# Patient Record
Sex: MALE | Race: WHITE | NOT HISPANIC OR LATINO | Employment: OTHER | URBAN - METROPOLITAN AREA
[De-identification: names, ages, dates, MRNs, and addresses within clinical notes are randomized per-mention and may not be internally consistent; named-entity substitution may affect disease eponyms.]

---

## 2017-07-10 ENCOUNTER — TRANSCRIBE ORDERS (OUTPATIENT)
Dept: URGENT CARE | Facility: CLINIC | Age: 72
End: 2017-07-10

## 2017-07-10 ENCOUNTER — APPOINTMENT (OUTPATIENT)
Dept: LAB | Facility: CLINIC | Age: 72
End: 2017-07-10
Payer: COMMERCIAL

## 2017-07-10 DIAGNOSIS — N42.9 UNSPECIFIED DISORDER OF PROSTATE: ICD-10-CM

## 2017-07-10 DIAGNOSIS — E11.8 TYPE 2 DIABETES MELLITUS WITH COMPLICATION, WITHOUT LONG-TERM CURRENT USE OF INSULIN (HCC): ICD-10-CM

## 2017-07-10 DIAGNOSIS — E78.5 OTHER AND UNSPECIFIED HYPERLIPIDEMIA: ICD-10-CM

## 2017-07-10 DIAGNOSIS — E11.8 TYPE 2 DIABETES MELLITUS WITH COMPLICATION, WITHOUT LONG-TERM CURRENT USE OF INSULIN (HCC): Primary | ICD-10-CM

## 2017-07-10 DIAGNOSIS — R53.83 FATIGUE, UNSPECIFIED TYPE: ICD-10-CM

## 2017-07-10 LAB
ALBUMIN SERPL BCP-MCNC: 3.7 G/DL (ref 3.5–5)
ALP SERPL-CCNC: 77 U/L (ref 46–116)
ALT SERPL W P-5'-P-CCNC: 24 U/L (ref 12–78)
ANION GAP SERPL CALCULATED.3IONS-SCNC: 8 MMOL/L (ref 4–13)
AST SERPL W P-5'-P-CCNC: 16 U/L (ref 5–45)
BILIRUB SERPL-MCNC: 0.65 MG/DL (ref 0.2–1)
BUN SERPL-MCNC: 11 MG/DL (ref 5–25)
CALCIUM SERPL-MCNC: 9 MG/DL (ref 8.3–10.1)
CHLORIDE SERPL-SCNC: 103 MMOL/L (ref 100–108)
CHOLEST SERPL-MCNC: 200 MG/DL (ref 50–200)
CO2 SERPL-SCNC: 26 MMOL/L (ref 21–32)
CREAT SERPL-MCNC: 0.95 MG/DL (ref 0.6–1.3)
ERYTHROCYTE [DISTWIDTH] IN BLOOD BY AUTOMATED COUNT: 14 % (ref 11.6–15.1)
GFR SERPL CREATININE-BSD FRML MDRD: >60 ML/MIN/1.73SQ M
GLUCOSE P FAST SERPL-MCNC: 185 MG/DL (ref 65–99)
HCT VFR BLD AUTO: 41.3 % (ref 36.5–49.3)
HDLC SERPL-MCNC: 41 MG/DL (ref 40–60)
HGB BLD-MCNC: 14.5 G/DL (ref 12–17)
LDLC SERPL CALC-MCNC: 131 MG/DL (ref 0–100)
MCH RBC QN AUTO: 31 PG (ref 26.8–34.3)
MCHC RBC AUTO-ENTMCNC: 35.1 G/DL (ref 31.4–37.4)
MCV RBC AUTO: 88 FL (ref 82–98)
PLATELET # BLD AUTO: 211 THOUSANDS/UL (ref 149–390)
PMV BLD AUTO: 9.9 FL (ref 8.9–12.7)
POTASSIUM SERPL-SCNC: 4.1 MMOL/L (ref 3.5–5.3)
PROT SERPL-MCNC: 7.3 G/DL (ref 6.4–8.2)
PSA SERPL-MCNC: 2.2 NG/ML (ref 0–4)
RBC # BLD AUTO: 4.68 MILLION/UL (ref 3.88–5.62)
SODIUM SERPL-SCNC: 137 MMOL/L (ref 136–145)
T4 FREE SERPL-MCNC: 0.91 NG/DL (ref 0.76–1.46)
TRIGL SERPL-MCNC: 141 MG/DL
TSH SERPL DL<=0.05 MIU/L-ACNC: 1.83 UIU/ML (ref 0.36–3.74)
WBC # BLD AUTO: 6.21 THOUSAND/UL (ref 4.31–10.16)

## 2017-07-10 PROCEDURE — 85027 COMPLETE CBC AUTOMATED: CPT

## 2017-07-10 PROCEDURE — 84443 ASSAY THYROID STIM HORMONE: CPT

## 2017-07-10 PROCEDURE — G0103 PSA SCREENING: HCPCS

## 2017-07-10 PROCEDURE — 80061 LIPID PANEL: CPT

## 2017-07-10 PROCEDURE — 80053 COMPREHEN METABOLIC PANEL: CPT

## 2017-07-10 PROCEDURE — 36415 COLL VENOUS BLD VENIPUNCTURE: CPT

## 2017-07-10 PROCEDURE — 84439 ASSAY OF FREE THYROXINE: CPT

## 2017-12-12 ENCOUNTER — APPOINTMENT (OUTPATIENT)
Dept: LAB | Facility: CLINIC | Age: 72
End: 2017-12-12
Payer: COMMERCIAL

## 2017-12-12 ENCOUNTER — TRANSCRIBE ORDERS (OUTPATIENT)
Dept: URGENT CARE | Facility: CLINIC | Age: 72
End: 2017-12-12

## 2017-12-12 DIAGNOSIS — E11.8 TYPE 2 DIABETES MELLITUS WITH COMPLICATION, UNSPECIFIED LONG TERM INSULIN USE STATUS: ICD-10-CM

## 2017-12-12 DIAGNOSIS — E11.8 TYPE 2 DIABETES MELLITUS WITH COMPLICATION, UNSPECIFIED LONG TERM INSULIN USE STATUS: Primary | ICD-10-CM

## 2017-12-12 DIAGNOSIS — B19.20 HEPATITIS C VIRUS INFECTION WITHOUT HEPATIC COMA, UNSPECIFIED CHRONICITY: ICD-10-CM

## 2017-12-12 DIAGNOSIS — I10 BENIGN HYPERTENSION: ICD-10-CM

## 2017-12-12 DIAGNOSIS — E55.9 VITAMIN D DEFICIENCY DISEASE: ICD-10-CM

## 2017-12-12 DIAGNOSIS — E78.5 HYPERLIPIDEMIA, UNSPECIFIED HYPERLIPIDEMIA TYPE: ICD-10-CM

## 2017-12-12 LAB
25(OH)D3 SERPL-MCNC: 14.2 NG/ML (ref 30–100)
ALBUMIN SERPL BCP-MCNC: 3.4 G/DL (ref 3.5–5)
ALP SERPL-CCNC: 84 U/L (ref 46–116)
ALT SERPL W P-5'-P-CCNC: 20 U/L (ref 12–78)
ANION GAP SERPL CALCULATED.3IONS-SCNC: 5 MMOL/L (ref 4–13)
AST SERPL W P-5'-P-CCNC: 8 U/L (ref 5–45)
BASOPHILS # BLD AUTO: 0.03 THOUSANDS/ΜL (ref 0–0.1)
BASOPHILS NFR BLD AUTO: 0 % (ref 0–1)
BILIRUB SERPL-MCNC: 0.39 MG/DL (ref 0.2–1)
BUN SERPL-MCNC: 15 MG/DL (ref 5–25)
CALCIUM SERPL-MCNC: 8.8 MG/DL (ref 8.3–10.1)
CHLORIDE SERPL-SCNC: 104 MMOL/L (ref 100–108)
CHOLEST SERPL-MCNC: 201 MG/DL (ref 50–200)
CO2 SERPL-SCNC: 28 MMOL/L (ref 21–32)
CREAT SERPL-MCNC: 0.98 MG/DL (ref 0.6–1.3)
CREAT UR-MCNC: 189 MG/DL
EOSINOPHIL # BLD AUTO: 0.31 THOUSAND/ΜL (ref 0–0.61)
EOSINOPHIL NFR BLD AUTO: 4 % (ref 0–6)
ERYTHROCYTE [DISTWIDTH] IN BLOOD BY AUTOMATED COUNT: 13.3 % (ref 11.6–15.1)
EST. AVERAGE GLUCOSE BLD GHB EST-MCNC: 177 MG/DL
GFR SERPL CREATININE-BSD FRML MDRD: 77 ML/MIN/1.73SQ M
GLUCOSE P FAST SERPL-MCNC: 181 MG/DL (ref 65–99)
HBA1C MFR BLD: 7.8 % (ref 4.2–6.3)
HCT VFR BLD AUTO: 42.4 % (ref 36.5–49.3)
HDLC SERPL-MCNC: 43 MG/DL (ref 40–60)
HGB BLD-MCNC: 14.8 G/DL (ref 12–17)
LDLC SERPL CALC-MCNC: 135 MG/DL (ref 0–100)
LYMPHOCYTES # BLD AUTO: 2.12 THOUSANDS/ΜL (ref 0.6–4.47)
LYMPHOCYTES NFR BLD AUTO: 29 % (ref 14–44)
MCH RBC QN AUTO: 31 PG (ref 26.8–34.3)
MCHC RBC AUTO-ENTMCNC: 34.9 G/DL (ref 31.4–37.4)
MCV RBC AUTO: 89 FL (ref 82–98)
MICROALBUMIN UR-MCNC: 50.4 MG/L (ref 0–20)
MICROALBUMIN/CREAT 24H UR: 27 MG/G CREATININE (ref 0–30)
MONOCYTES # BLD AUTO: 0.72 THOUSAND/ΜL (ref 0.17–1.22)
MONOCYTES NFR BLD AUTO: 10 % (ref 4–12)
NEUTROPHILS # BLD AUTO: 4.09 THOUSANDS/ΜL (ref 1.85–7.62)
NEUTS SEG NFR BLD AUTO: 57 % (ref 43–75)
NRBC BLD AUTO-RTO: 0 /100 WBCS
PLATELET # BLD AUTO: 216 THOUSANDS/UL (ref 149–390)
PMV BLD AUTO: 9.7 FL (ref 8.9–12.7)
POTASSIUM SERPL-SCNC: 4.2 MMOL/L (ref 3.5–5.3)
PROT SERPL-MCNC: 7.1 G/DL (ref 6.4–8.2)
RBC # BLD AUTO: 4.77 MILLION/UL (ref 3.88–5.62)
SODIUM SERPL-SCNC: 137 MMOL/L (ref 136–145)
TRIGL SERPL-MCNC: 115 MG/DL
WBC # BLD AUTO: 7.3 THOUSAND/UL (ref 4.31–10.16)

## 2017-12-12 PROCEDURE — 80053 COMPREHEN METABOLIC PANEL: CPT

## 2017-12-12 PROCEDURE — 36415 COLL VENOUS BLD VENIPUNCTURE: CPT

## 2017-12-12 PROCEDURE — 82570 ASSAY OF URINE CREATININE: CPT

## 2017-12-12 PROCEDURE — 82043 UR ALBUMIN QUANTITATIVE: CPT

## 2017-12-12 PROCEDURE — 80061 LIPID PANEL: CPT

## 2017-12-12 PROCEDURE — 83036 HEMOGLOBIN GLYCOSYLATED A1C: CPT

## 2017-12-12 PROCEDURE — 82306 VITAMIN D 25 HYDROXY: CPT

## 2017-12-12 PROCEDURE — 86803 HEPATITIS C AB TEST: CPT

## 2017-12-12 PROCEDURE — 85025 COMPLETE CBC W/AUTO DIFF WBC: CPT

## 2017-12-13 LAB — HCV AB SER QL: NORMAL

## 2018-08-08 ENCOUNTER — TRANSCRIBE ORDERS (OUTPATIENT)
Dept: URGENT CARE | Facility: CLINIC | Age: 73
End: 2018-08-08

## 2018-08-08 ENCOUNTER — APPOINTMENT (OUTPATIENT)
Dept: LAB | Facility: CLINIC | Age: 73
End: 2018-08-08
Payer: COMMERCIAL

## 2018-08-08 DIAGNOSIS — Z79.899 ENCOUNTER FOR LONG-TERM (CURRENT) USE OF MEDICATIONS: ICD-10-CM

## 2018-08-08 DIAGNOSIS — E11.8 TYPE 2 DIABETES MELLITUS WITH COMPLICATION, UNSPECIFIED WHETHER LONG TERM INSULIN USE: ICD-10-CM

## 2018-08-08 DIAGNOSIS — I10 ESSENTIAL HYPERTENSION, MALIGNANT: ICD-10-CM

## 2018-08-08 DIAGNOSIS — E78.5 HYPERLIPIDEMIA, UNSPECIFIED HYPERLIPIDEMIA TYPE: ICD-10-CM

## 2018-08-08 DIAGNOSIS — E11.8 TYPE 2 DIABETES MELLITUS WITH COMPLICATION, UNSPECIFIED WHETHER LONG TERM INSULIN USE: Primary | ICD-10-CM

## 2018-08-08 LAB
ALBUMIN SERPL BCP-MCNC: 3.8 G/DL (ref 3.5–5)
ALP SERPL-CCNC: 68 U/L (ref 46–116)
ALT SERPL W P-5'-P-CCNC: 24 U/L (ref 12–78)
ANION GAP SERPL CALCULATED.3IONS-SCNC: 5 MMOL/L (ref 4–13)
AST SERPL W P-5'-P-CCNC: 9 U/L (ref 5–45)
BILIRUB SERPL-MCNC: 0.59 MG/DL (ref 0.2–1)
BUN SERPL-MCNC: 12 MG/DL (ref 5–25)
CALCIUM SERPL-MCNC: 9 MG/DL (ref 8.3–10.1)
CHLORIDE SERPL-SCNC: 101 MMOL/L (ref 100–108)
CHOLEST SERPL-MCNC: 211 MG/DL (ref 50–200)
CO2 SERPL-SCNC: 29 MMOL/L (ref 21–32)
CREAT SERPL-MCNC: 0.94 MG/DL (ref 0.6–1.3)
CREAT UR-MCNC: 75.5 MG/DL
ERYTHROCYTE [DISTWIDTH] IN BLOOD BY AUTOMATED COUNT: 12.8 % (ref 11.6–15.1)
EST. AVERAGE GLUCOSE BLD GHB EST-MCNC: 194 MG/DL
GFR SERPL CREATININE-BSD FRML MDRD: 81 ML/MIN/1.73SQ M
GLUCOSE P FAST SERPL-MCNC: 209 MG/DL (ref 65–99)
HBA1C MFR BLD: 8.4 % (ref 4.2–6.3)
HCT VFR BLD AUTO: 43.8 % (ref 36.5–49.3)
HDLC SERPL-MCNC: 43 MG/DL (ref 40–60)
HGB BLD-MCNC: 14.5 G/DL (ref 12–17)
LDLC SERPL CALC-MCNC: 134 MG/DL (ref 0–100)
MCH RBC QN AUTO: 30.7 PG (ref 26.8–34.3)
MCHC RBC AUTO-ENTMCNC: 33.1 G/DL (ref 31.4–37.4)
MCV RBC AUTO: 93 FL (ref 82–98)
MICROALBUMIN UR-MCNC: 29.6 MG/L (ref 0–20)
MICROALBUMIN/CREAT 24H UR: 39 MG/G CREATININE (ref 0–30)
NONHDLC SERPL-MCNC: 168 MG/DL
PLATELET # BLD AUTO: 232 THOUSANDS/UL (ref 149–390)
PMV BLD AUTO: 10 FL (ref 8.9–12.7)
POTASSIUM SERPL-SCNC: 4.1 MMOL/L (ref 3.5–5.3)
PROT SERPL-MCNC: 7.1 G/DL (ref 6.4–8.2)
RBC # BLD AUTO: 4.72 MILLION/UL (ref 3.88–5.62)
SODIUM SERPL-SCNC: 135 MMOL/L (ref 136–145)
TRIGL SERPL-MCNC: 169 MG/DL
WBC # BLD AUTO: 6.52 THOUSAND/UL (ref 4.31–10.16)

## 2018-08-08 PROCEDURE — 82570 ASSAY OF URINE CREATININE: CPT

## 2018-08-08 PROCEDURE — 82043 UR ALBUMIN QUANTITATIVE: CPT

## 2018-08-08 PROCEDURE — 80053 COMPREHEN METABOLIC PANEL: CPT

## 2018-08-08 PROCEDURE — 83036 HEMOGLOBIN GLYCOSYLATED A1C: CPT

## 2018-08-08 PROCEDURE — 80061 LIPID PANEL: CPT

## 2018-08-08 PROCEDURE — 36415 COLL VENOUS BLD VENIPUNCTURE: CPT

## 2018-08-08 PROCEDURE — 85027 COMPLETE CBC AUTOMATED: CPT

## 2018-12-28 ENCOUNTER — APPOINTMENT (OUTPATIENT)
Dept: RADIOLOGY | Facility: CLINIC | Age: 73
End: 2018-12-28
Payer: COMMERCIAL

## 2018-12-28 ENCOUNTER — TRANSCRIBE ORDERS (OUTPATIENT)
Dept: URGENT CARE | Facility: CLINIC | Age: 73
End: 2018-12-28

## 2018-12-28 DIAGNOSIS — R05.9 COUGH: ICD-10-CM

## 2018-12-28 DIAGNOSIS — R05.9 COUGH: Primary | ICD-10-CM

## 2018-12-28 PROCEDURE — 71046 X-RAY EXAM CHEST 2 VIEWS: CPT

## 2019-03-22 ENCOUNTER — APPOINTMENT (EMERGENCY)
Dept: RADIOLOGY | Facility: HOSPITAL | Age: 74
End: 2019-03-22
Payer: COMMERCIAL

## 2019-03-22 ENCOUNTER — HOSPITAL ENCOUNTER (OUTPATIENT)
Facility: HOSPITAL | Age: 74
Setting detail: OBSERVATION
Discharge: HOME | End: 2019-03-23
Attending: EMERGENCY MEDICINE | Admitting: INTERNAL MEDICINE
Payer: COMMERCIAL

## 2019-03-22 DIAGNOSIS — R07.9 CHEST PAIN: Primary | ICD-10-CM

## 2019-03-22 DIAGNOSIS — I10 HYPERTENSION: ICD-10-CM

## 2019-03-22 PROBLEM — E11.8 TYPE 2 DIABETES MELLITUS WITH COMPLICATION, WITHOUT LONG-TERM CURRENT USE OF INSULIN (HCC): Chronic | Status: ACTIVE | Noted: 2017-10-23

## 2019-03-22 PROBLEM — E11.9 DIABETES MELLITUS (HCC): Status: ACTIVE | Noted: 2017-10-23

## 2019-03-22 PROBLEM — E11.8 TYPE 2 DIABETES MELLITUS WITH COMPLICATION, WITHOUT LONG-TERM CURRENT USE OF INSULIN (HCC): Status: ACTIVE | Noted: 2017-10-23

## 2019-03-22 PROBLEM — E78.5 HYPERLIPIDEMIA: Chronic | Status: ACTIVE | Noted: 2017-10-23

## 2019-03-22 PROBLEM — J45.909 ASTHMA: Chronic | Status: ACTIVE | Noted: 2017-10-23

## 2019-03-22 PROBLEM — J45.909 ASTHMA: Status: ACTIVE | Noted: 2017-10-23

## 2019-03-22 PROBLEM — E78.5 HYPERLIPIDEMIA: Status: ACTIVE | Noted: 2017-10-23

## 2019-03-22 PROBLEM — E11.9 DIABETES MELLITUS (HCC): Chronic | Status: ACTIVE | Noted: 2017-10-23

## 2019-03-22 LAB
ALBUMIN SERPL BCP-MCNC: 4.2 G/DL (ref 3.5–5.7)
ALP SERPL-CCNC: 60 U/L (ref 55–165)
ALT SERPL W P-5'-P-CCNC: 15 U/L (ref 7–52)
ANION GAP SERPL CALCULATED.3IONS-SCNC: 6 MMOL/L (ref 4–13)
APTT PPP: 34 SECONDS (ref 26–38)
AST SERPL W P-5'-P-CCNC: 15 U/L (ref 13–39)
BASOPHILS # BLD AUTO: 0 THOUSANDS/ΜL (ref 0–0.1)
BASOPHILS NFR BLD AUTO: 1 % (ref 0–2)
BILIRUB SERPL-MCNC: 0.3 MG/DL (ref 0.2–1)
BNP SERPL-MCNC: 42 PG/ML (ref 1–100)
BUN SERPL-MCNC: 15 MG/DL (ref 7–25)
CALCIUM SERPL-MCNC: 9.8 MG/DL (ref 8.6–10.5)
CHLORIDE SERPL-SCNC: 102 MMOL/L (ref 98–107)
CO2 SERPL-SCNC: 27 MMOL/L (ref 21–31)
CREAT SERPL-MCNC: 0.94 MG/DL (ref 0.7–1.3)
EOSINOPHIL # BLD AUTO: 0.2 THOUSAND/ΜL (ref 0–0.61)
EOSINOPHIL NFR BLD AUTO: 2 % (ref 0–5)
ERYTHROCYTE [DISTWIDTH] IN BLOOD BY AUTOMATED COUNT: 14.2 % (ref 11.5–14.5)
GFR SERPL CREATININE-BSD FRML MDRD: 80 ML/MIN/1.73SQ M
GLUCOSE SERPL-MCNC: 131 MG/DL (ref 65–140)
GLUCOSE SERPL-MCNC: 136 MG/DL (ref 65–99)
GLUCOSE SERPL-MCNC: 192 MG/DL (ref 65–140)
HCT VFR BLD AUTO: 42.2 % (ref 42–47)
HGB BLD-MCNC: 14.6 G/DL (ref 14–18)
INR PPP: 0.97 (ref 0.9–1.5)
LYMPHOCYTES # BLD AUTO: 1.5 THOUSANDS/ΜL (ref 0.6–4.47)
LYMPHOCYTES NFR BLD AUTO: 20 % (ref 21–51)
MCH RBC QN AUTO: 31.6 PG (ref 26–34)
MCHC RBC AUTO-ENTMCNC: 34.5 G/DL (ref 31–37)
MCV RBC AUTO: 92 FL (ref 81–99)
MONOCYTES # BLD AUTO: 0.6 THOUSAND/ΜL (ref 0.17–1.22)
MONOCYTES NFR BLD AUTO: 8 % (ref 2–12)
NEUTROPHILS # BLD AUTO: 5.3 THOUSANDS/ΜL (ref 1.4–6.5)
NEUTS SEG NFR BLD AUTO: 70 % (ref 42–75)
PLATELET # BLD AUTO: 240 THOUSANDS/UL (ref 149–390)
PMV BLD AUTO: 6.9 FL (ref 8.6–11.7)
POTASSIUM SERPL-SCNC: 4.2 MMOL/L (ref 3.5–5.5)
PROT SERPL-MCNC: 6.9 G/DL (ref 6.4–8.9)
PROTHROMBIN TIME: 11.3 SECONDS (ref 10.2–13)
RBC # BLD AUTO: 4.6 MILLION/UL (ref 4.3–5.9)
SODIUM SERPL-SCNC: 135 MMOL/L (ref 134–143)
TROPONIN I SERPL-MCNC: <0.03 NG/ML
TROPONIN I SERPL-MCNC: <0.03 NG/ML
WBC # BLD AUTO: 7.7 THOUSAND/UL (ref 4.8–10.8)

## 2019-03-22 PROCEDURE — 71045 X-RAY EXAM CHEST 1 VIEW: CPT

## 2019-03-22 PROCEDURE — 82948 REAGENT STRIP/BLOOD GLUCOSE: CPT

## 2019-03-22 PROCEDURE — 85025 COMPLETE CBC W/AUTO DIFF WBC: CPT | Performed by: EMERGENCY MEDICINE

## 2019-03-22 PROCEDURE — 84484 ASSAY OF TROPONIN QUANT: CPT | Performed by: EMERGENCY MEDICINE

## 2019-03-22 PROCEDURE — 36415 COLL VENOUS BLD VENIPUNCTURE: CPT | Performed by: EMERGENCY MEDICINE

## 2019-03-22 PROCEDURE — 84484 ASSAY OF TROPONIN QUANT: CPT | Performed by: PHYSICIAN ASSISTANT

## 2019-03-22 PROCEDURE — 93005 ELECTROCARDIOGRAM TRACING: CPT

## 2019-03-22 PROCEDURE — 99285 EMERGENCY DEPT VISIT HI MDM: CPT

## 2019-03-22 PROCEDURE — 85610 PROTHROMBIN TIME: CPT | Performed by: EMERGENCY MEDICINE

## 2019-03-22 PROCEDURE — 94760 N-INVAS EAR/PLS OXIMETRY 1: CPT

## 2019-03-22 PROCEDURE — 83880 ASSAY OF NATRIURETIC PEPTIDE: CPT | Performed by: EMERGENCY MEDICINE

## 2019-03-22 PROCEDURE — 83036 HEMOGLOBIN GLYCOSYLATED A1C: CPT | Performed by: PHYSICIAN ASSISTANT

## 2019-03-22 PROCEDURE — 99220 PR INITIAL OBSERVATION CARE/DAY 70 MINUTES: CPT | Performed by: PHYSICIAN ASSISTANT

## 2019-03-22 PROCEDURE — 80053 COMPREHEN METABOLIC PANEL: CPT | Performed by: EMERGENCY MEDICINE

## 2019-03-22 PROCEDURE — 85730 THROMBOPLASTIN TIME PARTIAL: CPT | Performed by: EMERGENCY MEDICINE

## 2019-03-22 RX ORDER — ACETAMINOPHEN 325 MG/1
650 TABLET ORAL EVERY 4 HOURS PRN
Status: DISCONTINUED | OUTPATIENT
Start: 2019-03-22 | End: 2019-03-23 | Stop reason: HOSPADM

## 2019-03-22 RX ORDER — ATENOLOL 25 MG/1
25 TABLET ORAL DAILY
Status: DISCONTINUED | OUTPATIENT
Start: 2019-03-23 | End: 2019-03-23 | Stop reason: HOSPADM

## 2019-03-22 RX ORDER — LORATADINE 10 MG/1
10 TABLET ORAL DAILY
Status: DISCONTINUED | OUTPATIENT
Start: 2019-03-23 | End: 2019-03-23 | Stop reason: HOSPADM

## 2019-03-22 RX ORDER — LISINOPRIL 20 MG/1
20 TABLET ORAL 2 TIMES DAILY
Status: DISCONTINUED | OUTPATIENT
Start: 2019-03-22 | End: 2019-03-23 | Stop reason: HOSPADM

## 2019-03-22 RX ORDER — ALBUTEROL SULFATE 90 UG/1
AEROSOL, METERED RESPIRATORY (INHALATION)
COMMUNITY

## 2019-03-22 RX ORDER — GLIPIZIDE 2.5 MG/1
2.5 TABLET, EXTENDED RELEASE ORAL 2 TIMES DAILY
COMMUNITY

## 2019-03-22 RX ORDER — AMLODIPINE BESYLATE 5 MG/1
5 TABLET ORAL
Status: DISCONTINUED | OUTPATIENT
Start: 2019-03-22 | End: 2019-03-23 | Stop reason: HOSPADM

## 2019-03-22 RX ORDER — LISINOPRIL 20 MG/1
20 TABLET ORAL 2 TIMES DAILY
COMMUNITY

## 2019-03-22 RX ORDER — ALBUTEROL SULFATE 90 UG/1
2 AEROSOL, METERED RESPIRATORY (INHALATION) EVERY 4 HOURS PRN
Status: DISCONTINUED | OUTPATIENT
Start: 2019-03-22 | End: 2019-03-23 | Stop reason: HOSPADM

## 2019-03-22 RX ORDER — FAMOTIDINE 20 MG/1
20 TABLET, FILM COATED ORAL 2 TIMES DAILY
Status: DISCONTINUED | OUTPATIENT
Start: 2019-03-22 | End: 2019-03-23 | Stop reason: HOSPADM

## 2019-03-22 RX ORDER — RANITIDINE 300 MG/1
TABLET ORAL
COMMUNITY
End: 2021-03-26 | Stop reason: HOSPADM

## 2019-03-22 RX ORDER — ASPIRIN 81 MG/1
81 TABLET, CHEWABLE ORAL DAILY
Status: DISCONTINUED | OUTPATIENT
Start: 2019-03-23 | End: 2019-03-23 | Stop reason: HOSPADM

## 2019-03-22 RX ORDER — ASPIRIN 325 MG
325 TABLET ORAL ONCE
Status: COMPLETED | OUTPATIENT
Start: 2019-03-22 | End: 2019-03-22

## 2019-03-22 RX ORDER — ATENOLOL 25 MG/1
25 TABLET ORAL DAILY
COMMUNITY

## 2019-03-22 RX ORDER — AMLODIPINE BESYLATE 5 MG/1
5 TABLET ORAL 2 TIMES DAILY
COMMUNITY

## 2019-03-22 RX ORDER — MONTELUKAST SODIUM 10 MG/1
10 TABLET ORAL
Status: DISCONTINUED | OUTPATIENT
Start: 2019-03-22 | End: 2019-03-23 | Stop reason: HOSPADM

## 2019-03-22 RX ORDER — NITROGLYCERIN 0.4 MG/1
0.4 TABLET SUBLINGUAL ONCE
Status: COMPLETED | OUTPATIENT
Start: 2019-03-22 | End: 2019-03-22

## 2019-03-22 RX ORDER — HYDROXYZINE HYDROCHLORIDE 25 MG/1
25 TABLET, FILM COATED ORAL EVERY 12 HOURS
Status: DISCONTINUED | OUTPATIENT
Start: 2019-03-22 | End: 2019-03-23 | Stop reason: HOSPADM

## 2019-03-22 RX ORDER — PREDNISONE 10 MG/1
5 TABLET ORAL DAILY
COMMUNITY
End: 2019-03-23 | Stop reason: HOSPADM

## 2019-03-22 RX ORDER — ONDANSETRON 2 MG/ML
4 INJECTION INTRAMUSCULAR; INTRAVENOUS EVERY 6 HOURS PRN
Status: DISCONTINUED | OUTPATIENT
Start: 2019-03-22 | End: 2019-03-23 | Stop reason: HOSPADM

## 2019-03-22 RX ORDER — NITROGLYCERIN 0.4 MG/1
0.4 TABLET SUBLINGUAL
Status: DISCONTINUED | OUTPATIENT
Start: 2019-03-22 | End: 2019-03-23 | Stop reason: SDUPTHER

## 2019-03-22 RX ORDER — MONTELUKAST SODIUM 10 MG/1
10 TABLET ORAL DAILY
COMMUNITY

## 2019-03-22 RX ORDER — HYDROXYZINE HYDROCHLORIDE 25 MG/1
TABLET, FILM COATED ORAL
COMMUNITY
End: 2022-06-22

## 2019-03-22 RX ADMIN — ASPIRIN 325 MG: 325 TABLET, FILM COATED ORAL at 17:41

## 2019-03-22 RX ADMIN — HYDROXYZINE HYDROCHLORIDE 25 MG: 25 TABLET ORAL at 20:35

## 2019-03-22 RX ADMIN — NITROGLYCERIN 1 INCH: 20 OINTMENT TOPICAL at 17:43

## 2019-03-22 RX ADMIN — FAMOTIDINE 20 MG: 20 TABLET ORAL at 22:06

## 2019-03-22 RX ADMIN — NITROGLYCERIN 0.4 MG: 0.4 TABLET SUBLINGUAL at 17:42

## 2019-03-22 RX ADMIN — LISINOPRIL 20 MG: 20 TABLET ORAL at 20:35

## 2019-03-22 RX ADMIN — INSULIN LISPRO 1 UNITS: 100 INJECTION, SOLUTION INTRAVENOUS; SUBCUTANEOUS at 22:06

## 2019-03-22 RX ADMIN — MONTELUKAST SODIUM 10 MG: 10 TABLET, FILM COATED ORAL at 22:05

## 2019-03-22 RX ADMIN — AMLODIPINE BESYLATE 5 MG: 5 TABLET ORAL at 20:35

## 2019-03-22 RX ADMIN — INSULIN DETEMIR 10 UNITS: 100 INJECTION, SOLUTION SUBCUTANEOUS at 22:07

## 2019-03-23 VITALS
WEIGHT: 190 LBS | HEIGHT: 72 IN | RESPIRATION RATE: 18 BRPM | OXYGEN SATURATION: 97 % | HEART RATE: 68 BPM | TEMPERATURE: 98.5 F | SYSTOLIC BLOOD PRESSURE: 111 MMHG | DIASTOLIC BLOOD PRESSURE: 75 MMHG | BODY MASS INDEX: 25.73 KG/M2

## 2019-03-23 DIAGNOSIS — R07.9 CHEST PAIN: Primary | ICD-10-CM

## 2019-03-23 LAB
ANION GAP SERPL CALCULATED.3IONS-SCNC: 7 MMOL/L (ref 4–13)
ATRIAL RATE: 92 BPM
BUN SERPL-MCNC: 14 MG/DL (ref 7–25)
CALCIUM SERPL-MCNC: 9.3 MG/DL (ref 8.6–10.5)
CHLORIDE SERPL-SCNC: 104 MMOL/L (ref 98–107)
CHOLEST SERPL-MCNC: 165 MG/DL (ref 0–200)
CO2 SERPL-SCNC: 26 MMOL/L (ref 21–31)
CREAT SERPL-MCNC: 0.89 MG/DL (ref 0.7–1.3)
EST. AVERAGE GLUCOSE BLD GHB EST-MCNC: 148 MG/DL
GFR SERPL CREATININE-BSD FRML MDRD: 85 ML/MIN/1.73SQ M
GLUCOSE SERPL-MCNC: 150 MG/DL (ref 65–140)
GLUCOSE SERPL-MCNC: 83 MG/DL (ref 65–140)
GLUCOSE SERPL-MCNC: 93 MG/DL (ref 65–99)
HBA1C MFR BLD: 6.8 % (ref 4.2–6.3)
HDLC SERPL-MCNC: 41 MG/DL (ref 40–60)
LDLC SERPL CALC-MCNC: 107 MG/DL (ref 75–193)
MAGNESIUM SERPL-MCNC: 2.2 MG/DL (ref 1.9–2.7)
NONHDLC SERPL-MCNC: 124 MG/DL
P AXIS: 65 DEGREES
POTASSIUM SERPL-SCNC: 3.7 MMOL/L (ref 3.5–5.5)
PR INTERVAL: 144 MS
QRS AXIS: 69 DEGREES
QRSD INTERVAL: 94 MS
QT INTERVAL: 378 MS
QTC INTERVAL: 467 MS
SODIUM SERPL-SCNC: 137 MMOL/L (ref 134–143)
T WAVE AXIS: 57 DEGREES
TRIGL SERPL-MCNC: 87 MG/DL (ref 44–166)
TROPONIN I SERPL-MCNC: <0.03 NG/ML
VENTRICULAR RATE: 92 BPM

## 2019-03-23 PROCEDURE — 83735 ASSAY OF MAGNESIUM: CPT | Performed by: PHYSICIAN ASSISTANT

## 2019-03-23 PROCEDURE — 80061 LIPID PANEL: CPT | Performed by: PHYSICIAN ASSISTANT

## 2019-03-23 PROCEDURE — 99217 PR OBSERVATION CARE DISCHARGE MANAGEMENT: CPT | Performed by: NURSE PRACTITIONER

## 2019-03-23 PROCEDURE — 93010 ELECTROCARDIOGRAM REPORT: CPT | Performed by: INTERNAL MEDICINE

## 2019-03-23 PROCEDURE — 99204 OFFICE O/P NEW MOD 45 MIN: CPT | Performed by: INTERNAL MEDICINE

## 2019-03-23 PROCEDURE — 80048 BASIC METABOLIC PNL TOTAL CA: CPT | Performed by: PHYSICIAN ASSISTANT

## 2019-03-23 PROCEDURE — 82948 REAGENT STRIP/BLOOD GLUCOSE: CPT

## 2019-03-23 RX ORDER — ASPIRIN 81 MG/1
81 TABLET, CHEWABLE ORAL DAILY
Qty: 30 TABLET | Refills: 0 | Status: SHIPPED | OUTPATIENT
Start: 2019-03-24 | End: 2022-06-22

## 2019-03-23 RX ORDER — NITROGLYCERIN 0.4 MG/1
0.4 TABLET SUBLINGUAL
Status: DISCONTINUED | OUTPATIENT
Start: 2019-03-23 | End: 2019-03-23 | Stop reason: HOSPADM

## 2019-03-23 RX ORDER — NITROGLYCERIN 0.4 MG/1
0.4 TABLET SUBLINGUAL
Qty: 30 TABLET | Refills: 0 | Status: SHIPPED | OUTPATIENT
Start: 2019-03-23 | End: 2022-06-22 | Stop reason: ALTCHOICE

## 2019-03-23 RX ADMIN — ASPIRIN 81 MG 81 MG: 81 TABLET ORAL at 09:57

## 2019-03-23 RX ADMIN — LISINOPRIL 20 MG: 20 TABLET ORAL at 09:56

## 2019-03-23 RX ADMIN — HYDROXYZINE HYDROCHLORIDE 25 MG: 25 TABLET ORAL at 09:57

## 2019-03-23 RX ADMIN — ENOXAPARIN SODIUM 40 MG: 40 INJECTION SUBCUTANEOUS at 09:56

## 2019-03-23 RX ADMIN — ATENOLOL 25 MG: 25 TABLET ORAL at 09:56

## 2019-03-23 RX ADMIN — LORATADINE 10 MG: 10 TABLET ORAL at 09:57

## 2019-03-26 ENCOUNTER — HOSPITAL ENCOUNTER (OUTPATIENT)
Dept: NON INVASIVE DIAGNOSTICS | Facility: CLINIC | Age: 74
Discharge: HOME | End: 2019-03-26
Payer: COMMERCIAL

## 2019-03-26 DIAGNOSIS — R07.9 CHEST PAIN: ICD-10-CM

## 2019-03-26 PROCEDURE — 93016 CV STRESS TEST SUPVJ ONLY: CPT | Performed by: INTERNAL MEDICINE

## 2019-03-26 PROCEDURE — A9502 TC99M TETROFOSMIN: HCPCS

## 2019-03-26 PROCEDURE — 78452 HT MUSCLE IMAGE SPECT MULT: CPT | Performed by: INTERNAL MEDICINE

## 2019-03-26 PROCEDURE — 93017 CV STRESS TEST TRACING ONLY: CPT

## 2019-03-26 PROCEDURE — 93018 CV STRESS TEST I&R ONLY: CPT | Performed by: INTERNAL MEDICINE

## 2019-03-26 PROCEDURE — 78452 HT MUSCLE IMAGE SPECT MULT: CPT

## 2019-03-27 ENCOUNTER — TELEPHONE (OUTPATIENT)
Dept: CARDIOLOGY CLINIC | Facility: CLINIC | Age: 74
End: 2019-03-27

## 2019-03-27 NOTE — TELEPHONE ENCOUNTER
----- Message from Jody Moya MD sent at 3/27/2019 12:09 PM EDT -----  Please let him know that there is no evidence of a new blockage on his recent stress test  Will discuss further at his next appt       Thanks

## 2019-03-29 LAB
ARRHY DURING EX: NORMAL
CHEST PAIN STATEMENT: NORMAL
MAX DIASTOLIC BP: 88 MMHG
MAX HEART RATE: 141 BPM
MAX PREDICTED HEART RATE: 147 BPM
MAX. SYSTOLIC BP: 182 MMHG
PROTOCOL NAME: NORMAL
REASON FOR TERMINATION: NORMAL
TARGET HR FORMULA: NORMAL
TEST INDICATION: NORMAL
TIME IN EXERCISE PHASE: NORMAL

## 2019-07-29 ENCOUNTER — APPOINTMENT (OUTPATIENT)
Dept: LAB | Facility: CLINIC | Age: 74
End: 2019-07-29
Payer: COMMERCIAL

## 2019-07-29 ENCOUNTER — TRANSCRIBE ORDERS (OUTPATIENT)
Dept: URGENT CARE | Facility: CLINIC | Age: 74
End: 2019-07-29

## 2019-07-29 DIAGNOSIS — E78.5 HYPERLIPIDEMIA, UNSPECIFIED HYPERLIPIDEMIA TYPE: ICD-10-CM

## 2019-07-29 DIAGNOSIS — I10 ESSENTIAL HYPERTENSION: ICD-10-CM

## 2019-07-29 DIAGNOSIS — R63.5 WEIGHT GAIN: ICD-10-CM

## 2019-07-29 DIAGNOSIS — Z79.4 TYPE 2 DIABETES MELLITUS WITH COMPLICATION, WITH LONG-TERM CURRENT USE OF INSULIN (HCC): ICD-10-CM

## 2019-07-29 DIAGNOSIS — Z79.899 ENCOUNTER FOR LONG-TERM (CURRENT) USE OF OTHER MEDICATIONS: Primary | ICD-10-CM

## 2019-07-29 DIAGNOSIS — E11.8 TYPE 2 DIABETES MELLITUS WITH COMPLICATION, WITH LONG-TERM CURRENT USE OF INSULIN (HCC): ICD-10-CM

## 2019-07-29 LAB
ALBUMIN SERPL BCP-MCNC: 3.6 G/DL (ref 3.5–5)
ALP SERPL-CCNC: 78 U/L (ref 46–116)
ALT SERPL W P-5'-P-CCNC: 24 U/L (ref 12–78)
ANION GAP SERPL CALCULATED.3IONS-SCNC: 6 MMOL/L (ref 4–13)
AST SERPL W P-5'-P-CCNC: 15 U/L (ref 5–45)
BILIRUB SERPL-MCNC: 0.32 MG/DL (ref 0.2–1)
BUN SERPL-MCNC: 15 MG/DL (ref 5–25)
CALCIUM SERPL-MCNC: 8.8 MG/DL (ref 8.3–10.1)
CHLORIDE SERPL-SCNC: 106 MMOL/L (ref 100–108)
CHOLEST SERPL-MCNC: 186 MG/DL (ref 50–200)
CO2 SERPL-SCNC: 28 MMOL/L (ref 21–32)
CREAT SERPL-MCNC: 0.97 MG/DL (ref 0.6–1.3)
CREAT UR-MCNC: 96.5 MG/DL
ERYTHROCYTE [DISTWIDTH] IN BLOOD BY AUTOMATED COUNT: 12.9 % (ref 11.6–15.1)
EST. AVERAGE GLUCOSE BLD GHB EST-MCNC: 140 MG/DL
GFR SERPL CREATININE-BSD FRML MDRD: 77 ML/MIN/1.73SQ M
GLUCOSE P FAST SERPL-MCNC: 107 MG/DL (ref 65–99)
HBA1C MFR BLD: 6.5 % (ref 4.2–6.3)
HCT VFR BLD AUTO: 43.6 % (ref 36.5–49.3)
HDLC SERPL-MCNC: 44 MG/DL (ref 40–60)
HGB BLD-MCNC: 14.5 G/DL (ref 12–17)
LDLC SERPL CALC-MCNC: 125 MG/DL (ref 0–100)
MCH RBC QN AUTO: 31.1 PG (ref 26.8–34.3)
MCHC RBC AUTO-ENTMCNC: 33.3 G/DL (ref 31.4–37.4)
MCV RBC AUTO: 94 FL (ref 82–98)
MICROALBUMIN UR-MCNC: 38.3 MG/L (ref 0–20)
MICROALBUMIN/CREAT 24H UR: 40 MG/G CREATININE (ref 0–30)
NONHDLC SERPL-MCNC: 142 MG/DL
PLATELET # BLD AUTO: 239 THOUSANDS/UL (ref 149–390)
PMV BLD AUTO: 10.1 FL (ref 8.9–12.7)
POTASSIUM SERPL-SCNC: 4.1 MMOL/L (ref 3.5–5.3)
PROT SERPL-MCNC: 7 G/DL (ref 6.4–8.2)
RBC # BLD AUTO: 4.66 MILLION/UL (ref 3.88–5.62)
SODIUM SERPL-SCNC: 140 MMOL/L (ref 136–145)
T4 FREE SERPL-MCNC: 0.81 NG/DL (ref 0.76–1.46)
TRIGL SERPL-MCNC: 85 MG/DL
TSH SERPL DL<=0.05 MIU/L-ACNC: 2.61 UIU/ML (ref 0.36–3.74)
WBC # BLD AUTO: 7.37 THOUSAND/UL (ref 4.31–10.16)

## 2019-07-29 PROCEDURE — 80053 COMPREHEN METABOLIC PANEL: CPT

## 2019-07-29 PROCEDURE — 85027 COMPLETE CBC AUTOMATED: CPT

## 2019-07-29 PROCEDURE — 84443 ASSAY THYROID STIM HORMONE: CPT

## 2019-07-29 PROCEDURE — 82570 ASSAY OF URINE CREATININE: CPT

## 2019-07-29 PROCEDURE — 84439 ASSAY OF FREE THYROXINE: CPT

## 2019-07-29 PROCEDURE — 83036 HEMOGLOBIN GLYCOSYLATED A1C: CPT

## 2019-07-29 PROCEDURE — 36415 COLL VENOUS BLD VENIPUNCTURE: CPT

## 2019-07-29 PROCEDURE — 80061 LIPID PANEL: CPT

## 2019-07-29 PROCEDURE — 82043 UR ALBUMIN QUANTITATIVE: CPT

## 2020-01-06 ENCOUNTER — APPOINTMENT (OUTPATIENT)
Dept: RADIOLOGY | Facility: CLINIC | Age: 75
End: 2020-01-06
Payer: COMMERCIAL

## 2020-01-06 ENCOUNTER — OFFICE VISIT (OUTPATIENT)
Dept: URGENT CARE | Facility: CLINIC | Age: 75
End: 2020-01-06
Payer: COMMERCIAL

## 2020-01-06 VITALS
OXYGEN SATURATION: 94 % | WEIGHT: 194.4 LBS | HEART RATE: 92 BPM | BODY MASS INDEX: 26.33 KG/M2 | DIASTOLIC BLOOD PRESSURE: 79 MMHG | HEIGHT: 72 IN | TEMPERATURE: 99.5 F | SYSTOLIC BLOOD PRESSURE: 171 MMHG | RESPIRATION RATE: 22 BRPM

## 2020-01-06 DIAGNOSIS — B96.89 ACUTE BACTERIAL BRONCHITIS: Primary | ICD-10-CM

## 2020-01-06 DIAGNOSIS — R06.02 SHORTNESS OF BREATH: ICD-10-CM

## 2020-01-06 DIAGNOSIS — J20.8 ACUTE BACTERIAL BRONCHITIS: Primary | ICD-10-CM

## 2020-01-06 PROCEDURE — 99213 OFFICE O/P EST LOW 20 MIN: CPT | Performed by: PHYSICIAN ASSISTANT

## 2020-01-06 PROCEDURE — 71046 X-RAY EXAM CHEST 2 VIEWS: CPT

## 2020-01-06 PROCEDURE — S9088 SERVICES PROVIDED IN URGENT: HCPCS | Performed by: PHYSICIAN ASSISTANT

## 2020-01-06 RX ORDER — ALBUTEROL SULFATE 2.5 MG/3ML
2.5 SOLUTION RESPIRATORY (INHALATION) ONCE
Status: COMPLETED | OUTPATIENT
Start: 2020-01-06 | End: 2020-01-06

## 2020-01-06 RX ORDER — AZITHROMYCIN 250 MG/1
250 TABLET, FILM COATED ORAL DAILY
Qty: 6 TABLET | Refills: 0 | Status: SHIPPED | OUTPATIENT
Start: 2020-01-06 | End: 2020-01-11

## 2020-01-06 RX ORDER — IPRATROPIUM BROMIDE AND ALBUTEROL SULFATE 2.5; .5 MG/3ML; MG/3ML
3 SOLUTION RESPIRATORY (INHALATION) ONCE
Status: COMPLETED | OUTPATIENT
Start: 2020-01-06 | End: 2020-01-06

## 2020-01-06 RX ORDER — CEFTRIAXONE 1 G/1
1000 INJECTION, POWDER, FOR SOLUTION INTRAMUSCULAR; INTRAVENOUS ONCE
Status: COMPLETED | OUTPATIENT
Start: 2020-01-06 | End: 2020-01-06

## 2020-01-06 RX ADMIN — CEFTRIAXONE 1000 MG: 1 INJECTION, POWDER, FOR SOLUTION INTRAMUSCULAR; INTRAVENOUS at 15:15

## 2020-01-06 RX ADMIN — ALBUTEROL SULFATE 2.5 MG: 2.5 SOLUTION RESPIRATORY (INHALATION) at 15:38

## 2020-01-06 RX ADMIN — IPRATROPIUM BROMIDE AND ALBUTEROL SULFATE 3 ML: 2.5; .5 SOLUTION RESPIRATORY (INHALATION) at 14:47

## 2020-01-06 NOTE — PATIENT INSTRUCTIONS

## 2020-01-06 NOTE — PROGRESS NOTES
[facility]        NAME: Kristen Ragsdale is a 76 y o  male  : 1945    MRN: [id number]  DATE: 2020  TIME: 4:03 PM    Assessment and Plan   Acute bacterial bronchitis [J20 8, B96 89]  1  Acute bacterial bronchitis  cefTRIAXone (ROCEPHIN) injection 1,000 mg    azithromycin (ZITHROMAX) 250 mg tablet   2  Shortness of breath  ipratropium-albuterol (DUO-NEB) 0 5-2 5 mg/3 mL inhalation solution 3 mL    XR chest pa & lateral    albuterol inhalation solution 2 5 mg         Patient Instructions       Follow up with PCP in 3-5 days  Proceed to  ER if symptoms worsen  Chief Complaint     Chief Complaint   Patient presents with    Cold Like Symptoms     c/o cough, low grade fever, chills, SOB since last Wednesday  History of Present Illness       Patient presents with a 5 day history of cough congestion low-grade fevers and shortness of breath  Patient has a history of asthma is having minimal relief with the rescue inhaler  Patient was treated by his PCP with an IM injection of antibiotic and Zithromax which improved his symptoms now have returned  Patient does not try to do a steroid secondary to elevated blood sugars  Review of Systems   Review of Systems   Constitutional: Positive for activity change, chills and fever  HENT: Positive for congestion  Negative for ear pain, postnasal drip, rhinorrhea and sore throat  Respiratory: Positive for cough and shortness of breath  Negative for chest tightness and wheezing  Cardiovascular: Negative for chest pain  Gastrointestinal: Negative for nausea and vomiting  Musculoskeletal: Negative for myalgias  Skin: Negative for rash  Neurological: Negative for headaches  Hematological: Negative for adenopathy           Current Medications       Current Outpatient Medications:     albuterol (VENTOLIN HFA) 90 mcg/act inhaler, Ventolin HFA 90 mcg/actuation aerosol inhaler, Disp: , Rfl:     amLODIPine (NORVASC) 5 mg tablet, amlodipine 5 mg tablet, Disp: , Rfl:     atenolol (TENORMIN) 25 mg tablet, atenolol 25 mg tablet, Disp: , Rfl:     glipiZIDE (GLUCOTROL XL) 2 5 mg 24 hr tablet, glipizide ER 2 5 mg tablet, extended release 24 hr, Disp: , Rfl:     hydrOXYzine HCL (ATARAX) 25 mg tablet, hydroxyzine HCl 25 mg tablet, Disp: , Rfl:     lisinopril (ZESTRIL) 20 mg tablet, lisinopril 20 mg tablet, Disp: , Rfl:     metFORMIN (GLUCOPHAGE) 500 mg tablet, metformin 500 mg tablet, Disp: , Rfl:     montelukast (SINGULAIR) 10 mg tablet, montelukast 10 mg tablet, Disp: , Rfl:     ranitidine (ZANTAC) 300 MG tablet, ranitidine 300 mg tablet, Disp: , Rfl:     aspirin 81 mg chewable tablet, Chew 1 tablet (81 mg total) daily for 30 days, Disp: 30 tablet, Rfl: 0    azithromycin (ZITHROMAX) 250 mg tablet, Take 1 tablet (250 mg total) by mouth daily for 5 days 2 pills today, then one daily for 4 more days, Disp: 6 tablet, Rfl: 0    nitroglycerin (NITROSTAT) 0 4 mg SL tablet, Place 1 tablet (0 4 mg total) under the tongue every 5 (five) minutes as needed for chest pain for up to 30 days, Disp: 30 tablet, Rfl: 0  No current facility-administered medications for this visit       Current Allergies     Allergies as of 01/06/2020    (No Known Allergies)            The following portions of the patient's history were reviewed and updated as appropriate: allergies, current medications, past family history, past medical history, past social history, past surgical history and problem list      Past Medical History:   Diagnosis Date    Asthma     Diabetes mellitus ([address] )     Environmental allergies     Hyperlipidemia     Hypertension     Orthostatic hypotension     Osteopenia     Sinusitis        Past Surgical History:   Procedure Laterality Date    COLONOSCOPY      KNEE ARTHROPLASTY      VASECTOMY         Family History   Problem Relation Age of Onset    Asthma Mother     Cancer Father     Asthma Brother     Cancer Brother Medications have been verified  Objective   BP (!) 171/79 (BP Location: Right arm, Patient Position: Sitting, Cuff Size: Extra-Large)   Pulse 92   Temp 99 5 °F (37 5 °C) (Tympanic)   Resp 22   Ht 6' (1 829 m)   Wt 88 2 kg (194 lb 6 4 oz)   SpO2 94%   BMI 26 37 kg/m²          Physical Exam     Physical Exam   Constitutional: He is oriented to person, place, and time  He appears well-developed and well-nourished  HENT:   Head: Normocephalic and atraumatic  Right Ear: External ear normal    Left Ear: External ear normal    Nose: Nose normal    Mouth/Throat: Oropharynx is clear and moist    Neck: Neck supple  Cardiovascular: Normal rate, regular rhythm and normal heart sounds  Pulmonary/Chest: Effort normal    Diminished breath sounds in all lung fields  Lymphadenopathy:     He has no cervical adenopathy  Neurological: He is alert and oriented to person, place, and time  Skin: Skin is warm and dry  Psychiatric: He has a normal mood and affect  His behavior is normal    Nursing note and vitals reviewed  Mini neb  Performed by: Eveline Castleman, PA-C  Authorized by: Eveline Castleman, PA-C     Number of treatments:  2  Treatment 1:   Pre-Procedure     Symptoms:  Shortness of breath    Lung Sounds:  Diminished breath sounds in all fields    HR:  92    RR:  22    SP02:  94    Medication Administered:  Duoneb - Albuterol 2 5 mg/Atrovent 0 5 mg  Post-Procedure     Symptoms:  Shortness of breath    Lung sounds:  Improved breath sounds in all lung fields    HR:  99    RR:  20    SP02:  93  Treatment 2:   Pre-Procedure     Symptoms:  Shortness of breath    Lung sounds:  Clear    HR:  98    RR:  20    SP02:  93    Medication Administered:  Albuterol 2 5 mg  Post-procedure     Symptoms:  Shortness of breath    Lung sounds:  Clear    HR:  101    RR:  20    SP02:  97    Chest x-ray viewed by me and independently reviewed by me contemporaneously as no acute process

## 2020-06-16 ENCOUNTER — APPOINTMENT (OUTPATIENT)
Dept: LAB | Facility: CLINIC | Age: 75
End: 2020-06-16
Payer: COMMERCIAL

## 2020-06-16 ENCOUNTER — TRANSCRIBE ORDERS (OUTPATIENT)
Dept: URGENT CARE | Facility: CLINIC | Age: 75
End: 2020-06-16

## 2020-06-16 DIAGNOSIS — J06.9 UPPER RESPIRATORY TRACT INFECTION, UNSPECIFIED TYPE: ICD-10-CM

## 2020-06-16 DIAGNOSIS — J06.9 UPPER RESPIRATORY TRACT INFECTION, UNSPECIFIED TYPE: Primary | ICD-10-CM

## 2020-06-16 PROCEDURE — 36415 COLL VENOUS BLD VENIPUNCTURE: CPT

## 2020-06-16 PROCEDURE — 86769 SARS-COV-2 COVID-19 ANTIBODY: CPT

## 2020-06-17 LAB
SARS-COV-2 IGG SERPL QL IA: NEGATIVE
SARS-COV-2 IGM SERPL QL IA: NEGATIVE

## 2020-07-07 ENCOUNTER — TRANSCRIBE ORDERS (OUTPATIENT)
Dept: LAB | Facility: CLINIC | Age: 75
End: 2020-07-07

## 2020-07-07 ENCOUNTER — APPOINTMENT (OUTPATIENT)
Dept: LAB | Facility: CLINIC | Age: 75
End: 2020-07-07
Payer: COMMERCIAL

## 2020-07-07 DIAGNOSIS — E55.9 AVITAMINOSIS D: ICD-10-CM

## 2020-07-07 DIAGNOSIS — I51.9 MYXEDEMA HEART DISEASE: ICD-10-CM

## 2020-07-07 DIAGNOSIS — Z79.899 ENCOUNTER FOR LONG-TERM (CURRENT) USE OF OTHER MEDICATIONS: ICD-10-CM

## 2020-07-07 DIAGNOSIS — I10 ESSENTIAL HYPERTENSION, MALIGNANT: ICD-10-CM

## 2020-07-07 DIAGNOSIS — E11.9 TYPE 2 DIABETES MELLITUS WITHOUT COMPLICATION, UNSPECIFIED WHETHER LONG TERM INSULIN USE (HCC): ICD-10-CM

## 2020-07-07 DIAGNOSIS — I10 ESSENTIAL HYPERTENSION, MALIGNANT: Primary | ICD-10-CM

## 2020-07-07 DIAGNOSIS — E03.9 MYXEDEMA HEART DISEASE: ICD-10-CM

## 2020-07-07 LAB
25(OH)D3 SERPL-MCNC: 43.9 NG/ML (ref 30–100)
ALBUMIN SERPL BCP-MCNC: 3.9 G/DL (ref 3.5–5)
ALP SERPL-CCNC: 69 U/L (ref 46–116)
ALT SERPL W P-5'-P-CCNC: 23 U/L (ref 12–78)
ANION GAP SERPL CALCULATED.3IONS-SCNC: 5 MMOL/L (ref 4–13)
AST SERPL W P-5'-P-CCNC: 14 U/L (ref 5–45)
BILIRUB SERPL-MCNC: 0.46 MG/DL (ref 0.2–1)
BUN SERPL-MCNC: 13 MG/DL (ref 5–25)
CALCIUM SERPL-MCNC: 8.9 MG/DL (ref 8.3–10.1)
CHLORIDE SERPL-SCNC: 105 MMOL/L (ref 100–108)
CHOLEST SERPL-MCNC: 198 MG/DL (ref 50–200)
CO2 SERPL-SCNC: 28 MMOL/L (ref 21–32)
CREAT SERPL-MCNC: 0.93 MG/DL (ref 0.6–1.3)
CREAT UR-MCNC: 129 MG/DL
ERYTHROCYTE [DISTWIDTH] IN BLOOD BY AUTOMATED COUNT: 12.7 % (ref 11.6–15.1)
EST. AVERAGE GLUCOSE BLD GHB EST-MCNC: 131 MG/DL
GFR SERPL CREATININE-BSD FRML MDRD: 81 ML/MIN/1.73SQ M
GLUCOSE P FAST SERPL-MCNC: 112 MG/DL (ref 65–99)
HBA1C MFR BLD: 6.2 %
HCT VFR BLD AUTO: 45 % (ref 36.5–49.3)
HDLC SERPL-MCNC: 45 MG/DL
HGB BLD-MCNC: 15.1 G/DL (ref 12–17)
LDLC SERPL CALC-MCNC: 135 MG/DL (ref 0–100)
MCH RBC QN AUTO: 32.3 PG (ref 26.8–34.3)
MCHC RBC AUTO-ENTMCNC: 33.6 G/DL (ref 31.4–37.4)
MCV RBC AUTO: 96 FL (ref 82–98)
MICROALBUMIN UR-MCNC: 38.8 MG/L (ref 0–20)
MICROALBUMIN/CREAT 24H UR: 30 MG/G CREATININE (ref 0–30)
NONHDLC SERPL-MCNC: 153 MG/DL
PLATELET # BLD AUTO: 234 THOUSANDS/UL (ref 149–390)
PMV BLD AUTO: 9.9 FL (ref 8.9–12.7)
POTASSIUM SERPL-SCNC: 4 MMOL/L (ref 3.5–5.3)
PROT SERPL-MCNC: 7.2 G/DL (ref 6.4–8.2)
RBC # BLD AUTO: 4.67 MILLION/UL (ref 3.88–5.62)
SODIUM SERPL-SCNC: 138 MMOL/L (ref 136–145)
T4 FREE SERPL-MCNC: 0.92 NG/DL (ref 0.76–1.46)
TRIGL SERPL-MCNC: 88 MG/DL
TSH SERPL DL<=0.05 MIU/L-ACNC: 2.36 UIU/ML (ref 0.36–3.74)
WBC # BLD AUTO: 7.23 THOUSAND/UL (ref 4.31–10.16)

## 2020-07-07 PROCEDURE — 85027 COMPLETE CBC AUTOMATED: CPT

## 2020-07-07 PROCEDURE — 80061 LIPID PANEL: CPT

## 2020-07-07 PROCEDURE — 80053 COMPREHEN METABOLIC PANEL: CPT

## 2020-07-07 PROCEDURE — 83036 HEMOGLOBIN GLYCOSYLATED A1C: CPT

## 2020-07-07 PROCEDURE — 82306 VITAMIN D 25 HYDROXY: CPT

## 2020-07-07 PROCEDURE — 84443 ASSAY THYROID STIM HORMONE: CPT

## 2020-07-07 PROCEDURE — 84439 ASSAY OF FREE THYROXINE: CPT

## 2020-07-07 PROCEDURE — 36415 COLL VENOUS BLD VENIPUNCTURE: CPT

## 2020-07-07 PROCEDURE — 82570 ASSAY OF URINE CREATININE: CPT

## 2020-07-07 PROCEDURE — 82043 UR ALBUMIN QUANTITATIVE: CPT

## 2020-10-02 ENCOUNTER — OFFICE VISIT (OUTPATIENT)
Dept: URGENT CARE | Facility: CLINIC | Age: 75
End: 2020-10-02
Payer: COMMERCIAL

## 2020-10-02 ENCOUNTER — NURSE TRIAGE (OUTPATIENT)
Dept: OTHER | Facility: OTHER | Age: 75
End: 2020-10-02

## 2020-10-02 VITALS — RESPIRATION RATE: 18 BRPM | HEART RATE: 102 BPM | TEMPERATURE: 97.8 F | OXYGEN SATURATION: 95 %

## 2020-10-02 DIAGNOSIS — Z11.59 SCREENING FOR VIRAL DISEASE: ICD-10-CM

## 2020-10-02 DIAGNOSIS — B34.9 VIRAL SYNDROME: Primary | ICD-10-CM

## 2020-10-02 PROCEDURE — S9088 SERVICES PROVIDED IN URGENT: HCPCS | Performed by: PHYSICIAN ASSISTANT

## 2020-10-02 PROCEDURE — U0003 INFECTIOUS AGENT DETECTION BY NUCLEIC ACID (DNA OR RNA); SEVERE ACUTE RESPIRATORY SYNDROME CORONAVIRUS 2 (SARS-COV-2) (CORONAVIRUS DISEASE [COVID-19]), AMPLIFIED PROBE TECHNIQUE, MAKING USE OF HIGH THROUGHPUT TECHNOLOGIES AS DESCRIBED BY CMS-2020-01-R: HCPCS | Performed by: PHYSICIAN ASSISTANT

## 2020-10-02 PROCEDURE — 99203 OFFICE O/P NEW LOW 30 MIN: CPT | Performed by: PHYSICIAN ASSISTANT

## 2020-10-04 LAB — SARS-COV-2 RNA SPEC QL NAA+PROBE: NOT DETECTED

## 2020-11-04 ENCOUNTER — TRANSCRIBE ORDERS (OUTPATIENT)
Dept: LAB | Facility: CLINIC | Age: 75
End: 2020-11-04

## 2020-11-04 ENCOUNTER — LAB (OUTPATIENT)
Dept: LAB | Facility: CLINIC | Age: 75
End: 2020-11-04
Payer: COMMERCIAL

## 2020-11-04 DIAGNOSIS — R52 BODY ACHES: ICD-10-CM

## 2020-11-04 DIAGNOSIS — I10 HYPERTENSION, UNSPECIFIED TYPE: Primary | ICD-10-CM

## 2020-11-04 DIAGNOSIS — R53.83 FATIGUE, UNSPECIFIED TYPE: ICD-10-CM

## 2020-11-04 DIAGNOSIS — E78.5 HYPERLIPIDEMIA, UNSPECIFIED HYPERLIPIDEMIA TYPE: ICD-10-CM

## 2020-11-04 DIAGNOSIS — E11.9 TYPE 2 DIABETES MELLITUS WITHOUT COMPLICATION, WITHOUT LONG-TERM CURRENT USE OF INSULIN (HCC): ICD-10-CM

## 2020-11-04 DIAGNOSIS — Z79.899 ENCOUNTER FOR LONG-TERM (CURRENT) USE OF OTHER MEDICATIONS: ICD-10-CM

## 2020-11-04 LAB
ALBUMIN SERPL BCP-MCNC: 3.6 G/DL (ref 3.5–5)
ALP SERPL-CCNC: 79 U/L (ref 46–116)
ALT SERPL W P-5'-P-CCNC: 23 U/L (ref 12–78)
ANION GAP SERPL CALCULATED.3IONS-SCNC: 6 MMOL/L (ref 4–13)
AST SERPL W P-5'-P-CCNC: 12 U/L (ref 5–45)
BILIRUB SERPL-MCNC: 0.32 MG/DL (ref 0.2–1)
BUN SERPL-MCNC: 18 MG/DL (ref 5–25)
CALCIUM SERPL-MCNC: 8.9 MG/DL (ref 8.3–10.1)
CHLORIDE SERPL-SCNC: 102 MMOL/L (ref 100–108)
CHOLEST SERPL-MCNC: 171 MG/DL (ref 50–200)
CO2 SERPL-SCNC: 28 MMOL/L (ref 21–32)
CREAT SERPL-MCNC: 1.02 MG/DL (ref 0.6–1.3)
EST. AVERAGE GLUCOSE BLD GHB EST-MCNC: 151 MG/DL
GFR SERPL CREATININE-BSD FRML MDRD: 72 ML/MIN/1.73SQ M
GLUCOSE SERPL-MCNC: 181 MG/DL (ref 65–140)
HBA1C MFR BLD: 6.9 %
HDLC SERPL-MCNC: 42 MG/DL
LDLC SERPL CALC-MCNC: 97 MG/DL (ref 0–100)
NONHDLC SERPL-MCNC: 129 MG/DL
POTASSIUM SERPL-SCNC: 4 MMOL/L (ref 3.5–5.3)
PROT SERPL-MCNC: 7 G/DL (ref 6.4–8.2)
SODIUM SERPL-SCNC: 136 MMOL/L (ref 136–145)
TESTOST SERPL-MCNC: 463 NG/DL (ref 95–948)
TRIGL SERPL-MCNC: 159 MG/DL

## 2020-11-04 PROCEDURE — 80061 LIPID PANEL: CPT

## 2020-11-04 PROCEDURE — 36415 COLL VENOUS BLD VENIPUNCTURE: CPT

## 2020-11-04 PROCEDURE — 84403 ASSAY OF TOTAL TESTOSTERONE: CPT

## 2020-11-04 PROCEDURE — 83036 HEMOGLOBIN GLYCOSYLATED A1C: CPT

## 2020-11-04 PROCEDURE — 86618 LYME DISEASE ANTIBODY: CPT

## 2020-11-04 PROCEDURE — 80053 COMPREHEN METABOLIC PANEL: CPT

## 2020-11-05 LAB — B BURGDOR IGG+IGM SER-ACNC: 4

## 2021-01-20 DIAGNOSIS — Z23 ENCOUNTER FOR IMMUNIZATION: ICD-10-CM

## 2021-01-25 ENCOUNTER — IMMUNIZATIONS (OUTPATIENT)
Dept: FAMILY MEDICINE CLINIC | Facility: HOSPITAL | Age: 76
End: 2021-01-25

## 2021-01-25 DIAGNOSIS — Z23 ENCOUNTER FOR IMMUNIZATION: Primary | ICD-10-CM

## 2021-01-25 PROCEDURE — 0011A SARS-COV-2 / COVID-19 MRNA VACCINE (MODERNA) 100 MCG: CPT

## 2021-01-25 PROCEDURE — 91301 SARS-COV-2 / COVID-19 MRNA VACCINE (MODERNA) 100 MCG: CPT

## 2021-02-09 ENCOUNTER — TRANSCRIBE ORDERS (OUTPATIENT)
Dept: ADMINISTRATIVE | Facility: HOSPITAL | Age: 76
End: 2021-02-09

## 2021-02-09 DIAGNOSIS — E73.9 LACTOSE INTOLERANCE, UNSPECIFIED: Primary | ICD-10-CM

## 2021-02-22 ENCOUNTER — IMMUNIZATIONS (OUTPATIENT)
Dept: FAMILY MEDICINE CLINIC | Facility: HOSPITAL | Age: 76
End: 2021-02-22

## 2021-02-22 DIAGNOSIS — Z23 ENCOUNTER FOR IMMUNIZATION: Primary | ICD-10-CM

## 2021-02-22 PROCEDURE — 0012A SARS-COV-2 / COVID-19 MRNA VACCINE (MODERNA) 100 MCG: CPT

## 2021-02-22 PROCEDURE — 91301 SARS-COV-2 / COVID-19 MRNA VACCINE (MODERNA) 100 MCG: CPT

## 2021-03-24 ENCOUNTER — APPOINTMENT (EMERGENCY)
Dept: CT IMAGING | Facility: HOSPITAL | Age: 76
End: 2021-03-24
Payer: COMMERCIAL

## 2021-03-24 ENCOUNTER — HOSPITAL ENCOUNTER (EMERGENCY)
Facility: HOSPITAL | Age: 76
Discharge: OTHER INSTITUTION | End: 2021-03-24
Attending: EMERGENCY MEDICINE | Admitting: EMERGENCY MEDICINE
Payer: COMMERCIAL

## 2021-03-24 ENCOUNTER — APPOINTMENT (EMERGENCY)
Dept: RADIOLOGY | Facility: HOSPITAL | Age: 76
End: 2021-03-24
Payer: COMMERCIAL

## 2021-03-24 ENCOUNTER — APPOINTMENT (INPATIENT)
Dept: CT IMAGING | Facility: HOSPITAL | Age: 76
DRG: 872 | End: 2021-03-24
Payer: COMMERCIAL

## 2021-03-24 ENCOUNTER — HOSPITAL ENCOUNTER (INPATIENT)
Facility: HOSPITAL | Age: 76
LOS: 2 days | Discharge: HOME | DRG: 872 | End: 2021-03-26
Attending: INTERNAL MEDICINE | Admitting: INTERNAL MEDICINE
Payer: COMMERCIAL

## 2021-03-24 VITALS
TEMPERATURE: 103.3 F | BODY MASS INDEX: 26.14 KG/M2 | SYSTOLIC BLOOD PRESSURE: 160 MMHG | RESPIRATION RATE: 54 BRPM | DIASTOLIC BLOOD PRESSURE: 72 MMHG | HEART RATE: 130 BPM | OXYGEN SATURATION: 100 % | HEIGHT: 72 IN | WEIGHT: 193 LBS

## 2021-03-24 DIAGNOSIS — R50.9 HYPERTHERMIA: ICD-10-CM

## 2021-03-24 DIAGNOSIS — J96.90 RESPIRATORY FAILURE (HCC): ICD-10-CM

## 2021-03-24 DIAGNOSIS — K21.9 GERD (GASTROESOPHAGEAL REFLUX DISEASE): Primary | ICD-10-CM

## 2021-03-24 DIAGNOSIS — N39.0 UTI (URINARY TRACT INFECTION): ICD-10-CM

## 2021-03-24 DIAGNOSIS — A41.9 SEPSIS (HCC): Primary | ICD-10-CM

## 2021-03-24 PROBLEM — T50.905A DRUG REACTION: Status: ACTIVE | Noted: 2021-03-24

## 2021-03-24 PROBLEM — N17.9 AKI (ACUTE KIDNEY INJURY) (HCC): Status: ACTIVE | Noted: 2021-03-24

## 2021-03-24 PROBLEM — E87.1 HYPONATREMIA: Status: ACTIVE | Noted: 2021-03-24

## 2021-03-24 PROBLEM — R91.1 PULMONARY NODULE: Status: ACTIVE | Noted: 2021-03-24

## 2021-03-24 LAB
ALBUMIN SERPL BCP-MCNC: 3 G/DL (ref 3.5–5)
ALBUMIN SERPL BCP-MCNC: 4 G/DL (ref 3.5–5.7)
ALP SERPL-CCNC: 47 U/L (ref 55–165)
ALP SERPL-CCNC: 56 U/L (ref 46–116)
ALT SERPL W P-5'-P-CCNC: 19 U/L (ref 7–52)
ALT SERPL W P-5'-P-CCNC: 24 U/L (ref 12–78)
ANION GAP SERPL CALCULATED.3IONS-SCNC: 11 MMOL/L (ref 4–13)
ANION GAP SERPL CALCULATED.3IONS-SCNC: 9 MMOL/L (ref 4–13)
APTT PPP: 28 SECONDS (ref 23–37)
ARTERIAL PATENCY WRIST A: YES
AST SERPL W P-5'-P-CCNC: 15 U/L (ref 13–39)
AST SERPL W P-5'-P-CCNC: 19 U/L (ref 5–45)
ATRIAL RATE: 114 BPM
ATRIAL RATE: 133 BPM
ATRIAL RATE: 148 BPM
BACTERIA UR QL AUTO: ABNORMAL /HPF
BASE EX.OXY STD BLDV CALC-SCNC: 72 %
BASE EXCESS BLDA CALC-SCNC: -6.8 MMOL/L (ref -2–3)
BASE EXCESS BLDV CALC-SCNC: -2.6 MMOL/L
BASOPHILS # BLD AUTO: 0.02 THOUSANDS/ΜL (ref 0–0.1)
BASOPHILS NFR BLD AUTO: 0 % (ref 0–1)
BILIRUB SERPL-MCNC: 0.52 MG/DL (ref 0.2–1)
BILIRUB SERPL-MCNC: 0.6 MG/DL (ref 0.2–1)
BILIRUB UR QL STRIP: NEGATIVE
BNP SERPL-MCNC: 165 PG/ML (ref 1–100)
BUN SERPL-MCNC: 13 MG/DL (ref 5–25)
BUN SERPL-MCNC: 17 MG/DL (ref 7–25)
CA-I BLD-SCNC: 1.03 MMOL/L (ref 1.12–1.32)
CALCIUM ALBUM COR SERPL-MCNC: 9.1 MG/DL (ref 8.3–10.1)
CALCIUM SERPL-MCNC: 8.3 MG/DL (ref 8.3–10.1)
CALCIUM SERPL-MCNC: 9.3 MG/DL (ref 8.6–10.5)
CHLORIDE SERPL-SCNC: 103 MMOL/L (ref 100–108)
CHLORIDE SERPL-SCNC: 95 MMOL/L (ref 98–107)
CK SERPL-CCNC: 71 U/L (ref 30–308)
CLARITY UR: CLEAR
CO2 SERPL-SCNC: 20 MMOL/L (ref 21–32)
CO2 SERPL-SCNC: 23 MMOL/L (ref 21–31)
COLOR UR: YELLOW
CREAT SERPL-MCNC: 1.17 MG/DL (ref 0.6–1.3)
CREAT SERPL-MCNC: 1.24 MG/DL (ref 0.7–1.3)
CRP SERPL QL: 197.6 MG/L
EOSINOPHIL # BLD AUTO: 0 THOUSAND/ΜL (ref 0–0.61)
EOSINOPHIL NFR BLD AUTO: 0 % (ref 0–6)
ERYTHROCYTE [DISTWIDTH] IN BLOOD BY AUTOMATED COUNT: 13 % (ref 11.6–15.1)
ERYTHROCYTE [DISTWIDTH] IN BLOOD BY AUTOMATED COUNT: 13.5 % (ref 11.5–14.5)
FLUAV RNA RESP QL NAA+PROBE: NEGATIVE
FLUBV RNA RESP QL NAA+PROBE: NEGATIVE
GFR SERPL CREATININE-BSD FRML MDRD: 57 ML/MIN/1.73SQ M
GFR SERPL CREATININE-BSD FRML MDRD: 61 ML/MIN/1.73SQ M
GLUCOSE SERPL-MCNC: 131 MG/DL (ref 65–140)
GLUCOSE SERPL-MCNC: 132 MG/DL (ref 65–140)
GLUCOSE SERPL-MCNC: 146 MG/DL (ref 65–140)
GLUCOSE SERPL-MCNC: 148 MG/DL (ref 65–140)
GLUCOSE SERPL-MCNC: 186 MG/DL (ref 65–140)
GLUCOSE SERPL-MCNC: 188 MG/DL (ref 65–140)
GLUCOSE SERPL-MCNC: 259 MG/DL (ref 65–99)
GLUCOSE UR STRIP-MCNC: ABNORMAL MG/DL
HCO3 BLDA-SCNC: 17.8 MMOL/L (ref 22–28)
HCO3 BLDV-SCNC: 22.1 MMOL/L (ref 24–30)
HCT VFR BLD AUTO: 39.1 % (ref 36.5–49.3)
HCT VFR BLD AUTO: 40.7 % (ref 42–47)
HGB BLD-MCNC: 13.2 G/DL (ref 12–17)
HGB BLD-MCNC: 14.3 G/DL (ref 14–18)
HGB UR QL STRIP.AUTO: ABNORMAL
IMM GRANULOCYTES # BLD AUTO: 0.05 THOUSAND/UL (ref 0–0.2)
IMM GRANULOCYTES NFR BLD AUTO: 1 % (ref 0–2)
INR PPP: 1.16 (ref 0.84–1.19)
KETONES UR STRIP-MCNC: ABNORMAL MG/DL
LACTATE SERPL-SCNC: 2 MMOL/L (ref 0.5–2)
LACTATE SERPL-SCNC: 2.5 MMOL/L (ref 0.5–2)
LACTATE SERPL-SCNC: 2.5 MMOL/L (ref 0.5–2)
LEUKOCYTE ESTERASE UR QL STRIP: NEGATIVE
LIPASE SERPL-CCNC: <10 U/L (ref 11–82)
LYMPHOCYTES # BLD AUTO: 0.14 THOUSAND/UL (ref 0.6–4.47)
LYMPHOCYTES # BLD AUTO: 0.18 THOUSANDS/ΜL (ref 0.6–4.47)
LYMPHOCYTES # BLD AUTO: 2 % (ref 20–51)
LYMPHOCYTES NFR BLD AUTO: 3 % (ref 14–44)
MAGNESIUM SERPL-MCNC: 1.6 MG/DL (ref 1.9–2.7)
MAGNESIUM SERPL-MCNC: 2.1 MG/DL (ref 1.6–2.6)
MCH RBC QN AUTO: 32.4 PG (ref 26.8–34.3)
MCH RBC QN AUTO: 32.4 PG (ref 26–34)
MCHC RBC AUTO-ENTMCNC: 33.8 G/DL (ref 31.4–37.4)
MCHC RBC AUTO-ENTMCNC: 35.2 G/DL (ref 31–37)
MCV RBC AUTO: 92 FL (ref 81–99)
MCV RBC AUTO: 96 FL (ref 82–98)
MONOCYTES # BLD AUTO: 0.25 THOUSAND/ΜL (ref 0.17–1.22)
MONOCYTES # BLD AUTO: 0.34 THOUSAND/UL (ref 0–1.22)
MONOCYTES NFR BLD AUTO: 4 % (ref 4–12)
MONOCYTES NFR BLD AUTO: 5 % (ref 4–12)
MUCOUS THREADS UR QL AUTO: ABNORMAL
NEUTROPHILS # BLD AUTO: 5.95 THOUSANDS/ΜL (ref 1.85–7.62)
NEUTS SEG # BLD: 6.32 THOUSAND/UL (ref 1.81–6.82)
NEUTS SEG NFR BLD AUTO: 92 % (ref 43–75)
NEUTS SEG NFR BLD AUTO: 93 % (ref 42–75)
NITRITE UR QL STRIP: NEGATIVE
NON VENT TYPE- NRB: 100
NON-SQ EPI CELLS URNS QL MICRO: ABNORMAL /HPF
NRBC BLD AUTO-RTO: 0 /100 WBCS
O2 CT BLDA-SCNC: 19.4 ML/DL
O2 CT BLDV-SCNC: 14.7 ML/DL
OXYHGB MFR BLDA: 95.5 % (ref 94–100)
P AXIS: 70 DEGREES
P AXIS: 74 DEGREES
P AXIS: 79 DEGREES
PCO2 BLDA: 34.3 MM HG (ref 35–45)
PCO2 BLDV: 37.2 MM HG
PH BLDA: 7.33 [PH] (ref 7.35–7.45)
PH BLDV: 7.39 [PH] (ref 7.3–7.4)
PH UR STRIP.AUTO: 7 [PH]
PLATELET # BLD AUTO: 150 THOUSANDS/UL (ref 149–390)
PLATELET # BLD AUTO: 169 THOUSANDS/UL (ref 149–390)
PLATELET BLD QL SMEAR: ADEQUATE
PMV BLD AUTO: 6.8 FL (ref 8.6–11.7)
PMV BLD AUTO: 9 FL (ref 8.9–12.7)
PO2 BLDA: 367 MM HG (ref 80–100)
PO2 BLDV: 44 MM HG (ref 35–45)
POTASSIUM SERPL-SCNC: 4.3 MMOL/L (ref 3.5–5.5)
POTASSIUM SERPL-SCNC: 4.4 MMOL/L (ref 3.5–5.3)
PR INTERVAL: 134 MS
PR INTERVAL: 140 MS
PR INTERVAL: 150 MS
PROCALCITONIN SERPL-MCNC: 1.33 NG/ML
PROCALCITONIN SERPL-MCNC: 2.32 NG/ML
PROT SERPL-MCNC: 6.3 G/DL (ref 6.4–8.2)
PROT SERPL-MCNC: 6.7 G/DL (ref 6.4–8.9)
PROT UR STRIP-MCNC: ABNORMAL MG/DL
PROTHROMBIN TIME: 14.7 SECONDS (ref 11.6–14.5)
QRS AXIS: 81 DEGREES
QRS AXIS: 88 DEGREES
QRS AXIS: 90 DEGREES
QRSD INTERVAL: 70 MS
QRSD INTERVAL: 72 MS
QRSD INTERVAL: 76 MS
QT INTERVAL: 260 MS
QT INTERVAL: 294 MS
QT INTERVAL: 300 MS
QTC INTERVAL: 408 MS
QTC INTERVAL: 413 MS
QTC INTERVAL: 437 MS
RBC # BLD AUTO: 4.08 MILLION/UL (ref 3.88–5.62)
RBC # BLD AUTO: 4.43 MILLION/UL (ref 4.3–5.9)
RBC #/AREA URNS AUTO: ABNORMAL /HPF
RBC MORPH BLD: NORMAL
RSV RNA RESP QL NAA+PROBE: NEGATIVE
SARS-COV-2 RNA RESP QL NAA+PROBE: NEGATIVE
SODIUM SERPL-SCNC: 127 MMOL/L (ref 134–143)
SODIUM SERPL-SCNC: 134 MMOL/L (ref 136–145)
SP GR UR STRIP.AUTO: 1.02 (ref 1–1.03)
SPECIMEN SOURCE: ABNORMAL
T WAVE AXIS: -66 DEGREES
T WAVE AXIS: 49 DEGREES
T WAVE AXIS: 62 DEGREES
TOTAL CELLS COUNTED SPEC: 100
TROPONIN I SERPL-MCNC: <0.03 NG/ML
TSH SERPL DL<=0.05 MIU/L-ACNC: 0.64 UIU/ML (ref 0.36–3.74)
UROBILINOGEN UR QL STRIP.AUTO: 0.2 E.U./DL
VENTRICULAR RATE: 114 BPM
VENTRICULAR RATE: 133 BPM
VENTRICULAR RATE: 148 BPM
WBC # BLD AUTO: 6.45 THOUSAND/UL (ref 4.31–10.16)
WBC # BLD AUTO: 6.8 THOUSAND/UL (ref 4.8–10.8)
WBC #/AREA URNS AUTO: ABNORMAL /HPF

## 2021-03-24 PROCEDURE — 71250 CT THORAX DX C-: CPT

## 2021-03-24 PROCEDURE — 83690 ASSAY OF LIPASE: CPT | Performed by: EMERGENCY MEDICINE

## 2021-03-24 PROCEDURE — 84145 PROCALCITONIN (PCT): CPT | Performed by: NURSE PRACTITIONER

## 2021-03-24 PROCEDURE — 86140 C-REACTIVE PROTEIN: CPT | Performed by: NURSE PRACTITIONER

## 2021-03-24 PROCEDURE — 85025 COMPLETE CBC W/AUTO DIFF WBC: CPT | Performed by: NURSE PRACTITIONER

## 2021-03-24 PROCEDURE — G1004 CDSM NDSC: HCPCS

## 2021-03-24 PROCEDURE — 82948 REAGENT STRIP/BLOOD GLUCOSE: CPT

## 2021-03-24 PROCEDURE — 81003 URINALYSIS AUTO W/O SCOPE: CPT | Performed by: EMERGENCY MEDICINE

## 2021-03-24 PROCEDURE — 36415 COLL VENOUS BLD VENIPUNCTURE: CPT | Performed by: EMERGENCY MEDICINE

## 2021-03-24 PROCEDURE — 84443 ASSAY THYROID STIM HORMONE: CPT | Performed by: INTERNAL MEDICINE

## 2021-03-24 PROCEDURE — 82550 ASSAY OF CK (CPK): CPT | Performed by: EMERGENCY MEDICINE

## 2021-03-24 PROCEDURE — 84484 ASSAY OF TROPONIN QUANT: CPT | Performed by: EMERGENCY MEDICINE

## 2021-03-24 PROCEDURE — 99292 CRITICAL CARE ADDL 30 MIN: CPT | Performed by: EMERGENCY MEDICINE

## 2021-03-24 PROCEDURE — 99291 CRITICAL CARE FIRST HOUR: CPT

## 2021-03-24 PROCEDURE — 83605 ASSAY OF LACTIC ACID: CPT | Performed by: NURSE PRACTITIONER

## 2021-03-24 PROCEDURE — 96361 HYDRATE IV INFUSION ADD-ON: CPT

## 2021-03-24 PROCEDURE — 70450 CT HEAD/BRAIN W/O DYE: CPT

## 2021-03-24 PROCEDURE — 93005 ELECTROCARDIOGRAM TRACING: CPT

## 2021-03-24 PROCEDURE — 0241U HB NFCT DS VIR RESP RNA 4 TRGT: CPT | Performed by: EMERGENCY MEDICINE

## 2021-03-24 PROCEDURE — 83880 ASSAY OF NATRIURETIC PEPTIDE: CPT | Performed by: EMERGENCY MEDICINE

## 2021-03-24 PROCEDURE — 87040 BLOOD CULTURE FOR BACTERIA: CPT | Performed by: EMERGENCY MEDICINE

## 2021-03-24 PROCEDURE — 82805 BLOOD GASES W/O2 SATURATION: CPT | Performed by: EMERGENCY MEDICINE

## 2021-03-24 PROCEDURE — 83735 ASSAY OF MAGNESIUM: CPT | Performed by: NURSE PRACTITIONER

## 2021-03-24 PROCEDURE — 94002 VENT MGMT INPAT INIT DAY: CPT

## 2021-03-24 PROCEDURE — 96368 THER/DIAG CONCURRENT INF: CPT

## 2021-03-24 PROCEDURE — 85027 COMPLETE CBC AUTOMATED: CPT | Performed by: EMERGENCY MEDICINE

## 2021-03-24 PROCEDURE — 87086 URINE CULTURE/COLONY COUNT: CPT | Performed by: NURSE PRACTITIONER

## 2021-03-24 PROCEDURE — 80053 COMPREHEN METABOLIC PANEL: CPT | Performed by: NURSE PRACTITIONER

## 2021-03-24 PROCEDURE — 80053 COMPREHEN METABOLIC PANEL: CPT | Performed by: EMERGENCY MEDICINE

## 2021-03-24 PROCEDURE — 85730 THROMBOPLASTIN TIME PARTIAL: CPT | Performed by: EMERGENCY MEDICINE

## 2021-03-24 PROCEDURE — 71045 X-RAY EXAM CHEST 1 VIEW: CPT

## 2021-03-24 PROCEDURE — 94760 N-INVAS EAR/PLS OXIMETRY 1: CPT

## 2021-03-24 PROCEDURE — 74176 CT ABD & PELVIS W/O CONTRAST: CPT

## 2021-03-24 PROCEDURE — 83735 ASSAY OF MAGNESIUM: CPT | Performed by: EMERGENCY MEDICINE

## 2021-03-24 PROCEDURE — 96367 TX/PROPH/DG ADDL SEQ IV INF: CPT

## 2021-03-24 PROCEDURE — 96375 TX/PRO/DX INJ NEW DRUG ADDON: CPT

## 2021-03-24 PROCEDURE — 81001 URINALYSIS AUTO W/SCOPE: CPT | Performed by: EMERGENCY MEDICINE

## 2021-03-24 PROCEDURE — 84145 PROCALCITONIN (PCT): CPT | Performed by: EMERGENCY MEDICINE

## 2021-03-24 PROCEDURE — 93010 ELECTROCARDIOGRAM REPORT: CPT | Performed by: INTERNAL MEDICINE

## 2021-03-24 PROCEDURE — 99291 CRITICAL CARE FIRST HOUR: CPT | Performed by: EMERGENCY MEDICINE

## 2021-03-24 PROCEDURE — 85610 PROTHROMBIN TIME: CPT | Performed by: EMERGENCY MEDICINE

## 2021-03-24 PROCEDURE — 82330 ASSAY OF CALCIUM: CPT | Performed by: NURSE PRACTITIONER

## 2021-03-24 PROCEDURE — 83605 ASSAY OF LACTIC ACID: CPT | Performed by: EMERGENCY MEDICINE

## 2021-03-24 PROCEDURE — 85007 BL SMEAR W/DIFF WBC COUNT: CPT | Performed by: EMERGENCY MEDICINE

## 2021-03-24 PROCEDURE — 94640 AIRWAY INHALATION TREATMENT: CPT

## 2021-03-24 PROCEDURE — 99291 CRITICAL CARE FIRST HOUR: CPT | Performed by: NURSE PRACTITIONER

## 2021-03-24 PROCEDURE — 96365 THER/PROPH/DIAG IV INF INIT: CPT

## 2021-03-24 RX ORDER — SODIUM CHLORIDE 9 MG/ML
200 INJECTION, SOLUTION INTRAVENOUS CONTINUOUS
Status: DISCONTINUED | OUTPATIENT
Start: 2021-03-24 | End: 2021-03-24 | Stop reason: HOSPADM

## 2021-03-24 RX ORDER — LEVALBUTEROL 1.25 MG/.5ML
1.25 SOLUTION, CONCENTRATE RESPIRATORY (INHALATION) EVERY 8 HOURS PRN
Status: DISCONTINUED | OUTPATIENT
Start: 2021-03-24 | End: 2021-03-26 | Stop reason: HOSPADM

## 2021-03-24 RX ORDER — ONDANSETRON 2 MG/ML
1 INJECTION INTRAMUSCULAR; INTRAVENOUS ONCE
Status: COMPLETED | OUTPATIENT
Start: 2021-03-24 | End: 2021-03-24

## 2021-03-24 RX ORDER — SODIUM CHLORIDE, SODIUM LACTATE, POTASSIUM CHLORIDE, CALCIUM CHLORIDE 600; 310; 30; 20 MG/100ML; MG/100ML; MG/100ML; MG/100ML
100 INJECTION, SOLUTION INTRAVENOUS CONTINUOUS
Status: DISCONTINUED | OUTPATIENT
Start: 2021-03-24 | End: 2021-03-24

## 2021-03-24 RX ORDER — ALBUTEROL SULFATE 90 UG/1
2 AEROSOL, METERED RESPIRATORY (INHALATION) EVERY 4 HOURS PRN
Status: DISCONTINUED | OUTPATIENT
Start: 2021-03-24 | End: 2021-03-26 | Stop reason: HOSPADM

## 2021-03-24 RX ORDER — MONTELUKAST SODIUM 10 MG/1
10 TABLET ORAL DAILY
Status: DISCONTINUED | OUTPATIENT
Start: 2021-03-24 | End: 2021-03-26 | Stop reason: HOSPADM

## 2021-03-24 RX ORDER — MAGNESIUM SULFATE 1 G/100ML
1 INJECTION INTRAVENOUS ONCE
Status: COMPLETED | OUTPATIENT
Start: 2021-03-24 | End: 2021-03-24

## 2021-03-24 RX ORDER — LEVALBUTEROL 1.25 MG/.5ML
1.25 SOLUTION, CONCENTRATE RESPIRATORY (INHALATION) EVERY 4 HOURS PRN
Status: DISCONTINUED | OUTPATIENT
Start: 2021-03-24 | End: 2021-03-24

## 2021-03-24 RX ORDER — ASPIRIN 81 MG/1
81 TABLET, CHEWABLE ORAL DAILY
Status: DISCONTINUED | OUTPATIENT
Start: 2021-03-24 | End: 2021-03-26 | Stop reason: HOSPADM

## 2021-03-24 RX ORDER — SODIUM CHLORIDE FOR INHALATION 0.9 %
3 VIAL, NEBULIZER (ML) INHALATION EVERY 8 HOURS PRN
Status: DISCONTINUED | OUTPATIENT
Start: 2021-03-24 | End: 2021-03-26 | Stop reason: HOSPADM

## 2021-03-24 RX ORDER — DIPHENHYDRAMINE HYDROCHLORIDE 50 MG/ML
50 INJECTION INTRAMUSCULAR; INTRAVENOUS ONCE
Status: COMPLETED | OUTPATIENT
Start: 2021-03-24 | End: 2021-03-24

## 2021-03-24 RX ORDER — ACETAMINOPHEN 325 MG/1
975 TABLET ORAL EVERY 6 HOURS PRN
Status: DISCONTINUED | OUTPATIENT
Start: 2021-03-24 | End: 2021-03-26 | Stop reason: HOSPADM

## 2021-03-24 RX ORDER — ATENOLOL 25 MG/1
25 TABLET ORAL DAILY
Status: DISCONTINUED | OUTPATIENT
Start: 2021-03-24 | End: 2021-03-26 | Stop reason: HOSPADM

## 2021-03-24 RX ORDER — CHLORHEXIDINE GLUCONATE 0.12 MG/ML
15 RINSE ORAL EVERY 12 HOURS SCHEDULED
Status: DISCONTINUED | OUTPATIENT
Start: 2021-03-24 | End: 2021-03-24

## 2021-03-24 RX ORDER — IPRATROPIUM BROMIDE AND ALBUTEROL SULFATE 2.5; .5 MG/3ML; MG/3ML
3 SOLUTION RESPIRATORY (INHALATION) ONCE
Status: COMPLETED | OUTPATIENT
Start: 2021-03-24 | End: 2021-03-24

## 2021-03-24 RX ORDER — ACETAMINOPHEN 650 MG/1
650 SUPPOSITORY RECTAL ONCE
Status: COMPLETED | OUTPATIENT
Start: 2021-03-24 | End: 2021-03-24

## 2021-03-24 RX ORDER — SODIUM CHLORIDE 9 MG/ML
100 INJECTION, SOLUTION INTRAVENOUS CONTINUOUS
Status: DISCONTINUED | OUTPATIENT
Start: 2021-03-24 | End: 2021-03-24

## 2021-03-24 RX ORDER — CEFEPIME HYDROCHLORIDE 2 G/50ML
2000 INJECTION, SOLUTION INTRAVENOUS ONCE
Status: COMPLETED | OUTPATIENT
Start: 2021-03-24 | End: 2021-03-24

## 2021-03-24 RX ORDER — AMLODIPINE BESYLATE 5 MG/1
5 TABLET ORAL DAILY
Status: DISCONTINUED | OUTPATIENT
Start: 2021-03-24 | End: 2021-03-26 | Stop reason: HOSPADM

## 2021-03-24 RX ORDER — HEPARIN SODIUM 5000 [USP'U]/ML
5000 INJECTION, SOLUTION INTRAVENOUS; SUBCUTANEOUS EVERY 8 HOURS SCHEDULED
Status: DISCONTINUED | OUTPATIENT
Start: 2021-03-24 | End: 2021-03-26 | Stop reason: HOSPADM

## 2021-03-24 RX ADMIN — INSULIN LISPRO 1 UNITS: 100 INJECTION, SOLUTION INTRAVENOUS; SUBCUTANEOUS at 17:01

## 2021-03-24 RX ADMIN — SODIUM CHLORIDE 1000 ML: 0.9 INJECTION, SOLUTION INTRAVENOUS at 01:15

## 2021-03-24 RX ADMIN — DIPHENHYDRAMINE HYDROCHLORIDE 50 MG: 50 INJECTION, SOLUTION INTRAMUSCULAR; INTRAVENOUS at 02:29

## 2021-03-24 RX ADMIN — IPRATROPIUM BROMIDE AND ALBUTEROL SULFATE 3 ML: .5; 3 SOLUTION RESPIRATORY (INHALATION) at 01:47

## 2021-03-24 RX ADMIN — INSULIN LISPRO 1 UNITS: 100 INJECTION, SOLUTION INTRAVENOUS; SUBCUTANEOUS at 12:51

## 2021-03-24 RX ADMIN — ASPIRIN 81 MG: 81 TABLET, CHEWABLE ORAL at 08:46

## 2021-03-24 RX ADMIN — ISODIUM CHLORIDE 3 ML: 0.03 SOLUTION RESPIRATORY (INHALATION) at 05:42

## 2021-03-24 RX ADMIN — ACETAMINOPHEN 975 MG: 325 TABLET, FILM COATED ORAL at 07:18

## 2021-03-24 RX ADMIN — LEVALBUTEROL HYDROCHLORIDE 1.25 MG: 1.25 SOLUTION, CONCENTRATE RESPIRATORY (INHALATION) at 05:42

## 2021-03-24 RX ADMIN — FAMOTIDINE 20 MG: 10 INJECTION INTRAVENOUS at 02:43

## 2021-03-24 RX ADMIN — VANCOMYCIN HYDROCHLORIDE 1750 MG: 1 INJECTION, POWDER, LYOPHILIZED, FOR SOLUTION INTRAVENOUS at 02:48

## 2021-03-24 RX ADMIN — SODIUM CHLORIDE, SODIUM LACTATE, POTASSIUM CHLORIDE, AND CALCIUM CHLORIDE 500 ML: .6; .31; .03; .02 INJECTION, SOLUTION INTRAVENOUS at 10:37

## 2021-03-24 RX ADMIN — HEPARIN SODIUM 5000 UNITS: 5000 INJECTION INTRAVENOUS; SUBCUTANEOUS at 13:04

## 2021-03-24 RX ADMIN — MAGNESIUM SULFATE IN DEXTROSE 1 G: 10 INJECTION, SOLUTION INTRAVENOUS at 01:40

## 2021-03-24 RX ADMIN — SODIUM CHLORIDE, SODIUM LACTATE, POTASSIUM CHLORIDE, AND CALCIUM CHLORIDE 100 ML/HR: .6; .31; .03; .02 INJECTION, SOLUTION INTRAVENOUS at 05:46

## 2021-03-24 RX ADMIN — MONTELUKAST SODIUM 10 MG: 10 TABLET, COATED ORAL at 08:46

## 2021-03-24 RX ADMIN — ALBUTEROL SULFATE 2 PUFF: 90 AEROSOL, METERED RESPIRATORY (INHALATION) at 07:18

## 2021-03-24 RX ADMIN — ATENOLOL 25 MG: 25 TABLET ORAL at 08:46

## 2021-03-24 RX ADMIN — HEPARIN SODIUM 5000 UNITS: 5000 INJECTION INTRAVENOUS; SUBCUTANEOUS at 21:26

## 2021-03-24 RX ADMIN — ACETAMINOPHEN 650 MG: 325 TABLET, FILM COATED ORAL at 18:59

## 2021-03-24 RX ADMIN — CEFTRIAXONE SODIUM 1000 MG: 10 INJECTION, POWDER, FOR SOLUTION INTRAVENOUS at 17:08

## 2021-03-24 RX ADMIN — HEPARIN SODIUM 5000 UNITS: 5000 INJECTION INTRAVENOUS; SUBCUTANEOUS at 05:46

## 2021-03-24 RX ADMIN — CEFEPIME HYDROCHLORIDE 2000 MG: 2 INJECTION, SOLUTION INTRAVENOUS at 01:53

## 2021-03-24 RX ADMIN — MAGNESIUM SULFATE HEPTAHYDRATE 1 G: 1 INJECTION, SOLUTION INTRAVENOUS at 02:48

## 2021-03-24 RX ADMIN — SODIUM CHLORIDE 1000 ML: 0.9 INJECTION, SOLUTION INTRAVENOUS at 01:45

## 2021-03-24 RX ADMIN — SODIUM CHLORIDE 200 ML/HR: 0.9 INJECTION, SOLUTION INTRAVENOUS at 02:43

## 2021-03-24 RX ADMIN — CHLORHEXIDINE GLUCONATE 0.12% ORAL RINSE 15 ML: 1.2 LIQUID ORAL at 08:48

## 2021-03-24 RX ADMIN — AMLODIPINE BESYLATE 5 MG: 5 TABLET ORAL at 08:46

## 2021-03-24 RX ADMIN — ACETAMINOPHEN 650 MG: 650 SUPPOSITORY RECTAL at 02:12

## 2021-03-24 NOTE — ASSESSMENT & PLAN NOTE
· The patient arrived as a transfer from [facility] after he presented there with a c/o generalized weakness and fever to 107  He will be admitted tot he stepdown unit for evaluation and monitoring   This will likely require greater than a 2 midnight stay therefore he will be placed in inpatient status  · The etiology of his fever may be related to the UTI he was diagnosed with on 3/22  · We will place him on cefepime for now pending his culture results  · Blood cultures are pending  · Will repeat his lactic acid level now and trend  · We will continue with hydration  · We will treat the fever with tylenol and cooling measures

## 2021-03-24 NOTE — EMTALA/ACUTE CARE TRANSFER
[facility]  [address] [address] [phone]  Dept: [phone]      [address] TRANSFER CONSENT    NAME Batsheva RODRIGES 1945                              MRN [id number]    I have been informed of my rights regarding examination, treatment, and transfer   by Dr Miller Doan MD    Benefits: Specialized equipment and/or services available at the receiving facility (Include comment)________________________    Risks: Potential for delay in receiving treatment, Potential deterioration of medical condition, Loss of IV, Increased discomfort during transfer, Possible worsening of condition or death during transfer      Consent for Transfer:  I acknowledge that my medical condition has been evaluated and explained to me by the emergency department physician or other qualified medical person and/or my attending physician, who has recommended that I be transferred to the service of  Accepting Physician: Dr Eda Giron at [address] [place] [address] Name, [address] : [place]  The above potential benefits of such transfer, the potential risks associated with such transfer, and the probable risks of not being transferred have been explained to me, and I fully understand them  The doctor has explained that, in my case, the benefits of transfer outweigh the risks  I agree to be transferred  I authorize the performance of emergency medical procedures and treatments upon me in both transit and upon arrival at the receiving facility  Additionally, I authorize the release of any and all medical records to the receiving facility and request they be transported with me, if possible  I understand that the safest mode of transportation during a medical emergency is an ambulance and that the Hospital advocates the use of this mode of transport   Risks of traveling to the receiving facility by car, including absence of medical control, life sustaining equipment, such as oxygen, and medical personnel has been explained to me and I fully understand them  (FILOMENA CORRECT BOX BELOW)  [  ]  I consent to the stated transfer and to be transported by ambulance/helicopter  [  ]  I consent to the stated transfer, but refuse transportation by ambulance and accept full responsibility for my transportation by car  I understand the risks of non-ambulance transfers and I exonerate the Hospital and its staff from any deterioration in my condition that results from this refusal     X___________________________________________    DATE  21  TIME________  Signature of patient or legally responsible individual signing on patient behalf           RELATIONSHIP TO PATIENT_________________________          Provider Certification    NAME Yoni Millan                                         1945                              MRN [id number]    A medical screening exam was performed on the above named patient  Based on the examination:    Condition Necessitating Transfer The primary encounter diagnosis was Sepsis ([address] )  Diagnoses of Hyperthermia and Respiratory failure ([address] ) were also pertinent to this visit      Patient Condition: The patient has been stabilized such that within reasonable medical probability, no material deterioration of the patient condition or the condition of the unborn child(avis) is likely to result from the transfer    Reason for Transfer: Level of Care needed not available at this facility    Transfer Requirements: [address]   · Space available and qualified personnel available for treatment as acknowledged by    · Agreed to accept transfer and to provide appropriate medical treatment as acknowledged by       Dr Josué Zavaleta  · Appropriate medical records of the examination and treatment of the patient are provided at the time of transfer   [address] _______  · Transfer will be performed by qualified personnel from    and appropriate transfer equipment as required, including the use of necessary and appropriate life support measures  Provider Certification: I have examined the patient and explained the following risks and benefits of being transferred/refusing transfer to the patient/family:  General risk, such as traffic hazards, adverse weather conditions, rough terrain or turbulence, possible failure of equipment (including vehicle or aircraft), or consequences of actions of persons outside the control of the transport personnel, Unanticipated needs of medical equipment and personnel during transport, Risk of worsening condition, The possibility of a transport vehicle being unavailable      Based on these reasonable risks and benefits to the patient and/or the unborn child(avis), and based upon the information available at the time of the patients examination, I certify that the medical benefits reasonably to be expected from the provision of appropriate medical treatments at another medical facility outweigh the increasing risks, if any, to the individuals medical condition, and in the case of labor to the unborn child, from effecting the transfer      X____________________________________________ DATE 03/24/21        TIME_______      ORIGINAL - SEND TO MEDICAL RECORDS   COPY - SEND WITH PATIENT DURING TRANSFER

## 2021-03-24 NOTE — ASSESSMENT & PLAN NOTE
· The patient arrived as a transfer from [facility] after he presented there with a c/o generalized weakness and fever to 107  He will be admitted tot he stepdown unit for evaluation and monitoring   This will likely require greater than a 2 midnight stay therefore he will be placed in inpatient status  · The etiology of his fever may be related to the Bactrim that he was placed on as an outpatient for UTI on 3/22, per patients wife he had previously taken Bactrim and had a reaction with increasing lethargy and weakness  · Fevers now resolved   · Blood cultures are pending  · Continue to monitor WBC and fever curve

## 2021-03-24 NOTE — ASSESSMENT & PLAN NOTE
· The patients creatine was mildly elevated at 1 24 on admission, baseline appears to be 0 9-1   · We will monitor his urine output and renal indices closely  · Discontinue schmitt catheter

## 2021-03-24 NOTE — ASSESSMENT & PLAN NOTE
· Incidental finding on CT chest  · 3mm right upper lobe nodule  · Will need follow up outpatient for repeat CT in 12 months

## 2021-03-24 NOTE — ASSESSMENT & PLAN NOTE
· We will hold the patients lisinopril for now given his rising creatinine  · Will continue his outpatient amlodipine and tenorman

## 2021-03-24 NOTE — ASSESSMENT & PLAN NOTE
· The patients symptoms were preceded by bactrim administration which has been associated with fever    · In addition, the patient was also on zantac which can predispose to a drug fever  · We will hold these agents for now  · There is currently no sign of a rash to indicate acute febrile neutrophilic dermatosis which can be seen in bactrim administration  · The patient received benadryl, pepcid and steroids at [facility]  · We will continue prn benadryl for now  · We will hold on additional steroids

## 2021-03-24 NOTE — ED NOTES
Pt's spouse to nurses desk stating pt has increased work of breathing and "it's tight"  ED provider and this RN bedside  Pt placed on 2L nasal cannula  Repeat EKG performed        Steven Grey RN  03/24/21 1991

## 2021-03-24 NOTE — ED PROVIDER NOTES
History  Chief Complaint   Patient presents with    Weakness - Generalized       79-YEAR-OLD MALE    Past medical history  Hypertension  Non-insulin-dependent diabetes on metformin      CCl: HERE FOR GENERALIZED WEAKNESS and flu-like symptoms        MODE OF TRANSPORT  EMS    HPI  Patient started feeling sick yesterday  Weak, flu-like      PATIENT HAS HAD NO COMPLAINTS OF CHEST PAIN OR SHORTNESS OF BREATH  NO  COMPLAINTS OF DIAPHORESIS      HE DENIES ANY PAIN TO THE JAW NECK OR THE BACK  HE DENIES PAIN WITH DEEP BREATH      NO SYNCOPE OR NEAR SYNCOPE   NO PALPITATIONS      INTERVENTIONS: NONE    TRAUMA:  NONE  PATIENT DID NOT FALL OR HIT HER HEAD  NO SINUS SYMPTOMS  Mild HEADACHE  Denies NECK PAIN or stiffness    HE DENIES SORE THROAT, DENIES SINUS CONGESTION       VTE  RISK FACTORS:  NONE  NO HISTORY OF PE OR DVT  NO LONG CAR RIDES OR PLANE RIDES  NO IMMOBILIZATION  NO RECENT SURGERY OR TRAUMA  DENIES HEMOPTYSIS  OTHER ASSOCIATED SYMPTOMS:  NO ABDOMINAL PAIN  NO ACUTE BACK PAIN  NO NAUSEA OR VOMITING  NO SOURCES OF BLEEDING  NO BLACK OR BLOODY STOOLS  NO STOOL CHANGES  NO URINARY COMPLAINTS: NO DYSURIA, NO HEMATURIA, NO FREQUENCY      Patient has no rashes or wounds      Patient has no other complaints      Fatigue  Severity:  Moderate  Onset quality:  Gradual  Progression:  Worsening  Relieved by:  Nothing  Worsened by:  Nothing  Ineffective treatments:  None tried  Associated symptoms: urgency    Associated symptoms: no abdominal pain, no arthralgias, no chest pain, no cough, no diarrhea, no difficulty walking, no dizziness, no dysuria, no numbness in extremities, no fever, no frequency, no headaches, no hematochezia, no loss of consciousness, no melena, no myalgias, no nausea, no near-syncope, no sensory-motor deficit, no shortness of breath, no stroke symptoms, no syncope and no vomiting        Prior to Admission Medications   Prescriptions Last Dose Informant Patient Reported? Taking? albuterol (VENTOLIN HFA) 90 mcg/act inhaler   Yes No   Sig: Ventolin HFA 90 mcg/actuation aerosol inhaler   amLODIPine (NORVASC) 5 mg tablet   Yes No   Sig: amlodipine 5 mg tablet   aspirin 81 mg chewable tablet   No No   Sig: Chew 1 tablet (81 mg total) daily for 30 days   atenolol (TENORMIN) 25 mg tablet   Yes No   Sig: atenolol 25 mg tablet   glipiZIDE (GLUCOTROL XL) 2 5 mg 24 hr tablet   Yes No   Sig: glipizide ER 2 5 mg tablet, extended release 24 hr   hydrOXYzine HCL (ATARAX) 25 mg tablet   Yes No   Sig: hydroxyzine HCl 25 mg tablet   lisinopril (ZESTRIL) 20 mg tablet   Yes No   Sig: lisinopril 20 mg tablet   metFORMIN (GLUCOPHAGE) 500 mg tablet   Yes No   Sig: metformin 500 mg tablet   montelukast (SINGULAIR) 10 mg tablet   Yes No   Sig: montelukast 10 mg tablet   nitroglycerin (NITROSTAT) 0 4 mg SL tablet   No No   Sig: Place 1 tablet (0 4 mg total) under the tongue every 5 (five) minutes as needed for chest pain for up to 30 days   ranitidine (ZANTAC) 300 MG tablet   Yes No   Sig: ranitidine 300 mg tablet      Facility-Administered Medications: None       Past Medical History:   Diagnosis Date    Asthma     Diabetes mellitus ([address] )     Environmental allergies     Hyperlipidemia     Hypertension     Orthostatic hypotension     Osteopenia     Sinusitis        Past Surgical History:   Procedure Laterality Date    COLONOSCOPY      KNEE ARTHROPLASTY      VASECTOMY         Family History   Problem Relation Age of Onset    Asthma Mother     Cancer Father     Asthma Brother     Cancer Brother      I have reviewed and agree with the history as documented  E-Cigarette/Vaping     E-Cigarette/Vaping Substances     Social History     Tobacco Use    Smoking status: Never Smoker    Smokeless tobacco: Never Used   Substance Use Topics    Alcohol use: Never     Frequency: Never    Drug use: Never       Review of Systems   Constitutional: Negative for chills, diaphoresis, fatigue and fever  Respiratory: Negative for cough, shortness of breath, wheezing and stridor  Cardiovascular: Negative for chest pain, palpitations, leg swelling, syncope and near-syncope  Gastrointestinal: Negative for abdominal pain, blood in stool, diarrhea, hematochezia, melena, nausea and vomiting  Genitourinary: Positive for urgency  Negative for difficulty urinating, dysuria, flank pain and frequency  Musculoskeletal: Negative for arthralgias, back pain, gait problem, joint swelling, myalgias, neck pain and neck stiffness  Skin: Negative for rash and wound  Neurological: Positive for weakness  Negative for dizziness, loss of consciousness, light-headedness and headaches  All other systems reviewed and are negative  Physical Exam  Physical Exam  Constitutional:       General: He is not in acute distress  Appearance: He is well-developed  He is ill-appearing  He is not toxic-appearing or diaphoretic  HENT:      Head: Normocephalic and atraumatic  Nose: Nose normal       Mouth/Throat:      Pharynx: No oropharyngeal exudate  Eyes:      General: No scleral icterus  Right eye: No discharge  Left eye: No discharge  Conjunctiva/sclera: Conjunctivae normal       Pupils: Pupils are equal, round, and reactive to light  Neck:      Musculoskeletal: Normal range of motion and neck supple  No neck rigidity or muscular tenderness  Vascular: No JVD  Trachea: No tracheal deviation  Cardiovascular:      Rate and Rhythm: Regular rhythm  Tachycardia present  Comments: Full Exam deferred on initial exam due to use of PAPR  Pulmonary:      Effort: Pulmonary effort is normal  No respiratory distress  Breath sounds: No stridor  No rales  Comments: Full Exam deferred on initial exam due to use of PAPR  Abdominal:      General: There is no distension  Palpations: Abdomen is soft  There is no mass  Tenderness: There is no abdominal tenderness   There is no right CVA tenderness, left CVA tenderness, guarding or rebound  Hernia: No hernia is present  Comments: Full Exam deferred on initial exam due to use of PAPR   Musculoskeletal: Normal range of motion  General: No swelling, tenderness, deformity or signs of injury  Right lower leg: No edema  Left lower leg: No edema  Lymphadenopathy:      Cervical: No cervical adenopathy  Skin:     General: Skin is warm  Capillary Refill: Capillary refill takes less than 2 seconds  Coloration: Skin is not pale  Findings: No erythema or rash  Neurological:      General: No focal deficit present  Mental Status: He is alert and oriented to person, place, and time  Cranial Nerves: No cranial nerve deficit  Sensory: No sensory deficit  Motor: No abnormal muscle tone  Coordination: Coordination normal    Psychiatric:         Behavior: Behavior normal          Thought Content:  Thought content normal          Judgment: Judgment normal          Vital Signs  ED Triage Vitals [03/24/21 0054]   Temperature Pulse Respirations Blood Pressure SpO2   (!) 102 6 °F (39 2 °C) (!) 113 20 148/72 96 %      Temp Source Heart Rate Source Patient Position - Orthostatic VS BP Location FiO2 (%)   Temporal Monitor Sitting Left arm --      Pain Score       --           Vitals:    03/24/21 0230 03/24/21 0245 03/24/21 0300 03/24/21 0315   BP: (!) 202/91 160/95 (!) 183/80 160/72   Pulse: (!) 147 (!) 143 (!) 142 (!) 130   Patient Position - Orthostatic VS:             Visual Acuity      ED Medications  Medications   vancomycin (VANCOCIN) 1,750 mg in sodium chloride 0 9 % 500 mL IVPB (1,750 mg Intravenous New Bag 3/24/21 0248)   sodium chloride 0 9 % infusion (200 mL/hr Intravenous New Bag 3/24/21 0243)   ondansetron (FOR EMS ONLY) (ZOFRAN) 4 mg/2 mL injection 4 mg (0 mg Does not apply Given to EMS 3/24/21 0114)   sodium chloride 0 9 % bolus 1,000 mL (0 mL Intravenous Stopped 3/24/21 0206) Followed by   sodium chloride 0 9 % bolus 1,000 mL (0 mL Intravenous Stopped 3/24/21 0218)     Followed by   sodium chloride 0 9 % bolus 1,000 mL (0 mL Intravenous Stopped 3/24/21 0145)   cefepime (MAXIPIME) IVPB (premix in dextrose) 2,000 mg 50 mL (0 mg Intravenous Stopped 3/24/21 0223)   ipratropium-albuterol (DUO-NEB) 0 5-2 5 mg/3 mL inhalation solution 3 mL (3 mL Nebulization Given 3/24/21 0147)   magnesium sulfate IVPB (premix) SOLN 1 g (0 g Intravenous Stopped 3/24/21 0248)   magnesium sulfate IVPB (premix) SOLN 1 g (1 g Intravenous New Bag 3/24/21 0248)   acetaminophen (TYLENOL) rectal suppository 650 mg (650 mg Rectal Given 3/24/21 0212)   diphenhydrAMINE (BENADRYL) injection 50 mg (50 mg Intravenous Given 3/24/21 0229)   famotidine (PEPCID) injection 20 mg (20 mg Intravenous Given 3/24/21 0243)       Diagnostic Studies  Results Reviewed     Procedure Component Value Units Date/Time    Blood gas, arterial [[id number]]  (Abnormal) Collected: 03/24/21 0315    Lab Status: Final result Specimen: Blood, Arterial from Radial, Right Updated: 03/24/21 0327     pH, Arterial 7 330     pCO2, Arterial 34 3 mm Hg      pO2, Arterial 367 0 mm Hg      HCO3, Arterial 17 8 mmol/L      Base Excess, Arterial -6 8 mmol/L      O2 Content, Arterial 19 4 mL/dL      O2 HGB,Arterial  95 5 %      SOURCE Radial, Right     ESTRELLA TEST Yes     Non Vent type-     CK Total with Reflex CKMB [[id number]]  (Normal) Collected: 03/24/21 0107    Lab Status: Final result Specimen: Blood from Arm, Right Updated: 03/24/21 0233     Total CK 71 U/L     Manual Differential (Non Wam) [[id number]]  (Abnormal) Collected: 03/24/21 0107    Lab Status: Final result Specimen: Blood from Arm, Right Updated: 03/24/21 0206     Segmented % 93 %      Lymphocytes % 2 %      Monocytes % 5 %      Neutrophils Absolute 6 32 Thousand/uL      Lymphocytes Absolute 0 14 Thousand/uL      Monocytes Absolute 0 34 Thousand/uL      Total Counted 100     RBC Morphology Normal     Platelet Estimate Adequate    COVID19, Influenza A/B, RSV PCR, [facility] [[id number]]  (Normal) Collected: 03/24/21 0107    Lab Status: Final result Specimen: Nares from Nose Updated: 03/24/21 0200     SARS-CoV-2 Negative     INFLUENZA A PCR Negative     INFLUENZA B PCR Negative     RSV PCR Negative    Narrative: This test has been authorized by FDA under an EUA (Emergency Use Assay) for use by authorized laboratories  Clinical caution and judgement should be used with the interpretation of these results with consideration of the clinical impression and other laboratory testing  Testing reported as "Positive" or "Negative" has been proven to be accurate according to standard laboratory validation requirements  All testing is performed with control materials showing appropriate reactivity at standard intervals      Urine Microscopic [[id number]]  (Abnormal) Collected: 03/24/21 0109    Lab Status: Final result Specimen: Urine, Clean Catch Updated: 03/24/21 0151     RBC, UA 10-20 /hpf      WBC, UA None Seen /hpf      Epithelial Cells Occasional /hpf      Bacteria, UA None Seen /hpf      MUCUS THREADS Occasional    Magnesium [[id number]]  (Abnormal) Collected: 03/24/21 0107    Lab Status: Final result Specimen: Blood from Arm, Right Updated: 03/24/21 0148     Magnesium 1 6 mg/dL     Lipase [[id number]]  (Abnormal) Collected: 03/24/21 0107    Lab Status: Final result Specimen: Blood from Arm, Right Updated: 03/24/21 0148     Lipase <10 u/L     B-Type Natriuretic Peptide [facility] ONLY) [[id number]]  (Abnormal) Collected: 03/24/21 0107    Lab Status: Final result Specimen: Blood from Arm, Right Updated: 03/24/21 0148      pg/mL     Protime-INR [[id number]]  (Abnormal) Collected: 03/24/21 0107    Lab Status: Final result Specimen: Blood from Arm, Right Updated: 03/24/21 0147     Protime 14 7 seconds      INR 1 16    APTT [[id number]]  (Normal) Collected: 03/24/21 0107    Lab Status: Final result Specimen: Blood from Arm, Right Updated: 03/24/21 0147     PTT 28 seconds     Comprehensive metabolic panel [[id number]]  (Abnormal) Collected: 03/24/21 0107    Lab Status: Final result Specimen: Blood from Arm, Right Updated: 03/24/21 0147     Sodium 127 mmol/L      Potassium 4 3 mmol/L      Chloride 95 mmol/L      CO2 23 mmol/L      ANION GAP 9 mmol/L      BUN 17 mg/dL      Creatinine 1 24 mg/dL      Glucose 259 mg/dL      Calcium 9 3 mg/dL      AST 15 U/L      ALT 19 U/L      Alkaline Phosphatase 47 U/L      Total Protein 6 7 g/dL      Albumin 4 0 g/dL      Total Bilirubin 0 60 mg/dL      eGFR 57 ml/min/1 73sq m     Narrative:      [place] guidelines for Chronic Kidney Disease (CKD):     Stage 1 with normal or high GFR (GFR > 90 mL/min/1 73 square meters)    Stage 2 Mild CKD (GFR = 60-89 mL/min/1 73 square meters)    Stage 3A Moderate CKD (GFR = 45-59 mL/min/1 73 square meters)    Stage 3B Moderate CKD (GFR = 30-44 mL/min/1 73 square meters)    Stage 4 Severe CKD (GFR = 15-29 mL/min/1 73 square meters)    Stage 5 End Stage CKD (GFR <15 mL/min/1 73 square meters)  Note: GFR calculation is accurate only with a steady state creatinine    Troponin I [[id number]]  (Normal) Collected: 03/24/21 0107    Lab Status: Final result Specimen: Blood from Arm, Right Updated: 03/24/21 0135     Troponin I <0 03 ng/mL     Lactic acid [[id number]]  (Normal) Collected: 03/24/21 0107    Lab Status: Final result Specimen: Blood from Arm, Right Updated: 03/24/21 0135     LACTIC ACID 2 0 mmol/L     Narrative:      Result may be elevated if tourniquet was used during collection      UA w Reflex to Microscopic w Reflex to Culture [[id number]]  (Abnormal) Collected: 03/24/21 0109    Lab Status: Final result Specimen: Urine, Clean Catch Updated: 03/24/21 0128     Color, UA Yellow     Clarity, UA Clear     Specific [place], UA 1 020     pH, UA 7 0     Leukocytes, UA Negative     Nitrite, UA Negative     Protein, UA 1+ mg/dl      Glucose, UA 3+ mg/dl      Ketones, UA 15 (1+) mg/dl      Urobilinogen, UA 0 2 E U /dl      Bilirubin, UA Negative     Blood, UA 2+    Blood gas, Venous [[id number]]  (Abnormal) Collected: 03/24/21 0107    Lab Status: Final result Specimen: Blood from Arm, Right Updated: 03/24/21 0128     pH, Yann 7 390     pCO2, Yann 37 2 mm Hg      pO2, Yann 44 0 mm Hg      HCO3, Yann 22 1 mmol/L      Base Excess, Yann -2 6 mmol/L      O2 Content, Yann 14 7 ml/dL      O2 HGB, VENOUS 72 0 %     CBC and differential [[id number]]  (Abnormal) Collected: 03/24/21 0107    Lab Status: Final result Specimen: Blood from Arm, Right Updated: 03/24/21 0120     WBC 6 80 Thousand/uL      RBC 4 43 Million/uL      Hemoglobin 14 3 g/dL      Hematocrit 40 7 %      MCV 92 fL      MCH 32 4 pg      MCHC 35 2 g/dL      RDW 13 5 %      MPV 6 8 fL      Platelets 102 Thousands/uL     Blood culture #2 [[id number]] Collected: 03/24/21 0107    Lab Status: In process Specimen: Blood from Arm, Right Updated: 03/24/21 0114    Blood culture #1 [[id number]] Collected: 03/24/21 0109    Lab Status: In process Specimen: Blood from Arm, Right Updated: 03/24/21 0114    Procalcitonin with AM Reflex [[id number]] Collected: 03/24/21 0107    Lab Status: In process Specimen: Blood from Arm, Right Updated: 03/24/21 0114                 XR chest portable - 1 view   ED Interpretation by Jenni Avila MD (03/24 0127)     EXAM PERFORMED/VIEWS:  XR CHEST Portable         FINDINGS:     Cardiomediastinal silhouette appears unremarkable      The lungs are clear  No pneumothorax or pleural effusion      Visualized osseous structures appear unremarkable for the patient's age      IMPRESSION:     No focal consolidation, pleural effusion, or pneumothorax                  CTA chest ct abdomen pelvis w contrast    (Results Pending)              Procedures  Procedures         ED Course  ED Course as of Mar 24 0351   Wed Mar 24, 2021   0105 Exam completed    Patient flags for sepsis:  Fever 102, tachycardia  Patient states he had recent diagnosis of UTI -  I suspect urosepsis    Patient understands that he will likely need admission  He is agreeable      0118 Wife at bedside      0123 CBC and differential(!)   0136 Pt describing more SOB, states he feels more tight, like his asthma  Repeat EKG ordered  As well as IV Mg and Duoneb      0139 UA w Reflex to Microscopic w Reflex to Culture(!)   0140 Blood gas, Venous(!)   0140 Troponin I: <0 03   0140 LACTIC ACID: 2 0   0140 Repeat EKG nonspecific      0153 Pt not improved despite duoneb  Bipap ordered     I d/w Pt and wife that if he worsens or does not improve w/ bipap he will need intubation      0158 Magnesium(!): 1 6   0159 o   BNP(!): 165   0159 Comprehensive metabolic panel(!)   6628 Patient is improving on BiPAP    Wife provides more information  Patient has had 2 doses of Bactrim  She states now that she recalls that several years ago when he was on Bactrim and Cipro he had a similar flu-like illness, nothing this bad      0212 Patient feels urgency like he needs to go   3 L of IV fluids are in and only 300 cc of dark urine are produced  Will place Gaspar for monitoring purposes      0218 Patient is optimized and 12/5 BiPAP  Oxygen sats 100%      0218 COVID19, Influenza A/B, RSV PCR, [facility]   9028 Patient is becoming more agitated with BiPAP mask    Being that there was a concern for drug reaction with Bactrim I will give the patient a dose of Benadryl normally treat drug reaction but also to offer some anxiolysis      0223 Temp 106 now  Call to ICU     Ice pack ordered to bilateral axilla, groin, neck      0225 D/w DR Kamran Bush with ICU   Reviewed case  He does not feel that he has any criteria for NMS nor SS or MH  He feels that this could be from aseptic meningitis       0233 Pt does not have any clonis  No nuchal rigidity     Reviewed this w/ Dr Kamran Bush       4488 I d/w Dr Kamran Bush about urgent transfer for ICU level care, as there is no Intensivist to see the pt here  He agrees      56 D/w Bubba Dinh in [address] for priority 1  Pedro is at Capacity  We will try [facility]  ICU at [facility], Dr Girard Brittle the entirety of the case  Accepts the case   Recommends IVF and supportive care at this time      46 I discussed with the wife on multiple occasions how sick her  is  We talked about indications for intubation  There are no definite indications at this time  I do not anticipate patient will  need intubation, though if he worsens he will  Wife understands and consents, should he need airway protection  Pt agrees          0302 Patient insisted on having BiPAP removed  I removed at his request  He is slightly improved from an anxiety standpoint   His respiratory rate is unchanged        0325 [facility] is here for the patient  Patient remains clinically stable  He is protecting his airway        7966 Blood gas, arterial(!)             HEART Risk Score      Most Recent Value   Heart Score Risk Calculator   History  1 Filed at: 03/24/2021 0139   ECG  1 Filed at: 03/24/2021 0139   Age  2 Filed at: 03/24/2021 0139   Risk Factors  2 Filed at: 03/24/2021 0139   Troponin  0 Filed at: 03/24/2021 0139   HEART Score  6 Filed at: 03/24/2021 0139                          Wells' Criteria for PE      Most Recent Value   Wells' Criteria for PE   Clinical signs and symptoms of DVT  3 Filed at: 03/24/2021 0139   PE is primary diagnosis or equally likely  3 Filed at: 03/24/2021 0139   HR >100  1 5 Filed at: 03/24/2021 0139   Immobilization at least 3 days or Surgery in the previous 4 weeks  1 5 Filed at: 03/24/2021 0139   Previous, objectively diagnosed PE or DVT  0 Filed at: 03/24/2021 0139   Hemoptysis  0 Filed at: 03/24/2021 0139   Malignancy with treatment within 6 months or palliative  0 Filed at: 03/24/2021 0139   Wells' Criteria Total  9 Filed at: 03/24/2021 0139 MDM    Disposition  Final diagnoses:   Sepsis ([facility] )   Hyperthermia   Respiratory failure ([facility] [address] )     Time reflects when diagnosis was documented in both MDM as applicable and the Disposition within this note     Time User Action Codes Description Comment    3/24/2021  2:45 AM Heydi Ramirez Add [A41 9] Sepsis ([facility] [address] )     3/24/2021  2:45 AM Heydi Ramirez Add [R50 9] Hyperthermia     3/24/2021  2:45 AM Heydi Ramirez Add [J96 90] Respiratory failure [facility])       ED Disposition     ED Disposition Condition Date/Time Comment    Transfer to Another Facility-In Network  Wed Mar 24, 2021  2:45 AM Adria Vergara should be transferred out to [facility]      MD Documentation      Most Recent Value   Patient Condition  The patient has been stabilized such that within reasonable medical probability, no material deterioration of the patient condition or the condition of the unborn child(avis) is likely to result from the transfer   Reason for Transfer  Level of Care needed not available at this facility   Benefits of Transfer  Specialized equipment and/or services available at the receiving facility (Include comment)________________________   Risks of Transfer  Potential for delay in receiving treatment, Potential deterioration of medical condition, Loss of IV, Increased discomfort during transfer, Possible worsening of condition or death during transfer   Accepting Physician  Dr Devi Montgomery Name, Ricarda Hinds   Provider Certification  General risk, such as traffic hazards, adverse weather conditions, rough terrain or turbulence, possible failure of equipment (including vehicle or aircraft), or consequences of actions of persons outside the control of the transport personnel, Unanticipated needs of medical equipment and personnel during transport, Risk of worsening condition, The possibility of a transport vehicle being unavailable      RN Documentation Most Recent Value   Accepting Facility Name, [facility]   Bed Assignment  ICU 8   Report Given to  LAURO Melendez       Follow-up Information    None         Discharge Medication List as of 3/24/2021  3:39 AM      CONTINUE these medications which have NOT CHANGED    Details   albuterol (VENTOLIN HFA) 90 mcg/act inhaler Ventolin HFA 90 mcg/actuation aerosol inhaler, Historical Med      amLODIPine (NORVASC) 5 mg tablet amlodipine 5 mg tablet, Historical Med      aspirin 81 mg chewable tablet Chew 1 tablet (81 mg total) daily for 30 days, Starting Sun 3/24/2019, Until Tue 4/23/2019, Normal      atenolol (TENORMIN) 25 mg tablet atenolol 25 mg tablet, Historical Med      glipiZIDE (GLUCOTROL XL) 2 5 mg 24 hr tablet glipizide ER 2 5 mg tablet, extended release 24 hr, Historical Med      hydrOXYzine HCL (ATARAX) 25 mg tablet hydroxyzine HCl 25 mg tablet, Historical Med      lisinopril (ZESTRIL) 20 mg tablet lisinopril 20 mg tablet, Historical Med      metFORMIN (GLUCOPHAGE) 500 mg tablet metformin 500 mg tablet, Historical Med      montelukast (SINGULAIR) 10 mg tablet montelukast 10 mg tablet, Historical Med      nitroglycerin (NITROSTAT) 0 4 mg SL tablet Place 1 tablet (0 4 mg total) under the tongue every 5 (five) minutes as needed for chest pain for up to 30 days, Starting Sat 3/23/2019, Until Mon 4/22/2019, Normal      ranitidine (ZANTAC) 300 MG tablet ranitidine 300 mg tablet, Historical Med           No discharge procedures on file      PDMP Review     None          ED Provider  Electronically Signed by           Jenni Avila MD  03/24/21 100 [facility] Gillian Simmonds, MD  03/24/21 0861

## 2021-03-24 NOTE — ED PROCEDURE NOTE
PROCEDURE  ECG 12 Lead Documentation Only    Date/Time: 3/24/2021 12:55 AM  Performed by: Anthony Rehman MD  Authorized by: Anthony Rehman MD     Indications / Diagnosis:  Sepsis   ECG reviewed by me, the ED Provider: yes    Patient location:  ED  Interpretation:     Interpretation: non-specific    Rate:     ECG rate:  114    ECG rate assessment: tachycardic    Rhythm:     Rhythm: sinus tachycardia    QRS:     QRS axis:  Normal    QRS intervals:  Normal  Conduction:     Conduction: normal    ST segments:     ST segments:  Non-specific  T waves:     T waves: non-specific           Anthony Rehman MD  03/24/21 8650

## 2021-03-24 NOTE — ASSESSMENT & PLAN NOTE
· We will hold the patients lisinopril for now given his rising creatinine  · Will continue his outpatient amlodipine and atenolol

## 2021-03-24 NOTE — ED PROCEDURE NOTE
PROCEDURE  CriticalCare Time  Performed by: Tatyana Sosa MD  Authorized by: Tatyana Sosa MD     Critical care provider statement:     Critical care time (minutes):  95    Critical care start time:  3/24/2021 12:34 AM    Critical care end time:  3/24/2021 3:34 AM    Critical care time was exclusive of:  Separately billable procedures and treating other patients    Critical care was necessary to treat or prevent imminent or life-threatening deterioration of the following conditions:  CNS failure or compromise, dehydration, endocrine crisis, metabolic crisis, toxidrome, sepsis and respiratory failure    Critical care was time spent personally by me on the following activities:  Obtaining history from patient or surrogate, development of treatment plan with patient or surrogate, discussions with consultants, discussions with primary provider, evaluation of patient's response to treatment, examination of patient, ventilator management, review of old charts, re-evaluation of patient's condition, ordering and review of radiographic studies, ordering and review of laboratory studies and ordering and performing treatments and interventions         Tatyana Sosa MD  03/24/21 9761

## 2021-03-24 NOTE — ED PROCEDURE NOTE
PROCEDURE  ECG 12 Lead Documentation Only    Date/Time: 3/24/2021 1:43 AM  Performed by: Awilda Huffman MD  Authorized by: Awilda Huffman MD     Indications / Diagnosis:  Shortness of breath, sepsis  ECG reviewed by me, the ED Provider: yes    Patient location:  ED  Previous ECG:     Comparison to cardiac monitor: Yes    Interpretation:     Interpretation: abnormal    Rate:     ECG rate:  133    ECG rate assessment: tachycardic    Rhythm:     Rhythm: sinus tachycardia    Ectopy:     Ectopy: none    QRS:     QRS axis:  Normal    QRS intervals:  Normal  Conduction:     Conduction: normal    ST segments:     ST segments:  Non-specific  T waves:     T waves: non-specific           Awilda Huffman MD  03/24/21 0144

## 2021-03-24 NOTE — ASSESSMENT & PLAN NOTE
· May be related to volume depletion in the setting of his acute illness  · Possible SIADH int he setting of lisinopril use  · Will hydrate gently with normal saline and monitor his sodium level closely

## 2021-03-24 NOTE — ASSESSMENT & PLAN NOTE
· The patients creatine is mildly elevated at 1 24- possibly related to an GERDA in the setting of his acute febrile illness  · We will monitor his urine output and renal indices closely

## 2021-03-24 NOTE — ED PROCEDURE NOTE
PROCEDURE  ECG 12 Lead Documentation Only    Date/Time: 3/24/2021 2:34 AM  Performed by: Jenni Avila MD  Authorized by: Jenni Avila MD     Indications / Diagnosis:  Sepsis, shortness breath, tachycardia  Patient location:  ED  Previous ECG:     Comparison to cardiac monitor: Yes    Interpretation:     Interpretation: abnormal    Rate:     ECG rate:  148    ECG rate assessment: tachycardic    Rhythm:     Rhythm: sinus tachycardia    Ectopy:     Ectopy: none    QRS:     QRS axis:  Normal    QRS intervals:  Normal  Conduction:     Conduction: normal    ST segments:     ST segments:  Non-specific  T waves:     T waves: non-specific           Jenni Avila MD  03/24/21 0669

## 2021-03-24 NOTE — ASSESSMENT & PLAN NOTE
· The patients symptoms were preceded by bactrim administration which has been associated with fever    · In addition, the patient was also on zantac which can predispose to a drug fever  · We will hold these agents for now  · There is currently no sign of a rash to indicate acute febrile neutrophilic dermatosis which can be seen in bactrim administration  · The patient received benadryl, pepcid and steroids at [facility]  · We will continue prn benadryl for now  · We will hold on additional steroids

## 2021-03-24 NOTE — ASSESSMENT & PLAN NOTE
· He was diagnosed with a UTI at his PMD on 3/22 (culture results are not available) and was placed on bactrim   Since then he has developed increased fever, generalized weakness and not feeling well  · His current UA reveals blood, ketones but no bacteria or leukocytes  · It is possible the UTI was partially treated with bactrim  · We will monitor his procalcitonin level

## 2021-03-24 NOTE — ASSESSMENT & PLAN NOTE
Lab Results   Component Value Date    HGBA1C 6 9 (H) 11/04/2020       Recent Labs     03/24/21  0601 03/24/21  0743 03/24/21  1106   POCGLU 132 131 188*       Blood Sugar Average: Last 72 hrs:  · (P) 283 [id number]   · Will place the patient on SSI for now  · Our goal will be to maintain the patients blood glucose between 140 and 180

## 2021-03-24 NOTE — PLAN OF CARE
Problem: Prexisting or High Potential for Compromised Skin Integrity  Goal: Skin integrity is maintained or improved  Description: INTERVENTIONS:  - Identify patients at risk for skin breakdown  - Assess and monitor skin integrity  - Assess and monitor nutrition and hydration status  - Monitor labs   - Assess for incontinence   - Turn and reposition patient  - Assist with mobility/ambulation  - Relieve pressure over bony prominences  - Avoid friction and shearing  - Provide appropriate hygiene as needed including keeping skin clean and dry  - Evaluate need for skin moisturizer/barrier cream  - Collaborate with interdisciplinary team   - Patient/family teaching  - Consider wound care consult   Outcome: Progressing     Problem: Potential for Falls  Goal: Patient will remain free of falls  Description: INTERVENTIONS:  - Assess patient frequently for physical needs  -  Identify cognitive and physical deficits and behaviors that affect risk of falls    -  Isabella fall precautions as indicated by assessment   - Educate patient/family on patient safety including physical limitations  - Instruct patient to call for assistance with activity based on assessment  - Modify environment to reduce risk of injury  - Consider OT/PT consult to assist with strengthening/mobility  Outcome: Progressing     Problem: NEUROSENSORY - ADULT  Goal: Achieves stable or improved neurological status  Description: INTERVENTIONS  - Monitor and report changes in neurological status  - Monitor vital signs such as temperature, blood pressure, glucose, and any other labs ordered   - Initiate measures to prevent increased intracranial pressure  - Monitor for seizure activity and implement precautions if appropriate      Outcome: Progressing     Problem: RESPIRATORY - ADULT  Goal: Achieves optimal ventilation and oxygenation  Description: INTERVENTIONS:  - Assess for changes in respiratory status  - Assess for changes in mentation and behavior  - Position to facilitate oxygenation and minimize respiratory effort  - Oxygen administered by appropriate delivery if ordered  - Initiate smoking cessation education as indicated  - Encourage broncho-pulmonary hygiene including cough, deep breathe, Incentive Spirometry  - Assess the need for suctioning and aspirate as needed  - Assess and instruct to report SOB or any respiratory difficulty  - Respiratory Therapy support as indicated  Outcome: Progressing

## 2021-03-24 NOTE — ASSESSMENT & PLAN NOTE
· He was diagnosed with a UTI at his PMD on 3/22 (culture results are not available) and was placed on bactrim   Since then he has developed increased fever, generalized weakness and not feeling well  · His current UA reveals blood, ketones but no bacteria or leukocytes  · It is possible the UTI was partially treated with bactrim  · We will place him on cefepime for now  · We will monitor his procalcitonin level

## 2021-03-24 NOTE — H&P
[address] [place] [address]  H&P- Gaylord Dandy 1945, 76 y o  male MRN: [id number]  Unit/Bed#: ICU 08 Encounter: [id number]  Primary Care Provider: Alanna Weinstein MD   Date and time admitted to hospital: 3/24/2021  4:14 AM    * Sepsis [facility])  Assessment & Plan  · The patient arrived as a transfer from [facility] after he presented there with a c/o generalized weakness and fever to 107  He will be admitted tot he stepdown unit for evaluation and monitoring  This will likely require greater than a 2 midnight stay therefore he will be placed in inpatient status  · The etiology of his fever may be related to the UTI he was diagnosed with on 3/22  · We will place him on cefepime for now pending his culture results  · Blood cultures are pending  · Will repeat his lactic acid level now and trend  · We will continue with hydration  · We will treat the fever with tylenol and cooling measures    UTI (urinary tract infection)  Assessment & Plan  · He was diagnosed with a UTI at his PMD on 3/22 (culture results are not available) and was placed on bactrim  Since then he has developed increased fever, generalized weakness and not feeling well  · His current UA reveals blood, ketones but no bacteria or leukocytes  · It is possible the UTI was partially treated with bactrim  · We will place him on cefepime for now  · We will monitor his procalcitonin level    Type 2 diabetes mellitus with complication, without long-term current use of insulin (HCC)  Assessment & Plan  Lab Results   Component Value Date    HGBA1C 6 9 (H) 11/04/2020       No results for input(s): POCGLU in the last 72 hours      Blood Sugar Average: Last 72 hrs:  · Will place the patient on SSI for now  · Our goal will be to maintain the patients blood glucose between 140 and 180    Hyponatremia  Assessment & Plan  · May be related to volume depletion in the setting of his acute illness  · Possible SIADH int he setting of lisinopril use  · Will hydrate gently with normal saline and monitor his sodium level closely    GERDA (acute kidney injury) ([facility] [address] )  Assessment & Plan  · The patients creatine is mildly elevated at 1 24- possibly related to an GERDA in the setting of his acute febrile illness  · We will monitor his urine output and renal indices closely    Asthma  Assessment & Plan  · We will continue the patients advair  · Will continue prn neb treatments    Essential hypertension  Assessment & Plan  · We will hold the patients lisinopril for now given his rising creatinine  · Will continue his outpatient amlodipine and tenorman    Drug reaction  Assessment & Plan  · The patients symptoms were preceded by bactrim administration which has been associated with fever  · In addition, the patient was also on zantac which can predispose to a drug fever  · We will hold these agents for now  · There is currently no sign of a rash to indicate acute febrile neutrophilic dermatosis which can be seen in bactrim administration  · The patient received benadryl, pepcid and steroids at [facility]  · We will continue prn benadryl for now  · We will hold on additional steroids    -------------------------------------------------------------------------------------------------------------  Chief Complaint: fever    History of Present Illness   HX and PE limited by: patient is a poor historian  Omkar Harrell is a 76 y o  male who presents as a transfer from [facility] after he presented there with a complaint of weakness and fever  The patient was seen at his PMD on 3/22 for a UTI  At that time he was placed on bactrim  Since taking the bactrim, the patient reports weakness and fever  He denies joint pain, abdominal pain, chest pain or cough  He reports a rash in his groin and back but states this is intermittently there and has been present prior to the bactrim administration   He denies back pain    History obtained from chart review and the patient   -------------------------------------------------------------------------------------------------------------  Dispo: Admit to Stepdown Level 1    Code Status: Level 1 - Full Code  --------------------------------------------------------------------------------------------------------------  Review of Systems    A 12-point, complete review of systems was reviewed and negative except as stated above     Physical Exam  Constitutional:       Appearance: He is ill-appearing  HENT:      Head: Normocephalic  Nose: Nose normal       Mouth/Throat:      Mouth: Mucous membranes are moist    Eyes:      Pupils: Pupils are equal, round, and reactive to light  Comments: Conjunctiva are injected   Neck:      Musculoskeletal: Neck supple  Cardiovascular:      Rate and Rhythm: Tachycardia present  Pulmonary:      Breath sounds: Wheezing present  Abdominal:      General: There is distension  Tenderness: There is no abdominal tenderness  Musculoskeletal:         General: No swelling  Lymphadenopathy:      Cervical: No cervical adenopathy  Skin:     General: Skin is warm and dry  Comments: His face is flushed but there are no other signs of rash on the back, trunk, abdomen, or extremities   Neurological:      General: No focal deficit present  Comments: Slow to respond but follows commands       --------------------------------------------------------------------------------------------------------------  Vitals:   Vitals:    03/24/21 0426 03/24/21 0542   BP: 163/80    Pulse: (!) 132    Resp: (!) 55    Temp: (!) 103 6 °F (39 8 °C)    SpO2: 92% 93%   Weight: 80 2 kg (176 lb 12 9 oz)    Height: 6' (1 829 m)      Temp  Min: 102 6 °F (39 2 °C)  Max: 107 6 °F (42 °C)  IBW: 77 6 kg  Height: 6' (182 9 cm)  Body mass index is 23 98 kg/m²      Laboratory and Diagnostics:  Results from last 7 days   Lab Units 03/24/21  0537 03/24/21  0107   WBC Thousand/uL 6 45 6 80   HEMOGLOBIN g/dL 13 2 14 3 HEMATOCRIT % 39 1 40 7*   PLATELETS Thousands/uL 150 169   MONO PCT %  --  5     Results from last 7 days   Lab Units 03/24/21  0107   SODIUM mmol/L 127*   POTASSIUM mmol/L 4 3   CHLORIDE mmol/L 95*   CO2 mmol/L 23   ANION GAP mmol/L 9   BUN mg/dL 17   CREATININE mg/dL 1 24   CALCIUM mg/dL 9 3   GLUCOSE RANDOM mg/dL 259*   ALT U/L 19   AST U/L 15   ALK PHOS U/L 47*   ALBUMIN g/dL 4 0   TOTAL BILIRUBIN mg/dL 0 60     Results from last 7 days   Lab Units 03/24/21  0107   MAGNESIUM mg/dL 1 6*      Results from last 7 days   Lab Units 03/24/21  0107   INR  1 16   PTT seconds 28      Results from last 7 days   Lab Units 03/24/21  0107   TROPONIN I ng/mL <0 03     Results from last 7 days   Lab Units 03/24/21  0107   LACTIC ACID mmol/L 2 0     ABG:  Results from last 7 days   Lab Units 03/24/21  0315   PH ART  7 330*   PCO2 ART mm Hg 34 3*   PO2 ART mm Hg 367 0*   HCO3 ART mmol/L 17 8*   BASE EXC ART mmol/L -6 8*   ABG SOURCE  Radial, Right     VBG:  Results from last 7 days   Lab Units 03/24/21  0315 03/24/21 0107   PH ERIN   --  7 390   PCO2 ERIN mm Hg  --  37 2   PO2 ERIN mm Hg  --  44 0   HCO3 ERIN mmol/L  --  22 1*   BASE EXC ERIN mmol/L  --  -2 6   ABG SOURCE  Radial, Right  --            Micro:        EKG: ST  Imaging: I have personally reviewed pertinent reports     and I have personally reviewed pertinent films in PACS      Historical Information   Past Medical History:   Diagnosis Date    Asthma     Diabetes mellitus ([facility] [address] )     Environmental allergies     Hyperlipidemia     Hypertension     Orthostatic hypotension     Osteopenia     Sinusitis      Past Surgical History:   Procedure Laterality Date    COLONOSCOPY      KNEE ARTHROPLASTY      VASECTOMY       Social History   Social History     Substance and Sexual Activity   Alcohol Use Never    Frequency: Never     Social History     Substance and Sexual Activity   Drug Use Never     Social History     Tobacco Use   Smoking Status Never Smoker   Smokeless Tobacco Never Used     Exercise History:   Family History:   Family History   Problem Relation Age of Onset    Asthma Mother     Cancer Father     Asthma Brother     Cancer Brother      Family history unknown and I have reviewed this patient's family history and commented on sigificant items within the HPI      Medications:  Current Facility-Administered Medications   Medication Dose Route Frequency    albuterol (PROVENTIL HFA,VENTOLIN HFA) inhaler 2 puff  2 puff Inhalation Q4H PRN    amLODIPine (NORVASC) tablet 5 mg  5 mg Oral Daily    aspirin chewable tablet 81 mg  81 mg Oral Daily    atenolol (TENORMIN) tablet 25 mg  25 mg Oral Daily    cefepime (MAXIPIME) 1,000 mg in dextrose 5 % 50 mL IVPB  1,000 mg Intravenous Q12H    chlorhexidine (PERIDEX) 0 12 % oral rinse 15 mL  15 mL Swish & Spit Q12H [facility]    heparin (porcine) subcutaneous injection 5,000 Units  5,000 Units Subcutaneous Q8H [facility]    lactated ringers infusion  100 mL/hr Intravenous Continuous    levalbuterol (XOPENEX) inhalation solution 1 25 mg  1 25 mg Nebulization Q8H PRN    levalbuterol (XOPENEX) inhalation solution 1 25 mg  1 25 mg Nebulization Q4H PRN    montelukast (SINGULAIR) tablet 10 mg  10 mg Oral Daily    sodium chloride 0 9 % inhalation solution 3 mL  3 mL Nebulization Q8H PRN     Home medications:  Prior to Admission Medications   Prescriptions Last Dose Informant Patient Reported? Taking?    albuterol (VENTOLIN HFA) 90 mcg/act inhaler   Yes No   Sig: Ventolin HFA 90 mcg/actuation aerosol inhaler   amLODIPine (NORVASC) 5 mg tablet   Yes No   Sig: amlodipine 5 mg tablet   aspirin 81 mg chewable tablet   No No   Sig: Chew 1 tablet (81 mg total) daily for 30 days   atenolol (TENORMIN) 25 mg tablet   Yes No   Sig: atenolol 25 mg tablet   glipiZIDE (GLUCOTROL XL) 2 5 mg 24 hr tablet   Yes No   Sig: glipizide ER 2 5 mg tablet, extended release 24 hr   hydrOXYzine HCL (ATARAX) 25 mg tablet   Yes No   Sig: hydroxyzine HCl 25 mg tablet lisinopril (ZESTRIL) 20 mg tablet   Yes No   Sig: lisinopril 20 mg tablet   metFORMIN (GLUCOPHAGE) 500 mg tablet   Yes No   Sig: metformin 500 mg tablet   montelukast (SINGULAIR) 10 mg tablet   Yes No   Sig: montelukast 10 mg tablet   nitroglycerin (NITROSTAT) 0 4 mg SL tablet   No No   Sig: Place 1 tablet (0 4 mg total) under the tongue every 5 (five) minutes as needed for chest pain for up to 30 days   ranitidine (ZANTAC) 300 MG tablet   Yes No   Sig: ranitidine 300 mg tablet      Facility-Administered Medications: None     Allergies:  No Known Allergies    ------------------------------------------------------------------------------------------------------------  Advance Directive and Living Will:      Power of :    POLST:    ------------------------------------------------------------------------------------------------------------  Anticipated Length of Stay is > 2 midnights    Care Time Delivered:   Upon my evaluation, this patient had a high probability of imminent or life-threatening deterioration due to Sepsis, which required my direct attention, intervention, and personal management  I have personally provided 40 minutes (0500 to [phone]) of critical care time, exclusive of procedures, teaching, family meetings, and any prior time recorded by providers other than myself  BELGICA Jordan        Portions of the record may have been created with voice recognition software  Occasional wrong word or "sound a like" substitutions may have occurred due to the inherent limitations of voice recognition software    Read the chart carefully and recognize, using context, where substitutions have occurred

## 2021-03-24 NOTE — ASSESSMENT & PLAN NOTE
Lab Results   Component Value Date    HGBA1C 6 9 (H) 11/04/2020       No results for input(s): POCGLU in the last 72 hours      Blood Sugar Average: Last 72 hrs:  · Will place the patient on SSI for now  · Our goal will be to maintain the patients blood glucose between 140 and 180

## 2021-03-25 LAB
ALBUMIN SERPL BCP-MCNC: 2.6 G/DL (ref 3.5–5)
ALP SERPL-CCNC: 50 U/L (ref 46–116)
ALT SERPL W P-5'-P-CCNC: 34 U/L (ref 12–78)
ANION GAP SERPL CALCULATED.3IONS-SCNC: 7 MMOL/L (ref 4–13)
AST SERPL W P-5'-P-CCNC: 35 U/L (ref 5–45)
BACTERIA UR CULT: NORMAL
BILIRUB SERPL-MCNC: 0.42 MG/DL (ref 0.2–1)
BUN SERPL-MCNC: 16 MG/DL (ref 5–25)
CALCIUM ALBUM COR SERPL-MCNC: 9.7 MG/DL (ref 8.3–10.1)
CALCIUM SERPL-MCNC: 8.6 MG/DL (ref 8.3–10.1)
CHLORIDE SERPL-SCNC: 104 MMOL/L (ref 100–108)
CO2 SERPL-SCNC: 24 MMOL/L (ref 21–32)
CREAT SERPL-MCNC: 1.17 MG/DL (ref 0.6–1.3)
ERYTHROCYTE [DISTWIDTH] IN BLOOD BY AUTOMATED COUNT: 13 % (ref 11.6–15.1)
GFR SERPL CREATININE-BSD FRML MDRD: 61 ML/MIN/1.73SQ M
GLUCOSE SERPL-MCNC: 118 MG/DL (ref 65–140)
GLUCOSE SERPL-MCNC: 142 MG/DL (ref 65–140)
GLUCOSE SERPL-MCNC: 157 MG/DL (ref 65–140)
GLUCOSE SERPL-MCNC: 161 MG/DL (ref 65–140)
GLUCOSE SERPL-MCNC: 211 MG/DL (ref 65–140)
HCT VFR BLD AUTO: 35.8 % (ref 36.5–49.3)
HGB BLD-MCNC: 12.2 G/DL (ref 12–17)
MCH RBC QN AUTO: 32.2 PG (ref 26.8–34.3)
MCHC RBC AUTO-ENTMCNC: 34.1 G/DL (ref 31.4–37.4)
MCV RBC AUTO: 95 FL (ref 82–98)
PLATELET # BLD AUTO: 132 THOUSANDS/UL (ref 149–390)
PMV BLD AUTO: 9.3 FL (ref 8.9–12.7)
POTASSIUM SERPL-SCNC: 4.4 MMOL/L (ref 3.5–5.3)
PROCALCITONIN SERPL-MCNC: 3.58 NG/ML
PROT SERPL-MCNC: 5.9 G/DL (ref 6.4–8.2)
RBC # BLD AUTO: 3.79 MILLION/UL (ref 3.88–5.62)
SODIUM SERPL-SCNC: 135 MMOL/L (ref 136–145)
WBC # BLD AUTO: 4.53 THOUSAND/UL (ref 4.31–10.16)

## 2021-03-25 PROCEDURE — 85027 COMPLETE CBC AUTOMATED: CPT | Performed by: NURSE PRACTITIONER

## 2021-03-25 PROCEDURE — 84145 PROCALCITONIN (PCT): CPT | Performed by: NURSE PRACTITIONER

## 2021-03-25 PROCEDURE — 99232 SBSQ HOSP IP/OBS MODERATE 35: CPT | Performed by: INTERNAL MEDICINE

## 2021-03-25 PROCEDURE — 82948 REAGENT STRIP/BLOOD GLUCOSE: CPT

## 2021-03-25 PROCEDURE — 80053 COMPREHEN METABOLIC PANEL: CPT | Performed by: NURSE PRACTITIONER

## 2021-03-25 RX ORDER — FLUTICASONE FUROATE AND VILANTEROL 100; 25 UG/1; UG/1
1 POWDER RESPIRATORY (INHALATION) DAILY
Status: DISCONTINUED | OUTPATIENT
Start: 2021-03-25 | End: 2021-03-26 | Stop reason: HOSPADM

## 2021-03-25 RX ORDER — MAGNESIUM HYDROXIDE/ALUMINUM HYDROXICE/SIMETHICONE 120; 1200; 1200 MG/30ML; MG/30ML; MG/30ML
30 SUSPENSION ORAL EVERY 4 HOURS PRN
Status: DISCONTINUED | OUTPATIENT
Start: 2021-03-25 | End: 2021-03-26 | Stop reason: HOSPADM

## 2021-03-25 RX ORDER — PANTOPRAZOLE SODIUM 40 MG/1
40 TABLET, DELAYED RELEASE ORAL
Status: DISCONTINUED | OUTPATIENT
Start: 2021-03-25 | End: 2021-03-26 | Stop reason: HOSPADM

## 2021-03-25 RX ADMIN — ACETAMINOPHEN 975 MG: 325 TABLET, FILM COATED ORAL at 16:41

## 2021-03-25 RX ADMIN — INSULIN LISPRO 1 UNITS: 100 INJECTION, SOLUTION INTRAVENOUS; SUBCUTANEOUS at 08:23

## 2021-03-25 RX ADMIN — ASPIRIN 81 MG: 81 TABLET, CHEWABLE ORAL at 08:24

## 2021-03-25 RX ADMIN — AMLODIPINE BESYLATE 5 MG: 5 TABLET ORAL at 08:24

## 2021-03-25 RX ADMIN — MONTELUKAST SODIUM 10 MG: 10 TABLET, COATED ORAL at 08:24

## 2021-03-25 RX ADMIN — ALBUTEROL SULFATE 2 PUFF: 90 AEROSOL, METERED RESPIRATORY (INHALATION) at 01:21

## 2021-03-25 RX ADMIN — INSULIN LISPRO 1 UNITS: 100 INJECTION, SOLUTION INTRAVENOUS; SUBCUTANEOUS at 16:43

## 2021-03-25 RX ADMIN — HEPARIN SODIUM 5000 UNITS: 5000 INJECTION INTRAVENOUS; SUBCUTANEOUS at 21:20

## 2021-03-25 RX ADMIN — CEFTRIAXONE SODIUM 1000 MG: 10 INJECTION, POWDER, FOR SOLUTION INTRAVENOUS at 16:43

## 2021-03-25 RX ADMIN — ALUMINUM HYDROXIDE, MAGNESIUM HYDROXIDE, AND SIMETHICONE 30 ML: 200; 200; 20 SUSPENSION ORAL at 14:57

## 2021-03-25 RX ADMIN — ATENOLOL 25 MG: 25 TABLET ORAL at 08:24

## 2021-03-25 RX ADMIN — HEPARIN SODIUM 5000 UNITS: 5000 INJECTION INTRAVENOUS; SUBCUTANEOUS at 14:35

## 2021-03-25 RX ADMIN — ACETAMINOPHEN 975 MG: 325 TABLET, FILM COATED ORAL at 01:24

## 2021-03-25 RX ADMIN — PANTOPRAZOLE SODIUM 40 MG: 40 TABLET, DELAYED RELEASE ORAL at 12:16

## 2021-03-25 RX ADMIN — HEPARIN SODIUM 5000 UNITS: 5000 INJECTION INTRAVENOUS; SUBCUTANEOUS at 06:01

## 2021-03-25 RX ADMIN — FLUTICASONE FUROATE AND VILANTEROL TRIFENATATE 1 PUFF: 100; 25 POWDER RESPIRATORY (INHALATION) at 08:25

## 2021-03-25 NOTE — PROGRESS NOTES
Pastoral Care Progress Note    3/25/2021  Patient: Adria Vergara : 1945  Admission Date & Time: 3/24/2021 0414  MRN: [id number] [facility]: [id number]                     Chaplaincy Interventions Utilized:   Empowerment: Encouraged self-care    Exploration: Explored emotional needs & resources    Collaboration: Encouraged adherence to treatment plan    Relationship Building: Cultivated a relationship of care and support and Listened empathically    Chaplaincy Outcomes Achieved:  Expressed gratitude      Spiritual Coping Strategies Utilized:   Spiritual gratitude

## 2021-03-25 NOTE — SOCIAL WORK
CM met with the patient at bedside to do a general SW assessment  The patient is LOS day 1  He is not a bundle  He is not a re-admission  His HRR scor is 11 in the GREEN zone  The patient [place], full flight of steps to enter  He lives with his wife  He is independent with adls and ambulation  He drives  No hx of VNA  No hx of STR  He uses [facility] for rx needs  His PCP is Dr Mary Maradiaga  He has no formal POA, if competency were lost, his healthcare representative would be his wife, Honey Todd  No D&A use  No MH history  No needs identified

## 2021-03-25 NOTE — PLAN OF CARE

## 2021-03-25 NOTE — PROGRESS NOTES
03/25/21 1100   Clinical Encounter Type   Visited With Patient   Routine Visit Introduction   Continue Visiting Yes

## 2021-03-25 NOTE — PLAN OF CARE

## 2021-03-25 NOTE — UTILIZATION REVIEW
Initial Clinical Review    Transfer from   [facility]    Admission: Date/Time/Statement:   Admission Orders (From admission, onward)     Ordered        03/24/21 0444  Inpatient Admission  Once                   Orders Placed This Encounter   Procedures    Inpatient Admission     Standing Status:   Standing     Number of Occurrences:   1     Order Specific Question:   Level of Care     Answer:   Level 1 Stepdown [13]     Order Specific Question:   Estimated length of stay     Answer:   More than 2 Midnights     Order Specific Question:   Certification     Answer:   I certify that inpatient services are medically necessary for this patient for a duration of greater than two midnights  See H&P and MD Progress Notes for additional information about the patient's course of treatment  Assessment/Plan:    76  Y O  Male initially presented  To ED at  [facility] with weakness and fever  Saw PCP  On  3/22 for a   UTI and started om po antibiotics  Since taking bactrim, noted fevers and weakness  Does note a rash on back and groin, however, has  Been there intermittently and prior to bactrim  Has also been on  Zantac which can predisposed  To a  Drug fever  PMH  Is essential hypertension,  DM2, asthma  Labs  Reveal  Elevated creatinine, U/a with blood and ketones, no bacteria or leukocytes, and low sodium  Meets sepsis criteria with temp, tachycardia, elevated lactic acid  Etiology of  Temps may be  UTI  Transferred to [facility] for further care  Admit  Ip Stepdown LOC  With  Sepsis,  No obvious  Source,    UTI,  GERDA and hyponatremia and plan is  Monitor labs, blood cultures,   TRISTAN, IVF, monitor urine output, hold  Lisinopril for now, hold steroids for now and PRN benadryl          Additional Vital Signs:   03/24/21 2131  99 5 °F (37 5 °C)  82  23Abnormal   107/54  72  99 %  --  --  None (Room air)  --   03/24/21 1900  102 8 °F (39 3 °C)Abnormal   --  --  --  --  --  --  --  --  -- 03/24/21 1800  --  88  22  127/69  87  95 %  --  --  --  --   03/24/21 1700  --  88  23Abnormal   129/73  88  95 %  --  --  --  --   03/24/21 1600  --  88  24Abnormal   107/60  76  95 %  --  --  --  --   03/24/21 1500  97 °F (36 1 °C)Abnormal   86  22  115/60  84  97 %  --  --  --  --   03/24/21 1400  --  84  25Abnormal   109/63  80  98 %  --  --  --  --   03/24/21 1255  --  78  20  98/54  76  95 %  --  --  None (Room air)  --   03/24/21 1230  97 6 °F (36 4 °C)  --  --  --  --  --  --  --  --  --   03/24/21 1155  --  80  21  102/67  93  95 %  --  --  --  --   03/24/21 1120  99 3 °F (37 4 °C)  78  18  90/56  67  95 %  --  --  --  --   03/24/21 0855  99 9 °F (37 7 °C)  102  21  113/74  86  95 %  --  --  --  --   03/24/21 0755  103 2 °F (39 6 °C)Abnormal   106Abnormal   23Abnormal   115/67  79  96 %  --  --  --  --   03/24/21 0718  100 °F (37 8 °C)  --  --  --  --  --  --  --  --  --   03/24/21 0700  103 7 °F (39 8 °C)Abnormal   132Abnormal   35Abnormal   182/89Abnormal   124  92 %  --  --  --  --   03/24/21 0542  --  --  --  --  --  93 %  32  3 L/min  Nasal cannula  --   03/24/21 0521  103 6 °F (39 8 °C)Abnormal   110Abnormal   28Abnormal   129/55  70  94 %  --  --  --  --   03/24/21 0426  103 6 °F (39 8 °C)Abnormal   132Abnormal   55Abnormal   163/80  115  92 %  --  --  Nasal cannula         Pertinent Labs/Diagnostic Test Results:   Ct  Chest/abd/pelvis  ( 3/24)    Diffusely thickened urinary bladder wall containing Gaspar catheter in keeping with provided history of UTI  2  There is mild fullness of the left kidney collecting system, without evidence to suggest obstructive uropathy   This is a nonspecific finding but could be related to left-sided pyelonephritis  [place] is no calculus or perinephric collection   3  Prostatomegaly   4  Pulmonary emphysema   5  3 mm right upper lobe pulmonary nodule   Based on current Fleischner Society 2017 Guidelines on incidental pulmonary nodule, no routine follow-up is needed if the patient is considered low risk for lung cancer   If the patient is considered high risk   for lung cancer, 12 month follow-up non-contrast chest CT is recommended    Ct  Head  ( 3/24)    No acute intracranial abnormality        Mild cerebral chronic microangiopathic disease  Intracranial carotid and vertebral artery atherosclerotic calcifications        CXR ( 3/24)   NAD  EKG    ( 3 24)   Sinus tachycardia      HR  133      Normal QRS      Non specific T waves  Results from last 7 days   Lab Units 03/24/21  0107   SARS-COV-2  Negative     Results from last 7 days   Lab Units 03/25/21  0608 03/24/21  0537 03/24/21  0107   WBC Thousand/uL 4 53 6 45 6 80   HEMOGLOBIN g/dL 12 2 13 2 14 3   HEMATOCRIT % 35 8* 39 1 40 7*   PLATELETS Thousands/uL 132* 150 169   NEUTROS ABS Thousands/µL  --  5 95  --    TOTAL NEUT ABS Thousand/uL  --   --  6 32         Results from last 7 days   Lab Units 03/25/21  0608 03/24/21  0537 03/24/21  0107   SODIUM mmol/L 135* 134* 127*   POTASSIUM mmol/L 4 4 4 4 4 3   CHLORIDE mmol/L 104 103 95*   CO2 mmol/L 24 20* 23   ANION GAP mmol/L 7 11 9   BUN mg/dL 16 13 17   CREATININE mg/dL 1 17 1 17 1 24   EGFR ml/min/1 73sq m 61 61 57   CALCIUM mg/dL 8 6 8 3 9 3   CALCIUM, IONIZED mmol/L  --  1 03*  --    MAGNESIUM mg/dL  --  2 1 1 6*     Results from last 7 days   Lab Units 03/25/21  0608 03/24/21  0537 03/24/21  0107   AST U/L 35 19 15   ALT U/L 34 24 19   ALK PHOS U/L 50 56 47*   TOTAL PROTEIN g/dL 5 9* 6 3* 6 7   ALBUMIN g/dL 2 6* 3 0* 4 0   TOTAL BILIRUBIN mg/dL 0 42 0 52 0 60     Results from last 7 days   Lab Units 03/25/21  0742 03/24/21  2204 03/24/21  1607 03/24/21  1106 03/24/21  0743 03/24/21  0601   POC GLUCOSE mg/dl 161* 146* 186* 188* 131 132     Results from last 7 days   Lab Units 03/25/21  0608 03/24/21  0537 03/24/21  0107   GLUCOSE RANDOM mg/dL 118 148* 259*      Results from last 7 days   Lab Units 03/24/21  0315   PH ART  7 330*   PCO2 ART mm Hg 34 3*   PO2 ART mm Hg 367 0*   HCO3 ART mmol/L 17 8*   BASE EXC ART mmol/L -6 8*   O2 CONTENT ART mL/dL 19 4   O2 HGB, ARTERIAL % 95 5   ABG SOURCE  Radial, Right     Results from last 7 days   Lab Units 03/24/21  0107   PH ERIN  7 390   PCO2 ERIN mm Hg 37 2   PO2 ERIN mm Hg 44 0   HCO3 ERIN mmol/L 22 1*   BASE EXC ERIN mmol/L -2 6   O2 CONTENT ERIN ml/dL 14 7   O2 HGB, VENOUS % 72 0         Results from last 7 days   Lab Units 03/24/21  0107   CK TOTAL U/L 71     Results from last 7 days   Lab Units 03/24/21  0107   TROPONIN I ng/mL <0 03         Results from last 7 days   Lab Units 03/24/21  0107   PROTIME seconds 14 7*   INR  1 16   PTT seconds 28     Results from last 7 days   Lab Units 03/24/21  0537   TSH 3RD GENERATON uIU/mL 0 637     Results from last 7 days   Lab Units 03/24/21  0537 03/24/21  0107   PROCALCITONIN ng/ml 2 32* 1 33*     Results from last 7 days   Lab Units 03/24/21  0900 03/24/21  0537 03/24/21  0107   LACTIC ACID mmol/L 2 5* 2 5* 2 0             Results from last 7 days   Lab Units 03/24/21  0107   BNP pg/mL 165*             Results from last 7 days   Lab Units 03/24/21  0107   LIPASE u/L <10*     Results from last 7 days   Lab Units 03/24/21  0537   CRP mg/L 197 6*         Results from last 7 days   Lab Units 03/24/21  0109   CLARITY UA  Clear   COLOR UA  Yellow   SPEC GRAV UA  1 020   PH UA  7 0   GLUCOSE UA mg/dl 3+*   KETONES UA mg/dl 15 (1+)*   BLOOD UA  2+*   PROTEIN UA mg/dl 1+*   NITRITE UA  Negative   BILIRUBIN UA  Negative   UROBILINOGEN UA E U /dl 0 2   LEUKOCYTES UA  Negative   WBC UA /hpf None Seen   RBC UA /hpf 10-20*   BACTERIA UA /hpf None Seen   EPITHELIAL CELLS WET PREP /hpf Occasional   MUCUS THREADS  Occasional*     Results from last 7 days   Lab Units 03/24/21  0107   INFLUENZA A PCR  Negative   INFLUENZA B PCR  Negative   RSV PCR  Negative           Results from last 7 days   Lab Units 03/24/21  0109 03/24/21  0107   BLOOD CULTURE  No Growth at 24 hrs  No Growth at 24 hrs       Results from last 7 days   Lab Units 03/24/21  0107   TOTAL COUNTED  100         Present on Admission:   Type 2 diabetes mellitus with complication, without long-term current use of insulin (HCC)   Essential hypertension   Asthma      Admitting Diagnosis: Sepsis ([facility] ) [A41 9]  Age/Sex: 76 y o  male  Admission Orders:  Scheduled Medications:  amLODIPine, 5 mg, Oral, Daily  aspirin, 81 mg, Oral, Daily  atenolol, 25 mg, Oral, Daily  cefTRIAXone, 1,000 mg, Intravenous, Q24H  fluticasone-vilanterol, 1 puff, Inhalation, Daily  heparin (porcine), 5,000 Units, Subcutaneous, Q8H SANDRA  insulin lispro, 1-5 Units, Subcutaneous, TID AC  montelukast, 10 mg, Oral, Daily      Continuous IV Infusions:     PRN Meds:  acetaminophen, 975 mg, Oral, Q6H PRN  albuterol, 2 puff, Inhalation, Q4H PRN  levalbuterol, 1 25 mg, Nebulization, Q8H PRN  sodium chloride, 3 mL, Nebulization, Q8H PRN        IP CONSULT TO CASE MANAGEMENT     Fall precautions  Neuro checks  Q 2 hrs  Dysphagia  eval    Network Utilization Review Department  ATTENTION: Please call with any questions or concerns to [phone] and carefully listen to the prompts so that you are directed to the right person  All voicemails are confidential   Emilie Cohen all requests for admission clinical reviews, approved or denied determinations and any other requests to dedicated fax number below belonging to the campus where the patient is receiving treatment   List of dedicated fax numbers for the Facilities:  [address] DENIALS (Administrative/Medical Necessity) [phone]   [address] (Maternity/NICU/Pediatrics) 261 [facility] [facility] [address] [place] [address] (Page Lawman) [facility] [address] [address] Paola Gonzlaes 1481 [phone]   [address] 951 [phone]

## 2021-03-25 NOTE — UTILIZATION REVIEW
Notification of Inpatient Admission/Inpatient Authorization Request   This is a Notification of Inpatient Admission for Dwain Pope  Be advised that this patient was admitted to our facility under Inpatient Status  Contact Macie Zimmerman at [phone] for additional admission information  [facility] DEPT  DEDICATED [phone]  Patient Name:   Lawence Fuelling   YOB: 1945       [address]   [address]:   [address]   Tax ID: [id number]  NPI: [id number] Attending Provider/NPI:  Phone:  Address: Linda Ratliff [[id number]]  [phone]  Same as the facility   Place of Service Code: 24 Place of Service Name:  [address] [place] [address]   Start Date: 3/24/21 0414 Discharge Date & Time: No discharge date for patient encounter  Type of Admission: Inpatient Status Discharge Disposition   (if discharged): Discharge/Transfer to not defined Healthcare Facility   Patient Diagnoses: Sepsis [facility]) [A41 9]     Orders: Admission Orders (From admission, onward)     Ordered        03/24/21 0444  Inpatient Admission  Once                    Assigned Utilization Review Contact: Dahlia Rivera  Utilization   Network Utilization Review Department  Phone: [phone]; Fax [phone]  Email: [email or web address]   ATTENTION PAYERS: Please call the assigned Utilization  directly with any questions or concerns ALL voicemails in the department are confidential  Send all requests for admission clinical reviews, approved or denied determinations and any other requests to dedicated fax number belonging to the campus where the patient is receiving treatment

## 2021-03-25 NOTE — INCIDENTAL FINDINGS
The following findings require follow up:  Radiographic finding   Finding: 3mm rul pulmonary nodule   Follow up required: ct chest   Follow up should be done within  12months    Please notify the following clinician to assist with the follow up:   Dr Steven De León

## 2021-03-25 NOTE — PROGRESS NOTES
[facility] Progress Note  Patient: Adria Vergara 76 y o  male   MRN: [id number]  PCP: Nathalie Howard MD  Unit/Bed#: ICU 08 Encounter: [id number]  Date Of Visit: 03/25/21      Assessment/plan  1  Sepsis poa- likely due to uti  Pt is on ceftriaxone  Will change to cefepime if he continues to spike fevers  Urine culture is pending  Blood cultures are negative x24 hours    2  Drug reaction- likely related to bactrim  This has since been d/mik  3  Hyponatremia- due to hypovolemic hyponatremia  Sodium has improved  Will check bmp in am    4  ckd3- baseline creatinine is 0 9-1 0  creatinine was 1 2 on admit  It has improved with fluids to 1 17  will check bmp in am    5  Asthma- no exacerbation  Continue current treatment    6  Htn- sbp stable  Continue current treatment  aceI on hold  dispo- spoke with his wife and updated her  Possibly d/c in 24 to 48 hours depending on temperature curve  Subjective:   Pt seen and examined  Pt states his last fever was this am  He feels that his stomach is a little unsettled  He does take pepcid at home and has not received this while in the hospital  No rashes  No cp no sob no v/d no abd pain  Objective:     Vitals: Blood pressure 124/70, pulse 84, temperature (!) 97 1 °F (36 2 °C), temperature source Temporal, resp  rate 17, height 6' (1 829 m), weight 73 9 kg (162 lb 14 7 oz), SpO2 97 %  ,Body mass index is 22 1 kg/m²  Lab, Imaging and other studies:  Results from last 7 days   Lab Units 03/25/21  0608  03/24/21  0107   WBC Thousand/uL 4 53   < > 6 80   HEMOGLOBIN g/dL 12 2   < > 14 3   HEMATOCRIT % 35 8*   < > 40 7*   PLATELETS Thousands/uL 132*   < > 169   INR   --   --  1 16    < > = values in this interval not displayed       Results from last 7 days   Lab Units 03/25/21  0608   POTASSIUM mmol/L 4 4   CHLORIDE mmol/L 104   CO2 mmol/L 24   BUN mg/dL 16   CREATININE mg/dL 1 17   CALCIUM mg/dL 8 6   ALK PHOS U/L 50   ALT U/L 34   AST U/L 35 Results from last 7 days   Lab Units 03/24/21  0107   CK TOTAL U/L 71   TROPONIN I ng/mL <0 03     Lab Results   Component Value Date    BLOODCX No Growth at 24 hrs  03/24/2021    BLOODCX No Growth at 24 hrs  03/24/2021    URINECX No Growth <1000 cfu/mL 06/14/2016         Ct Chest Abdomen Pelvis Wo Contrast    Result Date: 3/24/2021  Narrative: CT CHEST, ABDOMEN AND PELVIS WITHOUT IV CONTRAST INDICATION:   Pneumonia hyperpyrexia  Sepsis, history of UTI COMPARISON:  No prior CT studies of the chest abdomen or pelvis TECHNIQUE: CT examination of the chest, abdomen and pelvis was performed without intravenous contrast   Axial, sagittal, and coronal 2D reformatted images were created from the source data and submitted for interpretation  Radiation dose length product (DLP) for this visit:  926 mGy-cm   This examination, like all CT scans performed in the [facility], was performed utilizing techniques to minimize radiation dose exposure, including the use of iterative reconstruction and automated exposure control  Enteric contrast was not administered  FINDINGS: CHEST LUNGS:  Pulmonary emphysematous changes are noted  Posterior bibasilar atelectasis present  Posterior right upper lobe pulmonary nodule image 3/42 measures 3 mm  PLEURA:  Trace bilateral effusions  HEART/GREAT VESSELS:  No pericardial effusion  Ectasia of ascending thoracic aorta measuring 4 1 cm  MEDIASTINUM AND ARIANNA:  Shotty mediastinal lymph nodes but no lymph nodes with short axis diameter greater than 1 5 cm CHEST WALL AND LOWER NECK:   Unremarkable  ABDOMEN LIVER/BILIARY TREE:  No specific abnormality seen on this unenhanced study  GALLBLADDER:  Mildly distended but without calcified gallstone, pericholecystic inflammation or biliary ductal dilatation  SPLEEN:  Normal size PANCREAS:  Unremarkable  ADRENAL GLANDS:  Unremarkable  KIDNEYS/URETERS:  No urinary tract calculus or perinephric organized collection    Mild fullness of the left renal pelvis and left ureter  STOMACH AND BOWEL:  Unremarkable  APPENDIX:  No findings to suggest appendicitis  ABDOMINOPELVIC CAVITY:  No ascites  No pneumoperitoneum  No lymphadenopathy  VESSELS:  Diffuse atherosclerotic changes  No AAA PELVIS REPRODUCTIVE ORGANS:  Prostate gland is enlarged, measuring 6 1 x 5 1 cm  URINARY BLADDER:  Urinary bladder contains a Gaspar catheter and demonstrates circumferential wall thickening  ABDOMINAL WALL/INGUINAL REGIONS:  Small bilateral inguinal adipose-containing hernias  OSSEOUS STRUCTURES:  No acute fracture or destructive osseous lesion  Impression: 1  Diffusely thickened urinary bladder wall containing Gaspar catheter in keeping with provided history of UTI  2  There is mild fullness of the left kidney collecting system, without evidence to suggest obstructive uropathy  This is a nonspecific finding but could be related to left-sided pyelonephritis  There is no calculus or perinephric collection 3  Prostatomegaly 4  Pulmonary emphysema 5  3 mm right upper lobe pulmonary nodule  Based on current Fleischner Society 2017 Guidelines on incidental pulmonary nodule, no routine follow-up is needed if the patient is considered low risk for lung cancer  If the patient is considered high risk for lung cancer, 12 month follow-up non-contrast chest CT is recommended   Workstation performed: [id number]     Scheduled Meds:   Current Facility-Administered Medications   Medication Dose Route Frequency Provider Last Rate    acetaminophen  975 mg Oral Q6H PRN [place], CRNP      albuterol  2 puff Inhalation Q4H PRN [place], CRNP      amLODIPine  5 mg Oral Daily [place], CRNP      aspirin  81 mg Oral Daily [place], CRNP      atenolol  25 mg Oral Daily [place], CRNP      cefTRIAXone  1,000 mg Intravenous Q24H [place], CRNP Stopped (03/24/21 3126)   [facility] fluticasone-vilanterol  1 puff Inhalation Daily BELGICA Rao      heparin (porcine) 5,000 Units Subcutaneous [facility] BELGICA Hernandez      insulin lispro  1-5 Units Subcutaneous TID AC BELGICA Hernandez      levalbuterol  1 25 mg Nebulization Q8H PRN BELGICA Hernandez      montelukast  10 mg Oral Daily BELGICA Hernandez      pantoprazole  40 mg Oral Early Morning Mendy Kaurron, DO      sodium chloride  3 mL Nebulization Q8H PRN BELGICA Hernandez       Continuous Infusions:    PRN Meds:   acetaminophen    albuterol    levalbuterol    sodium chloride      Physical exam:  Physical Exam  Constitutional:       Appearance: Normal appearance  HENT:      Head: Normocephalic and atraumatic  Eyes:      Extraocular Movements: Extraocular movements intact  Pupils: Pupils are equal, round, and reactive to light  Cardiovascular:      Rate and Rhythm: Normal rate and regular rhythm  Heart sounds: No murmur  No friction rub  No gallop  Pulmonary:      Effort: Pulmonary effort is normal  No respiratory distress  Breath sounds: Normal breath sounds  No wheezing or rales  Abdominal:      General: Bowel sounds are normal  There is no distension  Palpations: Abdomen is soft  Tenderness: There is no abdominal tenderness  There is no guarding  Musculoskeletal:      Right lower leg: No edema  Left lower leg: No edema  Skin:     General: Skin is warm and dry  Neurological:      Mental Status: He is alert and oriented to person, place, and time         VTE Pharmacologic Prophylaxis: Heparin  VTE Mechanical Prophylaxis: sequential compression device    Counseling / Coordination of Care  Total floor / unit time spent today 20 minutes      Current Length of Stay: 1 day(s)    Current Patient Status: Inpatient     Code Status: Level 1 - Full Code

## 2021-03-26 VITALS
HEART RATE: 80 BPM | WEIGHT: 162.7 LBS | RESPIRATION RATE: 16 BRPM | HEIGHT: 72 IN | DIASTOLIC BLOOD PRESSURE: 77 MMHG | TEMPERATURE: 98.6 F | OXYGEN SATURATION: 95 % | SYSTOLIC BLOOD PRESSURE: 119 MMHG | BODY MASS INDEX: 22.04 KG/M2

## 2021-03-26 PROBLEM — A41.9 SEPSIS (HCC): Status: RESOLVED | Noted: 2021-03-24 | Resolved: 2021-03-26

## 2021-03-26 PROBLEM — N17.9 AKI (ACUTE KIDNEY INJURY) (HCC): Status: RESOLVED | Noted: 2021-03-24 | Resolved: 2021-03-26

## 2021-03-26 PROBLEM — T50.905A DRUG REACTION: Status: RESOLVED | Noted: 2021-03-24 | Resolved: 2021-03-26

## 2021-03-26 LAB
ANION GAP SERPL CALCULATED.3IONS-SCNC: 8 MMOL/L (ref 4–13)
BUN SERPL-MCNC: 11 MG/DL (ref 5–25)
CALCIUM SERPL-MCNC: 8.7 MG/DL (ref 8.3–10.1)
CHLORIDE SERPL-SCNC: 99 MMOL/L (ref 100–108)
CO2 SERPL-SCNC: 26 MMOL/L (ref 21–32)
CREAT SERPL-MCNC: 0.94 MG/DL (ref 0.6–1.3)
ERYTHROCYTE [DISTWIDTH] IN BLOOD BY AUTOMATED COUNT: 12.7 % (ref 11.6–15.1)
GFR SERPL CREATININE-BSD FRML MDRD: 79 ML/MIN/1.73SQ M
GLUCOSE SERPL-MCNC: 147 MG/DL (ref 65–140)
GLUCOSE SERPL-MCNC: 159 MG/DL (ref 65–140)
GLUCOSE SERPL-MCNC: 205 MG/DL (ref 65–140)
HCT VFR BLD AUTO: 35.8 % (ref 36.5–49.3)
HGB BLD-MCNC: 12.4 G/DL (ref 12–17)
MCH RBC QN AUTO: 32.2 PG (ref 26.8–34.3)
MCHC RBC AUTO-ENTMCNC: 34.6 G/DL (ref 31.4–37.4)
MCV RBC AUTO: 93 FL (ref 82–98)
PLATELET # BLD AUTO: 113 THOUSANDS/UL (ref 149–390)
PMV BLD AUTO: 9.5 FL (ref 8.9–12.7)
POTASSIUM SERPL-SCNC: 4.5 MMOL/L (ref 3.5–5.3)
RBC # BLD AUTO: 3.85 MILLION/UL (ref 3.88–5.62)
SODIUM SERPL-SCNC: 133 MMOL/L (ref 136–145)
WBC # BLD AUTO: 4.24 THOUSAND/UL (ref 4.31–10.16)

## 2021-03-26 PROCEDURE — 82948 REAGENT STRIP/BLOOD GLUCOSE: CPT

## 2021-03-26 PROCEDURE — 99239 HOSP IP/OBS DSCHRG MGMT >30: CPT | Performed by: INTERNAL MEDICINE

## 2021-03-26 PROCEDURE — 80048 BASIC METABOLIC PNL TOTAL CA: CPT | Performed by: INTERNAL MEDICINE

## 2021-03-26 PROCEDURE — 85027 COMPLETE CBC AUTOMATED: CPT | Performed by: INTERNAL MEDICINE

## 2021-03-26 RX ORDER — FAMOTIDINE 20 MG/1
20 TABLET, FILM COATED ORAL DAILY
Refills: 0
Start: 2021-03-26

## 2021-03-26 RX ORDER — CEFDINIR 300 MG/1
300 CAPSULE ORAL EVERY 12 HOURS SCHEDULED
Qty: 10 CAPSULE | Refills: 0 | Status: SHIPPED | OUTPATIENT
Start: 2021-03-26 | End: 2021-03-31

## 2021-03-26 RX ADMIN — MONTELUKAST SODIUM 10 MG: 10 TABLET, COATED ORAL at 08:08

## 2021-03-26 RX ADMIN — ATENOLOL 25 MG: 25 TABLET ORAL at 08:08

## 2021-03-26 RX ADMIN — ASPIRIN 81 MG: 81 TABLET, CHEWABLE ORAL at 08:08

## 2021-03-26 RX ADMIN — FLUTICASONE FUROATE AND VILANTEROL TRIFENATATE 1 PUFF: 100; 25 POWDER RESPIRATORY (INHALATION) at 08:07

## 2021-03-26 RX ADMIN — HEPARIN SODIUM 5000 UNITS: 5000 INJECTION INTRAVENOUS; SUBCUTANEOUS at 05:00

## 2021-03-26 RX ADMIN — AMLODIPINE BESYLATE 5 MG: 5 TABLET ORAL at 08:08

## 2021-03-26 RX ADMIN — PANTOPRAZOLE SODIUM 40 MG: 40 TABLET, DELAYED RELEASE ORAL at 05:00

## 2021-03-26 NOTE — DISCHARGE SUMMARY
Discharge Summary - [facility]    Patient Information: Burgess Michael 76 y o  male MRN: [id number]  Unit/Bed#: ICU 08 Encounter: [id number]    Discharging Physician / Practitioner: Mehdi Monet DO  PCP: Ros Mendoza MD  Admission Date: 3/24/2021  Discharge Date: 03/26/21    Disposition:     Home     Reason for Admission: sepsis    Discharge Diagnoses:     Principal Problem (Resolved):    Sepsis ([facility] )  Active Problems:    Type 2 diabetes mellitus with complication, without long-term current use of insulin ([facility] )    Essential hypertension    Asthma    Hyponatremia    Pulmonary nodule  Resolved Problems:    UTI (urinary tract infection)    GERDA (acute kidney injury) ([facility] )    Drug reaction      Consultations During Hospital Stay:  · none    Procedures Performed:     · none    Significant Findings / Test Results:   Ct Chest Abdomen Pelvis Wo Contrast  Result Date: 3/24/2021  Impression: 1  Diffusely thickened urinary bladder wall containing Gaspar catheter in keeping with provided history of UTI  2  There is mild fullness of the left kidney collecting system, without evidence to suggest obstructive uropathy  This is a nonspecific finding but could be related to left-sided pyelonephritis  There is no calculus or perinephric collection 3  Prostatomegaly 4  Pulmonary emphysema 5  3 mm right upper lobe pulmonary nodule  Based on current Fleischner Society 2017 Guidelines on incidental pulmonary nodule, no routine follow-up is needed if the patient is considered low risk for lung cancer  If the patient is considered high risk for lung cancer, 12 month follow-up non-contrast chest CT is recommended  Xr Chest Portable - 1 View  Result Date: 3/24/2021  Impression: No acute cardiopulmonary disease  Ct Head Wo Contrast  Result Date: 3/24/2021  Impression: No acute intracranial abnormality  Mild cerebral chronic microangiopathic disease   Intracranial carotid and vertebral artery atherosclerotic calcifications  Incidental Findings:   · Pulmonary nodule- repeat ct chest in 12 months    Test Results Pending at Discharge (will require follow up):   none     Outpatient Tests Requested:  none    Hospital Course:     Caitie Juarez is a 76 y o  male patient who originally presented to the hospital on 3/24/2021 due to sepsis poa likely due to uti  Pt is on ceftriaxone day 2  Urine culture negative but pt had been on antibiotic prior  Blood cultures are negative  Pt has been afebrile for 24 hours  He will be changed to omnicef to complete course of antibiotics  He was admitted with a fever of 107 in which he had recently started bactrim  His symptoms were due to a drug reaction related to bactrim  Bactrim was dced and pts symptoms have resolved  Pt has ckd3 in which his baseline creatinine is 0 9-1 0  creatinine was 1 2 on admit in which his creatinine has improved to 0 9 ad discharge      Pt was discharged to home  Discharge Day Visit / Exam:     * Please refer to separate progress note for these details *    Discharge instructions/Information to patient and family:   See after visit summary for information provided to patient and family  Provisions for Follow-Up Care:  See after visit summary for information related to follow-up care and any pertinent home health orders  Discharge Statement:  I spent 31 minutes discharging the patient  This time was spent on the day of discharge  I had direct contact with the patient on the day of discharge  Greater than 50% of the total time was spent examining patient, answering all patient questions, arranging and discussing plan of care with patient as well as directly providing post-discharge instructions  Additional time then spent on discharge activities  Discharge Medications:  See after visit summary for reconciled discharge medications provided to patient and family

## 2021-03-26 NOTE — PLAN OF CARE
Problem: Prexisting or High Potential for Compromised Skin Integrity  Goal: Skin integrity is maintained or improved  Description: INTERVENTIONS:  - Identify patients at risk for skin breakdown  - Assess and monitor skin integrity  - Assess and monitor nutrition and hydration status  - Monitor labs   - Assess for incontinence   - Turn and reposition patient  - Assist with mobility/ambulation  - Relieve pressure over bony prominences  - Avoid friction and shearing  - Provide appropriate hygiene as needed including keeping skin clean and dry  - Evaluate need for skin moisturizer/barrier cream  - Collaborate with interdisciplinary team   - Patient/family teaching  - Consider wound care consult   Outcome: Adequate for Discharge     Problem: Potential for Falls  Goal: Patient will remain free of falls  Description: INTERVENTIONS:  - Assess patient frequently for physical needs  -  Identify cognitive and physical deficits and behaviors that affect risk of falls    -  [place] fall precautions as indicated by assessment   - Educate patient/family on patient safety including physical limitations  - Instruct patient to call for assistance with activity based on assessment  - Modify environment to reduce risk of injury  - Consider OT/PT consult to assist with strengthening/mobility  Outcome: Adequate for Discharge     Problem: NEUROSENSORY - ADULT  Goal: Achieves stable or improved neurological status  Description: INTERVENTIONS  - Monitor and report changes in neurological status  - Monitor vital signs such as temperature, blood pressure, glucose, and any other labs ordered   - Initiate measures to prevent increased intracranial pressure  - Monitor for seizure activity and implement precautions if appropriate      Outcome: Adequate for Discharge     Problem: RESPIRATORY - ADULT  Goal: Achieves optimal ventilation and oxygenation  Description: INTERVENTIONS:  - Assess for changes in respiratory status  - Assess for changes in mentation and behavior  - Position to facilitate oxygenation and minimize respiratory effort  - Oxygen administered by appropriate delivery if ordered  - Initiate smoking cessation education as indicated  - Encourage broncho-pulmonary hygiene including cough, deep breathe, Incentive Spirometry  - Assess the need for suctioning and aspirate as needed  - Assess and instruct to report SOB or any respiratory difficulty  - Respiratory Therapy support as indicated  Outcome: Adequate for Discharge     Problem: METABOLIC, FLUID AND ELECTROLYTES - ADULT  Goal: Electrolytes maintained within normal limits  Description: INTERVENTIONS:  - Monitor labs and assess patient for signs and symptoms of electrolyte imbalances  - Administer electrolyte replacement as ordered  - Monitor response to electrolyte replacements, including repeat lab results as appropriate  - Instruct patient on fluid and nutrition as appropriate  Outcome: Adequate for Discharge  Goal: Fluid balance maintained  Description: INTERVENTIONS:  - Monitor labs   - Monitor I/O and WT  - Instruct patient on fluid and nutrition as appropriate  - Assess for signs & symptoms of volume excess or deficit  Outcome: Adequate for Discharge  Goal: Glucose maintained within target range  Description: INTERVENTIONS:  - Monitor Blood Glucose as ordered  - Assess for signs and symptoms of hyperglycemia and hypoglycemia  - Administer ordered medications to maintain glucose within target range  - Assess nutritional intake and initiate nutrition service referral as needed  Outcome: Adequate for Discharge     Problem: CARDIOVASCULAR - ADULT  Goal: Maintains optimal cardiac output and hemodynamic stability  Description: INTERVENTIONS:  - Monitor I/O, vital signs and rhythm  - Monitor for S/S and trends of decreased cardiac output  - Administer and titrate ordered vasoactive medications to optimize hemodynamic stability  - Assess quality of pulses, skin color and temperature  - Assess for signs of decreased coronary artery perfusion  - Instruct patient to report change in severity of symptoms  Outcome: Adequate for Discharge  Goal: Absence of cardiac dysrhythmias or at baseline rhythm  Description: INTERVENTIONS:  - Continuous cardiac monitoring, vital signs, obtain 12 lead EKG if ordered  - Administer antiarrhythmic and heart rate control medications as ordered  - Monitor electrolytes and administer replacement therapy as ordered  Outcome: Adequate for Discharge     Problem: HEMATOLOGIC - ADULT  Goal: Maintains hematologic stability  Description: INTERVENTIONS  - Assess for signs and symptoms of bleeding or hemorrhage  - Monitor labs  - Administer supportive blood products/factors as ordered and appropriate  Outcome: Adequate for Discharge     Problem: MUSCULOSKELETAL - ADULT  Goal: Maintain or return mobility to safest level of function  Description: INTERVENTIONS:  - Assess patient's ability to carry out ADLs; assess patient's baseline for ADL function and identify physical deficits which impact ability to perform ADLs (bathing, care of mouth/teeth, toileting, grooming, dressing, etc )  - Assess/evaluate cause of self-care deficits   - Assess range of motion  - Assess patient's mobility  - Assess patient's need for assistive devices and provide as appropriate  - Encourage maximum independence but intervene and supervise when necessary  - Involve family in performance of ADLs  - Assess for home care needs following discharge   - Consider OT consult to assist with ADL evaluation and planning for discharge  - Provide patient education as appropriate  Outcome: Adequate for Discharge     Problem: PAIN - ADULT  Goal: Verbalizes/displays adequate comfort level or baseline comfort level  Description: Interventions:  - Encourage patient to monitor pain and request assistance  - Assess pain using appropriate pain scale  - Administer analgesics based on type and severity of pain and evaluate response  - Implement non-pharmacological measures as appropriate and evaluate response  - Consider cultural and social influences on pain and pain management  - Notify physician/advanced practitioner if interventions unsuccessful or patient reports new pain  Outcome: Adequate for Discharge     Problem: INFECTION - ADULT  Goal: Absence or prevention of progression during hospitalization  Description: INTERVENTIONS:  - Assess and monitor for signs and symptoms of infection  - Monitor lab/diagnostic results  - Monitor all insertion sites, i e  indwelling lines, tubes, and drains  - Monitor endotracheal if appropriate and nasal secretions for changes in amount and color  - [place] appropriate cooling/warming therapies per order  - Administer medications as ordered  - Instruct and encourage patient and family to use good hand hygiene technique  - Identify and instruct in appropriate isolation precautions for identified infection/condition  Outcome: Adequate for Discharge     Problem: SAFETY ADULT  Goal: Patient will remain free of falls  Description: INTERVENTIONS:  - Assess patient frequently for physical needs  -  Identify cognitive and physical deficits and behaviors that affect risk of falls    -  [place] fall precautions as indicated by assessment   - Educate patient/family on patient safety including physical limitations  - Instruct patient to call for assistance with activity based on assessment  - Modify environment to reduce risk of injury  - Consider OT/PT consult to assist with strengthening/mobility  Outcome: Adequate for Discharge  Goal: Maintain or return to baseline ADL function  Description: INTERVENTIONS:  -  Assess patient's ability to carry out ADLs; assess patient's baseline for ADL function and identify physical deficits which impact ability to perform ADLs (bathing, care of mouth/teeth, toileting, grooming, dressing, etc )  - Assess/evaluate cause of self-care deficits   - Assess range of motion  - Assess patient's mobility; develop plan if impaired  - Assess patient's need for assistive devices and provide as appropriate  - Encourage maximum independence but intervene and supervise when necessary  - Involve family in performance of ADLs  - Assess for home care needs following discharge   - Consider OT consult to assist with ADL evaluation and planning for discharge  - Provide patient education as appropriate  Outcome: Adequate for Discharge  Goal: Maintain or return mobility status to optimal level  Description: INTERVENTIONS:  - Assess patient's baseline mobility status (ambulation, transfers, stairs, etc )    - Identify cognitive and physical deficits and behaviors that affect mobility  - Identify mobility aids required to assist with transfers and/or ambulation (gait belt, sit-to-stand, lift, walker, cane, etc )  - [place] fall precautions as indicated by assessment  - Record patient progress and toleration of activity level on Mobility SBAR; progress patient to next Phase/Stage  - Instruct patient to call for assistance with activity based on assessment  - Consider rehabilitation consult to assist with strengthening/weightbearing, etc   Outcome: Adequate for Discharge     Problem: DISCHARGE PLANNING  Goal: Discharge to home or other facility with appropriate resources  Description: INTERVENTIONS:  - Identify barriers to discharge w/patient and caregiver  - Arrange for needed discharge resources and transportation as appropriate  - Identify discharge learning needs (meds, wound care, etc )  - Arrange for interpretive services to assist at discharge as needed  - Refer to Case Management Department for coordinating discharge planning if the patient needs post-hospital services based on physician/advanced practitioner order or complex needs related to functional status, cognitive ability, or social support system  Outcome: Adequate for Discharge     Problem: Knowledge Deficit  Goal: Patient/family/caregiver demonstrates understanding of disease process, treatment plan, medications, and discharge instructions  Description: Complete learning assessment and assess knowledge base  Interventions:  - Provide teaching at level of understanding  - Provide teaching via preferred learning methods  Outcome: Adequate for Discharge     Problem: Knowledge Deficit  Goal: Patient/family/caregiver demonstrates understanding of disease process, treatment plan, medications, and discharge instructions  Description: Complete learning assessment and assess knowledge base    Interventions:  - Provide teaching at level of understanding  - Provide teaching via preferred learning methods  Outcome: Adequate for Discharge

## 2021-03-26 NOTE — PROGRESS NOTES
[facility] Progress Note  Patient: Joe Danielle 76 y o  male   MRN: [id number]  PCP: Destinee Uribe MD  Unit/Bed#: ICU 08 Encounter: [id number]  Date Of Visit: 03/26/21      Assessment/plan  1  Sepsis poa- likely due to uti  Pt is on ceftriaxone day 2  Urine culture negative but pt had been on antibiotic prior  Blood cultures are negative  Pt has been afebrile for 24 hours       2  Drug reaction- likely related to bactrim  This has since been d/mik       3  Hyponatremia- due to hypovolemic hyponatremia  Sodium has improved  4  ckd3- baseline creatinine is 0 9-1 0  creatinine was 1 2 on admit  It has improved with fluids to 0 9  will check bmp in am     5  Asthma- no exacerbation  Continue current treatment     6  Htn- sbp stable  May restart ace inhibitor which was held for elevated creatinine tomorrow      dispo- updated his wife  Pt to be discharged today    Subjective:   Pt seen and examined  Pt doing well  He said he hadn't slept well in the beds last night but other then that he feels fine  He was able to ambulate to the chair from his bed  No f/c no cp no sob no n/v/d no abd pain  Objective:     Vitals: Blood pressure 137/80, pulse 97, temperature 99 1 °F (37 3 °C), temperature source Temporal, resp  rate 16, height 6' (1 829 m), weight 73 8 kg (162 lb 11 2 oz), SpO2 95 %  ,Body mass index is 22 07 kg/m²  Lab, Imaging and other studies:  Results from last 7 days   Lab Units 03/26/21  0446  03/24/21  0107   WBC Thousand/uL 4 24*   < > 6 80   HEMOGLOBIN g/dL 12 4   < > 14 3   HEMATOCRIT % 35 8*   < > 40 7*   PLATELETS Thousands/uL 113*   < > 169   INR   --   --  1 16    < > = values in this interval not displayed       Results from last 7 days   Lab Units 03/26/21  0446 03/25/21  0608   POTASSIUM mmol/L 4 5 4 4   CHLORIDE mmol/L 99* 104   CO2 mmol/L 26 24   BUN mg/dL 11 16   CREATININE mg/dL 0 94 1 17   CALCIUM mg/dL 8 7 8 6   ALK PHOS U/L  --  50   ALT U/L  --  34   AST U/L  -- 35     Results from last 7 days   Lab Units 03/24/21  0107   CK TOTAL U/L 71   TROPONIN I ng/mL <0 03     Lab Results   Component Value Date    BLOODCX No Growth at 24 hrs  03/24/2021    BLOODCX No Growth at 24 hrs  03/24/2021    URINECX No Growth <1000 cfu/mL 03/24/2021    URINECX No Growth <1000 cfu/mL 06/14/2016     Scheduled Meds:   Current Facility-Administered Medications   Medication Dose Route Frequency Provider Last Rate    acetaminophen  975 mg Oral Q6H PRN [facility], CRNP      albuterol  2 puff Inhalation Q4H PRN [facility], CRNP      aluminum-magnesium hydroxide-simethicone  30 mL Oral Q4H PRN Mendy Ambron, DO      amLODIPine  5 mg Oral Daily [facility], CRNP      aspirin  81 mg Oral Daily [facility], CRNP      atenolol  25 mg Oral Daily [facility], CRNP      cefTRIAXone  1,000 mg Intravenous Q24H [facility], CRNP Stopped (03/25/21 1810)    fluticasone-vilanterol  1 puff Inhalation Daily [facility], CRNP      heparin (porcine)  5,000 Units Subcutaneous [facility], CRNP      insulin lispro  1-5 Units Subcutaneous TID AC [facility], CRNP      levalbuterol  1 25 mg Nebulization Q8H PRN [facility], CRNP      montelukast  10 mg Oral Daily [facility], CRNP      pantoprazole  40 mg Oral Early Morning Mendy Ambron, DO      sodium chloride  3 mL Nebulization Q8H PRN [facility], CRNP       Continuous Infusions:    PRN Meds:   acetaminophen    albuterol    aluminum-magnesium hydroxide-simethicone    levalbuterol    sodium chloride      Physical exam:  Physical Exam  General appearance: alert and oriented, in no acute distress  Head: Normocephalic, without obvious abnormality, atraumatic  Eyes: conjunctivae/corneas clear  PERRL, EOM's intact  Fundi benign    Neck: no adenopathy, no carotid bruit, no JVD, supple, symmetrical, trachea midline and thyroid not enlarged, symmetric, no tenderness/mass/nodules  Lungs: clear to auscultation bilaterally  Heart: regular rate and rhythm, S1, S2 normal, no murmur, click, rub or gallop  Abdomen: soft, non-tender; bowel sounds normal; no masses,  no organomegaly  Extremities: extremities normal, warm and well-perfused; no cyanosis, clubbing, or edema  Neurologic: Mental status: Alert, oriented, thought content appropriate

## 2021-03-26 NOTE — NURSING NOTE
Pt walking in hallway prior to discharge, gait steady, discharge instructions given to pt and wife, and they verbalize understanding of same  All belongings taken with pt home   Pt discharged without problem

## 2021-03-29 LAB
BACTERIA BLD CULT: NORMAL
BACTERIA BLD CULT: NORMAL

## 2021-04-12 ENCOUNTER — TRANSCRIBE ORDERS (OUTPATIENT)
Dept: ADMINISTRATIVE | Facility: HOSPITAL | Age: 76
End: 2021-04-12

## 2021-04-12 DIAGNOSIS — H93.A3 PULSATILE TINNITUS, BILATERAL: Primary | ICD-10-CM

## 2021-04-14 ENCOUNTER — HOSPITAL ENCOUNTER (OUTPATIENT)
Dept: NON INVASIVE DIAGNOSTICS | Facility: HOSPITAL | Age: 76
Discharge: HOME | End: 2021-04-14
Payer: COMMERCIAL

## 2021-04-14 DIAGNOSIS — H93.A3 PULSATILE TINNITUS, BILATERAL: ICD-10-CM

## 2021-04-14 PROCEDURE — 93880 EXTRACRANIAL BILAT STUDY: CPT | Performed by: SURGERY

## 2021-04-14 PROCEDURE — 93880 EXTRACRANIAL BILAT STUDY: CPT

## 2021-04-27 ENCOUNTER — APPOINTMENT (OUTPATIENT)
Dept: LAB | Facility: CLINIC | Age: 76
End: 2021-04-27
Payer: COMMERCIAL

## 2021-04-27 ENCOUNTER — TRANSCRIBE ORDERS (OUTPATIENT)
Dept: LAB | Facility: CLINIC | Age: 76
End: 2021-04-27

## 2021-04-27 DIAGNOSIS — R53.83 FATIGUE, UNSPECIFIED TYPE: ICD-10-CM

## 2021-04-27 DIAGNOSIS — R79.89 LOW TESTOSTERONE: ICD-10-CM

## 2021-04-27 DIAGNOSIS — R53.1 WEAKNESS: ICD-10-CM

## 2021-04-27 DIAGNOSIS — R63.4 WEIGHT LOSS: ICD-10-CM

## 2021-04-27 DIAGNOSIS — E55.9 VITAMIN D DEFICIENCY: ICD-10-CM

## 2021-04-27 DIAGNOSIS — E11.9 TYPE 2 DIABETES MELLITUS WITHOUT COMPLICATION, WITHOUT LONG-TERM CURRENT USE OF INSULIN (HCC): ICD-10-CM

## 2021-04-27 DIAGNOSIS — I10 HYPERTENSION, UNSPECIFIED TYPE: Primary | ICD-10-CM

## 2021-04-27 DIAGNOSIS — Z79.899 ENCOUNTER FOR LONG-TERM (CURRENT) USE OF OTHER MEDICATIONS: ICD-10-CM

## 2021-04-27 DIAGNOSIS — E78.5 HYPERLIPIDEMIA, UNSPECIFIED HYPERLIPIDEMIA TYPE: ICD-10-CM

## 2021-04-27 LAB
25(OH)D3 SERPL-MCNC: 36 NG/ML (ref 30–100)
ALBUMIN SERPL BCP-MCNC: 3.4 G/DL (ref 3.5–5)
ALP SERPL-CCNC: 79 U/L (ref 46–116)
ALT SERPL W P-5'-P-CCNC: 18 U/L (ref 12–78)
ANION GAP SERPL CALCULATED.3IONS-SCNC: 6 MMOL/L (ref 4–13)
AST SERPL W P-5'-P-CCNC: 7 U/L (ref 5–45)
BACTERIA UR QL AUTO: ABNORMAL /HPF
BILIRUB SERPL-MCNC: 0.38 MG/DL (ref 0.2–1)
BILIRUB UR QL STRIP: NEGATIVE
BUN SERPL-MCNC: 15 MG/DL (ref 5–25)
CALCIUM ALBUM COR SERPL-MCNC: 10.7 MG/DL (ref 8.3–10.1)
CALCIUM SERPL-MCNC: 10.2 MG/DL (ref 8.3–10.1)
CAOX CRY URNS QL MICRO: ABNORMAL /HPF
CHLORIDE SERPL-SCNC: 102 MMOL/L (ref 100–108)
CHOLEST SERPL-MCNC: 203 MG/DL (ref 50–200)
CLARITY UR: CLEAR
CO2 SERPL-SCNC: 27 MMOL/L (ref 21–32)
COLOR UR: YELLOW
CREAT SERPL-MCNC: 0.92 MG/DL (ref 0.6–1.3)
CREAT UR-MCNC: 117 MG/DL
ERYTHROCYTE [DISTWIDTH] IN BLOOD BY AUTOMATED COUNT: 13.2 % (ref 11.6–15.1)
ERYTHROCYTE [SEDIMENTATION RATE] IN BLOOD: 41 MM/HOUR (ref 0–19)
EST. AVERAGE GLUCOSE BLD GHB EST-MCNC: 160 MG/DL
GFR SERPL CREATININE-BSD FRML MDRD: 81 ML/MIN/1.73SQ M
GLUCOSE P FAST SERPL-MCNC: 163 MG/DL (ref 65–99)
GLUCOSE UR STRIP-MCNC: ABNORMAL MG/DL
HBA1C MFR BLD: 7.2 %
HCT VFR BLD AUTO: 39.2 % (ref 36.5–49.3)
HDLC SERPL-MCNC: 46 MG/DL
HGB BLD-MCNC: 12.9 G/DL (ref 12–17)
HGB UR QL STRIP.AUTO: NEGATIVE
KETONES UR STRIP-MCNC: NEGATIVE MG/DL
LDLC SERPL CALC-MCNC: 141 MG/DL (ref 0–100)
LEUKOCYTE ESTERASE UR QL STRIP: NEGATIVE
MCH RBC QN AUTO: 31.2 PG (ref 26.8–34.3)
MCHC RBC AUTO-ENTMCNC: 32.9 G/DL (ref 31.4–37.4)
MCV RBC AUTO: 95 FL (ref 82–98)
MICROALBUMIN UR-MCNC: 101 MG/L (ref 0–20)
MICROALBUMIN/CREAT 24H UR: 86 MG/G CREATININE (ref 0–30)
NITRITE UR QL STRIP: NEGATIVE
NON-SQ EPI CELLS URNS QL MICRO: ABNORMAL /HPF
NONHDLC SERPL-MCNC: 157 MG/DL
PH UR STRIP.AUTO: 7 [PH]
PLATELET # BLD AUTO: 321 THOUSANDS/UL (ref 149–390)
PMV BLD AUTO: 9.8 FL (ref 8.9–12.7)
POTASSIUM SERPL-SCNC: 4 MMOL/L (ref 3.5–5.3)
PROT SERPL-MCNC: 7.6 G/DL (ref 6.4–8.2)
PROT UR STRIP-MCNC: ABNORMAL MG/DL
RBC # BLD AUTO: 4.14 MILLION/UL (ref 3.88–5.62)
RBC #/AREA URNS AUTO: ABNORMAL /HPF
SODIUM SERPL-SCNC: 135 MMOL/L (ref 136–145)
SP GR UR STRIP.AUTO: 1.02 (ref 1–1.03)
T4 FREE SERPL-MCNC: 1.02 NG/DL (ref 0.76–1.46)
TESTOST SERPL-MCNC: 435 NG/DL (ref 95–948)
TRIGL SERPL-MCNC: 81 MG/DL
TSH SERPL DL<=0.05 MIU/L-ACNC: 1.98 UIU/ML (ref 0.36–3.74)
UROBILINOGEN UR QL STRIP.AUTO: 1 E.U./DL
WBC # BLD AUTO: 8.32 THOUSAND/UL (ref 4.31–10.16)
WBC #/AREA URNS AUTO: ABNORMAL /HPF

## 2021-04-27 PROCEDURE — 80053 COMPREHEN METABOLIC PANEL: CPT

## 2021-04-27 PROCEDURE — 83036 HEMOGLOBIN GLYCOSYLATED A1C: CPT

## 2021-04-27 PROCEDURE — 82306 VITAMIN D 25 HYDROXY: CPT

## 2021-04-27 PROCEDURE — 85027 COMPLETE CBC AUTOMATED: CPT

## 2021-04-27 PROCEDURE — 84443 ASSAY THYROID STIM HORMONE: CPT

## 2021-04-27 PROCEDURE — 36415 COLL VENOUS BLD VENIPUNCTURE: CPT

## 2021-04-27 PROCEDURE — 81001 URINALYSIS AUTO W/SCOPE: CPT

## 2021-04-27 PROCEDURE — 86618 LYME DISEASE ANTIBODY: CPT

## 2021-04-27 PROCEDURE — 80061 LIPID PANEL: CPT

## 2021-04-27 PROCEDURE — 82570 ASSAY OF URINE CREATININE: CPT

## 2021-04-27 PROCEDURE — 85652 RBC SED RATE AUTOMATED: CPT

## 2021-04-27 PROCEDURE — 82043 UR ALBUMIN QUANTITATIVE: CPT

## 2021-04-27 PROCEDURE — 84403 ASSAY OF TOTAL TESTOSTERONE: CPT

## 2021-04-27 PROCEDURE — 84439 ASSAY OF FREE THYROXINE: CPT

## 2021-04-27 PROCEDURE — 87086 URINE CULTURE/COLONY COUNT: CPT

## 2021-04-28 LAB
B BURGDOR IGG+IGM SER-ACNC: 2
BACTERIA UR CULT: NORMAL

## 2021-05-07 ENCOUNTER — HOSPITAL ENCOUNTER (OUTPATIENT)
Dept: MRI IMAGING | Facility: HOSPITAL | Age: 76
Discharge: HOME | End: 2021-05-07
Attending: OTOLARYNGOLOGY
Payer: COMMERCIAL

## 2021-05-07 DIAGNOSIS — H93.A9: ICD-10-CM

## 2021-05-07 DIAGNOSIS — H90.3 ASYMMETRIC SNHL (SENSORINEURAL HEARING LOSS): ICD-10-CM

## 2021-05-07 PROCEDURE — A9585 GADOBUTROL INJECTION: HCPCS | Performed by: OTOLARYNGOLOGY

## 2021-05-07 PROCEDURE — G1004 CDSM NDSC: HCPCS

## 2021-05-07 PROCEDURE — 70549 MR ANGIOGRAPH NECK W/O&W/DYE: CPT

## 2021-05-07 PROCEDURE — 70553 MRI BRAIN STEM W/O & W/DYE: CPT

## 2021-05-07 RX ADMIN — GADOBUTROL 9 ML: 604.72 INJECTION INTRAVENOUS at 16:38

## 2021-06-16 ENCOUNTER — CONSULT (OUTPATIENT)
Dept: NEUROSURGERY | Facility: CLINIC | Age: 76
End: 2021-06-16
Payer: COMMERCIAL

## 2021-06-16 VITALS
HEART RATE: 68 BPM | TEMPERATURE: 98 F | RESPIRATION RATE: 16 BRPM | WEIGHT: 179 LBS | HEIGHT: 72 IN | SYSTOLIC BLOOD PRESSURE: 170 MMHG | DIASTOLIC BLOOD PRESSURE: 80 MMHG | BODY MASS INDEX: 24.24 KG/M2

## 2021-06-16 DIAGNOSIS — I67.1 ANEURYSM OF CAVERNOUS PORTION OF LEFT INTERNAL CAROTID ARTERY: Primary | ICD-10-CM

## 2021-06-16 DIAGNOSIS — H93.A9: ICD-10-CM

## 2021-06-16 PROCEDURE — 1036F TOBACCO NON-USER: CPT | Performed by: NEUROLOGICAL SURGERY

## 2021-06-16 PROCEDURE — 99203 OFFICE O/P NEW LOW 30 MIN: CPT | Performed by: NEUROLOGICAL SURGERY

## 2021-06-16 NOTE — PROGRESS NOTES
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  -     Ambulatory referral to Neurosurgery  -     MRI angiogram head without contrast; Future    Pulsatile tinnitus without middle ear myoclonus or eustachian tube dysfunction  -     Ambulatory referral to Neurosurgery  -     MRI angiogram head without contrast; Future        Discussion Summary:   1  Incidental/unruptured aneurysm 5 x 3 mm of left cavernous carotid artery  We discussed the natural history and diagnosis of intracranial aneurysm specifically that regarding to the cavernous carotid segment  We discussed the risks associated with hemorrhage, cavernous carotid fistula formation, and cranial nerve palsy  Typically I continue observation for these aneurysms unless they become symptomatic  As such I will plan for an MRA in 1 year  2  Pulsatile tinnitus  This is improving  Additionally I do not see any signs of venous ectasia or venous high-flow based on MRA  Should he have recurrent symptoms or worsening symptoms we can discuss formal arteriography for better evaluation of this  We will defer at this time  We briefly discussed the risks associated with a formal arteriogram     3  Bilateral cavernous carotid stenosis, left greater than right  Patient has well managed hypertension with the exception of today which she attributes to anxiety of this visit  However he is not on aspirin or a statin  I recommend that he initiate post these in conjunction with his primary care physician  Chief Complaint: Consult and Aneurysm        HPI:   This is a very pleasant 41-year-old gentleman who was previously admitted to the hospital in March due to urosepsis  Ultimately he recovered from this despite a significant weight loss    During his admission in follow-up afterwards he complains of pulsatile tinnitus and was evaluated by ENT for this  Ultimately it MRA of his neck had an MRI of his brain was performed  This demonstrated a cavernous carotid aneurysm, cavernous carotid stenosis, in no source of his pulsatile tinnitus  He states that his pulsatile tinnitus has somewhat improved  No longer bothersome as much  It began approximately 4-6 months ago  He denies any neurologic deficit  His past medical history significant for hypertension, hyperlipidemia, UTI  He has had right knee surgery in the past     He is   He has 3 children ages 36 to 46s  He is previously a   Denies alcohol tobacco or drug use  Quit smoking approximately 25 years ago    He is allergic to Bactrim  No family history of aneurysm or sudden death  Review of systems obtained by the MA reviewed and updated below  Review of Systems   Constitutional: Negative  HENT: Positive for tinnitus (pulsating ringing in ears not as noticable as it was about 2 months ago)  Eyes: Negative  Respiratory: Negative  Cardiovascular: Negative  Gastrointestinal: Negative  Endocrine: Negative  Genitourinary: Negative  Musculoskeletal: Negative  Skin: Negative  Allergic/Immunologic: Negative  Neurological: Negative  Hematological: Negative  Psychiatric/Behavioral: Negative  Physical Exam  Vitals:    06/16/21 1015   BP: 170/80   Pulse: 68   Resp: 16   Temp: 98 °F (36 7 °C)   He is well appearing  Affect is appropriate  His BMI is Body mass index is 24 28 kg/m²  Kayla Blend He is awake alert and oriented  Hearing and vision are grossly intact  His pupils are equal round reactive to light  His extraocular movements are intact  His face is symmetric  Tongue is midline  Facial sensation is intact and symmetric throughout  Shoulder shrug is 5/5  There is no drift or dysmetria  He has full strength in his bilateral upper and lower extremities  He has normal muscle tone muscle bulk    His biceps reflexes and patellar reflexes are 2+ and symmetric  Flash sign negative bilaterally  Sensation intact to light touch and pinprick throughout  His gait is normal     His heart rate is regular  His breath sounds are clear  2+ radial pulses no carotid bruit       The following portions of the patient's history were reviewed and updated as appropriate: allergies, current medications, past family history, past medical history, past social history, past surgical history and problem list     Active Ambulatory Problems     Diagnosis Date Noted    Type 2 diabetes mellitus with complication, without long-term current use of insulin ([place] [address] ) 10/23/2017    Essential hypertension 10/23/2017    Asthma 10/23/2017    Hyperlipidemia 10/23/2017    Hyponatremia 03/24/2021    Pulmonary nodule 03/24/2021    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 06/16/2021     Resolved Ambulatory Problems     Diagnosis Date Noted    Chest pain 03/22/2019    Sepsis ([place] [address] ) 03/24/2021    UTI (urinary tract infection) 03/24/2021    GERDA (acute kidney injury) ([place] [address] ) 03/24/2021    Drug reaction 03/24/2021     Past Medical History:   Diagnosis Date    Diabetes mellitus ([place] [address] )     Environmental allergies     Hypertension     Macular degeneration 07/13/2020    Orthostatic hypotension     Osteopenia     Sinusitis        Past Surgical History:   Procedure Laterality Date    COLONOSCOPY      KNEE ARTHROPLASTY      VASECTOMY      WISDOM TOOTH EXTRACTION      x4         Current Outpatient Medications:     albuterol (VENTOLIN HFA) 90 mcg/act inhaler, Ventolin HFA 90 mcg/actuation aerosol inhaler, Disp: , Rfl:     amLODIPine (NORVASC) 5 mg tablet, amlodipine 5 mg tablet, Disp: , Rfl:     atenolol (TENORMIN) 25 mg tablet, atenolol 25 mg tablet, Disp: , Rfl:     famotidine (PEPCID) 20 mg tablet, Take 1 tablet (20 mg total) by mouth daily, Disp: , Rfl: 0    glipiZIDE (GLUCOTROL XL) 2 5 mg 24 hr tablet, glipizide ER 2 5 mg tablet, extended release 24 hr, Disp: , Rfl:     lisinopril (ZESTRIL) 20 mg tablet, lisinopril 20 mg tablet, Disp: , Rfl:     LORazepam (ATIVAN) 0 5 mg tablet, lorazepam 0 5 mg tablet  TAKE ONE HALF TO ONE TABLET DAILY AS NEEDED, Disp: , Rfl:     metFORMIN (GLUCOPHAGE) 500 mg tablet, metformin 500 mg tablet, Disp: , Rfl:     montelukast (SINGULAIR) 10 mg tablet, montelukast 10 mg tablet, Disp: , Rfl:     predniSONE 5 mg tablet, prednisone 5 mg tablet  Take 1 tablet every day by oral route as needed for 90 days  , Disp: , Rfl:     aspirin 81 mg chewable tablet, Chew 1 tablet (81 mg total) daily for 30 days, Disp: 30 tablet, Rfl: 0    hydrOXYzine HCL (ATARAX) 25 mg tablet, hydroxyzine HCl 25 mg tablet, Disp: , Rfl:     nitroglycerin (NITROSTAT) 0 4 mg SL tablet, Place 1 tablet (0 4 mg total) under the tongue every 5 (five) minutes as needed for chest pain for up to 30 days, Disp: 30 tablet, Rfl: 0    Results/Data:   We reviewed his MRAs and MRI in detail as well as the reports

## 2021-08-04 ENCOUNTER — APPOINTMENT (OUTPATIENT)
Dept: LAB | Facility: CLINIC | Age: 76
End: 2021-08-04
Payer: COMMERCIAL

## 2021-08-04 PROCEDURE — 3060F POS MICROALBUMINURIA REV: CPT | Performed by: NEUROLOGICAL SURGERY

## 2022-02-28 ENCOUNTER — APPOINTMENT (OUTPATIENT)
Dept: LAB | Facility: CLINIC | Age: 77
End: 2022-02-28
Payer: COMMERCIAL

## 2022-03-29 ENCOUNTER — APPOINTMENT (OUTPATIENT)
Dept: RADIOLOGY | Facility: CLINIC | Age: 77
End: 2022-03-29
Payer: COMMERCIAL

## 2022-03-29 DIAGNOSIS — R06.00 DOE (DYSPNEA ON EXERTION): ICD-10-CM

## 2022-03-29 PROCEDURE — 71046 X-RAY EXAM CHEST 2 VIEWS: CPT

## 2022-04-12 ENCOUNTER — HOSPITAL ENCOUNTER (OUTPATIENT)
Dept: NUCLEAR MEDICINE | Facility: HOSPITAL | Age: 77
Discharge: HOME | End: 2022-04-12
Attending: INTERNAL MEDICINE
Payer: COMMERCIAL

## 2022-04-12 ENCOUNTER — HOSPITAL ENCOUNTER (OUTPATIENT)
Dept: NON INVASIVE DIAGNOSTICS | Facility: HOSPITAL | Age: 77
Discharge: HOME | End: 2022-04-12
Attending: INTERNAL MEDICINE
Payer: COMMERCIAL

## 2022-04-12 VITALS
RESPIRATION RATE: 16 BRPM | OXYGEN SATURATION: 99 % | SYSTOLIC BLOOD PRESSURE: 166 MMHG | HEIGHT: 72 IN | HEART RATE: 76 BPM | WEIGHT: 193 LBS | BODY MASS INDEX: 26.14 KG/M2 | DIASTOLIC BLOOD PRESSURE: 90 MMHG

## 2022-04-12 DIAGNOSIS — R06.02 SOB (SHORTNESS OF BREATH): ICD-10-CM

## 2022-04-12 PROCEDURE — 93017 CV STRESS TEST TRACING ONLY: CPT

## 2022-04-12 PROCEDURE — 78452 HT MUSCLE IMAGE SPECT MULT: CPT

## 2022-04-12 PROCEDURE — A9502 TC99M TETROFOSMIN: HCPCS

## 2022-04-12 RX ADMIN — REGADENOSON 0.4 MG: 0.08 INJECTION, SOLUTION INTRAVENOUS at 10:35

## 2022-04-13 LAB
BASELINE ST DEPRESSION: 0 MM
NUC STRESS EJECTION FRACTION: 59 %
RATE PRESSURE PRODUCT: NORMAL
SL CV REST NUCLEAR ISOTOPE DOSE: 11 MCI
SL CV STRESS NUCLEAR ISOTOPE DOSE: 31.8 MCI
SL CV STRESS RECOVERY BP: NORMAL MMHG
SL CV STRESS RECOVERY HR: 85 BPM
SL CV STRESS RECOVERY O2 SAT: 99 %
STRESS ANGINA INDEX: 0
STRESS BASELINE BP: NORMAL MMHG
STRESS BASELINE HR: 76 BPM
STRESS O2 SAT REST: 99 %
STRESS PEAK HR: 111 BPM
STRESS POST O2 SAT PEAK: 97 %
STRESS POST PEAK BP: 180 MMHG
STRESS ST DEPRESSION: 0.5 MM
STRESS/REST PERFUSION RATIO: 1.07

## 2022-04-13 PROCEDURE — 93016 CV STRESS TEST SUPVJ ONLY: CPT | Performed by: INTERNAL MEDICINE

## 2022-04-13 PROCEDURE — 78452 HT MUSCLE IMAGE SPECT MULT: CPT | Performed by: INTERNAL MEDICINE

## 2022-04-13 PROCEDURE — 93018 CV STRESS TEST I&R ONLY: CPT | Performed by: INTERNAL MEDICINE

## 2022-04-14 LAB
CHEST PAIN STATEMENT: NORMAL
MAX DIASTOLIC BP: 94 MMHG
MAX HEART RATE: 111 BPM
MAX PREDICTED HEART RATE: 144 BPM
MAX. SYSTOLIC BP: 180 MMHG
PROTOCOL NAME: NORMAL
REASON FOR TERMINATION: NORMAL
TARGET HR FORMULA: NORMAL
TEST INDICATION: NORMAL
TIME IN EXERCISE PHASE: NORMAL

## 2022-04-18 ENCOUNTER — HOSPITAL ENCOUNTER (OUTPATIENT)
Dept: NON INVASIVE DIAGNOSTICS | Facility: HOSPITAL | Age: 77
Discharge: HOME | End: 2022-04-18
Attending: INTERNAL MEDICINE
Payer: COMMERCIAL

## 2022-04-18 DIAGNOSIS — I65.23 BILATERAL CAROTID ARTERY STENOSIS: ICD-10-CM

## 2022-04-18 PROCEDURE — 93880 EXTRACRANIAL BILAT STUDY: CPT

## 2022-04-19 PROCEDURE — 93880 EXTRACRANIAL BILAT STUDY: CPT | Performed by: SURGERY

## 2022-06-13 ENCOUNTER — HOSPITAL ENCOUNTER (OUTPATIENT)
Dept: MRI IMAGING | Facility: HOSPITAL | Age: 77
Discharge: HOME | End: 2022-06-13
Payer: COMMERCIAL

## 2022-06-13 DIAGNOSIS — I67.1 ANEURYSM OF CAVERNOUS PORTION OF LEFT INTERNAL CAROTID ARTERY: ICD-10-CM

## 2022-06-13 DIAGNOSIS — H93.A9: ICD-10-CM

## 2022-06-13 PROCEDURE — 70544 MR ANGIOGRAPHY HEAD W/O DYE: CPT

## 2022-06-13 PROCEDURE — G1004 CDSM NDSC: HCPCS

## 2022-06-17 ENCOUNTER — APPOINTMENT (OUTPATIENT)
Dept: RADIOLOGY | Facility: CLINIC | Age: 77
End: 2022-06-17
Payer: COMMERCIAL

## 2022-06-17 ENCOUNTER — APPOINTMENT (OUTPATIENT)
Dept: LAB | Facility: CLINIC | Age: 77
End: 2022-06-17
Payer: COMMERCIAL

## 2022-06-17 DIAGNOSIS — R05.9 COUGH: ICD-10-CM

## 2022-06-17 DIAGNOSIS — R06.02 SOB (SHORTNESS OF BREATH): ICD-10-CM

## 2022-06-17 PROCEDURE — 71046 X-RAY EXAM CHEST 2 VIEWS: CPT

## 2022-06-22 ENCOUNTER — TELEMEDICINE (OUTPATIENT)
Dept: NEUROSURGERY | Facility: CLINIC | Age: 77
End: 2022-06-22
Payer: COMMERCIAL

## 2022-06-22 DIAGNOSIS — I67.1 ANEURYSM OF CAVERNOUS PORTION OF LEFT INTERNAL CAROTID ARTERY: Primary | ICD-10-CM

## 2022-06-22 PROCEDURE — 99213 OFFICE O/P EST LOW 20 MIN: CPT | Performed by: NEUROLOGICAL SURGERY

## 2022-06-22 RX ORDER — BUDESONIDE, GLYCOPYRROLATE, AND FORMOTEROL FUMARATE 160; 9; 4.8 UG/1; UG/1; UG/1
2 AEROSOL, METERED RESPIRATORY (INHALATION) EVERY 12 HOURS
COMMUNITY

## 2022-06-22 NOTE — PROGRESS NOTES
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    Relevant Orders    MRA head wo contrast               Reason for visit is   Chief Complaint   Patient presents with    Virtual Regular Visit        Encounter provider Mann Gordon MD    Provider [facility] at [facility]  [address] [place] [address] [address] [facility] [address] [phone]      Recent Visits  No visits were found meeting these conditions  Showing recent visits within past 7 days and meeting all other requirements  Today's Visits  Date Type Provider Dept   06/22/22 Telemedicine Mann Gordon, [facility] today's visits and meeting all other requirements  Future Appointments  No visits were found meeting these conditions  Showing future appointments within next 150 days and meeting all other requirements       The patient was identified by name and date of birth  Lawence Mari was informed that this is a telemedicine visit and that the visit is being conducted through Telephone  My office door was closed  No one else was in the room  He acknowledged consent and understanding of privacy and security of the video platform  The patient has agreed to participate and understands they can discontinue the visit at any time  Patient is aware this is a billable service  Discussion Summary:   1  Incidental/unruptured aneurysm 5 x 3 mm of left cavernous carotid artery  We once again discussed the natural history and diagnosis of intracranial aneurysms, specifically cavernous carotid aneurysms  We discussed the risks associated with hemorrhage, cavernous carotid fistula formation, and cranial nerve palsy    We discussed the options of continued follow up in 1 year, as needed, or in a delayed fashion  He is not ready to stop following this as such we will plan for at least 1 more MRI in 2 years      2  Pulsatile tinnitus  Remains intermittent, less frequent and bothersome then previously  Often accompanied by his regular tinnitus       3  Bilateral cavernous carotid stenosis, left greater than right  No stroke symptoms  It was my intent to perform this visit via video technology but the patient was not able to do a video connection so the visit was completed via audio telephone only  Chief Complaint: follow-up           HPI:   This is a very pleasant 79-year-old gentleman who was previously admitted to the hospital in March due to urosepsis  Ultimately he recovered from this despite a significant weight loss  During his admission in follow-up afterwards he complains of pulsatile tinnitus and was evaluated by ENT for this  Ultimately it MRA of his neck had an MRI of his brain was performed  This demonstrated a cavernous carotid aneurysm, cavernous carotid stenosis, and no source of his pulsatile tinnitus  He returns now approximately 1 year after his initial evaluation  In regards to his pulsatile tinnitus, this has significantly improved  It is only really occurs when he is doing loud work and accompanied with his regular tinnitus  It does not keep him up at night anymore or affect his activities of daily living  There is no evidence of causative vascular source on MRA  He is not had any neurologic changes over the last year  He continues to receive treatment for his right eye macular degeneration  months ago  He denies any neurologic deficit      His past medical history significant for hypertension, hyperlipidemia, UTI  He has had right knee surgery in the past      He is   He has 3 children ages 36 to 46s  He is previously a   Denies alcohol tobacco or drug use    Quit smoking approximately 25 years ago     He is allergic to Bactrim      No family history of aneurysm or sudden death  Review of systems obtained by the MA reviewed and updated below  Imaging: We reviewed his imaging in detail as well as the report  Past Medical History:   Diagnosis Date    Asthma     Diabetes mellitus ([address] )     Environmental allergies     Hyperlipidemia     Hypertension     Macular degeneration 07/13/2020    right eye    Orthostatic hypotension     Osteopenia     Sinusitis        Past Surgical History:   Procedure Laterality Date    COLONOSCOPY      KNEE ARTHROPLASTY      VASECTOMY      WISDOM TOOTH EXTRACTION      x4       Current Outpatient Medications   Medication Sig Dispense Refill    albuterol (PROVENTIL HFA,VENTOLIN HFA) 90 mcg/act inhaler Ventolin HFA 90 mcg/actuation aerosol inhaler      amLODIPine (NORVASC) 5 mg tablet Take 5 mg by mouth 2 (two) times a day      atenolol (TENORMIN) 25 mg tablet Take 25 mg by mouth daily      Budeson-Glycopyrrol-Formoterol (Breztri Aerosphere) 160-9-4 8 MCG/ACT AERO Take 2 puffs by mouth Every 12 hours      famotidine (PEPCID) 20 mg tablet Take 1 tablet (20 mg total) by mouth daily (Patient taking differently: Take 20 mg by mouth 2 (two) times a day)  0    glipiZIDE (GLUCOTROL XL) 2 5 mg 24 hr tablet Take 2 5 mg by mouth 2 (two) times a day      lisinopril (ZESTRIL) 20 mg tablet Take 20 mg by mouth 2 (two) times a day      LORazepam (ATIVAN) 0 5 mg tablet Take 0 5 mg by mouth daily at bedtime as needed      metFORMIN (GLUCOPHAGE) 500 mg tablet Take 500 mg by mouth 2 (two) times a day with meals      montelukast (SINGULAIR) 10 mg tablet Take 10 mg by mouth in the morning      predniSONE 5 mg tablet prednisone 5 mg tablet   Take 1 tablet every day by oral route as needed for 90 days  No current facility-administered medications for this visit          Allergies   Allergen Reactions    Bactrim [Sulfamethoxazole-Trimethoprim] Fever     Fever of 107       Review of Systems   Constitutional: Negative  HENT: Positive for tinnitus (years and pulsating tinnitus)  Eyes: Positive for visual disturbance (macular degeneration)  Respiratory: Positive for shortness of breath (allergies/asthma)  Cardiovascular: Negative  Gastrointestinal: Negative  Endocrine: Negative  Genitourinary: Negative  Musculoskeletal: Negative  Skin: Negative  Allergic/Immunologic: Negative  Neurological: Negative  Hematological: Negative  Psychiatric/Behavioral: Negative  Video Exam    There were no vitals filed for this visit  Physical Exam   He is awake alert and interactive  Physical exam deferred due to telephone nature of visit  I spent 15 minutes directly with the patient during this visit    VIRTUAL VISIT [address] verbally agrees to participate in [facility]  Pt is aware that [facility] could be limited without vital signs or the ability to perform a full hands-on physical exam  Gold Banuelos understands he or the provider may request at any time to terminate the video visit and request the patient to seek care or treatment in person

## 2022-10-21 ENCOUNTER — APPOINTMENT (OUTPATIENT)
Dept: RADIOLOGY | Facility: CLINIC | Age: 77
End: 2022-10-21
Payer: COMMERCIAL

## 2022-10-21 ENCOUNTER — APPOINTMENT (OUTPATIENT)
Dept: LAB | Facility: CLINIC | Age: 77
End: 2022-10-21
Payer: COMMERCIAL

## 2022-10-21 DIAGNOSIS — R10.9 ABDOMINAL PAIN, UNSPECIFIED ABDOMINAL LOCATION: ICD-10-CM

## 2022-10-21 DIAGNOSIS — R53.83 FATIGUE, UNSPECIFIED TYPE: ICD-10-CM

## 2022-10-21 DIAGNOSIS — E11.9 TYPE 2 DIABETES MELLITUS WITHOUT COMPLICATION, WITHOUT LONG-TERM CURRENT USE OF INSULIN (HCC): ICD-10-CM

## 2022-10-21 DIAGNOSIS — Z79.899 ENCOUNTER FOR LONG-TERM (CURRENT) USE OF OTHER MEDICATIONS: ICD-10-CM

## 2022-10-21 DIAGNOSIS — R63.4 WEIGHT LOSS: ICD-10-CM

## 2022-10-21 LAB
ALBUMIN SERPL BCP-MCNC: 3.4 G/DL (ref 3.5–5)
ALP SERPL-CCNC: 79 U/L (ref 46–116)
ALT SERPL W P-5'-P-CCNC: 21 U/L (ref 12–78)
ANION GAP SERPL CALCULATED.3IONS-SCNC: 8 MMOL/L (ref 4–13)
AST SERPL W P-5'-P-CCNC: 10 U/L (ref 5–45)
BILIRUB SERPL-MCNC: 0.33 MG/DL (ref 0.2–1)
BUN SERPL-MCNC: 18 MG/DL (ref 5–25)
CALCIUM ALBUM COR SERPL-MCNC: 10.3 MG/DL (ref 8.3–10.1)
CALCIUM SERPL-MCNC: 9.8 MG/DL (ref 8.3–10.1)
CHLORIDE SERPL-SCNC: 104 MMOL/L (ref 96–108)
CO2 SERPL-SCNC: 25 MMOL/L (ref 21–32)
CREAT SERPL-MCNC: 1.27 MG/DL (ref 0.6–1.3)
ERYTHROCYTE [DISTWIDTH] IN BLOOD BY AUTOMATED COUNT: 14.1 % (ref 11.6–15.1)
EST. AVERAGE GLUCOSE BLD GHB EST-MCNC: 174 MG/DL
GFR SERPL CREATININE-BSD FRML MDRD: 54 ML/MIN/1.73SQ M
GLUCOSE P FAST SERPL-MCNC: 97 MG/DL (ref 65–99)
HBA1C MFR BLD: 7.7 %
HCT VFR BLD AUTO: 39.9 % (ref 36.5–49.3)
HGB BLD-MCNC: 13 G/DL (ref 12–17)
MCH RBC QN AUTO: 30 PG (ref 26.8–34.3)
MCHC RBC AUTO-ENTMCNC: 32.6 G/DL (ref 31.4–37.4)
MCV RBC AUTO: 92 FL (ref 82–98)
PLATELET # BLD AUTO: 254 THOUSANDS/UL (ref 149–390)
PMV BLD AUTO: 9.7 FL (ref 8.9–12.7)
POTASSIUM SERPL-SCNC: 4.1 MMOL/L (ref 3.5–5.3)
PROT SERPL-MCNC: 7.3 G/DL (ref 6.4–8.4)
RBC # BLD AUTO: 4.33 MILLION/UL (ref 3.88–5.62)
SODIUM SERPL-SCNC: 137 MMOL/L (ref 135–147)
T4 FREE SERPL-MCNC: 1.05 NG/DL (ref 0.76–1.46)
TSH SERPL DL<=0.05 MIU/L-ACNC: 2.26 UIU/ML (ref 0.45–4.5)
WBC # BLD AUTO: 8.66 THOUSAND/UL (ref 4.31–10.16)

## 2022-10-21 PROCEDURE — 36415 COLL VENOUS BLD VENIPUNCTURE: CPT

## 2022-10-21 PROCEDURE — 85027 COMPLETE CBC AUTOMATED: CPT

## 2022-10-21 PROCEDURE — 71046 X-RAY EXAM CHEST 2 VIEWS: CPT

## 2022-10-21 PROCEDURE — 83036 HEMOGLOBIN GLYCOSYLATED A1C: CPT

## 2022-10-21 PROCEDURE — 84439 ASSAY OF FREE THYROXINE: CPT

## 2022-10-21 PROCEDURE — 74018 RADEX ABDOMEN 1 VIEW: CPT

## 2022-10-21 PROCEDURE — 80053 COMPREHEN METABOLIC PANEL: CPT

## 2022-10-21 PROCEDURE — 84443 ASSAY THYROID STIM HORMONE: CPT

## 2023-01-19 ENCOUNTER — APPOINTMENT (OUTPATIENT)
Dept: LAB | Facility: CLINIC | Age: 78
End: 2023-01-19

## 2023-01-19 DIAGNOSIS — E11.9 TYPE 2 DIABETES MELLITUS WITHOUT COMPLICATION, WITHOUT LONG-TERM CURRENT USE OF INSULIN (HCC): ICD-10-CM

## 2023-01-19 DIAGNOSIS — E78.5 HYPERLIPIDEMIA, UNSPECIFIED HYPERLIPIDEMIA TYPE: ICD-10-CM

## 2023-01-19 DIAGNOSIS — E55.9 VITAMIN D DEFICIENCY: ICD-10-CM

## 2023-01-19 LAB
25(OH)D3 SERPL-MCNC: 26.4 NG/ML (ref 30–100)
ALBUMIN SERPL BCP-MCNC: 3.3 G/DL (ref 3.5–5)
ALP SERPL-CCNC: 76 U/L (ref 46–116)
ALT SERPL W P-5'-P-CCNC: 22 U/L (ref 12–78)
ANION GAP SERPL CALCULATED.3IONS-SCNC: 6 MMOL/L (ref 4–13)
AST SERPL W P-5'-P-CCNC: 12 U/L (ref 5–45)
BILIRUB SERPL-MCNC: 0.35 MG/DL (ref 0.2–1)
BUN SERPL-MCNC: 20 MG/DL (ref 5–25)
CALCIUM ALBUM COR SERPL-MCNC: 10 MG/DL (ref 8.3–10.1)
CALCIUM SERPL-MCNC: 9.4 MG/DL (ref 8.3–10.1)
CHLORIDE SERPL-SCNC: 105 MMOL/L (ref 96–108)
CHOLEST SERPL-MCNC: 199 MG/DL
CO2 SERPL-SCNC: 26 MMOL/L (ref 21–32)
CREAT SERPL-MCNC: 1.36 MG/DL (ref 0.6–1.3)
GFR SERPL CREATININE-BSD FRML MDRD: 49 ML/MIN/1.73SQ M
GLUCOSE P FAST SERPL-MCNC: 122 MG/DL (ref 65–99)
HDLC SERPL-MCNC: 51 MG/DL
LDLC SERPL CALC-MCNC: 124 MG/DL (ref 0–100)
NONHDLC SERPL-MCNC: 148 MG/DL
POTASSIUM SERPL-SCNC: 4.2 MMOL/L (ref 3.5–5.3)
PROT SERPL-MCNC: 6.9 G/DL (ref 6.4–8.4)
SODIUM SERPL-SCNC: 137 MMOL/L (ref 135–147)
TRIGL SERPL-MCNC: 120 MG/DL

## 2023-01-20 LAB
EST. AVERAGE GLUCOSE BLD GHB EST-MCNC: 226 MG/DL
HBA1C MFR BLD: 9.5 %

## 2023-05-25 ENCOUNTER — APPOINTMENT (OUTPATIENT)
Age: 78
End: 2023-05-25

## 2023-05-25 DIAGNOSIS — I10 HYPERTENSION, UNSPECIFIED TYPE: ICD-10-CM

## 2023-05-25 DIAGNOSIS — E11.9 TYPE 2 DIABETES MELLITUS WITHOUT COMPLICATION, WITHOUT LONG-TERM CURRENT USE OF INSULIN (HCC): ICD-10-CM

## 2023-05-25 DIAGNOSIS — E55.9 VITAMIN D DEFICIENCY: ICD-10-CM

## 2023-05-25 DIAGNOSIS — E78.5 HYPERLIPIDEMIA, UNSPECIFIED HYPERLIPIDEMIA TYPE: ICD-10-CM

## 2023-05-25 LAB
25(OH)D3 SERPL-MCNC: 35.5 NG/ML (ref 30–100)
ALBUMIN SERPL BCP-MCNC: 3.3 G/DL (ref 3.5–5)
ALP SERPL-CCNC: 76 U/L (ref 46–116)
ALT SERPL W P-5'-P-CCNC: 21 U/L (ref 12–78)
ANION GAP SERPL CALCULATED.3IONS-SCNC: 2 MMOL/L (ref 4–13)
AST SERPL W P-5'-P-CCNC: 15 U/L (ref 5–45)
BILIRUB SERPL-MCNC: 0.47 MG/DL (ref 0.2–1)
BUN SERPL-MCNC: 21 MG/DL (ref 5–25)
CALCIUM ALBUM COR SERPL-MCNC: 10.1 MG/DL (ref 8.3–10.1)
CALCIUM SERPL-MCNC: 9.5 MG/DL (ref 8.3–10.1)
CHLORIDE SERPL-SCNC: 108 MMOL/L (ref 96–108)
CHOLEST SERPL-MCNC: 218 MG/DL
CO2 SERPL-SCNC: 26 MMOL/L (ref 21–32)
CREAT SERPL-MCNC: 1.13 MG/DL (ref 0.6–1.3)
GFR SERPL CREATININE-BSD FRML MDRD: 62 ML/MIN/1.73SQ M
GLUCOSE P FAST SERPL-MCNC: 137 MG/DL (ref 65–99)
HDLC SERPL-MCNC: 50 MG/DL
LDLC SERPL CALC-MCNC: 156 MG/DL (ref 0–100)
NONHDLC SERPL-MCNC: 168 MG/DL
POTASSIUM SERPL-SCNC: 4.3 MMOL/L (ref 3.5–5.3)
PROT SERPL-MCNC: 7.5 G/DL (ref 6.4–8.4)
SODIUM SERPL-SCNC: 136 MMOL/L (ref 135–147)
TRIGL SERPL-MCNC: 60 MG/DL

## 2023-05-26 LAB
EST. AVERAGE GLUCOSE BLD GHB EST-MCNC: 194 MG/DL
HBA1C MFR BLD: 8.4 %

## 2023-07-03 ENCOUNTER — HOSPITAL ENCOUNTER (OUTPATIENT)
Dept: NON INVASIVE DIAGNOSTICS | Facility: HOSPITAL | Age: 78
Discharge: HOME | End: 2023-07-03
Payer: COMMERCIAL

## 2023-07-03 DIAGNOSIS — L97.511 RIGHT FOOT ULCER, LIMITED TO BREAKDOWN OF SKIN (HCC): ICD-10-CM

## 2023-07-03 DIAGNOSIS — E73.9 LACTOSE INTOLERANCE, UNSPECIFIED: ICD-10-CM

## 2023-07-03 PROCEDURE — 93925 LOWER EXTREMITY STUDY: CPT

## 2023-07-03 PROCEDURE — 93923 UPR/LXTR ART STDY 3+ LVLS: CPT

## 2023-07-04 PROCEDURE — 93925 LOWER EXTREMITY STUDY: CPT | Performed by: SURGERY

## 2023-07-04 PROCEDURE — 93922 UPR/L XTREMITY ART 2 LEVELS: CPT | Performed by: SURGERY

## 2023-10-02 ENCOUNTER — APPOINTMENT (OUTPATIENT)
Age: 78
End: 2023-10-02
Payer: COMMERCIAL

## 2023-10-02 DIAGNOSIS — E55.9 VITAMIN D DEFICIENCY: ICD-10-CM

## 2023-10-02 DIAGNOSIS — I10 HYPERTENSION, UNSPECIFIED TYPE: ICD-10-CM

## 2023-10-02 DIAGNOSIS — E11.9 TYPE 2 DIABETES MELLITUS WITHOUT COMPLICATION, WITHOUT LONG-TERM CURRENT USE OF INSULIN (HCC): ICD-10-CM

## 2023-10-02 DIAGNOSIS — E78.5 HYPERLIPIDEMIA, UNSPECIFIED HYPERLIPIDEMIA TYPE: ICD-10-CM

## 2023-10-02 LAB
25(OH)D3 SERPL-MCNC: 26 NG/ML (ref 30–100)
ALBUMIN SERPL BCP-MCNC: 3.7 G/DL (ref 3.5–5)
ALP SERPL-CCNC: 61 U/L (ref 34–104)
ALT SERPL W P-5'-P-CCNC: 11 U/L (ref 7–52)
ANION GAP SERPL CALCULATED.3IONS-SCNC: 6 MMOL/L
AST SERPL W P-5'-P-CCNC: 12 U/L (ref 13–39)
BILIRUB SERPL-MCNC: 0.43 MG/DL (ref 0.2–1)
BUN SERPL-MCNC: 14 MG/DL (ref 5–25)
CALCIUM SERPL-MCNC: 9.4 MG/DL (ref 8.4–10.2)
CHLORIDE SERPL-SCNC: 101 MMOL/L (ref 96–108)
CHOLEST SERPL-MCNC: 195 MG/DL
CO2 SERPL-SCNC: 27 MMOL/L (ref 21–32)
CREAT SERPL-MCNC: 1.06 MG/DL (ref 0.6–1.3)
ERYTHROCYTE [DISTWIDTH] IN BLOOD BY AUTOMATED COUNT: 13.4 % (ref 11.6–15.1)
EST. AVERAGE GLUCOSE BLD GHB EST-MCNC: 166 MG/DL
GFR SERPL CREATININE-BSD FRML MDRD: 67 ML/MIN/1.73SQ M
GLUCOSE P FAST SERPL-MCNC: 136 MG/DL (ref 65–99)
HBA1C MFR BLD: 7.4 %
HCT VFR BLD AUTO: 38.4 % (ref 36.5–49.3)
HDLC SERPL-MCNC: 42 MG/DL
HGB BLD-MCNC: 12.5 G/DL (ref 12–17)
LDLC SERPL CALC-MCNC: 139 MG/DL (ref 0–100)
MCH RBC QN AUTO: 30.2 PG (ref 26.8–34.3)
MCHC RBC AUTO-ENTMCNC: 32.6 G/DL (ref 31.4–37.4)
MCV RBC AUTO: 93 FL (ref 82–98)
NONHDLC SERPL-MCNC: 153 MG/DL
PLATELET # BLD AUTO: 343 THOUSANDS/UL (ref 149–390)
PMV BLD AUTO: 9.7 FL (ref 8.9–12.7)
POTASSIUM SERPL-SCNC: 4.3 MMOL/L (ref 3.5–5.3)
PROT SERPL-MCNC: 6.8 G/DL (ref 6.4–8.4)
RBC # BLD AUTO: 4.14 MILLION/UL (ref 3.88–5.62)
SODIUM SERPL-SCNC: 134 MMOL/L (ref 135–147)
TRIGL SERPL-MCNC: 72 MG/DL
WBC # BLD AUTO: 7.67 THOUSAND/UL (ref 4.31–10.16)

## 2023-10-02 PROCEDURE — 36415 COLL VENOUS BLD VENIPUNCTURE: CPT

## 2023-10-02 PROCEDURE — 80053 COMPREHEN METABOLIC PANEL: CPT

## 2023-10-02 PROCEDURE — 82306 VITAMIN D 25 HYDROXY: CPT

## 2023-10-02 PROCEDURE — 83036 HEMOGLOBIN GLYCOSYLATED A1C: CPT

## 2023-10-02 PROCEDURE — 85027 COMPLETE CBC AUTOMATED: CPT

## 2023-10-02 PROCEDURE — 80061 LIPID PANEL: CPT

## 2024-01-23 ENCOUNTER — APPOINTMENT (OUTPATIENT)
Dept: RADIOLOGY | Facility: CLINIC | Age: 79
End: 2024-01-23
Payer: COMMERCIAL

## 2024-01-23 DIAGNOSIS — R05.1 ACUTE COUGH: ICD-10-CM

## 2024-01-23 PROCEDURE — 71046 X-RAY EXAM CHEST 2 VIEWS: CPT

## 2024-01-25 ENCOUNTER — EVALUATION (OUTPATIENT)
Dept: PHYSICAL THERAPY | Facility: CLINIC | Age: 79
End: 2024-01-25
Payer: COMMERCIAL

## 2024-01-25 DIAGNOSIS — M25.361 RIGHT KNEE BUCKLING: Primary | ICD-10-CM

## 2024-01-25 PROCEDURE — 97162 PT EVAL MOD COMPLEX 30 MIN: CPT | Performed by: PHYSICAL THERAPIST

## 2024-01-25 PROCEDURE — 97110 THERAPEUTIC EXERCISES: CPT | Performed by: PHYSICAL THERAPIST

## 2024-01-25 NOTE — PROGRESS NOTES
"PT Evaluation     Today's date: 2024  Patient name: Gold Van  : 1945  MRN: [id number]  Referring provider: Richard Herron*  Dx:   Encounter Diagnosis     ICD-10-CM    1. Chronic pain of right knee  M25.561     G89.29                      Assessment  Assessment details: Gold \"Scooter\" Carlota is a 78 y.o. male who presents with complaints of right knee buckling. No further referral appears necessary at this time based upon examination results. Contributing factors to knee instability are weakness in glute med and glute max mm b/l. Patient will benefit from skilled PT services to address impairments and maximize function.  Prognosis is good given HEP compliance and PT 2x/wk. Patient goal is to transition to gym/HEP after DC from PT. Please contact me if you have any questions or recommendations.  Thank you for the opportunity to share in Scooter's care.     Impairments: abnormal or restricted ROM, activity intolerance, impaired balance, impaired physical strength, lacks appropriate home exercise program and safety issue  Functional limitations: buckling causing patient to fall or almost fall, unsteady on stairs (uses railing), unsteady with walking (walks with walking stick), difficulty transferring from floor to standUnderstanding of Dx/Px/POC: good   Prognosis: good    Goals  STGs  1) In 4 weeks patient will demonstrate 1/2 grade improvement in hip strength (abduction and extensions)  2) In 4 weeks patient will demonstrate 10 deg improvement in HS flexibility on R  3) In 4 weeks patient will demonstrate improvement in SLS balance, able to maintain SLS for 5 sec or more on each LE    LTGs  1) In 4-8 weeks patient will report no falls or near falls  2) in 4-8 weeks patient will report no difficulty with walking  3) In 4-8 weeks patient will report no difficulty on stairs or curbs  4) In 4-8 weeks patient will demonstrate ability to transfer from kneeling on floor to standing without holding on " "with UEs    Plan  Patient would benefit from: skilled physical therapy  Referral necessary: No  Planned modality interventions: cryotherapy  Planned therapy interventions: manual therapy, massage, Garza taping, kinesiology taping, balance, balance/weight bearing training, neuromuscular re-education, patient education, self care, strengthening, stretching, therapeutic activities, therapeutic exercise, gait training, functional ROM exercises, flexibility and home exercise program  Frequency: 2x week  Duration in weeks: 8  Plan of Care beginning date: 1/25/2024  Plan of Care expiration date: 3/21/2024  Treatment plan discussed with: patient        Subjective Evaluation    History of Present Illness  Mechanism of injury: -patient has c/o R knee buckling on him, about a month ago he fell due to the knee giving out of him  -saw orthopedic (Dr. Herron) and was referred to PT for strengthening and stabilization exercises  -x-ray shows OA, but no discussion was had about a knee replacement  -no injections  -patient does not have complaints of pain, primary concern is instability  -h/o R knee surgery when he was 18 y/o, had cartilage surgery  -his concern is that he likes to be active and go for walks, but he cannot keep up with it anymore  -walks with a walking stick  -at times furniture surfs in his home  Patient Goals  Patient goals for therapy: improved balance, increased strength and return to sport/leisure activities  Patient goal: transition from PT to a HEP/gym routine, gain strength and stability in his leg  Pain  No pain reported    Social Support  Stairs in house: yes (uses railing)   Lives in: multiple-level home  Lives with: spouse    Employment status: not working    Diagnostic Tests  X-ray: normal (\"arthritis\")  Treatments  No previous or current treatments        Objective     Active Range of Motion   Left Knee   Normal active range of motion    Right Knee   Normal active range of motion    Additional " "Active Range of Motion Details  HS 90-90: lacks 35 deg active knee ext on R    Strength/Myotome Testing     Left Hip   Planes of Motion   Flexion: 4+  Extension: 4  Abduction: 3-  External rotation: 4+  Internal rotation: 4+    Isolated Muscles   Gluteus evelia: 4-    Right Hip   Planes of Motion   Flexion: 4+  Extension: 4  Abduction: 3-  External rotation: 4+  Internal rotation: 4+    Isolated Muscles   Gluteus maximums: 3+    Left Knee   Flexion: 5  Extension: 5  Quadriceps contraction: good    Right Knee   Flexion: 5  Extension: 5  Quadriceps contraction: good    Functional Assessment        Single Leg Stance - Eyes Open   Left  Trial 1: 1 seconds  Trial 2: 2 seconds  Trial 3: 3 seconds  Average: 2 seconds     Right  Trial 1: 1 seconds  Trial 2: 2 seconds  Trial 3: 1 seconds  Average: 1.33 seconds              Precautions: diabetic, osteopenia  POC exp 3/21/24  Manuals 1/25                                       Neuro Re-Ed        SLS balance        Dynamic balance: Tandem walking        Dynamic balance: standing hip 3 way        Dynamic balance: standing marches on foam        Dynamic balance: side stepping across foam                        Ther Ex        Nustep for LE strength and endurance L4 x10 mins       Bridges 5\" 2x10       Supine SLR flexion 2x10 ea       Sidelying hip abduction        HS stretch        Lateral step ups        Knee ext machine                Ther Activity        Mini squats with chair behind        Mini lunges        Step ups        Gait Training                        Modalities                             "

## 2024-01-25 NOTE — LETTER
"2024    Richard Herron MD  [address] [place] [id number]    Patient: Gold Van   YOB: 1945   Date of Visit: 2024     Encounter Diagnosis     ICD-10-CM    1. Right knee buckling  M25.361           Dear Dr. Herron:    Thank you for your recent referral of Gold Van. Please review the attached evaluation summary from Gold's recent visit.     Please verify that you agree with the plan of care by signing the attached order.     If you have any questions or concerns, please do not hesitate to call.     I sincerely appreciate the opportunity to share in the care of one of your patients and hope to have another opportunity to work with you in the near future.       Sincerely,    Alice Muniz, PT      Referring Provider:      I certify that I have read the below Plan of Care and certify the need for these services furnished under this plan of treatment while under my care.                    Richard Herron MD  [address] [facility] [address]  Via Fax: [phone]          PT Evaluation     Today's date: 2024  Patient name: Gold Van  : 1945  MRN: [id number]  Referring provider: Richard Herron*  Dx:   Encounter Diagnosis     ICD-10-CM    1. Chronic pain of right knee  M25.561     G89.29                      Assessment  Assessment details: Gold \"Scooter\" Carlota is a 78 y.o. male who presents with complaints of right knee buckling. No further referral appears necessary at this time based upon examination results. Contributing factors to knee instability are weakness in glute med and glute max mm b/l. Patient will benefit from skilled PT services to address impairments and maximize function.  Prognosis is good given HEP compliance and PT 2x/wk. Patient goal is to transition to gym/HEP after DC from PT. Please contact me if you have any questions or recommendations.  Thank you for the opportunity to share in Scooter's care. "     Impairments: abnormal or restricted ROM, activity intolerance, impaired balance, impaired physical strength, lacks appropriate home exercise program and safety issue  Functional limitations: buckling causing patient to fall or almost fall, unsteady on stairs (uses railing), unsteady with walking (walks with walking stick), difficulty transferring from floor to standUnderstanding of Dx/Px/POC: good   Prognosis: good    Goals  STGs  1) In 4 weeks patient will demonstrate 1/2 grade improvement in hip strength (abduction and extensions)  2) In 4 weeks patient will demonstrate 10 deg improvement in HS flexibility on R  3) In 4 weeks patient will demonstrate improvement in SLS balance, able to maintain SLS for 5 sec or more on each LE    LTGs  1) In 4-8 weeks patient will report no falls or near falls  2) in 4-8 weeks patient will report no difficulty with walking  3) In 4-8 weeks patient will report no difficulty on stairs or curbs  4) In 4-8 weeks patient will demonstrate ability to transfer from kneeling on floor to standing without holding on with UEs    Plan  Patient would benefit from: skilled physical therapy  Referral necessary: No  Planned modality interventions: cryotherapy  Planned therapy interventions: manual therapy, massage, Garza taping, kinesiology taping, balance, balance/weight bearing training, neuromuscular re-education, patient education, self care, strengthening, stretching, therapeutic activities, therapeutic exercise, gait training, functional ROM exercises, flexibility and home exercise program  Frequency: 2x week  Duration in weeks: 8  Plan of Care beginning date: 1/25/2024  Plan of Care expiration date: 3/21/2024  Treatment plan discussed with: patient        Subjective Evaluation    History of Present Illness  Mechanism of injury: -patient has c/o R knee buckling on him, about a month ago he fell due to the knee giving out of him  -saw orthopedic (Dr. Herron) and was referred to PT  "for strengthening and stabilization exercises  -x-ray shows OA, but no discussion was had about a knee replacement  -no injections  -patient does not have complaints of pain, primary concern is instability  -h/o R knee surgery when he was 16 y/o, had cartilage surgery  -his concern is that he likes to be active and go for walks, but he cannot keep up with it anymore  -walks with a walking stick  -at times furniture surfs in his home  Patient Goals  Patient goals for therapy: improved balance, increased strength and return to sport/leisure activities  Patient goal: transition from PT to a HEP/gym routine, gain strength and stability in his leg  Pain  No pain reported    Social Support  Stairs in house: yes (uses railing)   Lives in: multiple-level home  Lives with: spouse    Employment status: not working    Diagnostic Tests  X-ray: normal (\"arthritis\")  Treatments  No previous or current treatments        Objective     Active Range of Motion   Left Knee   Normal active range of motion    Right Knee   Normal active range of motion    Additional Active Range of Motion Details  HS 90-90: lacks 35 deg active knee ext on R    Strength/Myotome Testing     Left Hip   Planes of Motion   Flexion: 4+  Extension: 4  Abduction: 3-  External rotation: 4+  Internal rotation: 4+    Isolated Muscles   Gluteus evelia: 4-    Right Hip   Planes of Motion   Flexion: 4+  Extension: 4  Abduction: 3-  External rotation: 4+  Internal rotation: 4+    Isolated Muscles   Gluteus maximums: 3+    Left Knee   Flexion: 5  Extension: 5  Quadriceps contraction: good    Right Knee   Flexion: 5  Extension: 5  Quadriceps contraction: good    Functional Assessment        Single Leg Stance - Eyes Open   Left  Trial 1: 1 seconds  Trial 2: 2 seconds  Trial 3: 3 seconds  Average: 2 seconds     Right  Trial 1: 1 seconds  Trial 2: 2 seconds  Trial 3: 1 seconds  Average: 1.33 seconds              Precautions: diabetic, osteopenia  POC exp 3/21/24  Manuals " "1/25                                       Neuro Re-Ed        SLS balance        Dynamic balance: Tandem walking        Dynamic balance: standing hip 3 way        Dynamic balance: standing marches on foam        Dynamic balance: side stepping across foam                        Ther Ex        Nustep for LE strength and endurance L4 x10 mins       Bridges 5\" 2x10       Supine SLR flexion 2x10 ea       Sidelying hip abduction        HS stretch        Lateral step ups        Knee ext machine                Ther Activity        Mini squats with chair behind        Mini lunges        Step ups        Gait Training                        Modalities                                             "

## 2024-01-29 ENCOUNTER — OFFICE VISIT (OUTPATIENT)
Dept: PHYSICAL THERAPY | Facility: CLINIC | Age: 79
End: 2024-01-29
Payer: COMMERCIAL

## 2024-01-29 DIAGNOSIS — M25.361 RIGHT KNEE BUCKLING: Primary | ICD-10-CM

## 2024-01-29 PROCEDURE — 97110 THERAPEUTIC EXERCISES: CPT

## 2024-01-29 PROCEDURE — 97530 THERAPEUTIC ACTIVITIES: CPT

## 2024-02-01 ENCOUNTER — OFFICE VISIT (OUTPATIENT)
Dept: PHYSICAL THERAPY | Facility: CLINIC | Age: 79
End: 2024-02-01
Payer: COMMERCIAL

## 2024-02-01 DIAGNOSIS — M25.361 RIGHT KNEE BUCKLING: Primary | ICD-10-CM

## 2024-02-01 PROCEDURE — 97110 THERAPEUTIC EXERCISES: CPT | Performed by: PHYSICAL THERAPIST

## 2024-02-01 PROCEDURE — 97530 THERAPEUTIC ACTIVITIES: CPT | Performed by: PHYSICAL THERAPIST

## 2024-02-01 NOTE — PROGRESS NOTES
"Daily Note     Today's date: 2024  Patient name: Gold Van  : 1945  MRN: [id number]  Referring provider: Richard Herron*  Dx:   Encounter Diagnosis     ICD-10-CM    1. Right knee buckling  M25.361                      Subjective: Patient offers no new complaints today. Denies any recent episodes of knee buckling.       Objective: See treatment diary below      Assessment: Tolerated treatment well. Progressed through outlined POC without incident. Patient demonstrated fatigue post treatment, exhibited good technique with therapeutic exercises, and would benefit from continued PT      Plan: Continue per plan of care.  Progress treatment as tolerated.       Precautions: diabetic, osteopenia  POC exp 3/21/24  Manuals                                      Neuro Re-Ed        SLS balance        Dynamic balance: Tandem walking        Dynamic balance: standing hip 3 way        Dynamic balance: standing marches on foam        Dynamic balance: side stepping across foam                        Ther Ex        Nustep for LE strength and endurance L4 x10 mins L4 x10 min L4 x10 mins     Bridges 5\" 2x10 5\" x20 5\" x20     Supine SLR flexion 2x10 ea 2x10 ea 2x10 ea     Sidelying hip abduction  2x10 ea 2x10 ea     Prone hip extension   2x10 ea     HS stretch   Supine w/strap  30\"x3 ea     Lateral step ups  6\" 2x10 ea 6\" 2x10 ea     Knee ext machine                Ther Activity        Mini squats with chair behind  x10 3\" 2x10     Mini lunges        Step ups  6\" 2x10 ea 6\" 2x10 ea     Gait Training                        Modalities                               "

## 2024-02-05 ENCOUNTER — APPOINTMENT (OUTPATIENT)
Dept: PHYSICAL THERAPY | Facility: CLINIC | Age: 79
End: 2024-02-05
Payer: COMMERCIAL

## 2024-02-08 ENCOUNTER — OFFICE VISIT (OUTPATIENT)
Dept: PHYSICAL THERAPY | Facility: CLINIC | Age: 79
End: 2024-02-08
Payer: COMMERCIAL

## 2024-02-08 DIAGNOSIS — M25.361 RIGHT KNEE BUCKLING: Primary | ICD-10-CM

## 2024-02-08 PROCEDURE — 97530 THERAPEUTIC ACTIVITIES: CPT

## 2024-02-08 PROCEDURE — 97110 THERAPEUTIC EXERCISES: CPT

## 2024-02-12 ENCOUNTER — OFFICE VISIT (OUTPATIENT)
Dept: PHYSICAL THERAPY | Facility: CLINIC | Age: 79
End: 2024-02-12
Payer: COMMERCIAL

## 2024-02-12 DIAGNOSIS — M25.361 RIGHT KNEE BUCKLING: Primary | ICD-10-CM

## 2024-02-12 PROCEDURE — 97110 THERAPEUTIC EXERCISES: CPT | Performed by: PHYSICAL THERAPIST

## 2024-02-12 PROCEDURE — 97530 THERAPEUTIC ACTIVITIES: CPT | Performed by: PHYSICAL THERAPIST

## 2024-02-12 NOTE — PROGRESS NOTES
"Daily Note     Today's date: 2024  Patient name: Gold Van  : 1945  MRN: [id number]  Referring provider: Richard Herron*  Dx:   Encounter Diagnosis     ICD-10-CM    1. Right knee buckling  M25.361                      Subjective: Patient reports he is feeling better this week. He has not noted any buckling in the knee lately. He does feel he is getting stronger.       Objective: See treatment diary below      Assessment: Tolerated treatment well. Held from prone hip ext exercise secondary to increased pain in previous sessions. Patient demonstrated fatigue post treatment, exhibited good technique with therapeutic exercises, and would benefit from continued PT      Plan: Continue per plan of care.  Progress treatment as tolerated.   Begin balance training NV     Precautions: diabetic, osteopenia  POC exp 3/21/24  Manuals                                    Neuro Re-Ed        SLS balance     NV   Dynamic balance: Tandem walking     NV   Dynamic balance: standing hip 3 way     NV   Dynamic balance: standing marches on foam        Dynamic balance: side stepping across foam                        Ther Ex        Nustep for LE strength and endurance L4 x10 mins L4 x10 min L4 x10 mins L4 x10 mins L4 x10 mins   Bridges 5\" 2x10 5\" x20 5\" x20 5\" x20 5\" x20   Supine SLR flexion 2x10 ea 2x10 ea 2x10 ea 2x10 ea 2x10 ea   Sidelying hip abduction  2x10 ea 2x10 ea 2x10 ea 2x10 ea   Prone hip extension   2x10 ea X10 on R    Held 2* P! held   HS stretch   Supine w/strap  30\"x3 ea Supine w/strap  30\"x3 ea Supine w/strap  30\"x3 ea   Lateral step ups  6\" 2x10 ea 6\" 2x10 ea 6\" 2x10 ea 6\" 2x10 ea   Knee ext machine                Ther Activity        Mini squats with chair behind  x10 3\" 2x10 3\" 2x10 3\" 2x10   Mini lunges        Step ups  6\" 2x10 ea 6\" 2x10 ea 6\" 2x10 ea 6\" 2x10 ea   Gait Training                        Modalities                                   " rehabilitation facility

## 2024-02-15 ENCOUNTER — OFFICE VISIT (OUTPATIENT)
Dept: PHYSICAL THERAPY | Facility: CLINIC | Age: 79
End: 2024-02-15
Payer: COMMERCIAL

## 2024-02-15 DIAGNOSIS — M25.361 RIGHT KNEE BUCKLING: Primary | ICD-10-CM

## 2024-02-15 PROCEDURE — 97112 NEUROMUSCULAR REEDUCATION: CPT | Performed by: PHYSICAL THERAPIST

## 2024-02-15 PROCEDURE — 97530 THERAPEUTIC ACTIVITIES: CPT | Performed by: PHYSICAL THERAPIST

## 2024-02-15 PROCEDURE — 97110 THERAPEUTIC EXERCISES: CPT | Performed by: PHYSICAL THERAPIST

## 2024-02-15 NOTE — PROGRESS NOTES
"Daily Note  Today's date: 2/15/2024  Patient name: Gold Van  : 1945  MRN: [id number]  Referring provider: Richard Herron*  Dx:   Encounter Diagnosis     ICD-10-CM    1. Right knee buckling  M25.361                      Subjective: Patient offers no new complaints.       Objective: See treatment diary below      Assessment: Tolerated treatment well. Patient was challenged with addition of balance training. Patient demonstrated fatigue post treatment, exhibited good technique with therapeutic exercises, and would benefit from continued PT. Provided patient with written HEP today.       Plan: Continue per plan of care.  Progress treatment as tolerated.       Precautions: diabetic, osteopenia  POC exp 3/21/24  HEP access code [email or web address] - provided 2/15/24  Manuals 2/15 1/29 2/1 2/8 2/12                                   Neuro Re-Ed        SLS balance R  20\" x4  Light HHA  on // bars b/l UEs    NV   Dynamic balance: Tandem walking     NV   Dynamic balance: standing hip 3 way 1x10 ea b/l  Light HHA on // bars b/l UEs    NV   Dynamic balance: standing marches on foam X10 ea  Light HHA on // bars  B/l UEs       Dynamic balance: side stepping across foam                        Ther Ex        Nustep for LE strength and endurance L4 x10 mins L4 x10 min L4 x10 mins L4 x10 mins L4 x10 mins   Bridges 5\" x20 5\" x20 5\" x20 5\" x20 5\" x20   Supine SLR flexion 2x10 ea 2x10 ea 2x10 ea 2x10 ea 2x10 ea   Sidelying hip abduction 2x10 ea 2x10 ea 2x10 ea 2x10 ea 2x10 ea   Prone hip extension held  2x10 ea X10 on R    Held 2* P! held   HS stretch standing  Supine w/strap  30\"x3 ea Supine w/strap  30\"x3 ea Supine w/strap  30\"x3 ea   Lateral step ups 6\" x20 ea 6\" 2x10 ea 6\" 2x10 ea 6\" 2x10 ea 6\" 2x10 ea   Knee ext machine                Ther Activity        Mini squats with chair behind 3\" 2x10  x10 3\" 2x10 3\" 2x10 3\" 2x10   Mini lunges        Step ups 6\" 2x10 ea 6\" 2x10 ea 6\" 2x10 ea 6\" 2x10 ea 6\" 2x10 ea   Gait Training "                        Modalities

## 2024-02-19 ENCOUNTER — OFFICE VISIT (OUTPATIENT)
Dept: PHYSICAL THERAPY | Facility: CLINIC | Age: 79
End: 2024-02-19
Payer: COMMERCIAL

## 2024-02-19 DIAGNOSIS — M25.361 RIGHT KNEE BUCKLING: Primary | ICD-10-CM

## 2024-02-19 PROCEDURE — 97530 THERAPEUTIC ACTIVITIES: CPT

## 2024-02-19 PROCEDURE — 97110 THERAPEUTIC EXERCISES: CPT

## 2024-02-19 PROCEDURE — 97112 NEUROMUSCULAR REEDUCATION: CPT

## 2024-02-19 NOTE — PROGRESS NOTES
"Daily Note     Today's date: 2024  Patient name: Gold Van  : 1945  MRN: [id number]  Referring provider: Richard Herron*  Dx:   Encounter Diagnosis     ICD-10-CM    1. Right knee buckling  M25.361                      Subjective: Pt reported L hip pain prior to PT today. He denied any resent knee buckling and falls.       Objective: See treatment diary below      Assessment: Tolerated treatment well. Patient demonstrated fatigue post treatment, exhibited good technique with therapeutic exercises, and would benefit from continued PT      Plan: Continue per plan of care.  Progress treatment as tolerated.       Precautions: diabetic, osteopenia  POC exp 3/21/24  HEP access code [email or web address] - provided 2/15/24  Manuals 2/15 2/19 2/1 2/8 2/12                                   Neuro Re-Ed        SLS balance R  20\" x4  Light HHA  on // bars b/l UEs R  20\" x4  Light HHA  on // bars b/l UEs   NV   Dynamic balance: Tandem walking     NV   Dynamic balance: standing hip 3 way 1x10 ea b/l  Light HHA on // bars b/l UEs X10 ea  Light HHA on // bars  B/l UEs   NV   Dynamic balance: standing marches on foam X10 ea  Light HHA on // bars  B/l UEs X10 ea  Light HHA on // bars  B/l UEs      Dynamic balance: side stepping across foam                        Ther Ex        Nustep for LE strength and endurance L4 x10 mins L4 x10 min L4 x10 mins L4 x10 mins L4 x10 mins   Bridges 5\" x20 5\" x20 5\" x20 5\" x20 5\" x20   Supine SLR flexion 2x10 ea 2x10 ea 2x10 ea 2x10 ea 2x10 ea   Sidelying hip abduction 2x10 ea 2x10 ea 2x10 ea 2x10 ea 2x10 ea   Prone hip extension held -- 2x10 ea X10 on R    Held 2* P! held   HS stretch standing Supine w/strap  30\"x3 ea Supine w/strap  30\"x3 ea Supine w/strap  30\"x3 ea Supine w/strap  30\"x3 ea   Lateral step ups 6\" x20 ea 6\" 2x10 ea 6\" 2x10 ea 6\" 2x10 ea 6\" 2x10 ea   Knee ext machine                Ther Activity        Mini squats with chair behind 3\" 2x10  3\" 2x10 3\" 2x10 3\" 2x10 3\" 2x10 " "  Mini lunges        Step ups 6\" 2x10 ea 6\" 2x10 ea 6\" 2x10 ea 6\" 2x10 ea 6\" 2x10 ea   Gait Training                        Modalities                                       "

## 2024-02-22 ENCOUNTER — OFFICE VISIT (OUTPATIENT)
Dept: PHYSICAL THERAPY | Facility: CLINIC | Age: 79
End: 2024-02-22
Payer: COMMERCIAL

## 2024-02-22 DIAGNOSIS — M25.361 RIGHT KNEE BUCKLING: Primary | ICD-10-CM

## 2024-02-22 PROCEDURE — 97110 THERAPEUTIC EXERCISES: CPT | Performed by: PHYSICAL THERAPIST

## 2024-02-22 PROCEDURE — 97112 NEUROMUSCULAR REEDUCATION: CPT | Performed by: PHYSICAL THERAPIST

## 2024-02-22 NOTE — PROGRESS NOTES
"Daily Note     Today's date: 2024  Patient name: Gold Van  : 1945  MRN: [id number]  Referring provider: Richard Herron*  Dx:   Encounter Diagnosis     ICD-10-CM    1. Right knee buckling  M25.361                      Subjective: Patient reports he is not feeling great today, thinks he is fighting off sickness. No new complaints regarding his leg.       Objective: See treatment diary below      Assessment: Able to progress with addition of light resistance. Tolerated treatment well. Patient demonstrated fatigue post treatment, exhibited good technique with therapeutic exercises, and would benefit from continued PT      Plan: Continue per plan of care.  Progress treatment as tolerated.       Precautions: diabetic, osteopenia  POC exp 3/21/24  HEP access code [email or web address] - provided 2/15/24  Manuals 2/15 2/19 2/22 2/8 2/12                                   Neuro Re-Ed        SLS balance R  20\" x4  Light HHA  on // bars b/l UEs R  20\" x4  Light HHA  on // bars b/l UEs Resume NV  NV   Dynamic balance: Tandem walking     NV   Dynamic balance: standing hip 3 way 1x10 ea b/l  Light HHA on // bars b/l UEs X10 ea  Light HHA on // bars  B/l UEs X10 ea   Light HHA on // bars  B/l UEs  NV   Dynamic balance: standing marches on foam X10 ea  Light HHA on // bars  B/l UEs X10 ea  Light HHA on // bars  B/l UEs X10 ea   Light HHA  On // bars  B/l UEs     Dynamic balance: side stepping across foam                        Ther Ex        Nustep for LE strength and endurance L4 x10 mins L4 x10 min L5 x10 mins L4 x10 mins L4 x10 mins   Bridges 5\" x20 5\" x20 5\" x20 5\" x20 5\" x20   Supine SLR flexion 2x10 ea 2x10 ea 1# 2x10 ea 2x10 ea 2x10 ea   Sidelying hip abduction 2x10 ea 2x10 ea 1# 2x10 ea 2x10 ea 2x10 ea   Prone hip extension held --  X10 on R    Held 2* P! held   HS stretch standing Supine w/strap  30\"x3 ea Supine w/strap  30\"x3 ea Supine w/strap  30\"x3 ea Supine w/strap  30\"x3 ea   Lateral step ups 6\" x20 ea " "6\" 2x10 ea 6\" 2x10 ea  6\" 2x10 ea 6\" 2x10 ea   Knee ext machine                Ther Activity        Mini squats with chair behind 3\" 2x10  3\" 2x10 3\" 2x10 3\" 2x10 3\" 2x10   Mini lunges        Step ups 6\" 2x10 ea 6\" 2x10 ea 6\" 2x10 ea 6\" 2x10 ea 6\" 2x10 ea   Gait Training                        Modalities                                         "

## 2024-02-23 ENCOUNTER — APPOINTMENT (OUTPATIENT)
Age: 79
End: 2024-02-23
Payer: COMMERCIAL

## 2024-02-23 DIAGNOSIS — I10 HYPERTENSION, UNSPECIFIED TYPE: ICD-10-CM

## 2024-02-23 DIAGNOSIS — M25.361 RIGHT KNEE BUCKLING: ICD-10-CM

## 2024-02-23 DIAGNOSIS — Z79.899 ENCOUNTER FOR LONG-TERM (CURRENT) USE OF OTHER MEDICATIONS: ICD-10-CM

## 2024-02-23 DIAGNOSIS — E78.5 HYPERLIPIDEMIA, UNSPECIFIED HYPERLIPIDEMIA TYPE: ICD-10-CM

## 2024-02-23 DIAGNOSIS — E55.9 VITAMIN D DEFICIENCY: ICD-10-CM

## 2024-02-23 DIAGNOSIS — E11.69 TYPE 2 DIABETES MELLITUS WITH OTHER SPECIFIED COMPLICATION, UNSPECIFIED WHETHER LONG TERM INSULIN USE (HCC): ICD-10-CM

## 2024-02-23 LAB
25(OH)D3 SERPL-MCNC: 31 NG/ML (ref 30–100)
ALBUMIN SERPL BCP-MCNC: 3.7 G/DL (ref 3.5–5)
ALP SERPL-CCNC: 69 U/L (ref 34–104)
ALT SERPL W P-5'-P-CCNC: 12 U/L (ref 7–52)
ANION GAP SERPL CALCULATED.3IONS-SCNC: 11 MMOL/L
AST SERPL W P-5'-P-CCNC: 13 U/L (ref 13–39)
BILIRUB SERPL-MCNC: 0.52 MG/DL (ref 0.2–1)
BUN SERPL-MCNC: 15 MG/DL (ref 5–25)
CALCIUM SERPL-MCNC: 9.4 MG/DL (ref 8.4–10.2)
CHLORIDE SERPL-SCNC: 98 MMOL/L (ref 96–108)
CHOLEST SERPL-MCNC: 196 MG/DL
CO2 SERPL-SCNC: 23 MMOL/L (ref 21–32)
CREAT SERPL-MCNC: 1.17 MG/DL (ref 0.6–1.3)
CREAT UR-MCNC: 98.7 MG/DL
ERYTHROCYTE [DISTWIDTH] IN BLOOD BY AUTOMATED COUNT: 13.4 % (ref 11.6–15.1)
EST. AVERAGE GLUCOSE BLD GHB EST-MCNC: 169 MG/DL
GFR SERPL CREATININE-BSD FRML MDRD: 59 ML/MIN/1.73SQ M
GLUCOSE P FAST SERPL-MCNC: 130 MG/DL (ref 65–99)
HBA1C MFR BLD: 7.5 %
HCT VFR BLD AUTO: 38.4 % (ref 36.5–49.3)
HDLC SERPL-MCNC: 39 MG/DL
HGB BLD-MCNC: 12.4 G/DL (ref 12–17)
LDLC SERPL CALC-MCNC: 141 MG/DL (ref 0–100)
MCH RBC QN AUTO: 29.5 PG (ref 26.8–34.3)
MCHC RBC AUTO-ENTMCNC: 32.3 G/DL (ref 31.4–37.4)
MCV RBC AUTO: 91 FL (ref 82–98)
MICROALBUMIN UR-MCNC: 292.4 MG/L
MICROALBUMIN/CREAT 24H UR: 296 MG/G CREATININE (ref 0–30)
NONHDLC SERPL-MCNC: 157 MG/DL
PLATELET # BLD AUTO: 353 THOUSANDS/UL (ref 149–390)
PMV BLD AUTO: 9.9 FL (ref 8.9–12.7)
POTASSIUM SERPL-SCNC: 4.2 MMOL/L (ref 3.5–5.3)
PROT SERPL-MCNC: 7 G/DL (ref 6.4–8.4)
RBC # BLD AUTO: 4.2 MILLION/UL (ref 3.88–5.62)
SODIUM SERPL-SCNC: 132 MMOL/L (ref 135–147)
T4 FREE SERPL-MCNC: 0.93 NG/DL (ref 0.61–1.12)
TRIGL SERPL-MCNC: 79 MG/DL
TSH SERPL DL<=0.05 MIU/L-ACNC: 1.97 UIU/ML (ref 0.45–4.5)
WBC # BLD AUTO: 10.4 THOUSAND/UL (ref 4.31–10.16)

## 2024-02-23 PROCEDURE — 80053 COMPREHEN METABOLIC PANEL: CPT

## 2024-02-23 PROCEDURE — 84443 ASSAY THYROID STIM HORMONE: CPT

## 2024-02-23 PROCEDURE — 82043 UR ALBUMIN QUANTITATIVE: CPT

## 2024-02-23 PROCEDURE — 36415 COLL VENOUS BLD VENIPUNCTURE: CPT

## 2024-02-23 PROCEDURE — 83036 HEMOGLOBIN GLYCOSYLATED A1C: CPT

## 2024-02-23 PROCEDURE — 85027 COMPLETE CBC AUTOMATED: CPT

## 2024-02-23 PROCEDURE — 82570 ASSAY OF URINE CREATININE: CPT

## 2024-02-23 PROCEDURE — 82306 VITAMIN D 25 HYDROXY: CPT

## 2024-02-23 PROCEDURE — 80061 LIPID PANEL: CPT

## 2024-02-23 PROCEDURE — 84439 ASSAY OF FREE THYROXINE: CPT

## 2024-02-26 ENCOUNTER — OFFICE VISIT (OUTPATIENT)
Dept: PHYSICAL THERAPY | Facility: CLINIC | Age: 79
End: 2024-02-26
Payer: COMMERCIAL

## 2024-02-26 DIAGNOSIS — M25.361 RIGHT KNEE BUCKLING: Primary | ICD-10-CM

## 2024-02-26 PROCEDURE — 97110 THERAPEUTIC EXERCISES: CPT

## 2024-02-26 PROCEDURE — 97112 NEUROMUSCULAR REEDUCATION: CPT

## 2024-02-26 NOTE — PROGRESS NOTES
"Daily Note     Today's date: 2024  Patient name: Gold Van  : 1945  MRN: [id number]  Referring provider: Richard Herron*  Dx:   Encounter Diagnosis     ICD-10-CM    1. Right knee buckling  M25.361                      Subjective: Pt denied any recent R knee buckling. He did attempt his HEP this weekend. He experienced \"R knee clicking\" during his HEP. This clicking is not symptomatic.       Objective: See treatment diary below      Assessment: Tolerated treatment well. Advised pt to continue to perform his HEP, unless the R knee clicking becomes painful. He verbally agrees. Patient demonstrated fatigue post treatment, exhibited good technique with therapeutic exercises, and would benefit from continued PT      Plan: Continue per plan of care.  Progress treatment as tolerated.       Precautions: diabetic, osteopenia  POC exp 3/21/24  HEP access code [email or web address] - provided 2/15/24  Manuals 2/15 2/19 2/22 2/26 2/12                                   Neuro Re-Ed        SLS balance R  20\" x4  Light HHA  on // bars b/l UEs R  20\" x4  Light HHA  on // bars b/l UEs Resume NV R  20\" x4  Light HHA  on // bars b/l UEs NV   Dynamic balance: Tandem walking     NV   Dynamic balance: standing hip 3 way 1x10 ea b/l  Light HHA on // bars b/l UEs X10 ea  Light HHA on // bars  B/l UEs X10 ea   Light HHA on // bars  B/l UEs X10 ea   Light HHA on // bars  B/l UEs NV   Dynamic balance: standing marches on foam X10 ea  Light HHA on // bars  B/l UEs X10 ea  Light HHA on // bars  B/l UEs X10 ea   Light HHA  On // bars  B/l UEs X10 ea   Light HHA  On // bars  B/l UEs    Dynamic balance: side stepping across foam                        Ther Ex        Nustep for LE strength and endurance L4 x10 mins L4 x10 min L5 x10 mins L5 x10 mins L4 x10 mins   Bridges 5\" x20 5\" x20 5\" x20 5\" x20 5\" x20   Supine SLR flexion 2x10 ea 2x10 ea 1# 2x10 ea 1# 2x10 ea 2x10 ea   Sidelying hip abduction 2x10 ea 2x10 ea 1# 2x10 ea 1# 2x10 ea " "2x10 ea   Prone hip extension held --   held   HS stretch standing Supine w/strap  30\"x3 ea Supine w/strap  30\"x3 ea Supine w/strap  30\"x3 ea Supine w/strap  30\"x3 ea   Lateral step ups 6\" x20 ea 6\" 2x10 ea 6\" 2x10 ea  6\" 2x10 ea 6\" 2x10 ea   Knee ext machine                Ther Activity        Mini squats with chair behind 3\" 2x10  3\" 2x10 3\" 2x10 3\" 2x10 3\" 2x10   Mini lunges        Step ups 6\" 2x10 ea 6\" 2x10 ea 6\" 2x10 ea 6\" 2x10 ea 6\" 2x10 ea   Gait Training                        Modalities                                           "

## 2024-02-29 ENCOUNTER — OFFICE VISIT (OUTPATIENT)
Dept: PHYSICAL THERAPY | Facility: CLINIC | Age: 79
End: 2024-02-29
Payer: COMMERCIAL

## 2024-02-29 DIAGNOSIS — M25.361 RIGHT KNEE BUCKLING: Primary | ICD-10-CM

## 2024-02-29 PROCEDURE — 97110 THERAPEUTIC EXERCISES: CPT | Performed by: PHYSICAL THERAPIST

## 2024-02-29 NOTE — PROGRESS NOTES
PT Re-Evaluation  and PT Discharge    Today's date: 2024  Patient name: Gold Van  : 1945  MRN: [id number]  Referring provider: Richard Herron*  Dx:   Encounter Diagnosis     ICD-10-CM    1. Right knee buckling  M25.361                      Assessment  Assessment details: Gold Van has been compliant with PT services. He has attended a total of 10 visits of OPPT. He has demonstrated increased strength, increased stability, increased range of motion, and increased activity tolerance since starting physical therapy services. He reports an overall improvement of 70% thus far.  At this time, patient has achieved their maximum functional benefit from skilled physical therapy services and will be discharged to their HEP.  Patient is in agreement with the plan of care.  As a result, patient is discharged from physical therapy    Understanding of Dx/Px/POC: good   Prognosis: good    Goals  STGs  1) In 4 weeks patient will demonstrate 1/2 grade improvement in hip strength (abduction and extensions) - MET  2) In 4 weeks patient will demonstrate 10 deg improvement in HS flexibility on R - not met  3) In 4 weeks patient will demonstrate improvement in SLS balance, able to maintain SLS for 5 sec or more on each LE - not met    LTGs  1) In 4-8 weeks patient will report no falls or near falls - MET  2) in 4-8 weeks patient will report no difficulty with walking - MET  3) In 4-8 weeks patient will report no difficulty on stairs or curbs - MET  4) In 4-8 weeks patient will demonstrate ability to transfer from kneeling on floor to standing without holding on with Ues - improving    Plan  Plan details: DC to [facility]  Referral necessary: No  Treatment plan discussed with: patient        Subjective Evaluation    History of Present Illness  Mechanism of injury: -patient has c/o R knee buckling on him, about a month ago he fell due to the knee giving out of him  -saw orthopedic (Dr. Herron) and was referred  "to PT for strengthening and stabilization exercises  -x-ray shows OA, but no discussion was had about a knee replacement  -no injections  -patient does not have complaints of pain, primary concern is instability  -h/o R knee surgery when he was 16 y/o, had cartilage surgery  -his concern is that he likes to be active and go for walks, but he cannot keep up with it anymore  -walks with a walking stick  -at times furniture surfs in his home    Re-evaluation 2/29/24:  -still no pain reported  -feels like the PT helped him  -feels 70% improvement  -has not had any episodes of knee buckling since starting PT  -feels stronger and more stable overall   -feels his balance has improved since starting PT  Patient Goals  Patient goals for therapy: improved balance, increased strength and return to sport/leisure activities  Patient goal: transition from PT to a HEP/gym routine, gain strength and stability in his leg  Pain  No pain reported    Social Support  Stairs in house: yes (uses railing)   Lives in: multiple-level home  Lives with: spouse    Employment status: not working    Diagnostic Tests  X-ray: normal (\"arthritis\")  Treatments  No previous or current treatments        Objective     Active Range of Motion   Left Knee   Normal active range of motion    Right Knee   Normal active range of motion    Additional Active Range of Motion Details  HS 90-90: lacks 35 deg active knee ext b/l    Strength/Myotome Testing     Left Hip   Planes of Motion   Flexion: 4+  Extension: 4  Abduction: 4  External rotation: 4+  Internal rotation: 4+    Isolated Muscles   Gluteus evelia: 4+    Right Hip   Planes of Motion   Flexion: 4+  Extension: 4  Abduction: 4  External rotation: 4+  Internal rotation: 4+    Isolated Muscles   Gluteus maximums: 4+    Left Knee   Flexion: 5  Extension: 5  Quadriceps contraction: good    Right Knee   Flexion: 5  Extension: 5  Quadriceps contraction: good    Functional Assessment        Single Leg Stance - " "Eyes Open   Left  Trial 1: 1 seconds  Trial 2: 2 seconds  Trial 3: 1 seconds  Average: 1.33 seconds     Right  Trial 1: 2 seconds  Trial 2: 2 seconds  Trial 3: 1 seconds  Average: 1.67 seconds              Precautions: diabetic, osteopenia  POC exp 3/21/24  Manuals 2/15 2/19 2/22 2/26 2/29                                   Neuro Re-Ed        SLS balance R  20\" x4  Light HHA  on // bars b/l UEs R  20\" x4  Light HHA  on // bars b/l UEs Resume NV R  20\" x4  Light HHA  on // bars b/l UEs    Dynamic balance: Tandem walking        Dynamic balance: standing hip 3 way 1x10 ea b/l  Light HHA on // bars b/l UEs X10 ea  Light HHA on // bars  B/l UEs X10 ea   Light HHA on // bars  B/l UEs X10 ea   Light HHA on // bars  B/l UEs    Dynamic balance: standing marches on foam X10 ea  Light HHA on // bars  B/l UEs X10 ea  Light HHA on // bars  B/l UEs X10 ea   Light HHA  On // bars  B/l UEs X10 ea   Light HHA  On // bars  B/l UEs    Dynamic balance: side stepping across foam                        Ther Ex        Nustep for LE strength and endurance L4 x10 mins L4 x10 min L5 x10 mins L5 x10 mins    Bridges 5\" x20 5\" x20 5\" x20 5\" x20 10\"x20   Supine SLR flexion 2x10 ea 2x10 ea 1# 2x10 ea 1# 2x10 ea 1# 2x10 ea   Sidelying hip abduction 2x10 ea 2x10 ea 1# 2x10 ea 1# 2x10 ea 1# 2x10 ea   Prone hip extension held --      HS stretch standing Supine w/strap  30\"x3 ea Supine w/strap  30\"x3 ea Supine w/strap  30\"x3 ea    Lateral step ups 6\" x20 ea 6\" 2x10 ea 6\" 2x10 ea  6\" 2x10 ea    Knee ext machine                Ther Activity        Mini squats with chair behind 3\" 2x10  3\" 2x10 3\" 2x10 3\" 2x10    Mini lunges     1x10 ea   Step ups 6\" 2x10 ea 6\" 2x10 ea 6\" 2x10 ea 6\" 2x10 ea    Gait Training                        Modalities                           Access Code: [email or web address]  URL: [email or web address]  Date: 02/29/2024  Prepared by: Alice Muniz    Exercises  - Supine Hamstring Stretch with Strap  - 1 x daily - 7 x weekly " - 3 reps - 30 sec hold  - Standing Single Leg Stance with Counter Support  - 1 x daily - 7 x weekly - 4 reps - 20 sec hold  - Mini Squat with Counter Support  - 1 x daily - 3 x weekly - 2 sets - 10 reps - 3 sec hold  - Step Up  - 1 x daily - 3 x weekly - 2 sets - 10 reps  - Lateral Step Up  - 1 x daily - 3 x weekly - 2 sets - 10 reps  - Standing Hip Flexion with Counter Support  - 1 x daily - 3 x weekly - 2 sets - 10 reps  - Standing Hip Abduction with Counter Support  - 1 x daily - 3 x weekly - 2 sets - 10 reps  - Standing Hip Extension with Counter Support  - 1 x daily - 3 x weekly - 2 sets - 10 reps  - Supine Bridge  - 1 x daily - 3 x weekly - 20 reps - 10 sec hold  - Active Straight Leg Raise with Quad Set  - 1 x daily - 3 x weekly - 2 sets - 10 reps  - Sidelying Hip Abduction  - 1 x daily - 3 x weekly - 2 sets - 10 reps  - Standing Partial Lunge  - 1 x daily - 3 x weekly - 2 sets - 10 reps

## 2024-02-29 NOTE — LETTER
2024    Richard Herron MD  [address] [place] [id number]    Patient: Gold Van   YOB: 1945   Date of Visit: 2024     Encounter Diagnosis     ICD-10-CM    1. Right knee buckling  M25.361           Dear Dr. Herron:    Thank you for your recent referral of Gold Van. Please review the attached evaluation summary from Gold's recent visit.     Please verify that you agree with the plan of care by signing the attached order.     If you have any questions or concerns, please do not hesitate to call.     I sincerely appreciate the opportunity to share in the care of one of your patients and hope to have another opportunity to work with you in the near future.       Sincerely,    Alice Muniz, PT      Referring Provider:      I certify that I have read the below Plan of Care and certify the need for these services furnished under this plan of treatment while under my care.                    Richard Herron MD  [address] [place] [phone]  Via Fax: [phone]          PT Re-Evaluation  and PT Discharge    Today's date: 2024  Patient name: Gold Van  : 1945  MRN: [id number]  Referring provider: Richard Herron*  Dx:   Encounter Diagnosis     ICD-10-CM    1. Right knee buckling  M25.361                      Assessment  Assessment details: Gold Van has been compliant with PT services. He has attended a total of 10 visits of OPPT. He has demonstrated increased strength, increased stability, increased range of motion, and increased activity tolerance since starting physical therapy services. He reports an overall improvement of 70% thus far.  At this time, patient has achieved their maximum functional benefit from skilled physical therapy services and will be discharged to their [facility].  Patient is in agreement with the plan of care.  As a result, patient is discharged from physical therapy    Understanding of  Dx/Px/POC: good   Prognosis: good    Goals  STGs  1) In 4 weeks patient will demonstrate 1/2 grade improvement in hip strength (abduction and extensions) - MET  2) In 4 weeks patient will demonstrate 10 deg improvement in HS flexibility on R - not met  3) In 4 weeks patient will demonstrate improvement in SLS balance, able to maintain SLS for 5 sec or more on each LE - not met    LTGs  1) In 4-8 weeks patient will report no falls or near falls - MET  2) in 4-8 weeks patient will report no difficulty with walking - MET  3) In 4-8 weeks patient will report no difficulty on stairs or curbs - MET  4) In 4-8 weeks patient will demonstrate ability to transfer from kneeling on floor to standing without holding on with Ues - improving    Plan  Plan details: DC to HEP  Referral necessary: No  Treatment plan discussed with: patient        Subjective Evaluation    History of Present Illness  Mechanism of injury: -patient has c/o R knee buckling on him, about a month ago he fell due to the knee giving out of him  -saw orthopedic (Dr. Herron) and was referred to PT for strengthening and stabilization exercises  -x-ray shows OA, but no discussion was had about a knee replacement  -no injections  -patient does not have complaints of pain, primary concern is instability  -h/o R knee surgery when he was 16 y/o, had cartilage surgery  -his concern is that he likes to be active and go for walks, but he cannot keep up with it anymore  -walks with a walking stick  -at times furniture surfs in his home    Re-evaluation 2/29/24:  -still no pain reported  -feels like the PT helped him  -feels 70% improvement  -has not had any episodes of knee buckling since starting PT  -feels stronger and more stable overall   -feels his balance has improved since starting PT  Patient Goals  Patient goals for therapy: improved balance, increased strength and return to sport/leisure activities  Patient goal: transition from PT to a HEP/gym routine, gain  "strength and stability in his leg  Pain  No pain reported    Social Support  Stairs in house: yes (uses railing)   Lives in: multiple-level home  Lives with: spouse    Employment status: not working    Diagnostic Tests  X-ray: normal (\"arthritis\")  Treatments  No previous or current treatments        Objective     Active Range of Motion   Left Knee   Normal active range of motion    Right Knee   Normal active range of motion    Additional Active Range of Motion Details  HS 90-90: lacks 35 deg active knee ext b/l    Strength/Myotome Testing     Left Hip   Planes of Motion   Flexion: 4+  Extension: 4  Abduction: 4  External rotation: 4+  Internal rotation: 4+    Isolated Muscles   Gluteus evelia: 4+    Right Hip   Planes of Motion   Flexion: 4+  Extension: 4  Abduction: 4  External rotation: 4+  Internal rotation: 4+    Isolated Muscles   Gluteus maximums: 4+    Left Knee   Flexion: 5  Extension: 5  Quadriceps contraction: good    Right Knee   Flexion: 5  Extension: 5  Quadriceps contraction: good    Functional Assessment        Single Leg Stance - Eyes Open   Left  Trial 1: 1 seconds  Trial 2: 2 seconds  Trial 3: 1 seconds  Average: 1.33 seconds     Right  Trial 1: 2 seconds  Trial 2: 2 seconds  Trial 3: 1 seconds  Average: 1.67 seconds              Precautions: diabetic, osteopenia  POC exp 3/21/24  Manuals 2/15 2/19 2/22 2/26 2/29                                   Neuro Re-Ed        SLS balance R  20\" x4  Light HHA  on // bars b/l UEs R  20\" x4  Light HHA  on // bars b/l UEs Resume NV R  20\" x4  Light HHA  on // bars b/l UEs    Dynamic balance: Tandem walking        Dynamic balance: standing hip 3 way 1x10 ea b/l  Light HHA on // bars b/l UEs X10 ea  Light HHA on // bars  B/l UEs X10 ea   Light HHA on // bars  B/l UEs X10 ea   Light HHA on // bars  B/l UEs    Dynamic balance: standing marches on foam X10 ea  Light HHA on // bars  B/l UEs X10 ea  Light HHA on // bars  B/l UEs X10 ea   Light HHA  On // bars  B/l UEs " "X10 ea   Light HHA  On // bars  B/l UEs    Dynamic balance: side stepping across foam                        Ther Ex        Nustep for LE strength and endurance L4 x10 mins L4 x10 min L5 x10 mins L5 x10 mins    Bridges 5\" x20 5\" x20 5\" x20 5\" x20 10\"x20   Supine SLR flexion 2x10 ea 2x10 ea 1# 2x10 ea 1# 2x10 ea 1# 2x10 ea   Sidelying hip abduction 2x10 ea 2x10 ea 1# 2x10 ea 1# 2x10 ea 1# 2x10 ea   Prone hip extension held --      HS stretch standing Supine w/strap  30\"x3 ea Supine w/strap  30\"x3 ea Supine w/strap  30\"x3 ea    Lateral step ups 6\" x20 ea 6\" 2x10 ea 6\" 2x10 ea  6\" 2x10 ea    Knee ext machine                Ther Activity        Mini squats with chair behind 3\" 2x10  3\" 2x10 3\" 2x10 3\" 2x10    Mini lunges     1x10 ea   Step ups 6\" 2x10 ea 6\" 2x10 ea 6\" 2x10 ea 6\" 2x10 ea    Gait Training                        Modalities                           Access Code: [email or web address]  URL: [email or web address]  Date: 02/29/2024  Prepared by: Alice Muniz    Exercises  - Supine Hamstring Stretch with Strap  - 1 x daily - 7 x weekly - 3 reps - 30 sec hold  - Standing Single Leg Stance with Counter Support  - 1 x daily - 7 x weekly - 4 reps - 20 sec hold  - Mini Squat with Counter Support  - 1 x daily - 3 x weekly - 2 sets - 10 reps - 3 sec hold  - Step Up  - 1 x daily - 3 x weekly - 2 sets - 10 reps  - Lateral Step Up  - 1 x daily - 3 x weekly - 2 sets - 10 reps  - Standing Hip Flexion with Counter Support  - 1 x daily - 3 x weekly - 2 sets - 10 reps  - Standing Hip Abduction with Counter Support  - 1 x daily - 3 x weekly - 2 sets - 10 reps  - Standing Hip Extension with Counter Support  - 1 x daily - 3 x weekly - 2 sets - 10 reps  - Supine Bridge  - 1 x daily - 3 x weekly - 20 reps - 10 sec hold  - Active Straight Leg Raise with Quad Set  - 1 x daily - 3 x weekly - 2 sets - 10 reps  - Sidelying Hip Abduction  - 1 x daily - 3 x weekly - 2 sets - 10 reps  - Standing Partial Lunge  - 1 x daily - 3 x " weekly - 2 sets - 10 reps

## 2024-03-18 ENCOUNTER — CONSULT (OUTPATIENT)
Dept: SURGERY | Facility: CLINIC | Age: 79
End: 2024-03-18
Payer: COMMERCIAL

## 2024-03-18 VITALS
OXYGEN SATURATION: 95 % | HEART RATE: 72 BPM | TEMPERATURE: 98.4 F | BODY MASS INDEX: 23.24 KG/M2 | HEIGHT: 72 IN | SYSTOLIC BLOOD PRESSURE: 138 MMHG | DIASTOLIC BLOOD PRESSURE: 74 MMHG | WEIGHT: 171.6 LBS

## 2024-03-18 DIAGNOSIS — K40.20 NON-RECURRENT BILATERAL INGUINAL HERNIA WITHOUT OBSTRUCTION OR GANGRENE: Primary | ICD-10-CM

## 2024-03-18 DIAGNOSIS — E11.8 TYPE 2 DIABETES MELLITUS WITH COMPLICATION, WITHOUT LONG-TERM CURRENT USE OF INSULIN (HCC): Chronic | ICD-10-CM

## 2024-03-18 DIAGNOSIS — L29.9 PRURITUS: ICD-10-CM

## 2024-03-18 PROCEDURE — 99203 OFFICE O/P NEW LOW 30 MIN: CPT | Performed by: SURGERY

## 2024-03-18 NOTE — ASSESSMENT & PLAN NOTE
- chronic pruritus  - has been receiving IM kenalog every 3 months  - was prescribed about 2 week course of prednisone 20mg  - dermatology appointment scheduled for 3/19    Plan:  - discussed the effect steroids have on wound healing. The patient's major concern is this rash that acutely developed yesterday. He would like to focus on treatment of this rash before considering surgery.   - consider establishing care with rheumatology for further work up

## 2024-03-18 NOTE — ASSESSMENT & PLAN NOTE
Lab Results   Component Value Date    HGBA1C 7.5 (H) 02/23/2024     - managed on glipizide and metformin    Plan:  - cont management per PCP  - discussed importance of good glycemic control in regard to wound healing, increased recurrence rate with poorly controlled diabetes

## 2024-03-18 NOTE — PROGRESS NOTES
Assessment/Plan:    Type 2 diabetes mellitus with complication, without long-term current use of insulin (HCC)    Lab Results   Component Value Date    HGBA1C 7.5 (H) 02/23/2024     - managed on glipizide and metformin    Plan:  - cont management per PCP  - discussed importance of good glycemic control in regard to wound healing, increased recurrence rate with poorly controlled diabetes    Non-recurrent bilateral inguinal hernia without obstruction or gangrene  - noticed for the past 1-2 months  - denies having any obstructive symptoms--denies nausea, vomiting, obstipation  - soft reducible masses palpated in bilateral inguinal canals, right side is larger than left  - asymptomatic--denies pain, pulling sensation, voiding appropriately  - no previous abdominal surgeries  - not a current smoker    Plan:  - discuss watchful waiting vs surgical repair. Discussed the risks of watchful waiting including but not limited to increase in hernia size, bowel incarceration/strangulation, obstruction. The patient would like to focus on the acute skin issues he is experiencing and revisit surgical repair of his hernia at a later time. The patient understands the return precautions that were discussed and will seek medical evaluation in the ED if they occur.    Pruritus  - chronic pruritus  - has been receiving IM kenalog every 3 months  - was prescribed about 2 week course of prednisone 20mg  - dermatology appointment scheduled for 3/19    Plan:  - discussed the effect steroids have on wound healing. The patient's major concern is this rash that acutely developed yesterday. He would like to focus on treatment of this rash before considering surgery.   - consider establishing care with rheumatology for further work up     Diagnoses and all orders for this visit:    Non-recurrent bilateral inguinal hernia without obstruction or gangrene    Type 2 diabetes mellitus with complication, without long-term current use of insulin  (HCC)    Pruritus          Labs reviewed by me:   2/23/24 hgb A1C  2/23/24 CMP  2/23/24 CBC    Images reviewed by me:   3/24/2021 CT CAP    Subjective:      Patient ID: Gold Van is a 78 y.o. male.    77yo M with right inguinal hernia. Noticed the hernia about 1-2 months ago, but may have had it for longer. Does not cause any pain, pulling sensations, denies any obstructive symptoms. Voiding normally. He was at his PCP for a different issue and brought up the lump in his right groin then was referred for evaluation. He did notice a smaller hernia on the left side that is also not causing any issues. PMHx T2DM (last A1c 7.5%), HTN, HLD. No previous abdominal surgeries. He quit smoking several years ago. He has a skin rash that is the most concerning issue to him at this time. It's an acute on chronic presentation and he was prescribed 2 weeks of prednisone for symptomatic relief. He has an appointment set up with dermatology tomorrow.        The following portions of the patient's history were reviewed and updated as appropriate: He  has a past medical history of Asthma, Diabetes mellitus (HCC), Environmental allergies, Hyperlipidemia, Hypertension, Macular degeneration (07/13/2020), Orthostatic hypotension, Osteopenia, and Sinusitis.  He   Patient Active Problem List    Diagnosis Date Noted    Non-recurrent bilateral inguinal hernia without obstruction or gangrene 03/18/2024    Pruritus 03/18/2024    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 06/16/2021    Hyponatremia 03/24/2021    Pulmonary nodule 03/24/2021    Type 2 diabetes mellitus with complication, without long-term current use of insulin (HCC) 10/23/2017    Essential hypertension 10/23/2017    Asthma 10/23/2017    Hyperlipidemia 10/23/2017     He  has a past surgical history that includes Knee cartilage surgery (Right, 1964); Colonoscopy; Vasectomy; and [place] tooth extraction.  His family history includes Asthma in his brother and  mother; Cancer in his brother and father; Emphysema in his mother.  He  reports that he has quit smoking. He has never used smokeless tobacco. He reports that he does not drink alcohol and does not use drugs.  Current Outpatient Medications   Medication Sig Dispense Refill    albuterol (PROVENTIL HFA,VENTOLIN HFA) 90 mcg/act inhaler Ventolin HFA 90 mcg/actuation aerosol inhaler      amLODIPine (NORVASC) 5 mg tablet Take 5 mg by mouth 2 (two) times a day      atenolol (TENORMIN) 25 mg tablet Take 25 mg by mouth daily      Budeson-Glycopyrrol-Formoterol (Breztri Aerosphere) 160-9-4.8 MCG/ACT AERO Take 2 puffs by mouth Every 12 hours      famotidine (PEPCID) 20 mg tablet Take 1 tablet (20 mg total) by mouth daily (Patient taking differently: Take 20 mg by mouth 2 (two) times a day)  0    glipiZIDE (GLUCOTROL XL) 2.5 mg 24 hr tablet Take 2.5 mg by mouth 2 (two) times a day      lisinopril (ZESTRIL) 20 mg tablet Take 20 mg by mouth 2 (two) times a day      LORazepam (ATIVAN) 0.5 mg tablet Take 0.5 mg by mouth daily at bedtime as needed      metFORMIN (GLUCOPHAGE) 500 mg tablet Take 500 mg by mouth 2 (two) times a day with meals      montelukast (SINGULAIR) 10 mg tablet Take 10 mg by mouth in the morning      predniSONE 5 mg tablet prednisone 5 mg tablet   Take 1 tablet every day by oral route as needed for 90 days.       No current facility-administered medications for this visit.     He is allergic to bactrim [sulfamethoxazole-trimethoprim]..    Review of Systems   Constitutional:  Negative for chills and fever.   HENT: Negative.     Respiratory: Negative.     Cardiovascular: Negative.    Gastrointestinal: Negative.    Genitourinary: Negative.    Musculoskeletal: Negative.    Skin:  Positive for rash.   Neurological: Negative.    Hematological: Negative.    Psychiatric/Behavioral: Negative.           Objective:      /74 (BP Location: Left arm, Patient Position: Sitting, Cuff Size: Standard)   Pulse 72   Temp  98.4 °F (36.9 °C) (Temporal)   Ht 6' (1.829 m)   Wt 77.8 kg (171 lb 9.6 oz)   SpO2 95%   BMI 23.27 kg/m²          Physical Exam  Vitals reviewed.   Constitutional:       General: He is not in acute distress.  HENT:      Head: Normocephalic.      Right Ear: External ear normal.      Left Ear: External ear normal.      Nose: Nose normal.      Mouth/Throat:      Mouth: Mucous membranes are moist.   Eyes:      Extraocular Movements: Extraocular movements intact.   Cardiovascular:      Rate and Rhythm: Normal rate.   Pulmonary:      Effort: Pulmonary effort is normal. No respiratory distress.   Abdominal:      General: There is no distension.      Palpations: Abdomen is soft.      Tenderness: There is no abdominal tenderness.      Hernia: A hernia is present.      Comments: Soft easily reducible mass in bilateral inguinal folds, right side is larger than left. Nontender, no overlying skin changes.   Musculoskeletal:         General: Normal range of motion.      Cervical back: Normal range of motion.   Skin:     General: Skin is warm.   Neurological:      General: No focal deficit present.      Mental Status: He is alert and oriented to person, place, and time.

## 2024-03-18 NOTE — ASSESSMENT & PLAN NOTE
- noticed for the past 1-2 months  - denies having any obstructive symptoms--denies nausea, vomiting, obstipation  - soft reducible masses palpated in bilateral inguinal canals, right side is larger than left  - asymptomatic--denies pain, pulling sensation, voiding appropriately  - no previous abdominal surgeries  - not a current smoker    Plan:  - discuss watchful waiting vs surgical repair. Discussed the risks of watchful waiting including but not limited to increase in hernia size, bowel incarceration/strangulation, obstruction. The patient would like to focus on the acute skin issues he is experiencing and revisit surgical repair of his hernia at a later time. The patient understands the return precautions that were discussed and will seek medical evaluation in the ED if they occur.

## 2024-03-25 ENCOUNTER — APPOINTMENT (OUTPATIENT)
Age: 79
End: 2024-03-25
Payer: COMMERCIAL

## 2024-03-25 DIAGNOSIS — L29.8 PRURITUS SENILIS: ICD-10-CM

## 2024-03-25 LAB
ALBUMIN SERPL BCP-MCNC: 4.1 G/DL (ref 3.5–5)
ALP SERPL-CCNC: 66 U/L (ref 34–104)
ALT SERPL W P-5'-P-CCNC: 15 U/L (ref 7–52)
ANION GAP SERPL CALCULATED.3IONS-SCNC: 10 MMOL/L (ref 4–13)
AST SERPL W P-5'-P-CCNC: 14 U/L (ref 13–39)
BASOPHILS # BLD AUTO: 0.05 THOUSANDS/ÂΜL (ref 0–0.1)
BASOPHILS NFR BLD AUTO: 1 % (ref 0–1)
BILIRUB DIRECT SERPL-MCNC: 0.07 MG/DL (ref 0–0.2)
BILIRUB SERPL-MCNC: 0.52 MG/DL (ref 0.2–1)
BUN SERPL-MCNC: 24 MG/DL (ref 5–25)
CALCIUM SERPL-MCNC: 9.2 MG/DL (ref 8.4–10.2)
CHLORIDE SERPL-SCNC: 98 MMOL/L (ref 96–108)
CO2 SERPL-SCNC: 25 MMOL/L (ref 21–32)
CREAT SERPL-MCNC: 1.14 MG/DL (ref 0.6–1.3)
EOSINOPHIL # BLD AUTO: 0.02 THOUSAND/ÂΜL (ref 0–0.61)
EOSINOPHIL NFR BLD AUTO: 0 % (ref 0–6)
ERYTHROCYTE [DISTWIDTH] IN BLOOD BY AUTOMATED COUNT: 15.3 % (ref 11.6–15.1)
GFR SERPL CREATININE-BSD FRML MDRD: 61 ML/MIN/1.73SQ M
GLUCOSE SERPL-MCNC: 353 MG/DL (ref 65–140)
HCT VFR BLD AUTO: 40.1 % (ref 36.5–49.3)
HGB BLD-MCNC: 13.4 G/DL (ref 12–17)
IMM GRANULOCYTES # BLD AUTO: 0.07 THOUSAND/UL (ref 0–0.2)
IMM GRANULOCYTES NFR BLD AUTO: 1 % (ref 0–2)
LYMPHOCYTES # BLD AUTO: 1.09 THOUSANDS/ÂΜL (ref 0.6–4.47)
LYMPHOCYTES NFR BLD AUTO: 12 % (ref 14–44)
MCH RBC QN AUTO: 30.4 PG (ref 26.8–34.3)
MCHC RBC AUTO-ENTMCNC: 33.4 G/DL (ref 31.4–37.4)
MCV RBC AUTO: 91 FL (ref 82–98)
MONOCYTES # BLD AUTO: 0.26 THOUSAND/ÂΜL (ref 0.17–1.22)
MONOCYTES NFR BLD AUTO: 3 % (ref 4–12)
NEUTROPHILS # BLD AUTO: 7.95 THOUSANDS/ÂΜL (ref 1.85–7.62)
NEUTS SEG NFR BLD AUTO: 83 % (ref 43–75)
NRBC BLD AUTO-RTO: 0 /100 WBCS
PLATELET # BLD AUTO: 284 THOUSANDS/UL (ref 149–390)
PMV BLD AUTO: 9.8 FL (ref 8.9–12.7)
POTASSIUM SERPL-SCNC: 4.6 MMOL/L (ref 3.5–5.3)
PROT SERPL-MCNC: 6.7 G/DL (ref 6.4–8.4)
RBC # BLD AUTO: 4.41 MILLION/UL (ref 3.88–5.62)
SODIUM SERPL-SCNC: 133 MMOL/L (ref 135–147)
TSH SERPL DL<=0.05 MIU/L-ACNC: 1.75 UIU/ML (ref 0.45–4.5)
WBC # BLD AUTO: 9.44 THOUSAND/UL (ref 4.31–10.16)

## 2024-03-25 PROCEDURE — 80076 HEPATIC FUNCTION PANEL: CPT

## 2024-03-25 PROCEDURE — 85025 COMPLETE CBC W/AUTO DIFF WBC: CPT

## 2024-03-25 PROCEDURE — 84443 ASSAY THYROID STIM HORMONE: CPT

## 2024-03-25 PROCEDURE — 36415 COLL VENOUS BLD VENIPUNCTURE: CPT

## 2024-03-25 PROCEDURE — 86618 LYME DISEASE ANTIBODY: CPT

## 2024-03-25 PROCEDURE — 80048 BASIC METABOLIC PNL TOTAL CA: CPT

## 2024-03-26 LAB — B BURGDOR IGG+IGM SER QL IA: NEGATIVE

## 2024-04-28 ENCOUNTER — HOSPITAL ENCOUNTER (INPATIENT)
Facility: HOSPITAL | Age: 79
LOS: 2 days | Discharge: HOME | DRG: 199 | End: 2024-04-30
Attending: FAMILY MEDICINE | Admitting: INTERNAL MEDICINE
Payer: COMMERCIAL

## 2024-04-28 ENCOUNTER — APPOINTMENT (EMERGENCY)
Dept: RADIOLOGY | Facility: HOSPITAL | Age: 79
DRG: 199 | End: 2024-04-28
Payer: COMMERCIAL

## 2024-04-28 ENCOUNTER — APPOINTMENT (EMERGENCY)
Dept: CT IMAGING | Facility: HOSPITAL | Age: 79
DRG: 199 | End: 2024-04-28
Payer: COMMERCIAL

## 2024-04-28 ENCOUNTER — APPOINTMENT (INPATIENT)
Dept: RADIOLOGY | Facility: HOSPITAL | Age: 79
DRG: 199 | End: 2024-04-28
Payer: COMMERCIAL

## 2024-04-28 DIAGNOSIS — J40 BRONCHITIS: ICD-10-CM

## 2024-04-28 DIAGNOSIS — J93.12 SECONDARY SPONTANEOUS PNEUMOTHORAX: ICD-10-CM

## 2024-04-28 DIAGNOSIS — J93.9 PNEUMOTHORAX: Primary | ICD-10-CM

## 2024-04-28 PROBLEM — J96.01 ACUTE RESPIRATORY FAILURE WITH HYPOXIA (HCC): Status: ACTIVE | Noted: 2024-04-28

## 2024-04-28 LAB
ANION GAP SERPL CALCULATED.3IONS-SCNC: 10 MMOL/L (ref 4–13)
ATRIAL RATE: 121 BPM
BASOPHILS # BLD AUTO: 0.06 THOUSANDS/ÂΜL (ref 0–0.1)
BASOPHILS NFR BLD AUTO: 1 % (ref 0–1)
BUN SERPL-MCNC: 18 MG/DL (ref 5–25)
CALCIUM SERPL-MCNC: 9.6 MG/DL (ref 8.4–10.2)
CARDIAC TROPONIN I PNL SERPL HS: 7 NG/L
CHLORIDE SERPL-SCNC: 98 MMOL/L (ref 96–108)
CO2 SERPL-SCNC: 26 MMOL/L (ref 21–32)
CREAT SERPL-MCNC: 1.11 MG/DL (ref 0.6–1.3)
EOSINOPHIL # BLD AUTO: 0.16 THOUSAND/ÂΜL (ref 0–0.61)
EOSINOPHIL NFR BLD AUTO: 1 % (ref 0–6)
ERYTHROCYTE [DISTWIDTH] IN BLOOD BY AUTOMATED COUNT: 14.6 % (ref 11.6–15.1)
GFR SERPL CREATININE-BSD FRML MDRD: 63 ML/MIN/1.73SQ M
GLUCOSE SERPL-MCNC: 180 MG/DL (ref 65–140)
GLUCOSE SERPL-MCNC: 249 MG/DL (ref 65–140)
GLUCOSE SERPL-MCNC: 274 MG/DL (ref 65–140)
HCT VFR BLD AUTO: 42.1 % (ref 36.5–49.3)
HGB BLD-MCNC: 14 G/DL (ref 12–17)
IMM GRANULOCYTES # BLD AUTO: 0.09 THOUSAND/UL (ref 0–0.2)
IMM GRANULOCYTES NFR BLD AUTO: 1 % (ref 0–2)
LYMPHOCYTES # BLD AUTO: 2.15 THOUSANDS/ÂΜL (ref 0.6–4.47)
LYMPHOCYTES NFR BLD AUTO: 18 % (ref 14–44)
MCH RBC QN AUTO: 30.6 PG (ref 26.8–34.3)
MCHC RBC AUTO-ENTMCNC: 33.3 G/DL (ref 31.4–37.4)
MCV RBC AUTO: 92 FL (ref 82–98)
MONOCYTES # BLD AUTO: 1.04 THOUSAND/ÂΜL (ref 0.17–1.22)
MONOCYTES NFR BLD AUTO: 9 % (ref 4–12)
NEUTROPHILS # BLD AUTO: 8.67 THOUSANDS/ÂΜL (ref 1.85–7.62)
NEUTS SEG NFR BLD AUTO: 70 % (ref 43–75)
NRBC BLD AUTO-RTO: 0 /100 WBCS
P AXIS: 85 DEGREES
PLATELET # BLD AUTO: 245 THOUSANDS/UL (ref 149–390)
PMV BLD AUTO: 9 FL (ref 8.9–12.7)
POTASSIUM SERPL-SCNC: 3.8 MMOL/L (ref 3.5–5.3)
PR INTERVAL: 142 MS
QRS AXIS: 86 DEGREES
QRSD INTERVAL: 90 MS
QT INTERVAL: 318 MS
QTC INTERVAL: 451 MS
RBC # BLD AUTO: 4.58 MILLION/UL (ref 3.88–5.62)
SODIUM SERPL-SCNC: 134 MMOL/L (ref 135–147)
T WAVE AXIS: 70 DEGREES
VENTRICULAR RATE: 121 BPM
WBC # BLD AUTO: 12.17 THOUSAND/UL (ref 4.31–10.16)

## 2024-04-28 PROCEDURE — 99223 1ST HOSP IP/OBS HIGH 75: CPT | Performed by: INTERNAL MEDICINE

## 2024-04-28 PROCEDURE — 32556 INSERT CATH PLEURA W/O IMAGE: CPT | Performed by: FAMILY MEDICINE

## 2024-04-28 PROCEDURE — 93005 ELECTROCARDIOGRAM TRACING: CPT

## 2024-04-28 PROCEDURE — 71045 X-RAY EXAM CHEST 1 VIEW: CPT

## 2024-04-28 PROCEDURE — 1124F ACP DISCUSS-NO DSCNMKR DOCD: CPT | Performed by: INTERNAL MEDICINE

## 2024-04-28 PROCEDURE — 71260 CT THORAX DX C+: CPT

## 2024-04-28 PROCEDURE — 93010 ELECTROCARDIOGRAM REPORT: CPT | Performed by: INTERNAL MEDICINE

## 2024-04-28 PROCEDURE — 99285 EMERGENCY DEPT VISIT HI MDM: CPT

## 2024-04-28 PROCEDURE — 99285 EMERGENCY DEPT VISIT HI MDM: CPT | Performed by: FAMILY MEDICINE

## 2024-04-28 PROCEDURE — 36415 COLL VENOUS BLD VENIPUNCTURE: CPT | Performed by: PHYSICIAN ASSISTANT

## 2024-04-28 PROCEDURE — 71046 X-RAY EXAM CHEST 2 VIEWS: CPT

## 2024-04-28 PROCEDURE — 80048 BASIC METABOLIC PNL TOTAL CA: CPT | Performed by: PHYSICIAN ASSISTANT

## 2024-04-28 PROCEDURE — 0W9930Z DRAINAGE OF RIGHT PLEURAL CAVITY WITH DRAINAGE DEVICE, PERCUTANEOUS APPROACH: ICD-10-PCS | Performed by: PHYSICIAN ASSISTANT

## 2024-04-28 PROCEDURE — 84484 ASSAY OF TROPONIN QUANT: CPT | Performed by: PHYSICIAN ASSISTANT

## 2024-04-28 PROCEDURE — 96376 TX/PRO/DX INJ SAME DRUG ADON: CPT

## 2024-04-28 PROCEDURE — 96374 THER/PROPH/DIAG INJ IV PUSH: CPT

## 2024-04-28 PROCEDURE — 82948 REAGENT STRIP/BLOOD GLUCOSE: CPT

## 2024-04-28 PROCEDURE — 96361 HYDRATE IV INFUSION ADD-ON: CPT

## 2024-04-28 PROCEDURE — 85025 COMPLETE CBC W/AUTO DIFF WBC: CPT | Performed by: PHYSICIAN ASSISTANT

## 2024-04-28 RX ORDER — ATENOLOL 25 MG/1
25 TABLET ORAL DAILY
Status: DISCONTINUED | OUTPATIENT
Start: 2024-04-28 | End: 2024-04-30 | Stop reason: HOSPADM

## 2024-04-28 RX ORDER — AMLODIPINE BESYLATE 5 MG/1
5 TABLET ORAL 2 TIMES DAILY
Status: DISCONTINUED | OUTPATIENT
Start: 2024-04-28 | End: 2024-04-30 | Stop reason: HOSPADM

## 2024-04-28 RX ORDER — HYDROCODONE BITARTRATE AND ACETAMINOPHEN 5; 325 MG/1; MG/1
1 TABLET ORAL EVERY 6 HOURS PRN
Status: DISCONTINUED | OUTPATIENT
Start: 2024-04-28 | End: 2024-04-30 | Stop reason: HOSPADM

## 2024-04-28 RX ORDER — FAMOTIDINE 20 MG/1
20 TABLET, FILM COATED ORAL DAILY
Status: DISCONTINUED | OUTPATIENT
Start: 2024-04-28 | End: 2024-04-30 | Stop reason: HOSPADM

## 2024-04-28 RX ORDER — ONDANSETRON 2 MG/ML
4 INJECTION INTRAMUSCULAR; INTRAVENOUS EVERY 6 HOURS PRN
Status: DISCONTINUED | OUTPATIENT
Start: 2024-04-28 | End: 2024-04-30 | Stop reason: HOSPADM

## 2024-04-28 RX ORDER — MONTELUKAST SODIUM 10 MG/1
10 TABLET ORAL DAILY
Status: DISCONTINUED | OUTPATIENT
Start: 2024-04-28 | End: 2024-04-30 | Stop reason: HOSPADM

## 2024-04-28 RX ORDER — INSULIN LISPRO 100 [IU]/ML
1-6 INJECTION, SOLUTION INTRAVENOUS; SUBCUTANEOUS
Status: DISCONTINUED | OUTPATIENT
Start: 2024-04-28 | End: 2024-04-29

## 2024-04-28 RX ORDER — HYDROMORPHONE HCL/PF 1 MG/ML
0.5 SYRINGE (ML) INJECTION ONCE
Status: COMPLETED | OUTPATIENT
Start: 2024-04-28 | End: 2024-04-28

## 2024-04-28 RX ORDER — SODIUM CHLORIDE 9 MG/ML
3 INJECTION INTRAVENOUS
Status: DISCONTINUED | OUTPATIENT
Start: 2024-04-28 | End: 2024-04-30 | Stop reason: HOSPADM

## 2024-04-28 RX ORDER — LORAZEPAM 0.5 MG/1
0.5 TABLET ORAL
Status: DISCONTINUED | OUTPATIENT
Start: 2024-04-28 | End: 2024-04-30 | Stop reason: HOSPADM

## 2024-04-28 RX ORDER — LIDOCAINE HYDROCHLORIDE AND EPINEPHRINE 10; 10 MG/ML; UG/ML
10 INJECTION, SOLUTION INFILTRATION; PERINEURAL ONCE
Status: COMPLETED | OUTPATIENT
Start: 2024-04-28 | End: 2024-04-28

## 2024-04-28 RX ORDER — LISINOPRIL 20 MG/1
20 TABLET ORAL 2 TIMES DAILY
Status: DISCONTINUED | OUTPATIENT
Start: 2024-04-28 | End: 2024-04-30 | Stop reason: HOSPADM

## 2024-04-28 RX ORDER — ALBUTEROL SULFATE 90 UG/1
1 AEROSOL, METERED RESPIRATORY (INHALATION) EVERY 6 HOURS PRN
Status: DISCONTINUED | OUTPATIENT
Start: 2024-04-28 | End: 2024-04-30 | Stop reason: HOSPADM

## 2024-04-28 RX ORDER — BUDESONIDE AND FORMOTEROL FUMARATE DIHYDRATE 160; 4.5 UG/1; UG/1
2 AEROSOL RESPIRATORY (INHALATION) 2 TIMES DAILY
Status: DISCONTINUED | OUTPATIENT
Start: 2024-04-28 | End: 2024-04-30 | Stop reason: HOSPADM

## 2024-04-28 RX ORDER — HYDROMORPHONE HCL/PF 1 MG/ML
1 SYRINGE (ML) INJECTION ONCE
Status: COMPLETED | OUTPATIENT
Start: 2024-04-28 | End: 2024-04-28

## 2024-04-28 RX ORDER — ACETAMINOPHEN 325 MG/1
650 TABLET ORAL EVERY 6 HOURS PRN
Status: DISCONTINUED | OUTPATIENT
Start: 2024-04-28 | End: 2024-04-30 | Stop reason: HOSPADM

## 2024-04-28 RX ORDER — PREDNISONE 10 MG/1
10 TABLET ORAL DAILY
Status: DISCONTINUED | OUTPATIENT
Start: 2024-04-29 | End: 2024-04-30 | Stop reason: HOSPADM

## 2024-04-28 RX ORDER — ENOXAPARIN SODIUM 100 MG/ML
40 INJECTION SUBCUTANEOUS DAILY
Status: DISCONTINUED | OUTPATIENT
Start: 2024-04-28 | End: 2024-04-30 | Stop reason: HOSPADM

## 2024-04-28 RX ADMIN — HYDROCODONE BITARTRATE AND ACETAMINOPHEN 1 TABLET: 5; 325 TABLET ORAL at 20:57

## 2024-04-28 RX ADMIN — SODIUM CHLORIDE 500 ML: 0.9 INJECTION, SOLUTION INTRAVENOUS at 11:23

## 2024-04-28 RX ADMIN — ENOXAPARIN SODIUM 40 MG: 40 INJECTION SUBCUTANEOUS at 18:24

## 2024-04-28 RX ADMIN — LIDOCAINE HYDROCHLORIDE,EPINEPHRINE BITARTRATE 10 ML: 10; .01 INJECTION, SOLUTION INFILTRATION; PERINEURAL at 14:34

## 2024-04-28 RX ADMIN — IOHEXOL 85 ML: 350 INJECTION, SOLUTION INTRAVENOUS at 13:06

## 2024-04-28 RX ADMIN — AMLODIPINE BESYLATE 5 MG: 5 TABLET ORAL at 20:57

## 2024-04-28 RX ADMIN — MONTELUKAST 10 MG: 10 TABLET, FILM COATED ORAL at 18:24

## 2024-04-28 RX ADMIN — BUDESONIDE AND FORMOTEROL FUMARATE DIHYDRATE 2 PUFF: 160; 4.5 AEROSOL RESPIRATORY (INHALATION) at 18:24

## 2024-04-28 RX ADMIN — HYDROMORPHONE HYDROCHLORIDE 1 MG: 1 INJECTION, SOLUTION INTRAMUSCULAR; INTRAVENOUS; SUBCUTANEOUS at 15:14

## 2024-04-28 RX ADMIN — INSULIN LISPRO 3 UNITS: 100 INJECTION, SOLUTION INTRAVENOUS; SUBCUTANEOUS at 18:24

## 2024-04-28 RX ADMIN — LISINOPRIL 20 MG: 20 TABLET ORAL at 20:57

## 2024-04-28 RX ADMIN — HYDROMORPHONE HYDROCHLORIDE 0.5 MG: 1 INJECTION, SOLUTION INTRAMUSCULAR; INTRAVENOUS; SUBCUTANEOUS at 14:34

## 2024-04-28 NOTE — ASSESSMENT & PLAN NOTE
Not in exacerbation.  Continue formulary Symbicort while inpatient can continue benztri on discharge.

## 2024-04-28 NOTE — ED NOTES
Right chest tube placed by BONILLA Moreau. Chest tube in place and dressing C/D/I. Chest tube to wall suction. Patient tolerated procedure. Bed in low position, call bell within reach. Wife at bedside.      Karina Garcia RN  04/28/24 [id number]

## 2024-04-28 NOTE — ASSESSMENT & PLAN NOTE
Has improved -secondary to the pneumothorax as outlined above  Highest oxygen requirement on this admission was 5 L of supplemental oxygen via nasal cannula  Oxygen requirement is now down to 2 L of supplemental oxygen  Continue to wean as able

## 2024-04-28 NOTE — PLAN OF CARE
Problem: SKIN/TISSUE INTEGRITY - ADULT  Goal: Incision(s), wounds(s) or drain site(s) healing without S/S of infection  Description: INTERVENTIONS  - Assess and document dressing, incision, wound bed, drain sites and surrounding tissue  - Provide patient and family education  Outcome: Progressing     Problem: RESPIRATORY - ADULT  Goal: Achieves optimal ventilation and oxygenation  Description: INTERVENTIONS:  - Assess for changes in respiratory status  - Assess for changes in mentation and behavior  - Position to facilitate oxygenation and minimize respiratory effort  - Oxygen administered by appropriate delivery if ordered  - Initiate smoking cessation education as indicated  - Encourage broncho-pulmonary hygiene including cough, deep breathe, Incentive Spirometry  - Assess the need for suctioning and aspirate as needed  - Assess and instruct to report SOB or any respiratory difficulty  - Respiratory Therapy support as indicated  Outcome: Progressing     Problem: Knowledge Deficit  Goal: Patient/family/caregiver demonstrates understanding of disease process, treatment plan, medications, and discharge instructions  Description: Complete learning assessment and assess knowledge base.  Interventions:  - Provide teaching at level of understanding  - Provide teaching via preferred learning methods  Outcome: Progressing

## 2024-04-28 NOTE — ED PROVIDER NOTES
History  Chief Complaint   Patient presents with    Chest Pain     Pt has COPD and chest pain when breathing. SOB      78-year-old male presents to the emergency department seen evaluation for chest discomfort and cough and shortness of breath.  He reports having history of COPD for which she takes inhalers as well as asthma.  He states over the last week or so he has had more frequent coughing fits with increased severity.  He has tried different medications with minimal relief.  He did take some nebulizers before coming to the ER likely explaining his mild tachycardia on arrival.  Overall he appears generally well with some mild discomfort.    Allergies reviewed        Prior to Admission Medications   Prescriptions Last Dose Informant Patient Reported? Taking?   Budeson-Glycopyrrol-Formoterol (Breztri Aerosphere) 160-9-4.8 MCG/ACT AERO   Yes No   Sig: Take 2 puffs by mouth Every 12 hours   LORazepam (ATIVAN) 0.5 mg tablet  Self Yes No   Sig: Take 0.5 mg by mouth daily at bedtime as needed   albuterol (PROVENTIL HFA,VENTOLIN HFA) 90 mcg/act inhaler  Self Yes No   Sig: Ventolin HFA 90 mcg/actuation aerosol inhaler   amLODIPine (NORVASC) 5 mg tablet  Self Yes No   Sig: Take 5 mg by mouth 2 (two) times a day   atenolol (TENORMIN) 25 mg tablet  Self Yes No   Sig: Take 25 mg by mouth daily   famotidine (PEPCID) 20 mg tablet  Self No No   Sig: Take 1 tablet (20 mg total) by mouth daily   Patient taking differently: Take 20 mg by mouth 2 (two) times a day   glipiZIDE (GLUCOTROL XL) 2.5 mg 24 hr tablet  Self Yes No   Sig: Take 2.5 mg by mouth 2 (two) times a day   lisinopril (ZESTRIL) 20 mg tablet  Self Yes No   Sig: Take 20 mg by mouth 2 (two) times a day   metFORMIN (GLUCOPHAGE) 500 mg tablet  Self Yes No   Sig: Take 500 mg by mouth 2 (two) times a day with meals   montelukast (SINGULAIR) 10 mg tablet  Self Yes No   Sig: Take 10 mg by mouth in the morning   predniSONE 5 mg tablet  Self Yes No   Sig: prednisone 5 mg  tablet   Take 1 tablet every day by oral route as needed for 90 days.      Facility-Administered Medications: None       Past Medical History:   Diagnosis Date    Asthma     Diabetes mellitus (HCC)     Environmental allergies     Hyperlipidemia     Hypertension     Macular degeneration 07/13/2020    right eye    Orthostatic hypotension     Osteopenia     Sinusitis        Past Surgical History:   Procedure Laterality Date    COLONOSCOPY      KNEE CARTILAGE SURGERY Right 1964    VASECTOMY      WISDOM TOOTH EXTRACTION      x4       Family History   Problem Relation Age of Onset    Asthma Mother     Emphysema Mother     Cancer Father     Asthma Brother     Cancer Brother      I have reviewed and agree with the history as documented.    E-Cigarette/Vaping    E-Cigarette Use Never User      E-Cigarette/Vaping Substances    Nicotine No     THC No     CBD No     Flavoring No     Other No     Unknown No      Social History     Tobacco Use    Smoking status: Former    Smokeless tobacco: Never    Tobacco comments:     quit about 25 years ago   Vaping Use    Vaping status: Never Used   Substance Use Topics    Alcohol use: Never    Drug use: Never       Review of Systems   Constitutional:  Negative for chills and fever.   HENT:  Negative for ear pain and sore throat.    Eyes:  Negative for pain and visual disturbance.   Respiratory:  Positive for cough and shortness of breath.    Cardiovascular:  Negative for chest pain and palpitations.   Gastrointestinal:  Negative for abdominal pain and vomiting.   Genitourinary:  Negative for dysuria and hematuria.   Musculoskeletal:  Negative for arthralgias and back pain.   Skin:  Negative for color change and rash.   Neurological:  Negative for seizures and syncope.   All other systems reviewed and are negative.      Physical Exam  Physical Exam  Vitals and nursing note reviewed.   Constitutional:       General: He is not in acute distress.     Appearance: He is well-developed.   HENT:       Head: Normocephalic and atraumatic.   Eyes:      Conjunctiva/sclera: Conjunctivae normal.   Cardiovascular:      Rate and Rhythm: Normal rate and regular rhythm.      Heart sounds: No murmur heard.  Pulmonary:      Effort: Pulmonary effort is normal. No respiratory distress.      Breath sounds: Decreased breath sounds present.   Abdominal:      Palpations: Abdomen is soft.      Tenderness: There is no abdominal tenderness.   Musculoskeletal:         General: No swelling.      Cervical back: Neck supple.   Skin:     General: Skin is warm and dry.      Capillary Refill: Capillary refill takes less than 2 seconds.   Neurological:      Mental Status: He is alert.   Psychiatric:         Mood and Affect: Mood normal.         Vital Signs  ED Triage Vitals [04/28/24 1013]   Temperature Pulse Respirations Blood Pressure SpO2   (!) 96.7 °F (35.9 °C) (!) 113 20 (!) 163/104 92 %      Temp src Heart Rate Source Patient Position - Orthostatic VS BP Location FiO2 (%)   -- Monitor Sitting Left arm --      Pain Score       4           Vitals:    04/28/24 1030 04/28/24 1045 04/28/24 1118 04/28/24 1438   BP:   (!) 136/103 125/80   Pulse: (!) 109 101 (!) 106 94   Patient Position - Orthostatic VS:             Visual Acuity      ED Medications  Medications   sodium chloride (PF) 0.9 % injection 3 mL (has no administration in time range)   amLODIPine (NORVASC) tablet 5 mg (has no administration in time range)   atenolol (TENORMIN) tablet 25 mg (has no administration in time range)   albuterol (PROVENTIL HFA,VENTOLIN HFA) inhaler 1 puff (has no administration in time range)   budesonide-formoterol (SYMBICORT) 160-4.5 mcg/act inhaler 2 puff (has no administration in time range)   famotidine (PEPCID) tablet 20 mg (has no administration in time range)   lisinopril (ZESTRIL) tablet 20 mg (has no administration in time range)   LORazepam (ATIVAN) tablet 0.5 mg (has no administration in time range)   montelukast (SINGULAIR) tablet 10 mg (has  no administration in time range)   insulin lispro (HumALOG/ADMELOG) 100 units/mL subcutaneous injection 1-6 Units (has no administration in time range)   acetaminophen (TYLENOL) tablet 650 mg (has no administration in time range)   ondansetron (ZOFRAN) injection 4 mg (has no administration in time range)   enoxaparin (LOVENOX) subcutaneous injection 40 mg (has no administration in time range)   sodium chloride 0.9 % bolus 500 mL (500 mL Intravenous New Bag 4/28/24 1123)   iohexol (OMNIPAQUE) 350 MG/ML injection (MULTI-DOSE) 85 mL (85 mL Intravenous Given 4/28/24 1306)   lidocaine-epinephrine (XYLOCAINE/EPINEPHRINE) 1 %-1:100,000 injection 10 mL (10 mL Infiltration Given by Other 4/28/24 1434)   HYDROmorphone (DILAUDID) injection 0.5 mg (0.5 mg Intravenous Given 4/28/24 1434)   HYDROmorphone (DILAUDID) injection 1 mg (1 mg Intravenous Given 4/28/24 1514)       Diagnostic Studies  Results Reviewed       Procedure Component Value Units Date/Time    HS Troponin 0hr (reflex protocol) [[id number]]  (Normal) Collected: 04/28/24 1018    Lab Status: Final result Specimen: Blood from Arm, Right Updated: 04/28/24 1049     hs TnI 0hr 7 ng/L     Basic metabolic panel [[id number]]  (Abnormal) Collected: 04/28/24 1018    Lab Status: Final result Specimen: Blood from Arm, Right Updated: 04/28/24 1042     Sodium 134 mmol/L      Potassium 3.8 mmol/L      Chloride 98 mmol/L      CO2 26 mmol/L      ANION GAP 10 mmol/L      BUN 18 mg/dL      Creatinine 1.11 mg/dL      Glucose 274 mg/dL      Calcium 9.6 mg/dL      eGFR 63 ml/min/1.73sq m     Narrative:      [employer] guidelines for Chronic Kidney Disease (CKD):     Stage 1 with normal or high GFR (GFR > 90 mL/min/1.73 square meters)    Stage 2 Mild CKD (GFR = 60-89 mL/min/1.73 square meters)    Stage 3A Moderate CKD (GFR = 45-59 mL/min/1.73 square meters)    Stage 3B Moderate CKD (GFR = 30-44 mL/min/1.73 square meters)    Stage 4 Severe CKD (GFR = 15-29 mL/min/1.73  square meters)    Stage 5 End Stage CKD (GFR <15 mL/min/1.73 square meters)  Note: GFR calculation is accurate only with a steady state creatinine    CBC and differential [[id number]]  (Abnormal) Collected: 04/28/24 1018    Lab Status: Final result Specimen: Blood from Arm, Right Updated: 04/28/24 1028     WBC 12.17 Thousand/uL      RBC 4.58 Million/uL      Hemoglobin 14.0 g/dL      Hematocrit 42.1 %      MCV 92 fL      MCH 30.6 pg      MCHC 33.3 g/dL      RDW 14.6 %      MPV 9.0 fL      Platelets 245 Thousands/uL      nRBC 0 /100 WBCs      Segmented % 70 %      Immature Grans % 1 %      Lymphocytes % 18 %      Monocytes % 9 %      Eosinophils Relative 1 %      Basophils Relative 1 %      Absolute Neutrophils 8.67 Thousands/µL      Absolute Immature Grans 0.09 Thousand/uL      Absolute Lymphocytes 2.15 Thousands/µL      Absolute Monocytes 1.04 Thousand/µL      Eosinophils Absolute 0.16 Thousand/µL      Basophils Absolute 0.06 Thousands/µL                    CT chest with contrast   Final Result by Staci Shah MD (04/28 1349)      Large right pneumothorax.      Moderate emphysema.         I personally discussed this study with BRYAN VILLAFANA on 4/28/2024 1:49 PM.               Workstation performed: [id number]         XR chest 2 views    (Results Pending)   XR chest 1 view portable    (Results Pending)              Procedures  Chest Tube    Date/Time: 4/28/2024 3:42 PM    Performed by: Bryan Villafana PA-C  Authorized by: Bryan Villafana PA-C    Patient location:  ED  Consent:     Consent obtained:  Verbal and written    Consent given by:  Patient and spouse    Risks discussed:  Bleeding, incomplete drainage, damage to surrounding structures, infection, pain and nerve damage  Universal protocol:     Procedure explained and questions answered to patient or proxy's satisfaction: yes      Relevant documents present and verified: yes      Test results available and properly labeled: yes       Radiology Images displayed and confirmed.  If images not available, report reviewed: yes      Required blood products, implants, devices, and special equipment available: yes      Site/side marked: yes      Patient identity confirmed:  Verbally with patient  Pre-procedure details:     Skin preparation:  ChloraPrep  Indications:     Indications: pneumothroax    Anesthesia (see MAR for exact dosages):     Anesthesia method:  Local infiltration    Local anesthetic:  Lidocaine 1% WITH epi  Procedure details:     Placement location:  Lateral    Laterality:  Right    Approach:  Percutaneous    Scalpel size:  11    Thal-Quick Chest Tube Kit:  16 Fr    Ultrasound guidance: no      Tension pneumothorax: no      Needle Decompression: no      Tube connected to:  Suction and water seal    Drainage characteristics:  Air only    Suture material:  0 silk    Dressing:  4x4 sterile gauze  Post-procedure details:     Post-insertion x-ray findings: tube in good position      Patient tolerance of procedure:  Tolerated well, no immediate complications           ED Course  ED Course as of 04/28/24 1610   Sun Apr 28, 2024   1048 Patient has elevated sepsis score.  I do not believe him to be septic at this moment.  He endorses taking and a nebulizer and inhaler prior to coming which is likely contributing to his tachycardia.  Patient does have a history of COPD.  May possibly have bronchitis.   1348 Pneumothorax identified.  Patient will require chest tube.                               SBIRT 22yo+      Flowsheet Row Most Recent Value   Initial Alcohol Screen: US AUDIT-C     1. How often do you have a drink containing alcohol? 0 Filed at: 04/28/2024 1013   2. How many drinks containing alcohol do you have on a typical day you are drinking?  0 Filed at: 04/28/2024 1013   3a. Male UNDER 65: How often do you have five or more drinks on one occasion? 0 Filed at: 04/28/2024 1013   3b. FEMALE Any Age, or MALE 65+: How often do you have 4 or more  drinks on one occassion? 0 Filed at: 04/28/2024 1013   Audit-C Score 0 Filed at: 04/28/2024 1013   IMTIAZ: How many times in the past year have you...    Used an illegal drug or used a prescription medication for non-medical reasons? Never Filed at: 04/28/2024 1013                      Medical Decision Making  Chest tube placed in the ER for identified pneumothorax.  Patient appears to been describing bronchitis symptoms.  Thorax is atraumatic.  Shortness of breath improved after chest tube placement.  Patient mated for further monitoring and evaluation.  Probably would benefit from pulmonology evaluation.    Amount and/or Complexity of Data Reviewed  Labs: ordered.  Radiology: ordered.    Risk  Prescription drug management.  Decision regarding hospitalization.             Disposition  Final diagnoses:   Pneumothorax   Bronchitis     Time reflects when diagnosis was documented in both MDM as applicable and the Disposition within this note       Time User Action Codes Description Comment    4/28/2024  2:00 PM Bryan Diehl Add [J93.9] Pneumothorax     4/28/2024  3:41 PM Bryan Diehl Add [J40] Bronchitis     4/28/2024  3:46 PM Justine Daniel Add [J93.12] Secondary spontaneous pneumothorax           ED Disposition       ED Disposition   Admit    Condition   Stable    Date/Time   Sun Apr 28, 2024 5220    Comment   Case was discussed with Dr. Daniel and the patient's admission status was agreed to be Admission Status: inpatient status to the service of Dr. Daniel .               Follow-up Information    None         Current Discharge Medication List        CONTINUE these medications which have NOT CHANGED    Details   albuterol (PROVENTIL HFA,VENTOLIN HFA) 90 mcg/act inhaler Ventolin HFA 90 mcg/actuation aerosol inhaler      amLODIPine (NORVASC) 5 mg tablet Take 5 mg by mouth 2 (two) times a day      atenolol (TENORMIN) 25 mg tablet Take 25 mg by mouth daily      Budeson-Glycopyrrol-Formoterol (Breztri Aerosphere)  160-9-4.8 MCG/ACT AERO Take 2 puffs by mouth Every 12 hours      famotidine (PEPCID) 20 mg tablet Take 1 tablet (20 mg total) by mouth daily  Refills: 0    Associated Diagnoses: GERD (gastroesophageal reflux disease)      glipiZIDE (GLUCOTROL XL) 2.5 mg 24 hr tablet Take 2.5 mg by mouth 2 (two) times a day      lisinopril (ZESTRIL) 20 mg tablet Take 20 mg by mouth 2 (two) times a day      LORazepam (ATIVAN) 0.5 mg tablet Take 0.5 mg by mouth daily at bedtime as needed      metFORMIN (GLUCOPHAGE) 500 mg tablet Take 500 mg by mouth 2 (two) times a day with meals      montelukast (SINGULAIR) 10 mg tablet Take 10 mg by mouth in the morning      predniSONE 5 mg tablet prednisone 5 mg tablet   Take 1 tablet every day by oral route as needed for 90 days.             No discharge procedures on file.    PDMP Review       None            ED Provider  Electronically Signed by             Bryan Diehl PA-C  04/28/24 2220

## 2024-04-28 NOTE — ASSESSMENT & PLAN NOTE
Most recent chest x-ray from earlier today reveals pneumothorax resolution  Chest tube remains in place  Formal pulmonary consultation is pending  Defer additional management to pulmonary

## 2024-04-28 NOTE — ASSESSMENT & PLAN NOTE
Lab Results   Component Value Date    HGBA1C 7.5 (H) 02/23/2024   Oral hypoglycemic medications remain on hold  Target blood sugar for the hospital is 140-180  Continue Accu-Cheks before meals and at bedtime with insulin coverage otherwise

## 2024-04-28 NOTE — H&P
ADMISSION NOTE   [facility]       Name: Gold Van   Age & Sex: 78 y.o. male   MRN: [id number]  Unit/Bed#: -01   Encounter: [id number]  Primary Care Provider: Shayne Diaz MD      * Secondary spontaneous pneumothorax  Assessment & Plan  Patient with prior history of COPD and panlobular emphysema who presented with sudden onset right-sided chest tightness started on day of presentation.  Describes having a lot of coughing spells recently which might be the etiology for pneumothorax.  Imaging on presentation showing right sided large pneumothorax s/p chest tube placement.  Also noted hypoxia requiring up to 4 to 5 L oxygen currently saturating well on room air.  Does not seem like an acute exacerbation.    Pulmonary consulted  Chest tube to suction  Repeat chest x-ray in a.m. to monitor resolution      Acute respiratory failure with hypoxia (HCC)  Assessment & Plan  No baseline oxygen requirement.  Required up to 4 to 5 L in ED likely in setting of pneumothorax with resolution currently after chest tube placement.  Shortness of breath is improved significantly but still not back to baseline.  Does not seem like an acute exacerbation.    See plan as above    Pruritus  Assessment & Plan  Currently on chronic prednisone therapy 10 mg alternating with 15 mg every day.     Asthma  Assessment & Plan  Not in exacerbation.  Continue formulary Symbicort while inpatient can continue benztri on discharge.    Essential hypertension  Assessment & Plan  Continue amlodipine, lisinopril, atenolol    Type 2 diabetes mellitus with complication, without long-term current use of insulin (HCC)  Assessment & Plan  Lab Results   Component Value Date    HGBA1C 7.5 (H) 02/23/2024   Hold metformin, glipizide.  Sliding scale coverage.  Hypoglycemic protocol.  Carbohydrate controlled diet.          VTE Prophylaxis: Enoxaparin (Lovenox)  / sequential compression device and foot pump applied   Code  Status: Level 1 full code  POLST: Unknown  Discussion with family: None    Anticipated Length of Stay:  Patient will be admitted on an Inpatient basis with an anticipated length of stay of  > 2 midnights.   Justification for Hospital Stay: Large right pneumothorax    Total Time for Visit, including Counseling / Coordination of Care: 45 minutes.  Greater than 50% of this total time spent on direct patient counseling and coordination of care.    Chief Complaint:   Multiple episodes of cough started to have right-sided chest pain started few hours prior to presentation    History of Present Illness:    Gold Van is a 78 y.o. male with past medical history of COPD emphysematous type, hypertension, hyperlipidemia, diabetes who presents with sudden onset chest tightness located throughout chest started on the morning of presentation.  Has some shortness of breath around 1-2 2 weeks prior to presentation.  Had a lot of coughing spells.  Denies any fever, chills.  No wheezing.  Has been using inhaler regularly.  No nausea vomiting, diarrhea, abdominal pain.    Review of Systems:    Review of Systems   Constitutional:  Positive for activity change. Negative for appetite change, chills, diaphoresis, fatigue, fever and unexpected weight change.   HENT:  Negative for rhinorrhea and sore throat.    Eyes:  Negative for visual disturbance.   Respiratory:  Positive for cough, chest tightness and shortness of breath.    Cardiovascular:  Positive for chest pain. Negative for palpitations and leg swelling.   Gastrointestinal:  Negative for abdominal distention, abdominal pain, blood in stool, constipation, diarrhea, nausea and vomiting.   Genitourinary:  Negative for difficulty urinating.   Musculoskeletal:  Negative for arthralgias.   Neurological:  Negative for headaches.   Psychiatric/Behavioral:  Negative for behavioral problems and sleep disturbance.        Past Medical and Surgical History:     Past Medical History:    Diagnosis Date    Asthma     Diabetes mellitus (HCC)     Environmental allergies     Hyperlipidemia     Hypertension     Macular degeneration 07/13/2020    right eye    Orthostatic hypotension     Osteopenia     Sinusitis        Past Surgical History:   Procedure Laterality Date    COLONOSCOPY      KNEE CARTILAGE SURGERY Right 1964    VASECTOMY      WISDOM TOOTH EXTRACTION      x4       Meds/Allergies:    Prior to Admission medications    Medication Sig Start Date End Date Taking? Authorizing Provider   albuterol (PROVENTIL HFA,VENTOLIN HFA) 90 mcg/act inhaler Ventolin HFA 90 mcg/actuation aerosol inhaler    Historical Provider, MD   amLODIPine (NORVASC) 5 mg tablet Take 5 mg by mouth 2 (two) times a day    Historical Provider, MD   atenolol (TENORMIN) 25 mg tablet Take 25 mg by mouth daily    Historical Provider, MD   Budeson-Glycopyrrol-Formoterol (Breztri Aerosphere) 160-9-4.8 MCG/ACT AERO Take 2 puffs by mouth Every 12 hours    Historical Provider, MD   famotidine (PEPCID) 20 mg tablet Take 1 tablet (20 mg total) by mouth daily  Patient taking differently: Take 20 mg by mouth 2 (two) times a day 3/26/21   Mendy Watson DO   glipiZIDE (GLUCOTROL XL) 2.5 mg 24 hr tablet Take 2.5 mg by mouth 2 (two) times a day    Historical Provider, MD   lisinopril (ZESTRIL) 20 mg tablet Take 20 mg by mouth 2 (two) times a day    Historical Provider, MD   LORazepam (ATIVAN) 0.5 mg tablet Take 0.5 mg by mouth daily at bedtime as needed    Historical Provider, MD   metFORMIN (GLUCOPHAGE) 500 mg tablet Take 500 mg by mouth 2 (two) times a day with meals    Historical Provider, MD   montelukast (SINGULAIR) 10 mg tablet Take 10 mg by mouth in the morning    Historical Provider, MD   predniSONE 5 mg tablet prednisone 5 mg tablet   Take 1 tablet every day by oral route as needed for 90 days.    Historical Provider, MD BYRD have reviewed home medications with patient personally.    Allergies:   Allergies   Allergen Reactions     Bactrim [Sulfamethoxazole-Trimethoprim] Fever     Fever of 107       Social History:     Marital Status: /Civil Union   Substance Use History:   Social History     Substance and Sexual Activity   Alcohol Use Never     Social History     Tobacco Use   Smoking Status Former   Smokeless Tobacco Never   Tobacco Comments    quit about 25 years ago     Social History     Substance and Sexual Activity   Drug Use Never       Family History:    Family History   Problem Relation Age of Onset    Asthma Mother     Emphysema Mother     Cancer Father     Asthma Brother     Cancer Brother         Physical Exam:     Vitals:   Blood Pressure: 132/67 (04/28/24 1612)  Pulse: 80 (04/28/24 1612)  Temperature: 98.9 °F (37.2 °C) (04/28/24 1612)  Respirations: 18 (04/28/24 1612)  Height: 6' (182.9 cm) (04/28/24 1612)  Weight - Scale: 74.4 kg (164 lb 0.4 oz) (04/28/24 1612)  SpO2: 94 % (04/28/24 1612)    Physical Exam  Vitals reviewed.   Constitutional:       General: He is not in acute distress.     Appearance: Normal appearance.   HENT:      Head: Normocephalic and atraumatic.   Eyes:      General: No scleral icterus.        Right eye: No discharge.         Left eye: No discharge.   Cardiovascular:      Rate and Rhythm: Normal rate and regular rhythm.      Pulses: Normal pulses.      Heart sounds: Normal heart sounds.   Pulmonary:      Effort: Pulmonary effort is normal. No respiratory distress.      Breath sounds: No wheezing or rales.      Comments: Decreased breath sounds in right lower lobe with right chest tube in place  Chest:      Chest wall: No tenderness.   Abdominal:      General: Abdomen is flat. Bowel sounds are normal. There is no distension.      Palpations: Abdomen is soft.      Tenderness: There is no abdominal tenderness.   Musculoskeletal:         General: No deformity. Normal range of motion.      Cervical back: Normal range of motion and neck supple.      Right lower leg: No edema.      Left lower leg: No edema.    Skin:     General: Skin is warm.      Coloration: Skin is not jaundiced or pale.      Findings: No rash.   Neurological:      General: No focal deficit present.      Mental Status: He is alert and oriented to person, place, and time. Mental status is at baseline.      Cranial Nerves: No cranial nerve deficit.      Motor: No weakness.   Psychiatric:         Mood and Affect: Mood normal.         Behavior: Behavior normal.         Additional Data:     Lab Results: I have personally reviewed pertinent reports.      Results from last 7 days   Lab Units 04/28/24  1018   WBC Thousand/uL 12.17*   HEMOGLOBIN g/dL 14.0   HEMATOCRIT % 42.1   PLATELETS Thousands/uL 245   SEGS PCT % 70   LYMPHO PCT % 18   MONO PCT % 9   EOS PCT % 1     Results from last 7 days   Lab Units 04/28/24  1018   SODIUM mmol/L 134*   POTASSIUM mmol/L 3.8   CHLORIDE mmol/L 98   CO2 mmol/L 26   BUN mg/dL 18   CREATININE mg/dL 1.11   ANION GAP mmol/L 10   CALCIUM mg/dL 9.6   GLUCOSE RANDOM mg/dL 274*         Results from last 7 days   Lab Units 04/28/24  1616   POC GLUCOSE mg/dl 249*               Imaging: I have personally reviewed pertinent reports.      CT chest with contrast   Final Result by Staci Shah MD (04/28 1349)      Large right pneumothorax.      Moderate emphysema.         I personally discussed this study with BRYAN VILLAFANA on 4/28/2024 1:49 PM.               Workstation performed: [id number]         XR chest 2 views    (Results Pending)   XR chest 1 view portable    (Results Pending)   XR chest pa & lateral    (Results Pending)       EKG, Pathology, and Other Studies Reviewed on Admission:   EKG: Sinus tachy    Allscripts / Epic Records Reviewed: Yes     ** Please Note: This note has been constructed using a voice recognition system. **

## 2024-04-29 ENCOUNTER — APPOINTMENT (INPATIENT)
Dept: RADIOLOGY | Facility: HOSPITAL | Age: 79
DRG: 199 | End: 2024-04-29
Payer: COMMERCIAL

## 2024-04-29 PROBLEM — J44.9 COPD (CHRONIC OBSTRUCTIVE PULMONARY DISEASE) (HCC): Status: ACTIVE | Noted: 2024-04-29

## 2024-04-29 LAB
ANION GAP SERPL CALCULATED.3IONS-SCNC: 8 MMOL/L (ref 4–13)
BUN SERPL-MCNC: 14 MG/DL (ref 5–25)
CALCIUM SERPL-MCNC: 9.2 MG/DL (ref 8.4–10.2)
CHLORIDE SERPL-SCNC: 99 MMOL/L (ref 96–108)
CO2 SERPL-SCNC: 26 MMOL/L (ref 21–32)
CREAT SERPL-MCNC: 1.04 MG/DL (ref 0.6–1.3)
ERYTHROCYTE [DISTWIDTH] IN BLOOD BY AUTOMATED COUNT: 14.8 % (ref 11.6–15.1)
GFR SERPL CREATININE-BSD FRML MDRD: 68 ML/MIN/1.73SQ M
GLUCOSE SERPL-MCNC: 160 MG/DL (ref 65–140)
GLUCOSE SERPL-MCNC: 176 MG/DL (ref 65–140)
GLUCOSE SERPL-MCNC: 209 MG/DL (ref 65–140)
GLUCOSE SERPL-MCNC: 303 MG/DL (ref 65–140)
GLUCOSE SERPL-MCNC: 315 MG/DL (ref 65–140)
HCT VFR BLD AUTO: 39.6 % (ref 36.5–49.3)
HGB BLD-MCNC: 13.2 G/DL (ref 12–17)
MCH RBC QN AUTO: 30.6 PG (ref 26.8–34.3)
MCHC RBC AUTO-ENTMCNC: 33.3 G/DL (ref 31.4–37.4)
MCV RBC AUTO: 92 FL (ref 82–98)
PLATELET # BLD AUTO: 232 THOUSANDS/UL (ref 149–390)
PMV BLD AUTO: 9.1 FL (ref 8.9–12.7)
POTASSIUM SERPL-SCNC: 4 MMOL/L (ref 3.5–5.3)
RBC # BLD AUTO: 4.31 MILLION/UL (ref 3.88–5.62)
SODIUM SERPL-SCNC: 133 MMOL/L (ref 135–147)
WBC # BLD AUTO: 9.39 THOUSAND/UL (ref 4.31–10.16)

## 2024-04-29 PROCEDURE — 71046 X-RAY EXAM CHEST 2 VIEWS: CPT

## 2024-04-29 PROCEDURE — 97167 OT EVAL HIGH COMPLEX 60 MIN: CPT

## 2024-04-29 PROCEDURE — 85027 COMPLETE CBC AUTOMATED: CPT | Performed by: INTERNAL MEDICINE

## 2024-04-29 PROCEDURE — 82948 REAGENT STRIP/BLOOD GLUCOSE: CPT

## 2024-04-29 PROCEDURE — 97163 PT EVAL HIGH COMPLEX 45 MIN: CPT

## 2024-04-29 PROCEDURE — 99223 1ST HOSP IP/OBS HIGH 75: CPT | Performed by: INTERNAL MEDICINE

## 2024-04-29 PROCEDURE — 99232 SBSQ HOSP IP/OBS MODERATE 35: CPT | Performed by: HOSPITALIST

## 2024-04-29 PROCEDURE — 80048 BASIC METABOLIC PNL TOTAL CA: CPT | Performed by: INTERNAL MEDICINE

## 2024-04-29 RX ORDER — INSULIN LISPRO 100 [IU]/ML
1-6 INJECTION, SOLUTION INTRAVENOUS; SUBCUTANEOUS
Status: DISCONTINUED | OUTPATIENT
Start: 2024-04-30 | End: 2024-04-30 | Stop reason: HOSPADM

## 2024-04-29 RX ORDER — INSULIN LISPRO 100 [IU]/ML
1-5 INJECTION, SOLUTION INTRAVENOUS; SUBCUTANEOUS
Status: DISCONTINUED | OUTPATIENT
Start: 2024-04-29 | End: 2024-04-30 | Stop reason: HOSPADM

## 2024-04-29 RX ADMIN — BUDESONIDE AND FORMOTEROL FUMARATE DIHYDRATE 2 PUFF: 160; 4.5 AEROSOL RESPIRATORY (INHALATION) at 17:23

## 2024-04-29 RX ADMIN — INSULIN LISPRO 2 UNITS: 100 INJECTION, SOLUTION INTRAVENOUS; SUBCUTANEOUS at 11:03

## 2024-04-29 RX ADMIN — MORPHINE SULFATE 2 MG: 2 INJECTION, SOLUTION INTRAMUSCULAR; INTRAVENOUS at 03:35

## 2024-04-29 RX ADMIN — MONTELUKAST 10 MG: 10 TABLET, FILM COATED ORAL at 10:19

## 2024-04-29 RX ADMIN — ENOXAPARIN SODIUM 40 MG: 40 INJECTION SUBCUTANEOUS at 10:19

## 2024-04-29 RX ADMIN — ATENOLOL 25 MG: 25 TABLET ORAL at 10:19

## 2024-04-29 RX ADMIN — INSULIN LISPRO 4 UNITS: 100 INJECTION, SOLUTION INTRAVENOUS; SUBCUTANEOUS at 16:10

## 2024-04-29 RX ADMIN — PREDNISONE 10 MG: 10 TABLET ORAL at 10:19

## 2024-04-29 RX ADMIN — AMLODIPINE BESYLATE 5 MG: 5 TABLET ORAL at 10:27

## 2024-04-29 RX ADMIN — LISINOPRIL 20 MG: 20 TABLET ORAL at 10:19

## 2024-04-29 RX ADMIN — BUDESONIDE AND FORMOTEROL FUMARATE DIHYDRATE 2 PUFF: 160; 4.5 AEROSOL RESPIRATORY (INHALATION) at 10:22

## 2024-04-29 RX ADMIN — LISINOPRIL 20 MG: 20 TABLET ORAL at 21:44

## 2024-04-29 RX ADMIN — FAMOTIDINE 20 MG: 20 TABLET, FILM COATED ORAL at 10:19

## 2024-04-29 RX ADMIN — INSULIN LISPRO 1 UNITS: 100 INJECTION, SOLUTION INTRAVENOUS; SUBCUTANEOUS at 07:59

## 2024-04-29 RX ADMIN — AMLODIPINE BESYLATE 5 MG: 5 TABLET ORAL at 21:44

## 2024-04-29 RX ADMIN — INSULIN LISPRO 3 UNITS: 100 INJECTION, SOLUTION INTRAVENOUS; SUBCUTANEOUS at 21:50

## 2024-04-29 NOTE — CASE MANAGEMENT
Case Management Assessment & Discharge Planning Note    Patient name Gold Van  Location /-01 MRN [id number]  : 1945 Date 2024       Current Admission Date: 2024  Current Admission Diagnosis:Secondary spontaneous pneumothorax   Patient Active Problem List    Diagnosis Date Noted    COPD (chronic obstructive pulmonary disease) (HCC) 2024    Secondary spontaneous pneumothorax 2024    Acute respiratory failure with hypoxia (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) 10/23/2017    Essential hypertension 10/23/2017    Asthma 10/23/2017    Hyperlipidemia 10/23/2017      LOS (days): 1  Geometric Mean LOS (GMLOS) (days):   Days to GMLOS:     OBJECTIVE:    Risk of Unplanned Readmission Score: 12.62         Current admission status: Inpatient  Referral Reason: Other (Discharge planning)    Preferred Pharmacy:   [facility] - [facility] [id number] [facility] [address]  Phone: [phone] Fax: [phone]    [facility] - [place], [place] - [address]  [address] [facility] [address]  Phone: [phone] Fax: [phone]    Primary Care Provider: Shayne Diaz MD    Primary Insurance: [employer] [facility]  Secondary Insurance:     ASSESSMENT:  Active Health Care Proxies    There are no active Health Care Proxies on file.       Advance Directives  Does patient have a Health Care POA?: Yes  Does patient have Advance Directives?: Yes  Advance Directives: Living will, Power of  for health care  Primary Contact: Mya Van - Spouse         Readmission Root Cause  30 Day Readmission:  No    Patient Information  Admitted from:: Home  Mental Status: Alert  During Assessment patient was accompanied by: Not accompanied during assessment  Assessment information provided by:: Patient  Primary Caregiver: Self  Support Systems: Spouse/significant other  County of Residence: [place]  What city do you live in?: [place]  Home entry access options. Select all that apply.: Stairs  Number of steps to enter home.:  (13)  Do the steps have railings?: Yes  Type of Current Residence: 2 story home  Upon entering residence, is there a bedroom on the main floor (no further steps)?: No  A bedroom is located on the following floor levels of residence (select all that apply):: 2nd Floor  Upon entering residence, is there a bathroom on the main floor (no further steps)?: No  Indicate which floors of current residence have a bathroom (select all the apply):: 2nd Floor  Number of steps to 2nd floor from main floor: One Flight  Living Arrangements: Lives w/ Spouse/significant other  Is patient a ?: No    Activities of Daily Living Prior to Admission  Functional Status: Independent  Completes ADLs independently?: Yes  Ambulates independently?: No  Level of ambulatory dependence: Assistance  Does patient use assisted devices?: Yes (uses walking stick in the community)  Does patient currently own DME?: Yes  Does patient have a history of Outpatient Therapy (PT/OT)?: Yes  Does the patient have a history of Short-Term Rehab?: No  Does patient have a history of HHC?: Yes  Does patient currently have HHC?: No         Patient Information Continued  Income Source: Pension/FDC  Does patient have prescription coverage?: Yes  Does patient receive dialysis treatments?: No  Does patient have a history of substance abuse?: No  Does patient have a history of Mental Health Diagnosis?: No         Means of Transportation  Means of Transport to Appts:: Drives Self      Social Determinants of Health (SDOH)      Flowsheet Row  Most Recent Value   Housing Stability    In the last 12 months, was there a time when you were not able to pay the mortgage or rent on time? N   In the last 12 months, how many places have you lived? 1   In the last 12 months, was there a time when you did not have a steady place to sleep or slept in a shelter (including now)? N   Transportation Needs    In the past 12 months, has lack of transportation kept you from medical appointments or from getting medications? no   In the past 12 months, has lack of transportation kept you from meetings, work, or from getting things needed for daily living? No   Food Insecurity    Within the past 12 months, you worried that your food would run out before you got the money to buy more. Never true   Within the past 12 months, the food you bought just didn't last and you didn't have money to get more. Never true   Utilities    In the past 12 months has the electric, gas, oil, or water company threatened to shut off services in your home? No            DISCHARGE DETAILS:    Discharge planning discussed with:: Patient  Freedom of Choice: Yes     CM contacted family/caregiver?: Yes  Were Treatment Team discharge recommendations reviewed with patient/caregiver?: Yes  Did patient/caregiver verbalize understanding of patient care needs?: Yes  Were patient/caregiver advised of the risks associated with not following Treatment Team discharge recommendations?: Yes    Contacts  Patient Contacts: Mya - spouse  Relationship to Patient:: Family  Contact Method: Phone  Phone Number: [phone]  Reason/Outcome: Discharge Planning    Requested Home Health Care         Is the patient interested in HHC at discharge?: No    DME Referral Provided  Referral made for DME?: No    Other Referral/Resources/Interventions Provided:  Interventions: Outpatient PT  Referral Comments: Minimum resource intensity recommended by therapy. CM will provide OP therapy resources to pt.         Treatment Team  Recommendation: Home  Discharge Destination Plan:: Home  Transport at Discharge : Family

## 2024-04-29 NOTE — PLAN OF CARE
Problem: PAIN - ADULT  Goal: Verbalizes/displays adequate comfort level or baseline comfort level  Description: Interventions:  - Encourage patient to monitor pain and request assistance  - Assess pain using appropriate pain scale  - Administer analgesics based on type and severity of pain and evaluate response  - Implement non-pharmacological measures as appropriate and evaluate response  - Consider cultural and social influences on pain and pain management  - Notify physician/advanced practitioner if interventions unsuccessful or patient reports new pain  Outcome: Progressing     Problem: INFECTION - ADULT  Goal: Absence or prevention of progression during hospitalization  Description: INTERVENTIONS:  - Assess and monitor for signs and symptoms of infection  - Monitor lab/diagnostic results  - Monitor all insertion sites, i.e. indwelling lines, tubes, and drains  - Monitor endotracheal if appropriate and nasal secretions for changes in amount and color  - [place] appropriate cooling/warming therapies per order  - Administer medications as ordered  - Instruct and encourage patient and family to use good hand hygiene technique  - Identify and instruct in appropriate isolation precautions for identified infection/condition  Outcome: Progressing  Goal: Absence of fever/infection during neutropenic period  Description: INTERVENTIONS:  - Monitor WBC    Outcome: Progressing     Problem: SAFETY ADULT  Goal: Patient will remain free of falls  Description: INTERVENTIONS:  - Educate patient/family on patient safety including physical limitations  - Instruct patient to call for assistance with activity   - Consult OT/PT to assist with strengthening/mobility   - Keep Call bell within reach  - Keep bed low and locked with side rails adjusted as appropriate  - Keep care items and personal belongings within reach  - Initiate and maintain comfort rounds  - Make Fall Risk Sign visible to staff  - Offer Toileting every 2 Hours,  in advance of need  - Initiate/Maintain bed alarm  - Obtain necessary fall risk management equipment: walker   - Apply yellow socks and bracelet for high fall risk patients  - Consider moving patient to room near nurses station  Outcome: Progressing  Goal: Maintain or return to baseline ADL function  Description: INTERVENTIONS:  -  Assess patient's ability to carry out ADLs; assess patient's baseline for ADL function and identify physical deficits which impact ability to perform ADLs (bathing, care of mouth/teeth, toileting, grooming, dressing, etc.)  - Assess/evaluate cause of self-care deficits   - Assess range of motion  - Assess patient's mobility; develop plan if impaired  - Assess patient's need for assistive devices and provide as appropriate  - Encourage maximum independence but intervene and supervise when necessary  - Involve family in performance of ADLs  - Assess for home care needs following discharge   - Consider OT consult to assist with ADL evaluation and planning for discharge  - Provide patient education as appropriate  Outcome: Progressing  Goal: Maintains/Returns to pre admission functional level  Description: INTERVENTIONS:  - Perform AM-PAC 6 Click Basic Mobility/ Daily Activity assessment daily.  - Set and communicate daily mobility goal to care team and patient/family/caregiver.   - Collaborate with rehabilitation services on mobility goals if consulted  - Perform Range of Motion 3 times a day.  - Reposition patient every 2 hours.  - Dangle patient 3 times a day  - Stand patient 3 times a day  - Ambulate patient 3 times a day  - Out of bed to chair 3 times a day   - Out of bed for meals 3 times a day  - Out of bed for toileting  - Record patient progress and toleration of activity level   Outcome: Progressing     Problem: DISCHARGE PLANNING  Goal: Discharge to home or other facility with appropriate resources  Description: INTERVENTIONS:  - Identify barriers to discharge w/patient and  caregiver  - Arrange for needed discharge resources and transportation as appropriate  - Identify discharge learning needs (meds, wound care, etc.)  - Arrange for interpretive services to assist at discharge as needed  - Refer to Case Management Department for coordinating discharge planning if the patient needs post-hospital services based on physician/advanced practitioner order or complex needs related to functional status, cognitive ability, or social support system  Outcome: Progressing     Problem: Knowledge Deficit  Goal: Patient/family/caregiver demonstrates understanding of disease process, treatment plan, medications, and discharge instructions  Description: Complete learning assessment and assess knowledge base.  Interventions:  - Provide teaching at level of understanding  - Provide teaching via preferred learning methods  Outcome: Progressing     Problem: RESPIRATORY - ADULT  Goal: Achieves optimal ventilation and oxygenation  Description: INTERVENTIONS:  - Assess for changes in respiratory status  - Assess for changes in mentation and behavior  - Position to facilitate oxygenation and minimize respiratory effort  - Oxygen administered by appropriate delivery if ordered  - Initiate smoking cessation education as indicated  - Encourage broncho-pulmonary hygiene including cough, deep breathe, Incentive Spirometry  - Assess the need for suctioning and aspirate as needed  - Assess and instruct to report SOB or any respiratory difficulty  - Respiratory Therapy support as indicated  Outcome: Progressing     Problem: SKIN/TISSUE INTEGRITY - ADULT  Goal: Skin Integrity remains intact(Skin Breakdown Prevention)  Description: Assess:  -Perform Sae assessment every shift   -Clean and moisturize skin every shift   -Inspect skin when repositioning, toileting, and assisting with ADLS  -Assess extremities for adequate circulation and sensation     Bed Management:  -Have minimal linens on bed & keep smooth,  unwrinkled  -Change linens as needed when moist or perspiring  -Avoid sitting or lying in one position for more than 2 hours while in bed  -Keep HOB at 30 degrees     Toileting:  -Offer bedside commode  -Assess for incontinence every 2 hours     Activity:  -Mobilize patient 3 times a day  -Encourage activity and walks on unit  -Encourage or provide ROM exercises   -Turn and reposition patient every 2 Hours  -Use appropriate equipment to lift or move patient in bed  -Instruct/ Assist with weight shifting every 30 minutes when out of bed in chair  -Consider limitation of chair time 2 hour intervals    Skin Care:  -Avoid use of baby powder, tape, friction and shearing, hot water or constrictive clothing  -Relieve pressure over bony prominences using cushions  -Do not massage red bony areas    Next Steps:  -Teach patient strategies to minimize risks such as q2 turns    -Consider consults to  interdisciplinary teams such as wound nurse   Outcome: Progressing  Goal: Incision(s), wounds(s) or drain site(s) healing without S/S of infection  Description: INTERVENTIONS  - Assess and document dressing, incision, wound bed, drain sites and surrounding tissue  - Provide patient and family education  - Perform skin care/dressing changes every shift   Outcome: Progressing

## 2024-04-29 NOTE — PLAN OF CARE
Problem: PHYSICAL THERAPY ADULT  Goal: Performs mobility at highest level of function for planned discharge setting.  See evaluation for individualized goals.  Description: Treatment/Interventions: Functional transfer training, Therapeutic exercise, Endurance training, Gait training, Bed mobility, Equipment eval/education, Elevations  Equipment Recommended:  (walking stick)       See flowsheet documentation for full assessment, interventions and recommendations.  Outcome: Progressing  Note: Prognosis: Good     Assessment: Pt is 78 y.o. male seen for PT evaluation s/p admit to [facility] on 4/28/2024 w/ Secondary spontaneous pneumothorax. PT consulted to assess pt's functional mobility and d/c needs. Order placed for PT eval and tx, w/ up and OOB as tolerated order. Pt agreeable to PT  session upon arrival, pt found supine in bed.  PTA, pt was independent w/ all functional mobility w/ walking stick, has 13 NEERU, lives w/ spouse in 2 level home, and retired.  Pt to benefit from continued PT tx to address deficits and maximize level of functional independent mobility and consistency. Upon conclusion pt  seated in recliner. Complexity: Comorbidities affecting pt's physical performance at time of assessment include: COPD, DM, htn, and respiratory failure. Personal factors affecting pt at time of IE include: limited mobility, lives in multistory home, ambulating with assistive device, and steps to enter home. Please find objective findings from PT assessment regarding body systems outlined above with impairments and limitations including impaired balance, decreased endurance, pain, decreased activity tolerance, and decreased functional mobility tolerance.  Pt's clinical presentation is currently unstable/unpredictable seen in pt's presentation of abnormal WBC count, abnormal sodium levels, abnormal blood sugar levels, pain, polypharmacy, and recent  procedure and chest tube insertion . The patient's AM-PAC Basic  Mobility Inpatient Short Form Raw Score is 18.  Based on patient presentations and impairments, pt would most appropriately benefit from Level 3 resource intensity upon discharge. Please also refer to the recommendation of the Physical Therapist for safe discharge planning. RN verbalized pt appropriate for PT session. Pt seen as a co-eval with OT due to the patient's co-morbidities, clinically unstable presentation, and present impairments which are a regression from the patient's baseline.  Barriers to Discharge: Inaccessible home environment     Rehab Resource Intensity Level, PT: III (Minimum Resource Intensity)    See flowsheet documentation for full assessment.

## 2024-04-29 NOTE — ASSESSMENT & PLAN NOTE
Patient has a history of panlobular emphysema  Currently without exacerbation  Continue Symbicort  Continue daily maintenance prednisone  Continue albuterol, and respiratory protocol

## 2024-04-29 NOTE — OCCUPATIONAL THERAPY NOTE
Occupational Therapy Evaluation     Patient Name: Gold Van  Today's Date: 4/29/2024  Problem List  Principal Problem:    Secondary spontaneous pneumothorax  Active Problems:    Type 2 diabetes mellitus with complication, without long-term current use of insulin (HCC)    Essential hypertension    Asthma    Pruritus    Acute respiratory failure with hypoxia (HCC)    Past Medical History  Past Medical History:   Diagnosis Date    Asthma     Diabetes mellitus (HCC)     Environmental allergies     Hyperlipidemia     Hypertension     Macular degeneration 07/13/2020    right eye    Orthostatic hypotension     Osteopenia     Sinusitis      Past Surgical History  Past Surgical History:   Procedure Laterality Date    COLONOSCOPY      KNEE CARTILAGE SURGERY Right 1964    VASECTOMY      WISDOM TOOTH EXTRACTION      x4      04/29/24 1100   OT Last Visit   OT Visit Date 04/29/24   Note Type   Note type Evaluation   Additional Comments Nsg staff verbally cleared pt for OT evaluation.  Pt received  supine in bed c HOB semi-elevated on this date reporting 5/10 pain @ R chest + is agreeable to OT session @ this time. @ exit, pt remains in  seated in bedside recliner c all lines intact, all needs met, call bell in hand + nsg aware of location/disposition.   Pain Assessment   Pain Assessment Tool 0-10   Pain Score 5   Pain Location/Orientation Orientation: Right;Location: Chest   Pain Onset/Description Onset: Ongoing   Patient's Stated Pain Goal No pain   Hospital Pain Intervention(s) Medication (See MAR);Rest   Restrictions/Precautions   Other Precautions Fall Risk;Pain;Multiple lines  (3L02, chest tube)   Home Living   Type of Home House   Home Layout Two level;Bed/bath upstairs;Stairs to enter with rails  (13 NEERU c RHR ascending, no bathroom on 1st floor)   Bathroom Shower/Tub Walk-in shower   Bathroom Toilet Raised   Bathroom Equipment Grab bars in shower   Home Equipment   (Walking stick- in community, no AD in home)  "  Prior Function   Level of [place] Independent with ADLs;Independent with functional mobility  (Wife-laundry, shared grocery shopping/cooking/cleaning)   Lives With Spouse   Receives Help From Family   IADLs Independent with driving;Independent with meal prep;Family/Friend/Other provides medication management   Falls in the last 6 months 0   Vocational Retired  ()   Lifestyle   Autonomy Pt lives in  2 story home c wife + @ baseline, performs ADLs  I'ly, IADLs I'ly + fxl mobility I'ly c walking stick. (+) . Pt is retired.   Reciprocal Relationships Wife   Service to Others Family relations   Subjective   Subjective \"it hurts here because of the chest tube\"   ADL   Eating Assistance 7  Independent   Grooming Assistance 7  Independent   UB Bathing Assistance 5  Supervision/Setup   UB Bathing Deficit   (new chest tube site)   LB Bathing Assistance 3  Moderate Assistance   LB Bathing Deficit   (precaution limiting bend/reach-chest tube)   UB Dressing Assistance 7  Independent   LB Dressing Assistance 3  Moderate Assistance  (precaution limiting bend/reach-chest tube)   Toileting Assistance  4  Minimal Assistance   Additional Comments Given fxl performance skills + medical complexity, therapist suspecting via clinical judgement + skilled analysis; pt currently requires stated assist above to perform each area of ADL d/t limitations including: pain and newly placed chest tube.   Bed Mobility   Supine to Sit 5  Supervision   Additional items Assist x 1;HOB elevated   Additional Comments DNT sit-sup; pt transitioned from EOB-bedside recliner.   Transfers   Sit to Stand   (CGA)   Additional items Verbal cues;Increased time required   Stand to Sit 5  Supervision   Additional items Increased time required;Armrests;Verbal cues  (for UE placement)   Functional Mobility   Additional Comments Pt performed short distance ADL related fxl mobility from bed-bedside recliner c CGA + walking stick simulating " toileting distances.   No overt LOB demonstrated + pt c/o pain /  denies SOB/ denies dizziness during transitional movements. Pt  moderately below baseline c abilities related to ADL-focused fxl mobility. G safety awareness noted while navigating t/o room. Transitions to bedside recliner for remainder of session.  O2 @ 94%, 3LO2 via n/c.   Balance   Static Sitting Good   Dynamic Sitting Fair +   Static Standing Fair   Dynamic Standing Fair -   Ambulatory Fair -   Activity Tolerance   Activity Tolerance Patient limited by pain   Medical Staff Made Aware PT/OT co-eval on this date d/t medical complexity, ambulatory dysfunction c high fall risk, various impeding lines + requirement for skilled interdisciplinary analysis of appropriate d/c recommendations. PT/OT POC/goals I'ly determined per respective discipline. (Leeanna)   Nurse Made Aware Medical staff made aware of current fxn, recommendations for d/c planning, fall risk + pt's location upon exit. (Molly)   RUE Assessment   RUE Assessment   (DNT;)   LUE Assessment   LUE Assessment WFL   Hand Function   Gross Motor Coordination Functional   Fine Motor Coordination Functional   Vision-Basic Assessment   Current Vision Wears glasses all the time   Psychosocial   Psychosocial (WDL) WDL   Cognition   Overall Cognitive Status WFL   Arousal/Participation Alert;Responsive;Cooperative   Attention Within functional limits   Orientation Level Oriented X4   Memory Within functional limits   Following Commands Follows all commands and directions without difficulty   Assessment   Limitation Decreased ADL status;Decreased self-care trans;Decreased high-level ADLs;Decreased endurance   Prognosis Good   Assessment Patient is a 78 y.o. male seen for OT evaluation s/p admit to  [facility] on 4/28/2024 w/Secondary spontaneous pneumothorax + commorbidities/PMHx (as listed in medical record) affecting patient's functional performance c ADL tasks at time of assessment. OT orders  placed for evaluation and treatment to assess pt's ADLs, cognitive status + performance during functional tasks in order to formulate appropriate d/c recommendations.     Therapist performed at least two patient identifiers during session including name and wristband. Personal factors affecting patient at time of initial evaluation include: step(s) to enter environment, inability to perform ADLs, inability to ambulate household distances, and decreased initiation and engagement.   Pt's clinical presentation is currently unstable/unpredictable given new onset deficits that effect pt's occupational performance and ability to safely return to OF including decrease activity tolerance, decrease standing balance, decrease performance during ADL tasks, decrease generalized strength, decrease activity engagement, and decrease performance during functional transfers combined with medical complications of pain impacting overall mobility status, abnormal renal lab values, abnormal blood sugars, abnormal sodium values, low SpO2 values, new onset O2 use, and need for input for mobility technique/safety. This evaluation required an extensive review of medical and/or therapy records and additional review of physical, cognitive and psychosocial history related to functional performance. Based upon functional performance deficits and assessments, this evaluation has been identified as a  high complexity evaluation.      Patient to benefit from continued Occupational Therapy treatment while in the hospital to address aforementioned deficits and maximize level of functional independence with ADLs and functional mobility. Pt currently demonstrates WFL ability to collaboratively engage in d/c planning.   Plan   Treatment Interventions ADL retraining;Functional transfer training;Endurance training;UE strengthening/ROM;Patient/family training;Equipment evaluation/education;Compensatory technique education;Energy  conservation;Activityengagement   Goal Expiration Date 05/09/24   OT Treatment Day 0   OT Frequency 3-5x/wk   Discharge Recommendation   Rehab Resource Intensity Level, OT II (Moderate Resource Intensity)   AM-PAC Daily Activity Inpatient   Lower Body Dressing 2   Bathing 2   Toileting 2   Upper Body Dressing 3   Grooming 4   Eating 4   Daily Activity Raw Score 17   Daily Activity Standardized Score (Calc for Raw Score >=11) 37.26   AM-PAC Applied Cognition Inpatient   Following a Speech/Presentation 4   Understanding Ordinary Conversation 4   Taking Medications 4   Remembering Where Things Are Placed or Put Away 4   Remembering List of 4-5 Errands 4   Taking Care of Complicated Tasks 4   Applied Cognition Raw Score 24   Applied Cognition Standardized Score 62.21   Barthel Index   Feeding 10   Bathing 0   Grooming Score 5   Dressing Score 5   Bladder Score 10   Bowels Score 10   Toilet Use Score 5   Transfers (Bed/Chair) Score 10   Mobility (Level Surface) Score 10   Stairs Score 5   Barthel Index Score 70   End of Consult   Patient Position at End of Consult Bedside chair;All needs within reach   Nurse Communication Nurse aware of consult       The patient's raw score on the AM-PAC Daily Activity inpatient short form is 17, standardized score is 37.26, less than 39.4. Please refer to the recommendation of the Occupational Therapist for safe DC planning.    Resource Intensity Recommendation: Level II (Moderate Resource Intensity)        GOALS:    *ADL transfers with (I) for inc'd independence with ADLs/purposeful tasks    *UB ADL with (I) for inc'd independence with self cares    *LB ADL with (I) using AE prn for inc'd independence with self cares    *Toileting with (I) for clothing management and hygiene for return to PLOF with personal care    *Increase stand tolerance x 5  m for inc'd tolerance with standing purposeful tasks    *Participate in 10m UE therex to increase overall stamina/activity tolerance for  purposeful tasks    *Bed mobility- (I) for inc'd independence to manage own comfort and initiate EOB & OOB purposeful tasks    *Patient will verbalize 3 safety awareness/ principles to prevent falls in the home setting.     *Patient will verbalize and demonstrate use of energy conservation/deep breathing techniques and work simplification skills during functional activities with no verbal cues.     *Patient will increase OOB/sitting tolerance to 2-4 hours per day to increase participation in self-care and leisure tasks with no s/s of exertion.     *Patient will engage in ongoing cognitive assessment to assist with safe discharge planning/recommendations.     *Pt will verbalize and demonstrate understanding of post-op movement precautions 100% (if applicable) in tx sessions for increased safety and functional mobility.      *Pt will demonstrate use of long handled AE (if appropriate) during 100% of tx sessions for increased ADL safety and independence following D/C     Bing Persaud OTR/L

## 2024-04-29 NOTE — PHYSICAL THERAPY NOTE
PHYSICAL THERAPY EVALUATION  NAME:  Gold Van  DATE: 04/29/24    AGE:   78 y.o.  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    Non-recurrent bilateral inguinal hernia without obstruction or gangrene    Pruritus    Secondary spontaneous pneumothorax    Acute respiratory failure with hypoxia (HCC)    COPD (chronic obstructive pulmonary disease) (HCC)       Past Medical History  Past Medical History:   Diagnosis Date    Asthma     Diabetes mellitus (HCC)     Environmental allergies     Hyperlipidemia     Hypertension     Macular degeneration 07/13/2020    right eye    Orthostatic hypotension     Osteopenia     Sinusitis        Past Surgical History  Past Surgical History:   Procedure Laterality Date    COLONOSCOPY      KNEE CARTILAGE SURGERY Right 1964    VASECTOMY      WISDOM TOOTH EXTRACTION      x4       Length Of Stay: 1  Performed at least 2 patient identifiers during session: Name and ID bracelet       04/29/24 1104   PT Last Visit   PT Visit Date 04/29/24   Note Type   Note type Evaluation   Pain Assessment   Pain Assessment Tool 0-10   Pain Score 5   Pain Location/Orientation Orientation: Right;Location: Chest   Pain Onset/Description Onset: Ongoing   Hospital Pain Intervention(s) Medication (See MAR);Rest   Restrictions/Precautions   Weight Bearing Precautions Per Order No   Other Precautions Fall Risk;Pain;Multiple lines  (chest tube)   Home Living   Type of Home House   Home Layout Two level;Bed/bath upstairs;Stairs to enter with rails  (13 NEERU c RHR ascending, no bathroom on 1st floor)   Bathroom Shower/Tub  Walk-in shower   Bathroom Toilet Raised   Bathroom Equipment Grab bars in shower   Home Equipment   (Walking stick- in community, no AD in home)   Prior Function   Level of [place] Independent with ADLs;Independent with functional mobility  (Wife-laundry, shared grocery shopping/cooking/cleaning)   Lives With Spouse   Receives Help From Family   IADLs Independent with driving;Independent with meal prep;Family/Friend/Other provides medication management   Falls in the last 6 months 0   Vocational Retired  ()   Comments no braces or orthos reported   General   Family/Caregiver Present No   Cognition   Overall Cognitive Status WFL   Attention Within functional limits   Orientation Level Oriented X4   Memory Within functional limits   Following Commands Follows all commands and directions without difficulty   RUE Assessment   RUE Assessment   (DNT;)   LUE Assessment   LUE Assessment WFL   RLE Assessment   RLE Assessment WFL   LLE Assessment   LLE Assessment WFL   Vision-Basic Assessment   Current Vision Wears glasses all the time   Coordination   Sensation WFL   Bed Mobility   Supine to Sit 5  Supervision   Additional items Assist x 1;HOB elevated   Additional Comments pt denied dizziness with transitional movement   Transfers   Sit to Stand   (CGA)   Additional items Verbal cues;Increased time required   Stand to Sit 5  Supervision   Additional items Armrests;Increased time required;Verbal cues   Additional Comments pt required cues for proper hand placement  (assistance for chest tube line)   Ambulation/Elevation   Gait pattern Improper Weight shift;Antalgic;Decreased foot clearance   Gait Assistance   (CGA)   Additional items Verbal cues   Assistive Device   (walking stick)   Distance 12 ft   Balance   Static Sitting Good   Dynamic Sitting Fair +   Static Standing Fair   Dynamic Standing Poor +   Ambulatory Poor +   Endurance Deficit   Endurance Deficit Yes   Endurance Deficit Description pt  reported fatigue with activity   Activity Tolerance   Activity Tolerance Patient limited by pain   Assessment   Prognosis Good   Assessment Pt is 78 y.o. male seen for PT evaluation s/p admit to [facility] on 4/28/2024 w/ Secondary spontaneous pneumothorax. PT consulted to assess pt's functional mobility and d/c needs. Order placed for PT eval and tx, w/ up and OOB as tolerated order. Pt agreeable to PT  session upon arrival, pt found supine in bed.  PTA, pt was independent w/ all functional mobility w/ walking stick, has 13 NEERU, lives w/ spouse in 2 level home, and retired.  Pt to benefit from continued PT tx to address deficits and maximize level of functional independent mobility and consistency. Upon conclusion pt  seated in recliner. Complexity: Comorbidities affecting pt's physical performance at time of assessment include: COPD, DM, htn, and respiratory failure. Personal factors affecting pt at time of IE include: limited mobility, lives in multistory home, ambulating with assistive device, and steps to enter home. Please find objective findings from PT assessment regarding body systems outlined above with impairments and limitations including impaired balance, decreased endurance, pain, decreased activity tolerance, and decreased functional mobility tolerance.  Pt's clinical presentation is currently unstable/unpredictable seen in pt's presentation of abnormal WBC count, abnormal sodium levels, abnormal blood sugar levels, pain, polypharmacy, and recent  procedure and chest tube insertion . The patient's AM-PAC Basic Mobility Inpatient Short Form Raw Score is 18.  Based on patient presentations and impairments, pt would most appropriately benefit from Level 3 resource intensity upon discharge. Please also refer to the recommendation of the Physical Therapist for safe discharge planning. RN verbalized pt appropriate for PT session. Pt seen as a co-eval with OT due to the patient's co-morbidities,  clinically unstable presentation, and present impairments which are a regression from the patient's baseline.   Barriers to Discharge Inaccessible home environment   Goals   Patient Goals to get rid of the chest tube   LTG Expiration Date 05/09/24   Long Term Goal #1 Pt will: Perform bed mobility tasks to modified I to improve ease of bed mobility. Perform transfers to modified I to improve ease of transfers. Perform ambulation with MI for 250 feet to increase Indep in home environment. Increase dynamic standing balance to F+ to decrease fall risk. Increase OOB activity tolerance to 10 minutes without s/s of exertion to decrease fall risk. Navigate up and down 13  steps with MI so patient can enter and exit home.   Plan   Treatment/Interventions Functional transfer training;Therapeutic exercise;Endurance training;Gait training;Bed mobility;Equipment eval/education;Elevations   PT Frequency 3-5x/wk   Discharge Recommendation   Rehab Resource Intensity Level, PT III (Minimum Resource Intensity)   Equipment Recommended   (walking stick)   AM-PAC Basic Mobility Inpatient   Turning in Flat Bed Without Bedrails 3   Lying on Back to Sitting on Edge of Flat Bed Without Bedrails 3   Moving Bed to Chair 3   Standing Up From Chair Using Arms 3   Walk in Room 3   Climb 3-5 Stairs With Railing 3   Basic Mobility Inpatient Raw Score 18   Basic Mobility Standardized Score 41.05   [facility] Highest Level Of Mobility   -HLM Goal 6: Walk 10 steps or more   -HLM Achieved 6: Walk 10 steps or more   Barthel Index   Grooming Score 5   Dressing Score 5   Toilet Use Score 5   Transfers (Bed/Chair) Score 10   Mobility (Level Surface) Score 10       Time In: 1104  Time Out: 1118  Total Evaluation Minutes: 14    Leeanna Henao, PT

## 2024-04-29 NOTE — PLAN OF CARE
Problem: PAIN - ADULT  Goal: Verbalizes/displays adequate comfort level or baseline comfort level  Description: Interventions:  - Encourage patient to monitor pain and request assistance  - Assess pain using appropriate pain scale  - Administer analgesics based on type and severity of pain and evaluate response  - Implement non-pharmacological measures as appropriate and evaluate response  - Consider cultural and social influences on pain and pain management  - Notify physician/advanced practitioner if interventions unsuccessful or patient reports new pain  Outcome: Progressing     Problem: INFECTION - ADULT  Goal: Absence or prevention of progression during hospitalization  Description: INTERVENTIONS:  - Assess and monitor for signs and symptoms of infection  - Monitor lab/diagnostic results  - Monitor all insertion sites, i.e. indwelling lines, tubes, and drains  - Monitor endotracheal if appropriate and nasal secretions for changes in amount and color  - [place] appropriate cooling/warming therapies per order  - Administer medications as ordered  - Instruct and encourage patient and family to use good hand hygiene technique  - Identify and instruct in appropriate isolation precautions for identified infection/condition  Outcome: Progressing  Goal: Absence of fever/infection during neutropenic period  Description: INTERVENTIONS:  - Monitor WBC    Outcome: Progressing

## 2024-04-29 NOTE — CONSULTS
Pulmonary Consultation   Gold Van 78 y.o. male MRN: [id number]   -01 Encounter: [id number]      Reason for consultation:   Pneumothorax      Requesting physician:   Ronnell House MD      Impressions:   Right-sided spontaneous secondary pneumothorax.  Status post chest tube placement  Chronic obstructive pulmonary disease.  History of tobacco use.    Recommendations:  Chest tube to waterseal.  Chest x-ray PA and lateral in the morning.  Symbicort 160/4.5, 2 inhalations twice a day.  Albuterol rescue inhaler 2 inhalations 4 times a day as needed.    Discussed with Dr. House.      History of Present Illness   HPI:  Gold Van is a 78 y.o. male who was admitted yesterday because of right-sided pneumothorax.  The patient stated that he has chronic obstructive pulmonary disease diagnosed by his family doctor.  He had a PFT done years ago.  He does not recall where did he have them done at.  Patient stated over the last week or so he was coughing more than his usual.  He thought it is because of the allergies.  Yesterday morning he developed pressure in his chest and became concerned.  He presented to the emergency room.  He was evaluated and was found to have a large right-sided pneumothorax on the CT of the chest.  A chest tube was placed and the pneumothorax improved.  Since then the chest tube was to suction.  Patient feels he is back to his baseline.  Cough has improved.    Review of systems:  12 point review of systems was completed and was otherwise negative except as listed in HPI.      Historical Information   Past Medical History:   Diagnosis Date    Asthma     Diabetes mellitus (HCC)     Environmental allergies     Hyperlipidemia     Hypertension     Macular degeneration 07/13/2020    right eye    Orthostatic hypotension     Osteopenia     Sinusitis      Past Surgical History:   Procedure Laterality Date    COLONOSCOPY      KNEE CARTILAGE SURGERY Right 1964    VASECTOMY      WISDOM TOOTH  EXTRACTION      x4     Family History   Problem Relation Age of Onset    Asthma Mother     Emphysema Mother     Cancer Father     Asthma Brother     Cancer Brother        Family History:  His mother had emphysema.  She was a heavy smoker.    Social History:  The patient is  and lives with his wife.  He has over 30-pack-year smoking history and quit about 26 years ago.  He was a .      Meds/Allergies   Current Facility-Administered Medications   Medication Dose Route Frequency    acetaminophen (TYLENOL) tablet 650 mg  650 mg Oral Q6H PRN    albuterol (PROVENTIL HFA,VENTOLIN HFA) inhaler 1 puff  1 puff Inhalation Q6H PRN    amLODIPine (NORVASC) tablet 5 mg  5 mg Oral BID    atenolol (TENORMIN) tablet 25 mg  25 mg Oral Daily    budesonide-formoterol (SYMBICORT) 160-4.5 mcg/act inhaler 2 puff  2 puff Inhalation BID    enoxaparin (LOVENOX) subcutaneous injection 40 mg  40 mg Subcutaneous Daily    famotidine (PEPCID) tablet 20 mg  20 mg Oral Daily    HYDROcodone-acetaminophen (NORCO) 5-325 mg per tablet 1 tablet  1 tablet Oral Q6H PRN    insulin lispro (HumALOG/ADMELOG) 100 units/mL subcutaneous injection 1-6 Units  1-6 Units Subcutaneous TID AC    lisinopril (ZESTRIL) tablet 20 mg  20 mg Oral BID    LORazepam (ATIVAN) tablet 0.5 mg  0.5 mg Oral HS PRN    montelukast (SINGULAIR) tablet 10 mg  10 mg Oral Daily    morphine injection 2 mg  2 mg Intravenous Q4H PRN    ondansetron (ZOFRAN) injection 4 mg  4 mg Intravenous Q6H PRN    predniSONE tablet 10 mg  10 mg Oral Daily    sodium chloride (PF) 0.9 % injection 3 mL  3 mL Intravenous Q1H PRN     Medications Prior to Admission   Medication    albuterol (PROVENTIL HFA,VENTOLIN HFA) 90 mcg/act inhaler    amLODIPine (NORVASC) 5 mg tablet    atenolol (TENORMIN) 25 mg tablet    Budeson-Glycopyrrol-Formoterol (Breztri Aerosphere) 160-9-4.8 MCG/ACT AERO    famotidine (PEPCID) 20 mg tablet    glipiZIDE (GLUCOTROL XL) 2.5 mg 24 hr tablet    lisinopril (ZESTRIL)  20 mg tablet    LORazepam (ATIVAN) 0.5 mg tablet    metFORMIN (GLUCOPHAGE) 500 mg tablet    montelukast (SINGULAIR) 10 mg tablet    predniSONE 5 mg tablet     Allergies   Allergen Reactions    Bactrim [Sulfamethoxazole-Trimethoprim] Fever     Fever of 107       Vitals: Blood pressure 124/61, pulse 71, temperature 98.9 °F (37.2 °C), temperature source Oral, resp. rate 18, height 6' (1.829 m), weight 74.4 kg (164 lb 0.4 oz), SpO2 94%.,      Intake/Output Summary (Last 24 hours) at 4/29/2024 1551  Last data filed at 4/29/2024 1253  Gross per 24 hour   Intake 980 ml   Output 700 ml   Net 280 ml       Physical exam:        Head/eyes:    Normocephalic, without obvious abnormality, atraumatic,         PERRL, extraocular muscles intact, no scleral icterus    Nose:   Nares normal, septum midline, mucosa normal, no drainage    or sinus tenderness   Throat:   Moist mucous membranes, no thrush   Neck:   Supple, trachea midline, no adenopathy; no carotid    bruit or JVD   Lungs:   Diminished breath sounds.  No wheezing or rhonchi.        Heart:    Regular rate and rhythm, S1 and S2 normal, no murmur, rub   or gallop   Abdomen:     Soft, non-tender, bowel sounds active all four quadrants,     no masses, no organomegaly   Extremities:   Extremities normal, atraumatic, no cyanosis or edema   Skin:   Warm, dry, turgor normal, no rashes or lesions   Neurologic:   CNII-XII intact, normal strength, non-focal             Labs: CBC:   Lab Results   Component Value Date    WBC 9.39 04/29/2024    HGB 13.2 04/29/2024    HCT 39.6 04/29/2024    MCV 92 04/29/2024     04/29/2024    RBC 4.31 04/29/2024    MCH 30.6 04/29/2024    MCHC 33.3 04/29/2024    RDW 14.8 04/29/2024    MPV 9.1 04/29/2024   , CMP:   Lab Results   Component Value Date    SODIUM 133 (L) 04/29/2024    K 4.0 04/29/2024    CL 99 04/29/2024    CO2 26 04/29/2024    BUN 14 04/29/2024    CREATININE 1.04 04/29/2024    CALCIUM 9.2 04/29/2024    EGFR 68 04/29/2024       Imaging  and other studies: I have personally reviewed pertinent films in PACS chest x-rays reviewed on the PACS system.  It showed large right-sided pneumothorax.  Post chest tube placement pneumothorax has resolved.        Tressa Escobar MD

## 2024-04-29 NOTE — PROGRESS NOTES
[facility]  Progress Note  Name: Gold Van I  MRN: [id number]  Unit/Bed#: -01 I Date of Admission: 4/28/2024   Date of Service: 4/29/2024 I Hospital Day: 1    Assessment/Plan   * Secondary spontaneous pneumothorax  Assessment & Plan  Most recent chest x-ray from earlier today reveals pneumothorax resolution  Chest tube remains in place  Formal pulmonary consultation is pending  Defer additional management to pulmonary    Acute respiratory failure with hypoxia (HCC)  Assessment & Plan  Has improved -secondary to the pneumothorax as outlined above  Highest oxygen requirement on this admission was 5 L of supplemental oxygen via nasal cannula  Oxygen requirement is now down to 2 L of supplemental oxygen  Continue to wean as able    COPD (chronic obstructive pulmonary disease) ([facility])  Assessment & Plan  Patient has a history of panlobular emphysema  Currently without exacerbation  Continue Symbicort  Continue daily maintenance prednisone  Continue albuterol, and respiratory protocol    Type 2 diabetes mellitus with complication, without long-term current use of insulin ([facility])  Assessment & Plan  Lab Results   Component Value Date    HGBA1C 7.5 (H) 02/23/2024   Oral hypoglycemic medications remain on hold  Target blood sugar for the hospital is 140-180  Continue Accu-Cheks before meals and at bedtime with insulin coverage otherwise      Essential hypertension  Assessment & Plan  Blood pressure stable   continue amlodipine, lisinopril, atenolol    Pruritus  Assessment & Plan  Currently on chronic prednisone therapy 10 mg alternating with 15 mg every day.     Asthma  Assessment & Plan  Not in exacerbation.  Continue formulary Symbicort while inpatient can continue benztri on discharge.               VTE Pharmacologic Prophylaxis: VTE Score: 5 High Risk (Score >/= 5) - Pharmacological DVT Prophylaxis Ordered: enoxaparin (Lovenox). Sequential Compression Devices Ordered.    Mobility:   Basic  Mobility Inpatient Raw Score: 20  JH-HLM Goal: 6: Walk 10 steps or more  JH-HLM Achieved: 6: Walk 10 steps or more  JH-HLM Goal achieved. Continue to encourage appropriate mobility.    Patient Centered Rounds: I performed bedside rounds with nursing staff today.   Discussions with Specialists or Other Care Team Provider: Pulmonary, interventional radiology    Education and Discussions with Family / Patient: Updated  (wife) at bedside.    Total Time Spent on Date of Encounter in care of patient: 35 mins. This time was spent on one or more of the following: performing physical exam; counseling and coordination of care; obtaining or reviewing history; documenting in the medical record; reviewing/ordering tests, medications or procedures; communicating with other healthcare professionals and discussing with patient's family/caregivers.    Current Length of Stay: 1 day(s)  Current Patient Status: Inpatient   Certification Statement: The patient will continue to require additional inpatient hospital stay due to the need for pulmonary evaluation, and continued chest tube management  Discharge Plan: Anticipate discharge in 24-48 hrs to home.    Code Status: Level 1 - Full Code    Subjective:   Patient seen, sitting up in the chair, reports feeling okay at this time, has no new medical complaints.  He denies any chest pain, and/or shortness of breath    Objective:     Vitals:   Temp (24hrs), Av.3 °F (37.4 °C), Min:98.9 °F (37.2 °C), Max:99.8 °F (37.7 °C)    Temp:  [98.9 °F (37.2 °C)-99.8 °F (37.7 °C)] 99.8 °F (37.7 °C)  HR:  [80-98] 91  Resp:  [18-20] 18  BP: (123-149)/(67-80) 141/72  SpO2:  [91 %-96 %] 96 %  Body mass index is 22.25 kg/m².     Input and Output Summary (last 24 hours):     Intake/Output Summary (Last 24 hours) at 2024 1214  Last data filed at 2024 0900  Gross per 24 hour   Intake 860 ml   Output 700 ml   Net 160 ml       Physical Exam:   Physical Exam  Vitals and nursing note  reviewed.   Constitutional:       General: He is not in acute distress.     Appearance: Normal appearance. He is not ill-appearing.   HENT:      Head: Normocephalic and atraumatic.      Nose: Nose normal.   Eyes:      Extraocular Movements: Extraocular movements intact.      Pupils: Pupils are equal, round, and reactive to light.   Cardiovascular:      Rate and Rhythm: Normal rate and regular rhythm.      Pulses: Normal pulses.      Heart sounds: Normal heart sounds. No murmur heard.     No friction rub. No gallop.   Pulmonary:      Effort: Pulmonary effort is normal.      Breath sounds: Normal breath sounds.      Comments: Right-sided chest tube in place  Abdominal:      General: There is no distension.      Palpations: Abdomen is soft. There is no mass.      Tenderness: There is no abdominal tenderness. There is no guarding or rebound.   Musculoskeletal:         General: No swelling or tenderness. Normal range of motion.      Cervical back: Normal range of motion and neck supple. No rigidity. No muscular tenderness.      Right lower leg: No edema.      Left lower leg: No edema.   Skin:     General: Skin is warm.      Capillary Refill: Capillary refill takes less than 2 seconds.      Findings: No erythema or rash.   Neurological:      General: No focal deficit present.      Mental Status: He is alert and oriented to person, place, and time. Mental status is at baseline.   Psychiatric:         Mood and Affect: Mood normal.         Behavior: Behavior normal.          Additional Data:     Labs:  Results from last 7 days   Lab Units 04/29/24  0501 04/28/24  1018   WBC Thousand/uL 9.39 12.17*   HEMOGLOBIN g/dL 13.2 14.0   HEMATOCRIT % 39.6 42.1   PLATELETS Thousands/uL 232 245   SEGS PCT %  --  70   LYMPHO PCT %  --  18   MONO PCT %  --  9   EOS PCT %  --  1     Results from last 7 days   Lab Units 04/29/24  0501   SODIUM mmol/L 133*   POTASSIUM mmol/L 4.0   CHLORIDE mmol/L 99   CO2 mmol/L 26   BUN mg/dL 14   CREATININE  mg/dL 1.04   ANION GAP mmol/L 8   CALCIUM mg/dL 9.2   GLUCOSE RANDOM mg/dL 160*         Results from last 7 days   Lab Units 04/29/24  1048 04/29/24  0657 04/28/24 2029 04/28/24  1616   POC GLUCOSE mg/dl 209* 176* 180* 249*               Lines/Drains:  Invasive Devices       Peripheral Intravenous Line  Duration             Peripheral IV 04/28/24 Right Antecubital 1 day    Peripheral IV 04/28/24 Left Antecubital <1 day              Drain  Duration             Chest Tube Right <1 day                          Imaging: Reviewed radiology reports from this admission including: chest xray    Recent Cultures (last 7 days):         Last 24 Hours Medication List:   Current Facility-Administered Medications   Medication Dose Route Frequency Provider Last Rate    acetaminophen  650 mg Oral Q6H PRN Justine Daniel MD      albuterol  1 puff Inhalation Q6H PRN Justine Daniel MD      amLODIPine  5 mg Oral BID Justine Daniel MD      atenolol  25 mg Oral Daily Justine Daniel MD      budesonide-formoterol  2 puff Inhalation BID Justine Daniel MD      enoxaparin  40 mg Subcutaneous Daily Justine Daniel MD      famotidine  20 mg Oral Daily Justine Daniel MD      HYDROcodone-acetaminophen  1 tablet Oral Q6H PRN Brandon Sandhu PA-C      insulin lispro  1-6 Units Subcutaneous TID AC Justine Daniel MD      lisinopril  20 mg Oral BID Justine Daniel MD      LORazepam  0.5 mg Oral HS PRN Justine Daniel MD      montelukast  10 mg Oral Daily Justine Daniel MD      morphine injection  2 mg Intravenous Q4H PRN Brandon Sandhu PA-C      ondansetron  4 mg Intravenous Q6H PRN Justine Daniel MD      predniSONE  10 mg Oral Daily Justine Daniel MD      sodium chloride (PF)  3 mL Intravenous Q1H PRN Bryan Diehl PA-C          Today, Patient Was Seen By: Ronnell House MD    **Please Note: This note may have been constructed using a voice recognition  system.**

## 2024-04-29 NOTE — PLAN OF CARE
Problem: OCCUPATIONAL THERAPY ADULT  Goal: Performs self-care activities at highest level of function for planned discharge setting.  See evaluation for individualized goals.  Description: Treatment Interventions: ADL retraining, Functional transfer training, Endurance training, UE strengthening/ROM, Patient/family training, Equipment evaluation/education, Compensatory technique education, Energy conservation, Activityengagement          See flowsheet documentation for full assessment, interventions and recommendations.   Note: Limitation: Decreased ADL status, Decreased self-care trans, Decreased high-level ADLs, Decreased endurance  Prognosis: Good  Assessment: Patient is a 78 y.o. male seen for OT evaluation s/p admit to  [facility]24 w/Secondary spontaneous pneumothorax + commorbidities/PMHx (as listed in medical record) affecting patient's functional performance c ADL tasks at time of assessment. OT orders placed for evaluation and treatment to assess pt's ADLs, cognitive status + performance during functional tasks in order to formulate appropriate d/c recommendations.     Therapist performed at least two patient identifiers during session including name and wristband. Personal factors affecting patient at time of initial evaluation include: step(s) to enter environment, inability to perform ADLs, inability to ambulate household distances, and decreased initiation and engagement.   Pt's clinical presentation is currently unstable/unpredictable given new onset deficits that effect pt's occupational performance and ability to safely return to [facility] including decrease activity tolerance, decrease standing balance, decrease performance during ADL tasks, decrease generalized strength, decrease activity engagement, and decrease performance during functional transfers combined with medical complications of pain impacting overall mobility status, abnormal renal lab values, abnormal blood sugars,  abnormal sodium values, low SpO2 values, new onset O2 use, and need for input for mobility technique/safety. This evaluation required an extensive review of medical and/or therapy records and additional review of physical, cognitive and psychosocial history related to functional performance. Based upon functional performance deficits and assessments, this evaluation has been identified as a  high complexity evaluation.      Patient to benefit from continued Occupational Therapy treatment while in the hospital to address aforementioned deficits and maximize level of functional independence with ADLs and functional mobility. Pt currently demonstrates WFL ability to collaboratively engage in d/c planning.     Rehab Resource Intensity Level, OT: II (Moderate Resource Intensity)

## 2024-04-30 ENCOUNTER — APPOINTMENT (INPATIENT)
Dept: RADIOLOGY | Facility: HOSPITAL | Age: 79
DRG: 199 | End: 2024-04-30
Payer: COMMERCIAL

## 2024-04-30 VITALS
BODY MASS INDEX: 22.22 KG/M2 | OXYGEN SATURATION: 91 % | HEART RATE: 101 BPM | TEMPERATURE: 98.9 F | WEIGHT: 164.02 LBS | SYSTOLIC BLOOD PRESSURE: 116 MMHG | DIASTOLIC BLOOD PRESSURE: 78 MMHG | HEIGHT: 72 IN | RESPIRATION RATE: 16 BRPM

## 2024-04-30 LAB
ANION GAP SERPL CALCULATED.3IONS-SCNC: 9 MMOL/L (ref 4–13)
BASOPHILS # BLD AUTO: 0.03 THOUSANDS/ÂΜL (ref 0–0.1)
BASOPHILS NFR BLD AUTO: 0 % (ref 0–1)
BUN SERPL-MCNC: 17 MG/DL (ref 5–25)
CALCIUM SERPL-MCNC: 8.9 MG/DL (ref 8.4–10.2)
CHLORIDE SERPL-SCNC: 99 MMOL/L (ref 96–108)
CO2 SERPL-SCNC: 26 MMOL/L (ref 21–32)
CREAT SERPL-MCNC: 0.98 MG/DL (ref 0.6–1.3)
EOSINOPHIL # BLD AUTO: 0.15 THOUSAND/ÂΜL (ref 0–0.61)
EOSINOPHIL NFR BLD AUTO: 2 % (ref 0–6)
ERYTHROCYTE [DISTWIDTH] IN BLOOD BY AUTOMATED COUNT: 14.3 % (ref 11.6–15.1)
GFR SERPL CREATININE-BSD FRML MDRD: 73 ML/MIN/1.73SQ M
GLUCOSE SERPL-MCNC: 192 MG/DL (ref 65–140)
GLUCOSE SERPL-MCNC: 263 MG/DL (ref 65–140)
GLUCOSE SERPL-MCNC: 331 MG/DL (ref 65–140)
HCT VFR BLD AUTO: 41.1 % (ref 36.5–49.3)
HGB BLD-MCNC: 13.7 G/DL (ref 12–17)
IMM GRANULOCYTES # BLD AUTO: 0.07 THOUSAND/UL (ref 0–0.2)
IMM GRANULOCYTES NFR BLD AUTO: 1 % (ref 0–2)
LYMPHOCYTES # BLD AUTO: 1.48 THOUSANDS/ÂΜL (ref 0.6–4.47)
LYMPHOCYTES NFR BLD AUTO: 17 % (ref 14–44)
MCH RBC QN AUTO: 30.1 PG (ref 26.8–34.3)
MCHC RBC AUTO-ENTMCNC: 33.3 G/DL (ref 31.4–37.4)
MCV RBC AUTO: 90 FL (ref 82–98)
MONOCYTES # BLD AUTO: 0.64 THOUSAND/ÂΜL (ref 0.17–1.22)
MONOCYTES NFR BLD AUTO: 8 % (ref 4–12)
NEUTROPHILS # BLD AUTO: 6.22 THOUSANDS/ÂΜL (ref 1.85–7.62)
NEUTS SEG NFR BLD AUTO: 72 % (ref 43–75)
NRBC BLD AUTO-RTO: 0 /100 WBCS
PLATELET # BLD AUTO: 235 THOUSANDS/UL (ref 149–390)
PMV BLD AUTO: 9.7 FL (ref 8.9–12.7)
POTASSIUM SERPL-SCNC: 3.7 MMOL/L (ref 3.5–5.3)
RBC # BLD AUTO: 4.55 MILLION/UL (ref 3.88–5.62)
SODIUM SERPL-SCNC: 134 MMOL/L (ref 135–147)
WBC # BLD AUTO: 8.59 THOUSAND/UL (ref 4.31–10.16)

## 2024-04-30 PROCEDURE — 85025 COMPLETE CBC W/AUTO DIFF WBC: CPT | Performed by: HOSPITALIST

## 2024-04-30 PROCEDURE — 99239 HOSP IP/OBS DSCHRG MGMT >30: CPT | Performed by: INTERNAL MEDICINE

## 2024-04-30 PROCEDURE — 71046 X-RAY EXAM CHEST 2 VIEWS: CPT

## 2024-04-30 PROCEDURE — 80048 BASIC METABOLIC PNL TOTAL CA: CPT | Performed by: HOSPITALIST

## 2024-04-30 PROCEDURE — 99232 SBSQ HOSP IP/OBS MODERATE 35: CPT | Performed by: INTERNAL MEDICINE

## 2024-04-30 PROCEDURE — 82948 REAGENT STRIP/BLOOD GLUCOSE: CPT

## 2024-04-30 RX ORDER — BENZONATATE 100 MG/1
100 CAPSULE ORAL 3 TIMES DAILY PRN
Qty: 20 CAPSULE | Refills: 0 | Status: SHIPPED | OUTPATIENT
Start: 2024-04-30

## 2024-04-30 RX ORDER — BENZONATATE 100 MG/1
100 CAPSULE ORAL 3 TIMES DAILY PRN
Status: DISCONTINUED | OUTPATIENT
Start: 2024-04-30 | End: 2024-04-30 | Stop reason: HOSPADM

## 2024-04-30 RX ORDER — HYDROCODONE POLISTIREX AND CHLORPHENIRAMINE POLISTIREX 10; 8 MG/5ML; MG/5ML
5 SUSPENSION, EXTENDED RELEASE ORAL EVERY 12 HOURS PRN
Status: DISCONTINUED | OUTPATIENT
Start: 2024-04-30 | End: 2024-04-30

## 2024-04-30 RX ORDER — HYDROCODONE POLISTIREX AND CHLORPHENIRAMINE POLISTIREX 10; 8 MG/5ML; MG/5ML
5 SUSPENSION, EXTENDED RELEASE ORAL EVERY 12 HOURS SCHEDULED
Status: DISCONTINUED | OUTPATIENT
Start: 2024-04-30 | End: 2024-04-30 | Stop reason: HOSPADM

## 2024-04-30 RX ADMIN — ATENOLOL 25 MG: 25 TABLET ORAL at 08:44

## 2024-04-30 RX ADMIN — INSULIN LISPRO 3 UNITS: 100 INJECTION, SOLUTION INTRAVENOUS; SUBCUTANEOUS at 07:57

## 2024-04-30 RX ADMIN — BUDESONIDE AND FORMOTEROL FUMARATE DIHYDRATE 2 PUFF: 160; 4.5 AEROSOL RESPIRATORY (INHALATION) at 08:44

## 2024-04-30 RX ADMIN — HYDROCODONE POLISTIREX AND CHLORPHENIRAMINE POLISTIREX 5 ML: 10; 8 SUSPENSION, EXTENDED RELEASE ORAL at 10:53

## 2024-04-30 RX ADMIN — LISINOPRIL 20 MG: 20 TABLET ORAL at 08:44

## 2024-04-30 RX ADMIN — HYDROCODONE POLISTIREX AND CHLORPHENIRAMINE POLISTIREX 5 ML: 10; 8 SUSPENSION, EXTENDED RELEASE ORAL at 04:41

## 2024-04-30 RX ADMIN — FAMOTIDINE 20 MG: 20 TABLET, FILM COATED ORAL at 08:44

## 2024-04-30 RX ADMIN — PREDNISONE 10 MG: 10 TABLET ORAL at 08:44

## 2024-04-30 RX ADMIN — AMLODIPINE BESYLATE 5 MG: 5 TABLET ORAL at 08:44

## 2024-04-30 RX ADMIN — MONTELUKAST 10 MG: 10 TABLET, FILM COATED ORAL at 08:44

## 2024-04-30 RX ADMIN — INSULIN LISPRO 4 UNITS: 100 INJECTION, SOLUTION INTRAVENOUS; SUBCUTANEOUS at 11:47

## 2024-04-30 RX ADMIN — ENOXAPARIN SODIUM 40 MG: 40 INJECTION SUBCUTANEOUS at 08:45

## 2024-04-30 NOTE — UTILIZATION REVIEW
NOTIFICATION OF INPATIENT ADMISSION   AUTHORIZATION REQUEST   SERVICING FACILITY:   [place] [address]  Tax ID: [id number]  NPI: [id number]   ATTENDING PROVIDER:  Attending Name and NPI#: Cora Sawant Md [[id number]]  Address: [address] [place] [address]  Phone: [phone]     ADMISSION INFORMATION:  Place of Service: Inpatient [facility]  Place of Service Code: 21  Inpatient Admission Date/Time: 4/28/24  3:42 PM  Discharge Date/Time: No discharge date for patient encounter.  Admitting Diagnosis Code/Description:  Chest pain [R07.9]  Bronchitis [J40]  Secondary spontaneous pneumothorax [J93.12]  Pneumothorax [J93.9]     UTILIZATION REVIEW CONTACT:  Sarabjit Lucia, Utilization   Network Utilization Review Department  Phone: [phone]  Fax [phone]  Email: [email or web address]  Contact for approvals/pending authorizations, clinical reviews, and discharge.     PHYSICIAN ADVISORY SERVICES:  Medical Necessity Denial & Bzct-nk-Wjba Review  Phone: [phone]  Fax: [phone]  Email: [email or web address]     DISCHARGE SUPPORT TEAM:  For Patients Discharge Needs & Updates  Phone: [phone] opt. 2 Fax: [phone]  Email: [email or web address]

## 2024-04-30 NOTE — PROGRESS NOTES
Progress Note - Pulmonary   Gold Van 78 y.o. male MRN: [id number]  Unit/Bed#: -01 Encounter: [id number]      Assessment:  Right-sided pneumothorax.  Status post chest tube placement.  Resolved.  Chronic obstructive pulmonary disease.  History of tobacco use.    Plan:  Discontinue chest tube.  Resume Breztri after discharge.  Albuterol rescue inhaler 2 inhalations 4 times a day as needed.  Follow-up as outpatient.    Discussed with Dr. Sawant.    Subjective:   The patient is feeling better.  Denies shortness of breath.  Denies pain.  Denies cough or sputum production.    Vitals: Blood pressure 116/78, pulse 101, temperature 98.9 °F (37.2 °C), resp. rate 16, height 6' (1.829 m), weight 74.4 kg (164 lb 0.4 oz), SpO2 91%., Body mass index is 22.25 kg/m².      Intake/Output Summary (Last 24 hours) at 4/30/2024 1627  Last data filed at 4/30/2024 0724  Gross per 24 hour   Intake 660 ml   Output 29 ml   Net 631 ml       Physical Exam  Gen: Awake, alert, oriented x 3, no acute distress  HEENT: Mucous membranes moist, no oral lesions, no thrush  NECK: No accessory muscle use, JVP not elevated  Cardiac: Regular, single S1, single S2, no murmurs, no rubs, no gallops  Lungs: Decreased breath sounds.  No wheezing or rhonchi.  Abdomen: normoactive bowel sounds, soft nontender, nondistended, no rebound or rigidity, no guarding  Extremities: no cyanosis, no clubbing, no edema    Labs: CBC:   Lab Results   Component Value Date    WBC 8.59 04/30/2024    HGB 13.7 04/30/2024    HCT 41.1 04/30/2024    MCV 90 04/30/2024     04/30/2024    RBC 4.55 04/30/2024    MCH 30.1 04/30/2024    MCHC 33.3 04/30/2024    RDW 14.3 04/30/2024    MPV 9.7 04/30/2024    NRBC 0 04/30/2024   , CMP:   Lab Results   Component Value Date    SODIUM 134 (L) 04/30/2024    K 3.7 04/30/2024    CL 99 04/30/2024    CO2 26 04/30/2024    BUN 17 04/30/2024    CREATININE 0.98 04/30/2024    CALCIUM 8.9 04/30/2024    EGFR 73 04/30/2024     Chest x-ray which  was done this morning while the chest tube on waterseal is reviewed.  No recurrence of pneumothorax.      Tressa Escobar MD

## 2024-04-30 NOTE — DISCHARGE SUMMARY
[facility]  Discharge- Gold Van 1945, 78 y.o. male MRN: [id number]  Unit/Bed#: -01 Encounter: [id number]  Primary Care Provider: Shayne Diaz MD   Date and time admitted to hospital: 4/28/2024 10:09 AM    * Secondary spontaneous pneumothorax  Assessment & Plan  Patient had chest tube placed  Chest x-ray per pulmonology shows improved pneumothorax  Chest tube removed on 4/30/2024 by pulmonology  Cleared for discharge with outpatient follow-up  Tessalon Perles prescribed for cough  Advised to return with any worsening symptoms    COPD (chronic obstructive pulmonary disease) (HCC)  Assessment & Plan  Patient has a history of panlobular emphysema  Currently without exacerbation  Continue Symbicort  Continue daily maintenance prednisone  Continue albuterol, and respiratory protocol    Acute respiratory failure with hypoxia (HCC)  Assessment & Plan  Has improved -secondary to the pneumothorax as outlined above  Highest oxygen requirement on this admission was 5 L of supplemental oxygen via nasal cannula  Patient now off oxygen  Chest tube has been removed.  Follow-up with pulmonology as outpatient    Pruritus  Assessment & Plan  Currently on chronic prednisone therapy 10 mg alternating with 15 mg every day.     Asthma  Assessment & Plan  Not in exacerbation.  Continue formulary Symbicort while inpatient can continue benztri on discharge.    Essential hypertension  Assessment & Plan  Blood pressure stable   continue amlodipine, lisinopril, atenolol    Type 2 diabetes mellitus with complication, without long-term current use of insulin ([facility])  Assessment & Plan  Lab Results   Component Value Date    HGBA1C 7.5 (H) 02/23/2024   Continue home diabetic regimen      Medical Problems       Resolved Problems  Date Reviewed: 11/2/2021   None       Discharging Physician / Practitioner: Cora Sawant MD  PCP: Shayne Diaz MD  Admission Date:   Admission Orders (From  admission, onward)       Ordered        04/28/24 1542  INPATIENT ADMISSION  Once                          Discharge Date: 04/30/24    Consultations During Hospital Stay:  Pulmonology    Procedures Performed:   Chest tube placed in the ER.  Removed on 4/30/2024 by pulmonology    Significant Findings / Test Results:   Pneumothorax    Incidental Findings:   None    Test Results Pending at Discharge (will require follow up):   None     Outpatient Tests Requested:  Routine labs with PCP as outpatient    Complications: None    Reason for Admission: Pneumothorax    Hospital Course:   Gold Van is a 78 y.o. male patient who originally presented to the hospital on 4/28/2024 due to shortness of breath/cough.  Patient found to have right-sided pneumothorax.  Chest tube was placed by ER provider.  Patient was admitted for further evaluation/treatment.  Pulmonology consulted.  She was initially requiring up to 5 L nasal cannula.  Currently improved as he is now on room air.  Chest x-ray shows improvement of pneumothorax.  Pulmonology removed chest tube today.  Patient stable for discharge home with outpatient follow-up.  Advised to return with any worsening symptoms.  Prescribed Tessalon Perles for cough on discharge.    Please see above list of diagnoses and related plan for additional information.     Condition at Discharge: stable    Discharge Day Visit / Exam:   Subjective: Patient states he is feeling better today.  No fever or chills  Vitals: Blood Pressure: 116/78 (04/30/24 0724)  Pulse: 101 (04/30/24 0724)  Temperature: 98.9 °F (37.2 °C) (04/30/24 0724)  Temp Source: Oral (04/29/24 2250)  Respirations: 16 (04/30/24 0724)  Height: 6' (182.9 cm) (04/28/24 1612)  Weight - Scale: 74.4 kg (164 lb 0.4 oz) (04/28/24 1612)  SpO2: 91 % (04/30/24 0845)  Exam:   Physical Exam  Constitutional:       General: He is not in acute distress.  HENT:      Head: Normocephalic and atraumatic.      Nose: Nose normal.      Mouth/Throat:       Mouth: Mucous membranes are moist.   Eyes:      Extraocular Movements: Extraocular movements intact.      Conjunctiva/sclera: Conjunctivae normal.   Cardiovascular:      Rate and Rhythm: Normal rate and regular rhythm.   Pulmonary:      Effort: Pulmonary effort is normal. No respiratory distress.   Abdominal:      Palpations: Abdomen is soft.      Tenderness: There is no abdominal tenderness.   Musculoskeletal:         General: Normal range of motion.      Cervical back: Normal range of motion and neck supple.   Skin:     General: Skin is warm and dry.   Neurological:      General: No focal deficit present.      Mental Status: He is alert. Mental status is at baseline.      Cranial Nerves: No cranial nerve deficit.   Psychiatric:         Mood and Affect: Mood normal.         Behavior: Behavior normal.          Discussion with Family:  Updated patient regarding discharge plan.     Discharge instructions/Information to patient and family:   See after visit summary for information provided to patient and family.      Provisions for Follow-Up Care:  See after visit summary for information related to follow-up care and any pertinent home health orders.      Mobility at time of Discharge:   Basic Mobility Inpatient Raw Score: 18  JH-HLM Goal: 6: Walk 10 steps or more  JH-HLM Achieved: 6: Walk 10 steps or more  HLM Goal achieved. Continue to encourage appropriate mobility.     Disposition:   Home    Planned Readmission: no     Discharge Statement:  I spent 35 minutes discharging the patient. This time was spent on the day of discharge. I had direct contact with the patient on the day of discharge. Greater than 50% of the total time was spent examining patient, answering all patient questions, arranging and discussing plan of care with patient as well as directly providing post-discharge instructions.  Additional time then spent on discharge activities.    Discharge Medications:  See after visit summary for reconciled  discharge medications provided to patient and/or family.      **Please Note: This note may have been constructed using a voice recognition system**

## 2024-04-30 NOTE — ASSESSMENT & PLAN NOTE
Has improved -secondary to the pneumothorax as outlined above  Highest oxygen requirement on this admission was 5 L of supplemental oxygen via nasal cannula  Patient now off oxygen  Chest tube has been removed.  Follow-up with pulmonology as outpatient

## 2024-04-30 NOTE — ASSESSMENT & PLAN NOTE
Patient had chest tube placed  Chest x-ray per pulmonology shows improved pneumothorax  Chest tube removed on 4/30/2024 by pulmonology  Cleared for discharge with outpatient follow-up  Tessalon Perles prescribed for cough  Advised to return with any worsening symptoms

## 2024-04-30 NOTE — UTILIZATION REVIEW
Initial Clinical Review    Admission: Date/Time/Statement:   Admission Orders (From admission, onward)       Ordered        04/28/24 1542  INPATIENT ADMISSION  Once                          Orders Placed This Encounter   Procedures    INPATIENT ADMISSION     Standing Status:   Standing     Number of Occurrences:   1     Order Specific Question:   Level of Care     Answer:   Med Surg [16]     Order Specific Question:   Estimated length of stay     Answer:   More than 2 Midnights     Order Specific Question:   Certification     Answer:   I certify that inpatient services are medically necessary for this patient for a duration of greater than two midnights. See H&P and MD Progress Notes for additional information about the patient's course of treatment.     ED Arrival Information       Expected   -    Arrival   4/28/2024 10:06    Acuity   Emergent              Means of arrival   Wheelchair    Escorted by   Spouse    Service   Hospitalist    Admission type   Emergency              Arrival complaint   sob chest and back pain             Chief Complaint   Patient presents with    Chest Pain     Pt has COPD and chest pain when breathing. SOB        Initial Presentation: 78 y.o. male to the ED from home with complaints of chest pain. H/O COPD emphysematous type, hypertension, hyperlipidemia, diabetes . Admitted to inpatient for spontaneous pneumothorax, acute respiratory failure with hypoxia. ON arrival, he has chest discomfort, cough, shortness of breath.  Lungs with decreased breath sounds. Tachycardic, hypertensive. IN the ED, started on nebulizers, given IV zofran, iv dilaudid. CXR shows: Large right pneumothorax. Chest tube placed in right side. Placed on 5LNC.      Anticipated Length of Stay/Certification Statement:  Large right pneumothorax     Date: 4/29   Day 2:   Chest tube remains in place. PUlmonary consult. Weaned to 2 LNC.      PUlmonary consult:  Cehst tube to water seal.  Repeat CXR.  LUngs dminished.      Date: 4/30  Day 3: Has surpassed a 2nd midnight with active treatments and services. INitially admitted for spontaneous pneumothorax and had chest tube placed.  Chest tube removed today.  Repeat CXR shows improved pneumothorax.  Required 5 LNC, has been weaned off. LUngs clear.       ED Triage Vitals   Temperature Pulse Respirations Blood Pressure SpO2   04/28/24 1013 04/28/24 1013 04/28/24 1013 04/28/24 1013 04/28/24 1013   (!) 96.7 °F (35.9 °C) (!) 113 20 (!) 163/104 92 %      Temp Source Heart Rate Source Patient Position - Orthostatic VS BP Location FiO2 (%)   04/28/24 2244 04/28/24 1013 04/28/24 1013 04/28/24 1013 --   Oral Monitor Sitting Left arm       Pain Score       04/28/24 1013       4          Wt Readings from Last 1 Encounters:   04/28/24 74.4 kg (164 lb 0.4 oz)     Additional Vital Signs: Vital Signs (last 2 days)    Date/Time Temp Pulse Resp BP MAP (mmHg) SpO2 Calculated FIO2 (%) - Nasal Cannula Nasal Cannula O2 Flow Rate (L/min) O2 Device Patient Position - Orthostatic VS   04/30/24 0845 -- -- -- -- -- 91 % -- -- None (Room air) --   04/30/24 07:24:53 98.9 °F (37.2 °C) 101 16 116/78 91 94 % -- -- -- --   04/29/24 2252 -- -- 16 -- -- -- -- -- -- --   04/29/24 22:50:38 98.8 °F (37.1 °C) 81 16 133/67 89 92 % -- -- -- Lying   04/29/24 21:40:45 -- 83 -- 123/61 82 93 % -- -- -- --   04/29/24 14:48:56 98.9 °F (37.2 °C) 71 18 124/61 82 94 % -- -- None (Room air) Lying   04/29/24 10:21:34 -- 91 -- 141/72 95 96 % -- -- -- --   04/29/24 1019 -- -- -- 141/72 -- -- -- -- -- --   04/29/24 0701 99.8 °F (37.7 °C) 98 18 149/74 99 95 % 28 2 L/min Nasal cannula Lying   04/28/24 22:44:40 99.2 °F (37.3 °C) 83 19 123/70 88 96 % 28 2 L/min Nasal cannula Lying   04/28/24 1722 -- -- -- -- -- 91 % -- -- None (Room air) --   04/28/24 16:12:13 98.9 °F (37.2 °C) 80 18 132/67 89 94 % -- -- -- --   04/28/24 1438 -- 94 20 125/80 99 94 % 40 5 L/min Nasal cannula --   04/28/24 1118 -- 106 Abnormal  20 136/103 Abnormal  -- 95 %  -- -- None (Room air) --   04/28/24 1117 -- -- -- -- -- -- -- -- None (Room air) --   04/28/24 1045 -- 101 16 -- -- 90 % -- -- -- --   04/28/24 1030 -- 109 Abnormal  13 -- -- 92 % -- -- -- --   04/28/24 1015 -- 117 Abnormal  13 163/104 Abnormal  128 92 % -- -- -- --   04/28/24 1013 96.7 °F (35.9 °C) Abnormal  113 Abnormal  20 163/104 Abnormal  128 92 % -- -- None (Room air) Sitting     Pertinent Labs/Diagnostic Test Results:   4/28 EKG:   Narrative & Impression    Sinus tachycardia with Premature atrial complexes  Nonspecific ST and T wave abnormality  Abnormal ECG  When compared with ECG of 24-MAR-2021 02:34,  No significant change was found         Results from last 7 days   Lab Units 04/30/24  0439 04/29/24  0501 04/28/24  1018   WBC Thousand/uL 8.59 9.39 12.17*   HEMOGLOBIN g/dL 13.7 13.2 14.0   HEMATOCRIT % 41.1 39.6 42.1   PLATELETS Thousands/uL 235 232 245   TOTAL NEUT ABS Thousands/µL 6.22  --  8.67*         Results from last 7 days   Lab Units 04/30/24  0439 04/29/24  0501 04/28/24  1018   SODIUM mmol/L 134* 133* 134*   POTASSIUM mmol/L 3.7 4.0 3.8   CHLORIDE mmol/L 99 99 98   CO2 mmol/L 26 26 26   ANION GAP mmol/L 9 8 10   BUN mg/dL 17 14 18   CREATININE mg/dL 0.98 1.04 1.11   EGFR ml/min/1.73sq m 73 68 63   CALCIUM mg/dL 8.9 9.2 9.6         Results from last 7 days   Lab Units 04/30/24  1100 04/30/24  0723 04/29/24  2115 04/29/24  1536 04/29/24  1048 04/29/24  0657 04/28/24 2029 04/28/24  1616   POC GLUCOSE mg/dl 331* 263* 315* 303* 209* 176* 180* 249*     Results from last 7 days   Lab Units 04/30/24  0439 04/29/24  0501 04/28/24  1018   GLUCOSE RANDOM mg/dL 192* 160* 274*         Results from last 7 days   Lab Units 04/28/24  1018   HS TNI 0HR ng/L 7       ED Treatment:   Medication Administration from 04/28/2024 1006 to 04/28/2024 1605         Date/Time Order Dose Route Action Comments     04/28/2024 1123 EDT sodium chloride 0.9 % bolus 500 mL 500 mL Intravenous New Bag --     04/28/2024 1434 EDT  HYDROmorphone (DILAUDID) injection 0.5 mg 0.5 mg Intravenous Given --     04/28/2024 1514 EDT HYDROmorphone (DILAUDID) injection 1 mg 1 mg Intravenous Given --          Past Medical History:   Diagnosis Date    Asthma     Diabetes mellitus (HCC)     Environmental allergies     Hyperlipidemia     Hypertension     Macular degeneration 07/13/2020    right eye    Orthostatic hypotension     Osteopenia     Sinusitis          Admitting Diagnosis: Chest pain [R07.9]  Bronchitis [J40]  Secondary spontaneous pneumothorax [J93.12]  Pneumothorax [J93.9]  Age/Sex: 78 y.o. male  Admission Orders:  Scheduled Medications:  amLODIPine, 5 mg, Oral, BID  atenolol, 25 mg, Oral, Daily  budesonide-formoterol, 2 puff, Inhalation, BID  enoxaparin, 40 mg, Subcutaneous, Daily  famotidine, 20 mg, Oral, Daily  Hydrocod Manuel-Chlorphe Manuel ER, 5 mL, Oral, Q12H SANDRA  insulin lispro, 1-5 Units, Subcutaneous, HS  insulin lispro, 1-6 Units, Subcutaneous, TID AC  lisinopril, 20 mg, Oral, BID  montelukast, 10 mg, Oral, Daily  predniSONE, 10 mg, Oral, Daily      Continuous IV Infusions:     PRN Meds:  acetaminophen, 650 mg, Oral, Q6H PRN  albuterol, 1 puff, Inhalation, Q6H PRN  benzonatate, 100 mg, Oral, TID PRN  HYDROcodone-acetaminophen, 1 tablet, Oral, Q6H PRN  LORazepam, 0.5 mg, Oral, HS PRN  morphine injection, 2 mg, Intravenous, Q4H PRN  ondansetron, 4 mg, Intravenous, Q6H PRN  sodium chloride (PF), 3 mL, Intravenous, Q1H PRN        IP CONSULT TO PULMONOLOGY    Network Utilization Review Department  ATTENTION: Please call with any questions or concerns to [phone] and carefully listen to the prompts so that you are directed to the right person. All voicemails are confidential.   For Discharge needs, contact Care Management DC Support Team at [phone] opt. [phone]  Send all requests for admission clinical reviews, approved or denied determinations and any other requests to dedicated fax number below belonging to the campus where the patient is  receiving treatment. List of dedicated fax numbers for the Facilities:  FACILITY NAME UR FAX NUMBER   ADMISSION DENIALS (Administrative/Medical Necessity) [phone]   DISCHARGE SUPPORT TEAM (NETWORK) [phone]   PARENT CHILD HEALTH (Maternity/NICU/Pediatrics) [phone]   [facility] [phone]   [facility] [phone]   [facility] [phone]   [facility] [phone]   [facility] [phone]   [facility] [phone]   [facility] [phone]   [facility] [phone]   [facility] [phone]   [facility] [phone]   [facility] [phone]   [facility] [phone]

## 2024-04-30 NOTE — NURSING NOTE
Patient discharged to home, no needs. Reviewed discharge instructions with patient and spouse - all questions/concerns addressed prior to d/c.

## 2024-05-06 ENCOUNTER — APPOINTMENT (OUTPATIENT)
Dept: RADIOLOGY | Facility: CLINIC | Age: 79
End: 2024-05-06
Payer: COMMERCIAL

## 2024-05-06 DIAGNOSIS — J93.9 PNEUMOTHORAX ON RIGHT: ICD-10-CM

## 2024-05-06 PROCEDURE — 71046 X-RAY EXAM CHEST 2 VIEWS: CPT

## 2024-05-08 ENCOUNTER — OFFICE VISIT (OUTPATIENT)
Dept: PULMONOLOGY | Facility: CLINIC | Age: 79
End: 2024-05-08
Payer: COMMERCIAL

## 2024-05-08 VITALS
HEIGHT: 72 IN | HEART RATE: 78 BPM | DIASTOLIC BLOOD PRESSURE: 76 MMHG | WEIGHT: 167 LBS | BODY MASS INDEX: 22.62 KG/M2 | TEMPERATURE: 98.2 F | SYSTOLIC BLOOD PRESSURE: 132 MMHG | RESPIRATION RATE: 20 BRPM | OXYGEN SATURATION: 95 %

## 2024-05-08 DIAGNOSIS — R05.9 COUGH, UNSPECIFIED TYPE: ICD-10-CM

## 2024-05-08 DIAGNOSIS — R06.02 SHORTNESS OF BREATH: ICD-10-CM

## 2024-05-08 DIAGNOSIS — J43.1 PANLOBULAR EMPHYSEMA (HCC): ICD-10-CM

## 2024-05-08 DIAGNOSIS — J93.12 SECONDARY SPONTANEOUS PNEUMOTHORAX: Primary | ICD-10-CM

## 2024-05-08 DIAGNOSIS — J45.909 UNCOMPLICATED ASTHMA, UNSPECIFIED ASTHMA SEVERITY, UNSPECIFIED WHETHER PERSISTENT: Chronic | ICD-10-CM

## 2024-05-08 PROBLEM — J96.01 ACUTE RESPIRATORY FAILURE WITH HYPOXIA (HCC): Status: RESOLVED | Noted: 2024-04-28 | Resolved: 2024-05-08

## 2024-05-08 LAB
DME PARACHUTE DELIVERY DATE REQUESTED: NORMAL
DME PARACHUTE ITEM DESCRIPTION: NORMAL
DME PARACHUTE ORDER STATUS: NORMAL
DME PARACHUTE SUPPLIER NAME: NORMAL
DME PARACHUTE SUPPLIER PHONE: NORMAL

## 2024-05-08 PROCEDURE — 99214 OFFICE O/P EST MOD 30 MIN: CPT

## 2024-05-08 NOTE — ASSESSMENT & PLAN NOTE
-At least 30-pack-year smoking history, quit at age 50  -Moderate panlobular emphysema on recent CT imaging  -No prior PFTs on file, however has been treated for years for COPD/asthma  -Patient with persistent cough, mild shortness of breath, occasional wheezing.  Using albuterol 2-3 times per day in addition to Breztri    Plan:  -Complete PFTs with postbronchodilator to evaluate lung function and degree of asthma/COPD  -Trial Trelegy 100 1 puff daily given reported history of asthma.  -Samples of Trelegy provided.  He will let us know if he prefers Trelegy versus Breztri  -Continue albuterol HFA/nebs every 6 hours as needed  Continue montelukast 10 mg nightly  -Follow-up in 3 months or sooner if needed

## 2024-05-08 NOTE — PROGRESS NOTES
Pulmonary Follow Up Note  Gold Van 78 y.o. male MRN: [id number]  5/8/2024    Assessment/Plan:    Secondary spontaneous pneumothorax  -S/p hospitalization for spontaneous right-sided pneumothorax after increased cough x 1 week.  Resolved with chest tube.  Recent chest x-ray 2 days ago showed no recurrence, clear lungs  -Patient still with daily cough, mild shortness of breath, improved from prior to hospitalization, but not back to his baseline  -SpO2 95% on room air today, lungs clear on exam    Plan:  -Continue to monitor.  Patient advised if he develops any new sudden increase shortness of breath or cough to proceed to ED for further evaluation of recurrence of pneumothorax    Cough  -Began prior to his hospitalization, improving, however, persistent  -Likely contributed from his history of asthma, allergies, postnasal drainage, recent pneumothorax with reexpansion, and COPD  -Encouraged patient to continue nasal rinses, add Flonase nasal spray for postnasal drainage.  Continue montelukast 10 mg nightly and OTC antihistamine   -May continue Tessalon Perles 3 times a day as needed    COPD (chronic obstructive pulmonary disease) (HCC)  -At least 30-pack-year smoking history, quit at age 50  -Moderate panlobular emphysema on recent CT imaging  -No prior PFTs on file, however has been treated for years for COPD/asthma  -Patient with persistent cough, mild shortness of breath, occasional wheezing.  Using albuterol 2-3 times per day in addition to Breztri    Plan:  -Complete PFTs with postbronchodilator to evaluate lung function and degree of asthma/COPD  -Trial Trelegy 100 1 puff daily given reported history of asthma.  -Samples of Trelegy provided.  He will let us know if he prefers Trelegy versus Breztri  -Continue albuterol HFA/nebs every 6 hours as needed  Continue montelukast 10 mg nightly  -Follow-up in 3 months or sooner if needed      Diagnoses and all orders for this visit:    Secondary spontaneous  pneumothorax    Panlobular emphysema (HCC)  -     Complete PFT with post Bronchodilator and Six Minute walk; Future    Shortness of breath  -     Complete PFT with post Bronchodilator and Six Minute walk; Future    Uncomplicated asthma, unspecified asthma severity, unspecified whether persistent    Cough, unspecified type    Other orders  -     Overnight Oximetry Test      Return in about 3 months (around 8/8/2024).      History of Present Illness     Chief Complaint:   Chief Complaint   Patient presents with    Follow-up       Patient ID: Gold is a 78 y.o. y.o. male has a past medical history of Asthma, Diabetes mellitus (HCC), Environmental allergies, Hyperlipidemia, Hypertension, Macular degeneration (07/13/2020), Orthostatic hypotension, Osteopenia, and Sinusitis.      5/8/2024  HPI: Gold Van is a 78 y.o. male who presents to the office for a hospital follow-up visit.  He is accompanied by his wife.  He was hospitalized at [facility] 4/28 - 4/30 initially presenting with shortness of breath and cough.  He was found to have a right-sided pneumothorax.  Chest tube was placed in the ED.  He initially required up to 5 L nasal cannula.  Prior to hospitalization, he did not have a pulmonologist.  He reports history of COPD/asthma, treated by his PCP.  He had PFTs done years ago. He was on Advair for years and recently switched to Breztri at least 6 months ago. He had a CXR 2 days ago which was clear with no recurrence of pneumothorax.    Patient states he is feeling better, however still has persistent cough.  Cough is worse in the mornings along with mucus. Cough occurs throughout the day, and if he wakes up at night.  Has mild postnasal drainage.  He is not currently on nasal sprays, but uses nasal rinses.  Denies any increasing GERD symptoms, he is taking Pepcid as prescribed.  He has mild shortness of breath which is mostly back to his baseline prior to hospitalization.  He denies any chest pains or  lower extremity swelling. He is currently using albuterol 1-3 times per day in addition to Breztri.      Review of Systems   Constitutional:  Negative for activity change, chills, fever and unexpected weight change.   HENT:  Positive for postnasal drip. Negative for congestion, rhinorrhea, sore throat and trouble swallowing.    Respiratory:  Positive for cough and shortness of breath. Negative for chest tightness and wheezing.    Cardiovascular:  Negative for chest pain, palpitations and leg swelling.   Allergic/Immunologic: Negative.        Historical Information   Past Medical History:   Diagnosis Date    Asthma     Diabetes mellitus (HCC)     Environmental allergies     Hyperlipidemia     Hypertension     Macular degeneration 07/13/2020    right eye    Orthostatic hypotension     Osteopenia     Sinusitis      Past Surgical History:   Procedure Laterality Date    COLONOSCOPY      KNEE CARTILAGE SURGERY Right 1964    VASECTOMY      WISDOM TOOTH EXTRACTION      x4     Family History   Problem Relation Age of Onset    Asthma Mother     Emphysema Mother     Cancer Father     Asthma Brother     Cancer Brother        Smoking history: He reports that he has quit smoking. He has never used smokeless tobacco.    Occupational History:     Immunization History   Administered Date(s) Administered    COVID-19 MODERNA VACC 0.5 ML IM 01/25/2021, 02/22/2021, 10/24/2021, 05/01/2022    COVID-19 Moderna Vac BIVALENT 12 Yr+ IM 0.5 ML 10/21/2022    COVID-19 Moderna mRNA Vaccine 12 Yr+ 50 mcg/0.5 mL (Spikevax) 10/23/2023       Meds/Allergies     Current Outpatient Medications:     albuterol (PROVENTIL HFA,VENTOLIN HFA) 90 mcg/act inhaler, Ventolin HFA 90 mcg/actuation aerosol inhaler, Disp: , Rfl:     amLODIPine (NORVASC) 5 mg tablet, Take 5 mg by mouth 2 (two) times a day, Disp: , Rfl:     atenolol (TENORMIN) 25 mg tablet, Take 25 mg by mouth daily, Disp: , Rfl:     benzonatate (TESSALON PERLES) 100 mg capsule, Take 1 capsule (100 mg  total) by mouth 3 (three) times a day as needed for cough, Disp: 20 capsule, Rfl: 0    Budeson-Glycopyrrol-Formoterol (Breztri Aerosphere) 160-9-4.8 MCG/ACT AERO, Take 2 puffs by mouth Every 12 hours, Disp: , Rfl:     famotidine (PEPCID) 20 mg tablet, Take 1 tablet (20 mg total) by mouth daily (Patient taking differently: Take 20 mg by mouth 2 (two) times a day), Disp: , Rfl: 0    glipiZIDE (GLUCOTROL XL) 2.5 mg 24 hr tablet, Take 2.5 mg by mouth 2 (two) times a day, Disp: , Rfl:     lisinopril (ZESTRIL) 20 mg tablet, Take 20 mg by mouth 2 (two) times a day, Disp: , Rfl:     LORazepam (ATIVAN) 0.5 mg tablet, Take 0.5 mg by mouth daily at bedtime as needed, Disp: , Rfl:     metFORMIN (GLUCOPHAGE) 500 mg tablet, Take 500 mg by mouth 2 (two) times a day with meals, Disp: , Rfl:     montelukast (SINGULAIR) 10 mg tablet, Take 10 mg by mouth in the morning, Disp: , Rfl:     predniSONE 5 mg tablet, prednisone 5 mg tablet  Take 1 tablet every day by oral route as needed for 90 days., Disp: , Rfl:     Allergies:   Allergies   Allergen Reactions    Bactrim [Sulfamethoxazole-Trimethoprim] Fever     Fever of 107         Vitals:  Vitals:    05/08/24 1318   BP: 132/76   BP Location: Right arm   Patient Position: Sitting   Cuff Size: Standard   Pulse: 78   Resp: 20   Temp: 98.2 °F (36.8 °C)   SpO2: 95%   Weight: 75.8 kg (167 lb)   Height: 6' (1.829 m)   Oxygen Therapy  SpO2: 95 %  .  Wt Readings from Last 3 Encounters:   05/08/24 75.8 kg (167 lb)   04/28/24 74.4 kg (164 lb 0.4 oz)   03/18/24 77.8 kg (171 lb 9.6 oz)     Body mass index is 22.65 kg/m².    Physical Exam  Vitals and nursing note reviewed.   Constitutional:       General: He is not in acute distress.     Appearance: Normal appearance. He is well-developed.   Cardiovascular:      Rate and Rhythm: Normal rate and regular rhythm.      Heart sounds: Normal heart sounds. No murmur heard.  Pulmonary:      Effort: Pulmonary effort is normal. No respiratory distress.       "Breath sounds: Normal breath sounds. No decreased breath sounds, wheezing, rhonchi or rales.   Musculoskeletal:         General: No swelling.      Right lower leg: No edema.      Left lower leg: No edema.   Psychiatric:         Mood and Affect: Mood and affect normal.         Behavior: Behavior normal. Behavior is cooperative.           Labs: I have personally reviewed pertinent lab results.  Lab Results   Component Value Date    WBC 8.59 04/30/2024    HGB 13.7 04/30/2024    HCT 41.1 04/30/2024    MCV 90 04/30/2024     04/30/2024     Lab Results   Component Value Date    GLUCOSE 179 (H) 04/14/2015    CALCIUM 8.9 04/30/2024     04/14/2015    K 3.7 04/30/2024    CO2 26 04/30/2024    CL 99 04/30/2024    BUN 17 04/30/2024    CREATININE 0.98 04/30/2024     No results found for: \"IGE\"  Lab Results   Component Value Date    ALT 15 03/25/2024    AST 14 03/25/2024    ALKPHOS 66 03/25/2024    BILITOT 0.6 08/12/2014       Studies:    Imaging and other studies: I have personally reviewed pertinent reports and I have personally reviewed pertinent films in PACS     CT chest 4/20/2024  Moderate panlobular emphysema.  Mild dependent atelectasis in the right lung.  Large right pneumothorax.    Chest x-ray 5/6/2024  Lungs are clear.  No pneumothorax or pleural effusion.    EKG, Pathology, and Other Studies: I have personally reviewed pertinent reports.        Pulmonary function testing: none prior to review  "

## 2024-05-08 NOTE — ASSESSMENT & PLAN NOTE
-S/p hospitalization for spontaneous right-sided pneumothorax after increased cough x 1 week.  Resolved with chest tube.  Recent chest x-ray 2 days ago showed no recurrence, clear lungs  -Patient still with daily cough, mild shortness of breath, improved from prior to hospitalization, but not back to his baseline  -SpO2 95% on room air today, lungs clear on exam    Plan:  -Continue to monitor.  Patient advised if he develops any new sudden increase shortness of breath or cough to proceed to ED for further evaluation of recurrence of pneumothorax

## 2024-05-08 NOTE — ASSESSMENT & PLAN NOTE
-Began prior to his hospitalization, improving, however, persistent  -Likely contributed from his history of asthma, allergies, postnasal drainage, recent pneumothorax with reexpansion, and COPD  -Encouraged patient to continue nasal rinses, add Flonase nasal spray for postnasal drainage.  Continue montelukast 10 mg nightly and OTC antihistamine   -May continue Tessalon Perles 3 times a day as needed

## 2024-05-10 ENCOUNTER — TELEPHONE (OUTPATIENT)
Age: 79
End: 2024-05-10

## 2024-05-10 NOTE — TELEPHONE ENCOUNTER
Made switch to Trelegy and has been having some difficulties, has been using rescue inhalers and nebulizer. His wife informed that it may take a week to feel better.     He has only tried the Trelegy twice, but still doesn't feel any better. He wants to know how long it will take before he feels better.     He also wants to know if he should hold off on his PFT that is coming up if he isn't able to tolerate the switch of medication.     Informed that it can take about a week or more to accustom to a change in medications, but that it also depends on how he feels. He felt better knowing this and would just like more recommendation from Laurie LINDO.       Will like cb.

## 2024-05-13 NOTE — TELEPHONE ENCOUNTER
Pt calling back in to receive a call back on what he should do with the trelegy, as it still is not working for him. Please advise.

## 2024-05-13 NOTE — TELEPHONE ENCOUNTER
Laurie is out of office today and will be back tomorrow.  I tried to call patient.  Was not able to leave a voicemail.  Tona Alba     Daily or almost daily

## 2024-05-13 NOTE — TELEPHONE ENCOUNTER
Incoming call:    Patient requesting update/ communication by BELGICA Lara regarding prior message.     Also requesting LOUISE to be canceled.

## 2024-05-16 ENCOUNTER — TELEPHONE (OUTPATIENT)
Dept: PULMONOLOGY | Facility: CLINIC | Age: 79
End: 2024-05-16

## 2024-05-16 NOTE — TELEPHONE ENCOUNTER
Patient stopped in the office to get patient assistance form and sample.  Patient was given [employer] form and one Triligy 100 sample. Documented in our sample book.

## 2024-05-20 ENCOUNTER — TELEPHONE (OUTPATIENT)
Age: 79
End: 2024-05-20

## 2024-05-20 NOTE — TELEPHONE ENCOUNTER
Patient looking to schedule MOHS consult. He is being referred from [facility]. Please advise and call pt to schedule.

## 2024-05-21 NOTE — TELEPHONE ENCOUNTER
Spoke to patient, he stated he does not need a consult unless necessary. Did ask patient to have records faxed to our office at [phone]. Understanding verbalized. When records received we can schedule Mohs.

## 2024-06-07 PROBLEM — R05.9 COUGH: Status: RESOLVED | Noted: 2024-05-08 | Resolved: 2024-06-07

## 2024-06-26 ENCOUNTER — APPOINTMENT (OUTPATIENT)
Age: 79
End: 2024-06-26
Payer: COMMERCIAL

## 2024-06-26 DIAGNOSIS — Z79.899 ENCOUNTER FOR LONG-TERM (CURRENT) USE OF OTHER MEDICATIONS: ICD-10-CM

## 2024-06-26 DIAGNOSIS — I10 ESSENTIAL HYPERTENSION, MALIGNANT: ICD-10-CM

## 2024-06-26 DIAGNOSIS — E55.9 VITAMIN D DEFICIENCY DISEASE: ICD-10-CM

## 2024-06-26 DIAGNOSIS — E11.69 TYPE 2 DIABETES MELLITUS WITH OTHER SPECIFIED COMPLICATION, UNSPECIFIED WHETHER LONG TERM INSULIN USE (HCC): ICD-10-CM

## 2024-06-26 DIAGNOSIS — E78.5 HYPERLIPIDEMIA, UNSPECIFIED HYPERLIPIDEMIA TYPE: ICD-10-CM

## 2024-06-26 LAB
25(OH)D3 SERPL-MCNC: 28.4 NG/ML (ref 30–100)
ALBUMIN SERPL BCG-MCNC: 3.9 G/DL (ref 3.5–5)
ALP SERPL-CCNC: 59 U/L (ref 34–104)
ALT SERPL W P-5'-P-CCNC: 17 U/L (ref 7–52)
ANION GAP SERPL CALCULATED.3IONS-SCNC: 11 MMOL/L (ref 4–13)
AST SERPL W P-5'-P-CCNC: 17 U/L (ref 13–39)
BILIRUB SERPL-MCNC: 0.52 MG/DL (ref 0.2–1)
BUN SERPL-MCNC: 23 MG/DL (ref 5–25)
CALCIUM SERPL-MCNC: 9.4 MG/DL (ref 8.4–10.2)
CHLORIDE SERPL-SCNC: 101 MMOL/L (ref 96–108)
CHOLEST SERPL-MCNC: 197 MG/DL
CO2 SERPL-SCNC: 26 MMOL/L (ref 21–32)
CREAT SERPL-MCNC: 0.95 MG/DL (ref 0.6–1.3)
CREAT UR-MCNC: 30.5 MG/DL
ERYTHROCYTE [DISTWIDTH] IN BLOOD BY AUTOMATED COUNT: 14.5 % (ref 11.6–15.1)
EST. AVERAGE GLUCOSE BLD GHB EST-MCNC: 223 MG/DL
GFR SERPL CREATININE-BSD FRML MDRD: 76 ML/MIN/1.73SQ M
GLUCOSE P FAST SERPL-MCNC: 102 MG/DL (ref 65–99)
HBA1C MFR BLD: 9.4 %
HCT VFR BLD AUTO: 41.7 % (ref 36.5–49.3)
HDLC SERPL-MCNC: 65 MG/DL
HGB BLD-MCNC: 13.5 G/DL (ref 12–17)
LDLC SERPL CALC-MCNC: 112 MG/DL (ref 0–100)
MCH RBC QN AUTO: 30.9 PG (ref 26.8–34.3)
MCHC RBC AUTO-ENTMCNC: 32.4 G/DL (ref 31.4–37.4)
MCV RBC AUTO: 95 FL (ref 82–98)
MICROALBUMIN UR-MCNC: 82.5 MG/L
MICROALBUMIN/CREAT 24H UR: 270 MG/G CREATININE (ref 0–30)
NONHDLC SERPL-MCNC: 132 MG/DL
PLATELET # BLD AUTO: 248 THOUSANDS/UL (ref 149–390)
PMV BLD AUTO: 10.1 FL (ref 8.9–12.7)
POTASSIUM SERPL-SCNC: 4.4 MMOL/L (ref 3.5–5.3)
PROT SERPL-MCNC: 6.7 G/DL (ref 6.4–8.4)
RBC # BLD AUTO: 4.37 MILLION/UL (ref 3.88–5.62)
SODIUM SERPL-SCNC: 138 MMOL/L (ref 135–147)
T4 FREE SERPL-MCNC: 0.85 NG/DL (ref 0.61–1.12)
TRIGL SERPL-MCNC: 99 MG/DL
TSH SERPL DL<=0.05 MIU/L-ACNC: 2.87 UIU/ML (ref 0.45–4.5)
WBC # BLD AUTO: 8.56 THOUSAND/UL (ref 4.31–10.16)

## 2024-06-26 PROCEDURE — 82043 UR ALBUMIN QUANTITATIVE: CPT

## 2024-06-26 PROCEDURE — 84443 ASSAY THYROID STIM HORMONE: CPT

## 2024-06-26 PROCEDURE — 80053 COMPREHEN METABOLIC PANEL: CPT

## 2024-06-26 PROCEDURE — 85027 COMPLETE CBC AUTOMATED: CPT

## 2024-06-26 PROCEDURE — 80061 LIPID PANEL: CPT

## 2024-06-26 PROCEDURE — 36415 COLL VENOUS BLD VENIPUNCTURE: CPT

## 2024-06-26 PROCEDURE — 82306 VITAMIN D 25 HYDROXY: CPT

## 2024-06-26 PROCEDURE — 82570 ASSAY OF URINE CREATININE: CPT

## 2024-06-26 PROCEDURE — 83036 HEMOGLOBIN GLYCOSYLATED A1C: CPT

## 2024-06-26 PROCEDURE — 84439 ASSAY OF FREE THYROXINE: CPT

## 2024-07-18 ENCOUNTER — EVALUATION (OUTPATIENT)
Dept: PHYSICAL THERAPY | Facility: CLINIC | Age: 79
End: 2024-07-18
Payer: COMMERCIAL

## 2024-07-18 VITALS — SYSTOLIC BLOOD PRESSURE: 139 MMHG | DIASTOLIC BLOOD PRESSURE: 64 MMHG | HEART RATE: 68 BPM

## 2024-07-18 DIAGNOSIS — R26.89 BALANCE PROBLEM: Primary | ICD-10-CM

## 2024-07-18 DIAGNOSIS — R53.1 GENERALIZED WEAKNESS: ICD-10-CM

## 2024-07-18 PROCEDURE — 97162 PT EVAL MOD COMPLEX 30 MIN: CPT | Performed by: PHYSICAL THERAPIST

## 2024-07-18 PROCEDURE — 97110 THERAPEUTIC EXERCISES: CPT | Performed by: PHYSICAL THERAPIST

## 2024-07-18 NOTE — PROGRESS NOTES
"PT Evaluation     Today's date: 2024  Patient name: Gold Van  : 1945  MRN: [id number]  Referring provider: Timbo Diaz MD  Dx:   Encounter Diagnosis     ICD-10-CM    1. Balance problem  R26.89       2. Generalized weakness  R53.1                      Assessment  Impairments: abnormal gait, abnormal muscle firing, abnormal or restricted ROM, activity intolerance, impaired balance, impaired physical strength and lacks appropriate home exercise program  Functional limitations: limited balance, gait abnormalities    Assessment details: Gold \"Scooter\" Carlota is a 78 y.o. male who presents with complaints of problems with his balance and gait. He denies any injury or pain. He was hospitalized in April with diagnosis of pneumothorax, however he feels he has mostly recovered from that. He works out at the gym 3 days/week but still feels weak in his Les. Upon exam, found patient to have weakness in L ankle DF, great toe extension and PF (L4, L5, S1 myotomes). Patient denies h/o injury, spine surgery or LBP in general. Unable to determine cause for myotomal weakness based on clinical exam. Will recommend referral to neuro if symptoms do not improve with PT. Weakness in L ankle is contributing to patient's balance deficits and gait abnormality. Patient was instructed on extensive HEP as he will not be able to attend PT regularly secondary to financial burden of copays. Will follow up with patient in 2-3 weeks to monitor response to HEP. Patient is to contact PT sooner if problems worsen.  Prognosis is good given HEP compliance.  Please contact me if you have any questions or recommendations.  Thank you for the opportunity to share in  Scooter's care.   Understanding of Dx/Px/POC: good     Prognosis: good    Goals  STGs  1) In 4 weeks patient will demonstrate independence with HEP  2) In 4 weeks patient will demonstrate 1/2 grade improvement in L ankle MMT DF and PF    LTGs  1) In 6-8 weeks patient will " report little to no difficulty with walking or stair climbing    Plan  Patient would benefit from: skilled physical therapy  Referral necessary: No    Planned therapy interventions: balance, balance/weight bearing training, abdominal trunk stabilization, neuromuscular re-education, postural training, strengthening, stretching, therapeutic activities, therapeutic exercise, flexibility, functional ROM exercises, gait training and home exercise program    Frequency: Every other week  Duration in weeks: 8  Plan of Care beginning date: 7/18/2024  Plan of Care expiration date: 9/12/2024  Treatment plan discussed with: patient  Plan details: Will follow up with patient via phone call in 2-3 weeks to monitor response to PT. Will schedule additional visits if needed at that time.        Subjective Evaluation    History of Present Illness  Mechanism of injury: -patient presents with c/o feeling slightly unstable  -he has a recent h/o a collapsed lung in April and feels he has not fully recovered his strength in his legs despite going to the gym approx 3 days/week  Patient Goals  Patient goals for therapy: increased strength and improved balance    Pain  No pain reported    Social Support  Stairs in house: yes   Lives in: multiple-level home  Lives with: spouse    Employment status: not working    Diagnostic Tests  No diagnostic tests performed  Treatments  No previous or current treatments        Objective     Neurological Testing     Sensation     Lumbar   Left   Intact: light touch    Right   Intact: light touch    Reflexes   Left   Patellar (L4): absent (0)  Achilles (S1): absent (0)    Right   Patellar (L4): normal (2+)  Achilles (S1): trace (1+)    Joint Play     Hypomobile: L1, L2, L3, L4, L5 and S1     Strength/Myotome Testing     Left Hip   Planes of Motion   Flexion: 4+  Abduction: 4    Right Hip   Planes of Motion   Flexion: 4+  Abduction: 4    Left Knee   Flexion: 5  Extension: 5    Right Knee   Flexion:  "5  Extension: 5    Left Ankle/Foot   Dorsiflexion: 2+  Plantar flexion: 3+  Great toe extension: 2+    Right Ankle/Foot   Dorsiflexion: 5  Plantar flexion: 5  Great toe extension: 5    Ambulation     Ambulation: Level Surfaces     Additional Level Surfaces Ambulation Details  Ambulates with L LE externally rotated  Lacks heel strike on LLE                Precautions: none  POC exp 9/12/24  HEP Access Code: [email or web address] 7/18/24  Manuals 7/18                                       Neuro Re-Ed                                                                Ther Ex        Upright bike for strength and endurance        Knee ext machine 40# 3x10       HS curls machine 60# 3x10       Step ups 6\" x10 ea       Squats  Chair behind  2x10       Calf stretch 30\"x3 standing       HR/TR Seated x20 ea       Ankle TB DF and PF Instructed for HEP       Prone lay X2 mins       Prone on elbows X2 mins                               Ther Activity                        Gait Training                        Modalities                           Access Code: [email or web address]  URL: [email or web address]  Date: 07/18/2024  Prepared by: Alice Muniz    Exercises  - Knee Extension with Weight Machine  - 1 x daily - 3 x weekly - 3 sets - 10 reps  - Hamstring Curl with Weight Machine  - 1 x daily - 3 x weekly - 3 sets - 10 reps  - Squat with Chair Touch  - 1 x daily - 3 x weekly - 3 sets - 10 reps  - Straight Leg Raise  - 1 x daily - 3 x weekly - 3 sets - 10 reps  - Lying Prone  - 2 x daily - 7 x weekly - 2-3 mins hold  - Static Prone on Elbows  - 2 x daily - 7 x weekly - 2- 3 mins hold  - Gastroc Stretch on Wall  - 2 x daily - 7 x weekly - 3 reps - 30 sec hold  - Long Sitting Ankle Dorsiflexion with Anchored Resistance  - 1 x daily - 7 x weekly - 30 reps  - Long Sitting Ankle Plantar Flexion with Resistance  - 1 x daily - 7 x weekly - 30 reps  - Forward Step Up with Counter Support  - 1 x daily - 7 x weekly - 3 sets - 10 reps  - " Seated Heel Toe Raises  - 1 x daily - 7 x weekly - 30 reps

## 2024-07-18 NOTE — LETTER
"2024    Timbo Diaz MD  [address]  [place] [address]    Patient: Gold Van   YOB: 1945   Date of Visit: 2024     Encounter Diagnosis     ICD-10-CM    1. Balance problem  R26.89       2. Generalized weakness  R53.1           Dear Dr. Diaz:    Thank you for your recent referral of Gold Van. Please review the attached evaluation summary from Gold's recent visit.     Please verify that you agree with the plan of care by signing the attached order.     If you have any questions or concerns, please do not hesitate to call.     I sincerely appreciate the opportunity to share in the care of one of your patients and hope to have another opportunity to work with you in the near future.       Sincerely,    Alice Muniz, PT      Referring Provider:      I certify that I have read the below Plan of Care and certify the need for these services furnished under this plan of treatment while under my care.                    Timbo Diaz MD  [address]  [place] [address]  Via Fax: [phone]          PT Evaluation     Today's date: 2024  Patient name: Gold Van  : 1945  MRN: [id number]  Referring provider: Timbo Diaz MD  Dx:   Encounter Diagnosis     ICD-10-CM    1. Balance problem  R26.89       2. Generalized weakness  R53.1                      Assessment  Impairments: abnormal gait, abnormal muscle firing, abnormal or restricted ROM, activity intolerance, impaired balance, impaired physical strength and lacks appropriate home exercise program  Functional limitations: limited balance, gait abnormalities    Assessment details: Gold \"Scooter\" Carlota is a 78 y.o. male who presents with complaints of problems with his balance and gait. He denies any injury or pain. He was hospitalized in April with diagnosis of pneumothorax, however he feels he has mostly recovered from that. He works out at the gym 3 days/week but still " feels weak in his Les. Upon exam, found patient to have weakness in L ankle DF, great toe extension and PF (L4, L5, S1 myotomes). Patient denies h/o injury, spine surgery or LBP in general. Unable to determine cause for myotomal weakness based on clinical exam. Will recommend referral to neuro if symptoms do not improve with PT. Weakness in L ankle is contributing to patient's balance deficits and gait abnormality. Patient was instructed on extensive HEP as he will not be able to attend PT regularly secondary to financial burden of copays. Will follow up with patient in 2-3 weeks to monitor response to HEP. Patient is to contact PT sooner if problems worsen.  Prognosis is good given HEP compliance.  Please contact me if you have any questions or recommendations.  Thank you for the opportunity to share in  Scootre's care.   Understanding of Dx/Px/POC: good     Prognosis: good    Goals  STGs  1) In 4 weeks patient will demonstrate independence with HEP  2) In 4 weeks patient will demonstrate 1/2 grade improvement in L ankle MMT DF and PF    LTGs  1) In 6-8 weeks patient will report little to no difficulty with walking or stair climbing    Plan  Patient would benefit from: skilled physical therapy  Referral necessary: No    Planned therapy interventions: balance, balance/weight bearing training, abdominal trunk stabilization, neuromuscular re-education, postural training, strengthening, stretching, therapeutic activities, therapeutic exercise, flexibility, functional ROM exercises, gait training and home exercise program    Frequency: Every other week  Duration in weeks: 8  Plan of Care beginning date: 7/18/2024  Plan of Care expiration date: 9/12/2024  Treatment plan discussed with: patient  Plan details: Will follow up with patient via phone call in 2-3 weeks to monitor response to PT. Will schedule additional visits if needed at that time.        Subjective Evaluation    History of Present Illness  Mechanism of injury:  "-patient presents with c/o feeling slightly unstable  -he has a recent h/o a collapsed lung in April and feels he has not fully recovered his strength in his legs despite going to the gym approx 3 days/week  Patient Goals  Patient goals for therapy: increased strength and improved balance    Pain  No pain reported    Social Support  Stairs in house: yes   Lives in: multiple-level home  Lives with: spouse    Employment status: not working    Diagnostic Tests  No diagnostic tests performed  Treatments  No previous or current treatments        Objective     Neurological Testing     Sensation     Lumbar   Left   Intact: light touch    Right   Intact: light touch    Reflexes   Left   Patellar (L4): absent (0)  Achilles (S1): absent (0)    Right   Patellar (L4): normal (2+)  Achilles (S1): trace (1+)    Joint Play     Hypomobile: L1, L2, L3, L4, L5 and S1     Strength/Myotome Testing     Left Hip   Planes of Motion   Flexion: 4+  Abduction: 4    Right Hip   Planes of Motion   Flexion: 4+  Abduction: 4    Left Knee   Flexion: 5  Extension: 5    Right Knee   Flexion: 5  Extension: 5    Left Ankle/Foot   Dorsiflexion: 2+  Plantar flexion: 3+  Great toe extension: 2+    Right Ankle/Foot   Dorsiflexion: 5  Plantar flexion: 5  Great toe extension: 5    Ambulation     Ambulation: Level Surfaces     Additional Level Surfaces Ambulation Details  Ambulates with L LE externally rotated  Lacks heel strike on LLE                Precautions: none  POC exp 9/12/24  HEP Access Code: [email or web address] 7/18/24  Manuals 7/18                                       Neuro Re-Ed                                                                Ther Ex        Upright bike for strength and endurance        Knee ext machine 40# 3x10       HS curls machine 60# 3x10       Step ups 6\" x10 ea       Squats  Chair behind  2x10       Calf stretch 30\"x3 standing       HR/TR Seated x20 ea       Ankle TB DF and PF Instructed for HEP       Prone lay X2 mins     "   Prone on elbows X2 mins                               Ther Activity                        Gait Training                        Modalities                           Access Code: [email or web address]  URL: [email or web address]  Date: 07/18/2024  Prepared by: Alice Muniz    Exercises  - Knee Extension with Weight Machine  - 1 x daily - 3 x weekly - 3 sets - 10 reps  - Hamstring Curl with Weight Machine  - 1 x daily - 3 x weekly - 3 sets - 10 reps  - Squat with Chair Touch  - 1 x daily - 3 x weekly - 3 sets - 10 reps  - Straight Leg Raise  - 1 x daily - 3 x weekly - 3 sets - 10 reps  - Lying Prone  - 2 x daily - 7 x weekly - 2-3 mins hold  - Static Prone on Elbows  - 2 x daily - 7 x weekly - 2- 3 mins hold  - Gastroc Stretch on Wall  - 2 x daily - 7 x weekly - 3 reps - 30 sec hold  - Long Sitting Ankle Dorsiflexion with Anchored Resistance  - 1 x daily - 7 x weekly - 30 reps  - Long Sitting Ankle Plantar Flexion with Resistance  - 1 x daily - 7 x weekly - 30 reps  - Forward Step Up with Counter Support  - 1 x daily - 7 x weekly - 3 sets - 10 reps  - Seated Heel Toe Raises  - 1 x daily - 7 x weekly - 30 reps

## 2024-07-23 ENCOUNTER — HOSPITAL ENCOUNTER (OUTPATIENT)
Dept: PULMONOLOGY | Facility: HOSPITAL | Age: 79
Discharge: HOME | End: 2024-07-23
Payer: COMMERCIAL

## 2024-07-23 DIAGNOSIS — R06.02 SHORTNESS OF BREATH: ICD-10-CM

## 2024-07-23 DIAGNOSIS — J43.1 PANLOBULAR EMPHYSEMA (HCC): ICD-10-CM

## 2024-07-23 PROCEDURE — 94729 DIFFUSING CAPACITY: CPT

## 2024-07-23 PROCEDURE — 94618 PULMONARY STRESS TESTING: CPT

## 2024-07-23 PROCEDURE — 94060 EVALUATION OF WHEEZING: CPT

## 2024-07-23 PROCEDURE — 94726 PLETHYSMOGRAPHY LUNG VOLUMES: CPT

## 2024-07-23 RX ORDER — ALBUTEROL SULFATE 2.5 MG/3ML
2.5 SOLUTION RESPIRATORY (INHALATION) ONCE AS NEEDED
Status: COMPLETED | OUTPATIENT
Start: 2024-07-23 | End: 2024-07-23

## 2024-07-23 RX ADMIN — ALBUTEROL SULFATE 2.5 MG: 2.5 SOLUTION RESPIRATORY (INHALATION) at 16:40

## 2024-08-07 ENCOUNTER — OFFICE VISIT (OUTPATIENT)
Dept: PULMONOLOGY | Facility: CLINIC | Age: 79
End: 2024-08-07
Payer: COMMERCIAL

## 2024-08-07 VITALS
BODY MASS INDEX: 23.03 KG/M2 | HEIGHT: 72 IN | TEMPERATURE: 98.3 F | OXYGEN SATURATION: 98 % | SYSTOLIC BLOOD PRESSURE: 144 MMHG | DIASTOLIC BLOOD PRESSURE: 68 MMHG | WEIGHT: 170 LBS | HEART RATE: 84 BPM

## 2024-08-07 DIAGNOSIS — Z87.09 HISTORY OF PNEUMOTHORAX: ICD-10-CM

## 2024-08-07 DIAGNOSIS — J44.89 COPD WITH ASTHMA: Primary | ICD-10-CM

## 2024-08-07 DIAGNOSIS — J43.1 PANLOBULAR EMPHYSEMA (HCC): ICD-10-CM

## 2024-08-07 PROCEDURE — 99213 OFFICE O/P EST LOW 20 MIN: CPT

## 2024-08-07 NOTE — ASSESSMENT & PLAN NOTE
-Reviewed recent PFT results with the patient.  Showed moderate obstruction with FEV1 50%, a positive bronchodilator response, moderate air trapping and decreased diffusion capacity  -Patient also has moderate panlobular emphysema on CT imaging  -Carries approximately 30-pack-year smoking history, but quit 38 years ago  -Symptoms are currently well-controlled without any recent exacerbations or frequent need for rescue inhaler  -He will continue Breztri 2 puffs twice daily and albuterol every 6 hours as needed  -Will follow-up in 1 year or sooner if needed

## 2024-08-07 NOTE — ASSESSMENT & PLAN NOTE
-History of spontaneous right-sided pneumothorax in April of this year after episode of coughing.  Resolved with chest tube.  No evidence of recurrence.  He is currently asymptomatic  -Continue to monitor.  Patient aware if he develops any new sudden increase shortness of breath or cough to proceed to ED

## 2024-08-07 NOTE — PROGRESS NOTES
Pulmonary Follow Up Note  Gold Van 78 y.o. male MRN: [id number]  8/7/2024    Assessment/Plan:    History of pneumothorax  -History of spontaneous right-sided pneumothorax in April of this year after episode of coughing.  Resolved with chest tube.  No evidence of recurrence.  He is currently asymptomatic  -Continue to monitor.  Patient aware if he develops any new sudden increase shortness of breath or cough to proceed to ED    COPD with asthma  -Reviewed recent PFT results with the patient.  Showed moderate obstruction with FEV1 50%, a positive bronchodilator response, moderate air trapping and decreased diffusion capacity  -Patient also has moderate panlobular emphysema on CT imaging  -Carries approximately 30-pack-year smoking history, but quit 38 years ago  -Symptoms are currently well-controlled without any recent exacerbations or frequent need for rescue inhaler  -He will continue Breztri 2 puffs twice daily and albuterol every 6 hours as needed  -Will follow-up in 1 year or sooner if needed        Diagnoses and all orders for this visit:    COPD with asthma    Panlobular emphysema (HCC)    History of pneumothorax        Return in about 1 year (around 8/7/2025).      History of Present Illness     Chief Complaint:   Chief Complaint   Patient presents with    Follow-up     Patient states his breathing is great       Patient ID: Gold is a 78 y.o. y.o. male has a past medical history of Asthma, Diabetes mellitus (HCC), Environmental allergies, Hyperlipidemia, Hypertension, Macular degeneration (07/13/2020), Orthostatic hypotension, Osteopenia, Pneumothorax, and Sinusitis.      8/7/2024  HPI: Gold Van is a 78 y.o. male who presents to the office for a follow-up visit for his COPD/asthma.  He is accompanied by his wife.  He was hospitalized back in April for spontaneous right-sided pneumothorax requiring chest tube placement.  He has been doing well ever since.  He had recent PFTs to evaluate severity  of his lung disease.  This showed moderate COPD/asthma.  He had moderate air trapping and decreased diffusion capacity.  There was positive postbronchodilator response.  He has been maintained on Breztri. He tried a sample of the Trelegy, but did not like this and returned back to the [facility].  He is doing really well.  He goes to the gym 3 times per week, only needs to use his albuterol inhaler prior to exercising.  No shortness of breath, cough, wheezing, chest pain or chest tightness.  He is feeling well today and has no concerns or complaints.        Review of Systems   Constitutional:  Negative for activity change, appetite change, chills, fever and unexpected weight change.   HENT:  Negative for congestion, ear pain, postnasal drip, rhinorrhea, sneezing, sore throat and trouble swallowing.    Respiratory:  Negative for cough, chest tightness, shortness of breath and wheezing.    Cardiovascular:  Negative for chest pain, palpitations and leg swelling.   Musculoskeletal:  Negative for myalgias.   Allergic/Immunologic: Negative.    Neurological:  Negative for headaches.       Historical Information   Past Medical History:   Diagnosis Date    Asthma     Diabetes mellitus (HCC)     Environmental allergies     Hyperlipidemia     Hypertension     Macular degeneration 07/13/2020    right eye    Orthostatic hypotension     Osteopenia     Pneumothorax     Sinusitis      Past Surgical History:   Procedure Laterality Date    COLONOSCOPY      KNEE CARTILAGE SURGERY Right 1964    VASECTOMY      WISDOM TOOTH EXTRACTION      x4     Family History   Problem Relation Age of Onset    Asthma Mother     Emphysema Mother     Cancer Father     Asthma Brother     Cancer Brother        Smoking history: He reports that he has quit smoking. He has never used smokeless tobacco.    Occupational History:     Immunization History   Administered Date(s) Administered    COVID-19 MODERNA VACC 0.5 ML IM 01/25/2021, 02/22/2021, 10/24/2021,  05/01/2022    COVID-19 Moderna Vac BIVALENT 12 Yr+ IM 0.5 ML 10/21/2022    COVID-19 Moderna mRNA Vaccine 12 Yr+ 50 mcg/0.5 mL (Spikevax) 10/23/2023       Meds/Allergies     Current Outpatient Medications:     albuterol (PROVENTIL HFA,VENTOLIN HFA) 90 mcg/act inhaler, Ventolin HFA 90 mcg/actuation aerosol inhaler, Disp: , Rfl:     amLODIPine (NORVASC) 5 mg tablet, Take 5 mg by mouth 2 (two) times a day, Disp: , Rfl:     atenolol (TENORMIN) 25 mg tablet, Take 25 mg by mouth daily, Disp: , Rfl:     benzonatate (TESSALON PERLES) 100 mg capsule, Take 1 capsule (100 mg total) by mouth 3 (three) times a day as needed for cough, Disp: 20 capsule, Rfl: 0    Budeson-Glycopyrrol-Formoterol (Breztri Aerosphere) 160-9-4.8 MCG/ACT AERO, Take 2 puffs by mouth Every 12 hours, Disp: , Rfl:     famotidine (PEPCID) 20 mg tablet, Take 1 tablet (20 mg total) by mouth daily (Patient taking differently: Take 20 mg by mouth 2 (two) times a day), Disp: , Rfl: 0    glipiZIDE (GLUCOTROL XL) 2.5 mg 24 hr tablet, Take 2.5 mg by mouth 2 (two) times a day, Disp: , Rfl:     lisinopril (ZESTRIL) 20 mg tablet, Take 20 mg by mouth 2 (two) times a day, Disp: , Rfl:     LORazepam (ATIVAN) 0.5 mg tablet, Take 0.5 mg by mouth daily at bedtime as needed, Disp: , Rfl:     metFORMIN (GLUCOPHAGE) 500 mg tablet, Take 500 mg by mouth 2 (two) times a day with meals, Disp: , Rfl:     montelukast (SINGULAIR) 10 mg tablet, Take 10 mg by mouth in the morning, Disp: , Rfl:     predniSONE 5 mg tablet, prednisone 5 mg tablet  Take 1 tablet every day by oral route as needed for 90 days., Disp: , Rfl:     Allergies:   Allergies   Allergen Reactions    Ciprofloxacin Shortness Of Breath    Sulfamethoxazole Shortness Of Breath    Trimethoprim Shortness Of Breath    Dexamethasone Other (See Comments)    Triamcinolone Other (See Comments)    Bactrim [Sulfamethoxazole-Trimethoprim] Fever     Fever of 107         Vitals:  Vitals:    08/07/24 1356   BP: 144/68   BP Location:  "Right arm   Patient Position: Sitting   Cuff Size: Adult   Pulse: 84   Temp: 98.3 °F (36.8 °C)   TempSrc: Temporal   SpO2: 98%   Weight: 77.1 kg (170 lb)   Height: 6' (1.829 m)   Oxygen Therapy  SpO2: 98 %  .  Wt Readings from Last 3 Encounters:   08/07/24 77.1 kg (170 lb)   05/08/24 75.8 kg (167 lb)   04/28/24 74.4 kg (164 lb 0.4 oz)     Body mass index is 23.06 kg/m².    Physical Exam  Vitals and nursing note reviewed.   Constitutional:       General: He is not in acute distress.     Appearance: Normal appearance. He is well-developed.   Cardiovascular:      Rate and Rhythm: Normal rate and regular rhythm.      Heart sounds: Normal heart sounds. No murmur heard.  Pulmonary:      Effort: Pulmonary effort is normal. No respiratory distress.      Breath sounds: Normal breath sounds. No decreased breath sounds, wheezing, rhonchi or rales.   Musculoskeletal:         General: No swelling.      Right lower leg: No edema.      Left lower leg: No edema.   Psychiatric:         Mood and Affect: Mood and affect normal.         Behavior: Behavior normal. Behavior is cooperative.           Labs: I have personally reviewed pertinent lab results.  Lab Results   Component Value Date    WBC 8.56 06/26/2024    HGB 13.5 06/26/2024    HCT 41.7 06/26/2024    MCV 95 06/26/2024     06/26/2024     Lab Results   Component Value Date    GLUCOSE 179 (H) 04/14/2015    CALCIUM 9.4 06/26/2024     04/14/2015    K 4.4 06/26/2024    CO2 26 06/26/2024     06/26/2024    BUN 23 06/26/2024    CREATININE 0.95 06/26/2024     No results found for: \"IGE\"  Lab Results   Component Value Date    ALT 17 06/26/2024    AST 17 06/26/2024    ALKPHOS 59 06/26/2024    BILITOT 0.6 08/12/2014       Studies:    Imaging and other studies: I have personally reviewed pertinent reports and I have personally reviewed pertinent films in PACS     CT chest 4/20/2024  Moderate panlobular emphysema.  Mild dependent atelectasis in the right lung.  Large right " pneumothorax.    Chest x-ray 5/6/2024  Lungs are clear.  No pneumothorax or pleural effusion.    EKG, Pathology, and Other Studies: I have personally reviewed pertinent reports.        Pulmonary function testing: none prior to review    Pulmonary Functions Testing Results: 7/23/2024    FEV1/FVC ratio 50%    FEV1 50% predicted  FVC 75% predicted  (+) response to bronchodilators   % predicted   % predicted  DLCO corrected for hemoglobin 55 % predicted    Impression: Moderate obstructive airflow defect on spirometry.  Positive bronchodilator response.  Air trapping as indicated by the lung volumes.  Moderate decrease in diffusing capacity.  Flow volume loop consistent with obstruction.      A 6-minute walk test was performed on room air, the patient did not have clinical hypoxia.  There was a normal heart rate response to exercise.

## 2024-08-08 ENCOUNTER — TELEPHONE (OUTPATIENT)
Age: 79
End: 2024-08-08

## 2024-08-08 NOTE — TELEPHONE ENCOUNTER
Patient called and asked that his AVS be mailed to him from his last visit 8/7/24 with nicholas dockery

## 2024-08-26 ENCOUNTER — TELEPHONE (OUTPATIENT)
Age: 79
End: 2024-08-26

## 2024-09-24 ENCOUNTER — HOSPITAL ENCOUNTER (INPATIENT)
Facility: HOSPITAL | Age: 79
LOS: 2 days | Discharge: HOME | DRG: 178 | End: 2024-09-26
Attending: FAMILY MEDICINE | Admitting: INTERNAL MEDICINE
Payer: COMMERCIAL

## 2024-09-24 ENCOUNTER — APPOINTMENT (EMERGENCY)
Dept: RADIOLOGY | Facility: HOSPITAL | Age: 79
DRG: 178 | End: 2024-09-24
Payer: COMMERCIAL

## 2024-09-24 DIAGNOSIS — R06.02 SOB (SHORTNESS OF BREATH): ICD-10-CM

## 2024-09-24 DIAGNOSIS — R09.02 HYPOXIA: Primary | ICD-10-CM

## 2024-09-24 DIAGNOSIS — R50.9 FEVER: ICD-10-CM

## 2024-09-24 DIAGNOSIS — N19 RENAL FAILURE: ICD-10-CM

## 2024-09-24 DIAGNOSIS — U07.1 COVID: ICD-10-CM

## 2024-09-24 PROBLEM — R06.89 ACUTE RESPIRATORY INSUFFICIENCY: Status: ACTIVE | Noted: 2024-09-24

## 2024-09-24 LAB
ALBUMIN SERPL BCG-MCNC: 3 G/DL (ref 3.5–5)
ALP SERPL-CCNC: 38 U/L (ref 34–104)
ALT SERPL W P-5'-P-CCNC: 15 U/L (ref 7–52)
ANION GAP SERPL CALCULATED.3IONS-SCNC: 11 MMOL/L (ref 4–13)
ANION GAP SERPL CALCULATED.3IONS-SCNC: 13 MMOL/L (ref 4–13)
AST SERPL W P-5'-P-CCNC: 23 U/L (ref 13–39)
BASOPHILS # BLD AUTO: 0.01 THOUSANDS/ΜL (ref 0–0.1)
BASOPHILS NFR BLD AUTO: 0 % (ref 0–1)
BILIRUB SERPL-MCNC: 0.29 MG/DL (ref 0.2–1)
BNP SERPL-MCNC: 70 PG/ML (ref 0–100)
BUN SERPL-MCNC: 17 MG/DL (ref 5–25)
BUN SERPL-MCNC: 22 MG/DL (ref 5–25)
CALCIUM ALBUM COR SERPL-MCNC: 8.7 MG/DL (ref 8.3–10.1)
CALCIUM SERPL-MCNC: 7.9 MG/DL (ref 8.4–10.2)
CALCIUM SERPL-MCNC: 9.1 MG/DL (ref 8.4–10.2)
CHLORIDE SERPL-SCNC: 103 MMOL/L (ref 96–108)
CHLORIDE SERPL-SCNC: 96 MMOL/L (ref 96–108)
CK SERPL-CCNC: 399 U/L (ref 39–308)
CO2 SERPL-SCNC: 19 MMOL/L (ref 21–32)
CO2 SERPL-SCNC: 23 MMOL/L (ref 21–32)
CREAT SERPL-MCNC: 1.07 MG/DL (ref 0.6–1.3)
CREAT SERPL-MCNC: 1.36 MG/DL (ref 0.6–1.3)
CRP SERPL QL: 189.9 MG/L
D DIMER PPP FEU-MCNC: 1.73 UG/ML FEU
EOSINOPHIL # BLD AUTO: 0 THOUSAND/ΜL (ref 0–0.61)
EOSINOPHIL NFR BLD AUTO: 0 % (ref 0–6)
ERYTHROCYTE [DISTWIDTH] IN BLOOD BY AUTOMATED COUNT: 13.9 % (ref 11.6–15.1)
ERYTHROCYTE [DISTWIDTH] IN BLOOD BY AUTOMATED COUNT: 13.9 % (ref 11.6–15.1)
EST. AVERAGE GLUCOSE BLD GHB EST-MCNC: 232 MG/DL
GFR SERPL CREATININE-BSD FRML MDRD: 49 ML/MIN/1.73SQ M
GFR SERPL CREATININE-BSD FRML MDRD: 66 ML/MIN/1.73SQ M
GLUCOSE SERPL-MCNC: 199 MG/DL (ref 65–140)
GLUCOSE SERPL-MCNC: 205 MG/DL (ref 65–140)
GLUCOSE SERPL-MCNC: 260 MG/DL (ref 65–140)
GLUCOSE SERPL-MCNC: 294 MG/DL (ref 65–140)
GLUCOSE SERPL-MCNC: 296 MG/DL (ref 65–140)
GLUCOSE SERPL-MCNC: 361 MG/DL (ref 65–140)
GLUCOSE SERPL-MCNC: 404 MG/DL (ref 65–140)
HBA1C MFR BLD: 9.7 %
HCT VFR BLD AUTO: 31.7 % (ref 36.5–49.3)
HCT VFR BLD AUTO: 37.8 % (ref 36.5–49.3)
HGB BLD-MCNC: 10.6 G/DL (ref 12–17)
HGB BLD-MCNC: 12.6 G/DL (ref 12–17)
IMM GRANULOCYTES # BLD AUTO: 0.05 THOUSAND/UL (ref 0–0.2)
IMM GRANULOCYTES NFR BLD AUTO: 1 % (ref 0–2)
LYMPHOCYTES # BLD AUTO: 1.31 THOUSANDS/ΜL (ref 0.6–4.47)
LYMPHOCYTES NFR BLD AUTO: 16 % (ref 14–44)
MCH RBC QN AUTO: 30.4 PG (ref 26.8–34.3)
MCH RBC QN AUTO: 30.6 PG (ref 26.8–34.3)
MCHC RBC AUTO-ENTMCNC: 33.3 G/DL (ref 31.4–37.4)
MCHC RBC AUTO-ENTMCNC: 33.4 G/DL (ref 31.4–37.4)
MCV RBC AUTO: 91 FL (ref 82–98)
MCV RBC AUTO: 92 FL (ref 82–98)
MONOCYTES # BLD AUTO: 0.61 THOUSAND/ΜL (ref 0.17–1.22)
MONOCYTES NFR BLD AUTO: 7 % (ref 4–12)
NEUTROPHILS # BLD AUTO: 6.26 THOUSANDS/ΜL (ref 1.85–7.62)
NEUTS SEG NFR BLD AUTO: 76 % (ref 43–75)
NRBC BLD AUTO-RTO: 0 /100 WBCS
PLATELET # BLD AUTO: 141 THOUSANDS/UL (ref 149–390)
PLATELET # BLD AUTO: 165 THOUSANDS/UL (ref 149–390)
PMV BLD AUTO: 9.4 FL (ref 8.9–12.7)
PMV BLD AUTO: 9.6 FL (ref 8.9–12.7)
POTASSIUM SERPL-SCNC: 3.7 MMOL/L (ref 3.5–5.3)
POTASSIUM SERPL-SCNC: 4.3 MMOL/L (ref 3.5–5.3)
PROT SERPL-MCNC: 5.9 G/DL (ref 6.4–8.4)
RBC # BLD AUTO: 3.46 MILLION/UL (ref 3.88–5.62)
RBC # BLD AUTO: 4.14 MILLION/UL (ref 3.88–5.62)
SODIUM SERPL-SCNC: 132 MMOL/L (ref 135–147)
SODIUM SERPL-SCNC: 133 MMOL/L (ref 135–147)
WBC # BLD AUTO: 7.98 THOUSAND/UL (ref 4.31–10.16)
WBC # BLD AUTO: 8.24 THOUSAND/UL (ref 4.31–10.16)

## 2024-09-24 PROCEDURE — 86140 C-REACTIVE PROTEIN: CPT | Performed by: PHYSICIAN ASSISTANT

## 2024-09-24 PROCEDURE — 71045 X-RAY EXAM CHEST 1 VIEW: CPT

## 2024-09-24 PROCEDURE — 82550 ASSAY OF CK (CPK): CPT | Performed by: PHYSICIAN ASSISTANT

## 2024-09-24 PROCEDURE — 82948 REAGENT STRIP/BLOOD GLUCOSE: CPT

## 2024-09-24 PROCEDURE — XW033E5 INTRODUCTION OF REMDESIVIR ANTI-INFECTIVE INTO PERIPHERAL VEIN, PERCUTANEOUS APPROACH, NEW TECHNOLOGY GROUP 5: ICD-10-PCS | Performed by: INTERNAL MEDICINE

## 2024-09-24 PROCEDURE — 80048 BASIC METABOLIC PNL TOTAL CA: CPT | Performed by: FAMILY MEDICINE

## 2024-09-24 PROCEDURE — 8E0ZXY6 ISOLATION: ICD-10-PCS | Performed by: INTERNAL MEDICINE

## 2024-09-24 PROCEDURE — 85025 COMPLETE CBC W/AUTO DIFF WBC: CPT | Performed by: FAMILY MEDICINE

## 2024-09-24 PROCEDURE — 83036 HEMOGLOBIN GLYCOSYLATED A1C: CPT | Performed by: PHYSICIAN ASSISTANT

## 2024-09-24 PROCEDURE — 99285 EMERGENCY DEPT VISIT HI MDM: CPT

## 2024-09-24 PROCEDURE — 94664 DEMO&/EVAL PT USE INHALER: CPT

## 2024-09-24 PROCEDURE — 99285 EMERGENCY DEPT VISIT HI MDM: CPT | Performed by: FAMILY MEDICINE

## 2024-09-24 PROCEDURE — 85027 COMPLETE CBC AUTOMATED: CPT | Performed by: PHYSICIAN ASSISTANT

## 2024-09-24 PROCEDURE — 85379 FIBRIN DEGRADATION QUANT: CPT | Performed by: PHYSICIAN ASSISTANT

## 2024-09-24 PROCEDURE — 36415 COLL VENOUS BLD VENIPUNCTURE: CPT | Performed by: FAMILY MEDICINE

## 2024-09-24 PROCEDURE — 99223 1ST HOSP IP/OBS HIGH 75: CPT | Performed by: INTERNAL MEDICINE

## 2024-09-24 PROCEDURE — 94760 N-INVAS EAR/PLS OXIMETRY 1: CPT

## 2024-09-24 PROCEDURE — 80053 COMPREHEN METABOLIC PANEL: CPT | Performed by: PHYSICIAN ASSISTANT

## 2024-09-24 PROCEDURE — 83880 ASSAY OF NATRIURETIC PEPTIDE: CPT | Performed by: FAMILY MEDICINE

## 2024-09-24 RX ORDER — LISINOPRIL 20 MG/1
20 TABLET ORAL 2 TIMES DAILY
Status: DISCONTINUED | OUTPATIENT
Start: 2024-09-24 | End: 2024-09-26 | Stop reason: HOSPADM

## 2024-09-24 RX ORDER — ALBUTEROL SULFATE 1.25 MG/3ML
1.25 SOLUTION RESPIRATORY (INHALATION) EVERY 6 HOURS PRN
Status: ON HOLD | COMMUNITY

## 2024-09-24 RX ORDER — AMLODIPINE BESYLATE 5 MG/1
5 TABLET ORAL 2 TIMES DAILY
Status: DISCONTINUED | OUTPATIENT
Start: 2024-09-24 | End: 2024-09-26 | Stop reason: HOSPADM

## 2024-09-24 RX ORDER — ACETAMINOPHEN 325 MG/1
650 TABLET ORAL EVERY 6 HOURS PRN
Status: DISCONTINUED | OUTPATIENT
Start: 2024-09-24 | End: 2024-09-26 | Stop reason: HOSPADM

## 2024-09-24 RX ORDER — INSULIN LISPRO 100 [IU]/ML
1-6 INJECTION, SOLUTION INTRAVENOUS; SUBCUTANEOUS
Status: DISCONTINUED | OUTPATIENT
Start: 2024-09-24 | End: 2024-09-26 | Stop reason: HOSPADM

## 2024-09-24 RX ORDER — IPRATROPIUM BROMIDE AND ALBUTEROL SULFATE .5; 3 MG/3ML; MG/3ML
2 SOLUTION RESPIRATORY (INHALATION) ONCE
Status: COMPLETED | OUTPATIENT
Start: 2024-09-24 | End: 2024-09-24

## 2024-09-24 RX ORDER — ONDANSETRON 2 MG/ML
4 INJECTION INTRAMUSCULAR; INTRAVENOUS EVERY 6 HOURS PRN
Status: DISCONTINUED | OUTPATIENT
Start: 2024-09-24 | End: 2024-09-26 | Stop reason: HOSPADM

## 2024-09-24 RX ORDER — ALBUTEROL SULFATE 90 UG/1
2 INHALANT RESPIRATORY (INHALATION) EVERY 4 HOURS PRN
Status: DISCONTINUED | OUTPATIENT
Start: 2024-09-24 | End: 2024-09-25

## 2024-09-24 RX ORDER — HEPARIN SODIUM 5000 [USP'U]/ML
5000 INJECTION, SOLUTION INTRAVENOUS; SUBCUTANEOUS EVERY 8 HOURS SCHEDULED
Status: DISCONTINUED | OUTPATIENT
Start: 2024-09-24 | End: 2024-09-26 | Stop reason: HOSPADM

## 2024-09-24 RX ORDER — BUDESONIDE AND FORMOTEROL FUMARATE DIHYDRATE 160; 4.5 UG/1; UG/1
2 AEROSOL RESPIRATORY (INHALATION) 2 TIMES DAILY
Status: DISCONTINUED | OUTPATIENT
Start: 2024-09-24 | End: 2024-09-26 | Stop reason: HOSPADM

## 2024-09-24 RX ORDER — ATENOLOL 25 MG/1
25 TABLET ORAL DAILY
Status: DISCONTINUED | OUTPATIENT
Start: 2024-09-24 | End: 2024-09-26 | Stop reason: HOSPADM

## 2024-09-24 RX ORDER — FAMOTIDINE 20 MG/1
20 TABLET, FILM COATED ORAL DAILY
Status: DISCONTINUED | OUTPATIENT
Start: 2024-09-24 | End: 2024-09-26 | Stop reason: HOSPADM

## 2024-09-24 RX ORDER — MONTELUKAST SODIUM 10 MG/1
10 TABLET ORAL DAILY
Status: DISCONTINUED | OUTPATIENT
Start: 2024-09-24 | End: 2024-09-26 | Stop reason: HOSPADM

## 2024-09-24 RX ORDER — ACETAMINOPHEN 325 MG/1
975 TABLET ORAL ONCE
Status: COMPLETED | OUTPATIENT
Start: 2024-09-24 | End: 2024-09-24

## 2024-09-24 RX ORDER — METHYLPREDNISOLONE SOD SUCC 125 MG
1 VIAL (EA) INJECTION ONCE
Status: COMPLETED | OUTPATIENT
Start: 2024-09-24 | End: 2024-09-24

## 2024-09-24 RX ORDER — METHYLPREDNISOLONE SODIUM SUCCINATE 40 MG/ML
40 INJECTION, POWDER, LYOPHILIZED, FOR SOLUTION INTRAMUSCULAR; INTRAVENOUS EVERY 24 HOURS
Status: DISCONTINUED | OUTPATIENT
Start: 2024-09-24 | End: 2024-09-25

## 2024-09-24 RX ADMIN — INSULIN LISPRO 4 UNITS: 100 INJECTION, SOLUTION INTRAVENOUS; SUBCUTANEOUS at 21:11

## 2024-09-24 RX ADMIN — LISINOPRIL 20 MG: 20 TABLET ORAL at 21:11

## 2024-09-24 RX ADMIN — ACETAMINOPHEN 975 MG: 325 TABLET ORAL at 01:11

## 2024-09-24 RX ADMIN — HEPARIN SODIUM 5000 UNITS: 5000 INJECTION, SOLUTION INTRAVENOUS; SUBCUTANEOUS at 05:03

## 2024-09-24 RX ADMIN — INSULIN LISPRO 6 UNITS: 100 INJECTION, SOLUTION INTRAVENOUS; SUBCUTANEOUS at 16:37

## 2024-09-24 RX ADMIN — BARICITINIB 2 MG: 2 TABLET, FILM COATED ORAL at 03:02

## 2024-09-24 RX ADMIN — REMDESIVIR 200 MG: 100 INJECTION, POWDER, LYOPHILIZED, FOR SOLUTION INTRAVENOUS at 02:58

## 2024-09-24 RX ADMIN — INSULIN LISPRO 6 UNITS: 100 INJECTION, SOLUTION INTRAVENOUS; SUBCUTANEOUS at 11:44

## 2024-09-24 RX ADMIN — AMLODIPINE BESYLATE 5 MG: 5 TABLET ORAL at 21:11

## 2024-09-24 RX ADMIN — METHYLPREDNISOLONE SODIUM SUCCINATE 40 MG: 40 INJECTION, POWDER, FOR SOLUTION INTRAMUSCULAR; INTRAVENOUS at 03:02

## 2024-09-24 RX ADMIN — SODIUM CHLORIDE 1000 ML: 0.9 INJECTION, SOLUTION INTRAVENOUS at 02:59

## 2024-09-24 RX ADMIN — SODIUM CHLORIDE 1000 ML: 0.9 INJECTION, SOLUTION INTRAVENOUS at 01:26

## 2024-09-24 RX ADMIN — MONTELUKAST 10 MG: 10 TABLET, FILM COATED ORAL at 10:00

## 2024-09-24 RX ADMIN — AMLODIPINE BESYLATE 5 MG: 5 TABLET ORAL at 10:00

## 2024-09-24 RX ADMIN — INSULIN LISPRO 4 UNITS: 100 INJECTION, SOLUTION INTRAVENOUS; SUBCUTANEOUS at 07:30

## 2024-09-24 RX ADMIN — FAMOTIDINE 20 MG: 20 TABLET, FILM COATED ORAL at 10:00

## 2024-09-24 RX ADMIN — ALBUTEROL SULFATE 2 PUFF: 90 AEROSOL, METERED RESPIRATORY (INHALATION) at 10:07

## 2024-09-24 RX ADMIN — HEPARIN SODIUM 5000 UNITS: 5000 INJECTION, SOLUTION INTRAVENOUS; SUBCUTANEOUS at 13:34

## 2024-09-24 RX ADMIN — INSULIN LISPRO 2 UNITS: 100 INJECTION, SOLUTION INTRAVENOUS; SUBCUTANEOUS at 03:05

## 2024-09-24 RX ADMIN — LISINOPRIL 20 MG: 20 TABLET ORAL at 09:59

## 2024-09-24 RX ADMIN — ATENOLOL 25 MG: 25 TABLET ORAL at 09:59

## 2024-09-24 RX ADMIN — HEPARIN SODIUM 5000 UNITS: 5000 INJECTION, SOLUTION INTRAVENOUS; SUBCUTANEOUS at 21:11

## 2024-09-24 NOTE — CASE MANAGEMENT
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 2021     Hyponatremia 2021     Pulmonary nodule 2021     Type 2 diabetes mellitus with complication, without long-term current use of insulin (HCC) 10/23/2017     Essential hypertension 10/23/2017     Asthma 10/23/2017     Hyperlipidemia 10/23/2017       LOS (days): 0  Geometric Mean LOS (GMLOS) (days): 3.3  Days to GMLOS:2.7     OBJECTIVE:    Risk of Unplanned Readmission Score: 20.86         Current admission status: Inpatient  Referral Reason: Other (d/c planning)    Preferred Pharmacy:   [facility] - [place] [id number] [address]  Phone: [phone] Fax: [phone]    [facility] - [place], [place] - [address]  [address] [address]  Phone: [phone] Fax: [phone]    Primary Care Provider: Shayne Diaz MD    Primary Insurance: [facility] RE[facility]  Secondary Insurance:     ASSESSMENT:  Active Health Care Proxies       [facility] Care Representative - Spouse   Primary Phone: [phone] (Mobile)  Home Phone: [phone]                 Advance Directives  Does patient have a Health Care POA?: Yes  Does patient have Advance Directives?: Yes         Readmission Root Cause  30 Day Readmission: No    Patient  Information  Admitted from:: Home  Mental Status: Alert  During Assessment patient was accompanied by: Spouse  Assessment information provided by:: Spouse  Primary Caregiver: Self  Support Systems: Spouse/significant other  County of Residence: [place]  What city do you live in[place]  Home entry access options. Select all that apply.: Stairs  Number of steps to enter home.: One Flight (fron garage 6 steps landing & then 10 steps)  Do the steps have railings?: Yes  Type of Current Residence[place]  Living Arrangements: Lives w/ Spouse/significant other  Is patient a ?: No    Activities of Daily Living Prior to Admission  Functional Status: Independent  Completes ADLs independently?: Yes  Ambulates independently?: Yes  Does patient use assisted devices?: Yes  Assisted Devices (DME) used: Other (Comment), Nebulizer (walking stick)  DME Company Name (respiratory supplies): [employer]  Does patient currently own DME?: Yes  What DME does the patient currently own?: Nebulizer, Other (Comment) (walking stick,pulse ox, glucometer)  Does patient have a history of Outpatient Therapy (PT/OT)?: Yes (pt did go for rehab - pt now goes to the gym)  Does the patient have a history of Short-Term Rehab?: No  Does patient have a history of HHC?: No  Does patient currently have HHC?: No         Patient Information Continued  Income Source: Pension/long term  Does patient have prescription coverage?: Yes (mail order  - [facility] [facility])  Does patient receive dialysis treatments?: No  Does patient have a history of substance abuse?: No  Does patient have a history of Mental Health Diagnosis?: No         Means of Transportation  Means of Transport to Appts:: Drives Self      Social Determinants of Health (SDOH)      Flowsheet Row Most Recent Value   Housing Stability    In the last 12 months, was there a time when you were not able to pay the mortgage or rent on time? N   In the past 12 months, how many times have you  moved where you were living? 0   At any time in the past 12 months, were you homeless or living in a shelter (including now)? N   Transportation Needs    In the past 12 months, has lack of transportation kept you from medical appointments or from getting medications? no   In the past 12 months, has lack of transportation kept you from meetings, work, or from getting things needed for daily living? No   Food Insecurity    Within the past 12 months, you worried that your food would run out before you got the money to buy more. Never true   Within the past 12 months, the food you bought just didn't last and you didn't have money to get more. Never true   Utilities    In the past 12 months has the electric, gas, oil, or water company threatened to shut off services in your home? No            DISCHARGE DETAILS:    Discharge planning discussed with:: wife  Freedom of Choice: Yes  Comments - Freedom of Choice: pt & family deny any d/c needs- cm will continue to follow  CM contacted family/caregiver?: Yes             Contacts  Patient Contacts: Mya - spouse  Relationship to Patient:: Family (wife)  Contact Method: In Person  Reason/Outcome: Discharge Planning    Requested Home Health Care         Is the patient interested in HHC at discharge?: No    DME Referral Provided  Referral made for DME?: No    Other Referral/Resources/Interventions Provided:  Referral Comments: pt not stwboe for d/c - pt does not have home oxygen, IV solumedrol , IV remdesivir    Would you like to participate in our [facility] service program?  : No - Declined    Treatment Team Recommendation: Home (home with wife & outpt follow up- family)                                         Family notified:: wife

## 2024-09-24 NOTE — ASSESSMENT & PLAN NOTE
Admit to medicine  Obtain baseline COVID labs and trend as indicated  Given remdesivir  Patient is requiring 2 L nasal cannula, will give olumiant 2 mg p.o. daily  Will give Solu-Medrol 40 mg IV daily  Continue prehospital respiratory medications  Placed on O2 and respiratory protocol  Begin incentive spirometry  Give antipyretics and antitussives as needed

## 2024-09-24 NOTE — ASSESSMENT & PLAN NOTE
Continue prehospital Norvasc 5 mg p.o. twice daily, atenolol 25 mg p.o. daily and lisinopril 20 mg p.o. twice daily

## 2024-09-24 NOTE — ASSESSMENT & PLAN NOTE
Patient has history of chronic hyponatremia  Mild at 132  We will continue to monitor with repeat labs in a.m.

## 2024-09-24 NOTE — ED PROVIDER NOTES
1. Hypoxia    2. Renal failure    3. SOB (shortness of breath)    4. Fever    5. COVID      ED Disposition       ED Disposition   Admit    Condition   Stable    Date/Time   Tue Sep 24, 2024  1:55 AM    Comment   Case was discussed with Brandon  and the patient's admission status was agreed to be Admission Status: inpatient status to the service of Dr. Sawant .               Assessment & Plan       Medical Decision Making  Amount and/or Complexity of Data Reviewed  Labs: ordered. Decision-making details documented in ED Course.  Radiology: ordered.    Risk  OTC drugs.  Decision regarding hospitalization.    78-year-old male with history of COPD asthma presented with complaint of shortness of breath hypoxia.  Patient states he was diagnosed with COVID on Friday was doing okay until last night when started to feel short of breath as per wife he became slightly altered.  Patient states that he is continue to feel short of breath called EMS patient was noted to be hypoxic with oxygen saturation of 88% on room air placed on 2 L of cannula.  Patient was given Solu-Medrol and DuoNeb prior to arrival.  Temp in the .4  History is taken with patient and EMS and wife was at bedside  Patient diagnoses include COVID-related pneumonia/concerning for infectious causes PE/pericarditis  Plan will obtain labs CBC BMP chest x-ray  Lab reviewed shows a creatinine of 1.37 with tachycardia fever hypoxia concerning for worsening infection will admit  Disposition Case discussed with Brandon CRYSTAL on-call accepted the admission to inpatient.            ED Course as of 09/24/24 0200   Tue Sep 24, 2024   0139 Creatinine(!): 1.36  Renal failure will start IV fluid        Medications   sodium chloride 0.9 % bolus 1,000 mL (1,000 mL Intravenous New Bag 9/24/24 0126)   sodium chloride 0.9 % bolus 1,000 mL (has no administration in time range)   acetaminophen (TYLENOL) tablet 650 mg (has no administration in time range)   ondansetron (ZOFRAN)  injection 4 mg (has no administration in time range)   heparin (porcine) subcutaneous injection 5,000 Units (has no administration in time range)   insulin lispro (HumALOG/ADMELOG) 100 units/mL subcutaneous injection 1-6 Units (has no administration in time range)   insulin lispro (HumALOG/ADMELOG) 100 units/mL subcutaneous injection 1-6 Units (has no administration in time range)   ipratropium-albuterol (FOR EMS ONLY) (DUO-NEB) 0.5-2.5 mg/3 mL inhalation solution 6 mL (0 mL Does not apply Given to EMS 9/24/24 0052)   methylPREDNISolone sodium succinate (FOR EMS ONLY) (Solu-MEDROL) 125 MG injection 125 mg (0 mg Does not apply Given to EMS 9/24/24 0052)   acetaminophen (TYLENOL) tablet 975 mg (975 mg Oral Given 9/24/24 0111)       History of Present Illness         Shortness of Breath  Associated symptoms: cough    Associated symptoms: no abdominal pain, no chest pain, no fever, no headaches, no rash, no sore throat and no vomiting      78-year-old male with history of COPD asthma presented with complaint of shortness of breath hypoxia.  Patient states he was diagnosed with COVID on Friday was doing okay until last night when started to feel short of breath as per wife he became slightly altered.  Patient states that he is continue to feel short of breath called EMS patient was noted to be hypoxic with oxygen saturation of 88% on room air placed on 2 L of cannula.  Patient was given Solu-Medrol and DuoNeb prior to arrival.  Temp in the .4  Review of Systems   Constitutional:  Negative for chills and fever.   HENT:  Negative for rhinorrhea and sore throat.    Eyes:  Negative for visual disturbance.   Respiratory:  Positive for cough and shortness of breath.    Cardiovascular:  Negative for chest pain and leg swelling.   Gastrointestinal:  Negative for abdominal pain, diarrhea, nausea and vomiting.   Genitourinary:  Negative for dysuria.   Musculoskeletal:  Negative for back pain and myalgias.   Skin:  Negative  for rash.   Neurological:  Negative for dizziness and headaches.   Psychiatric/Behavioral:  Negative for confusion.    All other systems reviewed and are negative.          Objective     ED Triage Vitals [09/24/24 0046]   Temperature Pulse Blood Pressure Respirations SpO2 Patient Position - Orthostatic VS   (!) 101.4 °F (38.6 °C) (!) 109 146/69 20 (!) 86 % --      Temp Source Heart Rate Source BP Location FiO2 (%) Pain Score    Oral -- Left arm -- No Pain        Physical Exam  Vitals and nursing note reviewed.   Constitutional:       General: He is not in acute distress.     Appearance: He is well-developed. He is not diaphoretic.   HENT:      Head: Normocephalic and atraumatic.      Right Ear: External ear normal.      Left Ear: External ear normal.      Nose: Nose normal.      Mouth/Throat:      Mouth: Mucous membranes are moist.      Pharynx: Oropharynx is clear. No posterior oropharyngeal erythema.   Eyes:      Conjunctiva/sclera: Conjunctivae normal.      Pupils: Pupils are equal, round, and reactive to light.   Cardiovascular:      Rate and Rhythm: Regular rhythm. Tachycardia present.      Pulses: Normal pulses.      Heart sounds: Normal heart sounds.   Pulmonary:      Effort: Pulmonary effort is normal. No respiratory distress.      Breath sounds: Wheezing present.   Abdominal:      General: Bowel sounds are normal. There is no distension.      Palpations: Abdomen is soft.      Tenderness: There is no abdominal tenderness.   Musculoskeletal:         General: Normal range of motion.      Cervical back: Normal range of motion and neck supple.   Lymphadenopathy:      Cervical: No cervical adenopathy.   Skin:     General: Skin is warm and dry.      Capillary Refill: Capillary refill takes less than 2 seconds.   Neurological:      Mental Status: He is alert and oriented to person, place, and time.   Psychiatric:         Mood and Affect: Mood is anxious.         Behavior: Behavior normal.         Labs Reviewed    CBC AND DIFFERENTIAL - Abnormal       Result Value    WBC 8.24      RBC 4.14      Hemoglobin 12.6      Hematocrit 37.8      MCV 91      MCH 30.4      MCHC 33.3      RDW 13.9      MPV 9.4      Platelets 165      nRBC 0      Segmented % 76 (*)     Immature Grans % 1      Lymphocytes % 16      Monocytes % 7      Eosinophils Relative 0      Basophils Relative 0      Absolute Neutrophils 6.26      Absolute Immature Grans 0.05      Absolute Lymphocytes 1.31      Absolute Monocytes 0.61      Eosinophils Absolute 0.00      Basophils Absolute 0.01     BASIC METABOLIC PANEL - Abnormal    Sodium 132 (*)     Potassium 4.3      Chloride 96      CO2 23      ANION GAP 13      BUN 22      Creatinine 1.36 (*)     Glucose 205 (*)     Calcium 9.1      eGFR 49      Narrative:     [employer] guidelines for Chronic Kidney Disease (CKD):     Stage 1 with normal or high GFR (GFR > 90 mL/min/1.73 square meters)    Stage 2 Mild CKD (GFR = 60-89 mL/min/1.73 square meters)    Stage 3A Moderate CKD (GFR = 45-59 mL/min/1.73 square meters)    Stage 3B Moderate CKD (GFR = 30-44 mL/min/1.73 square meters)    Stage 4 Severe CKD (GFR = 15-29 mL/min/1.73 square meters)    Stage 5 End Stage CKD (GFR <15 mL/min/1.73 square meters)  Note: GFR calculation is accurate only with a steady state creatinine   B-TYPE NATRIURETIC PEPTIDE (BNP) - Normal    BNP 70     HEMOGLOBIN A1C     XR chest 1 view portable    (Results Pending)       Procedures    ED Medication and Procedure Management   Prior to Admission Medications   Prescriptions Last Dose Informant Patient Reported? Taking?   Budeson-Glycopyrrol-Formoterol (Breztri Aerosphere) 160-9-4.8 MCG/ACT AERO   Yes No   Sig: Take 2 puffs by mouth Every 12 hours   LORazepam (ATIVAN) 0.5 mg tablet  Self Yes No   Sig: Take 0.5 mg by mouth daily at bedtime as needed   albuterol (PROVENTIL HFA,VENTOLIN HFA) 90 mcg/act inhaler  Self Yes No   Sig: Ventolin HFA 90 mcg/actuation aerosol inhaler    amLODIPine (NORVASC) 5 mg tablet  Self Yes No   Sig: Take 5 mg by mouth 2 (two) times a day   atenolol (TENORMIN) 25 mg tablet  Self Yes No   Sig: Take 25 mg by mouth daily   benzonatate (TESSALON PERLES) 100 mg capsule   No No   Sig: Take 1 capsule (100 mg total) by mouth 3 (three) times a day as needed for cough   famotidine (PEPCID) 20 mg tablet  Self No No   Sig: Take 1 tablet (20 mg total) by mouth daily   Patient taking differently: Take 20 mg by mouth 2 (two) times a day   glipiZIDE (GLUCOTROL XL) 2.5 mg 24 hr tablet  Self Yes No   Sig: Take 2.5 mg by mouth 2 (two) times a day   lisinopril (ZESTRIL) 20 mg tablet  Self Yes No   Sig: Take 20 mg by mouth 2 (two) times a day   metFORMIN (GLUCOPHAGE) 500 mg tablet  Self Yes No   Sig: Take 500 mg by mouth 2 (two) times a day with meals   montelukast (SINGULAIR) 10 mg tablet  Self Yes No   Sig: Take 10 mg by mouth in the morning   predniSONE 5 mg tablet  Self Yes No   Sig: prednisone 5 mg tablet   Take 1 tablet every day by oral route as needed for 90 days.      Facility-Administered Medications: None     Patient's Medications   Discharge Prescriptions    No medications on file     No discharge procedures on file.     Francois Garzon MD  09/24/24 [id number]

## 2024-09-24 NOTE — Clinical Note
Case was discussed with  and the patient's admission status was agreed to be Admission Status: inpatient status to the service of Dr. Moses

## 2024-09-24 NOTE — ASSESSMENT & PLAN NOTE
Lab Results   Component Value Date    HGBA1C 9.4 (H) 06/26/2024       Recent Labs     09/24/24  0243   POCGLU 199*       Blood Sugar Average: Last 72 hrs:  (P) 199    Placed on [facility] step 2 diet  Hold prehospital oral antihyperglycemic's  10 Accu-Cheks before meals and at bedtime with Humalog correction dose before meals and at bedtime

## 2024-09-24 NOTE — PLAN OF CARE
Problem: Nutrition/Hydration-ADULT  Goal: Nutrient/Hydration intake appropriate for improving, restoring or maintaining nutritional needs  Description: Monitor and assess patient's nutrition/hydration status for malnutrition. Collaborate with interdisciplinary team and initiate plan and interventions as ordered.  Monitor patient's weight and dietary intake as ordered or per policy. Utilize nutrition screening tool and intervene as necessary. Determine patient's food preferences and provide high-protein, high-caloric foods as appropriate.     INTERVENTIONS:  - Monitor oral intake, urinary output, labs, and treatment plans  - Assess nutrition and hydration status and recommend course of action  - Evaluate amount of meals eaten  - Assist patient with eating if necessary   - Allow adequate time for meals  - Recommend/ encourage appropriate diets, oral nutritional supplements, and vitamin/mineral supplements  - Order, calculate, and assess calorie counts as needed  - Assess need for intravenous fluids  - Provide specific nutrition/hydration education as appropriate  - Include patient/family/caregiver in decisions related to nutrition  Outcome: Progressing     Problem: RESPIRATORY - ADULT  Goal: Achieves optimal ventilation and oxygenation  Description: INTERVENTIONS:  - Assess for changes in respiratory status  - Assess for changes in mentation and behavior  - Position to facilitate oxygenation and minimize respiratory effort  - Oxygen administered by appropriate delivery if ordered  - Initiate smoking cessation education as indicated  - Encourage broncho-pulmonary hygiene including cough, deep breathe, Incentive Spirometry  - Assess the need for suctioning and aspirate as needed  - Assess and instruct to report SOB or any respiratory difficulty  - Respiratory Therapy support as indicated  Outcome: Progressing     Problem: INFECTION - ADULT  Goal: Absence or prevention of progression during  hospitalization  Description: INTERVENTIONS:  - Assess and monitor for signs and symptoms of infection  - Monitor lab/diagnostic results  - Monitor all insertion sites, i.e. indwelling lines, tubes, and drains  - Monitor endotracheal if appropriate and nasal secretions for changes in amount and color  - [place] appropriate cooling/warming therapies per order  - Administer medications as ordered  - Instruct and encourage patient and family to use good hand hygiene technique  - Identify and instruct in appropriate isolation precautions for identified infection/condition  Outcome: Progressing     Problem: SAFETY ADULT  Goal: Patient will remain free of falls  Description: INTERVENTIONS:  - Educate patient/family on patient safety including physical limitations  - Instruct patient to call for assistance with activity   - Consult OT/PT to assist with strengthening/mobility   - Keep Call bell within reach  - Keep bed low and locked with side rails adjusted as appropriate  - Keep care items and personal belongings within reach  - Initiate and maintain comfort rounds  - Make Fall Risk Sign visible to staff  - Offer Toileting every 2 Hours, in advance of need  - Initiate/Maintain bed/chair alarm  - Obtain necessary fall risk management equipment: non-slip socks, cane  - Apply yellow socks and bracelet for high fall risk patients  - Consider moving patient to room near nurses station  Outcome: Progressing  Goal: Maintain or return to baseline ADL function  Description: INTERVENTIONS:  -  Assess patient's ability to carry out ADLs; assess patient's baseline for ADL function and identify physical deficits which impact ability to perform ADLs (bathing, care of mouth/teeth, toileting, grooming, dressing, etc.)  - Assess/evaluate cause of self-care deficits   - Assess range of motion  - Assess patient's mobility; develop plan if impaired  - Assess patient's need for assistive devices and provide as appropriate  - Encourage  maximum independence but intervene and supervise when necessary  - Involve family in performance of ADLs  - Assess for home care needs following discharge   - Consider OT consult to assist with ADL evaluation and planning for discharge  - Provide patient education as appropriate  Outcome: Progressing  Goal: Maintains/Returns to pre admission functional level  Description: INTERVENTIONS:  - Perform AM-PAC 6 Click Basic Mobility/ Daily Activity assessment daily.  - Set and communicate daily mobility goal to care team and patient/family/caregiver.   - Collaborate with rehabilitation services on mobility goals if consulted  - Perform Range of Motion 3 times a day.  - Ambulate patient 3 times a day  - Out of bed to chair 3 times a day for meals  - Out of bed for toileting  - Record patient progress and toleration of activity level   Outcome: Progressing     Problem: DISCHARGE PLANNING  Goal: Discharge to home or other facility with appropriate resources  Description: INTERVENTIONS:  - Identify barriers to discharge w/patient and caregiver  - Arrange for needed discharge resources and transportation as appropriate  - Identify discharge learning needs (meds, wound care, etc.)  - Arrange for interpretive services to assist at discharge as needed  - Refer to Case Management Department for coordinating discharge planning if the patient needs post-hospital services based on physician/advanced practitioner order or complex needs related to functional status, cognitive ability, or social support system  Outcome: Progressing     Problem: Knowledge Deficit  Goal: Patient/family/caregiver demonstrates understanding of disease process, treatment plan, medications, and discharge instructions  Description: Complete learning assessment and assess knowledge base.  Interventions:  - Provide teaching at level of understanding  - Provide teaching via preferred learning methods  Outcome: Progressing

## 2024-09-24 NOTE — H&P
H&P - Hospitalist   Name: Gold Van 78 y.o. male I MRN: [id number]  Unit/Bed#: -01 I Date of Admission: 9/24/2024   Date of Service: 9/24/2024 I Hospital Day: 0     Assessment & Plan  COVID-19  Admit to medicine  Obtain baseline COVID labs and trend as indicated  Given remdesivir  Patient is requiring 2 L nasal cannula, will give olumiant 2 mg p.o. daily  Will give Solu-Medrol 40 mg IV daily  Continue prehospital respiratory medications  Placed on O2 and respiratory protocol  Begin incentive spirometry  Give antipyretics and antitussives as needed  Type 2 diabetes mellitus with complication, without long-term current use of insulin ([facility])  Lab Results   Component Value Date    HGBA1C 9.4 (H) 06/26/2024       Recent Labs     09/24/24  0243   POCGLU 199*       Blood Sugar Average: Last 72 hrs:  (P) 199    Placed on [facility] step 2 diet  Hold prehospital oral antihyperglycemic's  10 Accu-Cheks before meals and at bedtime with Humalog correction dose before meals and at bedtime  Essential hypertension  Continue prehospital Norvasc 5 mg p.o. twice daily, atenolol 25 mg p.o. daily and lisinopril 20 mg p.o. twice daily  Hyponatremia  Patient has history of chronic hyponatremia  Mild at 132  We will continue to monitor with repeat labs in a.m.  Acute respiratory insufficiency  Likely multifactorial given his COVID and history of COPD  Patient was hypoxic with O2 sats as low as 86% on room air  Being treated as per above    VTE Prophylaxis: Heparin  Code Status: Level 1  Discussion with family: None present at bedside at time of exam    Anticipated Length of Stay:  Patient will be admitted on an Inpatient basis with an anticipated length of stay of  > 2 midnights.   Justification for Hospital Stay: COVID-19 with acute respiratory insufficiency requiring O2 support, IV steroids and remdesivir    Total Time for Visit, including Counseling / Coordination of Care: 1 hour.  Greater than 50% of this total time spent on direct  patient counseling and coordination of care.    Chief Complaint:   Shortness of breath x 3 days    History of Present Illness:    Gold Van is a 78 y.o. male who presents with shortness of breath x 3 days.  Patient with a history of COPD [facility] for further evaluation treatment of his 3-day history of increasing shortness of breath to the point of dyspnea with minimal activity.  Patient states that he tested positive for COVID at home 4 days ago and since then has had some low-grade fever denies any muscle aches or bodyaches but does complain of a cough which is nonproductive.  Patient states that he is fully vaccinated and was due to receive his booster vaccine next week.  Patient states that he has been dyspneic with minimal activity this has been unresponsive despite his use of his nebulizer as well as MDI multiple times at home.  Upon arrival in ER patient was noted to be hypoxic with an O2 saturation 86% and patient does not wear home O2.  Patient states that his wife is home with similar symptoms and also tested positive for COVID on the same day.    Review of Systems:    Review of Systems   Constitutional:  Negative for chills and fever.   Respiratory:  Positive for cough and shortness of breath. Negative for wheezing.    Cardiovascular:  Negative for chest pain and palpitations.   Gastrointestinal:  Negative for diarrhea, nausea and vomiting.   Genitourinary:  Negative for dysuria, frequency, hematuria and urgency.   Neurological:  Negative for weakness, light-headedness and headaches.   All other systems reviewed and are negative.      Past Medical and Surgical History:     Past Medical History:   Diagnosis Date    Asthma     Diabetes mellitus (HCC)     Environmental allergies     Hyperlipidemia     Hypertension     Macular degeneration 07/13/2020    right eye    Orthostatic hypotension     Osteopenia     Pneumothorax     Sinusitis        Past Surgical History:   Procedure Laterality Date     COLONOSCOPY      KNEE CARTILAGE SURGERY Right 1964    VASECTOMY      WISDOM TOOTH EXTRACTION      x4       Meds/Allergies:    Prior to Admission medications    Medication Sig Start Date End Date Taking? Authorizing Provider   albuterol (ACCUNEB) 1.25 MG/3ML nebulizer solution Take 1.25 mg by nebulization every 6 (six) hours as needed for wheezing   Yes Historical Provider, MD   albuterol (PROVENTIL HFA,VENTOLIN HFA) 90 mcg/act inhaler Ventolin HFA 90 mcg/actuation aerosol inhaler   Yes Historical Provider, MD   amLODIPine (NORVASC) 5 mg tablet Take 5 mg by mouth 2 (two) times a day   Yes Historical Provider, MD   atenolol (TENORMIN) 25 mg tablet Take 25 mg by mouth daily   Yes Historical Provider, MD   Budeson-Glycopyrrol-Formoterol (Breztri Aerosphere) 160-9-4.8 MCG/ACT AERO Take 2 puffs by mouth Every 12 hours   Yes Historical Provider, MD   famotidine (PEPCID) 20 mg tablet Take 1 tablet (20 mg total) by mouth daily  Patient taking differently: Take 20 mg by mouth 2 (two) times a day 3/26/21  Yes Mendy Watson DO   glipiZIDE (GLUCOTROL XL) 2.5 mg 24 hr tablet Take 2.5 mg by mouth 2 (two) times a day   Yes Historical Provider, MD   ipratropium (ATROVENT) 0.02 % nebulizer solution Take 0.5 mg by nebulization 4 (four) times a day   Yes Historical Provider, MD   lisinopril (ZESTRIL) 20 mg tablet Take 20 mg by mouth 2 (two) times a day   Yes Historical Provider, MD   metFORMIN (GLUCOPHAGE) 500 mg tablet Take 500 mg by mouth 2 (two) times a day with meals   Yes Historical Provider, MD   montelukast (SINGULAIR) 10 mg tablet Take 10 mg by mouth in the morning   Yes Historical Provider, MD   predniSONE 5 mg tablet Take 5 mg by mouth daily ALTERNATES 5MG AND 15MG EOD   Yes Historical Provider, MD   benzonatate (TESSALON PERLES) 100 mg capsule Take 1 capsule (100 mg total) by mouth 3 (three) times a day as needed for cough 4/30/24   Cora Sawant MD   LORazepam (ATIVAN) 0.5 mg tablet Take 0.5 mg by mouth daily at  bedtime as needed    Historical Provider, MD     all medications and allergies reviewed    Allergies:   Allergies   Allergen Reactions    Ciprofloxacin Shortness Of Breath    Sulfamethoxazole Shortness Of Breath    Trimethoprim Shortness Of Breath    Dexamethasone Other (See Comments)    Triamcinolone Other (See Comments)    Bactrim [Sulfamethoxazole-Trimethoprim] Fever     Fever of 107       Social History:     Marital Status: /Civil Union   Occupation: Retired  Patient Pre-hospital Living Situation: Resides at home with wife  Patient Pre-hospital Level of Mobility: Full with occasional assist of a cane  Patient Pre-hospital Diet Restrictions: None    Social History     Substance and Sexual Activity   Alcohol Use Never     Social History     Tobacco Use   Smoking Status Former   Smokeless Tobacco Never   Tobacco Comments    quit about 25 years ago     Social History     Substance and Sexual Activity   Drug Use Never       Family History:  I have reviewed the patient's family history    Physical Exam:     Vitals:   Blood Pressure: 141/69 (09/24/24 0230)  Pulse: 103 (09/24/24 0230)  Temperature: 99.1 °F (37.3 °C) (09/24/24 0230)  Temp Source: Oral (09/24/24 0046)  Respirations: 20 (09/24/24 0230)  Height: 6' (182.9 cm) (09/24/24 0225)  Weight - Scale: 76.6 kg (168 lb 14 oz) (09/24/24 0225)  SpO2: (!) 89 % (09/24/24 0230)    Physical Exam  Vitals and nursing note reviewed.   Constitutional:       Appearance: He is well-developed. He is ill-appearing.   HENT:      Head: Normocephalic and atraumatic.      Right Ear: Tympanic membrane normal.      Left Ear: Tympanic membrane normal.      Nose: Nose normal.      Mouth/Throat:      Mouth: Mucous membranes are moist.      Pharynx: No oropharyngeal exudate.   Eyes:      General: No scleral icterus.     Pupils: Pupils are equal, round, and reactive to light.   Neck:      Vascular: No JVD.   Cardiovascular:      Rate and Rhythm: Normal rate and regular rhythm.       Heart sounds: Normal heart sounds. No murmur heard.  Pulmonary:      Effort: Pulmonary effort is normal. No respiratory distress.      Breath sounds: Normal breath sounds. No wheezing or rales.   Abdominal:      General: Bowel sounds are normal.      Palpations: Abdomen is soft.      Tenderness: There is no abdominal tenderness. There is no guarding or rebound.   Musculoskeletal:         General: Normal range of motion.      Cervical back: Normal range of motion and neck supple.   Lymphadenopathy:      Cervical: No cervical adenopathy.   Skin:     General: Skin is warm and dry.      Findings: No erythema or rash.   Neurological:      Mental Status: He is alert and oriented to person, place, and time.   Psychiatric:         Behavior: Behavior normal.         Additional Data:     Lab Results: Reviewed radiology reports from this admission including: chest xray.    Results from last 7 days   Lab Units 09/24/24  0450 09/24/24  0110   WBC Thousand/uL 7.98 8.24   HEMOGLOBIN g/dL 10.6* 12.6   HEMATOCRIT % 31.7* 37.8   PLATELETS Thousands/uL 141* 165   SEGS PCT %  --  76*   LYMPHO PCT %  --  16   MONO PCT %  --  7   EOS PCT %  --  0     Results from last 7 days   Lab Units 09/24/24  0110   SODIUM mmol/L 132*   POTASSIUM mmol/L 4.3   CHLORIDE mmol/L 96   CO2 mmol/L 23   BUN mg/dL 22   CREATININE mg/dL 1.36*   ANION GAP mmol/L 13   CALCIUM mg/dL 9.1   GLUCOSE RANDOM mg/dL 205*         Results from last 7 days   Lab Units 09/24/24  0243   POC GLUCOSE mg/dl 199*               Imaging: Reviewed radiology reports from this admission including: chest xray.  XR chest 1 view portable    (Results Pending)         EKG, Pathology, and Other Studies Reviewed on Admission:   EKG: N/A    [facility] / Care Everywhere Records Reviewed: Yes    ** Please Note: This note has been constructed using a voice recognition system. **   130

## 2024-09-24 NOTE — ASSESSMENT & PLAN NOTE
Likely multifactorial given his COVID and history of COPD  Patient was hypoxic with O2 sats as low as 86% on room air  Being treated as per above

## 2024-09-25 LAB
ANION GAP SERPL CALCULATED.3IONS-SCNC: 11 MMOL/L (ref 4–13)
BUN SERPL-MCNC: 22 MG/DL (ref 5–25)
CALCIUM SERPL-MCNC: 8.4 MG/DL (ref 8.4–10.2)
CHLORIDE SERPL-SCNC: 102 MMOL/L (ref 96–108)
CO2 SERPL-SCNC: 21 MMOL/L (ref 21–32)
CREAT SERPL-MCNC: 0.85 MG/DL (ref 0.6–1.3)
ERYTHROCYTE [DISTWIDTH] IN BLOOD BY AUTOMATED COUNT: 13.6 % (ref 11.6–15.1)
GFR SERPL CREATININE-BSD FRML MDRD: 83 ML/MIN/1.73SQ M
GLUCOSE SERPL-MCNC: 230 MG/DL (ref 65–140)
GLUCOSE SERPL-MCNC: 249 MG/DL (ref 65–140)
GLUCOSE SERPL-MCNC: 300 MG/DL (ref 65–140)
GLUCOSE SERPL-MCNC: 357 MG/DL (ref 65–140)
GLUCOSE SERPL-MCNC: 361 MG/DL (ref 65–140)
HCT VFR BLD AUTO: 33.3 % (ref 36.5–49.3)
HGB BLD-MCNC: 11.2 G/DL (ref 12–17)
MCH RBC QN AUTO: 30.7 PG (ref 26.8–34.3)
MCHC RBC AUTO-ENTMCNC: 33.6 G/DL (ref 31.4–37.4)
MCV RBC AUTO: 91 FL (ref 82–98)
PLATELET # BLD AUTO: 159 THOUSANDS/UL (ref 149–390)
PMV BLD AUTO: 9.4 FL (ref 8.9–12.7)
POTASSIUM SERPL-SCNC: 4 MMOL/L (ref 3.5–5.3)
PROCALCITONIN SERPL-MCNC: 1.84 NG/ML
RBC # BLD AUTO: 3.65 MILLION/UL (ref 3.88–5.62)
SODIUM SERPL-SCNC: 134 MMOL/L (ref 135–147)
WBC # BLD AUTO: 9.02 THOUSAND/UL (ref 4.31–10.16)

## 2024-09-25 PROCEDURE — 99232 SBSQ HOSP IP/OBS MODERATE 35: CPT | Performed by: INTERNAL MEDICINE

## 2024-09-25 PROCEDURE — 82948 REAGENT STRIP/BLOOD GLUCOSE: CPT

## 2024-09-25 PROCEDURE — 84145 PROCALCITONIN (PCT): CPT | Performed by: PHYSICIAN ASSISTANT

## 2024-09-25 PROCEDURE — 85027 COMPLETE CBC AUTOMATED: CPT | Performed by: INTERNAL MEDICINE

## 2024-09-25 PROCEDURE — 80048 BASIC METABOLIC PNL TOTAL CA: CPT | Performed by: INTERNAL MEDICINE

## 2024-09-25 PROCEDURE — 94760 N-INVAS EAR/PLS OXIMETRY 1: CPT

## 2024-09-25 RX ORDER — ALBUTEROL SULFATE 0.83 MG/ML
2.5 SOLUTION RESPIRATORY (INHALATION) ONCE
Status: DISCONTINUED | OUTPATIENT
Start: 2024-09-25 | End: 2024-09-26

## 2024-09-25 RX ORDER — LEVALBUTEROL INHALATION SOLUTION 0.63 MG/3ML
0.63 SOLUTION RESPIRATORY (INHALATION)
Status: DISCONTINUED | OUTPATIENT
Start: 2024-09-25 | End: 2024-09-25

## 2024-09-25 RX ORDER — ALBUTEROL SULFATE 90 UG/1
2 INHALANT RESPIRATORY (INHALATION) 4 TIMES DAILY
Status: DISCONTINUED | OUTPATIENT
Start: 2024-09-25 | End: 2024-09-26 | Stop reason: HOSPADM

## 2024-09-25 RX ORDER — METHYLPREDNISOLONE SODIUM SUCCINATE 40 MG/ML
40 INJECTION, POWDER, LYOPHILIZED, FOR SOLUTION INTRAMUSCULAR; INTRAVENOUS EVERY 12 HOURS SCHEDULED
Status: DISCONTINUED | OUTPATIENT
Start: 2024-09-25 | End: 2024-09-26 | Stop reason: HOSPADM

## 2024-09-25 RX ADMIN — HEPARIN SODIUM 5000 UNITS: 5000 INJECTION, SOLUTION INTRAVENOUS; SUBCUTANEOUS at 14:05

## 2024-09-25 RX ADMIN — ALBUTEROL SULFATE 2 PUFF: 90 AEROSOL, METERED RESPIRATORY (INHALATION) at 17:00

## 2024-09-25 RX ADMIN — INSULIN LISPRO 6 UNITS: 100 INJECTION, SOLUTION INTRAVENOUS; SUBCUTANEOUS at 11:57

## 2024-09-25 RX ADMIN — LISINOPRIL 20 MG: 20 TABLET ORAL at 08:06

## 2024-09-25 RX ADMIN — MONTELUKAST 10 MG: 10 TABLET, FILM COATED ORAL at 08:06

## 2024-09-25 RX ADMIN — METHYLPREDNISOLONE SODIUM SUCCINATE 40 MG: 40 INJECTION, POWDER, FOR SOLUTION INTRAMUSCULAR; INTRAVENOUS at 21:16

## 2024-09-25 RX ADMIN — BUDESONIDE AND FORMOTEROL FUMARATE DIHYDRATE 2 PUFF: 160; 4.5 AEROSOL RESPIRATORY (INHALATION) at 08:19

## 2024-09-25 RX ADMIN — ALBUTEROL SULFATE 2 PUFF: 90 AEROSOL, METERED RESPIRATORY (INHALATION) at 02:26

## 2024-09-25 RX ADMIN — INSULIN LISPRO 6 UNITS: 100 INJECTION, SOLUTION INTRAVENOUS; SUBCUTANEOUS at 16:51

## 2024-09-25 RX ADMIN — REMDESIVIR 100 MG: 100 INJECTION, POWDER, LYOPHILIZED, FOR SOLUTION INTRAVENOUS at 02:26

## 2024-09-25 RX ADMIN — ALBUTEROL SULFATE 2 PUFF: 90 AEROSOL, METERED RESPIRATORY (INHALATION) at 08:08

## 2024-09-25 RX ADMIN — LISINOPRIL 20 MG: 20 TABLET ORAL at 21:17

## 2024-09-25 RX ADMIN — BARICITINIB 2 MG: 2 TABLET, FILM COATED ORAL at 02:26

## 2024-09-25 RX ADMIN — ALBUTEROL SULFATE 2 PUFF: 90 AEROSOL, METERED RESPIRATORY (INHALATION) at 11:57

## 2024-09-25 RX ADMIN — INSULIN LISPRO 3 UNITS: 100 INJECTION, SOLUTION INTRAVENOUS; SUBCUTANEOUS at 08:03

## 2024-09-25 RX ADMIN — ALBUTEROL SULFATE 2 PUFF: 90 AEROSOL, METERED RESPIRATORY (INHALATION) at 21:16

## 2024-09-25 RX ADMIN — HEPARIN SODIUM 5000 UNITS: 5000 INJECTION, SOLUTION INTRAVENOUS; SUBCUTANEOUS at 21:16

## 2024-09-25 RX ADMIN — FAMOTIDINE 20 MG: 20 TABLET, FILM COATED ORAL at 08:06

## 2024-09-25 RX ADMIN — AMLODIPINE BESYLATE 5 MG: 5 TABLET ORAL at 08:06

## 2024-09-25 RX ADMIN — ATENOLOL 25 MG: 25 TABLET ORAL at 08:06

## 2024-09-25 RX ADMIN — INSULIN LISPRO 4 UNITS: 100 INJECTION, SOLUTION INTRAVENOUS; SUBCUTANEOUS at 21:16

## 2024-09-25 RX ADMIN — HEPARIN SODIUM 5000 UNITS: 5000 INJECTION, SOLUTION INTRAVENOUS; SUBCUTANEOUS at 05:01

## 2024-09-25 RX ADMIN — BUDESONIDE AND FORMOTEROL FUMARATE DIHYDRATE 2 PUFF: 160; 4.5 AEROSOL RESPIRATORY (INHALATION) at 17:00

## 2024-09-25 RX ADMIN — METHYLPREDNISOLONE SODIUM SUCCINATE 40 MG: 40 INJECTION, POWDER, FOR SOLUTION INTRAMUSCULAR; INTRAVENOUS at 02:26

## 2024-09-25 RX ADMIN — AMLODIPINE BESYLATE 5 MG: 5 TABLET ORAL at 21:17

## 2024-09-25 NOTE — ASSESSMENT & PLAN NOTE
Lab Results   Component Value Date    HGBA1C 9.7 (H) 09/24/2024       Recent Labs     09/24/24  1122 09/24/24  1628 09/24/24 2058 09/25/24  0739   POCGLU 361* 404* 296* 249*       Blood Sugar Average: Last 72 hrs:  (P) 300.5    Placed on [facility] step 2 diet  Hold prehospital oral antihyperglycemic's  Accu-Cheks before meals and at bedtime with Humalog correction dose before meals and at bedtime

## 2024-09-25 NOTE — CASE MANAGEMENT
Case Management Discharge Planning Note    Patient name Gold Van  Location /-01 MRN [id number]  : 1945 Date 2024       Current Admission Date: 2024  Current Admission Diagnosis:COVID-19   Patient Active Problem List    Diagnosis Date Noted Date Diagnosed    COVID-19 2024     Acute respiratory insufficiency 2024     Shortness of breath 2024     COPD with asthma 2024     History of pneumothorax 2024     Non-recurrent bilateral inguinal hernia without obstruction or gangrene 2024     Pruritus 2024     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 2021     Hyponatremia 2021     Pulmonary nodule 2021     Type 2 diabetes mellitus with complication, without long-term current use of insulin (HCC) 10/23/2017     Essential hypertension 10/23/2017     Asthma 10/23/2017     Hyperlipidemia 10/23/2017       LOS (days): 1  Geometric Mean LOS (GMLOS) (days): 5  Days to GMLOS:3.3     OBJECTIVE:  Risk of Unplanned Readmission Score: 17.4         Current admission status: Inpatient   Preferred Pharmacy:   [facility] - [facility] [address] [address]  Phone: [phone] Fax: [phone]    [facility] - [place], [place] - [address]  [address] [facility] [address]  Phone: [phone] Fax: [phone]    Primary Care Provider: Shayne Diaz MD    Primary Insurance: [employer] [facility]  Secondary Insurance:     DISCHARGE DETAILS:    Discharge planning discussed with:: patient and wife was called at 17:47 pm  Freedom of Choice: Yes  Comments - Freedom of Choice: pt & family deny any d/c needs  CM contacted family/caregiver?: Yes             Contacts  Patient Contacts: Mya - spouse  Relationship to Patient:: Family  Contact Method: Phone  Phone Number: [phone]( home)  Reason/Outcome: Discharge Planning         DME Referral  Provided  Referral made for DME?: No    Other Referral/Resources/Interventions Provided:  Referral Comments: pt'w jose states she has a walker for the pt-  pt not stable for d/c today- pt remains on IV remdesivir & Iv soulmendrol    Would you like to participate in our [facility] service program?  : No - Declined    Treatment Team Recommendation: Home (home with wife & outpt follow up- wife)                                         Family notified:: wife was called

## 2024-09-25 NOTE — UTILIZATION REVIEW
Initial Clinical Review    Admission: Date/Time/Statement:   Admission Orders (From admission, onward)       Ordered        09/24/24 0156  INPATIENT ADMISSION  Once            09/24/24 0157  Inpatient Admission  Once                          Orders Placed This Encounter   Procedures    INPATIENT ADMISSION     Standing Status:   Standing     Number of Occurrences:   1     Order Specific Question:   Level of Care     Answer:   Med Surg [16]     Order Specific Question:   Estimated length of stay     Answer:   More than 2 Midnights     Order Specific Question:   Certification     Answer:   I certify that inpatient services are medically necessary for this patient for a duration of greater than two midnights. See H&P and MD Progress Notes for additional information about the patient's course of treatment.    Inpatient Admission     Standing Status:   Standing     Number of Occurrences:   1     Order Specific Question:   Level of Care     Answer:   Med Surg [16]     Order Specific Question:   Estimated length of stay     Answer:   More than 2 Midnights     Order Specific Question:   Certification     Answer:   I certify that inpatient services are medically necessary for this patient for a duration of greater than two midnights. See H&P and MD Progress Notes for additional information about the patient's course of treatment.     ED Arrival Information       Expected   -    Arrival   9/24/2024 00:41    Acuity   Emergent              Means of arrival   Ambulance    Escorted by   [facility]    Service   Hospitalist    Admission type   Emergency              Arrival complaint   sob             Chief Complaint   Patient presents with    Shortness of Breath     Pt presents with SOB since Friday. Pt is Dx with covid        Initial Presentation: 78 y.o. male who presented by EMS to [facility] ED. Admitted as Inpatient for evaluation and treatment of Covid infection, Acute hypoxic resp failure.      PMHx:  has a  past medical history of Asthma, Diabetes mellitus (HCC), Environmental allergies, Hyperlipidemia, Hypertension, Macular degeneration (07/13/2020), Orthostatic hypotension, Osteopenia, Pneumothorax, and Sinusitis.      Presented w/ 3-day history of increasing shortness of breath to the point of dyspnea with minimal activity. Patient states that he tested positive for COVID at home 4 days ago and since then has had some low-grade fever denies any muscle aches or bodyaches but does complain of a cough which is nonproductive. Pt reports he has been dyspneic w/ minimal activity despite use of his nebulizer as well as MDI multiple times at home. On exam, pt was hypoxic 86% RA, O2 2 L placed and saturating 95%. Does not wear O2 at home. Wheezing present and pt anxious. Abnormal Labs Na 132, Creat 1.36, Glucose 205,  Hgb A1c 9.7, Total , .9, D-dimer 1.73. In the ED, pt received IV solu-medrol x1 and IV NSS 1L bolus.     Plan: Continue Remdesivir and Baricitinib. Solu-Medrol 40 mg IV daily. Wean O2 as able,Continue PTA respiratory meds.  Incentive spirometry. Give antipyretics and antitussives as needed. [facility] step 2 diet. Hold prehospital oral antihyperglycemics. Accu-check before meals and at bedtime w/ Humalog correction dose before meals and at bedtime. BMP and CBC in am.     Anticipated Length of Stay/Certification Statement: Patient will be admitted on an Inpatient basis with an anticipated length of stay of  > 2 midnights.   Justification for Hospital Stay: COVID-19 with acute respiratory insufficiency requiring O2 support, IV steroids and remdesivir     Date: 9/25/24   Day 2: No events or complaints over night. On exam, decreased breath sounds noted. Weaned off O2. Abnormal labs: Na 134, Glucose 230, Procal 1.84 H/H 11.2/33.3. Plan: Increase Solu-medrol to 40 mg BID, Continue Remdesivir and Baricitinib. Continue pre hospital resp meds. Incentive spirometry, Antipyretics and antitussives as needed. [facility] step  2 diet. Hold prehospital oral antihyperglycemics. Accu-check before meals and at bedtime w/ Humalog correction dose before meals and at bedtime.     Certification Statement: The patient will continue to require additional inpatient hospital stay due to COVID infection     Date: 9/26/24  Day 3: Has surpassed a 2nd midnight with active treatments and services.   Pt reports feeling better today. Currently saturating well off oxygen. Pt stable for discharge home today. Pt discharge on prednisone 40 mg daily x 3 days and then advised to resume his home prednisone dosing.         ED Triage Vitals [09/24/24 0046]   Temperature Pulse Respirations Blood Pressure SpO2 Pain Score   (!) 101.4 °F (38.6 °C) (!) 109 20 146/69 (!) 86 % No Pain     Weight (last 2 days)       Date/Time Weight    09/24/24 0225 76.6 (168.87)            Vital Signs (last 3 days)       Date/Time Temp Pulse Resp BP MAP (mmHg) SpO2 Calculated FIO2 (%) - Nasal Cannula Nasal Cannula O2 Flow Rate (L/min) O2 Device Patient Position - Orthostatic VS [place] Coma Scale Score Pain    09/26/24 07:51:54 97.7 °F (36.5 °C) 55 14 140/76 97 92 % -- -- -- -- -- --    09/26/24 0240 -- -- -- -- -- 95 % -- -- None (Room air) -- -- --    09/25/24 22:43:47 97.9 °F (36.6 °C) 82 20 137/77 97 95 % -- -- -- -- -- --    09/25/24 2117 -- -- -- 140/80 -- -- -- -- -- -- -- --    09/25/24 2115 -- -- -- -- -- -- -- -- None (Room air) -- 15 No Pain    09/25/24 1541 -- -- -- -- -- 93 % -- -- -- -- -- --    09/25/24 1540 -- -- -- -- -- 91 % -- -- -- -- -- --    09/25/24 1539 -- -- -- -- -- 93 % -- -- -- -- -- --    09/25/24 1538 -- -- -- -- -- 92 % -- -- -- -- -- --    09/25/24 1537 -- -- -- -- -- 92 % -- -- -- -- -- --    09/25/24 14:31:11 97.5 °F (36.4 °C) 78 -- 124/63 83 91 % -- -- -- -- -- --    09/25/24 14:30:53 97.5 °F (36.4 °C) 77 18 124/63 83 90 % -- -- -- -- -- --    09/25/24 07:40:50 -- 76 -- 134/75 95 90 % -- -- -- -- 15 No Pain    09/25/24 0729 97.6 °F (36.4 °C) -- 18 -- --  90 % -- -- None (Room air) -- -- --    09/24/24 22:41:01 97.6 °F (36.4 °C) 77 18 116/67 83 92 % -- -- None (Room air) -- 15 No Pain    09/24/24 2111 -- -- -- 139/69 -- -- -- -- -- -- -- --    09/24/24 15:26:54 97.7 °F (36.5 °C) -- 16 121/69 86 -- -- -- -- -- -- --    09/24/24 1014 -- -- -- -- -- 93 % -- -- None (Room air) -- -- --    09/24/24 0959 -- 74 -- 113/60 -- -- -- -- -- -- -- --    09/24/24 0733 -- -- -- -- -- 94 % 24 1 L/min Nasal cannula -- 15 No Pain    09/24/24 07:14:33 97.6 °F (36.4 °C) 75 18 111/61 78 95 % 28 2 L/min Nasal cannula Lying -- --    09/24/24 0618 -- -- 18 -- -- -- 28 2 L/min Nasal cannula -- -- --    09/24/24 0308 -- -- -- -- -- -- -- -- -- -- 15 No Pain    09/24/24 0231 -- -- -- -- -- -- -- -- -- -- -- No Pain    09/24/24 02:30:15 99.1 °F (37.3 °C) 103 20 141/69 93 89 % -- -- -- -- -- --    09/24/24 0128 -- -- -- -- -- -- -- -- -- -- 15 --    09/24/24 0112 -- -- -- -- -- -- -- -- None (Room air) -- -- --    09/24/24 0058 -- 112 -- 146/69 99 88 % -- -- -- -- -- --    09/24/24 0046 101.4 °F (38.6 °C) 109 20 146/69 -- 86 % -- -- None (Room air) -- -- No Pain              Pertinent Labs/Diagnostic Test Results:   Radiology:  XR chest 1 view portable   Final Interpretation by Rodolfo Lennon MD (09/24 0741)      No radiographic evidence of acute intrathoracic process.            Workstation performed: [id number]                 Results from last 7 days   Lab Units 09/26/24  0443 09/25/24  0457 09/24/24  0450 09/24/24  0110   WBC Thousand/uL 9.26 9.02 7.98 8.24   HEMOGLOBIN g/dL 11.7* 11.2* 10.6* 12.6   HEMATOCRIT % 35.7* 33.3* 31.7* 37.8   PLATELETS Thousands/uL 189 159 141* 165   TOTAL NEUT ABS Thousands/µL  --   --   --  6.26         Results from last 7 days   Lab Units 09/26/24  0443 09/25/24  0457 09/24/24  0629 09/24/24  0110   SODIUM mmol/L 132* 134* 133* 132*   POTASSIUM mmol/L 4.3 4.0 3.7 4.3   CHLORIDE mmol/L 100 102 103 96   CO2 mmol/L 21 21 19* 23   ANION GAP mmol/L 11  11 11 13   BUN mg/dL 21 22 17 22   CREATININE mg/dL 0.86 0.85 1.07 1.36*   EGFR ml/min/1.73sq m 83 83 66 49   CALCIUM mg/dL 8.2* 8.4 7.9* 9.1     Results from last 7 days   Lab Units 09/24/24  0629   AST U/L 23   ALT U/L 15   ALK PHOS U/L 38   TOTAL PROTEIN g/dL 5.9*   ALBUMIN g/dL 3.0*   TOTAL BILIRUBIN mg/dL 0.29     Results from last 7 days   Lab Units 09/26/24  0749 09/25/24  2113 09/25/24  1640 09/25/24  1050 09/25/24  0739 09/24/24  2058 09/24/24  1628 09/24/24  1122 09/24/24  0721 09/24/24  0243   POC GLUCOSE mg/dl 343* 300* 361* 357* 249* 296* 404* 361* 294* 199*     Results from last 7 days   Lab Units 09/26/24  0443 09/25/24  0457 09/24/24  0629 09/24/24  0110   GLUCOSE RANDOM mg/dL 320* 230* 260* 205*         Results from last 7 days   Lab Units 09/24/24  0450   HEMOGLOBIN A1C % 9.7*   EAG mg/dl 232         Results from last 7 days   Lab Units 09/26/24  0443 09/24/24  0110   CK TOTAL U/L 292 399*         Results from last 7 days   Lab Units 09/24/24  0450   D-DIMER QUANTITATIVE ug/ml FEU 1.73*             Results from last 7 days   Lab Units 09/26/24  0443 09/25/24  0457   PROCALCITONIN ng/ml 1.15* 1.84*     Results from last 7 days   Lab Units 09/24/24  0110   BNP pg/mL 70       Results from last 7 days   Lab Units 09/24/24  0110   CRP mg/L 189.9*       ED Treatment-Medication Administration from 09/24/2024 0041 to 09/24/2024 0224         Date/Time Order Dose Route Action     09/24/2024 0052 ipratropium-albuterol (FOR EMS ONLY) (DUO-NEB) 0.5-2.5 mg/3 mL inhalation solution 6 mL 0 mL Does not apply Given to EMS     09/24/2024 0052 methylPREDNISolone sodium succinate (FOR EMS ONLY) (Solu-MEDROL) 125 MG injection 125 mg 0 mg Does not apply Given to EMS     09/24/2024 0111 acetaminophen (TYLENOL) tablet 975 mg 975 mg Oral Given     09/24/2024 0126 sodium chloride 0.9 % bolus 1,000 mL 1,000 mL Intravenous New Bag            Past Medical History:   Diagnosis Date    Asthma     Diabetes mellitus (HCC)      Environmental allergies     Hyperlipidemia     Hypertension     Macular degeneration 07/13/2020    right eye    Orthostatic hypotension     Osteopenia     Pneumothorax     Sinusitis      Present on Admission:   COVID-19   Type 2 diabetes mellitus with complication, without long-term current use of insulin (HCC)   Essential hypertension   Acute respiratory insufficiency   Hyponatremia      Admitting Diagnosis: Renal failure [N19]  SOB (shortness of breath) [R06.02]  Hypoxia [R09.02]  Fever [R50.9]  Shortness of breath [R06.02]  COVID [U07.1]  Age/Sex: 78 y.o. male  Admission Orders:  Scheduled Medications:  albuterol, 2 puff, Inhalation, 4x Daily  amLODIPine, 5 mg, Oral, BID  atenolol, 25 mg, Oral, Daily  baricitinib, 2 mg, Oral, Q24H  budesonide-formoterol, 2 puff, Inhalation, BID  famotidine, 20 mg, Oral, Daily  heparin (porcine), 5,000 Units, Subcutaneous, Q8H SANDRA  insulin lispro, 1-6 Units, Subcutaneous, TID AC  insulin lispro, 1-6 Units, Subcutaneous, HS  lisinopril, 20 mg, Oral, BID  methylPREDNISolone sodium succinate, 40 mg, Intravenous, Q12H SANDRA  montelukast, 10 mg, Oral, Daily  remdesivir, 100 mg, Intravenous, Q24H      Continuous IV Infusions: NONE      PRN Meds:  acetaminophen, 650 mg, Oral, Q6H PRN  ondansetron, 4 mg, Intravenous, Q6H PRN  albuterol (PROVENTIL HFA,VENTOLIN HFA) inhaler 2 puff  Dose: 2 puff  Freq: Every 4 hours PRN Route: IN  PRN Reasons: wheezing,shortness of breath  Start: 09/24/24 0313 End: 09/25/24 0911 - x1 given 9/24, x2 given 9/25.      None    Network Utilization Review Department  ATTENTION: Please call with any questions or concerns to [phone] and carefully listen to the prompts so that you are directed to the right person. All voicemails are confidential.   For Discharge needs, contact Care Management DC Support Team at [phone] opt. [phone]  Send all requests for admission clinical reviews, approved or denied determinations and any other requests to dedicated fax number  below belonging to the campus where the patient is receiving treatment. List of dedicated fax numbers for the Facilities:  FACILITY NAME UR FAX NUMBER   ADMISSION DENIALS (Administrative/Medical Necessity) [phone]   DISCHARGE SUPPORT TEAM (NETWORK) [phone]   PARENT CHILD HEALTH (Maternity/NICU/Pediatrics) [phone]   [facility] [phone]   [facility] [phone]   [facility] [phone]   [facility] [phone]   [facility] [phone]   [facility] [phone]   [facility] [phone]   [facility] [phone]   [facility] [phone]   [facility] [phone]   [facility] [phone]   [facility] [phone]

## 2024-09-25 NOTE — PROGRESS NOTES
Progress Note - Hospitalist   Name: Gold Van 78 y.o. male I MRN: [id number]  Unit/Bed#: -01 I Date of Admission: 9/24/2024   Date of Service: 9/25/2024 I Hospital Day: 1    Assessment & Plan  COVID-19  Secondary to COVID infection as well as underlying COPD  Continue remdesivir  Patient has been titrated off oxygen  Continue baricitinib  Will increase Solu-Medrol to 40 mg every 12 hours  Continue prehospital respiratory medications  Placed on O2 and respiratory protocol  Incentive spirometry  Give antipyretics and antitussives as needed  Type 2 diabetes mellitus with complication, without long-term current use of insulin ([facility])  Lab Results   Component Value Date    HGBA1C 9.7 (H) 09/24/2024       Recent Labs     09/24/24  1122 09/24/24  1628 09/24/24 2058 09/25/24  0739   POCGLU 361* 404* 296* 249*       Blood Sugar Average: Last 72 hrs:  (P) 300.5    Placed on [facility] step 2 diet  Hold prehospital oral antihyperglycemic's  Accu-Cheks before meals and at bedtime with Humalog correction dose before meals and at bedtime  Essential hypertension  Continue prehospital Norvasc 5 mg p.o. twice daily, atenolol 25 mg p.o. daily and lisinopril 20 mg p.o. twice daily  Hyponatremia  Patient has history of chronic hyponatremia  Will monitor BMP as needed  Acute respiratory insufficiency  Likely multifactorial given his COVID and history of COPD  Patient was hypoxic with O2 sats as low as 86% on room air  Being treated as per above    VTE Pharmacologic Prophylaxis: VTE Score: 6 Moderate Risk (Score 3-4) - Pharmacological DVT Prophylaxis Ordered: heparin.    Mobility:   Basic Mobility Inpatient Raw Score: 21  JH-HLM Goal: 6: Walk 10 steps or more  JH-HLM Achieved: 7: Walk 25 feet or more  JH-HLM Goal achieved. Continue to encourage appropriate mobility.    Patient Centered Rounds: I performed bedside rounds with nursing staff today.   Discussions with Specialists or Other Care Team Provider: yes    Education and  Discussions with Family / Patient:  Updated patient regarding plan of care.     Current Length of Stay: 1 day(s)  Current Patient Status: Inpatient   Certification Statement: The patient will continue to require additional inpatient hospital stay due to COVID infection  Discharge Plan: Anticipate discharge in 24-48 hrs to home.    Code Status: Level 1 - Full Code    Subjective   No overnight events noted    Objective     Vitals:   Temp (24hrs), Av.6 °F (36.4 °C), Min:97.6 °F (36.4 °C), Max:97.7 °F (36.5 °C)    Temp:  [97.6 °F (36.4 °C)-97.7 °F (36.5 °C)] 97.6 °F (36.4 °C)  HR:  [74-77] 76  Resp:  [16-18] 18  BP: (113-139)/(60-75) 134/75  SpO2:  [90 %-93 %] 90 %  Body mass index is 22.9 kg/m².     Input and Output Summary (last 24 hours):     Intake/Output Summary (Last 24 hours) at 2024 0910  Last data filed at 2024 1801  Gross per 24 hour   Intake 240 ml   Output 975 ml   Net -735 ml       Physical Exam  Constitutional:       General: He is not in acute distress.  HENT:      Head: Normocephalic and atraumatic.      Nose: Nose normal.      Mouth/Throat:      Mouth: Mucous membranes are moist.   Eyes:      Extraocular Movements: Extraocular movements intact.      Conjunctiva/sclera: Conjunctivae normal.   Cardiovascular:      Rate and Rhythm: Normal rate and regular rhythm.   Pulmonary:      Effort: Pulmonary effort is normal. No respiratory distress.      Comments: Decreased breath sounds bilaterally  Abdominal:      Palpations: Abdomen is soft.      Tenderness: There is no abdominal tenderness.   Musculoskeletal:         General: Normal range of motion.      Cervical back: Normal range of motion and neck supple.   Skin:     General: Skin is warm and dry.   Neurological:      General: No focal deficit present.      Mental Status: He is alert. Mental status is at baseline.      Cranial Nerves: No cranial nerve deficit.   Psychiatric:         Mood and Affect: Mood normal.         Behavior: Behavior  normal.          Lines/Drains:  Lines/Drains/Airways       Active Status       None                      Lab Results: I have reviewed the following results: CBC/BMP:   .     09/25/24  0457   WBC 9.02   HGB 11.2*   HCT 33.3*      SODIUM 134*   K 4.0      CO2 21   BUN 22   CREATININE 0.85   GLUC 230*       Results from last 7 days   Lab Units 09/25/24  0457 09/24/24  0450 09/24/24  0110   WBC Thousand/uL 9.02   < > 8.24   HEMOGLOBIN g/dL 11.2*   < > 12.6   HEMATOCRIT % 33.3*   < > 37.8   PLATELETS Thousands/uL 159   < > 165   SEGS PCT %  --   --  76*   LYMPHO PCT %  --   --  16   MONO PCT %  --   --  7   EOS PCT %  --   --  0    < > = values in this interval not displayed.     Results from last 7 days   Lab Units 09/25/24  0457 09/24/24  0629   SODIUM mmol/L 134* 133*   POTASSIUM mmol/L 4.0 3.7   CHLORIDE mmol/L 102 103   CO2 mmol/L 21 19*   BUN mg/dL 22 17   CREATININE mg/dL 0.85 1.07   ANION GAP mmol/L 11 11   CALCIUM mg/dL 8.4 7.9*   ALBUMIN g/dL  --  3.0*   TOTAL BILIRUBIN mg/dL  --  0.29   ALK PHOS U/L  --  38   ALT U/L  --  15   AST U/L  --  23   GLUCOSE RANDOM mg/dL 230* 260*         Results from last 7 days   Lab Units 09/25/24  0739 09/24/24  2058 09/24/24  1628 09/24/24  1122 09/24/24  0721 09/24/24  0243   POC GLUCOSE mg/dl 249* 296* 404* 361* 294* 199*     Results from last 7 days   Lab Units 09/24/24  0450   HEMOGLOBIN A1C % 9.7*     Results from last 7 days   Lab Units 09/25/24  0457   PROCALCITONIN ng/ml 1.84*       Recent Cultures (last 7 days):         Imaging Review: No pertinent imaging studies reviewed.  Other Studies: No additional pertinent studies reviewed.    Last 24 Hours Medication List:     Current Facility-Administered Medications:     acetaminophen (TYLENOL) tablet 650 mg, Q6H PRN    albuterol (PROVENTIL HFA,VENTOLIN HFA) inhaler 2 puff, Q4H PRN    amLODIPine (NORVASC) tablet 5 mg, BID    atenolol (TENORMIN) tablet 25 mg, Daily    baricitinib (OLUMIANT) tablet 2 mg, Q24H     budesonide-formoterol (SYMBICORT) 160-4.5 mcg/act inhaler 2 puff, BID    famotidine (PEPCID) tablet 20 mg, Daily    heparin (porcine) subcutaneous injection 5,000 Units, Q8H SANDRA    insulin lispro (HumALOG/ADMELOG) 100 units/mL subcutaneous injection 1-6 Units, TID AC **AND** Fingerstick Glucose (POCT), TID AC    insulin lispro (HumALOG/ADMELOG) 100 units/mL subcutaneous injection 1-6 Units, HS    ipratropium (ATROVENT) 0.02 % inhalation solution 0.5 mg, 4x Daily    levalbuterol (XOPENEX) inhalation solution 0.63 mg, TID    lisinopril (ZESTRIL) tablet 20 mg, BID    methylPREDNISolone sodium succinate (Solu-MEDROL) injection 40 mg, Q12H SANDRA    montelukast (SINGULAIR) tablet 10 mg, Daily    ondansetron (ZOFRAN) injection 4 mg, Q6H PRN    [COMPLETED] remdesivir (Veklury) 200 mg in sodium chloride 0.9 % 290 mL IVPB, Q24H **FOLLOWED BY** remdesivir (Veklury) 100 mg in sodium chloride 0.9 % 270 mL IVPB, Q24H    Administrative Statements   Today, Patient Was Seen By: Cora Sawant MD      **Please Note: This note may have been constructed using a voice recognition system.**

## 2024-09-25 NOTE — ASSESSMENT & PLAN NOTE
Secondary to COVID infection as well as underlying COPD  Continue remdesivir  Patient has been titrated off oxygen  Continue baricitinib  Will increase Solu-Medrol to 40 mg every 12 hours  Continue prehospital respiratory medications  Placed on O2 and respiratory protocol  Incentive spirometry  Give antipyretics and antitussives as needed

## 2024-09-25 NOTE — PLAN OF CARE
Problem: Nutrition/Hydration-ADULT  Goal: Nutrient/Hydration intake appropriate for improving, restoring or maintaining nutritional needs  Description: Monitor and assess patient's nutrition/hydration status for malnutrition. Collaborate with interdisciplinary team and initiate plan and interventions as ordered.  Monitor patient's weight and dietary intake as ordered or per policy. Utilize nutrition screening tool and intervene as necessary. Determine patient's food preferences and provide high-protein, high-caloric foods as appropriate.     INTERVENTIONS:  - Monitor oral intake, urinary output, labs, and treatment plans  - Assess nutrition and hydration status and recommend course of action  - Evaluate amount of meals eaten  - Assist patient with eating if necessary   - Allow adequate time for meals  - Recommend/ encourage appropriate diets, oral nutritional supplements, and vitamin/mineral supplements  - Order, calculate, and assess calorie counts as needed  - Recommend, monitor, and adjust tube feedings and TPN/PPN based on assessed needs  - Assess need for intravenous fluids  - Provide specific nutrition/hydration education as appropriate  - Include patient/family/caregiver in decisions related to nutrition  Outcome: Progressing     Problem: RESPIRATORY - ADULT  Goal: Achieves optimal ventilation and oxygenation  Description: INTERVENTIONS:  - Assess for changes in respiratory status  - Assess for changes in mentation and behavior  - Position to facilitate oxygenation and minimize respiratory effort  - Oxygen administered by appropriate delivery if ordered  - Initiate smoking cessation education as indicated  - Encourage broncho-pulmonary hygiene including cough, deep breathe, Incentive Spirometry  - Assess the need for suctioning and aspirate as needed  - Assess and instruct to report SOB or any respiratory difficulty  - Respiratory Therapy support as indicated  Outcome: Progressing

## 2024-09-26 VITALS
OXYGEN SATURATION: 92 % | RESPIRATION RATE: 14 BRPM | HEART RATE: 55 BPM | TEMPERATURE: 97.7 F | WEIGHT: 168.87 LBS | DIASTOLIC BLOOD PRESSURE: 76 MMHG | HEIGHT: 72 IN | BODY MASS INDEX: 22.87 KG/M2 | SYSTOLIC BLOOD PRESSURE: 140 MMHG

## 2024-09-26 LAB
ANION GAP SERPL CALCULATED.3IONS-SCNC: 11 MMOL/L (ref 4–13)
BUN SERPL-MCNC: 21 MG/DL (ref 5–25)
CALCIUM SERPL-MCNC: 8.2 MG/DL (ref 8.4–10.2)
CHLORIDE SERPL-SCNC: 100 MMOL/L (ref 96–108)
CK SERPL-CCNC: 292 U/L (ref 39–308)
CO2 SERPL-SCNC: 21 MMOL/L (ref 21–32)
CREAT SERPL-MCNC: 0.86 MG/DL (ref 0.6–1.3)
ERYTHROCYTE [DISTWIDTH] IN BLOOD BY AUTOMATED COUNT: 13.6 % (ref 11.6–15.1)
GFR SERPL CREATININE-BSD FRML MDRD: 83 ML/MIN/1.73SQ M
GLUCOSE SERPL-MCNC: 320 MG/DL (ref 65–140)
GLUCOSE SERPL-MCNC: 343 MG/DL (ref 65–140)
GLUCOSE SERPL-MCNC: 367 MG/DL (ref 65–140)
HCT VFR BLD AUTO: 35.7 % (ref 36.5–49.3)
HGB BLD-MCNC: 11.7 G/DL (ref 12–17)
MCH RBC QN AUTO: 30 PG (ref 26.8–34.3)
MCHC RBC AUTO-ENTMCNC: 32.8 G/DL (ref 31.4–37.4)
MCV RBC AUTO: 92 FL (ref 82–98)
PLATELET # BLD AUTO: 189 THOUSANDS/UL (ref 149–390)
PMV BLD AUTO: 10 FL (ref 8.9–12.7)
POTASSIUM SERPL-SCNC: 4.3 MMOL/L (ref 3.5–5.3)
PROCALCITONIN SERPL-MCNC: 1.15 NG/ML
RBC # BLD AUTO: 3.9 MILLION/UL (ref 3.88–5.62)
SODIUM SERPL-SCNC: 132 MMOL/L (ref 135–147)
WBC # BLD AUTO: 9.26 THOUSAND/UL (ref 4.31–10.16)

## 2024-09-26 PROCEDURE — 84145 PROCALCITONIN (PCT): CPT | Performed by: INTERNAL MEDICINE

## 2024-09-26 PROCEDURE — 82550 ASSAY OF CK (CPK): CPT | Performed by: INTERNAL MEDICINE

## 2024-09-26 PROCEDURE — 94760 N-INVAS EAR/PLS OXIMETRY 1: CPT

## 2024-09-26 PROCEDURE — 80048 BASIC METABOLIC PNL TOTAL CA: CPT | Performed by: INTERNAL MEDICINE

## 2024-09-26 PROCEDURE — 82948 REAGENT STRIP/BLOOD GLUCOSE: CPT

## 2024-09-26 PROCEDURE — 85027 COMPLETE CBC AUTOMATED: CPT | Performed by: INTERNAL MEDICINE

## 2024-09-26 PROCEDURE — 94640 AIRWAY INHALATION TREATMENT: CPT

## 2024-09-26 PROCEDURE — 99239 HOSP IP/OBS DSCHRG MGMT >30: CPT | Performed by: INTERNAL MEDICINE

## 2024-09-26 RX ORDER — ALBUTEROL SULFATE 0.83 MG/ML
2.5 SOLUTION RESPIRATORY (INHALATION) ONCE
Status: COMPLETED | OUTPATIENT
Start: 2024-09-26 | End: 2024-09-26

## 2024-09-26 RX ORDER — PREDNISONE 5 MG/1
5 TABLET ORAL DAILY
Status: ON HOLD
Start: 2024-09-30

## 2024-09-26 RX ORDER — PREDNISONE 20 MG/1
40 TABLET ORAL DAILY
Qty: 6 TABLET | Refills: 0 | Status: ON HOLD | OUTPATIENT
Start: 2024-09-26 | End: 2024-09-29

## 2024-09-26 RX ADMIN — METHYLPREDNISOLONE SODIUM SUCCINATE 40 MG: 40 INJECTION, POWDER, FOR SOLUTION INTRAMUSCULAR; INTRAVENOUS at 08:09

## 2024-09-26 RX ADMIN — MONTELUKAST 10 MG: 10 TABLET, FILM COATED ORAL at 08:09

## 2024-09-26 RX ADMIN — INSULIN LISPRO 5 UNITS: 100 INJECTION, SOLUTION INTRAVENOUS; SUBCUTANEOUS at 07:55

## 2024-09-26 RX ADMIN — BUDESONIDE AND FORMOTEROL FUMARATE DIHYDRATE 2 PUFF: 160; 4.5 AEROSOL RESPIRATORY (INHALATION) at 08:10

## 2024-09-26 RX ADMIN — ALBUTEROL SULFATE 2.5 MG: 2.5 SOLUTION RESPIRATORY (INHALATION) at 02:39

## 2024-09-26 RX ADMIN — HEPARIN SODIUM 5000 UNITS: 5000 INJECTION, SOLUTION INTRAVENOUS; SUBCUTANEOUS at 05:00

## 2024-09-26 RX ADMIN — REMDESIVIR 100 MG: 100 INJECTION, POWDER, LYOPHILIZED, FOR SOLUTION INTRAVENOUS at 02:24

## 2024-09-26 RX ADMIN — ALBUTEROL SULFATE 2 PUFF: 90 AEROSOL, METERED RESPIRATORY (INHALATION) at 08:10

## 2024-09-26 RX ADMIN — AMLODIPINE BESYLATE 5 MG: 5 TABLET ORAL at 08:09

## 2024-09-26 RX ADMIN — FAMOTIDINE 20 MG: 20 TABLET, FILM COATED ORAL at 08:09

## 2024-09-26 RX ADMIN — BARICITINIB 2 MG: 2 TABLET, FILM COATED ORAL at 02:24

## 2024-09-26 RX ADMIN — ATENOLOL 25 MG: 25 TABLET ORAL at 08:09

## 2024-09-26 RX ADMIN — LISINOPRIL 20 MG: 20 TABLET ORAL at 08:09

## 2024-09-26 NOTE — NURSING NOTE
Patient's IV removed with catheter tip intact. DSD and pressure applied. AVS reviewed with patient and no questions at this time.

## 2024-09-26 NOTE — ASSESSMENT & PLAN NOTE
Secondary to COVID infection as well as underlying COPD  Patient was on remdesivir and baricitinib while in the hospital  Patient has been titrated off oxygen  Also on Solu-Medrol  Clinically improved today  Will discharge on prednisone  Follow-up with PCP as outpatient

## 2024-09-26 NOTE — PLAN OF CARE
Problem: Nutrition/Hydration-ADULT  Goal: Nutrient/Hydration intake appropriate for improving, restoring or maintaining nutritional needs  Description: Monitor and assess patient's nutrition/hydration status for malnutrition. Collaborate with interdisciplinary team and initiate plan and interventions as ordered.  Monitor patient's weight and dietary intake as ordered or per policy. Utilize nutrition screening tool and intervene as necessary. Determine patient's food preferences and provide high-protein, high-caloric foods as appropriate.     INTERVENTIONS:  - Monitor oral intake, urinary output, labs, and treatment plans  - Assess nutrition and hydration status and recommend course of action  - Evaluate amount of meals eaten  - Assist patient with eating if necessary   - Allow adequate time for meals  - Recommend/ encourage appropriate diets, oral nutritional supplements, and vitamin/mineral supplements  - Order, calculate, and assess calorie counts as needed  - Recommend, monitor, and adjust tube feedings and TPN/PPN based on assessed needs  - Assess need for intravenous fluids  - Provide specific nutrition/hydration education as appropriate  - Include patient/family/caregiver in decisions related to nutrition  Outcome: Adequate for Discharge     Problem: RESPIRATORY - ADULT  Goal: Achieves optimal ventilation and oxygenation  Description: INTERVENTIONS:  - Assess for changes in respiratory status  - Assess for changes in mentation and behavior  - Position to facilitate oxygenation and minimize respiratory effort  - Oxygen administered by appropriate delivery if ordered  - Initiate smoking cessation education as indicated  - Encourage broncho-pulmonary hygiene including cough, deep breathe, Incentive Spirometry  - Assess the need for suctioning and aspirate as needed  - Assess and instruct to report SOB or any respiratory difficulty  - Respiratory Therapy support as indicated  Outcome: Adequate for Discharge      Problem: INFECTION - ADULT  Goal: Absence or prevention of progression during hospitalization  Description: INTERVENTIONS:  - Assess and monitor for signs and symptoms of infection  - Monitor lab/diagnostic results  - Monitor all insertion sites, i.e. indwelling lines, tubes, and drains  - Monitor endotracheal if appropriate and nasal secretions for changes in amount and color  - Nunda appropriate cooling/warming therapies per order  - Administer medications as ordered  - Instruct and encourage patient and family to use good hand hygiene technique  - Identify and instruct in appropriate isolation precautions for identified infection/condition  Outcome: Adequate for Discharge     Problem: SAFETY ADULT  Goal: Patient will remain free of falls  Description: INTERVENTIONS:  - Educate patient/family on patient safety including physical limitations  - Instruct patient to call for assistance with activity   - Consult OT/PT to assist with strengthening/mobility   - Keep Call bell within reach  - Keep bed low and locked with side rails adjusted as appropriate  - Keep care items and personal belongings within reach  - Initiate and maintain comfort rounds  - Make Fall Risk Sign visible to staff  - Offer Toileting every 2 Hours, in advance of need  - Initiate/Maintain bed/chair alarm  - Obtain necessary fall risk management equipment: non-slip socks, cane  - Apply yellow socks and bracelet for high fall risk patients  - Consider moving patient to room near nurses station  Outcome: Adequate for Discharge  Goal: Maintain or return to baseline ADL function  Description: INTERVENTIONS:  -  Assess patient's ability to carry out ADLs; assess patient's baseline for ADL function and identify physical deficits which impact ability to perform ADLs (bathing, care of mouth/teeth, toileting, grooming, dressing, etc.)  - Assess/evaluate cause of self-care deficits   - Assess range of motion  - Assess patient's mobility; develop plan if  impaired  - Assess patient's need for assistive devices and provide as appropriate  - Encourage maximum independence but intervene and supervise when necessary  - Involve family in performance of ADLs  - Assess for home care needs following discharge   - Consider OT consult to assist with ADL evaluation and planning for discharge  - Provide patient education as appropriate  Outcome: Adequate for Discharge  Goal: Maintains/Returns to pre admission functional level  Description: INTERVENTIONS:  - Perform AM-PAC 6 Click Basic Mobility/ Daily Activity assessment daily.  - Set and communicate daily mobility goal to care team and patient/family/caregiver.   - Collaborate with rehabilitation services on mobility goals if consulted  - Perform Range of Motion 3 times a day.  - Ambulate patient 3 times a day  - Out of bed to chair 3 times a day for meals  - Out of bed for toileting  - Record patient progress and toleration of activity level   Outcome: Adequate for Discharge     Problem: DISCHARGE PLANNING  Goal: Discharge to home or other facility with appropriate resources  Description: INTERVENTIONS:  - Identify barriers to discharge w/patient and caregiver  - Arrange for needed discharge resources and transportation as appropriate  - Identify discharge learning needs (meds, wound care, etc.)  - Arrange for interpretive services to assist at discharge as needed  - Refer to Case Management Department for coordinating discharge planning if the patient needs post-hospital services based on physician/advanced practitioner order or complex needs related to functional status, cognitive ability, or social support system  Outcome: Adequate for Discharge     Problem: Knowledge Deficit  Goal: Patient/family/caregiver demonstrates understanding of disease process, treatment plan, medications, and discharge instructions  Description: Complete learning assessment and assess knowledge base.  Interventions:  - Provide teaching at level of  understanding  - Provide teaching via preferred learning methods  Outcome: Adequate for Discharge

## 2024-09-26 NOTE — ASSESSMENT & PLAN NOTE
Patient has history of chronic hyponatremia  Stable.  Continue routine lab monitoring with PCP as outpatient

## 2024-09-26 NOTE — DISCHARGE SUMMARY
Discharge Summary - Hospitalist   Name: Gold Van 78 y.o. male I MRN: [id number]  Unit/Bed#: -01 I Date of Admission: 9/24/2024   Date of Service: 9/26/2024 I Hospital Day: 2     Assessment & Plan  COVID-19  Secondary to COVID infection as well as underlying COPD  Patient was on remdesivir and baricitinib while in the hospital  Patient has been titrated off oxygen  Also on Solu-Medrol  Clinically improved today  Will discharge on prednisone  Follow-up with PCP as outpatient  Type 2 diabetes mellitus with complication, without long-term current use of insulin ([facility])  Lab Results   Component Value Date    HGBA1C 9.7 (H) 09/24/2024       Recent Labs     09/25/24  1050 09/25/24  1640 09/25/24  2113 09/26/24  0749   POCGLU 357* 361* 300* 343*       Blood Sugar Average: Last 72 hrs:  (P) 316.4    Resume home diabetic regimen  Essential hypertension  Continue prehospital Norvasc 5 mg p.o. twice daily, atenolol 25 mg p.o. daily and lisinopril 20 mg p.o. twice daily  Hyponatremia  Patient has history of chronic hyponatremia  Stable.  Continue routine lab monitoring with PCP as outpatient  Acute respiratory insufficiency  Likely multifactorial given his COVID and history of COPD  Patient was hypoxic with O2 sats as low as 86% on room air  Being treated as per above     Medical Problems       Resolved Problems  Date Reviewed: 9/24/2024   None       Discharging Physician / Practitioner: Cora Sawant MD  PCP: Shayne Diaz MD  Admission Date:   Admission Orders (From admission, onward)       Ordered        09/24/24 0156  INPATIENT ADMISSION  Once            09/24/24 0157  Inpatient Admission  Once                          Discharge Date: 09/26/24    Consultations During Hospital Stay:  None    Procedures Performed:   None    Significant Findings / Test Results:   COVID infection    Incidental Findings:   None    Test Results Pending at Discharge (will require follow up):   None     Outpatient Tests  Requested:  Routine labs with PCP as outpatient    Complications: None    Reason for Admission: COVID infection    Hospital Course:   Gold Van is a 78 y.o. male patient who originally presented to the hospital on 9/24/2024 due to shortness of breath.  Patient states that he tested positive for COVID at home prior to admission.  Patient was started on remdesivir and baricitinib on admission as patient was requiring oxygen.  Patient was also started on steroids.  Patient has gradually improved.  Currently saturating well off oxygen.  States he is feeling better today.  Stable for discharge home with outpatient follow-up.   discharge on prednisone 40 mg daily x 3 days and then advised to resume his home prednisone dosing.  Please see above list of diagnoses and related plan for additional information.     Condition at Discharge: stable    Discharge Day Visit / Exam:   Subjective: No complaints at this time  Vitals: Blood Pressure: 140/76 (09/26/24 0751)  Pulse: 55 (09/26/24 0751)  Temperature: 97.7 °F (36.5 °C) (09/26/24 0751)  Temp Source: Oral (09/24/24 0714)  Respirations: 14 (09/26/24 0751)  Height: 6' (182.9 cm) (09/24/24 0225)  Weight - Scale: 76.6 kg (168 lb 14 oz) (09/24/24 0225)  SpO2: 92 % (09/26/24 0751)  Exam:   Physical Exam  Constitutional:       General: He is not in acute distress.  HENT:      Head: Normocephalic and atraumatic.      Nose: Nose normal.      Mouth/Throat:      Mouth: Mucous membranes are moist.   Eyes:      Extraocular Movements: Extraocular movements intact.      Conjunctiva/sclera: Conjunctivae normal.   Cardiovascular:      Rate and Rhythm: Normal rate and regular rhythm.   Pulmonary:      Effort: Pulmonary effort is normal. No respiratory distress.   Abdominal:      Palpations: Abdomen is soft.      Tenderness: There is no abdominal tenderness.   Musculoskeletal:         General: Normal range of motion.      Cervical back: Normal range of motion and neck supple.   Skin:      General: Skin is warm and dry.   Neurological:      General: No focal deficit present.      Mental Status: He is alert. Mental status is at baseline.      Cranial Nerves: No cranial nerve deficit.   Psychiatric:         Mood and Affect: Mood normal.         Behavior: Behavior normal.          Discussion with Family:  Updated patient regarding discharge plan.     Discharge instructions/Information to patient and family:   See after visit summary for information provided to patient and family.      Provisions for Follow-Up Care:  See after visit summary for information related to follow-up care and any pertinent home health orders.      Mobility at time of Discharge:   Basic Mobility Inpatient Raw Score: 21  -HLM Goal: 6: Walk 10 steps or more  JH-HLM Achieved: 4: Move to chair/commode  HLM Goal achieved. Continue to encourage appropriate mobility.     Disposition:   Home    Planned Readmission: no    Discharge Medications:  See after visit summary for reconciled discharge medications provided to patient and/or family.      Administrative Statements   Discharge Statement:  I have spent a total time of 35 minutes in caring for this patient on the day of the visit/encounter. >30 minutes of time was spent on: Diagnostic results, Patient and family education, Counseling / Coordination of care, Documenting in the medical record, Reviewing / ordering tests, medicine, procedures  , and Communicating with other healthcare professionals .    **Please Note: This note may have been constructed using a voice recognition system**

## 2024-09-26 NOTE — DISCHARGE INSTR - AVS FIRST PAGE
Take prednisone 40 mg daily starting on 9/27/2024 for 3 days.  On 9/30/2024 you may resume your home prednisone dosing

## 2024-09-26 NOTE — ASSESSMENT & PLAN NOTE
Lab Results   Component Value Date    HGBA1C 9.7 (H) 09/24/2024       Recent Labs     09/25/24  1050 09/25/24  1640 09/25/24  2113 09/26/24  0749   POCGLU 357* 361* 300* 343*       Blood Sugar Average: Last 72 hrs:  (P) 316.4    Resume home diabetic regimen

## 2024-09-27 NOTE — UTILIZATION REVIEW
NOTIFICATION OF ADMISSION DISCHARGE   This is a Notification of Discharge from [facility]. Please be advised that this patient has been discharge from our facility. Below you will find the admission and discharge date and time including the patient’s disposition.   UTILIZATION REVIEW CONTACT:  Jose E See  Utilization   Network Utilization Review Department  Phone: [phone] x carefully listen to the prompts. All voicemails are confidential.  Email: [email or web address]     ADMISSION INFORMATION  PRESENTATION DATE: 9/24/2024 12:41 AM  OBERVATION ADMISSION DATE: N/A  INPATIENT ADMISSION DATE: 9/24/24  1:56 AM   DISCHARGE DATE: 9/26/2024 11:50 AM   DISPOSITION:Home/Self Care    Network Utilization Review Department  ATTENTION: Please call with any questions or concerns to [phone] and carefully listen to the prompts so that you are directed to the right person. All voicemails are confidential.   For Discharge needs, contact Care Management DC Support Team at [phone] opt. 2  Send all requests for admission clinical reviews, approved or denied determinations and any other requests to dedicated fax number below belonging to the campus where the patient is receiving treatment. List of dedicated fax numbers for the Facilities:  FACILITY NAME UR FAX NUMBER   ADMISSION DENIALS (Administrative/Medical Necessity) [phone]   DISCHARGE SUPPORT TEAM ([facility]) [phone]   PARENT CHILD HEALTH (Maternity/NICU/Pediatrics) [phone]   [facility] [phone]   [facility] [phone]   [facility] [phone]   [facility] [phone]   [facility] [phone]   [facility] [phone]   [facility] [phone]   [facility] [phone]    [facility] [phone]   [facility] [phone]   [facility] [phone]   [facility] [phone]

## 2024-09-29 ENCOUNTER — HOSPITAL ENCOUNTER (INPATIENT)
Facility: HOSPITAL | Age: 79
LOS: 5 days | DRG: 871 | End: 2024-10-04
Attending: EMERGENCY MEDICINE | Admitting: INTERNAL MEDICINE
Payer: COMMERCIAL

## 2024-09-29 ENCOUNTER — APPOINTMENT (EMERGENCY)
Dept: RADIOLOGY | Facility: HOSPITAL | Age: 79
DRG: 871 | End: 2024-09-29
Payer: COMMERCIAL

## 2024-09-29 DIAGNOSIS — R93.1 ABNORMAL ECHOCARDIOGRAM: ICD-10-CM

## 2024-09-29 DIAGNOSIS — J44.89 COPD WITH ASTHMA (HCC): ICD-10-CM

## 2024-09-29 DIAGNOSIS — K21.9 GERD (GASTROESOPHAGEAL REFLUX DISEASE): ICD-10-CM

## 2024-09-29 DIAGNOSIS — U07.1 COVID-19: ICD-10-CM

## 2024-09-29 DIAGNOSIS — R27.9 DISCOORDINATION: ICD-10-CM

## 2024-09-29 DIAGNOSIS — J18.9 PNEUMONIA: ICD-10-CM

## 2024-09-29 DIAGNOSIS — J44.1 COPD EXACERBATION (HCC): Primary | ICD-10-CM

## 2024-09-29 DIAGNOSIS — I63.9 CEREBROVASCULAR ACCIDENT (CVA), UNSPECIFIED MECHANISM (HCC): ICD-10-CM

## 2024-09-29 DIAGNOSIS — A41.9 SEPSIS (HCC): ICD-10-CM

## 2024-09-29 LAB
2HR DELTA HS TROPONIN: -1 NG/L
4HR DELTA HS TROPONIN: -1 NG/L
ALBUMIN SERPL BCG-MCNC: 3.4 G/DL (ref 3.5–5)
ALP SERPL-CCNC: 59 U/L (ref 34–104)
ALT SERPL W P-5'-P-CCNC: 21 U/L (ref 7–52)
ANION GAP SERPL CALCULATED.3IONS-SCNC: 11 MMOL/L (ref 4–13)
APTT PPP: 22 SECONDS (ref 23–34)
AST SERPL W P-5'-P-CCNC: 13 U/L (ref 13–39)
ATRIAL RATE: 123 BPM
ATRIAL RATE: 98 BPM
BASE EX.OXY STD BLDV CALC-SCNC: 82.7 % (ref 60–80)
BASE EXCESS BLDV CALC-SCNC: -1.2 MMOL/L
BASOPHILS # BLD AUTO: 0.02 THOUSANDS/ΜL (ref 0–0.1)
BASOPHILS NFR BLD AUTO: 0 % (ref 0–1)
BILIRUB SERPL-MCNC: 0.87 MG/DL (ref 0.2–1)
BNP SERPL-MCNC: 139 PG/ML (ref 0–100)
BUN SERPL-MCNC: 23 MG/DL (ref 5–25)
CALCIUM ALBUM COR SERPL-MCNC: 9.8 MG/DL (ref 8.3–10.1)
CALCIUM SERPL-MCNC: 9.3 MG/DL (ref 8.4–10.2)
CARDIAC TROPONIN I PNL SERPL HS: 10 NG/L
CARDIAC TROPONIN I PNL SERPL HS: 9 NG/L
CARDIAC TROPONIN I PNL SERPL HS: 9 NG/L
CHLORIDE SERPL-SCNC: 98 MMOL/L (ref 96–108)
CO2 SERPL-SCNC: 25 MMOL/L (ref 21–32)
CREAT SERPL-MCNC: 1.07 MG/DL (ref 0.6–1.3)
EOSINOPHIL # BLD AUTO: 0.01 THOUSAND/ΜL (ref 0–0.61)
EOSINOPHIL NFR BLD AUTO: 0 % (ref 0–6)
ERYTHROCYTE [DISTWIDTH] IN BLOOD BY AUTOMATED COUNT: 13.2 % (ref 11.6–15.1)
GFR SERPL CREATININE-BSD FRML MDRD: 66 ML/MIN/1.73SQ M
GLUCOSE SERPL-MCNC: 279 MG/DL (ref 65–140)
GLUCOSE SERPL-MCNC: 398 MG/DL (ref 65–140)
HCO3 BLDV-SCNC: 23.6 MMOL/L (ref 24–30)
HCT VFR BLD AUTO: 39.8 % (ref 36.5–49.3)
HGB BLD-MCNC: 13.5 G/DL (ref 12–17)
IMM GRANULOCYTES # BLD AUTO: 0.13 THOUSAND/UL (ref 0–0.2)
IMM GRANULOCYTES NFR BLD AUTO: 1 % (ref 0–2)
INR PPP: 1.01 (ref 0.85–1.19)
LACTATE SERPL-SCNC: 1.3 MMOL/L (ref 0.5–2)
LACTATE SERPL-SCNC: 2.6 MMOL/L (ref 0.5–2)
LYMPHOCYTES # BLD AUTO: 1.39 THOUSANDS/ΜL (ref 0.6–4.47)
LYMPHOCYTES NFR BLD AUTO: 9 % (ref 14–44)
MCH RBC QN AUTO: 30.3 PG (ref 26.8–34.3)
MCHC RBC AUTO-ENTMCNC: 33.9 G/DL (ref 31.4–37.4)
MCV RBC AUTO: 89 FL (ref 82–98)
MONOCYTES # BLD AUTO: 0.56 THOUSAND/ΜL (ref 0.17–1.22)
MONOCYTES NFR BLD AUTO: 4 % (ref 4–12)
NEUTROPHILS # BLD AUTO: 13.02 THOUSANDS/ΜL (ref 1.85–7.62)
NEUTS SEG NFR BLD AUTO: 86 % (ref 43–75)
NRBC BLD AUTO-RTO: 0 /100 WBCS
O2 CT BLDV-SCNC: 16.5 ML/DL
P AXIS: 63 DEGREES
P AXIS: 84 DEGREES
PCO2 BLDV: 40.1 MM HG (ref 42–50)
PH BLDV: 7.39 [PH] (ref 7.3–7.4)
PLATELET # BLD AUTO: 275 THOUSANDS/UL (ref 149–390)
PMV BLD AUTO: 9.8 FL (ref 8.9–12.7)
PO2 BLDV: 49.7 MM HG (ref 35–45)
POTASSIUM SERPL-SCNC: 4.3 MMOL/L (ref 3.5–5.3)
PR INTERVAL: 128 MS
PR INTERVAL: 168 MS
PROCALCITONIN SERPL-MCNC: 0.19 NG/ML
PROT SERPL-MCNC: 7 G/DL (ref 6.4–8.4)
PROTHROMBIN TIME: 13.8 SECONDS (ref 12.3–15)
QRS AXIS: 82 DEGREES
QRS AXIS: 88 DEGREES
QRSD INTERVAL: 76 MS
QRSD INTERVAL: 86 MS
QT INTERVAL: 314 MS
QT INTERVAL: 374 MS
QTC INTERVAL: 449 MS
QTC INTERVAL: 477 MS
RBC # BLD AUTO: 4.45 MILLION/UL (ref 3.88–5.62)
SODIUM SERPL-SCNC: 134 MMOL/L (ref 135–147)
T WAVE AXIS: 260 DEGREES
T WAVE AXIS: 70 DEGREES
VENTRICULAR RATE: 123 BPM
VENTRICULAR RATE: 98 BPM
WBC # BLD AUTO: 15.13 THOUSAND/UL (ref 4.31–10.16)

## 2024-09-29 PROCEDURE — 94664 DEMO&/EVAL PT USE INHALER: CPT

## 2024-09-29 PROCEDURE — 71045 X-RAY EXAM CHEST 1 VIEW: CPT

## 2024-09-29 PROCEDURE — 93005 ELECTROCARDIOGRAM TRACING: CPT

## 2024-09-29 PROCEDURE — 94760 N-INVAS EAR/PLS OXIMETRY 1: CPT

## 2024-09-29 PROCEDURE — 85610 PROTHROMBIN TIME: CPT | Performed by: EMERGENCY MEDICINE

## 2024-09-29 PROCEDURE — 96365 THER/PROPH/DIAG IV INF INIT: CPT

## 2024-09-29 PROCEDURE — 96375 TX/PRO/DX INJ NEW DRUG ADDON: CPT

## 2024-09-29 PROCEDURE — 87040 BLOOD CULTURE FOR BACTERIA: CPT | Performed by: EMERGENCY MEDICINE

## 2024-09-29 PROCEDURE — 84145 PROCALCITONIN (PCT): CPT

## 2024-09-29 PROCEDURE — 80053 COMPREHEN METABOLIC PANEL: CPT | Performed by: EMERGENCY MEDICINE

## 2024-09-29 PROCEDURE — 82805 BLOOD GASES W/O2 SATURATION: CPT

## 2024-09-29 PROCEDURE — 99285 EMERGENCY DEPT VISIT HI MDM: CPT

## 2024-09-29 PROCEDURE — 82948 REAGENT STRIP/BLOOD GLUCOSE: CPT

## 2024-09-29 PROCEDURE — 83880 ASSAY OF NATRIURETIC PEPTIDE: CPT | Performed by: EMERGENCY MEDICINE

## 2024-09-29 PROCEDURE — 93010 ELECTROCARDIOGRAM REPORT: CPT | Performed by: INTERNAL MEDICINE

## 2024-09-29 PROCEDURE — 84484 ASSAY OF TROPONIN QUANT: CPT | Performed by: EMERGENCY MEDICINE

## 2024-09-29 PROCEDURE — 99223 1ST HOSP IP/OBS HIGH 75: CPT | Performed by: INTERNAL MEDICINE

## 2024-09-29 PROCEDURE — 85025 COMPLETE CBC W/AUTO DIFF WBC: CPT | Performed by: EMERGENCY MEDICINE

## 2024-09-29 PROCEDURE — 87154 CUL TYP ID BLD PTHGN 6+ TRGT: CPT | Performed by: EMERGENCY MEDICINE

## 2024-09-29 PROCEDURE — 94002 VENT MGMT INPAT INIT DAY: CPT

## 2024-09-29 PROCEDURE — 36415 COLL VENOUS BLD VENIPUNCTURE: CPT | Performed by: EMERGENCY MEDICINE

## 2024-09-29 PROCEDURE — 83605 ASSAY OF LACTIC ACID: CPT | Performed by: EMERGENCY MEDICINE

## 2024-09-29 PROCEDURE — 87147 CULTURE TYPE IMMUNOLOGIC: CPT | Performed by: EMERGENCY MEDICINE

## 2024-09-29 PROCEDURE — 94640 AIRWAY INHALATION TREATMENT: CPT

## 2024-09-29 PROCEDURE — 99285 EMERGENCY DEPT VISIT HI MDM: CPT | Performed by: EMERGENCY MEDICINE

## 2024-09-29 PROCEDURE — 85730 THROMBOPLASTIN TIME PARTIAL: CPT | Performed by: EMERGENCY MEDICINE

## 2024-09-29 RX ORDER — ENOXAPARIN SODIUM 100 MG/ML
40 INJECTION SUBCUTANEOUS DAILY
Status: DISCONTINUED | OUTPATIENT
Start: 2024-09-29 | End: 2024-10-04 | Stop reason: HOSPADM

## 2024-09-29 RX ORDER — AMLODIPINE BESYLATE 5 MG/1
5 TABLET ORAL 2 TIMES DAILY
Status: DISCONTINUED | OUTPATIENT
Start: 2024-09-29 | End: 2024-09-29

## 2024-09-29 RX ORDER — ALBUTEROL SULFATE 90 UG/1
1 INHALANT RESPIRATORY (INHALATION) EVERY 4 HOURS PRN
Status: DISCONTINUED | OUTPATIENT
Start: 2024-09-29 | End: 2024-09-29

## 2024-09-29 RX ORDER — FAMOTIDINE 20 MG/1
20 TABLET, FILM COATED ORAL 2 TIMES DAILY
Status: DISCONTINUED | OUTPATIENT
Start: 2024-09-29 | End: 2024-10-04 | Stop reason: HOSPADM

## 2024-09-29 RX ORDER — CEFTRIAXONE 1 G/50ML
1000 INJECTION, SOLUTION INTRAVENOUS EVERY 24 HOURS
Status: DISCONTINUED | OUTPATIENT
Start: 2024-09-30 | End: 2024-10-02

## 2024-09-29 RX ORDER — BUDESONIDE AND FORMOTEROL FUMARATE DIHYDRATE 160; 4.5 UG/1; UG/1
2 AEROSOL RESPIRATORY (INHALATION) 2 TIMES DAILY
Status: DISCONTINUED | OUTPATIENT
Start: 2024-09-29 | End: 2024-09-30

## 2024-09-29 RX ORDER — BENZONATATE 100 MG/1
100 CAPSULE ORAL 3 TIMES DAILY PRN
Status: DISCONTINUED | OUTPATIENT
Start: 2024-09-29 | End: 2024-10-04 | Stop reason: HOSPADM

## 2024-09-29 RX ORDER — LISINOPRIL 20 MG/1
20 TABLET ORAL 2 TIMES DAILY
Status: DISCONTINUED | OUTPATIENT
Start: 2024-09-29 | End: 2024-10-04 | Stop reason: HOSPADM

## 2024-09-29 RX ORDER — INSULIN LISPRO 100 [IU]/ML
1-5 INJECTION, SOLUTION INTRAVENOUS; SUBCUTANEOUS
Status: DISCONTINUED | OUTPATIENT
Start: 2024-09-29 | End: 2024-09-30

## 2024-09-29 RX ORDER — METHYLPREDNISOLONE SODIUM SUCCINATE 40 MG/ML
40 INJECTION, POWDER, LYOPHILIZED, FOR SOLUTION INTRAMUSCULAR; INTRAVENOUS EVERY 8 HOURS
Status: DISCONTINUED | OUTPATIENT
Start: 2024-09-29 | End: 2024-09-30

## 2024-09-29 RX ORDER — ACETAMINOPHEN 325 MG/1
650 TABLET ORAL EVERY 6 HOURS PRN
Status: DISCONTINUED | OUTPATIENT
Start: 2024-09-29 | End: 2024-10-04 | Stop reason: HOSPADM

## 2024-09-29 RX ORDER — CEFTRIAXONE 1 G/50ML
1000 INJECTION, SOLUTION INTRAVENOUS ONCE
Status: COMPLETED | OUTPATIENT
Start: 2024-09-29 | End: 2024-09-29

## 2024-09-29 RX ORDER — MONTELUKAST SODIUM 10 MG/1
10 TABLET ORAL DAILY
Status: DISCONTINUED | OUTPATIENT
Start: 2024-09-29 | End: 2024-09-29

## 2024-09-29 RX ORDER — MONTELUKAST SODIUM 10 MG/1
10 TABLET ORAL
Status: DISCONTINUED | OUTPATIENT
Start: 2024-09-29 | End: 2024-10-04 | Stop reason: HOSPADM

## 2024-09-29 RX ORDER — IPRATROPIUM BROMIDE AND ALBUTEROL SULFATE .5; 3 MG/3ML; MG/3ML
1 SOLUTION RESPIRATORY (INHALATION) ONCE
Status: COMPLETED | OUTPATIENT
Start: 2024-09-29 | End: 2024-09-29

## 2024-09-29 RX ORDER — AZITHROMYCIN 250 MG/1
500 TABLET, FILM COATED ORAL EVERY 24 HOURS
Status: DISCONTINUED | OUTPATIENT
Start: 2024-09-29 | End: 2024-09-29

## 2024-09-29 RX ORDER — ATENOLOL 25 MG/1
25 TABLET ORAL DAILY
Status: DISCONTINUED | OUTPATIENT
Start: 2024-09-29 | End: 2024-09-29

## 2024-09-29 RX ORDER — AMLODIPINE BESYLATE 5 MG/1
5 TABLET ORAL 2 TIMES DAILY
Status: DISCONTINUED | OUTPATIENT
Start: 2024-09-29 | End: 2024-10-04 | Stop reason: HOSPADM

## 2024-09-29 RX ORDER — LORAZEPAM 0.5 MG/1
0.5 TABLET ORAL
Status: DISCONTINUED | OUTPATIENT
Start: 2024-09-29 | End: 2024-10-04 | Stop reason: HOSPADM

## 2024-09-29 RX ORDER — ATENOLOL 25 MG/1
25 TABLET ORAL DAILY
Status: DISCONTINUED | OUTPATIENT
Start: 2024-09-29 | End: 2024-10-04 | Stop reason: HOSPADM

## 2024-09-29 RX ORDER — INSULIN LISPRO 100 [IU]/ML
10 INJECTION, SOLUTION INTRAVENOUS; SUBCUTANEOUS ONCE
Status: COMPLETED | OUTPATIENT
Start: 2024-09-29 | End: 2024-09-29

## 2024-09-29 RX ORDER — METHYLPREDNISOLONE SODIUM SUCCINATE 125 MG/2ML
80 INJECTION, POWDER, LYOPHILIZED, FOR SOLUTION INTRAMUSCULAR; INTRAVENOUS ONCE
Status: COMPLETED | OUTPATIENT
Start: 2024-09-29 | End: 2024-09-29

## 2024-09-29 RX ORDER — ALBUTEROL SULFATE 0.83 MG/ML
2.5 SOLUTION RESPIRATORY (INHALATION) EVERY 4 HOURS PRN
Status: DISCONTINUED | OUTPATIENT
Start: 2024-09-29 | End: 2024-10-04 | Stop reason: HOSPADM

## 2024-09-29 RX ORDER — ALBUTEROL SULFATE 90 UG/1
2 INHALANT RESPIRATORY (INHALATION) EVERY 4 HOURS PRN
Status: DISCONTINUED | OUTPATIENT
Start: 2024-09-29 | End: 2024-10-04 | Stop reason: HOSPADM

## 2024-09-29 RX ORDER — GUAIFENESIN 600 MG/1
600 TABLET, EXTENDED RELEASE ORAL 2 TIMES DAILY
Status: DISCONTINUED | OUTPATIENT
Start: 2024-09-29 | End: 2024-10-04 | Stop reason: HOSPADM

## 2024-09-29 RX ORDER — INSULIN GLARGINE 100 [IU]/ML
10 INJECTION, SOLUTION SUBCUTANEOUS
Status: DISCONTINUED | OUTPATIENT
Start: 2024-09-29 | End: 2024-10-02

## 2024-09-29 RX ORDER — ALBUTEROL SULFATE 0.83 MG/ML
1.25 SOLUTION RESPIRATORY (INHALATION) EVERY 6 HOURS PRN
Status: DISCONTINUED | OUTPATIENT
Start: 2024-09-29 | End: 2024-09-29

## 2024-09-29 RX ADMIN — AZITHROMYCIN MONOHYDRATE 500 MG: 500 INJECTION, POWDER, LYOPHILIZED, FOR SOLUTION INTRAVENOUS at 11:41

## 2024-09-29 RX ADMIN — METHYLPREDNISOLONE SODIUM SUCCINATE 40 MG: 40 INJECTION, POWDER, FOR SOLUTION INTRAMUSCULAR; INTRAVENOUS at 17:50

## 2024-09-29 RX ADMIN — METHYLPREDNISOLONE SODIUM SUCCINATE 80 MG: 125 INJECTION, POWDER, FOR SOLUTION INTRAMUSCULAR; INTRAVENOUS at 10:23

## 2024-09-29 RX ADMIN — INSULIN LISPRO 5 UNITS: 100 INJECTION, SOLUTION INTRAVENOUS; SUBCUTANEOUS at 22:24

## 2024-09-29 RX ADMIN — CEFTRIAXONE 1000 MG: 1 INJECTION, SOLUTION INTRAVENOUS at 10:55

## 2024-09-29 RX ADMIN — ENOXAPARIN SODIUM 40 MG: 40 INJECTION SUBCUTANEOUS at 15:41

## 2024-09-29 RX ADMIN — ALBUTEROL SULFATE 2.5 MG: 2.5 SOLUTION RESPIRATORY (INHALATION) at 14:46

## 2024-09-29 RX ADMIN — GUAIFENESIN 600 MG: 600 TABLET ORAL at 17:37

## 2024-09-29 RX ADMIN — AMLODIPINE BESYLATE 5 MG: 5 TABLET ORAL at 22:24

## 2024-09-29 RX ADMIN — INSULIN LISPRO 10 UNITS: 100 INJECTION, SOLUTION INTRAVENOUS; SUBCUTANEOUS at 17:48

## 2024-09-29 RX ADMIN — INSULIN LISPRO 5 UNITS: 100 INJECTION, SOLUTION INTRAVENOUS; SUBCUTANEOUS at 17:40

## 2024-09-29 RX ADMIN — FAMOTIDINE 20 MG: 20 TABLET, FILM COATED ORAL at 17:37

## 2024-09-29 RX ADMIN — LISINOPRIL 20 MG: 20 TABLET ORAL at 15:41

## 2024-09-29 RX ADMIN — BUDESONIDE AND FORMOTEROL FUMARATE DIHYDRATE 2 PUFF: 160; 4.5 AEROSOL RESPIRATORY (INHALATION) at 22:28

## 2024-09-29 RX ADMIN — LISINOPRIL 20 MG: 20 TABLET ORAL at 22:24

## 2024-09-29 RX ADMIN — MONTELUKAST 10 MG: 10 TABLET, FILM COATED ORAL at 22:24

## 2024-09-29 RX ADMIN — INSULIN GLARGINE 10 UNITS: 100 INJECTION, SOLUTION SUBCUTANEOUS at 22:24

## 2024-09-29 NOTE — ASSESSMENT & PLAN NOTE
Met sepsis criteria with tachycardia and leukocytosis (noted to be on chronic steroids)  Imaging with new left lingular markings  Lactic acid 2.6-->1.3 without intervention   Started on ceftriaxone and azithromycin in the ER  Blood cultures are pending  Monitor labs and vital signs

## 2024-09-29 NOTE — ASSESSMENT & PLAN NOTE
"Lab Results   Component Value Date    HGBA1C 9.7 (H) 09/24/2024       No results for input(s): \"POCGLU\" in the last 72 hours.    Blood Sugar Average: Last 72 hrs:    Diabetic diet  Fingerstick blood sugar with sliding scale coverage  Hold home glipizide and metformin, resume on discharge  Monitor glucose in setting of steroid administration and adjust medication as indicated  "

## 2024-09-29 NOTE — H&P
"H&P - Hospitalist   Name: Gold Van 78 y.o. male I MRN: [id number]  Unit/Bed#: -01 I Date of Admission: 9/29/2024   Date of Service: 9/29/2024 I Hospital Day: 0     Assessment & Plan  Acute respiratory failure with hypoxia (HCC)  Arrived in suspected COPD exacerbation/pneumonia on CPAP, later required BiPAP, now on nasal cannula oxygen  Treat suspected underlying cause of COPD exacerbation and suspected pneumonia  Monitor oxygen requirements and wean as tolerated  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HCC)  Suspected COPD exacerbation, lung sounds decreased on exam  Arrived to ER on CPAP, transferred over to BiPAP for 2 hours .  Received IV steroids while in the ER   Evaluated by critical care who felt that patient is stable for medical admission  Continue Solu-Medrol 40 mg IV TID for now  Oxygen and respiratory protocol  Continue nebulized medication  Start Mucinex, continue Singulair  Conduct serial physical assessments  Sepsis due to pneumonia (HCC)  Met sepsis criteria with tachycardia and leukocytosis (noted to be on chronic steroids)  Imaging with new left lingular markings  Lactic acid 2.6-->1.3 without intervention   Started on ceftriaxone and azithromycin in the ER  Blood cultures are pending  Monitor labs and vital signs  Type 2 diabetes mellitus with complication, without long-term current use of insulin ([facility])  Lab Results   Component Value Date    HGBA1C 9.7 (H) 09/24/2024       No results for input(s): \"POCGLU\" in the last 72 hours.    Blood Sugar Average: Last 72 hrs:    Diabetic diet  Fingerstick blood sugar with sliding scale coverage  Hold home glipizide and metformin, resume on discharge  Monitor glucose in setting of steroid administration and adjust medication as indicated  Essential hypertension  Continue amlodipine 5 mg p.o. twice daily and atenolol 25 mg p.o. daily  Monitor blood pressure  COVID-19  Day 9 of infection, hospitalized 9/24/2024, received remdesivir and baricitinib  Pruritus  Takes " prednisone chronically 10 mg alternating with 15 mg.  Oral steroids on hold while receiving IV Solu-Medrol    VTE Pharmacologic Prophylaxis: VTE Score: 6 High Risk (Score >/= 5) - Pharmacological DVT Prophylaxis Ordered: enoxaparin (Lovenox). Sequential Compression Devices Ordered.  Code Status: Level 1 - Full Code   Discussion with family: Updated  (wife) at bedside.    Anticipated Length of Stay: Patient will be admitted on an inpatient basis with an anticipated length of stay of greater than 2 midnights secondary to acute respiratory failure, COPD exacerbation, sepsis, pneumonia.    History of Present Illness   Chief Complaint: Shortness of breath    Gold Van is a 78 y.o. male with a PMH of COPD with asthma, COVID infection, essential hypertension, non-insulin-dependent type 2 diabetes, pneumothorax, chronic steroid use, who presents with shortness of breath. He had a recent hospitalization for COVID.  He was brought in by ambulance in respiratory distress on CPAP.  He did not improve on CPAP and eventually required BiPAP.  He was treated for approximately 2 hours and evaluated by critical care.  He was deemed stable for admission to medicine.  He was started on IV antibiotics for suspected pneumonia.  He was started on IV steroids for suspected COPD exacerbation.  He denies chest pain, abdominal pain, dizziness or syncope.     Review of Systems   Constitutional:  Negative for fever.   HENT:  Negative for congestion and sore throat.    Eyes:  Negative for pain and visual disturbance.   Respiratory:  Positive for shortness of breath. Negative for cough.    Cardiovascular:  Negative for chest pain.   Gastrointestinal:  Negative for abdominal pain, nausea and vomiting.   Genitourinary:  Negative for dysuria.   Musculoskeletal:  Negative for arthralgias and back pain.   Skin:  Negative for color change.   Neurological:  Negative for dizziness and syncope.   Psychiatric/Behavioral:  Negative for  confusion.    All other systems reviewed and are negative.      I have reviewed the patient's PMH, PSH, Social History, Family History, Meds, and Allergies    Social History:  Marital Status: /[employer] Union   Occupation: Retired  Patient Pre-hospital Living Situation: Home  Patient Pre-hospital Level of Mobility: walks  Patient Pre-hospital Diet Restrictions: Carb controlled    Objective     Vitals:   Blood Pressure: 149/63 (09/29/24 1200)  Pulse: 82 (09/29/24 1425)  Temperature: 98.7 °F (37.1 °C) (09/29/24 1011)  Temp Source: Temporal (09/29/24 1011)  Respirations: 22 (09/29/24 1200)  Height: 6' (182.9 cm) (09/29/24 1245)  Weight - Scale: 71.7 kg (158 lb 2.9 oz) (09/29/24 1245)  SpO2: 95 % (09/29/24 1425)    Physical Exam  Vitals and nursing note reviewed.   Constitutional:       General: He is not in acute distress.  HENT:      Head: Normocephalic and atraumatic.      Nose: Nose normal.      Mouth/Throat:      Mouth: Mucous membranes are moist.      Pharynx: Oropharynx is clear.   Eyes:      Pupils: Pupils are equal, round, and reactive to light.   Cardiovascular:      Rate and Rhythm: Normal rate and regular rhythm.      Pulses: Normal pulses.      Heart sounds: Normal heart sounds.   Pulmonary:      Effort: Pulmonary effort is normal.      Breath sounds: Decreased air movement present. Decreased breath sounds present.   Abdominal:      General: Abdomen is flat. Bowel sounds are normal.      Palpations: Abdomen is soft.   Musculoskeletal:      Right lower leg: No edema.      Left lower leg: No edema.   Skin:     General: Skin is warm and dry.   Neurological:      General: No focal deficit present.      Mental Status: He is alert and oriented to person, place, and time.   Psychiatric:         Mood and Affect: Mood normal.         Behavior: Behavior normal.         Lines/Drains:  Lines/Drains/Airways       Active Status       None                        Additional Data:   Lab Results: I have reviewed the  following results: CBC/BMP:   .     09/29/24  1022   WBC 15.13*   HGB 13.5   HCT 39.8      SODIUM 134*   K 4.3   CL 98   CO2 25   BUN 23   CREATININE 1.07   GLUC 279*      Results from last 7 days   Lab Units 09/29/24  1022   WBC Thousand/uL 15.13*   HEMOGLOBIN g/dL 13.5   HEMATOCRIT % 39.8   PLATELETS Thousands/uL 275   SEGS PCT % 86*   LYMPHO PCT % 9*   MONO PCT % 4   EOS PCT % 0     Results from last 7 days   Lab Units 09/29/24  1022   SODIUM mmol/L 134*   POTASSIUM mmol/L 4.3   CHLORIDE mmol/L 98   CO2 mmol/L 25   BUN mg/dL 23   CREATININE mg/dL 1.07   ANION GAP mmol/L 11   CALCIUM mg/dL 9.3   ALBUMIN g/dL 3.4*   TOTAL BILIRUBIN mg/dL 0.87   ALK PHOS U/L 59   ALT U/L 21   AST U/L 13   GLUCOSE RANDOM mg/dL 279*     Results from last 7 days   Lab Units 09/29/24  1022   INR  1.01     Results from last 7 days   Lab Units 09/26/24  1110 09/26/24  0749 09/25/24  2113 09/25/24  1640 09/25/24  1050 09/25/24  0739 09/24/24  2058 09/24/24  1628 09/24/24  1122 09/24/24  0721 09/24/24  0243   POC GLUCOSE mg/dl 367* 343* 300* 361* 357* 249* 296* 404* 361* 294* 199*     Lab Results   Component Value Date    HGBA1C 9.7 (H) 09/24/2024    HGBA1C 9.4 (H) 06/26/2024    HGBA1C 7.5 (H) 02/23/2024     Results from last 7 days   Lab Units 09/29/24  1406 09/29/24  1031 09/29/24  1022 09/26/24  0443 09/25/24  0457   LACTIC ACID mmol/L 1.3 2.6*  --   --   --    PROCALCITONIN ng/ml  --   --  0.19 1.15* 1.84*       Imaging Review: Reviewed radiology reports from this admission including: chest xray.  Other Studies: EKG was reviewed.     Administrative Statements   I have spent a total time of 45 minutes in caring for this patient on the day of the visit/encounter including Diagnostic results, Prognosis, Risks and benefits of tx options, Instructions for management, Patient and family education, Importance of tx compliance, Risk factor reductions, Impressions, Counseling / Coordination of care, Documenting in the medical record,  Reviewing / ordering tests, medicine, procedures  , Obtaining or reviewing history  , and Communicating with other healthcare professionals .    ** Please Note: This note has been constructed using a voice recognition system. **

## 2024-09-29 NOTE — RESPIRATORY THERAPY NOTE
RT Protocol Note  Gold Van 78 y.o. male MRN: [id number]  Unit/Bed#: -01 Encounter: [id number]    Assessment    Principal Problem:    Acute respiratory failure with hypoxia (HCC)  Active Problems:    Type 2 diabetes mellitus with complication, without long-term current use of insulin (HCC)    Essential hypertension    Sepsis due to pneumonia (HCC)    COPD with asthma    COVID-19      Home Pulmonary Medications:  Breztri, albuterol mdi prn, neb prn       Past Medical History:   Diagnosis Date    Asthma     Diabetes mellitus (HCC)     Environmental allergies     Hyperlipidemia     Hypertension     Macular degeneration 07/13/2020    right eye    Orthostatic hypotension     Osteopenia     Pneumothorax     Sinusitis      Social History     Socioeconomic History    Marital status: /Civil Union     Spouse name: None    Number of children: None    Years of education: None    Highest education level: None   Occupational History    None   Tobacco Use    Smoking status: Former    Smokeless tobacco: Never    Tobacco comments:     quit about 25 years ago   Vaping Use    Vaping status: Never Used   Substance and Sexual Activity    Alcohol use: Never    Drug use: Never    Sexual activity: Yes     Partners: Female   Other Topics Concern    None   Social History Narrative    Daily caffeine use- 1 cup of tea, 2 cups of coffee     Social Determinants of Health     Financial Resource Strain: Not on file   Food Insecurity: No Food Insecurity (9/29/2024)    Hunger Vital Sign     Worried About Running Out of Food in the Last Year: Never true     Ran Out of Food in the Last Year: Never true   Transportation Needs: No Transportation Needs (9/29/2024)    PRAPARE - Transportation     Lack of Transportation (Medical): No     Lack of Transportation (Non-Medical): No   Physical Activity: Not on file   Stress: Not on file   Social Connections: Unknown (6/18/2024)    Received from [facility]    Social [place]     How often do  you feel lonely or isolated from those around you? (Adult - for ages 18 years and over): Not on file   Intimate Partner Violence: Not on file   Housing Stability: Low Risk  (9/29/2024)    Housing Stability Vital Sign     Unable to Pay for Housing in the Last Year: No     Number of Times Moved in the Last Year: 0     Homeless in the Last Year: No       Subjective         Objective    Physical Exam:   Assessment Type: Pre-treatment  General Appearance: Alert, Awake  Respiratory Pattern: Dyspnea with exertion  Chest Assessment: Chest expansion symmetrical  Bilateral Breath Sounds: Diminished  Cough: Non-productive, Moist, Strong  O2 Device: nc    Vitals:  Blood pressure 149/63, pulse 82, temperature 98.7 °F (37.1 °C), temperature source Temporal, resp. rate 22, height 6' (1.829 m), weight 71.7 kg (158 lb 2.9 oz), SpO2 95%.          Imaging and other studies: Reviewed radiology reports from this admission including: chest xray.    O2 Device: nc     Plan    Respiratory Plan: Home Bronchodilator Patient pathway        Resp Comments: Pt. admitted for pneumonia. +covid. Hx of COPD. Pt states he uses breztri, albuterol mdi and nebulizer prn. Pt currently in no resp. distress. BS are diminished. Strong moist NPC. Will continue prn txs at this time and daily inhalers

## 2024-09-29 NOTE — ED PROVIDER NOTES
Final diagnoses:   COPD exacerbation (HCC)   COVID-19   Pneumonia   Sepsis (HCC)   COPD with asthma     ED Disposition       ED Disposition   Admit    Condition   Stable    Date/Time   Sun Sep 29, 2024 11:53 AM    Comment   Case was discussed with Dr. Hurd and the patient's admission status was agreed to be Admission Status: inpatient status to the service of Dr. Hurd .               Assessment & Plan       Medical Decision Making  78-year-old male presents emergency room status post acute shortness of breath.  The patient has a history of COPD and was recently diagnosed with COVID-19.  The patient notes that he was not able to catch his breath today and he had to call 911.  The patient notes that nebulizers were not seemingly helpful today and he is not on chronic home O2.  The patient was seen by medics and CPAP was applied.  The patient noted that he felt better with this.  Patient denies chest pain fever or chills.  Differential diagnosis based on my evaluation is exacerbation of COPD, pneumonia, worsening COVID-19 infection or congestive heart failure.  Lab studies done show elevated white count as well as a chest x-ray that shows some early changes in the left base/left lingula.  The patient was given IV antibiotics and steroids.  Patient was transferred over to BiPAP and did better.  Troponins are reassuring as well as BNP.  Patient was then seen at the bedside by critical care who noted that the patient go to the floor and remove the patient from BiPAP.  Patient appears comfortable on 4 L of O2 at this time.    Amount and/or Complexity of Data Reviewed  Labs: ordered. Decision-making details documented in ED Course.  Radiology: ordered and independent interpretation performed.    Risk  Prescription drug management.  Decision regarding hospitalization.        ED Course as of 09/29/24 1548   Sun Sep 29, 2024   1045 WBC(!): 15.13   1102 LACTIC ACID(!): 2.6   1202 Patient seen by critical care and removed from  BiPAP by critical care.  Patient placed on 4 L of oxygen by nasal cannula, and was noted to be doing well.  Critical care states that the patient can be admitted to the medicine service.       Medications   ipratropium-albuterol (FOR EMS ONLY) (DUO-NEB) 0.5-2.5 mg/3 mL inhalation solution 3 mL (0 mL Does not apply Given to EMS 9/29/24 1023)   methylPREDNISolone sodium succinate (Solu-MEDROL) injection 80 mg (80 mg Intravenous Given 9/29/24 1023)   cefTRIAXone (ROCEPHIN) IVPB (premix in dextrose) 1,000 mg 50 mL (0 mg Intravenous Stopped 9/29/24 1125)   azithromycin (ZITHROMAX) 500 mg in sodium chloride 0.9 % 250 mL IVPB (500 mg Intravenous New Bag 9/29/24 1141)       ED Risk Strat Scores                           SBIRT 22yo+      Flowsheet Row Most Recent Value   Initial Alcohol Screen: US AUDIT-C     1. How often do you have a drink containing alcohol? 0 Filed at: 09/29/2024 1016   2. How many drinks containing alcohol do you have on a typical day you are drinking?  0 Filed at: 09/29/2024 1016   3a. Male UNDER 65: How often do you have five or more drinks on one occasion? 0 Filed at: 09/29/2024 1016   3b. FEMALE Any Age, or MALE 65+: How often do you have 4 or more drinks on one occassion? 0 Filed at: 09/29/2024 1016   Audit-C Score 0 Filed at: 09/29/2024 1016   IMTIAZ: How many times in the past year have you...    Used an illegal drug or used a prescription medication for non-medical reasons? Never Filed at: 09/29/2024 1016                            History of Present Illness       Chief Complaint   Patient presents with    Shortness of Breath     Acute shortness of breath this  morning after dx with covid around a week ago.        Past Medical History:   Diagnosis Date    Asthma     Diabetes mellitus (HCC)     Environmental allergies     Hyperlipidemia     Hypertension     Macular degeneration 07/13/2020    right eye    Orthostatic hypotension     Osteopenia     Pneumothorax     Sinusitis       Past Surgical  History:   Procedure Laterality Date    COLONOSCOPY      KNEE CARTILAGE SURGERY Right 1964    VASECTOMY      WISDOM TOOTH EXTRACTION      x4      Family History   Problem Relation Age of Onset    Asthma Mother     Emphysema Mother     Cancer Father     Asthma Brother     Cancer Brother       Social History     Tobacco Use    Smoking status: Former    Smokeless tobacco: Never    Tobacco comments:     quit about 25 years ago   Vaping Use    Vaping status: Never Used   Substance Use Topics    Alcohol use: Never    Drug use: Never      E-Cigarette/Vaping    E-Cigarette Use Never User       E-Cigarette/Vaping Substances    Nicotine No     THC No     CBD No     Flavoring No     Other No     Unknown No       I have reviewed and agree with the history as documented.     78-year-old male with history of COPD as well as recent COVID-positive status as of the 20th of this month presents to the emergency department complaining of shortness of breath.  The patient was in respiratory distress when he was initially seen with low O2 sats.  EMS placed the patient on CPAP and brought him to the ER.  The patient notes that that is helping him.  He denies any chest pain trauma or fever.  Patient is not O2 dependent at home.  Patient was discharged a few days ago from the hospital.          Review of Systems   Constitutional:  Positive for activity change. Negative for chills and fever.   HENT:  Negative for ear pain and sore throat.    Eyes:  Negative for pain and visual disturbance.   Respiratory:  Positive for cough, chest tightness and shortness of breath.    Cardiovascular:  Negative for chest pain and palpitations.   Gastrointestinal:  Negative for abdominal pain and vomiting.   Genitourinary:  Negative for dysuria and hematuria.   Musculoskeletal:  Negative for arthralgias and back pain.   Skin:  Negative for color change and rash.   Neurological:  Negative for seizures and syncope.   All other systems reviewed and are  negative.          Objective       ED Triage Vitals [09/29/24 1011]   Temperature Pulse Blood Pressure Respirations SpO2 Patient Position - Orthostatic VS   98.7 °F (37.1 °C) (!) 129 135/83 22 (!) 85 % Lying      Temp Source Heart Rate Source BP Location FiO2 (%) Pain Score    Temporal Monitor Left arm -- No Pain      Vitals      Date and Time Temp Pulse SpO2 Resp BP Pain Score FACES Pain Rating User   09/29/24 1536 98 °F (36.7 °C) 75 94 % 16 138/73 -- -- [facility]   09/29/24 1448 -- 79 95 % -- -- -- -- [facility]   09/29/24 1425 -- 82 95 % -- -- -- --    09/29/24 1300 -- -- -- -- -- No Pain --    09/29/24 1200 -- 96 92 % 22 149/63 -- --    09/29/24 1130 -- 94 97 % 22 136/63 -- --    09/29/24 1045 -- 97 96 % 21 127/66 -- -- [facility]   09/29/24 1027 -- -- -- -- -- No Pain -- [facility]   09/29/24 1018 -- -- 97 % -- -- -- -- [facility]   09/29/24 1011 98.7 °F (37.1 °C) 129 85 % 22 135/83 No Pain -- [facility]            Physical Exam  Constitutional:       General: He is not in acute distress.     Appearance: Normal appearance. He is normal weight. He is ill-appearing.   HENT:      Head: Normocephalic and atraumatic.      Right Ear: External ear normal.      Left Ear: External ear normal.      Nose: Nose normal.      Mouth/Throat:      Mouth: Mucous membranes are moist.   Eyes:      Conjunctiva/sclera: Conjunctivae normal.   Cardiovascular:      Rate and Rhythm: Normal rate and regular rhythm.      Pulses: Normal pulses.      Heart sounds: Normal heart sounds.   Pulmonary:      Effort: Pulmonary effort is normal.      Breath sounds: Examination of the right-upper field reveals decreased breath sounds. Examination of the left-upper field reveals decreased breath sounds. Examination of the right-middle field reveals decreased breath sounds. Examination of the left-middle field reveals decreased breath sounds. Examination of the right-lower field reveals decreased breath sounds and rhonchi. Examination of the left-lower field reveals decreased breath  sounds and rhonchi. Decreased breath sounds and rhonchi present.   Chest:      Chest wall: No mass or deformity.   Abdominal:      General: Abdomen is flat. There is no distension.      Palpations: Abdomen is soft. There is no mass.   Musculoskeletal:         General: No swelling, tenderness or deformity. Normal range of motion.      Cervical back: Normal range of motion.      Right lower leg: No edema.      Left lower leg: No edema.   Skin:     General: Skin is warm and dry.      Capillary Refill: Capillary refill takes 2 to 3 seconds.      Coloration: Skin is not pale.   Neurological:      General: No focal deficit present.      Mental Status: He is alert and oriented to person, place, and time. Mental status is at baseline.   Psychiatric:         Mood and Affect: Mood normal.         Results Reviewed       Procedure Component Value Units Date/Time    HS Troponin I 4hr [[id number]]  (Normal) Collected: 09/29/24 1406    Lab Status: Final result Specimen: Blood from Arm, Left Updated: 09/29/24 1448     hs TnI 4hr 9 ng/L      Delta 4hr hsTnI -1 ng/L     Lactic acid 2 Hours [[id number]]  (Normal) Collected: 09/29/24 1406    Lab Status: Final result Specimen: Blood from Arm, Left Updated: 09/29/24 1441     LACTIC ACID 1.3 mmol/L     Narrative:      Result may be elevated if tourniquet was used during collection.    Blood culture #1 [[id number]] Collected: 09/29/24 1022    Lab Status: Preliminary result Specimen: Blood from Arm, Right Updated: 09/29/24 1401     Blood Culture Received in Microbiology Lab. Culture in Progress.    Blood culture #2 [[id number]] Collected: 09/29/24 1022    Lab Status: Preliminary result Specimen: Blood from Arm, Left Updated: 09/29/24 1401     Blood Culture Received in Microbiology Lab. Culture in Progress.    HS Troponin I 2hr [[id number]]  (Normal) Collected: 09/29/24 1224    Lab Status: Final result Specimen: Blood from Arm, Right Updated: 09/29/24 1256     hs TnI 2hr 9 ng/L      Delta 2hr  hsTnI -1 ng/L     Procalcitonin [[id number]]  (Normal) Collected: 09/29/24 1022    Lab Status: Final result Specimen: Blood from Arm, Right Updated: 09/29/24 1118     Procalcitonin 0.19 ng/ml     Lactic acid, plasma (w/reflex if result > 2.0) [[id number]]  (Abnormal) Collected: 09/29/24 1031    Lab Status: Final result Specimen: Blood from Arm, Right Updated: 09/29/24 1100     LACTIC ACID 2.6 mmol/L     Narrative:      Result may be elevated if tourniquet was used during collection.    HS Troponin 0hr (reflex protocol) [[id number]]  (Normal) Collected: 09/29/24 1022    Lab Status: Final result Specimen: Blood from Arm, Right Updated: 09/29/24 1059     hs TnI 0hr 10 ng/L     B-Type Natriuretic Peptide(BNP) [[id number]]  (Abnormal) Collected: 09/29/24 1022    Lab Status: Final result Specimen: Blood from Arm, Right Updated: 09/29/24 1058      pg/mL     Blood gas, venous [[id number]]  (Abnormal) Collected: 09/29/24 1051    Lab Status: Final result Specimen: Blood Updated: 09/29/24 1054     pH, Yann 7.388     pCO2, Yann 40.1 mm Hg      pO2, Yann 49.7 mm Hg      HCO3, Yann 23.6 mmol/L      Base Excess, Yann -1.2 mmol/L      O2 Content, Yann 16.5 ml/dL      O2 HGB, VENOUS 82.7 %     Comprehensive metabolic panel [[id number]]  (Abnormal) Collected: 09/29/24 1022    Lab Status: Final result Specimen: Blood from Arm, Right Updated: 09/29/24 1054     Sodium 134 mmol/L      Potassium 4.3 mmol/L      Chloride 98 mmol/L      CO2 25 mmol/L      ANION GAP 11 mmol/L      BUN 23 mg/dL      Creatinine 1.07 mg/dL      Glucose 279 mg/dL      Calcium 9.3 mg/dL      Corrected Calcium 9.8 mg/dL      AST 13 U/L      ALT 21 U/L      Alkaline Phosphatase 59 U/L      Total Protein 7.0 g/dL      Albumin 3.4 g/dL      Total Bilirubin 0.87 mg/dL      eGFR 66 ml/min/1.73sq m     Narrative:      National Kid[employer] Disease Foundation guidelines for Chronic Kidney Disease (CKD):     Stage 1 with normal or high GFR (GFR > 90 mL/min/1.73 square meters)     Stage 2 Mild CKD (GFR = 60-89 mL/min/1.73 square meters)    Stage 3A Moderate CKD (GFR = 45-59 mL/min/1.73 square meters)    Stage 3B Moderate CKD (GFR = 30-44 mL/min/1.73 square meters)    Stage 4 Severe CKD (GFR = 15-29 mL/min/1.73 square meters)    Stage 5 End Stage CKD (GFR <15 mL/min/1.73 square meters)  Note: GFR calculation is accurate only with a steady state creatinine    Protime-INR [[id number]]  (Normal) Collected: 09/29/24 1022    Lab Status: Final result Specimen: Blood from Arm, Right Updated: 09/29/24 1049     Protime 13.8 seconds      INR 1.01    Narrative:      INR Therapeutic Range    Indication                                             INR Range      Atrial Fibrillation                                               2.0-3.0  Hypercoagulable State                                    2.0.2.3  Left Ventricular Asist Device                            2.0-3.0  Mechanical Heart Valve                                  -    Aortic(with afib, MI, embolism, HF, LA enlargement,    and/or coagulopathy)                                     2.0-3.0 (2.5-3.5)     Mitral                                                             2.5-3.5  Prosthetic/Bioprosthetic Heart Valve               2.0-3.0  Venous thromboembolism (VTE: VT, PE        2.0-3.0    APTT [[id number]]  (Abnormal) Collected: 09/29/24 1022    Lab Status: Final result Specimen: Blood from Arm, Right Updated: 09/29/24 1049     PTT 22 seconds     CBC and differential [[id number]]  (Abnormal) Collected: 09/29/24 1022    Lab Status: Final result Specimen: Blood from Arm, Right Updated: 09/29/24 1044     WBC 15.13 Thousand/uL      RBC 4.45 Million/uL      Hemoglobin 13.5 g/dL      Hematocrit 39.8 %      MCV 89 fL      MCH 30.3 pg      MCHC 33.9 g/dL      RDW 13.2 %      MPV 9.8 fL      Platelets 275 Thousands/uL      nRBC 0 /100 WBCs      Segmented % 86 %      Immature Grans % 1 %      Lymphocytes % 9 %      Monocytes % 4 %      Eosinophils Relative 0 %       Basophils Relative 0 %      Absolute Neutrophils 13.02 Thousands/µL      Absolute Immature Grans 0.13 Thousand/uL      Absolute Lymphocytes 1.39 Thousands/µL      Absolute Monocytes 0.56 Thousand/µL      Eosinophils Absolute 0.01 Thousand/µL      Basophils Absolute 0.02 Thousands/µL             XR chest 1 view portable   ED Interpretation by Anand Stevens Jr., DO (09/29 1046)   Presumed left lingular infiltrate.          ECG 12 Lead Documentation Only    Date/Time: 9/29/2024 10:16 AM    Performed by: Anand Stevens Jr., DO  Authorized by: Anand Stevens Jr., DO    ECG reviewed by me, the ED Provider: yes    Patient location:  ED  Comments:      EKG shows what appears to be multifocal atrial tachycardia at 123 beats a minute.  There is a normal axis.  There may be some nonspecific ST segment changes in the inferior leads which could be ischemic in nature however there is a lot of baseline artifact.      ED Medication and Procedure Management   Prior to Admission Medications   Prescriptions Last Dose Informant Patient Reported? Taking?   Budeson-Glycopyrrol-Formoterol (Breztri Aerosphere) 160-9-4.8 MCG/ACT AERO 9/29/2024  Yes Yes   Sig: Take 2 puffs by mouth Every 12 hours   LORazepam (ATIVAN) 0.5 mg tablet Past Month Self Yes Yes   Sig: Take 0.5 mg by mouth daily at bedtime as needed   albuterol (ACCUNEB) 1.25 MG/3ML nebulizer solution 9/28/2024  Yes Yes   Sig: Take 1.25 mg by nebulization every 6 (six) hours as needed for wheezing   albuterol (PROVENTIL HFA,VENTOLIN HFA) 90 mcg/act inhaler 9/28/2024 Self Yes Yes   Sig: Ventolin HFA 90 mcg/actuation aerosol inhaler   amLODIPine (NORVASC) 5 mg tablet 9/28/2024 Self Yes Yes   Sig: Take 5 mg by mouth 2 (two) times a day   atenolol (TENORMIN) 25 mg tablet 9/28/2024 Self Yes Yes   Sig: Take 25 mg by mouth daily   benzonatate (TESSALON PERLES) 100 mg capsule 9/28/2024 at 2300  No Yes   Sig: Take 1 capsule (100 mg total) by mouth 3 (three) times a day as needed  for cough   famotidine (PEPCID) 20 mg tablet 9/28/2024 Self No Yes   Sig: Take 1 tablet (20 mg total) by mouth daily   Patient taking differently: Take 20 mg by mouth 2 (two) times a day   glipiZIDE (GLUCOTROL XL) 2.5 mg 24 hr tablet 9/28/2024 Self Yes Yes   Sig: Take 2.5 mg by mouth 2 (two) times a day   ipratropium (ATROVENT) 0.02 % nebulizer solution Unknown  Yes No   Sig: Take 0.5 mg by nebulization 4 (four) times a day   lisinopril (ZESTRIL) 20 mg tablet 9/28/2024 Self Yes Yes   Sig: Take 20 mg by mouth 2 (two) times a day   metFORMIN (GLUCOPHAGE) 500 mg tablet 9/28/2024 Self, Spouse/Significant Other Yes Yes   Sig: Take 500 mg by mouth 2 (two) times a day with meals 750 MG ONCE A DAY AT DINNER   montelukast (SINGULAIR) 10 mg tablet 9/28/2024 Self Yes Yes   Sig: Take 10 mg by mouth in the morning   predniSONE 20 mg tablet 9/28/2024  No Yes   Sig: Take 2 tablets (40 mg total) by mouth daily for 3 days Starting on 9/27/24   predniSONE 5 mg tablet 9/28/2024 Spouse/Significant Other, Self No Yes   Sig: Take 1 tablet (5 mg total) by mouth daily ALTERNATES 5MG AND 15MG EOD Do not start before September 30, 2024.   Patient taking differently: Take 5 mg by mouth daily ALTERNATES 10MG AND 15MG EOD Do not start before September 30, 2024.      Facility-Administered Medications: None     Current Discharge Medication List        CONTINUE these medications which have NOT CHANGED    Details   albuterol (ACCUNEB) 1.25 MG/3ML nebulizer solution Take 1.25 mg by nebulization every 6 (six) hours as needed for wheezing      albuterol (PROVENTIL HFA,VENTOLIN HFA) 90 mcg/act inhaler Ventolin HFA 90 mcg/actuation aerosol inhaler      amLODIPine (NORVASC) 5 mg tablet Take 5 mg by mouth 2 (two) times a day      atenolol (TENORMIN) 25 mg tablet Take 25 mg by mouth daily      benzonatate (TESSALON PERLES) 100 mg capsule Take 1 capsule (100 mg total) by mouth 3 (three) times a day as needed for cough  Qty: 20 capsule, Refills: 0     Associated Diagnoses: Pneumothorax; Bronchitis      Budeson-Glycopyrrol-Formoterol (Breztri Aerosphere) 160-9-4.8 MCG/ACT AERO Take 2 puffs by mouth Every 12 hours      famotidine (PEPCID) 20 mg tablet Take 1 tablet (20 mg total) by mouth daily  Refills: 0    Associated Diagnoses: GERD (gastroesophageal reflux disease)      glipiZIDE (GLUCOTROL XL) 2.5 mg 24 hr tablet Take 2.5 mg by mouth 2 (two) times a day      lisinopril (ZESTRIL) 20 mg tablet Take 20 mg by mouth 2 (two) times a day      LORazepam (ATIVAN) 0.5 mg tablet Take 0.5 mg by mouth daily at bedtime as needed      metFORMIN (GLUCOPHAGE) 500 mg tablet Take 500 mg by mouth 2 (two) times a day with meals 750 MG ONCE A DAY AT DINNER      montelukast (SINGULAIR) 10 mg tablet Take 10 mg by mouth in the morning      !! predniSONE 20 mg tablet Take 2 tablets (40 mg total) by mouth daily for 3 days Starting on 9/27/24  Qty: 6 tablet, Refills: 0    Associated Diagnoses: COVID      !! predniSONE 5 mg tablet Take 1 tablet (5 mg total) by mouth daily ALTERNATES 5MG AND 15MG EOD Do not start before September 30, 2024.    Associated Diagnoses: COVID      ipratropium (ATROVENT) 0.02 % nebulizer solution Take 0.5 mg by nebulization 4 (four) times a day       !! - Potential duplicate medications found. Please discuss with provider.        No discharge procedures on file.  ED SEPSIS DOCUMENTATION   Time reflects when diagnosis was documented in both MDM as applicable and the Disposition within this note       Time User Action Codes Description Comment    9/29/2024 12:01 PM Anand Stevens [J44.1] COPD exacerbation (HCC)     9/29/2024 12:01 PM Anand Stevens [U07.1] COVID-19     9/29/2024 12:01 PM Anand Stevens [J18.9] Pneumonia     9/29/2024 12:01 PM Anand Stevens [A41.9] Sepsis (HCC)     9/29/2024  2:14 PM Molly Anderson [J44.89] COPD with asthma                  Anand Stevens Jr., DO  09/29/24 1545

## 2024-09-29 NOTE — ASSESSMENT & PLAN NOTE
Arrived in suspected COPD exacerbation/pneumonia on CPAP, later required BiPAP, now on nasal cannula oxygen  Treat suspected underlying cause of COPD exacerbation and suspected pneumonia  Monitor oxygen requirements and wean as tolerated

## 2024-09-29 NOTE — RESPIRATORY THERAPY NOTE
Resp care   09/29/24 1018   Respiratory Assessment   Resp Comments Pt arrived via EMS on cpap. Pt placed on bipap at this time.Settings titrated.Pt resting comfortably.   O2 Device bipap   Non-Invasive Information   O2 Interface Device Full face mask   Non-Invasive Ventilation Mode BiPAP   $ Continous NIV Initial   SpO2 97 %   $ Pulse Oximetry Spot Check Charge Completed   Non-Invasive Settings   IPAP (cm) 12 cm   EPAP (cm) 6 cm   Rate (Set) 12   FiO2 (%) 60   Rise Time 3   Inspiratory Time (Set) 1.25   Non-Invasive Readings   Skin Intervention Skin intact;Skin barrier applied   Total Rate 31   MV (Mech) 23.5   Peak Pressure (Obs) 12   Spontaneous Vt (mL) 678   Leak (lpm) 11   Non-Invasive Alarms   Insp Pressure High (cm H20) 40   Insp Pressure Low (cm H20) 5   Low Insp Pressure Time (sec) 20 sec   MV Low (L/min) 3   Vt High (mL) 1500   Vt Low (mL) 200   High Resp Rate (BPM) 40 BPM   Low Resp Rate (BPM) 8 BPM   Apnea Interval (sec) 20

## 2024-09-29 NOTE — PLAN OF CARE
Problem: Potential for Falls  Goal: Patient will remain free of falls  Description: INTERVENTIONS:  - Educate patient/family on patient safety including physical limitations  - Instruct patient to call for assistance with activity   - Consult OT/PT to assist with strengthening/mobility   - Keep Call bell within reach  - Keep bed low and locked with side rails adjusted as appropriate  - Keep care items and personal belongings within reach  - Initiate and maintain comfort rounds  - Make Fall Risk Sign visible to staff  - Offer Toileting every 4 Hours, in advance of need  - Initiate/Maintain bed alarm  - Obtain necessary fall risk management equipment: yellow socks   - Apply yellow socks and bracelet for high fall risk patients  - Consider moving patient to room near nurses station  Outcome: Progressing     Problem: PAIN - ADULT  Goal: Verbalizes/displays adequate comfort level or baseline comfort level  Description: Interventions:  - Encourage patient to monitor pain and request assistance  - Assess pain using appropriate pain scale  - Administer analgesics based on type and severity of pain and evaluate response  - Implement non-pharmacological measures as appropriate and evaluate response  - Consider cultural and social influences on pain and pain management  - Notify physician/advanced practitioner if interventions unsuccessful or patient reports new pain  Outcome: Progressing     Problem: INFECTION - ADULT  Goal: Absence or prevention of progression during hospitalization  Description: INTERVENTIONS:  - Assess and monitor for signs and symptoms of infection  - Monitor lab/diagnostic results  - Monitor all insertion sites, i.e. indwelling lines, tubes, and drains  - Monitor endotracheal if appropriate and nasal secretions for changes in amount and color  - [place] appropriate cooling/warming therapies per order  - Administer medications as ordered  - Instruct and encourage patient and family to use good hand  hygiene technique  - Identify and instruct in appropriate isolation precautions for identified infection/condition  Outcome: Progressing  Goal: Absence of fever/infection during neutropenic period  Description: INTERVENTIONS:  - Monitor WBC    Outcome: Progressing     Problem: SAFETY ADULT  Goal: Patient will remain free of falls  Description: INTERVENTIONS:  - Educate patient/family on patient safety including physical limitations  - Instruct patient to call for assistance with activity   - Consult OT/PT to assist with strengthening/mobility   - Keep Call bell within reach  - Keep bed low and locked with side rails adjusted as appropriate  - Keep care items and personal belongings within reach  - Initiate and maintain comfort rounds  - Make Fall Risk Sign visible to staff  - Offer Toileting every 4 Hours, in advance of need  - Initiate/Maintain bed alarm  - Obtain necessary fall risk management equipment: yellow socks   - Apply yellow socks and bracelet for high fall risk patients  - Consider moving patient to room near nurses station  Outcome: Progressing  Goal: Maintain or return to baseline ADL function  Description: INTERVENTIONS:  -  Assess patient's ability to carry out ADLs; assess patient's baseline for ADL function and identify physical deficits which impact ability to perform ADLs (bathing, care of mouth/teeth, toileting, grooming, dressing, etc.)  - Assess/evaluate cause of self-care deficits   - Assess range of motion  - Assess patient's mobility; develop plan if impaired  - Assess patient's need for assistive devices and provide as appropriate  - Encourage maximum independence but intervene and supervise when necessary  - Involve family in performance of ADLs  - Assess for home care needs following discharge   - Consider OT consult to assist with ADL evaluation and planning for discharge  - Provide patient education as appropriate  Outcome: Progressing  Goal: Maintains/Returns to pre admission  functional level  Description: INTERVENTIONS:  - Perform AM-PAC 6 Click Basic Mobility/ Daily Activity assessment daily.  - Set and communicate daily mobility goal to care team and patient/family/caregiver.   - Collaborate with rehabilitation services on mobility goals if consulted  - Perform Range of Motion 3 times a day.  - Reposition patient every 2 hours.  - Dangle patient 3 times a day  - Stand patient 3 times a day  - Ambulate patient 3 times a day  - Out of bed to chair 3 times a day   - Out of bed for meals 3 times a day  - Out of bed for toileting  - Record patient progress and toleration of activity level   Outcome: Progressing

## 2024-09-29 NOTE — QUICK NOTE
Progress Note - Triage Asssessment   Gold Van 78 y.o. male MRN: [id number]    Time Called ( Time): 1100  Date Called: 09/29/24  Room#: ED 23  Person requesting evaluation: Dr. Rodney Hedrick    Situation:    Pt with AC hypoxic resp failure on BIPAP. Hx COPD and recent COVID. CXR now with Lt sided opacities concerning for PNA    Interventions:   During our exam, pt call and no WOB on bipap, holding a conversation. Placed on 4L NC. Pt remained with no distress, SpO2 97%.          Triage Assessment:     Patient can be admitted to med-surg level of care    Recommendations discussed with

## 2024-09-29 NOTE — PLAN OF CARE

## 2024-09-29 NOTE — ASSESSMENT & PLAN NOTE
Takes prednisone chronically 10 mg alternating with 15 mg.  Oral steroids on hold while receiving IV Solu-Medrol

## 2024-09-29 NOTE — ASSESSMENT & PLAN NOTE
Suspected COPD exacerbation, lung sounds decreased on exam  Arrived to ER on CPAP, transferred over to [facility] for 2 hours .  Received IV steroids while in the ER   Evaluated by critical care who felt that patient is stable for medical admission  Continue Solu-Medrol 40 mg IV TID for now  Oxygen and respiratory protocol  Continue nebulized medication  Start Mucinex, continue Singulair  Conduct serial physical assessments

## 2024-09-30 LAB
ANION GAP SERPL CALCULATED.3IONS-SCNC: 10 MMOL/L (ref 4–13)
BASOPHILS # BLD MANUAL: 0 THOUSAND/UL (ref 0–0.1)
BASOPHILS NFR MAR MANUAL: 0 % (ref 0–1)
BUN SERPL-MCNC: 23 MG/DL (ref 5–25)
CALCIUM SERPL-MCNC: 8.5 MG/DL (ref 8.4–10.2)
CHLORIDE SERPL-SCNC: 102 MMOL/L (ref 96–108)
CO2 SERPL-SCNC: 23 MMOL/L (ref 21–32)
CREAT SERPL-MCNC: 0.85 MG/DL (ref 0.6–1.3)
EOSINOPHIL # BLD MANUAL: 0 THOUSAND/UL (ref 0–0.4)
EOSINOPHIL NFR BLD MANUAL: 0 % (ref 0–6)
ERYTHROCYTE [DISTWIDTH] IN BLOOD BY AUTOMATED COUNT: 13.3 % (ref 11.6–15.1)
GFR SERPL CREATININE-BSD FRML MDRD: 83 ML/MIN/1.73SQ M
GLUCOSE SERPL-MCNC: 217 MG/DL (ref 65–140)
GLUCOSE SERPL-MCNC: 240 MG/DL (ref 65–140)
GLUCOSE SERPL-MCNC: 282 MG/DL (ref 65–140)
GLUCOSE SERPL-MCNC: 387 MG/DL (ref 65–140)
GLUCOSE SERPL-MCNC: 401 MG/DL (ref 65–140)
GLUCOSE SERPL-MCNC: 442 MG/DL (ref 65–140)
GLUCOSE SERPL-MCNC: 446 MG/DL (ref 65–140)
HCT VFR BLD AUTO: 34.5 % (ref 36.5–49.3)
HGB BLD-MCNC: 11.5 G/DL (ref 12–17)
LYMPHOCYTES # BLD AUTO: 0.55 THOUSAND/UL (ref 0.6–4.47)
LYMPHOCYTES # BLD AUTO: 5 % (ref 14–44)
MCH RBC QN AUTO: 30.2 PG (ref 26.8–34.3)
MCHC RBC AUTO-ENTMCNC: 33.3 G/DL (ref 31.4–37.4)
MCV RBC AUTO: 91 FL (ref 82–98)
MONOCYTES # BLD AUTO: 0.22 THOUSAND/UL (ref 0–1.22)
MONOCYTES NFR BLD: 2 % (ref 4–12)
NEUTROPHILS # BLD MANUAL: 10.3 THOUSAND/UL (ref 1.85–7.62)
NEUTS SEG NFR BLD AUTO: 93 % (ref 43–75)
PLATELET # BLD AUTO: 223 THOUSANDS/UL (ref 149–390)
PLATELET BLD QL SMEAR: ADEQUATE
PMV BLD AUTO: 9.8 FL (ref 8.9–12.7)
POTASSIUM SERPL-SCNC: 4.3 MMOL/L (ref 3.5–5.3)
RBC # BLD AUTO: 3.81 MILLION/UL (ref 3.88–5.62)
RBC MORPH BLD: NORMAL
SODIUM SERPL-SCNC: 135 MMOL/L (ref 135–147)
WBC # BLD AUTO: 11.07 THOUSAND/UL (ref 4.31–10.16)

## 2024-09-30 PROCEDURE — 82947 ASSAY GLUCOSE BLOOD QUANT: CPT | Performed by: NURSE PRACTITIONER

## 2024-09-30 PROCEDURE — 85007 BL SMEAR W/DIFF WBC COUNT: CPT | Performed by: NURSE PRACTITIONER

## 2024-09-30 PROCEDURE — 94760 N-INVAS EAR/PLS OXIMETRY 1: CPT

## 2024-09-30 PROCEDURE — 82948 REAGENT STRIP/BLOOD GLUCOSE: CPT

## 2024-09-30 PROCEDURE — 94640 AIRWAY INHALATION TREATMENT: CPT

## 2024-09-30 PROCEDURE — 99233 SBSQ HOSP IP/OBS HIGH 50: CPT | Performed by: INTERNAL MEDICINE

## 2024-09-30 PROCEDURE — 85027 COMPLETE CBC AUTOMATED: CPT | Performed by: NURSE PRACTITIONER

## 2024-09-30 PROCEDURE — 80048 BASIC METABOLIC PNL TOTAL CA: CPT | Performed by: NURSE PRACTITIONER

## 2024-09-30 PROCEDURE — 99223 1ST HOSP IP/OBS HIGH 75: CPT | Performed by: INTERNAL MEDICINE

## 2024-09-30 RX ORDER — INSULIN LISPRO 100 [IU]/ML
2-12 INJECTION, SOLUTION INTRAVENOUS; SUBCUTANEOUS
Status: DISCONTINUED | OUTPATIENT
Start: 2024-09-30 | End: 2024-10-04 | Stop reason: HOSPADM

## 2024-09-30 RX ORDER — LEVALBUTEROL INHALATION SOLUTION 1.25 MG/3ML
1.25 SOLUTION RESPIRATORY (INHALATION)
Status: DISCONTINUED | OUTPATIENT
Start: 2024-09-30 | End: 2024-10-04 | Stop reason: HOSPADM

## 2024-09-30 RX ORDER — INSULIN LISPRO 100 [IU]/ML
6 INJECTION, SOLUTION INTRAVENOUS; SUBCUTANEOUS ONCE
Status: COMPLETED | OUTPATIENT
Start: 2024-09-30 | End: 2024-09-30

## 2024-09-30 RX ORDER — METHYLPREDNISOLONE SODIUM SUCCINATE 40 MG/ML
40 INJECTION, POWDER, LYOPHILIZED, FOR SOLUTION INTRAMUSCULAR; INTRAVENOUS EVERY 12 HOURS SCHEDULED
Status: DISCONTINUED | OUTPATIENT
Start: 2024-10-01 | End: 2024-10-01

## 2024-09-30 RX ORDER — AZITHROMYCIN 250 MG/1
500 TABLET, FILM COATED ORAL EVERY 24 HOURS
Status: DISCONTINUED | OUTPATIENT
Start: 2024-09-30 | End: 2024-10-02

## 2024-09-30 RX ORDER — SODIUM CHLORIDE FOR INHALATION 3 %
4 VIAL, NEBULIZER (ML) INHALATION
Status: DISCONTINUED | OUTPATIENT
Start: 2024-09-30 | End: 2024-10-04 | Stop reason: HOSPADM

## 2024-09-30 RX ADMIN — ENOXAPARIN SODIUM 40 MG: 40 INJECTION SUBCUTANEOUS at 08:05

## 2024-09-30 RX ADMIN — CEFTRIAXONE 1000 MG: 1 INJECTION, SOLUTION INTRAVENOUS at 12:16

## 2024-09-30 RX ADMIN — METHYLPREDNISOLONE SODIUM SUCCINATE 40 MG: 40 INJECTION, POWDER, FOR SOLUTION INTRAMUSCULAR; INTRAVENOUS at 01:54

## 2024-09-30 RX ADMIN — METHYLPREDNISOLONE SODIUM SUCCINATE 40 MG: 40 INJECTION, POWDER, FOR SOLUTION INTRAMUSCULAR; INTRAVENOUS at 12:07

## 2024-09-30 RX ADMIN — FAMOTIDINE 20 MG: 20 TABLET, FILM COATED ORAL at 17:35

## 2024-09-30 RX ADMIN — AZITHROMYCIN DIHYDRATE 500 MG: 250 TABLET ORAL at 12:03

## 2024-09-30 RX ADMIN — SODIUM CHLORIDE SOLN NEBU 3% 4 ML: 3 NEBU SOLN at 19:58

## 2024-09-30 RX ADMIN — INSULIN LISPRO 6 UNITS: 100 INJECTION, SOLUTION INTRAVENOUS; SUBCUTANEOUS at 21:35

## 2024-09-30 RX ADMIN — INSULIN LISPRO 4 UNITS: 100 INJECTION, SOLUTION INTRAVENOUS; SUBCUTANEOUS at 12:04

## 2024-09-30 RX ADMIN — AMLODIPINE BESYLATE 5 MG: 5 TABLET ORAL at 20:25

## 2024-09-30 RX ADMIN — GUAIFENESIN 600 MG: 600 TABLET ORAL at 08:04

## 2024-09-30 RX ADMIN — GUAIFENESIN 600 MG: 600 TABLET ORAL at 17:35

## 2024-09-30 RX ADMIN — LISINOPRIL 20 MG: 20 TABLET ORAL at 08:04

## 2024-09-30 RX ADMIN — ATENOLOL 25 MG: 25 TABLET ORAL at 08:04

## 2024-09-30 RX ADMIN — LISINOPRIL 20 MG: 20 TABLET ORAL at 20:25

## 2024-09-30 RX ADMIN — INSULIN LISPRO 2 UNITS: 100 INJECTION, SOLUTION INTRAVENOUS; SUBCUTANEOUS at 07:57

## 2024-09-30 RX ADMIN — LEVALBUTEROL HYDROCHLORIDE 1.25 MG: 1.25 SOLUTION RESPIRATORY (INHALATION) at 19:58

## 2024-09-30 RX ADMIN — FAMOTIDINE 20 MG: 20 TABLET, FILM COATED ORAL at 08:03

## 2024-09-30 RX ADMIN — INSULIN GLARGINE 10 UNITS: 100 INJECTION, SOLUTION SUBCUTANEOUS at 21:34

## 2024-09-30 RX ADMIN — BUDESONIDE AND FORMOTEROL FUMARATE DIHYDRATE 2 PUFF: 160; 4.5 AEROSOL RESPIRATORY (INHALATION) at 08:06

## 2024-09-30 RX ADMIN — AMLODIPINE BESYLATE 5 MG: 5 TABLET ORAL at 08:04

## 2024-09-30 RX ADMIN — MONTELUKAST 10 MG: 10 TABLET, FILM COATED ORAL at 21:34

## 2024-09-30 RX ADMIN — INSULIN LISPRO 6 UNITS: 100 INJECTION, SOLUTION INTRAVENOUS; SUBCUTANEOUS at 17:42

## 2024-09-30 RX ADMIN — INSULIN LISPRO 12 UNITS: 100 INJECTION, SOLUTION INTRAVENOUS; SUBCUTANEOUS at 17:41

## 2024-09-30 NOTE — UTILIZATION REVIEW
Initial Clinical Review    Admission: Date/Time/Statement:   Admission Orders (From admission, onward)       Ordered        09/29/24 1154  INPATIENT ADMISSION  Once                          Orders Placed This Encounter   Procedures    INPATIENT ADMISSION     Standing Status:   Standing     Number of Occurrences:   1     Order Specific Question:   Level of Care     Answer:   Med Surg [16]     Order Specific Question:   Estimated length of stay     Answer:   More than 2 Midnights     Order Specific Question:   Certification     Answer:   I certify that inpatient services are medically necessary for this patient for a duration of greater than two midnights. See H&P and MD Progress Notes for additional information about the patient's course of treatment.     ED Arrival Information       Expected   -    Arrival   9/29/2024 10:10    Acuity   Emergent              Means of arrival   Ambulance    Escorted by   [facility]    Service   Hospitalist    Admission type   Emergency              Arrival complaint   SOB             Chief Complaint   Patient presents with    Shortness of Breath     Acute shortness of breath this  morning after dx with covid around a week ago.        Initial Presentation: 78 y.o. male to the ED from home via EMS with complaints of shortness of breath.  Admitted to inpatient for acute respiratory failure with hypoxia, COPD with asthma. H/O COPD with asthma, COVID infection, essential hypertension, non-insulin-dependent type 2 diabetes, pneumothorax, chronic steroid use. He is not on home o2.  Arrives with tachycardia, pulse ox 85% on room air. Placed on BIpap. Wbcs 15.13, lactic acid 2.6. in the ED, started on IV steroids, iv abx, nebulizers. Decreased breath sounds and rhonchi heard in lungs.  Cap refill 2-3 seconds. Weaned to 4 LNC.Evaluated by CRITICAL care while in the ED. Okay to go to med/surg.  Blood cultures pending.  Day 9 of COVID infection. REcently hospitalized from 9/24-9/26for  COVID, s/p remdesivir and barictinib, steroids.     Anticipated Length of Stay/Certification Statement: Patient will be admitted on an inpatient basis with an anticipated length of stay of greater than 2 midnights secondary to acute respiratory failure, COPD exacerbation, sepsis, pneumonia.       Date: 9/30   Day 2:Check ECHO, decreased iv steroids.  Continue nebulizers. Lungs with decreased breath sounds.  Right hand with strong grasp equal to left hand. Bilateral lower extremities with strength 5/5. No facial droop, no dysarthria, no paresthesias. Pt/OT eval recommends post SNF.      PUlmonary consult; Acute hypoxic respiratory failure-weaned to room air now, seems pneumonia/postviral vs community-acquired vs new myocardial dysfunction/CHF event uptrending BNP.+ Blood cultures-GPC/ seems contaminant. Continue with IV abx, steroids, check TTE, sputum cultures.      Date: 10/1  Day 3: Has surpassed a 2nd midnight with active treatments and services.  Initially admitted for respiratory failure in patient with COPD, started on iv steroids, nebulizers, concern for pneumoniz with tachycardia, leukocytosis, started on iv abx. Continue with iv abx.  Seen by PT today who noted dysemetria.  Check CTA head/neck, mri. Started asa, plavix, tele. GCs 15.    As per NEurology: CTA head/neck and MRI brain were personally reviewed - significant, diffuse intracranial atherosclerotic disease with severe stenosis in b/l ICAs and b/l verts along with moderate stenosis in basilar; extracranial disease with moderate b/l proximal ICA disease; multifocal bihemispheric (b/l frontoparietal) infarcts noted with at least some appearing watershed. TTE was unremarkable. Continue asa, plavix.   ED Treatment-Medication Administration from 09/29/2024 1010 to 09/29/2024 1242         Date/Time Order Dose Route Action     09/29/2024 1023 ipratropium-albuterol (FOR EMS ONLY) (DUO-NEB) 0.5-2.5 mg/3 mL inhalation solution 3 mL 0 mL Does not apply Given  to EMS     09/29/2024 1023 methylPREDNISolone sodium succinate (Solu-MEDROL) injection 80 mg 80 mg Intravenous Given     09/29/2024 1055 cefTRIAXone (ROCEPHIN) IVPB (premix in dextrose) 1,000 mg 50 mL 1,000 mg Intravenous New Bag     09/29/2024 1141 azithromycin (ZITHROMAX) 500 mg in sodium chloride 0.9 % 250 mL IVPB 500 mg Intravenous New Bag            Scheduled Medications:  amLODIPine, 5 mg, Oral, BID  atenolol, 25 mg, Oral, Daily  azithromycin, 500 mg, Oral, Q24H  budesonide-formoterol, 2 puff, Inhalation, BID  cefTRIAXone, 1,000 mg, Intravenous, Q24H  enoxaparin, 40 mg, Subcutaneous, Daily  famotidine, 20 mg, Oral, BID  guaiFENesin, 600 mg, Oral, BID  insulin glargine, 10 Units, Subcutaneous, HS  insulin lispro, 1-5 Units, Subcutaneous, TID AC  insulin lispro, 1-5 Units, Subcutaneous, HS  lisinopril, 20 mg, Oral, BID  methylPREDNISolone sodium succinate, 40 mg, Intravenous, Q8H  montelukast, 10 mg, Oral, HS      Continuous IV Infusions:  sodium chloride, 100 mL/hr, Intravenous, Continuous      PRN Meds:  acetaminophen, 650 mg, Oral, Q6H PRN  albuterol, 2 puff, Inhalation, Q4H PRN  albuterol, 2.5 mg, Nebulization, Q4H PRN  benzonatate, 100 mg, Oral, TID PRN  LORazepam, 0.5 mg, Oral, HS PRN      ED Triage Vitals [09/29/24 1011]   Temperature Pulse Respirations Blood Pressure SpO2 Pain Score   98.7 °F (37.1 °C) (!) 129 22 135/83 (!) 85 % No Pain     Weight (last 2 days)       Date/Time Weight    10/01/24 1036 71.7 (158)    09/29/24 1245 71.7 (158.18)            Vital Signs (last 3 days)       Date/Time Temp Pulse Resp BP MAP (mmHg) SpO2 Calculated FIO2 (%) - Nasal Cannula Nasal Cannula O2 Flow Rate (L/min) O2 Device O2 Interface Device Patient Position - Orthostatic VS [place] Coma Scale Score Pain    10/01/24 1400 97.5 °F (36.4 °C) 77 18 127/71 90 96 % -- -- -- -- Lying 15 --    10/01/24 1339 -- -- -- -- -- 99 % -- -- -- -- -- -- --    10/01/24 1316 -- -- -- -- -- 97 % 24 1 L/min  Nasal cannula -- -- -- --     10/01/24 1300 97.6 °F (36.4 °C) 79 18 138/74 95 99 % -- -- Nasal cannula -- Lying 15 --    10/01/24 12:08:37 -- 72 16 126/74 91 97 % -- -- -- -- -- -- --    10/01/24 1200 97.6 °F (36.4 °C) 74 19 133/70 91 95 % -- -- -- -- Sitting 15 --    10/01/24 1100 97.5 °F (36.4 °C) 74 18 126/70 -- 94 % 28 2 L/min -- -- -- 15 --    10/01/24 1036 -- 97 -- 150/78 -- -- -- -- -- -- -- -- --    10/01/24 1009 -- -- -- -- -- -- 28 2 L/min Nasal cannula -- -- 15 No Pain    10/01/24 0912 -- -- -- -- -- -- -- -- -- -- -- -- No Pain    10/01/24 0900 -- -- -- -- -- -- -- -- -- -- -- -- No Pain    10/01/24 07:30:53 97.4 °F (36.3 °C) 97 19 150/78 102 89 % -- -- -- -- Lying -- --    10/01/24 0702 -- -- -- -- -- 92 % -- -- None (Room air) -- -- -- --    09/30/24 23:15:40 97.6 °F (36.4 °C) 76 18 128/67 87 93 % -- -- None (Room air) -- Lying -- --    09/30/24 20:20:57 -- 83 19 134/69 91 93 % -- -- None (Room air) -- -- -- --    09/30/24 2020 -- -- -- -- -- -- -- -- -- -- -- 15 No Pain    09/30/24 1958 -- -- -- -- -- -- -- -- None (Room air) -- -- -- --    09/30/24 14:05:22 -- 77 19 124/60 81 89 % -- -- -- -- -- -- --    09/30/24 0928 -- -- -- -- -- -- -- -- -- -- -- 15 --    09/30/24 0900 -- -- -- -- -- -- -- -- -- -- -- -- No Pain    09/30/24 07:48:50 97.7 °F (36.5 °C) 87 20 141/82 102 91 % -- -- -- -- -- -- --    09/30/24 0500 -- 75 -- -- -- 92 % -- -- None (Room air) -- -- -- --    09/29/24 22:17:41 97.7 °F (36.5 °C) 82 18 143/80 101 95 % 28 2 L/min Nasal cannula -- Lying 15 No Pain    09/29/24 15:36:44 98 °F (36.7 °C) 75 16 138/73 95 94 % -- -- -- -- -- -- --    09/29/24 1448 -- 79 -- -- -- 95 % 32 3 L/min  Nasal cannula -- -- -- --    09/29/24 1425 -- 82 -- -- -- 95 % -- -- -- -- -- -- --    09/29/24 1332 -- -- -- -- -- -- -- -- -- -- -- 15 --    09/29/24 1300 -- -- -- -- -- -- -- -- -- -- -- -- No Pain    09/29/24 1250 97.8 °F (36.6 °C) 90 20 136/72 93 90 % 40 5 L/min Nasal cannula -- -- -- --    09/29/24 1200 -- 96 22 149/63 91 92 % -- --  None (Room air) -- -- -- --    09/29/24 1146 -- -- -- -- -- -- 36 4 L/min Nasal cannula -- -- -- --    09/29/24 1130 -- 94 22 136/63 91 97 % -- -- -- -- -- -- --    09/29/24 1045 -- 97 21 127/66 90 96 % -- -- None (Room air) -- Sitting -- --    09/29/24 1027 -- -- -- -- -- -- -- -- -- -- -- 15 No Pain    09/29/24 1018 -- -- -- -- -- 97 % -- -- -- Full face mask -- -- --    09/29/24 1011 98.7 °F (37.1 °C) 129 22 135/83 -- 85 % -- -- None (Room air) -- Lying -- No Pain              Pertinent Labs/Diagnostic Test Results:   Radiology:  MRI brain wo contrast   Final Interpretation by Anca Weber MD (10/01 1311)      1.  Multiple foci of acute infarcts involving bilateral frontoparietal cortices and subcortical white matter (left greater than right). No mass effect or hemorrhagic transformation.   2.  Old right centrum semiovale lacunar infarct. Mild chronic microangiopathic change.   3.  Acute or subacute sinus disease as described.      Workstation performed: [id number]         CTA head and neck w wo contrast   Final Interpretation by Anca Weber MD (10/01 2078)      CT Brain:      1.  Findings this for acute or subacute cortical infarct in the left precentral gyrus. No intracranial hemorrhage or mass effect.   2.  Chronic right centrum semiovale lacunar infarct. Mild microangiopathic change.   3.  Acute or subacute sinusitis as described.      CT Angiography:      4.  Severe multifocal atherosclerotic stenosis in the cavernous and supraclinoid segments of bilateral internal carotid arteries.   5.  Severe atherosclerotic stenosis in pre-PICA segments of bilateral intracranial vertebral arteries.   6.  Focal moderate mid basilar artery stenosis.   7.  Atherosclerotic stenosis of the cervical ICAs, about 55% on the left and 50% on the right. No significant stenosis in the cervical vertebral arteries.   8.  No apparent intracranial aneurysm.                     I personally discussed this study with NIKO CARTER on  10/1/2024 12:35 PM.                        Workstation performed: [id number]         XR chest 1 view portable   ED Interpretation by Anand Stevens Jr.,  (09/29 1046)   Presumed left lingular infiltrate.      Final Interpretation by Staci Shah MD (09/29 1918)      Mild opacity in the left base which could be due to atelectasis or pneumonia.            Workstation performed: [id number]         CTA dissection protocol chest/abdomen/pelvis    (Results Pending)     Cardiology:  Echo complete w/ contrast if indicated   Final Result by Marin John MD (10/01 1108)        Left Ventricle: Left ventricular cavity size is normal. Wall thickness    is normal. The left ventricular ejection fraction is 50%. Systolic    function is low normal. There is mild hypokinesia of the posterior wall.    Diastolic function is mildly abnormal, consistent with grade I (abnormal)    relaxation.     Aortic Valve: There is mild regurgitation.     The right ventricular systolic pressure is normal. The estimated right    ventricular systolic pressure is 37.00 mmHg.         ECG 12 lead   Final Result by Marin John MD (09/29 2149)   Sinus rhythm with frequent Premature ventricular complexes   Nonspecific ST-t wave changes   When compared with ECG of 29-SEP-2024 10:14, (unconfirmed)   Heart rate has slowed    Confirmed by Marin John ([id number]) on 9/29/2024 9:49:31 PM      ECG 12 lead   Final Result by Marin John MD (09/29 2153)   Sinus tachycardia with Premature atrial complexes   Nonspecific ST-t wave changes   When compared with ECG of 28-APR-2024 10:15,   minor changes in ST configuration    Confirmed by Marin John ([id number]) on 9/29/2024 9:53:50 PM                  Results from last 7 days   Lab Units 10/01/24  0421 09/30/24  0448 09/29/24  1022 09/26/24  0443 09/25/24  0457   WBC Thousand/uL 10.90* 11.07* 15.13* 9.26 9.02   HEMOGLOBIN g/dL 11.6* 11.5* 13.5 11.7* 11.2*   HEMATOCRIT % 35.1* 34.5* 39.8 35.7*  33.3*   PLATELETS Thousands/uL 235 223 275 189 159   TOTAL NEUT ABS Thousands/µL  --   --  13.02*  --   --          Results from last 7 days   Lab Units 10/01/24  0421 09/30/24  0448 09/29/24  1022 09/26/24  0443 09/25/24  0457   SODIUM mmol/L 133* 135 134* 132* 134*   POTASSIUM mmol/L 4.7 4.3 4.3 4.3 4.0   CHLORIDE mmol/L 101 102 98 100 102   CO2 mmol/L 23 23 25 21 21   ANION GAP mmol/L 9 10 11 11 11   BUN mg/dL 28* 23 23 21 22   CREATININE mg/dL 0.90 0.85 1.07 0.86 0.85   EGFR ml/min/1.73sq m 81 83 66 83 83   CALCIUM mg/dL 8.7 8.5 9.3 8.2* 8.4     Results from last 7 days   Lab Units 09/29/24  1022   AST U/L 13   ALT U/L 21   ALK PHOS U/L 59   TOTAL PROTEIN g/dL 7.0   ALBUMIN g/dL 3.4*   TOTAL BILIRUBIN mg/dL 0.87     Results from last 7 days   Lab Units 10/01/24  1128 10/01/24  0729 09/30/24  2134 09/30/24  1725 09/30/24  1613 09/30/24  1107 09/30/24  0753 09/29/24  1631 09/26/24  1110 09/26/24  0749 09/25/24  2113 09/25/24  1640   POC GLUCOSE mg/dl 305* 251* 282* 401* 442* 387* 217* 398* 367* 343* 300* 361*     Results from last 7 days   Lab Units 10/01/24  0421 09/30/24  1733 09/30/24  0448 09/29/24  1022 09/26/24  0443 09/25/24  0457   GLUCOSE RANDOM mg/dL 253* 446* 240* 279* 320* 230*                 Results from last 7 days   Lab Units 09/29/24  1051   PH ERIN  7.388   PCO2 ERIN mm Hg 40.1*   PO2 ERIN mm Hg 49.7*   HCO3 ERIN mmol/L 23.6*   BASE EXC ERIN mmol/L -1.2   O2 CONTENT ERIN ml/dL 16.5   O2 HGB, VENOUS % 82.7*         Results from last 7 days   Lab Units 09/26/24  0443   CK TOTAL U/L 292     Results from last 7 days   Lab Units 09/29/24  1406 09/29/24  1224 09/29/24  1022   HS TNI 0HR ng/L  --   --  10   HS TNI 2HR ng/L  --  9  --    HSTNI D2 ng/L  --  -1  --    HS TNI 4HR ng/L 9  --   --    HSTNI D4 ng/L -1  --   --            Results from last 7 days   Lab Units 09/29/24  1022   PROTIME seconds 13.8   INR  1.01   PTT seconds 22*         Results from last 7 days   Lab Units 10/01/24  0421 09/29/24  1022  09/26/24  0443 09/25/24  0457   PROCALCITONIN ng/ml 0.41* 0.19 1.15* 1.84*     Results from last 7 days   Lab Units 09/29/24  1406 09/29/24  1031   LACTIC ACID mmol/L 1.3 2.6*     Results from last 7 days   Lab Units 09/29/24  1022   BNP pg/mL 139*                   Results from last 7 days   Lab Units 10/01/24  0834 09/29/24  1022   BLOOD CULTURE   --  No Growth at 48 hrs.  Staphylococcus coagulase negative*   GRAM STAIN RESULT  1+ Polys*  1+ Gram negative rods*  1+ Gram positive cocci in pairs*  1+ Epithelial Cells* Gram positive cocci in clusters*           Past Medical History:   Diagnosis Date    Asthma     Diabetes mellitus ([facility])     Environmental allergies     Hyperlipidemia     Hypertension     Macular degeneration 07/13/2020    right eye    Orthostatic hypotension     Osteopenia     Pneumothorax     Sinusitis          Admitting Diagnosis: Shortness of breath [R06.02]  Pneumonia [J18.9]  COPD exacerbation ([facility]) [J44.1]  Sepsis ([facility]) [A41.9]  COVID-19 [U07.1]  Age/Sex: 78 y.o. male    Network Utilization Review Department  ATTENTION: Please call with any questions or concerns to [phone] and carefully listen to the prompts so that you are directed to the right person. All voicemails are confidential.   For Discharge needs, contact Care Management DC Support Team at [phone] opt. [phone]  Send all requests for admission clinical reviews, approved or denied determinations and any other requests to dedicated fax number below belonging to the campus where the patient is receiving treatment. List of dedicated fax numbers for the Facilities:  FACILITY NAME UR FAX NUMBER   ADMISSION DENIALS (Administrative/Medical Necessity) [phone]   DISCHARGE SUPPORT TEAM (NETWORK) [phone]   PARENT CHILD HEALTH (Maternity/NICU/Pediatrics) [phone]   [facility] [phone]   [facility] [phone]   [facility] [phone]   [facility]  [facility] [phone]   [facility] [phone]   [facility] [phone]   [facility] [phone]   [facility] [phone]   [facility] [phone]   [facility] [phone]   [facility] [phone]   [facility] [phone]

## 2024-09-30 NOTE — ASSESSMENT & PLAN NOTE
Day 10 of infection, hospitalized 9/24/2024, received remdesivir and baricitinib  Can go off of precautions

## 2024-09-30 NOTE — ASSESSMENT & PLAN NOTE
Arrived in suspected COPD exacerbation/pneumonia on CPAP, later required BiPAP, decreased to nasal cannula now saturating well on room air.  Resolved  Treat suspected underlying cause of COPD exacerbation and suspected pneumonia  Monitor oxygen requirements and wean as tolerated  Pulmonology recommendations appreciated.  Obtain echocardiogram, avoid positive fluid balance

## 2024-09-30 NOTE — ASSESSMENT & PLAN NOTE
Suspected COPD exacerbation, lung sounds decreased on exam  Arrived to ER on CPAP, transferred over to [facility] for 2 hours .  Received IV steroids while in the ER   Evaluated by critical care who felt that patient is stable for medical admission  Decrease Solu-Medrol 40 mg IV to BID   Oxygen and respiratory protocol  Continue nebulized medication  Start Mucinex, continue Singulair  Conduct serial physical assessments

## 2024-09-30 NOTE — PLAN OF CARE
Problem: Potential for Falls  Goal: Patient will remain free of falls  Description: INTERVENTIONS:  - Educate patient/family on patient safety including physical limitations  - Instruct patient to call for assistance with activity   - Consult OT/PT to assist with strengthening/mobility   - Keep Call bell within reach  - Keep bed low and locked with side rails adjusted as appropriate  - Keep care items and personal belongings within reach  - Initiate and maintain comfort rounds  - Make Fall Risk Sign visible to staff  - Offer Toileting every 1 Hours, in advance of need  - Initiate/Maintain bed alarm  - Obtain necessary fall risk management equipment: bed alarm   - Apply yellow socks and bracelet for high fall risk patients  - Consider moving patient to room near nurses station  Outcome: Progressing     Problem: PAIN - ADULT  Goal: Verbalizes/displays adequate comfort level or baseline comfort level  Description: Interventions:  - Encourage patient to monitor pain and request assistance  - Assess pain using appropriate pain scale  - Administer analgesics based on type and severity of pain and evaluate response  - Implement non-pharmacological measures as appropriate and evaluate response  - Consider cultural and social influences on pain and pain management  - Notify physician/advanced practitioner if interventions unsuccessful or patient reports new pain  Outcome: Progressing     Problem: INFECTION - ADULT  Goal: Absence or prevention of progression during hospitalization  Description: INTERVENTIONS:  - Assess and monitor for signs and symptoms of infection  - Monitor lab/diagnostic results  - Monitor all insertion sites, i.e. indwelling lines, tubes, and drains  - Monitor endotracheal if appropriate and nasal secretions for changes in amount and color  - [place] appropriate cooling/warming therapies per order  - Administer medications as ordered  - Instruct and encourage patient and family to use good hand  hygiene technique  - Identify and instruct in appropriate isolation precautions for identified infection/condition  Outcome: Progressing  Goal: Absence of fever/infection during neutropenic period  Description: INTERVENTIONS:  - Monitor WBC    Outcome: Progressing     Problem: SAFETY ADULT  Goal: Patient will remain free of falls  Description: INTERVENTIONS:  - Educate patient/family on patient safety including physical limitations  - Instruct patient to call for assistance with activity   - Consult OT/PT to assist with strengthening/mobility   - Keep Call bell within reach  - Keep bed low and locked with side rails adjusted as appropriate  - Keep care items and personal belongings within reach  - Initiate and maintain comfort rounds  - Make Fall Risk Sign visible to staff  - Offer Toileting every 1 Hours, in advance of need  - Initiate/Maintain bed alarm  - Obtain necessary fall risk management equipment: bed alarm  - Apply yellow socks and bracelet for high fall risk patients  - Consider moving patient to room near nurses station  Outcome: Progressing  Goal: Maintain or return to baseline ADL function  Description: INTERVENTIONS:  -  Assess patient's ability to carry out ADLs; assess patient's baseline for ADL function and identify physical deficits which impact ability to perform ADLs (bathing, care of mouth/teeth, toileting, grooming, dressing, etc.)  - Assess/evaluate cause of self-care deficits   - Assess range of motion  - Assess patient's mobility; develop plan if impaired  - Assess patient's need for assistive devices and provide as appropriate  - Encourage maximum independence but intervene and supervise when necessary  - Involve family in performance of ADLs  - Assess for home care needs following discharge   - Consider OT consult to assist with ADL evaluation and planning for discharge  - Provide patient education as appropriate  Outcome: Progressing  Goal: Maintains/Returns to pre admission functional  level  Description: INTERVENTIONS:  - Perform AM-PAC 6 Click Basic Mobility/ Daily Activity assessment daily.  - Set and communicate daily mobility goal to care team and patient/family/caregiver.   - Collaborate with rehabilitation services on mobility goals if consulted  - Perform Range of Motion 3 times a day.  - Reposition patient every 2 hours.  - Dangle patient 3 times a day  - Stand patient 3 times a day  - Ambulate patient 3 times a day  - Out of bed to chair 3 times a day   - Out of bed for meals 3 times a day  - Out of bed for toileting  - Record patient progress and toleration of activity level   Outcome: Progressing     Problem: DISCHARGE PLANNING  Goal: Discharge to home or other facility with appropriate resources  Description: INTERVENTIONS:  - Identify barriers to discharge w/patient and caregiver  - Arrange for needed discharge resources and transportation as appropriate  - Identify discharge learning needs (meds, wound care, etc.)  - Arrange for interpretive services to assist at discharge as needed  - Refer to Case Management Department for coordinating discharge planning if the patient needs post-hospital services based on physician/advanced practitioner order or complex needs related to functional status, cognitive ability, or social support system  Outcome: Progressing     Problem: Knowledge Deficit  Goal: Patient/family/caregiver demonstrates understanding of disease process, treatment plan, medications, and discharge instructions  Description: Complete learning assessment and assess knowledge base.  Interventions:  - Provide teaching at level of understanding  - Provide teaching via preferred learning methods  Outcome: Progressing     Problem: Nutrition/Hydration-ADULT  Goal: Nutrient/Hydration intake appropriate for improving, restoring or maintaining nutritional needs  Description: Monitor and assess patient's nutrition/hydration status for malnutrition. Collaborate with interdisciplinary  team and initiate plan and interventions as ordered.  Monitor patient's weight and dietary intake as ordered or per policy. Utilize nutrition screening tool and intervene as necessary. Determine patient's food preferences and provide high-protein, high-caloric foods as appropriate.     INTERVENTIONS:  - Monitor oral intake, urinary output, labs, and treatment plans  - Assess nutrition and hydration status and recommend course of action  - Evaluate amount of meals eaten  - Assist patient with eating if necessary   - Allow adequate time for meals  - Recommend/ encourage appropriate diets, oral nutritional supplements, and vitamin/mineral supplements  - Order, calculate, and assess calorie counts as needed  - Recommend, monitor, and adjust tube feedings and TPN/PPN based on assessed needs  - Assess need for intravenous fluids  - Provide specific nutrition/hydration education as appropriate  - Include patient/family/caregiver in decisions related to nutrition  Outcome: Progressing

## 2024-09-30 NOTE — ASSESSMENT & PLAN NOTE
Lab Results   Component Value Date    HGBA1C 9.7 (H) 09/24/2024       Recent Labs     09/30/24  0753 09/30/24  1107 09/30/24  1613 09/30/24  1725   POCGLU 217* 387* 442* 401*       Blood Sugar Average: Last 72 hrs:  (P) 369  Diabetic diet  Fingerstick blood sugar with sliding scale coverage, increased to algorithm 4 in the setting of steroid-induced hyperglycemia  Continue Lantus 10 units at bedtime  Hold home glipizide and metformin, resume on discharge  Monitor glucose and adjust medication as needed  Hypoglycemia protocol

## 2024-09-30 NOTE — PROGRESS NOTES
Progress Note - Hospitalist   Name: Gold Van 78 y.o. male I MRN: [id number]  Unit/Bed#: -01 I Date of Admission: 9/29/2024   Date of Service: 9/30/2024 I Hospital Day: 1    Assessment & Plan  Acute respiratory failure with hypoxia (HCC)  Arrived in suspected COPD exacerbation/pneumonia on CPAP, later required BiPAP, decreased to nasal cannula now saturating well on room air.  Resolved  Treat suspected underlying cause of COPD exacerbation and suspected pneumonia  Monitor oxygen requirements and wean as tolerated  Pulmonology recommendations appreciated.  Obtain echocardiogram, avoid positive fluid balance  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HCC)  Suspected COPD exacerbation, lung sounds decreased on exam  Arrived to ER on CPAP, transferred over to BiPAP for 2 hours .  Received IV steroids while in the ER   Evaluated by critical care who felt that patient is stable for medical admission  Decrease Solu-Medrol 40 mg IV to BID   Oxygen and respiratory protocol  Continue nebulized medication  Start Mucinex, continue Singulair  Conduct serial physical assessments  Sepsis due to pneumonia (HCC)  Met sepsis criteria with tachycardia and leukocytosis (noted to be on chronic steroids)  Imaging with new left lingular markings  Lactic acid 2.6-->1.3 without intervention   Started on ceftriaxone and azithromycin in the ER, continue, D2  Blood cultures in progress  Monitor labs and vital signs  Obtain PT OT consultation  Type 2 diabetes mellitus with complication, without long-term current use of insulin (HCC)  Lab Results   Component Value Date    HGBA1C 9.7 (H) 09/24/2024       Recent Labs     09/30/24  0753 09/30/24  1107 09/30/24  1613 09/30/24  1725   POCGLU 217* 387* 442* 401*       Blood Sugar Average: Last 72 hrs:  (P) 369  Diabetic diet  Fingerstick blood sugar with sliding scale coverage, increased to algorithm 4 in the setting of steroid-induced hyperglycemia  Continue Lantus 10 units at bedtime  Hold home glipizide and  metformin, resume on discharge  Monitor glucose and adjust medication as needed  Hypoglycemia protocol  Essential hypertension  Continue amlodipine 5 mg p.o. twice daily and atenolol 25 mg p.o. daily  Monitor blood pressure  COVID-19  Day 10 of infection, hospitalized 2024, received remdesivir and baricitinib  Can go off of precautions  Pruritus  Takes prednisone chronically 10 mg alternating with 15 mg.  Oral steroids on hold while receiving IV Solu-Medrol    VTE Pharmacologic Prophylaxis: VTE Score: 6 High Risk (Score >/= 5) - Pharmacological DVT Prophylaxis Ordered: enoxaparin (Lovenox). Sequential Compression Devices Ordered.    Mobility:   Basic Mobility Inpatient Raw Score: 18  JH-HLM Goal: 6: Walk 10 steps or more  JH-HLM Achieved: 4: Move to chair/commode  JH-HLM Goal NOT achieved. Continue with multidisciplinary rounding and encourage appropriate mobility to improve upon JH-HLM goals.    Patient Centered Rounds: I performed bedside rounds with nursing staff today.   Discussions with Specialists or Other Care Team Provider: Case management, pulmonology    Education and Discussions with Family / Patient: Updated  (wife) at bedside.    Current Length of Stay: 1 day(s)  Current Patient Status: Inpatient   Certification Statement: The patient will continue to require additional inpatient hospital stay due to pneumonia, COPD exacerbation, IV steroids  Discharge Plan: Anticipate discharge in 24-48 hrs to rehab facility.    Code Status: Level 1 - Full Code    Subjective   Patient seen and examined.  He is off supplemental oxygen and said he is breathing a little better.  He says he is very weak and deconditioned however.  He is also reporting difficulty in grabbing things with his right hand.  He has a history of carpal tunnel repair 20 years ago.  No overnight events, denies fever or myalgia.    Objective     Vitals:   Temp (24hrs), Av.7 °F (36.5 °C), Min:97.7 °F (36.5 °C), Max:97.7 °F (36.5  °C)    Temp:  [97.7 °F (36.5 °C)] 97.7 °F (36.5 °C)  HR:  [75-87] 77  Resp:  [18-20] 19  BP: (124-143)/(60-82) 124/60  SpO2:  [89 %-95 %] 89 %  Body mass index is 21.45 kg/m².     Input and Output Summary (last 24 hours):     Intake/Output Summary (Last 24 hours) at 9/30/2024 1818  Last data filed at 9/30/2024 1405  Gross per 24 hour   Intake 1680 ml   Output 1000 ml   Net 680 ml       Physical Exam  Vitals and nursing note reviewed.   Constitutional:       General: He is not in acute distress.  HENT:      Head: Normocephalic and atraumatic.      Nose: Nose normal.      Mouth/Throat:      Mouth: Mucous membranes are moist.      Pharynx: Oropharynx is clear.   Eyes:      Pupils: Pupils are equal, round, and reactive to light.   Cardiovascular:      Rate and Rhythm: Normal rate and regular rhythm.      Pulses: Normal pulses.      Heart sounds: Normal heart sounds.   Pulmonary:      Effort: Pulmonary effort is normal.      Breath sounds: Decreased breath sounds present.   Abdominal:      General: Abdomen is flat. Bowel sounds are normal.      Palpations: Abdomen is soft.   Musculoskeletal:      Right lower leg: No edema.      Left lower leg: No edema.   Skin:     General: Skin is warm and dry.   Neurological:      General: No focal deficit present.      Mental Status: He is alert and oriented to person, place, and time.      Comments: Right hand with strong grasp equal to left hand.  Bilateral lower extremities with strength 5/5.  No facial droop, no dysarthria, no paresthesias.  There is a ganglion cyst present to the right wrist that patient had some fluid drained from about a month ago that he had squeezed. There is no redness or tenderness.   Psychiatric:         Mood and Affect: Mood normal.         Behavior: Behavior normal.          Lines/Drains:  Lines/Drains/Airways       Active Status       None                            Lab Results: I have reviewed the following results: CBC/BMP:   .     09/30/24  1584  09/30/24  1733   WBC 11.07*  --    HGB 11.5*  --    HCT 34.5*  --      --    SODIUM 135  --    K 4.3  --      --    CO2 23  --    BUN 23  --    CREATININE 0.85  --    GLUC 240* 446*       Results from last 7 days   Lab Units 09/30/24 0448 09/29/24  1022   WBC Thousand/uL 11.07* 15.13*   HEMOGLOBIN g/dL 11.5* 13.5   HEMATOCRIT % 34.5* 39.8   PLATELETS Thousands/uL 223 275   SEGS PCT %  --  86*   LYMPHO PCT % 5* 9*   MONO PCT % 2* 4   EOS PCT % 0 0     Results from last 7 days   Lab Units 09/30/24  1733 09/30/24 0448 09/29/24  1022   SODIUM mmol/L  --  135 134*   POTASSIUM mmol/L  --  4.3 4.3   CHLORIDE mmol/L  --  102 98   CO2 mmol/L  --  23 25   BUN mg/dL  --  23 23   CREATININE mg/dL  --  0.85 1.07   ANION GAP mmol/L  --  10 11   CALCIUM mg/dL  --  8.5 9.3   ALBUMIN g/dL  --   --  3.4*   TOTAL BILIRUBIN mg/dL  --   --  0.87   ALK PHOS U/L  --   --  59   ALT U/L  --   --  21   AST U/L  --   --  13   GLUCOSE RANDOM mg/dL 446* 240* 279*     Results from last 7 days   Lab Units 09/29/24  1022   INR  1.01     Results from last 7 days   Lab Units 09/30/24  1725 09/30/24  1613 09/30/24  1107 09/30/24  0753 09/29/24  1631 09/26/24  1110 09/26/24  0749 09/25/24  2113 09/25/24  1640 09/25/24  1050 09/25/24  0739 09/24/24  2058   POC GLUCOSE mg/dl 401* 442* 387* 217* 398* 367* 343* 300* 361* 357* 249* 296*     Results from last 7 days   Lab Units 09/24/24  0450   HEMOGLOBIN A1C % 9.7*     Results from last 7 days   Lab Units 09/29/24  1406 09/29/24  1031 09/29/24  1022 09/26/24  0443 09/25/24  0457   LACTIC ACID mmol/L 1.3 2.6*  --   --   --    PROCALCITONIN ng/ml  --   --  0.19 1.15* 1.84*       Recent Cultures (last 7 days):   Results from last 7 days   Lab Units 09/29/24  1022   BLOOD CULTURE  No Growth at 24 hrs.   GRAM STAIN RESULT  Gram positive cocci in clusters*       Last 24 Hours Medication List:     Current Facility-Administered Medications:     acetaminophen (TYLENOL) tablet 650 mg, Q6H PRN     albuterol (PROVENTIL HFA,VENTOLIN HFA) inhaler 2 puff, Q4H PRN    albuterol inhalation solution 2.5 mg, Q4H PRN    amLODIPine (NORVASC) tablet 5 mg, BID    atenolol (TENORMIN) tablet 25 mg, Daily    azithromycin (ZITHROMAX) tablet 500 mg, Q24H    benzonatate (TESSALON PERLES) capsule 100 mg, TID PRN    cefTRIAXone (ROCEPHIN) IVPB (premix in dextrose) 1,000 mg 50 mL, Q24H, Last Rate: 1,000 mg (09/30/24 1216)    enoxaparin (LOVENOX) subcutaneous injection 40 mg, Daily    famotidine (PEPCID) tablet 20 mg, BID    guaiFENesin (MUCINEX) 12 hr tablet 600 mg, BID    insulin glargine (LANTUS) subcutaneous injection 10 Units 0.1 mL, HS    insulin lispro (HumALOG/ADMELOG) 100 units/mL subcutaneous injection 2-12 Units, TID AC **AND** [START ON 10/1/2024] Fingerstick Glucose (POCT), TID AC    insulin lispro (HumALOG/ADMELOG) 100 units/mL subcutaneous injection 2-12 Units, HS    levalbuterol (XOPENEX) inhalation solution 1.25 mg, TID    lisinopril (ZESTRIL) tablet 20 mg, BID    LORazepam (ATIVAN) tablet 0.5 mg, HS PRN    [START ON 10/1/2024] methylPREDNISolone sodium succinate (Solu-MEDROL) injection 40 mg, Q12H SANDRA    montelukast (SINGULAIR) tablet 10 mg, HS    sodium chloride 3 % inhalation solution 4 mL, TID    Administrative Statements   Today, Patient Was Seen By: BELGICA Thompson  I have spent a total time of 40 minutes in caring for this patient on the day of the visit/encounter including Diagnostic results, Prognosis, Risks and benefits of tx options, Instructions for management, Patient and family education, Importance of tx compliance, Risk factor reductions, Impressions, Counseling / Coordination of care, Documenting in the medical record, Reviewing / ordering tests, medicine, procedures  , Obtaining or reviewing history  , and Communicating with other healthcare professionals .    **Please Note: This note may have been constructed using a voice recognition system.**

## 2024-09-30 NOTE — CONSULTS
Pulmonary Medicine-Consultation  Gold Van 78 y.o. male MRN: [id number]  Unit/Bed#: -01 Encounter: [id number]      Assessment:  Acute hypoxic respiratory failure-weaned to room air now, seems pneumonia/postviral vs community-acquired vs new myocardial dysfunction/CHF event uptrending BNP  Abnormal chest x-ray-mild opacity at the mid lung field/possible atelectasis vs pneumonia  Recent COVID-19 tracheobronchitis  COPD/asthma-last FEV1 of 50%/significant BD response  Former tobacco abuse-30-pack-year quit in the 1990s  + Blood cultures-GPC/ seems contaminant    Recommendations/discussion:  Continue empiric antibiotic azithromycin/ceftriaxone, will check PCT tomorrow  Switched steroids to Solu-Medrol 40 mg q12, does not seem to be in severe exacerbation  DC inhaler, switch to 3% sodium chloride/Xopenex nebulizer tid  Ordered sputum cultures  Ordered TTE, avoid positive fluid balance    Will continue to follow    Physician Requesting Consult: Binh Hurd,     Reason for Consult / Principal Problem: Hypoxic respiratory failure/pneumonia    HPI:   78 y.o. male with a h/o asthma/COPD, DM2, environmental allergens, HTN, former tobacco abuse    Recently hospitalized from 9/24 until 9/26 for COVID-19 tracheobronchitis, presented with constitutional symptoms including dyspnea/cough, treated with steroids/remdesivir.    After discharge, felt better for 2 to 3 days, 2 days ago noted worse dyspnea/more fatigue and found to be confused by his wife.  Readmitted for pneumonia, found to have mid lung field opacity on the left, leukocytosis with neutrophilia, BNP 70>> 139, normal PCT x 1 checked yesterday.  Admitted to  IM, initially required BiPAP for increased work of breathing, weaned to room air today.    On my assessment, patient sitting in chair wife is at bedside.  Reports some improvement of dyspnea, easier to breathe, started to have a moist cough but not able to expel sputum.  Not confused anymore.  Still  feeling fatigued, not at baseline yet.  Reports no other exposure to sick contact recently.    Inpatient consult to Pulmonology  Consult performed by: Shakir Woods MD  Consult ordered by: BELGICA Thompson          Review of Systems  As per hpi, all other systems reviewed and were negative      Studies:    Imaging Studies: I have personally reviewed pertinent films in PACS    EKG, Pathology, and Other Studies: I have personally reviewed pertinent films in PACS    Pulmonary Results (PFTs, PSG): Reviewed    Historical Information   Past Medical History:   Diagnosis Date    Asthma     Diabetes mellitus (HCC)     Environmental allergies     Hyperlipidemia     Hypertension     Macular degeneration 07/13/2020    right eye    Orthostatic hypotension     Osteopenia     Pneumothorax     Sinusitis      Past Surgical History:   Procedure Laterality Date    COLONOSCOPY      KNEE CARTILAGE SURGERY Right 1964    VASECTOMY      WISDOM TOOTH EXTRACTION      x4     Social History   Social History     Substance and Sexual Activity   Alcohol Use Never     Social History     Substance and Sexual Activity   Drug Use Never     Social History     Tobacco Use   Smoking Status Former   Smokeless Tobacco Never   Tobacco Comments    quit about 25 years ago     E-Cigarette/Vaping    E-Cigarette Use Never User      E-Cigarette/Vaping Substances    Nicotine No     THC No     CBD No     Flavoring No     Other No     Unknown No          Family History:   Family History   Problem Relation Age of Onset    Asthma Mother     Emphysema Mother     Cancer Father     Asthma Brother     Cancer Brother        Meds/Allergies   all current active meds have been reviewed    Allergies   Allergen Reactions    Ciprofloxacin Shortness Of Breath    Sulfamethoxazole Shortness Of Breath    Trimethoprim Shortness Of Breath    Dexamethasone Other (See Comments)    Triamcinolone Other (See Comments)    Bactrim [Sulfamethoxazole-Trimethoprim] Fever  "    Fever of 107       Objective   Vitals: Blood pressure 124/60, pulse 77, temperature 97.7 °F (36.5 °C), resp. rate 19, height 6' (1.829 m), weight 71.7 kg (158 lb 2.9 oz), SpO2 (!) 89%.,Body mass index is 21.45 kg/m².    Intake/Output Summary (Last 24 hours) at 9/30/2024 1527  Last data filed at 9/30/2024 1405  Gross per 24 hour   Intake 1680 ml   Output 1450 ml   Net 230 ml     Invasive Devices       Peripheral Intravenous Line  Duration             Peripheral IV 09/29/24 Dorsal (posterior);Right Forearm 1 day                    Physical Exam  Body mass index is 21.45 kg/m².   Gen: not in acute distress, thin, sitting in chair, nontoxic  Neck/Eyes: supple,  PERRL  Ear: normal appearance, no significant hearing impairment  Nose:  normal nasal mucosa, no drainage  Salivary glands: soft nontender  Chest: normal respiratory efforts, mild/fine crackles at the left base/midlung field, otherwise diminished but clear sounds  CV: RRR, no murmurs appreciated, no peripheral edema  Abdomen: soft, non tender  Neuro: AAO X3, no focal motor deficit       Lab Results: I have personally reviewed pertinent lab results.          None    Portions of the record may have been created with voice recognition software.  Occasional wrong word or \"sound a like\" substitutions may have occurred due to the inherent limitations of voice recognition software.  Read the chart carefully and recognize, using context, where substitutions have occurred.    "

## 2024-09-30 NOTE — ASSESSMENT & PLAN NOTE
Met sepsis criteria with tachycardia and leukocytosis (noted to be on chronic steroids)  Imaging with new left lingular markings  Lactic acid 2.6-->1.3 without intervention   Started on ceftriaxone and azithromycin in the ER, continue, D2  Blood cultures in progress  Monitor labs and vital signs  Obtain PT OT consultation

## 2024-09-30 NOTE — CASE MANAGEMENT
Case Management Discharge Planning Note    Patient name Gold Van  Location /-01 MRN [id number]  : 1945 Date 2024       Current Admission Date: 2024  Current Admission Diagnosis:Acute respiratory failure with hypoxia (HCC)   Patient Active Problem List    Diagnosis Date Noted Date Diagnosed    COVID-19 2024     Acute respiratory insufficiency 2024     Shortness of breath 2024     COPD with asthma 2024     History of pneumothorax 2024     Acute respiratory failure with hypoxia (HCC) 2024     Non-recurrent bilateral inguinal hernia without obstruction or gangrene 2024     Pruritus 2024     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 2021     Sepsis due to pneumonia (HCC) 2021     Hyponatremia 2021     Pulmonary nodule 2021     Type 2 diabetes mellitus with complication, without long-term current use of insulin (HCC) 10/23/2017     Essential hypertension 10/23/2017     Asthma 10/23/2017     Hyperlipidemia 10/23/2017       LOS (days): 1  Geometric Mean LOS (GMLOS) (days): 5.1  Days to GMLOS:3.9     OBJECTIVE:  Risk of Unplanned Readmission Score: 20.46         Current admission status: Inpatient   Preferred Pharmacy:   [facility] - [facility] [place] - [address] [id number] [facility] [address]  Phone: [phone] Fax: [phone]    [facility] - [place] - [address]  [address] [facility] [address]  Phone: [phone] Fax: [phone]    Primary Care Provider: Shayne Diaz MD    Primary Insurance: [facility] REP  Secondary Insurance:     DISCHARGE DETAILS:     CM sent referral to [facility] and [facility] incase patient and family are in agreement. CM will cancel if not in agreement. Assessment is pending.

## 2024-10-01 ENCOUNTER — APPOINTMENT (INPATIENT)
Dept: NON INVASIVE DIAGNOSTICS | Facility: HOSPITAL | Age: 79
DRG: 871 | End: 2024-10-01
Payer: COMMERCIAL

## 2024-10-01 ENCOUNTER — APPOINTMENT (INPATIENT)
Dept: CT IMAGING | Facility: HOSPITAL | Age: 79
DRG: 871 | End: 2024-10-01
Payer: COMMERCIAL

## 2024-10-01 ENCOUNTER — APPOINTMENT (INPATIENT)
Dept: MRI IMAGING | Facility: HOSPITAL | Age: 79
DRG: 871 | End: 2024-10-01
Payer: COMMERCIAL

## 2024-10-01 PROBLEM — R27.8 DYSMETRIA: Status: ACTIVE | Noted: 2024-10-01

## 2024-10-01 PROBLEM — R27.9 DISCOORDINATION: Status: ACTIVE | Noted: 2024-10-01

## 2024-10-01 LAB
ANION GAP SERPL CALCULATED.3IONS-SCNC: 9 MMOL/L (ref 4–13)
BSA FOR ECHO PROCEDURE: 1.93 M2
BUN SERPL-MCNC: 28 MG/DL (ref 5–25)
CALCIUM SERPL-MCNC: 8.7 MG/DL (ref 8.4–10.2)
CHLORIDE SERPL-SCNC: 101 MMOL/L (ref 96–108)
CO2 SERPL-SCNC: 23 MMOL/L (ref 21–32)
CREAT SERPL-MCNC: 0.9 MG/DL (ref 0.6–1.3)
ERYTHROCYTE [DISTWIDTH] IN BLOOD BY AUTOMATED COUNT: 13.4 % (ref 11.6–15.1)
GFR SERPL CREATININE-BSD FRML MDRD: 81 ML/MIN/1.73SQ M
GLUCOSE SERPL-MCNC: 251 MG/DL (ref 65–140)
GLUCOSE SERPL-MCNC: 253 MG/DL (ref 65–140)
GLUCOSE SERPL-MCNC: 280 MG/DL (ref 65–140)
GLUCOSE SERPL-MCNC: 305 MG/DL (ref 65–140)
GLUCOSE SERPL-MCNC: 305 MG/DL (ref 65–140)
HCT VFR BLD AUTO: 35.1 % (ref 36.5–49.3)
HGB BLD-MCNC: 11.6 G/DL (ref 12–17)
MCH RBC QN AUTO: 29.9 PG (ref 26.8–34.3)
MCHC RBC AUTO-ENTMCNC: 33 G/DL (ref 31.4–37.4)
MCV RBC AUTO: 91 FL (ref 82–98)
PLATELET # BLD AUTO: 235 THOUSANDS/UL (ref 149–390)
PMV BLD AUTO: 9.8 FL (ref 8.9–12.7)
POTASSIUM SERPL-SCNC: 4.7 MMOL/L (ref 3.5–5.3)
PROCALCITONIN SERPL-MCNC: 0.41 NG/ML
RA PRESSURE ESTIMATED: 10 MMHG
RBC # BLD AUTO: 3.88 MILLION/UL (ref 3.88–5.62)
RV PSP: 37 MMHG
SL CV LV EF: 50
SODIUM SERPL-SCNC: 133 MMOL/L (ref 135–147)
TR MAX PG: 27 MMHG
TR PEAK VELOCITY: 2.6 M/S
WBC # BLD AUTO: 10.9 THOUSAND/UL (ref 4.31–10.16)

## 2024-10-01 PROCEDURE — 84145 PROCALCITONIN (PCT): CPT | Performed by: INTERNAL MEDICINE

## 2024-10-01 PROCEDURE — 99232 SBSQ HOSP IP/OBS MODERATE 35: CPT | Performed by: INTERNAL MEDICINE

## 2024-10-01 PROCEDURE — 97163 PT EVAL HIGH COMPLEX 45 MIN: CPT

## 2024-10-01 PROCEDURE — 85027 COMPLETE CBC AUTOMATED: CPT | Performed by: NURSE PRACTITIONER

## 2024-10-01 PROCEDURE — 70496 CT ANGIOGRAPHY HEAD: CPT

## 2024-10-01 PROCEDURE — 94760 N-INVAS EAR/PLS OXIMETRY 1: CPT

## 2024-10-01 PROCEDURE — 80048 BASIC METABOLIC PNL TOTAL CA: CPT | Performed by: NURSE PRACTITIONER

## 2024-10-01 PROCEDURE — 94640 AIRWAY INHALATION TREATMENT: CPT

## 2024-10-01 PROCEDURE — 97167 OT EVAL HIGH COMPLEX 60 MIN: CPT

## 2024-10-01 PROCEDURE — 87081 CULTURE SCREEN ONLY: CPT | Performed by: INTERNAL MEDICINE

## 2024-10-01 PROCEDURE — 87205 SMEAR GRAM STAIN: CPT | Performed by: INTERNAL MEDICINE

## 2024-10-01 PROCEDURE — 99222 1ST HOSP IP/OBS MODERATE 55: CPT | Performed by: PSYCHIATRY & NEUROLOGY

## 2024-10-01 PROCEDURE — 93306 TTE W/DOPPLER COMPLETE: CPT

## 2024-10-01 PROCEDURE — 82948 REAGENT STRIP/BLOOD GLUCOSE: CPT

## 2024-10-01 PROCEDURE — 94664 DEMO&/EVAL PT USE INHALER: CPT

## 2024-10-01 PROCEDURE — 87106 FUNGI IDENTIFICATION YEAST: CPT | Performed by: INTERNAL MEDICINE

## 2024-10-01 PROCEDURE — 70551 MRI BRAIN STEM W/O DYE: CPT

## 2024-10-01 PROCEDURE — 70498 CT ANGIOGRAPHY NECK: CPT

## 2024-10-01 PROCEDURE — 93306 TTE W/DOPPLER COMPLETE: CPT | Performed by: INTERNAL MEDICINE

## 2024-10-01 PROCEDURE — 99233 SBSQ HOSP IP/OBS HIGH 50: CPT | Performed by: NURSE PRACTITIONER

## 2024-10-01 PROCEDURE — 87077 CULTURE AEROBIC IDENTIFY: CPT | Performed by: INTERNAL MEDICINE

## 2024-10-01 PROCEDURE — 87186 SC STD MICRODIL/AGAR DIL: CPT | Performed by: INTERNAL MEDICINE

## 2024-10-01 PROCEDURE — 87070 CULTURE OTHR SPECIMN AEROBIC: CPT | Performed by: INTERNAL MEDICINE

## 2024-10-01 RX ORDER — ASPIRIN 81 MG/1
81 TABLET, CHEWABLE ORAL DAILY
Status: DISCONTINUED | OUTPATIENT
Start: 2024-10-02 | End: 2024-10-04 | Stop reason: HOSPADM

## 2024-10-01 RX ORDER — ASPIRIN 81 MG/1
81 TABLET, CHEWABLE ORAL DAILY
Status: DISCONTINUED | OUTPATIENT
Start: 2024-10-01 | End: 2024-10-01

## 2024-10-01 RX ORDER — CLOPIDOGREL BISULFATE 75 MG/1
300 TABLET ORAL ONCE
Status: COMPLETED | OUTPATIENT
Start: 2024-10-01 | End: 2024-10-01

## 2024-10-01 RX ORDER — SODIUM CHLORIDE 9 MG/ML
100 INJECTION, SOLUTION INTRAVENOUS CONTINUOUS
Status: DISCONTINUED | OUTPATIENT
Start: 2024-10-01 | End: 2024-10-01

## 2024-10-01 RX ORDER — METHYLPREDNISOLONE SODIUM SUCCINATE 40 MG/ML
40 INJECTION, POWDER, LYOPHILIZED, FOR SOLUTION INTRAMUSCULAR; INTRAVENOUS EVERY 12 HOURS SCHEDULED
Status: COMPLETED | OUTPATIENT
Start: 2024-10-01 | End: 2024-10-01

## 2024-10-01 RX ORDER — SODIUM CHLORIDE 9 MG/ML
100 INJECTION, SOLUTION INTRAVENOUS CONTINUOUS
Status: DISCONTINUED | OUTPATIENT
Start: 2024-10-01 | End: 2024-10-02

## 2024-10-01 RX ORDER — CLOPIDOGREL BISULFATE 75 MG/1
75 TABLET ORAL DAILY
Status: DISCONTINUED | OUTPATIENT
Start: 2024-10-02 | End: 2024-10-04 | Stop reason: HOSPADM

## 2024-10-01 RX ORDER — PREDNISONE 20 MG/1
40 TABLET ORAL DAILY
Status: COMPLETED | OUTPATIENT
Start: 2024-10-02 | End: 2024-10-04

## 2024-10-01 RX ORDER — ASPIRIN 325 MG
325 TABLET ORAL ONCE
Status: COMPLETED | OUTPATIENT
Start: 2024-10-01 | End: 2024-10-01

## 2024-10-01 RX ORDER — ATORVASTATIN CALCIUM 40 MG/1
40 TABLET, FILM COATED ORAL EVERY EVENING
Status: DISCONTINUED | OUTPATIENT
Start: 2024-10-01 | End: 2024-10-04 | Stop reason: HOSPADM

## 2024-10-01 RX ADMIN — AMLODIPINE BESYLATE 5 MG: 5 TABLET ORAL at 08:58

## 2024-10-01 RX ADMIN — LEVALBUTEROL HYDROCHLORIDE 1.25 MG: 1.25 SOLUTION RESPIRATORY (INHALATION) at 13:16

## 2024-10-01 RX ADMIN — LEVALBUTEROL HYDROCHLORIDE 1.25 MG: 1.25 SOLUTION RESPIRATORY (INHALATION) at 07:02

## 2024-10-01 RX ADMIN — MONTELUKAST 10 MG: 10 TABLET, FILM COATED ORAL at 22:05

## 2024-10-01 RX ADMIN — IOHEXOL 85 ML: 350 INJECTION, SOLUTION INTRAVENOUS at 11:21

## 2024-10-01 RX ADMIN — AZITHROMYCIN DIHYDRATE 500 MG: 250 TABLET ORAL at 11:42

## 2024-10-01 RX ADMIN — INSULIN LISPRO 6 UNITS: 100 INJECTION, SOLUTION INTRAVENOUS; SUBCUTANEOUS at 16:35

## 2024-10-01 RX ADMIN — CEFTRIAXONE 1000 MG: 1 INJECTION, SOLUTION INTRAVENOUS at 11:41

## 2024-10-01 RX ADMIN — CLOPIDOGREL 300 MG: 75 TABLET ORAL at 11:42

## 2024-10-01 RX ADMIN — INSULIN GLARGINE 10 UNITS: 100 INJECTION, SOLUTION SUBCUTANEOUS at 22:06

## 2024-10-01 RX ADMIN — ASPIRIN 325 MG: 325 TABLET ORAL at 11:42

## 2024-10-01 RX ADMIN — LISINOPRIL 20 MG: 20 TABLET ORAL at 08:58

## 2024-10-01 RX ADMIN — SODIUM CHLORIDE SOLN NEBU 3% 4 ML: 3 NEBU SOLN at 19:47

## 2024-10-01 RX ADMIN — FAMOTIDINE 20 MG: 20 TABLET, FILM COATED ORAL at 08:58

## 2024-10-01 RX ADMIN — ATORVASTATIN CALCIUM 40 MG: 40 TABLET, FILM COATED ORAL at 17:46

## 2024-10-01 RX ADMIN — ENOXAPARIN SODIUM 40 MG: 40 INJECTION SUBCUTANEOUS at 08:57

## 2024-10-01 RX ADMIN — METHYLPREDNISOLONE SODIUM SUCCINATE 40 MG: 40 INJECTION, POWDER, FOR SOLUTION INTRAMUSCULAR; INTRAVENOUS at 22:05

## 2024-10-01 RX ADMIN — SODIUM CHLORIDE 100 ML/HR: 0.9 INJECTION, SOLUTION INTRAVENOUS at 16:36

## 2024-10-01 RX ADMIN — LISINOPRIL 20 MG: 20 TABLET ORAL at 22:05

## 2024-10-01 RX ADMIN — GUAIFENESIN 600 MG: 600 TABLET ORAL at 17:46

## 2024-10-01 RX ADMIN — SODIUM CHLORIDE SOLN NEBU 3% 4 ML: 3 NEBU SOLN at 07:02

## 2024-10-01 RX ADMIN — FAMOTIDINE 20 MG: 20 TABLET, FILM COATED ORAL at 17:46

## 2024-10-01 RX ADMIN — INSULIN LISPRO 8 UNITS: 100 INJECTION, SOLUTION INTRAVENOUS; SUBCUTANEOUS at 22:06

## 2024-10-01 RX ADMIN — AMLODIPINE BESYLATE 5 MG: 5 TABLET ORAL at 22:05

## 2024-10-01 RX ADMIN — ATENOLOL 25 MG: 25 TABLET ORAL at 08:57

## 2024-10-01 RX ADMIN — LEVALBUTEROL HYDROCHLORIDE 1.25 MG: 1.25 SOLUTION RESPIRATORY (INHALATION) at 19:47

## 2024-10-01 RX ADMIN — SODIUM CHLORIDE SOLN NEBU 3% 4 ML: 3 NEBU SOLN at 13:16

## 2024-10-01 RX ADMIN — INSULIN LISPRO 6 UNITS: 100 INJECTION, SOLUTION INTRAVENOUS; SUBCUTANEOUS at 08:02

## 2024-10-01 RX ADMIN — INSULIN LISPRO 8 UNITS: 100 INJECTION, SOLUTION INTRAVENOUS; SUBCUTANEOUS at 11:44

## 2024-10-01 RX ADMIN — METHYLPREDNISOLONE SODIUM SUCCINATE 40 MG: 40 INJECTION, POWDER, FOR SOLUTION INTRAMUSCULAR; INTRAVENOUS at 08:56

## 2024-10-01 RX ADMIN — GUAIFENESIN 600 MG: 600 TABLET ORAL at 08:58

## 2024-10-01 NOTE — PLAN OF CARE
Problem: Potential for Falls  Goal: Patient will remain free of falls  Description: INTERVENTIONS:  - Educate patient/family on patient safety including physical limitations  - Instruct patient to call for assistance with activity   - Consult OT/PT to assist with strengthening/mobility   - Keep Call bell within reach  - Keep bed low and locked with side rails adjusted as appropriate  - Keep care items and personal belongings within reach  - Initiate and maintain comfort rounds  - Make Fall Risk Sign visible to staff  - Offer Toileting every 2 Hours, in advance of need  - Initiate/Maintain bed alarm  - Obtain necessary fall risk management equipment: nonslip socks  - Apply yellow socks and bracelet for high fall risk patients  - Consider moving patient to room near nurses station  Outcome: Progressing     Problem: PAIN - ADULT  Goal: Verbalizes/displays adequate comfort level or baseline comfort level  Description: Interventions:  - Encourage patient to monitor pain and request assistance  - Assess pain using appropriate pain scale  - Administer analgesics based on type and severity of pain and evaluate response  - Implement non-pharmacological measures as appropriate and evaluate response  - Consider cultural and social influences on pain and pain management  - Notify physician/advanced practitioner if interventions unsuccessful or patient reports new pain  Outcome: Progressing     Problem: INFECTION - ADULT  Goal: Absence or prevention of progression during hospitalization  Description: INTERVENTIONS:  - Assess and monitor for signs and symptoms of infection  - Monitor lab/diagnostic results  - Monitor all insertion sites, i.e. indwelling lines, tubes, and drains  - Monitor endotracheal if appropriate and nasal secretions for changes in amount and color  - [place] appropriate cooling/warming therapies per order  - Administer medications as ordered  - Instruct and encourage patient and family to use good hand  hygiene technique  - Identify and instruct in appropriate isolation precautions for identified infection/condition  Outcome: Progressing  Goal: Absence of fever/infection during neutropenic period  Description: INTERVENTIONS:  - Monitor WBC    Outcome: Progressing     Problem: SAFETY ADULT  Goal: Patient will remain free of falls  Description: INTERVENTIONS:  - Educate patient/family on patient safety including physical limitations  - Instruct patient to call for assistance with activity   - Consult OT/PT to assist with strengthening/mobility   - Keep Call bell within reach  - Keep bed low and locked with side rails adjusted as appropriate  - Keep care items and personal belongings within reach  - Initiate and maintain comfort rounds  - Make Fall Risk Sign visible to staff  - Offer Toileting every 2 Hours, in advance of need  - Initiate/Maintain bed alarm  - Obtain necessary fall risk management equipment: nonslip socks  - Apply yellow socks and bracelet for high fall risk patients  - Consider moving patient to room near nurses station  Outcome: Progressing  Goal: Maintain or return to baseline ADL function  Description: INTERVENTIONS:  -  Assess patient's ability to carry out ADLs; assess patient's baseline for ADL function and identify physical deficits which impact ability to perform ADLs (bathing, care of mouth/teeth, toileting, grooming, dressing, etc.)  - Assess/evaluate cause of self-care deficits   - Assess range of motion  - Assess patient's mobility; develop plan if impaired  - Assess patient's need for assistive devices and provide as appropriate  - Encourage maximum independence but intervene and supervise when necessary  - Involve family in performance of ADLs  - Assess for home care needs following discharge   - Consider OT consult to assist with ADL evaluation and planning for discharge  - Provide patient education as appropriate  Outcome: Progressing  Goal: Maintains/Returns to pre admission  functional level  Description: INTERVENTIONS:  - Perform AM-PAC 6 Click Basic Mobility/ Daily Activity assessment daily.  - Set and communicate daily mobility goal to care team and patient/family/caregiver.   - Collaborate with rehabilitation services on mobility goals if consulted  - Perform Range of Motion 3 times a day.  - Reposition patient every 2 hours.  - Dangle patient 3 times a day  - Stand patient 3 times a day  - Ambulate patient 3 times a day  - Out of bed to chair 3 times a day   - Out of bed for meals 3 times a day  - Out of bed for toileting  - Record patient progress and toleration of activity level   Outcome: Progressing     Problem: DISCHARGE PLANNING  Goal: Discharge to home or other facility with appropriate resources  Description: INTERVENTIONS:  - Identify barriers to discharge w/patient and caregiver  - Arrange for needed discharge resources and transportation as appropriate  - Identify discharge learning needs (meds, wound care, etc.)  - Arrange for interpretive services to assist at discharge as needed  - Refer to Case Management Department for coordinating discharge planning if the patient needs post-hospital services based on physician/advanced practitioner order or complex needs related to functional status, cognitive ability, or social support system  Outcome: Progressing     Problem: Knowledge Deficit  Goal: Patient/family/caregiver demonstrates understanding of disease process, treatment plan, medications, and discharge instructions  Description: Complete learning assessment and assess knowledge base.  Interventions:  - Provide teaching at level of understanding  - Provide teaching via preferred learning methods  Outcome: Progressing     Problem: Nutrition/Hydration-ADULT  Goal: Nutrient/Hydration intake appropriate for improving, restoring or maintaining nutritional needs  Description: Monitor and assess patient's nutrition/hydration status for malnutrition. Collaborate with  interdisciplinary team and initiate plan and interventions as ordered.  Monitor patient's weight and dietary intake as ordered or per policy. Utilize nutrition screening tool and intervene as necessary. Determine patient's food preferences and provide high-protein, high-caloric foods as appropriate.     INTERVENTIONS:  - Monitor oral intake, urinary output, labs, and treatment plans  - Assess nutrition and hydration status and recommend course of action  - Evaluate amount of meals eaten  - Assist patient with eating if necessary   - Allow adequate time for meals  - Recommend/ encourage appropriate diets, oral nutritional supplements, and vitamin/mineral supplements  - Order, calculate, and assess calorie counts as needed  - Recommend, monitor, and adjust tube feedings and TPN/PPN based on assessed needs  - Assess need for intravenous fluids  - Provide specific nutrition/hydration education as appropriate  - Include patient/family/caregiver in decisions related to nutrition  Outcome: Progressing     Problem: Neurological Deficit  Goal: Neurological status is stable or improving  Description: Interventions:  - Monitor and assess patient's level of consciousness, motor function, sensory function, and level of assistance needed for ADLs.   - Monitor and report changes from baseline. Collaborate with interdisciplinary team to initiate plan and implement interventions as ordered.   - Provide and maintain a safe environment.  - Consider seizure precautions.  - Consider fall precautions.  - Consider aspiration precautions.  - Consider bleeding precautions.  Outcome: Progressing     Problem: Activity Intolerance/Impaired Mobility  Goal: Mobility/activity is maintained at optimum level for patient  Description: Interventions:  - Assess and monitor patient  barriers to mobility and need for assistive/adaptive devices.  - Assess patient's emotional response to limitations.  - Collaborate with interdisciplinary team and initiate  plans and interventions as ordered.  - Encourage independent activity per ability.  - Maintain proper body alignment.  - Perform active/passive rom as tolerated/ordered.  - Plan activities to conserve energy.  - Turn patient as appropriate  Outcome: Progressing     Problem: Communication Impairment  Goal: Ability to express needs and understand communication  Description: Assess patient's communication skills and ability to understand information.  Patient will demonstrate use of effective communication techniques, alternative methods of communication and understanding even if not able to speak.     - Encourage communication and provide alternate methods of communication as needed.  - Collaborate with case management/ for discharge needs.  - Include patient/family/caregiver in decisions related to communication.  Outcome: Progressing     Problem: Potential for Aspiration  Goal: Non-ventilated patient's risk of aspiration is minimized  Description: Assess and monitor vital signs, respiratory status, and labs (WBC).  Monitor for signs of aspiration (tachypnea, cough, rales, wheezing, cyanosis, fever).    - Assess and monitor patient's ability to swallow.  - Place patient up in chair to eat if possible.  - HOB up at 90 degrees to eat if unable to get patient up into chair.  - Supervise patient during oral intake.   - Instruct patient/ family to take small bites.  - Instruct patient/ family to take small single sips when taking liquids.  - Follow patient-specific strategies generated by speech pathologist.  Outcome: Progressing  Goal: Ventilated patient's risk of aspiration is minimized  Description: Assess and monitor vital signs, respiratory status, airway cuff pressure, and labs (WBC).  Monitor for signs of aspiration (tachypnea, cough, rales, wheezing, cyanosis, fever).    - Elevate head of bed 30 degrees if patient has tube feeding.  - Monitor tube feeding.  Outcome: Progressing     Problem:  Nutrition  Goal: Nutrition/Hydration status is improving  Description: Monitor and assess patient's nutrition/hydration status for malnutrition (ex- brittle hair, bruises, dry skin, pale skin and conjunctiva, muscle wasting, smooth red tongue, and disorientation). Collaborate with interdisciplinary team and initiate plan and interventions as ordered.  Monitor patient's weight and dietary intake as ordered or per policy. Utilize nutrition screening tool and intervene per policy. Determine patient's food preferences and provide high-protein, high-caloric foods as appropriate.     - Assist patient with eating.  - Allow adequate time for meals.  - Encourage patient to take dietary supplement as ordered.  - Collaborate with clinical nutritionist.  - Include patient/family/caregiver in decisions related to nutrition.  Outcome: Progressing

## 2024-10-01 NOTE — PROGRESS NOTES
Progress Note - Hospitalist   Name: Gold Van 78 y.o. male I MRN: [id number]  Unit/Bed#: -01 I Date of Admission: 9/29/2024   Date of Service: 10/1/2024 I Hospital Day: 2    Assessment & Plan  Acute respiratory failure with hypoxia (HCC)  Arrived in suspected COPD exacerbation/pneumonia on CPAP, later required BiPAP, decreased to nasal cannula now saturating well on room air.  Resolved  Treat suspected underlying cause of COPD exacerbation and suspected pneumonia  Monitor oxygen requirements and wean as tolerated  Pulmonology recommendations appreciated.  Continue antibiotics, switch to 3% sodium chloride/Xopenex nebulizer tid, decrease steroids to twice daily  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HCC)  Suspected COPD exacerbation, lung sounds decreased on exam. Improved  Arrived to ER on CPAP, transferred over to BiPAP for 2 hours .  Received IV steroids while in the ER   Evaluated by critical care who felt that patient is stable for medical admission  Continue Solu-Medrol 40 mg IV to BID   Oxygen and respiratory protocol  Continue nebulized medication  Continue Mucinex, continue Singulair  Conduct serial physical assessments  Sepsis due to pneumonia (HCC)  Met sepsis criteria with tachycardia and leukocytosis (noted to be on chronic steroids)  Imaging with new left lingular markings  Lactic acid 2.6-->1.3 without intervention   Started on ceftriaxone and azithromycin in the ER, continue, D3  Blood cultures in progress  Monitor labs and vital signs  Obtain PT OT consultation  Type 2 diabetes mellitus with complication, without long-term current use of insulin (HCC)  Lab Results   Component Value Date    HGBA1C 9.7 (H) 09/24/2024       Recent Labs     09/30/24  1725 09/30/24  2134 10/01/24  0729 10/01/24  1128   POCGLU 401* 282* 251* 305*       Blood Sugar Average: Last 72 hrs:  (P) 335.375    Diabetic diet  Fingerstick blood sugar with sliding scale coverage, increased to algorithm 4 in the setting of steroid-induced  hyperglycemia.  With steroids decreased to twice daily anticipate improvement glucose  Continue Lantus 10 units at bedtime  Hold home glipizide and metformin, resume on discharge  Monitor glucose and adjust medication as needed  Hypoglycemia protocol  Essential hypertension  Continue amlodipine 5 mg p.o. twice daily and atenolol 25 mg p.o. daily  Monitor blood pressure  COVID-19  Day 11 post positive test, hospitalized 9/24/2024, received remdesivir and baricitinib  Can go off of precautions  Pruritus  Takes prednisone chronically 10 mg alternating with 15 mg.  Oral steroids on hold while receiving IV Solu-Medrol  Dysmetria  Patient evaluated by therapy today who noted problems with coordination with the right leg.  Unclear onset as patient's wife relayed that patient had deconditioning/altered gait at home where he was leaning on furniture to walk after his hospital discharge  Discussed with neurology, assisted with bedside evaluation.  Recommendations noted.  Will obtain CTA head and neck, MRI brain, start aspirin and clopidogrel  Telemetry monitoring  Neuro checks  Start statin  Check lipid panel and hemoglobin A1c  No need for permissive hypertension      VTE Pharmacologic Prophylaxis: VTE Score: 6 High Risk (Score >/= 5) - Pharmacological DVT Prophylaxis Ordered: enoxaparin (Lovenox). Sequential Compression Devices Ordered.    Mobility:   Basic Mobility Inpatient Raw Score: 18  JH-HLM Goal: 6: Walk 10 steps or more  JH-HLM Achieved: 7: Walk 25 feet or more  JH-HLM Goal achieved. Continue to encourage appropriate mobility.    Patient Centered Rounds: I performed bedside rounds with nursing staff today.   Discussions with Specialists or Other Care Team Provider: Neurology    Education and Discussions with Family / Patient: Updated  (wife) via phone.  Will also meet her at bedside when she arrives.    Current Length of Stay: 2 day(s)  Current Patient Status: Inpatient   Certification Statement: The  patient will continue to require additional inpatient hospital stay due to acute respiratory failure, COPD with exacerbation, dysmetria.  Discharge Plan: Anticipate discharge in 48 hrs to rehab facility.    Code Status: Level 1 - Full Code    Subjective   Patient evaluated.  He feels a little better, his breathing is better, appetite fair. He is reporting that his right arm is bothering him and he has it propped on a pillow.  Denies fever, myalgia, chest pain, abdominal pain, back pain, dizziness, visual changes.      Objective     Vitals:   Temp (24hrs), Av.5 °F (36.4 °C), Min:97.4 °F (36.3 °C), Max:97.6 °F (36.4 °C)    Temp:  [97.4 °F (36.3 °C)-97.6 °F (36.4 °C)] 97.6 °F (36.4 °C)  HR:  [72-97] 72  Resp:  [16-19] 16  BP: (124-150)/(60-78) 126/74  SpO2:  [89 %-97 %] 97 %  Body mass index is 21.43 kg/m².     Input and Output Summary (last 24 hours):     Intake/Output Summary (Last 24 hours) at 10/1/2024 1219  Last data filed at 10/1/2024 0837  Gross per 24 hour   Intake 1560 ml   Output 700 ml   Net 860 ml       Physical Exam  Vitals and nursing note reviewed.   Constitutional:       General: He is not in acute distress.  HENT:      Head: Normocephalic and atraumatic.      Nose: Nose normal.      Mouth/Throat:      Mouth: Mucous membranes are moist.      Pharynx: Oropharynx is clear.   Eyes:      General: No visual field deficit.     Pupils: Pupils are equal, round, and reactive to light.   Cardiovascular:      Rate and Rhythm: Normal rate.      Pulses: Normal pulses.      Heart sounds: Normal heart sounds.   Pulmonary:      Effort: Pulmonary effort is normal.      Breath sounds: Normal breath sounds.   Abdominal:      General: Abdomen is flat. Bowel sounds are normal.      Palpations: Abdomen is soft.   Musculoskeletal:      Right lower leg: No edema.      Left lower leg: No edema.   Skin:     General: Skin is warm and dry.   Neurological:      General: No focal deficit present.      Mental Status: He is alert  and oriented to person, place, and time.      Cranial Nerves: No cranial nerve deficit, dysarthria or facial asymmetry.      Sensory: No sensory deficit.      Motor: No weakness.      Coordination: Coordination abnormal. Finger-Nose-Finger Test abnormal and Heel to Shin Test abnormal.   Psychiatric:         Mood and Affect: Mood normal.         Behavior: Behavior normal.          Lines/Drains:  Lines/Drains/Airways       Active Status       None                      Telemetry:  Telemetry Orders (From admission, onward)               24 Hour Telemetry Monitoring  Continuous x 24 Hours (Telem)        Question:  Reason for 24 Hour Telemetry  Answer:  TIA/Suspected CVA/ Confirmed CVA                                Lab Results: I have reviewed the following results: CBC/BMP:   .     10/01/24  0421   WBC 10.90*   HGB 11.6*   HCT 35.1*      SODIUM 133*   K 4.7      CO2 23   BUN 28*   CREATININE 0.90   GLUC 253*       Results from last 7 days   Lab Units 10/01/24  0421 09/30/24  0448 09/29/24  1022   WBC Thousand/uL 10.90* 11.07* 15.13*   HEMOGLOBIN g/dL 11.6* 11.5* 13.5   HEMATOCRIT % 35.1* 34.5* 39.8   PLATELETS Thousands/uL 235 223 275   SEGS PCT %  --   --  86*   LYMPHO PCT %  --  5* 9*   MONO PCT %  --  2* 4   EOS PCT %  --  0 0     Results from last 7 days   Lab Units 10/01/24  0421 09/30/24  0448 09/29/24  1022   SODIUM mmol/L 133*   < > 134*   POTASSIUM mmol/L 4.7   < > 4.3   CHLORIDE mmol/L 101   < > 98   CO2 mmol/L 23   < > 25   BUN mg/dL 28*   < > 23   CREATININE mg/dL 0.90   < > 1.07   ANION GAP mmol/L 9   < > 11   CALCIUM mg/dL 8.7   < > 9.3   ALBUMIN g/dL  --   --  3.4*   TOTAL BILIRUBIN mg/dL  --   --  0.87   ALK PHOS U/L  --   --  59   ALT U/L  --   --  21   AST U/L  --   --  13   GLUCOSE RANDOM mg/dL 253*   < > 279*    < > = values in this interval not displayed.     Results from last 7 days   Lab Units 09/29/24  1022   INR  1.01     Results from last 7 days   Lab Units 10/01/24  1128  10/01/24  0729 09/30/24  2134 09/30/24  1725 09/30/24  1613 09/30/24  1107 09/30/24  0753 09/29/24  1631 09/26/24  1110 09/26/24  0749 09/25/24  2113 09/25/24  1640   POC GLUCOSE mg/dl 305* 251* 282* 401* 442* 387* 217* 398* 367* 343* 300* 361*         Results from last 7 days   Lab Units 10/01/24  0421 09/29/24  1406 09/29/24  1031 09/29/24  1022 09/26/24  0443 09/25/24  0457   LACTIC ACID mmol/L  --  1.3 2.6*  --   --   --    PROCALCITONIN ng/ml 0.41*  --   --  0.19 1.15* 1.84*       Recent Cultures (last 7 days):   Results from last 7 days   Lab Units 09/29/24  1022   BLOOD CULTURE  Staphylococcus coagulase negative*  No Growth at 24 hrs.   GRAM STAIN RESULT  Gram positive cocci in clusters*       Last 24 Hours Medication List:     Current Facility-Administered Medications:     acetaminophen (TYLENOL) tablet 650 mg, Q6H PRN    albuterol (PROVENTIL HFA,VENTOLIN HFA) inhaler 2 puff, Q4H PRN    albuterol inhalation solution 2.5 mg, Q4H PRN    amLODIPine (NORVASC) tablet 5 mg, BID    [START ON 10/2/2024] aspirin chewable tablet 81 mg, Daily    atenolol (TENORMIN) tablet 25 mg, Daily    atorvastatin (LIPITOR) tablet 40 mg, QPM    azithromycin (ZITHROMAX) tablet 500 mg, Q24H    benzonatate (TESSALON PERLES) capsule 100 mg, TID PRN    cefTRIAXone (ROCEPHIN) IVPB (premix in dextrose) 1,000 mg 50 mL, Q24H, Last Rate: 1,000 mg (10/01/24 1141)    [START ON 10/2/2024] clopidogrel (PLAVIX) tablet 75 mg, Daily    enoxaparin (LOVENOX) subcutaneous injection 40 mg, Daily    famotidine (PEPCID) tablet 20 mg, BID    guaiFENesin (MUCINEX) 12 hr tablet 600 mg, BID    insulin glargine (LANTUS) subcutaneous injection 10 Units 0.1 mL, HS    insulin lispro (HumALOG/ADMELOG) 100 units/mL subcutaneous injection 2-12 Units, TID AC **AND** Fingerstick Glucose (POCT), TID AC    insulin lispro (HumALOG/ADMELOG) 100 units/mL subcutaneous injection 2-12 Units, HS    levalbuterol (XOPENEX) inhalation solution 1.25 mg, TID    lisinopril  (ZESTRIL) tablet 20 mg, BID    LORazepam (ATIVAN) tablet 0.5 mg, HS PRN    methylPREDNISolone sodium succinate (Solu-MEDROL) injection 40 mg, Q12H SANDRA    montelukast (SINGULAIR) tablet 10 mg, HS    sodium chloride 3 % inhalation solution 4 mL, TID    Administrative Statements   Today, Patient Was Seen By: BELGICA Thompson  I have spent a total time of 60 minutes in caring for this patient on the day of the visit/encounter including Prognosis, Risks and benefits of tx options, Patient and family education, Impressions, Counseling / Coordination of care, Documenting in the medical record, Reviewing / ordering tests, medicine, procedures  , Obtaining or reviewing history  , and Communicating with other healthcare professionals .    **Please Note: This note may have been constructed using a voice recognition system.**

## 2024-10-01 NOTE — QUICK NOTE
Contacted neurologist to review findings.  Patient with multifocal infarcts bilaterally and multifocal intracranial atherosclerotic disease. At least some of these infarcts appear watershed appearance and has severe stenosis in both intracranial ICAs.  This is likely due to episodes of hypotension or hypoperfusion he may have had during his illness.  Other infarcts appear embolic although unclear etiology.  Obtain CT chest abdomen pelvis with and without to evaluate for occult malignancy.  Hypercoagulability from COVID could have played a role as well.    Discussed this in depth with patient and his wife at bedside.  Patient's wife appears overwhelmed, she thanked us for the thoroughness, but said it is so much information to process and she did not want any more right now.  Support and reassurance provided to both her and patient.  I let them know that order will be placed for CT chest abdomen pelvis to evaluate for malignancy as above.  I also told him it would likely not be until tomorrow as patient has received dye load once already today.  Will start normal saline at 100 mL an hour for 15 hours.  Echocardiogram results appreciated, cardiology consult placed.    Pulmonology input appreciated: Wean steroids quickly, start prednisone 40 mg p.o. tomorrow for 3 days then stop

## 2024-10-01 NOTE — CASE MANAGEMENT
Case Management Assessment & Discharge Planning Note    Patient name Gold Van  Location /-01 MRN [id number]  : 1945 Date 10/1/2024       Current Admission Date: 2024  Current Admission Diagnosis: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HCC)   Patient Active Problem List    Diagnosis Date Noted Date Diagnosed    Dysmetria 10/01/2024     COVID-19 2024     Acute respiratory insufficiency 2024     Shortness of breath 2024     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HCC) 2024     History of pneumothorax 2024     Acute respiratory failure with hypoxia (HCC) 2024     Non-recurrent bilateral inguinal hernia without obstruction or gangrene 2024     Pruritus 2024     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 2021     Sepsis due to pneumonia ([facility]) 2021     Hyponatremia 2021     Pulmonary nodule 2021     Type 2 diabetes mellitus with complication, without long-term current use of insulin ([facility]) 10/23/2017     Essential hypertension 10/23/2017     Asthma 10/23/2017     Hyperlipidemia 10/23/2017       LOS (days): 2  Geometric Mean LOS (GMLOS) (days): 5.1  Days to GMLOS:3     OBJECTIVE:  PATIENT READMITTED TO HOSPITAL  Risk of Unplanned Readmission Score: 24.52         Current admission status: Inpatient  Referral Reason: Other (D/C planning)    Preferred Pharmacy:   [facility] - [facility] [address] [address] [facility] [address]  Phone: [phone] Fax: [phone]    [facility] - [place], [place] - [address]  [address] [facility] [address]  Phone: [phone] Fax: [phone]    Primary Care Provider: Shayne Diaz MD    Primary Insurance: [employer]  REP  Secondary Insurance:     ASSESSMENT:  Active Health Care Proxies       Mya Van Health Care Representative - Spouse   Primary Phone: [phone] (Mobile)  Home Phone: [phone]                 Advance  Directives  Does patient have a Health Care POA?: Yes  Does patient have Advance Directives?: Yes  Primary Contact: Mya Van         Readmission Root Cause  30 Day Readmission: No    Patient Information  Admitted from:: Home  Mental Status: Alert  During Assessment patient was accompanied by: Not accompanied during assessment  Assessment information provided by:: Spouse  Primary Caregiver: Self  Support Systems: Spouse/significant other  County of Residence: [place]  What city do you live in?: [place]  Home entry access options. Select all that apply.: Stairs  Number of steps to enter home.: One Flight  Do the steps have railings?: Yes  Type of Current Residence: [facility]  Living Arrangements: Lives w/ Spouse/significant other  Is patient a ?: No    Activities of Daily Living Prior to Admission  Functional Status: Independent  Completes ADLs independently?: Yes  Ambulates independently?: No  Level of ambulatory dependence: Assistance  Does patient use assisted devices?: Yes  Assisted Devices (DME) used: Nebulizer, Other (Comment) (Walking stick)  DME Company Name (respiratory supplies): Unknown  Does patient currently own DME?: Yes  What DME does the patient currently own?: Walker  Does patient have a history of Outpatient Therapy (PT/OT)?: No  Does the patient have a history of Short-Term Rehab?: No  Does patient have a history of HHC?: No  Does patient currently have HHC?: No         Patient Information Continued  Income Source: Pension/skilled nursing  Does patient have prescription coverage?: Yes  Does patient receive dialysis treatments?: No  Does patient have a history of substance abuse?: No  Does patient have a history of Mental Health Diagnosis?: No         Means of Transportation  Means of Transport to Appts:: Family transport      Social Determinants of Health (SDOH)      Flowsheet Row Most Recent Value   Housing Stability    In the last 12 months, was there a time when you were not able to pay  the mortgage or rent on time? N   In the past 12 months, how many times have you moved where you were living? 0   At any time in the past 12 months, were you homeless or living in a shelter (including now)? N   Transportation Needs    In the past 12 months, has lack of transportation kept you from medical appointments or from getting medications? no   In the past 12 months, has lack of transportation kept you from meetings, work, or from getting things needed for daily living? No   Food Insecurity    Within the past 12 months, you worried that your food would run out before you got the money to buy more. Never true   Within the past 12 months, the food you bought just didn't last and you didn't have money to get more. Never true   Utilities    In the past 12 months has the electric, gas, oil, or water company threatened to shut off services in your home? No            DISCHARGE DETAILS:    Discharge planning discussed with:: Pt's wife  Freedom of Choice: Yes     CM contacted family/caregiver?: Yes  Were Treatment Team discharge recommendations reviewed with patient/caregiver?: Yes  Did patient/caregiver verbalize understanding of patient care needs?: Yes  Were patient/caregiver advised of the risks associated with not following Treatment Team discharge recommendations?: Yes    Contacts  Patient Contacts: Mya Van  Relationship to Patient:: Family  Contact Method: Phone  Phone Number: [phone]  Reason/Outcome: Continuity of Care, Discharge Planning              Other Referral/Resources/Interventions Provided:  Interventions: SNF, HHC  Referral Comments: CM contacted pt's wife to conduct assessment. CM reviewed HHC and STR choices. Pt's wife requested time to discuss with SLIM & family, and reported that she will touch base in the afternoon. CM attempted to contact pt's wife this afternoon, and left VM. CM will continue to follow.    Would you like to participate in our [facility] service program?   : No - Declined       Discharge Destination Plan::  (HHC vs STR)  Transport at Discharge :  (Wife vs BLS)

## 2024-10-01 NOTE — ASSESSMENT & PLAN NOTE
Patient evaluated by therapy today who noted problems with coordination with the right leg.  Unclear onset as patient's wife relayed that patient had deconditioning/altered gait at home where he was leaning on furniture to walk after his hospital discharge  Discussed with neurology, assisted with bedside evaluation.  Recommendations noted.  Will obtain CTA head and neck, MRI brain, start aspirin and clopidogrel  Telemetry monitoring  Neuro checks  Start statin  Check lipid panel and hemoglobin A1c  No need for permissive hypertension

## 2024-10-01 NOTE — ASSESSMENT & PLAN NOTE
Lab Results   Component Value Date    HGBA1C 9.7 (H) 09/24/2024       Recent Labs     09/30/24  1725 09/30/24  2134 10/01/24  0729 10/01/24  1128   POCGLU 401* 282* 251* 305*       Blood Sugar Average: Last 72 hrs:  (P) 335.375    Diabetic diet  Fingerstick blood sugar with sliding scale coverage, increased to algorithm 4 in the setting of steroid-induced hyperglycemia.  With steroids decreased to twice daily anticipate improvement glucose  Continue Lantus 10 units at bedtime  Hold home glipizide and metformin, resume on discharge  Monitor glucose and adjust medication as needed  Hypoglycemia protocol

## 2024-10-01 NOTE — QUICK NOTE
Neurology Note    CTA head/neck and MRI brain were personally reviewed - significant, diffuse intracranial atherosclerotic disease with severe stenosis in b/l ICAs and b/l verts along with moderate stenosis in basilar; extracranial disease with moderate b/l proximal ICA disease; multifocal bihemispheric (b/l frontoparietal) infarcts noted with at least some appearing watershed. TTE was unremarkable.    Given the watershed appearance of at least some of his infarcts, there is a good chance at least some of this is due to an episode of hypotension or hypoperfusion he may have had during his illness, especially given the presence of significant b/l intracranial stenoses. Other infarcts look more embolic appearing but of unclear source.    Recommend DAPT x 90 days total then ASA monotherapy. Continue the high intensity statin. Avoid hypotension as much as possible and ensure pt remains hydrated. Recommend pan-CT w/wo to evaluate for underlying malignancy - his elevated D-dimer previously is more likely due to the COVID but cannot entirely rule out the possibility. That said, hypercoagulability from COVID could have played a role as well. If his workup above is unremarkable and his telemetry during admission is without arrhythmia, would recommend a Zio patch and/or loop recorder on discharge. He will follow in stroke clinic.    Gold Van will need follow-up in in 6 weeks with neurovascular team for Other in 60 minute appointment. They will not require outpatient neurological testing.

## 2024-10-01 NOTE — ASSESSMENT & PLAN NOTE
79 y/o M with HTN, HLD, DM, and COPD that presents with new onset of R-sided appendicular ataxia and truncal ataxia in the setting of COPD exacerbation and recent COVID infection with concern for acute ischemic stroke.    -continue neurochecks; notify with changes  -obtain CTA head/neck  -obtain MRI brain w/o contrast  -TTE results pending  -telemetry  -load with 325mg ASA and 300mg Plavix; continue DAPT for now  -Atorvastatin 40mg daily  -no need for permissive HTN; deficits likely present for days and pt has been normotensive  -check lipid panel; recent A1c on 9/24 was 9.7  -elevated D-dimer 1.73 on 9/24 in the setting of COVID diagnosis  -management of COVID and COPD exacerbation as per primary team  -PT/OT evals  -will follow results; call with questions

## 2024-10-01 NOTE — ASSESSMENT & PLAN NOTE
Met sepsis criteria with tachycardia and leukocytosis (noted to be on chronic steroids)  Imaging with new left lingular markings  Lactic acid 2.6-->1.3 without intervention   Started on ceftriaxone and azithromycin in the ER, continue, D3  Blood cultures in progress  Monitor labs and vital signs  Obtain PT OT consultation

## 2024-10-01 NOTE — PLAN OF CARE
Problem: PHYSICAL THERAPY ADULT  Goal: Performs mobility at highest level of function for planned discharge setting.  See evaluation for individualized goals.  Description: Treatment/Interventions: Functional transfer training, LE strengthening/ROM, Elevations, Therapeutic exercise, Endurance training, Gait training          See flowsheet documentation for full assessment, interventions and recommendations.  Outcome: Progressing  Note: Prognosis: Good  Problem List: Decreased strength, Decreased endurance, Impaired balance, Decreased mobility  Assessment: Pt is 78 y.o. male seen for PT evaluation s/p admit to [facility] on 9/29/2024 w/ Dysmetria. PT consulted to assess pt's functional mobility and d/c needs. Order placed for PT eval and tx, w/ up and OOB as tolerated order. Pt agreeable to PT  session upon arrival, pt found seated OOB in recliner.  PTA, pt was independent w/ all functional mobility w/ walking stick, lives w/ spouse in 2 level home, and retired.  Pt to benefit from continued PT tx to address deficits and maximize level of functional independent mobility and consistency. Upon conclusion pt  seated in recliner. Complexity: Comorbidities affecting pt's physical performance at time of assessment include: COPD, DM, htn, and respiratory failure. Personal factors affecting pt at time of IE include: advanced age, lives in multistory home, ambulating with assistive device, and steps to enter home. Please find objective findings from PT assessment regarding body systems outlined above with impairments and limitations including weakness, impaired balance, impaired coordination, and fall risk.  Pt's clinical presentation is currently unstable/unpredictable seen in pt's presentation of abnormal renal values, abnormal H&H, abnormal WBC count, abnormal sodium levels, and abnormal blood sugar levels. The patient's AM-PAC Basic Mobility Inpatient Short Form Raw Score is 18 .  Based on patient presentations  and impairments, pt would most appropriately benefit from Level 2 resource intensity upon discharge. Please also refer to the recommendation of the Physical Therapist for safe discharge planning. RN verbalized pt appropriate for PT session.        Rehab Resource Intensity Level, PT: II (Moderate Resource Intensity)    See flowsheet documentation for full assessment.

## 2024-10-01 NOTE — ASSESSMENT & PLAN NOTE
Arrived in suspected COPD exacerbation/pneumonia on CPAP, later required BiPAP, decreased to nasal cannula now saturating well on room air.  Resolved  Treat suspected underlying cause of COPD exacerbation and suspected pneumonia  Monitor oxygen requirements and wean as tolerated  Pulmonology recommendations appreciated.  Continue antibiotics, switch to 3% sodium chloride/Xopenex nebulizer tid, decrease steroids to twice daily

## 2024-10-01 NOTE — TELEMEDICINE
Tele-Consultation - Neurology   Name: Gold Van 78 y.o. male I MRN: [id number]  Unit/Bed#: -01 I Date of Admission: 9/29/2024   Date of Service: 10/1/2024 I Hospital Day: 2   Inpatient consult to Neurology  Consult performed by: Min Neal DO  Consult ordered by: BELGICA Thompson      Inpatient consult to Neurology  Consult performed by: Min Neal DO  Consult ordered by: BELGICA Thompson        Physician Requesting Evaluation: Binh Hurd DO   Reason for Evaluation / Principal Problem: incoordination    Assessment & Plan  Dysmetria  79 y/o M with HTN, HLD, DM, and COPD that presents with new onset of R-sided appendicular ataxia and truncal ataxia in the setting of COPD exacerbation and recent COVID infection with concern for acute ischemic stroke.    -continue neurochecks; notify with changes  -obtain CTA head/neck  -obtain MRI brain w/o contrast  -TTE results pending  -telemetry  -load with 325mg ASA and 300mg Plavix; continue DAPT for now  -Atorvastatin 40mg daily  -no need for permissive HTN; deficits likely present for days and pt has been normotensive  -check lipid panel; recent A1c on 9/24 was 9.7  -elevated D-dimer 1.73 on 9/24 in the setting of COVID diagnosis  -management of COVID and COPD exacerbation as per primary team  -PT/OT evals  -will follow results; call with questions    Recommendations for outpatient neurological follow up have yet to be determined.      HPI: Gold Van is a 78 y.o. male with HTN, HLD, DM, and COPD who initially presented on 9/29 and was admitted for acute respiratory failure with hypoxia due to COPD exacerbation in the setting of recent COVID infection. There was concern for pneumonia exacerbating his respiratory failure and he was placed on CTX and azithromycin as well as steroids for COPD. He initially required CPAP, then BiPAP, and then was able to be weaned to nasal cannula with improved respiratory function as of this morning.    PT  worked with the patient this morning and noted that he seemed less coordinated on the right side compared to his left. Pt himself began to complain that he felt he could not use his right arm as much as his left yesterday but also felt that he may have slept on it. He has been deconditioned from recent admission for COVID and now readmission.    When discussing with the pt, he states he last felt normal on Sunday morning 9/29, when he was able to walk around and use his arm well. He first noticed that he was having the difficulty on his right side yesterday 9/30 but also stated it may have been already abnormal on Sunday though he could not tell because he was feeling so sick. Given that deficits were >24 hrs per discussion with primary team/patient, a stroke alert was not recommended.    Review of Systems   Respiratory:  Positive for cough and shortness of breath.    Musculoskeletal:  Positive for gait problem.   Neurological:  Positive for weakness.   All other systems reviewed and are negative.       Objective      Temp:  [97.4 °F (36.3 °C)-97.6 °F (36.4 °C)] 97.5 °F (36.4 °C)  HR:  [74-97] 74  Resp:  [18-19] 18  BP: (124-150)/(60-78) 126/70  O2 Device: Nasal cannula          I/O last 24 hours:  In: 1560 [P.O.:1560]  Out: 1050 [Urine:1050]  Lines/Drains/Airways       Active Status       None                  Physical Exam  Constitutional:       Appearance: He is well-developed.   HENT:      Head: Normocephalic and atraumatic.   Eyes:      Extraocular Movements: EOM normal.      Conjunctiva/sclera: Conjunctivae normal.   Pulmonary:      Effort: No respiratory distress.   Abdominal:      General: There is no distension.   Musculoskeletal:         General: No swelling.      Cervical back: No rigidity.   Skin:     Coloration: Skin is not jaundiced.   Neurological:      Mental Status: He is alert and oriented to person, place, and time.   Psychiatric:         Speech: Speech normal.     Neurologic Exam     Mental  Status   Oriented to person, place, and time.   Attention: normal. Concentration: normal.   Speech: speech is normal   Level of consciousness: alert  Knowledge: good.   Able to name object. Able to repeat. Normal comprehension.     Cranial Nerves     CN II   Visual fields full to confrontation.     CN III, IV, VI   Extraocular motions are normal.     CN V   Facial sensation intact.     CN VII   Facial expression full, symmetric.     CN XII   Tongue deviation: none    Motor Exam     Strength   Strength 5/5 except as noted. Slight drift in RUE     Sensory Exam   Light touch normal.     Gait, Coordination, and Reflexes Gross dysmetria with FTN and HTS on R; normal on L       Lab Results: I have reviewed the following results: CBC/BMP:   .     10/01/24  0421   WBC 10.90*   HGB 11.6*   HCT 35.1*      SODIUM 133*   K 4.7      CO2 23   BUN 28*   CREATININE 0.90   GLUC 253*    , PTT/INR:No new results in last 24 hours.   Imaging Review: No pertinent imaging studies reviewed.  Other Studies: No additional pertinent studies reviewed.    VTE Prophylaxis: Enoxaparin (Lovenox)    VIRTUAL CARE DOCUMENTATION:     1. This service was provided via Telemedicine using [employer]     2. Parties in the room with patient during teleconsult Other: NP     3. Confidentiality My office door was closed     4. Participants No one else was in the room    5. Patient acknowledged consent and understanding of privacy and security of the  Telemedicine consult. I informed the patient that I have reviewed their record in Epic and presented the opportunity for them to ask any questions regarding the visit today.  The patient agreed to participate.    6. I have spent a total time of 45 minutes in caring for this patient on the day of the visit/encounter including Diagnostic results, Patient and family education, Impressions, Counseling / Coordination of care, Documenting in the medical record, Reviewing / ordering tests, medicine,  procedures  , Obtaining or reviewing history  , and Communicating with other healthcare professionals .

## 2024-10-01 NOTE — QUICK NOTE
To bedside to discuss results with patient and wife. All questions answered. Will reach out to neuro with results.

## 2024-10-01 NOTE — PLAN OF CARE
Problem: OCCUPATIONAL THERAPY ADULT  Goal: Performs self-care activities at highest level of function for planned discharge setting.  See evaluation for individualized goals.  Description: Treatment Interventions: ADL retraining, Functional transfer training, UE strengthening/ROM, Endurance training, Cognitive reorientation, Patient/family training, Equipment evaluation/education, Neuromuscular reeducation, Fine motor coordination activities, Compensatory technique education, Continued evaluation, Energy conservation, Activityengagement          See flowsheet documentation for full assessment, interventions and recommendations.   Note: Limitation: Decreased ADL status, Decreased UE strength, Decreased Safe judgement during ADL, Decreased cognition, Decreased endurance, Decreased sensation, Decreased self-care trans, Decreased high-level ADLs (dec coordination, balance, functional reach)  Prognosis: Good  Assessment: Patient is a 78 y.o. year old male seen for OT eval s/p admit to [facility] on 9/29/2024 with COPD w asthma, acute respiratory failure w hypoxia from recent COVID-19 infection. Currently presenting w neurological symptoms.  OT consulted to assess ADLs/IADLs/functional mobility and assist w/ D/C planning. Patient demonstrates the following deficits impacting occupational performance: decreased strength , decreased balance, decreased activity tolerance, limited functional reach, impaired GMC, impaired FMC, impaired sensation, decreased safety awareness, impaired coordination, decreased cardiovascular endurance, and decreased skin integrity . These impairments, as well at pt’s NEERU home environment, steps within home environment, difficulty performing ADLs, difficulty performing IADLs, difficulty performing transfers/mobility, fall risk , functional decline , new use of AD for functional transfers/mobility, advanced age, and multiple admissions , limit pt’s ability to safely engage in all baseline areas of  occupation. Pt's CLOF as follows: eating/grooming: supervision , UB ADLs: Virgie, LB ADLs: Virgie and modA, toileting: Virgie, bed mobility: DNT, functional transfers: Virgie, functional mobility: Virgie, sitting/standing tolerance: ~5 min. Pt would benefit from continued skilled OT while in acute setting to address deficits as defined above and to maximize (I) w/ ADLs/functional mobility. Occupational performance areas to address include: grooming, bathing/shower, toilet hygiene, dressing, medication management, health maintenance, functional mobility, community mobility, clothing management, cleaning, meal prep, and household maintenance. Based on the aforementioned evaluation, functional performance deficits, and assessments, pt has been identified as a high complexity evaluation. At this time, recommendation for pt to receive post-acute rehabilitation services at a Level II (moderate resource intensity) due to above deficits and CLOF. OT will continue to follow pt 3-5x/wk to address the goals listed below to  w/in 10-14 days.     Rehab Resource Intensity Level, OT: II (Moderate Resource Intensity)

## 2024-10-01 NOTE — ASSESSMENT & PLAN NOTE
Day 11 post positive test, hospitalized 9/24/2024, received remdesivir and baricitinib  Can go off of precautions

## 2024-10-01 NOTE — PROGRESS NOTES
Progress Note - Pulmonary   Gold Van 78 y.o. male MRN: [id number]  Unit/Bed#: -01 Encounter: [id number]      Assessment:  Acute hypoxic respiratory failure-likely from #2, 4, possible myocardial dysfunction/mild CHF given the TTE findings  Pneumonia-superimposed bacterial/postviral COVID-19  Recent COVID-19 tracheobronchitis  Asthma/COPD  last FEV1 of 50%/significant BD response  No signs of severe exacerbation  CHF  EF 50%/mild hypokinesis at the posterior wall  Possibly a new ACS/however troponin was negative x 3 on admission  Former tobacco abuse-30-pack-year quit in the 1990s  Acute CVA    Plan:  Continue azithromycin/ceftriaxone to complete 7 days  Follow sputum cultures results  PCT slightly up today, will repeat tomorrow and if trending up will consider expanding antibiotics  Ordered MRSA swab  Continue 3% sodium chloride/Xopenex via nebulizer tid  Wean steroids quickly/prednisone 40 mg from tomorrow for 3 days then stop, will discontinue Solu-Medrol today  Treatment for acute stroke/low EF as per the primary team/neurology/cardiology    We will continue to follow    Subjective:   Overnight: Noted new onset right-sided weakness//dysmetria, acute stroke noted confirmed on MRI multiple acute infarcts at the left frontal/parietal areas bilaterally.  Still feeling weakness on the right side. Feeling easier/thinner mucus to produce with intermittent cough.  Otherwise no change.    Vitals: Blood pressure 127/71, pulse 77, temperature 97.5 °F (36.4 °C), temperature source Oral, resp. rate 18, height 6' (1.829 m), weight 71.7 kg (158 lb), SpO2 96%., Body mass index is 21.43 kg/m².      Intake/Output Summary (Last 24 hours) at 10/1/2024 1551  Last data filed at 10/1/2024 1316  Gross per 24 hour   Intake 840 ml   Output 700 ml   Net 140 ml       Physical Exam  Gen: not in acute distress, Body mass index is 21.43 kg/m².   HEENT:supple, no accessory muscle use, no JVD appreciated  Cardiac: RRR, no murmurs  appreciated  Lungs: normal respiratory effort, mild/fine crackles at the left base/midlung fields otherwise clear sounds bilaterally  Abdomen: soft, nontender, normoactive bowel sounds  Extremities: No peripheral edema  Neuro: AAO X3, no focal motor deficit    Labs: I have personally reviewed pertinent lab results.        Shakir Woods MD

## 2024-10-01 NOTE — SPEECH THERAPY NOTE
Speech Language/Pathology  Consult received.  Records reviewed.  Pt admitted c symptoms concerning for CVA/TIA.  Pt passed RN Dysphagia Assessment.  Communication deficits denied and difficulties swallowing.   MRI results multiple foci of acute infarcts involving bilateral frontoparietal cortices and subcortical white matter (left greater than right). No mass effect or hemorrhagic transformation. Spoke with nursing reported no difficulties with chewing or swallowing. Pt is oriented x4.  No additional inpatient Speech Pathology evaluation appears indicated at this time.  Please re-consult if additional concerns arise. Thank you.

## 2024-10-01 NOTE — PHYSICAL THERAPY NOTE
PHYSICAL THERAPY EVALUATION  NAME:  Gold Van  DATE: 10/01/24    AGE:   78 y.o.  Mrn:   [id number]  ADMIT DX:  Shortness of breath [R06.02]  Pneumonia [J18.9]  COPD exacerbation (HCC) [J44.1]  Sepsis (HCC) [A41.9]  COVID-19 [U07.1]  Problem List:   Patient Active Problem List   Diagnosis    Type 2 diabetes mellitus with complication, without long-term current use of insulin (HCC)    Essential hypertension    Asthma    Hyperlipidemia    Sepsis due to pneumonia (HCC)    Hyponatremia    Pulmonary nodule    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    Non-recurrent bilateral inguinal hernia without obstruction or gangrene    Pruritus    History of pneumothorax    Acute respiratory failure with hypoxia (HCC)    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HCC)    Shortness of breath    COVID-19    Acute respiratory insufficiency    Dysmetria       Past Medical History  Past Medical History:   Diagnosis Date    Asthma     Diabetes mellitus (HCC)     Environmental allergies     Hyperlipidemia     Hypertension     Macular degeneration 07/13/2020    right eye    Orthostatic hypotension     Osteopenia     Pneumothorax     Sinusitis        Past Surgical History  Past Surgical History:   Procedure Laterality Date    COLONOSCOPY      KNEE CARTILAGE SURGERY Right 1964    VASECTOMY      WISDOM TOOTH EXTRACTION      x4       Length Of Stay: 2  Performed at least 2 patient identifiers during session: Name and Epic photo       10/01/24 0900   PT Last Visit   PT Visit Date 10/01/24   Note Type   Note type Evaluation   Pain Assessment   Pain Assessment Tool 0-10   Pain Score No Pain   Restrictions/Precautions   Weight Bearing Precautions Per Order No   Other Precautions Fall Risk;O2   Home Living   Type of Home House   Home Layout Two level;Bed/bath upstairs;Access;Stairs to enter with rails   Bathroom Shower/Tub Walk-in shower   Bathroom Toilet Raised   Bathroom Equipment Grab bars in shower   Bathroom Accessibility Accessible   Home  Equipment Walker;Cane  (walking stick)   Prior Function   Level of [place] Independent with ADLs;Independent with functional mobility;Independent with IADLS   Lives With Spouse   Receives Help From Family   IADLs Independent with driving;Independent with meal prep;Independent with medication management   Falls in the last 6 months 0   Vocational Retired  ()   Cognition   Overall Cognitive Status WFL   Arousal/Participation Alert   Orientation Level Oriented X4   Memory Within functional limits   Following Commands Follows all commands and directions without difficulty   Subjective   Subjective Pt agreeable to PT session   RLE Assessment   RLE Assessment   (generalized weakness)   LLE Assessment   LLE Assessment   (Generalized weakness)   Vision-Basic Assessment   Current Vision Wears glasses all the time   Coordination   Sensation   (decreased sensation in the right hand and fingers)   Finger to Nose & Finger to Finger  Impaired  (right hand impaired)   Heel to Shin Intact   Transfers   Sit to Stand 4  Minimal assistance   Additional items Assist x 1;Armrests;Increased time required   Stand to Sit 4  Minimal assistance   Additional items Assist x 1;Increased time required;Armrests   Ambulation/Elevation   Gait pattern R Foot drag;Improper Weight shift;Narrow KADIE;Forward Flexion;Decreased foot clearance   Gait Assistance 4  Minimal assist   Additional items Assist x 1   Assistive Device Rolling walker   Distance 25 FT X 2   Ambulation/Elevation Additional Comments Pt LOB with backwards ambualtion towards chair with mod a to regain balance, pt also continuosly needed cuing for walker management   Balance   Static Sitting Good   Dynamic Sitting Fair +   Static Standing Fair -   Dynamic Standing Fair -   Ambulatory Fair -   Activity Tolerance   Activity Tolerance Patient tolerated treatment well   Medical Staff Made Aware Doctor made aware of outcomes and findings regarding right sided weakness and  numbness   Nurse Made Aware Nurse made aware of outcome   Assessment   Prognosis Good   Problem List Decreased strength;Decreased endurance;Impaired balance;Decreased mobility   Assessment Pt is 78 y.o. male seen for PT evaluation s/p admit to [facility]/29/[facility]24 w/ Dysmetria. PT consulted to assess pt's functional mobility and d/c needs. Order placed for PT eval and tx, w/ up and OOB as tolerated order. Pt agreeable to PT  session upon arrival, pt found seated OOB in recliner.  PTA, pt was independent w/ all functional mobility w/ walking stick, lives w/ spouse in 2 level home, and retired.  Pt to benefit from continued PT tx to address deficits and maximize level of functional independent mobility and consistency. Upon conclusion pt  seated in recliner. Complexity: Comorbidities affecting pt's physical performance at time of assessment include: COPD, DM, htn, and respiratory failure. Personal factors affecting pt at time of IE include: advanced age, lives in multistory home, ambulating with assistive device, and steps to enter home. Please find objective findings from PT assessment regarding body systems outlined above with impairments and limitations including weakness, impaired balance, impaired coordination, and fall risk.  Pt's clinical presentation is currently unstable/unpredictable seen in pt's presentation of abnormal renal values, abnormal H&H, abnormal WBC count, abnormal sodium levels, and abnormal blood sugar levels. The patient's AM-PAC Basic Mobility Inpatient Short Form Raw Score is 18 .  Based on patient presentations and impairments, pt would most appropriately benefit from Level 2 resource intensity upon discharge. Please also refer to the recommendation of the Physical Therapist for safe discharge planning. RN verbalized pt appropriate for PT session.   Goals   Patient Goals To regain his ability to walk   LTG Expiration Date 10/11/24   Long Term Goal #1 Pt will: Perform bed mobility  tasks to modified I to return to multilevel home and improve ease of bed mobility. Perform transfers to modified I to decrease risk for falls and improve ease of transfers. Perform ambulation with walking stick for community distances to  decrease risk for falls and return to previous level of activity .   Plan   Treatment/Interventions Functional transfer training;LE strengthening/ROM;Elevations;Therapeutic exercise;Endurance training;Gait training   PT Frequency 3-5x/wk   Discharge Recommendation   Rehab Resource Intensity Level, PT II (Moderate Resource Intensity)   AM-PAC Basic Mobility Inpatient   Turning in Flat Bed Without Bedrails 3   Lying on Back to Sitting on Edge of Flat Bed Without Bedrails 3   Moving Bed to Chair 3   Standing Up From Chair Using Arms 3   Walk in Room 3   Climb 3-5 Stairs With Railing 3   Basic Mobility Inpatient Raw Score 18   Basic Mobility Standardized Score 41.05   [facility] Highest Level Of Mobility   -HLM Goal 6: Walk 10 steps or more   JH-HLM Achieved 7: Walk 25 feet or more     Pt seen as a co-eval with OT due to the patient's co-morbidities, clinically unstable presentation, and present impairments which are a regression from the patient's baseline.       Time In: 0855  Time Out: 0915  Total Evaluation Minutes: 20    Domenico Robb, PT

## 2024-10-01 NOTE — ASSESSMENT & PLAN NOTE
Suspected COPD exacerbation, lung sounds decreased on exam. Improved  Arrived to ER on CPAP, transferred over to [facility] for 2 hours .  Received IV steroids while in the ER   Evaluated by critical care who felt that patient is stable for medical admission  Continue Solu-Medrol 40 mg IV to BID   Oxygen and respiratory protocol  Continue nebulized medication  Continue Mucinex, continue Singulair  Conduct serial physical assessments   no

## 2024-10-02 ENCOUNTER — APPOINTMENT (INPATIENT)
Dept: CT IMAGING | Facility: HOSPITAL | Age: 79
DRG: 871 | End: 2024-10-02
Payer: COMMERCIAL

## 2024-10-02 PROBLEM — I63.9 STROKE (CEREBRUM) (HCC): Status: ACTIVE | Noted: 2024-10-02

## 2024-10-02 LAB
ANION GAP SERPL CALCULATED.3IONS-SCNC: 7 MMOL/L (ref 4–13)
BACTERIA BLD CULT: ABNORMAL
BUN SERPL-MCNC: 25 MG/DL (ref 5–25)
CALCIUM SERPL-MCNC: 8 MG/DL (ref 8.4–10.2)
CHLORIDE SERPL-SCNC: 102 MMOL/L (ref 96–108)
CHOLEST SERPL-MCNC: 138 MG/DL
CO2 SERPL-SCNC: 23 MMOL/L (ref 21–32)
CREAT SERPL-MCNC: 0.84 MG/DL (ref 0.6–1.3)
ERYTHROCYTE [DISTWIDTH] IN BLOOD BY AUTOMATED COUNT: 13.4 % (ref 11.6–15.1)
EST. AVERAGE GLUCOSE BLD GHB EST-MCNC: 258 MG/DL
GFR SERPL CREATININE-BSD FRML MDRD: 83 ML/MIN/1.73SQ M
GLUCOSE SERPL-MCNC: 188 MG/DL (ref 65–140)
GLUCOSE SERPL-MCNC: 244 MG/DL (ref 65–140)
GLUCOSE SERPL-MCNC: 259 MG/DL (ref 65–140)
GLUCOSE SERPL-MCNC: 296 MG/DL (ref 65–140)
GLUCOSE SERPL-MCNC: 370 MG/DL (ref 65–140)
GRAM STN SPEC: ABNORMAL
HBA1C MFR BLD: 10.6 %
HCT VFR BLD AUTO: 33.9 % (ref 36.5–49.3)
HDLC SERPL-MCNC: 37 MG/DL
HGB BLD-MCNC: 11.1 G/DL (ref 12–17)
LDLC SERPL CALC-MCNC: 73 MG/DL (ref 0–100)
MCH RBC QN AUTO: 29.8 PG (ref 26.8–34.3)
MCHC RBC AUTO-ENTMCNC: 32.7 G/DL (ref 31.4–37.4)
MCV RBC AUTO: 91 FL (ref 82–98)
PLATELET # BLD AUTO: 222 THOUSANDS/UL (ref 149–390)
PMV BLD AUTO: 9.9 FL (ref 8.9–12.7)
POTASSIUM SERPL-SCNC: 4.3 MMOL/L (ref 3.5–5.3)
PROCALCITONIN SERPL-MCNC: 0.21 NG/ML
RBC # BLD AUTO: 3.73 MILLION/UL (ref 3.88–5.62)
S AUREUS+CONS DNA BLD POS NAA+NON-PROBE: DETECTED
SODIUM SERPL-SCNC: 132 MMOL/L (ref 135–147)
TRIGL SERPL-MCNC: 138 MG/DL
WBC # BLD AUTO: 8.14 THOUSAND/UL (ref 4.31–10.16)

## 2024-10-02 PROCEDURE — 99232 SBSQ HOSP IP/OBS MODERATE 35: CPT | Performed by: INTERNAL MEDICINE

## 2024-10-02 PROCEDURE — 84145 PROCALCITONIN (PCT): CPT | Performed by: INTERNAL MEDICINE

## 2024-10-02 PROCEDURE — 97116 GAIT TRAINING THERAPY: CPT

## 2024-10-02 PROCEDURE — 99233 SBSQ HOSP IP/OBS HIGH 50: CPT

## 2024-10-02 PROCEDURE — 80048 BASIC METABOLIC PNL TOTAL CA: CPT | Performed by: NURSE PRACTITIONER

## 2024-10-02 PROCEDURE — 71260 CT THORAX DX C+: CPT

## 2024-10-02 PROCEDURE — 94760 N-INVAS EAR/PLS OXIMETRY 1: CPT

## 2024-10-02 PROCEDURE — 94664 DEMO&/EVAL PT USE INHALER: CPT

## 2024-10-02 PROCEDURE — 97110 THERAPEUTIC EXERCISES: CPT

## 2024-10-02 PROCEDURE — 83036 HEMOGLOBIN GLYCOSYLATED A1C: CPT | Performed by: NURSE PRACTITIONER

## 2024-10-02 PROCEDURE — 74177 CT ABD & PELVIS W/CONTRAST: CPT

## 2024-10-02 PROCEDURE — 85027 COMPLETE CBC AUTOMATED: CPT | Performed by: NURSE PRACTITIONER

## 2024-10-02 PROCEDURE — 94640 AIRWAY INHALATION TREATMENT: CPT

## 2024-10-02 PROCEDURE — 80061 LIPID PANEL: CPT | Performed by: NURSE PRACTITIONER

## 2024-10-02 PROCEDURE — 82948 REAGENT STRIP/BLOOD GLUCOSE: CPT

## 2024-10-02 RX ORDER — CEFEPIME HYDROCHLORIDE 2 G/50ML
2000 INJECTION, SOLUTION INTRAVENOUS EVERY 12 HOURS
Status: DISCONTINUED | OUTPATIENT
Start: 2024-10-02 | End: 2024-10-04

## 2024-10-02 RX ORDER — INSULIN LISPRO 100 [IU]/ML
2 INJECTION, SOLUTION INTRAVENOUS; SUBCUTANEOUS
Status: DISCONTINUED | OUTPATIENT
Start: 2024-10-02 | End: 2024-10-03

## 2024-10-02 RX ORDER — INSULIN GLARGINE 100 [IU]/ML
15 INJECTION, SOLUTION SUBCUTANEOUS
Status: DISCONTINUED | OUTPATIENT
Start: 2024-10-02 | End: 2024-10-04 | Stop reason: HOSPADM

## 2024-10-02 RX ADMIN — ASPIRIN 81 MG 81 MG: 81 TABLET ORAL at 09:17

## 2024-10-02 RX ADMIN — ENOXAPARIN SODIUM 40 MG: 40 INJECTION SUBCUTANEOUS at 09:15

## 2024-10-02 RX ADMIN — LEVALBUTEROL HYDROCHLORIDE 1.25 MG: 1.25 SOLUTION RESPIRATORY (INHALATION) at 13:07

## 2024-10-02 RX ADMIN — INSULIN LISPRO 2 UNITS: 100 INJECTION, SOLUTION INTRAVENOUS; SUBCUTANEOUS at 17:25

## 2024-10-02 RX ADMIN — INSULIN GLARGINE 15 UNITS: 100 INJECTION, SOLUTION SUBCUTANEOUS at 21:54

## 2024-10-02 RX ADMIN — SODIUM CHLORIDE SOLN NEBU 3% 4 ML: 3 NEBU SOLN at 07:11

## 2024-10-02 RX ADMIN — ATORVASTATIN CALCIUM 40 MG: 40 TABLET, FILM COATED ORAL at 17:25

## 2024-10-02 RX ADMIN — LISINOPRIL 20 MG: 20 TABLET ORAL at 21:54

## 2024-10-02 RX ADMIN — LEVALBUTEROL HYDROCHLORIDE 1.25 MG: 1.25 SOLUTION RESPIRATORY (INHALATION) at 07:11

## 2024-10-02 RX ADMIN — INSULIN LISPRO 10 UNITS: 100 INJECTION, SOLUTION INTRAVENOUS; SUBCUTANEOUS at 07:51

## 2024-10-02 RX ADMIN — INSULIN LISPRO 6 UNITS: 100 INJECTION, SOLUTION INTRAVENOUS; SUBCUTANEOUS at 21:55

## 2024-10-02 RX ADMIN — FAMOTIDINE 20 MG: 20 TABLET, FILM COATED ORAL at 17:25

## 2024-10-02 RX ADMIN — PREDNISONE 40 MG: 20 TABLET ORAL at 09:17

## 2024-10-02 RX ADMIN — CEFTRIAXONE 1000 MG: 1 INJECTION, SOLUTION INTRAVENOUS at 12:08

## 2024-10-02 RX ADMIN — INSULIN LISPRO 2 UNITS: 100 INJECTION, SOLUTION INTRAVENOUS; SUBCUTANEOUS at 17:26

## 2024-10-02 RX ADMIN — FAMOTIDINE 20 MG: 20 TABLET, FILM COATED ORAL at 09:15

## 2024-10-02 RX ADMIN — GUAIFENESIN 600 MG: 600 TABLET ORAL at 17:25

## 2024-10-02 RX ADMIN — IOHEXOL 100 ML: 350 INJECTION, SOLUTION INTRAVENOUS at 11:03

## 2024-10-02 RX ADMIN — INSULIN LISPRO 2 UNITS: 100 INJECTION, SOLUTION INTRAVENOUS; SUBCUTANEOUS at 13:30

## 2024-10-02 RX ADMIN — ATENOLOL 25 MG: 25 TABLET ORAL at 09:20

## 2024-10-02 RX ADMIN — MONTELUKAST 10 MG: 10 TABLET, FILM COATED ORAL at 21:54

## 2024-10-02 RX ADMIN — LISINOPRIL 20 MG: 20 TABLET ORAL at 09:16

## 2024-10-02 RX ADMIN — GUAIFENESIN 600 MG: 600 TABLET ORAL at 09:17

## 2024-10-02 RX ADMIN — LEVALBUTEROL HYDROCHLORIDE 1.25 MG: 1.25 SOLUTION RESPIRATORY (INHALATION) at 19:53

## 2024-10-02 RX ADMIN — AMLODIPINE BESYLATE 5 MG: 5 TABLET ORAL at 21:54

## 2024-10-02 RX ADMIN — SODIUM CHLORIDE 100 ML/HR: 0.9 INJECTION, SOLUTION INTRAVENOUS at 02:43

## 2024-10-02 RX ADMIN — AZITHROMYCIN DIHYDRATE 500 MG: 250 TABLET ORAL at 12:08

## 2024-10-02 RX ADMIN — INSULIN LISPRO 6 UNITS: 100 INJECTION, SOLUTION INTRAVENOUS; SUBCUTANEOUS at 12:08

## 2024-10-02 RX ADMIN — SODIUM CHLORIDE SOLN NEBU 3% 4 ML: 3 NEBU SOLN at 13:07

## 2024-10-02 RX ADMIN — SODIUM CHLORIDE SOLN NEBU 3% 4 ML: 3 NEBU SOLN at 19:53

## 2024-10-02 RX ADMIN — CEFEPIME HYDROCHLORIDE 2000 MG: 2 INJECTION, SOLUTION INTRAVENOUS at 17:25

## 2024-10-02 RX ADMIN — CLOPIDOGREL 75 MG: 75 TABLET ORAL at 09:17

## 2024-10-02 RX ADMIN — AMLODIPINE BESYLATE 5 MG: 5 TABLET ORAL at 09:16

## 2024-10-02 NOTE — ARC ADMISSION
Patients case reviewed, patient is pre-approved for ARC pending medical stability, LOF at discharge, bed availability and insurance authorization approval.  ARC admissions will continue to follow patient for progression of care and discharge readiness.

## 2024-10-02 NOTE — ASSESSMENT & PLAN NOTE
POA  Arrived in suspected COPD exacerbation/pneumonia on CPAP, de-escalated to BiPAP, further de-escalated to nasal cannula oxygen  Currently on 1 L nasal cannula oxygen  See plan for COPD with asthma

## 2024-10-02 NOTE — ASSESSMENT & PLAN NOTE
Lab Results   Component Value Date    HGBA1C 10.6 (H) 10/02/2024       Recent Labs     10/01/24  1557 10/01/24  2119 10/02/24  0706 10/02/24  1114   POCGLU 280* 305* 370* 259*       Blood Sugar Average: Last 72 hrs:  (P) 324.75    Target blood sugar inpatient 140-180   Blood sugars not optimized currently  In setting of steroids  Continue diabetic diet  Continue SSI dosing was increased to algorithm 4 due to hyperglycemia  Increase Lantus from 10 units at bedtime to 15 units  Add 2 units Humalog with meals  Hold home glipizide and metformin, resume on discharge  Monitor glucose and adjust medication as needed  Hypoglycemia protocol  BMP a.m.

## 2024-10-02 NOTE — PLAN OF CARE
Problem: PAIN - ADULT  Goal: Verbalizes/displays adequate comfort level or baseline comfort level  Description: Interventions:  - Encourage patient to monitor pain and request assistance  - Assess pain using appropriate pain scale  - Administer analgesics based on type and severity of pain and evaluate response  - Implement non-pharmacological measures as appropriate and evaluate response  - Consider cultural and social influences on pain and pain management  - Notify physician/advanced practitioner if interventions unsuccessful or patient reports new pain  Outcome: Progressing     Problem: INFECTION - ADULT  Goal: Absence or prevention of progression during hospitalization  Description: INTERVENTIONS:  - Assess and monitor for signs and symptoms of infection  - Monitor lab/diagnostic results  - Monitor all insertion sites, i.e. indwelling lines, tubes, and drains  - Monitor endotracheal if appropriate and nasal secretions for changes in amount and color  - [place] appropriate cooling/warming therapies per order  - Administer medications as ordered  - Instruct and encourage patient and family to use good hand hygiene technique  - Identify and instruct in appropriate isolation precautions for identified infection/condition  Outcome: Progressing  Goal: Absence of fever/infection during neutropenic period  Description: INTERVENTIONS:  - Monitor WBC    Outcome: Progressing

## 2024-10-02 NOTE — PHYSICAL THERAPY NOTE
"   PT Treatment Note    NAME:  Gold Van  DATE: 10/02/24    AGE:   78 y.o.  Mrn:   [id number]  ADMIT DX:  Shortness of breath [R06.02]  Pneumonia [J18.9]  COPD exacerbation (HCC) [J44.1]  Sepsis (HCC) [A41.9]  COVID-19 [U07.1]  Performed at least 2 patient identifiers during session: Name and Epic photo       10/02/24 1410   PT Last Visit   PT Visit Date 10/02/24   Note Type   Note Type Treatment   Pain Assessment   Pain Assessment Tool 0-10   Pain Score No Pain   Restrictions/Precautions   Weight Bearing Precautions Per Order No   General   Chart Reviewed Yes   Cognition   Overall Cognitive Status WFL   Arousal/Participation Alert;Responsive   Attention Within functional limits   Orientation Level Oriented X4   Memory Within functional limits   Following Commands Follows all commands and directions without difficulty   Subjective   Subjective \"I want to get up and move\"   Transfers   Sit to Stand 4  Minimal assistance   Additional items Assist x 1;Armrests;Increased time required;Verbal cues  (Verbal cues for hand placement)   Stand to Sit 4  Minimal assistance   Additional items Assist x 1;Increased time required;Armrests;Verbal cues   Ambulation/Elevation   Gait pattern Improper Weight shift;Decreased foot clearance;Forward Flexion;Short stride   Gait Assistance 4  Minimal assist   Additional items Assist x 1  (cues for walker management, needed repetitive cues to keep feet within walker)   Assistive Device Rolling walker   Distance 50 ft x2   Balance   Static Sitting Good   Dynamic Sitting Fair +   Static Standing Fair -   Dynamic Standing Fair -   Ambulatory Fair -   Activity Tolerance   Activity Tolerance Patient tolerated treatment well   Nurse Made Aware nurse made aware of outcome   Exercises   UE Exercise 20 reps;Sitting  (up right rows)   Marching Standing;20 reps;Bilateral   Neuro re-ed Ball toss, 2 sets of 20   Balance training  SLS 2 x 20   Assessment   Prognosis Good   Problem List Decreased " strength;Decreased endurance;Impaired balance   Goals   Patient Goals To walk   LTG Expiration Date 10/11/24   PT Treatment Day 1   Plan   Treatment/Interventions Functional transfer training;LE strengthening/ROM;Elevations;Therapeutic exercise;Endurance training;Gait training   Progress Progressing toward goals   PT Frequency 3-5x/wk   Discharge Recommendation   Rehab Resource Intensity Level, PT II (Moderate Resource Intensity)   AM-PAC Basic Mobility Inpatient   Turning in Flat Bed Without Bedrails 3   Lying on Back to Sitting on Edge of Flat Bed Without Bedrails 3   Moving Bed to Chair 3   Standing Up From Chair Using Arms 3   Walk in Room 3   Climb 3-5 Stairs With Railing 3   Basic Mobility Inpatient Raw Score 18   Basic Mobility Standardized Score 41.05   [facility] Highest Level Of Mobility   -HLM Goal 6: Walk 10 steps or more   -HLM Achieved 7: Walk 25 feet or more           Time In: 1411  Time Out: 1454  Total Treatment Minutes: 43    Domenico Robb, PT

## 2024-10-02 NOTE — ARC ADMISSION
ARC  contacted the patient and spouse introduced self, explained role, reviewed ARC program, services offered, acute rehab criteria, approval process, insurance authorization process, ARC locations and preferences. Patient / family preferred ARC location is [place]. Patient / spouse made aware ARC Reviewer will keep their Care Manager updated regarding referral status. ARC information, insurance benefits and virtual tour information provided to the patient and spouse.     Thank you,   Corrine Schilling  [facility] Admissions

## 2024-10-02 NOTE — QUICK NOTE
Consult for cardiac testing ([facility])    Chart reviewed.  In brief, pt hospitalized with Covid infection 9/24-9/26/2024.  He was readmitted 9/29/2024 with acute on chronic resp failure, with concern for pneumonia.  He was evaluated and is being treated by pulmonary and [facility].  His respiratory status is improving.      On 10/1, he was noted to be less coordinated on the right side compared to the left.  He was evaluated by neurology and ultimately found to have multiple acute infarcts.      Cardiology is consulted for ambulatory monitoring due to concern for afib given this finding.      Telemetry and EKG's reviewed-no evidence of afib.    Will arrange for 2 week ambulatory monitor on discharge.

## 2024-10-02 NOTE — PROGRESS NOTES
Progress Note - Hospitalist   Name: Gold Van 78 y.o. male I MRN: [id number]  Unit/Bed#: -01 I Date of Admission: 9/29/2024   Date of Service: 10/2/2024 I Hospital Day: 3    Assessment & Plan  Acute respiratory failure with hypoxia (HCC)  POA  Arrived in suspected COPD exacerbation/pneumonia on CPAP, de-escalated to BiPAP, further de-escalated to nasal cannula oxygen  Currently on 1 L nasal cannula oxygen  See plan for COPD with asthma    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HCC)  Suspected COPD exacerbation  Arrived to ER on CPAP, transferred over to BiPAP for 2 hours .  Received IV steroids while in the ER   Evaluated by critical care who felt that patient is stable for medical admission  Appreciate input of pulmonology who are following  Was receiving IV Solu-Medrol transitioned to oral prednisone starting today  Continue Zithromax and ceftriaxone  Continue nebulizer treatments  Encourage out of bed to chair  Incentive spirometry  Respiratory protocol-patient has been weaned to 1 L nasal cannula oxygen as of today  Sepsis due to pneumonia (HCC)  POA  Met sepsis criteria with tachycardia and leukocytosis (noted to be on chronic steroids)  Lactic acid 2.6-->1.3 without intervention   Blood cultures x 2-1/2 growing staph coagulase negative bacteria felt likely a skin contaminant  Chest x-ray noted mild opacity in left base atelectasis versus pneumonia  Started on ceftriaxone and azithromycin in the ER, continue, D4  Appreciate input of pulmonology who are following  IV Solu-Medrol was weaned to prednisone starting today  Continue nebulizer treatments  Out of bed to chair, I-S  CBC a.m.  Type 2 diabetes mellitus with complication, without long-term current use of insulin ([facility])  Lab Results   Component Value Date    HGBA1C 10.6 (H) 10/02/2024       Recent Labs     10/01/24  1557 10/01/24  2119 10/02/24  0706 10/02/24  1114   POCGLU 280* 305* 370* 259*       Blood Sugar Average: Last 72 hrs:  (P) 324.75    Target blood sugar  inpatient 140-180   Blood sugars not optimized currently  In setting of steroids  Continue diabetic diet  Continue SSI dosing was increased to algorithm 4 due to hyperglycemia  Increase Lantus from 10 units at bedtime to 15 units  Add 2 units Humalog with meals  Hold home glipizide and metformin, resume on discharge  Monitor glucose and adjust medication as needed  Hypoglycemia protocol  BMP a.m.  Essential hypertension  Blood pressure controlled   Continue amlodipine 5 mg p.o. twice daily and atenolol 25 mg p.o. daily  Monitor blood pressure per protocol  COVID-19  Day 11 post positive test, hospitalized 9/24/2024, received remdesivir and baricitinib  No need for isolation  Pruritus  Takes prednisone chronically 10 mg alternating with 15 mg.    Home regimen currently on hold, is receiving steroid for COPD exacerbation  Dysmetria  Patient evaluated by therapy  who noted problems with coordination with the right leg.  Unclear onset as patient's wife relayed that patient had deconditioning/altered gait at home where he was leaning on furniture to walk after his hospital discharge  Discussed with neurology, recommended stroke workup   See plan for CVA   CVA (cerebrovascular accident) (HCC)  CTA head and neck results reviewed  MRI brain: Multiple foci of acute infarcts involving bilateral frontoparietal cortices and subcortical white matter (left greater than right). No mass effect or hemorrhagic transformation. Old right centrum semiovale lacunar infarct. Mild chronic microangiopathic change. Acute or subacute sinus disease as described.  TTE unremarkable  Appreciate input of neurology-recommend dual antiplatelet therapy x 90 days with Plavix and aspirin, then aspirin monotherapy.  Continue statin.  Avoid hypotension.  Recommended pan CT to rule out underlying malignancy.  If this is negative recommendation is for Zio patch or loop recorder and outpatient follow-up by neurovascular team in 6 weeks  CT chest abdomen  pelvis results pending  Monitored on telemetry  Appreciate input of cardiology-planning follow-up in outpatient setting for event monitor.  No A-fib noted on telemetry  PT/OT input appreciated  Case management following-discussed home with home care versus short-term rehab with wife who was undecided    VTE Pharmacologic Prophylaxis: VTE Score: 6 High Risk (Score >/= 5) - Pharmacological DVT Prophylaxis Ordered: enoxaparin (Lovenox). Sequential Compression Devices Ordered.    Mobility:   Basic Mobility Inpatient Raw Score: 18  JH-HLM Goal: 6: Walk 10 steps or more  JH-HLM Achieved: 6: Walk 10 steps or more  JH-HLM Goal achieved. Continue to encourage appropriate mobility.    Patient Centered Rounds: I performed bedside rounds with nursing staff today.   Discussions with Specialists or Other Care Team Provider: Cardiology, pulmonology, PT/OT, nursing, case management    Education and Discussions with Family / Patient: Updated  (wife) at bedside.    Current Length of Stay: 3 day(s)  Current Patient Status: Inpatient   Certification Statement: The patient will continue to require additional inpatient hospital stay due to continued treatment for COPD exacerbation with IV antibiotics and steroids, pulmonology following, also will require rehab placement on discharge case management following making referrals  Discharge Plan: Anticipate discharge in 24-48 hrs to rehab facility.    Code Status: Level 1 - Full Code    Subjective   Denies any dizziness, lightheadedness, chest pain or palpitations.  No shortness of breath.  Verbalizing needs.    Objective :  Temp:  [97.5 °F (36.4 °C)-98.1 °F (36.7 °C)] 97.6 °F (36.4 °C)  HR:  [70-84] 70  BP: (115-135)/(59-73) 133/73  Resp:  [16-19] 18  SpO2:  [94 %-100 %] 94 %  O2 Device: None (Room air)  Nasal Cannula O2 Flow Rate (L/min):  [1 L/min-2 L/min] 1 L/min    Body mass index is 21.43 kg/m².     Input and Output Summary (last 24 hours):     Intake/Output Summary (Last  24 hours) at 10/2/2024 1440  Last data filed at 10/2/2024 1300  Gross per 24 hour   Intake 2205 ml   Output 1675 ml   Net 530 ml       Physical Exam  Vitals and nursing note reviewed.   Constitutional:       General: He is not in acute distress.     Appearance: He is well-developed.   HENT:      Head: Normocephalic and atraumatic.   Eyes:      Extraocular Movements: Extraocular movements intact.      Conjunctiva/sclera: Conjunctivae normal.      Pupils: Pupils are equal, round, and reactive to light.   Cardiovascular:      Rate and Rhythm: Normal rate and regular rhythm.      Pulses: Normal pulses.      Heart sounds: Normal heart sounds. No murmur heard.  Pulmonary:      Effort: Pulmonary effort is normal. No respiratory distress.      Breath sounds: No wheezing or rales.      Comments: Decreased breath sounds bilateral bases, no cough or shortness of breath  Abdominal:      General: There is no distension.      Palpations: Abdomen is soft.      Tenderness: There is no abdominal tenderness. There is no guarding.   Musculoskeletal:         General: No swelling.   Skin:     General: Skin is warm and dry.      Capillary Refill: Capillary refill takes less than 2 seconds.   Neurological:      General: No focal deficit present.      Mental Status: He is alert and oriented to person, place, and time. Mental status is at baseline.      Cranial Nerves: No cranial nerve deficit.      Sensory: No sensory deficit.      Motor: No weakness.      Comments: Gait not assessed at this time   Psychiatric:         Mood and Affect: Mood normal.         Behavior: Behavior normal.         Thought Content: Thought content normal.         Judgment: Judgment normal.           Lines/Drains:        Telemetry:  Telemetry Orders (From admission, onward)               24 Hour Telemetry Monitoring  Continuous x 24 Hours (Telem)        Question:  Reason for 24 Hour Telemetry  Answer:  TIA/Suspected CVA/ Confirmed CVA                     Telemetry  Reviewed: Normal Sinus Rhythm  Indication for Continued Telemetry Use: Acute CVA               Lab Results: I have reviewed the following results:   Results from last 7 days   Lab Units 10/02/24  0424 10/01/24  0421 09/30/24  0448 09/29/24  1022   WBC Thousand/uL 8.14   < > 11.07* 15.13*   HEMOGLOBIN g/dL 11.1*   < > 11.5* 13.5   HEMATOCRIT % 33.9*   < > 34.5* 39.8   PLATELETS Thousands/uL 222   < > 223 275   SEGS PCT %  --   --   --  86*   LYMPHO PCT %  --   --  5* 9*   MONO PCT %  --   --  2* 4   EOS PCT %  --   --  0 0    < > = values in this interval not displayed.     Results from last 7 days   Lab Units 10/02/24  0424 09/30/24  0448 09/29/24  1022   SODIUM mmol/L 132*   < > 134*   POTASSIUM mmol/L 4.3   < > 4.3   CHLORIDE mmol/L 102   < > 98   CO2 mmol/L 23   < > 25   BUN mg/dL 25   < > 23   CREATININE mg/dL 0.84   < > 1.07   ANION GAP mmol/L 7   < > 11   CALCIUM mg/dL 8.0*   < > 9.3   ALBUMIN g/dL  --   --  3.4*   TOTAL BILIRUBIN mg/dL  --   --  0.87   ALK PHOS U/L  --   --  59   ALT U/L  --   --  21   AST U/L  --   --  13   GLUCOSE RANDOM mg/dL 244*   < > 279*    < > = values in this interval not displayed.     Results from last 7 days   Lab Units 09/29/24  1022   INR  1.01     Results from last 7 days   Lab Units 10/02/24  1114 10/02/24  0706 10/01/24  2119 10/01/24  1557 10/01/24  1128 10/01/24  0729 09/30/24  2134 09/30/24  1725 09/30/24  1613 09/30/24  1107 09/30/24  0753 09/29/24  1631   POC GLUCOSE mg/dl 259* 370* 305* 280* 305* 251* 282* 401* 442* 387* 217* 398*     Results from last 7 days   Lab Units 10/02/24  0424   HEMOGLOBIN A1C % 10.6*     Results from last 7 days   Lab Units 10/02/24  0424 10/01/24  0421 09/29/24  1406 09/29/24  1031 09/29/24  1022 09/26/24  0443   LACTIC ACID mmol/L  --   --  1.3 2.6*  --   --    PROCALCITONIN ng/ml 0.21 0.41*  --   --  0.19 1.15*       Recent Cultures (last 7 days):   Results from last 7 days   Lab Units 10/01/24  0834 09/29/24  1022   BLOOD CULTURE   --   Staphylococcus coagulase negative*  No Growth at 72 hrs.   SPUTUM CULTURE  3+ Growth of Pseudomonas aeruginosa*  --    GRAM STAIN RESULT  1+ Polys*  1+ Gram negative rods*  1+ Gram positive cocci in pairs*  1+ Epithelial Cells* Gram positive cocci in clusters*       Imaging Results Review: I reviewed radiology reports from this admission including: CTA head and neck: MRI brain.  Other Study Results Review: EKG was reviewed.  Other studies reviewed include: Echo    Last 24 Hours Medication List:     Current Facility-Administered Medications:     acetaminophen (TYLENOL) tablet 650 mg, Q6H PRN    albuterol (PROVENTIL HFA,VENTOLIN HFA) inhaler 2 puff, Q4H PRN    albuterol inhalation solution 2.5 mg, Q4H PRN    amLODIPine (NORVASC) tablet 5 mg, BID    aspirin chewable tablet 81 mg, Daily    atenolol (TENORMIN) tablet 25 mg, Daily    atorvastatin (LIPITOR) tablet 40 mg, QPM    azithromycin (ZITHROMAX) tablet 500 mg, Q24H    benzonatate (TESSALON PERLES) capsule 100 mg, TID PRN    cefTRIAXone (ROCEPHIN) IVPB (premix in dextrose) 1,000 mg 50 mL, Q24H, Last Rate: 1,000 mg (10/02/24 1208)    clopidogrel (PLAVIX) tablet 75 mg, Daily    enoxaparin (LOVENOX) subcutaneous injection 40 mg, Daily    famotidine (PEPCID) tablet 20 mg, BID    guaiFENesin (MUCINEX) 12 hr tablet 600 mg, BID    insulin glargine (LANTUS) subcutaneous injection 15 Units 0.15 mL, HS    insulin lispro (HumALOG/ADMELOG) 100 units/mL subcutaneous injection 2 Units, TID With Meals    insulin lispro (HumALOG/ADMELOG) 100 units/mL subcutaneous injection 2-12 Units, TID AC **AND** Fingerstick Glucose (POCT), TID AC    insulin lispro (HumALOG/ADMELOG) 100 units/mL subcutaneous injection 2-12 Units, HS    levalbuterol (XOPENEX) inhalation solution 1.25 mg, TID    lisinopril (ZESTRIL) tablet 20 mg, BID    LORazepam (ATIVAN) tablet 0.5 mg, HS PRN    montelukast (SINGULAIR) tablet 10 mg, HS    predniSONE tablet 40 mg, Daily    sodium chloride 3 % inhalation  solution 4 mL, TID    Administrative Statements   Today, Patient Was Seen By: BELGICA Nelson  I have spent a total time of 38 minutes in caring for this patient on the day of the visit/encounter including Documenting in the medical record, Reviewing / ordering tests, medicine, procedures  , Obtaining or reviewing history  , and Communicating with other healthcare professionals .    **Please Note: This note may have been constructed using a voice recognition system.**

## 2024-10-02 NOTE — ASSESSMENT & PLAN NOTE
CTA head and neck results reviewed  MRI brain: Multiple foci of acute infarcts involving bilateral frontoparietal cortices and subcortical white matter (left greater than right). No mass effect or hemorrhagic transformation. Old right centrum semiovale lacunar infarct. Mild chronic microangiopathic change. Acute or subacute sinus disease as described.  TTE unremarkable  Appreciate input of neurology-recommend dual antiplatelet therapy x 90 days with Plavix and aspirin, then aspirin monotherapy.  Continue statin.  Avoid hypotension.  Recommended pan CT to rule out underlying malignancy.  If this is negative recommendation is for Zio patch or loop recorder and outpatient follow-up by neurovascular team in 6 weeks  CT chest abdomen pelvis results pending  Monitored on telemetry  Appreciate input of cardiology-planning follow-up in outpatient setting for event monitor.  No A-fib noted on telemetry  PT/OT input appreciated  Case management following-discussed home with home care versus short-term rehab with wife who was undecided

## 2024-10-02 NOTE — NUTRITION
"   10/02/24 1439   Biochemical Data,Medical Tests, and Procedures   Biochemical Data/Medical Tests/Procedures Lab values reviewed;Meds reviewed   Labs (Comment) 9/2/2024 A1c 10.6, glucose 258, hemoglobin 11.1, hematocrit 33.9, HDL 37, sodium 132, calcium 8.0   Meds (Comment) Atorvastatin, Plavix, Pepcid, insulin, lisinopril, prednisone   Nutrition-Focused Physical Exam   Nutrition-Focused Physical Exam Findings RN skin assessment reviewed;Edema;Wound  (+1 bilateral lower extremity edema.  Stage I pressure injury at coccyx.)   Medical-Related Concerns type 2 diabetes, hypertension, COPD, hyperlipidemia   Adequacy of Intake   Nutrition Modality PO   Feeding Route   PO Independent   Current PO Intake   Current Diet Order CCD 2 diet, thin liquids   Current Meal Intake 50-75%   Estimated calorie intake compared to estimated need Anticipate nutrient needs are not always met   PES Statement   Problem Intake   Oral or Nutritional Support Intake (2) Inadequate oral intake NI-2.1   Related to Decreased appetite   As evidenced by: Per patient/family interview;Reported intake < usual   Additional PES needed? Yes   PES Statement 2   Problem Behavioral-Environmental   As evidenced by Per patient/family interview   Related to Other (Comment)  (Cardiac diet, stroke prevention)   Knowledge and Beliefs (1) Food- and nutrition-related knowledge deficit NB-1.1   Recommendations/Interventions   Malnutrition/BMI Present No   Summary Consult-stroke.  Presents with SOB.  COVID-positive. Past medical history significant for type 2 diabetes, hypertension, COPD, hyperlipidemia.  Weight history reviewed.  Noted significant 10# loss in 1 week (5.9% body weight).  +1 bilateral lower extremity edema.  Stage I pressure injury at coccyx.  Prescribed a CCD 2 diet, thin liquids.  Meal completion 50-75%. Patient reports his appetite has not been real good, \"I have been eating about half of what they bring me.\" He usually has 2 meals daily. Follows no " "special diet. NKFA. Uses salt. Denies dysphagia.  Patient and his wife cook and grocery shop. He reports some weight loss with recent hospital admissions. He will drink a protein nutrition supplement TwoCal HN to aid in weight gain. States his weight has been in the 170s in past couple months. Checks blood sugar once in the morning.  RD discussed diet as prescribed.  Discussed cardiac diet in relation to stroke prevention.  Provided and discussed \"stroke nutrition therapy\" handout.  Patient and his wife verbalized understanding.  Discussed nutrition supplements in setting of weight loss prevention.  Agreeable to Glucerna vanilla once daily.  Will monitor tolerance at follow-up.   Interventions/Recommendations Adjust diet order;Supplement initiate;Monitor I & O's   Education Assessment   Education Education initiated/ completed   Education Notes Discussed cardiac diet in relation to stroke prevention. Provided and discussed \"stroke nutrition therapy\" handout. Patient and his wife verbalized understanding.   Patient Nutrition Goals   Goal Comprehend education;Increase kcal/PRO intake;Meet PO needs;Wt maintained   Goal Status Initiated   Timeframe to complete goal by next f/u   Nutrition Complexity Risk   Nutrition complexity level Low risk   Follow up date 10/09/24       "

## 2024-10-02 NOTE — NURSING NOTE
Patient assisted to bedside chair, assist x1. Tolerated breakfast and morning meds well. Offers no other complaints at this time. Call bell and belongings within reach, chair alarm on.

## 2024-10-02 NOTE — ASSESSMENT & PLAN NOTE
Patient evaluated by therapy  who noted problems with coordination with the right leg.  Unclear onset as patient's wife relayed that patient had deconditioning/altered gait at home where he was leaning on furniture to walk after his hospital discharge  Discussed with neurology, recommended stroke workup   See plan for CVA

## 2024-10-02 NOTE — CASE MANAGEMENT
Case Management Discharge Planning Note    Patient name Gold Van  Location /-01 MRN [id number]  : 1945 Date 10/2/2024       Current Admission Date: 2024  Current Admission Diagnosis: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[facility])   Patient Active Problem List    Diagnosis Date Noted Date Diagnosed    CVA (cerebrovascular accident) ([facility]) 10/02/2024     Dysmetria 10/01/2024     COVID-19 2024     Acute respiratory insufficiency 2024     Shortness of breath 2024     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[facility]) 2024     History of pneumothorax 2024     Acute respiratory failure with hypoxia ([facility]) 2024     Non-recurrent bilateral inguinal hernia without obstruction or gangrene 2024     Pruritus 2024     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 2021     Sepsis due to pneumonia ([facility]) 2021     Hyponatremia 2021     Pulmonary nodule 2021     Type 2 diabetes mellitus with complication, without long-term current use of insulin ([facility]) 10/23/2017     Essential hypertension 10/23/2017     Asthma 10/23/2017     Hyperlipidemia 10/23/2017       LOS (days): 3  Geometric Mean LOS (GMLOS) (days): 4.9  Days to GMLOS:1.8     OBJECTIVE:  Risk of Unplanned Readmission Score: 25.04         Current admission status: Inpatient   Preferred Pharmacy:   [facility] - [facility] [address] [address] [facility] [address]  Phone: [phone] Fax: [phone]    [facility] - [place], [place] - [address]  [address] [facility] [address]  Phone: [phone] Fax: [phone]    Primary Care Provider: Shayne Diaz MD    Primary Insurance: [facility]  Secondary Insurance:     DISCHARGE DETAILS:                                          Other Referral/Resources/Interventions Provided:  Interventions: Short Term Rehab  Referral Comments: CM met with pt and wife to review SNF choice list. MELISSA  re-opened referral in hopes of pt being accepting to a facility close to pt's home. CM will meet with pt and wife tomorrow to review STR choices.         Treatment Team Recommendation: Short Term Rehab  Discharge Destination Plan:: Short Term Rehab                                IMM Given (Date):: 10/02/24  IMM Given to:: Family  Family notified:: Wife  Additional Comments: CM met with pt and wife to review IMM and obtained verbal consent.

## 2024-10-02 NOTE — ASSESSMENT & PLAN NOTE
Blood pressure controlled   Continue amlodipine 5 mg p.o. twice daily and atenolol 25 mg p.o. daily  Monitor blood pressure per protocol

## 2024-10-02 NOTE — ASSESSMENT & PLAN NOTE
Day 11 post positive test, hospitalized 9/24/2024, received remdesivir and baricitinib  No need for isolation

## 2024-10-02 NOTE — ASSESSMENT & PLAN NOTE
POA  Met sepsis criteria with tachycardia and leukocytosis (noted to be on chronic steroids)  Lactic acid 2.6-->1.3 without intervention   Blood cultures x 2-1/2 growing staph coagulase negative bacteria felt likely a skin contaminant  Chest x-ray noted mild opacity in left base atelectasis versus pneumonia  Started on ceftriaxone and azithromycin in the ER, continue, D4  Appreciate input of pulmonology who are following  IV Solu-Medrol was weaned to prednisone starting today  Continue nebulizer treatments  Out of bed to chair, I-S  CBC a.m.

## 2024-10-02 NOTE — PLAN OF CARE
Problem: PHYSICAL THERAPY ADULT  Goal: Performs mobility at highest level of function for planned discharge setting.  See evaluation for individualized goals.  Description: Treatment/Interventions: Functional transfer training, LE strengthening/ROM, Elevations, Therapeutic exercise, Endurance training, Gait training          See flowsheet documentation for full assessment, interventions and recommendations.  Outcome: Progressing  Note: Prognosis: Good  Problem List: Decreased strength, Decreased endurance, Impaired balance  Assessment: Pt is 78 y.o. male seen for PT evaluation s/p admit to [facility] on 9/29/2024 w/ Dysmetria. PT consulted to assess pt's functional mobility and d/c needs. Order placed for PT eval and tx, w/ up and OOB as tolerated order. Pt agreeable to PT  session upon arrival, pt found seated OOB in recliner.  PTA, pt was independent w/ all functional mobility w/ walking stick, lives w/ spouse in 2 level home, and retired.  Pt to benefit from continued PT tx to address deficits and maximize level of functional independent mobility and consistency. Upon conclusion pt  seated in recliner. Complexity: Comorbidities affecting pt's physical performance at time of assessment include: COPD, DM, htn, and respiratory failure. Personal factors affecting pt at time of IE include: advanced age, lives in multistory home, ambulating with assistive device, and steps to enter home. Please find objective findings from PT assessment regarding body systems outlined above with impairments and limitations including weakness, impaired balance, impaired coordination, and fall risk.  Pt's clinical presentation is currently unstable/unpredictable seen in pt's presentation of abnormal renal values, abnormal H&H, abnormal WBC count, abnormal sodium levels, and abnormal blood sugar levels. The patient's AM-PAC Basic Mobility Inpatient Short Form Raw Score is 18 .  Based on patient presentations and impairments, pt  would most appropriately benefit from Level 2 resource intensity upon discharge. Please also refer to the recommendation of the Physical Therapist for safe discharge planning. RN verbalized pt appropriate for PT session.        Rehab Resource Intensity Level, PT: II (Moderate Resource Intensity)    See flowsheet documentation for full assessment.

## 2024-10-02 NOTE — PROGRESS NOTES
Progress Note - Pulmonary   Gold Van 78 y.o. male MRN: [id number]  Unit/Bed#: -01 Encounter: [id number]      Assessment:  Acute hypoxic respiratory failure-  Improving, now on room air, less dyspnea since admission no significant cough  likely from #2, 4, possible myocardial dysfunction/mild CHF given the TTE findings  Pneumonia-superimposed bacterial/postviral COVID-19  Recent COVID-19 tracheobronchitis  Asthma/COPD  last FEV1 of 50%/significant BD response  No signs of severe exacerbation  CHF  EF 50%/mild hypokinesis at the posterior wall  Possibly a new ACS/however troponin was negative x 3 on admission  Former tobacco abuse-30-pack-year quit in the 1990s  Acute CVA-frontoparietal on both sides presented with right-sided weakness 10/1    Plan:  Sputum cultures + for Pseudomonas, pending MRSA swab  May switch to cefepime for 5 days then stop, follow sensitivities  Prednisone 40 mg for 2 more days then stop  3% sodium chloride/Xopenex tid   Treatment for acute stroke/low EF as per the primary team/neurology/cardiolog     Discussed with the primary team    Subjective:   No overnight acute events.  Feels stronger, breathing the same, no significant cough/less frequency.  No dyspnea at rest, feels easier to breathe compared to the admission time but no change from yesterday.    Vitals: Blood pressure 133/73, pulse 70, temperature 97.6 °F (36.4 °C), temperature source Oral, resp. rate 18, height 6' (1.829 m), weight 71.7 kg (158 lb), SpO2 94%., Body mass index is 21.43 kg/m².      Intake/Output Summary (Last 24 hours) at 10/2/2024 1439  Last data filed at 10/2/2024 1300  Gross per 24 hour   Intake 2205 ml   Output 1675 ml   Net 530 ml       Physical Exam  Gen: not in acute distress, sitting in chair, Body mass index is 21.43 kg/m².   HEENT:supple, no accessory muscle use, no JVD appreciated  Cardiac: RRR, no murmurs appreciated  Lungs: normal respiratory effort, mild/fine crackles posterior/basal,  otherwise clear breath sounds bilaterally  Abdomen: soft, nontender  Extremities: Very mild/trace pitting edema at the ankles  Neuro: AAO X3, no focal motor deficit    Labs: I have personally reviewed pertinent lab results.        Shakir Woods MD

## 2024-10-02 NOTE — ARC ADMISSION
3.) During the past 4 weeks, how would you rate your health?:  Fair     6 a.) How many servings of Fruits and Vegetables do you have each day ( 1 serving = 1 piece of fruit, 1/2 cup fruits or vegetables):  1 per day     11d.) Bodily pain:  Often     ' [facility]  contacted the patient's spouse Mya: Voicemail was left with the patient's spouse with information provided on the availability of [facility]’s Acute Rehab. Left callback number to discuss [facility] program and services offered and acute rehab criteria. Will f/u with campus choice after contact has been made.    Thank you,  Corrine Schilling  [facility] Admissions

## 2024-10-02 NOTE — ASSESSMENT & PLAN NOTE
Suspected COPD exacerbation  Arrived to ER on CPAP, transferred over to [facility] for 2 hours .  Received IV steroids while in the ER   Evaluated by critical care who felt that patient is stable for medical admission  Appreciate input of pulmonology who are following  Was receiving IV Solu-Medrol transitioned to oral prednisone starting today  Continue Zithromax and ceftriaxone  Continue nebulizer treatments  Encourage out of bed to chair  Incentive spirometry  Respiratory protocol-patient has been weaned to 1 L nasal cannula oxygen as of today

## 2024-10-02 NOTE — ASSESSMENT & PLAN NOTE
Takes prednisone chronically 10 mg alternating with 15 mg.    Home regimen currently on hold, is receiving steroid for COPD exacerbation

## 2024-10-03 ENCOUNTER — TELEPHONE (OUTPATIENT)
Age: 79
End: 2024-10-03

## 2024-10-03 PROBLEM — R93.1 ABNORMAL ECHOCARDIOGRAM: Status: ACTIVE | Noted: 2024-10-03

## 2024-10-03 LAB
ANION GAP SERPL CALCULATED.3IONS-SCNC: 7 MMOL/L (ref 4–13)
BUN SERPL-MCNC: 21 MG/DL (ref 5–25)
CALCIUM SERPL-MCNC: 8.5 MG/DL (ref 8.4–10.2)
CHLORIDE SERPL-SCNC: 101 MMOL/L (ref 96–108)
CO2 SERPL-SCNC: 26 MMOL/L (ref 21–32)
CREAT SERPL-MCNC: 0.91 MG/DL (ref 0.6–1.3)
ERYTHROCYTE [DISTWIDTH] IN BLOOD BY AUTOMATED COUNT: 13.3 % (ref 11.6–15.1)
GFR SERPL CREATININE-BSD FRML MDRD: 80 ML/MIN/1.73SQ M
GLUCOSE SERPL-MCNC: 109 MG/DL (ref 65–140)
GLUCOSE SERPL-MCNC: 120 MG/DL (ref 65–140)
GLUCOSE SERPL-MCNC: 193 MG/DL (ref 65–140)
GLUCOSE SERPL-MCNC: 222 MG/DL (ref 65–140)
GLUCOSE SERPL-MCNC: 300 MG/DL (ref 65–140)
HCT VFR BLD AUTO: 36.5 % (ref 36.5–49.3)
HGB BLD-MCNC: 12 G/DL (ref 12–17)
MCH RBC QN AUTO: 29.9 PG (ref 26.8–34.3)
MCHC RBC AUTO-ENTMCNC: 32.9 G/DL (ref 31.4–37.4)
MCV RBC AUTO: 91 FL (ref 82–98)
MRSA NOSE QL CULT: NORMAL
PLATELET # BLD AUTO: 254 THOUSANDS/UL (ref 149–390)
PMV BLD AUTO: 9.5 FL (ref 8.9–12.7)
POTASSIUM SERPL-SCNC: 4 MMOL/L (ref 3.5–5.3)
RBC # BLD AUTO: 4.01 MILLION/UL (ref 3.88–5.62)
SODIUM SERPL-SCNC: 134 MMOL/L (ref 135–147)
WBC # BLD AUTO: 9.7 THOUSAND/UL (ref 4.31–10.16)

## 2024-10-03 PROCEDURE — 94664 DEMO&/EVAL PT USE INHALER: CPT

## 2024-10-03 PROCEDURE — 97116 GAIT TRAINING THERAPY: CPT

## 2024-10-03 PROCEDURE — 97530 THERAPEUTIC ACTIVITIES: CPT

## 2024-10-03 PROCEDURE — 94760 N-INVAS EAR/PLS OXIMETRY 1: CPT

## 2024-10-03 PROCEDURE — 82948 REAGENT STRIP/BLOOD GLUCOSE: CPT

## 2024-10-03 PROCEDURE — 99222 1ST HOSP IP/OBS MODERATE 55: CPT | Performed by: NURSE PRACTITIONER

## 2024-10-03 PROCEDURE — 80048 BASIC METABOLIC PNL TOTAL CA: CPT

## 2024-10-03 PROCEDURE — 85027 COMPLETE CBC AUTOMATED: CPT

## 2024-10-03 PROCEDURE — 99233 SBSQ HOSP IP/OBS HIGH 50: CPT

## 2024-10-03 PROCEDURE — 94640 AIRWAY INHALATION TREATMENT: CPT

## 2024-10-03 RX ADMIN — GUAIFENESIN 600 MG: 600 TABLET ORAL at 08:01

## 2024-10-03 RX ADMIN — SODIUM CHLORIDE SOLN NEBU 3% 4 ML: 3 NEBU SOLN at 07:06

## 2024-10-03 RX ADMIN — LISINOPRIL 20 MG: 20 TABLET ORAL at 08:01

## 2024-10-03 RX ADMIN — INSULIN LISPRO 2 UNITS: 100 INJECTION, SOLUTION INTRAVENOUS; SUBCUTANEOUS at 12:38

## 2024-10-03 RX ADMIN — GUAIFENESIN 600 MG: 600 TABLET ORAL at 18:16

## 2024-10-03 RX ADMIN — ENOXAPARIN SODIUM 40 MG: 40 INJECTION SUBCUTANEOUS at 08:03

## 2024-10-03 RX ADMIN — ATENOLOL 25 MG: 25 TABLET ORAL at 08:01

## 2024-10-03 RX ADMIN — PREDNISONE 40 MG: 20 TABLET ORAL at 08:01

## 2024-10-03 RX ADMIN — CEFEPIME HYDROCHLORIDE 2000 MG: 2 INJECTION, SOLUTION INTRAVENOUS at 04:17

## 2024-10-03 RX ADMIN — LEVALBUTEROL HYDROCHLORIDE 1.25 MG: 1.25 SOLUTION RESPIRATORY (INHALATION) at 20:47

## 2024-10-03 RX ADMIN — ATORVASTATIN CALCIUM 40 MG: 40 TABLET, FILM COATED ORAL at 18:16

## 2024-10-03 RX ADMIN — AMLODIPINE BESYLATE 5 MG: 5 TABLET ORAL at 08:01

## 2024-10-03 RX ADMIN — LEVALBUTEROL HYDROCHLORIDE 1.25 MG: 1.25 SOLUTION RESPIRATORY (INHALATION) at 13:20

## 2024-10-03 RX ADMIN — INSULIN LISPRO 8 UNITS: 100 INJECTION, SOLUTION INTRAVENOUS; SUBCUTANEOUS at 16:32

## 2024-10-03 RX ADMIN — LISINOPRIL 20 MG: 20 TABLET ORAL at 22:01

## 2024-10-03 RX ADMIN — MONTELUKAST 10 MG: 10 TABLET, FILM COATED ORAL at 21:58

## 2024-10-03 RX ADMIN — SODIUM CHLORIDE SOLN NEBU 3% 4 ML: 3 NEBU SOLN at 13:20

## 2024-10-03 RX ADMIN — SODIUM CHLORIDE SOLN NEBU 3% 4 ML: 3 NEBU SOLN at 20:47

## 2024-10-03 RX ADMIN — AMLODIPINE BESYLATE 5 MG: 5 TABLET ORAL at 22:01

## 2024-10-03 RX ADMIN — INSULIN LISPRO 2 UNITS: 100 INJECTION, SOLUTION INTRAVENOUS; SUBCUTANEOUS at 07:59

## 2024-10-03 RX ADMIN — FAMOTIDINE 20 MG: 20 TABLET, FILM COATED ORAL at 08:01

## 2024-10-03 RX ADMIN — INSULIN LISPRO 4 UNITS: 100 INJECTION, SOLUTION INTRAVENOUS; SUBCUTANEOUS at 21:59

## 2024-10-03 RX ADMIN — CEFEPIME HYDROCHLORIDE 2000 MG: 2 INJECTION, SOLUTION INTRAVENOUS at 16:02

## 2024-10-03 RX ADMIN — CLOPIDOGREL 75 MG: 75 TABLET ORAL at 08:01

## 2024-10-03 RX ADMIN — INSULIN GLARGINE 15 UNITS: 100 INJECTION, SOLUTION SUBCUTANEOUS at 21:59

## 2024-10-03 RX ADMIN — LEVALBUTEROL HYDROCHLORIDE 1.25 MG: 1.25 SOLUTION RESPIRATORY (INHALATION) at 07:06

## 2024-10-03 RX ADMIN — ASPIRIN 81 MG 81 MG: 81 TABLET ORAL at 08:01

## 2024-10-03 RX ADMIN — FAMOTIDINE 20 MG: 20 TABLET, FILM COATED ORAL at 18:16

## 2024-10-03 NOTE — ASSESSMENT & PLAN NOTE
CTA head and neck results reviewed  MRI brain: Multiple foci of acute infarcts involving bilateral frontoparietal cortices and subcortical white matter (left greater than right). No mass effect or hemorrhagic transformation. Old right centrum semiovale lacunar infarct. Mild chronic microangiopathic change. Acute or subacute sinus disease as described.  Appreciate input of neurology-recommend dual antiplatelet therapy x 90 days with Plavix and aspirin, then aspirin monotherapy.  Continue statin.   CT chest abdomen pelvis without evidence of malignancy  TTE: LVEF 50%.  Did note mild hypokinesia of the posterior wall   Evaluated by cardiology planning outpatient event monitor and stress test  PT/OT input appreciated  Case management following-patient is pending authorization for acute rehab

## 2024-10-03 NOTE — ASSESSMENT & PLAN NOTE
POA  Arrived in suspected COPD exacerbation/pneumonia on CPAP, de-escalated to BiPAP, further de-escalated to nasal cannula oxygen  See plan for COPD with asthma

## 2024-10-03 NOTE — CASE MANAGEMENT
Case Management Discharge Planning Note    Patient name Gold Van  Location /-01 MRN [id number]  : 1945 Date 10/3/2024       Current Admission Date: 2024  Current Admission Diagnosis: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[facility])   Patient Active Problem List    Diagnosis Date Noted Date Diagnosed    Abnormal echocardiogram 10/03/2024     CVA (cerebrovascular accident) ([facility]) 10/02/2024     Dysmetria 10/01/2024     COVID-19 2024     Acute respiratory insufficiency 2024     Shortness of breath 2024     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[facility]) 2024     History of pneumothorax 2024     Acute respiratory failure with hypoxia ([facility]) 2024     Non-recurrent bilateral inguinal hernia without obstruction or gangrene 2024     Pruritus 2024     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 2021     Sepsis due to pneumonia ([facility]) 2021     Hyponatremia 2021     Pulmonary nodule 2021     Type 2 diabetes mellitus with complication, without long-term current use of insulin ([facility]) 10/23/2017     Essential hypertension 10/23/2017     Asthma 10/23/2017     Hyperlipidemia 10/23/2017       LOS (days): 4  Geometric Mean LOS (GMLOS) (days): 4.9  Days to GMLOS:0.9     OBJECTIVE:  Risk of Unplanned Readmission Score: 20.07         Current admission status: Inpatient   Preferred Pharmacy:   [facility] - [facility] [id number] [facility] [address]  Phone: [phone] Fax: [phone]    [facility] - [place], [place] - [address]  [address]  [facility] [address]  Phone: [phone] Fax: [phone]    Primary Care Provider: Shayne Diaz MD    Primary Insurance: [employer]  REP  Secondary Insurance:     DISCHARGE DETAILS:                                          Other Referral/Resources/Interventions Provided:  Interventions: Acute Rehab  Referral Comments: CM emailed pt's wife STR  choice list. Wife chose  [facility]. CM reserved via [employer], and submitted for auth. CM will continue to follow.         Treatment Team Recommendation: Acute Rehab  Discharge Destination Plan:: Acute Rehab  Transport at Discharge : hospitals Ambulance

## 2024-10-03 NOTE — PHYSICAL THERAPY NOTE
"   PT Treatment Note    NAME:  Gold Van  DATE: 10/03/24    AGE:   78 y.o.  Mrn:   [id number]  ADMIT DX:  Shortness of breath [R06.02]  Pneumonia [J18.9]  COPD exacerbation (HCC) [J44.1]  Sepsis (HCC) [A41.9]  COVID-19 [U07.1]  Performed at least 2 patient identifiers during session: Name and Epic photo       10/03/24 1445   PT Last Visit   PT Visit Date 10/03/24   Note Type   Note Type Treatment   Pain Assessment   Pain Assessment Tool 0-10   Pain Score No Pain   Restrictions/Precautions   Weight Bearing Precautions Per Order No   Other Precautions Fall Risk   General   Chart Reviewed Yes   Cognition   Overall Cognitive Status WFL   Arousal/Participation Alert;Responsive   Attention Within functional limits   Orientation Level Oriented X4   Memory Within functional limits   Following Commands Follows all commands and directions without difficulty   Subjective   Subjective \"aixa been waiting for you today\"   Transfers   Sit to Stand 4  Minimal assistance   Additional items Assist x 1;Armrests;Increased time required   Stand to Sit 4  Minimal assistance   Additional items Assist x 1;Increased time required;Armrests   Ambulation/Elevation   Gait pattern R Foot drag;Improper Weight shift;Narrow KADIE;Forward Flexion;Excessively slow   Gait Assistance   (CGA)   Additional items Assist x 1;Verbal cues   Assistive Device Rolling walker   Distance 50 ft, then 365 ft   Balance   Static Sitting Good   Dynamic Sitting Fair +   Static Standing Fair   Dynamic Standing Fair   Ambulatory Fair   Activity Tolerance   Activity Tolerance Patient tolerated treatment well   Exercises   Neuro-Developmental Treatment   (Upper extremity ball squeezes and elastic band pull aparts,)   Squat   (Sit to stands, 2 sets of 6)   Assessment   Prognosis Good   Problem List Decreased strength;Decreased endurance;Impaired balance   Assessment Pt seen for PT treatment session this date with interventions consisting of gait training to normalize gait " pattern to decrease fall risk and therapeutic exercise to improve strength to improve functional mobility. Pt agreeable to PT treatment session upon arrival, pt found seated OOB in recliner, in no apparent distress, A&O x 4, and responsive. Since previous session, pt has made good progress as evidenced by increased distance ambulated and increased activity tolerance  Barriers during this session include fatigue.  Pt continues to be functioning below baseline level, and remains limited 2* factors listed above and including decreased strength and impaired  balance.  Pt prognosis for achieving goals is good, pending pt progress with hospitalization/medical status improvements, and indicated by motivated to participate in therapy, ability to follow cues, and supportive family. PT will continue to see pt during current hospitalization in order to address the deficits listed above and provide interventions consistent w/ POC in effort to achieve goals. Current goals and POC remain appropriate, pt continues to have rehab potential  Upon conclusion pt seated OOB in recliner. The patient's [facility] Basic Mobility Inpatient Short Form Raw Score is 18.  Based on patient presentations and impairments, pt would most appropriately benefit from Level 1 resource intensity upon discharge.  Please also refer to the recommendation of the Physical Therapist for safe discharge planning. RN verbalized pt appropriate for PT session.   Goals   Patient Goals To walk better   LTG Expiration Date 10/11/24   PT Treatment Day 2   Plan   Treatment/Interventions Functional transfer training;LE strengthening/ROM;Elevations;Therapeutic exercise;Endurance training;Gait training   Progress Progressing toward goals   PT Frequency 3-5x/wk   Discharge Recommendation   Rehab Resource Intensity Level, PT II (Moderate Resource Intensity)   AM-PAC Basic Mobility Inpatient   Turning in Flat Bed Without Bedrails 3   Lying on Back to Sitting on Edge of Flat Bed  Without Bedrails 3   Moving Bed to Chair 3   Standing Up From Chair Using Arms 3   Walk in Room 3   Climb 3-5 Stairs With Railing 3   Basic Mobility Inpatient Raw Score 18   Basic Mobility Standardized Score 41.05   [facility] Highest Level Of Mobility   -HL Goal 6: Walk 10 steps or more   -HLM Achieved 8: Walk 250 feet ot more       Co treatment with OT secondary to complex medical condition of pt, possible A of 2 required to achieve and maintain transitional movements, requiring the need of skilled therapeutic intervention of 2 therapists to achieve delivery of services.      Time In: 1445  Time Out: 1535  Total Treatment Minutes: 50    Domenico Robb PT

## 2024-10-03 NOTE — ASSESSMENT & PLAN NOTE
Posterior will motion abnormality  Asymptomatic  Will arrange for outpatient stress once recovered

## 2024-10-03 NOTE — CASE MANAGEMENT
Case Management Discharge Planning Note    Patient name Gold Van  Location /-01 MRN [id number]  : 1945 Date 10/3/2024       Current Admission Date: 2024  Current Admission Diagnosis: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[facility])   Patient Active Problem List    Diagnosis Date Noted Date Diagnosed    Abnormal echocardiogram 10/03/2024     CVA (cerebrovascular accident) ([facility]) 10/02/2024     Dysmetria 10/01/2024     COVID-19 2024     Acute respiratory insufficiency 2024     Shortness of breath 2024     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[facility]) 2024     History of pneumothorax 2024     Acute respiratory failure with hypoxia ([facility]) 2024     Non-recurrent bilateral inguinal hernia without obstruction or gangrene 2024     Pruritus 2024     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 2021     Sepsis due to pneumonia ([facility]) 2021     Hyponatremia 2021     Pulmonary nodule 2021     Type 2 diabetes mellitus with complication, without long-term current use of insulin ([facility]) 10/23/2017     Essential hypertension 10/23/2017     Asthma 10/23/2017     Hyperlipidemia 10/23/2017       LOS (days): 4  Geometric Mean LOS (GMLOS) (days): 4.9  Days to GMLOS:0.9     OBJECTIVE:  Risk of Unplanned Readmission Score: 19.91         Current admission status: Inpatient   Preferred Pharmacy:   [facility] - [facility] [address] [address] [facility] [address]  Phone: [phone] Fax: [phone]    [facility] - [place], [place] - [address]  [address]  [facility] [address]  Phone: [phone] Fax: [phone]    Primary Care Provider: Shayne Diaz MD    Primary Insurance: [employer] REP  Secondary Insurance:     DISCHARGE DETAILS:                                                                                                               Facility Insurance Auth Number: [id number]

## 2024-10-03 NOTE — UTILIZATION REVIEW
Continued Stay Review    Date: 10/3                      /    Current Patient Class: Inpatient  Current Level of Care: MEd/surg    HPI:78 y.o. male initially admitted on 9/29     Assessment/Plan:   Continue with IV sterdois, nebulizer treatments, IV abx. As per cardiology consult today, ECHO with posterior WMA, arrange for stress test as OP. Currently with decreased breath sounds.  Pending safe dc plan with case management assist.  To Acute rehab.     Medications:   Scheduled Medications:  amLODIPine, 5 mg, Oral, BID  aspirin, 81 mg, Oral, Daily  atenolol, 25 mg, Oral, Daily  atorvastatin, 40 mg, Oral, QPM  cefepime, 2,000 mg, Intravenous, Q12H  clopidogrel, 75 mg, Oral, Daily  enoxaparin, 40 mg, Subcutaneous, Daily  famotidine, 20 mg, Oral, BID  guaiFENesin, 600 mg, Oral, BID  insulin glargine, 15 Units, Subcutaneous, HS  insulin lispro, 2-12 Units, Subcutaneous, TID AC  insulin lispro, 2-12 Units, Subcutaneous, HS  levalbuterol, 1.25 mg, Nebulization, TID  lisinopril, 20 mg, Oral, BID  montelukast, 10 mg, Oral, HS  predniSONE, 40 mg, Oral, Daily  sodium chloride, 4 mL, Nebulization, TID      Continuous IV Infusions:     PRN Meds:  acetaminophen, 650 mg, Oral, Q6H PRN  albuterol, 2 puff, Inhalation, Q4H PRN  albuterol, 2.5 mg, Nebulization, Q4H PRN  benzonatate, 100 mg, Oral, TID PRN  LORazepam, 0.5 mg, Oral, HS PRN      Discharge Plan: TBD    Vital Signs (last 3 days)       Date/Time Temp Pulse Resp BP MAP (mmHg) SpO2 Calculated FIO2 (%) - Nasal Cannula Nasal Cannula O2 Flow Rate (L/min) O2 Device Patient Position - Orthostatic VS Collins Coma Scale Score Pain    10/03/24 1320 -- -- -- -- -- 93 % -- -- None (Room air) -- -- --    10/03/24 1300 -- -- -- -- -- -- -- -- -- -- 15 No Pain    10/03/24 11:06:41 97.7 °F (36.5 °C) 60 18 128/67 87 94 % -- -- -- -- -- --    10/03/24 0718 -- -- -- -- -- 92 % -- -- None (Room air) -- -- --    10/03/24 07:06:45 97.5 °F (36.4 °C) 88 16 142/81 101 94 % -- -- -- -- -- --     10/03/24 0300 97.6 °F (36.4 °C) 66 18 125/71 89 94 % -- -- -- -- 15 --    10/02/24 2300 97.7 °F (36.5 °C) 61 18 128/66 88 95 % -- -- None (Room air) -- 15 --    10/02/24 2100 -- -- -- -- -- -- -- -- -- -- -- No Pain    10/02/24 2030 -- -- -- -- -- -- -- -- -- -- 15 --    10/02/24 1953 -- -- 16 -- -- -- -- -- None (Room air) -- -- --    10/02/24 1900 97.5 °F (36.4 °C) 57 18 135/64 88 95 % -- -- None (Room air) -- 15 No Pain    10/02/24 1500 97.9 °F (36.6 °C) 73 18 110/59 76 95 % -- -- None (Room air) Sitting 15 --    10/02/24 1410 -- -- -- -- -- -- -- -- -- -- -- No Pain    10/02/24 1307 -- -- -- -- -- 94 % -- -- None (Room air) -- -- --    10/02/24 1100 97.6 °F (36.4 °C) 70 18 133/73 93 96 % -- -- -- Sitting 15 --    10/02/24 0711 -- -- -- -- -- 94 % 24 1 L/min Nasal cannula -- -- --    10/02/24 0710 -- -- -- -- -- -- 28 2 L/min Nasal cannula -- -- No Pain    10/02/24 0700 97.9 °F (36.6 °C) 76 18 130/71 91 95 % -- -- Nasal cannula Lying 15 --    10/02/24 0600 97.8 °F (36.6 °C) 77 18 131/66 88 97 % 24 1 L/min Nasal cannula Lying 15 --    10/02/24 0200 98 °F (36.7 °C) 84 16 120/63 82 96 % 24 1 L/min Nasal cannula Lying 15 --    10/01/24 2200 97.6 °F (36.4 °C) 82 18 119/66 84 96 % 24 1 L/min Nasal cannula Lying 15 --    10/01/24 2000 98.1 °F (36.7 °C) 80 18 135/68 90 100 % 24 1 L/min Nasal cannula -- 15 No Pain    10/01/24 1947 -- -- -- -- -- -- 24 1 L/min Nasal cannula -- -- --    10/01/24 1800 97.9 °F (36.6 °C) 75 18 115/59 78 96 % -- -- -- Sitting 15 --    10/01/24 1600 97.5 °F (36.4 °C) 71 19 118/66 83 96 % -- -- -- Sitting 15 --    10/01/24 1400 97.5 °F (36.4 °C) 77 18 127/71 90 96 % -- -- -- Lying 15 --    10/01/24 1339 -- -- -- -- -- 99 % -- -- -- -- -- --    10/01/24 1316 -- -- -- -- -- 97 % 24 1 L/min  Nasal cannula -- -- --    10/01/24 1300 97.6 °F (36.4 °C) 79 18 138/74 95 99 % -- -- Nasal cannula Lying 15 --    10/01/24 12:08:37 -- 72 16 126/74 91 97 % -- -- -- -- -- --    10/01/24 1200 97.6 °F (36.4 °C) 74  19 133/70 91 95 % -- -- -- Sitting 15 --    10/01/24 1100 97.5 °F (36.4 °C) 74 18 126/70 -- 94 % 28 2 L/min -- -- 15 --    10/01/24 1036 -- 97 -- 150/78 -- -- -- -- -- -- -- --    10/01/24 1009 -- -- -- -- -- -- 28 2 L/min Nasal cannula -- 15 No Pain    10/01/24 0912 -- -- -- -- -- -- -- -- -- -- -- No Pain    10/01/24 0900 -- -- -- -- -- -- -- -- -- -- -- No Pain    10/01/24 07:30:53 97.4 °F (36.3 °C) 97 19 150/78 102 89 % -- -- -- Lying -- --    10/01/24 0702 -- -- -- -- -- 92 % -- -- None (Room air) -- -- --    09/30/24 23:15:40 97.6 °F (36.4 °C) 76 18 128/67 87 93 % -- -- None (Room air) Lying -- --    09/30/24 20:20:57 -- 83 19 134/69 91 93 % -- -- None (Room air) -- -- --    09/30/24 2020 -- -- -- -- -- -- -- -- -- -- 15 No Pain    09/30/24 1958 -- -- -- -- -- -- -- -- None (Room air) -- -- --    09/30/24 14:05:22 -- 77 19 124/60 81 89 % -- -- -- -- -- --    09/30/24 0928 -- -- -- -- -- -- -- -- -- -- 15 --    09/30/24 0900 -- -- -- -- -- -- -- -- -- -- -- No Pain    09/30/24 07:48:50 97.7 °F (36.5 °C) 87 20 141/82 102 91 % -- -- -- -- -- --    09/30/24 0500 -- 75 -- -- -- 92 % -- -- None (Room air) -- -- --          Weight (last 2 days)       Date/Time Weight    10/01/24 1036 71.7 (158)            Pertinent Labs/Diagnostic Results:   Radiology:  CT chest abdomen pelvis w contrast   Final Interpretation by Jose E Stanley MD (10/02 1641)      Patchy consolidation in the left greater than right posterior/basilar lower lobes.   Differential includes scarring/atelectasis versus an infectious infiltrate.      No evidence of malignancy in the chest, abdomen or pelvis.      Prostatomegaly with numerous bladder diverticula consistent with a degree of outflow obstruction. Correlate with PSA.   Limited evaluation of the bladder wall in setting of hyperdense intraluminal contents, likely excreted recent IV contrast.      Bilateral common femoral and proximal SFA severe calcific atherosclerotic disease with likely  proximal SFA occlusions.   Correlate clinically.      The study was marked in EPIC for immediate notification.                  Workstation performed: [id number]         MRI brain wo contrast   Final Interpretation by Anca Weber MD (10/01 1311)      1.  Multiple foci of acute infarcts involving bilateral frontoparietal cortices and subcortical white matter (left greater than right). No mass effect or hemorrhagic transformation.   2.  Old right centrum semiovale lacunar infarct. Mild chronic microangiopathic change.   3.  Acute or subacute sinus disease as described.      Workstation performed: [id number]         CTA head and neck w wo contrast   Final Interpretation by Anca Weber MD (10/01 1238)      CT Brain:      1.  Findings this for acute or subacute cortical infarct in the left precentral gyrus. No intracranial hemorrhage or mass effect.   2.  Chronic right centrum semiovale lacunar infarct. Mild microangiopathic change.   3.  Acute or subacute sinusitis as described.      CT Angiography:      4.  Severe multifocal atherosclerotic stenosis in the cavernous and supraclinoid segments of bilateral internal carotid arteries.   5.  Severe atherosclerotic stenosis in pre-PICA segments of bilateral intracranial vertebral arteries.   6.  Focal moderate mid basilar artery stenosis.   7.  Atherosclerotic stenosis of the cervical ICAs, about 55% on the left and 50% on the right. No significant stenosis in the cervical vertebral arteries.   8.  No apparent intracranial aneurysm.                     I personally discussed this study with NIKO CARTER on 10/1/2024 12:35 PM.                        Workstation performed: [id number]         XR chest 1 view portable   ED Interpretation by Anand Stevens Jr.,  (09/29 1046)   Presumed left lingular infiltrate.      Final Interpretation by Staci Shah MD (09/29 1918)      Mild opacity in the left base which could be due to atelectasis or pneumonia.             Workstation performed: [id number]           Cardiology:  Echo complete w/ contrast if indicated   Final Result by Marin John MD (10/01 1108)        Left Ventricle: Left ventricular cavity size is normal. Wall thickness    is normal. The left ventricular ejection fraction is 50%. Systolic    function is low normal. There is mild hypokinesia of the posterior wall.    Diastolic function is mildly abnormal, consistent with grade I (abnormal)    relaxation.     Aortic Valve: There is mild regurgitation.     The right ventricular systolic pressure is normal. The estimated right    ventricular systolic pressure is 37.00 mmHg.         ECG 12 lead   Final Result by Marin John MD (09/29 2149)   Sinus rhythm with frequent Premature ventricular complexes   Nonspecific ST-t wave changes   When compared with ECG of 29-SEP-2024 10:14, (unconfirmed)   Heart rate has slowed    Confirmed by Marin John ([id number]) on 9/29/2024 9:49:31 PM      ECG 12 lead   Final Result by Marin John MD (09/29 2153)   Sinus tachycardia with Premature atrial complexes   Nonspecific ST-t wave changes   When compared with ECG of 28-APR-2024 10:15,   minor changes in ST configuration    Confirmed by Marin John ([id number]) on 9/29/2024 9:53:50 PM              Results from last 7 days   Lab Units 10/03/24  0427 10/02/24  0424 10/01/24  0421 09/30/24  0448 09/29/24  1022   WBC Thousand/uL 9.70 8.14 10.90* 11.07* 15.13*   HEMOGLOBIN g/dL 12.0 11.1* 11.6* 11.5* 13.5   HEMATOCRIT % 36.5 33.9* 35.1* 34.5* 39.8   PLATELETS Thousands/uL 254 222 235 223 275   TOTAL NEUT ABS Thousands/µL  --   --   --   --  13.02*         Results from last 7 days   Lab Units 10/03/24  0427 10/02/24  0424 10/01/24  0421 09/30/24  0448 09/29/24  1022   SODIUM mmol/L 134* 132* 133* 135 134*   POTASSIUM mmol/L 4.0 4.3 4.7 4.3 4.3   CHLORIDE mmol/L 101 102 101 102 98   CO2 mmol/L 26 23 23 23 25   ANION GAP mmol/L 7 7 9 10 11   BUN mg/dL 21 25 28* 23 23   CREATININE  "mg/dL 0.91 0.84 0.90 0.85 1.07   EGFR ml/min/1.73sq m 80 83 81 83 66   CALCIUM mg/dL 8.5 8.0* 8.7 8.5 9.3     Results from last 7 days   Lab Units 09/29/24  1022   AST U/L 13   ALT U/L 21   ALK PHOS U/L 59   TOTAL PROTEIN g/dL 7.0   ALBUMIN g/dL 3.4*   TOTAL BILIRUBIN mg/dL 0.87     Results from last 7 days   Lab Units 10/03/24  1055 10/03/24  0712 10/02/24  2112 10/02/24  1606 10/02/24  1114 10/02/24  0706 10/01/24  2119 10/01/24  1557 10/01/24  1128 10/01/24  0729 09/30/24  2134 09/30/24  1725   POC GLUCOSE mg/dl 193* 109 296* 188* 259* 370* 305* 280* 305* 251* 282* 401*     Results from last 7 days   Lab Units 10/03/24  0427 10/02/24  0424 10/01/24  0421 09/30/24  1733 09/30/24  0448 09/29/24  1022   GLUCOSE RANDOM mg/dL 120 244* 253* 446* 240* 279*         Results from last 7 days   Lab Units 10/02/24  0424   HEMOGLOBIN A1C % 10.6*   EAG mg/dl 258     No results found for: \"BETA-HYDROXYBUTYRATE\"       Results from last 7 days   Lab Units 09/29/24  1051   PH ERIN  7.388   PCO2 ERIN mm Hg 40.1*   PO2 ERIN mm Hg 49.7*   HCO3 ERIN mmol/L 23.6*   BASE EXC ERIN mmol/L -1.2   O2 CONTENT ERIN ml/dL 16.5   O2 HGB, VENOUS % 82.7*     Results from last 7 days   Lab Units 09/29/24  1406 09/29/24  1224 09/29/24  1022   HS TNI 0HR ng/L  --   --  10   HS TNI 2HR ng/L  --  9  --    HSTNI D2 ng/L  --  -1  --    HS TNI 4HR ng/L 9  --   --    HSTNI D4 ng/L -1  --   --          Results from last 7 days   Lab Units 09/29/24  1022   PROTIME seconds 13.8   INR  1.01   PTT seconds 22*         Results from last 7 days   Lab Units 10/02/24  0424 10/01/24  0421 09/29/24  1022   PROCALCITONIN ng/ml 0.21 0.41* 0.19     Results from last 7 days   Lab Units 09/29/24  1406 09/29/24  1031   LACTIC ACID mmol/L 1.3 2.6*             Results from last 7 days   Lab Units 09/29/24  1022   BNP pg/mL 139*         Results from last 7 days   Lab Units 10/01/24  0834 09/29/24  1022   BLOOD CULTURE   --  No Growth After 4 Days.  Staphylococcus coagulase " negative*   SPUTUM CULTURE  3+ Growth of Pseudomonas aeruginosa*  3+ Growth of Candida albicans*  3+ Growth of Streptococcus pneumoniae*  --    GRAM STAIN RESULT  1+ Polys*  1+ Gram negative rods*  1+ Gram positive cocci in pairs*  1+ Epithelial Cells* Gram positive cocci in clusters*                   Network Utilization Review Department  ATTENTION: Please call with any questions or concerns to [phone] and carefully listen to the prompts so that you are directed to the right person. All voicemails are confidential.   For Discharge needs, contact Care Management DC Support Team at [phone] opt. [phone]  Send all requests for admission clinical reviews, approved or denied determinations and any other requests to dedicated fax number below belonging to the campus where the patient is receiving treatment. List of dedicated fax numbers for the Facilities:  FACILITY NAME UR FAX NUMBER   ADMISSION DENIALS (Administrative/Medical Necessity) [phone]   DISCHARGE SUPPORT TEAM (NETWORK) [phone]   PARENT CHILD HEALTH (Maternity/NICU/Pediatrics) [phone]   [facility] [phone]   [facility] [phone]   [facility] [phone]   [facility] [phone]   [facility] [phone]   [facility] [phone]   [facility] [phone]   [facility] [phone]   [facility] [phone]   [facility] [phone]   [facility] [phone]   [facility] [phone]

## 2024-10-03 NOTE — CONSULTS
Consultation - Cardiology   Name: Gold Van 78 y.o. male I MRN: [id number]  Unit/Bed#: -01 I Date of Admission: 9/29/2024   Date of Service: 10/3/2024 I Hospital Day: 4   Inpatient consult to Cardiology  Consult performed by: BELGICA Geronimo  Consult ordered by: BELGICA Nelson        Physician Requesting Evaluation: Binh Hudr DO   Reason for Evaluation / Principal Problem: Stroke, need for ambulatory monitor and abnormal echo    Assessment & Plan  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HCC)  Per pulm/SLIM  Essential hypertension  Well controlled  Continue current medications  Sepsis due to pneumonia (HCC)  Resolved    Acute respiratory failure with hypoxia (HCC)  Primary reason for presentation  Due to COPD exac and pneumonia  CVA (cerebrovascular accident) (HCC)  Noted on imaging after dysmetria  Echo unremarkable, no evidence of afib on tele/ekg  Will arrange for outpatient amb monitor  Abnormal echocardiogram  Posterior will motion abnormality  Asymptomatic  Will arrange for outpatient stress once recovered    Other summary comments:   No evidence of afib on EKG or tele.  Echo with posterior WMA.  Will arrange for outpatient amb monitor to rule out afib and non-walking stress test to rule out ischemia.    Outpatient Cardiologist: None    HPI: Gold Van is a 78 y.o. year old male who presented with shortness of breath.  Of note, he was hospitalized and treated for COVID infection 9/24-9/26/2024.  He returned due to  development of respiratory distress, ultimately requiring BiPAP.    He was started on antibiotics for suspected pneumonia, and IV steroids for COPD exacerbation.    During the hospitalization, he had an episode of dysmetria and was evaluated by neurology.  Imaging showed diffuse intracranial atherosclerotic disease and multivessel severe stenosis, multifocal bihemispheric infarcts with at least some appearing watershed.    An echocardiogram was performed to assess for cardiac  thrombus and was unremarkable.  However, he was noted to have mild hypokinesia of the posterior wall.    Because of the abnormal echo and stroke, cardiology was consulted.    At the time of my evaluation, Scooter is doing well.  He notes his breathing has improved and he is on room air.  He denies any chest discomfort or change in stamina.  His weakness from the stroke is gradually improving.       EKG: Sinus rhythm with PVC's, nonspecific ST-T wave changes      MOST  RECENT CARDIAC IMAGING:   Echo 10/1/2024  EF 50%, mild hypokinesia of the posterior wall  Mild AI    Nuclear stress test 4/12/2022  Mild reduction in uptake present in the entire inferior location that is fixed, resolved with prone imaging  No evidence of stress-induced ischemia    Review of Systems: a 10 point review of systems was conducted and is negative except for as mentioned in the HPI or as below.        Historical Information   Past Medical History:   Diagnosis Date    Asthma     Diabetes mellitus (HCC)     Environmental allergies     Hyperlipidemia     Hypertension     Macular degeneration 07/13/2020    right eye    Orthostatic hypotension     Osteopenia     Pneumothorax     Sinusitis      Past Surgical History:   Procedure Laterality Date    COLONOSCOPY      KNEE CARTILAGE SURGERY Right 1964    VASECTOMY      WISDOM TOOTH EXTRACTION      x4     Social History     Substance and Sexual Activity   Alcohol Use Never     Social History     Substance and Sexual Activity   Drug Use Never     Social History     Tobacco Use   Smoking Status Former   Smokeless Tobacco Never   Tobacco Comments    quit about 25 years ago       Family History: No longer relevant due to patient age      Meds/Allergies   all current active meds have been reviewed    Medications Prior to Admission:     albuterol (ACCUNEB) 1.25 MG/3ML nebulizer solution    albuterol (PROVENTIL HFA,VENTOLIN HFA) 90 mcg/act inhaler    amLODIPine (NORVASC) 5 mg tablet    atenolol (TENORMIN) 25 mg  tablet    benzonatate (TESSALON PERLES) 100 mg capsule    Budeson-Glycopyrrol-Formoterol (Breztri Aerosphere) 160-9-4.8 MCG/ACT AERO    famotidine (PEPCID) 20 mg tablet    glipiZIDE (GLUCOTROL XL) 2.5 mg 24 hr tablet    lisinopril (ZESTRIL) 20 mg tablet    LORazepam (ATIVAN) 0.5 mg tablet    metFORMIN (GLUCOPHAGE) 500 mg tablet    montelukast (SINGULAIR) 10 mg tablet    [] predniSONE 20 mg tablet    predniSONE 5 mg tablet    ipratropium (ATROVENT) 0.02 % nebulizer solution    Allergies   Allergen Reactions    Ciprofloxacin Shortness Of Breath    Sulfamethoxazole Shortness Of Breath    Trimethoprim Shortness Of Breath    Dexamethasone Other (See Comments)    Triamcinolone Other (See Comments)    Bactrim [Sulfamethoxazole-Trimethoprim] Fever     Fever of 107       Objective   Vitals: Blood pressure 142/81, pulse 88, temperature 97.5 °F (36.4 °C), resp. rate 16, height 6' (1.829 m), weight 71.7 kg (158 lb), SpO2 92%., Body mass index is 21.43 kg/m².,   Orthostatic Blood Pressures      Flowsheet Row Most Recent Value   Blood Pressure 142/81 filed at 10/03/2024 0706   Patient Position - Orthostatic VS Sitting filed at 10/02/2024 1500            Systolic (24hrs), Av , Min:110 , Max:142     Diastolic (24hrs), Av, Min:59, Max:81      Physical Exam:    General:  Normal appearance in no distress.  Eyes:  Anicteric.  Oral mucosa:  Moist.  Neck:  No JVD. Carotid upstrokes are brisk without bruits.  No masses.  Chest:  Clear to auscultation.  Cardiac:  No palpable PMI.  Normal S1 and S2.  No murmur gallop or rub.  Abdomen:  Soft and nontender. No palpable organomegaly or aortic enlargement.  Extremities:  No peripheral edema.  Musculoskeletal:  Symmetric.   Vascular:  Pedal pulses are intact.  Neuro:  Grossly symmetric.  Psych:  Alert and oriented x3.        Lab Results:   Labs reviewed and prominent abnormalities reviewed above and/or below.    Troponins:    Results from last 7 days   Lab Units 24  1406  09/29/24  1224 09/29/24  1022   HS TNI 0HR ng/L  --   --  10   HS TNI 2HR ng/L  --  9  --    HSTNI D2 ng/L  --  -1  --    HS TNI 4HR ng/L 9  --   --    HSTNI D4 ng/L -1  --   --      BNP:   Results from last 6 Months   Lab Units 09/29/24  1022 09/24/24  0110   BNP pg/mL 139* 70

## 2024-10-03 NOTE — CASE MANAGEMENT
PR Support Center received request for authorization from Care Manager.  Authorization request submitted for: Acute Rehab  Facility Name: [facility] NPI: [id number]  Facility MD: Ashley DePadua NPI: [id number]  Authorization initiated by contacting insurance: [employer]   Via: [id number]   Clinicals submitted via Portal attachment   Pending Reference #:[id number]     Care Manager notified: Amara Johns     Updates to authorization status will be noted in chart. Please reach out to CM for updates on any clinical information.

## 2024-10-03 NOTE — ASSESSMENT & PLAN NOTE
Blood pressure controlled   Continue prehospital amlodipine, lisinopril, atenolol  Monitor blood pressure per protocol

## 2024-10-03 NOTE — PLAN OF CARE
Problem: OCCUPATIONAL THERAPY ADULT  Goal: Performs self-care activities at highest level of function for planned discharge setting.  See evaluation for individualized goals.  Description: Treatment Interventions: ADL retraining, Functional transfer training, UE strengthening/ROM, Endurance training, Patient/family training, Equipment evaluation/education, Compensatory technique education, Energy conservation, Activityengagement, Fine motor coordination activities, Neuromuscular reeducation          See flowsheet documentation for full assessment, interventions and recommendations.   Outcome: Progressing  Note: Limitation: Decreased ADL status, Decreased UE strength, Decreased Safe judgement during ADL, Decreased cognition, Decreased endurance, Decreased sensation, Decreased self-care trans, Decreased high-level ADLs (dec coordination, balance, functional reach)  Prognosis: Good  Assessment: Pt participated in Skilled OT session with interventions consisting of ADL retraining, functional transfer training, endurance training, patient/family training, equipment evaluation/education, compensatory technique education, energy conservation, and activity engagement in effort to:  increase BUE strength, fxl activity tolerance, static/dynamic sit/stand balance/tolerance for improved independence c ADLs/IADLs/fxl transfers/ADL related fxl mobility. Performance on this date includes fxl levels as stated in flowsheet.   Given fxl performance skills + deficits, therapist suspecting via clinical judgement + skilled analysis; pt currently requires stated assist above to perform each area of ADL d/t limitations including: generalized muscle weakness, sitting/standing balance deficits, fxl activity tolerance limitations, and new use of AD, RUE GMC/FMC coordination deficits. Occupational performance remains limited secondary to factors listed above and pt @ increased risk for falls and injury. Pt continues to fxn below baseline  d/t medical complexity impacting safe return home @ this time.  Since eval/previous treatment session, pt demonstrates  G motivation toward higher level of overall I/S.  D/t aforementioned factors, continuation of skilled occupational therapy services/comprehensive intervention are warranted in efforts to prepare patient for safe d/c to Short Term Rehab. Pt demonstrates WFL ability to collaboratively engage in d/c planning and supportive family involved Goals continued as stated in initial evaluation + therapy will continue to collaborate c pt regarding improvement in all deficit areas  including but not limited to: maximize I c ADLs/IADLs, safe fxl transfer training, energy conservation education, coordination of care, family education, equipment management (DME/AD) + d/c planning.     Rehab Resource Intensity Level, OT: II (Moderate Resource Intensity)

## 2024-10-03 NOTE — ASSESSMENT & PLAN NOTE
Suspected COPD exacerbation  Initially requiring CPAP, transitioned to BiPAP on arrival to the ED, then weaned to nasal cannula  Received IV steroids while in the ER   Appreciate input of pulmonology who are following  Was receiving IV Solu-Medrol transitioned to oral prednisone starting 10/2  Saved Zithromax and ceftriaxone, transitioned to cefepime on 10/2 given sputum C&S results positive Pseudomonas (sensitivities reviewed)  Continue nebulizer treatments  Encourage out of bed to chair  Incentive spirometry  Respiratory protocol-patient has weaned to room air as of 10/3

## 2024-10-03 NOTE — PLAN OF CARE

## 2024-10-03 NOTE — CASE MANAGEMENT
[facility] has received APPROVED authorization.  Insurance: [employer]   Called in by Rep: Stacia BAIRON#: [phone]  Authorization received for: Acute Rehab  Facility: [facility]   Authorization #: [id number]   Start of Care: 10/03  Next Review Date: 2 Business Days   Submit next review to: [employer] Portal / F#: [phone]    Care Manager notified: Amara Johns     Please reach out to  for updates on any clinical information.

## 2024-10-03 NOTE — ASSESSMENT & PLAN NOTE
POA  Met sepsis criteria with tachycardia and leukocytosis (noted to be on chronic steroids)  Lactic acid 2.6-->1.3 without intervention   Blood cultures x 2-1/2 growing staph coagulase negative bacteria felt likely a skin contaminant  Chest x-ray noted mild opacity in left base atelectasis versus pneumonia  Received ceftriaxone and azithromycin for total of 4 days through 10/2  Initiated cefepime 10/2 given positive Pseudomonas on sputum C&S  Appreciate input of pulmonology who are following  IV Solu-Medrol weaned to prednisone 10/2  Continue nebulizer treatments  Out of bed to chair, I-S  CBC a.m.

## 2024-10-03 NOTE — CASE MANAGEMENT
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[facility]) 2024     History of pneumothorax 2024     Acute respiratory failure with hypoxia ([facility]) 2024     Non-recurrent bilateral inguinal hernia without obstruction or gangrene 2024     Pruritus 2024     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 2021     Sepsis due to pneumonia ([facility]) 2021     Hyponatremia 2021     Pulmonary nodule 2021     Type 2 diabetes mellitus with complication, without long-term current use of insulin ([facility]) 10/23/2017     Essential hypertension 10/23/2017     Asthma 10/23/2017     Hyperlipidemia 10/23/2017       LOS (days): 4  Geometric Mean LOS (GMLOS) (days): 4.9  Days to GMLOS:0.8     OBJECTIVE:  Risk of Unplanned Readmission Score: 19.91         Current admission status: Inpatient   Preferred Pharmacy:   [facility] - [facility] [address] [address] [facility] [address]  Phone: [phone] Fax: [phone]    [facility] - [place] - [address]  [address]  [facility] [address]  Phone: [phone] Fax: [phone]    Primary Care Provider: Shayne Diaz MD    Primary Insurance: [employer]  REP  Secondary Insurance:     DISCHARGE DETAILS:                                          Other Referral/Resources/Interventions Provided:  Interventions: Acute Rehab, Transportation  Referral Comments: CM received  auth approval for ARU from D/C support. CM scheduled BLS transport for tomorrow 1300. CM notified [facility], facility, and pt's wife. CM will continue to follow.                     Number/Name of Dispatcher: [facility] / [phone]     ETA of Transport (Date): 10/04/24  ETA of Transport (Time): 1300                            Accepting Facility Name, City & State : [facility] [place] [facility] Entrance  [address] [place] [address]  Receiving Facility/Agency Phone Number: [phone]  Facility/Agency Fax Number: [phone]

## 2024-10-03 NOTE — ASSESSMENT & PLAN NOTE
Noted on imaging after dysmetria  Echo unremarkable, no evidence of afib on tele/ekg  Will arrange for outpatient amb monitor

## 2024-10-03 NOTE — PLAN OF CARE
Problem: PHYSICAL THERAPY ADULT  Goal: Performs mobility at highest level of function for planned discharge setting.  See evaluation for individualized goals.  Description: Treatment/Interventions: Functional transfer training, LE strengthening/ROM, Elevations, Therapeutic exercise, Endurance training, Gait training          See flowsheet documentation for full assessment, interventions and recommendations.  Outcome: Progressing  Note: Prognosis: Good  Problem List: Decreased strength, Decreased endurance, Impaired balance  Assessment: Pt seen for PT treatment session this date with interventions consisting of gait training to normalize gait pattern to decrease fall risk and therapeutic exercise to improve strength to improve functional mobility. Pt agreeable to PT treatment session upon arrival, pt found seated OOB in recliner, in no apparent distress, A&O x 4, and responsive. Since previous session, pt has made good progress as evidenced by increased distance ambulated and increased activity tolerance  Barriers during this session include fatigue.  Pt continues to be functioning below baseline level, and remains limited 2* factors listed above and including decreased strength and impaired  balance.  Pt prognosis for achieving goals is good, pending pt progress with hospitalization/medical status improvements, and indicated by motivated to participate in therapy, ability to follow cues, and supportive family. PT will continue to see pt during current hospitalization in order to address the deficits listed above and provide interventions consistent w/ POC in effort to achieve goals. Current goals and POC remain appropriate, pt continues to have rehab potential  Upon conclusion pt seated OOB in recliner. The patient's AM-PAC Basic Mobility Inpatient Short Form Raw Score is 18.  Based on patient presentations and impairments, pt would most appropriately benefit from Level 1 resource intensity upon discharge.  Please  also refer to the recommendation of the Physical Therapist for safe discharge planning. RN verbalized pt appropriate for PT session.        Rehab Resource Intensity Level, PT: II (Moderate Resource Intensity)    See flowsheet documentation for full assessment.

## 2024-10-03 NOTE — CASE MANAGEMENT
[facility] received request for transport authorization from Care Manager.   Insurance: [employer]   Type of transport: BLS ()     Per patient's insurance, no prior auth request is needed for BLS transport.     Care Manager notified: Amara Johns     Please reach out to CM for updates on any clinical information.

## 2024-10-03 NOTE — OCCUPATIONAL THERAPY NOTE
Occupational Therapy Progress Note     Patient Name: Gold Van  Today's Date: 10/3/2024  Problem List  Principal Problem:    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HCC)  Active Problems:    Type 2 diabetes mellitus with complication, without long-term current use of insulin (HCC)    Essential hypertension    Sepsis due to pneumonia (HCC)    Pruritus    Acute respiratory failure with hypoxia (HCC)    COVID-19    Dysmetria    CVA (cerebrovascular accident) (HCC)    Abnormal echocardiogram          10/03/24 1539   OT Last Visit   OT Visit Date 10/03/24   Note Type   Note Type Treatment   Pain Assessment   Pain Assessment Tool 0-10   Pain Score No Pain   Restrictions/Precautions   Weight Bearing Precautions Per Order No   Other Precautions Fall Risk   Lifestyle   Autonomy Pt lives in 2 story home c wife + @ baseline, performs ADLs I'ly, IADLs I'ly + fxl mobility I'ly c walking stick. (+) . Pt is retired.   Reciprocal Relationships Spouse   Service to Others Retired   Intrinsic Gratification Exercising 2-3x/wk   ADL   Eating Assistance 5  Supervision/Setup   Grooming Assistance 5  Supervision/Setup   UB Bathing Assistance 5  Supervision/Setup   LB Bathing Assistance 4  Minimal Assistance   UB Dressing Assistance 5  Supervision/Setup   LB Dressing Assistance 4  Minimal Assistance   LB Dressing Comments Recommending sock aide   Toileting Assistance  4  Minimal Assistance   Transfers   Sit to Stand 4  Minimal assistance   Additional items Assist x 1;Armrests   Stand to Sit 5  Supervision   Additional items Armrests;Verbal cues   Functional Mobility   Additional Comments Pt performed short distance ADL related fxl mobility in room  c CGA, with RW simulating toileting distances.   No overt LOB demonstrated + pt denies pain/ denies SOB/ denies dizziness during transitional movements. G safety awareness noted while navigating t/o room. Transitions to bedside recliner for remainder of session.   Coordination   Gross Motor RUE impaired    Fine Motor RUE impaired   In Hand Manipulation Provided -star for improved in-hand manipulation, dexterity FMC + GMC.   Cognition   Overall Cognitive Status WFL   Arousal/Participation Alert;Responsive;Cooperative   Attention Within functional limits   Orientation Level Oriented X4   Memory Within functional limits   Following Commands Follows all commands and directions without difficulty   Activity Tolerance   Activity Tolerance Patient tolerated treatment well   Assessment   Assessment Pt participated in Skilled OT session with interventions consisting of ADL retraining, functional transfer training, endurance training, patient/family training, equipment evaluation/education, compensatory technique education, energy conservation, and activity engagement in effort to:  increase BUE strength, fxl activity tolerance, static/dynamic sit/stand balance/tolerance for improved independence c ADLs/IADLs/fxl transfers/ADL related fxl mobility. Performance on this date includes fxl levels as stated in flowsheet.   Given fxl performance skills + deficits, therapist suspecting via clinical judgement + skilled analysis; pt currently requires stated assist above to perform each area of ADL d/t limitations including: generalized muscle weakness, sitting/standing balance deficits, fxl activity tolerance limitations, and new use of AD, RUE GMC/FMC coordination deficits. Occupational performance remains limited secondary to factors listed above and pt @ increased risk for falls and injury. Pt continues to fxn below baseline d/t medical complexity impacting safe return home @ this time.  Since eval/previous treatment session, pt demonstrates  G motivation toward higher level of overall I/S.  D/t aforementioned factors, continuation of skilled occupational therapy services/comprehensive intervention are warranted in efforts to prepare patient for safe d/c to Short Term Rehab. Pt demonstrates WFL ability to collaboratively  engage in d/c planning and supportive family involved Goals continued as stated in initial evaluation + therapy will continue to collaborate c pt regarding improvement in all deficit areas  including but not limited to: maximize I c ADLs/IADLs, safe fxl transfer training, energy conservation education, coordination of care, family education, equipment management (DME/AD) + d/c planning.   Plan   Treatment Interventions ADL retraining;Functional transfer training;UE strengthening/ROM;Endurance training;Patient/family training;Equipment evaluation/education;Compensatory technique education;Energy conservation;Activityengagement;Fine motor coordination activities;Neuromuscular reeducation   Goal Expiration Date 10/15/24   OT Treatment Day 1   OT Frequency 3-5x/wk   Discharge Recommendation   Rehab Resource Intensity Level, OT II (Moderate Resource Intensity)   AM-PAC Daily Activity Inpatient   Lower Body Dressing 3   Bathing 3   Toileting 3   Upper Body Dressing 3   Grooming 3   Eating 4   Daily Activity Raw Score 19   Daily Activity Standardized Score (Calc for Raw Score >=11) 40.22   AM-PAC Applied Cognition Inpatient   Following a Speech/Presentation 4   Understanding Ordinary Conversation 4   Taking Medications 4   Remembering Where Things Are Placed or Put Away 4   Remembering List of 4-5 Errands 3   Taking Care of Complicated Tasks 3   Applied Cognition Raw Score 22   Applied Cognition Standardized Score 47.83   End of Consult   Patient Position at End of Consult Bedside chair;All needs within reach   Nurse Communication Nurse aware of consult       Bing Persaud OTR/L

## 2024-10-03 NOTE — ASSESSMENT & PLAN NOTE
Lab Results   Component Value Date    HGBA1C 10.6 (H) 10/02/2024       Recent Labs     10/02/24  1606 10/02/24  2112 10/03/24  0712 10/03/24  1055   POCGLU 188* 296* 109 193*       Blood Sugar Average: Last 72 hrs:  (P) 285.0106562043820580    Target blood sugar inpatient 140-180   Optimized on current regimen  Continue SSI (dosing was increased to algorithm 4 due to hyperglycemia)  Continue Lantus 15 units at bedtime  Discontinue mealtime Humalog  Hold home glipizide and metformin, resume on discharge  Monitor glucose and adjust medication as needed  Hypoglycemia protocol  BMP a.m.

## 2024-10-03 NOTE — PROGRESS NOTES
Progress Note - Hospitalist   Name: Gold Van 78 y.o. male I MRN: [id number]  Unit/Bed#: -01 I Date of Admission: 9/29/2024   Date of Service: 10/3/2024 I Hospital Day: 4    Assessment & Plan  Acute respiratory failure with hypoxia (HCC)  POA  Arrived in suspected COPD exacerbation/pneumonia on CPAP, de-escalated to BiPAP, further de-escalated to nasal cannula oxygen  See plan for COPD with asthma    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HCC)  Suspected COPD exacerbation  Initially requiring CPAP, transitioned to BiPAP on arrival to the ED, then weaned to nasal cannula  Received IV steroids while in the ER   Appreciate input of pulmonology who are following  Was receiving IV Solu-Medrol transitioned to oral prednisone starting 10/2  Saved Zithromax and ceftriaxone, transitioned to cefepime on 10/2 given sputum C&S results positive Pseudomonas (sensitivities reviewed)  Continue nebulizer treatments  Encourage out of bed to chair  Incentive spirometry  Respiratory protocol-patient has weaned to room air as of 10/3  Sepsis due to pneumonia (HCC)  POA  Met sepsis criteria with tachycardia and leukocytosis (noted to be on chronic steroids)  Lactic acid 2.6-->1.3 without intervention   Blood cultures x 2-1/2 growing staph coagulase negative bacteria felt likely a skin contaminant  Chest x-ray noted mild opacity in left base atelectasis versus pneumonia  Received ceftriaxone and azithromycin for total of 4 days through 10/2  Initiated cefepime 10/2 given positive Pseudomonas on sputum C&S  Appreciate input of pulmonology who are following  IV Solu-Medrol weaned to prednisone 10/2  Continue nebulizer treatments  Out of bed to chair, I-S  CBC a.m.  Type 2 diabetes mellitus with complication, without long-term current use of insulin ([facility])  Lab Results   Component Value Date    HGBA1C 10.6 (H) 10/02/2024       Recent Labs     10/02/24  1606 10/02/24  2112 10/03/24  0712 10/03/24  1055   POCGLU 188* 296* 109 193*       Blood Sugar  Average: Last 72 hrs:  (P) [id number]    Target blood sugar inpatient 140-180   Optimized on current regimen  Continue SSI (dosing was increased to algorithm 4 due to hyperglycemia)  Continue Lantus 15 units at bedtime  Discontinue mealtime Humalog  Hold home glipizide and metformin, resume on discharge  Monitor glucose and adjust medication as needed  Hypoglycemia protocol  BMP a.m.  Essential hypertension  Blood pressure controlled   Continue prehospital amlodipine, lisinopril, atenolol  Monitor blood pressure per protocol  COVID-19  Hospitalized 9/24/2024, received remdesivir and baricitinib  Pruritus  Takes prednisone chronically 10 mg alternating with 15 mg.    Home regimen currently on hold, is receiving steroid for COPD exacerbation  Dysmetria  Patient evaluated by therapy who noted problems with coordination with the right leg.  Unclear onset as patient's wife relayed that patient had deconditioning/altered gait at home where he was leaning on furniture to walk after his hospital discharge  Discussed with neurology, recommended stroke workup   See plan for CVA   CVA (cerebrovascular accident) (HCC)  CTA head and neck results reviewed  MRI brain: Multiple foci of acute infarcts involving bilateral frontoparietal cortices and subcortical white matter (left greater than right). No mass effect or hemorrhagic transformation. Old right centrum semiovale lacunar infarct. Mild chronic microangiopathic change. Acute or subacute sinus disease as described.  Appreciate input of neurology-recommend dual antiplatelet therapy x 90 days with Plavix and aspirin, then aspirin monotherapy.  Continue statin.   CT chest abdomen pelvis without evidence of malignancy  TTE: LVEF 50%.  Did note mild hypokinesia of the posterior wall   Evaluated by cardiology planning outpatient event monitor and stress test  PT/OT input appreciated  Case management following-patient is pending authorization for acute rehab  Abnormal  echocardiogram  Appreciate input of cardiology  TTE had posterior wall motion abnormalities  Cardiology planning outpatient monitor and stress test    VTE Pharmacologic Prophylaxis: VTE Score: 6 High Risk (Score >/= 5) - Pharmacological DVT Prophylaxis Ordered: enoxaparin (Lovenox). Sequential Compression Devices Ordered.    Mobility:   Basic Mobility Inpatient Raw Score: 18  JH-HLM Goal: 6: Walk 10 steps or more  JH-HLM Achieved: 6: Walk 10 steps or more  JH-HLM Goal achieved. Continue to encourage appropriate mobility.    Patient Centered Rounds: I performed bedside rounds with nursing staff today.   Discussions with Specialists or Other Care Team Provider: Cardiology, pulmonology, PT/OT, nursing, case management    Education and Discussions with Family / Patient: Updated  (wife) at bedside.    Current Length of Stay: 4 day(s)  Current Patient Status: Inpatient   Certification Statement: The patient will continue to require additional inpatient hospital stay due to continued treatment for COPD exacerbation with IV antibiotics pulmonology following, also pending authorization for rehab   Discharge Plan: Anticipate discharge tomorrow to rehab facility.    Code Status: Level 1 - Full Code    Subjective   Denies any dizziness, lightheadedness, chest pain or palpitations.  No shortness of breath.  Verbalizing needs.    Objective :  Temp:  [97.5 °F (36.4 °C)-97.9 °F (36.6 °C)] 97.7 °F (36.5 °C)  HR:  [57-88] 60  BP: (110-142)/(59-81) 128/67  Resp:  [16-18] 18  SpO2:  [92 %-95 %] 94 %  O2 Device: None (Room air)    Body mass index is 21.43 kg/m².     Input and Output Summary (last 24 hours):     Intake/Output Summary (Last 24 hours) at 10/3/2024 1312  Last data filed at 10/3/2024 1200  Gross per 24 hour   Intake 820 ml   Output 800 ml   Net 20 ml       Physical Exam  Vitals and nursing note reviewed.   Constitutional:       General: He is not in acute distress.     Appearance: He is well-developed.   HENT:       Head: Normocephalic and atraumatic.   Eyes:      Extraocular Movements: Extraocular movements intact.      Conjunctiva/sclera: Conjunctivae normal.      Pupils: Pupils are equal, round, and reactive to light.   Cardiovascular:      Rate and Rhythm: Normal rate and regular rhythm.      Pulses: Normal pulses.      Heart sounds: Normal heart sounds. No murmur heard.  Pulmonary:      Effort: Pulmonary effort is normal. No respiratory distress.      Breath sounds: No wheezing or rales.      Comments: Decreased breath sounds bilateral bases, no cough or shortness of breath  Abdominal:      General: There is no distension.      Palpations: Abdomen is soft.      Tenderness: There is no abdominal tenderness. There is no guarding.   Musculoskeletal:         General: No swelling.   Skin:     General: Skin is warm and dry.      Capillary Refill: Capillary refill takes less than 2 seconds.   Neurological:      General: No focal deficit present.      Mental Status: He is alert and oriented to person, place, and time. Mental status is at baseline.      Cranial Nerves: No cranial nerve deficit.      Sensory: No sensory deficit.      Motor: No weakness.      Comments: Gait not assessed at this time   Psychiatric:         Mood and Affect: Mood normal.         Behavior: Behavior normal.         Thought Content: Thought content normal.         Judgment: Judgment normal.           Lines/Drains:        Telemetry:  Telemetry Orders (From admission, onward)               24 Hour Telemetry Monitoring  Continuous x 24 Hours (Telem)        Question:  Reason for 24 Hour Telemetry  Answer:  TIA/Suspected CVA/ Confirmed CVA                     Telemetry Reviewed: Normal Sinus Rhythm  Indication for Continued Telemetry Use: No indication for continued use. Will discontinue.                Lab Results: I have reviewed the following results:   Results from last 7 days   Lab Units 10/03/24  0427 10/01/24  0421 09/30/24  0448 09/29/24  1022    WBC Thousand/uL 9.70   < > 11.07* 15.13*   HEMOGLOBIN g/dL 12.0   < > 11.5* 13.5   HEMATOCRIT % 36.5   < > 34.5* 39.8   PLATELETS Thousands/uL 254   < > 223 275   SEGS PCT %  --   --   --  86*   LYMPHO PCT %  --   --  5* 9*   MONO PCT %  --   --  2* 4   EOS PCT %  --   --  0 0    < > = values in this interval not displayed.     Results from last 7 days   Lab Units 10/03/24  0427 09/30/24  0448 09/29/24  1022   SODIUM mmol/L 134*   < > 134*   POTASSIUM mmol/L 4.0   < > 4.3   CHLORIDE mmol/L 101   < > 98   CO2 mmol/L 26   < > 25   BUN mg/dL 21   < > 23   CREATININE mg/dL 0.91   < > 1.07   ANION GAP mmol/L 7   < > 11   CALCIUM mg/dL 8.5   < > 9.3   ALBUMIN g/dL  --   --  3.4*   TOTAL BILIRUBIN mg/dL  --   --  0.87   ALK PHOS U/L  --   --  59   ALT U/L  --   --  21   AST U/L  --   --  13   GLUCOSE RANDOM mg/dL 120   < > 279*    < > = values in this interval not displayed.     Results from last 7 days   Lab Units 09/29/24  1022   INR  1.01     Results from last 7 days   Lab Units 10/03/24  1055 10/03/24  0712 10/02/24  2112 10/02/24  1606 10/02/24  1114 10/02/24  0706 10/01/24  2119 10/01/24  1557 10/01/24  1128 10/01/24  0729 09/30/24  2134 09/30/24  1725   POC GLUCOSE mg/dl 193* 109 296* 188* 259* 370* 305* 280* 305* 251* 282* 401*     Results from last 7 days   Lab Units 10/02/24  0424   HEMOGLOBIN A1C % 10.6*     Results from last 7 days   Lab Units 10/02/24  0424 10/01/24  0421 09/29/24  1406 09/29/24  1031 09/29/24  1022   LACTIC ACID mmol/L  --   --  1.3 2.6*  --    PROCALCITONIN ng/ml 0.21 0.41*  --   --  0.19       Recent Cultures (last 7 days):   Results from last 7 days   Lab Units 10/01/24  0834 09/29/24  1022   BLOOD CULTURE   --  No Growth After 4 Days.  Staphylococcus coagulase negative*   SPUTUM CULTURE  3+ Growth of Pseudomonas aeruginosa*  3+ Growth of Candida albicans*  3+ Growth of Streptococcus pneumoniae*  --    GRAM STAIN RESULT  1+ Polys*  1+ Gram negative rods*  1+ Gram positive cocci in  pairs*  1+ Epithelial Cells* Gram positive cocci in clusters*       Imaging Results Review: I reviewed radiology reports from this admission including: CTA head and neck: MRI brain.  Other Study Results Review: EKG was reviewed.  Other studies reviewed include: Echo    Last 24 Hours Medication List:     Current Facility-Administered Medications:     acetaminophen (TYLENOL) tablet 650 mg, Q6H PRN    albuterol (PROVENTIL HFA,VENTOLIN HFA) inhaler 2 puff, Q4H PRN    albuterol inhalation solution 2.5 mg, Q4H PRN    amLODIPine (NORVASC) tablet 5 mg, BID    aspirin chewable tablet 81 mg, Daily    atenolol (TENORMIN) tablet 25 mg, Daily    atorvastatin (LIPITOR) tablet 40 mg, QPM    benzonatate (TESSALON PERLES) capsule 100 mg, TID PRN    cefepime (MAXIPIME) IVPB (premix in dextrose) 2,000 mg 50 mL, Q12H, Last Rate: 2,000 mg (10/03/24 0419)    clopidogrel (PLAVIX) tablet 75 mg, Daily    enoxaparin (LOVENOX) subcutaneous injection 40 mg, Daily    famotidine (PEPCID) tablet 20 mg, BID    guaiFENesin (MUCINEX) 12 hr tablet 600 mg, BID    insulin glargine (LANTUS) subcutaneous injection 15 Units 0.15 mL, HS    insulin lispro (HumALOG/ADMELOG) 100 units/mL subcutaneous injection 2-12 Units, TID AC **AND** Fingerstick Glucose (POCT), TID AC    insulin lispro (HumALOG/ADMELOG) 100 units/mL subcutaneous injection 2-12 Units, HS    levalbuterol (XOPENEX) inhalation solution 1.25 mg, TID    lisinopril (ZESTRIL) tablet 20 mg, BID    LORazepam (ATIVAN) tablet 0.5 mg, HS PRN    montelukast (SINGULAIR) tablet 10 mg, HS    predniSONE tablet 40 mg, Daily    sodium chloride 3 % inhalation solution 4 mL, TID    Administrative Statements   Today, Patient Was Seen By: BELGICA Nelson  I have spent a total time of 36 minutes in caring for this patient on the day of the visit/encounter including Documenting in the medical record, Reviewing / ordering tests, medicine, procedures  , Obtaining or reviewing history  , and Communicating  with other healthcare professionals .    **Please Note: This note may have been constructed using a voice recognition system.**

## 2024-10-03 NOTE — ASSESSMENT & PLAN NOTE
Appreciate input of cardiology  TTE had posterior wall motion abnormalities  Cardiology planning outpatient monitor and stress test

## 2024-10-04 ENCOUNTER — HOSPITAL ENCOUNTER (INPATIENT)
Facility: HOSPITAL | Age: 79
LOS: 13 days | Discharge: HOME | DRG: 056 | End: 2024-10-17
Attending: FAMILY MEDICINE | Admitting: FAMILY MEDICINE
Payer: COMMERCIAL

## 2024-10-04 VITALS
SYSTOLIC BLOOD PRESSURE: 142 MMHG | RESPIRATION RATE: 16 BRPM | HEIGHT: 72 IN | TEMPERATURE: 97.6 F | DIASTOLIC BLOOD PRESSURE: 70 MMHG | OXYGEN SATURATION: 95 % | HEART RATE: 78 BPM | BODY MASS INDEX: 21.4 KG/M2 | WEIGHT: 158 LBS

## 2024-10-04 DIAGNOSIS — R93.1 ABNORMAL ECHOCARDIOGRAM: ICD-10-CM

## 2024-10-04 DIAGNOSIS — I63.9 CEREBROVASCULAR ACCIDENT (CVA), UNSPECIFIED MECHANISM (HCC): ICD-10-CM

## 2024-10-04 DIAGNOSIS — J18.9 SEPSIS DUE TO PNEUMONIA (HCC): ICD-10-CM

## 2024-10-04 DIAGNOSIS — A41.9 SEPSIS DUE TO PNEUMONIA (HCC): ICD-10-CM

## 2024-10-04 DIAGNOSIS — J45.909 ASTHMA: Chronic | ICD-10-CM

## 2024-10-04 DIAGNOSIS — I63.9 CVA (CEREBROVASCULAR ACCIDENT) (HCC): Primary | ICD-10-CM

## 2024-10-04 DIAGNOSIS — L29.9 GENERALIZED PRURITUS: ICD-10-CM

## 2024-10-04 DIAGNOSIS — E11.8 TYPE 2 DIABETES MELLITUS WITH COMPLICATION, WITHOUT LONG-TERM CURRENT USE OF INSULIN (HCC): Chronic | ICD-10-CM

## 2024-10-04 PROBLEM — G62.9 NEUROPATHY: Status: ACTIVE | Noted: 2024-10-04

## 2024-10-04 PROBLEM — Z78.9 IMPAIRED MOBILITY AND ACTIVITIES OF DAILY LIVING: Status: ACTIVE | Noted: 2024-10-04

## 2024-10-04 PROBLEM — Z74.09 IMPAIRED MOBILITY AND ACTIVITIES OF DAILY LIVING: Status: ACTIVE | Noted: 2024-10-04

## 2024-10-04 PROBLEM — K21.9 GASTROESOPHAGEAL REFLUX DISEASE WITHOUT ESOPHAGITIS: Status: ACTIVE | Noted: 2024-10-04

## 2024-10-04 PROBLEM — M21.372 FOOT DROP, LEFT: Status: ACTIVE | Noted: 2024-10-04

## 2024-10-04 LAB
BACTERIA BLD CULT: NORMAL
GLUCOSE SERPL-MCNC: 199 MG/DL (ref 65–140)
GLUCOSE SERPL-MCNC: 225 MG/DL (ref 65–140)
GLUCOSE SERPL-MCNC: 334 MG/DL (ref 65–140)
GLUCOSE SERPL-MCNC: 90 MG/DL (ref 65–140)

## 2024-10-04 PROCEDURE — 82948 REAGENT STRIP/BLOOD GLUCOSE: CPT

## 2024-10-04 PROCEDURE — 97110 THERAPEUTIC EXERCISES: CPT

## 2024-10-04 PROCEDURE — 97116 GAIT TRAINING THERAPY: CPT

## 2024-10-04 PROCEDURE — 94640 AIRWAY INHALATION TREATMENT: CPT

## 2024-10-04 PROCEDURE — 94760 N-INVAS EAR/PLS OXIMETRY 1: CPT

## 2024-10-04 PROCEDURE — NC001 PR NO CHARGE: Performed by: STUDENT IN AN ORGANIZED HEALTH CARE EDUCATION/TRAINING PROGRAM

## 2024-10-04 PROCEDURE — 99223 1ST HOSP IP/OBS HIGH 75: CPT | Performed by: STUDENT IN AN ORGANIZED HEALTH CARE EDUCATION/TRAINING PROGRAM

## 2024-10-04 PROCEDURE — 99239 HOSP IP/OBS DSCHRG MGMT >30: CPT

## 2024-10-04 RX ORDER — ALBUTEROL SULFATE 0.83 MG/ML
2.5 SOLUTION RESPIRATORY (INHALATION) EVERY 4 HOURS PRN
Status: DISCONTINUED | OUTPATIENT
Start: 2024-10-04 | End: 2024-10-17 | Stop reason: HOSPADM

## 2024-10-04 RX ORDER — GUAIFENESIN 600 MG/1
600 TABLET, EXTENDED RELEASE ORAL 2 TIMES DAILY
Status: DISCONTINUED | OUTPATIENT
Start: 2024-10-04 | End: 2024-10-17 | Stop reason: HOSPADM

## 2024-10-04 RX ORDER — LISINOPRIL 20 MG/1
20 TABLET ORAL 2 TIMES DAILY
Status: DISCONTINUED | OUTPATIENT
Start: 2024-10-04 | End: 2024-10-17 | Stop reason: HOSPADM

## 2024-10-04 RX ORDER — ATORVASTATIN CALCIUM 40 MG/1
40 TABLET, FILM COATED ORAL EVERY EVENING
Status: DISCONTINUED | OUTPATIENT
Start: 2024-10-04 | End: 2024-10-17 | Stop reason: HOSPADM

## 2024-10-04 RX ORDER — FAMOTIDINE 20 MG/1
20 TABLET, FILM COATED ORAL 2 TIMES DAILY
Status: DISCONTINUED | OUTPATIENT
Start: 2024-10-04 | End: 2024-10-17 | Stop reason: HOSPADM

## 2024-10-04 RX ORDER — INSULIN LISPRO 100 [IU]/ML
2-12 INJECTION, SOLUTION INTRAVENOUS; SUBCUTANEOUS
Status: DISCONTINUED | OUTPATIENT
Start: 2024-10-04 | End: 2024-10-17 | Stop reason: HOSPADM

## 2024-10-04 RX ORDER — SODIUM CHLORIDE FOR INHALATION 3 %
4 VIAL, NEBULIZER (ML) INHALATION
Status: DISCONTINUED | OUTPATIENT
Start: 2024-10-04 | End: 2024-10-17 | Stop reason: HOSPADM

## 2024-10-04 RX ORDER — ATENOLOL 25 MG/1
25 TABLET ORAL DAILY
Status: DISCONTINUED | OUTPATIENT
Start: 2024-10-05 | End: 2024-10-17 | Stop reason: HOSPADM

## 2024-10-04 RX ORDER — INSULIN GLARGINE 100 [IU]/ML
15 INJECTION, SOLUTION SUBCUTANEOUS
Status: DISCONTINUED | OUTPATIENT
Start: 2024-10-04 | End: 2024-10-04

## 2024-10-04 RX ORDER — CEFPODOXIME PROXETIL 200 MG/1
400 TABLET, FILM COATED ORAL 2 TIMES DAILY WITH MEALS
Status: COMPLETED | OUTPATIENT
Start: 2024-10-04 | End: 2024-10-06

## 2024-10-04 RX ORDER — ALBUTEROL SULFATE 90 UG/1
2 INHALANT RESPIRATORY (INHALATION) EVERY 4 HOURS PRN
Status: DISCONTINUED | OUTPATIENT
Start: 2024-10-04 | End: 2024-10-17 | Stop reason: HOSPADM

## 2024-10-04 RX ORDER — CEFPODOXIME PROXETIL 200 MG/1
400 TABLET, FILM COATED ORAL 2 TIMES DAILY WITH MEALS
Status: DISCONTINUED | OUTPATIENT
Start: 2024-10-04 | End: 2024-10-04 | Stop reason: HOSPADM

## 2024-10-04 RX ORDER — ENOXAPARIN SODIUM 100 MG/ML
40 INJECTION SUBCUTANEOUS DAILY
Status: DISCONTINUED | OUTPATIENT
Start: 2024-10-05 | End: 2024-10-17 | Stop reason: HOSPADM

## 2024-10-04 RX ORDER — INSULIN LISPRO 100 [IU]/ML
1-6 INJECTION, SOLUTION INTRAVENOUS; SUBCUTANEOUS
Status: DISCONTINUED | OUTPATIENT
Start: 2024-10-04 | End: 2024-10-17 | Stop reason: HOSPADM

## 2024-10-04 RX ORDER — ACETAMINOPHEN 325 MG/1
650 TABLET ORAL EVERY 6 HOURS PRN
Status: DISCONTINUED | OUTPATIENT
Start: 2024-10-04 | End: 2024-10-17 | Stop reason: HOSPADM

## 2024-10-04 RX ORDER — ATORVASTATIN CALCIUM 40 MG/1
40 TABLET, FILM COATED ORAL EVERY EVENING
Status: ON HOLD
Start: 2024-10-04 | End: 2024-10-16

## 2024-10-04 RX ORDER — LEVALBUTEROL INHALATION SOLUTION 1.25 MG/3ML
1.25 SOLUTION RESPIRATORY (INHALATION)
Status: DISCONTINUED | OUTPATIENT
Start: 2024-10-04 | End: 2024-10-17 | Stop reason: HOSPADM

## 2024-10-04 RX ORDER — BENZONATATE 100 MG/1
100 CAPSULE ORAL 3 TIMES DAILY PRN
Status: DISCONTINUED | OUTPATIENT
Start: 2024-10-04 | End: 2024-10-17 | Stop reason: HOSPADM

## 2024-10-04 RX ORDER — METFORMIN HYDROCHLORIDE 500 MG/1
500 TABLET, EXTENDED RELEASE ORAL
Status: DISCONTINUED | OUTPATIENT
Start: 2024-10-04 | End: 2024-10-05

## 2024-10-04 RX ORDER — CLOPIDOGREL BISULFATE 75 MG/1
75 TABLET ORAL DAILY
Start: 2024-10-05 | End: 2024-10-17

## 2024-10-04 RX ORDER — MONTELUKAST SODIUM 10 MG/1
10 TABLET ORAL
Status: DISCONTINUED | OUTPATIENT
Start: 2024-10-04 | End: 2024-10-17 | Stop reason: HOSPADM

## 2024-10-04 RX ORDER — ASPIRIN 81 MG/1
81 TABLET, CHEWABLE ORAL DAILY
Status: DISCONTINUED | OUTPATIENT
Start: 2024-10-05 | End: 2024-10-17 | Stop reason: HOSPADM

## 2024-10-04 RX ORDER — FAMOTIDINE 20 MG/1
20 TABLET, FILM COATED ORAL 2 TIMES DAILY
Start: 2024-10-04

## 2024-10-04 RX ORDER — CLOPIDOGREL BISULFATE 75 MG/1
75 TABLET ORAL DAILY
Status: DISCONTINUED | OUTPATIENT
Start: 2024-10-05 | End: 2024-10-17 | Stop reason: HOSPADM

## 2024-10-04 RX ORDER — AMLODIPINE BESYLATE 5 MG/1
5 TABLET ORAL 2 TIMES DAILY
Status: DISCONTINUED | OUTPATIENT
Start: 2024-10-04 | End: 2024-10-17 | Stop reason: HOSPADM

## 2024-10-04 RX ORDER — GLIPIZIDE 2.5 MG/1
2.5 TABLET, EXTENDED RELEASE ORAL 2 TIMES DAILY WITH MEALS
Status: DISCONTINUED | OUTPATIENT
Start: 2024-10-04 | End: 2024-10-17 | Stop reason: HOSPADM

## 2024-10-04 RX ORDER — ASPIRIN 81 MG/1
81 TABLET, CHEWABLE ORAL DAILY
Start: 2024-10-05

## 2024-10-04 RX ORDER — CEFPODOXIME PROXETIL 200 MG/1
400 TABLET, FILM COATED ORAL 2 TIMES DAILY WITH MEALS
Start: 2024-10-04 | End: 2024-10-17

## 2024-10-04 RX ORDER — INSULIN LISPRO 100 [IU]/ML
1-6 INJECTION, SOLUTION INTRAVENOUS; SUBCUTANEOUS
Status: DISCONTINUED | OUTPATIENT
Start: 2024-10-04 | End: 2024-10-04

## 2024-10-04 RX ORDER — PREDNISONE 5 MG/1
5 TABLET ORAL EVERY OTHER DAY
Status: DISCONTINUED | OUTPATIENT
Start: 2024-10-05 | End: 2024-10-17 | Stop reason: HOSPADM

## 2024-10-04 RX ORDER — LORAZEPAM 0.5 MG/1
0.5 TABLET ORAL
Status: DISCONTINUED | OUTPATIENT
Start: 2024-10-04 | End: 2024-10-17 | Stop reason: HOSPADM

## 2024-10-04 RX ADMIN — GUAIFENESIN 600 MG: 600 TABLET ORAL at 08:58

## 2024-10-04 RX ADMIN — CLOPIDOGREL 75 MG: 75 TABLET ORAL at 08:58

## 2024-10-04 RX ADMIN — MONTELUKAST 10 MG: 10 TABLET, FILM COATED ORAL at 21:24

## 2024-10-04 RX ADMIN — CEFEPIME HYDROCHLORIDE 2000 MG: 2 INJECTION, SOLUTION INTRAVENOUS at 04:50

## 2024-10-04 RX ADMIN — INSULIN LISPRO 4 UNITS: 100 INJECTION, SOLUTION INTRAVENOUS; SUBCUTANEOUS at 11:52

## 2024-10-04 RX ADMIN — AMLODIPINE BESYLATE 5 MG: 5 TABLET ORAL at 21:24

## 2024-10-04 RX ADMIN — INSULIN LISPRO 2 UNITS: 100 INJECTION, SOLUTION INTRAVENOUS; SUBCUTANEOUS at 16:58

## 2024-10-04 RX ADMIN — ALBUTEROL SULFATE 2 PUFF: 108 INHALANT RESPIRATORY (INHALATION) at 11:52

## 2024-10-04 RX ADMIN — METFORMIN ER 500 MG 500 MG: 500 TABLET ORAL at 16:03

## 2024-10-04 RX ADMIN — LORAZEPAM 0.5 MG: 0.5 TABLET ORAL at 21:24

## 2024-10-04 RX ADMIN — SODIUM CHLORIDE SOLN NEBU 3% 4 ML: 3 NEBU SOLN at 08:47

## 2024-10-04 RX ADMIN — LEVALBUTEROL HYDROCHLORIDE 1.25 MG: 1.25 SOLUTION RESPIRATORY (INHALATION) at 16:04

## 2024-10-04 RX ADMIN — SODIUM CHLORIDE SOLN NEBU 3% 4 ML: 3 NEBU SOLN at 16:04

## 2024-10-04 RX ADMIN — FAMOTIDINE 20 MG: 20 TABLET, FILM COATED ORAL at 17:28

## 2024-10-04 RX ADMIN — LEVALBUTEROL HYDROCHLORIDE 1.25 MG: 1.25 SOLUTION RESPIRATORY (INHALATION) at 20:37

## 2024-10-04 RX ADMIN — GLIPIZIDE 2.5 MG: 2.5 TABLET, EXTENDED RELEASE ORAL at 16:03

## 2024-10-04 RX ADMIN — GUAIFENESIN 600 MG: 600 TABLET ORAL at 17:28

## 2024-10-04 RX ADMIN — SODIUM CHLORIDE SOLN NEBU 3% 4 ML: 3 NEBU SOLN at 20:37

## 2024-10-04 RX ADMIN — LEVALBUTEROL HYDROCHLORIDE 1.25 MG: 1.25 SOLUTION RESPIRATORY (INHALATION) at 08:47

## 2024-10-04 RX ADMIN — LISINOPRIL 20 MG: 20 TABLET ORAL at 21:24

## 2024-10-04 RX ADMIN — ENOXAPARIN SODIUM 40 MG: 40 INJECTION SUBCUTANEOUS at 08:58

## 2024-10-04 RX ADMIN — ATORVASTATIN CALCIUM 40 MG: 40 TABLET, FILM COATED ORAL at 17:28

## 2024-10-04 RX ADMIN — AMLODIPINE BESYLATE 5 MG: 5 TABLET ORAL at 08:58

## 2024-10-04 RX ADMIN — FAMOTIDINE 20 MG: 20 TABLET, FILM COATED ORAL at 08:58

## 2024-10-04 RX ADMIN — LISINOPRIL 20 MG: 20 TABLET ORAL at 08:58

## 2024-10-04 RX ADMIN — CEFPODOXIME PROXETIL 400 MG: 200 TABLET, FILM COATED ORAL at 16:03

## 2024-10-04 RX ADMIN — ASPIRIN 81 MG 81 MG: 81 TABLET ORAL at 08:58

## 2024-10-04 RX ADMIN — ATENOLOL 25 MG: 25 TABLET ORAL at 08:58

## 2024-10-04 RX ADMIN — PREDNISONE 40 MG: 20 TABLET ORAL at 08:58

## 2024-10-04 RX ADMIN — INSULIN LISPRO 5 UNITS: 100 INJECTION, SOLUTION INTRAVENOUS; SUBCUTANEOUS at 21:23

## 2024-10-04 NOTE — CONSULTS
PHYSICAL MEDICINE AND REHABILITATION CONSULT NOTE  Gold Van 78 y.o. male MRN: [id number]  Unit/Bed#: -01 Encounter: [id number]     Rehab Diagnosis: Impairment of mobility, safety, Activities of Daily Living (ADLs), and cognitive/communication skills due to Stroke:  01.9 Other Stroke    Etiologic: Multiple foci of acute infarcts involving bilateral frontoparietal cortices and subcortical white matter (left greater than right)  Date of Onset: 9/29/24       History of Present Illness:   Gold Van is a 78 y.o. male with history of hypertension, type 2 diabetes, COPD, chronic steroid use who presented to the [facility] initially on 9/29/2024 for shortness of breath.  Patient with a history of COPD and asthma and recent COVID infection with hospitalization.  He arrived via ambulance with a CPAP on and then required a transition to BiPAP in the ER.  He was initiated on IV cefepime for suspected pneumonia and also IV steroids.  He met sepsis criteria for the tachycardia and leukocytosis in the setting of dental pneumonia.  He had some difficulty with coordination of his right leg during a PT session as of 10/1 and neurology evaluated the patient and recommended stroke.  An MRI of the brain did note multiple acute infarcts in the bilateral frontoparietal cortices and subcortical white matter changes without evidence of mass effect or hemorrhagic transformation.  Patient was started on dual antiplatelet therapy and statin and additionally had a CT of the chest abdomen pelvis to rule out other causes of hypercoagulability, stroke, malignancy.  Hypercoagulability it was thought to be related to his prior COVID infection.  He was on telemetry without any issues and no atrial fibrillation noted.  He did have a TTE that noted some wall motion abnormality in the posterior sections.  Plan for outpatient monitor and stress test.  Pulmonology also following the patient and he continued with  cefepime and azithromycin and is discharging on 2 additional days for coverage of Pseudomonas.  This was grown in the sputum and was recommended by pulmonology. The patient was evaluated by the Rehabilitation team and deemed an appropriate candidate for comprehensive inpatient rehabilitation and admitted to the [facility] on 10/4/2024  1:24 PM      Plan:     Rehabilitation  Functional deficits: impaired mobility, self care  Begin PT/OT/SLP.  Rehabilitation goals are to achieve a modified independent level with mobility and self care.  Prognosis is good.  ELOS is 10 to 14 days.  Estimated discharge is home.     DVT prophylaxis  Lovenox    Pain  Acetaminophen 650 mg every 6 hours as needed    Bladder plan  Continent    Bowel plan  Continent  Last bowel movement 10/3    Code Status  Level 1 full code        * CVA (cerebrovascular accident) (HCC)  Assessment & Plan  MRI revealed multiple foci of acute infarcts in the bilateral frontoparietal cortices and subcortical white matter left > right  Placed on DAPT and statin for secondary stroke prophylaxis per neurology and follow up with Neuro in 6 weeks (DAPT for 6 months)  Etiology thought to be coagulopathy secondary to COVID infection  CT CAP completed to rule out malignancy  TTE showing 50% EF but posterior wall hypokinesis and will need cardiology follow up  Patient endorses improvement in right sided weakness. Has ongoing dysmetria and incoordination in the right nahomi body  Physical, Occupational, and Speech therapy        Foot drop, left  Assessment & Plan  Patient states he has had issues with the foot for some time now  Presents with 1/5 ankle dorsiflexor strength on the left  Eval for bracing    Neuropathy  Assessment & Plan  Neuropathy in feet and lower 1/3 of the tibia bilaterally  Undiagnosed previously, but to consider in therapies  Likely related to diabetes  Consider gabapentin, however not uncomfortable at this time    Impaired mobility and activities of daily  living  Assessment & Plan  Patient was evaluated by the rehabilitation team MD and an appropriate candidate for acute inpatient rehabilitation program at this time.  The patient will tolerate 3 hours/day 5 to 7 days/week of intensive physical, occupational and speech therapy in order to obtain goals for community discharge  Due to the patient's functional Compared to their baseline level of function in addition to their ongoing medical needs, the patient would benefit from daily supervision from a rehabilitation physician as well as rehabilitation nursing to implement and adjust the medical as well as functional plan of care in order to meet the patient's goals.      Gastroesophageal reflux disease without esophagitis  Assessment & Plan  Continue pepcid    Abnormal echocardiogram  Assessment & Plan  Had TTE with posterior wall abnormalities/mild hypokinesis noted. EF 50%  Follow up with cardiology as an outpatient, would benefit from monitor and stress test    COVID-19  Assessment & Plan  Presented with shortness of breath and was in the hospital 9/24/24 for acute respiratory symptoms related to infection  Received remdesivir and baricitinib  Felt that strokes related COVID coagulopathy    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HCC)  Assessment & Plan  Suspected COPD exacerbation in setting of COVID infection  Initially requiring CPAP, transitioned to BiPAP, successfully weaned to room air  Appreciate input of pulmonology who followed  Received IV Solu-Medrol, transitioned to oral prednisone on 10/2  Continue robust regimen of meds for baseline COPD      Pruritus  Assessment & Plan  Multiple years history of generalized pruritus with extensive work up in past  On alternating prednisone doses  Monitor for any changes    Hyponatremia  Assessment & Plan  Patient presents with hyponatremia sodium of 134 as of 10/3  Continue to monitor on biweekly labs or sooner if clinically indicated    Sepsis due to pneumonia (HCC)  Assessment & Plan  Met  sepsis criteria on admission with tahcycardia, leukocytosis. Lactic acidosis. Also with acute repsiratory failure requiring CPAP-> BiPAP and eventually nasal canula  Blood cultures felt to be contaminants  CXR with left base opacity atelectasis vs pneumonia  S/P IV Ceftriaxone and azithromycin for 4 days  Sputum C&S was positive for Pseudomonas-received cefepime/Azithromycin while inpatient, transitioning to Vantin for total of 2 more days (5 days course of treatment as recommended by pulmonology)   Pulm followed in acute care  IV steroids weaned and now on prior chronic prednisone regimen    Hyperlipidemia  Assessment & Plan  Continue statin    Essential hypertension  Assessment & Plan  Atenolol 25 mg daily as well as amlodipine 5 mg twice daily and lisinopril 20 mg twice daily  Management per internal medicine and monitor blood pressures and therapy and on routine vitals    Type 2 diabetes mellitus with complication, without long-term current use of insulin (HCC)  Assessment & Plan  Lab Results   Component Value Date    HGBA1C 10.6 (H) 10/02/2024       Recent Labs     10/04/24  0731 10/04/24  1124 10/04/24  1618 10/04/24  2114   POCGLU 90 225* 199* 334*     Uncontrolled type 2 diabetes with A1C most recently of 10.6  Restarting home glipizide per IM and stopping Lantus  Wife to bring in Home metformin XL 750mg pills, on 500mg here for now  SSI and QID glucose checks  CCD                Subjective/Interval Events:       Review of Systems   Constitutional:  Negative for chills and fever.   HENT:  Negative for hearing loss and trouble swallowing.    Eyes:  Negative for photophobia and visual disturbance.   Respiratory:  Negative for cough and shortness of breath.    Cardiovascular:  Negative for chest pain and leg swelling.   Gastrointestinal:  Negative for constipation, diarrhea, nausea and vomiting.   Endocrine: Negative for cold intolerance and heat intolerance.   Genitourinary:  Negative for dysuria and  hematuria.   Musculoskeletal:  Negative for back pain and neck pain.   Skin:  Negative for rash and wound.   Allergic/Immunologic: Negative for environmental allergies and food allergies.        Many medication and antibiotic allergies   Neurological:  Positive for weakness. Negative for dizziness, light-headedness and numbness.   Hematological:  Negative for adenopathy. Does not bruise/bleed easily.   Psychiatric/Behavioral:  Negative for dysphoric mood and sleep disturbance.          Function:  PRIOR LEVEL OF FUNCTION:  He lives in a(n) single family home  Gold Van is  and lives with their spouse.  Self Care: Independent, Indoor Mobility: Independent, Stairs (in/outdoor): Independent, and Cognition: Independent     HOME ENVIRONMENT:  The living area: can live on one level  There are Greater than 10 steps to enter the home.    The patient will not have 24 hour supervision/physical assistance available upon discharge.    Current level of function:  Physical Therapy: Min assist for transfers and min assist for ambulation with rolling walker 50 feet x 2  Occupational Therapy: Supervision to set up for upper body ADLs and min assist for lower body ADLs and toileting  Speech Therapy: Not assessed    Physical Exam:  /63   Pulse 94   Temp 97.5 °F (36.4 °C)   Resp 18   Ht 6' (1.829 m)   Wt 70.6 kg (155 lb 10.3 oz)   SpO2 96%   BMI 21.11 kg/m²        Intake/Output Summary (Last 24 hours) at 10/4/2024 2244  Last data filed at 10/4/2024 1748  Gross per 24 hour   Intake 360 ml   Output --   Net 360 ml       Body mass index is 21.11 kg/m².      Physical Exam  Constitutional:       General: He is not in acute distress.  HENT:      Head: Normocephalic and atraumatic.      Right Ear: External ear normal.      Left Ear: External ear normal.      Nose: Nose normal. No rhinorrhea.      Mouth/Throat:      Mouth: Mucous membranes are moist.      Pharynx: Oropharynx is clear.      Comments: Utilizing  nebulizer  Eyes:      General: No scleral icterus.  Cardiovascular:      Rate and Rhythm: Normal rate.      Pulses: Normal pulses.   Pulmonary:      Effort: Pulmonary effort is normal. No respiratory distress.   Abdominal:      General: There is no distension.      Palpations: Abdomen is soft.   Musculoskeletal:      Right lower leg: Edema present.      Left lower leg: Edema present.   Skin:     General: Skin is warm and dry.   Neurological:      Mental Status: He is alert and oriented to person, place, and time.      Comments: Dysmetria and impaired coordination in the right nahomi body greater in the upper than lower. Left foot drop noted. Impaired sensation in the feet and just above the ankles. Strength 5/5   Psychiatric:         Mood and Affect: Mood normal.         Behavior: Behavior normal.            Labs, medications, and imaging personally reviewed.    Laboratory:    Lab Results   Component Value Date    SODIUM 134 (L) 10/03/2024    K 4.0 10/03/2024     10/03/2024    CO2 26 10/03/2024    BUN 21 10/03/2024    CREATININE 0.91 10/03/2024    GLUC 120 10/03/2024    CALCIUM 8.5 10/03/2024     Lab Results   Component Value Date    WBC 9.70 10/03/2024    HGB 12.0 10/03/2024    HCT 36.5 10/03/2024    MCV 91 10/03/2024     10/03/2024     Lab Results   Component Value Date    INR 1.01 09/29/2024    INR 0.95 08/04/2021    INR 1.16 03/24/2021    PROTIME 13.8 09/29/2024    PROTIME 12.7 08/04/2021    PROTIME 14.7 (H) 03/24/2021         Current Facility-Administered Medications:     acetaminophen (TYLENOL) tablet 650 mg, 650 mg, Oral, Q6H PRN, Annie L Dagnall, PA-C    albuterol (PROVENTIL HFA,VENTOLIN HFA) inhaler 2 puff, 2 puff, Inhalation, Q4H PRN, Annie L Dagnall, PA-C    albuterol inhalation solution 2.5 mg, 2.5 mg, Nebulization, Q4H PRN, Annie L Dagnall, PA-C    amLODIPine (NORVASC) tablet 5 mg, 5 mg, Oral, BID, Annie L Dagnall, PA-C, 5 mg at 10/04/24 2124    [START ON 10/5/2024] aspirin chewable  tablet 81 mg, 81 mg, Oral, Daily, Annie Martines PA-C    [START ON 10/5/2024] atenolol (TENORMIN) tablet 25 mg, 25 mg, Oral, Daily, Annie Martines PA-C    atorvastatin (LIPITOR) tablet 40 mg, 40 mg, Oral, QPM, Annie Martines, PA-C, 40 mg at 10/04/24 1728    benzonatate (TESSALON PERLES) capsule 100 mg, 100 mg, Oral, TID PRN, Annie Martines PA-C    Budeson-Glycopyrrol-Formoterol 160-9-4.8 MCG/ACT AERO 2 puff, 2 puff, Inhalation, Q12H, Annie Martines PA-C    cefpodoxime (VANTIN) tablet 400 mg, 400 mg, Oral, BID With Meals, Annie Martines PA-C, 400 mg at 10/04/24 1603    [START ON 10/5/2024] clopidogrel (PLAVIX) tablet 75 mg, 75 mg, Oral, Daily, Annie Martines PA-C    [START ON 10/5/2024] enoxaparin (LOVENOX) subcutaneous injection 40 mg, 40 mg, Subcutaneous, Daily, Annie Martines PA-C    famotidine (PEPCID) tablet 20 mg, 20 mg, Oral, BID, Annie Martines, PA-C, 20 mg at 10/04/24 1728    glipiZIDE (GLUCOTROL XL) 24 hr tablet 2.5 mg, 2.5 mg, Oral, BID With Meals, Annie Martines PA-C, 2.5 mg at 10/04/24 1603    guaiFENesin (MUCINEX) 12 hr tablet 600 mg, 600 mg, Oral, BID, Annie Martines PA-C, 600 mg at 10/04/24 1728    insulin lispro (HumALOG/ADMELOG) 100 units/mL subcutaneous injection 1-6 Units, 1-6 Units, Subcutaneous, HS, Anniesamra Izquierdol, PA-C, 5 Units at 10/04/24 2123    insulin lispro (HumALOG/ADMELOG) 100 units/mL subcutaneous injection 2-12 Units, 2-12 Units, Subcutaneous, TID AC, 2 Units at 10/04/24 1658 **AND** Fingerstick Glucose (POCT), , , 4x Daily AC and at bedtime, Annie L Dagnall, PA-C    levalbuterol (XOPENEX) inhalation solution 1.25 mg, 1.25 mg, Nebulization, TID, Annie L Dagnall, PA-C, 1.25 mg at 10/04/24 2037    lisinopril (ZESTRIL) tablet 20 mg, 20 mg, Oral, BID, Annie L Dagnall, PA-C, 20 mg at 10/04/24 2124    LORazepam (ATIVAN) tablet 0.5 mg, 0.5 mg, Oral, HS PRN, Annie L Dagnall, PA-C, 0.5 mg at 10/04/24 2124    metFORMIN (GLUCOPHAGE-XR) 24 hr  tablet 500 mg, 500 mg, Oral, Daily With Dinner, Annie BROWN Dagnall, PA-C, 500 mg at 10/04/24 1603    montelukast (SINGULAIR) tablet 10 mg, 10 mg, Oral, HS, Annie L Dagnall, PA-C, 10 mg at 10/04/24 2124    [START ON 10/6/2024] predniSONE tablet 15 mg, 15 mg, Oral, Every Other Day, Annie L Dagnall, PA-C    [START ON 10/5/2024] predniSONE tablet 5 mg, 5 mg, Oral, Every Other Day, Annie L Dagnall, PA-C    sodium chloride 3 % inhalation solution 4 mL, 4 mL, Nebulization, TID, Annie L Dagnall, PA-C, 4 mL at 10/04/24 2037  Allergies   Allergen Reactions    Ciprofloxacin Shortness Of Breath    Sulfamethoxazole Shortness Of Breath    Trimethoprim Shortness Of Breath    Dexamethasone Other (See Comments)    Triamcinolone Other (See Comments)    Bactrim [Sulfamethoxazole-Trimethoprim] Fever     Fever of 107      Patient Active Problem List    Diagnosis Date Noted    CVA (cerebrovascular accident) ([facility]) 10/02/2024    Gastroesophageal reflux disease without esophagitis 10/04/2024    Impaired mobility and activities of daily living 10/04/2024    Neuropathy 10/04/2024    Foot drop, left 10/04/2024    Abnormal echocardiogram 10/03/2024    Dysmetria 10/01/2024    COVID-19 09/24/2024    Acute respiratory insufficiency 09/24/2024    Shortness of breath 07/23/2024    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[facility]) 04/29/2024    History of pneumothorax 04/28/2024    Acute respiratory failure with hypoxia ([facility]) 04/28/2024    Non-recurrent bilateral inguinal hernia without obstruction or gangrene 03/18/2024    Pruritus 03/18/2024    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 06/16/2021    Sepsis due to pneumonia ([facility]) 03/24/2021    Hyponatremia 03/24/2021    Pulmonary nodule 03/24/2021    Type 2 diabetes mellitus with complication, without long-term current use of insulin ([facility]) 10/23/2017    Essential hypertension 10/23/2017    Asthma 10/23/2017    Hyperlipidemia 10/23/2017     Past Medical History:   Diagnosis Date    Asthma      Diabetes mellitus (HCC)     Environmental allergies     Hyperlipidemia     Hypertension     Macular degeneration 07/13/2020    right eye    Orthostatic hypotension     Osteopenia     Pneumothorax     Sinusitis      Past Surgical History:   Procedure Laterality Date    COLONOSCOPY      KNEE CARTILAGE SURGERY Right 1964    VASECTOMY      WISDOM TOOTH EXTRACTION      x4     Social History     Socioeconomic History    Marital status: /Civil Union     Spouse name: Not on file    Number of children: Not on file    Years of education: Not on file    Highest education level: Not on file   Occupational History    Not on file   Tobacco Use    Smoking status: Former    Smokeless tobacco: Never    Tobacco comments:     quit about 25 years ago   Vaping Use    Vaping status: Never Used   Substance and Sexual Activity    Alcohol use: Never    Drug use: Never    Sexual activity: Yes     Partners: Female   Other Topics Concern    Not on file   Social History Narrative    Daily caffeine use- 1 cup of tea, 2 cups of coffee     Social Determinants of Health     Financial Resource Strain: Not on file   Food Insecurity: No Food Insecurity (10/4/2024)    Hunger Vital Sign     Worried About Running Out of Food in the Last Year: Never true     Ran Out of Food in the Last Year: Never true   Transportation Needs: No Transportation Needs (10/4/2024)    PRAPARE - Transportation     Lack of Transportation (Medical): No     Lack of Transportation (Non-Medical): No   Physical Activity: Not on file   Stress: Not on file   Social Connections: Unknown (6/18/2024)    Received from [facility]    Social Connections     How often do you feel lonely or isolated from those around you? (Adult - for ages 18 years and over): Not on file   Intimate Partner Violence: Not on file   Housing Stability: Low Risk  (10/4/2024)    Housing Stability Vital Sign     Unable to Pay for Housing in the Last Year: No     Number of Times Moved in the Last Year: 0      Homeless in the Last Year: No     Social History     Tobacco Use   Smoking Status Former   Smokeless Tobacco Never   Tobacco Comments    quit about 25 years ago     Social History     Substance and Sexual Activity   Alcohol Use Never     Family History   Problem Relation Age of Onset    Asthma Mother     Emphysema Mother     Cancer Father     Asthma Brother     Cancer Brother          Medical Necessity Criteria for [facility] Admission: Electrolyte imbalance:  hyponatremia, Hypertension, Bowel/Bladder Management, Diabetes requiring close blood glucose monitoring, and Pneumonia. In addition, the preadmission screen, post-admission physical evaluation, overall plan of care and admissions order demonstrate a reasonable expectation that the following criteria were met at the time of admission to the [facility].  The patient requires active and ongoing therapeutic intervention of multiple therapy disciplines (physical therapy, occupational therapy, speech-language pathology, or prosthetics/orthotics), one of which is physical or occupational therapy.    Patient requires an intensive rehabilitation therapy program, as defined in Chapter 1, section 110.2.2 of the CMS Medicare Policy Manual. This intensive rehabilitation therapy program will consist of at least 3 hours of therapy per day at least 5 days per week or at least 15 hours of intensive rehabilitation therapy within a 7 consecutive day period, beginning with the date of admission to the [facility].    The patient is reasonably expected to actively participate in, and benefit significantly from, the intensive rehabilitation therapy program as defined in Chapter 1, section 110.2.2 of the CMS Medicare Policy Manual at this time of admission to the [facility]. He can reasonably be expected to make measurable improvement (that will be of practical value to improve the patient’s functional capacity or adaptation to impairments) as a result of the rehabilitation treatment, as defined in section  110.3, and such improvement can be expected to be made within the prescribed period of time. As noted in the CMS Medicare Policy Manual, the patient need not be expected to achieve complete independence in the domain of self-care nor be expected to return to his or her prior level of functioning in order to meet this standard.  The patient must require physician supervision by a rehabilitation physician. As such, a rehabilitation physician will conduct face-to-face visits with the patient at least 3 days per week throughout the patient’s stay in the [facility] to assess the patient both medically and functionally, as well as to modify the course of treatment as needed to maximize the patient’s capacity to benefit from the rehabilitation process.  The patient requires an intensive and coordinated interdisciplinary approach to providing rehabilitation, as defined in Chapter 1, section 110.2.5 of the CMS Medicare Policy Manual. This will be achieved through periodic team conferences, conducted at least once in a 7-day period, and comprising of an interdisciplinary team of medical professionals consisting of: a rehabilitation physician, registered nurse,  and/or , and a licensed/certified therapist from each therapy discipline involved in treating the patient.     Changes Since Pre-admission Assessment: None -This patient's participation in rehab continues to be reasonable, necessary and appropriate.    CMS Required Post-Admission Physician Evaluation Elements  History and Physical, including medical history, functional history and active comorbidities as in above text.     Post-Admission Physician Evaluation:  The patient has the potential to make improvement and is in need of physical, occupational, and/or therapy services. The patient may also need nutritional services. Given the patient's complex medical condition and risk of further medical complications, rehabilitative services cannot be safely  provided at a lower level of care, such as a skilled nursing facility. I have reviewed the patient's functional and medical status at the time of the preadmission screening and they are the same as on the day of this admission. I acknowledge that I have personally performed a full physical examination on this patient within 24 hours of admission. The patient and/or family demonstrated understanding the rehabilitation program and the discharge process after we discussed them.     Agree in entirety: yes  Minor adaptions: none    Major changes: none    Brandon Munoz, DO  Physical Medicine and Rehabilitation  [facility]    I have spent a total time of 85 minutes in caring for this patient on the day of the visit/encounter including Prognosis, Risks and benefits of tx options, Instructions for management, Importance of tx compliance, Counseling / Coordination of care, Documenting in the medical record, Reviewing / ordering tests, medicine, procedures  , Obtaining or reviewing history  , and Communicating with other healthcare professionals .      ** Please Note: Fluency Direct voice to text software may have been used in the creation of this document. **

## 2024-10-04 NOTE — DISCHARGE SUMMARY
Discharge Summary - Hospitalist   Name: Gold Van 78 y.o. male I MRN: [id number]  Unit/Bed#: -01 I Date of Admission: 9/29/2024   Date of Service: 10/4/2024 I Hospital Day: 5     Assessment & Plan  Acute respiratory failure with hypoxia (HCC)  POA  Arrived in suspected COPD exacerbation/pneumonia on CPAP, de-escalated to BiPAP, further de-escalated to nasal cannula oxygen  See plan for COPD with asthma    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HCC)  Suspected COPD exacerbation  Initially requiring CPAP, transitioned to BiPAP on arrival to the ED, successfully weaned to room air  Appreciate input of pulmonology who followed  Received IV Solu-Medrol, transitioned to oral prednisone on 10/2 and completed course today  Sputum C&S was positive for Pseudomonas-received cefepime while inpatient, transitioning to Vantin today for total of 2 more days (5 days course of treatment as recommended by pulmonology)   Sepsis due to pneumonia (HCC)  POA  Met sepsis criteria with tachycardia and leukocytosis (noted to be on chronic steroids)  Lactic acid 2.6-->1.3 without intervention   Blood cultures x 2-1/2 growing staph coagulase negative bacteria felt likely a skin contaminant  Chest x-ray noted mild opacity in left base atelectasis versus pneumonia  Received ceftriaxone and azithromycin for total of 4 days through 10/2  Initiated cefepime 10/2 given positive Pseudomonas on sputum C&S-addition to Vantin today for an additional 2 days  Appreciate input of pulmonology who followed  IV Solu-Medrol weaned to prednisone 10/2, discontinued today  Sepsis parameters resolved, medically stable for discharge  Discharging to [facility] as arranged by case management  Type 2 diabetes mellitus with complication, without long-term current use of insulin ([facility])  Lab Results   Component Value Date    HGBA1C 10.6 (H) 10/02/2024       Recent Labs     10/03/24  1617 10/03/24  2114 10/04/24  0731 10/04/24  1124   POCGLU 300* 222* 90 225*       Blood Sugar Average:  Last 72 hrs:  (P) [id number]    Target blood sugar inpatient 140-180   Optimized on current regimen  Continue prehospital diabetic regimen on discharge  Essential hypertension  Blood pressure controlled   Continue prehospital amlodipine, lisinopril, atenolol on discharge  COVID-19  Hospitalized 9/24/2024, received remdesivir and baricitinib  Pruritus  Received prednisone through today for COPD exacerbation  Resume home prednisone regimen on discharge  Dysmetria  Patient evaluated by therapy who noted problems with coordination with the right leg.  Unclear onset as patient's wife relayed that patient had deconditioning/altered gait at home where he was leaning on furniture to walk after his hospital discharge  Discussed with neurology, recommended stroke workup   See plan for CVA   CVA (cerebrovascular accident) (HCC)  CTA head and neck results reviewed  MRI brain: Multiple foci of acute infarcts involving bilateral frontoparietal cortices and subcortical white matter (left greater than right). No mass effect or hemorrhagic transformation. Old right centrum semiovale lacunar infarct. Mild chronic microangiopathic change. Acute or subacute sinus disease as described.  Appreciate input of neurology-continue DAPT and statin on discharge.  Outpatient follow-up with neurology in 6 weeks  CT chest abdomen pelvis without evidence of malignancy  TTE: LVEF 50%.  Did note mild hypokinesia of the posterior wall   Evaluated by cardiology-confirmed cardiology will arrange outpatient monitor and stress test  PT/OT input appreciated  Medically stable.  Discharging to [facility] today  Abnormal echocardiogram  Appreciate input of cardiology  TTE had posterior wall motion abnormalities  Cardiology planning outpatient monitor and stress test      Medical Problems       Resolved Problems  Date Reviewed: 10/4/2024   None       Discharging Physician / Practitioner: BELGICA Nelson  PCP: Shayne Diaz MD  Admission Date:    Admission Orders (From admission, onward)       Ordered        09/29/24 1154  INPATIENT ADMISSION  Once                          Discharge Date: 10/04/24    Consultations During Hospital Stay:  Neurology, pulmonology, cardiology    Procedures Performed:   10/1 TTE: LVEF 50% with normal systolic function.  Mild hypokinesis of the posterior wall.  Diastolic function mildly abnormal consistent with grade 1 relaxation; mild AVR    Significant Findings / Test Results:   9/29 chest x-ray: Mild opacity left lung base atelectasis or pneumonia  9/29 1 of 2 blood cultures Staph coagulase negative bacteria felt likely contaminant  10/1 sputum culture positive Pseudomonas  10/1 MRSA swab negative  10/1 CTA head and neck: Or subacute cortical infarct in the left precentral gyrus.  No intracranial hemorrhage or mass effect.  Chronic right centrum semiovale lacunar infarct.  Mild microangiopathic changes.  Acute or subacute sinusitis.  CT angiography noted severe multifocal atherosclerotic stenosis in the cavernous and supraclinoid segments of the bilateral internal carotid arteries, severe atherosclerotic stenosis in the pre-PICA segments of the bilateral intracranial vertebral arteries, moderate mid basilar artery stenosis, atherosclerotic stenosis of the cervical ICAs, 55% on the left and 50% on the right.  No significant stenosis of the cerebral vertebral arteries.  No apparent intracranial aneurysm.  10/1 MRI of the brain: Multiple foci of acute infarcts involving bilateral frontal parietal cortices and subcortical white matter left greater than right.  No mass effect or hemorrhagic transformation.  Old right centrum seminal ovale lacunar infarct.  Mild chronic microangiopathic changes.  Acute or subacute sinus disease.  10/2 CT CAP: She consolidation in the left greater than right posterior/basilar lobes differentials include scarring/atelectasis versus infectious infiltrate.  No evidence of malignancy in the chest abdomen  or pelvis.  Prostatomegaly and numerous bladder diverticula consistent with degree of outflow obstruction.  Correlate with PSA bilateral common femoral and proximal SFA severe calcific atherosclerotic disease likely proximal SFA occlusions    Incidental Findings:   See CTA CAP above    Test Results Pending at Discharge (will require follow up):   None     Outpatient Tests Requested:  None    Complications: None    Reason for Admission:   COPD with asthma exacerbation    Hospital Course:   For full details regarding patient's hospitalization please refer to the medical record.  In brief, Gold Van is a 78 y.o. male patient who originally presented to the hospital on 9/29/2024 due to shortness of breath.  He has a past medical history of COPD with asthma, recent COVID infection, essential hypertension, type 2 diabetes, chronic steroid use.  Recently hospitalized for COVID-19.  Presented to the ED via ambulance in respiratory distress on CPAP.  Did not prove on CPAP and was placed on BiPAP in the ED.  He was evaluated by critical care and deemed appropriate for admission to medicine.  He was initiated on IV cefepime for suspected pneumonia.  He was also initiated on IV steroids.  Met sepsis criteria on arrival with tachycardia and leukocytosis in setting of pneumonia.  On 10/1 he was noted to have problems with coordination of his right leg when working with PT.  Neurology evaluated patient and recommended stroke workup.  MRI of the brain did note multiple acute infarcts involving the bilateral frontal parietal cortices and subcortical white matter changes without evidence of mass effect or hemorrhagic transformation.  Patient was initiated on dual antiplatelet therapy and statin.  Patient had a CT chest abdomen pelvis at recommendation of neurology (due to hypercoagulability) to rule out possible malignancy which was ruled out.  Hypercoagulability likely related to recent COVID infection.  Patient was monitored  on telemetry, no events and no episodes of A-fib.  Cardiology evaluated patient as his TTE noted some normal wall motion in the posterior wall.  They are planning outpatient monitor and stress test.  Pulmonology also followed patient for management of his pneumonia.  He received cefepime and azithromycin discharged on additional 2 days of Vantin to cover the Pseudomonas that was present in his sputum as per pulmonology recommendations.  PT/OT recommend rehab placement on discharge.  Patient is medically stable today and will be discharged to [facility] as arranged by case management.  He will have outpatient follow-up with neurology in 6 weeks we will continue on DAPT on discharge as well as statin.           Please see above list of diagnoses and related plan for additional information.     Condition at Discharge: good    Discharge Day Visit / Exam:   Subjective: Denies any dizziness, lightheadedness, chest pain or palpitations.  Denies shortness of breath or cough.  Pleasant and talkative, verbalizing needs.  Wife at bedside.  Vitals: Blood Pressure: 142/70 (10/04/24 0723)  Pulse: 78 (10/04/24 0723)  Temperature: 97.6 °F (36.4 °C) (10/04/24 0723)  Temp Source: Oral (10/03/24 2200)  Respirations: 16 (10/04/24 0723)  Height: 6' (182.9 cm) (10/01/24 1036)  Weight - Scale: 71.7 kg (158 lb) (10/01/24 1036)  SpO2: 95 % (10/04/24 0723)  Physical Exam  Vitals and nursing note reviewed.   Constitutional:       General: He is not in acute distress.     Appearance: He is well-developed.   HENT:      Head: Normocephalic and atraumatic.      Mouth/Throat:      Mouth: Mucous membranes are moist.   Eyes:      Extraocular Movements: Extraocular movements intact.      Conjunctiva/sclera: Conjunctivae normal.      Pupils: Pupils are equal, round, and reactive to light.   Cardiovascular:      Rate and Rhythm: Normal rate and regular rhythm.      Pulses: Normal pulses.      Heart sounds: Normal heart sounds. No murmur heard.  Pulmonary:       Effort: Pulmonary effort is normal. No respiratory distress.      Breath sounds: Normal breath sounds. No wheezing or rales.   Abdominal:      General: Bowel sounds are normal. There is no distension.      Palpations: Abdomen is soft.      Tenderness: There is no abdominal tenderness.   Musculoskeletal:         General: No swelling.   Skin:     General: Skin is warm and dry.      Capillary Refill: Capillary refill takes less than 2 seconds.   Neurological:      General: No focal deficit present.      Mental Status: He is alert and oriented to person, place, and time. Mental status is at baseline.   Psychiatric:         Mood and Affect: Mood normal.         Behavior: Behavior normal.         Thought Content: Thought content normal.         Judgment: Judgment normal.          Discussion with Family: Updated  (wife) at bedside.    Discharge instructions/Information to patient and family:   See after visit summary for information provided to patient and family.      Provisions for Follow-Up Care:  See after visit summary for information related to follow-up care and any pertinent home health orders.      Mobility at time of Discharge:   Basic Mobility Inpatient Raw Score: 18  JH-HLM Goal: 6: Walk 10 steps or more  JH-HLM Achieved: 8: Walk 250 feet ot more  HLM Goal achieved. Continue to encourage appropriate mobility.     Disposition:   Acute Rehab at [facility]    Planned Readmission: no    Discharge Medications:  See after visit summary for reconciled discharge medications provided to patient and/or family.      Administrative Statements   Discharge Statement:  I have spent a total time of 40 minutes in caring for this patient on the day of the visit/encounter. >30 minutes of time was spent on: Diagnostic results, Patient and family education, Counseling / Coordination of care, Documenting in the medical record, and Communicating with other healthcare professionals .    **Please Note: This note may have  been constructed using a voice recognition system**

## 2024-10-04 NOTE — PHYSICAL THERAPY NOTE
PT Treatment Note    NAME:  Gold Van  DATE: 10/04/24    AGE:   78 y.o.  Mrn:   [id number]  ADMIT DX:  Shortness of breath [R06.02]  Pneumonia [J18.9]  COPD exacerbation (HCC) [J44.1]  Sepsis (HCC) [A41.9]  COVID-19 [U07.1]  Performed at least 2 patient identifiers during session: Name and Epic photo       10/04/24 5115   PT Last Visit   PT Visit Date 10/04/24   Note Type   Note Type Treatment   Pain Assessment   Pain Assessment Tool 0-10   Pain Score No Pain   Restrictions/Precautions   Weight Bearing Precautions Per Order No   Other Precautions Fall Risk   General   Chart Reviewed Yes   Cognition   Overall Cognitive Status WFL   Arousal/Participation Alert;Responsive;Cooperative   Attention Within functional limits   Orientation Level Oriented X4   Memory Within functional limits   Following Commands Follows all commands and directions without difficulty   Subjective   Subjective Im ready to get out of here   Balance   Static Sitting Good   Dynamic Sitting Fair +   Static Standing Fair +   Dynamic Standing Fair   Ambulatory Fair   Activity Tolerance   Activity Tolerance Patient tolerated treatment well   Nurse Made Aware RN [place]   Exercises   Squat Standing;15 reps   Assessment   Prognosis Good   Problem List Decreased strength;Decreased endurance;Impaired balance   Assessment Pt seen for PT treatment session this date with interventions consisting of gait training to normalize gait pattern to decrease fall risk and therapeutic exercise to improve strength to improve functional mobility. Pt agreeable to PT treatment session upon arrival, pt found supine in bed, in no apparent distress, A&O x 4, and responsive. Since previous session, pt has made good progress as evidenced by increased distance ambulated and increased activity tolerance  Barriers during this session include fatigue.  Pt continues to be functioning below baseline level, and remains limited 2* factors listed above and including decreased  strength, impaired  balance, and decreased coordination.  Pt prognosis for achieving goals is good, pending pt progress with hospitalization/medical status improvements, and indicated by motivated to participate in therapy and ability to follow cues. PT will continue to see pt during current hospitalization in order to address the deficits listed above and provide interventions consistent w/ POC in effort to achieve goals. Current goals and POC remain appropriate, pt continues to have rehab potential  Upon conclusion pt seated OOB in chair. The patient's [facility] Basic Mobility Inpatient Short Form Raw Score is 18.  Based on patient presentations and impairments, pt would most appropriately benefit from Level 1 resource intensity upon discharge.  Please also refer to the recommendation of the Physical Therapist for safe discharge planning. RN verbalized pt appropriate for PT session.   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   LTG Expiration Date 10/11/24   PT Treatment Day 3   Plan   Treatment/Interventions Functional transfer training;LE strengthening/ROM;Elevations;Therapeutic exercise;Endurance training;Gait training   Progress Progressing toward goals   PT Frequency 3-5x/wk   Discharge Recommendation   Rehab Resource Intensity Level, PT I (Maximum Resource Intensity)   Equipment Recommended Walker   [facility]   Turning in Flat Bed Without Bedrails 3   Lying on Back to Sitting on Edge of Flat Bed Without Bedrails 3   Moving Bed to Chair 3   Standing Up From Chair Using Arms 3   Walk in Room 3   Climb 3-5 Stairs With Railing 3   Basic Mobility Inpatient Raw Score 18   Basic Mobility Standardized Score 41.05   [facility] Highest Level Of Mobility   -HLM Goal 6: Walk 10 steps or more   -HLM Achieved 8: Walk 250 feet ot more         Time In: 0912  Time Out: 0936  Total Treatment Minutes: 24    Domenico Robb, PT

## 2024-10-04 NOTE — ASSESSMENT & PLAN NOTE
CTA head and neck results reviewed  MRI brain: Multiple foci of acute infarcts involving bilateral frontoparietal cortices and subcortical white matter (left greater than right). No mass effect or hemorrhagic transformation. Old right centrum semiovale lacunar infarct. Mild chronic microangiopathic change. Acute or subacute sinus disease as described.  Appreciate input of neurology-continue DAPT and statin on discharge.  Outpatient follow-up with neurology in 6 weeks  CT chest abdomen pelvis without evidence of malignancy  TTE: LVEF 50%.  Did note mild hypokinesia of the posterior wall   Evaluated by cardiology-confirmed cardiology will arrange outpatient monitor and stress test  PT/OT input appreciated  Medically stable.  Discharging to [facility] today

## 2024-10-04 NOTE — ASSESSMENT & PLAN NOTE
Atenolol 25 mg daily as well as amlodipine 5 mg twice daily and lisinopril 20 mg twice daily  Management per internal medicine and monitor blood pressures and therapy and on routine vitals

## 2024-10-04 NOTE — ASSESSMENT & PLAN NOTE
POA  Met sepsis criteria with tachycardia and leukocytosis (noted to be on chronic steroids)  Lactic acid 2.6-->1.3 without intervention   Blood cultures x 2-1/2 growing staph coagulase negative bacteria felt likely a skin contaminant  Chest x-ray noted mild opacity in left base atelectasis versus pneumonia  Received ceftriaxone and azithromycin for total of 4 days through 10/2  Initiated cefepime 10/2 given positive Pseudomonas on sputum C&S-addition to Vantin today for an additional 2 days  Appreciate input of pulmonology who followed  IV Solu-Medrol weaned to prednisone 10/2, discontinued today  Sepsis parameters resolved, medically stable for discharge  Discharging to [facility] as arranged by case management

## 2024-10-04 NOTE — PLAN OF CARE
Problem: Potential for Falls  Goal: Patient will remain free of falls  Description: INTERVENTIONS:  - Educate patient/family on patient safety including physical limitations  - Instruct patient to call for assistance with activity   - Consult OT/PT to assist with strengthening/mobility   - Keep Call bell within reach  - Keep bed low and locked with side rails adjusted as appropriate  - Keep care items and personal belongings within reach  - Initiate and maintain comfort rounds  - Make Fall Risk Sign visible to staff  - Offer Toileting every *** Hours, in advance of need  - Initiate/Maintain ***alarm  - Obtain necessary fall risk management equipment: ***  - Apply yellow socks and bracelet for high fall risk patients  - Consider moving patient to room near nurses station  Outcome: Progressing     Problem: PAIN - ADULT  Goal: Verbalizes/displays adequate comfort level or baseline comfort level  Description: Interventions:  - Encourage patient to monitor pain and request assistance  - Assess pain using appropriate pain scale  - Administer analgesics based on type and severity of pain and evaluate response  - Implement non-pharmacological measures as appropriate and evaluate response  - Consider cultural and social influences on pain and pain management  - Notify physician/advanced practitioner if interventions unsuccessful or patient reports new pain  Outcome: Progressing     Problem: INFECTION - ADULT  Goal: Absence or prevention of progression during hospitalization  Description: INTERVENTIONS:  - Assess and monitor for signs and symptoms of infection  - Monitor lab/diagnostic results  - Monitor all insertion sites, i.e. indwelling lines, tubes, and drains  - Monitor endotracheal if appropriate and nasal secretions for changes in amount and color  - [place] appropriate cooling/warming therapies per order  - Administer medications as ordered  - Instruct and encourage patient and family to use good hand hygiene  technique  - Identify and instruct in appropriate isolation precautions for identified infection/condition  Outcome: Progressing  Goal: Absence of fever/infection during neutropenic period  Description: INTERVENTIONS:  - Monitor WBC    Outcome: Progressing     Problem: SAFETY ADULT  Goal: Patient will remain free of falls  Description: INTERVENTIONS:  - Educate patient/family on patient safety including physical limitations  - Instruct patient to call for assistance with activity   - Consult OT/PT to assist with strengthening/mobility   - Keep Call bell within reach  - Keep bed low and locked with side rails adjusted as appropriate  - Keep care items and personal belongings within reach  - Initiate and maintain comfort rounds  - Make Fall Risk Sign visible to staff  - Offer Toileting every *** Hours, in advance of need  - Initiate/Maintain ***alarm  - Obtain necessary fall risk management equipment: ***  - Apply yellow socks and bracelet for high fall risk patients  - Consider moving patient to room near nurses station  Outcome: Progressing  Goal: Maintain or return to baseline ADL function  Description: INTERVENTIONS:  -  Assess patient's ability to carry out ADLs; assess patient's baseline for ADL function and identify physical deficits which impact ability to perform ADLs (bathing, care of mouth/teeth, toileting, grooming, dressing, etc.)  - Assess/evaluate cause of self-care deficits   - Assess range of motion  - Assess patient's mobility; develop plan if impaired  - Assess patient's need for assistive devices and provide as appropriate  - Encourage maximum independence but intervene and supervise when necessary  - Involve family in performance of ADLs  - Assess for home care needs following discharge   - Consider OT consult to assist with ADL evaluation and planning for discharge  - Provide patient education as appropriate  Outcome: Progressing  Goal: Maintains/Returns to pre admission functional  level  Description: INTERVENTIONS:  - Perform AM-PAC 6 Click Basic Mobility/ Daily Activity assessment daily.  - Set and communicate daily mobility goal to care team and patient/family/caregiver.   - Collaborate with rehabilitation services on mobility goals if consulted  - Perform Range of Motion *** times a day.  - Reposition patient every *** hours.  - Dangle patient *** times a day  - Stand patient *** times a day  - Ambulate patient *** times a day  - Out of bed to chair *** times a day   - Out of bed for meals *** times a day  - Out of bed for toileting  - Record patient progress and toleration of activity level   Outcome: Progressing     Problem: DISCHARGE PLANNING  Goal: Discharge to home or other facility with appropriate resources  Description: INTERVENTIONS:  - Identify barriers to discharge w/patient and caregiver  - Arrange for needed discharge resources and transportation as appropriate  - Identify discharge learning needs (meds, wound care, etc.)  - Arrange for interpretive services to assist at discharge as needed  - Refer to Case Management Department for coordinating discharge planning if the patient needs post-hospital services based on physician/advanced practitioner order or complex needs related to functional status, cognitive ability, or social support system  Outcome: Progressing     Problem: Knowledge Deficit  Goal: Patient/family/caregiver demonstrates understanding of disease process, treatment plan, medications, and discharge instructions  Description: Complete learning assessment and assess knowledge base.  Interventions:  - Provide teaching at level of understanding  - Provide teaching via preferred learning methods  Outcome: Progressing     Problem: Nutrition/Hydration-ADULT  Goal: Nutrient/Hydration intake appropriate for improving, restoring or maintaining nutritional needs  Description: Monitor and assess patient's nutrition/hydration status for malnutrition. Collaborate with  interdisciplinary team and initiate plan and interventions as ordered.  Monitor patient's weight and dietary intake as ordered or per policy. Utilize nutrition screening tool and intervene as necessary. Determine patient's food preferences and provide high-protein, high-caloric foods as appropriate.     INTERVENTIONS:  - Monitor oral intake, urinary output, labs, and treatment plans  - Assess nutrition and hydration status and recommend course of action  - Evaluate amount of meals eaten  - Assist patient with eating if necessary   - Allow adequate time for meals  - Recommend/ encourage appropriate diets, oral nutritional supplements, and vitamin/mineral supplements  - Order, calculate, and assess calorie counts as needed  - Recommend, monitor, and adjust tube feedings and TPN/PPN based on assessed needs  - Assess need for intravenous fluids  - Provide specific nutrition/hydration education as appropriate  - Include patient/family/caregiver in decisions related to nutrition  Outcome: Progressing     Problem: Neurological Deficit  Goal: Neurological status is stable or improving  Description: Interventions:  - Monitor and assess patient's level of consciousness, motor function, sensory function, and level of assistance needed for ADLs.   - Monitor and report changes from baseline. Collaborate with interdisciplinary team to initiate plan and implement interventions as ordered.   - Provide and maintain a safe environment.  - Consider seizure precautions.  - Consider fall precautions.  - Consider aspiration precautions.  - Consider bleeding precautions.  Outcome: Progressing     Problem: Activity Intolerance/Impaired Mobility  Goal: Mobility/activity is maintained at optimum level for patient  Description: Interventions:  - Assess and monitor patient  barriers to mobility and need for assistive/adaptive devices.  - Assess patient's emotional response to limitations.  - Collaborate with interdisciplinary team and initiate  plans and interventions as ordered.  - Encourage independent activity per ability.  - Maintain proper body alignment.  - Perform active/passive rom as tolerated/ordered.  - Plan activities to conserve energy.  - Turn patient as appropriate  Outcome: Progressing     Problem: Communication Impairment  Goal: Ability to express needs and understand communication  Description: Assess patient's communication skills and ability to understand information.  Patient will demonstrate use of effective communication techniques, alternative methods of communication and understanding even if not able to speak.     - Encourage communication and provide alternate methods of communication as needed.  - Collaborate with case management/ for discharge needs.  - Include patient/family/caregiver in decisions related to communication.  Outcome: Progressing     Problem: Potential for Aspiration  Goal: Non-ventilated patient's risk of aspiration is minimized  Description: Assess and monitor vital signs, respiratory status, and labs (WBC).  Monitor for signs of aspiration (tachypnea, cough, rales, wheezing, cyanosis, fever).    - Assess and monitor patient's ability to swallow.  - Place patient up in chair to eat if possible.  - HOB up at 90 degrees to eat if unable to get patient up into chair.  - Supervise patient during oral intake.   - Instruct patient/ family to take small bites.  - Instruct patient/ family to take small single sips when taking liquids.  - Follow patient-specific strategies generated by speech pathologist.  Outcome: Progressing  Goal: Ventilated patient's risk of aspiration is minimized  Description: Assess and monitor vital signs, respiratory status, airway cuff pressure, and labs (WBC).  Monitor for signs of aspiration (tachypnea, cough, rales, wheezing, cyanosis, fever).    - Elevate head of bed 30 degrees if patient has tube feeding.  - Monitor tube feeding.  Outcome: Progressing     Problem:  Nutrition  Goal: Nutrition/Hydration status is improving  Description: Monitor and assess patient's nutrition/hydration status for malnutrition (ex- brittle hair, bruises, dry skin, pale skin and conjunctiva, muscle wasting, smooth red tongue, and disorientation). Collaborate with interdisciplinary team and initiate plan and interventions as ordered.  Monitor patient's weight and dietary intake as ordered or per policy. Utilize nutrition screening tool and intervene per policy. Determine patient's food preferences and provide high-protein, high-caloric foods as appropriate.     - Assist patient with eating.  - Allow adequate time for meals.  - Encourage patient to take dietary supplement as ordered.  - Collaborate with clinical nutritionist.  - Include patient/family/caregiver in decisions related to nutrition.  Outcome: Progressing

## 2024-10-04 NOTE — DISCHARGE INSTR - AVS FIRST PAGE
Please follow-up with your primary care physician after discharge from [facility]  [facility] will be calling you to schedule follow-up with a stress test an ambulatory monitor after discharge from [facility]-I made them aware you are being discharged there today  Please follow-up with neurology within 6 weeks of discharge.  Will continue on dual antiplatelet therapy with aspirin and Plavix for total of 90 days followed by aspirin monotherapy as recommended by neurology.  You will also continue your statin on discharge          It has been a pleasure taking care of you Mr. Van.  I wish you all the best on discharge!  BELGICA Vidales

## 2024-10-04 NOTE — ASSESSMENT & PLAN NOTE
Suspected COPD exacerbation  Initially requiring CPAP, transitioned to BiPAP on arrival to the ED, successfully weaned to room air  Appreciate input of pulmonology who followed  Received IV Solu-Medrol, transitioned to oral prednisone on 10/2 and completed course today  Sputum C&S was positive for Pseudomonas-received cefepime while inpatient, transitioning to Vantin today for total of 2 more days (5 days course of treatment as recommended by pulmonology)

## 2024-10-04 NOTE — ASSESSMENT & PLAN NOTE
Patient presents with hyponatremia sodium of 134 as of 10/3  Continue to monitor on biweekly labs or sooner if clinically indicated

## 2024-10-04 NOTE — PLAN OF CARE
Problem: Potential for Falls  Goal: Patient will remain free of falls  Description: INTERVENTIONS:  - Educate patient/family on patient safety including physical limitations  - Instruct patient to call for assistance with activity   - Consult OT/PT to assist with strengthening/mobility   - Keep Call bell within reach  - Keep bed low and locked with side rails adjusted as appropriate  - Keep care items and personal belongings within reach  - Initiate and maintain comfort rounds  - Make Fall Risk Sign visible to staff  - Offer Toileting every 2 Hours, in advance of need  - Initiate/Maintain bed alarm  - Obtain necessary fall risk management equipment: bed alarm  - Apply yellow socks and bracelet for high fall risk patients  - Consider moving patient to room near nurses station  Outcome: Progressing     Problem: PAIN - ADULT  Goal: Verbalizes/displays adequate comfort level or baseline comfort level  Description: Interventions:  - Encourage patient to monitor pain and request assistance  - Assess pain using appropriate pain scale  - Administer analgesics based on type and severity of pain and evaluate response  - Implement non-pharmacological measures as appropriate and evaluate response  - Consider cultural and social influences on pain and pain management  - Notify physician/advanced practitioner if interventions unsuccessful or patient reports new pain  Outcome: Progressing     Problem: INFECTION - ADULT  Goal: Absence or prevention of progression during hospitalization  Description: INTERVENTIONS:  - Assess and monitor for signs and symptoms of infection  - Monitor lab/diagnostic results  - Monitor all insertion sites, i.e. indwelling lines, tubes, and drains  - Monitor endotracheal if appropriate and nasal secretions for changes in amount and color  - [place] appropriate cooling/warming therapies per order  - Administer medications as ordered  - Instruct and encourage patient and family to use good hand  hygiene technique  - Identify and instruct in appropriate isolation precautions for identified infection/condition  Outcome: Progressing  Goal: Absence of fever/infection during neutropenic period  Description: INTERVENTIONS:  - Monitor WBC    Outcome: Progressing     Problem: SAFETY ADULT  Goal: Patient will remain free of falls  Description: INTERVENTIONS:  - Educate patient/family on patient safety including physical limitations  - Instruct patient to call for assistance with activity   - Consult OT/PT to assist with strengthening/mobility   - Keep Call bell within reach  - Keep bed low and locked with side rails adjusted as appropriate  - Keep care items and personal belongings within reach  - Initiate and maintain comfort rounds  - Make Fall Risk Sign visible to staff  - Offer Toileting every 2 Hours, in advance of need  - Initiate/Maintain bed alarm  - Obtain necessary fall risk management equipment: bed alarm  - Apply yellow socks and bracelet for high fall risk patients  - Consider moving patient to room near nurses station  Outcome: Progressing  Goal: Maintain or return to baseline ADL function  Description: INTERVENTIONS:  - Educate patient/family on patient safety including physical limitations  - Instruct patient to call for assistance with activity   - Consult OT/PT to assist with strengthening/mobility   - Keep Call bell within reach  - Keep bed low and locked with side rails adjusted as appropriate  - Keep care items and personal belongings within reach  - Initiate and maintain comfort rounds  - Make Fall Risk Sign visible to staff  - Offer Toileting every 2 Hours, in advance of need  - Initiate/Maintain bed alarm  - Obtain necessary fall risk management equipment:  bed alarm   - Apply yellow socks and bracelet for high fall risk patients  - Consider moving patient to room near nurses station  Outcome: Progressing  Goal: Maintains/Returns to pre admission functional level  Description:  INTERVENTIONS:  - Perform AM-PAC 6 Click Basic Mobility/ Daily Activity assessment daily.  - Set and communicate daily mobility goal to care team and patient/family/caregiver.   - Collaborate with rehabilitation services on mobility goals if consulted  - Perform Range of Motion 3 times a day.  - Reposition patient every 2 hours.  - Dangle patient 3 times a day  - Stand patient 3 times a day  - Ambulate patient 3 times a day  - Out of bed to chair 3 times a day   - Out of bed for meals 3 times a day  - Out of bed for toileting  - Record patient progress and toleration of activity level   Outcome: Progressing     Problem: DISCHARGE PLANNING  Goal: Discharge to home or other facility with appropriate resources  Description: INTERVENTIONS:  - Identify barriers to discharge w/patient and caregiver  - Arrange for needed discharge resources and transportation as appropriate  - Identify discharge learning needs (meds, wound care, etc.)  - Refer to Case Management Department for coordinating discharge planning if the patient needs post-hospital services based on physician/advanced practitioner order or complex needs related to functional status, cognitive ability, or social support system  Outcome: Progressing     Problem: Knowledge Deficit  Goal: Patient/family/caregiver demonstrates understanding of disease process, treatment plan, medications, and discharge instructions  Description: Complete learning assessment and assess knowledge base.  Interventions:  - Provide teaching at level of understanding  - Provide teaching via preferred learning methods  Outcome: Progressing     Problem: Nutrition/Hydration-ADULT  Goal: Nutrient/Hydration intake appropriate for improving, restoring or maintaining nutritional needs  Description: Monitor and assess patient's nutrition/hydration status for malnutrition. Collaborate with interdisciplinary team and initiate plan and interventions as ordered.  Monitor patient's weight and dietary  intake as ordered or per policy. Utilize nutrition screening tool and intervene as necessary. Determine patient's food preferences and provide high-protein, high-caloric foods as appropriate.     INTERVENTIONS:  - Monitor oral intake, urinary output, labs, and treatment plans  - Assess nutrition and hydration status and recommend course of action  - Evaluate amount of meals eaten  - Assist patient with eating if necessary   - Allow adequate time for meals  - Recommend/ encourage appropriate diets, oral nutritional supplements, and vitamin/mineral supplements  - Order, calculate, and assess calorie counts as needed  - Recommend, monitor, and adjust tube feedings and TPN/PPN based on assessed needs  - Assess need for intravenous fluids  - Provide specific nutrition/hydration education as appropriate  - Include patient/family/caregiver in decisions related to nutrition  Outcome: Progressing     Problem: Neurological Deficit  Goal: Neurological status is stable or improving  Description: Interventions:  - Monitor and assess patient's level of consciousness, motor function, sensory function, and level of assistance needed for ADLs.   - Monitor and report changes from baseline. Collaborate with interdisciplinary team to initiate plan and implement interventions as ordered.   - Provide and maintain a safe environment.  - Consider seizure precautions.  - Consider fall precautions.  - Consider aspiration precautions.  - Consider bleeding precautions.  Outcome: Progressing     Problem: Activity Intolerance/Impaired Mobility  Goal: Mobility/activity is maintained at optimum level for patient  Description: Interventions:  - Assess and monitor patient  barriers to mobility and need for assistive/adaptive devices.  - Assess patient's emotional response to limitations.  - Collaborate with interdisciplinary team and initiate plans and interventions as ordered.  - Encourage independent activity per ability.  - Maintain proper body  alignment.  - Perform active/passive rom as tolerated/ordered.  - Plan activities to conserve energy.  - Turn patient as appropriate  Outcome: Progressing     Problem: Communication Impairment  Goal: Ability to express needs and understand communication  Description: Assess patient's communication skills and ability to understand information.  Patient will demonstrate use of effective communication techniques, alternative methods of communication and understanding even if not able to speak.     - Encourage communication and provide alternate methods of communication as needed.  - Collaborate with case management/ for discharge needs.  - Include patient/family/caregiver in decisions related to communication.  Outcome: Progressing     Problem: Potential for Aspiration  Goal: Non-ventilated patient's risk of aspiration is minimized  Description: Assess and monitor vital signs, respiratory status, and labs (WBC).  Monitor for signs of aspiration (tachypnea, cough, rales, wheezing, cyanosis, fever).    - Assess and monitor patient's ability to swallow.  - Place patient up in chair to eat if possible.  - HOB up at 90 degrees to eat if unable to get patient up into chair.  - Supervise patient during oral intake.   - Instruct patient/ family to take small bites.  - Instruct patient/ family to take small single sips when taking liquids.  - Follow patient-specific strategies generated by speech pathologist.  Outcome: Progressing  Goal: Ventilated patient's risk of aspiration is minimized  Description: Assess and monitor vital signs, respiratory status, airway cuff pressure, and labs (WBC).  Monitor for signs of aspiration (tachypnea, cough, rales, wheezing, cyanosis, fever).    - Elevate head of bed 30 degrees if patient has tube feeding.  - Monitor tube feeding.  Outcome: Progressing     Problem: Nutrition  Goal: Nutrition/Hydration status is improving  Description: Monitor and assess patient's  nutrition/hydration status for malnutrition (ex- brittle hair, bruises, dry skin, pale skin and conjunctiva, muscle wasting, smooth red tongue, and disorientation). Collaborate with interdisciplinary team and initiate plan and interventions as ordered.  Monitor patient's weight and dietary intake as ordered or per policy. Utilize nutrition screening tool and intervene per policy. Determine patient's food preferences and provide high-protein, high-caloric foods as appropriate.     - Assist patient with eating.  - Allow adequate time for meals.  - Encourage patient to take dietary supplement as ordered.  - Collaborate with clinical nutritionist.  - Include patient/family/caregiver in decisions related to nutrition.  Outcome: Progressing

## 2024-10-04 NOTE — ASSESSMENT & PLAN NOTE
Received prednisone through today for COPD exacerbation  Resume home prednisone regimen on discharge

## 2024-10-04 NOTE — PROGRESS NOTES
PHYSICAL MEDICINE AND REHABILITATION   PREADMISSION ASSESSMENT     Projected IGC and Rehabilitation Diagnoses:  Impairment of mobility, safety and Activities of Daily Living (ADLs) due to Stroke:  01.9 Other Stroke  Etiologic: Multiple foci of acute infarcts involving bilateral frontoparietal cortices and subcortical white matter (left greater than right)  Date of Onset: 9/29/24       PATIENT INFORMATION  Name: Gold Van Phone #: [phone] (home)   Address: [address]  [place] [address]  YOB: 1945 Age: 78 y.o. SS#   Marital Status: /Civil Union  Ethnicity: White  Employment Status: retired  Extended Emergency Contact Information  Primary Emergency Contact: Mya Van  Address: [address]           [place] [address] [facility]  Home Phone: [phone]  Mobile Phone: [phone]  Relation: Spouse  Advance Directive: Level 1 Full Code (no ACP docs)    INSURANCE/COVERAGE:     Primary Payor: [employer]  REP / Plan: [employer]  REP / Product Type: Medicare PPO /   Secondary Payer:Self Pay    Payer Contact:  Payer Contact:   Contact Phone:  Contact Phone:     [facility] has received APPROVED authorization.  Insurance: [employer]   Called in by Rep: Stacia WADDELL#: [phone]  Authorization received for: Acute Rehab  Facility: [facility]   Authorization #: [id number]   Start of Care: 10/03  Next Review Date: 2 Business Days   Submit next review to: [facility] / F#: [phone]  MEDICARE #: [id number]   Medical Record #: [id number]    REFERRAL SOURCE:   Referring provider: Binh Hurd DO  Referring facility: [facility]  Room: /-01  PCP: Shayne Diaz MD PCP phone number: [phone]    MEDICAL INFORMATION  HPI: : Pt is a 78 y.o. male with HTN, HLD, DM, and COPD who initially presented to [facility] on 9/29 and was admitted for acute respiratory  failure with hypoxia due to COPD exacerbation in the setting of recent COVID infection. Pt hospitalized with Covid infection 9/24-9/26/2024.  There was concern for pneumonia exacerbating his respiratory failure. Pt met sepsis criteria with tachycardia and leukocytosis (noted to be on chronic steroids). Imaging with new left lingular markings. Started on ceftriaxone and azithromycin in the ER as well as steroids for COPD. He initially required CPAP, then BiPAP, and then was able to be weaned to nasal cannula. Pt is currently on RA. Pulmonology recommendations appreciated.  Continue azithromycin/ceftriaxone to complete 7 days. Continue 3% sodium chloride/Xopenex via nebulizer tid.      PT worked with the patient morning of 10/1 and noted that he seemed less coordinated on the right side compared to his left. Pt himself began to complain that he felt he could not use his right arm as much as his left yesterday but also felt that he may have slept on it. He has been deconditioned from recent admission for COVID and now readmission. Imaging showed multifocal infarcts bilaterally and multifocal intracranial atherosclerotic disease. At least some of these infarcts appear watershed appearance and has severe stenosis in both intracranial ICAs.  This is likely due to episodes of hypotension or hypoperfusion he may have had during his illness.  Other infarcts appear embolic although unclear etiology.  Obtain CT chest abdomen pelvis to evaluate for occult malignancy. CT chest abdomen pelvis without evidence of malignancy. TTE: LVEF 50%.  Did note mild hypokinesia of the posterior wall. Evaluated by cardiology planning outpatient event monitor and stress test.     PT and OT have been consulted and are recommending post-acute rehab services.   Patient's case has been reviewed with [facility] medical director, patient meets medical criteria for acute rehab and has demonstrated the ability to tolerate three or more hours of therapy per  day. Patient is medically stable and ready for discharge to [facility].      Past Medical History:   Past Surgical History:   Allergies:     Past Medical History:   Diagnosis Date    Asthma     Diabetes mellitus (HCC)     Environmental allergies     Hyperlipidemia     Hypertension     Macular degeneration 07/13/2020    right eye    Orthostatic hypotension     Osteopenia     Pneumothorax     Sinusitis     Past Surgical History:   Procedure Laterality Date    COLONOSCOPY      KNEE CARTILAGE SURGERY Right 1964    VASECTOMY      WISDOM TOOTH EXTRACTION      x4     Allergies   Allergen Reactions    Ciprofloxacin Shortness Of Breath    Sulfamethoxazole Shortness Of Breath    Trimethoprim Shortness Of Breath    Dexamethasone Other (See Comments)    Triamcinolone Other (See Comments)    Bactrim [Sulfamethoxazole-Trimethoprim] Fever     Fever of 107         Medical/functional conditions requiring inpatient rehabilitation: CVA, Debility from COVID and acute respiratory failure, impaired self care and mobility, decreased strength/endurance    Risk for medical/clinical complications: Risk for falls, Risk for skin breakdown secondary to decreased mobility, Risk for extension or additional infarcts, Risk for acute respiratory failure, Risk for hypertensive episodes    Comorbidities:   Acute respiratory failure with hypoxia   COPD with asthma  Sepsis due to pneumonia  DM2  HTN  Pruritus  Hx COVID  Dysmetria    Surgeries in the last 100 days: n/a    CURRENT VITAL SIGNS:   Temp:  [97.6 °F (36.4 °C)-97.8 °F (36.6 °C)] 97.6 °F (36.4 °C)  HR:  [60-78] 78  BP: (128-143)/(65-72) 142/70  Resp:  [16-18] 16  SpO2:  [91 %-95 %] 95 %  O2 Device: None (Room air)   Intake/Output Summary (Last 24 hours) at 10/4/2024 1033  Last data filed at 10/4/2024 0846  Gross per 24 hour   Intake 830 ml   Output 850 ml   Net -20 ml        LABORATORY RESULTS:      Lab Results   Component Value Date    HGB 12.0 10/03/2024    HGB 13.4 03/06/2014    HCT 36.5 10/03/2024     HCT 38.0 03/06/2014    WBC 9.70 10/03/2024    WBC 7.04 03/06/2014     Lab Results   Component Value Date    BUN 21 10/03/2024    BUN 10 04/14/2015     04/14/2015    K 4.0 10/03/2024    K 4.5 04/14/2015     10/03/2024     04/14/2015    GLUCOSE 179 (H) 04/14/2015    CREATININE 0.91 10/03/2024    CREATININE 0.86 04/14/2015     Lab Results   Component Value Date    PROTIME 13.8 09/29/2024    INR 1.01 09/29/2024        DIAGNOSTIC STUDIES:  MRI brain wo contrast    Result Date: 10/1/2024  Impression: 1.  Multiple foci of acute infarcts involving bilateral frontoparietal cortices and subcortical white matter (left greater than right). No mass effect or hemorrhagic transformation. 2.  Old right centrum semiovale lacunar infarct. Mild chronic microangiopathic change. 3.  Acute or subacute sinus disease as described. Workstation performed: [id number]     CTA head and neck w wo contrast    Result Date: 10/1/2024  Impression: CT Brain: 1.  Findings this for acute or subacute cortical infarct in the left precentral gyrus. No intracranial hemorrhage or mass effect. 2.  Chronic right centrum semiovale lacunar infarct. Mild microangiopathic change. 3.  Acute or subacute sinusitis as described. CT Angiography: 4.  Severe multifocal atherosclerotic stenosis in the cavernous and supraclinoid segments of bilateral internal carotid arteries. 5.  Severe atherosclerotic stenosis in pre-PICA segments of bilateral intracranial vertebral arteries. 6.  Focal moderate mid basilar artery stenosis. 7.  Atherosclerotic stenosis of the cervical ICAs, about 55% on the left and 50% on the right. No significant stenosis in the cervical vertebral arteries. 8.  No apparent intracranial aneurysm. I personally discussed this study with NIKO CARTER on 10/1/2024 12:35 PM. Workstation performed: [id number]     XR chest 1 view portable    Result Date: 9/29/2024  Impression: Mild opacity in the left base which could be due to  atelectasis or pneumonia. Workstation performed: [id number]       PRECAUTIONS/SPECIAL NEEDS:  Anticoagulation:  aspirin 81 mg orally every day and clopidogrel 75 mg orally every day, Blood Sugar Management: Per MD orders, Edema Management, Safety Concerns, Dietary Restrictions: Regan/CHO controlled , and Language Preference: English, Fall precautions    MEDICATIONS:     Current Facility-Administered Medications:     acetaminophen (TYLENOL) tablet 650 mg, 650 mg, Oral, Q6H PRN, BELGICA Thompson    albuterol (PROVENTIL HFA,VENTOLIN HFA) inhaler 2 puff, 2 puff, Inhalation, Q4H PRN, Binh Hurd, DO    albuterol inhalation solution 2.5 mg, 2.5 mg, Nebulization, Q4H PRN, Binh Hurd, DO, 2.5 mg at 09/29/24 1446    amLODIPine (NORVASC) tablet 5 mg, 5 mg, Oral, BID, BELGICA Thompson, 5 mg at 10/04/24 0858    aspirin chewable tablet 81 mg, 81 mg, Oral, Daily, LUIS ALBERTO ThompsonNP, 81 mg at 10/04/24 0858    atenolol (TENORMIN) tablet 25 mg, 25 mg, Oral, Daily, BELGICA Thompson, 25 mg at 10/04/24 0858    atorvastatin (LIPITOR) tablet 40 mg, 40 mg, Oral, QPM, BELGICA Thompson, 40 mg at 10/03/24 1816    benzonatate (TESSALON PERLES) capsule 100 mg, 100 mg, Oral, TID PRN, BELGICA Thompson    cefepime (MAXIPIME) IVPB (premix in dextrose) 2,000 mg 50 mL, 2,000 mg, Intravenous, Q12H, BELGICA Nelson, Last Rate: 100 mL/hr at 10/04/24 0450, 2,000 mg at 10/04/24 0450    clopidogrel (PLAVIX) tablet 75 mg, 75 mg, Oral, Daily, BELGICA Thompson, 75 mg at 10/04/24 0858    enoxaparin (LOVENOX) subcutaneous injection 40 mg, 40 mg, Subcutaneous, Daily, BELGICA Thompson, 40 mg at 10/04/24 0858    famotidine (PEPCID) tablet 20 mg, 20 mg, Oral, BID, BELGICA Thompson, 20 mg at 10/04/24 0858    guaiFENesin (MUCINEX) 12 hr tablet 600 mg, 600 mg, Oral, BID, BELGICA Thompson, 600 mg at 10/04/24 0858    insulin glargine (LANTUS) subcutaneous injection 15 Units 0.15 mL, 15 Units, Subcutaneous,  HS, BELGICA Nelson, 15 Units at 10/03/24 2159    insulin lispro (HumALOG/ADMELOG) 100 units/mL subcutaneous injection 2-12 Units, 2-12 Units, Subcutaneous, TID AC, 8 Units at 10/03/24 1632 **AND** Fingerstick Glucose (POCT), , , TID AC, Molly Anderson, BELGICA    insulin lispro (HumALOG/ADMELOG) 100 units/mL subcutaneous injection 2-12 Units, 2-12 Units, Subcutaneous, HS, BELGICA Thompson, 4 Units at 10/03/24 2159    levalbuterol (XOPENEX) inhalation solution 1.25 mg, 1.25 mg, Nebulization, TID, Shakir Woods MD, 1.25 mg at 10/04/24 0847    lisinopril (ZESTRIL) tablet 20 mg, 20 mg, Oral, BID, BELGICA Thompson, 20 mg at 10/04/24 0858    LORazepam (ATIVAN) tablet 0.5 mg, 0.5 mg, Oral, HS PRN, BELGICA Thompson    montelukast (SINGULAIR) tablet 10 mg, 10 mg, Oral, HS, BELGICA Thompson, 10 mg at 10/03/24 2158    sodium chloride 3 % inhalation solution 4 mL, 4 mL, Nebulization, TID, Shakir Woods MD, 4 mL at 10/04/24 0847    SKIN INTEGRITY:   Wound 09/29/24 Pressure Injury Coccyx    PRIOR LEVEL OF FUNCTION:  He lives in a(n) single family home  Gold Van is  and lives with their spouse.  Self Care: Independent, Indoor Mobility: Independent, Stairs (in/outdoor): Independent, and Cognition: Independent    FALLS IN THE LAST 6 MONTHS: 0    HOME ENVIRONMENT:  The living area: can live on one level  There are Greater than 10 steps to enter the home.    The patient will not have 24 hour supervision/physical assistance available upon discharge.    PREVIOUS DME:  Equipment in home (previous DME): Grab Bars, Rolling Walker, and walking stick    FUNCTIONAL STATUS:  Physical Therapy Occupational Therapy Speech Therapy   10/02/24 1410    PT Last Visit   PT Visit Date 10/02/24   Note Type   Note Type Treatment   Pain Assessment   Pain Assessment Tool 0-10   Pain Score No Pain   Restrictions/Precautions   Weight Bearing Precautions Per Order No   General   Chart  "Reviewed Yes   Cognition   Overall Cognitive Status WFL   Arousal/Participation Alert;Responsive   Attention Within functional limits   Orientation Level Oriented X4   Memory Within functional limits   Following Commands Follows all commands and directions without difficulty   Subjective   Subjective \"I want to get up and move\"   Transfers   Sit to Stand 4  Minimal assistance   Additional items Assist x 1;Armrests;Increased time required;Verbal cues  (Verbal cues for hand placement)   Stand to Sit 4  Minimal assistance   Additional items Assist x 1;Increased time required;Armrests;Verbal cues   Ambulation/Elevation   Gait pattern Improper Weight shift;Decreased foot clearance;Forward Flexion;Short stride   Gait Assistance 4  Minimal assist   Additional items Assist x 1  (cues for walker management, needed repetitive cues to keep feet within walker)   Assistive Device Rolling walker   Distance 50 ft x2   Balance   Static Sitting Good   Dynamic Sitting Fair +   Static Standing Fair -   Dynamic Standing Fair -   Ambulatory Fair -   Activity Tolerance   Activity Tolerance Patient tolerated treatment well   Nurse Made Aware nurse made aware of outcome   Exercises   UE Exercise 20 reps;Sitting  (up right rows)   Marching Standing;20 reps;Bilateral   Neuro re-ed Ball toss, 2 sets of 20   Balance training  SLS 2 x 20   Assessment   Prognosis Good   Problem List Decreased strength;Decreased endurance;Impaired balance   Goals   Patient Goals To walk   LTG Expiration Date 10/11/24   PT Treatment Day 1   Plan   Treatment/Interventions Functional transfer training;LE strengthening/ROM;Elevations;Therapeutic exercise;Endurance training;Gait training    10/03/24 1539    OT Last Visit   OT Visit Date 10/03/24   Note Type   Note Type Treatment   Pain Assessment   Pain Assessment Tool 0-10   Pain Score No Pain   Restrictions/Precautions   Weight Bearing Precautions Per Order No   Other Precautions Fall Risk   Lifestyle   Autonomy Pt " lives in 2 story home c wife + @ baseline, performs ADLs I'ly, IADLs I'ly + fxl mobility I'ly c walking stick. (+) . Pt is retired.   Reciprocal Relationships Spouse   Service to Others Retired   Intrinsic Gratification Exercising 2-3x/wk   ADL   Eating Assistance 5  Supervision/Setup   Grooming Assistance 5  Supervision/Setup   UB Bathing Assistance 5  Supervision/Setup   LB Bathing Assistance 4  Minimal Assistance   UB Dressing Assistance 5  Supervision/Setup   LB Dressing Assistance 4  Minimal Assistance   LB Dressing Comments Recommending sock aide   Toileting Assistance  4  Minimal Assistance   Transfers   Sit to Stand 4  Minimal assistance   Additional items Assist x 1;Armrests   Stand to Sit 5  Supervision   Additional items Armrests;Verbal cues   Functional Mobility   Additional Comments Pt performed short distance ADL related fxl mobility in room  c [facility], with RW simulating toileting distances.   No overt LOB demonstrated + pt denies pain/ denies SOB/ denies dizziness during transitional movements. G safety awareness noted while navigating t/o room. Transitions to bedside recliner for remainder of session.   Coordination   Gross Motor RUE impaired   Fine Motor RUE impaired   In Hand Manipulation Provided -star for improved in-hand manipulation, dexterity FMC + GMC.   Cognition   Overall Cognitive Status WFL   Arousal/Participation Alert;Responsive;Cooperative   Attention Within functional limits   Orientation Level Oriented X4   Memory Within functional limits   Following Commands Follows all commands and directions without difficulty   Activity Tolerance   Activity Tolerance Patient tolerated treatment well   Assessment   Assessment Pt participated in Skilled OT session with interventions consisting of ADL retraining, functional transfer training, endurance training, patient/family training, equipment evaluation/education, compensatory technique education, energy conservation, and activity  engagement in effort to:  increase BUE strength, fxl activity tolerance, static/dynamic sit/stand balance/tolerance for improved independence c ADLs/IADLs/fxl transfers/ADL related fxl mobility. Performance on this date includes fxl levels as stated in flowsheet.   Given fxl performance skills + deficits, therapist suspecting via clinical judgement + skilled analysis; pt currently requires stated assist above to perform each area of ADL d/t limitations including: generalized muscle weakness, sitting/standing balance deficits, fxl activity tolerance limitations, and new use of AD, RUE GMC/FMC coordination deficits. Occupational performance remains limited secondary to factors listed above and pt @ increased risk for falls and injury. Pt continues to fxn below baseline d/t medical complexity impacting safe return home @ this time.  Since eval/previous treatment session, pt demonstrates  G motivation toward higher level of overall I/S.  D/t aforementioned factors, continuation of skilled occupational therapy services/comprehensive intervention are warranted in efforts to prepare patient for safe d/c to Short Term Rehab. Pt demonstrates WFL ability to collaboratively engage in d/c planning and supportive family involved Goals continued as stated in initial evaluation + therapy will continue to collaborate c pt regarding improvement in all deficit areas  including but not limited to: maximize I c ADLs/IADLs, safe fxl transfer training, energy conservation education, coordination of care, family education, equipment management (DME/AD) + d/c planning.         CARE SCORES:  Self Care:  Eatin: Supervision or touching  assistance  Oral hygiene: 04: Supervision or touching  assistance  Toilet hygiene: 03: Partial/moderate assistance  Shower/bathing self: 03: Partial/moderate assistance  Upper body dressin: Supervision or touching  assistance  Lower body dressin: Partial/moderate assistance  Putting on/taking  off footwear: 03: Partial/moderate assistance  Transfers:  Roll left and right: 03: Partial/moderate assistance  Sit to lyin: Partial/moderate assistance  Lying to sitting on side of bed: 03: Partial/moderate assistance  Sit to stand: 03: Partial/moderate assistance  Chair/bed to chair transfer: 03: Partial/moderate assistance  Toilet transfer: 03: Partial/moderate assistance  Mobility:  Walk 10 ft: 03: Partial/moderate assistance  Walk 50 ft with two turns: 03: Partial/moderate assistance  Walk 150ft: 88: Not attempted due to medical conditions or safety concerns    CURRENT GAP IN FUNCTION  Prior to Admission: Functional Status: Patient was independent with mobility/ambulation, transfers, ADL's, IADL's.    Expected functional outcomes: It is expected that with skilled acute rehabilitation services the patient will progress to Independent for self care and Independent for mobility     Estimated length of stay: 10 to 14 days    Anticipated Post-Discharge Disposition/Treatment  Disposition: Return to previous home/apartment.  Outpatient Services: Physical Therapy (PT) and Occupational Therapy (OT)    BARRIERS TO DISCHARGE  Weakness, Balance Difficulty, Fatigue, Home Accessibility, Caregiver Accessibility, and Equipment Needs    INTERVENTIONS FOR DISCHARGE  Adaptive equipment, Patient/Family/Caregiver Education, Community Resources, Arrange DME needs, Therapy exercises, and Energy conservation education     REQUIRED THERAPY:  Patient will require PT and OT 90 minutes each per day, five days per week to achieve rehab goals.     REQUIRED FUNCTIONAL AND MEDICAL MANAGEMENT FOR INPATIENT REHABILITATION:  Skin:  Monitor skin for breakdown, Deep Vein Thrombosis (DVT) Prophylaxis:  Per MD orders, Diabetes Management: continue sliding scale insulin, patient to do finger sticks as ordered, SLIM to continue to manage diabetes, further internal medicine management of additional medical conditions while on ARC, PT/OT  intervention, patient/family education and training, and any needed consults PRN.    RECOMMENDED LEVEL OF CARE:    Pt is a 78 y.o. male with HTN, HLD, DM, and COPD who initially presented to [facility] on 9/29 and was admitted for acute respiratory failure with hypoxia due to COPD exacerbation in the setting of recent COVID infection. Pt hospitalized with Covid infection 9/24-9/26/2024.  There was concern for pneumonia exacerbating his respiratory failure. Pt met sepsis criteria with tachycardia and leukocytosis (noted to be on chronic steroids). Imaging with new left lingular markings. Started on ceftriaxone and azithromycin in the ER as well as steroids for COPD. He initially required CPAP, then BiPAP, and then was able to be weaned to nasal cannula. Pt is currently on RA. Pulmonology recommendations appreciated.  Continue azithromycin/ceftriaxone to complete 7 days. Continue 3% sodium chloride/Xopenex via nebulizer tid. PT worked with the patient morning of 10/1 and noted that he seemed less coordinated on the right side compared to his left. Pt himself began to complain that he felt he could not use his right arm as much as his left yesterday but also felt that he may have slept on it. He has been deconditioned from recent admission for COVID and now readmission. Imaging showed multifocal infarcts bilaterally and multifocal intracranial atherosclerotic disease. At least some of these infarcts appear watershed appearance and has severe stenosis in both intracranial ICAs.  This is likely due to episodes of hypotension or hypoperfusion he may have had during his illness.  Other infarcts appear embolic although unclear etiology.  Obtain CT chest abdomen pelvis to evaluate for occult malignancy. CT chest abdomen pelvis without evidence of malignancy. TTE: LVEF 50%.  Did note mild hypokinesia of the posterior wall. Evaluated by cardiology planning outpatient event monitor and stress test.    Pt was independent  PTA with transfers, amb, and ADLs. Pt lives with spouse in a [place] home with flight of [place]. Pt is currently functioning below baseline needing Min A for ADLs and Min A for transfers/amb. Pt would benefit from ARC admission to have close medical management while participating in 3 hours of therapy per day that will include physical and occupational therapy. Physical and occupational therapists will address functional mobility deficits and assist patient in improving their strength, endurance, ROM, and self care. The rehab nursing staff will follow therapy recommendations to have 24 hour follow through during non-therapy hours. PM&R to maximize function and provide medical oversight. The MD will monitor patient co-morbidities while on the unit as well as order any additional tests, labs, and consults needed. The [facility] specialized interdisciplinary team will meet weekly to discuss patient overall medical status and rehab goals in preparation for D/C home. Inpatient acute rehab is recommended for patient to maximize overall strength, endurance, self care, and mobility for a safe and timely transition back home.

## 2024-10-04 NOTE — NURSING NOTE
New ARC admission with multiple infarcts.  Recent + Covid and pneumonia.  Lungs decreased with occ moist productive cough.   Edema BLE and feet.  In no distress. G Oriented to room and surroundings and rehab therapy.  Stroke education provided.  Callbell within reach at all times and safety maintained.  Wife visiting and reviewed above.  Care plan initiated.  Generalized weakness but pt states right is more weak and limited.

## 2024-10-04 NOTE — H&P
Chetan Van#  :1945 BOBBI  MRN:[id number]    CSN:[id number]  Adm Date: 10/4/2024 1324  1:24 PM   ATT PHY: Christian Hendrickson Md  [facility]         Chief Complaint: CVA      History of Presenting Illness: Gold Van is a(n) 78 y.o. year old male who is admitted to [facility].  Patient originally presented to [facility] ED on  for acute shortness of breath.  Patient had recent hospitalization from - for COVID.  He arrived to ED on CPAP and then transferred over to BiP for  hours.  CXR showing mild opacity in the left base which could be due to atelectasis or pneumonia.  Patient started on IV ceftriazone, azithromycin and Solu-Medrol for suspected COPD exacerbation and pneumonia.  Sputum C&S positive for pseudomonas.  Patient was switched to IV cefepime and then transitioned to PO Vantin to complete 5-day course of antibiotics as recommended by pulmonology.   On 10/1 patient evaluated by therapy who noted new onset of right sided appendicular ataxia and truncal ataxia.   Neurology consulted.  Patient loaded on ASA 325mg and Plavix 300mg followed by aspirin 81mg daily and Plavix 75mg daily.  Also started on atorvastatin 40 mg daily.  CTA head/neck showing acute or subacute cortical infarct in the left precentral gyrus, severe multifocal atherosclerotic stenosis in the cavernous and supraclinoid segments of bilateral internal carotid arteries, severe atherosclerotic stenosis in pre-PICA segments of bilateral intracranial vertebral arteries, focal moderate mid basilar artery stenosis, atherosclerotic stenosis of the cervical ICAs.  MRI brain with multiple foci of acute infarcts involving bilateral frontoparietal cortices and subcortical white matter.  Some of the infarcts noted with some appearing watershed likely due to episodes of hypotension or hypoperfusion during his illness.  Other infarcts appear embolic  but of unclear etiology.  Neurology recommended DAPT for 90 days total then ASA monotherapy as well as continuing with high intensity statin.  Neurology also recommended pan-CT to evaluate for underlying malignancy.  CT c/a/p without evidence of primary malignancy.  TTE with LVEF 50% and mild hypokinesia of the posterior wall.  Patient seen by cardiology in consultation and will need outpatient monitor and stress test.  Patient was seen by PT OT in consultation who recommended inpatient rehab services.     Patient examined at bedside.  Patient feeling well at this time and looking forward to therapy.  Reviewed home medications with patient and wife.     Allergies   Allergen Reactions    Ciprofloxacin Shortness Of Breath    Sulfamethoxazole Shortness Of Breath    Trimethoprim Shortness Of Breath    Dexamethasone Other (See Comments)    Triamcinolone Other (See Comments)    Bactrim [Sulfamethoxazole-Trimethoprim] Fever     Fever of 107     Current Facility-Administered Medications on File Prior to Encounter   Medication Dose Route Frequency Provider Last Rate Last Admin    [COMPLETED] predniSONE tablet 40 mg  40 mg Oral Daily Shakir Woods MD   40 mg at 10/04/24 0858    [DISCONTINUED] acetaminophen (TYLENOL) tablet 650 mg  650 mg Oral Q6H PRN BELGICA Thompson        [DISCONTINUED] albuterol (PROVENTIL HFA,VENTOLIN HFA) inhaler 2 puff  2 puff Inhalation Q4H PRN Binh Hurd, DO   2 puff at 10/04/24 1152    [DISCONTINUED] albuterol inhalation solution 2.5 mg  2.5 mg Nebulization Q4H PRN Binh Hurd, DO   2.5 mg at 09/29/24 1446    [DISCONTINUED] amLODIPine (NORVASC) tablet 5 mg  5 mg Oral BID BELGICA Thompson   5 mg at 10/04/24 0858    [DISCONTINUED] aspirin chewable tablet 81 mg  81 mg Oral Daily BELGICA Thompson   81 mg at 10/04/24 0858    [DISCONTINUED] atenolol (TENORMIN) tablet 25 mg  25 mg Oral Daily BELGICA Thompson   25 mg at 10/04/24 0858    [DISCONTINUED] atorvastatin  (LIPITOR) tablet 40 mg  40 mg Oral QPM Molly M BELGICA Anderson   40 mg at 10/03/24 1816    [DISCONTINUED] benzonatate (TESSALON PERLES) capsule 100 mg  100 mg Oral TID PRN Molly BOBBI BELGICA Anderson        [DISCONTINUED] cefepime (MAXIPIME) IVPB (premix in dextrose) 2,000 mg 50 mL  2,000 mg Intravenous Q12H BELGICA Nelson 100 mL/hr at 10/04/24 0450 2,000 mg at 10/04/24 0450    [DISCONTINUED] cefpodoxime (VANTIN) tablet 400 mg  400 mg Oral BID With Meals BELGICA Nelson        [DISCONTINUED] clopidogrel (PLAVIX) tablet 75 mg  75 mg Oral Daily BELGICA Thompson   75 mg at 10/04/24 0858    [DISCONTINUED] enoxaparin (LOVENOX) subcutaneous injection 40 mg  40 mg Subcutaneous Daily BELGICA Thompson   40 mg at 10/04/24 0858    [DISCONTINUED] famotidine (PEPCID) tablet 20 mg  20 mg Oral BID BELGICA Thompson   20 mg at 10/04/24 0858    [DISCONTINUED] guaiFENesin (MUCINEX) 12 hr tablet 600 mg  600 mg Oral BID Molly M BELGICA Anderson   600 mg at 10/04/24 0858    [DISCONTINUED] insulin glargine (LANTUS) subcutaneous injection 15 Units 0.15 mL  15 Units Subcutaneous HS BELGICA Nelson   15 Units at 10/03/24 2159    [DISCONTINUED] insulin lispro (HumALOG/ADMELOG) 100 units/mL subcutaneous injection 2-12 Units  2-12 Units Subcutaneous TID AC BELGICA Thompson   4 Units at 10/04/24 1152    [DISCONTINUED] insulin lispro (HumALOG/ADMELOG) 100 units/mL subcutaneous injection 2-12 Units  2-12 Units Subcutaneous HS Molly M BELGICA Anderson   4 Units at 10/03/24 2159    [DISCONTINUED] levalbuterol (XOPENEX) inhalation solution 1.25 mg  1.25 mg Nebulization TID Shakir Woods MD   1.25 mg at 10/04/24 0847    [DISCONTINUED] lisinopril (ZESTRIL) tablet 20 mg  20 mg Oral BID BELGICA Thompson   20 mg at 10/04/24 0858    [DISCONTINUED] LORazepam (ATIVAN) tablet 0.5 mg  0.5 mg Oral HS PRN BELGICA Thompson        [DISCONTINUED] montelukast (SINGULAIR) tablet 10 mg  10 mg Oral HS Molly MARTINES  BELGICA Anderson   10 mg at 10/03/24 2798    [DISCONTINUED] sodium chloride 3 % inhalation solution 4 mL  4 mL Nebulization TID Shakir Woods MD   4 mL at 10/04/24 0847     Current Outpatient Medications on File Prior to Encounter   Medication Sig Dispense Refill    albuterol (ACCUNEB) 1.25 MG/3ML nebulizer solution Take 1.25 mg by nebulization every 6 (six) hours as needed for wheezing      albuterol (PROVENTIL HFA,VENTOLIN HFA) 90 mcg/act inhaler Ventolin HFA 90 mcg/actuation aerosol inhaler      amLODIPine (NORVASC) 5 mg tablet Take 5 mg by mouth 2 (two) times a day      [START ON 10/5/2024] aspirin 81 mg chewable tablet Chew 1 tablet (81 mg total) daily      atenolol (TENORMIN) 25 mg tablet Take 25 mg by mouth daily      atorvastatin (LIPITOR) 40 mg tablet Take 1 tablet (40 mg total) by mouth every evening      benzonatate (TESSALON PERLES) 100 mg capsule Take 1 capsule (100 mg total) by mouth 3 (three) times a day as needed for cough 20 capsule 0    Budeson-Glycopyrrol-Formoterol (Breztri Aerosphere) 160-9-4.8 MCG/ACT AERO Take 2 puffs by mouth Every 12 hours      cefpodoxime (VANTIN) 200 mg tablet Take 2 tablets (400 mg total) by mouth 2 (two) times a day with meals for 4 doses      [START ON 10/5/2024] clopidogrel (PLAVIX) 75 mg tablet Take 1 tablet (75 mg total) by mouth daily      famotidine (PEPCID) 20 mg tablet Take 1 tablet (20 mg total) by mouth 2 (two) times a day      glipiZIDE (GLUCOTROL XL) 2.5 mg 24 hr tablet Take 2.5 mg by mouth 2 (two) times a day      ipratropium (ATROVENT) 0.02 % nebulizer solution Take 0.5 mg by nebulization 4 (four) times a day      lisinopril (ZESTRIL) 20 mg tablet Take 20 mg by mouth 2 (two) times a day      LORazepam (ATIVAN) 0.5 mg tablet Take 0.5 mg by mouth daily at bedtime as needed      metFORMIN (GLUCOPHAGE) 500 mg tablet Take 500 mg by mouth 2 (two) times a day with meals 750 MG ONCE A DAY AT DINNER      montelukast (SINGULAIR) 10 mg tablet Take 10 mg by  mouth in the morning      predniSONE 5 mg tablet Take 1 tablet (5 mg total) by mouth daily ALTERNATES 5MG AND 15MG EOD Do not start before September 30, 2024.      [DISCONTINUED] famotidine (PEPCID) 20 mg tablet Take 1 tablet (20 mg total) by mouth daily (Patient taking differently: Take 20 mg by mouth 2 (two) times a day)  0    [DISCONTINUED] predniSONE 20 mg tablet Take 2 tablets (40 mg total) by mouth daily for 3 days Starting on 9/27/24 6 tablet 0     Active Ambulatory Problems     Diagnosis Date Noted    Type 2 diabetes mellitus with complication, without long-term current use of insulin ([facility]) 10/23/2017    Essential hypertension 10/23/2017    Asthma 10/23/2017    Hyperlipidemia 10/23/2017    Sepsis due to pneumonia ([facility]) 03/24/2021    Hyponatremia 03/24/2021    Pulmonary nodule 03/24/2021    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 06/16/2021    Non-recurrent bilateral inguinal hernia without obstruction or gangrene 03/18/2024    Pruritus 03/18/2024    History of pneumothorax 04/28/2024    Acute respiratory failure with hypoxia (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) 04/29/2024    Shortness of breath 07/23/2024    COVID-19 09/24/2024    Acute respiratory insufficiency 09/24/2024    Dysmetria 10/01/2024    CVA (cerebrovascular accident) ([facility]) 10/02/2024    Abnormal echocardiogram 10/03/2024     Resolved Ambulatory Problems     Diagnosis Date Noted    Chest pain 03/22/2019    UTI (urinary tract infection) 03/24/2021    GERDA (acute kidney injury) ([facility]) 03/24/2021     Past Medical History:   Diagnosis Date    Diabetes mellitus ([facility])     Environmental allergies     Hypertension     Macular degeneration 07/13/2020    Orthostatic hypotension     Osteopenia     Pneumothorax     Sinusitis      Past Surgical History:   Procedure Laterality Date    COLONOSCOPY      KNEE CARTILAGE SURGERY Right 1964    VASECTOMY      WISDOM TOOTH EXTRACTION      x4     Social History:   Social History      Socioeconomic History    Marital status: /Civil Union     Spouse name: Not on file    Number of children: Not on file    Years of education: Not on file    Highest education level: Not on file   Occupational History    Not on file   Tobacco Use    Smoking status: Former    Smokeless tobacco: Never    Tobacco comments:     quit about 25 years ago   Vaping Use    Vaping status: Never Used   Substance and Sexual Activity    Alcohol use: Never    Drug use: Never    Sexual activity: Yes     Partners: Female   Other Topics Concern    Not on file   Social History Narrative    Daily caffeine use- 1 cup of tea, 2 cups of coffee     Social Determinants of Health     Financial Resource Strain: Not on file   Food Insecurity: No Food Insecurity (10/1/2024)    Hunger Vital Sign     Worried About Running Out of Food in the Last Year: Never true     Ran Out of Food in the Last Year: Never true   Transportation Needs: No Transportation Needs (10/1/2024)    PRAPARE - Transportation     Lack of Transportation (Medical): No     Lack of Transportation (Non-Medical): No   Physical Activity: Not on file   Stress: Not on file   Social Connections: Unknown (6/18/2024)    Received from [employer]    Social Connections     How often do you feel lonely or isolated from those around you? (Adult - for ages 18 years and over): Not on file   Intimate Partner Violence: Not on file   Housing Stability: Low Risk  (10/1/2024)    Housing Stability Vital Sign     Unable to Pay for Housing in the Last Year: No     Number of Times Moved in the Last Year: 0     Homeless in the Last Year: No     Family History:   Family History   Problem Relation Age of Onset    Asthma Mother     Emphysema Mother     Cancer Father     Asthma Brother     Cancer Brother      Review of Systems   Constitutional:  Negative for chills and fever.   HENT:  Negative for ear pain and sore throat.    Eyes:  Negative for pain and visual disturbance.   Respiratory:  Positive  for cough. Negative for shortness of breath.    Cardiovascular:  Negative for chest pain and palpitations.   Gastrointestinal:  Negative for abdominal pain, constipation, diarrhea, nausea and vomiting.   Genitourinary:  Negative for dysuria and hematuria.   Musculoskeletal:  Negative for arthralgias and back pain.   Skin:  Negative for color change and rash.   Neurological:  Positive for weakness. Negative for dizziness and headaches.   All other systems reviewed and are negative.    Physical Exam   Vitals: Blood pressure 126/63, pulse 67, temperature 97.7 °F (36.5 °C), temperature source Temporal, resp. rate 18, height 6' (1.829 m), weight 70.6 kg (155 lb 10.3 oz), SpO2 97%.,Body mass index is 21.11 kg/m².  Constitutional: Awake, alert, in no acute distress.  Head: Normocephalic and atraumatic.   Mouth/Throat: Oropharynx is clear and moist.    Eyes: Conjunctivae and EOM are normal.   Neck: Neck supple.   Cardiovascular: Normal rate, regular rhythm.  Pulmonary/Chest: Effort normal and breath sounds normal.   Abdominal: Soft. Bowel sounds are normal. There is no tenderness. There is no rebound and no guarding.   Neurological: Alert, oriented to person, place, and time.  Skin: Skin is warm and dry.  +1 lower extremity edema.     Assessment     Gold Van is a(n) 78 y.o. male with CVA.    Hypertension.  Patient is on lisinopril 20 mg twice daily, amlodipine 5 mg twice daily, atenolol 25 mg daily.  Type 2 diabetes mellitus.  Hgb A1c 10.6% on 10/2/24. Restart home glipizide 2.5 mg twice daily 10/4 and discontinue Lantus.  Wife to bring metformin  mg daily from home - on metformin  mg daily for now.  SSI with accu-checks ac/hs.  Carb controlled diet.  GERD.  Patient is on famotidine 20 mg twice daily.   Chronic generalized pruritus.  Ongoing past 5-6 years.  Pt/wife reports receiving extensive workup in the past from various specialists including dermatology and allergist.  Continue alternating prednisone  5 mg and 15 mg daily.    COPD/asthma.  Shea brought from home.  Continue Singulair 10 mg nightly and Xopenox/sodium chloride neb TID.  Pt follows with [facility] pulmonology.    Pneumonia.  Sputum C&S on 10/1 positive for pseudomonas.  Patient was treated with IV cefepime and then transitioned to PO Vantin to complete 5-day course of antibiotics as recommended by pulmonology.  Continue Vantin 400 mg twice daily x 4 more doses (thru 10/6), Mucinex 600 mg twice daily, Xopenox/sodium chloride neb TID.  Tessalon perles as needed.  Incentive spirometry.   Abnormal echocardiogram.  TTE on 10/1 with posterior wall motion abnormality.  Cardiology saw patient on acute care and recommended outpatient stress test.  Pain and bowel regimen.  As per physiatry.   CVA.   Neurology recommended DAPT with Plavix/ASA x90 days total (~12/29/24) then ASA monotherapy.  Cont atorvastatin 40 mg daily.  Follow-up with neurology in 6 weeks.  Follow-up with cardiology for Zio patch and/or loop recorder on discharge.  Patient is receiving intensive PT OT with management as per physiatry.       Prognosis: Fair.    Discharge Plan: In progress.    Advanced Directives: I have discussed in detail with the patient the advanced directives. The patient states his POA is his wife, Mya Van, whose number is [phone].  Patient states he does have a living will with advanced healthcare directives. When discussing cardiac and pulmonary resuscitation efforts with the patient, the patient wishes to be full code.    I have spent more than 50 minutes gathering data, doing physical examination, and discussing the advanced directives, which was witnessed by caring staff.    The patient was discussed with Dr. Hendrickson and he is in agreement with the above note.

## 2024-10-04 NOTE — PLAN OF CARE
Problem: Neurological Deficit  Goal: Neurological status is stable or improving  Description: Interventions:  - Monitor and assess patient's level of consciousness, motor function, sensory function, and level of assistance needed for ADLs.   - Monitor and report changes from baseline. Collaborate with interdisciplinary team to initiate plan and implement interventions as ordered.   - Provide and maintain a safe environment.  - Consider fall precautions.  - Consider aspiration precautions.    Outcome: Progressing     Problem: Potential for Aspiration  Goal: Non-ventilated patient's risk of aspiration is minimized  Description: Assess and monitor vital signs, respiratory status, and labs (WBC).  Monitor for signs of aspiration (tachypnea, cough, rales, wheezing, cyanosis, fever).    - Assess and monitor patient's ability to swallow.  - Place patient up in chair to eat if possible.  - HOB up at 90 degrees to eat if unable to get patient up into chair.  - Supervise patient during oral intake.   - Instruct patient/ family to take small bites.  - Instruct patient/ family to take small single sips when taking liquids.  - Follow patient-specific strategies generated by speech pathologist.  Outcome: Progressing

## 2024-10-04 NOTE — CASE MANAGEMENT
CM met with patient, during nebulizer treatment, explained rehab routine and CM role with introduction. Patient lives with wife in raised ranch home with 13 NEERU,.Full bath with walk in shower. Patient independent PTA, including driving. Patient amvulates with walking stick ,also has nebulizer machine and RW. Patient has prior experience with outpt PT at [facility], has no prior experience with [facility] or [facility]. CM confirmed patient's insurance , address and emergency contact with patient. CM explained team meeting process length of stay with insurance coverage. Patient continued with nebulizer treatment, CM will follow for discharge needs.

## 2024-10-04 NOTE — ASSESSMENT & PLAN NOTE
Lab Results   Component Value Date    HGBA1C 10.6 (H) 10/02/2024       Recent Labs     10/03/24  1617 10/03/24  2114 10/04/24  0731 10/04/24  1124   POCGLU 300* 222* 90 225*       Blood Sugar Average: Last 72 hrs:  (P) [id number]    Target blood sugar inpatient 140-180   Optimized on current regimen  Continue prehospital diabetic regimen on discharge

## 2024-10-05 LAB
ALBUMIN SERPL BCG-MCNC: 2.9 G/DL (ref 3.5–5)
ALP SERPL-CCNC: 38 U/L (ref 34–104)
ALT SERPL W P-5'-P-CCNC: 21 U/L (ref 7–52)
ANION GAP SERPL CALCULATED.3IONS-SCNC: 6 MMOL/L (ref 4–13)
AST SERPL W P-5'-P-CCNC: 13 U/L (ref 13–39)
BACTERIA SPT RESP CULT: ABNORMAL
BASOPHILS # BLD AUTO: 0.04 THOUSANDS/ΜL (ref 0–0.1)
BASOPHILS NFR BLD AUTO: 1 % (ref 0–1)
BILIRUB SERPL-MCNC: 0.57 MG/DL (ref 0.2–1)
BUN SERPL-MCNC: 23 MG/DL (ref 5–25)
CALCIUM ALBUM COR SERPL-MCNC: 9.1 MG/DL (ref 8.3–10.1)
CALCIUM SERPL-MCNC: 8.2 MG/DL (ref 8.4–10.2)
CHLORIDE SERPL-SCNC: 103 MMOL/L (ref 96–108)
CO2 SERPL-SCNC: 24 MMOL/L (ref 21–32)
CREAT SERPL-MCNC: 0.83 MG/DL (ref 0.6–1.3)
EOSINOPHIL # BLD AUTO: 0.03 THOUSAND/ΜL (ref 0–0.61)
EOSINOPHIL NFR BLD AUTO: 0 % (ref 0–6)
ERYTHROCYTE [DISTWIDTH] IN BLOOD BY AUTOMATED COUNT: 13.4 % (ref 11.6–15.1)
GFR SERPL CREATININE-BSD FRML MDRD: 84 ML/MIN/1.73SQ M
GLUCOSE P FAST SERPL-MCNC: 125 MG/DL (ref 65–99)
GLUCOSE SERPL-MCNC: 109 MG/DL (ref 65–140)
GLUCOSE SERPL-MCNC: 125 MG/DL (ref 65–140)
GLUCOSE SERPL-MCNC: 240 MG/DL (ref 65–140)
GLUCOSE SERPL-MCNC: 260 MG/DL (ref 65–140)
GLUCOSE SERPL-MCNC: 361 MG/DL (ref 65–140)
GRAM STN SPEC: ABNORMAL
HCT VFR BLD AUTO: 35.2 % (ref 36.5–49.3)
HGB BLD-MCNC: 11.7 G/DL (ref 12–17)
IMM GRANULOCYTES # BLD AUTO: 0.2 THOUSAND/UL (ref 0–0.2)
IMM GRANULOCYTES NFR BLD AUTO: 2 % (ref 0–2)
LYMPHOCYTES # BLD AUTO: 1.14 THOUSANDS/ΜL (ref 0.6–4.47)
LYMPHOCYTES NFR BLD AUTO: 13 % (ref 14–44)
MCH RBC QN AUTO: 30.1 PG (ref 26.8–34.3)
MCHC RBC AUTO-ENTMCNC: 33.2 G/DL (ref 31.4–37.4)
MCV RBC AUTO: 91 FL (ref 82–98)
MONOCYTES # BLD AUTO: 0.71 THOUSAND/ΜL (ref 0.17–1.22)
MONOCYTES NFR BLD AUTO: 8 % (ref 4–12)
NEUTROPHILS # BLD AUTO: 6.66 THOUSANDS/ΜL (ref 1.85–7.62)
NEUTS SEG NFR BLD AUTO: 76 % (ref 43–75)
NRBC BLD AUTO-RTO: 0 /100 WBCS
PLATELET # BLD AUTO: 252 THOUSANDS/UL (ref 149–390)
PMV BLD AUTO: 10.1 FL (ref 8.9–12.7)
POTASSIUM SERPL-SCNC: 3.9 MMOL/L (ref 3.5–5.3)
PROT SERPL-MCNC: 5.3 G/DL (ref 6.4–8.4)
RBC # BLD AUTO: 3.89 MILLION/UL (ref 3.88–5.62)
SODIUM SERPL-SCNC: 133 MMOL/L (ref 135–147)
WBC # BLD AUTO: 8.78 THOUSAND/UL (ref 4.31–10.16)

## 2024-10-05 PROCEDURE — 85025 COMPLETE CBC W/AUTO DIFF WBC: CPT

## 2024-10-05 PROCEDURE — 97116 GAIT TRAINING THERAPY: CPT | Performed by: PHYSICAL THERAPIST

## 2024-10-05 PROCEDURE — 97112 NEUROMUSCULAR REEDUCATION: CPT | Performed by: PHYSICAL THERAPIST

## 2024-10-05 PROCEDURE — 97163 PT EVAL HIGH COMPLEX 45 MIN: CPT | Performed by: PHYSICAL THERAPIST

## 2024-10-05 PROCEDURE — 99232 SBSQ HOSP IP/OBS MODERATE 35: CPT | Performed by: PHYSICAL MEDICINE & REHABILITATION

## 2024-10-05 PROCEDURE — 80053 COMPREHEN METABOLIC PANEL: CPT

## 2024-10-05 PROCEDURE — 97110 THERAPEUTIC EXERCISES: CPT | Performed by: PHYSICAL THERAPIST

## 2024-10-05 PROCEDURE — 97530 THERAPEUTIC ACTIVITIES: CPT | Performed by: PHYSICAL THERAPIST

## 2024-10-05 PROCEDURE — 97542 WHEELCHAIR MNGMENT TRAINING: CPT | Performed by: PHYSICAL THERAPIST

## 2024-10-05 PROCEDURE — 82948 REAGENT STRIP/BLOOD GLUCOSE: CPT

## 2024-10-05 RX ORDER — METFORMIN HYDROCHLORIDE 500 MG/1
500 TABLET, EXTENDED RELEASE ORAL
Status: COMPLETED | OUTPATIENT
Start: 2024-10-05 | End: 2024-10-05

## 2024-10-05 RX ORDER — POLYETHYLENE GLYCOL 3350 17 G/17G
17 POWDER, FOR SOLUTION ORAL DAILY PRN
Status: DISCONTINUED | OUTPATIENT
Start: 2024-10-05 | End: 2024-10-06

## 2024-10-05 RX ORDER — SODIUM CHLORIDE FOR INHALATION 3 %
VIAL, NEBULIZER (ML) INHALATION
Status: DISPENSED
Start: 2024-10-05 | End: 2024-10-05

## 2024-10-05 RX ORDER — AMOXICILLIN 250 MG
1 CAPSULE ORAL
Status: DISCONTINUED | OUTPATIENT
Start: 2024-10-05 | End: 2024-10-17 | Stop reason: HOSPADM

## 2024-10-05 RX ADMIN — GUAIFENESIN 600 MG: 600 TABLET ORAL at 17:11

## 2024-10-05 RX ADMIN — SODIUM CHLORIDE SOLN NEBU 3% 4 ML: 3 NEBU SOLN at 13:36

## 2024-10-05 RX ADMIN — MONTELUKAST 10 MG: 10 TABLET, FILM COATED ORAL at 21:08

## 2024-10-05 RX ADMIN — GLIPIZIDE 2.5 MG: 2.5 TABLET, EXTENDED RELEASE ORAL at 17:12

## 2024-10-05 RX ADMIN — ASPIRIN 81 MG 81 MG: 81 TABLET ORAL at 08:40

## 2024-10-05 RX ADMIN — AMLODIPINE BESYLATE 5 MG: 5 TABLET ORAL at 08:42

## 2024-10-05 RX ADMIN — INSULIN LISPRO 6 UNITS: 100 INJECTION, SOLUTION INTRAVENOUS; SUBCUTANEOUS at 12:03

## 2024-10-05 RX ADMIN — ATENOLOL 25 MG: 25 TABLET ORAL at 08:44

## 2024-10-05 RX ADMIN — AMLODIPINE BESYLATE 5 MG: 5 TABLET ORAL at 21:07

## 2024-10-05 RX ADMIN — BUDESONIDE, GLYCOPYRROLATE, AND FORMOTEROL FUMARATE 2 PUFF: 160; 9; 4.8 AEROSOL, METERED RESPIRATORY (INHALATION) at 21:10

## 2024-10-05 RX ADMIN — LEVALBUTEROL HYDROCHLORIDE 1.25 MG: 1.25 SOLUTION RESPIRATORY (INHALATION) at 11:06

## 2024-10-05 RX ADMIN — CEFPODOXIME PROXETIL 400 MG: 200 TABLET, FILM COATED ORAL at 08:44

## 2024-10-05 RX ADMIN — LEVALBUTEROL HYDROCHLORIDE 1.25 MG: 1.25 SOLUTION RESPIRATORY (INHALATION) at 13:36

## 2024-10-05 RX ADMIN — SODIUM CHLORIDE SOLN NEBU 3% 4 ML: 3 NEBU SOLN at 19:48

## 2024-10-05 RX ADMIN — SODIUM CHLORIDE SOLN NEBU 3% 4 ML: 3 NEBU SOLN at 11:06

## 2024-10-05 RX ADMIN — FAMOTIDINE 20 MG: 20 TABLET, FILM COATED ORAL at 08:39

## 2024-10-05 RX ADMIN — INSULIN LISPRO 10 UNITS: 100 INJECTION, SOLUTION INTRAVENOUS; SUBCUTANEOUS at 17:13

## 2024-10-05 RX ADMIN — ENOXAPARIN SODIUM 40 MG: 40 INJECTION SUBCUTANEOUS at 08:39

## 2024-10-05 RX ADMIN — CLOPIDOGREL 75 MG: 75 TABLET ORAL at 08:40

## 2024-10-05 RX ADMIN — CEFPODOXIME PROXETIL 400 MG: 200 TABLET, FILM COATED ORAL at 17:11

## 2024-10-05 RX ADMIN — LISINOPRIL 20 MG: 20 TABLET ORAL at 08:40

## 2024-10-05 RX ADMIN — PREDNISONE 5 MG: 5 TABLET ORAL at 08:41

## 2024-10-05 RX ADMIN — INSULIN LISPRO 3 UNITS: 100 INJECTION, SOLUTION INTRAVENOUS; SUBCUTANEOUS at 21:09

## 2024-10-05 RX ADMIN — LEVALBUTEROL HYDROCHLORIDE 1.25 MG: 1.25 SOLUTION RESPIRATORY (INHALATION) at 19:52

## 2024-10-05 RX ADMIN — FAMOTIDINE 20 MG: 20 TABLET, FILM COATED ORAL at 17:12

## 2024-10-05 RX ADMIN — GLIPIZIDE 2.5 MG: 2.5 TABLET, EXTENDED RELEASE ORAL at 08:42

## 2024-10-05 RX ADMIN — METFORMIN ER 500 MG 500 MG: 500 TABLET ORAL at 17:11

## 2024-10-05 RX ADMIN — ATORVASTATIN CALCIUM 40 MG: 40 TABLET, FILM COATED ORAL at 17:12

## 2024-10-05 RX ADMIN — LISINOPRIL 20 MG: 20 TABLET ORAL at 21:08

## 2024-10-05 RX ADMIN — GUAIFENESIN 600 MG: 600 TABLET ORAL at 08:39

## 2024-10-05 NOTE — NURSING NOTE
Pt cont of B&B this shift. Transfers Ax1 RW. Sacrum allevyn and B/LLE 4x4 wound care completed. Pt tolerated well. No c/o of pain this shift. Pt wife brought in home med, see new orders starting 10/6. Pt currently sitting in recliner chair with wife in room watching TV, call bell in reach, all needs met.

## 2024-10-05 NOTE — PROGRESS NOTES
Progress Note - Gold Van 78 y.o. male MRN: [id number]    Unit/Bed#: [facility] 216-01 Encounter: [id number]        Subjective:   Patient seen and examined at bedside after reviewing the chart and discussing the case with the caring staff.      Patient examined at bedside.  Patient reportedly feels constipated and wants something for it on as-needed basis.  Patient does have weakness of the right hand and lower extremity.    Labs were reviewed from 10/5/2024.  Patient's CMP showed sodium 133.  Patient's CBC showed hemoglobin 11.7 with hematocrit 35.2.    Physical Exam   Vitals: Blood pressure 127/59, pulse 65, temperature 98.3 °F (36.8 °C), temperature source Temporal, resp. rate 17, height 6' (1.829 m), weight 70.6 kg (155 lb 10.3 oz), SpO2 94%.,Body mass index is 21.11 kg/m².  Constitutional: He appears well-developed.   HEENT: PERR, EOMI, MMM  Cardiovascular: Normal rate and regular rhythm.    Pulmonary/Chest: Effort normal and breath sounds normal.   Abdomen: Soft, + BS, NT    Assessment/Plan:  Gold Van is a(n) 78 y.o. male with CVA.     Hypertension.  Patient is on lisinopril 20 mg twice daily, amlodipine 5 mg twice daily, atenolol 25 mg daily.  Type 2 diabetes mellitus.  Hgb A1c 10.6% on 10/2/24. Restart home glipizide 2.5 mg twice daily 10/4 and discontinue Lantus.  Wife to bring metformin  mg daily in the evening from home which will be started on 10/6/2024- on metformin  mg daily for now.  SSI with accu-checks ac/hs.  Carb controlled diet.  GERD.  Patient is on famotidine 20 mg twice daily.   Chronic generalized pruritus.  Ongoing past 5-6 years.  Pt/wife reports receiving extensive workup in the past from various specialists including dermatology and allergist.  Continue alternating prednisone 5 mg and 15 mg daily.    COPD/asthma.  Breztri brought from home.  Continue Singulair 10 mg nightly and Xopenox/sodium chloride neb TID.  Pt follows with [facility] pulmonology.    Pneumonia.  Sputum  C&S on 10/1 positive for pseudomonas.  Patient was treated with IV cefepime and then transitioned to PO Vantin to complete 5-day course of antibiotics as recommended by pulmonology.  Continue Vantin 400 mg twice daily x 4 more doses (thru 10/6), Mucinex 600 mg twice daily, Xopenox/sodium chloride neb TID.  Tessalon perles as needed.  Incentive spirometry.   Abnormal echocardiogram.  TTE on 10/1 with posterior wall motion abnormality.  Cardiology saw patient on acute care and recommended outpatient stress test.  Constipation.  I will put the patient on Senokot S daily as needed and MiraLAX as needed 10/5/2024.  Pain and bowel regimen.  As per physiatry.   CVA.   Neurology recommended DAPT with Plavix/ASA x90 days total (~12/29/24) then ASA monotherapy.  Cont atorvastatin 40 mg daily.  Follow-up with neurology in 6 weeks.  Follow-up with cardiology for Zio patch and/or loop recorder on discharge.  Patient is receiving intensive PT OT with management as per physiatry.     Anticipated date of discharge.  TBD.

## 2024-10-05 NOTE — ASSESSMENT & PLAN NOTE
Suspected COPD exacerbation in setting of COVID infection  Initially requiring CPAP, transitioned to BiPAP, successfully weaned to room air  Appreciate input of pulmonology who followed  Received IV Solu-Medrol, transitioned to oral prednisone on 10/2  Continue robust regimen of meds for baseline COPD

## 2024-10-05 NOTE — PROGRESS NOTES
"   10/05/24 1035   Patient Data   Rehab Impairment Impairment of mobility, safety, Activities of Daily Living (ADLs), and cognitive/communication skills due to Stroke: 01.9 Other Stroke Etiologic: Multiple foci of acute infarcts involving bilateral frontoparietal cortices and subcortical white matter (left greater than right)   Etiologic Diagnosis CVA   Support System   Name Stacia   Relationship Spouse   Home Setup   Type of Home Split Level  (\"[place] ranch\"Pt reported primarly entrance through garage with 6+6 steps to main living area with bedrooms, two full bathrooms, kitchen and laundry)   Method of Entry Stairs   Number of Stairs in Home 12   In Home Hand Rail Right   First Floor Bathroom Shower;Full   First Floor Bathroom Accessibility Grab bars by toilet;Grab bars in tub/shower   Available Equipment Roller Walker  (Walkin stick)   Baseline Information   Vocation   (Retired )   Transportation    Prior Device(s) Used   (None prior to Covid dx)   Prior IADL Participation   Money Management Manage Checkbook;Combine Bills  (Completed with wife)   Meal Preparation Partial Participation  (Completed with wife)   Laundry Partial Participation  (Pt reported wife completes)   Home Cleaning   (Completed with wife)   Prior Level of Function   Self-Care 3. Independent - Patient completed the activities by him/herself, with or without an assistive device, with no assistance from a helper.   Indoor-Mobility (Ambulation) 3. Independent - Patient completed the activities by him/herself, with or without an assistive device, with no assistance from a helper.   Stairs 3. Independent - Patient completed the activities by him/herself, with or without an assistive device, with no assistance from a helper.   Prior Device Used Z. None of the above   Falls in the Last Year   Number of falls in the past 12 months 0   Psychosocial   Psychosocial (WDL) WDL   Patient Behaviors/Mood Calm;Cooperative "   Restrictions/Precautions   Precautions Bed/chair alarms;Fall Risk;Supervision on toilet/commode;Visual deficit  (R eye macular degeneration)   Pain Assessment   Pain Assessment Tool 0-10   Pain Score No Pain   Oral Hygiene   Reason if not Attempted   (Pt reported completed earlier this date)   Grooming   Able To Initiate Tasks;Comb/Brush Hair;Wash/Dry Face   Limitation Noted In Coordination;Safety;Sequencing;Strength   Tub/Shower Transfer   Limitations Noted In Balance;Coordination;Endurance;ROM;Safety;Sequencing;UE Strength;LE Strength   Adaptive Equipment Grab Bars;Seat with out Back   Assessed Shower   Findings Up to Min/Mod A   Shower/Bathe Self   Type of Assistance Needed Physical assistance   Physical Assistance Level 26%-50%   Shower/Bathe Self CARE Score 3   Bathing   Assessed Bath Style Shower   Anticipated D/C Bath Style Shower;Sponge Bath   Able to Gather/Transport No   Able to Adjust Water Temperature No   Able to Wash/Rinse/Dry (body part) Left Arm;Right Arm;L Upper Leg;R Upper Leg;Chest;Abdomen;Perineal Area;Buttocks   Limitations Noted in Balance;Coordination;Endurance;ROM;Safety;Sequencing;Strength   Positioning Seated;Standing   Adaptive Equipment Shower Bars;Shower Seat;Hand Held Shower   Dressing/Undressing Clothing   Remove UB Clothes Pullover Shirt;Jacket   Don UB Clothes Pullover Shirt   Type of Assistance Needed Physical assistance   Physical Assistance Level 25% or less   Upper Body Dressing CARE Score 3   Remove LB Clothes Pants;Socks;TEDs   Don LB Clothes Pants;Socks   Type of Assistance Needed Physical assistance   Physical Assistance Level 26%-50%   Comment Assist to thread LE through pants   Lower Body Dressing CARE Score 3   Limitations Noted In Balance;Coordination;Endurance   Putting On/Taking Off Footwear   Type of Assistance Needed Physical assistance   Physical Assistance Level 51%-75%   Comment Pt doffed socks and shoes with up to Min A with R sock; Assist to don B/L socks    Putting On/Taking Off Footwear CARE Score 2   Toileting Hygiene   Reason if not Attempted   (Trialed toliet transfer however Pt reported did not need to void)   Toilet Transfer   Surface Assessed Raised Toilet   Transfer Technique Standard  (RW)   Limitations Noted In Balance;Endurance;ROM;Safety;Sequencing;UE Strength;LE Strength   Adaptive Equipment Grab Bar;Walker   Type of Assistance Needed Incidental touching   Toilet Transfer CARE Score 4   Sit to Stand   Type of Assistance Needed Physical assistance;Verbal cues   Physical Assistance Level 25% or less   Comment Min A; RW   Sit to Stand CARE Score 3   Comprehension   Comprehension (FIM) 6 - Understands complex/abstract but requires  glasses for visual comp   Expression   QI: Expression 4. Express complex messages without difficulty and with speech that is clear and easy to understan   Expression (FIM) 7 - Expresses complex/abstract ideas in a reasonable time w/o devices or helper.   Problem Solving   Problem solving (FIM) 6 - Solves complex problems BUT requires extra time   RUE Assessment   RUE Assessment X  (Grossly 3/5)   LUE Assessment   LUE Assessment WFL  (Grossly 4/5)   Coordination   Movements are Fluid and Coordinated 0   Sensation   Light Touch Partial deficits in the RUE   Cognition   Overall Cognitive Status WFL   Arousal/Participation Alert;Responsive;Cooperative   Attention Within functional limits   Orientation Level Oriented X4   Memory Within functional limits   Following Commands Follows all commands and directions without difficulty   Vision   Vision Comments R eye visual deficits with Pt reported macular degeneration; L eye recent cataract sx   Perception   Inattention/Neglect Appears intact   Discharge Information   Impressions Pt is a 78 year old male presented for Occupational Therapy Evaluation with s/p CVA and recent COVID dx. Per Pt  report and chart review, Pt initally presented to [facility] on 9/29 with  shortness of breath; Pt with recent COVID dx and hospitalization; Noted limitations with R LE coordinaiton as of 10/1 with neurology evaluated and stroke recommended. Pt with comorbidities that may impact Pt perfomrnace can include DM, HTN, sepsis, hyponatremia, COPD, GERD, left foot neuropathy, asthma, pulmonary nodule, pneumothorax. Pt reported PLOF Independent with ADL,  and mobility overall without AD however with intermittent use of walking stick while outside. Pt with noted limitations with overall weakness, activity tolerence, balance and with R sided limitied strength. At this time Pt required assist with self care routine with up to Min A with UE dressing, up to Mod/Max with LE dressing and up to Min A with STS transfers. Pt indicated overall good motivation to participate with therapy services. Pt would benefit from participation with OT services to address barreirs with liitations with strength, endurance, balance and overall safety for use with ADL routines, functional transfers and mobility and to help support progress towards PLOF and safe discharge.   OT Therapy Minutes   OT Time In 1035   OT Time Out 1215   OT Total Time (minutes) 100   OT Mode of treatment - Individual (minutes) 100

## 2024-10-05 NOTE — ASSESSMENT & PLAN NOTE
Lab Results   Component Value Date    HGBA1C 10.6 (H) 10/02/2024       Recent Labs     10/04/24  0731 10/04/24  1124 10/04/24  1618 10/04/24  2114   POCGLU 90 225* 199* 334*     Uncontrolled type 2 diabetes with A1C most recently of 10.6  Restarting home glipizide per IM and stopping Lantus  Wife to bring in Home metformin XL 750mg pills, on 500mg here for now  SSI and QID glucose checks  CCD

## 2024-10-05 NOTE — PROGRESS NOTES
Physical Medicine and Rehabilitation Progress Note  Gold Van 78 y.o. male MRN: [id number]  Unit/Bed#: [facility] 216-01 Encounter: [id number]    Chief Complaint:  Already feels he is improving.    Interval History: No acute events overnight. Wife present at bedside. He states that it's odd because he feels he is improving, and feels he can get up himself to go to the bathroom, but also is concerned that he may fall if he does so. He is asking for help appropriately. Wife reinforced the same. Wife brought in his inhaler from home and some other medications as well. His BG has been high at times, but usually low in the AM. She brought in his home XR Metformin 750mg. He had some constipation but last BM 10/3.     Assessment/Plan - Continue plan of care as per primary attending, unless otherwise stated below:      CVA: PT/OT/SLP. DAPT, Statin. Outpatient f/u with Neuro  Neuropathy: Not uncomfortable. Monitor.  Constipation: Had a BM today. Currently not on a regimen, but has PRNs ordered  Hyponatremia: Na 133. Currently asymptomatic. Recheck on 10/7.   T2DM: IM resuming home dose of Metformin tomorrow, as BG has been quite high (he is on steroids as well). CDI/Accuchecks  COPD: Resume home inhalers. Continue singulair, nebs monitor respiratory status.      Ashley de Padua, MD  Physical Medicine and Rehabilitation      Review of Systems: A 10 point review of systems was negative except for what is noted in the HPI.        Current Facility-Administered Medications:     acetaminophen (TYLENOL) tablet 650 mg, 650 mg, Oral, Q6H PRN, Annie Barbosanall, PA-C    albuterol (PROVENTIL HFA,VENTOLIN HFA) inhaler 2 puff, 2 puff, Inhalation, Q4H PRN, Annie BROWN Dagnall, PA-C    albuterol inhalation solution 2.5 mg, 2.5 mg, Nebulization, Q4H PRN, Annie Barbosanall, PA-C    amLODIPine (NORVASC) tablet 5 mg, 5 mg, Oral, BID, Annie Martines, PA-C, 5 mg at 10/05/24 0842    aspirin chewable tablet 81 mg, 81 mg, Oral, Daily, Annie L  Dagnall, PA-C, 81 mg at 10/05/24 0840    atenolol (TENORMIN) tablet 25 mg, 25 mg, Oral, Daily, Annie STEPHANIE Dagnall, PA-C, 25 mg at 10/05/24 0844    atorvastatin (LIPITOR) tablet 40 mg, 40 mg, Oral, QPM, Annie STEPHANIE Dagnall, PA-C, 40 mg at 10/04/24 1728    benzonatate (TESSALON PERLES) capsule 100 mg, 100 mg, Oral, TID PRN, Annie Barbosanall, PA-C    Budeson-Glycopyrrol-Formoterol 160-9-4.8 MCG/ACT AERO 2 puff, 2 puff, Inhalation, Q12H, Anniesamra Barbosanall, PA-C    cefpodoxime (VANTIN) tablet 400 mg, 400 mg, Oral, BID With Meals, Annie L Dagnall, PA-C, 400 mg at 10/05/24 0844    clopidogrel (PLAVIX) tablet 75 mg, 75 mg, Oral, Daily, Annie STEPHANIE Barbosanall, PA-C, 75 mg at 10/05/24 0840    enoxaparin (LOVENOX) subcutaneous injection 40 mg, 40 mg, Subcutaneous, Daily, Anniesamra Barbosanall, PA-C, 40 mg at 10/05/24 0839    famotidine (PEPCID) tablet 20 mg, 20 mg, Oral, BID, Annie STEPHANIE Barbosanall, PA-C, 20 mg at 10/05/24 0839    glipiZIDE (GLUCOTROL XL) 24 hr tablet 2.5 mg, 2.5 mg, Oral, BID With Meals, Anniesamra Barbosanall, PA-C, 2.5 mg at 10/05/24 0842    guaiFENesin (MUCINEX) 12 hr tablet 600 mg, 600 mg, Oral, BID, Annie STEPHANIE Dagnall, PA-C, 600 mg at 10/05/24 0839    insulin lispro (HumALOG/ADMELOG) 100 units/mL subcutaneous injection 1-6 Units, 1-6 Units, Subcutaneous, HS, Annie STEPHANIE Dagnall, PA-C, 5 Units at 10/04/24 2123    insulin lispro (HumALOG/ADMELOG) 100 units/mL subcutaneous injection 2-12 Units, 2-12 Units, Subcutaneous, TID AC, 2 Units at 10/04/24 1658 **AND** Fingerstick Glucose (POCT), , , 4x Daily AC and at bedtime, Annie L Dagnall, PA-C    levalbuterol (XOPENEX) inhalation solution 1.25 mg, 1.25 mg, Nebulization, TID, Annie L Dagnall, PA-C, 1.25 mg at 10/04/24 2037    lisinopril (ZESTRIL) tablet 20 mg, 20 mg, Oral, BID, Annie L Dagnall, PA-C, 20 mg at 10/05/24 0840    LORazepam (ATIVAN) tablet 0.5 mg, 0.5 mg, Oral, HS PRN, Annie L Dagnall, PA-C, 0.5 mg at 10/04/24 2124    metFORMIN (GLUCOPHAGE-XR) 24 hr tablet 500  mg, 500 mg, Oral, Daily With Dinner, Annie BROWN Dagnall, PA-C, 500 mg at 10/04/24 1603    montelukast (SINGULAIR) tablet 10 mg, 10 mg, Oral, HS, Annie BROWN Dagnall, PA-C, 10 mg at 10/04/24 2124    [START ON 10/6/2024] predniSONE tablet 15 mg, 15 mg, Oral, Every Other Day, Annie BROWN Dagnall, PA-C    predniSONE tablet 5 mg, 5 mg, Oral, Every Other Day, Annie L Dagnall, PA-C, 5 mg at 10/05/24 0841    sodium chloride 3 % inhalation solution 4 mL, 4 mL, Nebulization, TID, Annie BROWN Dagnall, PA-C, 4 mL at 10/04/24 2037    Physical Exam:  Temp:  [97.5 °F (36.4 °C)-98.3 °F (36.8 °C)] 98.3 °F (36.8 °C)  HR:  [65-94] 65  Resp:  [17-18] 17  BP: (119-127)/(59-63) 127/59  SpO2:  [94 %-97 %] 94 %      Gen: No acute distress, Well-nourished, well-appearing.  HEENT: Moist mucus membranes, Normocephalic/Atraumatic  Cardiovascular:  Distal pulses palpable  Heme/Extr: No edema  Pulmonary: Non-labored breathing. Lungs CTAB  : No schmitt  GI: Soft, non-tender, non-distended.  Integumentary: Skin is warm, dry.   Neuro: AAOx3, Speech is intact. Appropriate to questioning.  Psych: Normal mood and affect.     Laboratory:  Labs reviewed  Results from last 7 days   Lab Units 10/05/24  0533 10/03/24  0427 10/02/24  0424   HEMOGLOBIN g/dL 11.7* 12.0 11.1*   HEMATOCRIT % 35.2* 36.5 33.9*   WBC Thousand/uL 8.78 9.70 8.14     Results from last 7 days   Lab Units 10/05/24  0533 10/03/24  0427 10/02/24  0424 09/30/24  0448 09/29/24  1022   BUN mg/dL 23 21 25   < > 23   SODIUM mmol/L 133* 134* 132*   < > 134*   POTASSIUM mmol/L 3.9 4.0 4.3   < > 4.3   CHLORIDE mmol/L 103 101 102   < > 98   CREATININE mg/dL 0.83 0.91 0.84   < > 1.07   AST U/L 13  --   --   --  13   ALT U/L 21  --   --   --  21    < > = values in this interval not displayed.     Results from last 7 days   Lab Units 09/29/24  1022   PROTIME seconds 13.8   INR  1.01        Diagnostic Studies: Reviewed, no new imaging   No orders to display         ** Please Note: Fluency Direct voice to  text software may have been used in the creation of this document. **

## 2024-10-05 NOTE — ASSESSMENT & PLAN NOTE
MRI revealed multiple foci of acute infarcts in the bilateral frontoparietal cortices and subcortical white matter left > right  Placed on DAPT and statin for secondary stroke prophylaxis per neurology and follow up with Neuro in 6 weeks (DAPT for 6 months)  Etiology thought to be coagulopathy secondary to COVID infection  CT CAP completed to rule out malignancy  TTE showing 50% EF but posterior wall hypokinesis and will need cardiology follow up  Patient endorses improvement in right sided weakness. Has ongoing dysmetria and incoordination in the right nahomi body  Physical, Occupational, and Speech therapy

## 2024-10-05 NOTE — TREATMENT PLAN
Individualized Plan of Care - [facility] [place]  Gold Van 78 y.o. male MRN: [id number]  Unit/Bed#: [facility] 216-01 Encounter: [id number]     PATIENT INFORMATION  ADMISSION DATE: 10/4/2024  1:24 PM JUAN MIGUEL CATEGORY:Stroke:  01.9 Other Stroke   ADMISSION DIAGNOSIS: Multiple lacunar infarcts (HCC) [I63.81]  EXPECTED LOS: 10 to 14 days     MEDICAL/FUNCTIONAL PROGNOSIS  Based on my assessment of the patient's medical conditions and current functional status, the prognosis for attaining medical and functional goals or the IRF stay is:  Good    Medical Goals: Patient will be medically stable for discharge to [facility] upon completion of rehab program and Patient will be able to manage medical conditions and comorbid conditions with medications and follow up upon completion of rehab program    Cardiopulmonary function: Ensure cardiopulmonary stability and optimize cardiopulmonary function not only at rest but with activity as patient's activity level significantly increases in acute rehab compared with prior to transfer in preparation for safe discharge from [facility].  Must closely and frequently monitor blood pressure and HR to ensure adequate cardiac output during ADLs and ambulation as patient is at increased risk for orthostatic hypotension/syncope and potential injury if not monitored for and managed adequately.    Blood pressure management:    Frequent monitoring of blood pressure with appropriate adjustments in blood pressure medication management to optimize blood pressure control and prevent/limit renal complications.   Monitoring impact of blood pressure and side-effects of blood pressure medications at rest and with activity.  Hypoxia prevention: Ensure appropriate level of oxygenation at rest and with activity to avoid symptomatic hypoxia, maximize functional performance, and decrease risk of atelectasis/pneumonia through close and frequent monitoring, providing appropriate respiratory  treatments (such as incentive spirometry), and when necessary provide/adjust respiratory medications.    DM: Patient will improve/maintain blood sugar control to ensure optimal healing and decrease risks of complications associated with DM through medication monitoring, adjustments as indicated, and optimal dietary intake and education.  Pain management:  Pain will improve with frequent evaluation of pain, careful adjustments in medications, frequent re-evaluation of patient's pain and medical/neurologic status to ensure optimal pain control, avoidance of potential serious and even life-threatening side-effects and drug interactions, as well as weaning pain medications as soon as possible to decrease risk of short and long-term use.     Neurologic Disorder: peripheral neuropathy, foot drop causing impaired mobility, ADLs, and gait:  intensive skilled therapies with physical therapy and occupational therapy with close oversight and management by rehab specialized physician in acute rehabilitation setting to most expeditiously and effectively improve functional mobility, transfers, upper and lower body strengthening, conditioning, balance, and gait training with appropriate assistive device.  Patient will have optimal supervision and management of patient's underlying neurologic disorder with specialized rehabilitation physician during this period of recovery to ensure most expeditious and optimal recovery with decreased risks of fall/injury and other complications including acute worsening of neuro disorder, decrease risk of VTE, PNA, and skin ulceration.  Stroke:  Intensive skilled therapies with physical therapy and occupational therapy with close oversight and management by rehab specialized physician in acute rehabilitation setting to most expeditiously and effectively improve functional mobility, transfers, upper and lower body strengthening, conditioning, balance, and gait training with appropriate assistive  device.  Patient will have optimal blood pressure management and blood sugar control.  Prevent/decrease risk of VTE, atelectasis, pneumonia, skin ulceration, fall, other injury (and when applicable shoulder subluxation, chronic shoulder pain, plantarflexion contracture)  Inpatient rehabilitation education/teaching:  To be provided to patient and typically family/caregiver (if able to be identified) by all skilled therapists, rehab nursing, case management, and rehab specialized physician to ensure optimal recovery and decrease risks of complications in both acute rehabilitation setting as well as after discharge.   Electrolyte abnormality: hyponatremia:  placing patient at risk for additional complications which may impact medical stability and functional recovery.  Frequent measurement and monitoring of labs by with appropriate adjustments in medication and/or fluid management by rehabilitation physician and internal medicine consultant.    Bladder dysfunction:  Appropriate bladder management with appropriate toileting program from rehab nursing and staff with oversight management by rehabilitation physicians which include appropriate monitoring and possible adjustments in medications to with goals to optimize bladder function and decrease risk of bladder retention, incontinence, and urinary tract infection.   Bowel dysfunction: Appropriate bowel management with appropriate toileting program from rehab nursing and staff with oversight management by rehabilitation physicians which include adjustments in medications to optimize appropriate bowel function and prevent/decrease risk of constipation and bowel obstruction.        ANTICIPATED DISCHARGE DISPOSITION AND SERVICES  COMMUNITY SETTING: Home - independent/modified independent    ANTICIPATED FOLLOW-UP SERVICE:   Outpatient Therapy Services: PT, OT, and SLP        DISCIPLINE SPECIFIC PLANS:  Required Disciplines & Services: Rehabillitation Nursing, Case Management,  Diabetes Education, respiratory therapy, and Psychology      REQUIRED THERAPY:  Therapy Hours per Day Days per Week   Physical Therapy 1-2 5-6   Occupational Therapy 1-2 5-6   Speech/Language Therapy 1-2 3-5   NOTE: Additional therapy time(s) or changes to allocation of therapies as appropriate to meet patient needs and to achieve functional goals.      Patient will participate in above therapy regimen consisting of PT, OT, and SLP due to the following medical procedure/condition:Stroke:  01.9 Other Stroke    ANTICIPATED FUNCTIONAL OUTCOMES:  ADL:  Modified independent to set up   Bladder/Bowel:  Modified independent to set up   Transfers:  Modified independent   Locomotion:  Modified independent for household distances   Cognitive:  set up to independent     DISCHARGE PLANNING NEEDS  Equipment needs: Discharge needs to be reviewed with team      REHAB ANTICIPATED PARTICIPATION RESTRICTIONS:  Amubulate Short Distances Only, Assist with Mobility, Decreaed Safety Awareness, Inability to Drive, Rquires Assist with ADLS, Requires Assit with Homemaking, and Requires Assit with Steps    Brandon Munoz,   Physical Medicine and Rehabilitation  [facility]

## 2024-10-05 NOTE — PROGRESS NOTES
10/05/24 0700   Patient Data   Rehab Impairment Rehab Diagnosis: Impairment of mobility, safety, Activities of Daily Living (ADLs), and cognitive/communication skills due to Stroke:  01.9 Other Stroke     Etiologic: Multiple foci of acute infarcts involving bilateral frontoparietal cortices and subcortical white matter (left greater than right)   Etiologic Diagnosis Per EMR:  Gold Van is a 78 y.o. male with history of hypertension, type 2 diabetes, COPD, chronic steroid use who presented to the [facility] initially on 9/29/2024 for shortness of breath.  Patient with a history of COPD and asthma and recent COVID infection with hospitalization.  He arrived via ambulance with a CPAP on and then required a transition to BiPAP in the ER.  He was initiated on IV cefepime for suspected pneumonia and also IV steroids.  He met sepsis criteria for the tachycardia and leukocytosis in the setting of dental pneumonia.  He had some difficulty with coordination of his right leg during a PT session as of 10/1 and neurology evaluated the patient and recommended stroke.  An MRI of the brain did note multiple acute infarcts in the bilateral frontoparietal cortices and subcortical white matter changes without evidence of mass effect or hemorrhagic transformation.  Patient was started on dual antiplatelet therapy and statin and additionally had a CT of the chest abdomen pelvis to rule out other causes of hypercoagulability, stroke, malignancy.  Hypercoagulability it was thought to be related to his prior COVID infection.  He was on telemetry without any issues and no atrial fibrillation noted.  He did have a TTE that noted some wall motion abnormality in the posterior sections.  Plan for outpatient monitor and stress test.  Pulmonology also following the patient and he continued with cefepime and azithromycin and is discharging on 2 additional days for coverage of Pseudomonas.  This was grown in the  sputum and was recommended by pulmonology.   Support System   Name Stacia   Relationship Spouse   Home Setup   Type of Home Split Level   Home Modification Comment [address], enters through garage up 6+7 steps with single HR on right going up.  Solid wooden stairs approx 4 feet wide.  Once up those stairs, patient is on single living area to include bathroom, bedroom, living area, kitchen, and bedroom.  Laundry area in on this level as well.  Two full bathrooms.   Available Equipment Roller Walker   Baseline Information   Vocation   (Retired , going to the gym 2 hours per day for 3 days per week.)   Transportation    Prior Device(s) Used   (None prior to covid dx.)   Prior Level of Function   Indoor-Mobility (Ambulation) 3. Independent - Patient completed the activities by him/herself, with or without an assistive device, with no assistance from a helper.   Stairs 3. Independent - Patient completed the activities by him/herself, with or without an assistive device, with no assistance from a helper.   Prior Device Used Z. None of the above   Falls in the Last Year   Number of falls in the past 12 months 0   Psychosocial   Psychosocial (WDL) WDL   Restrictions/Precautions   Precautions Bed/chair alarms;Fall Risk;Supervision on toilet/commode;Visual deficit  (Vision - macular degeneration right eye)   Pain Assessment   Pain Assessment Tool 0-10   Pain Score No Pain   Transfer Bed/Chair/Wheelchair   Positioning Concerns   (Right sided weakness and limited coordination, possible right sided visual attention)   Limitations Noted In Balance;Coordination;Endurance;UE Strength;LE Strength;Vision;Sequencing   Type of Assistance Needed Physical assistance   Physical Assistance Level 51%-75%   Comment min-mod A, transfer to left side   Chair/Bed-to-Chair Transfer CARE Score 2   Roll Left and Right   Type of Assistance Needed Incidental touching   Physical Assistance Level No physical  assistance   Comment CGA, no side rails involved   Roll Left and Right CARE Score 4   Sit to Lying   Comment pt remains OOB   Lying to Sitting on Side of Bed   Type of Assistance Needed Physical assistance   Physical Assistance Level 25% or less   Comment min A, no side rails   Lying to Sitting on Side of Bed CARE Score 3   Sit to Stand   Type of Assistance Needed Physical assistance   Physical Assistance Level 25% or less   Comment min A, RW   Sit to Stand CARE Score 3   Picking Up Object   Reason if not Attempted Safety concerns   Picking Up Object CARE Score 88   Car Transfer   Comment Typically drives a [place]    Reason if not Attempted Environmental limitations   Car Transfer CARE Score 10   Ambulation   Primary Mode of Locomotion Prior to Admission Walk   Distance Walked (feet) 95 ft   Assist Device Roller Walker   Gait Pattern Ataxic;Inconsistant Marsha;Decreased foot clearance;R foot drag;Shuffle   Limitations Noted In Balance;Coordination;Device Management;Heel Strike;Endurance;Posture;Safety;Strength   Provided Assistance with: Balance;Trunk Support   Walk Assist Level Moderate Assist   Findings Poor right foot clearance, max VCs for hip flexion and heel strike   Walk 10 Feet   Type of Assistance Needed Physical assistance   Physical Assistance Level 51%-75%   Comment RW, mod A   Walk 10 Feet CARE Score 2   Walk 50 Feet with Two Turns   Type of Assistance Needed Physical assistance   Physical Assistance Level 51%-75%   Comment mod A, RW   Walk 50 Feet with Two Turns CARE Score 2   Walk 150 Feet   Comment Fatigue limited   Reason if not Attempted Safety concerns   Walk 150 Feet CARE Score 88   Walking 10 Feet on Uneven Surfaces   Comment Fatigue limited   Reason if not Attempted Safety concerns   Walking 10 Feet on Uneven Surfaces CARE Score 88   Wheelchair mobility   Type of Wheelchair Used 1. Manual   Method Right upper extremity;Left upper extremity;Right lower extremity;Left lower extremity    Assistance Provided For Locking Brakes;Obstacles;Remove Leg Rest;Replace Leg Rest   Distance Level Surface (feet) 150 ft   Distance Wheeled 3% Grade 0 ft   Wheel 50 Feet with Two Turns   Type of Assistance Needed Supervision   Physical Assistance Level No physical assistance   Wheel 50 Feet with Two Turns CARE Score 4   Wheel 150 Feet   Type of Assistance Needed Supervision   Physical Assistance Level No physical assistance   Wheel 150 Feet CARE Score 4   Curb or Single Stair   Reason if not Attempted Safety concerns   1 Step (Curb) CARE Score 88   4 Steps   Reason if not Attempted Safety concerns   4 Steps CARE Score 88   12 Steps   Reason if not Attempted Safety concerns   12 Steps CARE Score 88   RLE Assessment   RLE Assessment X   Strength RLE   R Hip Flexion 3/5   R Hip Extension 3/5   R Hip ABduction 3-/5   R Hip ADduction 3/5   R Knee Flexion 3/5   R Knee Extension 3+/5   R Ankle Dorsiflexion 3/5   R Ankle Plantar Flexion 3/5   Tone RLE   RLE Tone Hypotonic   LLE Assessment   LLE Assessment   (Grossly 3+/5 throughout hip, 4-/5 knee extn; 3/5 knee flexion, 4/5 DF and PF)   Coordination   Movements are Fluid and Coordinated 0   Coordination and Movement Description Slow ADRIEN, decreased heel shin   Sensation   Light Touch Partial deficits in the RUE  (Mild numbness)   Propioception Partial deficits in the RUE;Partial deficits in the RLE   Cognition   Overall Cognitive Status WFL   Orientation Level Oriented X4   Vision   Vision Comments Right eye deficits, recent left eye sx for cataract removal   Objective Measure   PT Measure(s) TUG - 52 seconds, RW   Therapeutic Exercise   Therapeutic Exercise/Activity Seated LE TE introduced   Discharge Information   Vocational Plan Retired/not working   Impressions Pt is 78 year old male seen for PT evaluation on 10/5/24 s/p admit to [facility] on 10/4/24 w/ CVA and s/p Covid.  Orders placed at admission.  Performed at least 2 patient identifiers during session: Name  and wristband. Comorbidities affecting pt's physical performance at time of assessment include: DM, HTN, sepsis, hyponatremia, COPD, GERD, left foot neuropathy, asthma, pulmonary nodule, pneumothorax. LOF prior to admission was independent, (+) . Personal factors affecting pt at time of IE include: weakness, limited endurance, decreased right UE/LE strength, decreased balance, limited flexibility. Please find objective findings from PT assessment regarding body systems outlined above with impairments and limitations including weakness, impaired balance, decreased endurance, pain, decreased activity tolerance and fall risk, as well as mobility assessment.  Pt's clinical presentation warrants participation in multidisciplinary acute rehab program at 3 or greater hours of therapy per day. Pt to benefit from continued PT tx to address deficits as defined above and maximize level of functional independent mobility and consistency.  PT for improved safe return home with maximized functional mobility.   PT Therapy Minutes   PT Time In 0700   PT Time Out 0830   PT Total Time (minutes) 90   PT Mode of treatment - Individual (minutes) 90   PT Mode of treatment - Concurrent (minutes) 0   PT Mode of treatment - Group (minutes) 0   PT Mode of treatment - Co-treat (minutes) 0   PT Mode of Treatment - Total time(minutes) 90 minutes   PT Cumulative Minutes 90   Cumulative Minutes   Cumulative therapy minutes 90     Patient remains OOB in chair, all needs in reach.  Alarm in place and activated.  Set up for meal.  Encouraged use of call bell, patient verbalizes understanding.

## 2024-10-05 NOTE — ASSESSMENT & PLAN NOTE
Patient states he has had issues with the foot for some time now  Presents with 1/5 ankle dorsiflexor strength on the left  Eval for bracing

## 2024-10-05 NOTE — ASSESSMENT & PLAN NOTE
Had TTE with posterior wall abnormalities/mild hypokinesis noted. EF 50%  Follow up with cardiology as an outpatient, would benefit from monitor and stress test

## 2024-10-05 NOTE — ASSESSMENT & PLAN NOTE
Multiple years history of generalized pruritus with extensive work up in past  On alternating prednisone doses  Monitor for any changes

## 2024-10-05 NOTE — ASSESSMENT & PLAN NOTE
Presented with shortness of breath and was in the hospital 9/24/24 for acute respiratory symptoms related to infection  Received remdesivir and baricitinib  Felt that strokes related COVID coagulopathy

## 2024-10-05 NOTE — ASSESSMENT & PLAN NOTE
Neuropathy in feet and lower 1/3 of the tibia bilaterally  Undiagnosed previously, but to consider in therapies  Likely related to diabetes  Consider gabapentin, however not uncomfortable at this time

## 2024-10-05 NOTE — ASSESSMENT & PLAN NOTE
Met sepsis criteria on admission with tahcycardia, leukocytosis. Lactic acidosis. Also with acute repsiratory failure requiring CPAP-> BiPAP and eventually nasal canula  Blood cultures felt to be contaminants  CXR with left base opacity atelectasis vs pneumonia  S/P IV Ceftriaxone and azithromycin for 4 days  Sputum C&S was positive for Pseudomonas-received cefepime/Azithromycin while inpatient, transitioning to Vantin for total of 2 more days (5 days course of treatment as recommended by pulmonology)   Pulm followed in acute care  IV steroids weaned and now on prior chronic prednisone regimen

## 2024-10-06 LAB
GLUCOSE SERPL-MCNC: 120 MG/DL (ref 65–140)
GLUCOSE SERPL-MCNC: 270 MG/DL (ref 65–140)
GLUCOSE SERPL-MCNC: 337 MG/DL (ref 65–140)
GLUCOSE SERPL-MCNC: 94 MG/DL (ref 65–140)

## 2024-10-06 PROCEDURE — 97537 COMMUNITY/WORK REINTEGRATION: CPT

## 2024-10-06 PROCEDURE — 97542 WHEELCHAIR MNGMENT TRAINING: CPT

## 2024-10-06 PROCEDURE — 82948 REAGENT STRIP/BLOOD GLUCOSE: CPT

## 2024-10-06 PROCEDURE — 97110 THERAPEUTIC EXERCISES: CPT

## 2024-10-06 PROCEDURE — 97530 THERAPEUTIC ACTIVITIES: CPT

## 2024-10-06 PROCEDURE — 97116 GAIT TRAINING THERAPY: CPT

## 2024-10-06 RX ORDER — METFORMIN HYDROCHLORIDE 750 MG/1
750 TABLET, EXTENDED RELEASE ORAL
Status: DISCONTINUED | OUTPATIENT
Start: 2024-10-06 | End: 2024-10-17 | Stop reason: HOSPADM

## 2024-10-06 RX ORDER — POLYETHYLENE GLYCOL 3350 17 G/17G
17 POWDER, FOR SOLUTION ORAL DAILY PRN
Status: DISCONTINUED | OUTPATIENT
Start: 2024-10-06 | End: 2024-10-17 | Stop reason: HOSPADM

## 2024-10-06 RX ADMIN — FAMOTIDINE 20 MG: 20 TABLET, FILM COATED ORAL at 09:23

## 2024-10-06 RX ADMIN — SODIUM CHLORIDE SOLN NEBU 3% 4 ML: 3 NEBU SOLN at 09:11

## 2024-10-06 RX ADMIN — BUDESONIDE, GLYCOPYRROLATE, AND FORMOTEROL FUMARATE 2 PUFF: 160; 9; 4.8 AEROSOL, METERED RESPIRATORY (INHALATION) at 20:24

## 2024-10-06 RX ADMIN — GLIPIZIDE 2.5 MG: 2.5 TABLET, EXTENDED RELEASE ORAL at 09:31

## 2024-10-06 RX ADMIN — BUDESONIDE, GLYCOPYRROLATE, AND FORMOTEROL FUMARATE 2 PUFF: 160; 9; 4.8 AEROSOL, METERED RESPIRATORY (INHALATION) at 09:40

## 2024-10-06 RX ADMIN — GLIPIZIDE 2.5 MG: 2.5 TABLET, EXTENDED RELEASE ORAL at 16:34

## 2024-10-06 RX ADMIN — ATORVASTATIN CALCIUM 40 MG: 40 TABLET, FILM COATED ORAL at 18:07

## 2024-10-06 RX ADMIN — FAMOTIDINE 20 MG: 20 TABLET, FILM COATED ORAL at 18:07

## 2024-10-06 RX ADMIN — ATENOLOL 25 MG: 25 TABLET ORAL at 09:23

## 2024-10-06 RX ADMIN — INSULIN LISPRO 10 UNITS: 100 INJECTION, SOLUTION INTRAVENOUS; SUBCUTANEOUS at 11:48

## 2024-10-06 RX ADMIN — MONTELUKAST 10 MG: 10 TABLET, FILM COATED ORAL at 21:00

## 2024-10-06 RX ADMIN — ENOXAPARIN SODIUM 40 MG: 40 INJECTION SUBCUTANEOUS at 09:40

## 2024-10-06 RX ADMIN — AMLODIPINE BESYLATE 5 MG: 5 TABLET ORAL at 09:24

## 2024-10-06 RX ADMIN — ALBUTEROL SULFATE 2.5 MG: 2.5 SOLUTION RESPIRATORY (INHALATION) at 09:11

## 2024-10-06 RX ADMIN — CLOPIDOGREL 75 MG: 75 TABLET ORAL at 09:23

## 2024-10-06 RX ADMIN — SODIUM CHLORIDE SOLN NEBU 3% 4 ML: 3 NEBU SOLN at 16:13

## 2024-10-06 RX ADMIN — GUAIFENESIN 600 MG: 600 TABLET ORAL at 09:23

## 2024-10-06 RX ADMIN — METFORMIN HYDROCHLORIDE 750 MG: 750 TABLET, EXTENDED RELEASE ORAL at 16:33

## 2024-10-06 RX ADMIN — LEVALBUTEROL HYDROCHLORIDE 1.25 MG: 1.25 SOLUTION RESPIRATORY (INHALATION) at 19:50

## 2024-10-06 RX ADMIN — LEVALBUTEROL HYDROCHLORIDE 1.25 MG: 1.25 SOLUTION RESPIRATORY (INHALATION) at 16:12

## 2024-10-06 RX ADMIN — LISINOPRIL 20 MG: 20 TABLET ORAL at 09:23

## 2024-10-06 RX ADMIN — LISINOPRIL 20 MG: 20 TABLET ORAL at 20:24

## 2024-10-06 RX ADMIN — ASPIRIN 81 MG 81 MG: 81 TABLET ORAL at 09:24

## 2024-10-06 RX ADMIN — SODIUM CHLORIDE SOLN NEBU 3% 4 ML: 3 NEBU SOLN at 19:50

## 2024-10-06 RX ADMIN — PREDNISONE 15 MG: 5 TABLET ORAL at 09:22

## 2024-10-06 RX ADMIN — INSULIN LISPRO 6 UNITS: 100 INJECTION, SOLUTION INTRAVENOUS; SUBCUTANEOUS at 16:33

## 2024-10-06 RX ADMIN — GUAIFENESIN 600 MG: 600 TABLET ORAL at 18:07

## 2024-10-06 RX ADMIN — AMLODIPINE BESYLATE 5 MG: 5 TABLET ORAL at 20:24

## 2024-10-06 RX ADMIN — CEFPODOXIME PROXETIL 400 MG: 200 TABLET, FILM COATED ORAL at 09:31

## 2024-10-06 NOTE — PROGRESS NOTES
Progress Note - Gold Van 78 y.o. male MRN: [id number]    Unit/Bed#: [facility] 216-01 Encounter: [id number]        Subjective:   Patient seen and examined at bedside after reviewing the chart and discussing the case with the caring staff.      Patient examined at bedside.  Patient reports no acute symptoms except weakness in his right hand which is improving.    Labs were reviewed from 10/5/2024.  Patient's CMP showed sodium 133.  Patient's CBC showed hemoglobin 11.7 with hematocrit 35.2.    Physical Exam   Vitals: Blood pressure 147/68, pulse 72, temperature 97.8 °F (36.6 °C), temperature source Temporal, resp. rate 18, height 6' (1.829 m), weight 70.6 kg (155 lb 10.3 oz), SpO2 95%.,Body mass index is 21.11 kg/m².  Constitutional: He appears well-developed.   HEENT: PERR, EOMI, MMM  Cardiovascular: Normal rate and regular rhythm.    Pulmonary/Chest: Effort normal and breath sounds normal.   Abdomen: Soft, + BS, NT    Assessment/Plan:  Gold Van is a(n) 78 y.o. male with CVA.     Hypertension.  Patient is on lisinopril 20 mg twice daily, amlodipine 5 mg twice daily, atenolol 25 mg daily.  Type 2 diabetes mellitus.  Hgb A1c 10.6% on 10/2/24. Restart home glipizide 2.5 mg twice daily 10/4 and discontinue Lantus.  Wife to bring metformin  mg daily in the evening from home which will be started on 10/6/2024- on metformin  mg daily for now.  SSI with accu-checks ac/hs.  Carb controlled diet.  GERD.  Patient is on famotidine 20 mg twice daily.   Chronic generalized pruritus.  Ongoing past 5-6 years.  Pt/wife reports receiving extensive workup in the past from various specialists including dermatology and allergist.  Continue alternating prednisone 5 mg and 15 mg daily.    COPD/asthma.  Breztri brought from home.  Continue Singulair 10 mg nightly and Xopenox/sodium chloride neb TID.  [facility].    Pneumonia.  Sputum C&S on 10/1 positive for pseudomonas.  Patient was treated with  IV cefepime and then transitioned to PO Vantin to complete 5-day course of antibiotics as recommended by pulmonology.  Continue Vantin 400 mg twice daily x 4 more doses (thru 10/6), Mucinex 600 mg twice daily, Xopenox/sodium chloride neb TID.  Tessalon perles as needed.  Incentive spirometry.   Abnormal echocardiogram.  TTE on 10/1 with posterior wall motion abnormality.  Cardiology saw patient on acute care and recommended outpatient stress test.  Constipation.  I will put the patient on Senokot S daily as needed and MiraLAX as needed 10/5/2024.  Pain and bowel regimen.  As per physiatry.   CVA.   Neurology recommended DAPT with Plavix/ASA x90 days total (~12/29/24) then ASA monotherapy.  Cont atorvastatin 40 mg daily.  Follow-up with neurology in 6 weeks.  Follow-up with cardiology for Zio patch and/or loop recorder on discharge.  Patient is receiving intensive PT OT with management as per physiatry.     Anticipated date of discharge.  TBD.

## 2024-10-06 NOTE — PROGRESS NOTES
10/06/24 0930   Pain Assessment   Pain Assessment Tool 0-10   Pain Score No Pain   Restrictions/Precautions   Precautions Bed/chair alarms;Fall Risk;Supervision on toilet/commode;Visual deficit  (macular degeneration R eye)   Cognition   Overall Cognitive Status WFL   Arousal/Participation Alert;Responsive;Cooperative   Attention Within functional limits   Orientation Level Oriented X4   Memory Within functional limits   Following Commands Follows one step commands without difficulty   Sit to Stand   Type of Assistance Needed Physical assistance;Verbal cues   Physical Assistance Level 25% or less   Comment min A with RW   Sit to Stand CARE Score 3   Bed-Chair Transfer   Type of Assistance Needed Physical assistance;Verbal cues   Physical Assistance Level 25% or less   Comment min A with RW   Chair/Bed-to-Chair Transfer CARE Score 3   Walk 10 Feet   Type of Assistance Needed Physical assistance;Verbal cues   Physical Assistance Level 25% or less   Comment min A level and unlevel surfaces; improving R foot clearance   Walk 10 Feet CARE Score 3   Walk 50 Feet with Two Turns   Type of Assistance Needed Physical assistance;Verbal cues   Physical Assistance Level 25% or less   Comment min A level and unlevel surfaces; improving R foot clearance   Walk 50 Feet with Two Turns CARE Score 3   Walk 150 Feet   Type of Assistance Needed Physical assistance;Verbal cues   Physical Assistance Level 25% or less   Comment min A level and unlevel surfaces; improving R foot clearance   Walk 150 Feet CARE Score 3   Walking 10 Feet on Uneven Surfaces   Type of Assistance Needed Physical assistance;Verbal cues   Physical Assistance Level 25% or less   Comment min A level and unlevel surfaces; improving R foot clearance   Walking 10 Feet on Uneven Surfaces CARE Score 3   Ambulation   Primary Mode of Locomotion Prior to Admission Walk   Distance Walked (feet) 227 ft  (227' 64')   Assist Device Roller Walker   Gait Pattern  Ataxic;Inconsistant Marsha;Decreased foot clearance;R foot drag;Shuffle   Limitations Noted In Balance;Coordination;Device Management;Heel Strike;Endurance;Posture;Safety;Strength   Provided Assistance with: Balance;Trunk Support   Walk Assist Level Minimum Assist   Findings min A level and unlevel surfaces; improving R foot clearance   Does the patient walk? 2. Yes   Wheel 50 Feet with Two Turns   Type of Assistance Needed Supervision   Comment S/MOD I level and unlevel surfaces   Wheel 50 Feet with Two Turns CARE Score 4   Wheel 150 Feet   Type of Assistance Needed Supervision   Comment S/MOD I level and unlevel surfaces   Wheel 150 Feet CARE Score 4   Wheelchair mobility   Does the patient use a wheelchair? 1. Yes   Type of Wheelchair Used 1. Manual   Method Right upper extremity;Left upper extremity;Right lower extremity;Left lower extremity   Assistance Provided For Locking Brakes;Obstacles;Remove Leg Rest;Replace Leg Rest   Distance Level Surface (feet) 181 ft  (181' 118')   Distance Wheeled 3% Grade 24 ft   Findings S/MOD I level and unlevel surfaces   Curb or Single Stair   Style negotiated Single stair   Type of Assistance Needed Physical assistance;Verbal cues   Physical Assistance Level 25% or less   Comment min A 14 steps using 2 handrails; cues for sequence   1 Step (Curb) CARE Score 3   4 Steps   Type of Assistance Needed Physical assistance;Verbal cues   Physical Assistance Level 25% or less   Comment min A 14 steps using 2 handrails; cues for sequence   4 Steps CARE Score 3   12 Steps   Type of Assistance Needed Physical assistance;Verbal cues   Physical Assistance Level 25% or less   Comment min A 14 steps using 2 handrails; cues for sequence   12 Steps CARE Score 3   Stairs   Type Stairs;Ramp   # of Steps 14   Weight Bearing Precautions WBAT;Fall Risk   Assist Devices Bilateral Rail;Roller Walker   Findings min A 14 steps using 2 handrails; cues for sequence   Therapeutic Interventions    Strengthening seated ther ex   Balance gait and transfer training   Other ramp and stair negotiation   Assessment   Treatment Assessment Patient agreeable to therapy session.  No pain reported.   PT Family training done with: Patient agreeable to therapy session.  No pain reported.  Cues for general safety and task sequence.    Increased fatigue noted t/o therapy session.   Completed ther ex for general LE strengthening; gait and transfer training focusing on sequence and technique for improved balance and safety with functional mobility using walker.  Negotiated ramp with RW MOD A with cues for sequence; MIN A steps with 2 handrails and cues for sequence.  Patient returned to recliner with alarms on and all needs within reach.  Patient appropriate for concurrent therapy to increase motivation and encouragement among pts with similar deficits while completing therapy session.   PT Barriers   Physical Impairment Decreased strength;Decreased range of motion;Decreased endurance;Impaired balance;Decreased mobility;Decreased coordination;Decreased safety awareness;Impaired vision   Functional Limitation Wheelchair management;Walking;Transfers;Standing;Stair negotiation;Ramp negotiation;Car transfers   Plan   Treatment/Interventions Functional transfer training;LE strengthening/ROM;Elevations;Therapeutic exercise;Gait training  (wheelchair mobility)   Progress Progressing toward goals   PT Therapy Minutes   PT Time In 0930   PT Time Out 1055   PT Total Time (minutes) 85   PT Mode of treatment - Individual (minutes) 47   PT Mode of treatment - Concurrent (minutes) 38   PT Mode of treatment - Group (minutes) 0   PT Mode of treatment - Co-treat (minutes) 0   PT Mode of Treatment - Total time(minutes) 85 minutes   PT Cumulative Minutes 175   Therapy Time missed   Time missed? No

## 2024-10-07 LAB
GLUCOSE SERPL-MCNC: 140 MG/DL (ref 65–140)
GLUCOSE SERPL-MCNC: 193 MG/DL (ref 65–140)
GLUCOSE SERPL-MCNC: 292 MG/DL (ref 65–140)
GLUCOSE SERPL-MCNC: 319 MG/DL (ref 65–140)

## 2024-10-07 PROCEDURE — 97530 THERAPEUTIC ACTIVITIES: CPT

## 2024-10-07 PROCEDURE — 97112 NEUROMUSCULAR REEDUCATION: CPT

## 2024-10-07 PROCEDURE — 97116 GAIT TRAINING THERAPY: CPT

## 2024-10-07 PROCEDURE — 99232 SBSQ HOSP IP/OBS MODERATE 35: CPT | Performed by: STUDENT IN AN ORGANIZED HEALTH CARE EDUCATION/TRAINING PROGRAM

## 2024-10-07 PROCEDURE — 97110 THERAPEUTIC EXERCISES: CPT

## 2024-10-07 PROCEDURE — 82948 REAGENT STRIP/BLOOD GLUCOSE: CPT

## 2024-10-07 PROCEDURE — 97535 SELF CARE MNGMENT TRAINING: CPT

## 2024-10-07 RX ADMIN — PREDNISONE 5 MG: 5 TABLET ORAL at 08:19

## 2024-10-07 RX ADMIN — LEVALBUTEROL HYDROCHLORIDE 1.25 MG: 1.25 SOLUTION RESPIRATORY (INHALATION) at 07:52

## 2024-10-07 RX ADMIN — SODIUM CHLORIDE SOLN NEBU 3% 4 ML: 3 NEBU SOLN at 07:52

## 2024-10-07 RX ADMIN — SODIUM CHLORIDE SOLN NEBU 3% 4 ML: 3 NEBU SOLN at 19:58

## 2024-10-07 RX ADMIN — AMLODIPINE BESYLATE 5 MG: 5 TABLET ORAL at 08:20

## 2024-10-07 RX ADMIN — INSULIN LISPRO 2 UNITS: 100 INJECTION, SOLUTION INTRAVENOUS; SUBCUTANEOUS at 21:02

## 2024-10-07 RX ADMIN — GUAIFENESIN 600 MG: 600 TABLET ORAL at 08:20

## 2024-10-07 RX ADMIN — CLOPIDOGREL 75 MG: 75 TABLET ORAL at 08:20

## 2024-10-07 RX ADMIN — BUDESONIDE, GLYCOPYRROLATE, AND FORMOTEROL FUMARATE 2 PUFF: 160; 9; 4.8 AEROSOL, METERED RESPIRATORY (INHALATION) at 08:18

## 2024-10-07 RX ADMIN — ALBUTEROL SULFATE 2 PUFF: 90 AEROSOL, METERED RESPIRATORY (INHALATION) at 11:15

## 2024-10-07 RX ADMIN — ASPIRIN 81 MG 81 MG: 81 TABLET ORAL at 08:20

## 2024-10-07 RX ADMIN — BUDESONIDE, GLYCOPYRROLATE, AND FORMOTEROL FUMARATE 2 PUFF: 160; 9; 4.8 AEROSOL, METERED RESPIRATORY (INHALATION) at 20:48

## 2024-10-07 RX ADMIN — LISINOPRIL 20 MG: 20 TABLET ORAL at 20:47

## 2024-10-07 RX ADMIN — GLIPIZIDE 2.5 MG: 2.5 TABLET, EXTENDED RELEASE ORAL at 16:59

## 2024-10-07 RX ADMIN — GLIPIZIDE 2.5 MG: 2.5 TABLET, EXTENDED RELEASE ORAL at 07:52

## 2024-10-07 RX ADMIN — ENOXAPARIN SODIUM 40 MG: 40 INJECTION SUBCUTANEOUS at 08:19

## 2024-10-07 RX ADMIN — LEVALBUTEROL HYDROCHLORIDE 1.25 MG: 1.25 SOLUTION RESPIRATORY (INHALATION) at 19:58

## 2024-10-07 RX ADMIN — GUAIFENESIN 600 MG: 600 TABLET ORAL at 17:00

## 2024-10-07 RX ADMIN — SODIUM CHLORIDE SOLN NEBU 3% 4 ML: 3 NEBU SOLN at 14:46

## 2024-10-07 RX ADMIN — FAMOTIDINE 20 MG: 20 TABLET, FILM COATED ORAL at 08:20

## 2024-10-07 RX ADMIN — ATORVASTATIN CALCIUM 40 MG: 40 TABLET, FILM COATED ORAL at 17:00

## 2024-10-07 RX ADMIN — ATENOLOL 25 MG: 25 TABLET ORAL at 08:19

## 2024-10-07 RX ADMIN — INSULIN LISPRO 6 UNITS: 100 INJECTION, SOLUTION INTRAVENOUS; SUBCUTANEOUS at 16:58

## 2024-10-07 RX ADMIN — METFORMIN HYDROCHLORIDE 750 MG: 750 TABLET, EXTENDED RELEASE ORAL at 16:59

## 2024-10-07 RX ADMIN — LEVALBUTEROL HYDROCHLORIDE 1.25 MG: 1.25 SOLUTION RESPIRATORY (INHALATION) at 14:46

## 2024-10-07 RX ADMIN — FAMOTIDINE 20 MG: 20 TABLET, FILM COATED ORAL at 17:00

## 2024-10-07 RX ADMIN — LISINOPRIL 20 MG: 20 TABLET ORAL at 08:20

## 2024-10-07 RX ADMIN — MONTELUKAST 10 MG: 10 TABLET, FILM COATED ORAL at 21:02

## 2024-10-07 RX ADMIN — AMLODIPINE BESYLATE 5 MG: 5 TABLET ORAL at 20:47

## 2024-10-07 RX ADMIN — INSULIN LISPRO 8 UNITS: 100 INJECTION, SOLUTION INTRAVENOUS; SUBCUTANEOUS at 11:59

## 2024-10-07 NOTE — PCC PHYSICAL THERAPY
10/08/2024:  Patient being followed by PT, making slow but steady progress.  Presents, following CVA, now with impaired vision, decreased coordination R > L, decreased proprioception R > L, decreased ROM/strength, decreased balance and safety, decreased endurance.   Patient S/mod I bed mobility, CGA / Cl S transfers with SPC, Virgie ambulation up to 190 feet on level and unlevel surfaces with SPC, CGA/Virgie  negotiation of 14 steps with single handrail + SPC.  Patient would benefit from continued inpatient ARC PT to increase function, safety, and increased independence in prep for safe d/c to home.    10/15/2024:   Patient participating in therapy and making positive gains.  DAKOTA fluctuate depending on fatigue level.   Patient MOD I bed mobility, close S up to cg/MIN A transfers with walker, cg/close S up to MIN A ambulation with RW level and unlevel surfaces, MIN A ambulation on outside surfaces with walker, MOD I wheelchair mobility level and unlevel surfaces, CG/close S 14 steps with single HR + SPC, MIN A car transfer.  Remains limited by impaired vision, decreased coordination R > L, decreased proprioception R > L, decreased ROM/strength, decreased balance and safety, decreased endurance.  Patient would benefit from continued inpatient ARC PT to increase function, safety, and increased independence, as well as completed family training, in prep for safe d/c to home.

## 2024-10-07 NOTE — PROGRESS NOTES
Progress Note - Gold Van 78 y.o. male MRN: [id number]    Unit/Bed#: [facility] 216-01 Encounter: [id number]        Subjective:   Patient seen and examined at bedside after reviewing the chart and discussing the case with the caring staff.      Patient examined at bedside.  Patient reports no acute symptoms except weakness in his right hand which is improving.    Labs were reviewed from 10/5/2024.  Patient's CMP showed sodium 133.  Patient's CBC showed hemoglobin 11.7 with hematocrit 35.2.    Physical Exam   Vitals: Blood pressure 122/62, pulse 60, temperature 97.9 °F (36.6 °C), temperature source Temporal, resp. rate 16, height 6' (1.829 m), weight 70.6 kg (155 lb 10.3 oz), SpO2 95%.,Body mass index is 21.11 kg/m².  Constitutional: He appears well-developed.   HEENT: PERR, EOMI, MMM  Cardiovascular: Normal rate and regular rhythm.    Pulmonary/Chest: Effort normal and breath sounds normal.   Abdomen: Soft, + BS, NT    Assessment/Plan:  Gold Van is a(n) 78 y.o. male with CVA.     Adult malnutrition: As evidenced by weight loss, lack of energy and fat loss.    Hypertension.  Patient is on lisinopril 20 mg twice daily, amlodipine 5 mg twice daily, atenolol 25 mg daily.  Type 2 diabetes mellitus.  Hgb A1c 10.6% on 10/2/24. Restart home glipizide 2.5 mg twice daily 10/4 and discontinue Lantus.  Wife to bring metformin  mg daily in the evening from home which will be started on 10/6/2024- on metformin  mg daily for now.  SSI with accu-checks ac/hs.  Carb controlled diet.  GERD.  Patient is on famotidine 20 mg twice daily.   Chronic generalized pruritus.  Ongoing past 5-6 years.  Pt/wife reports receiving extensive workup in the past from various specialists including dermatology and allergist.  Continue alternating prednisone 5 mg and 15 mg daily.    COPD/asthma.  Breztri brought from home.  Continue Singulair 10 mg nightly and Xopenox/sodium chloride neb TID.  Pt follows with [facility] pulmonology.     Pneumonia.  Sputum C&S on 10/1 positive for pseudomonas.  Patient was treated with IV cefepime and then transitioned to PO Vantin to complete 5-day course of antibiotics as recommended by pulmonology.  Continue Vantin 400 mg twice daily x 4 more doses (thru 10/6), Mucinex 600 mg twice daily, Xopenox/sodium chloride neb TID.  Tessalon perles as needed.  Incentive spirometry.   Abnormal echocardiogram.  TTE on 10/1 with posterior wall motion abnormality.  Cardiology saw patient on acute care and recommended outpatient stress test.  Constipation.  I will put the patient on Senokot S daily as needed and MiraLAX as needed 10/5/2024.  Pain and bowel regimen.  As per physiatry.   CVA.   Neurology recommended DAPT with Plavix/ASA x90 days total (~12/29/24) then ASA monotherapy.  Cont atorvastatin 40 mg daily.  Follow-up with neurology in 6 weeks.  Follow-up with cardiology for Zio patch and/or loop recorder on discharge.  Patient is receiving intensive PT OT with management as per physiatry.     Anticipated date of discharge.  TBD.

## 2024-10-07 NOTE — PROGRESS NOTES
10/07/24 1100   Pain Assessment   Pain Assessment Tool 0-10   Pain Score No Pain   Restrictions/Precautions   Precautions Bed/chair alarms;Fall Risk;Supervision on toilet/commode;Visual deficit  (macular degeneration R eye)   Weight Bearing Restrictions No   ROM Restrictions No   Cognition   Overall Cognitive Status WFL   Arousal/Participation Alert;Responsive;Cooperative   Attention Attends with cues to redirect   Orientation Level Oriented X4   Memory Within functional limits   Following Commands Follows one step commands without difficulty   Subjective   Subjective Pt reports he figures he uses the bathroom before the session starts   Roll Left and Right   Comment Pt OOB   Sit to Lying   Comment Pt OOB   Lying to Sitting on Side of Bed   Comment Pt OOB   Sit to Stand   Type of Assistance Needed Supervision   Physical Assistance Level No physical assistance   Comment Cl S SPC   Sit to Stand CARE Score 4   Bed-Chair Transfer   Type of Assistance Needed Incidental touching   Physical Assistance Level No physical assistance   Comment SPC   Chair/Bed-to-Chair Transfer CARE Score 4   Car Transfer   Reason if not Attempted Environmental limitations   Car Transfer CARE Score 10   Walk 10 Feet   Type of Assistance Needed Physical assistance;Verbal cues   Physical Assistance Level 25% or less   Comment CGA/Virgie SPC; VC for upward posture and gaze, R foot clearance   Walk 10 Feet CARE Score 3   Walk 50 Feet with Two Turns   Type of Assistance Needed Physical assistance;Verbal cues   Physical Assistance Level 26%-50%   Comment Virgie SPC; VC for upward posture and gaze, R foot clearance   Walk 50 Feet with Two Turns CARE Score 3   Walk 150 Feet   Type of Assistance Needed Physical assistance;Verbal cues   Physical Assistance Level 26%-50%   Comment Virgie SPC; VC for upward posture and gaze, R foot clearance   Walk 150 Feet CARE Score 3   Ambulation   Primary Mode of Locomotion Prior to Admission Walk   Distance Walked (feet)  190 ft  (190', 120')   Assist Device Cane   Gait Pattern Inconsistant Marsha;Slow Marsha;Decreased foot clearance;R foot drag;Forward Flexion;Narrow KADIE;Lateral deviation;Improper weight shift;Ataxic   Limitations Noted In Balance;Coordination;Endurance;Heel Strike;Posture;Safety;Sequencing;Speed;Strength;Swing   Provided Assistance with: Balance;Direction   Walk Assist Level Minimum Assist   Findings CGA/Virgie SPC; VC for upward posture and gaze, R foot clearance; improved in gait speed and steadiness with VC for anterior weight shift and increased step length   Does the patient walk? 2. Yes   Wheel 50 Feet with Two Turns   Reason if not Attempted Activity not applicable   Wheel 50 Feet with Two Turns CARE Score 9   Wheel 150 Feet   Reason if not Attempted Activity not applicable   Wheel 150 Feet CARE Score 9   Wheelchair mobility   Does the patient use a wheelchair? 0. No   Findings N/A   Curb or Single Stair   Comment Performed earlier in AM   Picking Up Object   Reason if not Attempted Safety concerns   Picking Up Object CARE Score 88   Toilet Transfer   Type of Assistance Needed Supervision   Physical Assistance Level No physical assistance   Comment Cl S grab bar and SPC   Toilet Transfer CARE Score 4   Therapeutic Interventions   Balance Static standing balance unsupported: Normal EO/EC, Narrow EO/EC, Tandem R/L EO/EC, blue foam normal EO/EC   Other transfer training, gait training   Equipment Use   NuStep L2, B/L UE/LE, 10 min   Assessment   Treatment Assessment Pt agreeable to PT this AM, received standing in room with PCA. Challenged pt's static standing balance with changing surfaces, visual input, and stances with good tolerance, required Cl S to occasional Virgie on blue foam and in tandem stance. Pt improved with gait speed and steadiness with VC for anterior weight shift, upright posture, and increased step length. R foot drag continues, especially with fatigue. Pt remains at high fall risk, limited by  decreased LE strength/endurance and righting reaction, though improving towards functional goals. Will continue with current PT POC to improve deficits and promote return to PLOF.   Problem List Decreased strength;Decreased endurance;Impaired balance   PT Barriers   Physical Impairment Decreased strength;Decreased range of motion;Decreased endurance;Impaired balance;Decreased mobility;Decreased coordination;Decreased safety awareness;Impaired vision   Functional Limitation Wheelchair management;Walking;Transfers;Standing;Stair negotiation;Ramp negotiation;Car transfers   Plan   Treatment/Interventions Functional transfer training;LE strengthening/ROM;Therapeutic exercise;Endurance training;Patient/family training;Equipment eval/education;Bed mobility;Gait training   Progress Progressing toward goals   PT Therapy Minutes   PT Time In 1100   PT Time Out 1200   PT Total Time (minutes) 60   PT Mode of treatment - Individual (minutes) 60   PT Mode of treatment - Concurrent (minutes) 0   PT Mode of treatment - Group (minutes) 0   PT Mode of treatment - Co-treat (minutes) 0   PT Mode of Treatment - Total time(minutes) 60 minutes   PT Cumulative Minutes 295     Patient remains OOB in chair, all needs in reach.  Alarm in place and activated.  Encouraged use of call bell, patient verbalizes understanding.

## 2024-10-07 NOTE — PCC CARE MANAGEMENT
Patient admitted to [facility] on 10/4/24 after inpatient hospital stay for multiple foci of acute infarcts . [place] with one flight of stairs to enter. Independent PTA, ambulated with a walking stick.   Readmit :COVID  , nathalie d/c 9/26.Patient for discharge on 10/17, insurance review sent on 10/14. [facility] reserved . Neurology appt scheduled, however they could not get one until Jan 2025. Office put patient on cancellation wait list, will try to get him in sooner.

## 2024-10-07 NOTE — PCC NURSING
10/6/24 Pt was admitted to [facility]. Pt is alert and oriented. Continent of bowel and bladder. Uses urinal at HS. Pt is supervision/contact guard with transfers and ambulation. With Weakness to Right side.    10/14/24 Alert and oriented. R side weakness post stroke is improving. Lungs clear/decreased on RA. Pt has occasional moist cough. Continues on scheduled breathing treatments. Continent of bowel and bladder. Pt has been free from falls while on ARC. LC

## 2024-10-07 NOTE — ASSESSMENT & PLAN NOTE
Lab Results   Component Value Date    HGBA1C 10.6 (H) 10/02/2024       Recent Labs     10/06/24  1627 10/06/24  1950 10/07/24  0716 10/07/24  1116   POCGLU 270* 94 140 319*     Uncontrolled type 2 diabetes with A1C most recently of 10.6  Restarting home glipizide per IM and stopping Lantus  Wife to bring in Home metformin XL 750mg pills, on 500mg here for now  SSI and QID glucose checks  CCD

## 2024-10-07 NOTE — PLAN OF CARE
Problem: Activity Intolerance/Impaired Mobility  Goal: Mobility/activity is maintained at optimum level for patient  Description: Interventions:  - Assess and monitor patient  barriers to mobility and need for assistive/adaptive devices.  - Assess patient's emotional response to limitations.  - Collaborate with interdisciplinary team and initiate plans and interventions as ordered.  - Encourage independent activity per ability.  - Maintain proper body alignment.  - Perform active/passive rom as tolerated/ordered.  - Plan activities to conserve energy.  - Turn patient as appropriate  Outcome: Progressing

## 2024-10-07 NOTE — MALNUTRITION/BMI
This medical record reflects one or more clinical indicators suggestive of malnutrition and/or morbid obesity.    Malnutrition Findings:   Adult Malnutrition type: Chronic illness  Adult Degree of Malnutrition: Malnutrition of moderate degree  Malnutrition Characteristics: Weight loss, Inadequate energy, Fat loss              360 Statement: Malnutrition related to inadequate energy intake as evidenced by 15#(9%) weight loss from 8/7/# to 10/4/#, subcutaneous fat loss orbital/cheek region and extremities, <75% energy intake compared to estimated needs >1 month.    4gm Na, CCD 2 diet thin liquids with Glucerna once daily.     BMI Findings:           Body mass index is 21.11 kg/m².     See Nutrition note dated 10/7/2024 for additional details.  Completed nutrition assessment is viewable in the nutrition documentation.

## 2024-10-07 NOTE — CASE MANAGEMENT
Clinical review update sent to [facility] via [employer]-crow[employer], awaiting determination.

## 2024-10-07 NOTE — NUTRITION
10/07/24 1547   Biochemical Data,Medical Tests, and Procedures   Biochemical Data/Medical Tests/Procedures Lab values reviewed;Meds reviewed   Labs (Comment) 10/5 Na:133, B, Ca:8.2, pro:5.3, alb:2.9, H&H:11.7/35.2. 10/2 A1c:10.6%   Meds (Comment) norvasc, ASA, plavix, lipitor, pepcid, glipizide, humalog, zestril, ativan, metformin, prednisone   Speech Therapy Recommendations (Comment) Patient denies any dysphagia.   Nutrition-Focused Physical Exam   Nutrition-Focused Physical Exam Findings RN skin assessment reviewed  (Wound right pretibial, woudn left arm, wound right arm, wound left knee, pressure injury coccyx per documentation.)   Nutrition-Focused Physical Exam Findings Nonpitting RLE edema, trace LLE edema. Reports some difficulty with fine motor skills of RUE (right  hand dominant). Missing teeth. LBM 10/7. Physical s/s of malnutrition   Medical-Related Concerns Asthma     Diabetes mellitus (HCC)     Environmental allergies     Hyperlipidemia     Hypertension     Macular degeneration 2020 right eye   Orthostatic hypotension     Osteopenia     Pneumothorax   Patient Reported  Sinusitis   Adequacy of Intake   Nutrition Modality PO   Feeding Route   PO Independent   Current PO Intake   Current Diet Order Caridac, CCD 2 diet thin liquids   Nutrition Supplements Glucerna  (vanilla once daily)   Current Meal Intake %   Intake Supplements %   Estimated calorie intake compared to estimated need Currently meeting nutrient needs.   PES Statement   Problem Clinical   Weight (3) Unintended weight loss NC-3.2   Related to Decreased appetite   As evidenced by: Weight loss   Additional PES needed? Yes   PES Statement 2   Problem Clinical   Related to Other (Comment)  (DM & chronic need for steroid)   As evidenced by Abnormal lab (Specify)  (A1c:10.6%)   Biochemical (2) Altered nutrition-related laboratory values (specify) NC-2.2  (A1c%)   Recommendations/Interventions   Malnutrition/BMI Present Yes    Adult Malnutrition type Chronic illness   Adult Degree of Malnutrition Malnutrition of moderate degree   Malnutrition Characteristics Weight loss;Inadequate energy;Fat loss   360 Statement Malnutrition related to inadequate energy intake as evidenced by 15#(9%) weight loss from 8/7/# to 10/4/#, subcutaneous fat loss orbital/cheek region and extremities, <75% energy intake compared to estimated needs >1 month.   Summary RN consult. Cardiac, CCD 2 diet thin liquids. Vanilla glucerna once daily. Meal completions %. Wife Stacia present during visit. Patient states his appetite has improved however had previously been decreased for 1-2 weeks. He reports he follows a fairly healthy diet but does admit to using added salt with meals. Does  not consume alcohol, soda and does not consume many sweets. Consumes 2 meals per day skipping lunch. Enjoys tea, coffee and some type of banana, cereal, toast, overnight oats or muffin for breakfast. D: varies. He states he checks his BG every morning and was not using any insulin at home. He states he was consuming a protein shake daily at home that was ~475kcal. 10/4/#; 10/1/#; 8/7/#, 15#(9%) loss in 2 months, significant; 4/28/#. Reports usual weight is 170# however was as high as 230#. Patient denies any dysphagia. Wound right pretibial, wound left arm, wound right arm, wound left knee, pressure injury coccyx per documentation. Nonpitting RLE edema, trace LLE edema. Reports some difficulty with fine motor skills of RUE (right hand dominant). Missing teeth. LBM 10/7. Physical s/s of malnutrition. Recommend liberalize to 4gm Na, CCD 2 diet thin liquids. Continue glucerna once daily. Food menu and prescribed diet reviewed.   Interventions/Recommendations Adjust diet order   Intervention Comments 4gm Na, CCD 2 diet thin liquids   Education Assessment   Education Education initiated/ completed   Education Notes Patient previously provided Stroke  education on 10/2. Education provided regarding prescribed diet. Education regarding food menu also provided.   Patient Nutrition Goals   Goal Avoid weight loss;Glucose WNL;Meet PO needs   Goal Status Initiated   Timeframe to complete goal by next f/u   Nutrition Complexity Risk   Nutrition complexity level Moderate risk   Follow up date 10/11/24

## 2024-10-07 NOTE — PCC OCCUPATIONAL THERAPY
10/07/24 Pt participating in ADL education,safety with functional txfs and mobility, TE/TA for generalized strength/ROM/coordination. Pts LOF noted above. Pt making positive gains toward OT goals. Pt barriers include: decreased strength/coordination and endurance, visual impairment, decreased safety and balance, generalized endurance. Plan is to continue with skilled OT to further progress pt toward OT goals.     10/15/24: Pt participating in ADLs, safety with functional txfs and mobility, TE/TA for generalized strength/ROM/coordination and education. Pts LOF noted above. Pt barriers include: decreased strength/endurance, decreased safety and judgement, and impaired balance. Pt is progressing toward goals. Plan is to continue with skilled OT to further progress pt toward OT goals.

## 2024-10-07 NOTE — PROGRESS NOTES
Progress Note - PMR   Name: Gold Van 78 y.o. male I MRN: [id number]  Unit/Bed#: -01 I Date of Admission: 10/4/2024   Date of Service: 10/7/2024 I Hospital Day: 3     Assessment & Plan  CVA (cerebrovascular accident) (HCC)  MRI revealed multiple foci of acute infarcts in the bilateral frontoparietal cortices and subcortical white matter left > right  Placed on DAPT and statin for secondary stroke prophylaxis per neurology and follow up with Neuro in 6 weeks (DAPT for 6 months)  Etiology thought to be coagulopathy secondary to COVID infection  CT CAP completed to rule out malignancy  TTE showing 50% EF but posterior wall hypokinesis and will need cardiology follow up  Patient endorses improvement in right sided weakness. Has ongoing dysmetria and incoordination in the right nahomi body  Physical, Occupational, and Speech therapy      Type 2 diabetes mellitus with complication, without long-term current use of insulin ([facility])  Lab Results   Component Value Date    HGBA1C 10.6 (H) 10/02/2024       Recent Labs     10/06/24  1627 10/06/24  1950 10/07/24  0716 10/07/24  1116   POCGLU 270* 94 140 319*     Uncontrolled type 2 diabetes with A1C most recently of 10.6  Restarting home glipizide per IM and stopping Lantus  Wife to bring in Home metformin XL 750mg pills, on 500mg here for now  SSI and QID glucose checks  CCD    Essential hypertension  Atenolol 25 mg daily as well as amlodipine 5 mg twice daily and lisinopril 20 mg twice daily  Management per internal medicine and monitor blood pressures and therapy and on routine vitals  Hyperlipidemia  Continue statin  Sepsis due to pneumonia ([facility])  Met sepsis criteria on admission with tahcycardia, leukocytosis. Lactic acidosis. Also with acute repsiratory failure requiring CPAP-> BiPAP and eventually nasal canula  Blood cultures felt to be contaminants  CXR with left base opacity atelectasis vs pneumonia  S/P IV Ceftriaxone and azithromycin for 4 days  Sputum C&S was  positive for Pseudomonas-received cefepime/Azithromycin while inpatient, transitioning to Vantin for total of 2 more days (5 days course of treatment as recommended by pulmonology)   Pulm followed in acute care  IV steroids weaned and now on prior chronic prednisone regimen  Hyponatremia  Patient presents with hyponatremia sodium of 134 as of 10/3  Continue to monitor on biweekly labs or sooner if clinically indicated  Pruritus  Multiple years history of generalized pruritus with extensive work up in past  On alternating prednisone doses  Monitor for any changes  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HCC)  Suspected COPD exacerbation in setting of COVID infection  Initially requiring CPAP, transitioned to BiPAP, successfully weaned to room air  Appreciate input of pulmonology who followed  Received IV Solu-Medrol, transitioned to oral prednisone on 10/2  Continue robust regimen of meds for baseline COPD    COVID-19  Presented with shortness of breath and was in the hospital 9/24/24 for acute respiratory symptoms related to infection  Received remdesivir and baricitinib  Felt that strokes related COVID coagulopathy  Abnormal echocardiogram  Had TTE with posterior wall abnormalities/mild hypokinesis noted. EF 50%  Follow up with cardiology as an outpatient, would benefit from monitor and stress test  Gastroesophageal reflux disease without esophagitis  Continue pepcid  Impaired mobility and activities of daily living  Patient was evaluated by the rehabilitation team MD and an appropriate candidate for acute inpatient rehabilitation program at this time.  The patient will tolerate 3 hours/day 5 to 7 days/week of intensive physical, occupational and speech therapy in order to obtain goals for community discharge  Due to the patient's functional Compared to their baseline level of function in addition to their ongoing medical needs, the patient would benefit from daily supervision from a rehabilitation physician as well as rehabilitation  nursing to implement and adjust the medical as well as functional plan of care in order to meet the patient's goals.    Neuropathy  Neuropathy in feet and lower 1/3 of the tibia bilaterally  Undiagnosed previously, but to consider in therapies  Likely related to diabetes  Consider gabapentin, however not uncomfortable at this time  Foot drop, left  Patient states he has had issues with the foot for some time now  Presents with 1/5 ankle dorsiflexor strength on the left  Eval for bracing    Subjective   Gold Van is a 78 y.o. male with history of hypertension, type 2 diabetes, COPD, chronic steroid use who presented to the [facility] initially on 9/29/2024 for shortness of breath.  Patient with a history of COPD and asthma and recent COVID infection with hospitalization.  He arrived via ambulance with a CPAP on and then required a transition to BiPAP in the ER.  He was initiated on IV cefepime for suspected pneumonia and also IV steroids.  He met sepsis criteria for the tachycardia and leukocytosis in the setting of dental pneumonia.  He had some difficulty with coordination of his right leg during a PT session as of 10/1 and neurology evaluated the patient and recommended stroke.  An MRI of the brain did note multiple acute infarcts in the bilateral frontoparietal cortices and subcortical white matter changes without evidence of mass effect or hemorrhagic transformation.  Patient was started on dual antiplatelet therapy and statin and additionally had a CT of the chest abdomen pelvis to rule out other causes of hypercoagulability, stroke, malignancy.  Hypercoagulability it was thought to be related to his prior COVID infection.  He was on telemetry without any issues and no atrial fibrillation noted.  He did have a TTE that noted some wall motion abnormality in the posterior sections.  Plan for outpatient monitor and stress test.  Pulmonology also following the patient and he continued  with cefepime and azithromycin and is discharging on 2 additional days for coverage of Pseudomonas.  This was grown in the sputum and was recommended by pulmonology. The patient was evaluated by the Rehabilitation team and deemed an appropriate candidate for comprehensive inpatient rehabilitation and admitted to the [facility] on 10/4/2024  1:24 PM     Chief Complaint: f/u stroke    Interval: Patient seen and evaluated in room without any acute issues overnight.  Wife is present in the room and answered all questions.  We talked today about stroke recovery and statistics revolving around that.  Prognosis for general functional recovery also discussed.  We also talked about his chronic left-sided foot drop and trialing braces during therapy sessions in order to help prevent tripping in the future.  Denies any fever, chills, nausea vomiting cough coarse breath, diarrhea constipation.  Fully participating in therapy he is tired at this time after shower but did well after his general therapy sessions today.  Last bowel movement 10/7.    Objective :  Temp:  [97.9 °F (36.6 °C)] 97.9 °F (36.6 °C)  HR:  [55-60] 60  BP: (122-124)/(59-62) 122/62  Resp:  [16-18] 16  SpO2:  [95 %-97 %] 95 %  O2 Device: None (Room air)    Functional Update:  Mobility: Ambulation contact-guard to min assist with use of rolling walker for 150 feet and trialing single-point cane  Transfers: Guarded with transfers up to min assist at time trialing single-point cane  ADLs: Assist for showering, min assist for upper body ADLs and min to mod assist for lower body ADLs    Physical Exam  Vitals reviewed.   Constitutional:       General: He is not in acute distress.  HENT:      Head: Normocephalic and atraumatic.      Right Ear: External ear normal.      Left Ear: External ear normal.      Nose: Nose normal. No rhinorrhea.      Mouth/Throat:      Mouth: Mucous membranes are moist.      Pharynx: Oropharynx is clear.   Eyes:      General: No scleral  icterus.  Cardiovascular:      Rate and Rhythm: Normal rate.      Pulses: Normal pulses.   Pulmonary:      Effort: Pulmonary effort is normal. No respiratory distress.   Abdominal:      General: There is no distension.      Palpations: Abdomen is soft.   Musculoskeletal:      Right lower leg: Edema present.      Left lower leg: Edema present.   Skin:     General: Skin is warm and dry.   Neurological:      Mental Status: He is alert and oriented to person, place, and time.      Comments: Dysmetria in the right hemibody with left-sided foot drop.  Sensory deficits in the distal legs   Psychiatric:         Mood and Affect: Mood normal.         Behavior: Behavior normal.           Scheduled Meds:  Current Facility-Administered Medications   Medication Dose Route Frequency Provider Last Rate    acetaminophen  650 mg Oral Q6H PRN Annie L Dagnall, PA-C      albuterol  2 puff Inhalation Q4H PRN Annie L Dagnall, PA-C      albuterol  2.5 mg Nebulization Q4H PRN Annie L Dagnall, PA-C      amLODIPine  5 mg Oral BID Annie L Dagnall, PA-C      aspirin  81 mg Oral Daily Annie L Dagnall, PA-C      atenolol  25 mg Oral Daily Annie L Dagnall, PA-C      atorvastatin  40 mg Oral QPM Annie L Dagnall, PA-C      benzonatate  100 mg Oral TID PRN Annie L Dagnall, PA-C      Budeson-Glycopyrrol-Formoterol  2 puff Inhalation Q12H Annie L Dagnall, PA-C      clopidogrel  75 mg Oral Daily Annie L Dagnall, PA-C      enoxaparin  40 mg Subcutaneous Daily Annie L Dagnall, PA-C      famotidine  20 mg Oral BID Annie L Dagnall, PA-C      glipiZIDE  2.5 mg Oral BID With Meals Annie L Dagnall, PA-C      guaiFENesin  600 mg Oral BID Annie L Dagnall, PA-C      insulin lispro  1-6 Units Subcutaneous HS Annie L Dagnall, PA-C      insulin lispro  2-12 Units Subcutaneous TID AC Annie L Dagnall, PA-C      levalbuterol  1.25 mg Nebulization TID Annie L Dagnall, PA-C      lisinopril  20 mg Oral BID Annie L Dagnall, PA-C       LORazepam  0.5 mg Oral HS PRN Annie Martines PA-C      metFORMIN  750 mg Oral Daily With Dinner Christian Hendrickson MD      montelukast  10 mg Oral HS Annie Martines PA-C      polyethylene glycol  17 g Oral Daily PRN Christian Hendrickson MD      predniSONE  15 mg Oral Every Other Day Annie Martines PA-C      predniSONE  5 mg Oral Every Other Day Annie Martines PA-C      senna-docusate sodium  1 tablet Oral HS PRN Christian Hendrickson MD      sodium chloride  4 mL Nebulization TID Annie Martines PA-C           Lab Results: I have reviewed the following results:  Results from last 7 days   Lab Units 10/05/24  0533 10/03/24  0427 10/02/24  0424   HEMOGLOBIN g/dL 11.7* 12.0 11.1*   HEMATOCRIT % 35.2* 36.5 33.9*   WBC Thousand/uL 8.78 9.70 8.14   PLATELETS Thousands/uL 252 254 222     Results from last 7 days   Lab Units 10/05/24  0533 10/03/24  0427 10/02/24  0424   BUN mg/dL 23 21 25   SODIUM mmol/L 133* 134* 132*   POTASSIUM mmol/L 3.9 4.0 4.3   CHLORIDE mmol/L 103 101 102   CREATININE mg/dL 0.83 0.91 0.84   AST U/L 13  --   --    ALT U/L 21  --   --               Brandon Munoz,   Physical Medicine and Rehabilitation  [facility]    I have spent a total time of 35 minutes in caring for this patient on the day of the visit/encounter including Counseling / Coordination of care, Documenting in the medical record, and Communicating with other healthcare professionals .

## 2024-10-07 NOTE — PROGRESS NOTES
10/07/24 0900   Pain Assessment   Pain Assessment Tool 0-10   Pain Score No Pain   Restrictions/Precautions   Precautions Bed/chair alarms;Fall Risk;Supervision on toilet/commode;Visual deficit  (R eye macular degeneration)   Weight Bearing Restrictions No   ROM Restrictions No   Cognition   Overall Cognitive Status WFL   Arousal/Participation Alert;Responsive;Cooperative   Attention Attends with cues to redirect   Orientation Level Oriented X4   Memory Within functional limits   Following Commands Follows one step commands without difficulty   Subjective   Subjective Pt reports he has noticed significant differences between yesterday and today with his strength   Roll Left and Right   Comment Pt OOB   Sit to Lying   Comment Pt OOB   Lying to Sitting on Side of Bed   Comment Pt OOB   Sit to Stand   Type of Assistance Needed Supervision   Physical Assistance Level No physical assistance   Comment Cl S SPC vs RW   Sit to Stand CARE Score 4   Bed-Chair Transfer   Type of Assistance Needed Incidental touching   Physical Assistance Level No physical assistance   Comment SPC vs RW   Chair/Bed-to-Chair Transfer CARE Score 4   Car Transfer   Reason if not Attempted Environmental limitations   Car Transfer CARE Score 10   Walk 10 Feet   Type of Assistance Needed Physical assistance;Verbal cues   Physical Assistance Level 25% or less   Comment CGA/Virgie SPC; VC for upward posture and gaze, R foot clearance   Walk 10 Feet CARE Score 3   Walk 50 Feet with Two Turns   Type of Assistance Needed Physical assistance;Verbal cues   Physical Assistance Level 25% or less   Comment CGA/Virgie SPC; VC for upward posture and gaze, R foot clearance   Walk 50 Feet with Two Turns CARE Score 3   Walk 150 Feet   Type of Assistance Needed Physical assistance;Verbal cues   Physical Assistance Level 25% or less   Comment CGA/Virgie SPC; VC for upward posture and gaze, R foot clearance   Walk 150 Feet CARE Score 3   Ambulation   Primary Mode of  Locomotion Prior to Admission Walk   Distance Walked (feet) 150 ft  (150' (RW), 150' (SPC), 20' (SPC))   Assist Device Cane;Roller Walker   Gait Pattern Inconsistant Marsha;Slow Marsha;Decreased foot clearance;R foot drag;Forward Flexion;Lateral deviation;Narrow KADIE;Improper weight shift   Limitations Noted In Balance;Coordination;Endurance;Heel Strike;Posture;Safety;Sequencing;Speed;Strength;Swing   Provided Assistance with: Balance;Direction   Walk Assist Level Minimum Assist   Findings CGA/Virgie SPC; VC for upward posture and gaze, R foot clearance   Does the patient walk? 2. Yes   Wheel 50 Feet with Two Turns   Reason if not Attempted Activity not applicable   Wheel 50 Feet with Two Turns CARE Score 9   Wheel 150 Feet   Reason if not Attempted Activity not applicable   Wheel 150 Feet CARE Score 9   Wheelchair mobility   Does the patient use a wheelchair? 0. No   Findings N/A   Curb or Single Stair   Style negotiated Single stair   Type of Assistance Needed Physical assistance;Verbal cues   Physical Assistance Level 25% or less   Comment CGA/Virgie up/down 14 steps with R HR + SPC, VC for sequencing   1 Step (Curb) CARE Score 3   4 Steps   Type of Assistance Needed Physical assistance;Verbal cues   Physical Assistance Level 25% or less   Comment CGA/Virgie up/down 14 steps with R HR + SPC, VC for sequencing   4 Steps CARE Score 3   12 Steps   Type of Assistance Needed Physical assistance;Verbal cues   Physical Assistance Level 25% or less   Comment CGA/Virgie up/down 14 steps with R HR + SPC, VC for sequencing   12 Steps CARE Score 3   Picking Up Object   Reason if not Attempted Safety concerns   Picking Up Object CARE Score 88   Toilet Transfer   Comment Pt did not require during session   Therapeutic Interventions   Strengthening Seated LE TE   Other transfer training, gait training, stair training   Assessment   Treatment Assessment Pt agreeable to PT this AM, received sitting upright in recliner. Trialed  ambulation with SPC to challenge increased LE use and dynamic balance. Pt required CGA/Virgie, with VC for maintaining upward gaze and R foot clearance. Pt did well with single HR + SPC for stair negotiation, noted comfort with this approach as opposed to B/L UE on single HR. He remains limited by LE fatigue and impaired righting reactions/balance, but improving towards functional goals. Will continue with current PT POC to improve deficits and promote return to PLOF.   Problem List Decreased strength;Decreased endurance;Impaired balance   PT Barriers   Physical Impairment Decreased strength;Decreased range of motion;Decreased endurance;Impaired balance;Decreased mobility;Decreased coordination;Decreased safety awareness;Impaired vision   Functional Limitation Wheelchair management;Walking;Transfers;Standing;Stair negotiation;Ramp negotiation;Car transfers   Plan   Treatment/Interventions Functional transfer training;LE strengthening/ROM;Therapeutic exercise;Endurance training;Patient/family training;Equipment eval/education;Bed mobility;Gait training   Progress Progressing toward goals   PT Therapy Minutes   PT Time In 0900   PT Time Out 1000   PT Total Time (minutes) 60   PT Mode of treatment - Individual (minutes) 60   PT Mode of treatment - Concurrent (minutes) 0   PT Mode of treatment - Group (minutes) 0   PT Mode of treatment - Co-treat (minutes) 0   PT Mode of Treatment - Total time(minutes) 60 minutes   PT Cumulative Minutes 235     Seated LE TE:  3x10, B/L LEs, 2# L LE, no weight L LE, VC for eccentric control and decreased compensatory movement  March  LAQ  Hip ABd - green T-band  Hip ADd - ball Nate  GS  HR  TR  HS Curls - green T-band    Patient remains OOB in chair, all needs in reach.  Alarm in place and activated.  Encouraged use of call bell, patient verbalizes understanding.

## 2024-10-07 NOTE — PROGRESS NOTES
10/07/24 1230   Pain Assessment   Pain Assessment Tool 0-10   Pain Score No Pain   Restrictions/Precautions   Precautions Bed/chair alarms;Fall Risk;Supervision on toilet/commode;Visual deficit  (macular degeneration R eye)   Weight Bearing Restrictions No   ROM Restrictions No   Oral Hygiene   Comment completed this a.m.   Grooming   Able To Initiate Tasks;Comb/Brush Hair;Wash/Dry Face;Wash/Dry Hands   Limitation Noted In Coordination   Findings seated   Shower/Bathe Self   Type of Assistance Needed Physical assistance;Verbal cues;Adaptive equipment   Physical Assistance Level 25% or less   Comment min A   Shower/Bathe Self CARE Score 3   Bathing   Assessed Bath Style Shower   Anticipated D/C Bath Style Shower   Able to Gather/Transport No   Able to Adjust Water Temperature Yes   Able to Wash/Rinse/Dry (body part) Left Arm;Right Arm;L Upper Leg;R Upper Leg;L Lower Leg/Foot;R Lower Leg/Foot;Chest;Buttocks;Perineal Area;Abdomen   Limitations Noted in Balance;Coordination;Endurance;ROM;Safety;Strength   Positioning Standing;Seated   Adaptive Equipment Hand Held Shower;Shower Seat;Shower Bars   Findings  [facility] to aide in EC   Tub/Shower Transfer   Limitations Noted In LE Strength;UE Strength;Safety;Endurance;Coordination;Balance   Adaptive Equipment Grab Bars;Seat with Back   Assessed Shower   Findings MIN A, very fatigued   Upper Body Dressing   Type of Assistance Needed Supervision;Verbal cues   Upper Body Dressing CARE Score 4   Lower Body Dressing   Type of Assistance Needed Physical assistance;Verbal cues   Physical Assistance Level 25% or less   Comment After ADL complete pt provided with reacher to enable pt to manage threading garments over B LEs   Lower Body Dressing CARE Score 3   Putting On/Taking Off Footwear   Type of Assistance Needed Physical assistance;Adaptive equipment;Verbal cues   Physical Assistance Level 26%-50%   Comment slippers min/mod A due to edema in B feet,  removed socks supervision, donned with sock aide and min A   Putting On/Taking Off Footwear CARE Score 3   Picking Up Object   Type of Assistance Needed Physical assistance;Verbal cues;Adaptive equipment   Physical Assistance Level 26%-50%   Picking Up Object CARE Score 3   Dressing/Undressing Clothing   Remove UB Clothes Pullover Shirt   Don UB Clothes Pullover Shirt   Remove LB Clothes Pants;Undergarment;Socks   Don LB Clothes Shoes;Socks;Undergarment;Shorts   Limitations Noted In Balance;Coordination;Endurance;Safety;Strength;ROM   Adaptive Equipment Sock Aide   Findings provided pt with reacher and LHS after ADL complete and instr pt to use devices   Sit to Stand   Type of Assistance Needed Incidental touching;Verbal cues   Comment CGA :very fatigued   Sit to Stand CARE Score 4   Toileting Hygiene   Type of Assistance Needed Physical assistance;Verbal cues;Adaptive equipment   Physical Assistance Level 25% or less   Comment CGA   Toileting Hygiene CARE Score 3   Toileting   Able to Pull Clothing down yes, up yes.   Manage Hygiene Bladder   Limitations Noted In Balance;Coordination;Safety;UE Strength;LE Strength   Adaptive Equipment Grab Bar   Toilet Transfer   Type of Assistance Needed Incidental touching;Verbal cues;Adaptive equipment   Toilet Transfer CARE Score 4   Toilet Transfer   Surface Assessed Raised Toilet   Transfer Technique Standard   Limitations Noted In Balance;Endurance;Safety;LE Strength;UE Strength   Adaptive Equipment Grab Bar;SPC   Coordination   Fine Motor Pt provided with red t putty and educ in the following: gross grasp, fine pinch, twisting, pulling. Pt completed B Hands   Cognition   Overall Cognitive Status WFL   Arousal/Participation Alert;Cooperative   Attention Attends with cues to redirect   Orientation Level Oriented X4   Memory Within functional limits   Following Commands Follows one step commands without difficulty   Additional Activities   Additional Activities Comments fxnl  mobility SPC with min A, LOB noted, unable to self correct: therapist supporting pts hand on L side with SPC in R hand   Activity Tolerance   Activity Tolerance Patient limited by fatigue   Assessment   Treatment Assessment OT tx addressed ADL via shower level as noted in respective sections of note. Instr in safe techniuqes and compensatory strategies. Pt fatigued throughout shower. Educ in EC techniuqes throughout and at end of session provided with AE for LB self care. Pt would benefit from ongoing education. Pt struggled with use of SPC due to fatigue which caused pt to not be as steady as per his report from earlier session with PT. Pt also completed strength/coordination exercises with use of t-putty for B hands. Wife present towards end of session and discussed need for sneakers for pt. Pt then encouraged to elevate B LEs due to B LE edema. Pt barriers include: decreased strength and ROM/coordination, decreased safety and balance, impaired vision. Plan is to continue with skilled OT to further progress pt toward OT goals.   Prognosis Good   Problem List Decreased strength;Decreased endurance;Impaired balance;Decreased mobility;Decreased coordination;Decreased safety awareness;Impaired vision   Plan   Treatment/Interventions ADL retraining;Functional transfer training;Therapeutic exercise;Endurance training;Patient/family training;Equipment eval/education;Gait training;Compensatory technique education;Spoke to nursing;OT   Progress Progressing toward goals   OT Therapy Minutes   OT Time In 1230   OT Time Out 1415   OT Total Time (minutes) 105   OT Mode of treatment - Individual (minutes) 105   OT Mode of treatment - Concurrent (minutes) 0   OT Mode of treatment - Group (minutes) 0   OT Mode of treatment - Co-treat (minutes) 0   OT Mode of Treatment - Total time(minutes) 105 minutes   OT Cumulative Minutes 105   Therapy Time missed   Time missed? No

## 2024-10-08 LAB
GLUCOSE SERPL-MCNC: 263 MG/DL (ref 65–140)
GLUCOSE SERPL-MCNC: 296 MG/DL (ref 65–140)
GLUCOSE SERPL-MCNC: 347 MG/DL (ref 65–140)
GLUCOSE SERPL-MCNC: 93 MG/DL (ref 65–140)

## 2024-10-08 PROCEDURE — 97530 THERAPEUTIC ACTIVITIES: CPT

## 2024-10-08 PROCEDURE — 97110 THERAPEUTIC EXERCISES: CPT

## 2024-10-08 PROCEDURE — 97112 NEUROMUSCULAR REEDUCATION: CPT

## 2024-10-08 PROCEDURE — 82948 REAGENT STRIP/BLOOD GLUCOSE: CPT

## 2024-10-08 PROCEDURE — 99233 SBSQ HOSP IP/OBS HIGH 50: CPT | Performed by: STUDENT IN AN ORGANIZED HEALTH CARE EDUCATION/TRAINING PROGRAM

## 2024-10-08 PROCEDURE — 97110 THERAPEUTIC EXERCISES: CPT | Performed by: PHYSICAL THERAPIST

## 2024-10-08 PROCEDURE — 97112 NEUROMUSCULAR REEDUCATION: CPT | Performed by: PHYSICAL THERAPIST

## 2024-10-08 PROCEDURE — 97116 GAIT TRAINING THERAPY: CPT | Performed by: PHYSICAL THERAPIST

## 2024-10-08 PROCEDURE — 97542 WHEELCHAIR MNGMENT TRAINING: CPT

## 2024-10-08 RX ADMIN — FAMOTIDINE 20 MG: 20 TABLET, FILM COATED ORAL at 08:27

## 2024-10-08 RX ADMIN — GLIPIZIDE 2.5 MG: 2.5 TABLET, EXTENDED RELEASE ORAL at 07:35

## 2024-10-08 RX ADMIN — ENOXAPARIN SODIUM 40 MG: 40 INJECTION SUBCUTANEOUS at 08:25

## 2024-10-08 RX ADMIN — SODIUM CHLORIDE SOLN NEBU 3% 4 ML: 3 NEBU SOLN at 19:50

## 2024-10-08 RX ADMIN — GUAIFENESIN 600 MG: 600 TABLET ORAL at 17:16

## 2024-10-08 RX ADMIN — LEVALBUTEROL HYDROCHLORIDE 1.25 MG: 1.25 SOLUTION RESPIRATORY (INHALATION) at 14:41

## 2024-10-08 RX ADMIN — MONTELUKAST 10 MG: 10 TABLET, FILM COATED ORAL at 21:06

## 2024-10-08 RX ADMIN — SODIUM CHLORIDE SOLN NEBU 3% 4 ML: 3 NEBU SOLN at 07:35

## 2024-10-08 RX ADMIN — METFORMIN HYDROCHLORIDE 750 MG: 750 TABLET, EXTENDED RELEASE ORAL at 16:24

## 2024-10-08 RX ADMIN — ATORVASTATIN CALCIUM 40 MG: 40 TABLET, FILM COATED ORAL at 17:16

## 2024-10-08 RX ADMIN — ATENOLOL 25 MG: 25 TABLET ORAL at 08:26

## 2024-10-08 RX ADMIN — AMLODIPINE BESYLATE 5 MG: 5 TABLET ORAL at 08:27

## 2024-10-08 RX ADMIN — AMLODIPINE BESYLATE 5 MG: 5 TABLET ORAL at 20:24

## 2024-10-08 RX ADMIN — BUDESONIDE, GLYCOPYRROLATE, AND FORMOTEROL FUMARATE 2 PUFF: 160; 9; 4.8 AEROSOL, METERED RESPIRATORY (INHALATION) at 20:22

## 2024-10-08 RX ADMIN — FAMOTIDINE 20 MG: 20 TABLET, FILM COATED ORAL at 17:16

## 2024-10-08 RX ADMIN — GLIPIZIDE 2.5 MG: 2.5 TABLET, EXTENDED RELEASE ORAL at 16:24

## 2024-10-08 RX ADMIN — INSULIN LISPRO 6 UNITS: 100 INJECTION, SOLUTION INTRAVENOUS; SUBCUTANEOUS at 11:55

## 2024-10-08 RX ADMIN — SODIUM CHLORIDE SOLN NEBU 3% 4 ML: 3 NEBU SOLN at 14:40

## 2024-10-08 RX ADMIN — CLOPIDOGREL 75 MG: 75 TABLET ORAL at 08:27

## 2024-10-08 RX ADMIN — BUDESONIDE, GLYCOPYRROLATE, AND FORMOTEROL FUMARATE 2 PUFF: 160; 9; 4.8 AEROSOL, METERED RESPIRATORY (INHALATION) at 08:29

## 2024-10-08 RX ADMIN — LEVALBUTEROL HYDROCHLORIDE 1.25 MG: 1.25 SOLUTION RESPIRATORY (INHALATION) at 07:35

## 2024-10-08 RX ADMIN — PREDNISONE 15 MG: 5 TABLET ORAL at 08:26

## 2024-10-08 RX ADMIN — LISINOPRIL 20 MG: 20 TABLET ORAL at 08:27

## 2024-10-08 RX ADMIN — GUAIFENESIN 600 MG: 600 TABLET ORAL at 08:26

## 2024-10-08 RX ADMIN — LEVALBUTEROL HYDROCHLORIDE 1.25 MG: 1.25 SOLUTION RESPIRATORY (INHALATION) at 19:50

## 2024-10-08 RX ADMIN — LISINOPRIL 20 MG: 20 TABLET ORAL at 20:24

## 2024-10-08 RX ADMIN — INSULIN LISPRO 8 UNITS: 100 INJECTION, SOLUTION INTRAVENOUS; SUBCUTANEOUS at 16:23

## 2024-10-08 RX ADMIN — INSULIN LISPRO 3 UNITS: 100 INJECTION, SOLUTION INTRAVENOUS; SUBCUTANEOUS at 21:05

## 2024-10-08 RX ADMIN — ASPIRIN 81 MG 81 MG: 81 TABLET ORAL at 08:26

## 2024-10-08 NOTE — TEAM CONFERENCE
Acute RehabilitationTeam Conference Note  Date: 10/8/2024   Time: 10:55 AM       Patient Name:  Gold Van       Medical Record Number: [id number]   YOB: 1945  Sex: Male          Room/Bed:  [facility] 216/[facility] 216-01  Payor Info:  [facility] REP / Plan: [employer]  REP / Product Type: Medicare PPO /      Admitting Diagnosis: Multiple lacunar infarcts (HCC) [I63.81]   Admit Date/Time:  10/4/2024  1:24 PM  Admission Comments: No comment available     Primary Diagnosis:  CVA (cerebrovascular accident) ([facility])  Principal Problem: CVA (cerebrovascular accident) ([facility])    Patient Active Problem List    Diagnosis Date Noted    Gastroesophageal reflux disease without esophagitis 10/04/2024    Impaired mobility and activities of daily living 10/04/2024    Neuropathy 10/04/2024    Foot drop, left 10/04/2024    Abnormal echocardiogram 10/03/2024    CVA (cerebrovascular accident) ([facility]) 10/02/2024    Dysmetria 10/01/2024    COVID-19 09/24/2024    Acute respiratory insufficiency 09/24/2024    Shortness of breath 07/23/2024    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    History of pneumothorax 04/28/2024    Acute respiratory failure with hypoxia ([facility]) 04/28/2024    Non-recurrent bilateral inguinal hernia without obstruction or gangrene 03/18/2024    Pruritus 03/18/2024    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 06/16/2021    Sepsis due to pneumonia ([facility]) 03/24/2021    Hyponatremia 03/24/2021    Pulmonary nodule 03/24/2021    Type 2 diabetes mellitus with complication, without long-term current use of insulin ([facility]) 10/23/2017    Essential hypertension 10/23/2017    Asthma 10/23/2017    Hyperlipidemia 10/23/2017       Physical Therapy:    Weight Bearing Status: Weight Bearing as Tolerated  Transfers: Incidental Touching, Supervision  Bed Mobility: Supervision, Independent  Amulation Distance (ft): 190 feet  Ambulation: Minimal Assistance  Assistive Device for Ambulation: Single Point  Cane  Wheelchair Mobility Distance:  (N/A)  Wheelchair Mobility:  (N/A)  Number of Stairs: 14  Assistive Device for Stairs: Right Hand Rail (single HR + SPC)  Stair Assistance: Minimal Assistance (CGA/Virgie)  Ramp: Moderate Assistance  Assistive Device for Ramp: Roller Walker  Discharge Recommendations: Home with:  DC Home with:: Family Support, Outpatient Physical Therapy    10/08/2024:  Patient being followed by PT, making slow but steady progress.  Presents, following CVA, now with impaired vision, decreased coordination R > L, decreased proprioception R > L, decreased ROM/strength, decreased balance and safety, decreased endurance.   Patient S/mod I bed mobility, CGA / Cl S transfers with SPC, Virgie ambulation up to 190 feet on level and unlevel surfaces with SPC, CGA/Virgie  negotiation of 14 steps with single handrail + SPC.  Patient would benefit from continued inpatient ARC PT to increase function, safety, and increased independence in prep for safe d/c to home.      Occupational Therapy:  Eating: Independent  Grooming: Supervision  Bathing: Minimal Assistance  Bathing: Minimal Assistance  Upper Body Dressing: Supervision  Lower Body Dressing: Minimal Assistance  Toileting: Incidental Touching  Tub/Shower Transfer: Minimal Assistance  Toilet Transfer: Incidental Touching  Cognition: Within Defined Limits  Orientation: Person, Place, Time, Situation  Discharge Recommendations: Home with:  DC Home with:: Family Support, Home Occupational Therapy       10/07/24 Pt participating in ADL education,safety with functional txfs and mobility, TE/TA for generalized strength/ROM/coordination. Pts LOF noted above. Pt making positive gains toward OT goals. Pt barriers include: decreased strength/coordination and endurance, visual impairment, decreased safety and balance, generalized endurance. Plan is to continue with skilled OT to further progress pt toward OT goals.     Speech Therapy:           No notes on file    Nursing  Notes:  Appetite: Fair  Diet Type: Diabetic                      Diet Patient/Family Education Complete: Yes                         Type of Wound Patient/Family Education: Yes  Bladder: Continent     Bladder Patient/Family Education: Yes  Bowel: Continent     Bowel Patient/Family Education: Yes     Pain Score: 0                          Pain Patient/Family Education: Yes  Medication Management/Safety  Injectable: Lovenox  Safe Administration: Yes  Medication Patient/Family Education Complete: Yes    10/6/24 Pt was admitted to [facility] S[facility] Stroke. Pt is alert and oriented. Continent of bowel and bladder. Uses urinal at HS. Pt is supervision/contact guard with transfers and ambulation. With Weakness to Right side.    Case Management:     Discharge Planning  Living Arrangements: Lives w/ Spouse/significant other  Support Systems: Self, Yazdanism/nima community, Spouse/significant other, Friends/neighbors, Family members, Children  Assistance Needed: possible therapy post discharge  Type of Current Residence: Private residence  Current Home Care Services: No  Patient admitted to [facility] on 10/4/24 after inpatient hospital stay for multiple foci of acute infarcts . Patient [place] with one flight of stairs to enter. Independent PTA, ambulated with a walking stick.   Readmit :COVID  , d/laila 9/26. Clinical review update sent to [facility] 10/7    Is the patient actively participating in therapies? yes  List any modifications to the treatment plan: na    Barriers Interventions   CVA, COPD, abnormal echo, neuropathy with foot drop, DM Medical management and oversight, trial of MAFO, education   Right sided weakness, coordination Therapeutic exercise, therapeutic activity   Decreased balance, safety Cues, ADL, transfer, gait training.  Referral to ST   Decreased end Therapeutic exercise, therapeutic activity         Is the patient making expected progress toward goals? yes  List any update or changes to goals:  na    Medical Goals: Patient will be able to manage medical conditions and comorbid conditions with medications and follow up upon completion of rehab program    Weekly Team Goals:   Rehab Team Goals  ADL Team Goal: Patient will be independent with ADLs with least restrictive device upon completion of rehab program  Transfer Team Goal: Patient will be independent with transfers with least restrictive device upon completion of rehab program  Locomotion Team Goal: Patient will be independent with locomotion with least restrictive device upon completion of rehab program    Mod I self care  Mod I bed mobility, transfers, and mobility    Health and wellness: to be able to travel and visit family    Discussion: Plan for return home with wife outpatient PT and OT    Anticipated Discharge Date:  Oct 17, 2024  [facility] Team Members Present:  The following team members are supervising care for this patient and were present during this Weekly Team Conference.    Dr. Munoz, DO Gail Miramontes, LAURO Parker, LAURO and Nat Collado, LAURO Velez, PT  Sowmya Penny, OTR/L and Rosalee Dugan, OTR/L  Laurie Tovar, CCC-SLP

## 2024-10-08 NOTE — PROGRESS NOTES
"   10/08/24 1100   Pain Assessment   Pain Assessment Tool 0-10   Pain Score No Pain   Restrictions/Precautions   Precautions Bed/chair alarms;Fall Risk;Supervision on toilet/commode;Visual deficit  (R macular degeneration)   Weight Bearing Restrictions No   ROM Restrictions No   Cognition   Overall Cognitive Status WFL   Arousal/Participation Alert;Responsive;Cooperative   Attention Attends with cues to redirect   Orientation Level Oriented X4   Memory Within functional limits   Following Commands Follows one step commands without difficulty   Subjective   Subjective \"I feel weak today\"   Roll Left and Right   Comment Pt OOB   Sit to Lying   Comment Pt OOB   Lying to Sitting on Side of Bed   Comment Pt OOB   Sit to Stand   Type of Assistance Needed Incidental touching   Physical Assistance Level No physical assistance   Comment CG with RW and SPC, increased time to complete due to reports of LE fatigue   Sit to Stand CARE Score 4   Bed-Chair Transfer   Type of Assistance Needed Physical assistance   Physical Assistance Level 25% or less   Comment Min A x 1 with SPC, increased time to complete due to reports of LE fatigue   Chair/Bed-to-Chair Transfer CARE Score 3   Car Transfer   Reason if not Attempted Environmental limitations   Car Transfer CARE Score 10   Walk 10 Feet   Comment Pt unable to ambule further than 2 ft due to LE fatigue and unsteadiness.   Reason if not Attempted Safety concerns   Walk 10 Feet CARE Score 88   Walk 50 Feet with Two Turns   Reason if not Attempted Safety concerns   Walk 50 Feet with Two Turns CARE Score 88   Walk 150 Feet   Reason if not Attempted Safety concerns   Walk 150 Feet CARE Score 88   Walking 10 Feet on Uneven Surfaces   Reason if not Attempted Safety concerns   Walking 10 Feet on Uneven Surfaces CARE Score 88   Ambulation   Primary Mode of Locomotion Prior to Admission Walk   Distance Walked (feet) 2 ft   Assist Device Cane   Gait Pattern Inconsistant Marsha;Decreased foot " clearance;Forward Flexion;Wide KADIE;Shuffle;Improper weight shift   Limitations Noted In Balance;Coordination;Device Management;Heel Strike;Endurance;Posture;Safety;Strength   Provided Assistance with: Balance;Trunk Support   Walk Assist Level Moderate Assist   Findings Pt unable to ambulate further than 2 ft due to LE fatigue and unsteadiness.   Does the patient walk? 2. Yes   Wheel 50 Feet with Two Turns   Type of Assistance Needed Independent   Physical Assistance Level No physical assistance   Comment Supervision/Mod I on level and unlevel surfaces   Wheel 50 Feet with Two Turns CARE Score 6   Wheelchair mobility   Does the patient use a wheelchair? 1. Yes   Type of Wheelchair Used 1. Manual   Method Right upper extremity;Left upper extremity;Right lower extremity;Left lower extremity   Distance Level Surface (feet) 117 ft  (117 x 2 to and from therapy gym)   Findings Supervision/Mod I on level and unlevel surfaces   Curb or Single Stair   Reason if not Attempted Safety concerns   1 Step (Curb) CARE Score 88   4 Steps   Reason if not Attempted Safety concerns   4 Steps CARE Score 88   12 Steps   Reason if not Attempted Safety concerns   12 Steps CARE Score 88   Picking Up Object   Reason if not Attempted Safety concerns   Picking Up Object CARE Score 88   Toilet Transfer   Comment Not needed during session   Therapeutic Interventions   Strengthening Supine TE   Balance Transfer training   Other wheelchair mobility   Assessment   Treatment Assessment Pt agreeable to PT session this morning. No reports of pain, but SOB reported throughout session. CG for sit to stand transfers with RW vs SPC but min A x 1 for stand pivot transfers with SPC. Pt unable to ambulate further than 2 ft due to reported LE weakness and unsteadiness. Pt demonstrated with fatigue, memo LE weakness, and SOB which limits ability to perform functional mobility tasks. S/mod Independent for WC mobility with cues for different techniques. Supine TE  for general LE strengthening. Transfer training for increased balance, safety and independence with functional mobility. Small wound on pts RLE noted during supine exercises, nursing made aware and dressing applied. Pt appropriate for concurrent therapy to increase motivation and encouragement between patients with similar deficits while completing therapy session. Pt returned to room in WC with alarms on and all needs within reach.   Problem List Decreased strength;Decreased endurance;Impaired balance;Decreased mobility;Decreased coordination;Decreased safety awareness;Impaired vision   Barriers to Discharge Inaccessible home environment;Decreased caregiver support   PT Barriers   Physical Impairment Decreased strength;Decreased range of motion;Decreased endurance;Impaired balance;Decreased mobility;Decreased coordination;Decreased safety awareness;Impaired vision   Functional Limitation Wheelchair management;Walking;Transfers;Standing;Stair negotiation;Ramp negotiation;Car transfers   Plan   Treatment/Interventions Functional transfer training;LE strengthening/ROM;Elevations;Therapeutic exercise;Endurance training;Patient/family training;Equipment eval/education;Bed mobility;Gait training   Progress Progressing toward goals   PT Therapy Minutes   PT Time In 1100   PT Time Out 1200   PT Total Time (minutes) 60   PT Mode of treatment - Individual (minutes) 30   PT Mode of treatment - Concurrent (minutes) 30   PT Mode of treatment - Group (minutes) 0   PT Mode of treatment - Co-treat (minutes) 0   PT Mode of Treatment - Total time(minutes) 60 minutes   PT Cumulative Minutes 415   Therapy Time missed   Time missed? No

## 2024-10-08 NOTE — PROGRESS NOTES
10/08/24 1230   Pain Assessment   Pain Assessment Tool 0-10   Pain Score No Pain   Restrictions/Precautions   Precautions Bed/chair alarms;Fall Risk;Supervision on toilet/commode;Visual deficit  (R macular degeneration)   Weight Bearing Restrictions No   ROM Restrictions No   Sit to Stand   Type of Assistance Needed Supervision;Incidental touching   Sit to Stand CARE Score 4   Bed-Chair Transfer   Type of Assistance Needed Incidental touching   Comment CGA pivoting with RW instead of SPC to alow increased support   Chair/Bed-to-Chair Transfer CARE Score 4   Exercise Tools   Hand Gripper gripper with pegs B hands follwoing retireval of pegs from the floor using the reacher in the RUE and placing with the LUE   Other Exercise Tool 1 card match matching ind cards with those on board using BUE for reaching to place and remove, sorting into sequential order to complete   Coordination   Fine Motor small items retrieval from red putty and shaping into a ART using BUE   Cognition   Orientation Level Oriented X4   Additional Activities   Additional Activities Other (Comment)   Additional Activities Comments fxl mobility to and from OT room with RW and CGA using RW for increased support due to increased fatigue   Assessment   Treatment Assessment Pt presents seated in recliner agreeable to OT Session includign transfers/ mobility with RW and BUE therex/ theract focusing on neuro lizeth.  Pt requires assist and supervision due to decreased balance, safety, endurance and strength/ ROM/ coordiantion khoa of RUE/ RLE with a hx of L foot drop as well. Pt is progressing towards goals and will beenfit from continued skilled OT services to increase independence with daily tasks.   Problem List Decreased range of motion;Decreased strength;Decreased endurance;Impaired balance;Decreased safety awareness;Decreased coordination   Plan   Treatment/Interventions ADL retraining;Functional transfer training;Therapeutic exercise;Endurance  training;Patient/family training;Equipment eval/education;Compensatory technique education   Progress Progressing toward goals   OT Therapy Minutes   OT Time In 1230   OT Time Out 1400   OT Total Time (minutes) 90   OT Mode of treatment - Individual (minutes) 60   OT Mode of treatment - Concurrent (minutes) 30   Therapy Time missed   Time missed? No     Addendum: Pt participates in 30 minutes concurrent treatment focusing on BUE therex/ theract with simialr goals as another patient to increase strength/ activity tolerance

## 2024-10-08 NOTE — ASSESSMENT & PLAN NOTE
Lab Results   Component Value Date    HGBA1C 10.6 (H) 10/02/2024       Recent Labs     10/07/24  1624 10/07/24  1956 10/08/24  0710 10/08/24  1145   POCGLU 292* 193* 93 296*     Uncontrolled type 2 diabetes with A1C most recently of 10.6  Restarting home glipizide per IM and stopping Lantus  Wife to bring in Home metformin XL 750mg pills, on 500mg here for now  SSI and QID glucose checks  CCD

## 2024-10-08 NOTE — PROGRESS NOTES
10/08/24 0900   Pain Assessment   Pain Assessment Tool 0-10   Pain Score No Pain   Restrictions/Precautions   Precautions Bed/chair alarms;Fall Risk;Supervision on toilet/commode;Visual deficit  (R macular degeneration)   Weight Bearing Restrictions No   ROM Restrictions No   Cognition   Overall Cognitive Status WFL   Orientation Level Oriented X4   Subjective   Subjective Pt reports he sleeps well, turned lights off at 9 a.m. and goes to bed   Roll Left and Right   Comment Pt OOB   Sit to Lying   Comment Pt OOB   Lying to Sitting on Side of Bed   Comment Pt OOB   Sit to Stand   Type of Assistance Needed Supervision   Physical Assistance Level No physical assistance   Comment Cl S/CGA, SPC   Sit to Stand CARE Score 4   Bed-Chair Transfer   Type of Assistance Needed Physical assistance   Physical Assistance Level 25% or less   Comment SPC and min A/HHA   Chair/Bed-to-Chair Transfer CARE Score 3   Car Transfer   Reason if not Attempted Environmental limitations   Car Transfer CARE Score 10   Walk 10 Feet   Type of Assistance Needed Physical assistance   Physical Assistance Level 25% or less   Comment min A, SPC, HHA with steady assist   Walk 10 Feet CARE Score 3   Walk 50 Feet with Two Turns   Type of Assistance Needed Physical assistance   Physical Assistance Level 26%-50%   Comment SPC, min-mod A/HHA   Walk 50 Feet with Two Turns CARE Score 3   Walk 150 Feet   Type of Assistance Needed Physical assistance   Physical Assistance Level 26%-50%   Comment min-mod A, SPC   Walk 150 Feet CARE Score 3   Walking 10 Feet on Uneven Surfaces   Type of Assistance Needed Physical assistance   Physical Assistance Level 51%-75%   Comment mod A, green foam, SPC   Walking 10 Feet on Uneven Surfaces CARE Score 2   Ambulation   Primary Mode of Locomotion Prior to Admission Walk   Distance Walked (feet) 175 ft  (175 ft x 1, 125 ft x 1)   Assist Device Cane   Gait Pattern Inconsistant Marsha;Decreased foot clearance;Forward  Flexion;Wide KADIE;Shuffle;Improper weight shift   Does the patient walk? 2. Yes   Toilet Transfer   Type of Assistance Needed Supervision   Physical Assistance Level No physical assistance   Comment Sandy samano, CGA   Toilet Transfer CARE Score 4   Therapeutic Interventions   Strengthening Seated LE TE - seated on red disc - 1# on left; AROM on right   Equipment Use   NuStep Nustep L3, 8 mins, bilateral UEs/LEs   Assessment   Treatment Assessment Pt presents seated in recliner, agreeable to session.  He presents with unsteady gait and weakness in LEs.  Difficulty with foot clearance bilaterally, cues for foot clearance or heel strike.  Requires min-mod A with SPC this date.  Easily fatigues.  Difficulty with maintaining upright on red disc, cues for postural correction.  In need of ongoing interventions to maximize return to PLOF.  Cont per POC.   Problem List Decreased strength;Decreased endurance;Impaired balance;Decreased mobility;Decreased coordination;Decreased safety awareness;Impaired vision   PT Barriers   Physical Impairment Decreased strength;Decreased range of motion;Decreased endurance;Impaired balance;Decreased mobility;Decreased coordination;Decreased safety awareness;Impaired vision   Functional Limitation Wheelchair management;Walking;Transfers;Standing;Stair negotiation;Ramp negotiation;Car transfers   Plan   Treatment/Interventions Functional transfer training;LE strengthening/ROM;Elevations;Therapeutic exercise;Endurance training;Patient/family training;Equipment eval/education;Bed mobility;Gait training   Progress Progressing toward goals   PT Therapy Minutes   PT Time In 0900   PT Time Out 1000   PT Total Time (minutes) 60   PT Mode of treatment - Individual (minutes) 60   PT Mode of treatment - Concurrent (minutes) 0   PT Mode of treatment - Group (minutes) 0   PT Mode of treatment - Co-treat (minutes) 0   PT Mode of Treatment - Total time(minutes) 60 minutes   PT Cumulative Minutes 355     Seated  LE TE:  3x10, B/L LEs, 1# on left; AROM on right  March - seated on disc  LAQ - no disc, posterior thigh support  Hip ABd - blue T-band  Hip ADd - Nate  HR - seated on disc - blue t-band  TR - seated on disc - blue t-band  HS Curls - blue T-band - posterior chair support    Patient remains OOB in chair with transition to OT session.  Encouraged use of call bell, patient verbalizes understanding.

## 2024-10-08 NOTE — NURSING NOTE
Initially this a.m. c/o feeling more  dyspneic.  VSS.  In no acute distress.  Pt felt better after scheduled resp tx's and meds.  Team and Dr. Hendrickson, Dr. Gomes and Annie CRYSTAL aware.  Tolerating therapy.  Continue same plan of care.  See rehab care scores.  Resting comfortably receiving visitors at this time.

## 2024-10-08 NOTE — PROGRESS NOTES
Progress Note - Gold Van 78 y.o. male MRN: [id number]    Unit/Bed#: [facility] 216-01 Encounter: [id number]        Subjective:   Patient seen and examined at bedside after reviewing the chart and discussing the case with the caring staff.      Patient examined at bedside.  Patient felt short of breath this morning but improved after receiving morning neb treatment.  Denies any other complaints at this time.    Physical Exam   Vitals: Blood pressure 148/71, pulse 91, temperature 99 °F (37.2 °C), temperature source Temporal, resp. rate 18, height 6' (1.829 m), weight 70.6 kg (155 lb 10.3 oz), SpO2 93%.,Body mass index is 21.11 kg/m².  Constitutional: Patient in no acute distress.  HEENT: PERR, EOMI, MMM.  Cardiovascular: Normal rate and regular rhythm.    Pulmonary/Chest: Effort normal and breath sounds normal.   Abdomen: Soft, + BS, NT.    Assessment/Plan:  Gold Van is a(n) 78 y.o. male with CVA.     Hypertension.  Patient is on lisinopril 20 mg twice daily, amlodipine 5 mg twice daily, atenolol 25 mg daily.  Type 2 diabetes mellitus.  Hgb A1c 10.6% on 10/2/24. Restart home glipizide 2.5 mg twice daily 10/4 and and discontinue Lantus.  Wife brought in metformin  mg daily.  SSI with accu-checks ac/hs.  Carb controlled diet.  GERD.  Patient is on famotidine 20 mg twice daily.   Chronic generalized pruritus.  Ongoing past 5-6 years.  Pt/wife reports receiving extensive workup in the past from various specialists including dermatology and allergist.  Continue alternating prednisone 5 mg and 15 mg daily.    COPD/asthma.  Arslantri brought from home.  Continue Singulair 10 mg nightly and Xopenox/sodium chloride neb TID.  Pt follows with [facility] pulmonology.    Pneumonia.  Sputum C&S on 10/1 positive for pseudomonas.  Patient was treated with IV cefepime and then transitioned to PO Vantin to complete 5-day course of antibiotics as recommended by pulmonology.  Continue Vantin 400 mg twice daily x 4 more doses  (thru 10/6), Mucinex 600 mg twice daily, Xopenox/sodium chloride neb TID.  Tessalon perles as needed.  Incentive spirometry.   Abnormal echocardiogram.  TTE on 10/1 with posterior wall motion abnormality.  Cardiology saw patient on acute care and recommended outpatient stress test.  Hyponatremia.  Level 134 -> 133 on 10/5/24.  Cont to monitor.    Malnutrition.  Adult Malnutrition type: Chronic illness.  Adult Degree of Malnutrition: Malnutrition of moderate degree. Malnutrition Characteristics: Weight loss, Inadequate energy, Fat loss.  Dietician following.  Pain and bowel regimen.  As per physiatry.  Patient started on Senokot S daily as needed and MiraLAX as needed 10/5/2024.  CVA.   Neurology recommended DAPT with Plavix/ASA x90 days total (~12/29/24) then ASA monotherapy.  Cont atorvastatin 40 mg daily.  Follow-up with neurology in 6 weeks.  Follow-up with cardiology for Zio patch and/or loop recorder on discharge.  Patient is receiving intensive PT OT with management as per physiatry.     Anticipated date of discharge.  TBD.    The patient was discussed with Dr. Hendrickson and he is in agreement with the above note.

## 2024-10-08 NOTE — PROGRESS NOTES
10/08/24 1000   Pain Assessment   Pain Assessment Tool 0-10   Pain Score No Pain   Restrictions/Precautions   Precautions Bed/chair alarms;Fall Risk;Supervision on toilet/commode;Visual deficit  (R macular degeneration)   Weight Bearing Restrictions No   ROM Restrictions No   Eating   Type of Assistance Needed Set-up / clean-up   Eating CARE Score 5   Oral Hygiene   Type of Assistance Needed Independent   Oral Hygiene CARE Score 6   Sit to Stand   Type of Assistance Needed Supervision;Incidental touching   Sit to Stand CARE Score 4   Bed-Chair Transfer   Type of Assistance Needed Physical assistance   Physical Assistance Level 25% or less   Comment with SPC   Chair/Bed-to-Chair Transfer CARE Score 3   Toileting Hygiene   Type of Assistance Needed Supervision   Toileting Hygiene CARE Score 4   Toilet Transfer   Type of Assistance Needed Supervision   Toilet Transfer CARE Score 4   Exercise Tools   Exercise Tools Yes   Other Exercise Tool 1 SAdnerbox 2 sets 15 all directions with 2# wt and BUE   Cognition   Orientation Level Oriented X4   Additional Activities   Additional Activities Other (Comment)   Additional Activities Comments fxl mobility short distances with SPC and CG/ Min A   Other Comments   Assessment Pt participates in 32 minutes concurrent treatment focusing on BUE therex/ theract with simialr goals as another patient to increase strength/ activity tolerance   Assessment   Treatment Assessment Pt presents seated in armchair directly from PT and agreeable to OT Session including BUE therex/ theract and transfers/ mobility. Pt tolerates session without complaints requiring increased rest breaks and will benefit form completion of OTs ession after lunch.   Problem List Decreased strength;Decreased range of motion;Decreased endurance;Impaired balance;Decreased safety awareness   Plan   Treatment/Interventions ADL retraining;Functional transfer training;Endurance training;Therapeutic exercise;Equipment  eval/education;Patient/family training;Compensatory technique education   Progress Progressing toward goals   OT Therapy Minutes   OT Time In 1000   OT Time Out 1040   OT Total Time (minutes) 40   OT Mode of treatment - Individual (minutes) 8   OT Mode of treatment - Concurrent (minutes) 32   Therapy Time missed   Time missed? No

## 2024-10-08 NOTE — CASE MANAGEMENT
CM met with patient and wife Stacia, reviewed team meeting information, including planned d/c date of 10/17. At this time, rehab team is recommending outpt PT/OT. CM explained if patient progresses more quickly , he may discharge sooner. Patient and wife expressed understanding and agreement . CM will continue to follow.

## 2024-10-08 NOTE — PLAN OF CARE
Problem: Nutrition  Goal: Nutrition/Hydration status is improving  Description: Monitor and assess patient's nutrition/hydration status for malnutrition (ex- brittle hair, bruises, dry skin, pale skin and conjunctiva, muscle wasting, smooth red tongue, and disorientation). Collaborate with interdisciplinary team and initiate plan and interventions as ordered.  Monitor patient's weight and dietary intake as ordered or per policy. Utilize nutrition screening tool and intervene per policy. Determine patient's food preferences and provide high-protein, high-caloric foods as appropriate.     - Assist patient with eating.  - Allow adequate time for meals.  - Encourage patient to take dietary supplement as ordered.  - Collaborate with clinical nutritionist.  - Include patient/family/caregiver in decisions related to nutrition.  Outcome: Progressing     Problem: PAIN - ADULT  Goal: Verbalizes/displays adequate comfort level or baseline comfort level  Description: Interventions:  - Encourage patient to monitor pain and request assistance  - Assess pain using appropriate pain scale  - Administer analgesics based on type and severity of pain and evaluate response  - Implement non-pharmacological measures as appropriate and evaluate response  - Consider cultural and social influences on pain and pain management  - Notify physician/advanced practitioner if interventions unsuccessful or patient reports new pain  Outcome: Progressing

## 2024-10-08 NOTE — PROGRESS NOTES
Progress Note - PMR   Name: Gold Van 78 y.o. male I MRN: [id number]  Unit/Bed#: -01 I Date of Admission: 10/4/2024   Date of Service: 10/8/2024 I Hospital Day: 4     Assessment & Plan  CVA (cerebrovascular accident) (HCC)  MRI revealed multiple foci of acute infarcts in the bilateral frontoparietal cortices and subcortical white matter left > right  Placed on DAPT and statin for secondary stroke prophylaxis per neurology and follow up with Neuro in 6 weeks (DAPT for 6 months)  Etiology thought to be coagulopathy secondary to COVID infection  CT CAP completed to rule out malignancy  TTE showing 50% EF but posterior wall hypokinesis and will need cardiology follow up  Patient endorses improvement in right sided weakness. Has ongoing dysmetria and incoordination in the right nahomi body  Physical, Occupational, and Speech therapy      Type 2 diabetes mellitus with complication, without long-term current use of insulin ([facility])  Lab Results   Component Value Date    HGBA1C 10.6 (H) 10/02/2024       Recent Labs     10/07/24  1624 10/07/24  1956 10/08/24  0710 10/08/24  1145   POCGLU 292* 193* 93 296*     Uncontrolled type 2 diabetes with A1C most recently of 10.6  Restarting home glipizide per IM and stopping Lantus  Wife to bring in Home metformin XL 750mg pills, on 500mg here for now  SSI and QID glucose checks  CCD    Essential hypertension  Atenolol 25 mg daily as well as amlodipine 5 mg twice daily and lisinopril 20 mg twice daily  Management per internal medicine and monitor blood pressures and therapy and on routine vitals  Hyperlipidemia  Continue statin  Sepsis due to pneumonia ([facility])  Met sepsis criteria on admission with tahcycardia, leukocytosis. Lactic acidosis. Also with acute repsiratory failure requiring CPAP-> BiPAP and eventually nasal canula  Blood cultures felt to be contaminants  CXR with left base opacity atelectasis vs pneumonia  S/P IV Ceftriaxone and azithromycin for 4 days  Sputum C&S was  positive for Pseudomonas-received cefepime/Azithromycin while inpatient, transitioning to Vantin for total of 2 more days (5 days course of treatment as recommended by pulmonology)   Pulm followed in acute care  IV steroids weaned and now on prior chronic prednisone regimen  Hyponatremia  Patient presents with hyponatremia sodium of 134 as of 10/3  Continue to monitor on biweekly labs or sooner if clinically indicated  Pruritus  Multiple years history of generalized pruritus with extensive work up in past  On alternating prednisone doses  Monitor for any changes  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HCC)  Suspected COPD exacerbation in setting of COVID infection  Initially requiring CPAP, transitioned to BiPAP, successfully weaned to room air  Appreciate input of pulmonology who followed  Received IV Solu-Medrol, transitioned to oral prednisone on 10/2  Continue robust regimen of meds for baseline COPD    COVID-19  Presented with shortness of breath and was in the hospital 9/24/24 for acute respiratory symptoms related to infection  Received remdesivir and baricitinib  Felt that strokes related COVID coagulopathy  Abnormal echocardiogram  Had TTE with posterior wall abnormalities/mild hypokinesis noted. EF 50%  Follow up with cardiology as an outpatient, would benefit from monitor and stress test  Gastroesophageal reflux disease without esophagitis  Continue pepcid  Impaired mobility and activities of daily living  Patient was evaluated by the rehabilitation team MD and an appropriate candidate for acute inpatient rehabilitation program at this time.  The patient will tolerate 3 hours/day 5 to 7 days/week of intensive physical, occupational and speech therapy in order to obtain goals for community discharge  Due to the patient's functional Compared to their baseline level of function in addition to their ongoing medical needs, the patient would benefit from daily supervision from a rehabilitation physician as well as rehabilitation  nursing to implement and adjust the medical as well as functional plan of care in order to meet the patient's goals.    Neuropathy  Neuropathy in feet and lower 1/3 of the tibia bilaterally  Undiagnosed previously, but to consider in therapies  Likely related to diabetes  Consider gabapentin, however not uncomfortable at this time  Foot drop, left  Patient states he has had issues with the foot for some time now  Presents with 1/5 ankle dorsiflexor strength on the left  Eval for bracing    Subjective   Gold Van is a 78 y.o. male with history of hypertension, type 2 diabetes, COPD, chronic steroid use who presented to the [facility] initially on 9/29/2024 for shortness of breath. Patient with a history of COPD and asthma and recent COVID infection with hospitalization. He arrived via ambulance with a CPAP on and then required a transition to BiPAP in the ER. He was initiated on IV cefepime for suspected pneumonia and also IV steroids. He met sepsis criteria for the tachycardia and leukocytosis in the setting of dental pneumonia. He had some difficulty with coordination of his right leg during a PT session as of 10/1 and neurology evaluated the patient and recommended stroke. An MRI of the brain did note multiple acute infarcts in the bilateral frontoparietal cortices and subcortical white matter changes without evidence of mass effect or hemorrhagic transformation. Patient was started on dual antiplatelet therapy and statin and additionally had a CT of the chest abdomen pelvis to rule out other causes of hypercoagulability, stroke, malignancy. Hypercoagulability it was thought to be related to his prior COVID infection. He was on telemetry without any issues and no atrial fibrillation noted. He did have a TTE that noted some wall motion abnormality in the posterior sections. Plan for outpatient monitor and stress test. Pulmonology also following the patient and he continued with  cefepime and azithromycin and is discharging on 2 additional days for coverage of Pseudomonas. This was grown in the sputum and was recommended by pulmonology. The patient was evaluated by the Rehabilitation team and deemed an appropriate candidate for comprehensive inpatient rehabilitation and admitted to the [facility] on 10/4/2024 1:24 PM     Chief Complaint: f/u stroke and f/u ambulatory dysfunction    Interval: Patient seen and evaluated in room.  He denies any fever, chills, nausea, vomiting, cough, diarrhea or constipation.  Overall does not feel like he did as well today as he did yesterday but he says he has been having therapy for at least 4 and half hours today.  Felt some chest tightness little bit more this morning with his breathing and felt significantly better after his breathing treatment.  On examination today his lungs are without rales or wheezing but limited movement.  Oxygenation remained stable.  We will continue monitoring throughout the afternoon and overnight until tomorrow and see how he does.  Anticipate he will have a better day tomorrow however if continues to still have difficulties we will discuss with internal medicine about next steps.  Wife is also present in the room and all questions answered.  We talked about his case during weekly team conference with therapies, nursing and case management and discussed his current functional progress anticipated barriers and the anticipated discharge at this time with October 17.    Objective :  Temp:  [97.9 °F (36.6 °C)-99 °F (37.2 °C)] 99 °F (37.2 °C)  HR:  [85-91] 91  BP: (148-164)/(71-72) 148/71  Resp:  [16-18] 18  SpO2:  [93 %-95 %] 93 %  O2 Device: None (Room air)    Functional Update:  Physical Therapy Occupational Therapy Speech Therapy   Weight Bearing Status: Weight Bearing as Tolerated  Transfers: Incidental Touching, Supervision  Bed Mobility: Supervision, Independent  Amulation Distance (ft): 190 feet  Ambulation: Minimal  Assistance  Assistive Device for Ambulation: Single Point Cane  Wheelchair Mobility Distance:  (N/A)  Wheelchair Mobility:  (N/A)  Number of Stairs: 14  Assistive Device for Stairs: Right Hand Rail (single HR + SPC)  Stair Assistance: Minimal Assistance (CGA/Virgie)  Ramp: Moderate Assistance  Assistive Device for Ramp: Roller Walker  Discharge Recommendations: Home with:  DC Home with:: Family Support, Outpatient Physical Therapy   Eating: Independent  Grooming: Supervision  Bathing: Minimal Assistance  Bathing: Minimal Assistance  Upper Body Dressing: Supervision  Lower Body Dressing: Minimal Assistance  Toileting: Incidental Touching  Tub/Shower Transfer: Minimal Assistance  Toilet Transfer: Incidental Touching  Cognition: Within Defined Limits  Orientation: Person, Place, Time, Situation                   Physical Exam  Vitals reviewed.   Constitutional:       General: He is not in acute distress.  HENT:      Head: Normocephalic and atraumatic.      Right Ear: External ear normal.      Left Ear: External ear normal.      Nose: Nose normal. No rhinorrhea.      Mouth/Throat:      Mouth: Mucous membranes are moist.      Pharynx: Oropharynx is clear.   Eyes:      General: No scleral icterus.  Cardiovascular:      Rate and Rhythm: Normal rate.      Pulses: Normal pulses.   Pulmonary:      Effort: Pulmonary effort is normal. No respiratory distress.   Abdominal:      General: There is no distension.      Palpations: Abdomen is soft.   Musculoskeletal:      Right lower leg: Edema present.      Left lower leg: Edema present.   Skin:     General: Skin is warm and dry.   Neurological:      Mental Status: He is alert and oriented to person, place, and time.      Comments: Dysmetria in the right hemibody with left sided..  Sensory deficits in bilateral lower extremities in the feet and the distal ankles and distal tibial region   Psychiatric:         Mood and Affect: Mood normal.         Behavior: Behavior normal.            Scheduled Meds:  Current Facility-Administered Medications   Medication Dose Route Frequency Provider Last Rate    acetaminophen  650 mg Oral Q6H PRN Annie STEPHANIE Barbosanall, PA-C      albuterol  2 puff Inhalation Q4H PRN Annie L Chelseynall, PA-C      albuterol  2.5 mg Nebulization Q4H PRN Annie L Chelseynall, PA-C      amLODIPine  5 mg Oral BID Annie L Chelseynall, PA-C      aspirin  81 mg Oral Daily Annie L Chelseynall, PA-C      atenolol  25 mg Oral Daily Annie L Chelseynall, PA-C      atorvastatin  40 mg Oral QPM Annie L Chelseynall, PA-C      benzonatate  100 mg Oral TID PRN Annie L Chelseynall, PA-C      Budeson-Glycopyrrol-Formoterol  2 puff Inhalation Q12H Annie L Chelseynall, PA-C      clopidogrel  75 mg Oral Daily Annie L Chelseynall, PA-C      enoxaparin  40 mg Subcutaneous Daily Annie STEPHANIE Barbosanall, PA-C      famotidine  20 mg Oral BID Annie STEPHANIE Barbosanall, PA-C      glipiZIDE  2.5 mg Oral BID With Meals Anniesamra Izquierdol, PA-C      guaiFENesin  600 mg Oral BID Anniesamra Izquierdol, PA-C      insulin lispro  1-6 Units Subcutaneous HS Anniesamra Izquierdol, PA-C      insulin lispro  2-12 Units Subcutaneous TID AC Anniesamra Izquierdol, PA-C      levalbuterol  1.25 mg Nebulization TID Anniesamra Izquierdol, PA-C      lisinopril  20 mg Oral BID Anniesamra Izquierdol, PA-C      LORazepam  0.5 mg Oral HS PRN Anniesamra Izquierdol, PA-C      metFORMIN  750 mg Oral Daily With Dinner Christian Hendrickson MD      montelukast  10 mg Oral HS Annie STEPHANIE Izquierdol, PA-C      polyethylene glycol  17 g Oral Daily PRN Christian Hendrickson MD      predniSONE  15 mg Oral Every Other Day Annie L Chelseynall, PA-C      predniSONE  5 mg Oral Every Other Day Annie L Chelseynall, PA-C      senna-docusate sodium  1 tablet Oral HS PRN Christian Hendrickson MD      sodium chloride  4 mL Nebulization TID Annie STEPHANIE Izquierdol, PA-C           Lab Results: I have reviewed the following results:  Results from last 7 days   Lab Units 10/05/24  0533 10/03/24  0427 10/02/24  0424   HEMOGLOBIN g/dL 11.7* 12.0  11.1*   HEMATOCRIT % 35.2* 36.5 33.9*   WBC Thousand/uL 8.78 9.70 8.14   PLATELETS Thousands/uL 252 254 222     Results from last 7 days   Lab Units 10/05/24  0533 10/03/24  0427 10/02/24  0424   BUN mg/dL 23 21 25   SODIUM mmol/L 133* 134* 132*   POTASSIUM mmol/L 3.9 4.0 4.3   CHLORIDE mmol/L 103 101 102   CREATININE mg/dL 0.83 0.91 0.84   AST U/L 13  --   --    ALT U/L 21  --   --                 Brandon Munoz, DO  Physical Medicine and Rehabilitation  [facility]    I have spent a total time of 55 minutes in caring for this patient on the day of the visit/encounter including Counseling / Coordination of care, Documenting in the medical record, and Communicating with other healthcare professionals .

## 2024-10-09 PROBLEM — E44.0 MODERATE PROTEIN-CALORIE MALNUTRITION (HCC): Status: ACTIVE | Noted: 2024-10-09

## 2024-10-09 LAB
GLUCOSE SERPL-MCNC: 128 MG/DL (ref 65–140)
GLUCOSE SERPL-MCNC: 237 MG/DL (ref 65–140)
GLUCOSE SERPL-MCNC: 257 MG/DL (ref 65–140)
GLUCOSE SERPL-MCNC: 327 MG/DL (ref 65–140)

## 2024-10-09 PROCEDURE — 97112 NEUROMUSCULAR REEDUCATION: CPT

## 2024-10-09 PROCEDURE — 97116 GAIT TRAINING THERAPY: CPT

## 2024-10-09 PROCEDURE — 97530 THERAPEUTIC ACTIVITIES: CPT

## 2024-10-09 PROCEDURE — 97110 THERAPEUTIC EXERCISES: CPT

## 2024-10-09 PROCEDURE — 99232 SBSQ HOSP IP/OBS MODERATE 35: CPT | Performed by: STUDENT IN AN ORGANIZED HEALTH CARE EDUCATION/TRAINING PROGRAM

## 2024-10-09 PROCEDURE — 92523 SPEECH SOUND LANG COMPREHEN: CPT | Performed by: NURSE PRACTITIONER

## 2024-10-09 PROCEDURE — 97535 SELF CARE MNGMENT TRAINING: CPT

## 2024-10-09 PROCEDURE — 82948 REAGENT STRIP/BLOOD GLUCOSE: CPT

## 2024-10-09 RX ADMIN — ASPIRIN 81 MG 81 MG: 81 TABLET ORAL at 08:23

## 2024-10-09 RX ADMIN — CLOPIDOGREL 75 MG: 75 TABLET ORAL at 08:23

## 2024-10-09 RX ADMIN — METFORMIN HYDROCHLORIDE 750 MG: 750 TABLET, EXTENDED RELEASE ORAL at 17:16

## 2024-10-09 RX ADMIN — FAMOTIDINE 20 MG: 20 TABLET, FILM COATED ORAL at 08:23

## 2024-10-09 RX ADMIN — AMLODIPINE BESYLATE 5 MG: 5 TABLET ORAL at 20:50

## 2024-10-09 RX ADMIN — FAMOTIDINE 20 MG: 20 TABLET, FILM COATED ORAL at 17:16

## 2024-10-09 RX ADMIN — GLIPIZIDE 2.5 MG: 2.5 TABLET, EXTENDED RELEASE ORAL at 17:16

## 2024-10-09 RX ADMIN — SODIUM CHLORIDE SOLN NEBU 3% 4 ML: 3 NEBU SOLN at 19:49

## 2024-10-09 RX ADMIN — AMLODIPINE BESYLATE 5 MG: 5 TABLET ORAL at 08:23

## 2024-10-09 RX ADMIN — INSULIN LISPRO 6 UNITS: 100 INJECTION, SOLUTION INTRAVENOUS; SUBCUTANEOUS at 16:53

## 2024-10-09 RX ADMIN — LEVALBUTEROL HYDROCHLORIDE 1.25 MG: 1.25 SOLUTION RESPIRATORY (INHALATION) at 14:24

## 2024-10-09 RX ADMIN — PREDNISONE 5 MG: 5 TABLET ORAL at 08:23

## 2024-10-09 RX ADMIN — LEVALBUTEROL HYDROCHLORIDE 1.25 MG: 1.25 SOLUTION RESPIRATORY (INHALATION) at 08:23

## 2024-10-09 RX ADMIN — SODIUM CHLORIDE SOLN NEBU 3% 4 ML: 3 NEBU SOLN at 14:24

## 2024-10-09 RX ADMIN — ATORVASTATIN CALCIUM 40 MG: 40 TABLET, FILM COATED ORAL at 17:16

## 2024-10-09 RX ADMIN — LISINOPRIL 20 MG: 20 TABLET ORAL at 08:23

## 2024-10-09 RX ADMIN — INSULIN LISPRO 3 UNITS: 100 INJECTION, SOLUTION INTRAVENOUS; SUBCUTANEOUS at 21:06

## 2024-10-09 RX ADMIN — ATENOLOL 25 MG: 25 TABLET ORAL at 08:23

## 2024-10-09 RX ADMIN — LISINOPRIL 20 MG: 20 TABLET ORAL at 20:50

## 2024-10-09 RX ADMIN — GLIPIZIDE 2.5 MG: 2.5 TABLET, EXTENDED RELEASE ORAL at 08:23

## 2024-10-09 RX ADMIN — GUAIFENESIN 600 MG: 600 TABLET ORAL at 08:23

## 2024-10-09 RX ADMIN — GUAIFENESIN 600 MG: 600 TABLET ORAL at 17:16

## 2024-10-09 RX ADMIN — LEVALBUTEROL HYDROCHLORIDE 1.25 MG: 1.25 SOLUTION RESPIRATORY (INHALATION) at 19:49

## 2024-10-09 RX ADMIN — INSULIN LISPRO 8 UNITS: 100 INJECTION, SOLUTION INTRAVENOUS; SUBCUTANEOUS at 11:58

## 2024-10-09 RX ADMIN — ENOXAPARIN SODIUM 40 MG: 40 INJECTION SUBCUTANEOUS at 08:23

## 2024-10-09 RX ADMIN — MONTELUKAST 10 MG: 10 TABLET, FILM COATED ORAL at 21:06

## 2024-10-09 RX ADMIN — SODIUM CHLORIDE SOLN NEBU 3% 4 ML: 3 NEBU SOLN at 08:24

## 2024-10-09 NOTE — ASSESSMENT & PLAN NOTE
Lab Results   Component Value Date    HGBA1C 10.6 (H) 10/02/2024       Recent Labs     10/08/24  1614 10/08/24  2012 10/09/24  0700 10/09/24  1134   POCGLU 347* 263* 128 327*     Uncontrolled type 2 diabetes with A1C most recently of 10.6  Restarting home glipizide per IM and stopping Lantus  Wife tbrought in Home metformin XL 750mg pills  SSI and QID glucose checks  CCD

## 2024-10-09 NOTE — PROGRESS NOTES
"   10/09/24 1100   Pain Assessment   Pain Assessment Tool 0-10   Pain Score No Pain   Restrictions/Precautions   Precautions Bed/chair alarms;Fall Risk;Supervision on toilet/commode;Visual deficit  (R macular degeneration)   Weight Bearing Restrictions No   ROM Restrictions No   Cognition   Overall Cognitive Status WFL   Arousal/Participation Alert;Responsive;Cooperative   Attention Attends with cues to redirect   Orientation Level Oriented X4   Memory Within functional limits   Following Commands Follows one step commands without difficulty   Subjective   Subjective \"my legs are not as tired\"   Roll Left and Right   Type of Assistance Needed Independent   Physical Assistance Level No physical assistance   Roll Left and Right CARE Score 6   Sit to Lying   Type of Assistance Needed Independent   Physical Assistance Level No physical assistance   Sit to Lying CARE Score 6   Lying to Sitting on Side of Bed   Type of Assistance Needed Independent   Physical Assistance Level No physical assistance   Lying to Sitting on Side of Bed CARE Score 6   Sit to Stand   Type of Assistance Needed Incidental touching   Physical Assistance Level No physical assistance   Comment with RW   Sit to Stand CARE Score 4   Bed-Chair Transfer   Type of Assistance Needed Incidental touching   Physical Assistance Level No physical assistance   Comment with RW   Chair/Bed-to-Chair Transfer CARE Score 4   Car Transfer   Reason if not Attempted Environmental limitations   Car Transfer CARE Score 10   Walk 10 Feet   Type of Assistance Needed Physical assistance   Physical Assistance Level Total assistance   Comment Min A x 1 with RW on level and unlevel surfaces. Required WC follow of another. Unsteadiness noted when initiating gait.   Walk 10 Feet CARE Score 1   Walk 50 Feet with Two Turns   Type of Assistance Needed Physical assistance   Physical Assistance Level Total assistance   Comment Min A x 1 with RW on level and unlevel surfaces. Required " WC follow of another. Unsteadiness noted when initiating gait.   Walk 50 Feet with Two Turns CARE Score 1   Walk 150 Feet   Type of Assistance Needed Physical assistance   Physical Assistance Level Total assistance   Comment Min A x 1 with RW on level and unlevel surfaces. Required WC follow of another. Unsteadiness noted when initiating gait.   Walk 150 Feet CARE Score 1   Walking 10 Feet on Uneven Surfaces   Type of Assistance Needed Physical assistance   Physical Assistance Level Total assistance   Comment Min A x 1 with RW on level and unlevel surfaces. Required WC follow of another. Unsteadiness noted when initiating gait.   Walking 10 Feet on Uneven Surfaces CARE Score 1   Ambulation   Primary Mode of Locomotion Prior to Admission Walk   Distance Walked (feet) 301 ft  (301 and 130 ft)   Assist Device Roller Walker   Gait Pattern Inconsistant Marsha;Decreased foot clearance;Forward Flexion;Wide KADIE;Shuffle;Improper weight shift   Limitations Noted In Balance;Coordination;Device Management;Heel Strike;Endurance;Posture;Safety;Strength   Provided Assistance with: Balance;Trunk Support   Walk Assist Level Minimum Assist   Findings Min A x 1 with RW on level and unlevel surfaces. Required WC follow of another. Unsteadiness noted when initiating gait.   Does the patient walk? 2. Yes   Wheel 50 Feet with Two Turns   Type of Assistance Needed Independent   Physical Assistance Level No physical assistance   Comment Mod I level and unlevel surfaces, LE fatigue noted   Wheel 50 Feet with Two Turns CARE Score 6   Wheel 150 Feet   Type of Assistance Needed Independent   Physical Assistance Level No physical assistance   Comment Mod I level and unlevel surfaces, LE fatigue noted   Wheel 150 Feet CARE Score 6   Wheelchair mobility   Does the patient use a wheelchair? 1. Yes   Type of Wheelchair Used 1. Manual   Method Right upper extremity;Left upper extremity;Right lower extremity;Left lower extremity   Distance Level  Surface (feet) 220 ft   Distance Wheeled 3% Grade 12 ft   Findings Mod I level and unlevel surfaces, LE fatigue noted   Curb or Single Stair   Style negotiated Single stair   Type of Assistance Needed Incidental touching;Verbal cues   Physical Assistance Level No physical assistance   Comment CG with memo HR and non-reciprocal pattern. Cues for sequence and technique   1 Step (Curb) CARE Score 4   4 Steps   Type of Assistance Needed Incidental touching;Verbal cues   Physical Assistance Level No physical assistance   Comment CG with memo HR and non-reciprocal pattern. Cues for sequence and technique   4 Steps CARE Score 4   12 Steps   Reason if not Attempted Safety concerns   12 Steps CARE Score 88   Stairs   Type Stairs   # of Steps 7   Weight Bearing Precautions WBAT   Assist Devices Bilateral Rail   Findings CG with memo HR and non-reciprocal pattern. Cues for sequence and technique   Picking Up Object   Reason if not Attempted Safety concerns   Picking Up Object CARE Score 88   Toilet Transfer   Type of Assistance Needed Incidental touching   Physical Assistance Level No physical assistance   Comment with RW   Toilet Transfer CARE Score 4   Toilet Transfer   Surface Assessed Raised Toilet   Limitations Noted In Balance;Endurance;Safety;Sequencing;LE Strength   Adaptive Equipment Grab Bar   Therapeutic Interventions   Strengthening supine TE   Balance Gait and transfer training   Other Stair negotiation   Assessment   Treatment Assessment Pt agreeable to PT session this morning. No pain reported throughout session. CG for transfers with RW. Min A x 1 with RW for ambulation on level and unlevel surfaces for max distance of 301 ft. Required WC follow of another for ambulation. R knee buckling during ambulation when patient began to fatigue. CG with memo HR and non-reciprocal pattern for stair negotiation. Required cues for sequence and technique with stairs. Pt reports some fatigue in LEs, but improved since yesterday.  Supine TE for general LE strengthening. Gait and transfer training for increased balance, safety, and independence with functional mobility. Pt returned to room in recliner with alarms on and all needs within reach.   Problem List Decreased strength;Decreased range of motion;Decreased endurance;Impaired balance;Decreased coordination;Decreased safety awareness;Impaired vision   Barriers to Discharge Inaccessible home environment;Decreased caregiver support   PT Barriers   Physical Impairment Decreased strength;Decreased range of motion;Decreased endurance;Impaired balance;Decreased mobility;Decreased coordination;Decreased safety awareness;Impaired vision   Functional Limitation Car transfers;Ramp negotiation;Stair negotiation;Standing;Transfers;Walking;Wheelchair management   Plan   Treatment/Interventions Functional transfer training;LE strengthening/ROM;Elevations;Therapeutic exercise;Endurance training;Equipment eval/education;Bed mobility;Gait training   Progress Progressing toward goals   PT Therapy Minutes   PT Time In 1100   PT Time Out 1200   PT Total Time (minutes) 60   PT Mode of treatment - Individual (minutes) 60   PT Mode of treatment - Concurrent (minutes) 0   PT Mode of treatment - Group (minutes) 0   PT Mode of treatment - Co-treat (minutes) 0   PT Mode of Treatment - Total time(minutes) 60 minutes   PT Cumulative Minutes 536   Therapy Time missed   Time missed? No

## 2024-10-09 NOTE — PROGRESS NOTES
Progress Note - PMR   Name: Gold Van 78 y.o. male I MRN: [id number]  Unit/Bed#: -01 I Date of Admission: 10/4/2024   Date of Service: 10/9/2024 I Hospital Day: 5     Assessment & Plan  CVA (cerebrovascular accident) ([facility])  MRI revealed multiple foci of acute infarcts in the bilateral frontoparietal cortices and subcortical white matter left > right  Placed on DAPT and statin for secondary stroke prophylaxis per neurology and follow up with Neuro in 6 weeks (DAPT for 6 months)  Etiology thought to be coagulopathy secondary to COVID infection  CT CAP completed to rule out malignancy  TTE showing 50% EF but posterior wall hypokinesis and will need cardiology follow up  Patient endorses improvement in right sided weakness. Has ongoing dysmetria and incoordination in the right nahomi body  Physical, Occupational, and Speech therapy      Type 2 diabetes mellitus with complication, without long-term current use of insulin ([facility])  Lab Results   Component Value Date    HGBA1C 10.6 (H) 10/02/2024       Recent Labs     10/08/24  1614 10/08/24  2012 10/09/24  0700 10/09/24  1134   POCGLU 347* 263* 128 327*     Uncontrolled type 2 diabetes with A1C most recently of 10.6  Restarting home glipizide per IM and stopping Lantus  Wife tbrought in Home metformin XL 750mg pills  SSI and QID glucose checks  CCD    Essential hypertension  Atenolol 25 mg daily as well as amlodipine 5 mg twice daily and lisinopril 20 mg twice daily  Management per internal medicine and monitor blood pressures and therapy and on routine vitals  Hyperlipidemia  Continue statin  Sepsis due to pneumonia ([facility])  Met sepsis criteria on admission with tahcycardia, leukocytosis. Lactic acidosis. Also with acute repsiratory failure requiring CPAP-> BiPAP and eventually nasal canula  Blood cultures felt to be contaminants  CXR with left base opacity atelectasis vs pneumonia  S/P IV Ceftriaxone and azithromycin for 4 days  Sputum C&S was positive for  Pseudomonas-received cefepime/Azithromycin while inpatient, transitioning to Vantin for total of 2 more days (5 days course of treatment as recommended by pulmonology)   Pulm followed in acute care  IV steroids weaned and now on prior chronic prednisone regimen  Hyponatremia  Patient presents with hyponatremia sodium of 134 as of 10/3  Continue to monitor on biweekly labs or sooner if clinically indicated  Pruritus  Multiple years history of generalized pruritus with extensive work up in past  On alternating prednisone doses  Monitor for any changes  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HCC)  Suspected COPD exacerbation in setting of COVID infection  Initially requiring CPAP, transitioned to BiPAP, successfully weaned to room air  Appreciate input of pulmonology who followed  Received IV Solu-Medrol, transitioned to oral prednisone on 10/2  Continue robust regimen of meds for baseline COPD    COVID-19  Presented with shortness of breath and was in the hospital 9/24/24 for acute respiratory symptoms related to infection  Received remdesivir and baricitinib  Felt that strokes related COVID coagulopathy  Abnormal echocardiogram  Had TTE with posterior wall abnormalities/mild hypokinesis noted. EF 50%  Follow up with cardiology as an outpatient, would benefit from monitor and stress test  Gastroesophageal reflux disease without esophagitis  Continue pepcid  Impaired mobility and activities of daily living  Patient was evaluated by the rehabilitation team MD and an appropriate candidate for acute inpatient rehabilitation program at this time.  The patient will tolerate 3 hours/day 5 to 7 days/week of intensive physical, occupational and speech therapy in order to obtain goals for community discharge  Due to the patient's functional Compared to their baseline level of function in addition to their ongoing medical needs, the patient would benefit from daily supervision from a rehabilitation physician as well as rehabilitation nursing to  implement and adjust the medical as well as functional plan of care in order to meet the patient's goals.    Neuropathy  Neuropathy in feet and lower 1/3 of the tibia bilaterally  Undiagnosed previously, but to consider in therapies  Likely related to diabetes  Consider gabapentin, however not uncomfortable at this time  Foot drop, left  Patient states he has had issues with the foot for some time now  Presents with 1/5 ankle dorsiflexor strength on the left  Eval for bracing    Subjective   Gold Van is a 78 y.o. male with history of hypertension, type 2 diabetes, COPD, chronic steroid use who presented to the [facility] initially on 9/29/2024 for shortness of breath. Patient with a history of COPD and asthma and recent COVID infection with hospitalization. He arrived via ambulance with a CPAP on and then required a transition to BiPAP in the ER. He was initiated on IV cefepime for suspected pneumonia and also IV steroids. He met sepsis criteria for the tachycardia and leukocytosis in the setting of dental pneumonia. He had some difficulty with coordination of his right leg during a PT session as of 10/1 and neurology evaluated the patient and recommended stroke. An MRI of the brain did note multiple acute infarcts in the bilateral frontoparietal cortices and subcortical white matter changes without evidence of mass effect or hemorrhagic transformation. Patient was started on dual antiplatelet therapy and statin and additionally had a CT of the chest abdomen pelvis to rule out other causes of hypercoagulability, stroke, malignancy. Hypercoagulability it was thought to be related to his prior COVID infection. He was on telemetry without any issues and no atrial fibrillation noted. He did have a TTE that noted some wall motion abnormality in the posterior sections. Plan for outpatient monitor and stress test. Pulmonology also following the patient and he continued with cefepime and  azithromycin and is discharging on 2 additional days for coverage of Pseudomonas. This was grown in the sputum and was recommended by pulmonology. The patient was evaluated by the Rehabilitation team and deemed an appropriate candidate for comprehensive inpatient rehabilitation and admitted to the [facility] on 10/4/2024 1:24 PM     Chief Complaint: f/u stroke    Interval: Patient seen and evaluated during therapy session.  Overall is doing better today.  He did state yesterday he was not feeling as well and is up to it and did not perform as well with a single-point cane and had to utilize the rolling walker but today seems to have improved.  We did talk yesterday about her overall cognitive and physical fatigue with ongoing therapies and the role for breaks and also that the course for most healing and functional recovery for patients is not a linear progression.  All questions answered.  He is denies any fever, chills, nausea, vomiting, cough, shortness of breath or chest tightness this morning.  No constipation or diarrhea.    Objective :  Temp:  [97 °F (36.1 °C)-97.9 °F (36.6 °C)] 97 °F (36.1 °C)  HR:  [50-52] 52  BP: (129-130)/(60-81) 129/81  Resp:  [18] 18  SpO2:  [96 %] 96 %  O2 Device: None (Room air)    Functional Update:  Physical Therapy Occupational Therapy Speech Therapy   Weight Bearing Status: Weight Bearing as Tolerated  Transfers: Incidental Touching, Supervision  Bed Mobility: Supervision, Independent  Amulation Distance (ft): 190 feet  Ambulation: Minimal Assistance  Assistive Device for Ambulation: Single Point Cane  Wheelchair Mobility Distance:  (N/A)  Wheelchair Mobility:  (N/A)  Number of Stairs: 14  Assistive Device for Stairs: Right Hand Rail (single HR + SPC)  Stair Assistance: Minimal Assistance (CGA/Virgie)  Ramp: Moderate Assistance  Assistive Device for Ramp: Roller Walker  Discharge Recommendations: Home with:  DC Home with:: Family Support, Outpatient Physical Therapy   Eating:  Independent  Grooming: Supervision  Bathing: Minimal Assistance  Bathing: Minimal Assistance  Upper Body Dressing: Supervision  Lower Body Dressing: Minimal Assistance  Toileting: Incidental Touching  Tub/Shower Transfer: Minimal Assistance  Toilet Transfer: Incidental Touching  Cognition: Within Defined Limits  Orientation: Person, Place, Time, Situation               Physical Exam  Vitals reviewed.   Constitutional:       General: He is not in acute distress.  HENT:      Head: Normocephalic and atraumatic.      Right Ear: External ear normal.      Left Ear: External ear normal.      Nose: Nose normal. No rhinorrhea.      Mouth/Throat:      Mouth: Mucous membranes are moist.      Pharynx: Oropharynx is clear.   Eyes:      General: No scleral icterus.  Cardiovascular:      Rate and Rhythm: Normal rate.      Pulses: Normal pulses.   Pulmonary:      Effort: Pulmonary effort is normal. No respiratory distress.   Abdominal:      General: There is no distension.      Palpations: Abdomen is soft.   Musculoskeletal:      Right lower leg: Edema present.      Left lower leg: Edema present.   Skin:     General: Skin is warm and dry.   Neurological:      Mental Status: He is alert and oriented to person, place, and time.      Comments: Dysmetria in the right hemibody with left-sided foot drop and sensory deficits in the feet and distal tibial regions bilaterally   Psychiatric:         Mood and Affect: Mood normal.         Behavior: Behavior normal.             Scheduled Meds:  Current Facility-Administered Medications   Medication Dose Route Frequency Provider Last Rate    acetaminophen  650 mg Oral Q6H PRN Anniesamra Martines PA-C      albuterol  2 puff Inhalation Q4H PRN Annie L FelecialRANGEL      albuterol  2.5 mg Nebulization Q4H PRN Anniesamra Martines PA-C      amLODIPine  5 mg Oral BID Annie Martines PA-C      aspirin  81 mg Oral Daily Annie Martines PA-C      atenolol  25 mg Oral Daily Annie Martines PA-C       atorvastatin  40 mg Oral QPM Annie Izquierdol, PA-C      benzonatate  100 mg Oral TID PRN Annie Izquierdol, PA-C      Budeson-Glycopyrrol-Formoterol  2 puff Inhalation Q12H Anniesamra Izquierdol, PA-C      clopidogrel  75 mg Oral Daily Annie L Chelseynall, PA-C      enoxaparin  40 mg Subcutaneous Daily Annie L Chelseynall, PA-C      famotidine  20 mg Oral BID Annie Izquierdol, PA-C      glipiZIDE  2.5 mg Oral BID With Meals Annie Izquierdol, PA-C      guaiFENesin  600 mg Oral BID Anniesamra Izquierdol, PA-C      insulin lispro  1-6 Units Subcutaneous HS Annie Izquierdol, PA-C      insulin lispro  2-12 Units Subcutaneous TID AC Annie Izquierdol, PA-C      levalbuterol  1.25 mg Nebulization TID Annie Izquierdol, PA-C      lisinopril  20 mg Oral BID Annie Izquierdol, PA-C      LORazepam  0.5 mg Oral HS PRN Annie Barbosamahadl, PA-C      metFORMIN  750 mg Oral Daily With Dinner Christian Hendrickson MD      montelukast  10 mg Oral HS Annie STEPHANIE Martines, PA-C      polyethylene glycol  17 g Oral Daily PRN Christian Hendrickson MD      predniSONE  15 mg Oral Every Other Day Annie Barbosamhaadl, PA-C      predniSONE  5 mg Oral Every Other Day Annie Barbosamahadl, PA-C      senna-docusate sodium  1 tablet Oral HS PRN Christian Hendrickson MD      sodium chloride  4 mL Nebulization TID Annie BROWN Conrad, PA-C           Lab Results: I have reviewed the following results:  Results from last 7 days   Lab Units 10/05/24  0533 10/03/24  0427   HEMOGLOBIN g/dL 11.7* 12.0   HEMATOCRIT % 35.2* 36.5   WBC Thousand/uL 8.78 9.70   PLATELETS Thousands/uL 252 254     Results from last 7 days   Lab Units 10/05/24  0533 10/03/24  0427   BUN mg/dL 23 21   SODIUM mmol/L 133* 134*   POTASSIUM mmol/L 3.9 4.0   CHLORIDE mmol/L 103 101   CREATININE mg/dL 0.83 0.91   AST U/L 13  --    ALT U/L 21  --               Brandon Munoz, DO  Physical Medicine and Rehabilitation  [facility]    I have spent a total time of 35 minutes in caring for this patient on the  day of the visit/encounter including Counseling / Coordination of care, Documenting in the medical record, and Communicating with other healthcare professionals .

## 2024-10-09 NOTE — PROGRESS NOTES
10/09/24 1324   Pain Assessment   Pain Assessment Tool 0-10   Pain Score No Pain   Restrictions/Precautions   Precautions Bed/chair alarms;Fall Risk;Supervision on toilet/commode;Visual deficit   Sit to Stand   Type of Assistance Needed Incidental touching  (CG)   Comment RW   Sit to Stand CARE Score 4   Bed-Chair Transfer   Type of Assistance Needed Incidental touching  (CG)   Comment RW   Chair/Bed-to-Chair Transfer CARE Score 4   Cognition   Overall Cognitive Status WFL   Arousal/Participation Alert;Responsive;Cooperative   Attention Attends with cues to redirect   Orientation Level Oriented X4   Memory Within functional limits   Following Commands Follows one step commands without difficulty   Additional Activities   Additional Activities Comments Pt completed fxl mobility to/from hospital room and OT room with RW and CG with fair balance; as fatigue increases and walking further distances, greater concerns noted. Pt completed three rounds of card game rummy while seated in chair at table to increase attention to task and activity tolerance. Pt completed BUE coordination with fine motor skills and in hand manipulation to hold cards, shuffle and deal cards.   Assessment   Treatment Assessment Pt agreeable to OT session the PM, recieved seated upright in recliner. pt completed fxl mobility/transfers with RW and neuroreed and coordination of BUE. See repective section of note for further detail. Pt reports R side being weaker but feels much better than yesterday. Barriers the same as listed in note earlier today. Pt is progressing toward goals and will benefit from continued skilled OT services to increase independence of daily tasks.   Problem List Decreased strength;Decreased range of motion;Decreased endurance;Impaired balance;Decreased coordination;Decreased safety awareness;Impaired vision   Plan   Treatment/Interventions ADL retraining;Functional transfer training;LE strengthening/ROM;Therapeutic  exercise;Endurance training;Patient/family training;Equipment eval/education;Compensatory technique education;OT   Progress Progressing toward goals   OT Therapy Minutes   OT Time In 1324   OT Time Out 1417   OT Total Time (minutes) 53   OT Mode of treatment - Individual (minutes) 53

## 2024-10-09 NOTE — PLAN OF CARE
Problem: Neurological Deficit  Goal: Neurological status is stable or improving  Description: Interventions:  - Monitor and assess patient's level of consciousness, motor function, sensory function, and level of assistance needed for ADLs.   - Monitor and report changes from baseline. Collaborate with interdisciplinary team to initiate plan and implement interventions as ordered.   - Provide and maintain a safe environment.  - Consider seizure precautions.  - Consider fall precautions.  - Consider aspiration precautions.  - Consider bleeding precautions.  Outcome: Progressing     Problem: Nutrition  Goal: Nutrition/Hydration status is improving  Description: Monitor and assess patient's nutrition/hydration status for malnutrition (ex- brittle hair, bruises, dry skin, pale skin and conjunctiva, muscle wasting, smooth red tongue, and disorientation). Collaborate with interdisciplinary team and initiate plan and interventions as ordered.  Monitor patient's weight and dietary intake as ordered or per policy. Utilize nutrition screening tool and intervene per policy. Determine patient's food preferences and provide high-protein, high-caloric foods as appropriate.     - Assist patient with eating.  - Allow adequate time for meals.  - Encourage patient to take dietary supplement as ordered.  - Collaborate with clinical nutritionist.  - Include patient/family/caregiver in decisions related to nutrition.  Outcome: Progressing

## 2024-10-09 NOTE — PROGRESS NOTES
10/09/24 0927   Pain Assessment   Pain Assessment Tool 0-10   Pain Score No Pain   Restrictions/Precautions   Precautions Bed/chair alarms;Fall Risk;Supervision on toilet/commode;Visual deficit  (R macular degeneration)   Oral Hygiene   Type of Assistance Needed Independent   Comment w/c level at sink   Oral Hygiene CARE Score 6   Grooming   Able To Comb/Brush Hair;Wash/Dry Face;Brush/Clean Teeth;Wash/Dry Hands   Findings w/c level at sink   Shower/Bathe Self   Type of Assistance Needed Supervision   Shower/Bathe Self CARE Score 4   Bathing   Assessed Bath Style Shower   Anticipated D/C Bath Style Shower   Able to Gather/Transport No   Able to Adjust Water Temperature Yes   Able to Wash/Rinse/Dry (body part) Left Arm;Right Arm;L Upper Leg;R Upper Leg;L Lower Leg/Foot;R Lower Leg/Foot;Chest;Abdomen;Perineal Area;Buttocks   Limitations Noted in Endurance;Safety;Strength   Positioning Seated;Standing   Adaptive Equipment Hand Held Shower;Shower Seat;Shower Bars   Tub/Shower Transfer   Limitations Noted In Endurance;Safety;Balance;UE Strength;LE Strength   Adaptive Equipment Grab Bars;Seat with out Back   Assessed Shower   Findings CG   Upper Body Dressing   Type of Assistance Needed Set-up / clean-up   Upper Body Dressing CARE Score 5   Lower Body Dressing   Type of Assistance Needed Supervision   Lower Body Dressing CARE Score 4   Putting On/Taking Off Footwear   Type of Assistance Needed Adaptive equipment;Supervision   Putting On/Taking Off Footwear CARE Score 4   Dressing/Undressing Clothing   Remove UB Clothes Pullover Shirt   Don UB Clothes Pullover Shirt   Remove LB Clothes Shorts;Socks;Shoes   Don LB Clothes Pants;Socks;Shoes   Limitations Noted In Balance;Endurance;Strength;ROM;Safety   Adaptive Equipment LH Shoehorn;Sock Aide   Positioning Supported Sit;Standing   Sit to Stand   Type of Assistance Needed Incidental touching  (CG)   Comment RW   Sit to Stand CARE Score 4   Bed-Chair Transfer   Type of  Assistance Needed Incidental touching  (CG)   Comment RW   Chair/Bed-to-Chair Transfer CARE Score 4   Cognition   Overall Cognitive Status WFL   Arousal/Participation Alert;Responsive;Cooperative   Attention Attends with cues to redirect   Orientation Level Oriented X4   Memory Within functional limits   Following Commands Follows one step commands without difficulty   Additional Activities   Additional Activities Comments Pt completed fxl mobility to/from room and shower with RW and CG with fair balance; as fatigue increases, greater concerns noted   Other Comments   Assessment Pt participates in 62 minutes concurrent treatmetn focusing on ADLs and related transfers./ mobility with simialr goals as another patient to increase strength and activity tolerance   Assessment   Treatment Assessment Pt agreeable to OT session this AM and recieved seated upright in recliner. Pt completed ADLs, grooming tasks and fxl mobility/transfers with RW this session. Pt requires CG and supervision due to decreased balance, safety, endurance, and strength/ROM/coordination especially of RUE/RLE with hx of L foot drop as well. Occassional VCs to maintain safety throughout session and continuous VCs for energy conservation and frequent rest breaks. Pt is progressing toward goals and will benefit from continued skilled OT services to increase independence of daily tasks.   Problem List Decreased strength;Decreased range of motion;Decreased endurance;Impaired balance;Decreased coordination;Decreased safety awareness;Impaired vision   Plan   Treatment/Interventions ADL retraining;Functional transfer training;LE strengthening/ROM;Therapeutic exercise;Endurance training;Patient/family training;Equipment eval/education;Compensatory technique education;OT   Progress Progressing toward goals   OT Therapy Minutes   OT Time In 0927   OT Time Out 1029   OT Total Time (minutes) 62   OT Mode of treatment - Concurrent (minutes) 62   Therapy Time  missed   Time missed? No

## 2024-10-09 NOTE — PROGRESS NOTES
[facility] SLP INITIAL EVALUATION    10/09/24 1200   Patient Data   Etiologic Diagnosis Chief Complaint: CVA   Support System   Name Stacia   Relationship spouse   Multiple Support Systems   (son ( Cornell) resides in [place], daughter ( Sparkle) resides [facility], and daughter (Jenifer) [facility].)   Baseline Information   Vocation   (retired )   Transportation    Prior IADL Participation   Money Management   (partial)   Meal Preparation Partial Participation   Laundry Partial Participation   Home Cleaning Partial Participation   Prior Level of Function   Prior Assistance Needed for   (splits medication management with wife)   Restrictions/Precautions   Precautions Bed/chair alarms;Fall Risk;Supervision on toilet/commode;Visual deficit   Pain Assessment   Pain Assessment Tool 0-10   Pain Score No Pain   Eating Assessment   Swallow Precautions   (No difficulty reported by patient or staff)   Bedside Swallow Results No  (inpatient SLP completed a screen, patient tolerating regular solids and thin liquids PTA at [facility].)   VBS Study Results No   QI: Swallowing/Nutritional Status Therapeutic Diet   Current Diet Regular;Thin   Type of Assistance Needed Independent   Physical Assistance Level No physical assistance   Eating CARE Score 6       Recommendations   Diet Solid Recommendation Regular consistency   Diet Liquid Recommendation Thin liquid    Recommended Form of Meds As desired;As tolerated   Further Evaluations   (No swallow evaluation indicated at this time- continue regular solids/thin liquids as tolerated.      Comprehension   Assist Devices Glasses   Auditory   (no difficulty hearing. tinnitus)   Comprehension (FIM) 6 - Has only MILD difficulty with complex/abstract info   Expression   Expression (FIM) 7 - Expresses complex/abstract ideas in a reasonable time w/o devices or helper.   Social Interaction   Social Interaction (FIM) 7 - Interacts appropriately without  assistive device, medication or helper   Problem Solving   Problem solving (FIM) 6 - Solves complex problems BUT requires extra time   Memory   Memory (FIM) 6 - Recognizes with extra time   Cognition   Orientation Level Oriented X4       Cognitive Linguisitic Assessments   Repeatable Battery for the Assessment of Neuropsychological Status (RBANS) RBANS completed today    The Repeatable Battery for the Assessment of Neuropsychological Status (RBANS) is a brief, individually-administered assessment which measures attention, language, visuospatial/constructional abilities, and immediate & delayed memory. The RBANS is intended for use with adolescents to adults, ages 12 to 89 years. The following results were obtained during the administration of the assessment.    Form: A    Cognitive Domain/Subtest: Index Score: Percentile Rank: Classification:   IMMEDIATE MEMORY 94 34%ile Average        1. List Learning (22/40)        2. Story Memory (19/24)       VISUOSPATIAL/  CONSTRUCTIONAL 121 92%ile Superior        3. Figure Copy (20/20)        4. Line Orientation (18/20)       LANGUAGE 90 25%ile Average        5. Picture Naming (10/10)        6. Semantic Fluency (14/40)       ATTENTION 72 3%ile Borderline        7. Digit Span (8/16)        8. Coding (21/89)       DELAYED MEMORY 107 68%ile Average        9. List Recall (5/10)        10. List Recognition (20/20)        11. Story Recall (8/12)        12. Figure Recall (15/20)         Sum of Index Scores:  484   Total Score:  95   Percentile: 37%ile   Classification: Average     Pt presenting with mild attention deficits; however, patient denies any changes or difficulty he has observed. Informally, patient not demonstrating any difficulty conversing with SLP in complex conversational tasks. He demonstrates a good understanding of his medical situation. He does also verbalize good problem solving skills surrounding activities he think would be safe or unsafe and modifications as  "needed. Will work towards multiple areas of attention with dual tasks in therapy to maximize cognition as based on results of other areas of testing I do not feel this is his baseline.    Written Expression   Dominant Hand Right   Written Expression   (Pt reports changes in handwriting. Pt denies any spelling or grammar difficulty.)   Motor Speech Evaluation   Dysarthria No   Apraxia None present      Vision   Vision Comments R eye macular degeneration. Increased \"blurred vision\" in R eye since stroke. L cataract removed ~ 6 weeks PTA. Also known R catarect pending ?removal   Discharge Information   Patient's Discharge Plan return back home with wife.   Patient's Rehab Expectations \"I want to be able to do what I did before\"   SLP Therapy Minutes   SLP Time In 1230   SLP Time Out 1324   SLP Total Time (minutes) 54   SLP Mode of treatment - Individual (minutes) 54   SLP Mode of treatment - Concurrent (minutes) 0   SLP Mode of treatment - Group (minutes) 0   SLP Mode of treatment - Co-treat (minutes) 0   SLP Mode of Treatment - Total time(minutes) 54 minutes   SLP Cumulative Minutes 54   Cumulative Minutes   Cumulative therapy minutes 695     "

## 2024-10-09 NOTE — PROGRESS NOTES
"   10/09/24 0709   Pain Assessment   Pain Assessment Tool 0-10   Pain Score 3   Pain Location/Orientation Orientation: Right;Location: Leg  (wound located on R lower medial leg)   Restrictions/Precautions   Precautions Bed/chair alarms;Fall Risk;Supervision on toilet/commode;Visual deficit  (R macular degeneration)   Weight Bearing Restrictions No   ROM Restrictions No   Cognition   Overall Cognitive Status WFL   Arousal/Participation Alert;Responsive;Cooperative   Attention Attends with cues to redirect   Orientation Level Oriented X4   Memory Within functional limits   Following Commands Follows one step commands without difficulty   Subjective   Subjective \"I feel better today than yesterday\"   Roll Left and Right   Comment Pt OOB   Sit to Lying   Comment Pt OOB   Lying to Sitting on Side of Bed   Comment Pt OOB   Sit to Stand   Type of Assistance Needed Incidental touching   Physical Assistance Level No physical assistance   Comment with RW   Sit to Stand CARE Score 4   Bed-Chair Transfer   Type of Assistance Needed Incidental touching   Physical Assistance Level No physical assistance   Comment with RW   Chair/Bed-to-Chair Transfer CARE Score 4   Car Transfer   Reason if not Attempted Environmental limitations   Car Transfer CARE Score 10   Walk 10 Feet   Type of Assistance Needed Physical assistance   Physical Assistance Level Total assistance   Comment Min A x 1 with RW on level and unlevel surfaces. Required WC follow of another. Unsteadiness noted when initiating gait.   Walk 10 Feet CARE Score 1   Walk 50 Feet with Two Turns   Type of Assistance Needed Physical assistance   Physical Assistance Level Total assistance   Comment Min A x 1 with RW on level and unlevel surfaces. Required WC follow of another. Unsteadiness noted when initiating gait.   Walk 50 Feet with Two Turns CARE Score 1   Walk 150 Feet   Reason if not Attempted Safety concerns   Walk 150 Feet CARE Score 88   Walking 10 Feet on Uneven " Surfaces   Type of Assistance Needed Physical assistance   Physical Assistance Level Total assistance   Comment Min A x 1 with RW on level and unlevel surfaces. Required WC follow of another. Unsteadiness noted when initiating gait.   Walking 10 Feet on Uneven Surfaces CARE Score 1   Ambulation   Primary Mode of Locomotion Prior to Admission Walk   Distance Walked (feet) 130 ft  (120, 130 ft, short distances around PT gym)   Assist Device Roller Walker   Gait Pattern Inconsistant Marsha;Decreased foot clearance;Forward Flexion;Wide KADIE;Shuffle;Improper weight shift   Limitations Noted In Balance;Coordination;Device Management;Heel Strike;Endurance;Posture;Safety;Strength   Provided Assistance with: Balance;Trunk Support   Walk Assist Level Minimum Assist   Findings Min A x 1 with RW on level and unlevel surfaces. Required WC follow of another. Unsteadiness noted when initiating gait.   Does the patient walk? 2. Yes   Wheelchair mobility   Does the patient use a wheelchair? 1. Yes   Curb or Single Stair   Reason if not Attempted Safety concerns   1 Step (Curb) CARE Score 88   Picking Up Object   Reason if not Attempted Safety concerns   Picking Up Object CARE Score 88   Toilet Transfer   Comment not needed during session   Therapeutic Interventions   Strengthening seated TE   Balance gait and transfer training   Equipment Use   Glimpse lvl 1, 15 min   Assessment   Treatment Assessment Pt agreeable to PT session this morning. Pain in L lower and medial leg where wound is present, 3/10. Pt CG with RW for transfers. Total assist for ambulation with RW due to min A x 1 and WC follow of another. Ambulated with RW on level and unlevel surfaces for max distance of 130 ft. Demonstrated with unsteadinedd when initiating gait. Seated TE for general LE strengthening. Gait and transfer training for increased balance, safety, and independence with functional mobility. Pt quickly fatigues with activity and demonstrated decreased  endurance. Pt appropriate for concurrent therapy to increase motivation and encouragement between patients with similar deficits while comleting therapy session. Pt returned to room in recliner with alarms on and all needs within reach.   Problem List Decreased strength;Decreased endurance;Impaired balance;Decreased mobility;Decreased coordination;Decreased safety awareness;Impaired vision   Barriers to Discharge Inaccessible home environment;Decreased caregiver support   PT Barriers   Physical Impairment Decreased strength;Decreased range of motion;Decreased endurance;Impaired balance;Decreased mobility;Decreased coordination;Decreased safety awareness;Impaired vision   Functional Limitation Car transfers;Ramp negotiation;Stair negotiation;Standing;Transfers;Walking;Wheelchair management   Plan   Treatment/Interventions Functional transfer training;LE strengthening/ROM;Elevations;Therapeutic exercise;Endurance training;Equipment eval/education;Bed mobility;Gait training   Progress Progressing toward goals   PT Therapy Minutes   PT Time In 0709   PT Time Out 0810   PT Total Time (minutes) 61   PT Mode of treatment - Individual (minutes) 21   PT Mode of treatment - Concurrent (minutes) 40   PT Mode of treatment - Group (minutes) 0   PT Mode of treatment - Co-treat (minutes) 0   PT Mode of Treatment - Total time(minutes) 61 minutes   PT Cumulative Minutes 476   Therapy Time missed   Time missed? No

## 2024-10-09 NOTE — PROGRESS NOTES
Progress Note - Gold Van 78 y.o. male MRN: [id number]    Unit/Bed#: [facility] 216-01 Encounter: [id number]        Subjective:   Patient seen and examined at bedside after reviewing the chart and discussing the case with the caring staff.      Patient examined at bedside.  Patient denies any acute symptoms today.  States he is doing well in therapy.     Physical Exam   Vitals: Blood pressure 129/81, pulse (!) 52, temperature (!) 97 °F (36.1 °C), temperature source Temporal, resp. rate 18, height 6' (1.829 m), weight 70.6 kg (155 lb 10.3 oz), SpO2 96%.,Body mass index is 21.11 kg/m².  Constitutional: Patient in no acute distress.  HEENT: PERR, EOMI, MMM.  Cardiovascular: Normal rate and regular rhythm.    Pulmonary/Chest: Effort normal and breath sounds normal.   Abdomen: Soft, + BS, NT.    Assessment/Plan:  Gold Van is a(n) 78 y.o. male with CVA.     Hypertension.  Patient is on lisinopril 20 mg twice daily, amlodipine 5 mg twice daily, atenolol 25 mg daily.  Type 2 diabetes mellitus.  Hgb A1c 10.6% on 10/2/24. Restart home glipizide 2.5 mg twice daily 10/4 and and discontinue Lantus.  Wife brought in metformin  mg daily.  SSI with accu-checks ac/hs.  Carb controlled diet.  GERD.  Patient is on famotidine 20 mg twice daily.   Chronic generalized pruritus.  Ongoing past 5-6 years.  Pt/wife reports receiving extensive workup in the past from various specialists including dermatology and allergist.  Continue alternating prednisone 5 mg and 15 mg daily.    COPD/asthma.  Arslantri brought from home.  Continue Singulair 10 mg nightly and Xopenox/sodium chloride neb TID.  Pt follows with [facility] pulmonology.    Pneumonia.  Sputum C&S on 10/1 positive for pseudomonas.  Patient was treated with IV cefepime and then transitioned to PO Vantin to complete 5-day course of antibiotics as recommended by pulmonology.  Continue Vantin 400 mg twice daily x 4 more doses (thru 10/6), Mucinex 600 mg twice daily,  Xopenox/sodium chloride neb TID.  Tessalon perles as needed.  Incentive spirometry.   Abnormal echocardiogram.  TTE on 10/1 with posterior wall motion abnormality.  Cardiology saw patient on acute care and recommended outpatient stress test.  Hyponatremia.  Level 134 -> 133 on 10/5/24.  Cont to monitor.    Malnutrition.  Adult Malnutrition type: Chronic illness.  Adult Degree of Malnutrition: Malnutrition of moderate degree. Malnutrition Characteristics: Weight loss, Inadequate energy, Fat loss.  Dietician following.  Pain and bowel regimen.  As per physiatry.  Patient started on Senokot S daily as needed and MiraLAX as needed 10/5/2024.  CVA.   Neurology recommended DAPT with Plavix/ASA x90 days total (~12/29/24) then ASA monotherapy.  Cont atorvastatin 40 mg daily.  Follow-up with neurology in 6 weeks.  Follow-up with cardiology for Zio patch and/or loop recorder on discharge.  Patient is receiving intensive PT OT with management as per physiatry.     Anticipated date of discharge.  TBD.    The patient was discussed with Dr. Hendrickson and he is in agreement with the above note.

## 2024-10-09 NOTE — NURSING NOTE
Patient with generalized weakness right more then left. Reports no pain. Able to ambulate contact guard with cane in room to bathroom. Sitting in recliner for all meals. No problems swallowing. Educated on importance of repositioning self in recliner. Continued stroke education provided. Will continue to monitor and follow plan of care.

## 2024-10-09 NOTE — PLAN OF CARE
Problem: Neurological Deficit  Goal: Neurological status is stable or improving  Description: Interventions:  - Monitor and assess patient's level of consciousness, motor function, sensory function, and level of assistance needed for ADLs.   - Monitor and report changes from baseline. Collaborate with interdisciplinary team to initiate plan and implement interventions as ordered.   - Provide and maintain a safe environment.  - Consider seizure precautions.  - Consider fall precautions.  - Consider aspiration precautions.  - Consider bleeding precautions.  Outcome: Progressing     Problem: Communication Impairment  Goal: Ability to express needs and understand communication  Description: Assess patient's communication skills and ability to understand information.  Patient will demonstrate use of effective communication techniques, alternative methods of communication and understanding even if not able to speak.     - Encourage communication and provide alternate methods of communication as needed.  - Collaborate with case management/ for discharge needs.  - Include patient/family/caregiver in decisions related to communication.  Outcome: Progressing     Problem: Nutrition  Goal: Nutrition/Hydration status is improving  Description: Monitor and assess patient's nutrition/hydration status for malnutrition (ex- brittle hair, bruises, dry skin, pale skin and conjunctiva, muscle wasting, smooth red tongue, and disorientation). Collaborate with interdisciplinary team and initiate plan and interventions as ordered.  Monitor patient's weight and dietary intake as ordered or per policy. Utilize nutrition screening tool and intervene per policy. Determine patient's food preferences and provide high-protein, high-caloric foods as appropriate.     - Assist patient with eating.  - Allow adequate time for meals.  - Encourage patient to take dietary supplement as ordered.  - Collaborate with clinical  nutritionist.  - Include patient/family/caregiver in decisions related to nutrition.  Outcome: Progressing     Problem: Knowledge Deficit  Goal: Patient/family/caregiver demonstrates understanding of disease process, treatment plan, medications, and discharge instructions  Description: Complete learning assessment and assess knowledge base.  Interventions:  - Provide teaching at level of understanding  - Provide teaching via preferred learning methods  Outcome: Progressing

## 2024-10-10 LAB
GLUCOSE SERPL-MCNC: 138 MG/DL (ref 65–140)
GLUCOSE SERPL-MCNC: 282 MG/DL (ref 65–140)
GLUCOSE SERPL-MCNC: 316 MG/DL (ref 65–140)
GLUCOSE SERPL-MCNC: 344 MG/DL (ref 65–140)

## 2024-10-10 PROCEDURE — 97112 NEUROMUSCULAR REEDUCATION: CPT | Performed by: PHYSICAL THERAPIST

## 2024-10-10 PROCEDURE — 97110 THERAPEUTIC EXERCISES: CPT | Performed by: PHYSICAL THERAPIST

## 2024-10-10 PROCEDURE — 97530 THERAPEUTIC ACTIVITIES: CPT | Performed by: PHYSICAL THERAPIST

## 2024-10-10 PROCEDURE — 82948 REAGENT STRIP/BLOOD GLUCOSE: CPT

## 2024-10-10 PROCEDURE — 97110 THERAPEUTIC EXERCISES: CPT

## 2024-10-10 PROCEDURE — 92507 TX SP LANG VOICE COMM INDIV: CPT | Performed by: NURSE PRACTITIONER

## 2024-10-10 PROCEDURE — 97116 GAIT TRAINING THERAPY: CPT | Performed by: PHYSICAL THERAPIST

## 2024-10-10 PROCEDURE — 97535 SELF CARE MNGMENT TRAINING: CPT

## 2024-10-10 PROCEDURE — 99232 SBSQ HOSP IP/OBS MODERATE 35: CPT | Performed by: STUDENT IN AN ORGANIZED HEALTH CARE EDUCATION/TRAINING PROGRAM

## 2024-10-10 RX ADMIN — GLIPIZIDE 2.5 MG: 2.5 TABLET, EXTENDED RELEASE ORAL at 08:08

## 2024-10-10 RX ADMIN — SODIUM CHLORIDE SOLN NEBU 3% 4 ML: 3 NEBU SOLN at 20:20

## 2024-10-10 RX ADMIN — CLOPIDOGREL 75 MG: 75 TABLET ORAL at 08:07

## 2024-10-10 RX ADMIN — ATENOLOL 25 MG: 25 TABLET ORAL at 08:07

## 2024-10-10 RX ADMIN — GUAIFENESIN 600 MG: 600 TABLET ORAL at 08:06

## 2024-10-10 RX ADMIN — MONTELUKAST 10 MG: 10 TABLET, FILM COATED ORAL at 21:29

## 2024-10-10 RX ADMIN — GLIPIZIDE 2.5 MG: 2.5 TABLET, EXTENDED RELEASE ORAL at 16:57

## 2024-10-10 RX ADMIN — LISINOPRIL 20 MG: 20 TABLET ORAL at 08:07

## 2024-10-10 RX ADMIN — ENOXAPARIN SODIUM 40 MG: 40 INJECTION SUBCUTANEOUS at 08:05

## 2024-10-10 RX ADMIN — AMLODIPINE BESYLATE 5 MG: 5 TABLET ORAL at 21:29

## 2024-10-10 RX ADMIN — INSULIN LISPRO 4 UNITS: 100 INJECTION, SOLUTION INTRAVENOUS; SUBCUTANEOUS at 21:30

## 2024-10-10 RX ADMIN — LISINOPRIL 20 MG: 20 TABLET ORAL at 21:29

## 2024-10-10 RX ADMIN — PREDNISONE 15 MG: 5 TABLET ORAL at 08:06

## 2024-10-10 RX ADMIN — LEVALBUTEROL HYDROCHLORIDE 1.25 MG: 1.25 SOLUTION RESPIRATORY (INHALATION) at 14:28

## 2024-10-10 RX ADMIN — ATORVASTATIN CALCIUM 40 MG: 40 TABLET, FILM COATED ORAL at 17:16

## 2024-10-10 RX ADMIN — METFORMIN HYDROCHLORIDE 750 MG: 750 TABLET, EXTENDED RELEASE ORAL at 16:57

## 2024-10-10 RX ADMIN — SODIUM CHLORIDE SOLN NEBU 3% 4 ML: 3 NEBU SOLN at 07:38

## 2024-10-10 RX ADMIN — FAMOTIDINE 20 MG: 20 TABLET, FILM COATED ORAL at 17:16

## 2024-10-10 RX ADMIN — INSULIN LISPRO 8 UNITS: 100 INJECTION, SOLUTION INTRAVENOUS; SUBCUTANEOUS at 16:57

## 2024-10-10 RX ADMIN — ASPIRIN 81 MG 81 MG: 81 TABLET ORAL at 08:08

## 2024-10-10 RX ADMIN — BUDESONIDE, GLYCOPYRROLATE, AND FORMOTEROL FUMARATE 2 PUFF: 160; 9; 4.8 AEROSOL, METERED RESPIRATORY (INHALATION) at 06:48

## 2024-10-10 RX ADMIN — BUDESONIDE, GLYCOPYRROLATE, AND FORMOTEROL FUMARATE 2 PUFF: 160; 9; 4.8 AEROSOL, METERED RESPIRATORY (INHALATION) at 18:13

## 2024-10-10 RX ADMIN — FAMOTIDINE 20 MG: 20 TABLET, FILM COATED ORAL at 08:08

## 2024-10-10 RX ADMIN — AMLODIPINE BESYLATE 5 MG: 5 TABLET ORAL at 08:07

## 2024-10-10 RX ADMIN — LEVALBUTEROL HYDROCHLORIDE 1.25 MG: 1.25 SOLUTION RESPIRATORY (INHALATION) at 07:38

## 2024-10-10 RX ADMIN — LEVALBUTEROL HYDROCHLORIDE 1.25 MG: 1.25 SOLUTION RESPIRATORY (INHALATION) at 20:20

## 2024-10-10 RX ADMIN — GUAIFENESIN 600 MG: 600 TABLET ORAL at 17:15

## 2024-10-10 RX ADMIN — INSULIN LISPRO 8 UNITS: 100 INJECTION, SOLUTION INTRAVENOUS; SUBCUTANEOUS at 11:57

## 2024-10-10 RX ADMIN — SODIUM CHLORIDE SOLN NEBU 3% 4 ML: 3 NEBU SOLN at 14:28

## 2024-10-10 NOTE — ASSESSMENT & PLAN NOTE
Lab Results   Component Value Date    HGBA1C 10.6 (H) 10/02/2024       Recent Labs     10/09/24  1134 10/09/24  1609 10/09/24  1948 10/10/24  0701   POCGLU 327* 257* 237* 138     Uncontrolled type 2 diabetes with A1C most recently of 10.6  Restarting home glipizide per IM and stopping Lantus  Wife tbrought in Home metformin XL 750mg pills  SSI and QID glucose checks  CCD

## 2024-10-10 NOTE — ASSESSMENT & PLAN NOTE
Malnutrition Findings:   Adult Malnutrition type: Chronic illness  Adult Degree of Malnutrition: Malnutrition of moderate degree  Malnutrition Characteristics: Weight loss, Inadequate energy, Fat loss                  360 Statement: Malnutrition related to inadequate energy intake as evidenced by 15#(9%) weight loss from 8/7/# to 10/4/#, subcutaneous fat loss orbital/cheek region and extremities, <75% energy intake compared to estimated needs >1 month.    BMI Findings:           Body mass index is 21.11 kg/m².

## 2024-10-10 NOTE — PROGRESS NOTES
Progress Note - Gold Van 78 y.o. male MRN: [id number]    Unit/Bed#: [facility] 216-01 Encounter: [id number]        Subjective:   Patient seen and examined at bedside after reviewing the chart and discussing the case with the caring staff.      Patient examined at bedside.  Patient denies any acute symptoms today.     Physical Exam   Vitals: Blood pressure 119/57, pulse (!) 53, temperature 98.1 °F (36.7 °C), temperature source Temporal, resp. rate 16, height 6' (1.829 m), weight 70.6 kg (155 lb 10.3 oz), SpO2 94%.,Body mass index is 21.11 kg/m².  Constitutional: Patient in no acute distress.  HEENT: PERR, EOMI, MMM.  Cardiovascular: Normal rate and regular rhythm.    Pulmonary/Chest: Effort normal and breath sounds normal.   Abdomen: Soft, + BS, NT.    Assessment/Plan:  Gold Van is a(n) 78 y.o. male with CVA.     Hypertension.  Patient is on lisinopril 20 mg twice daily, amlodipine 5 mg twice daily, atenolol 25 mg daily.  Type 2 diabetes mellitus.  Hgb A1c 10.6% on 10/2/24.  Restart home glipizide 2.5 mg twice daily 10/4 and and discontinue Lantus.  Wife brought in metformin  mg daily.  SSI with accu-checks ac/hs.  Carb controlled diet.  GERD.  Patient is on famotidine 20 mg twice daily.   Chronic generalized pruritus.  Ongoing past 5-6 years.  Pt/wife reports receiving extensive workup in the past from various specialists including dermatology and allergist.  Continue alternating prednisone 5 mg and 15 mg daily.    COPD/asthma.  Arslantri brought from home.  Continue Singulair 10 mg nightly and Xopenox/sodium chloride neb TID.  Pt follows with [facility] pulmonology.    Pneumonia.  Sputum C&S on 10/1 positive for pseudomonas.  Patient was treated with IV cefepime and then transitioned to PO Vantin to complete 5-day course of antibiotics as recommended by pulmonology.  Continue Vantin 400 mg twice daily x 4 more doses (thru 10/6), Mucinex 600 mg twice daily, Xopenox/sodium chloride neb TID.  Tessalon perles  as needed.  Incentive spirometry.   Abnormal echocardiogram.  TTE on 10/1 with posterior wall motion abnormality.  Cardiology saw patient on acute care and recommended outpatient stress test.  Hyponatremia.  Level 134 -> 133 on 10/5/24.  Cont to monitor.    Malnutrition.  Adult Malnutrition type: Chronic illness.  Adult Degree of Malnutrition: Malnutrition of moderate degree. Malnutrition Characteristics: Weight loss, Inadequate energy, Fat loss.  Dietician following.  Pain and bowel regimen.  As per physiatry.  Patient started on Senokot S daily as needed and MiraLAX as needed 10/5/2024.  CVA.   Neurology recommended DAPT with Plavix/ASA x90 days total (~12/29/24) then ASA monotherapy.  Cont atorvastatin 40 mg daily.  Follow-up with neurology in 6 weeks.  Follow-up with cardiology for Zio patch and/or loop recorder on discharge.  Patient is receiving intensive PT OT with management as per physiatry.     Anticipated date of discharge:  10/17    The patient was discussed with Dr. Hendrickson and he is in agreement with the above note.

## 2024-10-10 NOTE — PROGRESS NOTES
"   10/10/24 0700   Pain Assessment   Pain Assessment Tool 0-10   Pain Score No Pain   Restrictions/Precautions   Precautions Bed/chair alarms;Fall Risk;Supervision on toilet/commode;Visual deficit  (R macular degeneration)   Weight Bearing Restrictions No   ROM Restrictions No   Cognition   Overall Cognitive Status WFL   Arousal/Participation Alert;Responsive;Cooperative   Attention Attends with cues to redirect   Orientation Level Oriented X4   Memory Within functional limits   Following Commands Follows one step commands without difficulty   Subjective   Subjective \"I am a little sore today\"   Roll Left and Right   Comment Pt OOB   Sit to Lying   Comment Pt OOB   Lying to Sitting on Side of Bed   Comment Pt OOB   Sit to Stand   Type of Assistance Needed Supervision   Physical Assistance Level No physical assistance   Comment With RW   Sit to Stand CARE Score 4   Bed-Chair Transfer   Type of Assistance Needed Supervision   Physical Assistance Level No physical assistance   Comment With RW   Chair/Bed-to-Chair Transfer CARE Score 4   Car Transfer   Reason if not Attempted Environmental limitations   Car Transfer CARE Score 10   Walk 10 Feet   Type of Assistance Needed Incidental touching;Verbal cues   Physical Assistance Level 25% or less   Comment CG/min A with RW on level and unlevel surfaces. Cues for locking R knee.   Walk 10 Feet CARE Score 3   Walk 50 Feet with Two Turns   Type of Assistance Needed Incidental touching;Verbal cues   Physical Assistance Level 25% or less   Comment CG/min A with RW on level and unlevel surfaces. Cues for locking R knee.   Walk 50 Feet with Two Turns CARE Score 3   Walk 150 Feet   Reason if not Attempted Safety concerns   Walk 150 Feet CARE Score 88   Walking 10 Feet on Uneven Surfaces   Type of Assistance Needed Incidental touching;Verbal cues   Physical Assistance Level 25% or less   Comment CG/min A with RW on level and unlevel surfaces. Cues for locking R knee.   Walking 10 " Feet on Uneven Surfaces CARE Score 3   Ambulation   Primary Mode of Locomotion Prior to Admission Walk   Distance Walked (feet) 124 ft  (124 and 115 ft)   Assist Device Roller Walker   Gait Pattern Inconsistant Marsha;Decreased foot clearance;Forward Flexion;Wide KADIE;Shuffle;Improper weight shift   Limitations Noted In Balance;Coordination;Device Management;Heel Strike;Endurance;Posture;Safety;Strength   Provided Assistance with: Balance;Trunk Support   Walk Assist Level Contact Guard   Findings CG/min A with RW on level and unlevel surfaces. Cues for locking R knee.   Does the patient walk? 2. Yes   Wheelchair mobility   Does the patient use a wheelchair? 1. Yes   Curb or Single Stair   Style negotiated Single stair   Type of Assistance Needed Incidental touching;Verbal cues   Physical Assistance Level 26%-50%   Comment CG/min A with memo HR and non-reciprocal pattern. Cues for sequence and technique   1 Step (Curb) CARE Score 3   4 Steps   Type of Assistance Needed Incidental touching;Verbal cues   Physical Assistance Level 26%-50%   Comment CG/min A with memo HR and non-reciprocal pattern. Cues for sequence and technique   4 Steps CARE Score 3   12 Steps   Reason if not Attempted Safety concerns   12 Steps CARE Score 88   Stairs   Type Stairs   # of Steps 7   Weight Bearing Precautions WBAT   Assist Devices Bilateral Rail   Findings CG/min A with memo HR and non-reciprocal pattern. Cues for sequence and technique to include maintaining knee extn on WBing limb   Picking Up Object   Reason if not Attempted Safety concerns   Picking Up Object CARE Score 88   Toilet Transfer   Type of Assistance Needed Supervision   Physical Assistance Level No physical assistance   Comment with RW and grab bars   Toilet Transfer CARE Score 4   Toilet Transfer   Surface Assessed Raised Toilet   Limitations Noted In Balance;Endurance;Safety;Sequencing;LE Strength   Adaptive Equipment Grab Bar   Therapeutic Interventions   Strengthening  Standing TE   Balance Gait and transfer training   Other Stair negotiation   Assessment   Treatment Assessment Pt agreeable to PT session this morning. No pain reported throughout session. Pt is supervision for transfers with RW. CG/min A with RW for ambulation on level and unlevel surfaces for max distance of 124 ft. Cues for locking R knee with ambulation. CG/Min A with memo HR and non-reciprocal pattern for stair negotiation. Required cues for sequence and technique. Patient fatigues very quickly with all activity. Standing TE for general LE strengthening. Gait and transfer training for increased balance, safety, and independence with funcitonal mobility. Pt appropriate for concurrent therapy to increase motivation and encouragement between patients with similar deficits while completing therapy. Pt returned to room to use bathroom, nursing assisted back to recliner when finished.   Problem List Decreased strength;Decreased range of motion;Decreased endurance;Impaired balance;Decreased coordination;Decreased safety awareness;Impaired vision   Barriers to Discharge Inaccessible home environment;Decreased caregiver support   PT Barriers   Physical Impairment Decreased strength;Decreased range of motion;Decreased endurance;Impaired balance;Decreased mobility;Decreased coordination;Decreased safety awareness;Impaired vision   Functional Limitation Car transfers;Ramp negotiation;Stair negotiation;Standing;Transfers;Walking;Wheelchair management   Plan   Treatment/Interventions Functional transfer training;LE strengthening/ROM;Elevations;Therapeutic exercise;Endurance training;Equipment eval/education;Bed mobility;Gait training   Progress Progressing toward goals   PT Therapy Minutes   PT Time In 0700   PT Time Out 0735   PT Total Time (minutes) 35   PT Mode of treatment - Individual (minutes) 0   PT Mode of treatment - Concurrent (minutes) 35   PT Mode of treatment - Group (minutes) 0   PT Mode of treatment - Co-treat  (minutes) 0   PT Mode of Treatment - Total time(minutes) 35 minutes   PT Cumulative Minutes 571   Therapy Time missed   Time missed? No

## 2024-10-10 NOTE — PROGRESS NOTES
Progress Note - PMR   Name: Gold Van 78 y.o. male I MRN: [id number]  Unit/Bed#: -01 I Date of Admission: 10/4/2024   Date of Service: 10/10/2024 I Hospital Day: 6     Assessment & Plan  CVA (cerebrovascular accident) (Formerly [facility])  MRI revealed multiple foci of acute infarcts in the bilateral frontoparietal cortices and subcortical white matter left > right  Placed on DAPT and statin for secondary stroke prophylaxis per neurology and follow up with Neuro in 6 weeks (DAPT for 6 months)  Etiology thought to be coagulopathy secondary to COVID infection  CT CAP completed to rule out malignancy  TTE showing 50% EF but posterior wall hypokinesis and will need cardiology follow up  Patient endorses improvement in right sided weakness. Has ongoing dysmetria and incoordination in the right nahomi body  Physical, Occupational, and Speech therapy      Type 2 diabetes mellitus with complication, without long-term current use of insulin ([facility])  Lab Results   Component Value Date    HGBA1C 10.6 (H) 10/02/2024       Recent Labs     10/09/24  1134 10/09/24  1609 10/09/24  1948 10/10/24  0701   POCGLU 327* 257* 237* 138     Uncontrolled type 2 diabetes with A1C most recently of 10.6  Restarting home glipizide per IM and stopping Lantus  Wife tbrought in Home metformin XL 750mg pills  SSI and QID glucose checks  CCD    Essential hypertension  Atenolol 25 mg daily as well as amlodipine 5 mg twice daily and lisinopril 20 mg twice daily  Management per internal medicine and monitor blood pressures and therapy and on routine vitals  Hyperlipidemia  Continue statin  Sepsis due to pneumonia ([facility])  Met sepsis criteria on admission with tahcycardia, leukocytosis. Lactic acidosis. Also with acute repsiratory failure requiring CPAP-> BiPAP and eventually nasal canula  Blood cultures felt to be contaminants  CXR with left base opacity atelectasis vs pneumonia  S/P IV Ceftriaxone and azithromycin for 4 days  Sputum C&S was positive for  Pseudomonas-received cefepime/Azithromycin while inpatient, transitioning to Vantin for total of 2 more days (5 days course of treatment as recommended by pulmonology)   Pulm followed in acute care  IV steroids weaned and now on prior chronic prednisone regimen  Hyponatremia  Patient presents with hyponatremia sodium of 134 as of 10/3  Continue to monitor on biweekly labs or sooner if clinically indicated  Pruritus  Multiple years history of generalized pruritus with extensive work up in past  On alternating prednisone doses  Monitor for any changes  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HCC)  Suspected COPD exacerbation in setting of COVID infection  Initially requiring CPAP, transitioned to BiPAP, successfully weaned to room air  Appreciate input of pulmonology who followed  Received IV Solu-Medrol, transitioned to oral prednisone on 10/2  Continue robust regimen of meds for baseline COPD    COVID-19  Presented with shortness of breath and was in the hospital 9/24/24 for acute respiratory symptoms related to infection  Received remdesivir and baricitinib  Felt that strokes related COVID coagulopathy  Abnormal echocardiogram  Had TTE with posterior wall abnormalities/mild hypokinesis noted. EF 50%  Follow up with cardiology as an outpatient, would benefit from monitor and stress test  Gastroesophageal reflux disease without esophagitis  Continue pepcid  Impaired mobility and activities of daily living  Patient was evaluated by the rehabilitation team MD and an appropriate candidate for acute inpatient rehabilitation program at this time.  The patient will tolerate 3 hours/day 5 to 7 days/week of intensive physical, occupational and speech therapy in order to obtain goals for community discharge  Due to the patient's functional Compared to their baseline level of function in addition to their ongoing medical needs, the patient would benefit from daily supervision from a rehabilitation physician as well as rehabilitation nursing to  implement and adjust the medical as well as functional plan of care in order to meet the patient's goals.    Neuropathy  Neuropathy in feet and lower 1/3 of the tibia bilaterally  Undiagnosed previously, but to consider in therapies  Likely related to diabetes  Consider gabapentin, however not uncomfortable at this time  Foot drop, left  Patient states he has had issues with the foot for some time now  Presents with 1/5 ankle dorsiflexor strength on the left  Eval for bracing  Moderate protein-calorie malnutrition (HCC)  Malnutrition Findings:   Adult Malnutrition type: Chronic illness  Adult Degree of Malnutrition: Malnutrition of moderate degree  Malnutrition Characteristics: Weight loss, Inadequate energy, Fat loss                  360 Statement: Malnutrition related to inadequate energy intake as evidenced by 15#(9%) weight loss from 8/7/# to 10/4/#, subcutaneous fat loss orbital/cheek region and extremities, <75% energy intake compared to estimated needs >1 month.    BMI Findings:           Body mass index is 21.11 kg/m².       Subjective   Gold Van is a 78 y.o. male with history of hypertension, type 2 diabetes, COPD, chronic steroid use who presented to the [facility] initially on 9/29/2024 for shortness of breath. Patient with a history of COPD and asthma and recent COVID infection with hospitalization. He arrived via ambulance with a CPAP on and then required a transition to BiPAP in the ER. He was initiated on IV cefepime for suspected pneumonia and also IV steroids. He met sepsis criteria for the tachycardia and leukocytosis in the setting of dental pneumonia. He had some difficulty with coordination of his right leg during a PT session as of 10/1 and neurology evaluated the patient and recommended stroke. An MRI of the brain did note multiple acute infarcts in the bilateral frontoparietal cortices and subcortical white matter changes without evidence of mass  effect or hemorrhagic transformation. Patient was started on dual antiplatelet therapy and statin and additionally had a CT of the chest abdomen pelvis to rule out other causes of hypercoagulability, stroke, malignancy. Hypercoagulability it was thought to be related to his prior COVID infection. He was on telemetry without any issues and no atrial fibrillation noted. He did have a TTE that noted some wall motion abnormality in the posterior sections. Plan for outpatient monitor and stress test. Pulmonology also following the patient and he continued with cefepime and azithromycin and is discharging on 2 additional days for coverage of Pseudomonas. This was grown in the sputum and was recommended by pulmonology. The patient was evaluated by the Rehabilitation team and deemed an appropriate candidate for comprehensive inpatient rehabilitation and admitted to the [facility] on 10/4/2024 1:24 PM     Chief Complaint: f/u stroke    Interval: Patient seen and evaluated in room.  He feels quite tired at this time but had morning therapy and plans for an additional session in these early afternoon/early morning.  New skin tear noted on the left anterior shin is bleeding.  Discussed with nursing to cover.  He thinks he had bumped it earlier in therapy. Patient denies fever, chills, nausea, emesis, cough, shortness of breath, diarrhea, or constipation. Overall breathing seems to be good today with no tightness, sleep was fine, mood stable.  No significant discomfort or pain at this time      Objective :  Temp:  [97.5 °F (36.4 °C)-98.1 °F (36.7 °C)] 98.1 °F (36.7 °C)  HR:  [53-76] 53  BP: (119-123)/(56-57) 119/57  Resp:  [16] 16  SpO2:  [94 %-95 %] 94 %  O2 Device: None (Room air)    Functional Update:  Physical Therapy Occupational Therapy Speech Therapy   Weight Bearing Status: Weight Bearing as Tolerated  Transfers: Incidental Touching, Supervision  Bed Mobility: Supervision, Independent  Amulation Distance (ft): 190  feet  Ambulation: Minimal Assistance  Assistive Device for Ambulation: Single Point Cane  Wheelchair Mobility Distance:  (N/A)  Wheelchair Mobility:  (N/A)  Number of Stairs: 14  Assistive Device for Stairs: Right Hand Rail (single HR + SPC)  Stair Assistance: Minimal Assistance (CGA/Virgie)  Ramp: Moderate Assistance  Assistive Device for Ramp: Roller Walker  Discharge Recommendations: Home with:  DC Home with:: Family Support, Outpatient Physical Therapy   Eating: Independent  Grooming: Supervision  Bathing: Minimal Assistance  Bathing: Minimal Assistance  Upper Body Dressing: Supervision  Lower Body Dressing: Minimal Assistance  Toileting: Incidental Touching  Tub/Shower Transfer: Minimal Assistance  Toilet Transfer: Incidental Touching  Cognition: Within Defined Limits  Orientation: Person, Place, Time, Situation               Physical Exam  Vitals reviewed.   Constitutional:       General: He is not in acute distress.  HENT:      Head: Normocephalic and atraumatic.      Right Ear: External ear normal.      Left Ear: External ear normal.      Nose: Nose normal. No rhinorrhea.      Mouth/Throat:      Mouth: Mucous membranes are moist.      Pharynx: Oropharynx is clear.   Eyes:      General: No scleral icterus.  Cardiovascular:      Rate and Rhythm: Normal rate.      Pulses: Normal pulses.   Pulmonary:      Effort: Pulmonary effort is normal. No respiratory distress.   Abdominal:      General: There is no distension.      Palpations: Abdomen is soft.   Musculoskeletal:      Right lower leg: Edema present.      Left lower leg: Edema present.   Skin:     General: Skin is warm and dry.   Neurological:      Mental Status: He is alert and oriented to person, place, and time.      Comments: Dysmetria in the right hemibody with left-sided foot drop and sensory deficits in the lower extremities bilaterally   Psychiatric:         Mood and Affect: Mood normal.         Behavior: Behavior normal.             Scheduled  Meds:  Current Facility-Administered Medications   Medication Dose Route Frequency Provider Last Rate    acetaminophen  650 mg Oral Q6H PRN Annie L Chelseynall, PA-C      albuterol  2 puff Inhalation Q4H PRN Annie L Chelseynall, PA-C      albuterol  2.5 mg Nebulization Q4H PRN Annie L Chelseynall, PA-C      amLODIPine  5 mg Oral BID Annie L Chelseynall, PA-C      aspirin  81 mg Oral Daily Annie L Chelseynall, PA-C      atenolol  25 mg Oral Daily Annie L Chelseynall, PA-C      atorvastatin  40 mg Oral QPM Annie L Chelseynall, PA-C      benzonatate  100 mg Oral TID PRN Annie L Chelseynall, PA-C      Budeson-Glycopyrrol-Formoterol  2 puff Inhalation Q12H Annie L Chelseynall, PA-C      clopidogrel  75 mg Oral Daily Annie L Chelseynall, PA-C      enoxaparin  40 mg Subcutaneous Daily Anniesamra Barbosanall, PA-C      famotidine  20 mg Oral BID Annie STEPHANIE Barbosanall, PA-C      glipiZIDE  2.5 mg Oral BID With Meals Anniesamra Izquierdol, PA-C      guaiFENesin  600 mg Oral BID Anniesamra Izquierdol, PA-C      insulin lispro  1-6 Units Subcutaneous HS Anniesamra Izquierdol, PA-C      insulin lispro  2-12 Units Subcutaneous TID AC Anniesamra Izquierdol, PA-C      levalbuterol  1.25 mg Nebulization TID Anniesamra Izquierdol, PA-C      lisinopril  20 mg Oral BID Anniesamra Izquierdol, PA-C      LORazepam  0.5 mg Oral HS PRN Anniesamra Barbosanall, PA-C      metFORMIN  750 mg Oral Daily With Dinner Christian Hendrickson MD      montelukast  10 mg Oral HS Annie STEPHANIE Barbosanall, PA-C      polyethylene glycol  17 g Oral Daily PRN Christian Hendrickson MD      predniSONE  15 mg Oral Every Other Day Annie L Chelseynall, PA-C      predniSONE  5 mg Oral Every Other Day Annie L Chelseynall, PA-C      senna-docusate sodium  1 tablet Oral HS PRN Christian Hendrickson MD      sodium chloride  4 mL Nebulization TID Anniesamra Izquierdol, PA-C           Lab Results: I have reviewed the following results:  Results from last 7 days   Lab Units 10/05/24  0533   HEMOGLOBIN g/dL 11.7*   HEMATOCRIT % 35.2*   WBC Thousand/uL 8.78   PLATELETS  Thousands/uL 252     Results from last 7 days   Lab Units 10/05/24  0533   BUN mg/dL 23   SODIUM mmol/L 133*   POTASSIUM mmol/L 3.9   CHLORIDE mmol/L 103   CREATININE mg/dL 0.83   AST U/L 13   ALT U/L 21                Brandon Munoz, DO  Physical Medicine and Rehabilitation  [facility]    I have spent a total time of 35 minutes in caring for this patient on the day of the visit/encounter including Counseling / Coordination of care, Documenting in the medical record, and Communicating with other healthcare professionals .

## 2024-10-10 NOTE — PROGRESS NOTES
10/10/24 1204   Pain Assessment   Pain Assessment Tool 0-10   Pain Score No Pain   Restrictions/Precautions   Precautions Bed/chair alarms;Fall Risk;Visual deficit;Supervision on toilet/commode  (R macular degeneration)   Eating   Type of Assistance Needed Independent   Physical Assistance Level No physical assistance   Comment Pt Indep s/u lunch tray, encouraged pt to sit unsupported for mgmt of meal to facilitate core strength;Pt managed utensils within B hands with no deficits   Eating CARE Score 6   Sit to Stand   Type of Assistance Needed Supervision;Verbal cues   Physical Assistance Level No physical assistance   Sit to Stand CARE Score 4   Toileting Hygiene   Type of Assistance Needed Supervision;Verbal cues   Toileting Hygiene CARE Score 4   Toileting   Able to Pull Clothing down yes, up yes.   Manage Hygiene Bladder   Limitations Noted In Balance;Coordination;LE Strength;Safety   Adaptive Equipment Grab Bar   Toilet Transfer   Type of Assistance Needed Supervision;Verbal cues;Adaptive equipment   Physical Assistance Level No physical assistance   Comment RW   Toilet Transfer CARE Score 4   Toilet Transfer   Surface Assessed Raised Toilet   Transfer Technique Standard   Limitations Noted In Balance;LE Strength;Safety;Endurance   Adaptive Equipment Grab Bar;Walker   Exercise Tools   Theraband red t band: R shld flex/ext no resistance L shld with red t band 3x10-bilateral stretch 3x10, unilateral stretch 3x10 and forward punch 3x10   Cognition   Overall Cognitive Status WFL   Arousal/Participation Alert;Cooperative   Attention Attends with cues to redirect   Orientation Level Oriented X4   Memory Within functional limits   Following Commands Follows one step commands without difficulty   Activity Tolerance   Activity Tolerance Patient tolerated treatment well   Assessment   Treatment Assessment OT session addressed safety with functional txfs and mobility with RW, safety with all aspects of toileting, meal  mgmt and B UE TE. Refer to respective sections of note for details of session. During fxnl mobility with RW while exiting BR he had a LOB and needed min A to regain, legs scissored. Pt provided with lengthy rest periods due to fatigue with exertion of TE. No pain reported during session. Pt reports tasks are taxing for him. Plan is to continue with skilled OT to further progress pt toward OT goals.   Prognosis Good   Problem List Decreased strength;Decreased coordination;Decreased mobility;Impaired balance;Decreased endurance;Impaired judgement;Decreased safety awareness;Impaired vision   Plan   Treatment/Interventions ADL retraining;Patient/family training;Compensatory technique education;Spoke to nursing;OT   Progress Progressing toward goals   OT Therapy Minutes   OT Time In 1204   OT Time Out 1309   OT Total Time (minutes) 65   OT Mode of treatment - Individual (minutes) 65   OT Mode of treatment - Concurrent (minutes) 0   OT Mode of treatment - Group (minutes) 0   OT Mode of treatment - Co-treat (minutes) 0   OT Mode of Treatment - Total time(minutes) 65 minutes   OT Cumulative Minutes 170   Therapy Time missed   Time missed? No

## 2024-10-10 NOTE — PROGRESS NOTES
"   10/10/24 1030   Pain Assessment   Pain Assessment Tool 0-10   Pain Score No Pain   Restrictions/Precautions   Precautions Bed/chair alarms;Fall Risk;Supervision on toilet/commode;Visual deficit  (R macular degeneration)   Weight Bearing Restrictions No   ROM Restrictions No   Cognition   Overall Cognitive Status WFL   Arousal/Participation Alert;Responsive;Cooperative   Attention Attends with cues to redirect   Orientation Level Oriented X4   Memory Within functional limits   Following Commands Follows one step commands without difficulty   Subjective   Subjective \"I feel tired\"   Roll Left and Right   Comment Pt OOB   Sit to Lying   Comment Pt OOB   Lying to Sitting on Side of Bed   Comment Pt OOB   Sit to Stand   Type of Assistance Needed Incidental touching   Physical Assistance Level 25% or less   Comment Supervision to min A/CGA with RW pending surface height   Sit to Stand CARE Score 3   Bed-Chair Transfer   Type of Assistance Needed Incidental touching   Physical Assistance Level 25% or less   Comment supervision to min A/CGA with RW   Chair/Bed-to-Chair Transfer CARE Score 3   Car Transfer   Reason if not Attempted Environmental limitations   Car Transfer CARE Score 10   Walk 10 Feet   Type of Assistance Needed Incidental touching   Physical Assistance Level 25% or less   Comment CG/min A x 1 with RW on level and unlevel surfaces. Cues for locking R knee.   Walk 10 Feet CARE Score 3   Walk 50 Feet with Two Turns   Type of Assistance Needed Incidental touching   Physical Assistance Level 25% or less   Comment CG/min A x 1 with RW on level and unlevel surfaces. Cues for locking R knee.   Walk 50 Feet with Two Turns CARE Score 3   Walk 150 Feet   Reason if not Attempted Safety concerns   Walk 150 Feet CARE Score 88   Walking 10 Feet on Uneven Surfaces   Type of Assistance Needed Incidental touching   Physical Assistance Level 25% or less   Comment CG/min A x 1 with RW on level and unlevel surfaces. Cues for " locking R knee.   Walking 10 Feet on Uneven Surfaces CARE Score 3   Ambulation   Primary Mode of Locomotion Prior to Admission Walk   Distance Walked (feet) 135 ft  (135 x 2 to and from room)   Assist Device Roller Walker   Gait Pattern Inconsistant Marsha;Decreased foot clearance;Forward Flexion;Wide KADIE;Shuffle;Improper weight shift   Limitations Noted In Balance;Coordination;Device Management;Heel Strike;Endurance;Posture;Safety;Strength   Provided Assistance with: Balance;Trunk Support   Walk Assist Level Minimum Assist;Contact Guard   Findings CG/min A x 1 with RW on level and unlevel surfaces. Cues for locking R knee.   Does the patient walk? 2. Yes   Wheel 50 Feet with Two Turns   Type of Assistance Needed Independent   Physical Assistance Level No physical assistance   Comment Mod I for level and unlevel surfaces, LE fatigue noted   Wheel 50 Feet with Two Turns CARE Score 6   Wheelchair mobility   Does the patient use a wheelchair? 1. Yes   Type of Wheelchair Used 1. Manual   Method Right upper extremity;Left upper extremity;Right lower extremity;Left lower extremity   Distance Level Surface (feet) 130 ft   Distance Wheeled 3% Grade 12 ft   Findings Mod I for level and unlevel surfaces, LE fatigue noted   Curb or Single Stair   Comment Assessed in earlier session   Picking Up Object   Reason if not Attempted Safety concerns   Picking Up Object CARE Score 88   Toilet Transfer   Comment not needed during session   Therapeutic Interventions   Strengthening Seated TE   Balance Gait and transfer training   Neuromuscular Re-Education Static standing on blue foam with divided attention   Other DF assist wrapping, WC mobility   Equipment Use   NuStep 10 min, level 1   Assessment   Treatment Assessment Pt agreeable to PT session this morning. No reports of pain throughout session. Supervision to Min A x 1 for transfers with RW pending surface height. CG/min A x 1 with RW for ambulation on level and unlevel surfaces  up to 135 ft. Trialed L DF assist wrap to help with foot clearance. Pt reported not noticing a difference, however PT observed difference. Mod I for WC mobility, reports LE fatigue requiring increased time to complete. Seated TE for general LE strengthening. Gait and transfer training for increased balance, safety, and independence with funcitonal mobility. NMR with static standing on foam while perfoming divided attention task. No LOB noted with NMR. Pt agreeable to continue trial of DF assist wrap on L LE and trial R wrap next session. Pt continued to fatigue quickly with all tasks. Pt returned to room in recliner with alarms on and all needs within reach.   Problem List Decreased strength;Decreased range of motion;Decreased endurance;Impaired balance;Decreased coordination;Decreased safety awareness;Impaired vision   Barriers to Discharge Inaccessible home environment;Decreased caregiver support   PT Barriers   Physical Impairment Decreased strength;Decreased range of motion;Decreased endurance;Impaired balance;Decreased mobility;Decreased coordination;Decreased safety awareness;Impaired vision   Functional Limitation Car transfers;Ramp negotiation;Stair negotiation;Standing;Transfers;Walking;Wheelchair management   Plan   Treatment/Interventions Functional transfer training;LE strengthening/ROM;Elevations;Therapeutic exercise;Endurance training;Equipment eval/education;Bed mobility;Gait training   Progress Progressing toward goals   PT Therapy Minutes   PT Time In 1030   PT Time Out 1200   PT Total Time (minutes) 90   PT Mode of treatment - Individual (minutes) 90   PT Mode of treatment - Concurrent (minutes) 0   PT Mode of treatment - Group (minutes) 0   PT Mode of treatment - Co-treat (minutes) 0   PT Mode of Treatment - Total time(minutes) 90 minutes   PT Cumulative Minutes 661   Therapy Time missed   Time missed? No

## 2024-10-10 NOTE — PROGRESS NOTES
10/10/24 1400   Pain Assessment   Pain Assessment Tool 0-10   Pain Score No Pain   Restrictions/Precautions   Precautions Bed/chair alarms;Fall Risk;Supervision on toilet/commode;Visual deficit  (macular degeneration and cataracts)   Cognitive Linguisitic Assessments   Cognitive Linquistic Quick Test (CLQT) RBANS adminsitered 10/9/240 reviewed results with patient 10/10/24   Comprehension   Assist Devices Glasses   Comprehension (FIM) 6 - Has only MILD difficulty with complex/abstract info   Expression   Expression (FIM) 7 - Expresses complex/abstract ideas in a reasonable time w/o devices or helper.   Social Interaction   Social Interaction (FIM) 7 - Interacts appropriately without assistive device, medication or helper   Problem Solving   Problem solving (FIM) 6 - Solves complex problems BUT requires extra time   Memory   Memory (FIM) 6 - Recognizes with extra time   Memory Skills   Orientation Level Oriented X4   Speech/Language/Cognition Assessmetn   Treatment Assessment Discussed POC with PT who has also observed some difficulty with attention and overall endurance. Education provided to patient regarding attention as well as different levels of attention. He verbalized his understanding. Alternated sorting cards across 3 different categories color/shape/number with a deck of cards with max cue initially decreased to occasional min cue across 10:00 to complete task. Background noise present to formulate words provided 6 different letters which he was able to formulate 15 words across 5:00. Background noise present for scattergories provided list 1 and letter /s/ able to complete 7/10 categories jere increased to 10/10 mod cue. No word finding deficits across conversation but working towards overall improved processing time to complete tasks.   SLP Therapy Minutes   SLP Time In 1310   SLP Time Out 1410   SLP Total Time (minutes) 60   SLP Mode of treatment - Individual (minutes) 60   SLP Mode of treatment -  Concurrent (minutes) 0   SLP Mode of treatment - Group (minutes) 0   SLP Mode of treatment - Co-treat (minutes) 0   SLP Mode of Treatment - Total time(minutes) 60 minutes   SLP Cumulative Minutes 114

## 2024-10-11 LAB
GLUCOSE SERPL-MCNC: 170 MG/DL (ref 65–140)
GLUCOSE SERPL-MCNC: 223 MG/DL (ref 65–140)
GLUCOSE SERPL-MCNC: 288 MG/DL (ref 65–140)
GLUCOSE SERPL-MCNC: 98 MG/DL (ref 65–140)

## 2024-10-11 PROCEDURE — 97116 GAIT TRAINING THERAPY: CPT

## 2024-10-11 PROCEDURE — 97112 NEUROMUSCULAR REEDUCATION: CPT

## 2024-10-11 PROCEDURE — 97110 THERAPEUTIC EXERCISES: CPT

## 2024-10-11 PROCEDURE — 92507 TX SP LANG VOICE COMM INDIV: CPT | Performed by: NURSE PRACTITIONER

## 2024-10-11 PROCEDURE — 97530 THERAPEUTIC ACTIVITIES: CPT

## 2024-10-11 PROCEDURE — 99232 SBSQ HOSP IP/OBS MODERATE 35: CPT | Performed by: STUDENT IN AN ORGANIZED HEALTH CARE EDUCATION/TRAINING PROGRAM

## 2024-10-11 PROCEDURE — 82948 REAGENT STRIP/BLOOD GLUCOSE: CPT

## 2024-10-11 PROCEDURE — 97535 SELF CARE MNGMENT TRAINING: CPT

## 2024-10-11 RX ADMIN — METFORMIN HYDROCHLORIDE 750 MG: 750 TABLET, EXTENDED RELEASE ORAL at 16:45

## 2024-10-11 RX ADMIN — LEVALBUTEROL HYDROCHLORIDE 1.25 MG: 1.25 SOLUTION RESPIRATORY (INHALATION) at 14:55

## 2024-10-11 RX ADMIN — BUDESONIDE, GLYCOPYRROLATE, AND FORMOTEROL FUMARATE 2 PUFF: 160; 9; 4.8 AEROSOL, METERED RESPIRATORY (INHALATION) at 19:20

## 2024-10-11 RX ADMIN — LISINOPRIL 20 MG: 20 TABLET ORAL at 21:17

## 2024-10-11 RX ADMIN — ATENOLOL 25 MG: 25 TABLET ORAL at 08:47

## 2024-10-11 RX ADMIN — GUAIFENESIN 600 MG: 600 TABLET ORAL at 17:15

## 2024-10-11 RX ADMIN — PREDNISONE 5 MG: 5 TABLET ORAL at 08:47

## 2024-10-11 RX ADMIN — FAMOTIDINE 20 MG: 20 TABLET, FILM COATED ORAL at 17:15

## 2024-10-11 RX ADMIN — LEVALBUTEROL HYDROCHLORIDE 1.25 MG: 1.25 SOLUTION RESPIRATORY (INHALATION) at 07:51

## 2024-10-11 RX ADMIN — GLIPIZIDE 2.5 MG: 2.5 TABLET, EXTENDED RELEASE ORAL at 07:50

## 2024-10-11 RX ADMIN — LISINOPRIL 20 MG: 20 TABLET ORAL at 08:47

## 2024-10-11 RX ADMIN — MONTELUKAST 10 MG: 10 TABLET, FILM COATED ORAL at 21:17

## 2024-10-11 RX ADMIN — ENOXAPARIN SODIUM 40 MG: 40 INJECTION SUBCUTANEOUS at 08:47

## 2024-10-11 RX ADMIN — INSULIN LISPRO 2 UNITS: 100 INJECTION, SOLUTION INTRAVENOUS; SUBCUTANEOUS at 21:18

## 2024-10-11 RX ADMIN — AMLODIPINE BESYLATE 5 MG: 5 TABLET ORAL at 08:48

## 2024-10-11 RX ADMIN — LEVALBUTEROL HYDROCHLORIDE 1.25 MG: 1.25 SOLUTION RESPIRATORY (INHALATION) at 20:13

## 2024-10-11 RX ADMIN — GLIPIZIDE 2.5 MG: 2.5 TABLET, EXTENDED RELEASE ORAL at 16:45

## 2024-10-11 RX ADMIN — INSULIN LISPRO 6 UNITS: 100 INJECTION, SOLUTION INTRAVENOUS; SUBCUTANEOUS at 12:25

## 2024-10-11 RX ADMIN — ATORVASTATIN CALCIUM 40 MG: 40 TABLET, FILM COATED ORAL at 17:15

## 2024-10-11 RX ADMIN — INSULIN LISPRO 2 UNITS: 100 INJECTION, SOLUTION INTRAVENOUS; SUBCUTANEOUS at 16:46

## 2024-10-11 RX ADMIN — SODIUM CHLORIDE SOLN NEBU 3% 4 ML: 3 NEBU SOLN at 14:55

## 2024-10-11 RX ADMIN — AMLODIPINE BESYLATE 5 MG: 5 TABLET ORAL at 21:17

## 2024-10-11 RX ADMIN — SODIUM CHLORIDE SOLN NEBU 3% 4 ML: 3 NEBU SOLN at 20:13

## 2024-10-11 RX ADMIN — BUDESONIDE, GLYCOPYRROLATE, AND FORMOTEROL FUMARATE 2 PUFF: 160; 9; 4.8 AEROSOL, METERED RESPIRATORY (INHALATION) at 07:50

## 2024-10-11 RX ADMIN — ASPIRIN 81 MG 81 MG: 81 TABLET ORAL at 08:48

## 2024-10-11 RX ADMIN — SODIUM CHLORIDE SOLN NEBU 3% 4 ML: 3 NEBU SOLN at 07:51

## 2024-10-11 RX ADMIN — CLOPIDOGREL 75 MG: 75 TABLET ORAL at 08:48

## 2024-10-11 RX ADMIN — GUAIFENESIN 600 MG: 600 TABLET ORAL at 08:48

## 2024-10-11 RX ADMIN — FAMOTIDINE 20 MG: 20 TABLET, FILM COATED ORAL at 08:47

## 2024-10-11 NOTE — CASE MANAGEMENT
Cm received voice mail message from Stacia at [employer], approving additional days , update due 10/14.

## 2024-10-11 NOTE — PROGRESS NOTES
10/11/24 1130   Pain Assessment   Pain Assessment Tool 0-10   Pain Score No Pain   Restrictions/Precautions   Precautions Bed/chair alarms;Fall Risk;Supervision on toilet/commode;Visual deficit   Bed-Chair Transfer   Type of Assistance Needed Incidental touching  (CG)   Comment RW   Chair/Bed-to-Chair Transfer CARE Score 4   Exercise Tools   Other Exercise Tool 1 sanderbox 30x BUE in all directions   Other Exercise Tool 2 yellow flexbar 30x BUE supination/pronation and internal/ext rotation.   Other Exercise Tool 3 green digiflex 30x B hands.   Cognition   Overall Cognitive Status WFL   Arousal/Participation Alert;Responsive;Cooperative   Attention Attends with cues to redirect   Orientation Level Oriented X4   Memory Within functional limits   Following Commands Follows one step commands without difficulty   Additional Activities   Additional Activities Comments fxl mobility with w/c from OT room to pt room.   Assessment   Treatment Assessment Pt agreeable to brief OT session this AM and recieved directly from PT in the PT gym. pt completed fxl mobility/transfers and therex this session. Refer to respective sections of note for details of session. Pt barriers include decreased strength/endurance/ coordination impaired balance, decreased safety awareness/judgement and impaired vision. Recommended continued skilled OT services to increased independence with daily tasks.   Problem List Decreased strength;Decreased endurance;Impaired balance;Decreased coordination;Impaired judgement;Decreased safety awareness;Impaired vision   Plan   Treatment/Interventions ADL retraining;Functional transfer training;LE strengthening/ROM;Therapeutic exercise;Endurance training;Patient/family training;Equipment eval/education;Compensatory technique education;OT   Progress Progressing toward goals   OT Therapy Minutes   OT Time In 1130   OT Time Out 1159   OT Total Time (minutes) 29   OT Mode of treatment - Individual (minutes) 29    Therapy Time missed   Time missed? No

## 2024-10-11 NOTE — NUTRITION
10/11/24 1428   Biochemical Data,Medical Tests, and Procedures   Biochemical Data/Medical Tests/Procedures Lab values reviewed;Meds reviewed   Labs (Comment) BG elevated at times   Meds (Comment) norvasc, ASA, lipitor, plavix, glipizide pepcid, humalog, zestril, ativan, metformin, prednisone   Nutrition-Focused Physical Exam   Nutrition-Focused Physical Exam Findings RN skin assessment reviewed  (Wound right pretibila, pressure injury coccyx per documentation)   Medical-Related Concerns PMH reviewed   Adequacy of Intake   Nutrition Modality PO   Feeding Route   PO Independent   Current PO Intake   Current Diet Order CCD 2, 4gm Na diet thin liquids   Nutrition Supplements Glucerna  (once daily)   Current Meal Intake 50-75%;%   Intake Supplements 50-75%;%   Estimated calorie intake compared to estimated need Nutrient needs met.   PES Statement   Problem Continue previous diagnosis   PES Statement 2   Problem Continue previous diagnosis   Recommendations/Interventions   Malnutrition/BMI Present Yes  (as previously noted)   Summary CCD 2, 4gm Na diet thin liquids. Vanilla glucerna at lunch. Meal completions %. No new weight. Medications reviewed. Missing teeth. +1 BLE edema. Skin integrity reviewed. Reports good appetite.   Interventions/Recommendations Continue current diet order   Education Assessment   Education Education not indicated at this time   Patient Nutrition Goals   Goal Avoid weight loss;Glucose WNL;Meet PO needs   Goal Status Met;Extended   Timeframe to complete goal by next f/u   Nutrition Complexity Risk   Nutrition complexity level Low risk   Follow up date 10/18/24

## 2024-10-11 NOTE — NURSING NOTE
Tolerating therapy.  Lungs clear, decreased right lung.  Occ non productive cough, In no distress.  Resp tx's, meds, inhalers as orders..  Tolerated therapy.  See nursing assessment.   +1 edema BLE.  Continue same plan of care.  Safety maintained at all times.

## 2024-10-11 NOTE — ASSESSMENT & PLAN NOTE
Lab Results   Component Value Date    HGBA1C 10.6 (H) 10/02/2024       Recent Labs     10/10/24  1149 10/10/24  1633 10/10/24  2019 10/11/24  0718   POCGLU 344* 316* 282* 98     Uncontrolled type 2 diabetes with A1C most recently of 10.6  Restarting home glipizide per IM and stopping Lantus  Wife tbrought in Home metformin XL 750mg pills  SSI and QID glucose checks  CCD

## 2024-10-11 NOTE — PROGRESS NOTES
Progress Note - PMR   Name: Gold Van 78 y.o. male I MRN: [id number]  Unit/Bed#: -01 I Date of Admission: 10/4/2024   Date of Service: 10/11/2024 I Hospital Day: 7     Assessment & Plan  CVA (cerebrovascular accident) ([facility])  MRI revealed multiple foci of acute infarcts in the bilateral frontoparietal cortices and subcortical white matter left > right  Placed on DAPT and statin for secondary stroke prophylaxis per neurology and follow up with Neuro in 6 weeks (DAPT for 3 months)  Etiology thought to be coagulopathy secondary to COVID infection  CT CAP completed to rule out malignancy  TTE showing 50% EF but posterior wall hypokinesis and will need cardiology follow up  Patient endorses improvement in right sided weakness. Has ongoing dysmetria and incoordination in the right nahomi body  Physical, Occupational, and Speech therapy      Type 2 diabetes mellitus with complication, without long-term current use of insulin ([facility])  Lab Results   Component Value Date    HGBA1C 10.6 (H) 10/02/2024       Recent Labs     10/10/24  1149 10/10/24  1633 10/10/24  2019 10/11/24  0718   POCGLU 344* 316* 282* 98     Uncontrolled type 2 diabetes with A1C most recently of 10.6  Restarting home glipizide per IM and stopping Lantus  Wife tbrought in Home metformin XL 750mg pills  SSI and QID glucose checks  CCD    Essential hypertension  Atenolol 25 mg daily as well as amlodipine 5 mg twice daily and lisinopril 20 mg twice daily  Management per internal medicine and monitor blood pressures and therapy and on routine vitals  Hyperlipidemia  Continue statin  Sepsis due to pneumonia ([facility])  Met sepsis criteria on admission with tahcycardia, leukocytosis. Lactic acidosis. Also with acute repsiratory failure requiring CPAP-> BiPAP and eventually nasal canula  Blood cultures felt to be contaminants  CXR with left base opacity atelectasis vs pneumonia  S/P IV Ceftriaxone and azithromycin for 4 days  Sputum C&S was positive for  Pseudomonas-received cefepime/Azithromycin while inpatient, transitioning to Vantin for total of 2 more days (5 days course of treatment as recommended by pulmonology)   Pulm followed in acute care  IV steroids weaned and now on prior chronic prednisone regimen  Hyponatremia  Patient presents with hyponatremia sodium of 134 as of 10/3  Continue to monitor on biweekly labs or sooner if clinically indicated  Pruritus  Multiple years history of generalized pruritus with extensive work up in past  On alternating prednisone doses  Monitor for any changes  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HCC)  Suspected COPD exacerbation in setting of COVID infection  Initially requiring CPAP, transitioned to BiPAP, successfully weaned to room air  Appreciate input of pulmonology who followed  Received IV Solu-Medrol, transitioned to oral prednisone on 10/2  Continue robust regimen of meds for baseline COPD    COVID-19  Presented with shortness of breath and was in the hospital 9/24/24 for acute respiratory symptoms related to infection  Received remdesivir and baricitinib  Felt that strokes related COVID coagulopathy  Abnormal echocardiogram  Had TTE with posterior wall abnormalities/mild hypokinesis noted. EF 50%  Follow up with cardiology as an outpatient, would benefit from monitor and stress test  Gastroesophageal reflux disease without esophagitis  Continue pepcid  Impaired mobility and activities of daily living  Patient was evaluated by the rehabilitation team MD and an appropriate candidate for acute inpatient rehabilitation program at this time.  The patient will tolerate 3 hours/day 5 to 7 days/week of intensive physical, occupational and speech therapy in order to obtain goals for community discharge  Due to the patient's functional Compared to their baseline level of function in addition to their ongoing medical needs, the patient would benefit from daily supervision from a rehabilitation physician as well as rehabilitation nursing to  implement and adjust the medical as well as functional plan of care in order to meet the patient's goals.    Neuropathy  Neuropathy in feet and lower 1/3 of the tibia bilaterally  Undiagnosed previously, but to consider in therapies  Likely related to diabetes  Consider gabapentin, however not uncomfortable at this time  Foot drop, left  Patient states he has had issues with the foot for some time now  Presents with 1/5 ankle dorsiflexor strength on the left  Eval for bracing  Moderate protein-calorie malnutrition (HCC)  Malnutrition Findings:   Adult Malnutrition type: Chronic illness  Adult Degree of Malnutrition: Malnutrition of moderate degree  Malnutrition Characteristics: Weight loss, Inadequate energy, Fat loss                  360 Statement: Malnutrition related to inadequate energy intake as evidenced by 15#(9%) weight loss from 8/7/# to 10/4/#, subcutaneous fat loss orbital/cheek region and extremities, <75% energy intake compared to estimated needs >1 month.    BMI Findings:           Body mass index is 21.11 kg/m².       Subjective   Gold Van is a 78 y.o. male with history of hypertension, type 2 diabetes, COPD, chronic steroid use who presented to the [facility] initially on 9/29/2024 for shortness of breath. Patient with a history of COPD and asthma and recent COVID infection with hospitalization. He arrived via ambulance with a CPAP on and then required a transition to BiPAP in the ER. He was initiated on IV cefepime for suspected pneumonia and also IV steroids. He met sepsis criteria for the tachycardia and leukocytosis in the setting of dental pneumonia. He had some difficulty with coordination of his right leg during a PT session as of 10/1 and neurology evaluated the patient and recommended stroke. An MRI of the brain did note multiple acute infarcts in the bilateral frontoparietal cortices and subcortical white matter changes without evidence of mass  effect or hemorrhagic transformation. Patient was started on dual antiplatelet therapy and statin and additionally had a CT of the chest abdomen pelvis to rule out other causes of hypercoagulability, stroke, malignancy. Hypercoagulability it was thought to be related to his prior COVID infection. He was on telemetry without any issues and no atrial fibrillation noted. He did have a TTE that noted some wall motion abnormality in the posterior sections. Plan for outpatient monitor and stress test. Pulmonology also following the patient and he continued with cefepime and azithromycin and is discharging on 2 additional days for coverage of Pseudomonas. This was grown in the sputum and was recommended by pulmonology. The patient was evaluated by the Rehabilitation team and deemed an appropriate candidate for comprehensive inpatient rehabilitation and admitted to the [facility] on 10/4/2024 1:24 PM     Chief Complaint: f/u stroke and platelet agents, medication questions    Interval: Patient seen and evaluated in room.  No acute events overnight.  Spoke with wife as well who was present via the phone.  She has some concerns about antiplatelet agents and his bleeding.  We did talk about the overall risk for stroke if he were to decrease these medications or remove them.  We also talked about the benefits of the medications over the potential risks for bruising and cuts on the legs which is something that has occurred at times.  He will need dual antiplatelet therapy for 90 days with statin.  And then can potentially transition to monotherapy but will need to follow-up with neurology as an outpatient.  No fever, chills, nausea, vomiting, cough or suspect of diarrhea constipation    Objective :  Temp:  [97.1 °F (36.2 °C)-98.2 °F (36.8 °C)] 98.2 °F (36.8 °C)  HR:  [54-87] 87  BP: (129-136)/(58-63) 129/58  Resp:  [18] 18  SpO2:  [95 %-98 %] 98 %  O2 Device: None (Room air)    Functional Update:  Physical Therapy Occupational Therapy  Speech Therapy   Weight Bearing Status: Weight Bearing as Tolerated  Transfers: Incidental Touching, Supervision  Bed Mobility: Supervision, Independent  Amulation Distance (ft): 190 feet  Ambulation: Minimal Assistance  Assistive Device for Ambulation: Single Point Cane  Wheelchair Mobility Distance:  (N/A)  Wheelchair Mobility:  (N/A)  Number of Stairs: 14  Assistive Device for Stairs: Right Hand Rail (single HR + SPC)  Stair Assistance: Minimal Assistance (CGA/Virgie)  Ramp: Moderate Assistance  Assistive Device for Ramp: Roller Walker  Discharge Recommendations: Home with:  DC Home with:: Family Support, Outpatient Physical Therapy   Eating: Independent  Grooming: Supervision  Bathing: Minimal Assistance  Bathing: Minimal Assistance  Upper Body Dressing: Supervision  Lower Body Dressing: Minimal Assistance  Toileting: Incidental Touching  Tub/Shower Transfer: Minimal Assistance  Toilet Transfer: Incidental Touching  Cognition: Within Defined Limits  Orientation: Person, Place, Time, Situation                 Physical Exam  Vitals reviewed.   Constitutional:       General: He is not in acute distress.  HENT:      Head: Normocephalic and atraumatic.      Right Ear: External ear normal.      Left Ear: External ear normal.      Nose: Nose normal. No rhinorrhea.      Mouth/Throat:      Mouth: Mucous membranes are moist.      Pharynx: Oropharynx is clear.   Eyes:      General: No scleral icterus.  Cardiovascular:      Rate and Rhythm: Normal rate.      Pulses: Normal pulses.   Pulmonary:      Effort: Pulmonary effort is normal. No respiratory distress.   Abdominal:      General: There is no distension.      Palpations: Abdomen is soft.   Musculoskeletal:      Right lower leg: Edema present.      Left lower leg: Edema present.   Skin:     General: Skin is warm and dry.      Comments: Diffuse bruising in different areas with healing skin tear on the left anterior shin   Neurological:      Mental Status: He is alert and  oriented to person, place, and time.      Comments: Dysmetria in the right hemibody and left side.  With a sensory deficits in the bilateral lower extremities.   Psychiatric:         Mood and Affect: Mood normal.         Behavior: Behavior normal.           Scheduled Meds:  Current Facility-Administered Medications   Medication Dose Route Frequency Provider Last Rate    acetaminophen  650 mg Oral Q6H PRN Annie L Dagnall, PA-C      albuterol  2 puff Inhalation Q4H PRN Annie L Dagnall, PA-C      albuterol  2.5 mg Nebulization Q4H PRN Annie L Dagnall, PA-C      amLODIPine  5 mg Oral BID Annie L Dagnall, PA-C      aspirin  81 mg Oral Daily Annie L Dagnall, PA-C      atenolol  25 mg Oral Daily Annie L Dagnall, PA-C      atorvastatin  40 mg Oral QPM Annie L Dagnall, PA-C      benzonatate  100 mg Oral TID PRN Annie L Dagnall, PA-C      Budeson-Glycopyrrol-Formoterol  2 puff Inhalation Q12H Annie L Dagnall, PA-C      clopidogrel  75 mg Oral Daily Annie L Dagnall, PA-C      enoxaparin  40 mg Subcutaneous Daily Annie L Dagnall, PA-C      famotidine  20 mg Oral BID Annie L Dagnall, PA-C      glipiZIDE  2.5 mg Oral BID With Meals Annie L Dagnall, PA-C      guaiFENesin  600 mg Oral BID Annie L Dagnall, PA-C      insulin lispro  1-6 Units Subcutaneous HS Annie L Dagnall, PA-C      insulin lispro  2-12 Units Subcutaneous TID AC Annie L Dagnall, PA-C      levalbuterol  1.25 mg Nebulization TID Annie L Dagnall, PA-C      lisinopril  20 mg Oral BID Annie L Dagnall, PA-C      LORazepam  0.5 mg Oral HS PRN Annie L Dagnall, PA-C      metFORMIN  750 mg Oral Daily With Dinner Christian Hendrickson MD      montelukast  10 mg Oral HS Annie L Dagnall, PA-C      polyethylene glycol  17 g Oral Daily PRN Christian Hendrickson MD      predniSONE  15 mg Oral Every Other Day Annie L Dagnall, PA-C      predniSONE  5 mg Oral Every Other Day Annie L Dagnall, PA-C      senna-docusate sodium  1 tablet Oral HS PRN Hungarian  MD Emeka      sodium chloride  4 mL Nebulization TID Annie Martines PA-C           Lab Results: I have reviewed the following results:  Results from last 7 days   Lab Units 10/05/24  0533   HEMOGLOBIN g/dL 11.7*   HEMATOCRIT % 35.2*   WBC Thousand/uL 8.78   PLATELETS Thousands/uL 252     Results from last 7 days   Lab Units 10/05/24  0533   BUN mg/dL 23   SODIUM mmol/L 133*   POTASSIUM mmol/L 3.9   CHLORIDE mmol/L 103   CREATININE mg/dL 0.83   AST U/L 13   ALT U/L 21                Brandon Munoz,   Physical Medicine and Rehabilitation  [facility]    I have spent a total time of 35 minutes in caring for this patient on the day of the visit/encounter including Patient and family education, Importance of tx compliance, Risk factor reductions, Documenting in the medical record, Reviewing / ordering tests, medicine, procedures  , and Communicating with other healthcare professionals .

## 2024-10-11 NOTE — PLAN OF CARE
Problem: Neurological Deficit  Goal: Neurological status is stable or improving  Description: Interventions:  - Monitor and assess patient's level of consciousness, motor function, sensory function, and level of assistance needed for ADLs.   - Monitor and report changes from baseline. Collaborate with interdisciplinary team to initiate plan and implement interventions as ordered.   - Provide and maintain a safe environment.  - Consider seizure precautions.  - Consider fall precautions.  - Consider aspiration precautions.  - Consider bleeding precautions.  Outcome: Progressing     Problem: PAIN - ADULT  Goal: Verbalizes/displays adequate comfort level or baseline comfort level  Description: Interventions:  - Encourage patient to monitor pain and request assistance  - Assess pain using appropriate pain scale  - Administer analgesics based on type and severity of pain and evaluate response  - Implement non-pharmacological measures as appropriate and evaluate response  - Consider cultural and social influences on pain and pain management  - Notify physician/advanced practitioner if interventions unsuccessful or patient reports new pain  Outcome: Progressing      1 = Total assistance

## 2024-10-11 NOTE — PROGRESS NOTES
"   10/11/24 0858   Pain Assessment   Pain Assessment Tool 0-10   Pain Score No Pain   Restrictions/Precautions   Precautions Bed/chair alarms;Fall Risk;Supervision on toilet/commode;Visual deficit  (R macular degeneration and cataracts)   Weight Bearing Restrictions No   ROM Restrictions No   Cognition   Overall Cognitive Status WFL   Arousal/Participation Alert;Responsive;Cooperative   Attention Attends with cues to redirect   Orientation Level Oriented X4   Memory Within functional limits   Following Commands Follows one step commands without difficulty   Subjective   Subjective \"my left leg feels weaker than my right\"   Roll Left and Right   Comment Pt OOB   Sit to Lying   Comment Pt OOB   Lying to Sitting on Side of Bed   Comment Pt OOB   Sit to Stand   Type of Assistance Needed Incidental touching   Physical Assistance Level No physical assistance   Comment With RW   Sit to Stand CARE Score 4   Bed-Chair Transfer   Type of Assistance Needed Incidental touching   Physical Assistance Level No physical assistance   Comment With RW   Chair/Bed-to-Chair Transfer CARE Score 4   Car Transfer   Type of Assistance Needed Physical assistance   Physical Assistance Level 25% or less   Comment simulated through NuStep, requiring min A x 1 and cues for sequence and technique   Car Transfer CARE Score 3   Walk 10 Feet   Type of Assistance Needed Incidental touching;Verbal cues   Physical Assistance Level No physical assistance   Comment CG with RW on level and unlevel surfaces. Cues for steadying when initiating gait.   Walk 10 Feet CARE Score 4   Walk 50 Feet with Two Turns   Type of Assistance Needed Incidental touching;Verbal cues   Physical Assistance Level No physical assistance   Comment CG with RW on level and unlevel surfaces. Cues for steadying when initiating gait.   Walk 50 Feet with Two Turns CARE Score 4   Walk 150 Feet   Type of Assistance Needed Incidental touching;Verbal cues   Physical Assistance Level No " physical assistance   Comment CG with RW on level and unlevel surfaces. Cues for steadying when initiating gait.   Walk 150 Feet CARE Score 4   Walking 10 Feet on Uneven Surfaces   Type of Assistance Needed Incidental touching;Verbal cues   Physical Assistance Level No physical assistance   Comment CG with RW on level and unlevel surfaces. Cues for steadying when initiating gait.   Walking 10 Feet on Uneven Surfaces CARE Score 4   Ambulation   Primary Mode of Locomotion Prior to Admission Walk   Distance Walked (feet) 314 ft  (314 and 125 ft)   Assist Device Roller Walker   Gait Pattern Ataxic;Inconsistant Marsha;Decreased foot clearance;R foot drag;Shuffle   Limitations Noted In Balance;Coordination;Device Management;Heel Strike;Endurance;Posture;Safety;Strength   Provided Assistance with: Balance;Trunk Support   Walk Assist Level Contact Guard   Findings CG with RW on level and unlevel surfaces. Cues for steadying when initiating gait.   Does the patient walk? 2. Yes   Wheelchair mobility   Does the patient use a wheelchair? 1. Yes   Curb or Single Stair   Style negotiated Single stair   Type of Assistance Needed Incidental touching;Verbal cues   Physical Assistance Level No physical assistance   Comment CG with memo HR and reciprocal/non recirpocal pattern for 7 steps. Then second trial of 7 steps with memo HR and reciprocal pattern up 3 down 4, then R HR up 4 and dowm 3. Cues for sequence and technique.   1 Step (Curb) CARE Score 4   4 Steps   Type of Assistance Needed Incidental touching;Verbal cues   Physical Assistance Level No physical assistance   Comment CG with memo HR and reciprocal/non recirpocal pattern for 7 steps. Then second trial of 7 steps with memo HR and reciprocal pattern up 3 down 4, then R HR up 4 and dowm 3. Cues for sequence and technique.   4 Steps CARE Score 4   12 Steps   Reason if not Attempted Safety concerns   12 Steps CARE Score 88   Stairs   Type Stairs   # of Steps 7  (7 x 2)   Weight  Bearing Precautions WBAT   Assist Devices Bilateral Rail;Single Rail   Findings CG with memo HR and reciprocal/non recirpocal pattern for 7 steps. Then second trial of 7 steps with memo HR and reciprocal pattern up 3 down 4, then R HR up 4 and dowm 3. Cues for sequence and technique.   Picking Up Object   Reason if not Attempted Safety concerns   Picking Up Object CARE Score 88   Toilet Transfer   Comment not needed during session   Therapeutic Interventions   Balance Gait and transfer training   Other Stair negotiation   Equipment Use   NuStep lvl 1 10 min   Assessment   Treatment Assessment Pt agreeable to PT session this morning. no reports of pain throughout session. Pt is CG for transfers with RW. Simulated car transfer with NuStep, requiring min A x 1 with RW. CG for ambulation with RW on level and unlevel surfaces for max distance of 314 ft. Pt ranges from CG to min A x 1 for ambulation depending on fatigue and time of day. CG for stair negotiation with combination of memo HR and R HR assist. Cues for sequence and technique. Pt continues to fatigue quickly with all activities and demonstrated SOB and LE fatigue. Gait and transfer training for increased balance, safety, and independence with funcitonal mobility. Pt appropriate for concurrent therapy to increase motivation and encouragement between patients with similar deficits while completing therapy session. Pt returned to room in recliner with alarms on and all needs within reach.   Problem List Decreased strength;Decreased coordination;Decreased mobility;Impaired balance;Decreased endurance;Impaired judgement;Decreased safety awareness;Impaired vision   Barriers to Discharge Inaccessible home environment;Decreased caregiver support   PT Barriers   Physical Impairment Decreased strength;Decreased range of motion;Decreased endurance;Impaired balance;Decreased mobility;Decreased coordination;Decreased safety awareness;Impaired vision   Functional Limitation  Wheelchair management;Walking;Transfers;Standing;Stair negotiation;Ramp negotiation;Car transfers   Plan   Treatment/Interventions Functional transfer training;LE strengthening/ROM;Elevations;Therapeutic exercise;Endurance training;Equipment eval/education;Bed mobility;Gait training   Progress Progressing toward goals   PT Therapy Minutes   PT Time In 0858   PT Time Out 1000   PT Total Time (minutes) 62   PT Mode of treatment - Individual (minutes) 44   PT Mode of treatment - Concurrent (minutes) 18   PT Mode of treatment - Group (minutes) 0   PT Mode of treatment - Co-treat (minutes) 0   PT Mode of Treatment - Total time(minutes) 62 minutes   PT Cumulative Minutes 723   Therapy Time missed   Time missed? No

## 2024-10-11 NOTE — ASSESSMENT & PLAN NOTE
MRI revealed multiple foci of acute infarcts in the bilateral frontoparietal cortices and subcortical white matter left > right  Placed on DAPT and statin for secondary stroke prophylaxis per neurology and follow up with Neuro in 6 weeks (DAPT for 3 months)  Etiology thought to be coagulopathy secondary to COVID infection  CT CAP completed to rule out malignancy  TTE showing 50% EF but posterior wall hypokinesis and will need cardiology follow up  Patient endorses improvement in right sided weakness. Has ongoing dysmetria and incoordination in the right nahomi body  Physical, Occupational, and Speech therapy

## 2024-10-11 NOTE — PROGRESS NOTES
10/11/24 1000   Pain Assessment   Pain Assessment Tool 0-10   Pain Score No Pain   Restrictions/Precautions   Precautions Bed/chair alarms;Fall Risk;Supervision on toilet/commode;Visual deficit   Cognitive Linguisitic Assessments   Cognitive Linquistic Quick Test (CLQT) RBANS adminsitered 10/9/240 reviewed results with patient 10/10/24   Comprehension   Assist Devices Glasses   Comprehension (FIM) 6 - Has only MILD difficulty with complex/abstract info   Expression   Expression (FIM) 7 - Expresses complex/abstract ideas in a reasonable time w/o devices or helper.   Social Interaction   Social Interaction (FIM) 7 - Interacts appropriately without assistive device, medication or helper   Problem Solving   Problem solving (FIM) 6 - Solves complex problems BUT requires extra time   Memory   Memory (FIM) 6 - Recognizes with extra time   Memory Skills   Orientation Level Oriented X4   Speech/Language/Cognition Assessmetn   Treatment Assessment poroblem solving completed during a facetime conversation with his wife with min-mod cue to coordinate camera's. Completed again towards middle of his session to complete a video call together with patient, wife and Dr. Munoz. He did require min verbal cue to initiate the facetime call through his contact list. He was able to complete 5 letter word unscramble 5/6 trials jere. He completed a deductive reasoning puzzle 3x4 with 8 detailed clues with min-mod cue x2 otherwise solved jere. Increased complexity with deductive reasoning WALC puzzle #1 which is a 4x3 with 10 less detailed clues partially completed with mod cue throughout. Unable to solve by end of session, will reattempt next session.   SLP Therapy Minutes   SLP Time In 1000   SLP Time Out 1100   SLP Total Time (minutes) 60   SLP Mode of treatment - Individual (minutes) 60   SLP Mode of treatment - Concurrent (minutes) 0   SLP Mode of treatment - Group (minutes) 0   SLP Mode of treatment - Co-treat (minutes) 0   SLP Mode  of Treatment - Total time(minutes) 60 minutes   SLP Cumulative Minutes 174

## 2024-10-11 NOTE — PROGRESS NOTES
Progress Note - Gold Van 78 y.o. male MRN: [id number]    Unit/Bed#: [facility] [place] 216-01 Encounter: [id number]        Subjective:   Patient seen and examined at bedside after reviewing the chart and discussing the case with the caring staff.      Patient examined at bedside.  Patient denies any acute symptoms today.     Physical Exam   Vitals: Blood pressure 129/58, pulse 87, temperature 98.2 °F (36.8 °C), temperature source Temporal, resp. rate 18, height 6' (1.829 m), weight 70.6 kg (155 lb 10.3 oz), SpO2 98%.,Body mass index is 21.11 kg/m².  Constitutional: Patient in no acute distress.  HEENT: PERR, EOMI, MMM.  Cardiovascular: Normal rate and regular rhythm.    Pulmonary/Chest: Effort normal and breath sounds normal.   Abdomen: Soft, + BS, NT.    Assessment/Plan:  Gold Van is a(n) 78 y.o. male with CVA.     Hypertension.  Patient is on lisinopril 20 mg twice daily, amlodipine 5 mg twice daily, atenolol 25 mg daily.  Type 2 diabetes mellitus.  Hgb A1c 10.6% on 10/2/24.  Restart home glipizide 2.5 mg twice daily 10/4 and and discontinue Lantus.  Wife brought in metformin  mg daily.  SSI with accu-checks ac/hs.  Carb controlled diet.  GERD.  Patient is on famotidine 20 mg twice daily.   Chronic generalized pruritus.  Ongoing past 5-6 years.  Pt/wife reports receiving extensive workup in the past from various specialists including dermatology and allergist.  Continue alternating prednisone 5 mg and 15 mg daily.    COPD/asthma.  Arslantri brought from home.  Continue Singulair 10 mg nightly and Xopenox/sodium chloride neb TID.  Pt follows with [facility] pulmonology.    Pneumonia.  Sputum C&S on 10/1 positive for pseudomonas.  Patient was treated with IV cefepime and then transitioned to PO Vantin to complete 5-day course of antibiotics as recommended by pulmonology.  Continue Vantin 400 mg twice daily x 4 more doses (thru 10/6), Mucinex 600 mg twice daily, Xopenox/sodium chloride neb TID.  Tessalon perles as  needed.  Incentive spirometry.   Abnormal echocardiogram.  TTE on 10/1 with posterior wall motion abnormality.  Cardiology saw patient on acute care and recommended outpatient stress test.  Hyponatremia.  Level 134 -> 133 on 10/5/24.  Cont to monitor.    Malnutrition.  Adult Malnutrition type: Chronic illness.  Adult Degree of Malnutrition: Malnutrition of moderate degree. Malnutrition Characteristics: Weight loss, Inadequate energy, Fat loss.  Dietician following.  Pain and bowel regimen.  As per physiatry.  Patient started on Senokot S daily as needed and MiraLAX as needed 10/5/2024.  CVA.   Neurology recommended DAPT with Plavix/ASA x90 days total (~12/29/24) then ASA monotherapy.  Cont atorvastatin 40 mg daily.  Follow-up with neurology in 6 weeks.  Follow-up with cardiology for Zio patch and/or loop recorder on discharge.  Patient is receiving intensive PT OT with management as per physiatry.     Anticipated date of discharge:  10/17    The patient was discussed with Dr. Hendrickson and he is in agreement with the above note.

## 2024-10-11 NOTE — PROGRESS NOTES
10/11/24 1231   Pain Assessment   Pain Assessment Tool 0-10   Pain Score No Pain   Restrictions/Precautions   Precautions Bed/chair alarms;Fall Risk;Supervision on toilet/commode;Visual deficit   Shower/Bathe Self   Type of Assistance Needed Supervision   Shower/Bathe Self CARE Score 4   Bathing   Assessed Bath Style Shower   Anticipated D/C Bath Style Shower   Able to Adjust Water Temperature Yes   Able to Wash/Rinse/Dry (body part) Left Arm;Right Arm;L Upper Leg;R Upper Leg;L Lower Leg/Foot;R Lower Leg/Foot;Chest;Abdomen;Perineal Area;Buttocks   Limitations Noted in Balance;Coordination;Endurance;Safety;Strength   Positioning Seated;Standing   Adaptive Equipment Shower Seat;Shower Bars;Hand Held Shower   Tub/Shower Transfer   Limitations Noted In Balance;Coordination;Endurance;Safety;LE Strength;UE Strength   Adaptive Equipment Grab Bars;Seat with Back   Assessed Shower   Upper Body Dressing   Type of Assistance Needed Set-up / clean-up   Upper Body Dressing CARE Score 5   Lower Body Dressing   Type of Assistance Needed Physical assistance   Physical Assistance Level 26%-50%   Comment Assist to thread LLE over foot and through pant hole   Lower Body Dressing CARE Score 3   Putting On/Taking Off Footwear   Type of Assistance Needed Supervision   Putting On/Taking Off Footwear CARE Score 4   Dressing/Undressing Clothing   Remove UB Clothes Pullover Shirt   Don UB Clothes Pullover Shirt   Remove LB Clothes Pants   Don LB Clothes Pants;Shoes;Socks   Limitations Noted In Balance;Coordination;Endurance;Safety;Strength   Positioning Supported Sit;Standing  (RW when standing for support)   Sit to Stand   Type of Assistance Needed Incidental touching  (CG)   Comment RW   Sit to Stand CARE Score 4   Bed-Chair Transfer   Type of Assistance Needed Incidental touching  (CG)   Comment RW   Chair/Bed-to-Chair Transfer CARE Score 4   Light Housekeeping   Light Housekeeping Level Wheelchair   Light Housekeeping Level of  Assistance Close supervision   Light Housekeeping Pt obtained dirty clothing and retrieved plastic bag from closet to put the clothes in and hung back in closet.   Functional Standing Tolerance   Time approx 2m   Activity grooming and oral hygeine   Comments Pt stood at sink with RW to complete grooming tasks and oral hygeine with supervision and the w/c behind pt if needed for fatigue.   Cognition   Overall Cognitive Status WFL   Arousal/Participation Alert;Responsive;Cooperative   Attention Within functional limits   Orientation Level Oriented X4   Memory Within functional limits   Following Commands Follows one step commands without difficulty   Additional Activities   Additional Activities Comments fxl mobility with RW to/from room and shower with CG, decreased balance with increase in fatigue and walking distance.   Assessment   Treatment Assessment Pt agreeable to OT session this PM and recieved seated upright in recliner. Pt participated in ADLs, IADLs and fxl mobility/transfers with RW and CG. Refer to respective sections of note for details of session. Rest breaks taken throughout session due to fatigue. VCs to maintain safety during session. Pt barrier remain the same as earlier session. Recommended continued skilled OT services to increased independence with daily tasks.   Problem List Decreased strength;Decreased endurance;Impaired balance;Decreased coordination;Impaired judgement;Decreased safety awareness;Impaired vision   Plan   Treatment/Interventions ADL retraining;Functional transfer training;LE strengthening/ROM;Therapeutic exercise;Endurance training;Patient/family training;Equipment eval/education;Compensatory technique education;OT   Progress Progressing toward goals   OT Therapy Minutes   OT Time In 1231   OT Time Out 1328   OT Total Time (minutes) 57   OT Mode of treatment - Individual (minutes) 57   Therapy Time missed   Time missed? No

## 2024-10-12 LAB
GLUCOSE SERPL-MCNC: 209 MG/DL (ref 65–140)
GLUCOSE SERPL-MCNC: 262 MG/DL (ref 65–140)
GLUCOSE SERPL-MCNC: 287 MG/DL (ref 65–140)
GLUCOSE SERPL-MCNC: 94 MG/DL (ref 65–140)

## 2024-10-12 PROCEDURE — 97530 THERAPEUTIC ACTIVITIES: CPT

## 2024-10-12 PROCEDURE — 97542 WHEELCHAIR MNGMENT TRAINING: CPT

## 2024-10-12 PROCEDURE — 97116 GAIT TRAINING THERAPY: CPT

## 2024-10-12 PROCEDURE — 82948 REAGENT STRIP/BLOOD GLUCOSE: CPT

## 2024-10-12 PROCEDURE — 97110 THERAPEUTIC EXERCISES: CPT

## 2024-10-12 RX ADMIN — GUAIFENESIN 600 MG: 600 TABLET ORAL at 09:36

## 2024-10-12 RX ADMIN — ATORVASTATIN CALCIUM 40 MG: 40 TABLET, FILM COATED ORAL at 17:53

## 2024-10-12 RX ADMIN — LISINOPRIL 20 MG: 20 TABLET ORAL at 20:31

## 2024-10-12 RX ADMIN — SODIUM CHLORIDE SOLN NEBU 3% 4 ML: 3 NEBU SOLN at 19:52

## 2024-10-12 RX ADMIN — LEVALBUTEROL HYDROCHLORIDE 1.25 MG: 1.25 SOLUTION RESPIRATORY (INHALATION) at 19:52

## 2024-10-12 RX ADMIN — ALBUTEROL SULFATE 2 PUFF: 90 AEROSOL, METERED RESPIRATORY (INHALATION) at 09:29

## 2024-10-12 RX ADMIN — LEVALBUTEROL HYDROCHLORIDE 1.25 MG: 1.25 SOLUTION RESPIRATORY (INHALATION) at 08:02

## 2024-10-12 RX ADMIN — BUDESONIDE, GLYCOPYRROLATE, AND FORMOTEROL FUMARATE 2 PUFF: 160; 9; 4.8 AEROSOL, METERED RESPIRATORY (INHALATION) at 07:28

## 2024-10-12 RX ADMIN — PREDNISONE 15 MG: 5 TABLET ORAL at 09:35

## 2024-10-12 RX ADMIN — FAMOTIDINE 20 MG: 20 TABLET, FILM COATED ORAL at 17:53

## 2024-10-12 RX ADMIN — INSULIN LISPRO 3 UNITS: 100 INJECTION, SOLUTION INTRAVENOUS; SUBCUTANEOUS at 21:05

## 2024-10-12 RX ADMIN — METFORMIN HYDROCHLORIDE 750 MG: 750 TABLET, EXTENDED RELEASE ORAL at 16:39

## 2024-10-12 RX ADMIN — BUDESONIDE, GLYCOPYRROLATE, AND FORMOTEROL FUMARATE 2 PUFF: 160; 9; 4.8 AEROSOL, METERED RESPIRATORY (INHALATION) at 19:17

## 2024-10-12 RX ADMIN — SODIUM CHLORIDE SOLN NEBU 3% 4 ML: 3 NEBU SOLN at 16:09

## 2024-10-12 RX ADMIN — AMLODIPINE BESYLATE 5 MG: 5 TABLET ORAL at 20:30

## 2024-10-12 RX ADMIN — INSULIN LISPRO 2 UNITS: 100 INJECTION, SOLUTION INTRAVENOUS; SUBCUTANEOUS at 16:39

## 2024-10-12 RX ADMIN — GLIPIZIDE 2.5 MG: 2.5 TABLET, EXTENDED RELEASE ORAL at 07:28

## 2024-10-12 RX ADMIN — GUAIFENESIN 600 MG: 600 TABLET ORAL at 17:53

## 2024-10-12 RX ADMIN — ENOXAPARIN SODIUM 40 MG: 40 INJECTION SUBCUTANEOUS at 09:35

## 2024-10-12 RX ADMIN — FAMOTIDINE 20 MG: 20 TABLET, FILM COATED ORAL at 09:36

## 2024-10-12 RX ADMIN — LEVALBUTEROL HYDROCHLORIDE 1.25 MG: 1.25 SOLUTION RESPIRATORY (INHALATION) at 16:09

## 2024-10-12 RX ADMIN — AMLODIPINE BESYLATE 5 MG: 5 TABLET ORAL at 09:36

## 2024-10-12 RX ADMIN — LISINOPRIL 20 MG: 20 TABLET ORAL at 09:36

## 2024-10-12 RX ADMIN — GLIPIZIDE 2.5 MG: 2.5 TABLET, EXTENDED RELEASE ORAL at 16:39

## 2024-10-12 RX ADMIN — MONTELUKAST 10 MG: 10 TABLET, FILM COATED ORAL at 21:05

## 2024-10-12 RX ADMIN — SODIUM CHLORIDE SOLN NEBU 3% 4 ML: 3 NEBU SOLN at 08:02

## 2024-10-12 RX ADMIN — ASPIRIN 81 MG 81 MG: 81 TABLET ORAL at 09:35

## 2024-10-12 RX ADMIN — CLOPIDOGREL 75 MG: 75 TABLET ORAL at 09:36

## 2024-10-12 RX ADMIN — ATENOLOL 25 MG: 25 TABLET ORAL at 09:36

## 2024-10-12 RX ADMIN — INSULIN LISPRO 6 UNITS: 100 INJECTION, SOLUTION INTRAVENOUS; SUBCUTANEOUS at 11:26

## 2024-10-12 NOTE — PROGRESS NOTES
10/12/24 1229   Pain Assessment   Pain Assessment Tool 0-10   Pain Score No Pain  (pt reports tightness in chest and SOB)   Restrictions/Precautions   Precautions Bed/chair alarms;Fall Risk;Visual deficit;Supervision on toilet/commode   Bed-Chair Transfer   Type of Assistance Needed Incidental touching;Verbal cues   Comment VCs to turn around completely before sitting down.   Chair/Bed-to-Chair Transfer CARE Score 4   Exercise Tools   Other Exercise Tool 1 card sorting activity w/c level to address fine motor skills, in hand manipulation, cognition and visual scanning.   Other Exercise Tool 2 fxl reacher activity to retrieve clothes pins from floor and place into container on table.   Coordination   Fine Motor BUE to pinch clothes pins of various resitances onto ada.   Cognition   Overall Cognitive Status WFL   Arousal/Participation Alert;Responsive;Cooperative   Attention Within functional limits   Orientation Level Oriented X4   Memory Within functional limits   Following Commands Follows one step commands without difficulty   Assessment   Treatment Assessment Pt agreeable to OT session this afternoon, recieved seated in recliner. Pt reports tightness in his chest and SOB with nursing aware of complaints. Pt participated in theract and w/c mobility to/from room. Energy conservation techniques reviewed with pt by recommending using the w/c instead of the walker today due to his SOB and task modification to ensure well tolerated activities. Barriers include decreased strength/endurance/coordination, impaired balance, decreased safety and awareness/judgement. Pt will benefit from continued skilled OT services to address barriers and increase independence with daily tasks.   Prognosis Good   Problem List Decreased strength;Decreased endurance;Impaired balance;Decreased safety awareness;Decreased coordination;Impaired judgement   Plan   Treatment/Interventions ADL retraining;LE strengthening/ROM;Functional transfer  training;Therapeutic exercise;Endurance training;Patient/family training;Equipment eval/education;Compensatory technique education;OT   Progress Progressing toward goals   OT Therapy Minutes   OT Time In 1229   OT Time Out 1326   OT Total Time (minutes) 57   OT Mode of treatment - Individual (minutes) 57   Therapy Time missed   Time missed? No     Note and treatment completed in collaboration with OTR.

## 2024-10-12 NOTE — PROGRESS NOTES
10/12/24 1035   Pain Assessment   Pain Assessment Tool 0-10   Pain Score No Pain   Restrictions/Precautions   Precautions Bed/chair alarms;Fall Risk;Supervision on toilet/commode;Visual deficit  (R eye macular degeneration)   Cognition   Overall Cognitive Status WFL   Arousal/Participation Alert;Responsive;Cooperative   Attention Within functional limits   Orientation Level Oriented X4   Memory Within functional limits   Following Commands Follows one step commands without difficulty   Sit to Stand   Type of Assistance Needed Incidental touching;Verbal cues   Comment cg with RW   Sit to Stand CARE Score 4   Bed-Chair Transfer   Type of Assistance Needed Incidental touching;Verbal cues   Comment cg with RW; cues to turn all the way around to sit   Chair/Bed-to-Chair Transfer CARE Score 4   Walk 10 Feet   Type of Assistance Needed Incidental touching;Physical assistance;Verbal cues   Physical Assistance Level 25% or less   Comment cg/occ MIN A level and unlevel surfaces with RW; reports legs are fatigued   Walk 10 Feet CARE Score 3   Walk 50 Feet with Two Turns   Type of Assistance Needed Incidental touching;Physical assistance;Verbal cues   Physical Assistance Level 25% or less   Comment cg/occ MIN A level and unlevel surfaces with RW; reports legs are fatigued   Walk 50 Feet with Two Turns CARE Score 3   Walk 150 Feet   Type of Assistance Needed Incidental touching;Physical assistance;Verbal cues   Physical Assistance Level 25% or less   Comment cg/occ MIN A level and unlevel surfaces with RW; reports legs are fatigued   Walk 150 Feet CARE Score 3   Walking 10 Feet on Uneven Surfaces   Type of Assistance Needed Incidental touching;Physical assistance;Verbal cues   Physical Assistance Level 25% or less   Comment cg/occ MIN A level and unlevel surfaces with RW; reports legs are fatigued   Walking 10 Feet on Uneven Surfaces CARE Score 3   Ambulation   Primary Mode of Locomotion Prior to Admission Walk   Distance  Walked (feet) 207 ft  (household distances around PT gym 207' 106' 127')   Assist Device Roller Walker   Gait Pattern Ataxic;Inconsistant Marsha;Decreased foot clearance;R foot drag;Shuffle   Limitations Noted In Balance;Coordination;Device Management;Heel Strike;Endurance;Posture;Safety;Strength   Provided Assistance with: Balance;Trunk Support   Walk Assist Level Contact Guard;Minimum Assist   Findings cg/occ MIN A level and unlevel surfaces with RW; reports legs are fatigued   Does the patient walk? 2. Yes   Wheel 50 Feet with Two Turns   Type of Assistance Needed Independent   Comment mod I level and unlevel surfaces   Wheel 50 Feet with Two Turns CARE Score 6   Wheel 150 Feet   Type of Assistance Needed Independent   Comment mod I level and unlevel surfaces   Wheel 150 Feet CARE Score 6   Wheelchair mobility   Does the patient use a wheelchair? 1. Yes   Type of Wheelchair Used 1. Manual   Method Right upper extremity;Left upper extremity;Right lower extremity;Left lower extremity   Distance Level Surface (feet) 174 ft  (174')   Distance Wheeled 3% Grade 24 ft   Findings mod I level and unlevel surfaces   Curb or Single Stair   Style negotiated Single stair   Type of Assistance Needed Incidental touching;Physical assistance   Physical Assistance Level 25% or less   Comment cg/occ MIN A for 7 steps using 2 handrails; cues for sequence   1 Step (Curb) CARE Score 3   4 Steps   Type of Assistance Needed Incidental touching;Physical assistance;Verbal cues   Physical Assistance Level 26%-50%   Comment cg/occ MIN A for 7 steps using 2 handrails; cues for sequence   4 Steps CARE Score 3   Stairs   Type Stairs   # of Steps 7   Weight Bearing Precautions WBAT   Assist Devices Bilateral Rail   Findings cg/occ MIN A for 7 steps using 2 handrails; cues for sequence   Therapeutic Interventions   Strengthening seated TE   Balance gait and transfer training with RW   Other stair negotiation   Equipment Use   NuStep L1 5 min;  "unable to complete further distance due to increased LE fatigue   Assessment   Treatment Assessment Patient agreeable to therapy session.  No pain reported, however, patient does report feeling tightness in his chest.   Nursing notified.   Per nursing, patient received all breathing treatments and inhalers this AM.  Patient also reported that his legs feel \"fatigued.\"   Patient requires cues for general safety as well as task sequence.     Utilized RW for mobility today due to reports of fatigue.    Patient unsure if he'll be able to use walker in home, however when asked why not, he was unable to give a reason.    PT explained reason why walker may be a better option to use at this time vs SPC and that he could always progress to cane in the future.  Patient in agreement that he feels less unsteady when using walker, but does express hope to use SPC.   Patient completed ther ex for general LE strengthening; gait and transfer training focusing on sequence and technique for improved balance and safety with functional mobility using RW.  Negotiated steps with bilateral HR and cues for sequence.   Patient remains mod I with wheelchair mobility over level and unlevel surfaces for improved UB and general endurance.   Returned to room in recliner with alarms in place and all needs within reach.   PT Barriers   Physical Impairment Decreased strength;Decreased range of motion;Decreased endurance;Impaired balance;Decreased mobility;Decreased coordination;Decreased safety awareness;Impaired vision   Functional Limitation Wheelchair management;Walking;Transfers;Standing;Stair negotiation;Ramp negotiation;Car transfers   Plan   Treatment/Interventions Functional transfer training;LE strengthening/ROM;Elevations;Therapeutic exercise;Gait training   Progress Progressing toward goals   PT Therapy Minutes   PT Time In 1035   PT Time Out 1200   PT Total Time (minutes) 85   PT Mode of treatment - Individual (minutes) 85   PT Mode of " treatment - Concurrent (minutes) 0   PT Mode of treatment - Group (minutes) 0   PT Mode of treatment - Co-treat (minutes) 0   PT Mode of Treatment - Total time(minutes) 85 minutes   PT Cumulative Minutes 838   Therapy Time missed   Time missed? No

## 2024-10-13 LAB
GLUCOSE SERPL-MCNC: 202 MG/DL (ref 65–140)
GLUCOSE SERPL-MCNC: 209 MG/DL (ref 65–140)
GLUCOSE SERPL-MCNC: 296 MG/DL (ref 65–140)
GLUCOSE SERPL-MCNC: 98 MG/DL (ref 65–140)

## 2024-10-13 PROCEDURE — 97110 THERAPEUTIC EXERCISES: CPT

## 2024-10-13 PROCEDURE — 97110 THERAPEUTIC EXERCISES: CPT | Performed by: PHYSICAL THERAPIST

## 2024-10-13 PROCEDURE — 97116 GAIT TRAINING THERAPY: CPT | Performed by: PHYSICAL THERAPIST

## 2024-10-13 PROCEDURE — 92507 TX SP LANG VOICE COMM INDIV: CPT | Performed by: NURSE PRACTITIONER

## 2024-10-13 PROCEDURE — 97112 NEUROMUSCULAR REEDUCATION: CPT | Performed by: PHYSICAL THERAPIST

## 2024-10-13 PROCEDURE — 97535 SELF CARE MNGMENT TRAINING: CPT

## 2024-10-13 PROCEDURE — 82948 REAGENT STRIP/BLOOD GLUCOSE: CPT

## 2024-10-13 RX ADMIN — FAMOTIDINE 20 MG: 20 TABLET, FILM COATED ORAL at 09:28

## 2024-10-13 RX ADMIN — GLIPIZIDE 2.5 MG: 2.5 TABLET, EXTENDED RELEASE ORAL at 16:27

## 2024-10-13 RX ADMIN — INSULIN LISPRO 6 UNITS: 100 INJECTION, SOLUTION INTRAVENOUS; SUBCUTANEOUS at 16:27

## 2024-10-13 RX ADMIN — BUDESONIDE, GLYCOPYRROLATE, AND FORMOTEROL FUMARATE 2 PUFF: 160; 9; 4.8 AEROSOL, METERED RESPIRATORY (INHALATION) at 07:49

## 2024-10-13 RX ADMIN — LISINOPRIL 20 MG: 20 TABLET ORAL at 09:29

## 2024-10-13 RX ADMIN — SODIUM CHLORIDE SOLN NEBU 3% 4 ML: 3 NEBU SOLN at 08:12

## 2024-10-13 RX ADMIN — CLOPIDOGREL 75 MG: 75 TABLET ORAL at 09:29

## 2024-10-13 RX ADMIN — LEVALBUTEROL HYDROCHLORIDE 1.25 MG: 1.25 SOLUTION RESPIRATORY (INHALATION) at 08:12

## 2024-10-13 RX ADMIN — AMLODIPINE BESYLATE 5 MG: 5 TABLET ORAL at 09:28

## 2024-10-13 RX ADMIN — ATORVASTATIN CALCIUM 40 MG: 40 TABLET, FILM COATED ORAL at 18:27

## 2024-10-13 RX ADMIN — LEVALBUTEROL HYDROCHLORIDE 1.25 MG: 1.25 SOLUTION RESPIRATORY (INHALATION) at 14:20

## 2024-10-13 RX ADMIN — MONTELUKAST 10 MG: 10 TABLET, FILM COATED ORAL at 21:11

## 2024-10-13 RX ADMIN — GUAIFENESIN 600 MG: 600 TABLET ORAL at 18:27

## 2024-10-13 RX ADMIN — METFORMIN HYDROCHLORIDE 750 MG: 750 TABLET, EXTENDED RELEASE ORAL at 16:28

## 2024-10-13 RX ADMIN — FAMOTIDINE 20 MG: 20 TABLET, FILM COATED ORAL at 18:27

## 2024-10-13 RX ADMIN — ASPIRIN 81 MG 81 MG: 81 TABLET ORAL at 09:28

## 2024-10-13 RX ADMIN — LEVALBUTEROL HYDROCHLORIDE 1.25 MG: 1.25 SOLUTION RESPIRATORY (INHALATION) at 20:04

## 2024-10-13 RX ADMIN — GLIPIZIDE 2.5 MG: 2.5 TABLET, EXTENDED RELEASE ORAL at 07:49

## 2024-10-13 RX ADMIN — PREDNISONE 5 MG: 5 TABLET ORAL at 09:27

## 2024-10-13 RX ADMIN — INSULIN LISPRO 2 UNITS: 100 INJECTION, SOLUTION INTRAVENOUS; SUBCUTANEOUS at 21:11

## 2024-10-13 RX ADMIN — SODIUM CHLORIDE SOLN NEBU 3% 4 ML: 3 NEBU SOLN at 14:20

## 2024-10-13 RX ADMIN — GUAIFENESIN 600 MG: 600 TABLET ORAL at 09:28

## 2024-10-13 RX ADMIN — SODIUM CHLORIDE SOLN NEBU 3% 4 ML: 3 NEBU SOLN at 20:04

## 2024-10-13 RX ADMIN — ENOXAPARIN SODIUM 40 MG: 40 INJECTION SUBCUTANEOUS at 09:29

## 2024-10-13 RX ADMIN — ATENOLOL 25 MG: 25 TABLET ORAL at 09:29

## 2024-10-13 RX ADMIN — INSULIN LISPRO 4 UNITS: 100 INJECTION, SOLUTION INTRAVENOUS; SUBCUTANEOUS at 11:25

## 2024-10-13 RX ADMIN — BUDESONIDE, GLYCOPYRROLATE, AND FORMOTEROL FUMARATE 2 PUFF: 160; 9; 4.8 AEROSOL, METERED RESPIRATORY (INHALATION) at 19:20

## 2024-10-13 NOTE — PROGRESS NOTES
10/13/24 1100   Pain Assessment   Pain Assessment Tool 0-10   Pain Score No Pain   Restrictions/Precautions   Precautions Bed/chair alarms;Cognitive;Fall Risk;Supervision on toilet/commode   Cognitive Linguisitic Assessments   Cognitive Linquistic Quick Test (CLQT) RBANS adminsitered 10/9/240 reviewed results with patient 10/10/24   Comprehension   Assist Devices Glasses   Comprehension (FIM) 6 - Has only MILD difficulty with complex/abstract info   Expression   Expression (FIM) 7 - Expresses complex/abstract ideas in a reasonable time w/o devices or helper.   Social Interaction   Social Interaction (FIM) 7 - Interacts appropriately without assistive device, medication or helper   Problem Solving   Problem solving (FIM) 6 - Solves complex problems BUT requires extra time   Memory   Memory (FIM) 6 - Recognizes with extra time   Speech/Language/Cognition Assessmetn   Treatment Assessment dual tasks completed during session. Stood for 10:00 task of a phone conversation and teachback of level 1 pathwords. Sat for a break and stook for another 10:00 to complete pathwords level 2 solved with mod cue. Pt with good daily recall of activities completed this date and upcoming therapy scheduled for his date. Directions of high level tasks repeated throughout activities with slightly slower processing time.   SLP Therapy Minutes   SLP Time In 1130   SLP Time Out 1200   SLP Total Time (minutes) 30   SLP Mode of treatment - Individual (minutes) 30   SLP Mode of treatment - Concurrent (minutes) 0   SLP Mode of treatment - Group (minutes) 0   SLP Mode of treatment - Co-treat (minutes) 0   SLP Mode of Treatment - Total time(minutes) 30 minutes   SLP Cumulative Minutes 204

## 2024-10-13 NOTE — PROGRESS NOTES
10/11/24 1000   Pain Assessment   Pain Assessment Tool 0-10   Pain Score No Pain   Restrictions/Precautions   Precautions Bed/chair alarms;Fall Risk;Supervision on toilet/commode;Visual deficit   Cognitive Linguisitic Assessments   Cognitive Linquistic Quick Test (CLQT) RBANS adminsitered 10/9/240 reviewed results with patient 10/10/24   Comprehension   Assist Devices Glasses   Comprehension (FIM) 6 - Has only MILD difficulty with complex/abstract info   Expression   Expression (FIM) 7 - Expresses complex/abstract ideas in a reasonable time w/o devices or helper.   Social Interaction   Social Interaction (FIM) 7 - Interacts appropriately without assistive device, medication or helper   Problem Solving   Problem solving (FIM) 6 - Solves complex problems BUT requires extra time   Memory   Memory (FIM) 6 - Recognizes with extra time   Memory Skills   Orientation Level Oriented X4   Speech/Language/Cognition Assessmetn   Treatment Assessment problem solving completed during a facetime conversation with his wife with min-mod cue to coordinate camera's. Completed again towards middle of his session to complete a video call together with patient, wife and Dr. Munoz. He did require min verbal cue to initiate the facetime call through his contact list. He was able to complete 5 letter word unscramble 5/6 trials jere. He completed a deductive reasoning puzzle 3x4 with 8 detailed clues with min-mod cue x2 otherwise solved jere. Increased complexity with deductive reasoning WALC puzzle #1 which is a 4x3 with 10 less detailed clues partially completed with mod cue throughout. Unable to solve by end of session, will reattempt next session.   SLP Therapy Minutes   SLP Time In 1000   SLP Time Out 1100   SLP Total Time (minutes) 60   SLP Mode of treatment - Individual (minutes) 60   SLP Mode of treatment - Concurrent (minutes) 0   SLP Mode of treatment - Group (minutes) 0   SLP Mode of treatment - Co-treat (minutes) 0   SLP Mode  of Treatment - Total time(minutes) 60 minutes   SLP Cumulative Minutes 174        10/11/24 1000   Pain Assessment   Pain Assessment Tool 0-10   Pain Score No Pain   Restrictions/Precautions   Precautions Bed/chair alarms;Fall Risk;Supervision on toilet/commode;Visual deficit   Cognitive Linguisitic Assessments   Cognitive Linquistic Quick Test (CLQT) RBANS adminsitered 10/9/240 reviewed results with patient 10/10/24   Comprehension   Assist Devices Glasses   Comprehension (FIM) 6 - Has only MILD difficulty with complex/abstract info   Expression   Expression (FIM) 7 - Expresses complex/abstract ideas in a reasonable time w/o devices or helper.   Social Interaction   Social Interaction (FIM) 7 - Interacts appropriately without assistive device, medication or helper   Problem Solving   Problem solving (FIM) 6 - Solves complex problems BUT requires extra time   Memory   Memory (FIM) 6 - Recognizes with extra time   Memory Skills   Orientation Level Oriented X4   Speech/Language/Cognition Assessmetn   Treatment Assessment problem solving completed during a facetime conversation with his wife with min-mod cue to coordinate camera's. Completed again towards middle of his session to complete a video call together with patient, wife and Dr. Munoz. He did require min verbal cue to initiate the facetime call through his contact list. He was able to complete 5 letter word unscramble 5/6 trials jere. He completed a deductive reasoning puzzle 3x4 with 8 detailed clues with min-mod cue x2 otherwise solved jere. Increased complexity with deductive reasoning WALC puzzle #1 which is a 4x3 with 10 less detailed clues partially completed with mod cue throughout. Unable to solve by end of session, will reattempt next session.   SLP Therapy Minutes   SLP Time In 1000   SLP Time Out 1100   SLP Total Time (minutes) 60   SLP Mode of treatment - Individual (minutes) 60   SLP Mode of treatment - Concurrent (minutes) 0   SLP Mode of treatment  - Group (minutes) 0   SLP Mode of treatment - Co-treat (minutes) 0   SLP Mode of Treatment - Total time(minutes) 60 minutes   SLP Cumulative Minutes 174

## 2024-10-13 NOTE — PROGRESS NOTES
10/13/24 0830   Pain Assessment   Pain Assessment Tool 0-10   Pain Score No Pain   Restrictions/Precautions   Precautions Bed/chair alarms;Fall Risk;Supervision on toilet/commode;Visual deficit   Cognition   Overall Cognitive Status WFL   Subjective   Subjective I think I'm breathing better today   Roll Left and Right   Comment Pt OOB   Sit to Lying   Comment Pt OOB   Lying to Sitting on Side of Bed   Comment Pt OOB   Sit to Stand   Type of Assistance Needed Supervision   Physical Assistance Level No physical assistance   Comment CGA, RW   Sit to Stand CARE Score 4   Bed-Chair Transfer   Type of Assistance Needed Supervision   Physical Assistance Level No physical assistance   Comment RW   Chair/Bed-to-Chair Transfer CARE Score 4   Car Transfer   Comment Typically rides in a larger  truck   Reason if not Attempted Environmental limitations   Car Transfer CARE Score 10   Walk 10 Feet   Type of Assistance Needed Supervision   Physical Assistance Level No physical assistance   Comment RW, Cl S/CGA   Walk 10 Feet CARE Score 4   Walk 50 Feet with Two Turns   Type of Assistance Needed Supervision   Physical Assistance Level No physical assistance   Comment RW, Cl S/CGA   Walk 50 Feet with Two Turns CARE Score 4   Walk 150 Feet   Type of Assistance Needed Supervision   Physical Assistance Level No physical assistance   Comment RW, Cl S/CGA   Walk 150 Feet CARE Score 4   Walking 10 Feet on Uneven Surfaces   Type of Assistance Needed Incidental touching   Physical Assistance Level No physical assistance   Comment CGA/occ min A, RW   Walking 10 Feet on Uneven Surfaces CARE Score 4   Ambulation   Primary Mode of Locomotion Prior to Admission Walk   Distance Walked (feet) 165 ft   Assist Device Roller Walker   Does the patient walk? 2. Yes   Curb or Single Stair   Style negotiated Single stair   Type of Assistance Needed Incidental touching   Physical Assistance Level No physical assistance   Comment CGA, 7 steps   1  Step (Curb) CARE Score 4   4 Steps   Type of Assistance Needed Incidental touching   Physical Assistance Level No physical assistance   Comment CGA, 14 steps   4 Steps CARE Score 4   12 Steps   Type of Assistance Needed Incidental touching   Physical Assistance Level No physical assistance   Comment CGA, single HR and SPC.  HR on right   12 Steps CARE Score 4   Toilet Transfer   Comment Not needed during session   Therapeutic Interventions   Strengthening Standing LE TE   Neuromuscular Re-Education Elevator squats with 50% with 2 sec holds 5x2 reps.  50% and 75% with 1-2 second holds each 5x2.   Equipment Use   NuStep L4, 10 mins   Assessment   Treatment Assessment Pt demonstrates improving LE strength and endurance.  Performed stairs with single HR and SPC this date.  Difficulty with midrange knee control during elevator squats.  Continues to fatigue easily.  In need of ongoing interventions to maximize return to PLOF.   Problem List Decreased strength;Decreased endurance;Impaired balance;Decreased safety awareness;Decreased coordination;Impaired judgement   PT Barriers   Physical Impairment Decreased strength;Decreased range of motion;Decreased endurance;Impaired balance;Decreased mobility;Decreased coordination;Decreased safety awareness;Impaired vision   Functional Limitation Wheelchair management;Walking;Transfers;Standing;Stair negotiation;Ramp negotiation;Car transfers   Plan   Treatment/Interventions Functional transfer training;LE strengthening/ROM;Elevations;Therapeutic exercise;Endurance training;Patient/family training;Equipment eval/education;Bed mobility;Gait training   Progress Progressing toward goals   PT Therapy Minutes   PT Time In 0830   PT Time Out 0930   PT Total Time (minutes) 60   PT Mode of treatment - Individual (minutes) 60   PT Mode of treatment - Concurrent (minutes) 0   PT Mode of treatment - Group (minutes) 0   PT Mode of treatment - Co-treat (minutes) 0   PT Mode of Treatment - Total  time(minutes) 60 minutes   PT Cumulative Minutes 898     Patient presents to OT session from PT session.      Standing LE TE:  1x10, B/L LEs, 0#  March  Hip ABd  HR  TR - unilateral  HS Curls

## 2024-10-13 NOTE — PROGRESS NOTES
10/13/24 0940   Pain Assessment   Pain Assessment Tool 0-10   Pain Score No Pain   Restrictions/Precautions   Precautions Bed/chair alarms;Fall Risk;Supervision on toilet/commode;Visual deficit   Weight Bearing Restrictions No   ROM Restrictions No   Grooming   Able To Comb/Brush Hair;Wash/Dry Face   Shower/Bathe Self   Type of Assistance Needed Supervision   Shower/Bathe Self CARE Score 4   Bathing   Assessed Bath Style Shower   Anticipated D/C Bath Style Shower   Able to Adjust Water Temperature Yes   Able to Wash/Rinse/Dry (body part) Left Arm;Right Arm;L Upper Leg;R Upper Leg;L Lower Leg/Foot;R Lower Leg/Foot;Chest;Abdomen;Perineal Area;Buttocks   Limitations Noted in Balance;Coordination;Endurance;Safety;Strength   Positioning Seated;Standing   Adaptive Equipment Shower Bars;Shower Seat;Hand Held Shower   Tub/Shower Transfer   Limitations Noted In Balance;Coordination;Endurance;Safety;UE Strength;LE Strength   Adaptive Equipment Grab Bars;Seat with Back   Assessed Shower   Findings CGA   Upper Body Dressing   Type of Assistance Needed Set-up / clean-up   Upper Body Dressing CARE Score 5   Lower Body Dressing   Type of Assistance Needed Physical assistance   Physical Assistance Level 25% or less   Lower Body Dressing CARE Score 3   Putting On/Taking Off Footwear   Type of Assistance Needed Physical assistance   Physical Assistance Level 25% or less   Comment Pt doffed socks and shoes with Supervision; Pt donned L sock and shoe with increased time and Supervision; Pt utilized sock aide to don R sock with up to Min A with sock aide and donned R shoe with Supervision   Putting On/Taking Off Footwear CARE Score 3   Dressing/Undressing Clothing   Remove UB Clothes Pullover Shirt   Don UB Clothes Pullover Shirt   Remove LB Clothes Pants;Socks;Shoes   Don LB Clothes Pants;Socks;Shoes   Limitations Noted In Balance;Coordination;Endurance;Safety;Strength   Adaptive Equipment Sock Aide   Positioning Supported  Sit;Standing   Sit to Stand   Type of Assistance Needed Incidental touching   Comment Up to CGA; RW   Sit to Stand CARE Score 4   Exercise Tools   UE Ergometer Seated, forward/backwards, 3/3 minutes   Additional Activities   Additional Activities Other (Comment)   Additional Activities Comments Functional mobiltiy completed with RW with CGA within facility hallways, shower room and therapy area   Assessment   Treatment Assessment Pt receieved following PT session and agreeable to participation with OT session. Pt compelted shower and dressing ADL; Overall increased time with onset fatigue; Reviewed for use of AD sock aide to support increased independence; Functional transfers completed with overall up to CGA. Pt would benefit from continued participation with OT services to help support progress towards increased strenght, balance and endurance for use with self routines and functional transfers and to help facilitate safe discharge.   Plan   Treatment/Interventions ADL retraining;Functional transfer training;Therapeutic exercise;Endurance training;Patient/family training;Equipment eval/education;Compensatory technique education   Progress Progressing toward goals   OT Therapy Minutes   OT Time In 0940   OT Time Out 1040   OT Total Time (minutes) 60   OT Mode of treatment - Individual (minutes) 60

## 2024-10-14 LAB
ALBUMIN SERPL BCG-MCNC: 3.2 G/DL (ref 3.5–5)
ALP SERPL-CCNC: 43 U/L (ref 34–104)
ALT SERPL W P-5'-P-CCNC: 15 U/L (ref 7–52)
ANION GAP SERPL CALCULATED.3IONS-SCNC: 8 MMOL/L (ref 4–13)
AST SERPL W P-5'-P-CCNC: 10 U/L (ref 13–39)
BASOPHILS # BLD AUTO: 0.03 THOUSANDS/ΜL (ref 0–0.1)
BASOPHILS NFR BLD AUTO: 0 % (ref 0–1)
BILIRUB SERPL-MCNC: 0.69 MG/DL (ref 0.2–1)
BUN SERPL-MCNC: 18 MG/DL (ref 5–25)
CALCIUM ALBUM COR SERPL-MCNC: 9.7 MG/DL (ref 8.3–10.1)
CALCIUM SERPL-MCNC: 9.1 MG/DL (ref 8.4–10.2)
CHLORIDE SERPL-SCNC: 99 MMOL/L (ref 96–108)
CO2 SERPL-SCNC: 27 MMOL/L (ref 21–32)
CREAT SERPL-MCNC: 0.86 MG/DL (ref 0.6–1.3)
EOSINOPHIL # BLD AUTO: 0.02 THOUSAND/ΜL (ref 0–0.61)
EOSINOPHIL NFR BLD AUTO: 0 % (ref 0–6)
ERYTHROCYTE [DISTWIDTH] IN BLOOD BY AUTOMATED COUNT: 14.5 % (ref 11.6–15.1)
GFR SERPL CREATININE-BSD FRML MDRD: 83 ML/MIN/1.73SQ M
GLUCOSE P FAST SERPL-MCNC: 97 MG/DL (ref 65–99)
GLUCOSE SERPL-MCNC: 119 MG/DL (ref 65–140)
GLUCOSE SERPL-MCNC: 193 MG/DL (ref 65–140)
GLUCOSE SERPL-MCNC: 253 MG/DL (ref 65–140)
GLUCOSE SERPL-MCNC: 301 MG/DL (ref 65–140)
GLUCOSE SERPL-MCNC: 97 MG/DL (ref 65–140)
HCT VFR BLD AUTO: 31.2 % (ref 36.5–49.3)
HGB BLD-MCNC: 10.1 G/DL (ref 12–17)
IMM GRANULOCYTES # BLD AUTO: 0.14 THOUSAND/UL (ref 0–0.2)
IMM GRANULOCYTES NFR BLD AUTO: 2 % (ref 0–2)
LYMPHOCYTES # BLD AUTO: 0.97 THOUSANDS/ΜL (ref 0.6–4.47)
LYMPHOCYTES NFR BLD AUTO: 12 % (ref 14–44)
MCH RBC QN AUTO: 30.5 PG (ref 26.8–34.3)
MCHC RBC AUTO-ENTMCNC: 32.4 G/DL (ref 31.4–37.4)
MCV RBC AUTO: 94 FL (ref 82–98)
MONOCYTES # BLD AUTO: 0.53 THOUSAND/ΜL (ref 0.17–1.22)
MONOCYTES NFR BLD AUTO: 7 % (ref 4–12)
NEUTROPHILS # BLD AUTO: 6.25 THOUSANDS/ΜL (ref 1.85–7.62)
NEUTS SEG NFR BLD AUTO: 79 % (ref 43–75)
NRBC BLD AUTO-RTO: 0 /100 WBCS
PLATELET # BLD AUTO: 225 THOUSANDS/UL (ref 149–390)
PMV BLD AUTO: 10 FL (ref 8.9–12.7)
POTASSIUM SERPL-SCNC: 4.2 MMOL/L (ref 3.5–5.3)
PROT SERPL-MCNC: 6.4 G/DL (ref 6.4–8.4)
RBC # BLD AUTO: 3.31 MILLION/UL (ref 3.88–5.62)
SODIUM SERPL-SCNC: 134 MMOL/L (ref 135–147)
WBC # BLD AUTO: 7.94 THOUSAND/UL (ref 4.31–10.16)

## 2024-10-14 PROCEDURE — 99232 SBSQ HOSP IP/OBS MODERATE 35: CPT | Performed by: STUDENT IN AN ORGANIZED HEALTH CARE EDUCATION/TRAINING PROGRAM

## 2024-10-14 PROCEDURE — 82948 REAGENT STRIP/BLOOD GLUCOSE: CPT

## 2024-10-14 PROCEDURE — 97116 GAIT TRAINING THERAPY: CPT | Performed by: PHYSICAL THERAPIST

## 2024-10-14 PROCEDURE — 97112 NEUROMUSCULAR REEDUCATION: CPT | Performed by: PHYSICAL THERAPIST

## 2024-10-14 PROCEDURE — 97110 THERAPEUTIC EXERCISES: CPT | Performed by: PHYSICAL THERAPIST

## 2024-10-14 PROCEDURE — 85025 COMPLETE CBC W/AUTO DIFF WBC: CPT

## 2024-10-14 PROCEDURE — 97530 THERAPEUTIC ACTIVITIES: CPT | Performed by: PHYSICAL THERAPIST

## 2024-10-14 PROCEDURE — 80053 COMPREHEN METABOLIC PANEL: CPT

## 2024-10-14 PROCEDURE — 97530 THERAPEUTIC ACTIVITIES: CPT

## 2024-10-14 RX ADMIN — LEVALBUTEROL HYDROCHLORIDE 1.25 MG: 1.25 SOLUTION RESPIRATORY (INHALATION) at 14:21

## 2024-10-14 RX ADMIN — BUDESONIDE, GLYCOPYRROLATE, AND FORMOTEROL FUMARATE 2 PUFF: 160; 9; 4.8 AEROSOL, METERED RESPIRATORY (INHALATION) at 19:22

## 2024-10-14 RX ADMIN — FAMOTIDINE 20 MG: 20 TABLET, FILM COATED ORAL at 08:16

## 2024-10-14 RX ADMIN — ENOXAPARIN SODIUM 40 MG: 40 INJECTION SUBCUTANEOUS at 08:17

## 2024-10-14 RX ADMIN — GUAIFENESIN 600 MG: 600 TABLET ORAL at 08:15

## 2024-10-14 RX ADMIN — GLIPIZIDE 2.5 MG: 2.5 TABLET, EXTENDED RELEASE ORAL at 08:16

## 2024-10-14 RX ADMIN — SODIUM CHLORIDE SOLN NEBU 3% 4 ML: 3 NEBU SOLN at 14:21

## 2024-10-14 RX ADMIN — AMLODIPINE BESYLATE 5 MG: 5 TABLET ORAL at 08:16

## 2024-10-14 RX ADMIN — SODIUM CHLORIDE SOLN NEBU 3% 4 ML: 3 NEBU SOLN at 20:03

## 2024-10-14 RX ADMIN — GLIPIZIDE 2.5 MG: 2.5 TABLET, EXTENDED RELEASE ORAL at 17:11

## 2024-10-14 RX ADMIN — ASPIRIN 81 MG 81 MG: 81 TABLET ORAL at 08:16

## 2024-10-14 RX ADMIN — GUAIFENESIN 600 MG: 600 TABLET ORAL at 17:11

## 2024-10-14 RX ADMIN — INSULIN LISPRO 2 UNITS: 100 INJECTION, SOLUTION INTRAVENOUS; SUBCUTANEOUS at 21:13

## 2024-10-14 RX ADMIN — ATORVASTATIN CALCIUM 40 MG: 40 TABLET, FILM COATED ORAL at 17:11

## 2024-10-14 RX ADMIN — INSULIN LISPRO 6 UNITS: 100 INJECTION, SOLUTION INTRAVENOUS; SUBCUTANEOUS at 16:35

## 2024-10-14 RX ADMIN — BUDESONIDE, GLYCOPYRROLATE, AND FORMOTEROL FUMARATE 2 PUFF: 160; 9; 4.8 AEROSOL, METERED RESPIRATORY (INHALATION) at 06:04

## 2024-10-14 RX ADMIN — PREDNISONE 15 MG: 5 TABLET ORAL at 08:15

## 2024-10-14 RX ADMIN — SODIUM CHLORIDE SOLN NEBU 3% 4 ML: 3 NEBU SOLN at 08:57

## 2024-10-14 RX ADMIN — MONTELUKAST 10 MG: 10 TABLET, FILM COATED ORAL at 21:13

## 2024-10-14 RX ADMIN — INSULIN LISPRO 8 UNITS: 100 INJECTION, SOLUTION INTRAVENOUS; SUBCUTANEOUS at 11:49

## 2024-10-14 RX ADMIN — METFORMIN HYDROCHLORIDE 750 MG: 750 TABLET, EXTENDED RELEASE ORAL at 17:14

## 2024-10-14 RX ADMIN — LEVALBUTEROL HYDROCHLORIDE 1.25 MG: 1.25 SOLUTION RESPIRATORY (INHALATION) at 20:03

## 2024-10-14 RX ADMIN — FAMOTIDINE 20 MG: 20 TABLET, FILM COATED ORAL at 17:11

## 2024-10-14 RX ADMIN — LEVALBUTEROL HYDROCHLORIDE 1.25 MG: 1.25 SOLUTION RESPIRATORY (INHALATION) at 08:57

## 2024-10-14 RX ADMIN — CLOPIDOGREL 75 MG: 75 TABLET ORAL at 08:16

## 2024-10-14 RX ADMIN — LISINOPRIL 20 MG: 20 TABLET ORAL at 08:16

## 2024-10-14 RX ADMIN — ATENOLOL 25 MG: 25 TABLET ORAL at 08:16

## 2024-10-14 NOTE — PROGRESS NOTES
10/14/24 0926   Pain Assessment   Pain Assessment Tool 0-10   Pain Score No Pain   Restrictions/Precautions   Precautions Bed/chair alarms;Cognitive;Fall Risk;Supervision on toilet/commode   Tub/Shower Transfer   Findings dry run transfer during family training; pt performed at CG level.   Putting On/Taking Off Footwear   Type of Assistance Needed Supervision;Adaptive equipment   Putting On/Taking Off Footwear CARE Score 4   Dressing/Undressing Clothing   Remove LB Clothes Socks   Don LB Clothes Socks;Shoes   Limitations Noted In Balance;Endurance;Safety;Strength   Adaptive Equipment [place]   Positioning Supported Sit   Sit to Stand   Type of Assistance Needed Incidental touching  (CG RW)   Sit to Stand CARE Score 4   Bed-Chair Transfer   Type of Assistance Needed Incidental touching  (CG RW)   Chair/Bed-to-Chair Transfer CARE Score 4   Toileting Hygiene   Type of Assistance Needed Supervision   Toileting Hygiene CARE Score 4   Toileting   Able to Pull Clothing down yes, up yes.   Manage Hygiene Bladder   Limitations Noted In Balance;Safety;LE Strength;UE Strength   Adaptive Equipment Grab Bar   Toilet Transfer   Type of Assistance Needed Incidental touching;Verbal cues  (CG)   Comment VCs to keep walker with body at all times   Toilet Transfer CARE Score 4   Toilet Transfer   Surface Assessed Raised Toilet   Transfer Technique Standard   Limitations Noted In Balance;Endurance;Safety;UE Strength;LE Strength;Sequencing   Adaptive Equipment Grab Bar;Walker   Findings Cues for sequencing of trasnfer and to keep RW with body when turning to sit   Exercise Tools   Other Exercise Tool 1 cone stacking activity completed with BUE w/c level to address sitting balance. cones placed on either side of pt who laterally leaned to obtain cone and forward leaned to place onto surface.   Cognition   Overall Cognitive Status WFL   Arousal/Participation Alert;Responsive;Cooperative   Attention Attends with cues to redirect    Orientation Level Oriented X4   Memory Decreased recall of precautions   Following Commands Follows one step commands without difficulty   Assessment   Treatment Assessment Pt agreeable to OT session this AM and was recieved seated in recliner with wife present. Pt completed toileting, fxl mobility/transfers with RW and w/c, seated balance activity, and education with wife on home modifications. Wife expressed concerns with pt sitting EOB or in chair and slipping off when completing LB dressing/footwear. OTS explained to pt the importance of using AE when he can to decrease LOB when seated and conserve energy. Wife also expressed concerns of bathroom/shower set up with pt and OTS doing a dry run of a shower transfer. VCs used throughout transfers for overall safety and proper RW use. Education and recommendations made for pt and wife to increase safety and mobility in bathroom. Barriers include decreased strength and endurance on R side s/p CVA, impaired balance and decreased safety awareness/judgement. Pt will benefit from continued skilled OT services to address barriers and increase independence with daily tasks.   OT Family training done with: wife   Assessment of family training Education with pt and wife on home modifications specifically for bathroom and shower. shower transfer demonstrated for wife to see pt mobility in/out of shower and how much assistance he will need. OTS explained to wife that pt needs the walker with his body at all times and to feel for the chair behind him before sitting down. wife stating they are installing a new shower head and already have a shower chair with handles. Wife mentioned needing a seat for him to sit on once out of shower with recommendations made for using a standard chair or work bench. Suggestions made by OTS to add non slip stickers to shower floor and getting a non slip mat for outside of the shower.   Prognosis Good   Problem List Decreased strength;Decreased  endurance;Impaired balance;Decreased safety awareness;Impaired judgement   Plan   Treatment/Interventions ADL retraining;Functional transfer training;LE strengthening/ROM;Therapeutic exercise;Endurance training;Patient/family training;Equipment eval/education;Compensatory technique education;OT   Progress Progressing toward goals   OT Therapy Minutes   OT Time In 0926   OT Time Out 1000   OT Total Time (minutes) 34

## 2024-10-14 NOTE — PROGRESS NOTES
10/14/24 1330   Pain Assessment   Pain Assessment Tool 0-10   Pain Score 4   Pain Location/Orientation Location: Buttocks   Restrictions/Precautions   Precautions Bed/chair alarms;Cognitive;Fall Risk;Supervision on toilet/commode   Cognition   Overall Cognitive Status WFL   Subjective   Subjective I'm really tired now.   Roll Left and Right   Type of Assistance Needed Independent   Physical Assistance Level No physical assistance   Roll Left and Right CARE Score 6   Sit to Lying   Type of Assistance Needed Independent   Physical Assistance Level No physical assistance   Sit to Lying CARE Score 6   Lying to Sitting on Side of Bed   Comment Pt OOB   Sit to Stand   Type of Assistance Needed Incidental touching   Physical Assistance Level 25% or less   Comment CGA/min A, RW   Sit to Stand CARE Score 3   Bed-Chair Transfer   Type of Assistance Needed Incidental touching   Physical Assistance Level 25% or less   Comment CGA/min A, rw   Chair/Bed-to-Chair Transfer CARE Score 3   Car Transfer   Type of Assistance Needed Physical assistance   Physical Assistance Level 25% or less   Comment min A, SUV transfer (wife's car), cues for technique   Car Transfer CARE Score 3   Walk 10 Feet   Type of Assistance Needed Incidental touching   Physical Assistance Level No physical assistance   Comment CGA, RW   Walk 10 Feet CARE Score 4   Walk 50 Feet with Two Turns   Type of Assistance Needed Incidental touching   Physical Assistance Level No physical assistance   Comment CGA, RW   Walk 50 Feet with Two Turns CARE Score 4   Walk 150 Feet   Type of Assistance Needed Incidental touching   Physical Assistance Level No physical assistance   Comment CGA, RW   Walk 150 Feet CARE Score 4   Walking 10 Feet on Uneven Surfaces   Type of Assistance Needed Physical assistance   Physical Assistance Level 25% or less   Comment min A, RW, outdoor surfaces, difficulty with maintaining balance on incline outdoors during to/from car transfer.   Requires physical assist to attain and maintain vehicle while standing/ambulating on uneven parking lot surface (paved with slight incline)   Walking 10 Feet on Uneven Surfaces CARE Score 3   Ambulation   Primary Mode of Locomotion Prior to Admission Walk   Distance Walked (feet) 221 ft   Assist Device Roller Walker   Does the patient walk? 2. Yes   Wheel 50 Feet with Two Turns   Type of Assistance Needed Independent   Physical Assistance Level No physical assistance   Wheel 50 Feet with Two Turns CARE Score 6   Wheel 150 Feet   Type of Assistance Needed Independent   Physical Assistance Level No physical assistance   Wheel 150 Feet CARE Score 6   Wheelchair mobility   Does the patient use a wheelchair? 1. Yes   Type of Wheelchair Used 1. Manual   Curb or Single Stair   Reason if not Attempted Safety concerns   1 Step (Curb) CARE Score 88   4 Steps   Reason if not Attempted Safety concerns   4 Steps CARE Score 88   12 Steps   Reason if not Attempted Safety concerns   12 Steps CARE Score 88   Picking Up Object   Comment DNT   Toilet Transfer   Comment Not needed   Therapeutic Interventions   Strengthening HEP provided to wife for her review of present seated, supine, standing TE program that patient is completing with PT sessions   Equipment Use   NuStep L4, 15 mins   Assessment   Treatment Assessment Pt presents agreeable to session, does voice that he is very tired this afternoon.  Continues to make progress with ambulation endurance, remains limited with balance/balance recovery from LOB and with divided attention tasks.  In need of progression of balance tasks in upcoming sessions.  Fatigue prohibits performing this afternoon.  Cont per POC.   Family/Caregiver Present Spouse present for car transfer, HEP provided for review and questions.   Problem List Decreased strength;Decreased endurance;Impaired balance;Decreased safety awareness;Impaired judgement   PT Barriers   Physical Impairment Decreased  strength;Decreased range of motion;Decreased endurance;Impaired balance;Decreased mobility;Decreased coordination;Decreased safety awareness;Impaired vision   Functional Limitation Wheelchair management;Walking;Transfers;Standing;Stair negotiation;Ramp negotiation;Car transfers   Plan   Treatment/Interventions Functional transfer training;LE strengthening/ROM;Elevations;Therapeutic exercise;Endurance training;Patient/family training;Equipment eval/education;Bed mobility;Gait training   Progress Progressing toward goals   PT Therapy Minutes   PT Time In 1330   PT Time Out 1430   PT Total Time (minutes) 60   PT Mode of treatment - Individual (minutes) 60   PT Mode of treatment - Concurrent (minutes) 0   PT Mode of treatment - Group (minutes) 0   PT Mode of treatment - Co-treat (minutes) 0   PT Mode of Treatment - Total time(minutes) 60 minutes   PT Cumulative Minutes 1018     Pt returned to supine for pressure relief ofbuttocks.  All needs in reach, lines intact, alarms in place and activated.  Cont per POC.

## 2024-10-14 NOTE — PROGRESS NOTES
Progress Note - Gold Van 78 y.o. male MRN: [id number]    Unit/Bed#: [facility] 216-01 Encounter: [id number]        Subjective:   Patient seen and examined at bedside after reviewing the chart and discussing the case with the caring staff.      Patient examined at bedside.  Patient denies any acute symptoms today.    On review of patient's labs from 10/14/2024.  Patient's CMP showed sodium 134.  Patient CBC showed hemoglobin 10.1 with hematocrit 31.2.    Physical Exam   Vitals: Blood pressure 118/59, pulse 101, temperature (!) 97.3 °F (36.3 °C), temperature source Temporal, resp. rate 16, height 6' (1.829 m), weight 70.6 kg (155 lb 10.3 oz), SpO2 99%.,Body mass index is 21.11 kg/m².  Constitutional: Patient in no acute distress.  HEENT: PERR, EOMI, MMM.  Cardiovascular: Normal rate and regular rhythm.    Pulmonary/Chest: Effort normal and breath sounds normal.   Abdomen: Soft, + BS, NT.    Assessment/Plan:  Gold Van is a(n) 78 y.o. male with CVA.     Hypertension.  Patient is on lisinopril 20 mg twice daily, amlodipine 5 mg twice daily, atenolol 25 mg daily.  Type 2 diabetes mellitus.  Hgb A1c 10.6% on 10/2/24.  Restart home glipizide 2.5 mg twice daily 10/4 and and discontinue Lantus.  Wife brought in metformin  mg daily.  SSI with accu-checks ac/hs.  Carb controlled diet.  GERD.  Patient is on famotidine 20 mg twice daily.   Chronic generalized pruritus.  Ongoing past 5-6 years.  Pt/wife reports receiving extensive workup in the past from various specialists including dermatology and allergist.  Continue alternating prednisone 5 mg and 15 mg daily.    COPD/asthma.  Rhiannoni brought from home.  Continue Singulair 10 mg nightly and Xopenox/sodium chloride neb TID.  [facility]    Pneumonia.  Sputum C&S on 10/1 positive for pseudomonas.  Patient was treated with IV cefepime and then transitioned to PO Vantin to complete 5-day course of antibiotics as recommended by pulmonology.   Continue Vantin 400 mg twice daily x 4 more doses (thru 10/6), Mucinex 600 mg twice daily, Xopenox/sodium chloride neb TID.  Tessalon perles as needed.  Incentive spirometry.   Abnormal echocardiogram.  TTE on 10/1 with posterior wall motion abnormality.  Cardiology saw patient on acute care and recommended outpatient stress test.  Hyponatremia.  Level 134 -> 133 on 10/5/24.  Cont to monitor.    Malnutrition.  Adult Malnutrition type: Chronic illness.  Adult Degree of Malnutrition: Malnutrition of moderate degree. Malnutrition Characteristics: Weight loss, Inadequate energy, Fat loss.  Dietician following.  Pain and bowel regimen.  As per physiatry.  Patient started on Senokot S daily as needed and MiraLAX as needed 10/5/2024.  CVA.   Neurology recommended DAPT with Plavix/ASA x90 days total (~12/29/24) then ASA monotherapy.  Cont atorvastatin 40 mg daily.  Follow-up with neurology in 6 weeks.  Follow-up with cardiology for Zio patch and/or loop recorder on discharge.  Patient is receiving intensive PT OT with management as per physiatry.     Anticipated date of discharge: Thursday 10/17/2024

## 2024-10-14 NOTE — PROGRESS NOTES
Progress Note - PMR   Name: Gold Van 78 y.o. male I MRN: [id number]  Unit/Bed#: -01 I Date of Admission: 10/4/2024   Date of Service: 10/14/2024 I Hospital Day: 10     Assessment & Plan  CVA (cerebrovascular accident) (Formerly [facility])  MRI revealed multiple foci of acute infarcts in the bilateral frontoparietal cortices and subcortical white matter left > right  Placed on DAPT and statin for secondary stroke prophylaxis per neurology and follow up with Neuro in 6 weeks (DAPT for 3 months)  Etiology thought to be coagulopathy secondary to COVID infection  CT CAP completed to rule out malignancy  TTE showing 50% EF but posterior wall hypokinesis and will need cardiology follow up  Patient endorses improvement in right sided weakness. Has ongoing dysmetria and incoordination in the right nahomi body  Physical, Occupational, and Speech therapy      Type 2 diabetes mellitus with complication, without long-term current use of insulin (Formerly [facility])  Lab Results   Component Value Date    HGBA1C 10.6 (H) 10/02/2024       Recent Labs     10/13/24  1618 10/13/24  2036 10/14/24  0713 10/14/24  1134   POCGLU 296* 209* 119 301*     Uncontrolled type 2 diabetes with A1C most recently of 10.6  Restarted home glipizide per IM and stopping Lantus  Wife tbrought in Home metformin XL 750mg pills  Will need to reach out to IM about elevated glucose levels  SSI and QID glucose checks  CCD    Essential hypertension  Atenolol 25 mg daily as well as amlodipine 5 mg twice daily and lisinopril 20 mg twice daily  Management per internal medicine and monitor blood pressures and therapy and on routine vitals  Hyperlipidemia  Continue statin  Sepsis due to pneumonia (Formerly [facility])  Met sepsis criteria on admission with tahcycardia, leukocytosis. Lactic acidosis. Also with acute repsiratory failure requiring CPAP-> BiPAP and eventually nasal canula  Blood cultures felt to be contaminants  CXR with left base opacity atelectasis vs pneumonia  S/P IV Ceftriaxone  and azithromycin for 4 days  Sputum C&S was positive for Pseudomonas-received cefepime/Azithromycin while inpatient, transitioning to Vantin for total of 2 more days (5 days course of treatment as recommended by pulmonology)   Pulm followed in acute care  IV steroids weaned and now on prior chronic prednisone regimen  Hyponatremia  Patient presents with hyponatremia sodium of 134 as of 10/3  Continue to monitor on biweekly labs or sooner if clinically indicated  Pruritus  Multiple years history of generalized pruritus with extensive work up in past  On alternating prednisone doses  Monitor for any changes  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HCC)  Suspected COPD exacerbation in setting of COVID infection  Initially requiring CPAP, transitioned to BiPAP, successfully weaned to room air  Appreciate input of pulmonology who followed  Received IV Solu-Medrol, transitioned to oral prednisone on 10/2  Continue robust regimen of meds for baseline COPD    COVID-19  Presented with shortness of breath and was in the hospital 9/24/24 for acute respiratory symptoms related to infection  Received remdesivir and baricitinib  Felt that strokes related COVID coagulopathy  Abnormal echocardiogram  Had TTE with posterior wall abnormalities/mild hypokinesis noted. EF 50%  Follow up with cardiology as an outpatient, would benefit from monitor and stress test  Gastroesophageal reflux disease without esophagitis  Continue pepcid  Impaired mobility and activities of daily living  Patient was evaluated by the rehabilitation team MD and an appropriate candidate for acute inpatient rehabilitation program at this time.  The patient will tolerate 3 hours/day 5 to 7 days/week of intensive physical, occupational and speech therapy in order to obtain goals for community discharge  Due to the patient's functional Compared to their baseline level of function in addition to their ongoing medical needs, the patient would benefit from daily supervision from a  rehabilitation physician as well as rehabilitation nursing to implement and adjust the medical as well as functional plan of care in order to meet the patient's goals.    Neuropathy  Neuropathy in feet and lower 1/3 of the tibia bilaterally  Undiagnosed previously, but to consider in therapies  Likely related to diabetes  Consider gabapentin, however not uncomfortable at this time  Foot drop, left  Patient states he has had issues with the foot for some time now  Presents with 1/5 ankle dorsiflexor strength on the left  Eval for bracing  Moderate protein-calorie malnutrition (HCC)  Malnutrition Findings:   Adult Malnutrition type: Chronic illness  Adult Degree of Malnutrition: Malnutrition of moderate degree  Malnutrition Characteristics: Weight loss, Inadequate energy, Fat loss                  360 Statement: Malnutrition related to inadequate energy intake as evidenced by 15#(9%) weight loss from 8/7/# to 10/4/#, subcutaneous fat loss orbital/cheek region and extremities, <75% energy intake compared to estimated needs >1 month.    BMI Findings:           Body mass index is 21.11 kg/m².       Subjective   Gold Van is a 78 y.o. male with history of hypertension, type 2 diabetes, COPD, chronic steroid use who presented to the [facility] initially on 9/29/2024 for shortness of breath. Patient with a history of COPD and asthma and recent COVID infection with hospitalization. He arrived via ambulance with a CPAP on and then required a transition to BiPAP in the ER. He was initiated on IV cefepime for suspected pneumonia and also IV steroids. He met sepsis criteria for the tachycardia and leukocytosis in the setting of dental pneumonia. He had some difficulty with coordination of his right leg during a PT session as of 10/1 and neurology evaluated the patient and recommended stroke. An MRI of the brain did note multiple acute infarcts in the bilateral frontoparietal cortices  and subcortical white matter changes without evidence of mass effect or hemorrhagic transformation. Patient was started on dual antiplatelet therapy and statin and additionally had a CT of the chest abdomen pelvis to rule out other causes of hypercoagulability, stroke, malignancy. Hypercoagulability it was thought to be related to his prior COVID infection. He was on telemetry without any issues and no atrial fibrillation noted. He did have a TTE that noted some wall motion abnormality in the posterior sections. Plan for outpatient monitor and stress test. Pulmonology also following the patient and he continued with cefepime and azithromycin and is discharging on 2 additional days for coverage of Pseudomonas. This was grown in the sputum and was recommended by pulmonology. The patient was evaluated by the Rehabilitation team and deemed an appropriate candidate for comprehensive inpatient rehabilitation and admitted to the [facility] on 10/4/2024 1:24 PM     Chief Complaint: f/u ambulatory dysfunction and discharge planning and medication questions    Interval: Patient seen and evaluated in room.  No acute events over the weekend or last evening.  Participating well with therapy and family specifically the wife is present on doing ongoing family training for anticipated discharge this Thursday.  She had several questions 1 of which was the antiplatelet medications and blood thinners as well as the injectable medication which she will not need to be going home on.  Do confirm that dual antiplatelet therapy is for 90 days but will need to follow-up with neurology as an outpatient.  Also questions about blood glucose levels given the recently high readings except for the a.m. blood sugar which we will need to reach out to internal medicine about.  Additionally questions about the inhaled medications as she is stating that there is a nebulizer that is not albuterol at home beyond his home [facility] and if he will need to be going  home on the Xopenex/sodium chloride nebulizers.  All questions answered.  No fever, chills, nausea, vomiting with cough or shortness of breath, diarrhea constipation.  Does have occasional chest tightness with breathing but does not happen every day and is usually well treated with the current inhaler regiment.  He is feeling fine today    Objective :  Temp:  [97.3 °F (36.3 °C)-98.3 °F (36.8 °C)] 97.3 °F (36.3 °C)  HR:  [] 101  BP: ()/(54-59) 118/59  Resp:  [16-18] 16  SpO2:  [96 %-99 %] 99 %  O2 Device: None (Room air)    Functional Update:  Physical Therapy Occupational Therapy Speech Therapy   Weight Bearing Status: Weight Bearing as Tolerated  Transfers: Incidental Touching, Supervision  Bed Mobility: Supervision, Independent  Amulation Distance (ft): 190 feet  Ambulation: Minimal Assistance  Assistive Device for Ambulation: Single Point Cane  Wheelchair Mobility Distance:  (N/A)  Wheelchair Mobility:  (N/A)  Number of Stairs: 14  Assistive Device for Stairs: Right Hand Rail (single HR + SPC)  Stair Assistance: Minimal Assistance (CGA/Virgie)  Ramp: Moderate Assistance  Assistive Device for Ramp: Roller Walker  Discharge Recommendations: Home with:  DC Home with:: Family Support, Outpatient Physical Therapy   Eating: Independent  Grooming: Supervision  Bathing: Minimal Assistance  Bathing: Minimal Assistance  Upper Body Dressing: Supervision  Lower Body Dressing: Minimal Assistance  Toileting: Incidental Touching  Tub/Shower Transfer: Minimal Assistance  Toilet Transfer: Incidental Touching  Cognition: Within Defined Limits  Orientation: Person, Place, Time, Situation               Physical Exam  Vitals reviewed.   Constitutional:       General: He is not in acute distress.  HENT:      Head: Normocephalic and atraumatic.      Right Ear: External ear normal.      Left Ear: External ear normal.      Nose: Nose normal. No rhinorrhea.      Mouth/Throat:      Mouth: Mucous membranes are moist.      Pharynx:  Oropharynx is clear.   Eyes:      General: No scleral icterus.  Cardiovascular:      Rate and Rhythm: Normal rate.      Pulses: Normal pulses.   Pulmonary:      Effort: Pulmonary effort is normal. No respiratory distress.   Abdominal:      General: There is no distension.      Palpations: Abdomen is soft.   Musculoskeletal:      Right lower leg: Edema present.      Left lower leg: Edema present.      Comments: Trace edema at the ankle level   Skin:     General: Skin is warm and dry.      Comments: Healing area on the left anterior shin with Band-Aid in place with no active bleeding.  There is diffuse ecchymosis and stages of healing throughout the limbs   Neurological:      Mental Status: He is alert and oriented to person, place, and time.      Comments: Dysmetria in the right hemibody and left side with sensory deficit in the bilateral lower extremities   Psychiatric:         Mood and Affect: Mood normal.         Behavior: Behavior normal.           Scheduled Meds:  Current Facility-Administered Medications   Medication Dose Route Frequency Provider Last Rate    acetaminophen  650 mg Oral Q6H PRN Annie L Dagnall, PA-C      albuterol  2 puff Inhalation Q4H PRN Annie L Dagnall, PA-C      albuterol  2.5 mg Nebulization Q4H PRN Annie L Dagnall, PA-C      amLODIPine  5 mg Oral BID Annie L Dagnall, PA-C      aspirin  81 mg Oral Daily Annie L Dagnall, PA-C      atenolol  25 mg Oral Daily Annie L Dagnall, PA-C      atorvastatin  40 mg Oral QPM Annie L Dagnall, PA-C      benzonatate  100 mg Oral TID PRN Annie L Dagnall, PA-C      Budeson-Glycopyrrol-Formoterol  2 puff Inhalation Q12H Annie L Dagnall, PA-C      clopidogrel  75 mg Oral Daily Annie L Dagnall, PA-C      enoxaparin  40 mg Subcutaneous Daily Annie L Dagnall, PA-C      famotidine  20 mg Oral BID Annie L Dagnall, PA-C      glipiZIDE  2.5 mg Oral BID With Meals Annie L Dagnall, PA-C      guaiFENesin  600 mg Oral BID Annie L Dagnall, PA-C       insulin lispro  1-6 Units Subcutaneous HS Annie L Dagnall, PA-C      insulin lispro  2-12 Units Subcutaneous TID AC Annie L Dagnall, PA-C      levalbuterol  1.25 mg Nebulization TID Annie L Dagnall, PA-C      lisinopril  20 mg Oral BID Annie L Dagnall, PA-C      LORazepam  0.5 mg Oral HS PRN Annie L Felecial, PA-C      metFORMIN  750 mg Oral Daily With Dinner Christian Hendrickson MD      montelukast  10 mg Oral HS Annie L Dagnall, PA-C      polyethylene glycol  17 g Oral Daily PRN Christian Hendrickson MD      predniSONE  15 mg Oral Every Other Day Annie L Dagnall, PA-C      predniSONE  5 mg Oral Every Other Day Annie L Dagnall, PA-C      senna-docusate sodium  1 tablet Oral HS PRN Christian Hendrickson MD      sodium chloride  4 mL Nebulization TID Annie L Conrad, PA-C           Lab Results: I have reviewed the following results:  Results from last 7 days   Lab Units 10/14/24  0537   HEMOGLOBIN g/dL 10.1*   HEMATOCRIT % 31.2*   WBC Thousand/uL 7.94   PLATELETS Thousands/uL 225     Results from last 7 days   Lab Units 10/14/24  0537   BUN mg/dL 18   SODIUM mmol/L 134*   POTASSIUM mmol/L 4.2   CHLORIDE mmol/L 99   CREATININE mg/dL 0.86   AST U/L 10*   ALT U/L 15                Brandon Munoz, DO  Physical Medicine and Rehabilitation  [facility]    I have spent a total time of 35 minutes in caring for this patient on the day of the visit/encounter including Instructions for management, Patient and family education, Counseling / Coordination of care, Documenting in the medical record, Reviewing / ordering tests, medicine, procedures  , and Communicating with other healthcare professionals .

## 2024-10-14 NOTE — CASE MANAGEMENT
MELISSA requested to speak with patient's wife Stacia. Stacia inquiring about discharge plan, stating she would prefer home care at first then transition to outpt PT/OT. Stacia requesting patient to stay longer, CM made her aware the last message received from insurance, stated the expectation that patient would be discharging Monday 10/14. CM made Stacia aware CM will contact the insurance and make them aware of the planned discharge date of 10/17, hoping for the insurance to okay that d/c date. Stacia requesting CM to call and arrange for follow up Neurology appt closer to home , if possible.  MELISSA called [facility] Neurology, spoke with Joanne, who stated the physician does not come up to the [place] area, scheduled and appt with  for 1/28/24 at 10:00. Joanne stated the office will put the patient on a cancellation wait list, and try to get him in sooner for the appt. This information placed on discharge isntructions.  MELISSA sent home care referral to KEANU for RN/PT/OT through [employer] via [employer], awaiting response.  Clinical review update sent to [facility]  via [employer], awaiting determination.

## 2024-10-14 NOTE — PLAN OF CARE
Problem: Nutrition  Goal: Nutrition/Hydration status is improving  Description: Monitor and assess patient's nutrition/hydration status for malnutrition (ex- brittle hair, bruises, dry skin, pale skin and conjunctiva, muscle wasting, smooth red tongue, and disorientation). Collaborate with interdisciplinary team and initiate plan and interventions as ordered.  Monitor patient's weight and dietary intake as ordered or per policy. Utilize nutrition screening tool and intervene per policy. Determine patient's food preferences and provide high-protein, high-caloric foods as appropriate.     - Assist patient with eating.  - Allow adequate time for meals.  - Encourage patient to take dietary supplement as ordered.  - Collaborate with clinical nutritionist.  - Include patient/family/caregiver in decisions related to nutrition.  Outcome: Progressing     Problem: INFECTION - ADULT  Goal: Absence or prevention of progression during hospitalization  Description: INTERVENTIONS:  - Assess and monitor for signs and symptoms of infection  - Monitor lab/diagnostic results  - Monitor all insertion sites, i.e. indwelling lines, tubes, and drains  - Monitor endotracheal if appropriate and nasal secretions for changes in amount and color  - [place] appropriate cooling/warming therapies per order  - Administer medications as ordered  - Instruct and encourage patient and family to use good hand hygiene technique  - Identify and instruct in appropriate isolation precautions for identified infection/condition  Outcome: Progressing  Goal: Absence of fever/infection during neutropenic period  Description: INTERVENTIONS:  - Monitor WBC    Outcome: Progressing      Declined

## 2024-10-14 NOTE — ASSESSMENT & PLAN NOTE
Lab Results   Component Value Date    HGBA1C 10.6 (H) 10/02/2024       Recent Labs     10/13/24  1618 10/13/24  2036 10/14/24  0713 10/14/24  1134   POCGLU 296* 209* 119 301*     Uncontrolled type 2 diabetes with A1C most recently of 10.6  Restarted home glipizide per IM and stopping Lantus  Wife tbrought in Home metformin XL 750mg pills  Will need to reach out to IM about elevated glucose levels  SSI and QID glucose checks  CCD

## 2024-10-14 NOTE — PLAN OF CARE
Problem: Neurological Deficit  Goal: Neurological status is stable or improving  Description: Interventions:  - Monitor and assess patient's level of consciousness, motor function, sensory function, and level of assistance needed for ADLs.   - Monitor and report changes from baseline. Collaborate with interdisciplinary team to initiate plan and implement interventions as ordered.   - Provide and maintain a safe environment.  - Consider seizure precautions.  - Consider fall precautions.  - Consider aspiration precautions.  - Consider bleeding precautions.  Outcome: Progressing     Problem: Communication Impairment  Goal: Ability to express needs and understand communication  Description: Assess patient's communication skills and ability to understand information.  Patient will demonstrate use of effective communication techniques, alternative methods of communication and understanding even if not able to speak.     - Encourage communication and provide alternate methods of communication as needed.  - Collaborate with case management/ for discharge needs.  - Include patient/family/caregiver in decisions related to communication.  Outcome: Progressing     Problem: PAIN - ADULT  Goal: Verbalizes/displays adequate comfort level or baseline comfort level  Description: Interventions:  - Encourage patient to monitor pain and request assistance  - Assess pain using appropriate pain scale  - Administer analgesics based on type and severity of pain and evaluate response  - Implement non-pharmacological measures as appropriate and evaluate response  - Consider cultural and social influences on pain and pain management  - Notify physician/advanced practitioner if interventions unsuccessful or patient reports new pain  Outcome: Progressing

## 2024-10-14 NOTE — PROGRESS NOTES
10/14/24 1300   Pain Assessment   Pain Assessment Tool 0-10   Pain Score No Pain   Restrictions/Precautions   Precautions Bed/chair alarms;Cognitive;Fall Risk;Supervision on toilet/commode   Sit to Stand   Type of Assistance Needed Incidental touching  (CG)   Sit to Stand CARE Score 4   Bed-Chair Transfer   Type of Assistance Needed Incidental touching;Verbal cues  (CG)   Comment RW, VCs for safe body and RW placement when transfering onto chair   Chair/Bed-to-Chair Transfer CARE Score 4   Transfer Bed/Chair/Wheelchair   Limitations Noted In Balance;Endurance;UE Strength;LE Strength   Adaptive Equipment Roller Walker   Exercise Tools   Other Exercise Tool 1 BUE seated fxl reacher activity with push pegs to address fine motor skills, in hand manipulation and visual scanning. difficulty noted during activity with visual scanning to L side.   Cognition   Overall Cognitive Status WFL   Arousal/Participation Alert;Responsive;Cooperative   Attention Attends with cues to redirect   Orientation Level Oriented X4   Memory Decreased recall of precautions   Following Commands Follows one step commands without difficulty   Assessment   Treatment Assessment Pt agreeable to brief OT session and recieved seated in recliner with wife present for entire session. Pt participated in fxl transfers/mobility and BUE theract. Occasional VCs to maintain safety during transfers. Barriers are the same as listed in earlier note. Pt will benefit from continued OT services to address barriers and increase independence with daily tasks.   Prognosis Good   Problem List Decreased strength;Decreased endurance;Impaired balance;Decreased safety awareness;Impaired judgement   Plan   Treatment/Interventions ADL retraining;Functional transfer training;LE strengthening/ROM;Therapeutic exercise;Endurance training;Patient/family training;Equipment eval/education;Compensatory technique education;OT   Progress Progressing toward goals   OT Therapy Minutes    OT Time In 1300   OT Time Out 1330   OT Total Time (minutes) 30   Therapy Time missed   Time missed? No     Pt with 30 concurrent minutes, not individual as accidentally listed.

## 2024-10-14 NOTE — NURSING NOTE
Patient supervision with transfers in room with walker. Reports no pain. Continued stroke education provided. Sitting upright in recliner chair for all meals. Will continue to monitor and follow plan of care.

## 2024-10-15 LAB
GLUCOSE SERPL-MCNC: 138 MG/DL (ref 65–140)
GLUCOSE SERPL-MCNC: 182 MG/DL (ref 65–140)
GLUCOSE SERPL-MCNC: 277 MG/DL (ref 65–140)
GLUCOSE SERPL-MCNC: 92 MG/DL (ref 65–140)

## 2024-10-15 PROCEDURE — 97110 THERAPEUTIC EXERCISES: CPT

## 2024-10-15 PROCEDURE — 92507 TX SP LANG VOICE COMM INDIV: CPT | Performed by: NURSE PRACTITIONER

## 2024-10-15 PROCEDURE — 99233 SBSQ HOSP IP/OBS HIGH 50: CPT | Performed by: STUDENT IN AN ORGANIZED HEALTH CARE EDUCATION/TRAINING PROGRAM

## 2024-10-15 PROCEDURE — 97535 SELF CARE MNGMENT TRAINING: CPT

## 2024-10-15 PROCEDURE — 82948 REAGENT STRIP/BLOOD GLUCOSE: CPT

## 2024-10-15 PROCEDURE — 97530 THERAPEUTIC ACTIVITIES: CPT

## 2024-10-15 PROCEDURE — 97116 GAIT TRAINING THERAPY: CPT

## 2024-10-15 RX ORDER — CLOPIDOGREL BISULFATE 75 MG/1
75 TABLET ORAL DAILY
Qty: 75 TABLET | Refills: 0 | Status: SHIPPED | OUTPATIENT
Start: 2024-10-18 | End: 2025-01-01

## 2024-10-15 RX ADMIN — ASPIRIN 81 MG 81 MG: 81 TABLET ORAL at 08:08

## 2024-10-15 RX ADMIN — LEVALBUTEROL HYDROCHLORIDE 1.25 MG: 1.25 SOLUTION RESPIRATORY (INHALATION) at 20:14

## 2024-10-15 RX ADMIN — BUDESONIDE, GLYCOPYRROLATE, AND FORMOTEROL FUMARATE 2 PUFF: 160; 9; 4.8 AEROSOL, METERED RESPIRATORY (INHALATION) at 19:17

## 2024-10-15 RX ADMIN — GLIPIZIDE 2.5 MG: 2.5 TABLET, EXTENDED RELEASE ORAL at 17:00

## 2024-10-15 RX ADMIN — GLIPIZIDE 2.5 MG: 2.5 TABLET, EXTENDED RELEASE ORAL at 08:08

## 2024-10-15 RX ADMIN — AMLODIPINE BESYLATE 5 MG: 5 TABLET ORAL at 20:49

## 2024-10-15 RX ADMIN — LEVALBUTEROL HYDROCHLORIDE 1.25 MG: 1.25 SOLUTION RESPIRATORY (INHALATION) at 08:09

## 2024-10-15 RX ADMIN — LISINOPRIL 20 MG: 20 TABLET ORAL at 20:49

## 2024-10-15 RX ADMIN — BUDESONIDE, GLYCOPYRROLATE, AND FORMOTEROL FUMARATE 2 PUFF: 160; 9; 4.8 AEROSOL, METERED RESPIRATORY (INHALATION) at 07:11

## 2024-10-15 RX ADMIN — LISINOPRIL 20 MG: 20 TABLET ORAL at 08:08

## 2024-10-15 RX ADMIN — MONTELUKAST 10 MG: 10 TABLET, FILM COATED ORAL at 21:02

## 2024-10-15 RX ADMIN — CLOPIDOGREL 75 MG: 75 TABLET ORAL at 08:08

## 2024-10-15 RX ADMIN — LEVALBUTEROL HYDROCHLORIDE 1.25 MG: 1.25 SOLUTION RESPIRATORY (INHALATION) at 14:24

## 2024-10-15 RX ADMIN — SODIUM CHLORIDE SOLN NEBU 3% 4 ML: 3 NEBU SOLN at 20:14

## 2024-10-15 RX ADMIN — ATORVASTATIN CALCIUM 40 MG: 40 TABLET, FILM COATED ORAL at 17:01

## 2024-10-15 RX ADMIN — FAMOTIDINE 20 MG: 20 TABLET, FILM COATED ORAL at 17:01

## 2024-10-15 RX ADMIN — ATENOLOL 25 MG: 25 TABLET ORAL at 08:09

## 2024-10-15 RX ADMIN — SODIUM CHLORIDE SOLN NEBU 3% 4 ML: 3 NEBU SOLN at 08:09

## 2024-10-15 RX ADMIN — FAMOTIDINE 20 MG: 20 TABLET, FILM COATED ORAL at 08:08

## 2024-10-15 RX ADMIN — SODIUM CHLORIDE SOLN NEBU 3% 4 ML: 3 NEBU SOLN at 14:24

## 2024-10-15 RX ADMIN — AMLODIPINE BESYLATE 5 MG: 5 TABLET ORAL at 08:08

## 2024-10-15 RX ADMIN — INSULIN LISPRO 2 UNITS: 100 INJECTION, SOLUTION INTRAVENOUS; SUBCUTANEOUS at 17:00

## 2024-10-15 RX ADMIN — METFORMIN HYDROCHLORIDE 750 MG: 750 TABLET, EXTENDED RELEASE ORAL at 17:00

## 2024-10-15 RX ADMIN — INSULIN LISPRO 6 UNITS: 100 INJECTION, SOLUTION INTRAVENOUS; SUBCUTANEOUS at 11:49

## 2024-10-15 RX ADMIN — GUAIFENESIN 600 MG: 600 TABLET ORAL at 08:08

## 2024-10-15 RX ADMIN — PREDNISONE 5 MG: 5 TABLET ORAL at 08:08

## 2024-10-15 RX ADMIN — GUAIFENESIN 600 MG: 600 TABLET ORAL at 17:01

## 2024-10-15 RX ADMIN — ENOXAPARIN SODIUM 40 MG: 40 INJECTION SUBCUTANEOUS at 08:08

## 2024-10-15 NOTE — PROGRESS NOTES
10/15/24 0700   Pain Assessment   Pain Assessment Tool 0-10   Pain Score No Pain   Restrictions/Precautions   Precautions Bed/chair alarms;Fall Risk;Supervision on toilet/commode;Cognitive   Eating   Type of Assistance Needed Independent   Eating CARE Score 6   Oral Hygiene   Type of Assistance Needed Supervision   Comment standing w/ RW at sink   Oral Hygiene CARE Score 4   Grooming   Able To Comb/Brush Hair;Wash/Dry Face;Brush/Clean Teeth;Wash/Dry Hands   Limitation Noted In Safety;Strength   Findings supervision standing at sink to brush teeth and wash hands, set up seated to comb hair and wash face.   Shower/Bathe Self   Type of Assistance Needed Supervision   Shower/Bathe Self CARE Score 4   Bathing   Assessed Bath Style Shower   Anticipated D/C Bath Style Shower   Able to Gather/Transport Yes   Able to Adjust Water Temperature Yes   Able to Wash/Rinse/Dry (body part) Left Arm;Right Arm;L Upper Leg;R Upper Leg;L Lower Leg/Foot;R Lower Leg/Foot;Chest;Abdomen;Perineal Area;Buttocks   Limitations Noted in Balance;Endurance;ROM;Safety;Strength;Problem Solving   Positioning Seated;Standing   Adaptive Equipment Shower Bars;Shower Seat;Hand Held Shower   Tub/Shower Transfer   Limitations Noted In Balance;Endurance;ROM;Safety;LE Strength;Problem Solving;Sequencing   Adaptive Equipment Grab Bars;Seat with Back   Assessed Shower   Findings CG   Upper Body Dressing   Type of Assistance Needed Set-up / clean-up   Upper Body Dressing CARE Score 5   Lower Body Dressing   Type of Assistance Needed Supervision   Lower Body Dressing CARE Score 4   Putting On/Taking Off Footwear   Type of Assistance Needed Adaptive equipment;Supervision   Comment sock aide   Putting On/Taking Off Footwear CARE Score 4   Picking Up Object   Type of Assistance Needed Adaptive equipment   Comment standing w/ RW and use of reacher to retrieve socks from floor   Picking Up Object CARE Score 6   Dressing/Undressing Clothing   Remove UB Clothes  Pullover Shirt   Don UB Clothes Pullover Shirt   Remove LB Clothes Pants   Don LB Clothes Pants;Socks;Shoes   Limitations Noted In Balance;Endurance;Problem Solving;Safety;Strength   Adaptive Equipment Sock Aide   Positioning Supported Sit;Standing   Lying to Sitting on Side of Bed   Type of Assistance Needed Independent   Lying to Sitting on Side of Bed CARE Score 6   Sit to Stand   Type of Assistance Needed Supervision   Sit to Stand CARE Score 4   Bed-Chair Transfer   Type of Assistance Needed Verbal cues;Incidental touching  (CG)   Comment VCs for safe body mechanics and RW placement   Chair/Bed-to-Chair Transfer CARE Score 4   Transfer Bed/Chair/Wheelchair   Limitations Noted In Balance;Endurance;LE Strength;Problem Solving   Adaptive Equipment Roller Walker   Toileting Hygiene   Type of Assistance Needed Supervision   Toileting Hygiene CARE Score 4   Toileting   Able to Pull Clothing down yes, up yes.   Manage Hygiene Bladder   Limitations Noted In Balance;ROM;Safety;UE Strength;LE Strength   Adaptive Equipment Grab Bar   Toilet Transfer   Type of Assistance Needed Incidental touching;Verbal cues  (CG)   Comment VCs to keep walker with body during the transfer to maintain safety.   Toilet Transfer CARE Score 4   Toilet Transfer   Surface Assessed Raised Toilet   Transfer Technique Standard   Limitations Noted In Balance;Endurance;ROM;Safety;UE Strength;LE Strength;Problem Solving   Adaptive Equipment Grab Bar;Walker  (RW)   Light Housekeeping   Light Housekeeping Level Walker  (RW)   Light Housekeeping Level of Assistance Contact guard   Light Housekeeping Pt drapped dirty clothing over RW, walked to closet in room at CG level and placed clothing into bag in closet maintaining good- balance throughout task.   Exercise Tools   Theraband Pt completed BUE 15x protraction/retraction   Other Exercise Tool 1 2# DB 15x BUE elbow flex/ex, shld. abduction/adduction, supination/pronation and wrist flexion/ex.    Cognition   Overall Cognitive Status WFL   Arousal/Participation Alert;Responsive;Cooperative   Attention Within functional limits   Orientation Level Oriented X4   Memory Decreased recall of precautions   Following Commands Follows one step commands without difficulty   Assessment   Treatment Assessment Pt agreeable to OT session this AM and recieved laying in bed. Pt completed ADLs, fxl transfers/mobility and therex. Pt reports mild SOB during session with long breaks taken in between tasks and use of w/c for long distances to conserve energy. VCs provided throughout transfers and fxl mobilty with RW to maintain proper body mechanics and overall safety with RW. barriers include decreased strength especially in R side s/p CVA, decreased endurance, impaired balance, decreased safety/judgement and difficulty problem solving. Pt will benefit from continued skilled OT services to address barriers and increase independence with daily tasks.   Prognosis Good   Problem List Decreased strength;Decreased endurance;Impaired balance;Impaired judgement;Decreased safety awareness   Plan   Treatment/Interventions ADL retraining;Functional transfer training;LE strengthening/ROM;Therapeutic exercise;Endurance training;Patient/family training;Equipment eval/education;Compensatory technique education;OT;Cognitive reorientation   Progress Progressing toward goals   OT Therapy Minutes   OT Time In 0700   OT Time Out 0831   OT Total Time (minutes) 91   OT Mode of treatment - Individual (minutes) 91

## 2024-10-15 NOTE — ASSESSMENT & PLAN NOTE
Lab Results   Component Value Date    HGBA1C 10.6 (H) 10/02/2024       Recent Labs     10/14/24  1620 10/14/24  2002 10/15/24  0711 10/15/24  1117   POCGLU 253* 193* 92 277*     Uncontrolled type 2 diabetes with A1C most recently of 10.6  Restarted home glipizide per IM and stopping Lantus  Wife tbrought in Home metformin XL 750mg pills  SSI and QID glucose checks  CCD

## 2024-10-15 NOTE — PCC SPEECH THERAPY
10/15     Cognitive Linguisitic Assessments   Repeatable Battery for the Assessment of Neuropsychological Status (RBANS) RBANS completed today     The Repeatable Battery for the Assessment of Neuropsychological Status (RBANS) is a brief, individually-administered assessment which measures attention, language, visuospatial/constructional abilities, and immediate & delayed memory. The RBANS is intended for use with adolescents to adults, ages 12 to 89 years. The following results were obtained during the administration of the assessment.     Form: A     Cognitive Domain/Subtest: Index Score: Percentile Rank: Classification:   IMMEDIATE MEMORY 94 34%ile Average        1. List Learning (22/40)        2. Story Memory (19/24)       VISUOSPATIAL/  CONSTRUCTIONAL 121 92%ile Superior        3. Figure Copy (20/20)        4. Line Orientation (18/20)       LANGUAGE 90 25%ile Average        5. Picture Naming (10/10)        6. Semantic Fluency (14/40)       ATTENTION 72 3%ile Borderline        7. Digit Span (8/16)        8. Coding (21/89)       DELAYED MEMORY 107 68%ile Average        9. List Recall (5/10)        10. List Recognition (20/20)        11. Story Recall (8/12)        12. Figure Recall (15/20)            Sum of Index Scores:  484   Total Score:  95   Percentile: 37%ile   Classification: Average      Pt presenting with mild attention deficits; however, patient denies any changes or difficulty he has observed. Informally, patient not demonstrating any difficulty conversing with SLP in complex conversational tasks. He demonstrates a good understanding of his medical situation. He does also verbalize good problem solving skills surrounding activities he think would be safe or unsafe and modifications as needed. Will work towards multiple areas of attention with dual tasks in therapy to maximize cognition as based on results of other areas of testing I do not feel this is his baseline.       Comprehension   Assist Devices  Glasses   Comprehension (FIM) 6 - Has only MILD difficulty with complex/abstract info   Expression   Expression (FIM) 7 - Expresses complex/abstract ideas in a reasonable time w/o devices or helper.   Social Interaction   Social Interaction (FIM) 7 - Interacts appropriately without assistive device, medication or helper   Problem Solving   Problem solving (FIM) 6 - Solves complex problems BUT requires extra time - varies supervision to mod I    Memory   Memory (FIM) 6 - Recognizes with extra time

## 2024-10-15 NOTE — PROGRESS NOTES
Progress Note - Gold Van 78 y.o. male MRN: [id number]    Unit/Bed#: [facility] 216-01 Encounter: [id number]        Subjective:   Patient seen and examined at bedside after reviewing the chart and discussing the case with the caring staff.      Patient examined at bedside.  Patient denies any acute symptoms today.    On review of patient's labs from 10/14/2024.  Patient's CMP showed sodium 134.  Patient CBC showed hemoglobin 10.1 with hematocrit 31.2.    Physical Exam   Vitals: Blood pressure 112/53, pulse 73, temperature (!) 97.4 °F (36.3 °C), temperature source Temporal, resp. rate 18, height 6' (1.829 m), weight 70.6 kg (155 lb 10.3 oz), SpO2 97%.,Body mass index is 21.11 kg/m².  Constitutional: Patient in no acute distress.  HEENT: PERR, EOMI, MMM.  Cardiovascular: Normal rate and regular rhythm.    Pulmonary/Chest: Effort normal and breath sounds normal.   Abdomen: Soft, + BS, NT.    Assessment/Plan:  Gold Van is a(n) 78 y.o. male with CVA.     Hypertension.  Patient is on lisinopril 20 mg twice daily, amlodipine 5 mg twice daily, atenolol 25 mg daily.  Type 2 diabetes mellitus.  Hgb A1c 10.6% on 10/2/24.  Restart home glipizide 2.5 mg twice daily 10/4 and and discontinue Lantus.  Wife brought in metformin  mg daily.  SSI with accu-checks ac/hs.  Carb controlled diet.  GERD.  Patient is on famotidine 20 mg twice daily.   Chronic generalized pruritus.  Ongoing past 5-6 years.  Pt/wife reports receiving extensive workup in the past from various specialists including dermatology and allergist.  Continue alternating prednisone 5 mg and 15 mg daily.    COPD/asthma.  Rhiannoni brought from home.  Continue Singulair 10 mg nightly and Xopenox/sodium chloride neb TID.  [facility]    Pneumonia.  Sputum C&S on 10/1 positive for pseudomonas.  Patient was treated with IV cefepime and then transitioned to PO Vantin to complete 5-day course of antibiotics as recommended by pulmonology.   Continue Vantin 400 mg twice daily x 4 more doses (thru 10/6), Mucinex 600 mg twice daily, Xopenox/sodium chloride neb TID.  Tessalon perles as needed.  Incentive spirometry.   Abnormal echocardiogram.  TTE on 10/1 with posterior wall motion abnormality.  Cardiology saw patient on acute care and recommended outpatient stress test.  Hyponatremia.  Level 134 -> 133 on 10/5/24.  Cont to monitor.    Malnutrition.  Adult Malnutrition type: Chronic illness.  Adult Degree of Malnutrition: Malnutrition of moderate degree. Malnutrition Characteristics: Weight loss, Inadequate energy, Fat loss.  Dietician following.  Pain and bowel regimen.  As per physiatry.  Patient started on Senokot S daily as needed and MiraLAX as needed 10/5/2024.  CVA.   Neurology recommended DAPT with Plavix/ASA x90 days total (~12/29/24) then ASA monotherapy.  Cont atorvastatin 40 mg daily.  Follow-up with neurology in 6 weeks.  Follow-up with cardiology for Zio patch and/or loop recorder on discharge.  Patient is receiving intensive PT OT with management as per physiatry.     Anticipated date of discharge: Thursday 10/17/2024

## 2024-10-15 NOTE — CASE MANAGEMENT
During team conference today, it was revealed that patient's functioning is not consistent, and requesting additional days if possible. CM called [facility], left a message on voice mail of this request. CM made [facility] aware this is an addendum to yesterday's review sent to [facility]. CM left contact information, with request for return phone call.  CM met with patient and wife Stacia to discuss discharge plan and to review team meeting information. Dr. Munoz already in room talking with patient and wife. Stacia inquiring about ordering a lightweight wheelchair. CM made her aware CM will order through [employer]. When CM made them aware [facility] accepted patient for homecare, Satcia stated that after speaking with Dr. Munoz, they would prefer to do outpt, and if possible, to have the treatments back to back , as insurance charges $35/day of outpt therapy.   CM cancelled homecare referral with [facility]. CM called [facility]'s outpt [place] location , spoke with Casi, requested to schedule outpt PT/OT/ST. Cm made Casi aware with patient's insurance, the copay will be $35/day, CM requesting to schedule multiple therapies in one day, as per wife request. Casi scheduled PT for 10/21 at 7:45. Csai stated If multiple therapies scheduled in same day, the first visit would be later in the schedule. Casi stated OT and ST can be scheduled with the patient or wife on the first PT appt. This information placed on discharge instructions.  CM reviewed team meeting information, and made them aware CM called insurance company to see if his time could be extended in the [facility]. Patient was very clear that he does not want to stay longer than the planned d/c date of 10/17.   Lightweight wheelchair order form faxed to [employer] [phone], with request to send order to [place] for delivery.  CM will continue to follow.

## 2024-10-15 NOTE — PROGRESS NOTES
"   10/15/24 1000   Pain Assessment   Pain Assessment Tool 0-10   Pain Score No Pain   Restrictions/Precautions   Precautions Bed/chair alarms;Cognitive;Fall Risk;Supervision on toilet/commode   Weight Bearing Restrictions No   ROM Restrictions No   Cognition   Overall Cognitive Status WFL   Arousal/Participation Alert;Responsive;Cooperative   Attention Within functional limits   Orientation Level Oriented X4   Memory Decreased recall of precautions   Following Commands Follows one step commands without difficulty   Subjective   Subjective Pt reports he feels \"pretty good\" this morning   Roll Left and Right   Comment Pt OOB   Sit to Lying   Comment Pt OOB   Lying to Sitting on Side of Bed   Comment Pt OOB   Sit to Stand   Type of Assistance Needed Supervision   Physical Assistance Level No physical assistance   Comment RW   Sit to Stand CARE Score 4   Bed-Chair Transfer   Type of Assistance Needed Supervision   Physical Assistance Level No physical assistance   Comment Cl S RW   Chair/Bed-to-Chair Transfer CARE Score 4   Car Transfer   Reason if not Attempted Environmental limitations   Car Transfer CARE Score 10   Walk 10 Feet   Type of Assistance Needed Supervision   Physical Assistance Level No physical assistance   Comment Cl S RW   Walk 10 Feet CARE Score 4   Walk 50 Feet with Two Turns   Type of Assistance Needed Supervision   Physical Assistance Level No physical assistance   Comment Cl S RW   Walk 50 Feet with Two Turns CARE Score 4   Walk 150 Feet   Type of Assistance Needed Supervision   Physical Assistance Level No physical assistance   Comment Cl S RW   Walk 150 Feet CARE Score 4   Walking 10 Feet on Uneven Surfaces   Type of Assistance Needed Incidental touching   Physical Assistance Level No physical assistance   Comment CGA RW on green foam   Walking 10 Feet on Uneven Surfaces CARE Score 4   Ambulation   Primary Mode of Locomotion Prior to Admission Walk   Distance Walked (feet) 230 ft  (230', 150' x " "2, 100')   Assist Device Roller Walker   Findings Cl S RW   Does the patient walk? 2. Yes   Wheel 50 Feet with Two Turns   Reason if not Attempted Activity not applicable   Wheel 50 Feet with Two Turns CARE Score 9   Wheel 150 Feet   Reason if not Attempted Activity not applicable   Wheel 150 Feet CARE Score 9   Wheelchair mobility   Does the patient use a wheelchair? 0. No   Findings N/A   Curb or Single Stair   Style negotiated Single stair   Type of Assistance Needed Supervision;Verbal cues   Physical Assistance Level No physical assistance   Comment Cl S SPC + R HR up/down 14 step, VC for sequencing   1 Step (Curb) CARE Score 4   4 Steps   Type of Assistance Needed Supervision;Verbal cues   Physical Assistance Level No physical assistance   Comment Cl S SPC + R HR up/down 14 step, VC for sequencing   4 Steps CARE Score 4   12 Steps   Type of Assistance Needed Supervision;Verbal cues   Physical Assistance Level No physical assistance   Comment Cl S SPC + R HR up/down 14 step, VC for sequencing   12 Steps CARE Score 4   Stairs   Type Ramp;Curb   Weight Bearing Precautions Fall Risk   Assist Devices Roller Walker   Findings Cl S 6\" curb step and ramp negotiation; VC for sequencing   Toilet Transfer   Comment Pt did not require during session   Therapeutic Interventions   Strengthening Forward step ups on 6\" steps 2 x 10, eccentric step downs 2 x 5   Other transfer training, gait training, stair training, curb step, ramp negotiation   Assessment   Treatment Assessment Pt agreeable to PT this AM, received sitting upright in recliner. Demonstrated all functional activities at S level, requiring intermittent VC for direction and sequencing. Pt continues to be limited by LE fatigue and decreased endurance, will challenge car transfer and ambulation outdoors on uneven surfaces as able this afternoon. Will continue with current PT POC to improve deficits and promote return to PLOF.   Problem List Decreased " strength;Decreased endurance;Impaired balance;Impaired judgement;Decreased safety awareness   PT Barriers   Physical Impairment Decreased strength;Decreased range of motion;Decreased endurance;Impaired balance;Decreased mobility;Decreased coordination;Decreased safety awareness;Impaired vision   Functional Limitation Wheelchair management;Walking;Transfers;Standing;Stair negotiation;Ramp negotiation;Car transfers   Plan   Treatment/Interventions Functional transfer training;LE strengthening/ROM;Therapeutic exercise;Endurance training;Cognitive reorientation;Patient/family training;Equipment eval/education;Bed mobility;Gait training   Progress Progressing toward goals   PT Therapy Minutes   PT Time In 1000   PT Time Out 1100   PT Total Time (minutes) 60   PT Mode of treatment - Individual (minutes) 60   PT Mode of treatment - Concurrent (minutes) 0   PT Mode of treatment - Group (minutes) 0   PT Mode of treatment - Co-treat (minutes) 0   PT Mode of Treatment - Total time(minutes) 60 minutes   PT Cumulative Minutes 1078     Patient remains OOB in chair, all needs in reach.  Alarm in place and activated.  Encouraged use of call bell, patient verbalizes understanding.

## 2024-10-15 NOTE — PROGRESS NOTES
"   10/15/24 1230   Pain Assessment   Pain Assessment Tool 0-10   Pain Score No Pain   Restrictions/Precautions   Precautions Bed/chair alarms;Cognitive;Fall Risk;Supervision on toilet/commode   Weight Bearing Restrictions No   ROM Restrictions No   Cognition   Overall Cognitive Status WFL   Arousal/Participation Alert;Responsive;Cooperative   Attention Within functional limits   Orientation Level Oriented X4   Memory Decreased recall of precautions   Following Commands Follows one step commands without difficulty   Subjective   Subjective \"Feeling better than last week\"   Roll Left and Right   Comment Pt OOB   Sit to Lying   Comment Pt OOB   Lying to Sitting on Side of Bed   Comment Pt OOB   Sit to Stand   Type of Assistance Needed Supervision   Physical Assistance Level No physical assistance   Comment with RW   Sit to Stand CARE Score 4   Bed-Chair Transfer   Type of Assistance Needed Supervision   Physical Assistance Level No physical assistance   Comment with RW   Chair/Bed-to-Chair Transfer CARE Score 4   Car Transfer   Type of Assistance Needed Supervision;Verbal cues   Physical Assistance Level No physical assistance   Comment Supervision with RW and cues for sequence and technique   Car Transfer CARE Score 4   Walk 10 Feet   Type of Assistance Needed Supervision   Physical Assistance Level No physical assistance   Comment Supervision with RW on level and unlevel surfaces.   Walk 10 Feet CARE Score 4   Walk 50 Feet with Two Turns   Type of Assistance Needed Supervision   Physical Assistance Level No physical assistance   Comment Supervision with RW on level and unlevel surfaces.   Walk 50 Feet with Two Turns CARE Score 4   Walk 150 Feet   Comment Session focused on TE and car transfers   Walking 10 Feet on Uneven Surfaces   Type of Assistance Needed Supervision   Physical Assistance Level No physical assistance   Comment Supervision with RW on level and unlevel surfaces.   Walking 10 Feet on Uneven Surfaces " CARE Score 4   Ambulation   Primary Mode of Locomotion Prior to Admission Walk   Distance Walked (feet) 117 ft  (117 ft, short distances in PT gym)   Assist Device Roller Walker   Gait Pattern Ataxic;Inconsistant Marsha;Decreased foot clearance;R foot drag;Shuffle   Limitations Noted In Balance;Coordination;Device Management;Heel Strike;Endurance;Posture;Safety;Strength   Provided Assistance with: Balance   Walk Assist Level Supervision   Findings Supervision with RW on level and unlevel surfaces.   Does the patient walk? 2. Yes   Wheelchair mobility   Does the patient use a wheelchair? 0. No   Curb or Single Stair   Comment completed in earlier session   Toilet Transfer   Comment not needed during session   Therapeutic Interventions   Strengthening seated TE   Balance gait and transfer training   Other car transfer   Equipment Use   moka5 lvl 4, 10 minutes   Assessment   Treatment Assessment Pt agreeable to PT session this afternoon. No reports of pain throughout session. Supervision with RW for transfers. Supervision with RW for ambulation on level and unlevel surfaces up to 117 ft. Supervision for car transfer with RW and cues for sequence and technique. Pt trialed two different techniques for car transfer and felt both were good. Seated TE for general LE strengthening. Gait and transfer training for increased balance, safety, and independence with functional mobility. WifeStaica present for second half of session for family training. Demonstrated car transfer and discussed AD use post d/c. All questions and concerns addressed with family. Pt returned to room in recliner with alarms on and all needs within reach.   Family/Caregiver Present Stacia Blevins   Problem List Decreased strength;Decreased endurance;Impaired balance;Impaired judgement;Decreased safety awareness   Barriers to Discharge Inaccessible home environment;Decreased caregiver support   PT Barriers   Physical Impairment Decreased  strength;Decreased range of motion;Decreased endurance;Impaired balance;Decreased mobility;Decreased coordination;Decreased safety awareness;Impaired vision   Functional Limitation Wheelchair management;Walking;Transfers;Standing;Stair negotiation;Ramp negotiation;Car transfers   Plan   Treatment/Interventions Functional transfer training;LE strengthening/ROM;Therapeutic exercise;Endurance training;Cognitive reorientation;Patient/family training;Equipment eval/education;Bed mobility;Gait training   Progress Progressing toward goals   PT Therapy Minutes   PT Time In 1230   PT Time Out 1330   PT Total Time (minutes) 60   PT Mode of treatment - Individual (minutes) 60   PT Mode of treatment - Concurrent (minutes) 0   PT Mode of treatment - Group (minutes) 0   PT Mode of treatment - Co-treat (minutes) 0   PT Mode of Treatment - Total time(minutes) 60 minutes   PT Cumulative Minutes 1138   Therapy Time missed   Time missed? No

## 2024-10-15 NOTE — NURSING NOTE
Patient is awake and alert . Pleasant with interactions.  Participated in therapy sessions and tolerated same well.  Denies complaints of pain or discomfort.  Call bell in reach .  Safety maintained.

## 2024-10-15 NOTE — PROGRESS NOTES
Progress Note - PMR   Name: Gold Van 78 y.o. male I MRN: [id number]  Unit/Bed#: -01 I Date of Admission: 10/4/2024   Date of Service: 10/15/2024 I Hospital Day: 11     Assessment & Plan  CVA (cerebrovascular accident) ([facility])  MRI revealed multiple foci of acute infarcts in the bilateral frontoparietal cortices and subcortical white matter left > right  Placed on DAPT and statin for secondary stroke prophylaxis per neurology and follow up with Neuro in 6 weeks (DAPT for 3 months) through 1/1/2025  Etiology thought to be coagulopathy secondary to COVID infection  CT CAP completed to rule out malignancy  TTE showing 50% EF but posterior wall hypokinesis and will need cardiology follow up  Patient endorses improvement in right sided weakness. Has ongoing dysmetria and incoordination in the right nahomi body  Physical, Occupational, and Speech therapy      Type 2 diabetes mellitus with complication, without long-term current use of insulin ([facility])  Lab Results   Component Value Date    HGBA1C 10.6 (H) 10/02/2024       Recent Labs     10/14/24  1620 10/14/24  2002 10/15/24  0711 10/15/24  1117   POCGLU 253* 193* 92 277*     Uncontrolled type 2 diabetes with A1C most recently of 10.6  Restarted home glipizide per IM and stopping Lantus  Wife tbrought in Home metformin XL 750mg pills  SSI and QID glucose checks  CCD    Essential hypertension  Atenolol 25 mg daily as well as amlodipine 5 mg twice daily and lisinopril 20 mg twice daily  Management per internal medicine and monitor blood pressures and therapy and on routine vitals  Hyperlipidemia  Continue statin  Sepsis due to pneumonia ([facility])  Met sepsis criteria on admission with tahcycardia, leukocytosis. Lactic acidosis. Also with acute repsiratory failure requiring CPAP-> BiPAP and eventually nasal canula  Blood cultures felt to be contaminants  CXR with left base opacity atelectasis vs pneumonia  S/P IV Ceftriaxone and azithromycin for 4 days  Sputum C&S was  positive for Pseudomonas-received cefepime/Azithromycin while inpatient, transitioning to Vantin for total of 2 more days (5 days course of treatment as recommended by pulmonology)   Pulm followed in acute care  IV steroids weaned and now on prior chronic prednisone regimen  Hyponatremia  Patient presents with hyponatremia sodium of 134 as of 10/3  Continue to monitor on biweekly labs or sooner if clinically indicated  Pruritus  Multiple years history of generalized pruritus with extensive work up in past  On alternating prednisone doses  Monitor for any changes  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HCC)  Suspected COPD exacerbation in setting of COVID infection  Initially requiring CPAP, transitioned to BiPAP, successfully weaned to room air  Appreciate input of pulmonology who followed  Received IV Solu-Medrol, transitioned to oral prednisone on 10/2  Continue robust regimen of meds for baseline COPD    COVID-19  Presented with shortness of breath and was in the hospital 9/24/24 for acute respiratory symptoms related to infection  Received remdesivir and baricitinib  Felt that strokes related COVID coagulopathy  Abnormal echocardiogram  Had TTE with posterior wall abnormalities/mild hypokinesis noted. EF 50%  Follow up with cardiology as an outpatient, would benefit from monitor and stress test  Gastroesophageal reflux disease without esophagitis  Continue pepcid  Impaired mobility and activities of daily living  Patient was evaluated by the rehabilitation team MD and an appropriate candidate for acute inpatient rehabilitation program at this time.  The patient will tolerate 3 hours/day 5 to 7 days/week of intensive physical, occupational and speech therapy in order to obtain goals for community discharge  Due to the patient's functional Compared to their baseline level of function in addition to their ongoing medical needs, the patient would benefit from daily supervision from a rehabilitation physician as well as rehabilitation  nursing to implement and adjust the medical as well as functional plan of care in order to meet the patient's goals.    Neuropathy  Neuropathy in feet and lower 1/3 of the tibia bilaterally  Undiagnosed previously, but to consider in therapies  Likely related to diabetes  Consider gabapentin, however not uncomfortable at this time  Foot drop, left  Patient states he has had issues with the foot for some time now  Presents with 1/5 ankle dorsiflexor strength on the left  Eval for bracing  Moderate protein-calorie malnutrition (HCC)  Malnutrition Findings:   Adult Malnutrition type: Chronic illness  Adult Degree of Malnutrition: Malnutrition of moderate degree  Malnutrition Characteristics: Weight loss, Inadequate energy, Fat loss                  360 Statement: Malnutrition related to inadequate energy intake as evidenced by 15#(9%) weight loss from 8/7/# to 10/4/#, subcutaneous fat loss orbital/cheek region and extremities, <75% energy intake compared to estimated needs >1 month.    BMI Findings:           Body mass index is 21.11 kg/m².       Subjective   Gold Van is a 78 y.o. male with history of hypertension, type 2 diabetes, COPD, chronic steroid use who presented to the [facility] initially on 9/29/2024 for shortness of breath. Patient with a history of COPD and asthma and recent COVID infection with hospitalization. He arrived via ambulance with a CPAP on and then required a transition to BiPAP in the ER. He was initiated on IV cefepime for suspected pneumonia and also IV steroids. He met sepsis criteria for the tachycardia and leukocytosis in the setting of dental pneumonia. He had some difficulty with coordination of his right leg during a PT session as of 10/1 and neurology evaluated the patient and recommended stroke. An MRI of the brain did note multiple acute infarcts in the bilateral frontoparietal cortices and subcortical white matter changes without evidence  of mass effect or hemorrhagic transformation. Patient was started on dual antiplatelet therapy and statin and additionally had a CT of the chest abdomen pelvis to rule out other causes of hypercoagulability, stroke, malignancy. Hypercoagulability it was thought to be related to his prior COVID infection. He was on telemetry without any issues and no atrial fibrillation noted. He did have a TTE that noted some wall motion abnormality in the posterior sections. Plan for outpatient monitor and stress test. Pulmonology also following the patient and he continued with cefepime and azithromycin and is discharging on 2 additional days for coverage of Pseudomonas. This was grown in the sputum and was recommended by pulmonology. The patient was evaluated by the Rehabilitation team and deemed an appropriate candidate for comprehensive inpatient rehabilitation and admitted to the [facility] on 10/4/2024 1:24 PM     Chief Complaint: f/u stroke and discharge planning    Interval: Patient seen and evaluated in room.  Overall is tired at times after therapy while participating.  His wife is also present and we talked about several different things including medications and medication management.  She states she talked to her insurance company who said that clopidogrel or Plavix is not covered but other forms of clopidogrel that are not may be covered however is unclear the meaning of this.  The insurance person also suggested other medications would not be appropriate for secondary stroke prophylaxis.  Sent to pharmacy for price check and will get back to the patient and wife.  Additionally plan for ordering a wheelchair for the patient but will need to be sent to a very specific DME company and additionally will be doing outpatient therapy as the patient's wife was able to call and verify that the co-pay will be for the day and not for each individual session which makes this easier from a financial burden aspect and appropriate.   Otherwise the patient is not having any fever, chills, nausea or vomiting, cough or shortness of breath at this time although it seems that he has some days that are better from a breathing perspective than others and sometimes from an insurance perspective.  Discussed case in weekly team conference and barriers with therapies, nursing and case management.  We will try and get coverage through October 21 as the patient would benefit from continued therapies.  All questions answered    Objective :  Temp:  [97 °F (36.1 °C)-97.4 °F (36.3 °C)] 97.4 °F (36.3 °C)  HR:  [60-73] 73  BP: (102-112)/(53-60) 112/53  Resp:  [18] 18  SpO2:  [97 %-98 %] 97 %  O2 Device: None (Room air)    Functional Update:  Physical Therapy Occupational Therapy Speech Therapy   Weight Bearing Status: Weight Bearing as Tolerated  Transfers:  (close S up to cg/MIN A)  Bed Mobility: Independent  Amulation Distance (ft): 165 feet  Ambulation:  (cg/close S up to MIN A (outside surfaces))  Assistive Device for Ambulation: Roller Walker  Wheelchair Mobility Distance: 174 ft  Wheelchair Mobility: Independent  Number of Stairs: 14  Assistive Device for Stairs:  (single HR + SPC)  Stair Assistance:  (cg/close S)  Ramp: Minimal Assistance  Assistive Device for Ramp: Roller Walker  Discharge Recommendations: Home with:  DC Home with:: Family Support, First Floor Setup, Home Physical Therapy   Eating: Independent  Grooming: Supervision  Bathing: Supervision  Bathing: Supervision  Upper Body Dressing: Independent  Lower Body Dressing: Supervision  Toileting: Supervision  Tub/Shower Transfer: Incidental Touching  Toilet Transfer: Incidental Touching  Cognition: Within Defined Limits  Orientation: Person, Place, Time, Situation   Mode of Communication: Verbal  Cognition: Exceptions to WNL  Cognition: Decreased Attention  Orientation: Person, Place, Time, Situation  Swallowing: Within Defined Limits  Diet Recommendations: Regular Diet, Thin  Discharge  Recommendations: Home with:  DC Home with:: Family Support, Outpatient Speech Therapy       Physical Exam  Vitals reviewed.   Constitutional:       General: He is not in acute distress.  HENT:      Head: Normocephalic and atraumatic.      Right Ear: External ear normal.      Left Ear: External ear normal.      Nose: Nose normal. No rhinorrhea.      Mouth/Throat:      Mouth: Mucous membranes are moist.      Pharynx: Oropharynx is clear.   Eyes:      General: No scleral icterus.  Cardiovascular:      Rate and Rhythm: Normal rate.   Pulmonary:      Effort: Pulmonary effort is normal. No respiratory distress.   Abdominal:      General: There is no distension.      Palpations: Abdomen is soft.   Musculoskeletal:      Right lower leg: Edema present.      Left lower leg: Edema present.      Comments: Trace at the ankle level   Skin:     General: Skin is warm and dry.      Comments: Ecchymosis and stages of healing in the bilateral lower extremities and arms   Neurological:      Mental Status: He is alert and oriented to person, place, and time.      Comments: Right-sided dysmetria with left-sided foot drop and bilateral lower extremity sensory deficits.  MMT not completed today   Psychiatric:         Mood and Affect: Mood normal.         Behavior: Behavior normal.           Scheduled Meds:  Current Facility-Administered Medications   Medication Dose Route Frequency Provider Last Rate    acetaminophen  650 mg Oral Q6H PRN Annie L Dagnall, PA-C      albuterol  2 puff Inhalation Q4H PRN Annie L Dagnall, PA-C      albuterol  2.5 mg Nebulization Q4H PRN Annie L Dagnall, PA-C      amLODIPine  5 mg Oral BID Annie L Dagnall, PA-C      aspirin  81 mg Oral Daily Annie L Dagnall, PA-C      atenolol  25 mg Oral Daily Annie L Dagnall, PA-C      atorvastatin  40 mg Oral QPM Annie L Dagnall, PA-C      benzonatate  100 mg Oral TID PRN Annie L Dagnall, PA-C      Budeson-Glycopyrrol-Formoterol  2 puff Inhalation Q12H Annie  L Dagnall, PA-C      clopidogrel  75 mg Oral Daily Annie L Chelseynall, PA-C      enoxaparin  40 mg Subcutaneous Daily Annie L Dagnall, PA-C      famotidine  20 mg Oral BID Annie L Dagnall, PA-C      glipiZIDE  2.5 mg Oral BID With Meals Annie L Dagnall, PA-C      guaiFENesin  600 mg Oral BID Annie L Dagnall, PA-C      insulin lispro  1-6 Units Subcutaneous HS Annie L Dagnall, PA-C      insulin lispro  2-12 Units Subcutaneous TID AC Annie L Dagnall, PA-C      levalbuterol  1.25 mg Nebulization TID Annie L Dagnall, PA-C      lisinopril  20 mg Oral BID Annie L Chelseynall, PA-C      LORazepam  0.5 mg Oral HS PRN Annie L Chelseynall, PA-C      metFORMIN  750 mg Oral Daily With Dinner Christian Hendrickson MD      montelukast  10 mg Oral HS Annie BROWN Felecial, PA-C      polyethylene glycol  17 g Oral Daily PRN Christian Hendrickson MD      predniSONE  15 mg Oral Every Other Day Annie L Chelseynall, PA-C      predniSONE  5 mg Oral Every Other Day Annie Barbosanall, PA-C      senna-docusate sodium  1 tablet Oral HS PRN Christian Hendrickson MD      sodium chloride  4 mL Nebulization TID Annie Barbosamahadl, PA-C           Lab Results: I have reviewed the following results:  Results from last 7 days   Lab Units 10/14/24  0537   HEMOGLOBIN g/dL 10.1*   HEMATOCRIT % 31.2*   WBC Thousand/uL 7.94   PLATELETS Thousands/uL 225     Results from last 7 days   Lab Units 10/14/24  0537   BUN mg/dL 18   SODIUM mmol/L 134*   POTASSIUM mmol/L 4.2   CHLORIDE mmol/L 99   CREATININE mg/dL 0.86   AST U/L 10*   ALT U/L 15                Brandon Munoz,   Physical Medicine and Rehabilitation  [facility]    I have spent a total time of 60 minutes in caring for this patient on the day of the visit/encounter including Patient and family education, Importance of tx compliance, Counseling / Coordination of care, Documenting in the medical record, Reviewing / ordering tests, medicine, procedures  , and Communicating with other healthcare  professionals . This patient was discussed by the interdisciplinary team in weekly case conference today. The care of the patient was extensively discussed with all care providers and an appropriate rehabilitation plan was formulated unique for this patient. Barriers were identified preventing progression of therapy and appropriate interventions were discussed with each discipline. Please see the team note for input from all disciplines regarding barriers, intervention, and discharge planning.

## 2024-10-15 NOTE — ASSESSMENT & PLAN NOTE
MRI revealed multiple foci of acute infarcts in the bilateral frontoparietal cortices and subcortical white matter left > right  Placed on DAPT and statin for secondary stroke prophylaxis per neurology and follow up with Neuro in 6 weeks (DAPT for 3 months) through 1/1/2025  Etiology thought to be coagulopathy secondary to COVID infection  CT CAP completed to rule out malignancy  TTE showing 50% EF but posterior wall hypokinesis and will need cardiology follow up  Patient endorses improvement in right sided weakness. Has ongoing dysmetria and incoordination in the right nahomi body  Physical, Occupational, and Speech therapy

## 2024-10-15 NOTE — ASSESSMENT & PLAN NOTE
Dr. Gomez at bedside.   Patient states he has had issues with the foot for some time now  Presents with 1/5 ankle dorsiflexor strength on the left  Eval for bracing

## 2024-10-15 NOTE — TEAM CONFERENCE
Acute RehabilitationTeam Conference Note  Date: 10/15/2024   Time: 11:22 AM       Patient Name:  Gold Van       Medical Record Number: [id number]   YOB: 1945  Sex: Male          Room/Bed:  [facility] 216/[facility] 216-01  Payor Info:  [facility] / Plan: [employer]  REP / Product Type: Medicare PPO /      Admitting Diagnosis: Multiple lacunar infarcts (HCC) [I63.81]   Admit Date/Time:  10/4/2024  1:24 PM  Admission Comments: No comment available     Primary Diagnosis:  CVA (cerebrovascular accident) ([facility])  Principal Problem: CVA (cerebrovascular accident) ([facility])    Patient Active Problem List    Diagnosis Date Noted    Moderate protein-calorie malnutrition ([facility]) 10/09/2024    Gastroesophageal reflux disease without esophagitis 10/04/2024    Impaired mobility and activities of daily living 10/04/2024    Neuropathy 10/04/2024    Foot drop, left 10/04/2024    Abnormal echocardiogram 10/03/2024    CVA (cerebrovascular accident) ([facility]) 10/02/2024    Dysmetria 10/01/2024    COVID-19 09/24/2024    Acute respiratory insufficiency 09/24/2024    Shortness of breath 07/23/2024    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[facility]) 04/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    History of pneumothorax 04/28/2024    Acute respiratory failure with hypoxia ([facility]) 04/28/2024    Non-recurrent bilateral inguinal hernia without obstruction or gangrene 03/18/2024    Pruritus 03/18/2024    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 06/16/2021    Sepsis due to pneumonia ([facility]) 03/24/2021    Hyponatremia 03/24/2021    Pulmonary nodule 03/24/2021    Type 2 diabetes mellitus with complication, without long-term current use of insulin ([facility]) 10/23/2017    Essential hypertension 10/23/2017    Asthma 10/23/2017    Hyperlipidemia 10/23/2017       Physical Therapy:    Weight Bearing Status: Weight Bearing as Tolerated  Transfers:  (close S up to cg/MIN A)  Bed Mobility: Independent  Amulation Distance (ft): 165 feet  Ambulation:  (cg/close S up to MIN A  (outside surfaces))  Assistive Device for Ambulation: Roller Walker  Wheelchair Mobility Distance: 174 ft  Wheelchair Mobility: Independent  Number of Stairs: 14  Assistive Device for Stairs:  (single HR + SPC)  Stair Assistance:  (cg/close S)  Ramp: Minimal Assistance  Assistive Device for Ramp: Roller Walker  Discharge Recommendations: Home with:  DC Home with:: Family Support, First Floor Setup, Home Physical Therapy    10/08/2024:  Patient being followed by PT, making slow but steady progress.  Presents, following CVA, now with impaired vision, decreased coordination R > L, decreased proprioception R > L, decreased ROM/strength, decreased balance and safety, decreased endurance.   Patient S/mod I bed mobility, CGA / Cl S transfers with SPC, Virgie ambulation up to 190 feet on level and unlevel surfaces with SPC, CGA/Virgie  negotiation of 14 steps with single handrail + SPC.  Patient would benefit from continued inpatient ARC PT to increase function, safety, and increased independence in prep for safe d/c to home.    10/15/2024:   Patient participating in therapy and making positive gains.  DAKOTA fluctuate depending on fatigue level.   Patient MOD I bed mobility, close S up to cg/MIN A transfers with walker, cg/close S up to MIN A ambulation with RW level and unlevel surfaces, MIN A ambulation on outside surfaces with walker, MOD I wheelchair mobility level and unlevel surfaces, CG/close S 14 steps with single HR + SPC, MIN A car transfer.  Remains limited by impaired vision, decreased coordination R > L, decreased proprioception R > L, decreased ROM/strength, decreased balance and safety, decreased endurance.  Patient would benefit from continued inpatient ARC PT to increase function, safety, and increased independence, as well as completed family training, in prep for safe d/c to home.    Occupational Therapy:  Eating: Independent  Grooming: Supervision  Bathing: Supervision  Bathing: Supervision  Upper Body  Dressing: Independent  Lower Body Dressing: Supervision  Toileting: Supervision  Tub/Shower Transfer: Incidental Touching  Toilet Transfer: Incidental Touching  Cognition: Within Defined Limits  Orientation: Person, Place, Time, Situation  Discharge Recommendations: Home with:  DC Home with:: Family Support       10/07/24 Pt participating in ADL education,safety with functional txfs and mobility, TE/TA for generalized strength/ROM/coordination. Pts LOF noted above. Pt making positive gains toward OT goals. Pt barriers include: decreased strength/coordination and endurance, visual impairment, decreased safety and balance, generalized endurance. Plan is to continue with skilled OT to further progress pt toward OT goals.     10/15/24: Pt participating in ADLs, safety with functional txfs and mobility, TE/TA for generalized strength/ROM/coordination and education. Pts LOF noted above. Pt barriers include: decreased strength/endurance, decreased safety and judgement, and impaired balance. Pt is progressing toward goals. Plan is to continue with skilled OT to further progress pt toward OT goals.     Speech Therapy:  Mode of Communication: Verbal  Cognition: Exceptions to WNL  Cognition: Decreased Attention  Orientation: Person, Place, Time, Situation  Swallowing: Within Defined Limits  Diet Recommendations: Regular Diet, Thin  Discharge Recommendations: Home with:  DC Home with:: Family Support, Outpatient Speech Therapy  10/15     Cognitive Linguisitic Assessments   Repeatable Battery for the Assessment of Neuropsychological Status (RBANS) RBANS completed today     The Repeatable Battery for the Assessment of Neuropsychological Status (RBANS) is a brief, individually-administered assessment which measures attention, language, visuospatial/constructional abilities, and immediate & delayed memory. The RBANS is intended for use with adolescents to adults, ages 12 to 89 years. The following results were obtained during the  administration of the assessment.     Form: A     Cognitive Domain/Subtest: Index Score: Percentile Rank: Classification:   IMMEDIATE MEMORY 94 34%ile Average        1. List Learning (22/40)        2. Story Memory (19/24)       VISUOSPATIAL/  CONSTRUCTIONAL 121 92%ile Superior        3. Figure Copy (20/20)        4. Line Orientation (18/20)       LANGUAGE 90 25%ile Average        5. Picture Naming (10/10)        6. Semantic Fluency (14/40)       ATTENTION 72 3%ile Borderline        7. Digit Span (8/16)        8. Coding (21/89)       DELAYED MEMORY 107 68%ile Average        9. List Recall (5/10)        10. List Recognition (20/20)        11. Story Recall (8/12)        12. Figure Recall (15/20)            Sum of Index Scores:  484   Total Score:  95   Percentile: 37%ile   Classification: Average      Pt presenting with mild attention deficits; however, patient denies any changes or difficulty he has observed. Informally, patient not demonstrating any difficulty conversing with SLP in complex conversational tasks. He demonstrates a good understanding of his medical situation. He does also verbalize good problem solving skills surrounding activities he think would be safe or unsafe and modifications as needed. Will work towards multiple areas of attention with dual tasks in therapy to maximize cognition as based on results of other areas of testing I do not feel this is his baseline.       Comprehension   Assist Devices Glasses   Comprehension (FIM) 6 - Has only MILD difficulty with complex/abstract info   Expression   Expression (FIM) 7 - Expresses complex/abstract ideas in a reasonable time w/o devices or helper.   Social Interaction   Social Interaction (FIM) 7 - Interacts appropriately without assistive device, medication or helper   Problem Solving   Problem solving (FIM) 6 - Solves complex problems BUT requires extra time   Memory   Memory (FIM) 6 - Recognizes with extra time       Nursing Notes:  Appetite:  Good  Diet Type: Diabetic                      Diet Patient/Family Education Complete: Yes    Type of Wound (LDA): Wound                    Type of Wound Patient/Family Education: Yes  Bladder: Continent     Bladder Patient/Family Education: Yes  Bowel: Continent     Bowel Patient/Family Education: Yes  Pain Location/Orientation: Location: Buttocks  Pain Score: 0                       Hospital Pain Intervention(s): Declines  Pain Patient/Family Education: Yes  Medication Management/Safety  Injectable: Lovenox  Safe Administration:  (not attempted)  Medication Patient/Family Education Complete: Yes    10/6/24 Pt was admitted to [facility] S/P Stroke. Pt is alert and oriented. Continent of bowel and bladder. Uses urinal at HS. Pt is supervision/contact guard with transfers and ambulation. With Weakness to Right side.    10/14/24 Alert and oriented. R side weakness post stroke is improving. Lungs clear/decreased on RA. Pt has occasional moist cough. Continues on scheduled breathing treatments. Continent of bowel and bladder. Pt has been free from falls while on [facility].     Case Management:     Discharge Planning  Living Arrangements: Lives w/ Spouse/significant other  Support Systems: Self, [employer]/nima community, Spouse/significant other, Friends/neighbors, Family members, Children  Assistance Needed: possible therapy post discharge  Type of Current Residence: Private residence  Current Home Care Services: No  Patient admitted to [facility] on 10/4/24 after inpatient hospital stay for multiple foci of acute infarcts . Patient [place] with one flight of stairs to enter. Independent PTA, ambulated with a walking stick.   Readmit :COVID  , isolation d/c 9/26.Patient for discharge on 10/17, insurance review sent on 10/14. KEANU reserved . Neurology appt scheduled, however they could not get one until Jan 2025. Office put patient on cancellation wait list, will try to get him in sooner.    Is the patient  actively participating in therapies? yes  List any modifications to the treatment plan: na    Barriers Interventions   CVA, COPD, HTN, DM, hyperlipidemia Medical management and oversight, medication management,    Decreased ROM and strength, left foot drop Therapeutic exercise, therapeutic activity   Decreased balance, end ADL, transfer, gait training   Decreased cog, attention, problem solving  ST strategies, ADL/transfer/gait training         Is the patient making expected progress toward goals? yes  List any update or changes to goals: na    Medical Goals: Patient will be medically stable [facility] completion of rehab program    Weekly Team Goals:   Rehab Team Goals  ADL Team Goal: Patient will be independent with ADLs with least restrictive device upon completion of rehab program  Transfer Team Goal: Patient will be independent with transfers with least restrictive device upon completion of rehab program  Locomotion Team Goal: Patient will be independent with locomotion with least restrictive device upon completion of rehab program    Complete family training   Mod I Bed mobility, S tranfers, and S/CG mobility  Consistent S/Mod I self care  Mod I Comprehension, expression, memory, and problem solving     Health and wellness: to be able to return to [facility]     Discussion: Plan for return home with spouse with [facility] for PT, OT and nursing with progression to outpatient therapy     Anticipated Discharge Date:  Oct 21, 2024  [facility] Team Members Present:  The following team members are supervising care for this patient and were present during this Weekly Team Conference.    Dr. Munoz, DO Gail Miramontes, LAURO Woo, JOSE CRUZN, RN  Lissa Velez, PT  Rosa Calderon, OTR/L and Rosalee Dugan, OTR/L  Laurie Tovar, CCC-SLP

## 2024-10-16 LAB
GLUCOSE SERPL-MCNC: 103 MG/DL (ref 65–140)
GLUCOSE SERPL-MCNC: 277 MG/DL (ref 65–140)
GLUCOSE SERPL-MCNC: 295 MG/DL (ref 65–140)
GLUCOSE SERPL-MCNC: 384 MG/DL (ref 65–140)

## 2024-10-16 PROCEDURE — 82948 REAGENT STRIP/BLOOD GLUCOSE: CPT

## 2024-10-16 PROCEDURE — 97530 THERAPEUTIC ACTIVITIES: CPT

## 2024-10-16 PROCEDURE — 97110 THERAPEUTIC EXERCISES: CPT

## 2024-10-16 PROCEDURE — 97535 SELF CARE MNGMENT TRAINING: CPT

## 2024-10-16 PROCEDURE — 97530 THERAPEUTIC ACTIVITIES: CPT | Performed by: PHYSICAL THERAPIST

## 2024-10-16 PROCEDURE — 97116 GAIT TRAINING THERAPY: CPT | Performed by: PHYSICAL THERAPIST

## 2024-10-16 PROCEDURE — 97110 THERAPEUTIC EXERCISES: CPT | Performed by: PHYSICAL THERAPIST

## 2024-10-16 PROCEDURE — 99233 SBSQ HOSP IP/OBS HIGH 50: CPT | Performed by: STUDENT IN AN ORGANIZED HEALTH CARE EDUCATION/TRAINING PROGRAM

## 2024-10-16 PROCEDURE — 92507 TX SP LANG VOICE COMM INDIV: CPT | Performed by: NURSE PRACTITIONER

## 2024-10-16 RX ORDER — PREDNISONE 5 MG/1
5 TABLET ORAL EVERY OTHER DAY
Start: 2024-10-17

## 2024-10-16 RX ORDER — METFORMIN HYDROCHLORIDE 750 MG/1
750 TABLET, EXTENDED RELEASE ORAL
Start: 2024-10-16

## 2024-10-16 RX ORDER — PREDNISONE 5 MG/1
15 TABLET ORAL EVERY OTHER DAY
Start: 2024-10-18

## 2024-10-16 RX ORDER — ATORVASTATIN CALCIUM 40 MG/1
40 TABLET, FILM COATED ORAL EVERY EVENING
Qty: 30 TABLET | Refills: 0 | Status: SHIPPED | OUTPATIENT
Start: 2024-10-16

## 2024-10-16 RX ORDER — GUAIFENESIN 600 MG/1
600 TABLET, EXTENDED RELEASE ORAL 2 TIMES DAILY
Start: 2024-10-16

## 2024-10-16 RX ORDER — MONTELUKAST SODIUM 10 MG/1
10 TABLET ORAL
Start: 2024-10-16

## 2024-10-16 RX ADMIN — SODIUM CHLORIDE SOLN NEBU 3% 4 ML: 3 NEBU SOLN at 20:08

## 2024-10-16 RX ADMIN — METFORMIN HYDROCHLORIDE 750 MG: 750 TABLET, EXTENDED RELEASE ORAL at 17:28

## 2024-10-16 RX ADMIN — GLIPIZIDE 2.5 MG: 2.5 TABLET, EXTENDED RELEASE ORAL at 08:28

## 2024-10-16 RX ADMIN — INSULIN LISPRO 4 UNITS: 100 INJECTION, SOLUTION INTRAVENOUS; SUBCUTANEOUS at 21:37

## 2024-10-16 RX ADMIN — INSULIN LISPRO 6 UNITS: 100 INJECTION, SOLUTION INTRAVENOUS; SUBCUTANEOUS at 11:57

## 2024-10-16 RX ADMIN — ATORVASTATIN CALCIUM 40 MG: 40 TABLET, FILM COATED ORAL at 17:28

## 2024-10-16 RX ADMIN — SODIUM CHLORIDE SOLN NEBU 3% 4 ML: 3 NEBU SOLN at 08:28

## 2024-10-16 RX ADMIN — FAMOTIDINE 20 MG: 20 TABLET, FILM COATED ORAL at 17:28

## 2024-10-16 RX ADMIN — BUDESONIDE, GLYCOPYRROLATE, AND FORMOTEROL FUMARATE 2 PUFF: 160; 9; 4.8 AEROSOL, METERED RESPIRATORY (INHALATION) at 07:21

## 2024-10-16 RX ADMIN — CLOPIDOGREL 75 MG: 75 TABLET ORAL at 08:28

## 2024-10-16 RX ADMIN — GLIPIZIDE 2.5 MG: 2.5 TABLET, EXTENDED RELEASE ORAL at 17:28

## 2024-10-16 RX ADMIN — LEVALBUTEROL HYDROCHLORIDE 1.25 MG: 1.25 SOLUTION RESPIRATORY (INHALATION) at 08:28

## 2024-10-16 RX ADMIN — INSULIN LISPRO 10 UNITS: 100 INJECTION, SOLUTION INTRAVENOUS; SUBCUTANEOUS at 16:20

## 2024-10-16 RX ADMIN — GUAIFENESIN 600 MG: 600 TABLET ORAL at 08:28

## 2024-10-16 RX ADMIN — ASPIRIN 81 MG 81 MG: 81 TABLET ORAL at 08:28

## 2024-10-16 RX ADMIN — BUDESONIDE, GLYCOPYRROLATE, AND FORMOTEROL FUMARATE 2 PUFF: 160; 9; 4.8 AEROSOL, METERED RESPIRATORY (INHALATION) at 19:27

## 2024-10-16 RX ADMIN — SODIUM CHLORIDE SOLN NEBU 3% 4 ML: 3 NEBU SOLN at 13:33

## 2024-10-16 RX ADMIN — ENOXAPARIN SODIUM 40 MG: 40 INJECTION SUBCUTANEOUS at 08:28

## 2024-10-16 RX ADMIN — MONTELUKAST 10 MG: 10 TABLET, FILM COATED ORAL at 21:36

## 2024-10-16 RX ADMIN — AMLODIPINE BESYLATE 5 MG: 5 TABLET ORAL at 08:28

## 2024-10-16 RX ADMIN — LEVALBUTEROL HYDROCHLORIDE 1.25 MG: 1.25 SOLUTION RESPIRATORY (INHALATION) at 20:08

## 2024-10-16 RX ADMIN — GUAIFENESIN 600 MG: 600 TABLET ORAL at 17:28

## 2024-10-16 RX ADMIN — LISINOPRIL 20 MG: 20 TABLET ORAL at 08:28

## 2024-10-16 RX ADMIN — LEVALBUTEROL HYDROCHLORIDE 1.25 MG: 1.25 SOLUTION RESPIRATORY (INHALATION) at 13:33

## 2024-10-16 RX ADMIN — FAMOTIDINE 20 MG: 20 TABLET, FILM COATED ORAL at 08:28

## 2024-10-16 RX ADMIN — PREDNISONE 15 MG: 5 TABLET ORAL at 08:28

## 2024-10-16 RX ADMIN — ATENOLOL 25 MG: 25 TABLET ORAL at 08:28

## 2024-10-16 NOTE — PROGRESS NOTES
Progress Note - Gold Van 78 y.o. male MRN: [id number]    Unit/Bed#: [facility] 216-01 Encounter: [id number]        Subjective:   Patient seen and examined at bedside after reviewing the chart and discussing the case with the caring staff.      Patient examined at bedside.  Patient denies any acute symptoms today.  Reviewed medications with patient and patient's wife in preparation for discharge tomorrow.     Physical Exam   Vitals: Blood pressure 128/64, pulse 56, temperature 98.6 °F (37 °C), temperature source Temporal, resp. rate 18, height 6' (1.829 m), weight 70.6 kg (155 lb 10.3 oz), SpO2 92%.,Body mass index is 21.11 kg/m².  Constitutional: Patient in no acute distress.  HEENT: PERR, EOMI, MMM.  Cardiovascular: Normal rate and regular rhythm.    Pulmonary/Chest: Effort normal and breath sounds normal.   Abdomen: Soft, + BS, NT.    Assessment/Plan:  Gold Van is a(n) 78 y.o. male with CVA.     Hypertension.  Patient is on lisinopril 20 mg twice daily, amlodipine 5 mg twice daily, atenolol 25 mg daily.  Type 2 diabetes mellitus.  Hgb A1c 10.6% on 10/2/24.  Restart home glipizide 2.5 mg twice daily 10/4 and and discontinue Lantus.  Wife brought in metformin  mg daily.  SSI with accu-checks ac/hs.  Carb controlled diet.  GERD.  Patient is on famotidine 20 mg twice daily.   Chronic generalized pruritus.  Ongoing past 5-6 years.  Pt/wife reports receiving extensive workup in the past from various specialists including dermatology and allergist.  Continue alternating prednisone 5 mg and 15 mg daily.    COPD/asthma.  Shea brought from home.  Continue Singulair 10 mg nightly and Xopenox/sodium chloride neb TID.  Pt follows with [facility] pulmonology.    Pneumonia.  Sputum C&S on 10/1 positive for pseudomonas.  Patient was treated with IV cefepime and then transitioned to PO Vantin to complete 5-day course of antibiotics as recommended by pulmonology.  Continue Vantin 400 mg twice daily x 4 more doses  (thru 10/6), Mucinex 600 mg twice daily, Xopenox/sodium chloride neb TID.  Tessalon perles as needed.  Incentive spirometry.   Abnormal echocardiogram.  TTE on 10/1 with posterior wall motion abnormality.  Cardiology saw patient on acute care and recommended outpatient stress test.  Hyponatremia.  Level 134 -> 133 on 10/5/24.  Cont to monitor.    Malnutrition.  Adult Malnutrition type: Chronic illness.  Adult Degree of Malnutrition: Malnutrition of moderate degree. Malnutrition Characteristics: Weight loss, Inadequate energy, Fat loss.  Dietician following.  Pain and bowel regimen.  As per physiatry.  Patient started on Senokot S daily as needed and MiraLAX as needed 10/5/2024.  CVA.   Neurology recommended DAPT with Plavix/ASA x90 days total (~12/29/24) then ASA monotherapy.  Cont atorvastatin 40 mg daily.  Follow-up with neurology in 6 weeks.  Follow-up with cardiology for Zio patch and/or loop recorder on discharge.  Patient is receiving intensive PT OT with management as per physiatry.     Anticipated date of discharge: Thursday 10/17/2024    The patient was discussed with Dr. Hendrickson and he is in agreement with the above note.

## 2024-10-16 NOTE — NURSING NOTE
Patient supervision in room with walker. Reports no pain. Sitting in recliner for all meals. Remains continant. Continued stroke education provided. Will continue to monitor and follow plan of care.

## 2024-10-16 NOTE — ASSESSMENT & PLAN NOTE
Neuropathy in feet and lower 1/3 of the tibia bilaterally  Undiagnosed previously, but to consider in therapies  Likely related to diabetes  Consider gabapentin, however not uncomfortable at this time   13-Aug-2018 23:46

## 2024-10-16 NOTE — PROGRESS NOTES
10/16/24 1230   Pain Assessment   Pain Assessment Tool 0-10   Pain Score No Pain   Restrictions/Precautions   Precautions Bed/chair alarms;Cognitive;Fall Risk;Visual deficit;Supervision on toilet/commode   Sit to Stand   Type of Assistance Needed Supervision   Physical Assistance Level No physical assistance   Sit to Stand CARE Score 4   Exercise Tools   UE Ergometer bio dex 5 F then 3.5 B then remaining 1.5 B set at 20/target 20 actual 10-15   Theraband reviewed written HEP with pts wife: thumb extension&flexion/digit extension/flexion, intrinsics,abd.adduction,  strength   Other Exercise Tool 1 Red t band : 2x10 reps straight arm raise and then straight arm pulls written HEP provided and reviewed with wife and pt   Other Exercise Tool 2 reviewed and provided writteh weighted program with pt and wife   Other Exercise Tool 3 AROM with neck- extension with place and hold, retraction with place and hold, lateral flexion with place and hold also shoulder shrugs/rolls and pro and retraction. Multiple reps performed in each plane with wife present.   Cognition   Overall Cognitive Status WFL   Arousal/Participation Alert;Cooperative   Attention Within functional limits   Orientation Level Oriented X4   Memory Decreased recall of precautions   Following Commands Follows one step commands with increased time or repetition   Additional Activities   Additional Activities Comments functional mobility RW CS from pt room to OT room   Activity Tolerance   Activity Tolerance Patient limited by fatigue   Assessment   Treatment Assessment OT session addressed TE for generalized strength/endurance and coordination. Wife present for entire session. Pt tolerance varies with level of fatigue. Pt fatigued from previous therapy sessions this date. Increased cueing offerred for exercises. Written program provided and reviewed with wife and pt. OT provided increased educ in positonal recomm. Pt tends to keep head/neck positioned in  forward flexed posture. Explained to wife and pt long term effects of poor posture and educ in AROM for neck as well as how to position self in recliner chair to promote improved posture. Wife daryn good knowledge of same and reports she will reinforce when at home. Plan is for d/c to home tomorrow.   OT Family training done with: wife   Assessment of family training Education in [facility] with written provided and reviewed.   Prognosis Good   Problem List Decreased strength;Decreased endurance;Decreased range of motion;Impaired balance;Decreased mobility;Decreased coordination;Impaired judgement;Decreased safety awareness;Decreased cognition   Plan   Treatment/Interventions Functional transfer training;Therapeutic exercise;Endurance training;Patient/family training;Gait training;Compensatory technique education;Spoke to nursing;Family;OT   Progress Progressing toward goals   OT Therapy Minutes   OT Time In 1230   OT Time Out 1330   OT Total Time (minutes) 60   OT Mode of treatment - Individual (minutes) 60   OT Mode of treatment - Concurrent (minutes) 0   OT Mode of treatment - Group (minutes) 0   OT Mode of treatment - Co-treat (minutes) 0   OT Mode of Treatment - Total time(minutes) 60 minutes   OT Cumulative Minutes 290   Therapy Time missed   Time missed? No

## 2024-10-16 NOTE — PROGRESS NOTES
10/16/24 0650   Pain Assessment   Pain Assessment Tool 0-10   Pain Score No Pain   Restrictions/Precautions   Precautions Bed/chair alarms;Cognitive;Fall Risk;Supervision on toilet/commode   Cognition   Overall Cognitive Status WFL   Subjective   Subjective Pt reports I'm not a morning person.   Roll Left and Right   Type of Assistance Needed Independent   Physical Assistance Level No physical assistance   Roll Left and Right CARE Score 6   Sit to Lying   Type of Assistance Needed Independent   Physical Assistance Level No physical assistance   Sit to Lying CARE Score 6   Lying to Sitting on Side of Bed   Type of Assistance Needed Independent   Physical Assistance Level No physical assistance   Lying to Sitting on Side of Bed CARE Score 6   Sit to Stand   Type of Assistance Needed Supervision   Physical Assistance Level No physical assistance   Comment RW   Sit to Stand CARE Score 4   Bed-Chair Transfer   Type of Assistance Needed Supervision   Physical Assistance Level No physical assistance   Comment RW   Chair/Bed-to-Chair Transfer CARE Score 4   Car Transfer   Reason if not Attempted Environmental limitations   Car Transfer CARE Score 10   Walk 10 Feet   Type of Assistance Needed Independent   Physical Assistance Level No physical assistance   Comment RW   Walk 10 Feet CARE Score 6   Walk 50 Feet with Two Turns   Type of Assistance Needed Supervision   Physical Assistance Level No physical assistance   Comment RW   Walk 50 Feet with Two Turns CARE Score 4   Walk 150 Feet   Type of Assistance Needed Supervision   Physical Assistance Level No physical assistance   Comment RW   Walk 150 Feet CARE Score 4   Walking 10 Feet on Uneven Surfaces   Type of Assistance Needed Supervision   Physical Assistance Level No physical assistance   Comment RW   Walking 10 Feet on Uneven Surfaces CARE Score 4   Ambulation   Primary Mode of Locomotion Prior to Admission Walk   Distance Walked (feet) 150 ft   Assist Device Roller  Walker   Does the patient walk? 2. Yes   Wheel 50 Feet with Two Turns   Type of Assistance Needed Independent   Physical Assistance Level No physical assistance   Wheel 50 Feet with Two Turns CARE Score 6   Wheel 150 Feet   Type of Assistance Needed Independent   Physical Assistance Level No physical assistance   Wheel 150 Feet CARE Score 6   Wheelchair mobility   Does the patient use a wheelchair? 0. No   Type of Wheelchair Used 1. Manual   Method Right upper extremity;Left upper extremity;Right lower extremity;Left lower extremity   Curb or Single Stair   Style negotiated Single stair   Type of Assistance Needed Supervision   Physical Assistance Level No physical assistance   Comment Single HR, SPC   1 Step (Curb) CARE Score 4   4 Steps   Type of Assistance Needed Supervision   Physical Assistance Level No physical assistance   Comment Single HR, SPC   4 Steps CARE Score 4   12 Steps   Type of Assistance Needed Incidental touching   Physical Assistance Level No physical assistance   Comment CGA, single HR and SPC   12 Steps CARE Score 4   Toilet Transfer   Type of Assistance Needed Supervision   Physical Assistance Level No physical assistance   Comment Grab bar and RW   Toilet Transfer CARE Score 4   Therapeutic Interventions   Strengthening Seated LE TE   Assessment   Treatment Assessment Pt presents this a.m.  Slow to wake, but also reports that he is not a morning person.  Pt reports with ongoing proximal LE weakness, decreased coordination with increased fatigue.  Mildly MUÑOZ, however, able to perform full flight of stairs with standing rest breaks.  In need of ongoing interventions to maximize return to PLOF.  Planned discharge tomorrow with outpatient PT follow up.   Problem List Decreased strength;Decreased endurance;Impaired balance;Impaired judgement;Decreased safety awareness   PT Barriers   Physical Impairment Decreased strength;Decreased range of motion;Decreased endurance;Impaired balance;Decreased  mobility;Decreased coordination;Decreased safety awareness;Impaired vision   Functional Limitation Wheelchair management;Walking;Transfers;Standing;Stair negotiation;Ramp negotiation;Car transfers   Plan   Treatment/Interventions Functional transfer training;LE strengthening/ROM;Elevations;Therapeutic exercise;Endurance training;Cognitive reorientation;Patient/family training;Equipment eval/education;Bed mobility;Gait training   Progress Progressing toward goals   PT Therapy Minutes   PT Time In 0650   PT Time Out 0820   PT Total Time (minutes) 90   PT Mode of treatment - Individual (minutes) 90   PT Mode of treatment - Concurrent (minutes) 0   PT Mode of treatment - Group (minutes) 0   PT Mode of treatment - Co-treat (minutes) 0   PT Mode of Treatment - Total time(minutes) 90 minutes   PT Cumulative Minutes 1228     Seated LE TE as prior with 2#.  Blue t-band.    Patient remains OOB in chair, all needs in reach.  Alarm in place and activated.  Encouraged use of call bell, patient verbalizes understanding.

## 2024-10-16 NOTE — PROGRESS NOTES
Progress Note - PMR   Name: Gold Van 78 y.o. male I MRN: [id number]  Unit/Bed#: -01 I Date of Admission: 10/4/2024   Date of Service: 10/16/2024 I Hospital Day: 12     Assessment & Plan  CVA (cerebrovascular accident) ([facility])  MRI revealed multiple foci of acute infarcts in the bilateral frontoparietal cortices and subcortical white matter left > right  Placed on DAPT and statin for secondary stroke prophylaxis per neurology and follow up with Neuro in 6 weeks (DAPT for 3 months) through 1/1/2025  Etiology thought to be coagulopathy secondary to COVID infection  CT CAP completed to rule out malignancy  TTE showing 50% EF but posterior wall hypokinesis and will need cardiology follow up  Patient endorses improvement in right sided weakness. Has ongoing dysmetria and incoordination in the right nahomi body  Physical, Occupational, and Speech therapy      Type 2 diabetes mellitus with complication, without long-term current use of insulin ([facility])  Lab Results   Component Value Date    HGBA1C 10.6 (H) 10/02/2024       Recent Labs     10/15/24  1629 10/15/24  2000 10/16/24  0714 10/16/24  1134   POCGLU 182* 138 103 295*     Uncontrolled type 2 diabetes with A1C most recently of 10.6  Restarted home glipizide per IM and stopping Lantus  Wife tbrought in Home metformin XL 750mg pills  SSI and QID glucose checks  CCD    Essential hypertension  Atenolol 25 mg daily as well as amlodipine 5 mg twice daily and lisinopril 20 mg twice daily  Management per internal medicine and monitor blood pressures and therapy and on routine vitals  Hyperlipidemia  Continue statin  Sepsis due to pneumonia ([facility])  Met sepsis criteria on admission with tahcycardia, leukocytosis. Lactic acidosis. Also with acute repsiratory failure requiring CPAP-> BiPAP and eventually nasal canula  Blood cultures felt to be contaminants  CXR with left base opacity atelectasis vs pneumonia  S/P IV Ceftriaxone and azithromycin for 4 days  Sputum C&S was  positive for Pseudomonas-received cefepime/Azithromycin while inpatient, transitioning to Vantin for total of 2 more days (5 days course of treatment as recommended by pulmonology)   Pulm followed in acute care  IV steroids weaned and now on prior chronic prednisone regimen  Hyponatremia  Patient presents with hyponatremia sodium of 134 as of 10/3  Continue to monitor on biweekly labs or sooner if clinically indicated  Pruritus  Multiple years history of generalized pruritus with extensive work up in past  On alternating prednisone doses  Monitor for any changes  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HCC)  Suspected COPD exacerbation in setting of COVID infection  Initially requiring CPAP, transitioned to BiPAP, successfully weaned to room air  Appreciate input of pulmonology who followed  Received IV Solu-Medrol, transitioned to oral prednisone on 10/2  Continue robust regimen of meds for baseline COPD    COVID-19  Presented with shortness of breath and was in the hospital 9/24/24 for acute respiratory symptoms related to infection  Received remdesivir and baricitinib  Felt that strokes related COVID coagulopathy  Abnormal echocardiogram  Had TTE with posterior wall abnormalities/mild hypokinesis noted. EF 50%  Follow up with cardiology as an outpatient, would benefit from monitor and stress test  Gastroesophageal reflux disease without esophagitis  Continue pepcid  Impaired mobility and activities of daily living  Patient was evaluated by the rehabilitation team MD and an appropriate candidate for acute inpatient rehabilitation program at this time.  The patient will tolerate 3 hours/day 5 to 7 days/week of intensive physical, occupational and speech therapy in order to obtain goals for community discharge  Due to the patient's functional Compared to their baseline level of function in addition to their ongoing medical needs, the patient would benefit from daily supervision from a rehabilitation physician as well as rehabilitation  nursing to implement and adjust the medical as well as functional plan of care in order to meet the patient's goals.    Neuropathy  Neuropathy in feet and lower 1/3 of the tibia bilaterally  Undiagnosed previously, but to consider in therapies  Likely related to diabetes  Consider gabapentin, however not uncomfortable at this time  Foot drop, left  Patient states he has had issues with the foot for some time now  Presents with 1/5 ankle dorsiflexor strength on the left  Eval for bracing  Moderate protein-calorie malnutrition (HCC)  Malnutrition Findings:   Adult Malnutrition type: Chronic illness  Adult Degree of Malnutrition: Malnutrition of moderate degree  Malnutrition Characteristics: Weight loss, Inadequate energy, Fat loss                  360 Statement: Malnutrition related to inadequate energy intake as evidenced by 15#(9%) weight loss from 8/7/# to 10/4/#, subcutaneous fat loss orbital/cheek region and extremities, <75% energy intake compared to estimated needs >1 month.    BMI Findings:           Body mass index is 21.11 kg/m².       Subjective   Gold Van is a 78 y.o. male with history of hypertension, type 2 diabetes, COPD, chronic steroid use who presented to the [facility] initially on 9/29/2024 for shortness of breath. Patient with a history of COPD and asthma and recent COVID infection with hospitalization. He arrived via ambulance with a CPAP on and then required a transition to BiPAP in the ER. He was initiated on IV cefepime for suspected pneumonia and also IV steroids. He met sepsis criteria for the tachycardia and leukocytosis in the setting of dental pneumonia. He had some difficulty with coordination of his right leg during a PT session as of 10/1 and neurology evaluated the patient and recommended stroke. An MRI of the brain did note multiple acute infarcts in the bilateral frontoparietal cortices and subcortical white matter changes without evidence  of mass effect or hemorrhagic transformation. Patient was started on dual antiplatelet therapy and statin and additionally had a CT of the chest abdomen pelvis to rule out other causes of hypercoagulability, stroke, malignancy. Hypercoagulability it was thought to be related to his prior COVID infection. He was on telemetry without any issues and no atrial fibrillation noted. He did have a TTE that noted some wall motion abnormality in the posterior sections. Plan for outpatient monitor and stress test. Pulmonology also following the patient and he continued with cefepime and azithromycin and is discharging on 2 additional days for coverage of Pseudomonas. This was grown in the sputum and was recommended by pulmonology. The patient was evaluated by the Rehabilitation team and deemed an appropriate candidate for comprehensive inpatient rehabilitation and admitted to the [facility] on 10/4/2024 1:24 PM     Chief Complaint: f/u stroke and discharge planning, answering questions    Interval: Patient seen and evaluated in therapy gym.  Is fatigued cognitively as well as physically plans appropriately participating in therapy.  Seen briefly during his speech therapy session as well.  Wife is present and answered all questions about medications that she had.  Discharge instructions as well as follow-ups have been placed all discharge related activities have been completed in preparation for his discharge tomorrow.  He will be following with outpatient therapies including PT, OT and speech therapy.  They have the equipment except for the wheelchair that has been ordered and the form was filled out by myself.  No fever, chills, nausea, vomiting, cough or shortness with, diarrhea or constipation at this time.  Mood is stable, sleeping well and last bowel movement was on 10/15.    Objective :  Temp:  [98.6 °F (37 °C)] 98.6 °F (37 °C)  HR:  [56-88] 56  BP: (109-128)/(55-64) 128/64  Resp:  [18] 18  SpO2:  [92 %] 92 %  O2 Device: None  (Room air)    Functional Update:  Physical Therapy Occupational Therapy Speech Therapy   Weight Bearing Status: Weight Bearing as Tolerated  Transfers:  (close S up to cg/MIN A)  Bed Mobility: Independent  Amulation Distance (ft): 165 feet  Ambulation:  (cg/close S up to MIN A (outside surfaces))  Assistive Device for Ambulation: Roller Walker  Wheelchair Mobility Distance: 174 ft  Wheelchair Mobility: Independent  Number of Stairs: 14  Assistive Device for Stairs:  (single HR + SPC)  Stair Assistance:  (cg/close S)  Ramp: Minimal Assistance  Assistive Device for Ramp: Roller Walker  Discharge Recommendations: Home with:  DC Home with:: Family Support, First Floor Setup, Home Physical Therapy   Eating: Independent  Grooming: Supervision  Bathing: Supervision  Bathing: Supervision  Upper Body Dressing: Independent  Lower Body Dressing: Supervision  Toileting: Supervision  Tub/Shower Transfer: Incidental Touching  Toilet Transfer: Incidental Touching  Cognition: Within Defined Limits  Orientation: Person, Place, Time, Situation   Mode of Communication: Verbal  Cognition: Exceptions to WNL  Cognition: Decreased Attention  Orientation: Person, Place, Time, Situation  Swallowing: Within Defined Limits  Diet Recommendations: Regular Diet, Thin  Discharge Recommendations: Home with:  DC Home with:: Family Support, Outpatient Speech Therapy       Physical Exam  Vitals reviewed.   Constitutional:       General: He is not in acute distress.  HENT:      Head: Normocephalic and atraumatic.      Right Ear: External ear normal.      Left Ear: External ear normal.      Nose: Nose normal. No rhinorrhea.      Mouth/Throat:      Mouth: Mucous membranes are moist.      Pharynx: Oropharynx is clear.   Eyes:      General: No scleral icterus.  Cardiovascular:      Rate and Rhythm: Normal rate.   Pulmonary:      Effort: Pulmonary effort is normal. No respiratory distress.   Abdominal:      General: There is no distension.      Palpations:  Abdomen is soft.   Musculoskeletal:      Right lower leg: Edema present.      Left lower leg: Edema present.      Comments: Trace ankle edema   Skin:     General: Skin is warm and dry.      Comments: Ecchymosis and stages of healing in the bilateral lower extremities and arms   Neurological:      Mental Status: He is alert and oriented to person, place, and time.      Comments: Dysmetria not tested today, left-sided foot drop bilateral lower extremity sensory deficits mostly in the distal tibial region and through the ankle/feet   Psychiatric:         Mood and Affect: Mood normal.         Behavior: Behavior normal.           Scheduled Meds:  Current Facility-Administered Medications   Medication Dose Route Frequency Provider Last Rate    acetaminophen  650 mg Oral Q6H PRN Annie L Dagnall, PA-C      albuterol  2 puff Inhalation Q4H PRN Annie L Dagnall, PA-C      albuterol  2.5 mg Nebulization Q4H PRN Annie L Dagnall, PA-C      amLODIPine  5 mg Oral BID Annie L Dagnall, PA-C      aspirin  81 mg Oral Daily Annie L Dagnall, PA-C      atenolol  25 mg Oral Daily Annie L Dagnall, PA-C      atorvastatin  40 mg Oral QPM Annie L Dagnall, PA-C      benzonatate  100 mg Oral TID PRN Annie L Dagnall, PA-C      Budeson-Glycopyrrol-Formoterol  2 puff Inhalation Q12H Annie L Dagnall, PA-C      clopidogrel  75 mg Oral Daily Annie L Dagnall, PA-C      enoxaparin  40 mg Subcutaneous Daily Annie L Dagnall, PA-C      famotidine  20 mg Oral BID Annie L Dagnall, PA-C      glipiZIDE  2.5 mg Oral BID With Meals Annie L Dagnall, PA-C      guaiFENesin  600 mg Oral BID Annie L Dagnall, PA-C      insulin lispro  1-6 Units Subcutaneous HS Annie L Dagnall, PA-C      insulin lispro  2-12 Units Subcutaneous TID AC Annie L Dagnall, PA-C      levalbuterol  1.25 mg Nebulization TID Annie L Dagnall, PA-C      lisinopril  20 mg Oral BID Annie L Dagnall, PA-C      LORazepam  0.5 mg Oral HS PRN Annie L Dagnall, PA-C       metFORMIN  750 mg Oral Daily With Dinner Christian Hendrickson MD      montelukast  10 mg Oral HS Annie Martines PA-C      polyethylene glycol  17 g Oral Daily PRN Christian Hendrickson MD      predniSONE  15 mg Oral Every Other Day Annie Martines PA-C      predniSONE  5 mg Oral Every Other Day Annie Martines PA-C      senna-docusate sodium  1 tablet Oral HS PRN Christian Hendrickson MD      sodium chloride  4 mL Nebulization TID Annie Martines PA-C           Lab Results: I have reviewed the following results:  Results from last 7 days   Lab Units 10/14/24  0537   HEMOGLOBIN g/dL 10.1*   HEMATOCRIT % 31.2*   WBC Thousand/uL 7.94   PLATELETS Thousands/uL 225     Results from last 7 days   Lab Units 10/14/24  0537   BUN mg/dL 18   SODIUM mmol/L 134*   POTASSIUM mmol/L 4.2   CHLORIDE mmol/L 99   CREATININE mg/dL 0.86   AST U/L 10*   ALT U/L 15                Brandon Munoz,   Physical Medicine and Rehabilitation  [facility]    I have spent a total time of 55 minutes in caring for this patient on the day of the visit/encounter including Instructions for management, Patient and family education, Counseling / Coordination of care, Documenting in the medical record, and Communicating with other healthcare professionals .

## 2024-10-16 NOTE — PHYSICAL THERAPY NOTE
Quick Note:      Patient would benefit from light weight wheelchair to allow for greater independence to complete MRADLs in the home setting, given pt's fluctuating mobility status with walker during periods of increased fatigue.    Alternate mobility required for independence and to reduce falls.  Patient is able to self propel wheelchair independently.    Light weight wheelchair required due to prolonged symptoms from recent Covid infection, h/o COPD with asthma, L internal carotid artery aneurysm, ongoing respiratory insufficiencies with pulmonary nodule.    Spouse unable to manage weight of regular wheelchair.

## 2024-10-16 NOTE — PROGRESS NOTES
[facility] SLP DISCHARGE NOTE    10/16/24 8920   Pain Assessment   Pain Assessment Tool 0-10   Pain Score No Pain   Restrictions/Precautions   Precautions Bed/chair alarms;Cognitive;Fall Risk;Supervision on toilet/commode;Visual deficit   Cognitive Linguisitic Assessments   Repeatable Battery for the Assessment of Neuropsychological Status (RBANS)  RBANS completed 10/9/24     The Repeatable Battery for the Assessment of Neuropsychological Status (RBANS) is a brief, individually-administered assessment which measures attention, language, visuospatial/constructional abilities, and immediate & delayed memory. The RBANS is intended for use with adolescents to adults, ages 12 to 89 years. The following results were obtained during the administration of the assessment.     Form: A     Cognitive Domain/Subtest: Index Score: Percentile Rank: Classification:   IMMEDIATE MEMORY 94 34%ile Average        1. List Learning (22/40)        2. Story Memory (19/24)       VISUOSPATIAL/  CONSTRUCTIONAL 121 92%ile Superior        3. Figure Copy (20/20)        4. Line Orientation (18/20)       LANGUAGE 90 25%ile Average        5. Picture Naming (10/10)        6. Semantic Fluency (14/40)       ATTENTION 72 3%ile Borderline        7. Digit Span (8/16)        8. Coding (21/89)       DELAYED MEMORY 107 68%ile Average        9. List Recall (5/10)        10. List Recognition (20/20)        11. Story Recall (8/12)        12. Figure Recall (15/20)          Comprehension   Assist Devices Glasses   Visual   (macular degernation)   Comprehension (FIM) 6 - Has only MILD difficulty with complex/abstract info   Expression   Expression (FIM) 7 - Expresses complex/abstract ideas in a reasonable time w/o devices or helper.   Social Interaction   Social Interaction (FIM) 7 - Interacts appropriately without assistive device, medication or helper   Problem Solving   Problem solving (FIM) 6 - Solves complex problems BUT requires extra time   Memory    Memory (FIM) 6 - Recognizes with extra time   Speech/Language/Cognition Assessmetn   Treatment Assessment Pt pending discharge home tomorrow with his wife. He will be transitioning to outpatient therapy with recommendations  to continue speech therapy. Completed tasks today with some repetition of the direction x1-2 as needed based on level of distraction. Provided with print outs of strategies and HEP. Session completed together with patient and wife.   SLP Therapy Minutes   SLP Time In 1130   SLP Time Out 1200   SLP Total Time (minutes) 30   SLP Mode of treatment - Individual (minutes) 30   SLP Mode of treatment - Concurrent (minutes) 0   SLP Mode of treatment - Group (minutes) 0   SLP Mode of treatment - Co-treat (minutes) 0   SLP Mode of Treatment - Total time(minutes) 30 minutes   SLP Cumulative Minutes 264

## 2024-10-16 NOTE — PROGRESS NOTES
10/16/24 1030   Pain Assessment   Pain Assessment Tool 0-10   Pain Score No Pain   Restrictions/Precautions   Precautions Bed/chair alarms;Cognitive;Fall Risk;Supervision on toilet/commode   Sit to Stand   Type of Assistance Needed Supervision   Physical Assistance Level No physical assistance   Sit to Stand CARE Score 4   Toileting Hygiene   Type of Assistance Needed Supervision   Physical Assistance Level No physical assistance   Toileting Hygiene CARE Score 4   Toileting   Able to Pull Clothing down yes, up yes.   Manage Hygiene Bladder   Limitations Noted In Balance;LE Strength;Safety   Adaptive Equipment Grab Bar   Toilet Transfer   Type of Assistance Needed Supervision   Physical Assistance Level No physical assistance   Toilet Transfer CARE Score 4   Toilet Transfer   Surface Assessed Raised Toilet   Transfer Technique Standard   Limitations Noted In Balance;Safety;LE Strength   Adaptive Equipment Grab Bar;Walker   Exercise Tools   Other Exercise Tool 1 3# dowel 3x10 reps: shld flex/ext , forward row, chest press, lateral row (used 2# and completed x 10 reps) , side to side(10 then 5) then bicep curl   Other Exercise Tool 2 red t bar: 3x10 upward then with yellow t bar  downward 3x10 reps   Additional Activities   Additional Activities Comments functional mobility RW CS from pt room to OT room   Activity Tolerance   Activity Tolerance Patient tolerated treatment well   Assessment   Treatment Assessment Upon entry to room Pt questioned needing another RW;OT discussed with pt that one mobility aide is covered, phone call made to wife and she reports she will get an additional walker for pt to have one up/down stairs and not to order one through insurance. OT agreed and also showed pt pics on [employer] for RW. Pt reports he has a shower seat and wife confirmed. Wife also reporting she got a grab bar in shower and raised resin lifters for toilet. OT session then addressed safety with functional txfs and mobility,  B UE TE for generalized strength and endurance for functional activity performance. Pt tolerated session fairly, and graded as needed. Barriers include: decreased strength especially in R side s/p CVA, decreased endurance, impaired balance, decreased safety/judgement and difficulty problem solving. Pt will benefit from continued skilled OT services to address barriers and increase independence with daily tasks.   Prognosis Good   Problem List Decreased strength;Decreased endurance;Impaired balance;Decreased mobility;Decreased safety awareness   Plan   Treatment/Interventions Functional transfer training;ADL retraining;Therapeutic exercise;Endurance training;Cognitive reorientation;Patient/family training;Equipment eval/education;Gait training;Compensatory technique education;Spoke to nursing;OT   Progress Progressing toward goals   OT Therapy Minutes   OT Time In 1030   OT Time Out 1130   OT Total Time (minutes) 60   OT Mode of treatment - Individual (minutes) 60   OT Mode of treatment - Concurrent (minutes) 0   OT Mode of treatment - Group (minutes) 0   OT Mode of treatment - Co-treat (minutes) 0   OT Mode of Treatment - Total time(minutes) 60 minutes   OT Cumulative Minutes 230   Therapy Time missed   Time missed? No

## 2024-10-16 NOTE — ASSESSMENT & PLAN NOTE
Lab Results   Component Value Date    HGBA1C 10.6 (H) 10/02/2024       Recent Labs     10/15/24  1629 10/15/24  2000 10/16/24  0714 10/16/24  1134   POCGLU 182* 138 103 295*     Uncontrolled type 2 diabetes with A1C most recently of 10.6  Restarted home glipizide per IM and stopping Lantus  Wife tbrought in Home metformin XL 750mg pills  SSI and QID glucose checks  CCD

## 2024-10-16 NOTE — PLAN OF CARE
Problem: Neurological Deficit  Goal: Neurological status is stable or improving  Description: Interventions:  - Monitor and assess patient's level of consciousness, motor function, sensory function, and level of assistance needed for ADLs.   - Monitor and report changes from baseline. Collaborate with interdisciplinary team to initiate plan and implement interventions as ordered.   - Provide and maintain a safe environment.  - Consider seizure precautions.  - Consider fall precautions.  - Consider aspiration precautions.  - Consider bleeding precautions.  Outcome: Progressing     Problem: PAIN - ADULT  Goal: Verbalizes/displays adequate comfort level or baseline comfort level  Description: Interventions:  - Encourage patient to monitor pain and request assistance  - Assess pain using appropriate pain scale  - Administer analgesics based on type and severity of pain and evaluate response  - Implement non-pharmacological measures as appropriate and evaluate response  - Consider cultural and social influences on pain and pain management  - Notify physician/advanced practitioner if interventions unsuccessful or patient reports new pain  Outcome: Progressing     Problem: INFECTION - ADULT  Goal: Absence or prevention of progression during hospitalization  Description: INTERVENTIONS:  - Assess and monitor for signs and symptoms of infection  - Monitor lab/diagnostic results  - Monitor all insertion sites, i.e. indwelling lines, tubes, and drains  - Monitor endotracheal if appropriate and nasal secretions for changes in amount and color  - [place] appropriate cooling/warming therapies per order  - Administer medications as ordered  - Instruct and encourage patient and family to use good hand hygiene technique  - Identify and instruct in appropriate isolation precautions for identified infection/condition  Outcome: Progressing

## 2024-10-16 NOTE — PROGRESS NOTES
10/15/24 1430   Pain Assessment   Pain Assessment Tool 0-10   Pain Score No Pain   Restrictions/Precautions   Precautions Bed/chair alarms;Fall Risk;Supervision on toilet/commode;Cognitive   Cognitive Linguisitic Assessments   Cognitive Linquistic Quick Test (CLQT) RBANS completed 10/9/24   Comprehension   Assist Devices Glasses   Comprehension (FIM) 6 - Has only MILD difficulty with complex/abstract info   Expression   Expression (FIM) 7 - Expresses complex/abstract ideas in a reasonable time w/o devices or helper.   Social Interaction   Social Interaction (FIM) 7 - Interacts appropriately without assistive device, medication or helper   Problem Solving   Problem solving (FIM) 6 - Solves complex problems BUT requires extra time   Memory   Memory (FIM) 6 - Recognizes with extra time   Speech/Language/Cognition Assessmetn   Treatment Assessment family training completed with patient and wife together. Verbal education provided as well as written via alternating attention activity. Discussed examples of mental activity as he transitions back home. Also discussed attention at length regarding safety.   SLP Therapy Minutes   SLP Time In 1430   SLP Time Out 1500   SLP Total Time (minutes) 30   SLP Mode of treatment - Individual (minutes) 30   SLP Mode of treatment - Concurrent (minutes) 0   SLP Mode of treatment - Group (minutes) 0   SLP Mode of treatment - Co-treat (minutes) 0   SLP Mode of Treatment - Total time(minutes) 30 minutes   SLP Cumulative Minutes 234

## 2024-10-16 NOTE — CASE MANAGEMENT
CM notified by Lissa in PT, a fax came from [employer] [facility] requesting clinical information. Lissa printed out the progress notes documentation, and [facility]. CM saw in [place], the order was sent to [facility]  MELISSA [facility], spoke with Meryl, who reported she received all of the documentation. MELISSA requested the approval to be sent to [facility]. Meryl stated she sent it over to [facility]'s inbox. MELISSA sent a message through [place] to [facility] of the information being sent to them. Dunn made aware patient is discharging tomorrow am.  CM received a phone call from Dede  at [facility], stating patient 's wife wants to hold off on the wheelchair for now, she will have it delivered to their home, if she changes her mind. Dede will make notes in [place] regarding this. MELISSA will continue to follow.

## 2024-10-17 VITALS
OXYGEN SATURATION: 94 % | DIASTOLIC BLOOD PRESSURE: 62 MMHG | TEMPERATURE: 97.6 F | BODY MASS INDEX: 21.08 KG/M2 | WEIGHT: 155.65 LBS | HEART RATE: 97 BPM | SYSTOLIC BLOOD PRESSURE: 127 MMHG | HEIGHT: 72 IN | RESPIRATION RATE: 16 BRPM

## 2024-10-17 LAB
GLUCOSE SERPL-MCNC: 125 MG/DL (ref 65–140)
GLUCOSE SERPL-MCNC: 287 MG/DL (ref 65–140)

## 2024-10-17 PROCEDURE — 97530 THERAPEUTIC ACTIVITIES: CPT

## 2024-10-17 PROCEDURE — 82948 REAGENT STRIP/BLOOD GLUCOSE: CPT

## 2024-10-17 PROCEDURE — 97535 SELF CARE MNGMENT TRAINING: CPT

## 2024-10-17 PROCEDURE — 97116 GAIT TRAINING THERAPY: CPT

## 2024-10-17 PROCEDURE — 97112 NEUROMUSCULAR REEDUCATION: CPT

## 2024-10-17 RX ADMIN — GLIPIZIDE 2.5 MG: 2.5 TABLET, EXTENDED RELEASE ORAL at 08:15

## 2024-10-17 RX ADMIN — FAMOTIDINE 20 MG: 20 TABLET, FILM COATED ORAL at 08:15

## 2024-10-17 RX ADMIN — INSULIN LISPRO 6 UNITS: 100 INJECTION, SOLUTION INTRAVENOUS; SUBCUTANEOUS at 11:37

## 2024-10-17 RX ADMIN — LISINOPRIL 20 MG: 20 TABLET ORAL at 08:15

## 2024-10-17 RX ADMIN — PREDNISONE 5 MG: 5 TABLET ORAL at 08:15

## 2024-10-17 RX ADMIN — ASPIRIN 81 MG 81 MG: 81 TABLET ORAL at 08:15

## 2024-10-17 RX ADMIN — ATENOLOL 25 MG: 25 TABLET ORAL at 08:15

## 2024-10-17 RX ADMIN — LEVALBUTEROL HYDROCHLORIDE 1.25 MG: 1.25 SOLUTION RESPIRATORY (INHALATION) at 08:15

## 2024-10-17 RX ADMIN — GUAIFENESIN 600 MG: 600 TABLET ORAL at 08:15

## 2024-10-17 RX ADMIN — ENOXAPARIN SODIUM 40 MG: 40 INJECTION SUBCUTANEOUS at 08:14

## 2024-10-17 RX ADMIN — CLOPIDOGREL 75 MG: 75 TABLET ORAL at 08:15

## 2024-10-17 RX ADMIN — BUDESONIDE, GLYCOPYRROLATE, AND FORMOTEROL FUMARATE 2 PUFF: 160; 9; 4.8 AEROSOL, METERED RESPIRATORY (INHALATION) at 07:43

## 2024-10-17 RX ADMIN — AMLODIPINE BESYLATE 5 MG: 5 TABLET ORAL at 08:15

## 2024-10-17 RX ADMIN — SODIUM CHLORIDE SOLN NEBU 3% 4 ML: 3 NEBU SOLN at 08:15

## 2024-10-17 NOTE — PHYSICAL THERAPY NOTE
PT DISCHARGE SUMMARY    Patient has met max benefit from inpatient [facility] PT.  Patient independent for bed mobility; mod I-CG for transfers; supervision-min A for gait with RW on level and unlevel surfaces. Supervision-CG for stair negotiation with single HR and SPC assist. Pt's assist level varies for all tasks based on fatigue level and time of day, at time of d/c patient was at a supervision level for ambulation and stair negotiation and Mod I for transfers and bed mobility. Pt remains limited by decreased strength, decreased ROM, impaired balance, decreased endurance, decreased mobility, and decreased safety awareness. Patient continues to require occasional cues for stair negotiation sequence and technique. Multiple sessions of family training completed with patient and wife. Pt CG for car transfer per nursing. Will benefit from continued PT services post discharge.

## 2024-10-17 NOTE — OCCUPATIONAL THERAPY NOTE
Pt participates in ADLs, transfers/ mobility and BUE therex/ theract. Pt being d/cd to home today with supportive wife. OPT advised. [facility] Ongoing barriers include decreased strength and endurance on R side s/p CVA, impaired balance and decreased safety awareness/judgement. Pt has 2 RWs, a w/c, grab bars in shower, resin  for toilet seat and a SPC. D/C to home warranted for today.

## 2024-10-17 NOTE — PROGRESS NOTES
"   10/17/24 1000   Pain Assessment   Pain Assessment Tool 0-10   Pain Score No Pain   Restrictions/Precautions   Precautions Bed/chair alarms;Cognitive;Fall Risk;Supervision on toilet/commode;Visual deficit   Weight Bearing Restrictions No   ROM Restrictions No   Cognition   Overall Cognitive Status WFL   Arousal/Participation Alert;Responsive;Cooperative   Attention Within functional limits   Orientation Level Oriented X4   Memory Decreased recall of precautions   Following Commands Follows one step commands with increased time or repetition   Subjective   Subjective \"I am ready to go home\"   Roll Left and Right   Comment Pt OOB   Sit to Lying   Comment Pt OOB   Lying to Sitting on Side of Bed   Comment Pt OOB   Sit to Stand   Type of Assistance Needed Independent   Physical Assistance Level No physical assistance   Comment With RW   Sit to Stand CARE Score 6   Bed-Chair Transfer   Type of Assistance Needed Independent   Physical Assistance Level No physical assistance   Comment With RW   Chair/Bed-to-Chair Transfer CARE Score 6   Car Transfer   Reason if not Attempted Environmental limitations   Car Transfer CARE Score 10   Walk 10 Feet   Type of Assistance Needed Supervision;Verbal cues   Physical Assistance Level No physical assistance   Comment S with RW on level and unlevel surfaces   Walk 10 Feet CARE Score 4   Walk 50 Feet with Two Turns   Type of Assistance Needed Supervision;Verbal cues   Physical Assistance Level No physical assistance   Comment S with RW on level and unlevel surfaces   Walk 50 Feet with Two Turns CARE Score 4   Walk 150 Feet   Type of Assistance Needed Supervision;Verbal cues   Physical Assistance Level No physical assistance   Comment S with RW on level and unlevel surfaces   Walk 150 Feet CARE Score 4   Walking 10 Feet on Uneven Surfaces   Type of Assistance Needed Supervision;Verbal cues   Physical Assistance Level No physical assistance   Comment S with RW on level and unlevel " surfaces   Walking 10 Feet on Uneven Surfaces CARE Score 4   Ambulation   Primary Mode of Locomotion Prior to Admission Walk   Distance Walked (feet) 214 ft  (214 and 120 ft)   Assist Device Roller Walker   Gait Pattern Ataxic;Inconsistant Marsha;Decreased foot clearance;R foot drag;Shuffle   Limitations Noted In Balance;Coordination;Device Management;Heel Strike;Endurance;Posture;Safety;Strength   Provided Assistance with: Balance   Findings S with RW on level and unlevel surfaces   Does the patient walk? 2. Yes   Curb or Single Stair   Style negotiated Single stair   Type of Assistance Needed Supervision   Physical Assistance Level No physical assistance   Comment Supervision with R HR and SPC in LUE, nonreciprocal pattern   1 Step (Curb) CARE Score 4   4 Steps   Type of Assistance Needed Supervision   Physical Assistance Level No physical assistance   Comment Supervision with R HR and SPC in LUE, nonreciprocal pattern   4 Steps CARE Score 4   12 Steps   Type of Assistance Needed Supervision   Physical Assistance Level No physical assistance   Comment Supervision with R HR and SPC in LUE, nonreciprocal pattern   12 Steps CARE Score 4   Stairs   Type Stairs   # of Steps 14   Weight Bearing Precautions WBAT   Assist Devices Single Rail;Cane   Findings Supervision with R HR and SPC in LUE, nonreciprocal pattern   Picking Up Object   Type of Assistance Needed Incidental touching   Physical Assistance Level No physical assistance   Picking Up Object CARE Score 4   Toilet Transfer   Comment Not needed during session   Therapeutic Interventions   Balance Gait and transfer training   Neuromuscular Re-Education Randhawa balance test   Other Stair negotiation   Assessment   Treatment Assessment Pt agreeable to PT session this morning. No reports of pain throughout session. Pt is Mod I for transfers with RW. S for ambulation with RW level and unlevel surfaces up to 214 ft. Supervision on stairs with single HR and SPC. Gait and  transfer training for increased balance, safety, and independence with functional mobility. Performed ahuja balance test with score of 28/56, indicating a high fall risk, however improved from inital assessment.  Pt returned to room in recliner with alarms on and all needs within reach.   Problem List Decreased strength;Decreased endurance;Impaired balance;Decreased mobility;Impaired judgement;Decreased cognition;Decreased safety awareness   Barriers to Discharge Inaccessible home environment;Decreased caregiver support   PT Barriers   Physical Impairment Decreased strength;Decreased range of motion;Decreased endurance;Impaired balance;Decreased mobility;Decreased coordination;Decreased safety awareness;Impaired vision   Functional Limitation Wheelchair management;Walking;Transfers;Standing;Stair negotiation;Ramp negotiation;Car transfers   Plan   Treatment/Interventions Functional transfer training;LE strengthening/ROM;Elevations;Therapeutic exercise;Endurance training;Equipment eval/education;Bed mobility;Gait training   Progress Progressing toward goals   PT Therapy Minutes   PT Time In 1000   PT Time Out 1045   PT Total Time (minutes) 45   PT Mode of treatment - Individual (minutes) 45   PT Mode of treatment - Concurrent (minutes) 0   PT Mode of treatment - Group (minutes) 0   PT Mode of treatment - Co-treat (minutes) 0   PT Mode of Treatment - Total time(minutes) 45 minutes   PT Cumulative Minutes 1273   Therapy Time missed   Time missed? No

## 2024-10-17 NOTE — PROGRESS NOTES
10/17/24 0851   Pain Assessment   Pain Assessment Tool 0-10   Pain Score No Pain   Restrictions/Precautions   Precautions Bed/chair alarms;Cognitive;Fall Risk;Supervision on toilet/commode;Visual deficit   Weight Bearing Restrictions No   ROM Restrictions No   Oral Hygiene   Type of Assistance Needed Independent   Comment stance at sink   Oral Hygiene CARE Score 6   Grooming   Able To Comb/Brush Hair;Wash/Dry Face;Brush/Clean Teeth;Wash/Dry Hands   Limitation Noted In Safety;Strength   Shower/Bathe Self   Type of Assistance Needed Supervision;Adaptive equipment;Verbal cues   Comment Pt instr to notify this OT when he was ready to stand to complete buttock hygiene which he did not do. It is advised he have S for task.   Shower/Bathe Self CARE Score 4   Bathing   Assessed Bath Style Shower   Able to Adjust Water Temperature Yes   Able to Wash/Rinse/Dry (body part) Left Arm;Right Arm;L Upper Leg;R Upper Leg;Buttocks;L Lower Leg/Foot;R Lower Leg/Foot;Chest;Abdomen;Perineal Area   Limitations Noted in Balance;Endurance;Strength;Safety;ROM   Positioning Seated;Standing   Adaptive Equipment Shower Bars;Shower Seat;Hand Held Shower   Tub/Shower Transfer   Limitations Noted In Balance;Endurance;Safety;LE Strength   Adaptive Equipment Grab Bars;Seat with Back   Findings CGA for safety with small step over into stall   Upper Body Dressing   Type of Assistance Needed Set-up / clean-up   Physical Assistance Level No physical assistance   Upper Body Dressing CARE Score 5   Lower Body Dressing   Type of Assistance Needed Supervision;Verbal cues   Physical Assistance Level No physical assistance   Comment Sup and cues to remain seated to doff garments   Lower Body Dressing CARE Score 4   Putting On/Taking Off Footwear   Type of Assistance Needed Adaptive equipment;Supervision;Verbal cues   Putting On/Taking Off Footwear CARE Score 4   Picking Up Object   Type of Assistance Needed Incidental touching;Verbal cues;Adaptive  equipment   Picking Up Object CARE Score 4   Dressing/Undressing Clothing   Remove UB Clothes Pullover Shirt   Don UB Clothes Pullover Shirt   Remove LB Clothes Pants;Socks   Don LB Clothes Pants;Socks;Shoes   Limitations Noted In Balance;Endurance;Safety;Strength;ROM   Adaptive Equipment Sock Aide;LH [place]   Positioning Supported Sit;Standing   Sit to Stand   Type of Assistance Needed Supervision;Verbal cues   Sit to Stand CARE Score 4   Toileting Hygiene   Type of Assistance Needed Supervision   Physical Assistance Level No physical assistance   Toileting Hygiene CARE Score 4   Toileting   Able to Pull Clothing down yes, up yes.   Manage Hygiene Bladder   Limitations Noted In Balance;LE Strength;Safety;ROM   Adaptive Equipment Grab Bar   Toilet Transfer   Type of Assistance Needed Supervision   Physical Assistance Level No physical assistance   Toilet Transfer CARE Score 4   Toilet Transfer   Surface Assessed Raised Toilet   Transfer Technique Standard   Limitations Noted In Balance;Safety;LE Strength   Adaptive Equipment Grab Bar;Walker   Kitchen Mobility   Kitchen-Mobility Level Walker   Kitchen Activity Retrieve items;Transport items   Kitchen Mobility Comments Supervision recommended   Cognition   Overall Cognitive Status WFL   Arousal/Participation Alert;Cooperative   Attention Within functional limits   Orientation Level Oriented X4   Memory Decreased recall of precautions;Decreased short term memory   Following Commands Follows one step commands with increased time or repetition   Additional Activities   Additional Activities Comments fxnl mobility throughout hallways is CS with RW   Activity Tolerance   Activity Tolerance Patient tolerated treatment well   Assessment   Treatment Assessment OT session addressed ADL via shower level with focus on safe techniuqes and compensatory strategies which included EC techniuqes. Pt becomes SOB with exertion of ADL. Relieved with DBng techniques. Pt recog need for  AE for LB self care. Increased time offerred due to level of fatigue. Supervision is advised due to decreased strength and endurance on R side s/p CVA, impaired balance and decreased safety awareness/judgement. Wife has been in for FT and is able to provide needed support. reinforced need to complete B UE HEP as well as neck AROM which was reviewed previous session.  Plan is for d/c to home today.   Prognosis Good   Problem List Decreased strength;Decreased endurance;Impaired balance;Decreased mobility;Impaired judgement;Decreased cognition;Decreased safety awareness   Plan   Treatment/Interventions ADL retraining;Functional transfer training;Endurance training;Cognitive reorientation;Patient/family training;Gait training;Equipment eval/education;Compensatory technique education;Spoke to nursing;OT   Progress Discontinue OT   Discharge Recommendation   Discharge Summary Pt being d/cd to home today with supportive wife. OPT advised. Pt met OT goals at [facility]. Ongoing barriers include decreased strength and endurance on R side s/p CVA, impaired balance and decreased safety awareness/judgement. Pt has 2 RWs, a w/c, grab bars in shower, resin  for toilet seat and a SPC. D/C to home warranted for today.   OT Therapy Minutes   OT Time In 0851   OT Time Out 1000   OT Total Time (minutes) 69   OT Mode of treatment - Individual (minutes) 69   OT Mode of treatment - Concurrent (minutes) 0   OT Mode of treatment - Group (minutes) 0   OT Mode of treatment - Co-treat (minutes) 0   OT Mode of Treatment - Total time(minutes) 69 minutes   OT Cumulative Minutes 359   Therapy Time missed   Time missed? No

## 2024-10-17 NOTE — DISCHARGE SUMMARY
Discharge Summary   Gold Van 78 y.o. male MRN: [id number]  Unit/Bed#: -01 Encounter: [id number]    Admission Date: 10/4/2024     Discharge Date: 10/17/2024    Etiologic/Rehabilitation Diagnosis: Impairment of mobility, safety and Activities of Daily Living (ADLs) due to {REHAB ADMISSION INDICATION IP:[id number]}    HPI: Gold Van is a(n) 78 y.o. year old male [facility]  Patient originally presented to [facility] ED on 9/29 for acute shortness of breath.  Patient had recent hospitalization from 9/24-9/26 for COVID.  He arrived to ED on CPAP and then transferred over to [facility] for  hours.  CXR showing mild opacity in the left base which could be due to atelectasis or pneumonia.  Patient started on IV ceftriazone, azithromycin and Solu-Medrol for suspected COPD exacerbation and pneumonia.  Sputum C&S positive for pseudomonas.  Patient was switched to IV cefepime and then transitioned to PO Vantin to complete 5-day course of antibiotics as recommended by pulmonology.   On 10/1 patient evaluated by therapy who noted new onset of right sided appendicular ataxia and truncal ataxia.   Neurology consulted.  Patient loaded on ASA 325mg and Plavix 300mg followed by aspirin 81mg daily and Plavix 75mg daily.  Also started on atorvastatin 40 mg daily.  CTA head/neck showing acute or subacute cortical infarct in the left precentral gyrus, severe multifocal atherosclerotic stenosis in the cavernous and supraclinoid segments of bilateral internal carotid arteries, severe atherosclerotic stenosis in pre-PICA segments of bilateral intracranial vertebral arteries, focal moderate mid basilar artery stenosis, atherosclerotic stenosis of the cervical ICAs.  MRI brain with multiple foci of acute infarcts involving bilateral frontoparietal cortices and subcortical white matter.  Some of the infarcts noted with some appearing watershed likely due to episodes of hypotension or  hypoperfusion during his illness.  Other infarcts appear embolic but of unclear etiology.  Neurology recommended DAPT for 90 days total then ASA monotherapy as well as continuing with high intensity statin.  Neurology also recommended pan-CT to evaluate for underlying malignancy.  CT c/a/p without evidence of primary malignancy.  TTE with LVEF 50% and mild hypokinesia of the posterior wall.  Patient seen by cardiology in consultation and will need outpatient monitor and stress test.  Patient was seen by PT OT in consultation who recommended inpatient rehab services.     The medical team was managing the following medical conditions during the course of the patient's admission:     Hypertension.  Patient is on lisinopril 20 mg twice daily, amlodipine 5 mg twice daily, atenolol 25 mg daily.  Type 2 diabetes mellitus.  Hgb A1c 10.6% on 10/2/24.  Restart home glipizide 2.5 mg twice daily 10/4 and and discontinue Lantus.  Wife brought in metformin  mg daily.  SSI with accu-checks ac/hs.  Carb controlled diet.  GERD.  Patient is on famotidine 20 mg twice daily.   Chronic generalized pruritus.  Ongoing past 5-6 years.  Pt/wife reports receiving extensive workup in the past from various specialists including dermatology and allergist.  Continue alternating prednisone 5 mg and 15 mg daily.    COPD/asthma.  Rhiannoni brought from home.  Continue Singulair 10 mg nightly and Xopenox/sodium chloride neb TID (resume albuterol neb prn on discharge).  Pt follows with [facility] pulmonology.    Pneumonia.  Sputum C&S on 10/1 positive for pseudomonas.  Patient was treated with IV cefepime and then transitioned to PO Vantin to complete 5-day course of antibiotics as recommended by pulmonology.  Completed Vantin 400 mg twice daily x 4 more doses (thru 10/6), Mucinex 600 mg twice daily, Xopenox/sodium chloride neb TID.  Tessalon perles as needed.  Incentive spirometry.  On discharge resume home albuterol neb as needed.  Abnormal  echocardiogram.  TTE on 10/1 with posterior wall motion abnormality.  Cardiology saw patient on acute care and recommended outpatient stress test.  Hyponatremia.  Stable.  Level 134 -> 133 -> 134 on 10/5/24.  Cont to monitor.    Malnutrition.  Adult Malnutrition type: Chronic illness.  Adult Degree of Malnutrition: Malnutrition of moderate degree. Malnutrition Characteristics: Weight loss, Inadequate energy, Fat loss.  Dietician following.  Pain and bowel regimen.  As per physiatry.  Patient started on Senokot S daily as needed and MiraLAX as needed 10/5/2024.  CVA.   Neurology recommended DAPT with Plavix/ASA x90 days total (~12/29/24) then ASA monotherapy.  Cont atorvastatin 40 mg daily.  Follow-up with neurology in 6 weeks.  Follow-up with cardiology for Zio patch and/or loop recorder on discharge.  Patient is receiving intensive PT OT with management as per physiatry.     Procedures Performed During [facility] Admission: none    Complications: none    Physiatry Additions/Comorbidities:    Acute Rehabilitation Center Course: Patient participated in a comprehensive interdisciplinary inpatient rehabilitation program which included involvment of Rehab MD, therapies (PT, OT, and/or SLP), RN, CM, SW, dietary, and psychology services.     Significant Findings, Care, Treatment and Services Provided: Acute comprehensive interdisciplinary inpatient rehabilitation including PT, OT, SLP, RN, CM, SW, dietary, psychology, etc.    Functional Status Upon Admission to [facility]:***    Functional Status Upon Discharge from [facility]: ***    Discharge Diagnosis: Impairment of mobility, safety and Activities of Daily Living (ADLs) due to {REHAB ADMISSION INDICATION IP:[id number]}    Discharge Medications:   See after visit summary for reconciled discharge medications provided to patient and family.      Condition at Discharge: {condition:08629}     Discharge instructions/Information to patient and family:   See after visit summary for information  provided to patient and family.      Provisions for Follow-Up Care:  See after visit summary for information related to follow-up care and any pertinent home health orders.      Future Appointments   Date Time Provider Department Center   10/21/2024  7:45 AM April Johnson [facility]   1/28/2025 10:00 AM Theodora Ochoa MD NEUR[facility]       Disposition: {Discharge Disposition:93408}    Planned Readmission: No    Discharge Statement   I spent 45 minutes discharging the patient. This time was spent on the day of discharge. I had direct contact with the patient on the day of discharge. Greater than 50% of the total time was spent examining patient, answering all patient questions, arranging and discussing plan of care with patient as well as directly providing post-discharge instructions. Additional time then spent on discharge activities.    Discharge Medications:  See after visit summary for reconciled discharge medications provided to patient and family.      Facility Administered Medications Prior to Discharge:    Current Facility-Administered Medications   Medication Dose Route Frequency Provider Last Rate    acetaminophen  650 mg Oral Q6H PRN Annie L Dagnall, PA-C      albuterol  2 puff Inhalation Q4H PRN Annie L Dagnall, PA-C      albuterol  2.5 mg Nebulization Q4H PRN Annie L Dagnall, PA-C      amLODIPine  5 mg Oral BID Annie L Dagnall, PA-C      aspirin  81 mg Oral Daily Annie L Dagnall, PA-C      atenolol  25 mg Oral Daily Annie L Dagnall, PA-C      atorvastatin  40 mg Oral QPM Annie L Dagnall, PA-C      benzonatate  100 mg Oral TID PRN Annie L Dagnall, PA-C      Budeson-Glycopyrrol-Formoterol  2 puff Inhalation Q12H Anine L Dagnall, PA-C      clopidogrel  75 mg Oral Daily Annie L Dagnall, PA-C      enoxaparin  40 mg Subcutaneous Daily Annie L Dagnall, PA-C      famotidine  20 mg Oral BID Annie L Dagnall, PA-C      glipiZIDE  2.5 mg Oral BID With Meals  Annie Martines PA-C      guaiFENesin  600 mg Oral BID Annie Martines PA-C      insulin lispro  1-6 Units Subcutaneous HS Annie Martines PA-C      insulin lispro  2-12 Units Subcutaneous TID AC Annie Martines PA-C      levalbuterol  1.25 mg Nebulization TID Annie Martines PA-C      lisinopril  20 mg Oral BID Annie Martines PA-C      LORazepam  0.5 mg Oral HS PRN Annie Martines PA-C      metFORMIN  750 mg Oral Daily With Dinner Christian Hendrickson MD      montelukast  10 mg Oral HS Annie Martines PA-C      polyethylene glycol  17 g Oral Daily PRN Christian Hendrickson MD      predniSONE  15 mg Oral Every Other Day Annie Martines PA-C      predniSONE  5 mg Oral Every Other Day Annie Martines PA-C      senna-docusate sodium  1 tablet Oral HS PRN Christian Hendrickson MD      sodium chloride  4 mL Nebulization TID Annie Martines PA-C         Inhalation Q4H PRN Annie Izquierdol, PA-C      albuterol  2.5 mg Nebulization Q4H PRN Annie Izquierdol, PA-C      amLODIPine  5 mg Oral BID Annie Martines, PA-C      aspirin  81 mg Oral Daily Annie Izquierdol, PA-C      atenolol  25 mg Oral Daily Annie Izquierdol, PA-C      atorvastatin  40 mg Oral QPM Annie Izquierdol, PA-C      benzonatate  100 mg Oral TID PRN Annie Izquierdol, PA-C      Budeson-Glycopyrrol-Formoterol  2 puff Inhalation Q12H Annie Martines, PA-C      clopidogrel  75 mg Oral Daily Annie Izquierdol, PA-C      enoxaparin  40 mg Subcutaneous Daily Annie Martines, PA-C      famotidine  20 mg Oral BID Annie Martines, PA-C      glipiZIDE  2.5 mg Oral BID With Meals Annie Martines, PA-C      guaiFENesin  600 mg Oral BID Annie Martines, PA-C      insulin lispro  1-6 Units Subcutaneous HS Annie Martines, PA-C      insulin lispro  2-12 Units Subcutaneous TID AC Annie Martines, PA-C      levalbuterol  1.25 mg Nebulization TID Annie Martines, PA-C      lisinopril  20 mg Oral BID Annie Martines, PA-C      LORazepam  0.5 mg Oral HS PRN Annie Martines, PA-C      metFORMIN  750 mg Oral Daily With Dinner Monegasque Emeka, MD      montelukast  10 mg Oral HS Annie Martines, PA-C      polyethylene glycol  17 g Oral Daily PRN Monegasque Emeka, MD      predniSONE  15 mg Oral Every Other Day Annie Izquierdol, PA-C      predniSONE  5 mg Oral Every Other Day Anniesamra Izquierdol, PA-C      senna-docusate sodium  1 tablet Oral HS PRN Monegasque Emeka, MD      sodium chloride  4 mL Nebulization TID Annie Barbosamahadl, PA-C

## 2024-10-17 NOTE — NURSING NOTE
Pt discharged via family vehicle interdisciplinary team deemed safest means. All belongings sent home with Patient and home medication. All questions answered at the time of discharge.

## 2024-10-18 NOTE — PROGRESS NOTES
10/18/24 1255   Hello, [Guardian’s Name / Patient’s Name], this is [Caller Name] from [facility], and our clinical care team wanted to check on you / your child after your recent visit to the hospital. It will only take 3-5 minutes. Is this a good time?   Discharge Call Type/ Specific Diagnosis: ARC Stroke   [facility] Stroke Follow-up   ARC Stroke Follow-Up Time Frame 5 Day follow up   Call Complete   Attempted Number of Calls 1   Discharge phone call complete? Left Message

## 2024-10-18 NOTE — CASE MANAGEMENT
10/18/24  received an email from Christianne at [facility] , stating all acute rehab days are covered.

## 2024-10-21 ENCOUNTER — EVALUATION (OUTPATIENT)
Dept: PHYSICAL THERAPY | Facility: CLINIC | Age: 79
End: 2024-10-21
Payer: COMMERCIAL

## 2024-10-21 DIAGNOSIS — I63.9 CEREBROVASCULAR ACCIDENT (CVA), UNSPECIFIED MECHANISM (HCC): Primary | ICD-10-CM

## 2024-10-21 PROCEDURE — 97162 PT EVAL MOD COMPLEX 30 MIN: CPT | Performed by: PHYSICAL THERAPIST

## 2024-10-21 PROCEDURE — 97112 NEUROMUSCULAR REEDUCATION: CPT | Performed by: PHYSICAL THERAPIST

## 2024-10-21 NOTE — PROGRESS NOTES
"PT Evaluation     Today's date: 10/21/2024  Patient name: Gold Van  : 1945  MRN: [id number]  Referring provider: Annie Martines PA-C  Dx:   Encounter Diagnosis     ICD-10-CM    1. Cerebrovascular accident (CVA), unspecified mechanism (HCC)  I63.9                      Assessment    Assessment details: Pt is a 78 y.o. male referred to OPPT for CVA. Pt demo decreased B LE strength and L foot drop (which he had prior), impaired balance, impaired coordination, decreased endurance, and is considered a high fall risk. Due to above impairments, pt demo difficulty with stairs and requires use of RW for safety. Pt would benefit from skilled PT to maximize functional mobility, decrease fall risk, and improve strength, balance, and endurance in order to return to PLOF.     Goals  ST weeks  TUG improve by 3 sec or > to demo improved mobility and balance  Pt will be able to ambulate with SPC or LRAD for household distances to demo improved balance    LT weeks  Pt will use no AD for household distances to demo improved balance  Pt will use walking stick for ambulation in yard and daily walks to demo improved balance  Independent with updated HEP to self manage and maintain progress outside of PT      Plan    Planned therapy interventions: balance, neuromuscular re-education, strengthening, stretching, therapeutic activities, therapeutic exercise, coordination, gait training, home exercise program, orthotic fitting/training and orthotic management and training    Frequency: 2x week  Duration in weeks: 8  Plan of Care beginning date: 10/21/2024  Plan of Care expiration date: 2025  Treatment plan discussed with: PTA and patient      Subjective Evaluation    History of Present Illness  Mechanism of injury: Per EMR, \"Patient originally presented to [facility] ED on  for acute shortness of breath.  Patient had recent hospitalization from - for COVID.  He arrived to ED on CPAP " "and then transferred over to [facility] for  hours.  CXR showing mild opacity in the left base which could be due to atelectasis or pneumonia.  Patient started on IV ceftriazone, azithromycin and Solu-Medrol for suspected COPD exacerbation and pneumonia.  Sputum C&S positive for pseudomonas.  Patient was switched to IV cefepime and then transitioned to PO Vantin to complete 5-day course of antibiotics as recommended by pulmonology.   On 10/1 patient evaluated by therapy who noted new onset of right sided appendicular ataxia and truncal ataxia.   Neurology consulted.  Patient loaded on ASA 325mg and Plavix 300mg followed by aspirin 81mg daily and Plavix 75mg daily.  Also started on atorvastatin 40 mg daily.  CTA head/neck showing acute or subacute cortical infarct in the left precentral gyrus, severe multifocal atherosclerotic stenosis in the cavernous and supraclinoid segments of bilateral internal carotid arteries, severe atherosclerotic stenosis in pre-PICA segments of bilateral intracranial vertebral arteries, focal moderate mid basilar artery stenosis, atherosclerotic stenosis of the cervical ICAs.  MRI brain with multiple foci of acute infarcts involving bilateral frontoparietal cortices and subcortical white matter.  Some of the infarcts noted with some appearing watershed likely due to episodes of hypotension or hypoperfusion during his illness.  Other infarcts appear embolic but of unclear etiology.  Neurology recommended DAPT for 90 days total then ASA monotherapy as well as continuing with high intensity statin.  Neurology also recommended pan-CT to evaluate for underlying malignancy.  CT c/a/p without evidence of primary malignancy.  TTE with LVEF 50% and mild hypokinesia of the posterior wall.  Patient seen by cardiology in consultation and will need outpatient monitor and stress test.  Patient was seen by PT OT in consultation who recommended inpatient rehab services.\"     Pt's wife Stacia was present and " assisted with history. Went to hospital for COVID. Then went home for 2 days and oxygen level was low. Woke up with R hand and arm not working right. He went back to the hospital and had pneumonia and mini strokes. Ended up in [facility]. Was in the [facility]: 10/4-10/17 and now home. Going down is a lot easier than going up for steps. Has bars by toilet seat, has shower chair. Uses RW all the time now.     PLOF: walking stick in yard/outside, no AD in the house     Has macular degeneration in R eye (gets injection), cataract surgery in L about 6 weeks ago, may get cataract surgery in R but not any time soon, hx of L carpal tunnel surgery 8-10 weeks ago so has n/t in L hand. Feels like vision is back to baseline now, denies changes in memory and not sure why he needs speech therapy.     Balance is not good even before CVA. Did go to PT prior for balance and then was going to gym regularly 3x/week after PT until this happened.  Patient Goals  Patient goal: get back to PLOF, safe moving around and independent  Pain  No pain reported    Social Support  Steps to enter house: yes  Stairs in house: yes   Lives in: multiple-level home ([place])    Hand dominance: right  Exercise history: daily walks      Diagnostic Tests    FCE comments: Brain MRI 10/1/24: 1.  Multiple foci of acute infarcts involving bilateral frontoparietal cortices and subcortical white matter (left greater than right). No mass effect or hemorrhagic transformation.  2.  Old right centrum semiovale lacunar infarct. Mild chronic microangiopathic change.  3.  Acute or subacute sinus disease as described.    CTA head and neck 10/1/24:   CT Brain:     1.  Findings this for acute or subacute cortical infarct in the left precentral gyrus. No intracranial hemorrhage or mass effect.  2.  Chronic right centrum semiovale lacunar infarct. Mild microangiopathic change.  3.  Acute or subacute sinusitis as described.     CT Angiography:     4.  Severe multifocal  atherosclerotic stenosis in the cavernous and supraclinoid segments of bilateral internal carotid arteries.  5.  Severe atherosclerotic stenosis in pre-PICA segments of bilateral intracranial vertebral arteries.  6.  Focal moderate mid basilar artery stenosis.  7.  Atherosclerotic stenosis of the cervical ICAs, about 55% on the left and 50% on the right. No significant stenosis in the cervical vertebral arteries.  8.  No apparent intracranial aneurysm.         Objective    HR 80 bpm, SpO2 95%, BP: 118/60    Balance Test 10/21   6 Minute Walk Test (ft):    2 Minute Walk Test (ft):    FGA:    Gait Speed (m/s): 0.6 m/s with RW    5x Sit To Stand (s): Unable    TU.8 with RW          MCTSIB Number of Seconds   Feet Together, Eyes Open 30   Feet Together, Eyes Closed 5   Feet Together, Eyes Open Foam 30   Feet Together, Eyes Closed Foam NT               Coordination Left Right   Heel To Butt abnormal Abnormal    Finger To Nose     Rapid Alternating Movement     UE     LE Abnormal  Abnormal        Sensation Left Right   Kinesthesia     Light Touch intact Intact    Sharp/Dull     2 Point Discrimination         Manual Muscle Testing - Hip Left Right   Flexion 3+ 3+   Extension     Abduction     External Rotation       Manual Muscle Testing - Knee Left Right   Flexion 4 4   Extension 4 4     Manual Muscle Testing - Ankle Left Right   Doriflexion 3-, drop foot  3-   Plantarflexion       Stairs: step to pattern up/down with B U E support        Insurance: [email or web address]   Luis Miguel # of visits: pending   Expiration date: pending     FOTO: NA    Re-eval Date:     Date 10/21       Visit Count 1       FOTO        Pain In        Pain Out        Vitals             Precautions DM, HTN, COPD       Manuals                                        Neuro Re-Ed         HKM        Sidestepping         BW amb        Step ups         Amb with HT/HN          STS - raised        Static balance        Education  POC, prognosis 10 min        Ther  Ex        Nustep         Leg press         LAQ/HS curls         Resisted sidestepping                                         Ther Activity                        Gait Training                        Modalities

## 2024-10-22 ENCOUNTER — OFFICE VISIT (OUTPATIENT)
Dept: PODIATRY | Facility: CLINIC | Age: 79
End: 2024-10-22
Payer: COMMERCIAL

## 2024-10-22 VITALS
WEIGHT: 170 LBS | SYSTOLIC BLOOD PRESSURE: 114 MMHG | BODY MASS INDEX: 23.03 KG/M2 | HEART RATE: 72 BPM | HEIGHT: 72 IN | DIASTOLIC BLOOD PRESSURE: 62 MMHG

## 2024-10-22 DIAGNOSIS — B35.1 ONYCHOMYCOSIS: ICD-10-CM

## 2024-10-22 DIAGNOSIS — G62.9 NEUROPATHY: Primary | ICD-10-CM

## 2024-10-22 DIAGNOSIS — Z79.01 LONG TERM CURRENT USE OF ANTICOAGULANT: ICD-10-CM

## 2024-10-22 DIAGNOSIS — E11.8 TYPE 2 DIABETES MELLITUS WITH COMPLICATION, WITHOUT LONG-TERM CURRENT USE OF INSULIN (HCC): Chronic | ICD-10-CM

## 2024-10-22 PROCEDURE — 11721 DEBRIDE NAIL 6 OR MORE: CPT | Performed by: PODIATRIST

## 2024-10-22 PROCEDURE — 99202 OFFICE O/P NEW SF 15 MIN: CPT | Performed by: PODIATRIST

## 2024-10-22 NOTE — PROGRESS NOTES
Ambulatory Visit  Name: Gold Van      : 1945      MRN: [id number]  Encounter Provider: Gino Holloway DPM  Encounter Date: 10/22/2024   Encounter department: [facility] PODIATRY [place]    Assessment & Plan  Neuropathy         Onychomycosis       Debride mycotic nails and thin the nail plates x 10 with the use of a nail nipper manually and an electric Dremel bur was used to reduce the thickness of the nail beds and smoothed the distal aspect of the nails.  Type 2 diabetes mellitus with complication, without long-term current use of insulin ([facility])    Lab Results   Component Value Date    HGBA1C 10.6 (H) 10/02/2024            Long term current use of anticoagulant         Patient was told that the black ramone will slowly grow out and it may look worse before it looks better.  It may take as much as 6 months to be completely clear.    Discussed proper shoe gear, daily inspections of feet, and general foot health with patient. Patient has Q8  findings and is recommended for at risk foot care every 9-10 weeks.    Return in about 10 weeks (around 2024).     History of Present Illness     Gold Van is a 78 y.o. male who presents complaint of painful thick nails on both feet.  He is a diabetic who recently had a mild stroke that affected the right side of his body.  He is on Plavix for approximately the next 90 days from when it was applied.    History obtained from : patient  Review of Systems  Medical History Reviewed by provider this encounter:       Current Outpatient Medications on File Prior to Visit   Medication Sig Dispense Refill    albuterol (ACCUNEB) 1.25 MG/3ML nebulizer solution Take 1.25 mg by nebulization every 6 (six) hours as needed for wheezing      albuterol (PROVENTIL HFA,VENTOLIN HFA) 90 mcg/act inhaler Ventolin HFA 90 mcg/actuation aerosol inhaler      amLODIPine (NORVASC) 5 mg tablet Take 5 mg by mouth 2 (two) times a day      aspirin 81 mg chewable tablet Chew 1  tablet (81 mg total) daily      atenolol (TENORMIN) 25 mg tablet Take 25 mg by mouth daily      atorvastatin (LIPITOR) 40 mg tablet Take 1 tablet (40 mg total) by mouth every evening 30 tablet 0    Budeson-Glycopyrrol-Formoterol (Breztri Aerosphere) 160-9-4.8 MCG/ACT AERO Take 2 puffs by mouth Every 12 hours      clopidogrel (PLAVIX) 75 mg tablet Take 1 tablet (75 mg total) by mouth daily Do not start before October 18, 2024. 75 tablet 0    famotidine (PEPCID) 20 mg tablet Take 1 tablet (20 mg total) by mouth 2 (two) times a day      glipiZIDE (GLUCOTROL XL) 2.5 mg 24 hr tablet Take 2.5 mg by mouth 2 (two) times a day      guaiFENesin (MUCINEX) 600 mg 12 hr tablet Take 1 tablet (600 mg total) by mouth 2 (two) times a day      lisinopril (ZESTRIL) 20 mg tablet Take 20 mg by mouth 2 (two) times a day      LORazepam (ATIVAN) 0.5 mg tablet Take 0.5 mg by mouth daily at bedtime as needed      metFORMIN (GLUCOPHAGE-XR) 750 mg 24 hr tablet Take 1 tablet (750 mg total) by mouth daily with dinner      montelukast (SINGULAIR) 10 mg tablet Take 1 tablet (10 mg total) by mouth daily at bedtime      predniSONE 5 mg tablet Take 1 tablet (5 mg total) by mouth every other day      predniSONE 5 mg tablet Take 3 tablets (15 mg total) by mouth every other day       No current facility-administered medications on file prior to visit.      Social History     Tobacco Use    Smoking status: Former    Smokeless tobacco: Never    Tobacco comments:     quit about 25 years ago   Vaping Use    Vaping status: Never Used   Substance and Sexual Activity    Alcohol use: Never    Drug use: Never    Sexual activity: Yes     Partners: Female         Objective     /62 (BP Location: Right arm, Patient Position: Sitting, Cuff Size: Standard)   Pulse 72   Ht 6' (1.829 m)   Wt 77.1 kg (170 lb)   BMI 23.06 kg/m²     Physical Exam  Cardiovascular:      Pulses: Pulses are weak.           Dorsalis pedis pulses are 1+ on the right side and 1+ on the  left side.        Posterior tibial pulses are 1+ on the right side and 1+ on the left side.   Feet:      Right foot:      Skin integrity: Callus present. No ulcer, skin breakdown, erythema, warmth or dry skin.      Left foot:      Skin integrity: No ulcer, skin breakdown, erythema, warmth, callus or dry skin.       Vascular status is a faint 1/4 DP PT negative digital hair normal distal cooling immediate capillary refill bilaterally.  Capillary refill is approximately 2 to 3 seconds.    Derm nails are brittle elongated hypertrophic white discoloration with subungual debris x 10.  There is an increased thickness and the nails are approximately 1 to 2 mm.  There is loss of turgor noted bilaterally.  There was a dark line present on the proximal nail on the right hallux secondary to an old injury.    Ortho hammertoe deformities are present on the fourth and fifth digits bilaterally and there is dropfoot deformity noted on the left extremity.    Neuro light touch is in place 0.5 monofilament test was performed and it was decreased in the toe areas especially on the right side.  The sites that were tested were the plantar aspect of the hallux, plantar aspect of the third and fourth toes, submet 3, and the plantar aspect of the arch.    Diabetic Foot Exam    Patient's shoes and socks removed.    Right Foot/Ankle   Right Foot Inspection  Skin Exam: callus and callus. Skin not intact, no dry skin, no warmth, no erythema, no maceration, no abnormal color, no pre-ulcer and no ulcer.     Toe Exam: ROM and strength within normal limits and right toe deformity. No swelling, no tenderness and erythema    Sensory   Vibration: diminished  Proprioception: diminished  Monofilament testing: absent    Vascular  Capillary refills: < 3 seconds  The right DP pulse is 1+. The right PT pulse is 1+.     Left Foot/Ankle  Left Foot Inspection  Skin Exam: Skin not intact, no dry skin, no warmth, no erythema, no maceration, normal color, no  pre-ulcer, no ulcer and no callus.     Toe Exam: ROM and strength within normal limits, swelling and left toe deformity. No tenderness and no erythema.     Sensory   Vibration: diminished  Proprioception: diminished  Monofilament testing: absent    Vascular  Capillary refills: < 3 seconds  The left DP pulse is 1+. The left PT pulse is 1+.     Assign Risk Category  Deformity present  No loss of protective sensation  Weak pulses  Risk: 2    Administrative Statements   I have spent a total time of 16 minutes in caring for this patient on the day of the visit/encounter including Risks and benefits of tx options, Instructions for management, Patient and family education, Importance of tx compliance, Counseling / Coordination of care, Documenting in the medical record, Reviewing / ordering tests, medicine, procedures  , and Obtaining or reviewing history  .

## 2024-10-23 NOTE — PROGRESS NOTES
10/23/24 1122   Hello, [Guardian’s Name / Patient’s Name], this is [Caller Name] from [facility], and our clinical care team wanted to check on you / your child after your recent visit to the hospital. It will only take 3-5 minutes. Is this a good time?   Discharge Call Type/ Specific Diagnosis: ARC Stroke   ARC Stroke Follow-up   ARC Stroke Follow-Up Time Frame 5 Day follow up   Call Complete   Attempted Number of Calls 3   Discharge phone call complete? Left Message

## 2024-10-29 ENCOUNTER — OFFICE VISIT (OUTPATIENT)
Dept: PHYSICAL THERAPY | Facility: CLINIC | Age: 79
End: 2024-10-29
Payer: COMMERCIAL

## 2024-10-29 ENCOUNTER — TELEPHONE (OUTPATIENT)
Age: 79
End: 2024-10-29

## 2024-10-29 ENCOUNTER — EVALUATION (OUTPATIENT)
Dept: OCCUPATIONAL THERAPY | Facility: CLINIC | Age: 79
End: 2024-10-29
Payer: COMMERCIAL

## 2024-10-29 DIAGNOSIS — I63.9 CEREBROVASCULAR ACCIDENT (CVA), UNSPECIFIED MECHANISM (HCC): Primary | ICD-10-CM

## 2024-10-29 PROCEDURE — 97110 THERAPEUTIC EXERCISES: CPT

## 2024-10-29 PROCEDURE — 97110 THERAPEUTIC EXERCISES: CPT | Performed by: PHYSICAL THERAPIST

## 2024-10-29 PROCEDURE — 97112 NEUROMUSCULAR REEDUCATION: CPT | Performed by: PHYSICAL THERAPIST

## 2024-10-29 PROCEDURE — 97166 OT EVAL MOD COMPLEX 45 MIN: CPT

## 2024-10-29 NOTE — PROGRESS NOTES
Daily Note     Today's date: 10/29/2024  Patient name: Gold Van  : 1945  MRN: [id number]  Referring provider: Annie Martines PA-C  Dx:   Encounter Diagnosis     ICD-10-CM    1. Cerebrovascular accident (CVA), unspecified mechanism (HCC)  I63.9                      Subjective: Has been doing leg exercises at home. Has a hard time going up the stairs.       Objective: See treatment diary below      Assessment: Initiated POC which pt tolerated well. Focused on reviewing and performing HEP. Due to pt's height, LE weakness, and impaired balance, pt struggled with STS with no UE support and required 3 cushions on the chair. Patient would benefit from continued PT      Plan: Progress treatment as tolerated.       Insurance: [employer] # of visits: pending   Expiration date: pending     FOTO: NA    Re-eval Date:     Date 10/21 10/29      Visit Count 1 2      FOTO        Pain In        Pain Out        Vitals             Precautions DM, HTN, COPD       Manuals                                          Neuro Re-Ed   1 UE 6 laps       HKM        Sidestepping         BW amb        Step ups   1 UE 2 x 10  fw/lat       Amb with HT/HN          STS - raised  3 foam, no UE   5 x 2       Static balance        Education  POC, prognosis 10 min        Ther Ex        Nustep   L3, 10 min       Leg press         LAQ/HS curls         Resisted sidestepping   RTB @ ankles   5 laps                                         Ther Activity                        Gait Training                        Modalities                          Access Code: [email or web address]  URL: [email or web address]  Date: 10/29/2024  Prepared by: April Cain  - Side Stepping with Counter Support  - 1 x daily - 7 x weekly - 6 reps  - Walking March  - 1 x daily - 7 x weekly - 6 reps  - Forward Step Up with Counter Support  - 4 x weekly - 2 sets - 10 reps  - Side Stepping with Counter Support  - 1 x daily - 7 x weekly - 6  reps  - Sit to Stand  - 1 x daily - 7 x weekly - 2 sets - 10 reps

## 2024-10-29 NOTE — TELEPHONE ENCOUNTER
PT CALLED WITH WIFE ACOSTA TO DISCUSS UPCOMING APPTS. PT WAS RECENTLY SEEN IN  ED AND HAS BEEN ADVISED TO FOLLOW UP WITH NEUROLOGY FOR A STROKE.    PT WAS HOPING TO FOLLOW UP ON HIS STROKE WITH DR WOLFF. ADVISED PT THAT NEUROLOGY AND NEUROSURGERY ARE SEPARATE PRACTICES AND ALTHOUGH THERE ARE SOME SIMILARITIES BETWEEN THE TWO THEY OPERATE SEPARATELY AND DR WOLFF WOULD NOT BE ABLE TO BE SEEN FOR A NEUROLOGY HOSPITAL FOLLOW UP.    PT WAS ALSO WONDERING IF NEUROLOGY WOULD BE ABLE TO COMPLETE DR WOLFF'S 2 YEAR FOLLOW UP SINCE ALL IMAGING IN HIS  CHART IS SHARED.    ADVISED PT THAT DIFFERENT PROVIDERS ORDER DIFFERENT IMAGING/TESTS WHEN ASSESSING DIFFERENT DIAGNOSES.    ATTEMPTED TO TRANSFER PT AND WIFE TO A NSX NURSE TO BETTER EXPLAIN THE CLINICAL NECESSITY FOR FOLLOWING UP WITH NEURO AS WELL AS NSX.    INSTRUCTED PT TO LEAVE A  FOR CB IF NO ANSWER.

## 2024-10-29 NOTE — LETTER
2024    Annie Martines PA-C  [address] [place] [id number]    Patient: Gold Van   YOB: 1945   Date of Visit: 10/29/2024     Encounter Diagnosis     ICD-10-CM    1. Cerebrovascular accident (CVA), unspecified mechanism (HCC)  I63.9           Dear Dr. Martines:    Thank you for your recent referral of Gold Van. Please review the attached evaluation summary from Gold's recent visit.     Please verify that you agree with the plan of care by signing the attached order.     If you have any questions or concerns, please do not hesitate to call.     I sincerely appreciate the opportunity to share in the care of one of your patients and hope to have another opportunity to work with you in the near future.     Sincerely,    Gavin Law, OT      Referring Provider:     I certify that I have read the below Plan of Care and certify the need for these services furnished under this plan of treatment while under my care.                    Annie Martines PA-C  [address] [place] [id number]  Via In Basket        OT Evaluation     Today's date: 10/29/2024  Patient name: Gold Van  : 1945  MRN: [id number]  Referring provider: Annie Martines PA-C  Dx:   Encounter Diagnosis     ICD-10-CM    1. Cerebrovascular accident (CVA), unspecified mechanism (HCC)  I63.9                      Assessment  Impairments: abnormal coordination, abnormal or restricted ROM and impaired physical strength  Other impairment: decreased functional use  Symptom irritability: moderate    Assessment details: Patient presenting to OP OT services with a dx of CVA. Patient reports symptoms started 2024. Patient initially had symptoms checked were COVID related. Patient's last MD appointment was on 10/28/2024. Patient's next follow-up appointment with Neurology on 2025. Patient symptoms included upper extremity weakness and participation in IADLs. Patient reports right sided  "weakness. Patient is right hand dominant.    As per PT Evaluation:  Per EMR, \"Patient originally presented to [facility] ED on 9/29 for acute shortness of breath.  Patient had recent hospitalization from 9/24-9/26 for COVID.  He arrived to ED on CPAP and then transferred over to BiPAP for  hours.  CXR showing mild opacity in the left base which could be due to atelectasis or pneumonia.  Patient started on IV ceftriazone, azithromycin and Solu-Medrol for suspected COPD exacerbation and pneumonia.  Sputum C&S positive for pseudomonas.  Patient was switched to IV cefepime and then transitioned to PO Vantin to complete 5-day course of antibiotics as recommended by pulmonology.   On 10/1 patient evaluated by therapy who noted new onset of right sided appendicular ataxia and truncal ataxia.   Neurology consulted.  Patient loaded on ASA 325mg and Plavix 300mg followed by aspirin 81mg daily and Plavix 75mg daily.  Also started on atorvastatin 40 mg daily.  CTA head/neck showing acute or subacute cortical infarct in the left precentral gyrus, severe multifocal atherosclerotic stenosis in the cavernous and supraclinoid segments of bilateral internal carotid arteries, severe atherosclerotic stenosis in pre-PICA segments of bilateral intracranial vertebral arteries, focal moderate mid basilar artery stenosis, atherosclerotic stenosis of the cervical ICAs.  MRI brain with multiple foci of acute infarcts involving bilateral frontoparietal cortices and subcortical white matter.  Some of the infarcts noted with some appearing watershed likely due to episodes of hypotension or hypoperfusion during his illness.  Other infarcts appear embolic but of unclear etiology.  Neurology recommended DAPT for 90 days total then ASA monotherapy as well as continuing with high intensity statin.  Neurology also recommended pan-CT to evaluate for underlying malignancy.  CT c/a/p without evidence of primary malignancy.  TTE with LVEF " "50% and mild hypokinesia of the posterior wall.  Patient seen by cardiology in consultation and will need outpatient monitor and stress test.  Patient was seen by PT OT in consultation who recommended inpatient rehab services.\"      Pt's wife Stacia was present and assisted with history. Went to hospital for COVID. Then went home for 2 days and oxygen level was low. Woke up with R hand and arm not working right. He went back to the hospital and had pneumonia and mini strokes. Ended up in [facility]. Was in the [facility]: 10/4-10/17 and now home. Going down is a lot easier than going up for steps. Has bars by toilet seat, has shower chair. Uses RW all the time now.     Barriers to therapy: Past Medical History:  No date: Asthma  No date: Diabetes mellitus (HCC)  No date: Environmental allergies  No date: Hyperlipidemia  No date: Hypertension  07/13/2020: Macular degeneration      Comment:  right eye  No date: Orthostatic hypotension  No date: Osteopenia  No date: Pneumothorax  No date: Sinusitis  Understanding of Dx/Px/POC: good     Prognosis: good    Goals  STGs    Pt will increase  strength by 5-10#. - Not Met    Pt will increase shoulder strength by 1/2 grade. - Not Met    Independent with HEP. - Not Met    Pt will reports no more than half of current pain rate with activity. - Not Met    Pt will increase fine motor coordination as evident by an improvement in 9-hole peg test by 3 seconds. - Not Met    LTGs     Pt will increase  strength by an additional 5-10#. - Not Met    Pt will increase shoulder strength by 1-2 grade. - Not Met    Pt will increase pinch strength by 3-5#. - Not Met    Pt will report an increase in IADL participation. - Not Met    Pt will increase fine motor coordination as evident by an improvement in 9-hole peg test by 5 seconds. - Not Met        Plan  Patient would benefit from: OT eval and skilled occupational therapy  Referral necessary: Yes  Planned modality interventions: ultrasound, " unattended electrical stimulation, thermotherapy: hydrocollator packs, cryotherapy and thermotherapy: paraffin bath    Planned therapy interventions: massage, manual therapy, joint mobilization, patient education, strengthening, stretching, fine motor coordination training, home exercise program, neuromuscular re-education, self care, therapeutic exercise and therapeutic activities    Frequency: 2x week  Duration in weeks: 4  Treatment plan discussed with: patient  Plan details: Patient has presenting to OP OT services with a dx of CVA. Patient demonstrating decreased GMC, decreased strength, decreased ROM and decreased activity. Pt would benefit from continued Occupational Therapy services two times per week for 4 weeks to return to prior level of function and achieve all established goals. Thank you for the referral!          Subjective Evaluation    History of Present Illness  Mechanism of injury: S/p CVA  Pain  Current pain ratin  At best pain ratin  At worst pain ratin  Location: Generalized          Objective     Active Range of Motion   Left Shoulder   Normal active range of motion    Right Shoulder   Normal active range of motion    Left Elbow   Normal active range of motion    Right Elbow   Normal active range of motion    Left Wrist   Normal active range of motion    Right Wrist   Normal active range of motion    Left Thumb     Opposition: WNL    Right Thumb   Opposition: WNL    Strength/Myotome Testing     Left Shoulder   Normal muscle strength    Right Shoulder     Planes of Motion   Flexion: 4   Extension: 4   Abduction: 4   Adduction: 4     Left Elbow   Normal strength    Right Elbow   Flexion: 4  Extension: 4  Forearm supination: 4  Forearm pronation: 4    Left Wrist/Hand   Wrist extension: 4+  Wrist flexion: 4+  Radial deviation: 4+  Ulnar deviation: 4+     (2nd hand position)     Trial 1: 55    Thumb Strength  Key/Lateral Pinch     Trial 1: 11    Right Wrist/Hand   Wrist extension:  4  Wrist flexion: 4  Radial deviation: 4  Ulnar deviation: 4     (2nd hand position)     Trial 1: 50    Thumb Strength   Key/Lateral Pinch     Trial 1: 10    Additional Strength Details  Patient demonstrating with a decrease in wrist,  and pinch strength compared to unaffected upper extremity.     Neuro Exam:     Functional outcomes   Left 9 peg hole test: 35.67 (seconds)  Right 9 hole peg test: 29.08 (seconds)                Precautions s/p CVA  Initial Evaluation completed on: 10/29/2024  Re-Evaluation needs to be completed before: 11/29/2024  [facility]       FOTO COUNT N/A       RE-EVAL 11/29/2024       Manuals 10/29/2024                                       Neuro Re-Ed  10/29/2024       Dynavision  B/L A  Right A  Left A   32/1.88  36/1.'67  45/1.33                                               Ther Ex 10/29/2024       Thera-Band  Sh Ext  Sh Retraction  Triceps  Biceps Curl  PNF Flex 1  PNF Ext 1        Flex Bar  Twists  Bends  Bottle Caps        Digi-Flex        UBE Machine 10 Min L 1.0                                               Ther Activity 10/29/2024       Grooved Peg Board        Tension Pin Pom Pom        Sm Pegs w/ Tweezers                                Modalities 10/29/2024

## 2024-10-29 NOTE — TELEPHONE ENCOUNTER
Received a call from patient's spouse Mya stating patient is due for a 2 year follow up for his aneurysm however he recently had a CVA and questioned if Dr Upton would manage that too to eliminate multiple appts/doctors. Advised unfortunately the CVA would be managed by neurology.     She then stated patient had a lot of imaging completed while admitted and questioned if another scan is needed for the 2 yr f/u for his aneurysm.     After review MRA head was ordered however CTA head was done in the hospital. Will route to Dr Upton to see if that is sufficient.     Advised Mya we will be in touch to schedule 2 yr follow up appt and confirm whether or not more imaging is needed.     She stated an understanding and was appreciative.

## 2024-10-29 NOTE — PROGRESS NOTES
"OT Evaluation     Today's date: 10/29/2024  Patient name: Gold Van  : 1945  MRN: [id number]  Referring provider: Annie Martines PA-C  Dx:   Encounter Diagnosis     ICD-10-CM    1. Cerebrovascular accident (CVA), unspecified mechanism (HCC)  I63.9                      Assessment  Impairments: abnormal coordination, abnormal or restricted ROM and impaired physical strength  Other impairment: decreased functional use  Symptom irritability: moderate    Assessment details: Patient presenting to OP OT services with a dx of CVA. Patient reports symptoms started 2024. Patient initially had symptoms checked were COVID related. Patient's last MD appointment was on 10/28/2024. Patient's next follow-up appointment with Neurology on 2025. Patient symptoms included upper extremity weakness and participation in IADLs. Patient reports right sided weakness. Patient is right hand dominant.    As per PT Evaluation:  Per EMR, \"Patient originally presented to [facility] ED on  for acute shortness of breath.  Patient had recent hospitalization from - for COVID.  He arrived to ED on CPAP and then transferred over to [facility] for  hours.  CXR showing mild opacity in the left base which could be due to atelectasis or pneumonia.  Patient started on IV ceftriazone, azithromycin and Solu-Medrol for suspected COPD exacerbation and pneumonia.  Sputum C&S positive for pseudomonas.  Patient was switched to IV cefepime and then transitioned to PO Vantin to complete 5-day course of antibiotics as recommended by pulmonology.   On 10/1 patient evaluated by therapy who noted new onset of right sided appendicular ataxia and truncal ataxia.   Neurology consulted.  Patient loaded on ASA 325mg and Plavix 300mg followed by aspirin 81mg daily and Plavix 75mg daily.  Also started on atorvastatin 40 mg daily.  CTA head/neck showing acute or subacute cortical infarct in the left precentral gyrus, severe " "multifocal atherosclerotic stenosis in the cavernous and supraclinoid segments of bilateral internal carotid arteries, severe atherosclerotic stenosis in pre-PICA segments of bilateral intracranial vertebral arteries, focal moderate mid basilar artery stenosis, atherosclerotic stenosis of the cervical ICAs.  MRI brain with multiple foci of acute infarcts involving bilateral frontoparietal cortices and subcortical white matter.  Some of the infarcts noted with some appearing watershed likely due to episodes of hypotension or hypoperfusion during his illness.  Other infarcts appear embolic but of unclear etiology.  Neurology recommended DAPT for 90 days total then ASA monotherapy as well as continuing with high intensity statin.  Neurology also recommended pan-CT to evaluate for underlying malignancy.  CT c/a/p without evidence of primary malignancy.  TTE with LVEF 50% and mild hypokinesia of the posterior wall.  Patient seen by cardiology in consultation and will need outpatient monitor and stress test.  Patient was seen by PT OT in consultation who recommended inpatient rehab services.\"      Pt's wife Stacia was present and assisted with history. Went to hospital for COVID. Then went home for 2 days and oxygen level was low. Woke up with R hand and arm not working right. He went back to the hospital and had pneumonia and mini strokes. Ended up in [facility]. Was in the [facility]: 10/4-10/17 and now home. Going down is a lot easier than going up for steps. Has bars by toilet seat, has shower chair. Uses RW all the time now.     Barriers to therapy: Past Medical History:  No date: Asthma  No date: Diabetes mellitus (HCC)  No date: Environmental allergies  No date: Hyperlipidemia  No date: Hypertension  07/13/2020: Macular degeneration      Comment:  right eye  No date: Orthostatic hypotension  No date: Osteopenia  No date: Pneumothorax  No date: Sinusitis  Understanding of Dx/Px/POC: good     Prognosis: good    Goals  STGs    Pt " will increase  strength by 5-10#. - Not Met    Pt will increase shoulder strength by 1/2 grade. - Not Met    Independent with HEP. - Not Met    Pt will reports no more than half of current pain rate with activity. - Not Met    Pt will increase fine motor coordination as evident by an improvement in 9-hole peg test by 3 seconds. - Not Met    LTGs     Pt will increase  strength by an additional 5-10#. - Not Met    Pt will increase shoulder strength by 1-2 grade. - Not Met    Pt will increase pinch strength by 3-5#. - Not Met    Pt will report an increase in IADL participation. - Not Met    Pt will increase fine motor coordination as evident by an improvement in 9-hole peg test by 5 seconds. - Not Met        Plan  Patient would benefit from: OT eval and skilled occupational therapy  Referral necessary: Yes  Planned modality interventions: ultrasound, unattended electrical stimulation, thermotherapy: hydrocollator packs, cryotherapy and thermotherapy: paraffin bath    Planned therapy interventions: massage, manual therapy, joint mobilization, patient education, strengthening, stretching, fine motor coordination training, home exercise program, neuromuscular re-education, self care, therapeutic exercise and therapeutic activities    Frequency: 2x week  Duration in weeks: 4  Treatment plan discussed with: patient  Plan details: Patient has presenting to OP OT services with a dx of CVA. Patient demonstrating decreased GMC, decreased strength, decreased ROM and decreased activity. Pt would benefit from continued Occupational Therapy services two times per week for 4 weeks to return to prior level of function and achieve all established goals. Thank you for the referral!          Subjective Evaluation    History of Present Illness  Mechanism of injury: S/p CVA  Pain  Current pain ratin  At best pain ratin  At worst pain ratin  Location: Generalized          Objective     Active Range of Motion   Left  Shoulder   Normal active range of motion    Right Shoulder   Normal active range of motion    Left Elbow   Normal active range of motion    Right Elbow   Normal active range of motion    Left Wrist   Normal active range of motion    Right Wrist   Normal active range of motion    Left Thumb     Opposition: WNL    Right Thumb   Opposition: WNL    Strength/Myotome Testing     Left Shoulder   Normal muscle strength    Right Shoulder     Planes of Motion   Flexion: 4   Extension: 4   Abduction: 4   Adduction: 4     Left Elbow   Normal strength    Right Elbow   Flexion: 4  Extension: 4  Forearm supination: 4  Forearm pronation: 4    Left Wrist/Hand   Wrist extension: 4+  Wrist flexion: 4+  Radial deviation: 4+  Ulnar deviation: 4+     (2nd hand position)     Trial 1: 55    Thumb Strength  Key/Lateral Pinch     Trial 1: 11    Right Wrist/Hand   Wrist extension: 4  Wrist flexion: 4  Radial deviation: 4  Ulnar deviation: 4     (2nd hand position)     Trial 1: 50    Thumb Strength   Key/Lateral Pinch     Trial 1: 10    Additional Strength Details  Patient demonstrating with a decrease in wrist,  and pinch strength compared to unaffected upper extremity.     Neuro Exam:     Functional outcomes   Left 9 peg hole test: 35.67 (seconds)  Right 9 hole peg test: 29.08 (seconds)                Precautions s/p CVA  Initial Evaluation completed on: 10/29/2024  Re-Evaluation needs to be completed before: 11/29/2024  Insurance: [employer] Rep       FOTO COUNT N/A       RE-EVAL 11/29/2024       Manuals 10/29/2024                                       Neuro Re-Ed  10/29/2024       Dynavision  B/L A  Right A  Left A   32/1.88  36/1.'67  45/1.33                                               Ther Ex 10/29/2024       Thera-Band  Sh Ext  Sh Retraction  Triceps  Biceps Curl  PNF Flex 1  PNF Ext 1        Flex Bar  Twists  Bends  Bottle Caps        Digi-Flex        UBE Machine 10 Min L 1.0                                                Ther Activity 10/29/2024       Grooved Peg Board        Tension Pin Pom Pom        Sm Pegs w/ Miroslava                                Modalities 10/29/2024

## 2024-10-30 ENCOUNTER — OFFICE VISIT (OUTPATIENT)
Dept: PULMONOLOGY | Facility: CLINIC | Age: 79
End: 2024-10-30
Payer: COMMERCIAL

## 2024-10-30 VITALS
SYSTOLIC BLOOD PRESSURE: 110 MMHG | RESPIRATION RATE: 18 BRPM | HEIGHT: 72 IN | HEART RATE: 69 BPM | BODY MASS INDEX: 21.67 KG/M2 | TEMPERATURE: 97.8 F | WEIGHT: 160 LBS | OXYGEN SATURATION: 95 % | DIASTOLIC BLOOD PRESSURE: 54 MMHG

## 2024-10-30 DIAGNOSIS — J18.9 PNEUMONIA OF BOTH LOWER LOBES DUE TO INFECTIOUS ORGANISM: ICD-10-CM

## 2024-10-30 DIAGNOSIS — J43.1 PANLOBULAR EMPHYSEMA (HCC): ICD-10-CM

## 2024-10-30 DIAGNOSIS — J44.89 COPD WITH ASTHMA (HCC): Primary | ICD-10-CM

## 2024-10-30 PROCEDURE — 99213 OFFICE O/P EST LOW 20 MIN: CPT

## 2024-10-30 NOTE — ASSESSMENT & PLAN NOTE
-CT Chest 10/2/24 with bibasilar consolidation left greater than right  -Sputum culture 10/1 positive for Pseudomonas treated with IV cefepime and p.o. Vantin  -No residual symptoms.  Lungs are clear on exam today

## 2024-10-30 NOTE — PROGRESS NOTES
Pulmonary Follow Up Note  Gold Van 79 y.o. male MRN: [id number]  10/30/2024    Assessment/Plan:    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HCC)  -PFTs in 7/2024 with moderate obstruction and + BD responsiveness, FEV1 50%  -He also has moderate pnlobular emphysema in CT imaging  -S/p hospitalization for exacerbation secondary to COVID infection and pneumonia  -Symptoms resolved, no signs of residual pneumonia  -Lungs are clear on exam today with SpO2 95% on RA    Plan:  -Continue Breztri twice daily, albuterol PRN, and Singulair nightly  -UTD vaccinations  -Follow-up in 6 months or sooner if needed    Pneumonia of both lower lobes due to infectious organism  -CT Chest 10/2/24 with bibasilar consolidation left greater than right  -Sputum culture 10/1 positive for Pseudomonas treated with IV cefepime and p.o. Vantin  -No residual symptoms.  Lungs are clear on exam today        Diagnoses and all orders for this visit:    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HCC)    Panlobular emphysema (HCC)    Pneumonia of both lower lobes due to infectious organism          Return in about 6 months (around 4/30/2025).      History of Present Illness     Chief Complaint:   Chief Complaint   Patient presents with    Follow-up       Patient ID: Gold is a 79 y.o. y.o. male has a past medical history of Asthma, Diabetes mellitus (HCC), Environmental allergies, Hyperlipidemia, Hypertension, Macular degeneration (07/13/2020), Orthostatic hypotension, Osteopenia, Pneumothorax, and Sinusitis.      10/30/2024  HPI: Gold Van is a 79 y.o. male with a PMH of asthma/COPD, spontaneous right-sided pneumothorax in April 2024 requiring chest tube, DM2, environmental allergens, HTN, former tobacco abuse who presents to the office for a hospital follow-up visit. He was hospitalized 9/24 - 9/26 for COVID-19 tracheobronchitis.  Presented again on 9/29 with worsening shortness of breath, fatigue, and confusion.  He was kept in the hospital until 10/4 and treated for COPD/asthma  exacerbation secondary to pneumonia. Sputum C&S on 10/1 positive for pseudomonas. Patient was treated with IV cefepime and then transitioned to PO Vantin to complete 5-day course of antibiotics While in the hospital he also was found to have a CVA. Discharged to rehab. He was last seen in the office in August. Maintained on Breztri and PRN albuterol.     Patient returns today.  Discharged from rehab 1 and half weeks ago.  He is feeling better.  Just gets a little short of breath with exercise.  Has occasional cough with clear mucus.  No wheezing, chest pain/chest tightness, or lower extremity swelling.  He has been using his Breztri twice daily, and albuterol nebs 1-2 times daily.  He is going to outpatient PT/OT.  He is ambulating with a walker.      Review of Systems   Constitutional:  Negative for activity change, appetite change, chills, fever and unexpected weight change.   HENT:  Negative for congestion, ear pain, postnasal drip, rhinorrhea, sneezing, sore throat and trouble swallowing.    Respiratory:  Positive for cough and shortness of breath. Negative for chest tightness and wheezing.    Cardiovascular:  Negative for chest pain, palpitations and leg swelling.   Musculoskeletal:  Negative for myalgias.   Allergic/Immunologic: Negative.    Neurological:  Negative for headaches.       Historical Information   Past Medical History:   Diagnosis Date    Asthma     Diabetes mellitus (HCC)     Environmental allergies     Hyperlipidemia     Hypertension     Macular degeneration 07/13/2020    right eye    Orthostatic hypotension     Osteopenia     Pneumothorax     Sinusitis      Past Surgical History:   Procedure Laterality Date    CARPAL TUNNEL RELEASE Left 08/2024    CATARACT EXTRACTION Left 07/2024    COLONOSCOPY      KNEE CARTILAGE SURGERY Right 1964    VASECTOMY      WISDOM TOOTH EXTRACTION      x4     Family History   Problem Relation Age of Onset    Asthma Mother     Emphysema Mother     Cancer Father      Asthma Brother     Cancer Brother        Smoking history: He reports that he has quit smoking. He has never used smokeless tobacco.    Occupational History:     Immunization History   Administered Date(s) Administered    COVID-19 MODERNA VACC 0.5 ML IM 01/25/2021, 02/22/2021, 10/24/2021, 05/01/2022    COVID-19 Moderna Vac BIVALENT 12 Yr+ IM 0.5 ML 10/21/2022    COVID-19 Moderna mRNA Vaccine 12 Yr+ 50 mcg/0.5 mL (Spikevax) 10/23/2023       Meds/Allergies     Current Outpatient Medications:     albuterol (ACCUNEB) 1.25 MG/3ML nebulizer solution, Take 1.25 mg by nebulization every 6 (six) hours as needed for wheezing, Disp: , Rfl:     albuterol (PROVENTIL HFA,VENTOLIN HFA) 90 mcg/act inhaler, Ventolin HFA 90 mcg/actuation aerosol inhaler, Disp: , Rfl:     amLODIPine (NORVASC) 5 mg tablet, Take 5 mg by mouth 2 (two) times a day, Disp: , Rfl:     aspirin 81 mg chewable tablet, Chew 1 tablet (81 mg total) daily, Disp: , Rfl:     atenolol (TENORMIN) 25 mg tablet, Take 25 mg by mouth daily, Disp: , Rfl:     atorvastatin (LIPITOR) 40 mg tablet, Take 1 tablet (40 mg total) by mouth every evening, Disp: 30 tablet, Rfl: 0    Budeson-Glycopyrrol-Formoterol (Breztri Aerosphere) 160-9-4.8 MCG/ACT AERO, Take 2 puffs by mouth Every 12 hours, Disp: , Rfl:     clopidogrel (PLAVIX) 75 mg tablet, Take 1 tablet (75 mg total) by mouth daily Do not start before October 18, 2024., Disp: 75 tablet, Rfl: 0    famotidine (PEPCID) 20 mg tablet, Take 1 tablet (20 mg total) by mouth 2 (two) times a day, Disp: , Rfl:     glipiZIDE (GLUCOTROL XL) 2.5 mg 24 hr tablet, Take 2.5 mg by mouth 2 (two) times a day, Disp: , Rfl:     guaiFENesin (MUCINEX) 600 mg 12 hr tablet, Take 1 tablet (600 mg total) by mouth 2 (two) times a day, Disp: , Rfl:     lisinopril (ZESTRIL) 20 mg tablet, Take 20 mg by mouth 2 (two) times a day, Disp: , Rfl:     LORazepam (ATIVAN) 0.5 mg tablet, Take 0.5 mg by mouth daily at bedtime as needed, Disp: , Rfl:     metFORMIN  (GLUCOPHAGE-XR) 750 mg 24 hr tablet, Take 1 tablet (750 mg total) by mouth daily with dinner, Disp: , Rfl:     montelukast (SINGULAIR) 10 mg tablet, Take 1 tablet (10 mg total) by mouth daily at bedtime, Disp: , Rfl:     predniSONE 5 mg tablet, Take 1 tablet (5 mg total) by mouth every other day, Disp: , Rfl:     predniSONE 5 mg tablet, Take 3 tablets (15 mg total) by mouth every other day, Disp: , Rfl:     Allergies:   Allergies   Allergen Reactions    Ciprofloxacin Shortness Of Breath    Sulfamethoxazole Shortness Of Breath    Trimethoprim Shortness Of Breath    Dexamethasone Other (See Comments)    Triamcinolone Other (See Comments)    Bactrim [Sulfamethoxazole-Trimethoprim] Fever     Fever of 107         Vitals:  Vitals:    10/30/24 1336   BP: 110/54   BP Location: Right arm   Patient Position: Sitting   Cuff Size: Standard   Pulse: 69   Resp: 18   Temp: 97.8 °F (36.6 °C)   TempSrc: Temporal   SpO2: 95%   Weight: 72.6 kg (160 lb)   Height: 6' (1.829 m)     Oxygen Therapy  SpO2: 95 %  .  Wt Readings from Last 3 Encounters:   10/30/24 72.6 kg (160 lb)   10/22/24 77.1 kg (170 lb)   10/04/24 70.6 kg (155 lb 10.3 oz)     Body mass index is 21.7 kg/m².    Physical Exam  Vitals and nursing note reviewed.   Constitutional:       General: He is not in acute distress.     Appearance: Normal appearance. He is well-developed.   Cardiovascular:      Rate and Rhythm: Normal rate and regular rhythm.      Heart sounds: Normal heart sounds. No murmur heard.  Pulmonary:      Effort: Pulmonary effort is normal. No respiratory distress.      Breath sounds: Normal breath sounds. No decreased breath sounds, wheezing, rhonchi or rales.   Musculoskeletal:         General: No swelling.      Right lower leg: No edema.      Left lower leg: No edema.   Psychiatric:         Mood and Affect: Mood and affect normal.         Behavior: Behavior normal. Behavior is cooperative.           Labs: I have personally reviewed pertinent lab  "results.  Lab Results   Component Value Date    WBC 7.94 10/14/2024    HGB 10.1 (L) 10/14/2024    HCT 31.2 (L) 10/14/2024    MCV 94 10/14/2024     10/14/2024     Lab Results   Component Value Date    GLUCOSE 179 (H) 04/14/2015    CALCIUM 9.1 10/14/2024     04/14/2015    K 4.2 10/14/2024    CO2 27 10/14/2024    CL 99 10/14/2024    BUN 18 10/14/2024    CREATININE 0.86 10/14/2024     No results found for: \"IGE\"  Lab Results   Component Value Date    ALT 15 10/14/2024    AST 10 (L) 10/14/2024    ALKPHOS 43 10/14/2024    BILITOT 0.6 08/12/2014       Studies:    Imaging and other studies: I have personally reviewed pertinent reports and I have personally reviewed pertinent films in PACS     CT Chest, abdomen, pelvis 10/2/24  LUNGS:  Patchy consolidation at the left greater than right posterior/basilar lower lobes.  Findings on the left are new from April 2024 comparison, this may be related to scarring/atelectasis, an infectious infiltrate not excluded.  Mild emphysematous changes.  No tracheal or endobronchial lesion.    CT chest 4/20/2024  Moderate panlobular emphysema.  Mild dependent atelectasis in the right lung.  Large right pneumothorax.    Chest x-ray 5/6/2024  Lungs are clear.  No pneumothorax or pleural effusion.    EKG, Pathology, and Other Studies: I have personally reviewed pertinent reports.        Pulmonary function testing: none prior to review    Pulmonary Functions Testing Results: 7/23/2024    FEV1/FVC ratio 50%    FEV1 50% predicted  FVC 75% predicted  (+) response to bronchodilators   % predicted   % predicted  DLCO corrected for hemoglobin 55 % predicted    Impression: Moderate obstructive airflow defect on spirometry.  Positive bronchodilator response.  Air trapping as indicated by the lung volumes.  Moderate decrease in diffusing capacity.  Flow volume loop consistent with obstruction.      A 6-minute walk test was performed on room air, the patient did not have clinical " hypoxia.  There was a normal heart rate response to exercise.

## 2024-10-30 NOTE — ASSESSMENT & PLAN NOTE
-PFTs in 7/2024 with moderate obstruction and + BD responsiveness, FEV1 50%  -He also has moderate pnlobular emphysema in CT imaging  -S/p hospitalization for exacerbation secondary to COVID infection and pneumonia  -Symptoms resolved, no signs of residual pneumonia  -Lungs are clear on exam today with SpO2 95% on RA    Plan:  -Continue Breztri twice daily, albuterol PRN, and Singulair nightly  -UTD vaccinations  -Follow-up in 6 months or sooner if needed

## 2024-11-03 PROBLEM — A41.9 SEPSIS DUE TO PNEUMONIA (HCC): Status: RESOLVED | Noted: 2021-03-24 | Resolved: 2024-11-03

## 2024-11-03 PROBLEM — J18.9 SEPSIS DUE TO PNEUMONIA (HCC): Status: RESOLVED | Noted: 2021-03-24 | Resolved: 2024-11-03

## 2024-11-04 ENCOUNTER — OFFICE VISIT (OUTPATIENT)
Dept: PHYSICAL THERAPY | Facility: CLINIC | Age: 79
End: 2024-11-04
Payer: COMMERCIAL

## 2024-11-04 ENCOUNTER — OFFICE VISIT (OUTPATIENT)
Dept: OCCUPATIONAL THERAPY | Facility: CLINIC | Age: 79
End: 2024-11-04
Payer: COMMERCIAL

## 2024-11-04 DIAGNOSIS — I63.9 CEREBROVASCULAR ACCIDENT (CVA), UNSPECIFIED MECHANISM (HCC): Primary | ICD-10-CM

## 2024-11-04 PROCEDURE — 97530 THERAPEUTIC ACTIVITIES: CPT

## 2024-11-04 PROCEDURE — 97110 THERAPEUTIC EXERCISES: CPT | Performed by: PHYSICAL THERAPIST

## 2024-11-04 PROCEDURE — 97110 THERAPEUTIC EXERCISES: CPT

## 2024-11-04 PROCEDURE — 97112 NEUROMUSCULAR REEDUCATION: CPT | Performed by: PHYSICAL THERAPIST

## 2024-11-04 NOTE — PROGRESS NOTES
"Daily Note     Today's date: 2024  Patient name: Gold Van  : 1945  MRN: [id number]  Referring provider: Annie Martines PA-C  Dx:   Encounter Diagnosis     ICD-10-CM    1. Cerebrovascular accident (CVA), unspecified mechanism (HCC)  I63.9                      Subjective: No new complaints. Felt fine after last session.       Objective: See treatment diary below      Assessment: Focused on LE strengthening this session which pt tolerated well. He had some difficulty with HS curls due to wounds and thin skin around his legs where the bolster was hitting him. Patient would benefit from continued PT      Plan: Progress treatment as tolerated.  Plan to trial SOL[employer] NV for balance training.      Insurance: [employer] # of visits: pending   Expiration date: pending     FOTO: NA    Re-eval Date:     Date 10/21 10/29 11/4     Visit Count 1 2 3     FOTO        Pain In        Pain Out        Vitals             Precautions DM, HTN, COPD       Manuals                                          Neuro Re-Ed         HKM  1 UE 6 laps       Sidestepping         BW amb        Step ups   1 UE 2 x 10  fw/lat       Amb with HT/HN          STS - raised  3 foam, no UE   5 x 2       Static balance   FTEC level 30\" x 2    FAEC foam 30\" x 3     Education  POC, prognosis 10 min        Ther Ex        Nustep   L3, 10 min  L3, 15 min     Leg press    2 x 10, 80#     LAQ/HS curls    44# ext 2 x 10   44# flex 2 x 10      Resisted sidestepping   RTB @ ankles   5 laps                                         Ther Activity                        Gait Training                        Modalities                          Access Code: [email or web address]  URL: [email or web address]  Date: 10/29/2024  Prepared by: April Johnson    Exercises  - Side Stepping with Counter Support  - 1 x daily - 7 x weekly - 6 reps  - Walking March  - 1 x daily - 7 x weekly - 6 reps  - Forward Step Up with Counter Support  - 4 x weekly - 2 sets - " 10 reps  - Side Stepping with Counter Support  - 1 x daily - 7 x weekly - 6 reps  - Sit to Stand  - 1 x daily - 7 x weekly - 2 sets - 10 reps

## 2024-11-04 NOTE — PROGRESS NOTES
Daily Note     Today's date: 2024  Patient name: Gold Van  : 1945  MRN: [id number]  Referring provider: Annie Martines PA-C  Dx:   Encounter Diagnosis     ICD-10-CM    1. Cerebrovascular accident (CVA), unspecified mechanism (HCC)  I63.9                      Subjective: I thought he said I wouldn't need much OT.      Objective: See treatment diary below      Assessment: Tolerated treatment well. Patient demonstrated fatigue post treatment, exhibited good technique with therapeutic exercises, and would benefit from continued OT. Pt demonstrated wrist/forearm weakness as well as fatigue.       Plan: Continue per plan of care.  Progress treatment as tolerated.        FOTO COUNT N/A           RE-EVAL 2024           Manuals 10/29/2024  2024                                                                 Neuro Re-Ed  10/29/2024  2024         Dynavision  B/L A  Right A  Left A    32/1.88  36/1.'67  45/1.33                                                                                 Ther Ex 10/29/2024  2024         Thera-Band  Sh Ext  Sh Retraction  Triceps  Biceps Curl  PNF Flex 1  PNF Ext 1             Flex Bar  Twists  Bends  Bottle Caps   Green  X 20  X 20  X 20         Digi-Flex    Blue x 20         UBE Machine 10 Min L 1.0  5F/5B L 3.0          Finger abd    Blue RB   x 20                                                                 Ther Activity 10/29/2024  2024         Grooved Peg Board    R 3:28  L 4:45         Tension Pin Pom Pom    BTP         Sm Pegs w/ Tweezers                                                       Modalities 10/29/2024  2024

## 2024-11-04 NOTE — ASSESSMENT & PLAN NOTE
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 Former smoker     Imaging:  CTA head/neck 10/1/24: 1.  Findings this for acute or subacute cortical infarct in the left precentral gyrus. No intracranial hemorrhage or mass effect. 2.  Chronic right centrum semiovale lacunar infarct. Mild microangiopathic change. 3.  Acute or subacute sinusitis as described. 4.  Severe multifocal atherosclerotic stenosis in the cavernous and supraclinoid segments of bilateral internal carotid arteries. 5.  Severe atherosclerotic stenosis in pre-PICA segments of bilateral intracranial vertebral arteries. 6.  Focal moderate mid basilar artery stenosis. 7.  Atherosclerotic stenosis of the cervical ICAs, about 55% on the left and 50% on the right. No significant stenosis in the cervical vertebral arteries. 8.  No apparent intracranial aneurysm.  MRI brain 10/1/24: 1.  Multiple foci of acute infarcts involving bilateral frontoparietal cortices and subcortical white matter (left greater than right). No mass effect or hemorrhagic transformation. 2.  Old right centrum semiovale lacunar infarct. Mild chronic microangiopathic change. 3.  Acute or subacute sinus disease as described.    Plan:  Reviewed images with patient and wife. While the aneurysm is difificult to visualize, I do not appreciate any growth.  No neurosurgical invention recommended.  He would be at high risk of stroke if intervention pursued given the severe intracranial stenoses that he has.  Discussed natural history of aneurysms and risk of rupture as well as aneurysms in the cavernous segment and concern for carotid cavernous fistula and the associated symptoms.  Discussed  genetic component to aneurysms as well as modifiable risk factors such as smoking and HTN.  Continue to follow with neurology.  Given stability over the last several years as well as patient age and other medical comorbidities, no further follow-up recommended.    Reviewed red flag s/s.  Follow up as needed. Call with any questions or concerns.

## 2024-11-06 ENCOUNTER — APPOINTMENT (OUTPATIENT)
Dept: PHYSICAL THERAPY | Facility: CLINIC | Age: 79
End: 2024-11-06
Payer: COMMERCIAL

## 2024-11-06 ENCOUNTER — OFFICE VISIT (OUTPATIENT)
Dept: NEUROSURGERY | Facility: CLINIC | Age: 79
End: 2024-11-06
Payer: COMMERCIAL

## 2024-11-06 ENCOUNTER — APPOINTMENT (OUTPATIENT)
Dept: OCCUPATIONAL THERAPY | Facility: CLINIC | Age: 79
End: 2024-11-06
Payer: COMMERCIAL

## 2024-11-06 VITALS
DIASTOLIC BLOOD PRESSURE: 70 MMHG | OXYGEN SATURATION: 96 % | WEIGHT: 170 LBS | SYSTOLIC BLOOD PRESSURE: 136 MMHG | TEMPERATURE: 98.4 F | HEART RATE: 88 BPM | BODY MASS INDEX: 23.03 KG/M2 | RESPIRATION RATE: 18 BRPM | HEIGHT: 72 IN

## 2024-11-06 DIAGNOSIS — I67.1 ANEURYSM OF CAVERNOUS PORTION OF LEFT INTERNAL CAROTID ARTERY: Primary | ICD-10-CM

## 2024-11-06 PROCEDURE — 99215 OFFICE O/P EST HI 40 MIN: CPT | Performed by: PHYSICIAN ASSISTANT

## 2024-11-06 NOTE — PROGRESS NOTES
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 Former smoker     Imaging:  CTA head/neck 10/1/24: 1.  Findings this for acute or subacute cortical infarct in the left precentral gyrus. No intracranial hemorrhage or mass effect. 2.  Chronic right centrum semiovale lacunar infarct. Mild microangiopathic change. 3.  Acute or subacute sinusitis as described. 4.  Severe multifocal atherosclerotic stenosis in the cavernous and supraclinoid segments of bilateral internal carotid arteries. 5.  Severe atherosclerotic stenosis in pre-PICA segments of bilateral intracranial vertebral arteries. 6.  Focal moderate mid basilar artery stenosis. 7.  Atherosclerotic stenosis of the cervical ICAs, about 55% on the left and 50% on the right. No significant stenosis in the cervical vertebral arteries. 8.  No apparent intracranial aneurysm.  MRI brain 10/1/24: 1.  Multiple foci of acute infarcts involving bilateral frontoparietal cortices and subcortical white matter (left greater than right). No mass effect or hemorrhagic transformation. 2.  Old right centrum semiovale lacunar infarct. Mild chronic microangiopathic change. 3.  Acute or subacute sinus disease as described.    Plan:  Reviewed images with patient and wife. While the aneurysm is difificult to visualize, I do not appreciate any growth.  No neurosurgical invention  recommended.  He would be at high risk of stroke if intervention pursued given the severe intracranial stenoses that he has.  Discussed natural history of aneurysms and risk of rupture as well as aneurysms in the cavernous segment and concern for carotid cavernous fistula and the associated symptoms.  Discussed genetic component to aneurysms as well as modifiable risk factors such as smoking and HTN.  Continue to follow with neurology.  Given stability over the last several years as well as patient age and other medical comorbidities, no further follow-up recommended.    Reviewed red flag s/s.  Follow up as needed. Call with any questions or concerns.            History of Present Illness   79 year old gentleman seen for 2 year follow up with 5 mm left cavernous ICA aneurysm.  This was incidentally noted on a workup of pulsatile tinnitus in 2021.  Denies family history of aneurysm.  Former smoker.  BP controlled. He was recently admitted for COVID PNA then suffered a stroke (multi foci infarcts), unsure if related to hypotension/hypoperfusion given severity of his illness with significant bilateral intracranial stenoses vs embolic. Patient completed rehab and is now at home with OP PT. He has recovered from the stroke.  Other than overall weakness, he has no new neurologic complaints.  Baseline left foot drop.      Review of Systems   Constitutional:  Negative for fatigue.   HENT:  Tinnitus: has had for years.    Eyes: Negative.  Visual disturbance: macular degemneration/ blurry vision.   Respiratory: Negative.     Cardiovascular: Negative.    Gastrointestinal:  Negative for nausea and vomiting.   Endocrine: Negative.    Genitourinary: Negative.    Musculoskeletal: Negative.  Gait problem: uses walker.   Skin: Negative.    Allergic/Immunologic: Negative.    Neurological:  Positive for weakness (generalized weakness, building strength, PT 2x a week). Negative for dizziness, speech difficulty, numbness and headaches.    Hematological: Negative.    Psychiatric/Behavioral: Negative.       I have personally reviewed the MA's review of systems and made changes as necessary.    Past Medical History   Past Medical History:   Diagnosis Date    Asthma     Diabetes mellitus (HCC)     Environmental allergies     Hyperlipidemia     Hypertension     Macular degeneration 07/13/2020    right eye    Orthostatic hypotension     Osteopenia     Pneumothorax     Sinusitis      Past Surgical History:   Procedure Laterality Date    CARPAL TUNNEL RELEASE Left 08/2024    CATARACT EXTRACTION Left 07/2024    COLONOSCOPY      KNEE CARTILAGE SURGERY Right 1964    VASECTOMY      WISDOM TOOTH EXTRACTION      x4     Family History   Problem Relation Age of Onset    Asthma Mother     Emphysema Mother     Cancer Father     Asthma Brother     Cancer Brother      Current Outpatient Medications on File Prior to Visit   Medication Sig Dispense Refill    albuterol (ACCUNEB) 1.25 MG/3ML nebulizer solution Take 1.25 mg by nebulization every 6 (six) hours as needed for wheezing      albuterol (PROVENTIL HFA,VENTOLIN HFA) 90 mcg/act inhaler Ventolin HFA 90 mcg/actuation aerosol inhaler      amLODIPine (NORVASC) 5 mg tablet Take 5 mg by mouth 2 (two) times a day      aspirin 81 mg chewable tablet Chew 1 tablet (81 mg total) daily      atenolol (TENORMIN) 25 mg tablet Take 25 mg by mouth daily      atorvastatin (LIPITOR) 40 mg tablet Take 1 tablet (40 mg total) by mouth every evening 30 tablet 0    Budeson-Glycopyrrol-Formoterol (Breztri Aerosphere) 160-9-4.8 MCG/ACT AERO Take 2 puffs by mouth Every 12 hours      clopidogrel (PLAVIX) 75 mg tablet Take 1 tablet (75 mg total) by mouth daily Do not start before October 18, 2024. 75 tablet 0    famotidine (PEPCID) 20 mg tablet Take 1 tablet (20 mg total) by mouth 2 (two) times a day      glipiZIDE (GLUCOTROL XL) 2.5 mg 24 hr tablet Take 2.5 mg by mouth 2 (two) times a day      guaiFENesin (MUCINEX) 600 mg 12 hr tablet Take 1  tablet (600 mg total) by mouth 2 (two) times a day      lisinopril (ZESTRIL) 20 mg tablet Take 20 mg by mouth 2 (two) times a day      LORazepam (ATIVAN) 0.5 mg tablet Take 0.5 mg by mouth if needed      metFORMIN (GLUCOPHAGE-XR) 750 mg 24 hr tablet Take 1 tablet (750 mg total) by mouth daily with dinner      montelukast (SINGULAIR) 10 mg tablet Take 1 tablet (10 mg total) by mouth daily at bedtime      Multiple Vitamins-Minerals (Multivitamin Adults) TABS Take 1 tablet by mouth daily      predniSONE 5 mg tablet Take 1 tablet (5 mg total) by mouth every other day      predniSONE 5 mg tablet Take 3 tablets (15 mg total) by mouth every other day       No current facility-administered medications on file prior to visit.     Allergies   Allergen Reactions    Ciprofloxacin Shortness Of Breath    Sulfamethoxazole Shortness Of Breath    Trimethoprim Shortness Of Breath    Dexamethasone Other (See Comments)    Triamcinolone Other (See Comments)    Bactrim [Sulfamethoxazole-Trimethoprim] Fever     Fever of 107      Social History     Tobacco Use    Smoking status: Former    Smokeless tobacco: Never    Tobacco comments:     quit about 25 years ago   Vaping Use    Vaping status: Never Used   Substance and Sexual Activity    Alcohol use: Never    Drug use: Never    Sexual activity: Yes     Partners: Female     Objective     /70 (BP Location: Left arm, Patient Position: Sitting, Cuff Size: Adult)   Pulse 88   Temp 98.4 °F (36.9 °C) (Temporal)   Resp 18   Ht 6' (1.829 m)   Wt 77.1 kg (170 lb)   SpO2 96%   BMI 23.06 kg/m²     Physical Exam  Vitals reviewed.   Constitutional:       General: He is awake.      Appearance: Normal appearance.   HENT:      Head: Normocephalic and atraumatic.   Eyes:      Extraocular Movements: EOM normal.      Conjunctiva/sclera: Conjunctivae normal.      Pupils: Pupils are equal, round, and reactive to light.   Cardiovascular:      Rate and Rhythm: Normal rate.   Pulmonary:      Effort:  Pulmonary effort is normal.   Skin:     General: Skin is warm and dry.   Neurological:      Mental Status: He is alert and oriented to person, place, and time.      Coordination: Finger-Nose-Finger Test normal.      Deep Tendon Reflexes:      Reflex Scores:       Bicep reflexes are 2+ on the right side and 2+ on the left side.       Brachioradialis reflexes are 2+ on the right side and 2+ on the left side.       Patellar reflexes are 2+ on the right side and 2+ on the left side.  Psychiatric:         Attention and Perception: Attention and perception normal.         Mood and Affect: Mood and affect normal.         Speech: Speech normal.         Behavior: Behavior normal. Behavior is cooperative.         Thought Content: Thought content normal.         Cognition and Memory: Cognition and memory normal.         Judgment: Judgment normal.       Neurologic Exam     Mental Status   Oriented to person, place, and time.   Oriented to year and month.   Follows 2 step commands.   Attention: normal. Concentration: normal.   Speech: speech is normal   Level of consciousness: alert  Knowledge: good. Able to perform simple calculations.   Able to name object.     Cranial Nerves     CN III, IV, VI   Pupils are equal, round, and reactive to light.  Extraocular motions are normal.   Right pupil: Shape: regular. Reactivity: brisk. Consensual response: intact.   Left pupil: Shape: regular. Reactivity: brisk. Consensual response: intact.   CN III: no CN III palsy  CN VI: no CN VI palsy  Nystagmus: none   Ophthalmoparesis: none  Upgaze: normal  Downgaze: normal  Conjugate gaze: present    CN V   Facial sensation intact.     CN VII   Facial expression full, symmetric.     CN VIII   CN VIII normal.     CN XI   Right trapezius strength: normal  Left trapezius strength: normal    CN XII   CN XII normal.     Motor Exam   Muscle bulk: normal  Overall muscle tone: normal  Right arm pronator drift: absent  Left arm pronator drift: absent  BUE  - /biceps 5/5, triceps 4+/5  BLE - 5/5 except right DF 4/5, left DF 4-/5     Sensory Exam   Light touch normal.     Gait, Coordination, and Reflexes     Coordination   Finger to nose coordination: normal    Tremor   Resting tremor: absent  Intention tremor: absent  Action tremor: absent    Reflexes   Right brachioradialis: 2+  Left brachioradialis: 2+  Right biceps: 2+  Left biceps: 2+  Right patellar: 2+  Left patellar: 2+  Right Allison: absent  Left Allison: absent  In wheelchair       I personally reviewed the following image studies in PACS and associated radiology reports: CTA, MRA. My interpretation of the radiology images/reports is: as above.    Administrative Statements   I have spent a total time of 40 minutes in caring for this patient on the day of the visit/encounter including Diagnostic results, Risks and benefits of tx options, Instructions for management, Patient and family education, Risk factor reductions, Impressions, Counseling / Coordination of care, Documenting in the medical record, Reviewing / ordering tests, medicine, procedures  , and Obtaining or reviewing history  .

## 2024-11-07 ENCOUNTER — OFFICE VISIT (OUTPATIENT)
Dept: PHYSICAL THERAPY | Facility: CLINIC | Age: 79
End: 2024-11-07
Payer: COMMERCIAL

## 2024-11-07 ENCOUNTER — OFFICE VISIT (OUTPATIENT)
Dept: OCCUPATIONAL THERAPY | Facility: CLINIC | Age: 79
End: 2024-11-07
Payer: COMMERCIAL

## 2024-11-07 DIAGNOSIS — I63.9 CEREBROVASCULAR ACCIDENT (CVA), UNSPECIFIED MECHANISM (HCC): Primary | ICD-10-CM

## 2024-11-07 PROCEDURE — 97112 NEUROMUSCULAR REEDUCATION: CPT

## 2024-11-07 PROCEDURE — 97112 NEUROMUSCULAR REEDUCATION: CPT | Performed by: PHYSICAL THERAPIST

## 2024-11-07 PROCEDURE — 97110 THERAPEUTIC EXERCISES: CPT | Performed by: PHYSICAL THERAPIST

## 2024-11-07 NOTE — PROGRESS NOTES
"Daily Note     Today's date: 2024  Patient name: Gold Van  : 1945  MRN: [id number]  Referring provider: Annie Martines PA-C  Dx:   Encounter Diagnosis     ICD-10-CM    1. Cerebrovascular accident (CVA), unspecified mechanism (HCC)  I63.9                      Subjective: Felt fine after last session. Was not sore.       Objective: See treatment diary below      Assessment: Trialed SOLO this session which pt tolerated well. He was challenged with all dynamic balance without UE support/AD. He was very challenged with bw amb and required VC to widen KADIE, increase step stride B, and to keep upward gaze. During hurdles, pt demo R LE circumduction but could correct with VC. Patient would benefit from continued PT      Plan: Progress treatment as tolerated.       Insurance: [employer] # of visits: pending   Expiration date: pending     FOTO: NA    Re-eval Date:     Date 10/21 10/29 11/4 11/7    Visit Count 1 2 3 4    FOTO        Pain In        Pain Out        Vitals             Precautions DM, HTN, COPD       Manuals                                          Neuro Re-Ed     SOLO     HKM  1 UE 6 laps   30' x 6     Sidestepping         Hurdles    Fw/lat 3 laps     BW amb    30' x 2    Step ups   1 UE 2 x 10  fw/lat 6\"       Amb with HT/HN      30' x 4 ea     STS - raised  3 foam, no UE   5 x 2       Static balance   FTEC level 30\" x 2    FAEC foam 30\" x 3     Education  POC, prognosis 10 min        Ther Ex        Nustep   L3, 10 min  L3, 15 min L3, 10 min     Leg press    2 x 10, 80#     LAQ/HS curls    44# ext 2 x 10   44# flex 2 x 10      Resisted sidestepping   RTB @ ankles   5 laps                                         Ther Activity                        Gait Training                        Modalities                          Access Code: [email or web address]  URL: [email or web address]  Date: 10/29/2024  Prepared by: April Cain  - Side Stepping with Counter Support  - " 1 x daily - 7 x weekly - 6 reps  - Walking March  - 1 x daily - 7 x weekly - 6 reps  - Forward Step Up with Counter Support  - 4 x weekly - 2 sets - 10 reps  - Side Stepping with Counter Support  - 1 x daily - 7 x weekly - 6 reps  - Sit to Stand  - 1 x daily - 7 x weekly - 2 sets - 10 reps

## 2024-11-07 NOTE — PROGRESS NOTES
Daily Note     Today's date: 2024  Patient name: Gold Van  : 1945  MRN: [id number]  Referring provider: Annie Martines PA-C  Dx:   Encounter Diagnosis     ICD-10-CM    1. Cerebrovascular accident (CVA), unspecified mechanism (HCC)  I63.9                POC expires Auth Status Total   Visits  Start date  Expiration date PT/OT + Visit Limit? Co-Insurance    Required 10 10/29 12/4/24 10 $35 Co-pay for both pt and ot                                           Visit/Unit Tracking  AUTH Status: Required Date 10/29 11/4 11/7            Visits  Authed: 10 Used 1 (IE) 1 1             Remaining  9 8 7              PLAN OF CARE START: 10/29/24  PLAN OF CARE END: 2025  PROGRESS NOTE DUE: 24 (SCHEDULED ON [facility])  FREQUENCY: 2x week for 4 weeks  PRECAUTIONS balance deficit   DIAGNOSIS: Cerebral Vascular Accident   VISITS: 3          Subjective: Pt reports he went to neurologist yesterday for aneurysm check in that was found two and half years ago. Pt reports that neurology reports aneurysm is not a concern. Pt reports he has appointment in January with neurologist to follow up with stroke.       Objective: See treatment diary below    NMRE:  *Small pegboard with color pegs positioned raised to R of pt with pegs positioned to L side of pt for functional reach and PNF movements of RUE focusing on coordination. Pt had challenges utilizing tweezers to place pegs and down graded to utilizing pincer grasp.     *Green flexbar pronation/supination bends and bottle cap twists x20 reps for exercises as muscle priming for thereapeutic activities and motor recovery     *Blue resistance pin donning and doffing pom poms onto blue power wed for FMC, pinch strength, and motor coordination     *Rotating squigz for in hand manipulation and donning/doffing on table table positioned to R of patient with squigz to L of pt with 2lb wrist weight focusing on repetitive motions, proprioceptive input and R shoulder  strength for motor recovery.       Assessment: Tolerated treatment well. Today's session focused on RUE strength, pinch strength and FMC with coordination. Pt demonstrated slight challenges with FMC and coordination dropping items throughout session. Pt would benefit from continued occupational therapy sessions.       Plan: Continue per plan of care.  Progress treatment as tolerated.        RE-EVAL 11/29/2024           Manuals 10/29/2024  11/4/2024  11/7/24                                                               Neuro Re-Ed  10/29/2024  11/4/2024  11/7/24       Dynavision  B/L A  Right A  Left A    32/1.88  36/1.'67  45/1.33            Small Peg board with PNF movements      Completed         Tension Pom Pom       BRP         PNF patterns with squigz and wrist weight       Completed                                    Ther Ex 10/29/2024  11/4/2024  11/7/24       Thera-Band  Sh Ext  Sh Retraction  Triceps  Biceps Curl  PNF Flex 1  PNF Ext 1             Flex Bar  Twists  Bends  Bottle Caps   Green  X 20  X 20  X 20  Green   X 20  X 20   X 20        Digi-Flex    Blue x 20         UBE Machine 10 Min L 1.0  5F/5B L 3.0          Finger abd    Blue RB   x 20                                                                 Ther Activity 10/29/2024  11/4/2024         Grooved Peg Board    R 3:28  L 4:45         Tension Pin Pom Pom    BTP         Sm Pegs w/ Tweezers                                                       Modalities 10/29/2024  11/4/2024

## 2024-11-11 ENCOUNTER — APPOINTMENT (OUTPATIENT)
Dept: OCCUPATIONAL THERAPY | Facility: CLINIC | Age: 79
End: 2024-11-11
Payer: COMMERCIAL

## 2024-11-11 ENCOUNTER — APPOINTMENT (OUTPATIENT)
Dept: PHYSICAL THERAPY | Facility: CLINIC | Age: 79
End: 2024-11-11
Payer: COMMERCIAL

## 2024-11-13 ENCOUNTER — OFFICE VISIT (OUTPATIENT)
Dept: OCCUPATIONAL THERAPY | Facility: CLINIC | Age: 79
End: 2024-11-13
Payer: COMMERCIAL

## 2024-11-13 ENCOUNTER — OFFICE VISIT (OUTPATIENT)
Dept: PHYSICAL THERAPY | Facility: CLINIC | Age: 79
End: 2024-11-13
Payer: COMMERCIAL

## 2024-11-13 DIAGNOSIS — I63.9 CEREBROVASCULAR ACCIDENT (CVA), UNSPECIFIED MECHANISM (HCC): Primary | ICD-10-CM

## 2024-11-13 PROCEDURE — 97112 NEUROMUSCULAR REEDUCATION: CPT

## 2024-11-13 PROCEDURE — 97110 THERAPEUTIC EXERCISES: CPT

## 2024-11-13 NOTE — PROGRESS NOTES
"Daily Note     Today's date: 2024  Patient name: Gold Van  : 1945  MRN: [id number]  Referring provider: Annie Martines PA-C  Dx:   Encounter Diagnosis     ICD-10-CM    1. Cerebrovascular accident (CVA), unspecified mechanism (HCC)  I63.9           Start Time: 0945  Stop Time: 1030  Total time in clinic (min): 45 minutes    Subjective: Patient reports he is doing well.  Patient states he has been using his SPC instead of his RW.  Patient entering clinic with SPC.      Objective: See treatment diary below      Assessment: Tolerated treatment well.   Patient participated in skilled PT session focused on strength, balance, gait, and endurance.  Patient demonstrates improved gait with SPC, but continues with mild L foot drop.  Patient continues to be challenged in SOLO with dynamic balance activities.  Patient inquired about getting a brace for his foot drop.  Advised patient to discuss with his doctor.  Patient would continue to benefit from skilled PT interventions to address deficits with strength, balance, gait, and endurance. Patient demonstrated fatigue post treatment      Plan: Continue per plan of care.      Insurance: [employer] # of visits: pending   Expiration date: pending     FOTO: NA    Re-eval Date:     Date 10/21 10/29 11/4 11/7 11/13   Visit Count 1 2 3 4 5   FOTO        Pain In        Pain Out        Vitals             Precautions DM, HTN, COPD       Manuals                                          Neuro Re-Ed     SOLO  SOLO   HKM  1 UE 6 laps   30' x 6  30' 6x   Sidestepping         Hurdles    Fw/lat 3 laps     BW amb    30' x 2 30\" 2x    Step ups   1 UE 2 x 10  fw/lat 6\"       Amb with HT/HN      30' x 4 ea  30' 4x ea   STS - raised  3 foam, no UE   5 x 2       Static balance   FTEC level 30\" x 2    FAEC foam 30\" x 3     Education  POC, prognosis 10 min        Ther Ex        Nustep   L3, 10 min  L3, 15 min L3, 10 min  L3 x 10 min   Leg press    2 x 10, 80#  " 80# 2x10  90# 2x10   LAQ/HS curls    44# ext 2 x 10   44# flex 2 x 10   44# ext 2x10  55# flex 2x10   Resisted sidestepping   RTB @ ankles   5 laps                                         Ther Activity                        Gait Training                        Modalities                          Access Code: [email or web address]  URL: [email or web address]  Date: 10/29/2024  Prepared by: April Cain  - Side Stepping with Counter Support  - 1 x daily - 7 x weekly - 6 reps  - Walking March  - 1 x daily - 7 x weekly - 6 reps  - Forward Step Up with Counter Support  - 4 x weekly - 2 sets - 10 reps  - Side Stepping with Counter Support  - 1 x daily - 7 x weekly - 6 reps  - Sit to Stand  - 1 x daily - 7 x weekly - 2 sets - 10 reps

## 2024-11-13 NOTE — PROGRESS NOTES
Daily Note     Today's date: 2024  Patient name: Gold Van  : 1945  MRN: [id number]  Referring provider: Annie Martines PA-C  Dx:   Encounter Diagnosis     ICD-10-CM    1. Cerebrovascular accident (CVA), unspecified mechanism (HCC)  I63.9                POC expires Auth Status Total   Visits  Start date  Expiration date PT/OT + Visit Limit? Co-Insurance    Required 10 10/29 12/4/24 10 $35 Co-pay for both pt and ot                                           Visit/Unit Tracking  AUTH Status: Required Date 10/29 11/4 11/7 11/13           Visits  Authed: 10 Used 1 (IE) 2 3 4            Remaining  9 8 7 6             PLAN OF CARE START: 10/29/24  PLAN OF CARE END: 2025  PROGRESS NOTE DUE: 24 (SCHEDULED ON [facility])  FREQUENCY: 2x week for 4 weeks  PRECAUTIONS balance deficit   DIAGNOSIS: Cerebral Vascular Accident   VISITS: 4          Subjective: What I really want to work on is my strength, all this little stuff I dont have a problem with.    Objective: See treatment diary below. Pt engaged in skilled OT this date with focus to UE strength/endurance, proximal stability, GMC/GMS, in hand manipulation, functional reach, midline recognition, sustained grasp, /VM skills, and overall activity tolerance/endurance, for increased safety and engagement in ADL/IADL activities.     Thera Exc: Pt engaged in UEB this date while seated for 5 prograde/5 retrograde min L 2.0 with focus to UE strength/endurance, GMC/GMS, sustained grasp, ROM, cardiovascular endurance, and activity tolerance/engagement.    Neuro: With 2# CW applied to RUE and seated EOM with no back support, pt engaged in card matching board on elevated slant board on tabletop with encouraged cross body reaching to end range. Drops noted x 2 due to velcro being in different location.    Pt then moving to wbing into RUE, while completing parquetry block puzzle x 1 with L hand and crossing body to reach target on elevated slant board.  Pt completed 75% of task with min verbal cues for form and optimal benefit. Terminated due to timing. Drops noted x 1 due to velcro.    Assessment: Tolerated treatment well. Pt would benefit from continued occupational therapy sessions.       Plan: Continue per plan of care.  Progress treatment as tolerated.        RE-EVAL 11/29/2024           Manuals 10/29/2024  11/4/2024  11/7/24  11/13/24                                                             Neuro Re-Ed  10/29/2024  11/4/2024  11/7/24  11/13/24     Dynavision  B/L A  Right A  Left A    32/1.88  36/1.'67  45/1.33            Small Peg board with PNF movements      Completed         Tension Pom Pom       BRP         PNF patterns with squigz and wrist weight       Completed         WBing       Parquetry Block x 1     Dynamic Task       2# CW w/ matching card board     Ther Ex 10/29/2024  11/4/2024  11/7/24  11/13/24     Thera-Band  Sh Ext  Sh Retraction  Triceps  Biceps Curl  PNF Flex 1  PNF Ext 1             Flex Bar  Twists  Bends  Bottle Caps   Green  X 20  X 20  X 20  Green   X 20  X 20   X 20        Digi-Flex    Blue x 20         UBE Machine 10 Min L 1.0  5F/5B L 3.0   5F/5B min L 2.0 seated      Finger abd    Blue RB   x 20                                                                 Ther Activity 10/29/2024  11/4/2024    11/13/24     Grooved Peg Board    R 3:28  L 4:45         Tension Pin Pom Pom    BTP         Sm Pegs w/ Tweezers                                                       Modalities 10/29/2024  11/4/2024    11/13/24

## 2024-11-15 NOTE — PROGRESS NOTES
Daily Note     Today's date: 11/15/2024  Patient name: Gold Van  : 1945  MRN: [id number]  Referring provider: Annie Martines PA-C  Dx:   Encounter Diagnosis     ICD-10-CM    1. Cerebrovascular accident (CVA), unspecified mechanism (HCC)  I63.9                      Subjective: You noticed I have deficits, I don't see them other than my balance.      Objective: See treatment diary below      Assessment: Tolerated treatment well. Patient demonstrated fatigue post treatment, exhibited good technique with therapeutic exercises, and would benefit from continued OT. Pt demonstrated some difficulty manipulating nuts on/off bolts, also difficulty manipulating pegs in and out with tweezers, increase droppage of same noted.      Plan: Continue per plan of care.  Progress treatment as tolerated.          Precautions s/p CVA  Initial Evaluation completed on: 10/29/2024  Re-Evaluation needs to be completed before: 2024  Insurance: [email or web address] Rep    POC expires Auth Status Total   Visits  Start date  Expiration date PT/OT + Visit Limit? Co-Insurance     Required 10 10/29 12/4/24 10 $35 Co-pay for both pt and ot                                                                         Visit/Unit Tracking  AUTH Status: Required Date 10/29 11/4 11/7 11/13  11/18                 Visits  Authed: 10 Used 1 (IE) 2 3 4  5                   Remaining  9 8 7 6  5                    PLAN OF CARE START: 10/29/24  PLAN OF CARE END: 2025  PROGRESS NOTE DUE: 24 (SCHEDULED ON [facility])  FREQUENCY: 2x week for 4 weeks  PRECAUTIONS balance deficit   DIAGNOSIS: Cerebral Vascular Accident   VISITS: 4            RE-EVAL 2024           Manuals 10/29/2024  2024  11/7/24  11/13/24  2024                                                           Neuro Re-Ed  10/29/2024  2024  11/7/24  11/13/24  2024   Dynavision  B/L A  Right A  Left A    32/1.88  36/1.'67  45/1.33            Small Peg board  with PNF movements      Completed         Tension Pom Pom       BRP         PNF patterns with squigz and wrist weight       Completed         WBing       Parquetry Block x 1     Dynamic Task       2# CW w/ matching card board     Ther Ex 10/29/2024  11/4/2024  11/7/24  11/13/24  11/18/2024   Thera-Band  Sh Ext  Sh Retraction  Triceps  Biceps Curl  PNF Flex 1  PNF Ext 1             Flex Bar  Twists  Bends  Bottle Caps   Green  X 20  X 20  X 20  Green   X 20  X 20   X 20        Digi-Flex    Blue x 20         UBE Machine 10 Min L 1.0  5F/5B L 3.0   5F/5B min L 2.0 seated  5F/5B min L 2.0 seated    Finger abd    Blue RB   x 20                                                                 Ther Activity 10/29/2024  11/4/2024    11/13/24  11/18/2024   Grooved Peg Board    R 3:28  L 4:45         Tension Pin Pom Pom    BTP         Sm Pegs w/ Tweezers          All in/out with tweezers    nuts and bolts          all off/on                               Modalities 10/29/2024  11/4/2024    11/13/24  11/18/2024

## 2024-11-18 ENCOUNTER — OFFICE VISIT (OUTPATIENT)
Dept: OCCUPATIONAL THERAPY | Facility: CLINIC | Age: 79
End: 2024-11-18
Payer: COMMERCIAL

## 2024-11-18 ENCOUNTER — OFFICE VISIT (OUTPATIENT)
Dept: PHYSICAL THERAPY | Facility: CLINIC | Age: 79
End: 2024-11-18
Payer: COMMERCIAL

## 2024-11-18 DIAGNOSIS — I63.9 CEREBROVASCULAR ACCIDENT (CVA), UNSPECIFIED MECHANISM (HCC): Primary | ICD-10-CM

## 2024-11-18 PROCEDURE — 97110 THERAPEUTIC EXERCISES: CPT

## 2024-11-18 PROCEDURE — 97530 THERAPEUTIC ACTIVITIES: CPT

## 2024-11-18 PROCEDURE — 97112 NEUROMUSCULAR REEDUCATION: CPT

## 2024-11-18 NOTE — PROGRESS NOTES
"Daily Note     Today's date: 2024  Patient name: Gold Van  : 1945  MRN: [id number]  Referring provider: Annie Martines PA-C  Dx:   Encounter Diagnosis     ICD-10-CM    1. Cerebrovascular accident (CVA), unspecified mechanism (HCC)  I63.9                      Subjective: Patient reports he is doing well. Continues to note L drop foot. Presents w/ SPC.      Objective: See treatment diary below      Assessment: Tolerated treatment well progressed reps on LE strength exercises today. Patient continues to be challenged in SOLO with dynamic balance activities. LOBx1 in posterior direction while donning SOLO harness requiring PT assistance to correct. Patient would continue to benefit from skilled PT interventions to address deficits with strength, balance, gait, and endurance. Patient demonstrated fatigue post treatment      Plan: Continue per plan of care.      Insurance: [employer] # of visits: pending   Expiration date: pending     FOTO: NA    Re-eval Date:     Date 11/18 10/29 11/4 11/7 11/13   Visit Count 6 2 3 4 5   FOTO        Pain In        Pain Out        Vitals             Precautions DM, HTN, COPD       Manuals                                      Neuro Re-Ed  SOLO   SOLO  SOLO   HKM 30'x4   30' x 6  30' 6x   Sidestepping         Hurdles    Fw/lat 3 laps     BW amb 30' x4   30' x 2 30\" 2x    Step ups         Amb with HT/HN      30' x 4 ea  30' 4x ea   STS - raised        Static balance   FTEC level 30\" x 2    FAEC foam 30\" x 3     Education         Ther Ex        Nustep  L3 x 10 min Increase resistance please  L3, 15 min L3, 10 min  L3 x 10 min   Leg press  90# 3x10  2 x 10, 80#  80# 2x10  90# 2x10   LAQ/HS curls  44# ext 3x10  55# flex 2x10   44# ext 2 x 10   44# flex 2 x 10   44# ext 2x10  55# flex 2x10   Resisted sidestepping                                         Ther Activity                        Gait Training                        Modalities           "                Access Code: [email or web address]  URL: [email or web address]  Date: 10/29/2024  Prepared by: April Cain  - Side Stepping with Counter Support  - 1 x daily - 7 x weekly - 6 reps  - Walking March  - 1 x daily - 7 x weekly - 6 reps  - Forward Step Up with Counter Support  - 4 x weekly - 2 sets - 10 reps  - Side Stepping with Counter Support  - 1 x daily - 7 x weekly - 6 reps  - Sit to Stand  - 1 x daily - 7 x weekly - 2 sets - 10 reps

## 2024-11-20 ENCOUNTER — APPOINTMENT (OUTPATIENT)
Dept: OCCUPATIONAL THERAPY | Facility: CLINIC | Age: 79
End: 2024-11-20
Payer: COMMERCIAL

## 2024-11-20 ENCOUNTER — APPOINTMENT (OUTPATIENT)
Dept: PHYSICAL THERAPY | Facility: CLINIC | Age: 79
End: 2024-11-20
Payer: COMMERCIAL

## 2024-11-20 NOTE — PROGRESS NOTES
"OT Evaluation     Today's date: 2024  Patient name: Gold Van  : 1945  MRN: [id number]  Referring provider: Annie Martines PA-C  Dx:   No diagnosis found.               POC expires Auth Status Total   Visits  Start date  Expiration date PT/OT + Visit Limit? Co-Insurance    Required 10 10/29 12/4/24 10 $35 Co-pay for both pt and ot                                           Visit/Unit Tracking  AUTH Status: Required Date 10/29 11/4 11/7 11/13 11/18 11/20         Visits  Authed: 10 Used 1 (IE) 2 3 4 5 6          Remaining  9 8 7 6 5 4           PLAN OF CARE START: 10/29/24  PLAN OF CARE END: 2025  PROGRESS NOTE DUE: 24 (SCHEDULED ON ) (Possible discharge next visit) cancel remaining visits   FREQUENCY: 2x week for 4 weeks  PRECAUTIONS balance deficit   DIAGNOSIS: Cerebral Vascular Accident   VISITS: 6       Assessment  Impairments: abnormal coordination, abnormal or restricted ROM and impaired physical strength  Other impairment: decreased functional use  Symptom irritability: moderate    Assessment details: Patient presenting to OP OT services with a dx of CVA. Patient reports symptoms started 2024. Patient initially had symptoms checked were COVID related. Patient's last MD appointment was on 10/28/2024. Patient's next follow-up appointment with Neurology on 2025. Patient symptoms included upper extremity weakness and participation in IADLs. Patient reports right sided weakness. Patient is right hand dominant.    As per PT Evaluation:  Per EMR, \"Patient originally presented to [facility] ED on  for acute shortness of breath.  Patient had recent hospitalization from - for COVID.  He arrived to ED on CPAP and then transferred over to [facility] for  hours.  CXR showing mild opacity in the left base which could be due to atelectasis or pneumonia.  Patient started on IV ceftriazone, azithromycin and Solu-Medrol for suspected COPD exacerbation " "and pneumonia.  Sputum C&S positive for pseudomonas.  Patient was switched to IV cefepime and then transitioned to PO Vantin to complete 5-day course of antibiotics as recommended by pulmonology.   On 10/1 patient evaluated by therapy who noted new onset of right sided appendicular ataxia and truncal ataxia.   Neurology consulted.  Patient loaded on ASA 325mg and Plavix 300mg followed by aspirin 81mg daily and Plavix 75mg daily.  Also started on atorvastatin 40 mg daily.  CTA head/neck showing acute or subacute cortical infarct in the left precentral gyrus, severe multifocal atherosclerotic stenosis in the cavernous and supraclinoid segments of bilateral internal carotid arteries, severe atherosclerotic stenosis in pre-PICA segments of bilateral intracranial vertebral arteries, focal moderate mid basilar artery stenosis, atherosclerotic stenosis of the cervical ICAs.  MRI brain with multiple foci of acute infarcts involving bilateral frontoparietal cortices and subcortical white matter.  Some of the infarcts noted with some appearing watershed likely due to episodes of hypotension or hypoperfusion during his illness.  Other infarcts appear embolic but of unclear etiology.  Neurology recommended DAPT for 90 days total then ASA monotherapy as well as continuing with high intensity statin.  Neurology also recommended pan-CT to evaluate for underlying malignancy.  CT c/a/p without evidence of primary malignancy.  TTE with LVEF 50% and mild hypokinesia of the posterior wall.  Patient seen by cardiology in consultation and will need outpatient monitor and stress test.  Patient was seen by PT OT in consultation who recommended inpatient rehab services.\"      Pt's wife Stacia was present and assisted with history. Went to hospital for COVID. Then went home for 2 days and oxygen level was low. Woke up with R hand and arm not working right. He went back to the hospital and had pneumonia and mini strokes. Ended up in [facility]. Was " in the [facility]: 10/4-10/17 and now home. Going down is a lot easier than going up for steps. Has bars by toilet seat, has shower chair. Uses RW all the time now.     Barriers to therapy: Past Medical History:  No date: Asthma  No date: Diabetes mellitus (HCC)  No date: Environmental allergies  No date: Hyperlipidemia  No date: Hypertension  07/13/2020: Macular degeneration      Comment:  right eye  No date: Orthostatic hypotension  No date: Osteopenia  No date: Pneumothorax  No date: Sinusitis  Understanding of Dx/Px/POC: good     Prognosis: good    Goals  STGs    Pt will increase  strength by 5-10#. - Not Met    Pt will increase shoulder strength by 1/2 grade. - Not Met    Independent with HEP. - Not Met    Pt will reports no more than half of current pain rate with activity. - Not Met    Pt will increase fine motor coordination as evident by an improvement in 9-hole peg test by 3 seconds. - Not Met    LTGs     Pt will increase  strength by an additional 5-10#. - Not Met    Pt will increase shoulder strength by 1-2 grade. - Not Met    Pt will increase pinch strength by 3-5#. - Not Met    Pt will report an increase in IADL participation. - Not Met    Pt will increase fine motor coordination as evident by an improvement in 9-hole peg test by 5 seconds. - Not Met        Plan  Patient would benefit from: OT eval and skilled occupational therapy  Referral necessary: Yes  Planned modality interventions: ultrasound, unattended electrical stimulation, thermotherapy: hydrocollator packs, cryotherapy and thermotherapy: paraffin bath    Planned therapy interventions: massage, manual therapy, joint mobilization, patient education, strengthening, stretching, fine motor coordination training, home exercise program, neuromuscular re-education, self care, therapeutic exercise and therapeutic activities    Frequency: 2x week  Duration in weeks: 4  Treatment plan discussed with: patient  Plan details: Patient has presenting to  OP OT services with a dx of CVA. Patient demonstrating decreased GMC, decreased strength, decreased ROM and decreased activity. Pt would benefit from continued Occupational Therapy services two times per week for 4 weeks to return to prior level of function and achieve all established goals. Thank you for the referral!          Subjective Evaluation    History of Present Illness  Mechanism of injury: S/p CVA  Pain  Current pain ratin  At best pain ratin  At worst pain ratin  Location: Generalized          Objective     Active Range of Motion   Left Shoulder   Normal active range of motion    Right Shoulder   Normal active range of motion    Left Elbow   Normal active range of motion    Right Elbow   Normal active range of motion    Left Wrist   Normal active range of motion    Right Wrist   Normal active range of motion    Left Thumb     Opposition: WNL    Right Thumb   Opposition: WNL    Strength/Myotome Testing     Left Shoulder   Normal muscle strength    Right Shoulder     Planes of Motion   Flexion: 4   Extension: 4   Abduction: 4   Adduction: 4     Left Elbow   Normal strength    Right Elbow   Flexion: 4  Extension: 4  Forearm supination: 4  Forearm pronation: 4    Left Wrist/Hand   Wrist extension: 4+  Wrist flexion: 4+  Radial deviation: 4+  Ulnar deviation: 4+     (2nd hand position)     Trial 1: 55    Thumb Strength  Key/Lateral Pinch     Trial 1: 11    Right Wrist/Hand   Wrist extension: 4  Wrist flexion: 4  Radial deviation: 4  Ulnar deviation: 4     (2nd hand position)     Trial 1: 50    Thumb Strength   Key/Lateral Pinch     Trial 1: 10    Additional Strength Details  Patient demonstrating with a decrease in wrist,  and pinch strength compared to unaffected upper extremity.     Neuro Exam:     Functional outcomes   Left 9 peg hole test: 35.67 (seconds)  Right 9 hole peg test: 29.08 (seconds)        RE-EVAL 2024           Manuals 10/29/2024  2024  11/7/24  11/13/24                                                              Neuro Re-Ed  10/29/2024  11/4/2024  11/7/24  11/13/24     Dynavision  B/L A  Right A  Left A    32/1.88  36/1.'67  45/1.33            Small Peg board with PNF movements      Completed         Tension Pom Pom       BRP         PNF patterns with squigz and wrist weight       Completed         WBing       Parquetry Block x 1     Dynamic Task       2# CW w/ matching card board     Ther Ex 10/29/2024  11/4/2024  11/7/24  11/13/24     Thera-Band  Sh Ext  Sh Retraction  Triceps  Biceps Curl  PNF Flex 1  PNF Ext 1             Flex Bar  Twists  Bends  Bottle Caps   Green  X 20  X 20  X 20  Green   X 20  X 20   X 20        Digi-Flex    Blue x 20         UBE Machine 10 Min L 1.0  5F/5B L 3.0   5F/5B min L 2.0 seated      Finger abd    Blue RB   x 20                                                                 Ther Activity 10/29/2024  11/4/2024    11/13/24     Grooved Peg Board    R 3:28  L 4:45         Tension Pin Pom Pom    BTP         Sm Pegs w/ Tweezers                                                       Modalities 10/29/2024  11/4/2024    11/13/24

## 2024-11-22 NOTE — PROGRESS NOTES
"Daily Note     Today's date: 2024  Patient name: Gold Van  : 1945  MRN: [id number]  Referring provider: Annie Martines PA-C  Dx:   Encounter Diagnosis     ICD-10-CM    1. Cerebrovascular accident (CVA), unspecified mechanism (HCC)  I63.9                      Subjective:       Objective: See treatment diary below      Assessment: Tolerated treatment {Tolerated treatment :[id number]}. Patient would benefit from continued PT      Plan: Continue per plan of care.      Insurance: [employer] # of visits: pending   Expiration date: pending     FOTO: NA    Re-eval Date:     Date    Visit Count 6 7 3 4 5   FOTO        Pain In        Pain Out        Vitals             Precautions DM, HTN, COPD       Manuals                                      Neuro Re-Ed  SOLO SOLO  SOLO  SOLO   HKM 30'x4 30'x4  30' x 6  30' 6x   Sidestepping         Hurdles    Fw/lat 3 laps     BW amb 30' x4 30'x4  30' x 2 30\" 2x    Step ups         Amb with HT/HN    30'x4  30' x 4 ea  30' 4x ea   STS - raised        Static balance   FTEC level 30\" x 2    FAEC foam 30\" x 3     Education         Ther Ex        Nustep  L3 x 10 min L4 10 mins  L3, 15 min L3, 10 min  L3 x 10 min   Leg press  90# 3x10 90# 3x10 2 x 10, 80#  80# 2x10  90# 2x10   LAQ/HS curls  44# ext 3x10  55# flex 2x10  44# Ext 3x10  55# Flex 2x10 44# ext 2 x 10   44# flex 2 x 10   44# ext 2x10  55# flex 2x10   Resisted sidestepping                                         Ther Activity                        Gait Training                        Modalities                          Access Code: [email or web address]  URL: [email or web address]  Date: 10/29/2024  Prepared by: April Cain  - Side Stepping with Counter Support  - 1 x daily - 7 x weekly - 6 reps  - Walking  x daily - 7 x weekly - 6 reps  - Forward Step Up with Counter Support  - 4 x weekly - 2 sets - 10 reps  - Side Stepping with " Counter Support  - 1 x daily - 7 x weekly - 6 reps  - Sit to Stand  - 1 x daily - 7 x weekly - 2 sets - 10 reps

## 2024-11-25 ENCOUNTER — OFFICE VISIT (OUTPATIENT)
Dept: PHYSICAL THERAPY | Facility: CLINIC | Age: 79
End: 2024-11-25
Payer: COMMERCIAL

## 2024-11-25 ENCOUNTER — OFFICE VISIT (OUTPATIENT)
Dept: OCCUPATIONAL THERAPY | Facility: CLINIC | Age: 79
End: 2024-11-25
Payer: COMMERCIAL

## 2024-11-25 DIAGNOSIS — I63.9 CEREBROVASCULAR ACCIDENT (CVA), UNSPECIFIED MECHANISM (HCC): Primary | ICD-10-CM

## 2024-11-25 PROCEDURE — 97110 THERAPEUTIC EXERCISES: CPT | Performed by: PHYSICAL THERAPIST

## 2024-11-25 PROCEDURE — 97150 GROUP THERAPEUTIC PROCEDURES: CPT

## 2024-11-25 PROCEDURE — 97530 THERAPEUTIC ACTIVITIES: CPT

## 2024-11-25 PROCEDURE — 97112 NEUROMUSCULAR REEDUCATION: CPT | Performed by: PHYSICAL THERAPIST

## 2024-11-25 NOTE — PROGRESS NOTES
Occupational Therapy Daily Note:    Today's date: 2024  Patient name: Gold Van  : 1945  MRN: [id number]  Referring provider: Annie Martines PA-C  Dx:   Encounter Diagnosis   Name Primary?    Cerebrovascular accident (CVA), unspecified mechanism (HCC) Yes                  Precautions s/p CVA  Initial Evaluation completed on: 10/29/2024  Re-Evaluation needs to be completed before: 2024  Insurance: [email or web address]  Rep     POC expires Auth Status Total   Visits  Start date  Expiration date PT/OT + Visit Limit? Co-Insurance     Required 10 10/29 12/4/24 10 $35 Co-pay for both pt and ot                                                                         Visit/Unit Tracking  AUTH Status: Required Date 10/29 11/4 11/7 11/13 11/18 11/25               Visits  Authed: 10 Used 1 (IE) 2 3 4  5  6                 Remaining  9 8 7 6  5  4                  PLAN OF CARE START: 10/29/24  PLAN OF CARE END: 2025  PROGRESS NOTE DUE: 24 (SCHEDULED ON [facility])  FREQUENCY: 2x week for 4 weeks  PRECAUTIONS balance deficit   DIAGNOSIS: Cerebral Vascular Accident      TIME: 24  10:30 - 10:40 = /  10:40 - 10:50 = Group  10:50 - 11:15 = Self Directed    Subjective: I dont really understand why I need the OT.     Objective: See treatment below. Pt engaged in skilled OT this date with focus to UE strength/endurance, proximal stability, GMC/GMS, in hand manipulation, functional reach, midline recognition, sustained grasp, /VM skills, and overall activity tolerance/endurance, for increased safety and engagement in ADL/IADL activities.     Thera Ex: Pt engaged in UEB this date while seated for 5 prograde/5 retrograde min L 4.0 with focus to UE strength/endurance, GMC/GMS, sustained grasp, ROM, cardiovascular endurance, and activity tolerance/engagement.  Fatigue noted increasing from level 2-4.    Blue extension rubberband x 30 reps. Educated for home/options.     Thera Act:  Increased time  taken this date to complete HEP program for pt and review tasks including card flipping, clothespin/cotton ball task, coin manipulation, digit extension rubberband, tweezer/bead task, etc..... Pt stating verbal understanding and chosing to review program with therapist versus just complete tasks during session.     Pt also completed Collins Peg task this date with manipulation from palm to digits to place to target on tabletop. Drops noted < 25% of the task.     Assessment: Tolerated treatment well. Patient would benefit from continued skilled OT.    Plan: Continued skilled OT per POC

## 2024-11-26 ENCOUNTER — APPOINTMENT (OUTPATIENT)
Age: 79
End: 2024-11-26
Payer: COMMERCIAL

## 2024-11-26 ENCOUNTER — CONSULT (OUTPATIENT)
Dept: CARDIOLOGY CLINIC | Facility: CLINIC | Age: 79
End: 2024-11-26
Payer: COMMERCIAL

## 2024-11-26 VITALS
DIASTOLIC BLOOD PRESSURE: 70 MMHG | HEIGHT: 72 IN | OXYGEN SATURATION: 98 % | HEART RATE: 74 BPM | WEIGHT: 162 LBS | BODY MASS INDEX: 21.94 KG/M2 | SYSTOLIC BLOOD PRESSURE: 140 MMHG

## 2024-11-26 DIAGNOSIS — I73.89 OTHER SPECIFIED PERIPHERAL VASCULAR DISEASES (HCC): ICD-10-CM

## 2024-11-26 DIAGNOSIS — I10 ESSENTIAL HYPERTENSION: Primary | Chronic | ICD-10-CM

## 2024-11-26 DIAGNOSIS — R93.1 ABNORMAL ECHOCARDIOGRAM: ICD-10-CM

## 2024-11-26 DIAGNOSIS — E78.49 OTHER HYPERLIPIDEMIA: Chronic | ICD-10-CM

## 2024-11-26 DIAGNOSIS — E11.9 TYPE 2 DIABETES MELLITUS WITHOUT COMPLICATION, UNSPECIFIED WHETHER LONG TERM INSULIN USE (HCC): ICD-10-CM

## 2024-11-26 DIAGNOSIS — I63.9 CEREBROVASCULAR ACCIDENT (CVA), UNSPECIFIED MECHANISM (HCC): ICD-10-CM

## 2024-11-26 DIAGNOSIS — R53.83 OTHER FATIGUE: ICD-10-CM

## 2024-11-26 DIAGNOSIS — E55.9 VITAMIN D DEFICIENCY: ICD-10-CM

## 2024-11-26 DIAGNOSIS — I10 ESSENTIAL HYPERTENSION, MALIGNANT: ICD-10-CM

## 2024-11-26 DIAGNOSIS — R73.9 BLOOD GLUCOSE ELEVATED: ICD-10-CM

## 2024-11-26 DIAGNOSIS — E78.5 HYPERLIPIDEMIA, UNSPECIFIED HYPERLIPIDEMIA TYPE: ICD-10-CM

## 2024-11-26 DIAGNOSIS — Z79.899 NEED FOR PROPHYLACTIC CHEMOTHERAPY: ICD-10-CM

## 2024-11-26 DIAGNOSIS — U07.1 COVID-19: ICD-10-CM

## 2024-11-26 LAB
25(OH)D3 SERPL-MCNC: 25.9 NG/ML (ref 30–100)
ALBUMIN SERPL BCG-MCNC: 3.6 G/DL (ref 3.5–5)
ALP SERPL-CCNC: 70 U/L (ref 34–104)
ALT SERPL W P-5'-P-CCNC: 13 U/L (ref 7–52)
ANION GAP SERPL CALCULATED.3IONS-SCNC: 9 MMOL/L (ref 4–13)
AST SERPL W P-5'-P-CCNC: 11 U/L (ref 13–39)
BILIRUB SERPL-MCNC: 0.59 MG/DL (ref 0.2–1)
BUN SERPL-MCNC: 13 MG/DL (ref 5–25)
CALCIUM SERPL-MCNC: 8.9 MG/DL (ref 8.4–10.2)
CHLORIDE SERPL-SCNC: 101 MMOL/L (ref 96–108)
CHOLEST SERPL-MCNC: 120 MG/DL (ref ?–200)
CO2 SERPL-SCNC: 26 MMOL/L (ref 21–32)
CREAT SERPL-MCNC: 0.88 MG/DL (ref 0.6–1.3)
CREAT UR-MCNC: 70.9 MG/DL
ERYTHROCYTE [DISTWIDTH] IN BLOOD BY AUTOMATED COUNT: 14.9 % (ref 11.6–15.1)
EST. AVERAGE GLUCOSE BLD GHB EST-MCNC: 197 MG/DL
GFR SERPL CREATININE-BSD FRML MDRD: 81 ML/MIN/1.73SQ M
GLUCOSE P FAST SERPL-MCNC: 214 MG/DL (ref 65–99)
HBA1C MFR BLD: 8.5 %
HCT VFR BLD AUTO: 34.9 % (ref 36.5–49.3)
HDLC SERPL-MCNC: 42 MG/DL
HGB BLD-MCNC: 11.1 G/DL (ref 12–17)
LDLC SERPL CALC-MCNC: 63 MG/DL (ref 0–100)
MCH RBC QN AUTO: 30 PG (ref 26.8–34.3)
MCHC RBC AUTO-ENTMCNC: 31.8 G/DL (ref 31.4–37.4)
MCV RBC AUTO: 94 FL (ref 82–98)
MICROALBUMIN UR-MCNC: 270.9 MG/L
MICROALBUMIN/CREAT 24H UR: 382 MG/G CREATININE (ref 0–30)
NONHDLC SERPL-MCNC: 78 MG/DL
PLATELET # BLD AUTO: 259 THOUSANDS/UL (ref 149–390)
PMV BLD AUTO: 10.1 FL (ref 8.9–12.7)
POTASSIUM SERPL-SCNC: 4.2 MMOL/L (ref 3.5–5.3)
PROT SERPL-MCNC: 6.5 G/DL (ref 6.4–8.4)
RBC # BLD AUTO: 3.7 MILLION/UL (ref 3.88–5.62)
SODIUM SERPL-SCNC: 136 MMOL/L (ref 135–147)
T4 FREE SERPL-MCNC: 0.92 NG/DL (ref 0.61–1.12)
TRIGL SERPL-MCNC: 75 MG/DL (ref ?–150)
TSH SERPL DL<=0.05 MIU/L-ACNC: 2.18 UIU/ML (ref 0.45–4.5)
WBC # BLD AUTO: 8.75 THOUSAND/UL (ref 4.31–10.16)

## 2024-11-26 PROCEDURE — 85027 COMPLETE CBC AUTOMATED: CPT

## 2024-11-26 PROCEDURE — 82306 VITAMIN D 25 HYDROXY: CPT

## 2024-11-26 PROCEDURE — 84443 ASSAY THYROID STIM HORMONE: CPT

## 2024-11-26 PROCEDURE — 99214 OFFICE O/P EST MOD 30 MIN: CPT | Performed by: INTERNAL MEDICINE

## 2024-11-26 PROCEDURE — 80061 LIPID PANEL: CPT

## 2024-11-26 PROCEDURE — 84439 ASSAY OF FREE THYROXINE: CPT

## 2024-11-26 PROCEDURE — 36415 COLL VENOUS BLD VENIPUNCTURE: CPT

## 2024-11-26 PROCEDURE — 83036 HEMOGLOBIN GLYCOSYLATED A1C: CPT

## 2024-11-26 PROCEDURE — 82043 UR ALBUMIN QUANTITATIVE: CPT

## 2024-11-26 PROCEDURE — 80053 COMPREHEN METABOLIC PANEL: CPT

## 2024-11-26 PROCEDURE — 82570 ASSAY OF URINE CREATININE: CPT

## 2024-11-26 NOTE — ASSESSMENT & PLAN NOTE
-With hospitalization in September 2024  -Continue to monitor  -Patient will continue to follow with pulmonology for ongoing monitoring.

## 2024-11-26 NOTE — PROGRESS NOTES
Patient ID: Gold Van is a 79 y.o. male.        Plan:      Assessment & Plan  Abnormal echocardiogram  -In the setting of CVA and abnormal echocardiographic imaging along with use of cane will have patient undergo pharmacologic nuclear stress test for further evaluation with additional recommendations pending results  Essential hypertension  -Patient notes blood pressures at home are well-controlled can continue amlodipine 5 mg twice daily, atenolol 25 mg daily, lisinopril 20 mg twice daily  -Counseled patient on dietary and lifestyle modifications along with monitoring  Cerebrovascular accident (CVA), unspecified mechanism (HCC)  -Patient should follow-up with vascular team along with neurology  -Can continue dual antiplatelet therapy with aspirin 81 mg daily and Plavix 75 mg daily managed by neurology team  -Continue to monitor  COVID-19  -With hospitalization in September 2024  -Continue to monitor  -Patient will continue to follow with pulmonology for ongoing monitoring.  Other hyperlipidemia  -Counseled patient on dietary and lifestyle modifications  -Continue atorvastatin 40 mg daily  -Continue to monitor and follow-up pending laboratory study results  Other specified peripheral vascular diseases (HCC)  -Patient should follow with vascular team  -Continue dual antiplatelet regimen managed by neurology  -Continue atorvastatin 40 mg daily      Follow up Plan/Other summary comments:  -Follow-up pending laboratory study results including fasting lipid panel, CMP and CBC  -Patient can continue dual antiplatelet regimen with aspirin 81 mg daily and Plavix 75 mg daily managed by neurology team given CVA  -Would recommend vascular evaluation  -Will have patient undergo a 30-day event monitor and if no significant evidence of atrial fibrillation will likely recommend implantable loop recorder at that time if recommended by neurology  -Patient can continue amlodipine 5 mg twice daily, atenolol 25 mg daily,  lisinopril 20 mg twice daily   -patient was counseled on dietary and lifestyle modifications including following a low-salt, low-fat, heart healthy diet with sodium restriction to less than 1800 mg of sodium daily, DASH diet, NSAID avoidance  -Patient will be seen in 3 months or sooner if necessary once testing is completed  -Patient unsure if he would want to undergo implantable loop recorder but is agreeable to 30-day event monitor  -Cussed with patient all recommendations for initiation of diuretic regimen however at this time he declines but is agreeable to lifestyle modifications including salt restriction to less than 1800 mg of sodium daily, fluid restriction to less than 1800 mm of fluid daily, use of compression stockings and leg elevation.  -Patient will undergo pharmacologic nuclear stress testing for evaluation with additional recommendations pending results  -Patient counseled if he were to have any warning or alarm type symptoms he is to seek emergency medical care immediately.    HPI:   -Patient is a 79-year-old male hospitalized in September and then again in October 2024 initially for COVID and then with worsening respiratory status with suspected COPD exacerbation pneumonia although during that time was found to have a new onset right sided ataxia and neurology was consulted.  Patient was initiated on dual antiplatelet therapy and imaging showed acute to subacute infarction and MRI of brain showed multiple foci with concern raised for possible hypoperfusion and could not rule out embolic etiology.  Patient underwent echocardiographic imaging which showed low normal function with regional variation in the posterior wall and cardiology was consulted and saw patient who recommended outpatient ambulatory event monitoring and stress testing.  He presents to the office today for hospital follow-up  -Currently in the office today patient denies any chest pain, palpitations, lightness dizziness, loss  consciousness, shortness of breath, orthopnea or bendopnea.  He notes for many years now he has had intermittent lower extremity edema that seems to improve with leg elevation at night only worsens throughout the day.  -He denies any exertional symptoms and states blood pressures at home are well-controlled and overall feels reasonably well given his hospitalization earlier in the year.  -Patient denies any tobacco, alcohol, illicit drug use.  -Patient denies any known family history of heart disease.    Most recent or relevant cardiac/vascular testing:    -Transthoracic echocardiogram 10/1/2024 showing left ventricular systolic function lower limits of normal estimated LVEF 50% with hypokinesia of the posterior wall with grade 1 diastolic dysfunction, mild aortic regurgitation and normal right ventricular systolic function with estimated RVSP 37 mmHg and IVC normal in size    -CTA head and neck 10/1/2024 showed severe multifocal atherosclerotic stenosis in the cavernous and supraclinoid segments of bilateral internal carotid arteries with severe atherosclerotic stenosis in the pre-PICA segments of bilateral intracranial vertebral arteries, focal moderate mid basilar artery stenosis and atherosclerotic stenosis of the cervical ICAs about 55% on the left and 50% on the right.      Past Surgical History:   Procedure Laterality Date    CARPAL TUNNEL RELEASE Left 08/2024    CATARACT EXTRACTION Left 07/2024    COLONOSCOPY      KNEE CARTILAGE SURGERY Right 1964    VASECTOMY      WISDOM TOOTH EXTRACTION      x4         Review of Systems   Review of Systems   Constitutional:  Negative for chills, diaphoresis, fatigue and fever.   HENT:  Negative for trouble swallowing and voice change.    Eyes:  Negative for pain and redness.   Respiratory:  Negative for shortness of breath and wheezing.    Cardiovascular:  Positive for leg swelling (intermittent). Negative for chest pain and palpitations.   Gastrointestinal:  Negative for  abdominal pain, blood in stool, constipation, diarrhea, nausea and vomiting.   Genitourinary:  Negative for dysuria.   Musculoskeletal:  Positive for arthralgias. Negative for neck pain and neck stiffness.   Skin:  Negative for rash.   Neurological:  Negative for dizziness, syncope, light-headedness and headaches.   Psychiatric/Behavioral:  Negative for agitation and confusion.    All other systems reviewed and are negative.         Objective:     /70   Pulse 74   Ht 6' (1.829 m)   Wt 73.5 kg (162 lb)   SpO2 98%   BMI 21.97 kg/m²     PHYSICAL EXAM:  Physical Exam  Vitals reviewed.   Constitutional:       General: He is not in acute distress.     Appearance: Normal appearance. He is not diaphoretic.   HENT:      Head: Normocephalic and atraumatic.   Eyes:      General:         Right eye: No discharge.         Left eye: No discharge.   Neck:      Comments: Trachea midline, no significant JVD appreciated  Cardiovascular:      Rate and Rhythm: Normal rate and regular rhythm.      Heart sounds:      No friction rub.   Pulmonary:      Effort: No respiratory distress.      Breath sounds: No wheezing.   Chest:      Chest wall: No tenderness.   Abdominal:      General: Bowel sounds are normal.      Palpations: Abdomen is soft.      Tenderness: There is no abdominal tenderness. There is no rebound.   Musculoskeletal:      Right lower leg: Edema (1+) present.      Left lower leg: Edema (1+) present.   Skin:     General: Skin is warm and dry.   Neurological:      Mental Status: He is alert.      Comments: Awake, alert, able to answer questions appropriately, able move extremities bilaterally, uses cane   Psychiatric:         Mood and Affect: Mood normal.         Behavior: Behavior normal.          Meds reviewed.  Current Outpatient Medications on File Prior to Visit   Medication Sig Dispense Refill    albuterol (ACCUNEB) 1.25 MG/3ML nebulizer solution Take 1.25 mg by nebulization every 6 (six) hours as needed for  wheezing      albuterol (PROVENTIL HFA,VENTOLIN HFA) 90 mcg/act inhaler Ventolin HFA 90 mcg/actuation aerosol inhaler      amLODIPine (NORVASC) 5 mg tablet Take 5 mg by mouth 2 (two) times a day      aspirin 81 mg chewable tablet Chew 1 tablet (81 mg total) daily      atenolol (TENORMIN) 25 mg tablet Take 25 mg by mouth daily      atorvastatin (LIPITOR) 40 mg tablet Take 1 tablet (40 mg total) by mouth every evening 30 tablet 0    Budeson-Glycopyrrol-Formoterol (Breztri Aerosphere) 160-9-4.8 MCG/ACT AERO Take 2 puffs by mouth Every 12 hours      clopidogrel (PLAVIX) 75 mg tablet Take 1 tablet (75 mg total) by mouth daily Do not start before October 18, 2024. 75 tablet 0    famotidine (PEPCID) 20 mg tablet Take 1 tablet (20 mg total) by mouth 2 (two) times a day      glipiZIDE (GLUCOTROL XL) 2.5 mg 24 hr tablet Take 2.5 mg by mouth 2 (two) times a day      guaiFENesin (MUCINEX) 600 mg 12 hr tablet Take 1 tablet (600 mg total) by mouth 2 (two) times a day      lisinopril (ZESTRIL) 20 mg tablet Take 20 mg by mouth 2 (two) times a day      LORazepam (ATIVAN) 0.5 mg tablet Take 0.5 mg by mouth if needed      metFORMIN (GLUCOPHAGE-XR) 750 mg 24 hr tablet Take 1 tablet (750 mg total) by mouth daily with dinner      montelukast (SINGULAIR) 10 mg tablet Take 1 tablet (10 mg total) by mouth daily at bedtime      Multiple Vitamins-Minerals (Multivitamin Adults) TABS Take 1 tablet by mouth daily      predniSONE 5 mg tablet Take 1 tablet (5 mg total) by mouth every other day      predniSONE 5 mg tablet Take 3 tablets (15 mg total) by mouth every other day       No current facility-administered medications on file prior to visit.      Past Medical History:   Diagnosis Date    Asthma     COVID-19     CVA (cerebral vascular accident) (HCC)     Diabetes mellitus (HCC)     Environmental allergies     Hyperlipidemia     Hypertension     Macular degeneration 07/13/2020    right eye    Orthostatic hypotension     Osteopenia      Pneumonia     Pneumothorax     Sinusitis        Social History     Tobacco Use   Smoking Status Former   Smokeless Tobacco Never   Tobacco Comments    quit about 25 years ago     Family History   Problem Relation Age of Onset    Asthma Mother     Emphysema Mother     Cancer Father     Asthma Brother     Cancer Brother

## 2024-11-26 NOTE — ASSESSMENT & PLAN NOTE
-Patient should follow-up with vascular team along with neurology  -Can continue dual antiplatelet therapy with aspirin 81 mg daily and Plavix 75 mg daily managed by neurology team  -Continue to monitor

## 2024-11-26 NOTE — ASSESSMENT & PLAN NOTE
-Patient should follow with vascular team  -Continue dual antiplatelet regimen managed by neurology  -Continue atorvastatin 40 mg daily

## 2024-11-26 NOTE — ASSESSMENT & PLAN NOTE
-Counseled patient on dietary and lifestyle modifications  -Continue atorvastatin 40 mg daily  -Continue to monitor and follow-up pending laboratory study results

## 2024-11-26 NOTE — ASSESSMENT & PLAN NOTE
-Patient notes blood pressures at home are well-controlled can continue amlodipine 5 mg twice daily, atenolol 25 mg daily, lisinopril 20 mg twice daily  -Counseled patient on dietary and lifestyle modifications along with monitoring

## 2024-11-26 NOTE — ASSESSMENT & PLAN NOTE
-In the setting of CVA and abnormal echocardiographic imaging along with use of cane will have patient undergo pharmacologic nuclear stress test for further evaluation with additional recommendations pending results

## 2024-11-27 ENCOUNTER — APPOINTMENT (OUTPATIENT)
Dept: OCCUPATIONAL THERAPY | Facility: CLINIC | Age: 79
End: 2024-11-27
Payer: COMMERCIAL

## 2024-11-27 ENCOUNTER — APPOINTMENT (OUTPATIENT)
Dept: PHYSICAL THERAPY | Facility: CLINIC | Age: 79
End: 2024-11-27
Payer: COMMERCIAL

## 2024-11-29 PROBLEM — J18.9 PNEUMONIA OF BOTH LOWER LOBES DUE TO INFECTIOUS ORGANISM: Status: RESOLVED | Noted: 2024-10-30 | Resolved: 2024-11-29

## 2024-12-02 ENCOUNTER — TELEPHONE (OUTPATIENT)
Dept: CARDIOLOGY CLINIC | Facility: CLINIC | Age: 79
End: 2024-12-02

## 2024-12-16 ENCOUNTER — TELEPHONE (OUTPATIENT)
Age: 79
End: 2024-12-16

## 2024-12-16 NOTE — TELEPHONE ENCOUNTER
Caller: Scooter Van    Doctor and/or Office: Dr. Holloway/Dominik    #: [phone]    Escalation: Appointment Patient is coming in for foot drop on 1-3. Can he also get nail care at this visit or does he need a separate appt for that? Please call and advise. Thank you

## 2024-12-16 NOTE — TELEPHONE ENCOUNTER
Called patient to inform that two appointments will not be necessary- that he can be seen for both at the same visit. Left voicemail on machine.

## 2024-12-30 ENCOUNTER — DOCUMENTATION (OUTPATIENT)
Dept: CARDIOLOGY CLINIC | Facility: CLINIC | Age: 79
End: 2024-12-30

## 2024-12-30 ENCOUNTER — TELEPHONE (OUTPATIENT)
Dept: CARDIOLOGY CLINIC | Facility: CLINIC | Age: 79
End: 2024-12-30

## 2024-12-30 NOTE — TELEPHONE ENCOUNTER
Verbal notification for urgent ECG  Auto-triggered event 12/28 @ 12:50pm  New onset atrial flutter with HR of 140bpm  Report posted

## 2024-12-31 ENCOUNTER — TELEPHONE (OUTPATIENT)
Dept: NON INVASIVE DIAGNOSTICS | Facility: HOSPITAL | Age: 79
End: 2024-12-31

## 2024-12-31 NOTE — TELEPHONE ENCOUNTER
-I was able to call and speak with patient about result message sent from event monitor concerning for atrial fibrillation.  I did recommend patient be transition from dual antiplatelet therapy to oral anticoagulation given elevated [id number] score and recent stroke.  Patient denied any significant symptoms and states that this time he wishes to think about this prior to transitioning and will let our office know.  Will follow-up with him once complete report has been filed as he is still wearing monitor at this time.  Patient will also discuss with his wife and let me know if he changes his mind so that oral anticoagulation can be prescribed.

## 2025-01-03 ENCOUNTER — OFFICE VISIT (OUTPATIENT)
Dept: PODIATRY | Facility: CLINIC | Age: 80
End: 2025-01-03
Payer: COMMERCIAL

## 2025-01-03 VITALS — BODY MASS INDEX: 21.94 KG/M2 | HEIGHT: 72 IN | WEIGHT: 162 LBS

## 2025-01-03 DIAGNOSIS — M79.675 PAIN IN TOES OF BOTH FEET: ICD-10-CM

## 2025-01-03 DIAGNOSIS — B35.1 ONYCHOMYCOSIS: ICD-10-CM

## 2025-01-03 DIAGNOSIS — Z79.01 LONG TERM CURRENT USE OF ANTICOAGULANT: ICD-10-CM

## 2025-01-03 DIAGNOSIS — G62.9 NEUROPATHY: ICD-10-CM

## 2025-01-03 DIAGNOSIS — M21.372 FOOT DROP, LEFT: Primary | ICD-10-CM

## 2025-01-03 DIAGNOSIS — E11.8 TYPE 2 DIABETES MELLITUS WITH COMPLICATION, WITHOUT LONG-TERM CURRENT USE OF INSULIN (HCC): ICD-10-CM

## 2025-01-03 DIAGNOSIS — M79.674 PAIN IN TOES OF BOTH FEET: ICD-10-CM

## 2025-01-03 PROCEDURE — 11721 DEBRIDE NAIL 6 OR MORE: CPT | Performed by: PODIATRIST

## 2025-01-03 PROCEDURE — RECHECK: Performed by: PODIATRIST

## 2025-01-05 NOTE — ASSESSMENT & PLAN NOTE
Orders:    AFO Ankle Foot Orthotic Spring Wire Dorsiflexion Assist Calf Band  The brace was briefly explained to the patient on that it will help reduce his foot from slapping the floor dragging across the floor.  He was also told that most he would most likely not need to change the size of his shoe.  Patient was encouraged to ask the staff that would be calling him regarding the brace all the questions he has regarding it.

## 2025-01-05 NOTE — PROGRESS NOTES
Name: Gold Van      : 1945      MRN: [id number]  Encounter Provider: Gino Holloway DPM  Encounter Date: 1/3/2025   Encounter department: [facility] [place]  :  Assessment & Plan  Foot drop, left    Orders:    AFO Ankle Foot Orthotic Spring Wire Dorsiflexion Assist Calf Band  The brace was briefly explained to the patient on that it will help reduce his foot from slapping the floor dragging across the floor.  He was also told that most he would most likely not need to change the size of his shoe.  Patient was encouraged to ask the staff that would be calling him regarding the brace all the questions he has regarding it.  Neuropathy         Onychomycosis       Debride mycotic nails and thin the nail plates x 10 with the use of a nail nipper manually and an electric Dremel bur was used to reduce the thickness of the nail beds and smoothed the distal aspect of the nails.   Long term current use of anticoagulant         Type 2 diabetes mellitus with complication, without long-term current use of insulin ([facility])    Lab Results   Component Value Date    HGBA1C 8.5 (H) 2024            Pain in toes of both feet         Discussed proper shoe gear, daily inspections of feet, and general foot health with patient. Patient has Q8  findings and is recommended for at risk foot care every 9-10 weeks.    Patients most recent complete clinical foot exam was on: 10/22/2024      Return in about 10 weeks (around 3/14/2025).     History of Present Illness   HPI  Gold Van is a 79 y.o. male who presents with chief complaint of painful thick nails on both feet and has questions regarding a foot drop brace for his left side.  He is a diabetic who also has neuropathy.  His wife was present in the room for today's visit.Patient presents for at-risk foot care.  Patient has no acute concerns today.  Patient has significant lower extremity risk due to neuropathy, parasthesia, edema, and trophic skin  changes to the lower extremity.   History obtained from: patient    Review of Systems  Medical History Reviewed by provider this encounter:     .  Current Outpatient Medications on File Prior to Visit   Medication Sig Dispense Refill    albuterol (ACCUNEB) 1.25 MG/3ML nebulizer solution Take 1.25 mg by nebulization every 6 (six) hours as needed for wheezing      albuterol (PROVENTIL HFA,VENTOLIN HFA) 90 mcg/act inhaler Ventolin HFA 90 mcg/actuation aerosol inhaler      amLODIPine (NORVASC) 5 mg tablet Take 5 mg by mouth 2 (two) times a day      aspirin 81 mg chewable tablet Chew 1 tablet (81 mg total) daily      atenolol (TENORMIN) 25 mg tablet Take 25 mg by mouth daily      atorvastatin (LIPITOR) 40 mg tablet Take 1 tablet (40 mg total) by mouth every evening 30 tablet 0    Budeson-Glycopyrrol-Formoterol (Breztri Aerosphere) 160-9-4.8 MCG/ACT AERO Take 2 puffs by mouth Every 12 hours      famotidine (PEPCID) 20 mg tablet Take 1 tablet (20 mg total) by mouth 2 (two) times a day      glipiZIDE (GLUCOTROL XL) 2.5 mg 24 hr tablet Take 2.5 mg by mouth 2 (two) times a day      guaiFENesin (MUCINEX) 600 mg 12 hr tablet Take 1 tablet (600 mg total) by mouth 2 (two) times a day      lisinopril (ZESTRIL) 20 mg tablet Take 20 mg by mouth 2 (two) times a day      LORazepam (ATIVAN) 0.5 mg tablet Take 0.5 mg by mouth if needed      metFORMIN (GLUCOPHAGE-XR) 750 mg 24 hr tablet Take 1 tablet (750 mg total) by mouth daily with dinner      montelukast (SINGULAIR) 10 mg tablet Take 1 tablet (10 mg total) by mouth daily at bedtime      Multiple Vitamins-Minerals (Multivitamin Adults) TABS Take 1 tablet by mouth daily      predniSONE 5 mg tablet Take 1 tablet (5 mg total) by mouth every other day      predniSONE 5 mg tablet Take 3 tablets (15 mg total) by mouth every other day      clopidogrel (PLAVIX) 75 mg tablet Take 1 tablet (75 mg total) by mouth daily Do not start before October 18, 2024. 75 tablet 0     No current  facility-administered medications on file prior to visit.      Social History     Tobacco Use    Smoking status: Former    Smokeless tobacco: Never    Tobacco comments:     quit about 25 years ago   Vaping Use    Vaping status: Never Used   Substance and Sexual Activity    Alcohol use: Never    Drug use: Never    Sexual activity: Yes     Partners: Female        Objective   Ht 6' (1.829 m)   Wt 73.5 kg (162 lb)   BMI 21.97 kg/m²      Physical Exam  Vascular status is a faint 1/4 DP PT negative digital hair slightly delayed capillary refill bilaterally.  Capillary refill is approximately 2 to 3 seconds and there was no edema present today.    Derm nails are brittle elongated hypertrophic white-yellow discoloration with subungual debris x 10.  There is an increased thickness and the nails are approximately 2 mm.  There is thinning of the skin noted and loss of turgor also present bilaterally the dark discoloration that was present on the right hallux nail at last visit has grown a little further distally on the nail.    Ortho there is slight foot drop noted on the left extremity noticed with ambulation.  There is good muscle strength for plantarflexion of the foot dorsiflexion is extremely weak on the left side.  Hammertoe deformities are also present on digits 2 through 5 bilaterally.    Neuro is unchanged from his last visit on 10/22/2024.  Administrative Statements   I have spent a total time of 15 minutes in caring for this patient on the day of the visit/encounter including Risks and benefits of tx options, Instructions for management, Patient and family education, Importance of tx compliance, Risk factor reductions, Counseling / Coordination of care, Documenting in the medical record, and Obtaining or reviewing history  .

## 2025-01-06 ENCOUNTER — CLINICAL SUPPORT (OUTPATIENT)
Dept: CARDIOLOGY CLINIC | Facility: CLINIC | Age: 80
End: 2025-01-06
Payer: COMMERCIAL

## 2025-01-06 ENCOUNTER — TELEPHONE (OUTPATIENT)
Age: 80
End: 2025-01-06

## 2025-01-06 ENCOUNTER — RESULTS FOLLOW-UP (OUTPATIENT)
Dept: CARDIOLOGY CLINIC | Facility: CLINIC | Age: 80
End: 2025-01-06

## 2025-01-06 DIAGNOSIS — I63.9 CEREBROVASCULAR ACCIDENT (CVA), UNSPECIFIED MECHANISM (HCC): ICD-10-CM

## 2025-01-06 DIAGNOSIS — R93.1 ABNORMAL ECHOCARDIOGRAM: ICD-10-CM

## 2025-01-06 PROCEDURE — 93228 REMOTE 30 DAY ECG REV/REPORT: CPT | Performed by: INTERNAL MEDICINE

## 2025-01-06 NOTE — TELEPHONE ENCOUNTER
Caller: Gold     Doctor: Sai     Reason for call: Patient calling in regards to nuclear stress test. He states he was in contact with his insurance and they advised him they are unaware of this test and did not approve it. He is requesting information regarding how to proceed. He states cell is the best number to call him on. Listed below.    Call back#: [phone]

## 2025-01-06 NOTE — TELEPHONE ENCOUNTER
-I was able to call and speak with patient about ambulatory event monitor results.  I also recommended initiation of oral anticoagulation.  After discussion with patient he does not wish to initiate oral anticoagulation at this time but does understand my recommendations for this.  He wished to discuss this with his neurologist as well to see if they believe it would also be beneficial.  Overall patient notes that he is doing reasonably well and we will follow-up in office visit or sooner if necessary.  I did encourage patient that if he were to change his mind to let our office know so that oral anticoagulation could be ordered.

## 2025-01-06 NOTE — TELEPHONE ENCOUNTER
Caller: Scooter     Doctor: Dr. Gibbs     Reason for call: patient is due to have a nuclear stress test on 1/9 and is requesting a call back to let him know if he should be getting testing done since he received a call from Dr. Gibbs recently that his zio showed signs of AFIB. Patient researched that if he was in AFIB, the nuclear stress test could be harmful. Please advise    Call back#: [phone]

## 2025-01-08 NOTE — TELEPHONE ENCOUNTER
Pt has a lot of confusion as to what medications to hold for his stress test. Could someone please call him and document what he is to hold and for how long. Spouse called and asked what pt is to hold. When providing instructions, pt was in the background reporting someone told him to hold the albuterol and atenolol yesterday which is not documented anywhere and not typically held for a nuclear stress test. Please review and call the patient/spouse back

## 2025-01-08 NOTE — TELEPHONE ENCOUNTER
Called and spoke to patient and his wife.  Gave him/her Dr Gibbs's recommendations regarding the Atenolol.  Told him to bring his rescue inhaler to his stress test appointment and he can take his Breztri prior to the stress test as instructed.  I also did tell them that they should receive a call this afternoon from a nurse to go over instructions and arrival time.

## 2025-01-09 ENCOUNTER — HOSPITAL ENCOUNTER (OUTPATIENT)
Dept: NUCLEAR MEDICINE | Facility: HOSPITAL | Age: 80
End: 2025-01-09
Payer: COMMERCIAL

## 2025-01-09 ENCOUNTER — HOSPITAL ENCOUNTER (OUTPATIENT)
Dept: NON INVASIVE DIAGNOSTICS | Facility: HOSPITAL | Age: 80
Discharge: HOME | End: 2025-01-09
Payer: COMMERCIAL

## 2025-01-09 DIAGNOSIS — R93.1 ABNORMAL ECHOCARDIOGRAM: ICD-10-CM

## 2025-01-09 DIAGNOSIS — I10 ESSENTIAL HYPERTENSION: Chronic | ICD-10-CM

## 2025-01-09 DIAGNOSIS — I63.9 CEREBROVASCULAR ACCIDENT (CVA), UNSPECIFIED MECHANISM (HCC): ICD-10-CM

## 2025-01-09 LAB
RATE PRESSURE PRODUCT: NORMAL
SL CV REST NUCLEAR ISOTOPE DOSE: 10.2 MCI
SL CV STRESS NUCLEAR ISOTOPE DOSE: 32.9 MCI
SL CV STRESS RECOVERY BP: NORMAL MMHG
SL CV STRESS RECOVERY HR: 97 BPM
SL CV STRESS RECOVERY O2 SAT: 99 %
SPECT HRT GATED+EF W RNC IV: 65 %
STRESS ANGINA INDEX: 1
STRESS BASELINE BP: NORMAL MMHG
STRESS BASELINE HR: 86 BPM
STRESS O2 SAT REST: 98 %
STRESS PEAK HR: 122 BPM
STRESS POST O2 SAT PEAK: 99 %
STRESS POST PEAK BP: 142 MMHG

## 2025-01-09 PROCEDURE — 93018 CV STRESS TEST I&R ONLY: CPT | Performed by: INTERNAL MEDICINE

## 2025-01-09 PROCEDURE — A9502 TC99M TETROFOSMIN: HCPCS

## 2025-01-09 PROCEDURE — 78452 HT MUSCLE IMAGE SPECT MULT: CPT

## 2025-01-09 PROCEDURE — 93017 CV STRESS TEST TRACING ONLY: CPT

## 2025-01-09 PROCEDURE — 93016 CV STRESS TEST SUPVJ ONLY: CPT | Performed by: INTERNAL MEDICINE

## 2025-01-09 PROCEDURE — 78452 HT MUSCLE IMAGE SPECT MULT: CPT | Performed by: INTERNAL MEDICINE

## 2025-01-09 RX ORDER — REGADENOSON 0.08 MG/ML
0.4 INJECTION, SOLUTION INTRAVENOUS ONCE
Status: COMPLETED | OUTPATIENT
Start: 2025-01-09 | End: 2025-01-09

## 2025-01-09 RX ADMIN — REGADENOSON 0.4 MG: 0.08 INJECTION, SOLUTION INTRAVENOUS at 10:02

## 2025-01-10 ENCOUNTER — PATIENT MESSAGE (OUTPATIENT)
Dept: NEUROSURGERY | Facility: CLINIC | Age: 80
End: 2025-01-10

## 2025-01-10 LAB
CHEST PAIN STATEMENT: NORMAL
MAX DIASTOLIC BP: 76 MMHG
MAX PREDICTED HEART RATE: 141 BPM
PROTOCOL NAME: NORMAL
REASON FOR TERMINATION: NORMAL
STRESS POST EXERCISE DUR MIN: 3 MIN
STRESS POST EXERCISE DUR SEC: 0 SEC
STRESS POST PEAK HR: 122 BPM
STRESS POST PEAK SYSTOLIC BP: 142 MMHG
TARGET HR FORMULA: NORMAL
TEST INDICATION: NORMAL

## 2025-01-14 ENCOUNTER — TELEPHONE (OUTPATIENT)
Age: 80
End: 2025-01-14

## 2025-01-14 ENCOUNTER — DOCUMENTATION (OUTPATIENT)
Dept: OBGYN CLINIC | Facility: MEDICAL CENTER | Age: 80
End: 2025-01-14

## 2025-01-14 NOTE — TELEPHONE ENCOUNTER
Pt called after receiving message from us regarding follow up appt, follow up appt was done on 11/6/24 and noted indicate follow up is now PRN.

## 2025-01-28 ENCOUNTER — OFFICE VISIT (OUTPATIENT)
Dept: NEUROLOGY | Facility: CLINIC | Age: 80
End: 2025-01-28
Payer: COMMERCIAL

## 2025-01-28 ENCOUNTER — TELEPHONE (OUTPATIENT)
Dept: NEUROLOGY | Facility: CLINIC | Age: 80
End: 2025-01-28

## 2025-01-28 VITALS
WEIGHT: 164.8 LBS | HEIGHT: 72 IN | HEART RATE: 74 BPM | DIASTOLIC BLOOD PRESSURE: 72 MMHG | OXYGEN SATURATION: 98 % | BODY MASS INDEX: 22.32 KG/M2 | SYSTOLIC BLOOD PRESSURE: 138 MMHG | TEMPERATURE: 98 F

## 2025-01-28 DIAGNOSIS — I63.9 CVA (CEREBROVASCULAR ACCIDENT) (HCC): Primary | ICD-10-CM

## 2025-01-28 PROCEDURE — 99214 OFFICE O/P EST MOD 30 MIN: CPT | Performed by: PSYCHIATRY & NEUROLOGY

## 2025-01-28 PROCEDURE — G2211 COMPLEX E/M VISIT ADD ON: HCPCS | Performed by: PSYCHIATRY & NEUROLOGY

## 2025-01-28 NOTE — PROGRESS NOTES
Name: Gold Van      : 1945      MRN: [id number]  Encounter Provider: Theodora Plaza MD  Encounter Date: 2025   Encounter department: [facility] [place]  :  Assessment & Plan  CVA (cerebrovascular accident) (HCC)  Scooter Van is a very pleasant 79-year-old male with history of left ICA aneurysm, asthma, COPD, hypertension, left foot drop, hyperlipidemia, neuropathy, peripheral vascular disease, pulmonary nodule and type 2 diabetes who presents as a hospital follow up. Patient seen in 2024 after presenting with loss of coordination in the setting of a COPD exacerbation. Imaging with significant and severe intracranial stenosis and atherosclerotic diseases as well as multiple foci of acute infarcts involving bilateral frontoparietal cortices.  It was thought that his stroke may have been due to hypoperfusion in the setting of his illness.  Hypercoagulability from COVID infection could have played a role as well.  It was recommended that patient continue dual antiplatelet for 90 days and then continue on aspirin monotherapy. Outpatient cardiac monitoring showed evidence of afib. Patient was called and AC was ordered however patient wanted to think about it first.     Impression: Ischemia due to cardioembolism vs hypoperfusion     Plan:  Will focus on stroke risk prevention  Antiplatelet therapy: aspirin 81 mg, stop plavix   Recommend starting eliquis, jarrett cardiologist who will send prescription   Goal LDL < 70  Continue atorvastatin 40 mg  Monitor blood pressure with goal <130/80  Counseled on lifestyle measures to reduce stroke burden if applicable, such as:  Smoking cessation   Reducing alcohol intake  Encouraging physical activity  Encouraging weight loss  A low-carbohydrate diet reducing the intake of foods rich in carbohydrates, such as bread, pasta, rice, and sugary snacks.Emphasizes foods that are high in protein, healthy fats, and non-starchy  vegetables  Regular follow-up with their PCP  If they have any stroke-like symptoms including sudden painless loss of vision or double vision, difficulty speaking or swallowing, vertigo/room spinning that does not quickly resolve, or weakness/numbness affecting 1 side of the face or body he should proceed by ambulance to the nearest emergency room immediately.  Follow-up in 1 month               History of Present Illness   HPI    Scooter Van is a very pleasant 79-year-old male with history of left ICA aneurysm, asthma, COPD, hypertension, left foot drop, hyperlipidemia, neuropathy, peripheral vascular disease, pulmonary nodule and type 2 diabetes who presents as a hospital follow up.     Scooter was seen by inpatient neurology on 10/01/2024 after presenting with stroke like symptoms. Patient initially presented on 9/29 with acute respiratory failure with hypoxia due to COPD exacerbation in the setting of recent COVID infection.  While patient was working with PT the morning of 10/1 they noted he seemed less coordinated on the right side compared to his left.  Patient reported that he felt he could not use his right arm is much as his left arm but also felt he may have slept on it.  Patient underwent stroke workup.    Workup:  CT Brain: Findings this for acute or subacute cortical infarct in the left precentral gyrus. No intracranial hemorrhage or mass effect.  Chronic right centrum semiovale lacunar infarct. Mild microangiopathic change. Acute or subacute sinusitis as described.     CT Angiography: Severe multifocal atherosclerotic stenosis in the cavernous and supraclinoid segments of bilateral internal carotid arteries  Severe atherosclerotic stenosis in pre-PICA segments of bilateral intracranial vertebral arteries. Focal moderate mid basilar artery stenosis. Atherosclerotic stenosis of the cervical ICAs, about 55% on the left and 50% on the right. No significant stenosis in the cervical vertebral arteries.No  "apparent intracranial aneurysm.    MRI BRAIN WO CONTRAST (Final) 10/1/2024 12:54 PM    Impression  1.  Multiple foci of acute infarcts involving bilateral frontoparietal cortices and subcortical white matter (left greater than right). No mass effect or hemorrhagic transformation.  2.  Old right centrum semiovale lacunar infarct. Mild chronic microangiopathic change.  3.  Acute or subacute sinus disease as described.    HbA1c 9.7    LDL 63    Elevated D-dimer    Cardiology phone call (12/31/2024): \"I was able to call and speak with patient about result message sent from event monitor concerning for atrial fibrillation. I did recommend patient be transition from dual antiplatelet therapy to oral anticoagulation given elevated [id number] score and recent stroke. Patient denied any significant symptoms and states that this time he wishes to think about this prior to transitioning and will let our office know. Will follow-up with him once complete report has been filed as he is still wearing monitor at this time. Patient will also discuss with his wife and let me know if he changes his mind so that oral anticoagulation can be prescribed. \"    Overall, patient with significant bilateral intracranial stenosis and watershed appearance of some of his infarcts.  It was thought that his stroke may have been due to hypoperfusion in the setting of his illness.  Hypercoagulability from COVID infection could have played a role as well.  It was recommended that patient continue dual antiplatelet for 90 days and then continue on aspirin monotherapy.    Interval history:  Was having difficulty walking   Did rehab after discharge.  Uses cane to ambulate.   Difficulty getting balance prior to initiating walking       Hesitates to drive  Review of Systems   Constitutional:  Negative for appetite change, fatigue and fever.   HENT: Negative.  Negative for hearing loss, tinnitus, trouble swallowing and voice change.    Eyes: Negative.  " Negative for photophobia, pain and visual disturbance.   Respiratory: Negative.  Negative for shortness of breath.    Cardiovascular: Negative.  Negative for palpitations.   Gastrointestinal: Negative.  Negative for nausea and vomiting.   Endocrine: Negative.  Negative for cold intolerance.   Genitourinary: Negative.  Negative for dysuria, frequency and urgency.   Musculoskeletal:  Negative for back pain, gait problem, myalgias, neck pain and neck stiffness.   Skin: Negative.  Negative for rash.   Allergic/Immunologic: Negative.    Neurological: Negative.  Negative for dizziness, tremors, seizures, syncope, facial asymmetry, speech difficulty, weakness, light-headedness, numbness and headaches.        Pt is concerned about easy bruising while on Plavix.   Hematological: Negative.  Does not bruise/bleed easily.   Psychiatric/Behavioral: Negative.  Negative for confusion, hallucinations and sleep disturbance.    All other systems reviewed and are negative.   I have personally reviewed the MA's review of systems and made changes as necessary.         Objective   Ht 6' (1.829 m)   BMI 21.97 kg/m²     Physical Exam  Neurological Exam

## 2025-01-28 NOTE — TELEPHONE ENCOUNTER
Spoke with spouse and mentioned that Dr. Plaza spoke to Scooter's cardiologist in regards to the Eliquis prescription and they would give him a call.

## 2025-01-30 ENCOUNTER — APPOINTMENT (OUTPATIENT)
Dept: LAB | Facility: HOSPITAL | Age: 80
End: 2025-01-30
Payer: COMMERCIAL

## 2025-01-30 ENCOUNTER — TELEPHONE (OUTPATIENT)
Age: 80
End: 2025-01-30

## 2025-01-30 DIAGNOSIS — E11.9 TYPE 2 DIABETES MELLITUS WITHOUT COMPLICATION, WITHOUT LONG-TERM CURRENT USE OF INSULIN (HCC): ICD-10-CM

## 2025-01-30 DIAGNOSIS — D64.9 ANEMIA, UNSPECIFIED TYPE: ICD-10-CM

## 2025-01-30 LAB
BASOPHILS # BLD AUTO: 0.04 THOUSANDS/ΜL (ref 0–0.1)
BASOPHILS NFR BLD AUTO: 1 % (ref 0–1)
EOSINOPHIL # BLD AUTO: 0.08 THOUSAND/ΜL (ref 0–0.61)
EOSINOPHIL NFR BLD AUTO: 1 % (ref 0–6)
ERYTHROCYTE [DISTWIDTH] IN BLOOD BY AUTOMATED COUNT: 14.8 % (ref 11.6–15.1)
EST. AVERAGE GLUCOSE BLD GHB EST-MCNC: 255 MG/DL
HBA1C MFR BLD: 10.5 %
HCT VFR BLD AUTO: 37.7 % (ref 36.5–49.3)
HGB BLD-MCNC: 12.1 G/DL (ref 12–17)
IMM GRANULOCYTES # BLD AUTO: 0.13 THOUSAND/UL (ref 0–0.2)
IMM GRANULOCYTES NFR BLD AUTO: 2 % (ref 0–2)
LYMPHOCYTES # BLD AUTO: 2.06 THOUSANDS/ΜL (ref 0.6–4.47)
LYMPHOCYTES NFR BLD AUTO: 24 % (ref 14–44)
MCH RBC QN AUTO: 29.2 PG (ref 26.8–34.3)
MCHC RBC AUTO-ENTMCNC: 32.1 G/DL (ref 31.4–37.4)
MCV RBC AUTO: 91 FL (ref 82–98)
MONOCYTES # BLD AUTO: 0.85 THOUSAND/ΜL (ref 0.17–1.22)
MONOCYTES NFR BLD AUTO: 10 % (ref 4–12)
NEUTROPHILS # BLD AUTO: 5.43 THOUSANDS/ΜL (ref 1.85–7.62)
NEUTS SEG NFR BLD AUTO: 62 % (ref 43–75)
NRBC BLD AUTO-RTO: 0 /100 WBCS
PLATELET # BLD AUTO: 267 THOUSANDS/UL (ref 149–390)
PMV BLD AUTO: 9.4 FL (ref 8.9–12.7)
RBC # BLD AUTO: 4.15 MILLION/UL (ref 3.88–5.62)
WBC # BLD AUTO: 8.59 THOUSAND/UL (ref 4.31–10.16)

## 2025-01-30 PROCEDURE — 83036 HEMOGLOBIN GLYCOSYLATED A1C: CPT

## 2025-01-30 PROCEDURE — 36415 COLL VENOUS BLD VENIPUNCTURE: CPT

## 2025-01-30 PROCEDURE — 85025 COMPLETE CBC W/AUTO DIFF WBC: CPT

## 2025-01-30 NOTE — TELEPHONE ENCOUNTER
Caller: Gold Van    Doctor: Dr. Gibbs    Reason for call: Pt had visit with Neurologist who confirmed Eliquis 5mg 2x/day would be best for Pt as well.     If the Dr agrees, Pt asking if there are any samples for him to try it for 2 weeks before having the RX sent to mail order?    Call back#: [phone]

## 2025-01-30 NOTE — TELEPHONE ENCOUNTER
Spoke with patient who will  2 week supply from the [place] office.  Will call in the next week or so to confirm if he would like to continue on the medication before the prescription is sent in.

## 2025-02-12 ENCOUNTER — APPOINTMENT (OUTPATIENT)
Dept: LAB | Facility: HOSPITAL | Age: 80
End: 2025-02-12

## 2025-02-12 DIAGNOSIS — Z01.84 IMMUNITY STATUS TESTING: ICD-10-CM

## 2025-02-12 LAB — RUBV IGG SERPL IA-ACNC: >450 IU/ML

## 2025-02-12 PROCEDURE — 86765 RUBEOLA ANTIBODY: CPT

## 2025-02-12 PROCEDURE — 86735 MUMPS ANTIBODY: CPT

## 2025-02-12 PROCEDURE — 86762 RUBELLA ANTIBODY: CPT

## 2025-02-12 PROCEDURE — 86480 TB TEST CELL IMMUN MEASURE: CPT

## 2025-02-12 PROCEDURE — 86787 VARICELLA-ZOSTER ANTIBODY: CPT

## 2025-02-12 PROCEDURE — 36415 COLL VENOUS BLD VENIPUNCTURE: CPT

## 2025-02-13 LAB
GAMMA INTERFERON BACKGROUND BLD IA-ACNC: 0.04 IU/ML
M TB IFN-G BLD-IMP: NEGATIVE
M TB IFN-G CD4+ BCKGRND COR BLD-ACNC: 0 IU/ML
M TB IFN-G CD4+ BCKGRND COR BLD-ACNC: 0.01 IU/ML
MEV IGG SER QL IA: >300 AU/ML
MEV IGG SER QL IA: POSITIVE
MITOGEN IGNF BCKGRD COR BLD-ACNC: 9.96 IU/ML
MUV IGG SER QL IA: 110 AU/ML
MUV IGG SER QL IA: POSITIVE
VZV IGG SER QL IA: 5.91 S/CO
VZV IGG SER QL IA: POSITIVE

## 2025-02-15 NOTE — ASSESSMENT & PLAN NOTE
Scooter Van is a very pleasant 79-year-old male with history of left ICA aneurysm, asthma, COPD, hypertension, left foot drop, hyperlipidemia, neuropathy, peripheral vascular disease, pulmonary nodule and type 2 diabetes who presents as a hospital follow up. Patient seen in October 2024 after presenting with loss of coordination in the setting of a COPD exacerbation. Imaging with significant and severe intracranial stenosis and atherosclerotic diseases as well as multiple foci of acute infarcts involving bilateral frontoparietal cortices.  It was thought that his stroke may have been due to hypoperfusion in the setting of his illness.  Hypercoagulability from COVID infection could have played a role as well.  It was recommended that patient continue dual antiplatelet for 90 days and then continue on aspirin monotherapy. Outpatient cardiac monitoring showed evidence of afib. Patient was called and AC was ordered however patient wanted to think about it first.     Impression: Ischemia due to cardioembolism vs hypoperfusion     Plan:  Will focus on stroke risk prevention  Antiplatelet therapy: aspirin 81 mg, stop plavix   Recommend starting eliquis, messaged cardiologist who will send prescription   Goal LDL < 70  Continue atorvastatin 40 mg  Monitor blood pressure with goal <130/80  Counseled on lifestyle measures to reduce stroke burden if applicable, such as:  Smoking cessation   Reducing alcohol intake  Encouraging physical activity  Encouraging weight loss  A low-carbohydrate diet reducing the intake of foods rich in carbohydrates, such as bread, pasta, rice, and sugary snacks.Emphasizes foods that are high in protein, healthy fats, and non-starchy vegetables  Regular follow-up with their PCP  If they have any stroke-like symptoms including sudden painless loss of vision or double vision, difficulty speaking or swallowing, vertigo/room spinning that does not quickly resolve, or weakness/numbness affecting 1 side of  the face or body he should proceed by ambulance to the nearest emergency room immediately.  Follow-up in 1 month

## 2025-03-12 ENCOUNTER — TELEPHONE (OUTPATIENT)
Age: 80
End: 2025-03-12

## 2025-03-12 NOTE — TELEPHONE ENCOUNTER
Patient called to request FU appointment 4/2/25 12:30 Dr. Plaza to be changed to a VV via [facility]; stated provider agreed can be a VV at LOV 1/28/25. Unable to change visit type; advised will forward request.    Please assist,    Thank you  
No

## 2025-03-18 ENCOUNTER — OFFICE VISIT (OUTPATIENT)
Dept: PODIATRY | Facility: CLINIC | Age: 80
End: 2025-03-18
Payer: COMMERCIAL

## 2025-03-18 VITALS — HEIGHT: 72 IN | BODY MASS INDEX: 22.21 KG/M2 | WEIGHT: 164 LBS

## 2025-03-18 DIAGNOSIS — B35.1 ONYCHOMYCOSIS: ICD-10-CM

## 2025-03-18 DIAGNOSIS — E11.8 TYPE 2 DIABETES MELLITUS WITH COMPLICATION, WITHOUT LONG-TERM CURRENT USE OF INSULIN (HCC): ICD-10-CM

## 2025-03-18 DIAGNOSIS — Z79.01 LONG TERM CURRENT USE OF ANTICOAGULANT: ICD-10-CM

## 2025-03-18 DIAGNOSIS — G62.9 NEUROPATHY: Primary | ICD-10-CM

## 2025-03-18 DIAGNOSIS — L97.512 ULCER OF RIGHT FOOT WITH FAT LAYER EXPOSED (HCC): ICD-10-CM

## 2025-03-18 DIAGNOSIS — L97.411 NON-PRESSURE CHRONIC ULCER OF RIGHT HEEL AND MIDFOOT LIMITED TO BREAKDOWN OF SKIN (HCC): ICD-10-CM

## 2025-03-18 PROCEDURE — 99212 OFFICE O/P EST SF 10 MIN: CPT | Performed by: PODIATRIST

## 2025-03-18 PROCEDURE — 11721 DEBRIDE NAIL 6 OR MORE: CPT | Performed by: PODIATRIST

## 2025-03-18 RX ORDER — APIXABAN 5 MG/1
TABLET, FILM COATED ORAL
COMMUNITY
Start: 2025-02-10

## 2025-03-18 NOTE — PROGRESS NOTES
Name: Gold Van      : 1945      MRN: [id number]  Encounter Provider: Gino Holloway DPM  Encounter Date: 3/18/2025   Encounter department: [facility] [place]  :  Assessment & Plan  Neuropathy         Onychomycosis       Debride mycotic nails and thin the nail plates x 10 with the use of a nail nipper manually and an electric Dremel bur was used to reduce the thickness of the nail beds and smoothed the distal aspect of the nails.   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  The ulcer was dressed with calcium alginate with silver and a Band-Aid  Patient is to dress the wound daily with the same calcium and calcium alginate product.  Ulcer of right foot with fat layer exposed (HCC)       The eschar that was present on the dorsal aspect and the fibrotic tissue that was seen under it was removed with the use of a traumatic pickups and a 312 blade.  Some of the fibrotic tissue that was present was left behind but there was good bleeding tissue seen.  The wound was covered with a piece of calcium alginate with silver and dry sterile dressings  Patient is to do dressing change daily  He can shower but leave the dressings on and remove them after he showers and pat the area dry and reapply the wound dressings.  Discussed proper shoe gear, daily inspections of feet, and general foot health with patient. Patient has Q9  findings and is recommended for at risk foot care every 9-10 weeks.    Patients most recent complete clinical foot exam was on: 1/3/2025      Return in about 1 week (around 3/25/2025).   Return  in about 10 weeks    History of Present Illness   HPI  Gold Van is a 79 y.o. male who presents with chief complaint of painful thick nails on both feet and he is concerned over 2 wounds that are present on the right foot.  He is a diabetic who has peripheral neuropathy along with peripheral vascular disease.  He has been on long-term anticoagulation therapy.Patient presents for at-risk foot care.  Patient has no acute concerns today.  Patient has significant lower extremity risk due to neuropathy, parasthesia, edema, and trophic skin changes to the lower extremity.   History obtained from: patient    Review of Systems  Medical History Reviewed by provider this encounter:     .  Current Outpatient Medications on File Prior to Visit   Medication Sig Dispense Refill    albuterol (ACCUNEB) 1.25 MG/3ML nebulizer solution Take 1.25 mg by nebulization every 6 (six) hours as needed for wheezing      albuterol (PROVENTIL HFA,VENTOLIN HFA) 90 mcg/act inhaler Ventolin HFA 90 mcg/actuation aerosol inhaler      amLODIPine (NORVASC) 5 mg tablet Take 5 mg by mouth 2 (two) times a day      aspirin 81 mg chewable tablet Chew 1 tablet (81 mg total) daily      atenolol (TENORMIN) 25 mg tablet Take 25 mg by mouth daily      atorvastatin (LIPITOR) 40 mg tablet Take 1 tablet (40 mg total) by mouth every evening 30 tablet 0    Budeson-Glycopyrrol-Formoterol (Breztri Aerosphere) 160-9-4.8 MCG/ACT AERO Take 2 puffs by mouth Every 12 hours      Eliquis 5 MG       famotidine (PEPCID) 20 mg tablet Take 1 tablet (20 mg total) by mouth 2 (two) times a day      glipiZIDE (GLUCOTROL XL) 2.5 mg 24 hr tablet Take 2.5 mg by mouth 2 (two) times a day      guaiFENesin (MUCINEX) 600 mg 12 hr tablet Take 1 tablet (600 mg total) by mouth 2 (two) times a day      lisinopril (ZESTRIL) 20 mg tablet Take 20 mg by mouth 2 (two) times a day      LORazepam (ATIVAN) 0.5 mg tablet Take 0.5 mg by mouth if needed      metFORMIN (GLUCOPHAGE-XR) 750 mg 24 hr  tablet Take 1 tablet (750 mg total) by mouth daily with dinner      montelukast (SINGULAIR) 10 mg tablet Take 1 tablet (10 mg total) by mouth daily at bedtime      Multiple Vitamins-Minerals (Multivitamin Adults) TABS Take 1 tablet by mouth daily      predniSONE 5 mg tablet Take 1 tablet (5 mg total) by mouth every other day      predniSONE 5 mg tablet Take 3 tablets (15 mg total) by mouth every other day       No current facility-administered medications on file prior to visit.      Social History     Tobacco Use    Smoking status: Former    Smokeless tobacco: Never    Tobacco comments:     quit about 25 years ago   Vaping Use    Vaping status: Never Used   Substance and Sexual Activity    Alcohol use: Never    Drug use: Never    Sexual activity: Yes     Partners: Female        Objective   Ht 6' (1.829 m)   Wt 74.4 kg (164 lb)   BMI 22.24 kg/m²      Physical Exam  Vascular status is 0/4 DP PT negative digital hair, normal distal cooling, delayed capillary refill with edema present bilaterally.  The capillary refill is approximately 3 to 4 seconds and the edema is +4 bilaterally.    Derm nails are brittle elongated hypertrophic white discoloration with subungual debris x 10.  There is an increased thickness in the nails of approximately 1 to 2 mm.  There is white flaky tissue present on the dorsal and plantar aspects of both feet going up into the lower extremity.  No signs of any fissures or dried blood present.  There is loss of turgor and thinning of the skin noted bilaterally.  Dependent rubor and venous insufficiency are noted bilaterally.  The ulcers are located on the right foot.  The first ulcer is located on the posterior aspect of the right heel measuring approximately 0.3 x 0.3 cm.  Is a circular lesion with fibrotic tissue present in the center hypertrophic tissue surrounds the ulcer no undermining or tunneling is noted.  Good bleeding tissue was noted upon debridement no outward signs of any  infection.  No erythema no drainage and no odor is noted.  Second ulcer is located on the lateral aspect of the right foot in the area of the head of the fifth metatarsal.  The ulcer measures approximately 0.5 x 0.5 cm.  The central area had wet eschar present with fibrotic tissue beneath the eschar.  There is no undermining or tunneling noted.  There is a small amount of hypertrophic tissue surrounding the ulcer.  No signs of any infection, no erythema and no drainage is noted.  The base was firm upon debridement of the fibrotic tissue.    Ortho hammertoe deformities are present on the fourth and fifth digits bilaterally and there is dropfoot deformity noted on the left extremity.     Neuro light touch is in place 0.5 monofilament test was performed and it was decreased in the toe areas especially on the right side.  The sites that were tested were the plantar aspect of the hallux, plantar aspect of the third and fourth toes, submet 3, and the plantar aspect of the arch.    Administrative Statements   I have spent a total time of 18 minutes in caring for this patient on the day of the visit/encounter including Risks and benefits of tx options, Instructions for management, Patient and family education, Importance of tx compliance, Counseling / Coordination of care, Documenting in the medical record, and Obtaining or reviewing history  .

## 2025-03-25 ENCOUNTER — OFFICE VISIT (OUTPATIENT)
Dept: PODIATRY | Facility: CLINIC | Age: 80
End: 2025-03-25
Payer: COMMERCIAL

## 2025-03-25 ENCOUNTER — TELEPHONE (OUTPATIENT)
Age: 80
End: 2025-03-25

## 2025-03-25 VITALS — BODY MASS INDEX: 22.21 KG/M2 | HEIGHT: 72 IN | WEIGHT: 164 LBS

## 2025-03-25 DIAGNOSIS — G62.9 NEUROPATHY: Primary | ICD-10-CM

## 2025-03-25 DIAGNOSIS — L97.512 ULCER OF RIGHT FOOT WITH FAT LAYER EXPOSED (HCC): ICD-10-CM

## 2025-03-25 DIAGNOSIS — E11.8 TYPE 2 DIABETES MELLITUS WITH COMPLICATION, WITHOUT LONG-TERM CURRENT USE OF INSULIN (HCC): ICD-10-CM

## 2025-03-25 DIAGNOSIS — M21.372 FOOT DROP, LEFT: ICD-10-CM

## 2025-03-25 DIAGNOSIS — L03.031 CELLULITIS OF TOE OF RIGHT FOOT: ICD-10-CM

## 2025-03-25 PROCEDURE — 99213 OFFICE O/P EST LOW 20 MIN: CPT | Performed by: PODIATRIST

## 2025-03-25 RX ORDER — CEPHALEXIN 500 MG/1
500 CAPSULE ORAL EVERY 8 HOURS SCHEDULED
Qty: 27 CAPSULE | Refills: 0 | Status: SHIPPED | OUTPATIENT
Start: 2025-03-25 | End: 2025-04-03

## 2025-03-25 NOTE — TELEPHONE ENCOUNTER
Caller: Scooter     Doctor and/or Office: Dr. Holloway     #: [phone]    [place]: AppointmentPatient called in and stated he cannot make April 4th at 9:00 he has a appt. He stated he can make it later.  Please advise thank you

## 2025-03-25 NOTE — ASSESSMENT & PLAN NOTE
Orders:    Diabetic Shoe    Diabetic Shoe Inserts    cephalexin (KEFLEX) 500 mg capsule; Take 1 capsule (500 mg total) by mouth every 8 (eight) hours for 9 days

## 2025-03-25 NOTE — ASSESSMENT & PLAN NOTE
Lab Results   Component Value Date    HGBA1C 10.5 (H) 01/30/2025       Orders:    Diabetic Shoe    Diabetic Shoe Inserts

## 2025-03-25 NOTE — TELEPHONE ENCOUNTER
He sees the pt in the [place] office, I'll forward to them. Looks like he already has a later time.

## 2025-03-25 NOTE — PROGRESS NOTES
Name: Gold Van      : 1945      MRN: [id number]  Encounter Provider: Gino Holloway DPM  Encounter Date: 3/25/2025   Encounter department: [facility] [place]  :  Assessment & Plan  Neuropathy    Orders:    Diabetic Shoe    Diabetic Shoe Inserts    Type 2 diabetes mellitus with complication, without long-term current use of insulin (HCC)    Lab Results   Component Value Date    HGBA1C 10.5 (H) 2025       Orders:    Diabetic Shoe    Diabetic Shoe Inserts    Ulcer of right foot with fat layer exposed (HCC)    Orders:    Diabetic Shoe    Diabetic Shoe Inserts    cephalexin (KEFLEX) 500 mg capsule; Take 1 capsule (500 mg total) by mouth every 8 (eight) hours for 9 days    Foot drop, left    Orders:    Diabetic Shoe    Cellulitis of toe of right foot    Orders:    cephalexin (KEFLEX) 500 mg capsule; Take 1 capsule (500 mg total) by mouth every 8 (eight) hours for 9 days    Due to the fibrotic tissue to good bleeding tissue on the lateral side of the fifth met head on the right foot and also on the posterior aspect of the right heel.  Dressed the areas with calcium alginate with silver on both sides and covered with gauze and rolled gauze.  Patient is to change dressing every other day.    Return in about 1 week (around 2025).     History of Present Illness   HPI  Gold Van is a 79 y.o. male who presents with ulcers present on his right foot 1 on the lateral aspect near the fifth met head and the second 1 on the posterior aspect of the heel.  He is a diabetic with peripheral neuropathy.  History obtained from: patient    Review of Systems  Medical History Reviewed by provider this encounter:     .  Current Outpatient Medications on File Prior to Visit   Medication Sig Dispense Refill    albuterol (ACCUNEB) 1.25 MG/3ML nebulizer solution Take 1.25 mg by nebulization every 6 (six) hours as needed for wheezing      albuterol (PROVENTIL HFA,VENTOLIN HFA) 90 mcg/act inhaler  Ventolin HFA 90 mcg/actuation aerosol inhaler      amLODIPine (NORVASC) 5 mg tablet Take 5 mg by mouth 2 (two) times a day      aspirin 81 mg chewable tablet Chew 1 tablet (81 mg total) daily      atenolol (TENORMIN) 25 mg tablet Take 25 mg by mouth daily      atorvastatin (LIPITOR) 40 mg tablet Take 1 tablet (40 mg total) by mouth every evening 30 tablet 0    Budeson-Glycopyrrol-Formoterol (Breztri Aerosphere) 160-9-4.8 MCG/ACT AERO Take 2 puffs by mouth Every 12 hours      Eliquis 5 MG       famotidine (PEPCID) 20 mg tablet Take 1 tablet (20 mg total) by mouth 2 (two) times a day      glipiZIDE (GLUCOTROL XL) 2.5 mg 24 hr tablet Take 2.5 mg by mouth 2 (two) times a day      guaiFENesin (MUCINEX) 600 mg 12 hr tablet Take 1 tablet (600 mg total) by mouth 2 (two) times a day      lisinopril (ZESTRIL) 20 mg tablet Take 20 mg by mouth 2 (two) times a day      LORazepam (ATIVAN) 0.5 mg tablet Take 0.5 mg by mouth if needed      metFORMIN (GLUCOPHAGE-XR) 750 mg 24 hr tablet Take 1 tablet (750 mg total) by mouth daily with dinner      montelukast (SINGULAIR) 10 mg tablet Take 1 tablet (10 mg total) by mouth daily at bedtime      Multiple Vitamins-Minerals (Multivitamin Adults) TABS Take 1 tablet by mouth daily      predniSONE 5 mg tablet Take 1 tablet (5 mg total) by mouth every other day      predniSONE 5 mg tablet Take 3 tablets (15 mg total) by mouth every other day       No current facility-administered medications on file prior to visit.      Social History     Tobacco Use    Smoking status: Former    Smokeless tobacco: Never    Tobacco comments:     quit about 25 years ago   Vaping Use    Vaping status: Never Used   Substance and Sexual Activity    Alcohol use: Never    Drug use: Never    Sexual activity: Yes     Partners: Female        Objective   Ht 6' (1.829 m)   Wt 74.4 kg (164 lb)   BMI 22.24 kg/m²      Physical Exam  Vascular status is unchanged from his visit last week.  The edema that was present on last  visit is significantly improved.  The edema now is +1 pitting and the venous insufficiency has also slightly improved compared to last visit.    Derm ulcer is located on the lateral aspect of the fifth met head measures approximately 1 cm x 1 cm and has a fibrotic base with granular tissue and good bleeding tissue beneath it.  There is erythema surrounding the wound especially on the dorsal aspect.  It stays localized approximately 1 cm from the ulcer to the top of the foot no increase in warmth is noted and there was minimal drainage on the bandage when it was removed.  The second ulcer is located on the posterior aspect of the right heel measuring approximately 0.3 x 0.4 cm with a fibrotic base and good bleeding tissue beneath it no signs of any infection.  There is no signs of undermining or tunneling at either site.    Administrative Statements   I have spent a total time of 15 minutes in caring for this patient on the day of the visit/encounter including Risks and benefits of tx options, Instructions for management, Patient and family education, Importance of tx compliance, Counseling / Coordination of care, Documenting in the medical record, and Obtaining or reviewing history  .

## 2025-03-27 ENCOUNTER — TELEPHONE (OUTPATIENT)
Age: 80
End: 2025-03-27

## 2025-03-27 NOTE — TELEPHONE ENCOUNTER
Caller: Patient- Scooter     Doctor: Dr. Holloway    Reason for call: Patient is calling in stating that he was seen in the office on 3/25 with the Dr relating his right foot. The patient stated that he has wounds/sores on his right foot that the Dr has been treating. He is wanting to know as to what he is supposed to be using to change the dressing. I did read verbatim the orders listed from the visit, but he is still unsure as to what the discussion was relating to the triple antibiotic that he was supposed to get but he never did? Patient is wanting to clarify as to what further he should be doing, please reach out to patient relating this.    Call back#: [phone]

## 2025-04-02 ENCOUNTER — TELEMEDICINE (OUTPATIENT)
Dept: NEUROLOGY | Facility: CLINIC | Age: 80
End: 2025-04-02
Payer: COMMERCIAL

## 2025-04-02 DIAGNOSIS — I65.29 INTRACRANIAL CAROTID STENOSIS: ICD-10-CM

## 2025-04-02 DIAGNOSIS — I63.031 CEREBRAL INFARCTION DUE TO THROMBOSIS OF RIGHT CAROTID ARTERY (HCC): ICD-10-CM

## 2025-04-02 DIAGNOSIS — I63.9 CVA (CEREBROVASCULAR ACCIDENT) (HCC): Primary | ICD-10-CM

## 2025-04-02 PROCEDURE — 99212 OFFICE O/P EST SF 10 MIN: CPT | Performed by: PSYCHIATRY & NEUROLOGY

## 2025-04-02 PROCEDURE — G2211 COMPLEX E/M VISIT ADD ON: HCPCS | Performed by: PSYCHIATRY & NEUROLOGY

## 2025-04-02 RX ORDER — BLOOD SUGAR DIAGNOSTIC
STRIP MISCELLANEOUS
COMMUNITY
Start: 2025-03-26

## 2025-04-02 NOTE — PROGRESS NOTES
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     To review, 2024 patient presented with loss of coordination in the setting og COPD exacerbation. Imaging with severe intracranial stenosis and atherosclerotic diseases as well as multiple foci of acute infarcts involving bilateral frontoparietal cortices. Findings thought to be due to hypoperfusion in the setting of COPD exacerbation. Outpatient cardiac monitor showed evidence of afib. After some deliberation patient was started on Eliquis.     Plan:  - Will order a VAS for surveillance of vessel disease.   - Continue Eliquis   - Continue aspirin 81 mg given extensive stenosis   - Continue atorvastatin 40 mg   - Follow up in 3-4 months     Orders:    VAS carotid complete study; Future    CVA (cerebrovascular accident) (HCC)    Orders:    VAS carotid complete study; Future    Intracranial carotid stenosis    Orders:    VAS carotid complete study; Future        History of Present Illness     HPI    Scooter Van is a very pleasant 79-year-old male with history of left ICA aneurysm, asthma, COPD, hypertension, left foot drop, hyperlipidemia, neuropathy, peripheral vascular disease, pulmonary nodule and type 2 diabetes who presents for stroke follow up.     Initial visit (2025):  Patient seen in 2024 after presenting with loss of coordination in the setting of a COPD exacerbation. Imaging with significant and severe intracranial stenosis  and atherosclerotic diseases as well as multiple foci of acute infarcts involving bilateral frontoparietal cortices.  It was thought that his stroke may have been due to hypoperfusion in the setting of his illness.  Hypercoagulability from COVID infection could have played a role as well.  It was recommended that patient continue dual antiplatelet for 90 days and then continue on aspirin monotherapy. Outpatient cardiac monitoring showed evidence of afib. Patient was called and AC was ordered however patient wanted to think about it first.      Impression: Ischemia due to cardioembolism vs hypoperfusion      Recommended continuing aspirin and starting anticoagulation.     Workup:  CT Brain (10/1/2024): Findings this for acute or subacute cortical infarct in the left precentral gyrus. No intracranial hemorrhage or mass effect.  Chronic right centrum semiovale lacunar infarct. Mild microangiopathic change. Acute or subacute sinusitis as described.     CT Angiography (10/01/2024): Severe multifocal atherosclerotic stenosis in the cavernous and supraclinoid segments of bilateral internal carotid arteries  Severe atherosclerotic stenosis in pre-PICA segments of bilateral intracranial vertebral arteries. Focal moderate mid basilar artery stenosis. Atherosclerotic stenosis of the cervical ICAs, about 55% on the left and 50% on the right. No significant stenosis in the cervical vertebral arteries.No apparent intracranial aneurysm.     MRI BRAIN WO CONTRAST (Final) 10/1/2024 12:54 PM     Impression  1.  Multiple foci of acute infarcts involving bilateral frontoparietal cortices and subcortical white matter (left greater than right). No mass effect or hemorrhagic transformation.  2.  Old right centrum semiovale lacunar infarct. Mild chronic microangiopathic change.  3.  Acute or subacute sinus disease as described.     HbA1c 9.7     LDL 63     Elevated D-dimer      Interval history:    Feeling well   No new stroke like sx.    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   HENT: Negative.  Negative for hearing loss, tinnitus, trouble swallowing and voice change.    Eyes: Negative.  Negative for photophobia, pain and visual disturbance.   Respiratory: Negative.  Negative for shortness of breath.    Cardiovascular: Negative.  Negative for palpitations.   Gastrointestinal: Negative.  Negative for nausea and vomiting.   Endocrine: Negative.  Negative for cold intolerance.   Genitourinary: Negative.  Negative for dysuria, frequency and urgency.   Musculoskeletal:  Negative for back pain, gait problem, myalgias, neck pain and neck stiffness.   Skin: Negative.  Negative for rash.   Allergic/Immunologic: Negative.    Neurological:  Negative for dizziness, tremors, seizures, syncope, facial asymmetry, speech difficulty, weakness, light-headedness, numbness and headaches.   Hematological: Negative.  Does not bruise/bleed easily.   Psychiatric/Behavioral: Negative.  Negative for confusion, hallucinations and sleep disturbance.    All other systems reviewed and are negative.      Objective   There were no vitals taken for this visit.    Physical Exam    Administrative Statements   Encounter provider Theodora Plaza MD    The Patient is located at Home and in the following state in which I hold an active license [place].    The patient was identified by name and date of birth. Gold Van was informed that this is a telemedicine visit and that the visit is being conducted through the Epic Embedded platform. He agrees to proceed..  My office door was closed. No one else was in the room.  He acknowledged consent and understanding of privacy and security of the video platform. The patient has agreed to participate and understands they can  discontinue the visit at any time.

## 2025-04-04 ENCOUNTER — OFFICE VISIT (OUTPATIENT)
Dept: PODIATRY | Facility: CLINIC | Age: 80
End: 2025-04-04
Payer: COMMERCIAL

## 2025-04-04 VITALS — BODY MASS INDEX: 22.21 KG/M2 | WEIGHT: 164 LBS | HEIGHT: 72 IN

## 2025-04-04 DIAGNOSIS — E11.8 TYPE 2 DIABETES MELLITUS WITH COMPLICATION, WITHOUT LONG-TERM CURRENT USE OF INSULIN (HCC): Primary | ICD-10-CM

## 2025-04-04 DIAGNOSIS — L97.512 ULCER OF RIGHT FOOT WITH FAT LAYER EXPOSED (HCC): ICD-10-CM

## 2025-04-04 DIAGNOSIS — G62.9 NEUROPATHY: ICD-10-CM

## 2025-04-04 DIAGNOSIS — L97.411 NON-PRESSURE CHRONIC ULCER OF RIGHT HEEL AND MIDFOOT LIMITED TO BREAKDOWN OF SKIN (HCC): ICD-10-CM

## 2025-04-04 PROCEDURE — 99212 OFFICE O/P EST SF 10 MIN: CPT | Performed by: PODIATRIST

## 2025-04-06 NOTE — PROGRESS NOTES
Name: Gold Van      : 1945      MRN: [id number]  Encounter Provider: Gino Holloway DPM  Encounter Date: 2025   Encounter department: [facility] [place]  :  Assessment & Plan  Type 2 diabetes mellitus with complication, without long-term current use of insulin ([facility])    Lab Results   Component Value Date    HGBA1C 10.5 (H) 2025       Orders:    Ambulatory Referral to Wound Care; Future    Neuropathy    Orders:    Ambulatory Referral to Wound Care; Future    Ulcer of right foot with fat layer exposed (HCC)    Orders:    Ambulatory Referral to Wound Care; Future  Debrided some of the fibrotic tissue and garbage tissue from the base of the ulcer on the outside part of his right foot.  The area was then covered with calcium alginate with silver and a Band-Aid.  Patient is to continue doing the calcium alginate with silver and a Band-Aid on a daily basis until he gets to the wound center.  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  Patient is to continue doing the same until he gets to the wound center.  Patient is to shower with the dressings on, removed the dressings after his shower pat the area dry and wait 5 minutes before reapplying.  Patient is to increase his protein intake with boost or Ensure.  Patient is to limit his walking currently until the wounds are healed.    Return in about 1 week (around 2025).     History of Present Illness   HPI  Gold Van is a 79 y.o. male who presents for follow-up of ulcers present on his right foot.  Patient has been covering it with calcium alginate on a daily basis and changing the dressing every other day.  He has also been using triple antibiotic or some form of antibiotic ointment also.   He relates that he has been limiting his walking since the last visit.  His wife was present in the room for today's visit.  Patient is a diabetic.  History obtained from: patient and patient's Significant Other    Review of Systems  Medical History Reviewed by provider this encounter:     .  Current Outpatient Medications on File Prior to Visit   Medication Sig Dispense Refill    albuterol (ACCUNEB) 1.25 MG/3ML nebulizer solution Take 1.25 mg by nebulization every 6 (six) hours as needed for wheezing      albuterol (PROVENTIL HFA,VENTOLIN HFA) 90 mcg/act inhaler Ventolin HFA 90 mcg/actuation aerosol inhaler      amLODIPine (NORVASC) 5 mg tablet Take 5 mg by mouth 2 (two) times a day      aspirin 81 mg chewable tablet Chew 1 tablet (81 mg total) daily      atenolol (TENORMIN) 25 mg tablet Take 25 mg by mouth daily      atorvastatin (LIPITOR) 40 mg tablet Take 1 tablet (40 mg total) by mouth every evening 30 tablet 0    Budeson-Glycopyrrol-Formoterol (Breztri Aerosphere) 160-9-4.8 MCG/ACT AERO Take 2 puffs by mouth Every 12 hours      Eliquis 5 MG       famotidine (PEPCID) 20 mg tablet Take 1 tablet (20 mg total) by mouth 2 (two) times a day      glipiZIDE (GLUCOTROL XL) 2.5 mg 24 hr tablet Take 2.5 mg by mouth 2 (two) times a day      guaiFENesin (MUCINEX) 600 mg 12 hr tablet Take 1 tablet (600 mg total) by mouth 2 (two) times a day      lisinopril (ZESTRIL) 20 mg tablet Take 20 mg by mouth 2 (two) times a day      LORazepam (ATIVAN) 0.5 mg tablet Take 0.5 mg by mouth if needed      metFORMIN (GLUCOPHAGE-XR) 750 mg 24 hr tablet Take 1 tablet (750 mg total) by mouth daily with dinner      montelukast (SINGULAIR) 10 mg tablet Take 1 tablet (10 mg total) by mouth daily at bedtime      Multiple Vitamins-Minerals (Multivitamin Adults) TABS Take 1 tablet by mouth daily      OneTouch Ultra test strip       predniSONE 5 mg tablet Take 1 tablet (5 mg total) by mouth every other day      predniSONE 5 mg tablet Take 3 tablets  (15 mg total) by mouth every other day (Patient not taking: Reported on 4/2/2025)       No current facility-administered medications on file prior to visit.      Social History     Tobacco Use    Smoking status: Former    Smokeless tobacco: Never    Tobacco comments:     quit about 25 years ago   Vaping Use    Vaping status: Never Used   Substance and Sexual Activity    Alcohol use: Never    Drug use: Never    Sexual activity: Yes     Partners: Female        Objective   Ht 6' (1.829 m)   Wt 74.4 kg (164 lb)   BMI 22.24 kg/m²      Physical Exam  Vascular status is a faint 1/4 DP 0/4 PT negative digital hair slightly delayed capillary refill with edema present right extremity.  Capillary refill is approximately 2 to 3 seconds.  Pitting edema is present on the right extremity of +2.    Derm the ulcers are located on the right foot the smaller one is located on the posterior aspect of the heel measuring approximately 0.5 x 0.4 cm it has decreased in depth since his last visit has a small area of fibrotic tissue present centrally pink tissue surrounding it no signs of any infection and no bogginess to the center.  Good bleeding tissue was noted beneath the fibrotic tissue.  The second ulcer is located on the lateral aspect of the right foot measuring approximately 1 cm x 1 cm with fibrotic tissue at the base also fibrous tissue is present at the base.  Some granular tissue was seen around the edges of the wound.  There is a significant decrease in depth noted today compared to last visit the erythema that was present at last visit has also resolved.  The necrotic tissue that was present 2 weeks ago is also resolved.  No tunneling undermining or probing to bone is noted on this area.  Good bleeding tissue was noted beneath the fibrous tissue.    Administrative Statements   I have spent a total time of 15 minutes in caring for this patient on the day of the visit/encounter including Risks and benefits of tx options,  Instructions for management, Patient and family education, Importance of tx compliance, Risk factor reductions, Counseling / Coordination of care, Documenting in the medical record, and Obtaining or reviewing history  .

## 2025-04-06 NOTE — ASSESSMENT & PLAN NOTE
Lab Results   Component Value Date    HGBA1C 10.5 (H) 01/30/2025       Orders:    Ambulatory Referral to Wound Care; Future

## 2025-04-06 NOTE — ASSESSMENT & PLAN NOTE
Orders:    Ambulatory Referral to Wound Care; Future  Debrided some of the fibrotic tissue and garbage tissue from the base of the ulcer on the outside part of his right foot.  The area was then covered with calcium alginate with silver and a Band-Aid.  Patient is to continue doing the calcium alginate with silver and a Band-Aid on a daily basis until he gets to the wound center.

## 2025-04-08 ENCOUNTER — OFFICE VISIT (OUTPATIENT)
Facility: HOSPITAL | Age: 80
End: 2025-04-08
Payer: COMMERCIAL

## 2025-04-08 VITALS
SYSTOLIC BLOOD PRESSURE: 161 MMHG | BODY MASS INDEX: 22.21 KG/M2 | WEIGHT: 164 LBS | HEIGHT: 72 IN | TEMPERATURE: 98.3 F | DIASTOLIC BLOOD PRESSURE: 75 MMHG | RESPIRATION RATE: 20 BRPM | HEART RATE: 82 BPM

## 2025-04-08 DIAGNOSIS — E11.621 DIABETIC ULCER OF RIGHT MIDFOOT ASSOCIATED WITH TYPE 2 DIABETES MELLITUS, WITH FAT LAYER EXPOSED (HCC): Primary | ICD-10-CM

## 2025-04-08 DIAGNOSIS — E11.621 DIABETIC ULCER OF RIGHT HEEL ASSOCIATED WITH TYPE 2 DIABETES MELLITUS, WITH FAT LAYER EXPOSED (HCC): ICD-10-CM

## 2025-04-08 DIAGNOSIS — L97.412 DIABETIC ULCER OF RIGHT MIDFOOT ASSOCIATED WITH TYPE 2 DIABETES MELLITUS, WITH FAT LAYER EXPOSED (HCC): Primary | ICD-10-CM

## 2025-04-08 DIAGNOSIS — R68.89 ABNORMAL ANKLE BRACHIAL INDEX (ABI): ICD-10-CM

## 2025-04-08 DIAGNOSIS — S81.801D TRAUMATIC OPEN WOUND OF RIGHT LOWER LEG WITH DELAYED HEALING: ICD-10-CM

## 2025-04-08 DIAGNOSIS — L97.412 DIABETIC ULCER OF RIGHT HEEL ASSOCIATED WITH TYPE 2 DIABETES MELLITUS, WITH FAT LAYER EXPOSED (HCC): ICD-10-CM

## 2025-04-08 PROCEDURE — 97597 DBRDMT OPN WND 1ST 20 CM/<: CPT | Performed by: STUDENT IN AN ORGANIZED HEALTH CARE EDUCATION/TRAINING PROGRAM

## 2025-04-08 PROCEDURE — 99204 OFFICE O/P NEW MOD 45 MIN: CPT | Performed by: STUDENT IN AN ORGANIZED HEALTH CARE EDUCATION/TRAINING PROGRAM

## 2025-04-08 PROCEDURE — 99214 OFFICE O/P EST MOD 30 MIN: CPT | Performed by: STUDENT IN AN ORGANIZED HEALTH CARE EDUCATION/TRAINING PROGRAM

## 2025-04-08 RX ORDER — LIDOCAINE 40 MG/G
CREAM TOPICAL ONCE
Status: COMPLETED | OUTPATIENT
Start: 2025-04-08 | End: 2025-04-08

## 2025-04-08 RX ADMIN — LIDOCAINE: 40 CREAM TOPICAL at 13:11

## 2025-04-08 NOTE — PROGRESS NOTES
"Debridement   Wound 04/08/25 Diabetic Ulcer Heel Right     Date/Time: 4/8/2025 10:15 AM  Universal Protocol:  procedure performed by consultantConsent: Verbal consent obtained.  Consent given by: patient  Time out: Immediately prior to procedure a \"time out\" was called to verify the correct patient, procedure, equipment, support staff and site/side marked as required.  Patient understanding: patient states understanding of the procedure being performed  Patient identity confirmed: verbally with patient    Debridement Details  Performed by: PA  Debridement type: selective  Pain control: lidocaine 4%    Post-debridement measurements  Length (cm): 0.5  Width (cm): 0.2  Depth (cm): 0.3  Percent debrided: 100%  Surface Area (cm^2): 0.1  Area Debrided (cm^2): 0.1  Volume (cm^3): 0.03    Devitalized tissue debrided: slough  Instrument(s) utilized: curette  Bleeding: small  Hemostasis obtained with: pressure  Response to treatment: procedure was tolerated well        "

## 2025-04-08 NOTE — PROGRESS NOTES
Patient ID: Gold Van is a 79 y.o. male Date of Birth 1945       Chief Complaint   Patient presents with    New Patient Visit     Right foot started about 4 weeks ago and has seen Podiatrist 3 times for the wound, sanchez silver alginate and bordered foam, right lower leg abrasion from hitting leg on machine at gym about 4 weeks ago, and heel wound covered with a bandaid started about 6 weeks ago        Allergies:  Ciprofloxacin, Sulfamethoxazole, Trimethoprim, Dexamethasone, Triamcinolone, and Bactrim [sulfamethoxazole-trimethoprim]    Diagnosis:   Diagnosis ICD-10-CM Associated Orders   1. Diabetic ulcer of right midfoot associated with type 2 diabetes mellitus, with fat layer exposed (HCC)  E11.621 lidocaine (LMX) 4 % cream    L97.412 CBC and differential     Basic metabolic panel     Sedimentation rate, automated     C-reactive protein     XR foot 3+ vw right     Wound cleansing and dressings Lower;Right Leg     VAS ARTERIAL DUPLEX- LOWER LIMB BILATERAL     Wound cleansing and dressings     Wound Procedure Treatment     Wound Procedure Treatment Lower;Right Leg     collagenase (SANTYL) ointment      2. Diabetic ulcer of right heel associated with type 2 diabetes mellitus, with fat layer exposed (HCC)  E11.621 XR foot 3+ vw right    L97.412 Wound cleansing and dressings Lower;Right Leg     VAS ARTERIAL DUPLEX- LOWER LIMB BILATERAL     Wound cleansing and dressings     Wound Procedure Treatment     Wound Procedure Treatment Lower;Right Leg     collagenase (SANTYL) ointment      3. Abnormal ankle brachial index (RIC)  R68.89 Wound cleansing and dressings Lower;Right Leg     VAS ARTERIAL DUPLEX- LOWER LIMB BILATERAL     Wound cleansing and dressings     Wound Procedure Treatment     Wound Procedure Treatment Lower;Right Leg      4. Traumatic open wound of right lower leg with delayed healing  S81.801D            Assessment  & Plan:    Evaluation of DFU of the R lateral foot overlying the 5th metatarsal  head. Ulceration has spongy necrotic tissue present. Drainage is small. Undermining is present. Periwound with very mild erythema and increased edema of the foot compared to L foot.   Selective debridement, as below.   Santyl enzymatic debridement prescribed. Utilize medihoney until able to obtain Santyl. Apply once daily and keep covered.   Consider cast cover for showering and cleansing ulcerations with gentle soap and water with dressing changes.   Given proximity of underlying bone with periwound edema will pursue further work-up for osteomyelitis including CBC,BMP, ESR, CRP and XR of foot initially. MRI will be ordered pending results of XR.   Surgical shoe provided. Limit ambulation to avoid pressure/friction on site of ulcer.    A1C results reviewed with the patient today. Uncontrolled at 10.5. Dicussed uncontrolled glucose will inhibit healing. Declines endocrinology referral at this time. Glipizide was recently increased, reports A1c will be rechecked within the next few weeks.   Monitor for worsening signs of infection including erythema, streaking, pain, fevers, chills, malaise. Notify office or report to ED should these occur.   R RIC non-compressible when taken in office today. L RIC diminished at 0.6. Will order formal arterial testing for further evaluation given diminished L RIC in setting of poorly controlled DM.   Evaluation of R heel DFU. Small ulceration probes to soft tissue. Slough present overlying granulation tissue. Minimal drainage. No periwound maceration or erythema.   Surgical debridement, as documented.   Santyl prescribed. Apply daily. Keep covered. Apply Medihoney until able to obtain Santyl.   Evaluation of traumatic wound of the R lower leg with delayed healing. There is exudate present overlying granulation tissue. Small drainage. Periwound with scar tissue formation. No erythema present to suggest surrounding soft tissue infection.  Selective debridement, as below. Apply medihoney  daily and keep site covered.   F/u in one week with Dr. Galeas. Instructed to call if any questions or concerns arise in meantime.            Subjective:   [birth year]79 y/o M with PMHx of type 2 DM with neuropathy, HTN, asthma, COPD, CVA, presents for evaluation of two ulcerations on his R foot. He was recently seen by his podiatrist on 03/18/25 at which time ulcerations were noted on the R heel and dorsal foot and were debrided. Silver alginate application with daily dressing changes were recommended to both ulcerations. He was started on a course of Keflex related to the wounds on 03/25/25. His most recent f/u with podiatry was on 04/04/25 at which time he was referred to wound care for further evaluation and instructed to continue keeping the sites covered with silver algiante until seen. Presently he is showering with his dressings on and then changing them immediately after the shower. On evaluation today, pt notes the site is being kept covered with alginate. He is wearing Heydude style shoes that are wider, he reports these shoes squeeze his foot less than his other options. Currently his wounds are not draining very much. Dressings stick. Denies any significant pain, fevers, chills, malaise. In addition, he has a traumatic wound on his R lower leg that he sustained about one month ago while walking on the treadmill. Presently his blood glucose is managed by his PCP, he takes his sugars via finger stick each morning. His dose of glipizide was recently increased and he suspects his A1c will be improved when taken next. Does not smoke.           The following portions of the patient's history were reviewed and updated as appropriate:   Patient Active Problem List   Diagnosis    Type 2 diabetes mellitus with complication, without long-term current use of insulin (HCC)    Essential hypertension    Asthma    Hyperlipidemia    Hyponatremia    Pulmonary nodule    Aneurysm of cavernous portion of left internal carotid  artery    Non-recurrent bilateral inguinal hernia without obstruction or gangrene    Pruritus    History of pneumothorax    Acute respiratory failure with hypoxia (HCC)    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HCC)    Shortness of breath    COVID-19    Acute respiratory insufficiency    Dysmetria    CVA (cerebrovascular accident) (Formerly [facility])    Abnormal echocardiogram    Gastroesophageal reflux disease without esophagitis    Impaired mobility and activities of daily living    Neuropathy    Foot drop, left    Moderate protein-calorie malnutrition (HCC)    Other specified peripheral vascular diseases (Formerly [facility])    Ulcer of right foot with fat layer exposed ([facility]ly [facility])     Past Medical History:   Diagnosis Date    Asthma     COVID-19     CVA (cerebral vascular accident) (HCC)     Diabetes mellitus (HCC)     Environmental allergies     Hyperlipidemia     Hypertension     Macular degeneration 07/13/2020    right eye    Orthostatic hypotension     Osteopenia     Pneumonia     Pneumothorax     Sinusitis      Past Surgical History:   Procedure Laterality Date    CARPAL TUNNEL RELEASE Left 08/2024    CATARACT EXTRACTION Left 07/2024    COLONOSCOPY      KNEE CARTILAGE SURGERY Right 1964    VASECTOMY      WISDOM TOOTH EXTRACTION      x4     Family History   Problem Relation Age of Onset    Asthma Mother     Emphysema Mother     Cancer Father     Asthma Brother     Cancer Brother      Social History     Socioeconomic History    Marital status: /Civil Union     Spouse name: Not on file    Number of children: Not on file    Years of education: Not on file    Highest education level: Not on file   Occupational History    Not on file   Tobacco Use    Smoking status: Former    Smokeless tobacco: Never    Tobacco comments:     quit about 25 years ago   Vaping Use    Vaping status: Never Used   Substance and Sexual Activity    Alcohol use: Never    Drug use: Never    Sexual activity: Yes     Partners: Female   Other Topics Concern    Not on file   Social History  Narrative    Daily caffeine use- 1 cup of tea, 2 cups of coffee     Social Drivers of Health     Financial Resource Strain: Not on file   Food Insecurity: No Food Insecurity (10/17/2024)    Nursing - Inadequate Food Risk Classification     Worried About Running Out of Food in the Last Year: Never true     Ran Out of Food in the Last Year: Never true     Ran Out of Food in the Last Year: Not on file   Transportation Needs: No Transportation Needs (10/17/2024)    PRAPARE - Transportation     Lack of Transportation (Medical): No     Lack of Transportation (Non-Medical): No   Physical Activity: Not on file   Stress: Not on file   Social Connections: Unknown (6/18/2024)    Received from [employer]    Social Space Apart     How often do you feel lonely or isolated from those around you? (Adult - for ages 18 years and over): Not on file   Intimate Partner Violence: Not on file   Housing Stability: Low Risk  (10/17/2024)    Housing Stability Vital Sign     Unable to Pay for Housing in the Last Year: No     Number of Times Moved in the Last Year: 0     Homeless in the Last Year: No       Current Outpatient Medications:     collagenase (SANTYL) ointment, Apply topically daily To wounds of R lateral foot and heel, Disp: 30 g, Rfl: 1    Eliquis 5 MG, , Disp: , Rfl:     albuterol (ACCUNEB) 1.25 MG/3ML nebulizer solution, Take 1.25 mg by nebulization every 6 (six) hours as needed for wheezing, Disp: , Rfl:     albuterol (PROVENTIL HFA,VENTOLIN HFA) 90 mcg/act inhaler, Ventolin HFA 90 mcg/actuation aerosol inhaler, Disp: , Rfl:     amLODIPine (NORVASC) 5 mg tablet, Take 5 mg by mouth 2 (two) times a day, Disp: , Rfl:     aspirin 81 mg chewable tablet, Chew 1 tablet (81 mg total) daily, Disp: , Rfl:     atenolol (TENORMIN) 25 mg tablet, Take 25 mg by mouth daily, Disp: , Rfl:     atorvastatin (LIPITOR) 40 mg tablet, Take 1 tablet (40 mg total) by mouth every evening, Disp: 30 tablet, Rfl: 0    Budeson-Glycopyrrol-Formoterol (Breztri  Aerosphere) 160-9-4.8 MCG/ACT AERO, Take 2 puffs by mouth Every 12 hours, Disp: , Rfl:     famotidine (PEPCID) 20 mg tablet, Take 1 tablet (20 mg total) by mouth 2 (two) times a day, Disp: , Rfl:     glipiZIDE (GLUCOTROL XL) 2.5 mg 24 hr tablet, Take 2.5 mg by mouth 2 (two) times a day, Disp: , Rfl:     guaiFENesin (MUCINEX) 600 mg 12 hr tablet, Take 1 tablet (600 mg total) by mouth 2 (two) times a day, Disp: , Rfl:     lisinopril (ZESTRIL) 20 mg tablet, Take 20 mg by mouth 2 (two) times a day, Disp: , Rfl:     LORazepam (ATIVAN) 0.5 mg tablet, Take 0.5 mg by mouth if needed, Disp: , Rfl:     metFORMIN (GLUCOPHAGE-XR) 750 mg 24 hr tablet, Take 1 tablet (750 mg total) by mouth daily with dinner, Disp: , Rfl:     montelukast (SINGULAIR) 10 mg tablet, Take 1 tablet (10 mg total) by mouth daily at bedtime, Disp: , Rfl:     Multiple Vitamins-Minerals (Multivitamin Adults) TABS, Take 1 tablet by mouth daily, Disp: , Rfl:     OneTouch Ultra test strip, , Disp: , Rfl:     predniSONE 5 mg tablet, Take 1 tablet (5 mg total) by mouth every other day, Disp: , Rfl:     predniSONE 5 mg tablet, Take 3 tablets (15 mg total) by mouth every other day (Patient not taking: Reported on 4/2/2025), Disp: , Rfl:   No current facility-administered medications for this visit.    Review of Systems      Objective:  /75 (Patient Position: Sitting)   Pulse 82   Temp 98.3 °F (36.8 °C) (Temporal)   Resp 20   Ht 6' (1.829 m)   Wt 74.4 kg (164 lb)   BMI 22.24 kg/m²   Pain Score: 0-No pain     Physical Exam  Vitals reviewed.   Constitutional:       Appearance: He is normal weight.   Cardiovascular:      Rate and Rhythm: Normal rate.      Pulses:           Dorsalis pedis pulses are 2+ on the right side.        Posterior tibial pulses are 2+ on the right side.      Comments: R RIC non-compressible   L RIC 0.6     Taken in office on 04/08/25.   Pulmonary:      Effort: Pulmonary effort is normal. No respiratory distress.   Musculoskeletal:       Right lower leg: Edema (R foot) present.        Feet:    Feet:      Right foot:      Protective Sensation: 10 sites tested.  10 sites sensed.      Skin integrity: Ulcer and erythema (mild, lateral foot, periwound) present.   Skin:     Findings: Wound present.             Comments: See full wound assessment   Neurological:      Mental Status: He is alert.                Wound 04/08/25 Diabetic Ulcer Foot Lateral;Right (Active)   Wound Image   04/08/25 1021   Enter Mills score: Mills Grade 1: Partial or full-thickness ulcer (superficial) 04/08/25 1041   Wound Description Slough;Eschar 04/08/25 1041   Non-staged Wound Description Full thickness 04/08/25 1041   Wound Length (cm) 1.2 cm 04/08/25 1041   Wound Width (cm) 1.1 cm 04/08/25 1041   Wound Depth (cm) 0.1 cm 04/08/25 1041   Wound Surface Area (cm^2) 1.32 cm^2 04/08/25 1041   Wound Volume (cm^3) 0.132 cm^3 04/08/25 1041   Calculated Wound Volume (cm^3) 0.13 cm^3 04/08/25 1041   Number of underminings 1 04/08/25 1041   Undermining 1 0.5 04/08/25 1041   Undermining 1 is depth extending from 12-6 04/08/25 1041   Drainage Amount Small 04/08/25 1041   Drainage Description Tan 04/08/25 1041   Miranda-wound Assessment Erythema;Edema;White 04/08/25 1041   Treatments Irrigation with NSS 04/08/25 1041   Dressing Status Intact 04/08/25 1041       Wound 04/08/25 Traumatic Leg Lower;Right (Active)   Wound Image   04/08/25 1020   Wound Description Granulation tissue;Slough 04/08/25 1044   Non-staged Wound Description Full thickness 04/08/25 1044   Wound Length (cm) 1 cm 04/08/25 1044   Wound Width (cm) 0.4 cm 04/08/25 1044   Wound Depth (cm) 0.1 cm 04/08/25 1044   Wound Surface Area (cm^2) 0.4 cm^2 04/08/25 1044   Wound Volume (cm^3) 0.04 cm^3 04/08/25 1044   Calculated Wound Volume (cm^3) 0.04 cm^3 04/08/25 1044   Drainage Amount Small 04/08/25 1044   Drainage Description Serosanguineous 04/08/25 1044   Miranda-wound Assessment Scar Tissue 04/08/25 1044   Treatments Irrigation  "with NSS 04/08/25 1044   Patient Tolerance Tolerated well 04/08/25 1044   Dressing Status Intact 04/08/25 1044       Wound 04/08/25 Diabetic Ulcer Heel Right (Active)   Wound Image   04/08/25 1024   Enter Mills score: Mills Grade 1: Partial or full-thickness ulcer (superficial) 04/08/25 1045   Wound Description Slough 04/08/25 1045   Non-staged Wound Description Full thickness 04/08/25 1045   Wound Length (cm) 0.5 cm 04/08/25 1045   Wound Width (cm) 0.2 cm 04/08/25 1045   Wound Depth (cm) 0.2 cm 04/08/25 1045   Wound Surface Area (cm^2) 0.1 cm^2 04/08/25 1045   Wound Volume (cm^3) 0.02 cm^3 04/08/25 1045   Calculated Wound Volume (cm^3) 0.02 cm^3 04/08/25 1045   Drainage Amount Scant 04/08/25 1045   Drainage Description Sanguineous 04/08/25 1045   Miranda-wound Assessment Callus 04/08/25 1045   Treatments Irrigation with NSS 04/08/25 1045   Dressing Status Intact 04/08/25 1045                       Debridement   Wound 04/08/25 Traumatic Leg Lower;Right     Date/Time: 4/8/2025 10:59 AM  Universal Protocol:  procedure performed by consultantConsent: Verbal consent obtained.  Consent given by: patient  Time out: Immediately prior to procedure a \"time out\" was called to verify the correct patient, procedure, equipment, support staff and site/side marked as required.  Patient understanding: patient states understanding of the procedure being performed  Patient identity confirmed: verbally with patient    Debridement Details  Performed by: PA  Debridement type: selective  Pain control: lidocaine 4%    Post-debridement measurements  Length (cm): 1  Width (cm): 0.4  Depth (cm): 0.1  Percent debrided: 90%  Surface Area (cm^2): 0.4  Area Debrided (cm^2): 0.36  Volume (cm^3): 0.04    Devitalized tissue debrided: exudate  Instrument(s) utilized: curette  Bleeding: small  Hemostasis obtained with: pressure  Response to treatment: procedure was tolerated well    Debridement   Wound 04/08/25 Diabetic Ulcer Foot Lateral;Right " "    Date/Time: 4/8/2025 10:15 AM  Universal Protocol:  procedure performed by consultantConsent: Verbal consent obtained.  Consent given by: patient  Time out: Immediately prior to procedure a \"time out\" was called to verify the correct patient, procedure, equipment, support staff and site/side marked as required.  Patient understanding: patient states understanding of the procedure being performed  Patient identity confirmed: verbally with patient    Debridement Details  Performed by: PA  Debridement type: selective  Pain control: lidocaine 4%    Post-debridement measurements  Length (cm): 1.2  Width (cm): 1.1  Depth (cm): 0.1  Percent debrided: 15%  Surface Area (cm^2): 1.32  Area Debrided (cm^2): 0.2  Volume (cm^3): 0.13    Devitalized tissue debrided: periwound skin contributing to undermining  Instrument(s) utilized: blade and forceps  Bleeding: small  Hemostasis obtained with: pressure  Response to treatment: procedure was tolerated well                   Wound Instructions:  Orders Placed This Encounter   Procedures    Wound cleansing and dressings Lower;Right Leg     Right lower leg     Wash your hands with soap and water.  Remove old dressing, discard into plastic bag and place in trash.  Cleanse the wound with unscented soap and water  prior to applying a clean dressing. Do not use tissue or cotton balls. Do not scrub the wound. Pat dry using gauze.  Shower yes -- with cast cover to keep the dressing dry     Apply Medihoney to the right leg wound.  Cover with gauze.  Secure with becki and tape   Change dressing daily     Standing Status:   Future     Expiration Date:   4/15/2025    Wound cleansing and dressings     Right lateral foot, right heel   Wash your hands with soap and water. Remove old dressing, discard into plastic bag and place in trash. Cleanse the wound with gentle soap and water (unscented Dove) prior to applying a clean dressing. Do not use tissue or cotton balls. Do not scrub the wound. Pat " dry using gauze. Do not use harsh cleanser/hydrogen peroxide     Shower yes - with cast cover only to keep the dressing dry. Cast cover is available for purchase online     Apply Santyl to the lateral foot and heel wounds   Cover with gauze   Secure with becki and tape   Change dressing daily and as needed for drainage leakage or displacement   **Santyl will be ordered for you from a specialty pharmacy. They will call you for approval for the price. If it is to expensive please call wound management center for a different treatment.**    Surgical shoe to right foot   wear for all ambulation and transfers      Medihoney applied to the wound today (not santyl) continue to use the medihoney until the santyl is delivered to your home     Obtain X-Ray and blood work as ordered as soon as possible   Obtain Vascular study as ordered Increase protein in your diet with each meal. 3 to 4 servings per day - Meat, chicken, eggs, nut, greek yogurt, legumes, and lentils are good sources of protein.   Keep weight and pressure off wounds     Follow up in  1   weeks at Wound Management center     Please call the Wound Management Center Monday through Friday between 8-430 for any questions. Monitor for any signs or symptoms of infection such as increase redness or pain, fever/chills, nausea/vomiting, malodor, or increased drainage.     If you have any signs or symptoms please call the wound center, if after hours or on holiday/weekend call your primary care provider or go to the emergency department to be evaluated     Standing Status:   Future     Expiration Date:   4/15/2025    Wound Procedure Treatment     This order was created via procedure documentation    Wound Procedure Treatment Lower;Right Leg     This order was created via procedure documentation    XR foot 3+ vw right     Standing Status:   Future     Expected Date:   4/8/2025     Expiration Date:   4/8/2029     Scheduling Instructions:      Bring along any outside films  "relating to this procedure.          CBC and differential     This is a patient instruction: This test is non-fasting. Please drink two glasses of water morning of bloodwork.        Standing Status:   Future     Expiration Date:   6/8/2026    Basic metabolic panel     This is a patient instruction: Patient fasting for 8 hours or longer recommended.     Standing Status:   Future     Expiration Date:   6/8/2026    Sedimentation rate, automated     Standing Status:   Future     Expiration Date:   6/8/2026    C-reactive protein     Standing Status:   Future     Expiration Date:   6/8/2026       Era Cardenas PA-C      Portions of the record may have been created with voice recognition software. Occasional wrong word or \"sound alike\" substitutions may have occurred due to the inherent limitations of voice recognition software. Read the chart carefully and recognize, using context, where substitutions have occurred.    "

## 2025-04-08 NOTE — PROGRESS NOTES
Wound Procedure Treatment Lower;Right Leg    Performed by: Gail Guzman RN  Authorized by: Era Cardenas PA-C  Associated wounds:   Wound 04/08/25 Traumatic Leg Lower;Right    Wound cleansed with:  NSS   Applied topical:  Medihoney gel   Applied secondary dressing:  Gauze   Dressing secured with:  Tape and Jarad

## 2025-04-08 NOTE — PROGRESS NOTES
Wound Procedure Treatment    Performed by: Gail Guzman RN  Authorized by: Era Cardenas PA-C  Associated wounds:   Wound 04/08/25 Diabetic Ulcer Heel Right  Wound 04/08/25 Diabetic Ulcer Foot Lateral;Right    Wound cleansed with:  NSS   Applied topical:  Medihoney gel   Applied secondary dressing:  Gauze   Dressing secured with:  Jarad and Tape   Offloading device appllied:  Surgical shoe

## 2025-04-08 NOTE — PATIENT INSTRUCTIONS
Orders Placed This Encounter   Procedures    Wound cleansing and dressings Lower;Right Leg     Right lower leg     Wash your hands with soap and water.  Remove old dressing, discard into plastic bag and place in trash.  Cleanse the wound with unscented soap and water  prior to applying a clean dressing. Do not use tissue or cotton balls. Do not scrub the wound. Pat dry using gauze.  Shower yes -- with cast cover to keep the dressing dry     Apply Medihoney to the right leg wound.  Cover with gauze.  Secure with becki and tape   Change dressing daily     Standing Status:   Future     Expiration Date:   4/15/2025    Wound cleansing and dressings     Right lateral foot, right heel   Wash your hands with soap and water. Remove old dressing, discard into plastic bag and place in trash. Cleanse the wound with gentle soap and water (unscented Dove) prior to applying a clean dressing. Do not use tissue or cotton balls. Do not scrub the wound. Pat dry using gauze. Do not use harsh cleanser/hydrogen peroxide     Shower yes - with cast cover only to keep the dressing dry. Cast cover is available for purchase online     Apply Santyl to the lateral foot and heel wounds   Cover with gauze   Secure with becki and tape   Change dressing daily and as needed for drainage leakage or displacement   **Santyl will be ordered for you from a specialty pharmacy. They will call you for approval for the price. If it is to expensive please call wound management center for a different treatment.**    Surgical shoe to right foot   wear for all ambulation and transfers      Medihoney applied to the wound today (not santyl) continue to use the medihoney until the santyl is delivered to your home     Obtain X-Ray and blood work as ordered as soon as possible   Obtain Vascular study as ordered Increase protein in your diet with each meal. 3 to 4 servings per day - Meat, chicken, eggs, nut, greek yogurt, legumes, and lentils are good sources of  protein.   Keep weight and pressure off wounds     Follow up in  1   weeks at Wound Management center     Please call the Wound Management Center Monday through Friday between 8-430 for any questions. Monitor for any signs or symptoms of infection such as increase redness or pain, fever/chills, nausea/vomiting, malodor, or increased drainage.     If you have any signs or symptoms please call the wound center, if after hours or on holiday/weekend call your primary care provider or go to the emergency department to be evaluated     Standing Status:   Future     Expiration Date:   4/15/2025    XR foot 3+ vw right     Standing Status:   Future     Expected Date:   4/8/2025     Expiration Date:   4/8/2029     Scheduling Instructions:      Bring along any outside films relating to this procedure.          CBC and differential     This is a patient instruction: This test is non-fasting. Please drink two glasses of water morning of bloodwork.        Standing Status:   Future     Expiration Date:   6/8/2026    Basic metabolic panel     This is a patient instruction: Patient fasting for 8 hours or longer recommended.     Standing Status:   Future     Expiration Date:   6/8/2026    Sedimentation rate, automated     Standing Status:   Future     Expiration Date:   6/8/2026    C-reactive protein     Standing Status:   Future     Expiration Date:   6/8/2026

## 2025-04-09 ENCOUNTER — APPOINTMENT (OUTPATIENT)
Dept: RADIOLOGY | Facility: CLINIC | Age: 80
End: 2025-04-09
Payer: COMMERCIAL

## 2025-04-09 ENCOUNTER — APPOINTMENT (OUTPATIENT)
Age: 80
End: 2025-04-09
Payer: COMMERCIAL

## 2025-04-09 DIAGNOSIS — L97.412 DIABETIC ULCER OF RIGHT MIDFOOT ASSOCIATED WITH TYPE 2 DIABETES MELLITUS, WITH FAT LAYER EXPOSED (HCC): ICD-10-CM

## 2025-04-09 DIAGNOSIS — E11.621 DIABETIC ULCER OF RIGHT HEEL ASSOCIATED WITH TYPE 2 DIABETES MELLITUS, WITH FAT LAYER EXPOSED (HCC): ICD-10-CM

## 2025-04-09 DIAGNOSIS — Z79.01 LONG TERM (CURRENT) USE OF ANTICOAGULANTS: ICD-10-CM

## 2025-04-09 DIAGNOSIS — E11.621 DIABETIC ULCER OF RIGHT MIDFOOT ASSOCIATED WITH TYPE 2 DIABETES MELLITUS, WITH FAT LAYER EXPOSED (HCC): ICD-10-CM

## 2025-04-09 DIAGNOSIS — Z79.899 POLYPHARMACY: ICD-10-CM

## 2025-04-09 DIAGNOSIS — E11.9 TYPE 2 DIABETES MELLITUS WITHOUT COMPLICATION, UNSPECIFIED WHETHER LONG TERM INSULIN USE (HCC): ICD-10-CM

## 2025-04-09 DIAGNOSIS — R73.9 BLOOD GLUCOSE ELEVATED: ICD-10-CM

## 2025-04-09 DIAGNOSIS — L97.412 DIABETIC ULCER OF RIGHT HEEL ASSOCIATED WITH TYPE 2 DIABETES MELLITUS, WITH FAT LAYER EXPOSED (HCC): ICD-10-CM

## 2025-04-09 LAB
ANION GAP SERPL CALCULATED.3IONS-SCNC: 8 MMOL/L (ref 4–13)
BASOPHILS # BLD AUTO: 0.09 THOUSANDS/ÂΜL (ref 0–0.1)
BASOPHILS NFR BLD AUTO: 1 % (ref 0–1)
BUN SERPL-MCNC: 20 MG/DL (ref 5–25)
CALCIUM SERPL-MCNC: 9.4 MG/DL (ref 8.4–10.2)
CHLORIDE SERPL-SCNC: 102 MMOL/L (ref 96–108)
CO2 SERPL-SCNC: 27 MMOL/L (ref 21–32)
CREAT SERPL-MCNC: 1.03 MG/DL (ref 0.6–1.3)
CRP SERPL QL: 33.6 MG/L
EOSINOPHIL # BLD AUTO: 0.22 THOUSAND/ÂΜL (ref 0–0.61)
EOSINOPHIL NFR BLD AUTO: 2 % (ref 0–6)
ERYTHROCYTE [DISTWIDTH] IN BLOOD BY AUTOMATED COUNT: 13.3 % (ref 11.6–15.1)
ERYTHROCYTE [SEDIMENTATION RATE] IN BLOOD: 28 MM/HOUR (ref 0–19)
EST. AVERAGE GLUCOSE BLD GHB EST-MCNC: 220 MG/DL
GFR SERPL CREATININE-BSD FRML MDRD: 68 ML/MIN/1.73SQ M
GLUCOSE P FAST SERPL-MCNC: 129 MG/DL (ref 65–99)
HBA1C MFR BLD: 9.3 %
HCT VFR BLD AUTO: 37.8 % (ref 36.5–49.3)
HGB BLD-MCNC: 12.2 G/DL (ref 12–17)
IMM GRANULOCYTES # BLD AUTO: 0.12 THOUSAND/UL (ref 0–0.2)
IMM GRANULOCYTES NFR BLD AUTO: 1 % (ref 0–2)
LYMPHOCYTES # BLD AUTO: 3.02 THOUSANDS/ÂΜL (ref 0.6–4.47)
LYMPHOCYTES NFR BLD AUTO: 29 % (ref 14–44)
MCH RBC QN AUTO: 29.8 PG (ref 26.8–34.3)
MCHC RBC AUTO-ENTMCNC: 32.3 G/DL (ref 31.4–37.4)
MCV RBC AUTO: 92 FL (ref 82–98)
MONOCYTES # BLD AUTO: 0.88 THOUSAND/ÂΜL (ref 0.17–1.22)
MONOCYTES NFR BLD AUTO: 9 % (ref 4–12)
NEUTROPHILS # BLD AUTO: 6 THOUSANDS/ÂΜL (ref 1.85–7.62)
NEUTS SEG NFR BLD AUTO: 58 % (ref 43–75)
NRBC BLD AUTO-RTO: 0 /100 WBCS
PLATELET # BLD AUTO: 375 THOUSANDS/UL (ref 149–390)
PMV BLD AUTO: 9.9 FL (ref 8.9–12.7)
POTASSIUM SERPL-SCNC: 4.1 MMOL/L (ref 3.5–5.3)
RBC # BLD AUTO: 4.1 MILLION/UL (ref 3.88–5.62)
SODIUM SERPL-SCNC: 137 MMOL/L (ref 135–147)
WBC # BLD AUTO: 10.33 THOUSAND/UL (ref 4.31–10.16)

## 2025-04-09 PROCEDURE — 86140 C-REACTIVE PROTEIN: CPT

## 2025-04-09 PROCEDURE — 80048 BASIC METABOLIC PNL TOTAL CA: CPT

## 2025-04-09 PROCEDURE — 83036 HEMOGLOBIN GLYCOSYLATED A1C: CPT

## 2025-04-09 PROCEDURE — 73630 X-RAY EXAM OF FOOT: CPT

## 2025-04-09 PROCEDURE — 36415 COLL VENOUS BLD VENIPUNCTURE: CPT

## 2025-04-09 PROCEDURE — 85652 RBC SED RATE AUTOMATED: CPT

## 2025-04-09 PROCEDURE — 85025 COMPLETE CBC W/AUTO DIFF WBC: CPT

## 2025-04-10 DIAGNOSIS — L97.412 DIABETIC ULCER OF RIGHT MIDFOOT ASSOCIATED WITH TYPE 2 DIABETES MELLITUS, WITH FAT LAYER EXPOSED (HCC): Primary | ICD-10-CM

## 2025-04-10 DIAGNOSIS — E11.621 DIABETIC ULCER OF RIGHT MIDFOOT ASSOCIATED WITH TYPE 2 DIABETES MELLITUS, WITH FAT LAYER EXPOSED (HCC): Primary | ICD-10-CM

## 2025-04-10 RX ORDER — DOXYCYCLINE 100 MG/1
100 CAPSULE ORAL EVERY 12 HOURS SCHEDULED
Qty: 14 CAPSULE | Refills: 0 | Status: SHIPPED | OUTPATIENT
Start: 2025-04-10 | End: 2025-04-17

## 2025-04-10 NOTE — PROGRESS NOTES
Called and spoke with patient regarding results of labwork and XR. WBC is becoming elevated concerning for infection. Inflammatory markers are elevated but are non-specific for osteomyelitis. ESR is lower than what would be considered specific for bone infection. Will initiated on antibiotic given elevated WBC count. Pt does not feel sick; denies fevers, chills, malaise, night sweats, fatigue therefore feel as though oral antibiotics are reasonable along with outpatient MRI for further assessment to r/o underlying bone infection. Will place order for MRI now and notify patient of central scheduling number once it is confirmed no prior-authorization is needed for MRI.

## 2025-04-15 ENCOUNTER — OFFICE VISIT (OUTPATIENT)
Facility: HOSPITAL | Age: 80
End: 2025-04-15
Payer: COMMERCIAL

## 2025-04-15 VITALS
HEART RATE: 88 BPM | TEMPERATURE: 97.8 F | SYSTOLIC BLOOD PRESSURE: 138 MMHG | RESPIRATION RATE: 18 BRPM | DIASTOLIC BLOOD PRESSURE: 70 MMHG

## 2025-04-15 DIAGNOSIS — E11.621 DIABETIC ULCER OF RIGHT HEEL ASSOCIATED WITH TYPE 2 DIABETES MELLITUS, WITH FAT LAYER EXPOSED (HCC): ICD-10-CM

## 2025-04-15 DIAGNOSIS — S81.801D TRAUMATIC OPEN WOUND OF RIGHT LOWER LEG WITH DELAYED HEALING: ICD-10-CM

## 2025-04-15 DIAGNOSIS — L97.412 DIABETIC ULCER OF RIGHT MIDFOOT ASSOCIATED WITH TYPE 2 DIABETES MELLITUS, WITH FAT LAYER EXPOSED (HCC): Primary | ICD-10-CM

## 2025-04-15 DIAGNOSIS — L97.412 DIABETIC ULCER OF RIGHT HEEL ASSOCIATED WITH TYPE 2 DIABETES MELLITUS, WITH FAT LAYER EXPOSED (HCC): ICD-10-CM

## 2025-04-15 DIAGNOSIS — E11.621 DIABETIC ULCER OF RIGHT MIDFOOT ASSOCIATED WITH TYPE 2 DIABETES MELLITUS, WITH FAT LAYER EXPOSED (HCC): Primary | ICD-10-CM

## 2025-04-15 PROCEDURE — 99214 OFFICE O/P EST MOD 30 MIN: CPT | Performed by: PODIATRIST

## 2025-04-15 RX ORDER — LIDOCAINE 40 MG/G
CREAM TOPICAL ONCE
Status: COMPLETED | OUTPATIENT
Start: 2025-04-15 | End: 2025-04-15

## 2025-04-15 RX ADMIN — LIDOCAINE 1 APPLICATION: 40 CREAM TOPICAL at 10:03

## 2025-04-15 NOTE — PROGRESS NOTES
Patient ID: Gold Van is a 79 y.o. male Date of Birth 1945       Chief Complaint   Patient presents with    Follow Up Wound Care Visit     Right foot and heel       Allergies:  Ciprofloxacin, Sulfamethoxazole, Trimethoprim, Dexamethasone, Triamcinolone, and Bactrim [sulfamethoxazole-trimethoprim]    Diagnosis:  1. Diabetic ulcer of right midfoot associated with type 2 diabetes mellitus, with fat layer exposed (HCC)  -     lidocaine (LMX) 4 % cream  2. Diabetic ulcer of right heel associated with type 2 diabetes mellitus, with fat layer exposed (HCC)  -     lidocaine (LMX) 4 % cream  3. Traumatic open wound of right lower leg with delayed healing  -     Wound cleansing and dressings Lower;Right Leg; Future     Diagnosis ICD-10-CM Associated Orders   1. Diabetic ulcer of right midfoot associated with type 2 diabetes mellitus, with fat layer exposed (HCC)  E11.621 lidocaine (LMX) 4 % cream    L97.412       2. Diabetic ulcer of right heel associated with type 2 diabetes mellitus, with fat layer exposed (HCC)  E11.621 lidocaine (LMX) 4 % cream    L97.412       3. Traumatic open wound of right lower leg with delayed healing  S81.801D Wound cleansing and dressings Lower;Right Leg           Assessment & Plan:  See wound orders.   - Previous wound care note was reviewed and the workup is perfect  - There are increased inflammatory markers with CRP, ESR and the white blood cells slightly increased but not abnormally  - We will continue to monitor this area closely and await MRI, I think there is very high probability of vascular arterial involvement here and also underlying osteomyelitis we did discuss potential need for amputation in the future and we did discuss how he needs to work on getting his last A1c under control  - A1c is better than previous but still elevated    Subjective:   Presents for evaluation and management of his right leg and heel.  He did have his blood work done and also the x-ray of the MRI  and vascular studies are pending over the next couple of weeks.  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    Non-recurrent bilateral inguinal hernia without obstruction or gangrene    Pruritus    History of pneumothorax    Acute respiratory failure with hypoxia ([facility])    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[facility])    Shortness of breath    COVID-19    Acute respiratory insufficiency    Dysmetria    CVA (cerebrovascular accident) ([facility])    Abnormal echocardiogram    Gastroesophageal reflux disease without esophagitis    Impaired mobility and activities of daily living    Neuropathy    Foot drop, left    Moderate protein-calorie malnutrition ([facility])    Other specified peripheral vascular diseases ([facility])    Ulcer of right foot with fat layer exposed ([facility])     Past Medical History:   Diagnosis Date    Asthma     COVID-19     CVA (cerebral vascular accident) ([facility])     Diabetes mellitus ([facility])     Environmental allergies     Hyperlipidemia     Hypertension     Macular degeneration 07/13/2020    right eye    Orthostatic hypotension     Osteopenia     Pneumonia     Pneumothorax     Sinusitis      Past Surgical History:   Procedure Laterality Date    CARPAL TUNNEL RELEASE Left 08/2024    CATARACT EXTRACTION Left 07/2024    COLONOSCOPY      KNEE CARTILAGE SURGERY Right 1964    VASECTOMY      WISDOM TOOTH EXTRACTION      x4     Social History     Socioeconomic History    Marital status: /Civil Union     Spouse name: Not on file    Number of children: Not on file    Years of education: Not on file    Highest education level: Not on file   Occupational History    Not on file    Tobacco Use    Smoking status: Former    Smokeless tobacco: Never    Tobacco comments:     quit about 25 years ago   Vaping Use    Vaping status: Never Used   Substance and Sexual Activity    Alcohol use: Never    Drug use: Never    Sexual activity: Yes     Partners: Female   Other Topics Concern    Not on file   Social History Narrative    Daily caffeine use- 1 cup of tea, 2 cups of coffee     Social Drivers of Health     Financial Resource Strain: Not on file   Food Insecurity: No Food Insecurity (10/17/2024)    Nursing - Inadequate Food Risk Classification     Worried About Running Out of Food in the Last Year: Never true     Ran Out of Food in the Last Year: Never true     Ran Out of Food in the Last Year: Not on file   Transportation Needs: No Transportation Needs (10/17/2024)    PRAPARE - Transportation     Lack of Transportation (Medical): No     Lack of Transportation (Non-Medical): No   Physical Activity: Not on file   Stress: Not on file   Social Connections: Unknown (6/18/2024)    Received from [employer]     How often do you feel lonely or isolated from those around you? (Adult - for ages 18 years and over): Not on file   Intimate Partner Violence: Not on file   Housing Stability: Low Risk  (10/17/2024)    Housing Stability Vital Sign     Unable to Pay for Housing in the Last Year: No     Number of Times Moved in the Last Year: 0     Homeless in the Last Year: No        Current Outpatient Medications:     albuterol (ACCUNEB) 1.25 MG/3ML nebulizer solution, Take 1.25 mg by nebulization every 6 (six) hours as needed for wheezing, Disp: , Rfl:     albuterol (PROVENTIL HFA,VENTOLIN HFA) 90 mcg/act inhaler, Ventolin HFA 90 mcg/actuation aerosol inhaler, Disp: , Rfl:     amLODIPine (NORVASC) 5 mg tablet, Take 5 mg by mouth 2 (two) times a day, Disp: , Rfl:     aspirin 81 mg chewable tablet, Chew 1 tablet (81 mg total) daily, Disp: , Rfl:     atenolol (TENORMIN) 25 mg tablet, Take 25 mg  by mouth daily, Disp: , Rfl:     atorvastatin (LIPITOR) 40 mg tablet, Take 1 tablet (40 mg total) by mouth every evening, Disp: 30 tablet, Rfl: 0    Budeson-Glycopyrrol-Formoterol (Breztri Aerosphere) 160-9-4.8 MCG/ACT AERO, Take 2 puffs by mouth Every 12 hours, Disp: , Rfl:     collagenase (SANTYL) ointment, Apply topically daily To wounds of R lateral foot and heel, Disp: 30 g, Rfl: 1    doxycycline hyclate (VIBRAMYCIN) 100 mg capsule, Take 1 capsule (100 mg total) by mouth every 12 (twelve) hours for 7 days, Disp: 14 capsule, Rfl: 0    Eliquis 5 MG, , Disp: , Rfl:     famotidine (PEPCID) 20 mg tablet, Take 1 tablet (20 mg total) by mouth 2 (two) times a day, Disp: , Rfl:     glipiZIDE (GLUCOTROL XL) 2.5 mg 24 hr tablet, Take 2.5 mg by mouth 2 (two) times a day, Disp: , Rfl:     guaiFENesin (MUCINEX) 600 mg 12 hr tablet, Take 1 tablet (600 mg total) by mouth 2 (two) times a day, Disp: , Rfl:     lisinopril (ZESTRIL) 20 mg tablet, Take 20 mg by mouth 2 (two) times a day, Disp: , Rfl:     LORazepam (ATIVAN) 0.5 mg tablet, Take 0.5 mg by mouth if needed, Disp: , Rfl:     metFORMIN (GLUCOPHAGE-XR) 750 mg 24 hr tablet, Take 1 tablet (750 mg total) by mouth daily with dinner, Disp: , Rfl:     montelukast (SINGULAIR) 10 mg tablet, Take 1 tablet (10 mg total) by mouth daily at bedtime, Disp: , Rfl:     Multiple Vitamins-Minerals (Multivitamin Adults) TABS, Take 1 tablet by mouth daily, Disp: , Rfl:     OneTouch Ultra test strip, , Disp: , Rfl:     predniSONE 5 mg tablet, Take 1 tablet (5 mg total) by mouth every other day, Disp: , Rfl:     predniSONE 5 mg tablet, Take 3 tablets (15 mg total) by mouth every other day (Patient not taking: Reported on 4/2/2025), Disp: , Rfl:   No current facility-administered medications for this visit.  Family History   Problem Relation Age of Onset    Asthma Mother     Emphysema Mother     Cancer Father     Asthma Brother     Cancer Brother       Review of Systems   Constitutional:   Negative for chills and fever.   HENT:  Negative for ear pain and sore throat.    Eyes:  Negative for pain and visual disturbance.   Respiratory:  Negative for cough and shortness of breath.    Cardiovascular:  Negative for chest pain and palpitations.   Gastrointestinal:  Negative for abdominal pain and vomiting.   Genitourinary:  Negative for dysuria and hematuria.   Musculoskeletal:  Negative for arthralgias and back pain.   Skin:  Negative for color change and rash.   Neurological:  Negative for seizures and syncope.   All other systems reviewed and are negative.        Objective:  /70   Pulse 88   Temp 97.8 °F (36.6 °C)   Resp 18     Physical Exam  Musculoskeletal:      Comments: There is spongy fibronecrotic tissue on the right first MPJ wound with mild malodor.  Peau to periosteum.  Some slight soft tissue swelling with callus to the periwound.  There is also a somewhat punched-out ulceration of the posterior aspect of the right heel with fat layer exposed with mild drainage             Wound 10/08/24 Pretibial Distal;Right (Active)       Wound 10/08/24 Skin Tear Skin tear Arm Anterior;Right;Lower (Active)       Wound 04/08/25 Diabetic Ulcer Foot Lateral;Right (Active)   Wound Image   04/15/25 0947   Enter Mills score: Mills Grade 1: Partial or full-thickness ulcer (superficial) 04/08/25 1041   Wound Description Slough;Eschar;Pink 04/15/25 0955   Non-staged Wound Description Full thickness 04/15/25 0955   Wound Length (cm) 1.4 cm 04/15/25 0955   Wound Width (cm) 1.5 cm 04/15/25 0955   Wound Depth (cm) 0.1 cm 04/15/25 0955   Wound Surface Area (cm^2) 2.1 cm^2 04/15/25 0955   Wound Volume (cm^3) 0.21 cm^3 04/15/25 0955   Calculated Wound Volume (cm^3) 0.21 cm^3 04/15/25 0955   Change in Wound Size % -61.54 04/15/25 0955   Number of underminings 1 04/08/25 1041   Undermining 1 0.4 04/15/25 0955   Undermining 1 is depth extending from 12-4 04/15/25 0955   Drainage Amount Small 04/15/25 0955   Drainage  Description Walker;Serosanguineous 04/15/25 0955   Miranda-wound Assessment Maceration;Pink 04/15/25 0955   Treatments Irrigation with NSS 04/08/25 1041   Dressing Status Intact;Old drainage 04/15/25 0955       Wound 04/08/25 Traumatic Leg Lower;Right (Active)   Wound Image   04/15/25 0952   Wound Description Epithelialization;Dry 04/15/25 1000   Non-staged Wound Description Not applicable 04/15/25 1000   Wound Length (cm) 0.1 cm 04/15/25 1000   Wound Width (cm) 0.1 cm 04/15/25 1000   Wound Depth (cm) 0.1 cm 04/15/25 1000   Wound Surface Area (cm^2) 0.01 cm^2 04/15/25 1000   Wound Volume (cm^3) 0.001 cm^3 04/15/25 1000   Calculated Wound Volume (cm^3) 0 cm^3 04/15/25 1000   Change in Wound Size % 100 04/15/25 1000   Drainage Amount None 04/15/25 1000   Drainage Description Serosanguineous 04/08/25 1044   Miranda-wound Assessment Scar Tissue 04/15/25 1000   Treatments Irrigation with NSS 04/08/25 1044   Patient Tolerance Tolerated well 04/08/25 1044   Dressing Status Intact 04/15/25 1000       Wound 04/08/25 Diabetic Ulcer Heel Right (Active)   Wound Image   04/15/25 0950   Enter Mills score: Mills Grade 1: Partial or full-thickness ulcer (superficial) 04/08/25 1045   Wound Description White;Pink;Slough 04/15/25 0959   Non-staged Wound Description Full thickness 04/15/25 0959   Wound Length (cm) 0.6 cm 04/15/25 0959   Wound Width (cm) 0.4 cm 04/15/25 0959   Wound Depth (cm) 0.2 cm 04/15/25 0959   Wound Surface Area (cm^2) 0.24 cm^2 04/15/25 0959   Wound Volume (cm^3) 0.048 cm^3 04/15/25 0959   Calculated Wound Volume (cm^3) 0.05 cm^3 04/15/25 0959   Change in Wound Size % -150 04/15/25 0959   Drainage Amount Scant 04/15/25 0959   Drainage Description Serosanguineous 04/15/25 0959   Miranda-wound Assessment Pink;Maceration;Callus 04/15/25 0959   Treatments Irrigation with NSS 04/08/25 1045   Dressing Status Old drainage;Intact 04/15/25 0959                         Procedures             Wound Instructions:  Orders Placed This  "Encounter   Procedures    Wound cleansing and dressings Lower;Right Leg     Right lower leg wound is healed    If you feel more comfortable you can cover with a bandage for extra protection.  Moisturize to prevent dry skin.     Standing Status:   Future     Expiration Date:   4/22/2025         Agustin Galeas DPM      Portions of the record may have been created with voice recognition software. Occasional wrong word or \"sound a like\" substitutions may have occurred due to the inherent limitations of voice recognition software. Read the chart carefully and recognize, using context, where substitutions have occurred.      "

## 2025-04-15 NOTE — PATIENT INSTRUCTIONS
Orders Placed This Encounter   Procedures    Wound cleansing and dressings     Wound cleansing and dressings    Right lateral foot and right heel     Wash your hands with soap and water. Remove old dressing, discard into plastic bag and place in trash. Cleanse the wound with gentle soap and water (unscented Dove) prior to applying a clean dressing. Do not use tissue or cotton balls. Do not scrub the wound. Pat dry using gauze. Do not use harsh cleanser/hydrogen peroxide      Shower yes - with cast cover only to keep the dressing dry. Cast cover is available for purchase online      Apply Santyl to the lateral foot and heel wounds   Cover with gauze  Secure with becki and tape   Change dressing daily and as needed for drainage leakage or displacement      Surgical shoe to right foot   wear for all ambulation and transfers      Increase protein in your diet with each meal. 3 to 4 servings per day - Meat, chicken, eggs, nut, greek yogurt, legumes, and lentils are good sources of protein.   Keep weight and pressure off wounds      Follow up in 1 week at Wound Management center      Please call the Wound Management Center Monday through Friday between 8-430 for any questions. Monitor for any signs or symptoms of infection such as increase redness or pain, fever/chills, nausea/vomiting, malodor, or increased drainage.      If you have any signs or symptoms please call the wound center, if after hours or on holiday/weekend call your primary care provider or go to the emergency department to be evaluated     Standing Status:   Future     Expiration Date:   4/22/2025    Wound cleansing and dressings Lower;Right Leg     Right lower leg wound is healed    If you feel more comfortable you can cover with a bandage for extra protection.  Moisturize to prevent dry skin.     Standing Status:   Future     Expiration Date:   4/22/2025

## 2025-04-15 NOTE — PROGRESS NOTES
Wound Procedure Treatment    Performed by: Julita Martinez LPN  Authorized by: Agustin Galeas DPM  Associated wounds:   Wound 04/08/25 Diabetic Ulcer Heel Right  Wound 04/08/25 Diabetic Ulcer Foot Lateral;Right    Wound cleansed with:  NSS   Applied topical:  Woun'Dres   Applied secondary dressing:  Gauze   Dressing secured with:  Jarad and Tape

## 2025-04-17 ENCOUNTER — HOSPITAL ENCOUNTER (OUTPATIENT)
Dept: MRI IMAGING | Facility: HOSPITAL | Age: 80
End: 2025-04-17
Payer: COMMERCIAL

## 2025-04-17 DIAGNOSIS — E11.621 DIABETIC ULCER OF RIGHT MIDFOOT ASSOCIATED WITH TYPE 2 DIABETES MELLITUS, WITH FAT LAYER EXPOSED (HCC): ICD-10-CM

## 2025-04-17 DIAGNOSIS — L97.412 DIABETIC ULCER OF RIGHT MIDFOOT ASSOCIATED WITH TYPE 2 DIABETES MELLITUS, WITH FAT LAYER EXPOSED (HCC): ICD-10-CM

## 2025-04-17 PROCEDURE — 73718 MRI LOWER EXTREMITY W/O DYE: CPT

## 2025-04-18 ENCOUNTER — HOSPITAL ENCOUNTER (OUTPATIENT)
Dept: NON INVASIVE DIAGNOSTICS | Facility: HOSPITAL | Age: 80
Discharge: HOME | End: 2025-04-18
Attending: STUDENT IN AN ORGANIZED HEALTH CARE EDUCATION/TRAINING PROGRAM
Payer: COMMERCIAL

## 2025-04-18 DIAGNOSIS — E11.621 DIABETIC ULCER OF RIGHT MIDFOOT ASSOCIATED WITH TYPE 2 DIABETES MELLITUS, WITH FAT LAYER EXPOSED (HCC): ICD-10-CM

## 2025-04-18 DIAGNOSIS — R68.89 ABNORMAL ANKLE BRACHIAL INDEX (ABI): ICD-10-CM

## 2025-04-18 DIAGNOSIS — E11.621 DIABETIC ULCER OF RIGHT HEEL ASSOCIATED WITH TYPE 2 DIABETES MELLITUS, WITH FAT LAYER EXPOSED (HCC): ICD-10-CM

## 2025-04-18 DIAGNOSIS — L97.412 DIABETIC ULCER OF RIGHT HEEL ASSOCIATED WITH TYPE 2 DIABETES MELLITUS, WITH FAT LAYER EXPOSED (HCC): ICD-10-CM

## 2025-04-18 DIAGNOSIS — L97.412 DIABETIC ULCER OF RIGHT MIDFOOT ASSOCIATED WITH TYPE 2 DIABETES MELLITUS, WITH FAT LAYER EXPOSED (HCC): ICD-10-CM

## 2025-04-18 PROCEDURE — 93925 LOWER EXTREMITY STUDY: CPT

## 2025-04-18 PROCEDURE — 93922 UPR/L XTREMITY ART 2 LEVELS: CPT | Performed by: SURGERY

## 2025-04-18 PROCEDURE — 93925 LOWER EXTREMITY STUDY: CPT | Performed by: SURGERY

## 2025-04-18 PROCEDURE — 93923 UPR/LXTR ART STDY 3+ LVLS: CPT

## 2025-04-21 ENCOUNTER — RESULTS FOLLOW-UP (OUTPATIENT)
Facility: HOSPITAL | Age: 80
End: 2025-04-21

## 2025-04-21 DIAGNOSIS — R68.89 ABNORMAL ANKLE BRACHIAL INDEX (ABI): ICD-10-CM

## 2025-04-21 DIAGNOSIS — L97.412 DIABETIC ULCER OF RIGHT MIDFOOT ASSOCIATED WITH TYPE 2 DIABETES MELLITUS, WITH FAT LAYER EXPOSED (HCC): Primary | ICD-10-CM

## 2025-04-21 DIAGNOSIS — E11.621 DIABETIC ULCER OF RIGHT HEEL ASSOCIATED WITH TYPE 2 DIABETES MELLITUS, WITH FAT LAYER EXPOSED (HCC): ICD-10-CM

## 2025-04-21 DIAGNOSIS — I73.9 SEVERE PERIPHERAL ARTERIAL DISEASE (HCC): ICD-10-CM

## 2025-04-21 DIAGNOSIS — E11.621 DIABETIC ULCER OF RIGHT MIDFOOT ASSOCIATED WITH TYPE 2 DIABETES MELLITUS, WITH FAT LAYER EXPOSED (HCC): Primary | ICD-10-CM

## 2025-04-21 DIAGNOSIS — L97.412 DIABETIC ULCER OF RIGHT HEEL ASSOCIATED WITH TYPE 2 DIABETES MELLITUS, WITH FAT LAYER EXPOSED (HCC): ICD-10-CM

## 2025-04-21 NOTE — TELEPHONE ENCOUNTER
Called pt and left voicemail. Requesting call back to review vascular testing and MRI results. Vascular referral placed now.

## 2025-04-22 ENCOUNTER — OFFICE VISIT (OUTPATIENT)
Facility: HOSPITAL | Age: 80
End: 2025-04-22
Payer: COMMERCIAL

## 2025-04-22 VITALS
SYSTOLIC BLOOD PRESSURE: 143 MMHG | TEMPERATURE: 97.3 F | DIASTOLIC BLOOD PRESSURE: 83 MMHG | RESPIRATION RATE: 18 BRPM | HEART RATE: 92 BPM

## 2025-04-22 DIAGNOSIS — M86.471 CHRONIC OSTEOMYELITIS OF RIGHT FOOT WITH DRAINING SINUS (HCC): ICD-10-CM

## 2025-04-22 DIAGNOSIS — E11.621 DIABETIC ULCER OF RIGHT HEEL ASSOCIATED WITH TYPE 2 DIABETES MELLITUS, WITH FAT LAYER EXPOSED (HCC): ICD-10-CM

## 2025-04-22 DIAGNOSIS — I77.1 STENOSIS OF ARTERY (HCC): Primary | ICD-10-CM

## 2025-04-22 DIAGNOSIS — I73.9 SEVERE PERIPHERAL ARTERIAL DISEASE (HCC): ICD-10-CM

## 2025-04-22 DIAGNOSIS — L97.412 DIABETIC ULCER OF RIGHT HEEL ASSOCIATED WITH TYPE 2 DIABETES MELLITUS, WITH FAT LAYER EXPOSED (HCC): ICD-10-CM

## 2025-04-22 PROCEDURE — 99214 OFFICE O/P EST MOD 30 MIN: CPT | Performed by: PODIATRIST

## 2025-04-22 PROCEDURE — 99213 OFFICE O/P EST LOW 20 MIN: CPT | Performed by: PODIATRIST

## 2025-04-22 NOTE — PROGRESS NOTES
Patient ID: Gold Van is a 79 y.o. male Date of Birth 1945       No chief complaint on file.      Allergies:  Ciprofloxacin, Sulfamethoxazole, Trimethoprim, Dexamethasone, Triamcinolone, and Bactrim [sulfamethoxazole-trimethoprim]    Diagnosis:  1. Stenosis of artery (HCC)  2. Diabetic ulcer of right heel associated with type 2 diabetes mellitus, with fat layer exposed (HCC)  3. Severe peripheral arterial disease (HCC)  4. 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 Stenosis of artery (HCC)  I77.1       2. Diabetic ulcer of right heel associated with type 2 diabetes mellitus, with fat layer exposed (HCC)  E11.621     L97.412       3. Severe peripheral arterial disease (HCC)  I73.9       4. 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   - No debridement today, we will transition him to Betadine, DSD  - He may use Band-Aids if this is more appropriate for him  Ambulate in surgical shoe  -MRI reviewed and shows T2 hyperintensity fifth metatarsal head likely consistent with underlying early osteomyelitis  Arterial studies reviewed and shows severe peripheral arterial disease falsely elevated RIC on the left and decrease healing potential to the bilateral hallux as below healing potential  -I placed a stat referral to vascular due to the presence of likely underlying early osteomyelitis of the right fifth metatarsal head and also severe peripheral vascular disease  - I do not think he is will be able to heal any form of these ulcerations if he does not have vascular intervention  - I discussed the risk to limb and life if he does not have close follow-up for this  - We will be reappointed in him here in 4 weeks for repeat assessment    Subjective:   Patient Menser evaluation management of his right foot, I did get his MRI and also arterial studies done.  He did review  Sports Medicine New Consult Note    Referring Provider: Dr Mahesh Scott    Chief Complaint   Patient presents with   • Musculoskeletal Problem     Left UE/right knee       HPI: Nisha is a 55 year old female who presents with complaints of left UE and right knee pain  - notes left bicep pain since October; started around the time she tested positive for COVID  - no injury or accident at that time  - reports pain initially felt like a severe pull/tear of the muscle belly any time she moved her arm  - significant difficulty with ADLs, such at putting on a coat  - has been slowly resolving, but is still present and is described as dull   - has not been doing any weightlifting since onset; is doing yoga but has to modify due to discomfort with things like plank position  - denies pain at rest  - no numbness/tingling  - denies radiation past elbow or into the shoulder joint   - RHD; feels  may be a little weaker on the left   - also reports right knee pain since tripping over a dog, landing directly on right knee   - pain currently rated at 2-3/10 and 6-7/10 at worst  - occasional clicking/popping noted  - no numbness/tingling  - significant difficulty with stair negotiation but not experiencing any catching/locking  - denies episodes of buckling/giving way    - does note occasional radiation into the lateral aspect of the calf  - was training for a half marathon; has not ran since fall and now is just walking  - no imaging  - typically uses tylenol for pain; avoids NSAIDs due to blood pressure meds     Review of Systems   Constitutional: Negative for chills and fever.   HENT: Negative for trouble swallowing.    Eyes: Negative for discharge and redness.   Respiratory: Negative for chest tightness, shortness of breath and stridor.    Cardiovascular: Negative for chest pain.   Gastrointestinal: Negative for abdominal pain, diarrhea, nausea and vomiting.   Skin: Negative for color change and rash.   Neurological: Negative  for dizziness, facial asymmetry and speech difficulty.   Psychiatric/Behavioral: Negative for agitation, behavioral problems, confusion and dysphoric mood.       Past Medical History:   Diagnosis Date   • Essential hypertension 06/12/2018    - controlled on losartan   • Fatty liver 05/04/2022   • History of endometriosis 05/04/2022   • Hypothyroidism 03/01/2019   • Incomplete right bundle branch block 12/30/2021   • Uterine polyp        Past Surgical History:   Procedure Laterality Date   • Adenoidectomy     • Dilation and curettage     • Hysteroscopy     • Tonsillectomy         Family History   Problem Relation Age of Onset   • Diabetes Mother    • Hypertension Mother    • Kidney disease Mother    • Heart disease Mother    • Hypertension Father    • Hyperthyroid Father    • Dementia/Alzheimers Father    • Cancer, Breast Sister    • Patient is unaware of any medical problems Sister    • Cancer, Breast Maternal Grandmother    • Cancer, Lung Paternal Grandfather    • Cancer, Breast Maternal Great-Grandmother    ,   No immediate family members with RA, SLE, gout    ALLERGIES:  No Known Allergies    Current Outpatient Medications   Medication Sig Dispense Refill   • levothyroxine (Synthroid) 75 MCG tablet Take 1 tablet by mouth daily. 90 tablet 3   • zoster vaccine recomb adjuvanted (Shingrix) 50 MCG/0.5ML injection Inject 0.5 mLs into the muscle 1 time. Repeat dose in 2 to 6 months (unless 1 dose already given), for a total of 2 doses. 1 each 1   • losartan (COZAAR) 100 MG tablet Take 1 tablet by mouth daily. 90 tablet 3   • hydroCHLOROthiazide (HYDRODIURIL) 25 MG tablet Take 1 tablet by mouth daily. 90 tablet 3   • Ascorbic Acid (VITAMIN C GUMMIES PO)      • Coenzyme Q10 (COQ-10 PO)      • PHOSPHATIDYLSERINE PO      • aspirin 81 MG chewable tablet Chew 81 mg by mouth daily. Pt started in Feb, 2020     • Cyclobenzaprine HCl (FLEXERIL PO)        No current facility-administered medications for this visit.       Social  results and consider further treatment.  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)    Shortness of breath    COVID-19    Acute respiratory insufficiency    Dysmetria    CVA (cerebrovascular accident) ([facility])    Abnormal echocardiogram    Gastroesophageal reflux disease without esophagitis    Impaired mobility and activities of daily living    Neuropathy    Foot drop, left    Moderate protein-calorie malnutrition ([facility])    Other specified peripheral vascular diseases ([facility])    Ulcer of right foot with fat layer exposed ([facility])     Past Medical History:   Diagnosis Date    Asthma     COVID-19     CVA (cerebral vascular accident) ([facility])     Diabetes mellitus ([facility])     Environmental allergies     Hyperlipidemia     Hypertension     Macular degeneration 07/13/2020    right eye    Orthostatic hypotension     Osteopenia     Pneumonia     Pneumothorax     Sinusitis      Past Surgical History:   Procedure Laterality Date    CARPAL TUNNEL RELEASE Left 08/2024    CATARACT EXTRACTION Left 07/2024    COLONOSCOPY      KNEE CARTILAGE SURGERY Right 1964    VASECTOMY      WISDOM TOOTH EXTRACTION      x4     Social History     Socioeconomic History    Marital status: /Civil Union     Spouse name: Not on file    Number of children: Not on file    Years of education: Not on file    Highest education level: Not on file   Occupational History    Not on file   Tobacco Use     History     Socioeconomic History   • Marital status: Single     Spouse name: Not on file   • Number of children: Not on file   • Years of education: Not on file   • Highest education level: Not on file   Occupational History   • 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[employer])   Tobacco Use   • Smoking status: Never Smoker   • Smokeless tobacco: Never Used   Vaping Use   • Vaping Use: never used   Substance and Sexual Activity   • Alcohol use: Yes     Alcohol/week: 0.0 - 1.0 standard drinks   • Drug use: Never   • Sexual activity: Not Currently     Partners: Male   Other Topics Concern   • Not on file   Social History Narrative   • Not on file     Social Determinants of Health     Financial Resource Strain: Not on file   Food Insecurity: Not on file   Transportation Needs: Not on file   Physical Activity: Not on file   Stress: Not on file   Social Connections: Not on file   Intimate Partner Violence: Not At Risk   • Social Determinants: Intimate Partner Violence Past Fear: No   • Social Determinants: Intimate Partner Violence Current Fear: No       Visit Vitals  BP (!) 146/79 (BP Location: RUE - Right upper extremity, Patient Position: Sitting)   Pulse (!) 55   LMP 04/04/2022 Comment: iud       Physical Exam  Vitals reviewed.   Constitutional:       Appearance: She is well-developed.   HENT:      Head: Normocephalic and atraumatic.   Pulmonary:      Effort: Pulmonary effort is normal. No respiratory distress.   Musculoskeletal:      Cervical back: Neck supple.   Skin:     General: Skin is warm and dry.      Findings: No rash.   Neurological:      Mental Status: She is alert.   Psychiatric:         Behavior: Behavior normal.       Ortho Exam  Normal shoulder range of motion with pain at extremes of flexion and mid-arc  abduction. Neer's positive. Hawkin's negative. Pain with resisted external rotation. No pain with resisted internal rotation or Empty can test. Golva's negative. Speed's positive. Tight and tender medial aspect of  Smoking status: Former    Smokeless tobacco: Never    Tobacco comments:     quit about 25 years ago   Vaping Use    Vaping status: Never Used   Substance and Sexual Activity    Alcohol use: Never    Drug use: Never    Sexual activity: Yes     Partners: Female   Other Topics Concern    Not on file   Social History Narrative    Daily caffeine use- 1 cup of tea, 2 cups of coffee     Social Drivers of Health     Financial Resource Strain: Not on file   Food Insecurity: No Food Insecurity (10/17/2024)    Nursing - Inadequate Food Risk Classification     Worried About Running Out of Food in the Last Year: Never true     Ran Out of Food in the Last Year: Never true     Ran Out of Food in the Last Year: Not on file   Transportation Needs: No Transportation Needs (10/17/2024)    PRAPARE - Transportation     Lack of Transportation (Medical): No     Lack of Transportation (Non-Medical): No   Physical Activity: Not on file   Stress: Not on file   Social Connections: Unknown (6/18/2024)    Received from [facility]     How often do you feel lonely or isolated from those around you? (Adult - for ages 18 years and over): Not on file   Intimate Partner Violence: Not on file   Housing Stability: Low Risk  (10/17/2024)    Housing Stability Vital Sign     Unable to Pay for Housing in the Last Year: No     Number of Times Moved in the Last Year: 0     Homeless in the Last Year: No        Current Outpatient Medications:     albuterol (ACCUNEB) 1.25 MG/3ML nebulizer solution, Take 1.25 mg by nebulization every 6 (six) hours as needed for wheezing, Disp: , Rfl:     albuterol (PROVENTIL HFA,VENTOLIN HFA) 90 mcg/act inhaler, Ventolin HFA 90 mcg/actuation aerosol inhaler, Disp: , Rfl:     amLODIPine (NORVASC) 5 mg tablet, Take 5 mg by mouth 2 (two) times a day, Disp: , Rfl:     aspirin 81 mg chewable tablet, Chew 1 tablet (81 mg total) daily, Disp: , Rfl:     atenolol (TENORMIN) 25 mg tablet, Take 25 mg by mouth daily,  Disp: , Rfl:     atorvastatin (LIPITOR) 40 mg tablet, Take 1 tablet (40 mg total) by mouth every evening, Disp: 30 tablet, Rfl: 0    Budeson-Glycopyrrol-Formoterol (Breztri Aerosphere) 160-9-4.8 MCG/ACT AERO, Take 2 puffs by mouth Every 12 hours, Disp: , Rfl:     collagenase (SANTYL) ointment, Apply topically daily To wounds of R lateral foot and heel, Disp: 30 g, Rfl: 1    Eliquis 5 MG, , Disp: , Rfl:     famotidine (PEPCID) 20 mg tablet, Take 1 tablet (20 mg total) by mouth 2 (two) times a day, Disp: , Rfl:     glipiZIDE (GLUCOTROL XL) 2.5 mg 24 hr tablet, Take 2.5 mg by mouth 2 (two) times a day, Disp: , Rfl:     guaiFENesin (MUCINEX) 600 mg 12 hr tablet, Take 1 tablet (600 mg total) by mouth 2 (two) times a day, Disp: , Rfl:     lisinopril (ZESTRIL) 20 mg tablet, Take 20 mg by mouth 2 (two) times a day, Disp: , Rfl:     LORazepam (ATIVAN) 0.5 mg tablet, Take 0.5 mg by mouth if needed, Disp: , Rfl:     metFORMIN (GLUCOPHAGE-XR) 750 mg 24 hr tablet, Take 1 tablet (750 mg total) by mouth daily with dinner, Disp: , Rfl:     montelukast (SINGULAIR) 10 mg tablet, Take 1 tablet (10 mg total) by mouth daily at bedtime, Disp: , Rfl:     Multiple Vitamins-Minerals (Multivitamin Adults) TABS, Take 1 tablet by mouth daily, Disp: , Rfl:     OneTouch Ultra test strip, , Disp: , Rfl:     predniSONE 5 mg tablet, Take 1 tablet (5 mg total) by mouth every other day, Disp: , Rfl:     predniSONE 5 mg tablet, Take 3 tablets (15 mg total) by mouth every other day (Patient not taking: Reported on 4/2/2025), Disp: , Rfl:   Family History   Problem Relation Age of Onset    Asthma Mother     Emphysema Mother     Cancer Father     Asthma Brother     Cancer Brother       Review of Systems   Constitutional:  Negative for chills and fever.   HENT:  Negative for ear pain and sore throat.    Eyes:  Negative for pain and visual disturbance.   Respiratory:  Negative for cough and shortness of breath.    Cardiovascular:  Negative for chest pain  the left upper arm diffusely. No pain with resisted elbow flexion or extension.    Full range of motion of the knee without pain.  No knee effusion present. Prominence and swelling of the right hoffa's fat pad laterally. Mild tenderness to palpation of the joint lines.  No tenderness to palpation of the patellar facets, patellar tendon or iliotibial band. Carrillo's negative.  Valgus, varus, Lachman's, anterior drawer and posterior drawer negative. Tender along the proximal peroneal muscle. No obvious bruising.    No imaging.    Assessment & Plan:   Subacromial impingement of left shoulder  (primary encounter diagnosis)  Plan: XR SHOULDER 3 VIEWS LEFT, SERVICE TO PHYSICAL         THERAPY    Rotator cuff tendonitis, left  Plan: XR SHOULDER 3 VIEWS LEFT, SERVICE TO PHYSICAL         THERAPY    Contusion of right knee, initial encounter  Plan: XR KNEE 4 VIEWS RIGHT AND AP STANDING BILATERAL    Hoffa's fat pad disease (CMS/HCC)  Plan: XR KNEE 4 VIEWS RIGHT AND AP STANDING         BILATERAL, SERVICE TO PHYSICAL THERAPY    Koehler taping applied with some improvement in stability after.     Will contact with xray results.     Prescribed naproxen 500mg bid for 2 wks.   The risks and benefits of the medication discussed with the patient, including potential side effects.    Provided a handout regarding the diagnosis that included information regarding the cause, natural history and treatment. The handout also included home exercises.    Follow up in 6 wks.     Mable Carrasco DO  Sports Medicine        Tomeka Zhou, LAT    This note was created using the Dragon voice recognition system. Errors in content may be related to improper recognition of the system. Effort to review and correct the note has been made but irregularities may still be present.         and palpitations.   Gastrointestinal:  Negative for abdominal pain and vomiting.   Genitourinary:  Negative for dysuria and hematuria.   Musculoskeletal:  Negative for arthralgias and back pain.   Skin:  Negative for color change and rash.   Neurological:  Negative for seizures and syncope.   All other systems reviewed and are negative.        Objective:  /83   Pulse 92   Temp (!) 97.3 °F (36.3 °C)   Resp 18     Physical Exam  Musculoskeletal:      Comments: No overall improvement in ulcerations, no further worsening of cellulitis, ulcers remain somewhat punched-out             Wound 04/08/25 Diabetic Ulcer Foot Lateral;Right (Active)   Wound Image   04/15/25 0947   Enter Mills score: Mills Grade 1: Partial or full-thickness ulcer (superficial) 04/08/25 1041   Wound Description Slough;Eschar;Pink 04/15/25 0955   Non-staged Wound Description Full thickness 04/15/25 0955   Wound Length (cm) 1.4 cm 04/15/25 0955   Wound Width (cm) 1.5 cm 04/15/25 0955   Wound Depth (cm) 0.1 cm 04/15/25 0955   Wound Surface Area (cm^2) 2.1 cm^2 04/15/25 0955   Wound Volume (cm^3) 0.21 cm^3 04/15/25 0955   Calculated Wound Volume (cm^3) 0.21 cm^3 04/15/25 0955   Change in Wound Size % -61.54 04/15/25 0955   Number of underminings 1 04/08/25 1041   Undermining 1 0.4 04/15/25 0955   Undermining 1 is depth extending from 12-4 04/15/25 0955   Drainage Amount Small 04/15/25 0955   Drainage Description Tan;Serosanguineous 04/15/25 0955   Miranda-wound Assessment Maceration;Pink 04/15/25 0955   Treatments Irrigation with NSS 04/08/25 1041   Dressing Status Intact;Old drainage 04/15/25 0955       Wound 04/08/25 Diabetic Ulcer Heel Right (Active)   Wound Image   04/15/25 0950   Enter Mills score: Mills Grade 1: Partial or full-thickness ulcer (superficial) 04/08/25 1045   Wound Description White;Pink;Slough 04/15/25 0959   Non-staged Wound Description Full thickness 04/15/25 0959   Wound Length (cm) 0.6 cm 04/15/25 0959   Wound Width  "(cm) 0.4 cm 04/15/25 0959   Wound Depth (cm) 0.2 cm 04/15/25 0959   Wound Surface Area (cm^2) 0.24 cm^2 04/15/25 0959   Wound Volume (cm^3) 0.048 cm^3 04/15/25 0959   Calculated Wound Volume (cm^3) 0.05 cm^3 04/15/25 0959   Change in Wound Size % -150 04/15/25 0959   Drainage Amount Scant 04/15/25 0959   Drainage Description Serosanguineous 04/15/25 0959   Miranda-wound Assessment Pink;Maceration;Callus 04/15/25 0959   Treatments Irrigation with NSS 04/08/25 1045   Dressing Status Old drainage;Intact 04/15/25 0959                         Procedures             Wound Instructions:  No orders of the defined types were placed in this encounter.        Agustin Galeas DPM      Portions of the record may have been created with voice recognition software. Occasional wrong word or \"sound a like\" substitutions may have occurred due to the inherent limitations of voice recognition software. Read the chart carefully and recognize, using context, where substitutions have occurred.      "

## 2025-04-22 NOTE — PATIENT INSTRUCTIONS
Orders Placed This Encounter   Procedures    Wound cleansing and dressings     STAT referral placed today with vascular surgeon Dr. Khadijah Pandey.   **If you do not hear from vascular by Friday. Call them    Right lateral foot and right heel      Wash your hands with soap and water. Remove old dressing, discard into plastic bag and place in trash. Cleanse the wound with gentle soap and water (unscented Dove) prior to applying a clean dressing. Do not use tissue or cotton balls. Do not scrub the wound. Pat dry using gauze. Do not use harsh cleanser/hydrogen peroxide      Shower yes - with cast cover only to keep the dressing dry. Cast cover is available for purchase online      Apply Betadine to foot and heel daily  Cover with a bandaid  Change dressing daily and as needed for drainage leakage or displacement      Surgical shoe to right foot   wear for all ambulation and transfers      Increase protein in your diet with each meal. 3 to 4 servings per day - Meat, chicken, eggs, nut, greek yogurt, legumes, and lentils are good sources of protein.   Keep weight and pressure off wounds      Follow up in 4 week at Wound Management center      Please call the Wound Management Center Monday through Friday between 7-430 for any questions. Monitor for any signs or symptoms of infection such as increase redness or pain, fever/chills, nausea/vomiting, malodor, or increased drainage.      If you have any signs or symptoms please call the wound center, if after hours or on holiday/weekend call your primary care provider or go to the emergency department to be evaluated     Standing Status:   Future     Expiration Date:   4/29/2025

## 2025-04-23 ENCOUNTER — TELEPHONE (OUTPATIENT)
Age: 80
End: 2025-04-23

## 2025-04-23 ENCOUNTER — OFFICE VISIT (OUTPATIENT)
Dept: CARDIOLOGY CLINIC | Facility: CLINIC | Age: 80
End: 2025-04-23
Payer: COMMERCIAL

## 2025-04-23 VITALS
DIASTOLIC BLOOD PRESSURE: 62 MMHG | SYSTOLIC BLOOD PRESSURE: 124 MMHG | HEIGHT: 72 IN | WEIGHT: 164.5 LBS | BODY MASS INDEX: 22.28 KG/M2 | HEART RATE: 68 BPM

## 2025-04-23 DIAGNOSIS — I10 ESSENTIAL HYPERTENSION: Chronic | ICD-10-CM

## 2025-04-23 DIAGNOSIS — E78.49 OTHER HYPERLIPIDEMIA: Chronic | ICD-10-CM

## 2025-04-23 DIAGNOSIS — I63.9 CEREBROVASCULAR ACCIDENT (CVA), UNSPECIFIED MECHANISM (HCC): ICD-10-CM

## 2025-04-23 DIAGNOSIS — I48.0 PAF (PAROXYSMAL ATRIAL FIBRILLATION) (HCC): Primary | ICD-10-CM

## 2025-04-23 DIAGNOSIS — I42.9 CARDIOMYOPATHY, UNSPECIFIED TYPE (HCC): ICD-10-CM

## 2025-04-23 PROCEDURE — 99214 OFFICE O/P EST MOD 30 MIN: CPT | Performed by: INTERNAL MEDICINE

## 2025-04-23 RX ORDER — LINAGLIPTIN 5 MG/1
5 TABLET, FILM COATED ORAL 2 TIMES DAILY
COMMUNITY

## 2025-04-23 NOTE — ASSESSMENT & PLAN NOTE
- LVEF 50% on echocardiographic imaging October 2024  - Discussed with patient about initiation of diuretic regimen however at this time he declines but will continue dietary and lifestyle modifications  - Continue lisinopril 20 mg twice daily  - Continue to monitor

## 2025-04-23 NOTE — ASSESSMENT & PLAN NOTE
- Patient notes blood pressures at home well-controlled  -Continue current medical therapy with amlodipine 5 mg twice daily, atenolol 25 mg daily, lisinopril 20 mg twice daily  - Continue to monitor

## 2025-04-23 NOTE — ASSESSMENT & PLAN NOTE
- Patient will continue to follow with neurology team  -Counseled on dietary and lifestyle modifications  -Aspirin therapy managed by neurology  -Continue Eliquis 5 mg twice daily for stroke risk reduction given paroxysmal atrial fibrillation.

## 2025-04-23 NOTE — PROGRESS NOTES
Patient ID: Gold Van is a 79 y.o. male.        Plan:      Assessment & Plan  Cerebrovascular accident (CVA), unspecified mechanism (HCC)  - Patient will continue to follow with neurology team  -Counseled on dietary and lifestyle modifications  -Aspirin therapy managed by neurology  -Continue Eliquis 5 mg twice daily for stroke risk reduction given paroxysmal atrial fibrillation.  Essential hypertension  - Patient notes blood pressures at home well-controlled  -Continue current medical therapy with amlodipine 5 mg twice daily, atenolol 25 mg daily, lisinopril 20 mg twice daily  - Continue to monitor  Other hyperlipidemia  - Counseled patient on dietary and lifestyle modifications  -Continue atorvastatin 40 mg daily  PAF (paroxysmal atrial fibrillation) (HCC)  - Appears regular on exam today  -Discussed with patient about recommendations for implantable loop recorder however at this time he wishes to hold off but will reconsider at next office visit  -Will continue atenolol 25 mg daily and Eliquis 5 mg twice daily  -Continue to monitor  Cardiomyopathy, unspecified type (HCC)  - LVEF 50% on echocardiographic imaging October 2024  - Discussed with patient about initiation of diuretic regimen however at this time he declines but will continue dietary and lifestyle modifications  - Continue lisinopril 20 mg twice daily  - Continue to monitor      Follow up Plan/Other summary comments:  -Lipid panel/20 6/2024 showing total cholesterol 120, triglycerides 75, LDL 42, LDL 63  -Patient counseled dietary and lifestyle modifications  - Will continue atenolol 25 mg daily, amlodipine 5 mg twice daily, atorvastatin 40 mg daily, Eliquis milligrams twice daily, lisinopril 20 mg twice daily  - Counseled on dietary and lifestyle modifications including following a low-salt, low-fat, heart healthy diet with sodium restriction to less than 1800 mg of sodium daily, DASH diet, NSAID avoidance  - Would recommend implantable loop  recorder for ongoing monitoring of atrial function given stroke history  - Will check CMP, CBC and fasting lipid panel prior to next office visit  - Will see patient in 6 months or sooner if necessary  - Patient counseled if he were to have any warning or alarm type symptoms he is to seek emergency medical care immediately.    HPI:   - Patient is a 79-year-old male with hospitalization in September and then again in October 2020 for stay for COVID and then for worsening respiratory status and suspected COPD exacerbation with pneumonia although during that time was also found to have new onset right-sided ataxia and neurology was consulted.  He was initially started on dual antiplatelet therapy as imaging had showed acute to subacute infarction MRI of the brain showed multiple foci with concern raised for possible hypoperfusion but not rule out embolic etiology.  He underwent echocardiographic imaging which showed low normal function with regional variation in the posterior wall and cardiology was consulted and saw patient to recommend outpatient ambulatory event monitoring and stress testing.  He initially presented to the office in November 2024 for hospital follow-up.  On testing patient was found to have paroxysmal atrial fibrillation and was recommended for initiation anticoagulation.  He had wished to speak with neurology team about this prior to initiation but eventually was able to be started on with reduction in antiplatelet therapy.  - He presents to the office today for follow-up.  Currently in the office today he denies any chest pain, palpitations, lightheadedness or dizziness, loss of consciousness, shortness of breath and notes lower extremity edema seems to be around his baseline.  He notes up until a couple of weeks ago he was going to the gym and exercising and being physically active with no significant exertional symptoms or issues.  He notes the only reason he stopped is because he was told by  his podiatrist and wound care team about 2 weeks ago that he had to reduce the impact on the wound on his right foot.  He notes blood pressures at home are well-controlled and denies any significant bleeding issues.      Most recent or relevant cardiac/vascular testing:    -Transthoracic echocardiogram 10/1/2024 showing left-ventricular systolic function lower limits of normal estimated at 50% with hypokinesis of the posterior wall grade 1 diastolic dysfunction, mild aortic regurgitation, right ventricular systolic function with estimated RVSP 37 mmHg with IVC normal in size    -Ambulatory event monitor 1 6/2025 showing predominantly sinus rhythm average heart rate 74 bpm with difficult pauses or advanced AV block.  There was 1 nonsustained ventricular run lasting 5 beats.  There was occasional ventricular ectopic activity counted for 2% of total monitored beats.  There was a K supraventricular ectopic activity clinic with 3% of total monitored beats.  The patient had paroxysmal atrial fibrillation/atrial flutter approximately 2% of the monitored timeframe large isolated to 12/28/2024 with an average heart rate of 93/min.    -Pharmacologic nuclear stress test 1/9/2025 showing no diagnostic evidence of ischemia on perfusion imaging.        Past Surgical History:   Procedure Laterality Date    CARPAL TUNNEL RELEASE Left 08/2024    CATARACT EXTRACTION Left 07/2024    COLONOSCOPY      KNEE CARTILAGE SURGERY Right 1964    VASECTOMY      WISDOM TOOTH EXTRACTION      x4       Review of Systems   Review of Systems   Constitutional:  Negative for chills, diaphoresis, fatigue and fever.   HENT:  Negative for trouble swallowing and voice change.    Eyes:  Negative for pain and redness.   Respiratory:  Negative for shortness of breath and wheezing.    Cardiovascular:  Positive for leg swelling (chronic baseline). Negative for chest pain and palpitations.   Gastrointestinal:  Negative for abdominal pain, blood in stool,  constipation, diarrhea, nausea and vomiting.   Genitourinary:  Negative for dysuria.   Musculoskeletal:  Positive for arthralgias. Negative for neck pain and neck stiffness.   Skin:  Positive for wound (on right foot).   Neurological:  Negative for dizziness, syncope, light-headedness and headaches.   Psychiatric/Behavioral:  Negative for agitation and confusion.    All other systems reviewed and are negative.         Objective:     /62   Pulse 68   Ht 6' (1.829 m)   Wt 74.6 kg (164 lb 8 oz)   BMI 22.31 kg/m²     PHYSICAL EXAM:  Physical Exam  Vitals reviewed.   Constitutional:       General: He is not in acute distress.     Appearance: Normal appearance. He is not diaphoretic.   HENT:      Head: Normocephalic and atraumatic.   Eyes:      General:         Right eye: No discharge.         Left eye: No discharge.   Neck:      Comments: Trachea midline, minimal JVD appreciated  Cardiovascular:      Rate and Rhythm: Normal rate and regular rhythm.      Heart sounds:      No friction rub.   Pulmonary:      Effort: No respiratory distress.      Breath sounds: No wheezing.   Chest:      Chest wall: No tenderness.   Abdominal:      General: Bowel sounds are normal. There is no distension.      Palpations: Abdomen is soft.      Tenderness: There is no abdominal tenderness. There is no rebound.   Musculoskeletal:      Right lower leg: Edema (1+) present.      Left lower leg: Edema (1+) present.   Skin:     General: Skin is warm and dry.   Neurological:      Mental Status: He is alert.      Comments: Awake, alert, able to answer questions appropriately, able to move extremities bilaterally.   Psychiatric:         Mood and Affect: Mood normal.         Behavior: Behavior normal.            Meds reviewed.  Current Outpatient Medications on File Prior to Visit   Medication Sig Dispense Refill    albuterol (ACCUNEB) 1.25 MG/3ML nebulizer solution Take 1.25 mg by nebulization every 6 (six) hours as needed for wheezing       albuterol (PROVENTIL HFA,VENTOLIN HFA) 90 mcg/act inhaler Ventolin HFA 90 mcg/actuation aerosol inhaler      amLODIPine (NORVASC) 5 mg tablet Take 5 mg by mouth 2 (two) times a day      aspirin 81 mg chewable tablet Chew 1 tablet (81 mg total) daily      atenolol (TENORMIN) 25 mg tablet Take 25 mg by mouth daily      atorvastatin (LIPITOR) 40 mg tablet Take 1 tablet (40 mg total) by mouth every evening 30 tablet 0    Budeson-Glycopyrrol-Formoterol (Breztri Aerosphere) 160-9-4.8 MCG/ACT AERO Take 2 puffs by mouth Every 12 hours      collagenase (SANTYL) ointment Apply topically daily To wounds of R lateral foot and heel 30 g 1    Eliquis 5 MG  (Patient taking differently: Take 5 mg by mouth 2 (two) times a day)      famotidine (PEPCID) 20 mg tablet Take 1 tablet (20 mg total) by mouth 2 (two) times a day      glipiZIDE (GLUCOTROL XL) 2.5 mg 24 hr tablet Take 2.5 mg by mouth 2 (two) times a day      guaiFENesin (MUCINEX) 600 mg 12 hr tablet Take 1 tablet (600 mg total) by mouth 2 (two) times a day      linaGLIPtin (Tradjenta) 5 MG TABS Take 5 mg by mouth 2 (two) times a day      lisinopril (ZESTRIL) 20 mg tablet Take 20 mg by mouth 2 (two) times a day      LORazepam (ATIVAN) 0.5 mg tablet Take 0.5 mg by mouth if needed      metFORMIN (GLUCOPHAGE-XR) 750 mg 24 hr tablet Take 1 tablet (750 mg total) by mouth daily with dinner      montelukast (SINGULAIR) 10 mg tablet Take 1 tablet (10 mg total) by mouth daily at bedtime      Multiple Vitamins-Minerals (Multivitamin Adults) TABS Take 1 tablet by mouth daily      OneTouch Ultra test strip       predniSONE 5 mg tablet Take 1 tablet (5 mg total) by mouth every other day (Patient taking differently: Take 5 mg by mouth daily)      [DISCONTINUED] predniSONE 5 mg tablet Take 3 tablets (15 mg total) by mouth every other day (Patient not taking: Reported on 4/2/2025)       No current facility-administered medications on file prior to visit.      Past Medical History:   Diagnosis  Date    Asthma     COVID-19     CVA (cerebral vascular accident) (HCC)     Diabetes mellitus (HCC)     Environmental allergies     Hyperlipidemia     Hypertension     Macular degeneration 07/13/2020    right eye    Orthostatic hypotension     Osteopenia     Pneumonia     Pneumothorax     Sinusitis        Social History     Tobacco Use   Smoking Status Former   Smokeless Tobacco Never   Tobacco Comments    quit about 25 years ago     Family History   Problem Relation Age of Onset    Asthma Mother     Emphysema Mother     Cancer Father     Asthma Brother     Cancer Brother

## 2025-04-23 NOTE — ASSESSMENT & PLAN NOTE
- Appears regular on exam today  -Discussed with patient about recommendations for implantable loop recorder however at this time he wishes to hold off but will reconsider at next office visit  -Will continue atenolol 25 mg daily and Eliquis 5 mg twice daily  -Continue to monitor

## 2025-04-23 NOTE — TELEPHONE ENCOUNTER
Caller: Gabe ([facility])    Doctor/Office: Dr Newton     Call regarding :  Gabe calling on behalf on Dr. Galeas regarding two referrals he put in system for a STAT / Urgent appointment to Vascular. Current appointment in June. Dr. Galeas would like patient seen ASAP.    Call was transferred to: Janet (Vascular scheduling). Warm transfer.

## 2025-04-27 NOTE — PATIENT INSTRUCTIONS
PAD with R foot wounds        Recommend RLE angiogram +/-intervention    Keep your fluids up the day before, the day off and for 2 days after the angiogram    Hold Eliquis for 2 days/4 doses prior to the study    Do not stop aspirin    Local care per podiatry    Discussed the importance of good diabetes management, diabetic foot care, avoidance of wounds and monitoring

## 2025-04-28 ENCOUNTER — OFFICE VISIT (OUTPATIENT)
Dept: VASCULAR SURGERY | Facility: CLINIC | Age: 80
End: 2025-04-28
Attending: PODIATRIST
Payer: COMMERCIAL

## 2025-04-28 ENCOUNTER — PREP FOR PROCEDURE (OUTPATIENT)
Dept: VASCULAR SURGERY | Facility: CLINIC | Age: 80
End: 2025-04-28

## 2025-04-28 ENCOUNTER — TELEPHONE (OUTPATIENT)
Dept: VASCULAR SURGERY | Facility: CLINIC | Age: 80
End: 2025-04-28

## 2025-04-28 VITALS
HEART RATE: 80 BPM | HEIGHT: 72 IN | OXYGEN SATURATION: 96 % | DIASTOLIC BLOOD PRESSURE: 66 MMHG | WEIGHT: 163 LBS | RESPIRATION RATE: 18 BRPM | BODY MASS INDEX: 22.08 KG/M2 | SYSTOLIC BLOOD PRESSURE: 134 MMHG

## 2025-04-28 DIAGNOSIS — E11.8 TYPE 2 DIABETES MELLITUS WITH COMPLICATION, WITHOUT LONG-TERM CURRENT USE OF INSULIN (HCC): Chronic | ICD-10-CM

## 2025-04-28 DIAGNOSIS — M86.471 CHRONIC OSTEOMYELITIS OF RIGHT FOOT WITH DRAINING SINUS (HCC): ICD-10-CM

## 2025-04-28 DIAGNOSIS — E11.621 DIABETIC ULCER OF RIGHT HEEL ASSOCIATED WITH TYPE 2 DIABETES MELLITUS, WITH FAT LAYER EXPOSED (HCC): ICD-10-CM

## 2025-04-28 DIAGNOSIS — L97.412 DIABETIC ULCER OF RIGHT HEEL ASSOCIATED WITH TYPE 2 DIABETES MELLITUS, WITH FAT LAYER EXPOSED (HCC): ICD-10-CM

## 2025-04-28 DIAGNOSIS — I10 ESSENTIAL HYPERTENSION: Chronic | ICD-10-CM

## 2025-04-28 DIAGNOSIS — E78.49 OTHER HYPERLIPIDEMIA: Chronic | ICD-10-CM

## 2025-04-28 DIAGNOSIS — I73.9 SEVERE PERIPHERAL ARTERIAL DISEASE (HCC): ICD-10-CM

## 2025-04-28 DIAGNOSIS — I77.1 STENOSIS OF ARTERY (HCC): ICD-10-CM

## 2025-04-28 DIAGNOSIS — I48.0 PAF (PAROXYSMAL ATRIAL FIBRILLATION) (HCC): ICD-10-CM

## 2025-04-28 DIAGNOSIS — L97.512 ULCER OF RIGHT FOOT WITH FAT LAYER EXPOSED (HCC): Primary | ICD-10-CM

## 2025-04-28 PROCEDURE — 99204 OFFICE O/P NEW MOD 45 MIN: CPT | Performed by: PHYSICIAN ASSISTANT

## 2025-04-28 NOTE — ASSESSMENT & PLAN NOTE
- Maintain good blood pressure, cholesterol and diabetes control per primary care       
- Maintain good blood pressure, cholesterol and diabetes control per primary care       
-Eliquis 5 twice a day       
-Management as above under right foot ulceration  Orders:    Ambulatory Referral to Vascular Surgery    
-Management as above under right foot ulceration  Orders:    Ambulatory Referral to Vascular Surgery    
-R 5th MT/ lateral foot ulceration present x 5 weeks and small R heel wound  -Followed in the Wound Care clinic    -JAIME 4/18/24:    R 0.42/15/16; > 75% pSFA, 50-75% dSFA. Occlusion distal PTA. Diffuse calcification fem-pop and tibioperoneal segments.    L 1.05/85/45; Occlusion PT and AT arteries. Diffuse calcification fem-pop and tibioperoneal segments.    -MRI 4/17/25: R 5th Met head ulcer with possible early OM    Plan:  -Non-healing R diabetic foot ulceration concern for OM with significant severe PAD with low RIC 0.42 and healing pressures below healing in diabetic patient.   -Followed in the Wound Care clinic  -Due to findings on LEADS and MRI R foot, patient was not recently debrided and treated with Betadine with referral to vascular surgery  -Review of Dopplers demonstrates atherosclerotic disease throughout the SFA with distal SFA occlusion, heavy calcific shadowing throughout the remaining femoro-popliteal and tibio-peroneal segments. RIC is 0.42 with metatarsal great toe pressure 15 and 16 mmHg respectively which is well below healing level in a diabetic.  -Exam reveals 2+ femoral pulses intact very monophasic/hollow doppler signals in the feet.  -Discussed the pathophysiology and options for treatment of PAD, including indications for endovascular/ surgical interventions.  -Discussed the importance of good diabetes management, diabetic foot care, avoidance of wounds and monitoring  -Local care per podiatry/wound center  -Given diabetic foot ulceration with significant PAD and healing pressures below the level of healing, recommend RLE angiogram +/-intervention at next available  - We discussed the indications, risks and benefits of angiography, and he and wife agreed to proceed  -He will be officially consented at time of the procedure    He lives in [facility] and referred to Dr. Allison so we will coordinate for her to do the procedure if scheduling allows, or next available.       
-Uncontrolled DM  -Discussed the importance of good diabetes management in context of PAD and healing  -Management per PCP  Lab Results   Component Value Date    HGBA1C 9.3 (H) 04/09/2025            
Xray Shoulder 2 Views, Left

## 2025-04-28 NOTE — TELEPHONE ENCOUNTER
Verified patient's insurance   CONFIRMED - Patient's insurance is [email or web address]  Is patient requesting a call when authorization has been obtained? Patient did not request a call.    Surgery Date: 5/1/25  Primary Surgeon: ALLIE // Estefany Pandey (NPI: [id number])  Assisting Surgeon: Not Applicable (N/A)  Facility: [facility] (Tax: [id number] / NPI: [id number])  Inpatient / Outpatient: Outpatient  Level: 2    Clearance Received: No clearance ordered.  Consent Received: Consent will be signed day of procedure.  Medication Hold / Last Dose:  No hold on ASA, Hold Eliquis 2 days prior, last dose 4/28/25  IR Notified:  Notified 4/28/25  Rep. Notified: Not Applicable (N/A)  Equipment Needs: Not Applicable (N/A)  Vas Lab Requested: Not Applicable (N/A)  Patient Contacted: 4/28/25    Lyft Ride: NO  Pickup Date/Time: Not Applicable (N/A)    Diagnosis: E11.621, L97.512  Procedure/ CPT Code(s): Angiogram // CPT: 72283, 51528, and 65503  // Procedure to take place in OR [Auth/ Cert Based]    For varicose vein related procedures:   Last LEVDR: Not Applicable (N/A)  CEAP Classification: Not Applicable (N/A)  VCSS: Not Applicable (N/A)  PICTURES: Not Applicable (N/A)    Post Operative Date/ Time: 5/27/25 , 11a [place] with Estefany Pandey (NPI: [id number]) - patient aware     *Please review medication hold(s), PATs, and check H&P with patient.*  PATIENT WAS MAILED SURGERY/SHOWERING/DISCHARGE/COVID INSTRUCTIONS AFTER REVIEWING WITH THEM VIA PHONE CALL.

## 2025-04-28 NOTE — TELEPHONE ENCOUNTER
Operative Scheduling Information:     Hospital:  [facility], [facility], [facility], Any IR/endo suite.     Physician:  Doctor, Any other surgeon, and Any IR doc     Surgery: Right lower extremity angiogram plus or minus intervention     Urgency:  Urgent: ~ 2 weeks     Level:  Level 2: Outpatients to be scheduled for surgery with time dependent medical necessity within 2 weeks     Case Length:  Normal     Post-op Bed:  Outpatient     OR Table:  Standard     Equipment Needs:  None     Medication Instructions:  Eliquis:  Hold for 2 days prior to procedure  Aspirin: Do not hold     Hydration:  No     Contrast Allergy:  no

## 2025-04-28 NOTE — TELEPHONE ENCOUNTER
REMINDER: Under Reason For Call, comments MUST be formatted as:   (Surgeon's Initials) / (Procedure)      Special Instructions/FYI/Dialysis Days: Preferably Dr. Galicia or Dr. Pandey, 2 weeks, and closer to patient home address preferably [place] or [place].    Clearances: no clearance.    Consent: Consent will be signed day of procedure.    For Surgical Clearances     Levels   1-3   ROUTE this encounter to [facility] Surgery Coordinator Pool     Level   4   ROUTE this encounter to [facility] Surgery Coordinator Pool       HYDRATION CLEARANCES   ONLY ROUTE TO  [facility] Surgery Coordinator Pool       Yes, I have ROUTED this encounter to [facility] Surgery Coordinator.

## 2025-04-28 NOTE — PROGRESS NOTES
Name: Gold Van      : 1945      MRN: [id number]  Encounter Provider: Ct Upton PA-C  Encounter Date: 2025   Encounter department: [facility] [place]  :  Assessment & Plan  Ulcer of right foot with fat layer exposed (HCC)  -R 5th MT/ lateral foot ulceration present x 5 weeks and small R heel wound  -Followed in the Wound Care clinic    -JAIME 24:    R 0.42/15/16; > 75% pSFA, 50-75% dSFA. Occlusion distal PTA. Diffuse calcification fem-pop and tibioperoneal segments.    L 1.05/85/45; Occlusion PT and AT arteries. Diffuse calcification fem-pop and tibioperoneal segments.    -MRI 25: R 5th Met head ulcer with possible early OM    Plan:  -Non-healing R diabetic foot ulceration concern for OM with significant severe PAD with low RIC 0.42 and healing pressures below healing in diabetic patient.   -Followed in the Wound Care clinic  -Due to findings on LEADS and MRI R foot, patient was not recently debrided and treated with Betadine with referral to vascular surgery  -Review of Dopplers demonstrates atherosclerotic disease throughout the SFA with distal SFA occlusion, heavy calcific shadowing throughout the remaining femoro-popliteal and tibio-peroneal segments. RIC is 0.42 with metatarsal great toe pressure 15 and 16 mmHg respectively which is well below healing level in a diabetic.  -Exam reveals 2+ femoral pulses intact very monophasic/hollow doppler signals in the feet.  -Discussed the pathophysiology and options for treatment of PAD, including indications for endovascular/ surgical interventions.  -Discussed the importance of good diabetes management, diabetic foot care, avoidance of wounds and monitoring  -Local care per podiatry/wound center  -Given diabetic foot ulceration with significant PAD and healing pressures below the level of healing, recommend RLE angiogram +/-intervention at next available  - We discussed the indications, risks and benefits of angiography, and he  and wife agreed to proceed  -He will be officially consented at time of the procedure    He lives in [facility] and referred to Dr. Allison so we will coordinate for her to do the procedure if scheduling allows, or next available.       Severe peripheral arterial disease (HCC)  -Management as above under right foot ulceration  Orders:    Ambulatory Referral to Vascular Surgery    Type 2 diabetes mellitus with complication, without long-term current use of insulin (HCC)  -Uncontrolled DM  -Discussed the importance of good diabetes management in context of PAD and healing  -Management per PCP  Lab Results   Component Value Date    HGBA1C 9.3 (H) 04/09/2025            PAF (paroxysmal atrial fibrillation) (HCC)  -Eliquis 5 twice a day       Essential hypertension  - Maintain good blood pressure, cholesterol and diabetes control per primary care       Other hyperlipidemia  - Maintain good blood pressure, cholesterol and diabetes control per primary care       Stenosis of artery (HCC)  - Management as above under right foot ulceration  Orders:    Ambulatory Referral to Vascular Surgery    Diabetic ulcer of right heel associated with type 2 diabetes mellitus, with fat layer exposed (HCC)  -Management as above under right foot ulceration  Lab Results   Component Value Date    HGBA1C 9.3 (H) 04/09/2025       Orders:    Ambulatory Referral to Vascular Surgery    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     Physician:  Doctor, Any other surgeon, and Any IR doc    Surgery: Right lower extremity angiogram plus or minus intervention    Urgency:  Urgent: ~ 2 weeks    Level:  Level 2: Outpatients to be scheduled for surgery with time dependent medical necessity within 2  weeks    Case Length:  Normal    Post-op Bed:  Outpatient    OR Table:  Standard    Equipment Needs:  None    Medication Instructions:  Eliquis:  Hold for 2 days prior to procedure  Aspirin: Do not hold    Hydration:  No    Contrast Allergy:  no        History of Present Illness   Patient is new to our practice and was referred by Dr. Galeas. Pt had a JAIME on 4/18/25. Pt has non-healing wounds on the R foot for the past 5 weeks. Pt has noticed some improvement.     HPI  Gold Van is a 79 y.o. male asthma, COPD, diabetes, neuropathy, Lfoot drop, hypertension, hyperlipidemia, Parox AF, L ICA aneurysm, Hx strokes/ severe IC stenosis/ multiple acute B frontoparietal infarcts 10/2024, CMP, peripheral arterial disease referred to vascular surgery for DFU.     Patient presents accompanied by his wife.  He spontaneously developed right lateral/fifth metatarsal wound about 5 weeks ago.  He also has complaint of a heel breakdown which seems to occur periodically/ annually.  He was initially seen by his regular podiatrist Dr. Holloway who referred him to wound care.  He was seen by Dr. Galeas and his team multiple times with little progress in healing.  patient was sent for noninvasive studies.  Due to 's severe peripheral arterial occlusive disease, patient's track was changed to treatment with Betadine, monitoring and evaluation by vascular.  He is currently to limit weightbearing    He feels the wounds are stable and not worsening. He has no fevers or chills.  Historically, he walks is much as he wants to walk without claudication.  In fact, prior to foot wound, he was going to the gym several times weekly walking on a treadmill, cycling and doing an elliptical.  Occasional numbness and tingling in the toes.    Diabetes has been uncontrolled ~ 10.    We reviewed his vascular testing and indications, risks and benefits of angiogram.  Will set up that next available. We discussed reasons why he should seek medical  attention sooner    Medical therapy includes: Eliquis 5 BID, aspirin 81, atorvastatin 40, Tradjenta, amlodipine, metformin, etc    BUN/creat 20/1.03, eGFR 68    -R foot ulceration x 5 weeks      Review of Systems   Musculoskeletal:  Positive for gait problem.   Skin:  Positive for color change and wound.   Hematological:  Bruises/bleeds easily.        Objective   /66 (BP Location: Left arm, Patient Position: Sitting)   Pulse 80   Resp 18   Ht 6' (1.829 m)   Wt 73.9 kg (163 lb)   SpO2 96%   BMI 22.11 kg/m²      2+ femoral pulses    R foot AT/DP/PT hollow/monophasic  L foot monophasic DP. PT hollow/monophasic    2+ pitting pedal edema. Mild LE edema. Mild chronic stasis changes        R 5th met wound 18 mm x 20 mm  Motor sensory intact      R heel small crack/wound      Physical Exam  Vitals and nursing note reviewed.   Constitutional:       Appearance: He is well-developed.   HENT:      Head: Normocephalic and atraumatic.   Eyes:      Pupils: Pupils are equal, round, and reactive to light.   Neck:      Thyroid: No thyromegaly.      Vascular: No JVD.      Trachea: Trachea normal.   Cardiovascular:      Rate and Rhythm: Normal rate and regular rhythm.      Pulses:           Carotid pulses are 2+ on the right side and 2+ on the left side.       Radial pulses are 2+ on the right side and 2+ on the left side.        Femoral pulses are 2+ on the right side and 2+ on the left side.       Dorsalis pedis pulses are detected w/ Doppler on the right side and detected w/ Doppler on the left side.        Posterior tibial pulses are detected w/ Doppler on the right side and detected w/ Doppler on the left side.      Heart sounds: Normal heart sounds, S1 normal and S2 normal. No murmur heard.     No friction rub. No gallop.   Pulmonary:      Effort: Pulmonary effort is normal. No accessory muscle usage or respiratory distress.      Breath sounds: Normal breath sounds. No wheezing or rales.   Abdominal:      General:  Bowel sounds are normal. There is no distension.      Palpations: Abdomen is soft.      Tenderness: There is no abdominal tenderness.   Musculoskeletal:         General: No deformity. Normal range of motion.      Cervical back: Neck supple.      Right lower leg: Edema present.      Left lower leg: Edema present.   Skin:     General: Skin is warm and dry.      Findings: No lesion or rash.      Nails: There is no clubbing.   Neurological:      Mental Status: He is alert and oriented to person, place, and time.      Comments: Grossly normal    Psychiatric:         Behavior: Behavior is cooperative.         MRI 4/17/25:  IMPRESSION:    Exam moderately limited due to patient motion.     In the right foot, there is ulcer lateral to the fifth metatarsal head (series 6 image 22.)     There is discordant marrow signal abnormality (Hyperintense on T2WI but no confluent medullary hypointensity on T1WI) in the fifth metatarsal head (series 3 image 24, and series 9 images 22-32.)  Because there are associated adjacent secondary findings   of ulcer, this could represent early osteomyelitis.      JAIME 4/18/24  [facility] REPORT  CLINICAL:  Indications:  Patient presents with abnormal RIC in office and an ulcer on his right heel  which started a couple of weeks ago.  Patient denies any pain with walking.  Operative History:  Denies Any Cardiovascular Surgeries  Risk Factors  The patient has history of HTN, Diabetes and Hyperlipidemia.  Clinical  Right Pressure:  120/ mm Hg, Left Pressure:  127/ mm Hg.     FINDINGS:     Segment                Right                   Left                                            Impression  PSV (cm/s)  Impression      PSV (cm/s)    Ant. Tibial            NC                                                    Common Femoral Artery                      74                          86    Prox Profunda                             100                         133    Prox SFA               >75%                 82                          19    Mid SFA                                    26                          28    Dist SFA               50-75%             134                          22    Proximal Pop                               29                          14    Distal Pop                                 36                          14    Tibioperoneal                              40  Not visualized                Prox Post Tibial                           28                                Dist Post Tibial       Occluded                Occluded                      Dist. Ant. Tibial                          43  Occluded                               CONCLUSION:  Impression:  RIGHT LOWER LIMB:  There is a >75% stenosis noted in the proximal superficial femoral artery and a likely has a noninvasives sent over by podiatry does not want 50-75% stenosis in the distal superficial femoral artery. Occlusion vs high  grade stenosis noted in the distal posterior tibial artery. Diffuse disease with  calcification and heavy shadowing throughout the remaining femoro-popliteal and  tibio-peroneal segments may obscure more significant stenosis.  Ankle/Brachial index: 0.42 which is in the ischemic disease category (Prior  0.83)  PVR/ PPG tracings are dampened.  Metatarsal pressure of  15 mmHg  Great toe pressure of  16 mmHg, below the healing range     LEFT LOWER LIMB:  There is occlusion vs high grade stenosis in the posterior tibial and anterior  tibial arteries. Diffuse disease with calcification and heavy shadowing  throughout the remaining femoro-popliteal and tibio-peroneal segments may  obscure more significant stenosis.  Ankle/Brachial index:   1.06 which is in the normal category (Prior 1.03)  PVR/ PPG tracings are dampened.  Metatarsal pressure of  85 mmHg  Great toe pressure of  45 mmHg, below the healing range for a diabetic patient     Compared to previous study on 7/3/2023 , there is new stenosis and  occlusion on  the right. New occlusion of the left. Significant decrease of right RIC.  Bilateral toe pressure now below healing range.         I have reviewed and made appropriate changes to the review of systems input by the medical assistant.    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    Non-recurrent bilateral inguinal hernia without obstruction or gangrene    Pruritus    History of pneumothorax    Acute respiratory failure with hypoxia ([facility])    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[facility])    Shortness of breath    COVID-19    Acute respiratory insufficiency    Dysmetria    CVA (cerebrovascular accident) ([facility])    Abnormal echocardiogram    Gastroesophageal reflux disease without esophagitis    Impaired mobility and activities of daily living    Neuropathy    Foot drop, left    Moderate protein-calorie malnutrition ([facility])    Other specified peripheral vascular diseases (HCC)    Ulcer of right foot with fat layer exposed ([facility])    PAF (paroxysmal atrial fibrillation) ([facility])    Cardiomyopathy (HCC)       Past Surgical History:   Procedure Laterality Date    CARPAL TUNNEL RELEASE Left 08/2024    CATARACT EXTRACTION Left 07/2024    COLONOSCOPY      KNEE CARTILAGE SURGERY Right 1964    VASECTOMY      WISDOM TOOTH EXTRACTION      x4       Family History   Problem Relation Age of Onset    Asthma Mother     Emphysema Mother     Cancer Father     Asthma Brother     Cancer Brother        Social History     Socioeconomic History    Marital status: /Civil Union     Spouse name: Not on file    Number of children: Not on  file    Years of education: Not on file    Highest education level: Not on file   Occupational History    Not on file   Tobacco Use    Smoking status: Former    Smokeless tobacco: Never    Tobacco comments:     quit about 25 years ago   Vaping Use    Vaping status: Never Used   Substance and Sexual Activity    Alcohol use: Never    Drug use: Never    Sexual activity: Yes     Partners: Female   Other Topics Concern    Not on file   Social History Narrative    Daily caffeine use- 1 cup of tea, 2 cups of coffee     Social Drivers of Health     Financial Resource Strain: Not on file   Food Insecurity: No Food Insecurity (10/17/2024)    Nursing - Inadequate Food Risk Classification     Worried About Running Out of Food in the Last Year: Never true     Ran Out of Food in the Last Year: Never true     Ran Out of Food in the Last Year: Not on file   Transportation Needs: No Transportation Needs (10/17/2024)    PRAPARE - Transportation     Lack of Transportation (Medical): No     Lack of Transportation (Non-Medical): No   Physical Activity: Not on file   Stress: Not on file   Social Connections: Unknown (6/18/2024)    Received from [email or web address]    Social Connections     How often do you feel lonely or isolated from those around you? (Adult - for ages 18 years and over): Not on file   Intimate Partner Violence: Not on file   Housing Stability: Low Risk  (10/17/2024)    Housing Stability Vital Sign     Unable to Pay for Housing in the Last Year: No     Number of Times Moved in the Last Year: 0     Homeless in the Last Year: No       Allergies   Allergen Reactions    Ciprofloxacin Shortness Of Breath    Sulfamethoxazole Shortness Of Breath    Trimethoprim Shortness Of Breath    Dexamethasone Other (See Comments)    Triamcinolone Other (See Comments)    Bactrim [Sulfamethoxazole-Trimethoprim] Fever     Fever of 107         Current Outpatient Medications:     albuterol (ACCUNEB) 1.25 MG/3ML nebulizer solution, Take 1.25 mg by  nebulization every 6 (six) hours as needed for wheezing, Disp: , Rfl:     albuterol (PROVENTIL HFA,VENTOLIN HFA) 90 mcg/act inhaler, Ventolin HFA 90 mcg/actuation aerosol inhaler, Disp: , Rfl:     amLODIPine (NORVASC) 5 mg tablet, Take 5 mg by mouth 2 (two) times a day, Disp: , Rfl:     apixaban (ELIQUIS) 5 mg, 5 mg 2 (two) times a day, Disp: , Rfl:     aspirin 81 mg chewable tablet, Chew 1 tablet (81 mg total) daily, Disp: , Rfl:     atenolol (TENORMIN) 25 mg tablet, Take 25 mg by mouth daily, Disp: , Rfl:     atorvastatin (LIPITOR) 40 mg tablet, Take 1 tablet (40 mg total) by mouth every evening, Disp: 30 tablet, Rfl: 0    Budeson-Glycopyrrol-Formoterol (Breztri Aerosphere) 160-9-4.8 MCG/ACT AERO, Take 2 puffs by mouth Every 12 hours, Disp: , Rfl:     collagenase (SANTYL) ointment, Apply topically daily To wounds of R lateral foot and heel, Disp: 30 g, Rfl: 1    famotidine (PEPCID) 20 mg tablet, Take 1 tablet (20 mg total) by mouth 2 (two) times a day, Disp: , Rfl:     glipiZIDE (GLUCOTROL XL) 2.5 mg 24 hr tablet, Take 2.5 mg by mouth 2 (two) times a day, Disp: , Rfl:     guaiFENesin (MUCINEX) 600 mg 12 hr tablet, Take 1 tablet (600 mg total) by mouth 2 (two) times a day, Disp: , Rfl:     linaGLIPtin (Tradjenta) 5 MG TABS, Take 5 mg by mouth 2 (two) times a day, Disp: , Rfl:     lisinopril (ZESTRIL) 20 mg tablet, Take 20 mg by mouth 2 (two) times a day, Disp: , Rfl:     LORazepam (ATIVAN) 0.5 mg tablet, Take 0.5 mg by mouth if needed, Disp: , Rfl:     metFORMIN (GLUCOPHAGE-XR) 750 mg 24 hr tablet, Take 1 tablet (750 mg total) by mouth daily with dinner, Disp: , Rfl:     montelukast (SINGULAIR) 10 mg tablet, Take 1 tablet (10 mg total) by mouth daily at bedtime, Disp: , Rfl:     Multiple Vitamins-Minerals (Multivitamin Adults) TABS, Take 1 tablet by mouth daily, Disp: , Rfl:     predniSONE 5 mg tablet, Take 1 tablet (5 mg total) by mouth every other day (Patient taking differently: Take 5 mg by mouth daily), Disp:  , Rfl:     OneTouch Ultra test strip, , Disp: , Rfl:

## 2025-04-29 ENCOUNTER — TELEPHONE (OUTPATIENT)
Dept: VASCULAR SURGERY | Facility: CLINIC | Age: 80
End: 2025-04-29

## 2025-04-29 ENCOUNTER — TRANSCRIBE ORDERS (OUTPATIENT)
Dept: VASCULAR SURGERY | Facility: CLINIC | Age: 80
End: 2025-04-29

## 2025-04-29 DIAGNOSIS — L97.512 ULCER OF RIGHT FOOT WITH FAT LAYER EXPOSED (HCC): Primary | ICD-10-CM

## 2025-04-29 DIAGNOSIS — I70.234 ATHEROSCLEROSIS OF NATIVE ARTERIES OF RIGHT LEG WITH ULCERATION OF HEEL AND MIDFOOT (HCC): Primary | ICD-10-CM

## 2025-04-29 DIAGNOSIS — I73.9 SEVERE PERIPHERAL ARTERIAL DISEASE (HCC): ICD-10-CM

## 2025-04-29 PROCEDURE — NC001 PR NO CHARGE: Performed by: SURGERY

## 2025-04-29 NOTE — TELEPHONE ENCOUNTER
Patient was recently seen in our office by Ct Upton and was planned for angiogram with me later this week for his nonhealing right foot wounds.    After my review of the chart and a CT scan from Oct '24, I have noted significant disease in the bilateral common femoral arteries.    I would like to get an updated CTA w/ BLE runoff prior to any intervention to better define his anatomy and for surgical planning.    I predict that he is going to require R femoral endarterectomy and either antegrade endovascular intervention vs fem-? Bypass.    I have called the patient and spoke to him and his wife regarding the change in plan and answered their questions.  He is already scheduled for the CTA next week and a f/u visit with me the following week to discuss options.  We are tentatively saving an operating room time for him after this office visit to expedite his care.

## 2025-05-06 ENCOUNTER — TELEPHONE (OUTPATIENT)
Facility: HOSPITAL | Age: 80
End: 2025-05-06

## 2025-05-06 ENCOUNTER — HOSPITAL ENCOUNTER (OUTPATIENT)
Dept: CT IMAGING | Facility: HOSPITAL | Age: 80
Discharge: HOME | DRG: 872 | End: 2025-05-06
Attending: SURGERY
Payer: COMMERCIAL

## 2025-05-06 DIAGNOSIS — I73.9 SEVERE PERIPHERAL ARTERIAL DISEASE (HCC): ICD-10-CM

## 2025-05-06 DIAGNOSIS — L97.512 ULCER OF RIGHT FOOT WITH FAT LAYER EXPOSED (HCC): ICD-10-CM

## 2025-05-06 PROCEDURE — 75635 CT ANGIO ABDOMINAL ARTERIES: CPT

## 2025-05-06 RX ADMIN — IOHEXOL 120 ML: 350 INJECTION, SOLUTION INTRAVENOUS at 10:26

## 2025-05-07 ENCOUNTER — APPOINTMENT (INPATIENT)
Dept: NON INVASIVE DIAGNOSTICS | Facility: HOSPITAL | Age: 80
DRG: 872 | End: 2025-05-07
Payer: COMMERCIAL

## 2025-05-07 ENCOUNTER — HOSPITAL ENCOUNTER (INPATIENT)
Facility: HOSPITAL | Age: 80
LOS: 4 days | DRG: 872 | End: 2025-05-11
Attending: EMERGENCY MEDICINE | Admitting: INTERNAL MEDICINE
Payer: COMMERCIAL

## 2025-05-07 ENCOUNTER — APPOINTMENT (INPATIENT)
Dept: RADIOLOGY | Facility: HOSPITAL | Age: 80
DRG: 872 | End: 2025-05-07
Payer: COMMERCIAL

## 2025-05-07 ENCOUNTER — TELEPHONE (OUTPATIENT)
Dept: VASCULAR SURGERY | Facility: CLINIC | Age: 80
End: 2025-05-07

## 2025-05-07 DIAGNOSIS — A41.9 SEPSIS (HCC): ICD-10-CM

## 2025-05-07 DIAGNOSIS — I48.0 PAF (PAROXYSMAL ATRIAL FIBRILLATION) (HCC): ICD-10-CM

## 2025-05-07 DIAGNOSIS — L08.9 RIGHT FOOT INFECTION: ICD-10-CM

## 2025-05-07 DIAGNOSIS — I10 ESSENTIAL HYPERTENSION: ICD-10-CM

## 2025-05-07 DIAGNOSIS — A41.9 SEPSIS WITHOUT ACUTE ORGAN DYSFUNCTION, DUE TO UNSPECIFIED ORGANISM (HCC): Primary | ICD-10-CM

## 2025-05-07 DIAGNOSIS — L97.512 ULCER OF RIGHT FOOT WITH FAT LAYER EXPOSED (HCC): ICD-10-CM

## 2025-05-07 DIAGNOSIS — I73.9 PERIPHERAL ARTERIAL DISEASE (HCC): ICD-10-CM

## 2025-05-07 PROBLEM — J45.20 MILD INTERMITTENT ASTHMA WITHOUT COMPLICATION: Status: ACTIVE | Noted: 2017-10-23

## 2025-05-07 PROBLEM — E78.2 MIXED HYPERLIPIDEMIA: Status: ACTIVE | Noted: 2017-10-23

## 2025-05-07 LAB
ALBUMIN SERPL BCG-MCNC: 3.7 G/DL (ref 3.5–5)
ALP SERPL-CCNC: 73 U/L (ref 34–104)
ALT SERPL W P-5'-P-CCNC: 10 U/L (ref 7–52)
ANION GAP SERPL CALCULATED.3IONS-SCNC: 10 MMOL/L (ref 4–13)
APTT PPP: 31 SECONDS (ref 23–34)
APTT PPP: 49 SECONDS (ref 23–34)
AST SERPL W P-5'-P-CCNC: 9 U/L (ref 13–39)
ATRIAL RATE: 103 BPM
BACTERIA UR QL AUTO: NORMAL /HPF
BASOPHILS # BLD AUTO: 0.05 THOUSANDS/ÂΜL (ref 0–0.1)
BASOPHILS NFR BLD AUTO: 0 % (ref 0–1)
BILIRUB SERPL-MCNC: 0.31 MG/DL (ref 0.2–1)
BILIRUB UR QL STRIP: NEGATIVE
BUN SERPL-MCNC: 17 MG/DL (ref 5–25)
CALCIUM SERPL-MCNC: 9.4 MG/DL (ref 8.4–10.2)
CHLORIDE SERPL-SCNC: 102 MMOL/L (ref 96–108)
CLARITY UR: CLEAR
CO2 SERPL-SCNC: 25 MMOL/L (ref 21–32)
COLOR UR: YELLOW
CREAT SERPL-MCNC: 0.95 MG/DL (ref 0.6–1.3)
EOSINOPHIL # BLD AUTO: 0.19 THOUSAND/ÂΜL (ref 0–0.61)
EOSINOPHIL NFR BLD AUTO: 2 % (ref 0–6)
ERYTHROCYTE [DISTWIDTH] IN BLOOD BY AUTOMATED COUNT: 13.3 % (ref 11.6–15.1)
GFR SERPL CREATININE-BSD FRML MDRD: 75 ML/MIN/1.73SQ M
GLUCOSE SERPL-MCNC: 117 MG/DL (ref 65–140)
GLUCOSE SERPL-MCNC: 155 MG/DL (ref 65–140)
GLUCOSE SERPL-MCNC: 261 MG/DL (ref 65–140)
GLUCOSE UR STRIP-MCNC: NEGATIVE MG/DL
HCT VFR BLD AUTO: 36 % (ref 36.5–49.3)
HGB BLD-MCNC: 11.5 G/DL (ref 12–17)
HGB UR QL STRIP.AUTO: ABNORMAL
IMM GRANULOCYTES # BLD AUTO: 0.13 THOUSAND/UL (ref 0–0.2)
IMM GRANULOCYTES NFR BLD AUTO: 1 % (ref 0–2)
INR PPP: 1.14 (ref 0.85–1.19)
KETONES UR STRIP-MCNC: NEGATIVE MG/DL
LACTATE SERPL-SCNC: 1.5 MMOL/L (ref 0.5–2)
LACTATE SERPL-SCNC: 2.4 MMOL/L (ref 0.5–2)
LEUKOCYTE ESTERASE UR QL STRIP: NEGATIVE
LYMPHOCYTES # BLD AUTO: 1.8 THOUSANDS/ÂΜL (ref 0.6–4.47)
LYMPHOCYTES NFR BLD AUTO: 14 % (ref 14–44)
MCH RBC QN AUTO: 29.3 PG (ref 26.8–34.3)
MCHC RBC AUTO-ENTMCNC: 31.9 G/DL (ref 31.4–37.4)
MCV RBC AUTO: 92 FL (ref 82–98)
MONOCYTES # BLD AUTO: 0.94 THOUSAND/ÂΜL (ref 0.17–1.22)
MONOCYTES NFR BLD AUTO: 8 % (ref 4–12)
NEUTROPHILS # BLD AUTO: 9.43 THOUSANDS/ÂΜL (ref 1.85–7.62)
NEUTS SEG NFR BLD AUTO: 75 % (ref 43–75)
NITRITE UR QL STRIP: NEGATIVE
NON-SQ EPI CELLS URNS QL MICRO: NORMAL /HPF
NRBC BLD AUTO-RTO: 0 /100 WBCS
P AXIS: 55 DEGREES
PH UR STRIP.AUTO: 6 [PH]
PLATELET # BLD AUTO: 310 THOUSANDS/UL (ref 149–390)
PMV BLD AUTO: 9.2 FL (ref 8.9–12.7)
POTASSIUM SERPL-SCNC: 3.9 MMOL/L (ref 3.5–5.3)
PR INTERVAL: 142 MS
PROCALCITONIN SERPL-MCNC: 0.08 NG/ML
PROT SERPL-MCNC: 7.1 G/DL (ref 6.4–8.4)
PROT UR STRIP-MCNC: ABNORMAL MG/DL
PROTHROMBIN TIME: 15.1 SECONDS (ref 12.3–15)
QRS AXIS: 78 DEGREES
QRSD INTERVAL: 88 MS
QT INTERVAL: 350 MS
QTC INTERVAL: 458 MS
RBC # BLD AUTO: 3.93 MILLION/UL (ref 3.88–5.62)
RBC #/AREA URNS AUTO: NORMAL /HPF
SODIUM SERPL-SCNC: 137 MMOL/L (ref 135–147)
SP GR UR STRIP.AUTO: 1.01
T WAVE AXIS: 92 DEGREES
UROBILINOGEN UR QL STRIP.AUTO: 0.2 E.U./DL
VENTRICULAR RATE: 103 BPM
WBC # BLD AUTO: 12.54 THOUSAND/UL (ref 4.31–10.16)
WBC #/AREA URNS AUTO: NORMAL /HPF

## 2025-05-07 PROCEDURE — 99285 EMERGENCY DEPT VISIT HI MDM: CPT

## 2025-05-07 PROCEDURE — 93010 ELECTROCARDIOGRAM REPORT: CPT | Performed by: INTERNAL MEDICINE

## 2025-05-07 PROCEDURE — 85025 COMPLETE CBC W/AUTO DIFF WBC: CPT

## 2025-05-07 PROCEDURE — 99223 1ST HOSP IP/OBS HIGH 75: CPT | Performed by: SURGERY

## 2025-05-07 PROCEDURE — 93005 ELECTROCARDIOGRAM TRACING: CPT

## 2025-05-07 PROCEDURE — 80053 COMPREHEN METABOLIC PANEL: CPT

## 2025-05-07 PROCEDURE — 84145 PROCALCITONIN (PCT): CPT

## 2025-05-07 PROCEDURE — 93971 EXTREMITY STUDY: CPT

## 2025-05-07 PROCEDURE — 85730 THROMBOPLASTIN TIME PARTIAL: CPT

## 2025-05-07 PROCEDURE — 87040 BLOOD CULTURE FOR BACTERIA: CPT

## 2025-05-07 PROCEDURE — 96375 TX/PRO/DX INJ NEW DRUG ADDON: CPT

## 2025-05-07 PROCEDURE — 99222 1ST HOSP IP/OBS MODERATE 55: CPT | Performed by: INTERNAL MEDICINE

## 2025-05-07 PROCEDURE — 96361 HYDRATE IV INFUSION ADD-ON: CPT

## 2025-05-07 PROCEDURE — 96365 THER/PROPH/DIAG IV INF INIT: CPT

## 2025-05-07 PROCEDURE — 73630 X-RAY EXAM OF FOOT: CPT

## 2025-05-07 PROCEDURE — 93971 EXTREMITY STUDY: CPT | Performed by: SURGERY

## 2025-05-07 PROCEDURE — 36415 COLL VENOUS BLD VENIPUNCTURE: CPT

## 2025-05-07 PROCEDURE — 85730 THROMBOPLASTIN TIME PARTIAL: CPT | Performed by: NURSE PRACTITIONER

## 2025-05-07 PROCEDURE — 83605 ASSAY OF LACTIC ACID: CPT

## 2025-05-07 PROCEDURE — 85610 PROTHROMBIN TIME: CPT

## 2025-05-07 PROCEDURE — 82948 REAGENT STRIP/BLOOD GLUCOSE: CPT

## 2025-05-07 PROCEDURE — 81001 URINALYSIS AUTO W/SCOPE: CPT

## 2025-05-07 PROCEDURE — 99222 1ST HOSP IP/OBS MODERATE 55: CPT | Performed by: PODIATRIST

## 2025-05-07 RX ORDER — OXYCODONE HYDROCHLORIDE 5 MG/1
5 TABLET ORAL EVERY 4 HOURS PRN
Refills: 0 | Status: DISCONTINUED | OUTPATIENT
Start: 2025-05-07 | End: 2025-05-11 | Stop reason: HOSPADM

## 2025-05-07 RX ORDER — INSULIN LISPRO 100 [IU]/ML
1-5 INJECTION, SOLUTION INTRAVENOUS; SUBCUTANEOUS
Status: DISCONTINUED | OUTPATIENT
Start: 2025-05-07 | End: 2025-05-11 | Stop reason: HOSPADM

## 2025-05-07 RX ORDER — METRONIDAZOLE 500 MG/100ML
500 INJECTION, SOLUTION INTRAVENOUS EVERY 12 HOURS
Status: DISCONTINUED | OUTPATIENT
Start: 2025-05-07 | End: 2025-05-11 | Stop reason: HOSPADM

## 2025-05-07 RX ORDER — ALBUTEROL SULFATE 90 UG/1
1 INHALANT RESPIRATORY (INHALATION) EVERY 4 HOURS PRN
Status: DISCONTINUED | OUTPATIENT
Start: 2025-05-07 | End: 2025-05-11 | Stop reason: HOSPADM

## 2025-05-07 RX ORDER — ALBUTEROL SULFATE 0.83 MG/ML
1.25 SOLUTION RESPIRATORY (INHALATION) EVERY 6 HOURS PRN
Status: DISCONTINUED | OUTPATIENT
Start: 2025-05-07 | End: 2025-05-07

## 2025-05-07 RX ORDER — HEPARIN SODIUM 1000 [USP'U]/ML
5600 INJECTION, SOLUTION INTRAVENOUS; SUBCUTANEOUS ONCE
Status: COMPLETED | OUTPATIENT
Start: 2025-05-07 | End: 2025-05-07

## 2025-05-07 RX ORDER — AMLODIPINE BESYLATE 5 MG/1
5 TABLET ORAL 2 TIMES DAILY
Status: DISCONTINUED | OUTPATIENT
Start: 2025-05-07 | End: 2025-05-11 | Stop reason: HOSPADM

## 2025-05-07 RX ORDER — FAMOTIDINE 20 MG/1
20 TABLET, FILM COATED ORAL 2 TIMES DAILY
Status: DISCONTINUED | OUTPATIENT
Start: 2025-05-07 | End: 2025-05-11 | Stop reason: HOSPADM

## 2025-05-07 RX ORDER — METFORMIN HYDROCHLORIDE 500 MG/1
500 TABLET, EXTENDED RELEASE ORAL
Status: ON HOLD | COMMUNITY

## 2025-05-07 RX ORDER — HYDROMORPHONE HCL IN WATER/PF 6 MG/30 ML
0.2 PATIENT CONTROLLED ANALGESIA SYRINGE INTRAVENOUS ONCE
Status: COMPLETED | OUTPATIENT
Start: 2025-05-07 | End: 2025-05-07

## 2025-05-07 RX ORDER — PREDNISONE 1 MG/1
5 TABLET ORAL DAILY
Status: DISCONTINUED | OUTPATIENT
Start: 2025-05-07 | End: 2025-05-11 | Stop reason: HOSPADM

## 2025-05-07 RX ORDER — HEPARIN SODIUM 1000 [USP'U]/ML
5600 INJECTION, SOLUTION INTRAVENOUS; SUBCUTANEOUS EVERY 6 HOURS PRN
Status: DISCONTINUED | OUTPATIENT
Start: 2025-05-07 | End: 2025-05-11 | Stop reason: HOSPADM

## 2025-05-07 RX ORDER — ACETAMINOPHEN 325 MG/1
975 TABLET ORAL EVERY 8 HOURS PRN
Status: DISCONTINUED | OUTPATIENT
Start: 2025-05-07 | End: 2025-05-07

## 2025-05-07 RX ORDER — ATENOLOL 25 MG/1
25 TABLET ORAL DAILY
Status: DISCONTINUED | OUTPATIENT
Start: 2025-05-08 | End: 2025-05-11 | Stop reason: HOSPADM

## 2025-05-07 RX ORDER — HEPARIN SODIUM 1000 [USP'U]/ML
2800 INJECTION, SOLUTION INTRAVENOUS; SUBCUTANEOUS EVERY 6 HOURS PRN
Status: DISCONTINUED | OUTPATIENT
Start: 2025-05-07 | End: 2025-05-11 | Stop reason: HOSPADM

## 2025-05-07 RX ORDER — LISINOPRIL 20 MG/1
20 TABLET ORAL 2 TIMES DAILY
Status: DISCONTINUED | OUTPATIENT
Start: 2025-05-07 | End: 2025-05-11 | Stop reason: HOSPADM

## 2025-05-07 RX ORDER — MONTELUKAST SODIUM 10 MG/1
10 TABLET ORAL
Status: DISCONTINUED | OUTPATIENT
Start: 2025-05-07 | End: 2025-05-11 | Stop reason: HOSPADM

## 2025-05-07 RX ORDER — HEPARIN SODIUM 10000 [USP'U]/100ML
3-30 INJECTION, SOLUTION INTRAVENOUS
Status: DISCONTINUED | OUTPATIENT
Start: 2025-05-07 | End: 2025-05-11 | Stop reason: HOSPADM

## 2025-05-07 RX ORDER — CEFAZOLIN SODIUM 2 G/50ML
2000 SOLUTION INTRAVENOUS EVERY 8 HOURS
Status: DISCONTINUED | OUTPATIENT
Start: 2025-05-07 | End: 2025-05-11 | Stop reason: HOSPADM

## 2025-05-07 RX ORDER — LORAZEPAM 0.5 MG/1
0.5 TABLET ORAL EVERY 8 HOURS PRN
Status: DISCONTINUED | OUTPATIENT
Start: 2025-05-07 | End: 2025-05-11 | Stop reason: HOSPADM

## 2025-05-07 RX ORDER — AMLODIPINE BESYLATE 5 MG/1
5 TABLET ORAL 2 TIMES DAILY
Status: DISCONTINUED | OUTPATIENT
Start: 2025-05-07 | End: 2025-05-07

## 2025-05-07 RX ORDER — LISINOPRIL 20 MG/1
20 TABLET ORAL 2 TIMES DAILY
Status: DISCONTINUED | OUTPATIENT
Start: 2025-05-07 | End: 2025-05-07

## 2025-05-07 RX ORDER — ATORVASTATIN CALCIUM 40 MG/1
40 TABLET, FILM COATED ORAL EVERY EVENING
Status: DISCONTINUED | OUTPATIENT
Start: 2025-05-07 | End: 2025-05-11 | Stop reason: HOSPADM

## 2025-05-07 RX ORDER — ACETAMINOPHEN 325 MG/1
975 TABLET ORAL EVERY 8 HOURS SCHEDULED
Status: DISCONTINUED | OUTPATIENT
Start: 2025-05-07 | End: 2025-05-11 | Stop reason: HOSPADM

## 2025-05-07 RX ORDER — OXYCODONE HYDROCHLORIDE 10 MG/1
10 TABLET ORAL EVERY 4 HOURS PRN
Refills: 0 | Status: DISCONTINUED | OUTPATIENT
Start: 2025-05-07 | End: 2025-05-11 | Stop reason: HOSPADM

## 2025-05-07 RX ADMIN — ACETAMINOPHEN 975 MG: 325 TABLET ORAL at 15:52

## 2025-05-07 RX ADMIN — HEPARIN SODIUM 18 UNITS/KG/HR: 10000 INJECTION, SOLUTION INTRAVENOUS at 11:54

## 2025-05-07 RX ADMIN — HYDROMORPHONE HYDROCHLORIDE 0.2 MG: 0.2 INJECTION, SOLUTION INTRAMUSCULAR; INTRAVENOUS; SUBCUTANEOUS at 09:00

## 2025-05-07 RX ADMIN — HEPARIN SODIUM 5600 UNITS: 1000 INJECTION, SOLUTION INTRAVENOUS; SUBCUTANEOUS at 11:53

## 2025-05-07 RX ADMIN — OXYCODONE HYDROCHLORIDE 10 MG: 10 TABLET ORAL at 21:29

## 2025-05-07 RX ADMIN — PREDNISONE 5 MG: 1 TABLET ORAL at 14:59

## 2025-05-07 RX ADMIN — CEFTRIAXONE SODIUM 2000 MG: 10 INJECTION, POWDER, FOR SOLUTION INTRAVENOUS at 09:01

## 2025-05-07 RX ADMIN — SODIUM CHLORIDE 1000 ML: 0.9 INJECTION, SOLUTION INTRAVENOUS at 09:41

## 2025-05-07 RX ADMIN — FAMOTIDINE 20 MG: 20 TABLET, FILM COATED ORAL at 17:19

## 2025-05-07 RX ADMIN — HEPARIN SODIUM 2800 UNITS: 1000 INJECTION, SOLUTION INTRAVENOUS; SUBCUTANEOUS at 19:41

## 2025-05-07 RX ADMIN — SODIUM CHLORIDE 1000 ML: 0.9 INJECTION, SOLUTION INTRAVENOUS at 09:04

## 2025-05-07 RX ADMIN — BUDESONIDE, GLYCOPYRROLATE, AND FORMOTEROL FUMARATE 2 PUFF: 160; 9; 4.8 AEROSOL, METERED RESPIRATORY (INHALATION) at 15:42

## 2025-05-07 RX ADMIN — Medication 1 TABLET: at 14:59

## 2025-05-07 RX ADMIN — OXYCODONE HYDROCHLORIDE 10 MG: 10 TABLET ORAL at 15:52

## 2025-05-07 RX ADMIN — INSULIN LISPRO 2 UNITS: 100 INJECTION, SOLUTION INTRAVENOUS; SUBCUTANEOUS at 21:32

## 2025-05-07 RX ADMIN — CEFAZOLIN SODIUM 2000 MG: 2 SOLUTION INTRAVENOUS at 23:17

## 2025-05-07 RX ADMIN — MONTELUKAST 10 MG: 10 TABLET, FILM COATED ORAL at 21:30

## 2025-05-07 RX ADMIN — ACETAMINOPHEN 975 MG: 325 TABLET ORAL at 23:16

## 2025-05-07 RX ADMIN — METRONIDAZOLE 500 MG: 500 INJECTION, SOLUTION INTRAVENOUS at 15:41

## 2025-05-07 RX ADMIN — ATORVASTATIN CALCIUM 40 MG: 40 TABLET, FILM COATED ORAL at 17:19

## 2025-05-07 RX ADMIN — CEFAZOLIN SODIUM 2000 MG: 2 SOLUTION INTRAVENOUS at 14:59

## 2025-05-07 NOTE — ASSESSMENT & PLAN NOTE
- Counseled patient on dietary and lifestyle modifications  -Continue atorvastatin 40 mg daily  -Continue to monitor

## 2025-05-07 NOTE — ASSESSMENT & PLAN NOTE
- Counseled patient on dietary and lifestyle modifications   -continue aspirin 81 mg daily and Eliquis 5 mg twice daily  -Continue to monitor

## 2025-05-07 NOTE — ED PROVIDER NOTES
Time reflects when diagnosis was documented in both MDM as applicable and the Disposition within this note       Time User Action Codes Description Comment    5/7/2025 10:29 AM JaChidi chaseaa Add [L97.512] Ulcer of right foot with fat layer exposed (HCC)     5/7/2025 10:29 AM Jabir, Fedaa Add [I73.9] Peripheral arterial disease (HCC)     5/7/2025 11:15 AM Komara Mai Add [I48.0] PAF (paroxysmal atrial fibrillation) (HCC)     5/7/2025 11:15 AM Komara, Mai Add [I10] Essential hypertension     5/7/2025 11:42 AM Jabir, Fedaa Add [A41.9] Sepsis (HCC)     5/7/2025 11:44 AM Jabir Fedaa Add [L08.9] Right foot infection     5/7/2025  2:25 PM Dary Escobar Modify [L97.512] Ulcer of right foot with fat layer exposed (HCC)     5/7/2025  2:25 PM LuzDary pulido Add [A41.9] Sepsis without acute organ dysfunction, due to unspecified organism (HCC)           ED Disposition       ED Disposition   Admit    Condition   Stable    Date/Time   Wed May 7, 2025 11:42 AM    Comment   Case was discussed with admitting SLIM provider and the patient's admission status was agreed to be Admission Status: inpatient status to the service of Dr. Sawant .               Assessment & Plan       Medical Decision Making  Patient is a 79 y.o M with a PMH of PAD, paroxysmal A fib on eliquis, HTN, HLD, DM presenting to the ER complaining of right foot pain for the last week secondary to a nonhealing wound.  Patient reports he is being followed by podiatry and vascular surgery for this wound, reports vascular surgery had him complete a CTA yesterday for pre-operative planning. Patient is tachycardic on arrival, remainder of VS stable. Physical exam revealing 2 cm wound to right metatarsal head/lateral foot with fat layer exposed. Right DP pulse diminished but able to be palpated. Patient has normal work of breathing, lung sounds clear BL.    Will order sepsis workup, EKG, duplex of RLE to rule out DVT. Will order antibiotics/fluids.     White  count 12.54, lactic 2.4, remainder of labs WNL. Duplex US negative for DVT. Low clinical suspicion for PE as patient is not hypoxic/tachypneic, has no chest pain/sob, and dvt study negative.  Discussed case with on-call vascular provider who evaluated patient bedside and recommended no immediate vascular intervention at this time as patient has infection of non-healing right foot wound.  Vascular surgery would like to delay surgical intervention until infection is adequately treated.  Vascular surgery recommended initiating heparin as Eliquis will be held during inpatient stay.  At this point, will admit patient for management of sepsis secondary to right foot wound.  Discussed recommendations for admission with patient who is agreeable with plan.  Patient is stable on admission.    Amount and/or Complexity of Data Reviewed  Labs: ordered.  Radiology: ordered.    Risk  Prescription drug management.  Decision regarding hospitalization.        ED Course as of 05/07/25 1454   Wed May 07, 2025   1009 Duplex study negative for DVT       Medications   heparin (porcine) 25,000 units in 0.45% NaCl 250 mL infusion (premix) (18 Units/kg/hr × 70 kg (Order-Specific) Intravenous New Bag 5/7/25 1154)   heparin (porcine) injection 5,600 Units (has no administration in time range)   heparin (porcine) injection 2,800 Units (has no administration in time range)   sodium chloride 0.9 % bolus 1,000 mL (0 mL Intravenous Stopped 5/7/25 0939)     Followed by   sodium chloride 0.9 % bolus 1,000 mL (1,000 mL Intravenous New Bag 5/7/25 0941)   ceftriaxone (ROCEPHIN) 2 g/50 mL in dextrose IVPB (0 mg Intravenous Stopped 5/7/25 0938)   HYDROmorphone HCl (DILAUDID) injection 0.2 mg (0.2 mg Intravenous Given 5/7/25 0900)   sodium chloride 0.9 % bolus 1,000 mL (0 mL Intravenous Stopped 5/7/25 1157)   heparin (porcine) injection 5,600 Units (5,600 Units Intravenous Given 5/7/25 1153)       ED Risk Strat Scores                    No data  recorded        SBIRT 20yo+      Flowsheet Row Most Recent Value   Initial Alcohol Screen: US AUDIT-C     1. How often do you have a drink containing alcohol? 0 Filed at: 05/07/2025 0830   2. How many drinks containing alcohol do you have on a typical day you are drinking?  0 Filed at: 05/07/2025 0830   3a. Male UNDER 65: How often do you have five or more drinks on one occasion? 0 Filed at: 05/07/2025 0830   3b. FEMALE Any Age, or MALE 65+: How often do you have 4 or more drinks on one occassion? 0 Filed at: 05/07/2025 0830   Audit-C Score 0 Filed at: 05/07/2025 0830   IMTIAZ: How many times in the past year have you...    Used an illegal drug or used a prescription medication for non-medical reasons? Never Filed at: 05/07/2025 0830                            History of Present Illness       Chief Complaint   Patient presents with    Foot Swelling     Pt has a right foot wound that has been treated by wound care and podiatry. Pt reports problem with blood flow        Past Medical History:   Diagnosis Date    Asthma     COVID-19     CVA (cerebral vascular accident) (HCC)     Diabetes mellitus (HCC)     Environmental allergies     Hyperlipidemia     Hypertension     Macular degeneration 07/13/2020    right eye    Orthostatic hypotension     Osteopenia     Pneumonia     Pneumothorax     Sinusitis       Past Surgical History:   Procedure Laterality Date    CARPAL TUNNEL RELEASE Left 08/2024    CATARACT EXTRACTION Left 07/2024    COLONOSCOPY      KNEE CARTILAGE SURGERY Right 1964    VASECTOMY      WISDOM TOOTH EXTRACTION      x4      Family History   Problem Relation Age of Onset    Asthma Mother     Emphysema Mother     Cancer Father     Asthma Brother     Cancer Brother       Social History     Tobacco Use    Smoking status: Former    Smokeless tobacco: Never    Tobacco comments:     quit about 25 years ago   Vaping Use    Vaping status: Never Used   Substance Use Topics    Alcohol use: Never    Drug use: Never       E-Cigarette/Vaping    E-Cigarette Use Never User       E-Cigarette/Vaping Substances    Nicotine No     THC No     CBD No     Flavoring No     Other No     Unknown No       I have reviewed and agree with the history as documented.     Patient is a 79 y.o M with a PMH of PAD, paroxysmal A fib on eliquis, HTN, HLD, DM presenting to the ER complaining of right foot pain for the last week secondary to a nonhealing wound.  Patient reports he is being followed by podiatry and vascular surgery for this wound, reports vascular surgery had him complete a CTA yesterday for pre-operative planning.  Patient reports he has been caring for wound per podiatry instructions but reports pain has been uncontrolled in the last week.  Patient is still ambulatory, reports increased drainage from wound site.  Patient reports he was prescribed a course of doxycycline back in April.  Patient denies chest pain, shortness of breath, fevers, chills, N/V, difficulty ambulating, and decreased sensation/numbness/tingling to affected extremity.           Review of Systems   Constitutional:  Negative for chills and fever.   HENT:  Negative for ear pain and sore throat.    Eyes:  Negative for pain and visual disturbance.   Respiratory:  Negative for cough and shortness of breath.    Cardiovascular:  Negative for chest pain and palpitations.   Gastrointestinal:  Negative for abdominal pain and vomiting.   Genitourinary:  Negative for dysuria and hematuria.   Musculoskeletal:  Positive for joint swelling. Negative for arthralgias and back pain.   Skin:  Positive for wound. Negative for color change and rash.   Neurological:  Negative for seizures and syncope.   All other systems reviewed and are negative.          Objective       ED Triage Vitals [05/07/25 0828]   Temperature Pulse Blood Pressure Respirations SpO2 Patient Position - Orthostatic VS   97.8 °F (36.6 °C) (!) 115 161/80 19 99 % Sitting      Temp Source Heart Rate Source BP Location FiO2  (%) Pain Score    Temporal Monitor Left arm -- 8      Vitals      Date and Time Temp Pulse SpO2 Resp BP Pain Score FACES Pain Rating User   05/07/25 1212 97.5 °F (36.4 °C) 93 94 % -- 157/76 -- -- DII   05/07/25 1200 -- 91 95 % -- 124/70 -- -- EM   05/07/25 1159 98.5 °F (36.9 °C) -- -- -- -- -- -- EM   05/07/25 1145 -- 94 94 % 18 124/60 -- -- EM   05/07/25 1100 -- 98 93 % 20 136/73 -- -- [facility]   05/07/25 1045 -- 96 95 % 19 140/77 -- -- EM   05/07/25 1000 -- 88 97 % 16 130/58 -- -- [facility]   05/07/25 0945 -- 93 98 % 17 152/74 -- -- [facility]   05/07/25 0930 -- -- -- -- -- 4 -- [facility]   05/07/25 0915 -- 94 96 % 16 150/73 -- -- [facility]   05/07/25 0900 -- 102 96 % 20 151/70 8 -- [facility]   05/07/25 0829 -- -- 99 % -- -- -- -- [facility]   05/07/25 0828 97.8 °F (36.6 °C) 115 99 % 19 161/80 8 -- [facility]            Physical Exam  Vitals and nursing note reviewed.   Constitutional:       General: He is not in acute distress.     Appearance: Normal appearance. He is well-developed. He is not ill-appearing.   HENT:      Head: Normocephalic and atraumatic.   Eyes:      Conjunctiva/sclera: Conjunctivae normal.   Cardiovascular:      Rate and Rhythm: Regular rhythm. Tachycardia present.      Pulses: Normal pulses.      Heart sounds: Normal heart sounds. No murmur heard.     No friction rub. No gallop.   Pulmonary:      Effort: Pulmonary effort is normal. No respiratory distress.      Breath sounds: Normal breath sounds. No stridor. No wheezing, rhonchi or rales.   Abdominal:      General: Abdomen is flat.      Palpations: Abdomen is soft.      Tenderness: There is no abdominal tenderness.   Musculoskeletal:      Cervical back: Neck supple.      Right ankle: Normal. No swelling. No tenderness. Normal range of motion.      Right foot: Normal range of motion. Swelling and tenderness present. Abnormal pulse.        Feet:       Comments: 1 cm wound noted to right 5th metatarsal head/lateral foot with fat layer exposed. Surrounding erythema/swelling noted. DP pulse  diminished.   Skin:     General: Skin is warm and dry.      Capillary Refill: Capillary refill takes less than 2 seconds.   Neurological:      Mental Status: He is alert.   Psychiatric:         Mood and Affect: Mood normal.         Behavior: Behavior normal.         Thought Content: Thought content normal.         Judgment: Judgment normal.         Results Reviewed       Procedure Component Value Units Date/Time    Lactic acid 2 Hours [[id number]]  (Normal) Collected: 05/07/25 1242    Lab Status: Final result Specimen: Blood from Arm, Left Updated: 05/07/25 1307     LACTIC ACID 1.5 mmol/L     Narrative:      Result may be elevated if tourniquet was used during collection.    Urine Microscopic [[id number]]  (Normal) Collected: 05/07/25 1144    Lab Status: Final result Specimen: Urine, Clean Catch Updated: 05/07/25 1249     RBC, UA 1-2 /hpf      WBC, UA 0-1 /hpf      Epithelial Cells Occasional /hpf      Bacteria, UA None Seen /hpf     UA w Reflex to Microscopic w Reflex to Culture [[id number]]  (Abnormal) Collected: 05/07/25 1144    Lab Status: Final result Specimen: Urine, Clean Catch Updated: 05/07/25 1209     Color, UA Yellow     Clarity, UA Clear     Specific Gravity, UA 1.010     pH, UA 6.0     Leukocytes, UA Negative     Nitrite, UA Negative     Protein, UA Trace mg/dl      Glucose, UA Negative mg/dl      Ketones, UA Negative mg/dl      Urobilinogen, UA 0.2 E.U./dl      Bilirubin, UA Negative     Occult Blood, UA 1+    Procalcitonin [[id number]]  (Normal) Collected: 05/07/25 0900    Lab Status: Final result Specimen: Blood from Arm, Right Updated: 05/07/25 0942     Procalcitonin 0.08 ng/ml     Comprehensive metabolic panel [[id number]]  (Abnormal) Collected: 05/07/25 0900    Lab Status: Final result Specimen: Blood from Arm, Right Updated: 05/07/25 0935     Sodium 137 mmol/L      Potassium 3.9 mmol/L      Chloride 102 mmol/L      CO2 25 mmol/L      ANION GAP 10 mmol/L      BUN 17 mg/dL      Creatinine 0.95 mg/dL       Glucose 155 mg/dL      Calcium 9.4 mg/dL      AST 9 U/L      ALT 10 U/L      Alkaline Phosphatase 73 U/L      Total Protein 7.1 g/dL      Albumin 3.7 g/dL      Total Bilirubin 0.31 mg/dL      eGFR 75 ml/min/1.73sq m     Narrative:      [employer] guidelines for Chronic Kidney Disease (CKD):     Stage 1 with normal or high GFR (GFR > 90 mL/min/1.73 square meters)    Stage 2 Mild CKD (GFR = 60-89 mL/min/1.73 square meters)    Stage 3A Moderate CKD (GFR = 45-59 mL/min/1.73 square meters)    Stage 3B Moderate CKD (GFR = 30-44 mL/min/1.73 square meters)    Stage 4 Severe CKD (GFR = 15-29 mL/min/1.73 square meters)    Stage 5 End Stage CKD (GFR <15 mL/min/1.73 square meters)  Note: GFR calculation is accurate only with a steady state creatinine    Protime-INR [[id number]]  (Abnormal) Collected: 05/07/25 0900    Lab Status: Final result Specimen: Blood from Arm, Right Updated: 05/07/25 0935     Protime 15.1 seconds      INR 1.14    Narrative:      INR Therapeutic Range    Indication                                             INR Range      Atrial Fibrillation                                               2.0-3.0  Hypercoagulable State                                    2.0.2.3  Left Ventricular Asist Device                            2.0-3.0  Mechanical Heart Valve                                  -    Aortic(with afib, MI, embolism, HF, LA enlargement,    and/or coagulopathy)                                     2.0-3.0 (2.5-3.5)     Mitral                                                             2.5-3.5  Prosthetic/Bioprosthetic Heart Valve               2.0-3.0  Venous thromboembolism (VTE: VT, PE        2.0-3.0    APTT [[id number]]  (Normal) Collected: 05/07/25 0900    Lab Status: Final result Specimen: Blood from Arm, Right Updated: 05/07/25 0935     PTT 31 seconds     Lactic acid [[id number]]  (Abnormal) Collected: 05/07/25 0900    Lab Status: Final result Specimen: Blood from Arm, Right  Updated: 05/07/25 0933     LACTIC ACID 2.4 mmol/L     Narrative:      Result may be elevated if tourniquet was used during collection.    CBC and differential [[id number]]  (Abnormal) Collected: 05/07/25 0900    Lab Status: Final result Specimen: Blood from Arm, Right Updated: 05/07/25 0913     WBC 12.54 Thousand/uL      RBC 3.93 Million/uL      Hemoglobin 11.5 g/dL      Hematocrit 36.0 %      MCV 92 fL      MCH 29.3 pg      MCHC 31.9 g/dL      RDW 13.3 %      MPV 9.2 fL      Platelets 310 Thousands/uL      nRBC 0 /100 WBCs      Segmented % 75 %      Immature Grans % 1 %      Lymphocytes % 14 %      Monocytes % 8 %      Eosinophils Relative 2 %      Basophils Relative 0 %      Absolute Neutrophils 9.43 Thousands/µL      Absolute Immature Grans 0.13 Thousand/uL      Absolute Lymphocytes 1.80 Thousands/µL      Absolute Monocytes 0.94 Thousand/µL      Eosinophils Absolute 0.19 Thousand/µL      Basophils Absolute 0.05 Thousands/µL     Blood culture #2 [[id number]] Collected: 05/07/25 0900    Lab Status: In process Specimen: Blood from Arm, Right Updated: 05/07/25 0910    Blood culture #1 [[id number]] Collected: 05/07/25 0900    Lab Status: In process Specimen: Blood from Arm, Left Updated: 05/07/25 0910            VAS VEIN MAPPING- LOWER EXTREMITY   Final Interpretation by Jose G Galicia DO (05/07 1447)      XR foot 3+ views RIGHT   Final Interpretation by Campbell Castaneda MD (05/07 1254)      Ulceration along the lateral aspect of the foot and soft tissue swelling along the dorsum of the foot without radiographic findings of osteomyelitis.         Computerized Assisted Algorithm (CAA) may have been used to analyze all applicable images.         Workstation performed: [id number]         VAS VENOUS DUPLEX -LOWER LIMB UNILATERAL   Final Interpretation by Sid Andrade MD (05/07 6207)          Procedures    ED Medication and Procedure Management   Prior to Admission Medications   Prescriptions Last Dose  Informant Patient Reported? Taking?   Budeson-Glycopyrrol-Formoterol (Breztri Aerosphere) 160-9-4.8 MCG/ACT AERO 2025 Morning Self Yes Yes   Sig: Take 2 puffs by mouth Every 12 hours   LORazepam (ATIVAN) 0.5 mg tablet  Self Yes Yes   Sig: Take 0.5 mg by mouth if needed   Multiple Vitamins-Minerals (Multivitamin Adults) TABS 2025 Morning Self Yes Yes   Sig: Take 1 tablet by mouth daily   OneTouch Ultra test strip Unknown Self Yes No   albuterol (ACCUNEB) 1.25 MG/3ML nebulizer solution  Self Yes Yes   Sig: Take 1.25 mg by nebulization every 6 (six) hours as needed for wheezing   albuterol (PROVENTIL HFA,VENTOLIN HFA) 90 mcg/act inhaler  Self Yes Yes   Si puff if needed   amLODIPine (NORVASC) 5 mg tablet 2025 Morning Self Yes Yes   Sig: Take 5 mg by mouth 2 (two) times a day   apixaban (ELIQUIS) 5 mg 2025 Evening Self Yes Yes   Si mg 2 (two) times a day   aspirin 81 mg chewable tablet 2025 Morning Self No Yes   Sig: Chew 1 tablet (81 mg total) daily   atenolol (TENORMIN) 25 mg tablet 2025 Morning Self Yes Yes   Sig: Take 25 mg by mouth daily   atorvastatin (LIPITOR) 40 mg tablet 2025 Evening Self No Yes   Sig: Take 1 tablet (40 mg total) by mouth every evening   collagenase (SANTYL) ointment Not Taking  No No   Sig: Apply topically daily To wounds of R lateral foot and heel   Patient not taking: Reported on 2025   famotidine (PEPCID) 20 mg tablet 2025 Morning Self No Yes   Sig: Take 1 tablet (20 mg total) by mouth 2 (two) times a day   glipiZIDE (GLUCOTROL XL) 10 mg 24 hr tablet 2025 Morning Self Yes Yes   Sig: Take 10 mg by mouth 2 (two) times a day   guaiFENesin (MUCINEX) 600 mg 12 hr tablet Not Taking Self No No   Sig: Take 1 tablet (600 mg total) by mouth 2 (two) times a day   Patient not taking: Reported on 2025   linaGLIPtin (Tradjenta) 5 MG TABS 2025 Evening Self Yes Yes   Sig: Take 5 mg by mouth daily with dinner   lisinopril (ZESTRIL) 20 mg tablet  5/7/2025 Morning Self Yes Yes   Sig: Take 20 mg by mouth 2 (two) times a day   metFORMIN (GLUCOPHAGE-XR) 500 mg 24 hr tablet 5/6/2025 Evening Self Yes Yes   Sig: Take 500 mg by mouth daily with dinner   montelukast (SINGULAIR) 10 mg tablet 5/6/2025 Bedtime Self No Yes   Sig: Take 1 tablet (10 mg total) by mouth daily at bedtime   predniSONE 5 mg tablet  Self No No   Sig: Take 1 tablet (5 mg total) by mouth every other day   Patient taking differently: Take 5 mg by mouth daily      Facility-Administered Medications: None     Current Discharge Medication List        CONTINUE these medications which have NOT CHANGED    Details   albuterol (ACCUNEB) 1.25 MG/3ML nebulizer solution Take 1.25 mg by nebulization every 6 (six) hours as needed for wheezing      albuterol (PROVENTIL HFA,VENTOLIN HFA) 90 mcg/act inhaler 1 puff if needed      amLODIPine (NORVASC) 5 mg tablet Take 5 mg by mouth 2 (two) times a day      apixaban (ELIQUIS) 5 mg 5 mg 2 (two) times a day      aspirin 81 mg chewable tablet Chew 1 tablet (81 mg total) daily    Associated Diagnoses: Cerebrovascular accident (CVA), unspecified mechanism (HCC)      atenolol (TENORMIN) 25 mg tablet Take 25 mg by mouth daily      atorvastatin (LIPITOR) 40 mg tablet Take 1 tablet (40 mg total) by mouth every evening  Qty: 30 tablet, Refills: 0    Associated Diagnoses: Cerebrovascular accident (CVA), unspecified mechanism (HCC)      Budeson-Glycopyrrol-Formoterol (Breztri Aerosphere) 160-9-4.8 MCG/ACT AERO Take 2 puffs by mouth Every 12 hours      famotidine (PEPCID) 20 mg tablet Take 1 tablet (20 mg total) by mouth 2 (two) times a day    Associated Diagnoses: GERD (gastroesophageal reflux disease)      glipiZIDE (GLUCOTROL XL) 10 mg 24 hr tablet Take 10 mg by mouth 2 (two) times a day      linaGLIPtin (Tradjenta) 5 MG TABS Take 5 mg by mouth daily with dinner      lisinopril (ZESTRIL) 20 mg tablet Take 20 mg by mouth 2 (two) times a day      LORazepam (ATIVAN) 0.5 mg tablet  Take 0.5 mg by mouth if needed      metFORMIN (GLUCOPHAGE-XR) 500 mg 24 hr tablet Take 500 mg by mouth daily with dinner      montelukast (SINGULAIR) 10 mg tablet Take 1 tablet (10 mg total) by mouth daily at bedtime    Associated Diagnoses: Asthma      Multiple Vitamins-Minerals (Multivitamin Adults) TABS Take 1 tablet by mouth daily      collagenase (SANTYL) ointment Apply topically daily To wounds of R lateral foot and heel  Qty: 30 g, Refills: 1    Comments: Quantity sufficient for one month. Lateral foot wound measures 1.2 cm x 1.1 cm. Heel wound measures 0.5 cm x 0.2 cm. Please call pt regarding the price of the medication.  Associated Diagnoses: Diabetic ulcer of right midfoot associated with type 2 diabetes mellitus, with fat layer exposed (HCC); Diabetic ulcer of right heel associated with type 2 diabetes mellitus, with fat layer exposed (HCC)      guaiFENesin (MUCINEX) 600 mg 12 hr tablet Take 1 tablet (600 mg total) by mouth 2 (two) times a day    Associated Diagnoses: Sepsis due to pneumonia ([facility])      OneTouch Ultra test strip       predniSONE 5 mg tablet Take 1 tablet (5 mg total) by mouth every other day    Associated Diagnoses: Generalized pruritus           No discharge procedures on file.  ED SEPSIS DOCUMENTATION   Time reflects when diagnosis was documented in both MDM as applicable and the Disposition within this note       Time User Action Codes Description Comment    5/7/2025 10:29 AM Roxana Tapia Add [L97.512] Ulcer of right foot with fat layer exposed (HCC)     5/7/2025 10:29 AM Roxana Tapia Add [I73.9] Peripheral arterial disease (HCC)     5/7/2025 11:15 AM Mai Burden Add [I48.0] PAF (paroxysmal atrial fibrillation) (HCC)     5/7/2025 11:15 AM Komara Mai Add [I10] Essential hypertension     5/7/2025 11:42 AM Roxana Tapia Add [A41.9] Sepsis (HCC)     5/7/2025 11:44 AM Roxana Tapia Add [L08.9] Right foot infection     5/7/2025  2:25 PM Dary Escobar Modify [L97.512] Ulcer of right  foot with fat layer exposed (HCC)     5/7/2025  2:25 PM Dary Escobar Add [A41.9] Sepsis without acute organ dysfunction, due to unspecified organism (HCC)                  Roxana Tapia PA-C  05/07/25 1454

## 2025-05-07 NOTE — PLAN OF CARE
Problem: Potential for Falls  Goal: Patient will remain free of falls  Description: INTERVENTIONS:- Educate patient/family on patient safety including physical limitations- Instruct patient to call for assistance with activity - Consult OT/PT to assist with strengthening/mobility - Keep Call bell within reach- Keep bed low and locked with side rails adjusted as appropriate- Keep care items and personal belongings within reach- Initiate and maintain comfort rounds- Make Fall Risk Sign visible to staff- Offer Toileting every 2 Hours, in advance of need- Initiate/Maintain bed alarm- Obtain necessary fall risk management equipment: non skid socks- Apply yellow socks and bracelet for high fall risk patients- Consider moving patient to room near nurses station  INTERVENTIONS:- Educate patient/family on patient safety including physical limitations- Instruct patient to call for assistance with activity - Consult OT/PT to assist with strengthening/mobility - Keep Call bell within reach- Keep bed low and locked with side rails adjusted as appropriate- Keep care items and personal belongings within reach- Initiate and maintain comfort rounds-

## 2025-05-07 NOTE — ASSESSMENT & PLAN NOTE
Home regimen amlodipine 5 mg daily, atenolol 25 mg daily, and lisinopril 20 mg twice daily  Continue with current regimen at this time  Holding parameter, SBP less than 130 in the setting of sepsis  Continue to monitor blood pressure closely and adjust medication as indicated

## 2025-05-07 NOTE — ASSESSMENT & PLAN NOTE
- Patient has blood pressures at home are well-controlled  -Continue amlodipine 5 mg twice daily, atenolol 25 mg daily, lisinopril 20 mg twice daily  - Continue to monitor  - Counseled patient on dietary and lifestyle modifications.

## 2025-05-07 NOTE — CONSULTS
"Consultation - Surgery-General   Name: Gold Van 79 y.o. male I MRN: [id number]  Unit/Bed#: ED 25 I Date of Admission: 5/7/2025   Date of Service: 5/7/2025 I Hospital Day: 0   Inpatient consult to Vascular Surgery  Consult performed by: Mai Burden PA-C  Consult ordered by: Roxana Tapia PA-C        Physician Requesting Evaluation: Davonte Taylor DO   Reason for Evaluation / Principal Problem: nonhealing right foot wound    Assessment & Plan  Ulcer of right foot with fat layer exposed (HCC)  78yo male PMH afib on eliquis, stroke 9/2024, HTN, DM, HLD presents with infected right foot wound. Vascular consulted to optimized blood flow.    -LEADs 4/18: \"RIGHT LOWER LIMB:  There is a >75% stenosis noted in the proximal superficial femoral artery and a  50-75% stenosis in the distal superficial femoral artery. Occlusion vs high  grade stenosis noted in the distal posterior tibial artery. Diffuse disease with  calcification and heavy shadowing throughout the remaining femoro-popliteal and  tibio-peroneal segments may obscure more significant stenosis.  Ankle/Brachial index: 0.42 which is in the ischemic disease category (Prior  0.83)  PVR/ PPG tracings are dampened.  Metatarsal pressure of  15 mmHg  Great toe pressure of  16 mmHg, below the healing range     LEFT LOWER LIMB:  There is occlusion vs high grade stenosis in the posterior tibial and anterior  tibial arteries. Diffuse disease with calcification and heavy shadowing  throughout the remaining femoro-popliteal and tibio-peroneal segments may  obscure more significant stenosis.  Ankle/Brachial index:   1.06 which is in the normal category (Prior 1.03)  PVR/ PPG tracings are dampened.  Metatarsal pressure of  85 mmHg  Great toe pressure of  45 mmHg, below the healing range for a diabetic patient     Compared to previous study on 7/3/2023 , there is new stenosis and occlusion on  the right. New occlusion of the left. Significant decrease of right " "RIC.  Bilateral toe pressure now below healing range.\"   -CTA 5/6 final read pending   -right foot wound on the lateral aspect of the foot, sensation/motor intact; nonpalpable R PT, 1+ DP, 2+ femoral   -afebrile, tachycardic   -WBC 12.5   -Hgb stable   -Cr wnl   -lactic acid 2.4    Plan:  -admit to [facility]; patient will need transfer to another facility in the coming days for femoral endartectomy once infection is under control  -timing TBD  -okay for regular diet  -podiatry and cardiology consults, appreciate recs  -will order vein mapping  -IV abx, IVF  -local wound care to right foot per podiatry  -hold eliquis and start heparin drip  -discussed with on call vascular surgeon Dr Aiken; final recs pending attending attestation      History of Present Illness   Gold Van is a 79 y.o. male who presents with nonhealing right foot wound. Patient states he has been dealing with the foot wound for about 6 weeks. In the last week he has noticed more redness, swelling and pain in the right foot. He did see podiatry two weeks ago. He spoke with the podiatry office yesterday and was told to come into the hospital based on his symptoms. He has recently seen vascular surgery and Dr Allison Doctor was leaning towards the patient needing a right femoral endarterectomy soon. He did get a CTA yesterday in anticipation of surgery. Patient does take eliquis. Former smoker.     Review of Systems   Constitutional: Negative.    HENT: Negative.     Respiratory: Negative.     Cardiovascular: Negative.    Gastrointestinal: Negative.    Genitourinary: Negative.    Musculoskeletal: Negative.    Skin:  Positive for wound.   Neurological: Negative.    Hematological: Negative.    Psychiatric/Behavioral: Negative.       Medical History Review: I have reviewed the patient's PMH, PSH, Social History, Family History, Meds, and Allergies     Objective :  Temp:  [97.8 °F (36.6 °C)] 97.8 °F (36.6 °C)  HR:  [] 98  BP: (130-161)/(58-80) " 136/73  Resp:  [16-20] 20  SpO2:  [93 %-99 %] 93 %  O2 Device: None (Room air)      Physical Exam  Constitutional:       Appearance: Normal appearance.   HENT:      Head: Normocephalic and atraumatic.      Nose: Nose normal.      Mouth/Throat:      Mouth: Mucous membranes are moist.      Pharynx: Oropharynx is clear.   Eyes:      Conjunctiva/sclera: Conjunctivae normal.      Pupils: Pupils are equal, round, and reactive to light.   Cardiovascular:      Rate and Rhythm: Tachycardia present.   Pulmonary:      Effort: Pulmonary effort is normal.   Abdominal:      General: Abdomen is flat.   Musculoskeletal:         General: Normal range of motion.      Right lower leg: Edema present.      Left lower leg: Edema present.      Comments: Right foot wound with surrounding cellulitis on the lateral aspect of the foot.    Skin:     General: Skin is warm.   Neurological:      General: No focal deficit present.      Mental Status: He is alert.   Psychiatric:         Mood and Affect: Mood normal.         Lab Results: I have reviewed the following results:  Recent Labs     05/07/25  0900   WBC 12.54*   HGB 11.5*   HCT 36.0*      SODIUM 137   K 3.9      CO2 25   BUN 17   CREATININE 0.95   GLUC 155*   AST 9*   ALT 10   ALB 3.7   TBILI 0.31   ALKPHOS 73   PTT 31   INR 1.14   LACTICACID 2.4*         VTE Pharmacologic Prophylaxis: Heparin  VTE Mechanical Prophylaxis: sequential compression device    Mai Burden PA-C

## 2025-05-07 NOTE — CONSULTS
[facility] Podiatry - Consultation    Patient Information:   Gold Van 79 y.o. male MRN: [id number]  Unit/Bed#: -01 Encounter: [id number]  PCP: Shayne Diaz MD  Date of Admission:  5/7/2025  Date of Consultation: 05/07/25  Requesting Physician: Cora Sawant, *      ASSESSMENT:    Gold Van is a 79 y.o. male with:    Peripheral arterial disease  Osteomyelitis of the right foot  Cellulitis of the right foot    PLAN:    Orders written for Adaptic, DSD  Continue IV antibiotics per internal medicine, patient is well-known to me from outpatient basis and he will need partial fifth ray following revascularization of the right lower extremity, previous MRI is sufficient for imaging and they will be at high potential for need for long-term antibiotics  Rest of care per primary team.      SUBJECTIVE    History of Present Illness:    Gold Van is a 79 y.o. male with past medical history of osteomyelitis, peripheral arterial disease who presents with worsening infection to the right lower extremity, worsening pain, worsening erythema.     Review of Systems:    Constitutional: Negative.    HENT: Negative.    Eyes: Negative.    Respiratory: Negative.    Cardiovascular: Negative.    Gastrointestinal: Negative.    Musculoskeletal: neg   Skin:as above   Neurological: neg   Psych: Negative.     Past Medical and Surgical History:     Past Medical History:   Diagnosis Date    Asthma     COVID-19     CVA (cerebral vascular accident) (HCC)     Diabetes mellitus (HCC)     Environmental allergies     Hyperlipidemia     Hypertension     Macular degeneration 07/13/2020    right eye    Orthostatic hypotension     Osteopenia     Pneumonia     Pneumothorax     Sinusitis        Past Surgical History:   Procedure Laterality Date    CARPAL TUNNEL RELEASE Left 08/2024    CATARACT EXTRACTION Left 07/2024    COLONOSCOPY      KNEE CARTILAGE SURGERY Right 1964    VASECTOMY      WISDOM TOOTH EXTRACTION      x4        Meds/Allergies:      Medications Prior to Admission:     albuterol (ACCUNEB) 1.25 MG/3ML nebulizer solution    albuterol (PROVENTIL HFA,VENTOLIN HFA) 90 mcg/act inhaler    amLODIPine (NORVASC) 5 mg tablet    apixaban (ELIQUIS) 5 mg    aspirin 81 mg chewable tablet    atenolol (TENORMIN) 25 mg tablet    atorvastatin (LIPITOR) 40 mg tablet    Budeson-Glycopyrrol-Formoterol (Breztri Aerosphere) 160-9-4.8 MCG/ACT AERO    collagenase (SANTYL) ointment    famotidine (PEPCID) 20 mg tablet    glipiZIDE (GLUCOTROL XL) 2.5 mg 24 hr tablet    guaiFENesin (MUCINEX) 600 mg 12 hr tablet    linaGLIPtin (Tradjenta) 5 MG TABS    lisinopril (ZESTRIL) 20 mg tablet    LORazepam (ATIVAN) 0.5 mg tablet    metFORMIN (GLUCOPHAGE-XR) 750 mg 24 hr tablet    montelukast (SINGULAIR) 10 mg tablet    Multiple Vitamins-Minerals (Multivitamin Adults) TABS    OneTouch Ultra test strip    predniSONE 5 mg tablet    Allergies   Allergen Reactions    Ciprofloxacin Shortness Of Breath    Sulfamethoxazole Shortness Of Breath    Trimethoprim Shortness Of Breath    Dexamethasone Other (See Comments)    Triamcinolone Other (See Comments)    Bactrim [Sulfamethoxazole-Trimethoprim] Fever     Fever of 107       Social History:     Marital Status: /Civil Union    Substance Use History:   Social History     Substance and Sexual Activity   Alcohol Use Never     Social History     Tobacco Use   Smoking Status Former   Smokeless Tobacco Never   Tobacco Comments    quit about 25 years ago     Social History     Substance and Sexual Activity   Drug Use Never       Family History:    Family History   Problem Relation Age of Onset    Asthma Mother     Emphysema Mother     Cancer Father     Asthma Brother     Cancer Brother          OBJECTIVE:    Vitals:   Blood Pressure: 157/76 (05/07/25 1212)  Pulse: 93 (05/07/25 1212)  Temperature: 97.5 °F (36.4 °C) (05/07/25 1212)  Temp Source: Temporal (05/07/25 1159)  Respirations: 18 (05/07/25 1145)  Height: 6'  "(182.9 cm) (05/07/25 0828)  Weight - Scale: 73.9 kg (162 lb 14.7 oz) (05/07/25 0828)  SpO2: 94 % (05/07/25 1212)    Physical Exam:     General Appearance: Alert, cooperative, no distress.  HEENT: Head normocephalic, atraumatic, without obvious abnormality.  Heart: Normal rate and rhythm.  Lungs: Non-labored breathing. No respiratory distress.  Abdomen: Without distension.  Psychiatric: AAOx3    There is substantial erythema to the right foot and soft tissue swelling, there is a punctate shaped ulceration right lateral fifth MTPJ, squishy tissue in the base of the wound.      Additional Data:     Lab Results: I have personally reviewed pertinent labs including:    Results from last 7 days   Lab Units 05/07/25  0900   WBC Thousand/uL 12.54*   HEMOGLOBIN g/dL 11.5*   HEMATOCRIT % 36.0*   PLATELETS Thousands/uL 310   SEGS PCT % 75   LYMPHO PCT % 14   MONO PCT % 8   EOS PCT % 2     Results from last 7 days   Lab Units 05/07/25  0900   POTASSIUM mmol/L 3.9   CHLORIDE mmol/L 102   CO2 mmol/L 25   BUN mg/dL 17   CREATININE mg/dL 0.95   CALCIUM mg/dL 9.4   ALK PHOS U/L 73   ALT U/L 10   AST U/L 9*     Results from last 7 days   Lab Units 05/07/25  0900   INR  1.14       Cultures: I have personally reviewed pertinent cultures including:              Imaging: I have personally reviewed pertinent films in PACS.  EKG, Pathology, and Other Studies: I have personally reviewed pertinent reports.        ** Please Note: Portions of the record may have been created with voice recognition software. Occasional wrong word or \"sound a like\" substitutions may have occurred due to the inherent limitations of voice recognition software. Read the chart carefully and recognize, using context, where substitutions have occurred. **    "

## 2025-05-07 NOTE — ASSESSMENT & PLAN NOTE
"LEADs 4/18-   Right-50 to 75% stenosis in the distal superficial femoral artery.  Occlusion versus high-grade stenosis in the distal posterior tibial artery.  Great toe pressure is below the healing range  Left-there is occlusive versus high-grade stenosis in the posterior tibial and anterior tibial arteries. Great toe pressure is below the healing range for diabetic patient.  Compared to 7/3/2023-continue stenosis and occlusion on the right.  New occlusion of the left.  Significant decrease of right RIC.  CTA (5/6) \"Focal high-grade stenosis of distal right CFA and diffuse atherosclerotic disease of right SFA resulting in moderate to severe stenoses with focal near complete occlusions at the proximal and distal segments. Short segment occlusions of proximal right anterior tibial and peroneal arteries with reconstitution in the medial to distal segments. Right posterior tibial artery is occluded. Occluded left SFA with reconstitution in the distal segment. Left anterior tibial and posterior tibial arteries are occluded. One-vessel runoff of the left lower extremity through the peroneal artery. Focal high-grade stenosis at the proximal right renal artery.\"  Vascular surgery input appreciated   The patient will required a femoral endarterectomy once infection is better controlled.  Follow-up with vein mapping  Continue statin, aspirin, heparin infusion  "

## 2025-05-07 NOTE — TELEPHONE ENCOUNTER
Call returned to the patient regarding concerns with increased pain to the wound site.  Per Dr. Galeas if showing signs and symptoms of infection such as the increased pain, patient should go to the ER for evaluation.  Spoke with both the patient and wife at the same time.  Patient and wife would like to discuss amongst themselves prior to going to the ER.

## 2025-05-07 NOTE — H&P
"H&P - Hospitalist   Name: Gold Van 79 y.o. male I MRN: [id number]  Unit/Bed#: -01 I Date of Admission: 5/7/2025   Date of Service: 5/7/2025 I Hospital Day: 0     Assessment & Plan  Sepsis without acute organ dysfunction (HCC)  The patient presented with increasing pain on the right foot with history of severe peripheral vascular disease and DM ulcer wound on the right foot   The patient meets sepsis criteria with tachycardia and leukocytosis with underlying right chronic diabetic ulcer wound with cellulitis and osteomyelitis  Podiatry and vascular surgery input appreciated  Will require vascular intervention- a femoral endarterectomy prior to podiatric surgery. Vascular would like the patient to receive IV antibiotics get a better control of the infection prior to the procedure. He will need a transfer to [facility], date TBD.    Started on cefazolin and Flagyl  Follow-up with cultures  Monitor fever trends/WBC   Severe peripheral arterial disease (HCC)  LEADs 4/18-   Right-50 to 75% stenosis in the distal superficial femoral artery.  Occlusion versus high-grade stenosis in the distal posterior tibial artery.  Great toe pressure is below the healing range  Left-there is occlusive versus high-grade stenosis in the posterior tibial and anterior tibial arteries. Great toe pressure is below the healing range for diabetic patient.  Compared to 7/3/2023-continue stenosis and occlusion on the right.  New occlusion of the left.  Significant decrease of right RIC.  CTA (5/6) \"Focal high-grade stenosis of distal right CFA and diffuse atherosclerotic disease of right SFA resulting in moderate to severe stenoses with focal near complete occlusions at the proximal and distal segments. Short segment occlusions of proximal right anterior tibial and peroneal arteries with reconstitution in the medial to distal segments. Right posterior tibial artery is occluded. Occluded left SFA with reconstitution in the distal " "segment. Left anterior tibial and posterior tibial arteries are occluded. One-vessel runoff of the left lower extremity through the peroneal artery. Focal high-grade stenosis at the proximal right renal artery.\"  Vascular surgery input appreciated   The patient will required a femoral endarterectomy once infection is better controlled.  Follow-up with vein mapping  Continue statin, aspirin, heparin infusion  Type 2 diabetes mellitus with complication, without long-term current use of insulin (HCC)  Lab Results   Component Value Date    HGBA1C 9.3 (H) 04/09/2025       No results for input(s): \"POCGLU\" in the last 72 hours.    Blood Sugar Average: Last 72 hrs:  Hold home regimen including glipizide, linagliptin and metformin  Placed on SSI  May need a long-acting insulin in the setting of chronic steroid use  Restricted carbohydrate diet  Hypoglycemia protocol    Essential hypertension  Home regimen amlodipine 5 mg daily, atenolol 25 mg daily, and lisinopril 20 mg twice daily  Continue with current regimen at this time  Holding parameter, SBP less than 130 in the setting of sepsis  Continue to monitor blood pressure closely and adjust medication as indicated  Mild intermittent asthma without complication  In no acute exacerbation  Continue with Breztri and albuterol as needed  Pruritus  Chronic pruritus  Has been maintained on prednisone 5 mg daily  Will continue   CVA (cerebrovascular accident) (HCC)  History of CVA 9/2024  Continue stroke prevention with statin, aspirin  Hold Eliquis for now  Ulcer of right foot with fat layer exposed (HCC)  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   Podiatry input appreciated-will likely need a " partial fifth ray amputation following revascularization   Will start on cefazolin and Flagyl  Continue with local wound care     PAF (paroxysmal atrial fibrillation) ([facility])  Currently in sinus rhythm  Urology input appreciated  Continue atenolol 25 mg daily for rate control  Hold Eliquis for now, currently on heparin infusion   Cardiomyopathy (HCC)  Primary cardiologist- Dr. Ho  LVEF of 50% on TTE 10/2024  Cardiology input appreciated-patient is appropriate risk to proceed with surgery  Continue medical management   Currently not on diuretic therapy   Monitor volume status close, cautious with IV fluids       VTE Pharmacologic Prophylaxis: VTE Score: 5 High Risk (Score >/= 5) - Pharmacological DVT Prophylaxis Ordered: heparin drip. Sequential Compression Devices Ordered.  Code Status: Level 1 - Full Code   Discussion with family: Updated  (wife) at bedside.    Anticipated Length of Stay: Patient will be admitted on an inpatient basis with an anticipated length of stay of greater than 2 midnights secondary to sepsis secondary to right foot cellulitis and possible early development of osteomyelitis.    History of Present Illness   Chief Complaint: right foot pain     Gold Van is a 79 y.o. male with a PMH of CVA in 9/2024, cardiomyopathy with EF of 50%, hypertension, hyperlipidemia, atrial fibrillation on Eliquis chronic pruritus, diabetes, severe peripheral vascular disease, and chronic diabetic foot wound who presents with worsening pain of the right foot.  The patient was seen by his primary podiatrist-Dr. Galeas and was recommended to come into the ED due to worsening pain, erythema of the right foot.  The patient denies any fevers or chills.  He denies any nausea, vomiting, abdominal pain, or urinary symptoms.  In the ED, the patient found to meet sepsis criteria and received IV fluids per sepsis protocol and ceftriaxone.      Review of Systems   Constitutional:  Negative for chills,  fatigue and fever.   HENT:  Negative for congestion, ear pain, postnasal drip and sore throat.    Eyes:  Negative for discharge, itching and visual disturbance.   Respiratory:  Negative for cough, chest tightness and shortness of breath.    Cardiovascular:  Negative for chest pain, palpitations and leg swelling.   Gastrointestinal:  Negative for abdominal pain, nausea and vomiting.   Endocrine: Negative for cold intolerance and heat intolerance.   Genitourinary:  Negative for difficulty urinating, dysuria and hematuria.   Musculoskeletal:  Negative for back pain, gait problem and neck pain.   Skin:  Positive for wound (chronic wound on right foot). Negative for rash.        Increasing pain on the right foot    Neurological:  Negative for dizziness, speech difficulty, weakness, light-headedness and headaches.   Psychiatric/Behavioral:  Negative for confusion, decreased concentration and dysphoric mood.        Historical Information   Past Medical History:   Diagnosis Date    Asthma     COVID-19     CVA (cerebral vascular accident) (HCC)     Diabetes mellitus (HCC)     Environmental allergies     Hyperlipidemia     Hypertension     Macular degeneration 07/13/2020    right eye    Orthostatic hypotension     Osteopenia     Pneumonia     Pneumothorax     Sinusitis      Past Surgical History:   Procedure Laterality Date    CARPAL TUNNEL RELEASE Left 08/2024    CATARACT EXTRACTION Left 07/2024    COLONOSCOPY      KNEE CARTILAGE SURGERY Right 1964    VASECTOMY      WISDOM TOOTH EXTRACTION      x4     Social History     Tobacco Use    Smoking status: Former    Smokeless tobacco: Never    Tobacco comments:     quit about 25 years ago   Vaping Use    Vaping status: Never Used   Substance and Sexual Activity    Alcohol use: Never    Drug use: Never    Sexual activity: Yes     Partners: Female     E-Cigarette/Vaping    E-Cigarette Use Never User      E-Cigarette/Vaping Substances    Nicotine No     THC No     CBD No      Flavoring No     Other No     Unknown No      Family History   Problem Relation Age of Onset    Asthma Mother     Emphysema Mother     Cancer Father     Asthma Brother     Cancer Brother      Social History:  Marital Status: /Civil Union   Occupation: retired. A volunteer at the hospital.   Patient Pre-hospital Living Situation: Home  Patient Pre-hospital Level of Mobility: walks  Patient Pre-hospital Diet Restrictions: DM     Meds/Allergies   I have reviewed home medications with patient personally.  Prior to Admission medications    Medication Sig Start Date End Date Taking? Authorizing Provider   albuterol (ACCUNEB) 1.25 MG/3ML nebulizer solution Take 1.25 mg by nebulization every 6 (six) hours as needed for wheezing   Yes Historical Provider, MD   albuterol (PROVENTIL HFA,VENTOLIN HFA) 90 mcg/act inhaler 1 puff if needed   Yes Historical Provider, MD   amLODIPine (NORVASC) 5 mg tablet Take 5 mg by mouth 2 (two) times a day   Yes Historical Provider, MD   apixaban (ELIQUIS) 5 mg 5 mg 2 (two) times a day 2/10/25  Yes Historical Provider, MD   aspirin 81 mg chewable tablet Chew 1 tablet (81 mg total) daily 10/5/24  Yes BELGICA Nelson   atenolol (TENORMIN) 25 mg tablet Take 25 mg by mouth daily   Yes Historical Provider, MD   atorvastatin (LIPITOR) 40 mg tablet Take 1 tablet (40 mg total) by mouth every evening 10/16/24  Yes Annie Martines PA-C   Budeson-Glycopyrrol-Formoterol (Breztri Aerosphere) 160-9-4.8 MCG/ACT AERO Take 2 puffs by mouth Every 12 hours   Yes Historical Provider, MD   famotidine (PEPCID) 20 mg tablet Take 1 tablet (20 mg total) by mouth 2 (two) times a day 10/4/24  Yes BELGICA Nelson   glipiZIDE (GLUCOTROL XL) 10 mg 24 hr tablet Take 10 mg by mouth 2 (two) times a day   Yes Historical Provider, MD   linaGLIPtin (Tradjenta) 5 MG TABS Take 5 mg by mouth daily with dinner   Yes Historical Provider, MD   lisinopril (ZESTRIL) 20 mg tablet Take 20 mg by mouth 2 (two) times  a day   Yes Historical Provider, MD   LORazepam (ATIVAN) 0.5 mg tablet Take 0.5 mg by mouth if needed   Yes Historical Provider, MD   metFORMIN (GLUCOPHAGE-XR) 500 mg 24 hr tablet Take 500 mg by mouth daily with dinner   Yes Historical Provider, MD   montelukast (SINGULAIR) 10 mg tablet Take 1 tablet (10 mg total) by mouth daily at bedtime 10/16/24  Yes Annie Martines PA-C   Multiple Vitamins-Minerals (Multivitamin Adults) TABS Take 1 tablet by mouth daily   Yes Historical Provider, MD   collagenase (SANTYL) ointment Apply topically daily To wounds of R lateral foot and heel  Patient not taking: Reported on 5/7/2025 4/8/25 5/8/25  Era Cardenas PA-C   guaiFENesin (MUCINEX) 600 mg 12 hr tablet Take 1 tablet (600 mg total) by mouth 2 (two) times a day  Patient not taking: Reported on 5/7/2025 10/16/24   Annie Martines PA-C   OneTouch Ultra test strip  3/26/25   Historical Provider, MD   predniSONE 5 mg tablet Take 1 tablet (5 mg total) by mouth every other day  Patient taking differently: Take 5 mg by mouth daily 10/17/24   Annie Martines PA-C   metFORMIN (GLUCOPHAGE-XR) 750 mg 24 hr tablet Take 1 tablet (750 mg total) by mouth daily with dinner  Patient not taking: Reported on 5/7/2025 10/16/24 5/7/25  Annie Martines PA-C     Allergies   Allergen Reactions    Ciprofloxacin Shortness Of Breath    Sulfamethoxazole Shortness Of Breath    Trimethoprim Shortness Of Breath    Dexamethasone Other (See Comments)    Triamcinolone Other (See Comments)    Bactrim [Sulfamethoxazole-Trimethoprim] Fever     Fever of 107       Objective :  Temp:  [97.5 °F (36.4 °C)-98.5 °F (36.9 °C)] 97.5 °F (36.4 °C)  HR:  [] 93  BP: (124-161)/(58-80) 157/76  Resp:  [16-20] 18  SpO2:  [93 %-99 %] 94 %  O2 Device: None (Room air)    Physical Exam  Vitals and nursing note reviewed.   Constitutional:       General: He is not in acute distress.     Appearance: Normal appearance.      Comments: Elderly      HENT:      Head:  Normocephalic and atraumatic.      Right Ear: External ear normal.      Left Ear: External ear normal.      Nose: Nose normal.      Mouth/Throat:      Mouth: Mucous membranes are moist.      Pharynx: Oropharynx is clear.   Eyes:      General:         Right eye: No discharge.         Left eye: No discharge.      Extraocular Movements: Extraocular movements intact.      Pupils: Pupils are equal, round, and reactive to light.   Cardiovascular:      Rate and Rhythm: Regular rhythm. Tachycardia present.      Pulses: Normal pulses.      Heart sounds: Normal heart sounds. No murmur heard.  Pulmonary:      Effort: Pulmonary effort is normal. No respiratory distress.      Breath sounds: Normal breath sounds.   Abdominal:      General: Bowel sounds are normal. There is no distension.      Palpations: Abdomen is soft. There is no mass.      Tenderness: There is no abdominal tenderness.   Musculoskeletal:         General: Tenderness present. No swelling or deformity. Normal range of motion.      Cervical back: Normal range of motion and neck supple. No rigidity.   Skin:     General: Skin is warm and dry.      Capillary Refill: Capillary refill takes less than 2 seconds.      Coloration: Skin is not pale.      Findings: Erythema present.      Comments: Right foot chronic ulcer wound with surrounding erythema on the lateral aspect of the foot. Dry. Slightly tender to palpation.    Neurological:      General: No focal deficit present.      Mental Status: He is alert and oriented to person, place, and time. Mental status is at baseline.   Psychiatric:         Mood and Affect: Mood normal.         Behavior: Behavior normal.         Thought Content: Thought content normal.         Judgment: Judgment normal.        Lines/Drains:            Lab Results: I have reviewed the following results:  Results from last 7 days   Lab Units 05/07/25  0900   WBC Thousand/uL 12.54*   HEMOGLOBIN g/dL 11.5*   HEMATOCRIT % 36.0*   PLATELETS  Thousands/uL 310   SEGS PCT % 75   LYMPHO PCT % 14   MONO PCT % 8   EOS PCT % 2     Results from last 7 days   Lab Units 05/07/25  0900   SODIUM mmol/L 137   POTASSIUM mmol/L 3.9   CHLORIDE mmol/L 102   CO2 mmol/L 25   BUN mg/dL 17   CREATININE mg/dL 0.95   ANION GAP mmol/L 10   CALCIUM mg/dL 9.4   ALBUMIN g/dL 3.7   TOTAL BILIRUBIN mg/dL 0.31   ALK PHOS U/L 73   ALT U/L 10   AST U/L 9*   GLUCOSE RANDOM mg/dL 155*     Results from last 7 days   Lab Units 05/07/25  0900   INR  1.14         Lab Results   Component Value Date    HGBA1C 9.3 (H) 04/09/2025    HGBA1C 10.5 (H) 01/30/2025    HGBA1C 8.5 (H) 11/26/2024     Results from last 7 days   Lab Units 05/07/25  1242 05/07/25  0900   LACTIC ACID mmol/L 1.5 2.4*   PROCALCITONIN ng/ml  --  0.08       Imaging Results Review: I reviewed radiology reports from this admission including: CT A ap with run-off and xray(s).  Other Study Results Review: EKG was reviewed.     Administrative Statements   I have spent a total time of 55 minutes in caring for this patient on the day of the visit/encounter including Diagnostic results, Prognosis, Risks and benefits of tx options, Instructions for management, Patient and family education, Importance of tx compliance, Risk factor reductions, Impressions, Counseling / Coordination of care, Documenting in the medical record, Reviewing/placing orders in the medical record (including tests, medications, and/or procedures), Obtaining or reviewing history  , and Communicating with other healthcare professionals .    ** Please Note: This note has been constructed using a voice recognition system. **

## 2025-05-07 NOTE — ASSESSMENT & PLAN NOTE
"Lab Results   Component Value Date    HGBA1C 9.3 (H) 04/09/2025       No results for input(s): \"POCGLU\" in the last 72 hours.    Blood Sugar Average: Last 72 hrs:  Hold home regimen including glipizide, linagliptin and metformin  Placed on SSI  May need a long-acting insulin in the setting of chronic steroid use  Restricted carbohydrate diet  Hypoglycemia protocol    "

## 2025-05-07 NOTE — ASSESSMENT & PLAN NOTE
The patient presented with increasing pain on the right foot with history of severe peripheral vascular disease and DM ulcer wound on the right foot   The patient meets sepsis criteria with tachycardia and leukocytosis with underlying right chronic diabetic ulcer wound with cellulitis and osteomyelitis  Podiatry and vascular surgery input appreciated  Will require vascular intervention- a femoral endarterectomy prior to podiatric surgery. Vascular would like the patient to receive IV antibiotics get a better control of the infection prior to the procedure. He will need a transfer to [facility].    Started on cefazolin and Flagyl  Follow-up with cultures  Monitor fever trends/WBC

## 2025-05-07 NOTE — ASSESSMENT & PLAN NOTE
- Currently sinus rhythm on telemetry  -Continue atenolol 25 mg daily and Eliquis 5 mg twice daily for stroke risk reduction  -Continue to monitor

## 2025-05-07 NOTE — CONSULTS
Consultation - Cardiology   Name: Gold Van 79 y.o. male I MRN: [id number]  Unit/Bed#: ED 25 I Date of Admission: 5/7/2025   Date of Service: 5/7/2025 I Hospital Day: 0   Inpatient consult to Cardiology  Consult performed by: Richard Gibbs DO  Consult ordered by: Mai Burden PA-C        Physician Requesting Evaluation: Davonte Taylor DO   Reason for Evaluation / Principal Problem: Perioperative risk stratification  Assessment & Plan  Type 2 diabetes mellitus with complication, without long-term current use of insulin ([facility])    Lab Results   Component Value Date    HGBA1C 9.3 (H) 04/09/2025     - Counseled patient on dietary and lifestyle modification  -Plan of care per patient's primary care physician and primary team  Essential hypertension  - Patient has blood pressures at home are well-controlled  -Continue amlodipine 5 mg twice daily, atenolol 25 mg daily, lisinopril 20 mg twice daily  - Continue to monitor  - Counseled patient on dietary and lifestyle modifications.      Hyperlipidemia  - Counseled patient on dietary and lifestyle modifications  -Continue atorvastatin 40 mg daily  -Continue to monitor  CVA (cerebrovascular accident) (HCC)  - Counseled patient on dietary and lifestyle modifications   -continue aspirin 81 mg daily and Eliquis 5 mg twice daily  -Continue to monitor    Abnormal echocardiogram  - See echocardiographic imaging  Ulcer of right foot with fat layer exposed ([facility])  - Primary reason for your evaluation  -Plan of care per vascular team and podiatry  PAF (paroxysmal atrial fibrillation) ([facility])  - Currently sinus rhythm on telemetry  -Continue atenolol 25 mg daily and Eliquis 5 mg twice daily for stroke risk reduction  -Continue to monitor      Other summary comments:   -Would continue above medical therapy and counseled patient on need for sodium and fluid restricted diet and would avoid volume overload inpatient while hospitalized.    -According to the revised cardiac risk  index patient is high risk for planned operative procedure.  Patient and spouse at bedside fully understand and are accepting of these risks and wishes to proceed with surgery as planned.  In that setting along with no chest pain or anginal symptoms and recent stress testing relatively unrevealing patient is appropriate risk to proceed with surgery when deemed a candidate by vascular team.  If deemed necessary by vascular team patient can hold Eliquis 2 days prior to procedure and should have this be reinitiated as soon after procedure is possible once cleared by vascular team.  Patient can hold aspirin for 5 days prior to procedure if deemed necessary by vascular team but again should have this restarted as soon after procedure is possible.      -Cardiology will sign off at this time.  Please contact or reconsult with any further questions or concerns.    HPI: Gold Van is a 79 y.o. year old male with history of COVID-19, suspected COPD and initially placed on dual antiplatelet therapy by neurology team after subacute infarction on MRI of brain with paroxysmal atrial fibrillation requiring him to be transition to oral anticoagulation with Eliquis and aspirin therapy therefore managed by neurology who on echocardiographic imaging was found to have low normal function with regional variations of the posterior wall with eventual outpatient stress testing and ambulatory event monitoring.  He presented to [facility] at request of his podiatrist for worsening pain in right foot ulcer.  - Currently at bedside patient denies any chest pain, palpitations, lightheadedness or dizziness, loss of consciousness, shortness of breath, orthopnea or bendopnea.  He denies any significant lower extremity edema apart from some swelling near the foot ulcer.  He states that over the last day or 2 his pain has significantly worsened which is why his podiatrist recommended ER evaluation.  - Cardiology requested see  patient for perioperative risk stratification prior to possible femoral endarterectomy.      EKG:   - Sinus rhythm on telemetry    MOST  RECENT CARDIAC IMAGING:   - Nuclear stress test 1/9/2025 showing no diagnostic evidence of reversible ischemia on perfusion imaging.    -Ambulatory event monitor 1/6/2020 2020 showing predominantly sinus rhythm average heart rate 74 bpm with no significant pauses or advanced AV block.  There was occasional ventricular ectopic activity, for 2% of the total monitored beats and paroxysmal atrial fibrillation/atrial flutter accounting for approximately 2% of the monitored timeframe.      - Transthoracic echocardiogram 10/1/2024 showing left-ventricular systolic function lower limits of normal stated LVEF 50% with hypokinesia of the posterior wall grade 1 diastolic dysfunction, mild aortic regurgitation and IVC normal in size with estimated RVSP 37 mmHg.      Review of Systems:   Review of Systems   Constitutional:  Negative for chills, diaphoresis, fatigue and fever.   HENT:  Negative for trouble swallowing and voice change.    Eyes:  Negative for pain and redness.   Respiratory:  Negative for shortness of breath and wheezing.    Cardiovascular:  Positive for leg swelling (per patient only near right foot wound). Negative for chest pain and palpitations.   Gastrointestinal:  Negative for abdominal pain, blood in stool, diarrhea, nausea and vomiting.   Genitourinary:  Negative for dysuria.   Musculoskeletal:  Positive for arthralgias. Negative for neck pain and neck stiffness.   Skin:  Positive for wound.   Neurological:  Negative for dizziness, syncope, light-headedness and headaches.   Psychiatric/Behavioral:  Negative for agitation and confusion.    All other systems reviewed and are negative.         Historical Information   Past Medical History:   Diagnosis Date    Asthma     COVID-19     CVA (cerebral vascular accident) (HCC)     Diabetes mellitus (HCC)     Environmental allergies      Hyperlipidemia     Hypertension     Macular degeneration 2020    right eye    Orthostatic hypotension     Osteopenia     Pneumonia     Pneumothorax     Sinusitis      Past Surgical History:   Procedure Laterality Date    CARPAL TUNNEL RELEASE Left 2024    CATARACT EXTRACTION Left 2024    COLONOSCOPY      KNEE CARTILAGE SURGERY Right 1964    VASECTOMY      WISDOM TOOTH EXTRACTION      x4     Social History     Substance and Sexual Activity   Alcohol Use Never     Social History     Substance and Sexual Activity   Drug Use Never     Social History     Tobacco Use   Smoking Status Former   Smokeless Tobacco Never   Tobacco Comments    quit about 25 years ago       Family History:   Family History   Problem Relation Age of Onset    Asthma Mother     Emphysema Mother     Cancer Father     Asthma Brother     Cancer Brother         Meds/Allergies   all current active meds have been reviewed  Not in a hospital admission.    Allergies   Allergen Reactions    Ciprofloxacin Shortness Of Breath    Sulfamethoxazole Shortness Of Breath    Trimethoprim Shortness Of Breath    Dexamethasone Other (See Comments)    Triamcinolone Other (See Comments)    Bactrim [Sulfamethoxazole-Trimethoprim] Fever     Fever of 107       Objective   Vitals: Blood pressure 136/73, pulse 98, temperature 97.8 °F (36.6 °C), temperature source Temporal, resp. rate 20, height 6' (1.829 m), weight 73.9 kg (162 lb 14.7 oz), SpO2 93%., Body mass index is 22.1 kg/m².,   Orthostatic Blood Pressures      Flowsheet Row Most Recent Value   Blood Pressure 136/73 filed at 2025 1100   Patient Position - Orthostatic VS Sitting filed at 2025 1100            Systolic (24hrs), Av , Min:130 , Max:161     Diastolic (24hrs), Av, Min:58, Max:80    Physical Exam:  Physical Exam  Vitals reviewed.   Constitutional:       General: He is not in acute distress.     Appearance: Normal appearance. He is not diaphoretic.   HENT:      Head:  Normocephalic and atraumatic.   Eyes:      General:         Right eye: No discharge.         Left eye: No discharge.   Neck:      Comments: Trachea midline, no significant JVD appreciated  Cardiovascular:      Rate and Rhythm: Normal rate and regular rhythm.      Heart sounds:      No friction rub.   Pulmonary:      Effort: No respiratory distress.      Breath sounds: No wheezing.      Comments: Decreased breath sounds bilaterally  Chest:      Chest wall: No tenderness.   Abdominal:      General: Bowel sounds are normal.      Palpations: Abdomen is soft.      Tenderness: There is no abdominal tenderness. There is no rebound.   Musculoskeletal:      Right lower leg: Edema (trace) present.      Left lower leg: Edema (trace) present.   Skin:     General: Skin is warm and dry.   Neurological:      Mental Status: He is alert.      Comments: Awake, alert, able to answer questions appropriately.   Psychiatric:         Mood and Affect: Mood normal.         Behavior: Behavior normal.            Lab Results:   Labs reviewed and prominent abnormalities reviewed above and/or below.    Troponins:      BNP:

## 2025-05-07 NOTE — TELEPHONE ENCOUNTER
Good morning,    This patient at [place] will need a right femoral endarterectomy with antegrade angiogram with intervention possible bypass. Will need hybrid room at [place] or [place]. We are awaiting cards clearance, vein mapping, and for infection to clear. So we can plan for next week.    Patient is Scooter Van [birth year]. Patient is a [facility] Doctor patient and was tentatively scheduled this month but was waiting on a CTA to get done    Richard Aiken

## 2025-05-07 NOTE — ASSESSMENT & PLAN NOTE
Currently in sinus rhythm  Urology input appreciated  Continue atenolol 25 mg daily for rate control  Hold Eliquis for now, currently on heparin infusion

## 2025-05-07 NOTE — ASSESSMENT & PLAN NOTE
Primary cardiologist- Dr. Ho  LVEF of 50% on TTE 10/2024  Cardiology input appreciated-patient is appropriate risk to proceed with surgery  Continue medical management   Currently not on diuretic therapy   Monitor volume status close, cautious with IV fluids

## 2025-05-07 NOTE — ASSESSMENT & PLAN NOTE
Lab Results   Component Value Date    HGBA1C 9.3 (H) 04/09/2025     - Counseled patient on dietary and lifestyle modification  -Plan of care per patient's primary care physician and primary team

## 2025-05-07 NOTE — ASSESSMENT & PLAN NOTE
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   Podiatry input appreciated-will likely need a partial fifth ray amputation following revascularization   Will start on cefazolin and Flagyl  Continue with local wound care

## 2025-05-08 LAB
ANION GAP SERPL CALCULATED.3IONS-SCNC: 6 MMOL/L (ref 4–13)
APTT PPP: 66 SECONDS (ref 23–34)
APTT PPP: 68 SECONDS (ref 23–34)
BUN SERPL-MCNC: 15 MG/DL (ref 5–25)
CALCIUM SERPL-MCNC: 8.5 MG/DL (ref 8.4–10.2)
CHLORIDE SERPL-SCNC: 104 MMOL/L (ref 96–108)
CO2 SERPL-SCNC: 24 MMOL/L (ref 21–32)
CREAT SERPL-MCNC: 0.93 MG/DL (ref 0.6–1.3)
ERYTHROCYTE [DISTWIDTH] IN BLOOD BY AUTOMATED COUNT: 13.4 % (ref 11.6–15.1)
GFR SERPL CREATININE-BSD FRML MDRD: 77 ML/MIN/1.73SQ M
GLUCOSE SERPL-MCNC: 142 MG/DL (ref 65–140)
GLUCOSE SERPL-MCNC: 168 MG/DL (ref 65–140)
GLUCOSE SERPL-MCNC: 227 MG/DL (ref 65–140)
GLUCOSE SERPL-MCNC: 272 MG/DL (ref 65–140)
GLUCOSE SERPL-MCNC: 322 MG/DL (ref 65–140)
HCT VFR BLD AUTO: 31.2 % (ref 36.5–49.3)
HGB BLD-MCNC: 10 G/DL (ref 12–17)
MCH RBC QN AUTO: 29.4 PG (ref 26.8–34.3)
MCHC RBC AUTO-ENTMCNC: 32.1 G/DL (ref 31.4–37.4)
MCV RBC AUTO: 92 FL (ref 82–98)
PLATELET # BLD AUTO: 248 THOUSANDS/UL (ref 149–390)
PMV BLD AUTO: 9.2 FL (ref 8.9–12.7)
POTASSIUM SERPL-SCNC: 4.1 MMOL/L (ref 3.5–5.3)
PROCALCITONIN SERPL-MCNC: 0.11 NG/ML
RBC # BLD AUTO: 3.4 MILLION/UL (ref 3.88–5.62)
SODIUM SERPL-SCNC: 134 MMOL/L (ref 135–147)
WBC # BLD AUTO: 10.11 THOUSAND/UL (ref 4.31–10.16)

## 2025-05-08 PROCEDURE — 85730 THROMBOPLASTIN TIME PARTIAL: CPT | Performed by: INTERNAL MEDICINE

## 2025-05-08 PROCEDURE — 80048 BASIC METABOLIC PNL TOTAL CA: CPT | Performed by: NURSE PRACTITIONER

## 2025-05-08 PROCEDURE — 97163 PT EVAL HIGH COMPLEX 45 MIN: CPT

## 2025-05-08 PROCEDURE — 85027 COMPLETE CBC AUTOMATED: CPT | Performed by: NURSE PRACTITIONER

## 2025-05-08 PROCEDURE — 82948 REAGENT STRIP/BLOOD GLUCOSE: CPT

## 2025-05-08 PROCEDURE — 84145 PROCALCITONIN (PCT): CPT | Performed by: NURSE PRACTITIONER

## 2025-05-08 PROCEDURE — 99232 SBSQ HOSP IP/OBS MODERATE 35: CPT

## 2025-05-08 PROCEDURE — 99232 SBSQ HOSP IP/OBS MODERATE 35: CPT | Performed by: NURSE PRACTITIONER

## 2025-05-08 RX ADMIN — OXYCODONE HYDROCHLORIDE 10 MG: 10 TABLET ORAL at 12:40

## 2025-05-08 RX ADMIN — CEFAZOLIN SODIUM 2000 MG: 2 SOLUTION INTRAVENOUS at 14:02

## 2025-05-08 RX ADMIN — FAMOTIDINE 20 MG: 20 TABLET, FILM COATED ORAL at 17:26

## 2025-05-08 RX ADMIN — INSULIN LISPRO 1 UNITS: 100 INJECTION, SOLUTION INTRAVENOUS; SUBCUTANEOUS at 08:53

## 2025-05-08 RX ADMIN — AMLODIPINE BESYLATE 5 MG: 5 TABLET ORAL at 08:52

## 2025-05-08 RX ADMIN — INSULIN LISPRO 3 UNITS: 100 INJECTION, SOLUTION INTRAVENOUS; SUBCUTANEOUS at 21:24

## 2025-05-08 RX ADMIN — LISINOPRIL 20 MG: 20 TABLET ORAL at 08:52

## 2025-05-08 RX ADMIN — ATORVASTATIN CALCIUM 40 MG: 40 TABLET, FILM COATED ORAL at 17:26

## 2025-05-08 RX ADMIN — CEFAZOLIN SODIUM 2000 MG: 2 SOLUTION INTRAVENOUS at 06:21

## 2025-05-08 RX ADMIN — BUDESONIDE, GLYCOPYRROLATE, AND FORMOTEROL FUMARATE 2 PUFF: 160; 9; 4.8 AEROSOL, METERED RESPIRATORY (INHALATION) at 21:24

## 2025-05-08 RX ADMIN — FAMOTIDINE 20 MG: 20 TABLET, FILM COATED ORAL at 08:52

## 2025-05-08 RX ADMIN — BUDESONIDE, GLYCOPYRROLATE, AND FORMOTEROL FUMARATE 2 PUFF: 160; 9; 4.8 AEROSOL, METERED RESPIRATORY (INHALATION) at 08:53

## 2025-05-08 RX ADMIN — ACETAMINOPHEN 975 MG: 325 TABLET ORAL at 05:49

## 2025-05-08 RX ADMIN — CEFAZOLIN SODIUM 2000 MG: 2 SOLUTION INTRAVENOUS at 22:36

## 2025-05-08 RX ADMIN — METRONIDAZOLE 500 MG: 500 INJECTION, SOLUTION INTRAVENOUS at 14:38

## 2025-05-08 RX ADMIN — ACETAMINOPHEN 975 MG: 325 TABLET ORAL at 14:02

## 2025-05-08 RX ADMIN — LISINOPRIL 20 MG: 20 TABLET ORAL at 21:24

## 2025-05-08 RX ADMIN — ACETAMINOPHEN 975 MG: 325 TABLET ORAL at 21:24

## 2025-05-08 RX ADMIN — INSULIN LISPRO 3 UNITS: 100 INJECTION, SOLUTION INTRAVENOUS; SUBCUTANEOUS at 17:26

## 2025-05-08 RX ADMIN — METRONIDAZOLE 500 MG: 500 INJECTION, SOLUTION INTRAVENOUS at 01:53

## 2025-05-08 RX ADMIN — OXYCODONE HYDROCHLORIDE 10 MG: 10 TABLET ORAL at 05:49

## 2025-05-08 RX ADMIN — MONTELUKAST 10 MG: 10 TABLET, FILM COATED ORAL at 21:24

## 2025-05-08 RX ADMIN — Medication 1 TABLET: at 08:52

## 2025-05-08 RX ADMIN — PREDNISONE 5 MG: 1 TABLET ORAL at 08:52

## 2025-05-08 RX ADMIN — ATENOLOL 25 MG: 25 TABLET ORAL at 08:52

## 2025-05-08 RX ADMIN — AMLODIPINE BESYLATE 5 MG: 5 TABLET ORAL at 21:24

## 2025-05-08 RX ADMIN — HEPARIN SODIUM 20 UNITS/KG/HR: 10000 INJECTION, SOLUTION INTRAVENOUS at 05:18

## 2025-05-08 NOTE — ASSESSMENT & PLAN NOTE
History of CVA 9/2024  Continue stroke prevention with statin, aspirin  Hold Eliquis for now while waiting for vascular procedure   Continue with heparin infusion

## 2025-05-08 NOTE — ASSESSMENT & PLAN NOTE
Lab Results   Component Value Date    HGBA1C 9.3 (H) 04/09/2025       Recent Labs     05/07/25  1557 05/07/25 2055 05/08/25  0719   POCGLU 117 261* 168*       Blood Sugar Average: Last 72 hrs:  (P) 182  Home regimen includes Tradjenta 5 mg daily, 500 mg Metformin daily, 10 mg Glipizide BID  Hold home regimen  Check blood sugars ACHS  Continue SSI

## 2025-05-08 NOTE — ASSESSMENT & PLAN NOTE
EKG on admission showed sinus tachycardia.   Continue home dose of atenolol  Eliquis on hold and patient was started on heparin gtt in anticipation of surgery

## 2025-05-08 NOTE — ASSESSMENT & PLAN NOTE
Patient with chronic diabetic foot wound with cellulitis and osteomyelitis.   MRI right foot (4/17) suspect early development of osteomyelitis of the fifth metatarsal head  X-ray right foot (5/7)- Ulceration along the lateral aspect of the foot and soft tissue swelling along the dorsum of the foot without radiographic findings of osteomyelitis.   Podiatry consulted, input appreciated-patient will likely need a partial fifth ray amputation following revascularization surgery   Continue cefazolin and Flagyl  Continue with local wound care

## 2025-05-08 NOTE — PLAN OF CARE
Problem: Potential for Falls  Goal: Patient will remain free of falls  Description: INTERVENTIONS:- Educate patient/family on patient safety including physical limitations- Instruct patient to call for assistance with activity - Consult OT/PT to assist with strengthening/mobility - Keep Call bell within reach- Keep bed low and locked with side rails adjusted as appropriate- Keep care items and personal belongings within reach- Initiate and maintain comfort rounds- Make Fall Risk Sign visible to staff- Offer Toileting every 2 Hours, in advance of need- Initiate/Maintain bed alarm- Obtain necessary fall risk management equipment: yellow socks- Apply yellow socks and bracelet for high fall risk patients- Consider moving patient to room near nurses station    Problem: PAIN - ADULT  Goal: Verbalizes/displays adequate comfort level or baseline comfort level  Description: Interventions:- Encourage patient to monitor pain and request assistance- Assess pain using appropriate pain scale- Administer analgesics based on type and severity of pain and evaluate response- Implement non-pharmacological measures as appropriate and evaluate response- Consider cultural and social influences on pain and pain management- Notify physician/advanced practitioner if interventions unsuccessful or patient reports new pain  Outcome: Progressing     Problem: INFECTION - ADULT  Goal: Absence or prevention of progression during hospitalization  Description: INTERVENTIONS:- Assess and monitor for signs and symptoms of infection- Monitor lab/diagnostic results- Monitor all insertion sites, i.e. indwelling lines, tubes, and drains- Monitor endotracheal if appropriate and nasal secretions for changes in amount and color- [place] appropriate cooling/warming therapies per order- Administer medications as ordered- Instruct and encourage patient and family to use good hand hygiene technique- Identify and instruct in appropriate isolation precautions  for identified infection/condition  Outcome: Progressing

## 2025-05-08 NOTE — ASSESSMENT & PLAN NOTE
History of CVA in 9/2024-no reported residuals  Continue aspirin, statin   Holding eliquis, continue heparin gtt

## 2025-05-08 NOTE — PLAN OF CARE
Problem: PHYSICAL THERAPY ADULT  Goal: Performs mobility at highest level of function for planned discharge setting.  See evaluation for individualized goals.  Description: Treatment/Interventions: Functional transfer training, LE strengthening/ROM, Elevations, Therapeutic exercise, Endurance training, Gait training  Equipment Recommended: Cane, Walker       See flowsheet documentation for full assessment, interventions and recommendations.  Outcome: Progressing  Note: Prognosis: Fair  Problem List: Decreased strength, Decreased endurance, Impaired balance, Decreased mobility, Pain  Assessment: Pt is 79 y.o. male seen for PT evaluation s/p admit to [facility] on [facility]/7/2025 w/ Sepsis without acute organ dysfunction (HCC). PT consulted to assess pt's functional mobility and d/c needs. Order placed for PT eval and tx, w/ out of bed to chair order. Pt agreeable to PT  session upon arrival, pt found seated OOB in recliner.  PTA, pt was independent w/ all functional mobility w/ cane and lives w/ spouse in 1 level home .  Pt to benefit from continued PT tx to address deficits and maximize level of functional independent mobility and consistency. Upon conclusion pt  seated in recliner, with all needs in reach, and chair alarm intact. Complexity: Comorbidities affecting pt's physical performance at time of assessment include:  AFIB, HTN, foot swelling PAD, sepsis, Right ulcer osteomyelitis . Personal factors affecting pt at time of IE include: limited mobility, ambulating with assistive device, steps to enter home, and inability to navigate without external assistance. Please find objective findings from PT assessment regarding body systems outlined above with impairments and limitations including weakness, impaired balance, gait deviations, pain, decreased activity tolerance, and fall risk.  Pt's clinical presentation is currently unstable/unpredictable seen in pt's presentation of abnormal H&H, abnormal sodium  levels, abnormal blood sugar levels, and pain. The patient's AM-PAC Basic Mobility Inpatient Short Form Raw Score is 17 .  Based on patient presentations and impairments, pt would most appropriately benefit from Level 2 resource intensity upon discharge. A Raw score of greater than 16 suggests the patient may benefit from discharge to post-acute rehabilitation services. Please also refer to the recommendation of the Physical Therapist for safe discharge planning. RN verbalized pt appropriate for PT session.        Rehab Resource Intensity Level, PT: II (Moderate Resource Intensity)    See flowsheet documentation for full assessment.

## 2025-05-08 NOTE — PROGRESS NOTES
"Progress Note - Vascular Surgery   Name: Gold Van 79 y.o. male I MRN: [id number]  Unit/Bed#: -01 I Date of Admission: 5/7/2025   Date of Service: 5/8/2025 I Hospital Day: 1    Assessment & Plan  Ulcer of right foot with fat layer exposed (HCC)  80yo male PMH afib on eliquis, stroke 9/2024, HTN, DM, HLD presents with infected right foot wound. Vascular consulted to optimized blood flow.    -LEADs 4/18: \"RIGHT LOWER LIMB:  There is a >75% stenosis noted in the proximal superficial femoral artery and a  50-75% stenosis in the distal superficial femoral artery. Occlusion vs high  grade stenosis noted in the distal posterior tibial artery. Diffuse disease with  calcification and heavy shadowing throughout the remaining femoro-popliteal and  tibio-peroneal segments may obscure more significant stenosis.  Ankle/Brachial index: 0.42 which is in the ischemic disease category (Prior  0.83)  PVR/ PPG tracings are dampened.  Metatarsal pressure of  15 mmHg  Great toe pressure of  16 mmHg, below the healing range     LEFT LOWER LIMB:  There is occlusion vs high grade stenosis in the posterior tibial and anterior  tibial arteries. Diffuse disease with calcification and heavy shadowing  throughout the remaining femoro-popliteal and tibio-peroneal segments may  obscure more significant stenosis.  Ankle/Brachial index:   1.06 which is in the normal category (Prior 1.03)  PVR/ PPG tracings are dampened.  Metatarsal pressure of  85 mmHg  Great toe pressure of  45 mmHg, below the healing range for a diabetic patient     Compared to previous study on 7/3/2023 , there is new stenosis and occlusion on  the right. New occlusion of the left. Significant decrease of right RIC.  Bilateral toe pressure now below healing range.\"   -CTA 5/6 final read pending   -right foot wound on the lateral aspect of the foot, sensation/motor intact; nonpalpable R PT, 1+ DP, 2+ femoral   -afebrile, tachycardic   -WBC 10 (12.5)   -Hgb stable   -Cr " wnl    Plan:  -patient scheduled for right femoral endarterectomy with Dr Aiken 5/13 at [facility]; patient can be transferred over the weekend once bed is available  -okay for regular diet  -glucose control  -podiatry and cardiology consults, appreciate recs  -vein mapping complete  -IV abx, IVF  -local wound care to right foot per podiatry  -hold eliquis and start heparin drip  -discussed with on call vascular surgeon Dr Greene; final recs pending attending attestation  Severe peripheral arterial disease (HCC)  -scheduled 5/13/25 for right femoral endarterectomy at [facility]        Subjective   Patient reports some improvement in pain in right foot but still experiences jolts of pain. No fever, chills. Updated patient with the plan to transfer to [facility].     Objective :  Temp:  [97.5 °F (36.4 °C)-98.5 °F (36.9 °C)] 98 °F (36.7 °C)  HR:  [] 83  BP: (124-157)/(58-78) 143/78  Resp:  [16-20] 17  SpO2:  [93 %-98 %] 97 %  O2 Device: None (Room air)    I/O         05/06 0701  05/07 0700 05/07 0701  05/08 0700 05/08 0701  05/09 0700    P.O.  120     I.V. (mL/kg)  234.3 (3.2)     IV Piggyback  2050     Total Intake(mL/kg)  2404.3 (32.5)     Urine (mL/kg/hr)  1600     Total Output  1600     Net  +804.3                    Physical Exam  Constitutional:       Appearance: Normal appearance.   HENT:      Mouth/Throat:      Mouth: Mucous membranes are moist.      Pharynx: Oropharynx is clear.   Cardiovascular:      Rate and Rhythm: Normal rate.   Pulmonary:      Effort: Pulmonary effort is normal.   Abdominal:      General: Abdomen is flat.   Musculoskeletal:         General: Normal range of motion.      Comments: Right lateral food wound.   Skin:     General: Skin is warm and dry.   Neurological:      General: No focal deficit present.      Mental Status: He is alert.         Lab Results: I have reviewed the following results:  Recent Labs     05/07/25  0900 05/07/25  1242 05/07/25  1819 05/08/25  0425 05/08/25  0758   WBC 12.54*  --    --  10.11  --    HGB 11.5*  --   --  10.0*  --    HCT 36.0*  --   --  31.2*  --      --   --  248  --    SODIUM 137  --   --  134*  --    K 3.9  --   --  4.1  --      --   --  104  --    CO2 25  --   --  24  --    BUN 17  --   --  15  --    CREATININE 0.95  --   --  0.93  --    GLUC 155*  --   --  227*  --    AST 9*  --   --   --   --    ALT 10  --   --   --   --    ALB 3.7  --   --   --   --    TBILI 0.31  --   --   --   --    ALKPHOS 73  --   --   --   --    PTT 31  --    < >  --  66*   INR 1.14  --   --   --   --    LACTICACID 2.4* 1.5  --   --   --     < > = values in this interval not displayed.         VTE Pharmacologic Prophylaxis: Heparin  VTE Mechanical Prophylaxis: sequential compression device    Mai Burden PA-C

## 2025-05-08 NOTE — ASSESSMENT & PLAN NOTE
"LEADs 4/18-   Right-50 to 75% stenosis in the distal superficial femoral artery.  Occlusion versus high-grade stenosis in the distal posterior tibial artery.  Great toe pressure is below the healing range  Left-there is occlusive versus high-grade stenosis in the posterior tibial and anterior tibial arteries. Great toe pressure is below the healing range for diabetic patient.  Compared to 7/3/2023-continue stenosis and occlusion on the right.  New occlusion of the left.  Significant decrease of right RIC.  CTA (5/6) \"Focal high-grade stenosis of distal right CFA and diffuse atherosclerotic disease of right SFA resulting in moderate to severe stenoses with focal near complete occlusions at the proximal and distal segments. Short segment occlusions of proximal right anterior tibial and peroneal arteries with reconstitution in the medial to distal segments. Right posterior tibial artery is occluded. Occluded left SFA with reconstitution in the distal segment. Left anterior tibial and posterior tibial arteries are occluded. One-vessel runoff of the left lower extremity through the peroneal artery. Focal high-grade stenosis at the proximal right renal artery.\"  Vascular surgery input appreciated   The patient will required a femoral endarterectomy with possible bypass. This is scheduled for 5/13 at [facility].  Continue statin, aspirin, heparin infusion  Will initiate transfer on Sunday/Monday.   "

## 2025-05-08 NOTE — CASE MANAGEMENT
Case Management Assessment & Discharge Planning Note    Patient name Gold Van  Location /-01 MRN [id number]  : 1945 Date 2025       Current Admission Date: 2025  Current Admission Diagnosis:Sepsis without acute organ dysfunction ([facility])   Patient Active Problem List    Diagnosis Date Noted Date Diagnosed    Sepsis without acute organ dysfunction ([facility]) 2025     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 ([facility]) 2025     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 ([facility]) 2025     PAF (paroxysmal atrial fibrillation) ([facility]) 2025     Cardiomyopathy ([facility]) 2025     Ulcer of right foot with fat layer exposed ([facility]) 2025     Severe peripheral arterial disease ([facility]) 2024     Moderate protein-calorie malnutrition ([facility]) 10/09/2024     Gastroesophageal reflux disease without esophagitis 10/04/2024     Impaired mobility and activities of daily living 10/04/2024     Neuropathy 10/04/2024     Foot drop, left 10/04/2024     Abnormal echocardiogram 10/03/2024     CVA (cerebrovascular accident) ([facility]) 10/02/2024     Dysmetria 10/01/2024     COVID-19 2024     Acute respiratory insufficiency 2024     Shortness of breath 2024     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[facility]) 2024     History of pneumothorax 2024     Acute respiratory failure with hypoxia ([facility]) 2024     Non-recurrent bilateral inguinal hernia without obstruction or gangrene 2024     Pruritus 2024     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 2021     Hyponatremia 2021     Pulmonary nodule 2021     Type 2 diabetes mellitus with complication, without long-term current use of insulin ([facility]) 10/23/2017     Essential hypertension 10/23/2017     Mild intermittent asthma without complication 10/23/2017     Mixed hyperlipidemia 10/23/2017       LOS (days): 1  Geometric Mean LOS (GMLOS) (days):  3.5  Days to GMLOS:2.2     OBJECTIVE:    Risk of Unplanned Readmission Score: 22.09         Current admission status: Inpatient  Referral Reason: Other (d/c planning)    Preferred Pharmacy:   [facility] [place] - [address]  [address]  [place] [address]  Phone: [phone] Fax: [phone]    [facility] [place] - [address]  [address] [facility] [address]  Phone: [phone] Fax: [phone]    [facility] ([facility]) #[id number] [place], [place] - [address] [address]  Phone: [phone] Fax: [phone]    Primary Care Provider: Shayne Diaz MD    Primary Insurance: [facility] RE[facility]  Secondary Insurance:     ASSESSMENT:  Active Health Care Proxies       Mya Van Health Care Representative - Spouse   Primary Phone: [phone] (Mobile)  Home Phone: [phone]                 Advance Directives  Does patient have a Health Care POA?: Yes  Does patient have Advance Directives?: Yes         Readmission Root Cause  30 Day Readmission: No    Patient Information  Admitted from:: Home  During Assessment patient was accompanied by: Not accompanied during assessment  Assessment information provided by:: Patient  Primary Caregiver: Self  Caregiver's Name:: Mya Van  Caregiver's Relationship to Patient:: Significant Other  Support Systems: Spouse/significant other  County of Residence: [facility] [place] do you live in[place]  Home entry access options. Select all that apply.: Scott Rob  Number of steps to enter home.:  (13 steps)  [place] of [place]idence[place]  Living Arrangements: Lives w/ Spouse/significant other  Is patient a ?: No    Activities of Daily Living Prior to Admission  Functional Status: Independent  Completes ADLs independently?: Yes  Ambulates independently?: Yes  Does patient use assisted devices?: Yes  Assisted  Devices (DME) used: Straight Cane  Does patient currently own DME?: Yes  What DME does the patient currently own?: Other (Comment) (bp cuff, pulse ox,glucometer)  Does patient have a history of Outpatient Therapy (PT/OT)?: Yes ([facility] [place])  Does the patient have a history of Short-Term Rehab?: Yes ([facility])  Does patient have a history of HHC?: No  Does patient currently have HHC?: No         Patient Information Continued  Income Source: Pension/FDC  Does patient have prescription coverage?: Yes (Jenni Calero-  [employer])  Can the patient afford their medications and any related supplies (such as glucometers or test strips)?: Yes  Does patient receive dialysis treatments?: No  Does patient have a history of substance abuse?: No  Does patient have a history of Mental Health Diagnosis?: No         Means of Transportation  Means of Transport to Appts:: Drives Self          DISCHARGE DETAILS:    Discharge planning discussed with:: patient  Freedom of Choice: Yes  Comments - Freedom of Choice: pt  needs vascular intervention at [facility] for severe periphersl arterial disease- pt is to have a vascular procedure on 5/13/2025                     Requested Home Health Care         Is the patient interested in HHC at discharge?: No    DME Referral Provided  Referral made for DME?: No    Other Referral/Resources/Interventions Provided:  Interventions: Acute Hospital Transfer  Referral Comments: pt to be transfered on to [facility]   for a procedure on 5/1/3/2025- pt on heparin drip, vascular & podiatry consults, IV ancef    Would you like to participate in our [facility] service program?  : No - Declined    Treatment Team Recommendation: Acute Hospital Transfer ([facility]  for a vascular procedure on 5/13/2025-   S vs [facility])

## 2025-05-08 NOTE — ASSESSMENT & PLAN NOTE
Lab Results   Component Value Date    HGBA1C 9.3 (H) 04/09/2025       Recent Labs     05/07/25  1557 05/07/25 2055 05/08/25  0719   POCGLU 117 261* 168*       Blood Sugar Average: Last 72 hrs:  (P) 182  Hold home regimen including glipizide, linagliptin and metformin  Placed on SSI  May need a long-acting insulin in the setting of chronic steroid use  Restricted carbohydrate diet  Hypoglycemia protocol

## 2025-05-08 NOTE — PHYSICAL THERAPY NOTE
PHYSICAL THERAPY EVALUATION  NAME:  Gold Van  DATE: 05/08/25    AGE:   79 y.o.  Mrn:   [id number]  ADMIT DX:  PAF (paroxysmal atrial fibrillation) (HCC) [I48.0]  Essential hypertension [I10]  Foot swelling [M79.89]  Peripheral arterial disease (HCC) [I73.9]  Right foot infection [L08.9]  Sepsis (HCC) [A41.9]  Ulcer of right foot with fat layer exposed (HCC) [L97.512]  Problem List:   Patient Active Problem List   Diagnosis    Type 2 diabetes mellitus with complication, without long-term current use of insulin (HCC)    Essential hypertension    Mild intermittent asthma without complication    Mixed hyperlipidemia    Hyponatremia    Pulmonary nodule    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    Non-recurrent bilateral inguinal hernia without obstruction or gangrene    Pruritus    History of pneumothorax    Acute respiratory failure with hypoxia (HCC)    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HCC)    Shortness of breath    COVID-19    Acute respiratory insufficiency    Dysmetria    CVA (cerebrovascular accident) (HCC)    Abnormal echocardiogram    Gastroesophageal reflux disease without esophagitis    Impaired mobility and activities of daily living    Neuropathy    Foot drop, left    Moderate protein-calorie malnutrition (HCC)    Severe peripheral arterial disease (HCC)    Ulcer of right foot with fat layer exposed (HCC)    PAF (paroxysmal atrial fibrillation) (HCC)    Cardiomyopathy (HCC)    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)    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 (HCC)    Sepsis without acute organ dysfunction (HCC)       Past Medical History  Past Medical History:   Diagnosis Date    Asthma     COVID-19     CVA (cerebral vascular accident) (HCC)     Diabetes mellitus (HCC)     Environmental allergies     Hyperlipidemia     Hypertension     Macular degeneration 07/13/2020    right eye    Orthostatic hypotension     Osteopenia      "Pneumonia     Pneumothorax     Sinusitis        Past Surgical History  Past Surgical History:   Procedure Laterality Date    CARPAL TUNNEL RELEASE Left 08/2024    CATARACT EXTRACTION Left 07/2024    COLONOSCOPY      KNEE CARTILAGE SURGERY Right 1964    VASECTOMY      WISDOM TOOTH EXTRACTION      x4       Length Of Stay: 1  Performed at least 2 patient identifiers during session: Name and Epic photo       05/08/25 0816   PT Last Visit   PT Visit Date 05/08/25   Note Type   Note type Evaluation   Pain Assessment   Pain Assessment Tool 0-10   Pain Score 8   Pain Location/Orientation Orientation: Right;Location: Foot   Pain Onset/Description Onset: Ongoing   Effect of Pain on Daily Activities limits mobility   Patient's Stated Pain Goal No pain   Hospital Pain Intervention(s) Medication (See MAR)   Home Living   Type of Home House   Home Layout Two level;Performs ADLs on one level;Able to live on main level with bedroom/bathroom   Bathroom Shower/Tub Walk-in shower   Bathroom Toilet Raised   Bathroom Equipment Grab bars in shower   Bathroom Accessibility Accessible   Home Equipment Walker;Cane  (cane at baseline)   Prior Function   Level of [place] Independent with functional mobility;Needs assistance with IADLS;Independent with ADLs   Lives With Spouse   Receives Help From Family   IADLs Independent with driving;Independent with meal prep;Independent with medication management   Falls in the last 6 months 0   Vocational Retired   Cognition   Overall Cognitive Status WFL   Arousal/Participation Alert   Orientation Level Oriented X4   Memory Within functional limits   Following Commands Follows all commands and directions without difficulty   Subjective   Subjective \"I have not been moving as much today\"   RLE Assessment   RLE Assessment   (3+/5)   LLE Assessment   LLE Assessment   (4/5)   Vision-Basic Assessment   Current Vision Wears glasses all the time   Coordination   Sensation WFL   Bed Mobility   Additional " Comments pt in recliner to start and end session   Transfers   Sit to Stand   (cga)   Additional items Increased time required;Armrests   Stand to Sit 5  Supervision   Additional items Assist x 1;Increased time required;Armrests   Ambulation/Elevation   Gait pattern Improper Weight shift;Decreased foot clearance;Antalgic;Decreased R stance   Gait Assistance   (cga)   Additional items Assist x 1;Verbal cues   Assistive Device Rolling walker   Distance 15 ft   Ambulation/Elevation Additional Comments pt initially attempted ambulation with cane and was very unsteady, transitioned to RW and had one LOB which required mod a to recover   Balance   Static Sitting Fair +   Dynamic Sitting Fair   Static Standing Fair -   Dynamic Standing Poor +   Ambulatory Poor +   Activity Tolerance   Activity Tolerance Patient limited by fatigue;Patient limited by pain   Medical Staff Made Aware cm made aware   Nurse Made Aware rn valentin   Assessment   Prognosis Fair   Problem List Decreased strength;Decreased endurance;Impaired balance;Decreased mobility;Pain   Assessment Pt is 79 y.o. male seen for PT evaluation s/p admit to [facility] on 5/7/2025 w/ Sepsis without acute organ dysfunction (HCC). PT consulted to assess pt's functional mobility and d/c needs. Order placed for PT eval and tx, w/ out of bed to chair order. Pt agreeable to PT  session upon arrival, pt found seated OOB in recliner.  PTA, pt was independent w/ all functional mobility w/ cane and lives w/ spouse in 1 level home .  Pt to benefit from continued PT tx to address deficits and maximize level of functional independent mobility and consistency. Upon conclusion pt  seated in recliner, with all needs in reach, and chair alarm intact. Complexity: Comorbidities affecting pt's physical performance at time of assessment include:  AFIB, HTN, foot swelling PAD, sepsis, Right ulcer osteomyelitis . Personal factors affecting pt at time of IE include: limited mobility,  ambulating with assistive device, steps to enter home, and inability to navigate without external assistance. Please find objective findings from PT assessment regarding body systems outlined above with impairments and limitations including weakness, impaired balance, gait deviations, pain, decreased activity tolerance, and fall risk.  Pt's clinical presentation is currently unstable/unpredictable seen in pt's presentation of abnormal H&H, abnormal sodium levels, abnormal blood sugar levels, and pain. The patient's AM-PAC Basic Mobility Inpatient Short Form Raw Score is 17 .  Based on patient presentations and impairments, pt would most appropriately benefit from Level 2 resource intensity upon discharge. A Raw score of greater than 16 suggests the patient may benefit from discharge to post-acute rehabilitation services. Please also refer to the recommendation of the Physical Therapist for safe discharge planning. RN verbalized pt appropriate for PT session.   Goals   Patient Goals to get better   LTG Expiration Date 05/18/25   Long Term Goal #1 Pt will: Perform bed mobility tasks to modified I to increase Indep in home environment and improve ease of bed mobility. Perform transfers to modified I to increase Indep in home environment, decrease risk for falls, and improve ease of transfers. Perform ambulation with least restrictive AD for 75 ft to  increase Indep in home environment, decrease risk for falls, improve activity tolerance, and improve gait quality. Increase dynamic standing balance to F+ to decrease fall risk.   Increase OOB activity tolerance to 10 minutes without s/s of exertion to decrease fall risk.   PT Treatment Day 0   Plan   Treatment/Interventions Functional transfer training;LE strengthening/ROM;Elevations;Therapeutic exercise;Endurance training;Gait training   PT Frequency 3-5x/wk   Discharge Recommendation   Rehab Resource Intensity Level, PT II (Moderate Resource Intensity)   Equipment  Recommended Cane;Walker   AM-PAC Basic Mobility Inpatient   Turning in Flat Bed Without Bedrails 4   Lying on Back to Sitting on Edge of Flat Bed Without Bedrails 4   Moving Bed to Chair 3   Standing Up From Chair Using Arms 3   Walk in Room 2   Climb 3-5 Stairs With Railing 1   Basic Mobility Inpatient Raw Score 17   Basic Mobility Standardized Score 39.67   [facility] Highest Level Of Mobility   -HLM Goal 5: Stand one or more mins   -HLM Achieved 6: Walk 10 steps or more       Time In: 0806  Time Out: 0829  Total Evaluation Minutes: 23    Domenico Robb, PT

## 2025-05-08 NOTE — APP STUDENT NOTE
Progress Note - Internal Medicine   Name: Gold Van 79 y.o. male I MRN: [id number]  Unit/Bed#: -01 I Date of Admission: 5/7/2025   Date of Service: 5/8/2025 I Hospital Day: 1  { ?Quick Links I Problem List I PORCH I Billing Tip:89481}  Assessment & Plan  Sepsis without acute organ dysfunction (HCC)  Patient presents to ED with increased right foot pain. He has a known history of PAD and DM ulcer on right foot.   Sepsis criteria POA: tachycardia, leukocytosis with accompanied chronic right foot ulcer with cellulitis and osteomyelitis   Podiatry and vascular surgery consulted--input appreciated   Will require right femoral enterectomy, prior to podiatric surgery.   Will continue antibiotics prior to surgery for better infection control   Patient will require transfer for [facility] over the weekend as his vascular surgery is scheduled for 5/13  Continue cefazolin and flagyl  Follow cultures  Monitor for fevers   AM CBC for WBC trend.      Type 2 diabetes mellitus with complication, without long-term current use of insulin ([facility])  Lab Results   Component Value Date    HGBA1C 9.3 (H) 04/09/2025       Recent Labs     05/07/25  1557 05/07/25  2055 05/08/25  0719   POCGLU 117 261* 168*       Blood Sugar Average: Last 72 hrs:  (P) 182  Home regimen includes Tradjenta 5 mg daily, 500 mg Metformin daily, 10 mg Glipizide BID  Hold home regimen  Check blood sugars ACHS  Continue SSI       Essential hypertension  Home regimen includes  5 mg Norvsc BID, 25 mg atenolol daily  Continue with hold parameters.   Mild intermittent asthma without complication  On room air  Not in acute exacerbation  Continue Breztri and albuterol as needed.   Pruritus  Chronic pruritis  Managed on 5 mg Prednisone daily   Continue   CVA (cerebrovascular accident) (HCC)  History of CVA in 9/2024-no reported residuals  Continue aspirin, statin   Holding eliquis, continue heparin gtt  Severe peripheral arterial disease (HCC)  4/18 VAS arterial  duplex--bilateral lower limb: RIGHT LOWER LIMB: There is a >75% stenosis noted in the proximal superficial femoral artery and a 50-75% stenosis in the distal superficial femoral artery. Occlusion vs high grade stenosis noted in the distal posterior tibial artery. Diffuse disease with calcification and heavy shadowing throughout the remaining femoro-popliteal and tibio-peroneal segments may obscure more significant stenosis.        LEFT LOWER LIMB: There is occlusion vs high grade stenosis in the posterior tibial and anterior        tibial arteries. Diffuse disease with calcification and heavy shadowing        throughout the remaining femoro-popliteal and tibio-peroneal segments may        obscure more significant stenosis.  5/6 CTA aorta and LE:  Focal high-grade stenosis of distal right CFA and diffuse atherosclerotic disease of right SFA resulting in moderate to severe stenoses with focal near complete occlusions at the proximal and distal segments. 2. Short segment occlusions of proximal right anterior tibial and peroneal arteries with reconstitution in the medial to distal segments. Right posterior tibial artery is occluded. 3. Occluded left SFA with reconstitution in the distal segment. Left anterior tibial and posterior tibial arteries are occluded. One-vessel runoff of the left lower extremity through the peroneal artery. 4. Focal high-grade stenosis at the proximal right renal artery.  Vascular surgery consulted  Plan for right femoral endarterectomy on 5/13 at [facility], patient will be transferred over the weekend   Continue aspirin and heparin infusion, follow up with vascular surgery on timing of holding these medications for surgery.   Continue statin    Ulcer of right foot with fat layer exposed (HCC)  Patient with chronic diabetic foot wound with cellulitis and osteomyelitis.   MRI right foot (4/17) suspect early development of osteomyelitis of the fifth metatarsal head  X-ray right foot (5/7)- Ulceration  along the lateral aspect of the foot and soft tissue swelling along the dorsum of the foot without radiographic findings of osteomyelitis.   Podiatry consulted, input appreciated-patient will likely need a partial fifth ray amputation following revascularization surgery   Continue cefazolin and Flagyl  Continue with local wound care   PAF (paroxysmal atrial fibrillation) (HCC)  EKG on admission showed sinus tachycardia.   Continue home dose of atenolol  Eliquis on hold and patient was started on heparin gtt in anticipation of surgery  Cardiomyopathy (HCC)  Patient follows with Cardiology in the outpatient setting.   Last echo 10/24 showed LVEF 50%, grade 1 diastolic dysfunction   Cardiology was consulted and found patient to be an acceptable risk for surgery.   Not currently on diuretic management  Follow volume status closely  Na restricted diet.     Subjective   Patient seen and examined. No acute events overnight. This AM patient is resting in chair. He offers no complaints. Reports pain in right foot when ambulating.     Objective :{?Quick Links I ICU Summary I Vitals I I/Os I LDAs I Mobility (PT/OT) I Code Status / ACP   ?Quick Links I Active Meds I Pain Meds I Antibiotics I Anticoagulants:63252}  Temp:  [97.5 °F (36.4 °C)-98.5 °F (36.9 °C)] 98 °F (36.7 °C)  HR:  [83-98] 83  BP: (124-157)/(58-78) 143/78  Resp:  [16-20] 18  SpO2:  [93 %-98 %] 97 %  O2 Device: None (Room air)    I/O         05/06 0701  05/07 0700 05/07 0701  05/08 0700 05/08 0701  05/09 0700    P.O.  120     I.V. (mL/kg)  234.3 (3.2)     IV Piggyback  2050     Total Intake(mL/kg)  2404.3 (32.5)     Urine (mL/kg/hr)  1600 650 (3.3)    Total Output  1600 650    Net  +804.3 -650                 Weights:   IBW (Ideal Body Weight): 77.6 kg    Body mass index is 22.1 kg/m².  Weight (last 2 days)       Date/Time Weight    05/07/25 0828 73.9 (162.92)            Physical Exam  Vitals and nursing note reviewed.   Constitutional:       General: He is not in  acute distress.  HENT:      Mouth/Throat:      Mouth: Mucous membranes are moist.   Cardiovascular:      Rate and Rhythm: Normal rate and regular rhythm.      Heart sounds: Normal heart sounds. No murmur heard.  Pulmonary:      Effort: Pulmonary effort is normal.      Breath sounds: Normal breath sounds. No wheezing.   Abdominal:      General: Bowel sounds are normal.      Palpations: Abdomen is soft.   Musculoskeletal:      Right lower le+ Edema present.      Left lower le+ Edema present.   Skin:     General: Skin is warm.      Findings: Wound present.      Comments: Right lateral foot, dressing in place, CDI    Neurological:      General: No focal deficit present.      Mental Status: He is alert.   Psychiatric:         Mood and Affect: Mood normal.         {?Quick Links I Lab Review I Micro Results I Radiology I Cardiology:82648}  Lab Results: I have reviewed the following results:  Recent Labs     25  0900 25  1242 25  1819 25  0425 25  0758   WBC 12.54*  --   --  10.11  --    HGB 11.5*  --   --  10.0*  --    HCT 36.0*  --   --  31.2*  --      --   --  248  --    SODIUM 137  --   --  134*  --    K 3.9  --   --  4.1  --      --   --  104  --    CO2 25  --   --  24  --    BUN 17  --   --  15  --    CREATININE 0.95  --   --  0.93  --    GLUC 155*  --   --  227*  --    AST 9*  --   --   --   --    ALT 10  --   --   --   --    ALB 3.7  --   --   --   --    TBILI 0.31  --   --   --   --    ALKPHOS 73  --   --   --   --    PTT 31  --    < >  --  66*   INR 1.14  --   --   --   --    LACTICACID 2.4* 1.5  --   --   --     < > = values in this interval not displayed.       Imaging Results Review: No pertinent imaging studies reviewed.  Other Study Results Review: No additional pertinent studies reviewed.    Currently Ordered Meds:   Current Facility-Administered Medications:     acetaminophen (TYLENOL) tablet 975 mg, Q8H SANDRA    albuterol (PROVENTIL HFA,VENTOLIN HFA) inhaler  1 puff, Q4H PRN    amLODIPine (NORVASC) tablet 5 mg, BID    atenolol (TENORMIN) tablet 25 mg, Daily    atorvastatin (LIPITOR) tablet 40 mg, QPM    Budeson-Glycopyrrol-Formoterol 160-9-4.8 MCG/ACT AERO 2 puff, BID    ceFAZolin (ANCEF) IVPB (premix in dextrose) 2,000 mg 50 mL, Q8H, Last Rate: 2,000 mg (05/08/25 0621)    famotidine (PEPCID) tablet 20 mg, BID    heparin (porcine) 25,000 units in 0.45% NaCl 250 mL infusion (premix), Titrated, Last Rate: 20 Units/kg/hr (05/08/25 0525)    heparin (porcine) injection 2,800 Units, Q6H PRN    heparin (porcine) injection 5,600 Units, Q6H PRN    insulin lispro (HumALOG/ADMELOG) 100 units/mL subcutaneous injection 1-5 Units, TID AC **AND** Fingerstick Glucose (POCT), TID AC    insulin lispro (HumALOG/ADMELOG) 100 units/mL subcutaneous injection 1-5 Units, HS    lisinopril (ZESTRIL) tablet 20 mg, BID    LORazepam (ATIVAN) tablet 0.5 mg, Q8H PRN    metroNIDAZOLE (FLAGYL) IVPB (premix) 500 mg 100 mL, Q12H, Last Rate: 500 mg (05/08/25 0153)    montelukast (SINGULAIR) tablet 10 mg, HS    morphine injection 2 mg, Q4H PRN    multivitamin-minerals (CENTRUM) tablet 1 tablet, Daily    oxyCODONE (ROXICODONE) IR tablet 5 mg, Q4H PRN **OR** oxyCODONE (ROXICODONE) immediate release tablet 10 mg, Q4H PRN    predniSONE tablet 5 mg, Daily  VTE Pharmacologic Prophylaxis: Heparin  VTE Mechanical Prophylaxis: sequential compression device    Administrative Statements   Portions of the record may have been created with voice recognition software.

## 2025-05-08 NOTE — ASSESSMENT & PLAN NOTE
Patient follows with Cardiology in the outpatient setting.   Last echo 10/24 showed LVEF 50%, grade 1 diastolic dysfunction   Cardiology was consulted and found patient to be an acceptable risk for surgery.   Not currently on diuretic management  Follow volume status closely  Na restricted diet.

## 2025-05-08 NOTE — ASSESSMENT & PLAN NOTE
4/18 VAS arterial duplex--bilateral lower limb: RIGHT LOWER LIMB: There is a >75% stenosis noted in the proximal superficial femoral artery and a 50-75% stenosis in the distal superficial femoral artery. Occlusion vs high grade stenosis noted in the distal posterior tibial artery. Diffuse disease with calcification and heavy shadowing throughout the remaining femoro-popliteal and tibio-peroneal segments may obscure more significant stenosis.        LEFT LOWER LIMB: There is occlusion vs high grade stenosis in the posterior tibial and anterior        tibial arteries. Diffuse disease with calcification and heavy shadowing        throughout the remaining femoro-popliteal and tibio-peroneal segments may        obscure more significant stenosis.  5/6 CTA aorta and LE:  Focal high-grade stenosis of distal right CFA and diffuse atherosclerotic disease of right SFA resulting in moderate to severe stenoses with focal near complete occlusions at the proximal and distal segments. 2. Short segment occlusions of proximal right anterior tibial and peroneal arteries with reconstitution in the medial to distal segments. Right posterior tibial artery is occluded. 3. Occluded left SFA with reconstitution in the distal segment. Left anterior tibial and posterior tibial arteries are occluded. One-vessel runoff of the left lower extremity through the peroneal artery. 4. Focal high-grade stenosis at the proximal right renal artery.  Vascular surgery consulted  Plan for right femoral endarterectomy on 5/13 at [facility], patient will be transferred over the weekend   Continue aspirin and heparin infusion, follow up with vascular surgery on timing of holding these medications for surgery.   Continue statin

## 2025-05-08 NOTE — ASSESSMENT & PLAN NOTE
Patient presents to ED with increased right foot pain. He has a known history of PAD and DM ulcer on right foot.   Sepsis criteria POA: tachycardia, leukocytosis with accompanied chronic right foot ulcer with cellulitis and osteomyelitis   Podiatry and vascular surgery consulted--input appreciated   Will require right femoral enterectomy, prior to podiatric surgery.   Will continue antibiotics prior to surgery for better infection control   Patient will require transfer for SLA over the weekend as his vascular surgery is scheduled for 5/13  Continue cefazolin and flagyl  Follow cultures  Monitor for fevers   AM CBC for WBC trend.

## 2025-05-08 NOTE — PLAN OF CARE
Problem: Potential for Falls  Goal: Patient will remain free of falls  Description: INTERVENTIONS:- Educate patient/family on patient safety including physical limitations- Instruct patient to call for assistance with activity - Consult OT/PT to assist with strengthening/mobility - Keep Call bell within reach- Keep bed low and locked with side rails adjusted as appropriate- Keep care items and personal belongings within reach- Initiate and maintain comfort rounds- Make Fall Risk Sign visible to staff- Offer Toileting every 2 Hours, in advance of need- Initiate/Maintain bed alarm- Obtain necessary fall risk management equipment: bed alarm- Apply yellow socks and bracelet for high fall risk patients- Consider moving patient to room near nurses station  INTERVENTIONS:- Educate patient/family on patient safety including physical limitations- Instruct patient to call for assistance with activity - Consult OT/PT to assist with strengthening/mobility - Keep Call bell within reach- Keep bed low and locked with side rails adjusted as appropriate- Keep care items and personal belongings within reach- Initiate and maintain comfort rounds-Outcome: Progressing

## 2025-05-08 NOTE — ASSESSMENT & PLAN NOTE
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   Podiatry input appreciated-will likely need a partial fifth ray amputation following revascularization   Continue on cefazolin and Flagyl  Continue with local wound care

## 2025-05-08 NOTE — PROGRESS NOTES
"Progress Note - Hospitalist   Name: Gold Van 79 y.o. male I MRN: [id number]  Unit/Bed#: -01 I Date of Admission: 5/7/2025   Date of Service: 5/8/2025 I Hospital Day: 1    Assessment & Plan  Sepsis without acute organ dysfunction (HCC)  The patient presented with increasing pain on the right foot with history of severe peripheral vascular disease and DM ulcer wound on the right foot   The patient meets sepsis criteria with tachycardia and leukocytosis with underlying right chronic diabetic ulcer wound with cellulitis and osteomyelitis  Podiatry and vascular surgery input appreciated  Will require vascular intervention- a femoral endarterectomy with possible bypass. This is scheduled for 5/13 at [facility]  prior to podiatric surgery.   Started on cefazolin and Flagyl  Follow-up with cultures  Monitor fever trends/WBC    Severe peripheral arterial disease (HCC)  LEADs 4/18-   Right-50 to 75% stenosis in the distal superficial femoral artery.  Occlusion versus high-grade stenosis in the distal posterior tibial artery.  Great toe pressure is below the healing range  Left-there is occlusive versus high-grade stenosis in the posterior tibial and anterior tibial arteries. Great toe pressure is below the healing range for diabetic patient.  Compared to 7/3/2023-continue stenosis and occlusion on the right.  New occlusion of the left.  Significant decrease of right RIC.  CTA (5/6) \"Focal high-grade stenosis of distal right CFA and diffuse atherosclerotic disease of right SFA resulting in moderate to severe stenoses with focal near complete occlusions at the proximal and distal segments. Short segment occlusions of proximal right anterior tibial and peroneal arteries with reconstitution in the medial to distal segments. Right posterior tibial artery is occluded. Occluded left SFA with reconstitution in the distal segment. Left anterior tibial and posterior tibial arteries are occluded. One-vessel runoff of the " "left lower extremity through the peroneal artery. Focal high-grade stenosis at the proximal right renal artery.\"  Vascular surgery input appreciated   The patient will required a femoral endarterectomy with possible bypass. This is scheduled for 5/13 at [facility] [place].  Continue statin, aspirin, heparin infusion  Will initiate transfer on Sunday/Monday.   Type 2 diabetes mellitus with complication, without long-term current use of insulin (HCC)  Lab Results   Component Value Date    HGBA1C 9.3 (H) 04/09/2025       Recent Labs     05/07/25  1557 05/07/25 2055 05/08/25  0719   POCGLU 117 261* 168*       Blood Sugar Average: Last 72 hrs:  (P) 182  Hold home regimen including glipizide, linagliptin and metformin  Placed on SSI  May need a long-acting insulin in the setting of chronic steroid use  Restricted carbohydrate diet  Hypoglycemia protocol      Essential hypertension  Home regimen amlodipine 5 mg daily, atenolol 25 mg daily, and lisinopril 20 mg twice daily  Continue with current regimen at this time  Holding parameter, SBP less than 130 in the setting of sepsis  Continue to monitor blood pressure closely and adjust medication as indicated    Mild intermittent asthma without complication  In no acute exacerbation  Continue with Breztri and albuterol as needed   Pruritus  Chronic pruritus  Has been maintained on prednisone 5 mg daily  Will continue    CVA (cerebrovascular accident) (HCC)  History of CVA 9/2024  Continue stroke prevention with statin, aspirin  Hold Eliquis for now while waiting for vascular procedure   Continue with heparin infusion   Ulcer of right foot with fat layer exposed (HCC)  Chronic diabetic foot ulcer with cellulitis and osteomyelitis  MRI right foot (4/17) suspect early development of osteomyelitis of the fifth metatarsal head  X-ray right foot (5/7)- Ulceration along the lateral aspect of the foot and soft tissue swelling along the dorsum of the foot without radiographic findings of " osteomyelitis.   Podiatry input appreciated-will likely need a partial fifth ray amputation following revascularization   Continue on cefazolin and Flagyl  Continue with local wound care     PAF (paroxysmal atrial fibrillation) ([facility])  Currently in sinus rhythm  Urology input appreciated  Continue atenolol 25 mg daily for rate control  Hold Eliquis for now, currently on heparin infusion     Cardiomyopathy ([facility])  Primary cardiologist- Dr. Ho  LVEF of 50% on TTE 10/2024  Cardiology input appreciated-patient is appropriate risk to proceed with surgery  Continue medical management   Currently not on diuretic therapy   Monitor volume status close, cautious with IV fluids      VTE Pharmacologic Prophylaxis: VTE Score: 5 High Risk (Score >/= 5) - Pharmacological DVT Prophylaxis Ordered: heparin drip. Sequential Compression Devices Ordered.    Mobility:   Basic Mobility Inpatient Raw Score: 19  JH-HLM Goal: 6: Walk 10 steps or more  JH-HLM Achieved: 6: Walk 10 steps or more  JH-HLM Goal NOT achieved. Continue with multidisciplinary rounding and encourage appropriate mobility to improve upon JH-HLM goals.    Patient Centered Rounds: I performed bedside rounds with nursing staff today.   Discussions with Specialists or Other Care Team Provider: vascular surgery, podiatry, CM, OT/PT     Education and Discussions with Family / Patient:  patient was updated.     Current Length of Stay: 1 day(s)  Current Patient Status: Inpatient   Certification Statement: The patient will continue to require additional inpatient hospital stay due to sepsis secondary to chronic diabetic ulcer wound and osteomyelitis  Discharge Plan: Anticipate discharge in 48-72 hrs to will need to be transferred to [facility] for vascular procedure scheduled for 5/13    Code Status: Level 1 - Full Code    Subjective   The patient was seen and examined.  He states that he is feeling okay.  He complains of some pain on the right foot.  Discussed with the  patient regarding his transfer to [facility] which will likely take place at the end of this weekend for his procedure which is scheduled for 5/13. He verbalized understanding.     Objective :  Temp:  [97.5 °F (36.4 °C)-98.5 °F (36.9 °C)] 98 °F (36.7 °C)  HR:  [83-98] 83  BP: (124-157)/(60-78) 143/78  Resp:  [17-20] 18  SpO2:  [93 %-97 %] 97 %  O2 Device: None (Room air)    Body mass index is 22.1 kg/m².     Input and Output Summary (last 24 hours):     Intake/Output Summary (Last 24 hours) at 5/8/2025 1056  Last data filed at 5/8/2025 0800  Gross per 24 hour   Intake 1654.27 ml   Output 2250 ml   Net -595.73 ml       Physical Exam  Vitals and nursing note reviewed.   Constitutional:       General: He is not in acute distress.     Appearance: Normal appearance.      Comments: Elderly      HENT:      Head: Normocephalic and atraumatic.      Right Ear: External ear normal.      Left Ear: External ear normal.      Nose: Nose normal.      Mouth/Throat:      Mouth: Mucous membranes are moist.      Pharynx: Oropharynx is clear.   Eyes:      General:         Right eye: No discharge.         Left eye: No discharge.      Extraocular Movements: Extraocular movements intact.      Pupils: Pupils are equal, round, and reactive to light.   Cardiovascular:      Rate and Rhythm: Regular rhythm. Tachycardia present.      Pulses: Normal pulses.      Heart sounds: Normal heart sounds. No murmur heard.  Pulmonary:      Effort: Pulmonary effort is normal. No respiratory distress.      Breath sounds: Normal breath sounds.   Abdominal:      General: Bowel sounds are normal. There is no distension.      Palpations: Abdomen is soft. There is no mass.      Tenderness: There is no abdominal tenderness.   Musculoskeletal:         General: Tenderness present. No swelling or deformity. Normal range of motion.      Cervical back: Normal range of motion and neck supple. No rigidity.   Skin:     General: Skin is warm and dry.       Capillary Refill: Capillary refill takes less than 2 seconds.      Coloration: Skin is not pale.      Findings: Erythema present.      Comments: Right foot chronic ulcer wound with surrounding erythema on the lateral aspect of the foot. Dry. Slightly tender to palpation.    Neurological:      General: No focal deficit present.      Mental Status: He is alert and oriented to person, place, and time. Mental status is at baseline.   Psychiatric:         Mood and Affect: Mood normal.         Behavior: Behavior normal.         Thought Content: Thought content normal.         Judgment: Judgment normal.       Lines/Drains:              Lab Results: I have reviewed the following results:   Results from last 7 days   Lab Units 05/08/25 0425 05/07/25 0900   WBC Thousand/uL 10.11 12.54*   HEMOGLOBIN g/dL 10.0* 11.5*   HEMATOCRIT % 31.2* 36.0*   PLATELETS Thousands/uL 248 310   SEGS PCT %  --  75   LYMPHO PCT %  --  14   MONO PCT %  --  8   EOS PCT %  --  2     Results from last 7 days   Lab Units 05/08/25  0425 05/07/25  0900   SODIUM mmol/L 134* 137   POTASSIUM mmol/L 4.1 3.9   CHLORIDE mmol/L 104 102   CO2 mmol/L 24 25   BUN mg/dL 15 17   CREATININE mg/dL 0.93 0.95   ANION GAP mmol/L 6 10   CALCIUM mg/dL 8.5 9.4   ALBUMIN g/dL  --  3.7   TOTAL BILIRUBIN mg/dL  --  0.31   ALK PHOS U/L  --  73   ALT U/L  --  10   AST U/L  --  9*   GLUCOSE RANDOM mg/dL 227* 155*     Results from last 7 days   Lab Units 05/07/25  0900   INR  1.14     Results from last 7 days   Lab Units 05/08/25  0719 05/07/25  2055 05/07/25  1557   POC GLUCOSE mg/dl 168* 261* 117         Results from last 7 days   Lab Units 05/08/25  0425 05/07/25  1242 05/07/25  0900   LACTIC ACID mmol/L  --  1.5 2.4*   PROCALCITONIN ng/ml 0.11  --  0.08       Recent Cultures (last 7 days):   Results from last 7 days   Lab Units 05/07/25  0900   BLOOD CULTURE  Received in Microbiology Lab. Culture in Progress.  Received in Microbiology Lab. Culture in Progress.        Imaging Results Review: I reviewed radiology reports from this admission including: vein mapping.  Other Study Results Review: No additional pertinent studies reviewed.    Last 24 Hours Medication List:     Current Facility-Administered Medications:     acetaminophen (TYLENOL) tablet 975 mg, Q8H SANDRA    albuterol (PROVENTIL HFA,VENTOLIN HFA) inhaler 1 puff, Q4H PRN    amLODIPine (NORVASC) tablet 5 mg, BID    atenolol (TENORMIN) tablet 25 mg, Daily    atorvastatin (LIPITOR) tablet 40 mg, QPM    Budeson-Glycopyrrol-Formoterol 160-9-4.8 MCG/ACT AERO 2 puff, BID    ceFAZolin (ANCEF) IVPB (premix in dextrose) 2,000 mg 50 mL, Q8H, Last Rate: 2,000 mg (05/08/25 0621)    famotidine (PEPCID) tablet 20 mg, BID    heparin (porcine) 25,000 units in 0.45% NaCl 250 mL infusion (premix), Titrated, Last Rate: 20 Units/kg/hr (05/08/25 0525)    heparin (porcine) injection 2,800 Units, Q6H PRN    heparin (porcine) injection 5,600 Units, Q6H PRN    insulin lispro (HumALOG/ADMELOG) 100 units/mL subcutaneous injection 1-5 Units, TID AC **AND** Fingerstick Glucose (POCT), TID AC    insulin lispro (HumALOG/ADMELOG) 100 units/mL subcutaneous injection 1-5 Units, HS    lisinopril (ZESTRIL) tablet 20 mg, BID    LORazepam (ATIVAN) tablet 0.5 mg, Q8H PRN    metroNIDAZOLE (FLAGYL) IVPB (premix) 500 mg 100 mL, Q12H, Last Rate: 500 mg (05/08/25 0153)    montelukast (SINGULAIR) tablet 10 mg, HS    morphine injection 2 mg, Q4H PRN    multivitamin-minerals (CENTRUM) tablet 1 tablet, Daily    oxyCODONE (ROXICODONE) IR tablet 5 mg, Q4H PRN **OR** oxyCODONE (ROXICODONE) immediate release tablet 10 mg, Q4H PRN    predniSONE tablet 5 mg, Daily    Administrative Statements   Today, Patient Was Seen By: BELGICA Hyde  I have spent a total time of 43 minutes in caring for this patient on the day of the visit/encounter including Diagnostic results, Prognosis, Risks and benefits of tx options, Instructions for management, Patient and family  education, Importance of tx compliance, Risk factor reductions, Impressions, Counseling / Coordination of care, Documenting in the medical record, Reviewing/placing orders in the medical record (including tests, medications, and/or procedures), Obtaining or reviewing history  , and Communicating with other healthcare professionals .    **Please Note: This note may have been constructed using a voice recognition system.**

## 2025-05-08 NOTE — PLAN OF CARE
Problem: PAIN - ADULT  Goal: Verbalizes/displays adequate comfort level or baseline comfort level  Description: Interventions:- Encourage patient to monitor pain and request assistance- Assess pain using appropriate pain scale- Administer analgesics based on type and severity of pain and evaluate response- Implement non-pharmacological measures as appropriate and evaluate response- Consider cultural and social influences on pain and pain management- Notify physician/advanced practitioner if interventions unsuccessful or patient reports new pain  Outcome: Progressing     Problem: INFECTION - ADULT  Goal: Absence or prevention of progression during hospitalization  Description: INTERVENTIONS:- Assess and monitor for signs and symptoms of infection- Monitor lab/diagnostic results- Monitor all insertion sites, i.e. indwelling lines, tubes, and drains- Monitor endotracheal if appropriate and nasal secretions for changes in amount and color- [place] appropriate cooling/warming therapies per order- Administer medications as ordered- Instruct and encourage patient and family to use good hand hygiene technique- Identify and instruct in appropriate isolation precautions for identified infection/condition  Outcome: Progressing

## 2025-05-08 NOTE — ASSESSMENT & PLAN NOTE
"80yo male PMH afib on eliquis, stroke 9/2024, HTN, DM, HLD presents with infected right foot wound. Vascular consulted to optimized blood flow.    -LEADs 4/18: \"RIGHT LOWER LIMB:  There is a >75% stenosis noted in the proximal superficial femoral artery and a  50-75% stenosis in the distal superficial femoral artery. Occlusion vs high  grade stenosis noted in the distal posterior tibial artery. Diffuse disease with  calcification and heavy shadowing throughout the remaining femoro-popliteal and  tibio-peroneal segments may obscure more significant stenosis.  Ankle/Brachial index: 0.42 which is in the ischemic disease category (Prior  0.83)  PVR/ PPG tracings are dampened.  Metatarsal pressure of  15 mmHg  Great toe pressure of  16 mmHg, below the healing range     LEFT LOWER LIMB:  There is occlusion vs high grade stenosis in the posterior tibial and anterior  tibial arteries. Diffuse disease with calcification and heavy shadowing  throughout the remaining femoro-popliteal and tibio-peroneal segments may  obscure more significant stenosis.  Ankle/Brachial index:   1.06 which is in the normal category (Prior 1.03)  PVR/ PPG tracings are dampened.  Metatarsal pressure of  85 mmHg  Great toe pressure of  45 mmHg, below the healing range for a diabetic patient     Compared to previous study on 7/3/2023 , there is new stenosis and occlusion on  the right. New occlusion of the left. Significant decrease of right RIC.  Bilateral toe pressure now below healing range.\"   -CTA 5/6 final read pending   -right foot wound on the lateral aspect of the foot, sensation/motor intact; nonpalpable R PT, 1+ DP, 2+ femoral   -afebrile, tachycardic   -WBC 10 (12.5)   -Hgb stable   -Cr wnl    Plan:  -patient scheduled for right femoral endarterectomy with Dr Aiken 5/13 at [facility]; patient can be transferred over the weekend once bed is available  -okay for regular diet  -glucose control  -podiatry and cardiology consults, appreciate " recs  -vein mapping complete  -IV abx, IVF  -local wound care to right foot per podiatry  -hold eliquis and start heparin drip  -discussed with on call vascular surgeon Dr Greene; final recs pending attending attestation

## 2025-05-08 NOTE — UTILIZATION REVIEW
Initial Clinical Review    Admission: Date/Time/Statement:   Admission Orders (From admission, onward)       Ordered        05/07/25 1144  INPATIENT ADMISSION  Once                          Orders Placed This Encounter   Procedures    INPATIENT ADMISSION     Standing Status:   Standing     Number of Occurrences:   1     Level of Care:   Med Surg [16]     Estimated length of stay:   More than 2 Midnights     Certification:   I certify that inpatient services are medically necessary for this patient for a duration of greater than two midnights. See H&P and MD Progress Notes for additional information about the patient's course of treatment.     ED Arrival Information       Expected   -    Arrival   5/7/2025 08:24    Acuity   Urgent              Means of arrival   Walk-In    Escorted by   Spouse    Service   Hospitalist    Admission type   Emergency              Arrival complaint   foot infection             Chief Complaint   Patient presents with    Foot Swelling     Pt has a right foot wound that has been treated by wound care and podiatry. Pt reports problem with blood flow        Initial Presentation: 79 y.o. male presents to ED from home  with worsening pain  right foot with erythema.  Seen by podiatry and  ED recommended.  Denies  fever or chills.  Met sepsis criteria in ED with tachycardia  and leukocytosis.  PMH  is  CVA,  CM, EF  50 %, HTN, Afib  on  Eliquis,   asthma, DM, chronic diabetic foot wound  and severe  PVD.  X ray R foot shows no osteo.  Admit  Ip with  Sepsis,  Severe  PAD,  Ulcer right foot and plan is  TRISTAN< local wound care, podaitry/vascular consults,  F/U  cultures, and  plan transfer to [facility], date  TBD.    Vascular consult  Needs  femoral endarterectomy  when infection clears.    Cardiology  consult  Plan to hold Eliquis  2 days  prior to surgery  and aspirin  5 days  prior to surgery.  Proceed with surgery when cleared  by vascular.    Podiatry consult  Continue  TRISTAN and local  wound care for now.              Anticipated Length of Stay/Certification Statement: Patient will be admitted on an inpatient basis with an anticipated length of stay of greater than 2 midnights secondary to sepsis secondary to right foot cellulitis and possible early development of osteomyelitis.       Date:    5/8   Day 2:   Scheduled for right femoral endarterectomy  5/13  at [facility].  Can transfer  over the weekend.  Continue  TRISTAN and IVF.  Start  IV heparin  and hold eliquis.  Some improvement in right foot pain, still with some  j olts of pain.     Date:    5/9  Day 3: Has surpassed a 2nd midnight with active treatments and services.   Continue  PT/OT.    Plan  femoral endarterectomy, possible bypass  5/13  at [facility].    Remains  on  TRISTAN.  Remains on  IV  heparin.  Foot  pain  somewhat improved.    ED Treatment-Medication Administration from 05/07/2025 0822 to 05/07/2025 1210         Date/Time Order Dose Route Action     05/07/2025 0904 sodium chloride 0.9 % bolus 1,000 mL 1,000 mL Intravenous New Bag     05/07/2025 0941 sodium chloride 0.9 % bolus 1,000 mL 1,000 mL Intravenous New Bag     05/07/2025 0901 ceftriaxone (ROCEPHIN) 2 g/50 mL in dextrose IVPB 2,000 mg Intravenous New Bag     05/07/2025 0900 HYDROmorphone HCl (DILAUDID) injection 0.2 mg 0.2 mg Intravenous Given     05/07/2025 0941 sodium chloride 0.9 % bolus 1,000 mL 1,000 mL Intravenous New Bag     05/07/2025 1153 heparin (porcine) injection 5,600 Units 5,600 Units Intravenous Given     05/07/2025 1154 heparin (porcine) 25,000 units in 0.45% NaCl 250 mL infusion (premix) 18 Units/kg/hr Intravenous New Bag            Scheduled Medications:  acetaminophen, 975 mg, Oral, Q8H SANDRA  amLODIPine, 5 mg, Oral, BID  atenolol, 25 mg, Oral, Daily  atorvastatin, 40 mg, Oral, QPM  Budeson-Glycopyrrol-Formoterol, 2 puff, Inhalation, BID  cefazolin, 2,000 mg, Intravenous, Q8H  famotidine, 20 mg, Oral, BID  insulin lispro, 1-5 Units, Subcutaneous,  TID AC  insulin lispro, 1-5 Units, Subcutaneous, HS  lisinopril, 20 mg, Oral, BID  metroNIDAZOLE, 500 mg, Intravenous, Q12H  montelukast, 10 mg, Oral, HS  multivitamin-minerals, 1 tablet, Oral, Daily  predniSONE, 5 mg, Oral, Daily      Continuous IV Infusions:  heparin (porcine), 3-30 Units/kg/hr (Order-Specific), Intravenous, Titrated      PRN Meds:  albuterol, 1 puff, Inhalation, Q4H PRN  heparin (porcine), 2,800 Units, Intravenous, Q6H PRN  heparin (porcine), 5,600 Units, Intravenous, Q6H PRN  LORazepam, 0.5 mg, Oral, Q8H PRN  morphine injection, 2 mg, Intravenous, Q4H PRN  oxyCODONE, 5 mg, Oral, Q4H PRN   Or  oxyCODONE, 10 mg, Oral, Q4H PRN      ED Triage Vitals [05/07/25 0828]   Temperature Pulse Respirations Blood Pressure SpO2 Pain Score   97.8 °F (36.6 °C) (!) 115 19 161/80 99 % 8     Weight (last 2 days)       Date/Time Weight    05/07/25 0828 73.9 (162.92)            Vital Signs (last 3 days)       Date/Time Temp Pulse Resp BP MAP (mmHg) SpO2 O2 Device Patient Position - Orthostatic VS [place] Coma Scale Score Pain    05/08/25 0816 -- -- -- -- -- -- -- -- -- 8 05/08/25 07:20:16 98 °F (36.7 °C) 83 -- 143/78 100 97 % -- -- -- --    05/08/25 0549 -- -- -- -- -- -- -- -- -- 8    05/07/25 2316 -- -- -- -- -- 95 % None (Room air) -- 15 8    05/07/25 22:32:58 97.5 °F (36.4 °C) 87 17 137/75 96 97 % -- -- -- --    05/07/25 21:29:35 -- 89 -- 126/65 85 93 % -- -- -- --    05/07/25 2129 -- -- -- -- -- -- -- -- -- 8    05/07/25 1552 -- -- -- -- -- -- -- -- -- 9    05/07/25 15:03:42 98.5 °F (36.9 °C) 88 -- 126/64 85 96 % -- -- -- --    05/07/25 12:12:58 97.5 °F (36.4 °C) 93 -- 157/76 103 94 % -- -- -- --    05/07/25 1200 -- 91 -- 124/70 89 95 % -- -- -- --    05/07/25 1159 98.5 °F (36.9 °C) -- -- -- -- -- -- -- -- --    05/07/25 1145 -- 94 18 124/60 86 94 % None (Room air) -- -- --    05/07/25 1100 -- 98 20 136/73 100 93 % None (Room air) Sitting -- --    05/07/25 1045 -- 96 19 140/77 102 95 % None (Room air) -- --  --    05/07/25 1000 -- 88 16 130/58 87 97 % None (Room air) Sitting -- --    05/07/25 0945 -- 93 17 152/74 105 98 % None (Room air) Sitting -- --    05/07/25 0930 -- -- -- -- -- -- -- -- -- 4    05/07/25 0915 -- 94 16 150/73 104 96 % None (Room air) Sitting -- --    05/07/25 0900 -- 102 20 151/70 101 96 % None (Room air) Sitting -- 8    05/07/25 0846 -- -- -- -- -- -- -- -- 15 --    05/07/25 0829 -- -- -- -- -- 99 % -- -- -- --    05/07/25 0828 97.8 °F (36.6 °C) 115 19 161/80 112 99 % None (Room air) Sitting -- 8              Pertinent Labs/Diagnostic Test Results:   Radiology:  VAS VEIN MAPPING- LOWER EXTREMITY   Final Interpretation by Jose G Galicia DO (05/07 1447)      XR foot 3+ views RIGHT   Final Interpretation by Campbell Castaneda MD (05/07 1254)      Ulceration along the lateral aspect of the foot and soft tissue swelling along the dorsum of the foot without radiographic findings of osteomyelitis.         Computerized Assisted Algorithm (CAA) may have been used to analyze all applicable images.         Workstation performed: [id number]         VAS VENOUS DUPLEX -LOWER LIMB UNILATERAL   Final Interpretation by Sid Andrade MD (05/07 1054)        Cardiology:    GI:      Results from last 7 days   Lab Units 05/08/25  0425 05/07/25  0900   WBC Thousand/uL 10.11 12.54*   HEMOGLOBIN g/dL 10.0* 11.5*   HEMATOCRIT % 31.2* 36.0*   PLATELETS Thousands/uL 248 310   TOTAL NEUT ABS Thousands/µL  --  9.43*         Results from last 7 days   Lab Units 05/08/25  0425 05/07/25  0900   SODIUM mmol/L 134* 137   POTASSIUM mmol/L 4.1 3.9   CHLORIDE mmol/L 104 102   CO2 mmol/L 24 25   ANION GAP mmol/L 6 10   BUN mg/dL 15 17   CREATININE mg/dL 0.93 0.95   EGFR ml/min/1.73sq m 77 75   CALCIUM mg/dL 8.5 9.4     Results from last 7 days   Lab Units 05/07/25  0900   AST U/L 9*   ALT U/L 10   ALK PHOS U/L 73   TOTAL PROTEIN g/dL 7.1   ALBUMIN g/dL 3.7   TOTAL BILIRUBIN mg/dL 0.31     Results from last 7 days   Lab  Units 05/08/25  0719 05/07/25  2055 05/07/25  1557   POC GLUCOSE mg/dl 168* 261* 117     Results from last 7 days   Lab Units 05/08/25  0425 05/07/25  0900   GLUCOSE RANDOM mg/dL 227* 155*               Results from last 7 days   Lab Units 05/08/25  0758 05/08/25  0145 05/07/25  1819 05/07/25  0900   PROTIME seconds  --   --   --  15.1*   INR   --   --   --  1.14   PTT seconds 66* 68* 49* 31         Results from last 7 days   Lab Units 05/08/25  0425 05/07/25  0900   PROCALCITONIN ng/ml 0.11 0.08     Results from last 7 days   Lab Units 05/07/25  1242 05/07/25  0900   LACTIC ACID mmol/L 1.5 2.4*               Results from last 7 days   Lab Units 05/07/25  1144   CLARITY UA  Clear   COLOR UA  Yellow   SPEC GRAV UA  1.010   PH UA  6.0   GLUCOSE UA mg/dl Negative   KETONES UA mg/dl Negative   BLOOD UA  1+*   PROTEIN UA mg/dl Trace*   NITRITE UA  Negative   BILIRUBIN UA  Negative   UROBILINOGEN UA E.U./dl 0.2   LEUKOCYTES UA  Negative   WBC UA /hpf 0-1   RBC UA /hpf 1-2   BACTERIA UA /hpf None Seen   EPITHELIAL CELLS WET PREP /hpf Occasional                                 Results from last 7 days   Lab Units 05/07/25  0900   BLOOD CULTURE  Received in Microbiology Lab. Culture in Progress.  Received in Microbiology Lab. Culture in Progress.               Present on Admission:   Ulcer of right foot with fat layer exposed ([facility])   Type 2 diabetes mellitus with complication, without long-term current use of insulin ([facility])   Essential hypertension   CVA (cerebrovascular accident) ([facility])   PAF (paroxysmal atrial fibrillation) ([facility])   Abnormal echocardiogram   Pruritus   Severe peripheral arterial disease ([facility])   Mild intermittent asthma without complication   Cardiomyopathy ([facility])      Admitting Diagnosis: PAF (paroxysmal atrial fibrillation) ([facility]) [I48.0]  Essential hypertension [I10]  Foot swelling [M79.89]  Peripheral arterial disease ([facility]) [I73.9]  Right foot infection [L08.9]  Sepsis ([facility]) [A41.9]  Ulcer of right foot  with fat layer exposed (HCC) [L97.512]  Age/Sex: 79 y.o. male    Network Utilization Review Department  ATTENTION: Please call with any questions or concerns to [phone] and carefully listen to the prompts so that you are directed to the right person. All voicemails are confidential.   For Discharge needs, contact Care Management DC Support Team at [phone] opt. [phone]  Send all requests for admission clinical reviews, approved or denied determinations and any other requests to dedicated fax number below belonging to the campus where the patient is receiving treatment. List of dedicated fax numbers for the Facilities:  FACILITY NAME UR FAX NUMBER   ADMISSION DENIALS (Administrative/Medical Necessity) [phone]   DISCHARGE SUPPORT TEAM (NETWORK) [phone]   PARENT CHILD HEALTH (Maternity/NICU/Pediatrics) [phone]   General acute hospital [phone]   [facility] [phone]   [facility] [phone]   [facility] [phone]   [facility] [phone]   [facility] [phone]   [facility] [phone]   [facility] [phone]   [facility] [phone]   [facility] [phone]   [facility] [phone]   [facility] [phone]

## 2025-05-08 NOTE — ASSESSMENT & PLAN NOTE
The patient presented with increasing pain on the right foot with history of severe peripheral vascular disease and DM ulcer wound on the right foot   The patient meets sepsis criteria with tachycardia and leukocytosis with underlying right chronic diabetic ulcer wound with cellulitis and osteomyelitis  Podiatry and vascular surgery input appreciated  Will require vascular intervention- a femoral endarterectomy with possible bypass. This is scheduled for 5/13 at [facility]  prior to podiatric surgery.   Started on cefazolin and Flagyl  Follow-up with cultures  Monitor fever trends/WBC

## 2025-05-09 LAB
ANION GAP SERPL CALCULATED.3IONS-SCNC: 7 MMOL/L (ref 4–13)
APTT PPP: 46 SECONDS (ref 23–34)
APTT PPP: 53 SECONDS (ref 23–34)
APTT PPP: 62 SECONDS (ref 23–34)
BUN SERPL-MCNC: 15 MG/DL (ref 5–25)
CALCIUM SERPL-MCNC: 8.9 MG/DL (ref 8.4–10.2)
CHLORIDE SERPL-SCNC: 104 MMOL/L (ref 96–108)
CO2 SERPL-SCNC: 24 MMOL/L (ref 21–32)
CREAT SERPL-MCNC: 0.91 MG/DL (ref 0.6–1.3)
ERYTHROCYTE [DISTWIDTH] IN BLOOD BY AUTOMATED COUNT: 13.4 % (ref 11.6–15.1)
GFR SERPL CREATININE-BSD FRML MDRD: 79 ML/MIN/1.73SQ M
GLUCOSE SERPL-MCNC: 165 MG/DL (ref 65–140)
GLUCOSE SERPL-MCNC: 175 MG/DL (ref 65–140)
GLUCOSE SERPL-MCNC: 219 MG/DL (ref 65–140)
GLUCOSE SERPL-MCNC: 271 MG/DL (ref 65–140)
GLUCOSE SERPL-MCNC: 411 MG/DL (ref 65–140)
HCT VFR BLD AUTO: 32.1 % (ref 36.5–49.3)
HGB BLD-MCNC: 10.4 G/DL (ref 12–17)
MCH RBC QN AUTO: 28.9 PG (ref 26.8–34.3)
MCHC RBC AUTO-ENTMCNC: 32.4 G/DL (ref 31.4–37.4)
MCV RBC AUTO: 89 FL (ref 82–98)
PLATELET # BLD AUTO: 257 THOUSANDS/UL (ref 149–390)
PMV BLD AUTO: 9.4 FL (ref 8.9–12.7)
POTASSIUM SERPL-SCNC: 4 MMOL/L (ref 3.5–5.3)
RBC # BLD AUTO: 3.6 MILLION/UL (ref 3.88–5.62)
SODIUM SERPL-SCNC: 135 MMOL/L (ref 135–147)
WBC # BLD AUTO: 9 THOUSAND/UL (ref 4.31–10.16)

## 2025-05-09 PROCEDURE — 85027 COMPLETE CBC AUTOMATED: CPT | Performed by: NURSE PRACTITIONER

## 2025-05-09 PROCEDURE — 82948 REAGENT STRIP/BLOOD GLUCOSE: CPT

## 2025-05-09 PROCEDURE — 99232 SBSQ HOSP IP/OBS MODERATE 35: CPT

## 2025-05-09 PROCEDURE — 97116 GAIT TRAINING THERAPY: CPT

## 2025-05-09 PROCEDURE — 85730 THROMBOPLASTIN TIME PARTIAL: CPT

## 2025-05-09 PROCEDURE — 97110 THERAPEUTIC EXERCISES: CPT

## 2025-05-09 PROCEDURE — 85730 THROMBOPLASTIN TIME PARTIAL: CPT | Performed by: NURSE PRACTITIONER

## 2025-05-09 PROCEDURE — 80048 BASIC METABOLIC PNL TOTAL CA: CPT | Performed by: NURSE PRACTITIONER

## 2025-05-09 PROCEDURE — 99232 SBSQ HOSP IP/OBS MODERATE 35: CPT | Performed by: SURGERY

## 2025-05-09 PROCEDURE — 97166 OT EVAL MOD COMPLEX 45 MIN: CPT

## 2025-05-09 PROCEDURE — 85730 THROMBOPLASTIN TIME PARTIAL: CPT | Performed by: INTERNAL MEDICINE

## 2025-05-09 RX ADMIN — MONTELUKAST 10 MG: 10 TABLET, FILM COATED ORAL at 22:18

## 2025-05-09 RX ADMIN — CEFAZOLIN SODIUM 2000 MG: 2 SOLUTION INTRAVENOUS at 13:46

## 2025-05-09 RX ADMIN — OXYCODONE HYDROCHLORIDE 10 MG: 10 TABLET ORAL at 20:11

## 2025-05-09 RX ADMIN — HEPARIN SODIUM 2800 UNITS: 1000 INJECTION, SOLUTION INTRAVENOUS; SUBCUTANEOUS at 06:33

## 2025-05-09 RX ADMIN — OXYCODONE HYDROCHLORIDE 5 MG: 5 TABLET ORAL at 09:19

## 2025-05-09 RX ADMIN — HEPARIN SODIUM 2800 UNITS: 1000 INJECTION, SOLUTION INTRAVENOUS; SUBCUTANEOUS at 13:46

## 2025-05-09 RX ADMIN — INSULIN LISPRO 2 UNITS: 100 INJECTION, SOLUTION INTRAVENOUS; SUBCUTANEOUS at 11:46

## 2025-05-09 RX ADMIN — LISINOPRIL 20 MG: 20 TABLET ORAL at 20:12

## 2025-05-09 RX ADMIN — OXYCODONE HYDROCHLORIDE 5 MG: 5 TABLET ORAL at 13:45

## 2025-05-09 RX ADMIN — ACETAMINOPHEN 975 MG: 325 TABLET ORAL at 05:44

## 2025-05-09 RX ADMIN — INSULIN LISPRO 5 UNITS: 100 INJECTION, SOLUTION INTRAVENOUS; SUBCUTANEOUS at 16:40

## 2025-05-09 RX ADMIN — BUDESONIDE, GLYCOPYRROLATE, AND FORMOTEROL FUMARATE 2 PUFF: 160; 9; 4.8 AEROSOL, METERED RESPIRATORY (INHALATION) at 09:09

## 2025-05-09 RX ADMIN — BUDESONIDE, GLYCOPYRROLATE, AND FORMOTEROL FUMARATE 2 PUFF: 160; 9; 4.8 AEROSOL, METERED RESPIRATORY (INHALATION) at 20:16

## 2025-05-09 RX ADMIN — ACETAMINOPHEN 975 MG: 325 TABLET ORAL at 22:18

## 2025-05-09 RX ADMIN — PREDNISONE 5 MG: 1 TABLET ORAL at 09:08

## 2025-05-09 RX ADMIN — CEFAZOLIN SODIUM 2000 MG: 2 SOLUTION INTRAVENOUS at 05:45

## 2025-05-09 RX ADMIN — METRONIDAZOLE 500 MG: 500 INJECTION, SOLUTION INTRAVENOUS at 02:12

## 2025-05-09 RX ADMIN — INSULIN LISPRO 3 UNITS: 100 INJECTION, SOLUTION INTRAVENOUS; SUBCUTANEOUS at 22:20

## 2025-05-09 RX ADMIN — CEFAZOLIN SODIUM 2000 MG: 2 SOLUTION INTRAVENOUS at 22:18

## 2025-05-09 RX ADMIN — Medication 1 TABLET: at 09:08

## 2025-05-09 RX ADMIN — LISINOPRIL 20 MG: 20 TABLET ORAL at 09:08

## 2025-05-09 RX ADMIN — HEPARIN SODIUM 20 UNITS/KG/HR: 10000 INJECTION, SOLUTION INTRAVENOUS at 00:02

## 2025-05-09 RX ADMIN — INSULIN LISPRO 1 UNITS: 100 INJECTION, SOLUTION INTRAVENOUS; SUBCUTANEOUS at 07:51

## 2025-05-09 RX ADMIN — FAMOTIDINE 20 MG: 20 TABLET, FILM COATED ORAL at 17:08

## 2025-05-09 RX ADMIN — FAMOTIDINE 20 MG: 20 TABLET, FILM COATED ORAL at 09:08

## 2025-05-09 RX ADMIN — ACETAMINOPHEN 975 MG: 325 TABLET ORAL at 13:45

## 2025-05-09 RX ADMIN — METRONIDAZOLE 500 MG: 500 INJECTION, SOLUTION INTRAVENOUS at 14:35

## 2025-05-09 RX ADMIN — AMLODIPINE BESYLATE 5 MG: 5 TABLET ORAL at 09:08

## 2025-05-09 RX ADMIN — AMLODIPINE BESYLATE 5 MG: 5 TABLET ORAL at 20:12

## 2025-05-09 RX ADMIN — ATORVASTATIN CALCIUM 40 MG: 40 TABLET, FILM COATED ORAL at 17:08

## 2025-05-09 RX ADMIN — ATENOLOL 25 MG: 25 TABLET ORAL at 09:08

## 2025-05-09 RX ADMIN — HEPARIN SODIUM 24 UNITS/KG/HR: 10000 INJECTION, SOLUTION INTRAVENOUS at 18:40

## 2025-05-09 NOTE — ASSESSMENT & PLAN NOTE
Lab Results   Component Value Date    HGBA1C 9.3 (H) 04/09/2025       Recent Labs     05/08/25  1101 05/08/25  1633 05/08/25  2112 05/09/25  0733   POCGLU 142* 322* 272* 165*       Blood Sugar Average: Last 72 hrs:  (P) [id number]  Hold home regimen including glipizide, linagliptin and metformin  Placed on SSI  May need a long-acting insulin in the setting of chronic steroid use  Restricted carbohydrate diet  Hypoglycemia protocol

## 2025-05-09 NOTE — PLAN OF CARE
Problem: PHYSICAL THERAPY ADULT  Goal: Performs mobility at highest level of function for planned discharge setting.  See evaluation for individualized goals.  Description: Treatment/Interventions: Functional transfer training, LE strengthening/ROM, Elevations, Therapeutic exercise, Endurance training, Gait training  Equipment Recommended: Cane, Walker       See flowsheet documentation for full assessment, interventions and recommendations.  Outcome: Progressing  Note: Prognosis: Fair  Problem List: Decreased strength, Decreased endurance, Impaired balance, Decreased mobility, Pain  Assessment: Pt seen for PT treatment session this date with interventions consisting of gait training to normalize gait pattern to decrease fall risk and therapeutic exercise to improve strength to improve functional mobility. Pt agreeable to PT treatment session upon arrival, pt found seated OOB in recliner, in no apparent distress, A&O x 4, and responsive. Since previous session, pt has made good progress as evidenced by increased distance ambulated, decreased assistance required with mobility, and ability to ambulate  Barriers during this session include pain and fatigue.  Pt continues to be functioning below baseline level, and remains limited 2* factors listed above and including decreased strength, impaired activity tolerance, and pain.  Pt prognosis for achieving goals is good, pending pt progress with hospitalization/medical status improvements, and indicated by motivated to participate in therapy and ability to follow cues. PT will continue to see pt during current hospitalization in order to address the deficits listed above and provide interventions consistent w/ POC in effort to achieve goals. Current goals and POC remain appropriate, pt continues to have rehab potential  Upon conclusion pt seated OOB in recliner. The patient's AM-PAC Basic Mobility Inpatient Short Form Raw Score is 17.  A Raw score of greater than 16  suggests the patient may benefit from discharge to home. Based on patient presentations and impairments, pt would most appropriately benefit from Level 3 resource intensity upon discharge.  Please also refer to the recommendation of the Physical Therapist for safe discharge planning. RN verbalized pt appropriate for PT session.        Rehab Resource Intensity Level, PT: III (Minimum Resource Intensity)    See flowsheet documentation for full assessment.

## 2025-05-09 NOTE — PHYSICAL THERAPY NOTE
"   PT Treatment Note    NAME:  Gold Van  DATE: 05/09/25    AGE:   79 y.o.  Mrn:   [id number]  ADMIT DX:  PAF (paroxysmal atrial fibrillation) (HCC) [I48.0]  Essential hypertension [I10]  Foot swelling [M79.89]  Peripheral arterial disease (HCC) [I73.9]  Right foot infection [L08.9]  Sepsis (HCC) [A41.9]  Ulcer of right foot with fat layer exposed (HCC) [L97.512]  Performed at least 2 patient identifiers during session: Name and Epic photo       05/09/25 1050   PT Last Visit   PT Visit Date 05/09/25   Note Type   Note Type Treatment   Pain Assessment   Pain Assessment Tool 0-10   Pain Score 6   Pain Location/Orientation Orientation: Right;Location: Foot   Pain Onset/Description Onset: Ongoing   Effect of Pain on Daily Activities mobility   Patient's Stated Pain Goal No pain   Hospital Pain Intervention(s) Medication (See MAR)   Restrictions/Precautions   Weight Bearing Precautions Per Order No   Other Precautions Chair Alarm;Fall Risk;Pain   General   Chart Reviewed Yes   Family/Caregiver Present No   Cognition   Overall Cognitive Status WFL   Arousal/Participation Alert;Responsive;Cooperative   Attention Within functional limits   Orientation Level Oriented X4   Memory Decreased recall of precautions   Following Commands Follows one step commands without difficulty   Subjective   Subjective \"i want to start walking more   Transfers   Sit to Stand 5  Supervision   Additional items Assist x 1;Increased time required;Verbal cues   Stand to Sit 5  Supervision   Additional items Assist x 1;Increased time required;Verbal cues   Ambulation/Elevation   Gait pattern Decreased foot clearance;Improper Weight shift   Gait Assistance   (cga)   Additional items Assist x 1;Verbal cues   Assistive Device SPC   Distance 365 ft   Ambulation/Elevation Additional Comments ambulation much improved today, pt more confident in his abilities   Balance   Static Sitting Good   Dynamic Sitting Fair +   Static Standing Fair +   Dynamic " Standing Fair   Ambulatory Fair -   Activity Tolerance   Activity Tolerance Patient limited by pain;Patient limited by fatigue   Exercises   Glute Sets Sitting;20 reps;Bilateral   Hip Abduction Sitting;20 reps;Bilateral   Hip Adduction Sitting;20 reps;Bilateral   Knee AROM Long Arc Quad Sitting;Bilateral;25 reps   Ankle Pumps Sitting;25 reps;Bilateral   Squat Standing;10 reps  (10 sts)   Marching Sitting;20 reps;Bilateral   Assessment   Prognosis Fair   Problem List Decreased strength;Decreased endurance;Impaired balance;Decreased mobility;Pain   Assessment Pt seen for PT treatment session this date with interventions consisting of gait training to normalize gait pattern to decrease fall risk and therapeutic exercise to improve strength to improve functional mobility. Pt agreeable to PT treatment session upon arrival, pt found seated OOB in recliner, in no apparent distress, A&O x 4, and responsive. Since previous session, pt has made good progress as evidenced by increased distance ambulated, decreased assistance required with mobility, and ability to ambulate  Barriers during this session include pain and fatigue.  Pt continues to be functioning below baseline level, and remains limited 2* factors listed above and including decreased strength, impaired activity tolerance, and pain.  Pt prognosis for achieving goals is good, pending pt progress with hospitalization/medical status improvements, and indicated by motivated to participate in therapy and ability to follow cues. PT will continue to see pt during current hospitalization in order to address the deficits listed above and provide interventions consistent w/ POC in effort to achieve goals. Current goals and POC remain appropriate, pt continues to have rehab potential  Upon conclusion pt seated OOB in recliner. The patient's AM-PAC Basic Mobility Inpatient Short Form Raw Score is 17.  A Raw score of greater than 16 suggests the patient may benefit from  discharge to home. Based on patient presentations and impairments, pt would most appropriately benefit from Level 3 resource intensity upon discharge.  Please also refer to the recommendation of the Physical Therapist for safe discharge planning. RN verbalized pt appropriate for PT session.   Goals   Patient Goals to walk   LTG Expiration Date 05/18/25   PT Treatment Day 1   Plan   Treatment/Interventions Functional transfer training;LE strengthening/ROM;Elevations;Therapeutic exercise;Endurance training;Gait training   Progress Progressing toward goals   PT Frequency 3-5x/wk   Discharge Recommendation   Rehab Resource Intensity Level, PT III (Minimum Resource Intensity)   Equipment Recommended Cane;Walker   AM-PAC Basic Mobility Inpatient   Turning in Flat Bed Without Bedrails 4   Lying on Back to Sitting on Edge of Flat Bed Without Bedrails 4   Moving Bed to Chair 3   Standing Up From Chair Using Arms 3   Walk in Room 3   Climb 3-5 Stairs With Railing 3   Basic Mobility Inpatient Raw Score 20   Basic Mobility Standardized Score 43.99   [facility] Highest Level Of Mobility   JH-HLM Goal 6: Walk 10 steps or more   JH-HLM Achieved 8: Walk 250 feet ot more       Time In: 1010  Time Out: 1050  Total Treatment Minutes: 40    Domenico Robb, PT

## 2025-05-09 NOTE — ASSESSMENT & PLAN NOTE
"LEADs 4/18-   Right-50 to 75% stenosis in the distal superficial femoral artery.  Occlusion versus high-grade stenosis in the distal posterior tibial artery.  Great toe pressure is below the healing range  Left-there is occlusive versus high-grade stenosis in the posterior tibial and anterior tibial arteries. Great toe pressure is below the healing range for diabetic patient.  Compared to 7/3/2023-continue stenosis and occlusion on the right.  New occlusion of the left.  Significant decrease of right RIC.  CTA (5/6) \"Focal high-grade stenosis of distal right CFA and diffuse atherosclerotic disease of right SFA resulting in moderate to severe stenoses with focal near complete occlusions at the proximal and distal segments. Short segment occlusions of proximal right anterior tibial and peroneal arteries with reconstitution in the medial to distal segments. Right posterior tibial artery is occluded. Occluded left SFA with reconstitution in the distal segment. Left anterior tibial and posterior tibial arteries are occluded. One-vessel runoff of the left lower extremity through the peroneal artery. Focal high-grade stenosis at the proximal right renal artery.\"  Vascular surgery input appreciated   The patient will required a femoral endarterectomy with possible bypass. This is scheduled for 5/13 at [facility].  Initiate the transfer process over the weekend Saturday versus Sunday so patient can on Sunday  Continue statin, aspirin, heparin infusion  Will initiate transfer on Sunday/Monday.   "

## 2025-05-09 NOTE — PROGRESS NOTES
"Progress Note - Hospitalist   Name: Gold Van 79 y.o. male I MRN: [id number]  Unit/Bed#: -01 I Date of Admission: 5/7/2025   Date of Service: 5/9/2025 I Hospital Day: 2    Assessment & Plan  Sepsis without acute organ dysfunction (HCC)  The patient presented with increasing pain on the right foot with history of severe peripheral vascular disease and DM ulcer wound on the right foot   The patient meets sepsis criteria with tachycardia and leukocytosis with underlying right chronic diabetic ulcer wound with cellulitis and osteomyelitis  Podiatry and vascular surgery input appreciated  Will require vascular intervention- a femoral endarterectomy with possible bypass. This is scheduled for 5/13 at [facility]  prior to podiatric surgery.   Started on cefazolin and Flagyl  Follow-up with cultures  Monitor fever trends/WBC    Severe peripheral arterial disease (HCC)  LEADs 4/18-   Right-50 to 75% stenosis in the distal superficial femoral artery.  Occlusion versus high-grade stenosis in the distal posterior tibial artery.  Great toe pressure is below the healing range  Left-there is occlusive versus high-grade stenosis in the posterior tibial and anterior tibial arteries. Great toe pressure is below the healing range for diabetic patient.  Compared to 7/3/2023-continue stenosis and occlusion on the right.  New occlusion of the left.  Significant decrease of right RIC.  CTA (5/6) \"Focal high-grade stenosis of distal right CFA and diffuse atherosclerotic disease of right SFA resulting in moderate to severe stenoses with focal near complete occlusions at the proximal and distal segments. Short segment occlusions of proximal right anterior tibial and peroneal arteries with reconstitution in the medial to distal segments. Right posterior tibial artery is occluded. Occluded left SFA with reconstitution in the distal segment. Left anterior tibial and posterior tibial arteries are occluded. One-vessel runoff of the " "left lower extremity through the peroneal artery. Focal high-grade stenosis at the proximal right renal artery.\"  Vascular surgery input appreciated   The patient will required a femoral endarterectomy with possible bypass. This is scheduled for 5/13 at [facility].  Initiate the transfer process over the weekend Saturday versus Sunday so patient can on Sunday  Continue statin, aspirin, heparin infusion  Will initiate transfer on Sunday/Monday.   Type 2 diabetes mellitus with complication, without long-term current use of insulin ([facility])  Lab Results   Component Value Date    HGBA1C 9.3 (H) 04/09/2025       Recent Labs     05/08/25  1101 05/08/25  1633 05/08/25  2112 05/09/25  0733   POCGLU 142* 322* 272* 165*       Blood Sugar Average: Last 72 hrs:  (P) 206.2576708014719841  Hold home regimen including glipizide, linagliptin and metformin  Placed on SSI  May need a long-acting insulin in the setting of chronic steroid use  Restricted carbohydrate diet  Hypoglycemia protocol      Essential hypertension  Home regimen amlodipine 5 mg daily, atenolol 25 mg daily, and lisinopril 20 mg twice daily  Continue with current regimen at this time  Holding parameter, SBP less than 130 in the setting of sepsis  Continue to monitor blood pressure closely and adjust medication as indicated    Mild intermittent asthma without complication  In no acute exacerbation  Continue with Breztri and albuterol as needed   Pruritus  Chronic pruritus  Has been maintained on prednisone 5 mg daily  Will continue    CVA (cerebrovascular accident) (HCC)  History of CVA 9/2024  Continue stroke prevention with statin, aspirin  Hold Eliquis for now while waiting for vascular procedure   Continue with heparin infusion   Ulcer of right foot with fat layer exposed (HCC)  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   Podiatry input appreciated-will likely need a partial fifth ray amputation following revascularization   Continue on cefazolin and Flagyl  Continue with local wound care     PAF (paroxysmal atrial fibrillation) (HCC)  Currently in sinus rhythm  Urology input appreciated  Continue atenolol 25 mg daily for rate control  Hold Eliquis for now, currently on heparin infusion     Cardiomyopathy (HCC)  Primary cardiologist- Dr. Ho  LVEF of 50% on TTE 10/2024  Cardiology input appreciated-patient is appropriate risk to proceed with surgery  Continue medical management   Currently not on diuretic therapy   Monitor volume status close, cautious with IV fluids      VTE Pharmacologic Prophylaxis: VTE Score: 5  IV heparin    Mobility:   Basic Mobility Inpatient Raw Score: 19  JH-HLM Goal: 6: Walk 10 steps or more  JH-HLM Achieved: 6: Walk 10 steps or more    Education and Discussions with Family / Patient: Patient declined call to .     Current Length of Stay: 2 day(s)  Current Patient Status: Inpatient     Discharge Plan:  Patient awaiting transfer to [facility] over the weekend for procedure scheduled on 5/13/2025    Code Status: Level 1 - Full Code    Subjective   Patient seen examined at bedside.  No acute concern as of yet.    Objective :  Temp:  [98.1 °F (36.7 °C)] 98.1 °F (36.7 °C)  HR:  [79-84] 81  BP: (129-145)/(71-76) 135/76  Resp:  [16-17] 16  SpO2:  [91 %-97 %] 94 %  O2 Device: None (Room air)    Body mass index is 22.38 kg/m².     Input and Output Summary (last 24 hours):     Intake/Output Summary (Last 24 hours) at 5/9/2025 1005  Last data filed at 5/9/2025 0915  Gross per 24 hour   Intake 1485 ml   Output 2350 ml   Net -865 ml       Physical Exam  Vitals and nursing note reviewed.   Constitutional:       General: He is not in acute  distress.     Appearance: He is well-developed.   HENT:      Head: Normocephalic and atraumatic.   Eyes:      Conjunctiva/sclera: Conjunctivae normal.   Cardiovascular:      Rate and Rhythm: Normal rate.      Heart sounds: No murmur heard.  Pulmonary:      Effort: Pulmonary effort is normal. No respiratory distress.      Breath sounds: Normal breath sounds.   Abdominal:      Palpations: Abdomen is soft.      Tenderness: There is no abdominal tenderness.   Musculoskeletal:         General: No swelling.      Cervical back: Neck supple.   Skin:     General: Skin is warm and dry.      Capillary Refill: Capillary refill takes less than 2 seconds.   Neurological:      Mental Status: He is alert and oriented to person, place, and time.   Psychiatric:         Mood and Affect: Mood normal.           Lines/Drains:              Lab Results: I have reviewed the following results:   Results from last 7 days   Lab Units 05/09/25 0543 05/08/25 0425 05/07/25 0900   WBC Thousand/uL 9.00   < > 12.54*   HEMOGLOBIN g/dL 10.4*   < > 11.5*   HEMATOCRIT % 32.1*   < > 36.0*   PLATELETS Thousands/uL 257   < > 310   SEGS PCT %  --   --  75   LYMPHO PCT %  --   --  14   MONO PCT %  --   --  8   EOS PCT %  --   --  2    < > = values in this interval not displayed.     Results from last 7 days   Lab Units 05/09/25 0543 05/08/25 0425 05/07/25  0900   SODIUM mmol/L 135   < > 137   POTASSIUM mmol/L 4.0   < > 3.9   CHLORIDE mmol/L 104   < > 102   CO2 mmol/L 24   < > 25   BUN mg/dL 15   < > 17   CREATININE mg/dL 0.91   < > 0.95   ANION GAP mmol/L 7   < > 10   CALCIUM mg/dL 8.9   < > 9.4   ALBUMIN g/dL  --   --  3.7   TOTAL BILIRUBIN mg/dL  --   --  0.31   ALK PHOS U/L  --   --  73   ALT U/L  --   --  10   AST U/L  --   --  9*   GLUCOSE RANDOM mg/dL 175*   < > 155*    < > = values in this interval not displayed.     Results from last 7 days   Lab Units 05/07/25  0900   INR  1.14     Results from last 7 days   Lab Units 05/09/25  0733  05/08/25  2112 05/08/25  1633 05/08/25  1101 05/08/25  0719 05/07/25  2055 05/07/25  1557   POC GLUCOSE mg/dl 165* 272* 322* 142* 168* 261* 117         Results from last 7 days   Lab Units 05/08/25  0425 05/07/25  1242 05/07/25  0900   LACTIC ACID mmol/L  --  1.5 2.4*   PROCALCITONIN ng/ml 0.11  --  0.08       Recent Cultures (last 7 days):   Results from last 7 days   Lab Units 05/07/25  0900   BLOOD CULTURE  No Growth at 24 hrs.  No Growth at 24 hrs.       XR foot 3+ views RIGHT  Result Date: 5/7/2025  Impression: Ulceration along the lateral aspect of the foot and soft tissue swelling along the dorsum of the foot without radiographic findings of osteomyelitis. Computerized Assisted Algorithm (CAA) may have been used to analyze all applicable images. Workstation performed: [id number]       Last 24 Hours Medication List:     Current Facility-Administered Medications:     acetaminophen (TYLENOL) tablet 975 mg, Q8H SANDRA    albuterol (PROVENTIL HFA,VENTOLIN HFA) inhaler 1 puff, Q4H PRN    amLODIPine (NORVASC) tablet 5 mg, BID    atenolol (TENORMIN) tablet 25 mg, Daily    atorvastatin (LIPITOR) tablet 40 mg, QPM    Budeson-Glycopyrrol-Formoterol 160-9-4.8 MCG/ACT AERO 2 puff, BID    ceFAZolin (ANCEF) IVPB (premix in dextrose) 2,000 mg 50 mL, Q8H, Last Rate: 2,000 mg (05/09/25 0545)    famotidine (PEPCID) tablet 20 mg, BID    heparin (porcine) 25,000 units in 0.45% NaCl 250 mL infusion (premix), Titrated, Last Rate: 22 Units/kg/hr (05/09/25 0638)    heparin (porcine) injection 2,800 Units, Q6H PRN    heparin (porcine) injection 5,600 Units, Q6H PRN    insulin lispro (HumALOG/ADMELOG) 100 units/mL subcutaneous injection 1-5 Units, TID AC **AND** Fingerstick Glucose (POCT), TID AC    insulin lispro (HumALOG/ADMELOG) 100 units/mL subcutaneous injection 1-5 Units, HS    lisinopril (ZESTRIL) tablet 20 mg, BID    LORazepam (ATIVAN) tablet 0.5 mg, Q8H PRN    metroNIDAZOLE (FLAGYL) IVPB (premix) 500 mg 100 mL, Q12H, Last Rate:  200 mL/hr at 05/09/25 0356    montelukast (SINGULAIR) tablet 10 mg, HS    morphine injection 2 mg, Q4H PRN    multivitamin-minerals (CENTRUM) tablet 1 tablet, Daily    oxyCODONE (ROXICODONE) IR tablet 5 mg, Q4H PRN **OR** oxyCODONE (ROXICODONE) immediate release tablet 10 mg, Q4H PRN    predniSONE tablet 5 mg, Daily    Administrative Statements   Today, Patient Was Seen By: Michelle Dietrich MD  I have spent a total time of >35 minutes in caring for this patient on the day of the visit/encounter including Diagnostic results, Prognosis, Instructions for management, Patient and family education, Importance of tx compliance, Risk factor reductions, Impressions, Documenting in the medical record, Reviewing/placing orders in the medical record (including tests, medications, and/or procedures), and Communicating with other healthcare professionals .    **Please Note: This note may have been constructed using a voice recognition system.**

## 2025-05-09 NOTE — ASSESSMENT & PLAN NOTE
The patient presented with increasing pain on the right foot with history of severe peripheral vascular disease and DM ulcer wound on the right foot   The patient meets sepsis criteria with tachycardia and leukocytosis with underlying right chronic diabetic ulcer wound with cellulitis and osteomyelitis  Podiatry and vascular surgery input appreciated  Will require vascular intervention- a femoral endarterectomy with possible bypass. This is scheduled for 5/13 at [facility]  prior to podiatric surgery.   Started on cefazolin and Flagyl  Follow-up with cultures  Monitor fever trends/WBC

## 2025-05-09 NOTE — UTILIZATION REVIEW
NOTIFICATION OF INPATIENT ADMISSION   AUTHORIZATION REQUEST   SERVICING FACILITY:   [place] [address]  Tax ID: [id number]  NPI: [id number]   ATTENDING PROVIDER:  Attending Name and NPI#: Michelle Dietrich Md [[id number]]  Address: [address] [place] [address]  Phone: [phone]     ADMISSION INFORMATION:  Place of Service: Inpatient Acute [facility]  Place of Service Code: 21  Inpatient Admission Date/Time: 5/7/25 11:44 AM  Discharge Date/Time: No discharge date for patient encounter.  Admitting Diagnosis Code/Description:  PAF (paroxysmal atrial fibrillation) ([facility]) [I48.0]  Essential hypertension [I10]  Foot swelling [M79.89]  Peripheral arterial disease (HCC) [I73.9]  Right foot infection [L08.9]  Sepsis (HCC) [A41.9]  Ulcer of right foot with fat layer exposed ([facility]) [L97.512]     UTILIZATION REVIEW CONTACT:  Katie Sousa Utilization   Network Utilization Review Department  Phone: [phone]  Fax: [phone]  Email: [email or web address]  Contact for approvals/pending authorizations, clinical reviews, and discharge.     PHYSICIAN ADVISORY SERVICES:  Medical Necessity Denial & [place] Review  Phone: [phone]  Fax: [phone]  Email: [email or web address]     DISCHARGE SUPPORT TEAM:  For Patients Discharge Needs & Updates  Phone: [phone] opt. 2 Fax: [phone]  Email: [email or web address]

## 2025-05-09 NOTE — PLAN OF CARE
Problem: OCCUPATIONAL THERAPY ADULT  Goal: Performs self-care activities at highest level of function for planned discharge setting.  See evaluation for individualized goals.  Description: Treatment Interventions: ADL retraining, Functional transfer training, UE strengthening/ROM, Endurance training, Patient/family training, Compensatory technique education, Energy conservation, Activityengagement     See flowsheet documentation for full assessment, interventions and recommendations.   Note: Limitation: Decreased ADL status, Decreased Safe judgement during ADL, Decreased UE strength, Decreased endurance, Decreased self-care trans, Decreased high-level ADLs  Prognosis: Good  Assessment: Pt is a 79 y.o. male seen for OT evaluation s/p admit to [facility] on 5/7/2025 w/ Sepsis without acute organ dysfunction (HCC).  Comorbidities affecting pt's functional performance at time of assessment include: type 2 diabetes, HTN, asthma, CVA, PVD, cardiomyopathy, PAF, pulmonary nodule, COPD. Personal factors affecting pt at time of IE include:steps to enter environment, difficulty performing ADLS, difficulty performing IADLS , decreased initiation and engagement , health management , and environment. OT order placed to assess Pt's ADLs, cognitive status, and performance during functional tasks in order to maximize safety and independence while making most appropriate d/c recommendations. Prior to admission, pt was I with ADLs and mobility but requires A with IADLs. Upon evaluation: the following deficits impact occupational performance: weakness, decreased strength, decreased balance, decreased tolerance, impaired problem solving, decreased safety awareness, and increased pain. Pt's clinical presentation is currently stable  given new onset deficits that effect Pt's occupational performance and ability to safely return to OF including decrease activity tolerance, decrease standing balance, decrease sitting balance, decrease  performance during ADL tasks, decrease safety awareness , decrease UB MS, decrease generalized strength, decrease activity engagement, decrease sensation, and high fall risk combined with medical complications of pain impacting overall mobility status, abnormal CBC, wounds, decreased skin integrity, and need for input for mobility technique/safety.  Pt to benefit from continued skilled OT tx while in the hospital to address deficits as defined above and maximize level of functional independence w ADL's and functional mobility. Occupational Performance areas to address include: grooming, bathing/shower, toilet hygiene, dressing, functional mobility, community mobility, and clothing management. From OT standpoint, recommendation at time of d/c would with minimal intensity OT resources.     Rehab Resource Intensity Level, OT: III (Minimum Resource Intensity)     Marcella Redd OTR/L

## 2025-05-09 NOTE — ASSESSMENT & PLAN NOTE
"80yo male PMH afib on eliquis, stroke 9/2024, HTN, DM, HLD presents with infected right foot wound. Vascular consulted to optimized blood flow.    -LEADs 4/18: \"RIGHT LOWER LIMB:  There is a >75% stenosis noted in the proximal superficial femoral artery and a  50-75% stenosis in the distal superficial femoral artery. Occlusion vs high  grade stenosis noted in the distal posterior tibial artery. Diffuse disease with  calcification and heavy shadowing throughout the remaining femoro-popliteal and  tibio-peroneal segments may obscure more significant stenosis.  Ankle/Brachial index: 0.42 which is in the ischemic disease category (Prior  0.83)  PVR/ PPG tracings are dampened.  Metatarsal pressure of  15 mmHg  Great toe pressure of  16 mmHg, below the healing range     LEFT LOWER LIMB:  There is occlusion vs high grade stenosis in the posterior tibial and anterior  tibial arteries. Diffuse disease with calcification and heavy shadowing  throughout the remaining femoro-popliteal and tibio-peroneal segments may  obscure more significant stenosis.  Ankle/Brachial index:   1.06 which is in the normal category (Prior 1.03)  PVR/ PPG tracings are dampened.  Metatarsal pressure of  85 mmHg  Great toe pressure of  45 mmHg, below the healing range for a diabetic patient     Compared to previous study on 7/3/2023 , there is new stenosis and occlusion on  the right. New occlusion of the left. Significant decrease of right RIC.  Bilateral toe pressure now below healing range.\"   -CTA 5/6 final read pending   -right foot wound on the lateral aspect of the foot, sensation/motor intact; nonpalpable R PT, 1+ DP, 2+ femoral   -afebrile, tachycardic   -no leukocytosis   -Hgb stable   -Cr wnl    Plan:  -patient scheduled for right femoral endarterectomy with Dr Aiken 5/13 at [facility]; patient can be transferred over the weekend once bed is available  -okay for regular diet; will need to be NPO midnight prior to procedure  -glucose " control  -podiatry and cardiology consults, appreciate recs  -vein mapping complete  -IV abx, IVF  -local wound care to right foot per podiatry  -hold eliquis and continue heparin drip  -discussed with on call vascular surgeon Dr Aiken; final recs pending attending attestation

## 2025-05-09 NOTE — PROGRESS NOTES
"Progress Note - Surgery-General   Name: Gold Van 79 y.o. male I MRN: [id number]  Unit/Bed#: -01 I Date of Admission: 5/7/2025   Date of Service: 5/9/2025 I Hospital Day: 2    Assessment & Plan  Ulcer of right foot with fat layer exposed (HCC)  80yo male PMH afib on eliquis, stroke 9/2024, HTN, DM, HLD presents with infected right foot wound. Vascular consulted to optimized blood flow.    -LEADs 4/18: \"RIGHT LOWER LIMB:  There is a >75% stenosis noted in the proximal superficial femoral artery and a  50-75% stenosis in the distal superficial femoral artery. Occlusion vs high  grade stenosis noted in the distal posterior tibial artery. Diffuse disease with  calcification and heavy shadowing throughout the remaining femoro-popliteal and  tibio-peroneal segments may obscure more significant stenosis.  Ankle/Brachial index: 0.42 which is in the ischemic disease category (Prior  0.83)  PVR/ PPG tracings are dampened.  Metatarsal pressure of  15 mmHg  Great toe pressure of  16 mmHg, below the healing range     LEFT LOWER LIMB:  There is occlusion vs high grade stenosis in the posterior tibial and anterior  tibial arteries. Diffuse disease with calcification and heavy shadowing  throughout the remaining femoro-popliteal and tibio-peroneal segments may  obscure more significant stenosis.  Ankle/Brachial index:   1.06 which is in the normal category (Prior 1.03)  PVR/ PPG tracings are dampened.  Metatarsal pressure of  85 mmHg  Great toe pressure of  45 mmHg, below the healing range for a diabetic patient     Compared to previous study on 7/3/2023 , there is new stenosis and occlusion on  the right. New occlusion of the left. Significant decrease of right RIC.  Bilateral toe pressure now below healing range.\"   -CTA 5/6 final read pending   -right foot wound on the lateral aspect of the foot, sensation/motor intact; nonpalpable R PT, 1+ DP, 2+ femoral   -afebrile, tachycardic   -no leukocytosis   -Hgb stable   -Cr " wnl    Plan:  -patient scheduled for right femoral endarterectomy with Dr Aiken 5/13 at [facility]; patient can be transferred over the weekend once bed is available  -okay for regular diet; will need to be NPO midnight prior to procedure  -glucose control  -podiatry and cardiology consults, appreciate recs  -vein mapping complete  -IV abx, IVF  -local wound care to right foot per podiatry  -hold eliquis and continue heparin drip  -discussed with on call vascular surgeon Dr Aiken; final recs pending attending attestation  Severe peripheral arterial disease (HCC)  -scheduled 5/13/25 for right femoral endarterectomy at [facility]        Subjective   Patient doing well. Foot pain has gotten a bit better since he has been off of it. Patient scheduled for surgery 5/13 at [facility].     Objective :  Temp:  [98.1 °F (36.7 °C)] 98.1 °F (36.7 °C)  HR:  [79-84] 81  BP: (129-145)/(71-76) 135/76  Resp:  [16-17] 16  SpO2:  [91 %-97 %] 94 %  O2 Device: None (Room air)    I/O         05/07 0701  05/08 0700 05/08 0701  05/09 0700 05/09 0701  05/10 0700    P.O. 120 1020 300    I.V. (mL/kg) 234.3 (3.2) 315 (4.2)     IV Piggyback 2050 150     Total Intake(mL/kg) 2404.3 (32.5) 1485 (19.9) 300 (4)    Urine (mL/kg/hr) 1600 2800 (1.6) 200 (0.8)    Total Output 1600 2800 200    Net +804.3 -1315 +100                   Physical Exam  Constitutional:       Appearance: Normal appearance.   HENT:      Mouth/Throat:      Mouth: Mucous membranes are moist.      Pharynx: Oropharynx is clear.   Cardiovascular:      Rate and Rhythm: Normal rate.   Pulmonary:      Effort: Pulmonary effort is normal.   Abdominal:      General: Abdomen is flat.   Musculoskeletal:         General: Normal range of motion.      Comments: Slight edema right ankle. Right foot is minimally tender on the lateral aspect. Cellulitis slightly improved.    Skin:     General: Skin is warm and dry.   Neurological:      General: No focal deficit present.      Mental Status: He is alert.         Lab  Results: I have reviewed the following results:  Recent Labs     05/07/25  0900 05/07/25  1242 05/07/25  1819 05/09/25  0543   WBC 12.54*  --    < > 9.00   HGB 11.5*  --    < > 10.4*   HCT 36.0*  --    < > 32.1*     --    < > 257   SODIUM 137  --    < > 135   K 3.9  --    < > 4.0     --    < > 104   CO2 25  --    < > 24   BUN 17  --    < > 15   CREATININE 0.95  --    < > 0.91   GLUC 155*  --    < > 175*   AST 9*  --   --   --    ALT 10  --   --   --    ALB 3.7  --   --   --    TBILI 0.31  --   --   --    ALKPHOS 73  --   --   --    PTT 31  --    < > 53*   INR 1.14  --   --   --    LACTICACID 2.4* 1.5  --   --     < > = values in this interval not displayed.         VTE Pharmacologic Prophylaxis: Heparin  VTE Mechanical Prophylaxis: sequential compression device    Mai Burden PA-C

## 2025-05-09 NOTE — OCCUPATIONAL THERAPY NOTE
Occupational Therapy Evaluation     Patient Name: Gold Van  Today's Date: 5/9/2025  Problem List  Principal Problem:    Sepsis without acute organ dysfunction (HCC)  Active Problems:    Type 2 diabetes mellitus with complication, without long-term current use of insulin (HCC)    Essential hypertension    Mild intermittent asthma without complication    Pruritus    CVA (cerebrovascular accident) (HCC)    Abnormal echocardiogram    Severe peripheral arterial disease (HCC)    Ulcer of right foot with fat layer exposed (HCC)    PAF (paroxysmal atrial fibrillation) (HCC)    Cardiomyopathy (HCC)    Past Medical History  Past Medical History:   Diagnosis Date    Asthma     COVID-19     CVA (cerebral vascular accident) (HCC)     Diabetes mellitus (HCC)     Environmental allergies     Hyperlipidemia     Hypertension     Macular degeneration 07/13/2020    right eye    Orthostatic hypotension     Osteopenia     Pneumonia     Pneumothorax     Sinusitis      Past Surgical History  Past Surgical History:   Procedure Laterality Date    CARPAL TUNNEL RELEASE Left 08/2024    CATARACT EXTRACTION Left 07/2024    COLONOSCOPY      KNEE CARTILAGE SURGERY Right 1964    VASECTOMY      WISDOM TOOTH EXTRACTION      x4         05/09/25 1010   OT Last Visit   OT Visit Date 05/09/25   Note Type   Note type Evaluation   Pain Assessment   Pain Assessment Tool 0-10   Pain Score 4  (0/10 at rest; 4/10 with activity)   Pain Location/Orientation Orientation: Right;Location: Foot   Pain Radiating Towards n/a   Pain Onset/Description Onset: Ongoing   Effect of Pain on Daily Activities mobility   Patient's Stated Pain Goal No pain   Hospital Pain Intervention(s) Medication (See MAR);Repositioned   Multiple Pain Sites No   Restrictions/Precautions   Weight Bearing Precautions Per Order No   Other Precautions Chair Alarm;Fall Risk;Pain   Home Living   Type of Home House   Home Layout Two level;Performs ADLs on one level;Able to live on main  "level with bedroom/bathroom   Bathroom Shower/Tub Walk-in shower   Bathroom Toilet Raised   Bathroom Equipment Grab bars in shower   Bathroom Accessibility Accessible   Home Equipment Walker;Cane  (Pt reports SPC used at baseline)   Prior Function   Level of [place] Independent with functional mobility;Needs assistance with IADLS;Independent with ADLs   Lives With Spouse   Receives Help From Family   IADLs Independent with driving;Independent with meal prep;Independent with medication management   Falls in the last 6 months 0  (pt denies)   Vocational Retired   Lifestyle   Autonomy Pt resides in two level house w/ first floor set-up; I with ADLs and mobility but requires A with IADLs; +    Reciprocal Relationships supportive family   Service to Others retired   Intrinsic Gratification being active   Subjective   Subjective \"It feels good to get up and move around\"   ADL   Where Assessed Chair   Eating Assistance 6  Modified independent   Grooming Assistance 6  Modified Independent   UB Bathing Assistance 6  Modified Independent   LB Bathing Assistance 5  Supervision/Setup   UB Dressing Assistance 6  Modified independent   LB Dressing Assistance 5  Supervision/Setup   LB Dressing Deficit Don/doff R sock;Don/doff L sock  (cross over leg technique)   Bed Mobility   Additional Comments DNT; pt seated in recliner upon arrival and conclusion of session   Transfers   Sit to Stand 5  Supervision   Additional items Assist x 1;Increased time required;Verbal cues   Stand to Sit 5  Supervision   Additional items Assist x 1;Increased time required;Verbal cues;Armrests   Additional Comments SPC used; VC for safe hand placement, proper body mecahnics and overall SPC used for directional turns   Functional Mobility   Functional Mobility   (CGA)   Additional Comments Pt completed long household mobility distance at CGA level w/ SPC. No significant LOB observed, mild to moderate instability. Slight LOB observed with " right/left swaying required min A to correct. Frequent rest breaks required d/t fatigue   Additional items SPC   Balance   Static Sitting Good   Dynamic Sitting Fair +   Static Standing Fair +   Dynamic Standing Fair   Ambulatory Fair -   Activity Tolerance   Activity Tolerance Patient limited by fatigue;Patient limited by pain   Medical Staff Made Aware Yes, CM made aware of d/c recs   Nurse Made Aware Yes, nursing staff made aware of session outcomes   RUE Assessment   RUE Assessment WFL   RUE Strength   RUE Overall Strength   (3+/5)   LUE Assessment   LUE Assessment WFL   LUE Strength   LUE Overall Strength   (3+/5)   Hand Function   Gross Motor Coordination Functional   Fine Motor Coordination Functional   Sensation   Light Touch No apparent deficits   Vision-Basic Assessment   Current Vision Wears glasses all the time   Psychosocial   Psychosocial (WDL) WDL   Cognition   Overall Cognitive Status WFL   Arousal/Participation Alert;Responsive;Cooperative   Attention Within functional limits   Orientation Level Oriented X4   Memory Decreased recall of precautions   Following Commands Follows one step commands without difficulty   Comments Pt agreeable to OT evaluation, pleasant   Assessment   Limitation Decreased ADL status;Decreased Safe judgement during ADL;Decreased UE strength;Decreased endurance;Decreased self-care trans;Decreased high-level ADLs   Prognosis Good   Assessment Pt is a 79 y.o. male seen for OT evaluation s/p admit to [facility] on 5/7/2025 w/ Sepsis without acute organ dysfunction (HCC).  Comorbidities affecting pt's functional performance at time of assessment include: type 2 diabetes, HTN, asthma, CVA, PVD, cardiomyopathy, PAF, pulmonary nodule, COPD. Personal factors affecting pt at time of IE include:steps to enter environment, difficulty performing ADLS, difficulty performing IADLS , decreased initiation and engagement , health management , and environment. OT order placed to assess Pt's  ADLs, cognitive status, and performance during functional tasks in order to maximize safety and independence while making most appropriate d/c recommendations. Prior to admission, pt was I with ADLs and mobility but requires A with IADLs. Upon evaluation: the following deficits impact occupational performance: weakness, decreased strength, decreased balance, decreased tolerance, impaired problem solving, decreased safety awareness, and increased pain. Pt's clinical presentation is currently stable  given new onset deficits that effect Pt's occupational performance and ability to safely return to OF including decrease activity tolerance, decrease standing balance, decrease sitting balance, decrease performance during ADL tasks, decrease safety awareness , decrease UB MS, decrease generalized strength, decrease activity engagement, decrease sensation, and high fall risk combined with medical complications of pain impacting overall mobility status, abnormal CBC, wounds, decreased skin integrity, and need for input for mobility technique/safety.  Pt to benefit from continued skilled OT tx while in the hospital to address deficits as defined above and maximize level of functional independence w ADL's and functional mobility. Occupational Performance areas to address include: grooming, bathing/shower, toilet hygiene, dressing, functional mobility, community mobility, and clothing management. From OT standpoint, recommendation at time of d/c would with minimal intensity OT resources.   Goals   Patient Goals to feel better   Plan   Treatment Interventions ADL retraining;Functional transfer training;UE strengthening/ROM;Endurance training;Patient/family training;Compensatory technique education;Energy conservation;Activityengagement   Goal Expiration Date 05/19/25   OT Treatment Day 0   OT Frequency 2-3x/wk   Discharge Recommendation   Rehab Resource Intensity Level, OT III (Minimum Resource Intensity)   Additional  Comments  The patient's raw score on the AM-PAC Daily Activity inpatient short form is 20, standardized score is 42.03, greater than 39.4. Patients at this level are likely to benefit from discharge with minimal intensity OT resources. Please refer to the recommendation of the Occupational Therapist for safe discharge planning.   AM-PAC Daily Activity Inpatient   Lower Body Dressing 3   Bathing 3   Toileting 3   Upper Body Dressing 3   Grooming 4   Eating 4   Daily Activity Raw Score 20   Daily Activity Standardized Score (Calc for Raw Score >=11) 42.03   -[facility] Applied Cognition Inpatient   Following a Speech/Presentation 3   Understanding Ordinary Conversation 4   Taking Medications 4   Remembering Where Things Are Placed or Put Away 3   Remembering List of 4-5 Errands 3   Taking Care of Complicated Tasks 2   Applied Cognition Raw Score 19   Applied Cognition Standardized Score 39.77     GOALS    Pt will achieve the following within specified time frame: LTG  Pt will achieve the following goals within 10 days    *ADL transfers with (S) for inc'd independence with ADLs/purposeful tasks    *UB ADL with (I) for inc'd independence with self cares    *LB ADL with (S) using AE prn for inc'd independence with self cares    *Toileting with (I) for clothing management and hygiene for return to PLOF with personal care    *Increase static stand balance and dyn stand balance to G- for inc'd safety with standing purposeful tasks    *Increase stand tolerance x7 m for inc'd tolerance with standing purposeful tasks    *Bed mobility- (S) for inc'd independence to manage own comfort and initiate EOB & OOB purposeful tasks    *Participate in 10-15m UE therex to increase overall stamina/activity tolerance for purposeful tasks      Marcella Redd MS, OTR/L

## 2025-05-10 LAB
ANION GAP SERPL CALCULATED.3IONS-SCNC: 6 MMOL/L (ref 4–13)
APTT PPP: 62 SECONDS (ref 23–34)
APTT PPP: 67 SECONDS (ref 23–34)
BUN SERPL-MCNC: 16 MG/DL (ref 5–25)
CALCIUM SERPL-MCNC: 8.6 MG/DL (ref 8.4–10.2)
CHLORIDE SERPL-SCNC: 101 MMOL/L (ref 96–108)
CO2 SERPL-SCNC: 26 MMOL/L (ref 21–32)
CREAT SERPL-MCNC: 0.93 MG/DL (ref 0.6–1.3)
ERYTHROCYTE [DISTWIDTH] IN BLOOD BY AUTOMATED COUNT: 13.4 % (ref 11.6–15.1)
GFR SERPL CREATININE-BSD FRML MDRD: 77 ML/MIN/1.73SQ M
GLUCOSE SERPL-MCNC: 180 MG/DL (ref 65–140)
GLUCOSE SERPL-MCNC: 199 MG/DL (ref 65–140)
GLUCOSE SERPL-MCNC: 212 MG/DL (ref 65–140)
GLUCOSE SERPL-MCNC: 254 MG/DL (ref 65–140)
GLUCOSE SERPL-MCNC: 423 MG/DL (ref 65–140)
HCT VFR BLD AUTO: 30.2 % (ref 36.5–49.3)
HGB BLD-MCNC: 9.7 G/DL (ref 12–17)
MCH RBC QN AUTO: 28.9 PG (ref 26.8–34.3)
MCHC RBC AUTO-ENTMCNC: 32.1 G/DL (ref 31.4–37.4)
MCV RBC AUTO: 90 FL (ref 82–98)
PLATELET # BLD AUTO: 245 THOUSANDS/UL (ref 149–390)
PMV BLD AUTO: 9.3 FL (ref 8.9–12.7)
POTASSIUM SERPL-SCNC: 4 MMOL/L (ref 3.5–5.3)
RBC # BLD AUTO: 3.36 MILLION/UL (ref 3.88–5.62)
SODIUM SERPL-SCNC: 133 MMOL/L (ref 135–147)
WBC # BLD AUTO: 8.55 THOUSAND/UL (ref 4.31–10.16)

## 2025-05-10 PROCEDURE — 97116 GAIT TRAINING THERAPY: CPT

## 2025-05-10 PROCEDURE — 99232 SBSQ HOSP IP/OBS MODERATE 35: CPT | Performed by: PHYSICIAN ASSISTANT

## 2025-05-10 PROCEDURE — 85730 THROMBOPLASTIN TIME PARTIAL: CPT | Performed by: NURSE PRACTITIONER

## 2025-05-10 PROCEDURE — 82948 REAGENT STRIP/BLOOD GLUCOSE: CPT

## 2025-05-10 PROCEDURE — 85027 COMPLETE CBC AUTOMATED: CPT

## 2025-05-10 PROCEDURE — 80048 BASIC METABOLIC PNL TOTAL CA: CPT

## 2025-05-10 PROCEDURE — 97110 THERAPEUTIC EXERCISES: CPT

## 2025-05-10 RX ORDER — INSULIN LISPRO 100 [IU]/ML
5 INJECTION, SOLUTION INTRAVENOUS; SUBCUTANEOUS
Status: DISCONTINUED | OUTPATIENT
Start: 2025-05-10 | End: 2025-05-11 | Stop reason: HOSPADM

## 2025-05-10 RX ORDER — INSULIN GLARGINE 100 [IU]/ML
5 INJECTION, SOLUTION SUBCUTANEOUS
Status: DISCONTINUED | OUTPATIENT
Start: 2025-05-10 | End: 2025-05-11 | Stop reason: HOSPADM

## 2025-05-10 RX ADMIN — MONTELUKAST 10 MG: 10 TABLET, FILM COATED ORAL at 21:10

## 2025-05-10 RX ADMIN — Medication 1 TABLET: at 08:05

## 2025-05-10 RX ADMIN — OXYCODONE HYDROCHLORIDE 10 MG: 10 TABLET ORAL at 22:12

## 2025-05-10 RX ADMIN — ATORVASTATIN CALCIUM 40 MG: 40 TABLET, FILM COATED ORAL at 17:05

## 2025-05-10 RX ADMIN — METRONIDAZOLE 500 MG: 500 INJECTION, SOLUTION INTRAVENOUS at 01:56

## 2025-05-10 RX ADMIN — OXYCODONE HYDROCHLORIDE 10 MG: 10 TABLET ORAL at 12:22

## 2025-05-10 RX ADMIN — HEPARIN SODIUM 24 UNITS/KG/HR: 10000 INJECTION, SOLUTION INTRAVENOUS at 23:04

## 2025-05-10 RX ADMIN — AMLODIPINE BESYLATE 5 MG: 5 TABLET ORAL at 21:10

## 2025-05-10 RX ADMIN — INSULIN LISPRO 5 UNITS: 100 INJECTION, SOLUTION INTRAVENOUS; SUBCUTANEOUS at 17:30

## 2025-05-10 RX ADMIN — ACETAMINOPHEN 975 MG: 325 TABLET ORAL at 06:03

## 2025-05-10 RX ADMIN — FAMOTIDINE 20 MG: 20 TABLET, FILM COATED ORAL at 08:05

## 2025-05-10 RX ADMIN — INSULIN LISPRO 2 UNITS: 100 INJECTION, SOLUTION INTRAVENOUS; SUBCUTANEOUS at 12:18

## 2025-05-10 RX ADMIN — OXYCODONE HYDROCHLORIDE 5 MG: 5 TABLET ORAL at 07:59

## 2025-05-10 RX ADMIN — INSULIN LISPRO 1 UNITS: 100 INJECTION, SOLUTION INTRAVENOUS; SUBCUTANEOUS at 07:58

## 2025-05-10 RX ADMIN — CEFAZOLIN SODIUM 2000 MG: 2 SOLUTION INTRAVENOUS at 15:00

## 2025-05-10 RX ADMIN — METRONIDAZOLE 500 MG: 500 INJECTION, SOLUTION INTRAVENOUS at 16:08

## 2025-05-10 RX ADMIN — LISINOPRIL 20 MG: 20 TABLET ORAL at 21:10

## 2025-05-10 RX ADMIN — BUDESONIDE, GLYCOPYRROLATE, AND FORMOTEROL FUMARATE 2 PUFF: 160; 9; 4.8 AEROSOL, METERED RESPIRATORY (INHALATION) at 21:10

## 2025-05-10 RX ADMIN — FAMOTIDINE 20 MG: 20 TABLET, FILM COATED ORAL at 17:05

## 2025-05-10 RX ADMIN — CEFAZOLIN SODIUM 2000 MG: 2 SOLUTION INTRAVENOUS at 22:12

## 2025-05-10 RX ADMIN — ACETAMINOPHEN 975 MG: 325 TABLET ORAL at 21:10

## 2025-05-10 RX ADMIN — ACETAMINOPHEN 975 MG: 325 TABLET ORAL at 14:54

## 2025-05-10 RX ADMIN — ATENOLOL 25 MG: 25 TABLET ORAL at 08:05

## 2025-05-10 RX ADMIN — HEPARIN SODIUM 24 UNITS/KG/HR: 10000 INJECTION, SOLUTION INTRAVENOUS at 09:26

## 2025-05-10 RX ADMIN — BUDESONIDE, GLYCOPYRROLATE, AND FORMOTEROL FUMARATE 2 PUFF: 160; 9; 4.8 AEROSOL, METERED RESPIRATORY (INHALATION) at 08:07

## 2025-05-10 RX ADMIN — INSULIN LISPRO 2 UNITS: 100 INJECTION, SOLUTION INTRAVENOUS; SUBCUTANEOUS at 21:10

## 2025-05-10 RX ADMIN — PREDNISONE 5 MG: 1 TABLET ORAL at 08:05

## 2025-05-10 RX ADMIN — INSULIN GLARGINE 5 UNITS: 100 INJECTION, SOLUTION SUBCUTANEOUS at 21:10

## 2025-05-10 RX ADMIN — CEFAZOLIN SODIUM 2000 MG: 2 SOLUTION INTRAVENOUS at 06:02

## 2025-05-10 NOTE — ASSESSMENT & PLAN NOTE
"LEADs 4/18-   Right-50 to 75% stenosis in the distal superficial femoral artery.  Occlusion versus high-grade stenosis in the distal posterior tibial artery.  Great toe pressure is below the healing range  Left-there is occlusive versus high-grade stenosis in the posterior tibial and anterior tibial arteries. Great toe pressure is below the healing range for diabetic patient.  Compared to 7/3/2023-continue stenosis and occlusion on the right.  New occlusion of the left.  Significant decrease of right RIC.  CTA (5/6) \"Focal high-grade stenosis of distal right CFA and diffuse atherosclerotic disease of right SFA resulting in moderate to severe stenoses with focal near complete occlusions at the proximal and distal segments. Short segment occlusions of proximal right anterior tibial and peroneal arteries with reconstitution in the medial to distal segments. Right posterior tibial artery is occluded. Occluded left SFA with reconstitution in the distal segment. Left anterior tibial and posterior tibial arteries are occluded. One-vessel runoff of the left lower extremity through the peroneal artery. Focal high-grade stenosis at the proximal right renal artery.\"  Vascular surgery input appreciated   The patient will required a femoral endarterectomy with possible bypass. This is scheduled for 5/13 at [facility].  Continue statin, aspirin, heparin infusion  Will initiate transfer on Sunday.   "

## 2025-05-10 NOTE — PLAN OF CARE
Problem: Nutrition/Hydration-ADULT  Goal: Nutrient/Hydration intake appropriate for improving, restoring or maintaining nutritional needs  Description: Monitor and assess patient's nutrition/hydration status for malnutrition. Collaborate with interdisciplinary team and initiate plan and interventions as ordered.  Monitor patient's weight and dietary intake as ordered or per policy. Utilize nutrition screening tool and intervene as necessary. Determine patient's food preferences and provide high-protein, high-caloric foods as appropriate. INTERVENTIONS:- Monitor oral intake, urinary output, labs, and treatment plans- Assess nutrition and hydration status and recommend course of action- Evaluate amount of meals eaten- Assist patient with eating if necessary - Allow adequate time for meals- Recommend/ encourage appropriate diets, oral nutritional supplements, and vitamin/mineral supplements- Order, calculate, and assess calorie counts as needed- Recommend, monitor, and adjust tube feedings and TPN/PPN based on assessed needs- Assess need for intravenous fluids- Provide specific nutrition/hydration education as appropriate- Include patient/family/caregiver in decisions related to nutrition  Outcome: Progressing     Problem: HEMATOLOGIC - ADULT  Goal: Maintains hematologic stability  Description: INTERVENTIONS- Assess for signs and symptoms of bleeding or hemorrhage- Monitor labs- Administer supportive blood products/factors as ordered and appropriate  Outcome: Progressing     Problem: METABOLIC, FLUID AND ELECTROLYTES - ADULT  Goal: Glucose maintained within target range  Description: INTERVENTIONS:- Monitor Blood Glucose as ordered- Assess for signs and symptoms of hyperglycemia and hypoglycemia- Administer ordered medications to maintain glucose within target range- Assess nutritional intake and initiate nutrition service referral as needed  Outcome: Progressing     Problem: SKIN/TISSUE INTEGRITY - ADULT  Goal:  Incision(s), wounds(s) or drain site(s) healing without S/S of infection  Description: INTERVENTIONS- Assess and document dressing, incision, wound bed, drain sites and surrounding tissue- Provide patient and family education- Perform skin care/dressing changes every daily  INTERVENTIONS- Assess and document dressing, incision, wound bed, drain sites and surrounding tissue- Provide patient and family education- Perform skin care/dressing changes every day  Outcome: Progressing

## 2025-05-10 NOTE — PHYSICAL THERAPY NOTE
PT Treatment Note    NAME:  Gold Van  DATE: 05/10/25    AGE:   79 y.o.  Mrn:   [id number]  ADMIT DX:  PAF (paroxysmal atrial fibrillation) (HCC) [I48.0]  Essential hypertension [I10]  Foot swelling [M79.89]  Peripheral arterial disease (HCC) [I73.9]  Right foot infection [L08.9]  Sepsis (HCC) [A41.9]  Ulcer of right foot with fat layer exposed (HCC) [L97.512]  Performed at least 2 patient identifiers during session: Name and ID bracelet       05/10/25 0943   PT Last Visit   PT Visit Date 05/10/25   Note Type   Note Type Treatment   Pain Assessment   Pain Assessment Tool 0-10   Pain Score 5   Pain Location/Orientation Orientation: Right;Location: Foot   Pain Onset/Description Descriptor: [facility] Pain Intervention(s) Rest   Restrictions/Precautions   Weight Bearing Precautions Per Order No   Other Precautions Fall Risk;Pain;Multiple lines;Chair Alarm   General   Chart Reviewed Yes   Family/Caregiver Present Yes   Cognition   Overall Cognitive Status WFL   Arousal/Participation Alert   Attention Within functional limits   Orientation Level Oriented X4   Memory Within functional limits   Following Commands Follows one step commands without difficulty   Subjective   Subjective pt reported that he overdid it with the walking yesturday   Transfers   Sit to Stand 5  Supervision   Additional items Assist x 1;Increased time required;Verbal cues   Stand to Sit 5  Supervision   Additional items Assist x 1;Increased time required;Verbal cues   Additional Comments pt denied dizziness with transitional movement   Ambulation/Elevation   Gait pattern Decreased foot clearance;Improper Weight shift;Inconsistent criss   Gait Assistance   (CGA)   Additional items Verbal cues   Assistive Device SPC   Distance 120 ft   Ambulation/Elevation Additional Comments 1 LOB however pt able to Indep recover  (pt educated on possible use of RW however choose to use SPC)   Balance   Static Sitting Normal   Dynamic Sitting Good    Static Standing Fair +   Dynamic Standing Fair   Ambulatory Fair   Endurance Deficit   Endurance Deficit Yes   Endurance Deficit Description due to foot pain   Activity Tolerance   Activity Tolerance Patient limited by fatigue;Patient limited by pain   Exercises   Heelslides Sitting;15 reps;AROM;Bilateral   Glute Sets Sitting;15 reps;AROM;Bilateral   Hip Flexion Sitting;15 reps;AROM;Bilateral   Hip Adduction Sitting;15 reps;AROM;Bilateral   Knee AROM Long Arc Quad Sitting;15 reps;AROM;Bilateral   Ankle Pumps Sitting;15 reps;AROM;Bilateral   Assessment   Prognosis Fair   Assessment Pt seen for PT treatment session this date with interventions consisting of gait training to normalize gait pattern to decrease fall risk and therapeutic exercise to improve endurance to improve functional mobility. Pt agreeable to PT treatment session upon arrival, pt found seated OOB in recliner, in no apparent distress.   Barriers during this session include pain and fatigue.  Pt continues to be functioning below baseline level, and remains limited 2* factors listed above and including decreased strength, impaired activity tolerance, impaired  balance, pain, decreased endurance, and decreased mobility.  Pt prognosis for achieving goals is good, pending pt progress with hospitalization/medical status improvements, and indicated by motivated to participate in therapy, ability to follow cues, and supportive family. PT will continue to see pt during current hospitalization in order to address the deficits listed above and provide interventions consistent w/ POC in effort to achieve goals. Current goals and POC remain appropriate, pt continues to have rehab potential  Upon conclusion pt seated OOB in recliner. The patient's AM-PAC Basic Mobility Inpatient Short Form Raw Score is 20.  A Raw score of greater than 16 suggests the patient may benefit from discharge to home. Based on patient presentations and impairments, pt would most  appropriately benefit from Level 3 resource intensity upon discharge.  Please also refer to the recommendation of the Physical Therapist for safe discharge planning. RN verbalized pt appropriate for PT session.   Goals   Patient Goals to get his surgery   LTG Expiration Date 05/18/25   PT Treatment Day 2   Plan   Treatment/Interventions Functional transfer training;LE strengthening/ROM;Patient/family training;Equipment eval/education;Gait training   Progress Progressing toward goals   PT Frequency 3-5x/wk   Discharge Recommendation   Rehab Resource Intensity Level, PT III (Minimum Resource Intensity)   Equipment Recommended Cane   AM-PAC Basic Mobility Inpatient   Turning in Flat Bed Without Bedrails 4   Lying on Back to Sitting on Edge of Flat Bed Without Bedrails 4   Moving Bed to Chair 3   Standing Up From Chair Using Arms 3   Walk in Room 3   Climb 3-5 Stairs With Railing 3   Basic Mobility Inpatient Raw Score 20   Basic Mobility Standardized Score 43.99   [facility] Highest Level Of Mobility   JH-HLM Goal 6: Walk 10 steps or more   JH-HLM Achieved 7: Walk 25 feet or more       Time In: 0939  Time Out: 1005  Total Treatment Minutes: 26    Leeanna Henao, PT

## 2025-05-10 NOTE — PLAN OF CARE
Problem: PAIN - ADULT  Goal: Verbalizes/displays adequate comfort level or baseline comfort level  Description: Interventions:- Encourage patient to monitor pain and request assistance- Assess pain using appropriate pain scale- Administer analgesics based on type and severity of pain and evaluate response- Implement non-pharmacological measures as appropriate and evaluate response- Consider cultural and social influences on pain and pain management- Notify physician/advanced practitioner if interventions unsuccessful or patient reports new pain  Outcome: Progressing    Problem: SKIN/TISSUE INTEGRITY - ADULT  Goal: Incision(s), wounds(s) or drain site(s) healing without S/S of infection  Description: INTERVENTIONS- Assess and document dressing, incision, wound bed, drain sites and surrounding tissue- Provide patient and family education- Perform skin care/dressing changes every day     Outcome: Progressing     Problem: METABOLIC, FLUID AND ELECTROLYTES - ADULT  Goal: Glucose maintained within target range  Description: INTERVENTIONS:- Monitor Blood Glucose as ordered- Assess for signs and symptoms of hyperglycemia and hypoglycemia- Administer ordered medications to maintain glucose within target range- Assess nutritional intake and initiate nutrition service referral as needed  Outcome: Progressing     Problem: HEMATOLOGIC - ADULT  Goal: Maintains hematologic stability  Description: INTERVENTIONS- Assess for signs and symptoms of bleeding or hemorrhage- Monitor labs- Administer supportive blood products/factors as ordered and appropriate  Outcome: Progressing

## 2025-05-10 NOTE — PROGRESS NOTES
"Progress Note - Hospitalist   Name: Gold Van 79 y.o. male I MRN: [id number]  Unit/Bed#: -01 I Date of Admission: 5/7/2025   Date of Service: 5/10/2025 I Hospital Day: 3    Assessment & Plan  Sepsis without acute organ dysfunction (HCC)  The patient presented with increasing pain on the right foot with history of severe peripheral vascular disease and DM ulcer wound on the right foot   The patient meets sepsis criteria with tachycardia and leukocytosis with underlying right chronic diabetic ulcer wound with cellulitis and osteomyelitis  Podiatry and vascular surgery input appreciated  Will require vascular intervention- a femoral endarterectomy with possible bypass. This is scheduled for 5/13 at [facility]  prior to podiatric surgery.   Continue cefazolin and Flagyl  Bld cx- negative at 48 hours  Monitor fever trends/WBC    Severe peripheral arterial disease (HCC)  LEADs 4/18-   Right-50 to 75% stenosis in the distal superficial femoral artery.  Occlusion versus high-grade stenosis in the distal posterior tibial artery.  Great toe pressure is below the healing range  Left-there is occlusive versus high-grade stenosis in the posterior tibial and anterior tibial arteries. Great toe pressure is below the healing range for diabetic patient.  Compared to 7/3/2023-continue stenosis and occlusion on the right.  New occlusion of the left.  Significant decrease of right RIC.  CTA (5/6) \"Focal high-grade stenosis of distal right CFA and diffuse atherosclerotic disease of right SFA resulting in moderate to severe stenoses with focal near complete occlusions at the proximal and distal segments. Short segment occlusions of proximal right anterior tibial and peroneal arteries with reconstitution in the medial to distal segments. Right posterior tibial artery is occluded. Occluded left SFA with reconstitution in the distal segment. Left anterior tibial and posterior tibial arteries are occluded. One-vessel runoff of " "the left lower extremity through the peroneal artery. Focal high-grade stenosis at the proximal right renal artery.\"  Vascular surgery input appreciated   The patient will required a femoral endarterectomy with possible bypass. This is scheduled for 5/13 at [facility].  Continue statin, aspirin, heparin infusion  Will initiate transfer on Sunday.   Type 2 diabetes mellitus with complication, without long-term current use of insulin (HCC)  Lab Results   Component Value Date    HGBA1C 9.3 (H) 04/09/2025       Recent Labs     05/09/25  1614 05/09/25  2101 05/10/25  0717 05/10/25  1058   POCGLU 411* 271* 180* 254*       Blood Sugar Average: Last 72 hrs:  (P) [id number]  Hold home regimen including glipizide, linagliptin and metformin  Placed on SSI  Blood sugars elevated in the setting of steroid use  Will add Lantus 5 U qhs  Restricted carbohydrate diet  Hypoglycemia protocol      Essential hypertension  Home regimen amlodipine 5 mg daily, atenolol 25 mg daily, and lisinopril 20 mg twice daily  Continue with current regimen at this time  Holding parameter, SBP less than 130 in the setting of sepsis  Continue to monitor blood pressure closely and adjust medication as indicated    Mild intermittent asthma without complication  In no acute exacerbation  Continue with Breztri and albuterol as needed   Pruritus  Chronic pruritus  Has been maintained on prednisone 5 mg daily  Will continue    CVA (cerebrovascular accident) (HCC)  History of CVA 9/2024  Continue stroke prevention with statin, aspirin  Hold Eliquis for now while waiting for vascular procedure planned for 5/13/25 at [facility]  Continue with heparin infusion   Ulcer of right foot with fat layer exposed (HCC)  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   Podiatry input appreciated-will likely need a partial fifth ray amputation following revascularization   Continue on cefazolin and Flagyl  Continue with local wound care     PAF (paroxysmal atrial fibrillation) ([facility])  Currently in sinus rhythm  Urology input appreciated  Continue atenolol 25 mg daily for rate control  Hold Eliquis for now, currently on heparin infusion     Cardiomyopathy ([facility])  Primary cardiologist- Dr. Ho  LVEF of 50% on TTE 10/2024  Cardiology input appreciated-patient is appropriate risk to proceed with surgery  Continue medical management   Currently not on diuretic therapy   Monitor volume status close, cautious with IV fluids      VTE Pharmacologic Prophylaxis: VTE Score: 5 High Risk (Score >/= 5) - Pharmacological DVT Prophylaxis Ordered: heparin drip. Sequential Compression Devices Ordered.    Mobility:   Basic Mobility Inpatient Raw Score: 20  JH-HLM Goal: 6: Walk 10 steps or more  JH-HLM Achieved: 7: Walk 25 feet or more  JH-HLM Goal achieved. Continue to encourage appropriate mobility.    Patient Centered Rounds: I performed bedside rounds with nursing staff today.   Discussions with Specialists or Other Care Team Provider: nursing, CM    Education and Discussions with Family / Patient:  patient updated at bedside.     Current Length of Stay: 3 day(s)  Current Patient Status: Inpatient   Certification Statement: The patient will continue to require additional inpatient hospital stay due to planned surgical intervention on 5/13/25  Discharge Plan: Anticipate discharge in >72 hrs to discharge location to be determined pending rehab evaluations.    Code Status: Level 1 - Full Code    Subjective   The patient was seen and examined.  The patient is sitting up in his chair in no acute distress.  Denies any complaints.  Patient is asking to wait to go to [facility] until Monday  as it is difficult for his wife to travel that far.    Objective :  Temp:  [97.5 °F (36.4 °C)-97.7 °F (36.5 °C)] 97.5 °F (36.4 °C)  HR:  [80-90] 80  BP: (124-140)/(65-84) 130/68  Resp:  [16-18] 18  SpO2:  [94 %-96 %] 94 %  O2 Device: None (Room air)    Body mass index is 22.38 kg/m².     Input and Output Summary (last 24 hours):     Intake/Output Summary (Last 24 hours) at 5/10/2025 1223  Last data filed at 5/10/2025 0929  Gross per 24 hour   Intake 360 ml   Output 1650 ml   Net -1290 ml       Physical Exam  Vitals and nursing note reviewed.   Constitutional:       General: He is awake.      Appearance: Normal appearance.   HENT:      Head: Normocephalic and atraumatic.   Cardiovascular:      Rate and Rhythm: Normal rate and regular rhythm.      Heart sounds: Normal heart sounds.   Pulmonary:      Effort: Pulmonary effort is normal.      Breath sounds: Normal breath sounds.   Abdominal:      Palpations: Abdomen is soft.      Tenderness: There is no abdominal tenderness.   Feet:      Comments: Right foot with dressing in place  Skin:     General: Skin is warm and dry.   Neurological:      General: No focal deficit present.      Mental Status: He is alert and oriented to person, place, and time.   Psychiatric:         Attention and Perception: Attention normal.         Mood and Affect: Mood normal.         Speech: Speech normal.         Behavior: Behavior is cooperative.         Cognition and Memory: Cognition and memory normal.           Lines/Drains:              Lab Results: I have reviewed the following results:   Results from last 7 days   Lab Units 05/10/25  0608 05/08/25  0425 05/07/25  0900   WBC Thousand/uL 8.55   < > 12.54*   HEMOGLOBIN g/dL 9.7*   < > 11.5*   HEMATOCRIT % 30.2*   < > 36.0*   PLATELETS Thousands/uL 245   < > 310   SEGS PCT %  --   --  75   LYMPHO PCT %  --   --  14   MONO PCT %  --   --  8   EOS PCT %  --   --  2    < > = values in this interval not displayed.     Results from last 7 days    Lab Units 05/10/25  0608 05/08/25  0425 05/07/25  0900   SODIUM mmol/L 133*   < > 137   POTASSIUM mmol/L 4.0   < > 3.9   CHLORIDE mmol/L 101   < > 102   CO2 mmol/L 26   < > 25   BUN mg/dL 16   < > 17   CREATININE mg/dL 0.93   < > 0.95   ANION GAP mmol/L 6   < > 10   CALCIUM mg/dL 8.6   < > 9.4   ALBUMIN g/dL  --   --  3.7   TOTAL BILIRUBIN mg/dL  --   --  0.31   ALK PHOS U/L  --   --  73   ALT U/L  --   --  10   AST U/L  --   --  9*   GLUCOSE RANDOM mg/dL 199*   < > 155*    < > = values in this interval not displayed.     Results from last 7 days   Lab Units 05/07/25  0900   INR  1.14     Results from last 7 days   Lab Units 05/10/25  1058 05/10/25  0717 05/09/25  2101 05/09/25  1614 05/09/25  1137 05/09/25  0733 05/08/25  2112 05/08/25  1633 05/08/25  1101 05/08/25  0719 05/07/25  2055 05/07/25  1557   POC GLUCOSE mg/dl 254* 180* 271* 411* 219* 165* 272* 322* 142* 168* 261* 117         Results from last 7 days   Lab Units 05/08/25  0425 05/07/25  1242 05/07/25  0900   LACTIC ACID mmol/L  --  1.5 2.4*   PROCALCITONIN ng/ml 0.11  --  0.08       Recent Cultures (last 7 days):   Results from last 7 days   Lab Units 05/07/25  0900   BLOOD CULTURE  No Growth at 48 hrs.  No Growth at 48 hrs.       Imaging Results Review: No pertinent imaging studies reviewed.  Other Study Results Review: No additional pertinent studies reviewed.    Last 24 Hours Medication List:     Current Facility-Administered Medications:     acetaminophen (TYLENOL) tablet 975 mg, Q8H SANDRA    albuterol (PROVENTIL HFA,VENTOLIN HFA) inhaler 1 puff, Q4H PRN    amLODIPine (NORVASC) tablet 5 mg, BID    atenolol (TENORMIN) tablet 25 mg, Daily    atorvastatin (LIPITOR) tablet 40 mg, QPM    Budeson-Glycopyrrol-Formoterol 160-9-4.8 MCG/ACT AERO 2 puff, BID    ceFAZolin (ANCEF) IVPB (premix in dextrose) 2,000 mg 50 mL, Q8H, Last Rate: 2,000 mg (05/10/25 0602)    famotidine (PEPCID) tablet 20 mg, BID    heparin (porcine) 25,000 units in 0.45% NaCl 250 mL  infusion (premix), Titrated, Last Rate: 24 Units/kg/hr (05/10/25 0926)    heparin (porcine) injection 2,800 Units, Q6H PRN    heparin (porcine) injection 5,600 Units, Q6H PRN    insulin glargine (LANTUS) subcutaneous injection 5 Units 0.05 mL, HS    insulin lispro (HumALOG/ADMELOG) 100 units/mL subcutaneous injection 1-5 Units, TID AC **AND** Fingerstick Glucose (POCT), TID AC    insulin lispro (HumALOG/ADMELOG) 100 units/mL subcutaneous injection 1-5 Units, HS    lisinopril (ZESTRIL) tablet 20 mg, BID    LORazepam (ATIVAN) tablet 0.5 mg, Q8H PRN    metroNIDAZOLE (FLAGYL) IVPB (premix) 500 mg 100 mL, Q12H, Last Rate: 500 mg (05/10/25 0156)    montelukast (SINGULAIR) tablet 10 mg, HS    morphine injection 2 mg, Q4H PRN    multivitamin-minerals (CENTRUM) tablet 1 tablet, Daily    oxyCODONE (ROXICODONE) IR tablet 5 mg, Q4H PRN **OR** oxyCODONE (ROXICODONE) immediate release tablet 10 mg, Q4H PRN    predniSONE tablet 5 mg, Daily    Administrative Statements   Today, Patient Was Seen By: Harley Dolan PA-C  I have spent a total time of 35 minutes in caring for this patient on the day of the visit/encounter including Documenting in the medical record, Reviewing/placing orders in the medical record (including tests, medications, and/or procedures), and Obtaining or reviewing history  .    **Please Note: This note may have been constructed using a voice recognition system.**

## 2025-05-10 NOTE — PLAN OF CARE
Problem: PHYSICAL THERAPY ADULT  Goal: Performs mobility at highest level of function for planned discharge setting.  See evaluation for individualized goals.  Description: Treatment/Interventions: Functional transfer training, LE strengthening/ROM, Elevations, Therapeutic exercise, Endurance training, Gait training  Equipment Recommended: Cane, Walker       See flowsheet documentation for full assessment, interventions and recommendations.  Outcome: Progressing  Note: Prognosis: Fair  Problem List: Decreased strength, Decreased endurance, Impaired balance, Decreased mobility, Pain  Assessment: Pt seen for PT treatment session this date with interventions consisting of gait training to normalize gait pattern to decrease fall risk and therapeutic exercise to improve endurance to improve functional mobility. Pt agreeable to PT treatment session upon arrival, pt found seated OOB in recliner, in no apparent distress.   Barriers during this session include pain and fatigue.  Pt continues to be functioning below baseline level, and remains limited 2* factors listed above and including decreased strength, impaired activity tolerance, impaired  balance, pain, decreased endurance, and decreased mobility.  Pt prognosis for achieving goals is good, pending pt progress with hospitalization/medical status improvements, and indicated by motivated to participate in therapy, ability to follow cues, and supportive family. PT will continue to see pt during current hospitalization in order to address the deficits listed above and provide interventions consistent w/ POC in effort to achieve goals. Current goals and POC remain appropriate, pt continues to have rehab potential  Upon conclusion pt seated OOB in recliner. The patient's AM-PAC Basic Mobility Inpatient Short Form Raw Score is 20.  A Raw score of greater than 16 suggests the patient may benefit from discharge to home. Based on patient presentations and impairments, pt would  most appropriately benefit from Level 3 resource intensity upon discharge.  Please also refer to the recommendation of the Physical Therapist for safe discharge planning. RN verbalized pt appropriate for PT session.        Rehab Resource Intensity Level, PT: III (Minimum Resource Intensity)    See flowsheet documentation for full assessment.

## 2025-05-10 NOTE — ASSESSMENT & PLAN NOTE
The patient presented with increasing pain on the right foot with history of severe peripheral vascular disease and DM ulcer wound on the right foot   The patient meets sepsis criteria with tachycardia and leukocytosis with underlying right chronic diabetic ulcer wound with cellulitis and osteomyelitis  Podiatry and vascular surgery input appreciated  Will require vascular intervention- a femoral endarterectomy with possible bypass. This is scheduled for 5/13 at [facility]  prior to podiatric surgery.   Continue cefazolin and Flagyl  Bld cx- negative at 48 hours  Monitor fever trends/WBC

## 2025-05-10 NOTE — ASSESSMENT & PLAN NOTE
History of CVA 9/2024  Continue stroke prevention with statin, aspirin  Hold Eliquis for now while waiting for vascular procedure planned for 5/13/25 at [facility]  Continue with heparin infusion

## 2025-05-10 NOTE — ASSESSMENT & PLAN NOTE
Lab Results   Component Value Date    HGBA1C 9.3 (H) 04/09/2025       Recent Labs     05/09/25  1614 05/09/25  2101 05/10/25  0717 05/10/25  1058   POCGLU 411* 271* 180* 254*       Blood Sugar Average: Last 72 hrs:  (P) 231.7910881105482303  Hold home regimen including glipizide, linagliptin and metformin  Placed on SSI  Blood sugars elevated in the setting of steroid use  Will add Lantus 5 U qhs  Restricted carbohydrate diet  Hypoglycemia protocol

## 2025-05-11 ENCOUNTER — HOSPITAL ENCOUNTER (INPATIENT)
Facility: HOSPITAL | Age: 80
LOS: 17 days | Discharge: NURSING, REHAB OR LONG-TERM CARE | DRG: 853 | End: 2025-05-28
Attending: INTERNAL MEDICINE | Admitting: INTERNAL MEDICINE
Payer: COMMERCIAL

## 2025-05-11 VITALS
WEIGHT: 165 LBS | OXYGEN SATURATION: 93 % | TEMPERATURE: 98.2 F | BODY MASS INDEX: 22.35 KG/M2 | RESPIRATION RATE: 16 BRPM | SYSTOLIC BLOOD PRESSURE: 143 MMHG | DIASTOLIC BLOOD PRESSURE: 80 MMHG | HEIGHT: 72 IN | HEART RATE: 79 BPM

## 2025-05-11 DIAGNOSIS — I73.9 PERIPHERAL ARTERIAL DISEASE (HCC): ICD-10-CM

## 2025-05-11 DIAGNOSIS — M86.9 OSTEOMYELITIS OF RIGHT FOOT, UNSPECIFIED TYPE (HCC): ICD-10-CM

## 2025-05-11 DIAGNOSIS — I73.9 SEVERE PERIPHERAL ARTERIAL DISEASE (HCC): ICD-10-CM

## 2025-05-11 DIAGNOSIS — L29.9 GENERALIZED PRURITUS: ICD-10-CM

## 2025-05-11 DIAGNOSIS — M86.471 CHRONIC OSTEOMYELITIS OF RIGHT FOOT WITH DRAINING SINUS (HCC): ICD-10-CM

## 2025-05-11 DIAGNOSIS — L97.512 ULCER OF RIGHT FOOT WITH FAT LAYER EXPOSED (HCC): Primary | ICD-10-CM

## 2025-05-11 DIAGNOSIS — I10 ESSENTIAL HYPERTENSION: Chronic | ICD-10-CM

## 2025-05-11 DIAGNOSIS — R79.89 ELEVATED TROPONIN: ICD-10-CM

## 2025-05-11 DIAGNOSIS — E11.8 TYPE 2 DIABETES MELLITUS WITH COMPLICATION, WITHOUT LONG-TERM CURRENT USE OF INSULIN (HCC): ICD-10-CM

## 2025-05-11 DIAGNOSIS — R06.02 SHORTNESS OF BREATH: ICD-10-CM

## 2025-05-11 LAB
ANION GAP SERPL CALCULATED.3IONS-SCNC: 7 MMOL/L (ref 4–13)
APTT PPP: 47 SECONDS (ref 23–34)
APTT PPP: 67 SECONDS (ref 23–34)
BUN SERPL-MCNC: 15 MG/DL (ref 5–25)
CALCIUM SERPL-MCNC: 8.7 MG/DL (ref 8.4–10.2)
CHLORIDE SERPL-SCNC: 101 MMOL/L (ref 96–108)
CO2 SERPL-SCNC: 25 MMOL/L (ref 21–32)
CREAT SERPL-MCNC: 0.94 MG/DL (ref 0.6–1.3)
ERYTHROCYTE [DISTWIDTH] IN BLOOD BY AUTOMATED COUNT: 13.6 % (ref 11.6–15.1)
GFR SERPL CREATININE-BSD FRML MDRD: 76 ML/MIN/1.73SQ M
GLUCOSE SERPL-MCNC: 194 MG/DL (ref 65–140)
GLUCOSE SERPL-MCNC: 212 MG/DL (ref 65–140)
GLUCOSE SERPL-MCNC: 231 MG/DL (ref 65–140)
GLUCOSE SERPL-MCNC: 245 MG/DL (ref 65–140)
GLUCOSE SERPL-MCNC: 365 MG/DL (ref 65–140)
HCT VFR BLD AUTO: 31.7 % (ref 36.5–49.3)
HGB BLD-MCNC: 10.2 G/DL (ref 12–17)
MCH RBC QN AUTO: 28.9 PG (ref 26.8–34.3)
MCHC RBC AUTO-ENTMCNC: 32.2 G/DL (ref 31.4–37.4)
MCV RBC AUTO: 90 FL (ref 82–98)
PLATELET # BLD AUTO: 254 THOUSANDS/UL (ref 149–390)
PMV BLD AUTO: 9.2 FL (ref 8.9–12.7)
POTASSIUM SERPL-SCNC: 4.2 MMOL/L (ref 3.5–5.3)
RBC # BLD AUTO: 3.53 MILLION/UL (ref 3.88–5.62)
SODIUM SERPL-SCNC: 133 MMOL/L (ref 135–147)
WBC # BLD AUTO: 8.24 THOUSAND/UL (ref 4.31–10.16)

## 2025-05-11 PROCEDURE — 99238 HOSP IP/OBS DSCHRG MGMT 30/<: CPT | Performed by: PHYSICIAN ASSISTANT

## 2025-05-11 PROCEDURE — 82948 REAGENT STRIP/BLOOD GLUCOSE: CPT

## 2025-05-11 PROCEDURE — 85730 THROMBOPLASTIN TIME PARTIAL: CPT | Performed by: NURSE PRACTITIONER

## 2025-05-11 PROCEDURE — 85730 THROMBOPLASTIN TIME PARTIAL: CPT

## 2025-05-11 PROCEDURE — 99222 1ST HOSP IP/OBS MODERATE 55: CPT

## 2025-05-11 PROCEDURE — 80048 BASIC METABOLIC PNL TOTAL CA: CPT | Performed by: PHYSICIAN ASSISTANT

## 2025-05-11 PROCEDURE — 85027 COMPLETE CBC AUTOMATED: CPT | Performed by: PHYSICIAN ASSISTANT

## 2025-05-11 RX ORDER — ACETAMINOPHEN 325 MG/1
975 TABLET ORAL EVERY 8 HOURS SCHEDULED
Status: DISCONTINUED | OUTPATIENT
Start: 2025-05-11 | End: 2025-05-26

## 2025-05-11 RX ORDER — HEPARIN SODIUM 1000 [USP'U]/ML
2800 INJECTION, SOLUTION INTRAVENOUS; SUBCUTANEOUS EVERY 6 HOURS PRN
Status: DISCONTINUED | OUTPATIENT
Start: 2025-05-11 | End: 2025-05-13

## 2025-05-11 RX ORDER — LISINOPRIL 20 MG/1
20 TABLET ORAL 2 TIMES DAILY
Status: CANCELLED | OUTPATIENT
Start: 2025-05-11

## 2025-05-11 RX ORDER — CEFAZOLIN SODIUM 2 G/50ML
2000 SOLUTION INTRAVENOUS EVERY 8 HOURS
Status: CANCELLED | OUTPATIENT
Start: 2025-05-11

## 2025-05-11 RX ORDER — INSULIN LISPRO 100 [IU]/ML
1-5 INJECTION, SOLUTION INTRAVENOUS; SUBCUTANEOUS
Status: CANCELLED | OUTPATIENT
Start: 2025-05-11

## 2025-05-11 RX ORDER — INSULIN GLARGINE 100 [IU]/ML
5 INJECTION, SOLUTION SUBCUTANEOUS
Status: DISCONTINUED | OUTPATIENT
Start: 2025-05-12 | End: 2025-05-14

## 2025-05-11 RX ORDER — INSULIN LISPRO 100 [IU]/ML
5 INJECTION, SOLUTION INTRAVENOUS; SUBCUTANEOUS
Status: DISCONTINUED | OUTPATIENT
Start: 2025-05-12 | End: 2025-05-16

## 2025-05-11 RX ORDER — INSULIN LISPRO 100 [IU]/ML
5 INJECTION, SOLUTION INTRAVENOUS; SUBCUTANEOUS
Status: CANCELLED | OUTPATIENT
Start: 2025-05-11

## 2025-05-11 RX ORDER — LISINOPRIL 20 MG/1
20 TABLET ORAL 2 TIMES DAILY
Status: DISCONTINUED | OUTPATIENT
Start: 2025-05-12 | End: 2025-05-13

## 2025-05-11 RX ORDER — ATENOLOL 25 MG/1
25 TABLET ORAL DAILY
Status: DISCONTINUED | OUTPATIENT
Start: 2025-05-12 | End: 2025-05-14 | Stop reason: SDUPTHER

## 2025-05-11 RX ORDER — ATORVASTATIN CALCIUM 40 MG/1
40 TABLET, FILM COATED ORAL EVERY EVENING
Status: DISCONTINUED | OUTPATIENT
Start: 2025-05-12 | End: 2025-05-14 | Stop reason: SDUPTHER

## 2025-05-11 RX ORDER — ALBUTEROL SULFATE 90 UG/1
1 INHALANT RESPIRATORY (INHALATION) EVERY 4 HOURS PRN
Status: CANCELLED | OUTPATIENT
Start: 2025-05-11

## 2025-05-11 RX ORDER — OXYCODONE HYDROCHLORIDE 10 MG/1
10 TABLET ORAL EVERY 4 HOURS PRN
Refills: 0 | Status: CANCELLED | OUTPATIENT
Start: 2025-05-11

## 2025-05-11 RX ORDER — INSULIN GLARGINE 100 [IU]/ML
5 INJECTION, SOLUTION SUBCUTANEOUS
Status: CANCELLED | OUTPATIENT
Start: 2025-05-11

## 2025-05-11 RX ORDER — AMLODIPINE BESYLATE 5 MG/1
5 TABLET ORAL 2 TIMES DAILY
Status: DISCONTINUED | OUTPATIENT
Start: 2025-05-12 | End: 2025-05-13

## 2025-05-11 RX ORDER — INSULIN LISPRO 100 [IU]/ML
1-5 INJECTION, SOLUTION INTRAVENOUS; SUBCUTANEOUS
Status: DISCONTINUED | OUTPATIENT
Start: 2025-05-12 | End: 2025-05-28 | Stop reason: HOSPADM

## 2025-05-11 RX ORDER — HEPARIN SODIUM 10000 [USP'U]/100ML
3-30 INJECTION, SOLUTION INTRAVENOUS
Status: CANCELLED | OUTPATIENT
Start: 2025-05-11

## 2025-05-11 RX ORDER — OXYCODONE HYDROCHLORIDE 5 MG/1
5 TABLET ORAL EVERY 4 HOURS PRN
Refills: 0 | Status: DISCONTINUED | OUTPATIENT
Start: 2025-05-11 | End: 2025-05-28 | Stop reason: HOSPADM

## 2025-05-11 RX ORDER — LORAZEPAM 0.5 MG/1
0.5 TABLET ORAL EVERY 8 HOURS PRN
Status: CANCELLED | OUTPATIENT
Start: 2025-05-11

## 2025-05-11 RX ORDER — HEPARIN SODIUM 1000 [USP'U]/ML
2800 INJECTION, SOLUTION INTRAVENOUS; SUBCUTANEOUS EVERY 6 HOURS PRN
Status: CANCELLED | OUTPATIENT
Start: 2025-05-11

## 2025-05-11 RX ORDER — METRONIDAZOLE 500 MG/100ML
500 INJECTION, SOLUTION INTRAVENOUS EVERY 12 HOURS
Status: DISCONTINUED | OUTPATIENT
Start: 2025-05-12 | End: 2025-05-17

## 2025-05-11 RX ORDER — OXYCODONE HYDROCHLORIDE 10 MG/1
10 TABLET ORAL EVERY 4 HOURS PRN
Refills: 0 | Status: DISCONTINUED | OUTPATIENT
Start: 2025-05-11 | End: 2025-05-28 | Stop reason: HOSPADM

## 2025-05-11 RX ORDER — HEPARIN SODIUM 1000 [USP'U]/ML
5600 INJECTION, SOLUTION INTRAVENOUS; SUBCUTANEOUS EVERY 6 HOURS PRN
Status: CANCELLED | OUTPATIENT
Start: 2025-05-11

## 2025-05-11 RX ORDER — PREDNISONE 5 MG/1
5 TABLET ORAL DAILY
Status: DISCONTINUED | OUTPATIENT
Start: 2025-05-12 | End: 2025-05-28 | Stop reason: HOSPADM

## 2025-05-11 RX ORDER — ATORVASTATIN CALCIUM 40 MG/1
40 TABLET, FILM COATED ORAL EVERY EVENING
Status: CANCELLED | OUTPATIENT
Start: 2025-05-11

## 2025-05-11 RX ORDER — FAMOTIDINE 20 MG/1
20 TABLET, FILM COATED ORAL 2 TIMES DAILY
Status: CANCELLED | OUTPATIENT
Start: 2025-05-11

## 2025-05-11 RX ORDER — HEPARIN SODIUM 1000 [USP'U]/ML
5600 INJECTION, SOLUTION INTRAVENOUS; SUBCUTANEOUS EVERY 6 HOURS PRN
Status: DISCONTINUED | OUTPATIENT
Start: 2025-05-11 | End: 2025-05-13

## 2025-05-11 RX ORDER — PREDNISONE 1 MG/1
5 TABLET ORAL DAILY
Status: CANCELLED | OUTPATIENT
Start: 2025-05-12

## 2025-05-11 RX ORDER — MONTELUKAST SODIUM 10 MG/1
10 TABLET ORAL
Status: CANCELLED | OUTPATIENT
Start: 2025-05-11

## 2025-05-11 RX ORDER — ACETAMINOPHEN 325 MG/1
975 TABLET ORAL EVERY 8 HOURS SCHEDULED
Status: CANCELLED | OUTPATIENT
Start: 2025-05-11

## 2025-05-11 RX ORDER — LORAZEPAM 0.5 MG/1
0.5 TABLET ORAL EVERY 8 HOURS PRN
Status: DISCONTINUED | OUTPATIENT
Start: 2025-05-11 | End: 2025-05-13

## 2025-05-11 RX ORDER — OXYCODONE HYDROCHLORIDE 5 MG/1
5 TABLET ORAL EVERY 4 HOURS PRN
Refills: 0 | Status: CANCELLED | OUTPATIENT
Start: 2025-05-11

## 2025-05-11 RX ORDER — METRONIDAZOLE 500 MG/100ML
500 INJECTION, SOLUTION INTRAVENOUS EVERY 12 HOURS
Status: CANCELLED | OUTPATIENT
Start: 2025-05-12

## 2025-05-11 RX ORDER — CEFAZOLIN SODIUM 2 G/50ML
2000 SOLUTION INTRAVENOUS EVERY 8 HOURS
Status: DISCONTINUED | OUTPATIENT
Start: 2025-05-11 | End: 2025-05-20

## 2025-05-11 RX ORDER — MONTELUKAST SODIUM 10 MG/1
10 TABLET ORAL
Status: DISCONTINUED | OUTPATIENT
Start: 2025-05-12 | End: 2025-05-28 | Stop reason: HOSPADM

## 2025-05-11 RX ORDER — AMLODIPINE BESYLATE 5 MG/1
5 TABLET ORAL 2 TIMES DAILY
Status: CANCELLED | OUTPATIENT
Start: 2025-05-11

## 2025-05-11 RX ORDER — ATENOLOL 25 MG/1
25 TABLET ORAL DAILY
Status: CANCELLED | OUTPATIENT
Start: 2025-05-12

## 2025-05-11 RX ORDER — FAMOTIDINE 20 MG/1
20 TABLET, FILM COATED ORAL 2 TIMES DAILY
Status: DISCONTINUED | OUTPATIENT
Start: 2025-05-12 | End: 2025-05-28 | Stop reason: HOSPADM

## 2025-05-11 RX ORDER — HEPARIN SODIUM 10000 [USP'U]/100ML
3-30 INJECTION, SOLUTION INTRAVENOUS
Status: DISCONTINUED | OUTPATIENT
Start: 2025-05-11 | End: 2025-05-13

## 2025-05-11 RX ORDER — ALBUTEROL SULFATE 90 UG/1
1 INHALANT RESPIRATORY (INHALATION) EVERY 4 HOURS PRN
Status: DISCONTINUED | OUTPATIENT
Start: 2025-05-11 | End: 2025-05-28 | Stop reason: HOSPADM

## 2025-05-11 RX ADMIN — ATORVASTATIN CALCIUM 40 MG: 40 TABLET, FILM COATED ORAL at 17:32

## 2025-05-11 RX ADMIN — LISINOPRIL 20 MG: 20 TABLET ORAL at 21:46

## 2025-05-11 RX ADMIN — ACETAMINOPHEN 975 MG: 325 TABLET ORAL at 05:52

## 2025-05-11 RX ADMIN — FAMOTIDINE 20 MG: 20 TABLET, FILM COATED ORAL at 17:32

## 2025-05-11 RX ADMIN — OXYCODONE HYDROCHLORIDE 10 MG: 10 TABLET ORAL at 16:11

## 2025-05-11 RX ADMIN — METRONIDAZOLE 500 MG: 500 INJECTION, SOLUTION INTRAVENOUS at 16:16

## 2025-05-11 RX ADMIN — FAMOTIDINE 20 MG: 20 TABLET, FILM COATED ORAL at 08:10

## 2025-05-11 RX ADMIN — PREDNISONE 5 MG: 1 TABLET ORAL at 08:06

## 2025-05-11 RX ADMIN — AMLODIPINE BESYLATE 5 MG: 5 TABLET ORAL at 21:46

## 2025-05-11 RX ADMIN — INSULIN LISPRO 2 UNITS: 100 INJECTION, SOLUTION INTRAVENOUS; SUBCUTANEOUS at 21:46

## 2025-05-11 RX ADMIN — BUDESONIDE, GLYCOPYRROLATE, AND FORMOTEROL FUMARATE 2 PUFF: 160; 9; 4.8 AEROSOL, METERED RESPIRATORY (INHALATION) at 08:11

## 2025-05-11 RX ADMIN — INSULIN LISPRO 2 UNITS: 100 INJECTION, SOLUTION INTRAVENOUS; SUBCUTANEOUS at 11:54

## 2025-05-11 RX ADMIN — MONTELUKAST 10 MG: 10 TABLET, FILM COATED ORAL at 21:46

## 2025-05-11 RX ADMIN — INSULIN LISPRO 4 UNITS: 100 INJECTION, SOLUTION INTRAVENOUS; SUBCUTANEOUS at 16:12

## 2025-05-11 RX ADMIN — INSULIN LISPRO 5 UNITS: 100 INJECTION, SOLUTION INTRAVENOUS; SUBCUTANEOUS at 07:46

## 2025-05-11 RX ADMIN — INSULIN LISPRO 5 UNITS: 100 INJECTION, SOLUTION INTRAVENOUS; SUBCUTANEOUS at 11:54

## 2025-05-11 RX ADMIN — METRONIDAZOLE 500 MG: 500 INJECTION, SOLUTION INTRAVENOUS at 02:02

## 2025-05-11 RX ADMIN — OXYCODONE HYDROCHLORIDE 10 MG: 10 TABLET ORAL at 21:46

## 2025-05-11 RX ADMIN — INSULIN LISPRO 5 UNITS: 100 INJECTION, SOLUTION INTRAVENOUS; SUBCUTANEOUS at 16:13

## 2025-05-11 RX ADMIN — CEFAZOLIN SODIUM 2000 MG: 2 SOLUTION INTRAVENOUS at 23:33

## 2025-05-11 RX ADMIN — ATENOLOL 25 MG: 25 TABLET ORAL at 08:11

## 2025-05-11 RX ADMIN — OXYCODONE HYDROCHLORIDE 10 MG: 10 TABLET ORAL at 05:52

## 2025-05-11 RX ADMIN — ACETAMINOPHEN 975 MG: 325 TABLET, FILM COATED ORAL at 23:33

## 2025-05-11 RX ADMIN — AMLODIPINE BESYLATE 5 MG: 5 TABLET ORAL at 08:10

## 2025-05-11 RX ADMIN — CEFAZOLIN SODIUM 2000 MG: 2 SOLUTION INTRAVENOUS at 16:16

## 2025-05-11 RX ADMIN — Medication 1 TABLET: at 08:10

## 2025-05-11 RX ADMIN — INSULIN LISPRO 2 UNITS: 100 INJECTION, SOLUTION INTRAVENOUS; SUBCUTANEOUS at 07:46

## 2025-05-11 RX ADMIN — ACETAMINOPHEN 975 MG: 325 TABLET ORAL at 16:00

## 2025-05-11 RX ADMIN — HEPARIN SODIUM 24 UNITS/KG/HR: 10000 INJECTION, SOLUTION INTRAVENOUS at 23:31

## 2025-05-11 RX ADMIN — CEFAZOLIN SODIUM 2000 MG: 2 SOLUTION INTRAVENOUS at 05:52

## 2025-05-11 RX ADMIN — HEPARIN SODIUM 34.29 UNITS/KG/HR: 10000 INJECTION, SOLUTION INTRAVENOUS at 15:04

## 2025-05-11 RX ADMIN — LISINOPRIL 20 MG: 20 TABLET ORAL at 08:10

## 2025-05-11 RX ADMIN — INSULIN GLARGINE 5 UNITS: 100 INJECTION, SOLUTION SUBCUTANEOUS at 21:45

## 2025-05-11 RX ADMIN — BUDESONIDE, GLYCOPYRROLATE, AND FORMOTEROL FUMARATE 2 PUFF: 160; 9; 4.8 AEROSOL, METERED RESPIRATORY (INHALATION) at 21:48

## 2025-05-11 NOTE — ASSESSMENT & PLAN NOTE
History of CVA 9/2024  Continue stroke prevention with statin, aspirin  Hold Eliquis for now while waiting for vascular procedure planned for 5/13/25 at [facility]  Continue with heparin infusion

## 2025-05-11 NOTE — ASSESSMENT & PLAN NOTE
Lab Results   Component Value Date    HGBA1C 9.3 (H) 04/09/2025       Recent Labs     05/10/25  1704 05/10/25  2039 05/11/25  0745 05/11/25  1152   POCGLU 423* 212* 194* 231*       Blood Sugar Average: Last 72 hrs:  (P) [id number]  Hold home regimen including glipizide, linagliptin and metformin  Placed on SSI  Blood sugars elevated in the setting of steroid use  Add Lantus 5 U qhs  Added humalog 5 U TID with meals   Restricted carbohydrate diet  Hypoglycemia protocol

## 2025-05-11 NOTE — ASSESSMENT & PLAN NOTE
"LEADs 4/18-   Right-50 to 75% stenosis in the distal superficial femoral artery.  Occlusion versus high-grade stenosis in the distal posterior tibial artery.  Great toe pressure is below the healing range  Left-there is occlusive versus high-grade stenosis in the posterior tibial and anterior tibial arteries. Great toe pressure is below the healing range for diabetic patient.  Compared to 7/3/2023-continue stenosis and occlusion on the right.  New occlusion of the left.  Significant decrease of right RIC.  CTA Abdomen with runoff(5/6)   \"Focal high-grade stenosis of distal right CFA and diffuse atherosclerotic disease of right SFA resulting in moderate to severe stenoses with focal near complete occlusions at the proximal and distal segments. Short segment occlusions of proximal right anterior tibial and peroneal arteries with reconstitution in the medial to distal segments. Right posterior tibial artery is occluded. Occluded left SFA with reconstitution in the distal segment. Left anterior tibial and posterior tibial arteries are occluded. One-vessel runoff of the left lower extremity through the peroneal artery. Focal high-grade stenosis at the proximal right renal artery.\"  Vascular surgery input appreciated   The patient will required a femoral endarterectomy with possible bypass. This is scheduled for 5/13 at [facility].  Continue statin, aspirin, heparin infusion  "

## 2025-05-11 NOTE — PLAN OF CARE
INTERVENTIONS:- Educate patient/family on patient safety including physical limitations- Instruct patient to call for assistance with activity - Consult OT/PT to assist with strengthening/mobility - Keep Call bell within reach- Keep bed low and locked with side rails adjusted as appropriate- Keep care items and personal belongings within reach- Initiate and maintain comfort rounds- Make Fall Risk Sign visible to staff- Offer Toileting every 2 Hours, in advance of need- Initiate/Maintain bed alarm- Obtain necessary fall risk management equipment: socks- Apply yellow socks and bracelet for high fall risk patients- Consider moving patient to room near nurses station  Outcome: Progressing     Problem: INFECTION - ADULT  Goal: Absence or prevention of progression during hospitalization  Description: INTERVENTIONS:- Assess and monitor for signs and symptoms of infection- Monitor lab/diagnostic results- Monitor all insertion sites, i.e. indwelling lines, tubes, and drains- Monitor endotracheal if appropriate and nasal secretions for changes in amount and color- [place] appropriate cooling/warming therapies per order- Administer medications as ordered- Instruct and encourage patient and family to use good hand hygiene technique- Identify and instruct in appropriate isolation precautions for identified infection/condition  Outcome: Progressing     Problem: SAFETY ADULT  Goal: Maintain or return to baseline ADL function  Description: INTERVENTIONS:-  Assess patient's ability to carry out ADLs; assess patient's baseline for ADL function and identify physical deficits which impact ability to perform ADLs (bathing, care of mouth/teeth, toileting, grooming, dressing, etc.)- Assess/evaluate cause of self-care deficits - Assess range of motion- Assess patient's mobility; develop plan if impaired- Assess patient's need for assistive devices and provide as appropriate- Encourage maximum independence but intervene and supervise  when necessary- Involve family in performance of ADLs- Assess for home care needs following discharge - Consider OT consult to assist with ADL evaluation and planning for discharge- Provide patient education as appropriate  Outcome: Progressing     Problem: HEMATOLOGIC - ADULT  Goal: Maintains hematologic stability  Description: INTERVENTIONS- Assess for signs and symptoms of bleeding or hemorrhage- Monitor labs- Administer supportive blood products/factors as ordered and appropriate  Outcome: Progressing     Problem: Nutrition/Hydration-ADULT  Goal: Nutrient/Hydration intake appropriate for improving, restoring or maintaining nutritional needs  Description: Monitor and assess patient's nutrition/hydration status for malnutrition. Collaborate with interdisciplinary team and initiate plan and interventions as ordered.  Monitor patient's weight and dietary intake as ordered or per policy. Utilize nutrition screening tool and intervene as necessary. Determine patient's food preferences and provide high-protein, high-caloric foods as appropriate. INTERVENTIONS:- Monitor oral intake, urinary output, labs, and treatment plans- Assess nutrition and hydration status and recommend course of action- Evaluate amount of meals eaten- Assist patient with eating if necessary - Allow adequate time for meals- Recommend/ encourage appropriate diets, oral nutritional supplements, and vitamin/mineral supplements- Order, calculate, and assess calorie counts as needed- Recommend, monitor, and adjust tube feedings and TPN/PPN based on assessed needs- Assess need for intravenous fluids- Provide specific nutrition/hydration education as appropriate- Include patient/family/caregiver in decisions related to nutrition  Outcome: Progressing

## 2025-05-11 NOTE — ASSESSMENT & PLAN NOTE
The patient presented with increasing pain on the right foot with history of severe peripheral vascular disease and DM ulcer wound on the right foot   The patient meets sepsis criteria with tachycardia and leukocytosis with underlying right chronic diabetic ulcer wound with cellulitis and osteomyelitis  Continue cefazolin and Flagyl  Bld cx- negative at 72 hours  Podiatry and vascular surgery input appreciated  The patient will require vascular intervention- a femoral endarterectomy with possible bypass. This is scheduled for 5/13 at [facility]  prior to podiatric surgery.   The patient will need higher level of care given vascular procedure not able to be formed at [facility].  Given this Dr. Aiken requested the patient be transferred to [facility].  The patient's case was discussed with Dr. Watson who accepted the patient onto [facility] service.

## 2025-05-11 NOTE — DISCHARGE SUMMARY
"Discharge Summary - Hospitalist   Name: Gold Van 79 y.o. male I MRN: [id number]  Unit/Bed#: -01 I Date of Admission: 5/7/2025   Date of Service: 5/11/2025 I Hospital Day: 4     Assessment & Plan  Sepsis without acute organ dysfunction (HCC)  The patient presented with increasing pain on the right foot with history of severe peripheral vascular disease and DM ulcer wound on the right foot   The patient meets sepsis criteria with tachycardia and leukocytosis with underlying right chronic diabetic ulcer wound with cellulitis and osteomyelitis  Continue cefazolin and Flagyl  Bld cx- negative at 72 hours  Podiatry and vascular surgery input appreciated  The patient will require vascular intervention- a femoral endarterectomy with possible bypass. This is scheduled for 5/13 at [facility]  prior to podiatric surgery.   The patient will need higher level of care given vascular procedure not able to be formed at [facility].  Given this Dr. Aiken requested the patient be transferred to [facility].  The patient's case was discussed with Dr. Watson who accepted the patient onto [facility] service.  Severe peripheral arterial disease (HCC)  LEADs 4/18-   Right-50 to 75% stenosis in the distal superficial femoral artery.  Occlusion versus high-grade stenosis in the distal posterior tibial artery.  Great toe pressure is below the healing range  Left-there is occlusive versus high-grade stenosis in the posterior tibial and anterior tibial arteries. Great toe pressure is below the healing range for diabetic patient.  Compared to 7/3/2023-continue stenosis and occlusion on the right.  New occlusion of the left.  Significant decrease of right RIC.  CTA Abdomen with runoff(5/6)   \"Focal high-grade stenosis of distal right CFA and diffuse atherosclerotic disease of right SFA resulting in moderate to severe stenoses with focal near complete occlusions at the proximal and distal segments. Short segment " "occlusions of proximal right anterior tibial and peroneal arteries with reconstitution in the medial to distal segments. Right posterior tibial artery is occluded. Occluded left SFA with reconstitution in the distal segment. Left anterior tibial and posterior tibial arteries are occluded. One-vessel runoff of the left lower extremity through the peroneal artery. Focal high-grade stenosis at the proximal right renal artery.\"  Vascular surgery input appreciated   The patient will required a femoral endarterectomy with possible bypass. This is scheduled for 5/13 at [facility].  Continue statin, aspirin, heparin infusion  Type 2 diabetes mellitus with complication, without long-term current use of insulin ([facility])  Lab Results   Component Value Date    HGBA1C 9.3 (H) 04/09/2025       Recent Labs     05/10/25  1704 05/10/25  2039 05/11/25  0745 05/11/25  1152   POCGLU 423* 212* 194* 231*       Blood Sugar Average: Last 72 hrs:  (P) [id number]  Hold home regimen including glipizide, linagliptin and metformin  Placed on SSI  Blood sugars elevated in the setting of steroid use  Add Lantus 5 U qhs  Added humalog 5 U TID with meals   Restricted carbohydrate diet  Hypoglycemia protocol      Essential hypertension  Home regimen amlodipine 5 mg daily, atenolol 25 mg daily, and lisinopril 20 mg twice daily  Continue with current regimen at this time  Holding parameter, SBP less than 130 in the setting of sepsis  Continue to monitor blood pressure closely and adjust medication as indicated    Mild intermittent asthma without complication  In no acute exacerbation  Continue with Breztri and albuterol as needed   Pruritus  Chronic pruritus  Has been maintained on prednisone 5 mg daily  Will continue    CVA (cerebrovascular accident) ([facility])  History of CVA 9/2024  Continue stroke prevention with statin, aspirin  Hold Eliquis for now while waiting for vascular procedure planned for 5/13/25 at [facility] [place]  Continue with heparin infusion   Ulcer of right foot with fat layer exposed (HCC)  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   Podiatry input appreciated-will likely need a partial fifth ray amputation following revascularization   Continue on cefazolin and Flagyl  Continue with local wound care     PAF (paroxysmal atrial fibrillation) ([facility])  Currently in sinus rhythm  Urology input appreciated  Continue atenolol 25 mg daily for rate control  Hold Eliquis for now, currently on heparin infusion     Cardiomyopathy ([facility])  Primary cardiologist- Dr. Ho  LVEF of 50% on TTE 10/2024  Cardiology input appreciated-patient is appropriate risk to proceed with surgery  Continue medical management   Currently not on diuretic therapy   Monitor volume status close, cautious with IV fluids       Medical Problems       Resolved Problems  Date Reviewed: 5/8/2025   None       Discharging Physician / Practitioner: Harley Dolan PA-C  PCP: Shayne Diaz MD  Admission Date:   Admission Orders (From admission, onward)       Ordered        05/07/25 1144  INPATIENT ADMISSION  Once                          Discharge/Transfer Date: 05/11/25    Disposition:   Transfer to: [facility]  Reason for Transfer: Planned vascular procedure  Accepting Provider at [facility]: Dr. Watson    Consultations During Hospital Stay:  Vascular surgery  Cardiology  Podiatry    Procedures Performed:   None    Significant Findings / Test Results:   XR foot 3+ views RIGHT  Result Date: 5/7/2025  Impression: Ulceration along the lateral aspect of the foot and soft tissue swelling along the dorsum of the foot without radiographic findings of  osteomyelitis. Computerized Assisted Algorithm (CAA) may have been used to analyze all applicable images. Workstation performed: [id number]     VAS ARTERIAL DUPLEX-LOWER LIMB BILATERAL  Result Date: 4/18/25  RIGHT LOWER LIMB:  There is a >75% stenosis noted in the proximal superficial femoral artery and a  50-75% stenosis in the distal superficial femoral artery. Occlusion vs high  grade stenosis noted in the distal posterior tibial artery    Incidental Findings:   none     Test Results Pending at Discharge (will require follow up):   none     Complications:  none    Reason for Admission: right foot osteomyelitis    Hospital Course:   Gold Van is a 79 y.o. male patient who originally presented to the hospital on 5/7/2025 due to chronic right foot ulcer.  Please see H&P for complete details of presentation.  X-ray of right foot was concerning for osteomyelitis.  The patient was started on IV cefazolin and Flagyl.  The patient's Eliquis was held and he was started on a heparin infusion.  Podiatry and vascular surgery were consulted.  Podiatry requesting vascular surgery intervention prior to any procedures.  Cardiology was consulted for cardiac clearance.  Vascular surgery recommending patient have a femoral endarterectomy and possible bypass.  Given this could not be done at [facility].  It was recommended the patient be transferred for higher level of care at [facility].  The patient was tentatively scheduled with  on 5/13/2025.  The patient's case was discussed with Dr. Watson at [facility] for acceptance onto [facility] service.      Please see above list of diagnoses and related plan for additional information.     Condition at Discharge: good    Discharge Day Visit / Exam:   Subjective:    Vitals: Blood Pressure: 135/75 (05/11/25 0700)  Pulse: 90 (05/11/25 0814)  Temperature: 97.8 °F (36.6 °C) (05/11/25 0656)  Temp Source: Oral (05/10/25 0087)  Respirations: 16 (05/11/25  0656)  Height: 6' (182.9 cm) (05/08/25 2017)  Weight - Scale: 74.8 kg (165 lb) (05/08/25 2017)  SpO2: 95 % (05/11/25 0814)  Physical Exam  Vitals and nursing note reviewed.   Constitutional:       Appearance: Normal appearance.   HENT:      Head: Normocephalic and atraumatic.      Nose: Nose normal.      Mouth/Throat:      Mouth: Mucous membranes are dry.   Cardiovascular:      Rate and Rhythm: Normal rate and regular rhythm.   Pulmonary:      Effort: Pulmonary effort is normal.      Breath sounds: Normal breath sounds.   Abdominal:      General: There is no distension.      Palpations: Abdomen is soft.      Tenderness: There is no abdominal tenderness.   Skin:     General: Skin is warm and dry.      Comments: Right foot with dressing in place   Neurological:      General: No focal deficit present.      Mental Status: He is alert and oriented to person, place, and time.   Psychiatric:         Mood and Affect: Mood normal.         Behavior: Behavior normal.         Discussion with Family: Updated  (wife) at bedside.     Administrative Statements   Discharge Statement:  I have spent a total time of 25 minutes in caring for this patient on the day of the visit/encounter. >30 minutes of time was spent on: Patient and family education, Counseling / Coordination of care, Documenting in the medical record, Reviewing / ordering tests, medicine, procedures  , and Communicating with other healthcare professionals .    **Please Note: This note may have been constructed using a voice recognition system.**

## 2025-05-12 ENCOUNTER — ANESTHESIA EVENT (INPATIENT)
Dept: PERIOP | Facility: HOSPITAL | Age: 80
End: 2025-05-12
Payer: COMMERCIAL

## 2025-05-12 LAB
APTT PPP: 66 SECONDS (ref 23–34)
APTT PPP: 70 SECONDS (ref 23–34)
BACTERIA BLD CULT: NORMAL
BACTERIA BLD CULT: NORMAL
GLUCOSE SERPL-MCNC: 162 MG/DL (ref 65–140)
GLUCOSE SERPL-MCNC: 207 MG/DL (ref 65–140)
GLUCOSE SERPL-MCNC: 274 MG/DL (ref 65–140)
GLUCOSE SERPL-MCNC: 329 MG/DL (ref 65–140)

## 2025-05-12 PROCEDURE — 99232 SBSQ HOSP IP/OBS MODERATE 35: CPT | Performed by: INTERNAL MEDICINE

## 2025-05-12 PROCEDURE — 82948 REAGENT STRIP/BLOOD GLUCOSE: CPT

## 2025-05-12 PROCEDURE — NC001 PR NO CHARGE: Performed by: PODIATRIST

## 2025-05-12 PROCEDURE — 99232 SBSQ HOSP IP/OBS MODERATE 35: CPT | Performed by: SURGERY

## 2025-05-12 PROCEDURE — 97163 PT EVAL HIGH COMPLEX 45 MIN: CPT

## 2025-05-12 PROCEDURE — NC001 PR NO CHARGE: Performed by: NURSE PRACTITIONER

## 2025-05-12 PROCEDURE — 85730 THROMBOPLASTIN TIME PARTIAL: CPT

## 2025-05-12 PROCEDURE — 85730 THROMBOPLASTIN TIME PARTIAL: CPT | Performed by: INTERNAL MEDICINE

## 2025-05-12 PROCEDURE — 97166 OT EVAL MOD COMPLEX 45 MIN: CPT

## 2025-05-12 RX ORDER — CHLORHEXIDINE GLUCONATE ORAL RINSE 1.2 MG/ML
15 SOLUTION DENTAL
Status: DISCONTINUED | OUTPATIENT
Start: 2025-05-13 | End: 2025-05-13 | Stop reason: HOSPADM

## 2025-05-12 RX ORDER — CEFAZOLIN SODIUM 2 G/50ML
2000 SOLUTION INTRAVENOUS ONCE
Status: CANCELLED | OUTPATIENT
Start: 2025-05-12 | End: 2025-05-12

## 2025-05-12 RX ORDER — CHLORHEXIDINE GLUCONATE ORAL RINSE 1.2 MG/ML
15 SOLUTION DENTAL ONCE
Status: DISCONTINUED | OUTPATIENT
Start: 2025-05-12 | End: 2025-05-12

## 2025-05-12 RX ORDER — ASPIRIN 81 MG/1
81 TABLET, CHEWABLE ORAL DAILY
Status: DISCONTINUED | OUTPATIENT
Start: 2025-05-12 | End: 2025-05-13

## 2025-05-12 RX ADMIN — FAMOTIDINE 20 MG: 20 TABLET, FILM COATED ORAL at 08:40

## 2025-05-12 RX ADMIN — FAMOTIDINE 20 MG: 20 TABLET, FILM COATED ORAL at 17:00

## 2025-05-12 RX ADMIN — INSULIN GLARGINE 5 UNITS: 100 INJECTION, SOLUTION SUBCUTANEOUS at 21:30

## 2025-05-12 RX ADMIN — ASPIRIN 81 MG: 81 TABLET, CHEWABLE ORAL at 11:55

## 2025-05-12 RX ADMIN — ATORVASTATIN CALCIUM 40 MG: 40 TABLET, FILM COATED ORAL at 17:00

## 2025-05-12 RX ADMIN — INSULIN LISPRO 3 UNITS: 100 INJECTION, SOLUTION INTRAVENOUS; SUBCUTANEOUS at 21:32

## 2025-05-12 RX ADMIN — CEFAZOLIN SODIUM 2000 MG: 2 SOLUTION INTRAVENOUS at 22:38

## 2025-05-12 RX ADMIN — BUDESONIDE, GLYCOPYRROLATE, AND FORMOTEROL FUMARATE 2 PUFF: 160; 9; 4.8 AEROSOL, METERED RESPIRATORY (INHALATION) at 08:40

## 2025-05-12 RX ADMIN — ACETAMINOPHEN 975 MG: 325 TABLET, FILM COATED ORAL at 05:16

## 2025-05-12 RX ADMIN — BUDESONIDE, GLYCOPYRROLATE, AND FORMOTEROL FUMARATE 2 PUFF: 160; 9; 4.8 AEROSOL, METERED RESPIRATORY (INHALATION) at 21:29

## 2025-05-12 RX ADMIN — INSULIN LISPRO 3 UNITS: 100 INJECTION, SOLUTION INTRAVENOUS; SUBCUTANEOUS at 16:58

## 2025-05-12 RX ADMIN — HEPARIN SODIUM 26 UNITS/KG/HR: 10000 INJECTION, SOLUTION INTRAVENOUS at 00:08

## 2025-05-12 RX ADMIN — INSULIN LISPRO 1 UNITS: 100 INJECTION, SOLUTION INTRAVENOUS; SUBCUTANEOUS at 11:56

## 2025-05-12 RX ADMIN — PREDNISONE 5 MG: 5 TABLET ORAL at 08:40

## 2025-05-12 RX ADMIN — HEPARIN SODIUM 2800 UNITS: 1000 INJECTION, SOLUTION INTRAVENOUS; SUBCUTANEOUS at 00:06

## 2025-05-12 RX ADMIN — MONTELUKAST 10 MG: 10 TABLET, FILM COATED ORAL at 21:29

## 2025-05-12 RX ADMIN — HEPARIN SODIUM 26 UNITS/KG/HR: 10000 INJECTION, SOLUTION INTRAVENOUS at 06:10

## 2025-05-12 RX ADMIN — ATENOLOL 25 MG: 50 TABLET ORAL at 08:43

## 2025-05-12 RX ADMIN — INSULIN LISPRO 5 UNITS: 100 INJECTION, SOLUTION INTRAVENOUS; SUBCUTANEOUS at 16:57

## 2025-05-12 RX ADMIN — ACETAMINOPHEN 975 MG: 325 TABLET, FILM COATED ORAL at 21:29

## 2025-05-12 RX ADMIN — INSULIN LISPRO 1 UNITS: 100 INJECTION, SOLUTION INTRAVENOUS; SUBCUTANEOUS at 08:40

## 2025-05-12 RX ADMIN — AMLODIPINE BESYLATE 5 MG: 5 TABLET ORAL at 08:40

## 2025-05-12 RX ADMIN — ACETAMINOPHEN 975 MG: 325 TABLET, FILM COATED ORAL at 15:12

## 2025-05-12 RX ADMIN — CEFAZOLIN SODIUM 2000 MG: 2 SOLUTION INTRAVENOUS at 15:12

## 2025-05-12 RX ADMIN — METRONIDAZOLE 500 MG: 500 INJECTION, SOLUTION INTRAVENOUS at 16:58

## 2025-05-12 RX ADMIN — HEPARIN SODIUM 26 UNITS/KG/HR: 10000 INJECTION, SOLUTION INTRAVENOUS at 21:30

## 2025-05-12 RX ADMIN — OXYCODONE HYDROCHLORIDE 10 MG: 10 TABLET ORAL at 11:55

## 2025-05-12 RX ADMIN — LISINOPRIL 20 MG: 20 TABLET ORAL at 08:40

## 2025-05-12 RX ADMIN — CEFAZOLIN SODIUM 2000 MG: 2 SOLUTION INTRAVENOUS at 06:10

## 2025-05-12 RX ADMIN — INSULIN LISPRO 5 UNITS: 100 INJECTION, SOLUTION INTRAVENOUS; SUBCUTANEOUS at 08:40

## 2025-05-12 RX ADMIN — OXYCODONE HYDROCHLORIDE 10 MG: 10 TABLET ORAL at 22:43

## 2025-05-12 RX ADMIN — Medication 1 TABLET: at 08:40

## 2025-05-12 RX ADMIN — INSULIN LISPRO 5 UNITS: 100 INJECTION, SOLUTION INTRAVENOUS; SUBCUTANEOUS at 11:56

## 2025-05-12 RX ADMIN — METRONIDAZOLE 500 MG: 500 INJECTION, SOLUTION INTRAVENOUS at 05:16

## 2025-05-12 NOTE — ASSESSMENT & PLAN NOTE
Primary cardiologist - Dr. Ho  LVEF of 50% on TTE 10/2024  Cardiology recommendations - patient is appropriate risk to proceed with surgery  Currently not on diuretic therapy - monitor volume status closely

## 2025-05-12 NOTE — PROGRESS NOTES
Progress Note - Hospitalist   Name: Gold Van 79 y.o. male I MRN: [id number]  Unit/Bed#: E5 -01 I Date of Admission: 5/11/2025   Date of Service: 5/12/2025 I Hospital Day: 1    Assessment & Plan  Sepsis without acute organ dysfunction (HCC)  History of diabetes mellitus paroxysmal atrial fibrillation asthma cardiomyopathy and PAD presented to [facility] with right foot wound  Met sepsis criteria at [facility]  Currently on cefazolin/metronidazole  Blood cultures no growth  Transferred here for vascular surgery.  Podiatry planning on right partial fifth ray resection  Severe peripheral arterial disease (HCC)  Transferred here for vascular surgery intervention  Vascular surgery planning femoral endarterectomy/bypass tomorrow  Type 2 diabetes mellitus with complication, without long-term current use of insulin ([facility])  Lab Results   Component Value Date    HGBA1C 9.3 (H) 04/09/2025     Recent Labs     05/11/25  2137 05/12/25  0710 05/12/25  1113 05/12/25  1557   POCGLU 245* 162* 207* 329*     Prior to admission glipizide linagliptin and metformin  Started on glargine 5 units with lispro 5 units 3 times daily during hospitalization  PAF (paroxysmal atrial fibrillation) (HCC)  Continue atenolol.  Anticoagulation: Apixaban on hold for anticipated vascular procedure  Currently on heparin infusion  Essential hypertension  Continue amlodipine atenolol and lisinopril  Mild intermittent asthma without complication  Prior to admission on [facility].  No exacerbation  Pruritus  Chronic pruritus follows with dermatology as an outpatient on prednisone 5 mg daily  Cardiomyopathy (HCC)  Last known LVEF 50% echocardiogram 2024  Currently compensated    VTE Pharmacologic Prophylaxis: VTE Score: 5 High Risk (Score >/= 5) - Pharmacological DVT Prophylaxis Ordered: heparin drip. Sequential Compression Devices Ordered.    Mobility:   Basic Mobility Inpatient Raw Score: 21  -HLM Goal: 6: Walk 10 steps or more  -HLM  Achieved: 7: Walk 25 feet or more  -HLM Goal achieved. Continue to encourage appropriate mobility.    Patient Centered Rounds: I have performed bedside rounds with nursing staff today.  Discussions with Specialists or Other Care Team Provider: Case management    Education and Discussions with Family / Patient:     Current Length of Stay: 1 day(s)  Current Patient Status: Inpatient   Certification Statement: The patient will continue to require additional inpatient hospital stay due to vascular surgery tomorrow  Discharge Plan: Anticipate discharge in >72 hrs to home.    Code Status: Level 1 - Full Code    Subjective   Patient seen and examined.  No chest pain or shortness of breath    Objective   Vitals:   Temp (24hrs), Av.1 °F (36.7 °C), Min:97.9 °F (36.6 °C), Max:98.2 °F (36.8 °C)    Temp:  [97.9 °F (36.6 °C)-98.2 °F (36.8 °C)] 98.1 °F (36.7 °C)  HR:  [68-91] 76  Resp:  [16-18] 16  BP: (139-151)/(65-80) 143/65  SpO2:  [93 %-96 %] 96 %  Body mass index is 21.66 kg/m².     Input and Output Summary (last 24 hours):     Intake/Output Summary (Last 24 hours) at 2025 1745  Last data filed at 2025 1720  Gross per 24 hour   Intake 540 ml   Output 800 ml   Net -260 ml       Physical Exam  Vitals reviewed.   Constitutional:       General: He is not in acute distress.  HENT:      Head: Atraumatic.   Eyes:      Extraocular Movements: Extraocular movements intact.   Cardiovascular:      Rate and Rhythm: Regular rhythm.      Heart sounds:      No gallop.   Pulmonary:      Effort: Pulmonary effort is normal. No respiratory distress.   Abdominal:      General: Bowel sounds are normal.      Palpations: Abdomen is soft.      Tenderness: There is no abdominal tenderness.   Musculoskeletal:         General: No deformity.   Skin:     General: Skin is warm.      Coloration: Skin is not jaundiced.   Neurological:      General: No focal deficit present.      Mental Status: He is alert.      Motor: No weakness.    Psychiatric:         Mood and Affect: Mood normal.       Lines/Drains:  Invasive Devices       Peripheral Intravenous Line  Duration             Peripheral IV Right;Ventral (anterior) Forearm -- days    Peripheral IV 05/11/25 Left Antecubital 1 day                            Lab Results: I have reviewed the following results:   Results from last 7 days   Lab Units 05/11/25  0549 05/10/25  0608 05/09/25  0543 05/08/25  0425 05/07/25  0900   WBC Thousand/uL 8.24 8.55 9.00   < > 12.54*   HEMOGLOBIN g/dL 10.2* 9.7* 10.4*   < > 11.5*   PLATELETS Thousands/uL 254 245 257   < > 310   MCV fL 90 90 89   < > 92   INR   --   --   --   --  1.14    < > = values in this interval not displayed.     Results from last 7 days   Lab Units 05/11/25  0549 05/10/25  0608 05/09/25  0543 05/08/25 0425 05/07/25  0900   SODIUM mmol/L 133* 133* 135   < > 137   POTASSIUM mmol/L 4.2 4.0 4.0   < > 3.9   CHLORIDE mmol/L 101 101 104   < > 102   CO2 mmol/L 25 26 24   < > 25   ANION GAP mmol/L 7 6 7   < > 10   BUN mg/dL 15 16 15   < > 17   CREATININE mg/dL 0.94 0.93 0.91   < > 0.95   CALCIUM mg/dL 8.7 8.6 8.9   < > 9.4   ALBUMIN g/dL  --   --   --   --  3.7   TOTAL BILIRUBIN mg/dL  --   --   --   --  0.31   ALK PHOS U/L  --   --   --   --  73   ALT U/L  --   --   --   --  10   AST U/L  --   --   --   --  9*   EGFR ml/min/1.73sq m 76 77 79   < > 75   GLUCOSE RANDOM mg/dL 212* 199* 175*   < > 155*    < > = values in this interval not displayed.                      Results from last 7 days   Lab Units 05/08/25 0425 05/07/25  1242 05/07/25  0900   LACTIC ACID mmol/L  --  1.5 2.4*   PROCALCITONIN ng/ml 0.11  --  0.08     Results from last 7 days   Lab Units 05/12/25  1557 05/12/25  1113 05/12/25  0710 05/11/25  2137 05/11/25  1609 05/11/25  1152 05/11/25  0745 05/10/25  2039 05/10/25  1704 05/10/25  1058 05/10/25  0717 05/09/25  2101   POC GLUCOSE mg/dl 329* 207* 162* 245* 365* 231* 194* 212* 423* 254* 180* 271*               Recent Cultures (last 7  days):   Results from last 7 days   Lab Units 05/07/25  0900   BLOOD CULTURE  No Growth After 4 Days.  No Growth After 4 Days.       Imaging:  Reviewed radiology reports from this admission including:    CTA abdominal w run off w wo contrast  Result Date: 5/7/2025  Impression: 1. Focal high-grade stenosis of distal right CFA and diffuse atherosclerotic disease of right SFA resulting in moderate to severe stenoses with focal near complete occlusions at the proximal and distal segments. 2. Short segment occlusions of proximal right anterior tibial and peroneal arteries with reconstitution in the medial to distal segments. Right posterior tibial artery is occluded. 3. Occluded left SFA with reconstitution in the distal segment. Left anterior tibial and posterior tibial arteries are occluded. One-vessel runoff of the left lower extremity through the peroneal artery. 4. Focal high-grade stenosis at the proximal right renal artery. Workstation performed: [id number]     XR foot 3+ views RIGHT  Result Date: 5/7/2025  Impression: Ulceration along the lateral aspect of the foot and soft tissue swelling along the dorsum of the foot without radiographic findings of osteomyelitis. Computerized Assisted Algorithm (CAA) may have been used to analyze all applicable images. Workstation performed: [id number]           Last 24 Hours Medication List:     Current Facility-Administered Medications:     acetaminophen (TYLENOL) tablet 975 mg, Q8H SANDRA    albuterol (PROVENTIL HFA,VENTOLIN HFA) inhaler 1 puff, Q4H PRN    amLODIPine (NORVASC) tablet 5 mg, BID    aspirin chewable tablet 81 mg, Daily    atenolol (TENORMIN) tablet 25 mg, Daily    atorvastatin (LIPITOR) tablet 40 mg, QPM    Budeson-Glycopyrrol-Formoterol 160-9-4.8 MCG/ACT AERO 2 puff, BID    ceFAZolin (ANCEF) IVPB (premix in dextrose) 2,000 mg 50 mL, Q8H, Last Rate: 2,000 mg (05/12/25 1512)    [START ON 5/13/2025] chlorhexidine (PERIDEX) 0.12 % oral rinse 15 mL, On Call To OR     famotidine (PEPCID) tablet 20 mg, BID    heparin (porcine) 25,000 units in 0.45% NaCl 250 mL infusion (premix), Titrated, Last Rate: 26 Units/kg/hr (05/12/25 0610)    heparin (porcine) injection 2,800 Units, Q6H PRN    heparin (porcine) injection 5,600 Units, Q6H PRN    insulin glargine (LANTUS) subcutaneous injection 5 Units 0.05 mL, HS    insulin lispro (HumALOG/ADMELOG) 100 units/mL subcutaneous injection 1-5 Units, TID AC **AND** Fingerstick Glucose (POCT), TID AC    insulin lispro (HumALOG/ADMELOG) 100 units/mL subcutaneous injection 1-5 Units, HS    insulin lispro (HumALOG/ADMELOG) 100 units/mL subcutaneous injection 5 Units, TID With Meals    lisinopril (ZESTRIL) tablet 20 mg, BID    LORazepam (ATIVAN) tablet 0.5 mg, Q8H PRN    metroNIDAZOLE (FLAGYL) IVPB (premix) 500 mg 100 mL, Q12H, Last Rate: 500 mg (05/12/25 1658)    montelukast (SINGULAIR) tablet 10 mg, HS    morphine injection 2 mg, Q4H PRN    multivitamin-minerals (CENTRUM) tablet 1 tablet, Daily    oxyCODONE (ROXICODONE) IR tablet 5 mg, Q4H PRN **OR** oxyCODONE (ROXICODONE) immediate release tablet 10 mg, Q4H PRN    predniSONE tablet 5 mg, Daily    Administrative Statements   Today, Patient Was Seen By: Bean Sweeney, DO  I have spent a total time of 35 minutes in caring for this patient on the day of the visit/encounter including Diagnostic results, Counseling / Coordination of care, Documenting in the medical record, Reviewing/placing orders in the medical record (including tests, medications, and/or procedures), Obtaining or reviewing history  , and Communicating with other healthcare professionals .    **Please Note: This note may have been constructed using a voice recognition system.**

## 2025-05-12 NOTE — PLAN OF CARE
Problem: OCCUPATIONAL THERAPY ADULT  Goal: Performs self-care activities at highest level of function for planned discharge setting.  See evaluation for individualized goals.  Description: Treatment Interventions: ADL retraining, Functional transfer training, Endurance training, Patient/family training, Equipment evaluation/education, Compensatory technique education, Continued evaluation, Energy conservation, Activityengagement          See flowsheet documentation for full assessment, interventions and recommendations.   Note: Limitation: Decreased ADL status, Decreased endurance, Decreased self-care trans, Decreased high-level ADLs (balance)  Prognosis: Good  Assessment: Patient is a 79 y.o. year old male seen for OT eval s/p admit to [facility] on 5/11/2025 with sepsis without acute organ dysfunction, atherosclerosis of native arteries of R leg with ulceration of heel and midfoot - pending vascular intervention. Per podiatry, currently WBAT RLE.  OT consulted to assess ADLs/IADLs/functional mobility and assist w/ D/C planning. Patient demonstrates the following deficits impacting occupational performance: decreased balance, decreased activity tolerance, decreased safety awareness, increased pain, impaired coordination, and decreased skin integrity . These impairments, as well at pt’s NEERU home environment, difficulty performing ADLs, difficulty performing IADLs, difficulty performing transfers/mobility, fall risk , functional decline , and increased reliance on DME , limit pt’s ability to safely engage in all baseline areas of occupation. Pt's CLOF as follows: eating/grooming: Independent and Albin, UB ADLs: Albin, LB ADLs: supervision , toileting: supervision , bed mobility: DNT, functional transfers: Albin, functional mobility: supervision , sitting/standing tolerance: ~4 min. Pt would benefit from continued skilled OT while in acute setting to address deficits as defined above and to maximize (I) w/ ADLs/functional  mobility. Occupational performance areas to address include: grooming, bathing/shower, toilet hygiene, dressing, health maintenance, functional mobility, community mobility, clothing management, cleaning, meal prep, and household maintenance. Based on the aforementioned evaluation, functional performance deficits, and assessments, pt has been identified as a moderate complexity evaluation. At this time, recommendation for pt to receive post-acute rehabilitation services at a Level III (minimum resource intensity) due to above deficits and CLOF. OT will continue to follow pt 1-2x/wk to address the goals listed below to  w/in 10-14 days.     Rehab Resource Intensity Level, OT: III (Minimum Resource Intensity)

## 2025-05-12 NOTE — CONSULTS
Consultation - Vascular Surgery   Name: Gold Van 79 y.o. male I MRN: [id number]  Unit/Bed#: E5 -01 I Date of Admission: 5/11/2025   Date of Service: 5/12/2025 I Hospital Day: 1   Consults  Physician Requesting Evaluation: Bean Sweeney DO   Reason for Evaluation / Principal Problem: R 5th toe wound in the setting of PAD    Assessment & Plan  Severe peripheral arterial disease (HCC)    Ulcer of right foot with fat layer exposed (HCC)    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 (HCC)  Pt is a 78 yo male ex-smoker with PMH of T2DM, HTN, HLD, Asthma, CVA, PAF, and PAD who presented to [facility] ED 5/7 with R foot pain x1w and non-healing wound. Pt was transferred to [facility] for vascular intervention on 5/11.     Vascular surgery consulted for intervention of RLE for a non-healing foot wound in the setting of PAD.     Diagnostics:  -Vein mapping 5/7: R GSV is patent from groin to ankle, 2.2mm to 12.6mm. L GSV is patent from groin to ankle, 2.1mm to 9mm.   -Venous duplex 5/7: No DVT, superficial thrombosis, and monophasic waveforms in Pop, PT, and AT in RLE.   -CTA 5/6: R: EDY/EIA/IIA patent with atherosclerosis, Focal high-grade stenosis distal CFA, diffuse calcifications in SFA with near occlusions in proximal and distal segments. L: EDY/EIA patent with atherosclerotic calcifications, high grade stenosis at origin of IIA, SFA occluded with reconstitution in distal segment, Pop diffuse atherosclerosis, high grade stenosis TPT.    Plan:  -Plan for R fem endarterectomy and antegrade endovascular intervention with possible bypass scheduled for tomorrow 5/13  -Please keep NPO at [place] tonight  -Cont Lipitor  -Start ASA 81mg  -Cont to hold eliquis for OR tomorrow, Hold heparin 4-6 hours prior to OR  -Podiatry consulted, input appreciated  -Cardiology consulted at [facility] for surgical risk stratification: patient is appropriate risk to proceed with surgery   -Remainder of care  per primary, input appreciated  -Will discuss with Dr. Galicia      History of Present Illness   Gold Van is a 79 y.o. male who presents with R 5th digit non-healing wound in the setting of PAD. He has mild pain in the R foot which is worse when walking. He has weakness in both LE and residual weakness of the R arm from prior CVA. LE weakness is worse with activity but denies cramping or pain. Denies HA, fever, chills, CP, SOB, N/V, abdominal pain, and LLE pain.     Review of Systems   Constitutional:  Negative for chills and fever.   HENT:  Negative for ear pain and sore throat.    Eyes:  Negative for pain and visual disturbance.   Respiratory:  Negative for cough and shortness of breath.    Cardiovascular:  Negative for chest pain and palpitations.   Gastrointestinal:  Negative for abdominal pain and vomiting.   Genitourinary:  Negative for dysuria and hematuria.   Musculoskeletal:  Positive for myalgias. Negative for arthralgias and back pain.   Skin:  Positive for wound. Negative for color change and rash.   Neurological:  Negative for seizures and syncope.   All other systems reviewed and are negative.    Medical History Review: I have reviewed the patient's PMH, PSH, Social History, Family History, Meds, and Allergies     Objective :  Temp:  [97.9 °F (36.6 °C)-98.2 °F (36.8 °C)] 98 °F (36.7 °C)  HR:  [68-91] 82  BP: (139-151)/(78-80) 139/79  Resp:  [18] 18  SpO2:  [93 %-96 %] 96 %  O2 Device: None (Room air)    I/O         05/10 0701 05/11 0700 05/11 0701  05/12 0700 05/12 0701  05/13 0700    Urine (mL/kg/hr)  800     Total Output  800     Net  -800                    Physical Exam  Vitals and nursing note reviewed.   Constitutional:       General: He is not in acute distress.     Appearance: He is well-developed.   HENT:      Head: Normocephalic and atraumatic.   Eyes:      Conjunctiva/sclera: Conjunctivae normal.   Cardiovascular:      Rate and Rhythm: Normal rate and regular rhythm.      Heart  sounds: Normal heart sounds. No murmur heard.     Comments: L DP signal with doppler  Pulmonary:      Effort: Pulmonary effort is normal. No respiratory distress.      Breath sounds: Normal breath sounds.   Abdominal:      Palpations: Abdomen is soft.      Tenderness: There is no abdominal tenderness.   Musculoskeletal:         General: No swelling.      Cervical back: Neck supple.   Skin:     General: Skin is warm and dry.      Capillary Refill: Capillary refill takes less than 2 seconds.      Comments: R 5th digit non-healing wound.    Neurological:      General: No focal deficit present.      Mental Status: He is alert. Mental status is at baseline.   Psychiatric:         Mood and Affect: Mood normal.         Thought Content: Thought content normal.         Judgment: Judgment normal.          R foot      Lab Results: I have reviewed the following results:  Recent Labs     05/11/25  0549 05/11/25  2327 05/12/25  0603   WBC 8.24  --   --    HGB 10.2*  --   --    HCT 31.7*  --   --      --   --    SODIUM 133*  --   --    K 4.2  --   --      --   --    CO2 25  --   --    BUN 15  --   --    CREATININE 0.94  --   --    GLUC 212*  --   --    PTT 67*   < > 66*    < > = values in this interval not displayed.

## 2025-05-12 NOTE — ASSESSMENT & PLAN NOTE
Home regimen amlodipine 5 mg daily, atenolol 25 mg daily, and lisinopril 20 mg twice daily  Continue home regimen with hold parameters SBP less than 130 in the setting of sepsis  Monitor BP

## 2025-05-12 NOTE — PROGRESS NOTES
Patient was discharged to [facility] at 2200 via EMS w/ stretcher. Discharge instructions were explained & ample time was given to answer any questions. Masimo was removed. Heparin gtt is patent. All belongings were sent with the patient. Report was given to oncoming nurse, JEN, RN.

## 2025-05-12 NOTE — CASE MANAGEMENT
Case Management Assessment & Discharge Planning Note    Patient name Gold Van  Location East 5 /E5 -* MRN [id number]  : 1945 Date 2025       Current Admission Date: 2025  Current Admission Diagnosis:Sepsis without acute organ dysfunction (HCC)   Patient Active Problem List    Diagnosis Date Noted Date Diagnosed    Sepsis without acute organ dysfunction ([facility]) 2025     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 ([facility]) 2025     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 ([facility]) 2025     PAF (paroxysmal atrial fibrillation) ([facility]) 2025     Cardiomyopathy ([facility]) 2025     Ulcer of right foot with fat layer exposed ([facility]) 2025     Severe peripheral arterial disease ([facility]) 2024     Moderate protein-calorie malnutrition ([facility]) 10/09/2024     Gastroesophageal reflux disease without esophagitis 10/04/2024     Impaired mobility and activities of daily living 10/04/2024     Neuropathy 10/04/2024     Foot drop, left 10/04/2024     Abnormal echocardiogram 10/03/2024     CVA (cerebrovascular accident) ([facility]) 10/02/2024     Dysmetria 10/01/2024     COVID-19 2024     Acute respiratory insufficiency 2024     Shortness of breath 2024     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     History of pneumothorax 2024     Acute respiratory failure with hypoxia ([facility]) 2024     Non-recurrent bilateral inguinal hernia without obstruction or gangrene 2024     Pruritus 2024     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 2021     Hyponatremia 2021     Pulmonary nodule 2021     Type 2 diabetes mellitus with complication, without long-term current use of insulin ([facility]) 10/23/2017     Essential hypertension 10/23/2017     Mild intermittent asthma without complication 10/23/2017     Mixed hyperlipidemia 10/23/2017       LOS (days): 1  Geometric Mean LOS  (GMLOS) (days):   Days to GMLOS:     OBJECTIVE:    Risk of Unplanned Readmission Score: 24.13      Current admission status: Inpatient    Preferred Pharmacy:   [facility] [place] - [address]  [address]  [place] [address]  Phone: [phone] Fax: [phone]    [facility] [place] - [address]  [address]  [facility] [address]  Phone: [phone] Fax: [phone]    [facility] ([facility]) #[id number] [place] - [address] [address]  Phone: [phone] Fax: [phone]    Primary Care Provider: Shayne Diaz MD    Primary Insurance: [facility] REP  Secondary Insurance:     ASSESSMENT:  Active Health Care Proxies       Mya Van Health Care Representative - Spouse   Primary Phone: [phone] (Mobile)  Home Phone: [phone]            Readmission Root Cause  30 Day Readmission: No    Patient Information  Admitted from:: Home  Mental Status: Alert  During Assessment patient was accompanied by: Spouse  Assessment information provided by:: Spouse  Primary Caregiver: Family  Caregiver's Name:: Mya Van  Caregiver's Relationship to Patient:: Significant Other  Caregiver's Telephone Number:: [phone]  Support Systems: Spouse/significant other  County of Residence: [facility] do you live in[place]  Home entry access options. Select all that apply.: Garage, Stairs  Number of steps to enter home.: One Flight  Type of Current Residence[place] [email or web address]  Living Arrangements: Lives w/ Spouse/significant other  Is patient a ?: No    Activities of Daily Living Prior to Admission  Functional Status: Independent  Completes ADLs independently?: Yes  Ambulates independently?: Yes  Does patient use assisted devices?: Yes  Assisted Devices (DME) used: Straight Cane  Does patient currently own DME?: Yes  What DME does the patient currently  own?: Straight Cane  Does patient have a history of Outpatient Therapy (PT/OT)?: Yes  Does the patient have a history of Short-Term Rehab?: Yes ([facility])  Does patient have a history of HHC?: No  Does patient currently have HHC?: No    Patient Information Continued  Does patient have prescription coverage?: Yes ([facility] for immediate med needs)  Can the patient afford their medications and any related supplies (such as glucometers or test strips)?: Yes  Does patient receive dialysis treatments?: No  Does patient have a history of substance abuse?: No  Does patient have a history of Mental Health Diagnosis?: No    Means of Transportation  Means of Transport to Appts:: Drives Self    DISCHARGE DETAILS:    Discharge planning discussed with:: pt's spouse  Freedom of Choice: Yes  Comments - Freedom of Choice: HHC referrals sent on Aidin but will follow if therapy recommendations change.  CM contacted family/caregiver?: Yes  Were Treatment Team discharge recommendations reviewed with patient/caregiver?: Yes     Were patient/caregiver advised of the risks associated with not following Treatment Team discharge recommendations?: Yes    Contacts  Patient Contacts: Mya Van  Relationship to Patient:: Family  Contact Method: Phone  Phone Number: [phone]  Reason/Outcome: Continuity of Care, Discharge Planning    Other Referral/Resources/Interventions Provided:  Interventions: HHC    Treatment Team Recommendation: Home with Home Health Care  Discharge Destination Plan:: Home with Home Health Care     Additional Comments: Cm spoke with pt's SO via phone. Pt is a transfer from [facility] and was assessed by CM on 5/8/25. Pt's spouse confirmed all the information gathered is still accurate. Pt [place] with [place] from the [facility]. He lives with his spouse. He is ind at baseline and uses a cane. He is current with outpt PT. He has no hhc hx but did stay at [facility] in the past.  Pt has no MH or D/A hx. Pt uses [email or web address] for incidental rx needs and uses a mail service through [employer] for routine maintenance medications. Pt drives himself typically. PT/OT are currently recommending home therapy for pt so cm sent referral on Aidin, however pt is undergoing a femoral endarerectomy with possible bypass tomorrow and then a potential 5th ray amputation later on. CM will continue to follow if therapy recommendatiosn change or for other dc needs. Pt's spouse emphasized to cm that if pt needs rehab, it will need to be closer to their house in [facility].

## 2025-05-12 NOTE — PLAN OF CARE
Problem: PHYSICAL THERAPY ADULT  Goal: Performs mobility at highest level of function for planned discharge setting.  See evaluation for individualized goals.  Description: Treatment/Interventions: Functional transfer training, LE strengthening/ROM, Elevations, Therapeutic exercise, Endurance training, Patient/family training, Bed mobility, Gait training, Spoke to nursing, OT  Equipment Recommended: Walker (pt has)       See flowsheet documentation for full assessment, interventions and recommendations.  Note: Prognosis: Good  Problem List: Decreased strength, Decreased endurance, Impaired balance, Decreased mobility, Pain  Assessment: Pt. 79 y.o.male admitted for Sepsis without acute organ dysfunction (HCC) w/ severe PAD & R foot ulcer. Pt transferred from [facility] to [facility] for vascular consult. RLE vascular intervention pending. Plan for right partial 5th ray resection PENDING vascular intervention. Pt referred to PT for mobility assessment & D/C planning. Please see above for information re: home set-up & PLOF as well as objective findings during PT assessment. PTA, pt reports being I w/ SPC. On eval, pt functioning below baseline hence will continue skilled PT to improve function & safety. Pt require modified I w/ transfers & S for amb w/ RW + cues for techniques & safety. Gait deviations as above but no gross LOB noted.  Dec amb tolerance 2* to R foot pain. The patient's AM-PAC Basic Mobility Inpatient Short Form Raw Score is 21. A Raw score of greater than 16 suggests the patient may benefit from discharge to home. Please also refer to the recommendation of the Physical Therapist for safe discharge planning. From PT standpoint, will recommend Level III (minimum resource intensity) rehab services and inc family support at D/C, pending surgical procedures. No SOB & dizziness reported t/o session. Nsg staff most recent vital signs as follows: /79 (BP Location: Right arm)   Pulse 82   Temp 98 °F (36.7  °C) (Oral)   Resp 18   Wt 72.4 kg (159 lb 11.2 oz)   SpO2 96%   BMI 21.66 kg/m² . At end of session, pt remain OOB in chair in stable condition, call bell & phone in reach, all lines intact. Fall precautions reinforced w/ good understanding. CM to follow. Nsg staff to continue to mobilized pt (OOB in chair for all meals & ambulate in room/unit) as tolerated to prevent further decline in function. Will also recommend Restorative for daily amb &/or daily OOB in chair as appropriate. Nsg notified. Co-eval was necessary to complete this PT eval for the pt's best interest given pt's medical acuity & complexity.        Rehab Resource Intensity Level, PT: III (Minimum Resource Intensity) (pending surgical procedures)    See flowsheet documentation for full assessment.

## 2025-05-12 NOTE — PHYSICAL THERAPY NOTE
PT EVALUATION    Pt. Name: Gold Van  Pt. Age: 79 y.o.  MRN: [id number]  LENGTH OF STAY: 1      Admitting Diagnoses:   Sepsis without acute organ dysfunction (HCC) [A41.9]    Past Medical History:   Diagnosis Date    Asthma     COVID-19     CVA (cerebral vascular accident) (HCC)     Diabetes mellitus (HCC)     Environmental allergies     Hyperlipidemia     Hypertension     Macular degeneration 07/13/2020    right eye    Orthostatic hypotension     Osteopenia     Pneumonia     Pneumothorax     Sinusitis        Past Surgical History:   Procedure Laterality Date    CARPAL TUNNEL RELEASE Left 08/2024    CATARACT EXTRACTION Left 07/2024    COLONOSCOPY      KNEE CARTILAGE SURGERY Right 1964    VASECTOMY      WISDOM TOOTH EXTRACTION      x4       Imaging Studies:  No orders to display         05/12/25 1045   PT Last Visit   PT Visit Date 05/12/25   Note Type   Note type Evaluation   Pain Assessment   Pain Assessment Tool FLACC   Pain Score No Pain  (0/10 at rest; pt reports pain during amb, please see FLACC for details)   Pain Location/Orientation Orientation: Right;Location: Foot   Pain Rating: FLACC (Rest) - Face 0   Pain Rating: FLACC (Rest) - Legs 0   Pain Rating: FLACC (Rest) - Activity 0   Pain Rating: FLACC (Rest) - Cry 0   Pain Rating: FLACC (Rest) - Consolability 0   Score: FLACC (Rest) 0   Pain Rating: FLACC (Activity) - Face 1   Pain Rating: FLACC (Activity) - Legs 1   Pain Rating: FLACC (Activity) - Activity 1   Pain Rating: FLACC (Activity) - Cry 0   Pain Rating: FLACC (Activity) - Consolability 0   Score: FLACC (Activity) 3   Restrictions/Precautions   Weight Bearing Precautions Per Order No   Other Precautions Multiple lines;Fall Risk;Pain   Home Living   Type of Home House  ([facility])   Home Layout One level;Stairs to enter with rails;Other (Comment)  (13STE)   Bathroom Shower/Tub Walk-in shower   Bathroom Toilet Raised   Bathroom Equipment Grab bars in shower   Home Equipment Walker;Cane    Prior Function   Level of [place] Independent with functional mobility;Independent with ADLs;Needs assistance with IADLS  (ambulates w/ SPC)   Lives With Spouse   Receives Help From Family   Falls in the last 6 months 0   Vocational Retired   General   Family/Caregiver Present Yes  (wife)   Cognition   Overall Cognitive Status WFL   Arousal/Participation Alert   Attention Within functional limits   Orientation Level Oriented X4   Following Commands Follows multistep commands without difficulty   Comments pleasant & cooperative   Subjective   Subjective Pt agreeable to PT/OT evals.   RUE Assessment   RUE Assessment   (refer to OT)   LUE Assessment   LUE Assessment   (refer to OT)   RLE Assessment   RLE Assessment WFL  (4-/5 grossly)   LLE Assessment   LLE Assessment WNL  (5/5 grossly)   Bed Mobility   Supine to Sit Unable to assess   Sit to Supine Unable to assess   Additional Comments pt OOB in chair pre & post-session   Transfers   Sit to Stand 6  Modified independent   Additional items Armrests;Increased time required   Stand to Sit 6  Modified independent   Additional items Armrests;Increased time required   Additional Comments w/ RW; cues for techniques & safety   Ambulation/Elevation   Gait pattern Antalgic;Wide KADIE;Decreased foot clearance;Decreased R stance;Excessively slow   Gait Assistance 5  Supervision   Additional items Verbal cues   Assistive Device Rolling walker   Distance 30'x1   Ambulation/Elevation Additional Comments unsteady gait but no gross LOB noted; dec amb tolerance 2* to R foot pain during amb   Balance   Static Sitting Normal   Dynamic Sitting Good   Static Standing Fair +  (w/ RW)   Dynamic Standing Fair  (w/ RW)   Ambulatory Fair -  (w/ RW)   Endurance Deficit   Endurance Deficit Yes   Endurance Deficit Description pain   Activity Tolerance   Activity Tolerance Patient limited by pain;Treatment limited secondary to medical complications (Comment)   Medical Staff Made Aware OTR  Carlita   Nurse Made Aware yes   Assessment   Prognosis Good   Problem List Decreased strength;Decreased endurance;Impaired balance;Decreased mobility;Pain   Assessment Pt. 79 y.o.male admitted for Sepsis without acute organ dysfunction (HCC) w/ severe PAD & R foot ulcer. Pt transferred from [facility] to [facility] for vascular consult. RLE vascular intervention pending. Plan for right partial 5th ray resection PENDING vascular intervention. Pt referred to PT for mobility assessment & D/C planning. Please see above for information re: home set-up & PLOF as well as objective findings during PT assessment. PTA, pt reports being I w/ SPC. On eval, pt functioning below baseline hence will continue skilled PT to improve function & safety. Pt require modified I w/ transfers & S for amb w/ RW + cues for techniques & safety. Gait deviations as above but no gross LOB noted.  Dec amb tolerance 2* to R foot pain. The patient's AM-PAC Basic Mobility Inpatient Short Form Raw Score is 21. A Raw score of greater than 16 suggests the patient may benefit from discharge to home. Please also refer to the recommendation of the Physical Therapist for safe discharge planning. From PT standpoint, will recommend Level III (minimum resource intensity) rehab services and inc family support at D/C, pending surgical procedures. No SOB & dizziness reported t/o session. Nsg staff most recent vital signs as follows: /79 (BP Location: Right arm)   Pulse 82   Temp 98 °F (36.7 °C) (Oral)   Resp 18   Wt 72.4 kg (159 lb 11.2 oz)   SpO2 96%   BMI 21.66 kg/m² . At end of session, pt remain OOB in chair in stable condition, call bell & phone in reach, all lines intact. Fall precautions reinforced w/ good understanding. CM to follow. Nsg staff to continue to mobilized pt (OOB in chair for all meals & ambulate in room/unit) as tolerated to prevent further decline in function. Will also recommend Restorative for daily amb &/or daily OOB in chair as  appropriate. Nsg notified. Co-eval was necessary to complete this PT eval for the pt's best interest given pt's medical acuity & complexity.   Goals   Patient Goals to go home   STG Expiration Date 05/26/25   Short Term Goal #1 Goals to be met in 14 days; pt will be able to: 1) inc strength & balance by 1/2 grade to improve overall functional mobility & dec fall risk; 2) inc bed mobility to I for pt to be able to get in/OOB safely w/ proper techniques 100% of the time, to dec caregiver burden & safely function at home; 3) inc transfers to I for pt to transition safely from one surface to another w/o % of the time, to dec caregiver burden & safely function at home; 4) inc amb w/ RW approx. >250' w/ modified I for pt to ambulate community distances w/o any % of the time, to dec caregiver burden & safely function at home; 5) negotiate stairs w/ modified I for inc safety during stair mgt inside/outside of home & dec caregiver burden; 6) pt/caregiver ed   PT Treatment Day 0   Plan   Treatment/Interventions Functional transfer training;LE strengthening/ROM;Elevations;Therapeutic exercise;Endurance training;Patient/family training;Bed mobility;Gait training;Spoke to nursing;OT   PT Frequency 2-3x/wk   Discharge Recommendation   Rehab Resource Intensity Level, PT III (Minimum Resource Intensity)  (pending surgical procedures)   Equipment Recommended Walker  (pt has)   Additional Comments restorative for daily amb   AM-PAC Basic Mobility Inpatient   Turning in Flat Bed Without Bedrails 4   Lying on Back to Sitting on Edge of Flat Bed Without Bedrails 4   Moving Bed to Chair 3   Standing Up From Chair Using Arms 4   Walk in Room 3   Climb 3-5 Stairs With Railing 3   Basic Mobility Inpatient Raw Score 21   Basic Mobility Standardized Score 45.55   [facility] Highest Level Of Mobility   -HLM Goal 6: Walk 10 steps or more   -HLM Achieved 7: Walk 25 feet or more   End of Consult   Patient Position at End of  Consult Bedside chair;All needs within reach   End of Consult Comments Pt in stable condition. All needs in reach. All lines intact.   Hx/personal factors: co-morbidities, inaccessible home, advanced age, mutliple lines, use of AD, pain, fall risk, and assist w/ ADL's  Examination: dec mobility, dec balance, dec endurance, dec amb, risk for falls, pain  Clinical: unpredictable (ongoing medical status, risk for falls, and pain mgt)  Complexity: high    Wander Webb

## 2025-05-12 NOTE — ASSESSMENT & PLAN NOTE
History of asthma not currently in acute exacerbation  Continue home Breztri and albuterol as needed

## 2025-05-12 NOTE — ASSESSMENT & PLAN NOTE
Primary cardiologist - Dr. Ho  LVEF of 50% on TTE 10/2024  Cardiology recommendations - patient is appropriate risk to proceed with surgery  Continue medical management   Currently not on diuretic therapy   Monitor volume status closely, cautious with IV fluids

## 2025-05-12 NOTE — ASSESSMENT & PLAN NOTE
"LEADs 4/18-   Right-50 to 75% stenosis in the distal superficial femoral artery.  Occlusion versus high-grade stenosis in the distal posterior tibial artery.  Great toe pressure is below the healing range  Left-there is occlusive versus high-grade stenosis in the posterior tibial and anterior tibial arteries. Great toe pressure is below the healing range for diabetic patient.  Compared to 7/3/2023-continue stenosis and occlusion on the right. New occlusion of the left. Significant decrease of right RIC.  CTA Abdomen with runoff (5/6)   \"Focal high-grade stenosis of distal right CFA and diffuse atherosclerotic disease of right SFA resulting in moderate to severe stenoses with focal near complete occlusions at the proximal and distal segments. Short segment occlusions of proximal right anterior tibial and peroneal arteries with reconstitution in the medial to distal segments. Right posterior tibial artery is occluded. Occluded left SFA with reconstitution in the distal segment. Left anterior tibial and posterior tibial arteries are occluded. One-vessel runoff of the left lower extremity through the peroneal artery. Focal high-grade stenosis at the proximal right renal artery.\"  Vascular surgery recommendations  Patient will require a femoral endarterectomy with possible bypass scheduled for 5/13 at [facility].  Continue statin, aspirin, heparin infusion  "

## 2025-05-12 NOTE — ASSESSMENT & PLAN NOTE
Currently in sinus rhythm  Cardiology input appreciated  Continue atenolol 25 mg daily for rate control  Hold Eliquis for now, currently on heparin infusion    Per cardiology - reinitiate Eliquis as soon after procedure once cleared by vascular team. Patient can hold aspirin for 5 days prior to procedure if deemed necessary by vascular team but should be restarted as soon after procedure once cleared by vascular team.

## 2025-05-12 NOTE — ASSESSMENT & PLAN NOTE
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   Podiatry recommendations - will likely need a partial fifth ray amputation following revascularization   Patient is requesting to return to [facility] for partial fifth ray amputation.  Dr. Watson spoke with PACS - likely will monitor for 24 - 48 hours after procedure then can return to [facility] when cleared by vascular surgery.   Continue cefazolin and Flagyl  Continue local wound care

## 2025-05-12 NOTE — ASSESSMENT & PLAN NOTE
Transferred here for vascular surgery intervention  Vascular surgery planning femoral endarterectomy/bypass tomorrow

## 2025-05-12 NOTE — PLAN OF CARE
Problem: PAIN - ADULT  Goal: Verbalizes/displays adequate comfort level or baseline comfort level  Description: Interventions:- Encourage patient to monitor pain and request assistance- Assess pain using appropriate pain scale- Administer analgesics based on type and severity of pain and evaluate response- Implement non-pharmacological measures as appropriate and evaluate response- Consider cultural and social influences on pain and pain management- Notify physician/advanced practitioner if interventions unsuccessful or patient reports new pain  Outcome: Progressing     Problem: DISCHARGE PLANNING  Goal: Discharge to home or other facility with appropriate resources  Description: INTERVENTIONS:- Identify barriers to discharge w/patient and caregiver- Arrange for needed discharge resources and transportation as appropriate- Identify discharge learning needs (meds, wound care, etc.)- Arrange for interpretive services to assist at discharge as needed- Refer to Case Management Department for coordinating discharge planning if the patient needs post-hospital services based on physician/advanced practitioner order or complex needs related to functional status, cognitive ability, or social support system  Outcome: Progressing     Problem: SKIN/TISSUE INTEGRITY - ADULT  Goal: Skin Integrity remains intact(Skin Breakdown Prevention)  Description: Assess:-Perform Sae assessment -Clean and moisturize skin -Inspect skin when repositioning, toileting, and assisting with ADLS-Assess under medical devices -Assess extremities for adequate circulation and sensation Bed Management:-Have minimal linens on bed & keep smooth, unwrinkled-Change linens as needed when moist or perspiring-Avoid sitting or lying in one position for more than 2 hours while in bed-Keep HOB at 30 degrees Toileting:-Offer bedside commode-Assess for incontinence -Use incontinent care products after each incontinent episode Activity:-Mobilize patient 3 times  a day-Encourage activity and walks on unit-Encourage or provide ROM exercises -Turn and reposition patient every 2 Hours-Use appropriate equipment to lift or move patient in bed-Instruct/ Assist with weight shifting  when out of bed in chair-Consider limitation of chair time 2 hour intervalsSkin Care:-Avoid use of baby powder, tape, friction and shearing, hot water or constrictive clothing-Relieve pressure over bony prominences Do not massage red bony areasNext Steps:-Teach patient strategies to minimize risks Consider consults to  interdisciplinary teams   Outcome: Progressing  Goal: Incision(s), wounds(s) or drain site(s) healing without S/S of infection  Description: INTERVENTIONS- Assess and document dressing, incision, wound bed, drain sites and surrounding tissue- Provide patient and family education- Perform skin care/dressing changes e  Outcome: Progressing  Goal: Pressure injury heals and does not worsen  Description: Interventions:- Implement low air loss mattress or specialty surface (Criteria met)- Apply silicone foam dressing- Instruct/assist with weight shifting when in chair - Limit chair time to 2 hour intervals- Use special pressure reducing interventions when in chair - Apply fecal or urinary incontinence containment device - Perform passive or active ROM - Turn and reposition patient & offload bony prominences every 2 hours - Utilize friction reducing device or surface for transfers - Consider consults to  interdisciplinary teams - Use incontinent care products after each incontinent episode - Consider nutrition services referral as needed  Outcome: Progressing

## 2025-05-12 NOTE — ASSESSMENT & PLAN NOTE
Patient initially presented to [facility] with increasing pain on the right foot with history of severe peripheral vascular disease and DM ulcer wound on the right foot.  He met sepsis criteria with tachycardia and leukocytosis with underlying right chronic diabetic ulcer wound with cellulitis and osteomyelitis.  Continue cefazolin and Flagyl  Blood cx - negative after 4 days  Appreciate podiatry and vascular surgery recommendations  Patient requires vascular intervention - femoral endarterectomy with possible bypass scheduled for 5/13 at [facility]  prior to podiatric surgery.   Patient was transferred to [facility] d/t need of higher level of care/vascular procedure not offered at [facility]. Dr. Aiken requested patient be transferred to [facility] and Dr. Watson accepted patient onto [facility] service.

## 2025-05-12 NOTE — ASSESSMENT & PLAN NOTE
Home regimen amlodipine 5 mg daily, atenolol 25 mg daily, and lisinopril 20 mg twice daily  Continue home regimen with hold parameters SBP less than 130 in the setting of sepsis  Monitor BPclosely and adjust medication as indicated

## 2025-05-12 NOTE — ASSESSMENT & PLAN NOTE
Lab Results   Component Value Date    HGBA1C 9.3 (H) 04/09/2025    HGBA1C 10.5 (H) 01/30/2025    HGBA1C 8.5 (H) 11/26/2024     Recent Labs     05/11/25  0745 05/11/25  1152 05/11/25  1609 05/11/25  2137   POCGLU 194* 231* 365* 245*     Blood Sugar Average: Last 72 hrs:    Hold home regimen including glipizide, linagliptin and metformin  SSI & Accu-Cheks ACHS  Blood sugars elevated in the setting of steroid use  Continue Lantus 5 U qhs  Continue humalog 5 U TID with meals   Restricted carbohydrate diet  Hypoglycemia protocol

## 2025-05-12 NOTE — CONSULTS
Podiatry - Consultation    Patient Information:   Gold Van 79 y.o. male MRN: [id number]  Unit/Bed#: E5 -01 Encounter: [id number]  PCP: Shayne Diaz MD  Date of Admission:  5/11/2025  Date of Consultation: 05/12/25  Requesting Physician: Bean Sweeney DO      ASSESSMENT:    Glod aVn is a 79 y.o. male with:    Right 5th metatarsal ulceration with underlying OM - [place] 4  R foot cellulitis  PAD  T2DM    PLAN:    Patient seen and evaluated at bedside. Right lateral 5th metatarsal wound has necrotic base with surrounding cellulitis, dusky discoloration to 5th digit. Continue abx per primary team. Local wound care consisting of betadine DSD.  Imaging of Right foot reviewed. XR from 5/7/25 with no acute changes consistent with OM. MRI from 4/17/25 with T2 imaging consistent with OM.  Plan for right partial 5th ray resection PENDING vascular intervention. Vascular surgery planning for R fem endarterectomy with antegrade agram and possible intervention vs bypass. Will follow up.   Elevation and offloading on green foam wedges or pillows when non-ambulatory.  Rest of care per primary team.  Will discuss this plan with my attending and update as needed.    Weightbearing status: Weightbearing as tolerated    SUBJECTIVE:    History of Present Illness:    Gold Van is a 79 y.o. male who is originally admitted 5/11/2025 due to sepsis without acute organ dysfunction. Patient has a past medical history of type 2 diabetes, essential hypertension, mild intermittent asthma, pruritus, CVA, severe PAD, PAF, cardiomyopathy, and atherosclerosis.    We are consulted for wound to right lateral 5th metatarsal head. Patient seen previously by Dr. Galeas at [facility]. He states that he has had wound for several weeks. He admits to poor blood flow to his feet. He states that when he saw Dr. Galeas last he was made aware that he would need his 5th toe amputated however would need vascular surgery first to improve  his blood flow. No other pedal complaints at this time.     Review of Systems:    Constitutional: Negative.    HENT: Negative.    Eyes: Negative.    Respiratory: Negative.    Cardiovascular: Negative.    Gastrointestinal: Negative.    Musculoskeletal: Right foot pain    Skin: Eschar to lateral aspect of right 5th metatarsal head   Neurological: Negative    Psych: Negative.     Past Medical and Surgical History:     Past Medical History:   Diagnosis Date    Asthma     COVID-19     CVA (cerebral vascular accident) (HCC)     Diabetes mellitus (HCC)     Environmental allergies     Hyperlipidemia     Hypertension     Macular degeneration 07/13/2020    right eye    Orthostatic hypotension     Osteopenia     Pneumonia     Pneumothorax     Sinusitis        Past Surgical History:   Procedure Laterality Date    CARPAL TUNNEL RELEASE Left 08/2024    CATARACT EXTRACTION Left 07/2024    COLONOSCOPY      KNEE CARTILAGE SURGERY Right 1964    VASECTOMY      WISDOM TOOTH EXTRACTION      x4       Meds/Allergies:      Medications Prior to Admission:     albuterol (ACCUNEB) 1.25 MG/3ML nebulizer solution    albuterol (PROVENTIL HFA,VENTOLIN HFA) 90 mcg/act inhaler    amLODIPine (NORVASC) 5 mg tablet    apixaban (ELIQUIS) 5 mg    aspirin 81 mg chewable tablet    atenolol (TENORMIN) 25 mg tablet    atorvastatin (LIPITOR) 40 mg tablet    Budeson-Glycopyrrol-Formoterol (Breztri Aerosphere) 160-9-4.8 MCG/ACT AERO    collagenase (SANTYL) ointment    famotidine (PEPCID) 20 mg tablet    glipiZIDE (GLUCOTROL XL) 10 mg 24 hr tablet    guaiFENesin (MUCINEX) 600 mg 12 hr tablet    linaGLIPtin (Tradjenta) 5 MG TABS    lisinopril (ZESTRIL) 20 mg tablet    LORazepam (ATIVAN) 0.5 mg tablet    metFORMIN (GLUCOPHAGE-XR) 500 mg 24 hr tablet    montelukast (SINGULAIR) 10 mg tablet    Multiple Vitamins-Minerals (Multivitamin Adults) TABS    OneTouch Ultra test strip    predniSONE 5 mg tablet    Allergies   Allergen Reactions    Ciprofloxacin Shortness Of  Breath    Sulfamethoxazole Shortness Of Breath    Trimethoprim Shortness Of Breath    Dexamethasone Other (See Comments)    Triamcinolone Other (See Comments)    Bactrim [Sulfamethoxazole-Trimethoprim] Fever     Fever of 107       Social History:     Marital Status: /Civil Union    Substance Use History:   Social History     Substance and Sexual Activity   Alcohol Use Never     Social History     Tobacco Use   Smoking Status Former   Smokeless Tobacco Never   Tobacco Comments    quit about 25 years ago     Social History     Substance and Sexual Activity   Drug Use Never       Family History:    Family History   Problem Relation Age of Onset    Asthma Mother     Emphysema Mother     Cancer Father     Asthma Brother     Cancer Brother          OBJECTIVE:    Vitals:   Blood Pressure: 139/79 (05/12/25 0711)  Pulse: 82 (05/12/25 0711)  Temperature: 98 °F (36.7 °C) (05/12/25 0711)  Temp Source: Oral (05/12/25 0711)  Respirations: 18 (05/12/25 0711)  Weight - Scale: 72.4 kg (159 lb 11.2 oz) (05/12/25 0553)  SpO2: 96 % (05/12/25 0711)    Physical Exam:    General Appearance: Alert, cooperative, no distress.  HEENT: Head normocephalic, atraumatic, without obvious abnormality.  Heart: Normal rate and rhythm.  Lungs: Non-labored breathing. No respiratory distress.  Abdomen: Without distension.  Psychiatric: AAOx3  Lower Extremity:    Vascular:   DP: Right: non-palpable Left: non-palpable  PT: Right: non-palpable Left: non-palpable  CRT < 3 seconds at the digits. +3/4 edema noted at bilateral lower extremities.   Pedal hair is absent.   Skin temperature is cool bilaterally.    Musculoskeletal:  MMT is 4/5 in all muscle compartments bilaterally.   ROM at the 1st MPJ and ankle joint are decreased bilaterally with the leg extended.   No Pain on palpation of bilateral lower extremities.   No gross deformities noted.     Dermatological:  Lower extremity wound(s) as noted below:    Wound #: 1  Location: Lateral aspect of  Right 5th metatarsal head  Length 3.0cm: Width 2.5cm: Depth unable to determine  Deepest Tissue Noted in Base: Eschar  Probe to Bone: No  Peripheral Skin Description: Attached  Granulation: 0% Fibrotic Tissue: 0% Necrotic Tissue: 100%   Drainage Amount:  none  Signs of Infection: Yes, localized erythema and edema.     Neurological:  Gross sensation is intact.   Light touch is diminished.   Protective sensation is diminished.    Clinical Images 05/12/25:            Additional data:     Lab Results: I have personally reviewed pertinent labs including:    Results from last 7 days   Lab Units 05/11/25  0549 05/08/25 0425 05/07/25  0900   WBC Thousand/uL 8.24   < > 12.54*   HEMOGLOBIN g/dL 10.2*   < > 11.5*   HEMATOCRIT % 31.7*   < > 36.0*   PLATELETS Thousands/uL 254   < > 310   SEGS PCT %  --   --  75   LYMPHO PCT %  --   --  14   MONO PCT %  --   --  8   EOS PCT %  --   --  2    < > = values in this interval not displayed.     Results from last 7 days   Lab Units 05/11/25  0549 05/08/25 0425 05/07/25  0900   POTASSIUM mmol/L 4.2   < > 3.9   CHLORIDE mmol/L 101   < > 102   CO2 mmol/L 25   < > 25   BUN mg/dL 15   < > 17   CREATININE mg/dL 0.94   < > 0.95   CALCIUM mg/dL 8.7   < > 9.4   ALK PHOS U/L  --   --  73   ALT U/L  --   --  10   AST U/L  --   --  9*    < > = values in this interval not displayed.     Results from last 7 days   Lab Units 05/07/25  0900   INR  1.14       Cultures: I have personally reviewed pertinent cultures including:    Results from last 7 days   Lab Units 05/07/25  0900   BLOOD CULTURE  No Growth After 4 Days.  No Growth After 4 Days.           Imaging: I have personally reviewed pertinent reports in PACS.  EKG, Pathology, and Other Studies: I have personally reviewed pertinent reports.    Time Spent for Care: 30 minutes.  More than 50% of total time spent on counseling and coordination of care as described above.      ** Please Note: Portions of the record may have been created with voice  "recognition software. Occasional wrong word or \"sound a like\" substitutions may have occurred due to the inherent limitations of voice recognition software. Read the chart carefully and recognize, using context, where substitutions have occurred. **    "

## 2025-05-12 NOTE — ASSESSMENT & PLAN NOTE
History of diabetes mellitus paroxysmal atrial fibrillation asthma cardiomyopathy and PAD presented to [facility] with right foot wound  Met sepsis criteria at [facility]  Currently on cefazolin/metronidazole  Blood cultures no growth  Transferred here for vascular surgery.  Podiatry planning on right partial fifth ray resection

## 2025-05-12 NOTE — H&P
"H&P - Hospitalist   Name: Gold Van 79 y.o. male I MRN: [id number]  Unit/Bed#: E5 -01 I Date of Admission: 5/11/2025   Date of Service: 5/12/2025 I Hospital Day: 1     Assessment & Plan  Sepsis without acute organ dysfunction (HCC)  Patient initially presented to [facility] with increasing pain on the right foot with history of severe peripheral vascular disease and DM ulcer wound on the right foot.  He met sepsis criteria with tachycardia and leukocytosis with underlying right chronic diabetic ulcer wound with cellulitis and osteomyelitis.  Continue cefazolin and Flagyl  Blood cx - negative after 4 days  Appreciate podiatry and vascular surgery recommendations  Patient requires vascular intervention - femoral endarterectomy with possible bypass scheduled for 5/13 at [facility]  prior to podiatric surgery.   Patient was transferred to [facility] d/t need of higher level of care/vascular procedure not offered at [facility]. Dr. Aiken requested patient be transferred to [facility] and Dr. Watson accepted patient onto [facility] service.  Severe peripheral arterial disease (HCC)  LEADs 4/18  Right-50 to 75% stenosis in the distal superficial femoral artery.  Occlusion versus high-grade stenosis in the distal posterior tibial artery. Great toe pressure is below the healing range  Left-occlusive versus high-grade stenosis in the posterior tibial and anterior tibial arteries. Great toe pressure is below the healing range for diabetic patient.  Compared to 7/3/2023-continue stenosis and occlusion on the right. New occlusion of the left. Significant decrease of right RIC.  CTA Abdomen with runoff (5/6)   \"Focal high-grade stenosis of distal right CFA and diffuse atherosclerotic disease of right SFA resulting in moderate to severe stenoses with focal near complete occlusions at the proximal and distal segments. Short segment occlusions of proximal right anterior tibial and peroneal arteries " "with reconstitution in the medial to distal segments. Right posterior tibial artery is occluded. Occluded left SFA with reconstitution in the distal segment. Left anterior tibial and posterior tibial arteries are occluded. One-vessel runoff of the left lower extremity through the peroneal artery. Focal high-grade stenosis at the proximal right renal artery.\"  Vascular surgery recommendations  Patient will require a femoral endarterectomy with possible bypass scheduled for 5/13 at [facility].  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   Podiatry recommendations - will likely need a partial fifth ray amputation following revascularization   Patient is requesting to return to [facility] for partial fifth ray amputation.  Dr. Watson spoke with PACS - likely will monitor for 24 - 48 hours after procedure then can return to [facility] when cleared by vascular surgery.   Continue cefazolin and Flagyl  Continue local wound care  Continue pain management  Type 2 diabetes mellitus with complication, without long-term current use of insulin ([facility])  Lab Results   Component Value Date    HGBA1C 9.3 (H) 04/09/2025    HGBA1C 10.5 (H) 01/30/2025    HGBA1C 8.5 (H) 11/26/2024     Recent Labs     05/11/25  0745 05/11/25  1152 05/11/25  1609 05/11/25  2137   POCGLU 194* 231* 365* 245*     Blood Sugar Average: Last 72 hrs:    Hold home regimen including glipizide, linagliptin and metformin  SSI & Accu-Cheks ACHS  Blood sugars elevated in the setting of steroid use  Continue Lantus 5 U qhs  Continue humalog 5 U TID with meals   Restricted carbohydrate " diet  Hypoglycemia protocol    PAF (paroxysmal atrial fibrillation) ([facility])  Currently in sinus rhythm  Cardiology input appreciated  Continue atenolol 25 mg daily for rate control  Hold Eliquis for now, currently on heparin infusion    Per cardiology - reinitiate Eliquis after procedure once cleared by vascular team. Patient can hold aspirin for 5 days prior to procedure if deemed necessary by vascular team but should be restarted after procedure once cleared by vascular team  Essential hypertension  Home regimen amlodipine 5 mg daily, atenolol 25 mg daily, and lisinopril 20 mg twice daily  Continue home regimen with hold parameters SBP less than 130 in the setting of sepsis  Monitor BP  Mild intermittent asthma without complication  History of asthma not currently in acute exacerbation  Continue home Breztri and albuterol as needed  Pruritus  Chronic pruritus maintained on prednisone 5 mg daily  Continue prednisone  CVA (cerebrovascular accident) ([facility])  CVA history in 9/2024  Continue stroke prevention with statin, holding aspirin for vascular procedure on 5/13/25  Holding Eliquis for now and continuing heparin infusion while waiting for vascular procedure planned for 5/13/25 at [facility]  Cardiomyopathy ([facility])  Primary cardiologist - Dr. Ho  LVEF of 50% on TTE 10/2024  Cardiology recommendations - patient is appropriate risk to proceed with surgery  Currently not on diuretic therapy - monitor volume status closely    VTE Pharmacologic Prophylaxis: VTE Score: 5 High Risk (Score >/= 5) - Pharmacological DVT Prophylaxis Ordered: heparin drip. Sequential Compression Devices Ordered.  Code Status: Level 1 - Full Code Per patient  Discussion with family: Patient declined call to .     Anticipated Length of Stay: Patient will be admitted on an inpatient basis with an anticipated length of stay of greater than 2 midnights secondary to severe PAD & diabetic foot ulcer with cellulitis and  osteomyelitis.    History of Present Illness   Chief Complaint: Diabetic foot ulcer with cellulitis and osteomyelitis    Glod Van is a 79 y.o. male with a PMH of asthma, CVA, diabetes type 2, hyperlipidemia, hypertension, macular degeneration, osteopenia, cardiomyopathy, pruritus who presents with diabetic foot ulcer with cellulitis and osteomyelitis.  Patient was initially seen at the [facility] for sepsis, severe PAD, and diabetic foot ulcer with cellulitis and osteomyelitis.  Patient blood cultures continues to be negative.  Patient still receiving Ancef and Flagyl ABX. Patient was initially seen by vascular surgery who recommended a femoral endarterectomy with possible bypass.  Patient was transferred from [facility] to [facility] because vascular procedure not currently offered at [facility]. Dr. Aiken requested patient be transferred and Dr. Watson accepted patient onto [facility] service.  Patient was also seen by podiatry who recommended a partial fifth ray amputation following revascularization.    Review of Systems   Constitutional:  Negative for chills and fever.   HENT:  Negative for ear pain and sore throat.    Eyes:  Negative for pain and visual disturbance.   Respiratory:  Negative for cough and shortness of breath.    Cardiovascular:  Positive for leg swelling. Negative for chest pain and palpitations.        RLE swelling   Gastrointestinal:  Negative for abdominal pain, nausea and vomiting.   Endocrine: Negative.    Genitourinary:  Negative for dysuria and hematuria.   Musculoskeletal:  Positive for myalgias. Negative for arthralgias and back pain.        Patient reporting 5-6/10 pain in right foot.  Ganglion cyst present on right wrist/forearm   Skin:  Positive for color change and wound. Negative for rash.        Erythema present in RLE.   Allergic/Immunologic: Negative.    Neurological:  Positive for numbness. Negative for dizziness, seizures, syncope, light-headedness and headaches.         Patient reports intermittent numbness in right foot.   Hematological:  Bruises/bleeds easily.   Psychiatric/Behavioral: Negative.     All other systems reviewed and are negative.    Historical Information   Past Medical History:   Diagnosis Date    Asthma     COVID-19     CVA (cerebral vascular accident) (HCC)     Diabetes mellitus (HCC)     Environmental allergies     Hyperlipidemia     Hypertension     Macular degeneration 07/13/2020    right eye    Orthostatic hypotension     Osteopenia     Pneumonia     Pneumothorax     Sinusitis      Past Surgical History:   Procedure Laterality Date    CARPAL TUNNEL RELEASE Left 08/2024    CATARACT EXTRACTION Left 07/2024    COLONOSCOPY      KNEE CARTILAGE SURGERY Right 1964    VASECTOMY      WISDOM TOOTH EXTRACTION      x4     Social History     Tobacco Use    Smoking status: Former    Smokeless tobacco: Never    Tobacco comments:     quit about 25 years ago   Vaping Use    Vaping status: Never Used   Substance and Sexual Activity    Alcohol use: Never    Drug use: Never    Sexual activity: Yes     Partners: Female     E-Cigarette/Vaping    E-Cigarette Use Never User      E-Cigarette/Vaping Substances    Nicotine No     THC No     CBD No     Flavoring No     Other No     Unknown No      Family History   Problem Relation Age of Onset    Asthma Mother     Emphysema Mother     Cancer Father     Asthma Brother     Cancer Brother      Social History:  Marital Status: /Civil Union   Occupation: Retired  Patient Pre-hospital Living Situation: Home, With spouse  Patient Pre-hospital Level of Mobility: walks with cane  Patient Pre-hospital Diet Restrictions: Carb controlled diet    Meds/Allergies   I have reviewed home medications using recent Epic encounter.  Prior to Admission medications    Medication Sig Start Date End Date Taking? Authorizing Provider   albuterol (ACCUNEB) 1.25 MG/3ML nebulizer solution Take 1.25 mg by nebulization every 6 (six) hours as needed for  wheezing    Historical Provider, MD   albuterol (PROVENTIL HFA,VENTOLIN HFA) 90 mcg/act inhaler 1 puff if needed    Historical Provider, MD   amLODIPine (NORVASC) 5 mg tablet Take 5 mg by mouth 2 (two) times a day    Historical Provider, MD   apixaban (ELIQUIS) 5 mg 5 mg 2 (two) times a day 2/10/25   Historical Provider, MD   aspirin 81 mg chewable tablet Chew 1 tablet (81 mg total) daily 10/5/24   BELGICA Nelson   atenolol (TENORMIN) 25 mg tablet Take 25 mg by mouth daily    Historical Provider, MD   atorvastatin (LIPITOR) 40 mg tablet Take 1 tablet (40 mg total) by mouth every evening 10/16/24   Annie Martines PA-C   Budeson-Glycopyrrol-Formoterol (Breztri Aerosphere) 160-9-4.8 MCG/ACT AERO Take 2 puffs by mouth Every 12 hours    Historical Provider, MD   collagenase (SANTYL) ointment Apply topically daily To wounds of R lateral foot and heel  Patient not taking: Reported on 5/7/2025 4/8/25 5/8/25  Era Cardenas PA-C   famotidine (PEPCID) 20 mg tablet Take 1 tablet (20 mg total) by mouth 2 (two) times a day 10/4/24   EBLGICA Nelson   glipiZIDE (GLUCOTROL XL) 10 mg 24 hr tablet Take 10 mg by mouth 2 (two) times a day    Historical Provider, MD   guaiFENesin (MUCINEX) 600 mg 12 hr tablet Take 1 tablet (600 mg total) by mouth 2 (two) times a day  Patient not taking: Reported on 5/7/2025 10/16/24   Annie Martines PA-C   linaGLIPtin (Tradjenta) 5 MG TABS Take 5 mg by mouth daily with dinner    Historical Provider, MD   lisinopril (ZESTRIL) 20 mg tablet Take 20 mg by mouth 2 (two) times a day    Historical Provider, MD   LORazepam (ATIVAN) 0.5 mg tablet Take 0.5 mg by mouth if needed    Historical Provider, MD   metFORMIN (GLUCOPHAGE-XR) 500 mg 24 hr tablet Take 500 mg by mouth daily with dinner    Historical Provider, MD   montelukast (SINGULAIR) 10 mg tablet Take 1 tablet (10 mg total) by mouth daily at bedtime 10/16/24   Annie Martines PA-C   Multiple Vitamins-Minerals (Multivitamin  Adults) TABS Take 1 tablet by mouth daily    Historical Provider, MD   OneTouch Ultra test strip  3/26/25   Historical Provider, MD   predniSONE 5 mg tablet Take 1 tablet (5 mg total) by mouth every other day  Patient taking differently: Take 5 mg by mouth daily 10/17/24   Annie Martines PA-C     Allergies   Allergen Reactions    Ciprofloxacin Shortness Of Breath    Sulfamethoxazole Shortness Of Breath    Trimethoprim Shortness Of Breath    Dexamethasone Other (See Comments)    Triamcinolone Other (See Comments)    Bactrim [Sulfamethoxazole-Trimethoprim] Fever     Fever of 107     Objective :  Temp:  [97.8 °F (36.6 °C)-98.2 °F (36.8 °C)] 97.9 °F (36.6 °C)  HR:  [68-91] 91  BP: (135-151)/(75-80) 151/78  Resp:  [16] 16  SpO2:  [92 %-96 %] 96 %  O2 Device: None (Room air)    Physical Exam  Constitutional:       General: He is not in acute distress.     Appearance: He is not ill-appearing.   Cardiovascular:      Rate and Rhythm: Normal rate and regular rhythm.      Pulses: Normal pulses.      Heart sounds: Normal heart sounds. No murmur heard.  Pulmonary:      Effort: Pulmonary effort is normal. No respiratory distress.      Breath sounds: Normal breath sounds. No wheezing or rales.   Abdominal:      General: Bowel sounds are normal. There is no distension.      Palpations: Abdomen is soft.      Tenderness: There is no abdominal tenderness.   Musculoskeletal:         General: No swelling.      Right lower leg: Tenderness present. 1+ Pitting Edema present.      Right foot: Swelling and tenderness present.   Feet:      Right foot:      Skin integrity: Ulcer, skin breakdown and erythema present.   Skin:     General: Skin is warm and dry.      Findings: Erythema present. No rash.   Neurological:      General: No focal deficit present.      Mental Status: He is alert and oriented to person, place, and time. Mental status is at baseline.   Psychiatric:         Mood and Affect: Mood normal.         Behavior: Behavior  normal.         Thought Content: Thought content normal.         Judgment: Judgment normal.        Lines/Drains:      Lab Results: I have reviewed the following results:  Results from last 7 days   Lab Units 05/11/25  0549 05/08/25 0425 05/07/25  0900   WBC Thousand/uL 8.24   < > 12.54*   HEMOGLOBIN g/dL 10.2*   < > 11.5*   HEMATOCRIT % 31.7*   < > 36.0*   PLATELETS Thousands/uL 254   < > 310   SEGS PCT %  --   --  75   LYMPHO PCT %  --   --  14   MONO PCT %  --   --  8   EOS PCT %  --   --  2    < > = values in this interval not displayed.     Results from last 7 days   Lab Units 05/11/25  0549 05/08/25 0425 05/07/25  0900   SODIUM mmol/L 133*   < > 137   POTASSIUM mmol/L 4.2   < > 3.9   CHLORIDE mmol/L 101   < > 102   CO2 mmol/L 25   < > 25   BUN mg/dL 15   < > 17   CREATININE mg/dL 0.94   < > 0.95   ANION GAP mmol/L 7   < > 10   CALCIUM mg/dL 8.7   < > 9.4   ALBUMIN g/dL  --   --  3.7   TOTAL BILIRUBIN mg/dL  --   --  0.31   ALK PHOS U/L  --   --  73   ALT U/L  --   --  10   AST U/L  --   --  9*   GLUCOSE RANDOM mg/dL 212*   < > 155*    < > = values in this interval not displayed.     Results from last 7 days   Lab Units 05/07/25  0900   INR  1.14     Results from last 7 days   Lab Units 05/11/25  2137 05/11/25  1609 05/11/25  1152 05/11/25  0745 05/10/25  2039 05/10/25  1704 05/10/25  1058 05/10/25  0717 05/09/25  2101 05/09/25  1614 05/09/25  1137 05/09/25  0733   POC GLUCOSE mg/dl 245* 365* 231* 194* 212* 423* 254* 180* 271* 411* 219* 165*     Lab Results   Component Value Date    HGBA1C 9.3 (H) 04/09/2025    HGBA1C 10.5 (H) 01/30/2025    HGBA1C 8.5 (H) 11/26/2024     Results from last 7 days   Lab Units 05/08/25  0425 05/07/25  1242 05/07/25  0900   LACTIC ACID mmol/L  --  1.5 2.4*   PROCALCITONIN ng/ml 0.11  --  0.08     Imaging Results Review: I reviewed radiology reports from this admission including: CT abdominal with run off and xray(s).  Other Study Results Review: EKG was reviewed.     Administrative  Statements     ** Please Note: This note has been constructed using a voice recognition system. **

## 2025-05-12 NOTE — ASSESSMENT & PLAN NOTE
Pt is a 78 yo male ex-smoker with PMH of T2DM, HTN, HLD, Asthma, CVA, PAF, and PAD who presented to [facility] ED 5/7 with R foot pain x1w and non-healing wound. Pt was transferred to [facility] for vascular intervention on 5/11.     Vascular surgery consulted for intervention of RLE for a non-healing foot wound in the setting of PAD.     Diagnostics:  -Vein mapping 5/7: R GSV is patent from groin to ankle, 2.2mm to 12.6mm. L GSV is patent from groin to ankle, 2.1mm to 9mm.   -Venous duplex 5/7: No DVT, superficial thrombosis, and monophasic waveforms in Pop, PT, and AT in RLE.   -CTA 5/6: R: EDY/EIA/IIA patent with atherosclerosis, Focal high-grade stenosis distal CFA, diffuse calcifications in SFA with near occlusions in proximal and distal segments. L: EDY/EIA patent with atherosclerotic calcifications, high grade stenosis at origin of IIA, SFA occluded with reconstitution in distal segment, Pop diffuse atherosclerosis, high grade stenosis TPT.    Plan:  -Plan for R fem endarterectomy and antegrade endovascular intervention with possible bypass scheduled for tomorrow 5/13  -Please keep NPO at [place] tonight  -Cont Lipitor  -Start ASA 81mg  -Cont to hold eliquis for OR tomorrow, Hold heparin 4-6 hours prior to OR  -Podiatry consulted, input appreciated  -Cardiology consulted at [facility] for surgical risk stratification: patient is appropriate risk to proceed with surgery   -Remainder of care per primary, input appreciated  -Will discuss with Dr. Galicia

## 2025-05-12 NOTE — UTILIZATION REVIEW
Initial Clinical Review    TRANSFER FROM  [facility]    Admission: Date/Time/Statement:   Admission Orders (From admission, onward)       Ordered        05/11/25 2252  INPATIENT ADMISSION  Once                          Orders Placed This Encounter   Procedures    INPATIENT ADMISSION     Standing Status:   Standing     Number of Occurrences:   1     Level of Care:   Med Surg [16]     Estimated length of stay:   More than 2 Midnights     Certification:   I certify that inpatient services are medically necessary for this patient for a duration of greater than two midnights. See H&P and MD Progress Notes for additional information about the patient's course of treatment.         Initial Presentation: 79 y.o. male  [place] [facility]  [place]   [place]  with sepsis, severe  PAD,  diabetic foot ulcer with  cellulitis and osteo.  Transferred to [facility] for vascular intervention.  PMH  is  CVA, Paf,   DM,  HTN, CM.  Admit  Ip with  Sepsis,  Severe  PAD,  Ulcer  R  foot and plan is  TRISTAN, local wound  care, vascular consult, IV  heparin and plan  surgery  5/13.     Anticipated Length of Stay/Certification Statement: Patient will be admitted on an inpatient basis with an anticipated length of stay of greater than 2 midnights secondary to severe PAD & diabetic foot ulcer with cellulitis and osteomyelitis.       Date:   5/12   Day 2:   Vascular consult  Plan   vascular surgery   5/13.   Hold  heparin   4-6  hours prior to OR.       Podiatry consult  Remain  on  [place]  and IV  heparin.  Can be   WBAT.  Plan  right parthial 5th ray resection  pending vascular intervention.          Scheduled Medications:  acetaminophen, 975 mg, Oral, Q8H SANDRA  amLODIPine, 5 mg, Oral, BID  aspirin, 81 mg, Oral, Daily  atenolol, 25 mg, Oral, Daily  atorvastatin, 40 mg, Oral, QPM  Budeson-Glycopyrrol-Formoterol, 2 puff, Inhalation, BID  cefazolin, 2,000 mg, Intravenous, Q8H  famotidine, 20 mg, Oral, BID  insulin glargine, 5 Units,  Subcutaneous, HS  insulin lispro, 1-5 Units, Subcutaneous, TID AC  insulin lispro, 1-5 Units, Subcutaneous, HS  insulin lispro, 5 Units, Subcutaneous, TID With Meals  lisinopril, 20 mg, Oral, BID  metroNIDAZOLE, 500 mg, Intravenous, Q12H  montelukast, 10 mg, Oral, HS  multivitamin-minerals, 1 tablet, Oral, Daily  predniSONE, 5 mg, Oral, Daily      Continuous IV Infusions:  heparin (porcine), 3-30 Units/kg/hr (Order-Specific), Intravenous, Titrated      PRN Meds:  albuterol, 1 puff, Inhalation, Q4H PRN  heparin (porcine), 2,800 Units, Intravenous, Q6H PRN  heparin (porcine), 5,600 Units, Intravenous, Q6H PRN  LORazepam, 0.5 mg, Oral, Q8H PRN  morphine injection, 2 mg, Intravenous, Q4H PRN  oxyCODONE, 5 mg, Oral, Q4H PRN   Or  oxyCODONE, 10 mg, Oral, Q4H PRN      ED Triage Vitals   Temperature Pulse Respirations Blood Pressure SpO2 Pain Score   05/11/25 2244 05/11/25 2244 05/12/25 0711 05/11/25 2244 05/11/25 2244 05/11/25 2333   97.9 °F (36.6 °C) 91 18 151/78 96 % 5     Weight (last 2 days)       Date/Time Weight    05/12/25 0553 72.4 (159.7)            Vital Signs (last 3 days)       Date/Time Temp Pulse Resp BP MAP (mmHg) SpO2 O2 Device Patient Position - Orthostatic VS Pain    05/12/25 1155 -- -- -- -- -- -- -- -- 6    05/12/25 07:11:07 98 °F (36.7 °C) 82 18 139/79 99 96 % None (Room air) Lying --    05/12/25 0708 -- -- -- -- -- -- -- -- No Pain    05/11/25 2333 -- -- -- -- -- -- -- -- 5    05/11/25 22:44:25 97.9 °F (36.6 °C) 91 -- 151/78 102 96 % -- -- --              Pertinent Labs/Diagnostic Test Results:   Radiology:    Results from last 7 days   Lab Units 05/11/25  0549 05/10/25  0608 05/09/25  0543 05/08/25  0425 05/07/25  0900   WBC Thousand/uL 8.24 8.55 9.00 10.11 12.54*   HEMOGLOBIN g/dL 10.2* 9.7* 10.4* 10.0* 11.5*   HEMATOCRIT % 31.7* 30.2* 32.1* 31.2* 36.0*   PLATELETS Thousands/uL 254 245 257 248 310   TOTAL NEUT ABS Thousands/µL  --   --   --   --  9.43*         Results from last 7 days   Lab Units  05/11/25  0549 05/10/25  0608 05/09/25  0543 05/08/25  0425 05/07/25  0900   SODIUM mmol/L 133* 133* 135 134* 137   POTASSIUM mmol/L 4.2 4.0 4.0 4.1 3.9   CHLORIDE mmol/L 101 101 104 104 102   CO2 mmol/L 25 26 24 24 25   ANION GAP mmol/L 7 6 7 6 10   BUN mg/dL 15 16 15 15 17   CREATININE mg/dL 0.94 0.93 0.91 0.93 0.95   EGFR ml/min/1.73sq m 76 77 79 77 75   CALCIUM mg/dL 8.7 8.6 8.9 8.5 9.4     Results from last 7 days   Lab Units 05/07/25  0900   AST U/L 9*   ALT U/L 10   ALK PHOS U/L 73   TOTAL PROTEIN g/dL 7.1   ALBUMIN g/dL 3.7   TOTAL BILIRUBIN mg/dL 0.31     Results from last 7 days   Lab Units 05/12/25  1113 05/12/25  0710 05/11/25  2137 05/11/25  1609 05/11/25  1152 05/11/25  0745 05/10/25  2039 05/10/25  1704 05/10/25  1058 05/10/25  0717 05/09/25  2101 05/09/25  1614   POC GLUCOSE mg/dl 207* 162* 245* 365* 231* 194* 212* 423* 254* 180* 271* 411*     Results from last 7 days   Lab Units 05/11/25  0549 05/10/25  0608 05/09/25  0543 05/08/25  0425 05/07/25  0900   GLUCOSE RANDOM mg/dL 212* 199* 175* 227* 155*           Results from last 7 days   Lab Units 05/12/25  0603 05/11/25  2327 05/11/25  0549 05/07/25  1819 05/07/25  0900   PROTIME seconds  --   --   --   --  15.1*   INR   --   --   --   --  1.14   PTT seconds 66* 47* 67*   < > 31    < > = values in this interval not displayed.         Results from last 7 days   Lab Units 05/08/25  0425 05/07/25  0900   PROCALCITONIN ng/ml 0.11 0.08     Results from last 7 days   Lab Units 05/07/25  1242 05/07/25  0900   LACTIC ACID mmol/L 1.5 2.4*           Results from last 7 days   Lab Units 05/07/25  1144   CLARITY UA  Clear   COLOR UA  Yellow   SPEC GRAV UA  1.010   PH UA  6.0   GLUCOSE UA mg/dl Negative   KETONES UA mg/dl Negative   BLOOD UA  1+*   PROTEIN UA mg/dl Trace*   NITRITE UA  Negative   BILIRUBIN UA  Negative   UROBILINOGEN UA E.U./dl 0.2   LEUKOCYTES UA  Negative   WBC UA /hpf 0-1   RBC UA /hpf 1-2   BACTERIA UA /hpf None Seen   EPITHELIAL CELLS WET  PREP /hpf Occasional                                 Results from last 7 days   Lab Units 05/07/25  0900   BLOOD CULTURE  No Growth After 4 Days.  No Growth After 4 Days.               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 male    Network Utilization Review Department  ATTENTION: Please call with any questions or concerns to [phone] and carefully listen to the prompts so that you are directed to the right person. All voicemails are confidential.   For Discharge needs, contact Care Management DC Support Team at [phone] opt. [phone]  Send all requests for admission clinical reviews, approved or denied determinations and any other requests to dedicated fax number below belonging to the campus where the patient is receiving treatment. List of dedicated fax numbers for the Facilities:  FACILITY NAME UR FAX NUMBER   ADMISSION DENIALS (Administrative/Medical Necessity) [phone]   DISCHARGE SUPPORT TEAM (NETWORK) [phone]   PARENT CHILD HEALTH (Maternity/NICU/Pediatrics) [phone]   [facility] [phone]   [facility] [phone]   [facility] [phone]   [facility] [phone]   [facility] [phone]   [facility] [phone]   [facility]  [place] [phone]   [facility] [phone]   [facility] [phone]   [facility] [phone]   General acute hospital [phone]   [facility] [phone]

## 2025-05-12 NOTE — ASSESSMENT & PLAN NOTE
History of asthma not currently in acute exacerbation  Continue with Breztri and albuterol as needed

## 2025-05-12 NOTE — ASSESSMENT & PLAN NOTE
History of CVA 9/2024  Continue stroke prevention with statin, aspirin  Hold Eliquis for now while waiting for vascular procedure planned for 5/13/25 at [facility]  Continue heparin infusion

## 2025-05-12 NOTE — ASSESSMENT & PLAN NOTE
Currently in sinus rhythm  Cardiology input appreciated  Continue atenolol 25 mg daily for rate control  Hold Eliquis for now, currently on heparin infusion    Per cardiology - reinitiate Eliquis after procedure once cleared by vascular team. Patient can hold aspirin for 5 days prior to procedure if deemed necessary by vascular team but should be restarted after procedure once cleared by vascular team

## 2025-05-12 NOTE — ASSESSMENT & PLAN NOTE
Lab Results   Component Value Date    HGBA1C 9.3 (H) 04/09/2025     Recent Labs     05/11/25  2137 05/12/25  0710 05/12/25  1113 05/12/25  1557   POCGLU 245* 162* 207* 329*     Prior to admission glipizide linagliptin and metformin  Started on glargine 5 units with lispro 5 units 3 times daily during hospitalization

## 2025-05-12 NOTE — ASSESSMENT & PLAN NOTE
CVA history in 9/2024  Continue stroke prevention with statin, holding aspirin for vascular procedure on 5/13/25  Holding Eliquis for now and continuing heparin infusion while waiting for vascular procedure planned for 5/13/25 at [facility]

## 2025-05-12 NOTE — ASSESSMENT & PLAN NOTE
Patient initially presented to [facility] with increasing pain on the right foot with history of severe peripheral vascular disease and DM ulcer wound on the right foot.  He met sepsis criteria with tachycardia and leukocytosis with underlying right chronic diabetic ulcer wound with cellulitis and osteomyelitis.  Continue cefazolin and Flagyl  Blood cx - negative after 4 days  Podiatry and vascular surgery input appreciated  Patient will require vascular intervention - femoral endarterectomy with possible bypass scheduled for 5/13 at [facility]  prior to podiatric surgery.   Patient was transferred to [facility] d/t need of higher level of care/vascular procedure not offered at [facility]. Dr. Aiken requested the patient be transferred to [facility]. Patient's case was discussed with Dr. Watson who accepted the patient onto [facility] service.

## 2025-05-12 NOTE — ASSESSMENT & PLAN NOTE
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   Podiatry recommendations - will likely need a partial fifth ray amputation following revascularization   Patient is requesting to return to [facility] for partial fifth ray amputation.  Dr. Watson spoke with PACS - likely will monitor for 24 - 48 hours after procedure then can return to [facility] when cleared by vascular surgery.   Continue cefazolin and Flagyl  Continue local wound care  Continue pain management

## 2025-05-12 NOTE — ANESTHESIA PREPROCEDURE EVALUATION
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 ([facility])   (+) Essential hypertension   (+) Mixed hyperlipidemia   (+) PAF (paroxysmal atrial fibrillation) ([facility])   (+) Severe peripheral arterial disease ([facility])      ENDO   (+) Type 2 diabetes mellitus with complication, without long-term current use of insulin ([facility])      GI/HEPATIC   (+) Gastroesophageal reflux disease without esophagitis      NEURO/PSYCH   (+) CVA (cerebrovascular accident) (HCC)      PULMONARY   (+) Acute respiratory failure with hypoxia ([facility])   (+) 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         Additional Monitors: arterial line.    Airway Plan:            Plan Factors-Exercise tolerance (METS): >4 METS.    Chart reviewed.   Existing labs reviewed. Patient summary reviewed.    Patient is not a current smoker.              Induction- intravenous.    Postoperative Plan-     Perioperative Resuscitation Plan - Level 1 - Full Code.       Informed Consent- Anesthetic plan and risks discussed with patient.        NPO Status:  No vitals data found for the desired time range.

## 2025-05-12 NOTE — CONSULTS
Consultation - Vascular Surgery   Name: Gold Van 79 y.o. male I MRN: [id number]  Unit/Bed#: E5 -01 I Date of Admission: 5/11/2025   Date of Service: 5/12/2025 I Hospital Day: 1     Please see vascular surgery consult completed by Karen Salter PA-C on 5/12/25.      BELGICA Kemp  5/12/25

## 2025-05-12 NOTE — ASSESSMENT & PLAN NOTE
Lab Results   Component Value Date    HGBA1C 9.3 (H) 04/09/2025    HGBA1C 10.5 (H) 01/30/2025    HGBA1C 8.5 (H) 11/26/2024     Recent Labs     05/10/25  2039 05/11/25  0745 05/11/25  1152 05/11/25  1609   POCGLU 212* 194* 231* 365*       Blood Sugar Average: Last 72 hrs:  (P) [id number]  Hold home regimen including glipizide, linagliptin and metformin  SSI & Accu-Cheks ACHS  Blood sugars elevated in the setting of steroid use  Continue Lantus 5 U qhs  Continue humalog 5 U TID with meals   Restricted carbohydrate diet  Hypoglycemia protocol

## 2025-05-12 NOTE — ASSESSMENT & PLAN NOTE
"LEADs 4/18  Right-50 to 75% stenosis in the distal superficial femoral artery.  Occlusion versus high-grade stenosis in the distal posterior tibial artery. Great toe pressure is below the healing range  Left-occlusive versus high-grade stenosis in the posterior tibial and anterior tibial arteries. Great toe pressure is below the healing range for diabetic patient.  Compared to 7/3/2023-continue stenosis and occlusion on the right. New occlusion of the left. Significant decrease of right RIC.  CTA Abdomen with runoff (5/6)   \"Focal high-grade stenosis of distal right CFA and diffuse atherosclerotic disease of right SFA resulting in moderate to severe stenoses with focal near complete occlusions at the proximal and distal segments. Short segment occlusions of proximal right anterior tibial and peroneal arteries with reconstitution in the medial to distal segments. Right posterior tibial artery is occluded. Occluded left SFA with reconstitution in the distal segment. Left anterior tibial and posterior tibial arteries are occluded. One-vessel runoff of the left lower extremity through the peroneal artery. Focal high-grade stenosis at the proximal right renal artery.\"  Vascular surgery recommendations  Patient will require a femoral endarterectomy with possible bypass scheduled for 5/13 at [facility].  Continue statin & heparin infusion, hold aspirin  "

## 2025-05-12 NOTE — OCCUPATIONAL THERAPY NOTE
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)    Past Medical History  Past Medical History:   Diagnosis Date    Asthma     COVID-19     CVA (cerebral vascular accident) (HCC)     Diabetes mellitus (HCC)     Environmental allergies     Hyperlipidemia     Hypertension     Macular degeneration 07/13/2020    right eye    Orthostatic hypotension     Osteopenia     Pneumonia     Pneumothorax     Sinusitis      Past Surgical History  Past Surgical History:   Procedure Laterality Date    CARPAL TUNNEL RELEASE Left 08/2024    CATARACT EXTRACTION Left 07/2024    COLONOSCOPY      KNEE CARTILAGE SURGERY Right 1964    VASECTOMY      WISDOM TOOTH EXTRACTION      x4        05/12/25 1033   OT Last Visit   OT Visit Date 05/12/25   Note Type   Note type Evaluation   Pain Assessment   Pain Assessment Tool FLACC   Pain Score No Pain  (Denies pain at rest.)   Pain Location/Orientation Orientation: Right;Location: Foot   Hospital Pain Intervention(s) Emotional support   Pain Rating: FLACC (Rest) - Face 0   Pain Rating: FLACC (Rest) - Legs 0   Pain Rating: FLACC (Rest) - Activity 0   Pain Rating: FLACC (Rest) - Cry 0   Pain Rating: FLACC (Rest) - Consolability 0   Score: FLACC (Rest) 0   Pain Rating: FLACC (Activity) - Face 1   Pain Rating: FLACC (Activity) - Legs 0   Pain Rating: FLACC (Activity) - Activity 1   Pain  Rating: FLACC (Activity) - Cry 0   Pain Rating: FLACC (Activity) - Consolability 0   Score: FLACC (Activity) 2   Restrictions/Precautions   Weight Bearing Precautions Per Order Yes   RLE Weight Bearing Per Order WBAT   Other Precautions Fall Risk;Pain;Chair Alarm   Home Living   Type of Home House  ([place])   Home Layout Performs ADLs on one level;Stairs to enter with rails  ([address] w HR)   Bathroom Shower/Tub Walk-in shower   Bathroom Toilet Raised   Bathroom Equipment Grab bars in shower;Shower chair  (Has a stool located out of shower to dry off/dress on.)   Home Equipment Walker;Cane   Additional Comments cane at baseline.   Prior Function   Level of [place] Independent with functional mobility;Other (Comment)  (Prior to present illness, (I) with ADL/ shared IADL tasks with spouse. Currently requiring increased (A) with tasks.)   Lives With Spouse   Receives Help From Family   IADLs Independent with driving;Family/Friend/Other provides medication management;Family/Friend/Other provides meals   Falls in the last 6 months 0   Vocational Retired   Lifestyle   Autonomy PTA, was (I) with ADLs and required (A) with IADLs. Patient lives in a raised ranch, [place] w/ HR, with spouse. Performs ADLs on one level. Walk in shower with grab bar. Raised toilet. RW, cane. Uses cane at baseline, has a shower chair and stool outside of shower. (+) . (-) falls.   Reciprocal Relationships Spouse   Service to Others Retired   General   Additional Pertinent History Comorbidities affecting pt’s functional performance include a significant PMH of: DM2, HTN, asthma, CVA, COPD, COVID-19, neuropathy, L foot drop.  Patient with active OT orders and activity orders for Activity as tolerated, OOB to chair.   Family/Caregiver Present Yes  (Spouse)   ADL   Where Assessed Chair   Eating Assistance 7  Independent   Grooming Assistance 6  Modified Independent   UB Bathing Assistance 6  Modified Independent   LB Bathing Assistance  5  Supervision/Setup   UB Dressing Assistance 6  Modified independent   LB Dressing Assistance 5  Supervision/Setup   Toileting Assistance  5  Supervision/Setup   Transfers   Sit to Stand 6  Modified independent   Additional items Armrests;Increased time required   Stand to Sit 6  Modified independent   Additional items Armrests;Increased time required   Functional Mobility   Functional Mobility 5  Supervision   Additional Comments Limited distances due to pt reporting increased pain w prolonged distances.   Additional items Rolling walker   Balance   Static Sitting Normal   Dynamic Sitting Good   Static Standing Fair +   Dynamic Standing Fair   Ambulatory Fair -   Activity Tolerance   Activity Tolerance Patient limited by pain;Other (Comment)  (effort)   Medical Staff Made Aware Wander PT   Nurse Made Aware Yes   RUE Assessment   RUE Assessment WFL   LUE Assessment   LUE Assessment WFL   Hand Function   Gross Motor Coordination Functional   Fine Motor Coordination Functional   Sensation   Light Touch Partial deficits in the RLE;Partial deficits in the LLE   Vision-Basic Assessment   Current Vision Wears glasses all the time   Vision - Complex Assessment   Ocular Range of Motion Intact   Psychosocial   Patient Behaviors/Mood Cooperative;Pleasant   Perception   Inattention/Neglect Appears intact   Cognition   Overall Cognitive Status WFL   Arousal/Participation Alert;Responsive;Cooperative   Attention Within functional limits   Orientation Level Oriented X4   Memory Within functional limits   Following Commands Follows multistep commands without difficulty   Assessment   Limitation Decreased ADL status;Decreased endurance;Decreased self-care trans;Decreased high-level ADLs  (balance)   Prognosis Good   Assessment Patient is a 79 y.o. year old male seen for OT eval s/p admit to [facility] on 5/11/2025 with sepsis without acute organ dysfunction, atherosclerosis of native arteries of R leg with ulceration of heel and midfoot -  pending vascular intervention. Per podiatry, currently WBAT RLE.  OT consulted to assess ADLs/IADLs/functional mobility and assist w/ D/C planning. Patient demonstrates the following deficits impacting occupational performance: decreased balance, decreased activity tolerance, decreased safety awareness, increased pain, impaired coordination, and decreased skin integrity . These impairments, as well at pt’s NEERU home environment, difficulty performing ADLs, difficulty performing IADLs, difficulty performing transfers/mobility, fall risk , functional decline , and increased reliance on DME , limit pt’s ability to safely engage in all baseline areas of occupation. Pt's CLOF as follows: eating/grooming: Independent and [place], UB ADLs: [place], LB ADLs: supervision , toileting: supervision , bed mobility: DNT, functional transfers: [place], functional mobility: supervision , sitting/standing tolerance: ~4 min. Pt would benefit from continued skilled OT while in acute setting to address deficits as defined above and to maximize (I) w/ ADLs/functional mobility. Occupational performance areas to address include: grooming, bathing/shower, toilet hygiene, dressing, health maintenance, functional mobility, community mobility, clothing management, cleaning, meal prep, and household maintenance. Based on the aforementioned evaluation, functional performance deficits, and assessments, pt has been identified as a moderate complexity evaluation. At this time, recommendation for pt to receive post-acute rehabilitation services at a Level III (minimum resource intensity) due to above deficits and CLOF. OT will continue to follow pt 1-2x/wk to address the goals listed below to  w/in 10-14 days.   Goals   Patient Goals to return home   LTG Time Frame 10-14   Plan   Treatment Interventions ADL retraining;Functional transfer training;Endurance training;Patient/family training;Equipment evaluation/education;Compensatory technique  education;Continued evaluation;Energy conservation;Activityengagement   Goal Expiration Date 05/26/25   OT Treatment Day 0   OT Frequency 1-2x/wk   Discharge Recommendation   Rehab Resource Intensity Level, OT III (Minimum Resource Intensity)   AM-PAC Daily Activity Inpatient   Lower Body Dressing 3   Bathing 3   Toileting 3   Upper Body Dressing 4   Grooming 4   Eating 4   Daily Activity Raw Score 21   Daily Activity Standardized Score (Calc for Raw Score >=11) 44.27   AM-PAC Applied Cognition Inpatient   Following a Speech/Presentation 4   Understanding Ordinary Conversation 4   Taking Medications 4   Remembering Where Things Are Placed or Put Away 4   Remembering List of 4-5 Errands 4   Taking Care of Complicated Tasks 4   Applied Cognition Raw Score 24   Applied Cognition Standardized Score 62.21     Occupational Therapy goals: In 7-14 days:     1- Patient will verbalize and demonstrate use of energy conservation/deep breathing technique and work simplification skills during functional activity with no verbal cues.   2- Patient will verbalize and demonstrate good body mechanics and joint protection techniques during ADLs/IADLs with no verbal cues   3- Pt will complete bed mobility at a Mod I level w/ G balance/safety demonstrated to decrease caregiver assistance required   4- Patient will increase OOB/ sitting tolerance to 2-4 hours per day for increased participation in self care and leisure tasks with no s/s of exertion.   5-Patient will increase standing tolerance time to 5 minutes with unilateral UE support to complete sink level ADLs@ mod I level    6- Pt will improve functional transfers to Mod I on/off all surfaces using DME as needed w/ G balance/safety   7- Patient will complete UB ADLs with Masha utilizing appropriate DME/AE PRN   8- Patient will complete LB ADLs with Masha utilizing appropriate DME/AE PRN   9- Patient will complete toileting tasks with Masha with G hygiene/thoroughness utilizing  appropriate DME/AE PRN   10- Pt will improve functional mobility during ADL/IADL/leisure tasks to Mod I using DME as needed w/ G balance/safety    11- Pt will be attentive 100% of the time during ongoing cognitive assessment w/ G participation to assist w/ safe d/c planning/recommendations   12- Pt will participate in simulated IADL management task to increase independence to Mod I w/ G safety and endurance   13- Pt will increase BUE strength by 1MM grade via AROM/AAROM/PROM exercises to increase independence in ADLs and transfers       Carlita Perez, OTR/L

## 2025-05-12 NOTE — ASSESSMENT & PLAN NOTE
Continue atenolol.  Anticoagulation: Apixaban on hold for anticipated vascular procedure  Currently on heparin infusion

## 2025-05-12 NOTE — PLAN OF CARE
Problem: PAIN - ADULT  Goal: Verbalizes/displays adequate comfort level or baseline comfort level  Description: Interventions:- Encourage patient to monitor pain and request assistance- Assess pain using appropriate pain scale- Administer analgesics based on type and severity of pain and evaluate response- Implement non-pharmacological measures as appropriate and evaluate response- Consider cultural and social influences on pain and pain management- Notify physician/advanced practitioner if interventions unsuccessful or patient reports new pain  Outcome: Progressing     Problem: DISCHARGE PLANNING  Goal: Discharge to home or other facility with appropriate resources  Description: INTERVENTIONS:- Identify barriers to discharge w/patient and caregiver- Arrange for needed discharge resources and transportation as appropriate- Identify discharge learning needs (meds, wound care, etc.)- Arrange for interpretive services to assist at discharge as needed- Refer to Case Management Department for coordinating discharge planning if the patient needs post-hospital services based on physician/advanced practitioner order or complex needs related to functional status, cognitive ability, or social support system  Outcome: Progressing     Problem: SKIN/TISSUE INTEGRITY - ADULT  Goal: Skin Integrity remains intact(Skin Breakdown Prevention)  Description: Assess:-Perform Sae assessment -Clean and moisturize skin -Inspect skin when repositioning, toileting, and assisting with ADLS-Assess under medical devices -Assess extremities for adequate circulation and sensation Bed Management:-Have minimal linens on bed & keep smooth, unwrinkled-Change linens as needed when moist or perspiring-Avoid sitting or lying in one position for more than 2 hours while in bed-Keep HOB at 30 degrees Toileting:-Offer bedside commode-Assess for incontinence -Use incontinent care products after each incontinent episode Activity:-Mobilize patient 3 times  a day-Encourage activity and walks on unit-Encourage or provide ROM exercises -Turn and reposition patient every 2 Hours-Use appropriate equipment to lift or move patient in bed-Instruct/ Assist with weight shifting  when out of bed in chair-Consider limitation of chair time 2 hour intervalsSkin Care:-Avoid use of baby powder, tape, friction and shearing, hot water or constrictive clothing-Relieve pressure over bony prominences Do not massage red bony areasNext Steps:-Teach patient strategies to minimize risks Consider consults to  interdisciplinary teams   Outcome: Progressing  Goal: Incision(s), wounds(s) or drain site(s) healing without S/S of infection  Description: INTERVENTIONS- Assess and document dressing, incision, wound bed, drain sites and surrounding tissue- Provide patient and family education- Perform skin care/dressing changes every  Outcome: Progressing  Goal: Pressure injury heals and does not worsen  Description: Interventions:- Implement low air loss mattress or specialty surface (Criteria met)- Apply silicone foam dressing- Instruct/assist with weight shifting every  minutes when in chair - Limit chair time to  hour intervals- Use special pressure reducing interventions such as  when in chair - Apply fecal or urinary incontinence containment device - Perform passive or active ROM every - Turn and reposition patient & offload bony prominences every  hours - Utilize friction reducing device or surface for transfers - Consider consults to  interdisciplinary teams such as - Use incontinent care products after each incontinent episode such as - Consider nutrition services referral as needed  Outcome: Progressing

## 2025-05-13 ENCOUNTER — ANESTHESIA (INPATIENT)
Dept: PERIOP | Facility: HOSPITAL | Age: 80
End: 2025-05-13
Payer: COMMERCIAL

## 2025-05-13 ENCOUNTER — APPOINTMENT (INPATIENT)
Dept: RADIOLOGY | Facility: HOSPITAL | Age: 80
DRG: 853 | End: 2025-05-13
Payer: COMMERCIAL

## 2025-05-13 LAB
ABO GROUP BLD: NORMAL
ABO GROUP BLD: NORMAL
ALBUMIN SERPL BCG-MCNC: 3.1 G/DL (ref 3.5–5)
ALP SERPL-CCNC: 62 U/L (ref 34–104)
ALT SERPL W P-5'-P-CCNC: 22 U/L (ref 7–52)
ANION GAP SERPL CALCULATED.3IONS-SCNC: 6 MMOL/L (ref 4–13)
APTT PPP: 28 SECONDS (ref 23–34)
AST SERPL W P-5'-P-CCNC: 46 U/L (ref 13–39)
BASE EXCESS BLDA CALC-SCNC: -3 MMOL/L (ref -2–3)
BILIRUB SERPL-MCNC: 0.33 MG/DL (ref 0.2–1)
BLD GP AB SCN SERPL QL: NEGATIVE
BUN SERPL-MCNC: 13 MG/DL (ref 5–25)
CA-I BLD-SCNC: 1.09 MMOL/L (ref 1.12–1.32)
CALCIUM ALBUM COR SERPL-MCNC: 9.4 MG/DL (ref 8.3–10.1)
CALCIUM SERPL-MCNC: 8.7 MG/DL (ref 8.4–10.2)
CHLORIDE SERPL-SCNC: 101 MMOL/L (ref 96–108)
CO2 SERPL-SCNC: 26 MMOL/L (ref 21–32)
CREAT SERPL-MCNC: 0.93 MG/DL (ref 0.6–1.3)
ERYTHROCYTE [DISTWIDTH] IN BLOOD BY AUTOMATED COUNT: 13.8 % (ref 11.6–15.1)
GFR SERPL CREATININE-BSD FRML MDRD: 77 ML/MIN/1.73SQ M
GLUCOSE SERPL-MCNC: 188 MG/DL (ref 65–140)
GLUCOSE SERPL-MCNC: 206 MG/DL (ref 65–140)
GLUCOSE SERPL-MCNC: 239 MG/DL (ref 65–140)
GLUCOSE SERPL-MCNC: 262 MG/DL (ref 65–140)
GLUCOSE SERPL-MCNC: 269 MG/DL (ref 65–140)
GLUCOSE SERPL-MCNC: 286 MG/DL (ref 65–140)
HCO3 BLDA-SCNC: 21.2 MMOL/L (ref 22–28)
HCT VFR BLD AUTO: 32.7 % (ref 36.5–49.3)
HCT VFR BLD CALC: 25 % (ref 36.5–49.3)
HGB BLD-MCNC: 10.8 G/DL (ref 12–17)
HGB BLDA-MCNC: 8.5 G/DL (ref 12–17)
KCT BLD-ACNC: 201 SEC (ref 89–137)
KCT BLD-ACNC: 201 SEC (ref 89–137)
KCT BLD-ACNC: 207 SEC (ref 89–137)
KCT BLD-ACNC: 219 SEC (ref 89–137)
MCH RBC QN AUTO: 29 PG (ref 26.8–34.3)
MCHC RBC AUTO-ENTMCNC: 33 G/DL (ref 31.4–37.4)
MCV RBC AUTO: 88 FL (ref 82–98)
PCO2 BLD: 22 MMOL/L (ref 21–32)
PCO2 BLD: 35.8 MM HG (ref 36–44)
PH BLD: 7.38 [PH] (ref 7.35–7.45)
PLATELET # BLD AUTO: 256 THOUSANDS/UL (ref 149–390)
PMV BLD AUTO: 9.5 FL (ref 8.9–12.7)
PO2 BLD: 176 MM HG (ref 75–129)
POTASSIUM BLD-SCNC: 4.6 MMOL/L (ref 3.5–5.3)
POTASSIUM SERPL-SCNC: 4.2 MMOL/L (ref 3.5–5.3)
PROT SERPL-MCNC: 6.3 G/DL (ref 6.4–8.4)
RBC # BLD AUTO: 3.73 MILLION/UL (ref 3.88–5.62)
RH BLD: POSITIVE
RH BLD: POSITIVE
SAO2 % BLD FROM PO2: 100 % (ref 60–85)
SODIUM BLD-SCNC: 134 MMOL/L (ref 136–145)
SODIUM SERPL-SCNC: 133 MMOL/L (ref 135–147)
SPECIMEN EXPIRATION DATE: NORMAL
SPECIMEN SOURCE: ABNORMAL
WBC # BLD AUTO: 7.93 THOUSAND/UL (ref 4.31–10.16)

## 2025-05-13 PROCEDURE — C1725 CATH, TRANSLUMIN NON-LASER: HCPCS | Performed by: SURGERY

## 2025-05-13 PROCEDURE — 99223 1ST HOSP IP/OBS HIGH 75: CPT

## 2025-05-13 PROCEDURE — B41FYZZ FLUOROSCOPY OF RIGHT LOWER EXTREMITY ARTERIES USING OTHER CONTRAST: ICD-10-PCS | Performed by: SURGERY

## 2025-05-13 PROCEDURE — 047K05Z DILATION OF RIGHT FEMORAL ARTERY WITH TWO DRUG-ELUTING INTRALUMINAL DEVICES, OPEN APPROACH: ICD-10-PCS | Performed by: SURGERY

## 2025-05-13 PROCEDURE — 37226 PR REVSC OPN/PRQ FEM/POP W/STNT/ANGIOP SM VSL: CPT | Performed by: SURGERY

## 2025-05-13 PROCEDURE — C1769 GUIDE WIRE: HCPCS | Performed by: SURGERY

## 2025-05-13 PROCEDURE — 85730 THROMBOPLASTIN TIME PARTIAL: CPT | Performed by: INTERNAL MEDICINE

## 2025-05-13 PROCEDURE — 82948 REAGENT STRIP/BLOOD GLUCOSE: CPT

## 2025-05-13 PROCEDURE — 84295 ASSAY OF SERUM SODIUM: CPT

## 2025-05-13 PROCEDURE — C1894 INTRO/SHEATH, NON-LASER: HCPCS | Performed by: SURGERY

## 2025-05-13 PROCEDURE — 82947 ASSAY GLUCOSE BLOOD QUANT: CPT

## 2025-05-13 PROCEDURE — C1887 CATHETER, GUIDING: HCPCS | Performed by: SURGERY

## 2025-05-13 PROCEDURE — 82330 ASSAY OF CALCIUM: CPT

## 2025-05-13 PROCEDURE — 86850 RBC ANTIBODY SCREEN: CPT | Performed by: NURSE PRACTITIONER

## 2025-05-13 PROCEDURE — 85014 HEMATOCRIT: CPT

## 2025-05-13 PROCEDURE — 84132 ASSAY OF SERUM POTASSIUM: CPT

## 2025-05-13 PROCEDURE — C2623 CATH, TRANSLUMIN, DRUG-COAT: HCPCS | Performed by: SURGERY

## 2025-05-13 PROCEDURE — 99232 SBSQ HOSP IP/OBS MODERATE 35: CPT | Performed by: INTERNAL MEDICINE

## 2025-05-13 PROCEDURE — 86923 COMPATIBILITY TEST ELECTRIC: CPT

## 2025-05-13 PROCEDURE — C1874 STENT, COATED/COV W/DEL SYS: HCPCS | Performed by: SURGERY

## 2025-05-13 PROCEDURE — 75710 ARTERY X-RAYS ARM/LEG: CPT

## 2025-05-13 PROCEDURE — 80053 COMPREHEN METABOLIC PANEL: CPT | Performed by: INTERNAL MEDICINE

## 2025-05-13 PROCEDURE — 85027 COMPLETE CBC AUTOMATED: CPT | Performed by: INTERNAL MEDICINE

## 2025-05-13 PROCEDURE — 04UK0KZ SUPPLEMENT RIGHT FEMORAL ARTERY WITH NONAUTOLOGOUS TISSUE SUBSTITUTE, OPEN APPROACH: ICD-10-PCS | Performed by: SURGERY

## 2025-05-13 PROCEDURE — 85347 COAGULATION TIME ACTIVATED: CPT

## 2025-05-13 PROCEDURE — NC001 PR NO CHARGE: Performed by: SURGERY

## 2025-05-13 PROCEDURE — 37228 PR REVSC OPN/PRQ TIB/PERO W/ANGIOPLASTY UNI: CPT | Performed by: SURGERY

## 2025-05-13 PROCEDURE — 82803 BLOOD GASES ANY COMBINATION: CPT

## 2025-05-13 PROCEDURE — 04CK0ZZ EXTIRPATION OF MATTER FROM RIGHT FEMORAL ARTERY, OPEN APPROACH: ICD-10-PCS | Performed by: SURGERY

## 2025-05-13 PROCEDURE — C1781 MESH (IMPLANTABLE): HCPCS | Performed by: SURGERY

## 2025-05-13 PROCEDURE — 35371 RECHANNELING OF ARTERY: CPT | Performed by: SURGERY

## 2025-05-13 PROCEDURE — 86900 BLOOD TYPING SEROLOGIC ABO: CPT | Performed by: NURSE PRACTITIONER

## 2025-05-13 PROCEDURE — 047P0ZZ DILATION OF RIGHT ANTERIOR TIBIAL ARTERY, OPEN APPROACH: ICD-10-PCS | Performed by: SURGERY

## 2025-05-13 PROCEDURE — 86901 BLOOD TYPING SEROLOGIC RH(D): CPT | Performed by: NURSE PRACTITIONER

## 2025-05-13 DEVICE — XENOSURE BIOLOGIC PATCH, 1CM X 14CM, EIFU
Type: IMPLANTABLE DEVICE | Site: LEG | Status: FUNCTIONAL
Brand: XENOSURE BIOLOGIC PATCH

## 2025-05-13 DEVICE — VIABAHN SX ENDO HEPARIN 18 RO 7MMX5CM 7FR 120CM CATH
Type: IMPLANTABLE DEVICE | Status: FUNCTIONAL
Brand: GORE VIABAHN ENDOPROSTHESIS WITH HEPARIN

## 2025-05-13 DEVICE — DRUG-ELUTING VASCULAR STENT SYSTEM
Type: IMPLANTABLE DEVICE | Status: FUNCTIONAL
Brand: ELUVIA™

## 2025-05-13 DEVICE — VIABAHN SX ENDO HEPARIN 18 RO 6MMX5CM 6FR 120CM CATH
Type: IMPLANTABLE DEVICE | Status: FUNCTIONAL
Brand: GORE VIABAHN ENDOPROSTHESIS WITH HEPARIN

## 2025-05-13 RX ORDER — HYDROMORPHONE HCL/PF 1 MG/ML
0.5 SYRINGE (ML) INJECTION
Status: DISCONTINUED | OUTPATIENT
Start: 2025-05-13 | End: 2025-05-13 | Stop reason: HOSPADM

## 2025-05-13 RX ORDER — CEFAZOLIN SODIUM 1 G/3ML
INJECTION, POWDER, FOR SOLUTION INTRAMUSCULAR; INTRAVENOUS AS NEEDED
Status: DISCONTINUED | OUTPATIENT
Start: 2025-05-13 | End: 2025-05-13

## 2025-05-13 RX ORDER — SODIUM CHLORIDE, SODIUM LACTATE, POTASSIUM CHLORIDE, CALCIUM CHLORIDE 600; 310; 30; 20 MG/100ML; MG/100ML; MG/100ML; MG/100ML
125 INJECTION, SOLUTION INTRAVENOUS CONTINUOUS
Status: DISCONTINUED | OUTPATIENT
Start: 2025-05-13 | End: 2025-05-13

## 2025-05-13 RX ORDER — ROCURONIUM BROMIDE 10 MG/ML
INJECTION, SOLUTION INTRAVENOUS AS NEEDED
Status: DISCONTINUED | OUTPATIENT
Start: 2025-05-13 | End: 2025-05-13

## 2025-05-13 RX ORDER — ALBUMIN (HUMAN) 12.5 G/50ML
SOLUTION INTRAVENOUS AS NEEDED
Status: DISCONTINUED | OUTPATIENT
Start: 2025-05-13 | End: 2025-05-13

## 2025-05-13 RX ORDER — FENTANYL CITRATE 50 UG/ML
INJECTION, SOLUTION INTRAMUSCULAR; INTRAVENOUS AS NEEDED
Status: DISCONTINUED | OUTPATIENT
Start: 2025-05-13 | End: 2025-05-13

## 2025-05-13 RX ORDER — PROMETHAZINE HYDROCHLORIDE 25 MG/ML
6.25 INJECTION, SOLUTION INTRAMUSCULAR; INTRAVENOUS ONCE AS NEEDED
Status: DISCONTINUED | OUTPATIENT
Start: 2025-05-13 | End: 2025-05-13 | Stop reason: HOSPADM

## 2025-05-13 RX ORDER — HYDROMORPHONE HCL/PF 1 MG/ML
SYRINGE (ML) INJECTION AS NEEDED
Status: DISCONTINUED | OUTPATIENT
Start: 2025-05-13 | End: 2025-05-13

## 2025-05-13 RX ORDER — SENNOSIDES 8.6 MG
1 TABLET ORAL DAILY
Status: DISCONTINUED | OUTPATIENT
Start: 2025-05-14 | End: 2025-05-28 | Stop reason: HOSPADM

## 2025-05-13 RX ORDER — HEPARIN SODIUM 1000 [USP'U]/ML
INJECTION, SOLUTION INTRAVENOUS; SUBCUTANEOUS AS NEEDED
Status: DISCONTINUED | OUTPATIENT
Start: 2025-05-13 | End: 2025-05-13

## 2025-05-13 RX ORDER — CLOPIDOGREL BISULFATE 75 MG/1
75 TABLET ORAL DAILY
Status: DISCONTINUED | OUTPATIENT
Start: 2025-05-14 | End: 2025-05-28 | Stop reason: HOSPADM

## 2025-05-13 RX ORDER — ONDANSETRON 2 MG/ML
INJECTION INTRAMUSCULAR; INTRAVENOUS AS NEEDED
Status: DISCONTINUED | OUTPATIENT
Start: 2025-05-13 | End: 2025-05-13

## 2025-05-13 RX ORDER — LIDOCAINE HYDROCHLORIDE 20 MG/ML
INJECTION, SOLUTION EPIDURAL; INFILTRATION; INTRACAUDAL; PERINEURAL AS NEEDED
Status: DISCONTINUED | OUTPATIENT
Start: 2025-05-13 | End: 2025-05-13

## 2025-05-13 RX ORDER — MAGNESIUM HYDROXIDE 1200 MG/15ML
LIQUID ORAL AS NEEDED
Status: DISCONTINUED | OUTPATIENT
Start: 2025-05-13 | End: 2025-05-13 | Stop reason: HOSPADM

## 2025-05-13 RX ORDER — HEPARIN SODIUM 200 [USP'U]/100ML
INJECTION, SOLUTION INTRAVENOUS
Status: DISCONTINUED | OUTPATIENT
Start: 2025-05-13 | End: 2025-05-13

## 2025-05-13 RX ORDER — PROTAMINE SULFATE 10 MG/ML
INJECTION, SOLUTION INTRAVENOUS AS NEEDED
Status: DISCONTINUED | OUTPATIENT
Start: 2025-05-13 | End: 2025-05-13

## 2025-05-13 RX ORDER — IODIXANOL 320 MG/ML
INJECTION, SOLUTION INTRAVASCULAR AS NEEDED
Status: DISCONTINUED | OUTPATIENT
Start: 2025-05-13 | End: 2025-05-13 | Stop reason: HOSPADM

## 2025-05-13 RX ORDER — ONDANSETRON 2 MG/ML
4 INJECTION INTRAMUSCULAR; INTRAVENOUS ONCE AS NEEDED
Status: DISCONTINUED | OUTPATIENT
Start: 2025-05-13 | End: 2025-05-13 | Stop reason: HOSPADM

## 2025-05-13 RX ORDER — SODIUM CHLORIDE 9 MG/ML
INJECTION, SOLUTION INTRAVENOUS CONTINUOUS PRN
Status: DISCONTINUED | OUTPATIENT
Start: 2025-05-13 | End: 2025-05-13

## 2025-05-13 RX ORDER — HEPARIN SODIUM 5000 [USP'U]/ML
5000 INJECTION, SOLUTION INTRAVENOUS; SUBCUTANEOUS EVERY 8 HOURS SCHEDULED
Status: DISCONTINUED | OUTPATIENT
Start: 2025-05-13 | End: 2025-05-14

## 2025-05-13 RX ORDER — AMLODIPINE BESYLATE 5 MG/1
5 TABLET ORAL 2 TIMES DAILY
Status: DISCONTINUED | OUTPATIENT
Start: 2025-05-14 | End: 2025-05-26

## 2025-05-13 RX ORDER — MEPERIDINE HYDROCHLORIDE 25 MG/ML
12.5 INJECTION INTRAMUSCULAR; INTRAVENOUS; SUBCUTANEOUS ONCE AS NEEDED
Status: DISCONTINUED | OUTPATIENT
Start: 2025-05-13 | End: 2025-05-13 | Stop reason: HOSPADM

## 2025-05-13 RX ORDER — FENTANYL CITRATE/PF 50 MCG/ML
50 SYRINGE (ML) INJECTION
Status: DISCONTINUED | OUTPATIENT
Start: 2025-05-13 | End: 2025-05-13 | Stop reason: HOSPADM

## 2025-05-13 RX ORDER — ONDANSETRON 2 MG/ML
4 INJECTION INTRAMUSCULAR; INTRAVENOUS EVERY 6 HOURS PRN
Status: DISCONTINUED | OUTPATIENT
Start: 2025-05-13 | End: 2025-05-28 | Stop reason: HOSPADM

## 2025-05-13 RX ORDER — CLOPIDOGREL BISULFATE 75 MG/1
300 TABLET ORAL ONCE
Status: COMPLETED | OUTPATIENT
Start: 2025-05-13 | End: 2025-05-13

## 2025-05-13 RX ORDER — PROPOFOL 10 MG/ML
INJECTION, EMULSION INTRAVENOUS AS NEEDED
Status: DISCONTINUED | OUTPATIENT
Start: 2025-05-13 | End: 2025-05-13

## 2025-05-13 RX ADMIN — Medication 1 TABLET: at 08:28

## 2025-05-13 RX ADMIN — INSULIN GLARGINE 5 UNITS: 100 INJECTION, SOLUTION SUBCUTANEOUS at 21:55

## 2025-05-13 RX ADMIN — ALBUMIN (HUMAN) 25 G: 0.25 INJECTION, SOLUTION INTRAVENOUS at 12:15

## 2025-05-13 RX ADMIN — AMLODIPINE BESYLATE 5 MG: 5 TABLET ORAL at 08:28

## 2025-05-13 RX ADMIN — SODIUM CHLORIDE, SODIUM LACTATE, POTASSIUM CHLORIDE, AND CALCIUM CHLORIDE: .6; .31; .03; .02 INJECTION, SOLUTION INTRAVENOUS at 14:26

## 2025-05-13 RX ADMIN — ROCURONIUM 30 MG: 50 INJECTION, SOLUTION INTRAVENOUS at 13:57

## 2025-05-13 RX ADMIN — HEPARIN SODIUM 4000 UNITS: 1000 INJECTION INTRAVENOUS; SUBCUTANEOUS at 15:03

## 2025-05-13 RX ADMIN — ROCURONIUM 20 MG: 50 INJECTION, SOLUTION INTRAVENOUS at 14:31

## 2025-05-13 RX ADMIN — OXYCODONE 5 MG: 5 TABLET ORAL at 19:45

## 2025-05-13 RX ADMIN — FENTANYL CITRATE 50 MCG: 50 INJECTION INTRAMUSCULAR; INTRAVENOUS at 12:03

## 2025-05-13 RX ADMIN — ASPIRIN 81 MG: 81 TABLET, CHEWABLE ORAL at 08:28

## 2025-05-13 RX ADMIN — FENTANYL CITRATE 25 MCG: 50 INJECTION INTRAMUSCULAR; INTRAVENOUS at 12:31

## 2025-05-13 RX ADMIN — PHENYLEPHRINE HYDROCHLORIDE 20 MCG/MIN: 10 INJECTION INTRAVENOUS at 12:51

## 2025-05-13 RX ADMIN — ACETAMINOPHEN 975 MG: 325 TABLET, FILM COATED ORAL at 05:19

## 2025-05-13 RX ADMIN — LIDOCAINE HYDROCHLORIDE 80 MG: 20 INJECTION, SOLUTION EPIDURAL; INFILTRATION; INTRACAUDAL at 12:04

## 2025-05-13 RX ADMIN — PROTAMINE SULFATE 60 MG: 10 INJECTION, SOLUTION INTRAVENOUS at 17:35

## 2025-05-13 RX ADMIN — HEPARIN SODIUM 2000 UNITS: 1000 INJECTION INTRAVENOUS; SUBCUTANEOUS at 16:13

## 2025-05-13 RX ADMIN — SUGAMMADEX 100 MG: 100 INJECTION, SOLUTION INTRAVENOUS at 18:20

## 2025-05-13 RX ADMIN — CEFAZOLIN 2000 MG: 1 INJECTION, POWDER, FOR SOLUTION INTRAMUSCULAR; INTRAVENOUS at 16:04

## 2025-05-13 RX ADMIN — HEPARIN SODIUM 7000 UNITS: 1000 INJECTION INTRAVENOUS; SUBCUTANEOUS at 13:50

## 2025-05-13 RX ADMIN — LISINOPRIL 20 MG: 20 TABLET ORAL at 08:28

## 2025-05-13 RX ADMIN — ROCURONIUM 20 MG: 50 INJECTION, SOLUTION INTRAVENOUS at 12:48

## 2025-05-13 RX ADMIN — ROCURONIUM 10 MG: 50 INJECTION, SOLUTION INTRAVENOUS at 17:23

## 2025-05-13 RX ADMIN — HYDROMORPHONE HYDROCHLORIDE 0.2 MG: 1 INJECTION, SOLUTION INTRAMUSCULAR; INTRAVENOUS; SUBCUTANEOUS at 18:00

## 2025-05-13 RX ADMIN — PREDNISONE 5 MG: 5 TABLET ORAL at 08:28

## 2025-05-13 RX ADMIN — SUGAMMADEX 100 MG: 100 INJECTION, SOLUTION INTRAVENOUS at 18:17

## 2025-05-13 RX ADMIN — HYDROMORPHONE HYDROCHLORIDE 0.2 MG: 1 INJECTION, SOLUTION INTRAMUSCULAR; INTRAVENOUS; SUBCUTANEOUS at 17:49

## 2025-05-13 RX ADMIN — ROCURONIUM 20 MG: 50 INJECTION, SOLUTION INTRAVENOUS at 16:16

## 2025-05-13 RX ADMIN — ATENOLOL 25 MG: 50 TABLET ORAL at 08:30

## 2025-05-13 RX ADMIN — ROCURONIUM 50 MG: 50 INJECTION, SOLUTION INTRAVENOUS at 12:04

## 2025-05-13 RX ADMIN — PROPOFOL 20 MG: 10 INJECTION, EMULSION INTRAVENOUS at 12:44

## 2025-05-13 RX ADMIN — SODIUM CHLORIDE, SODIUM LACTATE, POTASSIUM CHLORIDE, AND CALCIUM CHLORIDE: .6; .31; .03; .02 INJECTION, SOLUTION INTRAVENOUS at 11:53

## 2025-05-13 RX ADMIN — HEPARIN SODIUM 2000 UNITS: 1000 INJECTION INTRAVENOUS; SUBCUTANEOUS at 13:58

## 2025-05-13 RX ADMIN — ONDANSETRON 4 MG: 2 INJECTION INTRAMUSCULAR; INTRAVENOUS at 17:35

## 2025-05-13 RX ADMIN — MORPHINE SULFATE 2 MG: 2 INJECTION, SOLUTION INTRAMUSCULAR; INTRAVENOUS at 21:51

## 2025-05-13 RX ADMIN — INSULIN LISPRO 3 UNITS: 100 INJECTION, SOLUTION INTRAVENOUS; SUBCUTANEOUS at 22:27

## 2025-05-13 RX ADMIN — HYDROMORPHONE HYDROCHLORIDE 0.2 MG: 1 INJECTION, SOLUTION INTRAMUSCULAR; INTRAVENOUS; SUBCUTANEOUS at 13:11

## 2025-05-13 RX ADMIN — HYDROMORPHONE HYDROCHLORIDE 0.2 MG: 1 INJECTION, SOLUTION INTRAMUSCULAR; INTRAVENOUS; SUBCUTANEOUS at 13:21

## 2025-05-13 RX ADMIN — METRONIDAZOLE 500 MG: 500 INJECTION, SOLUTION INTRAVENOUS at 05:12

## 2025-05-13 RX ADMIN — ACETAMINOPHEN 975 MG: 325 TABLET, FILM COATED ORAL at 21:48

## 2025-05-13 RX ADMIN — PROPOFOL 150 MG: 10 INJECTION, EMULSION INTRAVENOUS at 12:03

## 2025-05-13 RX ADMIN — ROCURONIUM 20 MG: 50 INJECTION, SOLUTION INTRAVENOUS at 15:14

## 2025-05-13 RX ADMIN — HYDROMORPHONE HYDROCHLORIDE 0.2 MG: 1 INJECTION, SOLUTION INTRAMUSCULAR; INTRAVENOUS; SUBCUTANEOUS at 17:40

## 2025-05-13 RX ADMIN — MONTELUKAST 10 MG: 10 TABLET, FILM COATED ORAL at 21:49

## 2025-05-13 RX ADMIN — CEFAZOLIN 2000 MG: 1 INJECTION, POWDER, FOR SOLUTION INTRAMUSCULAR; INTRAVENOUS at 12:15

## 2025-05-13 RX ADMIN — CEFAZOLIN SODIUM 2000 MG: 2 SOLUTION INTRAVENOUS at 06:21

## 2025-05-13 RX ADMIN — FENTANYL CITRATE 25 MCG: 50 INJECTION INTRAMUSCULAR; INTRAVENOUS at 12:44

## 2025-05-13 RX ADMIN — HEPARIN SODIUM 3000 UNITS: 1000 INJECTION INTRAVENOUS; SUBCUTANEOUS at 15:39

## 2025-05-13 RX ADMIN — CLOPIDOGREL BISULFATE 300 MG: 75 TABLET, FILM COATED ORAL at 19:45

## 2025-05-13 RX ADMIN — FAMOTIDINE 20 MG: 20 TABLET, FILM COATED ORAL at 08:28

## 2025-05-13 RX ADMIN — HEPARIN SODIUM 5000 UNITS: 5000 INJECTION INTRAVENOUS; SUBCUTANEOUS at 21:49

## 2025-05-13 RX ADMIN — ONDANSETRON 4 MG: 2 INJECTION INTRAMUSCULAR; INTRAVENOUS at 20:05

## 2025-05-13 RX ADMIN — BUDESONIDE, GLYCOPYRROLATE, AND FORMOTEROL FUMARATE 2 PUFF: 160; 9; 4.8 AEROSOL, METERED RESPIRATORY (INHALATION) at 08:28

## 2025-05-13 RX ADMIN — HEPARIN SODIUM 3000 UNITS: 1000 INJECTION INTRAVENOUS; SUBCUTANEOUS at 16:56

## 2025-05-13 RX ADMIN — CEFAZOLIN SODIUM 2000 MG: 2 SOLUTION INTRAVENOUS at 22:02

## 2025-05-13 RX ADMIN — HEPARIN SODIUM 3000 UNITS: 1000 INJECTION INTRAVENOUS; SUBCUTANEOUS at 14:26

## 2025-05-13 RX ADMIN — PROPOFOL 30 MG: 10 INJECTION, EMULSION INTRAVENOUS at 18:20

## 2025-05-13 RX ADMIN — SODIUM CHLORIDE: 0.9 INJECTION, SOLUTION INTRAVENOUS at 12:15

## 2025-05-13 RX ADMIN — SODIUM CHLORIDE: 0.9 INJECTION, SOLUTION INTRAVENOUS at 15:39

## 2025-05-13 NOTE — PLAN OF CARE
Problem: PAIN - ADULT  Goal: Verbalizes/displays adequate comfort level or baseline comfort level  Description: Interventions:- Encourage patient to monitor pain and request assistance- Assess pain using appropriate pain scale- Administer analgesics based on type and severity of pain and evaluate response- Implement non-pharmacological measures as appropriate and evaluate response- Consider cultural and social influences on pain and pain management- Notify physician/advanced practitioner if interventions unsuccessful or patient reports new pain  Outcome: Progressing     Problem: DISCHARGE PLANNING  Goal: Discharge to home or other facility with appropriate resources  Description: INTERVENTIONS:- Identify barriers to discharge w/patient and caregiver- Arrange for needed discharge resources and transportation as appropriate- Identify discharge learning needs (meds, wound care, etc.)- Arrange for interpretive services to assist at discharge as needed- Refer to Case Management Department for coordinating discharge planning if the patient needs post-hospital services based on physician/advanced practitioner order or complex needs related to functional status, cognitive ability, or social support system  Outcome: Progressing     Problem: SKIN/TISSUE INTEGRITY - ADULT  Goal: Skin Integrity remains intact(Skin Breakdown Prevention)  Description: Assess:-Perform Sae assessment -Clean and moisturize skin -Inspect skin when repositioning, toileting, and assisting with ADLS-Assess under medical devices -Assess extremities for adequate circulation and sensation Bed Management:-Have minimal linens on bed & keep smooth, unwrinkled-Change linens as needed when moist or perspiring-Avoid sitting or lying in one position for more than 2 hours while in bed-Keep HOB at 30 degrees Toileting:-Offer bedside commode-Assess for incontinence -Use incontinent care products after each incontinent episode Activity:-Mobilize patient 3 times  a day-Encourage activity and walks on unit-Encourage or provide ROM exercises -Turn and reposition patient every 2 Hours-Use appropriate equipment to lift or move patient in bed-Instruct/ Assist with weight shifting  when out of bed in chair-Consider limitation of chair time 2 hour intervalsSkin Care:-Avoid use of baby powder, tape, friction and shearing, hot water or constrictive clothing-Relieve pressure over bony prominences Do not massage red bony areasNext Steps:-Teach patient strategies to minimize risks Consider consults to  interdisciplinary teams   Outcome: Progressing  Goal: Incision(s), wounds(s) or drain site(s) healing without S/S of infection  Description: INTERVENTIONS- Assess and document dressing, incision, wound bed, drain sites and surrounding tissue- Provide patient and family education- Perform skin care/dressing changes every   Outcome: Progressing  Goal: Pressure injury heals and does not worsen  Description: Interventions:- Implement low air loss mattress or specialty surface (Criteria met)- Apply silicone foam dressing- Instruct/assist with weight shifting every  minutes when in chair - Limit chair time to  hour intervals- Use special pressure reducing interventions such as  when in chair - Apply fecal or urinary incontinence containment device - Perform passive or active ROM every - Turn and reposition patient & offload bony prominences every  hours - Utilize friction reducing device or surface for transfers - Consider consults to  interdisciplinary teams such as - Use incontinent care products after each incontinent episode such as- Consider nutrition services referral as needed  Outcome: Progressing

## 2025-05-13 NOTE — ANESTHESIA POSTPROCEDURE EVALUATION
Post-Op Assessment Note    CV Status:  Stable  Pain Score: 0    Pain management: adequate       Mental Status:  Awake and sleepy   Hydration Status:  Euvolemic   PONV Controlled:  Controlled   Airway Patency:  Patent and adequate  Two or more mitigation strategies used for obstructive sleep apnea   Post Op Vitals Reviewed: Yes    No anethesia notable event occurred.    Staff: CRNA, Anesthesiologist           Last Filed PACU Vitals:  Vitals Value Taken Time   Temp 36.7    Pulse 68    /52    Resp 20    SpO2 98

## 2025-05-13 NOTE — PROGRESS NOTES
Progress Note - Vascular Surgery   Name: Gold Van 79 y.o. 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 Pt admitted and started on ABX. Pt seen by podiatry w/ plans for R partial 5th ray resection pending revascularization. Pt was transferred to [facility] on 5/11/25 for vascular surgical intervention.     Vascular surgery consulted for worsening R 5th met wound in the setting of PAD. CTA abd shows L CFA/SFA stenosis w/ focal occlusions and AT/peroneal occlusions w/ reconstitution. LEAD shows R PTA occlusion and R RIC: 0.42/15/16.    Diagnostics:  -5/7/25 LE vein mapping: Right: GSV patent from groin to ankle, diameter 2.2mm to 12.6mm. Left: GSV is patent from groin to ankle, diameter 2.1mm to 9mm.   -5/7/25 LE venous duplex: (-) DVT  -5/6/25 CTA abd w/ runoff: B/L EDY/EIA/IIA patent w/ atherosclerosis. Right: Focal high-grade stenosis of distal R CFA and diffuse atherosclerotic disease of R SFA resulting in moderate to severe stenoses with focal near complete occlusions at the proximal and distal segments. Short segment occlusions of proximal R AT and peroneal arteries with reconstitution. R PTA is occluded. Left: Occluded L SFA with reconstitution. L YUNG and PTA occluded. One-vessel runoff LLE through peroneal artery.  -4/18/25 LEAD: Right: >75% prox SFA and 50-75% dSFA stenosis. Distal PTA occlusion. R RIC: 0.42/15/16. Left: PTA and YUNG occlusions. L RIC: 1.06/85/45.   -4/17/25 MRI R foot: Possible early OM in R 5th met    Plan:  -R 5th met wound (+) OM in the setting of PAD  -CTA shows L CFA/SFA stenosis w/  focal occlusions and AT/peroneal occlusions w/ reconstitution  -LEAD shows R PTA occlusion and R RIC: 0.42/15/16  -Plan R femoral endarterectomy w/ antegrade arteriogram and endovascular intervention vs possible bypass; scheduled for today (5/13)  -Please keep pt NPO today for OR  -Pt seen by cardiology for surgical risk stratification; input appreciated. Per cardiology, patient is at appropriate risk to proceed with surgery   -Continue ASA and lipitor for medical management of PAD  -Continue ABX for R foot wound per primary team   -Continue to hold eliquis until all vascular and podiatric procedures are completed; hold heparin gtt today for OR  -Podiatry following w/ plans for R partial 5th ray resection pending revascularization; input appreciated  -Endo consult placed for assistance w/ DM control  -Continue medical management per primary team  -Will discuss with Dr. Aiken    Subjective : Pt seen for exam while sitting on his bed; NAD. Pt reports R foot pain at site of wound and otherwise, denies any further complaints at this time.    Objective :  Temp:  [98.1 °F (36.7 °C)-98.7 °F (37.1 °C)] 98.7 °F (37.1 °C)  HR:  [74-83] 74  BP: (127-143)/(57-70) 132/63  Resp:  [16-19] 18  SpO2:  [90 %-96 %] 93 %  O2 Device: None (Room air)     I/O         05/11 0701  05/12 0700 05/12 0701  05/13 0700 05/13 0701  05/14 0700    P.O.  540     Total Intake(mL/kg)  540 (7.3)     Urine (mL/kg/hr) 800 1300 (0.7) 300 (1.3)    Total Output 800 1300 300    Net -800 -760 -300                   Physical Exam  Vitals and nursing note reviewed.   Constitutional:       General: He is awake. He is not in acute distress.     Appearance: Normal appearance. He is well-developed.   HENT:      Head: Normocephalic and atraumatic.   Cardiovascular:      Rate and Rhythm: Normal rate and regular rhythm.      Pulses:           Dorsalis pedis pulses are detected w/ Doppler on the right side and detected w/ Doppler on the left side.        Posterior  tibial pulses are detected w/ Doppler on the right side and detected w/ Doppler on the left side.      Heart sounds: Normal heart sounds.      Comments: Monophasic DP/PT signals  Pulmonary:      Effort: Pulmonary effort is normal. No respiratory distress.      Breath sounds: Normal breath sounds.   Abdominal:      General: There is no distension.      Palpations: Abdomen is soft.      Tenderness: There is no abdominal tenderness.   Musculoskeletal:         General: No swelling.      Cervical back: Neck supple.   Skin:     General: Skin is warm and dry.      Capillary Refill: Capillary refill takes less than 2 seconds.      Findings: Wound present.      Comments: R 5th met wound   Neurological:      General: No focal deficit present.      Mental Status: He is alert and oriented to person, place, and time. Mental status is at baseline.   Psychiatric:         Mood and Affect: Mood normal.         Speech: Speech normal.         Behavior: Behavior normal. Behavior is cooperative.         Thought Content: Thought content normal.         Judgment: Judgment normal.       Wound/Incision:  R 5th metatarsal wound w/ dressing clean, dry and intact,      Lab Results: I have reviewed the following results:  CBC with diff:   Lab Results   Component Value Date    WBC 7.93 05/13/2025    HGB 10.8 (L) 05/13/2025    HCT 32.7 (L) 05/13/2025    MCV 88 05/13/2025     05/13/2025    RBC 3.73 (L) 05/13/2025    MCH 29.0 05/13/2025    MCHC 33.0 05/13/2025    RDW 13.8 05/13/2025    MPV 9.5 05/13/2025    NRBC 0 05/07/2025     BMP/CMP:  Lab Results   Component Value Date    SODIUM 133 (L) 05/13/2025    K 4.2 05/13/2025    K 4.5 04/14/2015     05/13/2025     04/14/2015    CO2 26 05/13/2025    CO2 26.9 04/14/2015    ANIONGAP 8 04/14/2015    BUN 13 05/13/2025    BUN 10 04/14/2015    CREATININE 0.93 05/13/2025    CREATININE 0.86 04/14/2015    GLUCOSE 179 (H) 04/14/2015    CALCIUM 8.7 05/13/2025    CALCIUM 8.9 04/14/2015    AST 46  (H) 05/13/2025    AST 12 08/12/2014    ALT 22 05/13/2025    ALT 19 08/12/2014    ALKPHOS 62 05/13/2025    ALKPHOS 101 08/12/2014    PROT 6.8 08/12/2014    BILITOT 0.6 08/12/2014    EGFR 77 05/13/2025     Coags:   Lab Results   Component Value Date    PTT 28 05/13/2025    INR 1.14 05/07/2025     Blood Culture:   Lab Results   Component Value Date    BLOODCX No Growth After 5 Days. 05/07/2025    BLOODCX No Growth After 5 Days. 05/07/2025     Urinalysis:   Lab Results   Component Value Date    COLORU Yellow 05/07/2025    COLORU Yellow 08/12/2014    CLARITYU Clear 05/07/2025    CLARITYU Clear 08/12/2014    SPECGRAV 1.010 05/07/2025    SPECGRAV 1.025 08/12/2014    PHUR 6.0 05/07/2025    PHUR 6.0 06/14/2016    PHUR 6.0 08/12/2014    LEUKOCYTESUR Negative 05/07/2025    LEUKOCYTESUR Negative 08/12/2014    NITRITE Negative 05/07/2025    NITRITE Negative 08/12/2014    PROTEINUA Negative 08/12/2014    GLUCOSEU Negative 05/07/2025    GLUCOSEU 100 (1/10%) (A) 08/12/2014    KETONESU Negative 05/07/2025    KETONESU Negative 08/12/2014    BILIRUBINUR Negative 05/07/2025    BILIRUBINUR Negative 08/12/2014    BLOODU 1+ (A) 05/07/2025    BLOODU Trace (A) 08/12/2014

## 2025-05-13 NOTE — ASSESSMENT & PLAN NOTE
Continue atenolol.  Anticoagulation: Apixaban on hold for anticipated vascular procedure  Heparin infusion held this morning

## 2025-05-13 NOTE — ASSESSMENT & PLAN NOTE
Transferred here for vascular surgery intervention  Vascular surgery planning femoral endarterectomy/bypass today

## 2025-05-13 NOTE — ANESTHESIA PROCEDURE NOTES
Arterial Line Insertion    Performed by: Annabelle Jim CRNA  Authorized by: Zach Marinelli DO  Consent: Written consent obtained.  Risks and benefits: risks, benefits and alternatives were discussed  Consent given by: patient  Patient understanding: patient states understanding of the procedure being performed  Patient consent: the patient's understanding of the procedure matches consent given  Procedure consent: procedure consent matches procedure scheduled  Relevant documents: relevant documents present and verified  Test results: test results available and properly labeled  Site marked: the operative site was marked  Preparation: Patient was prepped and draped in the usual sterile fashion.  Indications: hemodynamic monitoring  Orientation:  Left  Location: radial artery  Procedure Details:      Alex's test normal: yes  Needle gauge: 20  Placement technique:  Anatomical landmarks  Number of attempts: 2    Post-procedure:  Post-procedure: dressing applied  Waveform: good waveform and waveform confirmed  Patient tolerance: Patient tolerated the procedure well with no immediate complications

## 2025-05-13 NOTE — OP NOTE
OPERATIVE REPORT  PATIENT NAME: Gold Van    :  1945  MRN: [id number]  Pt Location: [facility] OR ROOM 06    SURGERY DATE: 2025    Surgeons and Role:     * Richard Aiken MD - Primary     * Elie Narayan MD - Assisting     * Karen Salter    Preop Diagnosis:  Peripheral arterial disease (HCC) [I73.9]    Post-Op Diagnosis Codes:     * Peripheral arterial disease (HCC) [I73.9]    Procedure(s):  -Extended right common femoral endarterectomy onto the proximal SFA with bovine pericardial patch   -Right lower extremity angiogram   -Third order cannulation of the right anterior tibial artery   -Balloon angioplasty of the right anterior tibial with a 3x220 [place] balloon   -DCB angioplasty of the SFA with a 6x150 [place] balloon   -Stenting of the right SFA with two 6x150 [place] stents. 6x5cm Viabahn. and a 7x5cm viabahn   -Post-dilation of stents with a 6mm and 7mm balloon   -Application of Prevena negative pressure wound vac    Specimen(s):  * No specimens in log *    Estimated Blood Loss:   350 mL    Drains:  Urethral Catheter Latex 16 Fr. (Active)   Number of days: 0       Anesthesia Type:   General    Operative Indications:  Peripheral arterial disease (HCC) [I73.9]  80 yo M with non-healing right lateral foot wound complicated by infection with cellulitis. Patient requires podiatric intervention but prior will need revascularization to optimize perfusion. CTA performed and reviewed. Plan for Right common femoral endarterectomy and antegrade endovascular intervention possible bypass    Operative Findings:  -Right common femoral endarterectomy with extension onto the proximal SFA with 14cm bovine pericardial patch angioplasty  -Right SFA stenting with two 6x150 Grazyna stents distally, a 6x5cm Viabahn proximally to cover thin walled area of bleeding, and 7x5cm Viabahn into the patched area (post dilated stents with 6mm balloon distally and 7mm proximally)  -Right AT angioplasty with 3mm  balloon    -right DP stenosis able to be crossed with wire but unable to cross with catheters or balloon. Unsuccessful attempt at DP angioplasty with 2mm balloon    Other than the DP stenosis there is inline flow to the foot      Complications:   None    Procedure and Technique:  After informed consent was obtained, the patient was brought to the operating room and placed in the supine position. Perioperative IV antibiotics were given.  He was given anesthesia and endotracheally intubated.  He was then prepped and draped in the usual sterile fashion exposing the right leg and bilateral groins.  A timeout was performed.     A longitudinal incision was made over top of the right groin with a 15 blade.  Dissection was then done with electrocautery until we are able to identify the external oblique aponeurosis and inguinal ligament.  Upon were able to palpate the common femoral artery.  We continued dissection down and expose the common femoral artery.  We dissected out the common femoral artery circumferentially and underneath the inguinal ligament so we can have a soft area to clamp for proximal control.  We continued dissection down the SFA until we again had a soft area which we can use for our distal control.  We then dissected out the profunda femoris for few centimeters to a point where we had again a soft area for distal control.  Collateral branches were looped with Vesseloops as well as the profunda femoris and SFA.  Patient at this point was then heparinized with 7000 heparin and redosed as needed throughout the case.  We clamped the common femoral artery and pulled up on the Vesseloops.  We then used an 11 blade to create an arteriotomy in using Terry scissors open the artery from the common femoral artery onto the SFA for total length of 14 cm.  We performed the endarterectomy.  The artery was then patched using a 14 cm bovine pericardial patch.  This was sewn in with two 5-0 Prolene's anchored at the toe  and heel and sewn towards the center.  Prior to completion of the anastomosis the vein was backbled and forward bled.  The Astl stenting was completed and the clamps and Vesseloops were removed.  There was an area of thin wall that was bleeding on the SFA just distal to our patch which I attempted to repair with Prolene and pledgets however there is continued to bleed due to the thin-walled nature.  My decision was then to cover this with a covered stent during the endovascular portion of the procedure.  So we antegrade access to the common femoral artery patch using a micropuncture needle followed by the micropuncture wire and then the micropuncture sheath.  A Charitybuzz wire was then advanced and a 5 [place] sheath was advanced into the common femoral artery.  An angiogram was then performed showing significant SFA disease as well as tibioperoneal disease.  There was a patent peroneal artery and the AT had a segmental occlusion as well as a severe stenosis of the dorsalis pedis.  A Glidewire was then used with a glide catheter to traverse the SFA which were able to be successfully.  We then advanced our wire and catheter into the popliteal segment.  At this point I exchanged the wire for a V18 was able to cannulate the anterior tibial artery and advanced our wire past the occlusion into the distal AT.  A 3 mm balloon was then advanced into the AT and balloon angioplastied the segmental occlusion.  We then use that same 3 mm balloon throughout the SFA to create a channel.  We then exchanged our 3 mm balloon for a 5 mm Fernando balloon and balloon angioplasty of the SFA.  We then further angioplastied with a 6 mm [place] DCB leaving insufflated for 3 minutes per IFU.  The SFA was then stented with two 6 x 150 Grazyna stents distally and at the proximal segment just before the patch a 6 x 5 cm Viabahn stent was deployed however this was slightly too short so a 7 x 5 cm Viabahn was used to extend this further into the  patched area.  After deployment of the covered stent the bleeding in the thin-walled SFA area had stopped.  The stents were postdilated with a 6 mm balloon and the proximal segment was then dilated with a 7 mm balloon.  An angiogram performed that showed good flow through the SFA peroneal and AT however there is still a significant stenosis in the dorsalis pedis.  Decision was made then to attempt angioplasty of the DP.  Using a V14 wire and a 014 catheter we were able to traverse the stenosis with a wire but was unable to pass the catheter.  I attempted to try and pass a 2 mm balloon however this was unsuccessful and I was unable to treat the dorsalis pedis stenosis.  Since we had significantly improved blood flow to the right foot no further intervention was done then at this point.  Technically there is inline flow to the foot despite the stenotic area dorsalis pedis.  Wires were removed and a 5 Prolene was then used to close the patchotomy.  Hemostasis was achieved within the groin using Surgiflo and Surgicel.  Protamine was given.  The groin was irrigated with antibiotic irrigation and then closed in multiple layers with a 2-0 Vicryl and 3-0 Vicryl followed by staples for the skin and a Prevena VAC was placed over top of the incision.  Patient was extubated taken the PACU for recovery.  Patient had significant improvement in his DP and PT signals postop.  No palpable pulses         I was present for the entire procedure.    Patient Disposition:  PACU              SIGNATURE: Richard Aiken MD  DATE: May 13, 2025  TIME: 6:13 PM        Vascular Quality Initiative - Peripheral Vascular Intervention     Urgency: Urgent    Functional Status:  Ambulatory and capable of all self care but unable to carry out any work activities. Up and about more than 50% of waking hours.   Ambulation: Amb = independently ambulatory    Leg Symptoms    Right: Ulcer/necrosis (gangrene): de anthony tissue loss due to peripheral  arterial disease, not due to non-healing prior amputation       Tissue Loss Severity: Grade 2, Deep = deeper full thickness ulcer or necrosis (gangrene) on distal leg or foot with exposed bone, joint, or tendon, or shallow heel ulcer without involvement of the calcaneus (ie, major tissue loss: salvageable with 3 digital amputations or standard transmetatarsal amputation [TMA] plus skin coverage).     Infection: Grade 1, Mild = Infection is present and at least two of the following are present: local swelling or induration, erythema >0.5 to 2 cm around ulcer, local tenderness or pain, local warmth, or purulent discharge. Other causes of an inflammatory response of the skin have been excluded (eg, gout, fracture).      COVID Information  COVID Symptoms Pre-Procedure: Asymptomatic    Treatment Delayed by Pandemic: None    Access   Number of Sites: 1     Access Site 1:     Side 1: Right    Site 1: Femoral Antegrade    Access Guidance 1:Open Exposure    Largest Sheath Size 1: 6 Fr.    Closure Device 1: None   None    Procedure  Fluoro Time: 41.1 minutes  Contrast Volume: Visipaque 90 ml  DAP: 76.7 Gy.cm2  CO2: no  Anticoagulant: Heparin  Protamine: Yes  If Creatinine is > 1.2 or missing, MAURICE Prophylaxis none     Treatment Details  Indication: Occlusive Disease,    Completion Assessment  Artery 1 treated: SFA        Right               Outflow: AT,PT,Peroneal: 2                  Was this Site previously treated?: No          TASC Grade: C          Total Treated Length: 25 cm          Total Occluded Length: 0 cm          Calcification: Severe (calcification on both sides of artery > half length of lesion)          Number of Treatment types (Devices):   3           Device 1          Treatment Type: Special Balloon,  Drug Coated Balloon                Diameter: 6 mm          Length: 150 mm         Device 2          Treatment Type: Stent,  Drug Eluting Stent                Diameter: 6 mm          Length: 150 mm               Device 3          Treatment Type: Stent Graft                Diameter: 6 mm          Length: 50 mm          Concomitant: None          Technical result: Successful (stenosis <=30%)      Artery 2 treated: Ant Tibial   Right                    Was this Site previously treated?: No          TASC Grade: A          Total Treated Length: 8 cm           Total Occluded Length: 4 cm          Calcification: Moderate (calcification on both sides of artery < half length of lesion)          Number of Treatment types (Devices):   1           Device 1          Treatment Type: Plain Balloon          Concomitant: None          Technical result: Successful (stenosis <=30%)       None     Post Procedure  Patient currently taking: Statin, Yes      Antiplatelet Medication, Yes    Procedure Complications: No

## 2025-05-13 NOTE — PLAN OF CARE
Problem: PAIN - ADULT  Goal: Verbalizes/displays adequate comfort level or baseline comfort level  Description: Interventions:- Encourage patient to monitor pain and request assistance- Assess pain using appropriate pain scale- Administer analgesics based on type and severity of pain and evaluate response- Implement non-pharmacological measures as appropriate and evaluate response- Consider cultural and social influences on pain and pain management- Notify physician/advanced practitioner if interventions unsuccessful or patient reports new pain  Outcome: Progressing     Problem: DISCHARGE PLANNING  Goal: Discharge to home or other facility with appropriate resources  Description: INTERVENTIONS:- Identify barriers to discharge w/patient and caregiver- Arrange for needed discharge resources and transportation as appropriate- Identify discharge learning needs (meds, wound care, etc.)- Arrange for interpretive services to assist at discharge as needed- Refer to Case Management Department for coordinating discharge planning if the patient needs post-hospital services based on physician/advanced practitioner order or complex needs related to functional status, cognitive ability, or social support system  Outcome: Progressing     Problem: SKIN/TISSUE INTEGRITY - ADULT  Goal: Skin Integrity remains intact(Skin Breakdown Prevention)  Description: Assess:-Perform Sae assessment -Clean and moisturize skin -Inspect skin when repositioning, toileting, and assisting with ADLS-Assess under medical devices -Assess extremities for adequate circulation and sensation Bed Management:-Have minimal linens on bed & keep smooth, unwrinkled-Change linens as needed when moist or perspiring-Avoid sitting or lying in one position for more than 2 hours while in bed-Keep HOB at 30 degrees Toileting:-Offer bedside commode-Assess for incontinence -Use incontinent care products after each incontinent episode Activity:-Mobilize patient 3 times  a day-Encourage activity and walks on unit-Encourage or provide ROM exercises -Turn and reposition patient every 2 Hours-Use appropriate equipment to lift or move patient in bed-Instruct/ Assist with weight shifting  when out of bed in chair-Consider limitation of chair time 2 hour intervalsSkin Care:-Avoid use of baby powder, tape, friction and shearing, hot water or constrictive clothing-Relieve pressure over bony prominences Do not massage red bony areasNext Steps:-Teach patient strategies to minimize risks Consider consults to  interdisciplinary teams   Outcome: Progressing  Goal: Incision(s), wounds(s) or drain site(s) healing without S/S of infection  Description: INTERVENTIONS- Assess and document dressing, incision, wound bed, drain sites and surrounding tissue- Provide patient and family education- Perform skin care/dressing changes every shift  Outcome: Progressing  Goal: Pressure injury heals and does not worsen  Description: Interventions:- Implement low air loss mattress or specialty surface (Criteria met)- Apply silicone foam dressing- Instruct/assist with weight shifting ever 120 minutes when in chair - Limit chair time to 4 hour intervals- hours - Utilize friction reducing device or surface for transfers - Consider nutrition services referral as needed  Outcome: Progressing

## 2025-05-13 NOTE — ASSESSMENT & PLAN NOTE
80 yo male ex-smoker w/ a hx of DM II (A1c 9.3), HTN, HLD, asthma, CVA (9/2024), PAF (eliquis), cardiomyopathy and PAD presented to [facility] on 5/7/25 w/ R foot pain x1 week and non-healing R 5th met wound x4-5 weeks. Pt admitted and started on ABX. Pt seen by podiatry w/ plans for R partial 5th ray resection pending revascularization. Pt was transferred to [facility] on 5/11/25 for vascular surgical intervention.     Vascular surgery consulted for worsening R 5th met wound in the setting of PAD. CTA abd shows L CFA/SFA stenosis w/ focal occlusions and AT/peroneal occlusions w/ reconstitution. LEAD shows R PTA occlusion and R RIC: 0.42/15/16.    Diagnostics:  -5/7/25 LE vein mapping: Right: GSV patent from groin to ankle, diameter 2.2mm to 12.6mm. Left: GSV is patent from groin to ankle, diameter 2.1mm to 9mm.   -5/7/25 LE venous duplex: (-) DVT  -5/6/25 CTA abd w/ runoff: B/L EDY/EIA/IIA patent w/ atherosclerosis. Right: Focal high-grade stenosis of distal R CFA and diffuse atherosclerotic disease of R SFA resulting in moderate to severe stenoses with focal near complete occlusions at the proximal and distal segments. Short segment occlusions of proximal R AT and peroneal arteries with reconstitution. R PTA is occluded. Left: Occluded L SFA with reconstitution. L YUNG and PTA occluded. One-vessel runoff LLE through peroneal artery.  -4/18/25 LEAD: Right: >75% prox SFA and 50-75% dSFA stenosis. Distal PTA occlusion. R RIC: 0.42/15/16. Left: PTA and YUNG occlusions. L RIC: 1.06/85/45.   -4/17/25 MRI R foot: Possible early OM in R 5th met    Plan:  -R 5th met wound (+) OM in the setting of PAD  -CTA shows L CFA/SFA stenosis w/ focal occlusions and AT/peroneal occlusions w/ reconstitution  -LEAD shows R PTA occlusion and R RIC: 0.42/15/16  -Plan R femoral endarterectomy w/ antegrade arteriogram and endovascular intervention vs possible bypass; scheduled for today (5/13)  -Please keep pt NPO today for OR  -Pt seen by cardiology  for surgical risk stratification; input appreciated. Per cardiology, patient is at appropriate risk to proceed with surgery   -Continue ASA and lipitor for medical management of PAD  -Continue ABX for R foot wound per primary team   -Continue to hold eliquis until all vascular and podiatric procedures are completed; hold heparin gtt today for OR  -Podiatry following w/ plans for R partial 5th ray resection pending revascularization; input appreciated  -Endo consult placed for assistance w/ DM control  -Continue medical management per primary team  -Will discuss with Dr. Aiken

## 2025-05-13 NOTE — ASSESSMENT & PLAN NOTE
Lab Results   Component Value Date    HGBA1C 9.3 (H) 04/09/2025     Recent Labs     05/12/25  1113 05/12/25  1557 05/12/25 2047 05/13/25  0738   POCGLU 207* 329* 274* 188*     Prior to admission glipizide linagliptin and metformin  Started on glargine 5 units with lispro 5 units 3 times daily during hospitalization  A1c 9.3.  Endocrinology consulted by vascular surgery

## 2025-05-13 NOTE — PROGRESS NOTES
Progress Note - Hospitalist   Name: Gold Van 79 y.o. male I MRN: [id number]  Unit/Bed#: E5 -01 I Date of Admission: 5/11/2025   Date of Service: 5/13/2025 I Hospital Day: 2    Assessment & Plan  Sepsis without acute organ dysfunction ([facility])  History of diabetes mellitus paroxysmal atrial fibrillation asthma cardiomyopathy and PAD presented to [facility] with right foot wound  Met sepsis criteria at [facility]  Currently on cefazolin/metronidazole  Blood cultures no growth 5 days  Transferred here for vascular surgery.  Podiatry planning on subsequent right partial fifth ray resection  Severe peripheral arterial disease (HCC)  Transferred here for vascular surgery intervention  Vascular surgery planning femoral endarterectomy/bypass today  Type 2 diabetes mellitus with complication, without long-term current use of insulin ([facility])  Lab Results   Component Value Date    HGBA1C 9.3 (H) 04/09/2025     Recent Labs     05/12/25  1113 05/12/25  1557 05/12/25  2047 05/13/25  0738   POCGLU 207* 329* 274* 188*     Prior to admission glipizide linagliptin and metformin  Started on glargine 5 units with lispro 5 units 3 times daily during hospitalization  A1c 9.3.  Endocrinology consulted by vascular surgery  PAF (paroxysmal atrial fibrillation) ([facility])  Continue atenolol.  Anticoagulation: Apixaban on hold for anticipated vascular procedure  Heparin infusion held this morning  Essential hypertension  Continue amlodipine atenolol and lisinopril  Mild intermittent asthma without complication  Prior to admission on [facility].  No exacerbation  Pruritus  Chronic pruritus follows with dermatology as an outpatient on prednisone 5 mg daily  Cardiomyopathy (HCC)  Last known LVEF 50% echocardiogram 2024  Currently compensated    VTE Pharmacologic Prophylaxis: VTE Score: 5 High Risk (Score >/= 5) - Pharmacological DVT Prophylaxis Contraindicated. Sequential Compression Devices Ordered.    Mobility:   Basic Mobility Inpatient  Raw Score: 21  JH-HLM Goal: 6: Walk 10 steps or more  JH-HLM Achieved: 7: Walk 25 feet or more  JH-HLM Goal achieved. Continue to encourage appropriate mobility.    Patient Centered Rounds: I have performed bedside rounds with nursing staff today.  Discussions with Specialists or Other Care Team Provider: Case management    Education and Discussions with Family / Patient:     Current Length of Stay: 2 day(s)  Current Patient Status: Inpatient   Certification Statement: The patient will continue to require additional inpatient hospital stay due to vascular procedure today  Discharge Plan: Anticipate discharge in >72 hrs to home.    Code Status: Level 1 - Full Code    Subjective   Patient seen and examined.  No chest pain or shortness of breath.  Feels well.    Objective   Vitals:   Temp (24hrs), Av.4 °F (36.9 °C), Min:98.1 °F (36.7 °C), Max:98.7 °F (37.1 °C)    Temp:  [98.1 °F (36.7 °C)-98.7 °F (37.1 °C)] 98.7 °F (37.1 °C)  HR:  [74-83] 74  Resp:  [16-19] 18  BP: (127-143)/(57-70) 132/63  SpO2:  [90 %-96 %] 93 %  Body mass index is 21.97 kg/m².     Input and Output Summary (last 24 hours):     Intake/Output Summary (Last 24 hours) at 2025 0936  Last data filed at 2025 0838  Gross per 24 hour   Intake 540 ml   Output 1600 ml   Net -1060 ml       Physical Exam  Vitals reviewed.   Constitutional:       General: He is not in acute distress.  HENT:      Head: Normocephalic and atraumatic.   Eyes:      General: No scleral icterus.  Cardiovascular:      Rate and Rhythm: Regular rhythm.      Heart sounds:      No gallop.   Pulmonary:      Effort: Pulmonary effort is normal. No respiratory distress.   Abdominal:      General: Bowel sounds are normal.      Palpations: Abdomen is soft.      Tenderness: There is no abdominal tenderness. There is no guarding.   Musculoskeletal:         General: No signs of injury.   Skin:     General: Skin is warm.   Neurological:      General: No focal deficit present.      Mental  Status: He is alert.      Motor: No weakness.   Psychiatric:         Mood and Affect: Mood normal.       Lines/Drains:  Invasive Devices       Peripheral Intravenous Line  Duration             Peripheral IV 05/11/25 Left Antecubital 1 day                            Lab Results: I have reviewed the following results:   Results from last 7 days   Lab Units 05/13/25 0455 05/11/25  0549 05/10/25  0608 05/08/25  0425 05/07/25  0900   WBC Thousand/uL 7.93 8.24 8.55   < > 12.54*   HEMOGLOBIN g/dL 10.8* 10.2* 9.7*   < > 11.5*   PLATELETS Thousands/uL 256 254 245   < > 310   MCV fL 88 90 90   < > 92   INR   --   --   --   --  1.14    < > = values in this interval not displayed.     Results from last 7 days   Lab Units 05/13/25 0455 05/11/25  0549 05/10/25  0608 05/08/25  0425 05/07/25  0900   SODIUM mmol/L 133* 133* 133*   < > 137   POTASSIUM mmol/L 4.2 4.2 4.0   < > 3.9   CHLORIDE mmol/L 101 101 101   < > 102   CO2 mmol/L 26 25 26   < > 25   ANION GAP mmol/L 6 7 6   < > 10   BUN mg/dL 13 15 16   < > 17   CREATININE mg/dL 0.93 0.94 0.93   < > 0.95   CALCIUM mg/dL 8.7 8.7 8.6   < > 9.4   ALBUMIN g/dL 3.1*  --   --   --  3.7   TOTAL BILIRUBIN mg/dL 0.33  --   --   --  0.31   ALK PHOS U/L 62  --   --   --  73   ALT U/L 22  --   --   --  10   AST U/L 46*  --   --   --  9*   EGFR ml/min/1.73sq m 77 76 77   < > 75   GLUCOSE RANDOM mg/dL 206* 212* 199*   < > 155*    < > = values in this interval not displayed.                      Results from last 7 days   Lab Units 05/08/25 0425 05/07/25  1242 05/07/25  0900   LACTIC ACID mmol/L  --  1.5 2.4*   PROCALCITONIN ng/ml 0.11  --  0.08     Results from last 7 days   Lab Units 05/13/25  0738 05/12/25  2047 05/12/25  1557 05/12/25  1113 05/12/25  0710 05/11/25  2137 05/11/25  1609 05/11/25  1152 05/11/25  0745 05/10/25  2039 05/10/25  1704 05/10/25  1058   POC GLUCOSE mg/dl 188* 274* 329* 207* 162* 245* 365* 231* 194* 212* 423* 254*               Recent Cultures (last 7 days):    Results from last 7 days   Lab Units 05/07/25  0900   BLOOD CULTURE  No Growth After 5 Days.  No Growth After 5 Days.       Imaging:  Reviewed radiology reports from this admission including:  CTA abdominal w run off w wo contrast  Result Date: 5/7/2025  Impression: 1. Focal high-grade stenosis of distal right CFA and diffuse atherosclerotic disease of right SFA resulting in moderate to severe stenoses with focal near complete occlusions at the proximal and distal segments. 2. Short segment occlusions of proximal right anterior tibial and peroneal arteries with reconstitution in the medial to distal segments. Right posterior tibial artery is occluded. 3. Occluded left SFA with reconstitution in the distal segment. Left anterior tibial and posterior tibial arteries are occluded. One-vessel runoff of the left lower extremity through the peroneal artery. 4. Focal high-grade stenosis at the proximal right renal artery. Workstation performed: [id number]       Last 24 Hours Medication List:     Current Facility-Administered Medications:     acetaminophen (TYLENOL) tablet 975 mg, Q8H SANDRA    albuterol (PROVENTIL HFA,VENTOLIN HFA) inhaler 1 puff, Q4H PRN    amLODIPine (NORVASC) tablet 5 mg, BID    aspirin chewable tablet 81 mg, Daily    atenolol (TENORMIN) tablet 25 mg, Daily    atorvastatin (LIPITOR) tablet 40 mg, QPM    Budeson-Glycopyrrol-Formoterol 160-9-4.8 MCG/ACT AERO 2 puff, BID    ceFAZolin (ANCEF) IVPB (premix in dextrose) 2,000 mg 50 mL, Q8H, Last Rate: 2,000 mg (05/13/25 0621)    chlorhexidine (PERIDEX) 0.12 % oral rinse 15 mL, On Call To OR    famotidine (PEPCID) tablet 20 mg, BID    heparin (porcine) injection 2,800 Units, Q6H PRN    heparin (porcine) injection 5,600 Units, Q6H PRN    insulin glargine (LANTUS) subcutaneous injection 5 Units 0.05 mL, HS    insulin lispro (HumALOG/ADMELOG) 100 units/mL subcutaneous injection 1-5 Units, TID AC **AND** Fingerstick Glucose (POCT), TID AC    insulin lispro  (HumALOG/ADMELOG) 100 units/mL subcutaneous injection 1-5 Units, HS    insulin lispro (HumALOG/ADMELOG) 100 units/mL subcutaneous injection 5 Units, TID With Meals    lisinopril (ZESTRIL) tablet 20 mg, BID    LORazepam (ATIVAN) tablet 0.5 mg, Q8H PRN    metroNIDAZOLE (FLAGYL) IVPB (premix) 500 mg 100 mL, Q12H, Last Rate: 500 mg (05/13/25 0512)    montelukast (SINGULAIR) tablet 10 mg, HS    morphine injection 2 mg, Q4H PRN    multivitamin-minerals (CENTRUM) tablet 1 tablet, Daily    oxyCODONE (ROXICODONE) IR tablet 5 mg, Q4H PRN **OR** oxyCODONE (ROXICODONE) immediate release tablet 10 mg, Q4H PRN    predniSONE tablet 5 mg, Daily    Administrative Statements   Today, Patient Was Seen By: Bean Sweeney, DO  I have spent a total time of 35 minutes in caring for this patient on the day of the visit/encounter including Diagnostic results, Documenting in the medical record, Reviewing/placing orders in the medical record (including tests, medications, and/or procedures), and Communicating with other healthcare professionals .    **Please Note: This note may have been constructed using a voice recognition system.**

## 2025-05-14 ENCOUNTER — DOCUMENTATION (OUTPATIENT)
Dept: VASCULAR SURGERY | Facility: CLINIC | Age: 80
End: 2025-05-14

## 2025-05-14 ENCOUNTER — ANESTHESIA EVENT (INPATIENT)
Dept: PERIOP | Facility: HOSPITAL | Age: 80
End: 2025-05-14
Payer: COMMERCIAL

## 2025-05-14 PROBLEM — I27.20 PULMONARY HYPERTENSION (HCC): Status: ACTIVE | Noted: 2025-05-14

## 2025-05-14 LAB
ALBUMIN SERPL BCG-MCNC: 2.9 G/DL (ref 3.5–5)
ALP SERPL-CCNC: 52 U/L (ref 34–104)
ALT SERPL W P-5'-P-CCNC: 15 U/L (ref 7–52)
ANION GAP SERPL CALCULATED.3IONS-SCNC: 7 MMOL/L (ref 4–13)
APTT PPP: 30 SECONDS (ref 23–34)
APTT PPP: 37 SECONDS (ref 23–34)
APTT PPP: 45 SECONDS (ref 23–34)
APTT PPP: 46 SECONDS (ref 23–34)
AST SERPL W P-5'-P-CCNC: 31 U/L (ref 13–39)
BILIRUB SERPL-MCNC: 0.38 MG/DL (ref 0.2–1)
BUN SERPL-MCNC: 14 MG/DL (ref 5–25)
CALCIUM ALBUM COR SERPL-MCNC: 9 MG/DL (ref 8.3–10.1)
CALCIUM SERPL-MCNC: 8.1 MG/DL (ref 8.4–10.2)
CHLORIDE SERPL-SCNC: 104 MMOL/L (ref 96–108)
CO2 SERPL-SCNC: 23 MMOL/L (ref 21–32)
CREAT SERPL-MCNC: 1.13 MG/DL (ref 0.6–1.3)
ERYTHROCYTE [DISTWIDTH] IN BLOOD BY AUTOMATED COUNT: 14.1 % (ref 11.6–15.1)
GFR SERPL CREATININE-BSD FRML MDRD: 61 ML/MIN/1.73SQ M
GLUCOSE SERPL-MCNC: 210 MG/DL (ref 65–140)
GLUCOSE SERPL-MCNC: 220 MG/DL (ref 65–140)
GLUCOSE SERPL-MCNC: 242 MG/DL (ref 65–140)
GLUCOSE SERPL-MCNC: 285 MG/DL (ref 65–140)
GLUCOSE SERPL-MCNC: 324 MG/DL (ref 65–140)
HCT VFR BLD AUTO: 25.6 % (ref 36.5–49.3)
HGB BLD-MCNC: 8.4 G/DL (ref 12–17)
MAGNESIUM SERPL-MCNC: 2.1 MG/DL (ref 1.9–2.7)
MCH RBC QN AUTO: 29.9 PG (ref 26.8–34.3)
MCHC RBC AUTO-ENTMCNC: 32.8 G/DL (ref 31.4–37.4)
MCV RBC AUTO: 91 FL (ref 82–98)
PHOSPHATE SERPL-MCNC: 4 MG/DL (ref 2.3–4.1)
PLATELET # BLD AUTO: 222 THOUSANDS/UL (ref 149–390)
PMV BLD AUTO: 9.4 FL (ref 8.9–12.7)
POTASSIUM SERPL-SCNC: 4.6 MMOL/L (ref 3.5–5.3)
PROT SERPL-MCNC: 5.6 G/DL (ref 6.4–8.4)
RBC # BLD AUTO: 2.81 MILLION/UL (ref 3.88–5.62)
SODIUM SERPL-SCNC: 134 MMOL/L (ref 135–147)
WBC # BLD AUTO: 11.03 THOUSAND/UL (ref 4.31–10.16)

## 2025-05-14 PROCEDURE — 82948 REAGENT STRIP/BLOOD GLUCOSE: CPT

## 2025-05-14 PROCEDURE — 85027 COMPLETE CBC AUTOMATED: CPT | Performed by: SURGERY

## 2025-05-14 PROCEDURE — 85730 THROMBOPLASTIN TIME PARTIAL: CPT | Performed by: INTERNAL MEDICINE

## 2025-05-14 PROCEDURE — 85730 THROMBOPLASTIN TIME PARTIAL: CPT | Performed by: SURGERY

## 2025-05-14 PROCEDURE — 99232 SBSQ HOSP IP/OBS MODERATE 35: CPT | Performed by: INTERNAL MEDICINE

## 2025-05-14 PROCEDURE — NC001 PR NO CHARGE: Performed by: PODIATRIST

## 2025-05-14 PROCEDURE — 83735 ASSAY OF MAGNESIUM: CPT

## 2025-05-14 PROCEDURE — 84100 ASSAY OF PHOSPHORUS: CPT

## 2025-05-14 PROCEDURE — 85730 THROMBOPLASTIN TIME PARTIAL: CPT

## 2025-05-14 PROCEDURE — 99024 POSTOP FOLLOW-UP VISIT: CPT | Performed by: SURGERY

## 2025-05-14 PROCEDURE — 85730 THROMBOPLASTIN TIME PARTIAL: CPT | Performed by: NURSE PRACTITIONER

## 2025-05-14 PROCEDURE — 99447 NTRPROF PH1/NTRNET/EHR 11-20: CPT | Performed by: INTERNAL MEDICINE

## 2025-05-14 PROCEDURE — 80053 COMPREHEN METABOLIC PANEL: CPT | Performed by: SURGERY

## 2025-05-14 RX ORDER — LISINOPRIL 20 MG/1
20 TABLET ORAL 2 TIMES DAILY
Status: DISCONTINUED | OUTPATIENT
Start: 2025-05-14 | End: 2025-05-25

## 2025-05-14 RX ORDER — HEPARIN SODIUM 1000 [USP'U]/ML
2000 INJECTION, SOLUTION INTRAVENOUS; SUBCUTANEOUS EVERY 6 HOURS PRN
Status: DISCONTINUED | OUTPATIENT
Start: 2025-05-14 | End: 2025-05-16

## 2025-05-14 RX ORDER — HEPARIN SODIUM 10000 [USP'U]/100ML
3-20 INJECTION, SOLUTION INTRAVENOUS
Status: DISCONTINUED | OUTPATIENT
Start: 2025-05-14 | End: 2025-05-16

## 2025-05-14 RX ORDER — ATORVASTATIN CALCIUM 40 MG/1
40 TABLET, FILM COATED ORAL EVERY EVENING
Status: DISCONTINUED | OUTPATIENT
Start: 2025-05-14 | End: 2025-05-28 | Stop reason: HOSPADM

## 2025-05-14 RX ORDER — AMLODIPINE BESYLATE 5 MG/1
5 TABLET ORAL 2 TIMES DAILY
Status: DISCONTINUED | OUTPATIENT
Start: 2025-05-14 | End: 2025-05-14 | Stop reason: SDUPTHER

## 2025-05-14 RX ORDER — HEPARIN SODIUM 1000 [USP'U]/ML
4000 INJECTION, SOLUTION INTRAVENOUS; SUBCUTANEOUS EVERY 6 HOURS PRN
Status: DISCONTINUED | OUTPATIENT
Start: 2025-05-14 | End: 2025-05-16

## 2025-05-14 RX ORDER — METFORMIN HYDROCHLORIDE 500 MG/1
500 TABLET, EXTENDED RELEASE ORAL
Status: DISCONTINUED | OUTPATIENT
Start: 2025-05-14 | End: 2025-05-26

## 2025-05-14 RX ORDER — MONTELUKAST SODIUM 10 MG/1
10 TABLET ORAL
Status: DISCONTINUED | OUTPATIENT
Start: 2025-05-14 | End: 2025-05-16

## 2025-05-14 RX ORDER — INSULIN GLARGINE 100 [IU]/ML
10 INJECTION, SOLUTION SUBCUTANEOUS
Status: DISCONTINUED | OUTPATIENT
Start: 2025-05-14 | End: 2025-05-15

## 2025-05-14 RX ORDER — SODIUM CHLORIDE, SODIUM LACTATE, POTASSIUM CHLORIDE, CALCIUM CHLORIDE 600; 310; 30; 20 MG/100ML; MG/100ML; MG/100ML; MG/100ML
125 INJECTION, SOLUTION INTRAVENOUS CONTINUOUS
Status: DISCONTINUED | OUTPATIENT
Start: 2025-05-14 | End: 2025-05-15

## 2025-05-14 RX ORDER — LIDOCAINE 50 MG/G
1 PATCH TOPICAL DAILY
Status: DISCONTINUED | OUTPATIENT
Start: 2025-05-14 | End: 2025-05-28 | Stop reason: HOSPADM

## 2025-05-14 RX ORDER — ASPIRIN 81 MG/1
81 TABLET, CHEWABLE ORAL DAILY
Status: DISCONTINUED | OUTPATIENT
Start: 2025-05-14 | End: 2025-05-16

## 2025-05-14 RX ORDER — ATENOLOL 50 MG/1
25 TABLET ORAL DAILY
Status: DISCONTINUED | OUTPATIENT
Start: 2025-05-14 | End: 2025-05-21

## 2025-05-14 RX ADMIN — FAMOTIDINE 20 MG: 20 TABLET, FILM COATED ORAL at 17:04

## 2025-05-14 RX ADMIN — Medication 1 TABLET: at 08:33

## 2025-05-14 RX ADMIN — SODIUM CHLORIDE, SODIUM LACTATE, POTASSIUM CHLORIDE, AND CALCIUM CHLORIDE 125 ML/HR: .6; .31; .03; .02 INJECTION, SOLUTION INTRAVENOUS at 23:09

## 2025-05-14 RX ADMIN — INSULIN LISPRO 5 UNITS: 100 INJECTION, SOLUTION INTRAVENOUS; SUBCUTANEOUS at 08:33

## 2025-05-14 RX ADMIN — CEFAZOLIN SODIUM 2000 MG: 2 SOLUTION INTRAVENOUS at 15:17

## 2025-05-14 RX ADMIN — LIDOCAINE 1 PATCH: 50 PATCH CUTANEOUS at 23:00

## 2025-05-14 RX ADMIN — INSULIN LISPRO 3 UNITS: 100 INJECTION, SOLUTION INTRAVENOUS; SUBCUTANEOUS at 21:51

## 2025-05-14 RX ADMIN — HEPARIN SODIUM 2000 UNITS: 1000 INJECTION, SOLUTION INTRAVENOUS; SUBCUTANEOUS at 23:09

## 2025-05-14 RX ADMIN — CEFAZOLIN SODIUM 2000 MG: 2 SOLUTION INTRAVENOUS at 07:26

## 2025-05-14 RX ADMIN — INSULIN GLARGINE 10 UNITS: 100 INJECTION, SOLUTION SUBCUTANEOUS at 21:57

## 2025-05-14 RX ADMIN — CLOPIDOGREL BISULFATE 75 MG: 75 TABLET, FILM COATED ORAL at 08:33

## 2025-05-14 RX ADMIN — LISINOPRIL 20 MG: 20 TABLET ORAL at 12:11

## 2025-05-14 RX ADMIN — ATENOLOL 25 MG: 25 TABLET ORAL at 12:11

## 2025-05-14 RX ADMIN — INSULIN LISPRO 2 UNITS: 100 INJECTION, SOLUTION INTRAVENOUS; SUBCUTANEOUS at 11:31

## 2025-05-14 RX ADMIN — METFORMIN ER 500 MG 500 MG: 500 TABLET ORAL at 16:29

## 2025-05-14 RX ADMIN — HEPARIN SODIUM 20 UNITS/KG/HR: 10000 INJECTION, SOLUTION INTRAVENOUS at 22:59

## 2025-05-14 RX ADMIN — BUDESONIDE, GLYCOPYRROLATE, AND FORMOTEROL FUMARATE 2 PUFF: 160; 9; 4.8 AEROSOL, METERED RESPIRATORY (INHALATION) at 08:36

## 2025-05-14 RX ADMIN — CEFAZOLIN SODIUM 2000 MG: 2 SOLUTION INTRAVENOUS at 22:18

## 2025-05-14 RX ADMIN — HEPARIN SODIUM 12 UNITS/KG/HR: 10000 INJECTION, SOLUTION INTRAVENOUS at 02:20

## 2025-05-14 RX ADMIN — OXYCODONE HYDROCHLORIDE 10 MG: 10 TABLET ORAL at 13:31

## 2025-05-14 RX ADMIN — MONTELUKAST 10 MG: 10 TABLET, FILM COATED ORAL at 21:51

## 2025-05-14 RX ADMIN — ATORVASTATIN CALCIUM 40 MG: 40 TABLET, FILM COATED ORAL at 17:04

## 2025-05-14 RX ADMIN — OXYCODONE HYDROCHLORIDE 10 MG: 10 TABLET ORAL at 23:00

## 2025-05-14 RX ADMIN — ASPIRIN 81 MG: 81 TABLET, CHEWABLE ORAL at 12:11

## 2025-05-14 RX ADMIN — ACETAMINOPHEN 975 MG: 325 TABLET, FILM COATED ORAL at 06:11

## 2025-05-14 RX ADMIN — METRONIDAZOLE 500 MG: 500 INJECTION, SOLUTION INTRAVENOUS at 15:36

## 2025-05-14 RX ADMIN — OXYCODONE HYDROCHLORIDE 10 MG: 10 TABLET ORAL at 07:30

## 2025-05-14 RX ADMIN — BUDESONIDE, GLYCOPYRROLATE, AND FORMOTEROL FUMARATE 2 PUFF: 160; 9; 4.8 AEROSOL, METERED RESPIRATORY (INHALATION) at 21:57

## 2025-05-14 RX ADMIN — PREDNISONE 5 MG: 5 TABLET ORAL at 08:33

## 2025-05-14 RX ADMIN — INSULIN LISPRO 3 UNITS: 100 INJECTION, SOLUTION INTRAVENOUS; SUBCUTANEOUS at 16:30

## 2025-05-14 RX ADMIN — INSULIN LISPRO 5 UNITS: 100 INJECTION, SOLUTION INTRAVENOUS; SUBCUTANEOUS at 16:30

## 2025-05-14 RX ADMIN — FAMOTIDINE 20 MG: 20 TABLET, FILM COATED ORAL at 08:33

## 2025-05-14 RX ADMIN — ACETAMINOPHEN 975 MG: 325 TABLET, FILM COATED ORAL at 21:51

## 2025-05-14 RX ADMIN — HEPARIN SODIUM 4000 UNITS: 1000 INJECTION, SOLUTION INTRAVENOUS; SUBCUTANEOUS at 09:20

## 2025-05-14 RX ADMIN — HEPARIN SODIUM 2000 UNITS: 1000 INJECTION, SOLUTION INTRAVENOUS; SUBCUTANEOUS at 16:26

## 2025-05-14 RX ADMIN — METRONIDAZOLE 500 MG: 500 INJECTION, SOLUTION INTRAVENOUS at 04:30

## 2025-05-14 RX ADMIN — ACETAMINOPHEN 975 MG: 325 TABLET, FILM COATED ORAL at 13:31

## 2025-05-14 RX ADMIN — INSULIN LISPRO 2 UNITS: 100 INJECTION, SOLUTION INTRAVENOUS; SUBCUTANEOUS at 08:31

## 2025-05-14 RX ADMIN — INSULIN LISPRO 5 UNITS: 100 INJECTION, SOLUTION INTRAVENOUS; SUBCUTANEOUS at 11:32

## 2025-05-14 NOTE — ASSESSMENT & PLAN NOTE
Transferred to [facility] for surgery intervention  5/13 for right femoral endarterectomy and antegrade endovascular intervention.  .  Patient admitted to ICU for postop monitoring, with right femoral wound VAC in place.  Plan for d/c ASA, Plavix, hep gtt

## 2025-05-14 NOTE — ASSESSMENT & PLAN NOTE
Transferred to [facility] for surgery intervention  5/13 for right femoral endarterectomy and antegrade endovascular intervention.  .  Patient admitted to ICU for postop monitoring, with right femoral wound VAC in place.  Plan for d/c ASA, plavix load tonight, and restart hep gtt if no signs of bleeding 6hr post op

## 2025-05-14 NOTE — CONSULTS
Consultation - Critical Care/ICU   Name: Gold Van 79 y.o. male I MRN: [id number]  Unit/Bed#: ICU 02 I Date of Admission: 5/11/2025   Date of Service: 5/13/2025 I Hospital Day: 2   Consults  Physician Requesting Evaluation: Richard Aiken MD         ----------------------------------------------------------------------------------------  HPI:    Gold Van is a 79-year-old male with a history of asthma, CVA, type 2 diabetes, hyperlipidemia hypertension, macular degeneration, osteopenia, cardiomyopathy who presented to with diabetic foot ulcer with cellulitis and osteomyelitis.  He was initially up at [facility] with sepsis severe PAD.  Patient had negative blood cultures but was receiving Ancef and Flagyl he was then seen by vascular surgery who recommended femoral endarterectomy with possible bypass and was transferred to [facility] for vascular intervention.    Patient went with vascular on 5/13 for right femoral endarterectomy and antegrade endovascular intervention.  .  Patient admitted to ICU for postop monitoring, with right femoral wound VAC in place.    ---------------------------------------------------------------------------------------    Assessment & Plan  Sepsis without acute organ dysfunction (HCC)  History of diabetes mellitus paroxysmal atrial fibrillation asthma cardiomyopathy and PAD presented to [facility] with right foot wound  Met sepsis criteria at [facility]  Currently on cefazolin/metronidazole  Blood cultures no growth 5 days  Transferred to [facility] for vascular surgery.  Podiatry planning on subsequent right partial fifth ray resection  Type 2 diabetes mellitus with complication, without long-term current use of insulin (HCC)  Prior to admission glipizide linagliptin and metformin  Started on glargine 5 units with lispro 5 units 3 times daily during hospitalization  A1c 9.3.  Endocrinology consulted by vascular surgery    Mild intermittent asthma  without complication  Prior to admission on [facility].  No exacerbation  Essential hypertension  Continue amlodipine atenolol and lisinopril   Pruritus  Chronic pruritus follows with dermatology as an outpatient on prednisone 5 mg daily   Severe peripheral arterial disease (HCC)  Transferred to [facility] for surgery intervention  5/13 for right femoral endarterectomy and antegrade endovascular intervention.  .  Patient admitted to ICU for postop monitoring, with right femoral wound VAC in place.  Plan for d/c ASA, plavix load tonight, and restart hep gtt if no signs of bleeding 6hr post op   PAF (paroxysmal atrial fibrillation) ([facility])  Continue atenolol.  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  Review of Systems: Review of Systems   Respiratory:  Negative for shortness of breath.    Cardiovascular:  Negative for chest pain.   Gastrointestinal:  Positive for nausea. Negative for vomiting.   Genitourinary:  Negative for flank pain.   Neurological:  Negative for dizziness.       Historical Information   Past Medical History:  No date: Asthma  No date: COVID-19  No date: CVA (cerebral vascular accident) ([facility])  No date: Diabetes mellitus ([facility])  No date: Environmental allergies  No date: Hyperlipidemia  No date: Hypertension  07/13/2020: Macular degeneration      Comment:  right eye  No date: Orthostatic hypotension  No date: Osteopenia  No date: Pneumonia  No date: Pneumothorax  No date: Sinusitis Past Surgical History:  08/2024: CARPAL TUNNEL RELEASE; Left  07/2024: CATARACT EXTRACTION; Left  No date: COLONOSCOPY  1964: KNEE CARTILAGE SURGERY; Right  No date: VASECTOMY  No date: WISDOM TOOTH  EXTRACTION      Comment:  x4   Current Outpatient Medications   Medication Instructions    albuterol (ACCUNEB) 1.25 mg, Every 6 hours PRN    albuterol (PROVENTIL HFA,VENTOLIN HFA) 90 mcg/act inhaler 1 puff, As needed    amLODIPine (NORVASC) 5 mg, 2 times daily    apixaban (ELIQUIS) 5 mg, 2 times daily    aspirin 81 mg, Oral, Daily    atenolol (TENORMIN) 25 mg, Daily    atorvastatin (LIPITOR) 40 mg, Oral, Every evening    Budeson-Glycopyrrol-Formoterol (Breztri Aerosphere) 160-9-4.8 MCG/ACT AERO 2 puffs, Every 12 hours    collagenase (SANTYL) ointment Topical, Daily, To wounds of R lateral foot and heel    famotidine (PEPCID) 20 mg, Oral, 2 times daily    glipiZIDE (GLUCOTROL XL) 10 mg, 2 times daily    guaiFENesin (MUCINEX) 600 mg, Oral, 2 times daily    lisinopril (ZESTRIL) 20 mg, 2 times daily    LORazepam (ATIVAN) 0.5 mg, As needed    metFORMIN (GLUCOPHAGE-XR) 500 mg, Daily with dinner    montelukast (SINGULAIR) 10 mg, Oral, Daily at bedtime    Multiple Vitamins-Minerals (Multivitamin Adults) TABS 1 tablet, Daily    OneTouch Ultra test strip     predniSONE 5 mg, Oral, Every other day    Tradjenta 5 mg, Daily with dinner    Allergies   Allergen Reactions    Ciprofloxacin Shortness Of Breath    Sulfamethoxazole Shortness Of Breath    Trimethoprim Shortness Of Breath    Dexamethasone Other (See Comments)    Triamcinolone Other (See Comments)    Bactrim [Sulfamethoxazole-Trimethoprim] Fever     Fever of 107      Social History     Tobacco Use    Smoking status: Former    Smokeless tobacco: Never    Tobacco comments:     quit about 25 years ago   Vaping Use    Vaping status: Never Used   Substance Use Topics    Alcohol use: Never    Drug use: Never    Family History   Problem Relation Age of Onset    Asthma Mother     Emphysema Mother     Cancer Father     Asthma Brother     Cancer Brother           Objective :                   Vitals I/O      Most Recent Min/Max in 24hrs   Temp 98.8 °F (37.1 °C) Temp  Min: 98.4 °F  (36.9 °C)  Max: 98.8 °F (37.1 °C)   Pulse 76 Pulse  Min: 68  Max: 83   Resp 17 Resp  Min: 16  Max: 19   /58 BP  Min: 108/82  Max: 133/70   O2 Sat 93 % SpO2  Min: 90 %  Max: 100 %      Intake/Output Summary (Last 24 hours) at 5/13/2025 2057  Last data filed at 5/13/2025 1953  Gross per 24 hour   Intake 4675.76 ml   Output 3045 ml   Net 1630.76 ml       Diet Regan/CHO Controlled; Consistent Carbohydrate Diet Level 3 (6 carb servings/90 grams CHO/meal)    Invasive Monitoring           Physical Exam   Physical Exam  Vitals reviewed.   HENT:      Head: Normocephalic and atraumatic.   Cardiovascular:      Rate and Rhythm: Normal rate and regular rhythm.   Musculoskeletal:      Cervical back: Normal range of motion.      Comments: R fem wound vac in place, no drainage, site is soft   Abdominal: General: There is no distension.   Constitutional:       General: He is not in acute distress.     Appearance: He is not ill-appearing.   Pulmonary:      Effort: Pulmonary effort is normal.      Breath sounds: No wheezing.   Neurological:      General: No focal deficit present.      Mental Status: He is alert, oriented to person, place, and time and easily aroused.          Diagnostic Studies        Lab Results: I have reviewed the following results:     Medications:  Scheduled PRN   acetaminophen, 975 mg, Q8H SANDRA  [START ON 5/14/2025] amLODIPine, 5 mg, BID  atenolol, 25 mg, Daily  atorvastatin, 40 mg, QPM  Budeson-Glycopyrrol-Formoterol, 2 puff, BID  cefazolin, 2,000 mg, Q8H  [START ON 5/14/2025] clopidogrel, 75 mg, Daily  famotidine, 20 mg, BID  heparin (porcine), 5,000 Units, Q8H SANDRA  insulin glargine, 5 Units, HS  insulin lispro, 1-5 Units, TID AC  insulin lispro, 1-5 Units, HS  insulin lispro, 5 Units, TID With Meals  metroNIDAZOLE, 500 mg, Q12H  montelukast, 10 mg, HS  multivitamin-minerals, 1 tablet, Daily  predniSONE, 5 mg, Daily  [START ON 5/14/2025] senna, 1 tablet, Daily      albuterol, 1 puff, Q4H PRN  lactated  ringers, 500 mL, Once PRN   And  lactated ringers, 500 mL, Once PRN  morphine injection, 2 mg, Q4H PRN  ondansetron, 4 mg, Q6H PRN  oxyCODONE, 5 mg, Q4H PRN   Or  oxyCODONE, 10 mg, Q4H PRN  sodium chloride, 500 mL, Once PRN   And  sodium chloride, 500 mL, Once PRN       Continuous          Labs:   CBC    Recent Labs     05/13/25  0455 05/13/25  1512   WBC 7.93  --    HGB 10.8* 8.5*   HCT 32.7* 25*     --      BMP    Recent Labs     05/13/25  0455 05/13/25  1512   SODIUM 133*  --    K 4.2  --      --    CO2 26 22   AGAP 6  --    BUN 13  --    CREATININE 0.93  --    CALCIUM 8.7  --        Coags    Recent Labs     05/12/25  1305 05/13/25  0455   PTT 70* 28        Additional Electrolytes  Recent Labs     05/13/25  1512   CAIONIZED 1.09*          Blood Gas    No recent results  No recent results LFTs  Recent Labs     05/13/25  0455   ALT 22   AST 46*   ALKPHOS 62   ALB 3.1*   TBILI 0.33       Infectious  No recent results  Glucose  Recent Labs     05/13/25  0455   GLUC 206*        Administrative Statements

## 2025-05-14 NOTE — ASSESSMENT & PLAN NOTE
Continue atenolol.  Anticoagulation: Apixaban on hold for anticipated vascular procedure, see plan for hep gtt above

## 2025-05-14 NOTE — ASSESSMENT & PLAN NOTE
History of diabetes mellitus paroxysmal atrial fibrillation asthma cardiomyopathy and PAD presented to [facility] with right foot wound  Met sepsis criteria at [facility]  Currently on cefazolin/metronidazole  Blood cultures no growth 5 days  Transferred to [facility] for vascular surgery.

## 2025-05-14 NOTE — ASSESSMENT & PLAN NOTE
History of diabetes mellitus paroxysmal atrial fibrillation asthma cardiomyopathy and PAD presented to [facility] with right foot wound  Met sepsis criteria at [facility]  Currently on cefazolin/metronidazole  Blood cultures no growth 5 days  Transferred to [facility] for vascular surgery.  Podiatry planning on subsequent right partial fifth ray resection

## 2025-05-14 NOTE — CONSULTS
"e-Consult (IPC) - Endocrinology   Name: Gold Van 79 y.o. male I MRN: [id number]  Unit/Bed#: ICU 02 I Date of Admission: 5/11/2025   Date of Service: 5/14/2025 I Hospital Day: 3   Inpatient consult to Endocrinology  Consult performed by: Perla Nolasco MD  Consult ordered by: BELGICA Tran        Physician Requesting Evaluation: Bean Sweeney DO   Reason for Evaluation / Principal Problem:  T2DM    79yom with hx of PAD and non healing ulcer and worsening foot pain  Transferred from [place] to [place] for Vascular surgery.   POD 1 from femoral endarterectomy and also with plan for partial 5th ray amp now scheduled on 5/15/25.  ON review of records, he has Glipizide 2.5mg daily, metformin 500mg daily, and  tradjenta 5mg daily listed. Insulin only listed during inpatient admissions.   Currently ordered for Lantus 5units once daily and Humalog 5units with meals plus correctional scale.   Current A1c 9.3%  ASSESSMENT:  79yom with uncontrolled T2 DM and PAD    RECOMMENDATIONS:       1. T2 Diabetes, uncontrolled with hyperglycemia: Anticipate surgery on 5/15/25, but glucose currently uncontrolled I recommend glargine 15 units once daily tonight, and lispro 8units just prior to BF/Lunch/Dinner. Continue BG checks and correctional insulin as ordered. Will likely need more Lantus once surgeries are done.     Please message questions to the clinician covering the \"AL Endocrinology Call\" Role. Thank you.      11-20 minutes, >50% of the total time devoted to medical consultative verbal/EMR discussion between providers. Written report will be generated in the EMR.  "

## 2025-05-14 NOTE — ANESTHESIA PREPROCEDURE EVALUATION
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 ([facility])   (+) Essential hypertension   (+) Mixed hyperlipidemia   (+) PAF (paroxysmal atrial fibrillation) (HCC)   (+) Pulmonary hypertension (HCC) (Mild, PASP 37)   (+) Severe peripheral arterial disease ([facility]) (S/p right femoral endarterectomy yesterday)      ENDO   (+) Type 2 diabetes mellitus with complication, without long-term current use of insulin ([facility]) (A1C 9.3)      GI/HEPATIC   (+) Gastroesophageal reflux disease without esophagitis      NEURO/PSYCH   (+) CVA (cerebrovascular accident) (HCC)      PULMONARY   (+) Acute respiratory failure with hypoxia ([facility]) (SpO2 91% on RA)   (+) 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HCC)   (+) Mild intermittent asthma without complication   (+) Shortness of breath      Neurology/Sleep   (+) Foot drop, left   (+) Neuropathy      Surgery/Wound/Pain   (+) Ulcer of right foot with fat layer exposed (HCC)      Cardiovascular/Peripheral Vascular   (+) Cardiomyopathy (HCC) (EF 50%)      Other   (+) 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 Wall thickness is normal. The left ventricular ejection fraction is 50%. Systolic function is low normal. There is mild hypokinesia of the posterior wall. Diastolic function is mildly abnormal, consistent with grade I (abnormal) relaxation.    Aortic Valve: There is mild regurgitation.    The right ventricular systolic pressure is normal. The estimated right ventricular systolic pressure is 37.00 mmHg.       2024 PFTs:  Interpretation:   Moderate obstructive airflow defect on spirometry.   Positive post bronchodilator response.   Air trapping as indicated by the lung volumes.   Moderate decrease in diffusion  capacity.   Flow-volume loop consistent with obstruction  Physical Exam    Airway     Mallampati score: II  TM Distance: >3 FB  Neck ROM: full      Cardiovascular  Rhythm: regular, Rate: normal    Dental   Comment: Upper full and lower partial to be removed per-op     Pulmonary   Wheezes    Neurological    He appears awake, alert and oriented x3.      Other Findings        Anesthesia Plan  ASA Score- 3     Anesthesia Type- MAC with ASA Monitors.         Additional Monitors:     Airway Plan: natural airway.    Comment: GA PRN, Marked hyperglycemia, heparin gtt   Patient with transient post-operative confusion yesterday from femoral endarterectomy, resolved within a few hours.       Plan Factors-Exercise tolerance (METS): <4 METS.    Chart reviewed.   Existing labs reviewed.     Patient is not a current smoker.      Obstructive sleep apnea risk education given perioperatively.        Induction- intravenous.    Postoperative Plan- Plan for postoperative opioid use.   Monitoring Plan - Monitoring plan - standard ASA monitoring  Post Operative Pain Plan - plan for postoperative opioid use and field block    Perioperative Resuscitation Plan - Level 1 - Full Code.       Informed Consent- Anesthetic plan and risks discussed with patient.        NPO Status:  Vitals Value Taken Time   Date of last liquid 05/13/25 05/13/25 11:18   Time of last liquid 0830 05/13/25 11:18   Date of last solid 05/12/25 05/13/25 11:18   Time of last solid 2000 05/13/25 11:18

## 2025-05-14 NOTE — ASSESSMENT & PLAN NOTE
80 yo male ex-smoker w/ a hx of DM II (A1c 9.3), HTN, HLD, asthma, CVA (9/2024), PAF (eliquis), cardiomyopathy and PAD presented to [facility] on 5/7/25 w/ R foot pain x1 week and non-healing R 5th met wound x4-5 weeks. Pt admitted and started on ABX. Pt seen by podiatry w/ plans for R partial 5th ray resection on Thurs (5/15). Pt was transferred to [facility] on 5/11/25 for vascular surgical intervention.     Vascular surgery consulted for worsening R 5th met wound in the setting of PAD. CTA abd shows L CFA/SFA stenosis w/ focal occlusions and AT/peroneal occlusions w/ reconstitution. LEAD shows R PTA occlusion and R RIC: 0.42/15/16.    Pt is S/P R CFA/SFA endarterectomy w/ bovine pericardial patch, DCB and stenting of SFA, and angio of AT on 5/13.     Diagnostics:  -5/6/25 CTA abd w/ runoff: B/L EDY/EIA/IIA patent w/ atherosclerosis. Right: Focal high-grade stenosis of distal R CFA and diffuse atherosclerotic disease of R SFA resulting in moderate to severe stenoses with focal near complete occlusions at the proximal and distal segments. Short segment occlusions of proximal R AT and peroneal arteries with reconstitution. R PTA is occluded. Left: Occluded L SFA with reconstitution. L YUNG and PTA occluded. One-vessel runoff LLE through peroneal artery.  -4/18/25 LEAD: Right: >75% prox SFA and 50-75% dSFA stenosis. Distal PTA occlusion. R RIC: 0.42/15/16. Left: PTA and YUNG occlusions. L RIC: 1.06/85/45.   -4/17/25 MRI R foot: Possible early OM in R 5th met    Plan:  -R 5th met wound (+) OM in the setting of PAD  -S/P R CFA/SFA endarterectomy w/ bovine pericardial patch, DCB and stenting of SFA, and angio of AT on 5/13 (POD #1)  -ASA discontinued  -Plavix started for new SFA stents  -Continue plavix and lipitor for medical management of PAD  -Continue ABX for R foot wound per primary team   -Continue to hold eliquis until all vascular and podiatric procedures are completed; continue heparin gtt per primary team  -Podiatry  following w/ plans for R partial 5th ray resection Ththeresa (5/15); input appreciated  -Endo consult pending for assistance w/ DM control  -Critical Care following for medical management; assistance appreciated  -Okay for transfer out of ICU and return to IM as primary  -Will continue to follow on consult while pt remains hospitalized  -Will discuss with Dr. Aiken

## 2025-05-14 NOTE — DISCHARGE INSTR - AVS FIRST PAGE
DISCHARGE INSTRUCTIONS  LEG/BYPASS SURGERY    ACTIVITY:   Limit your physical activity to walking for the first week and then increase your activity as tolerated.  If you become short of breath or tired, stop and rest.  You may require help with walking or feel more secure with something to lean on.  Walking up steps and normal activities may be resumed as you feel ready.  Most people tire easily for the first few weeks following leg surgery.  This improves as conditioning returns.  Avoid strenuous activity such as vigorous exercise.  Avoid heavy lifting (do not lift more than 15 pounds) for the first four weeks after surgery.  You should not drive a car for at least two weeks following discharge from the hospital and you are off all narcotic pain medication.  You may ride in a car.    DIET:  Resume your normal diet.  Good nutrition is important for healing of your incision.  If you are discharged on narcotics for pain control, continue taking your stool softeners until you are having regular bowel movements.    INCISION:  You should shower daily.  Wash incision daily with soap and water, but do not rub or scrub the incision; rinse thoroughly and pat dry.  You have staples to close your incision and it is okay for these to get wet.  Do not bathe in a tub or swim for the first 4 week following surgery or if you have any open wounds.  It is normal to have swelling or discoloration around the incision.   If increasing redness or pain develops, call our office immediately.  Numbness in the region of the incision may occur following the surgery.  This normally improves over six to twelve months.      You have a groin incision. Place a clean dry piece of gauze to cover your groin incision to keep incision clean and dry and prevent your skin from sticking together.  Change gauze daily.  You have staples at your incisions.  These will be removed at your follow-up appointment or when they are ready to come out.  If you have  foot or leg wounds, please follow your podiatrist/wound care doctor's instructions for care.  If any of your incisions are open and require dressing changes, you will be given instructions for your daily incision care. If you are not able to change the dressings, a visiting nurse will be arranged.    DO NOT put any powders, creams, ointments, or lotions on your incision.    LEG SWELLING: Most patients have noticeable leg swelling after leg surgery. This usually improves within a few weeks. If swelling is present, elevate the leg whenever possible. Avoid sitting with the leg hanging down for prolonged periods of time.  Walking is beneficial.  An ACE bandage or support stocking may be helpful, but this should be discussed with your physician prior to use if you have a bypass.    FOLLOW UP STUDIES:  Doppler ultrasound studies are very important for long-term management.  Your surgeon will arrange this at your first postoperative visit.  Repeat studies are then scheduled every three months for the first year and periodically after this.    FOLLOW UP APPOINTMENTS:  Making and keeping follow up appointments and ultrasound tests are important to your recovery.  If you have difficulty making it to or keeping your follow up appointments, call the office.    If you have increased pain, fever >101.5, increased drainage, redness or a bad smell at your surgery site, new coldness/numbness of your arm or leg, please call us immediately and GO directly to the ER.    PLEASE CALL THE OFFICE IF YOU HAVE ANY QUESTIONS  [phone]  [phone] [address], [address], [place] [address]  [address] [place] [address]  [address] [place] [address]  [address] [facility] [place] [address]  [address] [facility], [address], [place] [address]  [address] [facility], [address], [place] [address]  [address] [facility], [facility] [address], [address]nd Floor, [place] [address] [address] [facility]  [address], [address], [address], [place] [address]  [address], [place] [address] [address] [facility], [address], [place] [address]

## 2025-05-14 NOTE — PROGRESS NOTES
Patient:  LENA SCHMIDT    MRN:  [id number]    Aidin Request ID:  [id number]    Level of care reserved:  Home Health Agency    Partner Reserved:  [facility], [place] [address] [phone]    Clinical needs requested:    Geography searched:  [phone]    Start of Service:    Request sent:  2:34pm EDT on 5/12/2025 by Yas Bedolla    Partner reserved:  3:47pm EDT on 5/14/2025 by Ginger Canela    Choice list shared:  2:45pm EDT on 5/14/2025 by Ginger Canela

## 2025-05-14 NOTE — PLAN OF CARE
Problem: PAIN - ADULT  Goal: Verbalizes/displays adequate comfort level or baseline comfort level  Description: Interventions:- Encourage patient to monitor pain and request assistance- Assess pain using appropriate pain scale- Administer analgesics based on type and severity of pain and evaluate response- Implement non-pharmacological measures as appropriate and evaluate response- Consider cultural and social influences on pain and pain management- Notify physician/advanced practitioner if interventions unsuccessful or patient reports new pain  5/13/2025 2331 by Timothy Delarosa RN  Outcome: Progressing  5/13/2025 2330 by Timothy Delarosa RN  Outcome: Progressing     Problem: DISCHARGE PLANNING  Goal: Discharge to home or other facility with appropriate resources  Description: INTERVENTIONS:- Identify barriers to discharge w/patient and caregiver- Arrange for needed discharge resources and transportation as appropriate- Identify discharge learning needs (meds, wound care, etc.)- Arrange for interpretive services to assist at discharge as needed- Refer to Case Management Department for coordinating discharge planning if the patient needs post-hospital services based on physician/advanced practitioner order or complex needs related to functional status, cognitive ability, or social support system  5/13/2025 2331 by Timothy Delarosa RN  Outcome: Progressing  5/13/2025 2330 by Timothy Delarosa RN  Outcome: Progressing     Problem: SKIN/TISSUE INTEGRITY - ADULT  Goal: Skin Integrity remains intact(Skin Breakdown Prevention)  Description: Assess:-Perform Sae assessment -Clean and moisturize skin -Inspect skin when repositioning, toileting, and assisting with ADLS-Assess under medical devices -Assess extremities for adequate circulation and sensation Bed Management:-Have minimal linens on bed & keep smooth, unwrinkled-Change linens as needed when moist or perspiring-Avoid sitting or lying in one position for more than 2  hours while in bed-Keep HOB at 30 degrees Toileting:-Offer bedside commode-Assess for incontinence -Use incontinent care products after each incontinent episode Activity:-Mobilize patient 3 times a day-Encourage activity and walks on unit-Encourage or provide ROM exercises -Turn and reposition patient every 2 Hours-Use appropriate equipment to lift or move patient in bed-Instruct/ Assist with weight shifting  when out of bed in chair-Consider limitation of chair time 2 hour intervalsSkin Care:-Avoid use of baby powder, tape, friction and shearing, hot water or constrictive clothing-Relieve pressure over bony prominences Do not massage red bony areasNext Steps:-Teach patient strategies to minimize risks Consider consults to  interdisciplinary teams   5/13/2025 2331 by Timothy Delarosa RN  Outcome: Progressing  5/13/2025 2330 by Timothy Delarosa RN  Outcome: Progressing  Goal: Incision(s), wounds(s) or drain site(s) healing without S/S of infection  Description: INTERVENTIONS- Assess and document dressing, incision, wound bed, drain sites and surrounding tissue- Provide patient and family education- Perform skin care/dressing changes every shift/PRN/as ordered  5/13/2025 2331 by Timothy Delarosa RN  Outcome: Progressing  5/13/2025 2330 by Timothy Delarosa RN  Outcome: Progressing  Goal: Pressure injury heals and does not worsen  Description: Interventions:- Implement low air loss mattress or specialty surface (Criteria met)- Apply silicone foam dressing- Instruct/assist with weight shifting every 30 minutes when in chair - Limit chair time to 2 hour intervals- Use special pressure reducing interventions such as waffle cushion when in chair - Apply fecal or urinary incontinence containment device - Perform passive or active ROM every 2 hrs- Turn and reposition patient & offload bony prominences every 2 hours - Utilize friction reducing device or surface for transfers  5/13/2025 2331 by Timothy Delarosa RN  Outcome:  Progressing  5/13/2025 2330 by Timothy Delarosa RN  Outcome: Progressing     Problem: Nutrition/Hydration-ADULT  Goal: Nutrient/Hydration intake appropriate for improving, restoring or maintaining nutritional needs  Description: Monitor and assess patient's nutrition/hydration status for malnutrition. Collaborate with interdisciplinary team and initiate plan and interventions as ordered.  Monitor patient's weight and dietary intake as ordered or per policy. Utilize nutrition screening tool and intervene as necessary. Determine patient's food preferences and provide high-protein, high-caloric foods as appropriate. INTERVENTIONS:- Monitor oral intake, urinary output, labs, and treatment plans- Assess nutrition and hydration status and recommend course of action- Evaluate amount of meals eaten- Assist patient with eating if necessary - Allow adequate time for meals- Recommend/ encourage appropriate diets, oral nutritional supplements, and vitamin/mineral supplements- Order, calculate, and assess calorie counts as needed- Recommend, monitor, and adjust tube feedings and TPN/PPN based on assessed needs- Assess need for intravenous fluids- Provide specific nutrition/hydration education as appropriate- Include patient/family/caregiver in decisions related to nutrition  5/13/2025 2331 by Timothy Delarosa RN  Outcome: Progressing  5/13/2025 2330 by Timothy Delarosa RN  Outcome: Progressing

## 2025-05-14 NOTE — PROGRESS NOTES
Vascular Nurse Navigator Post Op Education    Met with patient and wife Stacia (on speaker phone) at bedside to introduce myself as Vascular Nurse Navigator and explained my role.  Patient is appropriate and accepting to education. Patient was educated with Review of written materials provided, Teachback, Explanation, Demonstration, and Question & Answer on expectations of post op care and recovery on right femoral endarterectomy and antegrade endovascular intervention. Patient is a former smoker, quit 25 years ago, as such Smoking effects on the lungs, tobacco triggers, and Smoking cessation was reviewed. Education provided to patient and his wife Stacia on speaker phone on infection prevention, activity limitations, when to call the office, importance of follow up, and incisional care.  Discharge instruction handout provided to patient to review.  Provided education on Prevena vac care and removal as well as provided them with a pamphlet on the Prevena vac.  Provided him with a pack of disposable washcloths, a pack of guaze, and a bottle of chlorhexidine for incision care upon discharge.

## 2025-05-14 NOTE — QUICK NOTE
Podiatry Quick Note  -Patients case to be moved to 5/16 due to surgeon and OR availability  -NPO midnight for case 5/16  -Will discuss with patient in the morning of 5/15

## 2025-05-14 NOTE — DISCHARGE INSTR - OTHER ORDERS
Wound Care Plan:   1-Remedy Silicone Cream/Hydraguard lotion to bilateral heels, buttocks, and sacrum twice daily and as needed.  2-Elevate heels off of bed/chair surface--offloading heel boots  3-Offloading air cushion in chair when out of bed.  4-Apply lotion to body daily and as needed.  5-Turn/reposition every 2 hours for pressure re-distribution on skin.   6-Right foot per podiatry team recommendations.  7-Right anterior thigh/groin incision per vascular team recommendations (see above).  8-Left pretibial skin tears--cleanse with Vashe soak, remove, pat dry.  Apply Dermagran and cover with silicone bordered foam dressing.  Change dressing every other day and as needed.    Wound VAC application  1. Number of sponges: 1 black sponge  2. Pressure settin continuous  3. Size of wound: 9.5 x 3.5 x 1.5 cm  4. Description of wound: fibrogranular wound base with exposed bone.

## 2025-05-14 NOTE — PROGRESS NOTES
Progress Note - Vascular Surgery   Name: Gold Van 79 y.o. male I MRN: [id number]  Unit/Bed#: ICU 02 I Date of Admission: 5/11/2025   Date of Service: 5/14/2025 I Hospital Day: 3    Assessment & Plan  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 (HCC)  78 yo male ex-smoker w/ a hx of DM II (A1c 9.3), HTN, HLD, asthma, CVA (9/2024), PAF (eliquis), cardiomyopathy and PAD presented to [facility] on 5/7/25 w/ R foot pain x1 week and non-healing R 5th met wound x4-5 weeks. Pt admitted and started on ABX. Pt seen by podiatry w/ plans for R partial 5th ray resection on Thurs (5/15). Pt was transferred to [facility] on 5/11/25 for vascular surgical intervention.     Vascular surgery consulted for worsening R 5th met wound in the setting of PAD. CTA abd shows L CFA/SFA stenosis w/ focal occlusions and AT/peroneal occlusions w/ reconstitution. LEAD shows R PTA occlusion and R RIC: 0.42/15/16.    Pt is S/P R CFA/SFA endarterectomy w/ bovine pericardial patch, DCB and stenting of SFA, and angio of AT on 5/13.     Diagnostics:  -5/6/25 CTA abd w/ runoff: B/L EDY/EIA/IIA patent w/ atherosclerosis. Right: Focal high-grade stenosis of distal R CFA and diffuse atherosclerotic disease of R SFA resulting in moderate to severe stenoses with focal near complete occlusions at the proximal and distal segments. Short segment occlusions of proximal R AT and peroneal arteries with reconstitution. R PTA is occluded. Left: Occluded L SFA with reconstitution. L YUNG and PTA occluded. One-vessel runoff LLE through peroneal artery.  -4/18/25 LEAD: Right: >75% prox SFA and 50-75% dSFA stenosis. Distal PTA occlusion. R RIC: 0.42/15/16. Left: PTA and YUNG occlusions. L RIC: 1.06/85/45.   -4/17/25 MRI R foot: Possible early OM in R 5th met    Plan:  -R 5th met wound (+) OM in the setting of PAD  -S/P R CFA/SFA endarterectomy w/ bovine pericardial patch, DCB and stenting of SFA, and angio of AT on 5/13 (POD #1)  -ASA  discontinued  -Plavix started for new SFA stents  -Continue plavix and lipitor for medical management of PAD  -Continue ABX for R foot wound per primary team   -Continue to hold eliquis until all vascular and podiatric procedures are completed; continue heparin gtt per primary team  -Podiatry following w/ plans for R partial 5th ray resection Thurs (5/15); input appreciated  -Endo consult pending for assistance w/ DM control  -Critical Care following for medical management; assistance appreciated  -Okay for transfer out of ICU and return to [facility] as primary  -Will continue to follow on consult while pt remains hospitalized  -Will discuss with Dr. Aiken    Subjective : Pt seen for exam while sitting in his chair; NAD. Pt reports soreness at L groin incision which is controlled w/ routine tylenol and prn oxycodone. He reports pain at R foot wound, which he states is slightly improved since surgery. Otherwise, he denies any further complaints at this time.    Objective :  Temp:  [98.4 °F (36.9 °C)-99.9 °F (37.7 °C)] 98.8 °F (37.1 °C)  HR:  [68-93] 86  BP: (103-134)/(55-82) 115/58  Resp:  [14-29] 20  SpO2:  [91 %-100 %] 94 %  O2 Device: None (Room air)     I/O         05/12 0701  05/13 0700 05/13 0701  05/14 0700 05/14 0701  05/15 0700    P.O. 540  120    I.V. (mL/kg)  4736.6 (64.4) 9.9 (0.1)    IV Piggyback  50 50    Total Intake(mL/kg) 540 (7.3) 4786.6 (65.1) 179.9 (2.4)    Urine (mL/kg/hr) 1300 (0.7) 2030 (1.2) 100 (0.3)    Blood  350     Total Output 1300 2380 100    Net -760 +2406.6 +79.9                 Lines/Drains/Airways       Active Status       Name Placement date Placement time Site Days    Urethral Catheter Latex 16 Fr. 05/13/25  --  Latex  1                  Physical Exam  Vitals and nursing note reviewed.   Constitutional:       General: He is awake. He is not in acute distress.     Appearance: Normal appearance. He is well-developed.   HENT:      Head: Normocephalic and atraumatic.     Cardiovascular:       Rate and Rhythm: Normal rate and regular rhythm.      Pulses:           Dorsalis pedis pulses are detected w/ Doppler on the right side and detected w/ Doppler on the left side.        Posterior tibial pulses are detected w/ Doppler on the right side and detected w/ Doppler on the left side.      Heart sounds: Normal heart sounds.      Comments: R foot edema  Pulmonary:      Effort: Pulmonary effort is normal. No respiratory distress.      Breath sounds: Normal breath sounds.   Abdominal:      General: There is no distension.      Palpations: Abdomen is soft.      Tenderness: There is no abdominal tenderness.     Musculoskeletal:         General: No swelling.      Cervical back: Neck supple.     Skin:     General: Skin is warm and dry.      Capillary Refill: Capillary refill takes less than 2 seconds.      Findings: Wound present.      Comments: R 5th met wound     Neurological:      General: No focal deficit present.      Mental Status: He is alert and oriented to person, place, and time. Mental status is at baseline.     Psychiatric:         Mood and Affect: Mood normal.         Speech: Speech normal.         Behavior: Behavior normal. Behavior is cooperative.         Thought Content: Thought content normal.         Judgment: Judgment normal.       Wound/Incision:  R 5th metatarsal wound w/ dressing clean, dry and intact. R groin incision site soft, no ecchymosis; incision covered w/ prevena vac. Prevena vac suction adequate w/ no drainage in collection canister or tubing.       Lab Results: I have reviewed the following results:  CBC with diff:   Lab Results   Component Value Date    WBC 11.03 (H) 05/14/2025    HGB 8.4 (L) 05/14/2025    HCT 25.6 (L) 05/14/2025    MCV 91 05/14/2025     05/14/2025    RBC 2.81 (L) 05/14/2025    MCH 29.9 05/14/2025    MCHC 32.8 05/14/2025    RDW 14.1 05/14/2025    MPV 9.4 05/14/2025    NRBC 0 05/07/2025     BMP/CMP:  Lab Results   Component Value Date    SODIUM 134 (L)  05/14/2025    K 4.6 05/14/2025    K 4.5 04/14/2015     05/14/2025     04/14/2015    CO2 23 05/14/2025    CO2 22 05/13/2025    ANIONGAP 8 04/14/2015    BUN 14 05/14/2025    BUN 10 04/14/2015    CREATININE 1.13 05/14/2025    CREATININE 0.86 04/14/2015    GLUCOSE 262 (H) 05/13/2025    GLUCOSE 179 (H) 04/14/2015    CALCIUM 8.1 (L) 05/14/2025    CALCIUM 8.9 04/14/2015    AST 31 05/14/2025    AST 12 08/12/2014    ALT 15 05/14/2025    ALT 19 08/12/2014    ALKPHOS 52 05/14/2025    ALKPHOS 101 08/12/2014    PROT 6.8 08/12/2014    BILITOT 0.6 08/12/2014    EGFR 61 05/14/2025     Coags:   Lab Results   Component Value Date    PTT 37 (H) 05/14/2025    INR 1.14 05/07/2025     Blood Culture:   Lab Results   Component Value Date    BLOODCX No Growth After 5 Days. 05/07/2025    BLOODCX No Growth After 5 Days. 05/07/2025     Urinalysis:   Lab Results   Component Value Date    COLORU Yellow 05/07/2025    COLORU Yellow 08/12/2014    CLARITYU Clear 05/07/2025    CLARITYU Clear 08/12/2014    SPECGRAV 1.010 05/07/2025    SPECGRAV 1.025 08/12/2014    PHUR 6.0 05/07/2025    PHUR 6.0 06/14/2016    PHUR 6.0 08/12/2014    LEUKOCYTESUR Negative 05/07/2025    LEUKOCYTESUR Negative 08/12/2014    NITRITE Negative 05/07/2025    NITRITE Negative 08/12/2014    PROTEINUA Negative 08/12/2014    GLUCOSEU Negative 05/07/2025    GLUCOSEU 100 (1/10%) (A) 08/12/2014    KETONESU Negative 05/07/2025    KETONESU Negative 08/12/2014    BILIRUBINUR Negative 05/07/2025    BILIRUBINUR Negative 08/12/2014    BLOODU 1+ (A) 05/07/2025    BLOODU Trace (A) 08/12/2014

## 2025-05-14 NOTE — PROGRESS NOTES
Podiatry - Progress Note  Patient: Gold Van 79 y.o. male   MRN: [id number]  PCP: Shayne Diaz MD  Unit/Bed#: ICU 02 Encounter: [id number]  Date Of Visit: 25    ASSESSMENT:    Gold Van is a 79 y.o. male with:    Right 5th metatarsal ulceration with underlying OM - [place] 4  R foot cellulitis  PAD  - s/p right femoral endarterectomy and antegrade endovascular intervention (25)  T2DM      PLAN:    Patient is POD1 s/p s/p right femoral endarterectomy and antegrade endovascular intervention with vascular surgery.  Dressings changed at bedside. Plan for right partial 5th ray resection tomorrow 5/15 with Dr. Brewer.  Patient understands that not moving forward with this may result in worsening infection, possible sepsis, possible loss of limb. He is agreeable to the procedure. Any questions/concerns addressed. Consent obtained at bedside. To be NPO after midnight.   Continue IV abx per primary team.   Continue local wound care, appreciate nursing assistance with dressing changes.  Elevation and offloading on green foam wedges or pillows when non-ambulatory.  Rest of care per primary team.     Weightbearing status: Weightbearing as tolerated    SUBJECTIVE:     The patient was seen, evaluated, and assessed at bedside today. The patient was awake, alert, and in no acute distress. No acute events overnight. The patient reports no pain to his right foot at this time. Patient denies N/V/F/chills/SOB/CP.      OBJECTIVE:     Vitals:   /57 (BP Location: Right arm)   Pulse 91   Temp 99.7 °F (37.6 °C) (Oral)   Resp (!) 26   Wt 73.5 kg (162 lb 0.6 oz)   SpO2 92%   BMI 21.98 kg/m²     Temp (24hrs), Av.2 °F (37.3 °C), Min:98.4 °F (36.9 °C), Max:99.9 °F (37.7 °C)      Physical Exam:     Lungs: Non labored breathing  Abdomen: Soft, non-tender.  Lower Extremity:  Cardiovascular status at baseline from admission.  Neurological status at baseline from admission.  Musculoskeletal status at baseline from  "admission. No calf tenderness noted.     RLE: Lateral 5th MTPJ well adhered eschar with surrounding maceration. Erythema and edema to forefoot. Dusky discoloration to 5th digit. 4th webspace maceration. No fluctuance, crepitus, purulence or drainage.     Clinical Images 05/14/25:                Additional Data:     Labs:    Results from last 7 days   Lab Units 05/14/25  0436 05/08/25  0425 05/07/25  0900   WBC Thousand/uL 11.03*   < > 12.54*   HEMOGLOBIN g/dL 8.4*   < > 11.5*   I STAT HEMOGLOBIN   --    < >  --    HEMATOCRIT % 25.6*   < > 36.0*   HEMATOCRIT, ISTAT   --    < >  --    PLATELETS Thousands/uL 222   < > 310   SEGS PCT %  --   --  75   LYMPHO PCT %  --   --  14   MONO PCT %  --   --  8   EOS PCT %  --   --  2    < > = values in this interval not displayed.     Results from last 7 days   Lab Units 05/14/25  0436 05/13/25  1512   POTASSIUM mmol/L 4.6  --    CHLORIDE mmol/L 104  --    CO2 mmol/L 23  --    CO2, I-STAT mmol/L  --  22   BUN mg/dL 14  --    CREATININE mg/dL 1.13  --    CALCIUM mg/dL 8.1*  --    ALK PHOS U/L 52  --    ALT U/L 15  --    AST U/L 31  --    GLUCOSE, ISTAT mg/dl  --  262*     Results from last 7 days   Lab Units 05/07/25  0900   INR  1.14       * I Have Reviewed All Lab Data Listed Above.    Recent Cultures (last 7 days):     Results from last 7 days   Lab Units 05/07/25  0900   BLOOD CULTURE  No Growth After 5 Days.  No Growth After 5 Days.           Imaging: I have personally reviewed pertinent films in PACS  EKG, Pathology, and Other Studies: I have personally reviewed pertinent reports.    ** Please Note: Portions of the record may have been created with voice recognition software. Occasional wrong word or \"sound a like\" substitutions may have occurred due to the inherent limitations of voice recognition software. Read the chart carefully and recognize, using context, where substitutions have occurred. **      "

## 2025-05-14 NOTE — CASE MANAGEMENT
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 ([facility]) 2025    PAF (paroxysmal atrial fibrillation) ([facility]) 2025    Cardiomyopathy ([facility]) 2025    Ulcer of right foot with fat layer exposed ([facility]) 2025    Severe peripheral arterial disease ([facility]) 2024    Moderate protein-calorie malnutrition ([facility]) 10/09/2024    Gastroesophageal reflux disease without esophagitis 10/04/2024    Impaired mobility and activities of daily living 10/04/2024    Neuropathy 10/04/2024    Foot drop, left 10/04/2024    Abnormal echocardiogram 10/03/2024    CVA (cerebrovascular accident) ([facility]) 10/02/2024    Dysmetria 10/01/2024    COVID-19 2024    Acute respiratory insufficiency 2024    Shortness of breath 2024    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    History of pneumothorax 2024    Acute respiratory failure with hypoxia ([facility]) 2024    Non-recurrent bilateral inguinal hernia without obstruction or gangrene 2024    Pruritus 2024    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 2021    Hyponatremia 2021    Pulmonary nodule 2021    Type 2 diabetes mellitus with complication, without long-term current use of insulin ([facility]) 10/23/2017    Essential hypertension 10/23/2017    Mild intermittent asthma without complication 10/23/2017    Mixed hyperlipidemia 10/23/2017      LOS (days): 3  Geometric Mean LOS (GMLOS) (days): 3.8  Days to GMLOS:1.1     OBJECTIVE:  Risk of Unplanned  Readmission Score: 32.94         Current admission status: Inpatient   Preferred Pharmacy:   [facility] - [place] - [address]  [address]  [place] [address]  Phone: [phone] Fax: [phone]    [facility] - [place] - [address]  [address]  [place] [address]  Phone: [phone] Fax: [phone]    [facility] (Counts include 234 beds at the [facility]) #[id number] [place], [place] - [address] [address]  Phone: [phone] Fax: [phone]    Primary Care Provider: Shayne Diaz MD    Primary Insurance: [employer]  Secondary Insurance:     DISCHARGE DETAILS:    Discharge planning discussed with:: patient  Freedom of Choice: Yes  Comments - Freedom of Choice: [facility] reserved in [facility] for HHC services     Were Treatment Team discharge recommendations reviewed with patient/caregiver?: Yes  Did patient/caregiver verbalize understanding of patient care needs?: Yes  Were patient/caregiver advised of the risks associated with not following Treatment Team discharge recommendations?: Yes    Contacts  Patient Contacts: Mya Van  Relationship to Patient:: Family  Reason/Outcome: Continuity of Care, Discharge Planning    Requested Home Health Care         Is the patient interested in HHC at discharge?: Yes  Home Health Discipline requested:: Occupational Therapy, Physical Therapy, Nursing  Home Health Agency Name:: [facility]  HHA External Referral Reason (only applicable if external HHA name selected): Patient has established relationship with provider  Home Health Follow-Up Provider:: PCP  Home Health Services Needed:: Strengthening/Theraputic Exercises to Improve Function, Evaluate Functional Status and Safety, Gait/ADL Training  Homebound Criteria Met:: Uses an Assist Device (i.e. cane, walker, etc), Requires the Assistance of Another Person for Safe Ambulation or to Leave the Home  Supporting  Clincal Findings:: Fatigues Easliy in Short Distances, Limited Endurance    DME Referral Provided  Referral made for DME?: No    Other Referral/Resources/Interventions Provided:  Interventions: HHC  Referral Comments: [facility] services reserved  [facility]       Treatment Team Recommendation: Home with Home Health Care  Discharge Destination Plan:: Home with Home Health Care  Transport at Discharge : Family         Additional Comments: CM reviewed the [facility] choice list with the patient and [facility] reserved.  CM department will continue to follow the patient through hospital discharge.

## 2025-05-14 NOTE — PLAN OF CARE
Problem: PAIN - ADULT  Goal: Verbalizes/displays adequate comfort level or baseline comfort level  Description: Interventions:- Encourage patient to monitor pain and request assistance- Assess pain using appropriate pain scale- Administer analgesics based on type and severity of pain and evaluate response- Implement non-pharmacological measures as appropriate and evaluate response- Consider cultural and social influences on pain and pain management- Notify physician/advanced practitioner if interventions unsuccessful or patient reports new pain  Outcome: Progressing     Problem: DISCHARGE PLANNING  Goal: Discharge to home or other facility with appropriate resources  Description: INTERVENTIONS:- Identify barriers to discharge w/patient and caregiver- Arrange for needed discharge resources and transportation as appropriate- Identify discharge learning needs (meds, wound care, etc.)- Arrange for interpretive services to assist at discharge as needed- Refer to Case Management Department for coordinating discharge planning if the patient needs post-hospital services based on physician/advanced practitioner order or complex needs related to functional status, cognitive ability, or social support system  Outcome: Progressing     Problem: INFECTION - ADULT  Goal: Absence or prevention of progression during hospitalization  Description: INTERVENTIONS:  - Assess and monitor for signs and symptoms of infection  - Monitor lab/diagnostic results  - Monitor all insertion sites, i.e. indwelling lines, tubes, and drains  - Monitor endotracheal if appropriate and nasal secretions for changes in amount and color  - [place] appropriate cooling/warming therapies per order  - Administer medications as ordered  - Instruct and encourage patient and family to use good hand hygiene technique  - Identify and instruct in appropriate isolation precautions for identified infection/condition  Outcome: Progressing  Goal: Absence of  fever/infection during neutropenic period  Description: INTERVENTIONS:  - Monitor WBC    Outcome: Progressing     Problem: SAFETY ADULT  Goal: Patient will remain free of falls  Description: INTERVENTIONS:  - Educate patient/family on patient safety including physical limitations  - Instruct patient to call for assistance with activity   - Consult OT/PT to assist with strengthening/mobility   - Keep Call bell within reach  - Keep bed low and locked with side rails adjusted as appropriate  - Keep care items and personal belongings within reach  - Initiate and maintain comfort rounds  - Make Fall Risk Sign visible to staff  - Offer Toileting every 2 Hours, in advance of need  - Initiate/Maintain bed alarm  - Obtain necessary fall risk management equipment:   - Apply yellow socks and bracelet for high fall risk patients  - Consider moving patient to room near nurses station  Outcome: Progressing  Goal: Maintain or return to baseline ADL function  Description: INTERVENTIONS:  -  Assess patient's ability to carry out ADLs; assess patient's baseline for ADL function and identify physical deficits which impact ability to perform ADLs (bathing, care of mouth/teeth, toileting, grooming, dressing, etc.)  - Assess/evaluate cause of self-care deficits   - Assess range of motion  - Assess patient's mobility; develop plan if impaired  - Assess patient's need for assistive devices and provide as appropriate  - Encourage maximum independence but intervene and supervise when necessary  - Involve family in performance of ADLs  - Assess for home care needs following discharge   - Consider OT consult to assist with ADL evaluation and planning for discharge  - Provide patient education as appropriate  Outcome: Progressing  Goal: Maintains/Returns to pre admission functional level  Description: INTERVENTIONS:  - Perform AM-PAC 6 Click Basic Mobility/ Daily Activity assessment daily.  - Set and communicate daily mobility goal to care team  and patient/family/caregiver.   - Collaborate with rehabilitation services on mobility goals if consulted  - Perform Range of Motion 4 times a day.  - Reposition patient every 3 hours.  - Dangle patient 3 times a day  - Stand patient 3 times a day  - Ambulate patient 3 times a day  - Out of bed to chair 3 times a day   - Out of bed for meals 3 times a day  - Out of bed for toileting  - Record patient progress and toleration of activity level   Outcome: Progressing     Problem: Knowledge Deficit  Goal: Patient/family/caregiver demonstrates understanding of disease process, treatment plan, medications, and discharge instructions  Description: Complete learning assessment and assess knowledge base.  Interventions:  - Provide teaching at level of understanding  - Provide teaching via preferred learning methods  Outcome: Progressing     Problem: SKIN/TISSUE INTEGRITY - ADULT  Goal: Skin Integrity remains intact(Skin Breakdown Prevention)  Description: Assess:-Perform Sae assessment -Clean and moisturize skin -Inspect skin when repositioning, toileting, and assisting with ADLS-Assess under medical devices -Assess extremities for adequate circulation and sensation Bed Management:-Have minimal linens on bed & keep smooth, unwrinkled-Change linens as needed when moist or perspiring-Avoid sitting or lying in one position for more than 2 hours while in bed-Keep HOB at 30 degrees Toileting:-Offer bedside commode-Assess for incontinence -Use incontinent care products after each incontinent episode Activity:-Mobilize patient 3 times a day-Encourage activity and walks on unit-Encourage or provide ROM exercises -Turn and reposition patient every 2 Hours-Use appropriate equipment to lift or move patient in bed-Instruct/ Assist with weight shifting  when out of bed in chair-Consider limitation of chair time 2 hour intervalsSkin Care:-Avoid use of baby powder, tape, friction and shearing, hot water or constrictive clothing-Relieve  pressure over bony prominences Do not massage red bony areasNext Steps:-Teach patient strategies to minimize risks Consider consults to  interdisciplinary teams   Outcome: Progressing  Goal: Incision(s), wounds(s) or drain site(s) healing without S/S of infection  Description: INTERVENTIONS- Assess and document dressing, incision, wound bed, drain sites and surrounding tissue- Provide patient and family education- Perform skin care/dressing changes every 4  Outcome: Progressing  Goal: Pressure injury heals and does not worsen  Description: Interventions:- Implement low air loss mattress or specialty surface (Criteria met)- Apply silicone foam dressing- Instruct/assist with weight shifting every 2 minutes when in chair - Limit chair time to 3 hour intervals- Use special pressure reducing interventions such as  when in chair - Apply fecal or urinary incontinence containment device - Perform passive or active ROM every 4- Turn and reposition patient & offload bony prominences every 2 hours - Utilize friction reducing device or surface for transfers - Consider consults to  interdisciplinary teams such as - Use incontinent care products after each incontinent episode such as - Consider nutrition services referral as needed  Outcome: Progressing     Problem: Nutrition/Hydration-ADULT  Goal: Nutrient/Hydration intake appropriate for improving, restoring or maintaining nutritional needs  Description: Monitor and assess patient's nutrition/hydration status for malnutrition. Collaborate with interdisciplinary team and initiate plan and interventions as ordered.  Monitor patient's weight and dietary intake as ordered or per policy. Utilize nutrition screening tool and intervene as necessary. Determine patient's food preferences and provide high-protein, high-caloric foods as appropriate. INTERVENTIONS:- Monitor oral intake, urinary output, labs, and treatment plans- Assess nutrition and hydration status and recommend course of  action- Evaluate amount of meals eaten- Assist patient with eating if necessary - Allow adequate time for meals- Recommend/ encourage appropriate diets, oral nutritional supplements, and vitamin/mineral supplements- Order, calculate, and assess calorie counts as needed- Recommend, monitor, and adjust tube feedings and TPN/PPN based on assessed needs- Assess need for intravenous fluids- Provide specific nutrition/hydration education as appropriate- Include patient/family/caregiver in decisions related to nutrition  Outcome: Progressing     Problem: Prexisting or High Potential for Compromised Skin Integrity  Goal: Skin integrity is maintained or improved  Description: INTERVENTIONS:  - Identify patients at risk for skin breakdown  - Assess and monitor skin integrity  - Assess and monitor nutrition and hydration status  - Monitor labs   - Assess for incontinence   - Turn and reposition patient  - Assist with mobility/ambulation  - Relieve pressure over bony prominences  - Avoid friction and shearing  - Provide appropriate hygiene as needed including keeping skin clean and dry  - Evaluate need for skin moisturizer/barrier cream  - Collaborate with interdisciplinary team   - Patient/family teaching  - Consider wound care consult   Outcome: Progressing

## 2025-05-14 NOTE — ASSESSMENT & PLAN NOTE
Prior to admission glipizide linagliptin and metformin  Started on glargine 5 units with lispro 5 units 3 times daily during hospitalization  A1c 9.3.  Endocrinology consulted by vascular surgery

## 2025-05-14 NOTE — PROGRESS NOTES
"Progress Note - Critical Care/ICU   Name: Gold Van 79 y.o. male I MRN: [id number]  Unit/Bed#: ICU 02 I Date of Admission: 5/11/2025   Date of Service: 5/14/2025 I Hospital Day: 3       ----------------------------------------------------------------------------------------  HPI:  \" Gold Van is a 79-year-old male with a history of asthma, CVA, type 2 diabetes, hyperlipidemia hypertension, macular degeneration, osteopenia, cardiomyopathy who presented to with diabetic foot ulcer with cellulitis and osteomyelitis.  He was initially up at [facility] with sepsis severe PAD.  Patient had negative blood cultures but was receiving Ancef and Flagyl he was then seen by vascular surgery who recommended femoral endarterectomy with possible bypass and was transferred to [facility] for vascular intervention.     Patient went with vascular on 5/13 for right femoral endarterectomy and antegrade endovascular intervention.  .  Patient admitted to ICU for postop monitoring, with right femoral wound VAC in place. \"    Overnight Events:  No acute events, no bleeding noted, heparin gtt restarted     ---------------------------------------------------------------------------------------    Assessment & Plan  Sepsis without acute organ dysfunction (HCC)  History of diabetes mellitus paroxysmal atrial fibrillation asthma cardiomyopathy and PAD presented to [facility] with right foot wound  Met sepsis criteria at [facility]  Currently on cefazolin/metronidazole  Blood cultures no growth 5 days  Transferred to [facility] for vascular surgery.  Type 2 diabetes mellitus with complication, without long-term current use of insulin (HCC)  Prior to admission glipizide linagliptin and metformin  Started on glargine 5 units with lispro 5 units 3 times daily during hospitalization  A1c 9.3.  Endocrinology consulted by vascular surgery    Mild intermittent asthma without complication  Prior to admission on [facility].  No " exacerbation  Essential hypertension  Continue amlodipine atenolol and lisinopril   Pruritus  Chronic pruritus follows with dermatology as an outpatient on prednisone 5 mg daily   Severe peripheral arterial disease (HCC)  Transferred to [facility] for surgery intervention  5/13 for right femoral endarterectomy and antegrade endovascular intervention.  .  Patient admitted to ICU for postop monitoring, with right femoral wound VAC in place.  Plan for d/c ASA, Plavix, hep gtt   PAF (paroxysmal atrial fibrillation) (HCC)  Continue atenolol.  Anticoagulation: Apixaban on hold for anticipated vascular procedure, see plan for hep gtt above     Cardiomyopathy (HCC)  Last known LVEF 50% echocardiogram 2024    Disposition: Med Surg    ICU Core Measures     A: Assess, Prevent, and Manage Pain Has pain been assessed? Yes  Need for changes to pain regimen? No   B: Both SAT/SAT  N/A   C: Choice of Sedation RASS Goal: 0 Alert and Calm or N/A patient not on sedation  Need for changes to sedation or analgesia regimen? N/A   D: Delirium CAM-ICU: Negative   E: Early Mobility  Plan for early mobility? Yes   F: Family Engagement Plan for family engagement today? Yes       Antibiotic Review: Patient on appropriate coverage based on culture data.     Review of Invasive Devices:    Gaspar Plan: Gaspar to be removed. Order has been placed    Hope Plan: Discontinue arterial line    Prophylaxis:  VTE VTE covered by:  heparin, Intravenous       Stress Ulcer  covered byfamotidine (PEPCID) 20 mg tablet [[id number]] (Long-Term Med), famotidine (PEPCID) tablet 20 mg [[id number]]         24 Hour Events : see above    Subjective   Review of Systems: Review of Systems   Respiratory:  Negative for shortness of breath.    Cardiovascular:  Negative for chest pain.   Gastrointestinal:  Negative for abdominal pain.   Musculoskeletal:  Negative for back pain.   Neurological:  Negative for dizziness.       Objective :                   Vitals I/O      Most  Recent Min/Max in 24hrs   Temp 99.9 °F (37.7 °C) Temp  Min: 98.4 °F (36.9 °C)  Max: 99.9 °F (37.7 °C)   Pulse 78 Pulse  Min: 68  Max: 92   Resp 21 Resp  Min: 16  Max: 25   /59 BP  Min: 108/82  Max: 134/62   O2 Sat 94 % SpO2  Min: 92 %  Max: 100 %      Intake/Output Summary (Last 24 hours) at 5/14/2025 0134  Last data filed at 5/14/2025 0101  Gross per 24 hour   Intake 4755.76 ml   Output 3045 ml   Net 1710.76 ml       Diet [place]/CHO Controlled; Consistent Carbohydrate Diet Level 3 (6 carb servings/90 grams CHO/meal)    Invasive Monitoring   Arterial Line  Ravalli /74  Arterial Line BP  Min: 104/74  Max: 126/56   MAP 88 mmHg  Arterial Line MAP (mmHg)  Min: 70 mmHg  Max: 88 mmHg           Physical Exam   Physical Exam  Vitals reviewed.   Eyes:      General: Gaze aligned appropriately.   Skin:     General: Skin is warm.   HENT:      Head: Normocephalic and atraumatic.   Cardiovascular:      Rate and Rhythm: Normal rate and regular rhythm.   Musculoskeletal:      Cervical back: Normal range of motion.      Comments: R wound vac in place    Constitutional:       General: He is not in acute distress.     Appearance: He is not ill-appearing.   Pulmonary:      Effort: Pulmonary effort is normal. No respiratory distress.   Neurological:      General: No focal deficit present.      Mental Status: He is easily aroused.      Sensory: Sensation is intact.          Diagnostic Studies        Lab Results: I have reviewed the following results:     Medications:  Scheduled PRN   acetaminophen, 975 mg, Q8H SANDRA  amLODIPine, 5 mg, BID  atenolol, 25 mg, Daily  atorvastatin, 40 mg, QPM  Budeson-Glycopyrrol-Formoterol, 2 puff, BID  cefazolin, 2,000 mg, Q8H  clopidogrel, 75 mg, Daily  famotidine, 20 mg, BID  heparin, , Once  insulin glargine, 5 Units, HS  insulin lispro, 1-5 Units, TID AC  insulin lispro, 1-5 Units, HS  insulin lispro, 5 Units, TID With Meals  metroNIDAZOLE, 500 mg, Q12H  montelukast, 10 mg,  HS  multivitamin-minerals, 1 tablet, Daily  predniSONE, 5 mg, Daily  senna, 1 tablet, Daily      albuterol, 1 puff, Q4H PRN  lactated ringers, 500 mL, Once PRN   And  lactated ringers, 500 mL, Once PRN  morphine injection, 2 mg, Q4H PRN  ondansetron, 4 mg, Q6H PRN  oxyCODONE, 5 mg, Q4H PRN   Or  oxyCODONE, 10 mg, Q4H PRN  sodium chloride, 500 mL, Once PRN   And  sodium chloride, 500 mL, Once PRN       Continuous          Labs:   CBC    Recent Labs     05/13/25  0455 05/13/25  1512   WBC 7.93  --    HGB 10.8* 8.5*   HCT 32.7* 25*     --      BMP    Recent Labs     05/13/25  0455 05/13/25  1512   SODIUM 133*  --    K 4.2  --      --    CO2 26 22   AGAP 6  --    BUN 13  --    CREATININE 0.93  --    CALCIUM 8.7  --        Coags    Recent Labs     05/12/25  1305 05/13/25  0455   PTT 70* 28        Additional Electrolytes  Recent Labs     05/13/25  1512   CAIONIZED 1.09*          Blood Gas    No recent results  No recent results LFTs  Recent Labs     05/13/25  0455   ALT 22   AST 46*   ALKPHOS 62   ALB 3.1*   TBILI 0.33       Infectious  No recent results  Glucose  Recent Labs     05/13/25  0455   GLUC 206*

## 2025-05-14 NOTE — QUICK NOTE
Post Op Check Note - Vascular Surgery BONILLA Van 79 y.o. male MRN: [id number]  Unit/Bed#: ICU 02 Encounter: [id number]      Subjective:   80 yo male POD 0 s/p right femoral endarterectomy and antegrade endovascular intervention. Patient is doing well postop. Pain is well controlled. Denies N/V. No new complaints at this time.    Objective:     Vitals:    05/1945   BP: 113/58   Pulse: 76   Resp: 17   Temp: 98.8 °F (37.1 °C)   SpO2: 93%     Physical Exam:  General: Alert and oriented x 4  CV: RRR; no murmurs, gallops, or rubs  Resp: No SOB; lungs CTA - no wheezes, rales or rhonchi  Abd: BS x 4 quadrants, soft and nontender  RLE: Biphasic DP and PT signals. Sensation and motor function intact proximal and distal to incision site. Incision site soft, appropriately tender. No ecchymosis or erythema. CN grossly intact. Toes warm and pink.    Labs:  0   Lab Value Date/Time    HCT 25 (L) 05/13/2025 1512    HCT 32.7 (L) 05/13/2025 0455    HCT 31.7 (L) 05/11/2025 0549    HCT 30.2 (L) 05/10/2025 0608    HCT 38.0 03/06/2014 1241    HGB 8.5 (L) 05/13/2025 1512    HGB 10.8 (L) 05/13/2025 0455    HGB 10.2 (L) 05/11/2025 0549    HGB 9.7 (L) 05/10/2025 0608    HGB 13.4 03/06/2014 1241    INR 1.14 05/07/2025 0900    WBC 7.93 05/13/2025 0455    WBC 8.24 05/11/2025 0549    WBC 8.55 05/10/2025 0608    WBC 7.04 03/06/2014 1241    ESR 28 (H) 04/09/2025 0825    CRP 33.6 (H) 04/09/2025 0825     Meds:    Current Facility-Administered Medications:     acetaminophen (TYLENOL) tablet 975 mg, 975 mg, Oral, Q8H [facility], 975 mg at 05/13/25 0519    albuterol (PROVENTIL HFA,VENTOLIN HFA) inhaler 1 puff, 1 puff, Inhalation, Q4H PRN, Chantelle Warner MD    [START ON 5/14/2025] amLODIPine (NORVASC) tablet 5 mg, 5 mg, Oral, BID, BELGICA Salazar    atenolol (TENORMIN) tablet 25 mg, 25 mg, Oral, Daily, Chantelle Warner MD, 25 mg at 05/13/25 0830    atorvastatin (LIPITOR) tablet 40 mg, 40 mg, Oral, QPM, Chantelle Warner MD, 40  mg at 05/12/25 1700    Budeson-Glycopyrrol-Formoterol 160-9-4.8 MCG/ACT AERO 2 puff, 2 puff, Inhalation, BID, Chantelle Warner MD, 2 puff at 05/13/25 0828    ceFAZolin (ANCEF) IVPB (premix in dextrose) 2,000 mg 50 mL, 2,000 mg, Intravenous, Q8H, Chantelle Warner MD, Last Rate: 100 mL/hr at 05/13/25 0621, 2,000 mg at 05/13/25 0621    [START ON 5/14/2025] clopidogrel (PLAVIX) tablet 75 mg, 75 mg, Oral, Daily, Chantelle Warner MD    famotidine (PEPCID) tablet 20 mg, 20 mg, Oral, BID, Chantelle Warner MD, 20 mg at 05/13/25 0828    heparin (porcine) subcutaneous injection 5,000 Units, 5,000 Units, Subcutaneous, Q8H SANDRA, Chantelle Warner MD    insulin glargine (LANTUS) subcutaneous injection 5 Units 0.05 mL, 5 Units, Subcutaneous, HS, Chantelle Warner MD, 5 Units at 05/12/25 2130    insulin lispro (HumALOG/ADMELOG) 100 units/mL subcutaneous injection 1-5 Units, 1-5 Units, Subcutaneous, TID AC, 3 Units at 05/12/25 1658 **AND** Fingerstick Glucose (POCT), , , TID AC, Chantelle Warner MD    insulin lispro (HumALOG/ADMELOG) 100 units/mL subcutaneous injection 1-5 Units, 1-5 Units, Subcutaneous, HS, Chantelle Warner MD, 3 Units at 05/12/25 2132    insulin lispro (HumALOG/ADMELOG) 100 units/mL subcutaneous injection 5 Units, 5 Units, Subcutaneous, TID With Meals, Chantelle Warner MD, 5 Units at 05/12/25 1657    lactated ringers bolus 500 mL, 500 mL, Intravenous, Once PRN **AND** lactated ringers bolus 500 mL, 500 mL, Intravenous, Once PRN, Chantelle Warner MD    metroNIDAZOLE (FLAGYL) IVPB (premix) 500 mg 100 mL, 500 mg, Intravenous, Q12H, Chantelle Warner MD, Last Rate: 200 mL/hr at 05/13/25 0512, 500 mg at 05/13/25 0512    montelukast (SINGULAIR) tablet 10 mg, 10 mg, Oral, HS, Chantelle Warner MD, 10 mg at 05/12/25 2129    morphine injection 2 mg, 2 mg, Intravenous, Q4H PRN, Chantelle Warner MD    multivitamin-minerals (CENTRUM) tablet 1 tablet, 1 tablet, Oral, Daily, Chantelle Warner MD, 1 tablet at 05/13/25 0828    ondansetron (ZOFRAN) injection 4 mg,  4 mg, Intravenous, Q6H PRN, Chantelle Warner MD, 4 mg at 05/13/25 2005    oxyCODONE (ROXICODONE) IR tablet 5 mg, 5 mg, Oral, Q4H PRN, 5 mg at 05/1945 **OR** oxyCODONE (ROXICODONE) immediate release tablet 10 mg, 10 mg, Oral, Q4H PRN, Chantelle Warner MD, 10 mg at 05/12/25 2243    predniSONE tablet 5 mg, 5 mg, Oral, Daily, Chantelle Warner MD, 5 mg at 05/13/25 0828    [START ON 5/14/2025] senna (SENOKOT) tablet 8.6 mg, 1 tablet, Oral, Daily, Chantelle Warner MD    sodium chloride 0.9 % bolus 500 mL, 500 mL, Intravenous, Once PRN **AND** sodium chloride 0.9 % bolus 500 mL, 500 mL, Intravenous, Once PRN, Chantelle Warner MD    Assessment/Plan:   Assessment:   79 y.o.male post operative day 0 right femoral endarterectomy with antegrade angiogram and endovascular intervention. Patient tolerating pain. Neurovascularly intact.    Plan:  Incentive spirometry  A-line  Gaspar  Neurovascular checks   Restart DVT ppx after 6 hours post-op  Ice and analgesics  Will continue to assess for acute blood loss anemia  Trend labs   Monitor incision sites and drain output       Joe Lopez PA-C

## 2025-05-15 ENCOUNTER — ANESTHESIA (INPATIENT)
Dept: PERIOP | Facility: HOSPITAL | Age: 80
End: 2025-05-15
Payer: COMMERCIAL

## 2025-05-15 ENCOUNTER — TELEPHONE (OUTPATIENT)
Age: 80
End: 2025-05-15

## 2025-05-15 LAB
ANION GAP SERPL CALCULATED.3IONS-SCNC: 6 MMOL/L (ref 4–13)
APTT PPP: 47 SECONDS (ref 23–34)
APTT PPP: 52 SECONDS (ref 23–34)
APTT PPP: 53 SECONDS (ref 23–34)
APTT PPP: 70 SECONDS (ref 23–34)
BUN SERPL-MCNC: 18 MG/DL (ref 5–25)
CALCIUM SERPL-MCNC: 7.9 MG/DL (ref 8.4–10.2)
CHLORIDE SERPL-SCNC: 102 MMOL/L (ref 96–108)
CO2 SERPL-SCNC: 25 MMOL/L (ref 21–32)
CREAT SERPL-MCNC: 1.13 MG/DL (ref 0.6–1.3)
ERYTHROCYTE [DISTWIDTH] IN BLOOD BY AUTOMATED COUNT: 14.4 % (ref 11.6–15.1)
GFR SERPL CREATININE-BSD FRML MDRD: 61 ML/MIN/1.73SQ M
GLUCOSE SERPL-MCNC: 228 MG/DL (ref 65–140)
GLUCOSE SERPL-MCNC: 261 MG/DL (ref 65–140)
GLUCOSE SERPL-MCNC: 276 MG/DL (ref 65–140)
GLUCOSE SERPL-MCNC: 332 MG/DL (ref 65–140)
GLUCOSE SERPL-MCNC: 445 MG/DL (ref 65–140)
HCT VFR BLD AUTO: 22.5 % (ref 36.5–49.3)
HGB BLD-MCNC: 7.4 G/DL (ref 12–17)
MCH RBC QN AUTO: 29.7 PG (ref 26.8–34.3)
MCHC RBC AUTO-ENTMCNC: 32.9 G/DL (ref 31.4–37.4)
MCV RBC AUTO: 90 FL (ref 82–98)
PLATELET # BLD AUTO: 197 THOUSANDS/UL (ref 149–390)
PMV BLD AUTO: 9.2 FL (ref 8.9–12.7)
POTASSIUM SERPL-SCNC: 4.2 MMOL/L (ref 3.5–5.3)
RBC # BLD AUTO: 2.49 MILLION/UL (ref 3.88–5.62)
SODIUM SERPL-SCNC: 133 MMOL/L (ref 135–147)
WBC # BLD AUTO: 10.43 THOUSAND/UL (ref 4.31–10.16)

## 2025-05-15 PROCEDURE — 97530 THERAPEUTIC ACTIVITIES: CPT

## 2025-05-15 PROCEDURE — 80048 BASIC METABOLIC PNL TOTAL CA: CPT | Performed by: INTERNAL MEDICINE

## 2025-05-15 PROCEDURE — 82948 REAGENT STRIP/BLOOD GLUCOSE: CPT

## 2025-05-15 PROCEDURE — 85730 THROMBOPLASTIN TIME PARTIAL: CPT | Performed by: INTERNAL MEDICINE

## 2025-05-15 PROCEDURE — 85027 COMPLETE CBC AUTOMATED: CPT | Performed by: INTERNAL MEDICINE

## 2025-05-15 PROCEDURE — 99232 SBSQ HOSP IP/OBS MODERATE 35: CPT | Performed by: INTERNAL MEDICINE

## 2025-05-15 PROCEDURE — NC001 PR NO CHARGE: Performed by: SURGERY

## 2025-05-15 RX ORDER — INSULIN GLARGINE 100 [IU]/ML
20 INJECTION, SOLUTION SUBCUTANEOUS
Status: DISCONTINUED | OUTPATIENT
Start: 2025-05-15 | End: 2025-05-16

## 2025-05-15 RX ADMIN — FAMOTIDINE 20 MG: 20 TABLET, FILM COATED ORAL at 08:41

## 2025-05-15 RX ADMIN — INSULIN GLARGINE 20 UNITS: 100 INJECTION, SOLUTION SUBCUTANEOUS at 21:49

## 2025-05-15 RX ADMIN — BUDESONIDE, GLYCOPYRROLATE, AND FORMOTEROL FUMARATE 2 PUFF: 160; 9; 4.8 AEROSOL, METERED RESPIRATORY (INHALATION) at 21:48

## 2025-05-15 RX ADMIN — CEFAZOLIN SODIUM 2000 MG: 2 SOLUTION INTRAVENOUS at 07:03

## 2025-05-15 RX ADMIN — PREDNISONE 5 MG: 5 TABLET ORAL at 08:40

## 2025-05-15 RX ADMIN — LISINOPRIL 20 MG: 20 TABLET ORAL at 21:46

## 2025-05-15 RX ADMIN — INSULIN LISPRO 5 UNITS: 100 INJECTION, SOLUTION INTRAVENOUS; SUBCUTANEOUS at 16:38

## 2025-05-15 RX ADMIN — ACETAMINOPHEN 975 MG: 325 TABLET, FILM COATED ORAL at 21:47

## 2025-05-15 RX ADMIN — CEFAZOLIN SODIUM 2000 MG: 2 SOLUTION INTRAVENOUS at 23:19

## 2025-05-15 RX ADMIN — BUDESONIDE, GLYCOPYRROLATE, AND FORMOTEROL FUMARATE 2 PUFF: 160; 9; 4.8 AEROSOL, METERED RESPIRATORY (INHALATION) at 08:44

## 2025-05-15 RX ADMIN — CEFAZOLIN SODIUM 2000 MG: 2 SOLUTION INTRAVENOUS at 14:09

## 2025-05-15 RX ADMIN — ATORVASTATIN CALCIUM 40 MG: 40 TABLET, FILM COATED ORAL at 17:23

## 2025-05-15 RX ADMIN — METRONIDAZOLE 500 MG: 500 INJECTION, SOLUTION INTRAVENOUS at 16:38

## 2025-05-15 RX ADMIN — CLOPIDOGREL BISULFATE 75 MG: 75 TABLET, FILM COATED ORAL at 08:41

## 2025-05-15 RX ADMIN — INSULIN LISPRO 3 UNITS: 100 INJECTION, SOLUTION INTRAVENOUS; SUBCUTANEOUS at 21:48

## 2025-05-15 RX ADMIN — Medication 1 TABLET: at 08:41

## 2025-05-15 RX ADMIN — INSULIN LISPRO 5 UNITS: 100 INJECTION, SOLUTION INTRAVENOUS; SUBCUTANEOUS at 11:44

## 2025-05-15 RX ADMIN — AMLODIPINE BESYLATE 5 MG: 5 TABLET ORAL at 21:47

## 2025-05-15 RX ADMIN — HEPARIN SODIUM 2000 UNITS: 1000 INJECTION, SOLUTION INTRAVENOUS; SUBCUTANEOUS at 17:22

## 2025-05-15 RX ADMIN — SENNOSIDES 8.6 MG: 8.6 TABLET, FILM COATED ORAL at 08:41

## 2025-05-15 RX ADMIN — INSULIN LISPRO 4 UNITS: 100 INJECTION, SOLUTION INTRAVENOUS; SUBCUTANEOUS at 11:44

## 2025-05-15 RX ADMIN — HEPARIN SODIUM 2000 UNITS: 1000 INJECTION, SOLUTION INTRAVENOUS; SUBCUTANEOUS at 11:19

## 2025-05-15 RX ADMIN — FAMOTIDINE 20 MG: 20 TABLET, FILM COATED ORAL at 17:23

## 2025-05-15 RX ADMIN — INSULIN LISPRO 2 UNITS: 100 INJECTION, SOLUTION INTRAVENOUS; SUBCUTANEOUS at 08:44

## 2025-05-15 RX ADMIN — METRONIDAZOLE 500 MG: 500 INJECTION, SOLUTION INTRAVENOUS at 04:19

## 2025-05-15 RX ADMIN — LIDOCAINE 1 PATCH: 50 PATCH CUTANEOUS at 23:18

## 2025-05-15 RX ADMIN — METFORMIN ER 500 MG 500 MG: 500 TABLET ORAL at 16:38

## 2025-05-15 RX ADMIN — ACETAMINOPHEN 975 MG: 325 TABLET, FILM COATED ORAL at 14:11

## 2025-05-15 RX ADMIN — MONTELUKAST 10 MG: 10 TABLET, FILM COATED ORAL at 21:47

## 2025-05-15 RX ADMIN — ASPIRIN 81 MG: 81 TABLET, CHEWABLE ORAL at 08:40

## 2025-05-15 RX ADMIN — HEPARIN SODIUM 2000 UNITS: 1000 INJECTION, SOLUTION INTRAVENOUS; SUBCUTANEOUS at 23:44

## 2025-05-15 RX ADMIN — INSULIN LISPRO 5 UNITS: 100 INJECTION, SOLUTION INTRAVENOUS; SUBCUTANEOUS at 08:44

## 2025-05-15 RX ADMIN — HEPARIN SODIUM 20 UNITS/KG/HR: 10000 INJECTION, SOLUTION INTRAVENOUS at 00:17

## 2025-05-15 RX ADMIN — ACETAMINOPHEN 975 MG: 325 TABLET, FILM COATED ORAL at 05:27

## 2025-05-15 RX ADMIN — HEPARIN SODIUM 20 UNITS/KG/HR: 10000 INJECTION, SOLUTION INTRAVENOUS at 16:47

## 2025-05-15 NOTE — ASSESSMENT & PLAN NOTE
Lab Results   Component Value Date    HGBA1C 9.3 (H) 04/09/2025     Recent Labs     05/14/25  1558 05/14/25  2131 05/15/25  0810 05/15/25  1140   POCGLU 324* 285* 261* 332*     Prior to admission glipizide linagliptin and metformin  A1c 9.3.  Endocrinology consulted by vascular surgery  Started on glargine 10 units of lispro 5 units 3 times daily.  Further increase glargine to 20 units

## 2025-05-15 NOTE — ASSESSMENT & PLAN NOTE
History of diabetes mellitus paroxysmal atrial fibrillation asthma cardiomyopathy and PAD presented to [facility] with right foot wound  Met sepsis criteria at [facility]  Currently on cefazolin/metronidazole  Blood cultures no growth 5 days  Transferred here for vascular surgery.  Podiatry planning on right partial fifth ray resection tomorrow

## 2025-05-15 NOTE — PHYSICAL THERAPY NOTE
PHYSICAL THERAPY NOTE          Patient Name: Gold Van  Today's Date: 5/15/2025       05/15/25 1137   PT Last Visit   PT Visit Date 05/15/25   Note Type   Note Type Treatment   Pain Assessment   Pain Assessment Tool 0-10   Pain Score 5   Pain Location/Orientation Location: Abdomen;Orientation: Right;Location: Foot   Hospital Pain Intervention(s) Repositioned;Ambulation/increased activity;Elevated;Emotional support;Rest   Restrictions/Precautions   Weight Bearing Precautions Per Order No   RLE Weight Bearing Per Order WBAT   Other Precautions Multiple lines;Fall Risk;Pain   General   Chart Reviewed Yes   Additional Pertinent History plans for 5th ray resection on Friday 5/16   Response to Previous Treatment Patient with no complaints from previous session.   Cognition   Overall Cognitive Status WFL   Arousal/Participation Cooperative   Transfers   Sit to Stand 5  Supervision   Additional items Armrests;Increased time required   Stand to Sit 5  Supervision   Additional items Armrests;Increased time required   Ambulation/Elevation   Gait pattern Improper Weight shift;Decreased foot clearance;Short stride;Excessively slow;Wide KADIE   Gait Assistance 5  Supervision   Additional items Assist x 1   Assistive Device Rolling walker;SPC   Distance 70' (RW>SPC)   Balance   Static Standing Fair   Dynamic Standing Fair -   Ambulatory Fair -   Endurance Deficit   Endurance Deficit No   Activity Tolerance   Activity Tolerance Patient limited by pain   Assessment   Prognosis Fair   Problem List Impaired balance;Decreased mobility;Pain   Assessment Scooter was seen for a f/u session now POD#2 R CFA/SFA endarterectomy w/ bovine pericardial patch, DCB and stenting of SFA, and angio of AT. Reports discomfort in RLE however tolerated session fair. Demonstrates progress in terms of walking distance and progression to less restrictive/baseline AD. Ambulating  household distances without difficulty. Attempted to assess ambulation w limited WB to R in anticipation of upcoming podiatry surgery; pt able to WB to R heel fairly. Would likely have difficulty maintaining more restrictive WBS such as NWB. Will continue to monitor function post operatively and update POC as appropriate.   Barriers to Discharge None   Barriers to Discharge Comments at current LOF/wbs   Goals   STG Expiration Date 05/26/25   Short Term Goal #1 Goals to be met in 14 days; pt will be able to: 1) inc strength & balance by 1/2 grade to improve overall functional mobility & dec fall risk; 2) inc bed mobility to I for pt to be able to get in/OOB safely w/ proper techniques 100% of the time, to dec caregiver burden & safely function at home; 3) inc transfers to I for pt to transition safely from one surface to another w/o % of the time, to dec caregiver burden & safely function at home; 4) inc amb w/ RW approx. >250' w/ modified I for pt to ambulate community distances w/o any % of the time, to dec caregiver burden & safely function at home; 5) negotiate stairs w/ modified I for inc safety during stair mgt inside/outside of home & dec caregiver burden; 6) pt/caregiver ed   PT Treatment Day 1   Plan   Treatment/Interventions Functional transfer training;LE strengthening/ROM;Elevations;Therapeutic exercise;Equipment eval/education;Bed mobility;Patient/family training;Gait training;Compensatory technique education;Continued evaluation;OT   Progress Progressing toward goals   PT Frequency 2-3x/wk   Discharge Recommendation   Rehab Resource Intensity Level, PT (S)  III (Minimum Resource Intensity)  (will re-evaluate post operatively)   AM-PAC Basic Mobility Inpatient   Turning in Flat Bed Without Bedrails 3   Lying on Back to Sitting on Edge of Flat Bed Without Bedrails 3   Moving Bed to Chair 3   Standing Up From Chair Using Arms 3   Walk in Room 3   Climb 3-5 Stairs With Railing 3   Basic  Mobility Inpatient Raw Score 18   Basic Mobility Standardized Score 41.05   [facility] Highest Level Of Mobility   -HLM Goal 6: Walk 10 steps or more   -HLM Achieved 7: Walk 25 feet or more   Education   Education Provided Mobility training   Patient Reinforcement needed   End of Consult   Patient Position at End of Consult Bedside chair;All needs within reach  (no chair alarm on patient prior to session)     Saray Price, PT

## 2025-05-15 NOTE — TELEPHONE ENCOUNTER
Caller: Mya Van     Doctor and/or Office: Dr. Galeas/Dr. Brewer doing his SX as Dr. Adal CARVAJAL    #: [phone]    Escalation: Surgery/I did LM for you about this issue-he was told he was having Sx today w/ Dr. Brewer but then a nurse came in and told him it was CX/no explanation. His wife is worried as there seems to be a disconnect between the staff and Drs about her husbands care. She is asking for call back to verify he is not having Sx today, and the reason. She is left to wonder if something went wrong with the vascular Sx-they told her he was cleared by them for the Sx, but yet it was Cx, no reason given. I did see Sx for 5/16/25 in his appt. Desk. Please call her back and advise. Thanks

## 2025-05-15 NOTE — PROGRESS NOTES
Progress Note - Hospitalist   Name: Gold Van 79 y.o. male I MRN: [id number]  Unit/Bed#: [place] [address] -02 I Date of Admission: 5/11/2025   Date of Service: 5/15/2025 I Hospital Day: 4    Assessment & Plan  Sepsis without acute organ dysfunction ([facility])  History of diabetes mellitus paroxysmal atrial fibrillation asthma cardiomyopathy and PAD presented to [facility] with right foot wound  Met sepsis criteria at [facility]  Currently on cefazolin/metronidazole  Blood cultures no growth 5 days  Transferred here for vascular surgery.  Podiatry planning on right partial fifth ray resection tomorrow  Severe peripheral arterial disease (HCC)  Transferred here for vascular surgery intervention  Underwent right CFA SFA endarterectomy/stenting 5/13/2025  Aspirin discontinued.  Started on clopidogrel for stents  Type 2 diabetes mellitus with complication, without long-term current use of insulin ([facility])  Lab Results   Component Value Date    HGBA1C 9.3 (H) 04/09/2025     Recent Labs     05/14/25  1558 05/14/25  2131 05/15/25  0810 05/15/25  1140   POCGLU 324* 285* 261* 332*     Prior to admission glipizide linagliptin and metformin  A1c 9.3.  Endocrinology consulted by vascular surgery  Started on glargine 10 units of lispro 5 units 3 times daily.  Further increase glargine to 20 units  PAF (paroxysmal atrial fibrillation) ([facility])  Continue atenolol.  Anticoagulation: Apixaban on hold for anticipated vascular procedure  On heparin infusion  Essential hypertension  Continue amlodipine atenolol and lisinopril  Mild intermittent asthma without complication  Prior to admission on [facility].  No exacerbation  Pruritus  Chronic pruritus follows with dermatology as an outpatient on prednisone 5 mg daily  Cardiomyopathy (HCC)  Last known LVEF 50% echocardiogram 2024  Currently compensated.  Will discontinue IV fluids    VTE Pharmacologic Prophylaxis: VTE Score: 5 High Risk (Score >/= 5) - Pharmacological DVT Prophylaxis Ordered:  heparin drip. Sequential Compression Devices Ordered.    Mobility:   Basic Mobility Inpatient Raw Score: 18  JH-HLM Goal: 6: Walk 10 steps or more  JH-HLM Achieved: 7: Walk 25 feet or more  JH-HLM Goal achieved. Continue to encourage appropriate mobility.    Patient Centered Rounds: I have performed bedside rounds with nursing staff today.  Discussions with Specialists or Other Care Team Provider: Case management and vascular surgery    Education and Discussions with Family / Patient: Updated  (wife) via phone.    Current Length of Stay: 4 day(s)  Current Patient Status: Inpatient   Certification Statement: The patient will continue to require additional inpatient hospital stay due to podiatry surgery tomorrow  Discharge Plan: Anticipate discharge in 48-72 hrs to home.    Code Status: Level 1 - Full Code    Subjective   Patient seen and examined.  No chest pain or shortness of breath.    Objective   Vitals:   Temp (24hrs), Av.6 °F (37 °C), Min:97.9 °F (36.6 °C), Max:99.2 °F (37.3 °C)    Temp:  [97.9 °F (36.6 °C)-99.2 °F (37.3 °C)] 98.2 °F (36.8 °C)  HR:  [75-96] 96  Resp:  [19-31] 19  BP: (107-131)/(55-66) 131/64  SpO2:  [90 %-96 %] 95 %  Body mass index is 22.48 kg/m².     Input and Output Summary (last 24 hours):     Intake/Output Summary (Last 24 hours) at 5/15/2025 1338  Last data filed at 5/15/2025 0926  Gross per 24 hour   Intake 1447.48 ml   Output 1375 ml   Net 72.48 ml       Physical Exam  Vitals reviewed.   Constitutional:       General: He is not in acute distress.  HENT:      Head: Atraumatic.     Eyes:      Extraocular Movements: Extraocular movements intact.       Cardiovascular:      Rate and Rhythm: Regular rhythm.   Pulmonary:      Effort: Pulmonary effort is normal.      Breath sounds: Decreased breath sounds present. No wheezing.   Abdominal:      General: Bowel sounds are normal.      Palpations: Abdomen is soft.      Tenderness: There is no abdominal tenderness.      Musculoskeletal:      Comments: VAC right lower extremity     Skin:     General: Skin is warm and dry.     Neurological:      General: No focal deficit present.      Mental Status: He is alert and oriented to person, place, and time.      Cranial Nerves: No cranial nerve deficit.     Psychiatric:         Mood and Affect: Mood normal.       Lines/Drains:  Invasive Devices       Peripheral Intravenous Line  Duration             Peripheral IV 05/13/25 Left;Ventral (anterior) Forearm 2 days    Peripheral IV 05/13/25 Right;Ventral (anterior) Forearm 2 days                            Lab Results: I have reviewed the following results:   Results from last 7 days   Lab Units 05/15/25  0433 05/14/25  0436 05/13/25  1512 05/13/25  0455   WBC Thousand/uL 10.43* 11.03*  --  7.93   HEMOGLOBIN g/dL 7.4* 8.4*  --  10.8*   I STAT HEMOGLOBIN g/dl  --   --  8.5*  --    PLATELETS Thousands/uL 197 222  --  256   MCV fL 90 91  --  88     Results from last 7 days   Lab Units 05/15/25  0445 05/14/25  0436 05/13/25  1512 05/13/25  0455   SODIUM mmol/L 133* 134*  --  133*   POTASSIUM mmol/L 4.2 4.6  --  4.2   CHLORIDE mmol/L 102 104  --  101   CO2 mmol/L 25 23  --  26   CO2, I-STAT mmol/L  --   --  22  --    ANION GAP mmol/L 6 7  --  6   BUN mg/dL 18 14  --  13   CREATININE mg/dL 1.13 1.13  --  0.93   CALCIUM mg/dL 7.9* 8.1*  --  8.7   ALBUMIN g/dL  --  2.9*  --  3.1*   TOTAL BILIRUBIN mg/dL  --  0.38  --  0.33   ALK PHOS U/L  --  52  --  62   ALT U/L  --  15  --  22   AST U/L  --  31  --  46*   EGFR ml/min/1.73sq m 61 61  --  77   GLUCOSE RANDOM mg/dL 228* 242*  --  206*     Results from last 7 days   Lab Units 05/14/25  0436   MAGNESIUM mg/dL 2.1   PHOSPHORUS mg/dL 4.0                      Results from last 7 days   Lab Units 05/15/25  1140 05/15/25  0810 05/14/25  2131 05/14/25  1558 05/14/25  1021 05/14/25  0732 05/13/25  2227 05/13/25  2154 05/13/25  1852 05/13/25  0738 05/12/25  2047 05/12/25  1557   POC GLUCOSE mg/dl 332* 261*  285* 324* 220* 210* 286* 269* 239* 188* 274* 329*               Recent Cultures (last 7 days):         Imaging:  Reviewed radiology reports from this admission including:  No results found.    Last 24 Hours Medication List:     Current Facility-Administered Medications:     acetaminophen (TYLENOL) tablet 975 mg, Q8H SANDRA    albuterol (PROVENTIL HFA,VENTOLIN HFA) inhaler 1 puff, Q4H PRN    amLODIPine (NORVASC) tablet 5 mg, BID    aspirin chewable tablet 81 mg, Daily    atenolol (TENORMIN) tablet 25 mg, Daily    atorvastatin (LIPITOR) tablet 40 mg, QPM    Budeson-Glycopyrrol-Formoterol 160-9-4.8 MCG/ACT AERO 2 puff, BID    ceFAZolin (ANCEF) IVPB (premix in dextrose) 2,000 mg 50 mL, Q8H, Last Rate: 2,000 mg (05/15/25 0703)    clopidogrel (PLAVIX) tablet 75 mg, Daily    famotidine (PEPCID) tablet 20 mg, BID    heparin (porcine) 25,000 units in 0.45% NaCl 250 mL infusion (premix), Titrated, Last Rate: 20 Units/kg/hr (05/15/25 1119)    heparin (porcine) injection 2,000 Units, Q6H PRN    heparin (porcine) injection 4,000 Units, Q6H PRN    insulin glargine (LANTUS) subcutaneous injection 10 Units 0.1 mL, HS    insulin lispro (HumALOG/ADMELOG) 100 units/mL subcutaneous injection 1-5 Units, TID AC **AND** Fingerstick Glucose (POCT), TID AC    insulin lispro (HumALOG/ADMELOG) 100 units/mL subcutaneous injection 1-5 Units, HS    insulin lispro (HumALOG/ADMELOG) 100 units/mL subcutaneous injection 5 Units, TID With Meals    lactated ringers infusion, Continuous, Last Rate: Stopped (05/15/25 0708)    lidocaine (LIDODERM) 5 % patch 1 patch, Daily    lisinopril (ZESTRIL) tablet 20 mg, BID    metFORMIN (GLUCOPHAGE-XR) 24 hr tablet 500 mg, Daily With Dinner    metroNIDAZOLE (FLAGYL) IVPB (premix) 500 mg 100 mL, Q12H, Last Rate: 500 mg (05/15/25 0419)    montelukast (SINGULAIR) tablet 10 mg, HS    montelukast (SINGULAIR) tablet 10 mg, HS    morphine injection 2 mg, Q4H PRN    multivitamin-minerals (CENTRUM) tablet 1 tablet, Daily     ondansetron (ZOFRAN) injection 4 mg, Q6H PRN    oxyCODONE (ROXICODONE) IR tablet 5 mg, Q4H PRN **OR** oxyCODONE (ROXICODONE) immediate release tablet 10 mg, Q4H PRN    predniSONE tablet 5 mg, Daily    senna (SENOKOT) tablet 8.6 mg, Daily    Administrative Statements   Today, Patient Was Seen By: Bean Sweeney, DO  I have spent a total time of 35 minutes in caring for this patient on the day of the visit/encounter including Patient and family education, Counseling / Coordination of care, Documenting in the medical record, Reviewing/placing orders in the medical record (including tests, medications, and/or procedures), and Communicating with other healthcare professionals .    **Please Note: This note may have been constructed using a voice recognition system.**

## 2025-05-15 NOTE — ASSESSMENT & PLAN NOTE
Transferred here for vascular surgery intervention  Underwent right CFA SFA endarterectomy/stenting 5/13/2025  Aspirin discontinued.  Started on clopidogrel for stents

## 2025-05-15 NOTE — PLAN OF CARE
Problem: PAIN - ADULT  Goal: Verbalizes/displays adequate comfort level or baseline comfort level  Description: Interventions:- Encourage patient to monitor pain and request assistance- Assess pain using appropriate pain scale- Administer analgesics based on type and severity of pain and evaluate response- Implement non-pharmacological measures as appropriate and evaluate response- Consider cultural and social influences on pain and pain management- Notify physician/advanced practitioner if interventions unsuccessful or patient reports new pain  Outcome: Progressing     Problem: DISCHARGE PLANNING  Goal: Discharge to home or other facility with appropriate resources  Description: INTERVENTIONS:- Identify barriers to discharge w/patient and caregiver- Arrange for needed discharge resources and transportation as appropriate- Identify discharge learning needs (meds, wound care, etc.)- Arrange for interpretive services to assist at discharge as needed- Refer to Case Management Department for coordinating discharge planning if the patient needs post-hospital services based on physician/advanced practitioner order or complex needs related to functional status, cognitive ability, or social support system  Outcome: Progressing     Problem: INFECTION - ADULT  Goal: Absence or prevention of progression during hospitalization  Description: INTERVENTIONS:  - Assess and monitor for signs and symptoms of infection  - Monitor lab/diagnostic results  - Monitor all insertion sites, i.e. indwelling lines, tubes, and drains  - Monitor endotracheal if appropriate and nasal secretions for changes in amount and color  - [place] appropriate cooling/warming therapies per order  - Administer medications as ordered  - Instruct and encourage patient and family to use good hand hygiene technique  - Identify and instruct in appropriate isolation precautions for identified infection/condition  Outcome: Progressing  Goal: Absence of  fever/infection during neutropenic period  Description: INTERVENTIONS:  - Monitor WBC    Outcome: Progressing     Problem: SAFETY ADULT  Goal: Patient will remain free of falls  Description: INTERVENTIONS:  - Educate patient/family on patient safety including physical limitations  - Instruct patient to call for assistance with activity   - Consult OT/PT to assist with strengthening/mobility   - Keep Call bell within reach  - Keep bed low and locked with side rails adjusted as appropriate  - Keep care items and personal belongings within reach  - Initiate and maintain comfort rounds  - Make Fall Risk Sign visible to staff  - Offer Toileting every 2 Hours, in advance of need  - Initiate/Maintain bed alarm  - Obtain necessary fall risk management equipment:   - Apply yellow socks and bracelet for high fall risk patients  - Consider moving patient to room near nurses station  Outcome: Progressing  Goal: Maintain or return to baseline ADL function  Description: INTERVENTIONS:  -  Assess patient's ability to carry out ADLs; assess patient's baseline for ADL function and identify physical deficits which impact ability to perform ADLs (bathing, care of mouth/teeth, toileting, grooming, dressing, etc.)  - Assess/evaluate cause of self-care deficits   - Assess range of motion  - Assess patient's mobility; develop plan if impaired  - Assess patient's need for assistive devices and provide as appropriate  - Encourage maximum independence but intervene and supervise when necessary  - Involve family in performance of ADLs  - Assess for home care needs following discharge   - Consider OT consult to assist with ADL evaluation and planning for discharge  - Provide patient education as appropriate  Outcome: Progressing  Goal: Maintains/Returns to pre admission functional level  Description: INTERVENTIONS:  - Perform AM-PAC 6 Click Basic Mobility/ Daily Activity assessment daily.  - Set and communicate daily mobility goal to care team  and patient/family/caregiver.   - Collaborate with rehabilitation services on mobility goals if consulted  - Perform Range of Motion 4 times a day.  - Reposition patient every 3 hours.  - Dangle patient 3 times a day  - Stand patient 3 times a day  - Ambulate patient 3 times a day  - Out of bed to chair 3 times a day   - Out of bed for meals 3 times a day  - Out of bed for toileting  - Record patient progress and toleration of activity level   Outcome: Progressing     Problem: SKIN/TISSUE INTEGRITY - ADULT  Goal: Skin Integrity remains intact(Skin Breakdown Prevention)  Description: Assess:-Perform Sae assessment -Clean and moisturize skin -Inspect skin when repositioning, toileting, and assisting with ADLS-Assess under medical devices -Assess extremities for adequate circulation and sensation Bed Management:-Have minimal linens on bed & keep smooth, unwrinkled-Change linens as needed when moist or perspiring-Avoid sitting or lying in one position for more than 2 hours while in bed-Keep HOB at 30 degrees Toileting:-Offer bedside commode-Assess for incontinence -Use incontinent care products after each incontinent episode Activity:-Mobilize patient 3 times a day-Encourage activity and walks on unit-Encourage or provide ROM exercises -Turn and reposition patient every 2 Hours-Use appropriate equipment to lift or move patient in bed-Instruct/ Assist with weight shifting  when out of bed in chair-Consider limitation of chair time 2 hour intervalsSkin Care:-Avoid use of baby powder, tape, friction and shearing, hot water or constrictive clothing-Relieve pressure over bony prominences Do not massage red bony areasNext Steps:-Teach patient strategies to minimize risks Consider consults to  interdisciplinary teams   Outcome: Progressing  Goal: Incision(s), wounds(s) or drain site(s) healing without S/S of infection  Description: INTERVENTIONS- Assess and document dressing, incision, wound bed, drain sites and surrounding  tissue- Provide patient and family education- Perform skin care/dressing changes every other day  Outcome: Progressing  Goal: Pressure injury heals and does not worsen  Description: Interventions:- Implement low air loss mattress or specialty surface (Criteria met)- Apply silicone foam dressing- Instruct/assist with weight shifting every 90 minutes when in chair - Limit chair time to 2 hour intervals- Use special pressure reducing interventions such as Q 2 turns when in chair - Apply fecal or [id number] incontinence containment device - Perform passive or active ROM every - Turn and reposition patient & offload bony prominences every 2 hours - Utilize friction reducing device or surface for transfers - Consider consults to  interdisciplinary teams such as - Use incontinent care products after each incontinent episode such as - Consider nutrition services referral as needed  Outcome: Progressing

## 2025-05-15 NOTE — TELEPHONE ENCOUNTER
Ok ty for your fast response. Im not sure if there was any recent communication to the patients wife as she called into the office and Dr Galeas is out.

## 2025-05-15 NOTE — PLAN OF CARE
Problem: PAIN - ADULT  Goal: Verbalizes/displays adequate comfort level or baseline comfort level  Description: Interventions:- Encourage patient to monitor pain and request assistance- Assess pain using appropriate pain scale- Administer analgesics based on type and severity of pain and evaluate response- Implement non-pharmacological measures as appropriate and evaluate response- Consider cultural and social influences on pain and pain management- Notify physician/advanced practitioner if interventions unsuccessful or patient reports new pain  Outcome: Progressing     Problem: DISCHARGE PLANNING  Goal: Discharge to home or other facility with appropriate resources  Description: INTERVENTIONS:- Identify barriers to discharge w/patient and caregiver- Arrange for needed discharge resources and transportation as appropriate- Identify discharge learning needs (meds, wound care, etc.)- Arrange for interpretive services to assist at discharge as needed- Refer to Case Management Department for coordinating discharge planning if the patient needs post-hospital services based on physician/advanced practitioner order or complex needs related to functional status, cognitive ability, or social support system  Outcome: Progressing     Problem: SKIN/TISSUE INTEGRITY - ADULT  Goal: Skin Integrity remains intact(Skin Breakdown Prevention)  Description: Assess:-Perform Sae assessment -Clean and moisturize skin -Inspect skin when repositioning, toileting, and assisting with ADLS-Assess under medical devices -Assess extremities for adequate circulation and sensation Bed Management:-Have minimal linens on bed & keep smooth, unwrinkled-Change linens as needed when moist or perspiring-Avoid sitting or lying in one position for more than 2 hours while in bed-Keep HOB at 30 degrees Toileting:-Offer bedside commode-Assess for incontinence -Use incontinent care products after each incontinent episode Activity:-Mobilize patient 3 times  a day-Encourage activity and walks on unit-Encourage or provide ROM exercises -Turn and reposition patient every 2 Hours-Use appropriate equipment to lift or move patient in bed-Instruct/ Assist with weight shifting  when out of bed in chair-Consider limitation of chair time 2 hour intervalsSkin Care:-Avoid use of baby powder, tape, friction and shearing, hot water or constrictive clothing-Relieve pressure over bony prominences Do not massage red bony areasNext Steps:-Teach patient strategies to minimize risks Consider consults to  interdisciplinary teams   Outcome: Progressing  Goal: Incision(s), wounds(s) or drain site(s) healing without S/S of infection  Description: INTERVENTIONS- Assess and document dressing, incision, wound bed, drain sites and surrounding tissue- Provide patient and family education- Perform skin care/dressing changes every shift  Outcome: Progressing  Goal: Pressure injury heals and does not worsen  Description: Interventions:- Implement low air loss mattress or specialty surface (Criteria met)- Apply silicone foam dressing- Instruct/assist with weight shifting every 120 minutes when in chair - Limit chair time to 2 hour intervals- Use special pressure reducing interventions such as turning when in chair - Apply fecal or urinary incontinence containment device - Perform passive or active ROM every 2 hours- Turn and reposition patient & offload bony prominences every 2 hours - Utilize friction reducing device or surface for transfers - Consider consults to  interdisciplinary teams such as wound- Use incontinent care products after each incontinent episode such as wipes- Consider nutrition services referral as needed  Outcome: Progressing     Problem: Nutrition/Hydration-ADULT  Goal: Nutrient/Hydration intake appropriate for improving, restoring or maintaining nutritional needs  Description: Monitor and assess patient's nutrition/hydration status for malnutrition. Collaborate with  interdisciplinary team and initiate plan and interventions as ordered.  Monitor patient's weight and dietary intake as ordered or per policy. Utilize nutrition screening tool and intervene as necessary. Determine patient's food preferences and provide high-protein, high-caloric foods as appropriate. INTERVENTIONS:- Monitor oral intake, urinary output, labs, and treatment plans- Assess nutrition and hydration status and recommend course of action- Evaluate amount of meals eaten- Assist patient with eating if necessary - Allow adequate time for meals- Recommend/ encourage appropriate diets, oral nutritional supplements, and vitamin/mineral supplements- Order, calculate, and assess calorie counts as needed- Recommend, monitor, and adjust tube feedings and TPN/PPN based on assessed needs- Assess need for intravenous fluids- Provide specific nutrition/hydration education as appropriate- Include patient/family/caregiver in decisions related to nutrition  Outcome: Progressing     Problem: Prexisting or High Potential for Compromised Skin Integrity  Goal: Skin integrity is maintained or improved  Description: INTERVENTIONS:  - Identify patients at risk for skin breakdown  - Assess and monitor skin integrity  - Assess and monitor nutrition and hydration status  - Monitor labs   - Assess for incontinence   - Turn and reposition patient  - Assist with mobility/ambulation  - Relieve pressure over bony prominences  - Avoid friction and shearing  - Provide appropriate hygiene as needed including keeping skin clean and dry  - Evaluate need for skin moisturizer/barrier cream  - Collaborate with interdisciplinary team   - Patient/family teaching  - Consider wound care consult   Outcome: Progressing     Problem: INFECTION - ADULT  Goal: Absence or prevention of progression during hospitalization  Description: INTERVENTIONS:  - Assess and monitor for signs and symptoms of infection  - Monitor lab/diagnostic results  - Monitor all  insertion sites, i.e. indwelling lines, tubes, and drains  - Monitor endotracheal if appropriate and nasal secretions for changes in amount and color  - [place] appropriate cooling/warming therapies per order  - Administer medications as ordered  - Instruct and encourage patient and family to use good hand hygiene technique  - Identify and instruct in appropriate isolation precautions for identified infection/condition  Outcome: Progressing  Goal: Absence of fever/infection during neutropenic period  Description: INTERVENTIONS:  - Monitor WBC    Outcome: Progressing     Problem: SAFETY ADULT  Goal: Patient will remain free of falls  Description: INTERVENTIONS:  - Educate patient/family on patient safety including physical limitations  - Instruct patient to call for assistance with activity   - Consult OT/PT to assist with strengthening/mobility   - Keep Call bell within reach  - Keep bed low and locked with side rails adjusted as appropriate  - Keep care items and personal belongings within reach  - Initiate and maintain comfort rounds  - Make Fall Risk Sign visible to staff  - Offer Toileting every 2 Hours, in advance of need  - Initiate/Maintain bed alarm  - Obtain necessary fall risk management equipment:   - Apply yellow socks and bracelet for high fall risk patients  - Consider moving patient to room near nurses station  Outcome: Progressing  Goal: Maintain or return to baseline ADL function  Description: INTERVENTIONS:  -  Assess patient's ability to carry out ADLs; assess patient's baseline for ADL function and identify physical deficits which impact ability to perform ADLs (bathing, care of mouth/teeth, toileting, grooming, dressing, etc.)  - Assess/evaluate cause of self-care deficits   - Assess range of motion  - Assess patient's mobility; develop plan if impaired  - Assess patient's need for assistive devices and provide as appropriate  - Encourage maximum independence but intervene and supervise when  necessary  - Involve family in performance of ADLs  - Assess for home care needs following discharge   - Consider OT consult to assist with ADL evaluation and planning for discharge  - Provide patient education as appropriate  Outcome: Progressing  Goal: Maintains/Returns to pre admission functional level  Description: INTERVENTIONS:  - Perform AM-PAC 6 Click Basic Mobility/ Daily Activity assessment daily.  - Set and communicate daily mobility goal to care team and patient/family/caregiver.   - Collaborate with rehabilitation services on mobility goals if consulted  - Perform Range of Motion 4 times a day.  - Reposition patient every 3 hours.  - Dangle patient 3 times a day  - Stand patient 3 times a day  - Ambulate patient 3 times a day  - Out of bed to chair 3 times a day   - Out of bed for meals 3 times a day  - Out of bed for toileting  - Record patient progress and toleration of activity level   Outcome: Progressing     Problem: Knowledge Deficit  Goal: Patient/family/caregiver demonstrates understanding of disease process, treatment plan, medications, and discharge instructions  Description: Complete learning assessment and assess knowledge base.  Interventions:  - Provide teaching at level of understanding  - Provide teaching via preferred learning methods  Outcome: Progressing     Problem: METABOLIC, FLUID AND ELECTROLYTES - ADULT  Goal: Electrolytes maintained within normal limits  Description: INTERVENTIONS:  - Monitor labs and assess patient for signs and symptoms of electrolyte imbalances  - Administer electrolyte replacement as ordered  - Monitor response to electrolyte replacements, including repeat lab results as appropriate  - Instruct patient on fluid and nutrition as appropriate  Outcome: Progressing     Problem: HEMATOLOGIC - ADULT  Goal: Maintains hematologic stability  Description: INTERVENTIONS  - Assess for signs and symptoms of bleeding or hemorrhage  - Monitor labs  - Administer supportive  blood products/factors as ordered and appropriate  Outcome: Progressing

## 2025-05-15 NOTE — PROGRESS NOTES
Progress Note - Vascular Surgery   Name: Gold Van 79 y.o. 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 Pt admitted and started on ABX. Pt seen by podiatry w/ plans for R partial 5th ray resection on Thurs (5/15). Pt was transferred to [facility] on 5/11/25 for vascular surgical intervention.     Vascular surgery consulted for worsening R 5th met wound in the setting of PAD. CTA abd shows L CFA/SFA stenosis w/ focal occlusions and AT/peroneal occlusions w/ reconstitution. LEAD shows R PTA occlusion and R RIC: 0.42/15/16.    Pt is S/P R CFA/SFA endarterectomy w/ bovine pericardial patch, DCB and stenting of SFA, and angio of AT on 5/13.     Diagnostics:  -5/6/25 CTA abd w/ runoff: B/L EDY/EIA/IIA patent w/ atherosclerosis. Right: Focal high-grade stenosis of distal R CFA and diffuse atherosclerotic disease of R SFA resulting in moderate to severe stenoses with focal near complete occlusions at the proximal and distal segments. Short segment occlusions of proximal R AT and peroneal arteries with reconstitution. R PTA is occluded. Left: Occluded L SFA with reconstitution. L YUNG and PTA occluded. One-vessel runoff LLE through peroneal artery.  -4/18/25 LEAD: Right: >75% prox SFA and 50-75% dSFA stenosis. Distal PTA occlusion. R RIC: 0.42/15/16. Left: PTA and YUNG occlusions. L RIC: 1.06/85/45.   -4/17/25 MRI R foot: Possible early OM in R 5th met    Plan:  -R 5th met wound (+) OM in the setting of PAD  -S/P R CFA/SFA endarterectomy w/ bovine pericardial patch, DCB and stenting of SFA, and angio of AT on 5/13 (POD  #1)  -ASA discontinued  -Plavix started for new SFA stents  -Continue plavix and lipitor for medical management of PAD  -Continue ABX for R foot wound per primary team   -Continue to hold eliquis until all vascular and podiatric procedures are completed; continue heparin gtt per primary team  -Podiatry following w/ plans for R partial 5th ray resection today, Thurs (5/15); input appreciated  -Endo consult pending for assistance w/ DM control  -Critical Care following for medical management; assistance appreciated  -Okay for transfer out of ICU and return to [facility] as primary  -Will continue to follow on consult while pt remains hospitalized    Please contact the SecureChat role for the Vascular Surgery service with any questions/concerns.    Subjective   NAEON. AVSS on room air. Patient reports pain is controlled, mild pain in right foot. Tolerating diet, NPO this morning for procedure today. Voiding spontaneously and having bowel function. Denies fever, chills, nausea, vomiting.     Objective :  Temp:  [97.9 °F (36.6 °C)-99.7 °F (37.6 °C)] 97.9 °F (36.6 °C)  HR:  [75-92] 82  BP: (108-130)/(56-66) 108/56  Resp:  [14-31] 20  SpO2:  [91 %-98 %] 96 %  O2 Device: None (Room air)    I/O         05/13 0701  05/14 0700 05/14 0701  05/15 0700    P.O.  1520    I.V. (mL/kg) 4736.6 (64.4) 127.4 (1.7)    IV Piggyback 50 200    Total Intake(mL/kg) 4786.6 (65.1) 1847.4 (24.5)    Urine (mL/kg/hr) 2030 (1.2) 1635 (0.9)    Drains  0    Blood 350     Total Output 2380 1635    Net +2406.6 +212.4                  Physical Exam  General: NAD  HENT: NCAT MMM  Neck: supple, no JVD  CV: nl rate  Lungs: nl wob. No resp distress  ABD: Soft, nontender, nondistended.  Right groin with Prevena VAC in place.  Extrem: No CCE.  Bilateral DP PT signals.  Dressing in place over right foot  Neuro: AAOx3      Lab Results: I have reviewed the following results:  Recent Labs     05/13/25  1512 05/14/25  0221 05/14/25  0436 05/14/25  0839 05/15/25  0419  05/15/25  0433 05/15/25  0445   WBC  --   --  11.03*  --   --  10.43*  --    HGB 8.5*  --  8.4*  --   --  7.4*  --    HCT 25*  --  25.6*  --   --  22.5*  --    PLT  --   --  222  --   --  197  --    SODIUM  --   --  134*  --   --   --  133*   K  --   --  4.6  --   --   --  4.2   CL  --   --  104  --   --   --  102   CO2 22  --  23  --   --   --  25   BUN  --   --  14  --   --   --  18   CREATININE  --   --  1.13  --   --   --  1.13   GLUC  --   --  242*  --   --   --  228*   CAIONIZED 1.09*  --   --   --   --   --   --    MG  --   --  2.1  --   --   --   --    PHOS  --   --  4.0  --   --   --   --    AST  --   --  31  --   --   --   --    ALT  --   --  15  --   --   --   --    ALB  --   --  2.9*  --   --   --   --    TBILI  --   --  0.38  --   --   --   --    ALKPHOS  --   --  52  --   --   --   --    PTT  --    < >  --    < > 70*  --   --     < > = values in this interval not displayed.       VTE Pharmacologic Prophylaxis: VTE covered by:  heparin (porcine), Intravenous, 20 Units/kg/hr at 05/15/25 0017  heparin (porcine), Intravenous, 2,000 Units at 05/14/25 2309  heparin (porcine), Intravenous, 4,000 Units at 05/14/25 0920     VTE Mechanical Prophylaxis: sequential compression device    Balbir Mohan MD  General Surgery Resident

## 2025-05-15 NOTE — ASSESSMENT & PLAN NOTE
78 yo male ex-smoker w/ a hx of DM II (A1c 9.3), HTN, HLD, asthma, CVA (9/2024), PAF (eliquis), cardiomyopathy and PAD presented to [facility] on 5/7/25 w/ R foot pain x1 week and non-healing R 5th met wound x4-5 weeks. Pt admitted and started on ABX. Pt seen by podiatry w/ plans for R partial 5th ray resection on Thurs (5/15). Pt was transferred to [facility] on 5/11/25 for vascular surgical intervention.     Vascular surgery consulted for worsening R 5th met wound in the setting of PAD. CTA abd shows L CFA/SFA stenosis w/ focal occlusions and AT/peroneal occlusions w/ reconstitution. LEAD shows R PTA occlusion and R RIC: 0.42/15/16.    Pt is S/P R CFA/SFA endarterectomy w/ bovine pericardial patch, DCB and stenting of SFA, and angio of AT on 5/13.     Diagnostics:  -5/6/25 CTA abd w/ runoff: B/L EDY/EIA/IIA patent w/ atherosclerosis. Right: Focal high-grade stenosis of distal R CFA and diffuse atherosclerotic disease of R SFA resulting in moderate to severe stenoses with focal near complete occlusions at the proximal and distal segments. Short segment occlusions of proximal R AT and peroneal arteries with reconstitution. R PTA is occluded. Left: Occluded L SFA with reconstitution. L YUNG and PTA occluded. One-vessel runoff LLE through peroneal artery.  -4/18/25 LEAD: Right: >75% prox SFA and 50-75% dSFA stenosis. Distal PTA occlusion. R RIC: 0.42/15/16. Left: PTA and YUGN occlusions. L RIC: 1.06/85/45.   -4/17/25 MRI R foot: Possible early OM in R 5th met    Plan:  -R 5th met wound (+) OM in the setting of PAD  -S/P R CFA/SFA endarterectomy w/ bovine pericardial patch, DCB and stenting of SFA, and angio of AT on 5/13 (POD #1)  -ASA discontinued  -Plavix started for new SFA stents  -Continue plavix and lipitor for medical management of PAD  -Continue ABX for R foot wound per primary team   -Continue to hold eliquis until all vascular and podiatric procedures are completed; continue heparin gtt per primary team  -Podiatry  following w/ plans for R partial 5th ray resection today, Thurs (5/15); input appreciated  -Endo consult pending for assistance w/ DM control  -Critical Care following for medical management; assistance appreciated  -Okay for transfer out of ICU and return to [facility] as primary  -Will continue to follow on consult while pt remains hospitalized

## 2025-05-15 NOTE — ASSESSMENT & PLAN NOTE
Continue atenolol.  Anticoagulation: Apixaban on hold for anticipated vascular procedure  On heparin infusion

## 2025-05-16 ENCOUNTER — APPOINTMENT (INPATIENT)
Dept: RADIOLOGY | Facility: HOSPITAL | Age: 80
DRG: 853 | End: 2025-05-16
Payer: COMMERCIAL

## 2025-05-16 LAB
ALBUMIN SERPL BCG-MCNC: 2.6 G/DL (ref 3.5–5)
ALP SERPL-CCNC: 51 U/L (ref 34–104)
ALT SERPL W P-5'-P-CCNC: 4 U/L (ref 7–52)
ANION GAP SERPL CALCULATED.3IONS-SCNC: 6 MMOL/L (ref 4–13)
AST SERPL W P-5'-P-CCNC: 12 U/L (ref 13–39)
BILIRUB SERPL-MCNC: 0.3 MG/DL (ref 0.2–1)
BUN SERPL-MCNC: 14 MG/DL (ref 5–25)
CALCIUM ALBUM COR SERPL-MCNC: 9.2 MG/DL (ref 8.3–10.1)
CALCIUM SERPL-MCNC: 8.1 MG/DL (ref 8.4–10.2)
CHLORIDE SERPL-SCNC: 103 MMOL/L (ref 96–108)
CO2 SERPL-SCNC: 25 MMOL/L (ref 21–32)
CREAT SERPL-MCNC: 0.87 MG/DL (ref 0.6–1.3)
ERYTHROCYTE [DISTWIDTH] IN BLOOD BY AUTOMATED COUNT: 14.7 % (ref 11.6–15.1)
GFR SERPL CREATININE-BSD FRML MDRD: 82 ML/MIN/1.73SQ M
GLUCOSE SERPL-MCNC: 167 MG/DL (ref 65–140)
GLUCOSE SERPL-MCNC: 185 MG/DL (ref 65–140)
GLUCOSE SERPL-MCNC: 199 MG/DL (ref 65–140)
GLUCOSE SERPL-MCNC: 223 MG/DL (ref 65–140)
GLUCOSE SERPL-MCNC: 229 MG/DL (ref 65–140)
GLUCOSE SERPL-MCNC: 258 MG/DL (ref 65–140)
HCT VFR BLD AUTO: 22.3 % (ref 36.5–49.3)
HCT VFR BLD AUTO: 23.5 % (ref 36.5–49.3)
HGB BLD-MCNC: 7.4 G/DL (ref 12–17)
HGB BLD-MCNC: 7.7 G/DL (ref 12–17)
MCH RBC QN AUTO: 30.2 PG (ref 26.8–34.3)
MCHC RBC AUTO-ENTMCNC: 33.2 G/DL (ref 31.4–37.4)
MCV RBC AUTO: 91 FL (ref 82–98)
PLATELET # BLD AUTO: 236 THOUSANDS/UL (ref 149–390)
PMV BLD AUTO: 9.9 FL (ref 8.9–12.7)
POTASSIUM SERPL-SCNC: 4 MMOL/L (ref 3.5–5.3)
PROT SERPL-MCNC: 5.4 G/DL (ref 6.4–8.4)
RBC # BLD AUTO: 2.45 MILLION/UL (ref 3.88–5.62)
SODIUM SERPL-SCNC: 134 MMOL/L (ref 135–147)
WBC # BLD AUTO: 10.5 THOUSAND/UL (ref 4.31–10.16)

## 2025-05-16 PROCEDURE — 87186 SC STD MICRODIL/AGAR DIL: CPT | Performed by: PODIATRIST

## 2025-05-16 PROCEDURE — 73630 X-RAY EXAM OF FOOT: CPT

## 2025-05-16 PROCEDURE — 80053 COMPREHEN METABOLIC PANEL: CPT | Performed by: INTERNAL MEDICINE

## 2025-05-16 PROCEDURE — 99232 SBSQ HOSP IP/OBS MODERATE 35: CPT | Performed by: INTERNAL MEDICINE

## 2025-05-16 PROCEDURE — 82948 REAGENT STRIP/BLOOD GLUCOSE: CPT

## 2025-05-16 PROCEDURE — 87070 CULTURE OTHR SPECIMN AEROBIC: CPT | Performed by: PODIATRIST

## 2025-05-16 PROCEDURE — 87077 CULTURE AEROBIC IDENTIFY: CPT | Performed by: PODIATRIST

## 2025-05-16 PROCEDURE — 88305 TISSUE EXAM BY PATHOLOGIST: CPT | Performed by: PATHOLOGY

## 2025-05-16 PROCEDURE — 85018 HEMOGLOBIN: CPT

## 2025-05-16 PROCEDURE — RECHECK: Performed by: INTERNAL MEDICINE

## 2025-05-16 PROCEDURE — 85027 COMPLETE CBC AUTOMATED: CPT | Performed by: INTERNAL MEDICINE

## 2025-05-16 PROCEDURE — 87075 CULTR BACTERIA EXCEPT BLOOD: CPT | Performed by: PODIATRIST

## 2025-05-16 PROCEDURE — 85014 HEMATOCRIT: CPT

## 2025-05-16 PROCEDURE — 28810 AMPUTATION TOE & METATARSAL: CPT | Performed by: PODIATRIST

## 2025-05-16 PROCEDURE — NC001 PR NO CHARGE: Performed by: PODIATRIST

## 2025-05-16 PROCEDURE — 88311 DECALCIFY TISSUE: CPT | Performed by: PATHOLOGY

## 2025-05-16 PROCEDURE — 87205 SMEAR GRAM STAIN: CPT | Performed by: PODIATRIST

## 2025-05-16 PROCEDURE — 0Y6M0ZF DETACHMENT AT RIGHT FOOT, PARTIAL 5TH RAY, OPEN APPROACH: ICD-10-PCS | Performed by: INTERNAL MEDICINE

## 2025-05-16 PROCEDURE — 99024 POSTOP FOLLOW-UP VISIT: CPT | Performed by: NURSE PRACTITIONER

## 2025-05-16 RX ORDER — INSULIN GLARGINE 100 [IU]/ML
10 INJECTION, SOLUTION SUBCUTANEOUS
Status: DISCONTINUED | OUTPATIENT
Start: 2025-05-16 | End: 2025-05-17

## 2025-05-16 RX ORDER — CEFAZOLIN SODIUM 1 G/3ML
INJECTION, POWDER, FOR SOLUTION INTRAMUSCULAR; INTRAVENOUS AS NEEDED
Status: DISCONTINUED | OUTPATIENT
Start: 2025-05-16 | End: 2025-05-16

## 2025-05-16 RX ORDER — ONDANSETRON 2 MG/ML
4 INJECTION INTRAMUSCULAR; INTRAVENOUS ONCE AS NEEDED
Status: DISCONTINUED | OUTPATIENT
Start: 2025-05-16 | End: 2025-05-16 | Stop reason: HOSPADM

## 2025-05-16 RX ORDER — SODIUM CHLORIDE, SODIUM LACTATE, POTASSIUM CHLORIDE, CALCIUM CHLORIDE 600; 310; 30; 20 MG/100ML; MG/100ML; MG/100ML; MG/100ML
75 INJECTION, SOLUTION INTRAVENOUS CONTINUOUS
Status: DISCONTINUED | OUTPATIENT
Start: 2025-05-16 | End: 2025-05-17

## 2025-05-16 RX ORDER — IBUPROFEN 200 MG
200 TABLET ORAL ONCE
Status: COMPLETED | OUTPATIENT
Start: 2025-05-16 | End: 2025-05-16

## 2025-05-16 RX ORDER — MAGNESIUM HYDROXIDE 1200 MG/15ML
LIQUID ORAL AS NEEDED
Status: DISCONTINUED | OUTPATIENT
Start: 2025-05-16 | End: 2025-05-16 | Stop reason: HOSPADM

## 2025-05-16 RX ORDER — PHENYLEPHRINE HCL IN 0.9% NACL 1 MG/10 ML
SYRINGE (ML) INTRAVENOUS AS NEEDED
Status: DISCONTINUED | OUTPATIENT
Start: 2025-05-16 | End: 2025-05-16

## 2025-05-16 RX ORDER — CLOPIDOGREL BISULFATE 75 MG/1
75 TABLET ORAL DAILY
Qty: 90 TABLET | Refills: 0 | Status: SHIPPED | OUTPATIENT
Start: 2025-05-17

## 2025-05-16 RX ORDER — LIDOCAINE HYDROCHLORIDE 10 MG/ML
INJECTION, SOLUTION EPIDURAL; INFILTRATION; INTRACAUDAL; PERINEURAL AS NEEDED
Status: DISCONTINUED | OUTPATIENT
Start: 2025-05-16 | End: 2025-05-16 | Stop reason: HOSPADM

## 2025-05-16 RX ORDER — FENTANYL CITRATE 50 UG/ML
INJECTION, SOLUTION INTRAMUSCULAR; INTRAVENOUS AS NEEDED
Status: DISCONTINUED | OUTPATIENT
Start: 2025-05-16 | End: 2025-05-16

## 2025-05-16 RX ORDER — FENTANYL CITRATE/PF 50 MCG/ML
25 SYRINGE (ML) INJECTION
Status: DISCONTINUED | OUTPATIENT
Start: 2025-05-16 | End: 2025-05-16 | Stop reason: HOSPADM

## 2025-05-16 RX ORDER — SODIUM CHLORIDE, SODIUM LACTATE, POTASSIUM CHLORIDE, CALCIUM CHLORIDE 600; 310; 30; 20 MG/100ML; MG/100ML; MG/100ML; MG/100ML
INJECTION, SOLUTION INTRAVENOUS CONTINUOUS PRN
Status: DISCONTINUED | OUTPATIENT
Start: 2025-05-16 | End: 2025-05-16

## 2025-05-16 RX ORDER — GLIPIZIDE 5 MG/1
10 TABLET, FILM COATED, EXTENDED RELEASE ORAL
Status: DISCONTINUED | OUTPATIENT
Start: 2025-05-16 | End: 2025-05-28 | Stop reason: HOSPADM

## 2025-05-16 RX ORDER — PROPOFOL 10 MG/ML
INJECTION, EMULSION INTRAVENOUS AS NEEDED
Status: DISCONTINUED | OUTPATIENT
Start: 2025-05-16 | End: 2025-05-16

## 2025-05-16 RX ORDER — ALBUTEROL SULFATE 0.83 MG/ML
2.5 SOLUTION RESPIRATORY (INHALATION) ONCE AS NEEDED
Status: DISCONTINUED | OUTPATIENT
Start: 2025-05-16 | End: 2025-05-16 | Stop reason: HOSPADM

## 2025-05-16 RX ADMIN — ATORVASTATIN CALCIUM 40 MG: 40 TABLET, FILM COATED ORAL at 17:20

## 2025-05-16 RX ADMIN — ATENOLOL 25 MG: 25 TABLET ORAL at 08:56

## 2025-05-16 RX ADMIN — PROPOFOL 90 MCG/KG/MIN: 10 INJECTION, EMULSION INTRAVENOUS at 13:11

## 2025-05-16 RX ADMIN — INSULIN LISPRO 2 UNITS: 100 INJECTION, SOLUTION INTRAVENOUS; SUBCUTANEOUS at 21:34

## 2025-05-16 RX ADMIN — MONTELUKAST 10 MG: 10 TABLET, FILM COATED ORAL at 21:35

## 2025-05-16 RX ADMIN — FENTANYL CITRATE 25 MCG: 50 INJECTION INTRAMUSCULAR; INTRAVENOUS at 13:59

## 2025-05-16 RX ADMIN — PREDNISONE 5 MG: 5 TABLET ORAL at 08:56

## 2025-05-16 RX ADMIN — METFORMIN ER 500 MG 500 MG: 500 TABLET ORAL at 16:27

## 2025-05-16 RX ADMIN — FAMOTIDINE 20 MG: 20 TABLET, FILM COATED ORAL at 17:20

## 2025-05-16 RX ADMIN — Medication 100 MCG: at 13:21

## 2025-05-16 RX ADMIN — CEFAZOLIN SODIUM 2000 MG: 2 SOLUTION INTRAVENOUS at 05:31

## 2025-05-16 RX ADMIN — FAMOTIDINE 20 MG: 20 TABLET, FILM COATED ORAL at 08:56

## 2025-05-16 RX ADMIN — INSULIN LISPRO 2 UNITS: 100 INJECTION, SOLUTION INTRAVENOUS; SUBCUTANEOUS at 08:55

## 2025-05-16 RX ADMIN — BUDESONIDE, GLYCOPYRROLATE, AND FORMOTEROL FUMARATE 2 PUFF: 160; 9; 4.8 AEROSOL, METERED RESPIRATORY (INHALATION) at 07:16

## 2025-05-16 RX ADMIN — PROPOFOL 50 MG: 10 INJECTION, EMULSION INTRAVENOUS at 13:10

## 2025-05-16 RX ADMIN — GLIPIZIDE 10 MG: 5 TABLET, FILM COATED, EXTENDED RELEASE ORAL at 16:26

## 2025-05-16 RX ADMIN — CEFAZOLIN 2000 MG: 1 INJECTION, POWDER, FOR SOLUTION INTRAMUSCULAR; INTRAVENOUS at 13:14

## 2025-05-16 RX ADMIN — FENTANYL CITRATE 25 MCG: 50 INJECTION INTRAMUSCULAR; INTRAVENOUS at 13:20

## 2025-05-16 RX ADMIN — METRONIDAZOLE 500 MG: 500 INJECTION, SOLUTION INTRAVENOUS at 03:38

## 2025-05-16 RX ADMIN — BUDESONIDE, GLYCOPYRROLATE, AND FORMOTEROL FUMARATE 2 PUFF: 160; 9; 4.8 AEROSOL, METERED RESPIRATORY (INHALATION) at 21:36

## 2025-05-16 RX ADMIN — CLOPIDOGREL BISULFATE 75 MG: 75 TABLET, FILM COATED ORAL at 08:56

## 2025-05-16 RX ADMIN — ACETAMINOPHEN 975 MG: 325 TABLET, FILM COATED ORAL at 05:30

## 2025-05-16 RX ADMIN — SODIUM CHLORIDE, SODIUM LACTATE, POTASSIUM CHLORIDE, AND CALCIUM CHLORIDE 75 ML/HR: .6; .31; .03; .02 INJECTION, SOLUTION INTRAVENOUS at 12:56

## 2025-05-16 RX ADMIN — SENNOSIDES 8.6 MG: 8.6 TABLET, FILM COATED ORAL at 08:56

## 2025-05-16 RX ADMIN — METRONIDAZOLE 500 MG: 500 INJECTION, SOLUTION INTRAVENOUS at 16:27

## 2025-05-16 RX ADMIN — SODIUM CHLORIDE, SODIUM LACTATE, POTASSIUM CHLORIDE, AND CALCIUM CHLORIDE 75 ML/HR: .6; .31; .03; .02 INJECTION, SOLUTION INTRAVENOUS at 21:38

## 2025-05-16 RX ADMIN — MORPHINE SULFATE 2 MG: 2 INJECTION, SOLUTION INTRAMUSCULAR; INTRAVENOUS at 19:10

## 2025-05-16 RX ADMIN — ONDANSETRON 4 MG: 2 INJECTION INTRAMUSCULAR; INTRAVENOUS at 11:14

## 2025-05-16 RX ADMIN — LISINOPRIL 20 MG: 20 TABLET ORAL at 21:35

## 2025-05-16 RX ADMIN — LIDOCAINE 1 PATCH: 50 PATCH CUTANEOUS at 22:55

## 2025-05-16 RX ADMIN — FENTANYL CITRATE 25 MCG: 50 INJECTION INTRAMUSCULAR; INTRAVENOUS at 14:03

## 2025-05-16 RX ADMIN — Medication 1 TABLET: at 08:58

## 2025-05-16 RX ADMIN — Medication 100 MCG: at 13:35

## 2025-05-16 RX ADMIN — ACETAMINOPHEN 975 MG: 325 TABLET, FILM COATED ORAL at 21:34

## 2025-05-16 RX ADMIN — INSULIN LISPRO 1 UNITS: 100 INJECTION, SOLUTION INTRAVENOUS; SUBCUTANEOUS at 11:27

## 2025-05-16 RX ADMIN — APIXABAN 5 MG: 5 TABLET, FILM COATED ORAL at 17:20

## 2025-05-16 RX ADMIN — INSULIN GLARGINE 10 UNITS: 100 INJECTION, SOLUTION SUBCUTANEOUS at 21:34

## 2025-05-16 RX ADMIN — CEFAZOLIN SODIUM 2000 MG: 2 SOLUTION INTRAVENOUS at 22:55

## 2025-05-16 RX ADMIN — FENTANYL CITRATE 25 MCG: 50 INJECTION INTRAMUSCULAR; INTRAVENOUS at 13:10

## 2025-05-16 RX ADMIN — INSULIN LISPRO 2 UNITS: 100 INJECTION, SOLUTION INTRAVENOUS; SUBCUTANEOUS at 16:26

## 2025-05-16 RX ADMIN — OXYCODONE HYDROCHLORIDE 10 MG: 10 TABLET ORAL at 16:26

## 2025-05-16 RX ADMIN — IBUPROFEN 200 MG: 200 TABLET, FILM COATED ORAL at 03:37

## 2025-05-16 RX ADMIN — Medication 100 MCG: at 13:50

## 2025-05-16 RX ADMIN — SODIUM CHLORIDE, SODIUM LACTATE, POTASSIUM CHLORIDE, AND CALCIUM CHLORIDE: .6; .31; .03; .02 INJECTION, SOLUTION INTRAVENOUS at 13:04

## 2025-05-16 RX ADMIN — LISINOPRIL 20 MG: 20 TABLET ORAL at 08:56

## 2025-05-16 NOTE — PROGRESS NOTES
Progress Note - Vascular Surgery   Name: Gold Van 79 y.o. 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 Pt admitted and started on ABX. Pt seen by podiatry w/ plans for R partial 5th ray resection on Fri (5/16). Pt was transferred to [facility] on 5/11/25 for vascular surgical intervention.     Vascular surgery consulted for worsening R 5th met wound in the setting of PAD. CTA abd shows L CFA/SFA stenosis w/ focal occlusions and AT/peroneal occlusions w/ reconstitution. LEAD shows R PTA occlusion and R RIC: 0.42/15/16.    Pt is S/P R CFA/SFA endarterectomy w/ bovine pericardial patch, DCB and stenting of SFA, and angio of AT on 5/13.     Diagnostics:  -5/6/25 CTA abd w/ runoff: B/L EDY/EIA/IIA patent w/ atherosclerosis. Right: Focal high-grade stenosis of distal R CFA and diffuse atherosclerotic disease of R SFA resulting in moderate to severe stenoses with focal near complete occlusions at the proximal and distal segments. Short segment occlusions of proximal R AT and peroneal arteries with reconstitution. R PTA is occluded. Left: Occluded L SFA with reconstitution. L YUNG and PTA occluded. One-vessel runoff LLE through peroneal artery.  -4/18/25 LEAD: Right: >75% prox SFA and 50-75% dSFA stenosis. Distal PTA occlusion. R RIC: 0.42/15/16. Left: PTA and YUNG occlusions. L RIC: 1.06/85/45.   -4/17/25 MRI R foot: Possible early OM in R 5th met    Plan:  -R 5th met wound (+) OM in the setting of PAD  -S/P R CFA/SFA endarterectomy w/ bovine pericardial patch, DCB and stenting of SFA, and angio of AT on 5/13 (POD  #3)  -Continue plavix (new SFA stents) and lipitor for medical management of PAD  -Continue ABX for R foot wound per primary team   -Continue to hold eliquis until podiatric procedure completed; continue heparin gtt per primary team  -Podiatry following w/ plans for R partial 5th ray resection today, Fri (5/16); input appreciated  -Continue medical management per primary team  -Pt is stable for discharge from a vascular surgery standpoint  -Will continue to follow peripherally while pt remains hospitalized  -Plan follow up outpt in vascular surgery office; appt scheduled for 6/5/25  -Will discuss w/ Dr. Cee    Subjective : Pt seen for exam while sitting in his chair; NAD. Pt reports R foot pain at site of wound and otherwise, denies any further complaints at this time.     Objective :  Temp:  [98.3 °F (36.8 °C)-101.2 °F (38.4 °C)] 99.4 °F (37.4 °C)  HR:  [] 87  BP: (110-127)/(60-64) 110/61  Resp:  [16-20] 20  SpO2:  [92 %-97 %] 92 %     I/O         05/14 0701  05/15 0700 05/15 0701  05/16 0700 05/16 0701  05/17 0700    P.O. 1520 780     I.V. (mL/kg) 127.4 (1.7)      IV Piggyback 200      Total Intake(mL/kg) 1847.4 (24.6) 780 (10.4)     Urine (mL/kg/hr) 1635 (0.9) 1300 (0.7)     Drains 0      Blood       Total Output 1635 1300     Net +212.4 -520                    Physical Exam  Vitals and nursing note reviewed.   Constitutional:       General: He is awake. He is not in acute distress.     Appearance: Normal appearance. He is well-developed.   HENT:      Head: Normocephalic and atraumatic.     Cardiovascular:      Rate and Rhythm: Normal rate and regular rhythm.      Pulses:           Dorsalis pedis pulses are detected w/ Doppler on the right side and detected w/ Doppler on the left side.        Posterior tibial pulses are detected w/ Doppler on the right side and detected w/ Doppler on the left side.      Heart sounds: Normal heart sounds.      Comments: Trace pedal and LE edema B/L, R >L  Pulmonary:       Effort: Pulmonary effort is normal. No respiratory distress.      Breath sounds: Normal breath sounds.   Abdominal:      General: There is no distension.      Palpations: Abdomen is soft.     Musculoskeletal:         General: No swelling.      Cervical back: Neck supple.      Right lower leg: Edema present.      Left lower leg: Edema present.     Skin:     General: Skin is warm and dry.      Capillary Refill: Capillary refill takes less than 2 seconds.      Findings: Wound present.      Comments: R 5th met wound     Neurological:      General: No focal deficit present.      Mental Status: He is alert and oriented to person, place, and time. Mental status is at baseline.     Psychiatric:         Mood and Affect: Mood normal.         Speech: Speech normal.         Behavior: Behavior normal. Behavior is cooperative.         Thought Content: Thought content normal.         Judgment: Judgment normal.       Wound/Incision:  R 5th metatarsal wound w/ dressing clean, dry and intact. R groin incision site soft, no swelling. Incision covered w/ prevena vac w/ adequate suction. No drainage in tubing or collection canister.      Lab Results: I have reviewed the following results:  CBC with diff:   Lab Results   Component Value Date    WBC 10.50 (H) 05/16/2025    HGB 7.4 (L) 05/16/2025    HCT 22.3 (L) 05/16/2025    MCV 91 05/16/2025     05/16/2025    RBC 2.45 (L) 05/16/2025    MCH 30.2 05/16/2025    MCHC 33.2 05/16/2025    RDW 14.7 05/16/2025    MPV 9.9 05/16/2025    NRBC 0 05/07/2025     BMP/CMP:  Lab Results   Component Value Date    SODIUM 134 (L) 05/16/2025    K 4.0 05/16/2025    K 4.5 04/14/2015     05/16/2025     04/14/2015    CO2 25 05/16/2025    CO2 22 05/13/2025    ANIONGAP 8 04/14/2015    BUN 14 05/16/2025    BUN 10 04/14/2015    CREATININE 0.87 05/16/2025    CREATININE 0.86 04/14/2015    GLUCOSE 262 (H) 05/13/2025    GLUCOSE 179 (H) 04/14/2015    CALCIUM 8.1 (L) 05/16/2025    CALCIUM 8.9 04/14/2015     AST 12 (L) 05/16/2025    AST 12 08/12/2014    ALT 4 (L) 05/16/2025    ALT 19 08/12/2014    ALKPHOS 51 05/16/2025    ALKPHOS 101 08/12/2014    PROT 6.8 08/12/2014    BILITOT 0.6 08/12/2014    EGFR 82 05/16/2025     Coags:   Lab Results   Component Value Date    PTT 53 (H) 05/15/2025    INR 1.14 05/07/2025     Blood Culture:   Lab Results   Component Value Date    BLOODCX No Growth After 5 Days. 05/07/2025    BLOODCX No Growth After 5 Days. 05/07/2025     Urinalysis:   Lab Results   Component Value Date    COLORU Yellow 05/07/2025    COLORU Yellow 08/12/2014    CLARITYU Clear 05/07/2025    CLARITYU Clear 08/12/2014    SPECGRAV 1.010 05/07/2025    SPECGRAV 1.025 08/12/2014    PHUR 6.0 05/07/2025    PHUR 6.0 06/14/2016    PHUR 6.0 08/12/2014    LEUKOCYTESUR Negative 05/07/2025    LEUKOCYTESUR Negative 08/12/2014    NITRITE Negative 05/07/2025    NITRITE Negative 08/12/2014    PROTEINUA Negative 08/12/2014    GLUCOSEU Negative 05/07/2025    GLUCOSEU 100 (1/10%) (A) 08/12/2014    KETONESU Negative 05/07/2025    KETONESU Negative 08/12/2014    BILIRUBINUR Negative 05/07/2025    BILIRUBINUR Negative 08/12/2014    BLOODU 1+ (A) 05/07/2025    BLOODU Trace (A) 08/12/2014

## 2025-05-16 NOTE — OP NOTE
OPERATIVE REPORT  PATIENT NAME: Gold Van    :  1945  MRN: [id number]  Pt Location: [facility] OR ROOM 06    SURGERY DATE: 2025    Surgeons and Role:     * Reinaldo Brewer DPM - Primary     * Bang Quinteros DPM - Assisting    Preop Diagnosis:  Ulcer of right foot with fat layer exposed (HCC) [L97.512]    Post-Op Diagnosis Codes:     * Ulcer of right foot with fat layer exposed (HCC) [L97.512]    Procedure(s):  Right - RAY RESECTION FOOT - R partial 5th ray resection    Specimen(s):  ID Type Source Tests Collected by Time Destination   1 : fifth toe, right foot Tissue Toe, Right TISSUE EXAM Reinaldo Brewer DPM 2025 1336    A : right foot Wound Foot, Right ANAEROBIC CULTURE AND GRAM STAIN, WOUND CULTURE Reinaldo Brewer DPM 2025 1337        Estimated Blood Loss:   Minimal    Drains:  [REMOVED] Urethral Catheter Latex 16 Fr. (Removed)   Reasons to continue Urinary Catheter  Accurate I&O assessment in critically ill patients (48 hr. max) 25 0806   Goal for Removal Remove POD#1 25 0806   Site Assessment Clean;Skin intact 25 0806   Gaspar Care Done 25 0900   Collection Container Standard drainage bag 25 0806   Securement Method Securing device (Describe) 25 0806   Output (mL) 60 mL 25 0921   Number of days: 1       Anesthesia Type:   Choice    Operative Indications:  Ulcer of right foot with fat layer exposed (HCC) [L97.512]      Operative Findings:  1) noted soft, necrotic bone at the fifth metatarsal head consistent with osteomyelitis.  Partial fifth ray resection performed, residual fifth metatarsal stump of good heart quality.  Will plan to assume surgical cure of osteomyelitis.  2) significant necrotic tissue noted within wound bed.  Noted yellow viscous fluid within the metatarsal phalangeal joint consistent with infection.  3) given above-noted infection and large soft tissue defect decision to apply wound VAC was made intraoperatively.  4) will plan for wound VAC  change Monday, 5/18/2025.  At this time we will decide for continued VAC therapy versus repeat washout of the right foot wound.  5) partial weightbearing to right foot.  6)Infection was present at time of surgery as evidenced by infected fluid/tissue and osteomyelitis.  Deep operative cultures taken to tailor antibiotics.       Complications:   None    Procedure and Technique:  Patient is brought back to the operating room and remained on the stretcher for the entire to the procedure.  After anesthesia was administered a preinjection timeout was completed with all parties in agreement.  A local block consisting of 10 cc of 1% lidocaine and 0.5% Marcaine was injected to the right foot.  The right foot was then prepped and draped in the normal sterile manner.  A preincision time was then completed with all parties in agreement.    Attention was then drawn to the right foot.  Utilizing a 15 blade a racquet incision overlying the fifth metatarsal and digit was made.  Incision was made down to bone.  The wound overlying the lateral border of the foot was excised.  It measured approximately 3 cm x 2.5 cm.  The fifth digit was disarticulated and passed off of the table for pathological examination.  Noted upon excision of the toe was yellow viscous fluid within the joint consistent with an infection.  There is also significant fibrotic tissue.  Soft tissue was reflected off of the fifth metatarsal to expose it.  Upon inspection the head of the fifth metatarsal was crumbly, soft and necrotic consistent with osteomyelitis.  Utilizing a 15 blade an osteotomy was made at the midshaft and a dorsal distal to proximal plantar orientation.  It was passed off the table for pathology examination.  The remaining fifth metatarsal base was of good heart quality with bleeding bone.  No concerns for residual osteomyelitis.  Inspection was brought back to the foot.  There is significant nonviable tissue.  It was sharply debrided down to  bleeding healthy tissue.  The incision was then copiously vascular in sterile saline.  Deep intraoperative cultures were then taken to tailor antibiotics.    Given the above infection as well as large soft tissue defect the decision to apply a wound vacuum was made.  The wound measured approximately 5 x 3 x 3.5 cm.  Approximately 5 black sponges were utilized within the wound VAC.  It was secured in place with tape.  The wound vacuum was set to 125 continuous suction.  Adequate seal was noted.     The foot was then cleansed and dried.  A postoperative injection consisting of 6 cc of 1% lidocaine was injected to the right foot.  A dressing consisting of 4 x 4's Jarad Ace wrap was applied to the right foot.  Patient tolerated procedure well with no immediate complications.     Dr. Berwer was present for the entire procedure.    Patient Disposition:  PACU          SIGNATURE: Bang Quinteros DPM  DATE: May 16, 2025  TIME: 2:11 PM

## 2025-05-16 NOTE — ASSESSMENT & PLAN NOTE
78 yo male ex-smoker w/ a hx of DM II (A1c 9.3), HTN, HLD, asthma, CVA (9/2024), PAF (eliquis), cardiomyopathy and PAD presented to [facility] on 5/7/25 w/ R foot pain x1 week and non-healing R 5th met wound x4-5 weeks. Pt admitted and started on ABX. Pt seen by podiatry w/ plans for R partial 5th ray resection on Fri (5/16). Pt was transferred to [facility] on 5/11/25 for vascular surgical intervention.     Vascular surgery consulted for worsening R 5th met wound in the setting of PAD. CTA abd shows L CFA/SFA stenosis w/ focal occlusions and AT/peroneal occlusions w/ reconstitution. LEAD shows R PTA occlusion and R RIC: 0.42/15/16.    Pt is S/P R CFA/SFA endarterectomy w/ bovine pericardial patch, DCB and stenting of SFA, and angio of AT on 5/13.     Diagnostics:  -5/6/25 CTA abd w/ runoff: B/L EDY/EIA/IIA patent w/ atherosclerosis. Right: Focal high-grade stenosis of distal R CFA and diffuse atherosclerotic disease of R SFA resulting in moderate to severe stenoses with focal near complete occlusions at the proximal and distal segments. Short segment occlusions of proximal R AT and peroneal arteries with reconstitution. R PTA is occluded. Left: Occluded L SFA with reconstitution. L YUNG and PTA occluded. One-vessel runoff LLE through peroneal artery.  -4/18/25 LEAD: Right: >75% prox SFA and 50-75% dSFA stenosis. Distal PTA occlusion. R RIC: 0.42/15/16. Left: PTA and YUNG occlusions. L RIC: 1.06/85/45.   -4/17/25 MRI R foot: Possible early OM in R 5th met    Plan:  -R 5th met wound (+) OM in the setting of PAD  -S/P R CFA/SFA endarterectomy w/ bovine pericardial patch, DCB and stenting of SFA, and angio of AT on 5/13 (POD #3)  -Continue plavix (new SFA stents) and lipitor for medical management of PAD  -Continue ABX for R foot wound per primary team   -Continue to hold eliquis until podiatric procedure completed; continue heparin gtt per primary team  -Podiatry following w/ plans for R partial 5th ray resection today, Fri  (5/16); input appreciated  -Continue medical management per primary team  -Pt is stable for discharge from a vascular surgery standpoint  -Will continue to follow peripherally while pt remains hospitalized  -Plan follow up outpt in vascular surgery office; appt scheduled for 6/5/25  -Will discuss w/ Dr. Cee

## 2025-05-16 NOTE — ANESTHESIA POSTPROCEDURE EVALUATION
Post-Op Assessment Note    CV Status:  Stable    Pain management: adequate       Mental Status:  Alert and awake   Hydration Status:  Euvolemic   PONV Controlled:  Controlled   Airway Patency:  Patent     Post Op Vitals Reviewed: Yes    No anethesia notable event occurred.    Staff: Anesthesiologist         Last Filed PACU Vitals:  Vitals Value Taken Time   Temp 98.7 °F (37.1 °C) 05/16/25 14:10   Pulse 70 05/16/25 14:47   /57 05/16/25 14:40   Resp 18 05/16/25 14:40   SpO2 93 % 05/16/25 14:47   Vitals shown include unfiled device data.    Modified Casi:     Vitals Value Taken Time   Activity 2 05/16/25 14:40   Respiration 2 05/16/25 14:40   Circulation 2 05/16/25 14:40   Consciousness 2 05/16/25 14:40   Oxygen Saturation 2 05/16/25 14:40     Modified Casi Score: 10

## 2025-05-16 NOTE — ASSESSMENT & PLAN NOTE
Continue atenolol.  Anticoagulation: Apixaban on hold for anticipated vascular procedure  On heparin infusion but will restart apixaban later this evening

## 2025-05-16 NOTE — PLAN OF CARE
Problem: PAIN - ADULT  Goal: Verbalizes/displays adequate comfort level or baseline comfort level  Description: Interventions:- Encourage patient to monitor pain and request assistance- Assess pain using appropriate pain scale- Administer analgesics based on type and severity of pain and evaluate response- Implement non-pharmacological measures as appropriate and evaluate response- Consider cultural and social influences on pain and pain management- Notify physician/advanced practitioner if interventions unsuccessful or patient reports new pain  Outcome: Progressing     Problem: DISCHARGE PLANNING  Goal: Discharge to home or other facility with appropriate resources  Description: INTERVENTIONS:- Identify barriers to discharge w/patient and caregiver- Arrange for needed discharge resources and transportation as appropriate- Identify discharge learning needs (meds, wound care, etc.)- Arrange for interpretive services to assist at discharge as needed- Refer to Case Management Department for coordinating discharge planning if the patient needs post-hospital services based on physician/advanced practitioner order or complex needs related to functional status, cognitive ability, or social support system  Outcome: Progressing     Problem: SAFETY ADULT  Goal: Patient will remain free of falls  Description: INTERVENTIONS:  - Educate patient/family on patient safety including physical limitations  - Instruct patient to call for assistance with activity   - Consult OT/PT to assist with strengthening/mobility   - Keep Call bell within reach  - Keep bed low and locked with side rails adjusted as appropriate  - Keep care items and personal belongings within reach  - Initiate and maintain comfort rounds  - Make Fall Risk Sign visible to staff  - Offer Toileting every 2 Hours, in advance of need  - Initiate/Maintain bed alarm  - Obtain necessary fall risk management equipment:   - Apply yellow socks and bracelet for high fall  risk patients  - Consider moving patient to room near nurses station  Outcome: Progressing  Goal: Maintain or return to baseline ADL function  Description: INTERVENTIONS:  -  Assess patient's ability to carry out ADLs; assess patient's baseline for ADL function and identify physical deficits which impact ability to perform ADLs (bathing, care of mouth/teeth, toileting, grooming, dressing, etc.)  - Assess/evaluate cause of self-care deficits   - Assess range of motion  - Assess patient's mobility; develop plan if impaired  - Assess patient's need for assistive devices and provide as appropriate  - Encourage maximum independence but intervene and supervise when necessary  - Involve family in performance of ADLs  - Assess for home care needs following discharge   - Consider OT consult to assist with ADL evaluation and planning for discharge  - Provide patient education as appropriate  Outcome: Progressing  Goal: Maintains/Returns to pre admission functional level  Description: INTERVENTIONS:  - Perform AM-PAC 6 Click Basic Mobility/ Daily Activity assessment daily.  - Set and communicate daily mobility goal to care team and patient/family/caregiver.   - Collaborate with rehabilitation services on mobility goals if consulted  - Perform Range of Motion 4 times a day.  - Reposition patient every 3 hours.  - Dangle patient 3 times a day  - Stand patient 3 times a day  - Ambulate patient 3 times a day  - Out of bed to chair 3 times a day   - Out of bed for meals 3 times a day  - Out of bed for toileting  - Record patient progress and toleration of activity level   Outcome: Progressing     Problem: SKIN/TISSUE INTEGRITY - ADULT  Goal: Skin Integrity remains intact(Skin Breakdown Prevention)  Description: Assess:-Perform Sae assessment -Clean and moisturize skin -Inspect skin when repositioning, toileting, and assisting with ADLS-Assess under medical devices -Assess extremities for adequate circulation and sensation Bed  Management:-Have minimal linens on bed & keep smooth, unwrinkled-Change linens as needed when moist or perspiring-Avoid sitting or lying in one position for more than 2 hours while in bed-Keep HOB at 30 degrees Toileting:-Offer bedside commode-Assess for incontinence -Use incontinent care products after each incontinent episode Activity:-Mobilize patient 3 times a day-Encourage activity and walks on unit-Encourage or provide ROM exercises -Turn and reposition patient every 2 Hours-Use appropriate equipment to lift or move patient in bed-Instruct/ Assist with weight shifting  when out of bed in chair-Consider limitation of chair time 2 hour intervalsSkin Care:-Avoid use of baby powder, tape, friction and shearing, hot water or constrictive clothing-Relieve pressure over bony prominences Do not massage red bony areasNext Steps:-Teach patient strategies to minimize risks Consider consults to  interdisciplinary teams   Outcome: Progressing  Goal: Incision(s), wounds(s) or drain site(s) healing without S/S of infection  Description: INTERVENTIONS- Assess and document dressing, incision, wound bed, drain sites and surrounding tissue- Provide patient and family education- Perform skin care/dressing changes every shift  Outcome: Progressing  Goal: Pressure injury heals and does not worsen  Description: Interventions:- Implement low air loss mattress or specialty surface (Criteria met)- Apply silicone foam dressing- Instruct/assist with weight shifting every 120 minutes when in chair - Limit chair time to 2 hour intervals- Use special pressure reducing interventions such as turning when in chair - Apply fecal or urinary incontinence containment device - Perform passive or active ROM every 2 hours- Turn and reposition patient & offload bony prominences every 2 hours - Utilize friction reducing device or surface for transfers - Consider consults to  interdisciplinary teams such as wound- Use incontinent care products after  each incontinent episode such as wipes- Consider nutrition services referral as needed  Outcome: Progressing     Problem: METABOLIC, FLUID AND ELECTROLYTES - ADULT  Goal: Electrolytes maintained within normal limits  Description: INTERVENTIONS:  - Monitor labs and assess patient for signs and symptoms of electrolyte imbalances  - Administer electrolyte replacement as ordered  - Monitor response to electrolyte replacements, including repeat lab results as appropriate  - Instruct patient on fluid and nutrition as appropriate  Outcome: Progressing     Problem: Prexisting or High Potential for Compromised Skin Integrity  Goal: Skin integrity is maintained or improved  Description: INTERVENTIONS:  - Identify patients at risk for skin breakdown  - Assess and monitor skin integrity  - Assess and monitor nutrition and hydration status  - Monitor labs   - Assess for incontinence   - Turn and reposition patient  - Assist with mobility/ambulation  - Relieve pressure over bony prominences  - Avoid friction and shearing  - Provide appropriate hygiene as needed including keeping skin clean and dry  - Evaluate need for skin moisturizer/barrier cream  - Collaborate with interdisciplinary team   - Patient/family teaching  - Consider wound care consult   Outcome: Progressing

## 2025-05-16 NOTE — CASE MANAGEMENT
Case Management Discharge Planning Note    Patient name Gold Van  Location OR POOL/OR POOL MRN [id number]  : 1945 Date 2025       Current Admission Date: 2025  Current Admission Diagnosis:Sepsis without acute organ dysfunction ([facility])   Patient Active Problem List    Diagnosis Date Noted    Pulmonary hypertension (HCC) 2025    Sepsis without acute organ dysfunction ([facility]) 2025    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 ([facility]) 2025    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 ([facility]) 2025    PAF (paroxysmal atrial fibrillation) ([facility]) 2025    Cardiomyopathy ([facility]) 2025    Ulcer of right foot with fat layer exposed ([facility]) 2025    Severe peripheral arterial disease ([facility]) 2024    Moderate protein-calorie malnutrition ([facility]) 10/09/2024    Gastroesophageal reflux disease without esophagitis 10/04/2024    Impaired mobility and activities of daily living 10/04/2024    Neuropathy 10/04/2024    Foot drop, left 10/04/2024    Abnormal echocardiogram 10/03/2024    CVA (cerebrovascular accident) (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) 2024    Non-recurrent bilateral inguinal hernia without obstruction or gangrene 2024    Pruritus 2024    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 2021    Hyponatremia 2021    Pulmonary nodule 2021    Type 2 diabetes mellitus with complication, without long-term current use of insulin ([facility]) 10/23/2017    Essential hypertension 10/23/2017    Mild intermittent asthma without complication 10/23/2017    Mixed hyperlipidemia 10/23/2017      LOS (days): 5  Geometric Mean LOS (GMLOS) (days): 5  Days to  GMLOS:0.4     OBJECTIVE:  Risk of Unplanned Readmission Score: 24.89         Current admission status: Inpatient   Preferred Pharmacy:   [facility] [place] - [address]  [address]  [place] [address]  Phone: [phone] Fax: [phone]    [facility] - [place] - [address]  [address]  [place] [address]  Phone: [phone] Fax: [phone]    [facility] ([facility]) #[id number] [place], [place] - [address] [facility][1]  [address] [place] [id number]  Phone: [phone] Fax: [phone]    Primary Care Provider: Shayne Diaz MD    Primary Insurance: [employer]  Secondary Insurance:     DISCHARGE DETAILS:    Discharge planning discussed with:: pt and pt's wife  Freedom of Choice: Yes  Comments - Freedom of Choice: Discussed reasoning for [facility] upon d/c with therapy to re-see pt post op from amputation.  Both agreeable to [facility] should this still be recommended (had been recommended previously) and if STR is recommended would be open to this wishing for [facility] due to closeness to home and has been in the past.  CM to follow up on post op recommendations  CM contacted family/caregiver?: Yes  Were Treatment Team discharge recommendations reviewed with patient/caregiver?: Yes  Did patient/caregiver verbalize understanding of patient care needs?: Yes       Contacts  Patient Contacts: Mya Van  Relationship to Patient:: Family  Contact Method: In Person  Reason/Outcome: Discharge Planning, Referral    Requested Home Health Care         Is the patient interested in HHC at discharge?: Yes  Home Health Discipline requested:: Nursing, Physical Therapy, Occupational Therapy  Home Health Agency Name:: [facility]  Home Health Follow-Up Provider:: PCP  Home Health Services Needed:: Evaluate Functional Status and Safety, Gait/ADL Training, Strengthening/Theraputic Exercises to Improve Function, Post-Op Care  and Assessment, Wound/Ostomy Care  Homebound Criteria Met:: Requires the Assistance of Another Person for Safe Ambulation or to Leave the Home, Uses an Assist Device (i.e. cane, walker, etc)  Supporting Clincal Findings:: Limited Endurance    DME Referral Provided  Referral made for DME?: No         Would you like to participate in our [facility] service program?  : No - Declined    Treatment Team Recommendation: Home with Home Health Care  Discharge Destination Plan:: Home with Home Health Care ([facility])  Transport at Discharge : Auto with designated , Family                             IMM Given (Date):: 05/16/25  IMM Given to:: Patient

## 2025-05-16 NOTE — PROGRESS NOTES
Treatment plan    Plan of care discussed with spouse at bedside.  Questions answered    Bean Sweeney,  FACP

## 2025-05-16 NOTE — PROGRESS NOTES
Progress Note - Hospitalist   Name: Gold Van 79 y.o. male I MRN: [id number]  Unit/Bed#: [place] [address] -01 I Date of Admission: 5/11/2025   Date of Service: 5/16/2025 I Hospital Day: 5    Assessment & Plan  Sepsis without acute organ dysfunction (HCC)  History of diabetes mellitus paroxysmal atrial fibrillation asthma cardiomyopathy and PAD presented to [facility] with right foot wound  Met sepsis criteria at [facility]  Currently on cefazolin/metronidazole  Blood cultures no growth 5 days  Transferred here for vascular surgery.  Podiatry planning on right partial fifth ray resection later today  Severe peripheral arterial disease (HCC)  Transferred here for vascular surgery intervention  Underwent right CFA SFA endarterectomy/stenting 5/13/2025  Aspirin discontinued.  Started on clopidogrel for stents  Type 2 diabetes mellitus with complication, without long-term current use of insulin ([facility])  Lab Results   Component Value Date    HGBA1C 9.3 (H) 04/09/2025     Recent Labs     05/15/25  1140 05/15/25  1556 05/15/25  2137 05/16/25  0818   POCGLU 332* 445* 276* 229*     Prior to admission glipizide linagliptin and metformin  A1c 9.3.  Endocrinology consulted by vascular surgery  Started on glargine 10 units of lispro 5 units 3 times daily.  Further increased glargine to 20 units  Currently on metformin.  Anticipate restarting glipizide postoperatively  PAF (paroxysmal atrial fibrillation) (HCC)  Continue atenolol.  Anticoagulation: Apixaban on hold for anticipated vascular procedure  On heparin infusion but will restart apixaban later this evening  Essential hypertension  Continue amlodipine atenolol and lisinopril  Mild intermittent asthma without complication  Prior to admission on [facility].  No exacerbation  Pruritus  Chronic pruritus follows with dermatology as an outpatient on prednisone 5 mg daily  Cardiomyopathy (HCC)  Last known LVEF 50% echocardiogram 2024  Currently compensated.    VTE Pharmacologic  Prophylaxis: VTE Score: 5 High Risk (Score >/= 5) - Pharmacological DVT Prophylaxis Contraindicated. Sequential Compression Devices Ordered.    Mobility:   Basic Mobility Inpatient Raw Score: 18  JH-HLM Goal: 6: Walk 10 steps or more  JH-HLM Achieved: 8: Walk 250 feet ot more  JH-HLM Goal achieved. Continue to encourage appropriate mobility.    Patient Centered Rounds: I have performed bedside rounds with nursing staff today.  Discussions with Specialists or Other Care Team Provider: Case management    Education and Discussions with Family / Patient:     Current Length of Stay: 5 day(s)  Current Patient Status: Inpatient   Certification Statement: The patient will continue to require additional inpatient hospital stay due to podiatry surgery later today  Discharge Plan: Anticipate discharge in 48-72 hrs to home with home services.    Code Status: Level 1 - Full Code    Subjective   Patient seen and examined.  No new complaints    Objective   Vitals:   Temp (24hrs), Av.8 °F (37.7 °C), Min:98.2 °F (36.8 °C), Max:101.2 °F (38.4 °C)    Temp:  [98.2 °F (36.8 °C)-101.2 °F (38.4 °C)] 99.4 °F (37.4 °C)  HR:  [] 87  Resp:  [16-20] 20  BP: (110-131)/(60-64) 110/61  SpO2:  [92 %-97 %] 92 %  Body mass index is 22.69 kg/m².     Input and Output Summary (last 24 hours):     Intake/Output Summary (Last 24 hours) at 2025 0955  Last data filed at 2025 0201  Gross per 24 hour   Intake 480 ml   Output 950 ml   Net -470 ml       Physical Exam  Vitals reviewed.   Constitutional:       General: He is not in acute distress.  HENT:      Head: Atraumatic.     Eyes:      General: No scleral icterus.     Extraocular Movements: Extraocular movements intact.       Cardiovascular:      Rate and Rhythm: Regular rhythm.      Heart sounds:      No gallop.   Pulmonary:      Effort: Pulmonary effort is normal. No respiratory distress.   Abdominal:      General: Bowel sounds are normal.      Palpations: Abdomen is soft.       Tenderness: There is no abdominal tenderness.     Musculoskeletal:         General: No deformity.     Skin:     General: Skin is warm.      Coloration: Skin is not jaundiced.     Neurological:      General: No focal deficit present.      Mental Status: He is alert.      Motor: No weakness.     Psychiatric:         Mood and Affect: Mood normal.       Lines/Drains:  Invasive Devices       Peripheral Intravenous Line  Duration             Peripheral IV 05/13/25 Left;Ventral (anterior) Forearm 2 days    Peripheral IV 05/13/25 Right;Ventral (anterior) Forearm 2 days                            Lab Results: I have reviewed the following results:   Results from last 7 days   Lab Units 05/16/25 0545 05/15/25  0433 05/14/25  0436   WBC Thousand/uL 10.50* 10.43* 11.03*   HEMOGLOBIN g/dL 7.4* 7.4* 8.4*   PLATELETS Thousands/uL 236 197 222   MCV fL 91 90 91     Results from last 7 days   Lab Units 05/16/25  0545 05/15/25  0445 05/14/25  0436 05/13/25  1512 05/13/25  0455   SODIUM mmol/L 134* 133* 134*  --  133*   POTASSIUM mmol/L 4.0 4.2 4.6  --  4.2   CHLORIDE mmol/L 103 102 104  --  101   CO2 mmol/L 25 25 23  --  26   CO2, I-STAT   --   --   --    < >  --    ANION GAP mmol/L 6 6 7  --  6   BUN mg/dL 14 18 14  --  13   CREATININE mg/dL 0.87 1.13 1.13  --  0.93   CALCIUM mg/dL 8.1* 7.9* 8.1*  --  8.7   ALBUMIN g/dL 2.6*  --  2.9*  --  3.1*   TOTAL BILIRUBIN mg/dL 0.30  --  0.38  --  0.33   ALK PHOS U/L 51  --  52  --  62   ALT U/L 4*  --  15  --  22   AST U/L 12*  --  31  --  46*   EGFR ml/min/1.73sq m 82 61 61  --  77   GLUCOSE RANDOM mg/dL 167* 228* 242*  --  206*    < > = values in this interval not displayed.     Results from last 7 days   Lab Units 05/14/25 0436   MAGNESIUM mg/dL 2.1   PHOSPHORUS mg/dL 4.0                      Results from last 7 days   Lab Units 05/16/25  0818 05/15/25  2137 05/15/25  1556 05/15/25  1140 05/15/25  0810 05/14/25  2131 05/14/25  1558 05/14/25  1021 05/14/25  0732 05/13/25  2227  05/13/25  2154 05/13/25  1852   POC GLUCOSE mg/dl 229* 276* 445* 332* 261* 285* 324* 220* 210* 286* 269* 239*               Recent Cultures (last 7 days):         Imaging:  Reviewed radiology reports from this admission including:  XR or angio leg rt  Result Date: 5/16/2025  Impression: Fluoroscopy provided for procedure guidance. Please refer to the separate procedure note for additional details. Workstation performed: [id number]       Last 24 Hours Medication List:     Current Facility-Administered Medications:     acetaminophen (TYLENOL) tablet 975 mg, Q8H SANDRA    albuterol (PROVENTIL HFA,VENTOLIN HFA) inhaler 1 puff, Q4H PRN    amLODIPine (NORVASC) tablet 5 mg, BID    [Held by provider] aspirin chewable tablet 81 mg, Daily    atenolol (TENORMIN) tablet 25 mg, Daily    atorvastatin (LIPITOR) tablet 40 mg, QPM    Budeson-Glycopyrrol-Formoterol 160-9-4.8 MCG/ACT AERO 2 puff, BID    ceFAZolin (ANCEF) IVPB (premix in dextrose) 2,000 mg 50 mL, Q8H, Last Rate: 2,000 mg (05/16/25 0531)    clopidogrel (PLAVIX) tablet 75 mg, Daily    famotidine (PEPCID) tablet 20 mg, BID    heparin (porcine) 25,000 units in 0.45% NaCl 250 mL infusion (premix), Titrated, Last Rate: Stopped (05/16/25 0624)    heparin (porcine) injection 2,000 Units, Q6H PRN    heparin (porcine) injection 4,000 Units, Q6H PRN    insulin glargine (LANTUS) subcutaneous injection 20 Units 0.2 mL, HS    insulin lispro (HumALOG/ADMELOG) 100 units/mL subcutaneous injection 1-5 Units, TID AC **AND** Fingerstick Glucose (POCT), TID AC    insulin lispro (HumALOG/ADMELOG) 100 units/mL subcutaneous injection 1-5 Units, HS    insulin lispro (HumALOG/ADMELOG) 100 units/mL subcutaneous injection 5 Units, TID With Meals    lidocaine (LIDODERM) 5 % patch 1 patch, Daily    lisinopril (ZESTRIL) tablet 20 mg, BID    metFORMIN (GLUCOPHAGE-XR) 24 hr tablet 500 mg, Daily With Dinner    metroNIDAZOLE (FLAGYL) IVPB (premix) 500 mg 100 mL, Q12H, Last Rate: 500 mg (05/16/25 0338)     montelukast (SINGULAIR) tablet 10 mg, HS    morphine injection 2 mg, Q4H PRN    multivitamin-minerals (CENTRUM) tablet 1 tablet, Daily    ondansetron (ZOFRAN) injection 4 mg, Q6H PRN    oxyCODONE (ROXICODONE) IR tablet 5 mg, Q4H PRN **OR** oxyCODONE (ROXICODONE) immediate release tablet 10 mg, Q4H PRN    predniSONE tablet 5 mg, Daily    senna (SENOKOT) tablet 8.6 mg, Daily    Administrative Statements   Today, Patient Was Seen By: Bean Sweeney, DO  I have spent a total time of 35 minutes in caring for this patient on the day of the visit/encounter including Counseling / Coordination of care, Documenting in the medical record, Reviewing/placing orders in the medical record (including tests, medications, and/or procedures), and Communicating with other healthcare professionals .    **Please Note: This note may have been constructed using a voice recognition system.**

## 2025-05-16 NOTE — PROGRESS NOTES
Podiatry - Progress Note  Patient: Gold Van 79 y.o. male   MRN: [id number]  PCP: Shayne Diaz MD  Unit/Bed#: [address]-01 Encounter: [id number]  Date Of Visit: 25    ASSESSMENT:    Gold Van is a 79 y.o. male with:    Right 5th metatarsal ulceration with underlying OM - [place] 4  R foot cellulitis  PAD  - s/p right femoral endarterectomy and antegrade endovascular intervention (DOS: 25)  Type 2 diabetes mellitus  A1c: 9.3% (25)      PLAN:    Patient to go to OR today,25, for right partial fifth ray amputation with Dr. Brewer.  Consent to be signed with surgeon prior to procedure.  Confirmed NPO status.  H&P, vitals, and current labs reviewed.  No acute changes noted.  Alternatives, risks, and complications discussed with patient.  All questions answered.  No guarantees given of outcome.  Rest of medical care per primary team.       SUBJECTIVE:     The patient was seen, evaluated, and assessed at bedside today. The patient was awake, alert, and in no acute distress. Patient confirmed NPO status. All questions and concerns regarding the surgical procedure addressed. Patient understands risks vs benefits of procedure and remains amenable with plan for surgery today. Patient denies N/V/F/chills/SOB/CP.      OBJECTIVE:     Vitals:   /64   Pulse 95   Temp (!) 100.6 °F (38.1 °C)   Resp 18   Ht 6' (1.829 m)   Wt 75.2 kg (165 lb 12.6 oz)   SpO2 95%   BMI 22.48 kg/m²     Temp (24hrs), Av.7 °F (37.6 °C), Min:98.2 °F (36.8 °C), Max:101.2 °F (38.4 °C)      Physical Exam:     General:  Alert, cooperative, and in no distress.  Lower extremity exam:  Cardiovascular status at baseline.  Neurological status at baseline.  Musculoskeletal status at baseline. No calf tenderness noted bilaterally. Dressing left intact to the Operating Room.     Additional Data:     Labs:    Results from last 7 days   Lab Units 05/15/25  0433   WBC Thousand/uL 10.43*   HEMOGLOBIN g/dL 7.4*  "  HEMATOCRIT % 22.5*   PLATELETS Thousands/uL 197     Results from last 7 days   Lab Units 05/15/25  0445 05/14/25  0436 05/13/25  1512   POTASSIUM mmol/L 4.2 4.6  --    CHLORIDE mmol/L 102 104  --    CO2 mmol/L 25 23  --    CO2, I-STAT mmol/L  --   --  22   BUN mg/dL 18 14  --    CREATININE mg/dL 1.13 1.13  --    CALCIUM mg/dL 7.9* 8.1*  --    ALK PHOS U/L  --  52  --    ALT U/L  --  15  --    AST U/L  --  31  --    GLUCOSE, ISTAT mg/dl  --   --  262*           * I Have Reviewed All Lab Data Listed Above.    Recent Cultures (last 7 days):               Imaging: I have personally reviewed pertinent films in PACS  EKG, Pathology, and Other Studies: I have personally reviewed pertinent reports.    ** Please Note: Portions of the record may have been created with voice recognition software. Occasional wrong word or \"sound a like\" substitutions may have occurred due to the inherent limitations of voice recognition software. Read the chart carefully and recognize, using context, where substitutions have occurred. **      "

## 2025-05-16 NOTE — PLAN OF CARE
Problem: PAIN - ADULT  Goal: Verbalizes/displays adequate comfort level or baseline comfort level  Description: Interventions:- Encourage patient to monitor pain and request assistance- Assess pain using appropriate pain scale- Administer analgesics based on type and severity of pain and evaluate response- Implement non-pharmacological measures as appropriate and evaluate response- Consider cultural and social influences on pain and pain management- Notify physician/advanced practitioner if interventions unsuccessful or patient reports new pain  Outcome: Progressing     Problem: DISCHARGE PLANNING  Goal: Discharge to home or other facility with appropriate resources  Description: INTERVENTIONS:- Identify barriers to discharge w/patient and caregiver- Arrange for needed discharge resources and transportation as appropriate- Identify discharge learning needs (meds, wound care, etc.)- Arrange for interpretive services to assist at discharge as needed- Refer to Case Management Department for coordinating discharge planning if the patient needs post-hospital services based on physician/advanced practitioner order or complex needs related to functional status, cognitive ability, or social support system  Outcome: Progressing     Problem: INFECTION - ADULT  Goal: Absence or prevention of progression during hospitalization  Description: INTERVENTIONS:  - Assess and monitor for signs and symptoms of infection  - Monitor lab/diagnostic results  - Monitor all insertion sites, i.e. indwelling lines, tubes, and drains  - Monitor endotracheal if appropriate and nasal secretions for changes in amount and color  - Luther appropriate cooling/warming therapies per order  - Administer medications as ordered  - Instruct and encourage patient and family to use good hand hygiene technique  - Identify and instruct in appropriate isolation precautions for identified infection/condition  Outcome: Progressing  Goal: Absence of  fever/infection during neutropenic period  Description: INTERVENTIONS:  - Monitor WBC    Outcome: Progressing     Problem: SAFETY ADULT  Goal: Patient will remain free of falls  Description: INTERVENTIONS:  - Educate patient/family on patient safety including physical limitations  - Instruct patient to call for assistance with activity   - Consult OT/PT to assist with strengthening/mobility   - Keep Call bell within reach  - Keep bed low and locked with side rails adjusted as appropriate  - Keep care items and personal belongings within reach  - Initiate and maintain comfort rounds  - Make Fall Risk Sign visible to staff  - Offer Toileting every 2 Hours, in advance of need  - Initiate/Maintain bed alarm  - Obtain necessary fall risk management equipment:   - Apply yellow socks and bracelet for high fall risk patients  - Consider moving patient to room near nurses station  Outcome: Progressing  Goal: Maintain or return to baseline ADL function  Description: INTERVENTIONS:  -  Assess patient's ability to carry out ADLs; assess patient's baseline for ADL function and identify physical deficits which impact ability to perform ADLs (bathing, care of mouth/teeth, toileting, grooming, dressing, etc.)  - Assess/evaluate cause of self-care deficits   - Assess range of motion  - Assess patient's mobility; develop plan if impaired  - Assess patient's need for assistive devices and provide as appropriate  - Encourage maximum independence but intervene and supervise when necessary  - Involve family in performance of ADLs  - Assess for home care needs following discharge   - Consider OT consult to assist with ADL evaluation and planning for discharge  - Provide patient education as appropriate  Outcome: Progressing  Goal: Maintains/Returns to pre admission functional level  Description: INTERVENTIONS:  - Perform AM-PAC 6 Click Basic Mobility/ Daily Activity assessment daily.  - Set and communicate daily mobility goal to care team  and patient/family/caregiver.   - Collaborate with rehabilitation services on mobility goals if consulted  - Perform Range of Motion 4 times a day.  - Reposition patient every 3 hours.  - Dangle patient 3 times a day  - Stand patient 3 times a day  - Ambulate patient 3 times a day  - Out of bed to chair 3 times a day   - Out of bed for meals 3 times a day  - Out of bed for toileting  - Record patient progress and toleration of activity level   Outcome: Progressing     Problem: Knowledge Deficit  Goal: Patient/family/caregiver demonstrates understanding of disease process, treatment plan, medications, and discharge instructions  Description: Complete learning assessment and assess knowledge base.  Interventions:  - Provide teaching at level of understanding  - Provide teaching via preferred learning methods  Outcome: Progressing     Problem: SKIN/TISSUE INTEGRITY - ADULT  Goal: Skin Integrity remains intact(Skin Breakdown Prevention)  Description: Assess:-Perform Sae assessment -Clean and moisturize skin -Inspect skin when repositioning, toileting, and assisting with ADLS-Assess under medical devices -Assess extremities for adequate circulation and sensation Bed Management:-Have minimal linens on bed & keep smooth, unwrinkled-Change linens as needed when moist or perspiring-Avoid sitting or lying in one position for more than 2 hours while in bed-Keep HOB at 30 degrees Toileting:-Offer bedside commode-Assess for incontinence -Use incontinent care products after each incontinent episode Activity:-Mobilize patient 3 times a day-Encourage activity and walks on unit-Encourage or provide ROM exercises -Turn and reposition patient every 2 Hours-Use appropriate equipment to lift or move patient in bed-Instruct/ Assist with weight shifting  when out of bed in chair-Consider limitation of chair time 2 hour intervalsSkin Care:-Avoid use of baby powder, tape, friction and shearing, hot water or constrictive clothing-Relieve  pressure over bony prominences Do not massage red bony areasNext Steps:-Teach patient strategies to minimize risks Consider consults to  interdisciplinary teams   Outcome: Progressing  Goal: Incision(s), wounds(s) or drain site(s) healing without S/S of infection  Description: INTERVENTIONS- Assess and document dressing, incision, wound bed, drain sites and surrounding tissue- Provide patient and family education- Perform skin care/dressing changes every shift  Outcome: Progressing  Goal: Pressure injury heals and does not worsen  Description: Interventions:- Implement low air loss mattress or specialty surface (Criteria met)- Apply silicone foam dressing- Instruct/assist with weight shifting every 120 minutes when in chair - Limit chair time to 2 hour intervals- Use special pressure reducing interventions such as turning when in chair - Apply fecal or urinary incontinence containment device - Perform passive or active ROM every 2 hours- Turn and reposition patient & offload bony prominences every 2 hours - Utilize friction reducing device or surface for transfers - Consider consults to  interdisciplinary teams such as wound- Use incontinent care products after each incontinent episode such as wipes- Consider nutrition services referral as needed  Outcome: Progressing     Problem: METABOLIC, FLUID AND ELECTROLYTES - ADULT  Goal: Electrolytes maintained within normal limits  Description: INTERVENTIONS:  - Monitor labs and assess patient for signs and symptoms of electrolyte imbalances  - Administer electrolyte replacement as ordered  - Monitor response to electrolyte replacements, including repeat lab results as appropriate  - Instruct patient on fluid and nutrition as appropriate  Outcome: Progressing     Problem: HEMATOLOGIC - ADULT  Goal: Maintains hematologic stability  Description: INTERVENTIONS  - Assess for signs and symptoms of bleeding or hemorrhage  - Monitor labs  - Administer supportive blood  products/factors as ordered and appropriate  Outcome: Progressing     Problem: Nutrition/Hydration-ADULT  Goal: Nutrient/Hydration intake appropriate for improving, restoring or maintaining nutritional needs  Description: Monitor and assess patient's nutrition/hydration status for malnutrition. Collaborate with interdisciplinary team and initiate plan and interventions as ordered.  Monitor patient's weight and dietary intake as ordered or per policy. Utilize nutrition screening tool and intervene as necessary. Determine patient's food preferences and provide high-protein, high-caloric foods as appropriate. INTERVENTIONS:- Monitor oral intake, urinary output, labs, and treatment plans- Assess nutrition and hydration status and recommend course of action- Evaluate amount of meals eaten- Assist patient with eating if necessary - Allow adequate time for meals- Recommend/ encourage appropriate diets, oral nutritional supplements, and vitamin/mineral supplements- Order, calculate, and assess calorie counts as needed- Recommend, monitor, and adjust tube feedings and TPN/PPN based on assessed needs- Assess need for intravenous fluids- Provide specific nutrition/hydration education as appropriate- Include patient/family/caregiver in decisions related to nutrition  Outcome: Progressing     Problem: Prexisting or High Potential for Compromised Skin Integrity  Goal: Skin integrity is maintained or improved  Description: INTERVENTIONS:  - Identify patients at risk for skin breakdown  - Assess and monitor skin integrity  - Assess and monitor nutrition and hydration status  - Monitor labs   - Assess for incontinence   - Turn and reposition patient  - Assist with mobility/ambulation  - Relieve pressure over bony prominences  - Avoid friction and shearing  - Provide appropriate hygiene as needed including keeping skin clean and dry  - Evaluate need for skin moisturizer/barrier cream  - Collaborate with interdisciplinary team   -  Patient/family teaching  - Consider wound care consult   Outcome: Progressing

## 2025-05-16 NOTE — ANESTHESIA POSTPROCEDURE EVALUATION
Post-Op Assessment Note    CV Status:  Stable  Pain Score: 0    Pain management: adequate       Mental Status:  Alert and awake   Hydration Status:  Euvolemic   PONV Controlled:  Controlled   Airway Patency:  Patent     Post Op Vitals Reviewed: Yes    No anethesia notable event occurred.    Staff: CRNA           Last Filed PACU Vitals:  Vitals Value Taken Time   Temp 98.3    Pulse 69    /57    Resp 16    SpO2 96%

## 2025-05-16 NOTE — ASSESSMENT & PLAN NOTE
History of diabetes mellitus paroxysmal atrial fibrillation asthma cardiomyopathy and PAD presented to [facility] with right foot wound  Met sepsis criteria at [facility]  Currently on cefazolin/metronidazole  Blood cultures no growth 5 days  Transferred here for vascular surgery.  Podiatry planning on right partial fifth ray resection later today

## 2025-05-16 NOTE — WOUND OSTOMY CARE
Consult Note - Wound   Gold Van 79 y.o. male MRN: [id number]  Unit/Bed#: [place] [address] -01 Encounter: [id number]      History and Present Illness:  79 year old male presented to the hospital with diabetic foot ulcer with osteomyelitis and cellulitis.  Initially admitted to [facility] and transferred to [facility] for vascular evaluation.  Patient's history significant for CVA, cardiomyopathy, HTN, atrial fibrillation, DM, PAD.    Patient status post right femoral endarterectomy and antegrade endovascular intervention on 5/13/25.    Assessment Findings:   Patient agreeable to assessment.  He is sitting up in the chair, able to stand with assist and walker.  Continent of bowel and bladder.  Nutrition team following, dietary supplements ordered.  Left heel intact.  Skin generally fragile with thin epidermis and loss of subcutaneous tissue.  Scattered ecchymosis to arms.  Right thigh incision with Prevena VAC dressing intact.  Left arm skin tear--beefy red, partial thickness skin loss with scant bloody drainage.  Miranda-wound intact with ecchymosis.  Left pretibial skin tear--pink and yellow full thickness tissue loss with scant serosanguinous drainage.  Miranda-wound intact with ecchymosis.  Irritant contact dermatitis to sacrum secondary to perspiration and friction--scaling and maceration with blanchable pink underlying tissue.  Miranda-wound pink, intact, and blanchable.  Non-tender on palpation.  Irregularly shaped and not over a bony prominence.  Diabetic ulcer to right heel--patient with small, brown eschar and slow to mike light purple hyperpigmentation/erythema.  No drainage, induration, or fluctuance.  Boggy.  Tender to touch per patient.    Right foot wound per podiatry team.  Scheduled for right partial 5th ray amputation today.      See flowsheet for wound details.    Wound Care Plan:   1-Silicone Cream/Hydraguard lotion to bilateral heels twice daily and as needed.  2-Elevate  heels off of bed/chair surface--offloading heel boots  3-Offloading air cushion in chair when out of bed.  4-Apply moisturizing skin cream to body daily and as needed.  5-Turn/reposition every 2 hours for pressure re-distribution on skin.   6-Accumax pump to bed mattress.  7-Right foot per podiatry team recommendations.  8-Right anterior thigh per vascular team recommendations.  9-Sacrum--cleanse with Vashe, pat dry.  Apply calcium alginate (Melgisorb) and cover with silicone bordered foam dressing.  Change dressing every other day and as needed.  10-Left arm and left pretibial skin tears--cleanse with Vashe soak, remove, pat dry.  Apply Dermagran and cover with silicone bordered foam dressing.  Change dressing every other day and as needed.    Wound care team to follow.  Plan of care reviewed with primary RN.    Wound 04/08/25 Diabetic Ulcer Heel Right (Active)   Wound Image   05/16/25 0902   Enter Mills score: Mills Grade 0: No open lesion or a preulcerative lesion - may have a deformity or cellulitis 05/16/25 0902   Wound Description Dry;Brown 05/16/25 0902   Wound Length (cm) 0.6 cm 05/16/25 0902   Wound Width (cm) 1.5 cm 05/16/25 0902   Wound Depth (cm) 0 cm 05/16/25 0902   Wound Surface Area (cm^2) 0.71 cm^2 05/16/25 0902   Wound Volume (cm^3) 0 cm^3 05/16/25 0902   Calculated Wound Volume (cm^3) 0 cm^3 05/16/25 0902   Change in Wound Size % 100 05/16/25 0902   Drainage Amount None 05/16/25 0902   Miranda-wound Assessment Erythema;Hyperpigmented 05/16/25 0902   Treatments Elevated 05/16/25 0902       Wound 05/14/25 Traumatic Skin Tear Arm Anterior;Left;Proximal (Active)   Wound Image   05/16/25 0927   Wound Description Beefy red 05/16/25 0927   Non-staged Wound Description Partial thickness 05/16/25 0927   Wound Length (cm) 0.7 cm 05/16/25 0927   Wound Width (cm) 0.7 cm 05/16/25 0927   Wound Depth (cm) 0.1 cm 05/16/25 0927   Wound Surface Area (cm^2) 0.38 cm^2 05/16/25 0927   Wound Volume (cm^3) 0.026 cm^3  05/16/25 0927   Calculated Wound Volume (cm^3) 0.05 cm^3 05/16/25 0927   Change in Wound Size % 75 05/16/25 0927   Drainage Amount Scant 05/16/25 0927   Drainage Description Bloody 05/16/25 0927   Miranda-wound Assessment Intact 05/16/25 0927   Treatments Cleansed 05/16/25 0927   Dressing Dermagran gauze;Foam, Silicon (eg. Allevyn, etc) 05/16/25 0927   Dressing Changed Changed 05/16/25 0927   Patient Tolerance Tolerated well 05/16/25 0927   Dressing Status Dry;Clean;Intact 05/16/25 0927       Wound 05/15/25 Irritant Contact Dermatitis Perspiration Sacrum Mid (Active)   Wound Image   05/16/25 0903   Wound Description Other (Comment);[place] 05/16/25 0903   Wound Length (cm) 2.5 cm 05/16/25 0903   Wound Width (cm) 1.5 cm 05/16/25 0903   Wound Depth (cm) 0.1 cm 05/16/25 0903   Wound Surface Area (cm^2) 2.95 cm^2 05/16/25 0903   Wound Volume (cm^3) 0.196 cm^3 05/16/25 0903   Calculated Wound Volume (cm^3) 0.38 cm^3 05/16/25 0903   Drainage Amount None 05/16/25 0903   Drainage Description Bloody 05/15/25 0800   Miranda-wound Assessment Pink;Intact 05/16/25 0903   Treatments Cleansed 05/16/25 0903   Dressing Calcium Alginate;Foam, Silicon (eg. Allevyn, etc) 05/16/25 0903   Dressing Changed Changed 05/16/25 0903   Patient Tolerance Tolerated well 05/16/25 0903   Dressing Status Clean;Intact;Dry 05/16/25 0903       Wound 05/16/25 Traumatic Skin Tear Pretibial Left (Active)   Wound Image   05/16/25 0909   Wound Description Pink;Yellow 05/16/25 0909   Non-staged Wound Description Full thickness 05/16/25 0909   Wound Length (cm) 2.2 cm 05/16/25 0909   Wound Width (cm) 1.3 cm 05/16/25 0909   Wound Depth (cm) 0.1 cm 05/16/25 0909   Wound Surface Area (cm^2) 2.25 cm^2 05/16/25 0909   Wound Volume (cm^3) 0.15 cm^3 05/16/25 0909   Calculated Wound Volume (cm^3) 0.29 cm^3 05/16/25 0909   Drainage Amount Scant 05/16/25 0909   Drainage Description Serosanguineous 05/16/25 0909   Miranda-wound Assessment Purple 05/16/25 0909   Treatments Cleansed  05/16/25 0909   Dressing Dermagran gauze;Foam, Silicon (eg. Allevyn, etc) 05/16/25 0909   Dressing Changed Changed 05/16/25 0909   Patient Tolerance Tolerated well 05/16/25 0909   Dressing Status Clean;Intact;Dry 05/16/25 0909       Eladia Stone RN, BSN, CWON

## 2025-05-16 NOTE — ASSESSMENT & PLAN NOTE
Lab Results   Component Value Date    HGBA1C 9.3 (H) 04/09/2025     Recent Labs     05/15/25  1140 05/15/25  1556 05/15/25  2137 05/16/25  0818   POCGLU 332* 445* 276* 229*     Prior to admission glipizide linagliptin and metformin  A1c 9.3.  Endocrinology consulted by vascular surgery  Started on glargine 10 units of lispro 5 units 3 times daily.  Further increased glargine to 20 units  Currently on metformin.  Anticipate restarting glipizide postoperatively

## 2025-05-17 PROBLEM — D64.9 POSTOPERATIVE ANEMIA: Status: ACTIVE | Noted: 2025-05-17

## 2025-05-17 LAB
ABO GROUP BLD BPU: NORMAL
ABO GROUP BLD BPU: NORMAL
ABO GROUP BLD: NORMAL
ANION GAP SERPL CALCULATED.3IONS-SCNC: 5 MMOL/L (ref 4–13)
BLD GP AB SCN SERPL QL: NEGATIVE
BPU ID: NORMAL
BPU ID: NORMAL
BUN SERPL-MCNC: 11 MG/DL (ref 5–25)
CALCIUM SERPL-MCNC: 8.3 MG/DL (ref 8.4–10.2)
CHLORIDE SERPL-SCNC: 104 MMOL/L (ref 96–108)
CO2 SERPL-SCNC: 27 MMOL/L (ref 21–32)
CREAT SERPL-MCNC: 0.9 MG/DL (ref 0.6–1.3)
CROSSMATCH: NORMAL
CROSSMATCH: NORMAL
ERYTHROCYTE [DISTWIDTH] IN BLOOD BY AUTOMATED COUNT: 15.2 % (ref 11.6–15.1)
FOLATE SERPL-MCNC: 18.7 NG/ML
GFR SERPL CREATININE-BSD FRML MDRD: 80 ML/MIN/1.73SQ M
GLUCOSE SERPL-MCNC: 142 MG/DL (ref 65–140)
GLUCOSE SERPL-MCNC: 158 MG/DL (ref 65–140)
GLUCOSE SERPL-MCNC: 188 MG/DL (ref 65–140)
GLUCOSE SERPL-MCNC: 206 MG/DL (ref 65–140)
GLUCOSE SERPL-MCNC: 87 MG/DL (ref 65–140)
GLUCOSE SERPL-MCNC: 98 MG/DL (ref 65–140)
HCT VFR BLD AUTO: 23 % (ref 36.5–49.3)
HCT VFR BLD AUTO: 23.9 % (ref 36.5–49.3)
HGB BLD-MCNC: 7.2 G/DL (ref 12–17)
HGB BLD-MCNC: 7.7 G/DL (ref 12–17)
IRON SATN MFR SERPL: 11 % (ref 15–50)
IRON SERPL-MCNC: 18 UG/DL (ref 50–212)
MCH RBC QN AUTO: 29.3 PG (ref 26.8–34.3)
MCHC RBC AUTO-ENTMCNC: 31.3 G/DL (ref 31.4–37.4)
MCV RBC AUTO: 94 FL (ref 82–98)
PLATELET # BLD AUTO: 264 THOUSANDS/UL (ref 149–390)
PMV BLD AUTO: 10 FL (ref 8.9–12.7)
POTASSIUM SERPL-SCNC: 4.5 MMOL/L (ref 3.5–5.3)
RBC # BLD AUTO: 2.46 MILLION/UL (ref 3.88–5.62)
RH BLD: POSITIVE
SODIUM SERPL-SCNC: 136 MMOL/L (ref 135–147)
SPECIMEN EXPIRATION DATE: NORMAL
TIBC SERPL-MCNC: 163.8 UG/DL (ref 250–450)
TRANSFERRIN SERPL-MCNC: 117 MG/DL (ref 203–362)
UIBC SERPL-MCNC: 146 UG/DL (ref 155–355)
UNIT DISPENSE STATUS: NORMAL
UNIT DISPENSE STATUS: NORMAL
UNIT PRODUCT CODE: NORMAL
UNIT PRODUCT CODE: NORMAL
UNIT PRODUCT VOLUME: 350 ML
UNIT PRODUCT VOLUME: 350 ML
UNIT RH: NORMAL
UNIT RH: NORMAL
VIT B12 SERPL-MCNC: 258 PG/ML (ref 180–914)
WBC # BLD AUTO: 10.46 THOUSAND/UL (ref 4.31–10.16)

## 2025-05-17 PROCEDURE — 83540 ASSAY OF IRON: CPT | Performed by: INTERNAL MEDICINE

## 2025-05-17 PROCEDURE — 86900 BLOOD TYPING SEROLOGIC ABO: CPT | Performed by: INTERNAL MEDICINE

## 2025-05-17 PROCEDURE — 86923 COMPATIBILITY TEST ELECTRIC: CPT

## 2025-05-17 PROCEDURE — 85027 COMPLETE CBC AUTOMATED: CPT | Performed by: INTERNAL MEDICINE

## 2025-05-17 PROCEDURE — 80048 BASIC METABOLIC PNL TOTAL CA: CPT | Performed by: INTERNAL MEDICINE

## 2025-05-17 PROCEDURE — 83550 IRON BINDING TEST: CPT | Performed by: INTERNAL MEDICINE

## 2025-05-17 PROCEDURE — 99232 SBSQ HOSP IP/OBS MODERATE 35: CPT | Performed by: INTERNAL MEDICINE

## 2025-05-17 PROCEDURE — 82607 VITAMIN B-12: CPT | Performed by: INTERNAL MEDICINE

## 2025-05-17 PROCEDURE — 85014 HEMATOCRIT: CPT | Performed by: INTERNAL MEDICINE

## 2025-05-17 PROCEDURE — 86850 RBC ANTIBODY SCREEN: CPT | Performed by: INTERNAL MEDICINE

## 2025-05-17 PROCEDURE — 86901 BLOOD TYPING SEROLOGIC RH(D): CPT | Performed by: INTERNAL MEDICINE

## 2025-05-17 PROCEDURE — 99024 POSTOP FOLLOW-UP VISIT: CPT

## 2025-05-17 PROCEDURE — 82746 ASSAY OF FOLIC ACID SERUM: CPT | Performed by: INTERNAL MEDICINE

## 2025-05-17 PROCEDURE — 85018 HEMOGLOBIN: CPT | Performed by: INTERNAL MEDICINE

## 2025-05-17 PROCEDURE — 82948 REAGENT STRIP/BLOOD GLUCOSE: CPT

## 2025-05-17 PROCEDURE — NC001 PR NO CHARGE: Performed by: PODIATRIST

## 2025-05-17 RX ORDER — METRONIDAZOLE 500 MG/1
500 TABLET ORAL EVERY 12 HOURS SCHEDULED
Status: DISCONTINUED | OUTPATIENT
Start: 2025-05-17 | End: 2025-05-20

## 2025-05-17 RX ADMIN — GLIPIZIDE 10 MG: 5 TABLET, FILM COATED, EXTENDED RELEASE ORAL at 06:17

## 2025-05-17 RX ADMIN — AMLODIPINE BESYLATE 5 MG: 5 TABLET ORAL at 08:39

## 2025-05-17 RX ADMIN — LISINOPRIL 20 MG: 20 TABLET ORAL at 08:39

## 2025-05-17 RX ADMIN — PREDNISONE 5 MG: 5 TABLET ORAL at 08:39

## 2025-05-17 RX ADMIN — GLIPIZIDE 10 MG: 5 TABLET, FILM COATED, EXTENDED RELEASE ORAL at 17:15

## 2025-05-17 RX ADMIN — LISINOPRIL 20 MG: 20 TABLET ORAL at 22:18

## 2025-05-17 RX ADMIN — Medication 1 TABLET: at 08:39

## 2025-05-17 RX ADMIN — OXYCODONE HYDROCHLORIDE 10 MG: 10 TABLET ORAL at 12:54

## 2025-05-17 RX ADMIN — OXYCODONE HYDROCHLORIDE 10 MG: 10 TABLET ORAL at 19:00

## 2025-05-17 RX ADMIN — CEFAZOLIN SODIUM 2000 MG: 2 SOLUTION INTRAVENOUS at 15:27

## 2025-05-17 RX ADMIN — METFORMIN ER 500 MG 500 MG: 500 TABLET ORAL at 17:15

## 2025-05-17 RX ADMIN — ALBUTEROL SULFATE 1 PUFF: 90 AEROSOL, METERED RESPIRATORY (INHALATION) at 11:53

## 2025-05-17 RX ADMIN — OXYCODONE HYDROCHLORIDE 10 MG: 10 TABLET ORAL at 04:31

## 2025-05-17 RX ADMIN — SENNOSIDES 8.6 MG: 8.6 TABLET, FILM COATED ORAL at 08:39

## 2025-05-17 RX ADMIN — SODIUM CHLORIDE, SODIUM LACTATE, POTASSIUM CHLORIDE, AND CALCIUM CHLORIDE 75 ML/HR: .6; .31; .03; .02 INJECTION, SOLUTION INTRAVENOUS at 12:54

## 2025-05-17 RX ADMIN — ACETAMINOPHEN 975 MG: 325 TABLET, FILM COATED ORAL at 06:17

## 2025-05-17 RX ADMIN — ACETAMINOPHEN 975 MG: 325 TABLET, FILM COATED ORAL at 22:10

## 2025-05-17 RX ADMIN — ATENOLOL 25 MG: 25 TABLET ORAL at 08:39

## 2025-05-17 RX ADMIN — BUDESONIDE, GLYCOPYRROLATE, AND FORMOTEROL FUMARATE 2 PUFF: 160; 9; 4.8 AEROSOL, METERED RESPIRATORY (INHALATION) at 22:11

## 2025-05-17 RX ADMIN — INSULIN LISPRO 1 UNITS: 100 INJECTION, SOLUTION INTRAVENOUS; SUBCUTANEOUS at 22:13

## 2025-05-17 RX ADMIN — MONTELUKAST 10 MG: 10 TABLET, FILM COATED ORAL at 22:10

## 2025-05-17 RX ADMIN — CEFAZOLIN SODIUM 2000 MG: 2 SOLUTION INTRAVENOUS at 22:10

## 2025-05-17 RX ADMIN — ATORVASTATIN CALCIUM 40 MG: 40 TABLET, FILM COATED ORAL at 17:15

## 2025-05-17 RX ADMIN — CLOPIDOGREL BISULFATE 75 MG: 75 TABLET, FILM COATED ORAL at 08:39

## 2025-05-17 RX ADMIN — METRONIDAZOLE 500 MG: 500 INJECTION, SOLUTION INTRAVENOUS at 04:26

## 2025-05-17 RX ADMIN — BUDESONIDE, GLYCOPYRROLATE, AND FORMOTEROL FUMARATE 2 PUFF: 160; 9; 4.8 AEROSOL, METERED RESPIRATORY (INHALATION) at 08:43

## 2025-05-17 RX ADMIN — APIXABAN 5 MG: 5 TABLET, FILM COATED ORAL at 08:39

## 2025-05-17 RX ADMIN — FAMOTIDINE 20 MG: 20 TABLET, FILM COATED ORAL at 17:15

## 2025-05-17 RX ADMIN — LIDOCAINE 1 PATCH: 50 PATCH CUTANEOUS at 22:10

## 2025-05-17 RX ADMIN — CEFAZOLIN SODIUM 2000 MG: 2 SOLUTION INTRAVENOUS at 06:17

## 2025-05-17 RX ADMIN — FAMOTIDINE 20 MG: 20 TABLET, FILM COATED ORAL at 08:39

## 2025-05-17 RX ADMIN — APIXABAN 5 MG: 5 TABLET, FILM COATED ORAL at 17:15

## 2025-05-17 RX ADMIN — ACETAMINOPHEN 975 MG: 325 TABLET, FILM COATED ORAL at 12:55

## 2025-05-17 RX ADMIN — METRONIDAZOLE 500 MG: 500 TABLET ORAL at 22:09

## 2025-05-17 RX ADMIN — INSULIN LISPRO 1 UNITS: 100 INJECTION, SOLUTION INTRAVENOUS; SUBCUTANEOUS at 17:15

## 2025-05-17 NOTE — ASSESSMENT & PLAN NOTE
Iron studies B12 folic acid ordered  Will transfuse 1 unit PRBC as patient is now symptomatic and debilitated    Results from last 7 days   Lab Units 05/18/25  0440 05/17/25  1751 05/17/25  0542 05/16/25  1642 05/16/25  0545   HEMOGLOBIN g/dL 7.0* 7.7* 7.2*   < > 7.4*   MCV fL 94  --  94  --  91    < > = values in this interval not displayed.

## 2025-05-17 NOTE — ASSESSMENT & PLAN NOTE
Continue atenolol.  Anticoagulation: Apixaban was on hold with heparin infusion.  Apixaban has been restarted.

## 2025-05-17 NOTE — PROGRESS NOTES
Podiatry - Progress Note  Patient: Gold Van 79 y.o. male   MRN: [id number]  PCP: Shayne Diaz MD  Unit/Bed#: [address] 217-01 Encounter: [id number]  Date Of Visit: 25    ASSESSMENT:    Gold Van is a 79 y.o. male with:    Right 5th metatarsal ulceration with underlying OM (Mills 4)  - s/p right fifth ray resection (DOS: 25)  Right foot cellulitis  PAD  - s/p right femoral endarterectomy and antegrade endovascular intervention (DOS: 25)  Type 2 diabetes mellitus  - A1c: 9.3% (25)    PLAN:    Patient is POD1 s/p right partial fifth ray resection.  Assume surgical cure of osteomyelitis.   Significant strikethrough of wound VAC today.  Dressings changed at bedside  Plan for next VAC change on Monday, .  Will decide to continue wound VAC therapy versus repeat washout of the foot  Pain is well controlled. Continue current pain management regimen.  Reviewed labs/vitals. Patient is afebrile with mild leukocytosis consistent with postoperative state.  Hemoglobin at 7.2, would consider transfusion when less than 7.  Follow up intraoperative cultures, recommending continuing antibiotics at this time  Elevation on green foam wedges or pillows when non-ambulatory  Rest of care per primary team.    Weightbearing status: Partial weightbearing to Right lower extremity    Wound VAC application  1. Number of sponges: removed 5, placed 4  2. Pressure settin continuous  3. Size of wound: 8 x 3 x 3.5  4. Description of wound: granular, bleeding wound base      SUBJECTIVE:     The patient was seen, evaluated, and assessed at bedside today. The patient was awake, alert, and in no acute distress. The patient reports minimal pain at this time. Pain is well controlled with current pain management regimen. Patient reports normal appetite and using the restroom postoperatively. Patient denies N/V/F/chills/SOB/CP.    OBJECTIVE:     Vitals:   /62   Pulse 84   Temp 98.2 °F (36.8 °C)   Resp  16   Ht 6' (1.829 m)   Wt 78.2 kg (172 lb 6.4 oz)   SpO2 92%   BMI 23.38 kg/m²     Temp (24hrs), Av.2 °F (37.3 °C), Min:98.2 °F (36.8 °C), Max:101.5 °F (38.6 °C)      Physical Exam:     General:  Alert, cooperative, and in no distress.  Lower extremity exam:  Cardiovascular status at baseline.  Neurological status at baseline. No calf tenderness noted.       Right lower extremity:   Wound #: 1  Location: right lateral foot  Length 8cm: Width 3cm: Depth 3.5cm:   Deepest Tissue Noted in Base: surgical bone  Probe to Bone: yes  Peripheral Skin Description: Attached  Granulation: 100% Fibrotic Tissue: 0% Necrotic Tissue: 0%   Drainage Amount: moderate, bloody  Signs of Infection: No malodor, no purulence, no significant erythema/edema    Additional Data:     Labs:    Results from last 7 days   Lab Units 25  0542   WBC Thousand/uL 10.46*   HEMOGLOBIN g/dL 7.2*   HEMATOCRIT % 23.0*   PLATELETS Thousands/uL 264     Results from last 7 days   Lab Units 25  0542 25  0545 25  0436 25  1512   POTASSIUM mmol/L 4.5 4.0   < >  --    CHLORIDE mmol/L 104 103   < >  --    CO2 mmol/L 27 25   < >  --    CO2, I-STAT mmol/L  --   --   --  22   BUN mg/dL 11 14   < >  --    CREATININE mg/dL 0.90 0.87   < >  --    CALCIUM mg/dL 8.3* 8.1*   < >  --    ALK PHOS U/L  --  51   < >  --    ALT U/L  --  4*   < >  --    AST U/L  --  12*   < >  --    GLUCOSE, ISTAT mg/dl  --   --   --  262*    < > = values in this interval not displayed.           * I Have Reviewed All Lab Data Listed Above.    Recent Cultures (last 7 days):     Results from last 7 days   Lab Units 25  1337   GRAM STAIN RESULT  Rare Gram positive cocci in pairs*  No polys seen*   WOUND CULTURE  Culture too young- will reincubate     Results from last 7 days   Lab Units 25  1337   ANAEROBIC CULTURE  Culture results to follow.       Imaging: I have personally reviewed pertinent films in PACS  EKG, Pathology, and Other Studies: I have  "personally reviewed pertinent reports.    ** Please Note: Portions of the record may have been created with voice recognition software. Occasional wrong word or \"sound a like\" substitutions may have occurred due to the inherent limitations of voice recognition software. Read the chart carefully and recognize, using context, where substitutions have occurred. **    "

## 2025-05-17 NOTE — TREATMENT PLAN
Podiatry Treatment Plan    Was alerted by patient's nursing staff that he again bled through his wound VAC dressings.  After reinforcement, still bled through.  Reported to bedside to address strikethrough.    Reviewed H&H, hemoglobin stable at 7.7, improved from morning lab hemoglobin of 7.2. Reviewed vitals, stable with blood pressure 109/65 and pulse 90.    Decision was made to discontinue wound VAC therapy at this time.  All dressings were removed.  Surgical wound was inspected, no active bleeders were appreciated.  Slight ooze noted to most proximal aspect of incision, electrocautery was utilized to control the ooze.    Surgical site was packed with Surgifoam, quarter inch packing strip, bulky bolster dressing of gauze, ABD, Webril, and Ace wrap.  Discussed with nursing staff in room, may reinforce again if needed.  Wound care orders placed. Please contact podiatry if strikethrough is noted through dressings and reinforcement.    Will reassess surgical site on Monday, 5/19.  May reapply VAC at this time versus consider repeat washout.

## 2025-05-17 NOTE — ASSESSMENT & PLAN NOTE
Lab Results   Component Value Date    HGBA1C 9.3 (H) 04/09/2025     Recent Labs     05/16/25  1604 05/16/25  2112 05/17/25  0728 05/17/25  1155   POCGLU 258* 223* 98 142*     Prior to admission glipizide linagliptin and metformin  A1c 9.3.  Endocrinology consulted by vascular surgery  Continue metformin.  Restarted glipizide

## 2025-05-17 NOTE — PROGRESS NOTES
The metronidazole has / have been converted to Oral per [facility] IV-to-PO Auto-Conversion Protocol for Adults as approved by the Pharmacy and Therapeutics Committee. The patient met all eligible criteria:  1) Age = 18 years old   2) Received at least one dose of the IV form   3) Receiving at least one other scheduled oral/enteral medication   4) Tolerating an oral/enteral diet   and did not have any exclusions:   1) Critical care patient   2) Active GI bleed (IF assessing H2RAs or PPIs)   3) Continuous tube feeding (IF assessing cipro, doxycycline, levofloxacin, minocycline, rifampin, or voriconazole)   4) Receiving PO vancomycin (IF assessing metronidazole)   5) Persistent nausea and/or vomiting   6) Ileus or gastrointestinal obstruction   7) Elif/nasogastric tube set for continuous suction   8) Specific order not to automatically convert to PO (in the order's comments or if discussed in the most recent Infectious Disease or primary team's progress notes).

## 2025-05-17 NOTE — ASSESSMENT & PLAN NOTE
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  Podiatry to decide on further washout versus continuing VAC  Follow-up on intraoperative cultures

## 2025-05-17 NOTE — PROGRESS NOTES
Progress Note - Vascular Surgery   Name: Gold Van 79 y.o. 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 Pt admitted and started on ABX. Pt seen by podiatry w/ plans for R partial 5th ray resection on Fri (5/16). Pt was transferred to [facility] on 5/11/25 for vascular surgical intervention.     Vascular surgery consulted for worsening R 5th met wound in the setting of PAD. CTA abd shows L CFA/SFA stenosis w/ focal occlusions and AT/peroneal occlusions w/ reconstitution. LEAD shows R PTA occlusion and R RIC: 0.42/15/16.    Pt is S/P R CFA/SFA endarterectomy w/ bovine pericardial patch, DCB and stenting of SFA, and angio of AT on 5/13.     Diagnostics:  -5/6/25 CTA abd w/ runoff: B/L EDY/EIA/IIA patent w/ atherosclerosis. Right: Focal high-grade stenosis of distal R CFA and diffuse atherosclerotic disease of R SFA resulting in moderate to severe stenoses with focal near complete occlusions at the proximal and distal segments. Short segment occlusions of proximal R AT and peroneal arteries with reconstitution. R PTA is occluded. Left: Occluded L SFA with reconstitution. L YUNG and PTA occluded. One-vessel runoff LLE through peroneal artery.  -4/18/25 LEAD: Right: >75% prox SFA and 50-75% dSFA stenosis. Distal PTA occlusion. R RIC: 0.42/15/16. Left: PTA and YUNG occlusions. L RIC: 1.06/85/45.   -4/17/25 MRI R foot: Possible early OM in R 5th met    Plan:  -R 5th met wound (+) OM in the setting of PAD  -S/P R CFA/SFA endarterectomy w/ bovine pericardial patch, DCB and stenting of SFA, and angio of AT on 5/13 (POD  #4)  -Continue plavix (new SFA stents) and lipitor for medical management of PAD   -Podiatry following. S/p R partial 5th ray resection 5/16  -Resume eliquis when deemed appropriate from podiatry standpoint.   -Continue medical management per primary team  -Pt is stable for discharge from a vascular surgery standpoint  -Will continue to follow peripherally while pt remains hospitalized.   -Prevena vac not holding appropriate suction 5/17; vac was taken down and incision was inspected. R groin incision c/d/I with staples in place. No signs of infection. Incision was cleansed and painted with betadine, redressed with ABD pad.   -Daily LWC to right groin per nursing.   -Plan follow up outpt in vascular surgery office; appt scheduled for 6/5/25  -D/w Dr. Cee.     24 Hour Events : None   Subjective : Patient resting comfortably in bed in no acute distress.  Vascular notified by patient's nurse that right groin Prevena VAC not holding appropriate suction.  Right groin was inspected and Prevena VAC not holding appropriate suction.  VAC was taken down and incision was inspected and cleansed with normal saline solution and painted with Betadine, redressed with ABD pad.  No overt signs of infection.     Objective :  Temp:  [98.5 °F (36.9 °C)-101.5 °F (38.6 °C)] 98.5 °F (36.9 °C)  HR:  [68-91] 83  BP: (101-122)/(57-68) 113/61  Resp:  [16-20] 16  SpO2:  [91 %-98 %] 95 %  O2 Device: Nasal cannula     I/O         05/15 0701  05/16 0700 05/16 0701  05/17 0700 05/17 0701  05/18 0700    P.O. 780 600 120    I.V. (mL/kg)  902.5 (11.5)     IV Piggyback       Total Intake(mL/kg) 780 (10.4) 1502.5 (19.2) 120 (1.5)    Urine (mL/kg/hr) 1300 (0.7) 800 (0.4)     Drains  0     Total Output 1300 800     Net -520 +702.5 +120                   Physical Exam  Vitals and nursing note reviewed.   Constitutional:       Appearance: Normal appearance.   HENT:      Head: Normocephalic and atraumatic.     Cardiovascular:      Rate and Rhythm: Normal  "rate and regular rhythm.      Heart sounds: Normal heart sounds.   Pulmonary:      Effort: Pulmonary effort is normal.      Breath sounds: Normal breath sounds.     Skin:     General: Skin is warm and dry.      Comments: Right groin prevena vac in place without appropriate suction; vac taken down and incision inspected.   Right groin incision c/d/I with staples. No overt signs of infection.      Neurological:      General: No focal deficit present.      Mental Status: He is alert and oriented to person, place, and time.           Lab Results: I have reviewed the following results:  CBC with diff:   Lab Results   Component Value Date    WBC 10.46 (H) 05/17/2025    HGB 7.2 (L) 05/17/2025    HCT 23.0 (L) 05/17/2025    MCV 94 05/17/2025     05/17/2025    RBC 2.46 (L) 05/17/2025    MCH 29.3 05/17/2025    MCHC 31.3 (L) 05/17/2025    RDW 15.2 (H) 05/17/2025    MPV 10.0 05/17/2025    NRBC 0 05/07/2025   ,   BMP/CMP:  Lab Results   Component Value Date    SODIUM 136 05/17/2025    K 4.5 05/17/2025    K 4.5 04/14/2015     05/17/2025     04/14/2015    CO2 27 05/17/2025    CO2 22 05/13/2025    ANIONGAP 8 04/14/2015    BUN 11 05/17/2025    BUN 10 04/14/2015    CREATININE 0.90 05/17/2025    CREATININE 0.86 04/14/2015    GLUCOSE 262 (H) 05/13/2025    GLUCOSE 179 (H) 04/14/2015    CALCIUM 8.3 (L) 05/17/2025    CALCIUM 8.9 04/14/2015    AST 12 (L) 05/16/2025    AST 12 08/12/2014    ALT 4 (L) 05/16/2025    ALT 19 08/12/2014    ALKPHOS 51 05/16/2025    ALKPHOS 101 08/12/2014    PROT 6.8 08/12/2014    BILITOT 0.6 08/12/2014    EGFR 80 05/17/2025   ,   Lipid Panel: No results found for: \"CHOL\",   Coags:   Lab Results   Component Value Date    PTT 53 (H) 05/15/2025    INR 1.14 05/07/2025   ,   Blood Culture:   Lab Results   Component Value Date    BLOODCX No Growth After 5 Days. 05/07/2025    BLOODCX No Growth After 5 Days. 05/07/2025   ,   Urinalysis:   Lab Results   Component Value Date    COLORU Yellow 05/07/2025    " "COLORU Yellow 08/12/2014    CLARITYU Clear 05/07/2025    CLARITYU Clear 08/12/2014    SPECGRAV 1.010 05/07/2025    SPECGRAV 1.025 08/12/2014    PHUR 6.0 05/07/2025    PHUR 6.0 06/14/2016    PHUR 6.0 08/12/2014    LEUKOCYTESUR Negative 05/07/2025    LEUKOCYTESUR Negative 08/12/2014    NITRITE Negative 05/07/2025    NITRITE Negative 08/12/2014    PROTEINUA Negative 08/12/2014    GLUCOSEU Negative 05/07/2025    GLUCOSEU 100 (1/10%) (A) 08/12/2014    KETONESU Negative 05/07/2025    KETONESU Negative 08/12/2014    BILIRUBINUR Negative 05/07/2025    BILIRUBINUR Negative 08/12/2014    BLOODU 1+ (A) 05/07/2025    BLOODU Trace (A) 08/12/2014   ,   Urine Culture:   Lab Results   Component Value Date    URINECX No Growth <1000 cfu/mL 10/21/2022   ,   Wound Culure: No results found for: \"WOUNDCULT\"    Imaging Results Review: No pertinent imaging studies reviewed.  Other Study Results Review: No additional pertinent studies reviewed.    VTE Prophylaxis: VTE covered by:  apixaban, Oral, 5 mg at 05/17/25 0839     "

## 2025-05-17 NOTE — PLAN OF CARE
Problem: PAIN - ADULT  Goal: Verbalizes/displays adequate comfort level or baseline comfort level  Description: Interventions:- Encourage patient to monitor pain and request assistance- Assess pain using appropriate pain scale- Administer analgesics based on type and severity of pain and evaluate response- Implement non-pharmacological measures as appropriate and evaluate response- Consider cultural and social influences on pain and pain management- Notify physician/advanced practitioner if interventions unsuccessful or patient reports new pain  Outcome: Progressing     Problem: DISCHARGE PLANNING  Goal: Discharge to home or other facility with appropriate resources  Description: INTERVENTIONS:- Identify barriers to discharge w/patient and caregiver- Arrange for needed discharge resources and transportation as appropriate- Identify discharge learning needs (meds, wound care, etc.)- Arrange for interpretive services to assist at discharge as needed- Refer to Case Management Department for coordinating discharge planning if the patient needs post-hospital services based on physician/advanced practitioner order or complex needs related to functional status, cognitive ability, or social support system  Outcome: Progressing     Problem: INFECTION - ADULT  Goal: Absence or prevention of progression during hospitalization  Description: INTERVENTIONS:  - Assess and monitor for signs and symptoms of infection  - Monitor lab/diagnostic results  - Monitor all insertion sites, i.e. indwelling lines, tubes, and drains  - Monitor endotracheal if appropriate and nasal secretions for changes in amount and color  - [place] appropriate cooling/warming therapies per order  - Administer medications as ordered  - Instruct and encourage patient and family to use good hand hygiene technique  - Identify and instruct in appropriate isolation precautions for identified infection/condition  Outcome: Progressing  Goal: Absence of  fever/infection during neutropenic period  Description: INTERVENTIONS:  - Monitor WBC    Outcome: Progressing     Problem: SAFETY ADULT  Goal: Patient will remain free of falls  Description: INTERVENTIONS:  - Educate patient/family on patient safety including physical limitations  - Instruct patient to call for assistance with activity   - Consult OT/PT to assist with strengthening/mobility   - Keep Call bell within reach  - Keep bed low and locked with side rails adjusted as appropriate  - Keep care items and personal belongings within reach  - Initiate and maintain comfort rounds  - Make Fall Risk Sign visible to staff  - Offer Toileting every 2 Hours, in advance of need  - Initiate/Maintain bed alarm  - Obtain necessary fall risk management equipment:   - Apply yellow socks and bracelet for high fall risk patients  - Consider moving patient to room near nurses station  Outcome: Progressing  Goal: Maintain or return to baseline ADL function  Description: INTERVENTIONS:  -  Assess patient's ability to carry out ADLs; assess patient's baseline for ADL function and identify physical deficits which impact ability to perform ADLs (bathing, care of mouth/teeth, toileting, grooming, dressing, etc.)  - Assess/evaluate cause of self-care deficits   - Assess range of motion  - Assess patient's mobility; develop plan if impaired  - Assess patient's need for assistive devices and provide as appropriate  - Encourage maximum independence but intervene and supervise when necessary  - Involve family in performance of ADLs  - Assess for home care needs following discharge   - Consider OT consult to assist with ADL evaluation and planning for discharge  - Provide patient education as appropriate  Outcome: Progressing  Goal: Maintains/Returns to pre admission functional level  Description: INTERVENTIONS:  - Perform AM-PAC 6 Click Basic Mobility/ Daily Activity assessment daily.  - Set and communicate daily mobility goal to care team  and patient/family/caregiver.   - Collaborate with rehabilitation services on mobility goals if consulted  - Perform Range of Motion 4 times a day.  - Reposition patient every 3 hours.  - Dangle patient 3 times a day  - Stand patient 3 times a day  - Ambulate patient 3 times a day  - Out of bed to chair 3 times a day   - Out of bed for meals 3 times a day  - Out of bed for toileting  - Record patient progress and toleration of activity level   Outcome: Progressing     Problem: Knowledge Deficit  Goal: Patient/family/caregiver demonstrates understanding of disease process, treatment plan, medications, and discharge instructions  Description: Complete learning assessment and assess knowledge base.  Interventions:  - Provide teaching at level of understanding  - Provide teaching via preferred learning methods  Outcome: Progressing     Problem: SKIN/TISSUE INTEGRITY - ADULT  Goal: Skin Integrity remains intact(Skin Breakdown Prevention)  Description: Assess:-Perform Sae assessment -Clean and moisturize skin -Inspect skin when repositioning, toileting, and assisting with ADLS-Assess under medical devices -Assess extremities for adequate circulation and sensation Bed Management:-Have minimal linens on bed & keep smooth, unwrinkled-Change linens as needed when moist or perspiring-Avoid sitting or lying in one position for more than 2 hours while in bed-Keep HOB at 30 degrees Toileting:-Offer bedside commode-Assess for incontinence -Use incontinent care products after each incontinent episode Activity:-Mobilize patient 3 times a day-Encourage activity and walks on unit-Encourage or provide ROM exercises -Turn and reposition patient every 2 Hours-Use appropriate equipment to lift or move patient in bed-Instruct/ Assist with weight shifting  when out of bed in chair-Consider limitation of chair time 2 hour intervalsSkin Care:-Avoid use of baby powder, tape, friction and shearing, hot water or constrictive clothing-Relieve  pressure over bony prominences Do not massage red bony areasNext Steps:-Teach patient strategies to minimize risks Consider consults to  interdisciplinary teams   Outcome: Progressing  Goal: Incision(s), wounds(s) or drain site(s) healing without S/S of infection  Description: INTERVENTIONS- Assess and document dressing, incision, wound bed, drain sites and surrounding tissue- Provide patient and family education- Perform skin care/dressing changes every shift  Outcome: Progressing  Goal: Pressure injury heals and does not worsen  Description: Interventions:- Implement low air loss mattress or specialty surface (Criteria met)- Apply silicone foam dressing- Instruct/assist with weight shifting every 120 minutes when in chair - Limit chair time to 2 hour intervals- Use special pressure reducing interventions such as turning when in chair - Apply fecal or urinary incontinence containment device - Perform passive or active ROM every 2 hours- Turn and reposition patient & offload bony prominences every 2 hours - Utilize friction reducing device or surface for transfers - Consider consults to  interdisciplinary teams such as wound- Use incontinent care products after each incontinent episode such as wipes- Consider nutrition services referral as needed  Outcome: Progressing     Problem: METABOLIC, FLUID AND ELECTROLYTES - ADULT  Goal: Electrolytes maintained within normal limits  Description: INTERVENTIONS:  - Monitor labs and assess patient for signs and symptoms of electrolyte imbalances  - Administer electrolyte replacement as ordered  - Monitor response to electrolyte replacements, including repeat lab results as appropriate  - Instruct patient on fluid and nutrition as appropriate  Outcome: Progressing     Problem: HEMATOLOGIC - ADULT  Goal: Maintains hematologic stability  Description: INTERVENTIONS  - Assess for signs and symptoms of bleeding or hemorrhage  - Monitor labs  - Administer supportive blood  products/factors as ordered and appropriate  Outcome: Progressing     Problem: Nutrition/Hydration-ADULT  Goal: Nutrient/Hydration intake appropriate for improving, restoring or maintaining nutritional needs  Description: Monitor and assess patient's nutrition/hydration status for malnutrition. Collaborate with interdisciplinary team and initiate plan and interventions as ordered.  Monitor patient's weight and dietary intake as ordered or per policy. Utilize nutrition screening tool and intervene as necessary. Determine patient's food preferences and provide high-protein, high-caloric foods as appropriate. INTERVENTIONS:- Monitor oral intake, urinary output, labs, and treatment plans- Assess nutrition and hydration status and recommend course of action- Evaluate amount of meals eaten- Assist patient with eating if necessary - Allow adequate time for meals- Recommend/ encourage appropriate diets, oral nutritional supplements, and vitamin/mineral supplements- Order, calculate, and assess calorie counts as needed- Recommend, monitor, and adjust tube feedings and TPN/PPN based on assessed needs- Assess need for intravenous fluids- Provide specific nutrition/hydration education as appropriate- Include patient/family/caregiver in decisions related to nutrition  Outcome: Progressing     Problem: Prexisting or High Potential for Compromised Skin Integrity  Goal: Skin integrity is maintained or improved  Description: INTERVENTIONS:  - Identify patients at risk for skin breakdown  - Assess and monitor skin integrity  - Assess and monitor nutrition and hydration status  - Monitor labs   - Assess for incontinence   - Turn and reposition patient  - Assist with mobility/ambulation  - Relieve pressure over bony prominences  - Avoid friction and shearing  - Provide appropriate hygiene as needed including keeping skin clean and dry  - Evaluate need for skin moisturizer/barrier cream  - Collaborate with interdisciplinary team   -  Patient/family teaching  - Consider wound care consult   Outcome: Progressing

## 2025-05-17 NOTE — PLAN OF CARE
Problem: PAIN - ADULT  Goal: Verbalizes/displays adequate comfort level or baseline comfort level  Description: Interventions:- Encourage patient to monitor pain and request assistance- Assess pain using appropriate pain scale- Administer analgesics based on type and severity of pain and evaluate response- Implement non-pharmacological measures as appropriate and evaluate response- Consider cultural and social influences on pain and pain management- Notify physician/advanced practitioner if interventions unsuccessful or patient reports new pain  Outcome: Progressing     Problem: DISCHARGE PLANNING  Goal: Discharge to home or other facility with appropriate resources  Description: INTERVENTIONS:- Identify barriers to discharge w/patient and caregiver- Arrange for needed discharge resources and transportation as appropriate- Identify discharge learning needs (meds, wound care, etc.)- Arrange for interpretive services to assist at discharge as needed- Refer to Case Management Department for coordinating discharge planning if the patient needs post-hospital services based on physician/advanced practitioner order or complex needs related to functional status, cognitive ability, or social support system  Outcome: Progressing     Problem: INFECTION - ADULT  Goal: Absence or prevention of progression during hospitalization  Description: INTERVENTIONS:  - Assess and monitor for signs and symptoms of infection  - Monitor lab/diagnostic results  - Monitor all insertion sites, i.e. indwelling lines, tubes, and drains  - Monitor endotracheal if appropriate and nasal secretions for changes in amount and color  - [place] appropriate cooling/warming therapies per order  - Administer medications as ordered  - Instruct and encourage patient and family to use good hand hygiene technique  - Identify and instruct in appropriate isolation precautions for identified infection/condition  Outcome: Progressing  Goal: Absence of  fever/infection during neutropenic period  Description: INTERVENTIONS:  - Monitor WBC    Outcome: Progressing     Problem: SAFETY ADULT  Goal: Patient will remain free of falls  Description: INTERVENTIONS:  - Educate patient/family on patient safety including physical limitations  - Instruct patient to call for assistance with activity   - Consult OT/PT to assist with strengthening/mobility   - Keep Call bell within reach  - Keep bed low and locked with side rails adjusted as appropriate  - Keep care items and personal belongings within reach  - Initiate and maintain comfort rounds  - Make Fall Risk Sign visible to staff  - Offer Toileting every 2 Hours, in advance of need  - Initiate/Maintain bed alarm  - Obtain necessary fall risk management equipment:   - Apply yellow socks and bracelet for high fall risk patients  - Consider moving patient to room near nurses station  Outcome: Progressing  Goal: Maintain or return to baseline ADL function  Description: INTERVENTIONS:  -  Assess patient's ability to carry out ADLs; assess patient's baseline for ADL function and identify physical deficits which impact ability to perform ADLs (bathing, care of mouth/teeth, toileting, grooming, dressing, etc.)  - Assess/evaluate cause of self-care deficits   - Assess range of motion  - Assess patient's mobility; develop plan if impaired  - Assess patient's need for assistive devices and provide as appropriate  - Encourage maximum independence but intervene and supervise when necessary  - Involve family in performance of ADLs  - Assess for home care needs following discharge   - Consider OT consult to assist with ADL evaluation and planning for discharge  - Provide patient education as appropriate  Outcome: Progressing  Goal: Maintains/Returns to pre admission functional level  Description: INTERVENTIONS:  - Perform AM-PAC 6 Click Basic Mobility/ Daily Activity assessment daily.  - Set and communicate daily mobility goal to care team  and patient/family/caregiver.   - Collaborate with rehabilitation services on mobility goals if consulted  - Perform Range of Motion 4 times a day.  - Reposition patient every 3 hours.  - Dangle patient 3 times a day  - Stand patient 3 times a day  - Ambulate patient 3 times a day  - Out of bed to chair 3 times a day   - Out of bed for meals 3 times a day  - Out of bed for toileting  - Record patient progress and toleration of activity level   Outcome: Progressing     Problem: Knowledge Deficit  Goal: Patient/family/caregiver demonstrates understanding of disease process, treatment plan, medications, and discharge instructions  Description: Complete learning assessment and assess knowledge base.  Interventions:  - Provide teaching at level of understanding  - Provide teaching via preferred learning methods  Outcome: Progressing     Problem: SKIN/TISSUE INTEGRITY - ADULT  Goal: Skin Integrity remains intact(Skin Breakdown Prevention)  Description: Assess:-Perform Sae assessment -Clean and moisturize skin -Inspect skin when repositioning, toileting, and assisting with ADLS-Assess under medical devices -Assess extremities for adequate circulation and sensation Bed Management:-Have minimal linens on bed & keep smooth, unwrinkled-Change linens as needed when moist or perspiring-Avoid sitting or lying in one position for more than 2 hours while in bed-Keep HOB at 30 degrees Toileting:-Offer bedside commode-Assess for incontinence -Use incontinent care products after each incontinent episode Activity:-Mobilize patient 3 times a day-Encourage activity and walks on unit-Encourage or provide ROM exercises -Turn and reposition patient every 2 Hours-Use appropriate equipment to lift or move patient in bed-Instruct/ Assist with weight shifting  when out of bed in chair-Consider limitation of chair time 2 hour intervalsSkin Care:-Avoid use of baby powder, tape, friction and shearing, hot water or constrictive clothing-Relieve  pressure over bony prominences Do not massage red bony areasNext Steps:-Teach patient strategies to minimize risks Consider consults to  interdisciplinary teams   Outcome: Progressing  Goal: Incision(s), wounds(s) or drain site(s) healing without S/S of infection  Description: INTERVENTIONS- Assess and document dressing, incision, wound bed, drain sites and surrounding tissue- Provide patient and family education- Perform skin care/dressing changes every shift  Outcome: Progressing  Goal: Pressure injury heals and does not worsen  Description: Interventions:- Implement low air loss mattress or specialty surface (Criteria met)- Apply silicone foam dressing- Instruct/assist with weight shifting every 120 minutes when in chair - Limit chair time to 2 hour intervals- Use special pressure reducing interventions such as turning when in chair - Apply fecal or urinary incontinence containment device - Perform passive or active ROM every 2 hours- Turn and reposition patient & offload bony prominences every 2 hours - Utilize friction reducing device or surface for transfers - Consider consults to  interdisciplinary teams such as wound- Use incontinent care products after each incontinent episode such as wipes- Consider nutrition services referral as needed  Outcome: Progressing     Problem: METABOLIC, FLUID AND ELECTROLYTES - ADULT  Goal: Electrolytes maintained within normal limits  Description: INTERVENTIONS:  - Monitor labs and assess patient for signs and symptoms of electrolyte imbalances  - Administer electrolyte replacement as ordered  - Monitor response to electrolyte replacements, including repeat lab results as appropriate  - Instruct patient on fluid and nutrition as appropriate  Outcome: Progressing     Problem: HEMATOLOGIC - ADULT  Goal: Maintains hematologic stability  Description: INTERVENTIONS  - Assess for signs and symptoms of bleeding or hemorrhage  - Monitor labs  - Administer supportive blood  products/factors as ordered and appropriate  Outcome: Progressing     Problem: Nutrition/Hydration-ADULT  Goal: Nutrient/Hydration intake appropriate for improving, restoring or maintaining nutritional needs  Description: Monitor and assess patient's nutrition/hydration status for malnutrition. Collaborate with interdisciplinary team and initiate plan and interventions as ordered.  Monitor patient's weight and dietary intake as ordered or per policy. Utilize nutrition screening tool and intervene as necessary. Determine patient's food preferences and provide high-protein, high-caloric foods as appropriate. INTERVENTIONS:- Monitor oral intake, urinary output, labs, and treatment plans- Assess nutrition and hydration status and recommend course of action- Evaluate amount of meals eaten- Assist patient with eating if necessary - Allow adequate time for meals- Recommend/ encourage appropriate diets, oral nutritional supplements, and vitamin/mineral supplements- Order, calculate, and assess calorie counts as needed- Recommend, monitor, and adjust tube feedings and TPN/PPN based on assessed needs- Assess need for intravenous fluids- Provide specific nutrition/hydration education as appropriate- Include patient/family/caregiver in decisions related to nutrition  Outcome: Progressing     Problem: Prexisting or High Potential for Compromised Skin Integrity  Goal: Skin integrity is maintained or improved  Description: INTERVENTIONS:  - Identify patients at risk for skin breakdown  - Assess and monitor skin integrity  - Assess and monitor nutrition and hydration status  - Monitor labs   - Assess for incontinence   - Turn and reposition patient  - Assist with mobility/ambulation  - Relieve pressure over bony prominences  - Avoid friction and shearing  - Provide appropriate hygiene as needed including keeping skin clean and dry  - Evaluate need for skin moisturizer/barrier cream  - Collaborate with interdisciplinary team   -  Patient/family teaching  - Consider wound care consult   Outcome: Progressing

## 2025-05-17 NOTE — ASSESSMENT & PLAN NOTE
80 yo male ex-smoker w/ a hx of DM II (A1c 9.3), HTN, HLD, asthma, CVA (9/2024), PAF (eliquis), cardiomyopathy and PAD presented to [facility] on 5/7/25 w/ R foot pain x1 week and non-healing R 5th met wound x4-5 weeks. Pt admitted and started on ABX. Pt seen by podiatry w/ plans for R partial 5th ray resection on Fri (5/16). Pt was transferred to [facility] on 5/11/25 for vascular surgical intervention.     Vascular surgery consulted for worsening R 5th met wound in the setting of PAD. CTA abd shows L CFA/SFA stenosis w/ focal occlusions and AT/peroneal occlusions w/ reconstitution. LEAD shows R PTA occlusion and R RIC: 0.42/15/16.    Pt is S/P R CFA/SFA endarterectomy w/ bovine pericardial patch, DCB and stenting of SFA, and angio of AT on 5/13.     Diagnostics:  -5/6/25 CTA abd w/ runoff: B/L EDY/EIA/IIA patent w/ atherosclerosis. Right: Focal high-grade stenosis of distal R CFA and diffuse atherosclerotic disease of R SFA resulting in moderate to severe stenoses with focal near complete occlusions at the proximal and distal segments. Short segment occlusions of proximal R AT and peroneal arteries with reconstitution. R PTA is occluded. Left: Occluded L SFA with reconstitution. L YUNG and PTA occluded. One-vessel runoff LLE through peroneal artery.  -4/18/25 LEAD: Right: >75% prox SFA and 50-75% dSFA stenosis. Distal PTA occlusion. R RIC: 0.42/15/16. Left: PTA and YUNG occlusions. L RIC: 1.06/85/45.   -4/17/25 MRI R foot: Possible early OM in R 5th met    Plan:  -R 5th met wound (+) OM in the setting of PAD  -S/P R CFA/SFA endarterectomy w/ bovine pericardial patch, DCB and stenting of SFA, and angio of AT on 5/13 (POD #4)  -Continue plavix (new SFA stents) and lipitor for medical management of PAD   -Podiatry following. S/p R partial 5th ray resection 5/16  -Resume eliquis when deemed appropriate from podiatry standpoint.   -Continue medical management per primary team  -Pt is stable for discharge from a vascular surgery  standpoint  -Will continue to follow peripherally while pt remains hospitalized.   -Prevena vac not holding appropriate suction 5/17; vac was taken down and incision was inspected. R groin incision c/d/I with staples in place. No signs of infection. Incision was cleansed and painted with betadine, redressed with ABD pad.   -Daily LWC to right groin per nursing.   -Plan follow up outpt in vascular surgery office; appt scheduled for 6/5/25  -D/w Dr. Cee.

## 2025-05-17 NOTE — PROGRESS NOTES
Progress Note - Hospitalist   Name: Gold Van 79 y.o. 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  Podiatry to decide on further washout versus continuing VAC  Follow-up on intraoperative cultures  Severe peripheral arterial disease ([facility])  Transferred here for vascular surgery intervention  Underwent right CFA SFA endarterectomy/stenting 5/13/2025  Aspirin discontinued.  Started on clopidogrel for stents  Type 2 diabetes mellitus with complication, without long-term current use of insulin ([facility])  Lab Results   Component Value Date    HGBA1C 9.3 (H) 04/09/2025     Recent Labs     05/16/25  1604 05/16/25  2112 05/17/25  0728 05/17/25  1155   POCGLU 258* 223* 98 142*     Prior to admission glipizide linagliptin and metformin  A1c 9.3.  Endocrinology consulted by vascular surgery  Continue metformin.  Restarted glipizide  PAF (paroxysmal atrial fibrillation) ([facility])  Continue atenolol.  Anticoagulation: Apixaban was on hold with heparin infusion.  Apixaban has been restarted.  Essential hypertension  Continue amlodipine atenolol and lisinopril  Mild intermittent asthma without complication  Prior to admission on [facility].  No exacerbation  Pruritus  Chronic pruritus follows with dermatology as an outpatient on prednisone 5 mg daily  Cardiomyopathy ([facility])  Last known LVEF 50% echocardiogram 2024  Currently compensated.  DC IV fluids    VTE Pharmacologic Prophylaxis: VTE Score: 5 High Risk (Score >/= 5) - Pharmacological DVT  Prophylaxis Ordered: apixaban (Eliquis). Sequential Compression Devices Ordered.    Mobility:   Basic Mobility Inpatient Raw Score: 17  JH-HLM Goal: 5: Stand one or more mins  JH-HLM Achieved: 5: Stand (1 or more minutes)  JH-HLM Goal achieved. Continue to encourage appropriate mobility.    Patient Centered Rounds: I have performed bedside rounds with nursing staff today.  Discussions with Specialists or Other Care Team Provider: Case management    Education and Discussions with Family / Patient: Updated  (son) at bedside.    Current Length of Stay: 6 day(s)  Current Patient Status: Inpatient   Certification Statement: The patient will continue to require additional inpatient hospital stay due to IV antibiotics, awaiting intraoperative cultures, podiatry decision on further OR  Discharge Plan: Anticipate discharge in >72 hrs to home with home services.    Code Status: Level 1 - Full Code    Subjective   Patient seen and examined.  No complaints.  Pain controlled.    Objective   Vitals:   Temp (24hrs), Av.3 °F (37.4 °C), Min:98.2 °F (36.8 °C), Max:101.5 °F (38.6 °C)    Temp:  [98.2 °F (36.8 °C)-101.5 °F (38.6 °C)] 98.2 °F (36.8 °C)  HR:  [79-91] 84  Resp:  [16-20] 16  BP: (110-122)/(61-68) 122/62  SpO2:  [91 %-98 %] 92 %  Body mass index is 23.38 kg/m².     Input and Output Summary (last 24 hours):     Intake/Output Summary (Last 24 hours) at 2025 1451  Last data filed at 2025 1254  Gross per 24 hour   Intake 2517.5 ml   Output 800 ml   Net 1717.5 ml       Physical Exam  Vitals reviewed.   Constitutional:       General: He is not in acute distress.  HENT:      Head: Atraumatic.     Eyes:      General: No scleral icterus.     Extraocular Movements: Extraocular movements intact.       Cardiovascular:      Rate and Rhythm: Regular rhythm.      Heart sounds:      No gallop.   Pulmonary:      Effort: Pulmonary effort is normal. No respiratory distress.      Breath sounds: No stridor.    Abdominal:      General: Bowel sounds are normal.      Palpations: Abdomen is soft.      Tenderness: There is no abdominal tenderness.     Musculoskeletal:         General: Deformity (VAC right foot) present. No tenderness.     Skin:     General: Skin is warm.      Coloration: Skin is not jaundiced.     Neurological:      General: No focal deficit present.      Mental Status: He is alert.      Motor: No weakness.     Psychiatric:         Mood and Affect: Mood normal.       Lines/Drains:  Invasive Devices       Peripheral Intravenous Line  Duration             Peripheral IV 05/17/25 Right Antecubital <1 day                            Lab Results: I have reviewed the following results:   Results from last 7 days   Lab Units 05/17/25  0542 05/16/25  1642 05/16/25  0545 05/15/25  0433   WBC Thousand/uL 10.46*  --  10.50* 10.43*   HEMOGLOBIN g/dL 7.2* 7.7* 7.4* 7.4*   PLATELETS Thousands/uL 264  --  236 197   MCV fL 94  --  91 90     Results from last 7 days   Lab Units 05/17/25  0542 05/16/25  0545 05/15/25  0445 05/14/25  0436 05/13/25  1512 05/13/25  0455   SODIUM mmol/L 136 134* 133* 134*  --  133*   POTASSIUM mmol/L 4.5 4.0 4.2 4.6  --  4.2   CHLORIDE mmol/L 104 103 102 104  --  101   CO2 mmol/L 27 25 25 23  --  26   CO2, I-STAT   --   --   --   --    < >  --    ANION GAP mmol/L 5 6 6 7  --  6   BUN mg/dL 11 14 18 14  --  13   CREATININE mg/dL 0.90 0.87 1.13 1.13  --  0.93   CALCIUM mg/dL 8.3* 8.1* 7.9* 8.1*  --  8.7   ALBUMIN g/dL  --  2.6*  --  2.9*  --  3.1*   TOTAL BILIRUBIN mg/dL  --  0.30  --  0.38  --  0.33   ALK PHOS U/L  --  51  --  52  --  62   ALT U/L  --  4*  --  15  --  22   AST U/L  --  12*  --  31  --  46*   EGFR ml/min/1.73sq m 80 82 61 61  --  77   GLUCOSE RANDOM mg/dL 87 167* 228* 242*  --  206*    < > = values in this interval not displayed.     Results from last 7 days   Lab Units 05/14/25  0436   MAGNESIUM mg/dL 2.1   PHOSPHORUS mg/dL 4.0                      Results from last 7 days   Lab  Units 05/17/25  1155 05/17/25  0728 05/16/25  2112 05/16/25  1604 05/16/25  1413 05/16/25  1124 05/16/25  0818 05/15/25  2137 05/15/25  1556 05/15/25  1140 05/15/25  0810 05/14/25  2131   POC GLUCOSE mg/dl 142* 98 223* 258* 199* 185* 229* 276* 445* 332* 261* 285*               Recent Cultures (last 7 days):   Results from last 7 days   Lab Units 05/16/25  1337   GRAM STAIN RESULT  Rare Gram positive cocci in pairs*  No polys seen*   WOUND CULTURE  Culture too young- will reincubate       Imaging:  Reviewed radiology reports from this admission including:  XR foot 3+ vw right  Result Date: 5/16/2025  Impression: Postop right foot. No acute osseous abnormality. Workstation performed: [id number]       Last 24 Hours Medication List:     Current Facility-Administered Medications:     acetaminophen (TYLENOL) tablet 975 mg, Q8H SANDRA    albuterol (PROVENTIL HFA,VENTOLIN HFA) inhaler 1 puff, Q4H PRN    amLODIPine (NORVASC) tablet 5 mg, BID    apixaban (ELIQUIS) tablet 5 mg, BID    atenolol (TENORMIN) tablet 25 mg, Daily    atorvastatin (LIPITOR) tablet 40 mg, QPM    Budeson-Glycopyrrol-Formoterol 160-9-4.8 MCG/ACT AERO 2 puff, BID    ceFAZolin (ANCEF) IVPB (premix in dextrose) 2,000 mg 50 mL, Q8H, Last Rate: 2,000 mg (05/17/25 0617)    clopidogrel (PLAVIX) tablet 75 mg, Daily    famotidine (PEPCID) tablet 20 mg, BID    glipiZIDE (GLUCOTROL XL) 24 hr tablet 10 mg, BID AC    insulin glargine (LANTUS) subcutaneous injection 10 Units 0.1 mL, HS    insulin lispro (HumALOG/ADMELOG) 100 units/mL subcutaneous injection 1-5 Units, TID AC **AND** Fingerstick Glucose (POCT), TID AC    insulin lispro (HumALOG/ADMELOG) 100 units/mL subcutaneous injection 1-5 Units, HS    lactated ringers infusion, Continuous, Last Rate: 75 mL/hr (05/17/25 1254)    lidocaine (LIDODERM) 5 % patch 1 patch, Daily    lisinopril (ZESTRIL) tablet 20 mg, BID    metFORMIN (GLUCOPHAGE-XR) 24 hr tablet 500 mg, Daily With Dinner    metroNIDAZOLE (FLAGYL) tablet  500 mg, Q12H SANDRA    montelukast (SINGULAIR) tablet 10 mg, HS    morphine injection 2 mg, Q4H PRN    multivitamin-minerals (CENTRUM) tablet 1 tablet, Daily    ondansetron (ZOFRAN) injection 4 mg, Q6H PRN    oxyCODONE (ROXICODONE) IR tablet 5 mg, Q4H PRN **OR** oxyCODONE (ROXICODONE) immediate release tablet 10 mg, Q4H PRN    predniSONE tablet 5 mg, Daily    senna (SENOKOT) tablet 8.6 mg, Daily    Administrative Statements   Today, Patient Was Seen By: Bean Sweeney, DO  I have spent a total time of 35 minutes in caring for this patient on the day of the visit/encounter including Patient and family education, Counseling / Coordination of care, Documenting in the medical record, Reviewing/placing orders in the medical record (including tests, medications, and/or procedures), and Communicating with other healthcare professionals .    **Please Note: This note may have been constructed using a voice recognition system.**

## 2025-05-18 ENCOUNTER — APPOINTMENT (INPATIENT)
Dept: RADIOLOGY | Facility: HOSPITAL | Age: 80
DRG: 853 | End: 2025-05-18
Payer: COMMERCIAL

## 2025-05-18 LAB
2HR DELTA HS TROPONIN: -8 NG/L
ANION GAP SERPL CALCULATED.3IONS-SCNC: 5 MMOL/L (ref 4–13)
ATRIAL RATE: 84 BPM
ATRIAL RATE: 92 BPM
BACTERIA SPEC ANAEROBE CULT: NORMAL
BUN SERPL-MCNC: 10 MG/DL (ref 5–25)
CALCIUM SERPL-MCNC: 8 MG/DL (ref 8.4–10.2)
CARDIAC TROPONIN I PNL SERPL HS: 155 NG/L (ref ?–50)
CARDIAC TROPONIN I PNL SERPL HS: 163 NG/L (ref ?–50)
CHLORIDE SERPL-SCNC: 103 MMOL/L (ref 96–108)
CO2 SERPL-SCNC: 26 MMOL/L (ref 21–32)
CREAT SERPL-MCNC: 0.83 MG/DL (ref 0.6–1.3)
ERYTHROCYTE [DISTWIDTH] IN BLOOD BY AUTOMATED COUNT: 15 % (ref 11.6–15.1)
GFR SERPL CREATININE-BSD FRML MDRD: 83 ML/MIN/1.73SQ M
GLUCOSE SERPL-MCNC: 182 MG/DL (ref 65–140)
GLUCOSE SERPL-MCNC: 185 MG/DL (ref 65–140)
GLUCOSE SERPL-MCNC: 227 MG/DL (ref 65–140)
GLUCOSE SERPL-MCNC: 55 MG/DL (ref 65–140)
GLUCOSE SERPL-MCNC: 61 MG/DL (ref 65–140)
GLUCOSE SERPL-MCNC: 77 MG/DL (ref 65–140)
HCT VFR BLD AUTO: 21.9 % (ref 36.5–49.3)
HGB BLD-MCNC: 7 G/DL (ref 12–17)
MCH RBC QN AUTO: 29.9 PG (ref 26.8–34.3)
MCHC RBC AUTO-ENTMCNC: 32 G/DL (ref 31.4–37.4)
MCV RBC AUTO: 94 FL (ref 82–98)
P AXIS: 63 DEGREES
P AXIS: 66 DEGREES
PLATELET # BLD AUTO: 307 THOUSANDS/UL (ref 149–390)
PMV BLD AUTO: 9.8 FL (ref 8.9–12.7)
POTASSIUM SERPL-SCNC: 4 MMOL/L (ref 3.5–5.3)
PR INTERVAL: 142 MS
PR INTERVAL: 142 MS
QRS AXIS: 61 DEGREES
QRS AXIS: 68 DEGREES
QRSD INTERVAL: 84 MS
QRSD INTERVAL: 84 MS
QT INTERVAL: 350 MS
QT INTERVAL: 376 MS
QTC INTERVAL: 433 MS
QTC INTERVAL: 445 MS
RBC # BLD AUTO: 2.34 MILLION/UL (ref 3.88–5.62)
SODIUM SERPL-SCNC: 134 MMOL/L (ref 135–147)
T WAVE AXIS: -51 DEGREES
T WAVE AXIS: 158 DEGREES
VENTRICULAR RATE: 84 BPM
VENTRICULAR RATE: 92 BPM
WBC # BLD AUTO: 9.66 THOUSAND/UL (ref 4.31–10.16)

## 2025-05-18 PROCEDURE — 99232 SBSQ HOSP IP/OBS MODERATE 35: CPT | Performed by: INTERNAL MEDICINE

## 2025-05-18 PROCEDURE — 94640 AIRWAY INHALATION TREATMENT: CPT

## 2025-05-18 PROCEDURE — 80048 BASIC METABOLIC PNL TOTAL CA: CPT | Performed by: INTERNAL MEDICINE

## 2025-05-18 PROCEDURE — 93010 ELECTROCARDIOGRAM REPORT: CPT | Performed by: STUDENT IN AN ORGANIZED HEALTH CARE EDUCATION/TRAINING PROGRAM

## 2025-05-18 PROCEDURE — 82948 REAGENT STRIP/BLOOD GLUCOSE: CPT

## 2025-05-18 PROCEDURE — 84484 ASSAY OF TROPONIN QUANT: CPT | Performed by: INTERNAL MEDICINE

## 2025-05-18 PROCEDURE — 85027 COMPLETE CBC AUTOMATED: CPT | Performed by: INTERNAL MEDICINE

## 2025-05-18 PROCEDURE — 71045 X-RAY EXAM CHEST 1 VIEW: CPT

## 2025-05-18 PROCEDURE — RECHECK: Performed by: INTERNAL MEDICINE

## 2025-05-18 PROCEDURE — 94760 N-INVAS EAR/PLS OXIMETRY 1: CPT

## 2025-05-18 PROCEDURE — P9040 RBC LEUKOREDUCED IRRADIATED: HCPCS

## 2025-05-18 PROCEDURE — 30233N1 TRANSFUSION OF NONAUTOLOGOUS RED BLOOD CELLS INTO PERIPHERAL VEIN, PERCUTANEOUS APPROACH: ICD-10-PCS | Performed by: INTERNAL MEDICINE

## 2025-05-18 PROCEDURE — 93005 ELECTROCARDIOGRAM TRACING: CPT

## 2025-05-18 PROCEDURE — 94664 DEMO&/EVAL PT USE INHALER: CPT

## 2025-05-18 RX ORDER — LEVALBUTEROL INHALATION SOLUTION 1.25 MG/3ML
1.25 SOLUTION RESPIRATORY (INHALATION) EVERY 6 HOURS PRN
Status: DISCONTINUED | OUTPATIENT
Start: 2025-05-18 | End: 2025-05-28 | Stop reason: HOSPADM

## 2025-05-18 RX ORDER — LEVALBUTEROL INHALATION SOLUTION 1.25 MG/3ML
1.25 SOLUTION RESPIRATORY (INHALATION)
Status: DISCONTINUED | OUTPATIENT
Start: 2025-05-18 | End: 2025-05-18

## 2025-05-18 RX ORDER — FUROSEMIDE 10 MG/ML
40 INJECTION INTRAMUSCULAR; INTRAVENOUS ONCE
Status: COMPLETED | OUTPATIENT
Start: 2025-05-18 | End: 2025-05-18

## 2025-05-18 RX ADMIN — CEFAZOLIN SODIUM 2000 MG: 2 SOLUTION INTRAVENOUS at 15:18

## 2025-05-18 RX ADMIN — OXYCODONE 5 MG: 5 TABLET ORAL at 02:57

## 2025-05-18 RX ADMIN — MORPHINE SULFATE 2 MG: 2 INJECTION, SOLUTION INTRAMUSCULAR; INTRAVENOUS at 03:25

## 2025-05-18 RX ADMIN — SENNOSIDES 8.6 MG: 8.6 TABLET, FILM COATED ORAL at 08:00

## 2025-05-18 RX ADMIN — PREDNISONE 5 MG: 5 TABLET ORAL at 08:00

## 2025-05-18 RX ADMIN — METFORMIN ER 500 MG 500 MG: 500 TABLET ORAL at 17:23

## 2025-05-18 RX ADMIN — Medication 1 TABLET: at 08:00

## 2025-05-18 RX ADMIN — ACETAMINOPHEN 975 MG: 325 TABLET, FILM COATED ORAL at 06:00

## 2025-05-18 RX ADMIN — FAMOTIDINE 20 MG: 20 TABLET, FILM COATED ORAL at 08:00

## 2025-05-18 RX ADMIN — APIXABAN 5 MG: 5 TABLET, FILM COATED ORAL at 17:23

## 2025-05-18 RX ADMIN — GLIPIZIDE 10 MG: 5 TABLET, FILM COATED, EXTENDED RELEASE ORAL at 17:23

## 2025-05-18 RX ADMIN — LISINOPRIL 20 MG: 20 TABLET ORAL at 08:00

## 2025-05-18 RX ADMIN — GLIPIZIDE 10 MG: 5 TABLET, FILM COATED, EXTENDED RELEASE ORAL at 06:00

## 2025-05-18 RX ADMIN — FUROSEMIDE 40 MG: 10 INJECTION, SOLUTION INTRAVENOUS at 23:32

## 2025-05-18 RX ADMIN — CEFAZOLIN SODIUM 2000 MG: 2 SOLUTION INTRAVENOUS at 06:02

## 2025-05-18 RX ADMIN — INSULIN LISPRO 1 UNITS: 100 INJECTION, SOLUTION INTRAVENOUS; SUBCUTANEOUS at 11:48

## 2025-05-18 RX ADMIN — APIXABAN 5 MG: 5 TABLET, FILM COATED ORAL at 08:00

## 2025-05-18 RX ADMIN — ACETAMINOPHEN 975 MG: 325 TABLET, FILM COATED ORAL at 14:09

## 2025-05-18 RX ADMIN — BUDESONIDE, GLYCOPYRROLATE, AND FORMOTEROL FUMARATE 2 PUFF: 160; 9; 4.8 AEROSOL, METERED RESPIRATORY (INHALATION) at 08:00

## 2025-05-18 RX ADMIN — LISINOPRIL 20 MG: 20 TABLET ORAL at 23:33

## 2025-05-18 RX ADMIN — BUDESONIDE, GLYCOPYRROLATE, AND FORMOTEROL FUMARATE 2 PUFF: 160; 9; 4.8 AEROSOL, METERED RESPIRATORY (INHALATION) at 23:33

## 2025-05-18 RX ADMIN — INSULIN LISPRO 1 UNITS: 100 INJECTION, SOLUTION INTRAVENOUS; SUBCUTANEOUS at 23:34

## 2025-05-18 RX ADMIN — CEFAZOLIN SODIUM 2000 MG: 2 SOLUTION INTRAVENOUS at 23:33

## 2025-05-18 RX ADMIN — METRONIDAZOLE 500 MG: 500 TABLET ORAL at 23:33

## 2025-05-18 RX ADMIN — ATORVASTATIN CALCIUM 40 MG: 40 TABLET, FILM COATED ORAL at 17:23

## 2025-05-18 RX ADMIN — ATENOLOL 25 MG: 25 TABLET ORAL at 08:00

## 2025-05-18 RX ADMIN — ACETAMINOPHEN 975 MG: 325 TABLET, FILM COATED ORAL at 23:32

## 2025-05-18 RX ADMIN — LEVALBUTEROL HYDROCHLORIDE 1.25 MG: 1.25 SOLUTION RESPIRATORY (INHALATION) at 20:27

## 2025-05-18 RX ADMIN — AMLODIPINE BESYLATE 5 MG: 5 TABLET ORAL at 08:00

## 2025-05-18 RX ADMIN — MONTELUKAST 10 MG: 10 TABLET, FILM COATED ORAL at 23:33

## 2025-05-18 RX ADMIN — INSULIN LISPRO 2 UNITS: 100 INJECTION, SOLUTION INTRAVENOUS; SUBCUTANEOUS at 17:23

## 2025-05-18 RX ADMIN — METRONIDAZOLE 500 MG: 500 TABLET ORAL at 08:00

## 2025-05-18 RX ADMIN — LIDOCAINE 1 PATCH: 50 PATCH CUTANEOUS at 23:33

## 2025-05-18 RX ADMIN — FAMOTIDINE 20 MG: 20 TABLET, FILM COATED ORAL at 17:23

## 2025-05-18 RX ADMIN — ALBUTEROL SULFATE 1 PUFF: 90 AEROSOL, METERED RESPIRATORY (INHALATION) at 13:39

## 2025-05-18 RX ADMIN — ALBUTEROL SULFATE 1 PUFF: 90 AEROSOL, METERED RESPIRATORY (INHALATION) at 18:16

## 2025-05-18 RX ADMIN — CLOPIDOGREL BISULFATE 75 MG: 75 TABLET, FILM COATED ORAL at 08:00

## 2025-05-18 NOTE — ASSESSMENT & PLAN NOTE
Improved, airway maintained  Patient on mysoline, atorvastatin and losartan/Jardiance which could cause angioedema/ urticaria-on hold  · Rash was mostly on trunk and upper thigh and is very pruritic; has lip swelling, no tongue  · Given dose of 125 mg solumedrol, benadryl 25 mg IV bid, and pepcid 20 mg IV in ER but symptoms returned   · Received solumedrol, benadryl, pepcid   · Continue calamine, hydrocortisone   · [facility]  No exacerbation

## 2025-05-18 NOTE — PROGRESS NOTES
Treatment plan    Notified by nursing patient was using urinal and then became short of breath.  Does have history of asthma on Breztri.  Will order Xopenex.    Patient did receive 1 unit PRBC today.  May need furosemide if no improvement.    ECG with ST depressions lateral leads which has been intermittently present in the past.  Will check troponins and serial ECGs.  Place on telemetry.    Bean Sweeney, DO FACP

## 2025-05-18 NOTE — PROGRESS NOTES
Progress Note - Hospitalist   Name: Gold Van 79 y.o. 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  Podiatry to decide on further washout versus continuing VAC  Follow-up on intraoperative cultures: Currently with [facility]  Severe peripheral arterial disease (HCC)  Transferred here for vascular surgery intervention  Underwent right CFA SFA endarterectomy/stenting 5/13/2025  Aspirin discontinued.  Started on clopidogrel for stents  Postoperative anemia  Iron studies B12 folic acid ordered  Will transfuse 1 unit PRBC as patient is now symptomatic and debilitated    Results from last 7 days   Lab Units 05/18/25  0440 05/17/25  1751 05/17/25  0542 05/16/25  1642 05/16/25  0545   HEMOGLOBIN g/dL 7.0* 7.7* 7.2*   < > 7.4*   MCV fL 94  --  94  --  91    < > = values in this interval not displayed.     Type 2 diabetes mellitus with complication, without long-term current use of insulin ([facility])  Lab Results   Component Value Date    HGBA1C 9.3 (H) 04/09/2025     Recent Labs     05/17/25  2105 05/18/25  0658 05/18/25  0734 05/18/25  1126   POCGLU 158* 55* 77 185*     Prior to admission glipizide linagliptin and metformin  A1c 9.3.  Endocrinology consulted by vascular surgery  Continue metformin.  Restarted glipizide and did have hypoglycemia this morning.  Discussed need to hold evening glipizide if oral intake is suboptimal  PAF (paroxysmal atrial fibrillation) ([facility])  Continue atenolol.  Anticoagulation: Apixaban was on hold with  heparin infusion.  Apixaban has been restarted.  Essential hypertension  Continue amlodipine atenolol and lisinopril  Mild intermittent asthma without complication  [facility]  No exacerbation  Pruritus  Chronic pruritus follows with dermatology as an outpatient on prednisone 5 mg daily  Cardiomyopathy (HCC)  Last known LVEF 50% echocardiogram   Currently compensated.  DC'ed IV fluids    VTE Pharmacologic Prophylaxis: VTE Score: 5 High Risk (Score >/= 5) - Pharmacological DVT Prophylaxis Ordered: apixaban (Eliquis). Sequential Compression Devices Ordered.    Mobility:   Basic Mobility Inpatient Raw Score: 17  JH-HLM Goal: 5: Stand one or more mins  JH-HLM Achieved: 5: Stand (1 or more minutes)  JH-HLM Goal achieved. Continue to encourage appropriate mobility.    Patient Centered Rounds: I have performed bedside rounds with nursing staff today.  Discussions with Specialists or Other Care Team Provider:     Education and Discussions with Family / Patient: Updated  (wife) via phone.    Current Length of Stay: 7 day(s)  Current Patient Status: Inpatient   Certification Statement: The patient will continue to require additional inpatient hospital stay due to anemia, hypoglycemia, further podiatric intervention  Discharge Plan: Anticipate discharge in 24-48 hrs to home when cleared by podiatry.  Family concerned that he may need short-term rehab postdischarge and will await PT/OT reevaluations as he is nonweightbearing    Code Status: Level 1 - Full Code    Subjective   Patient seen and examined.  Had a lot of bleeding from foot yesterday.  Feeling weak today    Objective   Vitals:   Temp (24hrs), Av.2 °F (36.8 °C), Min:97.9 °F (36.6 °C), Max:98.9 °F (37.2 °C)    Temp:  [97.9 °F (36.6 °C)-98.9 °F (37.2 °C)] 98.1 °F (36.7 °C)  HR:  [81-90] 81  Resp:  [16-18] 17  BP: (107-134)/(61-66) 107/61  SpO2:  [90 %-97 %] 90 %  Body mass index is 23.38 kg/m².     Input and Output Summary (last  24 hours):     Intake/Output Summary (Last 24 hours) at 5/18/2025 1130  Last data filed at 5/18/2025 0207  Gross per 24 hour   Intake 1145 ml   Output 530 ml   Net 615 ml       Physical Exam  Vitals reviewed.   Constitutional:       General: He is not in acute distress.  HENT:      Head: Atraumatic.     Eyes:      Extraocular Movements: Extraocular movements intact.      Pupils: Pupils are equal, round, and reactive to light.       Cardiovascular:      Rate and Rhythm: Regular rhythm.   Pulmonary:      Effort: Pulmonary effort is normal.      Breath sounds: Decreased breath sounds present. No wheezing.   Abdominal:      General: Bowel sounds are normal.      Palpations: Abdomen is soft.      Tenderness: There is no abdominal tenderness. There is no rebound.     Musculoskeletal:         General: Deformity (Right foot wrapped) present. No swelling or tenderness.     Skin:     General: Skin is warm and dry.     Neurological:      General: No focal deficit present.      Mental Status: He is alert.      Motor: No weakness.     Psychiatric:         Mood and Affect: Mood normal.       Lines/Drains:  Invasive Devices       Peripheral Intravenous Line  Duration             Peripheral IV 05/17/25 Right Antecubital 1 day                        Lab Results: I have reviewed the following results:   Results from last 7 days   Lab Units 05/18/25  0440 05/17/25  1751 05/17/25  0542 05/16/25  1642 05/16/25  0545   WBC Thousand/uL 9.66  --  10.46*  --  10.50*   HEMOGLOBIN g/dL 7.0* 7.7* 7.2*   < > 7.4*   PLATELETS Thousands/uL 307  --  264  --  236   MCV fL 94  --  94  --  91    < > = values in this interval not displayed.     Results from last 7 days   Lab Units 05/18/25  0440 05/17/25  0542 05/16/25  0545 05/15/25  0445 05/14/25  0436 05/13/25  1512 05/13/25  0455   SODIUM mmol/L 134* 136 134*   < > 134*  --  133*   POTASSIUM mmol/L 4.0 4.5 4.0   < > 4.6  --  4.2   CHLORIDE mmol/L 103 104 103   < > 104  --  101   CO2 mmol/L 26 27  25   < > 23  --  26   CO2, I-STAT   --   --   --   --   --    < >  --    ANION GAP mmol/L 5 5 6   < > 7  --  6   BUN mg/dL 10 11 14   < > 14  --  13   CREATININE mg/dL 0.83 0.90 0.87   < > 1.13  --  0.93   CALCIUM mg/dL 8.0* 8.3* 8.1*   < > 8.1*  --  8.7   ALBUMIN g/dL  --   --  2.6*  --  2.9*  --  3.1*   TOTAL BILIRUBIN mg/dL  --   --  0.30  --  0.38  --  0.33   ALK PHOS U/L  --   --  51  --  52  --  62   ALT U/L  --   --  4*  --  15  --  22   AST U/L  --   --  12*  --  31  --  46*   EGFR ml/min/1.73sq m 83 80 82   < > 61  --  77   GLUCOSE RANDOM mg/dL 61* 87 167*   < > 242*  --  206*    < > = values in this interval not displayed.     Results from last 7 days   Lab Units 05/14/25  0436   MAGNESIUM mg/dL 2.1   PHOSPHORUS mg/dL 4.0                      Results from last 7 days   Lab Units 05/18/25  1126 05/18/25  0734 05/18/25  0658 05/17/25  2105 05/17/25  1739 05/17/25  1607 05/17/25  1155 05/17/25  0728 05/16/25  2112 05/16/25  1604 05/16/25  1413 05/16/25  1124   POC GLUCOSE mg/dl 185* 77 55* 158* 206* 188* 142* 98 223* 258* 199* 185*               Recent Cultures (last 7 days):   Results from last 7 days   Lab Units 05/16/25  1337   GRAM STAIN RESULT  Rare Gram positive cocci in pairs*  No polys seen*   WOUND CULTURE  Culture too young- will reincubate       Imaging:  Reviewed radiology reports from this admission including:  XR foot 3+ vw right  Result Date: 5/16/2025  Impression: Postop right foot. No acute osseous abnormality. Workstation performed: [id number]       Last 24 Hours Medication List:     Current Facility-Administered Medications:     acetaminophen (TYLENOL) tablet 975 mg, Q8H SANDRA    albuterol (PROVENTIL HFA,VENTOLIN HFA) inhaler 1 puff, Q4H PRN    amLODIPine (NORVASC) tablet 5 mg, BID    apixaban (ELIQUIS) tablet 5 mg, BID    atenolol (TENORMIN) tablet 25 mg, Daily    atorvastatin (LIPITOR) tablet 40 mg, QPM    Budeson-Glycopyrrol-Formoterol 160-9-4.8 MCG/ACT AERO 2 puff, BID    ceFAZolin  (ANCEF) IVPB (premix in dextrose) 2,000 mg 50 mL, Q8H, Last Rate: 2,000 mg (05/18/25 0602)    clopidogrel (PLAVIX) tablet 75 mg, Daily    famotidine (PEPCID) tablet 20 mg, BID    glipiZIDE (GLUCOTROL XL) 24 hr tablet 10 mg, BID AC    insulin lispro (HumALOG/ADMELOG) 100 units/mL subcutaneous injection 1-5 Units, TID AC **AND** Fingerstick Glucose (POCT), TID AC    insulin lispro (HumALOG/ADMELOG) 100 units/mL subcutaneous injection 1-5 Units, HS    lidocaine (LIDODERM) 5 % patch 1 patch, Daily    lisinopril (ZESTRIL) tablet 20 mg, BID    metFORMIN (GLUCOPHAGE-XR) 24 hr tablet 500 mg, Daily With Dinner    metroNIDAZOLE (FLAGYL) tablet 500 mg, Q12H SANDRA    montelukast (SINGULAIR) tablet 10 mg, HS    morphine injection 2 mg, Q4H PRN    multivitamin-minerals (CENTRUM) tablet 1 tablet, Daily    ondansetron (ZOFRAN) injection 4 mg, Q6H PRN    oxyCODONE (ROXICODONE) IR tablet 5 mg, Q4H PRN **OR** oxyCODONE (ROXICODONE) immediate release tablet 10 mg, Q4H PRN    predniSONE tablet 5 mg, Daily    senna (SENOKOT) tablet 8.6 mg, Daily    Administrative Statements   Today, Patient Was Seen By: Bean Sweeney, DO  I have spent a total time of 35 minutes in caring for this patient on the day of the visit/encounter including Patient and family education, Counseling / Coordination of care, Documenting in the medical record, and Reviewing/placing orders in the medical record (including tests, medications, and/or procedures).    **Please Note: This note may have been constructed using a voice recognition system.**

## 2025-05-18 NOTE — PLAN OF CARE
Problem: PAIN - ADULT  Goal: Verbalizes/displays adequate comfort level or baseline comfort level  Description: Interventions:- Encourage patient to monitor pain and request assistance- Assess pain using appropriate pain scale- Administer analgesics based on type and severity of pain and evaluate response- Implement non-pharmacological measures as appropriate and evaluate response- Consider cultural and social influences on pain and pain management- Notify physician/advanced practitioner if interventions unsuccessful or patient reports new pain  Outcome: Progressing     Problem: DISCHARGE PLANNING  Goal: Discharge to home or other facility with appropriate resources  Description: INTERVENTIONS:- Identify barriers to discharge w/patient and caregiver- Arrange for needed discharge resources and transportation as appropriate- Identify discharge learning needs (meds, wound care, etc.)- Arrange for interpretive services to assist at discharge as needed- Refer to Case Management Department for coordinating discharge planning if the patient needs post-hospital services based on physician/advanced practitioner order or complex needs related to functional status, cognitive ability, or social support system  Outcome: Progressing     Problem: INFECTION - ADULT  Goal: Absence or prevention of progression during hospitalization  Description: INTERVENTIONS:  - Assess and monitor for signs and symptoms of infection  - Monitor lab/diagnostic results  - Monitor all insertion sites, i.e. indwelling lines, tubes, and drains  - Monitor endotracheal if appropriate and nasal secretions for changes in amount and color  - [place] appropriate cooling/warming therapies per order  - Administer medications as ordered  - Instruct and encourage patient and family to use good hand hygiene technique  - Identify and instruct in appropriate isolation precautions for identified infection/condition  Outcome: Progressing  Goal: Absence of  fever/infection during neutropenic period  Description: INTERVENTIONS:  - Monitor WBC    Outcome: Progressing     Problem: SAFETY ADULT  Goal: Patient will remain free of falls  Description: INTERVENTIONS:  - Educate patient/family on patient safety including physical limitations  - Instruct patient to call for assistance with activity   - Consult OT/PT to assist with strengthening/mobility   - Keep Call bell within reach  - Keep bed low and locked with side rails adjusted as appropriate  - Keep care items and personal belongings within reach  - Initiate and maintain comfort rounds  - Make Fall Risk Sign visible to staff  - Offer Toileting every 2 Hours, in advance of need  - Initiate/Maintain bed alarm  - Obtain necessary fall risk management equipment:   - Apply yellow socks and bracelet for high fall risk patients  - Consider moving patient to room near nurses station  Outcome: Progressing  Goal: Maintain or return to baseline ADL function  Description: INTERVENTIONS:  -  Assess patient's ability to carry out ADLs; assess patient's baseline for ADL function and identify physical deficits which impact ability to perform ADLs (bathing, care of mouth/teeth, toileting, grooming, dressing, etc.)  - Assess/evaluate cause of self-care deficits   - Assess range of motion  - Assess patient's mobility; develop plan if impaired  - Assess patient's need for assistive devices and provide as appropriate  - Encourage maximum independence but intervene and supervise when necessary  - Involve family in performance of ADLs  - Assess for home care needs following discharge   - Consider OT consult to assist with ADL evaluation and planning for discharge  - Provide patient education as appropriate  Outcome: Progressing  Goal: Maintains/Returns to pre admission functional level  Description: INTERVENTIONS:  - Perform AM-PAC 6 Click Basic Mobility/ Daily Activity assessment daily.  - Set and communicate daily mobility goal to care team  and patient/family/caregiver.   - Collaborate with rehabilitation services on mobility goals if consulted  - Perform Range of Motion 4 times a day.  - Reposition patient every 3 hours.  - Dangle patient 3 times a day  - Stand patient 3 times a day  - Ambulate patient 3 times a day  - Out of bed to chair 3 times a day   - Out of bed for meals 3 times a day  - Out of bed for toileting  - Record patient progress and toleration of activity level   Outcome: Progressing     Problem: Knowledge Deficit  Goal: Patient/family/caregiver demonstrates understanding of disease process, treatment plan, medications, and discharge instructions  Description: Complete learning assessment and assess knowledge base.  Interventions:  - Provide teaching at level of understanding  - Provide teaching via preferred learning methods  Outcome: Progressing     Problem: SKIN/TISSUE INTEGRITY - ADULT  Goal: Skin Integrity remains intact(Skin Breakdown Prevention)  Description: Assess:-Perform Sae assessment -Clean and moisturize skin -Inspect skin when repositioning, toileting, and assisting with ADLS-Assess under medical devices -Assess extremities for adequate circulation and sensation Bed Management:-Have minimal linens on bed & keep smooth, unwrinkled-Change linens as needed when moist or perspiring-Avoid sitting or lying in one position for more than 2 hours while in bed-Keep HOB at 30 degrees Toileting:-Offer bedside commode-Assess for incontinence -Use incontinent care products after each incontinent episode Activity:-Mobilize patient 3 times a day-Encourage activity and walks on unit-Encourage or provide ROM exercises -Turn and reposition patient every 2 Hours-Use appropriate equipment to lift or move patient in bed-Instruct/ Assist with weight shifting  when out of bed in chair-Consider limitation of chair time 2 hour intervalsSkin Care:-Avoid use of baby powder, tape, friction and shearing, hot water or constrictive clothing-Relieve  pressure over bony prominences Do not massage red bony areasNext Steps:-Teach patient strategies to minimize risks Consider consults to  interdisciplinary teams   Outcome: Progressing  Goal: Incision(s), wounds(s) or drain site(s) healing without S/S of infection  Description: INTERVENTIONS- Assess and document dressing, incision, wound bed, drain sites and surrounding tissue- Provide patient and family education- Perform skin care/dressing changes every shift  Outcome: Progressing  Goal: Pressure injury heals and does not worsen  Description: Interventions:- Implement low air loss mattress or specialty surface (Criteria met)- Apply silicone foam dressing- Instruct/assist with weight shifting every 120 minutes when in chair - Limit chair time to 2 hour intervals- Use special pressure reducing interventions such as turning when in chair - Apply fecal or urinary incontinence containment device - Perform passive or active ROM every 2 hours- Turn and reposition patient & offload bony prominences every 2 hours - Utilize friction reducing device or surface for transfers - Consider consults to  interdisciplinary teams such as wound- Use incontinent care products after each incontinent episode such as wipes- Consider nutrition services referral as needed  Outcome: Progressing     Problem: METABOLIC, FLUID AND ELECTROLYTES - ADULT  Goal: Electrolytes maintained within normal limits  Description: INTERVENTIONS:  - Monitor labs and assess patient for signs and symptoms of electrolyte imbalances  - Administer electrolyte replacement as ordered  - Monitor response to electrolyte replacements, including repeat lab results as appropriate  - Instruct patient on fluid and nutrition as appropriate  Outcome: Progressing     Problem: HEMATOLOGIC - ADULT  Goal: Maintains hematologic stability  Description: INTERVENTIONS  - Assess for signs and symptoms of bleeding or hemorrhage  - Monitor labs  - Administer supportive blood  products/factors as ordered and appropriate  Outcome: Progressing     Problem: Nutrition/Hydration-ADULT  Goal: Nutrient/Hydration intake appropriate for improving, restoring or maintaining nutritional needs  Description: Monitor and assess patient's nutrition/hydration status for malnutrition. Collaborate with interdisciplinary team and initiate plan and interventions as ordered.  Monitor patient's weight and dietary intake as ordered or per policy. Utilize nutrition screening tool and intervene as necessary. Determine patient's food preferences and provide high-protein, high-caloric foods as appropriate. INTERVENTIONS:- Monitor oral intake, urinary output, labs, and treatment plans- Assess nutrition and hydration status and recommend course of action- Evaluate amount of meals eaten- Assist patient with eating if necessary - Allow adequate time for meals- Recommend/ encourage appropriate diets, oral nutritional supplements, and vitamin/mineral supplements- Order, calculate, and assess calorie counts as needed- Recommend, monitor, and adjust tube feedings and TPN/PPN based on assessed needs- Assess need for intravenous fluids- Provide specific nutrition/hydration education as appropriate- Include patient/family/caregiver in decisions related to nutrition  Outcome: Progressing     Problem: Prexisting or High Potential for Compromised Skin Integrity  Goal: Skin integrity is maintained or improved  Description: INTERVENTIONS:  - Identify patients at risk for skin breakdown  - Assess and monitor skin integrity  - Assess and monitor nutrition and hydration status  - Monitor labs   - Assess for incontinence   - Turn and reposition patient  - Assist with mobility/ambulation  - Relieve pressure over bony prominences  - Avoid friction and shearing  - Provide appropriate hygiene as needed including keeping skin clean and dry  - Evaluate need for skin moisturizer/barrier cream  - Collaborate with interdisciplinary team   -  Patient/family teaching  - Consider wound care consult   Outcome: Progressing

## 2025-05-18 NOTE — ASSESSMENT & PLAN NOTE
Lab Results   Component Value Date    HGBA1C 9.3 (H) 04/09/2025     Recent Labs     05/17/25  2105 05/18/25  0658 05/18/25  0734 05/18/25  1126   POCGLU 158* 55* 77 185*     Prior to admission glipizide linagliptin and metformin  A1c 9.3.  Endocrinology consulted by vascular surgery  Continue metformin.  Restarted glipizide and did have hypoglycemia this morning.  Discussed need to hold evening glipizide if oral intake is suboptimal

## 2025-05-18 NOTE — ASSESSMENT & PLAN NOTE
78 yo male ex-smoker w/ a hx of DM II (A1c 9.3), HTN, HLD, asthma, CVA (9/2024), PAF (eliquis), cardiomyopathy and PAD presented to [facility] on 5/7/25 w/ R foot pain x1 week and non-healing R 5th met wound x4-5 weeks. Pt admitted and started on ABX. Pt was transferred to [facility] on 5/11/25 for vascular surgical intervention.     Vascular surgery consulted for worsening R 5th met wound in the setting of PAD. CTA abd shows L CFA/SFA stenosis w/ focal occlusions and AT/peroneal occlusions w/ reconstitution. LEAD shows R PTA occlusion and R RIC: 0.42/15/16.    Pt is S/P R CFA/SFA endarterectomy w/ bovine pericardial patch, DCB and stenting of SFA, and angio of AT on 5/13.   Pt is S/P R partial 5th ray resection by podiatry on 5/16.    Diagnostics:  -5/6/25 CTA abd w/ runoff: B/L EDY/EIA/IIA patent w/ atherosclerosis. Right: Focal high-grade stenosis of distal R CFA and diffuse atherosclerotic disease of R SFA resulting in moderate to severe stenoses with focal near complete occlusions at the proximal and distal segments. Short segment occlusions of proximal R AT and peroneal arteries with reconstitution. R PTA is occluded. Left: Occluded L SFA with reconstitution. L YUNG and PTA occluded. One-vessel runoff LLE through peroneal artery.  -4/18/25 LEAD: Right: >75% prox SFA and 50-75% dSFA stenosis. Distal PTA occlusion. R RIC: 0.42/15/16. Left: PTA and YUNG occlusions. L RIC: 1.06/85/45.   -4/17/25 MRI R foot: Possible early OM in R 5th met    Plan:  -R 5th met wound (+) OM in the setting of PAD  -S/P R CFA/SFA endarterectomy w/ bovine pericardial patch, DCB and stenting of SFA, and angio of AT on 5/13 (POD #6)  -Continue daily incision care to R groin by nursing   -Continue plavix (new SFA stents) and lipitor for medical management of PAD   -Continue eliquis for hx of afib  -Podiatry following, S/P R partial 5th ray resection on 5/16  -Continue medical management per primary team  -Pt is stable for discharge from a vascular  surgery standpoint  -Will continue to follow peripherally while pt remains hospitalized  -Plan follow up outpt in vascular surgery office; appt scheduled for 6/5/25  -Will discuss w/ Dr. Hills

## 2025-05-18 NOTE — PLAN OF CARE
Problem: PAIN - ADULT  Goal: Verbalizes/displays adequate comfort level or baseline comfort level  Description: Interventions:- Encourage patient to monitor pain and request assistance- Assess pain using appropriate pain scale- Administer analgesics based on type and severity of pain and evaluate response- Implement non-pharmacological measures as appropriate and evaluate response- Consider cultural and social influences on pain and pain management- Notify physician/advanced practitioner if interventions unsuccessful or patient reports new pain  Outcome: Progressing     Problem: DISCHARGE PLANNING  Goal: Discharge to home or other facility with appropriate resources  Description: INTERVENTIONS:- Identify barriers to discharge w/patient and caregiver- Arrange for needed discharge resources and transportation as appropriate- Identify discharge learning needs (meds, wound care, etc.)- Arrange for interpretive services to assist at discharge as needed- Refer to Case Management Department for coordinating discharge planning if the patient needs post-hospital services based on physician/advanced practitioner order or complex needs related to functional status, cognitive ability, or social support system  Outcome: Progressing     Problem: INFECTION - ADULT  Goal: Absence or prevention of progression during hospitalization  Description: INTERVENTIONS:  - Assess and monitor for signs and symptoms of infection  - Monitor lab/diagnostic results  - Monitor all insertion sites, i.e. indwelling lines, tubes, and drains  - Monitor endotracheal if appropriate and nasal secretions for changes in amount and color  - [place] appropriate cooling/warming therapies per order  - Administer medications as ordered  - Instruct and encourage patient and family to use good hand hygiene technique  - Identify and instruct in appropriate isolation precautions for identified infection/condition  Outcome: Progressing  Goal: Absence of  fever/infection during neutropenic period  Description: INTERVENTIONS:  - Monitor WBC    Outcome: Progressing     Problem: INFECTION - ADULT  Goal: Absence of fever/infection during neutropenic period  Description: INTERVENTIONS:  - Monitor WBC    Outcome: Progressing     Problem: SAFETY ADULT  Goal: Patient will remain free of falls  Description: INTERVENTIONS:  - Educate patient/family on patient safety including physical limitations  - Instruct patient to call for assistance with activity   - Consult OT/PT to assist with strengthening/mobility   - Keep Call bell within reach  - Keep bed low and locked with side rails adjusted as appropriate  - Keep care items and personal belongings within reach  - Initiate and maintain comfort rounds  - Make Fall Risk Sign visible to staff  - Offer Toileting every 2 Hours, in advance of need  - Initiate/Maintain bed alarm  - Obtain necessary fall risk management equipment:   - Apply yellow socks and bracelet for high fall risk patients  - Consider moving patient to room near nurses station  Outcome: Progressing  Goal: Maintain or return to baseline ADL function  Description: INTERVENTIONS:  -  Assess patient's ability to carry out ADLs; assess patient's baseline for ADL function and identify physical deficits which impact ability to perform ADLs (bathing, care of mouth/teeth, toileting, grooming, dressing, etc.)  - Assess/evaluate cause of self-care deficits   - Assess range of motion  - Assess patient's mobility; develop plan if impaired  - Assess patient's need for assistive devices and provide as appropriate  - Encourage maximum independence but intervene and supervise when necessary  - Involve family in performance of ADLs  - Assess for home care needs following discharge   - Consider OT consult to assist with ADL evaluation and planning for discharge  - Provide patient education as appropriate  Outcome: Progressing  Goal: Maintains/Returns to pre admission functional  level  Description: INTERVENTIONS:  - Perform AM-PAC 6 Click Basic Mobility/ Daily Activity assessment daily.  - Set and communicate daily mobility goal to care team and patient/family/caregiver.   - Collaborate with rehabilitation services on mobility goals if consulted  - Perform Range of Motion 4 times a day.  - Reposition patient every 3 hours.  - Dangle patient 3 times a day  - Stand patient 3 times a day  - Ambulate patient 3 times a day  - Out of bed to chair 3 times a day   - Out of bed for meals 3 times a day  - Out of bed for toileting  - Record patient progress and toleration of activity level   Outcome: Progressing     Problem: Knowledge Deficit  Goal: Patient/family/caregiver demonstrates understanding of disease process, treatment plan, medications, and discharge instructions  Description: Complete learning assessment and assess knowledge base.  Interventions:  - Provide teaching at level of understanding  - Provide teaching via preferred learning methods  Outcome: Progressing     Problem: SKIN/TISSUE INTEGRITY - ADULT  Goal: Skin Integrity remains intact(Skin Breakdown Prevention)  Description: Assess:-Perform Sae assessment -Clean and moisturize skin -Inspect skin when repositioning, toileting, and assisting with ADLS-Assess under medical devices -Assess extremities for adequate circulation and sensation Bed Management:-Have minimal linens on bed & keep smooth, unwrinkled-Change linens as needed when moist or perspiring-Avoid sitting or lying in one position for more than 2 hours while in bed-Keep HOB at 30 degrees Toileting:-Offer bedside commode-Assess for incontinence -Use incontinent care products after each incontinent episode Activity:-Mobilize patient 3 times a day-Encourage activity and walks on unit-Encourage or provide ROM exercises -Turn and reposition patient every 2 Hours-Use appropriate equipment to lift or move patient in bed-Instruct/ Assist with weight shifting  when out of bed in  chair-Consider limitation of chair time 2 hour intervalsSkin Care:-Avoid use of baby powder, tape, friction and shearing, hot water or constrictive clothing-Relieve pressure over bony prominences Do not massage red bony areasNext Steps:-Teach patient strategies to minimize risks Consider consults to  interdisciplinary teams   Outcome: Progressing  Goal: Incision(s), wounds(s) or drain site(s) healing without S/S of infection  Description: INTERVENTIONS- Assess and document dressing, incision, wound bed, drain sites and surrounding tissue- Provide patient and family education- Perform skin care/dressing changes every shift  Outcome: Progressing  Goal: Pressure injury heals and does not worsen  Description: Interventions:- Implement low air loss mattress or specialty surface (Criteria met)- Apply silicone foam dressing- Instruct/assist with weight shifting every 120 minutes when in chair - Limit chair time to 2 hour intervals- Use special pressure reducing interventions such as turning when in chair - Apply fecal or urinary incontinence containment device - Perform passive or active ROM every 2 hours- Turn and reposition patient & offload bony prominences every 2 hours - Utilize friction reducing device or surface for transfers - Consider consults to  interdisciplinary teams such as wound- Use incontinent care products after each incontinent episode such as wipes- Consider nutrition services referral as needed  Outcome: Progressing     Problem: SKIN/TISSUE INTEGRITY - ADULT  Goal: Incision(s), wounds(s) or drain site(s) healing without S/S of infection  Description: INTERVENTIONS- Assess and document dressing, incision, wound bed, drain sites and surrounding tissue- Provide patient and family education- Perform skin care/dressing changes every shift  Outcome: Progressing     Problem: HEMATOLOGIC - ADULT  Goal: Maintains hematologic stability  Description: INTERVENTIONS  - Assess for signs and symptoms of bleeding or  hemorrhage  - Monitor labs  - Administer supportive blood products/factors as ordered and appropriate  Outcome: Progressing     Problem: METABOLIC, FLUID AND ELECTROLYTES - ADULT  Goal: Electrolytes maintained within normal limits  Description: INTERVENTIONS:  - Monitor labs and assess patient for signs and symptoms of electrolyte imbalances  - Administer electrolyte replacement as ordered  - Monitor response to electrolyte replacements, including repeat lab results as appropriate  - Instruct patient on fluid and nutrition as appropriate  Outcome: Progressing     Problem: Nutrition/Hydration-ADULT  Goal: Nutrient/Hydration intake appropriate for improving, restoring or maintaining nutritional needs  Description: Monitor and assess patient's nutrition/hydration status for malnutrition. Collaborate with interdisciplinary team and initiate plan and interventions as ordered.  Monitor patient's weight and dietary intake as ordered or per policy. Utilize nutrition screening tool and intervene as necessary. Determine patient's food preferences and provide high-protein, high-caloric foods as appropriate. INTERVENTIONS:- Monitor oral intake, urinary output, labs, and treatment plans- Assess nutrition and hydration status and recommend course of action- Evaluate amount of meals eaten- Assist patient with eating if necessary - Allow adequate time for meals- Recommend/ encourage appropriate diets, oral nutritional supplements, and vitamin/mineral supplements- Order, calculate, and assess calorie counts as needed- Recommend, monitor, and adjust tube feedings and TPN/PPN based on assessed needs- Assess need for intravenous fluids- Provide specific nutrition/hydration education as appropriate- Include patient/family/caregiver in decisions related to nutrition  Outcome: Progressing     Problem: Prexisting or High Potential for Compromised Skin Integrity  Goal: Skin integrity is maintained or improved  Description: INTERVENTIONS:  -  Identify patients at risk for skin breakdown  - Assess and monitor skin integrity  - Assess and monitor nutrition and hydration status  - Monitor labs   - Assess for incontinence   - Turn and reposition patient  - Assist with mobility/ambulation  - Relieve pressure over bony prominences  - Avoid friction and shearing  - Provide appropriate hygiene as needed including keeping skin clean and dry  - Evaluate need for skin moisturizer/barrier cream  - Collaborate with interdisciplinary team   - Patient/family teaching  - Consider wound care consult   Outcome: Progressing

## 2025-05-19 ENCOUNTER — APPOINTMENT (INPATIENT)
Dept: NON INVASIVE DIAGNOSTICS | Facility: HOSPITAL | Age: 80
DRG: 853 | End: 2025-05-19
Payer: COMMERCIAL

## 2025-05-19 PROBLEM — R79.89 TROPONIN I ABOVE REFERENCE RANGE: Status: ACTIVE | Noted: 2025-05-19

## 2025-05-19 PROBLEM — E87.70 VOLUME OVERLOAD: Status: ACTIVE | Noted: 2025-05-19

## 2025-05-19 PROBLEM — I50.32 CHRONIC HEART FAILURE WITH PRESERVED EJECTION FRACTION (HFPEF) (HCC): Status: ACTIVE | Noted: 2025-04-23

## 2025-05-19 PROBLEM — M86.9 OSTEOMYELITIS (HCC): Status: ACTIVE | Noted: 2025-05-19

## 2025-05-19 LAB
4HR DELTA HS TROPONIN: 30 NG/L
ABO GROUP BLD BPU: NORMAL
ANION GAP SERPL CALCULATED.3IONS-SCNC: 7 MMOL/L (ref 4–13)
AORTIC ROOT: 3.8 CM
ASCENDING AORTA: 3.7 CM
ATRIAL RATE: 88 BPM
BNP SERPL-MCNC: 899 PG/ML (ref 0–100)
BPU ID: NORMAL
BSA FOR ECHO PROCEDURE: 2 M2
BUN SERPL-MCNC: 9 MG/DL (ref 5–25)
CALCIUM SERPL-MCNC: 8.7 MG/DL (ref 8.4–10.2)
CARDIAC TROPONIN I PNL SERPL HS: 193 NG/L (ref ?–50)
CARDIAC TROPONIN I PNL SERPL HS: 237 NG/L (ref 8–18)
CHLORIDE SERPL-SCNC: 99 MMOL/L (ref 96–108)
CO2 SERPL-SCNC: 28 MMOL/L (ref 21–32)
CREAT SERPL-MCNC: 0.98 MG/DL (ref 0.6–1.3)
CROSSMATCH: NORMAL
E WAVE DECELERATION TIME: 113 MS
E/A RATIO: 1.02
ERYTHROCYTE [DISTWIDTH] IN BLOOD BY AUTOMATED COUNT: 15.5 % (ref 11.6–15.1)
FRACTIONAL SHORTENING: 26 (ref 28–44)
GFR SERPL CREATININE-BSD FRML MDRD: 73 ML/MIN/1.73SQ M
GLOBAL LONGITUIDAL STRAIN: -11 %
GLUCOSE SERPL-MCNC: 144 MG/DL (ref 65–140)
GLUCOSE SERPL-MCNC: 197 MG/DL (ref 65–140)
GLUCOSE SERPL-MCNC: 210 MG/DL (ref 65–140)
GLUCOSE SERPL-MCNC: 89 MG/DL (ref 65–140)
GLUCOSE SERPL-MCNC: 90 MG/DL (ref 65–140)
HCT VFR BLD AUTO: 27.6 % (ref 36.5–49.3)
HGB BLD-MCNC: 9 G/DL (ref 12–17)
INTERVENTRICULAR SEPTUM IN DIASTOLE (PARASTERNAL SHORT AXIS VIEW): 1 CM
INTERVENTRICULAR SEPTUM: 1 CM (ref 0.6–1.1)
LAAS-AP2: 17.9 CM2
LAAS-AP4: 25.5 CM2
LEFT ATRIUM SIZE: 3.6 CM
LEFT ATRIUM VOLUME (MOD BIPLANE): 74 ML
LEFT ATRIUM VOLUME INDEX (MOD BIPLANE): 37 ML/M2
LEFT INTERNAL DIMENSION IN SYSTOLE: 3.9 CM (ref 2.1–4)
LEFT VENTRICULAR INTERNAL DIMENSION IN DIASTOLE: 5.3 CM (ref 3.5–6)
LEFT VENTRICULAR POSTERIOR WALL IN END DIASTOLE: 1 CM
LEFT VENTRICULAR STROKE VOLUME: 67 ML
LV EF US.2D.A4C+ESTIMATED: 52 %
LVSV (TEICH): 67 ML
MCH RBC QN AUTO: 29.5 PG (ref 26.8–34.3)
MCHC RBC AUTO-ENTMCNC: 32.6 G/DL (ref 31.4–37.4)
MCV RBC AUTO: 91 FL (ref 82–98)
MV E'TISSUE VEL-LAT: 9 CM/S
MV E'TISSUE VEL-SEP: 8 CM/S
MV PEAK A VEL: 0.66 M/S
MV PEAK E VEL: 67 CM/S
MV STENOSIS PRESSURE HALF TIME: 33 MS
MV VALVE AREA P 1/2 METHOD: 6.67
P AXIS: 55 DEGREES
PLATELET # BLD AUTO: 399 THOUSANDS/UL (ref 149–390)
PMV BLD AUTO: 9.3 FL (ref 8.9–12.7)
POTASSIUM SERPL-SCNC: 4.1 MMOL/L (ref 3.5–5.3)
PR INTERVAL: 138 MS
QRS AXIS: 67 DEGREES
QRSD INTERVAL: 90 MS
QT INTERVAL: 372 MS
QTC INTERVAL: 451 MS
RA PRESSURE ESTIMATED: 3 MMHG
RBC # BLD AUTO: 3.05 MILLION/UL (ref 3.88–5.62)
RIGHT ATRIUM AREA SYSTOLE A4C: 18 CM2
RIGHT VENTRICLE ID DIMENSION: 3.2 CM
RV PSP: 38 MMHG
SL CV LEFT ATRIUM LENGTH A2C: 4.7 CM
SL CV LV EF: 50
SL CV PED ECHO LEFT VENTRICLE DIASTOLIC VOLUME (MOD BIPLANE) 2D: 133 ML
SL CV PED ECHO LEFT VENTRICLE SYSTOLIC VOLUME (MOD BIPLANE) 2D: 66 ML
SODIUM SERPL-SCNC: 134 MMOL/L (ref 135–147)
T WAVE AXIS: -2 DEGREES
TR MAX PG: 35 MMHG
TR PEAK VELOCITY: 2.9 M/S
TRICUSPID ANNULAR PLANE SYSTOLIC EXCURSION: 2.2 CM
TRICUSPID VALVE PEAK REGURGITATION VELOCITY: 2.94 M/S
UNIT DISPENSE STATUS: NORMAL
UNIT PRODUCT CODE: NORMAL
UNIT PRODUCT VOLUME: 250 ML
UNIT RH: NORMAL
VENTRICULAR RATE: 88 BPM
WBC # BLD AUTO: 11.33 THOUSAND/UL (ref 4.31–10.16)

## 2025-05-19 PROCEDURE — 97530 THERAPEUTIC ACTIVITIES: CPT

## 2025-05-19 PROCEDURE — 93306 TTE W/DOPPLER COMPLETE: CPT

## 2025-05-19 PROCEDURE — 85027 COMPLETE CBC AUTOMATED: CPT | Performed by: INTERNAL MEDICINE

## 2025-05-19 PROCEDURE — 97168 OT RE-EVAL EST PLAN CARE: CPT

## 2025-05-19 PROCEDURE — 99024 POSTOP FOLLOW-UP VISIT: CPT | Performed by: PODIATRIST

## 2025-05-19 PROCEDURE — 84484 ASSAY OF TROPONIN QUANT: CPT | Performed by: INTERNAL MEDICINE

## 2025-05-19 PROCEDURE — 93005 ELECTROCARDIOGRAM TRACING: CPT

## 2025-05-19 PROCEDURE — 93010 ELECTROCARDIOGRAM REPORT: CPT | Performed by: STUDENT IN AN ORGANIZED HEALTH CARE EDUCATION/TRAINING PROGRAM

## 2025-05-19 PROCEDURE — 99233 SBSQ HOSP IP/OBS HIGH 50: CPT | Performed by: INTERNAL MEDICINE

## 2025-05-19 PROCEDURE — 99223 1ST HOSP IP/OBS HIGH 75: CPT

## 2025-05-19 PROCEDURE — 97164 PT RE-EVAL EST PLAN CARE: CPT

## 2025-05-19 PROCEDURE — 82948 REAGENT STRIP/BLOOD GLUCOSE: CPT

## 2025-05-19 PROCEDURE — 84484 ASSAY OF TROPONIN QUANT: CPT

## 2025-05-19 PROCEDURE — 83880 ASSAY OF NATRIURETIC PEPTIDE: CPT

## 2025-05-19 PROCEDURE — 80048 BASIC METABOLIC PNL TOTAL CA: CPT | Performed by: INTERNAL MEDICINE

## 2025-05-19 PROCEDURE — 99024 POSTOP FOLLOW-UP VISIT: CPT

## 2025-05-19 RX ORDER — POLYETHYLENE GLYCOL 3350 17 G/17G
17 POWDER, FOR SOLUTION ORAL DAILY PRN
Status: DISCONTINUED | OUTPATIENT
Start: 2025-05-19 | End: 2025-05-28 | Stop reason: HOSPADM

## 2025-05-19 RX ORDER — FUROSEMIDE 10 MG/ML
40 INJECTION INTRAMUSCULAR; INTRAVENOUS
Status: DISCONTINUED | OUTPATIENT
Start: 2025-05-19 | End: 2025-05-21

## 2025-05-19 RX ORDER — DOCUSATE SODIUM 100 MG/1
100 CAPSULE, LIQUID FILLED ORAL 2 TIMES DAILY
Status: DISCONTINUED | OUTPATIENT
Start: 2025-05-19 | End: 2025-05-28 | Stop reason: HOSPADM

## 2025-05-19 RX ADMIN — OXYCODONE HYDROCHLORIDE 10 MG: 10 TABLET ORAL at 12:53

## 2025-05-19 RX ADMIN — INSULIN LISPRO 1 UNITS: 100 INJECTION, SOLUTION INTRAVENOUS; SUBCUTANEOUS at 17:14

## 2025-05-19 RX ADMIN — ACETAMINOPHEN 975 MG: 325 TABLET, FILM COATED ORAL at 05:34

## 2025-05-19 RX ADMIN — CLOPIDOGREL BISULFATE 75 MG: 75 TABLET, FILM COATED ORAL at 08:44

## 2025-05-19 RX ADMIN — APIXABAN 5 MG: 5 TABLET, FILM COATED ORAL at 08:44

## 2025-05-19 RX ADMIN — ACETAMINOPHEN 975 MG: 325 TABLET, FILM COATED ORAL at 14:54

## 2025-05-19 RX ADMIN — GLIPIZIDE 10 MG: 5 TABLET, FILM COATED, EXTENDED RELEASE ORAL at 17:15

## 2025-05-19 RX ADMIN — APIXABAN 5 MG: 5 TABLET, FILM COATED ORAL at 17:15

## 2025-05-19 RX ADMIN — MONTELUKAST 10 MG: 10 TABLET, FILM COATED ORAL at 21:58

## 2025-05-19 RX ADMIN — OXYCODONE HYDROCHLORIDE 10 MG: 10 TABLET ORAL at 17:15

## 2025-05-19 RX ADMIN — OXYCODONE HYDROCHLORIDE 10 MG: 10 TABLET ORAL at 21:59

## 2025-05-19 RX ADMIN — ATORVASTATIN CALCIUM 40 MG: 40 TABLET, FILM COATED ORAL at 17:15

## 2025-05-19 RX ADMIN — BUDESONIDE, GLYCOPYRROLATE, AND FORMOTEROL FUMARATE 2 PUFF: 160; 9; 4.8 AEROSOL, METERED RESPIRATORY (INHALATION) at 22:00

## 2025-05-19 RX ADMIN — GLIPIZIDE 10 MG: 5 TABLET, FILM COATED, EXTENDED RELEASE ORAL at 05:35

## 2025-05-19 RX ADMIN — CEFAZOLIN SODIUM 2000 MG: 2 SOLUTION INTRAVENOUS at 22:01

## 2025-05-19 RX ADMIN — ACETAMINOPHEN 975 MG: 325 TABLET, FILM COATED ORAL at 21:58

## 2025-05-19 RX ADMIN — CEFAZOLIN SODIUM 2000 MG: 2 SOLUTION INTRAVENOUS at 14:54

## 2025-05-19 RX ADMIN — INSULIN LISPRO 2 UNITS: 100 INJECTION, SOLUTION INTRAVENOUS; SUBCUTANEOUS at 12:40

## 2025-05-19 RX ADMIN — OXYCODONE HYDROCHLORIDE 10 MG: 10 TABLET ORAL at 08:44

## 2025-05-19 RX ADMIN — ATENOLOL 25 MG: 25 TABLET ORAL at 08:42

## 2025-05-19 RX ADMIN — CEFAZOLIN SODIUM 2000 MG: 2 SOLUTION INTRAVENOUS at 05:35

## 2025-05-19 RX ADMIN — METRONIDAZOLE 500 MG: 500 TABLET ORAL at 08:45

## 2025-05-19 RX ADMIN — AMLODIPINE BESYLATE 5 MG: 5 TABLET ORAL at 08:44

## 2025-05-19 RX ADMIN — LISINOPRIL 20 MG: 20 TABLET ORAL at 08:43

## 2025-05-19 RX ADMIN — Medication 1 TABLET: at 08:45

## 2025-05-19 RX ADMIN — BUDESONIDE, GLYCOPYRROLATE, AND FORMOTEROL FUMARATE 2 PUFF: 160; 9; 4.8 AEROSOL, METERED RESPIRATORY (INHALATION) at 08:42

## 2025-05-19 RX ADMIN — METRONIDAZOLE 500 MG: 500 TABLET ORAL at 21:58

## 2025-05-19 NOTE — ASSESSMENT & PLAN NOTE
- BP overall stable during admission   - home rx: amlodipine 5 mg daily, lisinopril 20 mg daily, atenolol 25 mg daily

## 2025-05-19 NOTE — ASSESSMENT & PLAN NOTE
Patient developed acute onset of dyspnea evening of 5/18 after blood transfusion  EKG with transient lateral ST depression  Troponins elevated 163-> 155-> 193-> 237  1/25 nuclear stress test: Ejection fraction 65%.  No evidence of ischemia  Continue medical therapy with Plavix, statin  Will consult cardiology

## 2025-05-19 NOTE — ASSESSMENT & PLAN NOTE
- met SIRS criteria at [facility] with 05/07/25 with tachycardia and leukocytosis   - 5/16: Underwent right partial fifth ray resection: presumed surgical cure  - s/p right CFA SFA endarterectomy/stenting 5/13/2025   - currently on Plavix 75 mg daily (podiatry and vascular following)

## 2025-05-19 NOTE — PLAN OF CARE
Problem: PHYSICAL THERAPY ADULT  Goal: Performs mobility at highest level of function for planned discharge setting.  See evaluation for individualized goals.  Description: Treatment/Interventions: Functional transfer training, LE strengthening/ROM, Elevations, Therapeutic exercise, Endurance training, Patient/family training, Bed mobility, Gait training, Spoke to nursing, OT  Equipment Recommended: Walker (pt has)       See flowsheet documentation for full assessment, interventions and recommendations.  Note: Prognosis: Fair  Problem List: Decreased strength, Decreased endurance, Impaired balance, Decreased mobility, Decreased cognition, Impaired judgement, Decreased safety awareness, Decreased skin integrity, Pain (NWB RLE)  Assessment: Pt seen for PT re-eval due to s/p  R partial 5th ray resection. Pt currently need mod to maxAx2 for sit<->stand transfers, maxAX2 for stand pivot transfer. Pt may benefit from use of sliding board for transfers at this time due to pt has dec ability to maintain NWB RLE and inc posterior lean during static stand and transfers. Pt fatigues easily with weakness BUE and LLE. Pt would cont to benefit from skilled inpt PT services to maximize functional independence and to dec caregiver burden upon being DC from the hospital.  Barriers to Discharge: None  Barriers to Discharge Comments: at current LOF/wbs  Rehab Resource Intensity Level, PT: I (Maximum Resource Intensity)    See flowsheet documentation for full assessment.

## 2025-05-19 NOTE — ASSESSMENT & PLAN NOTE
Most recent echocardiogram 10/24: Left ventricular ejection fraction 50%.  Grade 1 diastolic dysfunction.  Mild hypokinesis of the posterior wall  Chest x-ray: Moderate interstitial edema  Patient was placed on diuresis with IV Lasix  Most likely volume overload secondary to blood transfusions, IV fluids/antibiotics

## 2025-05-19 NOTE — ASSESSMENT & PLAN NOTE
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

## 2025-05-19 NOTE — PHYSICAL THERAPY NOTE
"   Physical Therapy Treatment Session:       05/19/25 0940   PT Last Visit   PT Visit Date 05/19/25   Note Type   Note Type   (PT re-evaluation)   Pain Assessment   Pain Assessment Tool 0-10   Pain Score 7   Pain Location/Orientation Orientation: Right;Location: Leg;Location: Foot   Hospital Pain Intervention(s) Repositioned;Ambulation/increased activity;Elevated;Emotional support;Rest   Restrictions/Precautions   Weight Bearing Precautions Per Order Yes   RLE Weight Bearing Per Order NWB   Other Precautions Pain;Fall Risk;Telemetry;Multiple lines;Limb alert;WBS;Bed Alarm;Chair Alarm;Cognitive   General   Chart Reviewed Yes   Additional Pertinent History s/p R partial 5th ray resection performed 05/16/25   Family/Caregiver Present Yes  (pts wife)   Cognition   Overall Cognitive Status Impaired   Arousal/Participation Cooperative;Arousable;Responsive   Orientation Level Oriented to person;Oriented to place;Oriented to situation   Following Commands Follows one step commands with increased time or repetition   Comments dec BLE coordination, dec ability to perform multitasks   Subjective   Subjective pt supine in bed resting comfortably with wife present;pt willing and agreeable to work with PT and to participate in therapy intervention. \"I can try, it's been awhile since I've been out of bed\".   Bed Mobility   Rolling R 4  Minimal assistance   Additional items Assist x 1;HOB elevated;Bedrails;Increased time required;Verbal cues;LE management   Supine to Sit 4  Minimal assistance   Additional items Assist x 1;HOB elevated;Bedrails;Increased time required;Verbal cues;LE management   Transfers   Sit to Stand 2  Maximal assistance  (first sit<->stand transfer modAX2 from raised bed; second sit<->stand transfer maxAX2;inc posterior lean and dec ability to maintain NWB RLE)   Additional items Assist x 2;HOB elevated;Bedrails;Increased time required;Verbal cues   Stand to Sit 2  Maximal assistance   Additional items Assist x " 2;Armrests;Increased time required;Verbal cues   Stand pivot 2  Maximal assistance   Additional items Assist x 2;Increased time required;Verbal cues  (B HHA with bear hug. Pt may benefit from use of sliding board transfers to perform OOB)   Additional Comments pt performed x2 sit<->stand transfers with 1st attempt needing modAX2 from raised bed and 2nd attempt needing maxAx2 from raised bed;pt fatigues easily and dec ability to maintain NWB RLE in static stand   Ambulation/Elevation   Gait Assistance Not tested   Balance   Static Sitting Fair   Dynamic Sitting   (zero)   Static Standing Zero   Endurance Deficit   Endurance Deficit Yes   Endurance Deficit Description pain, recent surgical procedure, dec activity tolerance, fatigues easily   Activity Tolerance   Activity Tolerance Patient limited by pain;Patient limited by fatigue  (poor->fair)   Medical Staff Made Aware OTR Carlita   Nurse Made Aware yes   Assessment   Prognosis Fair   Problem List Decreased strength;Decreased endurance;Impaired balance;Decreased mobility;Decreased cognition;Impaired judgement;Decreased safety awareness;Decreased skin integrity;Pain  (NWB RLE)   Assessment Pt seen for PT re-eval due to s/p  R partial 5th ray resection. Pt currently need mod to maxAx2 for sit<->stand transfers, maxAX2 for stand pivot transfer. Pt may benefit from use of sliding board for transfers at this time due to pt has dec ability to maintain NWB RLE and inc posterior lean during static stand and transfers. Pt fatigues easily with weakness BUE and LLE. Pt would cont to benefit from skilled inpt PT services to maximize functional independence and to dec caregiver burden upon being DC from the hospital.   Goals   Patient Goals to get stronger   STG Expiration Date 05/29/25   Short Term Goal #1 in 7-10 days:  (1) activity tolerance:45 mins/45mins, (3) pt will be able to perform sit to stand transfers, stand pivot transfers and sliding board transfers needing modAx1  to and from various surfaces while maintaining NWB % of the time consistently in order to return to PLOF, (4) pt will be able to perform BM needing S level of A to A pt to return to PLOF, (5) (I) with BLE therapeutic ex HEP in various positions to A pt to inc balance,strength,mobility,endurance and to A to dec pain, (6) inc balance 1/2 grade in order to dec fall risk, (7) pt will be able to perform wc mobility greater than 100 feet on various surfaces needing min Ax1 and wc management needing minAX1, (8) cont to provide pt and pt family education for safe D/C planning, (9) inc BLE strength 1/2 to 1 full grade in order to A pt to inc balance,strength,mobility,endurance and to A to dec pain   PT Treatment Day 2   Plan   Treatment/Interventions Functional transfer training;LE strengthening/ROM;Therapeutic exercise;Endurance training;Patient/family training;Equipment eval/education;Bed mobility;Continued evaluation;Spoke to nursing;OT;Family  (wc mobility and mangement)   Progress Slow progress, decreased activity tolerance   PT Frequency 3-5x/wk   Discharge Recommendation   Rehab Resource Intensity Level, PT I (Maximum Resource Intensity)   Equipment Recommended Wheelchair;Walker   AM-PAC Basic Mobility Inpatient   Turning in Flat Bed Without Bedrails 2   Lying on Back to Sitting on Edge of Flat Bed Without Bedrails 2   Moving Bed to Chair 2   Standing Up From Chair Using Arms 2   Walk in Room 1   Climb 3-5 Stairs With Railing 1   Basic Mobility Inpatient Raw Score 10   [facility] Highest Level Of Mobility   -M Goal 4: Move to chair/commode   -[facility] Achieved 4: Move to chair/commode

## 2025-05-19 NOTE — PLAN OF CARE
Problem: PAIN - ADULT  Goal: Verbalizes/displays adequate comfort level or baseline comfort level  Description: Interventions:- Encourage patient to monitor pain and request assistance- Assess pain using appropriate pain scale- Administer analgesics based on type and severity of pain and evaluate response- Implement non-pharmacological measures as appropriate and evaluate response- Consider cultural and social influences on pain and pain management- Notify physician/advanced practitioner if interventions unsuccessful or patient reports new pain  Outcome: Progressing     Problem: DISCHARGE PLANNING  Goal: Discharge to home or other facility with appropriate resources  Description: INTERVENTIONS:- Identify barriers to discharge w/patient and caregiver- Arrange for needed discharge resources and transportation as appropriate- Identify discharge learning needs (meds, wound care, etc.)- Arrange for interpretive services to assist at discharge as needed- Refer to Case Management Department for coordinating discharge planning if the patient needs post-hospital services based on physician/advanced practitioner order or complex needs related to functional status, cognitive ability, or social support system  Outcome: Progressing     Problem: SKIN/TISSUE INTEGRITY - ADULT  Goal: Skin Integrity remains intact(Skin Breakdown Prevention)  Description: Assess:-Perform Sae assessment -Clean and moisturize skin -Inspect skin when repositioning, toileting, and assisting with ADLS-Assess under medical devices -Assess extremities for adequate circulation and sensation Bed Management:-Have minimal linens on bed & keep smooth, unwrinkled-Change linens as needed when moist or perspiring-Avoid sitting or lying in one position for more than 2 hours while in bed-Keep HOB at 30 degrees Toileting:-Offer bedside commode-Assess for incontinence -Use incontinent care products after each incontinent episode Activity:-Mobilize patient 3 times  a day-Encourage activity and walks on unit-Encourage or provide ROM exercises -Turn and reposition patient every 2 Hours-Use appropriate equipment to lift or move patient in bed-Instruct/ Assist with weight shifting  when out of bed in chair-Consider limitation of chair time 2 hour intervalsSkin Care:-Avoid use of baby powder, tape, friction and shearing, hot water or constrictive clothing-Relieve pressure over bony prominences Do not massage red bony areasNext Steps:-Teach patient strategies to minimize risks Consider consults to  interdisciplinary teams   Outcome: Progressing  Goal: Incision(s), wounds(s) or drain site(s) healing without S/S of infection  Description: INTERVENTIONS- Assess and document dressing, incision, wound bed, drain sites and surrounding tissue- Provide patient and family education- Perform skin care/dressing changes every shift  Outcome: Progressing  Goal: Pressure injury heals and does not worsen  Description: Interventions:- Implement low air loss mattress or specialty surface (Criteria met)- Apply silicone foam dressing- Instruct/assist with weight shifting every 120 minutes when in chair - Limit chair time to 2 hour intervals- Use special pressure reducing interventions such as turning when in chair - Apply fecal or urinary incontinence containment device - Perform passive or active ROM every 2 hours- Turn and reposition patient & offload bony prominences every 2 hours - Utilize friction reducing device or surface for transfers - Consider consults to  interdisciplinary teams such as wound- Use incontinent care products after each incontinent episode such as wipes- Consider nutrition services referral as needed  Outcome: Progressing     Problem: Nutrition/Hydration-ADULT  Goal: Nutrient/Hydration intake appropriate for improving, restoring or maintaining nutritional needs  Description: Monitor and assess patient's nutrition/hydration status for malnutrition. Collaborate with  interdisciplinary team and initiate plan and interventions as ordered.  Monitor patient's weight and dietary intake as ordered or per policy. Utilize nutrition screening tool and intervene as necessary. Determine patient's food preferences and provide high-protein, high-caloric foods as appropriate. INTERVENTIONS:- Monitor oral intake, urinary output, labs, and treatment plans- Assess nutrition and hydration status and recommend course of action- Evaluate amount of meals eaten- Assist patient with eating if necessary - Allow adequate time for meals- Recommend/ encourage appropriate diets, oral nutritional supplements, and vitamin/mineral supplements- Order, calculate, and assess calorie counts as needed- Recommend, monitor, and adjust tube feedings and TPN/PPN based on assessed needs- Assess need for intravenous fluids- Provide specific nutrition/hydration education as appropriate- Include patient/family/caregiver in decisions related to nutrition  Outcome: Progressing     Problem: Prexisting or High Potential for Compromised Skin Integrity  Goal: Skin integrity is maintained or improved  Description: INTERVENTIONS:  - Identify patients at risk for skin breakdown  - Assess and monitor skin integrity  - Assess and monitor nutrition and hydration status  - Monitor labs   - Assess for incontinence   - Turn and reposition patient  - Assist with mobility/ambulation  - Relieve pressure over bony prominences  - Avoid friction and shearing  - Provide appropriate hygiene as needed including keeping skin clean and dry  - Evaluate need for skin moisturizer/barrier cream  - Collaborate with interdisciplinary team   - Patient/family teaching  - Consider wound care consult   Outcome: Progressing     Problem: INFECTION - ADULT  Goal: Absence or prevention of progression during hospitalization  Description: INTERVENTIONS:  - Assess and monitor for signs and symptoms of infection  - Monitor lab/diagnostic results  - Monitor all  insertion sites, i.e. indwelling lines, tubes, and drains  - Monitor endotracheal if appropriate and nasal secretions for changes in amount and color  - [place] appropriate cooling/warming therapies per order  - Administer medications as ordered  - Instruct and encourage patient and family to use good hand hygiene technique  - Identify and instruct in appropriate isolation precautions for identified infection/condition  Outcome: Progressing  Goal: Absence of fever/infection during neutropenic period  Description: INTERVENTIONS:  - Monitor WBC    Outcome: Progressing     Problem: SAFETY ADULT  Goal: Patient will remain free of falls  Description: INTERVENTIONS:  - Educate patient/family on patient safety including physical limitations  - Instruct patient to call for assistance with activity   - Consult OT/PT to assist with strengthening/mobility   - Keep Call bell within reach  - Keep bed low and locked with side rails adjusted as appropriate  - Keep care items and personal belongings within reach  - Initiate and maintain comfort rounds  - Make Fall Risk Sign visible to staff  - Offer Toileting every 2 Hours, in advance of need  - Initiate/Maintain bed alarm  - Obtain necessary fall risk management equipment:   - Apply yellow socks and bracelet for high fall risk patients  - Consider moving patient to room near nurses station  Outcome: Progressing  Goal: Maintain or return to baseline ADL function  Description: INTERVENTIONS:  -  Assess patient's ability to carry out ADLs; assess patient's baseline for ADL function and identify physical deficits which impact ability to perform ADLs (bathing, care of mouth/teeth, toileting, grooming, dressing, etc.)  - Assess/evaluate cause of self-care deficits   - Assess range of motion  - Assess patient's mobility; develop plan if impaired  - Assess patient's need for assistive devices and provide as appropriate  - Encourage maximum independence but intervene and supervise when  necessary  - Involve family in performance of ADLs  - Assess for home care needs following discharge   - Consider OT consult to assist with ADL evaluation and planning for discharge  - Provide patient education as appropriate  Outcome: Progressing  Goal: Maintains/Returns to pre admission functional level  Description: INTERVENTIONS:  - Perform AM-PAC 6 Click Basic Mobility/ Daily Activity assessment daily.  - Set and communicate daily mobility goal to care team and patient/family/caregiver.   - Collaborate with rehabilitation services on mobility goals if consulted  - Perform Range of Motion 4 times a day.  - Reposition patient every 3 hours.  - Dangle patient 3 times a day  - Stand patient 3 times a day  - Ambulate patient 3 times a day  - Out of bed to chair 3 times a day   - Out of bed for meals 3 times a day  - Out of bed for toileting  - Record patient progress and toleration of activity level   Outcome: Progressing     Problem: Knowledge Deficit  Goal: Patient/family/caregiver demonstrates understanding of disease process, treatment plan, medications, and discharge instructions  Description: Complete learning assessment and assess knowledge base.  Interventions:  - Provide teaching at level of understanding  - Provide teaching via preferred learning methods  Outcome: Progressing     Problem: METABOLIC, FLUID AND ELECTROLYTES - ADULT  Goal: Electrolytes maintained within normal limits  Description: INTERVENTIONS:  - Monitor labs and assess patient for signs and symptoms of electrolyte imbalances  - Administer electrolyte replacement as ordered  - Monitor response to electrolyte replacements, including repeat lab results as appropriate  - Instruct patient on fluid and nutrition as appropriate  Outcome: Progressing     Problem: HEMATOLOGIC - ADULT  Goal: Maintains hematologic stability  Description: INTERVENTIONS  - Assess for signs and symptoms of bleeding or hemorrhage  - Monitor labs  - Administer supportive  blood products/factors as ordered and appropriate  Outcome: Progressing     Problem: CARDIOVASCULAR - ADULT  Goal: Maintains optimal cardiac output and hemodynamic stability  Description: INTERVENTIONS:  - Monitor I/O, vital signs and rhythm  - Monitor for S/S and trends of decreased cardiac output  - Administer and titrate ordered vasoactive medications to optimize hemodynamic stability  - Assess quality of pulses, skin color and temperature  - Assess for signs of decreased coronary artery perfusion  - Instruct patient to report change in severity of symptoms  Outcome: Progressing  Goal: Absence of cardiac dysrhythmias or at baseline rhythm  Description: INTERVENTIONS:  - Continuous cardiac monitoring, vital signs, obtain 12 lead EKG if ordered  - Administer antiarrhythmic and heart rate control medications as ordered  - Monitor electrolytes and administer replacement therapy as ordered  Outcome: Progressing

## 2025-05-19 NOTE — PROGRESS NOTES
Progress Note - Hospitalist   Name: Gold Van 79 y.o. 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  No evidence of ischemia  Continue medical therapy with Plavix, statin  Will consult cardiology  Volume overload  Most recent echocardiogram 10/24: Left ventricular ejection fraction 50%.  Grade 1 diastolic dysfunction.  Mild hypokinesis of the posterior wall  Chest x-ray: Moderate interstitial edema  Patient was placed on diuresis with IV Lasix  Most likely volume overload secondary to blood transfusions, IV fluids/antibiotics  Severe peripheral arterial disease (HCC)  Transferred here for vascular surgery intervention  Underwent right CFA SFA endarterectomy/stenting 5/13/2025  Aspirin discontinued.  Started on clopidogrel for stents  Continue statin  Patient is also on Eliquis for paroxysmal atrial fibrillation  Osteomyelitis (HCC)  Right fifth metatarsal ulceration with underlying osteomyelitis and surrounding right foot cellulitis  Treated with cefazolin/Flagyl  5/16: Underwent right partial fifth ray  resection: presumed surgical cure  Wound care/VAC per podiatry: Removed 5/17 secondary to bleeding  Anticipate cont abx for 5 days post op (anticipate transition to po augmentin at NH, depending on sensitivities)  Postoperative anemia  Prior baseline hemoglobin appears to range 10-12  Postprocedure hemoglobin decreased to 7.0 when patient was symptomatic: Secondary to expected procedural blood loss  On 5/18 transfused 1 unit PRBC   Repeat hemoglobin 9.0  Hemoccult all stools    Results from last 7 days   Lab Units 05/19/25  0548 05/18/25  0440 05/17/25  1751 05/17/25  0542   HEMOGLOBIN g/dL 9.0* 7.0* 7.7* 7.2*   MCV fL 91 94  --  94     Type 2 diabetes mellitus with complication, without long-term current use of insulin ([facility])  Lab Results   Component Value Date    HGBA1C 9.3 (H) 04/09/2025     Recent Labs     05/18/25  1126 05/18/25  1604 05/18/25  2109 05/19/25  0730   POCGLU 185* 227* 182* 90     Prior to admission meds: glipizide, linagliptin and metformin  A1c 9.3.  Endocrinology consulted by vascular surgery  Continue metformin.  Restarted glipizide and did have hypoglycemia this morning.  Discussed need to hold evening glipizide if oral intake is suboptimal  PAF (paroxysmal atrial fibrillation) ([facility])  Follows with [facility] cardiology: Most recent note 4/25 was reviewed  Currently in sinus rhythm  Continue atenolol 25 mg daily.  Anticoagulation: Apixaban was on hold with heparin infusion.  Apixaban has been restarted for paroxysmal atrial fibrillation in the setting of previous stroke  Essential hypertension  Continue amlodipine atenolol 25 mg daily and lisinopril 20 mg twice daily  Mild intermittent asthma without complication  [facility]  No exacerbation  Albuterol metered-dose inhaler as needed  Pruritus  Chronic pruritus follows with dermatology as an outpatient on prednisone 5 mg daily  Cardiomyopathy (HCC)  Last known LVEF 50% echocardiogram 2024  Currently compensated.  CO'ed IV  "fluids  Lung nodule  Chest x-ray with incidental finding of \"nodular opacity at left base\"  Outpatient chest x-ray in 6 weeks    VTE Pharmacologic Prophylaxis: VTE Score: 5 High Risk (Score >/= 5) - Pharmacological DVT Prophylaxis Ordered: apixaban (Eliquis). Sequential Compression Devices Ordered.    Mobility:   Basic Mobility Inpatient Raw Score: 17  JH-HLM Goal: 5: Stand one or more mins  JH-HLM Achieved: 5: Stand (1 or more minutes)  JH-HLM Goal achieved. Continue to encourage appropriate mobility.    Patient Centered Rounds: I performed bedside rounds with nursing staff today.   Discussions with Specialists or Other Care Team Provider: MELISSA.  Communicated consult to cardiology on call    Education and Discussions with Family / Patient: Updated patient and his wife at the bedside.  Reviewed test results, treatment plan.  Answered all questions    Current Length of Stay: 8 day(s)  Current Patient Status: Inpatient   Certification Statement: The patient will continue to require additional inpatient hospital stay due to postoperative care, cardiology evaluation, positive troponin  Discharge Plan: Anticipate discharge in >72 hrs to rehab facility.    Code Status: Level 1 - Full Code    Subjective   Patient notes he feels better.  Denies any current chest pain chest tightness or chest discomfort.  Denies any shortness of breath.  Notes last evening he had shortness of breath which since resolved.  Relates he was passing copious amounts of urine all night.  He declined Lasix this morning due to copious urination.  Patient denies any sensation of asthma exacerbation.  Denies any abdominal pain, nausea, vomiting, diarrhea or constipation.  Is tolerating p.o.  Notes he has pain in his foot currently 7/10 intensity.  He states his pain medication had been controlling his pain well.  He is about to take his morning pain pill.  He denies any dizziness or lightheadedness.    Objective :  Temp:  [98.1 °F (36.7 °C)-98.9 °F (37.2 " °C)] 98.2 °F (36.8 °C)  HR:  [] 106  BP: (107-139)/(61-73) 130/69  Resp:  [14-18] 16  SpO2:  [90 %-97 %] 91 %  O2 Device: Nasal cannula  Nasal Cannula O2 Flow Rate (L/min):  [2 L/min] 2 L/min    Body mass index is 23.35 kg/m².     Input and Output Summary (last 24 hours):     Intake/Output Summary (Last 24 hours) at 5/19/2025 0851  Last data filed at 5/19/2025 0601  Gross per 24 hour   Intake 617.5 ml   Output 2320 ml   Net -1702.5 ml       Physical Exam  General: Very pleasant male.  No acute distress.  Nontachypneic and nondyspneic  Heart: Regular rate and rhythm.  S1-S2 present.  No murmur, rub, gallop  Lungs: Clear to auscultation bilaterally.  Decreased breath sounds at both bases however otherwise good air movement.  No wheezes, crackles, rhonchi.  No accessory muscle use or respiratory distress  Abdomen: Soft, nontender with palpation.  Nondistended.  Normoactive bowel sounds present.  No guarding or rebound.  No peritoneal signs or mass  Extremities: Right foot dressing and Ace wrap in place  Neurologic: Awake.  Alert.  Oriented.  Fluent and goal-directed speech.  Interactive  Skin: Right foot dressing and Ace wrap and    Lines/Drains:        Telemetry:  Telemetry Orders (From admission, onward)               24 Hour Telemetry Monitoring  Continuous x 24 Hours (Telem)        Expiring   Question:  Reason for 24 Hour Telemetry  Answer:  Arrhythmias requiring acute medical intervention / PPM or ICD malfunction                     Telemetry Reviewed: Normal Sinus Rhythm  Indication for Continued Telemetry Use: Acute MI/Unstable Angina/Rule out ACS               Lab Results: I have reviewed the following results:   Results from last 7 days   Lab Units 05/19/25  0548   WBC Thousand/uL 11.33*   HEMOGLOBIN g/dL 9.0*   HEMATOCRIT % 27.6*   PLATELETS Thousands/uL 399*     Results from last 7 days   Lab Units 05/19/25  0548 05/17/25  0542 05/16/25  0545   SODIUM mmol/L 134*   < > 134*   POTASSIUM mmol/L 4.1   < >  4.0   CHLORIDE mmol/L 99   < > 103   CO2 mmol/L 28   < > 25   BUN mg/dL 9   < > 14   CREATININE mg/dL 0.98   < > 0.87   ANION GAP mmol/L 7   < > 6   CALCIUM mg/dL 8.7   < > 8.1*   ALBUMIN g/dL  --   --  2.6*   TOTAL BILIRUBIN mg/dL  --   --  0.30   ALK PHOS U/L  --   --  51   ALT U/L  --   --  4*   AST U/L  --   --  12*   GLUCOSE RANDOM mg/dL 89   < > 167*    < > = values in this interval not displayed.         Results from last 7 days   Lab Units 25  0730 25  2109 25  1604 25  1126 25  0734 25  0658 25  2105 25  1739 25  1607 25  1155 25  0728 25  2112   POC GLUCOSE mg/dl 90 182* 227* 185* 77 55* 158* 206* 188* 142* 98 223*               Recent Cultures (last 7 days):   Results from last 7 days   Lab Units 25  1337   GRAM STAIN RESULT  Rare Gram positive cocci in pairs*  No polys seen*   WOUND CULTURE  3+ Growth of Serratia marcescens*  3+ Growth of Enterococcus faecalis*       ============================================  Imagin/18 chest x-ray  moderate interstitial edema.  Nodular opacity at the left base, not visible on prior studies. Recommend follow-up with a chest radiograph with dual energy subtraction in 6 weeks to assure resolution     right foot x-rayPostop right foot.  No acute osseous abnormality.     vein mapping  RIGHT LOWER LIMB:   The great saphenous vein is patent  from the groin to the ankle.   The intraluminal diameter measurements range from 2.2 mm to 12.6 mm throughout.   LEFT LOWER LIMB:   The great saphenous vein is patent  from the groin to the ankle.   The intraluminal diameter measurements range from 2.1 mm to 9 mm throughout.      right foot x-ray: Ulceration along the lateral aspect of the foot and soft tissue swelling along the dorsum of the foot without radiographic findings of osteomyelitis.      venous duplex  RIGHT LOWER LIMB   No evidence of acute or chronic deep vein thrombosis.    No evidence of superficial thrombophlebitis noted.   Doppler evaluation shows a normal response to augmentation maneuvers.   Popliteal, posterior tibial and anterior tibial arterial Doppler waveforms are monophasic (Known PAD).   Note: There is a well-defined hypoechoic non-vascularized cystic-type structure noted in the popliteal fossa.      LEFT LOWER LIMB LIMITED   Evaluation shows no evidence of thrombus in the common femoral vein.    Doppler evaluation shows a normal response to augmentation maneuvers.    5/6 CTA abdomen with runoff  1. Focal high-grade stenosis of distal right CFA and diffuse atherosclerotic disease of right SFA resulting in moderate to severe stenoses with focal near complete occlusions at the proximal and distal segments.  2. Short segment occlusions of proximal right anterior tibial and peroneal arteries with reconstitution in the medial to distal segments. Right posterior tibial artery is occluded.  3. Occluded left SFA with reconstitution in the distal segment. Left anterior tibial and posterior tibial arteries are occluded. One-vessel runoff of the left lower extremity through the peroneal artery.  4. Focal high-grade stenosis at the proximal right renal artery.      Microbiology  5/16 anaerobic culture: Negative  5/16 wound culture: 3+ Serratia.  3+ Enterococcus faecalis  5/7 blood culture: Negative x 2      =============================================    Last 24 Hours Medication List:     Current Facility-Administered Medications:     acetaminophen (TYLENOL) tablet 975 mg, Q8H SANDRA    albuterol (PROVENTIL HFA,VENTOLIN HFA) inhaler 1 puff, Q4H PRN    amLODIPine (NORVASC) tablet 5 mg, BID    apixaban (ELIQUIS) tablet 5 mg, BID    atenolol (TENORMIN) tablet 25 mg, Daily    atorvastatin (LIPITOR) tablet 40 mg, QPM    Budeson-Glycopyrrol-Formoterol 160-9-4.8 MCG/ACT AERO 2 puff, BID    ceFAZolin (ANCEF) IVPB (premix in dextrose) 2,000 mg 50 mL, Q8H, Last Rate: 2,000 mg (05/19/25 0535)     clopidogrel (PLAVIX) tablet 75 mg, Daily    [Held by provider] famotidine (PEPCID) tablet 20 mg, BID    furosemide (LASIX) injection 40 mg, BID (diuretic)    glipiZIDE (GLUCOTROL XL) 24 hr tablet 10 mg, BID AC    insulin lispro (HumALOG/ADMELOG) 100 units/mL subcutaneous injection 1-5 Units, TID AC **AND** Fingerstick Glucose (POCT), TID AC    insulin lispro (HumALOG/ADMELOG) 100 units/mL subcutaneous injection 1-5 Units, HS    levalbuterol (XOPENEX) inhalation solution 1.25 mg, Q6H PRN    lidocaine (LIDODERM) 5 % patch 1 patch, Daily    lisinopril (ZESTRIL) tablet 20 mg, BID    metFORMIN (GLUCOPHAGE-XR) 24 hr tablet 500 mg, Daily With Dinner    metroNIDAZOLE (FLAGYL) tablet 500 mg, Q12H SANDRA    montelukast (SINGULAIR) tablet 10 mg, HS    morphine injection 2 mg, Q4H PRN    multivitamin-minerals (CENTRUM) tablet 1 tablet, Daily    ondansetron (ZOFRAN) injection 4 mg, Q6H PRN    oxyCODONE (ROXICODONE) IR tablet 5 mg, Q4H PRN **OR** oxyCODONE (ROXICODONE) immediate release tablet 10 mg, Q4H PRN    predniSONE tablet 5 mg, Daily    senna (SENOKOT) tablet 8.6 mg, Daily    Administrative Statements   Today, Patient Was Seen By: Zulema Pitt MD      **Please Note: This note may have been constructed using a voice recognition system.**

## 2025-05-19 NOTE — PLAN OF CARE
Problem: PAIN - ADULT  Goal: Verbalizes/displays adequate comfort level or baseline comfort level  Description: Interventions:- Encourage patient to monitor pain and request assistance- Assess pain using appropriate pain scale- Administer analgesics based on type and severity of pain and evaluate response- Implement non-pharmacological measures as appropriate and evaluate response- Consider cultural and social influences on pain and pain management- Notify physician/advanced practitioner if interventions unsuccessful or patient reports new pain  Outcome: Progressing     Problem: DISCHARGE PLANNING  Goal: Discharge to home or other facility with appropriate resources  Description: INTERVENTIONS:- Identify barriers to discharge w/patient and caregiver- Arrange for needed discharge resources and transportation as appropriate- Identify discharge learning needs (meds, wound care, etc.)- Arrange for interpretive services to assist at discharge as needed- Refer to Case Management Department for coordinating discharge planning if the patient needs post-hospital services based on physician/advanced practitioner order or complex needs related to functional status, cognitive ability, or social support system  Outcome: Progressing     Problem: INFECTION - ADULT  Goal: Absence or prevention of progression during hospitalization  Description: INTERVENTIONS:  - Assess and monitor for signs and symptoms of infection  - Monitor lab/diagnostic results  - Monitor all insertion sites, i.e. indwelling lines, tubes, and drains  - Monitor endotracheal if appropriate and nasal secretions for changes in amount and color  - [place] appropriate cooling/warming therapies per order  - Administer medications as ordered  - Instruct and encourage patient and family to use good hand hygiene technique  - Identify and instruct in appropriate isolation precautions for identified infection/condition  Outcome: Progressing  Goal: Absence of  fever/infection during neutropenic period  Description: INTERVENTIONS:  - Monitor WBC    Outcome: Progressing     Problem: SAFETY ADULT  Goal: Patient will remain free of falls  Description: INTERVENTIONS:  - Educate patient/family on patient safety including physical limitations  - Instruct patient to call for assistance with activity   - Consult OT/PT to assist with strengthening/mobility   - Keep Call bell within reach  - Keep bed low and locked with side rails adjusted as appropriate  - Keep care items and personal belongings within reach  - Initiate and maintain comfort rounds  - Make Fall Risk Sign visible to staff  - Offer Toileting every 2 Hours, in advance of need  - Initiate/Maintain bed alarm  - Obtain necessary fall risk management equipment:   - Apply yellow socks and bracelet for high fall risk patients  - Consider moving patient to room near nurses station  Outcome: Progressing  Goal: Maintain or return to baseline ADL function  Description: INTERVENTIONS:  -  Assess patient's ability to carry out ADLs; assess patient's baseline for ADL function and identify physical deficits which impact ability to perform ADLs (bathing, care of mouth/teeth, toileting, grooming, dressing, etc.)  - Assess/evaluate cause of self-care deficits   - Assess range of motion  - Assess patient's mobility; develop plan if impaired  - Assess patient's need for assistive devices and provide as appropriate  - Encourage maximum independence but intervene and supervise when necessary  - Involve family in performance of ADLs  - Assess for home care needs following discharge   - Consider OT consult to assist with ADL evaluation and planning for discharge  - Provide patient education as appropriate  Outcome: Progressing  Goal: Maintains/Returns to pre admission functional level  Description: INTERVENTIONS:  - Perform AM-PAC 6 Click Basic Mobility/ Daily Activity assessment daily.  - Set and communicate daily mobility goal to care team  and patient/family/caregiver.   - Collaborate with rehabilitation services on mobility goals if consulted  - Perform Range of Motion 4 times a day.  - Reposition patient every 3 hours.  - Dangle patient 3 times a day  - Stand patient 3 times a day  - Ambulate patient 3 times a day  - Out of bed to chair 3 times a day   - Out of bed for meals 3 times a day  - Out of bed for toileting  - Record patient progress and toleration of activity level   Outcome: Progressing     Problem: Knowledge Deficit  Goal: Patient/family/caregiver demonstrates understanding of disease process, treatment plan, medications, and discharge instructions  Description: Complete learning assessment and assess knowledge base.  Interventions:  - Provide teaching at level of understanding  - Provide teaching via preferred learning methods  Outcome: Progressing     Problem: SKIN/TISSUE INTEGRITY - ADULT  Goal: Skin Integrity remains intact(Skin Breakdown Prevention)  Description: Assess:-Perform Sae assessment -Clean and moisturize skin -Inspect skin when repositioning, toileting, and assisting with ADLS-Assess under medical devices -Assess extremities for adequate circulation and sensation Bed Management:-Have minimal linens on bed & keep smooth, unwrinkled-Change linens as needed when moist or perspiring-Avoid sitting or lying in one position for more than 2 hours while in bed-Keep HOB at 30 degrees Toileting:-Offer bedside commode-Assess for incontinence -Use incontinent care products after each incontinent episode Activity:-Mobilize patient 3 times a day-Encourage activity and walks on unit-Encourage or provide ROM exercises -Turn and reposition patient every 2 Hours-Use appropriate equipment to lift or move patient in bed-Instruct/ Assist with weight shifting  when out of bed in chair-Consider limitation of chair time 2 hour intervalsSkin Care:-Avoid use of baby powder, tape, friction and shearing, hot water or constrictive clothing-Relieve  pressure over bony prominences Do not massage red bony areasNext Steps:-Teach patient strategies to minimize risks Consider consults to  interdisciplinary teams   Outcome: Progressing  Goal: Incision(s), wounds(s) or drain site(s) healing without S/S of infection  Description: INTERVENTIONS- Assess and document dressing, incision, wound bed, drain sites and surrounding tissue- Provide patient and family education- Perform skin care/dressing changes every shift  Outcome: Progressing  Goal: Pressure injury heals and does not worsen  Description: Interventions:- Implement low air loss mattress or specialty surface (Criteria met)- Apply silicone foam dressing- Instruct/assist with weight shifting every 120 minutes when in chair - Limit chair time to 2 hour intervals- Use special pressure reducing interventions such as turning when in chair - Apply fecal or urinary incontinence containment device - Perform passive or active ROM every 2 hours- Turn and reposition patient & offload bony prominences every 2 hours - Utilize friction reducing device or surface for transfers - Consider consults to  interdisciplinary teams such as wound- Use incontinent care products after each incontinent episode such as wipes- Consider nutrition services referral as needed  Outcome: Progressing     Problem: METABOLIC, FLUID AND ELECTROLYTES - ADULT  Goal: Electrolytes maintained within normal limits  Description: INTERVENTIONS:  - Monitor labs and assess patient for signs and symptoms of electrolyte imbalances  - Administer electrolyte replacement as ordered  - Monitor response to electrolyte replacements, including repeat lab results as appropriate  - Instruct patient on fluid and nutrition as appropriate  Outcome: Progressing     Problem: HEMATOLOGIC - ADULT  Goal: Maintains hematologic stability  Description: INTERVENTIONS  - Assess for signs and symptoms of bleeding or hemorrhage  - Monitor labs  - Administer supportive blood  products/factors as ordered and appropriate  Outcome: Progressing     Problem: HEMATOLOGIC - ADULT  Goal: Maintains hematologic stability  Description: INTERVENTIONS  - Assess for signs and symptoms of bleeding or hemorrhage  - Monitor labs  - Administer supportive blood products/factors as ordered and appropriate  5/18/2025 2101 by Isaias Graham RN  Outcome: Progressing  5/18/2025 2100 by Isaias Graham RN  Outcome: Progressing     Problem: CARDIOVASCULAR - ADULT  Goal: Maintains optimal cardiac output and hemodynamic stability  Description: INTERVENTIONS:  - Monitor I/O, vital signs and rhythm  - Monitor for S/S and trends of decreased cardiac output  - Administer and titrate ordered vasoactive medications to optimize hemodynamic stability  - Assess quality of pulses, skin color and temperature  - Assess for signs of decreased coronary artery perfusion  - Instruct patient to report change in severity of symptoms  Outcome: Progressing  Goal: Absence of cardiac dysrhythmias or at baseline rhythm  Description: INTERVENTIONS:  - Continuous cardiac monitoring, vital signs, obtain 12 lead EKG if ordered  - Administer antiarrhythmic and heart rate control medications as ordered  - Monitor electrolytes and administer replacement therapy as ordered  Outcome: Progressing

## 2025-05-19 NOTE — ASSESSMENT & PLAN NOTE
Lab Results   Component Value Date    HGBA1C 9.3 (H) 04/09/2025     Recent Labs     05/18/25  1126 05/18/25  1604 05/18/25  2109 05/19/25  0730   POCGLU 185* 227* 182* 90     Prior to admission meds: glipizide, linagliptin and metformin  A1c 9.3.  Endocrinology consulted by vascular surgery  Continue metformin.  Restarted glipizide and did have hypoglycemia this morning.  Discussed need to hold evening glipizide if oral intake is suboptimal

## 2025-05-19 NOTE — ASSESSMENT & PLAN NOTE
- met SIRS criteria at [facility] with 05/07/25 with tachycardia and leukocytosis   - 5/16: Underwent right partial fifth ray resection: presumed surgical cure  - s/p right CFA SFA endarterectomy/stenting 5/13/2025   - currently on Plavix 75 mg daily (podiatry and vascular following)

## 2025-05-19 NOTE — ASSESSMENT & PLAN NOTE
- [facility]io 01/06/25: predominant NSR with 1 NSVT lasting 5 beats, 2% AF/flutter burden  - rate control: atenolol 25 mg daily   - AC: Eliquis 5 mg BID

## 2025-05-19 NOTE — PLAN OF CARE
Problem: OCCUPATIONAL THERAPY ADULT  Goal: Performs self-care activities at highest level of function for planned discharge setting.  See evaluation for individualized goals.  Description: Treatment Interventions: ADL retraining, Functional transfer training, UE strengthening/ROM, Endurance training, Cognitive reorientation, Patient/family training, Equipment evaluation/education, Neuromuscular reeducation, Compensatory technique education, Continued evaluation, Energy conservation, Activityengagement          See flowsheet documentation for full assessment, interventions and recommendations.   Note: Limitation: Decreased ADL status, Decreased UE strength, Decreased Safe judgement during ADL, Decreased endurance, Decreased self-care trans, Decreased high-level ADLs (dec balance, functional reach, coordination)  Prognosis: Fair  Assessment: Patient is a 79 y.o. year old male seen for OT re-eval s/p admit to [facility] on 5/11/2025 with right fifth metatarsal ulceration with underlying osteomyelitis and surrounding right foot cellulitis s/p right partial fifth ray resection - NWB RLE.  OT consulted to assess ADLs/IADLs/functional mobility and assist w/ D/C planning. Patient demonstrates the following deficits impacting occupational performance: weakness, decreased strength , decreased balance, decreased activity tolerance, limited functional reach, impaired GMC, impaired memory, impaired sequencing, impaired problem solving, decreased safety awareness, increased pain, orthopedic restrictions, impaired coordination, decreased cardiovascular endurance, and decreased skin integrity . These impairments, as well at pt’s NEER[place] home environment, limited home support, difficulty performing ADLs, difficulty performing IADLs, difficulty performing transfers/mobility, limited insight into deficits, compliance, decreased initiation and engagement, fall risk , functional decline , and increased reliance on DME , limit pt’s ability to  safely engage in all baseline areas of occupation. Pt's CLOF as follows: eating/grooming: supervision , UB ADLs: Virgie, LB ADLs: modA and maxA, toileting: maxA, bed mobility: Virgie, functional transfers: modAx2 and maxAx2, functional mobility: unable, sitting/standing tolerance: ~5 min/~45 sec. Pt would benefit from continued skilled OT while in acute setting to address deficits as defined above and to maximize (I) w/ ADLs/functional mobility. Occupational performance areas to address include: grooming, bathing/shower, toilet hygiene, dressing, medication management, socialization, health maintenance, functional mobility, community mobility, clothing management, and cleaning. At this time, recommendation for pt to receive post-acute rehabilitation services at a Level II (moderate resource intensity) due to above deficits and CLOF. OT will continue to follow pt 3-5x/wk to address the goals listed below to  w/in 10-14 days.     Rehab Resource Intensity Level, OT: II (Moderate Resource Intensity)

## 2025-05-19 NOTE — ASSESSMENT & PLAN NOTE
Prior to admission on [facility] ([place]  No exacerbation  Albuterol metered-dose inhaler as needed

## 2025-05-19 NOTE — ASSESSMENT & PLAN NOTE
Transferred here for vascular surgery intervention  Underwent right CFA SFA endarterectomy/stenting 5/13/2025  Aspirin discontinued.  Started on clopidogrel for stents  Continue statin  Patient is also on Eliquis for paroxysmal atrial fibrillation

## 2025-05-19 NOTE — CONSULTS
Consultation - Cardiology   Name: Gold Van 79 y.o. male I MRN: [id number]  Unit/Bed#: [place] [address] -01 I Date of Admission: 5/11/2025   Date of Service: 5/19/2025 I Hospital Day: 8   Inpatient consult to Cardiology  Consult performed by: Laurence Payne PA-C  Consult ordered by: Zulema Pitt MD        Physician Requesting Evaluation: Zulema Pitt MD   Reason for Evaluation / Principal Problem: elevated troponin, SOB        TODAY (05/19/25):   Volume overloaded likely from IV fluids and blood transfusion. Still with crackles on exam today. Would continue with IV lasix 40 mg BID.   Will check an updated echo to assess function and wall motion with elevated troponin. Likely non-MI troponin elevated in the setting of volume overload. EKG did not show any ischemia.  Monitor I/O's closely. Will not be able to do standing weights as he is non-weightbearing  Monitor BMP, keep K > 4. Will check Mg tomorrow AM.  Continue amlodipine, Eliquis, lisinopril, and atenolol  Continue Plavix, vascular and podiatry following for osteomyelitis and PAD       Assessment & Plan  Sepsis without acute organ dysfunction (HCC)  Ulcer of right foot with fat layer exposed secondary to osteomyelitis  Severe peripheral arterial disease (HCC)  - met SIRS criteria at [facility] with 05/07/25 with tachycardia and leukocytosis   - 5/16: Underwent right partial fifth ray resection: presumed surgical cure  - s/p right CFA SFA endarterectomy/stenting 5/13/2025   - currently on Plavix 75 mg daily (podiatry and vascular following)  Chronic heart failure with preserved ejection fraction (HFpEF) ([facility])  - TTE 10/01/24: LVEF 50%, mild hypokinesia of the posterior wall, grade I DD, mild AR   - BNP pending   - CXR 05/18/25: moderate interstitial edema, nodular opacity at the left base not visible on prior studies   - Neurohormonal Blockade:   -- beta blocker: atenolol 25 mg daily  -- ACE/ARB/ARNi: lisinopril 5 mg daily   --  diuretic: none   -- inpt diuretic: IV lasix 40 mg BID (started on 05/19/25)  Essential hypertension  - BP overall stable during admission   - home rx: amlodipine 5 mg daily, lisinopril 20 mg daily, atenolol 25 mg daily   PAF (paroxysmal atrial fibrillation) (HCC)  - Zio 01/06/25: predominant NSR with 1 NSVT lasting 5 beats, 2% AF/flutter burden  - rate control: atenolol 25 mg daily   - AC: Eliquis 5 mg BID  Troponin I above reference range  - troponin trend: 163 > 155 > 193 > 237  - EKG shows nonspecific ST and T wave abnormalities   Type 2 diabetes mellitus with complication, without long-term current use of insulin (HCC)  Lab Results   Component Value Date    HGBA1C 9.3 (H) 04/09/2025     Postoperative anemia  - baseline ~ 10-12 with drop to 7 after surgery   - s/p 1 unit of PRBCs 05/18/25   Mild intermittent asthma without complication    History Of Present Illness:  Gold Van is a 79 year old with PMH of chronic HFpEF, PAF on AC, HLD, osteomyelitis, asthma, TIIDM, and  who presented to [facility] 05/07/25 complaining of foot swelling. He mets SIRS criteria in the ED with tachycardia and leukocytosis and started om antibiotics with transfer to [facility] for vascular and podiatry consult. He was seen by Dr. Gibbs for pre-operative risk assessment and deemed high risk. He was transferred to [facility] 05/11/25.     He underwent right CFA/SFA endartectomy w/bovine pericardial patch and DCB stenting of SFA on 05/13/25 and seen by podiatry with right lateral 5th metatarsal wound with MRI from April showing osteomyelitis and underwent resection on 05/16/25 and had a wound vac placed at this time. Unfortunately, he bled through his wound VAC dressings leann and Hg trended down to 7.0. required one unit of PRBCs 05/18/25. Shortly after his blood transfusion, [facility]IM called to bedside for acute onset of SOB and wheezing. CXR showed moderate interstitial edema and he was given one dose of IV lasix with 1.7 L of urine output.     Pt  states he feels okay this morning. Did not experience anymore episodes of SOB since yesterday. Has never been on an outpatient diuretic before. Has some SOB that is usually relieved by his inhalers. Weights are around 165-170 lbs. Does not feel any abdominal bloating or edematous. Notes for the past couple days, he felt a little unwell but could not really describe what he was feeling. Does not currently have the best appetite. Denies any chest pain, palpitations, flutters in the chest.    Rhythm History: PAF    Primary Cardiologist: Dr. Gibbs    ROS:  Review of Systems     Past Medical History:        Past Medical History:   Diagnosis Date    Asthma     COVID-19     CVA (cerebral vascular accident) (HCC)     Diabetes mellitus (HCC)     Environmental allergies     Hyperlipidemia     Hypertension     Macular degeneration 07/13/2020    right eye    Orthostatic hypotension     Osteopenia     Pneumonia     Pneumothorax     Sinusitis       Past Surgical History:   Procedure Laterality Date    CARPAL TUNNEL RELEASE Left 08/2024    CATARACT EXTRACTION Left 07/2024    COLONOSCOPY      KNEE CARTILAGE SURGERY Right 1964    MN AMPUTATION METATARSAL W/TOE SINGLE Right 5/16/2025    Procedure: RAY RESECTION FOOT - R partial 5th ray resection;  Surgeon: Reinaldo Brewer DPM;  Location: [facility];  Service: Podiatry    MN TEAEC W/WO PATCH GRAFT COMMON FEMORAL Right 5/13/2025    Procedure: Right common femoral endarterectomy with bovine pericardial patch Right lower extremity angiogram Third order cannulation of the right anterior tibial artery Balloon angioplasty of the right anterior tibial with a 3x220 Fernando balloon DCB angioplasty of the SFA with a 6x150 Tillar balloon Stenting of the right SFA with two 6x150 Grazyna stents, 6x5cm Viabahn, and a 7x5cm viabahn Post-di    VASECTOMY      WISDOM TOOTH EXTRACTION      x4       Family History:     Family History   Problem Relation Age of Onset    Asthma Mother     Emphysema Mother      Cancer Father     Asthma Brother     Cancer Brother         Social History:       Social History     Socioeconomic History    Marital status: /Civil Union     Spouse name: None    Number of children: None    Years of education: None    Highest education level: None   Occupational History    None   Tobacco Use    Smoking status: Former    Smokeless tobacco: Never    Tobacco comments:     quit about 25 years ago   Vaping Use    Vaping status: Never Used   Substance and Sexual Activity    Alcohol use: Never    Drug use: Never    Sexual activity: Yes     Partners: Female   Other Topics Concern    None   Social History Narrative    Daily caffeine use- 1 cup of tea, 2 cups of coffee     Social Drivers of Health     Financial Resource Strain: Not on file   Food Insecurity: No Food Insecurity (5/13/2025)    Nursing - Inadequate Food Risk Classification     Worried About Running Out of Food in the Last Year: Never true     Ran Out of Food in the Last Year: Never true     Ran Out of Food in the Last Year: Never true   Transportation Needs: No Transportation Needs (5/13/2025)    Nursing - Transportation Risk Classification     Lack of Transportation: Not on file     Lack of Transportation: No   Physical Activity: Not on file   Stress: Not on file   Social Connections: Unknown (6/18/2024)    Received from [employer]     How often do you feel lonely or isolated from those around you? (Adult - for ages 18 years and over): Not on file   Intimate Partner Violence: Unknown (5/13/2025)    Nursing IPS     Feels Physically and Emotionally Safe: Not on file     Physically Hurt by Someone: Not on file     Humiliated or Emotionally Abused by Someone: Not on file     Physically Hurt by Someone: No     Hurt or Threatened by Someone: No   Housing Stability: Unknown (5/13/2025)    Nursing: Inadequate Housing Risk Classification     Has Housing: Not on file     Worried About Losing Housing: Not on file     Unable to  Get Utilities: Not on file     Unable to Pay for Housing in the Last Year: No     Has Housin        Allergy:        Allergies[1]    Medications:       Prior to Admission medications    Medication Sig Start Date End Date Taking? Authorizing Provider   clopidogrel (PLAVIX) 75 mg tablet Take 1 tablet (75 mg total) by mouth daily 25  Yes Bean Sweeney DO   albuterol (ACCUNEB) 1.25 MG/3ML nebulizer solution Take 1.25 mg by nebulization every 6 (six) hours as needed for wheezing    Historical Provider, MD   albuterol (PROVENTIL HFA,VENTOLIN HFA) 90 mcg/act inhaler 1 puff if needed    Historical Provider, MD   amLODIPine (NORVASC) 5 mg tablet Take 5 mg by mouth 2 (two) times a day    Historical Provider, MD   apixaban (ELIQUIS) 5 mg 5 mg 2 (two) times a day 2/10/25   Historical Provider, MD   aspirin 81 mg chewable tablet Chew 1 tablet (81 mg total) daily 10/5/24   BELGICA Nelson   atenolol (TENORMIN) 25 mg tablet Take 25 mg by mouth daily    Historical Provider, MD   atorvastatin (LIPITOR) 40 mg tablet Take 1 tablet (40 mg total) by mouth every evening 10/16/24   Annie Martines PA-C   Budeson-Glycopyrrol-Formoterol (Breztri Aerosphere) 160-9-4.8 MCG/ACT AERO Take 2 puffs by mouth Every 12 hours    Historical Provider, MD   collagenase (SANTYL) ointment Apply topically daily To wounds of R lateral foot and heel  Patient not taking: Reported on 2025  Era Cardenas PA-C   famotidine (PEPCID) 20 mg tablet Take 1 tablet (20 mg total) by mouth 2 (two) times a day 10/4/24   BELGICA Nelson   glipiZIDE (GLUCOTROL XL) 10 mg 24 hr tablet Take 10 mg by mouth 2 (two) times a day    Historical Provider, MD   guaiFENesin (MUCINEX) 600 mg 12 hr tablet Take 1 tablet (600 mg total) by mouth 2 (two) times a day  Patient not taking: Reported on 2025 10/16/24   Annie Martines PA-C   linaGLIPtin (Tradjenta) 5 MG TABS Take 5 mg by mouth daily with dinner    Historical Provider, MD    lisinopril (ZESTRIL) 20 mg tablet Take 20 mg by mouth 2 (two) times a day    Historical Provider, MD   LORazepam (ATIVAN) 0.5 mg tablet Take 0.5 mg by mouth if needed    Historical Provider, MD   metFORMIN (GLUCOPHAGE-XR) 500 mg 24 hr tablet Take 500 mg by mouth daily with dinner    Historical Provider, MD   montelukast (SINGULAIR) 10 mg tablet Take 1 tablet (10 mg total) by mouth daily at bedtime 10/16/24   Annie Martines PA-C   Multiple Vitamins-Minerals (Multivitamin Adults) TABS Take 1 tablet by mouth daily    Historical Provider, MD   OneTouch Ultra test strip  3/26/25   Historical Provider, MD   predniSONE 5 mg tablet Take 1 tablet (5 mg total) by mouth every other day  Patient taking differently: Take 5 mg by mouth daily 10/17/24   Annie Martines PA-C       Vitals:    05/19/25 0600 05/19/25 0605 05/19/25 0728 05/19/25 0837   BP:  127/68 127/69 130/69   BP Location:  Right arm Right arm    Pulse:  89 90 (!) 106   Resp:  16 16    Temp:  98.9 °F (37.2 °C) 98.2 °F (36.8 °C)    TempSrc:  Oral Oral    SpO2:  91% 90% 91%   Weight: 78.1 kg (172 lb 2.9 oz)      Height:           Exam:  Physical Exam  Vitals and nursing note reviewed.   Constitutional:       General: He is not in acute distress.     Appearance: Normal appearance. He is not ill-appearing.   HENT:      Head: Normocephalic and atraumatic.      Nose: Nose normal.      Mouth/Throat:      Mouth: Mucous membranes are moist.     Eyes:      General: No scleral icterus.    Neck:      Vascular: No JVD.     Cardiovascular:      Rate and Rhythm: Normal rate and regular rhythm.      Pulses:           Radial pulses are 2+ on the right side and 2+ on the left side.      Heart sounds: No murmur heard.  Pulmonary:      Effort: Pulmonary effort is normal. No respiratory distress.      Breath sounds: No stridor. Rales present. No wheezing or rhonchi.   Abdominal:      General: Abdomen is flat.     Musculoskeletal:         General: No swelling. Normal range of  motion.      Cervical back: Normal range of motion.      Right lower leg: Edema (right foot to mid shin wrapped, did not inspect incisions) present.      Left lower leg: No edema.     Skin:     General: Skin is warm and dry.      Capillary Refill: Capillary refill takes less than 2 seconds.     Neurological:      Mental Status: He is alert. Mental status is at baseline.     Psychiatric:         Thought Content: Thought content normal.          DATA:        ECG:        Telemetry:   Normal sinus rhythm, HR 86    Echocardiogram:  10/01/24    Left Ventricle: Left ventricular cavity size is normal. Wall thickness is normal. The left ventricular ejection fraction is 50%. Systolic function is low normal. There is mild hypokinesia of the posterior wall. Diastolic function is mildly abnormal, consistent with grade I (abnormal) relaxation.    Aortic Valve: There is mild regurgitation.    The right ventricular systolic pressure is normal. The estimated right ventricular systolic pressure is 37.00 mmHg.       Ischemic Testing:  Stress test:   NM stress 01/09/25: no evidence of ischemia       Weights:    Wt Readings from Last 10 Encounters:   05/19/25 78.1 kg (172 lb 2.9 oz)   05/08/25 74.8 kg (165 lb)   04/28/25 73.9 kg (163 lb)   04/23/25 74.6 kg (164 lb 8 oz)   04/08/25 74.4 kg (164 lb)   04/04/25 74.4 kg (164 lb)   03/25/25 74.4 kg (164 lb)   03/18/25 74.4 kg (164 lb)   01/28/25 74.8 kg (164 lb 12.8 oz)   01/03/25 73.5 kg (162 lb)            Lab Studies:               Results from last 7 days   Lab Units 05/19/25  0548 05/18/25  0440 05/17/25  1751 05/17/25  0542   WBC Thousand/uL 11.33* 9.66  --  10.46*   HEMOGLOBIN g/dL 9.0* 7.0* 7.7* 7.2*   HEMATOCRIT % 27.6* 21.9* 23.9* 23.0*   PLATELETS Thousands/uL 399* 307  --  264     Results from last 7 days   Lab Units 05/19/25  0548 05/18/25  0440 05/17/25  0542 05/16/25  0545 05/15/25  0445 05/14/25  0436 05/13/25  1512 05/13/25  0455   POTASSIUM mmol/L 4.1 4.0 4.5 4.0   < > 4.6   --  4.2   CHLORIDE mmol/L 99 103 104 103   < > 104  --  101   CO2 mmol/L 28 26 27 25   < > 23  --  26   CO2, I-STAT mmol/L  --   --   --   --   --   --  22  --    BUN mg/dL 9 10 11 14   < > 14  --  13   CREATININE mg/dL 0.98 0.83 0.90 0.87   < > 1.13  --  0.93   CALCIUM mg/dL 8.7 8.0* 8.3* 8.1*   < > 8.1*  --  8.7   ALK PHOS U/L  --   --   --  51  --  52  --  62   ALT U/L  --   --   --  4*  --  15  --  22   AST U/L  --   --   --  12*  --  31  --  46*   GLUCOSE, ISTAT mg/dl  --   --   --   --   --   --  262*  --     < > = values in this interval not displayed.       Laurence Payne PA-C         [1]   Allergies  Allergen Reactions    Ciprofloxacin Shortness Of Breath    Sulfamethoxazole Shortness Of Breath    Trimethoprim Shortness Of Breath    Dexamethasone Other (See Comments)    Triamcinolone Other (See Comments)    Bactrim [Sulfamethoxazole-Trimethoprim] Fever     Fever of 107

## 2025-05-19 NOTE — ASSESSMENT & PLAN NOTE
Right fifth metatarsal ulceration with underlying osteomyelitis and surrounding right foot cellulitis  Treated with cefazolin/Flagyl  5/16: Underwent right partial fifth ray resection: presumed surgical cure  Wound care/VAC per podiatry: Removed 5/17 secondary to bleeding  Anticipate cont abx for 5 days post op (anticipate transition to po augmentin at dc, depending on sensitivities)

## 2025-05-19 NOTE — ASSESSMENT & PLAN NOTE
- met SIRS criteria at [facility] with 05/07/25 with tachycardia and leukocytosis   - 5/16: Underwent right partial fifth ray resection: presumed surgical cure  - s/p right CFA SFA endarterectomy/stenting 5/13/2025   - currently on Plavix 75 mg daily (podiatry and vascular following)

## 2025-05-19 NOTE — ASSESSMENT & PLAN NOTE
Prior baseline hemoglobin appears to range 10-12  Postprocedure hemoglobin decreased to 7.0 when patient was symptomatic: Secondary to expected procedural blood loss  On 5/18 transfused 1 unit PRBC   Repeat hemoglobin 9.0  Hemoccult all stools    Results from last 7 days   Lab Units 05/19/25  0548 05/18/25  0440 05/17/25  1751 05/17/25  0542   HEMOGLOBIN g/dL 9.0* 7.0* 7.7* 7.2*   MCV fL 91 94  --  94

## 2025-05-19 NOTE — ASSESSMENT & PLAN NOTE
- TTE 10/01/24: LVEF 50%, mild hypokinesia of the posterior wall, grade I DD, mild AR   - BNP pending   - CXR 05/18/25: moderate interstitial edema, nodular opacity at the left base not visible on prior studies   - Neurohormonal Blockade:               -- beta blocker: atenolol 25 mg daily  -- ACE/ARB/ARNi: lisinopril 5 mg daily   -- diuretic: none   -- inpt diuretic: IV lasix 40 mg BID (started on 05/19/25)

## 2025-05-19 NOTE — OCCUPATIONAL THERAPY NOTE
Occupational Therapy Re-Evaluation     Patient Name: Gold Van  Today's Date: 5/19/2025  Problem List  Principal Problem:    Sepsis without acute organ dysfunction (HCC)  Active Problems:    Type 2 diabetes mellitus with complication, without long-term current use of insulin (HCC)    Essential hypertension    Mild intermittent asthma without complication    Lung nodule    Pruritus    Severe peripheral arterial disease (HCC)    Ulcer of right foot with fat layer exposed secondary to osteomyelitis    PAF (paroxysmal atrial fibrillation) (HCC)    Chronic heart failure with preserved ejection fraction (HFpEF) (HCC)    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 (HCC)    Pulmonary hypertension (HCC)    Postoperative anemia    Osteomyelitis (HCC)    Troponin I above reference range    Volume overload    Past Medical History  Past Medical History:   Diagnosis Date    Asthma     COVID-19     CVA (cerebral vascular accident) (HCC)     Diabetes mellitus (HCC)     Environmental allergies     Hyperlipidemia     Hypertension     Macular degeneration 07/13/2020    right eye    Orthostatic hypotension     Osteopenia     Pneumonia     Pneumothorax     Sinusitis      Past Surgical History  Past Surgical History:   Procedure Laterality Date    CARPAL TUNNEL RELEASE Left 08/2024    CATARACT EXTRACTION Left 07/2024    COLONOSCOPY      KNEE CARTILAGE SURGERY Right 1964    AK AMPUTATION METATARSAL W/TOE SINGLE Right 5/16/2025    Procedure: RAY RESECTION FOOT - R partial 5th ray resection;  Surgeon: Reinaldo Brewer DPM;  Location: [facility];  Service: Podiatry    AK TEAEC W/WO PATCH GRAFT COMMON FEMORAL Right 5/13/2025    Procedure: Right common femoral endarterectomy with bovine pericardial patch Right lower extremity angiogram Third order cannulation of the right anterior tibial artery Balloon angioplasty of the right anterior tibial with a 3x220 Fernando balloon DCB angioplasty of the SFA  "with a 6x150 [place] balloon Stenting of the right SFA with two 6x150 Grazyna stents, 6x5cm Viabahn, and a 7x5cm viabahn Post-di    VASECTOMY      WISDOM TOOTH EXTRACTION      x4        05/19/25 0942   OT Last Visit   OT Visit Date 05/19/25   Note Type   Note type Re-Evaluation   Pain Assessment   Pain Assessment Tool 0-10   Pain Score 7   Pain Location/Orientation Orientation: Right;Location: Foot   Hospital Pain Intervention(s) Repositioned;Emotional support;Rest;Elevated   Restrictions/Precautions   Weight Bearing Precautions Per Order Yes   RLE Weight Bearing Per Order (S)  NWB   Other Precautions Cognitive;Chair Alarm;Bed Alarm;Fall Risk;WBS;Pain   Home Living   Additional Comments Refer to I.E. on 5/12   Prior Function   Comments Refer to I.E. on 5/12   Subjective   Subjective \"I thought this would be a little easier\"   ADL   Where Assessed Chair   Eating Assistance 7  Independent   Grooming Assistance 5  Supervision/Setup   UB Bathing Assistance 4  Minimal Assistance   LB Bathing Assistance 3  Moderate Assistance   UB Dressing Assistance 4  Minimal Assistance   LB Dressing Assistance 2  Maximal Assistance   Toileting Assistance  2  Maximal Assistance   Bed Mobility   Supine to Sit 4  Minimal assistance   Additional items Assist x 1;HOB elevated;Bedrails;Increased time required;Verbal cues   Transfers   Sit to Stand 3  Moderate assistance   Additional items Assist x 2;Bedrails;Increased time required;Verbal cues;Other  (RW)   Stand to Sit 3  Moderate assistance   Additional items Assist x 2;Bedrails;Increased time required;Verbal cues;Other  (RW)   Stand pivot 2  Maximal assistance   Additional items Assist x 2;Increased time required;Impulsive;Verbal cues   Additional Comments difficulty with maintaining WBS. First STS, pt with increased difficulty maintaining static standing (posterior lean), difficulty weight shifting - w use of RW. 2nd STS/ SPT without RW, maxAx2. Pt reports difficulty with sequencing " mobility tasks. Would benefit from slideboard trial next session   Functional Mobility   Additional Comments GUILLE   Balance   Static Sitting Fair -   Dynamic Sitting Poor +   Static Standing Poor -   Dynamic Standing   (Ax2)   Ambulatory   (Ax2)   Activity Tolerance   Activity Tolerance Patient limited by fatigue;Patient limited by pain   Medical Staff Made Aware Sarahi PT   Nurse Made Aware yes   RUE Assessment   RUE Assessment   (grossly 4-/5)   LUE Assessment   LUE Assessment   (grossly 4-/5)   Hand Function   Gross Motor Coordination Impaired   Fine Motor Coordination Functional   Sensation   Light Touch Partial deficits in the RLE;Partial deficits in the LLE   Vision-Basic Assessment   Current Vision Wears glasses all the time   Vision - Complex Assessment   Ocular Range of Motion Intact   Psychosocial   Patient Behaviors/Mood Cooperative   Perception   Inattention/Neglect Appears intact   Cognition   Overall Cognitive Status Impaired   Arousal/Participation Alert;Responsive;Cooperative   Attention Attends with cues to redirect   Memory Decreased recall of precautions;Decreased short term memory;Decreased recall of recent events   Following Commands Follows one step commands with increased time or repetition   Assessment   Limitation Decreased ADL status;Decreased UE strength;Decreased Safe judgement during ADL;Decreased endurance;Decreased self-care trans;Decreased high-level ADLs  (dec balance, functional reach, coordination)   Prognosis Fair   Assessment Patient is a 79 y.o. year old male seen for OT re-eval s/p admit to [facility] on 5/11/2025 with right fifth metatarsal ulceration with underlying osteomyelitis and surrounding right foot cellulitis s/p right partial fifth ray resection - NWB RLE.  OT consulted to assess ADLs/IADLs/functional mobility and assist w/ D/C planning. Patient demonstrates the following deficits impacting occupational performance: weakness, decreased strength , decreased balance,  decreased activity tolerance, limited functional reach, impaired GMC, impaired memory, impaired sequencing, impaired problem solving, decreased safety awareness, increased pain, orthopedic restrictions, impaired coordination, decreased cardiovascular endurance, and decreased skin integrity . These impairments, as well at pt’s NEERU home environment, limited home support, difficulty performing ADLs, difficulty performing IADLs, difficulty performing transfers/mobility, limited insight into deficits, compliance, decreased initiation and engagement, fall risk , functional decline , and increased reliance on DME , limit pt’s ability to safely engage in all baseline areas of occupation. Pt's CLOF as follows: eating/grooming: supervision , UB ADLs: Virgie, LB ADLs: modA and maxA, toileting: maxA, bed mobility: Virgie, functional transfers: modAx2 and maxAx2, functional mobility: unable, sitting/standing tolerance: ~5 min/~45 sec. Pt would benefit from continued skilled OT while in acute setting to address deficits as defined above and to maximize (I) w/ ADLs/functional mobility. Occupational performance areas to address include: grooming, bathing/shower, toilet hygiene, dressing, medication management, socialization, health maintenance, functional mobility, community mobility, clothing management, and cleaning. At this time, recommendation for pt to receive post-acute rehabilitation services at a Level II (moderate resource intensity) due to above deficits and CLOF. OT will continue to follow pt 3-5x/wk to address the goals listed below to  w/in 10-14 days.   Goals   Patient Goals to move better   LTG Time Frame 10-14   Plan   Treatment Interventions ADL retraining;Functional transfer training;UE strengthening/ROM;Endurance training;Cognitive reorientation;Patient/family training;Equipment evaluation/education;Neuromuscular reeducation;Compensatory technique education;Continued evaluation;Energy  conservation;Activityengagement   Goal Expiration Date 06/02/25   OT Treatment Day 0   OT Frequency 3-5x/wk   Discharge Recommendation   Rehab Resource Intensity Level, OT II (Moderate Resource Intensity)   Additional Comments  Co treatment with PT secondary to complex medical condition of pt, possible A of 2 required to achieve and maintain transitional movements, requiring the need of skilled therapeutic intervention of 2 therapists to achieve delivery of services.   AM-PAC Daily Activity Inpatient   Lower Body Dressing 2   Bathing 2   Toileting 2   Upper Body Dressing 3   Grooming 3   Eating 3   Daily Activity Raw Score 15   Daily Activity Standardized Score (Calc for Raw Score >=11) 34.69   AM-PAC Applied Cognition Inpatient   Following a Speech/Presentation 3   Understanding Ordinary Conversation 3   Taking Medications 3   Remembering Where Things Are Placed or Put Away 2   Remembering List of 4-5 Errands 2   Taking Care of Complicated Tasks 2   Applied Cognition Raw Score 15   Applied Cognition Standardized Score 33.54     Occupational Therapy goals: In 7-14 days:     1- Patient will verbalize and demonstrate use of energy conservation/deep breathing technique and work simplification skills during functional activity with no verbal cues.   2- Patient will verbalize and demonstrate good body mechanics and joint protection techniques during ADLs/IADLs with no verbal cues   3- Pt will complete bed mobility at a Mod I level w/ G balance/safety demonstrated to decrease caregiver assistance required   4- Patient will increase OOB/ sitting tolerance to 2-4 hours per day for increased participation in self care and leisure tasks with no s/s of exertion.   5-Patient will increase standing tolerance time to 2 minutes with bl UE support to facilitate improved performance w ADLs  6- Pt will improve functional transfers to ModA on/off all surfaces using DME as needed w/ G balance/safety   7- Patient will complete UB ADLs  with s/u utilizing appropriate DME/AE PRN   8- Patient will complete LB ADLs with Virgie utilizing appropriate DME/AE PRN   9- Patient will complete toileting tasks with Virgie with G hygiene/thoroughness utilizing appropriate DME/AE PRN   10- Pt will improve functional mobility during ADL/IADL/leisure tasks to ModA using DME as needed w/ G balance/safety    11- Pt will be attentive 100% of the time during ongoing cognitive assessment w/ G participation to assist w/ safe d/c planning/recommendations   12- Pt will participate in simulated IADL management task to increase independence to Mod I w/ G safety and endurance   13- Pt will increase BUE strength by 1MM grade via AROM/AAROM/PROM exercises to increase independence in ADLs and transfers     14- Pt will demonstrate 100% adherence to WBS (NWB RLE) during all functional activities w/o cues from therapist      NITISH Smith/STEPHANIE

## 2025-05-19 NOTE — ASSESSMENT & PLAN NOTE
- met SIRS criteria at [facility] with 05/07/25 with tachycardia and leukocytosis   - 5/16: Underwent right partial fifth ray resection: presumed surgical cure  - s/p right CFA SFA endarterectomy/stenting 5/13/2025   - currently on Plavix 75 mg daily (podiatry and vascular following)

## 2025-05-19 NOTE — CASE MANAGEMENT
Case Management Discharge Planning Note    Patient name Gold Van  Location [place] [address] /[facility] 2 M* MRN [id number]  : 1945 Date 2025       Current Admission Date: 2025  Current Admission Diagnosis:Sepsis without acute organ dysfunction ([facility])   Patient Active Problem List    Diagnosis Date Noted    Osteomyelitis ([facility]) 2025    Troponin I above reference range 2025    Volume overload 2025    Postoperative anemia 2025    Pulmonary hypertension ([facility]) 2025    Sepsis without acute organ dysfunction ([facility]) 2025    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 ([facility]) 2025    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 ([facility]) 2025    PAF (paroxysmal atrial fibrillation) ([facility]) 2025    Chronic heart failure with preserved ejection fraction (HFpEF) ([facility]) 2025    Ulcer of right foot with fat layer exposed secondary to osteomyelitis 2025    Severe peripheral arterial disease ([facility]) 2024    Moderate protein-calorie malnutrition ([facility]) 10/09/2024    Gastroesophageal reflux disease without esophagitis 10/04/2024    Impaired mobility and activities of daily living 10/04/2024    Neuropathy 10/04/2024    Foot drop, left 10/04/2024    Abnormal echocardiogram 10/03/2024    CVA (cerebrovascular accident) ([facility]) 10/02/2024    Dysmetria 10/01/2024    COVID-19 2024    Acute respiratory insufficiency 2024    Shortness of breath 2024    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) 10/23/2017    Essential hypertension 10/23/2017    Mild intermittent asthma without complication 10/23/2017    Mixed hyperlipidemia 10/23/2017      LOS (days): 8  Geometric Mean LOS (GMLOS) (days): 5  Days to GMLOS:-2.5     OBJECTIVE:  Risk of Unplanned Readmission Score: 28.77         Current admission status: Inpatient   Preferred Pharmacy:   [facility] - [place], [place] - [address]  [address] [facility] [address]  Phone: [phone] Fax: [phone]    [facility] - [place], [place] - [address]  [address]  [facility] [address]  Phone: [phone] Fax: [phone]    [facility] ([facility]) #[id number] [place], [place] - [address] [address]  Phone: [phone] Fax: [phone]    Primary Care Provider: Shayne Diaz MD    Primary Insurance: [facility]  Secondary Insurance:     DISCHARGE DETAILS:    Discharge planning discussed with:: Pt and Pt's Wife Mya  Freedom of Choice: Yes  Comments - Freedom of Choice: Therapy recommendations reviewed with both in agreement with STR wishing for referrals to [facility] as 1st choice as has been before and pleased with outcomes.  Understands further referrals need to be made should [facility] not be able to accept.  Will continue to follow to assist with finalized dc POC.  CM contacted family/caregiver?: Yes  Were Treatment Team discharge recommendations reviewed with patient/caregiver?: Yes  Did patient/caregiver verbalize understanding of patient care needs?: Yes       Contacts  Patient Contacts: Mya Van  Relationship to Patient:: Family  Contact Method: In Person  Reason/Outcome: Discharge Planning, Referral    Requested Home Health Care         Is the patient interested in C at discharge?: No                   Treatment Team Recommendation: Short Term Rehab  Discharge Destination Plan:: Short Term Rehab, SNF (location  TBD)  Transport at Discharge : [facility] (pending need for O2 on dc)

## 2025-05-19 NOTE — QUICK NOTE
Night Progress Note 05/18/25:  Paged regarding sign out from day team. Reported some time after receiving blood he attempted to move to use urinal and became acutely short of breath/wheezing. Received nebulizer, placed on 2L. EKG and hsTrop ordered on previous shift.   I examined patient few hours later, resting comfortably in bed, with some remaining shortness of breath. VSS. Lung sounds clear bilaterally.     Assessment & Plan:  Acute respiratory failure with hypoxia, multifactorial, volume overload  Reports improvement after breathing treatments, do no suspect acute COPD exacerbation currently, continue PTA/PRN nebulizer   Obtain CXR, appears increased pulmonary vascular marking, bibasilar atelectasis. Volume up I&O +2L since admission. IV Lasix 40mg x1    Elevated hsTrop secondary to above  01/2025 NM perfusion with normal LV function, no evidence of ischemia or prior MI  EKG reviewed NSR lateral TWI, improving spontaneously   hsTrop 163>155  Maintain telemetry   If recurs or worsening ACS s/s start heparin

## 2025-05-19 NOTE — PLAN OF CARE
Problem: PAIN - ADULT  Goal: Verbalizes/displays adequate comfort level or baseline comfort level  Description: Interventions:- Encourage patient to monitor pain and request assistance- Assess pain using appropriate pain scale- Administer analgesics based on type and severity of pain and evaluate response- Implement non-pharmacological measures as appropriate and evaluate response- Consider cultural and social influences on pain and pain management- Notify physician/advanced practitioner if interventions unsuccessful or patient reports new pain  Outcome: Progressing     Problem: DISCHARGE PLANNING  Goal: Discharge to home or other facility with appropriate resources  Description: INTERVENTIONS:- Identify barriers to discharge w/patient and caregiver- Arrange for needed discharge resources and transportation as appropriate- Identify discharge learning needs (meds, wound care, etc.)- Arrange for interpretive services to assist at discharge as needed- Refer to Case Management Department for coordinating discharge planning if the patient needs post-hospital services based on physician/advanced practitioner order or complex needs related to functional status, cognitive ability, or social support system  Outcome: Progressing     Problem: SKIN/TISSUE INTEGRITY - ADULT  Goal: Skin Integrity remains intact(Skin Breakdown Prevention)  Description: Assess:-Perform Sae assessment -Clean and moisturize skin -Inspect skin when repositioning, toileting, and assisting with ADLS-Assess under medical devices -Assess extremities for adequate circulation and sensation Bed Management:-Have minimal linens on bed & keep smooth, unwrinkled-Change linens as needed when moist or perspiring-Avoid sitting or lying in one position for more than 2 hours while in bed-Keep HOB at 30 degrees Toileting:-Offer bedside commode-Assess for incontinence -Use incontinent care products after each incontinent episode Activity:-Mobilize patient 3 times  a day-Encourage activity and walks on unit-Encourage or provide ROM exercises -Turn and reposition patient every 2 Hours-Use appropriate equipment to lift or move patient in bed-Instruct/ Assist with weight shifting  when out of bed in chair-Consider limitation of chair time 2 hour intervalsSkin Care:-Avoid use of baby powder, tape, friction and shearing, hot water or constrictive clothing-Relieve pressure over bony prominences Do not massage red bony areasNext Steps:-Teach patient strategies to minimize risks Consider consults to  interdisciplinary teams   Outcome: Progressing  Goal: Incision(s), wounds(s) or drain site(s) healing without S/S of infection  Description: INTERVENTIONS- Assess and document dressing, incision, wound bed, drain sites and surrounding tissue- Provide patient and family education- Perform skin care/dressing changes every shift  Outcome: Progressing  Goal: Pressure injury heals and does not worsen  Description: Interventions:- Implement low air loss mattress or specialty surface (Criteria met)- Apply silicone foam dressing- Instruct/assist with weight shifting every 120 minutes when in chair - Limit chair time to 2 hour intervals- Use special pressure reducing interventions such as turning when in chair - Apply fecal or urinary incontinence containment device - Perform passive or active ROM every 2 hours- Turn and reposition patient & offload bony prominences every 2 hours - Utilize friction reducing device or surface for transfers - Consider consults to  interdisciplinary teams such as wound- Use incontinent care products after each incontinent episode such as wipes- Consider nutrition services referral as needed  Outcome: Progressing     Problem: Nutrition/Hydration-ADULT  Goal: Nutrient/Hydration intake appropriate for improving, restoring or maintaining nutritional needs  Description: Monitor and assess patient's nutrition/hydration status for malnutrition. Collaborate with  interdisciplinary team and initiate plan and interventions as ordered.  Monitor patient's weight and dietary intake as ordered or per policy. Utilize nutrition screening tool and intervene as necessary. Determine patient's food preferences and provide high-protein, high-caloric foods as appropriate. INTERVENTIONS:- Monitor oral intake, urinary output, labs, and treatment plans- Assess nutrition and hydration status and recommend course of action- Evaluate amount of meals eaten- Assist patient with eating if necessary - Allow adequate time for meals- Recommend/ encourage appropriate diets, oral nutritional supplements, and vitamin/mineral supplements- Order, calculate, and assess calorie counts as needed- Recommend, monitor, and adjust tube feedings and TPN/PPN based on assessed needs- Assess need for intravenous fluids- Provide specific nutrition/hydration education as appropriate- Include patient/family/caregiver in decisions related to nutrition  Outcome: Progressing     Problem: Prexisting or High Potential for Compromised Skin Integrity  Goal: Skin integrity is maintained or improved  Description: INTERVENTIONS:  - Identify patients at risk for skin breakdown  - Assess and monitor skin integrity  - Assess and monitor nutrition and hydration status  - Monitor labs   - Assess for incontinence   - Turn and reposition patient  - Assist with mobility/ambulation  - Relieve pressure over bony prominences  - Avoid friction and shearing  - Provide appropriate hygiene as needed including keeping skin clean and dry  - Evaluate need for skin moisturizer/barrier cream  - Collaborate with interdisciplinary team   - Patient/family teaching  - Consider wound care consult   Outcome: Progressing     Problem: INFECTION - ADULT  Goal: Absence or prevention of progression during hospitalization  Description: INTERVENTIONS:  - Assess and monitor for signs and symptoms of infection  - Monitor lab/diagnostic results  - Monitor all  insertion sites, i.e. indwelling lines, tubes, and drains  - Monitor endotracheal if appropriate and nasal secretions for changes in amount and color  - [place] appropriate cooling/warming therapies per order  - Administer medications as ordered  - Instruct and encourage patient and family to use good hand hygiene technique  - Identify and instruct in appropriate isolation precautions for identified infection/condition  Outcome: Progressing  Goal: Absence of fever/infection during neutropenic period  Description: INTERVENTIONS:  - Monitor WBC    Outcome: Progressing     Problem: SAFETY ADULT  Goal: Patient will remain free of falls  Description: INTERVENTIONS:  - Educate patient/family on patient safety including physical limitations  - Instruct patient to call for assistance with activity   - Consult OT/PT to assist with strengthening/mobility   - Keep Call bell within reach  - Keep bed low and locked with side rails adjusted as appropriate  - Keep care items and personal belongings within reach  - Initiate and maintain comfort rounds  - Make Fall Risk Sign visible to staff  - Offer Toileting every 2 Hours, in advance of need  - Initiate/Maintain bed alarm  - Obtain necessary fall risk management equipment:   - Apply yellow socks and bracelet for high fall risk patients  - Consider moving patient to room near nurses station  Outcome: Progressing  Goal: Maintain or return to baseline ADL function  Description: INTERVENTIONS:  -  Assess patient's ability to carry out ADLs; assess patient's baseline for ADL function and identify physical deficits which impact ability to perform ADLs (bathing, care of mouth/teeth, toileting, grooming, dressing, etc.)  - Assess/evaluate cause of self-care deficits   - Assess range of motion  - Assess patient's mobility; develop plan if impaired  - Assess patient's need for assistive devices and provide as appropriate  - Encourage maximum independence but intervene and supervise when  necessary  - Involve family in performance of ADLs  - Assess for home care needs following discharge   - Consider OT consult to assist with ADL evaluation and planning for discharge  - Provide patient education as appropriate  Outcome: Progressing  Goal: Maintains/Returns to pre admission functional level  Description: INTERVENTIONS:  - Perform AM-PAC 6 Click Basic Mobility/ Daily Activity assessment daily.  - Set and communicate daily mobility goal to care team and patient/family/caregiver.   - Collaborate with rehabilitation services on mobility goals if consulted  - Perform Range of Motion 4 times a day.  - Reposition patient every 3 hours.  - Dangle patient 3 times a day  - Stand patient 3 times a day  - Ambulate patient 3 times a day  - Out of bed to chair 3 times a day   - Out of bed for meals 3 times a day  - Out of bed for toileting  - Record patient progress and toleration of activity level   Outcome: Progressing     Problem: Knowledge Deficit  Goal: Patient/family/caregiver demonstrates understanding of disease process, treatment plan, medications, and discharge instructions  Description: Complete learning assessment and assess knowledge base.  Interventions:  - Provide teaching at level of understanding  - Provide teaching via preferred learning methods  Outcome: Progressing     Problem: METABOLIC, FLUID AND ELECTROLYTES - ADULT  Goal: Electrolytes maintained within normal limits  Description: INTERVENTIONS:  - Monitor labs and assess patient for signs and symptoms of electrolyte imbalances  - Administer electrolyte replacement as ordered  - Monitor response to electrolyte replacements, including repeat lab results as appropriate  - Instruct patient on fluid and nutrition as appropriate  Outcome: Progressing     Problem: HEMATOLOGIC - ADULT  Goal: Maintains hematologic stability  Description: INTERVENTIONS  - Assess for signs and symptoms of bleeding or hemorrhage  - Monitor labs  - Administer supportive  blood products/factors as ordered and appropriate  Outcome: Progressing     Problem: CARDIOVASCULAR - ADULT  Goal: Maintains optimal cardiac output and hemodynamic stability  Description: INTERVENTIONS:  - Monitor I/O, vital signs and rhythm  - Monitor for S/S and trends of decreased cardiac output  - Administer and titrate ordered vasoactive medications to optimize hemodynamic stability  - Assess quality of pulses, skin color and temperature  - Assess for signs of decreased coronary artery perfusion  - Instruct patient to report change in severity of symptoms  Outcome: Progressing  Goal: Absence of cardiac dysrhythmias or at baseline rhythm  Description: INTERVENTIONS:  - Continuous cardiac monitoring, vital signs, obtain 12 lead EKG if ordered  - Administer antiarrhythmic and heart rate control medications as ordered  - Monitor electrolytes and administer replacement therapy as ordered  Outcome: Progressing

## 2025-05-19 NOTE — ASSESSMENT & PLAN NOTE
- met SIRS criteria at [facility] with 05/07/25 with tachycardia and leukocytosis   - 5/16: Underwent right partial fifth ray resection: presumed surgical cure  - s/p right CFA SFA endarterectomy/stenting 5/13/2025   - currently on Plavix 75 mg daily (podiatry and vascular following)

## 2025-05-19 NOTE — ASSESSMENT & PLAN NOTE
- met SIRS criteria at [facility] with 05/07/25 with tachycardia and leukocytosis   - 5/16: Underwent right partial fifth ray resection: presumed surgical cure  - s/p right CFA SFA endarterectomy/stenting 5/13/2025   - currently on Plavix 75 mg daily (podiatry and vascular following)

## 2025-05-19 NOTE — PROGRESS NOTES
Podiatry - Progress Note  Patient: Gold Van 79 y.o. male   MRN: [id number]  PCP: Shayne Diaz MD  Unit/Bed#: [address] 217-01 Encounter: [id number]  Date Of Visit: 25    ASSESSMENT:    Gold Van is a 79 y.o. 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  Assume surgical cure of osteomyelitis.   Upon dressing change, wound was noted to have fibrotic base with drainage, green discoloration. Decision was made to not reapply VAC at this time. Discussed repeat washout with patient and wife. They are agreeable to the procedure.   Plan for R foot washout at some point this week pending cardiology clearance.   Preliminary wound cx results with 3+ Serratia marcescens, 3+ Enterococcus faecalis, and 1+ Pseudomonas aeruginosa. Recommend tailoring abx to final wound cx. Continue abx per primary team.   Elevation and offloading on green foam wedges or pillows when non-ambulatory.  Rest of care per primary team.     Weightbearing status: NWB RLE    SUBJECTIVE:     The patient was seen, evaluated, and assessed at bedside today. The patient was awake, alert, and in no acute distress. No acute events overnight. The patient reports pain to right foot. Patient denies N/V/F/chills/SOB/CP.      OBJECTIVE:     Vitals:   /69   Pulse (!) 106   Temp 98.2 °F (36.8 °C) (Oral)   Resp 16   Ht 6' (1.829 m)   Wt 78.1 kg (172 lb 2.9 oz)   SpO2 91%   BMI 23.35 kg/m²     Temp (24hrs), Av.5 °F (36.9 °C), Min:98.1 °F (36.7 °C), Max:98.9 °F (37.2 °C)      Physical Exam:     Lungs: Non labored breathing  Abdomen: Soft, non-tender.  Lower Extremity:  Cardiovascular status at baseline from admission.   "Neurological status at baseline from admission.  Musculoskeletal status at baseline from admission. No calf tenderness noted.     Wound #: 1  Location: right lateral foot  Length 8cm: Width 3cm: Depth 3.5cm:   Deepest Tissue Noted in Base: surgical bone  Probe to Bone: Yes  Peripheral Skin Description: Attached  Granulation: 0% Fibrotic Tissue: 90% Necrotic Tissue: 10%   Drainage Amount: moderate serous  Signs of Infection: Yes cellulitis    Clinical Images 05/19/25:      Additional Data:     Labs:    Results from last 7 days   Lab Units 05/19/25  0548   WBC Thousand/uL 11.33*   HEMOGLOBIN g/dL 9.0*   HEMATOCRIT % 27.6*   PLATELETS Thousands/uL 399*     Results from last 7 days   Lab Units 05/19/25  0548 05/17/25  0542 05/16/25  0545 05/14/25  0436 05/13/25  1512   POTASSIUM mmol/L 4.1   < > 4.0   < >  --    CHLORIDE mmol/L 99   < > 103   < >  --    CO2 mmol/L 28   < > 25   < >  --    CO2, I-STAT mmol/L  --   --   --   --  22   BUN mg/dL 9   < > 14   < >  --    CREATININE mg/dL 0.98   < > 0.87   < >  --    CALCIUM mg/dL 8.7   < > 8.1*   < >  --    ALK PHOS U/L  --   --  51   < >  --    ALT U/L  --   --  4*   < >  --    AST U/L  --   --  12*   < >  --    GLUCOSE, ISTAT mg/dl  --   --   --   --  262*    < > = values in this interval not displayed.           * I Have Reviewed All Lab Data Listed Above.    Recent Cultures (last 7 days):     Results from last 7 days   Lab Units 05/16/25  1337   GRAM STAIN RESULT  Rare Gram positive cocci in pairs*  No polys seen*   WOUND CULTURE  3+ Growth of Serratia marcescens*  3+ Growth of Enterococcus faecalis*     Results from last 7 days   Lab Units 05/16/25  1337   ANAEROBIC CULTURE  No anaerobes isolated       Imaging: I have personally reviewed pertinent films in PACS  EKG, Pathology, and Other Studies: I have personally reviewed pertinent reports.    ** Please Note: Portions of the record may have been created with voice recognition software. Occasional wrong word or \"sound " "a like\" substitutions may have occurred due to the inherent limitations of voice recognition software. Read the chart carefully and recognize, using context, where substitutions have occurred. **      "

## 2025-05-19 NOTE — PROGRESS NOTES
Progress Note - Vascular Surgery   Name: Gold Van 79 y.o. 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 Pt admitted and started on ABX. Pt was transferred to [facility] on 5/11/25 for vascular surgical intervention.     Vascular surgery consulted for worsening R 5th met wound in the setting of PAD. CTA abd shows L CFA/SFA stenosis w/ focal occlusions and AT/peroneal occlusions w/ reconstitution. LEAD shows R PTA occlusion and R RIC: 0.42/15/16.    Pt is S/P R CFA/SFA endarterectomy w/ bovine pericardial patch, DCB and stenting of SFA, and angio of AT on 5/13.   Pt is S/P R partial 5th ray resection by podiatry on 5/16.    Diagnostics:  -5/6/25 CTA abd w/ runoff: B/L EDY/EIA/IIA patent w/ atherosclerosis. Right: Focal high-grade stenosis of distal R CFA and diffuse atherosclerotic disease of R SFA resulting in moderate to severe stenoses with focal near complete occlusions at the proximal and distal segments. Short segment occlusions of proximal R AT and peroneal arteries with reconstitution. R PTA is occluded. Left: Occluded L SFA with reconstitution. L YUNG and PTA occluded. One-vessel runoff LLE through peroneal artery.  -4/18/25 LEAD: Right: >75% prox SFA and 50-75% dSFA stenosis. Distal PTA occlusion. R RIC: 0.42/15/16. Left: PTA and YUNG occlusions. L RIC: 1.06/85/45.   -4/17/25 MRI R foot: Possible early OM in R 5th met    Plan:  -R 5th met wound (+) OM in the setting of PAD  -S/P R CFA/SFA endarterectomy w/ bovine pericardial patch, DCB and stenting of SFA, and angio of AT on 5/13 (POD #6)  -Continue  daily incision care to R groin by nursing   -Continue plavix (new SFA stents) and lipitor for medical management of PAD   -Continue eliquis for hx of afib  -Podiatry following, S/P R partial 5th ray resection on 5/16  -Continue medical management per primary team  -Pt is stable for discharge from a vascular surgery standpoint  -Will continue to follow peripherally while pt remains hospitalized  -Plan follow up outpt in vascular surgery office; appt scheduled for 6/5/25  -Will discuss w/ Dr. Hassan  Osteomyelitis (HCC)        Subjective : Pt was with his wife in the room and was enjoying breakfast. He has mild pain in the plantar surface of the R foot s/p 5th ray amp 5/16 that is tolerated with pain medications. Pt has no other concerns today. He denies CP, SOB, cough, fever, chills, lightheadedness, N/V, abdominal pain, and motor/sensation changed of the BLE.     Objective :  Temp:  [98.1 °F (36.7 °C)-98.9 °F (37.2 °C)] 98.2 °F (36.8 °C)  HR:  [] 106  BP: (107-139)/(61-73) 130/69  Resp:  [14-18] 16  SpO2:  [90 %-97 %] 91 %     I/O         05/17 0701  05/18 0700 05/18 0701  05/19 0700 05/19 0701  05/20 0700    P.O. 120 240     I.V. (mL/kg) 1145 (14.6)      Blood  377.5     Total Intake(mL/kg) 1265 (16.2) 617.5 (7.9)     Urine (mL/kg/hr) 530 (0.3) 2320 (1.2)     Drains       Stool 0 0     Total Output 530 2320     Net +735 -1702.5            Unmeasured Urine Occurrence 1 x      Unmeasured Stool Occurrence 1 x 2 x             Physical Exam  Vitals and nursing note reviewed.   Constitutional:       General: He is not in acute distress.     Appearance: He is well-developed.   HENT:      Head: Normocephalic and atraumatic.     Eyes:      Conjunctiva/sclera: Conjunctivae normal.       Cardiovascular:      Rate and Rhythm: Normal rate and regular rhythm.      Heart sounds: Normal heart sounds. No murmur heard.     Comments: L PT/DP signals on doppler.   R foot pulse exam limited by dressing.   Pulmonary:      Effort:  Pulmonary effort is normal. No respiratory distress.      Breath sounds: Normal breath sounds.   Abdominal:      Palpations: Abdomen is soft.      Tenderness: There is no abdominal tenderness.     Musculoskeletal:         General: No swelling.      Cervical back: Neck supple.     Skin:     General: Skin is warm and dry.      Capillary Refill: Capillary refill takes less than 2 seconds.      Comments: R groin incision is C/D/I, painted with betadine, and gauze dressing applied. No erythema, edema, leakage, pus, or warmth.   R foot dressing is C/D/I, prevalon boot in place.      Neurological:      General: No focal deficit present.      Mental Status: He is alert and oriented to person, place, and time.     Psychiatric:         Mood and Affect: Mood normal.         Thought Content: Thought content normal.         Judgment: Judgment normal.         R groin incision       Lab Results: I have reviewed the following results:  CBC with diff:   Lab Results   Component Value Date    WBC 11.33 (H) 05/19/2025    HGB 9.0 (L) 05/19/2025    HCT 27.6 (L) 05/19/2025    MCV 91 05/19/2025     (H) 05/19/2025    RBC 3.05 (L) 05/19/2025    MCH 29.5 05/19/2025    MCHC 32.6 05/19/2025    RDW 15.5 (H) 05/19/2025    MPV 9.3 05/19/2025    NRBC 0 05/07/2025      BMP/CMP:  Lab Results   Component Value Date    SODIUM 134 (L) 05/19/2025    K 4.1 05/19/2025    K 4.5 04/14/2015    CL 99 05/19/2025     04/14/2015    CO2 28 05/19/2025    CO2 22 05/13/2025    ANIONGAP 8 04/14/2015    BUN 9 05/19/2025    BUN 10 04/14/2015    CREATININE 0.98 05/19/2025    CREATININE 0.86 04/14/2015    GLUCOSE 262 (H) 05/13/2025    GLUCOSE 179 (H) 04/14/2015    CALCIUM 8.7 05/19/2025    CALCIUM 8.9 04/14/2015    AST 12 (L) 05/16/2025    AST 12 08/12/2014    ALT 4 (L) 05/16/2025    ALT 19 08/12/2014    ALKPHOS 51 05/16/2025    ALKPHOS 101 08/12/2014    PROT 6.8 08/12/2014    BILITOT 0.6 08/12/2014    EGFR 73 05/19/2025     Coags:   Lab Results   Component  Value Date    PTT 53 (H) 05/15/2025    INR 1.14 05/07/2025      Blood Culture:   Lab Results   Component Value Date    BLOODCX No Growth After 5 Days. 05/07/2025    BLOODCX No Growth After 5 Days. 05/07/2025     Urinalysis:   Lab Results   Component Value Date    COLORU Yellow 05/07/2025    COLORU Yellow 08/12/2014    CLARITYU Clear 05/07/2025    CLARITYU Clear 08/12/2014    SPECGRAV 1.010 05/07/2025    SPECGRAV 1.025 08/12/2014    PHUR 6.0 05/07/2025    PHUR 6.0 06/14/2016    PHUR 6.0 08/12/2014    LEUKOCYTESUR Negative 05/07/2025    LEUKOCYTESUR Negative 08/12/2014    NITRITE Negative 05/07/2025    NITRITE Negative 08/12/2014    PROTEINUA Negative 08/12/2014    GLUCOSEU Negative 05/07/2025    GLUCOSEU 100 (1/10%) (A) 08/12/2014    KETONESU Negative 05/07/2025    KETONESU Negative 08/12/2014    BILIRUBINUR Negative 05/07/2025    BILIRUBINUR Negative 08/12/2014    BLOODU 1+ (A) 05/07/2025    BLOODU Trace (A) 08/12/2014      Wound [place]:   Lab Results   Component Value Date    WOUNDCULT 3+ Growth of Serratia marcescens (A) 05/16/2025    WOUNDCULT 3+ Growth of Enterococcus faecalis (A) 05/16/2025

## 2025-05-19 NOTE — ASSESSMENT & PLAN NOTE
Follows with [facility] cardiology: Most recent note 4/25 was reviewed  Currently in sinus rhythm  Continue atenolol 25 mg daily.  Anticoagulation: Apixaban was on hold with heparin infusion.  Apixaban has been restarted for paroxysmal atrial fibrillation in the setting of previous stroke

## 2025-05-19 NOTE — ASSESSMENT & PLAN NOTE
"Chest x-ray with incidental finding of \"nodular opacity at left base\"  Outpatient chest x-ray in 6 weeks  "

## 2025-05-20 ENCOUNTER — TELEPHONE (OUTPATIENT)
Age: 80
End: 2025-05-20

## 2025-05-20 LAB
ANION GAP SERPL CALCULATED.3IONS-SCNC: 9 MMOL/L (ref 4–13)
BACTERIA WND AEROBE CULT: ABNORMAL
BASOPHILS # BLD AUTO: 0.07 THOUSANDS/ÂΜL (ref 0–0.1)
BASOPHILS NFR BLD AUTO: 1 % (ref 0–1)
BUN SERPL-MCNC: 10 MG/DL (ref 5–25)
CALCIUM SERPL-MCNC: 8.5 MG/DL (ref 8.4–10.2)
CHLORIDE SERPL-SCNC: 99 MMOL/L (ref 96–108)
CO2 SERPL-SCNC: 26 MMOL/L (ref 21–32)
CREAT SERPL-MCNC: 0.95 MG/DL (ref 0.6–1.3)
EOSINOPHIL # BLD AUTO: 0.17 THOUSAND/ÂΜL (ref 0–0.61)
EOSINOPHIL NFR BLD AUTO: 1 % (ref 0–6)
ERYTHROCYTE [DISTWIDTH] IN BLOOD BY AUTOMATED COUNT: 16.1 % (ref 11.6–15.1)
GFR SERPL CREATININE-BSD FRML MDRD: 75 ML/MIN/1.73SQ M
GLUCOSE SERPL-MCNC: 148 MG/DL (ref 65–140)
GLUCOSE SERPL-MCNC: 209 MG/DL (ref 65–140)
GLUCOSE SERPL-MCNC: 267 MG/DL (ref 65–140)
GLUCOSE SERPL-MCNC: 70 MG/DL (ref 65–140)
GLUCOSE SERPL-MCNC: 87 MG/DL (ref 65–140)
GRAM STN SPEC: ABNORMAL
GRAM STN SPEC: ABNORMAL
HCT VFR BLD AUTO: 30.2 % (ref 36.5–49.3)
HGB BLD-MCNC: 9.6 G/DL (ref 12–17)
IMM GRANULOCYTES # BLD AUTO: 0.17 THOUSAND/UL (ref 0–0.2)
IMM GRANULOCYTES NFR BLD AUTO: 1 % (ref 0–2)
LYMPHOCYTES # BLD AUTO: 2.44 THOUSANDS/ÂΜL (ref 0.6–4.47)
LYMPHOCYTES NFR BLD AUTO: 19 % (ref 14–44)
MCH RBC QN AUTO: 29.3 PG (ref 26.8–34.3)
MCHC RBC AUTO-ENTMCNC: 31.8 G/DL (ref 31.4–37.4)
MCV RBC AUTO: 92 FL (ref 82–98)
MONOCYTES # BLD AUTO: 1.24 THOUSAND/ÂΜL (ref 0.17–1.22)
MONOCYTES NFR BLD AUTO: 9 % (ref 4–12)
NEUTROPHILS # BLD AUTO: 9.11 THOUSANDS/ÂΜL (ref 1.85–7.62)
NEUTS SEG NFR BLD AUTO: 69 % (ref 43–75)
NRBC BLD AUTO-RTO: 0 /100 WBCS
PLATELET # BLD AUTO: 520 THOUSANDS/UL (ref 149–390)
PMV BLD AUTO: 9.1 FL (ref 8.9–12.7)
POTASSIUM SERPL-SCNC: 3.8 MMOL/L (ref 3.5–5.3)
RBC # BLD AUTO: 3.28 MILLION/UL (ref 3.88–5.62)
SODIUM SERPL-SCNC: 134 MMOL/L (ref 135–147)
WBC # BLD AUTO: 13.2 THOUSAND/UL (ref 4.31–10.16)

## 2025-05-20 PROCEDURE — 99232 SBSQ HOSP IP/OBS MODERATE 35: CPT | Performed by: INTERNAL MEDICINE

## 2025-05-20 PROCEDURE — 85025 COMPLETE CBC W/AUTO DIFF WBC: CPT | Performed by: INTERNAL MEDICINE

## 2025-05-20 PROCEDURE — 99232 SBSQ HOSP IP/OBS MODERATE 35: CPT

## 2025-05-20 PROCEDURE — NC001 PR NO CHARGE: Performed by: PODIATRIST

## 2025-05-20 PROCEDURE — 82948 REAGENT STRIP/BLOOD GLUCOSE: CPT

## 2025-05-20 PROCEDURE — G0545 PR INHERENT VISIT TO INPT: HCPCS | Performed by: INTERNAL MEDICINE

## 2025-05-20 PROCEDURE — 99024 POSTOP FOLLOW-UP VISIT: CPT | Performed by: NURSE PRACTITIONER

## 2025-05-20 PROCEDURE — 80048 BASIC METABOLIC PNL TOTAL CA: CPT | Performed by: INTERNAL MEDICINE

## 2025-05-20 PROCEDURE — 99223 1ST HOSP IP/OBS HIGH 75: CPT | Performed by: INTERNAL MEDICINE

## 2025-05-20 RX ADMIN — CEFAZOLIN SODIUM 2000 MG: 2 SOLUTION INTRAVENOUS at 06:01

## 2025-05-20 RX ADMIN — OXYCODONE HYDROCHLORIDE 10 MG: 10 TABLET ORAL at 05:56

## 2025-05-20 RX ADMIN — FUROSEMIDE 40 MG: 10 INJECTION, SOLUTION INTRAVENOUS at 09:01

## 2025-05-20 RX ADMIN — PIPERACILLIN AND TAZOBACTAM 4.5 G: 36; 4.5 INJECTION, POWDER, LYOPHILIZED, FOR SOLUTION INTRAVENOUS at 23:09

## 2025-05-20 RX ADMIN — OXYCODONE HYDROCHLORIDE 10 MG: 10 TABLET ORAL at 21:40

## 2025-05-20 RX ADMIN — PREDNISONE 5 MG: 5 TABLET ORAL at 15:48

## 2025-05-20 RX ADMIN — BUDESONIDE, GLYCOPYRROLATE, AND FORMOTEROL FUMARATE 2 PUFF: 160; 9; 4.8 AEROSOL, METERED RESPIRATORY (INHALATION) at 09:02

## 2025-05-20 RX ADMIN — ACETAMINOPHEN 975 MG: 325 TABLET, FILM COATED ORAL at 21:52

## 2025-05-20 RX ADMIN — CLOPIDOGREL BISULFATE 75 MG: 75 TABLET, FILM COATED ORAL at 09:00

## 2025-05-20 RX ADMIN — FUROSEMIDE 40 MG: 10 INJECTION, SOLUTION INTRAVENOUS at 15:45

## 2025-05-20 RX ADMIN — GLIPIZIDE 10 MG: 5 TABLET, FILM COATED, EXTENDED RELEASE ORAL at 06:01

## 2025-05-20 RX ADMIN — APIXABAN 5 MG: 5 TABLET, FILM COATED ORAL at 09:00

## 2025-05-20 RX ADMIN — INSULIN LISPRO 2 UNITS: 100 INJECTION, SOLUTION INTRAVENOUS; SUBCUTANEOUS at 21:42

## 2025-05-20 RX ADMIN — ATORVASTATIN CALCIUM 40 MG: 40 TABLET, FILM COATED ORAL at 17:33

## 2025-05-20 RX ADMIN — LISINOPRIL 20 MG: 20 TABLET ORAL at 09:00

## 2025-05-20 RX ADMIN — ATENOLOL 25 MG: 25 TABLET ORAL at 09:00

## 2025-05-20 RX ADMIN — PIPERACILLIN AND TAZOBACTAM 4.5 G: 36; 4.5 INJECTION, POWDER, LYOPHILIZED, FOR SOLUTION INTRAVENOUS at 11:14

## 2025-05-20 RX ADMIN — ACETAMINOPHEN 975 MG: 325 TABLET, FILM COATED ORAL at 13:40

## 2025-05-20 RX ADMIN — ACETAMINOPHEN 975 MG: 325 TABLET, FILM COATED ORAL at 05:55

## 2025-05-20 RX ADMIN — MORPHINE SULFATE 2 MG: 2 INJECTION, SOLUTION INTRAMUSCULAR; INTRAVENOUS at 06:57

## 2025-05-20 RX ADMIN — FAMOTIDINE 20 MG: 20 TABLET, FILM COATED ORAL at 17:33

## 2025-05-20 RX ADMIN — BUDESONIDE, GLYCOPYRROLATE, AND FORMOTEROL FUMARATE 2 PUFF: 160; 9; 4.8 AEROSOL, METERED RESPIRATORY (INHALATION) at 21:52

## 2025-05-20 RX ADMIN — INSULIN LISPRO 1 UNITS: 100 INJECTION, SOLUTION INTRAVENOUS; SUBCUTANEOUS at 17:34

## 2025-05-20 RX ADMIN — LISINOPRIL 20 MG: 20 TABLET ORAL at 21:41

## 2025-05-20 RX ADMIN — APIXABAN 5 MG: 5 TABLET, FILM COATED ORAL at 17:33

## 2025-05-20 RX ADMIN — Medication 1 TABLET: at 09:01

## 2025-05-20 RX ADMIN — MONTELUKAST 10 MG: 10 TABLET, FILM COATED ORAL at 21:40

## 2025-05-20 RX ADMIN — AMLODIPINE BESYLATE 5 MG: 5 TABLET ORAL at 09:00

## 2025-05-20 RX ADMIN — OXYCODONE HYDROCHLORIDE 10 MG: 10 TABLET ORAL at 13:40

## 2025-05-20 RX ADMIN — PIPERACILLIN AND TAZOBACTAM 4.5 G: 36; 4.5 INJECTION, POWDER, LYOPHILIZED, FOR SOLUTION INTRAVENOUS at 15:45

## 2025-05-20 RX ADMIN — METRONIDAZOLE 500 MG: 500 TABLET ORAL at 09:00

## 2025-05-20 NOTE — ASSESSMENT & PLAN NOTE
- met SIRS criteria at [facility] with 05/07/25 with tachycardia and leukocytosis   - 5/16: Underwent right partial fifth ray resection: presumed surgical cure  - s/p right CFA SFA endarterectomy/stenting 5/13/2025   - currently on Plavix 75 mg daily (podiatry and vascular following)

## 2025-05-20 NOTE — PROGRESS NOTES
Progress Note - Cardiology   Name: Gold Van 79 y.o. male I MRN: [id number]  Unit/Bed#: [place] [address] -01 I Date of Admission: 5/11/2025   Date of Service: 5/20/2025 I Hospital Day: 9        TODAY (05/20/25):   Would plan for IV lasix 40 mg BID today. He is agreeable.  Monitor I/O's closely, unable to standing weights as he is non-weightbearing   Monitor BMP, keep K > 4 Will check Mg tomorrow AM.   Continue amlodipine, Eliquis, lisinopril, and atenolol  Continue Plavix, vascular and podiatry following for osteomyelitis and PAD      Assessment & Plan  Sepsis without acute organ dysfunction (HCC)  Ulcer of right foot with fat layer exposed secondary to osteomyelitis  Severe peripheral arterial disease (HCC)  - met SIRS criteria at [facility] with 05/07/25 with tachycardia and leukocytosis   - 5/16: Underwent right partial fifth ray resection: presumed surgical cure  - s/p right CFA SFA endarterectomy/stenting 5/13/2025   - currently on Plavix 75 mg daily (podiatry and vascular following)  Chronic heart failure with preserved ejection fraction (HFpEF) ([facility])  - TTE 05/20/25: LVEF 50%, global longitudinal strain -11%, grade I DD, anteroseptal and apical septal hypokinesis, mild LAE, possible prominent eustachian valve or partial cor triatriatum sheryl, mild AR, aortic valve sclerosis, mild MAC, mild MR, PASP 38.00 mmHg  - TTE 10/01/24: LVEF 50%, mild hypokinesia of the posterior wall, grade I DD, mild AR   - BNP pending   - CXR 05/18/25: moderate interstitial edema, nodular opacity at the left base not visible on prior studies   - Neurohormonal Blockade:   -- beta blocker: atenolol 25 mg daily  -- ACE/ARB/ARNi: lisinopril 5 mg daily   -- diuretic: none   -- inpt diuretic: IV lasix 40 mg BID (started on 05/19/25)--> pt refused both doses of IV lasix yesterday  Essential hypertension  - BP overall stable during admission   - home rx: amlodipine 5 mg daily, lisinopril 20 mg daily, atenolol 25 mg daily   PAF  (paroxysmal atrial fibrillation) (HCC)  - Zio 01/06/25: predominant NSR with 1 NSVT lasting 5 beats, 2% AF/flutter burden  - rate control: atenolol 25 mg daily   - AC: Eliquis 5 mg BID  Troponin I above reference range  - troponin trend: 163 > 155 > 193 > 237  - EKG shows nonspecific ST and T wave abnormalities   Type 2 diabetes mellitus with complication, without long-term current use of insulin ([facility])  Lab Results   Component Value Date    HGBA1C 9.3 (H) 04/09/2025     Postoperative anemia  - baseline ~ 10-12 with drop to 7 after surgery   - s/p 1 unit of PRBCs 05/18/25   Mild intermittent asthma without complication    Subjective:  Pt seen and examined at bedside. States his pain was very bad this AM but after morphine it has subsided a bit. Explained the importance of the lasix as he has fluids in his lungs and it will help to remove the fluid especially prior to going back to the OR. He is now agreeable to the lasix.    Vitals:  Vitals:    05/19/25 1450 05/20/25 0600   Weight: 78.1 kg (172 lb 2.9 oz) 84.5 kg (186 lb 4.6 oz)   ,  Vitals:    05/20/25 0307 05/20/25 0600 05/20/25 0753 05/20/25 0858   BP: 120/57  119/60 131/67   BP Location:       Pulse: 71  83 98   Resp:       Temp: 98.9 °F (37.2 °C)  98.8 °F (37.1 °C)    TempSrc:       SpO2: 90%  (!) 87% 92%   Weight:  84.5 kg (186 lb 4.6 oz)     Height:           Exam:  Physical Exam  Vitals and nursing note reviewed.   Constitutional:       General: He is not in acute distress.     Appearance: Normal appearance. He is not ill-appearing.   HENT:      Head: Normocephalic and atraumatic.      Nose: Nose normal.      Mouth/Throat:      Mouth: Mucous membranes are moist.     Eyes:      General: No scleral icterus.    Neck:      Vascular: No JVD.     Cardiovascular:      Rate and Rhythm: Normal rate and regular rhythm.      Pulses:           Radial pulses are 2+ on the right side and 2+ on the left side.      Heart sounds: No murmur heard.  Pulmonary:      Effort:  Pulmonary effort is normal. No respiratory distress.      Breath sounds: No stridor. Rales (predominatley in rd bases of the lunfs) present. No wheezing or rhonchi.   Abdominal:      General: Abdomen is flat.     Musculoskeletal:         General: No swelling. Normal range of motion.      Cervical back: Normal range of motion.      Right lower leg: Edema (right gfoot wrapped in ACE did nto inspect wounds) present.      Left lower leg: No edema.     Skin:     General: Skin is warm and dry.      Capillary Refill: Capillary refill takes less than 2 seconds.     Neurological:      Mental Status: He is alert. Mental status is at baseline.     Psychiatric:         Thought Content: Thought content normal.                 Telemetry:       Normal sinus rhythm, , Heart Rate average 90 bpm    Medications:  Current Medications[1]    Labs/Data:        Results from last 7 days   Lab Units 05/20/25  0603 05/19/25  0548 05/18/25  0440   WBC Thousand/uL 13.20* 11.33* 9.66   HEMOGLOBIN g/dL 9.6* 9.0* 7.0*   HEMATOCRIT % 30.2* 27.6* 21.9*   PLATELETS Thousands/uL 520* 399* 307     Results from last 7 days   Lab Units 05/20/25  0603 05/19/25  0548 05/18/25  0440   POTASSIUM mmol/L 3.8 4.1 4.0   CHLORIDE mmol/L 99 99 103   CO2 mmol/L 26 28 26   BUN mg/dL 10 9 10   CREATININE mg/dL 0.95 0.98 0.83     Laurence Payne PA-C          [1]   Current Facility-Administered Medications:     acetaminophen (TYLENOL) tablet 975 mg, 975 mg, Oral, Q8H SANDRA, Bean Patel, DO, 975 mg at 05/20/25 0555    albuterol (PROVENTIL HFA,VENTOLIN HFA) inhaler 1 puff, 1 puff, Inhalation, Q4H PRN, Bean Patel, DO, 1 puff at 05/18/25 1816    amLODIPine (NORVASC) tablet 5 mg, 5 mg, Oral, BID, Bean Patel, DO, 5 mg at 05/20/25 0900    apixaban (ELIQUIS) tablet 5 mg, 5 mg, Oral, BID, Bean Patel, DO, 5 mg at 05/20/25 0900    atenolol (TENORMIN) tablet 25 mg, 25 mg, Oral, Daily, Bean Sweeney DO, 25 mg at 05/20/25 0900    atorvastatin (LIPITOR) tablet 40 mg, 40  mg, Oral, QPM, Bean Sweeney DO, 40 mg at 05/19/25 1715    Budeson-Glycopyrrol-Formoterol 160-9-4.8 MCG/ACT AERO 2 puff, 2 puff, Inhalation, BID, Bean Sweeney DO, 2 puff at 05/20/25 0902    ceFAZolin (ANCEF) IVPB (premix in dextrose) 2,000 mg 50 mL, 2,000 mg, Intravenous, Q8H, Bean Sweeney DO, Last Rate: 100 mL/hr at 05/20/25 0601, 2,000 mg at 05/20/25 0601    clopidogrel (PLAVIX) tablet 75 mg, 75 mg, Oral, Daily, Bean Sweeney DO, 75 mg at 05/20/25 0900    docusate sodium (COLACE) capsule 100 mg, 100 mg, Oral, BID, Zulema Pitt MD    [Held by provider] famotidine (PEPCID) tablet 20 mg, 20 mg, Oral, BID, Bean Sweeney DO, 20 mg at 05/18/25 1723    furosemide (LASIX) injection 40 mg, 40 mg, Intravenous, BID (diuretic), Zulema Pitt MD, 40 mg at 05/20/25 0901    glipiZIDE (GLUCOTROL XL) 24 hr tablet 10 mg, 10 mg, Oral, BID AC, Bean Sweeney DO, 10 mg at 05/20/25 0601    insulin lispro (HumALOG/ADMELOG) 100 units/mL subcutaneous injection 1-5 Units, 1-5 Units, Subcutaneous, TID AC, 1 Units at 05/19/25 1714 **AND** Fingerstick Glucose (POCT), , , TID AC, Bean Sweeney DO    insulin lispro (HumALOG/ADMELOG) 100 units/mL subcutaneous injection 1-5 Units, 1-5 Units, Subcutaneous, HS, Bean Sweeney DO, 1 Units at 05/18/25 2334    levalbuterol (XOPENEX) inhalation solution 1.25 mg, 1.25 mg, Nebulization, Q6H PRN, Bean Sweeney DO    lidocaine (LIDODERM) 5 % patch 1 patch, 1 patch, Topical, Daily, Bean Sweeney DO, 1 patch at 05/18/25 2333    lisinopril (ZESTRIL) tablet 20 mg, 20 mg, Oral, BID, Bean Sweeney DO, 20 mg at 05/20/25 0900    [Held by provider] metFORMIN (GLUCOPHAGE-XR) 24 hr tablet 500 mg, 500 mg, Oral, Daily With Dinner, Bean Sweeney DO, 500 mg at 05/18/25 1723    metroNIDAZOLE (FLAGYL) tablet 500 mg, 500 mg, Oral, Q12H SANDRA, Bean Sweeney, DO, 500 mg at 05/20/25 0900    montelukast (SINGULAIR) tablet 10 mg, 10 mg, Oral, HS, Bean Sweeney DO, 10 mg at 05/19/25 2158    morphine injection 2  mg, 2 mg, Intravenous, Q4H PRN, Bean Patel, DO, 2 mg at 05/20/25 0657    multivitamin-minerals (CENTRUM) tablet 1 tablet, 1 tablet, Oral, Daily, Bean Patel, DO, 1 tablet at 05/20/25 0901    ondansetron (ZOFRAN) injection 4 mg, 4 mg, Intravenous, Q6H PRN, Bean Patel, DO, 4 mg at 05/16/25 1114    oxyCODONE (ROXICODONE) IR tablet 5 mg, 5 mg, Oral, Q4H PRN, 5 mg at 05/18/25 0257 **OR** oxyCODONE (ROXICODONE) immediate release tablet 10 mg, 10 mg, Oral, Q4H PRN, Bean Patel, DO, 10 mg at 05/20/25 0556    polyethylene glycol (MIRALAX) packet 17 g, 17 g, Oral, Daily PRN, Zulema Pitt MD    predniSONE tablet 5 mg, 5 mg, Oral, Daily, Bean Patel, DO, 5 mg at 05/18/25 0800    senna (SENOKOT) tablet 8.6 mg, 1 tablet, Oral, Daily, Bean Sweeney, DO, 8.6 mg at 05/18/25 0800

## 2025-05-20 NOTE — CASE MANAGEMENT
Case Management Discharge Planning Note    Patient name Gold Van  Location [place] [address] /[facility] 2 M* MRN [id number]  : 1945 Date 2025       Current Admission Date: 2025  Current Admission Diagnosis:Sepsis without acute organ dysfunction ([facility])   Patient Active Problem List    Diagnosis Date Noted    Osteomyelitis ([facility]) 2025    Troponin I above reference range 2025    Volume overload 2025    Postoperative anemia 2025    Pulmonary hypertension ([facility]) 2025    Sepsis without acute organ dysfunction ([facility]) 2025    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 ([facility]) 2025    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 ([facility]) 2025    PAF (paroxysmal atrial fibrillation) ([facility]) 2025    Chronic heart failure with preserved ejection fraction (HFpEF) ([facility]) 2025    Ulcer of right foot with fat layer exposed secondary to osteomyelitis 2025    Severe peripheral arterial disease ([facility]) 2024    Moderate protein-calorie malnutrition ([facility]) 10/09/2024    Gastroesophageal reflux disease without esophagitis 10/04/2024    Impaired mobility and activities of daily living 10/04/2024    Neuropathy 10/04/2024    Foot drop, left 10/04/2024    Abnormal echocardiogram 10/03/2024    CVA (cerebrovascular accident) ([facility]) 10/02/2024    Dysmetria 10/01/2024    COVID-19 2024    Acute respiratory insufficiency 2024    Shortness of breath 2024    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    History of pneumothorax 2024    Acute respiratory failure with hypoxia ([facility]) 2024    Non-recurrent bilateral inguinal hernia without obstruction or gangrene 2024    Pruritus 2024    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 2021    Hyponatremia 2021    Lung nodule 2021    Type 2 diabetes mellitus with complication, without long-term current use of insulin  ([facility]) 10/23/2017    Essential hypertension 10/23/2017    Mild intermittent asthma without complication 10/23/2017    Mixed hyperlipidemia 10/23/2017      LOS (days): 9  Geometric Mean LOS (GMLOS) (days): 9.6  Days to GMLOS:0.9     OBJECTIVE:  Risk of Unplanned Readmission Score: 29.77         Current admission status: Inpatient   Preferred Pharmacy:   [facility] - [place], [place] - [address]  [address] [facility] [address]  Phone: [phone] Fax: [phone]    [facility] - [place], [place] - [address]  [address]  [facility] [address]  Phone: [phone] Fax: [phone]    [facility] ([facility]) #[id number] [place], [place] - [address] [address]  Phone: [phone] Fax: [phone]    Primary Care Provider: Shayne Diaz MD    Primary Insurance: [facility]  Secondary Insurance:     DISCHARGE DETAILS:    Discharge planning discussed with:: Pt and pt's wife Mya  Freedom of Choice: Yes  Comments - Freedom of Choice: Discussed 1 facility currently able to accept pt- requested further referral to [facility]'s with that being their 1st choice since the [facility] inability to accept along with [facility].  Referral made as requested.  CM contacted family/caregiver?: Yes  Were Treatment Team discharge recommendations reviewed with patient/caregiver?: Yes  Did patient/caregiver verbalize understanding of patient care needs?: Yes       Contacts  Patient Contacts: Mya Van  Relationship to Patient:: Family  Contact Method: In Person  Reason/Outcome: Discharge Planning, Referral                        Treatment Team Recommendation: Short Term Rehab  Discharge Destination Plan:: Short Term Rehab (Location TBD)  Transport at Discharge : Stretcher van, BLS Ambulance (Pending need for O2)

## 2025-05-20 NOTE — PROGRESS NOTES
Progress Note - Vascular Surgery   Name: Gold Van 79 y.o. 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 Pt admitted and started on ABX. Pt was transferred to [facility] on 5/11/25 for vascular surgical intervention.     Vascular surgery consulted for worsening R 5th met wound in the setting of PAD. CTA abd shows L CFA/SFA stenosis w/ focal occlusions and AT/peroneal occlusions w/ reconstitution. LEAD shows R PTA occlusion and R RIC: 0.42/15/16.    -Pt is S/P R CFA/SFA endarterectomy w/ bovine pericardial patch, DCB and stenting of SFA, and angio of AT on 5/13.   -Pt is S/P R partial 5th ray resection by podiatry on 5/16.    Diagnostics:  -5/6/25 CTA abd w/ runoff: B/L EDY/EIA/IIA patent w/ atherosclerosis. Right: Focal high-grade stenosis of distal R CFA and diffuse atherosclerotic disease of R SFA resulting in moderate to severe stenoses with focal near complete occlusions at the proximal and distal segments. Short segment occlusions of proximal R AT and peroneal arteries with reconstitution. R PTA is occluded. Left: Occluded L SFA with reconstitution. L YUNG and PTA occluded. One-vessel runoff LLE through peroneal artery.  -4/18/25 LEAD: Right: >75% prox SFA and 50-75% dSFA stenosis. Distal PTA occlusion. R RIC: 0.42/15/16. Left: PTA and YUNG occlusions. L RIC: 1.06/85/45.   -4/17/25 MRI R foot: Possible early OM in R 5th met    Plan:  -R 5th met wound (+) OM in the setting of PAD  -S/P R CFA/SFA endarterectomy w/ bovine pericardial patch, DCB and stenting of SFA, and angio of AT on 5/13 (POD #7)  -Continue  daily incision care to R groin by nursing   -Continue plavix (new SFA stents) and lipitor for medical management of PAD   -Continue eliquis for hx of afib  -Podiatry following, S/P R partial 5th ray resection on 5/16  -Cardiology following for hx of CHF w/ volume overload in the setting of multiple surgeries; input appreciated   -Continue medical management per primary team  -Case management following w/ plans for STR  -Pt is stable for discharge from a vascular surgery standpoint  -Will continue to follow peripherally while pt remains hospitalized  -Plan follow up outpt in vascular surgery office; appt scheduled for 6/5/25  -Will discuss w/ Dr. Cee    Subjective : Pt seen for exam while lying in bed; NAD. Pt reports increased pain in R foot wound and otherwise, denies any further complaints at this time.    Objective :  Temp:  [98.8 °F (37.1 °C)-99.9 °F (37.7 °C)] 98.8 °F (37.1 °C)  HR:  [71-98] 98  BP: (109-131)/(55-67) 131/67  Resp:  [17-19] 19  SpO2:  [87 %-94 %] 92 %  O2 Device: None (Room air)     I/O         05/18 0701  05/19 0700 05/19 0701  05/20 0700 05/20 0701  05/21 0700    P.O. 240 660     I.V. (mL/kg)       Blood 377.5      Total Intake(mL/kg) 617.5 (7.9) 660 (7.8)     Urine (mL/kg/hr) 2320 (1.2) 400 (0.2)     Stool 0      Total Output 2320 400     Net -1702.5 +260            Unmeasured Stool Occurrence 2 x              Physical Exam  Vitals and nursing note reviewed.   Constitutional:       General: He is awake. He is not in acute distress.     Appearance: Normal appearance. He is well-developed.   HENT:      Head: Normocephalic and atraumatic.     Cardiovascular:      Rate and Rhythm: Normal rate and regular rhythm.      Pulses:           Dorsalis pedis pulses are detected w/ Doppler on the right side and detected w/ Doppler on the left side.        Posterior tibial pulses are detected w/ Doppler on the right side and detected w/ Doppler on the left side.      Heart sounds: Normal heart sounds.       Comments: Trace R pedal and lower calf edema. Multiphasic R DP/PT signals.  Pulmonary:      Effort: Pulmonary effort is normal. No respiratory distress.   Abdominal:      General: There is no distension.      Palpations: Abdomen is soft.     Musculoskeletal:         General: No swelling.      Cervical back: Neck supple.      Right lower leg: Edema present.     Skin:     General: Skin is warm and dry.      Capillary Refill: Capillary refill takes less than 2 seconds.      Findings: Wound present.      Comments: R 5th met surgical site. R groin incision.     Neurological:      General: No focal deficit present.      Mental Status: He is alert and oriented to person, place, and time. Mental status is at baseline.     Psychiatric:         Mood and Affect: Mood normal.         Speech: Speech normal.         Behavior: Behavior normal. Behavior is cooperative.         Thought Content: Thought content normal.         Judgment: Judgment normal.       Wound/Incision:  R 5th metatarsal surgical wound w/ dressing clean, dry and intact. R groin incision site soft, well-approx, no swelling, ecchymosis, erythema or drainage, staples intact.       Lab Results: I have reviewed the following results:  CBC with diff:   Lab Results   Component Value Date    WBC 13.20 (H) 05/20/2025    HGB 9.6 (L) 05/20/2025    HCT 30.2 (L) 05/20/2025    MCV 92 05/20/2025     (H) 05/20/2025    RBC 3.28 (L) 05/20/2025    MCH 29.3 05/20/2025    MCHC 31.8 05/20/2025    RDW 16.1 (H) 05/20/2025    MPV 9.1 05/20/2025    NRBC 0 05/20/2025     BMP/CMP:  Lab Results   Component Value Date    SODIUM 134 (L) 05/20/2025    K 3.8 05/20/2025    K 4.5 04/14/2015    CL 99 05/20/2025     04/14/2015    CO2 26 05/20/2025    CO2 22 05/13/2025    ANIONGAP 8 04/14/2015    BUN 10 05/20/2025    BUN 10 04/14/2015    CREATININE 0.95 05/20/2025    CREATININE 0.86 04/14/2015    GLUCOSE 262 (H) 05/13/2025    GLUCOSE 179 (H) 04/14/2015    CALCIUM 8.5 05/20/2025     CALCIUM 8.9 04/14/2015    AST 12 (L) 05/16/2025    AST 12 08/12/2014    ALT 4 (L) 05/16/2025    ALT 19 08/12/2014    ALKPHOS 51 05/16/2025    ALKPHOS 101 08/12/2014    PROT 6.8 08/12/2014    BILITOT 0.6 08/12/2014    EGFR 75 05/20/2025     Coags:   Lab Results   Component Value Date    PTT 53 (H) 05/15/2025    INR 1.14 05/07/2025     Blood Culture:   Lab Results   Component Value Date    BLOODCX No Growth After 5 Days. 05/07/2025    BLOODCX No Growth After 5 Days. 05/07/2025     Urinalysis:   Lab Results   Component Value Date    COLORU Yellow 05/07/2025    COLORU Yellow 08/12/2014    CLARITYU Clear 05/07/2025    CLARITYU Clear 08/12/2014    SPECGRAV 1.010 05/07/2025    SPECGRAV 1.025 08/12/2014    PHUR 6.0 05/07/2025    PHUR 6.0 06/14/2016    PHUR 6.0 08/12/2014    LEUKOCYTESUR Negative 05/07/2025    LEUKOCYTESUR Negative 08/12/2014    NITRITE Negative 05/07/2025    NITRITE Negative 08/12/2014    PROTEINUA Negative 08/12/2014    GLUCOSEU Negative 05/07/2025    GLUCOSEU 100 (1/10%) (A) 08/12/2014    KETONESU Negative 05/07/2025    KETONESU Negative 08/12/2014    BILIRUBINUR Negative 05/07/2025    BILIRUBINUR Negative 08/12/2014    BLOODU 1+ (A) 05/07/2025    BLOODU Trace (A) 08/12/2014     Wound Culure:   Lab Results   Component Value Date    WOUNDCULT 3+ Growth of Serratia marcescens (A) 05/16/2025    WOUNDCULT 3+ Growth of Enterococcus faecalis (A) 05/16/2025    WOUNDCULT 1+ Growth of Pseudomonas aeruginosa (A) 05/16/2025

## 2025-05-20 NOTE — ASSESSMENT & PLAN NOTE
Last known LVEF 50% echocardiogram 2024  Patient being treated for volume overload secondary to blood transfusion/IV fluids as described above

## 2025-05-20 NOTE — ASSESSMENT & PLAN NOTE
Patient developed acute onset of dyspnea evening of 5/18 after blood transfusion  EKG with transient lateral ST depression  Troponins elevated 163-> 155-> 193-> 237  1/25 nuclear stress test: Ejection fraction 65%.  No evidence of ischemia  Appreciate Cardiology eval: elevated trop due to volume overload  On review of CT scan patient did have quite significant coronary calcifications  Continue medical therapy with Plavix, statin, and beta-blocker

## 2025-05-20 NOTE — TELEPHONE ENCOUNTER
Caller: Stacia - spouse    Doctor: Dr. Aiken    Reason for call: Call regarding post op visit 6/5/25. Spouse states that patient is still in the hospital with complications of his foot and then will be transferred to rehab. She does not think she will be able to bring him to his post op appointment but would like to know what to do regarding post op wound check and staple removal. Post op appointment still in schedule.    Call back#: home#: [phone]                     Mobile# [phone]

## 2025-05-20 NOTE — ASSESSMENT & PLAN NOTE
80 yo male ex-smoker w/ a hx of DM II (A1c 9.3), HTN, HLD, asthma, CVA (9/2024), PAF (eliquis), cardiomyopathy and PAD presented to [facility] on 5/7/25 w/ R foot pain x1 week and non-healing R 5th met wound x4-5 weeks. Pt admitted and started on ABX. Pt was transferred to [facility] on 5/11/25 for vascular surgical intervention.     Vascular surgery consulted for worsening R 5th met wound in the setting of PAD. CTA abd shows L CFA/SFA stenosis w/ focal occlusions and AT/peroneal occlusions w/ reconstitution. LEAD shows R PTA occlusion and R RIC: 0.42/15/16.    -Pt is S/P R CFA/SFA endarterectomy w/ bovine pericardial patch, DCB and stenting of SFA, and angio of AT on 5/13.   -Pt is S/P R partial 5th ray resection by podiatry on 5/16.    Diagnostics:  -5/6/25 CTA abd w/ runoff: B/L EDY/EIA/IIA patent w/ atherosclerosis. Right: Focal high-grade stenosis of distal R CFA and diffuse atherosclerotic disease of R SFA resulting in moderate to severe stenoses with focal near complete occlusions at the proximal and distal segments. Short segment occlusions of proximal R AT and peroneal arteries with reconstitution. R PTA is occluded. Left: Occluded L SFA with reconstitution. L YUNG and PTA occluded. One-vessel runoff LLE through peroneal artery.  -4/18/25 LEAD: Right: >75% prox SFA and 50-75% dSFA stenosis. Distal PTA occlusion. R RIC: 0.42/15/16. Left: PTA and YUNG occlusions. L RIC: 1.06/85/45.   -4/17/25 MRI R foot: Possible early OM in R 5th met    Plan:  -R 5th met wound (+) OM in the setting of PAD  -S/P R CFA/SFA endarterectomy w/ bovine pericardial patch, DCB and stenting of SFA, and angio of AT on 5/13 (POD #7)  -Continue daily incision care to R groin by nursing   -Continue plavix (new SFA stents) and lipitor for medical management of PAD   -Continue eliquis for hx of afib  -Podiatry following, S/P R partial 5th ray resection on 5/16  -Cardiology following for hx of CHF w/ volume overload in the setting of multiple  surgeries; input appreciated   -Continue medical management per primary team  -Case management following w/ plans for STR  -Pt is stable for discharge from a vascular surgery standpoint  -Will continue to follow peripherally while pt remains hospitalized  -Plan follow up outpt in vascular surgery office; appt scheduled for 6/5/25  -Will discuss w/ Dr. Cee

## 2025-05-20 NOTE — PLAN OF CARE
Problem: PAIN - ADULT  Goal: Verbalizes/displays adequate comfort level or baseline comfort level  Description: Interventions:- Encourage patient to monitor pain and request assistance- Assess pain using appropriate pain scale- Administer analgesics based on type and severity of pain and evaluate response- Implement non-pharmacological measures as appropriate and evaluate response- Consider cultural and social influences on pain and pain management- Notify physician/advanced practitioner if interventions unsuccessful or patient reports new pain  Outcome: Progressing     Problem: DISCHARGE PLANNING  Goal: Discharge to home or other facility with appropriate resources  Description: INTERVENTIONS:- Identify barriers to discharge w/patient and caregiver- Arrange for needed discharge resources and transportation as appropriate- Identify discharge learning needs (meds, wound care, etc.)- Arrange for interpretive services to assist at discharge as needed- Refer to Case Management Department for coordinating discharge planning if the patient needs post-hospital services based on physician/advanced practitioner order or complex needs related to functional status, cognitive ability, or social support system  Outcome: Progressing     Problem: SKIN/TISSUE INTEGRITY - ADULT  Goal: Skin Integrity remains intact(Skin Breakdown Prevention)  Description: Assess:-Perform Sae assessment -Clean and moisturize skin -Inspect skin when repositioning, toileting, and assisting with ADLS-Assess under medical devices -Assess extremities for adequate circulation and sensation Bed Management:-Have minimal linens on bed & keep smooth, unwrinkled-Change linens as needed when moist or perspiring-Avoid sitting or lying in one position for more than 2 hours while in bed-Keep HOB at 30 degrees Toileting:-Offer bedside commode-Assess for incontinence -Use incontinent care products after each incontinent episode Activity:-Mobilize patient 3 times  a day-Encourage activity and walks on unit-Encourage or provide ROM exercises -Turn and reposition patient every 2 Hours-Use appropriate equipment to lift or move patient in bed-Instruct/ Assist with weight shifting  when out of bed in chair-Consider limitation of chair time 2 hour intervalsSkin Care:-Avoid use of baby powder, tape, friction and shearing, hot water or constrictive clothing-Relieve pressure over bony prominences Do not massage red bony areasNext Steps:-Teach patient strategies to minimize risks Consider consults to  interdisciplinary teams   Outcome: Progressing  Goal: Incision(s), wounds(s) or drain site(s) healing without S/S of infection  Description: INTERVENTIONS- Assess and document dressing, incision, wound bed, drain sites and surrounding tissue- Provide patient and family education- Perform skin care/dressing changes every shift  Outcome: Progressing  Goal: Pressure injury heals and does not worsen  Description: Interventions:- Implement low air loss mattress or specialty surface (Criteria met)- Apply silicone foam dressing- Instruct/assist with weight shifting every 120 minutes when in chair - Limit chair time to 2 hour intervals- Use special pressure reducing interventions such as turning when in chair - Apply fecal or urinary incontinence containment device - Perform passive or active ROM every 2 hours- Turn and reposition patient & offload bony prominences every 2 hours - Utilize friction reducing device or surface for transfers - Consider consults to  interdisciplinary teams such as wound- Use incontinent care products after each incontinent episode such as wipes- Consider nutrition services referral as needed  Outcome: Progressing     Problem: Nutrition/Hydration-ADULT  Goal: Nutrient/Hydration intake appropriate for improving, restoring or maintaining nutritional needs  Description: Monitor and assess patient's nutrition/hydration status for malnutrition. Collaborate with  interdisciplinary team and initiate plan and interventions as ordered.  Monitor patient's weight and dietary intake as ordered or per policy. Utilize nutrition screening tool and intervene as necessary. Determine patient's food preferences and provide high-protein, high-caloric foods as appropriate. INTERVENTIONS:- Monitor oral intake, urinary output, labs, and treatment plans- Assess nutrition and hydration status and recommend course of action- Evaluate amount of meals eaten- Assist patient with eating if necessary - Allow adequate time for meals- Recommend/ encourage appropriate diets, oral nutritional supplements, and vitamin/mineral supplements- Order, calculate, and assess calorie counts as needed- Recommend, monitor, and adjust tube feedings and TPN/PPN based on assessed needs- Assess need for intravenous fluids- Provide specific nutrition/hydration education as appropriate- Include patient/family/caregiver in decisions related to nutrition  Outcome: Progressing     Problem: Prexisting or High Potential for Compromised Skin Integrity  Goal: Skin integrity is maintained or improved  Description: INTERVENTIONS:  - Identify patients at risk for skin breakdown  - Assess and monitor skin integrity  - Assess and monitor nutrition and hydration status  - Monitor labs   - Assess for incontinence   - Turn and reposition patient  - Assist with mobility/ambulation  - Relieve pressure over bony prominences  - Avoid friction and shearing  - Provide appropriate hygiene as needed including keeping skin clean and dry  - Evaluate need for skin moisturizer/barrier cream  - Collaborate with interdisciplinary team   - Patient/family teaching  - Consider wound care consult   Outcome: Progressing     Problem: INFECTION - ADULT  Goal: Absence or prevention of progression during hospitalization  Description: INTERVENTIONS:  - Assess and monitor for signs and symptoms of infection  - Monitor lab/diagnostic results  - Monitor all  insertion sites, i.e. indwelling lines, tubes, and drains  - Monitor endotracheal if appropriate and nasal secretions for changes in amount and color  - [place] appropriate cooling/warming therapies per order  - Administer medications as ordered  - Instruct and encourage patient and family to use good hand hygiene technique  - Identify and instruct in appropriate isolation precautions for identified infection/condition  Outcome: Progressing  Goal: Absence of fever/infection during neutropenic period  Description: INTERVENTIONS:  - Monitor WBC    Outcome: Progressing     Problem: SAFETY ADULT  Goal: Patient will remain free of falls  Description: INTERVENTIONS:  - Educate patient/family on patient safety including physical limitations  - Instruct patient to call for assistance with activity   - Consult OT/PT to assist with strengthening/mobility   - Keep Call bell within reach  - Keep bed low and locked with side rails adjusted as appropriate  - Keep care items and personal belongings within reach  - Initiate and maintain comfort rounds  - Make Fall Risk Sign visible to staff  - Offer Toileting every 2 Hours, in advance of need  - Initiate/Maintain bed alarm  - Obtain necessary fall risk management equipment:   - Apply yellow socks and bracelet for high fall risk patients  - Consider moving patient to room near nurses station  Outcome: Progressing  Goal: Maintain or return to baseline ADL function  Description: INTERVENTIONS:  -  Assess patient's ability to carry out ADLs; assess patient's baseline for ADL function and identify physical deficits which impact ability to perform ADLs (bathing, care of mouth/teeth, toileting, grooming, dressing, etc.)  - Assess/evaluate cause of self-care deficits   - Assess range of motion  - Assess patient's mobility; develop plan if impaired  - Assess patient's need for assistive devices and provide as appropriate  - Encourage maximum independence but intervene and supervise when  necessary  - Involve family in performance of ADLs  - Assess for home care needs following discharge   - Consider OT consult to assist with ADL evaluation and planning for discharge  - Provide patient education as appropriate  Outcome: Progressing  Goal: Maintains/Returns to pre admission functional level  Description: INTERVENTIONS:  - Perform AM-PAC 6 Click Basic Mobility/ Daily Activity assessment daily.  - Set and communicate daily mobility goal to care team and patient/family/caregiver.   - Collaborate with rehabilitation services on mobility goals if consulted  - Perform Range of Motion 4 times a day.  - Reposition patient every 3 hours.  - Dangle patient 3 times a day  - Stand patient 3 times a day  - Ambulate patient 3 times a day  - Out of bed to chair 3 times a day   - Out of bed for meals 3 times a day  - Out of bed for toileting  - Record patient progress and toleration of activity level   Outcome: Progressing     Problem: Knowledge Deficit  Goal: Patient/family/caregiver demonstrates understanding of disease process, treatment plan, medications, and discharge instructions  Description: Complete learning assessment and assess knowledge base.  Interventions:  - Provide teaching at level of understanding  - Provide teaching via preferred learning methods  Outcome: Progressing     Problem: METABOLIC, FLUID AND ELECTROLYTES - ADULT  Goal: Electrolytes maintained within normal limits  Description: INTERVENTIONS:  - Monitor labs and assess patient for signs and symptoms of electrolyte imbalances  - Administer electrolyte replacement as ordered  - Monitor response to electrolyte replacements, including repeat lab results as appropriate  - Instruct patient on fluid and nutrition as appropriate  Outcome: Progressing     Problem: HEMATOLOGIC - ADULT  Goal: Maintains hematologic stability  Description: INTERVENTIONS  - Assess for signs and symptoms of bleeding or hemorrhage  - Monitor labs  - Administer supportive  blood products/factors as ordered and appropriate  Outcome: Progressing     Problem: CARDIOVASCULAR - ADULT  Goal: Maintains optimal cardiac output and hemodynamic stability  Description: INTERVENTIONS:  - Monitor I/O, vital signs and rhythm  - Monitor for S/S and trends of decreased cardiac output  - Administer and titrate ordered vasoactive medications to optimize hemodynamic stability  - Assess quality of pulses, skin color and temperature  - Assess for signs of decreased coronary artery perfusion  - Instruct patient to report change in severity of symptoms  Outcome: Progressing  Goal: Absence of cardiac dysrhythmias or at baseline rhythm  Description: INTERVENTIONS:  - Continuous cardiac monitoring, vital signs, obtain 12 lead EKG if ordered  - Administer antiarrhythmic and heart rate control medications as ordered  - Monitor electrolytes and administer replacement therapy as ordered  Outcome: Progressing

## 2025-05-20 NOTE — ASSESSMENT & PLAN NOTE
Lab Results   Component Value Date    HGBA1C 9.3 (H) 04/09/2025     Recent Labs     05/19/25  1058 05/19/25  1626 05/19/25  2126 05/20/25  0751   POCGLU 210* 197* 144* 87     Prior to admission meds: glipizide, linagliptin and metformin  A1c 9.3.  Endocrinology consulted by vascular surgery  Oral agents on hold preoperatively  Accu-Cheks and sliding scale insulin

## 2025-05-20 NOTE — ASSESSMENT & PLAN NOTE
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  Now status post partial fifth ray resection with presumed surgical cure on 5/13.  -Follow-up eventual repeat podiatry washout  -Will need to confirm if there is any ongoing concern for residual bone infection

## 2025-05-20 NOTE — PLAN OF CARE
Problem: PAIN - ADULT  Goal: Verbalizes/displays adequate comfort level or baseline comfort level  Description: Interventions:- Encourage patient to monitor pain and request assistance- Assess pain using appropriate pain scale- Administer analgesics based on type and severity of pain and evaluate response- Implement non-pharmacological measures as appropriate and evaluate response- Consider cultural and social influences on pain and pain management- Notify physician/advanced practitioner if interventions unsuccessful or patient reports new pain  Outcome: Progressing     Problem: DISCHARGE PLANNING  Goal: Discharge to home or other facility with appropriate resources  Description: INTERVENTIONS:- Identify barriers to discharge w/patient and caregiver- Arrange for needed discharge resources and transportation as appropriate- Identify discharge learning needs (meds, wound care, etc.)- Arrange for interpretive services to assist at discharge as needed- Refer to Case Management Department for coordinating discharge planning if the patient needs post-hospital services based on physician/advanced practitioner order or complex needs related to functional status, cognitive ability, or social support system  Outcome: Progressing     Problem: SKIN/TISSUE INTEGRITY - ADULT  Goal: Skin Integrity remains intact(Skin Breakdown Prevention)  Description: Assess:-Perform Sae assessment -Clean and moisturize skin -Inspect skin when repositioning, toileting, and assisting with ADLS-Assess under medical devices -Assess extremities for adequate circulation and sensation Bed Management:-Have minimal linens on bed & keep smooth, unwrinkled-Change linens as needed when moist or perspiring-Avoid sitting or lying in one position for more than 2 hours while in bed-Keep HOB at 30 degrees Toileting:-Offer bedside commode-Assess for incontinence -Use incontinent care products after each incontinent episode Activity:-Mobilize patient 3 times  a day-Encourage activity and walks on unit-Encourage or provide ROM exercises -Turn and reposition patient every 2 Hours-Use appropriate equipment to lift or move patient in bed-Instruct/ Assist with weight shifting  when out of bed in chair-Consider limitation of chair time 2 hour intervalsSkin Care:-Avoid use of baby powder, tape, friction and shearing, hot water or constrictive clothing-Relieve pressure over bony prominences Do not massage red bony areasNext Steps:-Teach patient strategies to minimize risks Consider consults to  interdisciplinary teams   Outcome: Progressing  Goal: Incision(s), wounds(s) or drain site(s) healing without S/S of infection  Description: INTERVENTIONS- Assess and document dressing, incision, wound bed, drain sites and surrounding tissue- Provide patient and family education- Perform skin care/dressing changes every shift  Outcome: Progressing  Goal: Pressure injury heals and does not worsen  Description: Interventions:- Implement low air loss mattress or specialty surface (Criteria met)- Apply silicone foam dressing- Instruct/assist with weight shifting every 120 minutes when in chair - Limit chair time to 2 hour intervals- Use special pressure reducing interventions such as turning when in chair - Apply fecal or urinary incontinence containment device - Perform passive or active ROM every 2 hours- Turn and reposition patient & offload bony prominences every 2 hours - Utilize friction reducing device or surface for transfers - Consider consults to  interdisciplinary teams such as wound- Use incontinent care products after each incontinent episode such as wipes- Consider nutrition services referral as needed  Outcome: Progressing     Problem: Nutrition/Hydration-ADULT  Goal: Nutrient/Hydration intake appropriate for improving, restoring or maintaining nutritional needs  Description: Monitor and assess patient's nutrition/hydration status for malnutrition. Collaborate with  interdisciplinary team and initiate plan and interventions as ordered.  Monitor patient's weight and dietary intake as ordered or per policy. Utilize nutrition screening tool and intervene as necessary. Determine patient's food preferences and provide high-protein, high-caloric foods as appropriate. INTERVENTIONS:- Monitor oral intake, urinary output, labs, and treatment plans- Assess nutrition and hydration status and recommend course of action- Evaluate amount of meals eaten- Assist patient with eating if necessary - Allow adequate time for meals- Recommend/ encourage appropriate diets, oral nutritional supplements, and vitamin/mineral supplements- Order, calculate, and assess calorie counts as needed- Recommend, monitor, and adjust tube feedings and TPN/PPN based on assessed needs- Assess need for intravenous fluids- Provide specific nutrition/hydration education as appropriate- Include patient/family/caregiver in decisions related to nutrition  Outcome: Progressing     Problem: Prexisting or High Potential for Compromised Skin Integrity  Goal: Skin integrity is maintained or improved  Description: INTERVENTIONS:  - Identify patients at risk for skin breakdown  - Assess and monitor skin integrity  - Assess and monitor nutrition and hydration status  - Monitor labs   - Assess for incontinence   - Turn and reposition patient  - Assist with mobility/ambulation  - Relieve pressure over bony prominences  - Avoid friction and shearing  - Provide appropriate hygiene as needed including keeping skin clean and dry  - Evaluate need for skin moisturizer/barrier cream  - Collaborate with interdisciplinary team   - Patient/family teaching  - Consider wound care consult   Outcome: Progressing     Problem: INFECTION - ADULT  Goal: Absence or prevention of progression during hospitalization  Description: INTERVENTIONS:  - Assess and monitor for signs and symptoms of infection  - Monitor lab/diagnostic results  - Monitor all  insertion sites, i.e. indwelling lines, tubes, and drains  - Monitor endotracheal if appropriate and nasal secretions for changes in amount and color  - [place] appropriate cooling/warming therapies per order  - Administer medications as ordered  - Instruct and encourage patient and family to use good hand hygiene technique  - Identify and instruct in appropriate isolation precautions for identified infection/condition  Outcome: Progressing  Goal: Absence of fever/infection during neutropenic period  Description: INTERVENTIONS:  - Monitor WBC    Outcome: Progressing     Problem: SAFETY ADULT  Goal: Patient will remain free of falls  Description: INTERVENTIONS:  - Educate patient/family on patient safety including physical limitations  - Instruct patient to call for assistance with activity   - Consult OT/PT to assist with strengthening/mobility   - Keep Call bell within reach  - Keep bed low and locked with side rails adjusted as appropriate  - Keep care items and personal belongings within reach  - Initiate and maintain comfort rounds  - Make Fall Risk Sign visible to staff  - Offer Toileting every 2 Hours, in advance of need  - Initiate/Maintain bed alarm  - Obtain necessary fall risk management equipment:   - Apply yellow socks and bracelet for high fall risk patients  - Consider moving patient to room near nurses station  Outcome: Progressing  Goal: Maintain or return to baseline ADL function  Description: INTERVENTIONS:  -  Assess patient's ability to carry out ADLs; assess patient's baseline for ADL function and identify physical deficits which impact ability to perform ADLs (bathing, care of mouth/teeth, toileting, grooming, dressing, etc.)  - Assess/evaluate cause of self-care deficits   - Assess range of motion  - Assess patient's mobility; develop plan if impaired  - Assess patient's need for assistive devices and provide as appropriate  - Encourage maximum independence but intervene and supervise when  necessary  - Involve family in performance of ADLs  - Assess for home care needs following discharge   - Consider OT consult to assist with ADL evaluation and planning for discharge  - Provide patient education as appropriate  Outcome: Progressing  Goal: Maintains/Returns to pre admission functional level  Description: INTERVENTIONS:  - Perform AM-PAC 6 Click Basic Mobility/ Daily Activity assessment daily.  - Set and communicate daily mobility goal to care team and patient/family/caregiver.   - Collaborate with rehabilitation services on mobility goals if consulted  - Perform Range of Motion 4 times a day.  - Reposition patient every 3 hours.  - Dangle patient 3 times a day  - Stand patient 3 times a day  - Ambulate patient 3 times a day  - Out of bed to chair 3 times a day   - Out of bed for meals 3 times a day  - Out of bed for toileting  - Record patient progress and toleration of activity level   Outcome: Progressing     Problem: Knowledge Deficit  Goal: Patient/family/caregiver demonstrates understanding of disease process, treatment plan, medications, and discharge instructions  Description: Complete learning assessment and assess knowledge base.  Interventions:  - Provide teaching at level of understanding  - Provide teaching via preferred learning methods  Outcome: Progressing     Problem: METABOLIC, FLUID AND ELECTROLYTES - ADULT  Goal: Electrolytes maintained within normal limits  Description: INTERVENTIONS:  - Monitor labs and assess patient for signs and symptoms of electrolyte imbalances  - Administer electrolyte replacement as ordered  - Monitor response to electrolyte replacements, including repeat lab results as appropriate  - Instruct patient on fluid and nutrition as appropriate  Outcome: Progressing     Problem: HEMATOLOGIC - ADULT  Goal: Maintains hematologic stability  Description: INTERVENTIONS  - Assess for signs and symptoms of bleeding or hemorrhage  - Monitor labs  - Administer supportive  blood products/factors as ordered and appropriate  Outcome: Progressing     Problem: CARDIOVASCULAR - ADULT  Goal: Maintains optimal cardiac output and hemodynamic stability  Description: INTERVENTIONS:  - Monitor I/O, vital signs and rhythm  - Monitor for S/S and trends of decreased cardiac output  - Administer and titrate ordered vasoactive medications to optimize hemodynamic stability  - Assess quality of pulses, skin color and temperature  - Assess for signs of decreased coronary artery perfusion  - Instruct patient to report change in severity of symptoms  Outcome: Progressing  Goal: Absence of cardiac dysrhythmias or at baseline rhythm  Description: INTERVENTIONS:  - Continuous cardiac monitoring, vital signs, obtain 12 lead EKG if ordered  - Administer antiarrhythmic and heart rate control medications as ordered  - Monitor electrolytes and administer replacement therapy as ordered  Outcome: Progressing

## 2025-05-20 NOTE — PROGRESS NOTES
Podiatry - Progress Note  Patient: Gold Van 79 y.o. male   MRN: [id number]  PCP: Shayne Diaz MD  Unit/Bed#: [address] 217-01 Encounter: [id number]  Date Of Visit: 25    ASSESSMENT:    Gold Van is a 79 y.o. male with:    Right 5th metatarsal ulceration with underlying OM ([place] [facility])  - s/p right fifth ray resection (DOS: 25)  Right foot cellulitis  PAD  - s/p right femoral endarterectomy and antegrade endovascular intervention (DOS: 25)  Type 2 diabetes mellitus  - A1c: 9.3% (25)      PLAN:    Patient seen and evaluated, POD 4 s/p Right partial 5th ray resection. Patient will need repeat washout of right foot, PENDING cardiology clearance.   Recommend tailoring antibiotic therapy based on intra-op cultures. Positive for 3+ Serratia marcescens, 3+ Enterococcus faecalis, and 1+ Pseudomonas aeruginosa. Continue antibiotics per ID.   Elevation and offloading on green foam wedges or pillows when non-ambulatory.  Rest of care per primary team.     Weightbearing status: NWB to the RLE.     SUBJECTIVE:     The patient was seen, evaluated, and assessed at bedside today. The patient was awake, alert, and in no acute distress. No acute events overnight. The patient reports that he has pain to his right foot. Explained that this may be phantom pain. Patient denies N/V/F/chills/SOB/CP.      OBJECTIVE:     Vitals:   /67   Pulse 98   Temp 98.8 °F (37.1 °C)   Resp 19   Ht 6' (1.829 m)   Wt 84.5 kg (186 lb 4.6 oz)   SpO2 92%   BMI 25.27 kg/m²     Temp (24hrs), Av.3 °F (37.4 °C), Min:98.8 °F (37.1 °C), Max:99.9 °F (37.7 °C)      Physical Exam:     Lungs: Non labored breathing  Abdomen: Soft, non-tender.  Lower Extremity:  Cardiovascular status at baseline from admission.  Neurological status at baseline from admission.  Musculoskeletal status at baseline from admission. No calf tenderness noted.     Wound #: 1  Location: right lateral foot  Length 8cm: Width 3cm: Depth 3.5cm:  "  Deepest Tissue Noted in Base: surgical bone  Probe to Bone: Yes  Peripheral Skin Description: Attached  Granulation: 0% Fibrotic Tissue: 90% Necrotic Tissue: 10%   Drainage Amount: moderate serous  Signs of Infection: Yes cellulitis    Clinical Images 05/20/25:      Additional Data:     Labs:    Results from last 7 days   Lab Units 05/20/25  0603   WBC Thousand/uL 13.20*   HEMOGLOBIN g/dL 9.6*   HEMATOCRIT % 30.2*   PLATELETS Thousands/uL 520*   SEGS PCT % 69   LYMPHO PCT % 19   MONO PCT % 9   EOS PCT % 1     Results from last 7 days   Lab Units 05/20/25  0603 05/17/25  0542 05/16/25  0545 05/14/25  0436 05/13/25  1512   POTASSIUM mmol/L 3.8   < > 4.0   < >  --    CHLORIDE mmol/L 99   < > 103   < >  --    CO2 mmol/L 26   < > 25   < >  --    CO2, I-STAT mmol/L  --   --   --   --  22   BUN mg/dL 10   < > 14   < >  --    CREATININE mg/dL 0.95   < > 0.87   < >  --    CALCIUM mg/dL 8.5   < > 8.1*   < >  --    ALK PHOS U/L  --   --  51   < >  --    ALT U/L  --   --  4*   < >  --    AST U/L  --   --  12*   < >  --    GLUCOSE, ISTAT mg/dl  --   --   --   --  262*    < > = values in this interval not displayed.           * I Have Reviewed All Lab Data Listed Above.    Recent Cultures (last 7 days):     Results from last 7 days   Lab Units 05/16/25  1337   GRAM STAIN RESULT  Rare Gram positive cocci in pairs*  No polys seen*   WOUND CULTURE  3+ Growth of Serratia marcescens*  3+ Growth of Enterococcus faecalis*  1+ Growth of Pseudomonas aeruginosa*     Results from last 7 days   Lab Units 05/16/25  1337   ANAEROBIC CULTURE  No anaerobes isolated       Imaging: I have personally reviewed pertinent films in PACS  EKG, Pathology, and Other Studies: I have personally reviewed pertinent reports.    ** Please Note: Portions of the record may have been created with voice recognition software. Occasional wrong word or \"sound a like\" substitutions may have occurred due to the inherent limitations of voice recognition software. " Read the chart carefully and recognize, using context, where substitutions have occurred. **

## 2025-05-20 NOTE — ASSESSMENT & PLAN NOTE
Patient had episode of dyspnea after blood transfusion   Most likely volume overload secondary to blood transfusions, IV fluids/antibiotics, in the setting of chronic hpf EF  most recent echocardiogram 10/24: Left ventricular ejection fraction 50%.  Grade 1 diastolic dysfunction.  Mild hypokinesis of the posterior wall  Chest x-ray: Moderate interstitial edema  Patient was placed on diuresis with IV Lasix  unfortunately patient refused both doses of IV Lasix yesterday: Notes he is agreeable to receive them today  Continue IV diuresis

## 2025-05-20 NOTE — ASSESSMENT & PLAN NOTE
Known chronic diabetic foot ulcer, MRI in April suggestive of osteomyelitis of fifth metatarsal head.  Admitted with acute wound cellulitis status post right partial fifth ray resection with assumed surgical cure of osteomyelitis.  There was yellow viscous fluid within the metatarsal phalangeal joint consistent with infection.  On dressing change 5/19 patient had ongoing green-colored drainage concerning for ongoing soft tissue infection.  Patient will need further right foot washout pending cardiology clearance.  OR cx from 5/16 grew Serratia, Enterococcus faecalis and Pseudomonas.  -Change antibiotic to IV Zosyn based on culture  -Await further podiatry washout  -Antibiotic duration will depend on degree of surgical source control

## 2025-05-20 NOTE — ASSESSMENT & PLAN NOTE
- met SIRS criteria at [facility] with 05/07/25 with tachycardia and leukocytosis   - 5/16: Underwent right partial fifth ray resection: presumed surgical cure  - s/p right CFA SFA endarterectomy/stenting 5/13/2025   - currently on Plavix 75 mg daily (podiatry and vascular following)

## 2025-05-20 NOTE — ASSESSMENT & PLAN NOTE
Lab Results   Component Value Date    HGBA1C 9.3 (H) 04/09/2025   Significant risk factor for infection and poor wound healing.  -Tight glycemic control as per primary team

## 2025-05-20 NOTE — ASSESSMENT & PLAN NOTE
Transferred here for vascular surgery intervention  Underwent right CFA SFA endarterectomy/stenting 5/13/2025  Aspirin discontinued.  Started on clopidogrel for stents  Continue statin  Patient is also on Eliquis for paroxysmal atrial fibrillation  Discussed with vascular surgery team: No additional intervention required from their standpoint they will see in office follow-up

## 2025-05-20 NOTE — ASSESSMENT & PLAN NOTE
Status post vascular surgery intervention on 5/13 with right common femoral endarterectomy with bovine pericardial patch angioplasty, right SFA stenting, right angioplasty.

## 2025-05-20 NOTE — TELEPHONE ENCOUNTER
Spoke with patients wife, she stated she is unsure when patient will be going to rehab. I let her know to ask the rehab when he goes if they provide transportation. She stated that [place] may be difficult due to being over an hour away. She asked if there was something available in [place] or [place]. I let her know to call back next week to see where patient is at whether in the hospital or in rehab and if his staples were removed or not. Gave her my direct line to call.

## 2025-05-20 NOTE — ASSESSMENT & PLAN NOTE
Prior baseline hemoglobin appears to range 10-12  Postprocedure hemoglobin decreased to 7.0 when patient was symptomatic: Secondary to expected procedural blood loss  On 5/18 transfused 1 unit PRBC   Repeat hemoglobin 9's  Hemoglobin stable    Results from last 7 days   Lab Units 05/20/25  0603 05/19/25  0548 05/18/25  0440   HEMOGLOBIN g/dL 9.6* 9.0* 7.0*   MCV fL 92 91 94

## 2025-05-20 NOTE — PROGRESS NOTES
Progress Note - Hospitalist   Name: Gold Van 79 y.o. male I MRN: [id number]  Unit/Bed#: [place] [address] -01 I Date of Admission: 5/11/2025   Date of Service: 5/20/2025 I Hospital Day: 9    Assessment & Plan  Sepsis without acute organ dysfunction (HCC)  Patient is a 79-year-old male with past medical history significant for DM2, PAF, asthma, and PAD who presented with right foot wound  Met sepsis criteria at [facility] initially  Diagnosed with right fifth metatarsal osteomyelitis with right foot cellulitis  Placed on cefazolin/metronidazole  Transferred here for vascular surgery  Now status post right fifth ray amputation 5/16  intraoperative cultures: Serratia/Enterococcus and now pseudomonas  Blood culture negative x 2  D/w ID:  will change abx to Zosyn  Plan for return to OR once optimized from volume status-  possibly in am  Troponin I above reference range  Patient developed acute onset of dyspnea evening of 5/18 after blood transfusion  EKG with transient lateral ST depression  Troponins elevated 163-> 155-> 193-> 237  1/25 nuclear stress test: Ejection fraction 65%.  No evidence of ischemia  Appreciate Cardiology eval: elevated trop due to volume overload  On review of CT scan patient did have quite significant coronary calcifications  Continue medical therapy with Plavix, statin, and beta-blocker  Volume overload  Patient had episode of dyspnea after blood transfusion   Most likely volume overload secondary to blood transfusions, IV fluids/antibiotics, in the setting of chronic hpf EF  most recent echocardiogram 10/24: Left ventricular ejection fraction 50%.  Grade 1 diastolic dysfunction.  Mild hypokinesis of the posterior wall  Chest x-ray: Moderate interstitial edema  Patient was placed on diuresis with IV Lasix  unfortunately patient refused both doses of IV Lasix yesterday: Notes he is agreeable to receive them today  Continue IV diuresis  Severe peripheral arterial disease (HCC)  Transferred  here for vascular surgery intervention  Underwent right CFA SFA endarterectomy/stenting 5/13/2025  Aspirin discontinued.  Started on clopidogrel for stents  Continue statin  Patient is also on Eliquis for paroxysmal atrial fibrillation  Discussed with vascular surgery team: No additional intervention required from their standpoint they will see in office follow-up  Osteomyelitis (HCC)  Right fifth metatarsal ulceration with underlying osteomyelitis and surrounding right foot cellulitis  Treated with cefazolin/Flagyl empirically  5/16: Underwent right partial fifth ray resection: presumed surgical cure  Wound care/VAC per podiatry: Removed 5/17 secondary to bleeding  Upon recheck patient's wound had significant drainage with surrounding cellulitis  Current cultures with Serratia/Enterococcus, and now Pseudomonas  Antibiotics changed to IV Zosyn  ID consulted  Discussed with podiatry: Plans to return to the OR once volume status optimized after IV diuresis: Potentially tomorrow  Postoperative anemia  Prior baseline hemoglobin appears to range 10-12  Postprocedure hemoglobin decreased to 7.0 when patient was symptomatic: Secondary to expected procedural blood loss  On 5/18 transfused 1 unit PRBC   Repeat hemoglobin 9's  Hemoglobin stable    Results from last 7 days   Lab Units 05/20/25  0603 05/19/25  0548 05/18/25  0440   HEMOGLOBIN g/dL 9.6* 9.0* 7.0*   MCV fL 92 91 94     Type 2 diabetes mellitus with complication, without long-term current use of insulin ([facility])  Lab Results   Component Value Date    HGBA1C 9.3 (H) 04/09/2025     Recent Labs     05/19/25  1058 05/19/25  1626 05/19/25  2126 05/20/25  0751   POCGLU 210* 197* 144* 87     Prior to admission meds: glipizide, linagliptin and metformin  A1c 9.3.  Endocrinology consulted by vascular surgery  Oral agents on hold preoperatively  Accu-Cheks and sliding scale insulin  PAF (paroxysmal atrial fibrillation) ([facility])  Follows with [facility] cardiology: Most recent  "note 4/25 was reviewed  Currently in sinus rhythm  Continue atenolol 25 mg daily.  Anticoagulation: Apixaban was on hold with heparin infusion.  Apixaban has been restarted for paroxysmal atrial fibrillation in the setting of previous stroke  Essential hypertension  Continue amlodipine atenolol 25 mg daily and lisinopril 20 mg twice daily  Mild intermittent asthma without complication  [facility]  No exacerbation  Albuterol metered-dose inhaler as needed  Pruritus  Chronic pruritus follows with dermatology as an outpatient on prednisone 5 mg daily  Chronic heart failure with preserved ejection fraction (HFpEF) ([facility])  Last known LVEF 50% echocardiogram 2024  Patient being treated for volume overload secondary to blood transfusion/IV fluids as described above  Lung nodule  Chest x-ray with incidental finding of \"nodular opacity at left base\"  Outpatient chest x-ray in 6 weeks    VTE Pharmacologic Prophylaxis: VTE Score: 5 High Risk (Score >/= 5) - Pharmacological DVT Prophylaxis Ordered: apixaban (Eliquis). Sequential Compression Devices Ordered.    Mobility:   Basic Mobility Inpatient Raw Score: 10  JH-HLM Goal: 4: Move to chair/commode  JH-HLM Achieved: 4: Move to chair/commode  JH-HLM Goal achieved. Continue to encourage appropriate mobility.    Patient Centered Rounds: I performed bedside rounds with nursing staff today.   Discussions with Specialists or Other Care Team Provider: MELISSA.  D/w Vascular surgery: DESI LINDO.  D/w ID: Dr Castaneda- will see in consultation.  D/w Cardiology PAIGE Payen.  D/w Podiatry :Dr. Arce    Education and Discussions with Family / Patient: Updated patient at the bedside.  Called and updated wife via phone.  Reviewed test results, and treatment plan.  Answered all questions.  Discussed pacifically with patient and his wife the need for IV diuretics and potential OR tomorrow    Current Length of Stay: 9 day(s)  Current Patient Status: Inpatient   Certification " Statement: The patient will continue to require additional inpatient hospital stay due to IV antibiotics, return to the OR for osteomyelitis and cellulitis  Discharge Plan: Anticipate discharge in >72 hrs to rehab facility.    Code Status: Level 1 - Full Code    Subjective   Patient notes he was having significant pain in his foot earlier.  Improved after IV pain medicine.  Denies any pain anywhere else.  Denies any chest pain.  Denies any shortness of breath or cough.  Denies any abdominal pain.  Denies any nausea, vomiting, diarrhea and constipation.  Notes 2 days ago he had copious bowel movements.  Not currently.  Notes he is tolerating p.o.  Denies any dizziness or lightheadedness.    Patient states he refused both doses of Lasix yesterday as he was passing large amounts of urine.  He states he did not feel he needed the medication.  He notes he is now agreeable    Objective :  Temp:  [98.8 °F (37.1 °C)-99.9 °F (37.7 °C)] 98.8 °F (37.1 °C)  HR:  [71-98] 98  BP: (109-131)/(55-67) 131/67  Resp:  [17-19] 19  SpO2:  [87 %-94 %] 92 %  O2 Device: None (Room air)    Body mass index is 25.27 kg/m².     Input and Output Summary (last 24 hours):     Intake/Output Summary (Last 24 hours) at 5/20/2025 0938  Last data filed at 5/20/2025 0601  Gross per 24 hour   Intake 540 ml   Output 400 ml   Net 140 ml       Physical Exam  General: Very pleasant male.  No acute distress.  Nontachypneic and nondyspneic  Heart: Regular rate and rhythm.  S1-S2 present.  No murmur, rub, gallop  Lungs: Decreased breath sounds in both mid and lower lung fields.  No wheezes, crackles, rhonchi.  No accessory muscle use or respiratory distress  Abdomen: Soft, nontender with palpation.  Nondistended.  Normoactive bowel sounds present.  No guarding or rebound.  No peritoneal signs or mass  Extremities: Right lower extremity dressing in place  Neurologic: Awake and alert.  Oriented.  Interactive.  No somnolence or lethargy.  Cooperates with  exam    Lines/Drains:            Lab Results: I have reviewed the following results:   Results from last 7 days   Lab Units 25  0603   WBC Thousand/uL 13.20*   HEMOGLOBIN g/dL 9.6*   HEMATOCRIT % 30.2*   PLATELETS Thousands/uL 520*   SEGS PCT % 69   LYMPHO PCT % 19   MONO PCT % 9   EOS PCT % 1     Results from last 7 days   Lab Units 25  0603 25  0542 25  0545   SODIUM mmol/L 134*   < > 134*   POTASSIUM mmol/L 3.8   < > 4.0   CHLORIDE mmol/L 99   < > 103   CO2 mmol/L 26   < > 25   BUN mg/dL 10   < > 14   CREATININE mg/dL 0.95   < > 0.87   ANION GAP mmol/L 9   < > 6   CALCIUM mg/dL 8.5   < > 8.1*   ALBUMIN g/dL  --   --  2.6*   TOTAL BILIRUBIN mg/dL  --   --  0.30   ALK PHOS U/L  --   --  51   ALT U/L  --   --  4*   AST U/L  --   --  12*   GLUCOSE RANDOM mg/dL 70   < > 167*    < > = values in this interval not displayed.         Results from last 7 days   Lab Units 25  0751 25  2126 25  1626 25  1058 25  0730 25  2109 25  1604 25  1126 25  0734 25  0658 25  2105 25  1739   POC GLUCOSE mg/dl 87 144* 197* 210* 90 182* 227* 185* 77 55* 158* 206*               Recent Cultures (last 7 days):   Results from last 7 days   Lab Units 25  1337   GRAM STAIN RESULT  Rare Gram positive cocci in pairs*  No polys seen*   WOUND CULTURE  3+ Growth of Serratia marcescens*  3+ Growth of Enterococcus faecalis*  1+ Growth of Pseudomonas aeruginosa*       ============================================  Imagin/18 chest x-ray  moderate interstitial edema.  Nodular opacity at the left base, not visible on prior studies. Recommend follow-up with a chest radiograph with dual energy subtraction in 6 weeks to assure resolution      right foot x-rayPostop right foot.  No acute osseous abnormality.      vein mapping  RIGHT LOWER LIMB:   The great saphenous vein is patent  from the groin to the ankle.   The intraluminal diameter  measurements range from 2.2 mm to 12.6 mm throughout.   LEFT LOWER LIMB:   The great saphenous vein is patent  from the groin to the ankle.   The intraluminal diameter measurements range from 2.1 mm to 9 mm throughout.      5/7 right foot x-ray: Ulceration along the lateral aspect of the foot and soft tissue swelling along the dorsum of the foot without radiographic findings of osteomyelitis.      5/7 venous duplex  RIGHT LOWER LIMB   No evidence of acute or chronic deep vein thrombosis.   No evidence of superficial thrombophlebitis noted.   Doppler evaluation shows a normal response to augmentation maneuvers.   Popliteal, posterior tibial and anterior tibial arterial Doppler waveforms are monophasic (Known PAD).   Note: There is a well-defined hypoechoic non-vascularized cystic-type structure noted in the popliteal fossa.      LEFT LOWER LIMB LIMITED   Evaluation shows no evidence of thrombus in the common femoral vein.    Doppler evaluation shows a normal response to augmentation maneuvers.     5/6 CTA abdomen with runoff  1. Focal high-grade stenosis of distal right CFA and diffuse atherosclerotic disease of right SFA resulting in moderate to severe stenoses with focal near complete occlusions at the proximal and distal segments.  2. Short segment occlusions of proximal right anterior tibial and peroneal arteries with reconstitution in the medial to distal segments. Right posterior tibial artery is occluded.  3. Occluded left SFA with reconstitution in the distal segment. Left anterior tibial and posterior tibial arteries are occluded. One-vessel runoff of the left lower extremity through the peroneal artery.  4. Focal high-grade stenosis at the proximal right renal artery.        Microbiology  5/16 anaerobic culture: Negative  5/16 wound culture: 3+ Serratia.  3+ Enterococcus faecalis,pseudomonas.  5/7 blood culture: Negative x 2        =============================================    Last 24 Hours Medication  List:     Current Facility-Administered Medications:     acetaminophen (TYLENOL) tablet 975 mg, Q8H SANDRA    albuterol (PROVENTIL HFA,VENTOLIN HFA) inhaler 1 puff, Q4H PRN    amLODIPine (NORVASC) tablet 5 mg, BID    apixaban (ELIQUIS) tablet 5 mg, BID    atenolol (TENORMIN) tablet 25 mg, Daily    atorvastatin (LIPITOR) tablet 40 mg, QPM    Budeson-Glycopyrrol-Formoterol 160-9-4.8 MCG/ACT AERO 2 puff, BID    ceFAZolin (ANCEF) IVPB (premix in dextrose) 2,000 mg 50 mL, Q8H, Last Rate: 2,000 mg (05/20/25 0601)    clopidogrel (PLAVIX) tablet 75 mg, Daily    docusate sodium (COLACE) capsule 100 mg, BID    [Held by provider] famotidine (PEPCID) tablet 20 mg, BID    furosemide (LASIX) injection 40 mg, BID (diuretic)    glipiZIDE (GLUCOTROL XL) 24 hr tablet 10 mg, BID AC    insulin lispro (HumALOG/ADMELOG) 100 units/mL subcutaneous injection 1-5 Units, TID AC **AND** Fingerstick Glucose (POCT), TID AC    insulin lispro (HumALOG/ADMELOG) 100 units/mL subcutaneous injection 1-5 Units, HS    levalbuterol (XOPENEX) inhalation solution 1.25 mg, Q6H PRN    lidocaine (LIDODERM) 5 % patch 1 patch, Daily    lisinopril (ZESTRIL) tablet 20 mg, BID    [Held by provider] metFORMIN (GLUCOPHAGE-XR) 24 hr tablet 500 mg, Daily With Dinner    metroNIDAZOLE (FLAGYL) tablet 500 mg, Q12H SANDRA    montelukast (SINGULAIR) tablet 10 mg, HS    morphine injection 2 mg, Q4H PRN    multivitamin-minerals (CENTRUM) tablet 1 tablet, Daily    ondansetron (ZOFRAN) injection 4 mg, Q6H PRN    oxyCODONE (ROXICODONE) IR tablet 5 mg, Q4H PRN **OR** oxyCODONE (ROXICODONE) immediate release tablet 10 mg, Q4H PRN    polyethylene glycol (MIRALAX) packet 17 g, Daily PRN    predniSONE tablet 5 mg, Daily    senna (SENOKOT) tablet 8.6 mg, Daily    Administrative Statements   Today, Patient Was Seen By: Zulema Pitt MD      **Please Note: This note may have been constructed using a voice recognition system.**

## 2025-05-20 NOTE — UTILIZATION REVIEW
Continued Stay Review    Date: 5/20/25                          Current Patient Class: Inpatient  Current Level of Care: MED-SURG    HPI:79 y.o. male initially admitted on 5/11.     Current Diagnosis:osteomyelitis and cellulitis     Assessment/Plan: significant pain in his foot earlier. Improved after IV pain medicine. Denies any pain anywhere else. Denies any chest pain. Denies any shortness of breath or cough. Denies any abdominal pain. Denies any nausea, vomiting, diarrhea and constipation. Notes 2 days ago he had copious bowel movements. Not currently. Notes he is tolerating p.o. Denies any dizziness or lightheadedness. episode of dyspnea after blood transfusion. Hemoglobin stable. WBC 13.20. elevated trop due to volume overload. HGB 9.6, HCT 30.2. status post right fifth ray amputation.   wound had significant drainage with surrounding cellulitis. 5/16 intraoperative cultures: Serratia/Enterococcus and now pseudomonas. .. O2 SAT 88%. Zosyn.Plan for return to OR once optimized from volume status- possibly in am.refused both doses of IV Lasix yesterday: Notes he is agreeable to receive them today.Continue IV diuresis. SSI. ID consult.      SURGERY DATE: 5/13/2025     Preop Diagnosis:  Peripheral arterial disease (HCC) [I73.9]     Post-Op Diagnosis Codes:     * Peripheral arterial disease (HCC) [I73.9]     Procedure(s):  -Extended right common femoral endarterectomy onto the proximal SFA with bovine pericardial patch   -Right lower extremity angiogram   -Third order cannulation of the right anterior tibial artery   -Balloon angioplasty of the right anterior tibial with a 3x220 Fernando balloon   -DCB angioplasty of the SFA with a 6x150 Bock balloon   -Stenting of the right SFA with two 6x150 Grazyna stents. 6x5cm Viabahn. and a 7x5cm viabahn   -Post-dilation of stents with a 6mm and 7mm balloon   -Application of Prevena negative pressure wound vac        Anesthesia Type:   General     Operative  Indications:  Peripheral arterial disease (HCC) [I73.9]  78 yo M with non-healing right lateral foot wound complicated by infection with cellulitis. Patient requires podiatric intervention but prior will need revascularization to optimize perfusion. CTA performed and reviewed. Plan for Right common femoral endarterectomy and antegrade endovascular intervention possible bypass     Operative Findings:  -Right common femoral endarterectomy with extension onto the proximal SFA with 14cm bovine pericardial patch angioplasty  -Right SFA stenting with two 6x150 Grazyna stents distally, a 6x5cm Viabahn proximally to cover thin walled area of bleeding, and 7x5cm Viabahn into the patched area (post dilated stents with 6mm balloon distally and 7mm proximally)  -Right AT angioplasty with 3mm balloon     -right DP stenosis able to be crossed with wire but unable to cross with catheters or balloon. Unsuccessful attempt at DP angioplasty with 2mm balloon     Other than the DP stenosis there is inline flow to the foot        Complications:   None          SURGERY DATE: 5/16/2025    Preop Diagnosis:  Ulcer of right foot with fat layer exposed (HCC) [L97.512]     Post-Op Diagnosis Codes:     * Ulcer of right foot with fat layer exposed (HCC) [L97.512]     Procedure(s):  Right - RAY RESECTION FOOT - R partial 5th ray resection    Anesthesia Type:   Choice     Operative Indications:  Ulcer of right foot with fat layer exposed (HCC) [L97.512]        Operative Findings:  1) noted soft, necrotic bone at the fifth metatarsal head consistent with osteomyelitis.  Partial fifth ray resection performed, residual fifth metatarsal stump of good heart quality.  Will plan to assume surgical cure of osteomyelitis.  2) significant necrotic tissue noted within wound bed.  Noted yellow viscous fluid within the metatarsal phalangeal joint consistent with infection.  3) given above-noted infection and large soft tissue defect decision to apply wound VAC  was made intraoperatively.  4) will plan for wound VAC change Monday, 5/18/2025.  At this time we will decide for continued VAC therapy versus repeat washout of the right foot wound.  5) partial weightbearing to right foot.  6)Infection was present at time of surgery as evidenced by infected fluid/tissue and osteomyelitis.  Deep operative cultures taken to tailor antibiotics.        Complications:   None      Medications:   Scheduled Medications:  acetaminophen, 975 mg, Oral, Q8H SANDRA  amLODIPine, 5 mg, Oral, BID  apixaban, 5 mg, Oral, BID  atenolol, 25 mg, Oral, Daily  atorvastatin, 40 mg, Oral, QPM  Budeson-Glycopyrrol-Formoterol, 2 puff, Inhalation, BID  clopidogrel, 75 mg, Oral, Daily  docusate sodium, 100 mg, Oral, BID  famotidine, 20 mg, Oral, BID  furosemide, 40 mg, Intravenous, BID (diuretic)  [Held by provider] glipiZIDE, 10 mg, Oral, BID AC  insulin lispro, 1-5 Units, Subcutaneous, TID AC  insulin lispro, 1-5 Units, Subcutaneous, HS  lidocaine, 1 patch, Topical, Daily  lisinopril, 20 mg, Oral, BID  [Held by provider] metFORMIN, 500 mg, Oral, Daily With Dinner  montelukast, 10 mg, Oral, HS  multivitamin-minerals, 1 tablet, Oral, Daily  piperacillin-tazobactam, 4.5 g, Intravenous, Q8H  predniSONE, 5 mg, Oral, Daily  senna, 1 tablet, Oral, Daily      Continuous IV Infusions:                heparin (porcine) 25,000 units in 0.45% NaCl 250 mL infusion (premix)  Rate: 2.1-14 mL/hr Dose: 3-20 Units/kg/hr  Weight Dosing Info: 70 kg (Order-Specific)  Freq: Titrated Route: IV  Last Dose: Stopped (05/16/25 0624)  Start: 05/14/25 0145 End: 05/16/25 1007   Admin Instructions:      Order specific questions:          0220     0921     1626     2000     2259      0017     1119 [C]     6637     7747 [C]      6671     1007-D/C'd          heparin (porcine) 25,000 units in 0.45% NaCl 250 mL infusion (premix)  Rate: 2.1-21 mL/hr Dose: 3-30 Units/kg/hr  Weight Dosing Info: 70 kg (Order-Specific)  Freq: Titrated Route: IV  Last  Dose: Stopped (05/13/25 0617)  Start: 05/11/25 2300 End: 05/13/25 0600   Admin Instructions:      Order specific questions:       2331      0008     0610     2130      0600-D/C'd  0617 [C]               heparin (porcine) 25,000 units in 0.45% NaCl 250 mL infusion (premix)  Rate: 2.1-21 mL/hr Dose: 3-30 Units/kg/hr  Weight Dosing Info: 70 kg (Order-Specific)  Freq: Titrated Route: IV  Last Dose: 34.286 Units/kg/hr (05/11/25 1504)  Start: 05/07/25 1115 End: 05/11/25 2239   Admin Instructions:      Order specific questions:       0630     0801     0820     1504     2239 [C]     2239-D/C'd               heparin 1000 units in 500 mL infusion (premix) for sheath patency  Freq: Intra-op continuous PRN  Last Dose: Stopped (05/13/25 1941)  Start: 05/13/25 1317 End: 05/13/25 1837      1316     1317     1941 [C]               lactated ringers infusion  Freq: Continuous PRN Route: IV  Last Dose: Stopped (05/16/25 1355)  Start: 05/16/25 1304 End: 05/16/25 1403         1304     1355     1403-D/C'd          lactated ringers infusion  Rate: 75 mL/hr Dose: 75 mL/hr  Freq: Continuous Route: IV  Indications of Use: IV Hydration  Last Dose: Stopped (05/17/25 1515)  Start: 05/16/25 1300 End: 05/17/25 1454         1256     2138      1254     1454-D/C'd  1515 [C]           lactated ringers infusion  Rate: 125 mL/hr Dose: 125 mL/hr  Freq: Continuous Route: IV  Indications of Use: IV Hydration  Last Dose: Stopped (05/15/25 0708)  Start: 05/14/25 2300 End: 05/15/25 1341       2309      0708     1341-D/C'd  1345 [C]             lactated ringers infusion  Rate: 125 mL/hr Dose: 125 mL/hr  Freq: Continuous Route: IV  Indications of Use: IV Hydration  Last Dose: Stopped (05/13/25 1941)  Start: 05/13/25 1130 End: 05/13/25 1927      1153     1256     1338     1426     1515     1545     1617     1659     1800     1845     1927-D/C'd  1941 [C]                        PRN Meds:  albuterol, 1 puff, Inhalation, Q4H PRN  levalbuterol, 1.25 mg,  Nebulization, Q6H PRN  morphine injection, 2 mg, Intravenous, Q4H PRN 5/13 X 1, 5/16 X 1, 5/18 X 1, 5/20 X 1  ondansetron, 4 mg, Intravenous, Q6H PRN 5/16 X 1  oxyCODONE, 5 mg, Oral, Q4H PRN   Or  oxyCODONE, 10 mg, Oral, Q4H PRN 5/14 X3, 5/16 X1, 5/17 X 3, 5/19 X 4, 5/20 X 2  polyethylene glycol, 17 g, Oral, Daily PRN      Discharge Plan: TBD    Vital Signs (last 3 days)       Date/Time Temp Pulse Resp BP MAP (mmHg) SpO2 Calculated FIO2 (%) - Nasal Cannula O2 Flow Rate (L/min) Nasal Cannula O2 Flow Rate (L/min) O2 Device Patient Position - Orthostatic VS [place] Coma Scale Score Pain    05/20/25 1340 -- -- -- -- -- -- -- -- -- -- -- -- 7 05/20/25 11:22:13 98.3 °F (36.8 °C) 91 -- 124/66 85 88 % -- -- -- -- -- -- --    05/20/25 08:58:41 -- 98 -- 131/67 88 92 % -- -- -- -- -- -- --    05/20/25 0840 -- -- -- -- -- -- -- -- -- None (Room air) -- 15 4    05/20/25 07:53:12 98.8 °F (37.1 °C) 83 -- 119/60 80 87 % -- -- -- -- -- -- --    05/20/25 0556 -- -- -- -- -- -- -- -- -- -- -- -- 8 05/20/25 0555 -- -- -- -- -- -- -- -- -- -- -- -- 8 05/20/25 03:07:41 98.9 °F (37.2 °C) 71 -- 120/57 78 90 % -- -- -- -- -- -- --    05/19/25 23:10:33 99.9 °F (37.7 °C) 82 -- 117/63 81 91 % -- -- -- -- -- -- --    05/19/25 23:10:20 99.9 °F (37.7 °C) 79 -- 117/63 81 89 % -- -- -- -- -- -- --    05/19/25 2159 -- -- -- -- -- -- -- -- -- -- -- -- 8 05/19/25 2157 -- -- -- -- -- -- -- -- -- -- -- -- 8 05/19/25 21:24:39 99.6 °F (37.6 °C) 85 -- 109/57 74 94 % -- -- -- -- -- -- --    05/19/25 2030 -- -- -- -- -- -- -- -- -- None (Room air) -- 15 8    05/19/25 19:08:53 99.1 °F (37.3 °C) 89 19 116/64 81 88 % -- -- -- -- -- -- --    05/19/25 1715 -- -- -- -- -- -- -- -- -- -- -- -- 8 05/19/25 15:14:34 99.1 °F (37.3 °C) 82 -- 123/64 84 93 % -- -- -- -- -- -- --    05/19/25 1454 -- -- -- -- -- -- -- -- -- -- -- -- 6    05/19/25 1450 -- 82 -- 109/55 -- -- -- -- -- -- -- -- --    05/19/25 1253 -- -- -- -- -- -- -- -- -- -- -- -- 7     05/19/25 10:59:33 98.8 °F (37.1 °C) 82 17 109/55 73 92 % -- -- -- -- -- -- --    05/19/25 0942 -- -- -- -- -- -- -- -- -- -- -- -- 7 05/19/25 0940 -- -- -- -- -- -- -- -- -- -- -- -- 7 05/19/25 0842 -- -- -- -- -- -- -- -- -- -- -- -- 9 05/19/25 08:37:01 -- 106 -- 130/69 89 91 % -- -- -- -- -- -- --    05/19/25 0830 -- -- -- -- -- -- -- -- -- None (Room air) -- 15 6    05/19/25 07:28:46 98.2 °F (36.8 °C) 90 16 127/69 88 90 % -- -- -- -- Lying -- --    05/19/25 06:05:04 98.9 °F (37.2 °C) 89 16 127/68 88 91 % -- -- -- -- Lying -- --    05/19/25 0534 -- -- -- -- -- -- -- -- -- -- -- -- 5 05/19/25 02:45:11 98.3 °F (36.8 °C) 96 18 138/73 95 93 % -- -- -- -- Lying -- --    05/18/25 2332 -- -- -- -- -- -- -- -- -- -- -- -- 5 05/18/25 23:18:38 -- 95 16 127/71 90 97 % -- -- -- -- -- -- --    05/18/25 2058 -- -- -- -- -- -- -- -- -- -- -- -- 2 05/18/25 2027 -- -- -- -- -- 94 % -- -- -- -- -- -- --    05/18/25 19:37:33 98.5 °F (36.9 °C) 88 16 139/73 95 95 % -- -- -- -- Lying -- --    05/18/25 19:37:03 98.5 °F (36.9 °C) 89 16 139/73 95 93 % -- -- -- -- -- -- --    05/18/25 1515 98.7 °F (37.1 °C) 83 14 126/65 -- -- -- -- -- -- Lying -- --    05/18/25 14:51:42 98.5 °F (36.9 °C) 83 16 115/63 80 97 % -- -- -- -- -- -- --    05/18/25 1409 -- -- -- -- -- -- -- -- -- -- -- -- 5    05/18/25 1300 98.8 °F (37.1 °C) 84 18 122/69 -- -- -- -- -- -- -- -- --    05/18/25 12:31:12 98.5 °F (36.9 °C) 81 18 121/62 82 94 % -- -- -- -- -- -- --    05/18/25 11:27:23 98.1 °F (36.7 °C) 81 17 107/61 76 90 % -- -- -- -- -- -- --    05/18/25 0900 -- -- -- -- -- -- 28 -- 2 L/min Nasal cannula -- 15 No Pain    05/18/25 06:57:28 98 °F (36.7 °C) 85 -- 134/66 89 97 % -- -- -- -- -- -- --    05/18/25 0600 -- -- -- -- -- -- -- -- -- -- -- -- 6    05/18/25 0500 -- -- -- -- -- -- 28 2 L/min 2 L/min Nasal cannula -- -- --    05/18/25 0325 -- -- -- -- -- -- -- -- -- -- -- -- 9    05/18/25 0257 -- -- -- -- -- -- -- -- -- -- -- -- 8    05/18/25  02:53:30 98.2 °F (36.8 °C) 88 16 107/61 76 93 % -- -- -- -- -- -- --    05/17/25 22:17:18 98.9 °F (37.2 °C) 85 18 122/66 85 95 % -- -- -- -- Lying -- --    05/17/25 2210 -- -- -- -- -- -- -- -- -- -- -- -- 7    05/17/25 2037 -- -- -- -- -- -- -- -- -- -- -- -- 8    05/17/25 1900 -- -- -- -- -- -- -- -- -- -- -- -- 10 - Worst Possible Pain    05/17/25 17:17:35 97.9 °F (36.6 °C) 90 -- 109/65 80 90 % 28 -- 2 L/min Nasal cannula -- -- --    05/17/25 14:54:41 98.3 °F (36.8 °C) 81 -- 110/64 79 92 % -- -- -- -- -- -- --    05/17/25 1255 -- -- -- -- -- -- -- -- -- -- -- -- 10 - Worst Possible Pain    05/17/25 1254 -- -- -- -- -- -- -- -- -- -- -- -- 10 - Worst Possible Pain    05/17/25 11:53:49 98.2 °F (36.8 °C) 84 -- 122/62 82 92 % -- -- -- -- -- -- --    05/17/25 0830 -- -- -- -- -- -- -- -- -- None (Room air) -- 15 2    05/17/25 07:27:46 98.5 °F (36.9 °C) 83 -- 113/61 78 95 % -- -- -- -- -- -- --    05/17/25 0617 -- -- -- -- -- -- -- -- -- -- -- -- 6    05/17/25 0431 -- -- -- -- -- -- -- -- -- -- -- -- 7    05/17/25 03:16:09 -- 81 16 110/61 77 98 % -- -- -- -- -- -- --          Weight (last 2 days)       Date/Time Weight    05/20/25 0600 84.5 (186.29)    05/19/25 1450 78.1 (172.18)    05/19/25 0600 78.1 (172.18)    05/18/25 0600 78.2 (172.4)            Pertinent Labs/Diagnostic Results:   Radiology:  XR chest portable   Final Interpretation by Staci Shah MD (05/19 7134)      Moderate interstitial edema.      Nodular opacity at the left base, not visible on prior studies. Recommend follow-up with a chest radiograph with dual energy subtraction in 6 weeks to assure resolution.      This study was marked in Epic for immediate notification and follow-up.            Workstation performed: [id number]         XR foot 3+ vw right   Final Interpretation by Brandon Brewer MD (05/16 5757)      Postop right foot.      No acute osseous abnormality.         Workstation performed: [id number]         XR or angio leg rt    Final Interpretation by Richard Schroeder MD (05/16 0813)      Fluoroscopy provided for procedure guidance.      Please refer to the separate procedure note for additional details.                  Workstation performed: [id number]           Cardiology:  Echo complete w/ contrast if indicated   Final Result by Richard Cifuentes DO (05/19 1629)   Addendum (preliminary) 1 of 1 by Richard Cifuentes DO (05/19 1629)        Left Ventricle: Left ventricular cavity size is normal. Wall thickness    is normal. The left ventricular ejection fraction is 50% by visual    estimation. Systolic function is low normal. Global longitudinal strain is    reduced at -11%. Diastolic function is mildly abnormal, consistent with    grade I (abnormal) relaxation.     The following segments are hypokinetic: mid anteroseptal and apical    septal.     All other segments are normal.     Left Atrium: The atrium is mildly dilated (35-41 mL/m2).     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     Aortic Valve: There is mild regurgitation. There is aortic valve    sclerosis.     Mitral Valve: There is mild annular calcification. There is mild    regurgitation with an anteriorly directed jet.     Tricuspid Valve: The right ventricular systolic pressure is mildly    elevated. The estimated right ventricular systolic pressure is 38.00 mmHg.            ECG 12 lead   Final Result by Axel Muñiz MD (05/19 5938)   Normal sinus rhythm   Nonspecific ST and T wave abnormality   Abnormal ECG   No previous ECGs available   Confirmed by Axel Muñiz ([id number]) on 5/19/2025 8:39:47 AM      ECG 12 lead   Final Result by Axel Muñiz MD (05/18 1557)   Normal sinus rhythm   ST & T wave abnormality, consider lateral ischemia   Abnormal ECG   When compared with ECG of  18-May-2025 18:31,   T wave inversion less evident in Lateral leads   Confirmed by Axel Muñiz ([id number]) on 5/18/2025 11:07:31 PM      ECG 12 lead   Final Result by Axel Muñiz MD (05/18 1946)   Normal sinus rhythm   ST & T wave abnormality, consider lateral ischemia   Abnormal ECG   When compared with ECG of 07-May-2025 08:59,   ST now depressed in Anterolateral leads   T wave inversion now evident in Anterolateral leads   Confirmed by Axel Muñiz ([id number]) on 5/18/2025 7:46:47 PM              Results from last 7 days   Lab Units 05/20/25  0603 05/19/25  0548 05/18/25  0440 05/17/25  1751 05/17/25  0542   WBC Thousand/uL 13.20* 11.33* 9.66  --  10.46*   HEMOGLOBIN g/dL 9.6* 9.0* 7.0* 7.7* 7.2*   HEMATOCRIT % 30.2* 27.6* 21.9* 23.9* 23.0*   PLATELETS Thousands/uL 520* 399* 307  --  264   TOTAL NEUT ABS Thousands/µL 9.11*  --   --   --   --          Results from last 7 days   Lab Units 05/20/25  0603 05/19/25  0548 05/18/25  0440 05/17/25  0542 05/16/25  0545 05/15/25  0445 05/14/25  0436 05/13/25  1512   SODIUM mmol/L 134* 134* 134* 136 134*   < > 134*  --    POTASSIUM mmol/L 3.8 4.1 4.0 4.5 4.0   < > 4.6  --    CHLORIDE mmol/L 99 99 103 104 103   < > 104  --    CO2 mmol/L 26 28 26 27 25   < > 23  --    CO2, I-STAT mmol/L  --   --   --   --   --   --   --  22   ANION GAP mmol/L 9 7 5 5 6   < > 7  --    BUN mg/dL 10 9 10 11 14   < > 14  --    CREATININE mg/dL 0.95 0.98 0.83 0.90 0.87   < > 1.13  --    EGFR ml/min/1.73sq m 75 73 83 80 82   < > 61  --    CALCIUM mg/dL 8.5 8.7 8.0* 8.3* 8.1*   < > 8.1*  --    CALCIUM, IONIZED, ISTAT mmol/L  --   --   --   --   --   --   --  1.09*   MAGNESIUM mg/dL  --   --   --   --   --   --  2.1  --    PHOSPHORUS mg/dL  --   --   --   --   --   --  4.0  --     < > = values in this interval not displayed.     Results from last 7 days   Lab Units 05/16/25  0545 05/14/25  0436   AST U/L 12* 31   ALT U/L 4* 15   ALK PHOS U/L 51 52   TOTAL PROTEIN g/dL 5.4* 5.6*   ALBUMIN  g/dL 2.6* 2.9*   TOTAL BILIRUBIN mg/dL 0.30 0.38     Results from last 7 days   Lab Units 05/20/25  1130 05/20/25  0751 05/19/25  2126 05/19/25  1626 05/19/25  1058 05/19/25  0730 05/18/25  2109 05/18/25  1604 05/18/25  1126 05/18/25  0734 05/18/25  0658 05/17/25  2105   POC GLUCOSE mg/dl 148* 87 144* 197* 210* 90 182* 227* 185* 77 55* 158*     Results from last 7 days   Lab Units 05/20/25  0603 05/19/25  0548 05/18/25  0440 05/17/25  0542 05/16/25  0545 05/15/25  0445 05/14/25  0436   GLUCOSE RANDOM mg/dL 70 89 61* 87 167* 228* 242*       Results from last 7 days   Lab Units 05/13/25  1512   I STAT BASE EXC mmol/L -3*   I STAT O2 SAT % 100*   ISTAT PH ART  7.381   I STAT ART PCO2 mm HG 35.8*   I STAT ART PO2 mm .0*   I STAT ART HCO3 mmol/L 21.2*         Results from last 7 days   Lab Units 05/19/25  0030 05/18/25  2200 05/18/25  1934   HS TNI 0HR ng/L  --   --  163*   HS TNI 2HR ng/L  --  155*  --    HSTNI D2 ng/L  --  -8  --    HS TNI 4HR ng/L 193*  --   --    HSTNI D4 ng/L 30*  --   --          Results from last 7 days   Lab Units 05/15/25  2314 05/15/25  1636 05/15/25  1019   PTT seconds 53* 47* 52*       Results from last 7 days   Lab Units 05/19/25  0558   BNP pg/mL 899*     Results from last 7 days   Lab Units 05/17/25  0542   IRON SATURATION % 11*   IRON ug/dL 18*   TIBC ug/dL 163.8*     Results from last 7 days   Lab Units 05/17/25  0542   TRANSFERRIN mg/dL 117*     Results from last 7 days   Lab Units 05/19/25  0530 05/17/25  0832   UNIT PRODUCT CODE  [id number]  [id number]   UNIT NUMBER  [id number]  [id number]   UNITABO  O O  O   UNITRH  NEG POS  POS   CROSSMATCH  Compatible Compatible  Compatible   UNIT DISPENSE STATUS  Presumed Trans Return to Inv  Return to Inv   UNIT PRODUCT VOL mL 250 350  350       Results from last 7 days   Lab Units 05/16/25  1337   GRAM STAIN RESULT  Rare Gram positive cocci in pairs*  No polys seen*   WOUND CULTURE  3+ Growth of  Serratia marcescens*  3+ Growth of Enterococcus faecalis*  1+ Growth of Pseudomonas aeruginosa*       Network Utilization Review Department  ATTENTION: Please call with any questions or concerns to [phone] and carefully listen to the prompts so that you are directed to the right person. All voicemails are confidential.   For Discharge needs, contact Care Management DC Support Team at [phone] opt. [phone]  Send all requests for admission clinical reviews, approved or denied determinations and any other requests to dedicated fax number below belonging to the [facility] where the patient is receiving treatment. List of dedicated fax numbers for the Facilities:  FACILITY NAME UR FAX NUMBER   ADMISSION DENIALS (Administrative/Medical Necessity) [phone]   DISCHARGE SUPPORT TEAM (NETWORK) [phone]   PARENT CHILD HEALTH (Maternity/NICU/Pediatrics) [phone]   [facility] [phone]   [facility] [phone]   [facility] [phone]   [facility] [phone]   [facility] [phone]   [facility] [phone]   [facility] [phone]   [facility] [phone]   [facility] [phone]   [facility] [phone]   [facility] [phone]   [facility] [phone]

## 2025-05-20 NOTE — ASSESSMENT & PLAN NOTE
Right fifth metatarsal ulceration with underlying osteomyelitis and surrounding right foot cellulitis  Treated with cefazolin/Flagyl empirically  5/16: Underwent right partial fifth ray resection: presumed surgical cure  Wound care/VAC per podiatry: Removed 5/17 secondary to bleeding  Upon recheck patient's wound had significant drainage with surrounding cellulitis  Current cultures with Serratia/Enterococcus, and now Pseudomonas  Antibiotics changed to IV Zosyn  ID consulted  Discussed with podiatry: Plans to return to the OR once volume status optimized after IV diuresis: Potentially tomorrow

## 2025-05-20 NOTE — ASSESSMENT & PLAN NOTE
Prior to admission on [facility].  No exacerbation  Albuterol metered-dose inhaler as needed

## 2025-05-20 NOTE — ASSESSMENT & PLAN NOTE
- TTE 05/20/25: LVEF 50%, global longitudinal strain -11%, grade I DD, anteroseptal and apical septal hypokinesis, mild LAE, possible prominent eustachian valve or partial cor triatriatum sheryl, mild AR, aortic valve sclerosis, mild MAC, mild MR, PASP 38.00 mmHg  - TTE 10/01/24: LVEF 50%, mild hypokinesia of the posterior wall, grade I DD, mild AR   - BNP pending   - CXR 05/18/25: moderate interstitial edema, nodular opacity at the left base not visible on prior studies   - Neurohormonal Blockade:   -- beta blocker: atenolol 25 mg daily  -- ACE/ARB/ARNi: lisinopril 5 mg daily   -- diuretic: none   -- inpt diuretic: IV lasix 40 mg BID (started on 05/19/25)--> pt refused both doses of IV lasix yesterday

## 2025-05-20 NOTE — CONSULTS
Consultation - Infectious Disease   Name: Gold Van 79 y.o. male I MRN: [id number]  Unit/Bed#: [place] [address] -01 I Date of Admission: 5/11/2025   Date of Service: 5/20/2025 I Hospital Day: 9   Inpatient consult to Infectious Diseases  Consult performed by: Michael Castaneda MD  Consult ordered by: Zulema Pitt MD        Physician Requesting Evaluation: Zulema Pitt MD   Reason for Evaluation / Principal Problem: diabetic foot infection, osteomyelitis     Assessment & Plan  Sepsis without acute organ dysfunction (HCC)  Admit on initial presentation to [facility] with tachycardia and leukocytosis, secondary to diabetic right foot infection.  Blood cultures negative.  As below patient is with ongoing surgical site infection.  He is afebrile and hemodynamically stable currently but with leukocytosis.  -Antibiotic plan as below  -Repeat CBC tomorrow to trend WBC  -Monitor fever curve and hemodynamics  -Ongoing podiatry recommendations appreciated  -Supportive care per primary team  Ulcer of right foot with fat layer exposed secondary to osteomyelitis  Known chronic diabetic foot ulcer, MRI in April suggestive of osteomyelitis of fifth metatarsal head.  Admitted with acute wound cellulitis status post right partial fifth ray resection with assumed surgical cure of osteomyelitis.  There was yellow viscous fluid within the metatarsal phalangeal joint consistent with infection.  On dressing change 5/19 patient had ongoing green-colored drainage concerning for ongoing soft tissue infection.  Patient will need further right foot washout pending cardiology clearance.  OR cx from 5/16 grew Serratia, Enterococcus faecalis and Pseudomonas.  -Change antibiotic to IV Zosyn based on culture  -Await further podiatry washout  -Antibiotic duration will depend on degree of surgical source control  Osteomyelitis (HCC)  Of fifth metatarsal head in setting of diabetic foot ulcer.  Now status post partial fifth ray resection with  presumed surgical cure on 5/13.  -Follow-up eventual repeat podiatry washout  -Will need to confirm if there is any ongoing concern for residual bone infection  Severe peripheral arterial disease (HCC)  Status post vascular surgery intervention on 5/13 with right common femoral endarterectomy with bovine pericardial patch angioplasty, right SFA stenting, right angioplasty.  Type 2 diabetes mellitus with complication, without long-term current use of insulin ([facility])  Lab Results   Component Value Date    HGBA1C 9.3 (H) 04/09/2025   Significant risk factor for infection and poor wound healing.  -Tight glycemic control as per primary team  PAF (paroxysmal atrial fibrillation) ([facility])  On Eliquis at home.  Chronic heart failure with preserved ejection fraction (HFpEF) ([facility])  Cardiology following.    I have discussed the above management plan in detail with the primary service.     Antibiotics:  Cefazolin  Flagyl    History of Present Illness   Gold Van is a 79 y.o. year old male with A-fib on Eliquis, type 2 diabetes, PAD, hypertension.  He initially presented to [facility] on 5/7 with right foot wound infection.  He has followed with podiatry as an outpatient and had a recent MRI last month which confirmed fifth metatarsal head osteomyelitis.  Upon admission he was found to have acute wound infection/cellulitis of his foot.  He was transferred to [facility] on 5/11 for vascular surgery intervention.  He underwent right femoral endarterectomy with endovascular intervention on 5/13.  He had been on cefazolin and Flagyl since 5/08.  Initial blood cultures were negative.  He was taken to the OR on 5/16 with podiatry, per operative report there was soft necrotic bone at the fifth metatarsal head consistent with osteomyelitis.  A partial fifth ray resection was performed, residual fifth metatarsal stump was of good quality.  There was presumed surgical cure of osteomyelitis.  On follow-up  dressing change on 5/19 however there was ongoing active green drainage at surgical site.  Per podiatry patient will require further washout.    At this time patient reports he is having postoperative pain but it is controlled.  Denies any nausea, vomiting, diarrhea, fever or chills.  Known antibiotic allergies to sulfa drugs.    A complete review of systems is negative other than that noted in the HPI.    Medical History Review: I have reviewed the patient's PMH, PSH, Social History, Family History, Meds, and Allergies     Objective :  Temp:  [98.8 °F (37.1 °C)-99.9 °F (37.7 °C)] 98.8 °F (37.1 °C)  HR:  [71-98] 98  BP: (109-131)/(55-67) 131/67  Resp:  [17-19] 19  SpO2:  [87 %-94 %] 92 %  O2 Device: None (Room air)    General:  No acute distress  Psychiatric:  Awake and alert  Pulmonary:  Normal respiratory excursion without accessory muscle use  Abdomen:  Soft, nontender  Extremities: Right foot with intact dressing.  No edema or erythema of distal leg.  Skin:  No rashes        Lab Results: I have reviewed the following results:  Results from last 7 days   Lab Units 05/20/25  0603 05/19/25  0548 05/18/25  0440   WBC Thousand/uL 13.20* 11.33* 9.66   HEMOGLOBIN g/dL 9.6* 9.0* 7.0*   PLATELETS Thousands/uL 520* 399* 307     Results from last 7 days   Lab Units 05/20/25  0603 05/19/25  0548 05/18/25  0440 05/17/25  0542 05/16/25  0545 05/15/25  0445 05/14/25  0436 05/13/25  1512   0000   SODIUM mmol/L 134* 134* 134*   < > 134*   < > 134*  --   --    POTASSIUM mmol/L 3.8 4.1 4.0   < > 4.0   < > 4.6  --   --    CHLORIDE mmol/L 99 99 103   < > 103   < > 104  --   --    CO2 mmol/L 26 28 26   < > 25   < > 23  --   --    CO2, I-STAT mmol/L  --   --   --   --   --   --   --  22  --    BUN mg/dL 10 9 10   < > 14   < > 14  --   --    CREATININE mg/dL 0.95 0.98 0.83   < > 0.87   < > 1.13  --   --    EGFR ml/min/1.73sq m 75 73 83   < > 82   < > 61  --   --    GLUCOSE, ISTAT mg/dl  --   --   --   --   --   --   --  262*  --     CALCIUM mg/dL 8.5 8.7 8.0*   < > 8.1*   < > 8.1*  --   --    AST U/L  --   --   --   --  12*  --  31  --   --    ALT U/L  --   --   --   --  4*  --  15  --    < >   ALK PHOS U/L  --   --   --   --  51  --  52  --    < >   ALBUMIN g/dL  --   --   --   --  2.6*  --  2.9*  --    < >    < > = values in this interval not displayed.     Results from last 7 days   Lab Units 05/16/25  7839   GRAM STAIN RESULT  Rare Gram positive cocci in pairs*  No polys seen*   WOUND CULTURE  3+ Growth of Serratia marcescens*  3+ Growth of Enterococcus faecalis*  1+ Growth of Pseudomonas aeruginosa*       Imaging Results Review: I personally reviewed the following image studies in PACS and associated radiology reports: chest xray. My interpretation of the radiology images/reports is: Pulmonary interstitial edema.

## 2025-05-20 NOTE — ASSESSMENT & PLAN NOTE
Admit on initial presentation to [facility] with tachycardia and leukocytosis, secondary to diabetic right foot infection.  Blood cultures negative.  As below patient is with ongoing surgical site infection.  He is afebrile and hemodynamically stable currently but with leukocytosis.  -Antibiotic plan as below  -Repeat CBC tomorrow to trend WBC  -Monitor fever curve and hemodynamics  -Ongoing podiatry recommendations appreciated  -Supportive care per primary team

## 2025-05-20 NOTE — ASSESSMENT & PLAN NOTE
- met SIRS criteria at [facility] with 05/07/25 with tachycardia and leukocytosis   - 5/16: Underwent right partial fifth ray resection: presumed surgical cure  - s/p right CFA SFA endarterectomy/stenting 5/13/2025   - currently on Plavix 75 mg daily (podiatry and vascular following)

## 2025-05-20 NOTE — ASSESSMENT & PLAN NOTE
Follows with [facility] cardiology: Most recent note 4/25 was reviewed  Currently in sinus rhythm  Continue atenolol 25 mg daily.  Anticoagulation: Apixaban was on hold with heparin infusion.  Apixaban has been restarted for paroxysmal atrial fibrillation in the setting of previous stroke

## 2025-05-20 NOTE — ASSESSMENT & PLAN NOTE
Patient is a 79-year-old male with past medical history significant for DM2, PAF, asthma, and PAD who presented with right foot wound  Met sepsis criteria at [facility] initially  Diagnosed with right fifth metatarsal osteomyelitis with right foot cellulitis  Placed on cefazolin/metronidazole  Transferred here for vascular surgery  Now status post right fifth ray amputation 5/16  intraoperative cultures: Serratia/Enterococcus and now pseudomonas  Blood culture negative x 2  D/w ID:  will change abx to Zosyn  Plan for return to OR once optimized from volume status-  possibly in am

## 2025-05-21 ENCOUNTER — ANESTHESIA EVENT (INPATIENT)
Dept: PERIOP | Facility: HOSPITAL | Age: 80
End: 2025-05-21
Payer: COMMERCIAL

## 2025-05-21 LAB
ANION GAP SERPL CALCULATED.3IONS-SCNC: 8 MMOL/L (ref 4–13)
ANION GAP SERPL CALCULATED.3IONS-SCNC: 9 MMOL/L (ref 4–13)
ATRIAL RATE: 308 BPM
BASOPHILS # BLD AUTO: 0.04 THOUSANDS/ÂΜL (ref 0–0.1)
BASOPHILS NFR BLD AUTO: 0 % (ref 0–1)
BUN SERPL-MCNC: 12 MG/DL (ref 5–25)
BUN SERPL-MCNC: 18 MG/DL (ref 5–25)
CALCIUM SERPL-MCNC: 7.7 MG/DL (ref 8.4–10.2)
CALCIUM SERPL-MCNC: 8.2 MG/DL (ref 8.4–10.2)
CARDIAC TROPONIN I PNL SERPL HS: 159 NG/L (ref 8–18)
CHLORIDE SERPL-SCNC: 100 MMOL/L (ref 96–108)
CHLORIDE SERPL-SCNC: 98 MMOL/L (ref 96–108)
CO2 SERPL-SCNC: 24 MMOL/L (ref 21–32)
CO2 SERPL-SCNC: 26 MMOL/L (ref 21–32)
CREAT SERPL-MCNC: 0.98 MG/DL (ref 0.6–1.3)
CREAT SERPL-MCNC: 1.06 MG/DL (ref 0.6–1.3)
EOSINOPHIL # BLD AUTO: 0.1 THOUSAND/ÂΜL (ref 0–0.61)
EOSINOPHIL NFR BLD AUTO: 1 % (ref 0–6)
ERYTHROCYTE [DISTWIDTH] IN BLOOD BY AUTOMATED COUNT: 16 % (ref 11.6–15.1)
GFR SERPL CREATININE-BSD FRML MDRD: 66 ML/MIN/1.73SQ M
GFR SERPL CREATININE-BSD FRML MDRD: 73 ML/MIN/1.73SQ M
GLUCOSE SERPL-MCNC: 101 MG/DL (ref 65–140)
GLUCOSE SERPL-MCNC: 119 MG/DL (ref 65–140)
GLUCOSE SERPL-MCNC: 128 MG/DL (ref 65–140)
GLUCOSE SERPL-MCNC: 283 MG/DL (ref 65–140)
GLUCOSE SERPL-MCNC: 318 MG/DL (ref 65–140)
GLUCOSE SERPL-MCNC: 325 MG/DL (ref 65–140)
HCT VFR BLD AUTO: 28.4 % (ref 36.5–49.3)
HGB BLD-MCNC: 9.1 G/DL (ref 12–17)
IMM GRANULOCYTES # BLD AUTO: 0.17 THOUSAND/UL (ref 0–0.2)
IMM GRANULOCYTES NFR BLD AUTO: 2 % (ref 0–2)
LYMPHOCYTES # BLD AUTO: 1.56 THOUSANDS/ÂΜL (ref 0.6–4.47)
LYMPHOCYTES NFR BLD AUTO: 15 % (ref 14–44)
MAGNESIUM SERPL-MCNC: 1.9 MG/DL (ref 1.9–2.7)
MAGNESIUM SERPL-MCNC: 2 MG/DL (ref 1.9–2.7)
MCH RBC QN AUTO: 29.5 PG (ref 26.8–34.3)
MCHC RBC AUTO-ENTMCNC: 32 G/DL (ref 31.4–37.4)
MCV RBC AUTO: 92 FL (ref 82–98)
MONOCYTES # BLD AUTO: 0.89 THOUSAND/ÂΜL (ref 0.17–1.22)
MONOCYTES NFR BLD AUTO: 8 % (ref 4–12)
NEUTROPHILS # BLD AUTO: 7.89 THOUSANDS/ÂΜL (ref 1.85–7.62)
NEUTS SEG NFR BLD AUTO: 74 % (ref 43–75)
NRBC BLD AUTO-RTO: 0 /100 WBCS
PLATELET # BLD AUTO: 532 THOUSANDS/UL (ref 149–390)
PMV BLD AUTO: 9.3 FL (ref 8.9–12.7)
POTASSIUM SERPL-SCNC: 3.9 MMOL/L (ref 3.5–5.3)
POTASSIUM SERPL-SCNC: 4.2 MMOL/L (ref 3.5–5.3)
QRS AXIS: 76 DEGREES
QRSD INTERVAL: 82 MS
QT INTERVAL: 304 MS
QTC INTERVAL: 487 MS
RBC # BLD AUTO: 3.08 MILLION/UL (ref 3.88–5.62)
SODIUM SERPL-SCNC: 132 MMOL/L (ref 135–147)
SODIUM SERPL-SCNC: 133 MMOL/L (ref 135–147)
T WAVE AXIS: 261 DEGREES
VENTRICULAR RATE: 154 BPM
WBC # BLD AUTO: 10.65 THOUSAND/UL (ref 4.31–10.16)

## 2025-05-21 PROCEDURE — G0545 PR INHERENT VISIT TO INPT: HCPCS | Performed by: INTERNAL MEDICINE

## 2025-05-21 PROCEDURE — 80048 BASIC METABOLIC PNL TOTAL CA: CPT

## 2025-05-21 PROCEDURE — 99232 SBSQ HOSP IP/OBS MODERATE 35: CPT | Performed by: INTERNAL MEDICINE

## 2025-05-21 PROCEDURE — 84484 ASSAY OF TROPONIN QUANT: CPT | Performed by: INTERNAL MEDICINE

## 2025-05-21 PROCEDURE — 80048 BASIC METABOLIC PNL TOTAL CA: CPT | Performed by: INTERNAL MEDICINE

## 2025-05-21 PROCEDURE — 97110 THERAPEUTIC EXERCISES: CPT

## 2025-05-21 PROCEDURE — 99024 POSTOP FOLLOW-UP VISIT: CPT | Performed by: PODIATRIST

## 2025-05-21 PROCEDURE — 99233 SBSQ HOSP IP/OBS HIGH 50: CPT | Performed by: INTERNAL MEDICINE

## 2025-05-21 PROCEDURE — 85025 COMPLETE CBC W/AUTO DIFF WBC: CPT | Performed by: INTERNAL MEDICINE

## 2025-05-21 PROCEDURE — 82948 REAGENT STRIP/BLOOD GLUCOSE: CPT

## 2025-05-21 PROCEDURE — 93010 ELECTROCARDIOGRAM REPORT: CPT | Performed by: INTERNAL MEDICINE

## 2025-05-21 PROCEDURE — 83735 ASSAY OF MAGNESIUM: CPT

## 2025-05-21 PROCEDURE — 99024 POSTOP FOLLOW-UP VISIT: CPT | Performed by: NURSE PRACTITIONER

## 2025-05-21 PROCEDURE — 97530 THERAPEUTIC ACTIVITIES: CPT

## 2025-05-21 PROCEDURE — 93005 ELECTROCARDIOGRAM TRACING: CPT

## 2025-05-21 RX ORDER — METOPROLOL TARTRATE 1 MG/ML
5 INJECTION, SOLUTION INTRAVENOUS EVERY 6 HOURS PRN
Status: DISCONTINUED | OUTPATIENT
Start: 2025-05-21 | End: 2025-05-28 | Stop reason: HOSPADM

## 2025-05-21 RX ORDER — ATENOLOL 50 MG/1
25 TABLET ORAL DAILY
Status: DISCONTINUED | OUTPATIENT
Start: 2025-05-22 | End: 2025-05-28 | Stop reason: HOSPADM

## 2025-05-21 RX ORDER — FUROSEMIDE 10 MG/ML
40 INJECTION INTRAMUSCULAR; INTRAVENOUS
Status: DISCONTINUED | OUTPATIENT
Start: 2025-05-21 | End: 2025-05-24

## 2025-05-21 RX ADMIN — ATORVASTATIN CALCIUM 40 MG: 40 TABLET, FILM COATED ORAL at 17:38

## 2025-05-21 RX ADMIN — PIPERACILLIN AND TAZOBACTAM 4.5 G: 36; 4.5 INJECTION, POWDER, LYOPHILIZED, FOR SOLUTION INTRAVENOUS at 23:16

## 2025-05-21 RX ADMIN — INSULIN LISPRO 3 UNITS: 100 INJECTION, SOLUTION INTRAVENOUS; SUBCUTANEOUS at 21:33

## 2025-05-21 RX ADMIN — MONTELUKAST 10 MG: 10 TABLET, FILM COATED ORAL at 21:33

## 2025-05-21 RX ADMIN — DOCUSATE SODIUM 100 MG: 100 CAPSULE, LIQUID FILLED ORAL at 10:21

## 2025-05-21 RX ADMIN — SENNOSIDES 8.6 MG: 8.6 TABLET, FILM COATED ORAL at 10:20

## 2025-05-21 RX ADMIN — INSULIN LISPRO 3 UNITS: 100 INJECTION, SOLUTION INTRAVENOUS; SUBCUTANEOUS at 16:48

## 2025-05-21 RX ADMIN — BUDESONIDE, GLYCOPYRROLATE, AND FORMOTEROL FUMARATE 2 PUFF: 160; 9; 4.8 AEROSOL, METERED RESPIRATORY (INHALATION) at 10:22

## 2025-05-21 RX ADMIN — ACETAMINOPHEN 975 MG: 325 TABLET, FILM COATED ORAL at 21:32

## 2025-05-21 RX ADMIN — DOCUSATE SODIUM 100 MG: 100 CAPSULE, LIQUID FILLED ORAL at 17:38

## 2025-05-21 RX ADMIN — APIXABAN 5 MG: 5 TABLET, FILM COATED ORAL at 17:38

## 2025-05-21 RX ADMIN — BUDESONIDE, GLYCOPYRROLATE, AND FORMOTEROL FUMARATE 2 PUFF: 160; 9; 4.8 AEROSOL, METERED RESPIRATORY (INHALATION) at 21:32

## 2025-05-21 RX ADMIN — ACETAMINOPHEN 975 MG: 325 TABLET, FILM COATED ORAL at 13:03

## 2025-05-21 RX ADMIN — PREDNISONE 5 MG: 5 TABLET ORAL at 10:20

## 2025-05-21 RX ADMIN — PIPERACILLIN AND TAZOBACTAM 4.5 G: 36; 4.5 INJECTION, POWDER, LYOPHILIZED, FOR SOLUTION INTRAVENOUS at 16:51

## 2025-05-21 RX ADMIN — CLOPIDOGREL BISULFATE 75 MG: 75 TABLET, FILM COATED ORAL at 10:20

## 2025-05-21 RX ADMIN — ACETAMINOPHEN 975 MG: 325 TABLET, FILM COATED ORAL at 05:01

## 2025-05-21 RX ADMIN — METOPROLOL TARTRATE 5 MG: 5 INJECTION INTRAVENOUS at 20:09

## 2025-05-21 RX ADMIN — PIPERACILLIN AND TAZOBACTAM 4.5 G: 36; 4.5 INJECTION, POWDER, LYOPHILIZED, FOR SOLUTION INTRAVENOUS at 06:03

## 2025-05-21 RX ADMIN — LISINOPRIL 20 MG: 20 TABLET ORAL at 20:09

## 2025-05-21 RX ADMIN — FAMOTIDINE 20 MG: 20 TABLET, FILM COATED ORAL at 10:20

## 2025-05-21 RX ADMIN — APIXABAN 5 MG: 5 TABLET, FILM COATED ORAL at 10:21

## 2025-05-21 RX ADMIN — OXYCODONE HYDROCHLORIDE 10 MG: 10 TABLET ORAL at 13:04

## 2025-05-21 RX ADMIN — FAMOTIDINE 20 MG: 20 TABLET, FILM COATED ORAL at 17:39

## 2025-05-21 RX ADMIN — Medication 1 TABLET: at 10:20

## 2025-05-21 NOTE — ASSESSMENT & PLAN NOTE
Admit on initial presentation to [facility] with tachycardia and leukocytosis, secondary to diabetic right foot infection.  Blood cultures negative.  As below patient is with ongoing surgical site infection.  He is afebrile and hemodynamically stable currently but with leukocytosis.  -Antibiotic plan as below  -Repeat CBC tomorrow to trend WBC  -Monitor fever curve and hemodynamics  -Ongoing podiatry recommendations appreciated  -Supportive care per primary team

## 2025-05-21 NOTE — ASSESSMENT & PLAN NOTE
Right fifth metatarsal ulceration with underlying osteomyelitis and surrounding right foot cellulitis  Treated with cefazolin/Flagyl empirically  5/16: Underwent right partial fifth ray resection: presumed surgical cure  Wound care/VAC per podiatry: Removed 5/17 secondary to bleeding  Upon recheck patient's wound had significant drainage with surrounding cellulitis  Current cultures with Serratia/Enterococcus, and Pseudomonas  Antibiotics changed to IV Zosyn  ID consult appreciated  Podiatry planning repeat operative intervention

## 2025-05-21 NOTE — PROGRESS NOTES
Podiatry - Progress Note  Patient: Gold Van 79 y.o. male   MRN: [id number]  PCP: Shayne Diaz MD  Unit/Bed#: [address]-01 Encounter: [id number]  Date Of Visit: 25    ASSESSMENT:    Gold Van is a 79 y.o. male with:    Right 5th metatarsal ulceration with underlying OM ([place] [facility])  - s/p right fifth ray resection (DOS: 25)  Right foot cellulitis  PAD  - s/p right femoral endarterectomy and antegrade endovascular intervention (DOS: 25)  Type 2 diabetes mellitus  - A1c: 9.3% (25)      PLAN:    Patient to go to OR today,25, for Right partial 4th ray amputation and wound debridment with Dr. Phillips.  Consent reviewed and signed with patient at bedside, all questions and concerns addressed.   Discussed limb salvage at length with patient, and explained severity of infection in right foot. Patient understands and is amenable to planned procedure.   Appreciate cardiology recommendations, follow up repeat Troponin. OR pending final clearance.   Confirmed NPO status.  H&P, vitals, and current labs reviewed. No acute changes noted.  Alternatives, risks, and complications discussed with patient.  All questions answered.  No guarantees given to outcome of procedure.  Rest of medical care per primary team.       SUBJECTIVE:     The patient was seen, evaluated, and assessed at bedside today. The patient was awake, alert, and in no acute distress. Patient confirmed NPO status. All questions and concerns regarding the surgical procedure addressed. Patient understands risks vs benefits of procedure and remains amenable with plan for surgery today. Patient denies N/V/F/chills/SOB/CP.      OBJECTIVE:     Vitals:   /70 (BP Location: Right arm)   Pulse 88   Temp 98 °F (36.7 °C) (Oral)   Resp 18   Ht 6' (1.829 m)   Wt 83.5 kg (184 lb 1.4 oz)   SpO2 92%   BMI 24.97 kg/m²     Temp (24hrs), Av.2 °F (36.8 °C), Min:97.8 °F (36.6 °C), Max:98.5 °F (36.9 °C)      Physical Exam:  "    General:  Alert, cooperative, and in no distress.  Lower extremity exam:  Cardiovascular status at baseline.  Neurological status at baseline.  Musculoskeletal status at baseline. No calf tenderness noted bilaterally. Dressing left intact to the Operating Room.       Clinical Images 05/21/25:  none    Additional Data:     Labs:    Results from last 7 days   Lab Units 05/21/25  0458   WBC Thousand/uL 10.65*   HEMOGLOBIN g/dL 9.1*   HEMATOCRIT % 28.4*   PLATELETS Thousands/uL 532*   SEGS PCT % 74   LYMPHO PCT % 15   MONO PCT % 8   EOS PCT % 1     Results from last 7 days   Lab Units 05/21/25  0458 05/17/25  0542 05/16/25  0545   POTASSIUM mmol/L 3.9   < > 4.0   CHLORIDE mmol/L 98   < > 103   CO2 mmol/L 26   < > 25   BUN mg/dL 12   < > 14   CREATININE mg/dL 0.98   < > 0.87   CALCIUM mg/dL 8.2*   < > 8.1*   ALK PHOS U/L  --   --  51   ALT U/L  --   --  4*   AST U/L  --   --  12*    < > = values in this interval not displayed.           * I Have Reviewed All Lab Data Listed Above.    Recent Cultures (last 7 days):     Results from last 7 days   Lab Units 05/16/25  1337   GRAM STAIN RESULT  Rare Gram positive cocci in pairs*  No polys seen*   WOUND CULTURE  3+ Growth of Serratia marcescens*  3+ Growth of Enterococcus faecalis*  1+ Growth of Pseudomonas aeruginosa*     Results from last 7 days   Lab Units 05/16/25  1337   ANAEROBIC CULTURE  No anaerobes isolated       Imaging: I have personally reviewed pertinent films in PACS  EKG, Pathology, and Other Studies: I have personally reviewed pertinent reports.    ** Please Note: Portions of the record may have been created with voice recognition software. Occasional wrong word or \"sound a like\" substitutions may have occurred due to the inherent limitations of voice recognition software. Read the chart carefully and recognize, using context, where substitutions have occurred. **      "

## 2025-05-21 NOTE — ASSESSMENT & PLAN NOTE
- met SIRS criteria at [facility] with 05/07/25 with tachycardia and leukocytosis   - 5/16: Underwent right partial fifth ray resection: presumed surgical cure  - s/p right CFA SFA endarterectomy/stenting 5/13/2025   - currently on Plavix 75 mg daily (podiatry and vascular following)

## 2025-05-21 NOTE — ASSESSMENT & PLAN NOTE
Continue amlodipine 5 mg daily, atenolol 25 mg daily and lisinopril 20 mg twice daily  Blood pressure adequately controlled, continue to monitor with diuresis

## 2025-05-21 NOTE — ASSESSMENT & PLAN NOTE
Patient is a 79-year-old male with past medical history significant for DM2, PAF, asthma, and PAD who presented with right foot wound  Met sepsis criteria at [facility] initially  Diagnosed with right fifth metatarsal osteomyelitis with right foot cellulitis, started on abx and transferred here for vascular surgery  S/p vascular surgery and right fifth ray amputation 5/16  intraoperative cultures: Serratia/Enterococcus and pseudomonas  Blood culture negative x 2  Appreciate evaluation and recommendations from infectious disease team: Continue antibiotics with Zosyn  Anticipate return to the OR

## 2025-05-21 NOTE — ASSESSMENT & PLAN NOTE
Prior baseline hemoglobin appears to range 10-12  Postprocedure hemoglobin decreased to 7.0 when patient was symptomatic: Secondary to expected procedural blood loss  On 5/18 transfused 1 unit PRBC   Repeat hemoglobin 9's  Hemoglobin stable    Results from last 7 days   Lab Units 05/21/25  0458 05/20/25  0603 05/19/25  0548   HEMOGLOBIN g/dL 9.1* 9.6* 9.0*   MCV fL 92 92 91

## 2025-05-21 NOTE — PHYSICAL THERAPY NOTE
PHYSICAL THERAPY NOTE          Patient Name: Gold Van  Today's Date: 5/21/2025 05/21/25 1525   Note Type   Note Type Treatment   Pain Assessment   Pain Assessment Tool 0-10   Pain Score 5   Pain Location/Orientation Orientation: Right;Location: Foot   Pain Onset/Description Frequency: Intermittent   Hospital Pain Intervention(s) Ambulation/increased activity;Emotional support;Repositioned   Restrictions/Precautions   RLE Weight Bearing Per Order NWB   Other Precautions Chair Alarm;Bed Alarm;Fall Risk;Pain   General   Chart Reviewed Yes   Family/Caregiver Present No   Cognition   Overall Cognitive Status WFL   Arousal/Participation Alert;Responsive;Cooperative   Orientation Level Oriented X4   Following Commands Follows all commands and directions without difficulty   Subjective   Subjective I can't get out of bed myself.   Bed Mobility   Supine to Sit 4  Minimal assistance   Additional items Assist x 1;HOB elevated;Bedrails;Increased time required;Verbal cues   Sit to Supine 3  Moderate assistance   Additional items Assist x 2;Increased time required;Verbal cues;LE management   Additional Comments mod assist x1 to scoot to EOB   Transfers   Sit to Stand 2  Maximal assistance   Additional items Assist x 2;Increased time required;Verbal cues;Armrests;Assist x 3  (assist of third to maintain NWB to R le.)   Stand to Sit 3  Moderate assistance   Additional items Assist x 2;Verbal cues;Armrests;Increased time required;Assist x 3  (assist of 3rd to maintain NWB to R le.)   Stand pivot 2  Maximal assistance   Additional items Assist x 2;Assist x 3;Increased time required;Verbal cues  (assist of third to maintain NWB to R le.)   Additional Comments pt  unable to maintain NWB to R le  with transfers,  upon stnading  pt  able to maintain NWB for short periods of time,  requires assistance to maintain with hopping steps from bed to chair  and chair to bed   Balance   Static Sitting Fair +   Dynamic Sitting Fair   Static Standing Poor -   Dynamic Standing Poor -   Ambulatory Zero   Endurance Deficit   Endurance Deficit Description fatigue, generalized weakness and deconditioning.   Activity Tolerance   Activity Tolerance Patient limited by fatigue   Exercises   Quad Sets Supine;AROM;Bilateral;15 reps   Heelslides Supine;15 reps;AROM;Bilateral   Hip Flexion Supine;AROM;Bilateral;15 reps   Hip Abduction Supine;AROM;Bilateral;15 reps   Hip Adduction Supine;AROM;Bilateral;15 reps   Knee AROM Short Arc Quad Supine;AROM;Bilateral;15 reps   Knee AROM Long Arc Quad Sitting;10 reps;AROM;Right   Assessment   Prognosis Fair   Problem List Decreased strength;Decreased endurance;Impaired balance;Decreased mobility;Decreased skin integrity;Pain  (WBS)   Assessment Pt seen for PT treatment session this date with interventions consisting of bed mobility, transfer training, and HEP, and education provided as needed for safety and direction to improve functional mobility, safety awareness, and activity tolerance. Pt agreeable to PT treatment session upon arrival, pt found supine in bed . At end of session, pt left  supine in bed with all needs in reach. In comparison to previous session, pt with improvement in activity tolerance, endurance, standing balance, and functional mobility. Pt  is showing progress toward PT goals with improvements as noted.  Pt is currently functioning at min assist x1 for supine to sit, max assist x2 for sit to stand, mod assist x2 for stand to sit and max assist x2 for SPT/ hopping transfers from bed<> chair with assist of 3rd person to maintain NWB with all transitions.  Pt  is unable to maintain NWB to R le with sit <> stand and SPT/ hopping transfers,  pt  is able to maintain NWB with static standing and verbal cues.   Pt  was able to progress and take 3 hopping steps during SPT transfers with verbal cues for technique and direction.  Pt   requires assistance for steering and management of RW.   Pt is limited by fatigue, [place]  increased HR to 142 BPM with transfer/ mobility training.  Pt  performs seated and supine b/l le arom exercises  x 15 reps. Pt is requiring increased time, verbal cues and assistance for all aspects of mobility.  Trial SB transfers from bed to w/c and w/c mobility training next session.     Continue to recommend  level I maximal rehab resource intensity  at time of d/c in order to maximize pt's functional independence and safety w/ mobility. Pt continues to be functioning below baseline level. PT will continue to see pt while here in order to address the deficits listed above and provide interventions consistent w/ POC in effort to achieve STGs.    The patient's [facility] Basic Mobility Inpatient Short Form Raw Score is 10. A raw score less than 16 suggests the patient may benefit from discharge to post-acute rehabilitation services. Please also refer to the recommendation of the Physical Therapist for safe discharge planning.   Goals   Patient Goals TO be able to get and out of bed by myself.   STG Expiration Date 05/29/25   PT Treatment Day 3   Plan   Treatment/Interventions Functional transfer training;LE strengthening/ROM;Therapeutic exercise;Endurance training;Patient/family training;Equipment eval/education;Bed mobility;Spoke to nursing   Progress Slow progress, decreased activity tolerance   PT Frequency 3-5x/wk   Discharge Recommendation   Rehab Resource Intensity Level, PT I (Maximum Resource Intensity)   AM-PAC Basic Mobility Inpatient   Turning in Flat Bed Without Bedrails 3   Lying on Back to Sitting on Edge of Flat Bed Without Bedrails 3   Moving Bed to Chair 1   Standing Up From Chair Using Arms 1   Walk in Room 1   Climb 3-5 Stairs With Railing 1   Basic Mobility Inpatient Raw Score 10   Turning Head Towards Sound 4   Follow Simple Instructions 4   Low Function Basic Mobility Raw Score  18   Low Function Basic  Mobility Standardized Score  29.25   [facility] Highest Level Of Mobility   -[facility] Goal 4: Move to chair/commode   -[facility] Achieved 4: Move to chair/commode   Education   Education Provided Mobility training;Home exercise program;Assistive device   Patient Reinforcement needed;Demonstrates verbal understanding   End of Consult   Patient Position at End of Consult Supine;Bed/Chair alarm activated;All needs within reach      05/21/25 1525   Note Type   Note Type Treatment   Pain Assessment   Pain Assessment Tool 0-10   Pain Score 5   Pain Location/Orientation Orientation: Right;Location: Foot   Pain Onset/Description Frequency: Intermittent   Hospital Pain Intervention(s) Ambulation/increased activity;Emotional support;Repositioned   Restrictions/Precautions   RLE Weight Bearing Per Order NWB   Other Precautions Chair Alarm;Bed Alarm;Fall Risk;Pain   General   Chart Reviewed Yes   Family/Caregiver Present No   Cognition   Overall Cognitive Status WFL   Arousal/Participation Alert;Responsive;Cooperative   Orientation Level Oriented X4   Following Commands Follows all commands and directions without difficulty   Subjective   Subjective I can't get out of bed myself.   Bed Mobility   Supine to Sit 4  Minimal assistance   Additional items Assist x 1;HOB elevated;Bedrails;Increased time required;Verbal cues   Sit to Supine 3  Moderate assistance   Additional items Assist x 2;Increased time required;Verbal cues;LE management   Additional Comments mod assist x1 to scoot to EOB   Transfers   Sit to Stand 2  Maximal assistance   Additional items Assist x 2;Increased time required;Verbal cues;Armrests;Assist x 3  (assist of third to maintain NWB to R le.)   Stand to Sit 3  Moderate assistance   Additional items Assist x 2;Verbal cues;Armrests;Increased time required;Assist x 3  (assist of 3rd to maintain NWB to R le.)   Stand pivot 2  Maximal assistance   Additional items Assist x 2;Assist x 3;Increased time required;Verbal  cues  (assist of third to maintain NWB to R le.)   Additional Comments pt  unable to maintain NWB to R le  with transfers,  upon stnading  pt  able to maintain NWB for short periods of time,  requires assistance to maintain with hopping steps from bed to chair and chair to bed   Balance   Static Sitting Fair +   Dynamic Sitting Fair   Static Standing Poor -   Dynamic Standing Poor -   Ambulatory Zero   Endurance Deficit   Endurance Deficit Description fatigue, generalized weakness and deconditioning.   Activity Tolerance   Activity Tolerance Patient limited by fatigue   Exercises   Quad Sets Supine;AROM;Bilateral;15 reps   Heelslides Supine;15 reps;AROM;Bilateral   Hip Flexion Supine;AROM;Bilateral;15 reps   Hip Abduction Supine;AROM;Bilateral;15 reps   Hip Adduction Supine;AROM;Bilateral;15 reps   Knee AROM Short Arc Quad Supine;AROM;Bilateral;15 reps   Knee AROM Long Arc Quad Sitting;10 reps;AROM;Right   Assessment   Prognosis Fair   Problem List Decreased strength;Decreased endurance;Impaired balance;Decreased mobility;Decreased skin integrity;Pain  (WBS)   Assessment Pt seen for PT treatment session this date with interventions consisting of bed mobility, transfer training, and HEP, and education provided as needed for safety and direction to improve functional mobility, safety awareness, and activity tolerance. Pt agreeable to PT treatment session upon arrival, pt found supine in bed . At end of session, pt left  supine in bed with all needs in reach. In comparison to previous session, pt with improvement in activity tolerance, endurance, standing balance, and functional mobility. Pt  is showing progress toward PT goals with improvements as noted.  Pt is currently functioning at min assist x1 for supine to sit, max assist x2 for sit to stand, mod assist x2 for stand to sit and max assist x2 for SPT/ hopping transfers from bed<> chair with assist of 3rd person to maintain NWB with all transitions.  Pt  is unable  to maintain NWB to R le with sit <> stand and SPT/ hopping transfers,  pt  is able to maintain NWB with static standing and verbal cues.   Pt  was able to progress and take 3 hopping steps during SPT transfers with verbal cues for technique and direction.  Pt  requires assistance for steering and management of RW.   Pt is limited by fatigue, [place]  increased HR to 142 BPM with transfer/ mobility training.  Pt  performs seated and supine b/l le arom exercises  x 15 reps. Pt is requiring increased time, verbal cues and assistance for all aspects of mobility.  Trial SB transfers from bed to w/c and w/c mobility training next session.     Continue to recommend  level I maximal rehab resource intensity  at time of d/c in order to maximize pt's functional independence and safety w/ mobility. Pt continues to be functioning below baseline level. PT will continue to see pt while here in order to address the deficits listed above and provide interventions consistent w/ POC in effort to achieve STGs.    The patient's [facility] Basic Mobility Inpatient Short Form Raw Score is 10. A raw score less than 16 suggests the patient may benefit from discharge to post-acute rehabilitation services. Please also refer to the recommendation of the Physical Therapist for safe discharge planning.   Goals   Patient Goals TO be able to get and out of bed by myself.   STG Expiration Date 05/29/25   PT Treatment Day 3   Plan   Treatment/Interventions Functional transfer training;LE strengthening/ROM;Therapeutic exercise;Endurance training;Patient/family training;Equipment eval/education;Bed mobility;Spoke to nursing   Progress Slow progress, decreased activity tolerance   PT Frequency 3-5x/wk   Discharge Recommendation   Rehab Resource Intensity Level, PT I (Maximum Resource Intensity)   AM-PAC Basic Mobility Inpatient   Turning in Flat Bed Without Bedrails 3   Lying on Back to Sitting on Edge of Flat Bed Without Bedrails 3   Moving Bed to Chair 1    Standing Up From Chair Using Arms 1   Walk in Room 1   Climb 3-5 Stairs With Railing 1   Basic Mobility Inpatient Raw Score 10   Turning Head Towards Sound 4   Follow Simple Instructions 4   Low Function Basic Mobility Raw Score  18   Low Function Basic Mobility Standardized Score  29.25   [facility] Highest Level Of Mobility   -[facility] Goal 4: Move to chair/commode   -[facility] Achieved 4: Move to chair/commode   Education   Education Provided Mobility training;Home exercise program;Assistive device   Patient Reinforcement needed;Demonstrates verbal understanding   End of Consult   Patient Position at End of Consult Supine;Bed/Chair alarm activated;All needs within reach   Chelsea Torres, PTA

## 2025-05-21 NOTE — CASE MANAGEMENT
Case Management Discharge Planning Note    Patient name Gold Van  Location [place] [address] /[facility] 2 M* MRN [id number]  : 1945 Date 2025       Current Admission Date: 2025  Current Admission Diagnosis:Sepsis without acute organ dysfunction ([facility])   Patient Active Problem List    Diagnosis Date Noted    Osteomyelitis ([facility]) 2025    Troponin I above reference range 2025    Volume overload 2025    Postoperative anemia 2025    Pulmonary hypertension ([facility]) 2025    Sepsis without acute organ dysfunction ([facility]) 2025    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 ([facility]) 2025    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 ([facility]) 2025    PAF (paroxysmal atrial fibrillation) ([facility]) 2025    Chronic heart failure with preserved ejection fraction (HFpEF) ([facility]) 2025    Ulcer of right foot with fat layer exposed secondary to osteomyelitis 2025    Severe peripheral arterial disease ([facility]) 2024    Moderate protein-calorie malnutrition ([facility]) 10/09/2024    Gastroesophageal reflux disease without esophagitis 10/04/2024    Impaired mobility and activities of daily living 10/04/2024    Neuropathy 10/04/2024    Foot drop, left 10/04/2024    Abnormal echocardiogram 10/03/2024    CVA (cerebrovascular accident) (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) 2024    Non-recurrent bilateral inguinal hernia without obstruction or gangrene 2024    Pruritus 2024    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 2021    Hyponatremia 2021    Lung nodule 2021    Type 2 diabetes mellitus with complication, without long-term current use of insulin  ([facility]) 10/23/2017    Essential hypertension 10/23/2017    Mild intermittent asthma without complication 10/23/2017    Mixed hyperlipidemia 10/23/2017      LOS (days): 10  Geometric Mean LOS (GMLOS) (days): 9.6  Days to GMLOS:-0.2     OBJECTIVE:  Risk of Unplanned Readmission Score: 33.47         Current admission status: Inpatient   Preferred Pharmacy:   [facility] - [place], [place] - [address]  [address] [facility] [address]  Phone: [phone] Fax: [phone]    [facility] - [place], [place] - [address]  [address] [facility] [address]  Phone: [phone] Fax: [phone]    [facility] ([facility]) #[id number] [place], [place] - [address] [address]  Phone: [phone] Fax: [phone]    Primary Care Provider: Shayne Diaz MD    Primary Insurance: [facility] REP  Secondary Insurance:     DISCHARGE DETAILS:    Discharge planning discussed with:: Pt's wife Mya  Freedom of Choice: Yes  Comments - Freedom of Choice: Call received requesting additional referral to [facility]- made as requested                Contacts  Patient Contacts: Mya Van  Relationship to Patient:: Family  Contact Method: Phone  Phone Number: [phone]  Reason/Outcome: Referral                        Treatment Team Recommendation: Short Term Rehab  Discharge Destination Plan:: Short Term Rehab, SNF (Location [facility])

## 2025-05-21 NOTE — ASSESSMENT & PLAN NOTE
78 yo male ex-smoker w/ a hx of DM II (A1c 9.3), HTN, HLD, asthma, CVA (9/2024), PAF (eliquis), cardiomyopathy and PAD presented to [facility] on 5/7/25 w/ R foot pain x1 week and non-healing R 5th met wound x4-5 weeks. Pt admitted and started on ABX. Pt was transferred to [facility] on 5/11/25 for vascular surgical intervention.     Vascular surgery consulted for worsening R 5th met wound in the setting of PAD. CTA abd shows L CFA/SFA stenosis w/ focal occlusions and AT/peroneal occlusions w/ reconstitution. LEAD shows R PTA occlusion and R RIC: 0.42/15/16.    -Pt is S/P R CFA/SFA endarterectomy w/ bovine pericardial patch, DCB and stenting of SFA, and angio of AT on 5/13.   -Pt is S/P R partial 5th ray resection by podiatry on 5/16.    Diagnostics:  -5/6/25 CTA abd w/ runoff: B/L EDY/EIA/IIA patent w/ atherosclerosis. Right: Focal high-grade stenosis of distal R CFA and diffuse atherosclerotic disease of R SFA resulting in moderate to severe stenoses with focal near complete occlusions at the proximal and distal segments. Short segment occlusions of proximal R AT and peroneal arteries with reconstitution. R PTA is occluded. Left: Occluded L SFA with reconstitution. L YUNG and PTA occluded. One-vessel runoff LLE through peroneal artery.  -4/18/25 LEAD: Right: >75% prox SFA and 50-75% dSFA stenosis. Distal PTA occlusion. R RIC: 0.42/15/16. Left: PTA and YUNG occlusions. L RIC: 1.06/85/45.   -4/17/25 MRI R foot: Possible early OM in R 5th met    Plan:  -R 5th met wound (+) OM in the setting of PAD  -S/P R CFA/SFA endarterectomy w/ bovine pericardial patch, DCB and stenting of SFA, and angio of AT on 5/13 (POD #8)  -Continue daily incision care to R groin by nursing   -Continue plavix (new SFA stents) and lipitor for medical management of PAD   -Continue eliquis for hx of afib  -Podiatry following, S/P R partial 5th ray resection on 5/16  -Podiatry plan for R partial 4th ray amputation and wound debridment in OR today  (5/21)  -Cardiology following for hx of CHF w/ volume overload in the setting of multiple surgeries; input appreciated   -ID following for ABX management; input appreciated  -Continue medical management per primary team  -Case management following w/ plans for STR  -Pt is stable for discharge from a vascular surgery standpoint  -Will continue to follow peripherally while pt remains hospitalized  -Plan follow up outpt in vascular surgery office; appt scheduled for 6/5/25  -Will discuss w/ Dr. Andrade

## 2025-05-21 NOTE — ASSESSMENT & PLAN NOTE
Patient developed acute onset of dyspnea evening of 5/18 after blood transfusion  EKG with transient lateral ST depression  Troponins elevated 163-> 155-> 193-> 237  1/25 nuclear stress test: Ejection fraction 65%.  No evidence of ischemia  Appreciate Cardiology eval: elevated trop due to volume overload  On review of CT scan patient did have quite significant coronary calcifications  Continue medical therapy with Plavix, statin, and beta-blocker  Telemetry without significant arrhythmias: Continue to monitor closely perioperatively

## 2025-05-21 NOTE — PLAN OF CARE
Problem: PAIN - ADULT  Goal: Verbalizes/displays adequate comfort level or baseline comfort level  Description: Interventions:- Encourage patient to monitor pain and request assistance- Assess pain using appropriate pain scale- Administer analgesics based on type and severity of pain and evaluate response- Implement non-pharmacological measures as appropriate and evaluate response- Consider cultural and social influences on pain and pain management- Notify physician/advanced practitioner if interventions unsuccessful or patient reports new pain  5/21/2025 0747 by Taylor Vizcaino RN  Outcome: Progressing  5/21/2025 0747 by Taylor Vizcaino RN  Outcome: Progressing     Problem: DISCHARGE PLANNING  Goal: Discharge to home or other facility with appropriate resources  Description: INTERVENTIONS:- Identify barriers to discharge w/patient and caregiver- Arrange for needed discharge resources and transportation as appropriate- Identify discharge learning needs (meds, wound care, etc.)- Arrange for interpretive services to assist at discharge as needed- Refer to Case Management Department for coordinating discharge planning if the patient needs post-hospital services based on physician/advanced practitioner order or complex needs related to functional status, cognitive ability, or social support system  5/21/2025 0747 by Taylor Vizcaino RN  Outcome: Progressing  5/21/2025 0747 by Taylor Vizcaino RN  Outcome: Progressing     Problem: INFECTION - ADULT  Goal: Absence or prevention of progression during hospitalization  Description: INTERVENTIONS:  - Assess and monitor for signs and symptoms of infection  - Monitor lab/diagnostic results  - Monitor all insertion sites, i.e. indwelling lines, tubes, and drains  - Monitor endotracheal if appropriate and nasal secretions for changes in amount and color  - [place] appropriate cooling/warming therapies per order  - Administer medications as ordered  - Instruct and  encourage patient and family to use good hand hygiene technique  - Identify and instruct in appropriate isolation precautions for identified infection/condition  5/21/2025 0747 by Taylor Vizacino RN  Outcome: Progressing  5/21/2025 0747 by Taylor Vizcaino RN  Outcome: Progressing  Goal: Absence of fever/infection during neutropenic period  Description: INTERVENTIONS:  - Monitor WBC    5/21/2025 0747 by Taylor Vizcaino RN  Outcome: Progressing  5/21/2025 0747 by Taylor Vizcaino RN  Outcome: Progressing     Problem: SAFETY ADULT  Goal: Patient will remain free of falls  Description: INTERVENTIONS:  - Educate patient/family on patient safety including physical limitations  - Instruct patient to call for assistance with activity   - Consult OT/PT to assist with strengthening/mobility   - Keep Call bell within reach  - Keep bed low and locked with side rails adjusted as appropriate  - Keep care items and personal belongings within reach  - Initiate and maintain comfort rounds  - Make Fall Risk Sign visible to staff  - Offer Toileting every 2 Hours, in advance of need  - Initiate/Maintain bed alarm  - Obtain necessary fall risk management equipment:   - Apply yellow socks and bracelet for high fall risk patients  - Consider moving patient to room near nurses station  Outcome: Progressing  Goal: Maintain or return to baseline ADL function  Description: INTERVENTIONS:  -  Assess patient's ability to carry out ADLs; assess patient's baseline for ADL function and identify physical deficits which impact ability to perform ADLs (bathing, care of mouth/teeth, toileting, grooming, dressing, etc.)  - Assess/evaluate cause of self-care deficits   - Assess range of motion  - Assess patient's mobility; develop plan if impaired  - Assess patient's need for assistive devices and provide as appropriate  - Encourage maximum independence but intervene and supervise when necessary  - Involve family in performance of ADLs  - Assess  for home care needs following discharge   - Consider OT consult to assist with ADL evaluation and planning for discharge  - Provide patient education as appropriate  Outcome: Progressing  Goal: Maintains/Returns to pre admission functional level  Description: INTERVENTIONS:  - Perform AM-PAC 6 Click Basic Mobility/ Daily Activity assessment daily.  - Set and communicate daily mobility goal to care team and patient/family/caregiver.   - Collaborate with rehabilitation services on mobility goals if consulted  - Perform Range of Motion 4 times a day.  - Reposition patient every 3 hours.  - Dangle patient 3 times a day  - Stand patient 3 times a day  - Ambulate patient 3 times a day  - Out of bed to chair 3 times a day   - Out of bed for meals 3 times a day  - Out of bed for toileting  - Record patient progress and toleration of activity level   Outcome: Progressing

## 2025-05-21 NOTE — PROGRESS NOTES
Progress Note - Infectious Disease   Name: Gold Van 79 y.o. male I MRN: [id number]  Unit/Bed#: [place] [address] -01 I Date of Admission: 5/11/2025   Date of Service: 5/21/2025 I Hospital Day: 10     Assessment & Plan  Sepsis without acute organ dysfunction (HCC)  Admit on initial presentation to [facility] with tachycardia and leukocytosis, secondary to diabetic right foot infection.  Blood cultures negative.  As below patient is with ongoing surgical site infection.  He is afebrile and hemodynamically stable currently but with leukocytosis.  -Antibiotic plan as below  -Repeat CBC tomorrow to trend WBC  -Monitor fever curve and hemodynamics  -Ongoing podiatry recommendations appreciated  -Supportive care per primary team  Ulcer of right foot with fat layer exposed secondary to osteomyelitis  Known chronic diabetic foot ulcer, MRI in April suggestive of osteomyelitis of fifth metatarsal head.  Admitted with acute wound cellulitis status post right partial fifth ray resection with assumed surgical cure of osteomyelitis.  There was yellow viscous fluid within the metatarsal phalangeal joint consistent with infection.  On dressing change 5/19 patient had ongoing green-colored drainage concerning for ongoing soft tissue infection.  Patient will need further right foot washout pending cardiology clearance.  OR cx from 5/16 grew Serratia, Enterococcus faecalis and Pseudomonas.  - Continue IV Zosyn   - Patient to go to the OR today for partial fourth ray amputation and wound debridement  - Will need to discuss with podiatry postop if surgical cure obtained of osteomyelitis before determining ultimate duration of antibiotic  Osteomyelitis (HCC)  Of fifth metatarsal head in setting of diabetic foot ulcer.  Now status post partial fifth ray resection with presumed surgical cure on 5/13.  -Follow-up repeat podiatry amputation and washout today  -Will need to confirm if there is any ongoing concern for residual bone  infection postop  Severe peripheral arterial disease (HCC)  Status post vascular surgery intervention on 5/13 with right common femoral endarterectomy with bovine pericardial patch angioplasty, right SFA stenting, right angioplasty.  Type 2 diabetes mellitus with complication, without long-term current use of insulin ([facility])  Lab Results   Component Value Date    HGBA1C 9.3 (H) 04/09/2025   Significant risk factor for infection and poor wound healing.  -Tight glycemic control as per primary team  PAF (paroxysmal atrial fibrillation) ([facility])  On Eliquis at home.  Chronic heart failure with preserved ejection fraction (HFpEF) ([facility])  Cardiology following.    I have discussed the above management plan in detail with the primary service. They agree with plan to continue IV Zosyn, follow-up operative findings.    Antibiotics:  Zosyn: 2    Subjective   Patient remains without fever.  WBC stable.  No new symptoms.    Objective :  Temp:  [97.8 °F (36.6 °C)-98.5 °F (36.9 °C)] 98 °F (36.7 °C)  HR:  [77-91] 88  BP: (108-127)/(60-70) 108/70  Resp:  [18] 18  SpO2:  [88 %-92 %] 92 %  O2 Device: None (Room air)    General:  No acute distress  Psychiatric:  Awake and alert  Pulmonary:  Normal respiratory excursion without accessory muscle use  Abdomen:  Soft, nontender  Extremities:  No edema.  Left foot with intact dressing  Skin:  No rashes        Lab Results: I have reviewed the following results:  Results from last 7 days   Lab Units 05/21/25  0458 05/20/25  0603 05/19/25  0548   WBC Thousand/uL 10.65* 13.20* 11.33*   HEMOGLOBIN g/dL 9.1* 9.6* 9.0*   PLATELETS Thousands/uL 532* 520* 399*     Results from last 7 days   Lab Units 05/21/25  0458 05/20/25  0603 05/19/25  0548 05/17/25  0542 05/16/25  0545   SODIUM mmol/L 133* 134* 134*   < > 134*   POTASSIUM mmol/L 3.9 3.8 4.1   < > 4.0   CHLORIDE mmol/L 98 99 99   < > 103   CO2 mmol/L 26 26 28   < > 25   BUN mg/dL 12 10 9   < > 14   CREATININE mg/dL 0.98 0.95 0.98   < > 0.87   EGFR  ml/min/1.73sq m 73 75 73   < > 82   CALCIUM mg/dL 8.2* 8.5 8.7   < > 8.1*   AST U/L  --   --   --   --  12*   ALT U/L  --   --   --   --  4*   ALK PHOS U/L  --   --   --   --  51   ALBUMIN g/dL  --   --   --   --  2.6*    < > = values in this interval not displayed.     Results from last 7 days   Lab Units 05/16/25  9381   GRAM STAIN RESULT  Rare Gram positive cocci in pairs*  No polys seen*   WOUND CULTURE  3+ Growth of Serratia marcescens*  3+ Growth of Enterococcus faecalis*  1+ Growth of Pseudomonas aeruginosa*

## 2025-05-21 NOTE — PROGRESS NOTES
Progress Note - Cardiology   Name: Gold Van 79 y.o. male I MRN: [id number]  Unit/Bed#: [place] [address] -01 I Date of Admission: 5/11/2025   Date of Service: 5/21/2025 I Hospital Day: 10      Date of initial consultation: 5/19/2025    Diagnoses:  Acute pulmonary edema and heart failure exacerbation, HFpEF  Osteomyelitis of the foot  Severe occlusive PAD  Subclinical coronary artery disease with extensive coronary artery calcification  Hypertension  Diabetes mellitus  Paroxysmal atrial fibrillation  Postoperative anemia.  Secondary to blood loss  Mild intermittent asthma anemia    Assessment & Plan  Chronic heart failure with preserved ejection fraction (HFpEF) ([facility])    TODAY'S (05/21/25) ASSESSMENT   HPI / Last 24 hours No acute events.   Subjective Patient reports improved shortness of breath.  Denies chest pain or palpitations.  Not ambulating   VITALS highlights Heart rate consistently in 80s; blood pressure 108-120s / 60s-70s amatory 92% on room air   EXAM highlights Lean build, conjunctival pallor, no JVD, no carotid bruit, regular rate rhythm, normal intensity heart sounds, decreased breath sounds at bases without any crackles, surgical dressing in right lower extremity, ganglion cyst in the right wrist region, bruising and subcutaneous bleed in right arm.   LABS highlights Sodium 133 potassium 3.9 BUN 12 creatinine 0.98 GFR 73 hemoglobin 9.9 hematocrit 28.4 platelet count 532  High-sensitivity troponins 163 -- 155 -- 193 -- 237.   on 5/19/2025   ECHO and other studies TTE 5/19/2025: EF 50%, low normal, decrease GLS -11%, grade 1 diastolic dysfunction, hypokinesis of mid anteroseptal and apical septal walls; normal RV size and function; mild LA enlargement and normal RA size, prominent eustachian valve or cor triatriatum, lipomatous hypertrophy of interatrial septum, mildly thickened AV with mild AI, thickened MV with mild MAC and MV calcification, mild MR, trace TR, no obvious pulmonary  hypertension, no pericardial effusion.    Cardiac SPECT 1/9/2025: Normal perfusion, EF 65%.    TTE 10/01/24: LVEF 50%, mild hypokinesia of the posterior wall, grade I DD, mild AR    Telemetry & ECG Currently not on telemetry.  ECG 5/19/2025 anterolateral ST changes suggestive of ischemia.   Current GDMT Apixaban 5 mg twice daily + atenolol 25 mg daily + atorvastatin 40 mg daily + amlodipine 5 mg twice daily + clopidogrel 75 mg daily + furosemide 40 mg IV twice daily + lisinopril 20 mg p.o. twice daily     Patient clinically stable and no longer in heart failure.  Does not appear to be in a volume overloaded state on exam.  Has had no anginal-like chest pain or other symptoms.  Plan is for him to be taken to the OR for possible excision of right lower extremity fourth toe along with debridement and wound closure, procedure with inherent intermediate risk.  Findings of blood work and ECG from 5/19/2025 noted and concerning for ischemia.  Current hemoglobin is around 9.0.    Requesting another high sensitive troponin this morning ensure that the numbers are coming down.  If you had send troponin is coming down the patient may proceed with the OR plan for today.  Patient does have elevated risk for major adverse cardiac events leading to any procedure involving anesthesia.  Request that hemoglobin should be consistently kept 9.0 or greater.  Avoid tachycardia and large fluid shifts during the procedure.  Request adequate postoperative pain management.  Request postoperative hemoglobin and hematocrit along with ECG and high-sensitivity troponin.  Transfuse if hemoglobin is less than 9 g/dL or if it is between 9 and 10.  and patient is showing signs of ischemia.  Will need additional furosemide 20 mg IV.  Patient is receiving blood transfusion.  Will continue current cardiac medications.   Can discontinue IV furosemide monitor for symptoms of heart failure.  Sepsis without acute organ dysfunction (HCC)  Ulcer of right  foot with fat layer exposed secondary to osteomyelitis  Severe peripheral arterial disease (HCC)  - met SIRS criteria at [facility] with 05/07/25 with tachycardia and leukocytosis   - 5/16: Underwent right partial fifth ray resection: presumed surgical cure  - s/p right CFA SFA endarterectomy/stenting 5/13/2025   - currently on Plavix 75 mg daily (podiatry and vascular following)  Essential hypertension  - BP overall stable during admission   - home rx: amlodipine 5 mg daily, lisinopril 20 mg daily, atenolol 25 mg daily   PAF (paroxysmal atrial fibrillation) ([facility])  - Zio 01/06/25: predominant NSR with 1 NSVT lasting 5 beats, 2% AF/flutter burden  - rate control: atenolol 25 mg daily   - AC: Eliquis 5 mg BID  Troponin I above reference range  - troponin trend: 163 > 155 > 193 > 237  - EKG shows nonspecific ST and T wave abnormalities   Type 2 diabetes mellitus with complication, without long-term current use of insulin ([facility])  Lab Results   Component Value Date    HGBA1C 9.3 (H) 04/09/2025   Continue  Postoperative anemia  - baseline ~ 10-12 with drop to 7 after surgery   - s/p 1 unit of PRBCs 05/18/25   Mild intermittent asthma without complication      Subjective   Chief Complaint: Noted above in HPI    Objective :  Temp:  [97.8 °F (36.6 °C)-98.5 °F (36.9 °C)] 98 °F (36.7 °C)  HR:  [77-91] 88  BP: (108-127)/(60-70) 108/70  Resp:  [18] 18  SpO2:  [88 %-92 %] 92 %  O2 Device: None (Room air)  Orthostatic Blood Pressures      Flowsheet Row Most Recent Value   Blood Pressure 108/70 filed at 05/21/2025 0735   Patient Position - Orthostatic VS Lying filed at 05/21/2025 0735          First Weight: Weight - Scale: 72.4 kg (159 lb 11.2 oz) (05/12/25 0553)  Vitals:    05/20/25 0600 05/21/25 0600   Weight: 84.5 kg (186 lb 4.6 oz) 83.5 kg (184 lb 1.4 oz)     Physical Exam  Constitutional:       Appearance: He is normal weight.   Neck:      Vascular: No carotid bruit.     Cardiovascular:      Rate and Rhythm: Normal rate  and regular rhythm.      Heart sounds: No murmur heard.  Pulmonary:      Effort: Pulmonary effort is normal. No respiratory distress.      Breath sounds: Normal breath sounds. No wheezing or rales.   Abdominal:      Palpations: Abdomen is soft.     Musculoskeletal:      Cervical back: Neck supple.      Right lower leg: No edema.      Left lower leg: No edema.     Skin:     General: Skin is warm and dry.      Capillary Refill: Capillary refill takes less than 2 seconds.      Findings: Lesion present.      Comments: Surgical dressing in right lower extremity.  Bruising and ecchymosis in upper extremity.     Neurological:      Mental Status: He is alert and oriented to person, place, and time. Mental status is at baseline.     Psychiatric:         Mood and Affect: Mood normal.         Behavior: Behavior normal.         Lab Results: I have reviewed the following results:CBC/BMP:   .     05/21/25  0458   WBC 10.65*   HGB 9.1*   HCT 28.4*   *   SODIUM 133*   K 3.9   CL 98   CO2 26   BUN 12   CREATININE 0.98   GLUC 128   MG 1.9      Results from last 7 days   Lab Units 05/21/25  0458 05/20/25  0603 05/19/25  0548   WBC Thousand/uL 10.65* 13.20* 11.33*   HEMOGLOBIN g/dL 9.1* 9.6* 9.0*   HEMATOCRIT % 28.4* 30.2* 27.6*   PLATELETS Thousands/uL 532* 520* 399*     Results from last 7 days   Lab Units 05/21/25  0458 05/20/25  0603 05/19/25  0548   POTASSIUM mmol/L 3.9 3.8 4.1   CHLORIDE mmol/L 98 99 99   CO2 mmol/L 26 26 28   BUN mg/dL 12 10 9   CREATININE mg/dL 0.98 0.95 0.98   CALCIUM mg/dL 8.2* 8.5 8.7     Results from last 7 days   Lab Units 05/15/25  2314 05/15/25  1636 05/15/25  1019   PTT seconds 53* 47* 52*     Lab Results   Component Value Date    HGBA1C 9.3 (H) 04/09/2025     Lab Results   Component Value Date    CKTOTAL 292 09/26/2024    TROPONINI <0.03 03/24/2021       Imaging Results Review: I reviewed radiology reports from this admission including: chest xray and Echocardiogram.  Other Study Results Review:  EKG was reviewed.     VTE Pharmacologic Prophylaxis: VTE covered by:  apixaban, Oral, 5 mg at 05/20/25 4000        Pre-Excision Curettage Text (Leave Blank If You Do Not Want): Prior to drawing the surgical margin the visible lesion was removed with electrodesiccation and curettage to clearly define the lesion size.

## 2025-05-21 NOTE — ASSESSMENT & PLAN NOTE
Prior to admission on [facility].  No exacerbation  Albuterol metered-dose inhaler as needed

## 2025-05-21 NOTE — ANESTHESIA PREPROCEDURE EVALUATION
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   (+) 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 ([facility])   (+) Essential hypertension   (+) Mixed hyperlipidemia   (+) PAF (paroxysmal atrial fibrillation) ([facility])   (+) Pulmonary hypertension ([facility]) (Mild, PASP 37)   (+) Severe peripheral arterial disease ([facility]) (S/p right femoral endarterectomy yesterday)      ENDO   (+) Type 2 diabetes mellitus with complication, without long-term current use of insulin ([facility]) (A1C 9.3)      GI/HEPATIC   (+) Gastroesophageal reflux disease without esophagitis      HEMATOLOGY   (+) Postoperative anemia      NEURO/PSYCH   (+) CVA (cerebrovascular accident) ([facility])      PULMONARY   (+) Acute respiratory failure with hypoxia ([facility]) (SpO2 91% on RA)   (+) 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 Wall thickness is normal. The left ventricular ejection fraction is 50%. Systolic function is low normal. There is mild hypokinesia of the posterior wall. Diastolic function is mildly abnormal, consistent with grade I (abnormal) relaxation.    Aortic Valve: There is mild regurgitation.    The right ventricular systolic pressure is normal. The estimated right ventricular systolic pressure is 37.00 mmHg.       2024 PFTs:  Interpretation:   Moderate obstructive airflow defect on spirometry.   Positive post bronchodilator response.   Air trapping as indicated by the lung volumes.   Moderate decrease in diffusion capacity.   Flow-volume loop consistent with obstruction  Physical Exam    Airway     Mallampati score: II  TM Distance: >3 FB  Neck ROM:  full      Cardiovascular  Rhythm: regular, Rate: normal    Dental   Comment: Upper full and lower partial to be removed per-op     Pulmonary   Wheezes    Neurological    He appears awake, alert and oriented x3.      Other Findings        Anesthesia Plan  ASA Score- 3     Anesthesia Type- MAC with ASA Monitors.         Additional Monitors:     Airway Plan: natural airway.    Comment: Tachycardia overnight re eval in the AM    Patient with transient post-operative confusion  from femoral endarterectomy, resolved within a few hours.       Plan Factors-Exercise tolerance (METS): <4 METS.    Chart reviewed.   Existing labs reviewed.     Patient is not a current smoker.      Obstructive sleep apnea risk education given perioperatively.        Induction- intravenous.    Postoperative Plan- Plan for postoperative opioid use.   Monitoring Plan - Monitoring plan - standard ASA monitoring  Post Operative Pain Plan - plan for postoperative opioid use and field block    Perioperative Resuscitation Plan - Level 1 - Full Code.       Informed Consent- Anesthetic plan and risks discussed with patient.        NPO Status:  Vitals Value Taken Time   Date of last liquid 05/16/25 05/16/25 12:47   Time of last liquid 0830 05/16/25 12:47   Date of last solid 05/15/25 05/16/25 12:47   Time of last solid 2000 05/16/25 12:47

## 2025-05-21 NOTE — ASSESSMENT & PLAN NOTE
Known chronic diabetic foot ulcer, MRI in April suggestive of osteomyelitis of fifth metatarsal head.  Admitted with acute wound cellulitis status post right partial fifth ray resection with assumed surgical cure of osteomyelitis.  There was yellow viscous fluid within the metatarsal phalangeal joint consistent with infection.  On dressing change 5/19 patient had ongoing green-colored drainage concerning for ongoing soft tissue infection.  Patient will need further right foot washout pending cardiology clearance.  OR cx from 5/16 grew Serratia, Enterococcus faecalis and Pseudomonas.  - Continue IV Zosyn   - Patient to go to the OR today for partial fourth ray amputation and wound debridement  - Will need to discuss with podiatry postop if surgical cure obtained of osteomyelitis before determining ultimate duration of antibiotic

## 2025-05-21 NOTE — ASSESSMENT & PLAN NOTE
- met SIRS criteria at [facility] with 05/07/25 with tachycardia and leukocytosis   - 5/16: Underwent right partial fifth ray resection: presumed surgical cure  - s/p right CFA SFA endarterectomy/stenting 5/13/2025   - currently on Plavix 75 mg daily (podiatry and vascular following)

## 2025-05-21 NOTE — ASSESSMENT & PLAN NOTE
Lab Results   Component Value Date    HGBA1C 9.3 (H) 04/09/2025     Recent Labs     05/20/25  1130 05/20/25  1617 05/20/25  2141 05/21/25  0736   POCGLU 148* 209* 267* 119     Prior to admission meds: glipizide, linagliptin and metformin  A1c 9.3.  Endocrinology consulted by vascular surgery  Oral agents on hold preoperatively  Accu-Cheks and sliding scale insulin

## 2025-05-21 NOTE — QUICK NOTE
Called to see patient for tachycardia      Patient's heart rate noted to be 150, by nursing.  Home atenolol was held this morning due to blood pressure hold parameters    Subjective: Patient denies any complaints.  Notes he is aware of the sensation of palpitations.  Denies any chest pain or chest pressure.  Denies any chest tightness.  Denies any shortness of breath.  Denies any current pain.  Denies any nausea, vomiting, diarrhea    Physical exam:  General: Pleasant male.  No acute distress.  Nontachypneic and nondyspneic.  Able to speak in complete sentences without a stop or dyspnea  Heart: Irregularly irregular.  Tachycardic.  S1-S2 present  Lungs: Decreased breath sounds in both bases however otherwise clear to auscultation bilaterally.  Good air movement.  No wheezes, crackles, rhonchi.  No accessory muscle use or respiratory distress  Abdomen: Soft, nontender.  Nondistended.  Normoactive bowel sounds present  Neurologic: Awake and alert.  Oriented.  Interactive.  Cooperates with exam      EKG with sinus tachycardia, with lateral ST depressions  Toprol 5 mg IV x 1  Continue monitor on telemetry  Change hold parameters of metoprolol  Hold Norvasc, continue lisinopril

## 2025-05-21 NOTE — ASSESSMENT & PLAN NOTE
Follows with [facility] cardiology: Most recent note 4/25 was reviewed  Continue atenolol 25 mg daily.  Anticoagulation: Apixaban was on hold with heparin infusion.  Apixaban has been restarted for paroxysmal atrial fibrillation in the setting of previous stroke  Currently in normal sinus rhythm on telemetry: No arrhythmia

## 2025-05-21 NOTE — ASSESSMENT & PLAN NOTE
- met SIRS criteria at [facility] with 05/07/25 with tachycardia and leukocytosis   - 5/16: Underwent right partial fifth ray resection: presumed surgical cure  - s/p right CFA SFA endarterectomy/stenting 5/13/2025   - currently on Plavix 75 mg daily (podiatry and vascular following)

## 2025-05-21 NOTE — PROGRESS NOTES
Progress Note - Hospitalist   Name: Gold Van 79 y.o. male I MRN: [id number]  Unit/Bed#: [place] [address] -01 I Date of Admission: 5/11/2025   Date of Service: 5/21/2025 I Hospital Day: 10    Assessment & Plan  Sepsis without acute organ dysfunction (HCC)  Patient is a 79-year-old male with past medical history significant for DM2, PAF, asthma, and PAD who presented with right foot wound  Met sepsis criteria at [facility] initially  Diagnosed with right fifth metatarsal osteomyelitis with right foot cellulitis, started on abx and transferred here for vascular surgery  S/p vascular surgery and right fifth ray amputation 5/16  intraoperative cultures: Serratia/Enterococcus and pseudomonas  Blood culture negative x 2  Appreciate evaluation and recommendations from infectious disease team: Continue antibiotics with Zosyn  Anticipate return to the OR  Troponin I above reference range  Patient developed acute onset of dyspnea evening of 5/18 after blood transfusion  EKG with transient lateral ST depression  Troponins elevated 163-> 155-> 193-> 237  1/25 nuclear stress test: Ejection fraction 65%.  No evidence of ischemia  Appreciate Cardiology eval: elevated trop due to volume overload  On review of CT scan patient did have quite significant coronary calcifications  Continue medical therapy with Plavix, statin, and beta-blocker  Telemetry without significant arrhythmias: Continue to monitor closely perioperatively  Volume overload  Patient had episode of dyspnea after blood transfusion   Most likely volume overload secondary to blood transfusions, IV fluids/antibiotics, in the setting of chronic hpf EF  most recent echocardiogram 10/24: Left ventricular ejection fraction 50%.  Grade 1 diastolic dysfunction.  Mild hypokinesis of the posterior wall  Chest x-ray: Moderate interstitial edema  Patient was placed on diuresis with IV Lasix, however unfortunately initially he was refusing doses, since agreeable  Weight  decreased 1 kg from yesterday however still increased approximately 10 kg since initial admission  Urine output 1.8 L, net -1.3 L since yesterday  Continue IV diuresis  Severe peripheral arterial disease (HCC)  Transferred here for vascular surgery intervention  Underwent right CFA SFA endarterectomy/stenting 5/13/2025  Aspirin discontinued.  Started on clopidogrel for stents  Continue statin  Patient is also on Eliquis for paroxysmal atrial fibrillation  Discussed with vascular surgery team: No additional intervention required from their standpoint they will see in office follow-up  Osteomyelitis (HCC)  Right fifth metatarsal ulceration with underlying osteomyelitis and surrounding right foot cellulitis  Treated with cefazolin/Flagyl empirically  5/16: Underwent right partial fifth ray resection: presumed surgical cure  Wound care/VAC per podiatry: Removed 5/17 secondary to bleeding  Upon recheck patient's wound had significant drainage with surrounding cellulitis  Current cultures with Serratia/Enterococcus, and Pseudomonas  Antibiotics changed to IV Zosyn  ID consult appreciated  Podiatry planning repeat operative intervention  Postoperative anemia  Prior baseline hemoglobin appears to range 10-12  Postprocedure hemoglobin decreased to 7.0 when patient was symptomatic: Secondary to expected procedural blood loss  On 5/18 transfused 1 unit PRBC   Repeat hemoglobin 9's  Hemoglobin stable    Results from last 7 days   Lab Units 05/21/25  0458 05/20/25  0603 05/19/25  0548   HEMOGLOBIN g/dL 9.1* 9.6* 9.0*   MCV fL 92 92 91     Type 2 diabetes mellitus with complication, without long-term current use of insulin ([facility])  Lab Results   Component Value Date    HGBA1C 9.3 (H) 04/09/2025     Recent Labs     05/20/25  1130 05/20/25  1617 05/20/25  2141 05/21/25  0736   POCGLU 148* 209* 267* 119     Prior to admission meds: glipizide, linagliptin and metformin  A1c 9.3.  Endocrinology consulted by vascular surgery  Oral agents  "on hold preoperatively  Accu-Cheks and sliding scale insulin  PAF (paroxysmal atrial fibrillation) ([facility])  Follows with [facility] cardiology: Most recent note 4/25 was reviewed  Continue atenolol 25 mg daily.  Anticoagulation: Apixaban was on hold with heparin infusion.  Apixaban has been restarted for paroxysmal atrial fibrillation in the setting of previous stroke  Currently in normal sinus rhythm on telemetry: No arrhythmia  Essential hypertension  Continue amlodipine 5 mg daily, atenolol 25 mg daily and lisinopril 20 mg twice daily  Blood pressure adequately controlled, continue to monitor with diuresis  Mild intermittent asthma without complication  Prior to admission on [facility].  No exacerbation  Albuterol metered-dose inhaler as needed  Pruritus  Chronic pruritus follows with dermatology as an outpatient on prednisone 5 mg daily  Chronic heart failure with preserved ejection fraction (HFpEF) ([facility])  Last known LVEF 50% echocardiogram 2024  Patient being treated for volume overload secondary to blood transfusion/IV fluids as described above  Lung nodule  Chest x-ray with incidental finding of \"nodular opacity at left base\"  Outpatient chest x-ray in 6 weeks    VTE Pharmacologic Prophylaxis: VTE Score: 5 High Risk (Score >/= 5) - Pharmacological DVT Prophylaxis Ordered: rivaroxaban (Xarelto). Sequential Compression Devices Ordered.    Mobility:   Basic Mobility Inpatient Raw Score: 10  JH-HLM Goal: 4: Move to chair/commode  JH-HLM Achieved: 4: Move to chair/commode  JH-HLM Goal achieved. Continue to encourage appropriate mobility.    Patient Centered Rounds: I performed bedside rounds with nursing staff today.   Discussions with Specialists or Other Care Team Provider: d/w cardiology- [place]. Elmer MULTANI: cardiology team will eval pre-op.  D/w podiatry dr Hudson    Education and Discussions with Family / Patient: updated pt at bedside.  Will call wife with update after further dw cardiology/podiatry for timing of " surgery    Current Length of Stay: 10 day(s)  Current Patient Status: Inpatient   Certification Statement: The patient will continue to require additional inpatient hospital stay due to operative intervention, IV antibiotics  Discharge Plan: Anticipate discharge in >72 hrs to rehab facility.    Code Status: Level 1 - Full Code    Subjective   Patient denies any current complaints.  Notes he had pain in his foot earlier which is since resolved.  Denies any pain anywhere else.  Denies any chest pain.  Denies any shortness of breath at rest or cough.  Denies any abdominal pain.  Denies any nausea, vomiting, diarrhea or constipation.  Is tolerating p.o.  Denies any dizziness or lightheadedness.    Objective :  Temp:  [97.8 °F (36.6 °C)-98.5 °F (36.9 °C)] 98 °F (36.7 °C)  HR:  [77-91] 88  BP: (108-127)/(60-70) 108/70  Resp:  [18] 18  SpO2:  [88 %-92 %] 92 %  O2 Device: None (Room air)    Body mass index is 24.97 kg/m².     Input and Output Summary (last 24 hours):     Intake/Output Summary (Last 24 hours) at 5/21/2025 0904  Last data filed at 5/21/2025 0603  Gross per 24 hour   Intake 510 ml   Output 1875 ml   Net -1365 ml       Physical Exam  General: Very pleasant male.  No acute distress.  Nontachypneic and nondyspneic  Heart: Regular rate and rhythm.  S1-S2 present.  No murmur, rub, gallop  Lungs: Clear to auscultation bilaterally.  No wheezes, crackles, rhonchi.  No accessory muscle use or respiratory stress  Abdomen: Soft, nontender with palpation.  Nondistended.  Normal active bowel sounds present.  No guarding or rebound.  No peritoneal signs or mass  Extremities: Right foot dressing, and Ace wrap in place.  Neurologic: Awake and alert.  Oriented.  Interactive.  No somnolence or lethargy    Lines/Drains:        Telemetry:  Telemetry Orders (From admission, onward)               24 Hour Telemetry Monitoring  Continuous x 24 Hours (Telem)        Expiring   Question:  Reason for 24 Hour Telemetry  Answer:   Arrhythmias requiring acute medical intervention / PPM or ICD malfunction                     Telemetry Reviewed: Normal sinus rhythm  Indication for Continued Telemetry Use: Acute CHF on >200 mg lasix/day or equivalent dose or with new reduced EF.                Lab Results: I have reviewed the following results:   Results from last 7 days   Lab Units 25  0458   WBC Thousand/uL 10.65*   HEMOGLOBIN g/dL 9.1*   HEMATOCRIT % 28.4*   PLATELETS Thousands/uL 532*   SEGS PCT % 74   LYMPHO PCT % 15   MONO PCT % 8   EOS PCT % 1     Results from last 7 days   Lab Units 25  0458 25  0542 25  0545   SODIUM mmol/L 133*   < > 134*   POTASSIUM mmol/L 3.9   < > 4.0   CHLORIDE mmol/L 98   < > 103   CO2 mmol/L 26   < > 25   BUN mg/dL 12   < > 14   CREATININE mg/dL 0.98   < > 0.87   ANION GAP mmol/L 9   < > 6   CALCIUM mg/dL 8.2*   < > 8.1*   ALBUMIN g/dL  --   --  2.6*   TOTAL BILIRUBIN mg/dL  --   --  0.30   ALK PHOS U/L  --   --  51   ALT U/L  --   --  4*   AST U/L  --   --  12*   GLUCOSE RANDOM mg/dL 128   < > 167*    < > = values in this interval not displayed.         Results from last 7 days   Lab Units 25  0736 25  2141 25  1617 25  1130 25  0751 25  2126 25  1626 25  1058 25  0730 25  2109 25  1604 25  1126   POC GLUCOSE mg/dl 119 267* 209* 148* 87 144* 197* 210* 90 182* 227* 185*               Recent Cultures (last 7 days):   Results from last 7 days   Lab Units 25  1337   GRAM STAIN RESULT  Rare Gram positive cocci in pairs*  No polys seen*   WOUND CULTURE  3+ Growth of Serratia marcescens*  3+ Growth of Enterococcus faecalis*  1+ Growth of Pseudomonas aeruginosa*       ===========================================  Imagin/18 chest x-ray  moderate interstitial edema.  Nodular opacity at the left base, not visible on prior studies. Recommend follow-up with a chest radiograph with dual energy subtraction in 6 weeks  to assure resolution     5/16 right foot x-rayPostop right foot.  No acute osseous abnormality.     5/7 vein mapping  RIGHT LOWER LIMB:   The great saphenous vein is patent  from the groin to the ankle.   The intraluminal diameter measurements range from 2.2 mm to 12.6 mm throughout.   LEFT LOWER LIMB:   The great saphenous vein is patent  from the groin to the ankle.   The intraluminal diameter measurements range from 2.1 mm to 9 mm throughout.      5/7 right foot x-ray: Ulceration along the lateral aspect of the foot and soft tissue swelling along the dorsum of the foot without radiographic findings of osteomyelitis.      5/7 venous duplex  RIGHT LOWER LIMB   No evidence of acute or chronic deep vein thrombosis.   No evidence of superficial thrombophlebitis noted.   Doppler evaluation shows a normal response to augmentation maneuvers.   Popliteal, posterior tibial and anterior tibial arterial Doppler waveforms are monophasic (Known PAD).   Note: There is a well-defined hypoechoic non-vascularized cystic-type structure noted in the popliteal fossa.      LEFT LOWER LIMB LIMITED   Evaluation shows no evidence of thrombus in the common femoral vein.    Doppler evaluation shows a normal response to augmentation maneuvers.     5/6 CTA abdomen with runoff  1. Focal high-grade stenosis of distal right CFA and diffuse atherosclerotic disease of right SFA resulting in moderate to severe stenoses with focal near complete occlusions at the proximal and distal segments.  2. Short segment occlusions of proximal right anterior tibial and peroneal arteries with reconstitution in the medial to distal segments. Right posterior tibial artery is occluded.  3. Occluded left SFA with reconstitution in the distal segment. Left anterior tibial and posterior tibial arteries are occluded. One-vessel runoff of the left lower extremity through the peroneal artery.  4. Focal high-grade stenosis at the proximal right renal artery.         Microbiology  5/16 anaerobic culture: Negative  5/16 wound culture: 3+ Serratia.  3+ Enterococcus faecalis,pseudomonas.  5/7 blood culture: Negative x 2        =============================================    Last 24 Hours Medication List:     Current Facility-Administered Medications:     acetaminophen (TYLENOL) tablet 975 mg, Q8H SANDRA    albuterol (PROVENTIL HFA,VENTOLIN HFA) inhaler 1 puff, Q4H PRN    amLODIPine (NORVASC) tablet 5 mg, BID    apixaban (ELIQUIS) tablet 5 mg, BID    atenolol (TENORMIN) tablet 25 mg, Daily    atorvastatin (LIPITOR) tablet 40 mg, QPM    Budeson-Glycopyrrol-Formoterol 160-9-4.8 MCG/ACT AERO 2 puff, BID    clopidogrel (PLAVIX) tablet 75 mg, Daily    docusate sodium (COLACE) capsule 100 mg, BID    famotidine (PEPCID) tablet 20 mg, BID    furosemide (LASIX) injection 40 mg, BID (diuretic)    [Held by provider] glipiZIDE (GLUCOTROL XL) 24 hr tablet 10 mg, BID AC    insulin lispro (HumALOG/ADMELOG) 100 units/mL subcutaneous injection 1-5 Units, TID AC **AND** Fingerstick Glucose (POCT), TID AC    insulin lispro (HumALOG/ADMELOG) 100 units/mL subcutaneous injection 1-5 Units, HS    levalbuterol (XOPENEX) inhalation solution 1.25 mg, Q6H PRN    lidocaine (LIDODERM) 5 % patch 1 patch, Daily    lisinopril (ZESTRIL) tablet 20 mg, BID    [Held by provider] metFORMIN (GLUCOPHAGE-XR) 24 hr tablet 500 mg, Daily With Dinner    montelukast (SINGULAIR) tablet 10 mg, HS    morphine injection 2 mg, Q4H PRN    multivitamin-minerals (CENTRUM) tablet 1 tablet, Daily    ondansetron (ZOFRAN) injection 4 mg, Q6H PRN    oxyCODONE (ROXICODONE) IR tablet 5 mg, Q4H PRN **OR** oxyCODONE (ROXICODONE) immediate release tablet 10 mg, Q4H PRN    [COMPLETED] piperacillin-tazobactam (ZOSYN) 4.5 g in sodium chloride 0.9 % 100 mL IV LOADING DOSE, Once, Last Rate: Stopped (05/20/25 1245) **FOLLOWED BY** piperacillin-tazobactam (ZOSYN) 4.5 g in sodium chloride 0.9 % 100 mL IVPB (EXTENDED INFUSION), Q8H, Last Rate: 4.5 g  (05/21/25 0603)    polyethylene glycol (MIRALAX) packet 17 g, Daily PRN    predniSONE tablet 5 mg, Daily    senna (SENOKOT) tablet 8.6 mg, Daily    Administrative Statements   Today, Patient Was Seen By: Zulema Pitt MD      **Please Note: This note may have been constructed using a voice recognition system.**

## 2025-05-21 NOTE — ASSESSMENT & PLAN NOTE
TODAY'S (05/21/25) ASSESSMENT   HPI / Last 24 hours No acute events.   Subjective Patient reports improved shortness of breath.  Denies chest pain or palpitations.  Not ambulating   VITALS highlights Heart rate consistently in 80s; blood pressure 108-120s / 60s-70s amatory 92% on room air   EXAM highlights Lean build, conjunctival pallor, no JVD, no carotid bruit, regular rate rhythm, normal intensity heart sounds, decreased breath sounds at bases without any crackles, surgical dressing in right lower extremity, ganglion cyst in the right wrist region, bruising and subcutaneous bleed in right arm.   LABS highlights Sodium 133 potassium 3.9 BUN 12 creatinine 0.98 GFR 73 hemoglobin 9.9 hematocrit 28.4 platelet count 532  High-sensitivity troponins 163 -- 155 -- 193 -- 237.   on 5/19/2025   ECHO and other studies TTE 5/19/2025: EF 50%, low normal, decrease GLS -11%, grade 1 diastolic dysfunction, hypokinesis of mid anteroseptal and apical septal walls; normal RV size and function; mild LA enlargement and normal RA size, prominent eustachian valve or cor triatriatum, lipomatous hypertrophy of interatrial septum, mildly thickened AV with mild AI, thickened MV with mild MAC and MV calcification, mild MR, trace TR, no obvious pulmonary hypertension, no pericardial effusion.    Cardiac SPECT 1/9/2025: Normal perfusion, EF 65%.    TTE 10/01/24: LVEF 50%, mild hypokinesia of the posterior wall, grade I DD, mild AR    Telemetry & ECG Currently not on telemetry.  ECG 5/19/2025 anterolateral ST changes suggestive of ischemia.   Current GDMT Apixaban 5 mg twice daily + atenolol 25 mg daily + atorvastatin 40 mg daily + amlodipine 5 mg twice daily + clopidogrel 75 mg daily + furosemide 40 mg IV twice daily + lisinopril 20 mg p.o. twice daily     Patient clinically stable and no longer in heart failure.  Does not appear to be in a volume overloaded state on exam.  Has had no anginal-like chest pain or other symptoms.  Plan  is for him to be taken to the OR for possible excision of right lower extremity fourth toe along with debridement and wound closure, procedure with inherent intermediate risk.  Findings of blood work and ECG from 5/19/2025 noted and concerning for ischemia.  Current hemoglobin is around 9.0.    Requesting another high sensitive troponin this morning ensure that the numbers are coming down.  If you had send troponin is coming down the patient may proceed with the OR plan for today.  Patient does have elevated risk for major adverse cardiac events leading to any procedure involving anesthesia.  Request that hemoglobin should be consistently kept 9.0 or greater.  Avoid tachycardia and large fluid shifts during the procedure.  Request adequate postoperative pain management.  Request postoperative hemoglobin and hematocrit along with ECG and high-sensitivity troponin.  Transfuse if hemoglobin is less than 9 g/dL or if it is between 9 and 10.  and patient is showing signs of ischemia.  Will need additional furosemide 20 mg IV.  Patient is receiving blood transfusion.  Will continue current cardiac medications.   Can discontinue IV furosemide monitor for symptoms of heart failure.

## 2025-05-21 NOTE — ASSESSMENT & PLAN NOTE
80 yo male ex-smoker w/ a hx of DM II (A1c 9.3), HTN, HLD, asthma, CVA (9/2024), PAF (eliquis), cardiomyopathy and PAD presented to [facility] on 5/7/25 w/ R foot pain x1 week and non-healing R 5th met wound x4-5 weeks. Pt admitted and started on ABX. Pt was transferred to [facility] on 5/11/25 for vascular surgical intervention.     Vascular surgery consulted for worsening R 5th met wound in the setting of PAD. CTA abd shows L CFA/SFA stenosis w/ focal occlusions and AT/peroneal occlusions w/ reconstitution. LEAD shows R PTA occlusion and R RIC: 0.42/15/16.    -Pt is S/P R CFA/SFA endarterectomy w/ bovine pericardial patch, DCB and stenting of SFA, and angio of AT on 5/13.   -Pt is S/P R partial 5th ray resection by podiatry on 5/16.    Diagnostics:  -5/6/25 CTA abd w/ runoff: B/L EDY/EIA/IIA patent w/ atherosclerosis. Right: Focal high-grade stenosis of distal R CFA and diffuse atherosclerotic disease of R SFA resulting in moderate to severe stenoses with focal near complete occlusions at the proximal and distal segments. Short segment occlusions of proximal R AT and peroneal arteries with reconstitution. R PTA is occluded. Left: Occluded L SFA with reconstitution. L YUNG and PTA occluded. One-vessel runoff LLE through peroneal artery.  -4/18/25 LEAD: Right: >75% prox SFA and 50-75% dSFA stenosis. Distal PTA occlusion. R RIC: 0.42/15/16. Left: PTA and YUNG occlusions. L RIC: 1.06/85/45.   -4/17/25 MRI R foot: Possible early OM in R 5th met    Plan:  -R 5th met wound (+) OM in the setting of PAD  -S/P R CFA/SFA endarterectomy w/ bovine pericardial patch, DCB and stenting of SFA, and angio of AT on 5/13 (POD #9)  -Continue daily incision care to R groin by nursing   -Continue plavix (new SFA stents) and lipitor for medical management of PAD   -Continue eliquis for hx of afib  -Podiatry following, S/P R partial 5th ray resection on 5/16  -Podiatry plan for R partial 4th ray amputation and wound debridment in OR today  (5/22)  -Intra-op cx from 5/16 (+) for serratia, enterococcus faecalis and pseudomonas   -ID following for ABX management; input appreciated  -Cardiology following for CHF management; input appreciated   -Continue medical management per primary team  -Case management following w/ plans for STR  -Pt is stable for discharge from a vascular surgery standpoint  -Will continue to follow peripherally while pt remains hospitalized  -Plan follow up outpt in vascular surgery office; appt scheduled for 6/5/25  -Will discuss w/ Dr. Cee

## 2025-05-21 NOTE — PLAN OF CARE
Problem: PHYSICAL THERAPY ADULT  Goal: Performs mobility at highest level of function for planned discharge setting.  See evaluation for individualized goals.  Description: Treatment/Interventions: Functional transfer training, LE strengthening/ROM, Elevations, Therapeutic exercise, Endurance training, Patient/family training, Bed mobility, Gait training, Spoke to nursing, OT  Equipment Recommended: Walker (pt has)       See flowsheet documentation for full assessment, interventions and recommendations.  Outcome: Progressing  Note: Prognosis: Fair  Problem List: Decreased strength, Decreased endurance, Impaired balance, Decreased mobility, Decreased skin integrity, Pain (WBS)  Assessment: Pt seen for PT treatment session this date with interventions consisting of bed mobility, transfer training, and HEP, and education provided as needed for safety and direction to improve functional mobility, safety awareness, and activity tolerance. Pt agreeable to PT treatment session upon arrival, pt found supine in bed . At end of session, pt left  supine in bed with all needs in reach. In comparison to previous session, pt with improvement in activity tolerance, endurance, standing balance, and functional mobility. Pt  is showing progress toward PT goals with improvements as noted.  Pt is currently functioning at min assist x1 for supine to sit, max assist x2 for sit to stand, mod assist x2 for stand to sit and max assist x2 for SPT/ hopping transfers from bed<> chair with assist of 3rd person to maintain NWB with all transitions.  Pt  is unable to maintain NWB to R le with sit <> stand and SPT/ hopping transfers,  pt  is able to maintain NWB with static standing and verbal cues.   Pt  was able to progress and take 3 hopping steps during SPT transfers with verbal cues for technique and direction.  Pt  requires assistance for steering and management of RW.   Pt is limited by fatigue, [place]  increased HR to 142 BPM with transfer/  mobility training.  Pt  performs seated and supine b/l le arom exercises  x 15 reps. Pt is requiring increased time, verbal cues and assistance for all aspects of mobility.  Trial SB transfers from bed to w/c and w/c mobility training next session.     Continue to recommend  level I maximal rehab resource intensity  at time of d/c in order to maximize pt's functional independence and safety w/ mobility. Pt continues to be functioning below baseline level. PT will continue to see pt while here in order to address the deficits listed above and provide interventions consistent w/ POC in effort to achieve STGs.    The patient's AM-PAC Basic Mobility Inpatient Short Form Raw Score is 10. A raw score less than 16 suggests the patient may benefit from discharge to post-acute rehabilitation services. Please also refer to the recommendation of the Physical Therapist for safe discharge planning.  Barriers to Discharge: None  Barriers to Discharge Comments: at current LOF/wbs  Rehab Resource Intensity Level, PT: I (Maximum Resource Intensity)    See flowsheet documentation for full assessment.

## 2025-05-21 NOTE — ASSESSMENT & PLAN NOTE
Patient had episode of dyspnea after blood transfusion   Most likely volume overload secondary to blood transfusions, IV fluids/antibiotics, in the setting of chronic hpf EF  most recent echocardiogram 10/24: Left ventricular ejection fraction 50%.  Grade 1 diastolic dysfunction.  Mild hypokinesis of the posterior wall  Chest x-ray: Moderate interstitial edema  Patient was placed on diuresis with IV Lasix, however unfortunately initially he was refusing doses, since agreeable  Weight decreased 1 kg from yesterday however still increased approximately 10 kg since initial admission  Urine output 1.8 L, net -1.3 L since yesterday  Continue IV diuresis

## 2025-05-21 NOTE — PROGRESS NOTES
Progress Note - Vascular Surgery   Name: Gold Van 79 y.o. male I MRN: [id number]  Unit/Bed#: [place] [address] -01 I Date of Admission: 5/11/2025   Date of Service: 5/21/2025 I Hospital Day: 10    Assessment & Plan  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 (HCC)  78 yo male ex-smoker w/ a hx of DM II (A1c 9.3), HTN, HLD, asthma, CVA (9/2024), PAF (eliquis), cardiomyopathy and PAD presented to [facility] on 5/7/25 w/ R foot pain x1 week and non-healing R 5th met wound x4-5 weeks. Pt admitted and started on ABX. Pt was transferred to [facility] on 5/11/25 for vascular surgical intervention.     Vascular surgery consulted for worsening R 5th met wound in the setting of PAD. CTA abd shows L CFA/SFA stenosis w/ focal occlusions and AT/peroneal occlusions w/ reconstitution. LEAD shows R PTA occlusion and R RIC: 0.42/15/16.    -Pt is S/P R CFA/SFA endarterectomy w/ bovine pericardial patch, DCB and stenting of SFA, and angio of AT on 5/13.   -Pt is S/P R partial 5th ray resection by podiatry on 5/16.    Diagnostics:  -5/6/25 CTA abd w/ runoff: B/L EDY/EIA/IIA patent w/ atherosclerosis. Right: Focal high-grade stenosis of distal R CFA and diffuse atherosclerotic disease of R SFA resulting in moderate to severe stenoses with focal near complete occlusions at the proximal and distal segments. Short segment occlusions of proximal R AT and peroneal arteries with reconstitution. R PTA is occluded. Left: Occluded L SFA with reconstitution. L YUNG and PTA occluded. One-vessel runoff LLE through peroneal artery.  -4/18/25 LEAD: Right: >75% prox SFA and 50-75% dSFA stenosis. Distal PTA occlusion. R RIC: 0.42/15/16. Left: PTA and YUNG occlusions. L RIC: 1.06/85/45.   -4/17/25 MRI R foot: Possible early OM in R 5th met    Plan:  -R 5th met wound (+) OM in the setting of PAD  -S/P R CFA/SFA endarterectomy w/ bovine pericardial patch, DCB and stenting of SFA, and angio of AT on 5/13 (POD #8)  -Continue  daily incision care to R groin by nursing   -Continue plavix (new SFA stents) and lipitor for medical management of PAD   -Continue eliquis for hx of afib  -Podiatry following, S/P R partial 5th ray resection on 5/16  -Podiatry plan for R partial 4th ray amputation and wound debridment in OR today (5/21)  -Cardiology following for hx of CHF w/ volume overload in the setting of multiple surgeries; input appreciated   -ID following for ABX management; input appreciated  -Continue medical management per primary team  -Case management following w/ plans for STR  -Pt is stable for discharge from a vascular surgery standpoint  -Will continue to follow peripherally while pt remains hospitalized  -Plan follow up outpt in vascular surgery office; appt scheduled for 6/5/25  -Will discuss w/ Dr. Andrade    Subjective : Pt seen for exam while lying in bed; NAD. Pt reports R foot pain is significantly improved today and offers no further complaints at this time.    Objective :  Temp:  [97.8 °F (36.6 °C)-98.5 °F (36.9 °C)] 98.3 °F (36.8 °C)  HR:  [77-90] 90  BP: (108-136)/(60-70) 136/67  Resp:  [18] 18  SpO2:  [89 %-92 %] 92 %  O2 Device: None (Room air)     I/O         05/19 0701  05/20 0700 05/20 0701  05/21 0700 05/21 0701  05/22 0700    P.O. 660 510     Blood       Total Intake(mL/kg) 660 (7.8) 510 (6.1)     Urine (mL/kg/hr) 400 (0.2) 1875 (0.9)     Stool       Total Output 400 1875     Net +260 -1365                    Physical Exam  Vitals and nursing note reviewed.   Constitutional:       General: He is awake. He is not in acute distress.     Appearance: Normal appearance. He is well-developed.   HENT:      Head: Normocephalic and atraumatic.     Cardiovascular:      Rate and Rhythm: Normal rate and regular rhythm.      Pulses:           Dorsalis pedis pulses are detected w/ Doppler on the right side and detected w/ Doppler on the left side.        Posterior tibial pulses are detected w/ Doppler on the right side and  detected w/ Doppler on the left side.      Heart sounds: Normal heart sounds.      Comments: Trace R pedal and lower calf edema. Multiphasic R DP/PT signals.  Pulmonary:      Effort: Pulmonary effort is normal. No respiratory distress.   Abdominal:      General: There is no distension.      Palpations: Abdomen is soft.     Musculoskeletal:         General: No swelling.      Cervical back: Neck supple.      Right lower leg: Edema present.     Skin:     General: Skin is warm and dry.      Capillary Refill: Capillary refill takes less than 2 seconds.      Findings: Wound present.      Comments: R 5th met surgical site. R groin incision.     Neurological:      General: No focal deficit present.      Mental Status: He is alert and oriented to person, place, and time. Mental status is at baseline.     Psychiatric:         Mood and Affect: Mood normal.         Speech: Speech normal.         Behavior: Behavior normal. Behavior is cooperative.         Thought Content: Thought content normal.         Judgment: Judgment normal.       Wound/Incision:  R 5th metatarsal surgical wound w/ dressing clean, dry and intact. R groin incision site soft, well-approx, no swelling, ecchymosis, erythema or drainage, staples intact.       Lab Results: I have reviewed the following results:  CBC with diff:   Lab Results   Component Value Date    WBC 10.65 (H) 05/21/2025    HGB 9.1 (L) 05/21/2025    HCT 28.4 (L) 05/21/2025    MCV 92 05/21/2025     (H) 05/21/2025    RBC 3.08 (L) 05/21/2025    MCH 29.5 05/21/2025    MCHC 32.0 05/21/2025    RDW 16.0 (H) 05/21/2025    MPV 9.3 05/21/2025    NRBC 0 05/21/2025     BMP/CMP:  Lab Results   Component Value Date    SODIUM 133 (L) 05/21/2025    K 3.9 05/21/2025    K 4.5 04/14/2015    CL 98 05/21/2025     04/14/2015    CO2 26 05/21/2025    CO2 22 05/13/2025    ANIONGAP 8 04/14/2015    BUN 12 05/21/2025    BUN 10 04/14/2015    CREATININE 0.98 05/21/2025    CREATININE 0.86 04/14/2015    GLUCOSE  262 (H) 05/13/2025    GLUCOSE 179 (H) 04/14/2015    CALCIUM 8.2 (L) 05/21/2025    CALCIUM 8.9 04/14/2015    AST 12 (L) 05/16/2025    AST 12 08/12/2014    ALT 4 (L) 05/16/2025    ALT 19 08/12/2014    ALKPHOS 51 05/16/2025    ALKPHOS 101 08/12/2014    PROT 6.8 08/12/2014    BILITOT 0.6 08/12/2014    EGFR 73 05/21/2025     Coags:   Lab Results   Component Value Date    PTT 53 (H) 05/15/2025    INR 1.14 05/07/2025     Blood Culture:   Lab Results   Component Value Date    BLOODCX No Growth After 5 Days. 05/07/2025    BLOODCX No Growth After 5 Days. 05/07/2025     Wound Culure:   Lab Results   Component Value Date    WOUNDCULT 3+ Growth of Serratia marcescens (A) 05/16/2025    WOUNDCULT 3+ Growth of Enterococcus faecalis (A) 05/16/2025    WOUNDCULT 1+ Growth of Pseudomonas aeruginosa (A) 05/16/2025

## 2025-05-22 ENCOUNTER — ANESTHESIA (INPATIENT)
Dept: PERIOP | Facility: HOSPITAL | Age: 80
End: 2025-05-22
Payer: COMMERCIAL

## 2025-05-22 LAB
ANION GAP SERPL CALCULATED.3IONS-SCNC: 7 MMOL/L (ref 4–13)
ATRIAL RATE: 77 BPM
ATRIAL RATE: 82 BPM
BASOPHILS # BLD AUTO: 0.04 THOUSANDS/ÂΜL (ref 0–0.1)
BASOPHILS NFR BLD AUTO: 1 % (ref 0–1)
BUN SERPL-MCNC: 14 MG/DL (ref 5–25)
CALCIUM SERPL-MCNC: 8 MG/DL (ref 8.4–10.2)
CHLORIDE SERPL-SCNC: 102 MMOL/L (ref 96–108)
CO2 SERPL-SCNC: 26 MMOL/L (ref 21–32)
CREAT SERPL-MCNC: 0.89 MG/DL (ref 0.6–1.3)
EOSINOPHIL # BLD AUTO: 0.12 THOUSAND/ÂΜL (ref 0–0.61)
EOSINOPHIL NFR BLD AUTO: 2 % (ref 0–6)
ERYTHROCYTE [DISTWIDTH] IN BLOOD BY AUTOMATED COUNT: 16.2 % (ref 11.6–15.1)
GFR SERPL CREATININE-BSD FRML MDRD: 81 ML/MIN/1.73SQ M
GLUCOSE SERPL-MCNC: 198 MG/DL (ref 65–140)
GLUCOSE SERPL-MCNC: 217 MG/DL (ref 65–140)
GLUCOSE SERPL-MCNC: 225 MG/DL (ref 65–140)
GLUCOSE SERPL-MCNC: 254 MG/DL (ref 65–140)
GLUCOSE SERPL-MCNC: 360 MG/DL (ref 65–140)
HCT VFR BLD AUTO: 27.9 % (ref 36.5–49.3)
HGB BLD-MCNC: 9 G/DL (ref 12–17)
IMM GRANULOCYTES # BLD AUTO: 0.12 THOUSAND/UL (ref 0–0.2)
IMM GRANULOCYTES NFR BLD AUTO: 2 % (ref 0–2)
LYMPHOCYTES # BLD AUTO: 1.34 THOUSANDS/ÂΜL (ref 0.6–4.47)
LYMPHOCYTES NFR BLD AUTO: 18 % (ref 14–44)
MCH RBC QN AUTO: 29.8 PG (ref 26.8–34.3)
MCHC RBC AUTO-ENTMCNC: 32.3 G/DL (ref 31.4–37.4)
MCV RBC AUTO: 92 FL (ref 82–98)
MONOCYTES # BLD AUTO: 0.79 THOUSAND/ÂΜL (ref 0.17–1.22)
MONOCYTES NFR BLD AUTO: 10 % (ref 4–12)
NEUTROPHILS # BLD AUTO: 5.2 THOUSANDS/ÂΜL (ref 1.85–7.62)
NEUTS SEG NFR BLD AUTO: 67 % (ref 43–75)
NRBC BLD AUTO-RTO: 0 /100 WBCS
P AXIS: 61 DEGREES
P AXIS: 65 DEGREES
PLATELET # BLD AUTO: 568 THOUSANDS/UL (ref 149–390)
PMV BLD AUTO: 9 FL (ref 8.9–12.7)
POTASSIUM SERPL-SCNC: 3.9 MMOL/L (ref 3.5–5.3)
PR INTERVAL: 142 MS
PR INTERVAL: 144 MS
QRS AXIS: 58 DEGREES
QRS AXIS: 59 DEGREES
QRSD INTERVAL: 86 MS
QRSD INTERVAL: 90 MS
QT INTERVAL: 370 MS
QT INTERVAL: 394 MS
QTC INTERVAL: 433 MS
QTC INTERVAL: 446 MS
RBC # BLD AUTO: 3.02 MILLION/UL (ref 3.88–5.62)
SODIUM SERPL-SCNC: 135 MMOL/L (ref 135–147)
T WAVE AXIS: 256 DEGREES
T WAVE AXIS: 259 DEGREES
VENTRICULAR RATE: 77 BPM
VENTRICULAR RATE: 82 BPM
WBC # BLD AUTO: 7.61 THOUSAND/UL (ref 4.31–10.16)

## 2025-05-22 PROCEDURE — 99232 SBSQ HOSP IP/OBS MODERATE 35: CPT | Performed by: INTERNAL MEDICINE

## 2025-05-22 PROCEDURE — 99024 POSTOP FOLLOW-UP VISIT: CPT | Performed by: NURSE PRACTITIONER

## 2025-05-22 PROCEDURE — G0545 PR INHERENT VISIT TO INPT: HCPCS | Performed by: INTERNAL MEDICINE

## 2025-05-22 PROCEDURE — 82948 REAGENT STRIP/BLOOD GLUCOSE: CPT

## 2025-05-22 PROCEDURE — 85025 COMPLETE CBC W/AUTO DIFF WBC: CPT | Performed by: INTERNAL MEDICINE

## 2025-05-22 PROCEDURE — NC001 PR NO CHARGE: Performed by: PODIATRIST

## 2025-05-22 PROCEDURE — 80048 BASIC METABOLIC PNL TOTAL CA: CPT | Performed by: INTERNAL MEDICINE

## 2025-05-22 PROCEDURE — 88311 DECALCIFY TISSUE: CPT | Performed by: PATHOLOGY

## 2025-05-22 PROCEDURE — 99233 SBSQ HOSP IP/OBS HIGH 50: CPT | Performed by: INTERNAL MEDICINE

## 2025-05-22 PROCEDURE — 93010 ELECTROCARDIOGRAM REPORT: CPT | Performed by: INTERNAL MEDICINE

## 2025-05-22 PROCEDURE — 88305 TISSUE EXAM BY PATHOLOGIST: CPT | Performed by: PATHOLOGY

## 2025-05-22 PROCEDURE — 93005 ELECTROCARDIOGRAM TRACING: CPT

## 2025-05-22 RX ORDER — INSULIN LISPRO 100 [IU]/ML
8 INJECTION, SOLUTION INTRAVENOUS; SUBCUTANEOUS ONCE
Status: COMPLETED | OUTPATIENT
Start: 2025-05-22 | End: 2025-05-22

## 2025-05-22 RX ADMIN — FAMOTIDINE 20 MG: 20 TABLET, FILM COATED ORAL at 17:08

## 2025-05-22 RX ADMIN — INSULIN LISPRO 1 UNITS: 100 INJECTION, SOLUTION INTRAVENOUS; SUBCUTANEOUS at 11:50

## 2025-05-22 RX ADMIN — INSULIN LISPRO 8 UNITS: 100 INJECTION, SOLUTION INTRAVENOUS; SUBCUTANEOUS at 17:09

## 2025-05-22 RX ADMIN — PIPERACILLIN AND TAZOBACTAM 4.5 G: 36; 4.5 INJECTION, POWDER, LYOPHILIZED, FOR SOLUTION INTRAVENOUS at 14:55

## 2025-05-22 RX ADMIN — OXYCODONE HYDROCHLORIDE 10 MG: 10 TABLET ORAL at 11:08

## 2025-05-22 RX ADMIN — LISINOPRIL 20 MG: 20 TABLET ORAL at 21:11

## 2025-05-22 RX ADMIN — BUDESONIDE, GLYCOPYRROLATE, AND FORMOTEROL FUMARATE 2 PUFF: 160; 9; 4.8 AEROSOL, METERED RESPIRATORY (INHALATION) at 21:05

## 2025-05-22 RX ADMIN — PIPERACILLIN AND TAZOBACTAM 4.5 G: 36; 4.5 INJECTION, POWDER, LYOPHILIZED, FOR SOLUTION INTRAVENOUS at 23:32

## 2025-05-22 RX ADMIN — ATORVASTATIN CALCIUM 40 MG: 40 TABLET, FILM COATED ORAL at 17:08

## 2025-05-22 RX ADMIN — INSULIN LISPRO 2 UNITS: 100 INJECTION, SOLUTION INTRAVENOUS; SUBCUTANEOUS at 21:05

## 2025-05-22 RX ADMIN — APIXABAN 5 MG: 5 TABLET, FILM COATED ORAL at 09:19

## 2025-05-22 RX ADMIN — PREDNISONE 5 MG: 5 TABLET ORAL at 09:19

## 2025-05-22 RX ADMIN — APIXABAN 5 MG: 5 TABLET, FILM COATED ORAL at 17:08

## 2025-05-22 RX ADMIN — MONTELUKAST 10 MG: 10 TABLET, FILM COATED ORAL at 21:06

## 2025-05-22 RX ADMIN — PIPERACILLIN AND TAZOBACTAM 4.5 G: 36; 4.5 INJECTION, POWDER, LYOPHILIZED, FOR SOLUTION INTRAVENOUS at 08:04

## 2025-05-22 RX ADMIN — INSULIN LISPRO 2 UNITS: 100 INJECTION, SOLUTION INTRAVENOUS; SUBCUTANEOUS at 08:08

## 2025-05-22 RX ADMIN — Medication 1 TABLET: at 09:19

## 2025-05-22 RX ADMIN — LISINOPRIL 20 MG: 20 TABLET ORAL at 09:19

## 2025-05-22 RX ADMIN — ACETAMINOPHEN 975 MG: 325 TABLET, FILM COATED ORAL at 14:55

## 2025-05-22 RX ADMIN — ATENOLOL 25 MG: 50 TABLET ORAL at 09:20

## 2025-05-22 RX ADMIN — FAMOTIDINE 20 MG: 20 TABLET, FILM COATED ORAL at 09:19

## 2025-05-22 RX ADMIN — ACETAMINOPHEN 975 MG: 325 TABLET, FILM COATED ORAL at 05:27

## 2025-05-22 RX ADMIN — INSULIN LISPRO 4 UNITS: 100 INJECTION, SOLUTION INTRAVENOUS; SUBCUTANEOUS at 17:08

## 2025-05-22 RX ADMIN — BUDESONIDE, GLYCOPYRROLATE, AND FORMOTEROL FUMARATE 2 PUFF: 160; 9; 4.8 AEROSOL, METERED RESPIRATORY (INHALATION) at 09:22

## 2025-05-22 RX ADMIN — ACETAMINOPHEN 975 MG: 325 TABLET, FILM COATED ORAL at 21:06

## 2025-05-22 RX ADMIN — CLOPIDOGREL BISULFATE 75 MG: 75 TABLET, FILM COATED ORAL at 09:19

## 2025-05-22 NOTE — ASSESSMENT & PLAN NOTE
Patient had episode of dyspnea after blood transfusion   Most likely volume overload secondary to blood transfusions, IV fluids/antibiotics, in the setting of chronic hpf EF  most recent echocardiogram 10/24: Left ventricular ejection fraction 50%.  Grade 1 diastolic dysfunction.  Mild hypokinesis of the posterior wall  Chest x-ray: Moderate interstitial edema  Patient was placed on diuresis with IV Lasix, however unfortunately initially he was refusing doses, since agreeable  Weight fluctuating: Currently back to 84.8 kg on bed scale  Requesting accurate I's/O: Not noted past 24 hours

## 2025-05-22 NOTE — ASSESSMENT & PLAN NOTE
Lab Results   Component Value Date    HGBA1C 9.3 (H) 04/09/2025     Recent Labs     05/21/25  1133 05/21/25  1637 05/21/25  2111 05/22/25  0723   POCGLU 101 318* 283* 225*     Prior to admission meds: glipizide, linagliptin and metformin  A1c 9.3.  Endocrinology consulted by vascular surgery  Oral agents on hold preoperatively  Accu-Cheks and sliding scale insulin

## 2025-05-22 NOTE — PLAN OF CARE
Problem: PAIN - ADULT  Goal: Verbalizes/displays adequate comfort level or baseline comfort level  Description: Interventions:- Encourage patient to monitor pain and request assistance- Assess pain using appropriate pain scale- Administer analgesics based on type and severity of pain and evaluate response- Implement non-pharmacological measures as appropriate and evaluate response- Consider cultural and social influences on pain and pain management- Notify physician/advanced practitioner if interventions unsuccessful or patient reports new pain  Outcome: Progressing     Problem: DISCHARGE PLANNING  Goal: Discharge to home or other facility with appropriate resources  Description: INTERVENTIONS:- Identify barriers to discharge w/patient and caregiver- Arrange for needed discharge resources and transportation as appropriate- Identify discharge learning needs (meds, wound care, etc.)- Arrange for interpretive services to assist at discharge as needed- Refer to Case Management Department for coordinating discharge planning if the patient needs post-hospital services based on physician/advanced practitioner order or complex needs related to functional status, cognitive ability, or social support system  Outcome: Progressing     Problem: SKIN/TISSUE INTEGRITY - ADULT  Goal: Skin Integrity remains intact(Skin Breakdown Prevention)  Description: Assess:-Perform Sae assessment -Clean and moisturize skin -Inspect skin when repositioning, toileting, and assisting with ADLS-Assess under medical devices -Assess extremities for adequate circulation and sensation Bed Management:-Have minimal linens on bed & keep smooth, unwrinkled-Change linens as needed when moist or perspiring-Avoid sitting or lying in one position for more than 2 hours while in bed-Keep HOB at 30 degrees Toileting:-Offer bedside commode-Assess for incontinence -Use incontinent care products after each incontinent episode Activity:-Mobilize patient 3 times  a day-Encourage activity and walks on unit-Encourage or provide ROM exercises -Turn and reposition patient every 2 Hours-Use appropriate equipment to lift or move patient in bed-Instruct/ Assist with weight shifting  when out of bed in chair-Consider limitation of chair time 2 hour intervalsSkin Care:-Avoid use of baby powder, tape, friction and shearing, hot water or constrictive clothing-Relieve pressure over bony prominences Do not massage red bony areasNext Steps:-Teach patient strategies to minimize risks Consider consults to  interdisciplinary teams   Outcome: Progressing  Goal: Incision(s), wounds(s) or drain site(s) healing without S/S of infection  Description: INTERVENTIONS- Assess and document dressing, incision, wound bed, drain sites and surrounding tissue- Provide patient and family education- Perform skin care/dressing changes every shift  Outcome: Progressing  Goal: Pressure injury heals and does not worsen  Description: Interventions:- Implement low air loss mattress or specialty surface (Criteria met)- Apply silicone foam dressing- Instruct/assist with weight shifting every 120 minutes when in chair - Limit chair time to 2 hour intervals- Use special pressure reducing interventions such as turning when in chair - Apply fecal or urinary incontinence containment device - Perform passive or active ROM every 2 hours- Turn and reposition patient & offload bony prominences every 2 hours - Utilize friction reducing device or surface for transfers - Consider consults to  interdisciplinary teams such as wound- Use incontinent care products after each incontinent episode such as wipes- Consider nutrition services referral as needed  Outcome: Progressing     Problem: Nutrition/Hydration-ADULT  Goal: Nutrient/Hydration intake appropriate for improving, restoring or maintaining nutritional needs  Description: Monitor and assess patient's nutrition/hydration status for malnutrition. Collaborate with  interdisciplinary team and initiate plan and interventions as ordered.  Monitor patient's weight and dietary intake as ordered or per policy. Utilize nutrition screening tool and intervene as necessary. Determine patient's food preferences and provide high-protein, high-caloric foods as appropriate. INTERVENTIONS:- Monitor oral intake, urinary output, labs, and treatment plans- Assess nutrition and hydration status and recommend course of action- Evaluate amount of meals eaten- Assist patient with eating if necessary - Allow adequate time for meals- Recommend/ encourage appropriate diets, oral nutritional supplements, and vitamin/mineral supplements- Order, calculate, and assess calorie counts as needed- Recommend, monitor, and adjust tube feedings and TPN/PPN based on assessed needs- Assess need for intravenous fluids- Provide specific nutrition/hydration education as appropriate- Include patient/family/caregiver in decisions related to nutrition  Outcome: Progressing     Problem: Prexisting or High Potential for Compromised Skin Integrity  Goal: Skin integrity is maintained or improved  Description: INTERVENTIONS:  - Identify patients at risk for skin breakdown  - Assess and monitor skin integrity  - Assess and monitor nutrition and hydration status  - Monitor labs   - Assess for incontinence   - Turn and reposition patient  - Assist with mobility/ambulation  - Relieve pressure over bony prominences  - Avoid friction and shearing  - Provide appropriate hygiene as needed including keeping skin clean and dry  - Evaluate need for skin moisturizer/barrier cream  - Collaborate with interdisciplinary team   - Patient/family teaching  - Consider wound care consult   Outcome: Progressing     Problem: INFECTION - ADULT  Goal: Absence or prevention of progression during hospitalization  Description: INTERVENTIONS:  - Assess and monitor for signs and symptoms of infection  - Monitor lab/diagnostic results  - Monitor all  insertion sites, i.e. indwelling lines, tubes, and drains  - Monitor endotracheal if appropriate and nasal secretions for changes in amount and color  - [place] appropriate cooling/warming therapies per order  - Administer medications as ordered  - Instruct and encourage patient and family to use good hand hygiene technique  - Identify and instruct in appropriate isolation precautions for identified infection/condition  Outcome: Progressing  Goal: Absence of fever/infection during neutropenic period  Description: INTERVENTIONS:  - Monitor WBC    Outcome: Progressing     Problem: SAFETY ADULT  Goal: Patient will remain free of falls  Description: INTERVENTIONS:  - Educate patient/family on patient safety including physical limitations  - Instruct patient to call for assistance with activity   - Consult OT/PT to assist with strengthening/mobility   - Keep Call bell within reach  - Keep bed low and locked with side rails adjusted as appropriate  - Keep care items and personal belongings within reach  - Initiate and maintain comfort rounds  - Make Fall Risk Sign visible to staff  - Offer Toileting every 2 Hours, in advance of need  - Initiate/Maintain bed alarm  - Obtain necessary fall risk management equipment:   - Apply yellow socks and bracelet for high fall risk patients  - Consider moving patient to room near nurses station  Outcome: Progressing  Goal: Maintain or return to baseline ADL function  Description: INTERVENTIONS:  -  Assess patient's ability to carry out ADLs; assess patient's baseline for ADL function and identify physical deficits which impact ability to perform ADLs (bathing, care of mouth/teeth, toileting, grooming, dressing, etc.)  - Assess/evaluate cause of self-care deficits   - Assess range of motion  - Assess patient's mobility; develop plan if impaired  - Assess patient's need for assistive devices and provide as appropriate  - Encourage maximum independence but intervene and supervise when  necessary  - Involve family in performance of ADLs  - Assess for home care needs following discharge   - Consider OT consult to assist with ADL evaluation and planning for discharge  - Provide patient education as appropriate  Outcome: Progressing  Goal: Maintains/Returns to pre admission functional level  Description: INTERVENTIONS:  - Perform AM-PAC 6 Click Basic Mobility/ Daily Activity assessment daily.  - Set and communicate daily mobility goal to care team and patient/family/caregiver.   - Collaborate with rehabilitation services on mobility goals if consulted  - Perform Range of Motion 4 times a day.  - Reposition patient every 3 hours.  - Dangle patient 3 times a day  - Stand patient 3 times a day  - Ambulate patient 3 times a day  - Out of bed to chair 3 times a day   - Out of bed for meals 3 times a day  - Out of bed for toileting  - Record patient progress and toleration of activity level   Outcome: Progressing     Problem: Knowledge Deficit  Goal: Patient/family/caregiver demonstrates understanding of disease process, treatment plan, medications, and discharge instructions  Description: Complete learning assessment and assess knowledge base.  Interventions:  - Provide teaching at level of understanding  - Provide teaching via preferred learning methods  Outcome: Progressing     Problem: METABOLIC, FLUID AND ELECTROLYTES - ADULT  Goal: Electrolytes maintained within normal limits  Description: INTERVENTIONS:  - Monitor labs and assess patient for signs and symptoms of electrolyte imbalances  - Administer electrolyte replacement as ordered  - Monitor response to electrolyte replacements, including repeat lab results as appropriate  - Instruct patient on fluid and nutrition as appropriate  Outcome: Progressing     Problem: HEMATOLOGIC - ADULT  Goal: Maintains hematologic stability  Description: INTERVENTIONS  - Assess for signs and symptoms of bleeding or hemorrhage  - Monitor labs  - Administer supportive  blood products/factors as ordered and appropriate  Outcome: Progressing     Problem: CARDIOVASCULAR - ADULT  Goal: Maintains optimal cardiac output and hemodynamic stability  Description: INTERVENTIONS:  - Monitor I/O, vital signs and rhythm  - Monitor for S/S and trends of decreased cardiac output  - Administer and titrate ordered vasoactive medications to optimize hemodynamic stability  - Assess quality of pulses, skin color and temperature  - Assess for signs of decreased coronary artery perfusion  - Instruct patient to report change in severity of symptoms  Outcome: Progressing  Goal: Absence of cardiac dysrhythmias or at baseline rhythm  Description: INTERVENTIONS:  - Continuous cardiac monitoring, vital signs, obtain 12 lead EKG if ordered  - Administer antiarrhythmic and heart rate control medications as ordered  - Monitor electrolytes and administer replacement therapy as ordered  Outcome: Progressing

## 2025-05-22 NOTE — ASSESSMENT & PLAN NOTE
Known chronic diabetic foot ulcer, MRI in April suggestive of osteomyelitis of fifth metatarsal head.  Admitted with acute wound cellulitis status post right partial fifth ray resection with assumed surgical cure of osteomyelitis.  There was yellow viscous fluid within the metatarsal phalangeal joint consistent with infection.  On dressing change 5/19 patient had ongoing green-colored drainage concerning for ongoing soft tissue infection.  Patient will need further right foot washout pending cardiology clearance.  OR cx from 5/16 grew Serratia, Enterococcus faecalis and Pseudomonas.  - Continue IV Zosyn   - Patient to go to the OR today for partial fourth ray amputation and wound debridement  - If surgical cure can be obtained of osteomyelitis then anticipate 5 to 7-day postop course antibiotic

## 2025-05-22 NOTE — PROGRESS NOTES
Progress Note - Cardiology   Name: Gold Van 79 y.o. male I MRN: [id number]  Unit/Bed#: [place] [address] -01 I Date of Admission: 5/11/2025   Date of Service: 5/22/2025 I Hospital Day: 11      Date of initial consultation: 5/19/2025    Diagnoses:  Acute pulmonary edema and heart failure exacerbation, HFpEF  Osteomyelitis of the foot  Severe occlusive PAD  Subclinical coronary artery disease with extensive coronary artery calcification  Hypertension  Diabetes mellitus  Paroxysmal atrial fibrillation and paroxysmal supraventricular tachycardia  Postoperative anemia.  Secondary to blood loss  Mild intermittent asthma anemia    Assessment & Plan  Chronic heart failure with preserved ejection fraction (HFpEF) ([facility])    TODAY'S (05/21/25) ASSESSMENT   HPI / Last 24 hours Interval developments noted.  Patient had sustained tachycardia last night.  Suspect automatic atrial tachycardia leading up to atrial fibrillation with RVR.  Converted back to sinus rhythm.   Subjective Patient reports improved shortness of breath.  Denies chest pain or palpitations.  Not ambulating   VITALS highlights Heart rate currently in 80s; blood pressure  120s-130s/ 60s-70s 94-96% on room air   EXAM highlights Lean build, conjunctival pallor, no JVD, no carotid bruit, regular rate rhythm, normal intensity heart sounds, decreased breath sounds at bases without any crackles, surgical dressing in right lower extremity, ganglion cyst in the right wrist region, bruising and subcutaneous bleed in right arm.   LABS highlights 132 potassium 4.2 chloride 100 bicarb 24 BUN 18 creatinine 1.06 GFR 66  Glucose 325  Calcium 7.7  Magnesium 2.0  Hemoglobin 9.0 hematocrit 27.9 platelet count 568  High-sensitivity troponins 163 -- 155 -- 193 -- 237.  -- 159 yesterday   on 5/19/2025   ECHO and other studies TTE 5/19/2025: EF 50%, low normal, decrease GLS -11%, grade 1 diastolic dysfunction, hypokinesis of mid anteroseptal and apical septal walls; normal RV  size and function; mild LA enlargement and normal RA size, prominent eustachian valve or cor triatriatum, lipomatous hypertrophy of interatrial septum, mildly thickened AV with mild AI, thickened MV with mild MAC and MV calcification, mild MR, trace TR, no obvious pulmonary hypertension, no pericardial effusion.    Cardiac SPECT 1/9/2025: Normal perfusion, EF 65%.    TTE 10/01/24: LVEF 50%, mild hypokinesia of the posterior wall, grade I DD, mild AR     Marcel@103 5/19/2025: Moderate interstitial edema, normal cardiomediastinal silhouette significant pleural effusion   Telemetry & ECG Currently not on telemetry.  ECG 5/22/2025 lateral ST changes suggestive of ischemia similar to previous ECG from 5/19/2025.   Current GDMT Apixaban 5 mg twice daily + atenolol 25 mg daily + atorvastatin 40 mg daily + amlodipine 5 mg twice daily + clopidogrel 75 mg daily + furosemide 40 mg IV twice daily + lisinopril 20 mg p.o. twice daily     Interval developments of tachycardia noted.  Suspect automatic atrial tachycardia and atrial fibrillation with RVR last night.  Is currently in sinus rhythm since last night.  Is asymptomatic.  On examination does not appear to be in a volume overloaded state.  Hemoglobin and hematocrit is borderline but stable.  high-sensitivity troponins have trended down since peak on 5/19/2025.  Patient needs surgical intervention to his foot.      Patient okay to proceed to the OR today.  Need close hemodynamic monitoring for arrhythmia as well as for ACS.  Can get IV Lopressor if he develops any arrhythmia events during or immediately postoperative state. Avoid tachycardia and large fluid shifts during the procedure.  Request adequate postoperative pain management.  Request postoperative hemoglobin and hematocrit along with ECG and high-sensitivity troponin.  Transfuse if hemoglobin is less than 9 g/dL or if it is between 9 and 10  and patient is showing signs of ischemia.  Will need additional furosemide 20  mg IV.  Patient is receiving blood transfusion.  Will continue current cardiac medications.   Sepsis without acute organ dysfunction (HCC)  Ulcer of right foot with fat layer exposed secondary to osteomyelitis  Severe peripheral arterial disease (HCC)  - met SIRS criteria at [facility] with 05/07/25 with tachycardia and leukocytosis   - 5/16: Underwent right partial fifth ray resection: presumed surgical cure  - s/p right CFA SFA endarterectomy/stenting 5/13/2025   - currently on Plavix 75 mg daily (podiatry and vascular following)  Essential hypertension  - BP overall stable during admission   - home rx: amlodipine 5 mg daily, lisinopril 20 mg daily, atenolol 25 mg daily   PAF (paroxysmal atrial fibrillation) ([facility])  - o 01/06/25: predominant NSR with 1 NSVT lasting 5 beats, 2% AF/flutter burden  - rate control: atenolol 25 mg daily   - AC: Eliquis 5 mg BID  Troponin I above reference range  - troponin trend: 163 > 155 > 193 > 237  - EKG shows nonspecific ST and T wave abnormalities   Type 2 diabetes mellitus with complication, without long-term current use of insulin ([facility])  Lab Results   Component Value Date    HGBA1C 9.3 (H) 04/09/2025   Continue  Postoperative anemia  - baseline ~ 10-12 with drop to 7 after surgery   - s/p 1 unit of PRBCs 05/18/25   Mild intermittent asthma without complication      Subjective   Chief Complaint: Noted above in HPI    Objective :  Temp:  [98.3 °F (36.8 °C)-99.3 °F (37.4 °C)] 98.5 °F (36.9 °C)  HR:  [] 82  BP: (124-136)/(64-75) 133/67  Resp:  [16-19] 16  SpO2:  [91 %-96 %] 94 %  O2 Device: None (Room air)  Orthostatic Blood Pressures      Flowsheet Row Most Recent Value   Blood Pressure 133/67 filed at 05/22/2025 0923   Patient Position - Orthostatic VS Lying filed at 05/21/2025 0735          First Weight: Weight - Scale: 72.4 kg (159 lb 11.2 oz) (05/12/25 0553)  Vitals:    05/21/25 0600 05/22/25 0600   Weight: 83.5 kg (184 lb 1.4 oz) 84.8 kg (186 lb 15.2 oz)      Physical Exam  Constitutional:       Appearance: He is normal weight.   Neck:      Vascular: No carotid bruit.     Cardiovascular:      Rate and Rhythm: Normal rate and regular rhythm.      Heart sounds: No murmur heard.  Pulmonary:      Effort: Pulmonary effort is normal. No respiratory distress.      Breath sounds: Normal breath sounds. No wheezing or rales.   Abdominal:      Palpations: Abdomen is soft.     Musculoskeletal:      Cervical back: Neck supple.      Right lower leg: No edema.      Left lower leg: No edema.     Skin:     General: Skin is warm and dry.      Capillary Refill: Capillary refill takes less than 2 seconds.      Findings: Lesion present.      Comments: Surgical dressing in right lower extremity.  Bruising and ecchymosis in upper extremity.     Neurological:      Mental Status: He is alert and oriented to person, place, and time. Mental status is at baseline.     Psychiatric:         Mood and Affect: Mood normal.         Behavior: Behavior normal.         Lab Results: I have reviewed the following results:CBC/BMP:   .     05/21/25 2018 05/22/25 0529   WBC  --  7.61   HGB  --  9.0*   HCT  --  27.9*   PLT  --  568*   SODIUM 132* 135   K 4.2 3.9    102   CO2 24 26   BUN 18 14   CREATININE 1.06 0.89   GLUC 325* 217*   MG 2.0  --       Results from last 7 days   Lab Units 05/22/25  0529 05/21/25  0458 05/20/25  0603   WBC Thousand/uL 7.61 10.65* 13.20*   HEMOGLOBIN g/dL 9.0* 9.1* 9.6*   HEMATOCRIT % 27.9* 28.4* 30.2*   PLATELETS Thousands/uL 568* 532* 520*     Results from last 7 days   Lab Units 05/22/25  0529 05/21/25 2018 05/21/25  0458   POTASSIUM mmol/L 3.9 4.2 3.9   CHLORIDE mmol/L 102 100 98   CO2 mmol/L 26 24 26   BUN mg/dL 14 18 12   CREATININE mg/dL 0.89 1.06 0.98   CALCIUM mg/dL 8.0* 7.7* 8.2*     Results from last 7 days   Lab Units 05/15/25  2314 05/15/25  1636 05/15/25  1019   PTT seconds 53* 47* 52*     Lab Results   Component Value Date    HGBA1C 9.3 (H) 04/09/2025      Lab Results   Component Value Date    CKTOTAL 292 09/26/2024    TROPONINI <0.03 03/24/2021       Imaging Results Review: I reviewed radiology reports from this admission including: chest xray and Echocardiogram.  Other Study Results Review: EKG was reviewed.     VTE Pharmacologic Prophylaxis: VTE covered by:  apixaban, Oral, 5 mg at 05/22/25 0919

## 2025-05-22 NOTE — PROGRESS NOTES
Podiatry - Progress Note  Patient: Gold Van 79 y.o. male   MRN: [id number]  PCP: Shayne Diaz MD  Unit/Bed#: [address] 217-01 Encounter: [id number]  Date Of Visit: 25    ASSESSMENT:    Gold Van is a 79 y.o. male with:    Right 5th metatarsal ulceration with underlying OM ([place] [facility])  - s/p right fifth ray resection (DOS: 25)  Right foot cellulitis  PAD  - s/p right femoral endarterectomy and antegrade endovascular intervention (DOS: 25)  Type 2 diabetes mellitus  - A1c: 9.3% (25)      PLAN:    Unfortunately due to OR availability and scheduling conflicts, patient's R foot partial 4th ray amputation and wound debridement will be rescheduled to tomorrow . Per cardiology, they would prefer the case be during the day, earlier in the day rather than later, given that he is higher risk. Patient expresses understanding.   NPO at midnight.   Primary team updated.   Continue abx per ID.  Elevation and offloading on green foam wedges or pillows when non-ambulatory.  Rest of care per primary team.     Weightbearing status: Non-weightbearing right foot    SUBJECTIVE:     The patient was seen, evaluated, and assessed at bedside today. The patient was awake, alert, and in no acute distress. No acute events overnight. The patient reports that pain is controled on current regimen. Patient denies N/V/F/chills/SOB/CP.      OBJECTIVE:     Vitals:   /60   Pulse 76   Temp 98.4 °F (36.9 °C)   Resp 16   Ht 6' (1.829 m)   Wt 84.8 kg (186 lb 15.2 oz)   SpO2 95%   BMI 25.35 kg/m²     Temp (24hrs), Av.7 °F (37.1 °C), Min:98.4 °F (36.9 °C), Max:99.3 °F (37.4 °C)      Physical Exam:     Lungs: Non labored breathing  Abdomen: Soft, non-tender.  Lower Extremity:  Cardiovascular status at baseline from admission.  Neurological status at baseline from admission.  Musculoskeletal status at baseline from admission. No calf tenderness noted.   Dressings to RLE remain clean, dry intact no  "strikethrough.    Additional Data:     Labs:    Results from last 7 days   Lab Units 05/22/25  0529   WBC Thousand/uL 7.61   HEMOGLOBIN g/dL 9.0*   HEMATOCRIT % 27.9*   PLATELETS Thousands/uL 568*   SEGS PCT % 67   LYMPHO PCT % 18   MONO PCT % 10   EOS PCT % 2     Results from last 7 days   Lab Units 05/22/25  0529 05/17/25  0542 05/16/25  0545   POTASSIUM mmol/L 3.9   < > 4.0   CHLORIDE mmol/L 102   < > 103   CO2 mmol/L 26   < > 25   BUN mg/dL 14   < > 14   CREATININE mg/dL 0.89   < > 0.87   CALCIUM mg/dL 8.0*   < > 8.1*   ALK PHOS U/L  --   --  51   ALT U/L  --   --  4*   AST U/L  --   --  12*    < > = values in this interval not displayed.           * I Have Reviewed All Lab Data Listed Above.    Recent Cultures (last 7 days):     Results from last 7 days   Lab Units 05/16/25  1337   GRAM STAIN RESULT  Rare Gram positive cocci in pairs*  No polys seen*   WOUND CULTURE  3+ Growth of Serratia marcescens*  3+ Growth of Enterococcus faecalis*  1+ Growth of Pseudomonas aeruginosa*     Results from last 7 days   Lab Units 05/16/25  1337   ANAEROBIC CULTURE  No anaerobes isolated       Imaging: I have personally reviewed pertinent films in PACS  EKG, Pathology, and Other Studies: I have personally reviewed pertinent reports.    ** Please Note: Portions of the record may have been created with voice recognition software. Occasional wrong word or \"sound a like\" substitutions may have occurred due to the inherent limitations of voice recognition software. Read the chart carefully and recognize, using context, where substitutions have occurred. **      "

## 2025-05-22 NOTE — ASSESSMENT & PLAN NOTE
- met SIRS criteria at [facility] with 05/07/25 with tachycardia and leukocytosis   - 5/16: Underwent right partial fifth ray resection: presumed surgical cure  - s/p right CFA SFA endarterectomy/stenting 5/13/2025   - currently on Plavix 75 mg daily (podiatry and vascular following)

## 2025-05-22 NOTE — PROGRESS NOTES
Progress Note - Infectious Disease   Name: Gold Van 79 y.o. male I MRN: [id number]  Unit/Bed#: [place] [address] -01 I Date of Admission: 5/11/2025   Date of Service: 5/22/2025 I Hospital Day: 11     Assessment & Plan  Sepsis without acute organ dysfunction (HCC)  Admit on initial presentation to [facility] with tachycardia and leukocytosis, secondary to diabetic right foot infection.  Blood cultures negative.  As below patient is with ongoing surgical site infection.  He is afebrile and hemodynamically stable.  -Antibiotic plan as below  -Repeat CBC tomorrow to trend WBC  -Monitor fever curve and hemodynamics  -Ongoing podiatry recommendations appreciated  -Supportive care per primary team  Ulcer of right foot with fat layer exposed secondary to osteomyelitis  Known chronic diabetic foot ulcer, MRI in April suggestive of osteomyelitis of fifth metatarsal head.  Admitted with acute wound cellulitis status post right partial fifth ray resection with assumed surgical cure of osteomyelitis.  There was yellow viscous fluid within the metatarsal phalangeal joint consistent with infection.  On dressing change 5/19 patient had ongoing green-colored drainage concerning for ongoing soft tissue infection.  Patient will need further right foot washout pending cardiology clearance.  OR cx from 5/16 grew Serratia, Enterococcus faecalis and Pseudomonas.  - Continue IV Zosyn   - Patient to go to the OR today for partial fourth ray amputation and wound debridement  - If surgical cure can be obtained of osteomyelitis then anticipate 5 to 7-day postop course antibiotic  Osteomyelitis (HCC)  Of fifth metatarsal head in setting of diabetic foot ulcer.  Now status post partial fifth ray resection with presumed surgical cure on 5/13.  -Follow-up repeat podiatry amputation and washout today  -Will need to confirm if there is any ongoing concern for residual bone infection postop  Severe peripheral arterial disease (HCC)  Status post  vascular surgery intervention on 5/13 with right common femoral endarterectomy with bovine pericardial patch angioplasty, right SFA stenting, right angioplasty.  Type 2 diabetes mellitus with complication, without long-term current use of insulin ([facility])  Lab Results   Component Value Date    HGBA1C 9.3 (H) 04/09/2025   Significant risk factor for infection and poor wound healing.  -Tight glycemic control as per primary team  PAF (paroxysmal atrial fibrillation) ([facility])  On Eliquis at home.  Chronic heart failure with preserved ejection fraction (HFpEF) ([facility])  Cardiology following.    I have discussed the above management plan in detail with the primary service.     Antibiotics:  Zosyn    Subjective   Plans are to go to OR today with podiatry.    He has no new symptoms.  Denies fever, chills, nausea, abdominal pain, diarrhea.    Objective :  Temp:  [98.3 °F (36.8 °C)-99.3 °F (37.4 °C)] 98.4 °F (36.9 °C)  HR:  [] 76  BP: (114-136)/(60-75) 114/60  Resp:  [16-19] 16  SpO2:  [91 %-96 %] 95 %  O2 Device: None (Room air)    General:  No acute distress  Psychiatric:  Awake and alert  Pulmonary:  Normal respiratory excursion without accessory muscle use  Abdomen:  Soft, nontender  Extremities: Left foot with intact dressing.  Skin:  No rashes      Lab Results: I have reviewed the following results:  Results from last 7 days   Lab Units 05/22/25  0529 05/21/25  0458 05/20/25  0603   WBC Thousand/uL 7.61 10.65* 13.20*   HEMOGLOBIN g/dL 9.0* 9.1* 9.6*   PLATELETS Thousands/uL 568* 532* 520*     Results from last 7 days   Lab Units 05/22/25  0529 05/21/25  2018 05/21/25  0458 05/17/25  0542 05/16/25  0545   SODIUM mmol/L 135 132* 133*   < > 134*   POTASSIUM mmol/L 3.9 4.2 3.9   < > 4.0   CHLORIDE mmol/L 102 100 98   < > 103   CO2 mmol/L 26 24 26   < > 25   BUN mg/dL 14 18 12   < > 14   CREATININE mg/dL 0.89 1.06 0.98   < > 0.87   EGFR ml/min/1.73sq m 81 66 73   < > 82   CALCIUM mg/dL 8.0* 7.7* 8.2*   < > 8.1*   AST U/L  --    --   --   --  12*   ALT U/L  --   --   --   --  4*   ALK PHOS U/L  --   --   --   --  51   ALBUMIN g/dL  --   --   --   --  2.6*    < > = values in this interval not displayed.     Results from last 7 days   Lab Units 05/16/25  2300   GRAM STAIN RESULT  Rare Gram positive cocci in pairs*  No polys seen*   WOUND CULTURE  3+ Growth of Serratia marcescens*  3+ Growth of Enterococcus faecalis*  1+ Growth of Pseudomonas aeruginosa*

## 2025-05-22 NOTE — ASSESSMENT & PLAN NOTE
Prior baseline hemoglobin appears to range 10-12  Postprocedure hemoglobin decreased to 7.0 when patient was symptomatic: Secondary to expected procedural blood loss  On 5/18 transfused 1 unit PRBC   Repeat hemoglobin 9's  Hemoglobin stable: Monitor closely postprocedure    Results from last 7 days   Lab Units 05/22/25  0529 05/21/25  0458 05/20/25  0603   HEMOGLOBIN g/dL 9.0* 9.1* 9.6*   MCV fL 92 92 92

## 2025-05-22 NOTE — ASSESSMENT & PLAN NOTE
Prior to admission on [facility].  No exacerbation  Albuterol metered-dose inhaler as needed

## 2025-05-22 NOTE — PROGRESS NOTES
Progress Note - Vascular Surgery   Name: Gold Van 79 y.o. 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 Pt admitted and started on ABX. Pt was transferred to [facility] on 5/11/25 for vascular surgical intervention.     Vascular surgery consulted for worsening R 5th met wound in the setting of PAD. CTA abd shows L CFA/SFA stenosis w/ focal occlusions and AT/peroneal occlusions w/ reconstitution. LEAD shows R PTA occlusion and R RIC: 0.42/15/16.    -Pt is S/P R CFA/SFA endarterectomy w/ bovine pericardial patch, DCB and stenting of SFA, and angio of AT on 5/13.   -Pt is S/P R partial 5th ray resection by podiatry on 5/16.    Diagnostics:  -5/6/25 CTA abd w/ runoff: B/L EDY/EIA/IIA patent w/ atherosclerosis. Right: Focal high-grade stenosis of distal R CFA and diffuse atherosclerotic disease of R SFA resulting in moderate to severe stenoses with focal near complete occlusions at the proximal and distal segments. Short segment occlusions of proximal R AT and peroneal arteries with reconstitution. R PTA is occluded. Left: Occluded L SFA with reconstitution. L YUNG and PTA occluded. One-vessel runoff LLE through peroneal artery.  -4/18/25 LEAD: Right: >75% prox SFA and 50-75% dSFA stenosis. Distal PTA occlusion. R RIC: 0.42/15/16. Left: PTA and YUNG occlusions. L RIC: 1.06/85/45.   -4/17/25 MRI R foot: Possible early OM in R 5th met    Plan:  -R 5th met wound (+) OM in the setting of PAD  -S/P R CFA/SFA endarterectomy w/ bovine pericardial patch, DCB and stenting of SFA, and angio of AT on 5/13 (POD #9)  -Continue  daily incision care to R groin by nursing   -Continue plavix (new SFA stents) and lipitor for medical management of PAD   -Continue eliquis for hx of afib  -Podiatry following, S/P R partial 5th ray resection on 5/16  -Podiatry plan for R partial 4th ray amputation and wound debridment in OR today (5/22)  -Intra-op cx from 5/16 (+) for serratia, enterococcus faecalis and pseudomonas   -ID following for ABX management; input appreciated  -Cardiology following for CHF management; input appreciated   -Continue medical management per primary team  -Case management following w/ plans for STR  -Pt is stable for discharge from a vascular surgery standpoint  -Will continue to follow peripherally while pt remains hospitalized  -Plan follow up outpt in vascular surgery office; appt scheduled for 6/5/25  -Will discuss w/ Dr. Cee    Subjective : Pt seen for exam while lying in bed; NAD. Pt denies any complaints at this time.    Objective :  Temp:  [98.4 °F (36.9 °C)-99.3 °F (37.4 °C)] 98.4 °F (36.9 °C)  HR:  [] 76  BP: (114-133)/(60-75) 114/60  Resp:  [16-19] 16  SpO2:  [91 %-96 %] 95 %  O2 Device: None (Room air)     I/O         05/20 0701  05/21 0700 05/21 0701  05/22 0700 05/22 0701  05/23 0700    P.O. 510 200 0    I.V. (mL/kg)  10 (0.1)     Total Intake(mL/kg) 510 (6.1) 210 (2.5) 0 (0)    Urine (mL/kg/hr) 1875 (0.9) 600 (0.3) 275 (0.6)    Stool   0    Total Output 1875 600 275    Net -1365 -390 -275           Unmeasured Stool Occurrence   1 x            Physical Exam  Vitals and nursing note reviewed.   Constitutional:       General: He is awake. He is not in acute distress.     Appearance: Normal appearance. He is well-developed.   HENT:      Head: Normocephalic and atraumatic.     Cardiovascular:      Rate and Rhythm: Normal rate and regular rhythm.      Pulses:           Dorsalis pedis pulses are detected w/ Doppler on the right side and detected w/ Doppler on the left side.        Posterior tibial pulses are  detected w/ Doppler on the right side and detected w/ Doppler on the left side.      Heart sounds: Normal heart sounds.      Comments: Trace R pedal edema. Multiphasic R DP/PT signals.  Pulmonary:      Effort: Pulmonary effort is normal. No respiratory distress.   Abdominal:      General: There is no distension.      Palpations: Abdomen is soft.     Musculoskeletal:         General: No swelling.      Cervical back: Neck supple.      Right lower leg: Edema present.     Skin:     General: Skin is warm and dry.      Capillary Refill: Capillary refill takes less than 2 seconds.      Findings: Wound present.      Comments: R 5th met surgical site. R groin incision.     Neurological:      General: No focal deficit present.      Mental Status: He is alert and oriented to person, place, and time. Mental status is at baseline.     Psychiatric:         Mood and Affect: Mood normal.         Speech: Speech normal.         Behavior: Behavior normal. Behavior is cooperative.         Thought Content: Thought content normal.         Judgment: Judgment normal.       Wound/Incision:  R 5th metatarsal surgical wound w/ dressing clean, dry and intact. R groin incision site soft, well-approx, no swelling, ecchymosis, erythema or drainage, staples intact.       Lab Results: I have reviewed the following results:  CBC with diff:   Lab Results   Component Value Date    WBC 7.61 05/22/2025    HGB 9.0 (L) 05/22/2025    HCT 27.9 (L) 05/22/2025    MCV 92 05/22/2025     (H) 05/22/2025    RBC 3.02 (L) 05/22/2025    MCH 29.8 05/22/2025    MCHC 32.3 05/22/2025    RDW 16.2 (H) 05/22/2025    MPV 9.0 05/22/2025    NRBC 0 05/22/2025     BMP/CMP:  Lab Results   Component Value Date    SODIUM 135 05/22/2025    K 3.9 05/22/2025    K 4.5 04/14/2015     05/22/2025     04/14/2015    CO2 26 05/22/2025    CO2 22 05/13/2025    ANIONGAP 8 04/14/2015    BUN 14 05/22/2025    BUN 10 04/14/2015    CREATININE 0.89 05/22/2025    CREATININE 0.86  04/14/2015    GLUCOSE 262 (H) 05/13/2025    GLUCOSE 179 (H) 04/14/2015    CALCIUM 8.0 (L) 05/22/2025    CALCIUM 8.9 04/14/2015    AST 12 (L) 05/16/2025    AST 12 08/12/2014    ALT 4 (L) 05/16/2025    ALT 19 08/12/2014    ALKPHOS 51 05/16/2025    ALKPHOS 101 08/12/2014    PROT 6.8 08/12/2014    BILITOT 0.6 08/12/2014    EGFR 81 05/22/2025     Coags:   Lab Results   Component Value Date    PTT 53 (H) 05/15/2025    INR 1.14 05/07/2025     Blood Culture:   Lab Results   Component Value Date    BLOODCX No Growth After 5 Days. 05/07/2025    BLOODCX No Growth After 5 Days. 05/07/2025     Urinalysis:   Lab Results   Component Value Date    COLORU Yellow 05/07/2025    COLORU Yellow 08/12/2014    CLARITYU Clear 05/07/2025    CLARITYU Clear 08/12/2014    SPECGRAV 1.010 05/07/2025    SPECGRAV 1.025 08/12/2014    PHUR 6.0 05/07/2025    PHUR 6.0 06/14/2016    PHUR 6.0 08/12/2014    LEUKOCYTESUR Negative 05/07/2025    LEUKOCYTESUR Negative 08/12/2014    NITRITE Negative 05/07/2025    NITRITE Negative 08/12/2014    PROTEINUA Negative 08/12/2014    GLUCOSEU Negative 05/07/2025    GLUCOSEU 100 (1/10%) (A) 08/12/2014    KETONESU Negative 05/07/2025    KETONESU Negative 08/12/2014    BILIRUBINUR Negative 05/07/2025    BILIRUBINUR Negative 08/12/2014    BLOODU 1+ (A) 05/07/2025    BLOODU Trace (A) 08/12/2014     Wound Culure:   Lab Results   Component Value Date    WOUNDCULT 3+ Growth of Serratia marcescens (A) 05/16/2025    WOUNDCULT 3+ Growth of Enterococcus faecalis (A) 05/16/2025    WOUNDCULT 1+ Growth of Pseudomonas aeruginosa (A) 05/16/2025

## 2025-05-22 NOTE — ASSESSMENT & PLAN NOTE
Patient developed acute onset of dyspnea evening of 5/18 after blood transfusion  EKG with transient lateral ST depression  Troponins elevated 163-> 155-> 193-> 237  1/25 nuclear stress test: Ejection fraction 65%.  No evidence of ischemia  Appreciate Cardiology eval: elevated trop due to volume overload  On review of CT scan patient did have quite significant coronary calcifications  Continue medical therapy with Plavix, statin, and beta-blocker  Continue to monitor closely perioperatively  Postop EKG/troponin planned

## 2025-05-22 NOTE — DISCHARGE SUPPORT
DCS received task from CM to submit SNF auth, however, therapy notes are required to be within 48 hours of requested SOC date. At this time, updated OT notes are needed prior to submitting auth request to insurance. CM advised to request updated therapy notes and re-task DCS once requested therapy notes are in chart.     CM notified:  Edith ALVAREZ

## 2025-05-22 NOTE — PROGRESS NOTES
Progress Note - Hospitalist   Name: Gold Van 79 y.o. male I MRN: [id number]  Unit/Bed#: [place] [address] -01 I Date of Admission: 5/11/2025   Date of Service: 5/22/2025 I Hospital Day: 11    Assessment & Plan  Sepsis without acute organ dysfunction (HCC)  Patient is a 79-year-old male with past medical history significant for DM2, PAF, asthma, and PAD who presented with right foot wound  Met sepsis criteria at [facility] initially  Diagnosed with right fifth metatarsal osteomyelitis with right foot cellulitis, started on abx and transferred here for vascular surgery  S/p vascular surgery and right fifth ray amputation 5/16  intraoperative cultures: Serratia/Enterococcus and pseudomonas  Blood culture negative x 2  Appreciate evaluation and recommendations from infectious disease team: Continue antibiotics with Zosyn  Anticipate return to the OR today  Per ID: If surgical cure anticipated, recommend 5-7 days postop antibiotic  Troponin I above reference range  Patient developed acute onset of dyspnea evening of 5/18 after blood transfusion  EKG with transient lateral ST depression  Troponins elevated 163-> 155-> 193-> 237  1/25 nuclear stress test: Ejection fraction 65%.  No evidence of ischemia  Appreciate Cardiology eval: elevated trop due to volume overload  On review of CT scan patient did have quite significant coronary calcifications  Continue medical therapy with Plavix, statin, and beta-blocker  Continue to monitor closely perioperatively  Postop EKG/troponin planned  Volume overload  Patient had episode of dyspnea after blood transfusion   Most likely volume overload secondary to blood transfusions, IV fluids/antibiotics, in the setting of chronic hpf EF  most recent echocardiogram 10/24: Left ventricular ejection fraction 50%.  Grade 1 diastolic dysfunction.  Mild hypokinesis of the posterior wall  Chest x-ray: Moderate interstitial edema  Patient was placed on diuresis with IV Lasix, however  unfortunately initially he was refusing doses, since agreeable  Weight fluctuating: Currently back to 84.8 kg on bed scale  Requesting accurate I's/O: Not noted past 24 hours  Severe peripheral arterial disease (HCC)  Transferred here for vascular surgery intervention  Underwent right CFA SFA endarterectomy/stenting 5/13/2025  Aspirin discontinued.  Started on clopidogrel for stents  Continue statin  Patient is also on Eliquis for paroxysmal atrial fibrillation  Discussed with vascular surgery team: No additional intervention required from their standpoint they will see in office follow-up  Osteomyelitis (HCC)  Right fifth metatarsal ulceration with underlying osteomyelitis and surrounding right foot cellulitis  Treated with cefazolin/Flagyl empirically  5/16: Underwent right partial fifth ray resection: presumed surgical cure  Wound care/VAC per podiatry: Removed 5/17 secondary to bleeding  Upon recheck patient's wound had significant drainage with surrounding cellulitis  Current cultures with Serratia/Enterococcus, and Pseudomonas  Antibiotics changed to IV Zosyn  ID consult appreciated  Podiatry planning repeat operative intervention  Postoperative anemia  Prior baseline hemoglobin appears to range 10-12  Postprocedure hemoglobin decreased to 7.0 when patient was symptomatic: Secondary to expected procedural blood loss  On 5/18 transfused 1 unit PRBC   Repeat hemoglobin 9's  Hemoglobin stable: Monitor closely postprocedure    Results from last 7 days   Lab Units 05/22/25  0529 05/21/25  0458 05/20/25  0603   HEMOGLOBIN g/dL 9.0* 9.1* 9.6*   MCV fL 92 92 92     Type 2 diabetes mellitus with complication, without long-term current use of insulin ([facility])  Lab Results   Component Value Date    HGBA1C 9.3 (H) 04/09/2025     Recent Labs     05/21/25  1133 05/21/25  1637 05/21/25  2111 05/22/25  0723   POCGLU 101 318* 283* 225*     Prior to admission meds: glipizide, linagliptin and metformin  A1c 9.3.  Endocrinology  "consulted by vascular surgery  Oral agents on hold preoperatively  Accu-Cheks and sliding scale insulin  PAF (paroxysmal atrial fibrillation) ([facility])  Follows with [facility] cardiology: Most recent note 4/25 was reviewed  Continue atenolol 25 mg daily.  Anticoagulation: Apixaban   Patient's beta-blocker was held yesterday morning due to borderline blood pressures and had subsequent episode of narrow complex tachycardia last evening: This appeared to be atrial tachycardia and then atrial fibrillation: Given metoprolol 5 mg IV x 1 currently in sinus rhythm  Continue beta-blocker (changed hold parameters)  Essential hypertension  Continue atenolol 25 mg daily and lisinopril 20 mg twice daily  Home amlodipine on hold due to low-normal blood pressures  Blood pressure adequately controlled, continue to monitor with diuresis  Mild intermittent asthma without complication  Prior to admission on [facility].  No exacerbation  Albuterol metered-dose inhaler as needed  Pruritus  Chronic pruritus follows with dermatology as an outpatient on prednisone 5 mg daily  Chronic heart failure with preserved ejection fraction (HFpEF) ([facility])  Last known LVEF 50% echocardiogram 2024  Patient being treated for volume overload secondary to blood transfusion/IV fluids as described above  Lung nodule  Chest x-ray with incidental finding of \"nodular opacity at left base\"  Outpatient chest x-ray in 6 weeks    VTE Pharmacologic Prophylaxis: VTE Score: 5 High Risk (Score >/= 5) - Pharmacological DVT Prophylaxis Ordered: apixaban (Eliquis). Sequential Compression Devices Ordered.    Mobility:   Basic Mobility Inpatient Raw Score: 10  JH-HLM Goal: 4: Move to chair/commode  JH-HLM Achieved: 4: Move to chair/commode  JH-HLM Goal achieved. Continue to encourage appropriate mobility.    Patient Centered Rounds: I performed bedside rounds with nursing staff today.   Discussions with Specialists or Other Care Team Provider: d/w cardiology dr harris and " podiatry on call team    Education and Discussions with Family / Patient: updated pts wife via phone-await OR time.  Updated pt at bedside    Current Length of Stay: 11 day(s)  Current Patient Status: Inpatient   Certification Statement: The patient will continue to require additional inpatient hospital stay due to operative intervention planned today, postoperative monitoring  Discharge Plan: Anticipate discharge in >72 hrs to rehab facility.    Code Status: Level 1 - Full Code    Subjective   Patient was examined and interviewed earlier today.  He denied any complaints.  Since last evening he did not have any additional episodes of palpitations.  Denies any current palpitations or heart racing.  Denies any chest pain, chest pressure or chest tightness.  Denies any shortness of breath.  Denies any cough.  Denies any nausea, vomiting, diarrhea.  Is tolerating p.o.-although n.p.o. today.  Notes yesterday he had a good appetite once he was allowed to eat.  Denies any dizziness or lightheadedness.    Objective :  Temp:  [98.3 °F (36.8 °C)-99.3 °F (37.4 °C)] 98.5 °F (36.9 °C)  HR:  [] 81  BP: (124-136)/(64-75) 124/64  Resp:  [16-19] 16  SpO2:  [91 %-96 %] 96 %  O2 Device: None (Room air)    Body mass index is 25.35 kg/m².     Input and Output Summary (last 24 hours):     Intake/Output Summary (Last 24 hours) at 5/22/2025 0892  Last data filed at 5/22/2025 0532  Gross per 24 hour   Intake 200 ml   Output 600 ml   Net -400 ml       Physical Exam  General: Very pleasant male.  No acute distress.  Nontachypneic and nondyspneic  Heart: Regular rate and rhythm.  S1-S2 present.  No murmur, rub or gallop  Lungs: Decreased breath sounds at both bases.  Otherwise clear to auscultation.  No wheezes, crackles, rhonchi.  No accessory muscle use or respiratory distress  Abdomen: Soft, nontender with palpation.  Nondistended.  Normal active bowel sounds present.  No guarding or rebound.  No peritoneal signs or mass  Extremities:  Right foot dressing in place.  Neurologic: Awake and alert.  Interactive    Lines/Drains:        Telemetry:  Telemetry Orders (From admission, onward)               24 Hour Telemetry Monitoring  Continuous x 24 Hours (Telem)        Expiring   Question:  Reason for 24 Hour Telemetry  Answer:  Arrhythmias requiring acute medical intervention / PPM or ICD malfunction                     Telemetry Reviewed: Normal Sinus Rhythm  Indication for Continued Telemetry Use: Arrthymias requiring medical therapy               Lab Results: I have reviewed the following results:   Results from last 7 days   Lab Units 05/22/25  0529   WBC Thousand/uL 7.61   HEMOGLOBIN g/dL 9.0*   HEMATOCRIT % 27.9*   PLATELETS Thousands/uL 568*   SEGS PCT % 67   LYMPHO PCT % 18   MONO PCT % 10   EOS PCT % 2     Results from last 7 days   Lab Units 05/22/25  0529 05/17/25  0542 05/16/25  0545   SODIUM mmol/L 135   < > 134*   POTASSIUM mmol/L 3.9   < > 4.0   CHLORIDE mmol/L 102   < > 103   CO2 mmol/L 26   < > 25   BUN mg/dL 14   < > 14   CREATININE mg/dL 0.89   < > 0.87   ANION GAP mmol/L 7   < > 6   CALCIUM mg/dL 8.0*   < > 8.1*   ALBUMIN g/dL  --   --  2.6*   TOTAL BILIRUBIN mg/dL  --   --  0.30   ALK PHOS U/L  --   --  51   ALT U/L  --   --  4*   AST U/L  --   --  12*   GLUCOSE RANDOM mg/dL 217*   < > 167*    < > = values in this interval not displayed.         Results from last 7 days   Lab Units 05/22/25  0723 05/21/25  2111 05/21/25  1637 05/21/25  1133 05/21/25  0736 05/20/25  2141 05/20/25  1617 05/20/25  1130 05/20/25  0751 05/19/25  2126 05/19/25  1626 05/19/25  1058   POC GLUCOSE mg/dl 225* 283* 318* 101 119 267* 209* 148* 87 144* 197* 210*               Recent Cultures (last 7 days):   Results from last 7 days   Lab Units 05/16/25  1337   GRAM STAIN RESULT  Rare Gram positive cocci in pairs*  No polys seen*   WOUND CULTURE  3+ Growth of Serratia marcescens*  3+ Growth of Enterococcus faecalis*  1+ Growth of Pseudomonas aeruginosa*      ===========================================  Imagin/18 chest x-ray  moderate interstitial edema.  Nodular opacity at the left base, not visible on prior studies. Recommend follow-up with a chest radiograph with dual energy subtraction in 6 weeks to assure resolution      right foot x-rayPostop right foot.  No acute osseous abnormality.      vein mapping  RIGHT LOWER LIMB:   The great saphenous vein is patent  from the groin to the ankle.   The intraluminal diameter measurements range from 2.2 mm to 12.6 mm throughout.   LEFT LOWER LIMB:   The great saphenous vein is patent  from the groin to the ankle.   The intraluminal diameter measurements range from 2.1 mm to 9 mm throughout.       right foot x-ray: Ulceration along the lateral aspect of the foot and soft tissue swelling along the dorsum of the foot without radiographic findings of osteomyelitis.       venous duplex  RIGHT LOWER LIMB   No evidence of acute or chronic deep vein thrombosis.   No evidence of superficial thrombophlebitis noted.   Doppler evaluation shows a normal response to augmentation maneuvers.   Popliteal, posterior tibial and anterior tibial arterial Doppler waveforms are monophasic (Known PAD).   Note: There is a well-defined hypoechoic non-vascularized cystic-type structure noted in the popliteal fossa.      LEFT LOWER LIMB LIMITED   Evaluation shows no evidence of thrombus in the common femoral vein.    Doppler evaluation shows a normal response to augmentation maneuvers.      CTA abdomen with runoff  1. Focal high-grade stenosis of distal right CFA and diffuse atherosclerotic disease of right SFA resulting in moderate to severe stenoses with focal near complete occlusions at the proximal and distal segments.  2. Short segment occlusions of proximal right anterior tibial and peroneal arteries with reconstitution in the medial to distal segments. Right posterior tibial artery is occluded.  3. Occluded left SFA  with reconstitution in the distal segment. Left anterior tibial and posterior tibial arteries are occluded. One-vessel runoff of the left lower extremity through the peroneal artery.  4. Focal high-grade stenosis at the proximal right renal artery.        Microbiology  5/16 anaerobic culture: Negative  5/16 wound culture: 3+ Serratia.  3+ Enterococcus faecalis,pseudomonas.  5/7 blood culture: Negative x 2        =============================================      Last 24 Hours Medication List:     Current Facility-Administered Medications:     acetaminophen (TYLENOL) tablet 975 mg, Q8H SANDRA    albuterol (PROVENTIL HFA,VENTOLIN HFA) inhaler 1 puff, Q4H PRN    [Held by provider] amLODIPine (NORVASC) tablet 5 mg, BID    apixaban (ELIQUIS) tablet 5 mg, BID    atenolol (TENORMIN) tablet 25 mg, Daily    atorvastatin (LIPITOR) tablet 40 mg, QPM    Budeson-Glycopyrrol-Formoterol 160-9-4.8 MCG/ACT AERO 2 puff, BID    clopidogrel (PLAVIX) tablet 75 mg, Daily    docusate sodium (COLACE) capsule 100 mg, BID    famotidine (PEPCID) tablet 20 mg, BID    [Held by provider] furosemide (LASIX) injection 40 mg, BID (diuretic)    [Held by provider] glipiZIDE (GLUCOTROL XL) 24 hr tablet 10 mg, BID AC    insulin lispro (HumALOG/ADMELOG) 100 units/mL subcutaneous injection 1-5 Units, TID AC **AND** Fingerstick Glucose (POCT), TID AC    insulin lispro (HumALOG/ADMELOG) 100 units/mL subcutaneous injection 1-5 Units, HS    levalbuterol (XOPENEX) inhalation solution 1.25 mg, Q6H PRN    lidocaine (LIDODERM) 5 % patch 1 patch, Daily    lisinopril (ZESTRIL) tablet 20 mg, BID    [Held by provider] metFORMIN (GLUCOPHAGE-XR) 24 hr tablet 500 mg, Daily With Dinner    metoprolol (LOPRESSOR) injection 5 mg, Q6H PRN    montelukast (SINGULAIR) tablet 10 mg, HS    morphine injection 2 mg, Q4H PRN    multivitamin-minerals (CENTRUM) tablet 1 tablet, Daily    ondansetron (ZOFRAN) injection 4 mg, Q6H PRN    oxyCODONE (ROXICODONE) IR tablet 5 mg, Q4H PRN **OR**  oxyCODONE (ROXICODONE) immediate release tablet 10 mg, Q4H PRN    [COMPLETED] piperacillin-tazobactam (ZOSYN) 4.5 g in sodium chloride 0.9 % 100 mL IV LOADING DOSE, Once, Last Rate: Stopped (05/20/25 1245) **FOLLOWED BY** piperacillin-tazobactam (ZOSYN) 4.5 g in sodium chloride 0.9 % 100 mL IVPB (EXTENDED INFUSION), Q8H, Last Rate: 4.5 g (05/22/25 0804)    polyethylene glycol (MIRALAX) packet 17 g, Daily PRN    predniSONE tablet 5 mg, Daily    senna (SENOKOT) tablet 8.6 mg, Daily    Administrative Statements   Today, Patient Was Seen By: Zulema Pitt MD      **Please Note: This note may have been constructed using a voice recognition system.**

## 2025-05-22 NOTE — ASSESSMENT & PLAN NOTE
"Chest x-ray with incidental finding of \"nodular opacity at left base\"  Outpatient chest x-ray in 6 weeks  " Self

## 2025-05-22 NOTE — ASSESSMENT & PLAN NOTE
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  Now status post partial fifth ray resection with presumed surgical cure on 5/13.  -Follow-up repeat podiatry amputation and washout today  -Will need to confirm if there is any ongoing concern for residual bone infection postop

## 2025-05-22 NOTE — PLAN OF CARE
Problem: PAIN - ADULT  Goal: Verbalizes/displays adequate comfort level or baseline comfort level  Description: Interventions:- Encourage patient to monitor pain and request assistance- Assess pain using appropriate pain scale- Administer analgesics based on type and severity of pain and evaluate response- Implement non-pharmacological measures as appropriate and evaluate response- Consider cultural and social influences on pain and pain management- Notify physician/advanced practitioner if interventions unsuccessful or patient reports new pain  Outcome: Progressing     Problem: DISCHARGE PLANNING  Goal: Discharge to home or other facility with appropriate resources  Description: INTERVENTIONS:- Identify barriers to discharge w/patient and caregiver- Arrange for needed discharge resources and transportation as appropriate- Identify discharge learning needs (meds, wound care, etc.)- Arrange for interpretive services to assist at discharge as needed- Refer to Case Management Department for coordinating discharge planning if the patient needs post-hospital services based on physician/advanced practitioner order or complex needs related to functional status, cognitive ability, or social support system  Outcome: Progressing     Problem: INFECTION - ADULT  Goal: Absence or prevention of progression during hospitalization  Description: INTERVENTIONS:  - Assess and monitor for signs and symptoms of infection  - Monitor lab/diagnostic results  - Monitor all insertion sites, i.e. indwelling lines, tubes, and drains  - Monitor endotracheal if appropriate and nasal secretions for changes in amount and color  - [place] appropriate cooling/warming therapies per order  - Administer medications as ordered  - Instruct and encourage patient and family to use good hand hygiene technique  - Identify and instruct in appropriate isolation precautions for identified infection/condition  Outcome: Progressing  Goal: Absence of  fever/infection during neutropenic period  Description: INTERVENTIONS:  - Monitor WBC    Outcome: Progressing     Problem: SAFETY ADULT  Goal: Patient will remain free of falls  Description: INTERVENTIONS:  - Educate patient/family on patient safety including physical limitations  - Instruct patient to call for assistance with activity   - Consult OT/PT to assist with strengthening/mobility   - Keep Call bell within reach  - Keep bed low and locked with side rails adjusted as appropriate  - Keep care items and personal belongings within reach  - Initiate and maintain comfort rounds  - Make Fall Risk Sign visible to staff  - Offer Toileting every 2 Hours, in advance of need  - Initiate/Maintain bed alarm  - Obtain necessary fall risk management equipment:   - Apply yellow socks and bracelet for high fall risk patients  - Consider moving patient to room near nurses station  Outcome: Progressing  Goal: Maintain or return to baseline ADL function  Description: INTERVENTIONS:  -  Assess patient's ability to carry out ADLs; assess patient's baseline for ADL function and identify physical deficits which impact ability to perform ADLs (bathing, care of mouth/teeth, toileting, grooming, dressing, etc.)  - Assess/evaluate cause of self-care deficits   - Assess range of motion  - Assess patient's mobility; develop plan if impaired  - Assess patient's need for assistive devices and provide as appropriate  - Encourage maximum independence but intervene and supervise when necessary  - Involve family in performance of ADLs  - Assess for home care needs following discharge   - Consider OT consult to assist with ADL evaluation and planning for discharge  - Provide patient education as appropriate  Outcome: Progressing  Goal: Maintains/Returns to pre admission functional level  Description: INTERVENTIONS:  - Perform AM-PAC 6 Click Basic Mobility/ Daily Activity assessment daily.  - Set and communicate daily mobility goal to care team  and patient/family/caregiver.   - Collaborate with rehabilitation services on mobility goals if consulted  - Perform Range of Motion 4 times a day.  - Reposition patient every 3 hours.  - Dangle patient 3 times a day  - Stand patient 3 times a day  - Ambulate patient 3 times a day  - Out of bed to chair 3 times a day   - Out of bed for meals 3 times a day  - Out of bed for toileting  - Record patient progress and toleration of activity level   Outcome: Progressing     Problem: Knowledge Deficit  Goal: Patient/family/caregiver demonstrates understanding of disease process, treatment plan, medications, and discharge instructions  Description: Complete learning assessment and assess knowledge base.  Interventions:  - Provide teaching at level of understanding  - Provide teaching via preferred learning methods  Outcome: Progressing     Problem: SKIN/TISSUE INTEGRITY - ADULT  Goal: Skin Integrity remains intact(Skin Breakdown Prevention)  Description: Assess:-Perform Sae assessment -Clean and moisturize skin -Inspect skin when repositioning, toileting, and assisting with ADLS-Assess under medical devices -Assess extremities for adequate circulation and sensation Bed Management:-Have minimal linens on bed & keep smooth, unwrinkled-Change linens as needed when moist or perspiring-Avoid sitting or lying in one position for more than 2 hours while in bed-Keep HOB at 30 degrees Toileting:-Offer bedside commode-Assess for incontinence -Use incontinent care products after each incontinent episode Activity:-Mobilize patient 3 times a day-Encourage activity and walks on unit-Encourage or provide ROM exercises -Turn and reposition patient every 2 Hours-Use appropriate equipment to lift or move patient in bed-Instruct/ Assist with weight shifting  when out of bed in chair-Consider limitation of chair time 2 hour intervalsSkin Care:-Avoid use of baby powder, tape, friction and shearing, hot water or constrictive clothing-Relieve  pressure over bony prominences Do not massage red bony areasNext Steps:-Teach patient strategies to minimize risks Consider consults to  interdisciplinary teams   Outcome: Progressing  Goal: Incision(s), wounds(s) or drain site(s) healing without S/S of infection  Description: INTERVENTIONS- Assess and document dressing, incision, wound bed, drain sites and surrounding tissue- Provide patient and family education- Perform skin care/dressing changes every shift  Outcome: Progressing  Goal: Pressure injury heals and does not worsen  Description: Interventions:- Implement low air loss mattress or specialty surface (Criteria met)- Apply silicone foam dressing- Instruct/assist with weight shifting every 120 minutes when in chair - Limit chair time to 2 hour intervals- Use special pressure reducing interventions such as turning when in chair - Apply fecal or urinary incontinence containment device - Perform passive or active ROM every 2 hours- Turn and reposition patient & offload bony prominences every 2 hours - Utilize friction reducing device or surface for transfers - Consider consults to  interdisciplinary teams such as wound- Use incontinent care products after each incontinent episode such as wipes- Consider nutrition services referral as needed  Outcome: Progressing     Problem: METABOLIC, FLUID AND ELECTROLYTES - ADULT  Goal: Electrolytes maintained within normal limits  Description: INTERVENTIONS:  - Monitor labs and assess patient for signs and symptoms of electrolyte imbalances  - Administer electrolyte replacement as ordered  - Monitor response to electrolyte replacements, including repeat lab results as appropriate  - Instruct patient on fluid and nutrition as appropriate  Outcome: Progressing     Problem: HEMATOLOGIC - ADULT  Goal: Maintains hematologic stability  Description: INTERVENTIONS  - Assess for signs and symptoms of bleeding or hemorrhage  - Monitor labs  - Administer supportive blood  products/factors as ordered and appropriate  Outcome: Progressing     Problem: CARDIOVASCULAR - ADULT  Goal: Maintains optimal cardiac output and hemodynamic stability  Description: INTERVENTIONS:  - Monitor I/O, vital signs and rhythm  - Monitor for S/S and trends of decreased cardiac output  - Administer and titrate ordered vasoactive medications to optimize hemodynamic stability  - Assess quality of pulses, skin color and temperature  - Assess for signs of decreased coronary artery perfusion  - Instruct patient to report change in severity of symptoms  Outcome: Progressing  Goal: Absence of cardiac dysrhythmias or at baseline rhythm  Description: INTERVENTIONS:  - Continuous cardiac monitoring, vital signs, obtain 12 lead EKG if ordered  - Administer antiarrhythmic and heart rate control medications as ordered  - Monitor electrolytes and administer replacement therapy as ordered  Outcome: Progressing     Problem: Nutrition/Hydration-ADULT  Goal: Nutrient/Hydration intake appropriate for improving, restoring or maintaining nutritional needs  Description: Monitor and assess patient's nutrition/hydration status for malnutrition. Collaborate with interdisciplinary team and initiate plan and interventions as ordered.  Monitor patient's weight and dietary intake as ordered or per policy. Utilize nutrition screening tool and intervene as necessary. Determine patient's food preferences and provide high-protein, high-caloric foods as appropriate. INTERVENTIONS:- Monitor oral intake, urinary output, labs, and treatment plans- Assess nutrition and hydration status and recommend course of action- Evaluate amount of meals eaten- Assist patient with eating if necessary - Allow adequate time for meals- Recommend/ encourage appropriate diets, oral nutritional supplements, and vitamin/mineral supplements- Order, calculate, and assess calorie counts as needed- Recommend, monitor, and adjust tube feedings and TPN/PPN based on  assessed needs- Assess need for intravenous fluids- Provide specific nutrition/hydration education as appropriate- Include patient/family/caregiver in decisions related to nutrition  Outcome: Progressing     Problem: Prexisting or High Potential for Compromised Skin Integrity  Goal: Skin integrity is maintained or improved  Description: INTERVENTIONS:  - Identify patients at risk for skin breakdown  - Assess and monitor skin integrity  - Assess and monitor nutrition and hydration status  - Monitor labs   - Assess for incontinence   - Turn and reposition patient  - Assist with mobility/ambulation  - Relieve pressure over bony prominences  - Avoid friction and shearing  - Provide appropriate hygiene as needed including keeping skin clean and dry  - Evaluate need for skin moisturizer/barrier cream  - Collaborate with interdisciplinary team   - Patient/family teaching  - Consider wound care consult   Outcome: Progressing

## 2025-05-22 NOTE — ASSESSMENT & PLAN NOTE
Patient is a 79-year-old male with past medical history significant for DM2, PAF, asthma, and PAD who presented with right foot wound  Met sepsis criteria at [facility] initially  Diagnosed with right fifth metatarsal osteomyelitis with right foot cellulitis, started on abx and transferred here for vascular surgery  S/p vascular surgery and right fifth ray amputation 5/16  intraoperative cultures: Serratia/Enterococcus and pseudomonas  Blood culture negative x 2  Appreciate evaluation and recommendations from infectious disease team: Continue antibiotics with Zosyn  Anticipate return to the OR today  Per ID: If surgical cure anticipated, recommend 5-7 days postop antibiotic

## 2025-05-22 NOTE — CASE MANAGEMENT
Case Management Discharge Planning Note    Patient name Gold Van  Location [place] [address] /[facility] 2 M* MRN [id number]  : 1945 Date 2025       Current Admission Date: 2025  Current Admission Diagnosis:Sepsis without acute organ dysfunction ([facility])   Patient Active Problem List    Diagnosis Date Noted    Osteomyelitis ([facility]) 2025    Troponin I above reference range 2025    Volume overload 2025    Postoperative anemia 2025    Pulmonary hypertension ([facility]) 2025    Sepsis without acute organ dysfunction ([facility]) 2025    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 ([facility]) 2025    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 ([facility]) 2025    PAF (paroxysmal atrial fibrillation) (Formerly [facility]) 2025    Chronic heart failure with preserved ejection fraction (HFpEF) ([facility]) 2025    Ulcer of right foot with fat layer exposed secondary to osteomyelitis 2025    Severe peripheral arterial disease ([facility]) 2024    Moderate protein-calorie malnutrition ([facility]) 10/09/2024    Gastroesophageal reflux disease without esophagitis 10/04/2024    Impaired mobility and activities of daily living 10/04/2024    Neuropathy 10/04/2024    Foot drop, left 10/04/2024    Abnormal echocardiogram 10/03/2024    CVA (cerebrovascular accident) ([facility]) 10/02/2024    Dysmetria 10/01/2024    COVID-19 2024    Acute respiratory insufficiency 2024    Shortness of breath 2024    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    History of pneumothorax 2024    Acute respiratory failure with hypoxia ([facility]) 2024    Non-recurrent bilateral inguinal hernia without obstruction or gangrene 2024    Pruritus 2024    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 2021    Hyponatremia 2021    Lung nodule 2021    Type 2 diabetes mellitus with complication, without long-term current use of insulin  ([facility]) 10/23/2017    Essential hypertension 10/23/2017    Mild intermittent asthma without complication 10/23/2017    Mixed hyperlipidemia 10/23/2017      LOS (days): 11  Geometric Mean LOS (GMLOS) (days): 9.6  Days to GMLOS:-0.9     OBJECTIVE:  Risk of Unplanned Readmission Score: 34.01         Current admission status: Inpatient   Preferred Pharmacy:   [facility] - [place], [place] - [address]  [address] [facility] [address]  Phone: [phone] Fax: [phone]    [facility] - [place], [place] - [address]  [address]  [facility] [address]  Phone: [phone] Fax: [phone]    [facility] ([id number] [place] [facility] [address] [address]  Phone: [phone] Fax: [phone]    Primary Care Provider: Shayne Diaz MD    Primary Insurance: e[employer] REP  Secondary Insurance:     DISCHARGE DETAILS:    Discharge planning discussed with:: Pt at bedside  [place] of Choice: Yes  Comments - Freedom of Choice: Pt provided with choice list at bed side. Pt would like to  reserve accepting facility; [facility].  CM contacted family/caregiver?: No- see comments (CM spoke with pt at bedside. CM to follow up w/ spouse at bedside when she arrives.)  Were Treatment Team discharge recommendations reviewed with patient/caregiver?: Yes  Did patient/caregiver verbalize understanding of patient care needs?: Yes  Were patient/caregiver advised of the risks associated with not following Treatment Team discharge recommendations?: Yes       Discharge Destination Plan:: Short Term Rehab, SNF ([facility])        Additional Comments: Reviewed choice list w/ pt at bedside. Pt agreeable to SNF; would like to reserve [facility].     CM spoke with patient at bedside and introduced self and role. CM provided patient with choice list at bedside. Pt choice was [facility] and [facility] in [place]. MELISSA  reserved provider in [place] and submitted order to [facility] for insurance authorization. CM to provide spouse with update at bedside when she arrives. CM department to continue to follow.

## 2025-05-22 NOTE — ASSESSMENT & PLAN NOTE
Follows with [facility] cardiology: Most recent note 4/25 was reviewed  Continue atenolol 25 mg daily.  Anticoagulation: Apixaban   Patient's beta-blocker was held yesterday morning due to borderline blood pressures and had subsequent episode of narrow complex tachycardia last evening: This appeared to be atrial tachycardia and then atrial fibrillation: Given metoprolol 5 mg IV x 1 currently in sinus rhythm  Continue beta-blocker (changed hold parameters)

## 2025-05-22 NOTE — ASSESSMENT & PLAN NOTE
TODAY'S (05/21/25) ASSESSMENT   HPI / Last 24 hours Interval developments noted.  Patient had sustained tachycardia last night.  Suspect automatic atrial tachycardia leading up to atrial fibrillation with RVR.  Converted back to sinus rhythm.   Subjective Patient reports improved shortness of breath.  Denies chest pain or palpitations.  Not ambulating   VITALS highlights Heart rate currently in 80s; blood pressure  120s-130s/ 60s-70s 94-96% on room air   EXAM highlights Lean build, conjunctival pallor, no JVD, no carotid bruit, regular rate rhythm, normal intensity heart sounds, decreased breath sounds at bases without any crackles, surgical dressing in right lower extremity, ganglion cyst in the right wrist region, bruising and subcutaneous bleed in right arm.   LABS highlights 132 potassium 4.2 chloride 100 bicarb 24 BUN 18 creatinine 1.06 GFR 66  Glucose 325  Calcium 7.7  Magnesium 2.0  Hemoglobin 9.0 hematocrit 27.9 platelet count 568  High-sensitivity troponins 163 -- 155 -- 193 -- 237.  -- 159 yesterday   on 5/19/2025   ECHO and other studies TTE 5/19/2025: EF 50%, low normal, decrease GLS -11%, grade 1 diastolic dysfunction, hypokinesis of mid anteroseptal and apical septal walls; normal RV size and function; mild LA enlargement and normal RA size, prominent eustachian valve or cor triatriatum, lipomatous hypertrophy of interatrial septum, mildly thickened AV with mild AI, thickened MV with mild MAC and MV calcification, mild MR, trace TR, no obvious pulmonary hypertension, no pericardial effusion.    Cardiac SPECT 1/9/2025: Normal perfusion, EF 65%.    TTE 10/01/24: LVEF 50%, mild hypokinesia of the posterior wall, grade I DD, mild AR     Marcel@103 5/19/2025: Moderate interstitial edema, normal cardiomediastinal silhouette significant pleural effusion   Telemetry & ECG Currently not on telemetry.  ECG 5/22/2025 lateral ST changes suggestive of ischemia similar to previous ECG from 5/19/2025.    Current GDMT Apixaban 5 mg twice daily + atenolol 25 mg daily + atorvastatin 40 mg daily + amlodipine 5 mg twice daily + clopidogrel 75 mg daily + furosemide 40 mg IV twice daily + lisinopril 20 mg p.o. twice daily     Interval developments of tachycardia noted.  Suspect automatic atrial tachycardia and atrial fibrillation with RVR last night.  Is currently in sinus rhythm since last night.  Is asymptomatic.  On examination does not appear to be in a volume overloaded state.  Hemoglobin and hematocrit is borderline but stable.  high-sensitivity troponins have trended down since peak on 5/19/2025.  Patient needs surgical intervention to his foot.      Patient okay to proceed to the OR today.  Need close hemodynamic monitoring for arrhythmia as well as for ACS.  Can get IV Lopressor if he develops any arrhythmia events during or immediately postoperative state. Avoid tachycardia and large fluid shifts during the procedure.  Request adequate postoperative pain management.  Request postoperative hemoglobin and hematocrit along with ECG and high-sensitivity troponin.  Transfuse if hemoglobin is less than 9 g/dL or if it is between 9 and 10  and patient is showing signs of ischemia.  Will need additional furosemide 20 mg IV.  Patient is receiving blood transfusion.  Will continue current cardiac medications.

## 2025-05-22 NOTE — ASSESSMENT & PLAN NOTE
- met SIRS criteria at [facility] with 05/07/25 with tachycardia and leukocytosis   - 5/16: Underwent right partial fifth ray resection: presumed surgical cure  - s/p right CFA SFA endarterectomy/stenting 5/13/2025   - currently on Plavix 75 mg daily (podiatry and vascular following)

## 2025-05-22 NOTE — ASSESSMENT & PLAN NOTE
Continue atenolol 25 mg daily and lisinopril 20 mg twice daily  Home amlodipine on hold due to low-normal blood pressures  Blood pressure adequately controlled, continue to monitor with diuresis

## 2025-05-22 NOTE — ASSESSMENT & PLAN NOTE
- met SIRS criteria at [facility] with 05/07/25 with tachycardia and leukocytosis   - 5/16: Underwent right partial fifth ray resection: presumed surgical cure  - s/p right CFA SFA endarterectomy/stenting 5/13/2025   - currently on Plavix 75 mg daily (podiatry and vascular following)

## 2025-05-22 NOTE — ASSESSMENT & PLAN NOTE
Admit on initial presentation to [facility] with tachycardia and leukocytosis, secondary to diabetic right foot infection.  Blood cultures negative.  As below patient is with ongoing surgical site infection.  He is afebrile and hemodynamically stable.  -Antibiotic plan as below  -Repeat CBC tomorrow to trend WBC  -Monitor fever curve and hemodynamics  -Ongoing podiatry recommendations appreciated  -Supportive care per primary team

## 2025-05-23 ENCOUNTER — APPOINTMENT (INPATIENT)
Dept: RADIOLOGY | Facility: HOSPITAL | Age: 80
DRG: 853 | End: 2025-05-23
Payer: COMMERCIAL

## 2025-05-23 LAB
2HR DELTA HS TROPONIN: -9 NG/L
4HR DELTA HS TROPONIN: -8 NG/L
ABO GROUP BLD: NORMAL
ANION GAP SERPL CALCULATED.3IONS-SCNC: 6 MMOL/L (ref 4–13)
BASOPHILS # BLD AUTO: 0.04 THOUSANDS/ÂΜL (ref 0–0.1)
BASOPHILS NFR BLD AUTO: 1 % (ref 0–1)
BLD GP AB SCN SERPL QL: NEGATIVE
BUN SERPL-MCNC: 11 MG/DL (ref 5–25)
CALCIUM SERPL-MCNC: 7.8 MG/DL (ref 8.4–10.2)
CARDIAC TROPONIN I PNL SERPL HS: 100 NG/L (ref ?–50)
CARDIAC TROPONIN I PNL SERPL HS: 91 NG/L (ref ?–50)
CARDIAC TROPONIN I PNL SERPL HS: 92 NG/L (ref ?–50)
CHLORIDE SERPL-SCNC: 104 MMOL/L (ref 96–108)
CO2 SERPL-SCNC: 24 MMOL/L (ref 21–32)
CREAT SERPL-MCNC: 0.94 MG/DL (ref 0.6–1.3)
EOSINOPHIL # BLD AUTO: 0.18 THOUSAND/ÂΜL (ref 0–0.61)
EOSINOPHIL NFR BLD AUTO: 3 % (ref 0–6)
ERYTHROCYTE [DISTWIDTH] IN BLOOD BY AUTOMATED COUNT: 16.1 % (ref 11.6–15.1)
GFR SERPL CREATININE-BSD FRML MDRD: 76 ML/MIN/1.73SQ M
GLUCOSE SERPL-MCNC: 170 MG/DL (ref 65–140)
GLUCOSE SERPL-MCNC: 182 MG/DL (ref 65–140)
GLUCOSE SERPL-MCNC: 184 MG/DL (ref 65–140)
GLUCOSE SERPL-MCNC: 188 MG/DL (ref 65–140)
GLUCOSE SERPL-MCNC: 191 MG/DL (ref 65–140)
GLUCOSE SERPL-MCNC: 209 MG/DL (ref 65–140)
HCT VFR BLD AUTO: 27.7 % (ref 36.5–49.3)
HCT VFR BLD AUTO: 29.8 % (ref 36.5–49.3)
HGB BLD-MCNC: 8.8 G/DL (ref 12–17)
HGB BLD-MCNC: 9.7 G/DL (ref 12–17)
IMM GRANULOCYTES # BLD AUTO: 0.11 THOUSAND/UL (ref 0–0.2)
IMM GRANULOCYTES NFR BLD AUTO: 2 % (ref 0–2)
LYMPHOCYTES # BLD AUTO: 1.55 THOUSANDS/ÂΜL (ref 0.6–4.47)
LYMPHOCYTES NFR BLD AUTO: 22 % (ref 14–44)
MAGNESIUM SERPL-MCNC: 2 MG/DL (ref 1.9–2.7)
MCH RBC QN AUTO: 29 PG (ref 26.8–34.3)
MCHC RBC AUTO-ENTMCNC: 31.8 G/DL (ref 31.4–37.4)
MCV RBC AUTO: 91 FL (ref 82–98)
MONOCYTES # BLD AUTO: 0.79 THOUSAND/ÂΜL (ref 0.17–1.22)
MONOCYTES NFR BLD AUTO: 11 % (ref 4–12)
NEUTROPHILS # BLD AUTO: 4.46 THOUSANDS/ÂΜL (ref 1.85–7.62)
NEUTS SEG NFR BLD AUTO: 61 % (ref 43–75)
NRBC BLD AUTO-RTO: 0 /100 WBCS
PLATELET # BLD AUTO: 570 THOUSANDS/UL (ref 149–390)
PMV BLD AUTO: 8.8 FL (ref 8.9–12.7)
POTASSIUM SERPL-SCNC: 4.2 MMOL/L (ref 3.5–5.3)
RBC # BLD AUTO: 3.03 MILLION/UL (ref 3.88–5.62)
RH BLD: POSITIVE
SODIUM SERPL-SCNC: 134 MMOL/L (ref 135–147)
SPECIMEN EXPIRATION DATE: NORMAL
WBC # BLD AUTO: 7.13 THOUSAND/UL (ref 4.31–10.16)

## 2025-05-23 PROCEDURE — 82948 REAGENT STRIP/BLOOD GLUCOSE: CPT

## 2025-05-23 PROCEDURE — 99024 POSTOP FOLLOW-UP VISIT: CPT | Performed by: NURSE PRACTITIONER

## 2025-05-23 PROCEDURE — 0Y6V0Z0 DETACHMENT AT RIGHT 4TH TOE, COMPLETE, OPEN APPROACH: ICD-10-PCS | Performed by: PODIATRIST

## 2025-05-23 PROCEDURE — 88311 DECALCIFY TISSUE: CPT | Performed by: PATHOLOGY

## 2025-05-23 PROCEDURE — 73630 X-RAY EXAM OF FOOT: CPT

## 2025-05-23 PROCEDURE — 99232 SBSQ HOSP IP/OBS MODERATE 35: CPT | Performed by: INTERNAL MEDICINE

## 2025-05-23 PROCEDURE — 28122 PARTIAL REMOVAL OF FOOT BONE: CPT | Performed by: PODIATRIST

## 2025-05-23 PROCEDURE — 86850 RBC ANTIBODY SCREEN: CPT

## 2025-05-23 PROCEDURE — 88305 TISSUE EXAM BY PATHOLOGIST: CPT | Performed by: PATHOLOGY

## 2025-05-23 PROCEDURE — 85025 COMPLETE CBC W/AUTO DIFF WBC: CPT | Performed by: INTERNAL MEDICINE

## 2025-05-23 PROCEDURE — 93005 ELECTROCARDIOGRAM TRACING: CPT

## 2025-05-23 PROCEDURE — 80048 BASIC METABOLIC PNL TOTAL CA: CPT | Performed by: INTERNAL MEDICINE

## 2025-05-23 PROCEDURE — G0545 PR INHERENT VISIT TO INPT: HCPCS | Performed by: INTERNAL MEDICINE

## 2025-05-23 PROCEDURE — 86900 BLOOD TYPING SEROLOGIC ABO: CPT

## 2025-05-23 PROCEDURE — 86901 BLOOD TYPING SEROLOGIC RH(D): CPT

## 2025-05-23 PROCEDURE — NC001 PR NO CHARGE: Performed by: PODIATRIST

## 2025-05-23 PROCEDURE — 28810 AMPUTATION TOE & METATARSAL: CPT | Performed by: PODIATRIST

## 2025-05-23 PROCEDURE — 85014 HEMATOCRIT: CPT

## 2025-05-23 PROCEDURE — 83735 ASSAY OF MAGNESIUM: CPT | Performed by: INTERNAL MEDICINE

## 2025-05-23 PROCEDURE — 86923 COMPATIBILITY TEST ELECTRIC: CPT

## 2025-05-23 PROCEDURE — 99233 SBSQ HOSP IP/OBS HIGH 50: CPT | Performed by: INTERNAL MEDICINE

## 2025-05-23 PROCEDURE — 85018 HEMOGLOBIN: CPT

## 2025-05-23 PROCEDURE — 84484 ASSAY OF TROPONIN QUANT: CPT

## 2025-05-23 RX ORDER — PROPOFOL 10 MG/ML
INJECTION, EMULSION INTRAVENOUS AS NEEDED
Status: DISCONTINUED | OUTPATIENT
Start: 2025-05-23 | End: 2025-05-23

## 2025-05-23 RX ORDER — MAGNESIUM HYDROXIDE 1200 MG/15ML
LIQUID ORAL AS NEEDED
Status: DISCONTINUED | OUTPATIENT
Start: 2025-05-23 | End: 2025-05-23 | Stop reason: HOSPADM

## 2025-05-23 RX ORDER — HYDROMORPHONE HCL/PF 1 MG/ML
SYRINGE (ML) INJECTION AS NEEDED
Status: DISCONTINUED | OUTPATIENT
Start: 2025-05-23 | End: 2025-05-23

## 2025-05-23 RX ORDER — LIDOCAINE HYDROCHLORIDE 10 MG/ML
INJECTION, SOLUTION EPIDURAL; INFILTRATION; INTRACAUDAL; PERINEURAL AS NEEDED
Status: DISCONTINUED | OUTPATIENT
Start: 2025-05-23 | End: 2025-05-23 | Stop reason: HOSPADM

## 2025-05-23 RX ORDER — SODIUM CHLORIDE, SODIUM LACTATE, POTASSIUM CHLORIDE, CALCIUM CHLORIDE 600; 310; 30; 20 MG/100ML; MG/100ML; MG/100ML; MG/100ML
INJECTION, SOLUTION INTRAVENOUS CONTINUOUS PRN
Status: DISCONTINUED | OUTPATIENT
Start: 2025-05-23 | End: 2025-05-23

## 2025-05-23 RX ORDER — ONDANSETRON 2 MG/ML
4 INJECTION INTRAMUSCULAR; INTRAVENOUS EVERY 6 HOURS PRN
Status: DISCONTINUED | OUTPATIENT
Start: 2025-05-23 | End: 2025-05-23 | Stop reason: HOSPADM

## 2025-05-23 RX ORDER — MIDAZOLAM HYDROCHLORIDE 2 MG/2ML
INJECTION, SOLUTION INTRAMUSCULAR; INTRAVENOUS AS NEEDED
Status: DISCONTINUED | OUTPATIENT
Start: 2025-05-23 | End: 2025-05-23

## 2025-05-23 RX ADMIN — APIXABAN 5 MG: 5 TABLET, FILM COATED ORAL at 08:32

## 2025-05-23 RX ADMIN — PIPERACILLIN AND TAZOBACTAM 4.5 G: 36; 4.5 INJECTION, POWDER, LYOPHILIZED, FOR SOLUTION INTRAVENOUS at 06:47

## 2025-05-23 RX ADMIN — INSULIN LISPRO 1 UNITS: 100 INJECTION, SOLUTION INTRAVENOUS; SUBCUTANEOUS at 17:35

## 2025-05-23 RX ADMIN — MORPHINE SULFATE 2 MG: 2 INJECTION, SOLUTION INTRAMUSCULAR; INTRAVENOUS at 17:33

## 2025-05-23 RX ADMIN — LISINOPRIL 20 MG: 20 TABLET ORAL at 21:02

## 2025-05-23 RX ADMIN — APIXABAN 5 MG: 5 TABLET, FILM COATED ORAL at 17:35

## 2025-05-23 RX ADMIN — PIPERACILLIN AND TAZOBACTAM 4.5 G: 36; 4.5 INJECTION, POWDER, LYOPHILIZED, FOR SOLUTION INTRAVENOUS at 23:38

## 2025-05-23 RX ADMIN — LISINOPRIL 20 MG: 20 TABLET ORAL at 08:32

## 2025-05-23 RX ADMIN — MORPHINE SULFATE 2 MG: 2 INJECTION, SOLUTION INTRAMUSCULAR; INTRAVENOUS at 23:37

## 2025-05-23 RX ADMIN — PROPOFOL 50 MG: 10 INJECTION, EMULSION INTRAVENOUS at 14:25

## 2025-05-23 RX ADMIN — ATORVASTATIN CALCIUM 40 MG: 40 TABLET, FILM COATED ORAL at 17:35

## 2025-05-23 RX ADMIN — BUDESONIDE, GLYCOPYRROLATE, AND FORMOTEROL FUMARATE 2 PUFF: 160; 9; 4.8 AEROSOL, METERED RESPIRATORY (INHALATION) at 20:58

## 2025-05-23 RX ADMIN — OXYCODONE HYDROCHLORIDE 10 MG: 10 TABLET ORAL at 20:58

## 2025-05-23 RX ADMIN — OXYCODONE HYDROCHLORIDE 10 MG: 10 TABLET ORAL at 16:24

## 2025-05-23 RX ADMIN — ACETAMINOPHEN 975 MG: 325 TABLET, FILM COATED ORAL at 06:47

## 2025-05-23 RX ADMIN — BUDESONIDE, GLYCOPYRROLATE, AND FORMOTEROL FUMARATE 2 PUFF: 160; 9; 4.8 AEROSOL, METERED RESPIRATORY (INHALATION) at 08:32

## 2025-05-23 RX ADMIN — FAMOTIDINE 20 MG: 20 TABLET, FILM COATED ORAL at 17:35

## 2025-05-23 RX ADMIN — INSULIN LISPRO 1 UNITS: 100 INJECTION, SOLUTION INTRAVENOUS; SUBCUTANEOUS at 21:00

## 2025-05-23 RX ADMIN — SODIUM CHLORIDE, SODIUM LACTATE, POTASSIUM CHLORIDE, AND CALCIUM CHLORIDE: .6; .31; .03; .02 INJECTION, SOLUTION INTRAVENOUS at 14:05

## 2025-05-23 RX ADMIN — CLOPIDOGREL BISULFATE 75 MG: 75 TABLET, FILM COATED ORAL at 08:32

## 2025-05-23 RX ADMIN — ACETAMINOPHEN 975 MG: 325 TABLET, FILM COATED ORAL at 21:00

## 2025-05-23 RX ADMIN — MIDAZOLAM HYDROCHLORIDE 2 MG: 1 INJECTION, SOLUTION INTRAMUSCULAR; INTRAVENOUS at 14:42

## 2025-05-23 RX ADMIN — MONTELUKAST 10 MG: 10 TABLET, FILM COATED ORAL at 21:00

## 2025-05-23 RX ADMIN — FAMOTIDINE 20 MG: 20 TABLET, FILM COATED ORAL at 08:32

## 2025-05-23 RX ADMIN — PROPOFOL 70 MCG/KG/MIN: 10 INJECTION, EMULSION INTRAVENOUS at 14:26

## 2025-05-23 RX ADMIN — INSULIN LISPRO 1 UNITS: 100 INJECTION, SOLUTION INTRAVENOUS; SUBCUTANEOUS at 11:29

## 2025-05-23 RX ADMIN — PIPERACILLIN AND TAZOBACTAM 4.5 G: 36; 4.5 INJECTION, POWDER, LYOPHILIZED, FOR SOLUTION INTRAVENOUS at 16:20

## 2025-05-23 RX ADMIN — ATENOLOL 25 MG: 50 TABLET ORAL at 08:32

## 2025-05-23 RX ADMIN — HYDROMORPHONE HYDROCHLORIDE 0.5 MG: 1 INJECTION, SOLUTION INTRAMUSCULAR; INTRAVENOUS; SUBCUTANEOUS at 14:46

## 2025-05-23 NOTE — ASSESSMENT & PLAN NOTE
Follows with [facility] cardiology: Most recent note 4/25 was reviewed  Continue atenolol 25 mg daily.  Anticoagulation: Apixaban   Patient's beta-blocker was held yesterday morning due to borderline blood pressures and had subsequent episode of narrow complex tachycardia last evening: This appeared to be atrial tachycardia and then atrial fibrillation: Given metoprolol 5 mg IV x 1 currently in sinus rhythm  Continue beta-blocker (changed hold parameters)

## 2025-05-23 NOTE — PROGRESS NOTES
Progress Note - Hospitalist   Name: Gold Van 79 y.o. male I MRN: [id number]  Unit/Bed#: [place] [address] -01 I Date of Admission: 5/11/2025   Date of Service: 5/23/2025 I Hospital Day: 12    Assessment & Plan  Sepsis without acute organ dysfunction (HCC)  Patient is a 79-year-old male with past medical history significant for DM2, PAF, asthma, and PAD who presented with right foot wound  Met sepsis criteria at [facility] initially  Diagnosed with right fifth metatarsal osteomyelitis with right foot cellulitis, started on abx and transferred here for vascular surgery  S/p vascular surgery and right fifth ray amputation 5/16  intraoperative cultures: Serratia/Enterococcus and pseudomonas  Blood culture negative x 2  Appreciate evaluation and recommendations from infectious disease team: Continue antibiotics with Zosyn  Anticipate return to the OR today (was postponed yesterday)  Per ID: If surgical cure anticipated, recommend 5-7 days postop antibiotic  Troponin I above reference range  Patient developed acute onset of dyspnea evening of 5/18 after blood transfusion  EKG with transient lateral ST depression  Troponins elevated 163-> 155-> 193-> 237  1/25 nuclear stress test: Ejection fraction 65%.  No evidence of ischemia  Appreciate Cardiology eval: elevated trop due to volume overload  On review of CT scan patient did have quite significant coronary calcifications  Continue medical therapy with Plavix, statin, and beta-blocker  Continue to monitor closely perioperatively  Postop EKG/troponin planned  Volume overload  Patient had episode of dyspnea after blood transfusion   Most likely volume overload secondary to blood transfusions, IV fluids/antibiotics, in the setting of chronic hpf EF  most recent echocardiogram 10/24: Left ventricular ejection fraction 50%.  Grade 1 diastolic dysfunction.  Mild hypokinesis of the posterior wall  Chest x-ray: Moderate interstitial edema  Patient was placed on diuresis with  IV Lasix with improvement  Weight fluctuatin-84kg  Again requesting accurate I's/O: Not noted past 24 hours  Euvolemic today for OR  Severe peripheral arterial disease (HCC)  Transferred here for vascular surgery intervention  Underwent right CFA SFA endarterectomy/stenting 2025  Aspirin discontinued.  Started on clopidogrel for stents  Continue statin  Patient is also on Eliquis for paroxysmal atrial fibrillation  Discussed with vascular surgery team: No additional intervention required from their standpoint they will see in office follow-up  Osteomyelitis (HCC)  Right fifth metatarsal ulceration with underlying osteomyelitis and surrounding right foot cellulitis  Treated with cefazolin/Flagyl empirically  : Underwent right partial fifth ray resection: presumed surgical cure  Wound care/VAC per podiatry: Removed  secondary to bleeding  Upon recheck patient's wound had significant drainage with surrounding cellulitis  Current cultures with Serratia/Enterococcus, and Pseudomonas  Antibiotics changed to IV Zosyn  ID consult appreciated  Podiatry planning repeat operative intervention  Postoperative anemia  Prior baseline hemoglobin appears to range 10-12  Postprocedure hemoglobin decreased to 7.0 when patient was symptomatic: Secondary to expected procedural blood loss  On  transfused 1 unit PRBC   Repeat hemoglobin 9's  Hemoglobin stable: Monitor closely postprocedure    Results from last 7 days   Lab Units 25  0503 25  0529 25  0458   HEMOGLOBIN g/dL 8.8* 9.0* 9.1*   MCV fL 91 92 92     Type 2 diabetes mellitus with complication, without long-term current use of insulin ([facility])  Lab Results   Component Value Date    HGBA1C 9.3 (H) 2025     Recent Labs     25  1116 25  1622 25  0738   POCGLU 198* 360* 254* 170*     Prior to admission meds: glipizide, linagliptin and metformin  A1c 9.3.  Endocrinology consulted by vascular surgery  Oral  "agents on hold preoperatively  Accu-Cheks and sliding scale insulin  PAF (paroxysmal atrial fibrillation) ([facility])  Follows with [facility] cardiology: Most recent note 4/25 was reviewed  Continue atenolol 25 mg daily.  Anticoagulation: Apixaban   Patient's beta-blocker was held yesterday morning due to borderline blood pressures and had subsequent episode of narrow complex tachycardia last evening: This appeared to be atrial tachycardia and then atrial fibrillation: Given metoprolol 5 mg IV x 1 currently in sinus rhythm  Continue beta-blocker (changed hold parameters)  Essential hypertension  Continue atenolol 25 mg daily and lisinopril 20 mg twice daily  Home amlodipine on hold due to low-normal blood pressures  Blood pressure adequately controlled, continue to monitor with diuresis  Mild intermittent asthma without complication  Prior to admission on [facility].  No exacerbation  Albuterol metered-dose inhaler as needed  Pruritus  Chronic pruritus follows with dermatology as an outpatient on prednisone 5 mg daily  Chronic heart failure with preserved ejection fraction (HFpEF) ([facility])  Last known LVEF 50% echocardiogram 2024  Patient being treated for volume overload secondary to blood transfusion/IV fluids as described above  Lung nodule  Chest x-ray with incidental finding of \"nodular opacity at left base\"  Outpatient chest x-ray in 6 weeks    VTE Pharmacologic Prophylaxis: VTE Score: 5 High Risk (Score >/= 5) - Pharmacological DVT Prophylaxis Ordered: apixaban (Eliquis). Sequential Compression Devices Ordered.    Mobility:   Basic Mobility Inpatient Raw Score: 10  JH-HLM Goal: 4: Move to chair/commode  JH-HLM Achieved: 4: Move to chair/commode  JH-HLM Goal achieved. Continue to encourage appropriate mobility.    Patient Centered Rounds: I performed bedside rounds with nursing staff today.   Discussions with Specialists or Other Care Team Provider: will d/w CM.  Updated podiatry on call team- ok to proceed to " OR    Education and Discussions with Family / Patient: updated pt at bedside and wife on speaker phone    Current Length of Stay: 12 day(s)  Current Patient Status: Inpatient   Certification Statement: The patient will continue to require additional inpatient hospital stay due to osteomyelitis, cellulitis for OR today  Discharge Plan: Anticipate discharge in >72 hrs to rehab facility.    Code Status: Level 1 - Full Code    Subjective   Pt denies any current pain.  Denies any pain in his foot.  Denies any pain anywhere else.  Denies any HA, cp/pressure, abd pain.  Denies any n/v/d/c. Passed BM yesterday.  Tolerating po.    Denies any sob.  Denies any pain anywhere else.  Denies any palpitations/heart fluttering    Objective :  Temp:  [97.8 °F (36.6 °C)-98.7 °F (37.1 °C)] 98.1 °F (36.7 °C)  HR:  [72-83] 83  BP: (106-138)/(51-67) 138/64  Resp:  [16-18] 16  SpO2:  [92 %-95 %] 95 %  O2 Device: None (Room air)    Body mass index is 24.82 kg/m².     Input and Output Summary (last 24 hours):     Intake/Output Summary (Last 24 hours) at 5/23/2025 0887  Last data filed at 5/23/2025 0647  Gross per 24 hour   Intake 750 ml   Output 675 ml   Net 75 ml       Physical Exam  Gen: pleasant male.  NAD  Heart: RRR no m/r/g  Lungs: CTA bilat.  No w/c/r.  No accessory muscle use or resp distress  Abd: soft, nt/nd, nabs.    Ext: R foot dressing/ACE wrap in place  Neuro: awake.  Alert. Fluent speech    Lines/Drains:        Telemetry:  Telemetry Orders (From admission, onward)               24 Hour Telemetry Monitoring  Continuous x 24 Hours (Telem)        Expiring   Question:  Reason for 24 Hour Telemetry  Answer:  Arrhythmias requiring acute medical intervention / PPM or ICD malfunction                     Telemetry Reviewed: Normal Sinus Rhythm  Indication for Continued Telemetry Use: Acute MI/Unstable Angina/Rule out ACS               Lab Results: I have reviewed the following results:   Results from last 7 days   Lab Units  25  0503   WBC Thousand/uL 7.13   HEMOGLOBIN g/dL 8.8*   HEMATOCRIT % 27.7*   PLATELETS Thousands/uL 570*   SEGS PCT % 61   LYMPHO PCT % 22   MONO PCT % 11   EOS PCT % 3     Results from last 7 days   Lab Units 25  0503   SODIUM mmol/L 134*   POTASSIUM mmol/L 4.2   CHLORIDE mmol/L 104   CO2 mmol/L 24   BUN mg/dL 11   CREATININE mg/dL 0.94   ANION GAP mmol/L 6   CALCIUM mg/dL 7.8*   GLUCOSE RANDOM mg/dL 182*         Results from last 7 days   Lab Units 25  0738 25  2029 25  1622 25  1116 25  0723 25  2111 25  1637 25  1133 25  0736 25  2141 25  1617 25  1130   POC GLUCOSE mg/dl 170* 254* 360* 198* 225* 283* 318* 101 119 267* 209* 148*               Recent Cultures (last 7 days):   Results from last 7 days   Lab Units 25  1337   GRAM STAIN RESULT  Rare Gram positive cocci in pairs*  No polys seen*   WOUND CULTURE  3+ Growth of Serratia marcescens*  3+ Growth of Enterococcus faecalis*  1+ Growth of Pseudomonas aeruginosa*     ==========================================  Imagin/18 chest x-ray  moderate interstitial edema.  Nodular opacity at the left base, not visible on prior studies. Recommend follow-up with a chest radiograph with dual energy subtraction in 6 weeks to assure resolution      right foot x-rayPostop right foot.  No acute osseous abnormality.      vein mapping  RIGHT LOWER LIMB:   The great saphenous vein is patent  from the groin to the ankle.   The intraluminal diameter measurements range from 2.2 mm to 12.6 mm throughout.   LEFT LOWER LIMB:   The great saphenous vein is patent  from the groin to the ankle.   The intraluminal diameter measurements range from 2.1 mm to 9 mm throughout.       right foot x-ray: Ulceration along the lateral aspect of the foot and soft tissue swelling along the dorsum of the foot without radiographic findings of osteomyelitis.       venous duplex  RIGHT LOWER  LIMB   No evidence of acute or chronic deep vein thrombosis.   No evidence of superficial thrombophlebitis noted.   Doppler evaluation shows a normal response to augmentation maneuvers.   Popliteal, posterior tibial and anterior tibial arterial Doppler waveforms are monophasic (Known PAD).   Note: There is a well-defined hypoechoic non-vascularized cystic-type structure noted in the popliteal fossa.      LEFT LOWER LIMB LIMITED   Evaluation shows no evidence of thrombus in the common femoral vein.    Doppler evaluation shows a normal response to augmentation maneuvers.     5/6 CTA abdomen with runoff  1. Focal high-grade stenosis of distal right CFA and diffuse atherosclerotic disease of right SFA resulting in moderate to severe stenoses with focal near complete occlusions at the proximal and distal segments.  2. Short segment occlusions of proximal right anterior tibial and peroneal arteries with reconstitution in the medial to distal segments. Right posterior tibial artery is occluded.  3. Occluded left SFA with reconstitution in the distal segment. Left anterior tibial and posterior tibial arteries are occluded. One-vessel runoff of the left lower extremity through the peroneal artery.  4. Focal high-grade stenosis at the proximal right renal artery.        Microbiology  5/16 anaerobic culture: Negative  5/16 wound culture: 3+ Serratia.  3+ Enterococcus faecalis,pseudomonas.  5/7 blood culture: Negative x 2        =============================================         Last 24 Hours Medication List:     Current Facility-Administered Medications:     acetaminophen (TYLENOL) tablet 975 mg, Q8H SANDRA    albuterol (PROVENTIL HFA,VENTOLIN HFA) inhaler 1 puff, Q4H PRN    [Held by provider] amLODIPine (NORVASC) tablet 5 mg, BID    apixaban (ELIQUIS) tablet 5 mg, BID    atenolol (TENORMIN) tablet 25 mg, Daily    atorvastatin (LIPITOR) tablet 40 mg, QPM    Budeson-Glycopyrrol-Formoterol 160-9-4.8 MCG/ACT AERO 2 puff, BID     clopidogrel (PLAVIX) tablet 75 mg, Daily    docusate sodium (COLACE) capsule 100 mg, BID    famotidine (PEPCID) tablet 20 mg, BID    [Held by provider] furosemide (LASIX) injection 40 mg, BID (diuretic)    [Held by provider] glipiZIDE (GLUCOTROL XL) 24 hr tablet 10 mg, BID AC    insulin lispro (HumALOG/ADMELOG) 100 units/mL subcutaneous injection 1-5 Units, TID AC **AND** Fingerstick Glucose (POCT), TID AC    insulin lispro (HumALOG/ADMELOG) 100 units/mL subcutaneous injection 1-5 Units, HS    levalbuterol (XOPENEX) inhalation solution 1.25 mg, Q6H PRN    lidocaine (LIDODERM) 5 % patch 1 patch, Daily    lisinopril (ZESTRIL) tablet 20 mg, BID    [Held by provider] metFORMIN (GLUCOPHAGE-XR) 24 hr tablet 500 mg, Daily With Dinner    metoprolol (LOPRESSOR) injection 5 mg, Q6H PRN    montelukast (SINGULAIR) tablet 10 mg, HS    morphine injection 2 mg, Q4H PRN    multivitamin-minerals (CENTRUM) tablet 1 tablet, Daily    ondansetron (ZOFRAN) injection 4 mg, Q6H PRN    oxyCODONE (ROXICODONE) IR tablet 5 mg, Q4H PRN **OR** oxyCODONE (ROXICODONE) immediate release tablet 10 mg, Q4H PRN    [COMPLETED] piperacillin-tazobactam (ZOSYN) 4.5 g in sodium chloride 0.9 % 100 mL IV LOADING DOSE, Once, Last Rate: Stopped (05/20/25 1245) **FOLLOWED BY** piperacillin-tazobactam (ZOSYN) 4.5 g in sodium chloride 0.9 % 100 mL IVPB (EXTENDED INFUSION), Q8H, Last Rate: 4.5 g (05/23/25 0647)    polyethylene glycol (MIRALAX) packet 17 g, Daily PRN    predniSONE tablet 5 mg, Daily    senna (SENOKOT) tablet 8.6 mg, Daily    Administrative Statements   Today, Patient Was Seen By: Zulema Pitt MD      **Please Note: This note may have been constructed using a voice recognition system.**

## 2025-05-23 NOTE — ASSESSMENT & PLAN NOTE
Follows with [facility] cardiology: Most recent note 4/25 was reviewed  Continue atenolol 25 mg daily.  Anticoagulation: Apixaban   Patient's beta-blocker was held yesterday morning due to borderline blood pressures and had subsequent episode of narrow complex tachycardia last evening: This appeared to be atrial tachycardia and then atrial fibrillation: Given metoprolol 5 mg IV x 1 currently in sinus rhythm  Continue beta-blocker (changed hold parameters)

## 2025-05-23 NOTE — ASSESSMENT & PLAN NOTE
Prior to admission on [facility].  No exacerbation  Albuterol metered-dose inhaler as needed

## 2025-05-23 NOTE — ASSESSMENT & PLAN NOTE
Lab Results   Component Value Date    HGBA1C 9.3 (H) 04/09/2025     Recent Labs     05/22/25  1116 05/22/25  1622 05/22/25 2029 05/23/25  0738   POCGLU 198* 360* 254* 170*     Prior to admission meds: glipizide, linagliptin and metformin  A1c 9.3.  Endocrinology consulted by vascular surgery  Oral agents on hold preoperatively  Accu-Cheks and sliding scale insulin

## 2025-05-23 NOTE — ASSESSMENT & PLAN NOTE
78 yo male ex-smoker w/ a hx of DM II (A1c 9.3), HTN, HLD, asthma, CVA (9/2024), PAF (eliquis), cardiomyopathy and PAD presented to [facility] on 5/7/25 w/ R foot pain x1 week and non-healing R 5th met wound x4-5 weeks. Pt admitted and started on ABX. Pt was transferred to [facility] on 5/11/25 for vascular surgical intervention.     Vascular surgery consulted for worsening R 5th met wound in the setting of PAD. CTA abd shows L CFA/SFA stenosis w/ focal occlusions and AT/peroneal occlusions w/ reconstitution. LEAD shows R PTA occlusion and R RIC: 0.42/15/16.    -Pt is S/P R CFA/SFA endarterectomy w/ bovine pericardial patch, DCB and stenting of SFA, and angio of AT on 5/13.   -Pt is S/P R partial 5th ray resection by podiatry on 5/16.    Diagnostics:  -5/6/25 CTA abd w/ runoff: B/L EDY/EIA/IIA patent w/ atherosclerosis. Right: Focal high-grade stenosis of distal R CFA and diffuse atherosclerotic disease of R SFA resulting in moderate to severe stenoses with focal near complete occlusions at the proximal and distal segments. Short segment occlusions of proximal R AT and peroneal arteries with reconstitution. R PTA is occluded. Left: Occluded L SFA with reconstitution. L YUNG and PTA occluded. One-vessel runoff LLE through peroneal artery.  -4/18/25 LEAD: Right: >75% prox SFA and 50-75% dSFA stenosis. Distal PTA occlusion. R RIC: 0.42/15/16. Left: PTA and YUNG occlusions. L RIC: 1.06/85/45.   -4/17/25 MRI R foot: Possible early OM in R 5th met    Plan:  -R 5th met wound (+) OM in the setting of PAD  -S/P R CFA/SFA endarterectomy w/ bovine pericardial patch, DCB and stenting of SFA, and angio of AT on 5/13 (POD #10)  -Continue daily incision care to R groin by nursing   -Continue plavix (new SFA stents) and lipitor for medical management of PAD   -Continue eliquis for hx of afib  -Podiatry following, S/P R partial 5th ray resection on 5/16  -Podiatry plan for R partial 4th ray amputation and wound debridment in OR today  (5/23)  -Intra-op cx from 5/16 (+) for serratia, enterococcus faecalis and pseudomonas   -ID following for ABX management; input appreciated  -Cardiology following for CHF management; input appreciated   -Continue medical management per primary team  -Case management following w/ plans for STR  -Pt is stable for discharge from a vascular surgery standpoint  -Will continue to follow peripherally while pt remains hospitalized  -Plan follow up outpt in vascular surgery office; appt scheduled for 6/5/25  -Will discuss w/ Dr. Vazquez

## 2025-05-23 NOTE — PROGRESS NOTES
Progress Note - Vascular Surgery   Name: Gold Van 79 y.o. 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 Pt admitted and started on ABX. Pt was transferred to [facility] on 5/11/25 for vascular surgical intervention.     Vascular surgery consulted for worsening R 5th met wound in the setting of PAD. CTA abd shows L CFA/SFA stenosis w/ focal occlusions and AT/peroneal occlusions w/ reconstitution. LEAD shows R PTA occlusion and R RIC: 0.42/15/16.    -Pt is S/P R CFA/SFA endarterectomy w/ bovine pericardial patch, DCB and stenting of SFA, and angio of AT on 5/13.   -Pt is S/P R partial 5th ray resection by podiatry on 5/16.    Diagnostics:  -5/6/25 CTA abd w/ runoff: B/L EDY/EIA/IIA patent w/ atherosclerosis. Right: Focal high-grade stenosis of distal R CFA and diffuse atherosclerotic disease of R SFA resulting in moderate to severe stenoses with focal near complete occlusions at the proximal and distal segments. Short segment occlusions of proximal R AT and peroneal arteries with reconstitution. R PTA is occluded. Left: Occluded L SFA with reconstitution. L YUNG and PTA occluded. One-vessel runoff LLE through peroneal artery.  -4/18/25 LEAD: Right: >75% prox SFA and 50-75% dSFA stenosis. Distal PTA occlusion. R RIC: 0.42/15/16. Left: PTA and YUNG occlusions. L RIC: 1.06/85/45.   -4/17/25 MRI R foot: Possible early OM in R 5th met    Plan:  -R 5th met wound (+) OM in the setting of PAD  -S/P R CFA/SFA endarterectomy w/ bovine pericardial patch, DCB and stenting of SFA, and angio of AT on 5/13 (POD  #10)  -Continue daily incision care to R groin by nursing   -Continue plavix (new SFA stents) and lipitor for medical management of PAD   -Continue eliquis for hx of afib  -Podiatry following, S/P R partial 5th ray resection on 5/16  -Podiatry plan for R partial 4th ray amputation and wound debridment in OR today (5/23)  -Intra-op cx from 5/16 (+) for serratia, enterococcus faecalis and pseudomonas   -ID following for ABX management; input appreciated  -Cardiology following for CHF management; input appreciated   -Continue medical management per primary team  -Case management following w/ plans for STR  -Pt is stable for discharge from a vascular surgery standpoint  -Will continue to follow peripherally while pt remains hospitalized  -Plan follow up outpt in vascular surgery office; appt scheduled for 6/5/25  -Will discuss w/ Dr. Vazquez    Subjective : Pt seen for exam while lying in bed; NAD. Pt denies any complaints at this time.    Objective :  Temp:  [97.8 °F (36.6 °C)-98.7 °F (37.1 °C)] 98.1 °F (36.7 °C)  HR:  [72-83] 83  BP: (106-138)/(51-64) 138/64  Resp:  [16-18] 16  SpO2:  [92 %-95 %] 95 %  O2 Device: None (Room air)     I/O         05/21 0701  05/22 0700 05/22 0701  05/23 0700 05/23 0701  05/24 0700    P.O. 200 750     I.V. (mL/kg) 10 (0.1)      Total Intake(mL/kg) 210 (2.5) 750 (9)     Urine (mL/kg/hr) 600 (0.3) 675 (0.3)     Stool  0     Total Output 600 675     Net -390 +75            Unmeasured Stool Occurrence  2 x             Physical Exam  Vitals and nursing note reviewed.   Constitutional:       General: He is awake. He is not in acute distress.     Appearance: Normal appearance. He is well-developed.   HENT:      Head: Normocephalic and atraumatic.     Cardiovascular:      Rate and Rhythm: Normal rate and regular rhythm.      Pulses:           Dorsalis pedis pulses are detected w/ Doppler on the right side and detected w/ Doppler on the left side.        Posterior tibial pulses are detected w/  Doppler on the right side and detected w/ Doppler on the left side.      Heart sounds: Normal heart sounds.      Comments: Trace R pedal edema. Multiphasic R DP/PT signals.  Pulmonary:      Effort: Pulmonary effort is normal. No respiratory distress.   Abdominal:      General: There is no distension.      Palpations: Abdomen is soft.     Musculoskeletal:         General: No swelling.      Cervical back: Neck supple.      Right lower leg: Edema present.     Skin:     General: Skin is warm and dry.      Capillary Refill: Capillary refill takes less than 2 seconds.      Findings: Wound present.      Comments: R 5th met surgical site. R groin incision.     Neurological:      General: No focal deficit present.      Mental Status: He is alert and oriented to person, place, and time. Mental status is at baseline.     Psychiatric:         Mood and Affect: Mood normal.         Speech: Speech normal.         Behavior: Behavior normal. Behavior is cooperative.         Thought Content: Thought content normal.         Judgment: Judgment normal.       Wound/Incision:  R 5th metatarsal surgical wound w/ dressing clean, dry and intact. R groin incision site soft, well-approx, no swelling, ecchymosis, erythema or drainage, staples intact.       Lab Results: I have reviewed the following results:  CBC with diff:   Lab Results   Component Value Date    WBC 7.13 05/23/2025    HGB 8.8 (L) 05/23/2025    HCT 27.7 (L) 05/23/2025    MCV 91 05/23/2025     (H) 05/23/2025    RBC 3.03 (L) 05/23/2025    MCH 29.0 05/23/2025    MCHC 31.8 05/23/2025    RDW 16.1 (H) 05/23/2025    MPV 8.8 (L) 05/23/2025    NRBC 0 05/23/2025     BMP/CMP:  Lab Results   Component Value Date    SODIUM 134 (L) 05/23/2025    K 4.2 05/23/2025    K 4.5 04/14/2015     05/23/2025     04/14/2015    CO2 24 05/23/2025    CO2 22 05/13/2025    ANIONGAP 8 04/14/2015    BUN 11 05/23/2025    BUN 10 04/14/2015    CREATININE 0.94 05/23/2025    CREATININE 0.86  04/14/2015    GLUCOSE 262 (H) 05/13/2025    GLUCOSE 179 (H) 04/14/2015    CALCIUM 7.8 (L) 05/23/2025    CALCIUM 8.9 04/14/2015    AST 12 (L) 05/16/2025    AST 12 08/12/2014    ALT 4 (L) 05/16/2025    ALT 19 08/12/2014    ALKPHOS 51 05/16/2025    ALKPHOS 101 08/12/2014    PROT 6.8 08/12/2014    BILITOT 0.6 08/12/2014    EGFR 76 05/23/2025     Coags:   Lab Results   Component Value Date    PTT 53 (H) 05/15/2025    INR 1.14 05/07/2025     Blood Culture:   Lab Results   Component Value Date    BLOODCX No Growth After 5 Days. 05/07/2025    BLOODCX No Growth After 5 Days. 05/07/2025     Urinalysis:   Lab Results   Component Value Date    COLORU Yellow 05/07/2025    COLORU Yellow 08/12/2014    CLARITYU Clear 05/07/2025    CLARITYU Clear 08/12/2014    SPECGRAV 1.010 05/07/2025    SPECGRAV 1.025 08/12/2014    PHUR 6.0 05/07/2025    PHUR 6.0 06/14/2016    PHUR 6.0 08/12/2014    LEUKOCYTESUR Negative 05/07/2025    LEUKOCYTESUR Negative 08/12/2014    NITRITE Negative 05/07/2025    NITRITE Negative 08/12/2014    PROTEINUA Negative 08/12/2014    GLUCOSEU Negative 05/07/2025    GLUCOSEU 100 (1/10%) (A) 08/12/2014    KETONESU Negative 05/07/2025    KETONESU Negative 08/12/2014    BILIRUBINUR Negative 05/07/2025    BILIRUBINUR Negative 08/12/2014    BLOODU 1+ (A) 05/07/2025    BLOODU Trace (A) 08/12/2014     Wound Culure:   Lab Results   Component Value Date    WOUNDCULT 3+ Growth of Serratia marcescens (A) 05/16/2025    WOUNDCULT 3+ Growth of Enterococcus faecalis (A) 05/16/2025    WOUNDCULT 1+ Growth of Pseudomonas aeruginosa (A) 05/16/2025

## 2025-05-23 NOTE — ASSESSMENT & PLAN NOTE
Patient is a 79-year-old male with past medical history significant for DM2, PAF, asthma, and PAD who presented with right foot wound  Met sepsis criteria at [facility] initially  Diagnosed with right fifth metatarsal osteomyelitis with right foot cellulitis, started on abx and transferred here for vascular surgery  S/p vascular surgery and right fifth ray amputation 5/16  intraoperative cultures: Serratia/Enterococcus and pseudomonas  Blood culture negative x 2  Appreciate evaluation and recommendations from infectious disease team: Continue antibiotics with Zosyn  Anticipate return to the OR today (was postponed yesterday)  Per ID: If surgical cure anticipated, recommend 5-7 days postop antibiotic

## 2025-05-23 NOTE — PROGRESS NOTES
Podiatry - Progress Note  Patient: Gold Van 79 y.o. male   MRN: [id number]  PCP: Shayne Diaz MD  Unit/Bed#: [address] [address] Encounter: [id number]  Date Of Visit: 25    ASSESSMENT:    oGld Van is a 79 y.o. male with:    Right 5th metatarsal ulceration with underlying OM ([place] 4)  - s/p right fifth ray resection (DOS: 25)  Right foot cellulitis  PAD  - s/p right femoral endarterectomy and antegrade endovascular intervention (DOS: 25)  Type 2 diabetes mellitus A1c: 9.3% (25)      PLAN:    Patient to go to OR today,25, for right partial 4th ray amputation and wound debridement with Dr. Phillips.  Informed consent obtained.   Patient is cleared by both SLIM and cardiology for surgical intervention. Of note, patient to undergo surgery during daytime hours as cardiology is in house at that time should that be necessary.   Confirmed NPO status.  H&P, vitals, and current labs reviewed.  No acute changes noted.  Alternatives, risks, and complications discussed with patient.  All questions answered.  No guarantees given of outcome.  Rest of medical care per primary team.       SUBJECTIVE:     The patient was seen, evaluated, and assessed at bedside today. The patient was awake, alert, and in no acute distress. Patient confirmed NPO status. All questions and concerns regarding the surgical procedure addressed. Patient understands risks vs benefits of procedure and remains amenable with plan for surgery today. Patient denies N/V/F/chills/SOB/CP.      OBJECTIVE:     Vitals:   /64   Pulse 83   Temp 98.1 °F (36.7 °C)   Resp 16   Ht 6' (1.829 m)   Wt 83 kg (182 lb 15.7 oz)   SpO2 95%   BMI 24.82 kg/m²     Temp (24hrs), Av.3 °F (36.8 °C), Min:97.8 °F (36.6 °C), Max:98.7 °F (37.1 °C)        Physical Exam:     General:  Alert, cooperative, and in no distress.  Lower extremity exam:  Cardiovascular status at baseline.  Neurological status at baseline.  Musculoskeletal  "status at baseline. No calf tenderness noted bilaterally. Dressing left intact to the Operating Room.     Additional Data:     Labs:    Results from last 7 days   Lab Units 05/23/25  0503   WBC Thousand/uL 7.13   HEMOGLOBIN g/dL 8.8*   HEMATOCRIT % 27.7*   PLATELETS Thousands/uL 570*   SEGS PCT % 61   LYMPHO PCT % 22   MONO PCT % 11   EOS PCT % 3     Results from last 7 days   Lab Units 05/23/25  0503   POTASSIUM mmol/L 4.2   CHLORIDE mmol/L 104   CO2 mmol/L 24   BUN mg/dL 11   CREATININE mg/dL 0.94   CALCIUM mg/dL 7.8*           * I Have Reviewed All Lab Data Listed Above.    Recent Cultures (last 7 days):     Results from last 7 days   Lab Units 05/16/25  1337   GRAM STAIN RESULT  Rare Gram positive cocci in pairs*  No polys seen*   WOUND CULTURE  3+ Growth of Serratia marcescens*  3+ Growth of Enterococcus faecalis*  1+ Growth of Pseudomonas aeruginosa*     Results from last 7 days   Lab Units 05/16/25  1337   ANAEROBIC CULTURE  No anaerobes isolated       Imaging: I have personally reviewed pertinent films in PACS  EKG, Pathology, and Other Studies: I have personally reviewed pertinent reports.    ** Please Note: Portions of the record may have been created with voice recognition software. Occasional wrong word or \"sound a like\" substitutions may have occurred due to the inherent limitations of voice recognition software. Read the chart carefully and recognize, using context, where substitutions have occurred. **      "

## 2025-05-23 NOTE — PLAN OF CARE
Problem: PAIN - ADULT  Goal: Verbalizes/displays adequate comfort level or baseline comfort level  Description: Interventions:- Encourage patient to monitor pain and request assistance- Assess pain using appropriate pain scale- Administer analgesics based on type and severity of pain and evaluate response- Implement non-pharmacological measures as appropriate and evaluate response- Consider cultural and social influences on pain and pain management- Notify physician/advanced practitioner if interventions unsuccessful or patient reports new pain  Outcome: Progressing     Problem: DISCHARGE PLANNING  Goal: Discharge to home or other facility with appropriate resources  Description: INTERVENTIONS:- Identify barriers to discharge w/patient and caregiver- Arrange for needed discharge resources and transportation as appropriate- Identify discharge learning needs (meds, wound care, etc.)- Arrange for interpretive services to assist at discharge as needed- Refer to Case Management Department for coordinating discharge planning if the patient needs post-hospital services based on physician/advanced practitioner order or complex needs related to functional status, cognitive ability, or social support system  Outcome: Progressing     Problem: INFECTION - ADULT  Goal: Absence or prevention of progression during hospitalization  Description: INTERVENTIONS:  - Assess and monitor for signs and symptoms of infection  - Monitor lab/diagnostic results  - Monitor all insertion sites, i.e. indwelling lines, tubes, and drains  - Monitor endotracheal if appropriate and nasal secretions for changes in amount and color  - [place] appropriate cooling/warming therapies per order  - Administer medications as ordered  - Instruct and encourage patient and family to use good hand hygiene technique  - Identify and instruct in appropriate isolation precautions for identified infection/condition  Outcome: Progressing  Goal: Absence of  fever/infection during neutropenic period  Description: INTERVENTIONS:  - Monitor WBC    Outcome: Progressing     Problem: SAFETY ADULT  Goal: Patient will remain free of falls  Description: INTERVENTIONS:  - Educate patient/family on patient safety including physical limitations  - Instruct patient to call for assistance with activity   - Consult OT/PT to assist with strengthening/mobility   - Keep Call bell within reach  - Keep bed low and locked with side rails adjusted as appropriate  - Keep care items and personal belongings within reach  - Initiate and maintain comfort rounds  - Make Fall Risk Sign visible to staff  - Offer Toileting every 2 Hours, in advance of need  - Initiate/Maintain bed alarm  - Obtain necessary fall risk management equipment:   - Apply yellow socks and bracelet for high fall risk patients  - Consider moving patient to room near nurses station  Outcome: Progressing  Goal: Maintain or return to baseline ADL function  Description: INTERVENTIONS:  -  Assess patient's ability to carry out ADLs; assess patient's baseline for ADL function and identify physical deficits which impact ability to perform ADLs (bathing, care of mouth/teeth, toileting, grooming, dressing, etc.)  - Assess/evaluate cause of self-care deficits   - Assess range of motion  - Assess patient's mobility; develop plan if impaired  - Assess patient's need for assistive devices and provide as appropriate  - Encourage maximum independence but intervene and supervise when necessary  - Involve family in performance of ADLs  - Assess for home care needs following discharge   - Consider OT consult to assist with ADL evaluation and planning for discharge  - Provide patient education as appropriate  Outcome: Progressing  Goal: Maintains/Returns to pre admission functional level  Description: INTERVENTIONS:  - Perform AM-PAC 6 Click Basic Mobility/ Daily Activity assessment daily.  - Set and communicate daily mobility goal to care team  and patient/family/caregiver.   - Collaborate with rehabilitation services on mobility goals if consulted  - Perform Range of Motion 4 times a day.  - Reposition patient every 3 hours.  - Dangle patient 3 times a day  - Stand patient 3 times a day  - Ambulate patient 3 times a day  - Out of bed to chair 3 times a day   - Out of bed for meals 3 times a day  - Out of bed for toileting  - Record patient progress and toleration of activity level   Outcome: Progressing     Problem: Knowledge Deficit  Goal: Patient/family/caregiver demonstrates understanding of disease process, treatment plan, medications, and discharge instructions  Description: Complete learning assessment and assess knowledge base.  Interventions:  - Provide teaching at level of understanding  - Provide teaching via preferred learning methods  Outcome: Progressing     Problem: SKIN/TISSUE INTEGRITY - ADULT  Goal: Skin Integrity remains intact(Skin Breakdown Prevention)  Description: Assess:-Perform Sae assessment -Clean and moisturize skin -Inspect skin when repositioning, toileting, and assisting with ADLS-Assess under medical devices -Assess extremities for adequate circulation and sensation Bed Management:-Have minimal linens on bed & keep smooth, unwrinkled-Change linens as needed when moist or perspiring-Avoid sitting or lying in one position for more than 2 hours while in bed-Keep HOB at 30 degrees Toileting:-Offer bedside commode-Assess for incontinence -Use incontinent care products after each incontinent episode Activity:-Mobilize patient 3 times a day-Encourage activity and walks on unit-Encourage or provide ROM exercises -Turn and reposition patient every 2 Hours-Use appropriate equipment to lift or move patient in bed-Instruct/ Assist with weight shifting  when out of bed in chair-Consider limitation of chair time 2 hour intervalsSkin Care:-Avoid use of baby powder, tape, friction and shearing, hot water or constrictive clothing-Relieve  pressure over bony prominences Do not massage red bony areasNext Steps:-Teach patient strategies to minimize risks Consider consults to  interdisciplinary teams   Outcome: Progressing  Goal: Incision(s), wounds(s) or drain site(s) healing without S/S of infection  Description: INTERVENTIONS- Assess and document dressing, incision, wound bed, drain sites and surrounding tissue- Provide patient and family education- Perform skin care/dressing changes every shift  Outcome: Progressing  Goal: Pressure injury heals and does not worsen  Description: Interventions:- Implement low air loss mattress or specialty surface (Criteria met)- Apply silicone foam dressing- Instruct/assist with weight shifting every 120 minutes when in chair - Limit chair time to 2 hour intervals- Use special pressure reducing interventions such as turning when in chair - Apply fecal or urinary incontinence containment device - Perform passive or active ROM every 2 hours- Turn and reposition patient & offload bony prominences every 2 hours - Utilize friction reducing device or surface for transfers - Consider consults to  interdisciplinary teams such as wound- Use incontinent care products after each incontinent episode such as wipes- Consider nutrition services referral as needed  Outcome: Progressing     Problem: METABOLIC, FLUID AND ELECTROLYTES - ADULT  Goal: Electrolytes maintained within normal limits  Description: INTERVENTIONS:  - Monitor labs and assess patient for signs and symptoms of electrolyte imbalances  - Administer electrolyte replacement as ordered  - Monitor response to electrolyte replacements, including repeat lab results as appropriate  - Instruct patient on fluid and nutrition as appropriate  Outcome: Progressing     Problem: HEMATOLOGIC - ADULT  Goal: Maintains hematologic stability  Description: INTERVENTIONS  - Assess for signs and symptoms of bleeding or hemorrhage  - Monitor labs  - Administer supportive blood  products/factors as ordered and appropriate  Outcome: Progressing     Problem: CARDIOVASCULAR - ADULT  Goal: Maintains optimal cardiac output and hemodynamic stability  Description: INTERVENTIONS:  - Monitor I/O, vital signs and rhythm  - Monitor for S/S and trends of decreased cardiac output  - Administer and titrate ordered vasoactive medications to optimize hemodynamic stability  - Assess quality of pulses, skin color and temperature  - Assess for signs of decreased coronary artery perfusion  - Instruct patient to report change in severity of symptoms  Outcome: Progressing  Goal: Absence of cardiac dysrhythmias or at baseline rhythm  Description: INTERVENTIONS:  - Continuous cardiac monitoring, vital signs, obtain 12 lead EKG if ordered  - Administer antiarrhythmic and heart rate control medications as ordered  - Monitor electrolytes and administer replacement therapy as ordered  Outcome: Progressing     Problem: Nutrition/Hydration-ADULT  Goal: Nutrient/Hydration intake appropriate for improving, restoring or maintaining nutritional needs  Description: Monitor and assess patient's nutrition/hydration status for malnutrition. Collaborate with interdisciplinary team and initiate plan and interventions as ordered.  Monitor patient's weight and dietary intake as ordered or per policy. Utilize nutrition screening tool and intervene as necessary. Determine patient's food preferences and provide high-protein, high-caloric foods as appropriate. INTERVENTIONS:- Monitor oral intake, urinary output, labs, and treatment plans- Assess nutrition and hydration status and recommend course of action- Evaluate amount of meals eaten- Assist patient with eating if necessary - Allow adequate time for meals- Recommend/ encourage appropriate diets, oral nutritional supplements, and vitamin/mineral supplements- Order, calculate, and assess calorie counts as needed- Recommend, monitor, and adjust tube feedings and TPN/PPN based on  assessed needs- Assess need for intravenous fluids- Provide specific nutrition/hydration education as appropriate- Include patient/family/caregiver in decisions related to nutrition  Outcome: Progressing     Problem: Prexisting or High Potential for Compromised Skin Integrity  Goal: Skin integrity is maintained or improved  Description: INTERVENTIONS:  - Identify patients at risk for skin breakdown  - Assess and monitor skin integrity  - Assess and monitor nutrition and hydration status  - Monitor labs   - Assess for incontinence   - Turn and reposition patient  - Assist with mobility/ambulation  - Relieve pressure over bony prominences  - Avoid friction and shearing  - Provide appropriate hygiene as needed including keeping skin clean and dry  - Evaluate need for skin moisturizer/barrier cream  - Collaborate with interdisciplinary team   - Patient/family teaching  - Consider wound care consult   Outcome: Progressing

## 2025-05-23 NOTE — ASSESSMENT & PLAN NOTE
Patient is a 79-year-old male with past medical history significant for DM2, PAF, asthma, and PAD who presented with right foot wound  Met sepsis criteria at [facility] initially  Diagnosed with right fifth metatarsal osteomyelitis with right foot cellulitis, started on abx and transferred here for vascular surgery  S/p vascular surgery and right fifth ray amputation 5/16  intraoperative cultures: Serratia/Enterococcus and pseudomonas  Blood culture negative x 2  Appreciate evaluation and recommendations from infectious disease team: Continue antibiotics with Zosyn  Anticipate return to the OR today (was postponed yesterday)  Per ID: If surgical cure anticipated, recommend 5-7 days postop antibiotic

## 2025-05-23 NOTE — ASSESSMENT & PLAN NOTE
Prior baseline hemoglobin appears to range 10-12  Postprocedure hemoglobin decreased to 7.0 when patient was symptomatic: Secondary to expected procedural blood loss  On 5/18 transfused 1 unit PRBC   Repeat hemoglobin 9's  Hemoglobin stable: Monitor closely postprocedure    Results from last 7 days   Lab Units 05/23/25  0503 05/22/25  0529 05/21/25  0458   HEMOGLOBIN g/dL 8.8* 9.0* 9.1*   MCV fL 91 92 92

## 2025-05-23 NOTE — OP NOTE
OPERATIVE REPORT - Podiatry  PATIENT NAME: Gold Van    :  1945  MRN: [id number]  Pt Location: [facility] OR ROOM 04    SURGERY DATE: 2025    Surgeons and Role:     * Brandon Phillips DPM - Primary     * Radha Arce DPM - Assisting    Pre-op Diagnosis:  Ulcer of right foot with fat layer exposed (HCC) [L97.512]    Post-Op Diagnosis Codes:     * Ulcer of right foot with fat layer exposed (HCC) [L97.512]    Procedure(s) (LRB):  Right partial 4th ray amputation, wound debridement (Right)    Specimen(s):  ID Type Source Tests Collected by Time Destination   1 : Right 4th Ray Resection Tissue Bone TISSUE EXAM Brandon Phillips DPM 2025 [id number]        Estimated Blood Loss:   Minimal    Drains:  * No LDAs found *    Anesthesia Type:   Sedation with 10 ml of 1% Lidocaine and 0.5% Bupivacaine in a 1:1 mixture  10 ml of 1% lidocaine intra op    Hemostasis:  Electrocautery  Surgicell  Manual compression    Materials:  * No implants in log *      Operative Findings:  After partial 4th ray resection and further resection of 5th metatarsal stump, both bones were of hard, viable quality with bleeding. No areas of sinus tracts or purulence encountered.     Complications:   None    Procedure and Technique:     Under mild sedation, the patient was brought into the operating room. IV sedation was achieved by anesthesia team and a universal timeout was performed where all parties are in agreement of correct patient, correct procedure and correct site. A local block was performed consisting of 10 ml of 1% Lidocaine and 0.5% Bupivacaine in a 1:1 mixture. The foot was then prepped and draped in the usual aseptic manner.    Attention directed to right foot pre-existing wound located at lateral forefoot. Skin incision was made extending distal from this wound to encircle the 4th MTPJ. Further sharp dissection around the 4th metatarsal was performed and periosteum was stripped with key elevator. Sagittal saw used to  "make bone cut across 4th metatarsal as well as the 5th metatarsal stump. The cuts were oriented dorsal distal to plantar proximal, maintaining an appropriate parabola. Partial 4th ray as well as the bone cut of the 5th metatarsal were passed off the field. The partial 4th ray was sent for pathological examination.     The wound was then debrided with 15 blade and ronguer of all nonviable, devitalized, necrotic and fibrous tissue w/ excision of skin, fascia, subcutaneous tissue, and tendon. Copious irrigation with nss in pulse lavage. The wound measured 10x4x3.5cm, with appearance of wound fresh bleeding tissue. Hemostasis was achieved using electrocautery and surgicell. Packing strips placed into wound. Dressed with 4x4 gauze, ABD pad, kerlix, ACE bandage.     The patient tolerated the procedure and anesthesia well without immediate complications and transferred to PACU with vital signs stable.     As with many limb salvage procedures, we contemplate the possibility of performing further stages to this procedure. Procedures may include debridements, delayed closure, plastic surgery techniques, or more proximal amputations. This procedure may be considered part of a multi-staged limb salvage treatment plan.     Dr. Phillips was present during the entire procedure and participated in all key aspects.    SIGNATURE: Radha Arce DPM  DATE: May 23, 2025  TIME: 3:05 PM      Portions of the record may have been created with voice recognition software. Occasional wrong word or \"sound a like\" substitutions may have occurred due to the inherent limitations of voice recognition software. Read the chart carefully and recognize, using context, where substitutions have occurred.    "

## 2025-05-23 NOTE — PROGRESS NOTES
Progress Note - Cardiology   Name: Gold Van 79 y.o. male I MRN: [id number]  Unit/Bed#: [place] [address] -01 I Date of Admission: 5/11/2025   Date of Service: 5/23/2025 I Hospital Day: 12      Date of initial consultation: 5/19/2025    Diagnoses:  Acute pulmonary edema and heart failure exacerbation, HFpEF  Osteomyelitis of the foot, POD 0 s/p left foot right partial fourth ray amputation along with wound debridement  Severe occlusive PAD, /P R CFA/SFA endarterectomy w/ bovine pericardial patch, DCB and stenting of SFA, and angio of AT on 5/13/25  Subclinical coronary artery disease with extensive coronary artery calcification  Hypertension  Diabetes mellitus  Paroxysmal atrial fibrillation and paroxysmal supraventricular tachycardia  Postoperative anemia.  Secondary to blood loss  Mild intermittent asthma anemia    Assessment & Plan  Chronic heart failure with preserved ejection fraction (HFpEF) ([facility])    TODAY'S (05/21/25) ASSESSMENT   HPI / Last 24 hours No acute events.  Patient underwent podiatric surgery with right partial fourth ray amputation along with wound debridement.  Tolerated the procedure well.  EBL was minimal..   Subjective Currently patient feels well.  Denies chest pain shortness of breath or dizziness or palpitations.   VITALS highlights Heart rate currently in 60s-70s; blood pressure  120s-130s/ 60s-70s 98-99 % on room air   EXAM highlights Lean build, conjunctival pallor, no JVD, no carotid bruit, regular rate rhythm, normal intensity heart sounds, decreased breath sounds at bases without any crackles, surgical dressing in right lower extremity, ganglion cyst in the right wrist region, bruising and subcutaneous bleed in right arm surgical dressing in right lower extremity.   LABS highlights Postoperative labs: Hemoglobin 9.7, hematocrit 29.8; high sensitive troponin 100    Preoperative labs today sodium 134 potassium 4.2 chloride 104 bicarb 24 BUN 11 creatinine 0.98 GFR 76 magnesium 2.0  glucose 182 calcium 7.8  132 potassium 4.2 chloride 100 bicarb 24 BUN 18 creatinine 1.06 GFR 66  High-sensitivity troponins 163 -- 155 -- 193 -- 237.  -- 159 yesterday   on 5/19/2025   ECHO and other studies TTE 5/19/2025: EF 50%, low normal, decrease GLS -11%, grade 1 diastolic dysfunction, hypokinesis of mid anteroseptal and apical septal walls; normal RV size and function; mild LA enlargement and normal RA size, prominent eustachian valve or cor triatriatum, lipomatous hypertrophy of interatrial septum, mildly thickened AV with mild AI, thickened MV with mild MAC and MV calcification, mild MR, trace TR, no obvious pulmonary hypertension, no pericardial effusion.    Cardiac SPECT 1/9/2025: Normal perfusion, EF 65%.    TTE 10/01/24: LVEF 50%, mild hypokinesia of the posterior wall, grade I DD, mild AR     CXR 5/19/2025: Moderate interstitial edema, normal cardiomediastinal silhouette significant pleural effusion   Telemetry & ECG ECG performed after surgery: Sinus rhythm with inferolateral ST-T wave abnormalities slightly better compared to  Currently not on telemetry.  ECG 5/22/2025 lateral ST changes suggestive of ischemia similar to previous ECG from 5/19/2025.   Current GDMT Apixaban 5 mg twice daily + atenolol 25 mg daily + atorvastatin 40 mg daily + amlodipine 5 mg twice daily + clopidogrel 75 mg daily + furosemide 40 mg IV twice daily + lisinopril 20 mg p.o. twice daily     Patient seen in the immediate postoperative state.  Tolerated surgery well without acute decompensation.  Gives no symptoms suggestive of angina or decompensated heart failure.  Hemoglobin and hematocrit noted to be stable and elevated compared to before.  Clinically does not appear to be failure.      Continue current cardiac regiment.  Follow-up on basic metabolic panel and hemoglobin and hematocrit in AM.  Will keep hemoglobin greater than 9.  Continued close monitoring for signs of coronary ischemia and congestive heart  failure.  Sepsis without acute organ dysfunction (HCC)  Ulcer of right foot with fat layer exposed secondary to osteomyelitis  Severe peripheral arterial disease (HCC)  - met SIRS criteria at [facility] with 05/07/25 with tachycardia and leukocytosis   - 5/16: Underwent right partial fifth ray resection: presumed surgical cure  - s/p right CFA SFA endarterectomy/stenting 5/13/2025   - currently on Plavix 75 mg daily (podiatry and vascular following)  Essential hypertension  - BP overall stable during admission   - home rx: amlodipine 5 mg daily, lisinopril 20 mg daily, atenolol 25 mg daily   PAF (paroxysmal atrial fibrillation) ([facility])  - o 01/06/25: predominant NSR with 1 NSVT lasting 5 beats, 2% AF/flutter burden  - rate control: atenolol 25 mg daily   - AC: Eliquis 5 mg BID  Troponin I above reference range  - troponin trend: 163 > 155 > 193 > 237  - EKG shows nonspecific ST and T wave abnormalities   Type 2 diabetes mellitus with complication, without long-term current use of insulin ([facility])  Lab Results   Component Value Date    HGBA1C 9.3 (H) 04/09/2025   Continue  Postoperative anemia  - baseline ~ 10-12 with drop to 7 after surgery   - s/p 1 unit of PRBCs 05/18/25   Mild intermittent asthma without complication      Subjective   Chief Complaint: Noted above in HPI    Objective :  Temp:  [97.4 °F (36.3 °C)-98.7 °F (37.1 °C)] 97.5 °F (36.4 °C)  HR:  [68-83] 70  BP: (119-138)/(60-72) 120/70  Resp:  [16-18] 16  SpO2:  [94 %-100 %] 98 %  O2 Device: None (Room air)  Nasal Cannula O2 Flow Rate (L/min):  [2 L/min] 2 L/min  Orthostatic Blood Pressures      Flowsheet Row Most Recent Value   Blood Pressure 120/70 filed at 05/23/2025 1546   Patient Position - Orthostatic VS Lying filed at 05/22/2025 2111          First Weight: Weight - Scale: 72.4 kg (159 lb 11.2 oz) (05/12/25 0553)  Vitals:    05/22/25 0600 05/23/25 0540   Weight: 84.8 kg (186 lb 15.2 oz) 83 kg (182 lb 15.7 oz)     Physical Exam  Constitutional:        Appearance: He is normal weight.   Neck:      Vascular: No carotid bruit.     Cardiovascular:      Rate and Rhythm: Normal rate and regular rhythm.      Heart sounds: No murmur heard.  Pulmonary:      Effort: Pulmonary effort is normal. No respiratory distress.      Breath sounds: Normal breath sounds. No wheezing or rales.   Abdominal:      Palpations: Abdomen is soft.     Musculoskeletal:      Cervical back: Neck supple.      Right lower leg: No edema.      Left lower leg: No edema.     Skin:     General: Skin is warm and dry.      Capillary Refill: Capillary refill takes less than 2 seconds.      Findings: Lesion present.      Comments: Surgical dressing in right lower extremity.  Bruising and ecchymosis in upper extremity.     Neurological:      Mental Status: He is alert and oriented to person, place, and time. Mental status is at baseline.     Psychiatric:         Mood and Affect: Mood normal.         Behavior: Behavior normal.         Lab Results: I have reviewed the following results:CBC/BMP:   .     05/23/25  0503 05/23/25  1602   WBC 7.13  --    HGB 8.8* 9.7*   HCT 27.7* 29.8*   *  --    SODIUM 134*  --    K 4.2  --      --    CO2 24  --    BUN 11  --    CREATININE 0.94  --    GLUC 182*  --    MG 2.0  --       Results from last 7 days   Lab Units 05/23/25  1602 05/23/25  0503 05/22/25  0529 05/21/25  0458   WBC Thousand/uL  --  7.13 7.61 10.65*   HEMOGLOBIN g/dL 9.7* 8.8* 9.0* 9.1*   HEMATOCRIT % 29.8* 27.7* 27.9* 28.4*   PLATELETS Thousands/uL  --  570* 568* 532*     Results from last 7 days   Lab Units 05/23/25  0503 05/22/25  0529 05/21/25 2018   POTASSIUM mmol/L 4.2 3.9 4.2   CHLORIDE mmol/L 104 102 100   CO2 mmol/L 24 26 24   BUN mg/dL 11 14 18   CREATININE mg/dL 0.94 0.89 1.06   CALCIUM mg/dL 7.8* 8.0* 7.7*           Lab Results   Component Value Date    HGBA1C 9.3 (H) 04/09/2025     Lab Results   Component Value Date    CKTOTAL 292 09/26/2024    TROPONINI <0.03 03/24/2021        Imaging Results Review: I reviewed radiology reports from this admission including: chest xray and Echocardiogram.  Other Study Results Review: EKG was reviewed.     VTE Pharmacologic Prophylaxis: VTE covered by:  apixaban, Oral, 5 mg at 05/23/25 0832

## 2025-05-23 NOTE — QUICK NOTE
Post procedure Hb, trop and EKG reviewed.    EKG with inf lat TWI:  stable, improved from prior  Cont close monitoring

## 2025-05-23 NOTE — ASSESSMENT & PLAN NOTE
Prior to admission on [facility].  No exacerbation  Albuterol metered-dose inhaler as needed

## 2025-05-23 NOTE — ASSESSMENT & PLAN NOTE
- met SIRS criteria at [facility] with 05/07/25 with tachycardia and leukocytosis   - 5/16: Underwent right partial fifth ray resection: presumed surgical cure  - s/p right CFA SFA endarterectomy/stenting 5/13/2025   - currently on Plavix 75 mg daily (podiatry and vascular following)

## 2025-05-23 NOTE — WOUND OSTOMY CARE
Progress Note - Wound   Gold Van 79 y.o. male MRN: [id number]  Unit/Bed#: [place] [address] -01 Encounter: [id number]        Assessment:   Patient seen for wound care follow-up.  He is agreeable to assessment.  Able to turn in bed independently.  Continent of bowel and bladder.  Nutrition team following, dietary supplements ordered.  Left heel intact and blanchable.  Scattered ecchymosis to arms.    Dressing dry and intact to right foot per podiatry.  Right anterior thigh/groin incision per vascular surgery team.  Left arm skin tear--RESOLVED  Left pretibial skin tear-- pink, partial thickness with small serosanguinous drainage.  Miranda-wound intact.  Irritant contact dermatitis to sacrum secondary to perspiration and friction-- pink, intact, blanchable with tan scaling.  Miranda-wound intact.  Maceration has resolved.    Diabetic ulcer to right heel-- unable to assess right heel wound due to podiatry dressing in place covering area.    See flowsheet for wound details.     Wound Care Plan:   1-Silicone Cream/Hydraguard lotion to left heel twice daily and as needed.  2-Elevate heels off of bed/chair surface--offloading heel boots  3-Offloading air cushion in chair when out of bed.  4-Apply moisturizing skin cream to body daily and as needed.  5-Turn/reposition every 2 hours for pressure re-distribution on skin.   6-Accumax pump to bed mattress.  7-Right foot per podiatry team recommendations.  8-Right anterior thigh/groin per vascular team recommendations.  9-Apply silicone bordered foam dressing to sacrum for prevention.  Maged with P.  Peel back at least daily for skin assessment and re-apply.  Change dressing every other day and as needed.  10-Left pretibial skin tears--cleanse with Vashe soak, remove, pat dry.  Apply Dermagran and cover with silicone bordered foam dressing.  Change dressing every other day and as needed.     Wound care team to follow.  Plan of care reviewed with primary RN.     Wound 05/15/25  Irritant Contact Dermatitis Perspiration Sacrum Mid (Active)   Wound Image   05/23/25 1315   Wound Description Pink;Dry 05/23/25 1315   Non-staged Wound Description Not applicable 05/23/25 1315   Wound Length (cm) 0 cm 05/23/25 1315   Wound Width (cm) 0 cm 05/23/25 1315   Wound Depth (cm) 0 cm 05/23/25 1315   Wound Surface Area (cm^2) 0 cm^2 05/23/25 1315   Wound Volume (cm^3) 0 cm^3 05/23/25 1315   Calculated Wound Volume (cm^3) 0 cm^3 05/23/25 1315   Change in Wound Size % 100 05/23/25 1315   Miranda-wound Assessment Scaly 05/23/25 1315   Treatments Cleansed 05/23/25 1315   Dressing Foam, Silicon (eg. Allevyn, etc) 05/23/25 1315   Dressing Changed Changed 05/23/25 1315   Patient Tolerance Tolerated well 05/23/25 1315   Dressing Status Dry;Clean;Intact 05/23/25 1315       Wound 05/16/25 Traumatic Skin Tear Pretibial Left (Active)   Wound Image   05/23/25 1318   Wound Description Pink 05/23/25 1318   Non-staged Wound Description Partial thickness 05/23/25 1318   Wound Length (cm) 1.5 cm 05/23/25 1318   Wound Width (cm) 1 cm 05/23/25 1318   Wound Depth (cm) 0.1 cm 05/23/25 1318   Wound Surface Area (cm^2) 1.18 cm^2 05/23/25 1318   Wound Volume (cm^3) 0.079 cm^3 05/23/25 1318   Calculated Wound Volume (cm^3) 0.15 cm^3 05/23/25 1318   Change in Wound Size % 48.28 05/23/25 1318   Drainage Amount Small 05/23/25 1318   Drainage Description Serosanguineous 05/23/25 1318   Miranda-wound Assessment Intact 05/23/25 1318   Treatments Cleansed 05/23/25 1318   Dressing Dermagran gauze;Foam, Silicon (eg. Allevyn, etc) 05/23/25 1318   Patient Tolerance Tolerated well 05/23/25 1318   Dressing Status Clean;Dry;Intact 05/23/25 1318           Eladia BILLINGSLEYN, RN, CWON

## 2025-05-23 NOTE — ASSESSMENT & PLAN NOTE
Known chronic diabetic foot ulcer, MRI in April suggestive of osteomyelitis of fifth metatarsal head.  Admitted with acute wound cellulitis status post right partial fifth ray resection with assumed surgical cure of osteomyelitis.  There was yellow viscous fluid within the metatarsal phalangeal joint consistent with infection.  On dressing change 5/19 patient had ongoing green-colored drainage concerning for ongoing soft tissue infection.  Patient will need further right foot washout.  OR cx from 5/16 grew Serratia, Enterococcus faecalis and Pseudomonas.  Patient has no major contraindication to fluoroquinolone; Qtc 446, no aortic aneurysms on CT.  Per podiatry a TMA was recommended however patient wanted to save as much as possible of his foot.  He may be at risk for recurrent infection.  - Continue IV Zosyn   - Patient to go to the OR today for partial fourth ray amputation and wound debridement  - If surgical cure can be obtained of osteomyelitis then anticipate 5 to 7-day postop course antibiotic; based on culture would switch to PO Amoxicillin 500 mg every 8 hours (for Enterococcus) plus PO Levofloxacin 750 mg daily (for Pseudomonas and Serratia)  - If there is concern for residual osteomyelitis then patient will need more prolonged course of antibiotic

## 2025-05-23 NOTE — ASSESSMENT & PLAN NOTE
- met SIRS criteria at [facility] with 05/07/25 with tachycardia and leukocytosis   - 5/16: Underwent right partial fifth ray resection: presumed surgical cure  - s/p right CFA SFA endarterectomy/stenting 5/13/2025   - currently on Plavix 75 mg daily (podiatry and vascular following)

## 2025-05-23 NOTE — ASSESSMENT & PLAN NOTE
Admit on initial presentation to [facility] with tachycardia and leukocytosis, secondary to diabetic right foot infection.  Blood cultures negative.  As below patient is with ongoing surgical site infection.  He is afebrile and hemodynamically stable.  -Antibiotic plan as below  -Repeat CBC tomorrow to trend WBC  -Monitor fever curve and hemodynamics  -Ongoing podiatry recommendations appreciated  -Supportive care per primary team

## 2025-05-23 NOTE — ASSESSMENT & PLAN NOTE
Patient had episode of dyspnea after blood transfusion   Most likely volume overload secondary to blood transfusions, IV fluids/antibiotics, in the setting of chronic hpf EF  most recent echocardiogram 10/24: Left ventricular ejection fraction 50%.  Grade 1 diastolic dysfunction.  Mild hypokinesis of the posterior wall  Chest x-ray: Moderate interstitial edema  Patient was placed on diuresis with IV Lasix with improvement  Weight fluctuatin-84kg  Again requesting accurate I's/O: Not noted past 24 hours  Euvolemic today for OR

## 2025-05-23 NOTE — PLAN OF CARE
Problem: PAIN - ADULT  Goal: Verbalizes/displays adequate comfort level or baseline comfort level  Description: Interventions:- Encourage patient to monitor pain and request assistance- Assess pain using appropriate pain scale- Administer analgesics based on type and severity of pain and evaluate response- Implement non-pharmacological measures as appropriate and evaluate response- Consider cultural and social influences on pain and pain management- Notify physician/advanced practitioner if interventions unsuccessful or patient reports new pain  Outcome: Progressing     Problem: DISCHARGE PLANNING  Goal: Discharge to home or other facility with appropriate resources  Description: INTERVENTIONS:- Identify barriers to discharge w/patient and caregiver- Arrange for needed discharge resources and transportation as appropriate- Identify discharge learning needs (meds, wound care, etc.)- Arrange for interpretive services to assist at discharge as needed- Refer to Case Management Department for coordinating discharge planning if the patient needs post-hospital services based on physician/advanced practitioner order or complex needs related to functional status, cognitive ability, or social support system  Outcome: Progressing     Problem: SKIN/TISSUE INTEGRITY - ADULT  Goal: Skin Integrity remains intact(Skin Breakdown Prevention)  Description: Assess:-Perform Sae assessment -Clean and moisturize skin -Inspect skin when repositioning, toileting, and assisting with ADLS-Assess under medical devices -Assess extremities for adequate circulation and sensation Bed Management:-Have minimal linens on bed & keep smooth, unwrinkled-Change linens as needed when moist or perspiring-Avoid sitting or lying in one position for more than 2 hours while in bed-Keep HOB at 30 degrees Toileting:-Offer bedside commode-Assess for incontinence -Use incontinent care products after each incontinent episode Activity:-Mobilize patient 3 times  a day-Encourage activity and walks on unit-Encourage or provide ROM exercises -Turn and reposition patient every 2 Hours-Use appropriate equipment to lift or move patient in bed-Instruct/ Assist with weight shifting  when out of bed in chair-Consider limitation of chair time 2 hour intervalsSkin Care:-Avoid use of baby powder, tape, friction and shearing, hot water or constrictive clothing-Relieve pressure over bony prominences Do not massage red bony areasNext Steps:-Teach patient strategies to minimize risks Consider consults to  interdisciplinary teams   Outcome: Progressing  Goal: Incision(s), wounds(s) or drain site(s) healing without S/S of infection  Description: INTERVENTIONS- Assess and document dressing, incision, wound bed, drain sites and surrounding tissue- Provide patient and family education- Perform skin care/dressing changes every shift  Outcome: Progressing  Goal: Pressure injury heals and does not worsen  Description: Interventions:- Implement low air loss mattress or specialty surface (Criteria met)- Apply silicone foam dressing- Instruct/assist with weight shifting every 120 minutes when in chair - Limit chair time to 2 hour intervals- Use special pressure reducing interventions such as turning when in chair - Apply fecal or urinary incontinence containment device - Perform passive or active ROM every 2 hours- Turn and reposition patient & offload bony prominences every 2 hours - Utilize friction reducing device or surface for transfers - Consider consults to  interdisciplinary teams such as wound- Use incontinent care products after each incontinent episode such as wipes- Consider nutrition services referral as needed  Outcome: Progressing     Problem: Nutrition/Hydration-ADULT  Goal: Nutrient/Hydration intake appropriate for improving, restoring or maintaining nutritional needs  Description: Monitor and assess patient's nutrition/hydration status for malnutrition. Collaborate with  interdisciplinary team and initiate plan and interventions as ordered.  Monitor patient's weight and dietary intake as ordered or per policy. Utilize nutrition screening tool and intervene as necessary. Determine patient's food preferences and provide high-protein, high-caloric foods as appropriate. INTERVENTIONS:- Monitor oral intake, urinary output, labs, and treatment plans- Assess nutrition and hydration status and recommend course of action- Evaluate amount of meals eaten- Assist patient with eating if necessary - Allow adequate time for meals- Recommend/ encourage appropriate diets, oral nutritional supplements, and vitamin/mineral supplements- Order, calculate, and assess calorie counts as needed- Recommend, monitor, and adjust tube feedings and TPN/PPN based on assessed needs- Assess need for intravenous fluids- Provide specific nutrition/hydration education as appropriate- Include patient/family/caregiver in decisions related to nutrition  Outcome: Progressing     Problem: Prexisting or High Potential for Compromised Skin Integrity  Goal: Skin integrity is maintained or improved  Description: INTERVENTIONS:  - Identify patients at risk for skin breakdown  - Assess and monitor skin integrity  - Assess and monitor nutrition and hydration status  - Monitor labs   - Assess for incontinence   - Turn and reposition patient  - Assist with mobility/ambulation  - Relieve pressure over bony prominences  - Avoid friction and shearing  - Provide appropriate hygiene as needed including keeping skin clean and dry  - Evaluate need for skin moisturizer/barrier cream  - Collaborate with interdisciplinary team   - Patient/family teaching  - Consider wound care consult   Outcome: Progressing     Problem: INFECTION - ADULT  Goal: Absence or prevention of progression during hospitalization  Description: INTERVENTIONS:  - Assess and monitor for signs and symptoms of infection  - Monitor lab/diagnostic results  - Monitor all  insertion sites, i.e. indwelling lines, tubes, and drains  - Monitor endotracheal if appropriate and nasal secretions for changes in amount and color  - [place] appropriate cooling/warming therapies per order  - Administer medications as ordered  - Instruct and encourage patient and family to use good hand hygiene technique  - Identify and instruct in appropriate isolation precautions for identified infection/condition  Outcome: Progressing  Goal: Absence of fever/infection during neutropenic period  Description: INTERVENTIONS:  - Monitor WBC    Outcome: Progressing     Problem: SAFETY ADULT  Goal: Patient will remain free of falls  Description: INTERVENTIONS:  - Educate patient/family on patient safety including physical limitations  - Instruct patient to call for assistance with activity   - Consult OT/PT to assist with strengthening/mobility   - Keep Call bell within reach  - Keep bed low and locked with side rails adjusted as appropriate  - Keep care items and personal belongings within reach  - Initiate and maintain comfort rounds  - Make Fall Risk Sign visible to staff  - Offer Toileting every 2 Hours, in advance of need  - Initiate/Maintain bed alarm  - Obtain necessary fall risk management equipment:   - Apply yellow socks and bracelet for high fall risk patients  - Consider moving patient to room near nurses station  Outcome: Progressing  Goal: Maintain or return to baseline ADL function  Description: INTERVENTIONS:  -  Assess patient's ability to carry out ADLs; assess patient's baseline for ADL function and identify physical deficits which impact ability to perform ADLs (bathing, care of mouth/teeth, toileting, grooming, dressing, etc.)  - Assess/evaluate cause of self-care deficits   - Assess range of motion  - Assess patient's mobility; develop plan if impaired  - Assess patient's need for assistive devices and provide as appropriate  - Encourage maximum independence but intervene and supervise when  necessary  - Involve family in performance of ADLs  - Assess for home care needs following discharge   - Consider OT consult to assist with ADL evaluation and planning for discharge  - Provide patient education as appropriate  Outcome: Progressing  Goal: Maintains/Returns to pre admission functional level  Description: INTERVENTIONS:  - Perform AM-PAC 6 Click Basic Mobility/ Daily Activity assessment daily.  - Set and communicate daily mobility goal to care team and patient/family/caregiver.   - Collaborate with rehabilitation services on mobility goals if consulted  - Perform Range of Motion 4 times a day.  - Reposition patient every 3 hours.  - Dangle patient 3 times a day  - Stand patient 3 times a day  - Ambulate patient 3 times a day  - Out of bed to chair 3 times a day   - Out of bed for meals 3 times a day  - Out of bed for toileting  - Record patient progress and toleration of activity level   Outcome: Progressing     Problem: Knowledge Deficit  Goal: Patient/family/caregiver demonstrates understanding of disease process, treatment plan, medications, and discharge instructions  Description: Complete learning assessment and assess knowledge base.  Interventions:  - Provide teaching at level of understanding  - Provide teaching via preferred learning methods  Outcome: Progressing     Problem: METABOLIC, FLUID AND ELECTROLYTES - ADULT  Goal: Electrolytes maintained within normal limits  Description: INTERVENTIONS:  - Monitor labs and assess patient for signs and symptoms of electrolyte imbalances  - Administer electrolyte replacement as ordered  - Monitor response to electrolyte replacements, including repeat lab results as appropriate  - Instruct patient on fluid and nutrition as appropriate  Outcome: Progressing     Problem: HEMATOLOGIC - ADULT  Goal: Maintains hematologic stability  Description: INTERVENTIONS  - Assess for signs and symptoms of bleeding or hemorrhage  - Monitor labs  - Administer supportive  blood products/factors as ordered and appropriate  Outcome: Progressing     Problem: CARDIOVASCULAR - ADULT  Goal: Maintains optimal cardiac output and hemodynamic stability  Description: INTERVENTIONS:  - Monitor I/O, vital signs and rhythm  - Monitor for S/S and trends of decreased cardiac output  - Administer and titrate ordered vasoactive medications to optimize hemodynamic stability  - Assess quality of pulses, skin color and temperature  - Assess for signs of decreased coronary artery perfusion  - Instruct patient to report change in severity of symptoms  Outcome: Progressing  Goal: Absence of cardiac dysrhythmias or at baseline rhythm  Description: INTERVENTIONS:  - Continuous cardiac monitoring, vital signs, obtain 12 lead EKG if ordered  - Administer antiarrhythmic and heart rate control medications as ordered  - Monitor electrolytes and administer replacement therapy as ordered  Outcome: Progressing

## 2025-05-23 NOTE — ASSESSMENT & PLAN NOTE
TODAY'S (05/21/25) ASSESSMENT   HPI / Last 24 hours No acute events.  Patient underwent podiatric surgery with right partial fourth ray amputation along with wound debridement.  Tolerated the procedure well.  EBL was minimal..   Subjective Currently patient feels well.  Denies chest pain shortness of breath or dizziness or palpitations.   VITALS highlights Heart rate currently in 60s-70s; blood pressure  120s-130s/ 60s-70s 98-99 % on room air   EXAM highlights Lean build, conjunctival pallor, no JVD, no carotid bruit, regular rate rhythm, normal intensity heart sounds, decreased breath sounds at bases without any crackles, surgical dressing in right lower extremity, ganglion cyst in the right wrist region, bruising and subcutaneous bleed in right arm surgical dressing in right lower extremity.   LABS highlights Postoperative labs: Hemoglobin 9.7, hematocrit 29.8; high sensitive troponin 100    Preoperative labs today sodium 134 potassium 4.2 chloride 104 bicarb 24 BUN 11 creatinine 0.98 GFR 76 magnesium 2.0 glucose 182 calcium 7.8  132 potassium 4.2 chloride 100 bicarb 24 BUN 18 creatinine 1.06 GFR 66  High-sensitivity troponins 163 -- 155 -- 193 -- 237.  -- 159 yesterday   on 5/19/2025   ECHO and other studies TTE 5/19/2025: EF 50%, low normal, decrease GLS -11%, grade 1 diastolic dysfunction, hypokinesis of mid anteroseptal and apical septal walls; normal RV size and function; mild LA enlargement and normal RA size, prominent eustachian valve or cor triatriatum, lipomatous hypertrophy of interatrial septum, mildly thickened AV with mild AI, thickened MV with mild MAC and MV calcification, mild MR, trace TR, no obvious pulmonary hypertension, no pericardial effusion.    Cardiac SPECT 1/9/2025: Normal perfusion, EF 65%.    TTE 10/01/24: LVEF 50%, mild hypokinesia of the posterior wall, grade I DD, mild AR     CXR 5/19/2025: Moderate interstitial edema, normal cardiomediastinal silhouette significant pleural  effusion   Telemetry & ECG ECG performed after surgery: Sinus rhythm with inferolateral ST-T wave abnormalities slightly better compared to  Currently not on telemetry.  ECG 5/22/2025 lateral ST changes suggestive of ischemia similar to previous ECG from 5/19/2025.   Current GDMT Apixaban 5 mg twice daily + atenolol 25 mg daily + atorvastatin 40 mg daily + amlodipine 5 mg twice daily + clopidogrel 75 mg daily + furosemide 40 mg IV twice daily + lisinopril 20 mg p.o. twice daily     Patient seen in the immediate postoperative state.  Tolerated surgery well without acute decompensation.  Gives no symptoms suggestive of angina or decompensated heart failure.  Hemoglobin and hematocrit noted to be stable and elevated compared to before.  Clinically does not appear to be failure.      Continue current cardiac regiment.  Follow-up on basic metabolic panel and hemoglobin and hematocrit in AM.  Will keep hemoglobin greater than 9.  Continued close monitoring for signs of coronary ischemia and congestive heart failure.

## 2025-05-23 NOTE — PROGRESS NOTES
Progress Note - Infectious Disease   Name: Gold Van 79 y.o. male I MRN: [id number]  Unit/Bed#: OR POOL I Date of Admission: 5/11/2025   Date of Service: 5/23/2025 I Hospital Day: 12     Assessment & Plan  Sepsis without acute organ dysfunction (HCC)  Admit on initial presentation to [facility] with tachycardia and leukocytosis, secondary to diabetic right foot infection.  Blood cultures negative.  As below patient is with ongoing surgical site infection.  He is afebrile and hemodynamically stable.  -Antibiotic plan as below  -Repeat CBC tomorrow to trend WBC  -Monitor fever curve and hemodynamics  -Ongoing podiatry recommendations appreciated  -Supportive care per primary team  Ulcer of right foot with fat layer exposed secondary to osteomyelitis  Known chronic diabetic foot ulcer, MRI in April suggestive of osteomyelitis of fifth metatarsal head.  Admitted with acute wound cellulitis status post right partial fifth ray resection with assumed surgical cure of osteomyelitis.  There was yellow viscous fluid within the metatarsal phalangeal joint consistent with infection.  On dressing change 5/19 patient had ongoing green-colored drainage concerning for ongoing soft tissue infection.  Patient will need further right foot washout.  OR cx from 5/16 grew Serratia, Enterococcus faecalis and Pseudomonas.  Patient has no major contraindication to fluoroquinolone; Qtc 446, no aortic aneurysms on CT.  Per podiatry a TMA was recommended however patient wanted to save as much as possible of his foot.  He may be at risk for recurrent infection.  - Continue IV Zosyn   - Patient to go to the OR today for partial fourth ray amputation and wound debridement  - If surgical cure can be obtained of osteomyelitis then anticipate 5 to 7-day postop course antibiotic; based on culture would switch to PO Amoxicillin 500 mg every 8 hours (for Enterococcus) plus PO Levofloxacin 750 mg daily (for Pseudomonas and Serratia)  - If there  is concern for residual osteomyelitis then patient will need more prolonged course of antibiotic  Osteomyelitis ([facility])  Of fifth metatarsal head in setting of diabetic foot ulcer.  Now status post partial fifth ray resection with presumed surgical cure on 5/13.  -Follow-up repeat podiatry amputation and washout today  -Will need to confirm if there is any ongoing concern for residual bone infection postop  Severe peripheral arterial disease ([facility])  Status post vascular surgery intervention on 5/13 with right common femoral endarterectomy with bovine pericardial patch angioplasty, right SFA stenting, right angioplasty.  Type 2 diabetes mellitus with complication, without long-term current use of insulin ([facility])  Lab Results   Component Value Date    HGBA1C 9.3 (H) 04/09/2025   Significant risk factor for infection and poor wound healing.  -Tight glycemic control as per primary team  PAF (paroxysmal atrial fibrillation) ([facility])  On Eliquis at home.  Chronic heart failure with preserved ejection fraction (HFpEF) ([facility])  Cardiology following.    I have discussed the above management plan in detail with the primary service.  They agree with plan to continue IV Zosyn, follow-up or findings.    Antibiotics:  Zosyn    Subjective   Patient has no fever, chills, sweats; no nausea, vomiting, diarrhea.  His surgery was again postponed yesterday, scheduled for today now.    Objective :  Temp:  [97.8 °F (36.6 °C)-98.7 °F (37.1 °C)] 97.9 °F (36.6 °C)  HR:  [72-83] 72  BP: (106-138)/(51-68) 130/68  Resp:  [16-18] 16  SpO2:  [92 %-97 %] 97 %  O2 Device: EtCO2 nasal cannula    General:  No acute distress  Psychiatric:  Awake and alert  Pulmonary:  Normal respiratory excursion without accessory muscle use  Abdomen:  Soft, nontender  Extremities: Right foot with intact dressing.  No erythema noted of either leg.  Skin:  No rashes      Lab Results: I have reviewed the following results:  Results from last 7 days   Lab Units 05/23/25  8531  05/22/25  0529 05/21/25  0458   WBC Thousand/uL 7.13 7.61 10.65*   HEMOGLOBIN g/dL 8.8* 9.0* 9.1*   PLATELETS Thousands/uL 570* 568* 532*     Results from last 7 days   Lab Units 05/23/25  0503 05/22/25  0529 05/21/25 2018   SODIUM mmol/L 134* 135 132*   POTASSIUM mmol/L 4.2 3.9 4.2   CHLORIDE mmol/L 104 102 100   CO2 mmol/L 24 26 24   BUN mg/dL 11 14 18   CREATININE mg/dL 0.94 0.89 1.06   EGFR ml/min/1.73sq m 76 81 66   CALCIUM mg/dL 7.8* 8.0* 7.7*

## 2025-05-23 NOTE — ASSESSMENT & PLAN NOTE
- met SIRS criteria at [facility] with 05/07/25 with tachycardia and leukocytosis   - 5/16: Underwent right partial fifth ray resection: presumed surgical cure  - s/p right CFA SFA endarterectomy/stenting 5/13/2025   - currently on Plavix 75 mg daily (podiatry and vascular following)

## 2025-05-24 LAB
ANION GAP SERPL CALCULATED.3IONS-SCNC: 6 MMOL/L (ref 4–13)
ATRIAL RATE: 71 BPM
BASOPHILS # BLD AUTO: 0.05 THOUSANDS/ÂΜL (ref 0–0.1)
BASOPHILS NFR BLD AUTO: 1 % (ref 0–1)
BUN SERPL-MCNC: 10 MG/DL (ref 5–25)
CALCIUM SERPL-MCNC: 8.2 MG/DL (ref 8.4–10.2)
CHLORIDE SERPL-SCNC: 103 MMOL/L (ref 96–108)
CO2 SERPL-SCNC: 24 MMOL/L (ref 21–32)
CREAT SERPL-MCNC: 0.88 MG/DL (ref 0.6–1.3)
EOSINOPHIL # BLD AUTO: 0.15 THOUSAND/ÂΜL (ref 0–0.61)
EOSINOPHIL NFR BLD AUTO: 1 % (ref 0–6)
ERYTHROCYTE [DISTWIDTH] IN BLOOD BY AUTOMATED COUNT: 15.8 % (ref 11.6–15.1)
GFR SERPL CREATININE-BSD FRML MDRD: 81 ML/MIN/1.73SQ M
GLUCOSE SERPL-MCNC: 227 MG/DL (ref 65–140)
GLUCOSE SERPL-MCNC: 230 MG/DL (ref 65–140)
GLUCOSE SERPL-MCNC: 273 MG/DL (ref 65–140)
GLUCOSE SERPL-MCNC: 286 MG/DL (ref 65–140)
GLUCOSE SERPL-MCNC: 293 MG/DL (ref 65–140)
HCT VFR BLD AUTO: 26.7 % (ref 36.5–49.3)
HGB BLD-MCNC: 8.5 G/DL (ref 12–17)
IMM GRANULOCYTES # BLD AUTO: 0.12 THOUSAND/UL (ref 0–0.2)
IMM GRANULOCYTES NFR BLD AUTO: 1 % (ref 0–2)
LYMPHOCYTES # BLD AUTO: 1.47 THOUSANDS/ÂΜL (ref 0.6–4.47)
LYMPHOCYTES NFR BLD AUTO: 14 % (ref 14–44)
MCH RBC QN AUTO: 29.8 PG (ref 26.8–34.3)
MCHC RBC AUTO-ENTMCNC: 31.8 G/DL (ref 31.4–37.4)
MCV RBC AUTO: 94 FL (ref 82–98)
MONOCYTES # BLD AUTO: 0.99 THOUSAND/ÂΜL (ref 0.17–1.22)
MONOCYTES NFR BLD AUTO: 10 % (ref 4–12)
NEUTROPHILS # BLD AUTO: 7.69 THOUSANDS/ÂΜL (ref 1.85–7.62)
NEUTS SEG NFR BLD AUTO: 73 % (ref 43–75)
NRBC BLD AUTO-RTO: 0 /100 WBCS
P AXIS: 71 DEGREES
PLATELET # BLD AUTO: 588 THOUSANDS/UL (ref 149–390)
PMV BLD AUTO: 8.9 FL (ref 8.9–12.7)
POTASSIUM SERPL-SCNC: 4.3 MMOL/L (ref 3.5–5.3)
PR INTERVAL: 154 MS
QRS AXIS: 80 DEGREES
QRSD INTERVAL: 84 MS
QT INTERVAL: 416 MS
QTC INTERVAL: 453 MS
RBC # BLD AUTO: 2.85 MILLION/UL (ref 3.88–5.62)
SODIUM SERPL-SCNC: 133 MMOL/L (ref 135–147)
T WAVE AXIS: 270 DEGREES
VENTRICULAR RATE: 71 BPM
WBC # BLD AUTO: 10.47 THOUSAND/UL (ref 4.31–10.16)

## 2025-05-24 PROCEDURE — 99024 POSTOP FOLLOW-UP VISIT: CPT

## 2025-05-24 PROCEDURE — G0545 PR INHERENT VISIT TO INPT: HCPCS | Performed by: INTERNAL MEDICINE

## 2025-05-24 PROCEDURE — 11045 DBRDMT SUBQ TISS EACH ADDL: CPT | Performed by: PODIATRIST

## 2025-05-24 PROCEDURE — 97605 NEG PRS WND THER DME<=50SQCM: CPT | Performed by: PODIATRIST

## 2025-05-24 PROCEDURE — 11042 DBRDMT SUBQ TIS 1ST 20SQCM/<: CPT | Performed by: PODIATRIST

## 2025-05-24 PROCEDURE — 82948 REAGENT STRIP/BLOOD GLUCOSE: CPT

## 2025-05-24 PROCEDURE — 80048 BASIC METABOLIC PNL TOTAL CA: CPT

## 2025-05-24 PROCEDURE — 93010 ELECTROCARDIOGRAM REPORT: CPT | Performed by: INTERNAL MEDICINE

## 2025-05-24 PROCEDURE — 99232 SBSQ HOSP IP/OBS MODERATE 35: CPT | Performed by: INTERNAL MEDICINE

## 2025-05-24 PROCEDURE — 99233 SBSQ HOSP IP/OBS HIGH 50: CPT | Performed by: INTERNAL MEDICINE

## 2025-05-24 PROCEDURE — 0JBQ0ZZ EXCISION OF RIGHT FOOT SUBCUTANEOUS TISSUE AND FASCIA, OPEN APPROACH: ICD-10-PCS | Performed by: PODIATRIST

## 2025-05-24 PROCEDURE — 85025 COMPLETE CBC W/AUTO DIFF WBC: CPT

## 2025-05-24 RX ADMIN — INSULIN LISPRO 3 UNITS: 100 INJECTION, SOLUTION INTRAVENOUS; SUBCUTANEOUS at 11:57

## 2025-05-24 RX ADMIN — PIPERACILLIN AND TAZOBACTAM 4.5 G: 36; 4.5 INJECTION, POWDER, LYOPHILIZED, FOR SOLUTION INTRAVENOUS at 23:43

## 2025-05-24 RX ADMIN — ATORVASTATIN CALCIUM 40 MG: 40 TABLET, FILM COATED ORAL at 17:04

## 2025-05-24 RX ADMIN — INSULIN LISPRO 3 UNITS: 100 INJECTION, SOLUTION INTRAVENOUS; SUBCUTANEOUS at 17:04

## 2025-05-24 RX ADMIN — MORPHINE SULFATE 2 MG: 2 INJECTION, SOLUTION INTRAMUSCULAR; INTRAVENOUS at 06:08

## 2025-05-24 RX ADMIN — OXYCODONE HYDROCHLORIDE 10 MG: 10 TABLET ORAL at 20:21

## 2025-05-24 RX ADMIN — BUDESONIDE, GLYCOPYRROLATE, AND FORMOTEROL FUMARATE 2 PUFF: 160; 9; 4.8 AEROSOL, METERED RESPIRATORY (INHALATION) at 20:23

## 2025-05-24 RX ADMIN — INSULIN LISPRO 3 UNITS: 100 INJECTION, SOLUTION INTRAVENOUS; SUBCUTANEOUS at 21:20

## 2025-05-24 RX ADMIN — FAMOTIDINE 20 MG: 20 TABLET, FILM COATED ORAL at 08:55

## 2025-05-24 RX ADMIN — LISINOPRIL 20 MG: 20 TABLET ORAL at 20:21

## 2025-05-24 RX ADMIN — INSULIN LISPRO 2 UNITS: 100 INJECTION, SOLUTION INTRAVENOUS; SUBCUTANEOUS at 08:55

## 2025-05-24 RX ADMIN — ACETAMINOPHEN 975 MG: 325 TABLET, FILM COATED ORAL at 14:54

## 2025-05-24 RX ADMIN — PIPERACILLIN AND TAZOBACTAM 4.5 G: 36; 4.5 INJECTION, POWDER, LYOPHILIZED, FOR SOLUTION INTRAVENOUS at 06:08

## 2025-05-24 RX ADMIN — MONTELUKAST 10 MG: 10 TABLET, FILM COATED ORAL at 21:16

## 2025-05-24 RX ADMIN — MORPHINE SULFATE 2 MG: 2 INJECTION, SOLUTION INTRAMUSCULAR; INTRAVENOUS at 23:43

## 2025-05-24 RX ADMIN — BUDESONIDE, GLYCOPYRROLATE, AND FORMOTEROL FUMARATE 2 PUFF: 160; 9; 4.8 AEROSOL, METERED RESPIRATORY (INHALATION) at 08:55

## 2025-05-24 RX ADMIN — APIXABAN 5 MG: 5 TABLET, FILM COATED ORAL at 08:55

## 2025-05-24 RX ADMIN — ACETAMINOPHEN 975 MG: 325 TABLET, FILM COATED ORAL at 21:16

## 2025-05-24 RX ADMIN — CLOPIDOGREL BISULFATE 75 MG: 75 TABLET, FILM COATED ORAL at 08:55

## 2025-05-24 RX ADMIN — PREDNISONE 5 MG: 5 TABLET ORAL at 08:55

## 2025-05-24 RX ADMIN — ACETAMINOPHEN 975 MG: 325 TABLET, FILM COATED ORAL at 06:08

## 2025-05-24 RX ADMIN — Medication 1 TABLET: at 08:55

## 2025-05-24 RX ADMIN — LISINOPRIL 20 MG: 20 TABLET ORAL at 08:55

## 2025-05-24 RX ADMIN — APIXABAN 5 MG: 5 TABLET, FILM COATED ORAL at 17:04

## 2025-05-24 RX ADMIN — ATENOLOL 25 MG: 50 TABLET ORAL at 08:55

## 2025-05-24 RX ADMIN — GLIPIZIDE 10 MG: 5 TABLET, FILM COATED, EXTENDED RELEASE ORAL at 17:04

## 2025-05-24 RX ADMIN — FAMOTIDINE 20 MG: 20 TABLET, FILM COATED ORAL at 17:04

## 2025-05-24 RX ADMIN — PIPERACILLIN AND TAZOBACTAM 4.5 G: 36; 4.5 INJECTION, POWDER, LYOPHILIZED, FOR SOLUTION INTRAVENOUS at 15:07

## 2025-05-24 NOTE — ASSESSMENT & PLAN NOTE
TTE 10/1/2024: EF 50%, grade 1 diastolic dysfunction, mild AR  Not on any diuretics at baseline, examines euvolemic at this time

## 2025-05-24 NOTE — ASSESSMENT & PLAN NOTE
Mom called inquiring about prescription for 2 pack victoza. Notified that script was sent yesterday. Mom apologized. Mom noted she did not receive call back as [facility] in [facility] so she was not notified prescription was available. [facility] will [facility] to follow up.     Not patient is overdue for appointment as last office visit was in January and has canceled the last 2 appointments with Dr Segal. Prior to admission on [facility].  No exacerbation  Albuterol metered-dose inhaler as needed

## 2025-05-24 NOTE — ASSESSMENT & PLAN NOTE
Admit on initial presentation to [facility] with tachycardia and leukocytosis, secondary to diabetic right foot infection.  Blood cultures negative.  As below patient is with ongoing surgical site infection.  He is afebrile and hemodynamically stable.  -Antibiotic plan as below  -Repeat CBC tomorrow to trend WBC  -Monitor fever curve and hemodynamics  -Ongoing podiatry recommendations appreciated  -Supportive care per primary team

## 2025-05-24 NOTE — ASSESSMENT & PLAN NOTE
Patient is a 79-year-old male with past medical history significant for DM2, PAF, asthma, and PAD who presented with right foot wound  Met sepsis criteria at [facility] initially  Diagnosed with right fifth metatarsal osteomyelitis with right foot cellulitis, started on abx and transferred here for vascular surgery  S/p vascular surgery and right fifth ray amputation 5/16  Status post fourth ray resection 5/20  intraoperative cultures: Serratia/Enterococcus and pseudomonas  Blood culture negative x 2  Appreciate evaluation and recommendations from infectious disease team: Continue antibiotics with Zosyn  Anticipate return to the OR today (was postponed yesterday)  Per ID: If surgical cure anticipated, recommend 5-7 days postop antibiotic

## 2025-05-24 NOTE — ASSESSMENT & PLAN NOTE
- met SIRS criteria at [facility] with 05/07/25 with tachycardia and leukocytosis   - 5/16: Underwent right partial fifth ray resection: presumed surgical cure  - s/p right CFA SFA endarterectomy/stenting 5/13/2025   - currently on Plavix 75 mg daily (podiatry and vascular following)

## 2025-05-24 NOTE — ASSESSMENT & PLAN NOTE
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  Now status post partial fifth ray resection with presumed surgical cure on 5/13.  Patient is status post repeat washout and resection of fifth metatarsal stump, with partial fourth ray resection, with residual bones hard, viable and with bleeding, all are signs of absence of residual osteomyelitis.  As seen above, patient does not need long-term antibiotic.  - Antibiotic plan as above  - Wound care per podiatry

## 2025-05-24 NOTE — ASSESSMENT & PLAN NOTE
Lab Results   Component Value Date    HGBA1C 9.3 (H) 04/09/2025     Recent Labs     05/23/25  1513 05/23/25  1606 05/23/25 2052 05/24/25  0725   POCGLU 191* 188* 209* 230*     Prior to admission meds: glipizide, linagliptin and metformin  A1c 9.3.  Endocrinology consulted by vascular surgery  Oral agents on hold preoperatively  Accu-Cheks and sliding scale insulin

## 2025-05-24 NOTE — ASSESSMENT & PLAN NOTE
Follows with [facility] cardiology: Most recent note 4/25 was reviewed  Continue atenolol 25 mg daily.  Anticoagulation: Apixaban   Continue beta-blocker (changed hold parameters due to episode of rapid heart rate when beta-blocker was held)  Currently in sinus rhythm

## 2025-05-24 NOTE — ASSESSMENT & PLAN NOTE
Right fifth metatarsal ulceration with underlying osteomyelitis and surrounding right foot cellulitis  Treated with cefazolin/Flagyl empirically  5/16: Underwent right partial fifth ray resection  5/20 right partial fourth ray amputation, wound debridement  cultures with Serratia/Enterococcus, and Pseudomonas  Antibiotics changed to IV Zosyn  ID consult appreciated: If anticipated surgical cure recommend 5-7 days antibiotics postintervention

## 2025-05-24 NOTE — NURSING NOTE
Reached out to podiatry resident, Dr. Comer, at 0745 for wound vac alarming blockage. Advised to massage wound vac tubing to dislodge tubing blockage. Attempted to do so unsuccessfully. Dr. Comer advised to remove vac and place a wet to dry dressing. Wet to dry dressing was placed, covered with ABD, kerlix, and ACE wrap.

## 2025-05-24 NOTE — PLAN OF CARE
Problem: PAIN - ADULT  Goal: Verbalizes/displays adequate comfort level or baseline comfort level  Description: Interventions:- Encourage patient to monitor pain and request assistance- Assess pain using appropriate pain scale- Administer analgesics based on type and severity of pain and evaluate response- Implement non-pharmacological measures as appropriate and evaluate response- Consider cultural and social influences on pain and pain management- Notify physician/advanced practitioner if interventions unsuccessful or patient reports new pain  Outcome: Progressing     Problem: DISCHARGE PLANNING  Goal: Discharge to home or other facility with appropriate resources  Description: INTERVENTIONS:- Identify barriers to discharge w/patient and caregiver- Arrange for needed discharge resources and transportation as appropriate- Identify discharge learning needs (meds, wound care, etc.)- Arrange for interpretive services to assist at discharge as needed- Refer to Case Management Department for coordinating discharge planning if the patient needs post-hospital services based on physician/advanced practitioner order or complex needs related to functional status, cognitive ability, or social support system  Outcome: Progressing     Problem: INFECTION - ADULT  Goal: Absence or prevention of progression during hospitalization  Description: INTERVENTIONS:  - Assess and monitor for signs and symptoms of infection  - Monitor lab/diagnostic results  - Monitor all insertion sites, i.e. indwelling lines, tubes, and drains  - Monitor endotracheal if appropriate and nasal secretions for changes in amount and color  - Camas appropriate cooling/warming therapies per order  - Administer medications as ordered  - Instruct and encourage patient and family to use good hand hygiene technique  - Identify and instruct in appropriate isolation precautions for identified infection/condition  Outcome: Progressing  Goal: Absence of  fever/infection during neutropenic period  Description: INTERVENTIONS:  - Monitor WBC    Outcome: Progressing     Problem: SAFETY ADULT  Goal: Patient will remain free of falls  Description: INTERVENTIONS:  - Educate patient/family on patient safety including physical limitations  - Instruct patient to call for assistance with activity   - Consult OT/PT to assist with strengthening/mobility   - Keep Call bell within reach  - Keep bed low and locked with side rails adjusted as appropriate  - Keep care items and personal belongings within reach  - Initiate and maintain comfort rounds  - Make Fall Risk Sign visible to staff  - Offer Toileting every 2 Hours, in advance of need  - Initiate/Maintain bed alarm  - Obtain necessary fall risk management equipment:   - Apply yellow socks and bracelet for high fall risk patients  - Consider moving patient to room near nurses station  Outcome: Progressing  Goal: Maintain or return to baseline ADL function  Description: INTERVENTIONS:  -  Assess patient's ability to carry out ADLs; assess patient's baseline for ADL function and identify physical deficits which impact ability to perform ADLs (bathing, care of mouth/teeth, toileting, grooming, dressing, etc.)  - Assess/evaluate cause of self-care deficits   - Assess range of motion  - Assess patient's mobility; develop plan if impaired  - Assess patient's need for assistive devices and provide as appropriate  - Encourage maximum independence but intervene and supervise when necessary  - Involve family in performance of ADLs  - Assess for home care needs following discharge   - Consider OT consult to assist with ADL evaluation and planning for discharge  - Provide patient education as appropriate  Outcome: Progressing  Goal: Maintains/Returns to pre admission functional level  Description: INTERVENTIONS:  - Perform AM-PAC 6 Click Basic Mobility/ Daily Activity assessment daily.  - Set and communicate daily mobility goal to care team  and patient/family/caregiver.   - Collaborate with rehabilitation services on mobility goals if consulted  - Perform Range of Motion 4 times a day.  - Reposition patient every 3 hours.  - Dangle patient 3 times a day  - Stand patient 3 times a day  - Ambulate patient 3 times a day  - Out of bed to chair 3 times a day   - Out of bed for meals 3 times a day  - Out of bed for toileting  - Record patient progress and toleration of activity level   Outcome: Progressing     Problem: Knowledge Deficit  Goal: Patient/family/caregiver demonstrates understanding of disease process, treatment plan, medications, and discharge instructions  Description: Complete learning assessment and assess knowledge base.  Interventions:  - Provide teaching at level of understanding  - Provide teaching via preferred learning methods  Outcome: Progressing     Problem: SKIN/TISSUE INTEGRITY - ADULT  Goal: Skin Integrity remains intact(Skin Breakdown Prevention)  Description: Assess:-Perform Sae assessment -Clean and moisturize skin -Inspect skin when repositioning, toileting, and assisting with ADLS-Assess under medical devices -Assess extremities for adequate circulation and sensation Bed Management:-Have minimal linens on bed & keep smooth, unwrinkled-Change linens as needed when moist or perspiring-Avoid sitting or lying in one position for more than 2 hours while in bed-Keep HOB at 30 degrees Toileting:-Offer bedside commode-Assess for incontinence -Use incontinent care products after each incontinent episode Activity:-Mobilize patient 3 times a day-Encourage activity and walks on unit-Encourage or provide ROM exercises -Turn and reposition patient every 2 Hours-Use appropriate equipment to lift or move patient in bed-Instruct/ Assist with weight shifting  when out of bed in chair-Consider limitation of chair time 2 hour intervalsSkin Care:-Avoid use of baby powder, tape, friction and shearing, hot water or constrictive clothing-Relieve  pressure over bony prominences Do not massage red bony areasNext Steps:-Teach patient strategies to minimize risks Consider consults to  interdisciplinary teams   Outcome: Progressing  Goal: Incision(s), wounds(s) or drain site(s) healing without S/S of infection  Description: INTERVENTIONS- Assess and document dressing, incision, wound bed, drain sites and surrounding tissue- Provide patient and family education- Perform skin care/dressing changes every shift  Outcome: Progressing  Goal: Pressure injury heals and does not worsen  Description: Interventions:- Implement low air loss mattress or specialty surface (Criteria met)- Apply silicone foam dressing- Instruct/assist with weight shifting every 120 minutes when in chair - Limit chair time to 2 hour intervals- Use special pressure reducing interventions such as turning when in chair - Apply fecal or urinary incontinence containment device - Perform passive or active ROM every 2 hours- Turn and reposition patient & offload bony prominences every 2 hours - Utilize friction reducing device or surface for transfers - Consider consults to  interdisciplinary teams such as wound- Use incontinent care products after each incontinent episode such as wipes- Consider nutrition services referral as needed  Outcome: Progressing     Problem: METABOLIC, FLUID AND ELECTROLYTES - ADULT  Goal: Electrolytes maintained within normal limits  Description: INTERVENTIONS:  - Monitor labs and assess patient for signs and symptoms of electrolyte imbalances  - Administer electrolyte replacement as ordered  - Monitor response to electrolyte replacements, including repeat lab results as appropriate  - Instruct patient on fluid and nutrition as appropriate  Outcome: Progressing     Problem: HEMATOLOGIC - ADULT  Goal: Maintains hematologic stability  Description: INTERVENTIONS  - Assess for signs and symptoms of bleeding or hemorrhage  - Monitor labs  - Administer supportive blood  products/factors as ordered and appropriate  Outcome: Progressing     Problem: CARDIOVASCULAR - ADULT  Goal: Maintains optimal cardiac output and hemodynamic stability  Description: INTERVENTIONS:  - Monitor I/O, vital signs and rhythm  - Monitor for S/S and trends of decreased cardiac output  - Administer and titrate ordered vasoactive medications to optimize hemodynamic stability  - Assess quality of pulses, skin color and temperature  - Assess for signs of decreased coronary artery perfusion  - Instruct patient to report change in severity of symptoms  Outcome: Progressing  Goal: Absence of cardiac dysrhythmias or at baseline rhythm  Description: INTERVENTIONS:  - Continuous cardiac monitoring, vital signs, obtain 12 lead EKG if ordered  - Administer antiarrhythmic and heart rate control medications as ordered  - Monitor electrolytes and administer replacement therapy as ordered  Outcome: Progressing     Problem: Nutrition/Hydration-ADULT  Goal: Nutrient/Hydration intake appropriate for improving, restoring or maintaining nutritional needs  Description: Monitor and assess patient's nutrition/hydration status for malnutrition. Collaborate with interdisciplinary team and initiate plan and interventions as ordered.  Monitor patient's weight and dietary intake as ordered or per policy. Utilize nutrition screening tool and intervene as necessary. Determine patient's food preferences and provide high-protein, high-caloric foods as appropriate. INTERVENTIONS:- Monitor oral intake, urinary output, labs, and treatment plans- Assess nutrition and hydration status and recommend course of action- Evaluate amount of meals eaten- Assist patient with eating if necessary - Allow adequate time for meals- Recommend/ encourage appropriate diets, oral nutritional supplements, and vitamin/mineral supplements- Order, calculate, and assess calorie counts as needed- Recommend, monitor, and adjust tube feedings and TPN/PPN based on  assessed needs- Assess need for intravenous fluids- Provide specific nutrition/hydration education as appropriate- Include patient/family/caregiver in decisions related to nutrition  Outcome: Progressing     Problem: Prexisting or High Potential for Compromised Skin Integrity  Goal: Skin integrity is maintained or improved  Description: INTERVENTIONS:  - Identify patients at risk for skin breakdown  - Assess and monitor skin integrity  - Assess and monitor nutrition and hydration status  - Monitor labs   - Assess for incontinence   - Turn and reposition patient  - Assist with mobility/ambulation  - Relieve pressure over bony prominences  - Avoid friction and shearing  - Provide appropriate hygiene as needed including keeping skin clean and dry  - Evaluate need for skin moisturizer/barrier cream  - Collaborate with interdisciplinary team   - Patient/family teaching  - Consider wound care consult   Outcome: Progressing

## 2025-05-24 NOTE — ASSESSMENT & PLAN NOTE
Known chronic diabetic foot ulcer, MRI in April suggestive of osteomyelitis of fifth metatarsal head.  Admitted with acute wound cellulitis status post right partial fifth ray resection with assumed surgical cure of osteomyelitis.  There was yellow viscous fluid within the metatarsal phalangeal joint consistent with infection.  On dressing change 5/19 patient had ongoing green-colored drainage concerning for ongoing soft tissue infection.  Patient will need further right foot washout.  OR cx from 5/16 grew Serratia, Enterococcus faecalis and Pseudomonas.  Patient has no major contraindication to fluoroquinolone; Qtc 446, no aortic aneurysms on CT.  Per podiatry a TMA was recommended however patient wanted to save as much as possible of his foot.  Patient was taken back to the OR on 5/23 with further I&D and right partial fourth ray resection and further resection of fifth metatarsal stump.  Both wounds were noted to be hard, viable with bleeding at surgery.  No purulence were encountered.  With absence of residual osteomyelitis, patient does not need long-term IV antibiotic.  - Continue IV Zosyn   - Treat x 7-day postop, through 5/30  - At discharge, transition to PO Amoxicillin 500 mg every 8 hours (for Enterococcus) plus PO Levofloxacin 750 mg daily (for Pseudomonas and Serratia)  - VAC/wound care per podiatry

## 2025-05-24 NOTE — ASSESSMENT & PLAN NOTE
- met SIRS criteria at [facility] with 05/07/25 with tachycardia and leukocytosis   - 5/16: Underwent right partial fifth ray resection: presumed surgical cure  - s/p right CFA SFA endarterectomy/stenting 5/13/2025   - currently on Plavix 75 mg daily (podiatry and vascular following)

## 2025-05-24 NOTE — PROGRESS NOTES
Progress Note - Infectious Disease   Name: Gold Van 79 y.o. male I MRN: [id number]  Unit/Bed#: [place] [address] -01 I Date of Admission: 5/11/2025   Date of Service: 5/24/2025 I Hospital Day: 13    Assessment & Plan  Sepsis without acute organ dysfunction (HCC)  Admit on initial presentation to [facility] with tachycardia and leukocytosis, secondary to diabetic right foot infection.  Blood cultures negative.  As below patient is with ongoing surgical site infection.  He is afebrile and hemodynamically stable.  -Antibiotic plan as below  -Repeat CBC tomorrow to trend WBC  -Monitor fever curve and hemodynamics  -Ongoing podiatry recommendations appreciated  -Supportive care per primary team  Ulcer of right foot with fat layer exposed secondary to osteomyelitis  Known chronic diabetic foot ulcer, MRI in April suggestive of osteomyelitis of fifth metatarsal head.  Admitted with acute wound cellulitis status post right partial fifth ray resection with assumed surgical cure of osteomyelitis.  There was yellow viscous fluid within the metatarsal phalangeal joint consistent with infection.  On dressing change 5/19 patient had ongoing green-colored drainage concerning for ongoing soft tissue infection.  Patient will need further right foot washout.  OR cx from 5/16 grew Serratia, Enterococcus faecalis and Pseudomonas.  Patient has no major contraindication to fluoroquinolone; Qtc 446, no aortic aneurysms on CT.  Per podiatry a TMA was recommended however patient wanted to save as much as possible of his foot.  Patient was taken back to the OR on 5/23 with further I&D and right partial fourth ray resection and further resection of fifth metatarsal stump.  Both wounds were noted to be hard, viable with bleeding at surgery.  No purulence were encountered.  With absence of residual osteomyelitis, patient does not need long-term IV antibiotic.  - Continue IV Zosyn   - Treat x 7-day postop, through 5/30  - At discharge,  transition to PO Amoxicillin 500 mg every 8 hours (for Enterococcus) plus PO Levofloxacin 750 mg daily (for Pseudomonas and Serratia)  - VAC/wound care per podiatry  Osteomyelitis ([facility])  Of fifth metatarsal head in setting of diabetic foot ulcer.  Now status post partial fifth ray resection with presumed surgical cure on 5/13.  Patient is status post repeat washout and resection of fifth metatarsal stump, with partial fourth ray resection, with residual bones hard, viable and with bleeding, all are signs of absence of residual osteomyelitis.  As seen above, patient does not need long-term antibiotic.  - Antibiotic plan as above  - Wound care per podiatry  Severe peripheral arterial disease ([facility])  Status post vascular surgery intervention on 5/13 with right common femoral endarterectomy with bovine pericardial patch angioplasty, right SFA stenting, right angioplasty.  Type 2 diabetes mellitus with complication, without long-term current use of insulin ([facility])  Lab Results   Component Value Date    HGBA1C 9.3 (H) 04/09/2025   Significant risk factor for infection and poor wound healing.  -Tight glycemic control as per primary team  PAF (paroxysmal atrial fibrillation) ([facility])  On Eliquis at home.  Chronic heart failure with preserved ejection fraction (HFpEF) ([facility])  Cardiology following.    Discussed with patient in detail regarding the above plan.      Antibiotics:  Zosyn     Subjective   Patient has pain in his right foot, but controlled.  Temperature stays down.  No chills.  He is tolerating antibiotic well.  No nausea, vomiting or diarrhea.    Objective :  Temp:  [97.4 °F (36.3 °C)-98.5 °F (36.9 °C)] 97.6 °F (36.4 °C)  HR:  [68-86] 76  BP: (120-137)/(62-74) 123/68  Resp:  [16-19] 18  SpO2:  [94 %-100 %] 96 %  O2 Device: None (Room air)  Nasal Cannula O2 Flow Rate (L/min):  [2 L/min] 2 L/min    Physical Exam:     General: Awake, alert, cooperative, no distress.   Neck:  Supple. No mass.  No  lymphadenopathy.   Lungs: Expansion symmetric, no rales, no wheezing, respirations unlabored.   Heart:  Regular rate and rhythm, S1 and S2 normal, no murmur.   Abdomen: Soft, nondistended, non-tender, bowel sounds active all four quadrants, no masses, no organomegaly.   Extremities: No edema.  Right foot with VAC.  No purulence.  No erythema.  Mild tenderness.   Skin:  No rash.   Neuro: Moves all extremities.        Lab Results: I have reviewed the following results:  Results from last 7 days   Lab Units 05/24/25  0459 05/23/25  1602 05/23/25  0503 05/22/25  0529   WBC Thousand/uL 10.47*  --  7.13 7.61   HEMOGLOBIN g/dL 8.5* 9.7* 8.8* 9.0*   PLATELETS Thousands/uL 588*  --  570* 568*     Results from last 7 days   Lab Units 05/24/25  0459 05/23/25  0503 05/22/25  0529   SODIUM mmol/L 133* 134* 135   POTASSIUM mmol/L 4.3 4.2 3.9   CHLORIDE mmol/L 103 104 102   CO2 mmol/L 24 24 26   BUN mg/dL 10 11 14   CREATININE mg/dL 0.88 0.94 0.89   EGFR ml/min/1.73sq m 81 76 81   CALCIUM mg/dL 8.2* 7.8* 8.0*

## 2025-05-24 NOTE — ASSESSMENT & PLAN NOTE
Patient developed acute onset of dyspnea evening of 5/18 after blood transfusion  EKG with transient lateral ST depression  Troponins elevated 163-> 155-> 193-> 237  1/25 nuclear stress test: Ejection fraction 65%.  No evidence of ischemia  Appreciate Cardiology eval: elevated trop due to volume overload  On review of CT scan patient did have quite significant coronary calcifications  Continue medical therapy with Plavix, statin, and beta-blocker  Postoperative EKG without additional abnormalities  Continue medical therapy, appreciate cardiology evaluation and recommendations

## 2025-05-24 NOTE — CASE MANAGEMENT
Case Management Discharge Planning Note    Patient name Gold Van  Location [place] [address] /[facility] 2 M* MRN [id number]  : 1945 Date 2025       Current Admission Date: 2025  Current Admission Diagnosis:Sepsis without acute organ dysfunction ([facility])   Patient Active Problem List    Diagnosis Date Noted    Osteomyelitis ([facility]) 2025    Troponin I above reference range 2025    Volume overload 2025    Postoperative anemia 2025    Pulmonary hypertension ([facility]) 2025    Sepsis without acute organ dysfunction ([facility]) 2025    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 ([facility]) 2025    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 ([facility]) 2025    PAF (paroxysmal atrial fibrillation) ([facility]) 2025    Chronic heart failure with preserved ejection fraction (HFpEF) ([facility]) 2025    Ulcer of right foot with fat layer exposed secondary to osteomyelitis 2025    Severe peripheral arterial disease ([facility]) 2024    Moderate protein-calorie malnutrition ([facility]) 10/09/2024    Gastroesophageal reflux disease without esophagitis 10/04/2024    Impaired mobility and activities of daily living 10/04/2024    Neuropathy 10/04/2024    Foot drop, left 10/04/2024    Abnormal echocardiogram 10/03/2024    CVA (cerebrovascular accident) ([facility]) 10/02/2024    Dysmetria 10/01/2024    COVID-19 2024    Acute respiratory insufficiency 2024    Shortness of breath 2024    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    History of pneumothorax 2024    Acute respiratory failure with hypoxia ([facility]) 2024    Non-recurrent bilateral inguinal hernia without obstruction or gangrene 2024    Pruritus 2024    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 2021    Hyponatremia 2021    Lung nodule 2021    Type 2 diabetes mellitus with complication, without long-term current use of insulin  ([facility]) 10/23/2017    Essential hypertension 10/23/2017    Mild intermittent asthma without complication 10/23/2017    Mixed hyperlipidemia 10/23/2017      LOS (days): 13  Geometric Mean LOS (GMLOS) (days): 9.6  Days to GMLOS:-3     OBJECTIVE:  Risk of Unplanned Readmission Score: 34.58         Current admission status: Inpatient   Preferred Pharmacy:   [facility] - [place], [place] - [address]  [address] [facility] [address]  Phone: [phone] Fax: [phone]    [facility] - [place], [place] - [address]  [address]  [facility] [address]  Phone: [phone] Fax: [phone]    [facility] ([facility]) #[id number] [place], [place] - [address] [address]  Phone: [phone] Fax: [phone]    Primary Care Provider: Shayne Diaz MD    Primary Insurance: [employer]  Secondary Insurance:     DISCHARGE DETAILS:     CM spoke with patient, daughter and ROYA at bedside to facilitate discharge planning. CM provided family with updated choice list. Family has researched facilities and is not agreeable to any choices on list at this time. [facility] to [facility]; [facility]. Family is favorable to [facility] (they didn't have a bed last week). CM reopened referral in [facility], sent a message to facility and [facility] for  w/ CB#. CM also reopened [facility] referral via [facility] as a potential other option. Family provided with updated choice lists. CM department to continue to follow.

## 2025-05-24 NOTE — PROGRESS NOTES
Progress Note - Hospitalist   Name: Gold Van 79 y.o. male I MRN: [id number]  Unit/Bed#: [place] [address] -01 I Date of Admission: 5/11/2025   Date of Service: 5/24/2025 I Hospital Day: 13    Assessment & Plan  Sepsis without acute organ dysfunction (HCC)  Patient is a 79-year-old male with past medical history significant for DM2, PAF, asthma, and PAD who presented with right foot wound  Met sepsis criteria at [facility] initially  Diagnosed with right fifth metatarsal osteomyelitis with right foot cellulitis, started on abx and transferred here for vascular surgery  S/p vascular surgery and right fifth ray amputation 5/16  Status post fourth ray resection 5/20  intraoperative cultures: Serratia/Enterococcus and pseudomonas  Blood culture negative x 2  Appreciate evaluation and recommendations from infectious disease team: Continue antibiotics with Zosyn  Anticipate return to the OR today (was postponed yesterday)  Per ID: If surgical cure anticipated, recommend 5-7 days postop antibiotic  Troponin I above reference range  Patient developed acute onset of dyspnea evening of 5/18 after blood transfusion  EKG with transient lateral ST depression  Troponins elevated 163-> 155-> 193-> 237  1/25 nuclear stress test: Ejection fraction 65%.  No evidence of ischemia  Appreciate Cardiology eval: elevated trop due to volume overload  On review of CT scan patient did have quite significant coronary calcifications  Continue medical therapy with Plavix, statin, and beta-blocker  Postoperative EKG without additional abnormalities  Continue medical therapy, appreciate cardiology evaluation and recommendations  Volume overload  Patient had episode of dyspnea after blood transfusion   Most likely volume overload secondary to blood transfusions, IV fluids/antibiotics, in the setting of chronic hpf EF  most recent echocardiogram 10/24: Left ventricular ejection fraction 50%.  Grade 1 diastolic dysfunction.  Mild hypokinesis of  the posterior wall  Chest x-ray: Moderate interstitial edema  Patient was placed on diuresis with IV Lasix with improvement  Appreciate cardiology recommendations: Continue to monitor off diuretics, euvolemic  Severe peripheral arterial disease (HCC)  Transferred here for vascular surgery intervention  Underwent right CFA SFA endarterectomy/stenting 5/13/2025  Aspirin discontinued.  Started on clopidogrel for stents  Continue statin  Patient is also on Eliquis for paroxysmal atrial fibrillation  Discussed with vascular surgery team: No additional intervention required from their standpoint they will see in office follow-up  Osteomyelitis (HCC)  Right fifth metatarsal ulceration with underlying osteomyelitis and surrounding right foot cellulitis  Treated with cefazolin/Flagyl empirically  5/16: Underwent right partial fifth ray resection  5/20 right partial fourth ray amputation, wound debridement  cultures with Serratia/Enterococcus, and Pseudomonas  Antibiotics changed to IV Zosyn  ID consult appreciated: If anticipated surgical cure recommend 5-7 days antibiotics postintervention  Postoperative anemia  Prior baseline hemoglobin appears to range 10-12  Postprocedure hemoglobin decreased to 7.0 when patient was symptomatic: Secondary to expected procedural blood loss  On 5/18 transfused 1 unit PRBC   Repeat hemoglobin 9's  Hemoglobin stable: 9.0-8.8-9.7-8.5: Stable within the lab margin of error  Continue to monitor    Results from last 7 days   Lab Units 05/24/25  0459 05/23/25  1602 05/23/25  0503 05/22/25  0529   HEMOGLOBIN g/dL 8.5* 9.7* 8.8* 9.0*   MCV fL 94  --  91 92     Type 2 diabetes mellitus with complication, without long-term current use of insulin ([facility])  Lab Results   Component Value Date    HGBA1C 9.3 (H) 04/09/2025     Recent Labs     05/23/25  1513 05/23/25  1606 05/23/25  2052 05/24/25  0725   POCGLU 191* 188* 209* 230*     Prior to admission meds: glipizide, linagliptin and metformin  A1c 9.3.   "Endocrinology consulted by vascular surgery  Oral agents on hold preoperatively  Accu-Cheks and sliding scale insulin  PAF (paroxysmal atrial fibrillation) ([facility])  Follows with [facility] cardiology: Most recent note 4/25 was reviewed  Continue atenolol 25 mg daily.  Anticoagulation: Apixaban   Continue beta-blocker (changed hold parameters due to episode of rapid heart rate when beta-blocker was held)  Currently in sinus rhythm  Essential hypertension  Continue atenolol 25 mg daily and lisinopril 20 mg twice daily  Home amlodipine on hold due to low-normal blood pressures  Blood pressure adequately controlled  Continue current regimen   Mild intermittent asthma without complication  Prior to admission on [facility].  No exacerbation  Albuterol metered-dose inhaler as needed  Pruritus  Chronic pruritus follows with dermatology as an outpatient on prednisone 5 mg daily  Chronic heart failure with preserved ejection fraction (HFpEF) ([facility])  Last known LVEF 50% echocardiogram 2024  Patient being treated for volume overload secondary to blood transfusion/IV fluids as described above  Lung nodule  Chest x-ray with incidental finding of \"nodular opacity at left base\"  Outpatient chest x-ray in 6 weeks    VTE Pharmacologic Prophylaxis: VTE Score: 5 High Risk (Score >/= 5) - Pharmacological DVT Prophylaxis Ordered: apixaban (Eliquis). Sequential Compression Devices Ordered.    Mobility:   Basic Mobility Inpatient Raw Score: 10  JH-HLM Goal: 4: Move to chair/commode  JH-HLM Achieved: 4: Move to chair/commode  JH-HLM Goal achieved. Continue to encourage appropriate mobility.    Patient Centered Rounds: I performed bedside rounds with nursing staff today.   Discussions with Specialists or Other Care Team Provider: CM    Education and Discussions with Family / Patient: called wife and gave update    Current Length of Stay: 13 day(s)  Current Patient Status: Inpatient   Certification Statement: The patient will continue to require " additional inpatient hospital stay due to post op care  Discharge Plan: Anticipate discharge in >72 hrs to rehab facility.    Code Status: Level 1 - Full Code    Subjective   Pt denies any pain now.  Notes he had severe pain last night, but improved after morphine IV.  Denies any pain this am in his foot.  Denies any pain anywhere else.  Denies any headache.  Denies any chest pain.  Denies any back pain.  Denies any abdominal pain.  Denies any shortness of breath or cough.  Denies any palpitations or heart racing.  Denies any nausea, vomiting, diarrhea.  Is tolerating p.o.  Denies any dizziness or lightheadedness    Objective :  Temp:  [97.4 °F (36.3 °C)-98.5 °F (36.9 °C)] 98.5 °F (36.9 °C)  HR:  [68-86] 86  BP: (120-137)/(62-74) 123/72  Resp:  [16-19] 19  SpO2:  [94 %-100 %] 97 %  O2 Device: None (Room air)  Nasal Cannula O2 Flow Rate (L/min):  [2 L/min] 2 L/min    Body mass index is 23.92 kg/m².     Input and Output Summary (last 24 hours):     Intake/Output Summary (Last 24 hours) at 5/24/2025 1133  Last data filed at 5/24/2025 1035  Gross per 24 hour   Intake 780 ml   Output 900 ml   Net -120 ml       Physical Exam  General: Very pleasant male.  No acute distress.  Nontachypneic and nondyspneic  Heart: Regular rate and rhythm.  S1-S2 present.  No murmur, rub, gallop  Lungs: Decreased breath sounds at both bases however otherwise clear to auscultation.  No wheezes, crackles, rhonchi.  No accessory muscle use or respiratory distress.  Abdomen: Soft, nontender with palpation.  Nondistended.  Normoactive bowel sounds present.  No guarding or rebound.  No peritoneal signs or mass  Neurologic: Awake and alert.  Oriented.  Interactive no somnolence or lethargy  Extremities: Right lower extremity dressing and Ace wrap in place    Lines/Drains:        Telemetry:  Telemetry Orders (From admission, onward)               24 Hour Telemetry Monitoring  Continuous x 24 Hours (Telem)        Expiring   Question:  Reason for 24  Hour Telemetry  Answer:  Arrhythmias requiring acute medical intervention / PPM or ICD malfunction                     Telemetry Reviewed: Normal Sinus Rhythm  Indication for Continued Telemetry Use: Arrthymias requiring medical therapy               Lab Results: I have reviewed the following results:   Results from last 7 days   Lab Units 25  0459   WBC Thousand/uL 10.47*   HEMOGLOBIN g/dL 8.5*   HEMATOCRIT % 26.7*   PLATELETS Thousands/uL 588*   SEGS PCT % 73   LYMPHO PCT % 14   MONO PCT % 10   EOS PCT % 1     Results from last 7 days   Lab Units 25  0459   SODIUM mmol/L 133*   POTASSIUM mmol/L 4.3   CHLORIDE mmol/L 103   CO2 mmol/L 24   BUN mg/dL 10   CREATININE mg/dL 0.88   ANION GAP mmol/L 6   CALCIUM mg/dL 8.2*   GLUCOSE RANDOM mg/dL 227*         Results from last 7 days   Lab Units 25  0725 25  2052 25  1606 25  1513 25  1111 25  0738 25  2029 25  1622 25  1116 25  0723 25  2111 25  1637   POC GLUCOSE mg/dl 230* 209* 188* 191* 184* 170* 254* 360* 198* 225* 283* 318*               Recent Cultures (last 7 days):         ==========================================  Imagin/18 chest x-ray  moderate interstitial edema.  Nodular opacity at the left base, not visible on prior studies. Recommend follow-up with a chest radiograph with dual energy subtraction in 6 weeks to assure resolution      right foot x-rayPostop right foot.  No acute osseous abnormality.      vein mapping  RIGHT LOWER LIMB:   The great saphenous vein is patent  from the groin to the ankle.   The intraluminal diameter measurements range from 2.2 mm to 12.6 mm throughout.   LEFT LOWER LIMB:   The great saphenous vein is patent  from the groin to the ankle.   The intraluminal diameter measurements range from 2.1 mm to 9 mm throughout.       right foot x-ray: Ulceration along the lateral aspect of the foot and soft tissue swelling along the dorsum of  the foot without radiographic findings of osteomyelitis.      5/7 venous duplex  RIGHT LOWER LIMB   No evidence of acute or chronic deep vein thrombosis.   No evidence of superficial thrombophlebitis noted.   Doppler evaluation shows a normal response to augmentation maneuvers.   Popliteal, posterior tibial and anterior tibial arterial Doppler waveforms are monophasic (Known PAD).   Note: There is a well-defined hypoechoic non-vascularized cystic-type structure noted in the popliteal fossa.      LEFT LOWER LIMB LIMITED   Evaluation shows no evidence of thrombus in the common femoral vein.    Doppler evaluation shows a normal response to augmentation maneuvers.     5/6 CTA abdomen with runoff  1. Focal high-grade stenosis of distal right CFA and diffuse atherosclerotic disease of right SFA resulting in moderate to severe stenoses with focal near complete occlusions at the proximal and distal segments.  2. Short segment occlusions of proximal right anterior tibial and peroneal arteries with reconstitution in the medial to distal segments. Right posterior tibial artery is occluded.  3. Occluded left SFA with reconstitution in the distal segment. Left anterior tibial and posterior tibial arteries are occluded. One-vessel runoff of the left lower extremity through the peroneal artery.  4. Focal high-grade stenosis at the proximal right renal artery.        Microbiology  5/16 anaerobic culture: Negative  5/16 wound culture: 3+ Serratia.  3+ Enterococcus faecalis,pseudomonas.  5/7 blood culture: Negative x 2        =============================================    Last 24 Hours Medication List:     Current Facility-Administered Medications:     acetaminophen (TYLENOL) tablet 975 mg, Q8H SANDRA    albuterol (PROVENTIL HFA,VENTOLIN HFA) inhaler 1 puff, Q4H PRN    [Held by provider] amLODIPine (NORVASC) tablet 5 mg, BID    apixaban (ELIQUIS) tablet 5 mg, BID    atenolol (TENORMIN) tablet 25 mg, Daily    atorvastatin (LIPITOR)  tablet 40 mg, QPM    Budeson-Glycopyrrol-Formoterol 160-9-4.8 MCG/ACT AERO 2 puff, BID    clopidogrel (PLAVIX) tablet 75 mg, Daily    docusate sodium (COLACE) capsule 100 mg, BID    famotidine (PEPCID) tablet 20 mg, BID    [Held by provider] furosemide (LASIX) injection 40 mg, BID (diuretic)    [Held by provider] glipiZIDE (GLUCOTROL XL) 24 hr tablet 10 mg, BID AC    insulin lispro (HumALOG/ADMELOG) 100 units/mL subcutaneous injection 1-5 Units, TID AC **AND** Fingerstick Glucose (POCT), TID AC    insulin lispro (HumALOG/ADMELOG) 100 units/mL subcutaneous injection 1-5 Units, HS    levalbuterol (XOPENEX) inhalation solution 1.25 mg, Q6H PRN    lidocaine (LIDODERM) 5 % patch 1 patch, Daily    lisinopril (ZESTRIL) tablet 20 mg, BID    [Held by provider] metFORMIN (GLUCOPHAGE-XR) 24 hr tablet 500 mg, Daily With Dinner    metoprolol (LOPRESSOR) injection 5 mg, Q6H PRN    montelukast (SINGULAIR) tablet 10 mg, HS    morphine injection 2 mg, Q4H PRN    multivitamin-minerals (CENTRUM) tablet 1 tablet, Daily    ondansetron (ZOFRAN) injection 4 mg, Q6H PRN    oxyCODONE (ROXICODONE) IR tablet 5 mg, Q4H PRN **OR** oxyCODONE (ROXICODONE) immediate release tablet 10 mg, Q4H PRN    [COMPLETED] piperacillin-tazobactam (ZOSYN) 4.5 g in sodium chloride 0.9 % 100 mL IV LOADING DOSE, Once, Last Rate: Stopped (05/20/25 1245) **FOLLOWED BY** piperacillin-tazobactam (ZOSYN) 4.5 g in sodium chloride 0.9 % 100 mL IVPB (EXTENDED INFUSION), Q8H, Last Rate: 4.5 g (05/24/25 0608)    polyethylene glycol (MIRALAX) packet 17 g, Daily PRN    predniSONE tablet 5 mg, Daily    senna (SENOKOT) tablet 8.6 mg, Daily    Administrative Statements   Today, Patient Was Seen By: Zulema Pitt MD      **Please Note: This note may have been constructed using a voice recognition system.**

## 2025-05-24 NOTE — ASSESSMENT & PLAN NOTE
- met SIRS criteria at [facility] with 05/07/25 with tachycardia and leukocytosis   - 5/16: Underwent right partial fifth ray resection: presumed surgical cure  - s/p right CFA SFA endarterectomy/stenting 5/13/2025   - currently on Plavix 75 mg daily (podiatry and vascular following)

## 2025-05-24 NOTE — PROGRESS NOTES
Progress Note - Cardiology   Name: Gold Van 79 y.o. male I MRN: [id number]  Unit/Bed#: [place] [address] -01 I Date of Admission: 5/11/2025   Date of Service: 5/24/2025 I Hospital Day: 13    Assessment & Plan  Chronic heart failure with preserved ejection fraction (HFpEF) ([facility])  TTE 10/1/2024: EF 50%, grade 1 diastolic dysfunction, mild AR  Not on any diuretics at baseline, examines euvolemic at this time  Sepsis without acute organ dysfunction ([facility])  Ulcer of right foot with fat layer exposed secondary to osteomyelitis  Severe peripheral arterial disease (HCC)  - met SIRS criteria at [facility] with 05/07/25 with tachycardia and leukocytosis   - 5/16: Underwent right partial fifth ray resection: presumed surgical cure  - s/p right CFA SFA endarterectomy/stenting 5/13/2025   - currently on Plavix 75 mg daily (podiatry and vascular following)  Essential hypertension  - BP overall stable during admission   - home rx: amlodipine 5 mg daily, lisinopril 20 mg daily, atenolol 25 mg daily   PAF (paroxysmal atrial fibrillation) ([facility])  - Zio 01/06/25: predominant NSR with 1 NSVT lasting 5 beats, 2% AF/flutter burden  - rate control: atenolol 25 mg daily   - AC: Eliquis 5 mg BID  Troponin I above reference range  - troponin trend: 163 > 155 > 193 > 237>100>91>92  - EKG shows nonspecific ST and T wave abnormalities  Non-MI related troponin elevation in the setting of anemia  Patient denies chest pain, chest pressure,or other anginal equivalents  Follows with [facility] cardiology outpatient -last appointment 4/23/2025  Pharmacologic stress test on 1/9/2025 showing no diagnostic evidence of ischemia or perfusion deficits.  Patient active preoperatively riding a stationary bike with no reduction in exercise tolerance or anginal symptoms.  Type 2 diabetes mellitus with complication, without long-term current use of insulin ([facility])  Lab Results   Component Value Date    HGBA1C 9.3 (H) 04/09/2025   Continue  Postoperative  anemia  - baseline ~ 10-12 with drop to 7 after surgery   - s/p 1 unit of PRBCs 05/18/25   Mild intermittent asthma without complication    Plan  No further cardiac workup necessary  Patient should follow-up with his primary cardiologist -appointment scheduled for October Continue all outpatient cardiovascular medications  Will sign off please contact with any further questions    Subjective   Patient with c/o right lower extremity discomfort     Objective :  Temp:  [97.4 °F (36.3 °C)-98.5 °F (36.9 °C)] 98.5 °F (36.9 °C)  HR:  [68-86] 86  BP: (120-137)/(62-74) 123/72  Resp:  [16-19] 19  SpO2:  [94 %-100 %] 97 %  O2 Device: None (Room air)  Nasal Cannula O2 Flow Rate (L/min):  [2 L/min] 2 L/min  Orthostatic Blood Pressures      Flowsheet Row Most Recent Value   Blood Pressure 123/72 filed at 05/24/2025 0725   Patient Position - Orthostatic VS Lying filed at 05/23/2025 1915          First Weight: Weight - Scale: 72.4 kg (159 lb 11.2 oz) (05/12/25 0553)  Vitals:    05/23/25 0540 05/24/25 0600   Weight: 83 kg (182 lb 15.7 oz) 80 kg (176 lb 5.9 oz)     Physical Exam  Constitutional:       Appearance: Normal appearance.   HENT:      Head: Normocephalic and atraumatic.      Nose: Nose normal.      Mouth/Throat:      Mouth: Mucous membranes are dry.     Cardiovascular:      Rate and Rhythm: Normal rate and regular rhythm.      Pulses: Normal pulses.      Heart sounds: Normal heart sounds.   Pulmonary:      Effort: Pulmonary effort is normal.      Breath sounds: Normal breath sounds.   Abdominal:      Palpations: Abdomen is soft.     Musculoskeletal:         General: Normal range of motion.      Cervical back: Normal range of motion.     Skin:     General: Skin is warm.      Capillary Refill: Capillary refill takes less than 2 seconds.     Neurological:      General: No focal deficit present.      Mental Status: He is alert and oriented to person, place, and time. Mental status is at baseline.         Lab Results: I have  reviewed the following results:  Results from last 7 days   Lab Units 05/24/25  0459 05/23/25  1602 05/23/25  0503 05/22/25  0529   WBC Thousand/uL 10.47*  --  7.13 7.61   HEMOGLOBIN g/dL 8.5* 9.7* 8.8* 9.0*   HEMATOCRIT % 26.7* 29.8* 27.7* 27.9*   PLATELETS Thousands/uL 588*  --  570* 568*     Results from last 7 days   Lab Units 05/24/25  0459 05/23/25  0503 05/22/25  0529   POTASSIUM mmol/L 4.3 4.2 3.9   CHLORIDE mmol/L 103 104 102   CO2 mmol/L 24 24 26   BUN mg/dL 10 11 14   CREATININE mg/dL 0.88 0.94 0.89   CALCIUM mg/dL 8.2* 7.8* 8.0*         Lab Results   Component Value Date    HGBA1C 9.3 (H) 04/09/2025     Lab Results   Component Value Date    CKTOTAL 292 09/26/2024    TROPONINI <0.03 03/24/2021       Imaging Results Review: No pertinent imaging studies reviewed.  Other Study Results Review: No additional pertinent studies reviewed.    VTE Pharmacologic Prophylaxis: VTE covered by:  apixaban, Oral, 5 mg at 05/24/25 0855     VTE Mechanical Prophylaxis: sequential compression device

## 2025-05-24 NOTE — PROGRESS NOTES
Progress Note - Vascular Surgery   Name: Gold Van 79 y.o. 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 Pt admitted and started on ABX. Pt was transferred to [facility] on 5/11/25 for vascular surgical intervention.     Diagnostics:  -5/6/25 CTA abd w/ runoff: B/L EDY/EIA/IIA patent w/ atherosclerosis. Right: Focal high-grade stenosis of distal R CFA and diffuse atherosclerotic disease of R SFA resulting in moderate to severe stenoses with focal near complete occlusions at the proximal and distal segments. Short segment occlusions of proximal R AT and peroneal arteries with reconstitution. R PTA is occluded. Left: Occluded L SFA with reconstitution. L YUNG and PTA occluded. One-vessel runoff LLE through peroneal artery.  -4/18/25 LEAD: Right: >75% prox SFA and 50-75% dSFA stenosis. Distal PTA occlusion. R RIC: 0.42/15/16. Left: PTA and YUNG occlusions. L RIC: 1.06/85/45.   -4/17/25 MRI R foot: Possible early OM in R 5th met    Plan:  -R 5th met wound (+) OM in the setting of PAD now POD #11 R CFA/SFA endarterectomy w/ bovine pericardial patch, DCB and stenting of SFA, and angio of AT  -Continue daily incision care to R groin by nursing   -Continue plavix (new SFA stents) and lipitor for medical management of PAD   -Continue eliquis for hx of afib  -Podiatry following, now POD #1 right partial 4th ray resection and further resection of 5th metatarsal stump 5/23/2025  -Adequate bleeding noted intraoperatively  -Intra-op cx from 5/16 (+) for serratia, enterococcus faecalis and pseudomonas   -ID following for ABX  management; input appreciated  -Cardiology following for CHF management; input appreciated   -Continue medical management per primary team  -Case management following w/ plans for STR  -Pt is stable for discharge from a vascular surgery standpoint, vascular will signoff  -Plan follow up outpt in vascular surgery office; appt scheduled for 6/5/25  -D/w Dr. Aiken    24 Hour Events : Right 4th ray partial resection and further resection of 5th metatarsal stump 5/23/2025.  Subjective :   Patient is resting comfortably in bed at this time, denying any significant discomfort.  Patient does verbalize frustration regarding prolonged hospitalization.    Objective :  Temp:  [97.4 °F (36.3 °C)-98.5 °F (36.9 °C)] 98.5 °F (36.9 °C)  HR:  [68-86] 86  BP: (120-137)/(62-74) 123/72  Resp:  [16-19] 19  SpO2:  [94 %-100 %] 97 %  O2 Device: None (Room air)  Nasal Cannula O2 Flow Rate (L/min):  [2 L/min] 2 L/min     I/O         05/22 0701  05/23 0700 05/23 0701  05/24 0700 05/24 0701  05/25 0700    P.O. 750 420 120    I.V. (mL/kg)  300 (3.8)     Total Intake(mL/kg) 750 (9) 720 (9) 120 (1.5)    Urine (mL/kg/hr) 675 (0.3) 1100 (0.6)     Stool 0      Total Output 675 1100     Net +75 -380 +120           Unmeasured Stool Occurrence 2 x              Physical Exam  Vitals and nursing note reviewed.   Constitutional:       General: He is awake. He is not in acute distress.     Appearance: Normal appearance. He is well-developed.   HENT:      Head: Normocephalic and atraumatic.     Cardiovascular:      Rate and Rhythm: Normal rate and regular rhythm.      Pulses:           Dorsalis pedis pulses are detected w/ Doppler on the right side and detected w/ Doppler on the left side.        Posterior tibial pulses are detected w/ Doppler on the right side and detected w/ Doppler on the left side.      Heart sounds: Normal heart sounds.      Comments: Multiphasic R DP/PT signals.  Pulmonary:      Effort: Pulmonary effort is normal. No respiratory  "distress.   Abdominal:      General: There is no distension.      Palpations: Abdomen is soft.     Musculoskeletal:         General: No swelling.      Cervical back: Neck supple.      Right lower leg: Edema present.     Skin:     General: Skin is warm and dry.      Capillary Refill: Capillary refill takes less than 2 seconds.      Findings: Wound present.      Comments: Right groin incision is well-approximated without erythema or drainage  Right foot post operative dressing     Neurological:      General: No focal deficit present.      Mental Status: He is alert and oriented to person, place, and time. Mental status is at baseline.     Psychiatric:         Mood and Affect: Mood is depressed.         Speech: Speech normal.         Behavior: Behavior normal. Behavior is cooperative.           Lab Results: I have reviewed the following results:  CBC with diff:   Lab Results   Component Value Date    WBC 10.47 (H) 05/24/2025    HGB 8.5 (L) 05/24/2025    HCT 26.7 (L) 05/24/2025    MCV 94 05/24/2025     (H) 05/24/2025    RBC 2.85 (L) 05/24/2025    MCH 29.8 05/24/2025    MCHC 31.8 05/24/2025    RDW 15.8 (H) 05/24/2025    MPV 8.9 05/24/2025    NRBC 0 05/24/2025   ,   BMP/CMP:  Lab Results   Component Value Date    SODIUM 133 (L) 05/24/2025    K 4.3 05/24/2025    K 4.5 04/14/2015     05/24/2025     04/14/2015    CO2 24 05/24/2025    CO2 22 05/13/2025    ANIONGAP 8 04/14/2015    BUN 10 05/24/2025    BUN 10 04/14/2015    CREATININE 0.88 05/24/2025    CREATININE 0.86 04/14/2015    GLUCOSE 262 (H) 05/13/2025    GLUCOSE 179 (H) 04/14/2015    CALCIUM 8.2 (L) 05/24/2025    CALCIUM 8.9 04/14/2015    AST 12 (L) 05/16/2025    AST 12 08/12/2014    ALT 4 (L) 05/16/2025    ALT 19 08/12/2014    ALKPHOS 51 05/16/2025    ALKPHOS 101 08/12/2014    PROT 6.8 08/12/2014    BILITOT 0.6 08/12/2014    EGFR 81 05/24/2025   ,   Lipid Panel: No results found for: \"CHOL\",   Coags:   Lab Results   Component Value Date    PTT 53 (H) " 05/15/2025    INR 1.14 05/07/2025   ,   Blood Culture:   Lab Results   Component Value Date    BLOODCX No Growth After 5 Days. 05/07/2025    BLOODCX No Growth After 5 Days. 05/07/2025   ,   Urinalysis:   Lab Results   Component Value Date    COLORU Yellow 05/07/2025    COLORU Yellow 08/12/2014    CLARITYU Clear 05/07/2025    CLARITYU Clear 08/12/2014    SPECGRAV 1.010 05/07/2025    SPECGRAV 1.025 08/12/2014    PHUR 6.0 05/07/2025    PHUR 6.0 06/14/2016    PHUR 6.0 08/12/2014    LEUKOCYTESUR Negative 05/07/2025    LEUKOCYTESUR Negative 08/12/2014    NITRITE Negative 05/07/2025    NITRITE Negative 08/12/2014    PROTEINUA Negative 08/12/2014    GLUCOSEU Negative 05/07/2025    GLUCOSEU 100 (1/10%) (A) 08/12/2014    KETONESU Negative 05/07/2025    KETONESU Negative 08/12/2014    BILIRUBINUR Negative 05/07/2025    BILIRUBINUR Negative 08/12/2014    BLOODU 1+ (A) 05/07/2025    BLOODU Trace (A) 08/12/2014   ,   Urine Culture:   Lab Results   Component Value Date    URINECX No Growth <1000 cfu/mL 10/21/2022   ,   Wound Culure:   Lab Results   Component Value Date    WOUNDCULT 3+ Growth of Serratia marcescens (A) 05/16/2025    WOUNDCULT 3+ Growth of Enterococcus faecalis (A) 05/16/2025    WOUNDCULT 1+ Growth of Pseudomonas aeruginosa (A) 05/16/2025

## 2025-05-24 NOTE — ASSESSMENT & PLAN NOTE
Prior baseline hemoglobin appears to range 10-12  Postprocedure hemoglobin decreased to 7.0 when patient was symptomatic: Secondary to expected procedural blood loss  On 5/18 transfused 1 unit PRBC   Repeat hemoglobin 9's  Hemoglobin stable: 9.0-8.8-9.7-8.5: Stable within the lab margin of error  Continue to monitor    Results from last 7 days   Lab Units 05/24/25  0459 05/23/25  1602 05/23/25  0503 05/22/25  0529   HEMOGLOBIN g/dL 8.5* 9.7* 8.8* 9.0*   MCV fL 94  --  91 92

## 2025-05-24 NOTE — PLAN OF CARE
Problem: PAIN - ADULT  Goal: Verbalizes/displays adequate comfort level or baseline comfort level  Description: Interventions:- Encourage patient to monitor pain and request assistance- Assess pain using appropriate pain scale- Administer analgesics based on type and severity of pain and evaluate response- Implement non-pharmacological measures as appropriate and evaluate response- Consider cultural and social influences on pain and pain management- Notify physician/advanced practitioner if interventions unsuccessful or patient reports new pain  Outcome: Progressing     Problem: DISCHARGE PLANNING  Goal: Discharge to home or other facility with appropriate resources  Description: INTERVENTIONS:- Identify barriers to discharge w/patient and caregiver- Arrange for needed discharge resources and transportation as appropriate- Identify discharge learning needs (meds, wound care, etc.)- Arrange for interpretive services to assist at discharge as needed- Refer to Case Management Department for coordinating discharge planning if the patient needs post-hospital services based on physician/advanced practitioner order or complex needs related to functional status, cognitive ability, or social support system  Outcome: Progressing     Problem: INFECTION - ADULT  Goal: Absence or prevention of progression during hospitalization  Description: INTERVENTIONS:  - Assess and monitor for signs and symptoms of infection  - Monitor lab/diagnostic results  - Monitor all insertion sites, i.e. indwelling lines, tubes, and drains  - Monitor endotracheal if appropriate and nasal secretions for changes in amount and color  - [place] appropriate cooling/warming therapies per order  - Administer medications as ordered  - Instruct and encourage patient and family to use good hand hygiene technique  - Identify and instruct in appropriate isolation precautions for identified infection/condition  Outcome: Progressing  Goal: Absence of  fever/infection during neutropenic period  Description: INTERVENTIONS:  - Monitor WBC    Outcome: Progressing     Problem: SAFETY ADULT  Goal: Patient will remain free of falls  Description: INTERVENTIONS:  - Educate patient/family on patient safety including physical limitations  - Instruct patient to call for assistance with activity   - Consult OT/PT to assist with strengthening/mobility   - Keep Call bell within reach  - Keep bed low and locked with side rails adjusted as appropriate  - Keep care items and personal belongings within reach  - Initiate and maintain comfort rounds  - Make Fall Risk Sign visible to staff  - Offer Toileting every 2 Hours, in advance of need  - Initiate/Maintain bed alarm  - Obtain necessary fall risk management equipment:   - Apply yellow socks and bracelet for high fall risk patients  - Consider moving patient to room near nurses station  Outcome: Progressing  Goal: Maintain or return to baseline ADL function  Description: INTERVENTIONS:  -  Assess patient's ability to carry out ADLs; assess patient's baseline for ADL function and identify physical deficits which impact ability to perform ADLs (bathing, care of mouth/teeth, toileting, grooming, dressing, etc.)  - Assess/evaluate cause of self-care deficits   - Assess range of motion  - Assess patient's mobility; develop plan if impaired  - Assess patient's need for assistive devices and provide as appropriate  - Encourage maximum independence but intervene and supervise when necessary  - Involve family in performance of ADLs  - Assess for home care needs following discharge   - Consider OT consult to assist with ADL evaluation and planning for discharge  - Provide patient education as appropriate  Outcome: Progressing  Goal: Maintains/Returns to pre admission functional level  Description: INTERVENTIONS:  - Perform AM-PAC 6 Click Basic Mobility/ Daily Activity assessment daily.  - Set and communicate daily mobility goal to care team  and patient/family/caregiver.   - Collaborate with rehabilitation services on mobility goals if consulted  - Perform Range of Motion 4 times a day.  - Reposition patient every 3 hours.  - Dangle patient 3 times a day  - Stand patient 3 times a day  - Ambulate patient 3 times a day  - Out of bed to chair 3 times a day   - Out of bed for meals 3 times a day  - Out of bed for toileting  - Record patient progress and toleration of activity level   Outcome: Progressing     Problem: Knowledge Deficit  Goal: Patient/family/caregiver demonstrates understanding of disease process, treatment plan, medications, and discharge instructions  Description: Complete learning assessment and assess knowledge base.  Interventions:  - Provide teaching at level of understanding  - Provide teaching via preferred learning methods  Outcome: Progressing     Problem: SKIN/TISSUE INTEGRITY - ADULT  Goal: Skin Integrity remains intact(Skin Breakdown Prevention)  Description: Assess:-Perform Sae assessment -Clean and moisturize skin -Inspect skin when repositioning, toileting, and assisting with ADLS-Assess under medical devices -Assess extremities for adequate circulation and sensation Bed Management:-Have minimal linens on bed & keep smooth, unwrinkled-Change linens as needed when moist or perspiring-Avoid sitting or lying in one position for more than 2 hours while in bed-Keep HOB at 30 degrees Toileting:-Offer bedside commode-Assess for incontinence -Use incontinent care products after each incontinent episode Activity:-Mobilize patient 3 times a day-Encourage activity and walks on unit-Encourage or provide ROM exercises -Turn and reposition patient every 2 Hours-Use appropriate equipment to lift or move patient in bed-Instruct/ Assist with weight shifting  when out of bed in chair-Consider limitation of chair time 2 hour intervalsSkin Care:-Avoid use of baby powder, tape, friction and shearing, hot water or constrictive clothing-Relieve  pressure over bony prominences Do not massage red bony areasNext Steps:-Teach patient strategies to minimize risks Consider consults to  interdisciplinary teams   Outcome: Progressing  Goal: Incision(s), wounds(s) or drain site(s) healing without S/S of infection  Description: INTERVENTIONS- Assess and document dressing, incision, wound bed, drain sites and surrounding tissue- Provide patient and family education- Perform skin care/dressing changes every shift  Outcome: Progressing  Goal: Pressure injury heals and does not worsen  Description: Interventions:- Implement low air loss mattress or specialty surface (Criteria met)- Apply silicone foam dressing- Instruct/assist with weight shifting every 120 minutes when in chair - Limit chair time to 2 hour intervals- Use special pressure reducing interventions such as turning when in chair - Apply fecal or urinary incontinence containment device - Perform passive or active ROM every 2 hours- Turn and reposition patient & offload bony prominences every 2 hours - Utilize friction reducing device or surface for transfers - Consider consults to  interdisciplinary teams such as wound- Use incontinent care products after each incontinent episode such as wipes- Consider nutrition services referral as needed  Outcome: Progressing     Problem: METABOLIC, FLUID AND ELECTROLYTES - ADULT  Goal: Electrolytes maintained within normal limits  Description: INTERVENTIONS:  - Monitor labs and assess patient for signs and symptoms of electrolyte imbalances  - Administer electrolyte replacement as ordered  - Monitor response to electrolyte replacements, including repeat lab results as appropriate  - Instruct patient on fluid and nutrition as appropriate  Outcome: Progressing     Problem: HEMATOLOGIC - ADULT  Goal: Maintains hematologic stability  Description: INTERVENTIONS  - Assess for signs and symptoms of bleeding or hemorrhage  - Monitor labs  - Administer supportive blood  products/factors as ordered and appropriate  Outcome: Progressing     Problem: CARDIOVASCULAR - ADULT  Goal: Maintains optimal cardiac output and hemodynamic stability  Description: INTERVENTIONS:  - Monitor I/O, vital signs and rhythm  - Monitor for S/S and trends of decreased cardiac output  - Administer and titrate ordered vasoactive medications to optimize hemodynamic stability  - Assess quality of pulses, skin color and temperature  - Assess for signs of decreased coronary artery perfusion  - Instruct patient to report change in severity of symptoms  Outcome: Progressing  Goal: Absence of cardiac dysrhythmias or at baseline rhythm  Description: INTERVENTIONS:  - Continuous cardiac monitoring, vital signs, obtain 12 lead EKG if ordered  - Administer antiarrhythmic and heart rate control medications as ordered  - Monitor electrolytes and administer replacement therapy as ordered  Outcome: Progressing     Problem: Nutrition/Hydration-ADULT  Goal: Nutrient/Hydration intake appropriate for improving, restoring or maintaining nutritional needs  Description: Monitor and assess patient's nutrition/hydration status for malnutrition. Collaborate with interdisciplinary team and initiate plan and interventions as ordered.  Monitor patient's weight and dietary intake as ordered or per policy. Utilize nutrition screening tool and intervene as necessary. Determine patient's food preferences and provide high-protein, high-caloric foods as appropriate. INTERVENTIONS:- Monitor oral intake, urinary output, labs, and treatment plans- Assess nutrition and hydration status and recommend course of action- Evaluate amount of meals eaten- Assist patient with eating if necessary - Allow adequate time for meals- Recommend/ encourage appropriate diets, oral nutritional supplements, and vitamin/mineral supplements- Order, calculate, and assess calorie counts as needed- Recommend, monitor, and adjust tube feedings and TPN/PPN based on  assessed needs- Assess need for intravenous fluids- Provide specific nutrition/hydration education as appropriate- Include patient/family/caregiver in decisions related to nutrition  Outcome: Progressing     Problem: Prexisting or High Potential for Compromised Skin Integrity  Goal: Skin integrity is maintained or improved  Description: INTERVENTIONS:  - Identify patients at risk for skin breakdown  - Assess and monitor skin integrity  - Assess and monitor nutrition and hydration status  - Monitor labs   - Assess for incontinence   - Turn and reposition patient  - Assist with mobility/ambulation  - Relieve pressure over bony prominences  - Avoid friction and shearing  - Provide appropriate hygiene as needed including keeping skin clean and dry  - Evaluate need for skin moisturizer/barrier cream  - Collaborate with interdisciplinary team   - Patient/family teaching  - Consider wound care consult   Outcome: Progressing

## 2025-05-24 NOTE — ASSESSMENT & PLAN NOTE
78 yo male ex-smoker w/ a hx of DM II (A1c 9.3), HTN, HLD, asthma, CVA (9/2024), PAF (eliquis), cardiomyopathy and PAD presented to [facility] on 5/7/25 w/ R foot pain x1 week and non-healing R 5th met wound x4-5 weeks. Pt admitted and started on ABX. Pt was transferred to [facility] on 5/11/25 for vascular surgical intervention.     Diagnostics:  -5/6/25 CTA abd w/ runoff: B/L EDY/EIA/IIA patent w/ atherosclerosis. Right: Focal high-grade stenosis of distal R CFA and diffuse atherosclerotic disease of R SFA resulting in moderate to severe stenoses with focal near complete occlusions at the proximal and distal segments. Short segment occlusions of proximal R AT and peroneal arteries with reconstitution. R PTA is occluded. Left: Occluded L SFA with reconstitution. L YUNG and PTA occluded. One-vessel runoff LLE through peroneal artery.  -4/18/25 LEAD: Right: >75% prox SFA and 50-75% dSFA stenosis. Distal PTA occlusion. R RIC: 0.42/15/16. Left: PTA and YUNG occlusions. L RIC: 1.06/85/45.   -4/17/25 MRI R foot: Possible early OM in R 5th met    Plan:  -R 5th met wound (+) OM in the setting of PAD now POD #11 R CFA/SFA endarterectomy w/ bovine pericardial patch, DCB and stenting of SFA, and angio of AT  -Continue daily incision care to R groin by nursing   -Continue plavix (new SFA stents) and lipitor for medical management of PAD   -Continue eliquis for hx of afib  -Podiatry following, now POD #1 right partial 4th ray resection and further resection of 5th metatarsal stump 5/23/2025  -Adequate bleeding noted intraoperatively  -Intra-op cx from 5/16 (+) for serratia, enterococcus faecalis and pseudomonas   -ID following for ABX management; input appreciated  -Cardiology following for CHF management; input appreciated   -Continue medical management per primary team  -Case management following w/ plans for STR  -Pt is stable for discharge from a vascular surgery standpoint, vascular will signoff  -Plan follow up outpt in vascular  surgery office; appt scheduled for 6/5/25  -D/w Dr. Aiken

## 2025-05-24 NOTE — ASSESSMENT & PLAN NOTE
- troponin trend: 163 > 155 > 193 > 237>100>91>92  - EKG shows nonspecific ST and T wave abnormalities  Non-MI related troponin elevation in the setting of anemia  Patient denies chest pain, chest pressure,or other anginal equivalents  Follows with [facility] cardiology outpatient -last appointment 4/23/2025  Pharmacologic stress test on 1/9/2025 showing no diagnostic evidence of ischemia or perfusion deficits.  Patient active preoperatively riding a stationary bike with no reduction in exercise tolerance or anginal symptoms.

## 2025-05-24 NOTE — ANESTHESIA POSTPROCEDURE EVALUATION
Post-Op Assessment Note            No anethesia notable event occurred.            Last Filed PACU Vitals:  Vitals Value Taken Time   Temp 97.5 °F (36.4 °C) 05/23/25 15:46   Pulse 73 05/23/25 15:48   /70 05/23/25 15:46   Resp 16 05/23/25 15:30   SpO2 96 % 05/23/25 15:48   Vitals shown include unfiled device data.    Modified Casi:     Vitals Value Taken Time   Activity 2 05/23/25 15:30   Respiration 2 05/23/25 15:30   Circulation 2 05/23/25 15:30   Consciousness 2 05/23/25 15:30   Oxygen Saturation 2 05/23/25 15:30     Modified Casi Score: 10

## 2025-05-24 NOTE — PROGRESS NOTES
Podiatry - Progress Note  Patient: Gold Van 79 y.o. male   MRN: [id number]  PCP: Shayne Diaz MD  Unit/Bed#: [address] 217-01 Encounter: [id number]  Date Of Visit: 05/24/25    ASSESSMENT:    Gold Van is a 79 y.o. male with:    Right 5th metatarsal ulceration with underlying OM ([place] 4)  - s/p right fifth ray resection (DOS: 5/16/25)  - S/p right fourth ray resection DOS 5/20/2025  Right foot cellulitis  PAD  - s/p right femoral endarterectomy and antegrade endovascular intervention (DOS: 5/13/25)  Type 2 diabetes mellitus  - A1c: 9.3% (4/9/25)      PLAN:    Patient seen and evaluated at bedside.  He is POD 1 right partial fourth ray amputation with wound debridement.  Surgicel removed from wound bed.  Wound bed is mostly red, granular with healthy bleeding.  Any nonviable tissue removed from wound bed, see procedure note below.  A wound VAC was applied, unfortunately received message from nursing staff shortly after that patient's wound VAC had become clogged.  Saline wet-to-dry applied, will attempt VAC reapplication Sunday 5/25.  Anticipate patient will need to remain in-house until at minimum first wound VAC change.  Labs and vitals reviewed.  Patient is afebrile with mild noted leukocytosis.  Will continue to trend labs and vitals while in-patient.  Antibiotics per infectious disease  Elevation and offloading on green foam wedges or pillows when non-ambulatory.  Rest of care per primary team.    Debridement Procedure:    After  Verbal Consent was obtained and time out performed. Wound located right foot was  excisionally Surgical- Subcutaneous  (CPT: 96125) debrided using scapel. Pre-debridement wound measures 9.5 cm x 3.5 cm x 1.4 cm.  Pain was controlled by Lack of sensation due to Neuropathy . Post debridement measurement 9.5 cm x 3.5 cm x 1.5 cm , with wound appearing Fresh bleeding tissue, viable, granular, and brisk pinpoint bleeding. . Tissue debrided includes depth of Subcutaneous  with devitalized tissue being debrided including Fibrous and Necrotic/eschar.  with a moderate amount of bleeding bleeding was noted during procedure. Hemostasis was achieved using pressure. Procedure was Well tolerated by patient.    Wound VAC application  1. Number of sponges: 1 black  2. Pressure settin continuous  3. Size of wound: 9.5 x 3.5 x 1.5 cm  4. Description of wound: Red, granular, healthy bleeding         Weightbearing status: Non-weightbearing right foot    SUBJECTIVE:     The patient was seen, evaluated, and assessed at bedside today. The patient was awake, alert, and in no acute distress. No acute events overnight. The patient reports he is doing well though has pain in the right foot. Patient denies N/V/F/chills/SOB/CP.      OBJECTIVE:     Vitals:   /72   Pulse 86   Temp 98.5 °F (36.9 °C)   Resp 19   Ht 6' (1.829 m)   Wt 80 kg (176 lb 5.9 oz)   SpO2 97%   BMI 23.92 kg/m²     Temp (24hrs), Av.9 °F (36.6 °C), Min:97.4 °F (36.3 °C), Max:98.5 °F (36.9 °C)      Physical Exam:     Lungs: Non labored breathing  Abdomen: Soft, non-tender.  Lower Extremity:  Cardiovascular status at baseline from admission.  Neurological status at baseline from admission.  Musculoskeletal status at baseline from admission. No calf tenderness noted.     Wound #: 1  Location: Right lateral foot  Length 9.5 cm: Width 3.5cm: Depth 1.5 cm:   Deepest Tissue Noted in Base: Bone  Probe to Bone: Exposed bone  Peripheral Skin Description: Attached  Granulation: 100% Fibrotic Tissue: 0% Necrotic Tissue: 0%   Drainage Amount: moderate, serosanguinous  Signs of Infection: No    Clinical Images 25:      Additional Data:     Labs:    Results from last 7 days   Lab Units 25  0459   WBC Thousand/uL 10.47*   HEMOGLOBIN g/dL 8.5*   HEMATOCRIT % 26.7*   PLATELETS Thousands/uL 588*   SEGS PCT % 73   LYMPHO PCT % 14   MONO PCT % 10   EOS PCT % 1     Results from last 7 days   Lab Units 25  9587  "  POTASSIUM mmol/L 4.3   CHLORIDE mmol/L 103   CO2 mmol/L 24   BUN mg/dL 10   CREATININE mg/dL 0.88   CALCIUM mg/dL 8.2*           * I Have Reviewed All Lab Data Listed Above.    Recent Cultures (last 7 days):               Imaging: I have personally reviewed pertinent films in PACS  EKG, Pathology, and Other Studies: I have personally reviewed pertinent reports.    ** Please Note: Portions of the record may have been created with voice recognition software. Occasional wrong word or \"sound a like\" substitutions may have occurred due to the inherent limitations of voice recognition software. Read the chart carefully and recognize, using context, where substitutions have occurred. **      "

## 2025-05-24 NOTE — ASSESSMENT & PLAN NOTE
Continue atenolol 25 mg daily and lisinopril 20 mg twice daily  Home amlodipine on hold due to low-normal blood pressures  Blood pressure adequately controlled  Continue current regimen

## 2025-05-25 LAB
ANION GAP SERPL CALCULATED.3IONS-SCNC: 5 MMOL/L (ref 4–13)
BASOPHILS # BLD AUTO: 0.05 THOUSANDS/ÂΜL (ref 0–0.1)
BASOPHILS NFR BLD AUTO: 1 % (ref 0–1)
BUN SERPL-MCNC: 9 MG/DL (ref 5–25)
CALCIUM SERPL-MCNC: 8.3 MG/DL (ref 8.4–10.2)
CHLORIDE SERPL-SCNC: 105 MMOL/L (ref 96–108)
CO2 SERPL-SCNC: 25 MMOL/L (ref 21–32)
CREAT SERPL-MCNC: 0.91 MG/DL (ref 0.6–1.3)
EOSINOPHIL # BLD AUTO: 0.14 THOUSAND/ÂΜL (ref 0–0.61)
EOSINOPHIL NFR BLD AUTO: 2 % (ref 0–6)
ERYTHROCYTE [DISTWIDTH] IN BLOOD BY AUTOMATED COUNT: 15.9 % (ref 11.6–15.1)
GFR SERPL CREATININE-BSD FRML MDRD: 79 ML/MIN/1.73SQ M
GLUCOSE SERPL-MCNC: 147 MG/DL (ref 65–140)
GLUCOSE SERPL-MCNC: 155 MG/DL (ref 65–140)
GLUCOSE SERPL-MCNC: 260 MG/DL (ref 65–140)
GLUCOSE SERPL-MCNC: 278 MG/DL (ref 65–140)
GLUCOSE SERPL-MCNC: 359 MG/DL (ref 65–140)
HCT VFR BLD AUTO: 25.4 % (ref 36.5–49.3)
HGB BLD-MCNC: 8.1 G/DL (ref 12–17)
IMM GRANULOCYTES # BLD AUTO: 0.1 THOUSAND/UL (ref 0–0.2)
IMM GRANULOCYTES NFR BLD AUTO: 1 % (ref 0–2)
LYMPHOCYTES # BLD AUTO: 1.71 THOUSANDS/ÂΜL (ref 0.6–4.47)
LYMPHOCYTES NFR BLD AUTO: 18 % (ref 14–44)
MCH RBC QN AUTO: 29.5 PG (ref 26.8–34.3)
MCHC RBC AUTO-ENTMCNC: 31.9 G/DL (ref 31.4–37.4)
MCV RBC AUTO: 92 FL (ref 82–98)
MONOCYTES # BLD AUTO: 0.85 THOUSAND/ÂΜL (ref 0.17–1.22)
MONOCYTES NFR BLD AUTO: 9 % (ref 4–12)
NEUTROPHILS # BLD AUTO: 6.71 THOUSANDS/ÂΜL (ref 1.85–7.62)
NEUTS SEG NFR BLD AUTO: 69 % (ref 43–75)
NRBC BLD AUTO-RTO: 0 /100 WBCS
PLATELET # BLD AUTO: 541 THOUSANDS/UL (ref 149–390)
PMV BLD AUTO: 8.9 FL (ref 8.9–12.7)
POTASSIUM SERPL-SCNC: 4.6 MMOL/L (ref 3.5–5.3)
RBC # BLD AUTO: 2.75 MILLION/UL (ref 3.88–5.62)
SODIUM SERPL-SCNC: 135 MMOL/L (ref 135–147)
WBC # BLD AUTO: 9.56 THOUSAND/UL (ref 4.31–10.16)

## 2025-05-25 PROCEDURE — 99232 SBSQ HOSP IP/OBS MODERATE 35: CPT | Performed by: INTERNAL MEDICINE

## 2025-05-25 PROCEDURE — 97530 THERAPEUTIC ACTIVITIES: CPT

## 2025-05-25 PROCEDURE — G0545 PR INHERENT VISIT TO INPT: HCPCS | Performed by: INTERNAL MEDICINE

## 2025-05-25 PROCEDURE — 82948 REAGENT STRIP/BLOOD GLUCOSE: CPT

## 2025-05-25 PROCEDURE — 80048 BASIC METABOLIC PNL TOTAL CA: CPT | Performed by: INTERNAL MEDICINE

## 2025-05-25 PROCEDURE — 85025 COMPLETE CBC W/AUTO DIFF WBC: CPT | Performed by: INTERNAL MEDICINE

## 2025-05-25 PROCEDURE — 97535 SELF CARE MNGMENT TRAINING: CPT | Performed by: OCCUPATIONAL THERAPIST

## 2025-05-25 PROCEDURE — 97110 THERAPEUTIC EXERCISES: CPT

## 2025-05-25 PROCEDURE — 97605 NEG PRS WND THER DME<=50SQCM: CPT | Performed by: PODIATRIST

## 2025-05-25 PROCEDURE — 97168 OT RE-EVAL EST PLAN CARE: CPT | Performed by: OCCUPATIONAL THERAPIST

## 2025-05-25 PROCEDURE — 97164 PT RE-EVAL EST PLAN CARE: CPT

## 2025-05-25 RX ORDER — LISINOPRIL 5 MG/1
5 TABLET ORAL 2 TIMES DAILY
Status: DISCONTINUED | OUTPATIENT
Start: 2025-05-25 | End: 2025-05-27

## 2025-05-25 RX ADMIN — GLIPIZIDE 10 MG: 5 TABLET, FILM COATED, EXTENDED RELEASE ORAL at 17:14

## 2025-05-25 RX ADMIN — PIPERACILLIN AND TAZOBACTAM 4.5 G: 36; 4.5 INJECTION, POWDER, LYOPHILIZED, FOR SOLUTION INTRAVENOUS at 23:05

## 2025-05-25 RX ADMIN — ACETAMINOPHEN 975 MG: 325 TABLET, FILM COATED ORAL at 21:20

## 2025-05-25 RX ADMIN — INSULIN LISPRO 4 UNITS: 100 INJECTION, SOLUTION INTRAVENOUS; SUBCUTANEOUS at 12:10

## 2025-05-25 RX ADMIN — MONTELUKAST 10 MG: 10 TABLET, FILM COATED ORAL at 21:20

## 2025-05-25 RX ADMIN — ALBUTEROL SULFATE 1 PUFF: 90 AEROSOL, METERED RESPIRATORY (INHALATION) at 13:27

## 2025-05-25 RX ADMIN — PIPERACILLIN AND TAZOBACTAM 4.5 G: 36; 4.5 INJECTION, POWDER, LYOPHILIZED, FOR SOLUTION INTRAVENOUS at 06:11

## 2025-05-25 RX ADMIN — INSULIN LISPRO 2 UNITS: 100 INJECTION, SOLUTION INTRAVENOUS; SUBCUTANEOUS at 21:21

## 2025-05-25 RX ADMIN — GLIPIZIDE 10 MG: 5 TABLET, FILM COATED, EXTENDED RELEASE ORAL at 08:17

## 2025-05-25 RX ADMIN — PIPERACILLIN AND TAZOBACTAM 4.5 G: 36; 4.5 INJECTION, POWDER, LYOPHILIZED, FOR SOLUTION INTRAVENOUS at 14:35

## 2025-05-25 RX ADMIN — ATORVASTATIN CALCIUM 40 MG: 40 TABLET, FILM COATED ORAL at 17:14

## 2025-05-25 RX ADMIN — LISINOPRIL 20 MG: 20 TABLET ORAL at 08:16

## 2025-05-25 RX ADMIN — OXYCODONE HYDROCHLORIDE 10 MG: 10 TABLET ORAL at 13:13

## 2025-05-25 RX ADMIN — ACETAMINOPHEN 975 MG: 325 TABLET, FILM COATED ORAL at 05:43

## 2025-05-25 RX ADMIN — Medication 1 TABLET: at 08:17

## 2025-05-25 RX ADMIN — CLOPIDOGREL BISULFATE 75 MG: 75 TABLET, FILM COATED ORAL at 08:17

## 2025-05-25 RX ADMIN — FAMOTIDINE 20 MG: 20 TABLET, FILM COATED ORAL at 08:17

## 2025-05-25 RX ADMIN — BUDESONIDE, GLYCOPYRROLATE, AND FORMOTEROL FUMARATE 2 PUFF: 160; 9; 4.8 AEROSOL, METERED RESPIRATORY (INHALATION) at 08:16

## 2025-05-25 RX ADMIN — PREDNISONE 5 MG: 5 TABLET ORAL at 08:17

## 2025-05-25 RX ADMIN — FAMOTIDINE 20 MG: 20 TABLET, FILM COATED ORAL at 17:14

## 2025-05-25 RX ADMIN — ATENOLOL 25 MG: 50 TABLET ORAL at 08:17

## 2025-05-25 RX ADMIN — APIXABAN 5 MG: 5 TABLET, FILM COATED ORAL at 17:14

## 2025-05-25 RX ADMIN — INSULIN LISPRO 3 UNITS: 100 INJECTION, SOLUTION INTRAVENOUS; SUBCUTANEOUS at 17:14

## 2025-05-25 RX ADMIN — APIXABAN 5 MG: 5 TABLET, FILM COATED ORAL at 08:17

## 2025-05-25 RX ADMIN — BUDESONIDE, GLYCOPYRROLATE, AND FORMOTEROL FUMARATE 2 PUFF: 160; 9; 4.8 AEROSOL, METERED RESPIRATORY (INHALATION) at 21:20

## 2025-05-25 RX ADMIN — LISINOPRIL 5 MG: 5 TABLET ORAL at 21:21

## 2025-05-25 RX ADMIN — ACETAMINOPHEN 975 MG: 325 TABLET, FILM COATED ORAL at 13:13

## 2025-05-25 NOTE — ASSESSMENT & PLAN NOTE
Patient had episode of dyspnea after blood transfusion   Most likely volume overload secondary to blood transfusions, IV fluids/antibiotics, in the setting of chronic hpf EF  Chest x-ray: Moderate interstitial edema  5/2025 echocardiogram: LVEF 50%.  Grade 1 DD  Patient was diuresed with IV Lasix and improved  Per cardiology: Monitor off diuretics, euvolemic

## 2025-05-25 NOTE — CASE MANAGEMENT
Case Management Discharge Planning Note    Patient name Gold Van  Location [place] [address] /[facility] 2 M* MRN [id number]  : 1945 Date 2025       Current Admission Date: 2025  Current Admission Diagnosis:Sepsis without acute organ dysfunction (Formerly [facility])   Patient Active Problem List    Diagnosis Date Noted    Osteomyelitis (Formerly [facility]) 2025    Troponin I above reference range 2025    Volume overload 2025    Postoperative anemia 2025    Pulmonary hypertension (Formerly [facility]) 2025    Sepsis without acute organ dysfunction (Formerly [facility]) 2025    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 (Formerly [facility]) 2025    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 (Formerly [facility]) 2025    PAF (paroxysmal atrial fibrillation) (Formerly [facility]) 2025    Chronic heart failure with preserved ejection fraction (HFpEF) (Formerly [facility]) 2025    Ulcer of right foot with fat layer exposed secondary to osteomyelitis 2025    Severe peripheral arterial disease (Formerly [facility]) 2024    Moderate protein-calorie malnutrition (Formerly [facility]) 10/09/2024    Gastroesophageal reflux disease without esophagitis 10/04/2024    Impaired mobility and activities of daily living 10/04/2024    Neuropathy 10/04/2024    Foot drop, left 10/04/2024    Abnormal echocardiogram 10/03/2024    CVA (cerebrovascular accident) (Formerly [facility]) 10/02/2024    Dysmetria 10/01/2024    COVID-19 2024    Acute respiratory insufficiency 2024    Shortness of breath 2024    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[facility]ly [facility]) 2024    History of pneumothorax 2024    Acute respiratory failure with hypoxia ([facility]ly [facility]) 2024    Non-recurrent bilateral inguinal hernia without obstruction or gangrene 2024    Pruritus 2024    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 2021    Hyponatremia 2021    Lung nodule 2021    Type 2 diabetes mellitus with complication, without long-term current use of insulin  ([facility]) 10/23/2017    Essential hypertension 10/23/2017    Mild intermittent asthma without complication 10/23/2017    Mixed hyperlipidemia 10/23/2017      LOS (days): 14  Geometric Mean LOS (GMLOS) (days): 9.6  Days to GMLOS:-4.1     OBJECTIVE:  Risk of Unplanned Readmission Score: 34.98         Current admission status: Inpatient   Preferred Pharmacy:   [facility] - [place], [place] - [address]  [address] [facility] [address]  Phone: [phone] Fax: [phone]    [facility] - [place], [place] - [address]  [address] [facility] [address]  Phone: [phone] Fax: [phone]    [facility] ([facility]) #[id number] [place], [place] - [address] [address]  Phone: [phone] Fax: [phone]    Primary Care Provider: Shayne Diaz MD    Primary Insurance: [employer] [facility]  Secondary Insurance:     DISCHARGE DETAILS:         Additional Comments: CM spoke with patient's spouse, Mya via phone to discuss discharge planning, family requesting re-request [facility]. CM reviewed update with expection for 'Sorry we are currently at census with no expected discharges.' CM reviewed with patient's spouse; patient's spouse agreeable and understood. Patient reportedly agreeable to STR with [facility]. Patient's spouse requested CM to add for [place]. Patient's spouse requesting for a little more time for STR decision. CM explained need for insurance authorizations, patient's spouse understood. CM to follow for STR decision, CM provided contact information and encouraged patient's spouse to provide CM with voice mail if made decision tomorrow, patient's spouse understood eli agreeable. CM to follow for further discharge planning.

## 2025-05-25 NOTE — PLAN OF CARE
Problem: PAIN - ADULT  Goal: Verbalizes/displays adequate comfort level or baseline comfort level  Description: Interventions:- Encourage patient to monitor pain and request assistance- Assess pain using appropriate pain scale- Administer analgesics based on type and severity of pain and evaluate response- Implement non-pharmacological measures as appropriate and evaluate response- Consider cultural and social influences on pain and pain management- Notify physician/advanced practitioner if interventions unsuccessful or patient reports new pain  Outcome: Progressing     Problem: DISCHARGE PLANNING  Goal: Discharge to home or other facility with appropriate resources  Description: INTERVENTIONS:- Identify barriers to discharge w/patient and caregiver- Arrange for needed discharge resources and transportation as appropriate- Identify discharge learning needs (meds, wound care, etc.)- Arrange for interpretive services to assist at discharge as needed- Refer to Case Management Department for coordinating discharge planning if the patient needs post-hospital services based on physician/advanced practitioner order or complex needs related to functional status, cognitive ability, or social support system  Outcome: Progressing     Problem: SKIN/TISSUE INTEGRITY - ADULT  Goal: Skin Integrity remains intact(Skin Breakdown Prevention)  Description: Assess:-Perform Sae assessment -Clean and moisturize skin -Inspect skin when repositioning, toileting, and assisting with ADLS-Assess under medical devices -Assess extremities for adequate circulation and sensation Bed Management:-Have minimal linens on bed & keep smooth, unwrinkled-Change linens as needed when moist or perspiring-Avoid sitting or lying in one position for more than 2 hours while in bed-Keep HOB at 30 degrees Toileting:-Offer bedside commode-Assess for incontinence -Use incontinent care products after each incontinent episode Activity:-Mobilize patient 3 times  a day-Encourage activity and walks on unit-Encourage or provide ROM exercises -Turn and reposition patient every 2 Hours-Use appropriate equipment to lift or move patient in bed-Instruct/ Assist with weight shifting  when out of bed in chair-Consider limitation of chair time 2 hour intervalsSkin Care:-Avoid use of baby powder, tape, friction and shearing, hot water or constrictive clothing-Relieve pressure over bony prominences Do not massage red bony areasNext Steps:-Teach patient strategies to minimize risks Consider consults to  interdisciplinary teams   Outcome: Progressing  Goal: Incision(s), wounds(s) or drain site(s) healing without S/S of infection  Description: INTERVENTIONS- Assess and document dressing, incision, wound bed, drain sites and surrounding tissue- Provide patient and family education- Perform skin care/dressing changes every shift  Outcome: Progressing  Goal: Pressure injury heals and does not worsen  Description: Interventions:- Implement low air loss mattress or specialty surface (Criteria met)- Apply silicone foam dressing- Instruct/assist with weight shifting every 120 minutes when in chair - Limit chair time to 2 hour intervals- Use special pressure reducing interventions such as turning when in chair - Apply fecal or urinary incontinence containment device - Perform passive or active ROM every 2 hours- Turn and reposition patient & offload bony prominences every 2 hours - Utilize friction reducing device or surface for transfers - Consider consults to  interdisciplinary teams such as wound- Use incontinent care products after each incontinent episode such as wipes- Consider nutrition services referral as needed  Outcome: Progressing     Problem: Nutrition/Hydration-ADULT  Goal: Nutrient/Hydration intake appropriate for improving, restoring or maintaining nutritional needs  Description: Monitor and assess patient's nutrition/hydration status for malnutrition. Collaborate with  interdisciplinary team and initiate plan and interventions as ordered.  Monitor patient's weight and dietary intake as ordered or per policy. Utilize nutrition screening tool and intervene as necessary. Determine patient's food preferences and provide high-protein, high-caloric foods as appropriate. INTERVENTIONS:- Monitor oral intake, urinary output, labs, and treatment plans- Assess nutrition and hydration status and recommend course of action- Evaluate amount of meals eaten- Assist patient with eating if necessary - Allow adequate time for meals- Recommend/ encourage appropriate diets, oral nutritional supplements, and vitamin/mineral supplements- Order, calculate, and assess calorie counts as needed- Recommend, monitor, and adjust tube feedings and TPN/PPN based on assessed needs- Assess need for intravenous fluids- Provide specific nutrition/hydration education as appropriate- Include patient/family/caregiver in decisions related to nutrition  Outcome: Progressing     Problem: Prexisting or High Potential for Compromised Skin Integrity  Goal: Skin integrity is maintained or improved  Description: INTERVENTIONS:  - Identify patients at risk for skin breakdown  - Assess and monitor skin integrity  - Assess and monitor nutrition and hydration status  - Monitor labs   - Assess for incontinence   - Turn and reposition patient  - Assist with mobility/ambulation  - Relieve pressure over bony prominences  - Avoid friction and shearing  - Provide appropriate hygiene as needed including keeping skin clean and dry  - Evaluate need for skin moisturizer/barrier cream  - Collaborate with interdisciplinary team   - Patient/family teaching  - Consider wound care consult   Outcome: Progressing     Problem: INFECTION - ADULT  Goal: Absence or prevention of progression during hospitalization  Description: INTERVENTIONS:  - Assess and monitor for signs and symptoms of infection  - Monitor lab/diagnostic results  - Monitor all  insertion sites, i.e. indwelling lines, tubes, and drains  - Monitor endotracheal if appropriate and nasal secretions for changes in amount and color  - [place] appropriate cooling/warming therapies per order  - Administer medications as ordered  - Instruct and encourage patient and family to use good hand hygiene technique  - Identify and instruct in appropriate isolation precautions for identified infection/condition  Outcome: Progressing  Goal: Absence of fever/infection during neutropenic period  Description: INTERVENTIONS:  - Monitor WBC    Outcome: Progressing     Problem: SAFETY ADULT  Goal: Patient will remain free of falls  Description: INTERVENTIONS:  - Educate patient/family on patient safety including physical limitations  - Instruct patient to call for assistance with activity   - Consult OT/PT to assist with strengthening/mobility   - Keep Call bell within reach  - Keep bed low and locked with side rails adjusted as appropriate  - Keep care items and personal belongings within reach  - Initiate and maintain comfort rounds  - Make Fall Risk Sign visible to staff  - Offer Toileting every 2 Hours, in advance of need  - Initiate/Maintain bed alarm  - Obtain necessary fall risk management equipment:   - Apply yellow socks and bracelet for high fall risk patients  - Consider moving patient to room near nurses station  Outcome: Progressing  Goal: Maintain or return to baseline ADL function  Description: INTERVENTIONS:  -  Assess patient's ability to carry out ADLs; assess patient's baseline for ADL function and identify physical deficits which impact ability to perform ADLs (bathing, care of mouth/teeth, toileting, grooming, dressing, etc.)  - Assess/evaluate cause of self-care deficits   - Assess range of motion  - Assess patient's mobility; develop plan if impaired  - Assess patient's need for assistive devices and provide as appropriate  - Encourage maximum independence but intervene and supervise when  necessary  - Involve family in performance of ADLs  - Assess for home care needs following discharge   - Consider OT consult to assist with ADL evaluation and planning for discharge  - Provide patient education as appropriate  Outcome: Progressing  Goal: Maintains/Returns to pre admission functional level  Description: INTERVENTIONS:  - Perform AM-PAC 6 Click Basic Mobility/ Daily Activity assessment daily.  - Set and communicate daily mobility goal to care team and patient/family/caregiver.   - Collaborate with rehabilitation services on mobility goals if consulted  - Perform Range of Motion 4 times a day.  - Reposition patient every 3 hours.  - Dangle patient 3 times a day  - Stand patient 3 times a day  - Ambulate patient 3 times a day  - Out of bed to chair 3 times a day   - Out of bed for meals 3 times a day  - Out of bed for toileting  - Record patient progress and toleration of activity level   Outcome: Progressing     Problem: Knowledge Deficit  Goal: Patient/family/caregiver demonstrates understanding of disease process, treatment plan, medications, and discharge instructions  Description: Complete learning assessment and assess knowledge base.  Interventions:  - Provide teaching at level of understanding  - Provide teaching via preferred learning methods  Outcome: Progressing     Problem: METABOLIC, FLUID AND ELECTROLYTES - ADULT  Goal: Electrolytes maintained within normal limits  Description: INTERVENTIONS:  - Monitor labs and assess patient for signs and symptoms of electrolyte imbalances  - Administer electrolyte replacement as ordered  - Monitor response to electrolyte replacements, including repeat lab results as appropriate  - Instruct patient on fluid and nutrition as appropriate  Outcome: Progressing     Problem: HEMATOLOGIC - ADULT  Goal: Maintains hematologic stability  Description: INTERVENTIONS  - Assess for signs and symptoms of bleeding or hemorrhage  - Monitor labs  - Administer supportive  blood products/factors as ordered and appropriate  Outcome: Progressing     Problem: CARDIOVASCULAR - ADULT  Goal: Maintains optimal cardiac output and hemodynamic stability  Description: INTERVENTIONS:  - Monitor I/O, vital signs and rhythm  - Monitor for S/S and trends of decreased cardiac output  - Administer and titrate ordered vasoactive medications to optimize hemodynamic stability  - Assess quality of pulses, skin color and temperature  - Assess for signs of decreased coronary artery perfusion  - Instruct patient to report change in severity of symptoms  Outcome: Progressing  Goal: Absence of cardiac dysrhythmias or at baseline rhythm  Description: INTERVENTIONS:  - Continuous cardiac monitoring, vital signs, obtain 12 lead EKG if ordered  - Administer antiarrhythmic and heart rate control medications as ordered  - Monitor electrolytes and administer replacement therapy as ordered  Outcome: Progressing

## 2025-05-25 NOTE — OCCUPATIONAL THERAPY NOTE
Occupational Therapy Re-evaluation and tx Note     Patient Name: Gold Van  Today's Date: 5/25/2025  Problem List  Principal Problem:    Sepsis without acute organ dysfunction (HCC)  Active Problems:    Type 2 diabetes mellitus with complication, without long-term current use of insulin (HCC)    Essential hypertension    Mild intermittent asthma without complication    Lung nodule    Pruritus    Severe peripheral arterial disease (HCC)    Ulcer of right foot with fat layer exposed secondary to osteomyelitis    PAF (paroxysmal atrial fibrillation) (HCC)    Chronic heart failure with preserved ejection fraction (HFpEF) (HCC)    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 (HCC)    Pulmonary hypertension (HCC)    Postoperative anemia    Osteomyelitis (HCC)    Troponin I above reference range    Volume overload        05/25/25 0826   OT Last Visit   OT Visit Date 05/25/25   Note Type   Note type Re-Evaluation;Evaluation Request for Authorization   Pain Assessment   Pain Assessment Tool FLACC   Pain Score No Pain   Pain Rating: FLACC (Rest) - Face 0   Pain Rating: FLACC (Rest) - Legs 0   Pain Rating: FLACC (Rest) - Activity 0   Pain Rating: FLACC (Rest) - Cry 0   Pain Rating: FLACC (Rest) - Consolability 0   Score: FLACC (Rest) 0   Pain Rating: FLACC (Activity) - Face 0   Pain Rating: FLACC (Activity) - Legs 0   Pain Rating: FLACC (Activity) - Activity 0   Pain Rating: FLACC (Activity) - Cry 0   Pain Rating: FLACC (Activity) - Consolability 0   Score: FLACC (Activity) 0   Restrictions/Precautions   Weight Bearing Precautions Per Order Yes   RLE Weight Bearing Per Order NWB   Other Precautions Chair Alarm;Bed Alarm;WBS;Multiple lines;Fall Risk   Home Living   Type of Home House   Home Layout One level;Performs ADLs on one level;Able to live on main level with bedroom/bathroom;Stairs to enter with rails  ([address])   Additional Comments please see IE 5/12 for more details   Prior  "Function   Level of [place] Independent with ADLs;Independent with functional mobility  (Prior to present illness, (I) with ADL/ shared IADL tasks with spouse. Currently requiring increased (A) with tasks.)   Lives With Spouse   Receives Help From Family   IADLs Independent with driving;Family/Friend/Other provides meals;Family/Friend/Other provides medication management   Lifestyle   Autonomy PTA, was (I) with ADLs and required (A) with IADLs. Patient lives in a raised ranch, [address] w/ HR, with spouse. Performs ADLs on one level. Walk in shower with grab bar. Raised toilet. RW, cane. Uses cane at baseline, has a shower chair and stool outside of shower. (+) . (-) falls.   General   Additional Pertinent History Comorbidities affecting pt’s functional performance include a significant PMH of: DM2, HTN, asthma, CVA, COPD, COVID-19, neuropathy, L foot drop.  Patient with active OT orders and activity orders for Activity as tolerated, OOB to chair.   Subjective   Subjective \"I can't walk.\"   ADL   Where Assessed Edge of bed   Eating Assistance 7  Independent   Grooming Assistance 6  Modified Independent   UB Bathing Assistance 5  Supervision/Setup   LB Bathing Assistance 4  Minimal Assistance   UB Dressing Assistance 6  Modified independent   LB Dressing Assistance 3  Moderate Assistance   LB Dressing Deficit Setup;Verbal cueing;Supervision/safety;Increased time to complete;Don/doff R sock;Don/doff L sock;Pull up over hips   Toileting Assistance  3  Moderate Assistance   Toileting Deficit   (balance and maintaining NWB)   Bed Mobility   Supine to Sit 5  Supervision   Additional items HOB elevated;Bedrails   Transfers   Sit to Stand 4  Minimal assistance   Additional items Assist x 2;Bedrails   Stand to Sit 4  Minimal assistance   Additional items Assist x 2;Bedrails;Increased time required;Verbal cues   Additional Comments w/ RW; pt able to maintain NWB to R foot w/ cues during static standing   Balance "   Static Sitting Fair   Dynamic Sitting Fair -   Static Standing Poor +   Dynamic Standing Poor   Ambulatory Poor   Activity Tolerance   Activity Tolerance Patient limited by fatigue;Treatment limited secondary to medical complications (Comment)  (RLE NWB)   Medical Staff Made Aware PT Wander   Nurse Made Aware Naa RN   RUE Assessment   RUE Assessment WFL   RUE Strength   RUE Overall Strength Within Functional Limits - able to perform ADL tasks with strength   LUE Assessment   LUE Assessment WFL   LUE Strength   LUE Overall Strength Within Functional Limits - able to perform ADL tasks with strength   Hand Function   Gross Motor Coordination Functional   Fine Motor Coordination Functional   Psychosocial   Psychosocial (WDL) WDL   Cognition   Overall Cognitive Status WFL   Arousal/Participation Alert;Responsive;Cooperative   Attention Attends with cues to redirect   Orientation Level Oriented to person;Oriented to place;Oriented to time   Memory   (required cues for NWB RLE)   Following Commands Follows one step commands without difficulty   Assessment   Limitation Decreased ADL status;Decreased Safe judgement during ADL;Decreased endurance;Decreased high-level ADLs   Prognosis Good   Assessment Patient is a 79 y.o. year old male seen for OT eval s/p admit to initially  SLA on 5/11/2025 with sepsis without acute organ dysfunction, atherosclerosis of native arteries of R leg with ulceration of heel and midfoot - pending vascular intervention. But Pt s/p 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 Per RN, wound vac was applied yesterday but subsequently removed. Pt seen for OT re-eval & for insurance auth. Pt currently requires Supervision for bed mobility sit<>supine; but demo fluctuating sitting bal at EOB from F- to P+ while attempting LBD task of donning/doffing socks. Pt required Mod A for LBD of donning/doffing sock to L foot 2* dec balance, dec activity daryn. Pt requires setup for UB tasks  such as dressing and seated grooming. Pt requires Min A x 2 for STS t/f, toilet transfers, functional mobility and bed to side chair t/f 2* pt demo difficulty following RLW NWB precautions- requiring frequent verbal anc tactile cues to follow and maintain. Pt noted with generalized weakness, requiring inc assistance with any standing tasks such as toileting or pants- Mod A to ensure proper NWB precautions. Noted low BP 97/63. Also noted elevated -120's w/ activity. RN made aware. Dizziness resolved upon resting in chair. Pt would benefit from continued skilled OT while in acute setting to address deficits as defined above and to maximize (I) w/ ADLs/functional mobility. Occupational performance areas to address include: toilet hygiene, dressing, functional mobility, and clothing management.  At this time, recommendation for pt to receive post-acute rehabilitation services at a Level II (moderate resource intensity) due to above deficits and CLOF as well as pt current [facility] score of 19 which indicated skilled rehab. OT will continue to follow pt 2-3x/wk to address the goals listed below to  w/in 10-14 days.   Goals   Patient Goals to be home   LTG Time Frame 10-   Long Term Goal 1-Pt will perf bed<>BSC t/f with Mod I maintain RLE NWB, 2-  Pt will inc sitting bal to F+ at EOB for 5 min in prep for beside LB ADLs 3- Pt will perf LB dressing tasks + AE with Mod I while maintaining RLE NWB 4-  Pt will inc standing balance/daryn to F+ for 5 min in prep ADLs such as sinkside grooming tasks maintaining RLE NWB 5- Pt will perf all aspects of toileting tasks to Mod I maintaining RLW NWB   Plan   Treatment Interventions ADL retraining;Functional transfer training;UE strengthening/ROM;Endurance training;Patient/family training;Neuromuscular reeducation;Compensatory technique education;Energy conservation   Goal Expiration Date 25   OT Treatment Day 0   OT Frequency 2-3x/wk   Discharge Recommendation   Rehab  Resource Intensity Level, OT II (Moderate Resource Intensity)   AM-PAC Daily Activity Inpatient   Lower Body Dressing 3   Bathing 3   Toileting 3   Upper Body Dressing 3   Grooming 3   Eating 4   Daily Activity Raw Score 19   Daily Activity Standardized Score (Calc for Raw Score >=11) 40.22   AM-PAC Applied Cognition Inpatient   Following a Speech/Presentation 4   Understanding Ordinary Conversation 4   Taking Medications 3   Remembering Where Things Are Placed or Put Away 4   Remembering List of 4-5 Errands 3   Taking Care of Complicated Tasks 3   Applied Cognition Raw Score 21   Applied Cognition Standardized Score 44.3     Aleisha Angeles MS OTR/L CLT

## 2025-05-25 NOTE — ASSESSMENT & PLAN NOTE
Follows with [facility] cardiology:   Continue atenolol 25 mg daily.  Anticoagulation: Apixaban   Continue beta-blocker (changed hold parameters due to episode of rapid heart rate when beta-blocker was held)  Currently in sinus rhythm

## 2025-05-25 NOTE — PROGRESS NOTES
Progress Note - Infectious Disease   Name: Gold Van 79 y.o. male I MRN: [id number]  Unit/Bed#: [place] [address] -01 I Date of Admission: 5/11/2025   Date of Service: 5/25/2025 I Hospital Day: 14    Assessment & Plan  Sepsis without acute organ dysfunction (HCC)  Admit on initial presentation to [facility] with tachycardia and leukocytosis, secondary to diabetic right foot infection.  Blood cultures negative.  As below patient is with ongoing surgical site infection.  He is afebrile and hemodynamically stable.  -Antibiotic plan as below  -Monitor temperature/WBC  -Supportive care per primary team  Ulcer of right foot with fat layer exposed secondary to osteomyelitis  Known chronic diabetic foot ulcer, MRI in April suggestive of osteomyelitis of fifth metatarsal head.  Admitted with acute wound cellulitis status post right partial fifth ray resection with assumed surgical cure of osteomyelitis.  There was yellow viscous fluid within the metatarsal phalangeal joint consistent with infection.  On dressing change 5/19 patient had ongoing green-colored drainage concerning for ongoing soft tissue infection.  Patient will need further right foot washout.  OR cx from 5/16 grew Serratia, Enterococcus faecalis and Pseudomonas.  Patient has no major contraindication to fluoroquinolone; Qtc 446, no aortic aneurysms on CT.  Per podiatry a TMA was recommended however patient wanted to save as much as possible of his foot.  Patient was taken back to the OR on 5/23 with further I&D and right partial fourth ray resection and further resection of fifth metatarsal stump.  Both wounds were noted to be hard, viable with bleeding at surgery.  No purulence were encountered.  With absence of residual osteomyelitis, patient does not need long-term IV antibiotic.  - Continue IV Zosyn   - Treat x 7-day postop, through 5/30  - At discharge, transition to PO Amoxicillin 500 mg every 8 hours (for Enterococcus) plus PO Levofloxacin 750 mg  daily (for Pseudomonas and Serratia)  -Wound care per podiatry  Osteomyelitis ([facility])  Of fifth metatarsal head in setting of diabetic foot ulcer.  Now status post partial fifth ray resection with presumed surgical cure on 5/13.  Patient is status post repeat washout and resection of fifth metatarsal stump, with partial fourth ray resection, with residual bones hard, viable and with bleeding, all are signs of absence of residual osteomyelitis.  As in above, patient does not need long-term antibiotic.  - Antibiotic plan as above  - Wound care per podiatry  Severe peripheral arterial disease (HCC)  Status post vascular surgery intervention on 5/13 with right common femoral endarterectomy with bovine pericardial patch angioplasty, right SFA stenting, right angioplasty.  Type 2 diabetes mellitus with complication, without long-term current use of insulin ([facility])  Lab Results   Component Value Date    HGBA1C 9.3 (H) 04/09/2025   Significant risk factor for infection and poor wound healing.  -Tight glycemic control as per primary team  PAF (paroxysmal atrial fibrillation) ([facility])  On Eliquis at home.  Chronic heart failure with preserved ejection fraction (HFpEF) ([facility])  Cardiology following.    Discussed with patient in detail regarding the above plan.  Discharge plan per primary service.      Antibiotics:  Zosyn   POD # 2    Subjective   Patient's right foot pain up and down, but overall improved.  VAC is now off.  Temperature stays down.  No chills.  He is tolerating antibiotic well.  No nausea, vomiting or diarrhea.    Objective :  Temp:  [97.6 °F (36.4 °C)-98.6 °F (37 °C)] 98.2 °F (36.8 °C)  HR:  [] 117  BP: ()/(56-73) 97/63  Resp:  [16-19] 16  SpO2:  [96 %-98 %] 96 %  O2 Device: None (Room air)    Physical Exam:     General: Awake, alert, cooperative, no distress.   Neck:  Supple. No mass.  No lymphadenopathy.   Lungs: Expansion symmetric, no rales, no wheezing, respirations unlabored.   Heart:  Regular rate  and rhythm, S1 and S2 normal, no murmur.   Abdomen: Soft, nondistended, non-tender, bowel sounds active all four quadrants, no masses, no organomegaly.   Extremities: Trace leg edema.  Right foot with dressing in place.  Dressing is dry.  No erythema/warmth beyond dressing.  Mild tenderness.   Skin:  No rash.   Neuro: Moves all extremities.        Lab Results: I have reviewed the following results:  Results from last 7 days   Lab Units 05/25/25  0540 05/24/25  0459 05/23/25  1602 05/23/25  0503   WBC Thousand/uL 9.56 10.47*  --  7.13   HEMOGLOBIN g/dL 8.1* 8.5* 9.7* 8.8*   PLATELETS Thousands/uL 541* 588*  --  570*     Results from last 7 days   Lab Units 05/25/25  0540 05/24/25  0459 05/23/25  0503   SODIUM mmol/L 135 133* 134*   POTASSIUM mmol/L 4.6 4.3 4.2   CHLORIDE mmol/L 105 103 104   CO2 mmol/L 25 24 24   BUN mg/dL 9 10 11   CREATININE mg/dL 0.91 0.88 0.94   EGFR ml/min/1.73sq m 79 81 76   CALCIUM mg/dL 8.3* 8.2* 7.8*

## 2025-05-25 NOTE — PROGRESS NOTES
Podiatry - Progress Note  Patient: Gold Van 79 y.o. male   MRN: [id number]  PCP: Shayne Diaz MD  Unit/Bed#: [address]-01 Encounter: [id number]  Date Of Visit: 25    ASSESSMENT:    Gold Van is a 79 y.o. male with:    Right 5th metatarsal ulceration with underlying OM ([place] [facility])  - s/p right fifth ray resection (DOS: 25)  - S/p right fourth ray resection DOS 2025  Right foot cellulitis  PAD  - s/p right femoral endarterectomy and antegrade endovascular intervention (DOS: 25)  Type 2 diabetes mellitus  - A1c: 9.3% (25)      PLAN:    Patient seen and evaluated at bedside.  He is POD 2 right partial fourth ray amputation with wound debridement.  Wound bed is mostly red, granular with mild oozing from wound bed, no active bleeding.  A wound VAC  was applied, received message from nursing staff later in the day that patient's wound VAC had become clogged.  Tubing massaged and flushed with saline, new [place] pad applied.  Blockage resolved.  Anticipate patient will need to remain in-house until at minimum first wound VAC change.   Labs and vitals reviewed.  Blood pressure and heart rate are stable, 115/53 and 73 respectively.  Hemoglobin is 8.1.  Patient is afebrile with no noted leukocytosis.  Will continue to trend labs and vitals while in-patient.  Antibiotics per infectious disease  Elevation and offloading on green foam wedges or pillows when non-ambulatory.  Rest of care per primary team.     Wound VAC application  1. Number of sponges: 2 black  2. Pressure settin continuous  3. Size of wound: 9.5 x 3.5 x 1.5 cm  4. Description of wound: Red, granular, healthy bleeding, no active bleeding    Weightbearing status: Non-weightbearing right foot    SUBJECTIVE:     The patient was seen, evaluated, and assessed at bedside today. The patient was awake, alert, and in no acute distress. No acute events overnight. The patient reports he feels well, was able to work with  physical therapy this morning. Patient denies N/V/F/chills/SOB/CP.  Denies any dizziness or lightheadedness.  States wound VAC is comfortable, no excessive pressure or pain noted.    I had a lengthy discussion with patient at bedside regarding his foot wound.  We discussed that his healing course will be prolonged and at any point in time during that he is at risk for infection, worsening of the wound, need for further surgery or higher level of amputation.  Patient states that he is aware of this and understanding but he wants to try everything possible to save the foot.      OBJECTIVE:     Vitals:   /53   Pulse 73   Temp 97.9 °F (36.6 °C)   Resp 16   Ht 6' (1.829 m)   Wt 80.4 kg (177 lb 4 oz)   SpO2 98%   BMI 24.04 kg/m²     Temp (24hrs), Av °F (36.7 °C), Min:97.8 °F (36.6 °C), Max:98.2 °F (36.8 °C)      Physical Exam:     Lungs: Non labored breathing  Abdomen: Soft, non-tender.  Lower Extremity:  Cardiovascular status at baseline from admission.  Neurological status at baseline from admission.  Musculoskeletal status at baseline from admission. No calf tenderness noted.     Wound #: 1  Location: Right lateral foot  Length 9.5 cm: Width 3.5 cm: Depth 1.5 cm:   Deepest Tissue Noted in Base: Bone  Probe to Bone: Surgically exposed bone  Peripheral Skin Description: Attached  Granulation: 60% Fibrotic Tissue: 20% Necrotic Tissue: 20%   Drainage Amount: Moderate serosanguineous  Signs of Infection: No, no crepitus, fluctuance, purulence, erythema, malodor    Clinical Images 25:      Additional Data:     Labs:    Results from last 7 days   Lab Units 25  0540   WBC Thousand/uL 9.56   HEMOGLOBIN g/dL 8.1*   HEMATOCRIT % 25.4*   PLATELETS Thousands/uL 541*   SEGS PCT % 69   LYMPHO PCT % 18   MONO PCT % 9   EOS PCT % 2     Results from last 7 days   Lab Units 25  0540   POTASSIUM mmol/L 4.6   CHLORIDE mmol/L 105   CO2 mmol/L 25   BUN mg/dL 9   CREATININE mg/dL 0.91   CALCIUM mg/dL 8.3*  "          * I Have Reviewed All Lab Data Listed Above.    Recent Cultures (last 7 days):               Imaging: I have personally reviewed pertinent films in PACS  EKG, Pathology, and Other Studies: I have personally reviewed pertinent reports.    ** Please Note: Portions of the record may have been created with voice recognition software. Occasional wrong word or \"sound a like\" substitutions may have occurred due to the inherent limitations of voice recognition software. Read the chart carefully and recognize, using context, where substitutions have occurred. **      "

## 2025-05-25 NOTE — ASSESSMENT & PLAN NOTE
Prior baseline hemoglobin appears to range 10-12  Postprocedure hemoglobin decreased to 7.0 when patient was symptomatic: Secondary to expected procedural blood loss  On 5/18 transfused 1 unit PRBC   Repeat hemoglobin 8-9: Oozing noted at surgical site  Continue to monitor    Results from last 7 days   Lab Units 05/25/25  0540 05/24/25  0459 05/23/25  1602 05/23/25  0503   HEMOGLOBIN g/dL 8.1* 8.5* 9.7* 8.8*   MCV fL 92 94  --  91

## 2025-05-25 NOTE — PLAN OF CARE
Problem: OCCUPATIONAL THERAPY ADULT  Goal: Performs self-care activities at highest level of function for planned discharge setting.  See evaluation for individualized goals.  Description: Treatment Interventions: ADL retraining, Functional transfer training, UE strengthening/ROM, Endurance training, Patient/family training, Neuromuscular reeducation, Compensatory technique education, Energy conservation          See flowsheet documentation for full assessment, interventions and recommendations.   Note: Limitation: Decreased ADL status, Decreased Safe judgement during ADL, Decreased endurance, Decreased high-level ADLs  Prognosis: Good  Assessment: Patient is a 79 y.o. year old male seen for OT eval s/p admit to initially  SLA on 5/11/2025 with sepsis without acute organ dysfunction, atherosclerosis of native arteries of R leg with ulceration of heel and midfoot - pending vascular intervention. But Pt s/p 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 Per RN, wound vac was applied yesterday but subsequently removed. Pt seen for OT re-eval & for insurance auth. Pt currently requires Supervision for bed mobility sit<>supine; but demo fluctuating sitting bal at EOB from F- to P+ while attempting LBD task of donning/doffing socks. Pt required Mod A for LBD of donning/doffing sock to L foot 2* dec balance, dec activity daryn. Pt requires setup for UB tasks such as dressing and seated grooming. Pt requires Min A x 2 for STS t/f, toilet transfers, functional mobility and bed to side chair t/f 2* pt demo difficulty following RLW NWB precautions- requiring frequent verbal anc tactile cues to follow and maintain. Pt noted with generalized weakness, requiring inc assistance with any standing tasks such as toileting or pants- Mod A to ensure proper NWB precautions. Noted low BP 97/63. Also noted elevated -120's w/ activity. RN made aware. Dizziness resolved upon resting in chair. Pt would benefit from  continued skilled OT while in acute setting to address deficits as defined above and to maximize (I) w/ ADLs/functional mobility. Occupational performance areas to address include: toilet hygiene, dressing, functional mobility, and clothing management.  At this time, recommendation for pt to receive post-acute rehabilitation services at a Level II (moderate resource intensity) due to above deficits and CLOF as well as pt current AMPA score of 19 which indicated skilled rehab. OT will continue to follow pt 2-3x/wk to address the goals listed below to  w/in 10-14 days.     Rehab Resource Intensity Level, OT: II (Moderate Resource Intensity)

## 2025-05-25 NOTE — ASSESSMENT & PLAN NOTE
Patient developed acute onset of dyspnea evening of 5/18 after blood transfusion  EKG with transient lateral ST depression, and elevated trops  Appreciate Cardiology eval: elevated trop due to volume overload  On review of CT scan patient did have quite significant coronary calcifications, however NStress 1/25 neg for ischemia  Continue medical therapy with Plavix, statin, and beta-blocker  Continue medical therapy, appreciate cardiology evaluation and recommendations

## 2025-05-25 NOTE — ASSESSMENT & PLAN NOTE
Right fifth metatarsal ulceration with underlying osteomyelitis and surrounding right foot cellulitis  5/16: Underwent right partial fifth ray resection  5/20 right partial fourth ray amputation, wound debridement  cultures with Serratia/Enterococcus, and Pseudomonas  Antibiotics changed to IV Zosyn, with transition to oral amoxicillin/Levaquin for 7-day postop course through 5/30

## 2025-05-25 NOTE — ASSESSMENT & PLAN NOTE
Patient is a 79-year-old male with past medical history significant for DM2, PAF, asthma, and PAD who presented with right foot wound  Met sepsis criteria at [facility] initially  Diagnosed with right fifth metatarsal osteomyelitis with right foot cellulitis, started on abx and transferred here for vascular surgery  S/p vascular surgery and right fifth ray amputation 5/16  Status post fourth ray resection 5/20  intraoperative cultures: Serratia/Enterococcus and pseudomonas  Blood culture negative x 2  Appreciate evaluation and recommendations from infectious disease team: Continue antibiotics with Zosyn  Per ID: On IV Zosyn: At discharge recommend 7 days postop antibiotic with p.o. amoxicillin 500 mg every 8 hours plus p.o. Levaquin 750 mg daily: Last day antibiotics 5/30  Will need short-term rehab

## 2025-05-25 NOTE — ASSESSMENT & PLAN NOTE
Known chronic diabetic foot ulcer, MRI in April suggestive of osteomyelitis of fifth metatarsal head.  Admitted with acute wound cellulitis status post right partial fifth ray resection with assumed surgical cure of osteomyelitis.  There was yellow viscous fluid within the metatarsal phalangeal joint consistent with infection.  On dressing change 5/19 patient had ongoing green-colored drainage concerning for ongoing soft tissue infection.  Patient will need further right foot washout.  OR cx from 5/16 grew Serratia, Enterococcus faecalis and Pseudomonas.  Patient has no major contraindication to fluoroquinolone; Qtc 446, no aortic aneurysms on CT.  Per podiatry a TMA was recommended however patient wanted to save as much as possible of his foot.  Patient was taken back to the OR on 5/23 with further I&D and right partial fourth ray resection and further resection of fifth metatarsal stump.  Both wounds were noted to be hard, viable with bleeding at surgery.  No purulence were encountered.  With absence of residual osteomyelitis, patient does not need long-term IV antibiotic.  - Continue IV Zosyn   - Treat x 7-day postop, through 5/30  - At discharge, transition to PO Amoxicillin 500 mg every 8 hours (for Enterococcus) plus PO Levofloxacin 750 mg daily (for Pseudomonas and Serratia)  -Wound care per podiatry

## 2025-05-25 NOTE — ASSESSMENT & PLAN NOTE
Prior to admission on [facility].  No exacerbation  Albuterol metered-dose inhaler as needed

## 2025-05-25 NOTE — ASSESSMENT & PLAN NOTE
Continue atenolol 25 mg daily  Decrease lisinopril to 5 mg daily due to borderline blood pressure  Home amlodipine on hold due to low-normal blood pressures  Continue to monitor

## 2025-05-25 NOTE — PHYSICAL THERAPY NOTE
PT RE-EVALUATION & PROGRESS NOTE    Pt. Name: Gold Van  Pt. Age: 79 y.o.  MRN: [id number]  LENGTH OF STAY: 14      Admitting Diagnoses:   Sepsis without acute organ dysfunction (HCC) [A41.9]    Past Medical History[1]    Past Surgical History[2]    Imaging Studies:  XR foot right 3+ views   Final Result by Raj Aly MD (05/24 1322)      Interval performance of partial fourth ray amputation.      Computerized Assisted Algorithm (CAA) may have been used to analyze all applicable images.         Workstation performed: [id number]         XR chest portable   Final Result by Staci Shah MD (05/19 0742)      Moderate interstitial edema.      Nodular opacity at the left base, not visible on prior studies. Recommend follow-up with a chest radiograph with dual energy subtraction in 6 weeks to assure resolution.      This study was marked in Epic for immediate notification and follow-up.            Workstation performed: [id number]         XR foot 3+ vw right   Final Result by Brandon Brewer MD (05/16 1527)      Postop right foot.      No acute osseous abnormality.         Workstation performed: [id number]         XR or angio leg rt   Final Result by Richard Schroeder MD (05/16 0813)      Fluoroscopy provided for procedure guidance.      Please refer to the separate procedure note for additional details.                  Workstation performed: [id number]               05/25/25 0858   PT Last Visit   PT Visit Date 05/25/25   Note Type   Note type Re-Evaluation;Evaluation Request for Authorization   Pain Assessment   Pain Assessment Tool FLACC   Pain Score No Pain   Pain Rating: FLACC (Rest) - Face 0   Pain Rating: FLACC (Rest) - Legs 0   Pain Rating: FLACC (Rest) - Activity 0   Pain Rating: FLACC (Rest) - Cry 0   Pain Rating: FLACC (Rest) - Consolability 0   Score: FLACC (Rest) 0   Pain Rating: FLACC (Activity) - Face 0   Pain Rating: FLACC (Activity) - Legs 0   Pain Rating: FLACC (Activity) -  Activity 0   Pain Rating: FLACC (Activity) - Cry 0   Pain Rating: FLACC (Activity) - Consolability 0   Score: FLACC (Activity) 0   Restrictions/Precautions   RLE Weight Bearing Per Order NWB   Other Precautions Chair Alarm;Bed Alarm;Multiple lines;Fall Risk;Pain;WBS   Home Living   Type of Home House   Home Layout One level;Performs ADLs on one level;Able to live on main level with bedroom/bathroom;Stairs to enter with rails  (13STE)   Additional Comments please see IE on 5/12 for details   Prior Function   Level of [place] Independent with ADLs;Independent with functional mobility  (ambulates w/ SPC at baseline)   Lives With Spouse   Comments please see IE on 5/12 for details   General   Additional Pertinent History s/p right femoral endarterectomy and antegrade endovascular intervention on 5/13/25; s/p R partial 5th ray resection on 05/16/25; 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   RLE Assessment   RLE Assessment WFL   LLE Assessment   LLE Assessment WFL   Bed Mobility   Supine to Sit 5  Supervision   Additional items HOB elevated;Bedrails;Increased time required;Verbal cues   Additional Comments cues for techniques & safety   Transfers   Sit to Stand 4  Minimal assistance   Additional items Assist x 2;Bedrails;Increased time required;Verbal cues;Other  (bed height elevated)   Stand to Sit 4  Minimal assistance   Additional items Assist x 2;Increased time required;Verbal cues;Armrests   Additional Comments w/ RW; pt able to maintain NWB to R foot w/ cues during static standing   Ambulation/Elevation   Gait pattern  Antalgic;Wide KADIE;Decreased foot clearance;Decreased R stance;Short stride;Step to;Excessively slow   Ambulation/Elevation Additional Comments pt unable to maintain NWB to R foot despite max cues; pt unable to perform hop-to gait w/ RW; limit amb 2* to noncompliance w/ WBS despite cues   Balance   Static Sitting Fair   Dynamic Sitting Fair -   Static Standing Poor +  (w/ RW)   Dynamic Standing Poor  (w/ RW)   Ambulatory Poor   Endurance Deficit   Endurance Deficit Yes   Endurance Deficit Description weakness; dizziness   Activity Tolerance   Activity Tolerance Patient limited by fatigue;Treatment limited secondary to medical complications (Comment)   Medical Staff Made Aware OTR Aleisha   Nurse Made Aware yes, Naa   Assessment   Prognosis Fair   Problem List Decreased strength;Decreased endurance;Impaired balance;Decreased mobility;Impaired judgement;Decreased safety awareness;Orthopedic restrictions   Assessment Pt s/p 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 Per RN, wound vac was applied yesterday but subsequently removed. Pt seen for PT re-eval & for insurance auth. Pt require S for bed mobility however require minAx2 for transfers & amb trial w/ RW + cues for techniques & safety. Pt able to maintain NWB to R foot during static standing + cues however unable to maintain NWB during hop-to gait attempt w/ RW. Pt bearing wt to R foot despite max cues hence limit further amb as pt noncompliant w/ WBS. Gait deviations as above but no gross LOB noted.  (+) dizziness reported upon standing & after amb. Noted low BP 97/63. Also noted elevated -120's w/ activity. RN made aware. Dizziness resolved upon resting in chair. Pt tolerated above mentioned thera.ex well. Pt require cues for proper exercise techniques and performance.  Please see flowsheet above for objective findings & levels of assistance required for all functional tasks during this tx session. Nsg staff most recent vital signs as  follows: /54   Pulse 75   Temp 98.2 °F (36.8 °C)   Resp 16   Ht 6' (1.829 m)   Wt 80.4 kg (177 lb 4 oz)   SpO2 100%   BMI 24.04 kg/m² . Will continue PT per POC. At end of session, pt OOB in chair in stable condition, call bell & phone in reach, chair alarm activated, all lines intact. Fall precautions reinforced w/ good understanding. The patient's AM-PAC Basic Mobility Inpatient Short Form Raw Score is 12. A Raw score of less than or equal to 16 suggests the patient may benefit from discharge to post-acute rehabilitation services. Please also refer to the recommendation of the Physical Therapist for safe discharge planning. From PT standpoint, will continue to recommend Level II (moderate resource intensity) rehab services at D/C.  to follow. Nsg staff to continue to mobilized pt (OOB in chair for all meals) as tolerated to prevent decline in function. Will continue to recommend [facility] for  daily OOB in chair as appropriate as well. Nsg notified. Co-tx/eval was necessary to complete this PT tx session for the pt's best interest given pt's medical acuity & complexity   Barriers to Discharge Other (Comment)   Barriers to Discharge Comments pt functioning below baseline; WB restrictions   Goals   Patient Goals to get better   STG Expiration Date 06/08/25   Short Term Goal #1 Goals to be met in 14 days; pt will be able to: 1) inc strength & balance by 1/2 grade to improve overall functional mobility & dec fall risk; 2) inc bed mobility to modified I for pt to be able to get in/OOB safely w/ proper techniques 100% of the time, to dec caregiver burden & safely function at home; 3) inc transfers w/ appropriate AD to S for pt to transition safely from one surface to another w/o % of the time, to dec caregiver burden & safely function at home; 4) inc amb w/ RW approx. >10'x1 w/ minAx1 for pt to ambulate as tolerated w/o any % of the time, to dec caregiver burden & safely function at home;  5) maintain NWB to % of the time; 6) S for w/c mobility & management on level surface approx. >150'; 7) pt/caregiver ed   PT Treatment Day 4   Plan   Treatment/Interventions Functional transfer training;LE strengthening/ROM;Therapeutic exercise;Endurance training;Patient/family training;Bed mobility;Gait training;Spoke to nursing;OT;Spoke to case management   PT Frequency 3-5x/wk   Discharge Recommendation   Rehab Resource Intensity Level, PT II (Moderate Resource Intensity)   Equipment Recommended Wheelchair;Walker   AM-PAC Basic Mobility Inpatient   Turning in Flat Bed Without Bedrails 3   Lying on Back to Sitting on Edge of Flat Bed Without Bedrails 3   Moving Bed to Chair 2   Standing Up From Chair Using Arms 2   Walk in Room 1   Climb 3-5 Stairs With Railing 1   Basic Mobility Inpatient Raw Score 12   Basic Mobility Standardized Score 32.23   [facility] Highest Level Of Mobility   [facility] Goal 4: Move to chair/commode   Exercises   Quad Sets Sitting;15 reps;AROM;Bilateral   Hip Flexion Sitting;15 reps;AROM;Bilateral   Hip Abduction Sitting;15 reps;AROM;Bilateral   Hip Adduction Sitting;15 reps;AROM;Bilateral   Knee AROM Long Arc Quad Sitting;15 reps;AROM;Bilateral   Ankle Pumps Sitting;15 reps;AROM;Bilateral   End of Consult   Patient Position at End of Consult Bedside chair;Bed/Chair alarm activated;All needs within reach   End of Consult Comments Pt in stable condition. All needs in reach. All lines intact. Chair alarm activated.   Wander Webb        [1]   Past Medical History:  Diagnosis Date    Asthma     COVID-19     CVA (cerebral vascular accident) (HCC)     Diabetes mellitus (HCC)     Environmental allergies     Hyperlipidemia     Hypertension     Macular degeneration 07/13/2020    right eye    Orthostatic hypotension     Osteopenia     Pneumonia     Pneumothorax     Sinusitis    [2]   Past Surgical History:  Procedure Laterality Date    CARPAL TUNNEL RELEASE Left 08/2024    CATARACT EXTRACTION  Left 07/2024    COLONOSCOPY      KNEE CARTILAGE SURGERY Right 1964    HI AMPUTATION METATARSAL W/TOE SINGLE Right 5/16/2025    Procedure: RAY RESECTION FOOT - R partial 5th ray resection;  Surgeon: Reinaldo Brewer DPM;  Location: [facility];  Service: Podiatry    HI TEAEC W/WO PATCH GRAFT COMMON FEMORAL Right 5/13/2025    Procedure: Right common femoral endarterectomy with bovine pericardial patch Right lower extremity angiogram Third order cannulation of the right anterior tibial artery Balloon angioplasty of the right anterior tibial with a 3x220 Fernando balloon DCB angioplasty of the SFA with a 6x150 Bryant balloon Stenting of the right SFA with two 6x150 Grazyna stents, 6x5cm Viabahn, and a 7x5cm viabahn Post-di    VASECTOMY      WISDOM TOOTH EXTRACTION      x4

## 2025-05-25 NOTE — ASSESSMENT & PLAN NOTE
Lab Results   Component Value Date    HGBA1C 9.3 (H) 04/09/2025     Recent Labs     05/24/25  1621 05/24/25  2117 05/25/25  0727 05/25/25  1103   POCGLU 273* 286* 147* 359*     Prior to admission meds: glipizide, linagliptin and metformin  A1c 9.3.  Endocrinology consulted by vascular surgery  Oral agents on hold preoperatively  Accu-Cheks and sliding scale insulin

## 2025-05-25 NOTE — PLAN OF CARE
Problem: PHYSICAL THERAPY ADULT  Goal: Performs mobility at highest level of function for planned discharge setting.  See evaluation for individualized goals.  Description: Treatment/Interventions: Functional transfer training, LE strengthening/ROM, Elevations, Therapeutic exercise, Endurance training, Patient/family training, Bed mobility, Gait training, Spoke to nursing, OT  Equipment Recommended: Walker (pt has)       See flowsheet documentation for full assessment, interventions and recommendations.  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 Per RN, wound vac was applied yesterday but subsequently removed. Pt seen for PT re-eval & for insurance auth. Pt require S for bed mobility however require minAx2 for transfers & amb trial w/ RW + cues for techniques & safety. Pt able to maintain NWB to R foot during static standing + cues however unable to maintain NWB during hop-to gait attempt w/ RW. Pt bearing wt to R foot despite max cues hence limit further amb as pt noncompliant w/ WBS. Gait deviations as above but no gross LOB noted.  (+) dizziness reported upon standing & after amb. Noted low BP 97/63. Also noted elevated -120's w/ activity. RN made aware. Dizziness resolved upon resting in chair. Pt tolerated above mentioned thera.ex well. Pt require cues for proper exercise techniques and performance.  Please see flowsheet above for objective findings & levels of assistance required for all functional tasks during this tx session. Nsg staff most recent vital signs as follows: /54   Pulse 75   Temp 98.2 °F (36.8 °C)   Resp 16   Ht 6' (1.829 m)   Wt 80.4 kg (177 lb 4 oz)   SpO2 100%   BMI 24.04 kg/m² . Will continue PT per POC. At end of session, pt OOB in chair in  stable condition, call bell & phone in reach, chair alarm activated, all lines intact. Fall precautions reinforced w/ good understanding. The patient's AM-PAC Basic Mobility Inpatient Short Form Raw Score is 12. A Raw score of less than or equal to 16 suggests the patient may benefit from discharge to post-acute rehabilitation services. Please also refer to the recommendation of the Physical Therapist for safe discharge planning. From PT standpoint, will continue to recommend Level II (moderate resource intensity) rehab services at D/[facility]. CM to follow. Nsg staff to continue to mobilized pt (OOB in chair for all meals) as tolerated to prevent decline in function. Will continue to recommend [facility] for  daily OOB in chair as appropriate as well. Nsg notified. Co-tx/eval was necessary to complete this PT tx session for the pt's best interest given pt's medical acuity & complexity    Barriers to Discharge: Other (Comment)  Barriers to Discharge Comments: pt functioning below baseline; WB restrictions    Rehab Resource Intensity Level, PT: II (Moderate Resource Intensity)    See flowsheet documentation for full assessment.

## 2025-05-25 NOTE — ASSESSMENT & PLAN NOTE
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  Now status post partial fifth ray resection with presumed surgical cure on 5/13.  Patient is status post repeat washout and resection of fifth metatarsal stump, with partial fourth ray resection, with residual bones hard, viable and with bleeding, all are signs of absence of residual osteomyelitis.  As in above, patient does not need long-term antibiotic.  - Antibiotic plan as above  - Wound care per podiatry

## 2025-05-25 NOTE — ASSESSMENT & PLAN NOTE
Admit on initial presentation to [facility] with tachycardia and leukocytosis, secondary to diabetic right foot infection.  Blood cultures negative.  As below patient is with ongoing surgical site infection.  He is afebrile and hemodynamically stable.  -Antibiotic plan as below  -Monitor temperature/WBC  -Supportive care per primary team

## 2025-05-25 NOTE — PLAN OF CARE
Problem: PAIN - ADULT  Goal: Verbalizes/displays adequate comfort level or baseline comfort level  Description: Interventions:- Encourage patient to monitor pain and request assistance- Assess pain using appropriate pain scale- Administer analgesics based on type and severity of pain and evaluate response- Implement non-pharmacological measures as appropriate and evaluate response- Consider cultural and social influences on pain and pain management- Notify physician/advanced practitioner if interventions unsuccessful or patient reports new pain  Outcome: Progressing     Problem: DISCHARGE PLANNING  Goal: Discharge to home or other facility with appropriate resources  Description: INTERVENTIONS:- Identify barriers to discharge w/patient and caregiver- Arrange for needed discharge resources and transportation as appropriate- Identify discharge learning needs (meds, wound care, etc.)- Arrange for interpretive services to assist at discharge as needed- Refer to Case Management Department for coordinating discharge planning if the patient needs post-hospital services based on physician/advanced practitioner order or complex needs related to functional status, cognitive ability, or social support system  Outcome: Progressing     Problem: INFECTION - ADULT  Goal: Absence or prevention of progression during hospitalization  Description: INTERVENTIONS:  - Assess and monitor for signs and symptoms of infection  - Monitor lab/diagnostic results  - Monitor all insertion sites, i.e. indwelling lines, tubes, and drains  - Monitor endotracheal if appropriate and nasal secretions for changes in amount and color  - [place] appropriate cooling/warming therapies per order  - Administer medications as ordered  - Instruct and encourage patient and family to use good hand hygiene technique  - Identify and instruct in appropriate isolation precautions for identified infection/condition  Outcome: Progressing  Goal: Absence of  fever/infection during neutropenic period  Description: INTERVENTIONS:  - Monitor WBC    Outcome: Progressing     Problem: SAFETY ADULT  Goal: Patient will remain free of falls  Description: INTERVENTIONS:  - Educate patient/family on patient safety including physical limitations  - Instruct patient to call for assistance with activity   - Consult OT/PT to assist with strengthening/mobility   - Keep Call bell within reach  - Keep bed low and locked with side rails adjusted as appropriate  - Keep care items and personal belongings within reach  - Initiate and maintain comfort rounds  - Make Fall Risk Sign visible to staff  - Offer Toileting every 2 Hours, in advance of need  - Initiate/Maintain bed alarm  - Obtain necessary fall risk management equipment:   - Apply yellow socks and bracelet for high fall risk patients  - Consider moving patient to room near nurses station  Outcome: Progressing  Goal: Maintain or return to baseline ADL function  Description: INTERVENTIONS:  -  Assess patient's ability to carry out ADLs; assess patient's baseline for ADL function and identify physical deficits which impact ability to perform ADLs (bathing, care of mouth/teeth, toileting, grooming, dressing, etc.)  - Assess/evaluate cause of self-care deficits   - Assess range of motion  - Assess patient's mobility; develop plan if impaired  - Assess patient's need for assistive devices and provide as appropriate  - Encourage maximum independence but intervene and supervise when necessary  - Involve family in performance of ADLs  - Assess for home care needs following discharge   - Consider OT consult to assist with ADL evaluation and planning for discharge  - Provide patient education as appropriate  Outcome: Progressing  Goal: Maintains/Returns to pre admission functional level  Description: INTERVENTIONS:  - Perform AM-PAC 6 Click Basic Mobility/ Daily Activity assessment daily.  - Set and communicate daily mobility goal to care team  and patient/family/caregiver.   - Collaborate with rehabilitation services on mobility goals if consulted  - Perform Range of Motion 4 times a day.  - Reposition patient every 3 hours.  - Dangle patient 3 times a day  - Stand patient 3 times a day  - Ambulate patient 3 times a day  - Out of bed to chair 3 times a day   - Out of bed for meals 3 times a day  - Out of bed for toileting  - Record patient progress and toleration of activity level   Outcome: Progressing     Problem: Knowledge Deficit  Goal: Patient/family/caregiver demonstrates understanding of disease process, treatment plan, medications, and discharge instructions  Description: Complete learning assessment and assess knowledge base.  Interventions:  - Provide teaching at level of understanding  - Provide teaching via preferred learning methods  Outcome: Progressing     Problem: SKIN/TISSUE INTEGRITY - ADULT  Goal: Skin Integrity remains intact(Skin Breakdown Prevention)  Description: Assess:-Perform Sae assessment -Clean and moisturize skin -Inspect skin when repositioning, toileting, and assisting with ADLS-Assess under medical devices -Assess extremities for adequate circulation and sensation Bed Management:-Have minimal linens on bed & keep smooth, unwrinkled-Change linens as needed when moist or perspiring-Avoid sitting or lying in one position for more than 2 hours while in bed-Keep HOB at 30 degrees Toileting:-Offer bedside commode-Assess for incontinence -Use incontinent care products after each incontinent episode Activity:-Mobilize patient 3 times a day-Encourage activity and walks on unit-Encourage or provide ROM exercises -Turn and reposition patient every 2 Hours-Use appropriate equipment to lift or move patient in bed-Instruct/ Assist with weight shifting  when out of bed in chair-Consider limitation of chair time 2 hour intervalsSkin Care:-Avoid use of baby powder, tape, friction and shearing, hot water or constrictive clothing-Relieve  pressure over bony prominences Do not massage red bony areasNext Steps:-Teach patient strategies to minimize risks Consider consults to  interdisciplinary teams   Outcome: Progressing  Goal: Incision(s), wounds(s) or drain site(s) healing without S/S of infection  Description: INTERVENTIONS- Assess and document dressing, incision, wound bed, drain sites and surrounding tissue- Provide patient and family education- Perform skin care/dressing changes every shift  Outcome: Progressing  Goal: Pressure injury heals and does not worsen  Description: Interventions:- Implement low air loss mattress or specialty surface (Criteria met)- Apply silicone foam dressing- Instruct/assist with weight shifting every 120 minutes when in chair - Limit chair time to 2 hour intervals- Use special pressure reducing interventions such as turning when in chair - Apply fecal or urinary incontinence containment device - Perform passive or active ROM every 2 hours- Turn and reposition patient & offload bony prominences every 2 hours - Utilize friction reducing device or surface for transfers - Consider consults to  interdisciplinary teams such as wound- Use incontinent care products after each incontinent episode such as wipes- Consider nutrition services referral as needed  Outcome: Progressing     Problem: METABOLIC, FLUID AND ELECTROLYTES - ADULT  Goal: Electrolytes maintained within normal limits  Description: INTERVENTIONS:  - Monitor labs and assess patient for signs and symptoms of electrolyte imbalances  - Administer electrolyte replacement as ordered  - Monitor response to electrolyte replacements, including repeat lab results as appropriate  - Instruct patient on fluid and nutrition as appropriate  Outcome: Progressing     Problem: HEMATOLOGIC - ADULT  Goal: Maintains hematologic stability  Description: INTERVENTIONS  - Assess for signs and symptoms of bleeding or hemorrhage  - Monitor labs  - Administer supportive blood  products/factors as ordered and appropriate  Outcome: Progressing     Problem: CARDIOVASCULAR - ADULT  Goal: Maintains optimal cardiac output and hemodynamic stability  Description: INTERVENTIONS:  - Monitor I/O, vital signs and rhythm  - Monitor for S/S and trends of decreased cardiac output  - Administer and titrate ordered vasoactive medications to optimize hemodynamic stability  - Assess quality of pulses, skin color and temperature  - Assess for signs of decreased coronary artery perfusion  - Instruct patient to report change in severity of symptoms  Outcome: Progressing  Goal: Absence of cardiac dysrhythmias or at baseline rhythm  Description: INTERVENTIONS:  - Continuous cardiac monitoring, vital signs, obtain 12 lead EKG if ordered  - Administer antiarrhythmic and heart rate control medications as ordered  - Monitor electrolytes and administer replacement therapy as ordered  Outcome: Progressing     Problem: Nutrition/Hydration-ADULT  Goal: Nutrient/Hydration intake appropriate for improving, restoring or maintaining nutritional needs  Description: Monitor and assess patient's nutrition/hydration status for malnutrition. Collaborate with interdisciplinary team and initiate plan and interventions as ordered.  Monitor patient's weight and dietary intake as ordered or per policy. Utilize nutrition screening tool and intervene as necessary. Determine patient's food preferences and provide high-protein, high-caloric foods as appropriate. INTERVENTIONS:- Monitor oral intake, urinary output, labs, and treatment plans- Assess nutrition and hydration status and recommend course of action- Evaluate amount of meals eaten- Assist patient with eating if necessary - Allow adequate time for meals- Recommend/ encourage appropriate diets, oral nutritional supplements, and vitamin/mineral supplements- Order, calculate, and assess calorie counts as needed- Recommend, monitor, and adjust tube feedings and TPN/PPN based on  assessed needs- Assess need for intravenous fluids- Provide specific nutrition/hydration education as appropriate- Include patient/family/caregiver in decisions related to nutrition  Outcome: Progressing     Problem: Prexisting or High Potential for Compromised Skin Integrity  Goal: Skin integrity is maintained or improved  Description: INTERVENTIONS:  - Identify patients at risk for skin breakdown  - Assess and monitor skin integrity  - Assess and monitor nutrition and hydration status  - Monitor labs   - Assess for incontinence   - Turn and reposition patient  - Assist with mobility/ambulation  - Relieve pressure over bony prominences  - Avoid friction and shearing  - Provide appropriate hygiene as needed including keeping skin clean and dry  - Evaluate need for skin moisturizer/barrier cream  - Collaborate with interdisciplinary team   - Patient/family teaching  - Consider wound care consult   Outcome: Progressing

## 2025-05-25 NOTE — PROGRESS NOTES
Progress Note - Hospitalist   Name: Gold Van 79 y.o. male I MRN: [id number]  Unit/Bed#: [place] [address] -01 I Date of Admission: 5/11/2025   Date of Service: 5/25/2025 I Hospital Day: 14    Assessment & Plan  Sepsis without acute organ dysfunction (HCC)  Patient is a 79-year-old male with past medical history significant for DM2, PAF, asthma, and PAD who presented with right foot wound  Met sepsis criteria at [facility] initially  Diagnosed with right fifth metatarsal osteomyelitis with right foot cellulitis, started on abx and transferred here for vascular surgery  S/p vascular surgery and right fifth ray amputation 5/16  Status post fourth ray resection 5/20  intraoperative cultures: Serratia/Enterococcus and pseudomonas  Blood culture negative x 2  Appreciate evaluation and recommendations from infectious disease team: Continue antibiotics with Zosyn  Per ID: On IV Zosyn: At discharge recommend 7 days postop antibiotic with p.o. amoxicillin 500 mg every 8 hours plus p.o. Levaquin 750 mg daily: Last day antibiotics 5/30  Will need short-term rehab  Troponin I above reference range  Patient developed acute onset of dyspnea evening of 5/18 after blood transfusion  EKG with transient lateral ST depression, and elevated trops  Appreciate Cardiology eval: elevated trop due to volume overload  On review of CT scan patient did have quite significant coronary calcifications, however NStress 1/25 neg for ischemia  Continue medical therapy with Plavix, statin, and beta-blocker  Continue medical therapy, appreciate cardiology evaluation and recommendations  Volume overload  Patient had episode of dyspnea after blood transfusion   Most likely volume overload secondary to blood transfusions, IV fluids/antibiotics, in the setting of chronic hpf EF  Chest x-ray: Moderate interstitial edema  5/2025 echocardiogram: LVEF 50%.  Grade 1 DD  Patient was diuresed with IV Lasix and improved  Per cardiology: Monitor off diuretics,  euvolemic  Severe peripheral arterial disease (HCC)  Transferred here for vascular surgery intervention  Underwent right CFA SFA endarterectomy/stenting 5/13/2025  Aspirin discontinued.  Started on clopidogrel for stents  Continue statin  Patient is also on Eliquis for paroxysmal atrial fibrillation  Discussed with vascular surgery team: No additional intervention required from their standpoint they will see in office follow-up  Osteomyelitis (HCC)  Right fifth metatarsal ulceration with underlying osteomyelitis and surrounding right foot cellulitis  5/16: Underwent right partial fifth ray resection  5/20 right partial fourth ray amputation, wound debridement  cultures with Serratia/Enterococcus, and Pseudomonas  Antibiotics changed to IV Zosyn, with transition to oral amoxicillin/Levaquin for 7-day postop course through 5/30  Postoperative anemia  Prior baseline hemoglobin appears to range 10-12  Postprocedure hemoglobin decreased to 7.0 when patient was symptomatic: Secondary to expected procedural blood loss  On 5/18 transfused 1 unit PRBC   Repeat hemoglobin 8-9: Oozing noted at surgical site  Continue to monitor    Results from last 7 days   Lab Units 05/25/25  0540 05/24/25  0459 05/23/25  1602 05/23/25  0503   HEMOGLOBIN g/dL 8.1* 8.5* 9.7* 8.8*   MCV fL 92 94  --  91     Type 2 diabetes mellitus with complication, without long-term current use of insulin ([facility])  Lab Results   Component Value Date    HGBA1C 9.3 (H) 04/09/2025     Recent Labs     05/24/25  1621 05/24/25  2117 05/25/25  0727 05/25/25  1103   POCGLU 273* 286* 147* 359*     Prior to admission meds: glipizide, linagliptin and metformin  A1c 9.3.  Endocrinology consulted by vascular surgery  Oral agents on hold preoperatively  Accu-Cheks and sliding scale insulin  PAF (paroxysmal atrial fibrillation) ([facility])  Follows with [facility] cardiology:   Continue atenolol 25 mg daily.  Anticoagulation: Apixaban   Continue beta-blocker (changed hold parameters  "due to episode of rapid heart rate when beta-blocker was held)  Currently in sinus rhythm  Essential hypertension  Continue atenolol 25 mg daily  Decrease lisinopril to 5 mg daily due to borderline blood pressure  Home amlodipine on hold due to low-normal blood pressures  Continue to monitor  Mild intermittent asthma without complication  Prior to admission on [facility].  No exacerbation  Albuterol metered-dose inhaler as needed  Pruritus  Chronic pruritus follows with dermatology as an outpatient on prednisone 5 mg daily  Chronic heart failure with preserved ejection fraction (HFpEF) (HCC)  Last known LVEF 50% echocardiogram 2024  Patient being treated for volume overload secondary to blood transfusion/IV fluids as described above  Lung nodule  Chest x-ray with incidental finding of \"nodular opacity at left base\"  Outpatient chest x-ray in 6 weeks    VTE Pharmacologic Prophylaxis: VTE Score: 5 High Risk (Score >/= 5) - Pharmacological DVT Prophylaxis Ordered: dabigatran (Pradaxa). Sequential Compression Devices Ordered.    Mobility:   Basic Mobility Inpatient Raw Score: 12  JH-HLM Goal: 4: Move to chair/commode  JH-HLM Achieved: 4: Move to chair/commode  JH-HLM Goal achieved. Continue to encourage appropriate mobility.    Patient Centered Rounds: I performed bedside rounds with nursing staff today.   Discussions with Specialists or Other Care Team Provider: BERTA    Education and Discussions with Family / Patient: Updated patient and wife at the bedside.  Reviewed test results, treatment plan.  Answered all questions    Current Length of Stay: 14 day(s)  Current Patient Status: Inpatient   Certification Statement: The patient will continue to require additional inpatient hospital stay due to postoperative podiatry follow-up, evaluation for wound VAC placement, short-term rehab arrangements  Discharge Plan: Anticipate discharge in 48-72 hrs to rehab facility.    Code Status: Level 1 - Full Code    Subjective   Patient " denies any current complaints.  Notes his pain in his right foot worsens with ambulating and working with physical therapy however his pain medications provide relief.  Notes current pain is controlled and declines any pain medicine.    Denies any pain anywhere else besides his foot recently.  Denies any chest pain or chest pressure.  Denies any back pain.  Denies any abdominal pain.  Denies any shortness of breath.  He notes chronic dyspnea on exertion, at baseline for which he uses his albuterol inhaler.  Denies any nausea, vomiting, diarrhea.  Is tolerating p.o.  Denies any dizziness or lightheadedness.  Ambulated with physical therapy today to the chair    Objective :  Temp:  [97.8 °F (36.6 °C)-98.6 °F (37 °C)] 98.2 °F (36.8 °C)  HR:  [] 75  BP: ()/(54-73) 105/54  Resp:  [16-19] 16  SpO2:  [96 %-100 %] 100 %  O2 Device: None (Room air)    Body mass index is 24.04 kg/m².     Input and Output Summary (last 24 hours):     Intake/Output Summary (Last 24 hours) at 5/25/2025 1221  Last data filed at 5/25/2025 0558  Gross per 24 hour   Intake 600 ml   Output 800 ml   Net -200 ml       Physical Exam  General: Pleasant male.  No acute distress.  Nontachypneic and nondyspneic.  Currently sitting at a recliner at the bedside.  Wife present  Heart: Regular rate and rhythm.  S1-S2 present.  No murmur, rub, gallop  Lungs: Clear to auscultation bilaterally.  Good air movement.  No wheezes, crackles, rhonchi.  No accessory muscle use or respiratory distress  Abdomen: Soft, nontender with palpation.  Nondistended.  Normal active bowel sounds present.  No guarding or rebound.  No peritoneal signs or mass  Extremities: Right lower extremity dressing in place  Neurologic: Awake and alert.  Oriented.  Interactive.  Sitting in a chair    Lines/Drains:              Lab Results: I have reviewed the following results:   Results from last 7 days   Lab Units 05/25/25  0540   WBC Thousand/uL 9.56   HEMOGLOBIN g/dL 8.1*    HEMATOCRIT % 25.4*   PLATELETS Thousands/uL 541*   SEGS PCT % 69   LYMPHO PCT % 18   MONO PCT % 9   EOS PCT % 2     Results from last 7 days   Lab Units 25  0540   SODIUM mmol/L 135   POTASSIUM mmol/L 4.6   CHLORIDE mmol/L 105   CO2 mmol/L 25   BUN mg/dL 9   CREATININE mg/dL 0.91   ANION GAP mmol/L 5   CALCIUM mg/dL 8.3*   GLUCOSE RANDOM mg/dL 155*         Results from last 7 days   Lab Units 25  1103 25  0727 25  2117 25  1621 25  1145 25  0725 25  2052 25  1606 25  1513 25  1111 25  0738 25   POC GLUCOSE mg/dl 359* 147* 286* 273* 293* 230* 209* 188* 191* 184* 170* 254*               Recent Cultures (last 7 days):         Imagin/18 chest x-ray  moderate interstitial edema.  Nodular opacity at the left base, not visible on prior studies. Recommend follow-up with a chest radiograph with dual energy subtraction in 6 weeks to assure resolution      right foot x-rayPostop right foot.  No acute osseous abnormality.      vein mapping  RIGHT LOWER LIMB:   The great saphenous vein is patent  from the groin to the ankle.   The intraluminal diameter measurements range from 2.2 mm to 12.6 mm throughout.   LEFT LOWER LIMB:   The great saphenous vein is patent  from the groin to the ankle.   The intraluminal diameter measurements range from 2.1 mm to 9 mm throughout.       right foot x-ray: Ulceration along the lateral aspect of the foot and soft tissue swelling along the dorsum of the foot without radiographic findings of osteomyelitis.       venous duplex  RIGHT LOWER LIMB   No evidence of acute or chronic deep vein thrombosis.   No evidence of superficial thrombophlebitis noted.   Doppler evaluation shows a normal response to augmentation maneuvers.   Popliteal, posterior tibial and anterior tibial arterial Doppler waveforms are monophasic (Known PAD).   Note: There is a well-defined hypoechoic non-vascularized  cystic-type structure noted in the popliteal fossa.      LEFT LOWER LIMB LIMITED   Evaluation shows no evidence of thrombus in the common femoral vein.    Doppler evaluation shows a normal response to augmentation maneuvers.     5/6 CTA abdomen with runoff  1. Focal high-grade stenosis of distal right CFA and diffuse atherosclerotic disease of right SFA resulting in moderate to severe stenoses with focal near complete occlusions at the proximal and distal segments.  2. Short segment occlusions of proximal right anterior tibial and peroneal arteries with reconstitution in the medial to distal segments. Right posterior tibial artery is occluded.  3. Occluded left SFA with reconstitution in the distal segment. Left anterior tibial and posterior tibial arteries are occluded. One-vessel runoff of the left lower extremity through the peroneal artery.  4. Focal high-grade stenosis at the proximal right renal artery.        Microbiology  5/16 anaerobic culture: Negative  5/16 wound culture: 3+ Serratia.  3+ Enterococcus faecalis,pseudomonas.  5/7 blood culture: Negative x 2       Last 24 Hours Medication List:     Current Facility-Administered Medications:     acetaminophen (TYLENOL) tablet 975 mg, Q8H SANDRA    albuterol (PROVENTIL HFA,VENTOLIN HFA) inhaler 1 puff, Q4H PRN    [Held by provider] amLODIPine (NORVASC) tablet 5 mg, BID    apixaban (ELIQUIS) tablet 5 mg, BID    atenolol (TENORMIN) tablet 25 mg, Daily    atorvastatin (LIPITOR) tablet 40 mg, QPM    Budeson-Glycopyrrol-Formoterol 160-9-4.8 MCG/ACT AERO 2 puff, BID    clopidogrel (PLAVIX) tablet 75 mg, Daily    docusate sodium (COLACE) capsule 100 mg, BID    famotidine (PEPCID) tablet 20 mg, BID    glipiZIDE (GLUCOTROL XL) 24 hr tablet 10 mg, BID AC    insulin lispro (HumALOG/ADMELOG) 100 units/mL subcutaneous injection 1-5 Units, TID AC **AND** Fingerstick Glucose (POCT), TID AC    insulin lispro (HumALOG/ADMELOG) 100 units/mL subcutaneous injection 1-5 Units, HS     levalbuterol (XOPENEX) inhalation solution 1.25 mg, Q6H PRN    lidocaine (LIDODERM) 5 % patch 1 patch, Daily    lisinopril (ZESTRIL) tablet 20 mg, BID    [Held by provider] metFORMIN (GLUCOPHAGE-XR) 24 hr tablet 500 mg, Daily With Dinner    metoprolol (LOPRESSOR) injection 5 mg, Q6H PRN    montelukast (SINGULAIR) tablet 10 mg, HS    morphine injection 2 mg, Q4H PRN    multivitamin-minerals (CENTRUM) tablet 1 tablet, Daily    ondansetron (ZOFRAN) injection 4 mg, Q6H PRN    oxyCODONE (ROXICODONE) IR tablet 5 mg, Q4H PRN **OR** oxyCODONE (ROXICODONE) immediate release tablet 10 mg, Q4H PRN    [COMPLETED] piperacillin-tazobactam (ZOSYN) 4.5 g in sodium chloride 0.9 % 100 mL IV LOADING DOSE, Once, Last Rate: Stopped (05/20/25 1245) **FOLLOWED BY** piperacillin-tazobactam (ZOSYN) 4.5 g in sodium chloride 0.9 % 100 mL IVPB (EXTENDED INFUSION), Q8H, Last Rate: 4.5 g (05/25/25 0611)    polyethylene glycol (MIRALAX) packet 17 g, Daily PRN    predniSONE tablet 5 mg, Daily    senna (SENOKOT) tablet 8.6 mg, Daily    Administrative Statements   Today, Patient Was Seen By: Zulema Pitt MD      **Please Note: This note may have been constructed using a voice recognition system.**

## 2025-05-26 LAB
ABO GROUP BLD: NORMAL
ANION GAP SERPL CALCULATED.3IONS-SCNC: 5 MMOL/L (ref 4–13)
BASOPHILS # BLD AUTO: 0.05 THOUSANDS/ÂΜL (ref 0–0.1)
BASOPHILS NFR BLD AUTO: 1 % (ref 0–1)
BLD GP AB SCN SERPL QL: NEGATIVE
BUN SERPL-MCNC: 10 MG/DL (ref 5–25)
CALCIUM SERPL-MCNC: 8.4 MG/DL (ref 8.4–10.2)
CHLORIDE SERPL-SCNC: 105 MMOL/L (ref 96–108)
CO2 SERPL-SCNC: 25 MMOL/L (ref 21–32)
CREAT SERPL-MCNC: 0.91 MG/DL (ref 0.6–1.3)
EOSINOPHIL # BLD AUTO: 0.1 THOUSAND/ÂΜL (ref 0–0.61)
EOSINOPHIL NFR BLD AUTO: 1 % (ref 0–6)
ERYTHROCYTE [DISTWIDTH] IN BLOOD BY AUTOMATED COUNT: 16.3 % (ref 11.6–15.1)
GFR SERPL CREATININE-BSD FRML MDRD: 79 ML/MIN/1.73SQ M
GLUCOSE SERPL-MCNC: 156 MG/DL (ref 65–140)
GLUCOSE SERPL-MCNC: 170 MG/DL (ref 65–140)
GLUCOSE SERPL-MCNC: 231 MG/DL (ref 65–140)
GLUCOSE SERPL-MCNC: 285 MG/DL (ref 65–140)
GLUCOSE SERPL-MCNC: 293 MG/DL (ref 65–140)
HCT VFR BLD AUTO: 22.1 % (ref 36.5–49.3)
HGB BLD-MCNC: 6.9 G/DL (ref 12–17)
IMM GRANULOCYTES # BLD AUTO: 0.1 THOUSAND/UL (ref 0–0.2)
IMM GRANULOCYTES NFR BLD AUTO: 1 % (ref 0–2)
LYMPHOCYTES # BLD AUTO: 1.56 THOUSANDS/ÂΜL (ref 0.6–4.47)
LYMPHOCYTES NFR BLD AUTO: 18 % (ref 14–44)
MCH RBC QN AUTO: 29.1 PG (ref 26.8–34.3)
MCHC RBC AUTO-ENTMCNC: 31.2 G/DL (ref 31.4–37.4)
MCV RBC AUTO: 93 FL (ref 82–98)
MONOCYTES # BLD AUTO: 0.67 THOUSAND/ÂΜL (ref 0.17–1.22)
MONOCYTES NFR BLD AUTO: 8 % (ref 4–12)
NEUTROPHILS # BLD AUTO: 6 THOUSANDS/ÂΜL (ref 1.85–7.62)
NEUTS SEG NFR BLD AUTO: 71 % (ref 43–75)
NRBC BLD AUTO-RTO: 0 /100 WBCS
PLATELET # BLD AUTO: 526 THOUSANDS/UL (ref 149–390)
PMV BLD AUTO: 8.9 FL (ref 8.9–12.7)
POTASSIUM SERPL-SCNC: 4.5 MMOL/L (ref 3.5–5.3)
RBC # BLD AUTO: 2.37 MILLION/UL (ref 3.88–5.62)
RH BLD: POSITIVE
SODIUM SERPL-SCNC: 135 MMOL/L (ref 135–147)
SPECIMEN EXPIRATION DATE: NORMAL
WBC # BLD AUTO: 8.48 THOUSAND/UL (ref 4.31–10.16)

## 2025-05-26 PROCEDURE — 99024 POSTOP FOLLOW-UP VISIT: CPT | Performed by: PODIATRIST

## 2025-05-26 PROCEDURE — 86901 BLOOD TYPING SEROLOGIC RH(D): CPT | Performed by: INTERNAL MEDICINE

## 2025-05-26 PROCEDURE — 30233N1 TRANSFUSION OF NONAUTOLOGOUS RED BLOOD CELLS INTO PERIPHERAL VEIN, PERCUTANEOUS APPROACH: ICD-10-PCS

## 2025-05-26 PROCEDURE — G0545 PR INHERENT VISIT TO INPT: HCPCS | Performed by: INTERNAL MEDICINE

## 2025-05-26 PROCEDURE — 99232 SBSQ HOSP IP/OBS MODERATE 35: CPT | Performed by: INTERNAL MEDICINE

## 2025-05-26 PROCEDURE — 86850 RBC ANTIBODY SCREEN: CPT | Performed by: INTERNAL MEDICINE

## 2025-05-26 PROCEDURE — P9016 RBC LEUKOCYTES REDUCED: HCPCS

## 2025-05-26 PROCEDURE — 85025 COMPLETE CBC W/AUTO DIFF WBC: CPT | Performed by: INTERNAL MEDICINE

## 2025-05-26 PROCEDURE — 86900 BLOOD TYPING SEROLOGIC ABO: CPT | Performed by: INTERNAL MEDICINE

## 2025-05-26 PROCEDURE — 82948 REAGENT STRIP/BLOOD GLUCOSE: CPT

## 2025-05-26 PROCEDURE — 80048 BASIC METABOLIC PNL TOTAL CA: CPT | Performed by: INTERNAL MEDICINE

## 2025-05-26 RX ORDER — FUROSEMIDE 10 MG/ML
40 INJECTION INTRAMUSCULAR; INTRAVENOUS ONCE
Status: COMPLETED | OUTPATIENT
Start: 2025-05-26 | End: 2025-05-26

## 2025-05-26 RX ORDER — ACETAMINOPHEN 325 MG/1
650 TABLET ORAL EVERY 8 HOURS SCHEDULED
Status: DISCONTINUED | OUTPATIENT
Start: 2025-05-26 | End: 2025-05-28 | Stop reason: HOSPADM

## 2025-05-26 RX ADMIN — MONTELUKAST 10 MG: 10 TABLET, FILM COATED ORAL at 22:42

## 2025-05-26 RX ADMIN — ACETAMINOPHEN 650 MG: 325 TABLET, FILM COATED ORAL at 22:44

## 2025-05-26 RX ADMIN — PIPERACILLIN AND TAZOBACTAM 4.5 G: 36; 4.5 INJECTION, POWDER, LYOPHILIZED, FOR SOLUTION INTRAVENOUS at 16:46

## 2025-05-26 RX ADMIN — SENNOSIDES 8.6 MG: 8.6 TABLET, FILM COATED ORAL at 08:23

## 2025-05-26 RX ADMIN — PIPERACILLIN AND TAZOBACTAM 4.5 G: 36; 4.5 INJECTION, POWDER, LYOPHILIZED, FOR SOLUTION INTRAVENOUS at 06:11

## 2025-05-26 RX ADMIN — FAMOTIDINE 20 MG: 20 TABLET, FILM COATED ORAL at 08:22

## 2025-05-26 RX ADMIN — BUDESONIDE, GLYCOPYRROLATE, AND FORMOTEROL FUMARATE 2 PUFF: 160; 9; 4.8 AEROSOL, METERED RESPIRATORY (INHALATION) at 09:21

## 2025-05-26 RX ADMIN — FAMOTIDINE 20 MG: 20 TABLET, FILM COATED ORAL at 17:05

## 2025-05-26 RX ADMIN — DOCUSATE SODIUM 100 MG: 100 CAPSULE, LIQUID FILLED ORAL at 08:22

## 2025-05-26 RX ADMIN — LISINOPRIL 5 MG: 5 TABLET ORAL at 08:26

## 2025-05-26 RX ADMIN — LISINOPRIL 5 MG: 5 TABLET ORAL at 22:43

## 2025-05-26 RX ADMIN — INSULIN LISPRO 1 UNITS: 100 INJECTION, SOLUTION INTRAVENOUS; SUBCUTANEOUS at 09:19

## 2025-05-26 RX ADMIN — ATORVASTATIN CALCIUM 40 MG: 40 TABLET, FILM COATED ORAL at 17:05

## 2025-05-26 RX ADMIN — OXYCODONE HYDROCHLORIDE 10 MG: 10 TABLET ORAL at 22:43

## 2025-05-26 RX ADMIN — INSULIN LISPRO 3 UNITS: 100 INJECTION, SOLUTION INTRAVENOUS; SUBCUTANEOUS at 22:43

## 2025-05-26 RX ADMIN — ACETAMINOPHEN 975 MG: 325 TABLET, FILM COATED ORAL at 05:16

## 2025-05-26 RX ADMIN — GLIPIZIDE 10 MG: 5 TABLET, FILM COATED, EXTENDED RELEASE ORAL at 16:46

## 2025-05-26 RX ADMIN — GLIPIZIDE 10 MG: 5 TABLET, FILM COATED, EXTENDED RELEASE ORAL at 08:22

## 2025-05-26 RX ADMIN — PREDNISONE 5 MG: 5 TABLET ORAL at 08:22

## 2025-05-26 RX ADMIN — ATENOLOL 25 MG: 50 TABLET ORAL at 08:26

## 2025-05-26 RX ADMIN — Medication 1 TABLET: at 08:26

## 2025-05-26 RX ADMIN — OXYCODONE HYDROCHLORIDE 10 MG: 10 TABLET ORAL at 08:27

## 2025-05-26 RX ADMIN — INSULIN LISPRO 2 UNITS: 100 INJECTION, SOLUTION INTRAVENOUS; SUBCUTANEOUS at 11:48

## 2025-05-26 RX ADMIN — LIDOCAINE 1 PATCH: 50 PATCH CUTANEOUS at 22:44

## 2025-05-26 RX ADMIN — BUDESONIDE, GLYCOPYRROLATE, AND FORMOTEROL FUMARATE 2 PUFF: 160; 9; 4.8 AEROSOL, METERED RESPIRATORY (INHALATION) at 22:45

## 2025-05-26 RX ADMIN — CLOPIDOGREL BISULFATE 75 MG: 75 TABLET, FILM COATED ORAL at 08:26

## 2025-05-26 RX ADMIN — APIXABAN 5 MG: 5 TABLET, FILM COATED ORAL at 08:27

## 2025-05-26 RX ADMIN — INSULIN LISPRO 3 UNITS: 100 INJECTION, SOLUTION INTRAVENOUS; SUBCUTANEOUS at 17:05

## 2025-05-26 RX ADMIN — DOCUSATE SODIUM 100 MG: 100 CAPSULE, LIQUID FILLED ORAL at 17:05

## 2025-05-26 RX ADMIN — APIXABAN 5 MG: 5 TABLET, FILM COATED ORAL at 17:05

## 2025-05-26 RX ADMIN — FUROSEMIDE 40 MG: 10 INJECTION, SOLUTION INTRAVENOUS at 16:46

## 2025-05-26 NOTE — ASSESSMENT & PLAN NOTE
Right fifth metatarsal ulceration with underlying osteomyelitis and surrounding right foot cellulitis  5/16: Underwent right partial fifth ray resection  5/20 right partial fourth ray amputation, wound debridement  Intraoperative cultures with Serratia/Enterococcus, and Pseudomonas  Antibiotics as outlined above

## 2025-05-26 NOTE — ASSESSMENT & PLAN NOTE
Continue atenolol 25 mg daily  Due to low blood pressures lisinopril decreased to 5 mg twice daily and holding amlodipine

## 2025-05-26 NOTE — ASSESSMENT & PLAN NOTE
Lab Results   Component Value Date    HGBA1C 9.3 (H) 04/09/2025     Recent Labs     05/25/25  1603 05/25/25  2103 05/26/25  0741 05/26/25  1115   POCGLU 278* 260* 156* 231*     Prior to admission meds: glipizide, linagliptin and metformin  A1c 9.3.  Endocrinology consulted by vascular surgery  Close currently stable continue glipizide 10 mg twice daily metformin, and sliding scale

## 2025-05-26 NOTE — PLAN OF CARE
Problem: PAIN - ADULT  Goal: Verbalizes/displays adequate comfort level or baseline comfort level  Description: Interventions:- Encourage patient to monitor pain and request assistance- Assess pain using appropriate pain scale- Administer analgesics based on type and severity of pain and evaluate response- Implement non-pharmacological measures as appropriate and evaluate response- Consider cultural and social influences on pain and pain management- Notify physician/advanced practitioner if interventions unsuccessful or patient reports new pain  Outcome: Progressing     Problem: DISCHARGE PLANNING  Goal: Discharge to home or other facility with appropriate resources  Description: INTERVENTIONS:- Identify barriers to discharge w/patient and caregiver- Arrange for needed discharge resources and transportation as appropriate- Identify discharge learning needs (meds, wound care, etc.)- Arrange for interpretive services to assist at discharge as needed- Refer to Case Management Department for coordinating discharge planning if the patient needs post-hospital services based on physician/advanced practitioner order or complex needs related to functional status, cognitive ability, or social support system  Outcome: Progressing     Problem: SKIN/TISSUE INTEGRITY - ADULT  Goal: Skin Integrity remains intact(Skin Breakdown Prevention)  Description: Assess:-Perform Sae assessment -Clean and moisturize skin -Inspect skin when repositioning, toileting, and assisting with ADLS-Assess under medical devices -Assess extremities for adequate circulation and sensation Bed Management:-Have minimal linens on bed & keep smooth, unwrinkled-Change linens as needed when moist or perspiring-Avoid sitting or lying in one position for more than 2 hours while in bed-Keep HOB at 30 degrees Toileting:-Offer bedside commode-Assess for incontinence -Use incontinent care products after each incontinent episode Activity:-Mobilize patient 3 times  a day-Encourage activity and walks on unit-Encourage or provide ROM exercises -Turn and reposition patient every 2 Hours-Use appropriate equipment to lift or move patient in bed-Instruct/ Assist with weight shifting  when out of bed in chair-Consider limitation of chair time 2 hour intervalsSkin Care:-Avoid use of baby powder, tape, friction and shearing, hot water or constrictive clothing-Relieve pressure over bony prominences Do not massage red bony areasNext Steps:-Teach patient strategies to minimize risks Consider consults to  interdisciplinary teams   Outcome: Progressing  Goal: Incision(s), wounds(s) or drain site(s) healing without S/S of infection  Description: INTERVENTIONS- Assess and document dressing, incision, wound bed, drain sites and surrounding tissue- Provide patient and family education- Perform skin care/dressing changes every shift  Outcome: Progressing  Goal: Pressure injury heals and does not worsen  Description: Interventions:- Implement low air loss mattress or specialty surface (Criteria met)- Apply silicone foam dressing- Instruct/assist with weight shifting every 120 minutes when in chair - Limit chair time to 2 hour intervals- Use special pressure reducing interventions such as turning when in chair - Apply fecal or urinary incontinence containment device - Perform passive or active ROM every 2 hours- Turn and reposition patient & offload bony prominences every 2 hours - Utilize friction reducing device or surface for transfers - Consider consults to  interdisciplinary teams such as wound- Use incontinent care products after each incontinent episode such as wipes- Consider nutrition services referral as needed  Outcome: Progressing     Problem: Nutrition/Hydration-ADULT  Goal: Nutrient/Hydration intake appropriate for improving, restoring or maintaining nutritional needs  Description: Monitor and assess patient's nutrition/hydration status for malnutrition. Collaborate with  interdisciplinary team and initiate plan and interventions as ordered.  Monitor patient's weight and dietary intake as ordered or per policy. Utilize nutrition screening tool and intervene as necessary. Determine patient's food preferences and provide high-protein, high-caloric foods as appropriate. INTERVENTIONS:- Monitor oral intake, urinary output, labs, and treatment plans- Assess nutrition and hydration status and recommend course of action- Evaluate amount of meals eaten- Assist patient with eating if necessary - Allow adequate time for meals- Recommend/ encourage appropriate diets, oral nutritional supplements, and vitamin/mineral supplements- Order, calculate, and assess calorie counts as needed- Recommend, monitor, and adjust tube feedings and TPN/PPN based on assessed needs- Assess need for intravenous fluids- Provide specific nutrition/hydration education as appropriate- Include patient/family/caregiver in decisions related to nutrition  Outcome: Progressing     Problem: Prexisting or High Potential for Compromised Skin Integrity  Goal: Skin integrity is maintained or improved  Description: INTERVENTIONS:  - Identify patients at risk for skin breakdown  - Assess and monitor skin integrity  - Assess and monitor nutrition and hydration status  - Monitor labs   - Assess for incontinence   - Turn and reposition patient  - Assist with mobility/ambulation  - Relieve pressure over bony prominences  - Avoid friction and shearing  - Provide appropriate hygiene as needed including keeping skin clean and dry  - Evaluate need for skin moisturizer/barrier cream  - Collaborate with interdisciplinary team   - Patient/family teaching  - Consider wound care consult   Outcome: Progressing     Problem: INFECTION - ADULT  Goal: Absence or prevention of progression during hospitalization  Description: INTERVENTIONS:  - Assess and monitor for signs and symptoms of infection  - Monitor lab/diagnostic results  - Monitor all  insertion sites, i.e. indwelling lines, tubes, and drains  - Monitor endotracheal if appropriate and nasal secretions for changes in amount and color  - [place] appropriate cooling/warming therapies per order  - Administer medications as ordered  - Instruct and encourage patient and family to use good hand hygiene technique  - Identify and instruct in appropriate isolation precautions for identified infection/condition  Outcome: Progressing  Goal: Absence of fever/infection during neutropenic period  Description: INTERVENTIONS:  - Monitor WBC    Outcome: Progressing     Problem: SAFETY ADULT  Goal: Patient will remain free of falls  Description: INTERVENTIONS:  - Educate patient/family on patient safety including physical limitations  - Instruct patient to call for assistance with activity   - Consult OT/PT to assist with strengthening/mobility   - Keep Call bell within reach  - Keep bed low and locked with side rails adjusted as appropriate  - Keep care items and personal belongings within reach  - Initiate and maintain comfort rounds  - Make Fall Risk Sign visible to staff  - Offer Toileting every 2 Hours, in advance of need  - Initiate/Maintain bed alarm  - Obtain necessary fall risk management equipment:   - Apply yellow socks and bracelet for high fall risk patients  - Consider moving patient to room near nurses station  Outcome: Progressing  Goal: Maintain or return to baseline ADL function  Description: INTERVENTIONS:  -  Assess patient's ability to carry out ADLs; assess patient's baseline for ADL function and identify physical deficits which impact ability to perform ADLs (bathing, care of mouth/teeth, toileting, grooming, dressing, etc.)  - Assess/evaluate cause of self-care deficits   - Assess range of motion  - Assess patient's mobility; develop plan if impaired  - Assess patient's need for assistive devices and provide as appropriate  - Encourage maximum independence but intervene and supervise when  necessary  - Involve family in performance of ADLs  - Assess for home care needs following discharge   - Consider OT consult to assist with ADL evaluation and planning for discharge  - Provide patient education as appropriate  Outcome: Progressing  Goal: Maintains/Returns to pre admission functional level  Description: INTERVENTIONS:  - Perform AM-PAC 6 Click Basic Mobility/ Daily Activity assessment daily.  - Set and communicate daily mobility goal to care team and patient/family/caregiver.   - Collaborate with rehabilitation services on mobility goals if consulted  - Perform Range of Motion 4 times a day.  - Reposition patient every 3 hours.  - Dangle patient 3 times a day  - Stand patient 3 times a day  - Ambulate patient 3 times a day  - Out of bed to chair 3 times a day   - Out of bed for meals 3 times a day  - Out of bed for toileting  - Record patient progress and toleration of activity level   Outcome: Progressing     Problem: Knowledge Deficit  Goal: Patient/family/caregiver demonstrates understanding of disease process, treatment plan, medications, and discharge instructions  Description: Complete learning assessment and assess knowledge base.  Interventions:  - Provide teaching at level of understanding  - Provide teaching via preferred learning methods  Outcome: Progressing     Problem: METABOLIC, FLUID AND ELECTROLYTES - ADULT  Goal: Electrolytes maintained within normal limits  Description: INTERVENTIONS:  - Monitor labs and assess patient for signs and symptoms of electrolyte imbalances  - Administer electrolyte replacement as ordered  - Monitor response to electrolyte replacements, including repeat lab results as appropriate  - Instruct patient on fluid and nutrition as appropriate  Outcome: Progressing     Problem: HEMATOLOGIC - ADULT  Goal: Maintains hematologic stability  Description: INTERVENTIONS  - Assess for signs and symptoms of bleeding or hemorrhage  - Monitor labs  - Administer supportive  blood products/factors as ordered and appropriate  Outcome: Progressing     Problem: CARDIOVASCULAR - ADULT  Goal: Maintains optimal cardiac output and hemodynamic stability  Description: INTERVENTIONS:  - Monitor I/O, vital signs and rhythm  - Monitor for S/S and trends of decreased cardiac output  - Administer and titrate ordered vasoactive medications to optimize hemodynamic stability  - Assess quality of pulses, skin color and temperature  - Assess for signs of decreased coronary artery perfusion  - Instruct patient to report change in severity of symptoms  Outcome: Progressing  Goal: Absence of cardiac dysrhythmias or at baseline rhythm  Description: INTERVENTIONS:  - Continuous cardiac monitoring, vital signs, obtain 12 lead EKG if ordered  - Administer antiarrhythmic and heart rate control medications as ordered  - Monitor electrolytes and administer replacement therapy as ordered  Outcome: Progressing

## 2025-05-26 NOTE — TREATMENT PLAN
Treatment plan    Plan of care discussed with spouse at bedside.  Questions answered at this time    Bean Sweeney,  FACP

## 2025-05-26 NOTE — ASSESSMENT & PLAN NOTE
Transferred here for vascular surgery intervention  Underwent right CFA SFA endarterectomy/stenting 5/13/2025  Aspirin discontinued.  Started on clopidogrel for stents.  Continue statin

## 2025-05-26 NOTE — ASSESSMENT & PLAN NOTE
Last known LVEF 50% echocardiogram 2024  Briefly volume overloaded due to blood transfusion  Will give additional dose of IV furosemide with blood transfusion today

## 2025-05-26 NOTE — ASSESSMENT & PLAN NOTE
Baseline hemoglobin appears to range 10-12  Transfused 1 unit PRBC but continues to have oozing from surgical foot site  Being transfused an additional unit PRBC today.  Will give furosemide posttransfusion    Results from last 7 days   Lab Units 05/26/25  0501 05/25/25  0540 05/24/25  0459   HEMOGLOBIN g/dL 6.9* 8.1* 8.5*   MCV fL 93 92 94

## 2025-05-26 NOTE — PROGRESS NOTES
Progress Note - Infectious Disease   Name: Gold Van 79 y.o. male I MRN: [id number]  Unit/Bed#: [place] [address] -01 I Date of Admission: 5/11/2025   Date of Service: 5/26/2025 I Hospital Day: 15    Assessment & Plan  Sepsis without acute organ dysfunction (HCC)  Admit on initial presentation to [facility] with tachycardia and leukocytosis, secondary to diabetic right foot infection.  Blood cultures negative.  As below patient is with ongoing surgical site infection.  He is afebrile and hemodynamically stable.  -Antibiotic plan as below  -Monitor temperature/WBC  -Supportive care per primary team  Ulcer of right foot with fat layer exposed secondary to osteomyelitis  Known chronic diabetic foot ulcer, MRI in April suggestive of osteomyelitis of fifth metatarsal head.  Admitted with acute wound cellulitis status post right partial fifth ray resection with assumed surgical cure of osteomyelitis.  There was yellow viscous fluid within the metatarsal phalangeal joint consistent with infection.  On dressing change 5/19 patient had ongoing green-colored drainage concerning for ongoing soft tissue infection.  Patient will need further right foot washout.  OR cx from 5/16 grew Serratia, Enterococcus faecalis and Pseudomonas.  Patient has no major contraindication to fluoroquinolone; Qtc 446, no aortic aneurysms on CT.  Per podiatry a TMA was recommended however patient wanted to save as much as possible of his foot.  Patient was taken back to the OR on 5/23 with further I&D and right partial fourth ray resection and further resection of fifth metatarsal stump.  Both wounds were noted to be hard, viable with bleeding at surgery.  No purulence were encountered.  With absence of residual osteomyelitis, patient does not need long-term IV antibiotic.  Podiatrist plan for replacing wound VAC noted.  - Continue IV Zosyn   - Treat x 7-day postop, through 5/30  - At discharge, transition to PO Amoxicillin 500 mg every 8 hours  (for Enterococcus) plus PO Levofloxacin 750 mg daily (for Pseudomonas and Serratia)  -Wound care/VAC per podiatry  Osteomyelitis ([facility])  Of fifth metatarsal head in setting of diabetic foot ulcer.  Now status post partial fifth ray resection with presumed surgical cure on 5/13.  Patient is status post repeat washout and resection of fifth metatarsal stump, with partial fourth ray resection, with residual bones hard, viable and with bleeding, all are signs of absence of residual osteomyelitis.  As in above, patient does not need long-term antibiotic.  - Antibiotic plan as above  - Wound care per podiatry  Severe peripheral arterial disease ([facility])  Status post vascular surgery intervention on 5/13 with right common femoral endarterectomy with bovine pericardial patch angioplasty, right SFA stenting, right angioplasty.  Type 2 diabetes mellitus with complication, without long-term current use of insulin ([facility])  Lab Results   Component Value Date    HGBA1C 9.3 (H) 04/09/2025   Significant risk factor for infection and poor wound healing.  -Tight glycemic control as per primary team  PAF (paroxysmal atrial fibrillation) ([facility])  On Eliquis at home.  Chronic heart failure with preserved ejection fraction (HFpEF) ([facility])  Cardiology following.    Discussed with patient in detail regarding the above plan.      Antibiotics:  Zosyn   POD # 3    Subjective   Patient feels better.  Pain in right foot is improved.  Temperature stays down.  No chills.  He is tolerating antibiotic well.  No nausea, vomiting or diarrhea.    Objective :  Temp:  [97.9 °F (36.6 °C)-98.4 °F (36.9 °C)] 98.2 °F (36.8 °C)  HR:  [61-86] 61  BP: ()/(51-63) 121/63  Resp:  [14-17] 14  SpO2:  [91 %-100 %] 100 %  O2 Device: None (Room air)    Physical Exam:     General: Awake, alert, cooperative, no distress.   Neck:  Supple. No mass.  No lymphadenopathy.   Lungs: Expansion symmetric, no rales, no wheezing, respirations unlabored.   Heart:  Regular rate and  rhythm, S1 and S2 normal, no murmur.   Abdomen: Soft, nondistended, non-tender, bowel sounds active all four quadrants, no masses, no organomegaly.   Extremities: Stable mild edema.  Right foot with dressing in place.  Dressing is dry.  No erythema/warmth beyond dressing.  Mild improvement.   Skin:  No rash.   Neuro: Moves all extremities.        Lab Results: I have reviewed the following results:  Results from last 7 days   Lab Units 05/26/25  0501 05/25/25  0540 05/24/25  0459   WBC Thousand/uL 8.48 9.56 10.47*   HEMOGLOBIN g/dL 6.9* 8.1* 8.5*   PLATELETS Thousands/uL 526* 541* 588*     Results from last 7 days   Lab Units 05/26/25  0501 05/25/25  0540 05/24/25  0459   SODIUM mmol/L 135 135 133*   POTASSIUM mmol/L 4.5 4.6 4.3   CHLORIDE mmol/L 105 105 103   CO2 mmol/L 25 25 24   BUN mg/dL 10 9 10   CREATININE mg/dL 0.91 0.91 0.88   EGFR ml/min/1.73sq m 79 79 81   CALCIUM mg/dL 8.4 8.3* 8.2*

## 2025-05-26 NOTE — ASSESSMENT & PLAN NOTE
Known chronic diabetic foot ulcer, MRI in April suggestive of osteomyelitis of fifth metatarsal head.  Admitted with acute wound cellulitis status post right partial fifth ray resection with assumed surgical cure of osteomyelitis.  There was yellow viscous fluid within the metatarsal phalangeal joint consistent with infection.  On dressing change 5/19 patient had ongoing green-colored drainage concerning for ongoing soft tissue infection.  Patient will need further right foot washout.  OR cx from 5/16 grew Serratia, Enterococcus faecalis and Pseudomonas.  Patient has no major contraindication to fluoroquinolone; Qtc 446, no aortic aneurysms on CT.  Per podiatry a TMA was recommended however patient wanted to save as much as possible of his foot.  Patient was taken back to the OR on 5/23 with further I&D and right partial fourth ray resection and further resection of fifth metatarsal stump.  Both wounds were noted to be hard, viable with bleeding at surgery.  No purulence were encountered.  With absence of residual osteomyelitis, patient does not need long-term IV antibiotic.  Podiatrist plan for replacing wound VAC noted.  - Continue IV Zosyn   - Treat x 7-day postop, through 5/30  - At discharge, transition to PO Amoxicillin 500 mg every 8 hours (for Enterococcus) plus PO Levofloxacin 750 mg daily (for Pseudomonas and Serratia)  -Wound care/VAC per podiatry

## 2025-05-26 NOTE — ASSESSMENT & PLAN NOTE
Admit on initial presentation to [facility] with tachycardia and leukocytosis, secondary to diabetic right foot infection.  Blood cultures negative.  As below patient is with ongoing surgical site infection.  He is afebrile and hemodynamically stable.  -Antibiotic plan as below  -Monitor temperature/WBC  -Supportive care per primary team

## 2025-05-26 NOTE — PROGRESS NOTES
Podiatry - Progress Note  Patient: Gold Van 79 y.o. male   MRN: [id number]  PCP: Shayne Diaz MD  Unit/Bed#: [address]-01 Encounter: [id number]  Date Of Visit: 05/26/25    ASSESSMENT:    Gold Van is a 79 y.o. male with:    Right 5th metatarsal ulceration with underlying OM (Imlls 4)  - s/p right fifth ray resection (DOS: 5/16/25)  - S/p right fourth ray resection DOS 5/20/2025  Right foot cellulitis  PAD  - s/p right femoral endarterectomy and antegrade endovascular intervention (DOS: 5/13/25)  Type 2 diabetes mellitus  - A1c: 9.3% (4/9/25)      PLAN:    Patient seen and evaluated at bedside.  He is POD 3 right partial fourth ray amputation with wound debridement.  Messaged by nursing this morning that patient had strikethrough on his right foot dressings after reinforcement.  Arrived at bedside, bandages soiled with blood.  Bandages and wound VAC unit removed.  Mild oozing noted from surgical site, no active bleeding.  Electrocautery used, two 3-0 Silk sutures thrown for hemostasis, see procedure note below. Oozing ceased.  1 piece of Surgifoam placed in wound bed.  Compressive dressing applied.  Will plan to reevaluate for possible wound VAC reapplication tomorrow  Reviewed labs and vitals.  Vital signs stable with pulse 85 and blood pressure 130/61.  Hemoglobin 6.9, internal medicine service planning blood transfusion 1 unit  Elevation and offloading on green foam wedges or pillows when non-ambulatory.  Rest of care per primary team.     Procedure note: Following patient verbal consent, electrocautery device used to right foot wound bed at area of oozing.  Two 3-0 silk sutures placed in wound bed.  Hemostasis achieved, oozing ceased.  1 piece of Surgifoam placed in wound bed.  Compressive dressing applied.  This was well-tolerated by patient and performed without incident    Weightbearing status: Non-weightbearing right foot    SUBJECTIVE:     The patient was seen, evaluated, and assessed at  bedside today. The patient was awake, alert, and in no acute distress. No acute events overnight. The patient reports he is doing well, denies any lightheadedness, chest pain or shortness of breath. Patient denies N/V/F/chills/SOB/CP.      OBJECTIVE:     Vitals:   /61   Pulse 85   Temp 98.2 °F (36.8 °C) (Oral)   Resp 17   Ht 6' (1.829 m)   Wt 82.9 kg (182 lb 12.2 oz)   SpO2 98%   BMI 24.79 kg/m²     Temp (24hrs), Av.2 °F (36.8 °C), Min:97.9 °F (36.6 °C), Max:98.4 °F (36.9 °C)      Physical Exam:     Lungs: Non labored breathing  Abdomen: Soft, non-tender.  Lower Extremity:  Cardiovascular status at baseline from admission.  Neurological status at baseline from admission.  Musculoskeletal status s/p R 4th and 5th ray amps. No calf tenderness noted.     Wound #: 1  Location: Right lateral foot  Length 9.5 cm: Width 3.5 cm: Depth 1.5 cm:   Deepest Tissue Noted in Base: Bone  Probe to Bone: Surgically exposed bone  Peripheral Skin Description: Attached  Granulation: 60% Fibrotic Tissue: 20% Necrotic Tissue: 20%   Drainage Amount: Moderate serosanguineous  Signs of Infection: No, no crepitus, fluctuance, purulence, erythema, malodor    Mild oozing from surgical site, stopped with electrocautery and sutures.  No active bleeding    Additional Data:     Labs:    Results from last 7 days   Lab Units 25  0501   WBC Thousand/uL 8.48   HEMOGLOBIN g/dL 6.9*   HEMATOCRIT % 22.1*   PLATELETS Thousands/uL 526*   SEGS PCT % 71   LYMPHO PCT % 18   MONO PCT % 8   EOS PCT % 1     Results from last 7 days   Lab Units 25  0501   POTASSIUM mmol/L 4.5   CHLORIDE mmol/L 105   CO2 mmol/L 25   BUN mg/dL 10   CREATININE mg/dL 0.91   CALCIUM mg/dL 8.4           * I Have Reviewed All Lab Data Listed Above.    Recent Cultures (last 7 days):               Imaging: I have personally reviewed pertinent films in PACS  EKG, Pathology, and Other Studies: I have personally reviewed pertinent reports.    ** Please Note:  "Portions of the record may have been created with voice recognition software. Occasional wrong word or \"sound a like\" substitutions may have occurred due to the inherent limitations of voice recognition software. Read the chart carefully and recognize, using context, where substitutions have occurred. **      "

## 2025-05-26 NOTE — PROGRESS NOTES
Progress Note - Hospitalist   Name: Gold Van 79 y.o. 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  Currently on pip-tazo.  Discharging with amoxicillin (for enterococcus) and levofloxacin (for serratia and pseudomonas) end date 5/30/2025.  7-day postoperative course  Severe peripheral arterial disease (HCC)  Transferred here for vascular surgery intervention  Underwent right CFA SFA endarterectomy/stenting 5/13/2025  Aspirin discontinued.  Started on clopidogrel for stents.  Continue statin  Osteomyelitis (HCC)  Right fifth metatarsal ulceration with underlying osteomyelitis and surrounding right foot cellulitis  5/16: Underwent right partial fifth ray resection  5/20 right partial fourth ray amputation, wound debridement  Intraoperative cultures with Serratia/Enterococcus, and Pseudomonas  Antibiotics as outlined above  Troponin I above reference range  Patient developed acute onset of dyspnea evening of 5/18 after blood transfusion  EKG with transient lateral ST depression, and elevated trops  Seen by cardiology.  Elevated troponin secondary to volume overload  Continue clopidogrel and statin  Chronic heart failure with preserved ejection fraction (HFpEF) ([facility])  Last known LVEF 50% echocardiogram 2024  Briefly volume overloaded due to blood transfusion  Will give additional dose of IV furosemide with blood transfusion  "today  Postoperative anemia  Baseline hemoglobin appears to range 10-12  Transfused 1 unit PRBC but continues to have oozing from surgical foot site  Being transfused an additional unit PRBC today.  Will give furosemide posttransfusion    Results from last 7 days   Lab Units 05/26/25  0501 05/25/25  0540 05/24/25  0459   HEMOGLOBIN g/dL 6.9* 8.1* 8.5*   MCV fL 93 92 94     Type 2 diabetes mellitus with complication, without long-term current use of insulin ([facility])  Lab Results   Component Value Date    HGBA1C 9.3 (H) 04/09/2025     Recent Labs     05/25/25  1603 05/25/25  2103 05/26/25  0741 05/26/25  1115   POCGLU 278* 260* 156* 231*     Prior to admission meds: glipizide, linagliptin and metformin  A1c 9.3.  Endocrinology consulted by vascular surgery  Close currently stable continue glipizide 10 mg twice daily metformin, and sliding scale  PAF (paroxysmal atrial fibrillation) ([facility])  Follows with [facility].  Continue atenolol  Anticoagulation: Continue apixaban  Essential hypertension  Continue atenolol 25 mg daily  Due to low blood pressures lisinopril decreased to 5 mg twice daily and holding amlodipine  Mild intermittent asthma without complication  No exacerbation; prior to admission on Breztri.  Continue equivalent  Pruritus  Chronic pruritus follows with dermatology as an outpatient on prednisone 5 mg daily  Lung nodule  Chest x-ray with incidental finding of \"nodular opacity at left base\"  Outpatient chest x-ray in 6 weeks    VTE Pharmacologic Prophylaxis: VTE Score: 5 High Risk (Score >/= 5) - Pharmacological DVT Prophylaxis Ordered: apixaban (Eliquis). Sequential Compression Devices Ordered.    Mobility:   Basic Mobility Inpatient Raw Score: 12  JH-HLM Goal: 4: Move to chair/commode  JH-HLM Achieved: 4: Move to chair/commode  JH-HLM Goal achieved. Continue to encourage appropriate mobility.    Patient Centered Rounds: I have performed bedside rounds with nursing staff today.  Discussions with Specialists or " Other Care Team Provider: Podiatry    Education and Discussions with Family / Patient:     Current Length of Stay: 15 day(s)  Current Patient Status: Inpatient   Certification Statement: The patient will continue to require additional inpatient hospital stay due to postoperative bleeding requiring blood transfusion  Discharge Plan: Anticipate discharge in 24-48 hrs to rehab facility.    Code Status: Level 1 - Full Code    Subjective   Patient seen and examined.  Bleeding from foot.  Otherwise no chest pain or shortness of breath    Objective   Vitals:   Temp (24hrs), Av.2 °F (36.8 °C), Min:97.9 °F (36.6 °C), Max:98.4 °F (36.9 °C)    Temp:  [97.9 °F (36.6 °C)-98.4 °F (36.9 °C)] 98.2 °F (36.8 °C)  HR:  [61-86] 61  Resp:  [14-17] 14  BP: ()/(51-63) 121/63  SpO2:  [91 %-100 %] 100 %  Body mass index is 24.79 kg/m².     Input and Output Summary (last 24 hours):     Intake/Output Summary (Last 24 hours) at 2025 1442  Last data filed at 2025 1313  Gross per 24 hour   Intake 919.75 ml   Output 500 ml   Net 419.75 ml       Physical Exam  Vitals reviewed.   Constitutional:       General: He is not in acute distress.  HENT:      Head: Atraumatic.     Cardiovascular:      Rate and Rhythm: Regular rhythm.   Pulmonary:      Effort: Pulmonary effort is normal.      Breath sounds: Decreased breath sounds present. No wheezing.   Abdominal:      General: Bowel sounds are normal.      Palpations: Abdomen is soft.      Tenderness: There is no abdominal tenderness.     Musculoskeletal:         General: Tenderness and deformity present.      Comments: Right foot     Skin:     General: Skin is warm and dry.     Neurological:      General: No focal deficit present.      Mental Status: He is alert.      Motor: No weakness.     Psychiatric:         Mood and Affect: Mood normal.       Lines/Drains:  Invasive Devices       Peripheral Intravenous Line  Duration             Peripheral IV 25 Left;Proximal;Ventral  (anterior) Forearm 4 days    Peripheral IV 05/26/25 Left;Ventral (anterior) Forearm <1 day                        Lab Results: I have reviewed the following results:   Results from last 7 days   Lab Units 05/26/25  0501 05/25/25  0540 05/24/25  0459   WBC Thousand/uL 8.48 9.56 10.47*   HEMOGLOBIN g/dL 6.9* 8.1* 8.5*   PLATELETS Thousands/uL 526* 541* 588*   MCV fL 93 92 94     Results from last 7 days   Lab Units 05/26/25  0501 05/25/25  0540 05/24/25  0459   SODIUM mmol/L 135 135 133*   POTASSIUM mmol/L 4.5 4.6 4.3   CHLORIDE mmol/L 105 105 103   CO2 mmol/L 25 25 24   ANION GAP mmol/L 5 5 6   BUN mg/dL 10 9 10   CREATININE mg/dL 0.91 0.91 0.88   CALCIUM mg/dL 8.4 8.3* 8.2*   EGFR ml/min/1.73sq m 79 79 81   GLUCOSE RANDOM mg/dL 170* 155* 227*     Results from last 7 days   Lab Units 05/23/25  0503 05/21/25  2018 05/21/25  0458   MAGNESIUM mg/dL 2.0 2.0 1.9         Results from last 7 days   Lab Units 05/23/25 2002 05/23/25  1828 05/23/25  1602   HS TNI 0HR ng/L  --   --  100*   HS TNI 2HR ng/L  --  91*  --    HS TNI 4HR ng/L 92*  --   --               Results from last 7 days   Lab Units 05/26/25  1115 05/26/25  0741 05/25/25  2103 05/25/25  1603 05/25/25  1103 05/25/25  0727 05/24/25  2117 05/24/25  1621 05/24/25  1145 05/24/25  0725 05/23/25 2052 05/23/25  1606   POC GLUCOSE mg/dl 231* 156* 260* 278* 359* 147* 286* 273* 293* 230* 209* 188*               Recent Cultures (last 7 days):         Imaging:  Reviewed radiology reports from this admission including:  XR foot 3+ vw right  Result Date: 5/16/2025  Impression: Postop right foot. No acute osseous abnormality. Workstation performed: [id number]     XR or angio leg rt  Result Date: 5/16/2025  Impression: Fluoroscopy provided for procedure guidance. Please refer to the separate procedure note for additional details. Workstation performed: [id number]       Last 24 Hours Medication List:     Current Facility-Administered Medications:     acetaminophen (TYLENOL)  tablet 975 mg, Q8H SANDRA    albuterol (PROVENTIL HFA,VENTOLIN HFA) inhaler 1 puff, Q4H PRN    [Held by provider] amLODIPine (NORVASC) tablet 5 mg, BID    apixaban (ELIQUIS) tablet 5 mg, BID    atenolol (TENORMIN) tablet 25 mg, Daily    atorvastatin (LIPITOR) tablet 40 mg, QPM    Budeson-Glycopyrrol-Formoterol 160-9-4.8 MCG/ACT AERO 2 puff, BID    clopidogrel (PLAVIX) tablet 75 mg, Daily    docusate sodium (COLACE) capsule 100 mg, BID    famotidine (PEPCID) tablet 20 mg, BID    furosemide (LASIX) injection 40 mg, Once    glipiZIDE (GLUCOTROL XL) 24 hr tablet 10 mg, BID AC    insulin lispro (HumALOG/ADMELOG) 100 units/mL subcutaneous injection 1-5 Units, TID AC **AND** Fingerstick Glucose (POCT), TID AC    insulin lispro (HumALOG/ADMELOG) 100 units/mL subcutaneous injection 1-5 Units, HS    levalbuterol (XOPENEX) inhalation solution 1.25 mg, Q6H PRN    lidocaine (LIDODERM) 5 % patch 1 patch, Daily    lisinopril (ZESTRIL) tablet 5 mg, BID    [Held by provider] metFORMIN (GLUCOPHAGE-XR) 24 hr tablet 500 mg, Daily With Dinner    metoprolol (LOPRESSOR) injection 5 mg, Q6H PRN    montelukast (SINGULAIR) tablet 10 mg, HS    morphine injection 2 mg, Q4H PRN    multivitamin-minerals (CENTRUM) tablet 1 tablet, Daily    ondansetron (ZOFRAN) injection 4 mg, Q6H PRN    oxyCODONE (ROXICODONE) IR tablet 5 mg, Q4H PRN **OR** oxyCODONE (ROXICODONE) immediate release tablet 10 mg, Q4H PRN    [COMPLETED] piperacillin-tazobactam (ZOSYN) 4.5 g in sodium chloride 0.9 % 100 mL IV LOADING DOSE, Once, Last Rate: Stopped (05/20/25 1245) **FOLLOWED BY** piperacillin-tazobactam (ZOSYN) 4.5 g in sodium chloride 0.9 % 100 mL IVPB (EXTENDED INFUSION), Q8H, Last Rate: 4.5 g (05/26/25 0611)    polyethylene glycol (MIRALAX) packet 17 g, Daily PRN    predniSONE tablet 5 mg, Daily    senna (SENOKOT) tablet 8.6 mg, Daily    Administrative Statements   Today, Patient Was Seen By: Bean Sweeney, DO  I have spent a total time of 35 minutes in caring for this  patient on the day of the visit/encounter including Documenting in the medical record, Reviewing/placing orders in the medical record (including tests, medications, and/or procedures), Obtaining or reviewing history  , and Communicating with other healthcare professionals .    **Please Note: This note may have been constructed using a voice recognition system.**

## 2025-05-26 NOTE — ASSESSMENT & PLAN NOTE
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  Currently on pip-tazo.  Discharging with amoxicillin (for enterococcus) and levofloxacin (for serratia and pseudomonas) end date 5/30/2025.  7-day postoperative course

## 2025-05-26 NOTE — ASSESSMENT & PLAN NOTE
Patient developed acute onset of dyspnea evening of 5/18 after blood transfusion  EKG with transient lateral ST depression, and elevated trops  Seen by cardiology.  Elevated troponin secondary to volume overload  Continue clopidogrel and statin

## 2025-05-27 LAB
ABO GROUP BLD BPU: NORMAL
ALBUMIN SERPL BCG-MCNC: 2.9 G/DL (ref 3.5–5)
ALP SERPL-CCNC: 51 U/L (ref 34–104)
ALT SERPL W P-5'-P-CCNC: 8 U/L (ref 7–52)
ANION GAP SERPL CALCULATED.3IONS-SCNC: 7 MMOL/L (ref 4–13)
AST SERPL W P-5'-P-CCNC: 12 U/L (ref 13–39)
BILIRUB SERPL-MCNC: 0.42 MG/DL (ref 0.2–1)
BPU ID: NORMAL
BUN SERPL-MCNC: 11 MG/DL (ref 5–25)
CALCIUM ALBUM COR SERPL-MCNC: 9.2 MG/DL (ref 8.3–10.1)
CALCIUM SERPL-MCNC: 8.3 MG/DL (ref 8.4–10.2)
CHLORIDE SERPL-SCNC: 104 MMOL/L (ref 96–108)
CO2 SERPL-SCNC: 25 MMOL/L (ref 21–32)
CREAT SERPL-MCNC: 0.97 MG/DL (ref 0.6–1.3)
CROSSMATCH: NORMAL
ERYTHROCYTE [DISTWIDTH] IN BLOOD BY AUTOMATED COUNT: 16.5 % (ref 11.6–15.1)
GFR SERPL CREATININE-BSD FRML MDRD: 73 ML/MIN/1.73SQ M
GLUCOSE SERPL-MCNC: 132 MG/DL (ref 65–140)
GLUCOSE SERPL-MCNC: 143 MG/DL (ref 65–140)
GLUCOSE SERPL-MCNC: 184 MG/DL (ref 65–140)
GLUCOSE SERPL-MCNC: 209 MG/DL (ref 65–140)
GLUCOSE SERPL-MCNC: 281 MG/DL (ref 65–140)
HCT VFR BLD AUTO: 25 % (ref 36.5–49.3)
HGB BLD-MCNC: 8 G/DL (ref 12–17)
MCH RBC QN AUTO: 29.7 PG (ref 26.8–34.3)
MCHC RBC AUTO-ENTMCNC: 32 G/DL (ref 31.4–37.4)
MCV RBC AUTO: 93 FL (ref 82–98)
PLATELET # BLD AUTO: 496 THOUSANDS/UL (ref 149–390)
PMV BLD AUTO: 9 FL (ref 8.9–12.7)
POTASSIUM SERPL-SCNC: 4 MMOL/L (ref 3.5–5.3)
PROT SERPL-MCNC: 6 G/DL (ref 6.4–8.4)
RBC # BLD AUTO: 2.69 MILLION/UL (ref 3.88–5.62)
SODIUM SERPL-SCNC: 136 MMOL/L (ref 135–147)
UNIT DISPENSE STATUS: NORMAL
UNIT PRODUCT CODE: NORMAL
UNIT PRODUCT VOLUME: 250 ML
UNIT RH: NORMAL
WBC # BLD AUTO: 7.93 THOUSAND/UL (ref 4.31–10.16)

## 2025-05-27 PROCEDURE — 99232 SBSQ HOSP IP/OBS MODERATE 35: CPT | Performed by: INTERNAL MEDICINE

## 2025-05-27 PROCEDURE — 80053 COMPREHEN METABOLIC PANEL: CPT | Performed by: INTERNAL MEDICINE

## 2025-05-27 PROCEDURE — NC001 PR NO CHARGE: Performed by: PODIATRIST

## 2025-05-27 PROCEDURE — G0545 PR INHERENT VISIT TO INPT: HCPCS | Performed by: INTERNAL MEDICINE

## 2025-05-27 PROCEDURE — 85027 COMPLETE CBC AUTOMATED: CPT | Performed by: INTERNAL MEDICINE

## 2025-05-27 PROCEDURE — 82948 REAGENT STRIP/BLOOD GLUCOSE: CPT

## 2025-05-27 RX ORDER — LISINOPRIL 5 MG/1
5 TABLET ORAL DAILY
Status: DISCONTINUED | OUTPATIENT
Start: 2025-05-28 | End: 2025-05-28 | Stop reason: HOSPADM

## 2025-05-27 RX ADMIN — GLIPIZIDE 10 MG: 5 TABLET, FILM COATED, EXTENDED RELEASE ORAL at 17:07

## 2025-05-27 RX ADMIN — BUDESONIDE, GLYCOPYRROLATE, AND FORMOTEROL FUMARATE 2 PUFF: 160; 9; 4.8 AEROSOL, METERED RESPIRATORY (INHALATION) at 08:49

## 2025-05-27 RX ADMIN — INSULIN LISPRO 1 UNITS: 100 INJECTION, SOLUTION INTRAVENOUS; SUBCUTANEOUS at 12:32

## 2025-05-27 RX ADMIN — APIXABAN 5 MG: 5 TABLET, FILM COATED ORAL at 08:46

## 2025-05-27 RX ADMIN — ACETAMINOPHEN 650 MG: 325 TABLET, FILM COATED ORAL at 21:43

## 2025-05-27 RX ADMIN — OXYCODONE HYDROCHLORIDE 10 MG: 10 TABLET ORAL at 20:34

## 2025-05-27 RX ADMIN — OXYCODONE HYDROCHLORIDE 10 MG: 10 TABLET ORAL at 14:16

## 2025-05-27 RX ADMIN — INSULIN LISPRO 3 UNITS: 100 INJECTION, SOLUTION INTRAVENOUS; SUBCUTANEOUS at 17:07

## 2025-05-27 RX ADMIN — BUDESONIDE, GLYCOPYRROLATE, AND FORMOTEROL FUMARATE 2 PUFF: 160; 9; 4.8 AEROSOL, METERED RESPIRATORY (INHALATION) at 21:43

## 2025-05-27 RX ADMIN — OXYCODONE HYDROCHLORIDE 10 MG: 10 TABLET ORAL at 08:49

## 2025-05-27 RX ADMIN — INSULIN LISPRO 1 UNITS: 100 INJECTION, SOLUTION INTRAVENOUS; SUBCUTANEOUS at 21:44

## 2025-05-27 RX ADMIN — PIPERACILLIN AND TAZOBACTAM 4.5 G: 36; 4.5 INJECTION, POWDER, LYOPHILIZED, FOR SOLUTION INTRAVENOUS at 00:37

## 2025-05-27 RX ADMIN — Medication 1 TABLET: at 08:46

## 2025-05-27 RX ADMIN — FAMOTIDINE 20 MG: 20 TABLET, FILM COATED ORAL at 17:07

## 2025-05-27 RX ADMIN — PIPERACILLIN AND TAZOBACTAM 4.5 G: 36; 4.5 INJECTION, POWDER, LYOPHILIZED, FOR SOLUTION INTRAVENOUS at 17:08

## 2025-05-27 RX ADMIN — PREDNISONE 5 MG: 5 TABLET ORAL at 08:46

## 2025-05-27 RX ADMIN — ACETAMINOPHEN 650 MG: 325 TABLET, FILM COATED ORAL at 14:16

## 2025-05-27 RX ADMIN — ACETAMINOPHEN 650 MG: 325 TABLET, FILM COATED ORAL at 06:34

## 2025-05-27 RX ADMIN — PIPERACILLIN AND TAZOBACTAM 4.5 G: 36; 4.5 INJECTION, POWDER, LYOPHILIZED, FOR SOLUTION INTRAVENOUS at 08:48

## 2025-05-27 RX ADMIN — MORPHINE SULFATE 2 MG: 2 INJECTION, SOLUTION INTRAMUSCULAR; INTRAVENOUS at 10:11

## 2025-05-27 RX ADMIN — GLIPIZIDE 10 MG: 5 TABLET, FILM COATED, EXTENDED RELEASE ORAL at 06:33

## 2025-05-27 RX ADMIN — LISINOPRIL 5 MG: 5 TABLET ORAL at 08:47

## 2025-05-27 RX ADMIN — ATORVASTATIN CALCIUM 40 MG: 40 TABLET, FILM COATED ORAL at 17:07

## 2025-05-27 RX ADMIN — DOCUSATE SODIUM 100 MG: 100 CAPSULE, LIQUID FILLED ORAL at 17:07

## 2025-05-27 RX ADMIN — APIXABAN 5 MG: 5 TABLET, FILM COATED ORAL at 17:07

## 2025-05-27 RX ADMIN — FAMOTIDINE 20 MG: 20 TABLET, FILM COATED ORAL at 08:46

## 2025-05-27 RX ADMIN — MONTELUKAST 10 MG: 10 TABLET, FILM COATED ORAL at 21:43

## 2025-05-27 RX ADMIN — ATENOLOL 25 MG: 50 TABLET ORAL at 08:47

## 2025-05-27 RX ADMIN — CLOPIDOGREL BISULFATE 75 MG: 75 TABLET, FILM COATED ORAL at 08:47

## 2025-05-27 NOTE — DISCHARGE SUPPORT
Per CM, accepting SNF needs to be updated. Approval updated. Info below.     Facility Authorization Approved  [facility] received approved auth for: SNF  Insurance: [facility]  Facility Name: [facility] NPI: [id number]   Facility MD: Gregoria Medina NPI: [id number]   Approved Authorization Number: [id number]  Start of Care Date: 05/27/25  Next Review Date: 05/29/25 (2 Business Days)  Submit Next Review to: TRAVIS#: [phone] / [employer] Portal     notified: Edith Soto

## 2025-05-27 NOTE — PLAN OF CARE
Problem: PAIN - ADULT  Goal: Verbalizes/displays adequate comfort level or baseline comfort level  Description: Interventions:- Encourage patient to monitor pain and request assistance- Assess pain using appropriate pain scale- Administer analgesics based on type and severity of pain and evaluate response- Implement non-pharmacological measures as appropriate and evaluate response- Consider cultural and social influences on pain and pain management- Notify physician/advanced practitioner if interventions unsuccessful or patient reports new pain  Outcome: Progressing     Problem: DISCHARGE PLANNING  Goal: Discharge to home or other facility with appropriate resources  Description: INTERVENTIONS:- Identify barriers to discharge w/patient and caregiver- Arrange for needed discharge resources and transportation as appropriate- Identify discharge learning needs (meds, wound care, etc.)- Arrange for interpretive services to assist at discharge as needed- Refer to Case Management Department for coordinating discharge planning if the patient needs post-hospital services based on physician/advanced practitioner order or complex needs related to functional status, cognitive ability, or social support system  Outcome: Progressing     Problem: SKIN/TISSUE INTEGRITY - ADULT  Goal: Skin Integrity remains intact(Skin Breakdown Prevention)  Description: Assess:-Perform Sae assessment -Clean and moisturize skin -Inspect skin when repositioning, toileting, and assisting with ADLS-Assess under medical devices -Assess extremities for adequate circulation and sensation Bed Management:-Have minimal linens on bed & keep smooth, unwrinkled-Change linens as needed when moist or perspiring-Avoid sitting or lying in one position for more than 2 hours while in bed-Keep HOB at 30 degrees Toileting:-Offer bedside commode-Assess for incontinence -Use incontinent care products after each incontinent episode Activity:-Mobilize patient 3 times  a day-Encourage activity and walks on unit-Encourage or provide ROM exercises -Turn and reposition patient every 2 Hours-Use appropriate equipment to lift or move patient in bed-Instruct/ Assist with weight shifting  when out of bed in chair-Consider limitation of chair time 2 hour intervalsSkin Care:-Avoid use of baby powder, tape, friction and shearing, hot water or constrictive clothing-Relieve pressure over bony prominences Do not massage red bony areasNext Steps:-Teach patient strategies to minimize risks Consider consults to  interdisciplinary teams   Outcome: Progressing  Goal: Incision(s), wounds(s) or drain site(s) healing without S/S of infection  Description: INTERVENTIONS- Assess and document dressing, incision, wound bed, drain sites and surrounding tissue- Provide patient and family education- Perform skin care/dressing changes every shift  Outcome: Progressing  Goal: Pressure injury heals and does not worsen  Description: Interventions:- Implement low air loss mattress or specialty surface (Criteria met)- Apply silicone foam dressing- Instruct/assist with weight shifting every 120 minutes when in chair - Limit chair time to 2 hour intervals- Use special pressure reducing interventions such as turning when in chair - Apply fecal or urinary incontinence containment device - Perform passive or active ROM every 2 hours- Turn and reposition patient & offload bony prominences every 2 hours - Utilize friction reducing device or surface for transfers - Consider consults to  interdisciplinary teams such as wound- Use incontinent care products after each incontinent episode such as wipes- Consider nutrition services referral as needed  Outcome: Progressing     Problem: Nutrition/Hydration-ADULT  Goal: Nutrient/Hydration intake appropriate for improving, restoring or maintaining nutritional needs  Description: Monitor and assess patient's nutrition/hydration status for malnutrition. Collaborate with  interdisciplinary team and initiate plan and interventions as ordered.  Monitor patient's weight and dietary intake as ordered or per policy. Utilize nutrition screening tool and intervene as necessary. Determine patient's food preferences and provide high-protein, high-caloric foods as appropriate. INTERVENTIONS:- Monitor oral intake, urinary output, labs, and treatment plans- Assess nutrition and hydration status and recommend course of action- Evaluate amount of meals eaten- Assist patient with eating if necessary - Allow adequate time for meals- Recommend/ encourage appropriate diets, oral nutritional supplements, and vitamin/mineral supplements- Order, calculate, and assess calorie counts as needed- Recommend, monitor, and adjust tube feedings and TPN/PPN based on assessed needs- Assess need for intravenous fluids- Provide specific nutrition/hydration education as appropriate- Include patient/family/caregiver in decisions related to nutrition  Outcome: Progressing     Problem: Prexisting or High Potential for Compromised Skin Integrity  Goal: Skin integrity is maintained or improved  Description: INTERVENTIONS:  - Identify patients at risk for skin breakdown  - Assess and monitor skin integrity  - Assess and monitor nutrition and hydration status  - Monitor labs   - Assess for incontinence   - Turn and reposition patient  - Assist with mobility/ambulation  - Relieve pressure over bony prominences  - Avoid friction and shearing  - Provide appropriate hygiene as needed including keeping skin clean and dry  - Evaluate need for skin moisturizer/barrier cream  - Collaborate with interdisciplinary team   - Patient/family teaching  - Consider wound care consult   Outcome: Progressing     Problem: INFECTION - ADULT  Goal: Absence or prevention of progression during hospitalization  Description: INTERVENTIONS:  - Assess and monitor for signs and symptoms of infection  - Monitor lab/diagnostic results  - Monitor all  insertion sites, i.e. indwelling lines, tubes, and drains  - Monitor endotracheal if appropriate and nasal secretions for changes in amount and color  - [place] appropriate cooling/warming therapies per order  - Administer medications as ordered  - Instruct and encourage patient and family to use good hand hygiene technique  - Identify and instruct in appropriate isolation precautions for identified infection/condition  Outcome: Progressing  Goal: Absence of fever/infection during neutropenic period  Description: INTERVENTIONS:  - Monitor WBC    Outcome: Progressing     Problem: SAFETY ADULT  Goal: Patient will remain free of falls  Description: INTERVENTIONS:  - Educate patient/family on patient safety including physical limitations  - Instruct patient to call for assistance with activity   - Consult OT/PT to assist with strengthening/mobility   - Keep Call bell within reach  - Keep bed low and locked with side rails adjusted as appropriate  - Keep care items and personal belongings within reach  - Initiate and maintain comfort rounds  - Make Fall Risk Sign visible to staff  - Offer Toileting every 2 Hours, in advance of need  - Initiate/Maintain bed alarm  - Obtain necessary fall risk management equipment:   - Apply yellow socks and bracelet for high fall risk patients  - Consider moving patient to room near nurses station  Outcome: Progressing  Goal: Maintain or return to baseline ADL function  Description: INTERVENTIONS:  -  Assess patient's ability to carry out ADLs; assess patient's baseline for ADL function and identify physical deficits which impact ability to perform ADLs (bathing, care of mouth/teeth, toileting, grooming, dressing, etc.)  - Assess/evaluate cause of self-care deficits   - Assess range of motion  - Assess patient's mobility; develop plan if impaired  - Assess patient's need for assistive devices and provide as appropriate  - Encourage maximum independence but intervene and supervise when  necessary  - Involve family in performance of ADLs  - Assess for home care needs following discharge   - Consider OT consult to assist with ADL evaluation and planning for discharge  - Provide patient education as appropriate  Outcome: Progressing  Goal: Maintains/Returns to pre admission functional level  Description: INTERVENTIONS:  - Perform AM-PAC 6 Click Basic Mobility/ Daily Activity assessment daily.  - Set and communicate daily mobility goal to care team and patient/family/caregiver.   - Collaborate with rehabilitation services on mobility goals if consulted  - Perform Range of Motion 4 times a day.  - Reposition patient every 3 hours.  - Dangle patient 3 times a day  - Stand patient 3 times a day  - Ambulate patient 3 times a day  - Out of bed to chair 3 times a day   - Out of bed for meals 3 times a day  - Out of bed for toileting  - Record patient progress and toleration of activity level   Outcome: Progressing     Problem: Knowledge Deficit  Goal: Patient/family/caregiver demonstrates understanding of disease process, treatment plan, medications, and discharge instructions  Description: Complete learning assessment and assess knowledge base.  Interventions:  - Provide teaching at level of understanding  - Provide teaching via preferred learning methods  Outcome: Progressing     Problem: METABOLIC, FLUID AND ELECTROLYTES - ADULT  Goal: Electrolytes maintained within normal limits  Description: INTERVENTIONS:  - Monitor labs and assess patient for signs and symptoms of electrolyte imbalances  - Administer electrolyte replacement as ordered  - Monitor response to electrolyte replacements, including repeat lab results as appropriate  - Instruct patient on fluid and nutrition as appropriate  Outcome: Progressing     Problem: HEMATOLOGIC - ADULT  Goal: Maintains hematologic stability  Description: INTERVENTIONS  - Assess for signs and symptoms of bleeding or hemorrhage  - Monitor labs  - Administer supportive  blood products/factors as ordered and appropriate  Outcome: Progressing     Problem: CARDIOVASCULAR - ADULT  Goal: Maintains optimal cardiac output and hemodynamic stability  Description: INTERVENTIONS:  - Monitor I/O, vital signs and rhythm  - Monitor for S/S and trends of decreased cardiac output  - Administer and titrate ordered vasoactive medications to optimize hemodynamic stability  - Assess quality of pulses, skin color and temperature  - Assess for signs of decreased coronary artery perfusion  - Instruct patient to report change in severity of symptoms  Outcome: Progressing  Goal: Absence of cardiac dysrhythmias or at baseline rhythm  Description: INTERVENTIONS:  - Continuous cardiac monitoring, vital signs, obtain 12 lead EKG if ordered  - Administer antiarrhythmic and heart rate control medications as ordered  - Monitor electrolytes and administer replacement therapy as ordered  Outcome: Progressing     Problem: MOBILITY - ADULT  Goal: Maintain or return to baseline ADL function  Description: INTERVENTIONS:  -  Assess patient's ability to carry out ADLs; assess patient's baseline for ADL function and identify physical deficits which impact ability to perform ADLs (bathing, care of mouth/teeth, toileting, grooming, dressing, etc.)  - Assess/evaluate cause of self-care deficits   - Assess range of motion  - Assess patient's mobility; develop plan if impaired  - Assess patient's need for assistive devices and provide as appropriate  - Encourage maximum independence but intervene and supervise when necessary  - Involve family in performance of ADLs  - Assess for home care needs following discharge   - Consider OT consult to assist with ADL evaluation and planning for discharge  - Provide patient education as appropriate  Outcome: Progressing  Goal: Maintains/Returns to pre admission functional level  Description: INTERVENTIONS:  - Perform AM-PAC 6 Click Basic Mobility/ Daily Activity assessment daily.  - Set  and communicate daily mobility goal to care team and patient/family/caregiver.   - Collaborate with rehabilitation services on mobility goals if consulted  - Perform Range of Motion 4 times a day.  - Reposition patient every 3 hours.  - Dangle patient 3 times a day  - Stand patient 3 times a day  - Ambulate patient 3 times a day  - Out of bed to chair 3 times a day   - Out of bed for meals 3 times a day  - Out of bed for toileting  - Record patient progress and toleration of activity level   Outcome: Progressing

## 2025-05-27 NOTE — CASE MANAGEMENT
Case Management Discharge Planning Note    Patient name Gold Van  Location [place] [address] /[facility] M* MRN [id number]  : 1945 Date 2025       Current Admission Date: 2025  Current Admission Diagnosis:Sepsis without acute organ dysfunction ([facility])   Patient Active Problem List    Diagnosis Date Noted    Osteomyelitis ([facility]) 2025    Troponin I above reference range 2025    Volume overload 2025    Postoperative anemia 2025    Pulmonary hypertension ([facility]) 2025    Sepsis without acute organ dysfunction ([facility]) 2025    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 ([facility]) 2025    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 ([facility]) 2025    PAF (paroxysmal atrial fibrillation) ([facility]) 2025    Chronic heart failure with preserved ejection fraction (HFpEF) ([facility]) 2025    Ulcer of right foot with fat layer exposed secondary to osteomyelitis 2025    Severe peripheral arterial disease ([facility]) 2024    Moderate protein-calorie malnutrition ([facility]) 10/09/2024    Gastroesophageal reflux disease without esophagitis 10/04/2024    Impaired mobility and activities of daily living 10/04/2024    Neuropathy 10/04/2024    Foot drop, left 10/04/2024    Abnormal echocardiogram 10/03/2024    CVA (cerebrovascular accident) ([facility]) 10/02/2024    Dysmetria 10/01/2024    COVID-19 2024    Acute respiratory insufficiency 2024    Shortness of breath 2024    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    History of pneumothorax 2024    Acute respiratory failure with hypoxia ([facility]) 2024    Non-recurrent bilateral inguinal hernia without obstruction or gangrene 2024    Pruritus 2024    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 2021    Hyponatremia 2021    Lung nodule 2021    Type 2 diabetes mellitus with complication, without long-term current use of insulin  ([facility]) 10/23/2017    Essential hypertension 10/23/2017    Mild intermittent asthma without complication 10/23/2017    Mixed hyperlipidemia 10/23/2017      LOS (days): 16  Geometric Mean LOS (GMLOS) (days): 9.6  Days to GMLOS:-6.1     OBJECTIVE:  Risk of Unplanned Readmission Score: 35.55         Current admission status: Inpatient   Preferred Pharmacy:   [facility] - [place], [place] - [address]  [address] [facility] [address]  Phone: [phone] Fax: [phone]    [facility] - [place], [place] - [address]  [address]  [facility] [address]  Phone: [phone] Fax: [phone]    [id number] [place] [facility] [address] [address]  Phone: [phone] Fax: [phone]    Primary Care Provider: Shayne Diaz MD    Primary Insurance: [id number]  REP  Secondary Insurance:     DISCHARGE DETAILS:    Discharge planning discussed with:: pt's wife  Freedom of Choice: Yes  Comments - Freedom of Choice: Final decision has been made after visiting sites and would like [facility] for STR- reserved in [place]  CM contacted family/caregiver?: Yes  Were Treatment Team discharge recommendations reviewed with patient/caregiver?: Yes  Did patient/caregiver verbalize understanding of patient care needs?: Yes       Contacts  Patient Contacts: Mya Van  Relationship to Patient:: Family  Contact Method: Phone  Phone Number: [phone]  Reason/Outcome: Discharge Planning                        Treatment Team Recommendation: Short Term Rehab  Discharge Destination Plan:: Short Term Rehab, SNF ([facility])  Transport at Discharge : [employer]     Number/Name of Dispatcher: Roundtrip  Transported by (Company and Unit #): [employer]  ETA of Transport (Date): 05/28/25  ETA of Transport (Time): 1230                            Accepting Facility Name, City & State : [facility]  Receiving  Facility/Agency Phone Number: [phone]  Facility/Agency Fax Number: [phone]

## 2025-05-27 NOTE — ASSESSMENT & PLAN NOTE
Patient developed acute onset of dyspnea evening of 5/18 after blood transfusion  EKG with transient lateral ST depression, and elevated trops  Seen by cardiology.  Elevated troponin secondary to volume overload  Continue clopidogrel and statin    Lab Results   Component Value Date    HSTNI0 100 (H) 05/23/2025    HSTNI2 91 (H) 05/23/2025    HSTNI4 92 (H) 05/23/2025    HSTNI 159 (H) 05/21/2025

## 2025-05-27 NOTE — ASSESSMENT & PLAN NOTE
Continue atenolol 25 mg daily  Due to low blood pressures lisinopril decreased to 5 mg daily and holding amlodipine

## 2025-05-27 NOTE — ASSESSMENT & PLAN NOTE
Lab Results   Component Value Date    HGBA1C 9.3 (H) 04/09/2025     Recent Labs     05/26/25  1618 05/26/25 2015 05/27/25  0612 05/27/25  1115   POCGLU 293* 285* 132 209*     Prior to admission meds: glipizide, linagliptin and metformin  A1c 9.3.  Endocrinology consulted by vascular surgery  Glucose currently stable continue glipizide 10 mg twice daily and sliding scale

## 2025-05-27 NOTE — DISCHARGE SUPPORT
Case Management Assessment & Discharge Planning Note    Patient name Gold Van  Location [place] [address] /[place] [facility] M* MRN [id number]  : 1945 Date 2025       Current Admission Date: 2025  Current Admission Diagnosis:Sepsis without acute organ dysfunction ([facility])   Patient Active Problem List    Diagnosis Date Noted    Osteomyelitis ([facility]) 2025    Troponin I above reference range 2025    Volume overload 2025    Postoperative anemia 2025    Pulmonary hypertension ([facility]) 2025    Sepsis without acute organ dysfunction ([facility]) 2025    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 ([facility]) 2025    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 ([facility]) 2025    PAF (paroxysmal atrial fibrillation) ([facility]) 2025    Chronic heart failure with preserved ejection fraction (HFpEF) ([facility]) 2025    Ulcer of right foot with fat layer exposed secondary to osteomyelitis 2025    Severe peripheral arterial disease ([facility]) 2024    Moderate protein-calorie malnutrition ([facility]) 10/09/2024    Gastroesophageal reflux disease without esophagitis 10/04/2024    Impaired mobility and activities of daily living 10/04/2024    Neuropathy 10/04/2024    Foot drop, left 10/04/2024    Abnormal echocardiogram 10/03/2024    CVA (cerebrovascular accident) ([facility]) 10/02/2024    Dysmetria 10/01/2024    COVID-19 2024    Acute respiratory insufficiency 2024    Shortness of breath 2024    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[facility]) 2024    History of pneumothorax 2024    Acute respiratory failure with hypoxia ([facility]) 2024    Non-recurrent bilateral inguinal hernia without obstruction or gangrene 2024    Pruritus 2024    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 2021    Hyponatremia 2021    Lung nodule 2021    Type 2 diabetes mellitus with complication, without long-term current  use of insulin ([facility]) 10/23/2017    Essential hypertension 10/23/2017    Mild intermittent asthma without complication 10/23/2017    Mixed hyperlipidemia 10/23/2017      LOS (days): 16  Geometric Mean LOS (GMLOS) (days): 9.6  Days to GMLOS:-5.9   Facility Authorization Approved  [facility] received approved auth for: SNF  Insurance: [employer]  Authorization Obtained Via: Phone  Name/Phone # of Rep who called in determination: Nichol WADDELL#: [phone]  Facility Name: [facility]  Approved Authorization Number: [id number]  Start of Care Date: 05/27/25  Next Review Date: 05/29/25 (2 Business Days)  Submit Next Review to: F#: [phone] / [employer] Portal   notified: Edith Soto     Updates to authorization status will be noted in chart.    Please reach out to CM for updates on any clinical information.

## 2025-05-27 NOTE — PROGRESS NOTES
Progress Note - Podiatry   Name: Gold Van 79 y.o. male I MRN: [id number]  Unit/Bed#: [place] [address] -01 I Date of Admission: 5/11/2025   Date of Service: 5/27/2025 I Hospital Day: 16   { ?Quick Links I Problem List I PORCH I Billing Tip:39116}  Assessment & Plan  Sepsis without acute organ dysfunction (HCC)    Type 2 diabetes mellitus with complication, without long-term current use of insulin ([facility]CC)  Lab Results   Component Value Date    HGBA1C 9.3 (H) 04/09/2025       Recent Labs     05/26/25  1115 05/26/25  1618 05/26/25 2015 05/27/25  0612   POCGLU 231* 293* 285* 132       Blood Sugar Average: Last 72 hrs:  (P) [id number]    Severe peripheral arterial disease (HCC)    Ulcer of right foot with fat layer exposed secondary to osteomyelitis    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 ([facility]CC)    Postoperative anemia    Osteomyelitis ([facility])    Troponin I above reference range    Volume overload      {SL IP Progress note specialty templates:79114}

## 2025-05-27 NOTE — PROGRESS NOTES
Progress Note - Infectious Disease   Name: Gold Van 79 y.o. male I MRN: [id number]  Unit/Bed#: [place] [address] -01 I Date of Admission: 5/11/2025   Date of Service: 5/27/2025 I Hospital Day: 16    Assessment & Plan  Sepsis without acute organ dysfunction (HCC)  Admit on initial presentation to [facility] with tachycardia and leukocytosis, secondary to diabetic right foot infection.  Blood cultures negative.  As below patient is with ongoing surgical site infection.  He is afebrile and hemodynamically stable.  -Antibiotic plan as below  -Monitor temperature/WBC  -Supportive care per primary team  Ulcer of right foot with fat layer exposed secondary to osteomyelitis  Known chronic diabetic foot ulcer, MRI in April suggestive of osteomyelitis of fifth metatarsal head.  Admitted with acute wound cellulitis status post right partial fifth ray resection with assumed surgical cure of osteomyelitis.  There was yellow viscous fluid within the metatarsal phalangeal joint consistent with infection.  On dressing change 5/19 patient had ongoing green-colored drainage concerning for ongoing soft tissue infection.  Patient will need further right foot washout.  OR cx from 5/16 grew Serratia, Enterococcus faecalis and Pseudomonas.  Patient has no major contraindication to fluoroquinolone; Qtc 446, no aortic aneurysms on CT.  Per podiatry a TMA was recommended however patient wanted to save as much as possible of his foot.  Patient was taken back to the OR on 5/23 with further I&D and right partial fourth ray resection and further resection of fifth metatarsal stump.  Both wounds were noted to be hard, viable with bleeding at surgery.  No purulence were encountered.  With absence of residual osteomyelitis, patient does not need long-term IV antibiotic.  Podiatrist plan for replacing wound VAC noted.  - Continue IV Zosyn   - Treat x 7-day postop, through 5/30  - At discharge, transition to PO Amoxicillin 500 mg every 8 hours  (for Enterococcus) plus PO Levofloxacin 750 mg daily (for Pseudomonas and Serratia)  -Wound care/VAC per podiatry  Osteomyelitis ([facility])  Of fifth metatarsal head in setting of diabetic foot ulcer.  Now status post partial fifth ray resection with presumed surgical cure on 5/13.  Patient is status post repeat washout and resection of fifth metatarsal stump, with partial fourth ray resection, with residual bones hard, viable and with bleeding, all are signs of absence of residual osteomyelitis.  As in above, patient does not need long-term antibiotic.  - Antibiotic plan as above  - Wound care per podiatry  Severe peripheral arterial disease ([facility])  Status post vascular surgery intervention on 5/13 with right common femoral endarterectomy with bovine pericardial patch angioplasty, right SFA stenting, right angioplasty.  Type 2 diabetes mellitus with complication, without long-term current use of insulin ([facility])  Lab Results   Component Value Date    HGBA1C 9.3 (H) 04/09/2025   Significant risk factor for infection and poor wound healing.  -Tight glycemic control as per primary team  PAF (paroxysmal atrial fibrillation) ([facility])  On Eliquis at home.  Chronic heart failure with preserved ejection fraction (HFpEF) ([facility])  Cardiology following.    Discussed with patient in detail regarding the above plan.  I have discussed with Dr. Sweeney from primary service regarding the above plan to continue antibiotic.  He agrees with the plan.  Okay for discharge from ID viewpoint.    Antibiotics:  Zosyn   POD # 4    Subjective   Patient feels well.  Minimal pain in right foot.  Temperature stays down.  No chills.  He is tolerating antibiotic well.  No nausea, vomiting or diarrhea.    Objective :  Temp:  [97.7 °F (36.5 °C)-98.8 °F (37.1 °C)] 97.7 °F (36.5 °C)  HR:  [61-89] 89  BP: ()/(50-66) 118/66  Resp:  [14-18] 16  SpO2:  [96 %-100 %] 97 %  O2 Device: None (Room air)    Physical Exam:     General: Awake, alert, cooperative, no  distress.   Neck:  Supple. No mass.  No lymphadenopathy.   Lungs: Expansion symmetric, no rales, no wheezing, respirations unlabored.   Heart:  Regular rate and rhythm, S1 and S2 normal, no murmur.   Abdomen: Soft, nondistended, non-tender, bowel sounds active all four quadrants, no masses, no organomegaly.   Extremities: No edema.  Right foot wound with VAC.  No purulence.  No erythema/warmth.  Minimal tenderness.  No evidence of cellulitis at VAC placement by podiatry earlier today.   Skin:  No rash.   Neuro: Moves all extremities.        Lab Results: I have reviewed the following results:  Results from last 7 days   Lab Units 05/27/25  0421 05/26/25  0501 05/25/25  0540   WBC Thousand/uL 7.93 8.48 9.56   HEMOGLOBIN g/dL 8.0* 6.9* 8.1*   PLATELETS Thousands/uL 496* 526* 541*     Results from last 7 days   Lab Units 05/27/25  0421 05/26/25  0501 05/25/25  0540   SODIUM mmol/L 136 135 135   POTASSIUM mmol/L 4.0 4.5 4.6   CHLORIDE mmol/L 104 105 105   CO2 mmol/L 25 25 25   BUN mg/dL 11 10 9   CREATININE mg/dL 0.97 0.91 0.91   EGFR ml/min/1.73sq m 73 79 79   CALCIUM mg/dL 8.3* 8.4 8.3*   AST U/L 12*  --   --    ALT U/L 8  --   --    ALK PHOS U/L 51  --   --    ALBUMIN g/dL 2.9*  --   --

## 2025-05-27 NOTE — PROGRESS NOTES
Progress Note - Hospitalist   Name: Gold Van 79 y.o. male I MRN: [id number]  Unit/Bed#: [place] [address] -01 I Date of Admission: 5/11/2025   Date of Service: 5/27/2025 I Hospital Day: 16    Assessment & Plan  Sepsis without acute organ dysfunction (HCC)  History of diabetes mellitus paroxysmal atrial fibrillation asthma cardiomyopathy and PAD presented to [facility] with right foot wound  Met sepsis criteria at [facility]  Blood cultures no growth   Transferred here for vascular surgery  Now status post right fifth ray amputation 5/16/2025  Status post fourth ray resection 5/20/2025  Intraoperative cultures Serratia/Enterococcus with Pseudomonas  Appreciate ID evaluation.  Currently on pip-tazo.  Discharging with amoxicillin (for enterococcus) and levofloxacin (for serratia and pseudomonas) end date 5/30/2025.  7-day postoperative course  Severe peripheral arterial disease (HCC)  Transferred here for vascular surgery intervention  Underwent right CFA SFA endarterectomy/stenting 5/13/2025  Aspirin discontinued.  Started on clopidogrel for stents.  Continue statin  Osteomyelitis ([facility])  Right fifth metatarsal ulceration with underlying osteomyelitis and surrounding right foot cellulitis  5/16: Underwent right partial fifth ray resection  5/20 right partial fourth ray amputation, wound debridement  Intraoperative cultures with Serratia/Enterococcus, and Pseudomonas  Antibiotics as outlined above  Troponin I above reference range  Patient developed acute onset of dyspnea evening of 5/18 after blood transfusion  EKG with transient lateral ST depression, and elevated trops  Seen by cardiology.  Elevated troponin secondary to volume overload  Continue clopidogrel and statin    Lab Results   Component Value Date    HSTNI0 100 (H) 05/23/2025    HSTNI2 91 (H) 05/23/2025    HSTNI4 92 (H) 05/23/2025    HSTNI 159 (H) 05/21/2025     Chronic heart failure with preserved ejection fraction (HFpEF) ([facility])  Last known LVEF 50%  "echocardiogram 2024  Briefly volume overloaded due to blood transfusion  Postoperative anemia  Baseline hemoglobin appears to range 10-12  Transfused total 2 units PRBC    Results from last 7 days   Lab Units 05/27/25  0421 05/26/25  0501 05/25/25  0540   HEMOGLOBIN g/dL 8.0* 6.9* 8.1*   MCV fL 93 93 92     Type 2 diabetes mellitus with complication, without long-term current use of insulin ([facility])  Lab Results   Component Value Date    HGBA1C 9.3 (H) 04/09/2025     Recent Labs     05/26/25  1618 05/26/25 2015 05/27/25  0612 05/27/25  1115   POCGLU 293* 285* 132 209*     Prior to admission meds: glipizide, linagliptin and metformin  A1c 9.3.  Endocrinology consulted by vascular surgery  Glucose currently stable continue glipizide 10 mg twice daily and sliding scale  PAF (paroxysmal atrial fibrillation) ([facility])  Follows with [facility].  Continue atenolol  Anticoagulation: Continue apixaban  Essential hypertension  Continue atenolol 25 mg daily  Due to low blood pressures lisinopril decreased to 5 mg daily and holding amlodipine  Mild intermittent asthma without complication  No exacerbation; prior to admission on Breztri.  Continue equivalent  Pruritus  Chronic pruritus follows with dermatology as an outpatient on prednisone 5 mg daily  Lung nodule  Chest x-ray with incidental finding of \"nodular opacity at left base\"  Outpatient chest x-ray in 6 weeks    VTE Pharmacologic Prophylaxis: VTE Score: 5 High Risk (Score >/= 5) - Pharmacological DVT Prophylaxis Ordered: apixaban (Eliquis). Sequential Compression Devices Ordered.    Mobility:   Basic Mobility Inpatient Raw Score: 12  JH-HLM Goal: 4: Move to chair/commode  JH-HLM Achieved: 4: Move to chair/commode  JH-HLM Goal achieved. Continue to encourage appropriate mobility.    Patient Centered Rounds: I have performed bedside rounds with nursing staff today.  Discussions with Specialists or Other Care Team Provider: Case management infectious disease podiatry    Education and " Discussions with Family / Patient:     Current Length of Stay: 16 day(s)  Current Patient Status: Inpatient   Certification Statement: The patient will continue to require additional inpatient hospital stay due to rehab placement  Discharge Plan: Awaiting rehab facility/placement    Code Status: Level 1 - Full Code    Subjective   Patient seen and examined.  Patient no complaints.  VAC replaced by podiatry this morning    Objective   Vitals:   Temp (24hrs), Av °F (36.7 °C), Min:97.7 °F (36.5 °C), Max:98.8 °F (37.1 °C)    Temp:  [97.7 °F (36.5 °C)-98.8 °F (37.1 °C)] 97.7 °F (36.5 °C)  HR:  [76-89] 78  Resp:  [14-18] 16  BP: (107-131)/(61-88) 124/88  SpO2:  [96 %-99 %] 99 %  Body mass index is 24.85 kg/m².     Input and Output Summary (last 24 hours):     Intake/Output Summary (Last 24 hours) at 2025 1545  Last data filed at 2025 1300  Gross per 24 hour   Intake 1750 ml   Output 1300 ml   Net 450 ml       Physical Exam  Vitals reviewed.   Constitutional:       General: He is not in acute distress.  HENT:      Head: Atraumatic.     Cardiovascular:      Rate and Rhythm: Regular rhythm.      Heart sounds: Normal heart sounds.   Pulmonary:      Effort: Pulmonary effort is normal.      Breath sounds: Decreased breath sounds present. No wheezing.   Abdominal:      General: Bowel sounds are normal.      Palpations: Abdomen is soft.      Tenderness: There is no abdominal tenderness.     Musculoskeletal:         General: Tenderness and deformity present.      Comments: Right foot     Skin:     General: Skin is warm and dry.     Neurological:      General: No focal deficit present.      Mental Status: He is alert.      Cranial Nerves: No cranial nerve deficit.     Psychiatric:         Mood and Affect: Mood normal.       Lines/Drains:  Invasive Devices       Peripheral Intravenous Line  Duration             Peripheral IV 25 Left;Ventral (anterior) Forearm 1 day                            Lab Results: I have  reviewed the following results:   Results from last 7 days   Lab Units 05/27/25  0421 05/26/25  0501 05/25/25  0540   WBC Thousand/uL 7.93 8.48 9.56   HEMOGLOBIN g/dL 8.0* 6.9* 8.1*   PLATELETS Thousands/uL 496* 526* 541*   MCV fL 93 93 92     Results from last 7 days   Lab Units 05/27/25  0421 05/26/25  0501 05/25/25  0540   SODIUM mmol/L 136 135 135   POTASSIUM mmol/L 4.0 4.5 4.6   CHLORIDE mmol/L 104 105 105   CO2 mmol/L 25 25 25   ANION GAP mmol/L 7 5 5   BUN mg/dL 11 10 9   CREATININE mg/dL 0.97 0.91 0.91   CALCIUM mg/dL 8.3* 8.4 8.3*   ALBUMIN g/dL 2.9*  --   --    TOTAL BILIRUBIN mg/dL 0.42  --   --    ALK PHOS U/L 51  --   --    ALT U/L 8  --   --    AST U/L 12*  --   --    EGFR ml/min/1.73sq m 73 79 79   GLUCOSE RANDOM mg/dL 143* 170* 155*     Results from last 7 days   Lab Units 05/23/25  0503 05/21/25  2018 05/21/25  0458   MAGNESIUM mg/dL 2.0 2.0 1.9         Results from last 7 days   Lab Units 05/23/25 2002 05/23/25  1828 05/23/25  1602   HS TNI 0HR ng/L  --   --  100*   HS TNI 2HR ng/L  --  91*  --    HS TNI 4HR ng/L 92*  --   --               Results from last 7 days   Lab Units 05/27/25  1115 05/27/25  0612 05/26/25 2015 05/26/25  1618 05/26/25  1115 05/26/25  0741 05/25/25  2103 05/25/25  1603 05/25/25  1103 05/25/25  0727 05/24/25  2117 05/24/25  1621   POC GLUCOSE mg/dl 209* 132 285* 293* 231* 156* 260* 278* 359* 147* 286* 273*               Recent Cultures (last 7 days):         Imaging:  Reviewed radiology reports from this admission including:  XR foot 3+ vw right  Result Date: 5/16/2025  Impression: Postop right foot. No acute osseous abnormality. Workstation performed: [id number]     XR or angio leg rt  Result Date: 5/16/2025  Impression: Fluoroscopy provided for procedure guidance. Please refer to the separate procedure note for additional details. Workstation performed: [id number]       Last 24 Hours Medication List:     Current Facility-Administered Medications:     acetaminophen  (TYLENOL) tablet 650 mg, Q8H SANDRA    albuterol (PROVENTIL HFA,VENTOLIN HFA) inhaler 1 puff, Q4H PRN    apixaban (ELIQUIS) tablet 5 mg, BID    atenolol (TENORMIN) tablet 25 mg, Daily    atorvastatin (LIPITOR) tablet 40 mg, QPM    Budeson-Glycopyrrol-Formoterol 160-9-4.8 MCG/ACT AERO 2 puff, BID    clopidogrel (PLAVIX) tablet 75 mg, Daily    docusate sodium (COLACE) capsule 100 mg, BID    famotidine (PEPCID) tablet 20 mg, BID    glipiZIDE (GLUCOTROL XL) 24 hr tablet 10 mg, BID AC    insulin lispro (HumALOG/ADMELOG) 100 units/mL subcutaneous injection 1-5 Units, TID AC **AND** Fingerstick Glucose (POCT), TID AC    insulin lispro (HumALOG/ADMELOG) 100 units/mL subcutaneous injection 1-5 Units, HS    levalbuterol (XOPENEX) inhalation solution 1.25 mg, Q6H PRN    lidocaine (LIDODERM) 5 % patch 1 patch, Daily    [START ON 5/28/2025] lisinopril (ZESTRIL) tablet 5 mg, Daily    metoprolol (LOPRESSOR) injection 5 mg, Q6H PRN    montelukast (SINGULAIR) tablet 10 mg, HS    morphine injection 2 mg, Q4H PRN    multivitamin-minerals (CENTRUM) tablet 1 tablet, Daily    ondansetron (ZOFRAN) injection 4 mg, Q6H PRN    oxyCODONE (ROXICODONE) IR tablet 5 mg, Q4H PRN **OR** oxyCODONE (ROXICODONE) immediate release tablet 10 mg, Q4H PRN    [COMPLETED] piperacillin-tazobactam (ZOSYN) 4.5 g in sodium chloride 0.9 % 100 mL IV LOADING DOSE, Once, Last Rate: Stopped (05/20/25 1245) **FOLLOWED BY** piperacillin-tazobactam (ZOSYN) 4.5 g in sodium chloride 0.9 % 100 mL IVPB (EXTENDED INFUSION), Q8H, Last Rate: 4.5 g (05/27/25 0848)    polyethylene glycol (MIRALAX) packet 17 g, Daily PRN    predniSONE tablet 5 mg, Daily    senna (SENOKOT) tablet 8.6 mg, Daily    Administrative Statements   Today, Patient Was Seen By: Bean Sweeney, DO  I have spent a total time of 35 minutes in caring for this patient on the day of the visit/encounter including Counseling / Coordination of care, Documenting in the medical record, Reviewing/placing orders in the  medical record (including tests, medications, and/or procedures), and Communicating with other healthcare professionals .    **Please Note: This note may have been constructed using a voice recognition system.**

## 2025-05-27 NOTE — ASSESSMENT & PLAN NOTE
Admit on initial presentation to [facility] with tachycardia and leukocytosis, secondary to diabetic right foot infection.  Blood cultures negative.  As below patient is with ongoing surgical site infection.  He is afebrile and hemodynamically stable.  -Antibiotic plan as below  -Monitor temperature/WBC  -Supportive care per primary team

## 2025-05-27 NOTE — PLAN OF CARE
Problem: PAIN - ADULT  Goal: Verbalizes/displays adequate comfort level or baseline comfort level  Description: Interventions:- Encourage patient to monitor pain and request assistance- Assess pain using appropriate pain scale- Administer analgesics based on type and severity of pain and evaluate response- Implement non-pharmacological measures as appropriate and evaluate response- Consider cultural and social influences on pain and pain management- Notify physician/advanced practitioner if interventions unsuccessful or patient reports new pain  Outcome: Progressing     Problem: DISCHARGE PLANNING  Goal: Discharge to home or other facility with appropriate resources  Description: INTERVENTIONS:- Identify barriers to discharge w/patient and caregiver- Arrange for needed discharge resources and transportation as appropriate- Identify discharge learning needs (meds, wound care, etc.)- Arrange for interpretive services to assist at discharge as needed- Refer to Case Management Department for coordinating discharge planning if the patient needs post-hospital services based on physician/advanced practitioner order or complex needs related to functional status, cognitive ability, or social support system  Outcome: Progressing     Problem: SKIN/TISSUE INTEGRITY - ADULT  Goal: Skin Integrity remains intact(Skin Breakdown Prevention)  Description: Assess:-Perform Sae assessment -Clean and moisturize skin -Inspect skin when repositioning, toileting, and assisting with ADLS-Assess under medical devices -Assess extremities for adequate circulation and sensation Bed Management:-Have minimal linens on bed & keep smooth, unwrinkled-Change linens as needed when moist or perspiring-Avoid sitting or lying in one position for more than 2 hours while in bed-Keep HOB at 30 degrees Toileting:-Offer bedside commode-Assess for incontinence -Use incontinent care products after each incontinent episode Activity:-Mobilize patient 3 times  a day-Encourage activity and walks on unit-Encourage or provide ROM exercises -Turn and reposition patient every 2 Hours-Use appropriate equipment to lift or move patient in bed-Instruct/ Assist with weight shifting  when out of bed in chair-Consider limitation of chair time 2 hour intervalsSkin Care:-Avoid use of baby powder, tape, friction and shearing, hot water or constrictive clothing-Relieve pressure over bony prominences Do not massage red bony areasNext Steps:-Teach patient strategies to minimize risks Consider consults to  interdisciplinary teams   Outcome: Progressing  Goal: Incision(s), wounds(s) or drain site(s) healing without S/S of infection  Description: INTERVENTIONS- Assess and document dressing, incision, wound bed, drain sites and surrounding tissue- Provide patient and family education- Perform skin care/dressing changes every shift  Outcome: Progressing  Goal: Pressure injury heals and does not worsen  Description: Interventions:- Implement low air loss mattress or specialty surface (Criteria met)- Apply silicone foam dressing- Instruct/assist with weight shifting every 120 minutes when in chair - Limit chair time to 2 hour intervals- Use special pressure reducing interventions such as turning when in chair - Apply fecal or urinary incontinence containment device - Perform passive or active ROM every 2 hours- Turn and reposition patient & offload bony prominences every 2 hours - Utilize friction reducing device or surface for transfers - Consider consults to  interdisciplinary teams such as wound- Use incontinent care products after each incontinent episode such as wipes- Consider nutrition services referral as needed  Outcome: Progressing     Problem: Nutrition/Hydration-ADULT  Goal: Nutrient/Hydration intake appropriate for improving, restoring or maintaining nutritional needs  Description: Monitor and assess patient's nutrition/hydration status for malnutrition. Collaborate with  interdisciplinary team and initiate plan and interventions as ordered.  Monitor patient's weight and dietary intake as ordered or per policy. Utilize nutrition screening tool and intervene as necessary. Determine patient's food preferences and provide high-protein, high-caloric foods as appropriate. INTERVENTIONS:- Monitor oral intake, urinary output, labs, and treatment plans- Assess nutrition and hydration status and recommend course of action- Evaluate amount of meals eaten- Assist patient with eating if necessary - Allow adequate time for meals- Recommend/ encourage appropriate diets, oral nutritional supplements, and vitamin/mineral supplements- Order, calculate, and assess calorie counts as needed- Recommend, monitor, and adjust tube feedings and TPN/PPN based on assessed needs- Assess need for intravenous fluids- Provide specific nutrition/hydration education as appropriate- Include patient/family/caregiver in decisions related to nutrition  Outcome: Progressing     Problem: Prexisting or High Potential for Compromised Skin Integrity  Goal: Skin integrity is maintained or improved  Description: INTERVENTIONS:  - Identify patients at risk for skin breakdown  - Assess and monitor skin integrity  - Assess and monitor nutrition and hydration status  - Monitor labs   - Assess for incontinence   - Turn and reposition patient  - Assist with mobility/ambulation  - Relieve pressure over bony prominences  - Avoid friction and shearing  - Provide appropriate hygiene as needed including keeping skin clean and dry  - Evaluate need for skin moisturizer/barrier cream  - Collaborate with interdisciplinary team   - Patient/family teaching  - Consider wound care consult   Outcome: Progressing     Problem: INFECTION - ADULT  Goal: Absence or prevention of progression during hospitalization  Description: INTERVENTIONS:  - Assess and monitor for signs and symptoms of infection  - Monitor lab/diagnostic results  - Monitor all  insertion sites, i.e. indwelling lines, tubes, and drains  - Monitor endotracheal if appropriate and nasal secretions for changes in amount and color  - [place] appropriate cooling/warming therapies per order  - Administer medications as ordered  - Instruct and encourage patient and family to use good hand hygiene technique  - Identify and instruct in appropriate isolation precautions for identified infection/condition  Outcome: Progressing  Goal: Absence of fever/infection during neutropenic period  Description: INTERVENTIONS:  - Monitor WBC    Outcome: Progressing     Problem: SAFETY ADULT  Goal: Patient will remain free of falls  Description: INTERVENTIONS:  - Educate patient/family on patient safety including physical limitations  - Instruct patient to call for assistance with activity   - Consult OT/PT to assist with strengthening/mobility   - Keep Call bell within reach  - Keep bed low and locked with side rails adjusted as appropriate  - Keep care items and personal belongings within reach  - Initiate and maintain comfort rounds  - Make Fall Risk Sign visible to staff  - Offer Toileting every 2 Hours, in advance of need  - Initiate/Maintain bed alarm  - Obtain necessary fall risk management equipment:   - Apply yellow socks and bracelet for high fall risk patients  - Consider moving patient to room near nurses station  Outcome: Progressing  Goal: Maintain or return to baseline ADL function  Description: INTERVENTIONS:  -  Assess patient's ability to carry out ADLs; assess patient's baseline for ADL function and identify physical deficits which impact ability to perform ADLs (bathing, care of mouth/teeth, toileting, grooming, dressing, etc.)  - Assess/evaluate cause of self-care deficits   - Assess range of motion  - Assess patient's mobility; develop plan if impaired  - Assess patient's need for assistive devices and provide as appropriate  - Encourage maximum independence but intervene and supervise when  necessary  - Involve family in performance of ADLs  - Assess for home care needs following discharge   - Consider OT consult to assist with ADL evaluation and planning for discharge  - Provide patient education as appropriate  Outcome: Progressing  Goal: Maintains/Returns to pre admission functional level  Description: INTERVENTIONS:  - Perform AM-PAC 6 Click Basic Mobility/ Daily Activity assessment daily.  - Set and communicate daily mobility goal to care team and patient/family/caregiver.   - Collaborate with rehabilitation services on mobility goals if consulted  - Perform Range of Motion 4 times a day.  - Reposition patient every 3 hours.  - Dangle patient 3 times a day  - Stand patient 3 times a day  - Ambulate patient 3 times a day  - Out of bed to chair 3 times a day   - Out of bed for meals 3 times a day  - Out of bed for toileting  - Record patient progress and toleration of activity level   Outcome: Progressing     Problem: Knowledge Deficit  Goal: Patient/family/caregiver demonstrates understanding of disease process, treatment plan, medications, and discharge instructions  Description: Complete learning assessment and assess knowledge base.  Interventions:  - Provide teaching at level of understanding  - Provide teaching via preferred learning methods  Outcome: Progressing     Problem: METABOLIC, FLUID AND ELECTROLYTES - ADULT  Goal: Electrolytes maintained within normal limits  Description: INTERVENTIONS:  - Monitor labs and assess patient for signs and symptoms of electrolyte imbalances  - Administer electrolyte replacement as ordered  - Monitor response to electrolyte replacements, including repeat lab results as appropriate  - Instruct patient on fluid and nutrition as appropriate  Outcome: Progressing     Problem: HEMATOLOGIC - ADULT  Goal: Maintains hematologic stability  Description: INTERVENTIONS  - Assess for signs and symptoms of bleeding or hemorrhage  - Monitor labs  - Administer supportive  blood products/factors as ordered and appropriate  Outcome: Progressing     Problem: CARDIOVASCULAR - ADULT  Goal: Maintains optimal cardiac output and hemodynamic stability  Description: INTERVENTIONS:  - Monitor I/O, vital signs and rhythm  - Monitor for S/S and trends of decreased cardiac output  - Administer and titrate ordered vasoactive medications to optimize hemodynamic stability  - Assess quality of pulses, skin color and temperature  - Assess for signs of decreased coronary artery perfusion  - Instruct patient to report change in severity of symptoms  Outcome: Progressing  Goal: Absence of cardiac dysrhythmias or at baseline rhythm  Description: INTERVENTIONS:  - Continuous cardiac monitoring, vital signs, obtain 12 lead EKG if ordered  - Administer antiarrhythmic and heart rate control medications as ordered  - Monitor electrolytes and administer replacement therapy as ordered  Outcome: Progressing     Problem: MOBILITY - ADULT  Goal: Maintain or return to baseline ADL function  Description: INTERVENTIONS:  -  Assess patient's ability to carry out ADLs; assess patient's baseline for ADL function and identify physical deficits which impact ability to perform ADLs (bathing, care of mouth/teeth, toileting, grooming, dressing, etc.)  - Assess/evaluate cause of self-care deficits   - Assess range of motion  - Assess patient's mobility; develop plan if impaired  - Assess patient's need for assistive devices and provide as appropriate  - Encourage maximum independence but intervene and supervise when necessary  - Involve family in performance of ADLs  - Assess for home care needs following discharge   - Consider OT consult to assist with ADL evaluation and planning for discharge  - Provide patient education as appropriate  Outcome: Progressing  Goal: Maintains/Returns to pre admission functional level  Description: INTERVENTIONS:  - Perform AM-PAC 6 Click Basic Mobility/ Daily Activity assessment daily.  - Set  and communicate daily mobility goal to care team and patient/family/caregiver.   - Collaborate with rehabilitation services on mobility goals if consulted  - Perform Range of Motion 4 times a day.  - Reposition patient every 3 hours.  - Dangle patient 3 times a day  - Stand patient 3 times a day  - Ambulate patient 3 times a day  - Out of bed to chair 3 times a day   - Out of bed for meals 3 times a day  - Out of bed for toileting  - Record patient progress and toleration of activity level   Outcome: Progressing

## 2025-05-27 NOTE — ASSESSMENT & PLAN NOTE
"Chest x-ray with incidental finding of \"nodular opacity at left base\"  Outpatient chest x-ray in 6 weeks  " Discussion was held with the patient today regarding ACP documentation.   Patient does not wish to complete ACP documents at this time.

## 2025-05-27 NOTE — CASE MANAGEMENT
Case Management Discharge Planning Note    Patient name Gold Van  Location [place] [address] /[facility] 2 M* MRN [id number]  : 1945 Date 2025       Current Admission Date: 2025  Current Admission Diagnosis:Sepsis without acute organ dysfunction (Formerly [facility])   Patient Active Problem List    Diagnosis Date Noted    Osteomyelitis (Formerly [facility]) 2025    Troponin I above reference range 2025    Volume overload 2025    Postoperative anemia 2025    Pulmonary hypertension (Formerly [facility]) 2025    Sepsis without acute organ dysfunction (Formerly [facility]) 2025    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 (Formerly [facility]) 2025    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 ([facility]ly [facility]) 2025    PAF (paroxysmal atrial fibrillation) (Formerly [facility]) 2025    Chronic heart failure with preserved ejection fraction (HFpEF) (Formerly [facility]) 2025    Ulcer of right foot with fat layer exposed secondary to osteomyelitis 2025    Severe peripheral arterial disease ([facility]ly [facility]) 2024    Moderate protein-calorie malnutrition ([facility]ly [facility]) 10/09/2024    Gastroesophageal reflux disease without esophagitis 10/04/2024    Impaired mobility and activities of daily living 10/04/2024    Neuropathy 10/04/2024    Foot drop, left 10/04/2024    Abnormal echocardiogram 10/03/2024    CVA (cerebrovascular accident) ([facility]) 10/02/2024    Dysmetria 10/01/2024    COVID-19 2024    Acute respiratory insufficiency 2024    Shortness of breath 2024    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[facility]ly [facility]) 2024    History of pneumothorax 2024    Acute respiratory failure with hypoxia ([facility]) 2024    Non-recurrent bilateral inguinal hernia without obstruction or gangrene 2024    Pruritus 2024    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 2021    Hyponatremia 2021    Lung nodule 2021    Type 2 diabetes mellitus with complication, without long-term current use of insulin  ([facility]) 10/23/2017    Essential hypertension 10/23/2017    Mild intermittent asthma without complication 10/23/2017    Mixed hyperlipidemia 10/23/2017      LOS (days): 16  Geometric Mean LOS (GMLOS) (days): 9.6  Days to GMLOS:-5.9     OBJECTIVE:  Risk of Unplanned Readmission Score: 35.42         Current admission status: Inpatient   Preferred Pharmacy:   [facility] - [place], [place] - [address]  [address] [facility] [address]  Phone: [phone] Fax: [phone]    [facility] - [place], [place] - [address]  [address]  [facility] [address]  Phone: [phone] Fax: [phone]    [facility] ([facility]) #[id number] [place], [place] - [address] [address]  Phone: [phone] Fax: [phone]    Primary Care Provider: Shayne Diaz MD    Primary Insurance: [employer]  REP  Secondary Insurance:     DISCHARGE DETAILS:    Discharge planning discussed with:: pt and pt's wife (via phone)  Freedom of Choice: Yes  Comments - Freedom of Choice: Wife is rodney Garcia this day- requested to submit again to [facility] and phillip Rosa [facility] is not available as has no beds.  Made aware pt is stable from attending and podiatry stand point for d/c this day.  CM contacted family/caregiver?: Yes  Were Treatment Team discharge recommendations reviewed with patient/caregiver?: Yes  Did patient/caregiver verbalize understanding of patient care needs?: Yes       Contacts  Patient Contacts: Mya Van  Relationship to Patient:: Family  Contact Method: Phone  Phone Number: [phone]  Reason/Outcome: Discharge Planning                        Treatment Team Recommendation: Short Term Rehab  Discharge Destination Plan:: Short Term Rehab, SNF  Transport at Discharge : Deacon lucero                             IMM Given (Date):: 05/27/25  IMM Given to:: Patient

## 2025-05-27 NOTE — DISCHARGE SUPPORT
Case Management Assessment & Discharge Planning Note    Patient name Gold Van  Location [place] [address] /[place] 2 M* MRN [id number]  : 1945 Date 2025       Current Admission Date: 2025  Current Admission Diagnosis:Sepsis without acute organ dysfunction ([facility])   Patient Active Problem List    Diagnosis Date Noted    Osteomyelitis ([facility]) 2025    Troponin I above reference range 2025    Volume overload 2025    Postoperative anemia 2025    Pulmonary hypertension ([facility]) 2025    Sepsis without acute organ dysfunction ([facility]) 2025    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 ([facility]) 2025    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 ([facility]) 2025    PAF (paroxysmal atrial fibrillation) ([facility]) 2025    Chronic heart failure with preserved ejection fraction (HFpEF) ([facility]) 2025    Ulcer of right foot with fat layer exposed secondary to osteomyelitis 2025    Severe peripheral arterial disease ([facility]) 2024    Moderate protein-calorie malnutrition ([facility]) 10/09/2024    Gastroesophageal reflux disease without esophagitis 10/04/2024    Impaired mobility and activities of daily living 10/04/2024    Neuropathy 10/04/2024    Foot drop, left 10/04/2024    Abnormal echocardiogram 10/03/2024    CVA (cerebrovascular accident) ([facility]) 10/02/2024    Dysmetria 10/01/2024    COVID-19 2024    Acute respiratory insufficiency 2024    Shortness of breath 2024    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[facility]) 2024    History of pneumothorax 2024    Acute respiratory failure with hypoxia ([facility]) 2024    Non-recurrent bilateral inguinal hernia without obstruction or gangrene 2024    Pruritus 2024    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 2021    Hyponatremia 2021    Lung nodule 2021    Type 2 diabetes mellitus with complication, without long-term current  use of insulin (HCC) 10/23/2017    Essential hypertension 10/23/2017    Mild intermittent asthma without complication 10/23/2017    Mixed hyperlipidemia 10/23/2017      LOS (days): 16  Geometric Mean LOS (GMLOS) (days): 9.6  Days to GMLOS:-5.8   Facility Authorization Initiated  [facility] received request for auth from : Jennifer Marin  Authorization Request Submitted for: SNF  Requested Start of Care Date: 05/27/25  Facility Name: [facility]  Facility NPI: [id number]  Facility MD: Yarelis JOHNSON Tracy  Facility MD NPI: [id number]  Authorization initiated by contacting insurance: [employer]  Via: [employer]  Clinicals submitted via: Portal Attachment  Pending reference #: [id number]  Other Notes: CM informed insurance may request updated PT and OT notes since evals are at the 48hr ramone.   notified: Angelica Berg     Updates to authorization status will be noted in chart.    Please reach out to CM for updates on any clinical information.

## 2025-05-27 NOTE — PROGRESS NOTES
Patient:  LENA SCHMIDT    MRN:  [id number]    Aidin Request ID:  [id number]    Level of care reserved:  Skilled Nursing Facility    Partner Reserved:  [facility], [place] [address] [phone]    Clinical needs requested:    Geography searched:  20 miles around [id number]    Start of Service:    Request sent:  10:55am EDT on 5/19/2025 by Edith Soto    Partner reserved:  2:38pm EDT on 5/27/2025 by Edith Soto    Choice list shared:  2:38pm EDT on 5/27/2025 by Edith Soto

## 2025-05-27 NOTE — ASSESSMENT & PLAN NOTE
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  Currently on pip-tazo.  Discharging with amoxicillin (for enterococcus) and levofloxacin (for serratia and pseudomonas) end date 5/30/2025.  7-day postoperative course

## 2025-05-27 NOTE — PROGRESS NOTES
Podiatry - Progress Note  Patient: Gold Van 79 y.o. male   MRN: [id number]  PCP: Shayne Diaz MD  Unit/Bed#: [address] [address] Encounter: [id number]  Date Of Visit: 25    ASSESSMENT:    Gold Van is a 79 y.o. 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 Running at 125 mmHg continuous with good seal. Surgical site is stable with no acute clinical signs of infection. Stable from podiatry standpoint for discharge.   Discussed right foot with patient. Explained that although there are no signs of infection, he is still at risk for limb loss given exposed bone and fibrotic tissue. At this time we will continue to attempt VAC therapy for wound healing.   Elevation and offloading on green foam wedges or pillows when non-ambulatory.  Rest of care per primary team.    Wound VAC application  1. Number of sponges: 1 black sponge  2. Pressure settin continuous  3. Size of wound: 9.5 x 3.5 x 1.5 cm  4. Description of wound: fibrogranular wound base with exposed bone.     Weightbearing status: Non-weightbearing right foot    SUBJECTIVE:     The patient was seen, evaluated, and assessed at bedside today. The patient was awake, alert, and in no acute distress. No acute events overnight. The patient reports pain on VAC application. Patient denies N/V/F/chills/SOB/CP.      OBJECTIVE:     Vitals:   /66   Pulse 89   Temp 97.7 °F (36.5 °C)   Resp 16   Ht 6' (1.829 m)   Wt 83.1 kg (183 lb 3.2 oz)   SpO2 97%   BMI 24.85 kg/m²     Temp (24hrs), Av.1 °F (36.7 °C), Min:97.7 °F (36.5 °C), Max:98.8 °F (37.1 °C)      Physical Exam:     Lungs:  "Non labored breathing  Abdomen: Soft, non-tender.  Lower Extremity:  Cardiovascular status at baseline from admission.  Neurological status at baseline from admission.  Musculoskeletal status at baseline from admission. No calf tenderness noted.     Wound #: 1  Location: right lateral foot  Length 9.5cm: Width 3.5cm: Depth 1.5cm:   Deepest Tissue Noted in Base: surgical bone  Probe to Bone: Yes  Peripheral Skin Description: Attached  Granulation: 50% Fibrotic Tissue: 50% Necrotic Tissue: 0%   Drainage Amount: moderate serosanguinous  Signs of Infection: No    Clinical Images 05/27/25:      Additional Data:     Labs:    Results from last 7 days   Lab Units 05/27/25  0421 05/26/25  0501   WBC Thousand/uL 7.93 8.48   HEMOGLOBIN g/dL 8.0* 6.9*   HEMATOCRIT % 25.0* 22.1*   PLATELETS Thousands/uL 496* 526*   SEGS PCT %  --  71   LYMPHO PCT %  --  18   MONO PCT %  --  8   EOS PCT %  --  1     Results from last 7 days   Lab Units 05/27/25  0421   POTASSIUM mmol/L 4.0   CHLORIDE mmol/L 104   CO2 mmol/L 25   BUN mg/dL 11   CREATININE mg/dL 0.97   CALCIUM mg/dL 8.3*   ALK PHOS U/L 51   ALT U/L 8   AST U/L 12*           * I Have Reviewed All Lab Data Listed Above.    Recent Cultures (last 7 days):               Imaging: I have personally reviewed pertinent films in PACS  EKG, Pathology, and Other Studies: I have personally reviewed pertinent reports.    ** Please Note: Portions of the record may have been created with voice recognition software. Occasional wrong word or \"sound a like\" substitutions may have occurred due to the inherent limitations of voice recognition software. Read the chart carefully and recognize, using context, where substitutions have occurred. **     "

## 2025-05-27 NOTE — ASSESSMENT & PLAN NOTE
Lab Results   Component Value Date    HGBA1C 9.3 (H) 04/09/2025       Recent Labs     05/26/25  1115 05/26/25  1618 05/26/25 2015 05/27/25  0612   POCGLU 231* 293* 285* 132       Blood Sugar Average: Last 72 hrs:  (P) [id number]

## 2025-05-27 NOTE — NURSING NOTE
Pt was educated concerning prevention and seriousness of pressure injuries. Pt was advised to shift weight and turn from side to side after declining green wedges. Pt did request a pillow to off set weight of L arm.

## 2025-05-27 NOTE — ASSESSMENT & PLAN NOTE
Baseline hemoglobin appears to range 10-12  Transfused total 2 units PRBC    Results from last 7 days   Lab Units 05/27/25  0421 05/26/25  0501 05/25/25  0540   HEMOGLOBIN g/dL 8.0* 6.9* 8.1*   MCV fL 93 93 92

## 2025-05-28 VITALS
OXYGEN SATURATION: 93 % | DIASTOLIC BLOOD PRESSURE: 62 MMHG | BODY MASS INDEX: 24.96 KG/M2 | WEIGHT: 184.3 LBS | TEMPERATURE: 97.9 F | SYSTOLIC BLOOD PRESSURE: 124 MMHG | HEART RATE: 75 BPM | RESPIRATION RATE: 17 BRPM | HEIGHT: 72 IN

## 2025-05-28 LAB
GLUCOSE SERPL-MCNC: 156 MG/DL (ref 65–140)
GLUCOSE SERPL-MCNC: 79 MG/DL (ref 65–140)
GLUCOSE SERPL-MCNC: 82 MG/DL (ref 65–140)

## 2025-05-28 PROCEDURE — 82948 REAGENT STRIP/BLOOD GLUCOSE: CPT

## 2025-05-28 PROCEDURE — 99239 HOSP IP/OBS DSCHRG MGMT >30: CPT | Performed by: INTERNAL MEDICINE

## 2025-05-28 PROCEDURE — G0545 PR INHERENT VISIT TO INPT: HCPCS | Performed by: INTERNAL MEDICINE

## 2025-05-28 PROCEDURE — 99232 SBSQ HOSP IP/OBS MODERATE 35: CPT | Performed by: INTERNAL MEDICINE

## 2025-05-28 RX ORDER — LISINOPRIL 5 MG/1
5 TABLET ORAL DAILY
Start: 2025-05-29

## 2025-05-28 RX ORDER — OXYCODONE HYDROCHLORIDE 10 MG/1
10 TABLET ORAL EVERY 4 HOURS PRN
Qty: 10 TABLET | Refills: 0 | Status: SHIPPED | OUTPATIENT
Start: 2025-05-28 | End: 2025-05-28

## 2025-05-28 RX ORDER — PREDNISONE 5 MG/1
5 TABLET ORAL DAILY
COMMUNITY
Start: 2025-05-28

## 2025-05-28 RX ORDER — OXYCODONE HYDROCHLORIDE 10 MG/1
10 TABLET ORAL EVERY 4 HOURS PRN
Qty: 10 TABLET | Refills: 0 | Status: SHIPPED | OUTPATIENT
Start: 2025-05-28

## 2025-05-28 RX ORDER — POLYETHYLENE GLYCOL 3350 17 G/17G
17 POWDER, FOR SOLUTION ORAL DAILY PRN
Start: 2025-05-28

## 2025-05-28 RX ORDER — LEVOFLOXACIN 750 MG/1
750 TABLET, FILM COATED ORAL EVERY 24 HOURS
Start: 2025-05-28 | End: 2025-05-30

## 2025-05-28 RX ORDER — AMOXICILLIN 500 MG/1
500 CAPSULE ORAL EVERY 8 HOURS SCHEDULED
Start: 2025-05-28 | End: 2025-05-30

## 2025-05-28 RX ORDER — DOCUSATE SODIUM 100 MG/1
100 CAPSULE, LIQUID FILLED ORAL 2 TIMES DAILY
Start: 2025-05-28

## 2025-05-28 RX ADMIN — APIXABAN 5 MG: 5 TABLET, FILM COATED ORAL at 08:22

## 2025-05-28 RX ADMIN — LISINOPRIL 5 MG: 5 TABLET ORAL at 08:23

## 2025-05-28 RX ADMIN — FAMOTIDINE 20 MG: 20 TABLET, FILM COATED ORAL at 08:22

## 2025-05-28 RX ADMIN — ATENOLOL 25 MG: 50 TABLET ORAL at 08:23

## 2025-05-28 RX ADMIN — INSULIN LISPRO 1 UNITS: 100 INJECTION, SOLUTION INTRAVENOUS; SUBCUTANEOUS at 12:02

## 2025-05-28 RX ADMIN — PREDNISONE 5 MG: 5 TABLET ORAL at 08:22

## 2025-05-28 RX ADMIN — CLOPIDOGREL BISULFATE 75 MG: 75 TABLET, FILM COATED ORAL at 08:22

## 2025-05-28 RX ADMIN — OXYCODONE HYDROCHLORIDE 10 MG: 10 TABLET ORAL at 12:31

## 2025-05-28 RX ADMIN — PIPERACILLIN AND TAZOBACTAM 4.5 G: 36; 4.5 INJECTION, POWDER, LYOPHILIZED, FOR SOLUTION INTRAVENOUS at 08:22

## 2025-05-28 RX ADMIN — PIPERACILLIN AND TAZOBACTAM 4.5 G: 36; 4.5 INJECTION, POWDER, LYOPHILIZED, FOR SOLUTION INTRAVENOUS at 00:05

## 2025-05-28 RX ADMIN — OXYCODONE HYDROCHLORIDE 10 MG: 10 TABLET ORAL at 06:07

## 2025-05-28 RX ADMIN — Medication 1 TABLET: at 08:22

## 2025-05-28 RX ADMIN — ACETAMINOPHEN 650 MG: 325 TABLET, FILM COATED ORAL at 06:05

## 2025-05-28 RX ADMIN — BUDESONIDE, GLYCOPYRROLATE, AND FORMOTEROL FUMARATE 2 PUFF: 160; 9; 4.8 AEROSOL, METERED RESPIRATORY (INHALATION) at 08:25

## 2025-05-28 RX ADMIN — GLIPIZIDE 10 MG: 5 TABLET, FILM COATED, EXTENDED RELEASE ORAL at 06:05

## 2025-05-28 NOTE — ASSESSMENT & PLAN NOTE
Transferred here for vascular surgery intervention  Underwent right CFA SFA endarterectomy/stenting 5/13/2025  Aspirin discontinued.  Started on clopidogrel for stents.  Continue statin  Follow-up with vascular scheduled

## 2025-05-28 NOTE — PROGRESS NOTES
Progress Note - Infectious Disease   Name: Gold Van 79 y.o. male I MRN: [id number]  Unit/Bed#: [place] [address] -01 I Date of Admission: 5/11/2025   Date of Service: 5/28/2025 I Hospital Day: 17    Assessment & Plan  Sepsis without acute organ dysfunction (HCC)  Admit on initial presentation to [facility] with tachycardia and leukocytosis, secondary to diabetic right foot infection.  Blood cultures negative.  As below patient is with ongoing surgical site infection.  He is afebrile and hemodynamically stable.  -Antibiotic plan as below  -Monitor temperature/WBC  -Supportive care per primary team  Ulcer of right foot with fat layer exposed secondary to osteomyelitis  Known chronic diabetic foot ulcer, MRI in April suggestive of osteomyelitis of fifth metatarsal head.  Admitted with acute wound cellulitis status post right partial fifth ray resection with assumed surgical cure of osteomyelitis.  There was yellow viscous fluid within the metatarsal phalangeal joint consistent with infection.  On dressing change 5/19 patient had ongoing green-colored drainage concerning for ongoing soft tissue infection.  Patient will need further right foot washout.  OR cx from 5/16 grew Serratia, Enterococcus faecalis and Pseudomonas.  Patient has no major contraindication to fluoroquinolone; Qtc 446, no aortic aneurysms on CT.  Per podiatry a TMA was recommended however patient wanted to save as much as possible of his foot.  Patient was taken back to the OR on 5/23 with further I&D and right partial fourth ray resection and further resection of fifth metatarsal stump.  Both wounds were noted to be hard, viable with bleeding at surgery.  No purulence were encountered.  With absence of residual osteomyelitis, patient does not need long-term IV antibiotic.  Patient now has VAC in his foot wound.  - Continue IV Zosyn   - Treat x 7-day postop, through 5/30  - At discharge, transition to PO Amoxicillin 500 mg every 8 hours (for  Enterococcus) plus PO Levofloxacin 750 mg daily (for Pseudomonas and Serratia)  -Wound care/VAC per podiatry  Osteomyelitis (Former[facility])  Of fifth metatarsal head in setting of diabetic foot ulcer.  Now status post partial fifth ray resection with presumed surgical cure on 5/13.  Patient is status post repeat washout and resection of fifth metatarsal stump, with partial fourth ray resection, with residual bones hard, viable and with bleeding, all are signs of absence of residual osteomyelitis.  As in above, patient does not need long-term antibiotic.  - Antibiotic plan as above  - Wound care per podiatry  Severe peripheral arterial disease ([facility])  Status post vascular surgery intervention on 5/13 with right common femoral endarterectomy with bovine pericardial patch angioplasty, right SFA stenting, right angioplasty.  Type 2 diabetes mellitus with complication, without long-term current use of insulin ([facility])  Lab Results   Component Value Date    HGBA1C 9.3 (H) 04/09/2025   Significant risk factor for infection and poor wound healing.  -Tight glycemic control as per primary team  PAF (paroxysmal atrial fibrillation) ([facility])  On Eliquis at home.  Chronic heart failure with preserved ejection fraction (HFpEF) ([facility])  Cardiology following.    Discussed with patient in detail regarding the above plan.  Okay for discharge from ID viewpoint.  Plan for discharge today noted.    Antibiotics:  Zosyn   POD # 5    Subjective   Patient feels well.  Minimal pain in foot.  Temperature stays down.  No chills.  He is tolerating antibiotic well.  No nausea, vomiting or diarrhea.    Objective :  Temp:  [97.7 °F (36.5 °C)-97.8 °F (36.6 °C)] 97.8 °F (36.6 °C)  HR:  [75-78] 75  BP: (118-124)/(60-88) 118/60  Resp:  [16-18] 18  SpO2:  [97 %-99 %] 99 %  O2 Device: None (Room air)    Physical Exam:     General: Awake, alert, cooperative, no distress.   Neck:  Supple. No mass.  No lymphadenopathy.   Lungs: Expansion symmetric, no rales, no wheezing,  respirations unlabored.   Heart:  Regular rate and rhythm, S1 and S2 normal, no murmur.   Abdomen: Soft, nondistended, non-tender, bowel sounds active all four quadrants, no masses, no organomegaly.   Extremities: No edema.  Foot wound with VAC.  Minimal drainage.  No erythema/warmth.  Minimal tenderness.   Skin:  No rash.   Neuro: Moves all extremities.        Lab Results: I have reviewed the following results:  Results from last 7 days   Lab Units 05/27/25 0421 05/26/25  0501 05/25/25  0540   WBC Thousand/uL 7.93 8.48 9.56   HEMOGLOBIN g/dL 8.0* 6.9* 8.1*   PLATELETS Thousands/uL 496* 526* 541*     Results from last 7 days   Lab Units 05/27/25 0421 05/26/25  0501 05/25/25  0540   SODIUM mmol/L 136 135 135   POTASSIUM mmol/L 4.0 4.5 4.6   CHLORIDE mmol/L 104 105 105   CO2 mmol/L 25 25 25   BUN mg/dL 11 10 9   CREATININE mg/dL 0.97 0.91 0.91   EGFR ml/min/1.73sq m 73 79 79   CALCIUM mg/dL 8.3* 8.4 8.3*   AST U/L 12*  --   --    ALT U/L 8  --   --    ALK PHOS U/L 51  --   --    ALBUMIN g/dL 2.9*  --   --

## 2025-05-28 NOTE — PLAN OF CARE
Problem: PAIN - ADULT  Goal: Verbalizes/displays adequate comfort level or baseline comfort level  Description: Interventions:- Encourage patient to monitor pain and request assistance- Assess pain using appropriate pain scale- Administer analgesics based on type and severity of pain and evaluate response- Implement non-pharmacological measures as appropriate and evaluate response- Consider cultural and social influences on pain and pain management- Notify physician/advanced practitioner if interventions unsuccessful or patient reports new pain  Outcome: Progressing     Problem: DISCHARGE PLANNING  Goal: Discharge to home or other facility with appropriate resources  Description: INTERVENTIONS:- Identify barriers to discharge w/patient and caregiver- Arrange for needed discharge resources and transportation as appropriate- Identify discharge learning needs (meds, wound care, etc.)- Arrange for interpretive services to assist at discharge as needed- Refer to Case Management Department for coordinating discharge planning if the patient needs post-hospital services based on physician/advanced practitioner order or complex needs related to functional status, cognitive ability, or social support system  Outcome: Progressing     Problem: SKIN/TISSUE INTEGRITY - ADULT  Goal: Skin Integrity remains intact(Skin Breakdown Prevention)  Description: Assess:-Perform Sae assessment -Clean and moisturize skin -Inspect skin when repositioning, toileting, and assisting with ADLS-Assess under medical devices -Assess extremities for adequate circulation and sensation Bed Management:-Have minimal linens on bed & keep smooth, unwrinkled-Change linens as needed when moist or perspiring-Avoid sitting or lying in one position for more than 2 hours while in bed-Keep HOB at 30 degrees Toileting:-Offer bedside commode-Assess for incontinence -Use incontinent care products after each incontinent episode Activity:-Mobilize patient 3 times  a day-Encourage activity and walks on unit-Encourage or provide ROM exercises -Turn and reposition patient every 2 Hours-Use appropriate equipment to lift or move patient in bed-Instruct/ Assist with weight shifting  when out of bed in chair-Consider limitation of chair time 2 hour intervalsSkin Care:-Avoid use of baby powder, tape, friction and shearing, hot water or constrictive clothing-Relieve pressure over bony prominences Do not massage red bony areasNext Steps:-Teach patient strategies to minimize risks Consider consults to  interdisciplinary teams   Outcome: Progressing  Goal: Incision(s), wounds(s) or drain site(s) healing without S/S of infection  Description: INTERVENTIONS- Assess and document dressing, incision, wound bed, drain sites and surrounding tissue- Provide patient and family education- Perform skin care/dressing changes every shift  Outcome: Progressing  Goal: Pressure injury heals and does not worsen  Description: Interventions:- Implement low air loss mattress or specialty surface (Criteria met)- Apply silicone foam dressing- Instruct/assist with weight shifting every 120 minutes when in chair - Limit chair time to 2 hour intervals- Use special pressure reducing interventions such as turning when in chair - Apply fecal or urinary incontinence containment device - Perform passive or active ROM every 2 hours- Turn and reposition patient & offload bony prominences every 2 hours - Utilize friction reducing device or surface for transfers - Consider consults to  interdisciplinary teams such as wound- Use incontinent care products after each incontinent episode such as wipes- Consider nutrition services referral as needed  Outcome: Progressing     Problem: Nutrition/Hydration-ADULT  Goal: Nutrient/Hydration intake appropriate for improving, restoring or maintaining nutritional needs  Description: Monitor and assess patient's nutrition/hydration status for malnutrition. Collaborate with  interdisciplinary team and initiate plan and interventions as ordered.  Monitor patient's weight and dietary intake as ordered or per policy. Utilize nutrition screening tool and intervene as necessary. Determine patient's food preferences and provide high-protein, high-caloric foods as appropriate. INTERVENTIONS:- Monitor oral intake, urinary output, labs, and treatment plans- Assess nutrition and hydration status and recommend course of action- Evaluate amount of meals eaten- Assist patient with eating if necessary - Allow adequate time for meals- Recommend/ encourage appropriate diets, oral nutritional supplements, and vitamin/mineral supplements- Order, calculate, and assess calorie counts as needed- Recommend, monitor, and adjust tube feedings and TPN/PPN based on assessed needs- Assess need for intravenous fluids- Provide specific nutrition/hydration education as appropriate- Include patient/family/caregiver in decisions related to nutrition  Outcome: Progressing     Problem: Prexisting or High Potential for Compromised Skin Integrity  Goal: Skin integrity is maintained or improved  Description: INTERVENTIONS:  - Identify patients at risk for skin breakdown  - Assess and monitor skin integrity  - Assess and monitor nutrition and hydration status  - Monitor labs   - Assess for incontinence   - Turn and reposition patient  - Assist with mobility/ambulation  - Relieve pressure over bony prominences  - Avoid friction and shearing  - Provide appropriate hygiene as needed including keeping skin clean and dry  - Evaluate need for skin moisturizer/barrier cream  - Collaborate with interdisciplinary team   - Patient/family teaching  - Consider wound care consult   Outcome: Progressing     Problem: INFECTION - ADULT  Goal: Absence or prevention of progression during hospitalization  Description: INTERVENTIONS:  - Assess and monitor for signs and symptoms of infection  - Monitor lab/diagnostic results  - Monitor all  insertion sites, i.e. indwelling lines, tubes, and drains  - Monitor endotracheal if appropriate and nasal secretions for changes in amount and color  - [place] appropriate cooling/warming therapies per order  - Administer medications as ordered  - Instruct and encourage patient and family to use good hand hygiene technique  - Identify and instruct in appropriate isolation precautions for identified infection/condition  Outcome: Progressing  Goal: Absence of fever/infection during neutropenic period  Description: INTERVENTIONS:  - Monitor WBC    Outcome: Progressing     Problem: SAFETY ADULT  Goal: Patient will remain free of falls  Description: INTERVENTIONS:  - Educate patient/family on patient safety including physical limitations  - Instruct patient to call for assistance with activity   - Consult OT/PT to assist with strengthening/mobility   - Keep Call bell within reach  - Keep bed low and locked with side rails adjusted as appropriate  - Keep care items and personal belongings within reach  - Initiate and maintain comfort rounds  - Make Fall Risk Sign visible to staff  - Offer Toileting every 2 Hours, in advance of need  - Initiate/Maintain bed alarm  - Obtain necessary fall risk management equipment:   - Apply yellow socks and bracelet for high fall risk patients  - Consider moving patient to room near nurses station  Outcome: Progressing  Goal: Maintain or return to baseline ADL function  Description: INTERVENTIONS:  -  Assess patient's ability to carry out ADLs; assess patient's baseline for ADL function and identify physical deficits which impact ability to perform ADLs (bathing, care of mouth/teeth, toileting, grooming, dressing, etc.)  - Assess/evaluate cause of self-care deficits   - Assess range of motion  - Assess patient's mobility; develop plan if impaired  - Assess patient's need for assistive devices and provide as appropriate  - Encourage maximum independence but intervene and supervise when  necessary  - Involve family in performance of ADLs  - Assess for home care needs following discharge   - Consider OT consult to assist with ADL evaluation and planning for discharge  - Provide patient education as appropriate  Outcome: Progressing  Goal: Maintains/Returns to pre admission functional level  Description: INTERVENTIONS:  - Perform AM-PAC 6 Click Basic Mobility/ Daily Activity assessment daily.  - Set and communicate daily mobility goal to care team and patient/family/caregiver.   - Collaborate with rehabilitation services on mobility goals if consulted  - Perform Range of Motion 4 times a day.  - Reposition patient every 3 hours.  - Dangle patient 3 times a day  - Stand patient 3 times a day  - Ambulate patient 3 times a day  - Out of bed to chair 3 times a day   - Out of bed for meals 3 times a day  - Out of bed for toileting  - Record patient progress and toleration of activity level   Outcome: Progressing     Problem: Knowledge Deficit  Goal: Patient/family/caregiver demonstrates understanding of disease process, treatment plan, medications, and discharge instructions  Description: Complete learning assessment and assess knowledge base.  Interventions:  - Provide teaching at level of understanding  - Provide teaching via preferred learning methods  Outcome: Progressing     Problem: METABOLIC, FLUID AND ELECTROLYTES - ADULT  Goal: Electrolytes maintained within normal limits  Description: INTERVENTIONS:  - Monitor labs and assess patient for signs and symptoms of electrolyte imbalances  - Administer electrolyte replacement as ordered  - Monitor response to electrolyte replacements, including repeat lab results as appropriate  - Instruct patient on fluid and nutrition as appropriate  Outcome: Progressing     Problem: HEMATOLOGIC - ADULT  Goal: Maintains hematologic stability  Description: INTERVENTIONS  - Assess for signs and symptoms of bleeding or hemorrhage  - Monitor labs  - Administer supportive  blood products/factors as ordered and appropriate  Outcome: Progressing     Problem: CARDIOVASCULAR - ADULT  Goal: Maintains optimal cardiac output and hemodynamic stability  Description: INTERVENTIONS:  - Monitor I/O, vital signs and rhythm  - Monitor for S/S and trends of decreased cardiac output  - Administer and titrate ordered vasoactive medications to optimize hemodynamic stability  - Assess quality of pulses, skin color and temperature  - Assess for signs of decreased coronary artery perfusion  - Instruct patient to report change in severity of symptoms  Outcome: Progressing  Goal: Absence of cardiac dysrhythmias or at baseline rhythm  Description: INTERVENTIONS:  - Continuous cardiac monitoring, vital signs, obtain 12 lead EKG if ordered  - Administer antiarrhythmic and heart rate control medications as ordered  - Monitor electrolytes and administer replacement therapy as ordered  Outcome: Progressing     Problem: MOBILITY - ADULT  Goal: Maintain or return to baseline ADL function  Description: INTERVENTIONS:  -  Assess patient's ability to carry out ADLs; assess patient's baseline for ADL function and identify physical deficits which impact ability to perform ADLs (bathing, care of mouth/teeth, toileting, grooming, dressing, etc.)  - Assess/evaluate cause of self-care deficits   - Assess range of motion  - Assess patient's mobility; develop plan if impaired  - Assess patient's need for assistive devices and provide as appropriate  - Encourage maximum independence but intervene and supervise when necessary  - Involve family in performance of ADLs  - Assess for home care needs following discharge   - Consider OT consult to assist with ADL evaluation and planning for discharge  - Provide patient education as appropriate  Outcome: Progressing  Goal: Maintains/Returns to pre admission functional level  Description: INTERVENTIONS:  - Perform AM-PAC 6 Click Basic Mobility/ Daily Activity assessment daily.  - Set  and communicate daily mobility goal to care team and patient/family/caregiver.   - Collaborate with rehabilitation services on mobility goals if consulted  - Perform Range of Motion 4 times a day.  - Reposition patient every 3 hours.  - Dangle patient 3 times a day  - Stand patient 3 times a day  - Ambulate patient 3 times a day  - Out of bed to chair 3 times a day   - Out of bed for meals 3 times a day  - Out of bed for toileting  - Record patient progress and toleration of activity level   Outcome: Progressing

## 2025-05-28 NOTE — ASSESSMENT & PLAN NOTE
Lab Results   Component Value Date    HGBA1C 9.3 (H) 04/09/2025     Recent Labs     05/27/25  1638 05/27/25  2043 05/28/25  0625 05/28/25  0641   POCGLU 281* 184* 79 82     Prior to admission meds: glipizide, linagliptin and metformin  A1c 9.3.  Endocrinology consulted by vascular surgery  Glucose currently stable continue glipizide 10 mg twice daily

## 2025-05-28 NOTE — CASE MANAGEMENT
Case Management Progress Note    Patient name Gold Van  Location South 2 /South 2 M* MRN [id number]  : 1945 Date 2025       LOS (days): 17  Geometric Mean LOS (GMLOS) (days): 9.6  Days to GMLOS:-6.9        OBJECTIVE:        Current admission status: Inpatient  Preferred Pharmacy:   [facility] - [place], [place] - [address] [facility] [address]  Phone: [phone] Fax: [phone]    [facility] - [place], [place] - [address]  [address] [facility] [address]  Phone: [phone] Fax: [phone]    [facility] ([facility]) #[id number] [place], [place] - [address] [address]  Phone: [phone] Fax: [phone]    Primary Care Provider: Shayne Diaz MD    Primary Insurance: [facility] REP  Secondary Insurance:     PROGRESS NOTE: All parties aware of d/c this day to [facility] for STR with  of 1230.

## 2025-05-28 NOTE — ASSESSMENT & PLAN NOTE
History of diabetes mellitus paroxysmal atrial fibrillation asthma cardiomyopathy and PAD presented to [facility] with right foot wound  Met sepsis criteria at [facility]  Blood cultures no growth   Transferred here for vascular surgery  Now status post right fifth ray amputation 5/16/2025  Status post fourth ray resection 5/20/2025  Intraoperative cultures Serratia/Enterococcus with Pseudomonas  Appreciate ID evaluation.  Currently on pip-tazo.  Discharging with amoxicillin (for enterococcus) and levofloxacin (for serratia and pseudomonas) end date 5/30/2025.  7-day postoperative course

## 2025-05-28 NOTE — DISCHARGE SUPPORT
Case Management Assessment & Discharge Planning Note    Patient name Gold Van  Location [place] [address] /South 2 M* MRN [id number]  : 1945 Date 2025       Current Admission Date: 2025  Current Admission Diagnosis:Sepsis without acute organ dysfunction ([facility])   Patient Active Problem List    Diagnosis Date Noted    Osteomyelitis ([facility]) 2025    Troponin I above reference range 2025    Volume overload 2025    Postoperative anemia 2025    Pulmonary hypertension ([facility]) 2025    Sepsis without acute organ dysfunction ([facility]) 2025    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 ([facility]) 2025    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 ([facility]) 2025    PAF (paroxysmal atrial fibrillation) ([facility]) 2025    Chronic heart failure with preserved ejection fraction (HFpEF) ([facility]) 2025    Ulcer of right foot with fat layer exposed secondary to osteomyelitis 2025    Severe peripheral arterial disease ([facility]) 2024    Moderate protein-calorie malnutrition ([facility]) 10/09/2024    Gastroesophageal reflux disease without esophagitis 10/04/2024    Impaired mobility and activities of daily living 10/04/2024    Neuropathy 10/04/2024    Foot drop, left 10/04/2024    Abnormal echocardiogram 10/03/2024    CVA (cerebrovascular accident) ([facility]) 10/02/2024    Dysmetria 10/01/2024    COVID-19 2024    Acute respiratory insufficiency 2024    Shortness of breath 2024    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    History of pneumothorax 2024    Acute respiratory failure with hypoxia ([facility]) 2024    Non-recurrent bilateral inguinal hernia without obstruction or gangrene 2024    Pruritus 2024    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 2021    Hyponatremia 2021    Lung nodule 2021    Type 2 diabetes mellitus with complication, without long-term current  use of insulin ([facility]) 10/23/2017    Essential hypertension 10/23/2017    Mild intermittent asthma without complication 10/23/2017    Mixed hyperlipidemia 10/23/2017      LOS (days): 17  Geometric Mean LOS (GMLOS) (days): 9.6  Days to GMLOS:-6.8   Other  Transport Auth: Not Req’d  Other Notes: Stretcher Van not covered under Medicare   Notified: Edith RODRIGUEZ         Please reach out to CM for updates on any clinical information.

## 2025-05-28 NOTE — ASSESSMENT & PLAN NOTE
Known chronic diabetic foot ulcer, MRI in April suggestive of osteomyelitis of fifth metatarsal head.  Admitted with acute wound cellulitis status post right partial fifth ray resection with assumed surgical cure of osteomyelitis.  There was yellow viscous fluid within the metatarsal phalangeal joint consistent with infection.  On dressing change 5/19 patient had ongoing green-colored drainage concerning for ongoing soft tissue infection.  Patient will need further right foot washout.  OR cx from 5/16 grew Serratia, Enterococcus faecalis and Pseudomonas.  Patient has no major contraindication to fluoroquinolone; Qtc 446, no aortic aneurysms on CT.  Per podiatry a TMA was recommended however patient wanted to save as much as possible of his foot.  Patient was taken back to the OR on 5/23 with further I&D and right partial fourth ray resection and further resection of fifth metatarsal stump.  Both wounds were noted to be hard, viable with bleeding at surgery.  No purulence were encountered.  With absence of residual osteomyelitis, patient does not need long-term IV antibiotic.  Patient now has VAC in his foot wound.  - Continue IV Zosyn   - Treat x 7-day postop, through 5/30  - At discharge, transition to PO Amoxicillin 500 mg every 8 hours (for Enterococcus) plus PO Levofloxacin 750 mg daily (for Pseudomonas and Serratia)  -Wound care/VAC per podiatry

## 2025-05-28 NOTE — DISCHARGE SUMMARY
Discharge Summary - Hospitalist   Name: Gold Van 79 y.o. male I MRN: [id number]  Unit/Bed#: [place] [address] -01 I Date of Admission: 5/11/2025   Date of Service: 5/28/2025 I Hospital Day: 17    Assessment & Plan  Sepsis without acute organ dysfunction (HCC)  History of diabetes mellitus paroxysmal atrial fibrillation asthma cardiomyopathy and PAD presented to [facility] with right foot wound  Met sepsis criteria at [facility]  Blood cultures no growth   Transferred here for vascular surgery  Now status post right fifth ray amputation 5/16/2025  Status post fourth ray resection 5/20/2025  Intraoperative cultures Serratia/Enterococcus with Pseudomonas  Appreciate ID evaluation.  Currently on pip-tazo.  Discharging with amoxicillin (for enterococcus) and levofloxacin (for serratia and pseudomonas) end date 5/30/2025.  7-day postoperative course  Severe peripheral arterial disease (HCC)  Transferred here for vascular surgery intervention  Underwent right CFA SFA endarterectomy/stenting 5/13/2025  Aspirin discontinued.  Started on clopidogrel for stents.  Continue statin  Follow-up with vascular scheduled  Osteomyelitis (HCC)  Right fifth metatarsal ulceration with underlying osteomyelitis and surrounding right foot cellulitis  5/16: Underwent right partial fifth ray resection  5/20 right partial fourth ray amputation, wound debridement  Intraoperative cultures with Serratia/Enterococcus, and Pseudomonas  Antibiotics as outlined above  Wound VAC and needs close follow-up with podiatry  Troponin I above reference range  Patient developed acute onset of dyspnea evening of 5/18 after blood transfusion  EKG with transient lateral ST depression, and elevated trops  Seen by cardiology.  Elevated troponin secondary to volume overload  Continue clopidogrel and statin    Lab Results   Component Value Date    HSTNI0 100 (H) 05/23/2025    HSTNI2 91 (H) 05/23/2025    HSTNI4 92 (H) 05/23/2025    HSTNI 159 (H) 05/21/2025  "    Chronic heart failure with preserved ejection fraction (HFpEF) (Formerly [facility])  Last known LVEF 50% echocardiogram 2024  Briefly volume overloaded due to blood transfusion  Postoperative anemia  Baseline hemoglobin appears to range 10-12  Transfused total 2 units PRBC    Results from last 7 days   Lab Units 05/27/25  0421 05/26/25  0501 05/25/25  0540   HEMOGLOBIN g/dL 8.0* 6.9* 8.1*   MCV fL 93 93 92     Type 2 diabetes mellitus with complication, without long-term current use of insulin (Formerly [facility])  Lab Results   Component Value Date    HGBA1C 9.3 (H) 04/09/2025     Recent Labs     05/27/25  1638 05/27/25  2043 05/28/25  0625 05/28/25  0641   POCGLU 281* 184* 79 82     Prior to admission meds: glipizide, linagliptin and metformin  A1c 9.3.  Endocrinology consulted by vascular surgery  Glucose currently stable continue glipizide 10 mg twice daily   PAF (paroxysmal atrial fibrillation) (Formerly [facility])  Follows with [facility].  Continue atenolol  Anticoagulation: Continue apixaban  Essential hypertension  Continue atenolol 25 mg daily  Due to low blood pressures lisinopril decreased to 5 mg daily and holding amlodipine  Mild intermittent asthma without complication  No exacerbation; prior to admission on Breztri.  Continue equivalent  Pruritus  Chronic pruritus follows with dermatology as an outpatient on prednisone 5 mg daily  Lung nodule  Chest x-ray with incidental finding of \"nodular opacity at left base\"  Outpatient chest x-ray in 6 weeks      Medical Problems       Resolved Problems  Date Reviewed: 5/27/2025   None        Discharging Physician / Practitioner: Bean Sweeney DO  PCP: Shayne Diaz MD  Admission Date:   Admission Orders (From admission, onward)       Ordered        05/11/25 2252  INPATIENT ADMISSION  Once                        Discharge Date: 05/28/25    Medication Changes for Discharge & Rationale:   Amlodipine stopped.  Lisinopril decreased to 5 mg  Amoxicillin/levofloxacin end date 5/30/2025.  Stop aspirin.  Started " clopidogrel.  See after visit summary for reconciled discharge medications provided to patient and/or family.     Consultations During Hospital Stay:  IP CONSULT TO VASCULAR SURGERY  IP CONSULT TO PODIATRY  IP CONSULT TO ENDOCRINOLOGY  IP CONSULT TO SURGICAL CRITICAL CARE  IP CONSULT TO CARDIOLOGY  IP CONSULT TO INFECTIOUS DISEASES  IP CONSULT FOR DISCHARGE SUPPORT  IP CONSULT FOR DISCHARGE SUPPORT     Procedures Performed:   5/13/2025: R CFA/SFA endarterectomy w/ bovine pericardial patch, DCB and stenting of SFA, and angio of AT   5/16/2025: Right fifth ray amputation  5/20/2025: Fourth ray resection    Images:   XR foot 3+ vw right  Result Date: 5/16/2025  Impression: Postop right foot. No acute osseous abnormality. Workstation performed: [id number]       Lab Results: I have reviewed the following results:  Results from last 7 days   Lab Units 05/27/25 0421 05/26/25  0501 05/25/25  0540 05/24/25  0459 05/23/25  1602 05/23/25  0503 05/22/25  0529   WBC Thousand/uL 7.93 8.48 9.56 10.47*  --  7.13 7.61   HEMOGLOBIN g/dL 8.0* 6.9* 8.1* 8.5* 9.7* 8.8* 9.0*   HEMATOCRIT % 25.0* 22.1* 25.4* 26.7* 29.8* 27.7* 27.9*   MCV fL 93 93 92 94  --  91 92   PLATELETS Thousands/uL 496* 526* 541* 588*  --  570* 568*     Results from last 7 days   Lab Units 05/27/25 0421 05/26/25  0501 05/25/25  0540 05/24/25  0459 05/23/25  0503 05/22/25  0529 05/21/25 2018   SODIUM mmol/L 136 135 135 133* 134* 135 132*   POTASSIUM mmol/L 4.0 4.5 4.6 4.3 4.2 3.9 4.2   CHLORIDE mmol/L 104 105 105 103 104 102 100   CO2 mmol/L 25 25 25 24 24 26 24   BUN mg/dL 11 10 9 10 11 14 18   CREATININE mg/dL 0.97 0.91 0.91 0.88 0.94 0.89 1.06   CALCIUM mg/dL 8.3* 8.4 8.3* 8.2* 7.8* 8.0* 7.7*   ALBUMIN g/dL 2.9*  --   --   --   --   --   --    TOTAL BILIRUBIN mg/dL 0.42  --   --   --   --   --   --    ALK PHOS U/L 51  --   --   --   --   --   --    ALT U/L 8  --   --   --   --   --   --    AST U/L 12*  --   --   --   --   --   --    EGFR ml/min/1.73sq m 73 79  79 81 76 81 66   GLUCOSE RANDOM mg/dL 143* 170* 155* 227* 182* 217* 325*         Results from last 7 days   Lab Units 05/23/25 2002 05/23/25  1828 05/23/25  1602   HS TNI 0HR ng/L  --   --  100*   HS TNI 2HR ng/L  --  91*  --    HS TNI 4HR ng/L 92*  --   --               Results from last 7 days   Lab Units 05/28/25  0641 05/28/25  0625 05/27/25 2043 05/27/25  1638 05/27/25  1115 05/27/25  0612 05/26/25 2015 05/26/25  1618 05/26/25  1115 05/26/25  0741   POC GLUCOSE mg/dl 82 79 184* 281* 209* 132 285* 293* 231* 156*             Incidental Findings:   CXR: Nodular opacity at the left base, not visible on prior studies. Recommend follow-up with a chest radiograph with dual energy subtraction in 6 weeks to assure resolution.      Test Results Pending at Discharge (will require follow up):   Pending Labs       Order Current Status    Tissue Exam In process           Reason for Admission:   Transfer from [facility] for vascular surgery evaluation    Hospital Course:   Gold Van is a 79 y.o. male patient who originally presented to [facility] 5/7/2025 for sepsis foot infection.  On 5/11/2025 he was transferred here to [facility] due to need of vascular bypass.  This went well and he underwent subsequent foot surgeries as outlined.  He was followed by infectious disease.  Podiatry recommends continuing wound VAC when patient arrives at facility.  He will need close follow-up with vascular and podiatry postdischarge.  Due to his 17-day hospitalization for full details please refer to medical record    Please see above list of diagnoses and related plan for additional information.     Condition at Discharge: stable     Discharge Day Visit / Exam:   Subjective: Patient seen and examined.  No complaints.    Vitals: Blood Pressure: 118/60 (05/27/25 2211)  Pulse: 75 (05/27/25 2211)  Temperature: 97.8 °F (36.6 °C) (05/27/25 2211)  Temp Source: Oral (05/27/25 2211)  Respirations: 18 (05/27/25 2211)  Height: 6'  (182.9 cm) (05/19/25 1450)  Weight - Scale: 83.6 kg (184 lb 4.9 oz) (05/28/25 0537)  SpO2: 99 % (05/27/25 2211)    Exam:   Physical Exam  Vitals reviewed.   Constitutional:       General: He is not in acute distress.  HENT:      Head: Atraumatic.     Eyes:      General: No scleral icterus.      Cardiovascular:      Rate and Rhythm: Regular rhythm.      Heart sounds:      No gallop.   Pulmonary:      Effort: Pulmonary effort is normal. No respiratory distress.   Abdominal:      General: Bowel sounds are normal.      Palpations: Abdomen is soft.      Tenderness: There is no abdominal tenderness. There is no guarding.     Musculoskeletal:         General: Tenderness and deformity present.      Comments: Right foot wrapped     Skin:     General: Skin is warm.      Coloration: Skin is not jaundiced.     Neurological:      General: No focal deficit present.      Mental Status: He is alert.      Motor: No weakness.     Psychiatric:         Mood and Affect: Mood normal.       Discussion with Family: spouse on telephone    Discharge instructions/Information to patient and family:   See after visit summary for information provided to patient and family.      Provisions for Follow-Up Care:  See after visit summary for information related to follow-up care and any pertinent home health orders.      Mobility at time of Discharge:  Basic Mobility Inpatient Raw Score: 12  JH-HLM Goal: 4: Move to chair/commode  JH-HLM Achieved: 4: Move to chair/commode  JH-HLM Goal achieved. Continue to encourage appropriate mobility.    Disposition:   [facility] (see below)    Planned Readmission: No    Administrative Statements   I spent 35 minutes discharging the patient. This time was spent on the day of discharge. I had direct contact with the patient on the day of discharge. Greater than 50% of the total time was spent examining patient, answering all patient questions, arranging and discussing plan of care  with patient as well as directly providing post-discharge instructions.  Additional time then spent on discharge activities.    **Please Note: This note may have been constructed using a voice recognition system**

## 2025-05-28 NOTE — ASSESSMENT & PLAN NOTE
Admit on initial presentation to [facility] with tachycardia and leukocytosis, secondary to diabetic right foot infection.  Blood cultures negative.  As below patient is with ongoing surgical site infection.  He is afebrile and hemodynamically stable.  -Antibiotic plan as below  -Monitor temperature/WBC  -Supportive care per primary team

## 2025-05-28 NOTE — ASSESSMENT & PLAN NOTE
Right fifth metatarsal ulceration with underlying osteomyelitis and surrounding right foot cellulitis  5/16: Underwent right partial fifth ray resection  5/20 right partial fourth ray amputation, wound debridement  Intraoperative cultures with Serratia/Enterococcus, and Pseudomonas  Antibiotics as outlined above  Wound VAC and needs close follow-up with podiatry

## 2025-05-28 NOTE — PLAN OF CARE
Problem: PAIN - ADULT  Goal: Verbalizes/displays adequate comfort level or baseline comfort level  Description: Interventions:- Encourage patient to monitor pain and request assistance- Assess pain using appropriate pain scale- Administer analgesics based on type and severity of pain and evaluate response- Implement non-pharmacological measures as appropriate and evaluate response- Consider cultural and social influences on pain and pain management- Notify physician/advanced practitioner if interventions unsuccessful or patient reports new pain  Outcome: Progressing     Problem: DISCHARGE PLANNING  Goal: Discharge to home or other facility with appropriate resources  Description: INTERVENTIONS:- Identify barriers to discharge w/patient and caregiver- Arrange for needed discharge resources and transportation as appropriate- Identify discharge learning needs (meds, wound care, etc.)- Arrange for interpretive services to assist at discharge as needed- Refer to Case Management Department for coordinating discharge planning if the patient needs post-hospital services based on physician/advanced practitioner order or complex needs related to functional status, cognitive ability, or social support system  Outcome: Progressing     Problem: SKIN/TISSUE INTEGRITY - ADULT  Goal: Skin Integrity remains intact(Skin Breakdown Prevention)  Description: Assess:-Perform Sae assessment -Clean and moisturize skin -Inspect skin when repositioning, toileting, and assisting with ADLS-Assess under medical devices -Assess extremities for adequate circulation and sensation Bed Management:-Have minimal linens on bed & keep smooth, unwrinkled-Change linens as needed when moist or perspiring-Avoid sitting or lying in one position for more than 2 hours while in bed-Keep HOB at 30 degrees Toileting:-Offer bedside commode-Assess for incontinence -Use incontinent care products after each incontinent episode Activity:-Mobilize patient 3 times  a day-Encourage activity and walks on unit-Encourage or provide ROM exercises -Turn and reposition patient every 2 Hours-Use appropriate equipment to lift or move patient in bed-Instruct/ Assist with weight shifting  when out of bed in chair-Consider limitation of chair time 2 hour intervalsSkin Care:-Avoid use of baby powder, tape, friction and shearing, hot water or constrictive clothing-Relieve pressure over bony prominences Do not massage red bony areasNext Steps:-Teach patient strategies to minimize risks Consider consults to  interdisciplinary teams   Outcome: Progressing  Goal: Incision(s), wounds(s) or drain site(s) healing without S/S of infection  Description: INTERVENTIONS- Assess and document dressing, incision, wound bed, drain sites and surrounding tissue- Provide patient and family education- Perform skin care/dressing changes every shift  Outcome: Progressing  Goal: Pressure injury heals and does not worsen  Description: Interventions:- Implement low air loss mattress or specialty surface (Criteria met)- Apply silicone foam dressing- Instruct/assist with weight shifting every 120 minutes when in chair - Limit chair time to 2 hour intervals- Use special pressure reducing interventions such as turning when in chair - Apply fecal or urinary incontinence containment device - Perform passive or active ROM every 2 hours- Turn and reposition patient & offload bony prominences every 2 hours - Utilize friction reducing device or surface for transfers - Consider consults to  interdisciplinary teams such as wound- Use incontinent care products after each incontinent episode such as wipes- Consider nutrition services referral as needed  Outcome: Progressing     Problem: Nutrition/Hydration-ADULT  Goal: Nutrient/Hydration intake appropriate for improving, restoring or maintaining nutritional needs  Description: Monitor and assess patient's nutrition/hydration status for malnutrition. Collaborate with  interdisciplinary team and initiate plan and interventions as ordered.  Monitor patient's weight and dietary intake as ordered or per policy. Utilize nutrition screening tool and intervene as necessary. Determine patient's food preferences and provide high-protein, high-caloric foods as appropriate. INTERVENTIONS:- Monitor oral intake, urinary output, labs, and treatment plans- Assess nutrition and hydration status and recommend course of action- Evaluate amount of meals eaten- Assist patient with eating if necessary - Allow adequate time for meals- Recommend/ encourage appropriate diets, oral nutritional supplements, and vitamin/mineral supplements- Order, calculate, and assess calorie counts as needed- Recommend, monitor, and adjust tube feedings and TPN/PPN based on assessed needs- Assess need for intravenous fluids- Provide specific nutrition/hydration education as appropriate- Include patient/family/caregiver in decisions related to nutrition  Outcome: Progressing     Problem: Prexisting or High Potential for Compromised Skin Integrity  Goal: Skin integrity is maintained or improved  Description: INTERVENTIONS:  - Identify patients at risk for skin breakdown  - Assess and monitor skin integrity  - Assess and monitor nutrition and hydration status  - Monitor labs   - Assess for incontinence   - Turn and reposition patient  - Assist with mobility/ambulation  - Relieve pressure over bony prominences  - Avoid friction and shearing  - Provide appropriate hygiene as needed including keeping skin clean and dry  - Evaluate need for skin moisturizer/barrier cream  - Collaborate with interdisciplinary team   - Patient/family teaching  - Consider wound care consult   Outcome: Progressing     Problem: INFECTION - ADULT  Goal: Absence or prevention of progression during hospitalization  Description: INTERVENTIONS:  - Assess and monitor for signs and symptoms of infection  - Monitor lab/diagnostic results  - Monitor all  insertion sites, i.e. indwelling lines, tubes, and drains  - Monitor endotracheal if appropriate and nasal secretions for changes in amount and color  - [place] appropriate cooling/warming therapies per order  - Administer medications as ordered  - Instruct and encourage patient and family to use good hand hygiene technique  - Identify and instruct in appropriate isolation precautions for identified infection/condition  Outcome: Progressing  Goal: Absence of fever/infection during neutropenic period  Description: INTERVENTIONS:  - Monitor WBC    Outcome: Progressing     Problem: SAFETY ADULT  Goal: Patient will remain free of falls  Description: INTERVENTIONS:  - Educate patient/family on patient safety including physical limitations  - Instruct patient to call for assistance with activity   - Consult OT/PT to assist with strengthening/mobility   - Keep Call bell within reach  - Keep bed low and locked with side rails adjusted as appropriate  - Keep care items and personal belongings within reach  - Initiate and maintain comfort rounds  - Make Fall Risk Sign visible to staff  - Offer Toileting every 2 Hours, in advance of need  - Initiate/Maintain bed alarm  - Obtain necessary fall risk management equipment:   - Apply yellow socks and bracelet for high fall risk patients  - Consider moving patient to room near nurses station  Outcome: Progressing  Goal: Maintain or return to baseline ADL function  Description: INTERVENTIONS:  -  Assess patient's ability to carry out ADLs; assess patient's baseline for ADL function and identify physical deficits which impact ability to perform ADLs (bathing, care of mouth/teeth, toileting, grooming, dressing, etc.)  - Assess/evaluate cause of self-care deficits   - Assess range of motion  - Assess patient's mobility; develop plan if impaired  - Assess patient's need for assistive devices and provide as appropriate  - Encourage maximum independence but intervene and supervise when  necessary  - Involve family in performance of ADLs  - Assess for home care needs following discharge   - Consider OT consult to assist with ADL evaluation and planning for discharge  - Provide patient education as appropriate  Outcome: Progressing  Goal: Maintains/Returns to pre admission functional level  Description: INTERVENTIONS:  - Perform AM-PAC 6 Click Basic Mobility/ Daily Activity assessment daily.  - Set and communicate daily mobility goal to care team and patient/family/caregiver.   - Collaborate with rehabilitation services on mobility goals if consulted  - Perform Range of Motion 4 times a day.  - Reposition patient every 3 hours.  - Dangle patient 3 times a day  - Stand patient 3 times a day  - Ambulate patient 3 times a day  - Out of bed to chair 3 times a day   - Out of bed for meals 3 times a day  - Out of bed for toileting  - Record patient progress and toleration of activity level   Outcome: Progressing     Problem: Knowledge Deficit  Goal: Patient/family/caregiver demonstrates understanding of disease process, treatment plan, medications, and discharge instructions  Description: Complete learning assessment and assess knowledge base.  Interventions:  - Provide teaching at level of understanding  - Provide teaching via preferred learning methods  Outcome: Progressing     Problem: METABOLIC, FLUID AND ELECTROLYTES - ADULT  Goal: Electrolytes maintained within normal limits  Description: INTERVENTIONS:  - Monitor labs and assess patient for signs and symptoms of electrolyte imbalances  - Administer electrolyte replacement as ordered  - Monitor response to electrolyte replacements, including repeat lab results as appropriate  - Instruct patient on fluid and nutrition as appropriate  Outcome: Progressing     Problem: HEMATOLOGIC - ADULT  Goal: Maintains hematologic stability  Description: INTERVENTIONS  - Assess for signs and symptoms of bleeding or hemorrhage  - Monitor labs  - Administer supportive  blood products/factors as ordered and appropriate  Outcome: Progressing     Problem: CARDIOVASCULAR - ADULT  Goal: Maintains optimal cardiac output and hemodynamic stability  Description: INTERVENTIONS:  - Monitor I/O, vital signs and rhythm  - Monitor for S/S and trends of decreased cardiac output  - Administer and titrate ordered vasoactive medications to optimize hemodynamic stability  - Assess quality of pulses, skin color and temperature  - Assess for signs of decreased coronary artery perfusion  - Instruct patient to report change in severity of symptoms  Outcome: Progressing  Goal: Absence of cardiac dysrhythmias or at baseline rhythm  Description: INTERVENTIONS:  - Continuous cardiac monitoring, vital signs, obtain 12 lead EKG if ordered  - Administer antiarrhythmic and heart rate control medications as ordered  - Monitor electrolytes and administer replacement therapy as ordered  Outcome: Progressing     Problem: MOBILITY - ADULT  Goal: Maintain or return to baseline ADL function  Description: INTERVENTIONS:  -  Assess patient's ability to carry out ADLs; assess patient's baseline for ADL function and identify physical deficits which impact ability to perform ADLs (bathing, care of mouth/teeth, toileting, grooming, dressing, etc.)  - Assess/evaluate cause of self-care deficits   - Assess range of motion  - Assess patient's mobility; develop plan if impaired  - Assess patient's need for assistive devices and provide as appropriate  - Encourage maximum independence but intervene and supervise when necessary  - Involve family in performance of ADLs  - Assess for home care needs following discharge   - Consider OT consult to assist with ADL evaluation and planning for discharge  - Provide patient education as appropriate  Outcome: Progressing  Goal: Maintains/Returns to pre admission functional level  Description: INTERVENTIONS:  - Perform AM-PAC 6 Click Basic Mobility/ Daily Activity assessment daily.  - Set  and communicate daily mobility goal to care team and patient/family/caregiver.   - Collaborate with rehabilitation services on mobility goals if consulted  - Perform Range of Motion 4 times a day.  - Reposition patient every 3 hours.  - Dangle patient 3 times a day  - Stand patient 3 times a day  - Ambulate patient 3 times a day  - Out of bed to chair 3 times a day   - Out of bed for meals 3 times a day  - Out of bed for toileting  - Record patient progress and toleration of activity level   Outcome: Progressing

## 2025-05-29 NOTE — RESTORATIVE TECHNICIAN NOTE
Restorative Technician Note      Patient Name: Gold Van     Restorative Tech Visit Date: 05/29/25  Note Type: Mobility  Patient Position Upon Consult: Supine  Activity Performed: Transferred; Stood  Assistive Device: Roller walker  Patient Position at End of Consult: All needs within reach; Bedside chair

## 2025-05-29 NOTE — UTILIZATION REVIEW
NOTIFICATION OF ADMISSION DISCHARGE   This is a Notification of Discharge from [facility]. Please be advised that this patient has been discharge from our facility. Below you will find the admission and discharge date and time including the patient’s disposition.   UTILIZATION REVIEW CONTACT:  Utilization Review Assistants  Network Utilization Review Department  Phone: [phone] x carefully listen to the prompts. All voicemails are confidential.  Email: [email or web address]     ADMISSION INFORMATION  PRESENTATION DATE: 5/11/2025 10:39 PM  OBERVATION ADMISSION DATE: N/A  INPATIENT ADMISSION DATE: 5/11/25 10:39 PM   DISCHARGE DATE: 5/28/2025 12:46 PM   DISPOSITION:Non [facility] SNF/TCU/SNU    Network Utilization Review Department  ATTENTION: Please call with any questions or concerns to [phone] and carefully listen to the prompts so that you are directed to the right person. All voicemails are confidential.   For Discharge needs, contact Care Management DC Support Team at [phone] opt. 2  Send all requests for admission clinical reviews, approved or denied determinations and any other requests to dedicated fax number below belonging to the campus where the patient is receiving treatment. List of dedicated fax numbers for the Facilities:  FACILITY NAME UR FAX NUMBER   ADMISSION DENIALS (Administrative/Medical Necessity) [phone]   DISCHARGE SUPPORT TEAM ([facility]) [phone]   PARENT CHILD HEALTH (Maternity/NICU/Pediatrics) [phone]   [facility] [phone]   [facility] [phone]   [facility] [phone]   [facility] [phone]   [facility] [phone]   [facility] [phone]   [facility] [phone]   [facility] [phone]   [facility]  [facility] [phone]   [facility] [phone]   [facility] [phone]   [facility] [phone]

## 2025-05-30 ENCOUNTER — TELEPHONE (OUTPATIENT)
Dept: VASCULAR SURGERY | Facility: CLINIC | Age: 80
End: 2025-05-30

## 2025-05-30 NOTE — TELEPHONE ENCOUNTER
Vascular Nurse Navigator Post Op Call    Procedure: Extended right common femoral endarterectomy onto the proximal SFA with bovine pericardial patch   -Right lower extremity angiogram   -Third order cannulation of the right anterior tibial artery   -Balloon angioplasty of the right anterior tibial with a 3x220 Fernando balloon   -DCB angioplasty of the SFA with a 6x150 [place] balloon   -Stenting of the right SFA with two 6x150 Grazyna stents. 6x5cm Viabahn. and a 7x5cm viabahn   -Post-dilation of stents with a 6mm and 7mm balloon   -Application of Prevena negative pressure wound vac    Date of Procedure: 5/13/25    Surgeon:   * Richard Aiken MD - Primary     * Elie Narayan MD - Assisting     * Karen Salter    Discharge Date: 5/28/25    Discharge Disposition: Home    Leg Weakness?: No    Leg Swelling?: No    Leg Numbness?: No    Chest Pain?: No    Shortness of Breath?: No    Orthopnea?: No    Anticoagulation pt was discharged on post op?: Apixaban (Eliquis) and Clopidogrel (Plavix)    Statin pt was discharged on post op?:  Lipitor (atorvastatin)    Bleeding?: No    Uncontrolled Pain?: No    Incision Concerns?: No    Fever or Chills?: No      Reviewed discharge instructions and incision care with patient.      NEXT OFFICE VISIT SCHEDULED:  6/16/25 at 9 am with BELGICA Jesus at [facility]    Transportation Confirmed?: Yes      Any further questions/concerns?  Spoke with Janell, nurse at [facility], in regards to above.  She stated that patient's incision looks good and without any signs of infection - no redness, drainage, or open areas.  Reviewed discharge medications - Eliquis and Plavix.  All questions answered.  No concerns expressed at this time.

## 2025-06-03 ENCOUNTER — TELEPHONE (OUTPATIENT)
Age: 80
End: 2025-06-03

## 2025-06-03 NOTE — TELEPHONE ENCOUNTER
Caller: Janet / [facility]     Doctor: Dr. Galeas    Reason for call: Janet called to inform us that the patient Scooter Van is refusing his wound vac. Thank you    Call back#: [phone]

## 2025-06-10 ENCOUNTER — OFFICE VISIT (OUTPATIENT)
Facility: HOSPITAL | Age: 80
End: 2025-06-10
Attending: PODIATRIST
Payer: COMMERCIAL

## 2025-06-10 VITALS
HEIGHT: 72 IN | DIASTOLIC BLOOD PRESSURE: 59 MMHG | TEMPERATURE: 97.9 F | SYSTOLIC BLOOD PRESSURE: 126 MMHG | HEART RATE: 113 BPM | WEIGHT: 148 LBS | BODY MASS INDEX: 20.05 KG/M2

## 2025-06-10 DIAGNOSIS — E11.621 DIABETIC ULCER OF RIGHT MIDFOOT ASSOCIATED WITH TYPE 2 DIABETES MELLITUS, WITH FAT LAYER EXPOSED (HCC): Primary | ICD-10-CM

## 2025-06-10 DIAGNOSIS — L97.412 DIABETIC ULCER OF RIGHT MIDFOOT ASSOCIATED WITH TYPE 2 DIABETES MELLITUS, WITH FAT LAYER EXPOSED (HCC): Primary | ICD-10-CM

## 2025-06-10 DIAGNOSIS — I73.9 SEVERE PERIPHERAL ARTERIAL DISEASE (HCC): ICD-10-CM

## 2025-06-10 PROCEDURE — 99213 OFFICE O/P EST LOW 20 MIN: CPT | Performed by: PODIATRIST

## 2025-06-10 PROCEDURE — 99024 POSTOP FOLLOW-UP VISIT: CPT | Performed by: PODIATRIST

## 2025-06-10 RX ORDER — LIDOCAINE 40 MG/G
CREAM TOPICAL ONCE
Status: COMPLETED | OUTPATIENT
Start: 2025-06-10 | End: 2025-06-10

## 2025-06-10 RX ADMIN — LIDOCAINE 1 APPLICATION: 40 CREAM TOPICAL at 11:05

## 2025-06-10 NOTE — PROGRESS NOTES
Wound Procedure Treatment Right;Lateral Foot    Performed by: Denae Mcgarry RN  Authorized by: Agustin Galeas DPM  Associated wounds:   Wound 05/16/25 Diabetic Ulcer Foot Right;Lateral    Wound cleansed with:  NSS   Applied primary dressing:  Acticoat   Applied secondary dressing:  Gauze and ABD   Dressing secured with:  Kerlix and Tape

## 2025-06-10 NOTE — PROGRESS NOTES
Patient ID: Gold Van is a 79 y.o. male Date of Birth 1945       Chief Complaint   Patient presents with    New Patient Visit     New patient today for recent surgical wound to the right foot.       Allergies:  Ciprofloxacin, Sulfamethoxazole, Trimethoprim, Dexamethasone, Triamcinolone, and Bactrim [sulfamethoxazole-trimethoprim]    Diagnosis:  1. Diabetic ulcer of right midfoot associated with type 2 diabetes mellitus, with fat layer exposed (HCC)  -     Wound cleansing and dressings Right;Lateral Foot; Future  -     lidocaine (LMX) 4 % cream  -     VAS ARTERIAL DUPLEX-LOWER LIMB UNILATERAL; Future; Expected date: 06/10/2025  2. Severe peripheral arterial disease (HCC)  -     lidocaine (LMX) 4 % cream     Diagnosis ICD-10-CM Associated Orders   1. Diabetic ulcer of right midfoot associated with type 2 diabetes mellitus, with fat layer exposed (HCC)  E11.621 Wound cleansing and dressings Right;Lateral Foot    L97.412 lidocaine (LMX) 4 % cream     VAS ARTERIAL DUPLEX-LOWER LIMB UNILATERAL      2. Severe peripheral arterial disease (HCC)  I73.9 lidocaine (LMX) 4 % cream           Assessment & Plan:  See wound orders.   - Today we spent the entirety of the discussing options for limb salvage at length  - We did discuss potential TMA with rotational flap of the right foot versus serial debridements, hyperbaric oxygen therapy, some skin substitutes and eventual skin grafting.  - We discussed the different timelines of these treatment courses, and he is to come to consider the options over the next week  - Will obtain postintervention leads  - I would recommend to continue strict nonweightbearing I reviewed the chart at length, vascular's note, podiatry notes down in [place]  - I also counseled the patient and his wife extensively on the diagnosis prognosis of course and the risks and benefits of these course of limb salvage and we also did discuss the course of treatment with wound care specialist Eliza and  staff for consideration of hyperbaric oxygen therapy.    Subjective:   Presents for evaluation and management of his right foot, he is very well-known to me from chronic care.  He recently had revascularization and a partial right 4, 5 amputation.  Had issues with wound VAC currently on Plavix and Eliquis.  In nursing home strict nonweightbearing to the foot        The following portions of the patient's history were reviewed and updated as appropriate:   Problem List[1]  Past Medical History[2]  Past Surgical History[3]  Social History[4]   Current Medications[5]  Family History[6]   Review of Systems   Constitutional:  Negative for chills and fever.   HENT:  Negative for ear pain and sore throat.    Eyes:  Negative for pain and visual disturbance.   Respiratory:  Negative for cough and shortness of breath.    Cardiovascular:  Negative for chest pain and palpitations.   Gastrointestinal:  Negative for abdominal pain and vomiting.   Genitourinary:  Negative for dysuria and hematuria.   Musculoskeletal:  Negative for arthralgias and back pain.   Skin:  Negative for color change and rash.   Neurological:  Negative for seizures and syncope.   All other systems reviewed and are negative.        Objective:  /59   Pulse (!) 113   Temp 97.9 °F (36.6 °C)   Ht 6' (1.829 m)   Wt 67.1 kg (148 lb)   BMI 20.07 kg/m²     Physical Exam    Skin:     Comments: Granular base, bone exposed, 4 and 5, no signs of infection, good granular base with some fibrotic and necrotic tissue central wound very large deficit             Wound 04/08/25 Diabetic Ulcer Heel Right (Active)       Wound 05/13/25 Surgical Closed Surgical Incision Thigh Anterior;Proximal;Right (Active)       Wound 05/15/25 Irritant Contact Dermatitis Perspiration Sacrum Mid (Active)       Wound 05/16/25 Surgical Foot Right;Lateral (Active)   Wound Image   06/10/25 1012   Wound Description Granulation tissue;Eschar;Yellow;Slough;Pink;Exposed bone 06/10/25 1013  "  Non-staged Wound Description Full thickness 06/10/25 1013   Wound Length (cm) 8.4 cm 06/10/25 1013   Wound Width (cm) 2.5 cm 06/10/25 1013   Wound Depth (cm) 1.6 cm 06/10/25 1013   Wound Surface Area (cm^2) 16.49 cm^2 06/10/25 1013   Wound Volume (cm^3) 17.593 cm^3 06/10/25 1013   Calculated Wound Volume (cm^3) 33.6 cm^3 06/10/25 1013   Drainage Amount Large 06/10/25 1013   Drainage Description Serosanguineous;Green;Tan 06/10/25 1013   Miranda-wound Assessment Pink;Scar Tissue;Dry 06/10/25 1013   Treatments Irrigation with NSS 06/10/25 1013   Wound packed? Yes 06/10/25 1013   Packing- # removed 2 06/10/25 1013   Dressing Status Old drainage;Leaking (Comment);New drainage 06/10/25 1013                         Procedures     Results from last 6 Months   Lab Units 05/16/25  1337   WOUND CULTURE  3+ Growth of Serratia marcescens*  3+ Growth of Enterococcus faecalis*  1+ Growth of Pseudomonas aeruginosa*         Wound Instructions:  Orders Placed This Encounter   Procedures    Wound cleansing and dressings Right;Lateral Foot     Right Lateral Foot ulcer:    Wash your hands with soap and water.  Remove old dressing, discard into plastic bag and place in trash.    Cleanse the wound with NSS or wound cleanser prior to applying a clean dressing. Do not use tissue or cotton balls.   Do not scrub the wound. Pat dry using gauze.  Shower no ---Do not get the wound wet in the shower    Apply Acticoat Flex 3 to the open area,   Cover with gauze/ABD Pad  Secure with kerlix and tape  Change dressing every other day and as needed for excessive drainage/ leakage    Non weight bearing to the right foot    Follow up in 1 week at the Wound Center     Standing Status:   Future     Expiration Date:   6/17/2025         Agustin Galeas DPM      Portions of the record may have been created with voice recognition software. Occasional wrong word or \"sound a like\" substitutions may have occurred due to the inherent limitations of " voice recognition software. Read the chart carefully and recognize, using context, where substitutions have occurred.           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[facility])    Shortness of breath    COVID-19    Acute respiratory insufficiency    Dysmetria    CVA (cerebrovascular accident) (HCC)    Abnormal echocardiogram    Gastroesophageal reflux disease without esophagitis    Impaired mobility and activities of daily living    Neuropathy    Foot drop, left    Moderate protein-calorie malnutrition (HCC)    Severe peripheral arterial disease (HCC)    Ulcer of right foot with fat layer exposed secondary to osteomyelitis    PAF (paroxysmal atrial fibrillation) (HCC)    Chronic heart failure with preserved ejection fraction (HFpEF) (HCC)    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)    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 (HCC)    Sepsis without acute organ dysfunction (HCC)    Pulmonary hypertension (HCC)    Postoperative anemia    Osteomyelitis (HCC)    Troponin I above reference range    Volume overload   [2]   Past Medical History:  Diagnosis Date    Asthma     COVID-19     CVA (cerebral vascular accident) (HCC)     Diabetes mellitus (HCC)     Environmental allergies     Hyperlipidemia     Hypertension     Macular degeneration 07/13/2020    right eye    Orthostatic hypotension     Osteopenia     Pneumonia     Pneumothorax     Sinusitis    [3]   Past Surgical History:  Procedure  Laterality Date    CARPAL TUNNEL RELEASE Left 08/2024    CATARACT EXTRACTION Left 07/2024    COLONOSCOPY      KNEE CARTILAGE SURGERY Right 1964    LA AMPUTATION METATARSAL W/TOE SINGLE Right 5/16/2025    Procedure: RAY RESECTION FOOT - R partial 5th ray resection;  Surgeon: Reinaldo Brewer DPM;  Location: [facility];  Service: Podiatry    LA AMPUTATION METATARSAL W/TOE SINGLE Right 5/23/2025    Procedure: Right partial 4th ray amputation, wound debridement;  Surgeon: Brandon Phillips DPM;  Location: [facility];  Service: Podiatry    LA TEAEC W/WO PATCH GRAFT COMMON FEMORAL Right 5/13/2025    Procedure: Right common femoral endarterectomy with bovine pericardial patch Right lower extremity angiogram Third order cannulation of the right anterior tibial artery Balloon angioplasty of the right anterior tibial with a 3x220 Fernando balloon DCB angioplasty of the SFA with a 6x150 [place] balloon Stenting of the right SFA with two 6x150 Grazyna stents, 6x5cm Viabahn, and a 7x5cm viabahn Post-di    VASECTOMY      WISDOM TOOTH EXTRACTION      x4   [4]   Social History  Socioeconomic History    Marital status: /Civil Union   Tobacco Use    Smoking status: Former    Smokeless tobacco: Never    Tobacco comments:     quit about 25 years ago   Vaping Use    Vaping status: Never Used   Substance and Sexual Activity    Alcohol use: Never    Drug use: Never    Sexual activity: Yes     Partners: Female   Social History Narrative    Daily caffeine use- 1 cup of tea, 2 cups of coffee     Social Drivers of Health     Food Insecurity: No Food Insecurity (5/13/2025)    Nursing - Inadequate Food Risk Classification     Worried About Running Out of Food in the Last Year: Never true     Ran Out of Food in the Last Year: Never true     Ran Out of Food in the Last Year: Never true   Transportation Needs: No Transportation Needs (5/13/2025)    Nursing - Transportation Risk Classification     Lack of Transportation: No    Received from  [facility]    Social Connections   Intimate Partner Violence: Unknown (2025)    Nursing IPS     Physically Hurt by Someone: No     Hurt or Threatened by Someone: No   Housing Stability: Unknown (2025)    Nursing: Inadequate Housing Risk Classification     Unable to Pay for Housing in the Last Year: No     Has Housin   [5]   Current Outpatient Medications:     albuterol (ACCUNEB) 1.25 MG/3ML nebulizer solution, Take 1.25 mg by nebulization every 6 (six) hours as needed for wheezing, Disp: , Rfl:     apixaban (ELIQUIS) 5 mg, 5 mg in the morning and 5 mg in the evening., Disp: , Rfl:     atenolol (TENORMIN) 25 mg tablet, Take 25 mg by mouth in the morning., Disp: , Rfl:     atorvastatin (LIPITOR) 40 mg tablet, Take 1 tablet (40 mg total) by mouth every evening, Disp: 30 tablet, Rfl: 0    Budeson-Glycopyrrol-Formoterol (Breztri Aerosphere) 160-9-4.8 MCG/ACT AERO, Take 2 puffs by mouth in the morning and 2 puffs in the evening., Disp: , Rfl:     clopidogrel (PLAVIX) 75 mg tablet, Take 1 tablet (75 mg total) by mouth daily, Disp: 90 tablet, Rfl: 0    docusate sodium (COLACE) 100 mg capsule, Take 1 capsule (100 mg total) by mouth 2 (two) times a day, Disp: , Rfl:     famotidine (PEPCID) 20 mg tablet, Take 1 tablet (20 mg total) by mouth 2 (two) times a day, Disp: , Rfl:     glipiZIDE (GLUCOTROL XL) 10 mg 24 hr tablet, Take 10 mg by mouth in the morning and 10 mg before bedtime., Disp: , Rfl:     lisinopril (ZESTRIL) 5 mg tablet, Take 1 tablet (5 mg total) by mouth daily, Disp: , Rfl:     metFORMIN (GLUCOPHAGE-XR) 500 mg 24 hr tablet, Take 500 mg by mouth daily with dinner, Disp: , Rfl:     montelukast (SINGULAIR) 10 mg tablet, Take 1 tablet (10 mg total) by mouth daily at bedtime, Disp: , Rfl:     Multiple Vitamins-Minerals (Multivitamin Adults) TABS, Take 1 tablet by mouth in the morning., Disp: , Rfl:     oxyCODONE (ROXICODONE) 10 MG TABS, Take 1 tablet (10 mg total) by mouth every 4 (four) hours as needed  for severe pain ** PAPER SCRIPT FOR SNF USE ONLY ** Max Daily Amount: 60 mg, Disp: 10 tablet, Rfl: 0    polyethylene glycol (MIRALAX) 17 g packet, Take 17 g by mouth daily as needed (constipation), Disp: , Rfl:     predniSONE 5 mg tablet, Take 1 tablet (5 mg total) by mouth in the morning., Disp: , Rfl:     albuterol (PROVENTIL HFA,VENTOLIN HFA) 90 mcg/act inhaler, 1 puff if needed, Disp: , Rfl:     linaGLIPtin (Tradjenta) 5 MG TABS, Take 5 mg by mouth daily with dinner (Patient not taking: Reported on 6/10/2025), Disp: , Rfl:     LORazepam (ATIVAN) 0.5 mg tablet, Take 0.5 mg by mouth if needed (Patient not taking: Reported on 6/10/2025), Disp: , Rfl:     OneTouch Ultra test strip, , Disp: , Rfl:     Current Facility-Administered Medications:     lidocaine (LMX) 4 % cream, , Topical, Once,   [6]   Family History  Problem Relation Name Age of Onset    Asthma Mother      Emphysema Mother      Cancer Father      Asthma Brother      Cancer Brother

## 2025-06-10 NOTE — PATIENT INSTRUCTIONS
Orders Placed This Encounter   Procedures    Wound cleansing and dressings Right;Lateral Foot     Right Lateral Foot ulcer:    Wash your hands with soap and water.  Remove old dressing, discard into plastic bag and place in trash.    Cleanse the wound with NSS or wound cleanser prior to applying a clean dressing. Do not use tissue or cotton balls.   Do not scrub the wound. Pat dry using gauze.  Shower no ---Do not get the wound wet in the shower    Apply Acticoat Flex 3 to the open area,   Cover with gauze/ABD Pad  Secure with kerlix and tape  Change dressing every other day and as needed for excessive drainage/ leakage    Non weight bearing to the right foot    Follow up in 1 week at the Wound Center     Standing Status:   Future     Expiration Date:   6/17/2025      
PHYSICAL EXAM:  Vital Signs Last 24 Hrs  T(C): 36.6 (07-17-20 @ 11:38)  T(F): 97.9 (07-17-20 @ 11:38), Max: 97.9 (07-17-20 @ 11:38)  HR: 84 (07-17-20 @ 11:38) (82 - 100)  BP: 100/68 (07-17-20 @ 11:38)  BP(mean): --  RR: 18 (07-17-20 @ 11:38) (18 - 18)  SpO2: 100% (07-17-20 @ 11:38) (98% - 100%)  Wt(kg): --    Constitutional: NAD, awake and alert  EYES: EOMI  ENT:  Normal Hearing, no tonsillar exudates   Neck: Soft and supple   Respiratory: Breath sounds are clear bilaterally, No wheezing, rales or rhonchi; mildly decreased BS in bases.   Cardiovascular: S1S2 normal, irregular rate and rhythm, mild systolic murmur.   Gastrointestinal: Bowel Sounds present, soft, nontender, nondistended, no guarding, no rebound  Extremities: Trace to 1+ LE edema.   Neurological: A/O x 3, no focal deficits  Skin: No rashes  Psych: No depression or anhedonia  Heme: No bruises, no nose bleeds

## 2025-06-11 ENCOUNTER — TELEPHONE (OUTPATIENT)
Facility: HOSPITAL | Age: 80
End: 2025-06-11

## 2025-06-11 NOTE — TELEPHONE ENCOUNTER
Received a call from Rosa Maria, nursing with [facility] with questions on wound care orders.  Reviewed orders with Rosa Maria.  Reports they are unable to order the Acticoat 3 and would like to know of a dressing they can apply if they should use off the Acticoat they were provided with from the patients appointment yesterday.    Made Dr Galeas aware and may use Silver Alginate in place of the Acticoat if they should use up prior to his next appointment.  Call placed back to Rosa Maria to make aware about the Silver Alginate, verbalized her understanding of same.

## 2025-06-12 NOTE — PROGRESS NOTES
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      [facility].  - 5 weeks post op, first post op visit.  - Presents without lower extremity complaints.  Denies lower extremity pain, or right foot pain.  Patient is strict nonweightbearing and not walking.  - M/S intact  -Doppler right DP and PT signal appreciated  -Right foot dressing intact, follows with podiatry last seen 6/10/2025.  Dressing left in place at office visit today.  Last podiatry note reviewed.  Patient is strict nonweightbearing. Discussion re: TMA awaiting LEAD.    Right groin incision CDI with staples.  No dehiscence, drainage, erythema or signs of infection.  Mild surrounding staple irritation.  No signs of infection.  All staples removed at office visit today    - Per report patient had arterial duplex on  at [place] [facility] facility however no records are available.  Called rehab facility to have them faxed over.  Did not receive by end of office visit.  -Patient has LEAD ordered by podiatry    Plan:  -Postop LEAD with RIC/healing pressures  -Return to office with vascular surgeon in 2 months for postop follow-up  -Continue Eliquis 5 mg twice daily (A-fib)  -Continue Plavix 75 mg daily  -Continue statin, Lipitor 40 mg  -Right foot wound care per podiatry  instruction  -Right groin incision cleanse daily soap and water, pat dry.  Paint with Betadine 3-5 days then may leave open to air.  Continue to monitor for dehiscence, drainage, erythema, or signs of infection.  -Call or return to vascular sooner with new or worsening symptoms, changes to incision, questions or concerns.        Orders:    VAS ARTERIAL DUPLEX- LOWER LIMB BILATERAL; Future        History of Present Illness     Patient is s/p R common femoral endarterectomy on 5/13/25 ([facility]) and presents today for post-op visit.     HPI  Gold Van is a 79 y.o. male who presents for postop follow-up s/p s/p R CFA/SFA endarterectomy w/ bovine pericardial patch, DCB and stenting of SFA, and angio of AT 5/13/25.    Presents for initial postop follow-up  Patient presents without complaints other than sacral pain and pressure.  Patient is nonweightbearing, right foot dressing in place.  Right groin incision intact with staples    Maintained OMT Plavix, Eliquis (A-fib), and statin    See above assessment and plan.      History obtained from: patient    Review of Systems   Constitutional: Negative.    HENT: Negative.     Eyes: Negative.    Respiratory: Negative.     Cardiovascular: Negative.    Gastrointestinal: Negative.    Endocrine: Negative.    Genitourinary: Negative.    Musculoskeletal: Negative.    Skin:  Positive for wound.   Allergic/Immunologic: Negative.    Neurological: Negative.    Hematological: Negative.    Psychiatric/Behavioral: Negative.       Medical History Reviewed by provider this encounter:     .  Past Medical History   Past Medical History[1]  Past Surgical History[2]  Family History[3]   reports that he has quit smoking. He has never used smokeless tobacco. He reports that he does not drink alcohol and does not use drugs.  Current Outpatient Medications   Medication Instructions    albuterol (ACCUNEB) 1.25 mg, Nebulization, Every 6 hours PRN    albuterol (PROVENTIL HFA,VENTOLIN HFA) 90 mcg/act  inhaler 1 puff, As needed    apixaban (ELIQUIS) 5 mg, 2 times daily    atenolol (TENORMIN) 25 mg, Oral, Daily    atorvastatin (LIPITOR) 40 mg, Oral, Every evening    Budeson-Glycopyrrol-Formoterol (Breztri Aerosphere) 160-9-4.8 MCG/ACT AERO 2 puffs, Oral, Every 12 hours    clopidogrel (PLAVIX) 75 mg, Oral, Daily    docusate sodium (COLACE) 100 mg, Oral, 2 times daily    famotidine (PEPCID) 20 mg, Oral, 2 times daily    glipiZIDE (GLUCOTROL XL) 10 mg, Oral, 2 times daily    lisinopril (ZESTRIL) 5 mg, Oral, Daily    LORazepam (ATIVAN) 0.5 mg, As needed    metFORMIN (GLUCOPHAGE-XR) 500 mg, Oral, Daily with dinner    montelukast (SINGULAIR) 10 mg, Oral, Daily at bedtime    Multiple Vitamins-Minerals (Multivitamin Adults) TABS 1 tablet, Oral, Daily    OneTouch Ultra test strip     oxyCODONE (ROXICODONE) 10 mg, Oral, Every 4 hours PRN, ** PAPER SCRIPT FOR SNF USE ONLY **    polyethylene glycol (MIRALAX) 17 g, Oral, Daily PRN    predniSONE 5 mg, Oral, Daily    Tradjenta 5 mg, Daily with dinner   Allergies[4]   Medications Ordered Prior to Encounter[5]   Social History[6]     Objective   /70 (BP Location: Left arm, Patient Position: Sitting)   Ht 6' (1.829 m)   BMI 20.07 kg/m²      Physical Exam  Vitals reviewed.   Constitutional:       General: He is not in acute distress.     Appearance: Normal appearance.   HENT:      Head: Normocephalic and atraumatic.     Cardiovascular:      Rate and Rhythm: Tachycardia present. Rhythm irregular.      Pulses:           Dorsalis pedis pulses are detected w/ Doppler on the right side.        Posterior tibial pulses are detected w/ Doppler on the right side.   Pulmonary:      Effort: Pulmonary effort is normal. No respiratory distress.     Musculoskeletal:         General: Normal range of motion.      Right lower leg: No edema.      Left lower leg: No edema.     Skin:     General: Skin is warm.      Comments: Right groin incision CDI with staples.  Mild surrounding erythema to  staple insertion.  No signs infection.    Right foot dressing intact.     Neurological:      Mental Status: He is alert and oriented to person, place, and time.      Sensory: No sensory deficit.      Motor: No weakness.     Psychiatric:         Behavior: Behavior normal.               Administrative Statements   Discussed: Instructions for management, Patient and family education, Impressions, Documenting in the medical record, Reviewing/placing orders in the medical record (including tests, medications, and/or procedures), Obtaining or reviewing history  , and staple removal/wound care.         [1]   Past Medical History:  Diagnosis Date    Asthma     COVID-19     CVA (cerebral vascular accident) (HCC)     Diabetes mellitus (HCC)     Environmental allergies     Hyperlipidemia     Hypertension     Macular degeneration 07/13/2020    right eye    Orthostatic hypotension     Osteopenia     Pneumonia     Pneumothorax     Sinusitis    [2]   Past Surgical History:  Procedure Laterality Date    CARPAL TUNNEL RELEASE Left 08/2024    CATARACT EXTRACTION Left 07/2024    COLONOSCOPY      KNEE CARTILAGE SURGERY Right 1964    TX AMPUTATION METATARSAL W/TOE SINGLE Right 5/16/2025    Procedure: RAY RESECTION FOOT - R partial 5th ray resection;  Surgeon: Reinaldo Brewer DPM;  Location: [facility];  Service: Podiatry    TX AMPUTATION METATARSAL W/TOE SINGLE Right 5/23/2025    Procedure: Right partial 4th ray amputation, wound debridement;  Surgeon: Brandon Phillips DPM;  Location: [facility];  Service: Podiatry    TX TEAEC W/WO PATCH GRAFT COMMON FEMORAL Right 5/13/2025    Procedure: Right common femoral endarterectomy with bovine pericardial patch Right lower extremity angiogram Third order cannulation of the right anterior tibial artery Balloon angioplasty of the right anterior tibial with a 3x220 Fernando balloon DCB angioplasty of the SFA with a 6x150 [place] balloon Stenting of the right SFA with two 6x150 Grazyna stents, 6x5cm  Viabahn, and a 7x5cm viabahn Post-di    VASECTOMY      WISDOM TOOTH EXTRACTION      x4   [3]   Family History  Problem Relation Name Age of Onset    Asthma Mother      Emphysema Mother      Cancer Father      Asthma Brother      Cancer Brother     [4]   Allergies  Allergen Reactions    Ciprofloxacin Shortness Of Breath    Sulfamethoxazole Shortness Of Breath    Trimethoprim Shortness Of Breath    Dexamethasone Other (See Comments)    Triamcinolone Other (See Comments)    Bactrim [Sulfamethoxazole-Trimethoprim] Fever     Fever of 107   [5]   Current Outpatient Medications on File Prior to Visit   Medication Sig Dispense Refill    albuterol (ACCUNEB) 1.25 MG/3ML nebulizer solution Take 1.25 mg by nebulization every 6 (six) hours as needed for wheezing      apixaban (ELIQUIS) 5 mg 5 mg in the morning and 5 mg in the evening.      atenolol (TENORMIN) 25 mg tablet Take 25 mg by mouth in the morning.      atorvastatin (LIPITOR) 40 mg tablet Take 1 tablet (40 mg total) by mouth every evening 30 tablet 0    Budeson-Glycopyrrol-Formoterol (Breztri Aerosphere) 160-9-4.8 MCG/ACT AERO Take 2 puffs by mouth in the morning and 2 puffs in the evening.      clopidogrel (PLAVIX) 75 mg tablet Take 1 tablet (75 mg total) by mouth daily 90 tablet 0    docusate sodium (COLACE) 100 mg capsule Take 1 capsule (100 mg total) by mouth 2 (two) times a day      famotidine (PEPCID) 20 mg tablet Take 1 tablet (20 mg total) by mouth 2 (two) times a day      glipiZIDE (GLUCOTROL XL) 10 mg 24 hr tablet Take 10 mg by mouth in the morning and 10 mg before bedtime.      lisinopril (ZESTRIL) 5 mg tablet Take 1 tablet (5 mg total) by mouth daily      metFORMIN (GLUCOPHAGE-XR) 500 mg 24 hr tablet Take 500 mg by mouth daily with dinner      montelukast (SINGULAIR) 10 mg tablet Take 1 tablet (10 mg total) by mouth daily at bedtime      Multiple Vitamins-Minerals (Multivitamin Adults) TABS Take 1 tablet by mouth in the morning.      OneTouch Ultra test  strip       oxyCODONE (ROXICODONE) 10 MG TABS Take 1 tablet (10 mg total) by mouth every 4 (four) hours as needed for severe pain ** PAPER SCRIPT FOR SNF USE ONLY ** Max Daily Amount: 60 mg 10 tablet 0    polyethylene glycol (MIRALAX) 17 g packet Take 17 g by mouth daily as needed (constipation)      predniSONE 5 mg tablet Take 1 tablet (5 mg total) by mouth in the morning.      albuterol (PROVENTIL HFA,VENTOLIN HFA) 90 mcg/act inhaler 1 puff if needed      linaGLIPtin (Tradjenta) 5 MG TABS Take 5 mg by mouth daily with dinner (Patient not taking: Reported on 6/10/2025)      LORazepam (ATIVAN) 0.5 mg tablet Take 0.5 mg by mouth if needed (Patient not taking: Reported on 6/10/2025)       No current facility-administered medications on file prior to visit.   [6]   Social History  Tobacco Use    Smoking status: Former    Smokeless tobacco: Never    Tobacco comments:     quit about 25 years ago   Vaping Use    Vaping status: Never Used   Substance and Sexual Activity    Alcohol use: Never    Drug use: Never    Sexual activity: Yes     Partners: Female

## 2025-06-13 ENCOUNTER — TELEPHONE (OUTPATIENT)
Age: 80
End: 2025-06-13

## 2025-06-13 NOTE — TELEPHONE ENCOUNTER
Caller: Mya     Doctor and/or Office: Dr. Galeas/ Wound Care     CB#: [phone]    Escalation: Care Patients spouse stated that the Rehab Center  cannot bring him on Tuesday and they are gong to try to get him there. They are going to do a dry run to see if they can get him out.  If not they asked if there is anyway they can do a virtual visit?  She also wanted to let you know that He did have his doppler done at the nursing home Please advise thank you

## 2025-06-16 ENCOUNTER — OFFICE VISIT (OUTPATIENT)
Dept: VASCULAR SURGERY | Facility: CLINIC | Age: 80
End: 2025-06-16

## 2025-06-16 VITALS — DIASTOLIC BLOOD PRESSURE: 70 MMHG | HEIGHT: 72 IN | BODY MASS INDEX: 20.07 KG/M2 | SYSTOLIC BLOOD PRESSURE: 124 MMHG

## 2025-06-16 DIAGNOSIS — I70.234 ATHEROSCLEROSIS OF NATIVE ARTERIES OF RIGHT LEG WITH ULCERATION OF HEEL AND MIDFOOT (HCC): Primary | ICD-10-CM

## 2025-06-16 PROCEDURE — 99024 POSTOP FOLLOW-UP VISIT: CPT | Performed by: NURSE PRACTITIONER

## 2025-06-16 NOTE — ASSESSMENT & PLAN NOTE
80 yo male ex-smoker w/ a hx of DM II (A1c 9.3), HTN, HLD, asthma, CVA (9/2024), PAF (eliquis), cardiomyopathy and PAD with infected R foot wound s/p R CFA/SFA endarterectomy w/ bovine pericardial patch, DCB and stenting of SFA, and angio of AT 5/13/25 ([facility]),  s/p right fifth ray resection 5/16 and s/p right fourth ray resection 5/20/2025 presents for initial post op follow up.     Patient presents from [facility]  - Presents 5 weeks post op  -     Follows with Podiatry, last seen 6/10. Strick non-weightbearing

## 2025-06-16 NOTE — ASSESSMENT & PLAN NOTE
78 yo male ex-smoker w/ a hx of DM II (A1c 9.3), HTN, HLD, asthma, CVA (9/2024), PAF (eliquis), cardiomyopathy and PAD with infected R foot wound s/p R CFA/SFA endarterectomy w/ bovine pericardial patch, DCB and stenting of SFA, and angio of AT 5/13/25 ([facility]),  s/p right fifth ray resection 5/16 and s/p right fourth ray resection 5/20/2025 presents for initial post op follow up.      [facility]  - 5 weeks post op, first post op visit.  - Presents without lower extremity complaints.  Denies lower extremity pain, or right foot pain.  Patient is strict nonweightbearing and not walking.  - M/S intact  -Doppler right DP and PT signal appreciated  -Right foot dressing intact, follows with podiatry last seen 6/10/2025.  Dressing left in place at office visit today.  Last podiatry note reviewed.  Patient is strict nonweightbearing. Discussion re: TMA awaiting LEAD.    Right groin incision CDI with staples.  No dehiscence, drainage, erythema or signs of infection.  Mild surrounding staple irritation.  No signs of infection.  All staples removed at office visit today    - Per [facility] are available.  Called rehab facility to have them faxed over.  Did not receive by end of office visit.  -Patient has LEAD ordered by podiatry    Plan:  -Postop LEAD with RIC/healing pressures  -Return to office with vascular surgeon in 2 months for postop follow-up  -Continue Eliquis 5 mg twice daily (A-fib)  -Continue Plavix 75 mg daily  -Continue statin, Lipitor 40 mg  -Right foot wound care per podiatry instruction  -Right groin incision cleanse daily soap and water, pat dry.  Paint with Betadine 3-5 days then may leave open to air.  Continue to monitor for dehiscence, drainage, erythema, or signs of infection.  -Call or return to vascular sooner with new or worsening symptoms, changes to incision, questions  or concerns.        Orders:    VAS ARTERIAL DUPLEX- LOWER LIMB BILATERAL; Future

## 2025-06-16 NOTE — PATIENT INSTRUCTIONS
Keep right groin incision site clean and dry.  Wash daily soap and water pat dry.  Paint with Betadine x 3-5 more days .  Cover with dry gauze dressing.  Then may leave open to air.    Right shin wound continue to monitor.  Wash daily soap and water.  Protect from friction, trauma or injury.    Continue Eliquis, Plavix  Continue statin    Follow-up with podiatry as scheduled    Return to vascular office in 2 months with Dr. Aiken, 3 months postop follow-up    Obtain lower extremity arterial duplex as ordered by podiatry.    Call with any questions or concerns or changes to right groin incision.    Right foot wounds care and follow-up per podiatry.

## 2025-06-17 ENCOUNTER — OFFICE VISIT (OUTPATIENT)
Facility: HOSPITAL | Age: 80
End: 2025-06-17
Payer: COMMERCIAL

## 2025-06-17 ENCOUNTER — DOCUMENTATION (OUTPATIENT)
Facility: HOSPITAL | Age: 80
End: 2025-06-17

## 2025-06-17 ENCOUNTER — APPOINTMENT (INPATIENT)
Dept: RADIOLOGY | Facility: HOSPITAL | Age: 80
DRG: 853 | End: 2025-06-17
Payer: COMMERCIAL

## 2025-06-17 ENCOUNTER — APPOINTMENT (EMERGENCY)
Dept: RADIOLOGY | Facility: HOSPITAL | Age: 80
DRG: 853 | End: 2025-06-17
Payer: COMMERCIAL

## 2025-06-17 ENCOUNTER — HOSPITAL ENCOUNTER (INPATIENT)
Facility: HOSPITAL | Age: 80
LOS: 18 days | DRG: 853 | End: 2025-07-05
Attending: EMERGENCY MEDICINE | Admitting: STUDENT IN AN ORGANIZED HEALTH CARE EDUCATION/TRAINING PROGRAM
Payer: COMMERCIAL

## 2025-06-17 ENCOUNTER — TELEPHONE (OUTPATIENT)
Facility: HOSPITAL | Age: 80
End: 2025-06-17

## 2025-06-17 VITALS
SYSTOLIC BLOOD PRESSURE: 92 MMHG | HEART RATE: 144 BPM | TEMPERATURE: 99.6 F | DIASTOLIC BLOOD PRESSURE: 40 MMHG | RESPIRATION RATE: 16 BRPM

## 2025-06-17 DIAGNOSIS — R65.21 SEPTIC SHOCK (HCC): Primary | ICD-10-CM

## 2025-06-17 DIAGNOSIS — E87.1 HYPONATREMIA: ICD-10-CM

## 2025-06-17 DIAGNOSIS — M86.471 CHRONIC OSTEOMYELITIS OF RIGHT FOOT WITH DRAINING SINUS (HCC): ICD-10-CM

## 2025-06-17 DIAGNOSIS — A41.9 SEPTIC SHOCK (HCC): Primary | ICD-10-CM

## 2025-06-17 DIAGNOSIS — M86.9 OSTEOMYELITIS (HCC): ICD-10-CM

## 2025-06-17 DIAGNOSIS — E87.20 METABOLIC ACIDOSIS: ICD-10-CM

## 2025-06-17 DIAGNOSIS — N17.9 AKI (ACUTE KIDNEY INJURY) (HCC): ICD-10-CM

## 2025-06-17 DIAGNOSIS — B95.61 MSSA BACTEREMIA: ICD-10-CM

## 2025-06-17 DIAGNOSIS — E11.621: ICD-10-CM

## 2025-06-17 DIAGNOSIS — A41.9 SEVERE SEPSIS (HCC): Primary | ICD-10-CM

## 2025-06-17 DIAGNOSIS — R78.81 MSSA BACTEREMIA: ICD-10-CM

## 2025-06-17 DIAGNOSIS — D64.9 ANEMIA: ICD-10-CM

## 2025-06-17 DIAGNOSIS — L97.512 ULCER OF RIGHT FOOT WITH FAT LAYER EXPOSED (HCC): ICD-10-CM

## 2025-06-17 DIAGNOSIS — R65.20 SEVERE SEPSIS (HCC): Primary | ICD-10-CM

## 2025-06-17 DIAGNOSIS — L97.419: ICD-10-CM

## 2025-06-17 PROBLEM — L89.159 PRESSURE INJURY OF SKIN OF SACRAL REGION: Status: ACTIVE | Noted: 2025-06-17

## 2025-06-17 PROBLEM — N18.2 ACUTE RENAL FAILURE SUPERIMPOSED ON STAGE 2 CHRONIC KIDNEY DISEASE  (HCC): Status: ACTIVE | Noted: 2021-03-24

## 2025-06-17 PROBLEM — R26.2 AMBULATORY DYSFUNCTION: Status: ACTIVE | Noted: 2025-06-17

## 2025-06-17 LAB
ALBUMIN SERPL BCG-MCNC: 3 G/DL (ref 3.5–5)
ALP SERPL-CCNC: 73 U/L (ref 34–104)
ALT SERPL W P-5'-P-CCNC: 12 U/L (ref 7–52)
ANION GAP SERPL CALCULATED.3IONS-SCNC: 12 MMOL/L (ref 4–13)
ANION GAP SERPL CALCULATED.3IONS-SCNC: 14 MMOL/L (ref 4–13)
ANISOCYTOSIS BLD QL SMEAR: PRESENT
APTT PPP: 29 SECONDS (ref 23–34)
APTT PPP: 32 SECONDS (ref 23–34)
APTT PPP: 41 SECONDS (ref 23–34)
AST SERPL W P-5'-P-CCNC: 14 U/L (ref 13–39)
BACTERIA UR QL AUTO: ABNORMAL /HPF
BASE EX.OXY STD BLDV CALC-SCNC: 81.4 % (ref 60–80)
BASE EXCESS BLDV CALC-SCNC: -7.6 MMOL/L
BASOPHILS # BLD MANUAL: 0 THOUSAND/UL (ref 0–0.1)
BASOPHILS NFR MAR MANUAL: 0 % (ref 0–1)
BILIRUB SERPL-MCNC: 0.64 MG/DL (ref 0.2–1)
BILIRUB UR QL STRIP: NEGATIVE
BNP SERPL-MCNC: 441 PG/ML (ref 0–100)
BUN SERPL-MCNC: 23 MG/DL (ref 5–25)
BUN SERPL-MCNC: 30 MG/DL (ref 5–25)
CA-I BLD-SCNC: 1.04 MMOL/L (ref 1.12–1.32)
CALCIUM ALBUM COR SERPL-MCNC: 9.8 MG/DL (ref 8.3–10.1)
CALCIUM SERPL-MCNC: 7.8 MG/DL (ref 8.4–10.2)
CALCIUM SERPL-MCNC: 9 MG/DL (ref 8.4–10.2)
CHLORIDE SERPL-SCNC: 102 MMOL/L (ref 96–108)
CHLORIDE SERPL-SCNC: 99 MMOL/L (ref 96–108)
CLARITY UR: CLEAR
CO2 SERPL-SCNC: 18 MMOL/L (ref 21–32)
CO2 SERPL-SCNC: 20 MMOL/L (ref 21–32)
COLOR UR: YELLOW
CREAT SERPL-MCNC: 1.49 MG/DL (ref 0.6–1.3)
CREAT SERPL-MCNC: 1.56 MG/DL (ref 0.6–1.3)
EOSINOPHIL # BLD MANUAL: 0 THOUSAND/UL (ref 0–0.4)
EOSINOPHIL NFR BLD MANUAL: 0 % (ref 0–6)
ERYTHROCYTE [DISTWIDTH] IN BLOOD BY AUTOMATED COUNT: 14.6 % (ref 11.6–15.1)
GFR SERPL CREATININE-BSD FRML MDRD: 41 ML/MIN/1.73SQ M
GFR SERPL CREATININE-BSD FRML MDRD: 44 ML/MIN/1.73SQ M
GLUCOSE SERPL-MCNC: 237 MG/DL (ref 65–140)
GLUCOSE SERPL-MCNC: 325 MG/DL (ref 65–140)
GLUCOSE SERPL-MCNC: 338 MG/DL (ref 65–140)
GLUCOSE SERPL-MCNC: 348 MG/DL (ref 65–140)
GLUCOSE UR STRIP-MCNC: ABNORMAL MG/DL
HCO3 BLDV-SCNC: 16.7 MMOL/L (ref 24–30)
HCT VFR BLD AUTO: 28.4 % (ref 36.5–49.3)
HGB BLD-MCNC: 8.8 G/DL (ref 12–17)
HGB UR QL STRIP.AUTO: NEGATIVE
INR PPP: 2.16 (ref 0.85–1.19)
INR PPP: 2.44 (ref 0.85–1.19)
KETONES UR STRIP-MCNC: ABNORMAL MG/DL
LACTATE SERPL-SCNC: 2.1 MMOL/L (ref 0.5–2)
LACTATE SERPL-SCNC: 3.7 MMOL/L (ref 0.5–2)
LACTATE SERPL-SCNC: 3.7 MMOL/L (ref 0.5–2)
LEUKOCYTE ESTERASE UR QL STRIP: NEGATIVE
LYMPHOCYTES # BLD AUTO: 0 % (ref 14–44)
LYMPHOCYTES # BLD AUTO: 0 THOUSAND/UL (ref 0.6–4.47)
MAGNESIUM SERPL-MCNC: 1.4 MG/DL (ref 1.9–2.7)
MAGNESIUM SERPL-MCNC: 1.8 MG/DL (ref 1.9–2.7)
MCH RBC QN AUTO: 28 PG (ref 26.8–34.3)
MCHC RBC AUTO-ENTMCNC: 31 G/DL (ref 31.4–37.4)
MCV RBC AUTO: 90 FL (ref 82–98)
METAMYELOCYTE ABSOLUTE CT: 0.54 THOUSAND/UL (ref 0–0.1)
METAMYELOCYTES NFR BLD MANUAL: 3 % (ref 0–1)
MONOCYTES # BLD AUTO: 0.18 THOUSAND/UL (ref 0–1.22)
MONOCYTES NFR BLD: 1 % (ref 4–12)
NEUTROPHILS # BLD MANUAL: 17.21 THOUSAND/UL (ref 1.85–7.62)
NEUTS BAND NFR BLD MANUAL: 23 % (ref 0–8)
NEUTS SEG NFR BLD AUTO: 73 % (ref 43–75)
NITRITE UR QL STRIP: NEGATIVE
NON-SQ EPI CELLS URNS QL MICRO: ABNORMAL /HPF
O2 CT BLDV-SCNC: 10.1 ML/DL
PCO2 BLDV: 29.1 MM HG (ref 42–50)
PH BLDV: 7.38 [PH] (ref 7.3–7.4)
PH UR STRIP.AUTO: 6 [PH]
PHOSPHATE SERPL-MCNC: 3.1 MG/DL (ref 2.3–4.1)
PHOSPHATE SERPL-MCNC: 4.4 MG/DL (ref 2.3–4.1)
PLATELET # BLD AUTO: 361 THOUSANDS/UL (ref 149–390)
PLATELET BLD QL SMEAR: ABNORMAL
PMV BLD AUTO: 9.3 FL (ref 8.9–12.7)
PO2 BLDV: 50.7 MM HG (ref 35–45)
POIKILOCYTOSIS BLD QL SMEAR: PRESENT
POLYCHROMASIA BLD QL SMEAR: PRESENT
POTASSIUM SERPL-SCNC: 4 MMOL/L (ref 3.5–5.3)
POTASSIUM SERPL-SCNC: 4.1 MMOL/L (ref 3.5–5.3)
PROCALCITONIN SERPL-MCNC: 14.91 NG/ML
PROT SERPL-MCNC: 6.6 G/DL (ref 6.4–8.4)
PROT UR STRIP-MCNC: ABNORMAL MG/DL
PROTHROMBIN TIME: 24.5 SECONDS (ref 12.3–15)
PROTHROMBIN TIME: 26.8 SECONDS (ref 12.3–15)
RBC # BLD AUTO: 3.14 MILLION/UL (ref 3.88–5.62)
RBC #/AREA URNS AUTO: ABNORMAL /HPF
RBC MORPH BLD: PRESENT
SODIUM SERPL-SCNC: 132 MMOL/L (ref 135–147)
SODIUM SERPL-SCNC: 133 MMOL/L (ref 135–147)
SP GR UR STRIP.AUTO: 1.02
UROBILINOGEN UR QL STRIP.AUTO: 0.2 E.U./DL
WBC # BLD AUTO: 17.93 THOUSAND/UL (ref 4.31–10.16)
WBC #/AREA URNS AUTO: ABNORMAL /HPF

## 2025-06-17 PROCEDURE — 4A133J1 MONITORING OF ARTERIAL PULSE, PERIPHERAL, PERCUTANEOUS APPROACH: ICD-10-PCS | Performed by: STUDENT IN AN ORGANIZED HEALTH CARE EDUCATION/TRAINING PROGRAM

## 2025-06-17 PROCEDURE — 99284 EMERGENCY DEPT VISIT MOD MDM: CPT

## 2025-06-17 PROCEDURE — 76937 US GUIDE VASCULAR ACCESS: CPT | Performed by: STUDENT IN AN ORGANIZED HEALTH CARE EDUCATION/TRAINING PROGRAM

## 2025-06-17 PROCEDURE — 99291 CRITICAL CARE FIRST HOUR: CPT | Performed by: STUDENT IN AN ORGANIZED HEALTH CARE EDUCATION/TRAINING PROGRAM

## 2025-06-17 PROCEDURE — 85610 PROTHROMBIN TIME: CPT | Performed by: STUDENT IN AN ORGANIZED HEALTH CARE EDUCATION/TRAINING PROGRAM

## 2025-06-17 PROCEDURE — 80053 COMPREHEN METABOLIC PANEL: CPT

## 2025-06-17 PROCEDURE — 87186 SC STD MICRODIL/AGAR DIL: CPT

## 2025-06-17 PROCEDURE — 82805 BLOOD GASES W/O2 SATURATION: CPT

## 2025-06-17 PROCEDURE — 83735 ASSAY OF MAGNESIUM: CPT | Performed by: NURSE PRACTITIONER

## 2025-06-17 PROCEDURE — 73590 X-RAY EXAM OF LOWER LEG: CPT

## 2025-06-17 PROCEDURE — 96367 TX/PROPH/DG ADDL SEQ IV INF: CPT

## 2025-06-17 PROCEDURE — 99024 POSTOP FOLLOW-UP VISIT: CPT | Performed by: PODIATRIST

## 2025-06-17 PROCEDURE — 83605 ASSAY OF LACTIC ACID: CPT | Performed by: NURSE PRACTITIONER

## 2025-06-17 PROCEDURE — 81003 URINALYSIS AUTO W/O SCOPE: CPT | Performed by: NURSE PRACTITIONER

## 2025-06-17 PROCEDURE — NC001 PR NO CHARGE: Performed by: STUDENT IN AN ORGANIZED HEALTH CARE EDUCATION/TRAINING PROGRAM

## 2025-06-17 PROCEDURE — 83880 ASSAY OF NATRIURETIC PEPTIDE: CPT

## 2025-06-17 PROCEDURE — 85730 THROMBOPLASTIN TIME PARTIAL: CPT

## 2025-06-17 PROCEDURE — 96361 HYDRATE IV INFUSION ADD-ON: CPT

## 2025-06-17 PROCEDURE — 87147 CULTURE TYPE IMMUNOLOGIC: CPT | Performed by: STUDENT IN AN ORGANIZED HEALTH CARE EDUCATION/TRAINING PROGRAM

## 2025-06-17 PROCEDURE — 99291 CRITICAL CARE FIRST HOUR: CPT | Performed by: EMERGENCY MEDICINE

## 2025-06-17 PROCEDURE — 87040 BLOOD CULTURE FOR BACTERIA: CPT

## 2025-06-17 PROCEDURE — 36415 COLL VENOUS BLD VENIPUNCTURE: CPT

## 2025-06-17 PROCEDURE — 82948 REAGENT STRIP/BLOOD GLUCOSE: CPT

## 2025-06-17 PROCEDURE — 85007 BL SMEAR W/DIFF WBC COUNT: CPT

## 2025-06-17 PROCEDURE — 99213 OFFICE O/P EST LOW 20 MIN: CPT | Performed by: PODIATRIST

## 2025-06-17 PROCEDURE — 81001 URINALYSIS AUTO W/SCOPE: CPT | Performed by: NURSE PRACTITIONER

## 2025-06-17 PROCEDURE — 80048 BASIC METABOLIC PNL TOTAL CA: CPT | Performed by: NURSE PRACTITIONER

## 2025-06-17 PROCEDURE — 87154 CUL TYP ID BLD PTHGN 6+ TRGT: CPT

## 2025-06-17 PROCEDURE — 85610 PROTHROMBIN TIME: CPT

## 2025-06-17 PROCEDURE — 71045 X-RAY EXAM CHEST 1 VIEW: CPT

## 2025-06-17 PROCEDURE — 84145 PROCALCITONIN (PCT): CPT

## 2025-06-17 PROCEDURE — 83605 ASSAY OF LACTIC ACID: CPT

## 2025-06-17 PROCEDURE — 94760 N-INVAS EAR/PLS OXIMETRY 1: CPT

## 2025-06-17 PROCEDURE — 36556 INSERT NON-TUNNEL CV CATH: CPT | Performed by: STUDENT IN AN ORGANIZED HEALTH CARE EDUCATION/TRAINING PROGRAM

## 2025-06-17 PROCEDURE — 85730 THROMBOPLASTIN TIME PARTIAL: CPT | Performed by: STUDENT IN AN ORGANIZED HEALTH CARE EDUCATION/TRAINING PROGRAM

## 2025-06-17 PROCEDURE — 96365 THER/PROPH/DIAG IV INF INIT: CPT

## 2025-06-17 PROCEDURE — 4A133B1 MONITORING OF ARTERIAL PRESSURE, PERIPHERAL, PERCUTANEOUS APPROACH: ICD-10-PCS | Performed by: STUDENT IN AN ORGANIZED HEALTH CARE EDUCATION/TRAINING PROGRAM

## 2025-06-17 PROCEDURE — 82330 ASSAY OF CALCIUM: CPT | Performed by: NURSE PRACTITIONER

## 2025-06-17 PROCEDURE — 87081 CULTURE SCREEN ONLY: CPT | Performed by: STUDENT IN AN ORGANIZED HEALTH CARE EDUCATION/TRAINING PROGRAM

## 2025-06-17 PROCEDURE — 94664 DEMO&/EVAL PT USE INHALER: CPT

## 2025-06-17 PROCEDURE — 85027 COMPLETE CBC AUTOMATED: CPT

## 2025-06-17 PROCEDURE — 36620 INSERTION CATHETER ARTERY: CPT | Performed by: STUDENT IN AN ORGANIZED HEALTH CARE EDUCATION/TRAINING PROGRAM

## 2025-06-17 PROCEDURE — 03HY32Z INSERTION OF MONITORING DEVICE INTO UPPER ARTERY, PERCUTANEOUS APPROACH: ICD-10-PCS | Performed by: STUDENT IN AN ORGANIZED HEALTH CARE EDUCATION/TRAINING PROGRAM

## 2025-06-17 PROCEDURE — 02HV33Z INSERTION OF INFUSION DEVICE INTO SUPERIOR VENA CAVA, PERCUTANEOUS APPROACH: ICD-10-PCS | Performed by: STUDENT IN AN ORGANIZED HEALTH CARE EDUCATION/TRAINING PROGRAM

## 2025-06-17 PROCEDURE — 73630 X-RAY EXAM OF FOOT: CPT

## 2025-06-17 PROCEDURE — 84100 ASSAY OF PHOSPHORUS: CPT | Performed by: NURSE PRACTITIONER

## 2025-06-17 RX ORDER — FAMOTIDINE 20 MG/1
20 TABLET, FILM COATED ORAL DAILY
Status: DISCONTINUED | OUTPATIENT
Start: 2025-06-17 | End: 2025-07-05 | Stop reason: HOSPADM

## 2025-06-17 RX ORDER — MAGNESIUM SULFATE HEPTAHYDRATE 40 MG/ML
2 INJECTION, SOLUTION INTRAVENOUS ONCE
Status: COMPLETED | OUTPATIENT
Start: 2025-06-17 | End: 2025-06-18

## 2025-06-17 RX ORDER — POLYETHYLENE GLYCOL 3350 17 G/17G
17 POWDER, FOR SOLUTION ORAL DAILY PRN
Status: DISCONTINUED | OUTPATIENT
Start: 2025-06-17 | End: 2025-07-05 | Stop reason: HOSPADM

## 2025-06-17 RX ORDER — AMOXICILLIN 250 MG
2 CAPSULE ORAL
Status: DISCONTINUED | OUTPATIENT
Start: 2025-06-17 | End: 2025-07-05 | Stop reason: HOSPADM

## 2025-06-17 RX ORDER — ATORVASTATIN CALCIUM 40 MG/1
40 TABLET, FILM COATED ORAL EVERY EVENING
Status: DISCONTINUED | OUTPATIENT
Start: 2025-06-17 | End: 2025-07-05 | Stop reason: HOSPADM

## 2025-06-17 RX ORDER — ACETAMINOPHEN 160 MG/5ML
1 SUSPENSION, ORAL (FINAL DOSE FORM) ORAL ONCE
Status: COMPLETED | OUTPATIENT
Start: 2025-06-17 | End: 2025-06-17

## 2025-06-17 RX ORDER — ALBUMIN HUMAN 50 G/1000ML
25 SOLUTION INTRAVENOUS ONCE
Status: COMPLETED | OUTPATIENT
Start: 2025-06-17 | End: 2025-06-17

## 2025-06-17 RX ORDER — MONTELUKAST SODIUM 10 MG/1
10 TABLET ORAL
Status: DISCONTINUED | OUTPATIENT
Start: 2025-06-17 | End: 2025-07-05 | Stop reason: HOSPADM

## 2025-06-17 RX ORDER — FLUDROCORTISONE ACETATE 0.1 MG/1
0.05 TABLET ORAL DAILY
Status: DISCONTINUED | OUTPATIENT
Start: 2025-06-17 | End: 2025-06-18

## 2025-06-17 RX ORDER — ACETAMINOPHEN 10 MG/ML
1000 INJECTION, SOLUTION INTRAVENOUS ONCE
Status: DISCONTINUED | OUTPATIENT
Start: 2025-06-17 | End: 2025-06-17

## 2025-06-17 RX ORDER — VANCOMYCIN HYDROCHLORIDE 1 G/200ML
15 INJECTION, SOLUTION INTRAVENOUS ONCE
Status: COMPLETED | OUTPATIENT
Start: 2025-06-17 | End: 2025-06-17

## 2025-06-17 RX ORDER — HEPARIN SODIUM 10000 [USP'U]/100ML
3-27 INJECTION, SOLUTION INTRAVENOUS
Status: DISCONTINUED | OUTPATIENT
Start: 2025-06-17 | End: 2025-06-20

## 2025-06-17 RX ORDER — DOCUSATE SODIUM 100 MG/1
100 CAPSULE, LIQUID FILLED ORAL 2 TIMES DAILY
Status: DISCONTINUED | OUTPATIENT
Start: 2025-06-17 | End: 2025-06-17

## 2025-06-17 RX ORDER — FAMOTIDINE 20 MG/1
20 TABLET, FILM COATED ORAL 2 TIMES DAILY
Status: DISCONTINUED | OUTPATIENT
Start: 2025-06-17 | End: 2025-06-17

## 2025-06-17 RX ORDER — INSULIN LISPRO 100 [IU]/ML
1-5 INJECTION, SOLUTION INTRAVENOUS; SUBCUTANEOUS EVERY 6 HOURS SCHEDULED
Status: DISCONTINUED | OUTPATIENT
Start: 2025-06-17 | End: 2025-06-17

## 2025-06-17 RX ORDER — ONDANSETRON 2 MG/ML
4 INJECTION INTRAMUSCULAR; INTRAVENOUS EVERY 6 HOURS PRN
Status: DISCONTINUED | OUTPATIENT
Start: 2025-06-17 | End: 2025-07-05 | Stop reason: HOSPADM

## 2025-06-17 RX ORDER — ACETAMINOPHEN 325 MG/1
325 TABLET ORAL ONCE
Status: COMPLETED | OUTPATIENT
Start: 2025-06-17 | End: 2025-06-17

## 2025-06-17 RX ORDER — SODIUM CHLORIDE, SODIUM GLUCONATE, SODIUM ACETATE, POTASSIUM CHLORIDE, MAGNESIUM CHLORIDE, SODIUM PHOSPHATE, DIBASIC, AND POTASSIUM PHOSPHATE .53; .5; .37; .037; .03; .012; .00082 G/100ML; G/100ML; G/100ML; G/100ML; G/100ML; G/100ML; G/100ML
1000 INJECTION, SOLUTION INTRAVENOUS ONCE
Status: COMPLETED | OUTPATIENT
Start: 2025-06-17 | End: 2025-06-17

## 2025-06-17 RX ORDER — ALBUTEROL SULFATE 0.83 MG/ML
2.5 SOLUTION RESPIRATORY (INHALATION) EVERY 6 HOURS PRN
Status: DISCONTINUED | OUTPATIENT
Start: 2025-06-17 | End: 2025-07-05 | Stop reason: HOSPADM

## 2025-06-17 RX ORDER — VANCOMYCIN HYDROCHLORIDE 1 G/200ML
15 INJECTION, SOLUTION INTRAVENOUS EVERY 24 HOURS
Status: DISCONTINUED | OUTPATIENT
Start: 2025-06-18 | End: 2025-06-19

## 2025-06-17 RX ORDER — ALBUMIN HUMAN 50 G/1000ML
25 SOLUTION INTRAVENOUS ONCE
Status: COMPLETED | OUTPATIENT
Start: 2025-06-17 | End: 2025-06-18

## 2025-06-17 RX ORDER — ACETAMINOPHEN 325 MG/1
650 TABLET ORAL EVERY 6 HOURS PRN
Status: DISCONTINUED | OUTPATIENT
Start: 2025-06-17 | End: 2025-07-05 | Stop reason: HOSPADM

## 2025-06-17 RX ORDER — HYDROCORTISONE SODIUM SUCCINATE 100 MG/2ML
50 INJECTION INTRAMUSCULAR; INTRAVENOUS EVERY 6 HOURS SCHEDULED
Status: DISCONTINUED | OUTPATIENT
Start: 2025-06-17 | End: 2025-06-18

## 2025-06-17 RX ORDER — SODIUM CHLORIDE, SODIUM GLUCONATE, SODIUM ACETATE, POTASSIUM CHLORIDE, MAGNESIUM CHLORIDE, SODIUM PHOSPHATE, DIBASIC, AND POTASSIUM PHOSPHATE .53; .5; .37; .037; .03; .012; .00082 G/100ML; G/100ML; G/100ML; G/100ML; G/100ML; G/100ML; G/100ML
75 INJECTION, SOLUTION INTRAVENOUS CONTINUOUS
Status: DISCONTINUED | OUTPATIENT
Start: 2025-06-17 | End: 2025-06-18

## 2025-06-17 RX ORDER — ALBUTEROL SULFATE 0.83 MG/ML
1.25 SOLUTION RESPIRATORY (INHALATION) EVERY 6 HOURS PRN
Status: DISCONTINUED | OUTPATIENT
Start: 2025-06-17 | End: 2025-06-17

## 2025-06-17 RX ORDER — CHLORHEXIDINE GLUCONATE ORAL RINSE 1.2 MG/ML
15 SOLUTION DENTAL EVERY 12 HOURS SCHEDULED
Status: DISCONTINUED | OUTPATIENT
Start: 2025-06-17 | End: 2025-06-18

## 2025-06-17 RX ORDER — CALCIUM GLUCONATE 20 MG/ML
2 INJECTION, SOLUTION INTRAVENOUS ONCE
Status: COMPLETED | OUTPATIENT
Start: 2025-06-17 | End: 2025-06-18

## 2025-06-17 RX ORDER — BUDESONIDE AND FORMOTEROL FUMARATE DIHYDRATE 160; 4.5 UG/1; UG/1
2 AEROSOL RESPIRATORY (INHALATION) 2 TIMES DAILY
Status: DISCONTINUED | OUTPATIENT
Start: 2025-06-17 | End: 2025-06-18

## 2025-06-17 RX ORDER — PANTOPRAZOLE SODIUM 40 MG/10ML
40 INJECTION, POWDER, LYOPHILIZED, FOR SOLUTION INTRAVENOUS DAILY
Status: DISCONTINUED | OUTPATIENT
Start: 2025-06-17 | End: 2025-06-17

## 2025-06-17 RX ADMIN — ALBUMIN (HUMAN) 25 G: 12.5 INJECTION, SOLUTION INTRAVENOUS at 19:21

## 2025-06-17 RX ADMIN — MAGNESIUM SULFATE HEPTAHYDRATE 2 G: 40 INJECTION, SOLUTION INTRAVENOUS at 23:38

## 2025-06-17 RX ADMIN — FAMOTIDINE 20 MG: 20 TABLET, FILM COATED ORAL at 15:30

## 2025-06-17 RX ADMIN — ATORVASTATIN CALCIUM 40 MG: 40 TABLET, FILM COATED ORAL at 18:27

## 2025-06-17 RX ADMIN — SODIUM CHLORIDE 1000 ML: 0.9 INJECTION, SOLUTION INTRAVENOUS at 11:06

## 2025-06-17 RX ADMIN — Medication 1 TABLET: at 16:04

## 2025-06-17 RX ADMIN — ACETAMINOPHEN 325 MG: 325 TABLET ORAL at 11:27

## 2025-06-17 RX ADMIN — HEPARIN SODIUM 12 UNITS/KG/HR: 10000 INJECTION, SOLUTION INTRAVENOUS at 15:27

## 2025-06-17 RX ADMIN — NOREPINEPHRINE BITARTRATE 10 MCG/MIN: 1 INJECTION INTRAVENOUS at 12:14

## 2025-06-17 RX ADMIN — SODIUM CHLORIDE, SODIUM GLUCONATE, SODIUM ACETATE, POTASSIUM CHLORIDE, MAGNESIUM CHLORIDE, SODIUM PHOSPHATE, DIBASIC, AND POTASSIUM PHOSPHATE 125 ML/HR: .53; .5; .37; .037; .03; .012; .00082 INJECTION, SOLUTION INTRAVENOUS at 13:49

## 2025-06-17 RX ADMIN — CEFTRIAXONE SODIUM 2000 MG: 10 INJECTION, POWDER, FOR SOLUTION INTRAVENOUS at 11:25

## 2025-06-17 RX ADMIN — FLUDROCORTISONE ACETATE 0.05 MG: 0.1 TABLET ORAL at 15:30

## 2025-06-17 RX ADMIN — Medication 4.5 G: at 18:28

## 2025-06-17 RX ADMIN — SODIUM CHLORIDE 1000 ML: 0.9 INJECTION, SOLUTION INTRAVENOUS at 11:46

## 2025-06-17 RX ADMIN — NOREPINEPHRINE BITARTRATE 5 MCG/MIN: 1 INJECTION INTRAVENOUS at 12:09

## 2025-06-17 RX ADMIN — NOREPINEPHRINE BITARTRATE 13 MCG/MIN: 1 INJECTION INTRAVENOUS at 18:29

## 2025-06-17 RX ADMIN — SODIUM CHLORIDE, SODIUM GLUCONATE, SODIUM ACETATE, POTASSIUM CHLORIDE, MAGNESIUM CHLORIDE, SODIUM PHOSPHATE, DIBASIC, AND POTASSIUM PHOSPHATE 1000 ML: .53; .5; .37; .037; .03; .012; .00082 INJECTION, SOLUTION INTRAVENOUS at 18:37

## 2025-06-17 RX ADMIN — HYDROCORTISONE SODIUM SUCCINATE 50 MG: 100 INJECTION, POWDER, FOR SOLUTION INTRAMUSCULAR; INTRAVENOUS at 23:39

## 2025-06-17 RX ADMIN — PIPERACILLIN AND TAZOBACTAM 4.5 G: 4; .5 INJECTION, POWDER, FOR SOLUTION INTRAVENOUS at 16:00

## 2025-06-17 RX ADMIN — HYDROCORTISONE SODIUM SUCCINATE 50 MG: 100 INJECTION, POWDER, FOR SOLUTION INTRAMUSCULAR; INTRAVENOUS at 18:28

## 2025-06-17 RX ADMIN — HYDROCORTISONE SODIUM SUCCINATE 50 MG: 100 INJECTION, POWDER, FOR SOLUTION INTRAMUSCULAR; INTRAVENOUS at 13:54

## 2025-06-17 RX ADMIN — ALBUMIN (HUMAN) 25 G: 12.5 INJECTION, SOLUTION INTRAVENOUS at 23:34

## 2025-06-17 RX ADMIN — BUDESONIDE AND FORMOTEROL FUMARATE DIHYDRATE 2 PUFF: 160; 4.5 AEROSOL RESPIRATORY (INHALATION) at 18:28

## 2025-06-17 RX ADMIN — DOCUSATE SODIUM 100 MG: 100 CAPSULE, LIQUID FILLED ORAL at 18:27

## 2025-06-17 RX ADMIN — SODIUM CHLORIDE 8 UNITS/HR: 9 INJECTION, SOLUTION INTRAVENOUS at 23:57

## 2025-06-17 RX ADMIN — MONTELUKAST 10 MG: 10 TABLET, FILM COATED ORAL at 21:26

## 2025-06-17 RX ADMIN — CHLORHEXIDINE GLUCONATE 15 ML: 1.2 SOLUTION ORAL at 13:54

## 2025-06-17 RX ADMIN — VANCOMYCIN HYDROCHLORIDE 1000 MG: 1 INJECTION, SOLUTION INTRAVENOUS at 11:49

## 2025-06-17 RX ADMIN — CALCIUM GLUCONATE 2 G: 20 INJECTION, SOLUTION INTRAVENOUS at 23:38

## 2025-06-17 RX ADMIN — SODIUM CHLORIDE 1000 ML: 0.9 INJECTION, SOLUTION INTRAVENOUS at 11:17

## 2025-06-17 RX ADMIN — INSULIN LISPRO 4 UNITS: 100 INJECTION, SOLUTION INTRAVENOUS; SUBCUTANEOUS at 19:21

## 2025-06-17 RX ADMIN — CHLORHEXIDINE GLUCONATE 15 ML: 1.2 SOLUTION ORAL at 21:26

## 2025-06-17 NOTE — ASSESSMENT & PLAN NOTE
Holding home atenolol and Eliquis due to hypotension and plan for surgery later this week  Starting heparin infusion ACS protocol  Continuous telemetry monitoring

## 2025-06-17 NOTE — ASSESSMENT & PLAN NOTE
On admission cr 1.49, baseline 0.75-0.90. In the setting of septic shock  Given 3 L IVF in the ED  Continue with Isolyte at 125 ml/hr  Check U/A  Trend renal function and I/O status daily

## 2025-06-17 NOTE — PROCEDURES
Arterial Line Insertion    Date/Time: 6/17/2025 2:50 PM    Performed by: Maged Garza MD PhD  Authorized by: Maged Garza MD PhD    Patient location:  ICU  Consent:     Consent obtained:  Written    Consent given by:  Patient    Risks discussed:  Bleeding, infection and pain  Universal protocol:     Procedure explained and questions answered to patient or proxy's satisfaction: yes      Relevant documents present and verified: yes      Test results available and properly labeled: yes      Immediately prior to procedure a time out was called: yes      Patient identity confirmed:  Verbally with patient, arm band, provided demographic data and hospital-assigned identification number  Indications:     Indications: hemodynamic monitoring    Pre-procedure details:     Skin preparation:  Chlorhexidine  Anesthesia (see MAR for exact dosages):     Anesthesia method:  Local infiltration    Local anesthetic:  Lidocaine 1% w/o epi  Procedure details:   Line Type: Arterial Line    Location / Tip of Catheter:  Radial    Laterality:  Left    Needle gauge:  20 G    Placement technique:  Seldinger and ultrasound guided    Ultrasound image availability:  Still images obtained    Sterile ultrasound techniques: Sterile gel and sterile probe covers were used      Number of attempts:  2    Successful placement: yes      Transducer: waveform confirmed    Post-procedure details:     Post-procedure:  Biopatch applied and sutured    Patient tolerance of procedure:  Tolerated well, no immediate complications

## 2025-06-17 NOTE — PROGRESS NOTES
Patient ID: Gold Van is a 79 y.o. male Date of Birth 1945       No chief complaint on file.      Allergies:  Ciprofloxacin, Sulfamethoxazole, Trimethoprim, Dexamethasone, Triamcinolone, and Bactrim [sulfamethoxazole-trimethoprim]    Diagnosis:  1. Sepsis, due to unspecified organism, unspecified whether acute organ dysfunction present (HCC)  2. 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 Sepsis, due to unspecified organism, unspecified whether acute organ dysfunction present (HCC)  A41.9       2. 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   - At this point in time due to the severity of the previous appearance of his right foot, highly likely that he is having sepsis from the foot as a source  -His blood pressure is decreased, his heart rate is increased there is a source of infection.  - Even without acute signs of infection, the right foot does have bone exposed, I would recommend ER evaluation, workup for sepsis and admission to the hospital.  I recommend blood cultures and Betadine wet-to-dry to the right foot  - We were planning for potential outpatient TMA versus local wound care skin grafting limb salvage, but at this point we do not have any further options and will be performing a rotational flap with a more proximal TMA and likely long-term antibiotics  - I did discuss the risks of the limb and life of not going for evaluation and admission    Subjective:   Patient points for evaluation and management of his right foot.  Dressing is intact, patient is currently in wheelchair, he is saying that he has to go to the bathroom.  I also took further history from the patient's wife and also help from his current nursing home.  They report that over the past couple of days he really has not been acting himself.  They report that he has been lethargic, nauseous  and dizzy.  He is currently unable to get out of the wheelchair, he cannot even help assist transfer himself to the bathroom.  This is not his baseline        The following portions of the patient's history were reviewed and updated as appropriate:   Problem List[1]  Past Medical History[2]  Past Surgical History[3]  Social History[4]   Current Medications[5]  Family History[6]   Review of Systems   Constitutional:  Negative for chills and fever.   HENT:  Negative for ear pain and sore throat.    Eyes:  Negative for pain and visual disturbance.   Respiratory:  Negative for cough and shortness of breath.    Cardiovascular:  Negative for chest pain and palpitations.   Gastrointestinal:  Negative for abdominal pain and vomiting.   Genitourinary:  Negative for dysuria and hematuria.   Musculoskeletal:  Negative for arthralgias and back pain.   Skin:  Negative for color change and rash.   Neurological:  Negative for seizures and syncope.   All other systems reviewed and are negative.        Objective:  There were no vitals taken for this visit.    Physical Exam    Skin:     Comments: At this point in time he is lethargic, he can barely lift his head up, he has drool extending down the front of his chest.  The dressing is intact well-maintained with no strikethrough.  No sting cellulitis of the right lower extremity.  But the patient is visibly tired lethargic and uncomfortable.             Wound 05/13/25 Surgical Closed Surgical Incision Thigh Anterior;Proximal;Right (Active)       Wound 05/15/25 Irritant Contact Dermatitis Perspiration Sacrum Mid (Active)       Wound 05/16/25 Diabetic Ulcer Foot Right;Lateral (Active)                         Procedures     Results from last 6 Months   Lab Units 05/16/25  6887   WOUND CULTURE  3+ Growth of Serratia marcescens*  3+ Growth of Enterococcus faecalis*  1+ Growth of Pseudomonas aeruginosa*         Wound Instructions:  No orders of the defined types were placed in this  "encounter.        Agustin Galeas DPM      Portions of the record may have been created with voice recognition software. Occasional wrong word or \"sound a like\" substitutions may have occurred due to the inherent limitations of voice recognition software. Read the chart carefully and recognize, using context, where substitutions have occurred.           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[facility])    Shortness of breath    COVID-19    Acute respiratory insufficiency    Dysmetria    CVA (cerebrovascular accident) ([facility])    Abnormal echocardiogram    Gastroesophageal reflux disease without esophagitis    Impaired mobility and activities of daily living    Neuropathy    Foot drop, left    Moderate protein-calorie malnutrition (HCC)    Severe peripheral arterial disease (HCC)    Ulcer of right foot with fat layer exposed secondary to osteomyelitis    PAF (paroxysmal atrial fibrillation) (HCC)    Chronic heart failure with preserved ejection fraction (HFpEF) (HCC)    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)    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 (HCC)    Sepsis without acute organ dysfunction (HCC)    Pulmonary hypertension (HCC)    Postoperative anemia    Osteomyelitis (HCC)    Troponin I above reference range    Volume overload   [2]   Past Medical History:  Diagnosis Date    Asthma     COVID-19     CVA (cerebral vascular accident) (HCC)     Diabetes mellitus (HCC)     " Environmental allergies     Hyperlipidemia     Hypertension     Macular degeneration 07/13/2020    right eye    Orthostatic hypotension     Osteopenia     Pneumonia     Pneumothorax     Sinusitis    [3]   Past Surgical History:  Procedure Laterality Date    CARPAL TUNNEL RELEASE Left 08/2024    CATARACT EXTRACTION Left 07/2024    COLONOSCOPY      KNEE CARTILAGE SURGERY Right 1964    GA AMPUTATION METATARSAL W/TOE SINGLE Right 5/16/2025    Procedure: RAY RESECTION FOOT - R partial 5th ray resection;  Surgeon: Reinaldo Brewer DPM;  Location: [facility];  Service: Podiatry    GA AMPUTATION METATARSAL W/TOE SINGLE Right 5/23/2025    Procedure: Right partial 4th ray amputation, wound debridement;  Surgeon: Brandon Phillips DPM;  Location: [facility];  Service: Podiatry    GA TEAEC W/WO PATCH GRAFT COMMON FEMORAL Right 5/13/2025    Procedure: Right common femoral endarterectomy with bovine pericardial patch Right lower extremity angiogram Third order cannulation of the right anterior tibial artery Balloon angioplasty of the right anterior tibial with a 3x220 [place] balloon DCB angioplasty of the SFA with a 6x150 [place] balloon Stenting of the right SFA with two 6x150 [place] stents, 6x5cm Viabahn, and a 7x5cm viabahn Post-di    VASECTOMY      WISDOM TOOTH EXTRACTION      x4   [4]   Social History  Socioeconomic History    Marital status: /Civil Union   Tobacco Use    Smoking status: Former    Smokeless tobacco: Never    Tobacco comments:     quit about 25 years ago   Vaping Use    Vaping status: Never Used   Substance and Sexual Activity    Alcohol use: Never    Drug use: Never    Sexual activity: Yes     Partners: Female   Social History Narrative    Daily caffeine use- 1 cup of tea, 2 cups of coffee     Social Drivers of Health     Food Insecurity: No Food Insecurity (5/13/2025)    Nursing - Inadequate Food Risk Classification     Worried About Running Out of Food in the Last Year: Never true     Ran Out of Food  in the Last Year: Never true     Ran Out of Food in the Last Year: Never true   Transportation Needs: No Transportation Needs (5/13/2025)    Nursing - Transportation Risk Classification     Lack of Transportation: No    Received from [facility]   Intimate Partner Violence: Unknown (5/13/2025)    Nursing IPS     Physically Hurt by Someone: No     Hurt or Threatened by Someone: No   Housing Stability: Unknown (5/13/2025)    Nursing: Inadequate Housing Risk Classification     Unable to Pay for Housing in the Last Year: No     Has Housing: No   [5]   Current Outpatient Medications:     albuterol (ACCUNEB) 1.25 MG/3ML nebulizer solution, Take 1.25 mg by nebulization every 6 (six) hours as needed for wheezing, Disp: , Rfl:     albuterol (PROVENTIL HFA,VENTOLIN HFA) 90 mcg/act inhaler, 1 puff if needed, Disp: , Rfl:     apixaban (ELIQUIS) 5 mg, 5 mg in the morning and 5 mg in the evening., Disp: , Rfl:     atenolol (TENORMIN) 25 mg tablet, Take 25 mg by mouth in the morning., Disp: , Rfl:     atorvastatin (LIPITOR) 40 mg tablet, Take 1 tablet (40 mg total) by mouth every evening, Disp: 30 tablet, Rfl: 0    Budeson-Glycopyrrol-Formoterol (Breztri Aerosphere) 160-9-4.8 MCG/ACT AERO, Take 2 puffs by mouth in the morning and 2 puffs in the evening., Disp: , Rfl:     clopidogrel (PLAVIX) 75 mg tablet, Take 1 tablet (75 mg total) by mouth daily, Disp: 90 tablet, Rfl: 0    docusate sodium (COLACE) 100 mg capsule, Take 1 capsule (100 mg total) by mouth 2 (two) times a day, Disp: , Rfl:     famotidine (PEPCID) 20 mg tablet, Take 1 tablet (20 mg total) by mouth 2 (two) times a day, Disp: , Rfl:     glipiZIDE (GLUCOTROL XL) 10 mg 24 hr tablet, Take 10 mg by mouth in the morning and 10 mg before bedtime., Disp: , Rfl:     linaGLIPtin (Tradjenta) 5 MG TABS, Take 5 mg by mouth daily with dinner (Patient not taking: Reported on 6/10/2025), Disp: , Rfl:     lisinopril (ZESTRIL) 5 mg tablet, Take 1 tablet (5 mg total)  by mouth daily, Disp: , Rfl:     LORazepam (ATIVAN) 0.5 mg tablet, Take 0.5 mg by mouth if needed (Patient not taking: Reported on 6/10/2025), Disp: , Rfl:     metFORMIN (GLUCOPHAGE-XR) 500 mg 24 hr tablet, Take 500 mg by mouth daily with dinner, Disp: , Rfl:     montelukast (SINGULAIR) 10 mg tablet, Take 1 tablet (10 mg total) by mouth daily at bedtime, Disp: , Rfl:     Multiple Vitamins-Minerals (Multivitamin Adults) TABS, Take 1 tablet by mouth in the morning., Disp: , Rfl:     OneTouch Ultra test strip, , Disp: , Rfl:     oxyCODONE (ROXICODONE) 10 MG TABS, Take 1 tablet (10 mg total) by mouth every 4 (four) hours as needed for severe pain ** PAPER SCRIPT FOR SNF USE ONLY ** Max Daily Amount: 60 mg, Disp: 10 tablet, Rfl: 0    polyethylene glycol (MIRALAX) 17 g packet, Take 17 g by mouth daily as needed (constipation), Disp: , Rfl:     predniSONE 5 mg tablet, Take 1 tablet (5 mg total) by mouth in the morning., Disp: , Rfl:   [6]   Family History  Problem Relation Name Age of Onset    Asthma Mother      Emphysema Mother      Cancer Father      Asthma Brother      Cancer Brother

## 2025-06-17 NOTE — ASSESSMENT & PLAN NOTE
"Lab Results   Component Value Date    HGBA1C 9.3 (H) 04/09/2025       No results for input(s): \"POCGLU\" in the last 72 hours.    Blood Sugar Average: Last 72 hrs:    History of poorly controlled diabetes and hyperglycemic on admission  Home regimen: Glipizide 10 mg twice daily, metformin 500 daily  Hold home medications and start subcu insulin every 6 hours while NPO  "

## 2025-06-17 NOTE — ASSESSMENT & PLAN NOTE
Wt Readings from Last 3 Encounters:   06/17/25 64.4 kg (141 lb 15.6 oz)   06/10/25 67.1 kg (148 lb)   05/28/25 83.6 kg (184 lb 4.9 oz)     Last echo in October 2024 showing LVEF 50%, mild AVR and a PAP of 37  BNP on admission 441  Not on diuretics at home  Holding home statin, lisinopril, atenolol

## 2025-06-17 NOTE — SEPSIS NOTE
"Sepsis Note   Gold Van 79 y.o. male MRN: [id number]  Unit/Bed#: ED 16 Encounter: [id number]       Initial Sepsis Screening       Row Name 06/17/25 1124                Is the patient's history suggestive of a new or worsening infection? Yes (Proceed)  -BS        Suspected source of infection wound infection  -BS        Indicate SIRS criteria Tachycardia > 90 bpm;Leukocytosis (WBC > 90862 IJL) OR Leukopenia (WBC <4000 IJL) OR Bandemia (WBC >10% bands)  -BS        Are two or more of the above signs & symptoms of infection both present and new to the patient? Yes (Proceed)  -BS        Assess for evidence of organ dysfunction: Are any of the below criteria present within 6 hours of suspected infection and SIRS criteria that are NOT considered to be chronic conditions? SBP < 90;MAP < 65;Lactate > 2.0  -BS        Date of presentation of severe sepsis 06/17/25  -BS        Time of presentation of severe sepsis 1045  -BS        Date of presentation of septic shock 06/17/25  -BS        Time of presentation of septic shock 1045  -BS        Fluid Resuscitation: 30 ml/kg IV fluid bolus will be given based on actual body weight  -BS        Is the patient persistently hypotensive in the hour after fluid bolus administration? If yes, patient meets criteria for vasopressor use. YES  -BS        Sepsis Note: Click \"NEXT\" below (NOT \"close\") to generate sepsis note based on above information. YES (proceed by clicking \"NEXT\")  -BS                  User Key  (r) = Recorded By, (t) = Taken By, (c) = Cosigned By      Initials Name Provider Type    BS Binh Heard PA-C Physician Assistant                   Initial Sepsis Screening       Row Name 06/17/25 1124                   Initial Sepsis Assessment    Is the patient's history suggestive of a new or worsening infection? Yes (Proceed)  -BS        Suspected source of infection wound infection  -BS        Indicate SIRS criteria Tachycardia > 90 bpm;Leukocytosis (WBC > 66601 IJL) " OR Leukopenia (WBC <4000 IJL) OR Bandemia (WBC >10% bands)  -BS        Are two or more of the above signs & symptoms of infection both present and new to the patient? Yes (Proceed)  -BS           If the answer is yes to both above questions, suspicion of sepsis is present. Proceed with algorithm to determine if severe sepsis or septic shock criteria are met.    Assess for evidence of organ dysfunction: Are any of the below criteria present within 6 hours of suspected infection and SIRS criteria that are NOT considered to be chronic conditions? SBP < 90;MAP < 65;Lactate > 2.0  -BS           Based on the above information, the patient meets criteria for SEVERE sepsis. CALL A SEPSIS ALERT. Use sepsis order set.     Date of presentation of severe sepsis 06/17/25  -BS        Time of presentation of severe sepsis 1045  -BS           Based on the above information, the patient meets criteria for SEPTIC SHOCK. CALL A SEPSIS ALERT. Use sepsis order set. If the lactate is > or equal to 4, or the SBP is <90, give 30 ml/kg cystalloid IV fluid bolus or document exclusion criteria.    Date of presentation of septic shock 06/17/25  -BS        Time of presentation of septic shock 1045  -BS        Fluid Resuscitation: 30 ml/kg IV fluid bolus will be given based on actual body weight  -BS        Is the patient persistently hypotensive in the hour after fluid bolus administration? If yes, patient meets criteria for vasopressor use. YES  -BS                  User Key  (r) = Recorded By, (t) = Taken By, (c) = Cosigned By      Initials Name Provider Type    BS Binh Heard PA-C Physician Assistant                         Body mass index is 20.07 kg/m².  Wt Readings from Last 1 Encounters:   06/17/25 67.1 kg (148 lb)     IBW (Ideal Body Weight): 77.6 kg    Ideal body weight: 77.6 kg (171 lb 1.2 oz)

## 2025-06-17 NOTE — SEPSIS NOTE
"Sepsis Note   Gold Van 79 y.o. male MRN: [id number]  Unit/Bed#: ICU 02-01 Encounter: [id number]       Initial Sepsis Screening       Row Name 06/17/25 1124                Is the patient's history suggestive of a new or worsening infection? Yes (Proceed)  -BS        Suspected source of infection wound infection  -BS        Indicate SIRS criteria Tachycardia > 90 bpm;Leukocytosis (WBC > 81875 IJL) OR Leukopenia (WBC <4000 IJL) OR Bandemia (WBC >10% bands)  -BS        Are two or more of the above signs & symptoms of infection both present and new to the patient? Yes (Proceed)  -BS        Assess for evidence of organ dysfunction: Are any of the below criteria present within 6 hours of suspected infection and SIRS criteria that are NOT considered to be chronic conditions? SBP < 90;MAP < 65;Lactate > 2.0  -BS        Date of presentation of severe sepsis 06/17/25  -BS        Time of presentation of severe sepsis 1045  -BS        Date of presentation of septic shock 06/17/25  -BS        Time of presentation of septic shock 1045  -BS        Fluid Resuscitation: 30 ml/kg IV fluid bolus will be given based on actual body weight  -BS        Is the patient persistently hypotensive in the hour after fluid bolus administration? If yes, patient meets criteria for vasopressor use. YES  -BS        Sepsis Note: Click \"NEXT\" below (NOT \"close\") to generate sepsis note based on above information. YES (proceed by clicking \"NEXT\")  -BS                  User Key  (r) = Recorded By, (t) = Taken By, (c) = Cosigned By      Initials Name Provider Type    BS Binh Heard PA-C Physician Assistant                   Initial Sepsis Screening       Row Name 06/17/25 1124                   Initial Sepsis Assessment    Is the patient's history suggestive of a new or worsening infection? Yes (Proceed)  -BS        Suspected source of infection wound infection  -BS        Indicate SIRS criteria Tachycardia > 90 bpm;Leukocytosis (WBC > 67745 " "IJL) OR Leukopenia (WBC <4000 IJL) OR Bandemia (WBC >10% bands)  -BS        Are two or more of the above signs & symptoms of infection both present and new to the patient? Yes (Proceed)  -BS           If the answer is yes to both above questions, suspicion of sepsis is present. Proceed with algorithm to determine if severe sepsis or septic shock criteria are met.    Assess for evidence of organ dysfunction: Are any of the below criteria present within 6 hours of suspected infection and SIRS criteria that are NOT considered to be chronic conditions? SBP < 90;MAP < 65;Lactate > 2.0  -BS           Based on the above information, the patient meets criteria for SEVERE sepsis. CALL A SEPSIS ALERT. Use sepsis order set.     Date of presentation of severe sepsis 06/17/25  -BS        Time of presentation of severe sepsis 1045  -BS           Based on the above information, the patient meets criteria for SEPTIC SHOCK. CALL A SEPSIS ALERT. Use sepsis order set. If the lactate is > or equal to 4, or the SBP is <90, give 30 ml/kg cystalloid IV fluid bolus or document exclusion criteria.    Date of presentation of septic shock 06/17/25  -BS        Time of presentation of septic shock 1045  -BS        Fluid Resuscitation: 30 ml/kg IV fluid bolus will be given based on actual body weight  -BS        Is the patient persistently hypotensive in the hour after fluid bolus administration? If yes, patient meets criteria for vasopressor use. YES  -BS                  User Key  (r) = Recorded By, (t) = Taken By, (c) = Cosigned By      Initials Name Provider Type    BS Binh Heard PA-C Physician Assistant                     Default Flowsheet Data (Last 720 Hours)       Sepsis Reassess       Row Name 06/17/25 1425                   Repeat Volume Status and Tissue Perfusion Assessment Performed    Date of Reassessment: 06/17/25  -RS        Time of Reassessment: 1325  -RS        Sepsis Reassessment Note: Click \"NEXT\" below (NOT \"close\") " "to generate sepsis reassessment note. YES (proceed by clicking \"NEXT\")  -RS        Repeat Volume Status and Tissue Perfusion Assessment Performed --                  User Key  (r) = Recorded By, (t) = Taken By, (c) = Cosigned By      Initials Name Provider Type    RS BELGICA Florian Nurse Practitioner                    Body mass index is 19.26 kg/m².  Wt Readings from Last 1 Encounters:   06/17/25 64.4 kg (141 lb 15.6 oz)     IBW (Ideal Body Weight): 77.6 kg    Ideal body weight: 77.6 kg (171 lb 1.2 oz)    "

## 2025-06-17 NOTE — ASSESSMENT & PLAN NOTE
Continue home Singulair and albuterol  Patient tolerating room air  Goal SpO2 greater than 92%  Incentive spirometry

## 2025-06-17 NOTE — ASSESSMENT & PLAN NOTE
Malnutrition Findings:                                 BMI Findings:           Body mass index is 19.26 kg/m².   Currently n.p.o.  Nutrition consult when appropriate

## 2025-06-17 NOTE — ASSESSMENT & PLAN NOTE
Pt was sent to the ED today from his Wound care appointment due to increased lethargy, hypotension and fever.   Patient with a history of chronic osteomyelitis of the right foot, status post surgical intervention in May this year: Rt partial 5th Ray resection (5/16) and Rt partial 4th Ray amputation and debridement of wound (5/23) with Dr. Phillips.   Post opt pt has been wheelchair bound due to NWB RLE and in a nursing home  In the ED pt had refractory hypotension despite 3 L IVFs and was started on vasopressors  Cultures obtained and started on Rocephin and vancomycin  PCT 14.9, LA 3.7, WBC 17 with 23% bands  Source: Rt foot osteomyelitis.   5/16 Surgical cultures (+) serratia marcescens, enterococcus faecalis, and pseudomonas aeruginosa  Plan:   Admit to ICU  Continue vasopressors and titrate for MAP greater than 65  Continue on vancomycin and Zosyn  Follow-up cultures  Started Solu-Cortef 50 Q6H and Florinef 0.05 daily  Close monitoring I/O status, renal indices, LA, fever curve and leukocytosis   Podiatry consulted, possible plan for surgical intervention later this week   MRI foot ordered

## 2025-06-17 NOTE — ASSESSMENT & PLAN NOTE
Recent surgical intervention on 9/13 right lower extremity endarterectomy with angioplasty, stenting, and bovine patch placement   Surgical site looks CDI, healing well  Holding home Plavix

## 2025-06-17 NOTE — H&P
"H&P - Critical Care/ICU   Name: Gold Van 79 y.o. male I MRN: [id number]  Unit/Bed#: ICU 02-01 I Date of Admission: 6/17/2025   Date of Service: 6/17/2025 I Hospital Day: 0       Assessment & Plan  Type 2 diabetes mellitus with complication, without long-term current use of insulin (HCC)  Lab Results   Component Value Date    HGBA1C 9.3 (H) 04/09/2025       No results for input(s): \"POCGLU\" in the last 72 hours.    Blood Sugar Average: Last 72 hrs:    History of poorly controlled diabetes and hyperglycemic on admission  Home regimen: Glipizide 10 mg twice daily, metformin 500 daily  Hold home medications and start subcu insulin every 6 hours while NPO  GERDA (acute kidney injury) (HCC)  On admission cr 1.49, baseline 0.75-0.90. 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   Patient with a history of chronic osteomyelitis of the right foot, status post surgical intervention in May this year: Rt partial 5th Ray resection (5/16) and Rt partial 4th Ray amputation and debridement of wound (5/23) with Dr. Phillips.   Post opt pt has been wheelchair bound due to NWB RLE and in a nursing home  In the ED pt had refractory hypotension despite 3 L IVFs and was started on vasopressors  Cultures obtained and started on Rocephin and vancomycin  PCT 14.9, LA 3.7, WBC 17 with 23% bands  Source: Rt foot osteomyelitis.   5/16 Surgical cultures (+) serratia marcescens, enterococcus faecalis, and pseudomonas aeruginosa  Plan:   Admit to ICU  Continue vasopressors and titrate for MAP greater than 65  Continue on vancomycin and Zosyn  Follow-up cultures  Started Solu-Cortef 50 Q6H and Florinef 0.05 daily  Close monitoring I/O status, renal " indices, LA, fever curve and leukocytosis   Podiatry consulted, possible plan for surgical intervention later this week   MRI foot ordered    Metabolic acidosis  On admission patient with GERDA, lactic acidosis of 3.7, and anion gap of 14 in the setting of septic shock  Received 3 L of fluids in the ER and started on vasopressors and antibiotics  Trend lactic acid  Maintain MAP greater than 65  Continue to correct underlying derangements and trend BMP and renal function  Mixed hyperlipidemia  Continue home statin  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   Currently n.p.o.  Nutrition consult when appropriate  Severe peripheral arterial disease ([facility])  Recent surgical intervention on 9/13 right lower extremity endarterectomy with angioplasty, stenting, and bovine patch placement   Surgical site looks CDI, healing well  Holding home Plavix  PAF (paroxysmal atrial fibrillation) ([facility])  Holding home atenolol and Eliquis due to hypotension and plan for surgery later this week  Starting heparin infusion ACS protocol  Continuous telemetry monitoring  Chronic heart failure with preserved ejection fraction (HFpEF) ([facility])  Wt Readings from Last 3 Encounters:   06/17/25 64.4 kg (141 lb 15.6 oz)   06/10/25 67.1 kg (148 lb)   05/28/25 83.6 kg (184 lb 4.9 oz)     Last echo in October 2024 showing LVEF 50%, mild AVR and a PAP of 37  BNP on admission 441  Not on diuretics at home  Holding home statin, lisinopril, atenolol        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 who presented to the ER today from his wound care appointment after being found lethargic, hypotensive and with fever.  Patient with a history significant for chronic right foot osteomyelitis which is status post amputations of the 4th and 5th toe in May of this year.  In the ER he was found to be in septic shock, metabolic acidosis, and GERDA.  He had refractory hypotension despite 3 L IV fluid and was started on vasopressors.  Cultures were obtained and he was started on vancomycin and Rocephin.  Pertinent labs: Lactic acid 3.7, leukocytosis 17 with 23% bands, procalcitonin 14, and anion gap 14.  Other past medical history: PAD, PAF on Eliquis, hypertension, hyperlipidemia, poorly controlled DM2, GERD, COPD/asthma.    History obtained from chart review, the patient, and spouse.  Review of Systems: See HPI for Review of Systems    Historical Information   Past Medical History:  No date: Asthma  No date: COVID-19  No date: CVA (cerebral vascular accident) (HCC)  No date: Diabetes mellitus (HCC)  No date: Environmental allergies  No date: Hyperlipidemia  No date: Hypertension  07/13/2020: Macular degeneration      Comment:  right eye  No date: Orthostatic hypotension  No date: Osteopenia  No date: Pneumonia  No date: Pneumothorax  No date: Sinusitis Past Surgical History:  08/2024: CARPAL TUNNEL RELEASE; Left  07/2024: CATARACT EXTRACTION; Left  No date: COLONOSCOPY  1964: KNEE CARTILAGE SURGERY; Right  05/16/2025: MN AMPUTATION METATARSAL W/TOE SINGLE; Right      Comment:  Procedure: RAY RESECTION FOOT - R partial 5th ray                resection;  Surgeon: Reinaldo Brewer DPM;  Location: [facility]                OR;  Service:  Podiatry  05/23/2025: OR AMPUTATION METATARSAL W/TOE SINGLE; Right      Comment:  Procedure: Right partial 4th ray amputation, wound                debridement;  Surgeon: Brandon Phillips DPM;                 Location: [facility];  Service: Podiatry  05/13/2025: OR TEAEC W/WO PATCH GRAFT COMMON FEMORAL; Right      Comment:  Procedure: Right common femoral endarterectomy with                bovine pericardial patch Right lower extremity angiogram                Third order cannulation of the right anterior tibial                artery Balloon angioplasty of the right anterior tibial                with a 3x220 [place] balloon DCB angioplasty of the SFA                with a 6x150 [place] balloon Stenting of the right SFA                with two 6x150 [place] stents, 6x5cm Viabahn, and a 7x5cm                viabahn Post-di  No date: VASECTOMY  No date: WISDOM TOOTH EXTRACTION      Comment:  x4   Current Outpatient Medications   Medication Instructions    albuterol (ACCUNEB) 1.25 mg, Nebulization, Every 6 hours PRN    albuterol (PROVENTIL HFA,VENTOLIN HFA) 90 mcg/act inhaler 1 puff, As needed    apixaban (ELIQUIS) 5 mg, 2 times daily    atenolol (TENORMIN) 25 mg, Oral, Daily    atorvastatin (LIPITOR) 40 mg, Oral, Every evening    Budeson-Glycopyrrol-Formoterol (Breztri Aerosphere) 160-9-4.8 MCG/ACT AERO 2 puffs, Oral, Every 12 hours    clopidogrel (PLAVIX) 75 mg, Oral, Daily    docusate sodium (COLACE) 100 mg, Oral, 2 times daily    famotidine (PEPCID) 20 mg, Oral, 2 times daily    glipiZIDE (GLUCOTROL XL) 10 mg, Oral, 2 times daily    lisinopril (ZESTRIL) 5 mg, Oral, Daily    LORazepam (ATIVAN) 0.5 mg, As needed    metFORMIN (GLUCOPHAGE-XR) 500 mg, Oral, Daily with dinner    montelukast (SINGULAIR) 10 mg, Oral, Daily at bedtime    Multiple Vitamins-Minerals (Multivitamin Adults) TABS 1 tablet, Oral, Daily    OneTouch Ultra test strip     oxyCODONE (ROXICODONE) 10 mg, Oral, Every 4 hours PRN, ** PAPER SCRIPT FOR  SNF USE ONLY **    polyethylene glycol (MIRALAX) 17 g, Oral, Daily PRN    predniSONE 5 mg, Oral, Daily    Tradjenta 5 mg, Daily with dinner    Allergies[1]   Social History[2] Family History[3]       Objective :                   Vitals I/O      Most Recent Min/Max in 24hrs   Temp 98.9 °F (37.2 °C) Temp  Min: 98.9 °F (37.2 °C)  Max: 100.2 °F (37.9 °C)   Pulse 78 Pulse  Min: 78  Max: 144   Resp 16 Resp  Min: 16  Max: 20   /56 BP  Min: 69/41  Max: 115/56   O2 Sat 98 % SpO2  Min: 95 %  Max: 98 %      Intake/Output Summary (Last 24 hours) at 6/17/2025 1613  Last data filed at 6/17/2025 1249  Gross per 24 hour   Intake 2258 ml   Output --   Net 2258 ml       Diet NPO; Sips of clear liquids    Invasive Monitoring           Physical Exam   Physical Exam  Vitals and nursing note reviewed.   Eyes:      Conjunctiva/sclera: Conjunctivae normal.      Pupils: Pupils are equal, round, and reactive to light.   Skin:     General: Skin is warm and dry.      Comments: Stage II sacral decub   HENT:      Head: Normocephalic and atraumatic.      Mouth/Throat:      Mouth: Mucous membranes are dry.   Cardiovascular:      Rate and Rhythm: Normal rate and regular rhythm.      Heart sounds: Normal heart sounds.   Musculoskeletal:         General: Amputation present.      Comments: Right 5th and 4th toe   Abdominal: General: Bowel sounds are normal.      Palpations: Abdomen is soft.      Tenderness: There is no abdominal tenderness.   Constitutional:       Appearance: He is ill-appearing.   Pulmonary:      Effort: Pulmonary effort is normal.      Breath sounds: Normal breath sounds.      Comments: Room air with SpO2 96%, lung sounds clear to auscultation bilateral  Neurological:      General: No focal deficit present.      Mental Status: He is oriented to person, place and time. He is calm.      GCS: GCS eye subscore is 3. GCS verbal subscore is 5. GCS motor subscore is 6.      Comments: Lethargic, opening eyes to voice and following  commands.  Answering all questions appropriately          Diagnostic Studies        Lab Results: I have reviewed the following results:     Medications:  Scheduled PRN   atorvastatin, 40 mg, QPM  budesonide-formoterol, 2 puff, BID  chlorhexidine, 15 mL, Q12H SANDRA  docusate sodium, 100 mg, BID  famotidine, 20 mg, Daily  fludrocortisone, 0.05 mg, Daily  hydrocortisone sodium succinate, 50 mg, Q6H SANDRA  insulin lispro, 1-5 Units, Q6H SANDRA  montelukast, 10 mg, HS  multivitamin-minerals, 1 tablet, Daily  piperacillin-tazobactam, 4.5 g, Once   Followed by  piperacillin-tazobactam, 4.5 g, Q8H  [START ON 6/18/2025] vancomycin, 15 mg/kg, Q24H      albuterol, 2.5 mg, Q6H PRN  polyethylene glycol, 17 g, Daily PRN       Continuous    heparin (porcine), 3-20 Units/kg/hr (Order-Specific), Last Rate: 12 Units/kg/hr (06/17/25 1527)  multi-electrolyte, 125 mL/hr, Last Rate: 125 mL/hr (06/17/25 1349)  norepinephrine, 1-30 mcg/min, Last Rate: 15 mcg/min (06/17/25 1252)         Labs:   CBC    Recent Labs     06/17/25  1112   WBC 17.93*   HGB 8.8*   HCT 28.4*      BANDSPCT 23*     BMP    Recent Labs     06/17/25  1112   SODIUM 133*   K 4.1   CL 99   CO2 20*   AGAP 14*   BUN 23   CREATININE 1.49*   CALCIUM 9.0       Coags    Recent Labs     06/17/25  1112 06/17/25  1523   INR 2.16* 2.44*   PTT 29 32        Additional Electrolytes  No recent results       Blood Gas    No recent results  Recent Labs     06/17/25  1259   PHVEN 7.376   IHD2CNX 29.1*   PO2VEN 50.7*   KSZ9DFJ 16.7*   BEVEN -7.6   O3AYDHR 81.4*    LFTs  Recent Labs     06/17/25  1112   ALT 12   AST 14   ALKPHOS 73   ALB 3.0*   TBILI 0.64       Infectious  Recent Labs     06/17/25  1112   PROCALCITONI 14.91*     Glucose  Recent Labs     06/17/25  1112   GLUC 237*        Administrative Statements          [1]   Allergies  Allergen Reactions    Ciprofloxacin Shortness Of Breath    Sulfamethoxazole Shortness Of Breath    Trimethoprim Shortness Of Breath    Dexamethasone Other  (See Comments)    Triamcinolone Other (See Comments)    Bactrim [Sulfamethoxazole-Trimethoprim] Fever     Fever of 107   [2]   Social History  Tobacco Use    Smoking status: Former    Smokeless tobacco: Never    Tobacco comments:     quit about 25 years ago   Vaping Use    Vaping status: Never Used   Substance Use Topics    Alcohol use: Never    Drug use: Never   [3]   Family History  Problem Relation Name Age of Onset    Asthma Mother      Emphysema Mother      Cancer Father      Asthma Brother      Cancer Brother

## 2025-06-17 NOTE — RESPIRATORY THERAPY NOTE
RT Protocol Note  Gold Van 79 y.o. 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  Patient is Critical care getting a line put in)    Vitals:  Blood pressure 90/54, pulse 89, temperature 99.4 °F (37.4 °C), temperature source Temporal, resp. rate 20, height 6' (1.829 m), weight 64.4 kg (141 lb 15.6 oz), SpO2 98%.          Imaging and other studies:           Plan    Respiratory Plan: Home Bronchodilator Patient pathway        Resp Comments: As per  patient med rec he takes 1.25 mg Albuterol PRN and albuterol MDI PRN. Will change patient treatments to a unit dose of albuterol Q 6 hours PRN and continue to monitor the patient.          [1]   Past Medical History:  Diagnosis Date    Asthma     COVID-19     CVA (cerebral vascular accident) (HCC)     Diabetes mellitus (HCC)     Environmental  allergies     Hyperlipidemia     Hypertension     Macular degeneration 07/13/2020    right eye    Orthostatic hypotension     Osteopenia     Pneumonia     Pneumothorax     Sinusitis    [2]   Social History  Socioeconomic History    Marital status: /Civil Union   Tobacco Use    Smoking status: Former    Smokeless tobacco: Never    Tobacco comments:     quit about 25 years ago   Vaping Use    Vaping status: Never Used   Substance and Sexual Activity    Alcohol use: Never    Drug use: Never    Sexual activity: Yes     Partners: Female   Social History Narrative    Daily caffeine use- 1 cup of tea, 2 cups of coffee     Social Drivers of Health     Food Insecurity: No Food Insecurity (6/17/2025)    Nursing - Inadequate Food Risk Classification     Worried About Running Out of Food in the Last Year: Never true     Ran Out of Food in the Last Year: Never true     Ran Out of Food in the Last Year: Never true   Transportation Needs: No Transportation Needs (6/17/2025)    Nursing - Transportation Risk Classification     Lack of Transportation: No    Received from [employer]    Social Connections   Intimate Partner Violence: Unknown (6/17/2025)    Nursing IPS     Physically Hurt by Someone: No     Hurt or Threatened by Someone: No   Housing Stability: Unknown (6/17/2025)    Nursing: Inadequate Housing Risk Classification     Unable to Pay for Housing in the Last Year: No     Has Housing: No

## 2025-06-17 NOTE — PROGRESS NOTES
Gold Van is a 79 y.o. male who is currently ordered Vancomycin IV with management by the Pharmacy Consult service.  Relevant clinical data and objective / subjective history reviewed.  Vancomycin Assessment:  Indication and Goal AUC/Trough: Bone/joint infection (goal -600, trough >10)  Clinical Status: worsening  Micro:   Cx pending  Renal Function:  SCr: 1.49 mg/dL  CrCl: 36.6 mL/min  Renal replacement: Not on dialysis  Days of Therapy: 1  Current Dose: new start  Vancomycin Plan:  New Dosin mg IV q24  Estimated AUC: 561 mcg*hr/mL  Estimated Trough: 16.2 mcg/mL  Next Level: 6/20 am  Renal Function Monitoring: Daily BMP and UOP  Pharmacy will continue to follow closely for s/sx of nephrotoxicity, infusion reactions and appropriateness of therapy.  BMP and CBC will be ordered per protocol. We will continue to follow the patient’s culture results and clinical progress daily.    Mateo Rubio, Pharmacist

## 2025-06-17 NOTE — TELEPHONE ENCOUNTER
Call placed to EMS at 1002 for patient.  Patient noted with BP 92/40, Heart rate is 144 Temp 99.6, patient c/o dizziness and nausea.  Dr Galeas aware and recommended the patient be sent to the ER for evaluation.  Wife Present in room.    EMS arrived at 1016 with the patient and transported to the [place] ER.    Call then placed to [facility] and spoke with Nursing Supervisor Janet.  Made aware of patient current status and that the patient was sent to the ER for evaluation.

## 2025-06-17 NOTE — RESPIRATORY THERAPY NOTE
RT Protocol Note  Gold Van 79 y.o. 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)    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 rate 16, height 6' (1.829 m), weight 64.4 kg (141 lb 15.6 oz), SpO2 98%.          Imaging and other studies:           Plan    Respiratory Plan: Home Bronchodilator Patient pathway        Resp Comments: Spoke with patient and he confirmed that he tales PRN albuterol nebs and Breztri at home. Will continue to monitor the patient.          [1]   Past Medical History:  Diagnosis Date    Asthma     COVID-19     CVA (cerebral vascular accident) (HCC)     Diabetes mellitus (HCC)     Environmental allergies     Hyperlipidemia     Hypertension     Macular degeneration 07/13/2020    right  eye    Orthostatic hypotension     Osteopenia     Pneumonia     Pneumothorax     Sinusitis    [2]   Social History  Socioeconomic History    Marital status: /Civil Union   Tobacco Use    Smoking status: Former    Smokeless tobacco: Never    Tobacco comments:     quit about 25 years ago   Vaping Use    Vaping status: Never Used   Substance and Sexual Activity    Alcohol use: Never    Drug use: Never    Sexual activity: Yes     Partners: Female   Social History Narrative    Daily caffeine use- 1 cup of tea, 2 cups of coffee     Social Drivers of Health     Food Insecurity: No Food Insecurity (6/17/2025)    Nursing - Inadequate Food Risk Classification     Worried About Running Out of Food in the Last Year: Never true     Ran Out of Food in the Last Year: Never true     Ran Out of Food in the Last Year: Never true   Transportation Needs: No Transportation Needs (6/17/2025)    Nursing - Transportation Risk Classification     Lack of Transportation: No    Received from [employer]    Social Connections   Intimate Partner Violence: Unknown (6/17/2025)    Nursing IPS     Physically Hurt by Someone: No     Hurt or Threatened by Someone: No   Housing Stability: Unknown (6/17/2025)    Nursing: Inadequate Housing Risk Classification     Unable to Pay for Housing in the Last Year: No     Has Housing: No

## 2025-06-17 NOTE — PROCEDURES
Central Line Insertion    Date/Time: 6/17/2025 2:25 PM    Performed by: Maged Garza MD PhD  Authorized by: Maged Garza MD PhD    Patient location:  ICU  Other Assisting Provider: No    Consent:     Consent obtained:  Written    Consent given by:  Patient    Risks discussed:  Incorrect placement, infection, bleeding and pneumothorax    Alternatives discussed:  No treatment  Universal protocol:     Procedure explained and questions answered to patient or proxy's satisfaction: yes      Relevant documents present and verified: yes      Test results available and properly labeled: yes      Immediately prior to procedure, a time out was called: yes      Patient identity confirmed:  Arm band, verbally with patient, hospital-assigned identification number and provided demographic data  Pre-procedure details:     Hand hygiene: Hand hygiene performed prior to insertion      Sterile barrier technique: All elements of maximal sterile technique followed      Skin preparation:  ChloraPrep    Skin preparation agent: Skin preparation agent completely dried prior to procedure    Indications:     Central line indications: medications requiring central line    Anesthesia (see MAR for exact dosages):     Anesthesia method:  Local infiltration    Local anesthetic:  Lidocaine 1% w/o epi  Procedure details:     Location:  Right internal jugular    Vessel type: vein      Laterality:  Right    Approach: percutaneous technique used      Patient position:  Flat    Catheter type:  Triple lumen    Catheter size:  7 Fr    Landmarks identified: yes      Ultrasound guidance: yes      Ultrasound image availability:  Still images obtained    Sterile ultrasound techniques: Sterile gel and sterile probe covers were used      Manometry confirmation: yes      Number of attempts:  1    Successful placement: yes      Catheter tip vessel location: atriocaval junction    Post-procedure details:     Post-procedure:  Dressing applied and  line sutured    Assessment:  Blood return through all ports, no pneumothorax on x-ray, free fluid flow and placement verified by x-ray    Post-procedure complications: none      Patient tolerance of procedure:  Tolerated well, no immediate complications

## 2025-06-17 NOTE — ASSESSMENT & PLAN NOTE
Stage II sacral pressure ulcer present on admission, picture in media  Patient recently wheelchair-bound due to nonweightbearing right lower extremity after surgery on 5/13  Wound care consulted  Offload pressure as able with frequent repositioning

## 2025-06-17 NOTE — ED ATTENDING ATTESTATION
6/17/2025  I, Anand Stevens Jr, DO, saw and evaluated the patient. I have discussed the patient with the resident/non-physician practitioner and agree with the resident's/non-physician practitioner's findings, Plan of Care, and MDM as documented in the resident's/non-physician practitioner's note, except where noted. All available labs and Radiology studies were reviewed.  I was present for key portions of any procedure(s) performed by the resident/non-physician practitioner and I was immediately available to provide assistance.       At this point I agree with the current assessment done in the Emergency Department.  I have conducted an independent evaluation of this patient a history and physical is as follows:    Patient is a 79-year-old male who presents to the emergency department from the podiatrist office secondary to weakness and hypotension.  The patient has a wound of the right leg as well as a wound of the foot that is having some active bleeding.  The patient had history of diabetic wounds requiring surgical intervention.    Heart is regular rate and rhythm without clicks rubs or gallops and lungs are coarse.  Abdomen is soft and nontender.  Patient has a wound of the right leg that appears to be accompanying the lateral aspect of the distal metatarsals with some active bleeding.      Patient is pale and cachectic in appearance.  Patient received IV antibiotics and IV fluids.  Patient appears septic.    ED Course         Critical Care Time  Procedures

## 2025-06-17 NOTE — ASSESSMENT & PLAN NOTE
On admission patient with GERDA, lactic acidosis of 3.7, and anion gap of 14 in the setting of septic shock  Received 3 L of fluids in the ER and started on vasopressors and antibiotics  Trend lactic acid  Maintain MAP greater than 65  Continue to correct underlying derangements and trend BMP and renal function

## 2025-06-17 NOTE — ED PROVIDER NOTES
Time reflects when diagnosis was documented in both MDM as applicable and the Disposition within this note       Time User Action Codes Description Comment    6/17/2025 12:06 PM Binh Heard Add [A41.9,  R65.21] Septic shock (HCC)     6/17/2025 12:06 PM Binh Heard Add [E11.621,  L97.419] Diabetic ulcer of right midfoot (HCC)     6/17/2025 12:07 PM Binh Heard Add [N17.9] GERDA (acute kidney injury) (Formerly [facility])     6/17/2025 12:18 PM Binh Heard Add [E87.20] Metabolic acidosis     6/17/2025 12:19 PM Binh Heard Add [E87.1] Hyponatremia     6/17/2025 12:21 PM Binh Heard Add [D64.9] Anemia     6/17/2025  1:41 PM Taylor Clark Add [M86.471] 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 (Formerly [facility])     6/17/2025  4:29 PM Taylor Clark Add [M86.9] Osteomyelitis (Formerly [facility])           ED Disposition       ED Disposition   Admit    Condition   Stable    Date/Time   Tue Jun 17, 2025 12:50 PM    Comment   Case was discussed with Dr. Garza and the patient's admission status was agreed to be Admission Status: inpatient status to the service of Dr. Garza.               Assessment & Plan       Medical Decision Making  Patient is an ill-appearing 79-year-old male with with history of chronic osteomyelitis of right foot presenting with severe sepsis. He has been dealing with fatigue and generalized weakness for the past couple days and saw wound care prior to arrival and he was lethargic, tachycardic, and near febrile so Dr. Galeas arranged 911 transportation to the hospital. He recommended workup for sepsis and admission to the hospital. Source being likely his foot. Sepsis alert initiated by myself due to tachycardia, borderline fever, and hypotension prehospital with suspected source being his right foot infection. See clinical image attached.  Plan is to obtain sepsis workup including labs and blood cultures. BNP due to history of CHF. Chest x-ray to evaluate for PNA/overload. He does not appear  overloaded on clinical exam. UA to evaluate for UTI. EKG to evaluate for arrhythmia. Will obtain x-ray of right foot and right tibia/fibula to evaluate for gas.  See ED course for interpretation of labs, imaging, and further medical decision making.   30 cc/kg IVF for severe sepsis bordering septic shock, reassess, and determine need for vasopressors. IV Rocephin and IV vancomycin for severe sepsis/soft tissue infx/acute on chronic osteomyelitis. Will give tylenol for fever/pain. He received 650 mg of tylenol prior to arrival. He remained hypotensive after the 2013 cc of IV fluids. Started Levophed for septic shock. He will require hospitalization to critical care. They evaluated him at the bedside and agree with the plan. He is FULL CODE and remains stable and mentating with the pressors in the emergency department.    Critical Care Time Statement: Upon my evaluation, this patient had a high probability of imminent or life-threatening deterioration due to septic shock, GERDA, which required my direct attention, intervention, and personal management. I spent a total of 50 minutes directly providing critical care services, including interpretation of complex medical databases, evaluating for the presence of life-threatening injuries or illnesses, management of organ system failure(s) , complex medical decision making (to support/prevent further life-threatening deterioration)., interpretation of hemodynamic data, titration of vasoactive medications, and titration of continuous IV medications (drips). This time is exclusive of procedures, teaching, treating other patients, family meetings, and any prior time recorded by providers other than myself.     Dispo: Patient hospitalized to inpatient critical care under the care of Dr. Garza for further workup and management.    Amount and/or Complexity of Data Reviewed  External Data Reviewed: labs, radiology and notes.  Labs: ordered. Decision-making details documented in  ED Course.  Radiology: ordered and independent interpretation performed.  ECG/medicine tests: ordered and independent interpretation performed.    Risk  OTC drugs.  Prescription drug management.  Decision regarding hospitalization.        ED Course as of 06/17/25 1810 Tue Jun 17, 2025   1058 Pulse(!): 114  Tachycardia, borderline fever, and soft BP. Sepsis alert called and order set used.   1117 Reassessed patient and he now has strong radial pulses.   1143 WBC(!): 17.93  Leukocytosis.   1144 Hemoglobin(!): 8.8   1148 Second liter of fluid complete. Repeat BP 81/51.  Confirmed by manual 82/54. Will start Levophed for septic shock and reach out to critical care. Discussed case with patient wife and he is FULL CODE.   1150 LACTIC ACID(!): 3.7   1152 BNP(!): 441   1155 Procalcitonin(!): 14.91   1200 Sodium(!): 133   1200 Carbon Dioxide(!): 20  Will obtain VBG.   1200 Creatinine(!): 1.49  GERDA. Hydrating.   1208 CC will be by soon.   1235 Critical care at the bedside. Full admit to their service.       Medications   NOREPINEPHRINE 4 MG  ML NSS (CMPD ORDER) infusion (15 mcg/min Intravenous Rate/Dose Change 6/17/25 1252)   acetaminophen (FOR EMS ONLY) (TYLENOL) oral suspension 650 mg (0 mg Does not apply Given to EMS 6/17/25 1053)   sodium chloride 0.9 % bolus 1,000 mL (0 mL Intravenous Stopped 6/17/25 1137)     Followed by   sodium chloride 0.9 % bolus 1,000 mL (0 mL Intravenous Stopped 6/17/25 1147)     Followed by   sodium chloride 0.9 % bolus 1,000 mL (0 mL Intravenous Stopped 6/17/25 1152)   ceftriaxone (ROCEPHIN) 2 g/50 mL in dextrose IVPB (0 mg Intravenous Stopped 6/17/25 1152)   vancomycin (VANCOCIN) IVPB (premix in dextrose) 1,000 mg 200 mL (0 mg Intravenous Stopped 6/17/25 1249)   acetaminophen (TYLENOL) tablet 325 mg (325 mg Oral Given 6/17/25 1127)       ED Risk Strat Scores                    No data recorded        SBIRT 20yo+      Flowsheet Row Most Recent Value   Initial Alcohol Screen: US AUDIT-C      1. How often do you have a drink containing alcohol? 0 Filed at: 06/17/2025 1056   2. How many drinks containing alcohol do you have on a typical day you are drinking?  0 Filed at: 06/17/2025 1056   3a. Male UNDER 65: How often do you have five or more drinks on one occasion? 0 Filed at: 06/17/2025 1056   Audit-C Score 0 Filed at: 06/17/2025 1056   IMTIAZ: How many times in the past year have you...    Used an illegal drug or used a prescription medication for non-medical reasons? Never Filed at: 06/17/2025 1056                            History of Present Illness       Chief Complaint   Patient presents with    Wound Check     According to the patient his is at [facility] for rehab and wound care.         Past Medical History[1]   Past Surgical History[2]   Family History[3]   Social History[4]   E-Cigarette/Vaping    E-Cigarette Use Never User       E-Cigarette/Vaping Substances    Nicotine No     THC No     CBD No     Flavoring No     Other No     Unknown No       I have reviewed and agree with the history as documented.     Patient is a 79-year-old male with relevant past medical history of asthma, COVID, CVA, diabetes mellitus, hyperlipidemia, hypertension, osteopenia, pneumonia, pneumothorax, sinusitis, and chronic osteomyelitis of right foot presenting via emergency medical services with severe sepsis. He has been dealing with worsening fatigue and generalized weakness for the past couple days and saw wound care prior to arrival and he was lethargic, tachycardic, hypotensive, and near febrile so Dr. Galeas arranged 911 transportation to the hospital. He recommended workup for sepsis and admission to the hospital. Source being likely his foot. He offers no other complaints besides right foot pain. He is alert and oriented in the emergency department. He denies fevers, chills, headache, dizziness, chest pain, shortness of breath, abdominal pain, nausea, vomiting, or urinary symptoms.      History provided by:   Patient and EMS personnel   used: No    Wound Check         Review of Systems   Constitutional:  Positive for fatigue. Negative for chills and fever.   HENT:  Negative for congestion, ear pain, rhinorrhea and sore throat.    Eyes:  Negative for pain and visual disturbance.   Respiratory:  Negative for cough and shortness of breath.    Cardiovascular:  Negative for chest pain and palpitations.   Gastrointestinal:  Negative for abdominal pain, constipation, diarrhea, nausea and vomiting.   Genitourinary:  Negative for dysuria, frequency, hematuria and urgency.   Musculoskeletal:  Negative for arthralgias and back pain.        Right foot pain.   Skin:  Negative for color change and rash.   Neurological:  Positive for weakness. Negative for dizziness, seizures, syncope, light-headedness and headaches.   Psychiatric/Behavioral:  Negative for agitation and confusion.            Objective       ED Triage Vitals   Temperature Pulse Blood Pressure Respirations SpO2 Patient Position - Orthostatic VS   06/17/25 1054 06/17/25 1054 06/17/25 1054 06/17/25 1054 06/17/25 1054 06/17/25 1054   100.2 °F (37.9 °C) (!) 114 92/54 20 98 % Lying      Temp Source Heart Rate Source BP Location FiO2 (%) Pain Score    06/17/25 1054 06/17/25 1100 06/17/25 1054 -- 06/17/25 1054    Temporal Monitor Right arm  No Pain      Vitals      Date and Time Temp Pulse SpO2 Resp BP Pain Score FACES Pain Rating User   06/17/25 1600 -- 78 98 % 16 115/56 -- -- [facility]   06/17/25 1500 98.9 °F (37.2 °C) 83 98 % 18 107/58 -- -- NA   06/17/25 1400 -- 86 97 % 20 91/50 No Pain -- [facility]   06/17/25 1336 99.4 °F (37.4 °C) 89 98 % 20 90/54 No Pain -- KF   06/17/25 1315 99.2 °F (37.3 °C) 91 96 % 20 99/58 -- -- KA   06/17/25 1310 -- 90 96 % -- 95/53 -- [place] [facility]   06/17/25 1305 -- 87 97 % -- 101/57 -- [place] [facility]   06/17/25 1300 -- 89 98 % -- 93/55 -- -- [facility]   06/17/25 1255 99.2 °F (37.3 °C) 90 96 % 20 95/50 -- -- [facility]   06/17/25 1252 -- 92 96 % -- 83/51 -- -- [facility]    06/17/25 1250 -- 91 95 % -- 90/51 -- -- [facility]   06/17/25 1245 -- 91 96 % -- 87/52 -- -- [facility] [place]   06/17/25 1240 -- 95 96 % -- 75/50 -- -- [facility] [place]   06/17/25 1235 -- 92 95 % -- 90/50 -- -- [facility] [place]   06/17/25 1230 -- 92 96 % -- 86/50 -- -- [facility] [place]   06/17/25 1226 -- 95 95 % -- 91/51 -- -- [facility] [place]   06/17/25 1215 99 °F (37.2 °C) 98 98 % 18 81/52 -- -- [facility] [place]   06/17/25 1214 -- 98 -- -- 81/52 -- -- [facility] [place]   06/17/25 1200 -- 101 97 % 18 79/47 -- -- [facility] [place]   06/17/25 1145 -- 105 97 % 20 81/51 -- -- [facility] [place]   06/17/25 1130 -- 110 97 % 20 79/41 -- -- [facility] [place]   06/17/25 1115 100 °F (37.8 °C) 110 97 % 20 69/41 -- -- [facility] [place]   06/17/25 1100 -- 116 97 % 20 82/43 -- -- [facility] [place]   06/17/25 1054 100.2 °F (37.9 °C) 114 98 % 20 92/54 No Pain -- [facility] [place]            Physical Exam  Vitals and nursing note reviewed.   Constitutional:       General: He is not in acute distress.     Appearance: He is underweight. He is ill-appearing and toxic-appearing.   HENT:      Head: Normocephalic and atraumatic.     Eyes:      Conjunctiva/sclera: Conjunctivae normal.       Cardiovascular:      Rate and Rhythm: Regular rhythm. Tachycardia present.      Heart sounds: No murmur heard.  Pulmonary:      Effort: Pulmonary effort is normal. No respiratory distress.      Breath sounds: Decreased breath sounds present. No wheezing, rhonchi or rales.   Abdominal:      Palpations: Abdomen is soft.      Tenderness: There is no abdominal tenderness. There is no guarding or rebound.     Musculoskeletal:         General: No swelling.      Cervical back: Neck supple.      Comments: Ulceration right mid shin with purulent drainage. See clinical image #2 attached.  Chronic wound right lateral midfoot. No streaking. Mild venous oozing. See clinical image #1 attached.     Skin:     General: Skin is warm and dry.      Capillary Refill: Capillary refill takes less than 2 seconds.      Coloration: Skin is pale.      Findings: Wound present.     Neurological:      General: No focal deficit present.      Mental Status: He  is alert and oriented to person, place, and time.      GCS: GCS eye subscore is 4. GCS verbal subscore is 5. GCS motor subscore is 6.      Sensory: Sensation is intact.      Motor: Motor function is intact.      Coordination: Coordination is intact.     Psychiatric:         Mood and Affect: Mood normal.           Image #1.    Image #2.    Results Reviewed       Procedure Component Value Units Date/Time    Lactic acid 2 Hours [[id number]]  (Abnormal) Collected: 06/17/25 1347    Lab Status: Final result Specimen: Blood from Arm, Left Updated: 06/17/25 1408     LACTIC ACID 3.7 mmol/L     Narrative:      Result may be elevated if tourniquet was used during collection.    Blood gas, venous [[id number]]  (Abnormal) Collected: 06/17/25 1259    Lab Status: Final result Specimen: Blood from Arm, Left Updated: 06/17/25 1305     pH, Yann 7.376     pCO2, Yann 29.1 mm Hg      pO2, Yann 50.7 mm Hg      HCO3, Yann 16.7 mmol/L      Base Excess, Yann -7.6 mmol/L      O2 Content, Yann 10.1 ml/dL      O2 HGB, VENOUS 81.4 %     Comprehensive metabolic panel [[id number]]  (Abnormal) Collected: 06/17/25 1112    Lab Status: Final result Specimen: Blood from Arm, Right Updated: 06/17/25 1156     Sodium 133 mmol/L      Potassium 4.1 mmol/L      Chloride 99 mmol/L      CO2 20 mmol/L      ANION GAP 14 mmol/L      BUN 23 mg/dL      Creatinine 1.49 mg/dL      Glucose 237 mg/dL      Calcium 9.0 mg/dL      Corrected Calcium 9.8 mg/dL      AST 14 U/L      ALT 12 U/L      Alkaline Phosphatase 73 U/L      Total Protein 6.6 g/dL      Albumin 3.0 g/dL      Total Bilirubin 0.64 mg/dL      eGFR 44 ml/min/1.73sq m     Narrative:      National Kid[employer] guidelines for Chronic Kidney Disease (CKD):     Stage 1 with normal or high GFR (GFR > 90 mL/min/1.73 square meters)    Stage 2 Mild CKD (GFR = 60-89 mL/min/1.73 square meters)    Stage 3A Moderate CKD (GFR = 45-59 mL/min/1.73 square meters)    Stage 3B Moderate CKD (GFR = 30-44 mL/min/1.73 square  meters)    Stage 4 Severe CKD (GFR = 15-29 mL/min/1.73 square meters)    Stage 5 End Stage CKD (GFR <15 mL/min/1.73 square meters)  Note: GFR calculation is accurate only with a steady state creatinine    Manual Differential(PHLEBS Do Not Order) [[id number]]  (Abnormal) Collected: 06/17/25 1112    Lab Status: Final result Specimen: Blood from Arm, Right Updated: 06/17/25 1156     Segmented % 73 %      Bands % 23 %      Lymphocytes % 0 %      Monocytes % 1 %      Eosinophils % 0 %      Basophils % 0 %      Metamyelocytes % 3 %      Absolute Neutrophils 17.21 Thousand/uL      Absolute Lymphocytes 0.00 Thousand/uL      Absolute Monocytes 0.18 Thousand/uL      Absolute Eosinophils 0.00 Thousand/uL      Absolute Basophils 0.00 Thousand/uL      Absolute Metamyelocytes 0.54 Thousand/uL      Total Counted --     RBC Morphology Present     Platelet Estimate Increased     Anisocytosis Present     Poikilocytes Present     Polychromasia Present    Procalcitonin [[id number]]  (Abnormal) Collected: 06/17/25 1112    Lab Status: Final result Specimen: Blood from Arm, Right Updated: 06/17/25 1152     Procalcitonin 14.91 ng/ml     B-Type Natriuretic Peptide(BNP) [[id number]]  (Abnormal) Collected: 06/17/25 1112    Lab Status: Final result Specimen: Blood from Arm, Right Updated: 06/17/25 1152      pg/mL     Lactic acid [[id number]]  (Abnormal) Collected: 06/17/25 1112    Lab Status: Final result Specimen: Blood from Arm, Right Updated: 06/17/25 1144     LACTIC ACID 3.7 mmol/L     Narrative:      Result may be elevated if tourniquet was used during collection.    APTT [[id number]]  (Normal) Collected: 06/17/25 1112    Lab Status: Final result Specimen: Blood from Arm, Right Updated: 06/17/25 1142     PTT 29 seconds     Protime-INR [[id number]]  (Abnormal) Collected: 06/17/25 1112    Lab Status: Final result Specimen: Blood from Arm, Right Updated: 06/17/25 1142     Protime 24.5 seconds      INR 2.16    Narrative:      INR Therapeutic  Range    Indication                                             INR Range      Atrial Fibrillation                                               2.0-3.0  Hypercoagulable State                                    2.0.2.3  Left Ventricular Asist Device                            2.0-3.0  Mechanical Heart Valve                                  -    Aortic(with afib, MI, embolism, HF, LA enlargement,    and/or coagulopathy)                                     2.0-3.0 (2.5-3.5)     Mitral                                                             2.5-3.5  Prosthetic/Bioprosthetic Heart Valve               2.0-3.0  Venous thromboembolism (VTE: VT, PE        2.0-3.0    CBC and differential [[id number]]  (Abnormal) Collected: 06/17/25 1112    Lab Status: Final result Specimen: Blood from Arm, Right Updated: 06/17/25 1129     WBC 17.93 Thousand/uL      RBC 3.14 Million/uL      Hemoglobin 8.8 g/dL      Hematocrit 28.4 %      MCV 90 fL      MCH 28.0 pg      MCHC 31.0 g/dL      RDW 14.6 %      MPV 9.3 fL      Platelets 361 Thousands/uL     Blood culture #2 [[id number]] Collected: 06/17/25 1112    Lab Status: In process Specimen: Blood from Arm, Left Updated: 06/17/25 1121    Blood culture #1 [[id number]] Collected: 06/17/25 1112    Lab Status: In process Specimen: Blood from Arm, Right Updated: 06/17/25 1121    UA w Reflex to Microscopic w Reflex to Culture [[id number]]     Lab Status: No result Specimen: Urine             XR tibia fibula 2 views RIGHT   ED Interpretation by Binh Heard PA-C (06/17 6704)   Naf.      Final Interpretation by Eleazar Coronado MD (06/17 5535)      Postop changes of the fourth and fifth metatarsals.      No x-ray evidence of osteomyelitis at this time.         Computerized Assisted Algorithm (CAA) may have been used to analyze all applicable images.         Workstation performed: [id number]         XR chest portable   ED Interpretation by Binh Heard PA-C (06/17 2934)   No acute disease.       Final Interpretation by Mazin Yeung MD (06/17 1511)      No acute cardiopulmonary disease.            Workstation performed: [id number]         XR foot 3+ views RIGHT   Final Interpretation by Eleazar Coronado MD (06/17 1514)      Postop changes of the fourth and fifth metatarsals.      No x-ray evidence of osteomyelitis at this time.         Computerized Assisted Algorithm (CAA) may have been used to analyze all applicable images.         Workstation performed: [id number]         MRI inpatient order    (Results Pending)   XR chest portable    (Results Pending)       ECG 12 Lead Documentation Only    Date/Time: 6/17/2025 10:55 AM    Performed by: Binh Heard PA-C  Authorized by: Binh Heard PA-C    Indications / Diagnosis:  Sepsis.  ECG reviewed by me, the ED Provider: yes    Patient location:  ED  Previous ECG:     Comparison to cardiac monitor: Yes    Interpretation:     Interpretation: abnormal    Rate:     ECG rate:  119  Rhythm:     Rhythm: sinus tachycardia    Ectopy:     Ectopy: PVCs    QRS:     QRS axis:  Normal    QRS intervals:  Normal  Conduction:     Conduction: normal    ST segments:     ST segments:  Non-specific  T waves:     T waves: non-specific        ED Medication and Procedure Management   Prior to Admission Medications   Prescriptions Last Dose Informant Patient Reported? Taking?   Budeson-Glycopyrrol-Formoterol (Breztri Aerosphere) 160-9-4.8 MCG/ACT AERO  Outside Facility (Specify) Yes No   Sig: Take 2 puffs by mouth in the morning and 2 puffs in the evening.   LORazepam (ATIVAN) 0.5 mg tablet  Outside Facility (Specify) Yes No   Sig: Take 0.5 mg by mouth if needed   Patient not taking: Reported on 6/10/2025   Multiple Vitamins-Minerals (Multivitamin Adults) TABS  Outside Facility (Specify) Yes No   Sig: Take 1 tablet by mouth in the morning.   OneTouch Ultra test strip  Outside Facility (Specify) Yes No   albuterol (ACCUNEB) 1.25 MG/3ML nebulizer solution   Outside Facility (Specify) Yes No   Sig: Take 1.25 mg by nebulization every 6 (six) hours as needed for wheezing   albuterol (PROVENTIL HFA,VENTOLIN HFA) 90 mcg/act inhaler  Outside Facility (Specify) Yes No   Si puff if needed   apixaban (ELIQUIS) 5 mg  Outside Facility (Specify) Yes No   Si mg in the morning and 5 mg in the evening.   atenolol (TENORMIN) 25 mg tablet  Outside Facility (Specify) Yes No   Sig: Take 25 mg by mouth in the morning.   atorvastatin (LIPITOR) 40 mg tablet  Outside Facility (Specify) No No   Sig: Take 1 tablet (40 mg total) by mouth every evening   clopidogrel (PLAVIX) 75 mg tablet  Outside Facility (Specify) No No   Sig: Take 1 tablet (75 mg total) by mouth daily   docusate sodium (COLACE) 100 mg capsule  Outside Facility (Specify) No No   Sig: Take 1 capsule (100 mg total) by mouth 2 (two) times a day   famotidine (PEPCID) 20 mg tablet  Outside Facility (Specify) No No   Sig: Take 1 tablet (20 mg total) by mouth 2 (two) times a day   glipiZIDE (GLUCOTROL XL) 10 mg 24 hr tablet  Outside Facility (Specify) Yes No   Sig: Take 10 mg by mouth in the morning and 10 mg before bedtime.   linaGLIPtin (Tradjenta) 5 MG TABS  Outside Facility (Specify) Yes No   Sig: Take 5 mg by mouth daily with dinner   Patient not taking: Reported on 6/10/2025   lisinopril (ZESTRIL) 5 mg tablet  Outside Facility (Specify) No No   Sig: Take 1 tablet (5 mg total) by mouth daily   metFORMIN (GLUCOPHAGE-XR) 500 mg 24 hr tablet  Outside Facility (Specify) Yes No   Sig: Take 500 mg by mouth daily with dinner   montelukast (SINGULAIR) 10 mg tablet  Outside Facility (Specify) No No   Sig: Take 1 tablet (10 mg total) by mouth daily at bedtime   oxyCODONE (ROXICODONE) 10 MG TABS  Outside Facility (Specify) No No   Sig: Take 1 tablet (10 mg total) by mouth every 4 (four) hours as needed for severe pain ** PAPER SCRIPT FOR SNF USE ONLY ** Max Daily Amount: 60 mg   polyethylene glycol (MIRALAX) 17 g packet  Outside  Facility (Specify) No No   Sig: Take 17 g by mouth daily as needed (constipation)   predniSONE 5 mg tablet  Outside Facility (Specify) Yes No   Sig: Take 1 tablet (5 mg total) by mouth in the morning.      Facility-Administered Medications: None     Current Discharge Medication List        CONTINUE these medications which have NOT CHANGED    Details   albuterol (ACCUNEB) 1.25 MG/3ML nebulizer solution Take 1.25 mg by nebulization every 6 (six) hours as needed for wheezing      albuterol (PROVENTIL HFA,VENTOLIN HFA) 90 mcg/act inhaler 1 puff if needed      apixaban (ELIQUIS) 5 mg 5 mg in the morning and 5 mg in the evening.      atenolol (TENORMIN) 25 mg tablet Take 25 mg by mouth in the morning.      atorvastatin (LIPITOR) 40 mg tablet Take 1 tablet (40 mg total) by mouth every evening  Qty: 30 tablet, Refills: 0    Associated Diagnoses: Cerebrovascular accident (CVA), unspecified mechanism (HCC)      Budeson-Glycopyrrol-Formoterol (Breztri Aerosphere) 160-9-4.8 MCG/ACT AERO Take 2 puffs by mouth in the morning and 2 puffs in the evening.      clopidogrel (PLAVIX) 75 mg tablet Take 1 tablet (75 mg total) by mouth daily  Qty: 90 tablet, Refills: 0    Associated Diagnoses: Peripheral arterial disease (HCC)      docusate sodium (COLACE) 100 mg capsule Take 1 capsule (100 mg total) by mouth 2 (two) times a day    Associated Diagnoses: Ulcer of right foot with fat layer exposed (HCC)      famotidine (PEPCID) 20 mg tablet Take 1 tablet (20 mg total) by mouth 2 (two) times a day    Associated Diagnoses: GERD (gastroesophageal reflux disease)      glipiZIDE (GLUCOTROL XL) 10 mg 24 hr tablet Take 10 mg by mouth in the morning and 10 mg before bedtime.      linaGLIPtin (Tradjenta) 5 MG TABS Take 5 mg by mouth daily with dinner      lisinopril (ZESTRIL) 5 mg tablet Take 1 tablet (5 mg total) by mouth daily    Associated Diagnoses: Essential hypertension      LORazepam (ATIVAN) 0.5 mg tablet Take 0.5 mg by mouth if needed       metFORMIN (GLUCOPHAGE-XR) 500 mg 24 hr tablet Take 500 mg by mouth daily with dinner      montelukast (SINGULAIR) 10 mg tablet Take 1 tablet (10 mg total) by mouth daily at bedtime    Associated Diagnoses: Asthma      Multiple Vitamins-Minerals (Multivitamin Adults) TABS Take 1 tablet by mouth in the morning.      OneTouch Ultra test strip       oxyCODONE (ROXICODONE) 10 MG TABS Take 1 tablet (10 mg total) by mouth every 4 (four) hours as needed for severe pain ** PAPER SCRIPT FOR SNF USE ONLY ** Max Daily Amount: 60 mg  Qty: 10 tablet, Refills: 0    Associated Diagnoses: Ulcer of right foot with fat layer exposed (HCC)      polyethylene glycol (MIRALAX) 17 g packet Take 17 g by mouth daily as needed (constipation)    Associated Diagnoses: Ulcer of right foot with fat layer exposed (HCC)      predniSONE 5 mg tablet Take 1 tablet (5 mg total) by mouth in the morning.    Associated Diagnoses: Generalized pruritus           No discharge procedures on file.  ED SEPSIS DOCUMENTATION   Time reflects when diagnosis was documented in both MDM as applicable and the Disposition within this note       Time User Action Codes Description Comment    6/17/2025 12:06 PM Binh Heard Add [A41.9,  R65.21] Septic shock (HCC)     6/17/2025 12:06 PM Binh Heard Add [E11.621,  L97.419] Diabetic ulcer of right midfoot (HCC)     6/17/2025 12:07 PM Binh Heard [N17.9] GERDA (acute kidney injury) (HCC)     6/17/2025 12:18 PM Binh Heard Add [E87.20] Metabolic acidosis     6/17/2025 12:19 PM Binh Heard [E87.1] Hyponatremia     6/17/2025 12:21 PM Binh Heard [D64.9] Anemia     6/17/2025  1:41 PM Taylor Clark Add [M86.471] 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 Yes (Proceed)  -BS        Suspected source of infection wound infection  -BS        Indicate SIRS criteria Tachycardia > 90 bpm;Leukocytosis (WBC > 98299 IJL) OR Leukopenia (WBC <4000 IJL) OR Bandemia (WBC >10% bands)  -BS        Are two or more of the above signs & symptoms of infection both present and new to the patient? Yes (Proceed)  -BS        Assess for evidence of organ dysfunction: Are any of the below criteria present within 6 hours of suspected infection and SIRS criteria that are NOT considered to be chronic conditions? SBP < 90;MAP < 65;Lactate > 2.0  -BS        Date of presentation of severe sepsis 06/17/25  -BS        Time of presentation of severe sepsis 1045  -BS        Date of presentation of septic shock 06/17/25  -BS        Time of presentation of septic shock 1045  -BS        Fluid Resuscitation: 30 ml/kg IV fluid bolus will be given based on actual body weight  -BS        Is the patient persistently hypotensive in the hour after fluid bolus administration? If yes, patient meets criteria for vasopressor use. YES  -BS        Sepsis Note: Click \"NEXT\" below (NOT \"close\") to generate sepsis note based on above information. YES (proceed by clicking \"NEXT\")  -BS                  User Key  (r) = Recorded By, (t) = Taken By, (c) = Cosigned By      Initials Name Provider Type    BS Binh Heard PA-C Physician Assistant                  Default Flowsheet Data (Last 720 Hours)       Sepsis Reassess       Row Name 06/17/25 1425                   Repeat Volume Status and Tissue Perfusion Assessment Performed    Date of Reassessment: 06/17/25  -RS        Time of Reassessment: 1325  -RS        Sepsis Reassessment Note: Click \"NEXT\" below (NOT \"close\") to generate sepsis reassessment note. YES (proceed by clicking \"NEXT\")  -RS        Repeat Volume Status and Tissue Perfusion Assessment Performed --                  User Key  (r) = Recorded By, (t) = Taken By, (c) = Cosigned By      Initials " Name Provider Type    RS BELGICA Florian Nurse Practitioner                         [1]   Past Medical History:  Diagnosis Date    Asthma     COVID-19     CVA (cerebral vascular accident) (HCC)     Diabetes mellitus (HCC)     Environmental allergies     Hyperlipidemia     Hypertension     Macular degeneration 07/13/2020    right eye    Orthostatic hypotension     Osteopenia     Pneumonia     Pneumothorax     Sinusitis    [2]   Past Surgical History:  Procedure Laterality Date    CARPAL TUNNEL RELEASE Left 08/2024    CATARACT EXTRACTION Left 07/2024    COLONOSCOPY      KNEE CARTILAGE SURGERY Right 1964    GA AMPUTATION METATARSAL W/TOE SINGLE Right 05/16/2025    Procedure: RAY RESECTION FOOT - R partial 5th ray resection;  Surgeon: Reinaldo Brewer DPM;  Location: [facility];  Service: Podiatry    GA AMPUTATION METATARSAL W/TOE SINGLE Right 05/23/2025    Procedure: Right partial 4th ray amputation, wound debridement;  Surgeon: Brandon Phillips DPM;  Location: [facility];  Service: Podiatry    GA TEAEC W/WO PATCH GRAFT COMMON FEMORAL Right 05/13/2025    Procedure: Right common femoral endarterectomy with bovine pericardial patch Right lower extremity angiogram Third order cannulation of the right anterior tibial artery Balloon angioplasty of the right anterior tibial with a 3x220 Fernando balloon DCB angioplasty of the SFA with a 6x150 Lyons balloon Stenting of the right SFA with two 6x150 Grazyna stents, 6x5cm Viabahn, and a 7x5cm viabahn Post-di    VASECTOMY      WISDOM TOOTH EXTRACTION      x4   [3]   Family History  Problem Relation Name Age of Onset    Asthma Mother      Emphysema Mother      Cancer Father      Asthma Brother      Cancer Brother     [4]   Social History  Tobacco Use    Smoking status: Former    Smokeless tobacco: Never    Tobacco comments:     quit about 25 years ago   Vaping Use    Vaping status: Never Used   Substance Use Topics    Alcohol use: Never    Drug use: Never        Binh A  RANGEL Heard  06/17/25 8317

## 2025-06-17 NOTE — PLAN OF CARE
Problem: Prexisting or High Potential for Compromised Skin Integrity  Goal: Skin integrity is maintained or improved  Description: INTERVENTIONS:  - Identify patients at risk for skin breakdown  - Assess and monitor skin integrity including under and around medical devices   - Assess and monitor nutrition and hydration status  - Monitor labs  - Assess for incontinence   - Turn and reposition patient  - Assist with mobility/ambulation  - Relieve pressure over joycelyn prominences   - Avoid friction and shearing  - Provide appropriate hygiene as needed including keeping skin clean and dry  - Evaluate need for skin moisturizer/barrier cream  - Collaborate with interdisciplinary team  - Patient/family teaching  - Consider wound care consult    Assess:  - Review Sae scale daily  - Clean and moisturize skin every shift  - Inspect skin when repositioning, toileting, and assisting with ADLS  - Assess under medical devices   - Assess extremities for adequate circulation and sensation     Bed Management:  - Have minimal linens on bed & keep smooth, unwrinkled  - Change linens as needed when moist or perspiring  - Avoid sitting or lying in one position for more than 2 hours while in bed?Keep HOB at 30 degrees   - Toileting:  - Offer bedside commode  - Assess for incontinence   - Use incontinent care products after each incontinent episode     Activity:  - Mobilize patient 3 times a day  - Encourage activity and walks on unit  - Encourage or provide ROM exercises   - Turn and reposition patient every 2 Hours  - Use appropriate equipment to lift or move patient in bed  - Instruct/ Assist with weight shifting every 60 when out of bed in chair  - Consider limitation of chair time 2 hour intervals    Skin Care:  - Avoid use of baby powder, tape, friction and shearing, hot water or constrictive clothing  - Relieve pressure over bony prominences using mepelex  - Do not massage red bony areas    Next Steps:  - Teach patient  strategies to minimize risks  - Consider consults to  interdisciplinary teams   Outcome: Progressing

## 2025-06-18 ENCOUNTER — APPOINTMENT (INPATIENT)
Dept: MRI IMAGING | Facility: HOSPITAL | Age: 80
DRG: 853 | End: 2025-06-18
Payer: COMMERCIAL

## 2025-06-18 PROBLEM — I27.20 PULMONARY HYPERTENSION (HCC): Chronic | Status: ACTIVE | Noted: 2025-05-14

## 2025-06-18 PROBLEM — R06.89 ACUTE RESPIRATORY INSUFFICIENCY: Status: RESOLVED | Noted: 2024-09-24 | Resolved: 2025-06-18

## 2025-06-18 PROBLEM — I48.0 PAF (PAROXYSMAL ATRIAL FIBRILLATION) (HCC): Chronic | Status: ACTIVE | Noted: 2025-04-23

## 2025-06-18 PROBLEM — I50.32 CHRONIC HEART FAILURE WITH PRESERVED EJECTION FRACTION (HFPEF) (HCC): Chronic | Status: ACTIVE | Noted: 2025-04-23

## 2025-06-18 PROBLEM — Z87.09 HISTORY OF PNEUMOTHORAX: Chronic | Status: ACTIVE | Noted: 2024-04-28

## 2025-06-18 PROBLEM — G62.9 NEUROPATHY: Chronic | Status: ACTIVE | Noted: 2024-10-04

## 2025-06-18 PROBLEM — I73.9 SEVERE PERIPHERAL ARTERIAL DISEASE (HCC): Chronic | Status: ACTIVE | Noted: 2024-11-26

## 2025-06-18 PROBLEM — Z74.09 IMPAIRED MOBILITY AND ACTIVITIES OF DAILY LIVING: Status: RESOLVED | Noted: 2024-10-04 | Resolved: 2025-06-18

## 2025-06-18 PROBLEM — J96.01 ACUTE RESPIRATORY FAILURE WITH HYPOXIA (HCC): Status: RESOLVED | Noted: 2024-04-28 | Resolved: 2025-06-18

## 2025-06-18 PROBLEM — I63.9 CVA (CEREBROVASCULAR ACCIDENT) (HCC): Chronic | Status: ACTIVE | Noted: 2024-10-02

## 2025-06-18 PROBLEM — R26.2 AMBULATORY DYSFUNCTION: Chronic | Status: ACTIVE | Noted: 2025-06-17

## 2025-06-18 PROBLEM — R79.89 TROPONIN I ABOVE REFERENCE RANGE: Status: RESOLVED | Noted: 2025-05-19 | Resolved: 2025-06-18

## 2025-06-18 PROBLEM — J44.89 COPD WITH ASTHMA (HCC): Chronic | Status: ACTIVE | Noted: 2024-04-29

## 2025-06-18 PROBLEM — R27.8 DYSMETRIA: Status: RESOLVED | Noted: 2024-10-01 | Resolved: 2025-06-18

## 2025-06-18 PROBLEM — J45.20 MILD INTERMITTENT ASTHMA WITHOUT COMPLICATION: Chronic | Status: ACTIVE | Noted: 2017-10-23

## 2025-06-18 PROBLEM — Z78.9 IMPAIRED MOBILITY AND ACTIVITIES OF DAILY LIVING: Status: RESOLVED | Noted: 2024-10-04 | Resolved: 2025-06-18

## 2025-06-18 PROBLEM — L89.159 PRESSURE INJURY OF SKIN OF SACRAL REGION: Chronic | Status: ACTIVE | Noted: 2025-06-17

## 2025-06-18 PROBLEM — K21.9 GASTROESOPHAGEAL REFLUX DISEASE WITHOUT ESOPHAGITIS: Chronic | Status: ACTIVE | Noted: 2024-10-04

## 2025-06-18 PROBLEM — L29.9 PRURITIC CONDITION: Chronic | Status: ACTIVE | Noted: 2024-03-18

## 2025-06-18 PROBLEM — R06.02 SHORTNESS OF BREATH: Status: RESOLVED | Noted: 2024-07-23 | Resolved: 2025-06-18

## 2025-06-18 PROBLEM — M86.471 CHRONIC OSTEOMYELITIS OF RIGHT FOOT WITH DRAINING SINUS (HCC): Chronic | Status: ACTIVE | Noted: 2025-04-28

## 2025-06-18 PROBLEM — M21.372 FOOT DROP, LEFT: Chronic | Status: ACTIVE | Noted: 2024-10-04

## 2025-06-18 PROBLEM — R93.1 ABNORMAL ECHOCARDIOGRAM: Status: RESOLVED | Noted: 2024-10-03 | Resolved: 2025-06-18

## 2025-06-18 PROBLEM — E87.70 VOLUME OVERLOAD: Status: RESOLVED | Noted: 2025-05-19 | Resolved: 2025-06-18

## 2025-06-18 PROBLEM — K40.20 NON-RECURRENT BILATERAL INGUINAL HERNIA WITHOUT OBSTRUCTION OR GANGRENE: Chronic | Status: ACTIVE | Noted: 2024-03-18

## 2025-06-18 PROBLEM — E78.2 MIXED HYPERLIPIDEMIA: Chronic | Status: ACTIVE | Noted: 2017-10-23

## 2025-06-18 PROBLEM — U07.1 COVID-19: Status: RESOLVED | Noted: 2024-09-24 | Resolved: 2025-06-18

## 2025-06-18 LAB
ABO GROUP BLD: NORMAL
ALBUMIN SERPL BCG-MCNC: 2.9 G/DL (ref 3.5–5)
ALP SERPL-CCNC: 43 U/L (ref 34–104)
ALT SERPL W P-5'-P-CCNC: 10 U/L (ref 7–52)
ANION GAP SERPL CALCULATED.3IONS-SCNC: 10 MMOL/L (ref 4–13)
ANION GAP SERPL CALCULATED.3IONS-SCNC: 7 MMOL/L (ref 4–13)
ANION GAP SERPL CALCULATED.3IONS-SCNC: 9 MMOL/L (ref 4–13)
APTT PPP: 39 SECONDS (ref 23–34)
APTT PPP: 43 SECONDS (ref 23–34)
APTT PPP: 46 SECONDS (ref 23–34)
AST SERPL W P-5'-P-CCNC: 16 U/L (ref 13–39)
BILIRUB SERPL-MCNC: 0.27 MG/DL (ref 0.2–1)
BLD GP AB SCN SERPL QL: POSITIVE
BLOOD GROUP ANTIBODIES SERPL: NORMAL
BUN SERPL-MCNC: 28 MG/DL (ref 5–25)
BUN SERPL-MCNC: 29 MG/DL (ref 5–25)
BUN SERPL-MCNC: 29 MG/DL (ref 5–25)
CA-I BLD-SCNC: 1.12 MMOL/L (ref 1.12–1.32)
CA-I BLD-SCNC: 1.14 MMOL/L (ref 1.12–1.32)
CALCIUM ALBUM COR SERPL-MCNC: 9.3 MG/DL (ref 8.3–10.1)
CALCIUM SERPL-MCNC: 8.1 MG/DL (ref 8.4–10.2)
CALCIUM SERPL-MCNC: 8.3 MG/DL (ref 8.4–10.2)
CALCIUM SERPL-MCNC: 8.4 MG/DL (ref 8.4–10.2)
CHLORIDE SERPL-SCNC: 105 MMOL/L (ref 96–108)
CHLORIDE SERPL-SCNC: 105 MMOL/L (ref 96–108)
CHLORIDE SERPL-SCNC: 107 MMOL/L (ref 96–108)
CK SERPL-CCNC: 101 U/L (ref 39–308)
CO2 SERPL-SCNC: 20 MMOL/L (ref 21–32)
CO2 SERPL-SCNC: 20 MMOL/L (ref 21–32)
CO2 SERPL-SCNC: 21 MMOL/L (ref 21–32)
CREAT SERPL-MCNC: 1.17 MG/DL (ref 0.6–1.3)
CREAT SERPL-MCNC: 1.26 MG/DL (ref 0.6–1.3)
CREAT SERPL-MCNC: 1.39 MG/DL (ref 0.6–1.3)
ERYTHROCYTE [DISTWIDTH] IN BLOOD BY AUTOMATED COUNT: 14.5 % (ref 11.6–15.1)
ERYTHROCYTE [DISTWIDTH] IN BLOOD BY AUTOMATED COUNT: 14.6 % (ref 11.6–15.1)
GFR SERPL CREATININE-BSD FRML MDRD: 47 ML/MIN/1.73SQ M
GFR SERPL CREATININE-BSD FRML MDRD: 53 ML/MIN/1.73SQ M
GFR SERPL CREATININE-BSD FRML MDRD: 58 ML/MIN/1.73SQ M
GLUCOSE SERPL-MCNC: 143 MG/DL (ref 65–140)
GLUCOSE SERPL-MCNC: 148 MG/DL (ref 65–140)
GLUCOSE SERPL-MCNC: 151 MG/DL (ref 65–140)
GLUCOSE SERPL-MCNC: 156 MG/DL (ref 65–140)
GLUCOSE SERPL-MCNC: 167 MG/DL (ref 65–140)
GLUCOSE SERPL-MCNC: 167 MG/DL (ref 65–140)
GLUCOSE SERPL-MCNC: 168 MG/DL (ref 65–140)
GLUCOSE SERPL-MCNC: 168 MG/DL (ref 65–140)
GLUCOSE SERPL-MCNC: 174 MG/DL (ref 65–140)
GLUCOSE SERPL-MCNC: 175 MG/DL (ref 65–140)
GLUCOSE SERPL-MCNC: 186 MG/DL (ref 65–140)
GLUCOSE SERPL-MCNC: 189 MG/DL (ref 65–140)
GLUCOSE SERPL-MCNC: 199 MG/DL (ref 65–140)
GLUCOSE SERPL-MCNC: 221 MG/DL (ref 65–140)
GLUCOSE SERPL-MCNC: 232 MG/DL (ref 65–140)
HCT VFR BLD AUTO: 19.5 % (ref 36.5–49.3)
HCT VFR BLD AUTO: 19.8 % (ref 36.5–49.3)
HCT VFR BLD AUTO: 23.4 % (ref 36.5–49.3)
HGB BLD-MCNC: 6.2 G/DL (ref 12–17)
HGB BLD-MCNC: 6.3 G/DL (ref 12–17)
HGB BLD-MCNC: 7.4 G/DL (ref 12–17)
INR PPP: 2.68 (ref 0.85–1.19)
LACTATE SERPL-SCNC: 1.6 MMOL/L (ref 0.5–2)
MAGNESIUM SERPL-MCNC: 2.5 MG/DL (ref 1.9–2.7)
MAGNESIUM SERPL-MCNC: 2.6 MG/DL (ref 1.9–2.7)
MCH RBC QN AUTO: 28.1 PG (ref 26.8–34.3)
MCH RBC QN AUTO: 28.3 PG (ref 26.8–34.3)
MCHC RBC AUTO-ENTMCNC: 31.8 G/DL (ref 31.4–37.4)
MCHC RBC AUTO-ENTMCNC: 31.8 G/DL (ref 31.4–37.4)
MCV RBC AUTO: 88 FL (ref 82–98)
MCV RBC AUTO: 89 FL (ref 82–98)
PHOSPHATE SERPL-MCNC: 2.1 MG/DL (ref 2.3–4.1)
PHOSPHATE SERPL-MCNC: 3.6 MG/DL (ref 2.3–4.1)
PLATELET # BLD AUTO: 223 THOUSANDS/UL (ref 149–390)
PLATELET # BLD AUTO: 231 THOUSANDS/UL (ref 149–390)
PMV BLD AUTO: 9 FL (ref 8.9–12.7)
PMV BLD AUTO: 9.5 FL (ref 8.9–12.7)
POTASSIUM SERPL-SCNC: 3.3 MMOL/L (ref 3.5–5.3)
POTASSIUM SERPL-SCNC: 3.3 MMOL/L (ref 3.5–5.3)
POTASSIUM SERPL-SCNC: 3.4 MMOL/L (ref 3.5–5.3)
PROCALCITONIN SERPL-MCNC: 27.46 NG/ML
PROT SERPL-MCNC: 5.6 G/DL (ref 6.4–8.4)
PROTHROMBIN TIME: 28.7 SECONDS (ref 12.3–15)
RBC # BLD AUTO: 2.19 MILLION/UL (ref 3.88–5.62)
RBC # BLD AUTO: 2.24 MILLION/UL (ref 3.88–5.62)
RH BLD: POSITIVE
SODIUM SERPL-SCNC: 134 MMOL/L (ref 135–147)
SODIUM SERPL-SCNC: 135 MMOL/L (ref 135–147)
SODIUM SERPL-SCNC: 135 MMOL/L (ref 135–147)
SPECIMEN EXPIRATION DATE: NORMAL
WBC # BLD AUTO: 10.01 THOUSAND/UL (ref 4.31–10.16)
WBC # BLD AUTO: 9.7 THOUSAND/UL (ref 4.31–10.16)

## 2025-06-18 PROCEDURE — 85027 COMPLETE CBC AUTOMATED: CPT | Performed by: NURSE PRACTITIONER

## 2025-06-18 PROCEDURE — 86850 RBC ANTIBODY SCREEN: CPT | Performed by: STUDENT IN AN ORGANIZED HEALTH CARE EDUCATION/TRAINING PROGRAM

## 2025-06-18 PROCEDURE — 83735 ASSAY OF MAGNESIUM: CPT | Performed by: NURSE PRACTITIONER

## 2025-06-18 PROCEDURE — 84100 ASSAY OF PHOSPHORUS: CPT | Performed by: NURSE PRACTITIONER

## 2025-06-18 PROCEDURE — 83605 ASSAY OF LACTIC ACID: CPT | Performed by: NURSE PRACTITIONER

## 2025-06-18 PROCEDURE — 86870 RBC ANTIBODY IDENTIFICATION: CPT | Performed by: STUDENT IN AN ORGANIZED HEALTH CARE EDUCATION/TRAINING PROGRAM

## 2025-06-18 PROCEDURE — 85014 HEMATOCRIT: CPT | Performed by: STUDENT IN AN ORGANIZED HEALTH CARE EDUCATION/TRAINING PROGRAM

## 2025-06-18 PROCEDURE — P9016 RBC LEUKOCYTES REDUCED: HCPCS

## 2025-06-18 PROCEDURE — 85018 HEMOGLOBIN: CPT | Performed by: STUDENT IN AN ORGANIZED HEALTH CARE EDUCATION/TRAINING PROGRAM

## 2025-06-18 PROCEDURE — 82948 REAGENT STRIP/BLOOD GLUCOSE: CPT

## 2025-06-18 PROCEDURE — 99233 SBSQ HOSP IP/OBS HIGH 50: CPT | Performed by: STUDENT IN AN ORGANIZED HEALTH CARE EDUCATION/TRAINING PROGRAM

## 2025-06-18 PROCEDURE — 30233N1 TRANSFUSION OF NONAUTOLOGOUS RED BLOOD CELLS INTO PERIPHERAL VEIN, PERCUTANEOUS APPROACH: ICD-10-PCS | Performed by: NURSE PRACTITIONER

## 2025-06-18 PROCEDURE — 94640 AIRWAY INHALATION TREATMENT: CPT

## 2025-06-18 PROCEDURE — 86900 BLOOD TYPING SEROLOGIC ABO: CPT | Performed by: STUDENT IN AN ORGANIZED HEALTH CARE EDUCATION/TRAINING PROGRAM

## 2025-06-18 PROCEDURE — 84145 PROCALCITONIN (PCT): CPT | Performed by: NURSE PRACTITIONER

## 2025-06-18 PROCEDURE — 73718 MRI LOWER EXTREMITY W/O DYE: CPT

## 2025-06-18 PROCEDURE — 86922 COMPATIBILITY TEST ANTIGLOB: CPT

## 2025-06-18 PROCEDURE — 94760 N-INVAS EAR/PLS OXIMETRY 1: CPT

## 2025-06-18 PROCEDURE — 85610 PROTHROMBIN TIME: CPT | Performed by: NURSE PRACTITIONER

## 2025-06-18 PROCEDURE — 83036 HEMOGLOBIN GLYCOSYLATED A1C: CPT | Performed by: NURSE PRACTITIONER

## 2025-06-18 PROCEDURE — 99024 POSTOP FOLLOW-UP VISIT: CPT | Performed by: PODIATRIST

## 2025-06-18 PROCEDURE — 85027 COMPLETE CBC AUTOMATED: CPT | Performed by: STUDENT IN AN ORGANIZED HEALTH CARE EDUCATION/TRAINING PROGRAM

## 2025-06-18 PROCEDURE — 86921 COMPATIBILITY TEST INCUBATE: CPT

## 2025-06-18 PROCEDURE — 80048 BASIC METABOLIC PNL TOTAL CA: CPT | Performed by: NURSE PRACTITIONER

## 2025-06-18 PROCEDURE — 80053 COMPREHEN METABOLIC PANEL: CPT | Performed by: NURSE PRACTITIONER

## 2025-06-18 PROCEDURE — NC001 PR NO CHARGE: Performed by: NURSE PRACTITIONER

## 2025-06-18 PROCEDURE — 86901 BLOOD TYPING SEROLOGIC RH(D): CPT | Performed by: STUDENT IN AN ORGANIZED HEALTH CARE EDUCATION/TRAINING PROGRAM

## 2025-06-18 PROCEDURE — 85730 THROMBOPLASTIN TIME PARTIAL: CPT | Performed by: STUDENT IN AN ORGANIZED HEALTH CARE EDUCATION/TRAINING PROGRAM

## 2025-06-18 PROCEDURE — 82330 ASSAY OF CALCIUM: CPT | Performed by: NURSE PRACTITIONER

## 2025-06-18 PROCEDURE — 82550 ASSAY OF CK (CPK): CPT | Performed by: NURSE PRACTITIONER

## 2025-06-18 RX ORDER — ALBUTEROL SULFATE 90 UG/1
2 INHALANT RESPIRATORY (INHALATION) EVERY 4 HOURS PRN
Status: DISCONTINUED | OUTPATIENT
Start: 2025-06-18 | End: 2025-06-18

## 2025-06-18 RX ORDER — POTASSIUM CHLORIDE 1500 MG/1
20 TABLET, EXTENDED RELEASE ORAL ONCE
Status: DISCONTINUED | OUTPATIENT
Start: 2025-06-18 | End: 2025-06-18

## 2025-06-18 RX ORDER — PREDNISONE 5 MG/1
5 TABLET ORAL DAILY
Status: DISCONTINUED | OUTPATIENT
Start: 2025-06-18 | End: 2025-07-05 | Stop reason: HOSPADM

## 2025-06-18 RX ORDER — ALBUTEROL SULFATE 90 UG/1
2 INHALANT RESPIRATORY (INHALATION) EVERY 4 HOURS PRN
Status: DISCONTINUED | OUTPATIENT
Start: 2025-06-18 | End: 2025-07-05 | Stop reason: HOSPADM

## 2025-06-18 RX ORDER — POTASSIUM CHLORIDE 1500 MG/1
40 TABLET, EXTENDED RELEASE ORAL ONCE
Status: COMPLETED | OUTPATIENT
Start: 2025-06-18 | End: 2025-06-18

## 2025-06-18 RX ADMIN — FAMOTIDINE 20 MG: 20 TABLET, FILM COATED ORAL at 08:05

## 2025-06-18 RX ADMIN — VANCOMYCIN HYDROCHLORIDE 1000 MG: 1 INJECTION, SOLUTION INTRAVENOUS at 10:22

## 2025-06-18 RX ADMIN — Medication 4.5 G: at 17:00

## 2025-06-18 RX ADMIN — ATORVASTATIN CALCIUM 40 MG: 40 TABLET, FILM COATED ORAL at 17:00

## 2025-06-18 RX ADMIN — SODIUM CHLORIDE 4 UNITS/HR: 9 INJECTION, SOLUTION INTRAVENOUS at 16:06

## 2025-06-18 RX ADMIN — Medication 1 TABLET: at 08:05

## 2025-06-18 RX ADMIN — HEPARIN SODIUM 22 UNITS/KG/HR: 10000 INJECTION, SOLUTION INTRAVENOUS at 16:58

## 2025-06-18 RX ADMIN — ALBUTEROL SULFATE 2.5 MG: 2.5 SOLUTION RESPIRATORY (INHALATION) at 04:00

## 2025-06-18 RX ADMIN — MONTELUKAST 10 MG: 10 TABLET, FILM COATED ORAL at 22:08

## 2025-06-18 RX ADMIN — POTASSIUM CHLORIDE 40 MEQ: 1500 TABLET, EXTENDED RELEASE ORAL at 13:28

## 2025-06-18 RX ADMIN — CHLORHEXIDINE GLUCONATE 15 ML: 1.2 SOLUTION ORAL at 08:05

## 2025-06-18 RX ADMIN — BUDESONIDE AND FORMOTEROL FUMARATE DIHYDRATE 2 PUFF: 160; 4.5 AEROSOL RESPIRATORY (INHALATION) at 08:05

## 2025-06-18 RX ADMIN — Medication 4.5 G: at 10:22

## 2025-06-18 RX ADMIN — BUDESONIDE, GLYCOPYRROLATE, AND FORMOTEROL FUMARATE 2 PUFF: 160; 9; 4.8 AEROSOL, METERED RESPIRATORY (INHALATION) at 21:19

## 2025-06-18 RX ADMIN — POTASSIUM CHLORIDE 40 MEQ: 1500 TABLET, EXTENDED RELEASE ORAL at 05:28

## 2025-06-18 RX ADMIN — PREDNISONE 5 MG: 5 TABLET ORAL at 12:00

## 2025-06-18 RX ADMIN — HYDROCORTISONE SODIUM SUCCINATE 50 MG: 100 INJECTION, POWDER, FOR SOLUTION INTRAMUSCULAR; INTRAVENOUS at 05:28

## 2025-06-18 RX ADMIN — FLUDROCORTISONE ACETATE 0.05 MG: 0.1 TABLET ORAL at 08:05

## 2025-06-18 RX ADMIN — Medication 4.5 G: at 01:51

## 2025-06-18 NOTE — ASSESSMENT & PLAN NOTE
Hold home atenolol with shock state resolving  Hold home Elquis--hep gtt for now with plan for OR friday

## 2025-06-18 NOTE — ASSESSMENT & PLAN NOTE
SHOCK STATE RESOLVED  Source: RLE osteo  Hx serratia/pseudomonas/e faecalis wound infection in past  BC P  MRI R foot-Postsurgical changes of partial fourth and fifth ray resections with a soft tissue ulcer overlying the cut surfaces of the fourth and fifth metatarsals and findings concerning for early osteomyelitis in the residual fourth metatarsal. No definite MRI evidence of osteomyelitis. The ulcer is also in close approximation to the distal third metatarsal, and early osteomyelitis in that location cannot be excluded. No evidence of an abscess.   Podiatry consulted  Shakila/Vanco D3  OR Friday with Podiatry

## 2025-06-18 NOTE — ASSESSMENT & PLAN NOTE
Lab Results   Component Value Date    EGFR 67 06/19/2025    EGFR 58 06/18/2025    EGFR 53 06/18/2025    CREATININE 1.04 06/19/2025    CREATININE 1.17 06/18/2025    CREATININE 1.26 06/18/2025   BAseline creat 0.8-1.1  UA 2+glucose 1+ protein 4-10 WBC  Ck 101  Likely prerenal in setting of shock state  Bmp in am   Results for orders placed or performed in visit on 01/29/21   AMB POC LEAD   Result Value Ref Range    Lead level (POC) <3.3 mcg/dL   AMB POC HEMOGLOBIN (HGB)   Result Value Ref Range    Hemoglobin (POC) 12.4    AMB POC MARINA INFLUENZA A/B TEST   Result Value Ref Range    VALID INTERNAL CONTROL POC Yes     Influenza A Ag POC Negative Negative    Influenza B Ag POC Negative Negative   POC RESPIRATORY SYNCYTIAL VIRUS   Result Value Ref Range    VALID INTERNAL CONTROL POC Yes     RSV (POC) Negative Negative

## 2025-06-18 NOTE — ASSESSMENT & PLAN NOTE
Baseline Hgb appears to fluctuate, but recently appears to be around 7-9  Would benefit from iron supplementation as iron sat low at 11 on recent labs hold with infection  Trend Hgb and transfuse for Hgb < 7

## 2025-06-18 NOTE — ASSESSMENT & PLAN NOTE
Currently in SR  Holding home Atenolol 2/2 hypotension  Holding home Eliquis 2/2 need for surgical intervention - continue Heparin gtt for now  Continue cardiac monitoring

## 2025-06-18 NOTE — PROGRESS NOTES
Progress Note - Critical Care/ICU   Name: Gold Van 79 y.o. male I MRN: [id number]  Unit/Bed#: ICU 02-01 I Date of Admission: 6/17/2025   Date of Service: 6/18/2025 I Hospital Day: 1       Critical Care Interval Transfer Note:    Brief Hospital Summary:  80 yo M with hx R osteo (serratia/pseudomonas/e faecalis) s/p 4th and 5th metatarsal amputations in May, PAD s/p R SFA stent may, PAF on eliquis, HTN, DM2 who presented with fevers found to be in septic shock requiring vasopressors. MRI with early osteo. Resuscitated and weaned off pressors 6/17. Podiatry consulted and plan for OR on Friday.     Barriers to discharge:   OR Friday with podiatry  D/c central line post OR Friday  Cultures pending for antibx plan     Consults: IP CONSULT TO PHARMACY  IP CONSULT TO PODIATRY  IP CONSULT TO CASE MANAGEMENT    Recommended to review admission imaging for incidental findings and document in discharge navigator: Chart reviewed, no known incidental findings noted at this time.      Discharge Plan: Anticipate discharge in >72 hrs to rehab facility.  Central access plan: d/c post OR       Patient seen and evaluated by Critical Care today and deemed to be appropriate for transfer to [facility] Spoke to To[facility] from [facility] to accept transfer. Critical care can be contacted via SecureChat with any questions or concerns. Please use the Critical Care AP Role in Secure Chat for any provider inquires until the patient is transferred out of the ICU or until tomorrow at 0600.    Septic shock (HCC)  SHOCK STATE RESOLVED  Source: RLE osteo  Hx serratia/pseudomonas/e faecalis wound infection in past  BC P  MRI R foot-Postsurgical changes of partial fourth and fifth ray resections with a soft tissue ulcer overlying the cut surfaces of the fourth and fifth metatarsals and findings concerning for early osteomyelitis in the residual fourth metatarsal. No definite MRI evidence of osteomyelitis. The ulcer is also in close approximation to the distal  third metatarsal, and early osteomyelitis in that location cannot be excluded. No evidence of an abscess.   Podiatry consulted  Zosyn/Vanco D2  OR Friday with Podiatry  Acute renal failure superimposed on stage 2 chronic kidney disease  ([facility])  Lab Results   Component Value Date    EGFR 53 06/18/2025    EGFR 47 06/18/2025    EGFR 41 06/17/2025    CREATININE 1.26 06/18/2025    CREATININE 1.39 (H) 06/18/2025    CREATININE 1.56 (H) 06/17/2025   BAseline creat 0.8-1.1  UA 2+glucose 1+ protein 4-10 WBC  Ck 101  Likely prerenal in setting of shock state  Bmp in am  Hyponatremia  Chronically 130-134  Stable  Bmp in am  Metabolic acidosis  Stable 20  LA cleared  Bmp in am  Type 2 diabetes mellitus with complication, without long-term current use of insulin ([facility])  Lab Results   Component Value Date    HGBA1C 9.3 (H) 04/09/2025       Recent Labs     06/18/25  0617 06/18/25  0815 06/18/25  1005 06/18/25  1205   POCGLU 148* 186* 221* 167*       Blood Sugar Average: Last 72 hrs:  (P) 223.125  Holding home glipizide and metformin  Insulin gtt  Moderate protein-calorie malnutrition ([facility])  Malnutrition Findings:        Nutrition following  Body mass index is 20.12 kg/m².     Pressure injury of skin of sacral region  Wound care  Essential hypertension  Holding home atenolol/lisinopril in post shock state  Mixed hyperlipidemia  Cont home statin  Pruritic condition  Home prednisone  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[facility])  Home inhaler   CVA (cerebrovascular accident) ([facility])  No residual deficits  Home statin  Holding plavix with OR friday  Gastroesophageal reflux disease without esophagitis  Home ppi  Severe peripheral arterial disease ([facility])  Hx R SFA stent 5/13  Hold plavix with OR friday  PAF (paroxysmal atrial fibrillation) ([facility])  Hold home atenolol with shock state resolving  Hold home Elquis--hep gtt for now with plan for OR friday  Anemia  Iron counts low and sat  Hold on Iron given septic state  1 UPRBC 6/18  Transfuse hgb <7  No s/s acute   bleeding --dilutional with volume resuscitation  Cbc in am  Ambulatory dysfunction  RLE NWB

## 2025-06-18 NOTE — WOUND OSTOMY CARE
Consult Note - Wound   Gold Van 79 y.o. male MRN: [id number]  Unit/Bed#: ICU 02-01 Encounter: [id number]        History and Present Illness:  Patient is a 78 yo male that was admitted to [facility] for treatment of septic shock. Patient has a PMH of chronic right foot osteomyelitis which is status post amputations of the 4th and 5th toe in May of this year . Patient is an assist x 2 for turning and repositioning. Patient is continent of bowel and bladder, however a smear of stool was noted on letty and continence care performed. On assessment, patient is lying in bed on low air loss mattress.     Wound Care was consulted for sacral decub    Podiatry consulted for and managing right foot. Patient stated the doctor looked at his foot and wrapped it, he didn't really want it unwrapped. As per podiatry notes plan for TMA with rotational flap on Friday.     Assessment Findings:   Bilateral elbows skin dry intact blanchable, left elbow with purple bruise discoloration. Patient stated he bangs his elbows a lot. Recommend silicone foam dressings.     POA bilateral sacro-buttocks: Wound is oval in shape, a mixture of pink  partial thickness skin loss on mid sacrum that is blanchable, and pink yellow partial thickness blanchable and non blanchable purple tissue. Miranda-wound is dry intact blanchable pink tissue. Etiology possibly a mixture of moisture friction and may have a pressure component now. Recommend Calazime cream.  Left pretibial skin tear: Wound is oval in shape, pink partial thickness skin loss with scant serosanguinous drainage.Miranda-wound is dry intact purple. Recommend Dermagran and silicone foam dressing.   MASD Bilateral groin, scrotum, tip of penis and upper thighs: Wounds are scattered pink partial thickness blanchable skin loss. No drainage noted at exam. Miranda-wound dry intact blanchable pink. Recommend Calazime cream.   Right pretibial wound Patient not sure how he got wound. Wound is oval in shape,  pink yellow adhered tissue, no drainage noted. Miranda-wound dry intact purple tissue. Recommend Dermagran and silicone foam dressing.      Educated patient on importance of frequent offloading of pressure via turning, repositioning and weight-shifting. Patient verbalized understanding of plan of care.     No induration, fluctuance, odor, warmth/temperature differences, redness, or purulence noted to the above noted wounds and skin areas assessed. New dressings applied per orders listed below. Patient tolerated well- no s/s of non-verbal pain or discomfort observed during the encounter. Bedside nurse aware of plan of care. See flow sheets for more detailed assessment findings.      Orders listed below and wound care will continue to follow, call or Secure Chat Message with questions.     Skin Care Plan:  1- Bilateral pretibial wounds: cleanse with VASHE, apply Dermagran to wound bed, cover with silicone foam dressing (Mepilex) ramone T for treatment, date. Change every other day and as needed if soiled/displaced.   2-Turn/reposition q2h or when medically stable for pressure re-distribution on skin, utilize ATR for turning and repositioning system, wedges for offloading.  3-Elevate heels to offload pressure, utilize pillows for offloading.  4-Moisturize skin daily with skin nourishing cream  5-Ehob cushion in chair when out of bed.  6- MASD/ITD groin, scrotum, upper thighs: Cleanse with soap and water, pat dry. Apply Calazime cream to wounds 2x/day and as needed if soiled.  7-Sacro-buttocks: Cleanse gently with soap and water, pat dry. Apply Calazime cream to wound 3x/day and as needed if soiled/episodes of incontinence.  8-Recommend patient follow up with out patient wound care referral for sacro-buttocks and right foot wounds.  9-Apply silicone bordered foam dressings (Mepilex) to b/l Heels and bilateral elbows. Ramone with P for Prevention and change every 3 days or PRN. Peel back and inspect Q-shift.Apply silicone  bordered foam dressings (Mepilex) to b/l Heels. Maged with P for Prevention and change every 3 days or PRN. Peel back and inspect Q-shift.  10- right foot managed by poditatry    Wounds:  Wound 05/13/25 Surgical Closed Surgical Incision Thigh Anterior;Proximal;Right (Active)   Wound Description Clean;Dry 06/18/25 0400       Wound 05/15/25 Irritant Contact Dermatitis Perspiration Sacrum Mid (Active)   Wound Image   06/18/25 1026   Wound Description Pink;Yellow 06/18/25 1026   Non-staged Wound Description Partial thickness 06/18/25 1026   Wound Length (cm) 3 cm 06/18/25 1026   Wound Width (cm) 5 cm 06/18/25 1026   Wound Depth (cm) 0.2 cm 06/18/25 1026   Wound Surface Area (cm^2) 11.78 cm^2 06/18/25 1026   Wound Volume (cm^3) 1.571 cm^3 06/18/25 1026   Calculated Wound Volume (cm^3) 3 cm^3 06/18/25 1026   Change in Wound Size % -689.47 06/18/25 1026   Drainage Amount None 06/18/25 1026   Miranda-wound Assessment Dry;Intact;Hyperpigmented;Purple 06/18/25 1026   Treatments Cleansed;Site care 06/18/25 1026   Dressing Protective barrier 06/18/25 1026   Wound packed? No 06/18/25 1026   Dressing Changed New 06/18/25 1026   Patient Tolerance Tolerated well 06/18/25 1026   Dressing Status Intact 06/18/25 1026       Wound 05/16/25 Traumatic Skin Tear Pretibial Left (Active)   Wound Image   06/18/25 1015   Wound Description Pink 06/18/25 1015   Non-staged Wound Description Partial thickness 06/18/25 1015   Wound Length (cm) 2.8 cm 06/18/25 1015   Wound Width (cm) 0.6 cm 06/18/25 1015   Wound Depth (cm) 0.2 cm 06/18/25 1015   Wound Surface Area (cm^2) 1.32 cm^2 06/18/25 1015   Wound Volume (cm^3) 0.176 cm^3 06/18/25 1015   Calculated Wound Volume (cm^3) 0.34 cm^3 06/18/25 1015   Change in Wound Size % -17.24 06/18/25 1015   Drainage Amount Scant 06/18/25 1015   Drainage Description Serosanguineous 06/18/25 1015   Miranda-wound Assessment Dry;Intact;Purple 06/18/25 1015   Treatments Cleansed;Site care 06/18/25 1015   Dressing Dermagran  gauze;Foam, Silicon (eg. Allevyn, etc) 06/18/25 1015   Wound packed? No 06/18/25 1015   Dressing Changed New 06/18/25 1015   Patient Tolerance Tolerated well 06/18/25 1015   Dressing Status Clean;Dry;Intact 06/18/25 1015       Wound 05/16/25 Diabetic Ulcer Foot Right;Lateral (Active)   Wound Image   06/17/25 1400   Wound Description GUILLE 06/18/25 0400   Dressing ABD 06/18/25 0400       Wound 06/18/25 Groin (Active)   Wound Image     06/18/25 1011   Wound Description Pink 06/18/25 1011   Non-staged Wound Description Partial thickness 06/18/25 1011   Wound Length (cm) 9 cm 06/18/25 1011   Wound Width (cm) 10 cm 06/18/25 1011   Wound Depth (cm) 0.1 cm 06/18/25 1011   Wound Surface Area (cm^2) 70.69 cm^2 06/18/25 1011   Wound Volume (cm^3) 4.712 cm^3 06/18/25 1011   Calculated Wound Volume (cm^3) 9 cm^3 06/18/25 1011   Drainage Amount None 06/18/25 1011   Miranda-wound Assessment Dry;Intact 06/18/25 1011   Treatments Cleansed;Site care 06/18/25 1011   Dressing Protective barrier 06/18/25 1011   Wound packed? No 06/18/25 1011   Dressing Changed New 06/18/25 1011   Patient Tolerance Tolerated well 06/18/25 1011   Dressing Status Intact 06/18/25 1011       Wound 06/18/25 Pretibial Right (Active)   Wound Image   06/18/25 1022   Wound Description Pink;Dry;Yellow 06/18/25 1022   Non-staged Wound Description Partial thickness 06/18/25 1022   Wound Length (cm) 0.9 cm 06/18/25 1022   Wound Width (cm) 0.7 cm 06/18/25 1022   Wound Depth (cm) 0.2 cm 06/18/25 1022   Wound Surface Area (cm^2) 0.49 cm^2 06/18/25 1022   Wound Volume (cm^3) 0.066 cm^3 06/18/25 1022   Calculated Wound Volume (cm^3) 0.13 cm^3 06/18/25 1022   Drainage Amount None 06/18/25 1022   Miranda-wound Assessment Dry;Intact;Hyperpigmented 06/18/25 1022   Treatments Cleansed;Site care 06/18/25 1022   Dressing Dermagran gauze;Foam, Silicon (eg. Allevyn, etc) 06/18/25 1022   Wound packed? No 06/18/25 1022   Dressing Changed New 06/18/25 1022   Patient Tolerance Tolerated well  06/18/25 1022   Dressing Status Clean;Dry;Intact 06/18/25 1022           Ailin Anguiano BSN, RN

## 2025-06-18 NOTE — ASSESSMENT & PLAN NOTE
Suspect this is 2/2 GERDA, lactic acidosis, dehydration  Is s/p volume resuscitation as noted above  Continue to trend for worsening acidosis

## 2025-06-18 NOTE — ASSESSMENT & PLAN NOTE
Holding home Plavix and Eliquis given need for surgical intervention - continue systemic AC w/Heparin gtt for now  Continue home statin  Frequent neuro checks

## 2025-06-18 NOTE — ASSESSMENT & PLAN NOTE
Baseline Hgb appears to fluctuate, but recently appears to be around 7-9  Would benefit from iron supplementation as iron sat low at 11 on recent labs  Trend Hgb and transfuse for Hgb < 7

## 2025-06-18 NOTE — ASSESSMENT & PLAN NOTE
[facility] to [facility] on 06[facility]17 given concern for R foot infection   Please see plan as noted above

## 2025-06-18 NOTE — ASSESSMENT & PLAN NOTE
Stage II sacral PI POA  Has been WCB 2/2 NWB to RLE post surgery  Wound care following  Offload pressure as able w/frequent repositioning

## 2025-06-18 NOTE — PLAN OF CARE
Problem: Prexisting or High Potential for Compromised Skin Integrity  Goal: Skin integrity is maintained or improved  Description: INTERVENTIONS:  - Identify patients at risk for skin breakdown  - Assess and monitor skin integrity including under and around medical devices   - Assess and monitor nutrition and hydration status  - Monitor labs  - Assess for incontinence   - Turn and reposition patient  - Assist with mobility/ambulation  - Relieve pressure over joycelyn prominences   - Avoid friction and shearing  - Provide appropriate hygiene as needed including keeping skin clean and dry  - Evaluate need for skin moisturizer/barrier cream  - Collaborate with interdisciplinary team  - Patient/family teaching  - Consider wound care consult    Assess:  - Review Sae scale daily  - Clean and moisturize skin every shift  - Inspect skin when repositioning, toileting, and assisting with ADLS  - Assess under medical devices   - Assess extremities for adequate circulation and sensation     Bed Management:  - Have minimal linens on bed & keep smooth, unwrinkled  - Change linens as needed when moist or perspiring  - Avoid sitting or lying in one position for more than 2 hours while in bed?Keep HOB at 30 degrees   - Toileting:  - Offer bedside commode  - Assess for incontinence   - Use incontinent care products after each incontinent episode     Activity:  - Mobilize patient 3 times a day  - Encourage activity and walks on unit  - Encourage or provide ROM exercises   - Turn and reposition patient every 2 Hours  - Use appropriate equipment to lift or move patient in bed  - Instruct/ Assist with weight shifting every 60 when out of bed in chair  - Consider limitation of chair time 2 hour intervals    Skin Care:  - Avoid use of baby powder, tape, friction and shearing, hot water or constrictive clothing  - Relieve pressure over bony prominences using mepelex  - Do not massage red bony areas    Next Steps:  - Teach patient  strategies to minimize risks  - Consider consults to  interdisciplinary teams   Outcome: Progressing     Problem: PAIN - ADULT  Goal: Verbalizes/displays adequate comfort level or baseline comfort level  Description: Interventions:  - Encourage patient to monitor pain and request assistance  - Assess pain using appropriate pain scale  - Administer analgesics as ordered based on type and severity of pain and evaluate response  - Implement non-pharmacological measures as appropriate and evaluate response  - Consider cultural and social influences on pain and pain management  - Notify physician/advanced practitioner if interventions unsuccessful or patient reports new pain  - Educate patient/family on pain management process including their role and importance of  reporting pain   - Provide non-pharmacologic/complimentary pain relief interventions  Outcome: Progressing     Problem: INFECTION - ADULT  Goal: Absence or prevention of progression during hospitalization  Description: INTERVENTIONS:  - Assess and monitor for signs and symptoms of infection  - Monitor lab/diagnostic results  - Monitor all insertion sites, i.e. indwelling lines, tubes, and drains  - Monitor endotracheal if appropriate and nasal secretions for changes in amount and color  - [place] appropriate cooling/warming therapies per order  - Administer medications as ordered  - Instruct and encourage patient and family to use good hand hygiene technique  - Identify and instruct in appropriate isolation precautions for identified infection/condition  Outcome: Progressing  Goal: Absence of fever/infection during neutropenic period  Description: INTERVENTIONS:  - Monitor WBC  - Perform strict hand hygiene  - Limit to healthy visitors only  - No plants, dried, fresh or silk flowers with otoole in patient room  Outcome: Progressing     Problem: SAFETY ADULT  Goal: Patient will remain free of falls  Description: INTERVENTIONS:  - Educate patient/family on  patient safety including physical limitations  - Instruct patient to call for assistance with activity   - Consider consulting OT/PT to assist with strengthening/mobility based on AM PAC & JH-HLM score  - Consult OT/PT to assist with strengthening/mobility   - Keep Call bell within reach  - Keep bed low and locked with side rails adjusted as appropriate  - Keep care items and personal belongings within reach  - Initiate and maintain comfort rounds  - Make Fall Risk Sign visible to staff  - Offer Toileting every  Hours, in advance of need  - Initiate/Maintain alarm  - Obtain necessary fall risk management equipment:   - Apply yellow socks and bracelet for high fall risk patients  - Consider moving patient to room near nurses station  Outcome: Progressing  Goal: Maintain or return to baseline ADL function  Description: INTERVENTIONS:  -  Assess patient's ability to carry out ADLs; assess patient's baseline for ADL function and identify physical deficits which impact ability to perform ADLs (bathing, care of mouth/teeth, toileting, grooming, dressing, etc.)  - Assess/evaluate cause of self-care deficits   - Assess range of motion  - Assess patient's mobility; develop plan if impaired  - Assess patient's need for assistive devices and provide as appropriate  - Encourage maximum independence but intervene and supervise when necessary  - Involve family in performance of ADLs  - Assess for home care needs following discharge   - Consider OT consult to assist with ADL evaluation and planning for discharge  - Provide patient education as appropriate  - Monitor functional capacity and physical performance, use of AM PAC & JH-HLM   - Monitor gait, balance and fatigue with ambulation    Outcome: Progressing  Goal: Maintains/Returns to pre admission functional level  Description: INTERVENTIONS:  - Perform AM-PAC 6 Click Basic Mobility/ Daily Activity assessment daily.  - Set and communicate daily mobility goal to care team and  patient/family/caregiver.   - Collaborate with rehabilitation services on mobility goals if consulted  - Perform Range of Motion  times a day.  - Reposition patient every  hours.  - Dangle patient  times a day  - Stand patient  times a day  - Ambulate patient  times a day  - Out of bed to chair  times a day   - Out of bed for meals  times a day  - Out of bed for toileting  - Record patient progress and toleration of activity level   Outcome: Progressing     Problem: DISCHARGE PLANNING  Goal: Discharge to home or other facility with appropriate resources  Description: INTERVENTIONS:  - Identify barriers to discharge w/patient and caregiver  - Arrange for needed discharge resources and transportation as appropriate  - Identify discharge learning needs (meds, wound care, etc.)  - Arrange for interpretive services to assist at discharge as needed  - Refer to Case Management Department for coordinating discharge planning if the patient needs post-hospital services based on physician/advanced practitioner order or complex needs related to functional status, cognitive ability, or social support system  Outcome: Progressing     Problem: Knowledge Deficit  Goal: Patient/family/caregiver demonstrates understanding of disease process, treatment plan, medications, and discharge instructions  Description: Complete learning assessment and assess knowledge base.  Interventions:  - Provide teaching at level of understanding  - Provide teaching via preferred learning methods  Outcome: Progressing     Problem: Nutrition/Hydration-ADULT  Goal: Nutrient/Hydration intake appropriate for improving, restoring or maintaining nutritional needs  Description: Monitor and assess patient's nutrition/hydration status for malnutrition. Collaborate with interdisciplinary team and initiate plan and interventions as ordered.  Monitor patient's weight and dietary intake as ordered or per policy. Utilize nutrition screening tool and intervene as  necessary. Determine patient's food preferences and provide high-protein, high-caloric foods as appropriate.     INTERVENTIONS:  - Monitor oral intake, urinary output, labs, and treatment plans  - Assess nutrition and hydration status and recommend course of action  - Evaluate amount of meals eaten  - Assist patient with eating if necessary   - Allow adequate time for meals  - Recommend/ encourage appropriate diets, oral nutritional supplements, and vitamin/mineral supplements  - Order, calculate, and assess calorie counts as needed  - Recommend, monitor, and adjust tube feedings and TPN/PPN based on assessed needs  - Assess need for intravenous fluids  - Provide specific nutrition/hydration education as appropriate  - Include patient/family/caregiver in decisions related to nutrition  Outcome: Progressing     Problem: CARDIOVASCULAR - ADULT  Goal: Maintains optimal cardiac output and hemodynamic stability  Description: INTERVENTIONS:  - Monitor I/O, vital signs and rhythm  - Monitor for S/S and trends of decreased cardiac output  - Administer and titrate ordered vasoactive medications to optimize hemodynamic stability  - Assess quality of pulses, skin color and temperature  - Assess for signs of decreased coronary artery perfusion  - Instruct patient to report change in severity of symptoms  Outcome: Progressing  Goal: Absence of cardiac dysrhythmias or at baseline rhythm  Description: INTERVENTIONS:  - Continuous cardiac monitoring, vital signs, obtain 12 lead EKG if ordered  - Administer antiarrhythmic and heart rate control medications as ordered  - Monitor electrolytes and administer replacement therapy as ordered  Outcome: Progressing     Problem: RESPIRATORY - ADULT  Goal: Achieves optimal ventilation and oxygenation  Description: INTERVENTIONS:  - Assess for changes in respiratory status  - Assess for changes in mentation and behavior  - Position to facilitate oxygenation and minimize respiratory effort  -  Oxygen administered by appropriate delivery if ordered  - Initiate smoking cessation education as indicated  - Encourage broncho-pulmonary hygiene including cough, deep breathe, Incentive Spirometry  - Assess the need for suctioning and aspirate as needed  - Assess and instruct to report SOB or any respiratory difficulty  - Respiratory Therapy support as indicated  Outcome: Progressing     Problem: GASTROINTESTINAL - ADULT  Goal: Minimal or absence of nausea and/or vomiting  Description: INTERVENTIONS:  - Administer IV fluids if ordered to ensure adequate hydration  - Maintain NPO status until nausea and vomiting are resolved  - Nasogastric tube if ordered  - Administer ordered antiemetic medications as needed  - Provide nonpharmacologic comfort measures as appropriate  - Advance diet as tolerated, if ordered  - Consider nutrition services referral to assist patient with adequate nutrition and appropriate food choices  Outcome: Progressing  Goal: Maintains or returns to baseline bowel function  Description: INTERVENTIONS:  - Assess bowel function  - Encourage oral fluids to ensure adequate hydration  - Administer IV fluids if ordered to ensure adequate hydration  - Administer ordered medications as needed  - Encourage mobilization and activity  - Consider nutritional services referral to assist patient with adequate nutrition and appropriate food choices  Outcome: Progressing  Goal: Maintains adequate nutritional intake  Description: INTERVENTIONS:  - Monitor percentage of each meal consumed  - Identify factors contributing to decreased intake, treat as appropriate  - Assist with meals as needed  - Monitor I&O, weight, and lab values if indicated  - Obtain nutrition services referral as needed  Outcome: Progressing  Goal: Establish and maintain optimal ostomy function  Description: INTERVENTIONS:  - Assess bowel function  - Encourage oral fluids to ensure adequate hydration  - Administer IV fluids if ordered to  ensure adequate hydration   - Administer ordered medications as needed  - Encourage mobilization and activity  - Nutrition services referral to assist patient with appropriate food choices  - Assess stoma site  - Consider wound care consult   Outcome: Progressing  Goal: Oral mucous membranes remain intact  Description: INTERVENTIONS  - Assess oral mucosa and hygiene practices  - Implement preventative oral hygiene regimen  - Implement oral medicated treatments as ordered  - Initiate Nutrition services referral as needed  Outcome: Progressing     Problem: GENITOURINARY - ADULT  Goal: Maintains or returns to baseline urinary function  Description: INTERVENTIONS:  - Assess urinary function  - Encourage oral fluids to ensure adequate hydration if ordered  - Administer IV fluids as ordered to ensure adequate hydration  - Administer ordered medications as needed  - Offer frequent toileting  - Follow urinary retention protocol if ordered  Outcome: Progressing  Goal: Absence of urinary retention  Description: INTERVENTIONS:  - Assess patient’s ability to void and empty bladder  - Monitor I/O  - Bladder scan as needed  - Discuss with physician/AP medications to alleviate retention as needed  - Discuss catheterization for long term situations as appropriate  Outcome: Progressing  Goal: Urinary catheter remains patent  Description: INTERVENTIONS:  - Assess patency of urinary catheter  - If patient has a chronic schmitt, consider changing catheter if non-functioning  - Follow guidelines for intermittent irrigation of non-functioning urinary catheter  Outcome: Progressing     Problem: METABOLIC, FLUID AND ELECTROLYTES - ADULT  Goal: Electrolytes maintained within normal limits  Description: INTERVENTIONS:  - Monitor labs and assess patient for signs and symptoms of electrolyte imbalances  - Administer electrolyte replacement as ordered  - Monitor response to electrolyte replacements, including repeat lab results as appropriate  -  Instruct patient on fluid and nutrition as appropriate  Outcome: Progressing  Goal: Fluid balance maintained  Description: INTERVENTIONS:  - Monitor labs   - Monitor I/O and WT  - Instruct patient on fluid and nutrition as appropriate  - Assess for signs & symptoms of volume excess or deficit  Outcome: Progressing  Goal: Glucose maintained within target range  Description: INTERVENTIONS:  - Monitor Blood Glucose as ordered  - Assess for signs and symptoms of hyperglycemia and hypoglycemia  - Administer ordered medications to maintain glucose within target range  - Assess nutritional intake and initiate nutrition service referral as needed  Outcome: Progressing     Problem: SKIN/TISSUE INTEGRITY - ADULT  Goal: Skin Integrity remains intact(Skin Breakdown Prevention)  Description: Assess:  -Perform Sae assessment every   -Clean and moisturize skin every   -Inspect skin when repositioning, toileting, and assisting with ADLS  -Assess under medical devices such as every   -Assess extremities for adequate circulation and sensation     Bed Management:  -Have minimal linens on bed & keep smooth, unwrinkled  -Change linens as needed when moist or perspiring  -Avoid sitting or lying in one position for more than  hours while in bed  -Keep HOB at degrees     Toileting:  -Offer bedside commode  -Assess for incontinence every   -Use incontinent care products after each incontinent episode such as     Activity:  -Mobilize patient  times a day  -Encourage activity and walks on unit  -Encourage or provide ROM exercises   -Turn and reposition patient every  Hours  -Use appropriate equipment to lift or move patient in bed  -Instruct/ Assist with weight shifting every  when out of bed in chair  -Consider limitation of chair time  hour intervals    Skin Care:  -Avoid use of baby powder, tape, friction and shearing, hot water or constrictive clothing  -Relieve pressure over bony prominences using   -Do not massage red bony  areas    Next Steps:  -Teach patient strategies to minimize risks such as    -Consider consults to  interdisciplinary teams such as   Outcome: Progressing  Goal: Incision(s), wounds(s) or drain site(s) healing without S/S of infection  Description: INTERVENTIONS  - Assess and document dressing, incision, wound bed, drain sites and surrounding tissue  - Provide patient and family education  - Perform skin care/dressing changes every  Outcome: Progressing  Goal: Pressure injury heals and does not worsen  Description: Interventions:  - Implement low air loss mattress or specialty surface (Criteria met)  - Apply silicone foam dressing  - Instruct/assist with weight shifting every  minutes when in chair   - Limit chair time to  hour intervals  - Use special pressure reducing interventions such as  when in chair   - Apply fecal or urinary incontinence containment device   - Perform passive or active ROM every   - Turn and reposition patient & offload bony prominences every  hours   - Utilize friction reducing device or surface for transfers   - Consider consults to  interdisciplinary teams such as   - Use incontinent care products after each incontinent episode such as   - Consider nutrition services referral as needed  Outcome: Progressing

## 2025-06-18 NOTE — PROGRESS NOTES
Gold Van is a 79 y.o. male who is currently ordered Vancomycin IV with management by the Pharmacy Consult service.  Relevant clinical data and objective / subjective history reviewed.  Vancomycin Assessment:  Indication and Goal AUC/Trough: Bone/joint infection (goal -600, trough >10)  Clinical Status: stable  Micro:     Renal Function:  SCr: 1.39 mg/dL  CrCl: 40.6 mL/min  Renal replacement: Not on dialysis  Days of Therapy: 2  Current Dose: 1000 mg iv q 24 hrs  Vancomycin Plan:  New Dosing: continue 1000 mg iv q 24 hrs  Estimated AUC: 485 mcg*hr/mL  Estimated Trough: 13.4 mcg/mL  Next Level: 6/20/25 @ 0600  Renal Function Monitoring: Daily BMP and UOP  Pharmacy will continue to follow closely for s/sx of nephrotoxicity, infusion reactions and appropriateness of therapy.  BMP and CBC will be ordered per protocol. We will continue to follow the patient’s culture results and clinical progress daily.    Abby Perry, Pharmacist

## 2025-06-18 NOTE — CASE MANAGEMENT
Case Management Assessment & Discharge Planning Note    Patient name Gold Van  Location ICU 02/ICU  MRN [id number]  : 1945 Date 2025       Current Admission Date: 2025  Current Admission Diagnosis:Septic shock ([facility])   Patient Active Problem List    Diagnosis Date Noted    Pressure injury of skin of sacral region 2025    Metabolic acidosis 2025    Ambulatory dysfunction 2025    Osteomyelitis ([facility]) 2025    Anemia 2025    Pulmonary hypertension ([facility]) 2025    Septic shock ([facility]) 2025    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 ([facility]) 2025    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 ([facility]) 2025    PAF (paroxysmal atrial fibrillation) ([facility]) 2025    Chronic heart failure with preserved ejection fraction (HFpEF) ([facility]) 2025    Ulcer of right foot with fat layer exposed secondary to osteomyelitis 2025    Severe peripheral arterial disease ([facility]) 2024    Moderate protein-calorie malnutrition ([facility]) 10/09/2024    Gastroesophageal reflux disease without esophagitis 10/04/2024    Neuropathy 10/04/2024    Foot drop, left 10/04/2024    CVA (cerebrovascular accident) ([facility]) 10/02/2024    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    History of pneumothorax 2024    Non-recurrent bilateral inguinal hernia without obstruction or gangrene 2024    Pruritic condition 2024    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 2021    Acute renal failure superimposed on stage 2 chronic kidney disease  ([facility]) 2021    Hyponatremia 2021    Lung nodule 2021    Type 2 diabetes mellitus with complication, without long-term current use of insulin ([facility]) 10/23/2017    Essential hypertension 10/23/2017    Mild intermittent asthma without complication 10/23/2017    Mixed hyperlipidemia 10/23/2017      LOS (days): 1  Geometric Mean LOS (GMLOS)  (days): 2  Days to GMLOS:1.1     OBJECTIVE:  PATIENT READMITTED TO HOSPITAL  Risk of Unplanned Readmission Score: 41.42         Current admission status: Inpatient       Preferred Pharmacy:   [facility] [place] - [address]  [address]  [place] [address]  Phone: [phone] Fax: [phone]    [email or web address] - [place], [place] - [address]  [address] [facility] [address]  Phone: [phone] Fax: [phone]    [facility] ([facility]) #[id number] [place] - [address] [address]  Phone: [phone] Fax: [phone]    Primary Care Provider: Shayne Diaz MD    Primary Insurance: [facility] REP  Secondary Insurance:     ASSESSMENT:  Active Health Care Proxies       Mya Van Health Care Representative - Spouse   Primary Phone: [phone] (Mobile)  Home Phone: [phone]                   Readmission Root Cause  30 Day Readmission: Yes  During your hospital stay, did someone (provider, nurse, ) explain your care to you in a way you could understand?:  (Pt reports he was transferred fomr [place])  During previous admission, was a post-acute recommendation made?: Yes  What post-acute resources were offered?: STR  Patient was readmitted due to: Hyponatremia  Action Plan: TBD    Patient Information  Admitted from:: Facility ([facility])  Mental Status: Alert  During Assessment patient was accompanied by: Not accompanied during assessment  Assessment information provided by:: Patient  Primary Caregiver: Spouse  Caregiver's Name:: Mya Van  Caregiver's Relationship to Patient:: Significant Other  Caregiver's Telephone Number:: [phone]  Support Systems: Spouse/significant other  County of Residence: [place]  What city do you live in[place]  Home entry access options. Select all that apply.: No steps to enter home  Type of Current Residence: [place]  Living  Arrangements: Lives w/ Spouse/significant other    Activities of Daily Living Prior to Admission  Functional Status: Assistance  Completes ADLs independently?: No  Level of ADL dependence: Assistance  Ambulates independently?: Yes  Does patient use assisted devices?: Yes  Assisted Devices (DME) used: Walker  Does patient currently own DME?: Yes  What DME does the patient currently own?: Straight Cane, Walker  Does patient have a history of Outpatient Therapy (PT/OT)?: Yes  Does the patient have a history of Short-Term Rehab?: Yes ([facility])  Does patient have a history of HHC?: No  Does patient currently have HHC?: No    Patient Information Continued  Income Source: Pension/snf  Does patient have prescription coverage?: Yes ([employer] [place])  Can the patient afford their medications and any related supplies (such as glucometers or test strips)?: Yes  Does patient receive dialysis treatments?: No  Does patient have a history of substance abuse?: No  Does patient have a history of Mental Health Diagnosis?: No    Means of Transportation  [facility]    DISCHARGE DETAILS:    Discharge planning discussed with:: Pt states he is having further foot surgery this week and does not want to go back to [facility].  [place] of Choice: Yes  Comments - Freedom of Choice: Will make referrals via [facility] for STR s/p toe amputations    Contacts  Patient Contacts: Mya Van  Relationship to Patient:: Family  Contact Method: Phone  Phone Number: [phone]  Reason/Outcome: Discharge Planning      Treatment Team Recommendation: Short Term Rehab  Expected Discharge Disposition: Skilled Nursing Facility  Pt was admitted from [facility] with sepsis and wound infection.  Pt had a podiatry cx and will be having a TMA later this week.  Pt does not want to go back to [facility].  Will make referrals via [facility] for STR S/P TMA.  Will need authorization for STR.

## 2025-06-18 NOTE — PHYSICAL THERAPY NOTE
PT cancel note       06/18/25 0755   PT Last Visit   PT Visit Date 06/18/25   Note Type   Note type Cancelled Session   Cancel Reasons Medical status   Additional Comments low Hbg     Leeanna Henao

## 2025-06-18 NOTE — ASSESSMENT & PLAN NOTE
Lab Results   Component Value Date    HGBA1C 9.3 (H) 04/09/2025       Recent Labs     06/19/25  0135 06/19/25  0356 06/19/25  0559 06/19/25  1057   POCGLU 144* 115 122 246*       Blood Sugar Average: Last 72 hrs:  (P) [id number]  Holding home glipizide and metformin  Insulin gtt

## 2025-06-18 NOTE — ASSESSMENT & PLAN NOTE
Creatinine 1.49 on admission - peaked at 1.56  Baseline creatinine appears to be around 0.7-0.9  Suspect this is 2/2 shock state, hypoperfusion, hypovolemia, ACE-I use  Is s/p aggressive volume resuscitation as noted above  Avoid nephrotoxic agents and further hypotension  Trend renal indices  Strict I/O - urinary retention protocol  Daily weights

## 2025-06-18 NOTE — UTILIZATION REVIEW
Initial Clinical Review    Admission: Date/Time/Statement:   Admission Orders (From admission, onward)       Ordered        06/17/25 1251  INPATIENT ADMISSION  Once                          Orders Placed This Encounter   Procedures    INPATIENT ADMISSION     Standing Status:   Standing     Number of Occurrences:   1     Level of Care:   Critical Care [15]     Estimated length of stay:   More than 2 Midnights     Certification:   I certify that inpatient services are medically necessary for this patient for a duration of greater than two midnights. See H&P and MD Progress Notes for additional information about the patient's course of treatment.     ED Arrival Information       Expected   -    Arrival   6/17/2025 10:44    Acuity   Urgent              Means of arrival   Ambulance    Escorted by   [facility]    Service   Critical Care/ICU    Admission type   Emergency              Arrival complaint   sepsis             Chief Complaint   Patient presents with    Wound Check     According to the patient his is at [facility] for rehab and wound care.         Initial Presentation: 79 y.o. 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 Admitted as Inpatient ICU level of carefor evaluation and treatment of  Septic Shock. Encephalopathy. Chronic RLE Osteomyelitis, Acute renal failure on CKD. Metabolic acidosis.     Presented w/ from his wound care appointment after being found lethargic, hypotensive and with fever. Patient with a history significant for chronic right foot osteomyelitis which is status post amputations of the 4th and 5th toe in May of this year. Pt found to be in septic shock, metabolic acidosis and GERDA. He had refractory hypotension despite 3 L IV fluid and was started on  vasopressors. Cultures were obtained and he was started on vancomycin and Rocephin. Pertinent labs: Lactic acid 3.7, leukocytosis 17 with 23% bands, procalcitonin 14, and anion gap 14. Mg 1.4, , H/H 8.8/28.4. On exam, GCS 14. Lethargic, opens eyes to voice and following commands. R 5th and 4th toe amputated. Lower extremities show wound dressing on the right foot is CDI. There is a small ulcer on the anterior shin. There is a stage 1 ulcer which is beginning to unroof on the patient's right buttocks; there is no purulence from this wound. RA 96%, lungs clear. Foot XR was notable for no free air. Please see below med list for meds given in the ED.     Plan: Admit to ICU, requiring active vasoactive agent titration, wean as tolerated. MAP >65 mmHg, Hold GDMT meds, Hold Antihypertensive meds,Hold Eliquis given likely goal for TMP, start heparin gtt. NPO for now until BP further stabilized in case urgent surgery/source control is needed Continue Statin,  IV Vanco/Ceftriaxone switched to Vanco/Zosyn given prior wound cultures w/ Serratia enterococcus faecalis and pseudomonas. F/U on cultures, MRI R foot, F/U labs in am. Podiatry consulted.    Date: 6/18/25   Day 2:   Levophed gtt was able to be weaned off overnight. Insulin gtt was started for hyperglycemia. Pt received additional 1L Isolyte as well as 5% Albumin 25 g x2.  BP stable 107/56 this am.  On exam R foot wound w/ dressing CDI. No focal deficit present. On RA @ 95%. Abnormal labs: K 3.3, CO2 20, Bun/creat 28/1.39, Procal 27.46, H/H 6.2/19.5. INR 2.68. Plan: Continue Zosyn and Vanco, F/U cultures. Monitor fever and WBC, R foot MRI pending. Continue SDS and Florinef for suspected adrenal insufficiency. Trend Creat, Avoid nephrotoxic agents and further hypotension, Continue to trend for worsening acidosis, Offload pressure as able w/ frequent repositioning. Wound care following. Continue insulin gtt- goal -180. Trend Hgb and transfuse for hgb <7.  Continue heparin gtt, frequent neurovascular checks. Hold Atenolol due to hypotension and Eliquis for surgical intervention,  Continue cardiac monitoring, Holding home ACE-I and BB 2/2 shock state. DC IV steroids and transition to Prednisone PO. Goal SpO2 = > 88%. Replete K, f/u labs in am.     Date: 6/19/25  Day 3: Has surpassed a 2nd midnight with active treatments and services.  Pt denies any new complaints. On exam, dressing CDI.  Labs K 3.2, Phos 2.1, H/H 7.1/22.5. Plan: Continue Stepdown Level 2. OR Friday for TMA. Continue Zosyn and Vanco, Continue Heparin gtt and Insulin gtt, Replete K and Phos, f/u labs in am.      Certification Statement: The patient will continue to require additional inpatient hospital stay due to plan OR for Friday 6/20     ED Treatment-Medication Administration from 06/17/2025 1044 to 06/17/2025 1336         Date/Time Order Dose Route Action     06/17/2025 1053 acetaminophen (FOR EMS ONLY) (TYLENOL) oral suspension 650 mg 0 mg Does not apply Given to EMS     06/17/2025 1106 sodium chloride 0.9 % bolus 1,000 mL 1,000 mL Intravenous New Bag     06/17/2025 1117 sodium chloride 0.9 % bolus 1,000 mL 1,000 mL Intravenous New Bag     06/17/2025 1146 sodium chloride 0.9 % bolus 1,000 mL 1,000 mL Intravenous New Bag     06/17/2025 1125 ceftriaxone (ROCEPHIN) 2 g/50 mL in dextrose IVPB 2,000 mg Intravenous New Bag     06/17/2025 1149 vancomycin (VANCOCIN) IVPB (premix in dextrose) 1,000 mg 200 mL 1,000 mg Intravenous New Bag     06/17/2025 1127 acetaminophen (TYLENOL) tablet 325 mg 325 mg Oral Given     06/17/2025 1209 NOREPINEPHRINE 4 MG  ML NSS (CMPD ORDER) infusion 5 mcg/min Intravenous New Bag     06/17/2025 1214 NOREPINEPHRINE 4 MG  ML NSS (CMPD ORDER) infusion 10 mcg/min Intravenous New Bag     06/17/2025 1226 NOREPINEPHRINE 4 MG  ML NSS (CMPD ORDER) infusion 12.5 mcg/min Intravenous Rate/Dose Change     06/17/2025 1252 NOREPINEPHRINE 4 MG  ML NSS (CMPD ORDER)  infusion 15 mcg/min Intravenous Rate/Dose Change            Scheduled Medications:  atorvastatin, 40 mg, Oral, QPM  budesonide-formoterol, 2 puff, Inhalation, BID  chlorhexidine, 15 mL, Mouth/Throat, Q12H SANDRA  famotidine, 20 mg, Oral, Daily  fludrocortisone, 0.05 mg, Oral, Daily  montelukast, 10 mg, Oral, HS  multivitamin-minerals, 1 tablet, Oral, Daily  piperacillin-tazobactam, 4.5 g, Intravenous, Q8H  senna-docusate sodium, 2 tablet, Oral, HS  albumin human (FLEXBUMIN) 5 % injection 25 g  Dose: 25 g  Freq: Once Route: IV  Last Dose: Stopped (06/18/25 0000)  Start: 06/17/25 2300 End: 06/18/25 0000  albumin human (FLEXBUMIN) 5 % injection 25 g  Dose: 25 g  Freq: Once Route: IV  Last Dose: Stopped (06/17/25 2000)  Start: 06/17/25 1915 End: 06/17/25 2122   calcium gluconate 2 g in sodium chloride 0.9% 100 mL (premix)  Dose: 2 g  Freq: Once Route: IV  Last Dose: Stopped (06/18/25 0200)  Start: 06/17/25 2300 End: 06/18/25 0200  fludrocortisone (FLORINEF) tablet 0.05 mg  Dose: 0.05 mg  Freq: Daily Route: PO  Start: 06/17/25 1400 End: 06/18/25 1119  hydrocortisone (Solu-CORTEF) injection 50 mg  Dose: 50 mg  Freq: Every 6 hours scheduled Route: IV  Start: 06/17/25 1400 End: 06/18/25 1119  magnesium sulfate 2 g/50 mL IVPB (premix) 2 g  Dose: 2 g  Freq: Once Route: IV  Last Dose: Stopped (06/18/25 0400)  Start: 06/17/25 2300 End: 06/18/25 0400  multi-electrolyte (Plasmalyte-A/Isolyte-S PH 7.4/Normosol-R) IV bolus 1,000 mL  Dose: 1,000 mL  Freq: Once Route: IV  Last Dose: Stopped (06/17/25 2000)  Start: 06/17/25 1815 End: 06/17/25 2000  piperacillin-tazobactam (ZOSYN) 4.5 g in sodium chloride 0.9 % 100 mL IV LOADING DOSE  Dose: 4.5 g  Freq: Once Route: IV  Last Dose: 4.5 g (06/17/25 1600)  Start: 06/17/25 1400 End: 06/17/25 1630  potassium chloride (Klor-Con M20) CR tablet 40 mEq  Dose: 40 mEq  Freq: Once Route: PO  Start: 06/18/25 0515 End: 06/18/25 0528  predniSONE tablet 5 mg  Dose: 5 mg  Freq: Daily Route: PO  Start:  06/18/25 1130  heparin (porcine) injection 7,500 Units  Dose: 7,500 Units  Freq: Once Route: IV  Start: 06/19/25 1215 End: 06/19/25 1233     potassium chloride (Klor-Con M20) CR tablet 40 mEq  Dose: 40 mEq  Freq: Once Route: PO  Start: 06/19/25 0800 End: 06/19/25 0835  potassium chloride 20 mEq IVPB (premix)  Dose: 20 mEq  Freq: Once Route: IV  Last Dose: 20 mEq (06/19/25 0322)  Start: 06/19/25 0200 End: 06/19/25 0422  potassium chloride 20 mEq IVPB (premix)  Dose: 20 mEq  Freq: Once Route: IV  Last Dose: 20 mEq (06/19/25 0438)  Start: 06/19/25 0300 End: 06/19/25 0538  sodium phosphate 12 mmol in sodium chloride 0.9 % 100 mL Infusion  Dose: 12 mmol  Freq: Once Route: IV  Last Dose: 12 mmol (06/19/25 0340)  Start: 06/19/25 0200 End: 06/19/25 0540  vancomycin (VANCOCIN) IVPB (premix in dextrose) 1,000 mg 200 mL  Dose: 15 mg/kg  Weight Dosing Info: 64.4 kg  Freq: Every 24 hours Route: IV  Start: 06/18/25 1100 End: 06/19/25 0821  vancomycin (VANCOCIN) 1,250 mg in sodium chloride 0.9 % 250 mL IVPB  Dose: 1,250 mg  Freq: Every 24 hours Route: IV  Last Dose: 1,250 mg (06/19/25 1131)  Start: 06/19/25 1100    Continuous IV Infusions:  heparin (porcine), 3-20 Units/kg/hr (Order-Specific), Intravenous, Titrated  insulin regular (HumuLIN R,NovoLIN R) 1 Units/mL in sodium chloride 0.9 % 100 mL infusion, 0.3-21 Units/hr, Intravenous, Titrated  multi-electrolyte (Plasmalyte-A/Isolyte-S PH 7.4/Normosol-R) IV solution  Rate: 75 mL/hr Dose: 75 mL/hr  Freq: Continuous Route: IV  Last Dose: Stopped (06/18/25 0649)  Start: 06/17/25 1345 End: 06/18/25 0647  NOREPINEPHRINE 4 MG  ML NSS (CMPD ORDER) infusion  Rate: 3.8-112.5 mL/hr Dose: 1-30 mcg/min  Freq: Titrated Route: IV  Last Dose: Stopped (06/17/25 0970)  Start: 06/17/25 1200 End: 06/18/25 0511    PRN Meds:  acetaminophen, 650 mg, Oral, Q6H PRN  albuterol, 2 puff, Inhalation, Q4H PRN  albuterol, 2.5 mg, Nebulization, Q6H PRN- given x1 6/18  ondansetron, 4 mg, Intravenous, Q6H  PRN  polyethylene glycol, 17 g, Oral, Daily PRN      ED Triage Vitals [06/17/25 1054]   Temperature Pulse Respirations Blood Pressure SpO2 Pain Score   100.2 °F (37.9 °C) (!) 114 20 92/54 98 % No Pain     Weight (last 2 days)       Date/Time Weight    06/19/25 0625 66.4 (146.39)    06/18/25 0600 67.3 (148.37)    06/17/25 1336 64.4 (141.98)    06/17/25 1054 67.1 (148)            Vital Signs (last 3 days)       Date/Time Temp Pulse Resp BP MAP (mmHg) SpO2 O2 Device Patient Position - Orthostatic VS [place] Coma Scale Score Pain    06/19/25 1514 97.6 °F (36.4 °C) 86 22 126/60 86 98 % None (Room air) Sitting -- --    06/19/25 1500 -- 81 24 -- -- 98 % -- -- -- --    06/19/25 1400 -- 86 29 -- -- 97 % -- -- -- --    06/19/25 1300 -- 85 28 -- -- 98 % -- -- -- --    06/19/25 1200 -- 96 39 -- -- 97 % -- -- -- --    06/19/25 1100 -- 85 20 -- -- 97 % -- -- -- --    06/19/25 1000 -- 91 27 -- -- 96 % -- -- -- No Pain    06/19/25 0900 -- 90 29 -- -- 96 % -- -- -- --    06/19/25 0800 -- 82 18 137/63 91 96 % -- -- 15 --    06/19/25 0738 -- -- -- -- -- 94 % None (Room air) -- -- --    06/19/25 0709 97.9 °F (36.6 °C) 81 19 119/58 82 96 % None (Room air) Lying -- --    06/19/25 0600 -- 80 27 123/63 87 97 % -- -- -- --    06/19/25 0401 98 °F (36.7 °C) 72 16 123/63 87 95 % -- -- -- --    06/19/25 0000 98 °F (36.7 °C) 65 16 130/65 90 95 % -- -- -- No Pain    06/18/25 2200 -- 74 18 123/61 86 97 % -- -- -- --    06/18/25 2000 98.2 °F (36.8 °C) 81 20 127/60 86 97 % None (Room air) Lying 15 No Pain    06/18/25 1958 98.2 °F (36.8 °C) -- -- -- -- -- -- -- -- --    06/18/25 1800 -- 76 20 121/69 91 99 % -- -- -- --    06/18/25 1700 -- 72 20 121/62 85 100 % -- -- -- --    06/18/25 1647 98.4 °F (36.9 °C) 69 20 118/67 -- 100 % -- -- -- --    06/18/25 1615 -- 81 20 118/63 -- 97 % -- -- -- --    06/18/25 1600 -- 76 20 109/63 81 97 % -- -- 15 --    06/18/25 1545 -- 76 20 112/60 -- 97 % -- -- -- --    06/18/25 1500 98.3 °F (36.8 °C) 75 20 101/53 73 98 %  None (Room air) -- -- --    06/18/25 1453 98.4 °F (36.9 °C) 77 18 106/57 -- 98 % None (Room air) -- -- --    06/18/25 1430 97 °F (36.1 °C) 74 18 104/58 -- -- -- -- -- --    06/18/25 1422 97 °F (36.1 °C) 78 18 104/58 -- -- -- -- -- --    06/18/25 1200 -- -- -- -- -- -- -- -- 15 --    06/18/25 1100 98 °F (36.7 °C) -- -- 102/58 84 -- -- Lying -- --    06/18/25 1000 -- 76 20 103/55 74 -- -- -- -- --    06/18/25 0900 -- 75 29 107/56 76 89 % -- -- -- --    06/18/25 0800 -- 78 33 105/59 77 99 % -- -- 15 No Pain    06/18/25 0700 97.5 °F (36.4 °C) 83 20 91/54 70 97 % -- -- -- --    06/18/25 0600 -- 84 20 99/60 72 95 % -- -- -- --    06/18/25 0500 -- 72 17 104/58 78 98 % -- -- -- --    06/18/25 0400 99 °F (37.2 °C) 73 20 113/62 83 99 % None (Room air) -- 15 --    06/18/25 0300 -- 71 16 103/60 77 98 % -- -- -- --    06/18/25 0200 -- 73 15 95/51 68 96 % -- -- -- --    06/18/25 0145 -- 77 17 107/57 74 96 % -- -- -- --    06/18/25 0130 -- 74 17 107/57 76 97 % -- -- -- --    06/18/25 0115 -- 73 17 108/61 79 97 % -- -- -- --    06/18/25 0100 -- 66 18 111/57 78 98 % -- -- -- --    06/18/25 0045 -- 71 21 103/58 75 96 % -- -- -- --    06/18/25 0030 -- 75 17 102/57 74 97 % -- -- -- --    06/18/25 0015 -- 70 22 93/62 73 98 % -- -- -- --    06/18/25 0000 -- 69 19 103/59 78 98 % -- -- 15 --    06/17/25 2345 -- 67 21 100/57 76 97 % -- -- -- --    06/17/25 2330 -- 67 17 111/59 79 98 % -- -- -- --    06/17/25 2315 -- 78 33 127/60 86 97 % -- -- -- --    06/17/25 2300 -- 72 25 118/61 83 98 % -- -- -- --    06/17/25 2245 -- 72 21 107/59 78 96 % -- -- -- --    06/17/25 2230 -- 73 22 108/61 79 96 % -- -- -- --    06/17/25 2215 -- 72 20 108/59 79 97 % -- -- -- --    06/17/25 2200 -- 72 20 109/59 79 98 % -- -- -- --    06/17/25 2145 -- 71 19 112/61 82 99 % -- -- -- --    06/17/25 2130 -- 72 25 119/63 85 98 % -- -- -- --    06/17/25 2115 -- 79 25 101/63 76 95 % -- -- -- --    06/17/25 2100 -- 79 24 102/63 78 96 % -- -- -- --    06/17/25 2045 -- 79  28 121/65 89 96 % -- -- -- --    06/17/25 2030 -- 74 23 122/64 87 96 % -- -- -- --    06/17/25 2015 -- 69 17 119/62 85 97 % -- -- -- --    06/17/25 2000 -- 68 18 123/58 83 96 % -- -- 15 No Pain    06/1945 -- 76 21 106/55 75 97 % -- -- -- --    06/17/25 1930 -- 73 20 112/61 78 97 % -- -- -- --    06/17/25 1915 -- 77 27 130/63 90 98 % -- -- -- --    06/17/25 1900 -- 77 21 109/62 79 98 % -- -- -- --    06/17/25 1659 -- -- -- -- -- -- None (Room air) -- -- --    06/17/25 1600 -- 78 16 115/56 77 98 % -- -- -- --    06/17/25 1500 98.9 °F (37.2 °C) 83 18 107/58 78 98 % -- -- -- --    06/17/25 1400 -- 86 20 91/50 66 97 % -- -- 15 No Pain    06/17/25 1336 99.4 °F (37.4 °C) 89 20 90/54 67 98 % None (Room air) Lying -- No Pain    06/17/25 1315 99.2 °F (37.3 °C) 91 20 99/58 75 96 % None (Room air) Lying -- --    06/17/25 1310 -- 90 -- 95/53 68 96 % -- -- -- --    06/17/25 1305 -- 87 -- 101/57 75 97 % -- -- -- --    06/17/25 1300 -- 89 -- 93/55 70 98 % -- -- -- --    06/17/25 1255 99.2 °F (37.3 °C) 90 20 95/50 66 96 % None (Room air) -- -- --    06/17/25 1252 -- 92 -- 83/51 61 96 % -- -- -- --    06/17/25 1250 -- 91 -- 90/51 66 95 % -- -- -- --    06/17/25 1245 -- 91 -- 87/52 65 96 % -- -- -- --    06/17/25 1240 -- 95 -- 75/50 58 96 % -- -- -- --    06/17/25 1235 -- 92 -- 90/50 65 95 % -- -- -- --    06/17/25 1230 -- 92 -- 86/50 64 96 % -- -- -- --    06/17/25 1226 -- 95 -- 91/51 56 95 % -- -- -- --    06/17/25 1215 99 °F (37.2 °C) 98 18 81/52 62 98 % None (Room air) Lying -- --    06/17/25 1214 -- 98 -- 81/52 62 -- -- -- -- --    06/17/25 1200 -- 101 18 79/47 59 97 % None (Room air) Lying -- --    06/17/25 1145 -- 105 20 81/51 61 97 % -- Lying -- --    06/17/25 1130 -- 110 20 79/41 51 97 % -- Lying -- --    06/17/25 1115 100 °F (37.8 °C) 110 20 69/41 51 97 % -- Lying -- --    06/17/25 1100 -- 116 20 82/43 58 97 % -- Lying 15 --    06/17/25 1054 100.2 °F (37.9 °C) 114 20 92/54 65 98 % None (Room air) Lying -- No Pain               Pertinent Labs/Diagnostic Test Results:   Radiology:  MRI foot/forefoot toest right wo contrast   Final Interpretation by Brandon Mayorga MD (06/18 1043)      Postsurgical changes of partial fourth and fifth ray resections with a soft tissue ulcer overlying the cut surfaces of the fourth and fifth metatarsals and findings concerning for early osteomyelitis in the residual fourth metatarsal. No definite MRI    evidence of osteomyelitis.      The ulcer is also in close approximation to the distal third metatarsal, and early osteomyelitis in that location cannot be excluded.      No evidence of an abscess.      The study was marked in EPIC for immediate notification.      Workstation performed: [id number]         XR chest portable   Final Interpretation by Staci Shah MD (06/18 1042)      No acute cardiopulmonary disease.      Right jugular catheter in mid SVC with no pneumothorax.            Workstation performed: [id number]         XR tibia fibula 2 views RIGHT   ED Interpretation by Binh Heard PA-C (06/17 1154)   Naf.      Final Interpretation by Eleazar Coronado MD (06/17 1514)      Postop changes of the fourth and fifth metatarsals.      No x-ray evidence of osteomyelitis at this time.         Computerized Assisted Algorithm (CAA) may have been used to analyze all applicable images.         Workstation performed: [id number]         XR chest portable   ED Interpretation by Binh Heard PA-C (06/17 1154)   No acute disease.      Final Interpretation by Mazin Yeung MD (06/17 1511)      No acute cardiopulmonary disease.            Workstation performed: [id number]         XR foot 3+ views RIGHT   Final Interpretation by Eleazar Coronado MD (06/17 1514)      Postop changes of the fourth and fifth metatarsals.      No x-ray evidence of osteomyelitis at this time.         Computerized Assisted Algorithm (CAA) may have been used to analyze all applicable images.          Workstation performed: [id number]             Results from last 7 days   Lab Units 06/19/25  0401 06/18/25  1759 06/18/25  0552 06/18/25  0431 06/17/25  1112   WBC Thousand/uL 10.14  --  9.70 10.01 17.93*   HEMOGLOBIN g/dL 7.1* 7.4* 6.3* 6.2* 8.8*   HEMATOCRIT % 22.5* 23.4* 19.8* 19.5* 28.4*   PLATELETS Thousands/uL 232  --  223 231 361   TOTAL NEUT ABS Thousands/µL 8.94*  --   --   --   --    BANDS PCT %  --   --   --   --  23*         Results from last 7 days   Lab Units 06/19/25  0401 06/18/25  2215 06/18/25  1154 06/18/25  0431 06/17/25  2135 06/17/25  1112   SODIUM mmol/L 136 135 135 134* 132* 133*   POTASSIUM mmol/L 3.2* 3.4* 3.3* 3.3* 4.0 4.1   CHLORIDE mmol/L 108 107 105 105 102 99   CO2 mmol/L 20* 21 20* 20* 18* 20*   ANION GAP mmol/L 8 7 10 9 12 14*   BUN mg/dL 25 29* 29* 28* 30* 23   CREATININE mg/dL 1.04 1.17 1.26 1.39* 1.56* 1.49*   EGFR ml/min/1.73sq m 67 58 53 47 41 44   CALCIUM mg/dL 8.0* 8.1* 8.3* 8.4 7.8* 9.0   CALCIUM, IONIZED mmol/L  --  1.14  --  1.12 1.04*  --    MAGNESIUM mg/dL 2.4 2.5  --  2.6 1.8* 1.4*   PHOSPHORUS mg/dL 2.1* 2.1*  --  3.6 4.4* 3.1     Results from last 7 days   Lab Units 06/18/25  0431 06/17/25  1112   AST U/L 16 14   ALT U/L 10 12   ALK PHOS U/L 43 73   TOTAL PROTEIN g/dL 5.6* 6.6   ALBUMIN g/dL 2.9* 3.0*   TOTAL BILIRUBIN mg/dL 0.27 0.64     Results from last 7 days   Lab Units 06/19/25  1355 06/19/25  1232 06/19/25  1057 06/19/25  0559 06/19/25  0356 06/19/25  0135 06/18/25  2342 06/18/25  2154 06/18/25  2006 06/18/25  1809 06/18/25  1600 06/18/25  1411   POC GLUCOSE mg/dl 198* 234* 246* 122 115 144* 151* 167* 199* 175* 174* 168*     Results from last 7 days   Lab Units 06/19/25  0401 06/18/25  2215 06/18/25  1154 06/18/25  0431 06/17/25  2135 06/17/25  1112   GLUCOSE RANDOM mg/dL 131 156* 189* 143* 348* 237*     Results from last 7 days   Lab Units 06/17/25  1259   PH ERIN  7.376   PCO2 ERIN mm Hg 29.1*   PO2 ERIN mm Hg 50.7*   HCO3 ERIN mmol/L 16.7*   BASE EXC ERIN  mmol/L -7.6   O2 CONTENT ERIN ml/dL 10.1   O2 HGB, VENOUS % 81.4*     Results from last 7 days   Lab Units 06/19/25  1357 06/19/25  0838 06/19/25  0100 06/18/25  1154 06/18/25  0431 06/17/25  2135 06/17/25  1523 06/17/25  1112   PROTIME seconds  --   --   --   --  28.7*  --  26.8* 24.5*   INR   --   --   --   --  2.68*  --  2.44* 2.16*   PTT seconds >210* 57* 56*   < > 46*   < > 32 29    < > = values in this interval not displayed.     Results from last 7 days   Lab Units 06/19/25  0351 06/18/25  0431 06/17/25  1112   PROCALCITONIN ng/ml 18.42* 27.46* 14.91*     Results from last 7 days   Lab Units 06/18/25  0008 06/17/25  2135 06/17/25  1347 06/17/25  1112   LACTIC ACID mmol/L 1.6 2.1* 3.7* 3.7*     Results from last 7 days   Lab Units 06/17/25  1112   BNP pg/mL 441*     Results from last 7 days   Lab Units 06/17/25  2117   CLARITY UA  Clear   COLOR UA  Yellow   SPEC GRAV UA  1.025   PH UA  6.0   GLUCOSE UA mg/dl 2+*   KETONES UA mg/dl 15 (1+)*   BLOOD UA  Negative   PROTEIN UA mg/dl 1+*   NITRITE UA  Negative   BILIRUBIN UA  Negative   UROBILINOGEN UA E.U./dl 0.2   LEUKOCYTES UA  Negative   WBC UA /hpf 4-10*   RBC UA /hpf 0-1   BACTERIA UA /hpf None Seen   EPITHELIAL CELLS WET PREP /hpf Occasional     Results from last 7 days   Lab Units 06/17/25  1112   BLOOD CULTURE  Staphylococcus aureus*  No Growth at 24 hrs.   GRAM STAIN RESULT  Gram positive cocci in clusters*       Past Medical History[1]  Present on Admission:   Type 2 diabetes mellitus with complication, without long-term current use of insulin (HCC)   Mixed hyperlipidemia   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HCC)   Gastroesophageal reflux disease without esophagitis   Moderate protein-calorie malnutrition (HCC)   Septic shock (HCC)   PAF (paroxysmal atrial fibrillation) (HCC)   Severe peripheral arterial disease (HCC)   Pressure injury of skin of sacral region   Metabolic acidosis   Acute renal failure superimposed on stage 2 chronic kidney disease  (HCC)   Essential  hypertension   CVA (cerebrovascular accident) (HCC)   Anemia   Ambulatory dysfunction   Pruritic condition   Hyponatremia      Admitting Diagnosis: Hyponatremia [E87.1]  Metabolic acidosis [E87.20]  Anemia [D64.9]  GERDA (acute kidney injury) (HCC) [N17.9]  Visit for wound check [Z51.89]  Septic shock (HCC) [A41.9, R65.21]  Diabetic ulcer of right midfoot (HCC) [E11.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 male    Network Utilization Review Department  ATTENTION: Please call with any questions or concerns to [phone] and carefully listen to the prompts so that you are directed to the right person. All voicemails are confidential.   For Discharge needs, contact Care Management DC Support Team at [phone] opt. [phone]  Send all requests for admission clinical reviews, approved or denied determinations and any other requests to dedicated fax number below belonging to the campus where the patient is receiving treatment. List of dedicated fax numbers for the Facilities:  FACILITY NAME UR FAX NUMBER   ADMISSION DENIALS (Administrative/Medical Necessity) [phone]   DISCHARGE SUPPORT TEAM (NETWORK) [phone]   PARENT CHILD HEALTH (Maternity/NICU/Pediatrics) [phone]   [facility] [phone]   [facility] [phone]   [facility] [phone]   [facility] [phone]   [facility] [phone]   [facility] [phone]   [facility] [phone]   [facility] [phone]   [facility] [phone]   [facility] [phone]   [facility] [phone]   [facility] [phone]              [1]   Past Medical History:  Diagnosis Date    Asthma     COVID-19     CVA (cerebral vascular  accident) (HCC)     Diabetes mellitus (HCC)     Environmental allergies     Hyperlipidemia     Hypertension     Macular degeneration 07/13/2020    right eye    Orthostatic hypotension     Osteopenia     Pneumonia     Pneumothorax     Sinusitis

## 2025-06-18 NOTE — ASSESSMENT & PLAN NOTE
Moderate per PFTs from 2024  Continue home Breztri inhaler per hospital formulary and PRN Albuterol nebs   Continue home Singulair  Aggressive pulmonary hygiene  Goal SpO2 = > 88%

## 2025-06-18 NOTE — ASSESSMENT & PLAN NOTE
Present on arrival to ED AEB fever, tachycardia, leukocytosis w/bandemia, hypotension requiring vasopressor therapy  Unclear etiology, though suspect wound infection of R foot vs sacral ulcers  Has chronic osteomyelitis of R foot, is s/p R partial 5th ray resx on 05/16 and r partial 4th ray amp w/debridement on 05/23, and [facility]  CXR w/out evidence of pneumonia and R foot XRs w/out evidence of osteomyelitis  Blood cultures pending  UA w/out evidence of infection  MRSA swab pending  R foot wound culture from 05/16 w/Serratia, Enterococcus faecalis, Pseudomonas  LA initially 3.7 - has since cleared  Procalcitonin 14.91 > 27.46  Is s/p aggressive volume resuscitation w/crystalloid and colloid to include 30 cc/kg per sepsis protocol - would use caution w/additional volume given HF hx  Previously requiring Levophed gtt to maintain MAP > 65 - now off  Continue SDS and Florinef for suspected adrenal insufficiency   Continue IV Zosyn/Vancomycin D#2 - de-escalate as able  Podiatry following  R foot MRI ordered to evaluate further   Plan for possible surgical intervention on Friday  Trend fever and WBC curve  [facility] ED [facility] on [facility]17 given concern for R foot infection   Please see plan as noted above

## 2025-06-18 NOTE — ASSESSMENT & PLAN NOTE
Lab Results   Component Value Date    HGBA1C 9.3 (H) 04/09/2025       Recent Labs     06/17/25  1836 06/17/25  2357 06/18/25  0155 06/18/25  0350   POCGLU 338* 325* 232* 168*       Blood Sugar Average: Last 72 hrs:  (P) 265.75    Poorly controlled diabetes at baseline  Holding home Glipizide, metformin, and Tradjenta while here  Continue insulin gtt given hyperglycemia  Goal  - 180

## 2025-06-18 NOTE — ASSESSMENT & PLAN NOTE
Iron counts low and sat  Hold on Iron given septic state  1 UPRBC 6/18  Transfuse hgb <7  No s/s acute  bleeding --dilutional with volume resuscitation  6/19: Hemoglobin at 7.1  Monitor CBC

## 2025-06-18 NOTE — OCCUPATIONAL THERAPY NOTE
Occupational Therapy Cancellation Note     06/18/25 0745   OT Last Visit   OT Visit Date 06/18/25   Note Type   Note type Cancelled Session   Cancel Reasons Medical status   Additional Comments low hemoglobin 6.3     OT orders received. Chart reviewed. Will follow-up as able and appropriate    Marcella Redd OTR/L

## 2025-06-18 NOTE — ASSESSMENT & PLAN NOTE
Wt Readings from Last 3 Encounters:   06/17/25 64.4 kg (141 lb 15.6 oz)   06/10/25 67.1 kg (148 lb)   05/28/25 83.6 kg (184 lb 4.9 oz)     TTE from 05/2025 revealed EF 50% w/G1DD and hypokinetic mid anteroseptal/apical septal segments; RA w/? prominent eustachian valve or partial cor triatriatum sehryl; mild AR/MR, trace TR w/RSVP 38  Does not take diuretics as OP  Holding home ACE-I and BB 2/2 shock state  Strict I/O  Daily weights

## 2025-06-18 NOTE — PROGRESS NOTES
Progress Note - Critical Care/ICU   Name: Gold Van 79 y.o. male I MRN: [id number]  Unit/Bed#: ICU 02-01 I Date of Admission: 6/17/2025   Date of Service: 6/18/2025 I Hospital Day: 1      Assessment & Plan  Septic shock (HCC)  Present on arrival to ED AEB fever, tachycardia, leukocytosis w/bandemia, hypotension requiring vasopressor therapy  Unclear etiology, though suspect wound infection of R foot vs sacral ulcers  Has chronic osteomyelitis of R foot, is s/p R partial 5th ray resx on 05/16 and r partial 4th ray amp w/debridement on 05/23, and follows w/[facility]  CXR w/out evidence of pneumonia and R foot XRs w/out evidence of osteomyelitis  Blood cultures pending  UA w/out evidence of infection  MRSA swab pending  R foot wound culture from 05/16 w/Serratia, Enterococcus faecalis, Pseudomonas  LA initially 3.7 - has since cleared  Procalcitonin 14.91 > 27.46  Is s/p aggressive volume resuscitation w/crystalloid and colloid to include 30 cc/kg per sepsis protocol - would use caution w/additional volume given HF hx  Previously requiring Levophed gtt to maintain MAP > 65 - now off  Continue SDS and Florinef for suspected adrenal insufficiency   Continue IV Zosyn/Vancomycin D#2 - de-escalate as able  Podiatry following  R foot MRI ordered to evaluate further   Plan for possible surgical intervention on Friday  Trend fever and WBC curve  [facility] on [facility]/17 given concern for R foot infection   Please see plan as noted above  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)  Sent to ED from [facility] on 06/17 given concern for R foot infection   Please see plan as noted above  Acute renal failure superimposed on stage 2 chronic kidney disease  (HCC)  Creatinine 1.49 on admission - peaked at 1.56  Baseline creatinine appears to be around 0.7-0.9  Suspect this is 2/2 shock state, hypoperfusion, hypovolemia, ACE-I use  Is s/p aggressive volume resuscitation as noted  above  Avoid nephrotoxic agents and further hypotension  Trend renal indices  Strict I/O - urinary retention protocol  Daily weights  Metabolic acidosis  Suspect this is 2/2 GERDA, lactic acidosis, dehydration  Is s/p volume resuscitation as noted above  Continue to trend for worsening acidosis  Moderate protein-calorie malnutrition (HCC)  Malnutrition Findings:                                 BMI Findings:           Body mass index is 19.26 kg/m².     Nutrition consult pending  Pressure injury of skin of sacral region  Stage II sacral PI POA  Has been WCB 2/2 NWB to RLE post surgery  Wound care following  Offload pressure as able w/frequent repositioning  Type 2 diabetes mellitus with complication, without long-term current use of insulin ([facility])  Lab Results   Component Value Date    HGBA1C 9.3 (H) 04/09/2025       Recent Labs     06/17/25  1836 06/17/25  2357 06/18/25  0155 06/18/25  0350   POCGLU 338* 325* 232* 168*       Blood Sugar Average: Last 72 hrs:  (P) 265.75    Poorly controlled diabetes at baseline  Holding home Glipizide, metformin, and Tradjenta while here  Continue insulin gtt given hyperglycemia  Goal  - 180  Mixed hyperlipidemia  Continue home statin  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HCC)  Moderate per PFTs from 2024  Continue home Breztri inhaler per hospital formulary and PRN Albuterol nebs   Continue home Singulair  Aggressive pulmonary hygiene  Goal SpO2 = > 88%  Gastroesophageal reflux disease without esophagitis  Continue home Pepcid at renally-adjusted dose  Anemia  Baseline Hgb appears to fluctuate, but recently appears to be around 7-9  Would benefit from iron supplementation as iron sat low at 11 on recent labs  Trend Hgb and transfuse for Hgb < 7  Severe peripheral arterial disease (HCC)  Is s/p R CFA/SFA endarterectomy/stenting on 05/13   Changed from ASA to Plavix as OP post intervention for stent patency - holding home Plavix for now given need for surgical intervention  Continues on systemic AC  w/Heparin gtt as noted above  Frequent neurovascular checks  PAF (paroxysmal atrial fibrillation) ([facility])  Currently in SR  Holding home Atenolol 2/2 hypotension  Holding home Eliquis 2/2 need for surgical intervention - continue Heparin gtt for now  Continue cardiac monitoring  Chronic heart failure with preserved ejection fraction (HFpEF) ([facility])  Wt Readings from Last 3 Encounters:   06/17/25 64.4 kg (141 lb 15.6 oz)   06/10/25 67.1 kg (148 lb)   05/28/25 83.6 kg (184 lb 4.9 oz)     TTE from 05/2025 revealed EF 50% w/G1DD and hypokinetic mid anteroseptal/apical septal segments; RA w/? prominent eustachian valve or partial cor triatriatum sheryl; mild AR/MR, trace TR w/RSVP 38  Does not take diuretics as OP  Holding home ACE-I and BB 2/2 shock state  Strict I/O  Daily weights  Essential hypertension  Holding home Lisinopril and Atenolol given shock state  Pruritic condition  Holding home Prednisone for now - currently receiving SDS  CVA (cerebrovascular accident) ([facility])  Holding home Plavix and Eliquis given need for surgical intervention - continue systemic AC w/Heparin gtt for now  Continue home statin  Frequent neuro checks  Ambulatory dysfunction  WCB post surgery as noted above  PT/OT    Disposition: Critical care    ICU Core Measures     A: Assess, Prevent, and Manage Pain Has pain been assessed? Yes  Need for changes to pain regimen? No   B: Both SAT/SAT  N/A   C: Choice of Sedation RASS Goal: 0 Alert and Calm  Need for changes to sedation or analgesia regimen? No   D: Delirium CAM-ICU: Negative   E: Early Mobility  Plan for early mobility? Yes   F: Family Engagement Plan for family engagement today? Yes       Antibiotic Review: Awaiting culture results.  and Continue broad spectrum secondary to severity of illness.     Review of Invasive Devices:      Central access plan: Medications requiring central line Hemodynamic monitoring      Prophylaxis:  VTE VTE covered by:  heparin (porcine), Intravenous, 16  Units/kg/hr at 06/17/25 2221       Stress Ulcer  covered byfamotidine (PEPCID) 20 mg tablet [[id number]] (Long-Term Med), famotidine (PEPCID) tablet 20 mg [[id number]]         24 Hour Events :     Received additional 1L Isolyte as well as 5% Albumin 25 g x2  Levophed gtt able to be weaned off overnight  Insulin gtt started for hyperglycemia      Subjective   Review of Systems: Review of Systems   Constitutional:  Negative for chills and fever.   HENT:  Negative for congestion.    Eyes:  Negative for visual disturbance.   Respiratory:  Negative for cough and shortness of breath.    Cardiovascular:  Negative for chest pain.   Gastrointestinal:  Negative for abdominal pain, nausea and vomiting.   Genitourinary:  Negative for difficulty urinating.   Musculoskeletal:  Negative for back pain.   Neurological:  Negative for dizziness, light-headedness and headaches.   Psychiatric/Behavioral:  Negative for confusion.        Objective :                   Vitals I/O      Most Recent Min/Max in 24hrs   Temp 98.9 °F (37.2 °C) Temp  Min: 98.9 °F (37.2 °C)  Max: 100.2 °F (37.9 °C)   Pulse 73 Pulse  Min: 66  Max: 144   Resp 21 Resp  Min: 15  Max: 33   /62 BP  Min: 69/41  Max: 130/63   O2 Sat 99 % SpO2  Min: 95 %  Max: 99 %      Intake/Output Summary (Last 24 hours) at 6/18/2025 0428  Last data filed at 6/18/2025 0400  Gross per 24 hour   Intake 6493.67 ml   Output 850 ml   Net 5643.67 ml       Diet Cardiovascular; Cardiac; Consistent Carbohydrate Diet Level 2 (5 carb servings/75 grams CHO/meal)    Invasive Monitoring           Physical Exam   Physical Exam  Vitals and nursing note reviewed.   Eyes:      Pupils: Pupils are equal, round, and reactive to light.   Skin:     General: Skin is warm and dry.      Capillary Refill: Capillary refill takes 2 to 3 seconds.      Coloration: Skin is pale.      Comments: R foot wound w/dressing CDI   Neck:      Vascular: Central line present.   Cardiovascular:      Rate and Rhythm: Normal  rate and regular rhythm.      Pulses:           Radial pulses are 2+ on the right side and 2+ on the left side.        Dorsalis pedis pulses are 1+ on the right side and 1+ on the left side.      Heart sounds: Normal heart sounds.   Musculoskeletal:      Right lower leg: No edema.      Left lower leg: No edema.   Abdominal: General: Bowel sounds are normal. There is no distension.      Palpations: Abdomen is soft.      Tenderness: There is no abdominal tenderness.   Constitutional:       General: He is awake. He is not in acute distress.  Pulmonary:      Effort: Pulmonary effort is normal.      Breath sounds: Decreased breath sounds present.   Psychiatric:         Behavior: Behavior is cooperative.   Neurological:      General: No focal deficit present.      Mental Status: He is alert and oriented to person, place and time.          Diagnostic Studies        Lab Results: I have reviewed the following results:     Medications:  Scheduled PRN   atorvastatin, 40 mg, QPM  budesonide-formoterol, 2 puff, BID  chlorhexidine, 15 mL, Q12H SANDRA  famotidine, 20 mg, Daily  fludrocortisone, 0.05 mg, Daily  hydrocortisone sodium succinate, 50 mg, Q6H SANDRA  montelukast, 10 mg, HS  multivitamin-minerals, 1 tablet, Daily  piperacillin-tazobactam, 4.5 g, Q8H  senna-docusate sodium, 2 tablet, HS  vancomycin, 15 mg/kg, Q24H      acetaminophen, 650 mg, Q6H PRN  albuterol, 2 puff, Q4H PRN  albuterol, 2.5 mg, Q6H PRN  ondansetron, 4 mg, Q6H PRN  polyethylene glycol, 17 g, Daily PRN       Continuous    heparin (porcine), 3-20 Units/kg/hr (Order-Specific), Last Rate: 16 Units/kg/hr (06/17/25 2221)  insulin regular (HumuLIN R,NovoLIN R) 1 Units/mL in sodium chloride 0.9 % 100 mL infusion, 0.3-21 Units/hr, Last Rate: 3 Units/hr (06/18/25 0350)  multi-electrolyte, 75 mL/hr, Last Rate: 75 mL/hr (06/17/25 1918)  norepinephrine, 1-30 mcg/min, Last Rate: Stopped (06/17/25 2330)         Labs:   CBC    Recent Labs     06/17/25  1112   WBC 17.93*   HGB  8.8*   HCT 28.4*      BANDSPCT 23*     BMP    Recent Labs     06/17/25  1112 06/17/25  2135   SODIUM 133* 132*   K 4.1 4.0   CL 99 102   CO2 20* 18*   AGAP 14* 12   BUN 23 30*   CREATININE 1.49* 1.56*   CALCIUM 9.0 7.8*       Coags    Recent Labs     06/17/25  1112 06/17/25  1523 06/17/25  2135   INR 2.16* 2.44*  --    PTT 29 32 41*        Additional Electrolytes  Recent Labs     06/17/25  1112 06/17/25  2135   MG 1.4* 1.8*   PHOS 3.1 4.4*   CAIONIZED  --  1.04*          Blood Gas    No recent results  Recent Labs     06/17/25  1259   PHVEN 7.376   YVM6JAV 29.1*   PO2VEN 50.7*   PXE1ZHQ 16.7*   BEVEN -7.6   [id number] 81.4*    LFTs  Recent Labs     06/17/25  1112   ALT 12   AST 14   ALKPHOS 73   ALB 3.0*   TBILI 0.64       Infectious  Recent Labs     06/17/25  1112   PROCALCITONI 14.91*     Glucose  Recent Labs     06/17/25  1112 06/17/25  2135   GLUC 237* 348*

## 2025-06-18 NOTE — ASSESSMENT & PLAN NOTE
Is s/p R CFA/SFA endarterectomy/stenting on 05/13   Changed from ASA to Plavix as OP post intervention for stent patency - holding home Plavix for now given need for surgical intervention  Continues on systemic AC w/Heparin gtt as noted above  Frequent neurovascular checks

## 2025-06-18 NOTE — DISCHARGE INSTR - OTHER ORDERS
Skin and Wound Care Plan:   1- Bilateral lower extremity wounds: cleanse with VASHE, apply non-adherent oil emulsion dressings on the open wound beds then top with Melgisorb, cover with silicone foam dressing or patient's preferred band aids from home. Ramone with T for treatment, date. Change every other day and as needed if soiled/displaced.   2- Apply 3M no sting barrier to macerated tissue, Triad paste to wounds to the right and left upper buttocks/sacral region, cover with Mepilex bordered foam dressing, ramone with T, date, change daily and PRN  3- Offloading green wedges  4- MASD/ITD groin, scrotum, upper thighs and perianal skin: Cleanse with soap and water, pat dry. Apply Musa cream to  2x/day and as needed if soiled.  5- Elevate heels with pillows to offload pressure  6- Ehob offloading cushion when OOB to the chair  7- Place patient on ATR turning and repositioning system.  8- Moisturize skin daily with skin nourishing cream  9- Apply silicone bordered foam dressings (Mepilex) to left Heel. Ramone with P for Prevention and change every 3 days or PRN. Peel back and inspect Q-shift.  10- Right foot managed by podiatry  11- Recommend patient follow up with out patient wound care referral for bilateral buttocks wounds. Referral already in place.   12: Right heel: Apply Mepilex (silicone foam dressing) to heel, ramone T for treatment, date. Change every other day and as needed if soiled/displaced. Peel back dressing every shift to inspect skin integrity.   13: Specialty bed ordered      Schedule Follow-up Outpatient Wound Appointment.  Call Outpatient Wound and Ostomy Care Team @ [phone]   Option 1: [place], [place], [place], [place]  Option 2: [place], [place], [place]      Discharge Instructions - Podiatry    Weight Bearing Status: Strict nonweightbearing to surgical foot          Pain: Continue analgesics as directed    Follow-up appointment instructions: Please make an appointment within one week of  discharge with Dr. Galeas. Contact sooner if any increase in pain, or signs of infection occur    Wound Care: Leave dressings clean, dry, and intact between professional dressing changes    Nursing Instructions: Please apply Adaptic. Then cover with Gauze and secure with Kerlix and tape. Please change dressing every Monday, Wednesday, and Friday .

## 2025-06-18 NOTE — CONSULTS
[facility] Podiatry - Consultation    Patient Information:   Gold Van 79 y.o. male MRN: [id number]  Unit/Bed#: ICU 02-01 Encounter: [id number]  PCP: Shayne Diaz MD  Date of Admission:  6/17/2025  Date of Consultation: 06/18/25  Requesting Physician: Maged Garza MD *      ASSESSMENT:    Gold Van is a 79 y.o. male with:    OM of the right foot  Sepsis with shock on admission  Altered mental status  Uncontrolled DM  Severe PAD    PLAN:    S/p revasc, source is highly likely the right foot. The overall clinical appearance of the wound has deteriorated and has exposed bone.   MRI shows changes conserning for OM on the 4th met, these findings are rather slight considering the severity of his systemic clinical appearance  Will plan for TMA with rotational flap on Friday, I anciptate likely a need for long term abx as we may not obtain a clean margin here  Will be strict NWB to the right foor between 2 and 4 wks until sutures are removed.   Rest of care per primary team.        SUBJECTIVE    History of Present Illness:    Gold Van is a 79 y.o. male with past medical history of sepsis who presents with new onset infection in the right foot and is substantially medically improved since yesterday. .     Review of Systems:    Constitutional: Negative.    HENT: Negative.    Eyes: Negative.    Respiratory: Negative.    Cardiovascular: Negative.    Gastrointestinal: Negative.    Musculoskeletal: neg   Skin:as above   Neurological: numbness   Psych: Negative.     Past Medical and Surgical History:     Past Medical History[1]    Past Surgical History[2]    Meds/Allergies:      Medications Prior to Admission:     albuterol (ACCUNEB) 1.25 MG/3ML nebulizer solution    albuterol (PROVENTIL HFA,VENTOLIN HFA) 90 mcg/act inhaler    apixaban (ELIQUIS) 5 mg    atenolol (TENORMIN) 25 mg tablet    atorvastatin (LIPITOR) 40 mg tablet    Budeson-Glycopyrrol-Formoterol (Breztri Aerosphere) 160-9-4.8 MCG/ACT AERO     clopidogrel (PLAVIX) 75 mg tablet    docusate sodium (COLACE) 100 mg capsule    famotidine (PEPCID) 20 mg tablet    glipiZIDE (GLUCOTROL XL) 10 mg 24 hr tablet    lisinopril (ZESTRIL) 5 mg tablet    metFORMIN (GLUCOPHAGE-XR) 500 mg 24 hr tablet    montelukast (SINGULAIR) 10 mg tablet    Multiple Vitamins-Minerals (Multivitamin Adults) TABS    OneTouch Ultra test strip    oxyCODONE (ROXICODONE) 10 MG TABS    polyethylene glycol (MIRALAX) 17 g packet    predniSONE 5 mg tablet    Allergies[3]    Social History:     Marital Status: /Civil Union    Substance Use History:   Social History     Substance and Sexual Activity   Alcohol Use Never     Tobacco Use History[4]  Social History     Substance and Sexual Activity   Drug Use Never       Family History:    Family History[5]      OBJECTIVE:    Vitals:   Blood Pressure: 102/58 (06/18/25 1100)  Pulse: 76 (06/18/25 1000)  Temperature: 98 °F (36.7 °C) (06/18/25 1100)  Temp Source: Temporal (06/18/25 1100)  Respirations: 20 (06/18/25 1000)  Height: 6' (182.9 cm) (06/17/25 1336)  Weight - Scale: 67.3 kg (148 lb 5.9 oz) (06/18/25 0600)  SpO2: (!) 89 % (06/18/25 0900)    Physical Exam:     General Appearance: Alert, cooperative, no distress.  HEENT: Head normocephalic, atraumatic, without obvious abnormality.    Lungs: Non-labored breathing. No respiratory distress.  Abdomen: Without distension.  Psychiatric: AAOx3  Lower Extremity:  Vascular:   Exposed one on the right foot 4,5 met remnant. There is new onset  erythema and increased drainage with purulence and increased fibrotic tissue and some malodor within the wound. Wound size is improving.   Additional Data:     Lab Results: I have personally reviewed pertinent labs including:    Results from last 7 days   Lab Units 06/18/25  0552 06/18/25  0431 06/17/25  1112   WBC Thousand/uL 9.70   < > 17.93*   HEMOGLOBIN g/dL 6.3*   < > 8.8*   HEMATOCRIT % 19.8*   < > 28.4*   PLATELETS Thousands/uL 223   < > 361   LYMPHO PCT %   "--   --  0*   MONO PCT %  --   --  1*   EOS PCT %  --   --  0    < > = values in this interval not displayed.     Results from last 7 days   Lab Units 06/18/25  1154 06/18/25  0431   POTASSIUM mmol/L 3.3* 3.3*   CHLORIDE mmol/L 105 105   CO2 mmol/L 20* 20*   BUN mg/dL 29* 28*   CREATININE mg/dL 1.26 1.39*   CALCIUM mg/dL 8.3* 8.4   ALK PHOS U/L  --  43   ALT U/L  --  10   AST U/L  --  16     Results from last 7 days   Lab Units 06/18/25  0431   INR  2.68*       Cultures: I have personally reviewed pertinent cultures including:    Results from last 7 days   Lab Units 06/17/25  1112   BLOOD CULTURE  Received in Microbiology Lab. Culture in Progress.  Received in Microbiology Lab. Culture in Progress.           Imaging: I have personally reviewed pertinent films in PACS.  EKG, Pathology, and Other Studies: I have personally reviewed pertinent reports.        ** Please Note: Portions of the record may have been created with voice recognition software. Occasional wrong word or \"sound a like\" substitutions may have occurred due to the inherent limitations of voice recognition software. Read the chart carefully and recognize, using context, where substitutions have occurred. **         [1]   Past Medical History:  Diagnosis Date    Asthma     COVID-19     CVA (cerebral vascular accident) (HCC)     Diabetes mellitus (HCC)     Environmental allergies     Hyperlipidemia     Hypertension     Macular degeneration 07/13/2020    right eye    Orthostatic hypotension     Osteopenia     Pneumonia     Pneumothorax     Sinusitis    [2]   Past Surgical History:  Procedure Laterality Date    CARPAL TUNNEL RELEASE Left 08/2024    CATARACT EXTRACTION Left 07/2024    COLONOSCOPY      KNEE CARTILAGE SURGERY Right 1964    AL AMPUTATION METATARSAL W/TOE SINGLE Right 05/16/2025    Procedure: RAY RESECTION FOOT - R partial 5th ray resection;  Surgeon: Reinaldo Brewer DPM;  Location: [facility];  Service: Podiatry    AL AMPUTATION METATARSAL W/TOE " SINGLE Right 05/23/2025    Procedure: Right partial 4th ray amputation, wound debridement;  Surgeon: Brandon Phillips DPM;  Location: [facility];  Service: Podiatry    UT TEAEC W/WO PATCH GRAFT COMMON FEMORAL Right 05/13/2025    Procedure: Right common femoral endarterectomy with bovine pericardial patch Right lower extremity angiogram Third order cannulation of the right anterior tibial artery Balloon angioplasty of the right anterior tibial with a 3x220 [place] balloon DCB angioplasty of the SFA with a 6x150 [place] balloon Stenting of the right SFA with two 6x150 [place] stents, 6x5cm Viabahn, and a 7x5cm viabahn Post-di    VASECTOMY      WISDOM TOOTH EXTRACTION      x4   [3]   Allergies  Allergen Reactions    Ciprofloxacin Shortness Of Breath    Sulfamethoxazole Shortness Of Breath    Trimethoprim Shortness Of Breath    Dexamethasone Other (See Comments)    Triamcinolone Other (See Comments)    Bactrim [Sulfamethoxazole-Trimethoprim] Fever     Fever of 107   [4]   Social History  Tobacco Use   Smoking Status Former   Smokeless Tobacco Never   Tobacco Comments    quit about 25 years ago   [5]   Family History  Problem Relation Name Age of Onset    Asthma Mother      Emphysema Mother      Cancer Father      Asthma Brother      Cancer Brother

## 2025-06-18 NOTE — ASSESSMENT & PLAN NOTE
Malnutrition Findings:                                 BMI Findings:           Body mass index is 19.26 kg/m².     Nutrition consult pending

## 2025-06-18 NOTE — ASSESSMENT & PLAN NOTE
Malnutrition Findings:        Nutrition following                       BMI Findings:           Body mass index is 19.85 kg/m².

## 2025-06-19 LAB
ABO GROUP BLD BPU: NORMAL
ANION GAP SERPL CALCULATED.3IONS-SCNC: 8 MMOL/L (ref 4–13)
APTT PPP: 56 SECONDS (ref 23–34)
APTT PPP: 56 SECONDS (ref 23–34)
APTT PPP: 57 SECONDS (ref 23–34)
APTT PPP: >210 SECONDS (ref 23–34)
BASOPHILS # BLD AUTO: 0 THOUSANDS/ÂΜL (ref 0–0.1)
BASOPHILS NFR BLD AUTO: 0 % (ref 0–1)
BPU ID: NORMAL
BUN SERPL-MCNC: 25 MG/DL (ref 5–25)
CALCIUM SERPL-MCNC: 8 MG/DL (ref 8.4–10.2)
CHLORIDE SERPL-SCNC: 108 MMOL/L (ref 96–108)
CO2 SERPL-SCNC: 20 MMOL/L (ref 21–32)
CREAT SERPL-MCNC: 1.04 MG/DL (ref 0.6–1.3)
CROSSMATCH: NORMAL
EOSINOPHIL # BLD AUTO: 0.03 THOUSAND/ÂΜL (ref 0–0.61)
EOSINOPHIL NFR BLD AUTO: 0 % (ref 0–6)
ERYTHROCYTE [DISTWIDTH] IN BLOOD BY AUTOMATED COUNT: 14.9 % (ref 11.6–15.1)
GFR SERPL CREATININE-BSD FRML MDRD: 67 ML/MIN/1.73SQ M
GLUCOSE SERPL-MCNC: 115 MG/DL (ref 65–140)
GLUCOSE SERPL-MCNC: 122 MG/DL (ref 65–140)
GLUCOSE SERPL-MCNC: 127 MG/DL (ref 65–140)
GLUCOSE SERPL-MCNC: 131 MG/DL (ref 65–140)
GLUCOSE SERPL-MCNC: 137 MG/DL (ref 65–140)
GLUCOSE SERPL-MCNC: 144 MG/DL (ref 65–140)
GLUCOSE SERPL-MCNC: 192 MG/DL (ref 65–140)
GLUCOSE SERPL-MCNC: 193 MG/DL (ref 65–140)
GLUCOSE SERPL-MCNC: 198 MG/DL (ref 65–140)
GLUCOSE SERPL-MCNC: 234 MG/DL (ref 65–140)
GLUCOSE SERPL-MCNC: 246 MG/DL (ref 65–140)
HCT VFR BLD AUTO: 22.5 % (ref 36.5–49.3)
HGB BLD-MCNC: 7.1 G/DL (ref 12–17)
IMM GRANULOCYTES # BLD AUTO: 0.05 THOUSAND/UL (ref 0–0.2)
IMM GRANULOCYTES NFR BLD AUTO: 1 % (ref 0–2)
LYMPHOCYTES # BLD AUTO: 0.67 THOUSANDS/ÂΜL (ref 0.6–4.47)
LYMPHOCYTES NFR BLD AUTO: 7 % (ref 14–44)
MAGNESIUM SERPL-MCNC: 2.4 MG/DL (ref 1.9–2.7)
MCH RBC QN AUTO: 28.1 PG (ref 26.8–34.3)
MCHC RBC AUTO-ENTMCNC: 31.6 G/DL (ref 31.4–37.4)
MCV RBC AUTO: 89 FL (ref 82–98)
MONOCYTES # BLD AUTO: 0.45 THOUSAND/ÂΜL (ref 0.17–1.22)
MONOCYTES NFR BLD AUTO: 4 % (ref 4–12)
MRSA NOSE QL CULT: ABNORMAL
MRSA NOSE QL CULT: ABNORMAL
NEUTROPHILS # BLD AUTO: 8.94 THOUSANDS/ÂΜL (ref 1.85–7.62)
NEUTS SEG NFR BLD AUTO: 88 % (ref 43–75)
NRBC BLD AUTO-RTO: 0 /100 WBCS
PHOSPHATE SERPL-MCNC: 2.1 MG/DL (ref 2.3–4.1)
PLATELET # BLD AUTO: 232 THOUSANDS/UL (ref 149–390)
PMV BLD AUTO: 9.4 FL (ref 8.9–12.7)
POTASSIUM SERPL-SCNC: 3.2 MMOL/L (ref 3.5–5.3)
PROCALCITONIN SERPL-MCNC: 18.42 NG/ML
RBC # BLD AUTO: 2.53 MILLION/UL (ref 3.88–5.62)
SODIUM SERPL-SCNC: 136 MMOL/L (ref 135–147)
UNIT DISPENSE STATUS: NORMAL
UNIT PRODUCT CODE: NORMAL
UNIT PRODUCT VOLUME: 300 ML
UNIT RH: NORMAL
WBC # BLD AUTO: 10.14 THOUSAND/UL (ref 4.31–10.16)

## 2025-06-19 PROCEDURE — 82948 REAGENT STRIP/BLOOD GLUCOSE: CPT

## 2025-06-19 PROCEDURE — 94760 N-INVAS EAR/PLS OXIMETRY 1: CPT

## 2025-06-19 PROCEDURE — 99232 SBSQ HOSP IP/OBS MODERATE 35: CPT

## 2025-06-19 PROCEDURE — 84100 ASSAY OF PHOSPHORUS: CPT | Performed by: NURSE PRACTITIONER

## 2025-06-19 PROCEDURE — 84145 PROCALCITONIN (PCT): CPT

## 2025-06-19 PROCEDURE — 94664 DEMO&/EVAL PT USE INHALER: CPT

## 2025-06-19 PROCEDURE — 85730 THROMBOPLASTIN TIME PARTIAL: CPT | Performed by: INTERNAL MEDICINE

## 2025-06-19 PROCEDURE — 85730 THROMBOPLASTIN TIME PARTIAL: CPT | Performed by: STUDENT IN AN ORGANIZED HEALTH CARE EDUCATION/TRAINING PROGRAM

## 2025-06-19 PROCEDURE — 85025 COMPLETE CBC W/AUTO DIFF WBC: CPT | Performed by: NURSE PRACTITIONER

## 2025-06-19 PROCEDURE — 80048 BASIC METABOLIC PNL TOTAL CA: CPT | Performed by: NURSE PRACTITIONER

## 2025-06-19 PROCEDURE — 85730 THROMBOPLASTIN TIME PARTIAL: CPT

## 2025-06-19 PROCEDURE — 83735 ASSAY OF MAGNESIUM: CPT | Performed by: NURSE PRACTITIONER

## 2025-06-19 RX ORDER — HEPARIN SODIUM 5000 [USP'U]/ML
7500 INJECTION, SOLUTION INTRAVENOUS; SUBCUTANEOUS ONCE
Status: DISCONTINUED | OUTPATIENT
Start: 2025-06-19 | End: 2025-06-19

## 2025-06-19 RX ORDER — HEPARIN SODIUM 1000 [USP'U]/ML
7500 INJECTION, SOLUTION INTRAVENOUS; SUBCUTANEOUS ONCE
Status: COMPLETED | OUTPATIENT
Start: 2025-06-19 | End: 2025-06-19

## 2025-06-19 RX ORDER — POTASSIUM CHLORIDE 14.9 MG/ML
20 INJECTION INTRAVENOUS ONCE
Status: COMPLETED | OUTPATIENT
Start: 2025-06-19 | End: 2025-06-20

## 2025-06-19 RX ORDER — POTASSIUM CHLORIDE 1500 MG/1
40 TABLET, EXTENDED RELEASE ORAL ONCE
Status: COMPLETED | OUTPATIENT
Start: 2025-06-19 | End: 2025-06-19

## 2025-06-19 RX ORDER — POTASSIUM CHLORIDE 14.9 MG/ML
20 INJECTION INTRAVENOUS ONCE
Status: COMPLETED | OUTPATIENT
Start: 2025-06-19 | End: 2025-06-19

## 2025-06-19 RX ADMIN — PREDNISONE 5 MG: 5 TABLET ORAL at 08:35

## 2025-06-19 RX ADMIN — SODIUM CHLORIDE 12 MMOL: 9 INJECTION, SOLUTION INTRAVENOUS at 03:40

## 2025-06-19 RX ADMIN — POTASSIUM CHLORIDE 20 MEQ: 14.9 INJECTION, SOLUTION INTRAVENOUS at 03:22

## 2025-06-19 RX ADMIN — HEPARIN SODIUM 25 UNITS/KG/HR: 10000 INJECTION, SOLUTION INTRAVENOUS at 23:05

## 2025-06-19 RX ADMIN — ALBUTEROL SULFATE 2 PUFF: 90 AEROSOL, METERED RESPIRATORY (INHALATION) at 09:25

## 2025-06-19 RX ADMIN — ATORVASTATIN CALCIUM 40 MG: 40 TABLET, FILM COATED ORAL at 18:09

## 2025-06-19 RX ADMIN — Medication 4.5 G: at 10:00

## 2025-06-19 RX ADMIN — HEPARIN SODIUM 26 UNITS/KG/HR: 10000 INJECTION, SOLUTION INTRAVENOUS at 08:35

## 2025-06-19 RX ADMIN — VANCOMYCIN HYDROCHLORIDE 1250 MG: 1 INJECTION, POWDER, LYOPHILIZED, FOR SOLUTION INTRAVENOUS at 11:31

## 2025-06-19 RX ADMIN — BUDESONIDE, GLYCOPYRROLATE, AND FORMOTEROL FUMARATE 2 PUFF: 160; 9; 4.8 AEROSOL, METERED RESPIRATORY (INHALATION) at 08:35

## 2025-06-19 RX ADMIN — SODIUM CHLORIDE 6 UNITS/HR: 9 INJECTION, SOLUTION INTRAVENOUS at 14:56

## 2025-06-19 RX ADMIN — BUDESONIDE, GLYCOPYRROLATE, AND FORMOTEROL FUMARATE 2 PUFF: 160; 9; 4.8 AEROSOL, METERED RESPIRATORY (INHALATION) at 22:09

## 2025-06-19 RX ADMIN — POTASSIUM CHLORIDE 20 MEQ: 14.9 INJECTION, SOLUTION INTRAVENOUS at 04:38

## 2025-06-19 RX ADMIN — Medication 4.5 G: at 03:00

## 2025-06-19 RX ADMIN — HEPARIN SODIUM 7500 UNITS: 1000 INJECTION INTRAVENOUS; SUBCUTANEOUS at 12:33

## 2025-06-19 RX ADMIN — MONTELUKAST 10 MG: 10 TABLET, FILM COATED ORAL at 22:09

## 2025-06-19 RX ADMIN — Medication 4.5 G: at 18:09

## 2025-06-19 RX ADMIN — POTASSIUM CHLORIDE 40 MEQ: 1500 TABLET, EXTENDED RELEASE ORAL at 08:35

## 2025-06-19 RX ADMIN — Medication 1 TABLET: at 08:35

## 2025-06-19 RX ADMIN — FAMOTIDINE 20 MG: 20 TABLET, FILM COATED ORAL at 08:35

## 2025-06-19 NOTE — PROGRESS NOTES
Progress Note - Hospitalist   Name: Gold Van 79 y.o. male I MRN: [id number]  Unit/Bed#: ICU 02-01 I Date of Admission: 6/17/2025   Date of Service: 6/19/2025 I Hospital Day: 2    Assessment & Plan  Septic shock (Formerly [facility])  SHOCK STATE RESOLVED  Source: RLE osteo  Hx serratia/pseudomonas/e faecalis wound infection in past  BC P  MRI R foot-Postsurgical changes of partial fourth and fifth ray resections with a soft tissue ulcer overlying the cut surfaces of the fourth and fifth metatarsals and findings concerning for early osteomyelitis in the residual fourth metatarsal. No definite MRI evidence of osteomyelitis. The ulcer is also in close approximation to the distal third metatarsal, and early osteomyelitis in that location cannot be excluded. No evidence of an abscess.   Podiatry consulted  Zofabrizion/Vanco D3  OR Friday with Podiatry  Type 2 diabetes mellitus with complication, without long-term current use of insulin (Formerly [facility])  Lab Results   Component Value Date    HGBA1C 9.3 (H) 04/09/2025       Recent Labs     06/19/25  0135 06/19/25  0356 06/19/25  0559 06/19/25  1057   POCGLU 144* 115 122 246*       Blood Sugar Average: Last 72 hrs:  (P) [id number]  Holding home glipizide and metformin  Insulin gtt  Essential hypertension  Holding home atenolol/lisinopril in post shock state  Mixed hyperlipidemia  Cont home statin  Acute renal failure superimposed on stage 2 chronic kidney disease  (Formerly [facility])  Lab Results   Component Value Date    EGFR 67 06/19/2025    EGFR 58 06/18/2025    EGFR 53 06/18/2025    CREATININE 1.04 06/19/2025    CREATININE 1.17 06/18/2025    CREATININE 1.26 06/18/2025   BAseline creat 0.8-1.1  UA 2+glucose 1+ protein 4-10 WBC  Ck 101  Likely prerenal in setting of shock state  Bmp in am  Hyponatremia  Chronically 130-134  Stable  Bmp in am  Pruritic condition  Home prednisone  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Formerly [facility])  Home inhaler   CVA (cerebrovascular accident) (Formerly [facility])  No residual deficits  Home statin  Holding  plavix with OR friday  Gastroesophageal reflux disease without esophagitis  Home ppi  Moderate protein-calorie malnutrition (HCC)  Malnutrition Findings:        Nutrition following                       BMI Findings:           Body mass index is 19.85 kg/m².     Severe peripheral arterial disease (HCC)  Hx R SFA stent 5/13  Hold plavix with OR friday  PAF (paroxysmal atrial fibrillation) (HCC)  Hold home atenolol with shock state resolving  Hold home Elquis--hep gtt for now with plan for OR friday  Anemia  Iron counts low and sat  Hold on Iron given septic state  1 UPRBC 6/18  Transfuse hgb <7  No s/s acute  bleeding --dilutional with volume resuscitation  6/19: Hemoglobin at 7.1  Monitor CBC  Pressure injury of skin of sacral region  Wound care  Metabolic acidosis  Stable 20  LA cleared  Bmp in am  Ambulatory dysfunction  RLE NWB    VTE Pharmacologic Prophylaxis: VTE Score: 3 High Risk (Score >/= 5) - Pharmacological DVT Prophylaxis Ordered: heparin drip. Sequential Compression Devices Ordered.    Mobility:   Basic Mobility Inpatient Raw Score: 9  -HL Goal: 3: Sit at edge of bed  JH-HLM Achieved: 4: Move to chair/commode  JH-HLM Goal achieved. Continue to encourage appropriate mobility.    Patient Centered Rounds: I performed bedside rounds with nursing staff today.   Discussions with Specialists or Other Care Team Provider: Yes    Education and Discussions with Family / Patient: Patient will update wife.     Current Length of Stay: 2 day(s)  Current Patient Status: Inpatient   Certification Statement: The patient will continue to require additional inpatient hospital stay due to plan OR for Friday 6/20  Discharge Plan: Anticipate discharge in >72 hrs to discharge location to be determined pending rehab evaluations.    Code Status: Level 1 - Full Code    Subjective   Patient seen and examined in bed eating breakfast.  Alert and oriented x 3.  Verbalized knowledge of OR schedule for tomorrow for TMA.  Offers no  acute complaints    Objective :  Temp:  [97 °F (36.1 °C)-98.4 °F (36.9 °C)] 97.9 °F (36.6 °C)  HR:  [65-91] 85  BP: (101-137)/(53-69) 137/63  Resp:  [16-29] 20  SpO2:  [94 %-100 %] 97 %  O2 Device: None (Room air)    Body mass index is 19.85 kg/m².     Input and Output Summary (last 24 hours):     Intake/Output Summary (Last 24 hours) at 6/19/2025 1242  Last data filed at 6/19/2025 0625  Gross per 24 hour   Intake 1031.97 ml   Output 900 ml   Net 131.97 ml       Physical Exam  Vitals and nursing note reviewed.   Constitutional:       General: He is not in acute distress.     Appearance: He is well-developed.   HENT:      Head: Normocephalic and atraumatic.     Eyes:      Conjunctiva/sclera: Conjunctivae normal.       Cardiovascular:      Rate and Rhythm: Normal rate.      Heart sounds: No murmur heard.  Pulmonary:      Effort: Pulmonary effort is normal. No respiratory distress.      Breath sounds: Normal breath sounds.   Abdominal:      Palpations: Abdomen is soft.      Tenderness: There is no abdominal tenderness.     Musculoskeletal:         General: No swelling.      Cervical back: Neck supple.      Comments: Dressing CDI     Skin:     General: Skin is warm and dry.      Capillary Refill: Capillary refill takes less than 2 seconds.     Neurological:      Mental Status: He is alert and oriented to person, place, and time.     Psychiatric:         Mood and Affect: Mood normal.           Lines/Drains:  Lines/Drains/Airways       Active Status       Name Placement date Placement time Site Days    CVC Central Lines 06/17/25 Triple Right 06/17/25  1500  --  1                    Central Line:  Goal for removal: Will discontinue when peripheral access obtained.          Telemetry:  Telemetry Orders (From admission, onward)               24 Hour Telemetry Monitoring  Continuous x 24 Hours (Telem)        Expiring   Question:  Reason for 24 Hour Telemetry  Answer:  Metabolic/electrolyte disturbance with high probability of  dysrhythmia. K level <3 or >6 OR KCL infusion >10mEq/hr                     Telemetry Reviewed: Normal Sinus Rhythm  Indication for Continued Telemetry Use: Arrthymias requiring medical therapy               Lab Results: I have reviewed the following results:   Results from last 7 days   Lab Units 06/19/25  0401 06/18/25  0431 06/17/25  1112   WBC Thousand/uL 10.14   < > 17.93*   HEMOGLOBIN g/dL 7.1*   < > 8.8*   HEMATOCRIT % 22.5*   < > 28.4*   PLATELETS Thousands/uL 232   < > 361   BANDS PCT %  --   --  23*   SEGS PCT % 88*  --   --    LYMPHO PCT % 7*  --  0*   MONO PCT % 4  --  1*   EOS PCT % 0  --  0    < > = values in this interval not displayed.     Results from last 7 days   Lab Units 06/19/25  0401 06/18/25  1154 06/18/25  0431   SODIUM mmol/L 136   < > 134*   POTASSIUM mmol/L 3.2*   < > 3.3*   CHLORIDE mmol/L 108   < > 105   CO2 mmol/L 20*   < > 20*   BUN mg/dL 25   < > 28*   CREATININE mg/dL 1.04   < > 1.39*   ANION GAP mmol/L 8   < > 9   CALCIUM mg/dL 8.0*   < > 8.4   ALBUMIN g/dL  --   --  2.9*   TOTAL BILIRUBIN mg/dL  --   --  0.27   ALK PHOS U/L  --   --  43   ALT U/L  --   --  10   AST U/L  --   --  16   GLUCOSE RANDOM mg/dL 131   < > 143*    < > = values in this interval not displayed.     Results from last 7 days   Lab Units 06/18/25  0431   INR  2.68*     Results from last 7 days   Lab Units 06/19/25  1057 06/19/25  0559 06/19/25  0356 06/19/25  0135 06/18/25  2342 06/18/25  2154 06/18/25  2006 06/18/25  1809 06/18/25  1600 06/18/25  1411 06/18/25  1205 06/18/25  1005   POC GLUCOSE mg/dl 246* 122 115 144* 151* 167* 199* 175* 174* 168* 167* 221*         Results from last 7 days   Lab Units 06/19/25  0351 06/18/25  0431 06/18/25  0008 06/17/25  2135 06/17/25  1347 06/17/25  1112   LACTIC ACID mmol/L  --   --  1.6 2.1* 3.7* 3.7*   PROCALCITONIN ng/ml 18.42* 27.46*  --   --   --  14.91*       Recent Cultures (last 7 days):   Results from last 7 days   Lab Units 06/17/25  1112   BLOOD CULTURE   Staphylococcus aureus*  No Growth at 24 hrs.   GRAM STAIN RESULT  Gram positive cocci in clusters*             Last 24 Hours Medication List:     Current Facility-Administered Medications:     acetaminophen (TYLENOL) tablet 650 mg, Q6H PRN    albuterol (PROVENTIL HFA,VENTOLIN HFA) inhaler 2 puff, Q4H PRN    albuterol inhalation solution 2.5 mg, Q6H PRN    atorvastatin (LIPITOR) tablet 40 mg, QPM    Budeson-Glycopyrrol-Formoterol 160-9-4.8 MCG/ACT AERO 2 puff, Q12H SANDRA    famotidine (PEPCID) tablet 20 mg, Daily    heparin (porcine) 25,000 units in 0.45% NaCl 250 mL infusion (premix), Titrated, Last Rate: 26 Units/kg/hr (06/19/25 0835)    insulin regular (HumuLIN R,NovoLIN R) 1 Units/mL in sodium chloride 0.9 % 100 mL infusion, Titrated, Last Rate: 8 Units/hr (06/19/25 1232)    montelukast (SINGULAIR) tablet 10 mg, HS    multivitamin-minerals (CENTRUM) tablet 1 tablet, Daily    ondansetron (ZOFRAN) injection 4 mg, Q6H PRN    [COMPLETED] piperacillin-tazobactam (ZOSYN) 4.5 g in sodium chloride 0.9 % 100 mL IV LOADING DOSE, Once, Last Rate: 4.5 g (06/17/25 1600) **FOLLOWED BY** piperacillin-tazobactam (ZOSYN) 4.5 g in sodium chloride 0.9 % 100 mL IVPB (EXTENDED INFUSION), Q8H, Last Rate: 4.5 g (06/19/25 1000)    polyethylene glycol (MIRALAX) packet 17 g, Daily PRN    predniSONE tablet 5 mg, Daily    senna-docusate sodium (SENOKOT S) 8.6-50 mg per tablet 2 tablet, HS    vancomycin (VANCOCIN) 1,250 mg in sodium chloride 0.9 % 250 mL IVPB, Q24H, Last Rate: 1,250 mg (06/19/25 1131)    Administrative Statements   Today, Patient Was Seen By: BELGICA Beckman      **Please Note: This note may have been constructed using a voice recognition system.**

## 2025-06-19 NOTE — PLAN OF CARE
Problem: PAIN - ADULT  Goal: Verbalizes/displays adequate comfort level or baseline comfort level  Description: Interventions:  - Encourage patient to monitor pain and request assistance  - Assess pain using appropriate pain scale  - Administer analgesics as ordered based on type and severity of pain and evaluate response  - Implement non-pharmacological measures as appropriate and evaluate response  - Consider cultural and social influences on pain and pain management  - Notify physician/advanced practitioner if interventions unsuccessful or patient reports new pain  - Educate patient/family on pain management process including their role and importance of  reporting pain   - Provide non-pharmacologic/complimentary pain relief interventions  Outcome: Progressing     Problem: INFECTION - ADULT  Goal: Absence or prevention of progression during hospitalization  Description: INTERVENTIONS:  - Assess and monitor for signs and symptoms of infection  - Monitor lab/diagnostic results  - Monitor all insertion sites, i.e. indwelling lines, tubes, and drains  - Monitor endotracheal if appropriate and nasal secretions for changes in amount and color  - [place] appropriate cooling/warming therapies per order  - Administer medications as ordered  - Instruct and encourage patient and family to use good hand hygiene technique  - Identify and instruct in appropriate isolation precautions for identified infection/condition  Outcome: Progressing  Goal: Absence of fever/infection during neutropenic period  Description: INTERVENTIONS:  - Monitor WBC  - Perform strict hand hygiene  - Limit to healthy visitors only  - No plants, dried, fresh or silk flowers with otoole in patient room  Outcome: Progressing     Problem: SAFETY ADULT  Goal: Patient will remain free of falls  Description: INTERVENTIONS:  - Educate patient/family on patient safety including physical limitations  - Instruct patient to call for assistance with activity   -  Consider consulting OT/PT to assist with strengthening/mobility based on AM PAC & JH-HLM score  - Consult OT/PT to assist with strengthening/mobility   - Keep Call bell within reach  - Keep bed low and locked with side rails adjusted as appropriate  - Keep care items and personal belongings within reach  - Initiate and maintain comfort rounds  - Make Fall Risk Sign visible to staff  - Offer Toileting every Hours, in advance of need  - Initiate/Maintain alarm  - Obtain necessary fall risk management equipment:   - Apply yellow socks and bracelet for high fall risk patients  - Consider moving patient to room near nurses station  Outcome: Progressing     Problem: Knowledge Deficit  Goal: Patient/family/caregiver demonstrates understanding of disease process, treatment plan, medications, and discharge instructions  Description: Complete learning assessment and assess knowledge base.  Interventions:  - Provide teaching at level of understanding  - Provide teaching via preferred learning methods  Outcome: Progressing     Problem: Nutrition/Hydration-ADULT  Goal: Nutrient/Hydration intake appropriate for improving, restoring or maintaining nutritional needs  Description: Monitor and assess patient's nutrition/hydration status for malnutrition. Collaborate with interdisciplinary team and initiate plan and interventions as ordered.  Monitor patient's weight and dietary intake as ordered or per policy. Utilize nutrition screening tool and intervene as necessary. Determine patient's food preferences and provide high-protein, high-caloric foods as appropriate.     INTERVENTIONS:  - Monitor oral intake, urinary output, labs, and treatment plans  - Assess nutrition and hydration status and recommend course of action  - Evaluate amount of meals eaten  - Assist patient with eating if necessary   - Allow adequate time for meals  - Recommend/ encourage appropriate diets, oral nutritional supplements, and vitamin/mineral  supplements  - Order, calculate, and assess calorie counts as needed  - Recommend, monitor, and adjust tube feedings and TPN/PPN based on assessed needs  - Assess need for intravenous fluids  - Provide specific nutrition/hydration education as appropriate  - Include patient/family/caregiver in decisions related to nutrition  Outcome: Progressing     Problem: CARDIOVASCULAR - ADULT  Goal: Maintains optimal cardiac output and hemodynamic stability  Description: INTERVENTIONS:  - Monitor I/O, vital signs and rhythm  - Monitor for S/S and trends of decreased cardiac output  - Administer and titrate ordered vasoactive medications to optimize hemodynamic stability  - Assess quality of pulses, skin color and temperature  - Assess for signs of decreased coronary artery perfusion  - Instruct patient to report change in severity of symptoms  Outcome: Progressing  Goal: Absence of cardiac dysrhythmias or at baseline rhythm  Description: INTERVENTIONS:  - Continuous cardiac monitoring, vital signs, obtain 12 lead EKG if ordered  - Administer antiarrhythmic and heart rate control medications as ordered  - Monitor electrolytes and administer replacement therapy as ordered  Outcome: Progressing     Problem: RESPIRATORY - ADULT  Goal: Achieves optimal ventilation and oxygenation  Description: INTERVENTIONS:  - Assess for changes in respiratory status  - Assess for changes in mentation and behavior  - Position to facilitate oxygenation and minimize respiratory effort  - Oxygen administered by appropriate delivery if ordered  - Initiate smoking cessation education as indicated  - Encourage broncho-pulmonary hygiene including cough, deep breathe, Incentive Spirometry  - Assess the need for suctioning and aspirate as needed  - Assess and instruct to report SOB or any respiratory difficulty  - Respiratory Therapy support as indicated  Outcome: Progressing     Problem: GASTROINTESTINAL - ADULT  Goal: Minimal or absence of nausea and/or  vomiting  Description: INTERVENTIONS:  - Administer IV fluids if ordered to ensure adequate hydration  - Maintain NPO status until nausea and vomiting are resolved  - Nasogastric tube if ordered  - Administer ordered antiemetic medications as needed  - Provide nonpharmacologic comfort measures as appropriate  - Advance diet as tolerated, if ordered  - Consider nutrition services referral to assist patient with adequate nutrition and appropriate food choices  Outcome: Progressing  Goal: Maintains or returns to baseline bowel function  Description: INTERVENTIONS:  - Assess bowel function  - Encourage oral fluids to ensure adequate hydration  - Administer IV fluids if ordered to ensure adequate hydration  - Administer ordered medications as needed  - Encourage mobilization and activity  - Consider nutritional services referral to assist patient with adequate nutrition and appropriate food choices  Outcome: Progressing  Goal: Maintains adequate nutritional intake  Description: INTERVENTIONS:  - Monitor percentage of each meal consumed  - Identify factors contributing to decreased intake, treat as appropriate  - Assist with meals as needed  - Monitor I&O, weight, and lab values if indicated  - Obtain nutrition services referral as needed  Outcome: Progressing  Goal: Establish and maintain optimal ostomy function  Description: INTERVENTIONS:  - Assess bowel function  - Encourage oral fluids to ensure adequate hydration  - Administer IV fluids if ordered to ensure adequate hydration   - Administer ordered medications as needed  - Encourage mobilization and activity  - Nutrition services referral to assist patient with appropriate food choices  - Assess stoma site  - Consider wound care consult   Outcome: Progressing  Goal: Oral mucous membranes remain intact  Description: INTERVENTIONS  - Assess oral mucosa and hygiene practices  - Implement preventative oral hygiene regimen  - Implement oral medicated treatments as  ordered  - Initiate Nutrition services referral as needed  Outcome: Progressing     Problem: GENITOURINARY - ADULT  Goal: Maintains or returns to baseline urinary function  Description: INTERVENTIONS:  - Assess urinary function  - Encourage oral fluids to ensure adequate hydration if ordered  - Administer IV fluids as ordered to ensure adequate hydration  - Administer ordered medications as needed  - Offer frequent toileting  - Follow urinary retention protocol if ordered  Outcome: Progressing  Goal: Absence of urinary retention  Description: INTERVENTIONS:  - Assess patient’s ability to void and empty bladder  - Monitor I/O  - Bladder scan as needed  - Discuss with physician/AP medications to alleviate retention as needed  - Discuss catheterization for long term situations as appropriate  Outcome: Progressing  Goal: Urinary catheter remains patent  Description: INTERVENTIONS:  - Assess patency of urinary catheter  - If patient has a chronic schmitt, consider changing catheter if non-functioning  - Follow guidelines for intermittent irrigation of non-functioning urinary catheter  Outcome: Progressing     Problem: METABOLIC, FLUID AND ELECTROLYTES - ADULT  Goal: Electrolytes maintained within normal limits  Description: INTERVENTIONS:  - Monitor labs and assess patient for signs and symptoms of electrolyte imbalances  - Administer electrolyte replacement as ordered  - Monitor response to electrolyte replacements, including repeat lab results as appropriate  - Instruct patient on fluid and nutrition as appropriate  Outcome: Progressing  Goal: Fluid balance maintained  Description: INTERVENTIONS:  - Monitor labs   - Monitor I/O and WT  - Instruct patient on fluid and nutrition as appropriate  - Assess for signs & symptoms of volume excess or deficit  Outcome: Progressing  Goal: Glucose maintained within target range  Description: INTERVENTIONS:  - Monitor Blood Glucose as ordered  - Assess for signs and symptoms of  hyperglycemia and hypoglycemia  - Administer ordered medications to maintain glucose within target range  - Assess nutritional intake and initiate nutrition service referral as needed  Outcome: Progressing     Problem: SKIN/TISSUE INTEGRITY - ADULT  Goal: Skin Integrity remains intact(Skin Breakdown Prevention)  Description: Assess:  -Perform Sae assessment every   -Clean and moisturize skin every   -Inspect skin when repositioning, toileting, and assisting with ADLS  -Assess under medical devices such as  every   -Assess extremities for adequate circulation and sensation     Bed Management:  -Have minimal linens on bed & keep smooth, unwrinkled  -Change linens as needed when moist or perspiring  -Avoid sitting or lying in one position for more than  hours while in bed  -Keep HOB at degrees     Toileting:  -Offer bedside commode  -Assess for incontinence every   -Use incontinent care products after each incontinent episode such as     Activity:  -Mobilize patient  times a day  -Encourage activity and walks on unit  -Encourage or provide ROM exercises   -Turn and reposition patient every  Hours  -Use appropriate equipment to lift or move patient in bed  -Instruct/ Assist with weight shifting every  when out of bed in chair  -Consider limitation of chair time  hour intervals    Skin Care:  -Avoid use of baby powder, tape, friction and shearing, hot water or constrictive clothing  -Relieve pressure over bony prominences using   -Do not massage red bony areas    Next Steps:  -Teach patient strategies to minimize risks such as    -Consider consults to  interdisciplinary teams such as   Outcome: Progressing  Goal: Incision(s), wounds(s) or drain site(s) healing without S/S of infection  Description: INTERVENTIONS  - Assess and document dressing, incision, wound bed, drain sites and surrounding tissue  - Provide patient and family education  - Perform skin care/dressing changes every   Outcome: Progressing  Goal:  Pressure injury heals and does not worsen  Description: Interventions:  - Implement low air loss mattress or specialty surface (Criteria met)  - Apply silicone foam dressing  - Instruct/assist with weight shifting every  minutes when in chair   - Limit chair time to  hour intervals  - Use special pressure reducing interventions such as  when in chair   - Apply fecal or urinary incontinence containment device   - Perform passive or active ROM every   - Turn and reposition patient & offload bony prominences every  hours   - Utilize friction reducing device or surface for transfers   - Consider consults to  interdisciplinary teams such as   - Use incontinent care products after each incontinent episode such as  - Consider nutrition services referral as needed  Outcome: Progressing

## 2025-06-19 NOTE — OCCUPATIONAL THERAPY NOTE
Occupational Therapy Cancellation Note     06/19/25 1323   OT Last Visit   OT Visit Date 06/19/25   Note Type   Note type Cancelled Session   Cancel Reasons Other   Additional Comments OR tomorrow 6/20/2025 for TMA - will follow-up after with NWB status     OT orders received. Chart reviewed. Will follow-up as able and appropriate    Marcella Redd OTR/L

## 2025-06-19 NOTE — UTILIZATION REVIEW
NOTIFICATION OF INPATIENT ADMISSION   AUTHORIZATION REQUEST   SERVICING FACILITY:   [place] [address]  Tax ID: [id number]  NPI: [id number]   ATTENDING PROVIDER:  Attending Name and NPI#: Izzy De La Garza Do [[id number]]  Address: [address] [place] [address]  Phone: [phone]     ADMISSION INFORMATION:  Place of Service: Inpatient Acute [facility]  Place of Service Code: 21  Inpatient Admission Date/Time: 6/17/25 12:51 PM  Discharge Date/Time: No discharge date for patient encounter.  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     PHYSICIAN ADVISORY SERVICES:  Medical Necessity Denial & Jiyd-up-[place] Review  Phone: [phone]  Fax: [phone]  Email: [email or web address]     DISCHARGE SUPPORT TEAM:  For Patients Discharge Needs & Updates  Phone: [phone] opt. 2 Fax: [phone]  Email: [email or web address]

## 2025-06-19 NOTE — PROGRESS NOTES
Gold Van is a 79 y.o. male who is currently ordered Vancomycin IV with management by the Pharmacy Consult service.  Relevant clinical data and objective / subjective history reviewed.  Vancomycin Assessment:  Indication and Goal AUC/Trough: Bone/joint infection (goal -600, trough >10)  Clinical Status: stable  Micro:     Renal Function:  SCr: 1.04 mg/dL  CrCl: 53.5 mL/min  Renal replacement: Not on dialysis  Days of Therapy: 3  Current Dose: 1000mg IV q24h  Vancomycin Plan:  New Dosinmg IV q24h  Estimated AUC: 492 mcg*hr/mL  Estimated Trough: 12.1 mcg/mL  Next Level: 6/20 AM labs  Renal Function Monitoring: Daily BMP and UOP  Pharmacy will continue to follow closely for s/sx of nephrotoxicity, infusion reactions and appropriateness of therapy.  BMP and CBC will be ordered per protocol. We will continue to follow the patient’s culture results and clinical progress daily.    Maged Davila, Pharmacist

## 2025-06-19 NOTE — PLAN OF CARE
Problem: Prexisting or High Potential for Compromised Skin Integrity  Goal: Skin integrity is maintained or improved  Description: INTERVENTIONS:  - Identify patients at risk for skin breakdown  - Assess and monitor skin integrity including under and around medical devices   - Assess and monitor nutrition and hydration status  - Monitor labs  - Assess for incontinence   - Turn and reposition patient  - Assist with mobility/ambulation  - Relieve pressure over joycelyn prominences   - Avoid friction and shearing  - Provide appropriate hygiene as needed including keeping skin clean and dry  - Evaluate need for skin moisturizer/barrier cream  - Collaborate with interdisciplinary team  - Patient/family teaching  - Consider wound care consult    Assess:  - Review Sae scale daily  - Clean and moisturize skin every shift  - Inspect skin when repositioning, toileting, and assisting with ADLS  - Assess under medical devices   - Assess extremities for adequate circulation and sensation     Bed Management:  - Have minimal linens on bed & keep smooth, unwrinkled  - Change linens as needed when moist or perspiring  - Avoid sitting or lying in one position for more than 2 hours while in bed?Keep HOB at 30 degrees   - Toileting:  - Offer bedside commode  - Assess for incontinence   - Use incontinent care products after each incontinent episode     Activity:  - Mobilize patient 3 times a day  - Encourage activity and walks on unit  - Encourage or provide ROM exercises   - Turn and reposition patient every 2 Hours  - Use appropriate equipment to lift or move patient in bed  - Instruct/ Assist with weight shifting every 60 when out of bed in chair  - Consider limitation of chair time 2 hour intervals    Skin Care:  - Avoid use of baby powder, tape, friction and shearing, hot water or constrictive clothing  - Relieve pressure over bony prominences using mepelex  - Do not massage red bony areas    Next Steps:  - Teach patient  strategies to minimize risks  - Consider consults to  interdisciplinary teams   Outcome: Progressing     Problem: PAIN - ADULT  Goal: Verbalizes/displays adequate comfort level or baseline comfort level  Description: Interventions:  - Encourage patient to monitor pain and request assistance  - Assess pain using appropriate pain scale  - Administer analgesics as ordered based on type and severity of pain and evaluate response  - Implement non-pharmacological measures as appropriate and evaluate response  - Consider cultural and social influences on pain and pain management  - Notify physician/advanced practitioner if interventions unsuccessful or patient reports new pain  - Educate patient/family on pain management process including their role and importance of  reporting pain   - Provide non-pharmacologic/complimentary pain relief interventions  Outcome: Progressing     Problem: INFECTION - ADULT  Goal: Absence or prevention of progression during hospitalization  Description: INTERVENTIONS:  - Assess and monitor for signs and symptoms of infection  - Monitor lab/diagnostic results  - Monitor all insertion sites, i.e. indwelling lines, tubes, and drains  - Monitor endotracheal if appropriate and nasal secretions for changes in amount and color  - [place] appropriate cooling/warming therapies per order  - Administer medications as ordered  - Instruct and encourage patient and family to use good hand hygiene technique  - Identify and instruct in appropriate isolation precautions for identified infection/condition  Outcome: Progressing  Goal: Absence of fever/infection during neutropenic period  Description: INTERVENTIONS:  - Monitor WBC  - Perform strict hand hygiene  - Limit to healthy visitors only  - No plants, dried, fresh or silk flowers with otoole in patient room  Outcome: Progressing

## 2025-06-20 ENCOUNTER — ANESTHESIA EVENT (INPATIENT)
Dept: PERIOP | Facility: HOSPITAL | Age: 80
DRG: 853 | End: 2025-06-20
Payer: COMMERCIAL

## 2025-06-20 ENCOUNTER — ANESTHESIA (INPATIENT)
Dept: PERIOP | Facility: HOSPITAL | Age: 80
DRG: 853 | End: 2025-06-20
Payer: COMMERCIAL

## 2025-06-20 LAB
ANION GAP SERPL CALCULATED.3IONS-SCNC: 7 MMOL/L (ref 4–13)
APTT PPP: 58 SECONDS (ref 23–34)
APTT PPP: 59 SECONDS (ref 23–34)
APTT PPP: 63 SECONDS (ref 23–34)
BACTERIA BLD CULT: ABNORMAL
BUN SERPL-MCNC: 14 MG/DL (ref 5–25)
CALCIUM SERPL-MCNC: 8.2 MG/DL (ref 8.4–10.2)
CHLORIDE SERPL-SCNC: 107 MMOL/L (ref 96–108)
CO2 SERPL-SCNC: 23 MMOL/L (ref 21–32)
CREAT SERPL-MCNC: 0.88 MG/DL (ref 0.6–1.3)
ERYTHROCYTE [DISTWIDTH] IN BLOOD BY AUTOMATED COUNT: 14.9 % (ref 11.6–15.1)
EST. AVERAGE GLUCOSE BLD GHB EST-MCNC: 143 MG/DL
GFR SERPL CREATININE-BSD FRML MDRD: 81 ML/MIN/1.73SQ M
GLUCOSE SERPL-MCNC: 104 MG/DL (ref 65–140)
GLUCOSE SERPL-MCNC: 116 MG/DL (ref 65–140)
GLUCOSE SERPL-MCNC: 116 MG/DL (ref 65–140)
GLUCOSE SERPL-MCNC: 118 MG/DL (ref 65–140)
GLUCOSE SERPL-MCNC: 119 MG/DL (ref 65–140)
GLUCOSE SERPL-MCNC: 119 MG/DL (ref 65–140)
GLUCOSE SERPL-MCNC: 121 MG/DL (ref 65–140)
GLUCOSE SERPL-MCNC: 125 MG/DL (ref 65–140)
GLUCOSE SERPL-MCNC: 126 MG/DL (ref 65–140)
GLUCOSE SERPL-MCNC: 138 MG/DL (ref 65–140)
GLUCOSE SERPL-MCNC: 144 MG/DL (ref 65–140)
GLUCOSE SERPL-MCNC: 172 MG/DL (ref 65–140)
GLUCOSE SERPL-MCNC: 182 MG/DL (ref 65–140)
GLUCOSE SERPL-MCNC: 92 MG/DL (ref 65–140)
GRAM STN SPEC: ABNORMAL
HBA1C MFR BLD: 6.6 %
HCT VFR BLD AUTO: 25 % (ref 36.5–49.3)
HGB BLD-MCNC: 7.8 G/DL (ref 12–17)
MCH RBC QN AUTO: 27.4 PG (ref 26.8–34.3)
MCHC RBC AUTO-ENTMCNC: 31.2 G/DL (ref 31.4–37.4)
MCV RBC AUTO: 88 FL (ref 82–98)
PLATELET # BLD AUTO: 263 THOUSANDS/UL (ref 149–390)
PMV BLD AUTO: 9.4 FL (ref 8.9–12.7)
POTASSIUM SERPL-SCNC: 3.9 MMOL/L (ref 3.5–5.3)
RBC # BLD AUTO: 2.85 MILLION/UL (ref 3.88–5.62)
S AUREUS DNA BLD POS QL NAA+NON-PROBE: DETECTED
SODIUM SERPL-SCNC: 137 MMOL/L (ref 135–147)
VANCOMYCIN SERPL-MCNC: 10.5 UG/ML (ref 10–20)
WBC # BLD AUTO: 8.59 THOUSAND/UL (ref 4.31–10.16)

## 2025-06-20 PROCEDURE — 80202 ASSAY OF VANCOMYCIN: CPT | Performed by: NURSE PRACTITIONER

## 2025-06-20 PROCEDURE — 82948 REAGENT STRIP/BLOOD GLUCOSE: CPT

## 2025-06-20 PROCEDURE — 94760 N-INVAS EAR/PLS OXIMETRY 1: CPT

## 2025-06-20 PROCEDURE — 85027 COMPLETE CBC AUTOMATED: CPT

## 2025-06-20 PROCEDURE — NC001 PR NO CHARGE: Performed by: PODIATRIST

## 2025-06-20 PROCEDURE — 94664 DEMO&/EVAL PT USE INHALER: CPT

## 2025-06-20 PROCEDURE — 99232 SBSQ HOSP IP/OBS MODERATE 35: CPT | Performed by: NURSE PRACTITIONER

## 2025-06-20 PROCEDURE — 85730 THROMBOPLASTIN TIME PARTIAL: CPT | Performed by: INTERNAL MEDICINE

## 2025-06-20 PROCEDURE — 80048 BASIC METABOLIC PNL TOTAL CA: CPT

## 2025-06-20 RX ORDER — HEPARIN SODIUM 1000 [USP'U]/ML
2600 INJECTION, SOLUTION INTRAVENOUS; SUBCUTANEOUS EVERY 6 HOURS PRN
Status: DISCONTINUED | OUTPATIENT
Start: 2025-06-20 | End: 2025-06-23

## 2025-06-20 RX ORDER — HEPARIN SODIUM 1000 [USP'U]/ML
3900 INJECTION, SOLUTION INTRAVENOUS; SUBCUTANEOUS EVERY 6 HOURS PRN
Status: DISCONTINUED | OUTPATIENT
Start: 2025-06-20 | End: 2025-06-20

## 2025-06-20 RX ORDER — HEPARIN SODIUM 1000 [USP'U]/ML
5200 INJECTION, SOLUTION INTRAVENOUS; SUBCUTANEOUS ONCE
Status: COMPLETED | OUTPATIENT
Start: 2025-06-20 | End: 2025-06-20

## 2025-06-20 RX ORDER — HEPARIN SODIUM 1000 [USP'U]/ML
5200 INJECTION, SOLUTION INTRAVENOUS; SUBCUTANEOUS EVERY 6 HOURS PRN
Status: DISCONTINUED | OUTPATIENT
Start: 2025-06-20 | End: 2025-06-23

## 2025-06-20 RX ORDER — HEPARIN SODIUM 1000 [USP'U]/ML
1950 INJECTION, SOLUTION INTRAVENOUS; SUBCUTANEOUS EVERY 6 HOURS PRN
Status: DISCONTINUED | OUTPATIENT
Start: 2025-06-20 | End: 2025-06-20

## 2025-06-20 RX ORDER — HEPARIN SODIUM 10000 [USP'U]/100ML
3-30 INJECTION, SOLUTION INTRAVENOUS
Status: DISPENSED | OUTPATIENT
Start: 2025-06-20 | End: 2025-06-22

## 2025-06-20 RX ORDER — SODIUM CHLORIDE, SODIUM LACTATE, POTASSIUM CHLORIDE, CALCIUM CHLORIDE 600; 310; 30; 20 MG/100ML; MG/100ML; MG/100ML; MG/100ML
125 INJECTION, SOLUTION INTRAVENOUS CONTINUOUS
Status: DISCONTINUED | OUTPATIENT
Start: 2025-06-20 | End: 2025-06-22

## 2025-06-20 RX ORDER — HEPARIN SODIUM 10000 [USP'U]/100ML
3-20 INJECTION, SOLUTION INTRAVENOUS
Status: DISCONTINUED | OUTPATIENT
Start: 2025-06-20 | End: 2025-06-20

## 2025-06-20 RX ORDER — HEPARIN SODIUM 1000 [USP'U]/ML
3900 INJECTION, SOLUTION INTRAVENOUS; SUBCUTANEOUS ONCE
Status: DISCONTINUED | OUTPATIENT
Start: 2025-06-20 | End: 2025-06-20

## 2025-06-20 RX ADMIN — Medication 4.5 G: at 10:19

## 2025-06-20 RX ADMIN — HEPARIN SODIUM 5200 UNITS: 1000 INJECTION, SOLUTION INTRAVENOUS; SUBCUTANEOUS at 20:58

## 2025-06-20 RX ADMIN — ATORVASTATIN CALCIUM 40 MG: 40 TABLET, FILM COATED ORAL at 17:36

## 2025-06-20 RX ADMIN — Medication 4.5 G: at 18:10

## 2025-06-20 RX ADMIN — SODIUM CHLORIDE 0.5 UNITS/HR: 9 INJECTION, SOLUTION INTRAVENOUS at 17:37

## 2025-06-20 RX ADMIN — BUDESONIDE, GLYCOPYRROLATE, AND FORMOTEROL FUMARATE 2 PUFF: 160; 9; 4.8 AEROSOL, METERED RESPIRATORY (INHALATION) at 10:00

## 2025-06-20 RX ADMIN — SODIUM CHLORIDE, SODIUM LACTATE, POTASSIUM CHLORIDE, AND CALCIUM CHLORIDE 125 ML/HR: .6; .31; .03; .02 INJECTION, SOLUTION INTRAVENOUS at 12:59

## 2025-06-20 RX ADMIN — BUDESONIDE, GLYCOPYRROLATE, AND FORMOTEROL FUMARATE 2 PUFF: 160; 9; 4.8 AEROSOL, METERED RESPIRATORY (INHALATION) at 21:08

## 2025-06-20 RX ADMIN — ALBUTEROL SULFATE 2 PUFF: 90 AEROSOL, METERED RESPIRATORY (INHALATION) at 05:22

## 2025-06-20 RX ADMIN — SODIUM CHLORIDE, SODIUM LACTATE, POTASSIUM CHLORIDE, AND CALCIUM CHLORIDE 125 ML/HR: .6; .31; .03; .02 INJECTION, SOLUTION INTRAVENOUS at 17:58

## 2025-06-20 RX ADMIN — VANCOMYCIN HYDROCHLORIDE 1500 MG: 1 INJECTION, POWDER, LYOPHILIZED, FOR SOLUTION INTRAVENOUS at 11:53

## 2025-06-20 RX ADMIN — MONTELUKAST 10 MG: 10 TABLET, FILM COATED ORAL at 21:08

## 2025-06-20 RX ADMIN — HEPARIN SODIUM 27 UNITS/KG/HR: 10000 INJECTION, SOLUTION INTRAVENOUS at 15:36

## 2025-06-20 RX ADMIN — Medication 4.5 G: at 02:05

## 2025-06-20 RX ADMIN — SENNOSIDES AND DOCUSATE SODIUM 2 TABLET: 50; 8.6 TABLET ORAL at 21:08

## 2025-06-20 NOTE — ASSESSMENT & PLAN NOTE
Lab Results   Component Value Date    HGBA1C 9.3 (H) 04/09/2025       Recent Labs     06/20/25  0209 06/20/25  0415 06/20/25  0608 06/20/25  0802   POCGLU 92 121 119 126       Blood Sugar Average: Last 72 hrs:  (P) [id number]    Continue insulin infusion for now   Home glipizide and metformin on hold

## 2025-06-20 NOTE — NURSING NOTE
Patient returned from OR at 1455. Procedure was not completed due to heparin gtt. Family at bedside. Report given to LAURO Dai.

## 2025-06-20 NOTE — ASSESSMENT & PLAN NOTE
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 No definite MRI evidence of osteomyelitis. The ulcer is also in close approximation to the distal third metatarsal, and early osteomyelitis in that location cannot be excluded. No evidence of an abscess  Zosyn/Vanco D4  OR planned for today with Podiatry

## 2025-06-20 NOTE — ASSESSMENT & PLAN NOTE
Malnutrition Findings:        Nutrition following                       BMI Findings:           Body mass index is 19.85 kg/m².      13-Oct-2021 22:57

## 2025-06-20 NOTE — QUICK NOTE
- Heparin drip was still active when he was taken to the OR.  Unfortunately we were not able to complete the procedure.  - I would recommend stopping the heparin drip 6 to 8 hours preprocedure.  - Will plan for surgical intervention on Monday 6/23.  - Hemoglobin from 6/20 was 7.8.  This is up from its lowest on 6/18 at 6.2.  If his hemoglobin remains less than 8 on Sunday, I would also recommend a transfusion if his hemoglobin remains less than 8.

## 2025-06-20 NOTE — PLAN OF CARE
Problem: Prexisting or High Potential for Compromised Skin Integrity  Goal: Skin integrity is maintained or improved  Description: INTERVENTIONS:  - Identify patients at risk for skin breakdown  - Assess and monitor skin integrity including under and around medical devices   - Assess and monitor nutrition and hydration status  - Monitor labs  - Assess for incontinence   - Turn and reposition patient  - Assist with mobility/ambulation  - Relieve pressure over joycelyn prominences   - Avoid friction and shearing  - Provide appropriate hygiene as needed including keeping skin clean and dry  - Evaluate need for skin moisturizer/barrier cream  - Collaborate with interdisciplinary team  - Patient/family teaching  - Consider wound care consult    Assess:  - Review Sae scale daily  - Clean and moisturize skin every shift  - Inspect skin when repositioning, toileting, and assisting with ADLS  - Assess under medical devices   - Assess extremities for adequate circulation and sensation     Bed Management:  - Have minimal linens on bed & keep smooth, unwrinkled  - Change linens as needed when moist or perspiring  - Avoid sitting or lying in one position for more than 2 hours while in bed?Keep HOB at 30 degrees   - Toileting:  - Offer bedside commode  - Assess for incontinence   - Use incontinent care products after each incontinent episode     Activity:  - Mobilize patient 3 times a day  - Encourage activity and walks on unit  - Encourage or provide ROM exercises   - Turn and reposition patient every 2 Hours  - Use appropriate equipment to lift or move patient in bed  - Instruct/ Assist with weight shifting every 60 when out of bed in chair  - Consider limitation of chair time 2 hour intervals    Skin Care:  - Avoid use of baby powder, tape, friction and shearing, hot water or constrictive clothing  - Relieve pressure over bony prominences using mepelex  - Do not massage red bony areas    Next Steps:  - Teach patient  strategies to minimize risks  - Consider consults to  interdisciplinary teams   Outcome: Progressing     Problem: PAIN - ADULT  Goal: Verbalizes/displays adequate comfort level or baseline comfort level  Description: Interventions:  - Encourage patient to monitor pain and request assistance  - Assess pain using appropriate pain scale  - Administer analgesics as ordered based on type and severity of pain and evaluate response  - Implement non-pharmacological measures as appropriate and evaluate response  - Consider cultural and social influences on pain and pain management  - Notify physician/advanced practitioner if interventions unsuccessful or patient reports new pain  - Educate patient/family on pain management process including their role and importance of  reporting pain   - Provide non-pharmacologic/complimentary pain relief interventions  Outcome: Progressing     Problem: INFECTION - ADULT  Goal: Absence or prevention of progression during hospitalization  Description: INTERVENTIONS:  - Assess and monitor for signs and symptoms of infection  - Monitor lab/diagnostic results  - Monitor all insertion sites, i.e. indwelling lines, tubes, and drains  - Monitor endotracheal if appropriate and nasal secretions for changes in amount and color  - [place] appropriate cooling/warming therapies per order  - Administer medications as ordered  - Instruct and encourage patient and family to use good hand hygiene technique  - Identify and instruct in appropriate isolation precautions for identified infection/condition  Outcome: Progressing  Goal: Absence of fever/infection during neutropenic period  Description: INTERVENTIONS:  - Monitor WBC  - Perform strict hand hygiene  - Limit to healthy visitors only  - No plants, dried, fresh or silk flowers with otoole in patient room  Outcome: Progressing     Problem: SAFETY ADULT  Goal: Patient will remain free of falls  Description: INTERVENTIONS:  - Educate patient/family on  patient safety including physical limitations  - Instruct patient to call for assistance with activity   - Consider consulting OT/PT to assist with strengthening/mobility based on AM PAC & JH-HLM score  - Consult OT/PT to assist with strengthening/mobility   - Keep Call bell within reach  - Keep bed low and locked with side rails adjusted as appropriate  - Keep care items and personal belongings within reach  - Initiate and maintain comfort rounds  - Make Fall Risk Sign visible to staff  - Offer Toileting every 2 Hours, in advance of need  - Initiate/Maintain bed alarm  - Obtain necessary fall risk management equipment: walker   - Apply yellow socks and bracelet for high fall risk patients  - Consider moving patient to room near nurses station  Outcome: Progressing  Goal: Maintain or return to baseline ADL function  Description: INTERVENTIONS:  -  Assess patient's ability to carry out ADLs; assess patient's baseline for ADL function and identify physical deficits which impact ability to perform ADLs (bathing, care of mouth/teeth, toileting, grooming, dressing, etc.)  - Assess/evaluate cause of self-care deficits   - Assess range of motion  - Assess patient's mobility; develop plan if impaired  - Assess patient's need for assistive devices and provide as appropriate  - Encourage maximum independence but intervene and supervise when necessary  - Involve family in performance of ADLs  - Assess for home care needs following discharge   - Consider OT consult to assist with ADL evaluation and planning for discharge  - Provide patient education as appropriate  - Monitor functional capacity and physical performance, use of AM PAC & JH-HLM   - Monitor gait, balance and fatigue with ambulation    Outcome: Progressing  Goal: Maintains/Returns to pre admission functional level  Description: INTERVENTIONS:  - Perform AM-PAC 6 Click Basic Mobility/ Daily Activity assessment daily.  - Set and communicate daily mobility goal to  care team and patient/family/caregiver.   - Collaborate with rehabilitation services on mobility goals if consulted  - Perform Range of Motion 3 times a day.  - Reposition patient every 2 hours.  - Dangle patient 3 times a day  - Stand patient 3 times a day  - Ambulate patient 3 times a day  - Out of bed to chair 3 times a day   - Out of bed for meals 3 times a day  - Out of bed for toileting  - Record patient progress and toleration of activity level   Outcome: Progressing     Problem: DISCHARGE PLANNING  Goal: Discharge to home or other facility with appropriate resources  Description: INTERVENTIONS:  - Identify barriers to discharge w/patient and caregiver  - Arrange for needed discharge resources and transportation as appropriate  - Identify discharge learning needs (meds, wound care, etc.)  - Arrange for interpretive services to assist at discharge as needed  - Refer to Case Management Department for coordinating discharge planning if the patient needs post-hospital services based on physician/advanced practitioner order or complex needs related to functional status, cognitive ability, or social support system  Outcome: Progressing     Problem: Knowledge Deficit  Goal: Patient/family/caregiver demonstrates understanding of disease process, treatment plan, medications, and discharge instructions  Description: Complete learning assessment and assess knowledge base.  Interventions:  - Provide teaching at level of understanding  - Provide teaching via preferred learning methods  Outcome: Progressing     Problem: Nutrition/Hydration-ADULT  Goal: Nutrient/Hydration intake appropriate for improving, restoring or maintaining nutritional needs  Description: Monitor and assess patient's nutrition/hydration status for malnutrition. Collaborate with interdisciplinary team and initiate plan and interventions as ordered.  Monitor patient's weight and dietary intake as ordered or per policy. Utilize nutrition screening tool  and intervene as necessary. Determine patient's food preferences and provide high-protein, high-caloric foods as appropriate.     INTERVENTIONS:  - Monitor oral intake, urinary output, labs, and treatment plans  - Assess nutrition and hydration status and recommend course of action  - Evaluate amount of meals eaten  - Assist patient with eating if necessary   - Allow adequate time for meals  - Recommend/ encourage appropriate diets, oral nutritional supplements, and vitamin/mineral supplements  - Order, calculate, and assess calorie counts as needed  - Recommend, monitor, and adjust tube feedings and TPN/PPN based on assessed needs  - Assess need for intravenous fluids  - Provide specific nutrition/hydration education as appropriate  - Include patient/family/caregiver in decisions related to nutrition  Outcome: Progressing     Problem: CARDIOVASCULAR - ADULT  Goal: Maintains optimal cardiac output and hemodynamic stability  Description: INTERVENTIONS:  - Monitor I/O, vital signs and rhythm  - Monitor for S/S and trends of decreased cardiac output  - Administer and titrate ordered vasoactive medications to optimize hemodynamic stability  - Assess quality of pulses, skin color and temperature  - Assess for signs of decreased coronary artery perfusion  - Instruct patient to report change in severity of symptoms  Outcome: Progressing  Goal: Absence of cardiac dysrhythmias or at baseline rhythm  Description: INTERVENTIONS:  - Continuous cardiac monitoring, vital signs, obtain 12 lead EKG if ordered  - Administer antiarrhythmic and heart rate control medications as ordered  - Monitor electrolytes and administer replacement therapy as ordered  Outcome: Progressing     Problem: RESPIRATORY - ADULT  Goal: Achieves optimal ventilation and oxygenation  Description: INTERVENTIONS:  - Assess for changes in respiratory status  - Assess for changes in mentation and behavior  - Position to facilitate oxygenation and minimize  respiratory effort  - Oxygen administered by appropriate delivery if ordered  - Initiate smoking cessation education as indicated  - Encourage broncho-pulmonary hygiene including cough, deep breathe, Incentive Spirometry  - Assess the need for suctioning and aspirate as needed  - Assess and instruct to report SOB or any respiratory difficulty  - Respiratory Therapy support as indicated  Outcome: Progressing     Problem: GASTROINTESTINAL - ADULT  Goal: Minimal or absence of nausea and/or vomiting  Description: INTERVENTIONS:  - Administer IV fluids if ordered to ensure adequate hydration  - Maintain NPO status until nausea and vomiting are resolved  - Nasogastric tube if ordered  - Administer ordered antiemetic medications as needed  - Provide nonpharmacologic comfort measures as appropriate  - Advance diet as tolerated, if ordered  - Consider nutrition services referral to assist patient with adequate nutrition and appropriate food choices  Outcome: Progressing  Goal: Maintains or returns to baseline bowel function  Description: INTERVENTIONS:  - Assess bowel function  - Encourage oral fluids to ensure adequate hydration  - Administer IV fluids if ordered to ensure adequate hydration  - Administer ordered medications as needed  - Encourage mobilization and activity  - Consider nutritional services referral to assist patient with adequate nutrition and appropriate food choices  Outcome: Progressing  Goal: Maintains adequate nutritional intake  Description: INTERVENTIONS:  - Monitor percentage of each meal consumed  - Identify factors contributing to decreased intake, treat as appropriate  - Assist with meals as needed  - Monitor I&O, weight, and lab values if indicated  - Obtain nutrition services referral as needed  Outcome: Progressing  Goal: Establish and maintain optimal ostomy function  Description: INTERVENTIONS:  - Assess bowel function  - Encourage oral fluids to ensure adequate hydration  - Administer IV  fluids if ordered to ensure adequate hydration   - Administer ordered medications as needed  - Encourage mobilization and activity  - Nutrition services referral to assist patient with appropriate food choices  - Assess stoma site  - Consider wound care consult   Outcome: Progressing  Goal: Oral mucous membranes remain intact  Description: INTERVENTIONS  - Assess oral mucosa and hygiene practices  - Implement preventative oral hygiene regimen  - Implement oral medicated treatments as ordered  - Initiate Nutrition services referral as needed  Outcome: Progressing     Problem: GENITOURINARY - ADULT  Goal: Maintains or returns to baseline urinary function  Description: INTERVENTIONS:  - Assess urinary function  - Encourage oral fluids to ensure adequate hydration if ordered  - Administer IV fluids as ordered to ensure adequate hydration  - Administer ordered medications as needed  - Offer frequent toileting  - Follow urinary retention protocol if ordered  Outcome: Progressing  Goal: Absence of urinary retention  Description: INTERVENTIONS:  - Assess patient’s ability to void and empty bladder  - Monitor I/O  - Bladder scan as needed  - Discuss with physician/AP medications to alleviate retention as needed  - Discuss catheterization for long term situations as appropriate  Outcome: Progressing  Goal: Urinary catheter remains patent  Description: INTERVENTIONS:  - Assess patency of urinary catheter  - If patient has a chronic schmitt, consider changing catheter if non-functioning  - Follow guidelines for intermittent irrigation of non-functioning urinary catheter  Outcome: Progressing     Problem: METABOLIC, FLUID AND ELECTROLYTES - ADULT  Goal: Electrolytes maintained within normal limits  Description: INTERVENTIONS:  - Monitor labs and assess patient for signs and symptoms of electrolyte imbalances  - Administer electrolyte replacement as ordered  - Monitor response to electrolyte replacements, including repeat lab  results as appropriate  - Instruct patient on fluid and nutrition as appropriate  Outcome: Progressing  Goal: Fluid balance maintained  Description: INTERVENTIONS:  - Monitor labs   - Monitor I/O and WT  - Instruct patient on fluid and nutrition as appropriate  - Assess for signs & symptoms of volume excess or deficit  Outcome: Progressing  Goal: Glucose maintained within target range  Description: INTERVENTIONS:  - Monitor Blood Glucose as ordered  - Assess for signs and symptoms of hyperglycemia and hypoglycemia  - Administer ordered medications to maintain glucose within target range  - Assess nutritional intake and initiate nutrition service referral as needed  Outcome: Progressing     Problem: SKIN/TISSUE INTEGRITY - ADULT  Goal: Skin Integrity remains intact(Skin Breakdown Prevention)  Description: Assess:  -Perform Sae assessment every shift   -Clean and moisturize skin every 2 hours   -Inspect skin when repositioning, toileting, and assisting with ADLS  -Assess extremities for adequate circulation and sensation     Bed Management:  -Have minimal linens on bed & keep smooth, unwrinkled  -Change linens as needed when moist or perspiring  -Avoid sitting or lying in one position for more than 2 hours while in bed  -Keep HOB at 30 degrees     Toileting:  -Offer bedside commode  -Assess for incontinence every 2 hours      Activity:  -Mobilize patient 3 times a day  -Encourage activity and walks on unit  -Encourage or provide ROM exercises   -Turn and reposition patient every 2 Hours  -Use appropriate equipment to lift or move patient in bed  -Instruct/ Assist with weight shifting every 30 minutes when out of bed in chair  -Consider limitation of chair time 2 hour intervals    Skin Care:  -Avoid use of baby powder, tape, friction and shearing, hot water or constrictive clothing  -Relieve pressure over bony prominences using cushions   -Do not massage red bony areas    Next Steps:  -Teach patient strategies to  minimize risks such as q2 turns    -Consider consults to  interdisciplinary teams such as wound nurse   Outcome: Progressing  Goal: Incision(s), wounds(s) or drain site(s) healing without S/S of infection  Description: INTERVENTIONS  - Assess and document dressing, incision, wound bed, drain sites and surrounding tissue  - Provide patient and family education  - Perform skin care/dressing changes per order   Outcome: Progressing  Goal: Pressure injury heals and does not worsen  Description: Interventions:  - Implement low air loss mattress or specialty surface (Criteria met)  - Apply silicone foam dressing  - Instruct/assist with weight shifting every 30  minutes when in chair   - Limit chair time to 2 hour intervals  - Use special pressure reducing interventions such as cushions when in chair   - Apply fecal or urinary incontinence containment device   - Perform passive or active ROM every shift   - Turn and reposition patient & offload bony prominences every 2 hours   - Utilize friction reducing device or surface for transfers   - Consider consults to  interdisciplinary teams such as wound nurse   - Consider nutrition services referral as needed  Outcome: Progressing

## 2025-06-20 NOTE — PHYSICAL THERAPY NOTE
Physical Therapy Cancellation Note       06/20/25 0745   PT Last Visit   PT Visit Date 06/20/25   Note Type   Note type Cancelled Session   Cancel Reasons Patient to operating room     Leeanna Henao

## 2025-06-20 NOTE — PROGRESS NOTES
Gold Van is a 79 y.o. male who is currently ordered Vancomycin IV with management by the Pharmacy Consult service.  Relevant clinical data and objective / subjective history reviewed.  Vancomycin Assessment:  Indication and Goal AUC/Trough: Bone/joint infection (goal -600, trough >10)  Clinical Status: stable  Micro:     Renal Function:  SCr: 0.88 mg/dL  CrCl: 63.2 mL/min  Renal replacement: Not on dialysis  Days of Therapy: 4  Current Dose: 1250mg IV q24h  Vancomycin Plan:  New Dosinmg IV q24h  Estimated AUC: 509 mcg*hr/mL  Estimated Trough: 11.5 mcg/mL  Next Level: 6/23 AM Labs  Renal Function Monitoring: Daily BMP and UOP  Pharmacy will continue to follow closely for s/sx of nephrotoxicity, infusion reactions and appropriateness of therapy.  BMP and CBC will be ordered per protocol. We will continue to follow the patient’s culture results and clinical progress daily.    Maged Davila, Pharmacist

## 2025-06-20 NOTE — PROGRESS NOTES
Progress Note - Hospitalist   Name: Gold Van 79 y.o. male I MRN: [id number]  Unit/Bed#: ICU 02-01 I Date of Admission: 6/17/2025   Date of Service: 6/20/2025 I Hospital Day: 3    Assessment & Plan  Septic shock ([facility])  SHOCK STATE RESOLVED  Source: RLE osteo  Hx serratia/pseudomonas/e faecalis wound infection in past  MRI R foot-Postsurgical changes of partial fourth and fifth ray resections with a soft tissue ulcer overlying the cut surfaces of the fourth and fifth metatarsals and findings concerning for early osteomyelitis in the residual fourth metatarsal. No definite MRI evidence of osteomyelitis. The ulcer is also in close approximation to the distal third metatarsal, and early osteomyelitis in that location cannot be excluded. No evidence of an abscess  Zosyn/Vanco D4  OR planned for today with Podiatry  Acute renal failure superimposed on stage 2 chronic kidney disease  ([facility])  Lab Results   Component Value Date    EGFR 81 06/20/2025    EGFR 67 06/19/2025    EGFR 58 06/18/2025    CREATININE 0.88 06/20/2025    CREATININE 1.04 06/19/2025    CREATININE 1.17 06/18/2025     POA with creatinine 1.49  Creatinine is now around baseline of 0.9-1.0  Likely prerenal in setting of shock state  Daily metabolic panel and trend creatinine  Type 2 diabetes mellitus with complication, without long-term current use of insulin ([facility])  Lab Results   Component Value Date    HGBA1C 9.3 (H) 04/09/2025       Recent Labs     06/20/25  0209 06/20/25  0415 06/20/25  0608 06/20/25  0802   POCGLU 92 121 119 126       Blood Sugar Average: Last 72 hrs:  (P) [id number]    Continue insulin infusion for now   Home glipizide and metformin on hold   Essential hypertension  Holding home atenolol/lisinopril in post shock state  CVA (cerebrovascular accident) ([facility])  No residual deficits  Home statin  Holding clopidogrel for OR today   PAF (paroxysmal atrial fibrillation) ([facility])  Home atenolol on hold due to shock state, resume when  appropriate   Home apixaban on hold, heparin infusion pending OR today  Mixed hyperlipidemia  Continue home statin  Hyponatremia  Chronically 130-134  Stable  Metabolic panel and trend sodium  Pruritic condition  Home prednisone  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HCC)  Continue home inhaler  Gastroesophageal reflux disease without esophagitis  Continue home PPI  Severe peripheral arterial disease (HCC)  Hx R SFA stent 5/13  Home clopidogrel on hold due to the OR today  Anemia  Hemoglobin 7.8   Iron panel reviewed, hold off on IV iron due to infection  Deceived 1 UPRBC 6/18  Daily CBC and trend hemoglobin  Pressure injury of skin of sacral region  Wound care  Metabolic acidosis  Stable 20  LA cleared  Bmp in am  Ambulatory dysfunction  RLE NWB  Moderate protein-calorie malnutrition (HCC)  Malnutrition Findings:        Nutrition following                       BMI Findings:           Body mass index is 19.85 kg/m².       VTE Pharmacologic Prophylaxis: VTE Score: 3 Moderate Risk (Score 3-4) - Pharmacological DVT Prophylaxis Ordered: heparin drip.    Mobility:   Basic Mobility Inpatient Raw Score: 9  JH-M Goal: 3: Sit at edge of bed  JH-HLM Achieved: 4: Move to chair/commode  JH-HLM Goal achieved. Continue to encourage appropriate mobility.    Patient Centered Rounds: I performed bedside rounds with nursing staff today.   Discussions with Specialists or Other Care Team Provider: Case management    Education and Discussions with Family / Patient: Updated  (wife, son, and daughter) at bedside.    Current Length of Stay: 3 day(s)  Current Patient Status: Inpatient   Certification Statement: The patient will continue to require additional inpatient hospital stay due to right lower extremity wound, OM, plan for OR today BKA.  Discharge Plan: Anticipate discharge in 48 hrs to rehab facility.    Code Status: Level 1 - Full Code    Subjective   Evaluated at bedside.  No new physical complaints offered.  No overnight events.   Awaiting OR today.    Objective :  Temp:  [97.6 °F (36.4 °C)-98.4 °F (36.9 °C)] 98.2 °F (36.8 °C)  HR:  [75-96] 75  BP: (126-153)/(60-84) 140/79  Resp:  [18-39] 18  SpO2:  [92 %-99 %] 98 %  O2 Device: None (Room air)    Body mass index is 19.85 kg/m².     Input and Output Summary (last 24 hours):     Intake/Output Summary (Last 24 hours) at 6/20/2025 0831  Last data filed at 6/20/2025 0804  Gross per 24 hour   Intake 1210.26 ml   Output 2325 ml   Net -1114.74 ml       Physical Exam  Vitals and nursing note reviewed.   HENT:      Head: Normocephalic and atraumatic.      Nose: Nose normal.      Mouth/Throat:      Mouth: Mucous membranes are moist.      Pharynx: Oropharynx is clear.     Eyes:      Pupils: Pupils are equal, round, and reactive to light.       Cardiovascular:      Rate and Rhythm: Normal rate and regular rhythm.      Pulses: Normal pulses.   Pulmonary:      Effort: Pulmonary effort is normal. No respiratory distress.      Breath sounds: Normal breath sounds.   Abdominal:      General: Bowel sounds are normal.      Palpations: Abdomen is soft.      Tenderness: There is no abdominal tenderness.     Musculoskeletal:      Cervical back: Neck supple.      Right lower leg: No edema.      Left lower leg: No edema.     Skin:     General: Skin is warm and dry.      Capillary Refill: Capillary refill takes less than 2 seconds.      Comments: Right lower extremity dressing clean dry and intact.     Neurological:      General: No focal deficit present.      Mental Status: He is alert and oriented to person, place, and time.           Lines/Drains:  Lines/Drains/Airways       Active Status       Name Placement date Placement time Site Days    CVC Central Lines 06/17/25 Triple Right 06/17/25  1500  --  2                    Central Line:  Goal for removal: Will discontinue when meds requiring line are completed.               Lab Results: I have reviewed the following results:   Results from last 7 days   Lab Units  06/20/25  0521 06/19/25  0401 06/18/25  0431 06/17/25  1112   WBC Thousand/uL 8.59 10.14   < > 17.93*   HEMOGLOBIN g/dL 7.8* 7.1*   < > 8.8*   HEMATOCRIT % 25.0* 22.5*   < > 28.4*   PLATELETS Thousands/uL 263 232   < > 361   BANDS PCT %  --   --   --  23*   SEGS PCT %  --  88*  --   --    LYMPHO PCT %  --  7*  --  0*   MONO PCT %  --  4  --  1*   EOS PCT %  --  0  --  0    < > = values in this interval not displayed.     Results from last 7 days   Lab Units 06/20/25  0521 06/18/25  1154 06/18/25  0431   SODIUM mmol/L 137   < > 134*   POTASSIUM mmol/L 3.9   < > 3.3*   CHLORIDE mmol/L 107   < > 105   CO2 mmol/L 23   < > 20*   BUN mg/dL 14   < > 28*   CREATININE mg/dL 0.88   < > 1.39*   ANION GAP mmol/L 7   < > 9   CALCIUM mg/dL 8.2*   < > 8.4   ALBUMIN g/dL  --   --  2.9*   TOTAL BILIRUBIN mg/dL  --   --  0.27   ALK PHOS U/L  --   --  43   ALT U/L  --   --  10   AST U/L  --   --  16   GLUCOSE RANDOM mg/dL 118   < > 143*    < > = values in this interval not displayed.     Results from last 7 days   Lab Units 06/18/25  0431   INR  2.68*     Results from last 7 days   Lab Units 06/20/25  0802 06/20/25  0608 06/20/25  0415 06/20/25  0209 06/20/25  0017 06/19/25  2202 06/19/25  2023 06/19/25  1813 06/19/25  1557 06/19/25  1355 06/19/25  1232 06/19/25  1057   POC GLUCOSE mg/dl 126 119 121 92 116 127 192* 193* 137 198* 234* 246*         Results from last 7 days   Lab Units 06/19/25  0351 06/18/25  0431 06/18/25  0008 06/17/25  2135 06/17/25  1347 06/17/25  1112   LACTIC ACID mmol/L  --   --  1.6 2.1* 3.7* 3.7*   PROCALCITONIN ng/ml 18.42* 27.46*  --   --   --  14.91*       Recent Cultures (last 7 days):   Results from last 7 days   Lab Units 06/17/25  1112   BLOOD CULTURE  No Growth at 48 hrs.  Staphylococcus aureus*   GRAM STAIN RESULT  Gram positive cocci in clusters*             Last 24 Hours Medication List:     Current Facility-Administered Medications:     acetaminophen (TYLENOL) tablet 650 mg, Q6H PRN    albuterol  (PROVENTIL HFA,VENTOLIN HFA) inhaler 2 puff, Q4H PRN    albuterol inhalation solution 2.5 mg, Q6H PRN    atorvastatin (LIPITOR) tablet 40 mg, QPM    Budeson-Glycopyrrol-Formoterol 160-9-4.8 MCG/ACT AERO 2 puff, Q12H SANDRA    famotidine (PEPCID) tablet 20 mg, Daily    heparin (porcine) 25,000 units in 0.45% NaCl 250 mL infusion (premix), Titrated, Last Rate: 27 Units/kg/hr (06/20/25 0605)    insulin regular (HumuLIN R,NovoLIN R) 1 Units/mL in sodium chloride 0.9 % 100 mL infusion, Titrated, Last Rate: 1.5 Units/hr (06/20/25 0807)    montelukast (SINGULAIR) tablet 10 mg, HS    multivitamin-minerals (CENTRUM) tablet 1 tablet, Daily    ondansetron (ZOFRAN) injection 4 mg, Q6H PRN    [COMPLETED] piperacillin-tazobactam (ZOSYN) 4.5 g in sodium chloride 0.9 % 100 mL IV LOADING DOSE, Once, Last Rate: 4.5 g (06/17/25 1600) **FOLLOWED BY** piperacillin-tazobactam (ZOSYN) 4.5 g in sodium chloride 0.9 % 100 mL IVPB (EXTENDED INFUSION), Q8H, Last Rate: 4.5 g (06/20/25 0205)    polyethylene glycol (MIRALAX) packet 17 g, Daily PRN    predniSONE tablet 5 mg, Daily    senna-docusate sodium (SENOKOT S) 8.6-50 mg per tablet 2 tablet, HS    vancomycin (VANCOCIN) 1,250 mg in sodium chloride 0.9 % 250 mL IVPB, Q24H, Last Rate: 1,250 mg (06/19/25 1131)    Administrative Statements   Today, Patient Was Seen By: BELGICA Thompson      **Please Note: This note may have been constructed using a voice recognition system.**

## 2025-06-20 NOTE — ASSESSMENT & PLAN NOTE
Hemoglobin 7.8   Iron panel reviewed, hold off on IV iron due to infection  Deceived 1 UPRBC 6/18  Daily CBC and trend hemoglobin

## 2025-06-20 NOTE — ANESTHESIA PREPROCEDURE EVALUATION
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 ([facility])   (+) Essential hypertension   (+) Mixed hyperlipidemia   (+) PAF (paroxysmal atrial fibrillation) (HCC)   (+) Pulmonary hypertension (HCC)   (+) Severe peripheral arterial disease (HCC)      ENDO   (+) Type 2 diabetes mellitus with complication, without long-term current use of insulin (HCC)      GI/HEPATIC   (+) Gastroesophageal reflux disease without esophagitis      /RENAL   (+) Acute renal failure superimposed on stage 2 chronic kidney disease  (HCC)      HEMATOLOGY   (+) Anemia      NEURO/PSYCH   (+) CVA (cerebrovascular accident) (HCC)      PULMONARY   (+) 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[facility])   (+) Mild intermittent asthma without complication      Other   (+) 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 -- WHO PRESENTED [facility]-Ca transferred from wound clinic for RLE osteomyelitis with soft BP, increased lethargy, Nausea, dizziness.\"      Case cancelled because heparin infusion was not held    Physical Exam    Airway     Mallampati score: II  TM Distance: >3 FB  Neck ROM: full  Mouth opening: >= 4 cm      Cardiovascular      Dental   Comment: Edentulous upper     Pulmonary      Neurological    He appears awake, alert and oriented x3.      Other Findings        1/9/25 Regadenoson Stress: Normal left ventricular systolic function. No evidence for ischemia.    10/1/24 TTE: Low normal left ventricular systolic function. Grade I diastolic dysfunction. Normal right ventricular systolic function. Mild " AI. Right ventricular systolic pressure 37 mmHg.    Anesthesia Plan  ASA Score- 3     Anesthesia Type- IV sedation with anesthesia with ASA Monitors.         Additional Monitors:     Airway Plan: natural airway.    Comment: I discussed the risks and benefits of IV sedation anesthesia including the possibility of the need to convert to general anesthesia and the potential risk of awareness.       Plan Factors-    Chart reviewed.   Existing labs reviewed.                   Induction- intravenous.    Postoperative Plan- Plan for postoperative opioid use.   Monitoring Plan - Monitoring plan - standard ASA monitoring  Post Operative Pain Plan - plan for postoperative opioid use    Perioperative Resuscitation Plan - Level 1 - Full Code.       Informed Consent- Anesthetic plan and risks discussed with patient.        NPO Status:  Vitals Value Taken Time   Date of last liquid 06/19/25 06/20/25 13:10   Time of last liquid 2200 06/20/25 13:10   Date of last solid 06/19/25 06/20/25 13:10   Time of last solid 1800 06/20/25 13:10

## 2025-06-20 NOTE — ASSESSMENT & PLAN NOTE
Lab Results   Component Value Date    EGFR 81 06/20/2025    EGFR 67 06/19/2025    EGFR 58 06/18/2025    CREATININE 0.88 06/20/2025    CREATININE 1.04 06/19/2025    CREATININE 1.17 06/18/2025     POA with creatinine 1.49  Creatinine is now around baseline of 0.9-1.0  Likely prerenal in setting of shock state  Daily metabolic panel and trend creatinine

## 2025-06-20 NOTE — PLAN OF CARE
Problem: Prexisting or High Potential for Compromised Skin Integrity  Goal: Skin integrity is maintained or improved  Description: INTERVENTIONS:  - Identify patients at risk for skin breakdown  - Assess and monitor skin integrity including under and around medical devices   - Assess and monitor nutrition and hydration status  - Monitor labs  - Assess for incontinence   - Turn and reposition patient  - Assist with mobility/ambulation  - Relieve pressure over joycelyn prominences   - Avoid friction and shearing  - Provide appropriate hygiene as needed including keeping skin clean and dry  - Evaluate need for skin moisturizer/barrier cream  - Collaborate with interdisciplinary team  - Patient/family teaching  - Consider wound care consult    Assess:  - Review Sae scale daily  - Clean and moisturize skin every shift  - Inspect skin when repositioning, toileting, and assisting with ADLS  - Assess under medical devices   - Assess extremities for adequate circulation and sensation     Bed Management:  - Have minimal linens on bed & keep smooth, unwrinkled  - Change linens as needed when moist or perspiring  - Avoid sitting or lying in one position for more than 2 hours while in bed?Keep HOB at 30 degrees   - Toileting:  - Offer bedside commode  - Assess for incontinence   - Use incontinent care products after each incontinent episode     Activity:  - Mobilize patient 3 times a day  - Encourage activity and walks on unit  - Encourage or provide ROM exercises   - Turn and reposition patient every 2 Hours  - Use appropriate equipment to lift or move patient in bed  - Instruct/ Assist with weight shifting every 60 when out of bed in chair  - Consider limitation of chair time 2 hour intervals    Skin Care:  - Avoid use of baby powder, tape, friction and shearing, hot water or constrictive clothing  - Relieve pressure over bony prominences using mepelex  - Do not massage red bony areas    Next Steps:  - Teach patient  strategies to minimize risks  - Consider consults to  interdisciplinary teams   Outcome: Progressing     Problem: PAIN - ADULT  Goal: Verbalizes/displays adequate comfort level or baseline comfort level  Description: Interventions:  - Encourage patient to monitor pain and request assistance  - Assess pain using appropriate pain scale  - Administer analgesics as ordered based on type and severity of pain and evaluate response  - Implement non-pharmacological measures as appropriate and evaluate response  - Consider cultural and social influences on pain and pain management  - Notify physician/advanced practitioner if interventions unsuccessful or patient reports new pain  - Educate patient/family on pain management process including their role and importance of  reporting pain   - Provide non-pharmacologic/complimentary pain relief interventions  Outcome: Progressing     Problem: INFECTION - ADULT  Goal: Absence or prevention of progression during hospitalization  Description: INTERVENTIONS:  - Assess and monitor for signs and symptoms of infection  - Monitor lab/diagnostic results  - Monitor all insertion sites, i.e. indwelling lines, tubes, and drains  - Monitor endotracheal if appropriate and nasal secretions for changes in amount and color  - [place] appropriate cooling/warming therapies per order  - Administer medications as ordered  - Instruct and encourage patient and family to use good hand hygiene technique  - Identify and instruct in appropriate isolation precautions for identified infection/condition  Outcome: Progressing  Goal: Absence of fever/infection during neutropenic period  Description: INTERVENTIONS:  - Monitor WBC  - Perform strict hand hygiene  - Limit to healthy visitors only  - No plants, dried, fresh or silk flowers with otoole in patient room  Outcome: Progressing     Problem: SAFETY ADULT  Goal: Patient will remain free of falls  Description: INTERVENTIONS:  - Educate patient/family on  patient safety including physical limitations  - Instruct patient to call for assistance with activity   - Consider consulting OT/PT to assist with strengthening/mobility based on AM PAC & JH-HLM score  - Consult OT/PT to assist with strengthening/mobility   - Keep Call bell within reach  - Keep bed low and locked with side rails adjusted as appropriate  - Keep care items and personal belongings within reach  - Initiate and maintain comfort rounds  - Make Fall Risk Sign visible to staff  - Offer Toileting every 1 Hours, in advance of need  - Initiate/Maintain bed alarm  - Obtain necessary fall risk management equipment:   - Apply yellow socks and bracelet for high fall risk patients  - Consider moving patient to room near nurses station  Outcome: Progressing  Goal: Maintain or return to baseline ADL function  Description: INTERVENTIONS:  -  Assess patient's ability to carry out ADLs; assess patient's baseline for ADL function and identify physical deficits which impact ability to perform ADLs (bathing, care of mouth/teeth, toileting, grooming, dressing, etc.)  - Assess/evaluate cause of self-care deficits   - Assess range of motion  - Assess patient's mobility; develop plan if impaired  - Assess patient's need for assistive devices and provide as appropriate  - Encourage maximum independence but intervene and supervise when necessary  - Involve family in performance of ADLs  - Assess for home care needs following discharge   - Consider OT consult to assist with ADL evaluation and planning for discharge  - Provide patient education as appropriate  - Monitor functional capacity and physical performance, use of AM PAC & JH-HLM   - Monitor gait, balance and fatigue with ambulation    Outcome: Progressing     Problem: DISCHARGE PLANNING  Goal: Discharge to home or other facility with appropriate resources  Description: INTERVENTIONS:  - Identify barriers to discharge w/patient and caregiver  - Arrange for needed  discharge resources and transportation as appropriate  - Identify discharge learning needs (meds, wound care, etc.)  - Arrange for interpretive services to assist at discharge as needed  - Refer to Case Management Department for coordinating discharge planning if the patient needs post-hospital services based on physician/advanced practitioner order or complex needs related to functional status, cognitive ability, or social support system  Outcome: Progressing     Problem: Knowledge Deficit  Goal: Patient/family/caregiver demonstrates understanding of disease process, treatment plan, medications, and discharge instructions  Description: Complete learning assessment and assess knowledge base.  Interventions:  - Provide teaching at level of understanding  - Provide teaching via preferred learning methods  Outcome: Progressing     Problem: Nutrition/Hydration-ADULT  Goal: Nutrient/Hydration intake appropriate for improving, restoring or maintaining nutritional needs  Description: Monitor and assess patient's nutrition/hydration status for malnutrition. Collaborate with interdisciplinary team and initiate plan and interventions as ordered.  Monitor patient's weight and dietary intake as ordered or per policy. Utilize nutrition screening tool and intervene as necessary. Determine patient's food preferences and provide high-protein, high-caloric foods as appropriate.     INTERVENTIONS:  - Monitor oral intake, urinary output, labs, and treatment plans  - Assess nutrition and hydration status and recommend course of action  - Evaluate amount of meals eaten  - Assist patient with eating if necessary   - Allow adequate time for meals  - Recommend/ encourage appropriate diets, oral nutritional supplements, and vitamin/mineral supplements  - Order, calculate, and assess calorie counts as needed  - Recommend, monitor, and adjust tube feedings and TPN/PPN based on assessed needs  - Assess need for intravenous fluids  - Provide  specific nutrition/hydration education as appropriate  - Include patient/family/caregiver in decisions related to nutrition  Outcome: Progressing     Problem: GASTROINTESTINAL - ADULT  Goal: Minimal or absence of nausea and/or vomiting  Description: INTERVENTIONS:  - Administer IV fluids if ordered to ensure adequate hydration  - Maintain NPO status until nausea and vomiting are resolved  - Nasogastric tube if ordered  - Administer ordered antiemetic medications as needed  - Provide nonpharmacologic comfort measures as appropriate  - Advance diet as tolerated, if ordered  - Consider nutrition services referral to assist patient with adequate nutrition and appropriate food choices  Outcome: Progressing  Goal: Maintains or returns to baseline bowel function  Description: INTERVENTIONS:  - Assess bowel function  - Encourage oral fluids to ensure adequate hydration  - Administer IV fluids if ordered to ensure adequate hydration  - Administer ordered medications as needed  - Encourage mobilization and activity  - Consider nutritional services referral to assist patient with adequate nutrition and appropriate food choices  Outcome: Progressing  Goal: Maintains adequate nutritional intake  Description: INTERVENTIONS:  - Monitor percentage of each meal consumed  - Identify factors contributing to decreased intake, treat as appropriate  - Assist with meals as needed  - Monitor I&O, weight, and lab values if indicated  - Obtain nutrition services referral as needed  Outcome: Progressing  Goal: Establish and maintain optimal ostomy function  Description: INTERVENTIONS:  - Assess bowel function  - Encourage oral fluids to ensure adequate hydration  - Administer IV fluids if ordered to ensure adequate hydration   - Administer ordered medications as needed  - Encourage mobilization and activity  - Nutrition services referral to assist patient with appropriate food choices  - Assess stoma site  - Consider wound care consult    Outcome: Progressing  Goal: Oral mucous membranes remain intact  Description: INTERVENTIONS  - Assess oral mucosa and hygiene practices  - Implement preventative oral hygiene regimen  - Implement oral medicated treatments as ordered  - Initiate Nutrition services referral as needed  Outcome: Progressing

## 2025-06-20 NOTE — ASSESSMENT & PLAN NOTE
Home atenolol on hold due to shock state, resume when appropriate   Home apixaban on hold, heparin infusion pending OR today

## 2025-06-21 LAB
ABO GROUP BLD: NORMAL
ANION GAP SERPL CALCULATED.3IONS-SCNC: 7 MMOL/L (ref 4–13)
APTT PPP: 33 SECONDS (ref 23–34)
APTT PPP: 46 SECONDS (ref 23–34)
APTT PPP: 58 SECONDS (ref 23–34)
APTT PPP: 70 SECONDS (ref 23–34)
ATRIAL RATE: 117 BPM
BASOPHILS # BLD AUTO: 0.01 THOUSANDS/ÂΜL (ref 0–0.1)
BASOPHILS NFR BLD AUTO: 0 % (ref 0–1)
BLD GP AB SCN SERPL QL: NEGATIVE
BUN SERPL-MCNC: 9 MG/DL (ref 5–25)
CALCIUM SERPL-MCNC: 8.3 MG/DL (ref 8.4–10.2)
CHLORIDE SERPL-SCNC: 104 MMOL/L (ref 96–108)
CO2 SERPL-SCNC: 25 MMOL/L (ref 21–32)
CREAT SERPL-MCNC: 0.81 MG/DL (ref 0.6–1.3)
EOSINOPHIL # BLD AUTO: 0.17 THOUSAND/ÂΜL (ref 0–0.61)
EOSINOPHIL NFR BLD AUTO: 2 % (ref 0–6)
ERYTHROCYTE [DISTWIDTH] IN BLOOD BY AUTOMATED COUNT: 15 % (ref 11.6–15.1)
GFR SERPL CREATININE-BSD FRML MDRD: 84 ML/MIN/1.73SQ M
GLUCOSE SERPL-MCNC: 110 MG/DL (ref 65–140)
GLUCOSE SERPL-MCNC: 113 MG/DL (ref 65–140)
GLUCOSE SERPL-MCNC: 127 MG/DL (ref 65–140)
GLUCOSE SERPL-MCNC: 127 MG/DL (ref 65–140)
GLUCOSE SERPL-MCNC: 128 MG/DL (ref 65–140)
GLUCOSE SERPL-MCNC: 132 MG/DL (ref 65–140)
GLUCOSE SERPL-MCNC: 146 MG/DL (ref 65–140)
GLUCOSE SERPL-MCNC: 149 MG/DL (ref 65–140)
GLUCOSE SERPL-MCNC: 198 MG/DL (ref 65–140)
GLUCOSE SERPL-MCNC: 210 MG/DL (ref 65–140)
GLUCOSE SERPL-MCNC: 232 MG/DL (ref 65–140)
GLUCOSE SERPL-MCNC: 279 MG/DL (ref 65–140)
GLUCOSE SERPL-MCNC: 318 MG/DL (ref 65–140)
HCT VFR BLD AUTO: 25.5 % (ref 36.5–49.3)
HGB BLD-MCNC: 7.9 G/DL (ref 12–17)
IMM GRANULOCYTES # BLD AUTO: 0.03 THOUSAND/UL (ref 0–0.2)
IMM GRANULOCYTES NFR BLD AUTO: 0 % (ref 0–2)
LYMPHOCYTES # BLD AUTO: 1.22 THOUSANDS/ÂΜL (ref 0.6–4.47)
LYMPHOCYTES NFR BLD AUTO: 17 % (ref 14–44)
MCH RBC QN AUTO: 27.3 PG (ref 26.8–34.3)
MCHC RBC AUTO-ENTMCNC: 31 G/DL (ref 31.4–37.4)
MCV RBC AUTO: 88 FL (ref 82–98)
MONOCYTES # BLD AUTO: 0.5 THOUSAND/ÂΜL (ref 0.17–1.22)
MONOCYTES NFR BLD AUTO: 7 % (ref 4–12)
NEUTROPHILS # BLD AUTO: 5.16 THOUSANDS/ÂΜL (ref 1.85–7.62)
NEUTS SEG NFR BLD AUTO: 74 % (ref 43–75)
NRBC BLD AUTO-RTO: 0 /100 WBCS
PLATELET # BLD AUTO: 267 THOUSANDS/UL (ref 149–390)
PMV BLD AUTO: 9.6 FL (ref 8.9–12.7)
POTASSIUM SERPL-SCNC: 3.4 MMOL/L (ref 3.5–5.3)
QRS AXIS: 92 DEGREES
QRSD INTERVAL: 82 MS
QT INTERVAL: 300 MS
QTC INTERVAL: 492 MS
RBC # BLD AUTO: 2.89 MILLION/UL (ref 3.88–5.62)
RH BLD: POSITIVE
SODIUM SERPL-SCNC: 136 MMOL/L (ref 135–147)
SPECIMEN EXPIRATION DATE: NORMAL
T WAVE AXIS: 267 DEGREES
VENTRICULAR RATE: 162 BPM
WBC # BLD AUTO: 7.09 THOUSAND/UL (ref 4.31–10.16)

## 2025-06-21 PROCEDURE — 85025 COMPLETE CBC W/AUTO DIFF WBC: CPT | Performed by: PHYSICIAN ASSISTANT

## 2025-06-21 PROCEDURE — 94664 DEMO&/EVAL PT USE INHALER: CPT

## 2025-06-21 PROCEDURE — 85730 THROMBOPLASTIN TIME PARTIAL: CPT | Performed by: INTERNAL MEDICINE

## 2025-06-21 PROCEDURE — 86900 BLOOD TYPING SEROLOGIC ABO: CPT | Performed by: NURSE PRACTITIONER

## 2025-06-21 PROCEDURE — 93005 ELECTROCARDIOGRAM TRACING: CPT

## 2025-06-21 PROCEDURE — 85730 THROMBOPLASTIN TIME PARTIAL: CPT | Performed by: PHYSICIAN ASSISTANT

## 2025-06-21 PROCEDURE — 99232 SBSQ HOSP IP/OBS MODERATE 35: CPT | Performed by: NURSE PRACTITIONER

## 2025-06-21 PROCEDURE — 93010 ELECTROCARDIOGRAM REPORT: CPT | Performed by: INTERNAL MEDICINE

## 2025-06-21 PROCEDURE — 80048 BASIC METABOLIC PNL TOTAL CA: CPT | Performed by: PHYSICIAN ASSISTANT

## 2025-06-21 PROCEDURE — 86921 COMPATIBILITY TEST INCUBATE: CPT

## 2025-06-21 PROCEDURE — 86850 RBC ANTIBODY SCREEN: CPT | Performed by: NURSE PRACTITIONER

## 2025-06-21 PROCEDURE — 86922 COMPATIBILITY TEST ANTIGLOB: CPT

## 2025-06-21 PROCEDURE — 82948 REAGENT STRIP/BLOOD GLUCOSE: CPT

## 2025-06-21 PROCEDURE — 86901 BLOOD TYPING SEROLOGIC RH(D): CPT | Performed by: NURSE PRACTITIONER

## 2025-06-21 PROCEDURE — 94640 AIRWAY INHALATION TREATMENT: CPT

## 2025-06-21 PROCEDURE — 94760 N-INVAS EAR/PLS OXIMETRY 1: CPT

## 2025-06-21 RX ORDER — METOPROLOL TARTRATE 1 MG/ML
5 INJECTION, SOLUTION INTRAVENOUS ONCE
Status: COMPLETED | OUTPATIENT
Start: 2025-06-21 | End: 2025-06-21

## 2025-06-21 RX ORDER — DILTIAZEM HYDROCHLORIDE 5 MG/ML
15 INJECTION INTRAVENOUS ONCE
Status: DISCONTINUED | OUTPATIENT
Start: 2025-06-21 | End: 2025-07-05 | Stop reason: HOSPADM

## 2025-06-21 RX ORDER — METOPROLOL TARTRATE 1 MG/ML
INJECTION, SOLUTION INTRAVENOUS
Status: COMPLETED
Start: 2025-06-21 | End: 2025-06-21

## 2025-06-21 RX ORDER — DILTIAZEM HYDROCHLORIDE 5 MG/ML
20 INJECTION INTRAVENOUS ONCE
Status: COMPLETED | OUTPATIENT
Start: 2025-06-21 | End: 2025-06-21

## 2025-06-21 RX ADMIN — SODIUM CHLORIDE, SODIUM LACTATE, POTASSIUM CHLORIDE, AND CALCIUM CHLORIDE 125 ML/HR: .6; .31; .03; .02 INJECTION, SOLUTION INTRAVENOUS at 01:44

## 2025-06-21 RX ADMIN — METOPROLOL TARTRATE 5 MG: 1 INJECTION, SOLUTION INTRAVENOUS at 04:08

## 2025-06-21 RX ADMIN — HEPARIN SODIUM 20 UNITS/KG/HR: 10000 INJECTION, SOLUTION INTRAVENOUS at 10:13

## 2025-06-21 RX ADMIN — SODIUM CHLORIDE, SODIUM LACTATE, POTASSIUM CHLORIDE, AND CALCIUM CHLORIDE 125 ML/HR: .6; .31; .03; .02 INJECTION, SOLUTION INTRAVENOUS at 20:33

## 2025-06-21 RX ADMIN — BUDESONIDE, GLYCOPYRROLATE, AND FORMOTEROL FUMARATE 2 PUFF: 160; 9; 4.8 AEROSOL, METERED RESPIRATORY (INHALATION) at 21:56

## 2025-06-21 RX ADMIN — VANCOMYCIN HYDROCHLORIDE 1500 MG: 1 INJECTION, POWDER, LYOPHILIZED, FOR SOLUTION INTRAVENOUS at 10:13

## 2025-06-21 RX ADMIN — HEPARIN SODIUM 5200 UNITS: 1000 INJECTION, SOLUTION INTRAVENOUS; SUBCUTANEOUS at 10:47

## 2025-06-21 RX ADMIN — BUDESONIDE, GLYCOPYRROLATE, AND FORMOTEROL FUMARATE 2 PUFF: 160; 9; 4.8 AEROSOL, METERED RESPIRATORY (INHALATION) at 08:48

## 2025-06-21 RX ADMIN — Medication 4.5 G: at 01:09

## 2025-06-21 RX ADMIN — HEPARIN SODIUM 2600 UNITS: 1000 INJECTION, SOLUTION INTRAVENOUS; SUBCUTANEOUS at 03:41

## 2025-06-21 RX ADMIN — MONTELUKAST 10 MG: 10 TABLET, FILM COATED ORAL at 21:55

## 2025-06-21 RX ADMIN — PREDNISONE 5 MG: 5 TABLET ORAL at 08:48

## 2025-06-21 RX ADMIN — Medication 4.5 G: at 18:16

## 2025-06-21 RX ADMIN — FAMOTIDINE 20 MG: 20 TABLET, FILM COATED ORAL at 08:48

## 2025-06-21 RX ADMIN — METOROPROLOL TARTRATE 5 MG: 5 INJECTION, SOLUTION INTRAVENOUS at 04:08

## 2025-06-21 RX ADMIN — ATORVASTATIN CALCIUM 40 MG: 40 TABLET, FILM COATED ORAL at 18:16

## 2025-06-21 RX ADMIN — ACETAMINOPHEN 650 MG: 325 TABLET ORAL at 05:46

## 2025-06-21 RX ADMIN — HEPARIN SODIUM 2600 UNITS: 1000 INJECTION, SOLUTION INTRAVENOUS; SUBCUTANEOUS at 16:57

## 2025-06-21 RX ADMIN — Medication 4.5 G: at 10:13

## 2025-06-21 RX ADMIN — METOROPROLOL TARTRATE 5 MG: 5 INJECTION, SOLUTION INTRAVENOUS at 04:42

## 2025-06-21 RX ADMIN — DILTIAZEM HYDROCHLORIDE 20 MG: 5 INJECTION, SOLUTION INTRAVENOUS at 05:01

## 2025-06-21 RX ADMIN — ALBUTEROL SULFATE 2.5 MG: 2.5 SOLUTION RESPIRATORY (INHALATION) at 10:09

## 2025-06-21 RX ADMIN — SODIUM CHLORIDE, SODIUM LACTATE, POTASSIUM CHLORIDE, AND CALCIUM CHLORIDE 125 ML/HR: .6; .31; .03; .02 INJECTION, SOLUTION INTRAVENOUS at 10:12

## 2025-06-21 RX ADMIN — Medication 1 TABLET: at 08:48

## 2025-06-21 RX ADMIN — SODIUM CHLORIDE 8 UNITS/HR: 9 INJECTION, SOLUTION INTRAVENOUS at 18:16

## 2025-06-21 RX ADMIN — METOROPROLOL TARTRATE 5 MG: 5 INJECTION, SOLUTION INTRAVENOUS at 04:29

## 2025-06-21 NOTE — ASSESSMENT & PLAN NOTE
Lab Results   Component Value Date    EGFR 84 06/21/2025    EGFR 81 06/20/2025    EGFR 67 06/19/2025    CREATININE 0.81 06/21/2025    CREATININE 0.88 06/20/2025    CREATININE 1.04 06/19/2025     POA with creatinine 1.49  Creatinine is now around baseline of 0.9-1.0  Likely prerenal in setting of shock state  Daily metabolic panel and trend creatinine

## 2025-06-21 NOTE — ASSESSMENT & PLAN NOTE
Home atenolol on hold due to shock state, resume when appropriate   Home apixaban on hold, heparin infusion pending surgical intervention  Had episodes of atrial fibrillation with RVR this morning treated with 3 doses of metoprolol IV followed by Cardizem.  Heart rate during exam today improved to 80  Hold heparin infusion at least 4 hours prior to scheduled invention time on Monday

## 2025-06-21 NOTE — PLAN OF CARE
Problem: Prexisting or High Potential for Compromised Skin Integrity  Goal: Skin integrity is maintained or improved  Description: INTERVENTIONS:  - Identify patients at risk for skin breakdown  - Assess and monitor skin integrity including under and around medical devices   - Assess and monitor nutrition and hydration status  - Monitor labs  - Assess for incontinence   - Turn and reposition patient  - Assist with mobility/ambulation  - Relieve pressure over joycelyn prominences   - Avoid friction and shearing  - Provide appropriate hygiene as needed including keeping skin clean and dry  - Evaluate need for skin moisturizer/barrier cream  - Collaborate with interdisciplinary team  - Patient/family teaching  - Consider wound care consult    Assess:  - Review Sae scale daily  - Clean and moisturize skin every shift  - Inspect skin when repositioning, toileting, and assisting with ADLS  - Assess under medical devices   - Assess extremities for adequate circulation and sensation     Bed Management:  - Have minimal linens on bed & keep smooth, unwrinkled  - Change linens as needed when moist or perspiring  - Avoid sitting or lying in one position for more than 2 hours while in bed?Keep HOB at 30 degrees   - Toileting:  - Offer bedside commode  - Assess for incontinence   - Use incontinent care products after each incontinent episode     Activity:  - Mobilize patient 3 times a day  - Encourage activity and walks on unit  - Encourage or provide ROM exercises   - Turn and reposition patient every 2 Hours  - Use appropriate equipment to lift or move patient in bed  - Instruct/ Assist with weight shifting every 60 when out of bed in chair  - Consider limitation of chair time 2 hour intervals    Skin Care:  - Avoid use of baby powder, tape, friction and shearing, hot water or constrictive clothing  - Relieve pressure over bony prominences using mepelex  - Do not massage red bony areas    Next Steps:  - Teach patient  strategies to minimize risks  - Consider consults to  interdisciplinary teams   Outcome: Progressing     Problem: PAIN - ADULT  Goal: Verbalizes/displays adequate comfort level or baseline comfort level  Description: Interventions:  - Encourage patient to monitor pain and request assistance  - Assess pain using appropriate pain scale  - Administer analgesics as ordered based on type and severity of pain and evaluate response  - Implement non-pharmacological measures as appropriate and evaluate response  - Consider cultural and social influences on pain and pain management  - Notify physician/advanced practitioner if interventions unsuccessful or patient reports new pain  - Educate patient/family on pain management process including their role and importance of  reporting pain   - Provide non-pharmacologic/complimentary pain relief interventions  Outcome: Progressing     Problem: INFECTION - ADULT  Goal: Absence or prevention of progression during hospitalization  Description: INTERVENTIONS:  - Assess and monitor for signs and symptoms of infection  - Monitor lab/diagnostic results  - Monitor all insertion sites, i.e. indwelling lines, tubes, and drains  - Monitor endotracheal if appropriate and nasal secretions for changes in amount and color  - [place] appropriate cooling/warming therapies per order  - Administer medications as ordered  - Instruct and encourage patient and family to use good hand hygiene technique  - Identify and instruct in appropriate isolation precautions for identified infection/condition  Outcome: Progressing  Goal: Absence of fever/infection during neutropenic period  Description: INTERVENTIONS:  - Monitor WBC  - Perform strict hand hygiene  - Limit to healthy visitors only  - No plants, dried, fresh or silk flowers with otoole in patient room  Outcome: Progressing     Problem: SAFETY ADULT  Goal: Patient will remain free of falls  Description: INTERVENTIONS:  - Educate patient/family on  patient safety including physical limitations  - Instruct patient to call for assistance with activity   - Consider consulting OT/PT to assist with strengthening/mobility based on AM PAC & JH-HLM score  - Consult OT/PT to assist with strengthening/mobility   - Keep Call bell within reach  - Keep bed low and locked with side rails adjusted as appropriate  - Keep care items and personal belongings within reach  - Initiate and maintain comfort rounds  - Make Fall Risk Sign visible to staff  - Offer Toileting every 2 Hours, in advance of need  - Initiate/Maintain bed alarm  - Obtain necessary fall risk management equipment: yellow socks and bracelet  - Apply yellow socks and bracelet for high fall risk patients  - Consider moving patient to room near nurses station  Outcome: Progressing  Goal: Maintain or return to baseline ADL function  Description: INTERVENTIONS:  -  Assess patient's ability to carry out ADLs; assess patient's baseline for ADL function and identify physical deficits which impact ability to perform ADLs (bathing, care of mouth/teeth, toileting, grooming, dressing, etc.)  - Assess/evaluate cause of self-care deficits   - Assess range of motion  - Assess patient's mobility; develop plan if impaired  - Assess patient's need for assistive devices and provide as appropriate  - Encourage maximum independence but intervene and supervise when necessary  - Involve family in performance of ADLs  - Assess for home care needs following discharge   - Consider OT consult to assist with ADL evaluation and planning for discharge  - Provide patient education as appropriate  - Monitor functional capacity and physical performance, use of AM PAC & JH-HLM   - Monitor gait, balance and fatigue with ambulation    Outcome: Progressing  Goal: Maintains/Returns to pre admission functional level  Description: INTERVENTIONS:  - Perform AM-PAC 6 Click Basic Mobility/ Daily Activity assessment daily.  - Set and communicate daily  mobility goal to care team and patient/family/caregiver.   - Collaborate with rehabilitation services on mobility goals if consulted  - Perform Range of Motion 3 times a day.  - Reposition patient every 2 hours.  - Dangle patient 3 times a day  - Stand patient 3 times a day  - Ambulate patient 3 times a day  - Out of bed to chair 3 times a day   - Out of bed for meals 3 times a day  - Out of bed for toileting  - Record patient progress and toleration of activity level   Outcome: Progressing     Problem: DISCHARGE PLANNING  Goal: Discharge to home or other facility with appropriate resources  Description: INTERVENTIONS:  - Identify barriers to discharge w/patient and caregiver  - Arrange for needed discharge resources and transportation as appropriate  - Identify discharge learning needs (meds, wound care, etc.)  - Arrange for interpretive services to assist at discharge as needed  - Refer to Case Management Department for coordinating discharge planning if the patient needs post-hospital services based on physician/advanced practitioner order or complex needs related to functional status, cognitive ability, or social support system  Outcome: Progressing     Problem: Knowledge Deficit  Goal: Patient/family/caregiver demonstrates understanding of disease process, treatment plan, medications, and discharge instructions  Description: Complete learning assessment and assess knowledge base.  Interventions:  - Provide teaching at level of understanding  - Provide teaching via preferred learning methods  Outcome: Progressing     Problem: Nutrition/Hydration-ADULT  Goal: Nutrient/Hydration intake appropriate for improving, restoring or maintaining nutritional needs  Description: Monitor and assess patient's nutrition/hydration status for malnutrition. Collaborate with interdisciplinary team and initiate plan and interventions as ordered.  Monitor patient's weight and dietary intake as ordered or per policy. Utilize nutrition  screening tool and intervene as necessary. Determine patient's food preferences and provide high-protein, high-caloric foods as appropriate.     INTERVENTIONS:  - Monitor oral intake, urinary output, labs, and treatment plans  - Assess nutrition and hydration status and recommend course of action  - Evaluate amount of meals eaten  - Assist patient with eating if necessary   - Allow adequate time for meals  - Recommend/ encourage appropriate diets, oral nutritional supplements, and vitamin/mineral supplements  - Order, calculate, and assess calorie counts as needed  - Recommend, monitor, and adjust tube feedings and TPN/PPN based on assessed needs  - Assess need for intravenous fluids  - Provide specific nutrition/hydration education as appropriate  - Include patient/family/caregiver in decisions related to nutrition  Outcome: Progressing     Problem: CARDIOVASCULAR - ADULT  Goal: Maintains optimal cardiac output and hemodynamic stability  Description: INTERVENTIONS:  - Monitor I/O, vital signs and rhythm  - Monitor for S/S and trends of decreased cardiac output  - Administer and titrate ordered vasoactive medications to optimize hemodynamic stability  - Assess quality of pulses, skin color and temperature  - Assess for signs of decreased coronary artery perfusion  - Instruct patient to report change in severity of symptoms  Outcome: Progressing  Goal: Absence of cardiac dysrhythmias or at baseline rhythm  Description: INTERVENTIONS:  - Continuous cardiac monitoring, vital signs, obtain 12 lead EKG if ordered  - Administer antiarrhythmic and heart rate control medications as ordered  - Monitor electrolytes and administer replacement therapy as ordered  Outcome: Progressing     Problem: RESPIRATORY - ADULT  Goal: Achieves optimal ventilation and oxygenation  Description: INTERVENTIONS:  - Assess for changes in respiratory status  - Assess for changes in mentation and behavior  - Position to facilitate oxygenation and  minimize respiratory effort  - Oxygen administered by appropriate delivery if ordered  - Initiate smoking cessation education as indicated  - Encourage broncho-pulmonary hygiene including cough, deep breathe, Incentive Spirometry  - Assess the need for suctioning and aspirate as needed  - Assess and instruct to report SOB or any respiratory difficulty  - Respiratory Therapy support as indicated  Outcome: Progressing     Problem: GASTROINTESTINAL - ADULT  Goal: Minimal or absence of nausea and/or vomiting  Description: INTERVENTIONS:  - Administer IV fluids if ordered to ensure adequate hydration  - Maintain NPO status until nausea and vomiting are resolved  - Nasogastric tube if ordered  - Administer ordered antiemetic medications as needed  - Provide nonpharmacologic comfort measures as appropriate  - Advance diet as tolerated, if ordered  - Consider nutrition services referral to assist patient with adequate nutrition and appropriate food choices  Outcome: Progressing  Goal: Maintains or returns to baseline bowel function  Description: INTERVENTIONS:  - Assess bowel function  - Encourage oral fluids to ensure adequate hydration  - Administer IV fluids if ordered to ensure adequate hydration  - Administer ordered medications as needed  - Encourage mobilization and activity  - Consider nutritional services referral to assist patient with adequate nutrition and appropriate food choices  Outcome: Progressing  Goal: Maintains adequate nutritional intake  Description: INTERVENTIONS:  - Monitor percentage of each meal consumed  - Identify factors contributing to decreased intake, treat as appropriate  - Assist with meals as needed  - Monitor I&O, weight, and lab values if indicated  - Obtain nutrition services referral as needed  Outcome: Progressing  Goal: Establish and maintain optimal ostomy function  Description: INTERVENTIONS:  - Assess bowel function  - Encourage oral fluids to ensure adequate hydration  -  Administer IV fluids if ordered to ensure adequate hydration   - Administer ordered medications as needed  - Encourage mobilization and activity  - Nutrition services referral to assist patient with appropriate food choices  - Assess stoma site  - Consider wound care consult   Outcome: Progressing  Goal: Oral mucous membranes remain intact  Description: INTERVENTIONS  - Assess oral mucosa and hygiene practices  - Implement preventative oral hygiene regimen  - Implement oral medicated treatments as ordered  - Initiate Nutrition services referral as needed  Outcome: Progressing     Problem: GENITOURINARY - ADULT  Goal: Maintains or returns to baseline urinary function  Description: INTERVENTIONS:  - Assess urinary function  - Encourage oral fluids to ensure adequate hydration if ordered  - Administer IV fluids as ordered to ensure adequate hydration  - Administer ordered medications as needed  - Offer frequent toileting  - Follow urinary retention protocol if ordered  Outcome: Progressing  Goal: Absence of urinary retention  Description: INTERVENTIONS:  - Assess patient’s ability to void and empty bladder  - Monitor I/O  - Bladder scan as needed  - Discuss with physician/AP medications to alleviate retention as needed  - Discuss catheterization for long term situations as appropriate  Outcome: Progressing  Goal: Urinary catheter remains patent  Description: INTERVENTIONS:  - Assess patency of urinary catheter  - If patient has a chronic schmitt, consider changing catheter if non-functioning  - Follow guidelines for intermittent irrigation of non-functioning urinary catheter  Outcome: Progressing     Problem: METABOLIC, FLUID AND ELECTROLYTES - ADULT  Goal: Electrolytes maintained within normal limits  Description: INTERVENTIONS:  - Monitor labs and assess patient for signs and symptoms of electrolyte imbalances  - Administer electrolyte replacement as ordered  - Monitor response to electrolyte replacements, including  repeat lab results as appropriate  - Instruct patient on fluid and nutrition as appropriate  Outcome: Progressing  Goal: Fluid balance maintained  Description: INTERVENTIONS:  - Monitor labs   - Monitor I/O and WT  - Instruct patient on fluid and nutrition as appropriate  - Assess for signs & symptoms of volume excess or deficit  Outcome: Progressing  Goal: Glucose maintained within target range  Description: INTERVENTIONS:  - Monitor Blood Glucose as ordered  - Assess for signs and symptoms of hyperglycemia and hypoglycemia  - Administer ordered medications to maintain glucose within target range  - Assess nutritional intake and initiate nutrition service referral as needed  Outcome: Progressing     Problem: SKIN/TISSUE INTEGRITY - ADULT  Goal: Skin Integrity remains intact(Skin Breakdown Prevention)  Description: Assess:    Outcome: Progressing  Goal: Incision(s), wounds(s) or drain site(s) healing without S/S of infection  Description: INTERVENTIONS  - Assess and document dressing, incision, wound bed, drain sites and surrounding tissue  - Provide patient and family education  - Perform skin care/dressing changes as ordered   Outcome: Progressing  Goal: Pressure injury heals and does not worsen  Description: Interventions:    Outcome: Progressing     Problem: Potential for Falls  Goal: Patient will remain free of falls  Description: INTERVENTIONS:  - Educate patient/family on patient safety including physical limitations  - Instruct patient to call for assistance with activity   - Consider consulting OT/PT to assist with strengthening/mobility based on AM PAC & JH-HLM score  - Consult OT/PT to assist with strengthening/mobility   - Keep Call bell within reach  - Keep bed low and locked with side rails adjusted as appropriate  - Keep care items and personal belongings within reach  - Initiate and maintain comfort rounds  - Make Fall Risk Sign visible to staff  - Offer Toileting every 2 Hours, in advance of need  -  Initiate/Maintain bed alarm  - Obtain necessary fall risk management equipment: yellow socks, yellow bracelet  - Apply yellow socks and bracelet for high fall risk patients  - Consider moving patient to room near nurses station  Outcome: Progressing

## 2025-06-21 NOTE — PROGRESS NOTES
Progress Note - Hospitalist   Name: Gold Van 79 y.o. male I MRN: [id number]  Unit/Bed#: ICU 02-01 I Date of Admission: 6/17/2025   Date of Service: 6/21/2025 I Hospital Day: 4    Assessment & Plan  Septic shock ([facility])  SHOCK STATE RESOLVED  Source: RLE osteo  Hx serratia/pseudomonas/e faecalis wound infection in past  MRI R foot-Postsurgical changes of partial fourth and fifth ray resections with a soft tissue ulcer overlying the cut surfaces of the fourth and fifth metatarsals and findings concerning for early osteomyelitis in the residual fourth metatarsal. No definite MRI evidence of osteomyelitis. The ulcer is also in close approximation to the distal third metatarsal, and early osteomyelitis in that location cannot be excluded. No evidence of an abscess  Zosyn/Vanco D5  OR planned for 6/20 with Podiatry, however, this was postponed until possibly Monday due to medical frailty, anemia  Acute renal failure superimposed on stage 2 chronic kidney disease  ([facility])  Lab Results   Component Value Date    EGFR 84 06/21/2025    EGFR 81 06/20/2025    EGFR 67 06/19/2025    CREATININE 0.81 06/21/2025    CREATININE 0.88 06/20/2025    CREATININE 1.04 06/19/2025     POA with creatinine 1.49  Creatinine is now around baseline of 0.9-1.0  Likely prerenal in setting of shock state  Daily metabolic panel and trend creatinine  Type 2 diabetes mellitus with complication, without long-term current use of insulin ([facility])  Lab Results   Component Value Date    HGBA1C 6.6 (H) 06/18/2025       Recent Labs     06/21/25  0351 06/21/25  0545 06/21/25  0753 06/21/25  0955   POCGLU 127 128 146* 198*       Blood Sugar Average: Last 72 hrs:  (P) 156.725    Continue insulin infusion for now   Home glipizide and metformin on hold   Essential hypertension  Holding home atenolol/lisinopril in post shock state  CVA (cerebrovascular accident) ([facility])  No residual deficits  Home statin  Holding clopidogrel for OR today   PAF (paroxysmal atrial  fibrillation) (HCC)  Home atenolol on hold due to shock state, resume when appropriate   Home apixaban on hold, heparin infusion pending surgical intervention  Had episodes of atrial fibrillation with RVR this morning treated with 3 doses of metoprolol IV followed by Cardizem.  Heart rate during exam today improved to 80  Hold heparin infusion at least 4 hours prior to scheduled invention time on Monday  Mixed hyperlipidemia  Continue home statin  Hyponatremia  Chronically 130-134  Stable  Metabolic panel and trend sodium  Pruritic condition  Home prednisone  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HCC)  Continue home inhaler  Gastroesophageal reflux disease without esophagitis  Continue home PPI  Severe peripheral arterial disease (HCC)  Hx R SFA stent 5/13  Home clopidogrel on hold due to the OR today  Anemia  Hemoglobin 7.8   Iron panel reviewed, hold off on IV iron due to infection  Received 1 UPRBC 6/18  Daily CBC and trend hemoglobin  Evaluate for transfusion on Sunday in the setting of pending surgical procedure and cardiac disease, anemia, anticoagulated status  Pressure injury of skin of sacral region  Wound care  Metabolic acidosis  Resolved  Monitor metabolic panel  Ambulatory dysfunction  RLE NWB  Moderate protein-calorie malnutrition (HCC)  Malnutrition Findings:        Nutrition following                       BMI Findings:           Body mass index is 19.46 kg/m².       VTE Pharmacologic Prophylaxis: VTE Score: 3 Moderate Risk (Score 3-4) - Pharmacological DVT Prophylaxis Ordered: heparin drip.    Mobility:   Basic Mobility Inpatient Raw Score: 12  JH-HLM Goal: 4: Move to chair/commode  JH-HLM Achieved: 2: Bed activities/Dependent transfer  JH-HLM Goal NOT achieved. Continue with multidisciplinary rounding and encourage appropriate mobility to improve upon JH-HLM goals.    Patient Centered Rounds: I performed bedside rounds with nursing staff today.   Discussions with Specialists or Other Care Team Provider:  Podiatry    Education and Discussions with Family / Patient: Will update patient's wife in person at bedside later today when she arrives..     Current Length of Stay: 4 day(s)  Current Patient Status: Inpatient   Certification Statement: The patient will continue to require additional inpatient hospital stay due to sepsis, anemia, pending procedure, case management.  Discharge Plan: Anticipate discharge in >72 hrs to rehab facility.    Code Status: Level 1 - Full Code    Subjective   Evaluated at bedside.  This morning he is having atrial fibrillation with RVR but denies palpitations or shortness of breath.  He does report being nervous about his upcoming procedure.  He denies nausea, abdominal pain, or diarrhea.  Eating ok.    Objective :  Temp:  [98 °F (36.7 °C)-98.8 °F (37.1 °C)] 98.3 °F (36.8 °C)  HR:  [] 136  BP: (114-152)/(56-86) 114/68  Resp:  [14-36] 22  SpO2:  [94 %-98 %] 94 %  O2 Device: None (Room air)    Body mass index is 19.46 kg/m².     Input and Output Summary (last 24 hours):     Intake/Output Summary (Last 24 hours) at 6/21/2025 1112  Last data filed at 6/21/2025 0800  Gross per 24 hour   Intake 2881.36 ml   Output 3250 ml   Net -368.64 ml       Physical Exam  Vitals and nursing note reviewed.   Constitutional:       Appearance: He is ill-appearing.   HENT:      Head: Normocephalic and atraumatic.      Nose: Nose normal.      Mouth/Throat:      Mouth: Mucous membranes are moist.      Pharynx: Oropharynx is clear.     Eyes:      Pupils: Pupils are equal, round, and reactive to light.       Cardiovascular:      Rate and Rhythm: Normal rate. Rhythm irregular.      Pulses: Normal pulses.      Comments: R IJ CVC in place with dressing intact, no erythema or drainage noted  Pulmonary:      Effort: Pulmonary effort is normal. No respiratory distress.      Breath sounds: Normal breath sounds.   Abdominal:      General: Bowel sounds are normal.      Palpations: Abdomen is soft.      Tenderness: There  is no abdominal tenderness.     Musculoskeletal:      Cervical back: Neck supple.      Right lower leg: No edema.      Left lower leg: No edema.     Skin:     General: Skin is warm and dry.      Capillary Refill: Capillary refill takes less than 2 seconds.      Comments: Dressing in place to right lower extremity clean dry and intact.  Toes pink and warm with color normal     Neurological:      General: No focal deficit present.      Mental Status: He is alert and oriented to person, place, and time. Mental status is at baseline.           Lines/Drains:  Lines/Drains/Airways       Active Status       Name Placement date Placement time Site Days    CVC Central Lines 06/17/25 Triple Right 06/17/25  1500  --  3                    Central Line:  Goal for removal: Will discontinue when meds requiring line are completed.               Lab Results: I have reviewed the following results:   Results from last 7 days   Lab Units 06/21/25 0311 06/18/25  0431 06/17/25  1112   WBC Thousand/uL 7.09   < > 17.93*   HEMOGLOBIN g/dL 7.9*   < > 8.8*   HEMATOCRIT % 25.5*   < > 28.4*   PLATELETS Thousands/uL 267   < > 361   BANDS PCT %  --   --  23*   SEGS PCT % 74   < >  --    LYMPHO PCT % 17   < > 0*   MONO PCT % 7   < > 1*   EOS PCT % 2   < > 0    < > = values in this interval not displayed.     Results from last 7 days   Lab Units 06/21/25 0311 06/18/25  1154 06/18/25  0431   SODIUM mmol/L 136   < > 134*   POTASSIUM mmol/L 3.4*   < > 3.3*   CHLORIDE mmol/L 104   < > 105   CO2 mmol/L 25   < > 20*   BUN mg/dL 9   < > 28*   CREATININE mg/dL 0.81   < > 1.39*   ANION GAP mmol/L 7   < > 9   CALCIUM mg/dL 8.3*   < > 8.4   ALBUMIN g/dL  --   --  2.9*   TOTAL BILIRUBIN mg/dL  --   --  0.27   ALK PHOS U/L  --   --  43   ALT U/L  --   --  10   AST U/L  --   --  16   GLUCOSE RANDOM mg/dL 127   < > 143*    < > = values in this interval not displayed.     Results from last 7 days   Lab Units 06/18/25  0431   INR  2.68*     Results from last 7  days   Lab Units 06/21/25  0955 06/21/25  0753 06/21/25  0545 06/21/25  0351 06/21/25  0211 06/20/25  2347 06/20/25  2155 06/20/25 2001 06/20/25  1849 06/20/25  1651 06/20/25  1457 06/20/25  1243   POC GLUCOSE mg/dl 198* 146* 128 127 132 138 144* 172* 182* 116 125 119     Results from last 7 days   Lab Units 06/18/25  0552   HEMOGLOBIN A1C % 6.6*     Results from last 7 days   Lab Units 06/19/25  0351 06/18/25  0431 06/18/25  0008 06/17/25  2135 06/17/25  1347 06/17/25  1112   LACTIC ACID mmol/L  --   --  1.6 2.1* 3.7* 3.7*   PROCALCITONIN ng/ml 18.42* 27.46*  --   --   --  14.91*       Recent Cultures (last 7 days):   Results from last 7 days   Lab Units 06/17/25  1112   BLOOD CULTURE  No Growth at 72 hrs.  Staphylococcus aureus*   GRAM STAIN RESULT  Gram positive cocci in clusters*             Last 24 Hours Medication List:     Current Facility-Administered Medications:     acetaminophen (TYLENOL) tablet 650 mg, Q6H PRN    albuterol (PROVENTIL HFA,VENTOLIN HFA) inhaler 2 puff, Q4H PRN    albuterol inhalation solution 2.5 mg, Q6H PRN    atorvastatin (LIPITOR) tablet 40 mg, QPM    Budeson-Glycopyrrol-Formoterol 160-9-4.8 MCG/ACT AERO 2 puff, Q12H SANDRA    diltiazem (CARDIZEM) 125 mg in sodium chloride 0.9 % 125 mL infusion, Titrated    diltiazem (CARDIZEM) injection 15 mg, Once    famotidine (PEPCID) tablet 20 mg, Daily    heparin (porcine) 25,000 units in 0.45% NaCl 250 mL infusion (premix), Titrated, Last Rate: 24 Units/kg/hr (06/21/25 1047)    heparin (porcine) injection 2,600 Units, Q6H PRN    heparin (porcine) injection 5,200 Units, Q6H PRN    insulin regular (HumuLIN R,NovoLIN R) 1 Units/mL in sodium chloride 0.9 % 100 mL infusion, Titrated, Last Rate: 2 Units/hr (06/21/25 1018)    lactated ringers infusion, Continuous, Last Rate: 125 mL/hr (06/21/25 1012)    montelukast (SINGULAIR) tablet 10 mg, HS    multivitamin-minerals (CENTRUM) tablet 1 tablet, Daily    ondansetron (ZOFRAN) injection 4 mg, Q6H PRN     [COMPLETED] piperacillin-tazobactam (ZOSYN) 4.5 g in sodium chloride 0.9 % 100 mL IV LOADING DOSE, Once, Last Rate: 4.5 g (06/17/25 1600) **FOLLOWED BY** piperacillin-tazobactam (ZOSYN) 4.5 g in sodium chloride 0.9 % 100 mL IVPB (EXTENDED INFUSION), Q8H, Last Rate: 4.5 g (06/21/25 1013)    polyethylene glycol (MIRALAX) packet 17 g, Daily PRN    predniSONE tablet 5 mg, Daily    senna-docusate sodium (SENOKOT S) 8.6-50 mg per tablet 2 tablet, HS    vancomycin (VANCOCIN) 1500 mg in sodium chloride 0.9% 250 mL IVPB, Q24H    Administrative Statements   Today, Patient Was Seen By: BELGICA Thompson      **Please Note: This note may have been constructed using a voice recognition system.**

## 2025-06-21 NOTE — PHYSICAL THERAPY NOTE
Physical Therapy Cancellation Note       06/21/25 0713   PT Last Visit   PT Visit Date 06/21/25   Note Type   Note type Cancelled Session   Additional Comments OR 6-23     Leeanna Henao

## 2025-06-21 NOTE — ASSESSMENT & PLAN NOTE
Malnutrition Findings:        Nutrition following                       BMI Findings:           Body mass index is 19.46 kg/m².

## 2025-06-21 NOTE — PLAN OF CARE
Problem: Prexisting or High Potential for Compromised Skin Integrity  Goal: Skin integrity is maintained or improved  Description: INTERVENTIONS:  - Identify patients at risk for skin breakdown  - Assess and monitor skin integrity including under and around medical devices   - Assess and monitor nutrition and hydration status  - Monitor labs  - Assess for incontinence   - Turn and reposition patient  - Assist with mobility/ambulation  - Relieve pressure over joycelyn prominences   - Avoid friction and shearing  - Provide appropriate hygiene as needed including keeping skin clean and dry  - Evaluate need for skin moisturizer/barrier cream  - Collaborate with interdisciplinary team  - Patient/family teaching  - Consider wound care consult    Assess:  - Review Sae scale daily  - Clean and moisturize skin every shift  - Inspect skin when repositioning, toileting, and assisting with ADLS  - Assess under medical devices   - Assess extremities for adequate circulation and sensation     Bed Management:  - Have minimal linens on bed & keep smooth, unwrinkled  - Change linens as needed when moist or perspiring  - Avoid sitting or lying in one position for more than 2 hours while in bed?Keep HOB at 30 degrees   - Toileting:  - Offer bedside commode  - Assess for incontinence   - Use incontinent care products after each incontinent episode     Activity:  - Mobilize patient 3 times a day  - Encourage activity and walks on unit  - Encourage or provide ROM exercises   - Turn and reposition patient every 2 Hours  - Use appropriate equipment to lift or move patient in bed  - Instruct/ Assist with weight shifting every 60 when out of bed in chair  - Consider limitation of chair time 2 hour intervals    Skin Care:  - Avoid use of baby powder, tape, friction and shearing, hot water or constrictive clothing  - Relieve pressure over bony prominences using mepelex  - Do not massage red bony areas    Next Steps:  - Teach patient  strategies to minimize risks  - Consider consults to  interdisciplinary teams   Outcome: Progressing     Problem: PAIN - ADULT  Goal: Verbalizes/displays adequate comfort level or baseline comfort level  Description: Interventions:  - Encourage patient to monitor pain and request assistance  - Assess pain using appropriate pain scale  - Administer analgesics as ordered based on type and severity of pain and evaluate response  - Implement non-pharmacological measures as appropriate and evaluate response  - Consider cultural and social influences on pain and pain management  - Notify physician/advanced practitioner if interventions unsuccessful or patient reports new pain  - Educate patient/family on pain management process including their role and importance of  reporting pain   - Provide non-pharmacologic/complimentary pain relief interventions  Outcome: Progressing     Problem: INFECTION - ADULT  Goal: Absence or prevention of progression during hospitalization  Description: INTERVENTIONS:  - Assess and monitor for signs and symptoms of infection  - Monitor lab/diagnostic results  - Monitor all insertion sites, i.e. indwelling lines, tubes, and drains  - Monitor endotracheal if appropriate and nasal secretions for changes in amount and color  - Rueter appropriate cooling/warming therapies per order  - Administer medications as ordered  - Instruct and encourage patient and family to use good hand hygiene technique  - Identify and instruct in appropriate isolation precautions for identified infection/condition  Outcome: Progressing  Goal: Absence of fever/infection during neutropenic period  Description: INTERVENTIONS:  - Monitor WBC  - Perform strict hand hygiene  - Limit to healthy visitors only  - No plants, dried, fresh or silk flowers with otoole in patient room  Outcome: Progressing     Problem: SAFETY ADULT  Goal: Patient will remain free of falls  Description: INTERVENTIONS:  - Educate patient/family on  patient safety including physical limitations  - Instruct patient to call for assistance with activity   - Consider consulting OT/PT to assist with strengthening/mobility based on AM PAC & JH-HLM score  - Consult OT/PT to assist with strengthening/mobility   - Keep Call bell within reach  - Keep bed low and locked with side rails adjusted as appropriate  - Keep care items and personal belongings within reach  - Initiate and maintain comfort rounds  - Make Fall Risk Sign visible to staff  - Offer Toileting every 2 Hours, in advance of need  - Initiate/Maintain bed alarm  - Obtain necessary fall risk management equipment  - Apply yellow socks and bracelet for high fall risk patients  - Consider moving patient to room near nurses station  Outcome: Progressing  Goal: Maintain or return to baseline ADL function  Description: INTERVENTIONS:  -  Assess patient's ability to carry out ADLs; assess patient's baseline for ADL function and identify physical deficits which impact ability to perform ADLs (bathing, care of mouth/teeth, toileting, grooming, dressing, etc.)  - Assess/evaluate cause of self-care deficits   - Assess range of motion  - Assess patient's mobility; develop plan if impaired  - Assess patient's need for assistive devices and provide as appropriate  - Encourage maximum independence but intervene and supervise when necessary  - Involve family in performance of ADLs  - Assess for home care needs following discharge   - Consider OT consult to assist with ADL evaluation and planning for discharge  - Provide patient education as appropriate  - Monitor functional capacity and physical performance, use of AM PAC & JH-HLM   - Monitor gait, balance and fatigue with ambulation    Outcome: Progressing  Goal: Maintains/Returns to pre admission functional level  Description: INTERVENTIONS:  - Perform AM-PAC 6 Click Basic Mobility/ Daily Activity assessment daily.  - Set and communicate daily mobility goal to care team  and patient/family/caregiver.   - Collaborate with rehabilitation services on mobility goals if consulted  - Perform Range of Motion 3 times a day.  - Reposition patient every 2 hours.  - Dangle patient 3 times a day  - Record patient progress and toleration of activity level   Outcome: Progressing

## 2025-06-21 NOTE — ASSESSMENT & PLAN NOTE
Hemoglobin 7.8   Iron panel reviewed, hold off on IV iron due to infection  Received 1 UPRBC 6/18  Daily CBC and trend hemoglobin  Evaluate for transfusion on Sunday in the setting of pending surgical procedure and cardiac disease, anemia, anticoagulated status

## 2025-06-21 NOTE — PROGRESS NOTES
Gold Van is a 79 y.o. male who is currently ordered Vancomycin IV with management by the Pharmacy Consult service.  Relevant clinical data and objective / subjective history reviewed.  Vancomycin Assessment:  Indication and Goal AUC/Trough: Bone/joint infection (goal -600, trough >10), -600, trough >10  Clinical Status: worsening  Micro:     Renal Function:  SCr: 0.81 mg/dL  CrCl: 68.1 mL/min  Renal replacement: Not on dialysis  Days of Therapy: 5  Current Dose: 1500 mg q24  Vancomycin Plan:  New Dosing: continue 1500 mg q24  Estimated AUC: 482 mcg*hr/mL  Estimated Trough: 10.4 mcg/mL  Next Level: 6/23 6am  Renal Function Monitoring: Daily BMP and UOP  Pharmacy will continue to follow closely for s/sx of nephrotoxicity, infusion reactions and appropriateness of therapy.  BMP and CBC will be ordered per protocol. We will continue to follow the patient’s culture results and clinical progress daily.    Laith Higgins, Pharmacist

## 2025-06-21 NOTE — ASSESSMENT & PLAN NOTE
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 No definite MRI evidence of osteomyelitis. The ulcer is also in close approximation to the distal third metatarsal, and early osteomyelitis in that location cannot be excluded. No evidence of an abscess  Zosyn/Vanco D5  OR planned for 6/20 with Podiatry, however, this was postponed until possibly Monday due to medical frailty, anemia

## 2025-06-21 NOTE — NURSING NOTE
1900: Assumed care of this pt from day shift RN. VS obtained (see flowsheets). Verified gtts. Reached out to provider D/T non therapeutic heparin drip now at max doseage.     2000: Heparin orders adjusted per provider order to VTE high.     2049: Heparin bolus 5200 units given and heparin gtt drip adjusted to new protocol (VTE high). Now infusing at 18 units/kg/hr.     0000: insulin drip adjusted per protocol (see MAR).     0345: aPTT 46. Rebolused with 2600 units heparin and increased heparin gtt to 20 units/kg/hr.    0405: pt went in AF-RVR with highest . 5 mg IV Lopressor given x 3 -> not effective.    0501: 20mg Cardizem given.     0604: Pt complaining of 3/10 R sided dull rib pain. PRN Tylenol given.

## 2025-06-21 NOTE — ASSESSMENT & PLAN NOTE
Lab Results   Component Value Date    HGBA1C 6.6 (H) 06/18/2025       Recent Labs     06/21/25  0351 06/21/25  0545 06/21/25  0753 06/21/25  0955   POCGLU 127 128 146* 198*       Blood Sugar Average: Last 72 hrs:  (P) 156.725    Continue insulin infusion for now   Home glipizide and metformin on hold

## 2025-06-22 ENCOUNTER — ANESTHESIA EVENT (INPATIENT)
Dept: PERIOP | Facility: HOSPITAL | Age: 80
End: 2025-06-22
Payer: COMMERCIAL

## 2025-06-22 LAB
ANION GAP SERPL CALCULATED.3IONS-SCNC: 8 MMOL/L (ref 4–13)
APTT PPP: 61 SECONDS (ref 23–34)
BACTERIA BLD CULT: NORMAL
BASOPHILS # BLD AUTO: 0.02 THOUSANDS/ÂΜL (ref 0–0.1)
BASOPHILS NFR BLD AUTO: 0 % (ref 0–1)
BUN SERPL-MCNC: 8 MG/DL (ref 5–25)
CALCIUM SERPL-MCNC: 8.3 MG/DL (ref 8.4–10.2)
CHLORIDE SERPL-SCNC: 104 MMOL/L (ref 96–108)
CO2 SERPL-SCNC: 24 MMOL/L (ref 21–32)
CREAT SERPL-MCNC: 0.77 MG/DL (ref 0.6–1.3)
EOSINOPHIL # BLD AUTO: 0.12 THOUSAND/ÂΜL (ref 0–0.61)
EOSINOPHIL NFR BLD AUTO: 2 % (ref 0–6)
ERYTHROCYTE [DISTWIDTH] IN BLOOD BY AUTOMATED COUNT: 14.8 % (ref 11.6–15.1)
GFR SERPL CREATININE-BSD FRML MDRD: 86 ML/MIN/1.73SQ M
GLUCOSE SERPL-MCNC: 104 MG/DL (ref 65–140)
GLUCOSE SERPL-MCNC: 108 MG/DL (ref 65–140)
GLUCOSE SERPL-MCNC: 108 MG/DL (ref 65–140)
GLUCOSE SERPL-MCNC: 111 MG/DL (ref 65–140)
GLUCOSE SERPL-MCNC: 111 MG/DL (ref 65–140)
GLUCOSE SERPL-MCNC: 114 MG/DL (ref 65–140)
GLUCOSE SERPL-MCNC: 116 MG/DL (ref 65–140)
GLUCOSE SERPL-MCNC: 124 MG/DL (ref 65–140)
GLUCOSE SERPL-MCNC: 143 MG/DL (ref 65–140)
GLUCOSE SERPL-MCNC: 161 MG/DL (ref 65–140)
GLUCOSE SERPL-MCNC: 211 MG/DL (ref 65–140)
GLUCOSE SERPL-MCNC: 223 MG/DL (ref 65–140)
GLUCOSE SERPL-MCNC: 262 MG/DL (ref 65–140)
HCT VFR BLD AUTO: 24.7 % (ref 36.5–49.3)
HGB BLD-MCNC: 7.7 G/DL (ref 12–17)
IMM GRANULOCYTES # BLD AUTO: 0.04 THOUSAND/UL (ref 0–0.2)
IMM GRANULOCYTES NFR BLD AUTO: 1 % (ref 0–2)
LYMPHOCYTES # BLD AUTO: 1.33 THOUSANDS/ÂΜL (ref 0.6–4.47)
LYMPHOCYTES NFR BLD AUTO: 20 % (ref 14–44)
MCH RBC QN AUTO: 27.7 PG (ref 26.8–34.3)
MCHC RBC AUTO-ENTMCNC: 31.2 G/DL (ref 31.4–37.4)
MCV RBC AUTO: 89 FL (ref 82–98)
MONOCYTES # BLD AUTO: 0.5 THOUSAND/ÂΜL (ref 0.17–1.22)
MONOCYTES NFR BLD AUTO: 8 % (ref 4–12)
NEUTROPHILS # BLD AUTO: 4.53 THOUSANDS/ÂΜL (ref 1.85–7.62)
NEUTS SEG NFR BLD AUTO: 69 % (ref 43–75)
NRBC BLD AUTO-RTO: 0 /100 WBCS
PLATELET # BLD AUTO: 271 THOUSANDS/UL (ref 149–390)
PMV BLD AUTO: 9.6 FL (ref 8.9–12.7)
POTASSIUM SERPL-SCNC: 3.3 MMOL/L (ref 3.5–5.3)
RBC # BLD AUTO: 2.78 MILLION/UL (ref 3.88–5.62)
SODIUM SERPL-SCNC: 136 MMOL/L (ref 135–147)
WBC # BLD AUTO: 6.54 THOUSAND/UL (ref 4.31–10.16)

## 2025-06-22 PROCEDURE — 94760 N-INVAS EAR/PLS OXIMETRY 1: CPT

## 2025-06-22 PROCEDURE — 82948 REAGENT STRIP/BLOOD GLUCOSE: CPT

## 2025-06-22 PROCEDURE — 85025 COMPLETE CBC W/AUTO DIFF WBC: CPT | Performed by: NURSE PRACTITIONER

## 2025-06-22 PROCEDURE — 85730 THROMBOPLASTIN TIME PARTIAL: CPT | Performed by: PHYSICIAN ASSISTANT

## 2025-06-22 PROCEDURE — 30233N1 TRANSFUSION OF NONAUTOLOGOUS RED BLOOD CELLS INTO PERIPHERAL VEIN, PERCUTANEOUS APPROACH: ICD-10-PCS | Performed by: NURSE PRACTITIONER

## 2025-06-22 PROCEDURE — 80048 BASIC METABOLIC PNL TOTAL CA: CPT | Performed by: NURSE PRACTITIONER

## 2025-06-22 PROCEDURE — 99233 SBSQ HOSP IP/OBS HIGH 50: CPT | Performed by: NURSE PRACTITIONER

## 2025-06-22 PROCEDURE — P9016 RBC LEUKOCYTES REDUCED: HCPCS

## 2025-06-22 PROCEDURE — 94640 AIRWAY INHALATION TREATMENT: CPT

## 2025-06-22 RX ORDER — POTASSIUM CHLORIDE 1500 MG/1
40 TABLET, EXTENDED RELEASE ORAL ONCE
Status: COMPLETED | OUTPATIENT
Start: 2025-06-22 | End: 2025-06-22

## 2025-06-22 RX ORDER — POTASSIUM CHLORIDE 14.9 MG/ML
20 INJECTION INTRAVENOUS ONCE
Status: COMPLETED | OUTPATIENT
Start: 2025-06-22 | End: 2025-06-22

## 2025-06-22 RX ADMIN — SODIUM CHLORIDE 8 UNITS/HR: 9 INJECTION, SOLUTION INTRAVENOUS at 16:37

## 2025-06-22 RX ADMIN — VANCOMYCIN HYDROCHLORIDE 750 MG: 1 INJECTION, POWDER, LYOPHILIZED, FOR SOLUTION INTRAVENOUS at 19:53

## 2025-06-22 RX ADMIN — VANCOMYCIN HYDROCHLORIDE 750 MG: 1 INJECTION, POWDER, LYOPHILIZED, FOR SOLUTION INTRAVENOUS at 08:43

## 2025-06-22 RX ADMIN — Medication 4.5 G: at 09:26

## 2025-06-22 RX ADMIN — MONTELUKAST 10 MG: 10 TABLET, FILM COATED ORAL at 21:15

## 2025-06-22 RX ADMIN — ATORVASTATIN CALCIUM 40 MG: 40 TABLET, FILM COATED ORAL at 17:02

## 2025-06-22 RX ADMIN — BUDESONIDE, GLYCOPYRROLATE, AND FORMOTEROL FUMARATE 2 PUFF: 160; 9; 4.8 AEROSOL, METERED RESPIRATORY (INHALATION) at 21:16

## 2025-06-22 RX ADMIN — HEPARIN SODIUM 26 UNITS/KG/HR: 10000 INJECTION, SOLUTION INTRAVENOUS at 15:49

## 2025-06-22 RX ADMIN — POTASSIUM CHLORIDE 40 MEQ: 1500 TABLET, EXTENDED RELEASE ORAL at 06:36

## 2025-06-22 RX ADMIN — SODIUM CHLORIDE, SODIUM LACTATE, POTASSIUM CHLORIDE, AND CALCIUM CHLORIDE 125 ML/HR: .6; .31; .03; .02 INJECTION, SOLUTION INTRAVENOUS at 04:15

## 2025-06-22 RX ADMIN — POTASSIUM CHLORIDE 20 MEQ: 14.9 INJECTION, SOLUTION INTRAVENOUS at 07:30

## 2025-06-22 RX ADMIN — HEPARIN SODIUM 26 UNITS/KG/HR: 10000 INJECTION, SOLUTION INTRAVENOUS at 00:02

## 2025-06-22 RX ADMIN — Medication 4.5 G: at 17:02

## 2025-06-22 RX ADMIN — PREDNISONE 5 MG: 5 TABLET ORAL at 08:47

## 2025-06-22 RX ADMIN — Medication 1 TABLET: at 08:47

## 2025-06-22 RX ADMIN — FAMOTIDINE 20 MG: 20 TABLET, FILM COATED ORAL at 08:47

## 2025-06-22 RX ADMIN — Medication 4.5 G: at 02:09

## 2025-06-22 RX ADMIN — BUDESONIDE, GLYCOPYRROLATE, AND FORMOTEROL FUMARATE 2 PUFF: 160; 9; 4.8 AEROSOL, METERED RESPIRATORY (INHALATION) at 09:26

## 2025-06-22 RX ADMIN — ALBUTEROL SULFATE 2.5 MG: 2.5 SOLUTION RESPIRATORY (INHALATION) at 13:13

## 2025-06-22 NOTE — ASSESSMENT & PLAN NOTE
Lab Results   Component Value Date    HGBA1C 6.6 (H) 06/18/2025       Recent Labs     06/21/25  2350 06/22/25  0158 06/22/25  0418 06/22/25  0608   POCGLU 113 104 111 111       Blood Sugar Average: Last 72 hrs:  (P) 153.3969457105342756    Glucose controlled  Continue insulin infusion for now   Home glipizide and metformin on hold

## 2025-06-22 NOTE — ASSESSMENT & PLAN NOTE
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 No definite MRI evidence of osteomyelitis. The ulcer is also in close approximation to the distal third metatarsal, and early osteomyelitis in that location cannot be excluded. No evidence of an abscess  Zosyn/Vanco D6  OR planned for 6/20 with Podiatry, however, this was postponed until possibly Monday due to medical frailty, anemia

## 2025-06-22 NOTE — ASSESSMENT & PLAN NOTE
Malnutrition Findings:        Nutrition following                       BMI Findings:           Body mass index is 19.61 kg/m².

## 2025-06-22 NOTE — ASSESSMENT & PLAN NOTE
Hemoglobin 7.8   Iron panel reviewed, hold off on IV iron due to infection  Received 1 UPRBC 6/18  Daily CBC and trend hemoglobin  Transfusion today in the setting of pending surgical procedure and cardiac disease, anemia, anticoagulated status. Patient does have antibodies, could lead to potential delay in obtaining PRBCs

## 2025-06-22 NOTE — ASSESSMENT & PLAN NOTE
Lab Results   Component Value Date    EGFR 86 06/22/2025    EGFR 84 06/21/2025    EGFR 81 06/20/2025    CREATININE 0.77 06/22/2025    CREATININE 0.81 06/21/2025    CREATININE 0.88 06/20/2025     POA with creatinine 1.49  Creatinine is now around baseline of 0.9-1.0  Likely prerenal in setting of shock state  Daily metabolic panel and trend creatinine

## 2025-06-22 NOTE — ASSESSMENT & PLAN NOTE
Home atenolol on hold due to shock state, resume when appropriate   Home apixaban on hold, heparin infusion pending surgical intervention  Had episodes of atrial fibrillation with RVR this morning treated with 3 doses of metoprolol IV followed by Cardizem.  Heart rate during exam today improved to 80  Hold heparin infusion at least 6-8 hours prior to scheduled invention time on Monday. Timed to end at midnight tonight pending finalization of schedule

## 2025-06-22 NOTE — PLAN OF CARE
Problem: PAIN - ADULT  Goal: Verbalizes/displays adequate comfort level or baseline comfort level  Description: Interventions:  - Encourage patient to monitor pain and request assistance  - Assess pain using appropriate pain scale  - Administer analgesics as ordered based on type and severity of pain and evaluate response  - Implement non-pharmacological measures as appropriate and evaluate response  - Consider cultural and social influences on pain and pain management  - Notify physician/advanced practitioner if interventions unsuccessful or patient reports new pain  - Educate patient/family on pain management process including their role and importance of  reporting pain   - Provide non-pharmacologic/complimentary pain relief interventions  Outcome: Progressing     Problem: INFECTION - ADULT  Goal: Absence or prevention of progression during hospitalization  Description: INTERVENTIONS:  - Assess and monitor for signs and symptoms of infection  - Monitor lab/diagnostic results  - Monitor all insertion sites, i.e. indwelling lines, tubes, and drains  - Monitor endotracheal if appropriate and nasal secretions for changes in amount and color  - [place] appropriate cooling/warming therapies per order  - Administer medications as ordered  - Instruct and encourage patient and family to use good hand hygiene technique  - Identify and instruct in appropriate isolation precautions for identified infection/condition  Outcome: Progressing  Goal: Absence of fever/infection during neutropenic period  Description: INTERVENTIONS:  - Monitor WBC  - Perform strict hand hygiene  - Limit to healthy visitors only  - No plants, dried, fresh or silk flowers with otoole in patient room  Outcome: Progressing

## 2025-06-22 NOTE — PROGRESS NOTES
2230: Pt bathed, linens changed & repositioned for comfort. LE allevyn dressings changed. Scheduled PTT collected & sent to lab.     2312: Received PTT result; 70. Heparin gtt therapeutic x1.     0200: Pt woke up to void & unable to fall back to sleep. Call bell & necessities within reach. Insulin gtt being adjusted as needed per protocol. Now infusing at 0.5 u/hr in algorithm 2.     0425: Morning blood work obtained & sent to lab. Pt continues to void adequate amounts of urine in urinal.     0520: PTT therapeutic x2. Next PTT 6/23/25 am.

## 2025-06-22 NOTE — PROGRESS NOTES
Progress Note - Hospitalist   Name: Gold Van 79 y.o. male I MRN: [id number]  Unit/Bed#: ICU 02-01 I Date of Admission: 6/17/2025   Date of Service: 6/22/2025 I Hospital Day: 5    Assessment & Plan  Septic shock ([facility])  SHOCK STATE RESOLVED  Source: RLE osteo  Hx serratia/pseudomonas/e faecalis wound infection in past  MRI R foot-Postsurgical changes of partial fourth and fifth ray resections with a soft tissue ulcer overlying the cut surfaces of the fourth and fifth metatarsals and findings concerning for early osteomyelitis in the residual fourth metatarsal. No definite MRI evidence of osteomyelitis. The ulcer is also in close approximation to the distal third metatarsal, and early osteomyelitis in that location cannot be excluded. No evidence of an abscess  Zosyn/Vanco D6  OR planned for 6/20 with Podiatry, however, this was postponed until possibly Monday due to medical frailty, anemia  Acute renal failure superimposed on stage 2 chronic kidney disease  ([facility])  Lab Results   Component Value Date    EGFR 86 06/22/2025    EGFR 84 06/21/2025    EGFR 81 06/20/2025    CREATININE 0.77 06/22/2025    CREATININE 0.81 06/21/2025    CREATININE 0.88 06/20/2025     POA with creatinine 1.49  Creatinine is now around baseline of 0.9-1.0  Likely prerenal in setting of shock state  Daily metabolic panel and trend creatinine  Type 2 diabetes mellitus with complication, without long-term current use of insulin ([facility])  Lab Results   Component Value Date    HGBA1C 6.6 (H) 06/18/2025       Recent Labs     06/21/25  2350 06/22/25  0158 06/22/25  0418 06/22/25  0608   POCGLU 113 104 111 111       Blood Sugar Average: Last 72 hrs:  (P) [id number]    Glucose controlled  Continue insulin infusion for now   Home glipizide and metformin on hold   Essential hypertension  Holding home atenolol/lisinopril in post shock state  CVA (cerebrovascular accident) ([facility])  No residual deficits  Home statin  Holding clopidogrel for OR today    PAF (paroxysmal atrial fibrillation) (HCC)  Home atenolol on hold due to shock state, resume when appropriate   Home apixaban on hold, heparin infusion pending surgical intervention  Had episodes of atrial fibrillation with RVR this morning treated with 3 doses of metoprolol IV followed by Cardizem.  Heart rate during exam today improved to 80  Hold heparin infusion at least 6-8 hours prior to scheduled invention time on Monday. Timed to end at midnight tonight pending finalization of schedule  Mixed hyperlipidemia  Continue home statin  Hyponatremia  Chronically 130-134  Stable  Metabolic panel and trend sodium  Pruritic condition  Home prednisone  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HCC)  Continue home inhaler  Gastroesophageal reflux disease without esophagitis  Continue home PPI  Severe peripheral arterial disease (HCC)  Hx R SFA stent 5/13  Home clopidogrel on hold due to plan for OR  Anemia  Hemoglobin 7.8   Iron panel reviewed, hold off on IV iron due to infection  Received 1 UPRBC 6/18  Daily CBC and trend hemoglobin  Transfusion today in the setting of pending surgical procedure and cardiac disease, anemia, anticoagulated status. Patient does have antibodies, could lead to potential delay in obtaining PRBCs  Pressure injury of skin of sacral region  Wound care  Metabolic acidosis  Resolved  Monitor metabolic panel  Ambulatory dysfunction  RLE NWB  Moderate protein-calorie malnutrition (HCC)  Malnutrition Findings:        Nutrition following                       BMI Findings:           Body mass index is 19.61 kg/m².       VTE Pharmacologic Prophylaxis: VTE Score: 3 Moderate Risk (Score 3-4) - Pharmacological DVT Prophylaxis Ordered: heparin drip.    Mobility:   Basic Mobility Inpatient Raw Score: 12  -HLM Goal: 4: Move to chair/commode  JH-HLM Achieved: 2: Bed activities/Dependent transfer  JH-HLM Goal NOT achieved. Continue with multidisciplinary rounding and encourage appropriate mobility to improve upon -HLM  goals.    Patient Centered Rounds: I performed bedside rounds with nursing staff today.     Education and Discussions with Family / Patient: Will update patient's wife at bedside when she arrives today.     Current Length of Stay: 5 day(s)  Current Patient Status: Inpatient   Certification Statement: The patient will continue to require additional inpatient hospital stay due to planned procedure tomorrow, sepsis.  Discharge Plan: Anticipate discharge in 48-72 hrs to rehab facility.    Code Status: Level 1 - Full Code    Subjective   Evaluated at bedside.  He said he did not sleep well last night because he was urinating a lot.  Denies dysuria.  No new physical complaints.  No overnight events.    Objective :  Temp:  [98 °F (36.7 °C)-99.5 °F (37.5 °C)] 98.9 °F (37.2 °C)  HR:  [] 92  BP: (114-165)/(59-75) 165/68  Resp:  [16-36] 20  SpO2:  [93 %-98 %] 94 %  O2 Device: None (Room air)    Body mass index is 19.61 kg/m².     Input and Output Summary (last 24 hours):     Intake/Output Summary (Last 24 hours) at 6/22/2025 0710  Last data filed at 6/22/2025 0638  Gross per 24 hour   Intake 2960.87 ml   Output 4050 ml   Net -1089.13 ml       Physical Exam  Vitals and nursing note reviewed.   Constitutional:       Appearance: He is ill-appearing.      Comments: Looks a little better today, less fatigued   HENT:      Head: Normocephalic and atraumatic.      Nose: Nose normal.      Mouth/Throat:      Mouth: Mucous membranes are moist.      Pharynx: Oropharynx is clear.     Eyes:      Pupils: Pupils are equal, round, and reactive to light.       Cardiovascular:      Rate and Rhythm: Normal rate and regular rhythm.      Pulses: Normal pulses.      Heart sounds: Normal heart sounds.   Pulmonary:      Effort: Pulmonary effort is normal. No respiratory distress.      Breath sounds: Normal breath sounds.   Abdominal:      General: Bowel sounds are normal.      Palpations: Abdomen is soft.      Tenderness: There is no abdominal  tenderness.     Musculoskeletal:      Cervical back: Neck supple.      Right lower leg: No edema.      Left lower leg: No edema.     Skin:     General: Skin is warm and dry.      Capillary Refill: Capillary refill takes less than 2 seconds.      Comments: Right foot dressing in place     Neurological:      General: No focal deficit present.      Mental Status: He is alert and oriented to person, place, and time.           Lines/Drains:  Lines/Drains/Airways       Active Status       Name Placement date Placement time Site Days    CVC Central Lines 06/17/25 Triple Right 06/17/25  1500  --  4                    Central Line:  Goal for removal: Will discontinue when meds requiring line are completed.               Lab Results: I have reviewed the following results:   Results from last 7 days   Lab Units 06/22/25  0426 06/18/25  0431 06/17/25  1112   WBC Thousand/uL 6.54   < > 17.93*   HEMOGLOBIN g/dL 7.7*   < > 8.8*   HEMATOCRIT % 24.7*   < > 28.4*   PLATELETS Thousands/uL 271   < > 361   BANDS PCT %  --   --  23*   SEGS PCT % 69   < >  --    LYMPHO PCT % 20   < > 0*   MONO PCT % 8   < > 1*   EOS PCT % 2   < > 0    < > = values in this interval not displayed.     Results from last 7 days   Lab Units 06/22/25  0426 06/18/25  1154 06/18/25  0431   SODIUM mmol/L 136   < > 134*   POTASSIUM mmol/L 3.3*   < > 3.3*   CHLORIDE mmol/L 104   < > 105   CO2 mmol/L 24   < > 20*   BUN mg/dL 8   < > 28*   CREATININE mg/dL 0.77   < > 1.39*   ANION GAP mmol/L 8   < > 9   CALCIUM mg/dL 8.3*   < > 8.4   ALBUMIN g/dL  --   --  2.9*   TOTAL BILIRUBIN mg/dL  --   --  0.27   ALK PHOS U/L  --   --  43   ALT U/L  --   --  10   AST U/L  --   --  16   GLUCOSE RANDOM mg/dL 108   < > 143*    < > = values in this interval not displayed.     Results from last 7 days   Lab Units 06/18/25  0431   INR  2.68*     Results from last 7 days   Lab Units 06/22/25  0608 06/22/25  0418 06/22/25  0158 06/21/25  2350 06/21/25  2158 06/21/25  2008 06/21/25  1807  06/21/25  1615 06/21/25  1358 06/21/25  1212 06/21/25  0955 06/21/25  0753   POC GLUCOSE mg/dl 111 111 104 113 149* 110 210* 279* 318* 232* 198* 146*     Results from last 7 days   Lab Units 06/18/25  0552   HEMOGLOBIN A1C % 6.6*     Results from last 7 days   Lab Units 06/19/25  0351 06/18/25  0431 06/18/25  0008 06/17/25  2135 06/17/25  1347 06/17/25  1112   LACTIC ACID mmol/L  --   --  1.6 2.1* 3.7* 3.7*   PROCALCITONIN ng/ml 18.42* 27.46*  --   --   --  14.91*       Recent Cultures (last 7 days):   Results from last 7 days   Lab Units 06/17/25  1112   BLOOD CULTURE  No Growth After 4 Days.  Staphylococcus aureus*   GRAM STAIN RESULT  Gram positive cocci in clusters*             Last 24 Hours Medication List:     Current Facility-Administered Medications:     acetaminophen (TYLENOL) tablet 650 mg, Q6H PRN    albuterol (PROVENTIL HFA,VENTOLIN HFA) inhaler 2 puff, Q4H PRN    albuterol inhalation solution 2.5 mg, Q6H PRN    atorvastatin (LIPITOR) tablet 40 mg, QPM    Budeson-Glycopyrrol-Formoterol 160-9-4.8 MCG/ACT AERO 2 puff, Q12H SANDRA    diltiazem (CARDIZEM) 125 mg in sodium chloride 0.9 % 125 mL infusion, Titrated    diltiazem (CARDIZEM) injection 15 mg, Once    famotidine (PEPCID) tablet 20 mg, Daily    heparin (porcine) 25,000 units in 0.45% NaCl 250 mL infusion (premix), Titrated, Last Rate: 26 Units/kg/hr (06/22/25 0002)    heparin (porcine) injection 2,600 Units, Q6H PRN    heparin (porcine) injection 5,200 Units, Q6H PRN    insulin regular (HumuLIN R,NovoLIN R) 1 Units/mL in sodium chloride 0.9 % 100 mL infusion, Titrated, Last Rate: 1.5 Units/hr (06/22/25 0421)    lactated ringers infusion, Continuous, Last Rate: 125 mL/hr (06/22/25 2298)    montelukast (SINGULAIR) tablet 10 mg, HS    multivitamin-minerals (CENTRUM) tablet 1 tablet, Daily    ondansetron (ZOFRAN) injection 4 mg, Q6H PRN    [COMPLETED] piperacillin-tazobactam (ZOSYN) 4.5 g in sodium chloride 0.9 % 100 mL IV LOADING DOSE, Once, Last Rate:  4.5 g (06/17/25 1600) **FOLLOWED BY** piperacillin-tazobactam (ZOSYN) 4.5 g in sodium chloride 0.9 % 100 mL IVPB (EXTENDED INFUSION), Q8H, Last Rate: 4.5 g (06/22/25 0209)    polyethylene glycol (MIRALAX) packet 17 g, Daily PRN    potassium chloride 20 mEq IVPB (premix), Once    predniSONE tablet 5 mg, Daily    senna-docusate sodium (SENOKOT S) 8.6-50 mg per tablet 2 tablet, HS    vancomycin 750 mg in sodium chloride 0.9% 250 mL, Q12H    Administrative Statements   Today, Patient Was Seen By: BELGICA Thompson      **Please Note: This note may have been constructed using a voice recognition system.**

## 2025-06-22 NOTE — PLAN OF CARE
Problem: PAIN - ADULT  Goal: Verbalizes/displays adequate comfort level or baseline comfort level  Description: Interventions:  - Encourage patient to monitor pain and request assistance  - Assess pain using appropriate pain scale  - Administer analgesics as ordered based on type and severity of pain and evaluate response  - Implement non-pharmacological measures as appropriate and evaluate response  - Consider cultural and social influences on pain and pain management  - Notify physician/advanced practitioner if interventions unsuccessful or patient reports new pain  - Educate patient/family on pain management process including their role and importance of  reporting pain   - Provide non-pharmacologic/complimentary pain relief interventions  Outcome: Progressing     Problem: CARDIOVASCULAR - ADULT  Goal: Maintains optimal cardiac output and hemodynamic stability  Description: INTERVENTIONS:  - Monitor I/O, vital signs and rhythm  - Monitor for S/S and trends of decreased cardiac output  - Administer and titrate ordered vasoactive medications to optimize hemodynamic stability  - Assess quality of pulses, skin color and temperature  - Assess for signs of decreased coronary artery perfusion  - Instruct patient to report change in severity of symptoms  Outcome: Progressing     Problem: CARDIOVASCULAR - ADULT  Goal: Absence of cardiac dysrhythmias or at baseline rhythm  Description: INTERVENTIONS:  - Continuous cardiac monitoring, vital signs, obtain 12 lead EKG if ordered  - Administer antiarrhythmic and heart rate control medications as ordered  - Monitor electrolytes and administer replacement therapy as ordered  Outcome: Progressing

## 2025-06-22 NOTE — PROGRESS NOTES
Gold Van is a 79 y.o. male who is currently ordered Vancomycin IV with management by the Pharmacy Consult service.  Relevant clinical data and objective / subjective history reviewed.  Vancomycin Assessment:  Indication and Goal AUC/Trough: Bone/joint infection (goal -600, trough >10), -600, trough >10  Clinical Status: stable  Micro:     Renal Function:  SCr: 0.77 mg/dL  CrCl: 72.2 mL/min  Renal replacement: Not on dialysis  Days of Therapy: 6  Current Dose: 1500 q24  Vancomycin Plan:  New Dosin q12  Estimated AUC: 456 mcg*hr/mL  Estimated Trough: 12.9 mcg/mL  Next Level:  6am  Renal Function Monitoring: Daily BMP and UOP  Pharmacy will continue to follow closely for s/sx of nephrotoxicity, infusion reactions and appropriateness of therapy.  BMP and CBC will be ordered per protocol. We will continue to follow the patient’s culture results and clinical progress daily.    Laith Higgins, Pharmacist

## 2025-06-23 ENCOUNTER — ANESTHESIA (INPATIENT)
Dept: PERIOP | Facility: HOSPITAL | Age: 80
End: 2025-06-23
Payer: COMMERCIAL

## 2025-06-23 ENCOUNTER — APPOINTMENT (INPATIENT)
Dept: RADIOLOGY | Facility: HOSPITAL | Age: 80
DRG: 853 | End: 2025-06-23
Payer: COMMERCIAL

## 2025-06-23 LAB
ABO GROUP BLD BPU: NORMAL
ANION GAP SERPL CALCULATED.3IONS-SCNC: 6 MMOL/L (ref 4–13)
APTT PPP: 25 SECONDS (ref 23–34)
APTT PPP: 25 SECONDS (ref 23–34)
BASOPHILS # BLD AUTO: 0.03 THOUSANDS/ÂΜL (ref 0–0.1)
BASOPHILS NFR BLD AUTO: 0 % (ref 0–1)
BPU ID: NORMAL
BUN SERPL-MCNC: 7 MG/DL (ref 5–25)
CALCIUM SERPL-MCNC: 8.8 MG/DL (ref 8.4–10.2)
CHLORIDE SERPL-SCNC: 106 MMOL/L (ref 96–108)
CO2 SERPL-SCNC: 25 MMOL/L (ref 21–32)
CREAT SERPL-MCNC: 0.81 MG/DL (ref 0.6–1.3)
CROSSMATCH: NORMAL
EOSINOPHIL # BLD AUTO: 0.17 THOUSAND/ÂΜL (ref 0–0.61)
EOSINOPHIL NFR BLD AUTO: 2 % (ref 0–6)
ERYTHROCYTE [DISTWIDTH] IN BLOOD BY AUTOMATED COUNT: 14.8 % (ref 11.6–15.1)
GFR SERPL CREATININE-BSD FRML MDRD: 84 ML/MIN/1.73SQ M
GLUCOSE SERPL-MCNC: 101 MG/DL (ref 65–140)
GLUCOSE SERPL-MCNC: 101 MG/DL (ref 65–140)
GLUCOSE SERPL-MCNC: 104 MG/DL (ref 65–140)
GLUCOSE SERPL-MCNC: 105 MG/DL (ref 65–140)
GLUCOSE SERPL-MCNC: 106 MG/DL (ref 65–140)
GLUCOSE SERPL-MCNC: 107 MG/DL (ref 65–140)
GLUCOSE SERPL-MCNC: 109 MG/DL (ref 65–140)
GLUCOSE SERPL-MCNC: 111 MG/DL (ref 65–140)
GLUCOSE SERPL-MCNC: 118 MG/DL (ref 65–140)
GLUCOSE SERPL-MCNC: 119 MG/DL (ref 65–140)
GLUCOSE SERPL-MCNC: 160 MG/DL (ref 65–140)
GLUCOSE SERPL-MCNC: 168 MG/DL (ref 65–140)
HCT VFR BLD AUTO: 31 % (ref 36.5–49.3)
HGB BLD-MCNC: 9.8 G/DL (ref 12–17)
IMM GRANULOCYTES # BLD AUTO: 0.05 THOUSAND/UL (ref 0–0.2)
IMM GRANULOCYTES NFR BLD AUTO: 1 % (ref 0–2)
LYMPHOCYTES # BLD AUTO: 1.34 THOUSANDS/ÂΜL (ref 0.6–4.47)
LYMPHOCYTES NFR BLD AUTO: 16 % (ref 14–44)
MCH RBC QN AUTO: 27.7 PG (ref 26.8–34.3)
MCHC RBC AUTO-ENTMCNC: 31.6 G/DL (ref 31.4–37.4)
MCV RBC AUTO: 88 FL (ref 82–98)
MONOCYTES # BLD AUTO: 0.68 THOUSAND/ÂΜL (ref 0.17–1.22)
MONOCYTES NFR BLD AUTO: 8 % (ref 4–12)
NEUTROPHILS # BLD AUTO: 6.12 THOUSANDS/ÂΜL (ref 1.85–7.62)
NEUTS SEG NFR BLD AUTO: 73 % (ref 43–75)
NRBC BLD AUTO-RTO: 0 /100 WBCS
PLATELET # BLD AUTO: 289 THOUSANDS/UL (ref 149–390)
PMV BLD AUTO: 9.5 FL (ref 8.9–12.7)
POTASSIUM SERPL-SCNC: 3.8 MMOL/L (ref 3.5–5.3)
RBC # BLD AUTO: 3.54 MILLION/UL (ref 3.88–5.62)
SODIUM SERPL-SCNC: 137 MMOL/L (ref 135–147)
UNIT DISPENSE STATUS: NORMAL
UNIT PRODUCT CODE: NORMAL
UNIT PRODUCT VOLUME: 350 ML
UNIT RH: NORMAL
VANCOMYCIN TROUGH SERPL-MCNC: 14.9 UG/ML (ref 10–20)
WBC # BLD AUTO: 8.39 THOUSAND/UL (ref 4.31–10.16)

## 2025-06-23 PROCEDURE — 85730 THROMBOPLASTIN TIME PARTIAL: CPT | Performed by: PHYSICIAN ASSISTANT

## 2025-06-23 PROCEDURE — 87070 CULTURE OTHR SPECIMN AEROBIC: CPT | Performed by: PODIATRIST

## 2025-06-23 PROCEDURE — 80202 ASSAY OF VANCOMYCIN: CPT | Performed by: INTERNAL MEDICINE

## 2025-06-23 PROCEDURE — 0Y6M0ZB DETACHMENT AT RIGHT FOOT, PARTIAL 2ND RAY, OPEN APPROACH: ICD-10-PCS | Performed by: PODIATRIST

## 2025-06-23 PROCEDURE — 99232 SBSQ HOSP IP/OBS MODERATE 35: CPT | Performed by: NURSE PRACTITIONER

## 2025-06-23 PROCEDURE — 0Y6M0ZD DETACHMENT AT RIGHT FOOT, PARTIAL 4TH RAY, OPEN APPROACH: ICD-10-PCS | Performed by: PODIATRIST

## 2025-06-23 PROCEDURE — 88305 TISSUE EXAM BY PATHOLOGIST: CPT | Performed by: STUDENT IN AN ORGANIZED HEALTH CARE EDUCATION/TRAINING PROGRAM

## 2025-06-23 PROCEDURE — 80048 BASIC METABOLIC PNL TOTAL CA: CPT | Performed by: NURSE PRACTITIONER

## 2025-06-23 PROCEDURE — 73630 X-RAY EXAM OF FOOT: CPT

## 2025-06-23 PROCEDURE — 82948 REAGENT STRIP/BLOOD GLUCOSE: CPT

## 2025-06-23 PROCEDURE — 28805 AMPUTATION THRU METATARSAL: CPT | Performed by: PODIATRIST

## 2025-06-23 PROCEDURE — 0Y6M0Z9 DETACHMENT AT RIGHT FOOT, PARTIAL 1ST RAY, OPEN APPROACH: ICD-10-PCS | Performed by: PODIATRIST

## 2025-06-23 PROCEDURE — 85730 THROMBOPLASTIN TIME PARTIAL: CPT | Performed by: NURSE PRACTITIONER

## 2025-06-23 PROCEDURE — 87205 SMEAR GRAM STAIN: CPT | Performed by: PODIATRIST

## 2025-06-23 PROCEDURE — 94760 N-INVAS EAR/PLS OXIMETRY 1: CPT

## 2025-06-23 PROCEDURE — 94664 DEMO&/EVAL PT USE INHALER: CPT

## 2025-06-23 PROCEDURE — 87075 CULTR BACTERIA EXCEPT BLOOD: CPT | Performed by: PODIATRIST

## 2025-06-23 PROCEDURE — 87077 CULTURE AEROBIC IDENTIFY: CPT | Performed by: PODIATRIST

## 2025-06-23 PROCEDURE — 87186 SC STD MICRODIL/AGAR DIL: CPT | Performed by: PODIATRIST

## 2025-06-23 PROCEDURE — 0Y6M0ZF DETACHMENT AT RIGHT FOOT, PARTIAL 5TH RAY, OPEN APPROACH: ICD-10-PCS | Performed by: PODIATRIST

## 2025-06-23 PROCEDURE — 85025 COMPLETE CBC W/AUTO DIFF WBC: CPT | Performed by: NURSE PRACTITIONER

## 2025-06-23 PROCEDURE — 87147 CULTURE TYPE IMMUNOLOGIC: CPT | Performed by: PODIATRIST

## 2025-06-23 PROCEDURE — 88311 DECALCIFY TISSUE: CPT | Performed by: STUDENT IN AN ORGANIZED HEALTH CARE EDUCATION/TRAINING PROGRAM

## 2025-06-23 PROCEDURE — 0Y6M0ZC DETACHMENT AT RIGHT FOOT, PARTIAL 3RD RAY, OPEN APPROACH: ICD-10-PCS | Performed by: PODIATRIST

## 2025-06-23 RX ORDER — FENTANYL CITRATE 50 UG/ML
INJECTION, SOLUTION INTRAMUSCULAR; INTRAVENOUS AS NEEDED
Status: DISCONTINUED | OUTPATIENT
Start: 2025-06-23 | End: 2025-06-23

## 2025-06-23 RX ORDER — OXYCODONE HYDROCHLORIDE 5 MG/1
5 TABLET ORAL EVERY 4 HOURS PRN
Refills: 0 | Status: DISCONTINUED | OUTPATIENT
Start: 2025-06-23 | End: 2025-07-05 | Stop reason: HOSPADM

## 2025-06-23 RX ORDER — ONDANSETRON 2 MG/ML
4 INJECTION INTRAMUSCULAR; INTRAVENOUS ONCE AS NEEDED
Status: DISCONTINUED | OUTPATIENT
Start: 2025-06-23 | End: 2025-06-23 | Stop reason: HOSPADM

## 2025-06-23 RX ORDER — HEPARIN SODIUM 10000 [USP'U]/100ML
3-20 INJECTION, SOLUTION INTRAVENOUS
Status: DISCONTINUED | OUTPATIENT
Start: 2025-06-23 | End: 2025-06-24

## 2025-06-23 RX ORDER — MICONAZOLE NITRATE 20 MG/G
CREAM TOPICAL 2 TIMES DAILY
Status: DISCONTINUED | OUTPATIENT
Start: 2025-06-23 | End: 2025-07-05 | Stop reason: HOSPADM

## 2025-06-23 RX ORDER — HEPARIN SODIUM 1000 [USP'U]/ML
3600 INJECTION, SOLUTION INTRAVENOUS; SUBCUTANEOUS ONCE
Status: COMPLETED | OUTPATIENT
Start: 2025-06-23 | End: 2025-06-23

## 2025-06-23 RX ORDER — FENTANYL CITRATE/PF 50 MCG/ML
25 SYRINGE (ML) INJECTION
Status: DISCONTINUED | OUTPATIENT
Start: 2025-06-23 | End: 2025-06-23 | Stop reason: HOSPADM

## 2025-06-23 RX ORDER — HYDROMORPHONE HCL IN WATER/PF 6 MG/30 ML
0.2 PATIENT CONTROLLED ANALGESIA SYRINGE INTRAVENOUS EVERY 2 HOUR PRN
Status: DISCONTINUED | OUTPATIENT
Start: 2025-06-23 | End: 2025-07-05 | Stop reason: HOSPADM

## 2025-06-23 RX ORDER — DIPHENHYDRAMINE HYDROCHLORIDE 50 MG/ML
25 INJECTION, SOLUTION INTRAMUSCULAR; INTRAVENOUS EVERY 6 HOURS PRN
Status: DISCONTINUED | OUTPATIENT
Start: 2025-06-23 | End: 2025-07-05 | Stop reason: HOSPADM

## 2025-06-23 RX ORDER — LIDOCAINE HYDROCHLORIDE 10 MG/ML
INJECTION, SOLUTION EPIDURAL; INFILTRATION; INTRACAUDAL; PERINEURAL AS NEEDED
Status: DISCONTINUED | OUTPATIENT
Start: 2025-06-23 | End: 2025-06-23

## 2025-06-23 RX ORDER — HEPARIN SODIUM 1000 [USP'U]/ML
1800 INJECTION, SOLUTION INTRAVENOUS; SUBCUTANEOUS EVERY 6 HOURS PRN
Status: DISCONTINUED | OUTPATIENT
Start: 2025-06-23 | End: 2025-07-05 | Stop reason: HOSPADM

## 2025-06-23 RX ORDER — ONDANSETRON 2 MG/ML
INJECTION INTRAMUSCULAR; INTRAVENOUS AS NEEDED
Status: DISCONTINUED | OUTPATIENT
Start: 2025-06-23 | End: 2025-06-23

## 2025-06-23 RX ORDER — MAGNESIUM HYDROXIDE 1200 MG/15ML
LIQUID ORAL AS NEEDED
Status: DISCONTINUED | OUTPATIENT
Start: 2025-06-23 | End: 2025-06-23 | Stop reason: HOSPADM

## 2025-06-23 RX ORDER — SODIUM CHLORIDE, SODIUM LACTATE, POTASSIUM CHLORIDE, CALCIUM CHLORIDE 600; 310; 30; 20 MG/100ML; MG/100ML; MG/100ML; MG/100ML
INJECTION, SOLUTION INTRAVENOUS CONTINUOUS PRN
Status: DISCONTINUED | OUTPATIENT
Start: 2025-06-23 | End: 2025-06-23

## 2025-06-23 RX ORDER — HYDROMORPHONE HCL/PF 1 MG/ML
0.5 SYRINGE (ML) INJECTION
Status: DISCONTINUED | OUTPATIENT
Start: 2025-06-23 | End: 2025-06-23 | Stop reason: HOSPADM

## 2025-06-23 RX ORDER — PROPOFOL 10 MG/ML
INJECTION, EMULSION INTRAVENOUS AS NEEDED
Status: DISCONTINUED | OUTPATIENT
Start: 2025-06-23 | End: 2025-06-23

## 2025-06-23 RX ORDER — ALBUTEROL SULFATE 0.83 MG/ML
2.5 SOLUTION RESPIRATORY (INHALATION) ONCE AS NEEDED
Status: DISCONTINUED | OUTPATIENT
Start: 2025-06-23 | End: 2025-06-23 | Stop reason: HOSPADM

## 2025-06-23 RX ORDER — LIDOCAINE HYDROCHLORIDE 10 MG/ML
INJECTION, SOLUTION EPIDURAL; INFILTRATION; INTRACAUDAL; PERINEURAL AS NEEDED
Status: DISCONTINUED | OUTPATIENT
Start: 2025-06-23 | End: 2025-06-23 | Stop reason: HOSPADM

## 2025-06-23 RX ORDER — HEPARIN SODIUM 1000 [USP'U]/ML
3600 INJECTION, SOLUTION INTRAVENOUS; SUBCUTANEOUS EVERY 6 HOURS PRN
Status: DISCONTINUED | OUTPATIENT
Start: 2025-06-23 | End: 2025-07-05 | Stop reason: HOSPADM

## 2025-06-23 RX ADMIN — Medication 4.5 G: at 02:05

## 2025-06-23 RX ADMIN — ONDANSETRON 4 MG: 2 INJECTION INTRAMUSCULAR; INTRAVENOUS at 14:26

## 2025-06-23 RX ADMIN — HYDROMORPHONE HYDROCHLORIDE 0.2 MG: 0.2 INJECTION, SOLUTION INTRAMUSCULAR; INTRAVENOUS; SUBCUTANEOUS at 20:12

## 2025-06-23 RX ADMIN — VANCOMYCIN HYDROCHLORIDE 750 MG: 1 INJECTION, POWDER, LYOPHILIZED, FOR SOLUTION INTRAVENOUS at 08:33

## 2025-06-23 RX ADMIN — VANCOMYCIN HYDROCHLORIDE 750 MG: 1 INJECTION, POWDER, LYOPHILIZED, FOR SOLUTION INTRAVENOUS at 19:22

## 2025-06-23 RX ADMIN — PREDNISONE 5 MG: 5 TABLET ORAL at 17:16

## 2025-06-23 RX ADMIN — SODIUM CHLORIDE, SODIUM LACTATE, POTASSIUM CHLORIDE, AND CALCIUM CHLORIDE: .6; .31; .03; .02 INJECTION, SOLUTION INTRAVENOUS at 14:21

## 2025-06-23 RX ADMIN — PROPOFOL 20 MG: 10 INJECTION, EMULSION INTRAVENOUS at 15:29

## 2025-06-23 RX ADMIN — Medication 4.5 G: at 17:43

## 2025-06-23 RX ADMIN — BUDESONIDE, GLYCOPYRROLATE, AND FORMOTEROL FUMARATE 2 PUFF: 160; 9; 4.8 AEROSOL, METERED RESPIRATORY (INHALATION) at 08:36

## 2025-06-23 RX ADMIN — FENTANYL CITRATE 25 MCG: 50 INJECTION INTRAMUSCULAR; INTRAVENOUS at 14:33

## 2025-06-23 RX ADMIN — SENNOSIDES AND DOCUSATE SODIUM 2 TABLET: 50; 8.6 TABLET ORAL at 21:43

## 2025-06-23 RX ADMIN — HEPARIN SODIUM 12 UNITS/KG/HR: 10000 INJECTION, SOLUTION INTRAVENOUS at 17:27

## 2025-06-23 RX ADMIN — Medication 8 MCG: at 14:18

## 2025-06-23 RX ADMIN — PROPOFOL 70 MCG/KG/MIN: 10 INJECTION, EMULSION INTRAVENOUS at 14:27

## 2025-06-23 RX ADMIN — PROPOFOL 50 MG: 10 INJECTION, EMULSION INTRAVENOUS at 14:26

## 2025-06-23 RX ADMIN — BUDESONIDE, GLYCOPYRROLATE, AND FORMOTEROL FUMARATE 2 PUFF: 160; 9; 4.8 AEROSOL, METERED RESPIRATORY (INHALATION) at 21:43

## 2025-06-23 RX ADMIN — FENTANYL CITRATE 25 MCG: 50 INJECTION INTRAMUSCULAR; INTRAVENOUS at 14:43

## 2025-06-23 RX ADMIN — Medication 4.5 G: at 09:52

## 2025-06-23 RX ADMIN — Medication 4 MCG: at 14:26

## 2025-06-23 RX ADMIN — ATORVASTATIN CALCIUM 40 MG: 40 TABLET, FILM COATED ORAL at 17:16

## 2025-06-23 RX ADMIN — LIDOCAINE HYDROCHLORIDE 50 MG: 10 INJECTION, SOLUTION EPIDURAL; INFILTRATION; INTRACAUDAL at 14:26

## 2025-06-23 RX ADMIN — HEPARIN SODIUM 3600 UNITS: 1000 INJECTION, SOLUTION INTRAVENOUS; SUBCUTANEOUS at 17:37

## 2025-06-23 RX ADMIN — OXYCODONE HYDROCHLORIDE 5 MG: 5 TABLET ORAL at 17:16

## 2025-06-23 RX ADMIN — MICONAZOLE NITRATE: 20 CREAM TOPICAL at 17:17

## 2025-06-23 RX ADMIN — MONTELUKAST 10 MG: 10 TABLET, FILM COATED ORAL at 21:43

## 2025-06-23 NOTE — ASSESSMENT & PLAN NOTE
Lab Results   Component Value Date    EGFR 84 06/23/2025    EGFR 86 06/22/2025    EGFR 84 06/21/2025    CREATININE 0.81 06/23/2025    CREATININE 0.77 06/22/2025    CREATININE 0.81 06/21/2025     POA with creatinine 1.49  Creatinine is now around baseline of 0.9-1.0  Likely prerenal in setting of shock state  Daily metabolic panel and trend creatinine

## 2025-06-23 NOTE — ASSESSMENT & PLAN NOTE
Lab Results   Component Value Date    HGBA1C 6.6 (H) 06/18/2025       Recent Labs     06/22/25  2355 06/23/25  0200 06/23/25  0408 06/23/25  0616   POCGLU 114 106 109 104       Blood Sugar Average: Last 72 hrs:  (P) 148.075    Glucose controlled  Continue insulin infusion for now   Home glipizide and metformin on hold

## 2025-06-23 NOTE — PLAN OF CARE
Problem: Nutrition/Hydration-ADULT  Goal: Nutrient/Hydration intake appropriate for improving, restoring or maintaining nutritional needs  Description: Monitor and assess patient's nutrition/hydration status for malnutrition. Collaborate with interdisciplinary team and initiate plan and interventions as ordered.  Monitor patient's weight and dietary intake as ordered or per policy. Utilize nutrition screening tool and intervene as necessary. Determine patient's food preferences and provide high-protein, high-caloric foods as appropriate.     INTERVENTIONS:  - Monitor oral intake, urinary output, labs, and treatment plans  - Assess nutrition and hydration status and recommend course of action  - Evaluate amount of meals eaten  - Assist patient with eating if necessary   - Allow adequate time for meals  - Recommend/ encourage appropriate diets, oral nutritional supplements, and vitamin/mineral supplements  - Order, calculate, and assess calorie counts as needed  - Recommend, monitor, and adjust tube feedings and TPN/PPN based on assessed needs  - Assess need for intravenous fluids  - Provide specific nutrition/hydration education as appropriate  - Include patient/family/caregiver in decisions related to nutrition  Outcome: Progressing     Problem: CARDIOVASCULAR - ADULT  Goal: Maintains optimal cardiac output and hemodynamic stability  Description: INTERVENTIONS:  - Monitor I/O, vital signs and rhythm  - Monitor for S/S and trends of decreased cardiac output  - Administer and titrate ordered vasoactive medications to optimize hemodynamic stability  - Assess quality of pulses, skin color and temperature  - Assess for signs of decreased coronary artery perfusion  - Instruct patient to report change in severity of symptoms  Outcome: Progressing     Problem: Prexisting or High Potential for Compromised Skin Integrity  Goal: Skin integrity is maintained or improved  Description: INTERVENTIONS:  - Identify patients at  risk for skin breakdown  - Assess and monitor skin integrity including under and around medical devices   - Assess and monitor nutrition and hydration status  - Monitor labs  - Assess for incontinence   - Turn and reposition patient  - Assist with mobility/ambulation  - Relieve pressure over joycelyn prominences   - Avoid friction and shearing  - Provide appropriate hygiene as needed including keeping skin clean and dry  - Evaluate need for skin moisturizer/barrier cream  - Collaborate with interdisciplinary team  - Patient/family teaching  - Consider wound care consult    Assess:  - Review Sae scale daily  - Clean and moisturize skin every shift  - Inspect skin when repositioning, toileting, and assisting with ADLS  - Assess under medical devices   - Assess extremities for adequate circulation and sensation     Bed Management:  - Have minimal linens on bed & keep smooth, unwrinkled  - Change linens as needed when moist or perspiring  - Avoid sitting or lying in one position for more than 2 hours while in bed?Keep HOB at 30 degrees   - Toileting:  - Offer bedside commode  - Assess for incontinence   - Use incontinent care products after each incontinent episode     Activity:  - Mobilize patient 3 times a day  - Encourage activity and walks on unit  - Encourage or provide ROM exercises   - Turn and reposition patient every 2 Hours  - Use appropriate equipment to lift or move patient in bed  - Instruct/ Assist with weight shifting every 60 when out of bed in chair  - Consider limitation of chair time 2 hour intervals    Skin Care:  - Avoid use of baby powder, tape, friction and shearing, hot water or constrictive clothing  - Relieve pressure over bony prominences using mepelex  - Do not massage red bony areas    Next Steps:  - Teach patient strategies to minimize risks  - Consider consults to  interdisciplinary teams   Outcome: Not Progressing

## 2025-06-23 NOTE — OP NOTE
OPERATIVE REPORT - Podiatry  PATIENT NAME: Gold Van    :  1945  MRN: [id number]  Pt Location: [facility] OR ROOM 03    SURGERY DATE: 2025    Surgeons and Role:     * Agustin Galeas DPM - Primary     * Benny Zapata DPM    Pre-op Diagnosis:  Diabetic ulcer of right midfoot (HCC) [E11.621, L97.419]    Post-Op Diagnosis Codes:     * Diabetic ulcer of right midfoot (HCC) [E11.621, L97.419]    Procedure(s) (LRB):  AMPUTATION TRANSMETATARSAL (TMA) (Right)    Specimen(s):  ID Type Source Tests Collected by Time Destination   1 : RIGHT FOREFOOT Tissue Foot, Right TISSUE EXAM Agustin Galeas DPM 2025 1447    A : RIGHT 4TH METATARSAL CULTURE Wound Wound ANAEROBIC CULTURE AND GRAM STAIN, WOUND CULTURE Agustin Galeas DPM 2025 1454    B : RIGHT 5TH METATARSAL CULTURE Wound Wound ANAEROBIC CULTURE AND GRAM STAIN, WOUND CULTURE Agustin Galeas DPM 2025 1454        Estimated Blood Loss:   25 mL    Drains:  * No LDAs found *    Anesthesia Type:   Conscious Sedation  with 15 ml of 1% Lidocaine     Hemostasis:  Electrocauterization, direct compression    Materials:  * No implants in log *  Nylon suture    Operative Findings:  Right foot transmetatarsal amputation achieved and the surgical site skin was closed with nylon suture in minimum tension.  The infected bone, soft tissue and skin fully removed and sent for tissue examination. The 4th and 5th metatarsal proximal margin was sent for aerobic and anaerobic culture.  The remaining bone and soft tissue show no signs of devitalization or purulence/sinus track.  Healthy bleeding was found in the surgery. Bleeding was controlled with direct compression and electrocauterization.  Adaptic DSD with ACE bandage on no compression was applied. Patient will be non-weight bearing to the right foot.       Complications:   None    Procedure and Technique:     Under mild sedation, the patient was brought into the operating room  and placed in his stretcher in the supine position. IV sedation was achieved by anesthesia team and a universal timeout was performed where all parties are in agreement of correct patient, correct procedure and correct site. A ankle block was performed consisting of 15 ml of 1% Lidocaine. The foot was then prepped and draped in the usual aseptic manner.     Attention was directed to the right foot and the wound on the lateral foot was appreciated. A fishmouth transmetatarsal amputation incision plan was performed with a 10 blade deep to bone and the dorsal soft tissue to the metatarsals were released with a Key elevator. Then Sagittal saw was used to resect the metatarsals with attention to keep parabola and dorsal roof. The plantar soft tissue was further released from the distal metatarsals and the right forefoot was removed from the rest of the foot. The forefoot was then passed to the back table for tissue exam. Then a trial of closure was performed and the plantar flap shows too much tension for approximation to the dorsal flap. Decision was made to further resect a few millimeters of all the metatarsals with attention to keep parabola and dorsal roof. Then the further resected 4th and 5th metatarsals were sent for aerobic and anaerobic wound culture. At this point there is enough skin for full closure of the surgical site with minimum tension. FHL tendon was resected to the most achievably proximal aspect. Plantar flap ligaments and plantar plates were further resected. At this point all remaining soft tissue show no purulence, abscess or sinus track. The remaining bones were hard, white and bleeding. The surgical site was irrigated with copious amount of sterile saline and further closed with Nylon suture. Adaptic DSD dressing was applied with ACE bandage on no compression.     Healthy bleeding was found in the surgery. Bleeding was controlled with electrocauterization and compression.     The patient  "tolerated the procedure and anesthesia well without immediate complications and transferred to PACU with vital signs stable.     As with many limb salvage procedures, we contemplate the possibility of performing further stages to this procedure. Procedures may include debridements, delayed closure, plastic surgery techniques, or more proximal amputations. This procedure may be considered part of a multi-staged limb salvage treatment plan.     Dr. Galeas was present during the entire procedure and participated in all key aspects.    SIGNATURE: Benny Zapata DPM  DATE: June 23, 2025  TIME: 3:57 PM      Portions of the record may have been created with voice recognition software. Occasional wrong word or \"sound a like\" substitutions may have occurred due to the inherent limitations of voice recognition software. Read the chart carefully and recognize, using context, where substitutions have occurred.   "

## 2025-06-23 NOTE — ASSESSMENT & PLAN NOTE
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 No definite MRI evidence of osteomyelitis. The ulcer is also in close approximation to the distal third metatarsal, and early osteomyelitis in that location cannot be excluded. No evidence of an abscess  Zosyn/Vanco D7  Plan for OR today

## 2025-06-23 NOTE — PROGRESS NOTES
1915: Infusion verification completed. Pt a+ox4, follows commands. Denies pain. Pt c/o being tired tonight. Pt bathed, linens changed & repositioned for comfort.     2100: New #20G heplock inserted into Left medial AC; good blood return & flushes easily. LFA heplock removed due to occlusion.     2359: Heparin gtt stopped. Pt NPO for OR today 6/23/25.     0600: Pt slept comfortably throughout the night, except to wake up to void. Titrating insulin gtt as needed per protocol.

## 2025-06-23 NOTE — ASSESSMENT & PLAN NOTE
Malnutrition Findings:        Nutrition following                       BMI Findings:           Body mass index is 19.23 kg/m².

## 2025-06-23 NOTE — PROGRESS NOTES
Gold Van is a 79 y.o. male who is currently ordered Vancomycin IV with management by the Pharmacy Consult service.  Relevant clinical data and objective / subjective history reviewed.  Vancomycin Assessment:  Indication and Goal AUC/Trough: Bone/joint infection (goal -600, trough >10), -600, trough >10  Clinical Status: stable  Micro:     Renal Function:  SCr: 0.81 mg/dL  CrCl: 66.5 mL/min  Renal replacement: Not on dialysis  Days of Therapy: 7  Current Dose: 750mg IV q12h  Vancomycin Plan:  New Dosinmg IV q12h  Estimated AUC: 494 mcg*hr/mL  Estimated Trough: 14.3 mcg/mL  Next Level: 6/30 AM Labs  Renal Function Monitoring: Daily BMP and UOP  Pharmacy will continue to follow closely for s/sx of nephrotoxicity, infusion reactions and appropriateness of therapy.  BMP and CBC will be ordered per protocol. We will continue to follow the patient’s culture results and clinical progress daily.    Maged Davila, Pharmacist

## 2025-06-23 NOTE — ANESTHESIA POSTPROCEDURE EVALUATION
Post-Op Assessment Note    CV Status:  Stable    Pain management: adequate       Mental Status:  Alert and awake   Hydration Status:  Euvolemic   PONV Controlled:  Controlled   Airway Patency:  Patent     Post Op Vitals Reviewed: Yes    No anethesia notable event occurred.    Staff: CRNA           Last Filed PACU Vitals:  Vitals Value Taken Time   Temp 97.7    Pulse 90    /75    Resp 22    SpO2 95

## 2025-06-23 NOTE — OCCUPATIONAL THERAPY NOTE
Occupational Therapy Cancellation Note     06/23/25 0742   OT Last Visit   OT Visit Date 06/23/25   Note Type   Note type Cancelled Session   Cancel Reasons Patient to operating room   Additional Comments planned OR today     OT orders received. Chart reviewed. Will follow-up as able and appropriate    Marcella Redd OTR/L

## 2025-06-23 NOTE — ANESTHESIA PREPROCEDURE EVALUATION
Procedure:  AMPUTATION TRANSMETATARSAL (TMA) (Right: Foot)    80 yo M admitted 6/17 to ICU with septic shock, transferred to floor 6/18. Did plan to go to OR last Friday 6/20 however was actively on heparin gtt. Plan to come back to OR today with gtt on hold 6-8 hours prior. Hbg 6/23 9.8    ECG: Afib with RVR  TTE 2025: LVEF 50% with G1DD, RV wnl, AV sclerosis   Stress 2025: No evidence of ischemia     Hx of COPD with MUÑOZ, no SOB at rest and no active respiratory issues today  Relevant Problems   CARDIO   (+) 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   (+) 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 ([facility])   (+) Essential hypertension   (+) Mixed hyperlipidemia   (+) PAF (paroxysmal atrial fibrillation) ([facility])   (+) Pulmonary hypertension (HCC)   (+) Severe peripheral arterial disease (HCC)      ENDO   (+) Type 2 diabetes mellitus with complication, without long-term current use of insulin (HCC)      GI/HEPATIC   (+) Gastroesophageal reflux disease without esophagitis      /RENAL   (+) Acute renal failure superimposed on stage 2 chronic kidney disease  (HCC)      HEMATOLOGY   (+) Anemia      NEURO/PSYCH   (+) CVA (cerebrovascular accident) (HCC)      PULMONARY   (+) 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[facility])   (+) Mild intermittent asthma without complication      Other   (+) 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 ([facility])   (+) Osteomyelitis ([facility])        Physical Exam    Airway     Mallampati score: II  TM Distance: >3 FB  Neck ROM: full  Mouth opening: >= 4 cm      Cardiovascular      Dental        Pulmonary      Neurological      Other Findings      Anesthesia Plan  ASA Score- 3     Anesthesia Type- IV sedation with anesthesia with ASA Monitors.         Additional Monitors:     Airway Plan:            Plan Factors-Exercise tolerance (METS): >4 METS.    Chart reviewed. EKG reviewed. Imaging results reviewed. Existing labs reviewed. Patient summary reviewed.           Obstructive sleep apnea risk education given perioperatively.        Induction-     Postoperative Plan- .   Monitoring Plan - Monitoring plan - standard ASA monitoring      Perioperative Resuscitation Plan - Level 1 - Full Code.       Informed Consent- Anesthetic plan and risks discussed with patient.  I personally reviewed this patient with the CRNA. Discussed and agreed on the Anesthesia Plan with the CRNA..      NPO Status:  Vitals Value Taken Time   Date of last liquid 06/19/25 06/20/25 13:10   Time of last liquid 2200 06/20/25 13:10   Date of last solid 06/19/25 06/20/25 13:10   Time of last solid 1800 06/20/25 13:10

## 2025-06-23 NOTE — ANESTHESIA POSTPROCEDURE EVALUATION
Post-Op Assessment Note    CV Status:  Stable    Pain management: adequate       Mental Status:  Alert and awake   Hydration Status:  Euvolemic   PONV Controlled:  Controlled   Airway Patency:  Patent     Post Op Vitals Reviewed: Yes    No anethesia notable event occurred.    Staff: Anesthesiologist, with CRNAs           Last Filed PACU Vitals:  Vitals Value Taken Time   Temp 97.7 °F (36.5 °C) 06/23/25 15:45   Pulse 82 06/23/25 15:59   /71 06/23/25 15:55   Resp 16 06/23/25 15:59   SpO2 96 % 06/23/25 15:59   Vitals shown include unfiled device data.    Modified Casi:     Vitals Value Taken Time   Activity 2 06/23/25 15:45   Respiration 2 06/23/25 15:45   Circulation 2 06/23/25 15:45   Consciousness 1 06/23/25 15:45   Oxygen Saturation 2 06/23/25 15:45     Modified Casi Score: 9

## 2025-06-23 NOTE — ASSESSMENT & PLAN NOTE
Home atenolol on hold due to shock state, resume when appropriate   Home apixaban on hold, heparin infusion pending surgical intervention  Had episodes of atrial fibrillation with RVR 6/21 treated with 3 doses of metoprolol IV followed by Cardizem.  No further episodes  Heparin on hold for OR

## 2025-06-23 NOTE — PHYSICAL THERAPY NOTE
Physical Therapy Cancellation Note       06/23/25 0747   PT Last Visit   PT Visit Date 06/23/25   Note Type   Note type Cancelled Session   Cancel Reasons Patient to operating room     Leeanna Henao

## 2025-06-23 NOTE — WOUND OSTOMY CARE
Progress Note - Wound   Gold Van 79 y.o. male MRN: [id number]  Unit/Bed#: ICU 02-01 Encounter: [id number]      Assessment Findings:   Wound care weekly follow up for patient admitted from NH with septic shock. Patient is to be going to the OR today for TMA of right foot. Patient is on a P-500 low air loss therapy bed, he is awake, alert and oriented. He has nutritional supplements, is not incontinent, and is an assist to turn and reposition. Patient has a dressing to the right foot, podiatry managing care of the right foot.     1- POA- bilateral buttock evolving deep tissue injury. Wounds on buttocks imaged on admission, purple tissue noted on right buttock of purple intact tissue and evolving area with beefy red wound bed, periwound erythema. Left buttock image of intact purple tissue with periwound erythema. Wound beds are now presenting as unstageable pressure injuries. Left upper buttock at the sacral region- 100% yellow slough to the wound bed, periwound skin is beefy red and blanchable. Small amount of yellow drainage on dressing when removed. Right upper buttock wound- 100% yellow slough to the wound bed, periwound skin pink and blanchable. Small to moderate amount of yellow drainage on dressing when removed.   2- Right lower leg skin tears - 3 separate wounds, partial thickness, beefy red with scant bloody drainage. Per patient he has fragile skin, he has frequent skin tears. Skin tear to the right inner knee and right mid anterior leg with approx 50% dusky skin flap adhered. Right lower knee wound - mix of yellow and beefy red tissue to the wound bed, scant serosanguineous drainage on dressing when removed.   3- Left mid anterior leg skin tear- wound bed pale yellow and beefy red mix, partial thickness, scant bloody drainage, periwound intact.  4- MASD/ITD with fungal component to the scrotum, b/l groin and inner thighs, penis and perineum to the perianal region. Pink satellite lesions noted. Musa  order requested from provider.     Skin and Wound Care Plan:   1- Bilateral pretibial wounds: cleanse with VASHE, apply non-adherent oil emulsion dressings on the wound beds then top with Dermagran, cover with 4X4 and wrap with becki, change daily and PRN  2- Apply Triad paste to wounds to the right and left upper buttocks/sacral region, cover with Mepilex bordered foam dressing, ramone with T, date, change daily and PRN  3- Offloading green wedges  4- MASD/ITD groin, scrotum, upper thighs and perianal skin: Cleanse with soap and water, pat dry. Apply Musa cream to  2x/day and as needed if soiled.  5- Elevate heels with pillows to offload pressure  6- Ehob offloading cushion when OOB to the chair  7- Place patient on ATR turning and repositioning system.  8- Moisturize skin daily with skin nourishing cream  9- Apply silicone bordered foam dressings (Mepilex) to b/l Heels. Ramone with P for Prevention and change every 3 days or PRN. Peel back and inspect Q-shift.    Wound:  Wound 05/13/25 Surgical Closed Surgical Incision Thigh Anterior;Proximal;Right (Active)   Wound Description Intact 06/23/25 1233   Miranda-wound Assessment Clean;Dry;Intact 06/23/25 0400   Treatments Cleansed 06/22/25 2000   Dressing Open to air 06/23/25 1645       Wound 06/17/25 Pressure Injury Buttocks Mid;Right (Active)   Wound Image   06/23/25 1231   Wound Description Yellow;Slough 06/23/25 1231   Non-staged Wound Description Partial thickness 06/20/25 1200   Wound Length (cm) 2.7 cm 06/23/25 1231   Wound Width (cm) 1.5 cm 06/23/25 1231   Wound Depth (cm) 0.2 cm 06/18/25 1026   Wound Surface Area (cm^2) 3.18 cm^2 06/23/25 1231   Wound Volume (cm^3) 1.571 cm^3 06/18/25 1026   Calculated Wound Volume (cm^3) 3 cm^3 06/18/25 1026   Change in Wound Size % -689.47 06/18/25 1026   Drainage Amount Moderate 06/23/25 1231   Drainage Description Yellow 06/23/25 1231   Miranda-wound Assessment Fragile;Erythema 06/23/25 1231   Treatments Cleansed 06/23/25 1231    Dressing Foam, Silicon (eg. Allevyn, etc) 06/23/25 1645   Wound packed? No 06/19/25 1600   Dressing Changed Changed 06/23/25 1231   Patient Tolerance Tolerated well 06/23/25 1231   Dressing Status Clean;Dry;Intact 06/23/25 0400       Wound 05/16/25 Traumatic Skin Tear Pretibial Left (Active)   Wound Image   06/23/25 1204   Wound Description Beefy red 06/23/25 1204   Non-staged Wound Description Partial thickness 06/23/25 1204   Wound Length (cm) 2.9 cm 06/23/25 1204   Wound Width (cm) 0.8 cm 06/23/25 1204   Wound Depth (cm) 0.1 cm 06/23/25 1204   Wound Surface Area (cm^2) 1.82 cm^2 06/23/25 1204   Wound Volume (cm^3) 0.121 cm^3 06/23/25 1204   Calculated Wound Volume (cm^3) 0.23 cm^3 06/23/25 1204   Change in Wound Size % 20.69 06/23/25 1204   Drainage Amount Scant 06/23/25 1204   Drainage Description Bloody 06/23/25 1204   Miranda-wound Assessment Clean;Dry 06/21/25 1600   Treatments Cleansed 06/23/25 1204   Dressing Dermagran gauze;Foam, Silicon (eg. Allevyn, etc) 06/23/25 1645   Wound packed? No 06/19/25 1600   Dressing Changed Changed 06/23/25 1204   Patient Tolerance Tolerated well 06/23/25 1204   Dressing Status Clean;Dry;Intact 06/23/25 0400       Wound 05/16/25 Diabetic Ulcer Foot Right;Lateral (Active)   Wound Image   06/20/25 1815   Wound Description GUILLE 06/23/25 0400   Dressing Gauze;Dry dressing;Other (Comment);Vaseline gauze;ABD 06/23/25 1645   Dressing Changed Changed 06/20/25 1815   Dressing Status Clean;Dry;Intact 06/23/25 1645       Wound 06/18/25 Groin (Active)   Wound Image   06/23/25 1226   Wound Description Beefy red 06/23/25 1226   Non-staged Wound Description Partial thickness 06/18/25 1011   Wound Length (cm) 9 cm 06/18/25 1011   Wound Width (cm) 10 cm 06/18/25 1011   Wound Depth (cm) 0.1 cm 06/18/25 1011   Wound Surface Area (cm^2) 70.69 cm^2 06/18/25 1011   Wound Volume (cm^3) 4.712 cm^3 06/18/25 1011   Calculated Wound Volume (cm^3) 9 cm^3 06/18/25 1011   Drainage Amount None 06/23/25 1221    Miranda-wound Assessment Rash 06/23/25 1226   Treatments Cleansed 06/23/25 1226   Dressing Open to air 06/23/25 1645   Wound packed? No 06/18/25 1011   Dressing Changed New 06/18/25 1011   Patient Tolerance Tolerated well 06/23/25 1226   Dressing Status Clean;Dry;Intact 06/21/25 1600       Wound 06/18/25 Pretibial Right (Active)   Wound Image   06/23/25 1202   Wound Description Fragile;Beefy red 06/23/25 1202   Non-staged Wound Description Partial thickness 06/23/25 1202   Wound Length (cm) 2.1 cm 06/23/25 1202   Wound Width (cm) 0.9 cm 06/23/25 1202   Wound Depth (cm) 0.1 cm 06/23/25 1202   Wound Surface Area (cm^2) 1.48 cm^2 06/23/25 1202   Wound Volume (cm^3) 0.099 cm^3 06/23/25 1202   Calculated Wound Volume (cm^3) 0.19 cm^3 06/23/25 1202   Change in Wound Size % -46.15 06/23/25 1202   Drainage Amount Scant 06/23/25 1202   Drainage Description Bloody 06/23/25 1202   Miranda-wound Assessment Purple 06/23/25 1202   Treatments Cleansed 06/23/25 1202   Dressing Dermagran gauze;Foam, Silicon (eg. Allevyn, etc) 06/23/25 1645   Wound packed? No 06/19/25 1600   Dressing Changed Changed 06/23/25 1202   Patient Tolerance Tolerated well 06/23/25 1202   Dressing Status Clean;Dry;Intact 06/23/25 0400       Wound 06/23/25 Traumatic Skin Tear Knee Anterior;Right (Active)   Wound Image   06/23/25 1155   Wound Description Beefy red 06/23/25 1155   Non-staged Wound Description Partial thickness 06/23/25 1155   Wound Length (cm) 1.5 cm 06/23/25 1155   Wound Width (cm) 1 cm 06/23/25 1155   Wound Depth (cm) 0.1 cm 06/23/25 1155   Wound Surface Area (cm^2) 1.18 cm^2 06/23/25 1155   Wound Volume (cm^3) 0.079 cm^3 06/23/25 1155   Calculated Wound Volume (cm^3) 0.15 cm^3 06/23/25 1155   Drainage Amount Scant 06/23/25 1155   Drainage Description Bloody 06/23/25 1155   Miranda-wound Assessment Purple 06/23/25 1155   Treatments Cleansed 06/23/25 1155   Dressing Dermagran gauze;Non adherent;Dry dressing;Gauze 06/23/25 1155   Dressing Changed  Changed 06/23/25 1155   Patient Tolerance Tolerated well 06/23/25 1155       Wound 06/17/25 Pressure Injury Buttocks Left (Active)   Wound Image   06/23/25 1233   Wound Description Yellow;Slough 06/23/25 1233   Pressure Injury Stage DTPI 06/23/25 1233   Wound Length (cm) 1.7 cm 06/23/25 1233   Wound Width (cm) 0.3 cm 06/23/25 1233   Wound Surface Area (cm^2) 0.4 cm^2 06/23/25 1233   Drainage Amount Small 06/23/25 1233   Drainage Description Yellow 06/23/25 1233   Miranda-wound Assessment Fragile;Excoriated;Erythema 06/23/25 1233   Treatments Cleansed 06/23/25 1233   Dressing Foam, Silicon (eg. Allevyn, etc);Other (Comment) 06/23/25 1233   Dressing Changed Changed 06/23/25 1233   Patient Tolerance Tolerated well 06/23/25 1233     Reviewed plan of care with primary RN Sadi  Recommendations written as orders  Wound care team to follow weekly while admitted  Questions or concerns Secure Chat Wound Care Nurse    Amelia Baxter BSN, RN, CWON

## 2025-06-23 NOTE — ASSESSMENT & PLAN NOTE
Hemoglobin 7.8   Iron panel reviewed, hold off on IV iron due to infection  Received 1 UPRBC 6/18  Daily CBC and trend hemoglobin  Is s/p transfusion 6/22 in the setting of pending surgical procedure and cardiac disease, anemia, anticoagulated status. Of note, patient does have antibodies, which could lead to potential delay in obtaining PRBCs, however this was not an issue yesterday

## 2025-06-23 NOTE — CASE MANAGEMENT
Case Management Discharge Planning Note    Patient name Gold Van  Location ICU 02/ICU  MRN [id number]  : 1945 Date 2025       Current Admission Date: 2025  Current Admission Diagnosis:Septic shock ([facility])   Patient Active Problem List    Diagnosis Date Noted    Pressure injury of skin of sacral region 2025    Metabolic acidosis 2025    Ambulatory dysfunction 2025    Osteomyelitis ([facility]) 2025    Anemia 2025    Pulmonary hypertension ([facility]) 2025    Septic shock ([facility]) 2025    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 ([facility]) 2025    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 ([facility]) 2025    PAF (paroxysmal atrial fibrillation) ([facility]) 2025    Chronic heart failure with preserved ejection fraction (HFpEF) ([facility]) 2025    Ulcer of right foot with fat layer exposed secondary to osteomyelitis 2025    Severe peripheral arterial disease ([facility]) 2024    Moderate protein-calorie malnutrition ([facility]) 10/09/2024    Gastroesophageal reflux disease without esophagitis 10/04/2024    Neuropathy 10/04/2024    Foot drop, left 10/04/2024    CVA (cerebrovascular accident) ([facility]) 10/02/2024    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[facility]) 2024    History of pneumothorax 2024    Non-recurrent bilateral inguinal hernia without obstruction or gangrene 2024    Pruritic condition 2024    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 2021    Acute renal failure superimposed on stage 2 chronic kidney disease  ([facility]) 2021    Hyponatremia 2021    Lung nodule 2021    Type 2 diabetes mellitus with complication, without long-term current use of insulin ([facility]) 10/23/2017    Essential hypertension 10/23/2017    Mild intermittent asthma without complication 10/23/2017    Mixed hyperlipidemia 10/23/2017      LOS (days): 6  Geometric Mean LOS (GMLOS) (days): 4.9  Days  to GMLOS:-0.9     OBJECTIVE:  Risk of Unplanned Readmission Score: 29.76         Current admission status: Inpatient   Preferred Pharmacy:   [facility] [place] - [address]  [address]  [place] [address]  Phone: [phone] Fax: [phone]    [facility] - [place] - [address]  [address] [facility] [address]  Phone: [phone] Fax: [phone]    [facility] ([facility]) #[id number] [place], [place] - [address] [address]  Phone: [phone] Fax: [phone]    Primary Care Provider: Shayne Diaz MD    Primary Insurance: [employer] [facility]  Secondary Insurance:     DISCHARGE DETAILS:  Pt had surgery scheduled for Friday, however was candelled d/t being on a heparin drip.  Pt will go for surgery today for TMA.  Will need a PT/OT evaluation after surgery.

## 2025-06-23 NOTE — PROGRESS NOTES
Progress Note - Hospitalist   Name: Gold Van 79 y.o. male I MRN: [id number]  Unit/Bed#: ICU 02-01 I Date of Admission: 6/17/2025   Date of Service: 6/23/2025 I Hospital Day: 6    Assessment & Plan  Septic shock ([facility])  SHOCK STATE RESOLVED  Source: RLE osteo  Hx serratia/pseudomonas/e faecalis wound infection in past  MRI R foot-Postsurgical changes of partial fourth and fifth ray resections with a soft tissue ulcer overlying the cut surfaces of the fourth and fifth metatarsals and findings concerning for early osteomyelitis in the residual fourth metatarsal. No definite MRI evidence of osteomyelitis. The ulcer is also in close approximation to the distal third metatarsal, and early osteomyelitis in that location cannot be excluded. No evidence of an abscess  Zosyn/Vanco D7  Plan for OR today  Acute renal failure superimposed on stage 2 chronic kidney disease  ([facility])  Lab Results   Component Value Date    EGFR 84 06/23/2025    EGFR 86 06/22/2025    EGFR 84 06/21/2025    CREATININE 0.81 06/23/2025    CREATININE 0.77 06/22/2025    CREATININE 0.81 06/21/2025     POA with creatinine 1.49  Creatinine is now around baseline of 0.9-1.0  Likely prerenal in setting of shock state  Daily metabolic panel and trend creatinine  Type 2 diabetes mellitus with complication, without long-term current use of insulin ([facility])  Lab Results   Component Value Date    HGBA1C 6.6 (H) 06/18/2025       Recent Labs     06/22/25  2355 06/23/25  0200 06/23/25  0408 06/23/25  0616   POCGLU 114 106 109 104       Blood Sugar Average: Last 72 hrs:  (P) 148.075    Glucose controlled  Continue insulin infusion for now   Home glipizide and metformin on hold   Essential hypertension  Holding home atenolol/lisinopril in post shock state  CVA (cerebrovascular accident) ([facility])  No residual deficits  Continue home statin  Holding clopidogrel for OR   PAF (paroxysmal atrial fibrillation) ([facility])  Home atenolol on hold due to shock state, resume when  appropriate   Home apixaban on hold, heparin infusion pending surgical intervention  Had episodes of atrial fibrillation with RVR 6/21 treated with 3 doses of metoprolol IV followed by Cardizem.  No further episodes  Heparin on hold for OR  Mixed hyperlipidemia  Continue home statin  Hyponatremia  Chronically 130-134  Stable  Metabolic panel and trend sodium  Pruritic condition  Home prednisone  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HCC)  Continue home inhaler  Gastroesophageal reflux disease without esophagitis  Continue home PPI  Severe peripheral arterial disease (HCC)  Hx R SFA stent 5/13  Home clopidogrel on hold due to plan for OR  Anemia  Hemoglobin 7.8   Iron panel reviewed, hold off on IV iron due to infection  Received 1 UPRBC 6/18  Daily CBC and trend hemoglobin  Is s/p transfusion 6/22 in the setting of pending surgical procedure and cardiac disease, anemia, anticoagulated status. Of note, patient does have antibodies, which could lead to potential delay in obtaining PRBCs, however this was not an issue yesterday  Pressure injury of skin of sacral region  Wound care  Metabolic acidosis  Resolved  Monitor metabolic panel  Ambulatory dysfunction  RLE NWB  Moderate protein-calorie malnutrition (HCC)  Malnutrition Findings:        Nutrition following                       BMI Findings:           Body mass index is 19.23 kg/m².       VTE Pharmacologic Prophylaxis: VTE Score: 3 Moderate Risk (Score 3-4) - Pharmacological DVT Prophylaxis Ordered: heparin drip.    Mobility:   Basic Mobility Inpatient Raw Score: 12  JH-HLM Goal: 4: Move to chair/commode  JH-HLM Achieved: 2: Bed activities/Dependent transfer  JH-HLM Goal NOT achieved. Continue with multidisciplinary rounding and encourage appropriate mobility to improve upon JH-HLM goals.    Patient Centered Rounds: I performed bedside rounds with nursing staff today.   Discussions with Specialists or Other Care Team Provider: Case management    Education and Discussions with Family  / Patient: Updated  (wife) via phone.    Current Length of Stay: 6 day(s)  Current Patient Status: Inpatient   Certification Statement: The patient will continue to require additional inpatient hospital stay due to plan for OR today, DKA, sepsis, atrial fibrillation, care coordination  Discharge Plan: Anticipate discharge in 48-72 hrs to rehab facility.    Code Status: Level 1 - Full Code    Subjective   Evaluated at bedside.  Feeling better.  No overnight events.    Objective :  Temp:  [97.9 °F (36.6 °C)-99.4 °F (37.4 °C)] 97.9 °F (36.6 °C)  HR:  [] 85  BP: (125-161)/(63-83) 151/83  Resp:  [14-20] 18  SpO2:  [93 %-100 %] 93 %  O2 Device: None (Room air)    Body mass index is 19.23 kg/m².     Input and Output Summary (last 24 hours):     Intake/Output Summary (Last 24 hours) at 6/23/2025 0802  Last data filed at 6/23/2025 0720  Gross per 24 hour   Intake 1007.74 ml   Output 3470 ml   Net -2462.26 ml       Physical Exam  Vitals and nursing note reviewed.   Constitutional:       General: He is not in acute distress.     Comments: Improved from yesterday, color is better, more alert, cam   HENT:      Head: Normocephalic and atraumatic.      Nose: Nose normal.      Mouth/Throat:      Mouth: Mucous membranes are moist.      Pharynx: Oropharynx is clear.     Eyes:      Pupils: Pupils are equal, round, and reactive to light.       Cardiovascular:      Rate and Rhythm: Normal rate. Rhythm irregular.      Pulses: Normal pulses.      Heart sounds: Normal heart sounds.   Pulmonary:      Effort: Pulmonary effort is normal. No respiratory distress.      Breath sounds: Normal breath sounds.   Abdominal:      General: Bowel sounds are normal.      Palpations: Abdomen is soft.      Tenderness: There is no abdominal tenderness.     Musculoskeletal:      Cervical back: Neck supple.      Right lower leg: No edema.      Left lower leg: No edema.     Skin:     General: Skin is warm and dry.      Capillary Refill:  Capillary refill takes less than 2 seconds.     Neurological:      General: No focal deficit present.      Mental Status: He is alert and oriented to person, place, and time.           Lines/Drains:              Lab Results: I have reviewed the following results:   Results from last 7 days   Lab Units 06/23/25  0622 06/18/25  0431 06/17/25  1112   WBC Thousand/uL 8.39   < > 17.93*   HEMOGLOBIN g/dL 9.8*   < > 8.8*   HEMATOCRIT % 31.0*   < > 28.4*   PLATELETS Thousands/uL 289   < > 361   BANDS PCT %  --   --  23*   SEGS PCT % 73   < >  --    LYMPHO PCT % 16   < > 0*   MONO PCT % 8   < > 1*   EOS PCT % 2   < > 0    < > = values in this interval not displayed.     Results from last 7 days   Lab Units 06/23/25  0622 06/18/25  1154 06/18/25  0431   SODIUM mmol/L 137   < > 134*   POTASSIUM mmol/L 3.8   < > 3.3*   CHLORIDE mmol/L 106   < > 105   CO2 mmol/L 25   < > 20*   BUN mg/dL 7   < > 28*   CREATININE mg/dL 0.81   < > 1.39*   ANION GAP mmol/L 6   < > 9   CALCIUM mg/dL 8.8   < > 8.4   ALBUMIN g/dL  --   --  2.9*   TOTAL BILIRUBIN mg/dL  --   --  0.27   ALK PHOS U/L  --   --  43   ALT U/L  --   --  10   AST U/L  --   --  16   GLUCOSE RANDOM mg/dL 101   < > 143*    < > = values in this interval not displayed.     Results from last 7 days   Lab Units 06/18/25  0431   INR  2.68*     Results from last 7 days   Lab Units 06/23/25  0616 06/23/25  0408 06/23/25  0200 06/22/25  2355 06/22/25  2203 06/22/25  1949 06/22/25  1806 06/22/25  1618 06/22/25  1358 06/22/25  1209 06/22/25  0954 06/22/25  0751   POC GLUCOSE mg/dl 104 109 106 114 116 124 143* 211* 262* 223* 161* 108     Results from last 7 days   Lab Units 06/18/25  0552   HEMOGLOBIN A1C % 6.6*     Results from last 7 days   Lab Units 06/19/25  0351 06/18/25  0431 06/18/25  0008 06/17/25  2135 06/17/25  1347 06/17/25  1112   LACTIC ACID mmol/L  --   --  1.6 2.1* 3.7* 3.7*   PROCALCITONIN ng/ml 18.42* 27.46*  --   --   --  14.91*       Recent Cultures (last 7 days):    Results from last 7 days   Lab Units 06/17/25  1112   BLOOD CULTURE  No Growth After 5 Days.  Staphylococcus aureus*   GRAM STAIN RESULT  Gram positive cocci in clusters*             Last 24 Hours Medication List:     Current Facility-Administered Medications:     acetaminophen (TYLENOL) tablet 650 mg, Q6H PRN    albuterol (PROVENTIL HFA,VENTOLIN HFA) inhaler 2 puff, Q4H PRN    albuterol inhalation solution 2.5 mg, Q6H PRN    atorvastatin (LIPITOR) tablet 40 mg, QPM    Budeson-Glycopyrrol-Formoterol 160-9-4.8 MCG/ACT AERO 2 puff, Q12H SANDRA    diltiazem (CARDIZEM) 125 mg in sodium chloride 0.9 % 125 mL infusion, Titrated    diltiazem (CARDIZEM) injection 15 mg, Once    famotidine (PEPCID) tablet 20 mg, Daily    heparin (porcine) injection 2,600 Units, Q6H PRN    heparin (porcine) injection 5,200 Units, Q6H PRN    insulin regular (HumuLIN R,NovoLIN R) 1 Units/mL in sodium chloride 0.9 % 100 mL infusion, Titrated, Last Rate: 0.5 Units/hr (06/23/25 0202)    montelukast (SINGULAIR) tablet 10 mg, HS    multivitamin-minerals (CENTRUM) tablet 1 tablet, Daily    ondansetron (ZOFRAN) injection 4 mg, Q6H PRN    [COMPLETED] piperacillin-tazobactam (ZOSYN) 4.5 g in sodium chloride 0.9 % 100 mL IV LOADING DOSE, Once, Last Rate: 4.5 g (06/17/25 1600) **FOLLOWED BY** piperacillin-tazobactam (ZOSYN) 4.5 g in sodium chloride 0.9 % 100 mL IVPB (EXTENDED INFUSION), Q8H, Last Rate: 4.5 g (06/23/25 0205)    polyethylene glycol (MIRALAX) packet 17 g, Daily PRN    predniSONE tablet 5 mg, Daily    senna-docusate sodium (SENOKOT S) 8.6-50 mg per tablet 2 tablet, HS    vancomycin 750 mg in sodium chloride 0.9% 250 mL, Q12H    Administrative Statements   Today, Patient Was Seen By: BELGICA Thompson      **Please Note: This note may have been constructed using a voice recognition system.**

## 2025-06-24 LAB
ANION GAP SERPL CALCULATED.3IONS-SCNC: 7 MMOL/L (ref 4–13)
APTT PPP: 31 SECONDS (ref 23–34)
APTT PPP: 33 SECONDS (ref 23–34)
APTT PPP: 33 SECONDS (ref 23–34)
BASOPHILS # BLD AUTO: 0.01 THOUSANDS/ÂΜL (ref 0–0.1)
BASOPHILS NFR BLD AUTO: 0 % (ref 0–1)
BUN SERPL-MCNC: 7 MG/DL (ref 5–25)
CALCIUM SERPL-MCNC: 8.6 MG/DL (ref 8.4–10.2)
CHLORIDE SERPL-SCNC: 102 MMOL/L (ref 96–108)
CO2 SERPL-SCNC: 24 MMOL/L (ref 21–32)
CREAT SERPL-MCNC: 1.04 MG/DL (ref 0.6–1.3)
EOSINOPHIL # BLD AUTO: 0.04 THOUSAND/ÂΜL (ref 0–0.61)
EOSINOPHIL NFR BLD AUTO: 0 % (ref 0–6)
ERYTHROCYTE [DISTWIDTH] IN BLOOD BY AUTOMATED COUNT: 15.5 % (ref 11.6–15.1)
GFR SERPL CREATININE-BSD FRML MDRD: 67 ML/MIN/1.73SQ M
GLUCOSE SERPL-MCNC: 118 MG/DL (ref 65–140)
GLUCOSE SERPL-MCNC: 124 MG/DL (ref 65–140)
GLUCOSE SERPL-MCNC: 131 MG/DL (ref 65–140)
GLUCOSE SERPL-MCNC: 143 MG/DL (ref 65–140)
GLUCOSE SERPL-MCNC: 171 MG/DL (ref 65–140)
GLUCOSE SERPL-MCNC: 178 MG/DL (ref 65–140)
GLUCOSE SERPL-MCNC: 203 MG/DL (ref 65–140)
GLUCOSE SERPL-MCNC: 240 MG/DL (ref 65–140)
GLUCOSE SERPL-MCNC: 263 MG/DL (ref 65–140)
GLUCOSE SERPL-MCNC: 347 MG/DL (ref 65–140)
HCT VFR BLD AUTO: 30.8 % (ref 36.5–49.3)
HGB BLD-MCNC: 9.7 G/DL (ref 12–17)
IMM GRANULOCYTES # BLD AUTO: 0.05 THOUSAND/UL (ref 0–0.2)
IMM GRANULOCYTES NFR BLD AUTO: 1 % (ref 0–2)
LYMPHOCYTES # BLD AUTO: 1.34 THOUSANDS/ÂΜL (ref 0.6–4.47)
LYMPHOCYTES NFR BLD AUTO: 15 % (ref 14–44)
MCH RBC QN AUTO: 28 PG (ref 26.8–34.3)
MCHC RBC AUTO-ENTMCNC: 31.5 G/DL (ref 31.4–37.4)
MCV RBC AUTO: 89 FL (ref 82–98)
MONOCYTES # BLD AUTO: 0.8 THOUSAND/ÂΜL (ref 0.17–1.22)
MONOCYTES NFR BLD AUTO: 9 % (ref 4–12)
NEUTROPHILS # BLD AUTO: 6.96 THOUSANDS/ÂΜL (ref 1.85–7.62)
NEUTS SEG NFR BLD AUTO: 75 % (ref 43–75)
NRBC BLD AUTO-RTO: 0 /100 WBCS
PLATELET # BLD AUTO: 295 THOUSANDS/UL (ref 149–390)
PMV BLD AUTO: 9.4 FL (ref 8.9–12.7)
POTASSIUM SERPL-SCNC: 4 MMOL/L (ref 3.5–5.3)
RBC # BLD AUTO: 3.46 MILLION/UL (ref 3.88–5.62)
SODIUM SERPL-SCNC: 133 MMOL/L (ref 135–147)
WBC # BLD AUTO: 9.2 THOUSAND/UL (ref 4.31–10.16)

## 2025-06-24 PROCEDURE — 97167 OT EVAL HIGH COMPLEX 60 MIN: CPT

## 2025-06-24 PROCEDURE — 94664 DEMO&/EVAL PT USE INHALER: CPT

## 2025-06-24 PROCEDURE — 99232 SBSQ HOSP IP/OBS MODERATE 35: CPT | Performed by: NURSE PRACTITIONER

## 2025-06-24 PROCEDURE — 82948 REAGENT STRIP/BLOOD GLUCOSE: CPT

## 2025-06-24 PROCEDURE — 85025 COMPLETE CBC W/AUTO DIFF WBC: CPT

## 2025-06-24 PROCEDURE — 94760 N-INVAS EAR/PLS OXIMETRY 1: CPT

## 2025-06-24 PROCEDURE — 97163 PT EVAL HIGH COMPLEX 45 MIN: CPT

## 2025-06-24 PROCEDURE — 85730 THROMBOPLASTIN TIME PARTIAL: CPT | Performed by: PHYSICIAN ASSISTANT

## 2025-06-24 PROCEDURE — 80048 BASIC METABOLIC PNL TOTAL CA: CPT

## 2025-06-24 PROCEDURE — 99024 POSTOP FOLLOW-UP VISIT: CPT | Performed by: PODIATRIST

## 2025-06-24 RX ORDER — INSULIN LISPRO 100 [IU]/ML
1-5 INJECTION, SOLUTION INTRAVENOUS; SUBCUTANEOUS
Status: DISCONTINUED | OUTPATIENT
Start: 2025-06-24 | End: 2025-07-05 | Stop reason: HOSPADM

## 2025-06-24 RX ORDER — CLOPIDOGREL BISULFATE 75 MG/1
75 TABLET ORAL DAILY
Status: DISCONTINUED | OUTPATIENT
Start: 2025-06-24 | End: 2025-07-05 | Stop reason: HOSPADM

## 2025-06-24 RX ORDER — HEPARIN SODIUM 10000 [USP'U]/100ML
3-20 INJECTION, SOLUTION INTRAVENOUS
Status: ACTIVE | OUTPATIENT
Start: 2025-06-24 | End: 2025-06-24

## 2025-06-24 RX ADMIN — Medication 4.5 G: at 09:57

## 2025-06-24 RX ADMIN — BUDESONIDE, GLYCOPYRROLATE, AND FORMOTEROL FUMARATE 2 PUFF: 160; 9; 4.8 AEROSOL, METERED RESPIRATORY (INHALATION) at 08:35

## 2025-06-24 RX ADMIN — VANCOMYCIN HYDROCHLORIDE 1250 MG: 1 INJECTION, POWDER, LYOPHILIZED, FOR SOLUTION INTRAVENOUS at 09:57

## 2025-06-24 RX ADMIN — BUDESONIDE, GLYCOPYRROLATE, AND FORMOTEROL FUMARATE 2 PUFF: 160; 9; 4.8 AEROSOL, METERED RESPIRATORY (INHALATION) at 21:00

## 2025-06-24 RX ADMIN — OXYCODONE HYDROCHLORIDE 5 MG: 5 TABLET ORAL at 19:21

## 2025-06-24 RX ADMIN — Medication 4.5 G: at 17:14

## 2025-06-24 RX ADMIN — HEPARIN SODIUM 16 UNITS/KG/HR: 10000 INJECTION, SOLUTION INTRAVENOUS at 05:34

## 2025-06-24 RX ADMIN — Medication 4.5 G: at 02:15

## 2025-06-24 RX ADMIN — INSULIN LISPRO 2 UNITS: 100 INJECTION, SOLUTION INTRAVENOUS; SUBCUTANEOUS at 22:42

## 2025-06-24 RX ADMIN — PREDNISONE 5 MG: 5 TABLET ORAL at 08:35

## 2025-06-24 RX ADMIN — HEPARIN SODIUM 3600 UNITS: 1000 INJECTION, SOLUTION INTRAVENOUS; SUBCUTANEOUS at 06:43

## 2025-06-24 RX ADMIN — OXYCODONE HYDROCHLORIDE 5 MG: 5 TABLET ORAL at 00:28

## 2025-06-24 RX ADMIN — MICONAZOLE NITRATE: 20 CREAM TOPICAL at 08:34

## 2025-06-24 RX ADMIN — APIXABAN 5 MG: 5 TABLET, FILM COATED ORAL at 16:07

## 2025-06-24 RX ADMIN — FAMOTIDINE 20 MG: 20 TABLET, FILM COATED ORAL at 08:35

## 2025-06-24 RX ADMIN — OXYCODONE HYDROCHLORIDE 5 MG: 5 TABLET ORAL at 10:27

## 2025-06-24 RX ADMIN — CLOPIDOGREL 75 MG: 75 TABLET ORAL at 12:31

## 2025-06-24 RX ADMIN — HEPARIN SODIUM 3600 UNITS: 1000 INJECTION, SOLUTION INTRAVENOUS; SUBCUTANEOUS at 00:22

## 2025-06-24 RX ADMIN — Medication 1 TABLET: at 08:35

## 2025-06-24 RX ADMIN — ATORVASTATIN CALCIUM 40 MG: 40 TABLET, FILM COATED ORAL at 17:13

## 2025-06-24 RX ADMIN — MICONAZOLE NITRATE: 20 CREAM TOPICAL at 17:13

## 2025-06-24 RX ADMIN — INSULIN LISPRO 4 UNITS: 100 INJECTION, SOLUTION INTRAVENOUS; SUBCUTANEOUS at 16:07

## 2025-06-24 RX ADMIN — MONTELUKAST 10 MG: 10 TABLET, FILM COATED ORAL at 22:36

## 2025-06-24 RX ADMIN — OXYCODONE HYDROCHLORIDE 5 MG: 5 TABLET ORAL at 06:21

## 2025-06-24 NOTE — PLAN OF CARE
Problem: OCCUPATIONAL THERAPY ADULT  Goal: Performs self-care activities at highest level of function for planned discharge setting.  See evaluation for individualized goals.  Description: Treatment Interventions: ADL retraining, Functional transfer training, UE strengthening/ROM, Endurance training, Patient/family training, Compensatory technique education, Energy conservation, Activityengagement      See flowsheet documentation for full assessment, interventions and recommendations.   Note: Limitation: Decreased ADL status, Decreased UE strength, Decreased Safe judgement during ADL, Decreased endurance, Decreased self-care trans, Decreased high-level ADLs  Prognosis: Good  Assessment: Pt is a 79 y.o. male seen for OT evaluation s/p admit to [facility] on 6/17/2025 w/ Septic shock (HCC).  Comorbidities affecting pt's functional performance at time of assessment include: type 2 diabetes, HTN, mixed hyperlipidemia, COPD, CVA hx, GERD, PAF, anemia, ambulatory dysfunction, neuropathy, left drop foot. Personal factors affecting pt at time of IE include:steps to enter environment, difficulty performing ADLS, difficulty performing IADLS , decreased initiation and engagement , health management , and environment. OT order placed to assess Pt's ADLs, cognitive status, and performance during functional tasks in order to maximize safety and independence while making most appropriate d/c recommendations. Prior to admission, pt was I with ADLs, mobility and IADLs. Upon evaluation: the following deficits impact occupational performance: weakness, decreased strength, decreased balance, decreased tolerance, impaired sequencing, impaired problem solving, decreased safety awareness, and increased pain. Pt's clinical presentation is currently evolving given new onset deficits that effect Pt's occupational performance and ability to safely return to OF including decrease activity tolerance, decrease standing balance, decrease  sitting balance, decrease performance during ADL tasks, decrease safety awareness , increased pain, decrease generalized strength, decrease activity engagement, decrease performance during functional transfers, steps to enter home, and high fall risk combined with medical complications of hypertension , pain impacting overall mobility status, abnormal CBC, edema/swelling, fever, wounds, decreased skin integrity, multiple readmissions, and need for input for mobility technique/safety.  Pt to benefit from continued skilled OT tx while in the hospital to address deficits as defined above and maximize level of functional independence w ADL's and functional mobility. Occupational Performance areas to address include: grooming, bathing/shower, toilet hygiene, dressing, functional mobility, community mobility, and clothing management. From OT standpoint, recommendation at time of d/c would with max intensity OT resources.     Rehab Resource Intensity Level, OT: I (Maximum Resource Intensity)     Marcella Redd OTR/L

## 2025-06-24 NOTE — PLAN OF CARE
Problem: CARDIOVASCULAR - ADULT  Goal: Maintains optimal cardiac output and hemodynamic stability  Description: INTERVENTIONS:  - Monitor I/O, vital signs and rhythm  - Monitor for S/S and trends of decreased cardiac output  - Administer and titrate ordered vasoactive medications to optimize hemodynamic stability  - Assess quality of pulses, skin color and temperature  - Assess for signs of decreased coronary artery perfusion  - Instruct patient to report change in severity of symptoms  Outcome: Progressing  Goal: Absence of cardiac dysrhythmias or at baseline rhythm  Description: INTERVENTIONS:  - Continuous cardiac monitoring, vital signs, obtain 12 lead EKG if ordered  - Administer antiarrhythmic and heart rate control medications as ordered  - Monitor electrolytes and administer replacement therapy as ordered  Outcome: Progressing   Assess R anterior tibial pulse w Doppler Q4hr

## 2025-06-24 NOTE — CASE MANAGEMENT
Case Management Discharge Planning Note    Patient name Gold aVn  Location ICU 02/ICU  MRN [id number]  : 1945 Date 2025       Current Admission Date: 2025  Current Admission Diagnosis:Septic shock ([facility])   Patient Active Problem List    Diagnosis Date Noted    Pressure injury of skin of sacral region 2025    Metabolic acidosis 2025    Ambulatory dysfunction 2025    Osteomyelitis ([facility]) 2025    Anemia 2025    Pulmonary hypertension ([facility]) 2025    Septic shock ([facility]) 2025    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 ([facility]) 2025    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 ([facility]) 2025    PAF (paroxysmal atrial fibrillation) ([facility]) 2025    Chronic heart failure with preserved ejection fraction (HFpEF) ([facility]) 2025    Ulcer of right foot with fat layer exposed secondary to osteomyelitis 2025    Severe peripheral arterial disease ([facility]) 2024    Moderate protein-calorie malnutrition ([facility]) 10/09/2024    Gastroesophageal reflux disease without esophagitis 10/04/2024    Neuropathy 10/04/2024    Foot drop, left 10/04/2024    CVA (cerebrovascular accident) ([facility]) 10/02/2024    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[facility]) 2024    History of pneumothorax 2024    Non-recurrent bilateral inguinal hernia without obstruction or gangrene 2024    Pruritic condition 2024    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 2021    Acute renal failure superimposed on stage 2 chronic kidney disease  ([facility]) 2021    Hyponatremia 2021    Lung nodule 2021    Type 2 diabetes mellitus with complication, without long-term current use of insulin ([facility]) 10/23/2017    Essential hypertension 10/23/2017    Mild intermittent asthma without complication 10/23/2017    Mixed hyperlipidemia 10/23/2017      LOS (days): 7  Geometric Mean LOS (GMLOS) (days): 4.9  Days  to GMLOS:-2.2     OBJECTIVE:  Risk of Unplanned Readmission Score: 31.8         Current admission status: Inpatient   Preferred Pharmacy:   [facility] [place] - [address]  [address]  [place] [address]  Phone: [phone] Fax: [phone]    [facility] - [place] - [address]  [address] [facility] [address]  Phone: [phone] Fax: [phone]    [facility] ([facility]) #[id number] [place], [place] - [address] [address]  Phone: [phone] Fax: [phone]    Primary Care Provider: Shayne Diaz MD    Primary Insurance: [employer]  Secondary Insurance:     DISCHARGE DETAILS:  PT is recommending STR.  Did discuss with the pt prior to surgery and the pt wanted a referral made to [facility] ARU.  Referral made via [employer].  Would need authorization for ARU.

## 2025-06-24 NOTE — NURSING NOTE
Room air; VS and sats stable. Sleeping when undisturbed. Arouses to name. Oriented when awake. C/o R leg/foot pain when awake; Dilaudid per PRN. Resist care intermittently. Ivs infusing. R anterior tibial pulse with oppler. R leg/foot dressings dry and intact.     0022:  Continues with R leg/foot pain; Oxy per PRN. Rebolused w/Heparin and gtt increased per protocol    0400:  Assessment unchanged. Sleeping most times.    0623:  Medicated for c/o pain R leg/foot. R foot dsgs dry and intact    0644:  Rebolused w Heparin and gtt increased per protocol

## 2025-06-24 NOTE — ASSESSMENT & PLAN NOTE
Home atenolol on hold due to shock state, resume when appropriate   Home apixaban on hold, heparin infusion pending surgical intervention  Had episodes of atrial fibrillation with RVR 6/21 treated with 3 doses of metoprolol IV followed by Cardizem.  No further episodes  Heparin infusion transitioned to apixaban, ok per podiatry

## 2025-06-24 NOTE — PHYSICAL THERAPY NOTE
PHYSICAL THERAPY EVALUATION  NAME:  Gold Van  DATE: 06/24/25    AGE:   79 y.o.  Mrn:   [id number]  ADMIT DX:  Hyponatremia [E87.1]  Metabolic acidosis [E87.20]  Anemia [D64.9]  GERDA (acute kidney injury) (HCC) [N17.9]  Visit for wound check [Z51.89]  Septic shock (HCC) [A41.9, R65.21]  Diabetic ulcer of right midfoot (HCC) [E11.621, L97.419]  Problem List: Problem List[1]    Past Medical History  Past Medical History[2]    Past Surgical History  Past Surgical History[3]    Length Of Stay: 7  Performed at least 2 patient identifiers during session: Name and ID bracelet       06/24/25 0908   PT Last Visit   PT Visit Date 06/24/25   Note Type   Note type Evaluation   Pain Assessment   Pain Assessment Tool 0-10   Pain Score 5   Pain Location/Orientation Orientation: Right;Location: Foot   Pain Onset/Description Onset: Ongoing   Hospital Pain Intervention(s) Medication (See MAR);Repositioned   Restrictions/Precautions   Weight Bearing Precautions Per Order Yes   RLE Weight Bearing Per Order NWB   Other Precautions Chair Alarm;Bed Alarm;WBS;Multiple lines;Telemetry;Fall Risk;Pain   Home Living   Type of Home House   Home Layout Two level;Performs ADLs on one level;Able to live on main level with bedroom/bathroom;Bed/bath upstairs  (13 steps to main living floor; anticipate staying in first floor living room upon discharge)   Bathroom Shower/Tub Walk-in shower   Bathroom Toilet Raised   Bathroom Equipment Shower chair;Grab bars in shower;Grab bars around toilet   Home Equipment Cane;Walker  (Pt reports both RW and SPC used at baseline)   Prior Function   Level of [place] Independent with ADLs;Independent with functional mobility;Needs assistance with IADLS   Lives With Spouse   Receives Help From Family   IADLs Independent with driving;Family/Friend/Other provides meals;Family/Friend/Other provides medication management   Falls in the last 6 months 0  (pt denies)   Vocational Retired   General    Family/Caregiver Present No   Cognition   Overall Cognitive Status WFL   Arousal/Participation Alert   Attention Within functional limits   Orientation Level Oriented X4   Memory Decreased recall of precautions   Following Commands Follows one step commands without difficulty   RLE Assessment   RLE Assessment X   Strength RLE   RLE Overall Strength 3+/5   LLE Assessment   LLE Assessment X   Strength LLE   LLE Overall Strength 4-/5   Vision-Basic Assessment   Current Vision Wears glasses all the time   Light Touch   RLE Light Touch Impaired   Bed Mobility   Supine to Sit 4  Minimal assistance   Additional items Increased time required;Verbal cues;Assist x 1;HOB elevated   Sit to Supine 3  Moderate assistance   Additional items Increased time required;Verbal cues;LE management;Assist x 2   Additional Comments pt denied dizziness with transitional movement   Transfers   Sit to Stand 3  Moderate assistance   Additional items Assist x 2;Increased time required;Verbal cues   Stand to Sit 3  Moderate assistance   Additional items Assist x 2;Increased time required;Verbal cues   Additional Comments pt required cues for proper hand placement and to maintain WBS; pt stood for 30 secs with Min Ax 2 with RW with posterior lean; cues to shift weightforward and increase UE support  (attempted lateral side scoot in sitting however pt unable to perform)   Ambulation/Elevation   Ambulation/Elevation Additional Comments fair safety awareness noted   Balance   Static Sitting Good   Dynamic Sitting Fair +   Static Standing Poor +   Dynamic Standing Poor   Endurance Deficit   Endurance Deficit Yes   Endurance Deficit Description pt reported fatigue with activity  (HR increased into the 160 with mobility; RN aware and fruther mobility deferred)   Activity Tolerance   Activity Tolerance Patient limited by fatigue;Patient limited by pain   Assessment   Prognosis Fair   Assessment Pt is 79 y.o. male seen for PT evaluation s/p admit to [facility] on 6/17/2025 w/ Septic shock (HCC). PT consulted to assess pt's functional mobility and d/c needs. Order placed for PT eval and tx, w/ NWB RLE order. Pt agreeable to PT  session upon arrival, pt found supine in bed.  PTA, pt was independent w/ all functional mobility w/ SPC and RW, ambulates community distances and elevations, has 1 NEERU, lives w/ spouse in 2 level home, and retired.  Pt to benefit from continued PT tx to address deficits and maximize level of functional independent mobility and consistency. Upon conclusion pt  supine in bed. Complexity: Comorbidities affecting pt's physical performance at time of assessment include: DM, CVA, CKD, and malnutrition, GERDA, anemia, R foot ulcer and septic shock . Personal factors affecting pt at time of IE include: limited mobility, lives in multistory home, steps to enter home, and inability to ambulate household distances. Please find objective findings from PT assessment regarding body systems outlined above with impairments and limitations including weakness, impaired balance, decreased endurance, pain, decreased activity tolerance, decreased functional mobility tolerance, altered sensation, fall risk, orthopedic restrictions, and decreased skin integrity.  Pt's clinical presentation is currently unstable/unpredictable seen in pt's presentation of abnormal H&H, abnormal sodium levels, abnormal blood sugar levels, pain, recent surgery, polypharmacy, wounds, telemetry use, step down status, and decline in mobility status. The patient's AM-PAC Basic Mobility Inpatient Short Form Raw Score is 10.  Based on patient presentations and impairments, pt would most appropriately benefit from Level 1 resource intensity upon discharge. A Raw score of less than or equal to 16 suggests the patient may benefit from discharge to post-acute rehabilitation services. Please also refer to the recommendation of the Physical Therapist for safe discharge planning. RN  verbalized pt appropriate for PT session. Pt seen as a co-eval with OT due to the patient's co-morbidities, clinically unstable presentation, and present impairments which are a regression from the patient's baseline.   Barriers to Discharge Inaccessible home environment   Goals   Patient Goals to talk to his wife on the phone and see the [employer] movie   LTG Expiration Date 07/04/25   Long Term Goal #1 Pt will: Perform bed mobility tasks to modified I to improve ease of bed mobility. Perform transfers to modified I to improve ease of transfers. Perform ambulation with MI and RW for 250 feet to increase Indep in home environment. Increase dynamic standing balance to F+ to decrease fall risk. Increase OOB activity tolerance to 10 minutes without s/s of exertion to decrease fall risk. Navigate up and down 1 steps with MI so patient can enter and exit home.   Plan   Treatment/Interventions Functional transfer training;Therapeutic exercise;Endurance training;Gait training;Bed mobility;Equipment eval/education;Elevations   PT Frequency 3-5x/wk   Discharge Recommendation   Rehab Resource Intensity Level, PT I (Maximum Resource Intensity)   Equipment Recommended   (TBD)   AM-PAC Basic Mobility Inpatient   Turning in Flat Bed Without Bedrails 3   Lying on Back to Sitting on Edge of Flat Bed Without Bedrails 3   Moving Bed to Chair 1   Standing Up From Chair Using Arms 1   Walk in Room 1   Climb 3-5 Stairs With Railing 1   Basic Mobility Inpatient Raw Score 10   Turning Head Towards Sound 4   Follow Simple Instructions 4   Low Function Basic Mobility Raw Score  18   Low Function Basic Mobility Standardized Score  29.25   [facility] Highest Level Of Mobility   -HLM Goal 4: Move to chair/commode   -HLM Achieved 3: Sit at edge of bed       Time In: 0839  Time Out: 0908  Total Evaluation Minutes: 29    Leeanna Henao, PT         [1]   Patient Active Problem List  Diagnosis    Type 2 diabetes mellitus with  complication, without long-term current use of insulin (HCC)    Essential hypertension    Mild intermittent asthma without complication    Mixed hyperlipidemia    Acute renal failure superimposed on stage 2 chronic kidney disease  (HCC)    Hyponatremia    Lung nodule    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    Non-recurrent bilateral inguinal hernia without obstruction or gangrene    Pruritic condition    History of pneumothorax    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HCC)    CVA (cerebrovascular accident) (HCC)    Gastroesophageal reflux disease without esophagitis    Neuropathy    Foot drop, left    Moderate protein-calorie malnutrition (HCC)    Severe peripheral arterial disease (HCC)    Ulcer of right foot with fat layer exposed secondary to osteomyelitis    PAF (paroxysmal atrial fibrillation) (HCC)    Chronic heart failure with preserved ejection fraction (HFpEF) (HCC)    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)    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 (HCC)    Septic shock (HCC)    Pulmonary hypertension (HCC)    Anemia    Osteomyelitis (HCC)    Pressure injury of skin of sacral region    Metabolic acidosis    Ambulatory dysfunction   [2]   Past Medical History:  Diagnosis Date    Asthma     COPD (chronic obstructive pulmonary disease) (HCC)     COVID-19     CVA (cerebral vascular accident) (HCC)     Diabetes mellitus (HCC)     Environmental allergies     Hyperlipidemia     Hypertension     Macular degeneration 07/13/2020    right eye    Orthostatic hypotension     Osteopenia     Pneumonia     Pneumothorax     Sinusitis    [3]   Past Surgical History:  Procedure Laterality Date    CARPAL TUNNEL RELEASE Left 08/2024    CATARACT EXTRACTION Left 07/2024    COLONOSCOPY      KNEE CARTILAGE SURGERY Right 1964    MD AMPUTATION METATARSAL W/TOE SINGLE Right 05/16/2025    Procedure: RAY RESECTION FOOT - R partial 5th ray resection;  Surgeon: Reinaldo  LISA Brewer;  Location: [facility];  Service: Podiatry    AR AMPUTATION METATARSAL W/TOE SINGLE Right 05/23/2025    Procedure: Right partial 4th ray amputation, wound debridement;  Surgeon: Brandon Phillips DPM;  Location: [facility];  Service: Podiatry    AR TEAEC W/WO PATCH GRAFT COMMON FEMORAL Right 05/13/2025    Procedure: Right common femoral endarterectomy with bovine pericardial patch Right lower extremity angiogram Third order cannulation of the right anterior tibial artery Balloon angioplasty of the right anterior tibial with a 3x220 [place] balloon DCB angioplasty of the SFA with a 6x150 [place] balloon Stenting of the right SFA with two 6x150 Grazyna stents, 6x5cm Viabahn, and a 7x5cm viabahn Post-di    VASECTOMY      WISDOM TOOTH EXTRACTION      x4

## 2025-06-24 NOTE — ASSESSMENT & PLAN NOTE
Hemoglobin 7.8   Iron panel reviewed, hold off on IV iron due to infection  Received 1 UPRBC 6/18  Daily CBC and trend hemoglobin  Is s/p transfusion 6/22 in the setting of pending surgical procedure and cardiac disease, anemia, anticoagulated status. Of note, patient does have antibodies, which could lead to potential delay in obtaining PRBCs

## 2025-06-24 NOTE — ASSESSMENT & PLAN NOTE
CHIEF COMPLAINT:    1.  Status post left foot second and third metatarsal shortening osteotomies.  2.  Left ankle arthritis.  3.  Status post left ankle arthroscopic debridement.    HISTORY OF PRESENT ILLNESS:  Ms. Benson was evaluated today.  She reports to have some pain and discomfort in the ankle.  The patient was evaluated via phone in 02/2021.  At that time, we recommended to proceed with a corticosteroid injection into the left ankle.  Unfortunately, because she did not develop enough pain and discomfort, she never pursued injection.  Now, she reports to have enough pain and discomfort.    PHYSICAL EXAMINATION:  On today's exam, she presents with full range of motion of the left ankle, hindfoot and midfoot joints.  CMS intact.  Skin is intact.  There is no effusion.  The patient presents with a painful bump along the dorsal medial aspect of the first metatarsal, which is most likely a component of exostosis.  The patient does not present with any hardware at that level.    IMAGING:  Three views of the left ankle were obtained today, which are significant for showing some very mild osteoarthritis without any obvious impingement.    ASSESSMENT:   1.  Left ankle arthritis.  2.  Status post left second and third metatarsal shortening osteotomies and ankle arthroscopy.    3.  Painful exostosis, left first metatarsal.    PLAN:  Discussed with the patient that, at this point, my recommendation is to proceed with injection of the ankle joint.  The injection will be performed under fluoroscopic control with lidocaine and Kenalog.  In the meantime, she has no activity restrictions.    All questions were answered.    TT:  20 minutes.  CT:  15 minutes.       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 No definite MRI evidence of osteomyelitis. The ulcer is also in close approximation to the distal third metatarsal, and early osteomyelitis in that location cannot be excluded. No evidence of an abscess  Continue Zosyn/Vanco D8 (continuing antibiotics a bit longer due to severity of infection after discussion with podiatry.  De-escalate when appropriate)  S/p R TMA 6/23/2025  Tissue exam pending  Discharge planning 24 to 48 hours.  Case management assistance appreciated, submitting to [facility] which is patient's preference

## 2025-06-24 NOTE — PROGRESS NOTES
Progress Note - Hospitalist   Name: Gold Van 79 y.o. male I MRN: [id number]  Unit/Bed#: ICU 02-01 I Date of Admission: 6/17/2025   Date of Service: 6/24/2025 I Hospital Day: 7    Assessment & Plan  Septic shock ([facility])  SHOCK STATE RESOLVED  Source: RLE osteo  Hx serratia/pseudomonas/e faecalis wound infection in past  MRI R foot-Postsurgical changes of partial fourth and fifth ray resections with a soft tissue ulcer overlying the cut surfaces of the fourth and fifth metatarsals and findings concerning for early osteomyelitis in the residual fourth metatarsal. No definite MRI evidence of osteomyelitis. The ulcer is also in close approximation to the distal third metatarsal, and early osteomyelitis in that location cannot be excluded. No evidence of an abscess  Continue Zosyn/Vanco D8 (continuing antibiotics a bit longer due to severity of infection after discussion with podiatry.  De-escalate when appropriate)  S/p R TMA 6/23/2025  Tissue exam pending  Discharge planning 24 to 48 hours.  Case management assistance appreciated, submitting to [facility] which is patient's preference  Acute renal failure superimposed on stage 2 chronic kidney disease  ([facility])  Lab Results   Component Value Date    EGFR 67 06/24/2025    EGFR 84 06/23/2025    EGFR 86 06/22/2025    CREATININE 1.04 06/24/2025    CREATININE 0.81 06/23/2025    CREATININE 0.77 06/22/2025     POA with creatinine 1.49  Creatinine is now around baseline of 0.9-1.0  Likely prerenal in setting of shock state  Daily metabolic panel and trend creatinine  Type 2 diabetes mellitus with complication, without long-term current use of insulin ([facility])  Lab Results   Component Value Date    HGBA1C 6.6 (H) 06/18/2025       Recent Labs     06/24/25  0416 06/24/25  0554 06/24/25  0801 06/24/25  0956   POCGLU 143* 131 124 203*       Blood Sugar Average: Last 72 hrs:  (P) 150.7585709706887445    Glucose controlled  Transition insulin infusion to subcutaneous insulin  Fingerstick blood  sugar  Home glipizide and metformin on hold   Essential hypertension  Holding home atenolol/lisinopril in post shock state  CVA (cerebrovascular accident) ([facility])  No residual deficits  Continue home statin  Clopidogrel resumed  PAF (paroxysmal atrial fibrillation) ([facility])  Home atenolol on hold due to shock state, resume when appropriate   Home apixaban on hold, heparin infusion pending surgical intervention  Had episodes of atrial fibrillation with RVR 6/21 treated with 3 doses of metoprolol IV followed by Cardizem.  No further episodes  Heparin infusion transitioned to apixaban, ok per podiatry  Mixed hyperlipidemia  Continue home statin  Hyponatremia  Chronically 130-134  Stable  Metabolic panel and trend sodium  Pruritic condition  Continue home prednisone  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[facility])  Continue home inhaler  Gastroesophageal reflux disease without esophagitis  Continue home PPI  Severe peripheral arterial disease (HCC)  Hx R SFA stent 5/13  Home clopidogrel resumed  Anemia  Hemoglobin 7.8   Iron panel reviewed, hold off on IV iron due to infection  Received 1 UPRBC 6/18  Daily CBC and trend hemoglobin  Is s/p transfusion 6/22 in the setting of pending surgical procedure and cardiac disease, anemia, anticoagulated status. Of note, patient does have antibodies, which could lead to potential delay in obtaining PRBCs  Pressure injury of skin of sacral region  Wound care  Metabolic acidosis  Resolved  Monitor metabolic panel  Ambulatory dysfunction  RLE NWB  Moderate protein-calorie malnutrition (HCC)  Malnutrition Findings:        Nutrition following                       BMI Findings:           Body mass index is 19.26 kg/m².       VTE Pharmacologic Prophylaxis: VTE Score: 3 Moderate Risk (Score 3-4) - Pharmacological DVT Prophylaxis Ordered: apixaban (Eliquis).    Mobility:   Basic Mobility Inpatient Raw Score: 12  -HLM Goal: 4: Move to chair/commode  JH-HLM Achieved: 3: Sit at edge of bed  JH-HLM Goal NOT achieved.  Continue with multidisciplinary rounding and encourage appropriate mobility to improve upon [facility] goals.    Patient Centered Rounds: I performed bedside rounds with nursing staff today.   Discussions with Specialists or Other Care Team Provider: Podiatry, case management.    Education and Discussions with Family / Patient: Updated  (wife) at bedside.    Current Length of Stay: 7 day(s)  Current Patient Status: Inpatient   Certification Statement: The patient will continue to require additional inpatient hospital stay due to care coordination.  Discharge Plan: Anticipate discharge in 24-48 hrs to rehab facility.    Code Status: Level 1 - Full Code    Subjective   Patient says he feels a lot better.  Denies fever, chest pain, shortness of breath, abdominal pain, nausea, diaphoresis.  He has washed his hair with a shower cap, is bathing himself in bed, and asked his wife to bring his upper body weights and elastic exercise bands with intent to use them in bed.    Objective :  Temp:  [97.7 °F (36.5 °C)-99.5 °F (37.5 °C)] 98.9 °F (37.2 °C)  HR:  [] 94  BP: (114-167)/() 136/70  Resp:  [15-31] 17  SpO2:  [90 %-97 %] 93 %  O2 Device: None (Room air)    Body mass index is 19.26 kg/m².     Input and Output Summary (last 24 hours):     Intake/Output Summary (Last 24 hours) at 6/24/2025 1110  Last data filed at 6/24/2025 0841  Gross per 24 hour   Intake 1054.93 ml   Output 1000 ml   Net 54.93 ml       Physical Exam  Vitals and nursing note reviewed.   Constitutional:       Comments: Looks much better today, more alert, color is better   HENT:      Head: Normocephalic and atraumatic.      Nose: Nose normal.      Mouth/Throat:      Mouth: Mucous membranes are moist.      Pharynx: Oropharynx is clear.     Eyes:      Pupils: Pupils are equal, round, and reactive to light.       Cardiovascular:      Rate and Rhythm: Normal rate. Rhythm irregular.      Pulses: Normal pulses.   Pulmonary:      Effort:  Pulmonary effort is normal. No respiratory distress.      Breath sounds: Normal breath sounds.   Abdominal:      General: Bowel sounds are normal.      Palpations: Abdomen is soft.      Tenderness: There is no abdominal tenderness.     Musculoskeletal:      Cervical back: Neck supple.      Right lower leg: No edema.      Left lower leg: No edema.     Skin:     General: Skin is warm and dry.      Capillary Refill: Capillary refill takes less than 2 seconds.     Neurological:      General: No focal deficit present.      Mental Status: He is alert and oriented to person, place, and time.           Lines/Drains:              Lab Results: I have reviewed the following results:   Results from last 7 days   Lab Units 06/24/25  0616 06/18/25  0431 06/17/25  1112   WBC Thousand/uL 9.20   < > 17.93*   HEMOGLOBIN g/dL 9.7*   < > 8.8*   HEMATOCRIT % 30.8*   < > 28.4*   PLATELETS Thousands/uL 295   < > 361   BANDS PCT %  --   --  23*   SEGS PCT % 75   < >  --    LYMPHO PCT % 15   < > 0*   MONO PCT % 9   < > 1*   EOS PCT % 0   < > 0    < > = values in this interval not displayed.     Results from last 7 days   Lab Units 06/24/25  0616 06/18/25  1154 06/18/25  0431   SODIUM mmol/L 133*   < > 134*   POTASSIUM mmol/L 4.0   < > 3.3*   CHLORIDE mmol/L 102   < > 105   CO2 mmol/L 24   < > 20*   BUN mg/dL 7   < > 28*   CREATININE mg/dL 1.04   < > 1.39*   ANION GAP mmol/L 7   < > 9   CALCIUM mg/dL 8.6   < > 8.4   ALBUMIN g/dL  --   --  2.9*   TOTAL BILIRUBIN mg/dL  --   --  0.27   ALK PHOS U/L  --   --  43   ALT U/L  --   --  10   AST U/L  --   --  16   GLUCOSE RANDOM mg/dL 118   < > 143*    < > = values in this interval not displayed.     Results from last 7 days   Lab Units 06/18/25  0431   INR  2.68*     Results from last 7 days   Lab Units 06/24/25  0956 06/24/25  0801 06/24/25  0554 06/24/25  0416 06/24/25  0217 06/24/25  0035 06/23/25  2143 06/23/25  2008 06/23/25  1758 06/23/25  1552 06/23/25  1355 06/23/25  1205   POC GLUCOSE  mg/dl 203* 124 131 143* 171* 178* 168* 160* 111 119 118 105     Results from last 7 days   Lab Units 06/18/25  0552   HEMOGLOBIN A1C % 6.6*     Results from last 7 days   Lab Units 06/19/25  0351 06/18/25  0431 06/18/25  0008 06/17/25  2135 06/17/25  1347 06/17/25  1112   LACTIC ACID mmol/L  --   --  1.6 2.1* 3.7* 3.7*   PROCALCITONIN ng/ml 18.42* 27.46*  --   --   --  14.91*       Recent Cultures (last 7 days):   Results from last 7 days   Lab Units 06/23/25  1454 06/17/25  1112   BLOOD CULTURE   --  No Growth After 5 Days.  Staphylococcus aureus*   GRAM STAIN RESULT  No Polys or Bacteria seen  No Polys or Bacteria seen Gram positive cocci in clusters*   WOUND CULTURE  No growth  --              Last 24 Hours Medication List:     Current Facility-Administered Medications:     acetaminophen (TYLENOL) tablet 650 mg, Q6H PRN    albuterol (PROVENTIL HFA,VENTOLIN HFA) inhaler 2 puff, Q4H PRN    albuterol inhalation solution 2.5 mg, Q6H PRN    atorvastatin (LIPITOR) tablet 40 mg, QPM    Budeson-Glycopyrrol-Formoterol 160-9-4.8 MCG/ACT AERO 2 puff, Q12H SANDRA    diltiazem (CARDIZEM) 125 mg in sodium chloride 0.9 % 125 mL infusion, Titrated    diltiazem (CARDIZEM) injection 15 mg, Once    diphenhydrAMINE (BENADRYL) injection 25 mg, Q6H PRN    famotidine (PEPCID) tablet 20 mg, Daily    heparin (porcine) 25,000 units in 0.45% NaCl 250 mL infusion (premix), Titrated, Last Rate: 20 Units/kg/hr (06/24/25 0645)    heparin (porcine) injection 1,800 Units, Q6H PRN    heparin (porcine) injection 3,600 Units, Q6H PRN    HYDROmorphone HCl (DILAUDID) injection 0.2 mg, Q2H PRN    insulin regular (HumuLIN R,NovoLIN R) 1 Units/mL in sodium chloride 0.9 % 100 mL infusion, Titrated, Last Rate: 2 Units/hr (06/24/25 0591)    moisture barrier miconazole 2% cream (aka CALEB MOISTURE BARRIER ANTIFUNGAL CREAM), BID    montelukast (SINGULAIR) tablet 10 mg, HS    multivitamin-minerals (CENTRUM) tablet 1 tablet, Daily    naloxone (NARCAN) 0.04 mg/mL  syringe 0.04 mg, Q1MIN PRN    ondansetron (ZOFRAN) injection 4 mg, Q6H PRN    oxyCODONE (ROXICODONE) split tablet 2.5 mg, Q4H PRN **OR** oxyCODONE (ROXICODONE) IR tablet 5 mg, Q4H PRN    [COMPLETED] piperacillin-tazobactam (ZOSYN) 4.5 g in sodium chloride 0.9 % 100 mL IV LOADING DOSE, Once, Last Rate: 4.5 g (06/17/25 1600) **FOLLOWED BY** piperacillin-tazobactam (ZOSYN) 4.5 g in sodium chloride 0.9 % 100 mL IVPB (EXTENDED INFUSION), Q8H, Last Rate: 4.5 g (06/24/25 0957)    polyethylene glycol (MIRALAX) packet 17 g, Daily PRN    predniSONE tablet 5 mg, Daily    senna-docusate sodium (SENOKOT S) 8.6-50 mg per tablet 2 tablet, HS    vancomycin (VANCOCIN) 1,250 mg in sodium chloride 0.9 % 250 mL IVPB, Q24H, Last Rate: 1,250 mg (06/24/25 0957)    Administrative Statements   Today, Patient Was Seen By: BELGICA Thompson      **Please Note: This note may have been constructed using a voice recognition system.**

## 2025-06-24 NOTE — OCCUPATIONAL THERAPY NOTE
Occupational Therapy Evaluation     Patient Name: Gold Van  Today's Date: 6/24/2025  Problem List  Principal Problem:    Septic shock (HCC)  Active Problems:    Type 2 diabetes mellitus with complication, without long-term current use of insulin (HCC)    Essential hypertension    Mixed hyperlipidemia    Acute renal failure superimposed on stage 2 chronic kidney disease  (HCC)    Hyponatremia    Pruritic condition    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HCC)    CVA (cerebrovascular accident) (HCC)    Gastroesophageal reflux disease without esophagitis    Moderate protein-calorie malnutrition (HCC)    Severe peripheral arterial disease (HCC)    PAF (paroxysmal atrial fibrillation) (HCC)    Anemia    Pressure injury of skin of sacral region    Metabolic acidosis    Ambulatory dysfunction    Past Medical History  Past Medical History[1]  Past Surgical History  Past Surgical History[2]        06/24/25 0841   OT Last Visit   OT Visit Date 06/24/25   Note Type   Note type Evaluation   Additional Comments Pt seen as a co-eval with PT due to the patient's co-morbidities, clinically unstable presentation, and present impairments which are a regression from the patient's baseline.   Pain Assessment   Pain Assessment Tool 0-10   Pain Score 5   Pain Location/Orientation Orientation: Right;Location: Foot   Pain Radiating Towards n/a   Pain Onset/Description Onset: Ongoing   Effect of Pain on Daily Activities mobility   Patient's Stated Pain Goal No pain   Hospital Pain Intervention(s) Medication (See MAR);Repositioned   Multiple Pain Sites No   Restrictions/Precautions   Weight Bearing Precautions Per Order Yes   RLE Weight Bearing Per Order NWB   Other Precautions Chair Alarm;Bed Alarm;WBS;Multiple lines;Telemetry;Fall Risk;Pain  (decreased skin integrity)   Home Living   Type of Home House   Home Layout Two level;Performs ADLs on one level;Able to live on main level with bedroom/bathroom;Bed/bath upstairs  (13 steps to main living  "floor; anticipate staying in first floor living room upon discharge)   Bathroom Shower/Tub Walk-in shower   Bathroom Toilet Raised   Bathroom Equipment Shower chair;Grab bars in shower;Grab bars around toilet   Bathroom Accessibility Accessible   Home Equipment Cane;Walker  (Pt reports both RW and SPC used at baseline)   Prior Function   Level of [place] Independent with ADLs;Independent with functional mobility;Needs assistance with IADLS   Lives With Spouse   Receives Help From Family   IADLs Independent with driving;Family/Friend/Other provides meals;Family/Friend/Other provides medication management   Falls in the last 6 months 0  (pt denies)   Vocational Retired   Lifestyle   Autonomy Pt resides in two level house w/ spouse; I with ADLs, mobility and IADLs at baseline; +    Reciprocal Relationships supportive family   Service to Others retired -    Subjective   Subjective \"I didn't think you girls would be coming to see me today\"   ADL   Where Assessed Edge of bed   Eating Assistance 6  Modified independent   Grooming Assistance 6  Modified Independent   UB Bathing Assistance 5  Supervision/Setup   LB Bathing Assistance 3  Moderate Assistance   UB Dressing Assistance 5  Supervision/Setup   LB Dressing Assistance 2  Maximal Assistance   LB Dressing Deficit Don/doff L sock   Toileting Assistance  3  Moderate Assistance   Additional Comments Given functional performance skills + medical complexity, therapist suspects via clinical judgement + skilled analysis; pt currently requires stated assist above to perform each area of ADLs d/t limitations including: functional mobility, functional activity tolerance, coordination, functional transfers, sitting/standing balance, decreased equilibrium reactions, decreased forward flexion, pain and overall problem-solving deficits   Bed Mobility   Supine to Sit 4  Minimal assistance   Additional items Assist x 1;HOB elevated;Bedrails;Increased time " required;Verbal cues   Sit to Supine 3  Moderate assistance   Additional items Assist x 2;Bedrails;Increased time required;Verbal cues;LE management   Additional Comments VC for bedrail utilization and proper body mechanics; + dizziness with transitional movements but with sympomatic resolution with increased rest break   Transfers   Sit to Stand 3  Moderate assistance   Additional items Assist x 2;Increased time required;Verbal cues   Stand to Sit 3  Moderate assistance   Additional items Assist x 2;Increased time required;Verbal cues   Additional Comments RW used; VC for safe hand placement, proper body mechanics and overall RW management during directional turns. Pt tolerated stance for ~30 seconds., with significant posterior lean,  with mod A x2 for postural control and support. Unable to advance further d/t pain, decreased balance and overall HR fluctuations between 110-160bpm   Balance   Static Sitting Fair +   Dynamic Sitting Fair   Static Standing Poor +   Dynamic Standing Poor   Activity Tolerance   Activity Tolerance Patient limited by fatigue;Patient limited by pain;Other (Comment)  (& NWB status)   Medical Staff Made Aware Yes, CM made aware of d/c recs   Nurse Made Aware Yes, nursing staff made aware of session outcomes   RUE Assessment   RUE Assessment WFL   RUE Strength   RUE Overall Strength   (4-/5)   LUE Assessment   LUE Assessment WFL   LUE Strength   LUE Overall Strength   (4-/5)   Hand Function   Gross Motor Coordination Functional   Fine Motor Coordination Functional   Sensation   Additional Comments + RLE numbness   Vision-Basic Assessment   Current Vision Wears glasses all the time   Psychosocial   Psychosocial (WDL) WDL   Cognition   Overall Cognitive Status WFL   Arousal/Participation Alert;Responsive;Cooperative   Attention Within functional limits   Orientation Level Oriented X4   Memory Decreased recall of precautions   Following Commands Follows one step commands without difficulty    Comments Pt agreeable to OT evaluation, pleasant   Assessment   Limitation Decreased ADL status;Decreased UE strength;Decreased Safe judgement during ADL;Decreased endurance;Decreased self-care trans;Decreased high-level ADLs   Prognosis Good   Assessment Pt is a 79 y.o. male seen for OT evaluation s/p admit to [facility] on [facility]17[facility]25 w/ Septic shock (HCC).  Comorbidities affecting pt's functional performance at time of assessment include: type 2 diabetes, HTN, mixed hyperlipidemia, COPD, CVA hx, GERD, PAF, anemia, ambulatory dysfunction, neuropathy, left drop foot. Personal factors affecting pt at time of IE include:steps to enter environment, difficulty performing ADLS, difficulty performing IADLS , decreased initiation and engagement , health management , and environment. OT order placed to assess Pt's ADLs, cognitive status, and performance during functional tasks in order to maximize safety and independence while making most appropriate d/c recommendations. Prior to admission, pt was I with ADLs, mobility and IADLs. Upon evaluation: the following deficits impact occupational performance: weakness, decreased strength, decreased balance, decreased tolerance, impaired sequencing, impaired problem solving, decreased safety awareness, and increased pain. Pt's clinical presentation is currently evolving given new onset deficits that effect Pt's occupational performance and ability to safely return to OF including decrease activity tolerance, decrease standing balance, decrease sitting balance, decrease performance during ADL tasks, decrease safety awareness , increased pain, decrease generalized strength, decrease activity engagement, decrease performance during functional transfers, steps to enter home, and high fall risk combined with medical complications of hypertension , pain impacting overall mobility status, abnormal CBC, edema/swelling, fever, wounds, decreased skin integrity, multiple readmissions, and  need for input for mobility technique/safety.  Pt to benefit from continued skilled OT tx while in the hospital to address deficits as defined above and maximize level of functional independence w ADL's and functional mobility. Occupational Performance areas to address include: grooming, bathing/shower, toilet hygiene, dressing, functional mobility, community mobility, and clothing management. From OT standpoint, recommendation at time of d/c would with max intensity OT resources.   Goals   Patient Goals to have less pain   Plan   Treatment Interventions ADL retraining;Functional transfer training;UE strengthening/ROM;Endurance training;Patient/family training;Compensatory technique education;Energy conservation;Activityengagement   Goal Expiration Date 07/04/25   OT Treatment Day 0   OT Frequency 2-3x/wk   Discharge Recommendation   Rehab Resource Intensity Level, OT I (Maximum Resource Intensity)   Additional Comments  The patient's raw score on the AM-PAC Daily Activity inpatient short form is 16, standardized score is 35.96, less than 39.4. Patients at this level are likely to benefit from discharge with max intensity OT resources. Please refer to the recommendation of the Occupational Therapist for safe discharge planning.   AM-PAC Daily Activity Inpatient   Lower Body Dressing 2   Bathing 2   Toileting 2   Upper Body Dressing 3   Grooming 3   Eating 4   Daily Activity Raw Score 16   Daily Activity Standardized Score (Calc for Raw Score >=11) 35.96   AM-PAC Applied Cognition Inpatient   Following a Speech/Presentation 3   Understanding Ordinary Conversation 4   Taking Medications 4   Remembering Where Things Are Placed or Put Away 3   Remembering List of 4-5 Errands 3   Taking Care of Complicated Tasks 3   Applied Cognition Raw Score 20   Applied Cognition Standardized Score 41.76     GOALS:    Pt will achieve the following within specified time frame: STG  Pt will achieve the following goals within 5  days    *ADL transfers with Mod (A) for inc'd independence with ADLs/purposeful tasks    *UB ADL with (I) for inc'd independence with self cares    *LB ADL with Mod (A) using AE prn for inc'd independence with self cares    *Toileting with Mod (A) for clothing management and hygiene for return to PLOF with personal care    *Increase static stand balance to F and dyn stand balance to F- for inc'd safety with standing purposeful tasks    *Increase stand tolerance x6 m for inc'd tolerance with standing purposeful tasks    *Participate in 10m UE therex to increase overall stamina/activity tolerance for purposeful tasks    *Bed mobility- Min (A) for inc'd independence to manage own comfort and initiate EOB & OOB purposeful tasks    Pt will achieve the following within specified time frame: LTG  Pt will achieve the following goals within 10 days    *ADL transfers with Min (A) for inc'd independence with ADLs/purposeful tasks    *UB ADL with (I) for inc'd independence with self cares    *LB ADL with Min (A) using AE prn for inc'd independence with self cares    *Toileting with Min (A) for clothing management and hygiene for return to PLOF with personal care    *Increase static stand balance to F+ and dyn stand balance to F for inc'd safety with standing purposeful tasks    *Increase stand tolerance x7 m for inc'd tolerance with standing purposeful tasks    *Bed mobility- (I) for inc'd independence to manage own comfort and initiate EOB & OOB purposeful tasks    *Participate in 10-15m UE therex to increase overall stamina/activity tolerance for purposeful tasks      Marcella Redd, MS, OTR/L              [1]   Past Medical History:  Diagnosis Date    Asthma     COPD (chronic obstructive pulmonary disease) ([facility])     COVID-19     CVA (cerebral vascular accident) ([facility])     Diabetes mellitus ([facility])     Environmental allergies     Hyperlipidemia     Hypertension     Macular degeneration 07/13/2020    right eye    Orthostatic  hypotension     Osteopenia     Pneumonia     Pneumothorax     Sinusitis    [2]   Past Surgical History:  Procedure Laterality Date    CARPAL TUNNEL RELEASE Left 08/2024    CATARACT EXTRACTION Left 07/2024    COLONOSCOPY      KNEE CARTILAGE SURGERY Right 1964    IN AMPUTATION METATARSAL W/TOE SINGLE Right 05/16/2025    Procedure: RAY RESECTION FOOT - R partial 5th ray resection;  Surgeon: Reinaldo Brewer DPM;  Location: [facility];  Service: Podiatry    IN AMPUTATION METATARSAL W/TOE SINGLE Right 05/23/2025    Procedure: Right partial 4th ray amputation, wound debridement;  Surgeon: Brandon Phillips DPM;  Location: [facility];  Service: Podiatry    IN TEAEC W/WO PATCH GRAFT COMMON FEMORAL Right 05/13/2025    Procedure: Right common femoral endarterectomy with bovine pericardial patch Right lower extremity angiogram Third order cannulation of the right anterior tibial artery Balloon angioplasty of the right anterior tibial with a 3x220 Fernando balloon DCB angioplasty of the SFA with a 6x150 Jessup balloon Stenting of the right SFA with two 6x150 Grazyna stents, 6x5cm Viabahn, and a 7x5cm viabahn Post-di    VASECTOMY      WISDOM TOOTH EXTRACTION      x4

## 2025-06-24 NOTE — PLAN OF CARE
Problem: PHYSICAL THERAPY ADULT  Goal: Performs mobility at highest level of function for planned discharge setting.  See evaluation for individualized goals.  Description: Treatment/Interventions: Functional transfer training, Therapeutic exercise, Endurance training, Gait training, Bed mobility, Equipment eval/education, Elevations  Equipment Recommended:  (TBD)       See flowsheet documentation for full assessment, interventions and recommendations.  Outcome: Progressing  Note: Prognosis: Fair     Assessment: Pt is 79 y.o. male seen for PT evaluation s/p admit to [facility] on 6/17/2025 w/ Septic shock (HCC). PT consulted to assess pt's functional mobility and d/c needs. Order placed for PT eval and tx, w/ [facility] RLE order. Pt agreeable to PT  session upon arrival, pt found supine in bed.  PTA, pt was independent w/ all functional mobility w/ SPC and RW, ambulates community distances and elevations, has 1 NEERU, lives w/ spouse in 2 level home, and retired.  Pt to benefit from continued PT tx to address deficits and maximize level of functional independent mobility and consistency. Upon conclusion pt  supine in bed. Complexity: Comorbidities affecting pt's physical performance at time of assessment include: DM, CVA, CKD, and malnutrition, GERDA, anemia, R foot ulcer and septic shock . Personal factors affecting pt at time of IE include: limited mobility, lives in multistory home, steps to enter home, and inability to ambulate household distances. Please find objective findings from PT assessment regarding body systems outlined above with impairments and limitations including weakness, impaired balance, decreased endurance, pain, decreased activity tolerance, decreased functional mobility tolerance, altered sensation, fall risk, orthopedic restrictions, and decreased skin integrity.  Pt's clinical presentation is currently unstable/unpredictable seen in pt's presentation of abnormal H&H, abnormal sodium levels,  abnormal blood sugar levels, pain, recent surgery, polypharmacy, wounds, telemetry use, step down status, and decline in mobility status. The patient's AM-PAC Basic Mobility Inpatient Short Form Raw Score is 10.  Based on patient presentations and impairments, pt would most appropriately benefit from Level 1 resource intensity upon discharge. A Raw score of less than or equal to 16 suggests the patient may benefit from discharge to post-acute rehabilitation services. Please also refer to the recommendation of the Physical Therapist for safe discharge planning. RN verbalized pt appropriate for PT session. Pt seen as a co-eval with OT due to the patient's co-morbidities, clinically unstable presentation, and present impairments which are a regression from the patient's baseline.  Barriers to Discharge: Inaccessible home environment     Rehab Resource Intensity Level, PT: I (Maximum Resource Intensity)    See flowsheet documentation for full assessment.

## 2025-06-24 NOTE — PLAN OF CARE
Problem: Prexisting or High Potential for Compromised Skin Integrity  Goal: Skin integrity is maintained or improved  Description: INTERVENTIONS:  - Identify patients at risk for skin breakdown  - Assess and monitor skin integrity including under and around medical devices   - Assess and monitor nutrition and hydration status  - Monitor labs  - Assess for incontinence   - Turn and reposition patient  - Assist with mobility/ambulation  - Relieve pressure over joycelyn prominences   - Avoid friction and shearing  - Provide appropriate hygiene as needed including keeping skin clean and dry  - Evaluate need for skin moisturizer/barrier cream  - Collaborate with interdisciplinary team  - Patient/family teaching  - Consider wound care consult    Assess:  - Review Sae scale daily  - Clean and moisturize skin every shift  - Inspect skin when repositioning, toileting, and assisting with ADLS  - Assess under medical devices   - Assess extremities for adequate circulation and sensation     Bed Management:  - Have minimal linens on bed & keep smooth, unwrinkled  - Change linens as needed when moist or perspiring  - Avoid sitting or lying in one position for more than 2 hours while in bed?Keep HOB at 30 degrees   - Toileting:  - Offer bedside commode  - Assess for incontinence   - Use incontinent care products after each incontinent episode     Activity:  - Mobilize patient 3 times a day  - Encourage activity and walks on unit  - Encourage or provide ROM exercises   - Turn and reposition patient every 2 Hours  - Use appropriate equipment to lift or move patient in bed  - Instruct/ Assist with weight shifting every 60 when out of bed in chair  - Consider limitation of chair time 2 hour intervals    Skin Care:  - Avoid use of baby powder, tape, friction and shearing, hot water or constrictive clothing  - Relieve pressure over bony prominences using mepelex  - Do not massage red bony areas    Next Steps:  - Teach patient  strategies to minimize risks  - Consider consults to  interdisciplinary teams   Outcome: Progressing     Problem: PAIN - ADULT  Goal: Verbalizes/displays adequate comfort level or baseline comfort level  Description: Interventions:  - Encourage patient to monitor pain and request assistance  - Assess pain using appropriate pain scale  - Administer analgesics as ordered based on type and severity of pain and evaluate response  - Implement non-pharmacological measures as appropriate and evaluate response  - Consider cultural and social influences on pain and pain management  - Notify physician/advanced practitioner if interventions unsuccessful or patient reports new pain  - Educate patient/family on pain management process including their role and importance of  reporting pain   - Provide non-pharmacologic/complimentary pain relief interventions  Outcome: Progressing     Problem: INFECTION - ADULT  Goal: Absence or prevention of progression during hospitalization  Description: INTERVENTIONS:  - Assess and monitor for signs and symptoms of infection  - Monitor lab/diagnostic results  - Monitor all insertion sites, i.e. indwelling lines, tubes, and drains  - Monitor endotracheal if appropriate and nasal secretions for changes in amount and color  - Clive appropriate cooling/warming therapies per order  - Administer medications as ordered  - Instruct and encourage patient and family to use good hand hygiene technique  - Identify and instruct in appropriate isolation precautions for identified infection/condition  Outcome: Progressing  Goal: Absence of fever/infection during neutropenic period  Description: INTERVENTIONS:  - Monitor WBC  - Perform strict hand hygiene  - Limit to healthy visitors only  - No plants, dried, fresh or silk flowers with otoole in patient room  Outcome: Progressing     Problem: SAFETY ADULT  Goal: Patient will remain free of falls  Description: INTERVENTIONS:  - Educate patient/family on  patient safety including physical limitations  - Instruct patient to call for assistance with activity   - Consider consulting OT/PT to assist with strengthening/mobility based on AM PAC & JH-HLM score  - Consult OT/PT to assist with strengthening/mobility   - Keep Call bell within reach  - Keep bed low and locked with side rails adjusted as appropriate  - Keep care items and personal belongings within reach  - Initiate and maintain comfort rounds  - Make Fall Risk Sign visible to staff  - Offer Toileting every 2 Hours, in advance of need  - Initiate/Maintain bed alarm  - Obtain necessary fall risk management equipment: yellow socks and bracelet  - Apply yellow socks and bracelet for high fall risk patients  - Consider moving patient to room near nurses station  Outcome: Progressing  Goal: Maintain or return to baseline ADL function  Description: INTERVENTIONS:  -  Assess patient's ability to carry out ADLs; assess patient's baseline for ADL function and identify physical deficits which impact ability to perform ADLs (bathing, care of mouth/teeth, toileting, grooming, dressing, etc.)  - Assess/evaluate cause of self-care deficits   - Assess range of motion  - Assess patient's mobility; develop plan if impaired  - Assess patient's need for assistive devices and provide as appropriate  - Encourage maximum independence but intervene and supervise when necessary  - Involve family in performance of ADLs  - Assess for home care needs following discharge   - Consider OT consult to assist with ADL evaluation and planning for discharge  - Provide patient education as appropriate  - Monitor functional capacity and physical performance, use of AM PAC & JH-HLM   - Monitor gait, balance and fatigue with ambulation    Outcome: Progressing  Goal: Maintains/Returns to pre admission functional level  Description: INTERVENTIONS:  - Perform AM-PAC 6 Click Basic Mobility/ Daily Activity assessment daily.  - Set and communicate daily  mobility goal to care team and patient/family/caregiver.   - Collaborate with rehabilitation services on mobility goals if consulted  - Perform Range of Motion 3 times a day.  - Reposition patient every 2 hours.  - Dangle patient 3 times a day  - Stand patient 3 times a day  - Ambulate patient 3 times a day  - Out of bed to chair 3 times a day   - Out of bed for meals 3 times a day  - Out of bed for toileting  - Record patient progress and toleration of activity level   Outcome: Progressing     Problem: DISCHARGE PLANNING  Goal: Discharge to home or other facility with appropriate resources  Description: INTERVENTIONS:  - Identify barriers to discharge w/patient and caregiver  - Arrange for needed discharge resources and transportation as appropriate  - Identify discharge learning needs (meds, wound care, etc.)  - Arrange for interpretive services to assist at discharge as needed  - Refer to Case Management Department for coordinating discharge planning if the patient needs post-hospital services based on physician/advanced practitioner order or complex needs related to functional status, cognitive ability, or social support system  Outcome: Progressing     Problem: Knowledge Deficit  Goal: Patient/family/caregiver demonstrates understanding of disease process, treatment plan, medications, and discharge instructions  Description: Complete learning assessment and assess knowledge base.  Interventions:  - Provide teaching at level of understanding  - Provide teaching via preferred learning methods  Outcome: Progressing     Problem: Nutrition/Hydration-ADULT  Goal: Nutrient/Hydration intake appropriate for improving, restoring or maintaining nutritional needs  Description: Monitor and assess patient's nutrition/hydration status for malnutrition. Collaborate with interdisciplinary team and initiate plan and interventions as ordered.  Monitor patient's weight and dietary intake as ordered or per policy. Utilize nutrition  screening tool and intervene as necessary. Determine patient's food preferences and provide high-protein, high-caloric foods as appropriate.     INTERVENTIONS:  - Monitor oral intake, urinary output, labs, and treatment plans  - Assess nutrition and hydration status and recommend course of action  - Evaluate amount of meals eaten  - Assist patient with eating if necessary   - Allow adequate time for meals  - Recommend/ encourage appropriate diets, oral nutritional supplements, and vitamin/mineral supplements  - Order, calculate, and assess calorie counts as needed  - Recommend, monitor, and adjust tube feedings and TPN/PPN based on assessed needs  - Assess need for intravenous fluids  - Provide specific nutrition/hydration education as appropriate  - Include patient/family/caregiver in decisions related to nutrition  Outcome: Progressing     Problem: CARDIOVASCULAR - ADULT  Goal: Maintains optimal cardiac output and hemodynamic stability  Description: INTERVENTIONS:  - Monitor I/O, vital signs and rhythm  - Monitor for S/S and trends of decreased cardiac output  - Administer and titrate ordered vasoactive medications to optimize hemodynamic stability  - Assess quality of pulses, skin color and temperature  - Assess for signs of decreased coronary artery perfusion  - Instruct patient to report change in severity of symptoms  Outcome: Progressing  Goal: Absence of cardiac dysrhythmias or at baseline rhythm  Description: INTERVENTIONS:  - Continuous cardiac monitoring, vital signs, obtain 12 lead EKG if ordered  - Administer antiarrhythmic and heart rate control medications as ordered  - Monitor electrolytes and administer replacement therapy as ordered  Outcome: Progressing     Problem: RESPIRATORY - ADULT  Goal: Achieves optimal ventilation and oxygenation  Description: INTERVENTIONS:  - Assess for changes in respiratory status  - Assess for changes in mentation and behavior  - Position to facilitate oxygenation and  minimize respiratory effort  - Oxygen administered by appropriate delivery if ordered  - Initiate smoking cessation education as indicated  - Encourage broncho-pulmonary hygiene including cough, deep breathe, Incentive Spirometry  - Assess the need for suctioning and aspirate as needed  - Assess and instruct to report SOB or any respiratory difficulty  - Respiratory Therapy support as indicated  Outcome: Progressing     Problem: GASTROINTESTINAL - ADULT  Goal: Minimal or absence of nausea and/or vomiting  Description: INTERVENTIONS:  - Administer IV fluids if ordered to ensure adequate hydration  - Maintain NPO status until nausea and vomiting are resolved  - Nasogastric tube if ordered  - Administer ordered antiemetic medications as needed  - Provide nonpharmacologic comfort measures as appropriate  - Advance diet as tolerated, if ordered  - Consider nutrition services referral to assist patient with adequate nutrition and appropriate food choices  Outcome: Progressing  Goal: Maintains or returns to baseline bowel function  Description: INTERVENTIONS:  - Assess bowel function  - Encourage oral fluids to ensure adequate hydration  - Administer IV fluids if ordered to ensure adequate hydration  - Administer ordered medications as needed  - Encourage mobilization and activity  - Consider nutritional services referral to assist patient with adequate nutrition and appropriate food choices  Outcome: Progressing  Goal: Maintains adequate nutritional intake  Description: INTERVENTIONS:  - Monitor percentage of each meal consumed  - Identify factors contributing to decreased intake, treat as appropriate  - Assist with meals as needed  - Monitor I&O, weight, and lab values if indicated  - Obtain nutrition services referral as needed  Outcome: Progressing  Goal: Establish and maintain optimal ostomy function  Description: INTERVENTIONS:  - Assess bowel function  - Encourage oral fluids to ensure adequate hydration  -  Administer IV fluids if ordered to ensure adequate hydration   - Administer ordered medications as needed  - Encourage mobilization and activity  - Nutrition services referral to assist patient with appropriate food choices  - Assess stoma site  - Consider wound care consult   Outcome: Progressing  Goal: Oral mucous membranes remain intact  Description: INTERVENTIONS  - Assess oral mucosa and hygiene practices  - Implement preventative oral hygiene regimen  - Implement oral medicated treatments as ordered  - Initiate Nutrition services referral as needed  Outcome: Progressing     Problem: GENITOURINARY - ADULT  Goal: Maintains or returns to baseline urinary function  Description: INTERVENTIONS:  - Assess urinary function  - Encourage oral fluids to ensure adequate hydration if ordered  - Administer IV fluids as ordered to ensure adequate hydration  - Administer ordered medications as needed  - Offer frequent toileting  - Follow urinary retention protocol if ordered  Outcome: Progressing  Goal: Absence of urinary retention  Description: INTERVENTIONS:  - Assess patient’s ability to void and empty bladder  - Monitor I/O  - Bladder scan as needed  - Discuss with physician/AP medications to alleviate retention as needed  - Discuss catheterization for long term situations as appropriate  Outcome: Progressing  Goal: Urinary catheter remains patent  Description: INTERVENTIONS:  - Assess patency of urinary catheter  - If patient has a chronic schmitt, consider changing catheter if non-functioning  - Follow guidelines for intermittent irrigation of non-functioning urinary catheter  Outcome: Progressing     Problem: METABOLIC, FLUID AND ELECTROLYTES - ADULT  Goal: Electrolytes maintained within normal limits  Description: INTERVENTIONS:  - Monitor labs and assess patient for signs and symptoms of electrolyte imbalances  - Administer electrolyte replacement as ordered  - Monitor response to electrolyte replacements, including  repeat lab results as appropriate  - Instruct patient on fluid and nutrition as appropriate  Outcome: Progressing  Goal: Fluid balance maintained  Description: INTERVENTIONS:  - Monitor labs   - Monitor I/O and WT  - Instruct patient on fluid and nutrition as appropriate  - Assess for signs & symptoms of volume excess or deficit  Outcome: Progressing  Goal: Glucose maintained within target range  Description: INTERVENTIONS:  - Monitor Blood Glucose as ordered  - Assess for signs and symptoms of hyperglycemia and hypoglycemia  - Administer ordered medications to maintain glucose within target range  - Assess nutritional intake and initiate nutrition service referral as needed  Outcome: Progressing     Problem: SKIN/TISSUE INTEGRITY - ADULT  Goal: Skin Integrity remains intact(Skin Breakdown Prevention)  Description: Assess:  -Perform Sae assessment every shift  -Clean and moisturize skin every day  -Inspect skin when repositioning, toileting, and assisting with ADLS  -Assess under medical devices such as vitals equipment every shift  -Assess extremities for adequate circulation and sensation     Bed Management:  -Have minimal linens on bed & keep smooth, unwrinkled  -Change linens as needed when moist or perspiring  -Avoid sitting or lying in one position for more than 2 hours while in bed  -Keep HOB at 30 degrees or higher     Toileting:  -Offer bedside commode  -Assess for incontinence every 2 hours  -Use incontinent care products after each incontinent episode such as cleansing wipes and barrier cream    Activity:  -Mobilize patient 3 times a day when able  -Encourage activity and walks on unit  -Encourage or provide ROM exercises   -Turn and reposition patient every 2 Hours  -Use appropriate equipment to lift or move patient in bed  -Instruct/ Assist with weight shifting every 2 hours when out of bed in chair  -Consider limitation of chair time 2 hour intervals    Skin Care:  -Avoid use of baby powder, tape,  friction and shearing, hot water or constrictive clothing  -Relieve pressure over bony prominences using positioning wedges   -Do not massage red bony areas    Next Steps:  -Teach patient strategies to minimize risks such as weight shifts   -Consider consults to  interdisciplinary teams such as pt, ot, wound  Outcome: Progressing  Goal: Incision(s), wounds(s) or drain site(s) healing without S/S of infection  Description: INTERVENTIONS  - Assess and document dressing, incision, wound bed, drain sites and surrounding tissue  - Provide patient and family education  - Perform skin care/dressing changes every day or as ordered   Outcome: Progressing  Goal: Pressure injury heals and does not worsen  Description: Interventions:  - Implement low air loss mattress or specialty surface (Criteria met)  - Apply silicone foam dressing  - Instruct/assist with weight shifting every 60 minutes when in chair   - Limit chair time to 2 hour intervals  - Use special pressure reducing interventions such as waffle cushion when in chair   - Apply fecal or urinary incontinence containment device   - Perform passive or active ROM every shift  - Turn and reposition patient & offload bony prominences every 2 hours   - Utilize friction reducing device or surface for transfers   - Consider consults to  interdisciplinary teams such as wound care, pt, ot   - Use incontinent care products after each incontinent episode such as barrier cream  - Consider nutrition services referral as needed  Outcome: Progressing     Problem: Potential for Falls  Goal: Patient will remain free of falls  Description: INTERVENTIONS:  - Educate patient/family on patient safety including physical limitations  - Instruct patient to call for assistance with activity   - Consider consulting OT/PT to assist with strengthening/mobility based on AM PAC & JH-HLM score  - Consult OT/PT to assist with strengthening/mobility   - Keep Call bell within reach  - Keep bed low and  locked with side rails adjusted as appropriate  - Keep care items and personal belongings within reach  - Initiate and maintain comfort rounds  - Make Fall Risk Sign visible to staff  - Offer Toileting every 2 Hours, in advance of need  - Initiate/Maintain bed alarm  - Obtain necessary fall risk management equipment: yellow socks and bracelet   - Apply yellow socks and bracelet for high fall risk patients  - Consider moving patient to room near nurses station  Outcome: Progressing

## 2025-06-24 NOTE — PROGRESS NOTES
Gold Van is a 79 y.o. male who is currently ordered Vancomycin IV with management by the Pharmacy Consult service.  Relevant clinical data and objective / subjective history reviewed.  Vancomycin Assessment:  Indication and Goal AUC/Trough: Bone/joint infection (goal -600, trough >10), -600, trough >10  Clinical Status: stable  Micro:     Renal Function:  SCr: 1.04 mg/dL  CrCl: 51.9 mL/min  Renal replacement: Not on dialysis  Days of Therapy: 8  Current Dose: 750mg IV q12h  Vancomycin Plan:  New Dosinmg IV q24h  Estimated AUC: 535 mcg*hr/mL  Estimated Trough: 13.5 mcg/mL  Next Level: 6/30 AM Labs  Renal Function Monitoring: Daily BMP and UOP  Pharmacy will continue to follow closely for s/sx of nephrotoxicity, infusion reactions and appropriateness of therapy.  BMP and CBC will be ordered per protocol. We will continue to follow the patient’s culture results and clinical progress daily.    Maged Davila, Pharmacist

## 2025-06-24 NOTE — ASSESSMENT & PLAN NOTE
Malnutrition Findings:        Nutrition following                       BMI Findings:           Body mass index is 19.26 kg/m².

## 2025-06-24 NOTE — ASSESSMENT & PLAN NOTE
Lab Results   Component Value Date    EGFR 67 06/24/2025    EGFR 84 06/23/2025    EGFR 86 06/22/2025    CREATININE 1.04 06/24/2025    CREATININE 0.81 06/23/2025    CREATININE 0.77 06/22/2025     POA with creatinine 1.49  Creatinine is now around baseline of 0.9-1.0  Likely prerenal in setting of shock state  Daily metabolic panel and trend creatinine

## 2025-06-25 LAB
ANION GAP SERPL CALCULATED.3IONS-SCNC: 6 MMOL/L (ref 4–13)
BACTERIA SPEC ANAEROBE CULT: NORMAL
BACTERIA SPEC ANAEROBE CULT: NORMAL
BASOPHILS # BLD AUTO: 0.03 THOUSANDS/ÂΜL (ref 0–0.1)
BASOPHILS NFR BLD AUTO: 0 % (ref 0–1)
BUN SERPL-MCNC: 12 MG/DL (ref 5–25)
CALCIUM SERPL-MCNC: 8.5 MG/DL (ref 8.4–10.2)
CHLORIDE SERPL-SCNC: 101 MMOL/L (ref 96–108)
CO2 SERPL-SCNC: 25 MMOL/L (ref 21–32)
CREAT SERPL-MCNC: 0.87 MG/DL (ref 0.6–1.3)
EOSINOPHIL # BLD AUTO: 0.08 THOUSAND/ÂΜL (ref 0–0.61)
EOSINOPHIL NFR BLD AUTO: 1 % (ref 0–6)
ERYTHROCYTE [DISTWIDTH] IN BLOOD BY AUTOMATED COUNT: 15.4 % (ref 11.6–15.1)
GFR SERPL CREATININE-BSD FRML MDRD: 82 ML/MIN/1.73SQ M
GLUCOSE SERPL-MCNC: 169 MG/DL (ref 65–140)
GLUCOSE SERPL-MCNC: 190 MG/DL (ref 65–140)
GLUCOSE SERPL-MCNC: 252 MG/DL (ref 65–140)
GLUCOSE SERPL-MCNC: 290 MG/DL (ref 65–140)
GLUCOSE SERPL-MCNC: 325 MG/DL (ref 65–140)
HCT VFR BLD AUTO: 29.8 % (ref 36.5–49.3)
HGB BLD-MCNC: 9.6 G/DL (ref 12–17)
IMM GRANULOCYTES # BLD AUTO: 0.07 THOUSAND/UL (ref 0–0.2)
IMM GRANULOCYTES NFR BLD AUTO: 1 % (ref 0–2)
LYMPHOCYTES # BLD AUTO: 1.22 THOUSANDS/ÂΜL (ref 0.6–4.47)
LYMPHOCYTES NFR BLD AUTO: 17 % (ref 14–44)
MCH RBC QN AUTO: 28.2 PG (ref 26.8–34.3)
MCHC RBC AUTO-ENTMCNC: 32.2 G/DL (ref 31.4–37.4)
MCV RBC AUTO: 88 FL (ref 82–98)
MONOCYTES # BLD AUTO: 0.53 THOUSAND/ÂΜL (ref 0.17–1.22)
MONOCYTES NFR BLD AUTO: 7 % (ref 4–12)
NEUTROPHILS # BLD AUTO: 5.3 THOUSANDS/ÂΜL (ref 1.85–7.62)
NEUTS SEG NFR BLD AUTO: 74 % (ref 43–75)
NRBC BLD AUTO-RTO: 0 /100 WBCS
PLATELET # BLD AUTO: 273 THOUSANDS/UL (ref 149–390)
PMV BLD AUTO: 9.6 FL (ref 8.9–12.7)
POTASSIUM SERPL-SCNC: 3.7 MMOL/L (ref 3.5–5.3)
RBC # BLD AUTO: 3.4 MILLION/UL (ref 3.88–5.62)
SODIUM SERPL-SCNC: 132 MMOL/L (ref 135–147)
WBC # BLD AUTO: 7.23 THOUSAND/UL (ref 4.31–10.16)

## 2025-06-25 PROCEDURE — 85025 COMPLETE CBC W/AUTO DIFF WBC: CPT | Performed by: NURSE PRACTITIONER

## 2025-06-25 PROCEDURE — 82948 REAGENT STRIP/BLOOD GLUCOSE: CPT

## 2025-06-25 PROCEDURE — 80048 BASIC METABOLIC PNL TOTAL CA: CPT | Performed by: NURSE PRACTITIONER

## 2025-06-25 PROCEDURE — 99232 SBSQ HOSP IP/OBS MODERATE 35: CPT

## 2025-06-25 RX ORDER — LISINOPRIL 5 MG/1
5 TABLET ORAL DAILY
Status: DISCONTINUED | OUTPATIENT
Start: 2025-06-25 | End: 2025-07-05 | Stop reason: HOSPADM

## 2025-06-25 RX ORDER — ATENOLOL 25 MG/1
25 TABLET ORAL DAILY
Status: DISCONTINUED | OUTPATIENT
Start: 2025-06-25 | End: 2025-07-05 | Stop reason: HOSPADM

## 2025-06-25 RX ADMIN — MONTELUKAST 10 MG: 10 TABLET, FILM COATED ORAL at 21:19

## 2025-06-25 RX ADMIN — SENNOSIDES AND DOCUSATE SODIUM 2 TABLET: 50; 8.6 TABLET ORAL at 21:19

## 2025-06-25 RX ADMIN — CLOPIDOGREL 75 MG: 75 TABLET ORAL at 08:46

## 2025-06-25 RX ADMIN — INSULIN LISPRO 2 UNITS: 100 INJECTION, SOLUTION INTRAVENOUS; SUBCUTANEOUS at 11:35

## 2025-06-25 RX ADMIN — Medication 4.5 G: at 17:24

## 2025-06-25 RX ADMIN — ATORVASTATIN CALCIUM 40 MG: 40 TABLET, FILM COATED ORAL at 17:23

## 2025-06-25 RX ADMIN — MICONAZOLE NITRATE: 20 CREAM TOPICAL at 17:25

## 2025-06-25 RX ADMIN — LISINOPRIL 5 MG: 5 TABLET ORAL at 16:19

## 2025-06-25 RX ADMIN — BUDESONIDE, GLYCOPYRROLATE, AND FORMOTEROL FUMARATE 2 PUFF: 160; 9; 4.8 AEROSOL, METERED RESPIRATORY (INHALATION) at 20:44

## 2025-06-25 RX ADMIN — APIXABAN 5 MG: 5 TABLET, FILM COATED ORAL at 17:23

## 2025-06-25 RX ADMIN — APIXABAN 5 MG: 5 TABLET, FILM COATED ORAL at 08:46

## 2025-06-25 RX ADMIN — BUDESONIDE, GLYCOPYRROLATE, AND FORMOTEROL FUMARATE 2 PUFF: 160; 9; 4.8 AEROSOL, METERED RESPIRATORY (INHALATION) at 08:47

## 2025-06-25 RX ADMIN — Medication 1 TABLET: at 08:46

## 2025-06-25 RX ADMIN — INSULIN LISPRO 3 UNITS: 100 INJECTION, SOLUTION INTRAVENOUS; SUBCUTANEOUS at 16:20

## 2025-06-25 RX ADMIN — OXYCODONE HYDROCHLORIDE 5 MG: 5 TABLET ORAL at 15:00

## 2025-06-25 RX ADMIN — Medication 4.5 G: at 10:50

## 2025-06-25 RX ADMIN — VANCOMYCIN HYDROCHLORIDE 1250 MG: 1 INJECTION, POWDER, LYOPHILIZED, FOR SOLUTION INTRAVENOUS at 08:49

## 2025-06-25 RX ADMIN — MICONAZOLE NITRATE: 20 CREAM TOPICAL at 08:48

## 2025-06-25 RX ADMIN — PREDNISONE 5 MG: 5 TABLET ORAL at 08:46

## 2025-06-25 RX ADMIN — FAMOTIDINE 20 MG: 20 TABLET, FILM COATED ORAL at 08:46

## 2025-06-25 RX ADMIN — Medication 4.5 G: at 02:13

## 2025-06-25 RX ADMIN — INSULIN LISPRO 1 UNITS: 100 INJECTION, SOLUTION INTRAVENOUS; SUBCUTANEOUS at 07:31

## 2025-06-25 RX ADMIN — INSULIN LISPRO 3 UNITS: 100 INJECTION, SOLUTION INTRAVENOUS; SUBCUTANEOUS at 21:19

## 2025-06-25 RX ADMIN — ATENOLOL 25 MG: 25 TABLET ORAL at 16:19

## 2025-06-25 RX ADMIN — OXYCODONE HYDROCHLORIDE 5 MG: 5 TABLET ORAL at 02:21

## 2025-06-25 NOTE — PLAN OF CARE
Problem: Prexisting or High Potential for Compromised Skin Integrity  Goal: Skin integrity is maintained or improved  Description: INTERVENTIONS:  - Identify patients at risk for skin breakdown  - Assess and monitor skin integrity including under and around medical devices   - Assess and monitor nutrition and hydration status  - Monitor labs  - Assess for incontinence   - Turn and reposition patient  - Assist with mobility/ambulation  - Relieve pressure over joycelyn prominences   - Avoid friction and shearing  - Provide appropriate hygiene as needed including keeping skin clean and dry  - Evaluate need for skin moisturizer/barrier cream  - Collaborate with interdisciplinary team  - Patient/family teaching  - Consider wound care consult    Assess:  - Review Sae scale daily  - Clean and moisturize skin every shift  - Inspect skin when repositioning, toileting, and assisting with ADLS  - Assess under medical devices   - Assess extremities for adequate circulation and sensation     Bed Management:  - Have minimal linens on bed & keep smooth, unwrinkled  - Change linens as needed when moist or perspiring  - Avoid sitting or lying in one position for more than 2 hours while in bed?Keep HOB at 30 degrees   - Toileting:  - Offer bedside commode  - Assess for incontinence   - Use incontinent care products after each incontinent episode     Activity:  - Mobilize patient 3 times a day  - Encourage activity and walks on unit  - Encourage or provide ROM exercises   - Turn and reposition patient every 2 Hours  - Use appropriate equipment to lift or move patient in bed  - Instruct/ Assist with weight shifting every 60 when out of bed in chair  - Consider limitation of chair time 2 hour intervals    Skin Care:  - Avoid use of baby powder, tape, friction and shearing, hot water or constrictive clothing  - Relieve pressure over bony prominences using mepelex  - Do not massage red bony areas    Next Steps:  - Teach patient  strategies to minimize risks  - Consider consults to  interdisciplinary teams   Outcome: Progressing     Problem: PAIN - ADULT  Goal: Verbalizes/displays adequate comfort level or baseline comfort level  Description: Interventions:  - Encourage patient to monitor pain and request assistance  - Assess pain using appropriate pain scale  - Administer analgesics as ordered based on type and severity of pain and evaluate response  - Implement non-pharmacological measures as appropriate and evaluate response  - Consider cultural and social influences on pain and pain management  - Notify physician/advanced practitioner if interventions unsuccessful or patient reports new pain  - Educate patient/family on pain management process including their role and importance of  reporting pain   - Provide non-pharmacologic/complimentary pain relief interventions  Outcome: Progressing     Problem: INFECTION - ADULT  Goal: Absence or prevention of progression during hospitalization  Description: INTERVENTIONS:  - Assess and monitor for signs and symptoms of infection  - Monitor lab/diagnostic results  - Monitor all insertion sites, i.e. indwelling lines, tubes, and drains  - Monitor endotracheal if appropriate and nasal secretions for changes in amount and color  - [place] appropriate cooling/warming therapies per order  - Administer medications as ordered  - Instruct and encourage patient and family to use good hand hygiene technique  - Identify and instruct in appropriate isolation precautions for identified infection/condition  Outcome: Progressing  Goal: Absence of fever/infection during neutropenic period  Description: INTERVENTIONS:  - Monitor WBC  - Perform strict hand hygiene  - Limit to healthy visitors only  - No plants, dried, fresh or silk flowers with otoole in patient room  Outcome: Progressing     Problem: SAFETY ADULT  Goal: Patient will remain free of falls  Description: INTERVENTIONS:  - Educate patient/family on  patient safety including physical limitations  - Instruct patient to call for assistance with activity   - Consider consulting OT/PT to assist with strengthening/mobility based on AM PAC & JH-HLM score  - Consult OT/PT to assist with strengthening/mobility   - Keep Call bell within reach  - Keep bed low and locked with side rails adjusted as appropriate  - Keep care items and personal belongings within reach  - Initiate and maintain comfort rounds  - Make Fall Risk Sign visible to staff  - Offer Toileting every 2 Hours, in advance of need  - Initiate/Maintain bed alarm  - Obtain necessary fall risk management equipment:   - Apply yellow socks and bracelet for high fall risk patients  - Consider moving patient to room near nurses station  Outcome: Progressing  Goal: Maintain or return to baseline ADL function  Description: INTERVENTIONS:  -  Assess patient's ability to carry out ADLs; assess patient's baseline for ADL function and identify physical deficits which impact ability to perform ADLs (bathing, care of mouth/teeth, toileting, grooming, dressing, etc.)  - Assess/evaluate cause of self-care deficits   - Assess range of motion  - Assess patient's mobility; develop plan if impaired  - Assess patient's need for assistive devices and provide as appropriate  - Encourage maximum independence but intervene and supervise when necessary  - Involve family in performance of ADLs  - Assess for home care needs following discharge   - Consider OT consult to assist with ADL evaluation and planning for discharge  - Provide patient education as appropriate  - Monitor functional capacity and physical performance, use of AM PAC & JH-HLM   - Monitor gait, balance and fatigue with ambulation    Outcome: Progressing  Goal: Maintains/Returns to pre admission functional level  Description: INTERVENTIONS:  - Perform AM-PAC 6 Click Basic Mobility/ Daily Activity assessment daily.  - Set and communicate daily mobility goal to care team  and patient/family/caregiver.   - Collaborate with rehabilitation services on mobility goals if consulted  - Perform Range of Motion 3 times a day.  - Reposition patient every 2 hours.  - Dangle patient 3 times a day  - Stand patient 3 times a day  - Ambulate patient 3 times a day  - Out of bed for meals   - Out of bed for toileting  - Record patient progress and toleration of activity level   Outcome: Progressing     Problem: DISCHARGE PLANNING  Goal: Discharge to home or other facility with appropriate resources  Description: INTERVENTIONS:  - Identify barriers to discharge w/patient and caregiver  - Arrange for needed discharge resources and transportation as appropriate  - Identify discharge learning needs (meds, wound care, etc.)  - Arrange for interpretive services to assist at discharge as needed  - Refer to Case Management Department for coordinating discharge planning if the patient needs post-hospital services based on physician/advanced practitioner order or complex needs related to functional status, cognitive ability, or social support system  Outcome: Progressing     Problem: Knowledge Deficit  Goal: Patient/family/caregiver demonstrates understanding of disease process, treatment plan, medications, and discharge instructions  Description: Complete learning assessment and assess knowledge base.  Interventions:  - Provide teaching at level of understanding  - Provide teaching via preferred learning methods  Outcome: Progressing     Problem: Nutrition/Hydration-ADULT  Goal: Nutrient/Hydration intake appropriate for improving, restoring or maintaining nutritional needs  Description: Monitor and assess patient's nutrition/hydration status for malnutrition. Collaborate with interdisciplinary team and initiate plan and interventions as ordered.  Monitor patient's weight and dietary intake as ordered or per policy. Utilize nutrition screening tool and intervene as necessary. Determine patient's food  preferences and provide high-protein, high-caloric foods as appropriate.     INTERVENTIONS:  - Monitor oral intake, urinary output, labs, and treatment plans  - Assess nutrition and hydration status and recommend course of action  - Evaluate amount of meals eaten  - Assist patient with eating if necessary   - Allow adequate time for meals  - Recommend/ encourage appropriate diets, oral nutritional supplements, and vitamin/mineral supplements  - Order, calculate, and assess calorie counts as needed  - Recommend, monitor, and adjust tube feedings and TPN/PPN based on assessed needs  - Assess need for intravenous fluids  - Provide specific nutrition/hydration education as appropriate  - Include patient/family/caregiver in decisions related to nutrition  Outcome: Progressing     Problem: CARDIOVASCULAR - ADULT  Goal: Maintains optimal cardiac output and hemodynamic stability  Description: INTERVENTIONS:  - Monitor I/O, vital signs and rhythm  - Monitor for S/S and trends of decreased cardiac output  - Administer and titrate ordered vasoactive medications to optimize hemodynamic stability  - Assess quality of pulses, skin color and temperature  - Assess for signs of decreased coronary artery perfusion  - Instruct patient to report change in severity of symptoms  Outcome: Progressing  Goal: Absence of cardiac dysrhythmias or at baseline rhythm  Description: INTERVENTIONS:  - Continuous cardiac monitoring, vital signs, obtain 12 lead EKG if ordered  - Administer antiarrhythmic and heart rate control medications as ordered  - Monitor electrolytes and administer replacement therapy as ordered  Outcome: Progressing     Problem: RESPIRATORY - ADULT  Goal: Achieves optimal ventilation and oxygenation  Description: INTERVENTIONS:  - Assess for changes in respiratory status  - Assess for changes in mentation and behavior  - Position to facilitate oxygenation and minimize respiratory effort  - Oxygen administered by appropriate  delivery if ordered  - Initiate smoking cessation education as indicated  - Encourage broncho-pulmonary hygiene including cough, deep breathe, Incentive Spirometry  - Assess the need for suctioning and aspirate as needed  - Assess and instruct to report SOB or any respiratory difficulty  - Respiratory Therapy support as indicated  Outcome: Progressing     Problem: GASTROINTESTINAL - ADULT  Goal: Minimal or absence of nausea and/or vomiting  Description: INTERVENTIONS:  - Administer IV fluids if ordered to ensure adequate hydration  - Maintain NPO status until nausea and vomiting are resolved  - Nasogastric tube if ordered  - Administer ordered antiemetic medications as needed  - Provide nonpharmacologic comfort measures as appropriate  - Advance diet as tolerated, if ordered  - Consider nutrition services referral to assist patient with adequate nutrition and appropriate food choices  Outcome: Progressing  Goal: Maintains or returns to baseline bowel function  Description: INTERVENTIONS:  - Assess bowel function  - Encourage oral fluids to ensure adequate hydration  - Administer IV fluids if ordered to ensure adequate hydration  - Administer ordered medications as needed  - Encourage mobilization and activity  - Consider nutritional services referral to assist patient with adequate nutrition and appropriate food choices  Outcome: Progressing  Goal: Maintains adequate nutritional intake  Description: INTERVENTIONS:  - Monitor percentage of each meal consumed  - Identify factors contributing to decreased intake, treat as appropriate  - Assist with meals as needed  - Monitor I&O, weight, and lab values if indicated  - Obtain nutrition services referral as needed  Outcome: Progressing  Goal: Oral mucous membranes remain intact  Description: INTERVENTIONS  - Assess oral mucosa and hygiene practices  - Implement preventative oral hygiene regimen  - Implement oral medicated treatments as ordered  - Initiate Nutrition  services referral as needed  Outcome: Progressing     Problem: GENITOURINARY - ADULT  Goal: Maintains or returns to baseline urinary function  Description: INTERVENTIONS:  - Assess urinary function  - Encourage oral fluids to ensure adequate hydration if ordered  - Administer IV fluids as ordered to ensure adequate hydration  - Administer ordered medications as needed  - Offer frequent toileting  - Follow urinary retention protocol if ordered  Outcome: Progressing  Goal: Absence of urinary retention  Description: INTERVENTIONS:  - Assess patient’s ability to void and empty bladder  - Monitor I/O  - Bladder scan as needed  - Discuss with physician/AP medications to alleviate retention as needed  - Discuss catheterization for long term situations as appropriate  Outcome: Progressing     Problem: METABOLIC, FLUID AND ELECTROLYTES - ADULT  Goal: Electrolytes maintained within normal limits  Description: INTERVENTIONS:  - Monitor labs and assess patient for signs and symptoms of electrolyte imbalances  - Administer electrolyte replacement as ordered  - Monitor response to electrolyte replacements, including repeat lab results as appropriate  - Instruct patient on fluid and nutrition as appropriate  Outcome: Progressing  Goal: Fluid balance maintained  Description: INTERVENTIONS:  - Monitor labs   - Monitor I/O and WT  - Instruct patient on fluid and nutrition as appropriate  - Assess for signs & symptoms of volume excess or deficit  Outcome: Progressing  Goal: Glucose maintained within target range  Description: INTERVENTIONS:  - Monitor Blood Glucose as ordered  - Assess for signs and symptoms of hyperglycemia and hypoglycemia  - Administer ordered medications to maintain glucose within target range  - Assess nutritional intake and initiate nutrition service referral as needed  Outcome: Progressing     Problem: SKIN/TISSUE INTEGRITY - ADULT  Goal: Skin Integrity remains intact(Skin Breakdown Prevention)  Description:  Assess:  -Perform Sae assessment every shift  -Clean and moisturize skin every shift  -Inspect skin when repositioning, toileting, and assisting with ADLS  -Assess under medical devices   -Assess extremities for adequate circulation and sensation     Bed Management:  -Have minimal linens on bed & keep smooth, unwrinkled  -Change linens as needed when moist or perspiring  -Avoid sitting or lying in one position for more than 2 hours while in bed  -Keep HOB at 30 degrees     Toileting:  -Offer bedside commode  -Assess for incontinence  -Use incontinent care products after each incontinent episode    Activity:  -Mobilize patient 3 times a day  -Encourage activity and walks on unit  -Encourage or provide ROM exercises   -Turn and reposition patient every 2 Hours  -Use appropriate equipment to lift or move patient in bed  -Instruct/ Assist with weight shifting every hour when out of bed in chair  -Consider limitation of chair time 4 hour intervals    Skin Care:  -Avoid use of baby powder, tape, friction and shearing, hot water or constrictive clothing  -Relieve pressure over bony prominences using foam dressings  -Do not massage red bony areas    Outcome: Progressing  Goal: Incision(s), wounds(s) or drain site(s) healing without S/S of infection  Description: INTERVENTIONS  - Assess and document dressing, incision, wound bed, drain sites and surrounding tissue  - Provide patient and family education  - Perform skin care/dressing changes every shift  Outcome: Progressing  Goal: Pressure injury heals and does not worsen  Description: Interventions:  - Implement low air loss mattress or specialty surface (Criteria met)  - Apply silicone foam dressing  - Instruct/assist with weight shifting every hour when in chair   - Limit chair time to 4 hour intervals  - Use special pressure reducing interventions such as waffle cushion when in chair   - Apply fecal or urinary incontinence containment device   - Perform passive or  active ROM every shift  - Turn and reposition patient & offload bony prominences every 2 hours   - Utilize friction reducing device or surface for transfers   Outcome: Progressing     Problem: Potential for Falls  Goal: Patient will remain free of falls  Description: INTERVENTIONS:  - Educate patient/family on patient safety including physical limitations  - Instruct patient to call for assistance with activity   - Consider consulting OT/PT to assist with strengthening/mobility based on AM PAC & JH-HLM score  - Consult OT/PT to assist with strengthening/mobility   - Keep Call bell within reach  - Keep bed low and locked with side rails adjusted as appropriate  - Keep care items and personal belongings within reach  - Initiate and maintain comfort rounds  - Make Fall Risk Sign visible to staff  - Offer Toileting every 2 Hours, in advance of need  - Initiate/Maintain bed alarm  - Obtain necessary fall risk management equipment:   - Apply yellow socks and bracelet for high fall risk patients  - Consider moving patient to room near nurses station  Outcome: Progressing

## 2025-06-25 NOTE — CASE MANAGEMENT
Case Management Discharge Planning Note    Patient name Gold Van  Location ICU 02/ICU  MRN [id number]  : 1945 Date 2025       Current Admission Date: 2025  Current Admission Diagnosis:Septic shock ([facility])   Patient Active Problem List    Diagnosis Date Noted    Pressure injury of skin of sacral region 2025    Metabolic acidosis 2025    Ambulatory dysfunction 2025    Osteomyelitis ([facility]) 2025    Anemia 2025    Pulmonary hypertension ([facility]) 2025    Septic shock ([facility]) 2025    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 ([facility]) 2025    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 ([facility]) 2025    PAF (paroxysmal atrial fibrillation) ([facility]ly [facility]) 2025    Chronic heart failure with preserved ejection fraction (HFpEF) ([facility]) 2025    Ulcer of right foot with fat layer exposed secondary to osteomyelitis 2025    Severe peripheral arterial disease ([facility]) 2024    Moderate protein-calorie malnutrition ([facility]) 10/09/2024    Gastroesophageal reflux disease without esophagitis 10/04/2024    Neuropathy 10/04/2024    Foot drop, left 10/04/2024    CVA (cerebrovascular accident) ([facility]) 10/02/2024    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    History of pneumothorax 2024    Non-recurrent bilateral inguinal hernia without obstruction or gangrene 2024    Pruritic condition 2024    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 2021    Acute renal failure superimposed on stage 2 chronic kidney disease  ([facility]) 2021    Hyponatremia 2021    Lung nodule 2021    Type 2 diabetes mellitus with complication, without long-term current use of insulin ([facility]) 10/23/2017    Essential hypertension 10/23/2017    Mild intermittent asthma without complication 10/23/2017    Mixed hyperlipidemia 10/23/2017      LOS (days): 8  Geometric Mean LOS (GMLOS) (days): 4.9  Days  to GMLOS:-3.1     OBJECTIVE:  Risk of Unplanned Readmission Score: 32.73         Current admission status: Inpatient   Preferred Pharmacy:   [facility] [place] - [address]  [address]  [place] [address]  Phone: [phone] Fax: [phone]    [facility] - [place] - [address]  [address] [facility] [address]  Phone: [phone] Fax: [phone]    [facility] ([facility]) #[id number] [place], [place] - [address] [address]  Phone: [phone] Fax: [phone]    Primary Care Provider: Shayne Daiz MD    Primary Insurance: [employer] [facility]  Secondary Insurance:     DISCHARGE DETAILS:  Pt chose [facility] for STR.  As per SLIM the pt is stable to submit for authorization.  Sent for authorization via CM support team.  Pt is aware of same.

## 2025-06-25 NOTE — [facility] ADMISSION
[facility] community Liaison called  family- pt's wife Mya, introduced self, explained role, reviewed [facility] program, services offered, acute rehab criteria, review of referral by [facility] Medical Director, insurance authorization process, the three [facility] locations and anticipated rehab length of stay.; all questions answered. family first choice is [facility] . family made aware [facility] reviewer will communicate with the  who will keep patient updated on referral status.

## 2025-06-25 NOTE — ASSESSMENT & PLAN NOTE
Malnutrition Findings:                                 BMI Findings:           Body mass index is 18.66 kg/m².

## 2025-06-25 NOTE — PROGRESS NOTES
Progress Note - Podiatry   Name: Gold Van 79 y.o. male I MRN: [id number]  Unit/Bed#: ICU 02-01 I Date of Admission: 6/17/2025   Date of Service: 6/25/2025 I Hospital Day: 8     Assessment & Plan  Septic shock ([facility])    Type 2 diabetes mellitus with complication, without long-term current use of insulin ([facility])  Lab Results   Component Value Date    HGBA1C 6.6 (H) 06/18/2025       Recent Labs     06/24/25  1604 06/24/25  2131 06/25/25  0718 06/25/25  1054   POCGLU 347* 240* 169* 252*       Blood Sugar Average: Last 72 hrs:  (P) [id number]    Essential hypertension    Mixed hyperlipidemia    Acute renal failure superimposed on stage 2 chronic kidney disease  ([facility])  Lab Results   Component Value Date    EGFR 82 06/25/2025    EGFR 67 06/24/2025    EGFR 84 06/23/2025    CREATININE 0.87 06/25/2025    CREATININE 1.04 06/24/2025    CREATININE 0.81 06/23/2025     Hyponatremia    Pruritic condition    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[facility])    CVA (cerebrovascular accident) ([facility])    Gastroesophageal reflux disease without esophagitis    Moderate protein-calorie malnutrition ([facility])  Malnutrition Findings:                                 BMI Findings:           Body mass index is 18.66 kg/m².     Severe peripheral arterial disease ([facility])    PAF (paroxysmal atrial fibrillation) ([facility])    Anemia    Pressure injury of skin of sacral region    Metabolic acidosis    Ambulatory dysfunction      - Dressing change at bedside, good postoperative parents, good capillary fill time with brisk refilling  - No signs infection at the postoperative site  - I am unsure about surgical care, possible than marginal on 4/5 metatarsals could be dirty  - Await final cultures  - Strict nonweightbearing until sutures are removed from the right foot    Subjective : Seen at bedside for evaluation and management.  Has no pedal complaints, feels very good has slight pain in the foot    Objective :  Temp:  [97.5 °F (36.4 °C)-99.9 °F (37.7 °C)] 99.1 °F (37.3  °C)  HR:  [] 99  BP: (139-162)/(67-92) 152/71  Resp:  [16-46] 21  SpO2:  [91 %-98 %] 95 %  O2 Device: None (Room air)    Physical Exam    Skin:     Comments: The foot looks fantastic, cellulitis is resolved, E fill time is brisk to the plantar flap, normal postoperative.         Lab Results: I have reviewed the following results:

## 2025-06-25 NOTE — ASSESSMENT & PLAN NOTE
Malnutrition Findings:        Nutrition following  Monitor appetites                       BMI Findings:           Body mass index is 18.66 kg/m².

## 2025-06-25 NOTE — PROGRESS NOTES
Patient:  LENA SCHMIDT    MRN:  [id number]    Aidin Request ID:  [id number]    Level of care reserved:  Inpatient Rehab Facility    Partner Reserved:  [facility] - ([place]), [place] [address] [phone]    Clinical needs requested:    Geography searched:  20 miles around [id number]    Start of Service:    Request sent:  3:24pm EDT on 6/24/2025 by Joslyn Grande    Partner reserved:  12:30pm EDT on 6/25/2025 by Joslyn Grande    Choice list shared:  12:29pm EDT on 6/25/2025 by Joslyn Grande

## 2025-06-25 NOTE — ASSESSMENT & PLAN NOTE
Baseline hemoglobin appears to be 8.5-9.5  Received 1 UPRBC 6/18 and 6/23  Hemoglobin stable and baseline, no signs of bleeding

## 2025-06-25 NOTE — DISCHARGE SUPPORT
Received call from Sushma Montemayor [employer] (P#: [phone]). Stated pended auth case will be sent to the medical director for additional review prior to making a determination. Auth still pended at this time. CM notified.

## 2025-06-25 NOTE — ASSESSMENT & PLAN NOTE
Lab Results   Component Value Date    HGBA1C 6.6 (H) 06/18/2025       Recent Labs     06/24/25  2131 06/25/25  0718 06/25/25  1054 06/25/25  1546   POCGLU 240* 169* 252* 325*       Blood Sugar Average: Last 72 hrs:  (P) 161.2  Hold prehospital metformin and glipizide while inpatient resume on discharge  Blood sugar results reviewed  Continue sliding scale insulin coverage ACHS  CHO diet  Hypoglycemia protocol  BMP a.mJosué

## 2025-06-25 NOTE — ASSESSMENT & PLAN NOTE
Lab Results   Component Value Date    HGBA1C 6.6 (H) 06/18/2025       Recent Labs     06/24/25  1604 06/24/25  2131 06/25/25  0718 06/25/25  1054   POCGLU 347* 240* 169* 252*       Blood Sugar Average: Last 72 hrs:  (P) [id number]

## 2025-06-25 NOTE — ASSESSMENT & PLAN NOTE
RLE NWB  PT OT-recommend rehab placement  Case management is making rehab referrals  Medically stable for discharge when arrangements finalized

## 2025-06-25 NOTE — ASSESSMENT & PLAN NOTE
Lab Results   Component Value Date    EGFR 82 06/25/2025    EGFR 67 06/24/2025    EGFR 84 06/23/2025    CREATININE 0.87 06/25/2025    CREATININE 1.04 06/24/2025    CREATININE 0.81 06/23/2025

## 2025-06-25 NOTE — PROGRESS NOTES
Progress Note - Hospitalist   Name: Gold Van 79 y.o. male I MRN: [id number]  Unit/Bed#: ICU 02-01 I Date of Admission: 6/17/2025   Date of Service: 6/25/2025 I Hospital Day: 8    Assessment & Plan  Septic shock ([facility])  SHOCK STATE RESOLVED  Source: RLE osteo  Hx serratia/pseudomonas/e faecalis wound infection in past  MRI R foot-Postsurgical changes of partial fourth and fifth ray resections with a soft tissue ulcer overlying the cut surfaces of the fourth and fifth metatarsals and findings concerning for early osteomyelitis in the residual fourth metatarsal. No definite MRI evidence of osteomyelitis. The ulcer is also in close approximation to the distal third metatarsal, and early osteomyelitis in that location cannot be excluded. No evidence of an abscess  Continue Zosyn/Vanco D9 (continuing antibiotics a bit longer due to severity of infection after discussion with podiatry.  De-escalate when appropriate)  S/p R TMA 6/23/2025  OR cultures pending  Case management continues to follow.  Rehab referrals have been made.  Awaiting authorization for [facility].  Acute renal failure superimposed on stage 2 chronic kidney disease  ([facility])  Lab Results   Component Value Date    EGFR 82 06/25/2025    EGFR 67 06/24/2025    EGFR 84 06/23/2025    CREATININE 0.87 06/25/2025    CREATININE 1.04 06/24/2025    CREATININE 0.81 06/23/2025     POA with creatinine 1.49  Creatinine is now around baseline of 0.9-1.0  Likely prerenal in setting of shock state which is now resolved  Creatinine currently at baseline, euvolemic on exam  Type 2 diabetes mellitus with complication, without long-term current use of insulin ([facility])  Lab Results   Component Value Date    HGBA1C 6.6 (H) 06/18/2025       Recent Labs     06/24/25  2131 06/25/25  0718 06/25/25  1054 06/25/25  1546   POCGLU 240* 169* 252* 325*       Blood Sugar Average: Last 72 hrs:  (P) 161.2  Hold prehospital metformin and glipizide while inpatient resume on discharge  Blood sugar  results reviewed  Continue sliding scale insulin coverage ACHS  CHO diet  Hypoglycemia protocol  BMP a.m.  Essential hypertension  Shock state resolved  Will resume lisinopril and atenolol today at prehospital doses  Monitor BP per protocol  CVA (cerebrovascular accident) ([facility])  No residual deficits  Continue home statin and Plavix  PAF (paroxysmal atrial fibrillation) (HCC)  Resuming prehospital atenolol today shock state resolved  Had episodes of atrial fibrillation with RVR 6/21 treated with 3 doses of metoprolol IV followed by Cardizem.  No further episodes  Continue prehospital Eliquis  Mixed hyperlipidemia  Continue home statin  Hyponatremia  Chronically 130-134  Currently 137  Resolved  Pruritic condition  Continue home prednisone  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HCC)   Without exacerbation  Continue prehospital inhalers  Gastroesophageal reflux disease without esophagitis  Continue home PPI  Severe peripheral arterial disease (HCC)  Hx R SFA stent 5/13  Continue prehospital Plavix  Anemia  Baseline hemoglobin appears to be 8.5-9.5  Received 1 UPRBC 6/18 and 6/23  Hemoglobin stable and baseline, no signs of bleeding  Pressure injury of skin of sacral region  Continue wound care per protocol  Metabolic acidosis  Resolved  Ambulatory dysfunction  RLE NWB  PT OT-recommend rehab placement  Case management is making rehab referrals  Medically stable for discharge when arrangements finalized  Moderate protein-calorie malnutrition (HCC)  Malnutrition Findings:        Nutrition following  Monitor appetites                       BMI Findings:           Body mass index is 18.66 kg/m².       VTE Pharmacologic Prophylaxis: VTE Score: 3 Moderate Risk (Score 3-4) - Pharmacological DVT Prophylaxis Ordered: apixaban (Eliquis).    Mobility:   Basic Mobility Inpatient Raw Score: 9  JH-HLM Goal: 3: Sit at edge of bed  JH-HLM Achieved: 4: Move to chair/commode  JH-HLM Goal achieved. Continue to encourage appropriate mobility.    Patient  Centered Rounds: I performed bedside rounds with nursing staff today.   Discussions with Specialists or Other Care Team Provider: ID, nursing, case management    Education and Discussions with Family / Patient: Updated  (wife) via phone.    Current Length of Stay: 8 day(s)  Current Patient Status: Inpatient   Certification Statement: The patient will continue to require additional inpatient hospital stay due to coordination of care  Discharge Plan: Medically stable.  We are awaiting authorization from patient's insurance for [facility].  Case management continues to follow.    Code Status: Level 1 - Full Code    Subjective   Denies any dizziness, lightness, chest pain or palpitations.  Denies pain or discomfort.  Verbalize needs.    Objective :  Temp:  [97.5 °F (36.4 °C)-99.9 °F (37.7 °C)] 99.1 °F (37.3 °C)  HR:  [] 109  BP: (139-164)/(67-92) 142/75  Resp:  [16-46] 21  SpO2:  [94 %-98 %] 95 %  O2 Device: None (Room air)    Body mass index is 18.66 kg/m².     Input and Output Summary (last 24 hours):     Intake/Output Summary (Last 24 hours) at 6/25/2025 1601  Last data filed at 6/25/2025 1200  Gross per 24 hour   Intake 640 ml   Output 2000 ml   Net -1360 ml       Physical Exam  Vitals reviewed.   Constitutional:       General: He is not in acute distress.     Appearance: He is well-developed.   HENT:      Head: Normocephalic and atraumatic.     Cardiovascular:      Rate and Rhythm: Normal rate and regular rhythm.      Heart sounds: No murmur heard.  Pulmonary:      Effort: Pulmonary effort is normal. No respiratory distress.      Breath sounds: Normal breath sounds. No wheezing or rales.   Abdominal:      General: There is no distension.      Palpations: Abdomen is soft.      Tenderness: There is no abdominal tenderness. There is no guarding.     Musculoskeletal:         General: No swelling.     Skin:     General: Skin is warm and dry.      Capillary Refill: Capillary refill takes less than 2 seconds.       Comments: Right foot dressing clean dry intact     Neurological:      General: No focal deficit present.      Mental Status: He is alert and oriented to person, place, and time. Mental status is at baseline.     Psychiatric:         Mood and Affect: Mood normal.         Behavior: Behavior normal.         Thought Content: Thought content normal.         Judgment: Judgment normal.           Lines/Drains:              Lab Results: I have reviewed the following results:   Results from last 7 days   Lab Units 06/25/25  0605   WBC Thousand/uL 7.23   HEMOGLOBIN g/dL 9.6*   HEMATOCRIT % 29.8*   PLATELETS Thousands/uL 273   SEGS PCT % 74   LYMPHO PCT % 17   MONO PCT % 7   EOS PCT % 1     Results from last 7 days   Lab Units 06/25/25  0605   SODIUM mmol/L 132*   POTASSIUM mmol/L 3.7   CHLORIDE mmol/L 101   CO2 mmol/L 25   BUN mg/dL 12   CREATININE mg/dL 0.87   ANION GAP mmol/L 6   CALCIUM mg/dL 8.5   GLUCOSE RANDOM mg/dL 190*         Results from last 7 days   Lab Units 06/25/25  1546 06/25/25  1054 06/25/25  0718 06/24/25  2131 06/24/25  1604 06/24/25  1211 06/24/25  0956 06/24/25  0801 06/24/25  0554 06/24/25  0416 06/24/25  0217 06/24/25  0035   POC GLUCOSE mg/dl 325* 252* 169* 240* 347* 263* 203* 124 131 143* 171* 178*         Results from last 7 days   Lab Units 06/19/25  0351   PROCALCITONIN ng/ml 18.42*       Recent Cultures (last 7 days):   Results from last 7 days   Lab Units 06/23/25  1454   GRAM STAIN RESULT  No Polys or Bacteria seen  No Polys or Bacteria seen   WOUND CULTURE  2+ Growth of Staphylococcus aureus*  3 colonies Serratia marcescens*  3 colonies Pseudomonas aeruginosa*  2 colonies Staphylococcus aureus*  2 colonies       Imaging Results Review: I reviewed radiology reports from this admission including: MRI right foot, right foot x-ray.  Other Study Results Review: No additional pertinent studies reviewed.    Last 24 Hours Medication List:     Current Facility-Administered Medications:      acetaminophen (TYLENOL) tablet 650 mg, Q6H PRN    albuterol (PROVENTIL HFA,VENTOLIN HFA) inhaler 2 puff, Q4H PRN    albuterol inhalation solution 2.5 mg, Q6H PRN    apixaban (ELIQUIS) tablet 5 mg, BID    atenolol (TENORMIN) tablet 25 mg, Daily    atorvastatin (LIPITOR) tablet 40 mg, QPM    Budeson-Glycopyrrol-Formoterol 160-9-4.8 MCG/ACT AERO 2 puff, Q12H SANDRA    clopidogrel (PLAVIX) tablet 75 mg, Daily    diltiazem (CARDIZEM) injection 15 mg, Once    diphenhydrAMINE (BENADRYL) injection 25 mg, Q6H PRN    famotidine (PEPCID) tablet 20 mg, Daily    heparin (porcine) injection 1,800 Units, Q6H PRN    heparin (porcine) injection 3,600 Units, Q6H PRN    HYDROmorphone HCl (DILAUDID) injection 0.2 mg, Q2H PRN    insulin lispro (HumALOG/ADMELOG) 100 units/mL subcutaneous injection 1-5 Units, TID AC **AND** Fingerstick Glucose (POCT), TID AC    insulin lispro (HumALOG/ADMELOG) 100 units/mL subcutaneous injection 1-5 Units, HS    lisinopril (ZESTRIL) tablet 5 mg, Daily    moisture barrier miconazole 2% cream (aka CALEB MOISTURE BARRIER ANTIFUNGAL CREAM), BID    montelukast (SINGULAIR) tablet 10 mg, HS    multivitamin-minerals (CENTRUM) tablet 1 tablet, Daily    naloxone (NARCAN) 0.04 mg/mL syringe 0.04 mg, Q1MIN PRN    ondansetron (ZOFRAN) injection 4 mg, Q6H PRN    oxyCODONE (ROXICODONE) split tablet 2.5 mg, Q4H PRN **OR** oxyCODONE (ROXICODONE) IR tablet 5 mg, Q4H PRN    [COMPLETED] piperacillin-tazobactam (ZOSYN) 4.5 g in sodium chloride 0.9 % 100 mL IV LOADING DOSE, Once, Last Rate: 4.5 g (06/17/25 1600) **FOLLOWED BY** piperacillin-tazobactam (ZOSYN) 4.5 g in sodium chloride 0.9 % 100 mL IVPB (EXTENDED INFUSION), Q8H, Last Rate: 4.5 g (06/25/25 1050)    polyethylene glycol (MIRALAX) packet 17 g, Daily PRN    predniSONE tablet 5 mg, Daily    senna-docusate sodium (SENOKOT S) 8.6-50 mg per tablet 2 tablet, HS    vancomycin (VANCOCIN) 1,250 mg in sodium chloride 0.9 % 250 mL IVPB, Q24H, Last Rate: Stopped (06/25/25  9609)    Administrative Statements   Today, Patient Was Seen By: EBLGICA Nelson  I have spent a total time of 36 minutes in caring for this patient on the day of the visit/encounter including Patient and family education, Counseling / Coordination of care, Documenting in the medical record, Reviewing/placing orders in the medical record (including tests, medications, and/or procedures), Obtaining or reviewing history  , and Communicating with other healthcare professionals .    **Please Note: This note may have been constructed using a voice recognition system.**

## 2025-06-25 NOTE — ASSESSMENT & PLAN NOTE
09/11/23 0013   Admission   Initial VN Admission Questions Complete   Shift   Virtual Nurse - Patient Verbalized Approval Of Camera Use;VN Rounding   Safety/Activity   Patient Rounds bed in low position;call light in patient/parent reach;clutter free environment maintained;visualized patient;placement of personal items at bedside   Safety Promotion/Fall Prevention assistive device/personal item within reach;Fall Risk reviewed with patient/family;side rails raised x 2;family to remain at bedside   Positioning   Body Position supine   Head of Bed (HOB) Positioning HOB at 30-45 degrees     VN cued in to pt's room with permission. Pt's daughter at bedside. Admission questions completed with pt. Plan of care reviewed with pt and daughter. Pt denies any questions or concerns at this time. Call bell w/in reach. Instructed to call for needs/assist oob.     Resolved

## 2025-06-25 NOTE — PROGRESS NOTES
Gold Van is a 79 y.o. male who is currently ordered Vancomycin IV with management by the Pharmacy Consult service.  Relevant clinical data and objective / subjective history reviewed.  Vancomycin Assessment:  Indication and Goal AUC/Trough: Bone/joint infection (goal -600, trough >10), -600, trough >10  Clinical Status: stable  Micro:     Renal Function:  SCr: 0.87 mg/dL  CrCl: 60.8 mL/min  Renal replacement: Not on dialysis  Days of Therapy: 9  Current Dose: 1250mg IV Q24hrs  Vancomycin Plan:  New Dosing: continue 1250mg IV Q24hrs  Estimated AUC: 451 mcg*hr/mL  Estimated Trough: 10.2 mcg/mL  Next Level: 6/30 with am labs   Renal Function Monitoring: Daily BMP and UOP  Pharmacy will continue to follow closely for s/sx of nephrotoxicity, infusion reactions and appropriateness of therapy.  BMP and CBC will be ordered per protocol. We will continue to follow the patient’s culture results and clinical progress daily.    Vidhi Bowie, Pharmacist

## 2025-06-25 NOTE — ASSESSMENT & PLAN NOTE
Shock state resolved  Will resume lisinopril and atenolol today at prehospital doses  Monitor BP per protocol

## 2025-06-25 NOTE — ASSESSMENT & PLAN NOTE
Lab Results   Component Value Date    EGFR 82 06/25/2025    EGFR 67 06/24/2025    EGFR 84 06/23/2025    CREATININE 0.87 06/25/2025    CREATININE 1.04 06/24/2025    CREATININE 0.81 06/23/2025     POA with creatinine 1.49  Creatinine is now around baseline of 0.9-1.0  Likely prerenal in setting of shock state which is now resolved  Creatinine currently at baseline, euvolemic on exam

## 2025-06-25 NOTE — ASSESSMENT & PLAN NOTE
Resuming prehospital atenolol today shock state resolved  Had episodes of atrial fibrillation with RVR 6/21 treated with 3 doses of metoprolol IV followed by Cardizem.  No further episodes  Continue prehospital Eliquis   [HIV test declined] : HIV test declined [Fully functional (using the telephone, shopping, preparing meals, housekeeping, doing laundry, using] : Fully functional and needs no help or supervision to perform IADLs (using the telephone, shopping, preparing meals, housekeeping, doing laundry, using transportation, managing medications and managing finances) [Fully functional (bathing, dressing, toileting, transferring, walking, feeding)] : Fully functional (bathing, dressing, toileting, transferring, walking, feeding) [With Patient/Caregiver] : With Patient/Caregiver [AdvancecareDate] : 06/19

## 2025-06-25 NOTE — ASSESSMENT & PLAN NOTE
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 No definite MRI evidence of osteomyelitis. The ulcer is also in close approximation to the distal third metatarsal, and early osteomyelitis in that location cannot be excluded. No evidence of an abscess  Continue Zosyn/Vanco D9 (continuing antibiotics a bit longer due to severity of infection after discussion with podiatry.  De-escalate when appropriate)  S/p R TMA 6/23/2025  OR cultures pending  Case management continues to follow.  Rehab referrals have been made.  Awaiting authorization for [facility].

## 2025-06-25 NOTE — [facility] ADMISSION
Patients case reviewed, patient looks appropriate to come to [facility] pending medical stability, LOF at discharge, and insurance authorization approval.  [facility] admissions will continue to follow patient for progression of care and discharge readiness

## 2025-06-25 NOTE — DISCHARGE SUPPORT
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 ([facility]) 2025    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 ([facility]) 2025    PAF (paroxysmal atrial fibrillation) ([facility]) 2025    Chronic heart failure with preserved ejection fraction (HFpEF) ([facility]) 2025    Ulcer of right foot with fat layer exposed secondary to osteomyelitis 2025    Severe peripheral arterial disease ([facility]) 2024    Moderate protein-calorie malnutrition ([facility]) 10/09/2024    Gastroesophageal reflux disease without esophagitis 10/04/2024    Neuropathy 10/04/2024    Foot drop, left 10/04/2024    CVA (cerebrovascular accident) ([facility]) 10/02/2024    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[facility]) 2024    History of pneumothorax 2024    Non-recurrent bilateral inguinal hernia without obstruction or gangrene 2024    Pruritic condition 2024    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 2021    Acute renal failure superimposed on stage 2 chronic kidney disease  ([facility]) 2021    Hyponatremia 2021    Lung nodule 2021    Type 2 diabetes mellitus with complication, without long-term current use of insulin ([facility]) 10/23/2017    Essential hypertension 10/23/2017    Mild intermittent asthma without complication 10/23/2017    Mixed hyperlipidemia 10/23/2017      LOS (days): 8  Geometric Mean LOS (GMLOS)  (days): 4.9  Days to GMLOS:-3.1   Facility Authorization Initiated  [facility] received request for auth from : Marsha Grande  Authorization Request Submitted for: Acute Rehab  Requested Start of Care Date: 06/25/25  Facility Name: [facility] NPI: [id number]  Facility MD: Ashley DePAdua  [facility] MD NPI: [id number]  Authorization initiated by contacting insurance: [employer]  Via: [employer]  Clinicals submitted via: Portal Attachment  Pending reference #: [id number]   notified: Marsha Grande     Updates to authorization status will be noted in chart.    Please reach out to CM for updates on any clinical information.

## 2025-06-26 ENCOUNTER — APPOINTMENT (INPATIENT)
Dept: NON INVASIVE DIAGNOSTICS | Facility: HOSPITAL | Age: 80
DRG: 853 | End: 2025-06-26
Payer: COMMERCIAL

## 2025-06-26 PROBLEM — M86.9 OSTEOMYELITIS OF RIGHT FOOT (HCC): Status: ACTIVE | Noted: 2025-06-26

## 2025-06-26 PROBLEM — R78.81 MSSA BACTEREMIA: Status: ACTIVE | Noted: 2025-06-26

## 2025-06-26 PROBLEM — Z88.1 ALLERGY TO ANTIBIOTIC: Status: ACTIVE | Noted: 2025-06-26

## 2025-06-26 PROBLEM — B95.61 MSSA BACTEREMIA: Status: ACTIVE | Noted: 2025-06-26

## 2025-06-26 LAB
ANION GAP SERPL CALCULATED.3IONS-SCNC: 9 MMOL/L (ref 4–13)
AORTIC ROOT: 3.8 CM
ASCENDING AORTA: 3.6 CM
AV LVOT MEAN GRADIENT: 1 MMHG
AV LVOT PEAK GRADIENT: 3 MMHG
BACTERIA WND AEROBE CULT: ABNORMAL
BSA FOR ECHO PROCEDURE: 1.81 M2
BUN SERPL-MCNC: 15 MG/DL (ref 5–25)
CALCIUM SERPL-MCNC: 9.2 MG/DL (ref 8.4–10.2)
CHLORIDE SERPL-SCNC: 100 MMOL/L (ref 96–108)
CO2 SERPL-SCNC: 21 MMOL/L (ref 21–32)
CREAT SERPL-MCNC: 0.88 MG/DL (ref 0.6–1.3)
DOP CALC LVOT AREA: 3.14 CM2
DOP CALC LVOT CARDIAC INDEX: 2.57 L/MIN/M2
DOP CALC LVOT CARDIAC OUTPUT: 4.68 L/MIN
DOP CALC LVOT DIAMETER: 2 CM
DOP CALC LVOT PEAK VEL VTI: 21.6 CM
DOP CALC LVOT PEAK VEL: 0.86 M/S
DOP CALC LVOT STROKE INDEX: 37.4 ML/M2
DOP CALC LVOT STROKE VOLUME: 68
E WAVE DECELERATION TIME: 198 MS
E/A RATIO: 0.64
ERYTHROCYTE [DISTWIDTH] IN BLOOD BY AUTOMATED COUNT: 15.4 % (ref 11.6–15.1)
FRACTIONAL SHORTENING: 27 (ref 28–44)
GFR SERPL CREATININE-BSD FRML MDRD: 81 ML/MIN/1.73SQ M
GLUCOSE SERPL-MCNC: 185 MG/DL (ref 65–140)
GLUCOSE SERPL-MCNC: 186 MG/DL (ref 65–140)
GLUCOSE SERPL-MCNC: 274 MG/DL (ref 65–140)
GLUCOSE SERPL-MCNC: 286 MG/DL (ref 65–140)
GLUCOSE SERPL-MCNC: 390 MG/DL (ref 65–140)
GRAM STN SPEC: ABNORMAL
HCT VFR BLD AUTO: 30.9 % (ref 36.5–49.3)
HGB BLD-MCNC: 9.8 G/DL (ref 12–17)
INTERVENTRICULAR SEPTUM IN DIASTOLE (PARASTERNAL SHORT AXIS VIEW): 1.1 CM
INTERVENTRICULAR SEPTUM: 1.1 CM (ref 0.6–1.1)
LAAS-AP2: 15.1 CM2
LAAS-AP4: 17.7 CM2
LEFT ATRIUM SIZE: 4.1 CM
LEFT ATRIUM VOLUME (MOD BIPLANE): 48 ML
LEFT ATRIUM VOLUME INDEX (MOD BIPLANE): 26.4 ML/M2
LEFT INTERNAL DIMENSION IN SYSTOLE: 4 CM (ref 2.1–4)
LEFT VENTRICULAR INTERNAL DIMENSION IN DIASTOLE: 5.5 CM (ref 3.5–6)
LEFT VENTRICULAR POSTERIOR WALL IN END DIASTOLE: 1 CM
LEFT VENTRICULAR STROKE VOLUME: 78 ML
LV EF US.2D.A4C+ESTIMATED: 54 %
LVSV (TEICH): 78 ML
MCH RBC QN AUTO: 28.2 PG (ref 26.8–34.3)
MCHC RBC AUTO-ENTMCNC: 31.7 G/DL (ref 31.4–37.4)
MCV RBC AUTO: 89 FL (ref 82–98)
MV E'TISSUE VEL-SEP: 5 CM/S
MV PEAK A VEL: 0.94 M/S
MV PEAK E VEL: 60 CM/S
MV STENOSIS PRESSURE HALF TIME: 57 MS
MV VALVE AREA P 1/2 METHOD: 3.9
PLATELET # BLD AUTO: 300 THOUSANDS/UL (ref 149–390)
PMV BLD AUTO: 9.3 FL (ref 8.9–12.7)
POTASSIUM SERPL-SCNC: 4.3 MMOL/L (ref 3.5–5.3)
RBC # BLD AUTO: 3.47 MILLION/UL (ref 3.88–5.62)
RIGHT ATRIUM AREA SYSTOLE A4C: 19.3 CM2
SL CV LEFT ATRIUM LENGTH A2C: 4 CM
SL CV LV EF: 55
SL CV PED ECHO LEFT VENTRICLE DIASTOLIC VOLUME (MOD BIPLANE) 2D: 149 ML
SL CV PED ECHO LEFT VENTRICLE SYSTOLIC VOLUME (MOD BIPLANE) 2D: 71 ML
SODIUM SERPL-SCNC: 130 MMOL/L (ref 135–147)
TR MAX PG: 17 MMHG
TR PEAK VELOCITY: 2 M/S
TRICUSPID ANNULAR PLANE SYSTOLIC EXCURSION: 2.5 CM
TRICUSPID VALVE PEAK REGURGITATION VELOCITY: 2.04 M/S
WBC # BLD AUTO: 8.39 THOUSAND/UL (ref 4.31–10.16)

## 2025-06-26 PROCEDURE — 85027 COMPLETE CBC AUTOMATED: CPT

## 2025-06-26 PROCEDURE — 82948 REAGENT STRIP/BLOOD GLUCOSE: CPT

## 2025-06-26 PROCEDURE — 93306 TTE W/DOPPLER COMPLETE: CPT

## 2025-06-26 PROCEDURE — 87040 BLOOD CULTURE FOR BACTERIA: CPT | Performed by: NURSE PRACTITIONER

## 2025-06-26 PROCEDURE — 80048 BASIC METABOLIC PNL TOTAL CA: CPT

## 2025-06-26 PROCEDURE — 99232 SBSQ HOSP IP/OBS MODERATE 35: CPT

## 2025-06-26 PROCEDURE — 93306 TTE W/DOPPLER COMPLETE: CPT | Performed by: INTERNAL MEDICINE

## 2025-06-26 PROCEDURE — 99223 1ST HOSP IP/OBS HIGH 75: CPT | Performed by: INTERNAL MEDICINE

## 2025-06-26 RX ORDER — INSULIN LISPRO 100 [IU]/ML
10 INJECTION, SOLUTION INTRAVENOUS; SUBCUTANEOUS ONCE
Status: COMPLETED | OUTPATIENT
Start: 2025-06-26 | End: 2025-06-26

## 2025-06-26 RX ORDER — INSULIN GLARGINE 100 [IU]/ML
10 INJECTION, SOLUTION SUBCUTANEOUS
Status: DISCONTINUED | OUTPATIENT
Start: 2025-06-26 | End: 2025-07-02

## 2025-06-26 RX ADMIN — BUDESONIDE, GLYCOPYRROLATE, AND FORMOTEROL FUMARATE 2 PUFF: 160; 9; 4.8 AEROSOL, METERED RESPIRATORY (INHALATION) at 09:15

## 2025-06-26 RX ADMIN — MICONAZOLE NITRATE: 20 CREAM TOPICAL at 09:15

## 2025-06-26 RX ADMIN — INSULIN LISPRO 3 UNITS: 100 INJECTION, SOLUTION INTRAVENOUS; SUBCUTANEOUS at 12:13

## 2025-06-26 RX ADMIN — INSULIN GLARGINE 10 UNITS: 100 INJECTION, SOLUTION SUBCUTANEOUS at 21:46

## 2025-06-26 RX ADMIN — INSULIN LISPRO 3 UNITS: 100 INJECTION, SOLUTION INTRAVENOUS; SUBCUTANEOUS at 17:18

## 2025-06-26 RX ADMIN — INSULIN LISPRO 1 UNITS: 100 INJECTION, SOLUTION INTRAVENOUS; SUBCUTANEOUS at 08:06

## 2025-06-26 RX ADMIN — VANCOMYCIN HYDROCHLORIDE 1250 MG: 1 INJECTION, POWDER, LYOPHILIZED, FOR SOLUTION INTRAVENOUS at 09:14

## 2025-06-26 RX ADMIN — CEFEPIME 2000 MG: 2 INJECTION, POWDER, FOR SOLUTION INTRAVENOUS at 23:19

## 2025-06-26 RX ADMIN — INSULIN LISPRO 10 UNITS: 100 INJECTION, SOLUTION INTRAVENOUS; SUBCUTANEOUS at 21:47

## 2025-06-26 RX ADMIN — Medication 4.5 G: at 01:22

## 2025-06-26 RX ADMIN — ATENOLOL 25 MG: 25 TABLET ORAL at 09:14

## 2025-06-26 RX ADMIN — LISINOPRIL 5 MG: 5 TABLET ORAL at 09:14

## 2025-06-26 RX ADMIN — CLOPIDOGREL 75 MG: 75 TABLET ORAL at 09:14

## 2025-06-26 RX ADMIN — CEFEPIME 2000 MG: 2 INJECTION, POWDER, FOR SOLUTION INTRAVENOUS at 12:13

## 2025-06-26 RX ADMIN — ATORVASTATIN CALCIUM 40 MG: 40 TABLET, FILM COATED ORAL at 17:18

## 2025-06-26 RX ADMIN — MICONAZOLE NITRATE: 20 CREAM TOPICAL at 17:18

## 2025-06-26 RX ADMIN — Medication 4.5 G: at 11:11

## 2025-06-26 RX ADMIN — MONTELUKAST 10 MG: 10 TABLET, FILM COATED ORAL at 21:39

## 2025-06-26 RX ADMIN — Medication 1 TABLET: at 09:14

## 2025-06-26 RX ADMIN — BUDESONIDE, GLYCOPYRROLATE, AND FORMOTEROL FUMARATE 2 PUFF: 160; 9; 4.8 AEROSOL, METERED RESPIRATORY (INHALATION) at 21:40

## 2025-06-26 RX ADMIN — APIXABAN 5 MG: 5 TABLET, FILM COATED ORAL at 17:18

## 2025-06-26 RX ADMIN — APIXABAN 5 MG: 5 TABLET, FILM COATED ORAL at 09:14

## 2025-06-26 RX ADMIN — PREDNISONE 5 MG: 5 TABLET ORAL at 09:14

## 2025-06-26 RX ADMIN — INSULIN LISPRO 5 UNITS: 100 INJECTION, SOLUTION INTRAVENOUS; SUBCUTANEOUS at 21:41

## 2025-06-26 RX ADMIN — FAMOTIDINE 20 MG: 20 TABLET, FILM COATED ORAL at 09:14

## 2025-06-26 NOTE — ASSESSMENT & PLAN NOTE
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 No definite MRI evidence of osteomyelitis. The ulcer is also in close approximation to the distal third metatarsal, and early osteomyelitis in that location cannot be excluded. No evidence of an abscess  S/p R TMA 6/23/2025  1 of 2 blood cultures on arrival with MSSA  Wound culture with MSSA, Pseudomonas and Serratia  ID following, discontinued vancomycin and Zosyn today and started cefepime.  ID feels currently on long-term antibiotics  TTE was ordered  Follow-up blood cultures were drawn today  Case management continues to follow.  Rehab referrals have been made.

## 2025-06-26 NOTE — ASSESSMENT & PLAN NOTE
Appreciate ID who are following  1/2 blood culture on admission with MSSA bacteremia  Patient was noted to have MSSA and soft tissue culture with MSSA, Pseudomonas and Serratia  ID feels this is likely true bacteremia  TTE was ordered  IV Zosyn and vancomycin were discontinued by ID.  Patient was initiated on cefepime 2 g IV every 12 hours dosed for renal function  Follow-up blood cultures x 2 today  ID anticipates prolonged IV antibiotic treatment after blood cultures cleared in setting of right foot osteomyelitis.  Anticipate eventual PICC placement.

## 2025-06-26 NOTE — ASSESSMENT & PLAN NOTE
RLE NWB  PT OT-recommend rehab placement  Case management is making rehab referrals-he is not currently medically stable for discharge is being treated for MSSA bacteremia

## 2025-06-26 NOTE — ASSESSMENT & PLAN NOTE
Likely secondary to R foot osteomyelitis. Patient's admission blood cultures with growth of MSSA in one. MSSA also present in R foot wound culture. He has no implanted devices or surgical hardware. The patient will need a TTE. He will need repeat blood cultures sent now. He has been receiving IV vancomycin and IV zosyn. Given his bacteremia, as well as R foot osteomyelitis, I will transition patient to regimen of Cefepime now.   -stop IV zosyn  -stop IV vancomycin   -start IV cefepime, 2g q12, dosed for renal function  -check CBCD and BMP tomorrow  -check repeat blood cultures now  -check TTE now  -monitor vitals  -anticipate prolonged course of IV antibiotic treatment after cleared blood cultures and in setting of R foot osteomyelitis below  -anticipate eventual PICC placement  -anticipate outpatient ID follow up after discharge

## 2025-06-26 NOTE — CONSULTS
Consultation - Infectious Disease   Name: Gold Van 79 y.o. male I MRN: [id number]  Unit/Bed#: -01 I Date of Admission: 6/17/2025   Date of Service: 6/26/2025 I Hospital Day: 9     Inpatient consult to Infectious Diseases  Consult performed by: BELGICA Murphy  Consult ordered by: BELGICA Nelson      Physician Requesting Evaluation: Binh Hurd DO   Reason for Evaluation / Principal Problem: MSSA bacteremia    Administrative Statements   VIRTUAL CARE DOCUMENTATION:     1. This service was provided via Telemedicine using S[employer] Kit     2. Parties in the room with patient during teleconsult Patient only    3. Confidentiality My office door was closed     4. Participants No one else was in the room    5. Patient acknowledged consent and understanding of privacy and security of the  Telemedicine consult. I informed the patient that I have reviewed their record in Epic and presented the opportunity for them to ask any questions regarding the visit today.  The patient agreed to participate.    6. I have spent a total time of 60 minutes in caring for this patient on the day of the visit/encounter including Diagnostic results, Instructions for management, Patient and family education, Impressions, Counseling / Coordination of care, Documenting in the medical record, Reviewing/placing orders in the medical record (including tests, medications, and/or procedures), Obtaining or reviewing history  , and Communicating with other healthcare professionals , not including the time spent for establishing the audio/video connection.     Assessment & Plan  Septic shock (HCC)  Leukocytosis, tachycardia, tachycardia, lactic acidosis, and hypotension requiring temporary pressor support. 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 No other clear source appreciated. The patient has clinically improved and no longer requires pressor support. He has transitioned out of ICU to med-surg  care. Patient's remained afebrile. His WBC count has trended down. He will need repeat blood cultures sent now.   -antibiotic as below  -check CBCD and BMP tomorrow  -follow up repeat blood cultures  -monitor vitals  -supportive care  MSSA bacteremia  Likely secondary to R foot osteomyelitis. Patient's admission blood cultures with growth of MSSA in one. MSSA also present in R foot wound culture. He has no implanted devices or surgical hardware. The patient will need a TTE. He will need repeat blood cultures sent now. He has been receiving IV vancomycin and IV zosyn. Given his bacteremia, as well as R foot osteomyelitis, I will transition patient to regimen of Cefepime now.   -stop IV zosyn  -stop IV vancomycin   -start IV cefepime, 2g q12, dosed for renal function  -check CBCD and BMP tomorrow  -check repeat blood cultures now  -check TTE now  -monitor vitals  -anticipate prolonged course of IV antibiotic treatment after cleared blood cultures and in setting of R foot osteomyelitis below  -anticipate eventual PICC placement  -anticipate outpatient ID follow up after discharge   Osteomyelitis of right foot (HCC)  Patient follows chronically without podiatry/wound management. 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 Now with MRI of the R foot showing soft tissue ulcer overlying the area of 4-5th ray surgery without underlying abscess, and marrow edema of the residual 4th and 5th metatarsals as well as the 3rd metatarsal head and 3rd proximal phalanx. Podiatry performed R TMA on 6/23/2025. Operative cultures with growth of MSSA, pseudomonas, and serratia. Surgical cure was not felt to be achieved. VAC therapy now ongoing at surgical site.  -antibiotic as above  -serial R foot exams  -local care/VAC care per podiatry  -continue follow up with podiatry   Type 2 diabetes mellitus with complication, without long-term current use of insulin (HCC)  Lab Results   Component Value Date    HGBA1C 6.6 (H)  06/18/2025   Elevated blood glucose is risk factor for wounds and infection.   -recommend tight glycemic control  -blood glucose management per primary service  Acute renal failure superimposed on stage 2 chronic kidney disease  (HCC)  Lab Results   Component Value Date    EGFR 81 06/26/2025    EGFR 82 06/25/2025    EGFR 67 06/24/2025    CREATININE 0.88 06/26/2025    CREATININE 0.87 06/25/2025    CREATININE 1.04 06/24/2025   This impacts antibiotic dosing. Upon review of patient's medical records it appears his baseline creatinine is approximately 0.7-0.9. Creatinine was 1.49 upon admission. Creatinine is now improved, was 0.88 on most recent BMP.  -monitor creatinine  -dose adjust antibiotic for renal function as needed  -avoid nephrotoxins  -volume management per primary service   Allergy to antibiotic  Patient reports SOB with ciprofloxacin and Bactrim. We will avoid these antibiotics for now.  -monitor patient for adverse medication reactions     Above plan was discussed in detail with patient over the TV kit.  Above plan was discussed in detail with SRI who agrees with plan for transition to Cefepime.    History   HPI: Gold Van is a 79 y.o. year old male who presented to the [place] ED on 6/17/2025 with fatigue and weakness. He was seen for a routine wound care appointment earlier in the day for chronic R foot osteomyelitis and was noted to be tachycardic and lethargic. Patient was transported to hospital by EMS.     Upon arrival to the ED the patient's temperature was 100.2. HR was 114. RR was 20. O2 saturation was 98% on room air. BP was 92/54. WBC count was 17.93. Lactic acid was 3.7. Creatinine was 1.49 with GFR = 44. No LFT elevations. Glucose was 237. Blood cultures were sent. Patient completed a CXR which showed no acute infectious cardiopulmonary findings. He completed an xray of the R foot which showed post-op changes of the 4-5th metatarsals. He was given ceftriaxone and vancomycin. He was  admitted to ICU for additional management.    Patient's BP dropped. Central line and a line were placed and he was started on pressor support. His antibiotic regimen was changed to vancomycin and zosyn. He was started on Solu-Cortef. He was taken for MRI of the R foot which showed post-op partial resection of 4th and 5th rays, soft tissue ulcer overlying the area of surgery without underlying abscess, and marrow edema of the residual 4th and 5th metatarsals as well as the 3rd metatarsal head and 3rd proximal phalanx. Podiatry was consulted. Patient was taken to the OR on 6/23/2025 for R TMA. Operative cultures were obtained which grew serratia, pseudomonas, and MSSA. There is not believed to be surgical cure of the osteomyelitis. Additionally patient's admission blood cultures also showing MSSA in one set. We have been asked for formal consult for MSSA bacteremia.    The patient has HTN, COPD, asthma, DMII, environmental allergies, macular degeneration, hyperlipidemia, and osteopenia. He has a history of sinusitis, osteomyelitis, pneumonia, COVID-19, stroke, R knee cartilage surgery, toe amputations, partial ray amputations, vasectomy, wisdom tooth extraction, L carpal tunnel release, colonoscopy, and cataract extraction. He is a former smoker. He has allergies to ciprofloxacin, Bactrim, dexamethasone, and tramcinolone.    Review of Systems  Patient reports he's feeling overall well today, much better than when he arrived at the hospital. He has no fever, chills, sweats, shakes. He's tolerating oral intake without difficulty. Has not had nausea, vomiting, or diarrhea. He has noticed a bit of a cough. No chest pain. He has not concern with his R foot wound or the dressing today. Rest of complete 12 point system-based review of systems is otherwise negative.    PAST MEDICAL HISTORY:  Diagnosis Date    Asthma     COPD (chronic obstructive pulmonary disease) ([facility])     COVID-19     CVA (cerebral vascular accident) ([facility])      Diabetes mellitus (HCC)     Environmental allergies     Hyperlipidemia     Hypertension     Macular degeneration 07/13/2020    right eye    Orthostatic hypotension     Osteopenia     Pneumonia     Pneumothorax     Sinusitis      Procedure Laterality Date    CARPAL TUNNEL RELEASE Left 08/2024    CATARACT EXTRACTION Left 07/2024    COLONOSCOPY      KNEE CARTILAGE SURGERY Right 1964    DC AMPUTATION FOOT TRANSMETARSAL Right 6/23/2025    Procedure: AMPUTATION TRANSMETATARSAL (TMA);  Surgeon: Agustin Galeas DPM;  Location: [facility];  Service: Podiatry    DC AMPUTATION METATARSAL W/TOE SINGLE Right 05/16/2025    Procedure: RAY RESECTION FOOT - R partial 5th ray resection;  Surgeon: Reinaldo Brewer DPM;  Location: [facility];  Service: Podiatry    DC AMPUTATION METATARSAL W/TOE SINGLE Right 05/23/2025    Procedure: Right partial 4th ray amputation, wound debridement;  Surgeon: Brandon Phillips DPM;  Location: [facility];  Service: Podiatry    DC TEAEC W/WO PATCH GRAFT COMMON FEMORAL Right 05/13/2025    Procedure: Right common femoral endarterectomy with bovine pericardial patch Right lower extremity angiogram Third order cannulation of the right anterior tibial artery Balloon angioplasty of the right anterior tibial with a 3x220 Fernando balloon DCB angioplasty of the SFA with a 6x150 Talbott balloon Stenting of the right SFA with two 6x150 Grazyna stents, 6x5cm Viabahn, and a 7x5cm viabahn Post-di    VASECTOMY      WISDOM TOOTH EXTRACTION      x4     FAMILY HISTORY:  Non-contributory    SOCIAL HISTORY:  Social History   /Civil Union  Social History     Substance and Sexual Activity   Alcohol Use Never     Social History     Substance and Sexual Activity   Drug Use Never     Tobacco Use   Smoking Status Former   Smokeless Tobacco Never   Tobacco Comments    quit about 25 years ago     ALLERGIES:  Allergen Reactions    Ciprofloxacin Shortness Of Breath    Sulfamethoxazole Shortness Of Breath     Trimethoprim Shortness Of Breath    Dexamethasone Other (See Comments)    Triamcinolone Other (See Comments)    Bactrim [Sulfamethoxazole-Trimethoprim] Fever     Fever of 107     MEDICATIONS:  All current active medications have been reviewed.    ANTIBIOTICS:  Zosyn - stop  Vancomycin - stop  Cefepime 1  Antibiotics 10    Physical Exam   Temp:  [98 °F (36.7 °C)-99.1 °F (37.3 °C)] 98.3 °F (36.8 °C)  HR:  [] 90  Resp:  [16-21] 20  BP: (114-164)/(68-83) 114/72  SpO2:  [95 %-97 %] 95 %  Temp (24hrs), Av.5 °F (36.9 °C), Min:98 °F (36.7 °C), Max:99.1 °F (37.3 °C)  Current: Temperature: 98.3 °F (36.8 °C)    Intake/Output Summary (Last 24 hours) at 2025 0962  Last data filed at 2025 1946  Gross per 24 hour   Intake 820 ml   Output 1000 ml   Net -180 ml     Physical exam findings reported by bedside and primary medical team staff, also observed over TV kit:  General Appearance:  Appearing well, nontoxic, and in no distress.   Head:  Normocephalic, without obvious abnormality, atraumatic.   Eyes:  Conjunctiva pink and sclera anicteric, both eyes.   Nose: Nares normal, mucosa normal, no drainage.   Throat: Oropharynx moist without lesions.   Neck: Supple, symmetrical, no adenopathy, no tenderness/mass/nodules.   Lungs:   Clear to auscultation bilaterally, respirations unlabored on room air.   Heart:  Tachycardic; no murmur, rub or gallop.   Abdomen:   Soft, non-tender, non-distended.   Extremities: R foot dressing clean and intact.   Skin: No rashes noted on exposed skin.   Neurologic: Alert and oriented times 3, follows commands, speech is organized and appropriate, symmetric facial movement.     Invasive Devices:   Peripheral IV 25 Left;Upper;Ventral (anterior) Arm (Active)   Site Assessment WDL;Clean;Dry 25   Dressing Type Transparent 25   Line Status Infusing 25   Dressing Status Clean;Dry;Intact 25   Dressing Change Due 25   Reason  Not Rotated Not due 06/25/25 2037       Peripheral IV 06/26/25 Left;Ventral (anterior) Forearm (Active)     Labs, Imaging, & Other Studies   I have personally reviewed pertinent labs.    Results from last 7 days   Lab Units 06/26/25  0756 06/25/25  0605 06/24/25  0616   WBC Thousand/uL 8.39 7.23 9.20   HEMOGLOBIN g/dL 9.8* 9.6* 9.7*   PLATELETS Thousands/uL 300 273 295     Results from last 7 days   Lab Units 06/26/25  0626   POTASSIUM mmol/L 4.3   CHLORIDE mmol/L 100   CO2 mmol/L 21   BUN mg/dL 15   CREATININE mg/dL 0.88   EGFR ml/min/1.73sq m 81   CALCIUM mg/dL 9.2     Results from last 7 days   Lab Units 06/23/25  1454   GRAM STAIN RESULT  No Polys or Bacteria seen  No Polys or Bacteria seen   WOUND CULTURE  3 colonies Serratia marcescens*  3 colonies Pseudomonas aeruginosa*  2 colonies Staphylococcus aureus*  2 colonies  2+ Growth of Staphylococcus aureus*     Imaging Studies:   I have personally reviewed pertinent imaging study reports and images in PACS:    XR CHEST PORTABLE 6/17/2025 11:34 - I personally reviewed this study which showed no acute infectious cardiopulmonary findings.    XR FOOT 3+ VIEWS RIGHT, TIBIA FIBULA 2 VIEWS 6/17/2025 - I personally reviewed this study which showed post-surgical partial resection of 4th and 5th rays to the mid-metatarsal level, no acute fracture, degenerative changes in the midfoot and tibiotalar joint, and wound adjacent to the metatarsals laterally in the foot.    XR CHEST PORTABLE 15:12 - I personally reviewed this study which showed placement of R IJ central line. No acute infectious cardiopulmonary findings.     MRI FOOT/FOREFOOT TOES RIGHT WO CONTRAST 6/18/2025 - I personally reviewed this study which showed patient is post-op partial resection of 4th and 5th rays, soft tissue ulcer overlying the area of surgery without underlying abscess, and marrow edema of the residual 4th and 5th metatarsals as well as the 3rd metatarsal head and 3rd proximal phalanx.    XR  FOOT RIGHT 3+ VIEWS 6/23/2025 - I personally reviewed this study which showed patient is now s/p R TMA, degenerative changes of the midfoot and tibiotalar joint.

## 2025-06-26 NOTE — ASSESSMENT & PLAN NOTE
Patient reports SOB with ciprofloxacin and Bactrim. We will avoid these antibiotics for now.  -monitor patient for adverse medication reactions

## 2025-06-26 NOTE — DISCHARGE SUPPORT
Case Management Assessment & Discharge Planning Note    Patient name Gold Van  Location /-01 MRN [id number]  : 1945 Date 2025       Current Admission Date: 2025  Current Admission Diagnosis:Septic shock ([facility])   Patient Active Problem List    Diagnosis Date Noted    Pressure injury of skin of sacral region 2025    Metabolic acidosis 2025    Ambulatory dysfunction 2025    Osteomyelitis ([facility]) 2025    Anemia 2025    Pulmonary hypertension ([facility]) 2025    Septic shock ([facility]) 2025    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 ([facility]) 2025    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 ([facility]) 2025    PAF (paroxysmal atrial fibrillation) ([facility]) 2025    Chronic heart failure with preserved ejection fraction (HFpEF) ([facility]) 2025    Ulcer of right foot with fat layer exposed secondary to osteomyelitis 2025    Severe peripheral arterial disease ([facility]) 2024    Moderate protein-calorie malnutrition ([facility]) 10/09/2024    Gastroesophageal reflux disease without esophagitis 10/04/2024    Neuropathy 10/04/2024    Foot drop, left 10/04/2024    CVA (cerebrovascular accident) ([facility]) 10/02/2024    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    History of pneumothorax 2024    Non-recurrent bilateral inguinal hernia without obstruction or gangrene 2024    Pruritic condition 2024    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 2021    Acute renal failure superimposed on stage 2 chronic kidney disease  ([facility]) 2021    Hyponatremia 2021    Lung nodule 2021    Type 2 diabetes mellitus with complication, without long-term current use of insulin ([facility]) 10/23/2017    Essential hypertension 10/23/2017    Mild intermittent asthma without complication 10/23/2017    Mixed hyperlipidemia 10/23/2017      LOS (days): 9  Geometric Mean LOS (GMLOS)  (days): 4.9  Days to GMLOS:-3.9   Facility Auth Adverse Decision  DC Support Center has received: Denial  For Level of Care: Acute Rehab  [facility]  Information obtained via : Phone  Name/Phone # of Rep who called in information: Sushma BAIRON#: [phone]  Reason for Adverse Decision: Does not meet criteria  Reference Number: [id number]  Facility Name: [facility]  Peer to Peer?: Offered  Peer to Peer Phone #: [phone]  P2P Deadline: 06/26 by 4pm  For P2P Completion:: CM to task P2P to attending to complete if desired  Other Notes: Per rep, courtesy SNF auth was provided if patient wishes to pursue SNF: [id number]   notified: Marsha Grande     Updates to authorization status will be noted in chart.    Please reach out to CM for updates on any clinical information.

## 2025-06-26 NOTE — ASSESSMENT & PLAN NOTE
Baseline hemoglobin appears to be 8.5-9.5  Received 1 UPRBC 6/18 and 6/23  Hemoglobin stable and baseline, no signs of bleeding  CBC a.m.

## 2025-06-26 NOTE — ASSESSMENT & PLAN NOTE
Noted on MRI imaging  Patient follows with podiatry outpatient for chronic wounds, podiatry continues to follow inpatient  S/p right TMA on 6/2325  Operative cultures with MSSA, Pseudomonas and Serratia  Appreciate ID who are following-vancomycin and Zosyn discontinued today  Cefepime initiated by ID today  Follow-up blood cultures were drawn today  ID anticipating need long-term antibiotics and eventual PICC placement

## 2025-06-26 NOTE — ASSESSMENT & PLAN NOTE
Lab Results   Component Value Date    HGBA1C 6.6 (H) 06/18/2025       Recent Labs     06/25/25  1546 06/25/25  2034 06/26/25  0802 06/26/25  1059   POCGLU 325* 290* 186* 274*       Blood Sugar Average: Last 72 hrs:  (P) [id number]  Hold prehospital metformin and glipizide while inpatient resume on discharge  Blood sugar results reviewed  Continue sliding scale insulin coverage ACHS  Will add 5 units of Lantus at bedtime  CHO diet  Hypoglycemia protocol  Continue to monitor blood sugars and adjust regimen accordingly  BMP a.m.

## 2025-06-26 NOTE — NURSING NOTE
No changes in the pts assess throughout the day, spent several hours oob in his chair and tolerated well, presently in bed in no acute distress watching tv

## 2025-06-26 NOTE — ASSESSMENT & PLAN NOTE
Lab Results   Component Value Date    EGFR 81 06/26/2025    EGFR 82 06/25/2025    EGFR 67 06/24/2025    CREATININE 0.88 06/26/2025    CREATININE 0.87 06/25/2025    CREATININE 1.04 06/24/2025   This impacts antibiotic dosing. Upon review of patient's medical records it appears his baseline creatinine is approximately 0.7-0.9. Creatinine was 1.49 upon admission. Creatinine is now improved, was 0.88 on most recent BMP.  -monitor creatinine  -dose adjust antibiotic for renal function as needed  -avoid nephrotoxins  -volume management per primary service

## 2025-06-26 NOTE — PLAN OF CARE
Problem: Prexisting or High Potential for Compromised Skin Integrity  Goal: Skin integrity is maintained or improved  Description: INTERVENTIONS:  - Identify patients at risk for skin breakdown  - Assess and monitor skin integrity including under and around medical devices   - Assess and monitor nutrition and hydration status  - Monitor labs  - Assess for incontinence   - Turn and reposition patient  - Assist with mobility/ambulation  - Relieve pressure over joycelyn prominences   - Avoid friction and shearing  - Provide appropriate hygiene as needed including keeping skin clean and dry  - Evaluate need for skin moisturizer/barrier cream  - Collaborate with interdisciplinary team  - Patient/family teaching  - Consider wound care consult    Assess:  - Review Sae scale daily  - Clean and moisturize skin every shift  - Inspect skin when repositioning, toileting, and assisting with ADLS  - Assess under medical devices   - Assess extremities for adequate circulation and sensation     Bed Management:  - Have minimal linens on bed & keep smooth, unwrinkled  - Change linens as needed when moist or perspiring  - Avoid sitting or lying in one position for more than 2 hours while in bed?Keep HOB at 30 degrees   - Toileting:  - Offer bedside commode  - Assess for incontinence   - Use incontinent care products after each incontinent episode     Activity:  - Mobilize patient 3 times a day  - Encourage activity and walks on unit  - Encourage or provide ROM exercises   - Turn and reposition patient every 2 Hours  - Use appropriate equipment to lift or move patient in bed  - Instruct/ Assist with weight shifting every 60 when out of bed in chair  - Consider limitation of chair time 2 hour intervals    Skin Care:  - Avoid use of baby powder, tape, friction and shearing, hot water or constrictive clothing  - Relieve pressure over bony prominences using mepelex  - Do not massage red bony areas    Next Steps:  - Teach patient  strategies to minimize risks  - Consider consults to  interdisciplinary teams   Outcome: Progressing     Problem: PAIN - ADULT  Goal: Verbalizes/displays adequate comfort level or baseline comfort level  Description: Interventions:  - Encourage patient to monitor pain and request assistance  - Assess pain using appropriate pain scale  - Administer analgesics as ordered based on type and severity of pain and evaluate response  - Implement non-pharmacological measures as appropriate and evaluate response  - Consider cultural and social influences on pain and pain management  - Notify physician/advanced practitioner if interventions unsuccessful or patient reports new pain  - Educate patient/family on pain management process including their role and importance of  reporting pain   - Provide non-pharmacologic/complimentary pain relief interventions  Outcome: Progressing     Problem: INFECTION - ADULT  Goal: Absence or prevention of progression during hospitalization  Description: INTERVENTIONS:  - Assess and monitor for signs and symptoms of infection  - Monitor lab/diagnostic results  - Monitor all insertion sites, i.e. indwelling lines, tubes, and drains  - Monitor endotracheal if appropriate and nasal secretions for changes in amount and color  - Hubbell appropriate cooling/warming therapies per order  - Administer medications as ordered  - Instruct and encourage patient and family to use good hand hygiene technique  - Identify and instruct in appropriate isolation precautions for identified infection/condition  Outcome: Progressing  Goal: Absence of fever/infection during neutropenic period  Description: INTERVENTIONS:  - Monitor WBC  - Perform strict hand hygiene  - Limit to healthy visitors only  - No plants, dried, fresh or silk flowers with otoole in patient room  Outcome: Progressing     Problem: SAFETY ADULT  Goal: Patient will remain free of falls  Description: INTERVENTIONS:  - Educate patient/family on  patient safety including physical limitations  - Instruct patient to call for assistance with activity   - Consider consulting OT/PT to assist with strengthening/mobility based on AM PAC & JH-HLM score  - Consult OT/PT to assist with strengthening/mobility   - Keep Call bell within reach  - Keep bed low and locked with side rails adjusted as appropriate  - Keep care items and personal belongings within reach  - Initiate and maintain comfort rounds  - Make Fall Risk Sign visible to staff  - Offer Toileting every 2 Hours, in advance of need  - Initiate/Maintain bed alarm  - Obtain necessary fall risk management equipment:   - Apply yellow socks and bracelet for high fall risk patients  - Consider moving patient to room near nurses station  Outcome: Progressing  Goal: Maintain or return to baseline ADL function  Description: INTERVENTIONS:  -  Assess patient's ability to carry out ADLs; assess patient's baseline for ADL function and identify physical deficits which impact ability to perform ADLs (bathing, care of mouth/teeth, toileting, grooming, dressing, etc.)  - Assess/evaluate cause of self-care deficits   - Assess range of motion  - Assess patient's mobility; develop plan if impaired  - Assess patient's need for assistive devices and provide as appropriate  - Encourage maximum independence but intervene and supervise when necessary  - Involve family in performance of ADLs  - Assess for home care needs following discharge   - Consider OT consult to assist with ADL evaluation and planning for discharge  - Provide patient education as appropriate  - Monitor functional capacity and physical performance, use of AM PAC & JH-HLM   - Monitor gait, balance and fatigue with ambulation    Outcome: Progressing  Goal: Maintains/Returns to pre admission functional level  Description: INTERVENTIONS:  - Perform AM-PAC 6 Click Basic Mobility/ Daily Activity assessment daily.  - Set and communicate daily mobility goal to care team  and patient/family/caregiver.   - Collaborate with rehabilitation services on mobility goals if consulted  - Perform Range of Motion 3 times a day.  - Reposition patient every 2 hours.  - Dangle patient 3 times a day  - Stand patient 3 times a day  - Ambulate patient 3 times a day  - Out of bed for meals   - Out of bed for toileting  - Record patient progress and toleration of activity level   Outcome: Progressing

## 2025-06-26 NOTE — ASSESSMENT & PLAN NOTE
Malnutrition Findings:        Nutrition following  Monitor appetites                       BMI Findings:           Body mass index is 18.58 kg/m².

## 2025-06-26 NOTE — PROGRESS NOTES
Progress Note - Hospitalist   Name: Gold Van 79 y.o. male I MRN: [id number]  Unit/Bed#: -01 I Date of Admission: 6/17/2025   Date of Service: 6/26/2025 I Hospital Day: 9    Assessment & Plan  Septic shock ([facility])  SHOCK STATE RESOLVED  Source: RLE osteo  Hx serratia/pseudomonas/e faecalis wound infection in past  MRI R foot-Postsurgical changes of partial fourth and fifth ray resections with a soft tissue ulcer overlying the cut surfaces of the fourth and fifth metatarsals and findings concerning for early osteomyelitis in the residual fourth metatarsal. No definite MRI evidence of osteomyelitis. The ulcer is also in close approximation to the distal third metatarsal, and early osteomyelitis in that location cannot be excluded. No evidence of an abscess  S/p R TMA 6/23/2025  1 of 2 blood cultures on arrival with MSSA  Wound culture with MSSA, Pseudomonas and Serratia  ID following, discontinued vancomycin and Zosyn today and started cefepime.  ID feels currently on long-term antibiotics  TTE was ordered  Follow-up blood cultures were drawn today  Case management continues to follow.  Rehab referrals have been made.   Acute renal failure superimposed on stage 2 chronic kidney disease  ([facility])  Lab Results   Component Value Date    EGFR 81 06/26/2025    EGFR 82 06/25/2025    EGFR 67 06/24/2025    CREATININE 0.88 06/26/2025    CREATININE 0.87 06/25/2025    CREATININE 1.04 06/24/2025     POA with creatinine 1.49  Likely prerenal in setting of shock state which is now resolved  Creatinine currently at baseline, euvolemic on exam  Type 2 diabetes mellitus with complication, without long-term current use of insulin ([facility])  Lab Results   Component Value Date    HGBA1C 6.6 (H) 06/18/2025       Recent Labs     06/25/25  1546 06/25/25  2034 06/26/25  0802 06/26/25  1059   POCGLU 325* 290* 186* 274*       Blood Sugar Average: Last 72 hrs:  (P) [id number]  Hold prehospital metformin and glipizide while inpatient  resume on discharge  Blood sugar results reviewed  Continue sliding scale insulin coverage ACHS  Will add 5 units of Lantus at bedtime  CHO diet  Hypoglycemia protocol  Continue to monitor blood sugars and adjust regimen accordingly  BMP a.m.  Essential hypertension  Shock state resolved  lisinopril and atenolol resumed 6/25 at prehospital doses  Monitor BP per protocol  CVA (cerebrovascular accident) ([facility])  No residual deficits  Continue home statin and Plavix  PAF (paroxysmal atrial fibrillation) ([facility])  Resumed prehospital atenolol 6/25,  shock state resolved  Had episodes of atrial fibrillation with RVR 6/21 treated with 3 doses of metoprolol IV followed by Cardizem.  No further episodes  Continue prehospital Eliquis  Mixed hyperlipidemia  Continue home statin  Hyponatremia  Chronically 130-134  Currently at baseline  Euvolemic on exam  Continue to trend, BMP a.m.  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  Pressure injury of skin of sacral region  Continue wound care per protocol  Metabolic acidosis  Resolved  Ambulatory dysfunction  RLE NWB  PT OT-recommend rehab placement  Case management is making rehab referrals-he is not currently medically stable for discharge is being treated for MSSA bacteremia  Moderate protein-calorie malnutrition (HCC)  Malnutrition Findings:        Nutrition following  Monitor appetites                       BMI Findings:           Body mass index is 18.58 kg/m².     MSSA bacteremia  Appreciate ID who are following  1/2 blood culture on admission with MSSA bacteremia  Patient was noted to have MSSA  and soft tissue culture with MSSA, Pseudomonas and Serratia  ID feels this is likely true bacteremia  TTE was ordered  IV Zosyn and vancomycin were discontinued by ID.  Patient was initiated on cefepime 2 g IV every 12 hours dosed for renal function  Follow-up blood cultures x 2 today  ID anticipates prolonged IV antibiotic treatment after blood cultures cleared in setting of right foot osteomyelitis.  Anticipate eventual PICC placement.    Osteomyelitis of right foot (HCC)  Noted on MRI imaging  Patient follows with podiatry outpatient for chronic wounds, podiatry continues to follow inpatient  S/p right TMA on 6/2325  Operative cultures with MSSA, Pseudomonas and Serratia  Appreciate ID who are following-vancomycin and Zosyn discontinued today  Cefepime initiated by ID today  Follow-up blood cultures were drawn today  ID anticipating need long-term antibiotics and eventual PICC placement  Allergy to antibiotic  Appreciate ID who are following for MSSA bacteremia  Avoid ciprofloxacin and Bactrim as patient reports shortness of breath with these antibiotics    VTE Pharmacologic Prophylaxis: VTE Score: 3 Moderate Risk (Score 3-4) - Pharmacological DVT Prophylaxis Ordered: apixaban (Eliquis).    Mobility:   Basic Mobility Inpatient Raw Score: 8  -[facility] Goal: 3: Sit at edge of bed  JH-HLM Achieved: 4: Move to chair/commode  JH-HLM Goal achieved. Continue to encourage appropriate mobility.    Patient Centered Rounds: I performed bedside rounds with nursing staff today.   Discussions with Specialists or Other Care Team Provider: ID, podiatry, nursing, case management    Education and Discussions with Family / Patient: Updated  (wife) at bedside.    Current Length of Stay: 9 day(s)  Current Patient Status: Inpatient   Certification Statement: The patient will continue to require additional inpatient hospital stay due to MSSA bacteremia treating with IV antibiotics, ID following, repeat cultures today.  ID  suspects will need long-term antibiotics and PICC, pending TTE, repeat labs a.m.  Discharge Plan: Patient being treated for MSSA bacteremia ID is following.  IV antibiotics were changed today.  Repeat blood cultures were drawn.  ID and podiatry continue to follow.  Repeat labs in the AM.  Patient will likely need long-term IV antibiotics.  Case management continues to follow for discharge planning-insurance going to decline him for [facility] admit, however, as he now has increased medical necessity they may actually reconsider when medically stable.  Repeat labs in the AM.    Code Status: Level 1 - Full Code    Subjective   Denies any dizziness, lightness, chest pain or palpitations.  Denies pain or discomfort.  Wife visiting.    Objective :  Temp:  [98 °F (36.7 °C)-98.8 °F (37.1 °C)] 98.3 °F (36.8 °C)  HR:  [] 90  BP: (114-159)/(68-83) 114/72  Resp:  [16-21] 20  SpO2:  [95 %-97 %] 95 %  O2 Device: None (Room air)    Body mass index is 18.58 kg/m².     Input and Output Summary (last 24 hours):     Intake/Output Summary (Last 24 hours) at 6/26/2025 1440  Last data filed at 6/26/2025 1136  Gross per 24 hour   Intake 660 ml   Output 600 ml   Net 60 ml       Physical Exam  Vitals reviewed.   Constitutional:       General: He is not in acute distress.     Appearance: He is well-developed.   HENT:      Head: Normocephalic and atraumatic.     Cardiovascular:      Rate and Rhythm: Normal rate and regular rhythm.      Heart sounds: No murmur heard.  Pulmonary:      Effort: Pulmonary effort is normal. No respiratory distress.      Breath sounds: Normal breath sounds. No wheezing or rales.   Abdominal:      General: There is no distension.      Palpations: Abdomen is soft.      Tenderness: There is no abdominal tenderness. There is no guarding.     Musculoskeletal:         General: No swelling.     Skin:     General: Skin is warm and dry.      Capillary Refill: Capillary refill takes less than 2 seconds.      Comments: Right  foot dressing intact     Neurological:      General: No focal deficit present.      Mental Status: He is alert and oriented to person, place, and time. Mental status is at baseline.     Psychiatric:         Mood and Affect: Mood normal.         Behavior: Behavior normal.         Thought Content: Thought content normal.         Judgment: Judgment normal.           Lines/Drains:              Lab Results: I have reviewed the following results:   Results from last 7 days   Lab Units 06/26/25  0756 06/25/25  0605   WBC Thousand/uL 8.39 7.23   HEMOGLOBIN g/dL 9.8* 9.6*   HEMATOCRIT % 30.9* 29.8*   PLATELETS Thousands/uL 300 273   SEGS PCT %  --  74   LYMPHO PCT %  --  17   MONO PCT %  --  7   EOS PCT %  --  1     Results from last 7 days   Lab Units 06/26/25  0626   SODIUM mmol/L 130*   POTASSIUM mmol/L 4.3   CHLORIDE mmol/L 100   CO2 mmol/L 21   BUN mg/dL 15   CREATININE mg/dL 0.88   ANION GAP mmol/L 9   CALCIUM mg/dL 9.2   GLUCOSE RANDOM mg/dL 185*         Results from last 7 days   Lab Units 06/26/25  1059 06/26/25  0802 06/25/25  2034 06/25/25  1546 06/25/25  1054 06/25/25  0718 06/24/25  2131 06/24/25  1604 06/24/25  1211 06/24/25  0956 06/24/25  0801 06/24/25  0554   POC GLUCOSE mg/dl 274* 186* 290* 325* 252* 169* 240* 347* 263* 203* 124 131                 Recent Cultures (last 7 days):   Results from last 7 days   Lab Units 06/23/25  1454   GRAM STAIN RESULT  No Polys or Bacteria seen  No Polys or Bacteria seen   WOUND CULTURE  3 colonies Serratia marcescens*  3 colonies Pseudomonas aeruginosa*  2 colonies Staphylococcus aureus*  2 colonies  2+ Growth of Staphylococcus aureus*       Imaging Results Review: I reviewed radiology reports from this admission including: MRI right foot, right foot x-ray.  Other Study Results Review: No additional pertinent studies reviewed.    Last 24 Hours Medication List:     Current Facility-Administered Medications:     acetaminophen (TYLENOL) tablet 650 mg, Q6H PRN     albuterol (PROVENTIL HFA,VENTOLIN HFA) inhaler 2 puff, Q4H PRN    albuterol inhalation solution 2.5 mg, Q6H PRN    apixaban (ELIQUIS) tablet 5 mg, BID    atenolol (TENORMIN) tablet 25 mg, Daily    atorvastatin (LIPITOR) tablet 40 mg, QPM    Budeson-Glycopyrrol-Formoterol 160-9-4.8 MCG/ACT AERO 2 puff, Q12H SANDRA    ceFEPime (MAXIPIME) 2,000 mg in dextrose 5 % 50 mL IVPB, Q12H, Last Rate: 2,000 mg (06/26/25 1213)    clopidogrel (PLAVIX) tablet 75 mg, Daily    diltiazem (CARDIZEM) injection 15 mg, Once    diphenhydrAMINE (BENADRYL) injection 25 mg, Q6H PRN    famotidine (PEPCID) tablet 20 mg, Daily    heparin (porcine) injection 1,800 Units, Q6H PRN    heparin (porcine) injection 3,600 Units, Q6H PRN    HYDROmorphone HCl (DILAUDID) injection 0.2 mg, Q2H PRN    insulin lispro (HumALOG/ADMELOG) 100 units/mL subcutaneous injection 1-5 Units, TID AC **AND** Fingerstick Glucose (POCT), TID AC    insulin lispro (HumALOG/ADMELOG) 100 units/mL subcutaneous injection 1-5 Units, HS    lisinopril (ZESTRIL) tablet 5 mg, Daily    moisture barrier miconazole 2% cream (aka CALEB MOISTURE BARRIER ANTIFUNGAL CREAM), BID    montelukast (SINGULAIR) tablet 10 mg, HS    multivitamin-minerals (CENTRUM) tablet 1 tablet, Daily    naloxone (NARCAN) 0.04 mg/mL syringe 0.04 mg, Q1MIN PRN    ondansetron (ZOFRAN) injection 4 mg, Q6H PRN    oxyCODONE (ROXICODONE) split tablet 2.5 mg, Q4H PRN **OR** oxyCODONE (ROXICODONE) IR tablet 5 mg, Q4H PRN    polyethylene glycol (MIRALAX) packet 17 g, Daily PRN    predniSONE tablet 5 mg, Daily    senna-docusate sodium (SENOKOT S) 8.6-50 mg per tablet 2 tablet, HS    Administrative Statements   Today, Patient Was Seen By: BELGICA Nelson  I have spent a total time of 40 minutes in caring for this patient on the day of the visit/encounter including Patient and family education, Counseling / Coordination of care, Documenting in the medical record, Reviewing/placing orders in the medical record (including  tests, medications, and/or procedures), Obtaining or reviewing history  , and Communicating with other healthcare professionals .    **Please Note: This note may have been constructed using a voice recognition system.**

## 2025-06-26 NOTE — ASSESSMENT & PLAN NOTE
Shock state resolved  lisinopril and atenolol resumed 6/25 at prehospital doses  Monitor BP per protocol

## 2025-06-26 NOTE — CASE MANAGEMENT
Case Management Discharge Planning Note    Patient name Gold Van  Location /-01 MRN [id number]  : 1945 Date 2025       Current Admission Date: 2025  Current Admission Diagnosis:Septic shock ([facility])   Patient Active Problem List    Diagnosis Date Noted    MSSA bacteremia 2025    Osteomyelitis of right foot ([facility]) 2025    Allergy to antibiotic 2025    Pressure injury of skin of sacral region 2025    Metabolic acidosis 2025    Ambulatory dysfunction 2025    Osteomyelitis ([facility]) 2025    Anemia 2025    Pulmonary hypertension ([facility]) 2025    Septic shock ([facility]) 2025    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 ([facility]) 2025    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 ([facility]) 2025    PAF (paroxysmal atrial fibrillation) ([facility]) 2025    Chronic heart failure with preserved ejection fraction (HFpEF) ([facility]) 2025    Ulcer of right foot with fat layer exposed secondary to osteomyelitis 2025    Severe peripheral arterial disease ([facility]) 2024    Moderate protein-calorie malnutrition ([facility]) 10/09/2024    Gastroesophageal reflux disease without esophagitis 10/04/2024    Neuropathy 10/04/2024    Foot drop, left 10/04/2024    CVA (cerebrovascular accident) ([facility]) 10/02/2024    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[facility]) 2024    History of pneumothorax 2024    Non-recurrent bilateral inguinal hernia without obstruction or gangrene 2024    Pruritic condition 2024    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 2021    Acute renal failure superimposed on stage 2 chronic kidney disease  ([facility]) 2021    Hyponatremia 2021    Lung nodule 2021    Type 2 diabetes mellitus with complication, without long-term current use of insulin ([facility]) 10/23/2017    Essential hypertension 10/23/2017    Mild intermittent asthma without  complication 10/23/2017    Mixed hyperlipidemia 10/23/2017      LOS (days): 9  Geometric Mean LOS (GMLOS) (days): 4.9  Days to GMLOS:-4.2     OBJECTIVE:  Risk of Unplanned Readmission Score: 32.91         Current admission status: Inpatient   Preferred Pharmacy:   [facility] [place] - [address]  [address] [facility] [address]  Phone: [phone] Fax: [phone]    [facility] [place] - [address]  [address] [facility] [address]  Phone: [phone] Fax: [phone]    [facility] #[id number] [place], [place] - [address] [address]  Phone: [phone] Fax: [phone]    Primary Care Provider: Shayne Diaz MD    [facility] Insurance: [facility]  Secondary Insurance:     DISCHARGE DETAILS:  AS per SLIM the pt is not ready pending cultures.  May need IVABX.  Was accepted to [facility], however was denied.  [facility] did not want to do a peer to peer at this time as he is not now ready for DC.  PT may require IVABX.  A courtesy SNF auth was provided, however pt not ready for DC.  Referrals made via [facility] for STR when medically stable.

## 2025-06-26 NOTE — ASSESSMENT & PLAN NOTE
Lab Results   Component Value Date    HGBA1C 6.6 (H) 06/18/2025   Elevated blood glucose is risk factor for wounds and infection.   -recommend tight glycemic control  -blood glucose management per primary service

## 2025-06-26 NOTE — CONSULTS
Vancomycin IV Pharmacy-to-Dose Consultation     Vancomycin has been discontinued.  Pharmacy will sign off.  Please contact or re-consult with questions.    Laith Higgins, Pharmacist

## 2025-06-26 NOTE — PLAN OF CARE
Problem: Prexisting or High Potential for Compromised Skin Integrity  Goal: Skin integrity is maintained or improved  Description: INTERVENTIONS:  - Identify patients at risk for skin breakdown  - Assess and monitor skin integrity including under and around medical devices   - Assess and monitor nutrition and hydration status  - Monitor labs  - Assess for incontinence   - Turn and reposition patient  - Assist with mobility/ambulation  - Relieve pressure over joycelyn prominences   - Avoid friction and shearing  - Provide appropriate hygiene as needed including keeping skin clean and dry  - Evaluate need for skin moisturizer/barrier cream  - Collaborate with interdisciplinary team  - Patient/family teaching  - Consider wound care consult    Assess:  - Review Sae scale daily  - Clean and moisturize skin every shift  - Inspect skin when repositioning, toileting, and assisting with ADLS  - Assess under medical devices   - Assess extremities for adequate circulation and sensation     Bed Management:  - Have minimal linens on bed & keep smooth, unwrinkled  - Change linens as needed when moist or perspiring  - Avoid sitting or lying in one position for more than 2 hours while in bed?Keep HOB at 30 degrees   - Toileting:  - Offer bedside commode  - Assess for incontinence   - Use incontinent care products after each incontinent episode     Activity:  - Mobilize patient 3 times a day  - Encourage activity and walks on unit  - Encourage or provide ROM exercises   - Turn and reposition patient every 2 Hours  - Use appropriate equipment to lift or move patient in bed  - Instruct/ Assist with weight shifting every 60 when out of bed in chair  - Consider limitation of chair time 2 hour intervals    Skin Care:  - Avoid use of baby powder, tape, friction and shearing, hot water or constrictive clothing  - Relieve pressure over bony prominences using mepelex  - Do not massage red bony areas    Next Steps:  - Teach patient  strategies to minimize risks  - Consider consults to  interdisciplinary teams   Outcome: Progressing     Problem: INFECTION - ADULT  Goal: Absence or prevention of progression during hospitalization  Description: INTERVENTIONS:  - Assess and monitor for signs and symptoms of infection  - Monitor lab/diagnostic results  - Monitor all insertion sites, i.e. indwelling lines, tubes, and drains  - Monitor endotracheal if appropriate and nasal secretions for changes in amount and color  - [place] appropriate cooling/warming therapies per order  - Administer medications as ordered  - Instruct and encourage patient and family to use good hand hygiene technique  - Identify and instruct in appropriate isolation precautions for identified infection/condition  Outcome: Progressing  Goal: Absence of fever/infection during neutropenic period  Description: INTERVENTIONS:  - Monitor WBC  - Perform strict hand hygiene  - Limit to healthy visitors only  - No plants, dried, fresh or silk flowers with otoole in patient room  Outcome: Progressing     Problem: SAFETY ADULT  Goal: Patient will remain free of falls  Description: INTERVENTIONS:  - Educate patient/family on patient safety including physical limitations  - Instruct patient to call for assistance with activity   - Consider consulting OT/PT to assist with strengthening/mobility based on AM PAC & JH-HLM score  - Consult OT/PT to assist with strengthening/mobility   - Keep Call bell within reach  - Keep bed low and locked with side rails adjusted as appropriate  - Keep care items and personal belongings within reach  - Initiate and maintain comfort rounds  - Make Fall Risk Sign visible to staff  - Offer Toileting every 2 Hours, in advance of need  - Initiate/Maintain bed alarm  - Obtain necessary fall risk management equipment:   - Apply yellow socks and bracelet for high fall risk patients  - Consider moving patient to room near nurses station  Outcome: Progressing  Goal:  Maintain or return to baseline ADL function  Description: INTERVENTIONS:  -  Assess patient's ability to carry out ADLs; assess patient's baseline for ADL function and identify physical deficits which impact ability to perform ADLs (bathing, care of mouth/teeth, toileting, grooming, dressing, etc.)  - Assess/evaluate cause of self-care deficits   - Assess range of motion  - Assess patient's mobility; develop plan if impaired  - Assess patient's need for assistive devices and provide as appropriate  - Encourage maximum independence but intervene and supervise when necessary  - Involve family in performance of ADLs  - Assess for home care needs following discharge   - Consider OT consult to assist with ADL evaluation and planning for discharge  - Provide patient education as appropriate  - Monitor functional capacity and physical performance, use of AM PAC & JH-HLM   - Monitor gait, balance and fatigue with ambulation    Outcome: Progressing  Goal: Maintains/Returns to pre admission functional level  Description: INTERVENTIONS:  - Perform AM-PAC 6 Click Basic Mobility/ Daily Activity assessment daily.  - Set and communicate daily mobility goal to care team and patient/family/caregiver.   - Collaborate with rehabilitation services on mobility goals if consulted  - Perform Range of Motion 3 times a day.  - Reposition patient every 2 hours.  - Dangle patient 3 times a day  - Stand patient 3 times a day  - Ambulate patient 3 times a day  - Out of bed for meals   - Out of bed for toileting  - Record patient progress and toleration of activity level   Outcome: Progressing     Problem: DISCHARGE PLANNING  Goal: Discharge to home or other facility with appropriate resources  Description: INTERVENTIONS:  - Identify barriers to discharge w/patient and caregiver  - Arrange for needed discharge resources and transportation as appropriate  - Identify discharge learning needs (meds, wound care, etc.)  - Arrange for interpretive  services to assist at discharge as needed  - Refer to Case Management Department for coordinating discharge planning if the patient needs post-hospital services based on physician/advanced practitioner order or complex needs related to functional status, cognitive ability, or social support system  Outcome: Progressing     Problem: Knowledge Deficit  Goal: Patient/family/caregiver demonstrates understanding of disease process, treatment plan, medications, and discharge instructions  Description: Complete learning assessment and assess knowledge base.  Interventions:  - Provide teaching at level of understanding  - Provide teaching via preferred learning methods  Outcome: Progressing     Problem: CARDIOVASCULAR - ADULT  Goal: Maintains optimal cardiac output and hemodynamic stability  Description: INTERVENTIONS:  - Monitor I/O, vital signs and rhythm  - Monitor for S/S and trends of decreased cardiac output  - Administer and titrate ordered vasoactive medications to optimize hemodynamic stability  - Assess quality of pulses, skin color and temperature  - Assess for signs of decreased coronary artery perfusion  - Instruct patient to report change in severity of symptoms  Outcome: Progressing  Goal: Absence of cardiac dysrhythmias or at baseline rhythm  Description: INTERVENTIONS:  - Continuous cardiac monitoring, vital signs, obtain 12 lead EKG if ordered  - Administer antiarrhythmic and heart rate control medications as ordered  - Monitor electrolytes and administer replacement therapy as ordered  Outcome: Progressing     Problem: GASTROINTESTINAL - ADULT  Goal: Minimal or absence of nausea and/or vomiting  Description: INTERVENTIONS:  - Administer IV fluids if ordered to ensure adequate hydration  - Maintain NPO status until nausea and vomiting are resolved  - Nasogastric tube if ordered  - Administer ordered antiemetic medications as needed  - Provide nonpharmacologic comfort measures as appropriate  - Advance diet  as tolerated, if ordered  - Consider nutrition services referral to assist patient with adequate nutrition and appropriate food choices  Outcome: Progressing  Goal: Maintains or returns to baseline bowel function  Description: INTERVENTIONS:  - Assess bowel function  - Encourage oral fluids to ensure adequate hydration  - Administer IV fluids if ordered to ensure adequate hydration  - Administer ordered medications as needed  - Encourage mobilization and activity  - Consider nutritional services referral to assist patient with adequate nutrition and appropriate food choices  Outcome: Progressing  Goal: Maintains adequate nutritional intake  Description: INTERVENTIONS:  - Monitor percentage of each meal consumed  - Identify factors contributing to decreased intake, treat as appropriate  - Assist with meals as needed  - Monitor I&O, weight, and lab values if indicated  - Obtain nutrition services referral as needed  Outcome: Progressing  Goal: Oral mucous membranes remain intact  Description: INTERVENTIONS  - Assess oral mucosa and hygiene practices  - Implement preventative oral hygiene regimen  - Implement oral medicated treatments as ordered  - Initiate Nutrition services referral as needed  Outcome: Progressing     Problem: GENITOURINARY - ADULT  Goal: Maintains or returns to baseline urinary function  Description: INTERVENTIONS:  - Assess urinary function  - Encourage oral fluids to ensure adequate hydration if ordered  - Administer IV fluids as ordered to ensure adequate hydration  - Administer ordered medications as needed  - Offer frequent toileting  - Follow urinary retention protocol if ordered  Outcome: Progressing  Goal: Absence of urinary retention  Description: INTERVENTIONS:  - Assess patient’s ability to void and empty bladder  - Monitor I/O  - Bladder scan as needed  - Discuss with physician/AP medications to alleviate retention as needed  - Discuss catheterization for long term situations as  appropriate  Outcome: Progressing     Problem: METABOLIC, FLUID AND ELECTROLYTES - ADULT  Goal: Electrolytes maintained within normal limits  Description: INTERVENTIONS:  - Monitor labs and assess patient for signs and symptoms of electrolyte imbalances  - Administer electrolyte replacement as ordered  - Monitor response to electrolyte replacements, including repeat lab results as appropriate  - Instruct patient on fluid and nutrition as appropriate  Outcome: Progressing  Goal: Fluid balance maintained  Description: INTERVENTIONS:  - Monitor labs   - Monitor I/O and WT  - Instruct patient on fluid and nutrition as appropriate  - Assess for signs & symptoms of volume excess or deficit  Outcome: Progressing  Goal: Glucose maintained within target range  Description: INTERVENTIONS:  - Monitor Blood Glucose as ordered  - Assess for signs and symptoms of hyperglycemia and hypoglycemia  - Administer ordered medications to maintain glucose within target range  - Assess nutritional intake and initiate nutrition service referral as needed  Outcome: Progressing     Problem: SKIN/TISSUE INTEGRITY - ADULT  Goal: Skin Integrity remains intact(Skin Breakdown Prevention)  Description: Assess:  -Perform Sae assessment every shift  -Clean and moisturize skin every shift  -Inspect skin when repositioning, toileting, and assisting with ADLS  -Assess under medical devices   -Assess extremities for adequate circulation and sensation     Bed Management:  -Have minimal linens on bed & keep smooth, unwrinkled  -Change linens as needed when moist or perspiring  -Avoid sitting or lying in one position for more than 2 hours while in bed  -Keep HOB at 30 degrees     Toileting:  -Offer bedside commode  -Assess for incontinence  -Use incontinent care products after each incontinent episode    Activity:  -Mobilize patient 3 times a day  -Encourage activity and walks on unit  -Encourage or provide ROM exercises   -Turn and reposition patient  every 2 Hours  -Use appropriate equipment to lift or move patient in bed  -Instruct/ Assist with weight shifting every hour when out of bed in chair  -Consider limitation of chair time 4 hour intervals    Skin Care:  -Avoid use of baby powder, tape, friction and shearing, hot water or constrictive clothing  -Relieve pressure over bony prominences using foam dressings  -Do not massage red bony areas    Outcome: Progressing  Goal: Incision(s), wounds(s) or drain site(s) healing without S/S of infection  Description: INTERVENTIONS  - Assess and document dressing, incision, wound bed, drain sites and surrounding tissue  - Provide patient and family education  - Perform skin care/dressing changes every shift  Outcome: Progressing  Goal: Pressure injury heals and does not worsen  Description: Interventions:  - Implement low air loss mattress or specialty surface (Criteria met)  - Apply silicone foam dressing  - Instruct/assist with weight shifting every hour when in chair   - Limit chair time to 4 hour intervals  - Use special pressure reducing interventions such as waffle cushion when in chair   - Apply fecal or urinary incontinence containment device   - Perform passive or active ROM every shift  - Turn and reposition patient & offload bony prominences every 2 hours   - Utilize friction reducing device or surface for transfers   Outcome: Progressing     Problem: Potential for Falls  Goal: Patient will remain free of falls  Description: INTERVENTIONS:  - Educate patient/family on patient safety including physical limitations  - Instruct patient to call for assistance with activity   - Consider consulting OT/PT to assist with strengthening/mobility based on AM PAC & -HL score  - Consult OT/PT to assist with strengthening/mobility   - Keep Call bell within reach  - Keep bed low and locked with side rails adjusted as appropriate  - Keep care items and personal belongings within reach  - Initiate and maintain comfort  rounds  - Make Fall Risk Sign visible to staff  - Offer Toileting every 2 Hours, in advance of need  - Initiate/Maintain bed alarm  - Obtain necessary fall risk management equipment:   - Apply yellow socks and bracelet for high fall risk patients  - Consider moving patient to room near nurses station  Outcome: Progressing

## 2025-06-26 NOTE — ASSESSMENT & PLAN NOTE
Lab Results   Component Value Date    EGFR 81 06/26/2025    EGFR 82 06/25/2025    EGFR 67 06/24/2025    CREATININE 0.88 06/26/2025    CREATININE 0.87 06/25/2025    CREATININE 1.04 06/24/2025     POA with creatinine 1.49  Likely prerenal in setting of shock state which is now resolved  Creatinine currently at baseline, euvolemic on exam

## 2025-06-26 NOTE — ASSESSMENT & PLAN NOTE
Leukocytosis, tachycardia, tachycardia, lactic acidosis, and hypotension requiring temporary pressor support. 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 No other clear source appreciated. The patient has clinically improved and no longer requires pressor support. He has transitioned out of ICU to med-surg care. Patient's remained afebrile. His WBC count has trended down. He will need repeat blood cultures sent now.   -antibiotic as below  -check CBCD and BMP tomorrow  -follow up repeat blood cultures  -monitor vitals  -supportive care

## 2025-06-26 NOTE — ASSESSMENT & PLAN NOTE
Patient follows chronically without podiatry/wound management. 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 Now with MRI of the R foot showing soft tissue ulcer overlying the area of 4-5th ray surgery without underlying abscess, and marrow edema of the residual 4th and 5th metatarsals as well as the 3rd metatarsal head and 3rd proximal phalanx. Podiatry performed R TMA on 6/23/2025. Operative cultures with growth of MSSA, pseudomonas, and serratia. Surgical cure was not felt to be achieved. VAC therapy now ongoing at surgical site.  -antibiotic as above  -serial R foot exams  -local care/VAC care per podiatry  -continue follow up with podiatry

## 2025-06-26 NOTE — ASSESSMENT & PLAN NOTE
Resumed prehospital atenolol 6/25,  shock state resolved  Had episodes of atrial fibrillation with RVR 6/21 treated with 3 doses of metoprolol IV followed by Cardizem.  No further episodes  Continue prehospital Eliquis

## 2025-06-27 LAB
ANION GAP SERPL CALCULATED.3IONS-SCNC: 5 MMOL/L (ref 4–13)
BACTERIA WND AEROBE CULT: ABNORMAL
BASOPHILS # BLD AUTO: 0.03 THOUSANDS/ÂΜL (ref 0–0.1)
BASOPHILS NFR BLD AUTO: 0 % (ref 0–1)
BUN SERPL-MCNC: 18 MG/DL (ref 5–25)
CALCIUM SERPL-MCNC: 9 MG/DL (ref 8.4–10.2)
CHLORIDE SERPL-SCNC: 103 MMOL/L (ref 96–108)
CO2 SERPL-SCNC: 26 MMOL/L (ref 21–32)
CREAT SERPL-MCNC: 0.82 MG/DL (ref 0.6–1.3)
EOSINOPHIL # BLD AUTO: 0.14 THOUSAND/ÂΜL (ref 0–0.61)
EOSINOPHIL NFR BLD AUTO: 2 % (ref 0–6)
ERYTHROCYTE [DISTWIDTH] IN BLOOD BY AUTOMATED COUNT: 15.2 % (ref 11.6–15.1)
GFR SERPL CREATININE-BSD FRML MDRD: 84 ML/MIN/1.73SQ M
GLUCOSE SERPL-MCNC: 162 MG/DL (ref 65–140)
GLUCOSE SERPL-MCNC: 176 MG/DL (ref 65–140)
GLUCOSE SERPL-MCNC: 235 MG/DL (ref 65–140)
GLUCOSE SERPL-MCNC: 245 MG/DL (ref 65–140)
GLUCOSE SERPL-MCNC: 315 MG/DL (ref 65–140)
GRAM STN SPEC: ABNORMAL
HCT VFR BLD AUTO: 31.7 % (ref 36.5–49.3)
HGB BLD-MCNC: 9.9 G/DL (ref 12–17)
IMM GRANULOCYTES # BLD AUTO: 0.05 THOUSAND/UL (ref 0–0.2)
IMM GRANULOCYTES NFR BLD AUTO: 1 % (ref 0–2)
LYMPHOCYTES # BLD AUTO: 1.14 THOUSANDS/ÂΜL (ref 0.6–4.47)
LYMPHOCYTES NFR BLD AUTO: 16 % (ref 14–44)
MCH RBC QN AUTO: 27.8 PG (ref 26.8–34.3)
MCHC RBC AUTO-ENTMCNC: 31.2 G/DL (ref 31.4–37.4)
MCV RBC AUTO: 89 FL (ref 82–98)
MONOCYTES # BLD AUTO: 0.6 THOUSAND/ÂΜL (ref 0.17–1.22)
MONOCYTES NFR BLD AUTO: 8 % (ref 4–12)
NEUTROPHILS # BLD AUTO: 5.28 THOUSANDS/ÂΜL (ref 1.85–7.62)
NEUTS SEG NFR BLD AUTO: 73 % (ref 43–75)
NRBC BLD AUTO-RTO: 0 /100 WBCS
PLATELET # BLD AUTO: 316 THOUSANDS/UL (ref 149–390)
PMV BLD AUTO: 9.3 FL (ref 8.9–12.7)
POTASSIUM SERPL-SCNC: 4.4 MMOL/L (ref 3.5–5.3)
RBC # BLD AUTO: 3.56 MILLION/UL (ref 3.88–5.62)
SODIUM SERPL-SCNC: 134 MMOL/L (ref 135–147)
WBC # BLD AUTO: 7.24 THOUSAND/UL (ref 4.31–10.16)

## 2025-06-27 PROCEDURE — 99232 SBSQ HOSP IP/OBS MODERATE 35: CPT

## 2025-06-27 PROCEDURE — 80048 BASIC METABOLIC PNL TOTAL CA: CPT

## 2025-06-27 PROCEDURE — 97530 THERAPEUTIC ACTIVITIES: CPT

## 2025-06-27 PROCEDURE — 99233 SBSQ HOSP IP/OBS HIGH 50: CPT | Performed by: INTERNAL MEDICINE

## 2025-06-27 PROCEDURE — 97110 THERAPEUTIC EXERCISES: CPT

## 2025-06-27 PROCEDURE — 85025 COMPLETE CBC W/AUTO DIFF WBC: CPT

## 2025-06-27 PROCEDURE — 82948 REAGENT STRIP/BLOOD GLUCOSE: CPT

## 2025-06-27 RX ORDER — INSULIN LISPRO 100 [IU]/ML
2 INJECTION, SOLUTION INTRAVENOUS; SUBCUTANEOUS
Status: DISCONTINUED | OUTPATIENT
Start: 2025-06-27 | End: 2025-06-30

## 2025-06-27 RX ADMIN — INSULIN LISPRO 2 UNITS: 100 INJECTION, SOLUTION INTRAVENOUS; SUBCUTANEOUS at 17:15

## 2025-06-27 RX ADMIN — MICONAZOLE NITRATE: 20 CREAM TOPICAL at 17:15

## 2025-06-27 RX ADMIN — ATORVASTATIN CALCIUM 40 MG: 40 TABLET, FILM COATED ORAL at 17:15

## 2025-06-27 RX ADMIN — SENNOSIDES AND DOCUSATE SODIUM 2 TABLET: 50; 8.6 TABLET ORAL at 21:20

## 2025-06-27 RX ADMIN — INSULIN LISPRO 1 UNITS: 100 INJECTION, SOLUTION INTRAVENOUS; SUBCUTANEOUS at 08:30

## 2025-06-27 RX ADMIN — APIXABAN 5 MG: 5 TABLET, FILM COATED ORAL at 17:15

## 2025-06-27 RX ADMIN — INSULIN LISPRO 2 UNITS: 100 INJECTION, SOLUTION INTRAVENOUS; SUBCUTANEOUS at 21:20

## 2025-06-27 RX ADMIN — CEFEPIME 2000 MG: 2 INJECTION, POWDER, FOR SOLUTION INTRAVENOUS at 12:39

## 2025-06-27 RX ADMIN — INSULIN LISPRO 3 UNITS: 100 INJECTION, SOLUTION INTRAVENOUS; SUBCUTANEOUS at 17:14

## 2025-06-27 RX ADMIN — Medication 1 TABLET: at 08:31

## 2025-06-27 RX ADMIN — FAMOTIDINE 20 MG: 20 TABLET, FILM COATED ORAL at 08:31

## 2025-06-27 RX ADMIN — BUDESONIDE, GLYCOPYRROLATE, AND FORMOTEROL FUMARATE 2 PUFF: 160; 9; 4.8 AEROSOL, METERED RESPIRATORY (INHALATION) at 21:19

## 2025-06-27 RX ADMIN — APIXABAN 5 MG: 5 TABLET, FILM COATED ORAL at 08:31

## 2025-06-27 RX ADMIN — ATENOLOL 25 MG: 25 TABLET ORAL at 08:31

## 2025-06-27 RX ADMIN — CEFEPIME 2000 MG: 2 INJECTION, POWDER, FOR SOLUTION INTRAVENOUS at 23:30

## 2025-06-27 RX ADMIN — INSULIN GLARGINE 10 UNITS: 100 INJECTION, SOLUTION SUBCUTANEOUS at 21:20

## 2025-06-27 RX ADMIN — MICONAZOLE NITRATE: 20 CREAM TOPICAL at 08:32

## 2025-06-27 RX ADMIN — LISINOPRIL 5 MG: 5 TABLET ORAL at 08:31

## 2025-06-27 RX ADMIN — INSULIN LISPRO 2 UNITS: 100 INJECTION, SOLUTION INTRAVENOUS; SUBCUTANEOUS at 12:39

## 2025-06-27 RX ADMIN — PREDNISONE 5 MG: 5 TABLET ORAL at 08:31

## 2025-06-27 RX ADMIN — MONTELUKAST 10 MG: 10 TABLET, FILM COATED ORAL at 21:20

## 2025-06-27 RX ADMIN — CLOPIDOGREL 75 MG: 75 TABLET ORAL at 08:31

## 2025-06-27 RX ADMIN — BUDESONIDE, GLYCOPYRROLATE, AND FORMOTEROL FUMARATE 2 PUFF: 160; 9; 4.8 AEROSOL, METERED RESPIRATORY (INHALATION) at 08:31

## 2025-06-27 NOTE — PHYSICAL THERAPY NOTE
PHYSICAL THERAPY TREATMENT NOTE  NAME:  Gold Van  DATE: 06/27/25    Length Of Stay: 10  Performed at least 2 patient identifiers during session: Name and ID bracelet    TREATMENT:      06/27/25 1117   PT Last Visit   PT Visit Date 06/27/25   Note Type   Note Type Treatment   Pain Assessment   Pain Assessment Tool 0-10   Pain Score No Pain   Restrictions/Precautions   Weight Bearing Precautions Per Order Yes   RLE Weight Bearing Per Order NWB   Other Precautions Chair Alarm;Bed Alarm;WBS;Fall Risk;Pain;Multiple lines   General   Family/Caregiver Present No   Cognition   Overall Cognitive Status WFL   Arousal/Participation Alert;Responsive;Cooperative   Attention Within functional limits   Orientation Level Oriented X4   Memory Decreased recall of precautions   Following Commands Follows one step commands without difficulty   Comments pt agreeable to PT treatment   Balance   Static Sitting Good   Dynamic Sitting Fair +   Endurance Deficit   Endurance Deficit Yes   Endurance Deficit Description fatigues with activity   Activity Tolerance   Activity Tolerance Patient limited by fatigue;Patient limited by pain;Other (Comment)  (NWB status)   Nurse Made Aware nursing staff made aware of outcomes   Exercises   Quad Sets Supine;AROM;Bilateral  (30 reps)   Heelslides Sitting;AROM;Bilateral  (30 reps)   Hip Flexion Supine;AROM;Bilateral  (30 reps)   Hip Abduction Supine;AROM;Bilateral  (30 reps)   Hip Adduction Supine;AROM;Bilateral  (30 reps)   Knee AROM Short Arc Quad Supine;AROM;Bilateral  (30 reps)   Ankle Pumps Supine;AROM;Bilateral  (30 reps)   Bridging   (bridging and scooting semireclined in recliner for repositioning)   Assessment   Prognosis Fair   Problem List Decreased strength;Decreased endurance;Impaired balance;Decreased mobility;Decreased skin integrity;Pain   Assessment Pt seen for PT treatment session this date with interventions consisting of Therapeutic exercise consisting of: AROM 2 sets of 15  reps B LE in semireclined in recliner position and therapeutic activity consisting of training: bed mobility. Pt agreeable to PT treatment session upon arrival, pt found seated OOB in recliner, in no apparent distress and responsive. In comparison to previous session, pt with improvements in strength and endurance . Post session: pt returned back to recliner, chair alarm engaged, all needs in reach, and RN notified of session findings/recommendations. Continue to recommend Level I (Maximum Resource Intensity) at time of d/c in order to maximize pt's functional independence and safety w/ mobility. Pt continues to be functioning below baseline level. PT will continue to see pt during current hospitalization in order to address the deficits listed above and provide interventions consistent w/ POC in effort to achieve STGs.    Nai Lynn PTA   Barriers to Discharge Inaccessible home environment   Goals   Patient Goals to be able to go home   LTG Expiration Date 07/04/25   Plan   Treatment/Interventions Functional transfer training;Therapeutic exercise;Endurance training;Bed mobility;Gait training;Spoke to nursing   PT Frequency 3-5x/wk   Discharge Recommendation   Rehab Resource Intensity Level, PT I (Maximum Resource Intensity)   AM-PAC Basic Mobility Inpatient   Turning in Flat Bed Without Bedrails 2   Lying on Back to Sitting on Edge of Flat Bed Without Bedrails 2   Moving Bed to Chair 1   Standing Up From Chair Using Arms 1   Walk in Room 1   Climb 3-5 Stairs With Railing 1   Basic Mobility Inpatient Raw Score 8   Turning Head Towards Sound 4   Follow Simple Instructions 4   Low Function Basic Mobility Raw Score  16   Low Function Basic Mobility Standardized Score  25.72   [facility] Highest Level Of Mobility   -HL Goal 3: Sit at edge of bed   -HLM Achieved 4: Move to chair/commode   End of Consult   Patient Position at End of Consult Bedside chair;Bed/Chair alarm activated;All needs within reach          The patient's AM-PAC Basic Mobility Inpatient Short Form Raw Score is 8. A Raw score of less than or equal to 16 suggests the patient may benefit from discharge to post-acute rehabilitation services. Please also refer to the recommendation of the Physical Therapist for safe discharge planning.      Nai Lynn PTA,PTA

## 2025-06-27 NOTE — PROGRESS NOTES
Progress Note - Infectious Disease   Name: Gold Van 79 y.o. male I MRN: [id number]  Unit/Bed#: -01 I Date of Admission: 6/17/2025   Date of Service: 6/27/2025 I Hospital Day: 10     Administrative Statements   VIRTUAL CARE DOCUMENTATION:     1. This service was provided via Telemedicine using [employer] Kit     2. Parties in the room with patient during teleconsult Patient only    3. Confidentiality My office door was closed     4. Participants No one else was in the room    5. Patient acknowledged consent and understanding of privacy and security of the  Telemedicine consult. I informed the patient that I have reviewed their record in Epic and presented the opportunity for them to ask any questions regarding the visit today.  The patient agreed to participate.    6. I have spent a total time of 30 minutes in caring for this patient on the day of the visit/encounter including Diagnostic results, Impressions, Documenting in the medical record, Reviewing/placing orders in the medical record (including tests, medications, and/or procedures), Obtaining or reviewing history  , and Communicating with other healthcare professionals , not including the time spent for establishing the audio/video connection.     Assessment & Plan  Septic shock (HCC)  Leukocytosis, tachycardia, tachycardia, lactic acidosis, and hypotension requiring temporary pressor support. 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 No other clear source appreciated. The patient has clinically improved and no longer requires pressor support. He has transitioned out of ICU to med-surg care. Patient's remained afebrile. His WBC count has trended down. Repeat blood cultures are pending.   -antibiotic as below  -check CBCD and BMP tomorrow  -follow up repeat blood cultures  -monitor vitals  -supportive care  MSSA bacteremia  Likely secondary to R foot osteomyelitis. Patient's admission blood cultures with growth of MSSA in one. MSSA also  present in R foot wound culture. He has no implanted devices or surgical hardware. TTE was a technically difficulty study but showed no valvular vegetations. Will not pursue DENISE at this time given clear source and given that patient will require prolonged antibiotic for foot treatment anyway. Patient has been transition to IV Cefepime for coverage of both his bacteremia and foot infection. He is tolerating antibiotic without difficulty. Repeat blood cultures are pending.    -continue IV cefepime, 2g q12, dosed for renal function  -check CBCD and BMP tomorrow  -follow up repeat blood cultures  -monitor vitals  -anticipate prolonged course of IV antibiotic treatment after cleared blood cultures and in setting of R foot osteomyelitis below  -anticipate eventual PICC placement when repeat blood cultures are negative >72 hours  -anticipate outpatient ID follow up after discharge   Osteomyelitis of right foot (HCC)  Patient follows chronically without podiatry/wound management. 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 Now with MRI of the R foot showing soft tissue ulcer overlying the area of 4-5th ray surgery without underlying abscess, and marrow edema of the residual 4th and 5th metatarsals as well as the 3rd metatarsal head and 3rd proximal phalanx. Podiatry performed R TMA on 6/23/2025. Operative cultures with growth of MSSA, pseudomonas, and serratia. Surgical cure was not felt to be achieved. VAC therapy now ongoing at surgical site.  -antibiotic as above  -serial R foot exams  -local care/VAC care per podiatry  -continue follow up with podiatry   Type 2 diabetes mellitus with complication, without long-term current use of insulin ([facility])  Lab Results   Component Value Date    HGBA1C 6.6 (H) 06/18/2025   Elevated blood glucose is risk factor for wounds and infection.   -recommend tight glycemic control  -blood glucose management per primary service  Acute renal failure superimposed on stage 2 chronic  kidney disease  (HCC)  Lab Results   Component Value Date    EGFR 84 2025    EGFR 81 2025    EGFR 82 2025    CREATININE 0.82 2025    CREATININE 0.88 2025    CREATININE 0.87 2025   This impacts antibiotic dosing. Upon review of patient's medical records it appears his baseline creatinine is approximately 0.7-0.9. Creatinine was 1.49 upon admission. Creatinine is now improved, was 0.82 on most recent BMP.  -monitor creatinine  -dose adjust antibiotic for renal function as needed  -avoid nephrotoxins  -volume management per primary service   Allergy to antibiotic  Patient reports SOB with ciprofloxacin and Bactrim. We will avoid these antibiotics for now.  -monitor patient for adverse medication reactions     Infectious disease will continue to follow along with the patient. He will be formally reassessed on Monday, 2025. Please call sooner with new questions or concerns.    Above plan was discussed in detail with patient over the TV kit.  Above plan was discussed in detail with SRI who agrees with plan for ongoing antibiotic treatment.     Subjective:  Patient reports he's doing pretty good this morning. Did have some bleeding on his dressing last night but reports when nurse took it off the site looked good and he had no issues with his sutures. Patient's reports current dressing has been staying dry and intact. He has no fever, chills, sweats, shakes; no nausea, vomiting, abdominal pain, diarrhea, or dysuria; no cough, shortness of breath, or chest pain. No new symptoms.    Antibiotics:  Cefepime 2  Antibiotics 11    Physical Exam   Temp:  [97.8 °F (36.6 °C)-98.3 °F (36.8 °C)] 97.8 °F (36.6 °C)  HR:  [72-90] 73  Resp:  [16-20] 16  BP: (114-130)/(60-72) 130/65  SpO2:  [95 %-97 %] 97 %  Temp (24hrs), Av °F (36.7 °C), Min:97.8 °F (36.6 °C), Max:98.3 °F (36.8 °C)  Current: Temperature: 97.8 °F (36.6 °C)    Intake/Output Summary (Last 24 hours) at 2025 0703  Last data  filed at 6/27/2025 0339  Gross per 24 hour   Intake 780 ml   Output 1175 ml   Net -395 ml     Physical exam findings reported by bedside and primary medical team staff, also observed on TV kit:  General Appearance:  Alert, interactive, nontoxic, no acute distress. He appears comfortable sitting up in bed, feet propped on foot board.   Throat: Oropharynx moist without lesions.    Lungs:   Clear to auscultation bilaterally; no wheezes, rhonchi or rales; respirations unlabored on room air.   Heart:  RRR; no murmur, rub or gallop.   Abdomen:   Soft, non-tender, non-distended.   Extremities: R foot dressing clean, dry, and intact.   Skin: No new rashes noted on exposed skin.     Invasive Devices:   Peripheral IV 06/26/25 Left;Ventral (anterior) Forearm (Active)   Site Assessment WDL;Dry;Intact 06/26/25 2200   Dressing Type Transparent 06/26/25 2200   Line Status Flushed & Clamped 06/26/25 2200   Dressing Status Clean 06/26/25 2200   Dressing Change Due 06/30/25 06/26/25 2200   Reason Not Rotated Not due 06/26/25 2200     Labs, Imaging, & Other Studies   I have personally reviewed pertinent labs.    Results from last 7 days   Lab Units 06/27/25  0526 06/26/25  0756 06/25/25  0605   WBC Thousand/uL 7.24 8.39 7.23   HEMOGLOBIN g/dL 9.9* 9.8* 9.6*   PLATELETS Thousands/uL 316 300 273     Results from last 7 days   Lab Units 06/27/25  0526   POTASSIUM mmol/L 4.4   CHLORIDE mmol/L 103   CO2 mmol/L 26   BUN mg/dL 18   CREATININE mg/dL 0.82   EGFR ml/min/1.73sq m 84   CALCIUM mg/dL 9.0     Results from last 7 days   Lab Units 06/26/25  1123 06/23/25  1454   BLOOD CULTURE  Received in Microbiology Lab. Culture in Progress.  Received in Microbiology Lab. Culture in Progress.  --    GRAM STAIN RESULT   --  No Polys or Bacteria seen  No Polys or Bacteria seen   WOUND CULTURE   --  3 colonies Serratia marcescens*  3 colonies Pseudomonas aeruginosa*  2 colonies Staphylococcus aureus*  2 colonies  2+ Growth of Staphylococcus  aureus*

## 2025-06-27 NOTE — PROGRESS NOTES
Progress Note - Hospitalist   Name: Gold Van 79 y.o. male I MRN: [id number]  Unit/Bed#: -01 I Date of Admission: 6/17/2025   Date of Service: 6/27/2025 I Hospital Day: 10    Assessment & Plan  Septic shock (HCC)  POA, shock criteria have since resolved  Source: RLE osteo  1 of 2 blood cultures with MSSA  Hx serratia/pseudomonas/e faecalis wound infection in past  MRI R foot-Postsurgical changes of partial fourth and fifth ray resections with a soft tissue ulcer overlying the cut surfaces of the fourth and fifth metatarsals and findings concerning for early osteomyelitis in the residual fourth metatarsal. No definite MRI evidence of osteomyelitis. The ulcer is also in close approximation to the distal third metatarsal, and early osteomyelitis in that location cannot be excluded. No evidence of an abscess  S/p R TMA 6/23/2025  Wound culture with MSSA, Pseudomonas and Serratia  ID following, discontinued vancomycin and Zosyn 6/26 and started cefepime.    TTE without obvious signs of vegetation  6/26 follow-up blood cultures x 2 results pending.  ID feels patient will need long-term antibiotics and will need a line placed after clearance of these cultures  Case management continues to follow.  Rehab referrals have been made.   Acute renal failure superimposed on stage 2 chronic kidney disease  ([facility])  Lab Results   Component Value Date    EGFR 84 06/27/2025    EGFR 81 06/26/2025    EGFR 82 06/25/2025    CREATININE 0.82 06/27/2025    CREATININE 0.88 06/26/2025    CREATININE 0.87 06/25/2025     POA with creatinine 1.49  Likely prerenal in setting of shock state which is now resolved  Creatinine currently at baseline, euvolemic on exam  Type 2 diabetes mellitus with complication, without long-term current use of insulin ([facility])  Lab Results   Component Value Date    HGBA1C 6.6 (H) 06/18/2025       Recent Labs     06/26/25  1559 06/26/25 2018 06/27/25  0751 06/27/25  1101   POCGLU 286* 390* 162* 245*       Blood  Sugar Average: Last 72 hrs:  (P) 230.6476209264448390  Hold prehospital metformin and glipizide while inpatient resume on discharge  Blood sugar results reviewed  Continue sliding scale insulin coverage ACHS  Continue 5 units of Lantus at bedtime  Add 2 units of Humalog coverage with meals  CHO diet  Hypoglycemia protocol  Continue to monitor blood sugars and adjust regimen accordingly  BMP a.m.  Essential hypertension  Shock state resolved  lisinopril and atenolol resumed 6/25 at prehospital doses  Monitor BP per protocol  CVA (cerebrovascular accident) ([facility])  No residual deficits  Continue home statin and Plavix  PAF (paroxysmal atrial fibrillation) ([facility])  Resumed prehospital atenolol 6/25,  shock state resolved  Had episodes of atrial fibrillation with RVR 6/21 treated with 3 doses of metoprolol IV followed by Cardizem.  No further episodes  Continue prehospital Eliquis  Mixed hyperlipidemia  Continue home statin  Hyponatremia  Chronically 130-134  Currently at baseline  Euvolemic on exam  Continue to trend, BMP a.m.  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  Pressure injury of skin of sacral region  Continue wound care per protocol  Metabolic acidosis  Resolved  Ambulatory dysfunction  RLE NWB  PT OT-recommend rehab placement  Case management is making rehab referrals-he is not currently medically stable for discharge is being treated for MSSA bacteremia  Moderate protein-calorie malnutrition (HCC)  Malnutrition Findings:        Nutrition following  Monitor appetites                       BMI Findings:           Body mass  index is 18.58 kg/m².     MSSA bacteremia  Appreciate ID who are following  1/2 blood culture on admission with MSSA bacteremia  Patient was noted to have MSSA and soft tissue culture with MSSA, Pseudomonas and Serratia  TTE without signs of obvious vegetation  Appreciate ID who are following   6/26 IV Zosyn and vancomycin were discontinued by ID.  Patient was initiated on cefepime 2 g IV every 12 hours dosed for renal function-will continue  6/26 blood cultures x 2 results pending  ID anticipates prolonged IV antibiotic treatment after blood cultures cleared in setting of right foot osteomyelitis.  Anticipate eventual PICC placement.      Osteomyelitis of right foot (HCC)  Noted on MRI imaging  Patient follows with podiatry outpatient for chronic wounds, podiatry continues to follow inpatient  S/p right TMA on 6/23/25  Operative cultures with MSSA, Pseudomonas and Serratia  Appreciate ID who are following-vancomycin and Zosyn discontinued 6/26, initiated cefepime   6/26 follow-up blood cultures x 2 results pending  See plan for MSSA bacteremia  Allergy to antibiotic  Appreciate ID who are following for MSSA bacteremia  Avoid ciprofloxacin and Bactrim as patient reports shortness of breath with these antibiotics    VTE Pharmacologic Prophylaxis: VTE Score: 3 Moderate Risk (Score 3-4) - Pharmacological DVT Prophylaxis Ordered: apixaban (Eliquis).    Mobility:   Basic Mobility Inpatient Raw Score: 8  JH-HLM Goal: 3: Sit at edge of bed  JH-HLM Achieved: 4: Move to chair/commode  JH-HLM Goal achieved. Continue to encourage appropriate mobility.    Patient Centered Rounds: I performed bedside rounds with nursing staff today.   Discussions with Specialists or Other Care Team Provider: ID, podiatry, nursing, case management    Education and Discussions with Family / Patient: Updated  (wife) at bedside.    Current Length of Stay: 10 day(s)  Current Patient Status: Inpatient   Certification Statement: The  patient will continue to require additional inpatient hospital stay due to MSSA bacteremia treating with IV antibiotics, ID following, repeat cultures results pending.  ID suspects will need long-term antibiotics and PICC, pending TTE, repeat labs a.m.  Discharge Plan: Patient being treated for MSSA bacteremia ID is following.  Follow-up blood cultures from yesterday are still pending to determine if bacteria has cleared.  ID continues to follow and antibiotics are per ID orders.  Plan will be long-term antibiotics and patient will need PICC after clearance of blood cultures.  Case management continues to work on discharge planning rehab referrals have been made.  He is not medically stable for discharge at this time.    Code Status: Level 1 - Full Code    Subjective   Denies any dizziness, lightness, chest pain or palpitations.  Denies pain or discomfort.. Out of bed in chair verbalizing needs.   Objective :  Temp:  [97.8 °F (36.6 °C)-98.2 °F (36.8 °C)] 98.1 °F (36.7 °C)  HR:  [72-92] 73  BP: (115-131)/(60-76) 115/62  Resp:  [16-20] 20  SpO2:  [93 %-98 %] 98 %  O2 Device: None (Room air)    Body mass index is 18.58 kg/m².     Input and Output Summary (last 24 hours):     Intake/Output Summary (Last 24 hours) at 6/27/2025 1414  Last data filed at 6/27/2025 0749  Gross per 24 hour   Intake 300 ml   Output 1320 ml   Net -1020 ml       Physical Exam  Vitals reviewed.   Constitutional:       General: He is not in acute distress.     Appearance: He is well-developed.   HENT:      Head: Normocephalic and atraumatic.     Cardiovascular:      Rate and Rhythm: Normal rate and regular rhythm.      Heart sounds: No murmur heard.  Pulmonary:      Effort: Pulmonary effort is normal. No respiratory distress.      Breath sounds: Normal breath sounds. No wheezing or rales.   Abdominal:      General: There is no distension.      Palpations: Abdomen is soft.      Tenderness: There is no abdominal tenderness. There is no guarding.      Musculoskeletal:         General: No swelling.     Skin:     General: Skin is warm and dry.      Capillary Refill: Capillary refill takes less than 2 seconds.      Comments: Right foot dressing intact     Neurological:      General: No focal deficit present.      Mental Status: He is alert and oriented to person, place, and time. Mental status is at baseline.     Psychiatric:         Mood and Affect: Mood normal.         Behavior: Behavior normal.         Thought Content: Thought content normal.         Judgment: Judgment normal.           Lines/Drains:              Lab Results: I have reviewed the following results:   Results from last 7 days   Lab Units 06/27/25  0526   WBC Thousand/uL 7.24   HEMOGLOBIN g/dL 9.9*   HEMATOCRIT % 31.7*   PLATELETS Thousands/uL 316   SEGS PCT % 73   LYMPHO PCT % 16   MONO PCT % 8   EOS PCT % 2     Results from last 7 days   Lab Units 06/27/25  0526   SODIUM mmol/L 134*   POTASSIUM mmol/L 4.4   CHLORIDE mmol/L 103   CO2 mmol/L 26   BUN mg/dL 18   CREATININE mg/dL 0.82   ANION GAP mmol/L 5   CALCIUM mg/dL 9.0   GLUCOSE RANDOM mg/dL 176*         Results from last 7 days   Lab Units 06/27/25  1101 06/27/25  0751 06/26/25  2018 06/26/25  1559 06/26/25  1059 06/26/25  0802 06/25/25  2034 06/25/25  1546 06/25/25  1054 06/25/25  0718 06/24/25  2131 06/24/25  1604   POC GLUCOSE mg/dl 245* 162* 390* 286* 274* 186* 290* 325* 252* 169* 240* 347*                 Recent Cultures (last 7 days):   Results from last 7 days   Lab Units 06/26/25  1123 06/23/25  1454   BLOOD CULTURE  Received in Microbiology Lab. Culture in Progress.  Received in Microbiology Lab. Culture in Progress.  --    GRAM STAIN RESULT   --  No Polys or Bacteria seen  No Polys or Bacteria seen   WOUND CULTURE   --  3 colonies Serratia marcescens*  3 colonies Pseudomonas aeruginosa*  2 colonies Staphylococcus aureus*  2 colonies  2+ Growth of Staphylococcus aureus*       Imaging Results Review: I reviewed radiology  reports from this admission including: MRI right foot, right foot x-ray.  Other Study Results Review: No additional pertinent studies reviewed.    Last 24 Hours Medication List:     Current Facility-Administered Medications:     acetaminophen (TYLENOL) tablet 650 mg, Q6H PRN    albuterol (PROVENTIL HFA,VENTOLIN HFA) inhaler 2 puff, Q4H PRN    albuterol inhalation solution 2.5 mg, Q6H PRN    apixaban (ELIQUIS) tablet 5 mg, BID    atenolol (TENORMIN) tablet 25 mg, Daily    atorvastatin (LIPITOR) tablet 40 mg, QPM    Budeson-Glycopyrrol-Formoterol 160-9-4.8 MCG/ACT AERO 2 puff, Q12H SANDRA    ceFEPime (MAXIPIME) 2,000 mg in dextrose 5 % 50 mL IVPB, Q12H, Last Rate: 2,000 mg (06/27/25 1239)    clopidogrel (PLAVIX) tablet 75 mg, Daily    diltiazem (CARDIZEM) injection 15 mg, Once    diphenhydrAMINE (BENADRYL) injection 25 mg, Q6H PRN    famotidine (PEPCID) tablet 20 mg, Daily    heparin (porcine) injection 1,800 Units, Q6H PRN    heparin (porcine) injection 3,600 Units, Q6H PRN    HYDROmorphone HCl (DILAUDID) injection 0.2 mg, Q2H PRN    insulin glargine (LANTUS) subcutaneous injection 10 Units 0.1 mL, HS    insulin lispro (HumALOG/ADMELOG) 100 units/mL subcutaneous injection 1-5 Units, TID AC **AND** Fingerstick Glucose (POCT), TID AC    insulin lispro (HumALOG/ADMELOG) 100 units/mL subcutaneous injection 1-5 Units, HS    insulin lispro (HumALOG/ADMELOG) 100 units/mL subcutaneous injection 2 Units, TID With Meals    lisinopril (ZESTRIL) tablet 5 mg, Daily    moisture barrier miconazole 2% cream (aka CALEB MOISTURE BARRIER ANTIFUNGAL CREAM), BID    montelukast (SINGULAIR) tablet 10 mg, HS    multivitamin-minerals (CENTRUM) tablet 1 tablet, Daily    naloxone (NARCAN) 0.04 mg/mL syringe 0.04 mg, Q1MIN PRN    ondansetron (ZOFRAN) injection 4 mg, Q6H PRN    oxyCODONE (ROXICODONE) split tablet 2.5 mg, Q4H PRN **OR** oxyCODONE (ROXICODONE) IR tablet 5 mg, Q4H PRN    polyethylene glycol (MIRALAX) packet 17 g, Daily PRN     predniSONE tablet 5 mg, Daily    senna-docusate sodium (SENOKOT S) 8.6-50 mg per tablet 2 tablet, HS    Administrative Statements   Today, Patient Was Seen By: BELGICA Nelson  I have spent a total time of 36 minutes in caring for this patient on the day of the visit/encounter including Patient and family education, Counseling / Coordination of care, Documenting in the medical record, Reviewing/placing orders in the medical record (including tests, medications, and/or procedures), Obtaining or reviewing history  , and Communicating with other healthcare professionals .    **Please Note: This note may have been constructed using a voice recognition system.**

## 2025-06-27 NOTE — ASSESSMENT & PLAN NOTE
Appreciate ID who are following for MSSA bacteremia  Avoid ciprofloxacin and Bactrim as patient reports shortness of breath with these antibiotics

## 2025-06-27 NOTE — PLAN OF CARE
Problem: Prexisting or High Potential for Compromised Skin Integrity  Goal: Skin integrity is maintained or improved  Description: INTERVENTIONS:  - Identify patients at risk for skin breakdown  - Assess and monitor skin integrity including under and around medical devices   - Assess and monitor nutrition and hydration status  - Monitor labs  - Assess for incontinence   - Turn and reposition patient  - Assist with mobility/ambulation  - Relieve pressure over joycelyn prominences   - Avoid friction and shearing  - Provide appropriate hygiene as needed including keeping skin clean and dry  - Evaluate need for skin moisturizer/barrier cream  - Collaborate with interdisciplinary team  - Patient/family teaching  - Consider wound care consult    Assess:  - Review Sae scale daily  - Clean and moisturize skin every shift  - Inspect skin when repositioning, toileting, and assisting with ADLS  - Assess under medical devices   - Assess extremities for adequate circulation and sensation     Bed Management:  - Have minimal linens on bed & keep smooth, unwrinkled  - Change linens as needed when moist or perspiring  - Avoid sitting or lying in one position for more than 2 hours while in bed?Keep HOB at 30 degrees   - Toileting:  - Offer bedside commode  - Assess for incontinence   - Use incontinent care products after each incontinent episode     Activity:  - Mobilize patient 3 times a day  - Encourage activity and walks on unit  - Encourage or provide ROM exercises   - Turn and reposition patient every 2 Hours  - Use appropriate equipment to lift or move patient in bed  - Instruct/ Assist with weight shifting every 60 when out of bed in chair  - Consider limitation of chair time 2 hour intervals    Skin Care:  - Avoid use of baby powder, tape, friction and shearing, hot water or constrictive clothing  - Relieve pressure over bony prominences using mepelex  - Do not massage red bony areas    Next Steps:  - Teach patient  strategies to minimize risks  - Consider consults to  interdisciplinary teams   Outcome: Progressing     Problem: PAIN - ADULT  Goal: Verbalizes/displays adequate comfort level or baseline comfort level  Description: Interventions:  - Encourage patient to monitor pain and request assistance  - Assess pain using appropriate pain scale  - Administer analgesics as ordered based on type and severity of pain and evaluate response  - Implement non-pharmacological measures as appropriate and evaluate response  - Consider cultural and social influences on pain and pain management  - Notify physician/advanced practitioner if interventions unsuccessful or patient reports new pain  - Educate patient/family on pain management process including their role and importance of  reporting pain   - Provide non-pharmacologic/complimentary pain relief interventions  Outcome: Progressing     Problem: INFECTION - ADULT  Goal: Absence or prevention of progression during hospitalization  Description: INTERVENTIONS:  - Assess and monitor for signs and symptoms of infection  - Monitor lab/diagnostic results  - Monitor all insertion sites, i.e. indwelling lines, tubes, and drains  - Monitor endotracheal if appropriate and nasal secretions for changes in amount and color  - [place] appropriate cooling/warming therapies per order  - Administer medications as ordered  - Instruct and encourage patient and family to use good hand hygiene technique  - Identify and instruct in appropriate isolation precautions for identified infection/condition  Outcome: Progressing  Goal: Absence of fever/infection during neutropenic period  Description: INTERVENTIONS:  - Monitor WBC  - Perform strict hand hygiene  - Limit to healthy visitors only  - No plants, dried, fresh or silk flowers with otoole in patient room  Outcome: Progressing     Problem: SAFETY ADULT  Goal: Patient will remain free of falls  Description: INTERVENTIONS:  - Educate patient/family on  patient safety including physical limitations  - Instruct patient to call for assistance with activity   - Consider consulting OT/PT to assist with strengthening/mobility based on AM PAC & JH-HLM score  - Consult OT/PT to assist with strengthening/mobility   - Keep Call bell within reach  - Keep bed low and locked with side rails adjusted as appropriate  - Keep care items and personal belongings within reach  - Initiate and maintain comfort rounds  - Make Fall Risk Sign visible to staff  - Offer Toileting every 2 Hours, in advance of need  - Initiate/Maintain bed alarm  - Obtain necessary fall risk management equipment:   - Apply yellow socks and bracelet for high fall risk patients  - Consider moving patient to room near nurses station  Outcome: Progressing  Goal: Maintain or return to baseline ADL function  Description: INTERVENTIONS:  -  Assess patient's ability to carry out ADLs; assess patient's baseline for ADL function and identify physical deficits which impact ability to perform ADLs (bathing, care of mouth/teeth, toileting, grooming, dressing, etc.)  - Assess/evaluate cause of self-care deficits   - Assess range of motion  - Assess patient's mobility; develop plan if impaired  - Assess patient's need for assistive devices and provide as appropriate  - Encourage maximum independence but intervene and supervise when necessary  - Involve family in performance of ADLs  - Assess for home care needs following discharge   - Consider OT consult to assist with ADL evaluation and planning for discharge  - Provide patient education as appropriate  - Monitor functional capacity and physical performance, use of AM PAC & JH-HLM   - Monitor gait, balance and fatigue with ambulation    Outcome: Progressing  Goal: Maintains/Returns to pre admission functional level  Description: INTERVENTIONS:  - Perform AM-PAC 6 Click Basic Mobility/ Daily Activity assessment daily.  - Set and communicate daily mobility goal to care team  and patient/family/caregiver.   - Collaborate with rehabilitation services on mobility goals if consulted  - Perform Range of Motion 3 times a day.  - Reposition patient every 2 hours.  - Dangle patient 3 times a day  - Stand patient 3 times a day  - Ambulate patient 3 times a day  - Out of bed for meals   - Out of bed for toileting  - Record patient progress and toleration of activity level   Outcome: Progressing

## 2025-06-27 NOTE — ASSESSMENT & PLAN NOTE
Lab Results   Component Value Date    HGBA1C 6.6 (H) 06/18/2025       Recent Labs     06/26/25  1559 06/26/25 2018 06/27/25  0751 06/27/25  1101   POCGLU 286* 390* 162* 245*       Blood Sugar Average: Last 72 hrs:  (P) [id number]  Hold prehospital metformin and glipizide while inpatient resume on discharge  Blood sugar results reviewed  Continue sliding scale insulin coverage ACHS  Continue 5 units of Lantus at bedtime  Add 2 units of Humalog coverage with meals  CHO diet  Hypoglycemia protocol  Continue to monitor blood sugars and adjust regimen accordingly  BMP a.m.

## 2025-06-27 NOTE — ASSESSMENT & PLAN NOTE
Lab Results   Component Value Date    EGFR 84 06/27/2025    EGFR 81 06/26/2025    EGFR 82 06/25/2025    CREATININE 0.82 06/27/2025    CREATININE 0.88 06/26/2025    CREATININE 0.87 06/25/2025     POA with creatinine 1.49  Likely prerenal in setting of shock state which is now resolved  Creatinine currently at baseline, euvolemic on exam

## 2025-06-27 NOTE — ASSESSMENT & PLAN NOTE
Lab Results   Component Value Date    EGFR 84 06/27/2025    EGFR 81 06/26/2025    EGFR 82 06/25/2025    CREATININE 0.82 06/27/2025    CREATININE 0.88 06/26/2025    CREATININE 0.87 06/25/2025   This impacts antibiotic dosing. Upon review of patient's medical records it appears his baseline creatinine is approximately 0.7-0.9. Creatinine was 1.49 upon admission. Creatinine is now improved, was 0.82 on most recent BMP.  -monitor creatinine  -dose adjust antibiotic for renal function as needed  -avoid nephrotoxins  -volume management per primary service

## 2025-06-27 NOTE — DISCHARGE SUPPORT
[facility] [phone]). Stated auth will be closed out and courtesy SNF auth will be taken out. Stated auth can be resubmitted like normal once patient is stable for dc.

## 2025-06-27 NOTE — ASSESSMENT & PLAN NOTE
Appreciate ID who are following  1/2 blood culture on admission with MSSA bacteremia  Patient was noted to have MSSA and soft tissue culture with MSSA, Pseudomonas and Serratia  TTE without signs of obvious vegetation  Appreciate ID who are following   6/26 IV Zosyn and vancomycin were discontinued by ID.  Patient was initiated on cefepime 2 g IV every 12 hours dosed for renal function-will continue  6/26 blood cultures x 2 results pending  ID anticipates prolonged IV antibiotic treatment after blood cultures cleared in setting of right foot osteomyelitis.  Anticipate eventual PICC placement.

## 2025-06-27 NOTE — ASSESSMENT & PLAN NOTE
Leukocytosis, tachycardia, tachycardia, lactic acidosis, and hypotension requiring temporary pressor support. 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 No other clear source appreciated. The patient has clinically improved and no longer requires pressor support. He has transitioned out of ICU to med-surg care. Patient's remained afebrile. His WBC count has trended down. Repeat blood cultures are pending.   -antibiotic as below  -check CBCD and BMP tomorrow  -follow up repeat blood cultures  -monitor vitals  -supportive care

## 2025-06-27 NOTE — ASSESSMENT & PLAN NOTE
Likely secondary to R foot osteomyelitis. Patient's admission blood cultures with growth of MSSA in one. MSSA also present in R foot wound culture. He has no implanted devices or surgical hardware. TTE was a technically difficulty study but showed no valvular vegetations. Will not pursue DENISE at this time given clear source and given that patient will require prolonged antibiotic for foot treatment anyway. Patient has been transition to IV Cefepime for coverage of both his bacteremia and foot infection. He is tolerating antibiotic without difficulty. Repeat blood cultures are pending.    -continue IV cefepime, 2g q12, dosed for renal function  -check CBCD and BMP tomorrow  -follow up repeat blood cultures  -monitor vitals  -anticipate prolonged course of IV antibiotic treatment after cleared blood cultures and in setting of R foot osteomyelitis below  -anticipate eventual PICC placement when repeat blood cultures are negative >72 hours  -anticipate outpatient ID follow up after discharge

## 2025-06-27 NOTE — ASSESSMENT & PLAN NOTE
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 No definite MRI evidence of osteomyelitis. The ulcer is also in close approximation to the distal third metatarsal, and early osteomyelitis in that location cannot be excluded. No evidence of an abscess  S/p R TMA 6/23/2025  Wound culture with MSSA, Pseudomonas and Serratia  ID following, discontinued vancomycin and Zosyn 6/26 and started cefepime.    TTE without obvious signs of vegetation  6/26 follow-up blood cultures x 2 results pending.  ID feels patient will need long-term antibiotics and will need a line placed after clearance of these cultures  Case management continues to follow.  Rehab referrals have been made.

## 2025-06-27 NOTE — ASSESSMENT & PLAN NOTE
Noted on MRI imaging  Patient follows with podiatry outpatient for chronic wounds, podiatry continues to follow inpatient  S/p right TMA on 6/23/25  Operative cultures with MSSA, Pseudomonas and Serratia  Appreciate ID who are following-vancomycin and Zosyn discontinued 6/26, initiated cefepime   6/26 follow-up blood cultures x 2 results pending  See plan for MSSA bacteremia

## 2025-06-27 NOTE — PLAN OF CARE
Problem: Prexisting or High Potential for Compromised Skin Integrity  Goal: Skin integrity is maintained or improved  Description: INTERVENTIONS:  - Identify patients at risk for skin breakdown  - Assess and monitor skin integrity including under and around medical devices   - Assess and monitor nutrition and hydration status  - Monitor labs  - Assess for incontinence   - Turn and reposition patient  - Assist with mobility/ambulation  - Relieve pressure over joycelyn prominences   - Avoid friction and shearing  - Provide appropriate hygiene as needed including keeping skin clean and dry  - Evaluate need for skin moisturizer/barrier cream  - Collaborate with interdisciplinary team  - Patient/family teaching  - Consider wound care consult    Assess:  - Review Sae scale daily  - Clean and moisturize skin every shift  - Inspect skin when repositioning, toileting, and assisting with ADLS  - Assess under medical devices   - Assess extremities for adequate circulation and sensation     Bed Management:  - Have minimal linens on bed & keep smooth, unwrinkled  - Change linens as needed when moist or perspiring  - Avoid sitting or lying in one position for more than 2 hours while in bed?Keep HOB at 30 degrees   - Toileting:  - Offer bedside commode  - Assess for incontinence   - Use incontinent care products after each incontinent episode     Activity:  - Mobilize patient 3 times a day  - Encourage activity and walks on unit  - Encourage or provide ROM exercises   - Turn and reposition patient every 2 Hours  - Use appropriate equipment to lift or move patient in bed  - Instruct/ Assist with weight shifting every 60 when out of bed in chair  - Consider limitation of chair time 2 hour intervals    Skin Care:  - Avoid use of baby powder, tape, friction and shearing, hot water or constrictive clothing  - Relieve pressure over bony prominences using mepelex  - Do not massage red bony areas    Next Steps:  - Teach patient  strategies to minimize risks  - Consider consults to  interdisciplinary teams   Outcome: Progressing     Problem: PAIN - ADULT  Goal: Verbalizes/displays adequate comfort level or baseline comfort level  Description: Interventions:  - Encourage patient to monitor pain and request assistance  - Assess pain using appropriate pain scale  - Administer analgesics as ordered based on type and severity of pain and evaluate response  - Implement non-pharmacological measures as appropriate and evaluate response  - Consider cultural and social influences on pain and pain management  - Notify physician/advanced practitioner if interventions unsuccessful or patient reports new pain  - Educate patient/family on pain management process including their role and importance of  reporting pain   - Provide non-pharmacologic/complimentary pain relief interventions  Outcome: Progressing     Problem: INFECTION - ADULT  Goal: Absence or prevention of progression during hospitalization  Description: INTERVENTIONS:  - Assess and monitor for signs and symptoms of infection  - Monitor lab/diagnostic results  - Monitor all insertion sites, i.e. indwelling lines, tubes, and drains  - Monitor endotracheal if appropriate and nasal secretions for changes in amount and color  - [place] appropriate cooling/warming therapies per order  - Administer medications as ordered  - Instruct and encourage patient and family to use good hand hygiene technique  - Identify and instruct in appropriate isolation precautions for identified infection/condition  Outcome: Progressing  Goal: Absence of fever/infection during neutropenic period  Description: INTERVENTIONS:  - Monitor WBC  - Perform strict hand hygiene  - Limit to healthy visitors only  - No plants, dried, fresh or silk flowers with otoole in patient room  Outcome: Progressing     Problem: SAFETY ADULT  Goal: Patient will remain free of falls  Description: INTERVENTIONS:  - Educate patient/family on  patient safety including physical limitations  - Instruct patient to call for assistance with activity   - Consider consulting OT/PT to assist with strengthening/mobility based on AM PAC & JH-HLM score  - Consult OT/PT to assist with strengthening/mobility   - Keep Call bell within reach  - Keep bed low and locked with side rails adjusted as appropriate  - Keep care items and personal belongings within reach  - Initiate and maintain comfort rounds  - Make Fall Risk Sign visible to staff  - Offer Toileting every 2 Hours, in advance of need  - Initiate/Maintain bed alarm  - Obtain necessary fall risk management equipment:   - Apply yellow socks and bracelet for high fall risk patients  - Consider moving patient to room near nurses station  Outcome: Progressing  Goal: Maintain or return to baseline ADL function  Description: INTERVENTIONS:  -  Assess patient's ability to carry out ADLs; assess patient's baseline for ADL function and identify physical deficits which impact ability to perform ADLs (bathing, care of mouth/teeth, toileting, grooming, dressing, etc.)  - Assess/evaluate cause of self-care deficits   - Assess range of motion  - Assess patient's mobility; develop plan if impaired  - Assess patient's need for assistive devices and provide as appropriate  - Encourage maximum independence but intervene and supervise when necessary  - Involve family in performance of ADLs  - Assess for home care needs following discharge   - Consider OT consult to assist with ADL evaluation and planning for discharge  - Provide patient education as appropriate  - Monitor functional capacity and physical performance, use of AM PAC & JH-HLM   - Monitor gait, balance and fatigue with ambulation    Outcome: Progressing  Goal: Maintains/Returns to pre admission functional level  Description: INTERVENTIONS:  - Perform AM-PAC 6 Click Basic Mobility/ Daily Activity assessment daily.  - Set and communicate daily mobility goal to care team  and patient/family/caregiver.   - Collaborate with rehabilitation services on mobility goals if consulted  - Perform Range of Motion 3 times a day.  - Reposition patient every 2 hours.  - Dangle patient 3 times a day  - Stand patient 3 times a day  - Ambulate patient 3 times a day  - Out of bed for meals   - Out of bed for toileting  - Record patient progress and toleration of activity level   Outcome: Progressing     Problem: Knowledge Deficit  Goal: Patient/family/caregiver demonstrates understanding of disease process, treatment plan, medications, and discharge instructions  Description: Complete learning assessment and assess knowledge base.  Interventions:  - Provide teaching at level of understanding  - Provide teaching via preferred learning methods  Outcome: Progressing     Problem: DISCHARGE PLANNING  Goal: Discharge to home or other facility with appropriate resources  Description: INTERVENTIONS:  - Identify barriers to discharge w/patient and caregiver  - Arrange for needed discharge resources and transportation as appropriate  - Identify discharge learning needs (meds, wound care, etc.)  - Arrange for interpretive services to assist at discharge as needed  - Refer to Case Management Department for coordinating discharge planning if the patient needs post-hospital services based on physician/advanced practitioner order or complex needs related to functional status, cognitive ability, or social support system  Outcome: Progressing     Problem: Nutrition/Hydration-ADULT  Goal: Nutrient/Hydration intake appropriate for improving, restoring or maintaining nutritional needs  Description: Monitor and assess patient's nutrition/hydration status for malnutrition. Collaborate with interdisciplinary team and initiate plan and interventions as ordered.  Monitor patient's weight and dietary intake as ordered or per policy. Utilize nutrition screening tool and intervene as necessary. Determine patient's food  preferences and provide high-protein, high-caloric foods as appropriate.     INTERVENTIONS:  - Monitor oral intake, urinary output, labs, and treatment plans  - Assess nutrition and hydration status and recommend course of action  - Evaluate amount of meals eaten  - Assist patient with eating if necessary   - Allow adequate time for meals  - Recommend/ encourage appropriate diets, oral nutritional supplements, and vitamin/mineral supplements  - Order, calculate, and assess calorie counts as needed  - Recommend, monitor, and adjust tube feedings and TPN/PPN based on assessed needs  - Assess need for intravenous fluids  - Provide specific nutrition/hydration education as appropriate  - Include patient/family/caregiver in decisions related to nutrition  Outcome: Progressing     Problem: CARDIOVASCULAR - ADULT  Goal: Maintains optimal cardiac output and hemodynamic stability  Description: INTERVENTIONS:  - Monitor I/O, vital signs and rhythm  - Monitor for S/S and trends of decreased cardiac output  - Administer and titrate ordered vasoactive medications to optimize hemodynamic stability  - Assess quality of pulses, skin color and temperature  - Assess for signs of decreased coronary artery perfusion  - Instruct patient to report change in severity of symptoms  Outcome: Progressing  Goal: Absence of cardiac dysrhythmias or at baseline rhythm  Description: INTERVENTIONS:  - Continuous cardiac monitoring, vital signs, obtain 12 lead EKG if ordered  - Administer antiarrhythmic and heart rate control medications as ordered  - Monitor electrolytes and administer replacement therapy as ordered  Outcome: Progressing     Problem: GASTROINTESTINAL - ADULT  Goal: Minimal or absence of nausea and/or vomiting  Description: INTERVENTIONS:  - Administer IV fluids if ordered to ensure adequate hydration  - Maintain NPO status until nausea and vomiting are resolved  - Nasogastric tube if ordered  - Administer ordered antiemetic  medications as needed  - Provide nonpharmacologic comfort measures as appropriate  - Advance diet as tolerated, if ordered  - Consider nutrition services referral to assist patient with adequate nutrition and appropriate food choices  Outcome: Progressing  Goal: Maintains or returns to baseline bowel function  Description: INTERVENTIONS:  - Assess bowel function  - Encourage oral fluids to ensure adequate hydration  - Administer IV fluids if ordered to ensure adequate hydration  - Administer ordered medications as needed  - Encourage mobilization and activity  - Consider nutritional services referral to assist patient with adequate nutrition and appropriate food choices  Outcome: Progressing  Goal: Maintains adequate nutritional intake  Description: INTERVENTIONS:  - Monitor percentage of each meal consumed  - Identify factors contributing to decreased intake, treat as appropriate  - Assist with meals as needed  - Monitor I&O, weight, and lab values if indicated  - Obtain nutrition services referral as needed  Outcome: Progressing  Goal: Oral mucous membranes remain intact  Description: INTERVENTIONS  - Assess oral mucosa and hygiene practices  - Implement preventative oral hygiene regimen  - Implement oral medicated treatments as ordered  - Initiate Nutrition services referral as needed  Outcome: Progressing     Problem: GENITOURINARY - ADULT  Goal: Maintains or returns to baseline urinary function  Description: INTERVENTIONS:  - Assess urinary function  - Encourage oral fluids to ensure adequate hydration if ordered  - Administer IV fluids as ordered to ensure adequate hydration  - Administer ordered medications as needed  - Offer frequent toileting  - Follow urinary retention protocol if ordered  Outcome: Progressing  Goal: Absence of urinary retention  Description: INTERVENTIONS:  - Assess patient’s ability to void and empty bladder  - Monitor I/O  - Bladder scan as needed  - Discuss with physician/AP  medications to alleviate retention as needed  - Discuss catheterization for long term situations as appropriate  Outcome: Progressing     Problem: SKIN/TISSUE INTEGRITY - ADULT  Goal: Skin Integrity remains intact(Skin Breakdown Prevention)  Description: Assess:  -Perform Sae assessment every shift  -Clean and moisturize skin every shift  -Inspect skin when repositioning, toileting, and assisting with ADLS  -Assess under medical devices   -Assess extremities for adequate circulation and sensation     Bed Management:  -Have minimal linens on bed & keep smooth, unwrinkled  -Change linens as needed when moist or perspiring  -Avoid sitting or lying in one position for more than 2 hours while in bed  -Keep HOB at 30 degrees     Toileting:  -Offer bedside commode  -Assess for incontinence  -Use incontinent care products after each incontinent episode    Activity:  -Mobilize patient 3 times a day  -Encourage activity and walks on unit  -Encourage or provide ROM exercises   -Turn and reposition patient every 2 Hours  -Use appropriate equipment to lift or move patient in bed  -Instruct/ Assist with weight shifting every hour when out of bed in chair  -Consider limitation of chair time 4 hour intervals    Skin Care:  -Avoid use of baby powder, tape, friction and shearing, hot water or constrictive clothing  -Relieve pressure over bony prominences using foam dressings  -Do not massage red bony areas    Outcome: Progressing  Goal: Incision(s), wounds(s) or drain site(s) healing without S/S of infection  Description: INTERVENTIONS  - Assess and document dressing, incision, wound bed, drain sites and surrounding tissue  - Provide patient and family education  - Perform skin care/dressing changes every shift  Outcome: Progressing  Goal: Pressure injury heals and does not worsen  Description: Interventions:  - Implement low air loss mattress or specialty surface (Criteria met)  - Apply silicone foam dressing  - Instruct/assist  with weight shifting every hour when in chair   - Limit chair time to 4 hour intervals  - Use special pressure reducing interventions such as waffle cushion when in chair   - Apply fecal or urinary incontinence containment device   - Perform passive or active ROM every shift  - Turn and reposition patient & offload bony prominences every 2 hours   - Utilize friction reducing device or surface for transfers   Outcome: Progressing     Problem: Potential for Falls  Goal: Patient will remain free of falls  Description: INTERVENTIONS:  - Educate patient/family on patient safety including physical limitations  - Instruct patient to call for assistance with activity   - Consider consulting OT/PT to assist with strengthening/mobility based on AM PAC & -HL score  - Consult OT/PT to assist with strengthening/mobility   - Keep Call bell within reach  - Keep bed low and locked with side rails adjusted as appropriate  - Keep care items and personal belongings within reach  - Initiate and maintain comfort rounds  - Make Fall Risk Sign visible to staff  - Offer Toileting every 2 Hours, in advance of need  - Initiate/Maintain bed alarm  - Obtain necessary fall risk management equipment:   - Apply yellow socks and bracelet for high fall risk patients  - Consider moving patient to room near nurses station  Outcome: Progressing

## 2025-06-27 NOTE — PLAN OF CARE
Problem: PHYSICAL THERAPY ADULT  Goal: Performs mobility at highest level of function for planned discharge setting.  See evaluation for individualized goals.  Description: Treatment/Interventions: Functional transfer training, Therapeutic exercise, Endurance training, Gait training, Bed mobility, Equipment eval/education, Elevations  Equipment Recommended:  (TBD)       See flowsheet documentation for full assessment, interventions and recommendations.  Outcome: Progressing  Note: Prognosis: Fair  Problem List: Decreased strength, Decreased endurance, Impaired balance, Decreased mobility, Decreased skin integrity, Pain  Assessment: Pt seen for PT treatment session this date with interventions consisting of Therapeutic exercise consisting of: AROM 2 sets of 15 reps B LE in semireclined in recliner position and therapeutic activity consisting of training: bed mobility. Pt agreeable to PT treatment session upon arrival, pt found seated OOB in recliner, in no apparent distress and responsive. In comparison to previous session, pt with improvements in strength and endurance . Post session: pt returned back to recliner, chair alarm engaged, all needs in reach, and RN notified of session findings/recommendations. Continue to recommend Level I (Maximum Resource Intensity) at time of d/c in order to maximize pt's functional independence and safety w/ mobility. Pt continues to be functioning below baseline level. PT will continue to see pt during current hospitalization in order to address the deficits listed above and provide interventions consistent w/ POC in effort to achieve STGs.    Nai Lynn PTA  Barriers to Discharge: Inaccessible home environment     Rehab Resource Intensity Level, PT: I (Maximum Resource Intensity)    See flowsheet documentation for full assessment.

## 2025-06-27 NOTE — CASE MANAGEMENT
Case Management Discharge Planning Note    Patient name Gold Van  Location /-01 MRN [id number]  : 1945 Date 2025       Current Admission Date: 2025  Current Admission Diagnosis:Septic shock ([facility])   Patient Active Problem List    Diagnosis Date Noted    MSSA bacteremia 2025    Osteomyelitis of right foot ([facility]) 2025    Allergy to antibiotic 2025    Pressure injury of skin of sacral region 2025    Metabolic acidosis 2025    Ambulatory dysfunction 2025    Osteomyelitis ([facility]) 2025    Anemia 2025    Pulmonary hypertension ([facility]) 2025    Septic shock ([facility]) 2025    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 ([facility]) 2025    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 ([facility]) 2025    PAF (paroxysmal atrial fibrillation) ([facility]) 2025    Chronic heart failure with preserved ejection fraction (HFpEF) ([facility]) 2025    Ulcer of right foot with fat layer exposed secondary to osteomyelitis 2025    Severe peripheral arterial disease ([facility]) 2024    Moderate protein-calorie malnutrition ([facility]) 10/09/2024    Gastroesophageal reflux disease without esophagitis 10/04/2024    Neuropathy 10/04/2024    Foot drop, left 10/04/2024    CVA (cerebrovascular accident) ([facility]) 10/02/2024    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    History of pneumothorax 2024    Non-recurrent bilateral inguinal hernia without obstruction or gangrene 2024    Pruritic condition 2024    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 2021    Acute renal failure superimposed on stage 2 chronic kidney disease  ([facility]) 2021    Hyponatremia 2021    Lung nodule 2021    Type 2 diabetes mellitus with complication, without long-term current use of insulin ([facility]) 10/23/2017    Essential hypertension 10/23/2017    Mild intermittent asthma without  complication 10/23/2017    Mixed hyperlipidemia 10/23/2017      LOS (days): 10  Geometric Mean LOS (GMLOS) (days): 4.9  Days to GMLOS:-5.3     OBJECTIVE:  Risk of Unplanned Readmission Score: 34.13         Current admission status: Inpatient   Preferred Pharmacy:   [facility] [place] - [address]  [address]  [place] [address]  Phone: [phone] Fax: [phone]    [facility] - [place], [place] - [address]  [address] [facility] [address]  Phone: [phone] Fax: [phone]    [facility] (Replaced by [facility]) #[id number] [place], [place] - [address] [address]  Phone: [phone] Fax: [phone]    Primary Care Provider: Shayne Diaz MD    Primary Insurance: [facility] REP  Secondary Insurance:     DISCHARGE DETAILS:       Freedom of Choice: Yes  Comments - Freedom of Choice: recommendation is for rehab- pt is not stable for d/c -  auth will be sent when pt is ready for d/c                               Other Referral/Resources/Interventions Provided:  Interventions: Acute Rehab  Referral Comments: recommendation is for maximum  rehab - new auth is needed and new referrals are needed- pt not stable for d/c- pt is not stable for d/c  due to mssa bactermia- cultures pending - picc line needed afterue cuture results- pt needs long term Iv antibiotics    Would you like to participate in our [facility] service program?  : No - Declined    Treatment Team Recommendation: Short Term Rehab (rehab need auth - TBD)

## 2025-06-27 NOTE — DISCHARGE SUPPORT
Per CM, [id number] will not be completed due to patient developing an infection and requiring further care/treatment. CM to retask when patient medically cleared for discharge. Unable to cancel IRF auth through [employer] due to pended denial.  left for Brianna Ordaz (P#: [phone]) to have auth cancelled. Reference Number: [id number]. CM Notified.

## 2025-06-28 LAB
ANION GAP SERPL CALCULATED.3IONS-SCNC: 6 MMOL/L (ref 4–13)
BASOPHILS # BLD AUTO: 0.03 THOUSANDS/ÂΜL (ref 0–0.1)
BASOPHILS NFR BLD AUTO: 1 % (ref 0–1)
BUN SERPL-MCNC: 20 MG/DL (ref 5–25)
CALCIUM SERPL-MCNC: 9 MG/DL (ref 8.4–10.2)
CHLORIDE SERPL-SCNC: 101 MMOL/L (ref 96–108)
CO2 SERPL-SCNC: 25 MMOL/L (ref 21–32)
CREAT SERPL-MCNC: 0.76 MG/DL (ref 0.6–1.3)
EOSINOPHIL # BLD AUTO: 0.15 THOUSAND/ÂΜL (ref 0–0.61)
EOSINOPHIL NFR BLD AUTO: 2 % (ref 0–6)
ERYTHROCYTE [DISTWIDTH] IN BLOOD BY AUTOMATED COUNT: 15.2 % (ref 11.6–15.1)
GFR SERPL CREATININE-BSD FRML MDRD: 86 ML/MIN/1.73SQ M
GLUCOSE SERPL-MCNC: 185 MG/DL (ref 65–140)
GLUCOSE SERPL-MCNC: 209 MG/DL (ref 65–140)
GLUCOSE SERPL-MCNC: 225 MG/DL (ref 65–140)
GLUCOSE SERPL-MCNC: 261 MG/DL (ref 65–140)
GLUCOSE SERPL-MCNC: 309 MG/DL (ref 65–140)
HCT VFR BLD AUTO: 30.5 % (ref 36.5–49.3)
HGB BLD-MCNC: 9.7 G/DL (ref 12–17)
IMM GRANULOCYTES # BLD AUTO: 0.04 THOUSAND/UL (ref 0–0.2)
IMM GRANULOCYTES NFR BLD AUTO: 1 % (ref 0–2)
LYMPHOCYTES # BLD AUTO: 1.34 THOUSANDS/ÂΜL (ref 0.6–4.47)
LYMPHOCYTES NFR BLD AUTO: 21 % (ref 14–44)
MCH RBC QN AUTO: 28.1 PG (ref 26.8–34.3)
MCHC RBC AUTO-ENTMCNC: 31.8 G/DL (ref 31.4–37.4)
MCV RBC AUTO: 88 FL (ref 82–98)
MONOCYTES # BLD AUTO: 0.57 THOUSAND/ÂΜL (ref 0.17–1.22)
MONOCYTES NFR BLD AUTO: 9 % (ref 4–12)
NEUTROPHILS # BLD AUTO: 4.29 THOUSANDS/ÂΜL (ref 1.85–7.62)
NEUTS SEG NFR BLD AUTO: 66 % (ref 43–75)
NRBC BLD AUTO-RTO: 0 /100 WBCS
PLATELET # BLD AUTO: 346 THOUSANDS/UL (ref 149–390)
PMV BLD AUTO: 9.4 FL (ref 8.9–12.7)
POTASSIUM SERPL-SCNC: 4.1 MMOL/L (ref 3.5–5.3)
RBC # BLD AUTO: 3.45 MILLION/UL (ref 3.88–5.62)
SODIUM SERPL-SCNC: 132 MMOL/L (ref 135–147)
WBC # BLD AUTO: 6.42 THOUSAND/UL (ref 4.31–10.16)

## 2025-06-28 PROCEDURE — 80048 BASIC METABOLIC PNL TOTAL CA: CPT

## 2025-06-28 PROCEDURE — 99232 SBSQ HOSP IP/OBS MODERATE 35: CPT

## 2025-06-28 PROCEDURE — 82948 REAGENT STRIP/BLOOD GLUCOSE: CPT

## 2025-06-28 PROCEDURE — 85025 COMPLETE CBC W/AUTO DIFF WBC: CPT

## 2025-06-28 PROCEDURE — 94664 DEMO&/EVAL PT USE INHALER: CPT

## 2025-06-28 RX ADMIN — INSULIN LISPRO 2 UNITS: 100 INJECTION, SOLUTION INTRAVENOUS; SUBCUTANEOUS at 12:18

## 2025-06-28 RX ADMIN — ATORVASTATIN CALCIUM 40 MG: 40 TABLET, FILM COATED ORAL at 17:23

## 2025-06-28 RX ADMIN — MICONAZOLE NITRATE: 20 CREAM TOPICAL at 17:27

## 2025-06-28 RX ADMIN — INSULIN GLARGINE 10 UNITS: 100 INJECTION, SOLUTION SUBCUTANEOUS at 22:52

## 2025-06-28 RX ADMIN — INSULIN LISPRO 2 UNITS: 100 INJECTION, SOLUTION INTRAVENOUS; SUBCUTANEOUS at 12:17

## 2025-06-28 RX ADMIN — CLOPIDOGREL 75 MG: 75 TABLET ORAL at 08:22

## 2025-06-28 RX ADMIN — INSULIN LISPRO 1 UNITS: 100 INJECTION, SOLUTION INTRAVENOUS; SUBCUTANEOUS at 08:24

## 2025-06-28 RX ADMIN — PREDNISONE 5 MG: 5 TABLET ORAL at 08:22

## 2025-06-28 RX ADMIN — Medication 1 TABLET: at 08:22

## 2025-06-28 RX ADMIN — INSULIN LISPRO 2 UNITS: 100 INJECTION, SOLUTION INTRAVENOUS; SUBCUTANEOUS at 22:52

## 2025-06-28 RX ADMIN — OXYCODONE HYDROCHLORIDE 5 MG: 5 TABLET ORAL at 20:35

## 2025-06-28 RX ADMIN — ATENOLOL 25 MG: 25 TABLET ORAL at 08:22

## 2025-06-28 RX ADMIN — INSULIN LISPRO 3 UNITS: 100 INJECTION, SOLUTION INTRAVENOUS; SUBCUTANEOUS at 17:24

## 2025-06-28 RX ADMIN — MICONAZOLE NITRATE: 20 CREAM TOPICAL at 08:27

## 2025-06-28 RX ADMIN — MONTELUKAST 10 MG: 10 TABLET, FILM COATED ORAL at 22:52

## 2025-06-28 RX ADMIN — SENNOSIDES AND DOCUSATE SODIUM 2 TABLET: 50; 8.6 TABLET ORAL at 22:52

## 2025-06-28 RX ADMIN — APIXABAN 5 MG: 5 TABLET, FILM COATED ORAL at 08:22

## 2025-06-28 RX ADMIN — INSULIN LISPRO 2 UNITS: 100 INJECTION, SOLUTION INTRAVENOUS; SUBCUTANEOUS at 17:25

## 2025-06-28 RX ADMIN — LISINOPRIL 5 MG: 5 TABLET ORAL at 08:22

## 2025-06-28 RX ADMIN — BUDESONIDE, GLYCOPYRROLATE, AND FORMOTEROL FUMARATE 2 PUFF: 160; 9; 4.8 AEROSOL, METERED RESPIRATORY (INHALATION) at 20:31

## 2025-06-28 RX ADMIN — APIXABAN 5 MG: 5 TABLET, FILM COATED ORAL at 17:23

## 2025-06-28 RX ADMIN — BUDESONIDE, GLYCOPYRROLATE, AND FORMOTEROL FUMARATE 2 PUFF: 160; 9; 4.8 AEROSOL, METERED RESPIRATORY (INHALATION) at 09:06

## 2025-06-28 RX ADMIN — CEFEPIME 2000 MG: 2 INJECTION, POWDER, FOR SOLUTION INTRAVENOUS at 12:17

## 2025-06-28 RX ADMIN — CEFEPIME 2000 MG: 2 INJECTION, POWDER, FOR SOLUTION INTRAVENOUS at 23:00

## 2025-06-28 RX ADMIN — FAMOTIDINE 20 MG: 20 TABLET, FILM COATED ORAL at 08:22

## 2025-06-28 RX ADMIN — ACETAMINOPHEN 650 MG: 325 TABLET ORAL at 12:18

## 2025-06-28 RX ADMIN — INSULIN LISPRO 2 UNITS: 100 INJECTION, SOLUTION INTRAVENOUS; SUBCUTANEOUS at 08:25

## 2025-06-28 NOTE — ASSESSMENT & PLAN NOTE
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 No definite MRI evidence of osteomyelitis. The ulcer is also in close approximation to the distal third metatarsal, and early osteomyelitis in that location cannot be excluded. No evidence of an abscess  S/p R TMA 6/23/2025  Wound culture with MSSA, Pseudomonas and Serratia  ID following, discontinued vancomycin and Zosyn 6/26 and started cefepime.    TTE without obvious signs of vegetation  6/26 follow-up blood cultures x 2 results negative after 24 hours.  ID feels patient will need long-term antibiotics and will need a line placed after clearance of these cultures  Case management continues to follow.  Rehab referrals have been made.

## 2025-06-28 NOTE — PROGRESS NOTES
Progress Note - Hospitalist   Name: Gold Van 79 y.o. male I MRN: [id number]  Unit/Bed#: -01 I Date of Admission: 6/17/2025   Date of Service: 6/28/2025 I Hospital Day: 11    Assessment & Plan  Septic shock (HCC)  POA, shock criteria have since resolved  Source: RLE osteo  1 of 2 blood cultures with MSSA  Hx serratia/pseudomonas/e faecalis wound infection in past  MRI R foot-Postsurgical changes of partial fourth and fifth ray resections with a soft tissue ulcer overlying the cut surfaces of the fourth and fifth metatarsals and findings concerning for early osteomyelitis in the residual fourth metatarsal. No definite MRI evidence of osteomyelitis. The ulcer is also in close approximation to the distal third metatarsal, and early osteomyelitis in that location cannot be excluded. No evidence of an abscess  S/p R TMA 6/23/2025  Wound culture with MSSA, Pseudomonas and Serratia  ID following, discontinued vancomycin and Zosyn 6/26 and started cefepime.    TTE without obvious signs of vegetation  6/26 follow-up blood cultures x 2 results negative after 24 hours.  ID feels patient will need long-term antibiotics and will need a line placed after clearance of these cultures  Case management continues to follow.  Rehab referrals have been made.   Acute renal failure superimposed on stage 2 chronic kidney disease  ([facility])  Lab Results   Component Value Date    EGFR 86 06/28/2025    EGFR 84 06/27/2025    EGFR 81 06/26/2025    CREATININE 0.76 06/28/2025    CREATININE 0.82 06/27/2025    CREATININE 0.88 06/26/2025     POA with creatinine 1.49  Likely prerenal in setting of shock state which is now resolved  Creatinine currently at baseline, euvolemic on exam  Avoid nephrotoxins and hypotension  BMP a.m.  Type 2 diabetes mellitus with complication, without long-term current use of insulin ([facility])  Lab Results   Component Value Date    HGBA1C 6.6 (H) 06/18/2025       Recent Labs     06/27/25  1623 06/27/25 2050  06/28/25  0707 06/28/25  1111   POCGLU 315* 235* 185* 225*       Blood Sugar Average: Last 72 hrs:  (P) 252.4729464823209192  Hold prehospital metformin and glipizide while inpatient resume on discharge  Blood sugar results reviewed  Continue sliding scale insulin coverage ACHS  Continue 5 units of Lantus at bedtime  Continue 2 units of Humalog coverage with meals  CHO diet  Hypoglycemia protocol  Continue to monitor blood sugars and adjust regimen accordingly  BMP a.m.  Essential hypertension  Shock state resolved  lisinopril and atenolol resumed 6/25 at prehospital doses  Blood pressure well-controlled  Monitor BP per protocol  CVA (cerebrovascular accident) ([facility])  No residual deficits  Continue home statin and Plavix  PAF (paroxysmal atrial fibrillation) ([facility])  Resumed prehospital atenolol 6/25,  shock state resolved  Had episodes of atrial fibrillation with RVR 6/21 treated with 3 doses of metoprolol IV followed by Cardizem.  No further episodes  Continue prehospital Eliquis  Mixed hyperlipidemia  Continue home statin  Hyponatremia  Chronically 130-134  Currently at baseline  Euvolemic on exam  Continue to trend, BMP a.m.  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)   Without exacerbation  Continue prehospital inhalers  Gastroesophageal reflux disease without esophagitis  Continue home PPI  Severe peripheral arterial disease ([facility])  Hx R SFA stent 5/13  Continue prehospital Plavix  Anemia  Baseline hemoglobin appears to be 8.5-9.5  Received 1 UPRBC 6/18 and 6/23  Hemoglobin stable and baseline, no signs of bleeding  CBC a.m.  Pressure injury of skin of sacral region  Continue wound care per protocol  Ambulatory dysfunction  RLE NWB  PT OT-recommend rehab placement  Case management is making rehab referrals-he is not currently medically stable for discharge is being treated for MSSA bacteremia  Moderate protein-calorie malnutrition (HCC)  Malnutrition Findings:        Nutrition  following  Monitor appetites                       BMI Findings:           Body mass index is 18.58 kg/m².     MSSA bacteremia  Appreciate ID who are following  1/2 blood culture on admission with MSSA bacteremia  Patient was noted to have MSSA and soft tissue culture with MSSA, Pseudomonas and Serratia  TTE without signs of obvious vegetation  Appreciate ID who are following   6/26 IV Zosyn and vancomycin were discontinued by ID.  Patient was initiated on cefepime 2 g IV every 12 hours dosed for renal function-will continue  6/26 blood cultures x 2 negative after 24 hours  ID anticipates prolonged IV antibiotic treatment after blood cultures cleared in setting of right foot osteomyelitis.  Anticipate eventual PICC placement.  Patient remains afebrile, no leukocytosis        Osteomyelitis of right foot (HCC)  Noted on MRI imaging  Patient follows with podiatry outpatient for chronic wounds, podiatry continues to follow inpatient  1 of 2 blood cultures on admission with MSSA  S/p right TMA on 6/23/25  Operative cultures with MSSA, Pseudomonas and Serratia  Appreciate ID who are following-vancomycin and Zosyn discontinued 6/26, initiated cefepime   6/26 cultures x 2 negative after 24 hours  See plan for MSSA bacteremia  Dressings per podiatry  Allergy to antibiotic  Appreciate ID who are following for MSSA bacteremia  Avoid ciprofloxacin and Bactrim as patient reports shortness of breath with these antibiotics    VTE Pharmacologic Prophylaxis: VTE Score: 3 Moderate Risk (Score 3-4) - Pharmacological DVT Prophylaxis Ordered: apixaban (Eliquis).    Mobility:   Basic Mobility Inpatient Raw Score: 8  JH-HLM Goal: 3: Sit at edge of bed  JH-HLM Achieved: 4: Move to chair/commode  JH-HLM Goal achieved. Continue to encourage appropriate mobility.    Patient Centered Rounds: I performed bedside rounds with nursing staff today.   Discussions with Specialists or Other Care Team Provider:  nursing, case management    Education  and Discussions with Family / Patient: Updated  (wife) via phone.    Current Length of Stay: 11 day(s)  Current Patient Status: Inpatient   Certification Statement: The patient will continue to require additional inpatient hospital stay due to MSSA bacteremia treating with IV antibiotics, ID following, repeat cultures negative at 24 hours.  ID suspects will need long-term antibiotics and PICC, pending TTE, repeat labs a.m.  Discharge Plan: Patient being treated for MSSA bacteremia ID is following.  Follow-up blood cultures are negative at 24 hours ID continues to follow and antibiotics are per ID orders.  Plan will be long-term antibiotics and patient will need PICC after clearance of blood cultures.  Case management continues to work on discharge planning rehab referrals have been made.  He is not medically stable for discharge at this time.    Code Status: Level 1 - Full Code    Subjective   Denies any dizziness, lightness, chest pain or palpitations.  Out of bed in chair.  Verbalizing needs.  Objective :  Temp:  [97.7 °F (36.5 °C)-98.1 °F (36.7 °C)] 98.1 °F (36.7 °C)  HR:  [73-85] 85  BP: (126-138)/(69-71) 132/70  Resp:  [18-20] 18  SpO2:  [96 %-98 %] 96 %    Body mass index is 18.58 kg/m².     Input and Output Summary (last 24 hours):     Intake/Output Summary (Last 24 hours) at 6/28/2025 1404  Last data filed at 6/28/2025 0620  Gross per 24 hour   Intake --   Output 1175 ml   Net -1175 ml       Physical Exam  Vitals reviewed.   Constitutional:       General: He is not in acute distress.     Appearance: He is well-developed.   HENT:      Head: Normocephalic and atraumatic.     Cardiovascular:      Rate and Rhythm: Normal rate and regular rhythm.      Heart sounds: No murmur heard.  Pulmonary:      Effort: Pulmonary effort is normal. No respiratory distress.      Breath sounds: Normal breath sounds. No wheezing or rales.   Abdominal:      General: There is no distension.      Palpations: Abdomen is  soft.      Tenderness: There is no abdominal tenderness. There is no guarding.     Musculoskeletal:         General: No swelling.     Skin:     General: Skin is warm and dry.      Capillary Refill: Capillary refill takes less than 2 seconds.      Findings: Erythema present.      Comments: Sacral erythema   right foot dressing intact     Neurological:      General: No focal deficit present.      Mental Status: He is alert and oriented to person, place, and time. Mental status is at baseline.     Psychiatric:         Mood and Affect: Mood normal.         Behavior: Behavior normal.         Thought Content: Thought content normal.         Judgment: Judgment normal.           Lines/Drains:              Lab Results: I have reviewed the following results:   Results from last 7 days   Lab Units 06/28/25  0516   WBC Thousand/uL 6.42   HEMOGLOBIN g/dL 9.7*   HEMATOCRIT % 30.5*   PLATELETS Thousands/uL 346   SEGS PCT % 66   LYMPHO PCT % 21   MONO PCT % 9   EOS PCT % 2     Results from last 7 days   Lab Units 06/28/25  0516   SODIUM mmol/L 132*   POTASSIUM mmol/L 4.1   CHLORIDE mmol/L 101   CO2 mmol/L 25   BUN mg/dL 20   CREATININE mg/dL 0.76   ANION GAP mmol/L 6   CALCIUM mg/dL 9.0   GLUCOSE RANDOM mg/dL 209*         Results from last 7 days   Lab Units 06/28/25  1111 06/28/25  0707 06/27/25  2050 06/27/25  1623 06/27/25  1101 06/27/25  0751 06/26/25  2018 06/26/25  1559 06/26/25  1059 06/26/25  0802 06/25/25  2034 06/25/25  1546   POC GLUCOSE mg/dl 225* 185* 235* 315* 245* 162* 390* 286* 274* 186* 290* 325*                 Recent Cultures (last 7 days):   Results from last 7 days   Lab Units 06/26/25  1123 06/23/25  1454   BLOOD CULTURE  No Growth at 24 hrs.  No Growth at 24 hrs.  --    GRAM STAIN RESULT   --  No Polys or Bacteria seen  No Polys or Bacteria seen   WOUND CULTURE   --  3 colonies Serratia marcescens*  3 colonies Pseudomonas aeruginosa*  2 colonies Staphylococcus aureus*  2 colonies  2+ Growth of  Staphylococcus aureus*       Imaging Results Review: I reviewed radiology reports from this admission including: MRI right foot, right foot x-ray.  Other Study Results Review: No additional pertinent studies reviewed.    Last 24 Hours Medication List:     Current Facility-Administered Medications:     acetaminophen (TYLENOL) tablet 650 mg, Q6H PRN    albuterol (PROVENTIL HFA,VENTOLIN HFA) inhaler 2 puff, Q4H PRN    albuterol inhalation solution 2.5 mg, Q6H PRN    apixaban (ELIQUIS) tablet 5 mg, BID    atenolol (TENORMIN) tablet 25 mg, Daily    atorvastatin (LIPITOR) tablet 40 mg, QPM    Budeson-Glycopyrrol-Formoterol 160-9-4.8 MCG/ACT AERO 2 puff, Q12H SANDRA    ceFEPime (MAXIPIME) 2,000 mg in dextrose 5 % 50 mL IVPB, Q12H, Last Rate: 2,000 mg (06/28/25 1217)    clopidogrel (PLAVIX) tablet 75 mg, Daily    diltiazem (CARDIZEM) injection 15 mg, Once    diphenhydrAMINE (BENADRYL) injection 25 mg, Q6H PRN    famotidine (PEPCID) tablet 20 mg, Daily    heparin (porcine) injection 1,800 Units, Q6H PRN    heparin (porcine) injection 3,600 Units, Q6H PRN    HYDROmorphone HCl (DILAUDID) injection 0.2 mg, Q2H PRN    insulin glargine (LANTUS) subcutaneous injection 10 Units 0.1 mL, HS    insulin lispro (HumALOG/ADMELOG) 100 units/mL subcutaneous injection 1-5 Units, TID AC **AND** Fingerstick Glucose (POCT), TID AC    insulin lispro (HumALOG/ADMELOG) 100 units/mL subcutaneous injection 1-5 Units, HS    insulin lispro (HumALOG/ADMELOG) 100 units/mL subcutaneous injection 2 Units, TID With Meals    lisinopril (ZESTRIL) tablet 5 mg, Daily    moisture barrier miconazole 2% cream (aka CALEB MOISTURE BARRIER ANTIFUNGAL CREAM), BID    montelukast (SINGULAIR) tablet 10 mg, HS    multivitamin-minerals (CENTRUM) tablet 1 tablet, Daily    naloxone (NARCAN) 0.04 mg/mL syringe 0.04 mg, Q1MIN PRN    ondansetron (ZOFRAN) injection 4 mg, Q6H PRN    oxyCODONE (ROXICODONE) split tablet 2.5 mg, Q4H PRN **OR** oxyCODONE (ROXICODONE) IR tablet 5 mg,  Q4H PRN    polyethylene glycol (MIRALAX) packet 17 g, Daily PRN    predniSONE tablet 5 mg, Daily    senna-docusate sodium (SENOKOT S) 8.6-50 mg per tablet 2 tablet, HS    Administrative Statements   Today, Patient Was Seen By: BELGICA Nelson  I have spent a total time of 37 minutes in caring for this patient on the day of the visit/encounter including Patient and family education, Counseling / Coordination of care, Documenting in the medical record, Reviewing/placing orders in the medical record (including tests, medications, and/or procedures), Obtaining or reviewing history  , and Communicating with other healthcare professionals .    **Please Note: This note may have been constructed using a voice recognition system.**

## 2025-06-28 NOTE — ASSESSMENT & PLAN NOTE
Lab Results   Component Value Date    HGBA1C 6.6 (H) 06/18/2025       Recent Labs     06/27/25  1623 06/27/25 2050 06/28/25  0707 06/28/25  1111   POCGLU 315* 235* 185* 225*       Blood Sugar Average: Last 72 hrs:  (P) 252.8543292516715155  Hold prehospital metformin and glipizide while inpatient resume on discharge  Blood sugar results reviewed  Continue sliding scale insulin coverage ACHS  Continue 5 units of Lantus at bedtime  Continue 2 units of Humalog coverage with meals  CHO diet  Hypoglycemia protocol  Continue to monitor blood sugars and adjust regimen accordingly  BMP a.m.

## 2025-06-28 NOTE — ASSESSMENT & PLAN NOTE
No residual deficits  Continue home statin and Plavix   Troponin level elevated Troponin level elevated Troponin level elevated Chronic kidney disease, stage III (moderate) Chronic kidney disease, unspecified stage HCAP (healthcare-associated pneumonia) Paroxysmal a-fib Chronic kidney disease, stage III (moderate) HCAP (healthcare-associated pneumonia) HCAP (healthcare-associated pneumonia) Troponin level elevated

## 2025-06-28 NOTE — ASSESSMENT & PLAN NOTE
Lab Results   Component Value Date    EGFR 86 06/28/2025    EGFR 84 06/27/2025    EGFR 81 06/26/2025    CREATININE 0.76 06/28/2025    CREATININE 0.82 06/27/2025    CREATININE 0.88 06/26/2025     POA with creatinine 1.49  Likely prerenal in setting of shock state which is now resolved  Creatinine currently at baseline, euvolemic on exam  Avoid nephrotoxins and hypotension  BMP a.m.

## 2025-06-28 NOTE — PLAN OF CARE
Problem: Prexisting or High Potential for Compromised Skin Integrity  Goal: Skin integrity is maintained or improved  Description: INTERVENTIONS:  - Identify patients at risk for skin breakdown  - Assess and monitor skin integrity including under and around medical devices   - Assess and monitor nutrition and hydration status  - Monitor labs  - Assess for incontinence   - Turn and reposition patient  - Assist with mobility/ambulation  - Relieve pressure over joycelyn prominences   - Avoid friction and shearing  - Provide appropriate hygiene as needed including keeping skin clean and dry  - Evaluate need for skin moisturizer/barrier cream  - Collaborate with interdisciplinary team  - Patient/family teaching  - Consider wound care consult    Assess:  - Review Sae scale daily  - Clean and moisturize skin every shift  - Inspect skin when repositioning, toileting, and assisting with ADLS  - Assess under medical devices   - Assess extremities for adequate circulation and sensation     Bed Management:  - Have minimal linens on bed & keep smooth, unwrinkled  - Change linens as needed when moist or perspiring  - Avoid sitting or lying in one position for more than 2 hours while in bed?Keep HOB at 30 degrees   - Toileting:  - Offer bedside commode  - Assess for incontinence   - Use incontinent care products after each incontinent episode     Activity:  - Mobilize patient 3 times a day  - Encourage activity and walks on unit  - Encourage or provide ROM exercises   - Turn and reposition patient every 2 Hours  - Use appropriate equipment to lift or move patient in bed  - Instruct/ Assist with weight shifting every 60 when out of bed in chair  - Consider limitation of chair time 2 hour intervals    Skin Care:  - Avoid use of baby powder, tape, friction and shearing, hot water or constrictive clothing  - Relieve pressure over bony prominences using mepelex  - Do not massage red bony areas    Next Steps:  - Teach patient  strategies to minimize risks  - Consider consults to  interdisciplinary teams   Outcome: Progressing     Problem: PAIN - ADULT  Goal: Verbalizes/displays adequate comfort level or baseline comfort level  Description: Interventions:  - Encourage patient to monitor pain and request assistance  - Assess pain using appropriate pain scale  - Administer analgesics as ordered based on type and severity of pain and evaluate response  - Implement non-pharmacological measures as appropriate and evaluate response  - Consider cultural and social influences on pain and pain management  - Notify physician/advanced practitioner if interventions unsuccessful or patient reports new pain  - Educate patient/family on pain management process including their role and importance of  reporting pain   - Provide non-pharmacologic/complimentary pain relief interventions  Outcome: Progressing     Problem: INFECTION - ADULT  Goal: Absence or prevention of progression during hospitalization  Description: INTERVENTIONS:  - Assess and monitor for signs and symptoms of infection  - Monitor lab/diagnostic results  - Monitor all insertion sites, i.e. indwelling lines, tubes, and drains  - Monitor endotracheal if appropriate and nasal secretions for changes in amount and color  - Shannon appropriate cooling/warming therapies per order  - Administer medications as ordered  - Instruct and encourage patient and family to use good hand hygiene technique  - Identify and instruct in appropriate isolation precautions for identified infection/condition  Outcome: Progressing  Goal: Absence of fever/infection during neutropenic period  Description: INTERVENTIONS:  - Monitor WBC  - Perform strict hand hygiene  - Limit to healthy visitors only  - No plants, dried, fresh or silk flowers with otoole in patient room  Outcome: Progressing     Problem: SAFETY ADULT  Goal: Patient will remain free of falls  Description: INTERVENTIONS:  - Educate patient/family on  patient safety including physical limitations  - Instruct patient to call for assistance with activity   - Consider consulting OT/PT to assist with strengthening/mobility based on AM PAC & JH-HLM score  - Consult OT/PT to assist with strengthening/mobility   - Keep Call bell within reach  - Keep bed low and locked with side rails adjusted as appropriate  - Keep care items and personal belongings within reach  - Initiate and maintain comfort rounds  - Make Fall Risk Sign visible to staff  - Offer Toileting every 2 Hours, in advance of need  - Initiate/Maintain bed alarm  - Obtain necessary fall risk management equipment:   - Apply yellow socks and bracelet for high fall risk patients  - Consider moving patient to room near nurses station  Outcome: Progressing  Goal: Maintain or return to baseline ADL function  Description: INTERVENTIONS:  -  Assess patient's ability to carry out ADLs; assess patient's baseline for ADL function and identify physical deficits which impact ability to perform ADLs (bathing, care of mouth/teeth, toileting, grooming, dressing, etc.)  - Assess/evaluate cause of self-care deficits   - Assess range of motion  - Assess patient's mobility; develop plan if impaired  - Assess patient's need for assistive devices and provide as appropriate  - Encourage maximum independence but intervene and supervise when necessary  - Involve family in performance of ADLs  - Assess for home care needs following discharge   - Consider OT consult to assist with ADL evaluation and planning for discharge  - Provide patient education as appropriate  - Monitor functional capacity and physical performance, use of AM PAC & JH-HLM   - Monitor gait, balance and fatigue with ambulation    Outcome: Progressing  Goal: Maintains/Returns to pre admission functional level  Description: INTERVENTIONS:  - Perform AM-PAC 6 Click Basic Mobility/ Daily Activity assessment daily.  - Set and communicate daily mobility goal to care team  and patient/family/caregiver.   - Collaborate with rehabilitation services on mobility goals if consulted  - Perform Range of Motion 3 times a day.  - Reposition patient every 2 hours.  - Dangle patient 3 times a day  - Stand patient 3 times a day  - Ambulate patient 3 times a day  - Out of bed for meals   - Out of bed for toileting  - Record patient progress and toleration of activity level   Outcome: Progressing

## 2025-06-28 NOTE — PLAN OF CARE
Problem: SAFETY ADULT  Goal: Patient will remain free of falls  Description: INTERVENTIONS:  - Educate patient/family on patient safety including physical limitations  - Instruct patient to call for assistance with activity   - Consider consulting OT/PT to assist with strengthening/mobility based on AM PAC & JH-HLM score  - Consult OT/PT to assist with strengthening/mobility   - Keep Call bell within reach  - Keep bed low and locked with side rails adjusted as appropriate  - Keep care items and personal belongings within reach  - Initiate and maintain comfort rounds  - Make Fall Risk Sign visible to staff  - Offer Toileting every 2 Hours, in advance of need  - Initiate/Maintain bed alarm  - Obtain necessary fall risk management equipment:   - Apply yellow socks and bracelet for high fall risk patients  - Consider moving patient to room near nurses station  Outcome: Progressing  Goal: Maintain or return to baseline ADL function  Description: INTERVENTIONS:  -  Assess patient's ability to carry out ADLs; assess patient's baseline for ADL function and identify physical deficits which impact ability to perform ADLs (bathing, care of mouth/teeth, toileting, grooming, dressing, etc.)  - Assess/evaluate cause of self-care deficits   - Assess range of motion  - Assess patient's mobility; develop plan if impaired  - Assess patient's need for assistive devices and provide as appropriate  - Encourage maximum independence but intervene and supervise when necessary  - Involve family in performance of ADLs  - Assess for home care needs following discharge   - Consider OT consult to assist with ADL evaluation and planning for discharge  - Provide patient education as appropriate  - Monitor functional capacity and physical performance, use of AM PAC & JH-HLM   - Monitor gait, balance and fatigue with ambulation    Outcome: Progressing  Goal: Maintains/Returns to pre admission functional level  Description: INTERVENTIONS:  -  Perform AM-PAC 6 Click Basic Mobility/ Daily Activity assessment daily.  - Set and communicate daily mobility goal to care team and patient/family/caregiver.   - Collaborate with rehabilitation services on mobility goals if consulted  - Perform Range of Motion 3 times a day.  - Reposition patient every 2 hours.  - Dangle patient 3 times a day  - Stand patient 3 times a day  - Ambulate patient 3 times a day  - Out of bed for meals   - Out of bed for toileting  - Record patient progress and toleration of activity level   Outcome: Progressing

## 2025-06-28 NOTE — ASSESSMENT & PLAN NOTE
Noted on MRI imaging  Patient follows with podiatry outpatient for chronic wounds, podiatry continues to follow inpatient  1 of 2 blood cultures on admission with MSSA  S/p right TMA on 6/23/25  Operative cultures with MSSA, Pseudomonas and Serratia  Appreciate ID who are following-vancomycin and Zosyn discontinued 6/26, initiated cefepime   6/26 cultures x 2 negative after 24 hours  See plan for MSSA bacteremia  Dressings per podiatry

## 2025-06-28 NOTE — ASSESSMENT & PLAN NOTE
Shock state resolved  lisinopril and atenolol resumed 6/25 at prehospital doses  Blood pressure well-controlled  Monitor BP per protocol

## 2025-06-28 NOTE — NURSING NOTE
Patient refused to get out of bed with assistance. Patient repositioned many times for patient comfort, patient reports that no matter the position he is in he is not comfortable. Breakfast set up. Patient positioned with HOB 30-45 degrees with waffle cushion under sacrum per patient request. Educated on the importance of mobility and repositioning due to wound on sacrum.

## 2025-06-28 NOTE — NURSING NOTE
Patient assisted OOB with use of rolling walker. Patient set up to eat breakfast. States he is more comfortable in the chair.

## 2025-06-28 NOTE — ASSESSMENT & PLAN NOTE
Appreciate ID who are following  1/2 blood culture on admission with MSSA bacteremia  Patient was noted to have MSSA and soft tissue culture with MSSA, Pseudomonas and Serratia  TTE without signs of obvious vegetation  Appreciate ID who are following   6/26 IV Zosyn and vancomycin were discontinued by ID.  Patient was initiated on cefepime 2 g IV every 12 hours dosed for renal function-will continue  6/26 blood cultures x 2 negative after 24 hours  ID anticipates prolonged IV antibiotic treatment after blood cultures cleared in setting of right foot osteomyelitis.  Anticipate eventual PICC placement.  Patient remains afebrile, no leukocytosis

## 2025-06-28 NOTE — NURSING NOTE
Patient bathed, all dressings changed including skin tears, surgical incision and wound care completed to buttock. New media uploaded to chart. Patient assisted back to bed with use of rolling walker.

## 2025-06-29 LAB
ANION GAP SERPL CALCULATED.3IONS-SCNC: 8 MMOL/L (ref 4–13)
BUN SERPL-MCNC: 22 MG/DL (ref 5–25)
CALCIUM SERPL-MCNC: 9.1 MG/DL (ref 8.4–10.2)
CHLORIDE SERPL-SCNC: 101 MMOL/L (ref 96–108)
CO2 SERPL-SCNC: 23 MMOL/L (ref 21–32)
CREAT SERPL-MCNC: 0.85 MG/DL (ref 0.6–1.3)
ERYTHROCYTE [DISTWIDTH] IN BLOOD BY AUTOMATED COUNT: 15.1 % (ref 11.6–15.1)
GFR SERPL CREATININE-BSD FRML MDRD: 82 ML/MIN/1.73SQ M
GLUCOSE SERPL-MCNC: 169 MG/DL (ref 65–140)
GLUCOSE SERPL-MCNC: 179 MG/DL (ref 65–140)
GLUCOSE SERPL-MCNC: 223 MG/DL (ref 65–140)
GLUCOSE SERPL-MCNC: 226 MG/DL (ref 65–140)
GLUCOSE SERPL-MCNC: 242 MG/DL (ref 65–140)
HCT VFR BLD AUTO: 32.4 % (ref 36.5–49.3)
HGB BLD-MCNC: 10.3 G/DL (ref 12–17)
MCH RBC QN AUTO: 27.9 PG (ref 26.8–34.3)
MCHC RBC AUTO-ENTMCNC: 31.8 G/DL (ref 31.4–37.4)
MCV RBC AUTO: 88 FL (ref 82–98)
PLATELET # BLD AUTO: 382 THOUSANDS/UL (ref 149–390)
PMV BLD AUTO: 9.5 FL (ref 8.9–12.7)
POTASSIUM SERPL-SCNC: 4.1 MMOL/L (ref 3.5–5.3)
RBC # BLD AUTO: 3.69 MILLION/UL (ref 3.88–5.62)
SODIUM SERPL-SCNC: 132 MMOL/L (ref 135–147)
WBC # BLD AUTO: 5.86 THOUSAND/UL (ref 4.31–10.16)

## 2025-06-29 PROCEDURE — 85027 COMPLETE CBC AUTOMATED: CPT

## 2025-06-29 PROCEDURE — 94760 N-INVAS EAR/PLS OXIMETRY 1: CPT

## 2025-06-29 PROCEDURE — 80048 BASIC METABOLIC PNL TOTAL CA: CPT

## 2025-06-29 PROCEDURE — 99232 SBSQ HOSP IP/OBS MODERATE 35: CPT

## 2025-06-29 PROCEDURE — 82948 REAGENT STRIP/BLOOD GLUCOSE: CPT

## 2025-06-29 PROCEDURE — 94664 DEMO&/EVAL PT USE INHALER: CPT

## 2025-06-29 RX ADMIN — PREDNISONE 5 MG: 5 TABLET ORAL at 09:03

## 2025-06-29 RX ADMIN — BUDESONIDE, GLYCOPYRROLATE, AND FORMOTEROL FUMARATE 2 PUFF: 160; 9; 4.8 AEROSOL, METERED RESPIRATORY (INHALATION) at 22:46

## 2025-06-29 RX ADMIN — APIXABAN 5 MG: 5 TABLET, FILM COATED ORAL at 17:36

## 2025-06-29 RX ADMIN — INSULIN GLARGINE 10 UNITS: 100 INJECTION, SOLUTION SUBCUTANEOUS at 21:10

## 2025-06-29 RX ADMIN — ATENOLOL 25 MG: 25 TABLET ORAL at 08:22

## 2025-06-29 RX ADMIN — INSULIN LISPRO 2 UNITS: 100 INJECTION, SOLUTION INTRAVENOUS; SUBCUTANEOUS at 08:28

## 2025-06-29 RX ADMIN — BUDESONIDE, GLYCOPYRROLATE, AND FORMOTEROL FUMARATE 2 PUFF: 160; 9; 4.8 AEROSOL, METERED RESPIRATORY (INHALATION) at 08:27

## 2025-06-29 RX ADMIN — MICONAZOLE NITRATE: 20 CREAM TOPICAL at 17:36

## 2025-06-29 RX ADMIN — INSULIN LISPRO 1 UNITS: 100 INJECTION, SOLUTION INTRAVENOUS; SUBCUTANEOUS at 11:41

## 2025-06-29 RX ADMIN — INSULIN LISPRO 2 UNITS: 100 INJECTION, SOLUTION INTRAVENOUS; SUBCUTANEOUS at 21:10

## 2025-06-29 RX ADMIN — CEFEPIME 2000 MG: 2 INJECTION, POWDER, FOR SOLUTION INTRAVENOUS at 11:39

## 2025-06-29 RX ADMIN — INSULIN LISPRO 2 UNITS: 100 INJECTION, SOLUTION INTRAVENOUS; SUBCUTANEOUS at 16:33

## 2025-06-29 RX ADMIN — INSULIN LISPRO 2 UNITS: 100 INJECTION, SOLUTION INTRAVENOUS; SUBCUTANEOUS at 11:41

## 2025-06-29 RX ADMIN — LISINOPRIL 5 MG: 5 TABLET ORAL at 08:22

## 2025-06-29 RX ADMIN — INSULIN LISPRO 2 UNITS: 100 INJECTION, SOLUTION INTRAVENOUS; SUBCUTANEOUS at 16:32

## 2025-06-29 RX ADMIN — CLOPIDOGREL 75 MG: 75 TABLET ORAL at 08:22

## 2025-06-29 RX ADMIN — APIXABAN 5 MG: 5 TABLET, FILM COATED ORAL at 08:22

## 2025-06-29 RX ADMIN — Medication 1 TABLET: at 08:22

## 2025-06-29 RX ADMIN — MONTELUKAST 10 MG: 10 TABLET, FILM COATED ORAL at 21:10

## 2025-06-29 RX ADMIN — SENNOSIDES AND DOCUSATE SODIUM 2 TABLET: 50; 8.6 TABLET ORAL at 21:10

## 2025-06-29 RX ADMIN — FAMOTIDINE 20 MG: 20 TABLET, FILM COATED ORAL at 08:22

## 2025-06-29 RX ADMIN — MICONAZOLE NITRATE 1 APPLICATION: 20 CREAM TOPICAL at 08:31

## 2025-06-29 RX ADMIN — OXYCODONE HYDROCHLORIDE 5 MG: 5 TABLET ORAL at 09:03

## 2025-06-29 RX ADMIN — ATORVASTATIN CALCIUM 40 MG: 40 TABLET, FILM COATED ORAL at 17:36

## 2025-06-29 NOTE — PLAN OF CARE
Problem: INFECTION - ADULT  Goal: Absence or prevention of progression during hospitalization  Description: INTERVENTIONS:  - Assess and monitor for signs and symptoms of infection  - Monitor lab/diagnostic results  - Monitor all insertion sites, i.e. indwelling lines, tubes, and drains  - Monitor endotracheal if appropriate and nasal secretions for changes in amount and color  - [place] appropriate cooling/warming therapies per order  - Administer medications as ordered  - Instruct and encourage patient and family to use good hand hygiene technique  - Identify and instruct in appropriate isolation precautions for identified infection/condition  Outcome: Progressing  Goal: Absence of fever/infection during neutropenic period  Description: INTERVENTIONS:  - Monitor WBC  - Perform strict hand hygiene  - Limit to healthy visitors only  - No plants, dried, fresh or silk flowers with otoole in patient room  Outcome: Progressing

## 2025-06-29 NOTE — PROGRESS NOTES
Progress Note - Hospitalist   Name: Gold Van 79 y.o. male I MRN: [id number]  Unit/Bed#: -01 I Date of Admission: 6/17/2025   Date of Service: 6/29/2025 I Hospital Day: 12    Assessment & Plan  Septic shock (HCC)  POA, shock criteria have since resolved  Source: RLE osteo  1 of 2 blood cultures with MSSA  Hx serratia/pseudomonas/e faecalis wound infection in past  MRI R foot-Postsurgical changes of partial fourth and fifth ray resections with a soft tissue ulcer overlying the cut surfaces of the fourth and fifth metatarsals and findings concerning for early osteomyelitis in the residual fourth metatarsal. No definite MRI evidence of osteomyelitis. The ulcer is also in close approximation to the distal third metatarsal, and early osteomyelitis in that location cannot be excluded. No evidence of an abscess  S/p R TMA 6/23/2025  Wound culture with MSSA, Pseudomonas and Serratia  ID following, discontinued vancomycin and Zosyn 6/26 and started cefepime.    TTE without obvious signs of vegetation  6/26 follow-up blood cultures x 2 results negative after 48 hours.  ID feels patient will need long-term antibiotics and will need a line placed after clearance of these cultures  Case management continues to follow.  Rehab referrals have been made.   Acute renal failure superimposed on stage 2 chronic kidney disease  ([facility])  Lab Results   Component Value Date    EGFR 82 06/29/2025    EGFR 86 06/28/2025    EGFR 84 06/27/2025    CREATININE 0.85 06/29/2025    CREATININE 0.76 06/28/2025    CREATININE 0.82 06/27/2025     POA with creatinine 1.49  Likely prerenal in setting of shock state which is now resolved  Creatinine currently at baseline, euvolemic on exam  Avoid nephrotoxins and hypotension  BMP a.m.  Type 2 diabetes mellitus with complication, without long-term current use of insulin ([facility])  Lab Results   Component Value Date    HGBA1C 6.6 (H) 06/18/2025       Recent Labs     06/28/25  1642 06/28/25 2108  06/29/25  0735 06/29/25  1112   POCGLU 309* 261* 169* 179*       Blood Sugar Average: Last 72 hrs:  (P) [id number]  Hold prehospital metformin and glipizide while inpatient resume on discharge  Blood sugar results reviewed  Continue sliding scale insulin coverage ACHS  Continue 5 units of Lantus at bedtime  Continue 2 units of Humalog coverage with meals  CHO diet  Hypoglycemia protocol  Continue to monitor blood sugars and adjust regimen accordingly  BMP a.m.  Essential hypertension  Shock state resolved  Continue lisinopril, atenolol  Monitor BP per protocol  CVA (cerebrovascular accident) ([facility])  No residual deficits  Continue home statin and Plavix  PAF (paroxysmal atrial fibrillation) ([facility])  Resumed prehospital atenolol 6/25,  shock state resolved  Had episodes of atrial fibrillation with RVR 6/21 treated with 3 doses of metoprolol IV followed by Cardizem.  No further episodes  Continue prehospital Eliquis  Mixed hyperlipidemia  Continue home statin  Hyponatremia  Chronically 130-134  Currently at baseline  Euvolemic on exam  Continue to trend, BMP a.m.  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  Pressure injury of skin of sacral region  Continue wound care per protocol  Ambulatory dysfunction  RLE NWB  PT OT-recommend rehab placement  Case management is making rehab referrals-he is not currently medically stable for discharge is being treated for MSSA bacteremia  Moderate protein-calorie malnutrition (HCC)  Malnutrition Findings:        Nutrition following  Monitor appetites                       BMI Findings:           Body mass  index is 18.58 kg/m².     MSSA bacteremia  Appreciate ID who are following  1/2 blood culture on admission with MSSA bacteremia  Patient was noted to have MSSA and soft tissue culture with MSSA, Pseudomonas and Serratia  TTE without signs of obvious vegetation  Appreciate ID who are following   6/26 IV Zosyn and vancomycin were discontinued by ID.  Patient was initiated on cefepime 2 g IV every 12 hours dosed for renal function-will continue  6/26 blood cultures x 2 negative after 48 hours  ID anticipates prolonged IV antibiotic treatment after blood cultures cleared in setting of right foot osteomyelitis.  Anticipate eventual PICC placement.  Patient remains afebrile, no leukocytosis        Osteomyelitis of right foot (HCC)  Noted on MRI imaging  Patient follows with podiatry outpatient for chronic wounds, podiatry continues to follow inpatient  1 of 2 blood cultures on admission with MSSA  S/p right TMA on 6/23/25  Operative cultures with MSSA, Pseudomonas and Serratia  Appreciate ID who are following-vancomycin and Zosyn discontinued 6/26, initiated cefepime   6/26 cultures x 2 negative after 48 hours  See plan for MSSA bacteremia  Dressings per podiatry  Allergy to antibiotic  Appreciate ID who are following for MSSA bacteremia  Avoid ciprofloxacin and Bactrim as patient reports shortness of breath with these antibiotics    VTE Pharmacologic Prophylaxis: VTE Score: 3 Moderate Risk (Score 3-4) - Pharmacological DVT Prophylaxis Ordered: apixaban (Eliquis).    Mobility:   Basic Mobility Inpatient Raw Score: 12  JH-HLM Goal: 4: Move to chair/commode  JH-HLM Achieved: 4: Move to chair/commode  JH-HLM Goal achieved. Continue to encourage appropriate mobility.    Patient Centered Rounds: I performed bedside rounds with nursing staff today.   Discussions with Specialists or Other Care Team Provider:  nursing, case management    Education and Discussions with Family / Patient: Updated  (wife) via  phone.    Current Length of Stay: 12 day(s)  Current Patient Status: Inpatient   Certification Statement: The patient will continue to require additional inpatient hospital stay due to MSSA bacteremia treating with IV antibiotics, ID following, repeat cultures negative at 48 hours.  ID suspects will need long-term antibiotics and PICC, pending TTE, repeat labs a.m.  Discharge Plan: Patient being treated for MSSA bacteremia ID is following.  Follow-up blood cultures are negative at 48 hours ID continues to follow and antibiotics are per ID orders.  Plan will be long-term antibiotics and patient will need PICC after clearance of blood cultures.  Case management continues to work on discharge planning rehab referrals have been made.      Code Status: Level 1 - Full Code    Subjective   Denies any dizziness, lightness, chest pain or palpitations.  Pleasant, verbalizes needs.    Objective :  Temp:  [97.5 °F (36.4 °C)-98.3 °F (36.8 °C)] 98.3 °F (36.8 °C)  HR:  [72-86] 86  BP: (111-143)/(55-76) 143/76  Resp:  [16-18] 18  SpO2:  [94 %-99 %] 94 %  O2 Device: None (Room air)    Body mass index is 18.58 kg/m².     Input and Output Summary (last 24 hours):     Intake/Output Summary (Last 24 hours) at 6/29/2025 1216  Last data filed at 6/28/2025 2339  Gross per 24 hour   Intake 480 ml   Output 625 ml   Net -145 ml       Physical Exam  Vitals reviewed.   Constitutional:       General: He is not in acute distress.     Appearance: He is well-developed.   HENT:      Head: Normocephalic and atraumatic.     Cardiovascular:      Rate and Rhythm: Normal rate and regular rhythm.      Heart sounds: No murmur heard.  Pulmonary:      Effort: Pulmonary effort is normal. No respiratory distress.      Breath sounds: Normal breath sounds. No wheezing or rales.   Abdominal:      General: There is no distension.      Palpations: Abdomen is soft.      Tenderness: There is no abdominal tenderness. There is no guarding.     Musculoskeletal:          General: No swelling.     Skin:     General: Skin is warm and dry.      Capillary Refill: Capillary refill takes less than 2 seconds.      Findings: Erythema present.      Comments: Sacral erythema   right foot dressing intact     Neurological:      General: No focal deficit present.      Mental Status: He is alert and oriented to person, place, and time. Mental status is at baseline.     Psychiatric:         Mood and Affect: Mood normal.         Behavior: Behavior normal.         Thought Content: Thought content normal.         Judgment: Judgment normal.           Lines/Drains:              Lab Results: I have reviewed the following results:   Results from last 7 days   Lab Units 06/29/25  0433 06/28/25  0516   WBC Thousand/uL 5.86 6.42   HEMOGLOBIN g/dL 10.3* 9.7*   HEMATOCRIT % 32.4* 30.5*   PLATELETS Thousands/uL 382 346   SEGS PCT %  --  66   LYMPHO PCT %  --  21   MONO PCT %  --  9   EOS PCT %  --  2     Results from last 7 days   Lab Units 06/29/25  0433   SODIUM mmol/L 132*   POTASSIUM mmol/L 4.1   CHLORIDE mmol/L 101   CO2 mmol/L 23   BUN mg/dL 22   CREATININE mg/dL 0.85   ANION GAP mmol/L 8   CALCIUM mg/dL 9.1   GLUCOSE RANDOM mg/dL 223*         Results from last 7 days   Lab Units 06/29/25  1112 06/29/25  0735 06/28/25  2108 06/28/25  1642 06/28/25  1111 06/28/25  0707 06/27/25  2050 06/27/25  1623 06/27/25  1101 06/27/25  0751 06/26/25  2018 06/26/25  1559   POC GLUCOSE mg/dl 179* 169* 261* 309* 225* 185* 235* 315* 245* 162* 390* 286*                 Recent Cultures (last 7 days):   Results from last 7 days   Lab Units 06/26/25  1123 06/23/25  1454   BLOOD CULTURE  No Growth at 48 hrs.  No Growth at 48 hrs.  --    GRAM STAIN RESULT   --  No Polys or Bacteria seen  No Polys or Bacteria seen   WOUND CULTURE   --  3 colonies Serratia marcescens*  3 colonies Pseudomonas aeruginosa*  2 colonies Staphylococcus aureus*  2 colonies  2+ Growth of Staphylococcus aureus*       Imaging Results Review: I  reviewed radiology reports from this admission including: MRI right foot, right foot x-ray.  Other Study Results Review: No additional pertinent studies reviewed.    Last 24 Hours Medication List:     Current Facility-Administered Medications:     acetaminophen (TYLENOL) tablet 650 mg, Q6H PRN    albuterol (PROVENTIL HFA,VENTOLIN HFA) inhaler 2 puff, Q4H PRN    albuterol inhalation solution 2.5 mg, Q6H PRN    apixaban (ELIQUIS) tablet 5 mg, BID    atenolol (TENORMIN) tablet 25 mg, Daily    atorvastatin (LIPITOR) tablet 40 mg, QPM    Budeson-Glycopyrrol-Formoterol 160-9-4.8 MCG/ACT AERO 2 puff, Q12H SANDRA    ceFEPime (MAXIPIME) 2,000 mg in dextrose 5 % 50 mL IVPB, Q12H, Last Rate: 2,000 mg (06/29/25 1139)    clopidogrel (PLAVIX) tablet 75 mg, Daily    diltiazem (CARDIZEM) injection 15 mg, Once    diphenhydrAMINE (BENADRYL) injection 25 mg, Q6H PRN    famotidine (PEPCID) tablet 20 mg, Daily    heparin (porcine) injection 1,800 Units, Q6H PRN    heparin (porcine) injection 3,600 Units, Q6H PRN    HYDROmorphone HCl (DILAUDID) injection 0.2 mg, Q2H PRN    insulin glargine (LANTUS) subcutaneous injection 10 Units 0.1 mL, HS    insulin lispro (HumALOG/ADMELOG) 100 units/mL subcutaneous injection 1-5 Units, TID AC **AND** Fingerstick Glucose (POCT), TID AC    insulin lispro (HumALOG/ADMELOG) 100 units/mL subcutaneous injection 1-5 Units, HS    insulin lispro (HumALOG/ADMELOG) 100 units/mL subcutaneous injection 2 Units, TID With Meals    lisinopril (ZESTRIL) tablet 5 mg, Daily    moisture barrier miconazole 2% cream (aka CALEB MOISTURE BARRIER ANTIFUNGAL CREAM), BID    montelukast (SINGULAIR) tablet 10 mg, HS    multivitamin-minerals (CENTRUM) tablet 1 tablet, Daily    naloxone (NARCAN) 0.04 mg/mL syringe 0.04 mg, Q1MIN PRN    ondansetron (ZOFRAN) injection 4 mg, Q6H PRN    oxyCODONE (ROXICODONE) split tablet 2.5 mg, Q4H PRN **OR** oxyCODONE (ROXICODONE) IR tablet 5 mg, Q4H PRN    polyethylene glycol (MIRALAX) packet 17 g,  Daily PRN    predniSONE tablet 5 mg, Daily    senna-docusate sodium (SENOKOT S) 8.6-50 mg per tablet 2 tablet, HS    Administrative Statements   Today, Patient Was Seen By: BELGICA Nelson  I have spent a total time of 36 minutes in caring for this patient on the day of the visit/encounter including Patient and family education, Counseling / Coordination of care, Documenting in the medical record, Reviewing/placing orders in the medical record (including tests, medications, and/or procedures), Obtaining or reviewing history  , and Communicating with other healthcare professionals .    **Please Note: This note may have been constructed using a voice recognition system.**

## 2025-06-29 NOTE — ASSESSMENT & PLAN NOTE
Lab Results   Component Value Date    EGFR 82 06/29/2025    EGFR 86 06/28/2025    EGFR 84 06/27/2025    CREATININE 0.85 06/29/2025    CREATININE 0.76 06/28/2025    CREATININE 0.82 06/27/2025     POA with creatinine 1.49  Likely prerenal in setting of shock state which is now resolved  Creatinine currently at baseline, euvolemic on exam  Avoid nephrotoxins and hypotension  BMP a.m.

## 2025-06-29 NOTE — ASSESSMENT & PLAN NOTE
Baseline hemoglobin appears to be 8.5-9.5  Received 1 UPRBC 6/18 and 6/23  Hemoglobin better than baseline today  CBC a.m.

## 2025-06-29 NOTE — ASSESSMENT & PLAN NOTE
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 No definite MRI evidence of osteomyelitis. The ulcer is also in close approximation to the distal third metatarsal, and early osteomyelitis in that location cannot be excluded. No evidence of an abscess  S/p R TMA 6/23/2025  Wound culture with MSSA, Pseudomonas and Serratia  ID following, discontinued vancomycin and Zosyn 6/26 and started cefepime.    TTE without obvious signs of vegetation  6/26 follow-up blood cultures x 2 results negative after 48 hours.  ID feels patient will need long-term antibiotics and will need a line placed after clearance of these cultures  Case management continues to follow.  Rehab referrals have been made.

## 2025-06-29 NOTE — ASSESSMENT & PLAN NOTE
Lab Results   Component Value Date    HGBA1C 6.6 (H) 06/18/2025       Recent Labs     06/28/25  1642 06/28/25  2108 06/29/25  0735 06/29/25  1112   POCGLU 309* 261* 169* 179*       Blood Sugar Average: Last 72 hrs:  (P) [id number]  Hold prehospital metformin and glipizide while inpatient resume on discharge  Blood sugar results reviewed  Continue sliding scale insulin coverage ACHS  Continue 5 units of Lantus at bedtime  Continue 2 units of Humalog coverage with meals  CHO diet  Hypoglycemia protocol  Continue to monitor blood sugars and adjust regimen accordingly  BMP a.m.

## 2025-06-29 NOTE — ASSESSMENT & PLAN NOTE
Appreciate ID who are following  1/2 blood culture on admission with MSSA bacteremia  Patient was noted to have MSSA and soft tissue culture with MSSA, Pseudomonas and Serratia  TTE without signs of obvious vegetation  Appreciate ID who are following   6/26 IV Zosyn and vancomycin were discontinued by ID.  Patient was initiated on cefepime 2 g IV every 12 hours dosed for renal function-will continue  6/26 blood cultures x 2 negative after 48 hours  ID anticipates prolonged IV antibiotic treatment after blood cultures cleared in setting of right foot osteomyelitis.  Anticipate eventual PICC placement.  Patient remains afebrile, no leukocytosis

## 2025-06-29 NOTE — ASSESSMENT & PLAN NOTE
Noted on MRI imaging  Patient follows with podiatry outpatient for chronic wounds, podiatry continues to follow inpatient  1 of 2 blood cultures on admission with MSSA  S/p right TMA on 6/23/25  Operative cultures with MSSA, Pseudomonas and Serratia  Appreciate ID who are following-vancomycin and Zosyn discontinued 6/26, initiated cefepime   6/26 cultures x 2 negative after 48 hours  See plan for MSSA bacteremia  Dressings per podiatry

## 2025-06-30 ENCOUNTER — APPOINTMENT (INPATIENT)
Dept: INTERVENTIONAL RADIOLOGY/VASCULAR | Facility: HOSPITAL | Age: 80
DRG: 853 | End: 2025-06-30
Payer: COMMERCIAL

## 2025-06-30 LAB
ANION GAP SERPL CALCULATED.3IONS-SCNC: 5 MMOL/L (ref 4–13)
BASOPHILS # BLD AUTO: 0.06 THOUSANDS/ÂΜL (ref 0–0.1)
BASOPHILS NFR BLD AUTO: 1 % (ref 0–1)
BUN SERPL-MCNC: 24 MG/DL (ref 5–25)
CALCIUM SERPL-MCNC: 9.3 MG/DL (ref 8.4–10.2)
CHLORIDE SERPL-SCNC: 102 MMOL/L (ref 96–108)
CO2 SERPL-SCNC: 25 MMOL/L (ref 21–32)
CREAT SERPL-MCNC: 0.81 MG/DL (ref 0.6–1.3)
EOSINOPHIL # BLD AUTO: 0.19 THOUSAND/ÂΜL (ref 0–0.61)
EOSINOPHIL NFR BLD AUTO: 3 % (ref 0–6)
ERYTHROCYTE [DISTWIDTH] IN BLOOD BY AUTOMATED COUNT: 15.3 % (ref 11.6–15.1)
GFR SERPL CREATININE-BSD FRML MDRD: 84 ML/MIN/1.73SQ M
GLUCOSE SERPL-MCNC: 164 MG/DL (ref 65–140)
GLUCOSE SERPL-MCNC: 189 MG/DL (ref 65–140)
GLUCOSE SERPL-MCNC: 203 MG/DL (ref 65–140)
GLUCOSE SERPL-MCNC: 226 MG/DL (ref 65–140)
GLUCOSE SERPL-MCNC: 264 MG/DL (ref 65–140)
HCT VFR BLD AUTO: 32.1 % (ref 36.5–49.3)
HGB BLD-MCNC: 9.8 G/DL (ref 12–17)
IMM GRANULOCYTES # BLD AUTO: 0.07 THOUSAND/UL (ref 0–0.2)
IMM GRANULOCYTES NFR BLD AUTO: 1 % (ref 0–2)
LYMPHOCYTES # BLD AUTO: 1.75 THOUSANDS/ÂΜL (ref 0.6–4.47)
LYMPHOCYTES NFR BLD AUTO: 26 % (ref 14–44)
MCH RBC QN AUTO: 27 PG (ref 26.8–34.3)
MCHC RBC AUTO-ENTMCNC: 30.5 G/DL (ref 31.4–37.4)
MCV RBC AUTO: 88 FL (ref 82–98)
MONOCYTES # BLD AUTO: 0.67 THOUSAND/ÂΜL (ref 0.17–1.22)
MONOCYTES NFR BLD AUTO: 10 % (ref 4–12)
NEUTROPHILS # BLD AUTO: 4.08 THOUSANDS/ÂΜL (ref 1.85–7.62)
NEUTS SEG NFR BLD AUTO: 59 % (ref 43–75)
NRBC BLD AUTO-RTO: 0 /100 WBCS
PLATELET # BLD AUTO: 378 THOUSANDS/UL (ref 149–390)
PMV BLD AUTO: 9.3 FL (ref 8.9–12.7)
POTASSIUM SERPL-SCNC: 4.7 MMOL/L (ref 3.5–5.3)
RBC # BLD AUTO: 3.63 MILLION/UL (ref 3.88–5.62)
SODIUM SERPL-SCNC: 132 MMOL/L (ref 135–147)
VANCOMYCIN TROUGH SERPL-MCNC: 3.7 UG/ML (ref 10–20)
WBC # BLD AUTO: 6.82 THOUSAND/UL (ref 4.31–10.16)

## 2025-06-30 PROCEDURE — NC001 PR NO CHARGE: Performed by: RADIOLOGY

## 2025-06-30 PROCEDURE — 85025 COMPLETE CBC W/AUTO DIFF WBC: CPT

## 2025-06-30 PROCEDURE — 97110 THERAPEUTIC EXERCISES: CPT

## 2025-06-30 PROCEDURE — 97530 THERAPEUTIC ACTIVITIES: CPT

## 2025-06-30 PROCEDURE — 02HV33Z INSERTION OF INFUSION DEVICE INTO SUPERIOR VENA CAVA, PERCUTANEOUS APPROACH: ICD-10-PCS | Performed by: RADIOLOGY

## 2025-06-30 PROCEDURE — 36573 INSJ PICC RS&I 5 YR+: CPT | Performed by: RADIOLOGY

## 2025-06-30 PROCEDURE — 99232 SBSQ HOSP IP/OBS MODERATE 35: CPT | Performed by: INTERNAL MEDICINE

## 2025-06-30 PROCEDURE — 36569 INSJ PICC 5 YR+ W/O IMAGING: CPT

## 2025-06-30 PROCEDURE — 82948 REAGENT STRIP/BLOOD GLUCOSE: CPT

## 2025-06-30 PROCEDURE — 80202 ASSAY OF VANCOMYCIN: CPT

## 2025-06-30 PROCEDURE — 97535 SELF CARE MNGMENT TRAINING: CPT

## 2025-06-30 PROCEDURE — C1751 CATH, INF, PER/CENT/MIDLINE: HCPCS

## 2025-06-30 PROCEDURE — 87081 CULTURE SCREEN ONLY: CPT

## 2025-06-30 PROCEDURE — 80048 BASIC METABOLIC PNL TOTAL CA: CPT

## 2025-06-30 PROCEDURE — 99232 SBSQ HOSP IP/OBS MODERATE 35: CPT

## 2025-06-30 PROCEDURE — 97116 GAIT TRAINING THERAPY: CPT

## 2025-06-30 RX ORDER — LIDOCAINE HYDROCHLORIDE 10 MG/ML
INJECTION, SOLUTION EPIDURAL; INFILTRATION; INTRACAUDAL; PERINEURAL AS NEEDED
Status: COMPLETED | OUTPATIENT
Start: 2025-06-30 | End: 2025-06-30

## 2025-06-30 RX ORDER — INSULIN LISPRO 100 [IU]/ML
3 INJECTION, SOLUTION INTRAVENOUS; SUBCUTANEOUS
Status: DISCONTINUED | OUTPATIENT
Start: 2025-06-30 | End: 2025-07-02

## 2025-06-30 RX ADMIN — INSULIN LISPRO 2 UNITS: 100 INJECTION, SOLUTION INTRAVENOUS; SUBCUTANEOUS at 08:53

## 2025-06-30 RX ADMIN — FAMOTIDINE 20 MG: 20 TABLET, FILM COATED ORAL at 08:48

## 2025-06-30 RX ADMIN — INSULIN LISPRO 3 UNITS: 100 INJECTION, SOLUTION INTRAVENOUS; SUBCUTANEOUS at 12:31

## 2025-06-30 RX ADMIN — BUDESONIDE, GLYCOPYRROLATE, AND FORMOTEROL FUMARATE 2 PUFF: 160; 9; 4.8 AEROSOL, METERED RESPIRATORY (INHALATION) at 08:49

## 2025-06-30 RX ADMIN — INSULIN LISPRO 1 UNITS: 100 INJECTION, SOLUTION INTRAVENOUS; SUBCUTANEOUS at 08:53

## 2025-06-30 RX ADMIN — CEFEPIME 2000 MG: 2 INJECTION, POWDER, FOR SOLUTION INTRAVENOUS at 23:58

## 2025-06-30 RX ADMIN — CLOPIDOGREL 75 MG: 75 TABLET ORAL at 08:48

## 2025-06-30 RX ADMIN — MICONAZOLE NITRATE: 20 CREAM TOPICAL at 21:56

## 2025-06-30 RX ADMIN — MICONAZOLE NITRATE: 20 CREAM TOPICAL at 10:53

## 2025-06-30 RX ADMIN — INSULIN LISPRO 2 UNITS: 100 INJECTION, SOLUTION INTRAVENOUS; SUBCUTANEOUS at 17:29

## 2025-06-30 RX ADMIN — BUDESONIDE, GLYCOPYRROLATE, AND FORMOTEROL FUMARATE 2 PUFF: 160; 9; 4.8 AEROSOL, METERED RESPIRATORY (INHALATION) at 21:56

## 2025-06-30 RX ADMIN — INSULIN LISPRO 3 UNITS: 100 INJECTION, SOLUTION INTRAVENOUS; SUBCUTANEOUS at 17:28

## 2025-06-30 RX ADMIN — INSULIN LISPRO 2 UNITS: 100 INJECTION, SOLUTION INTRAVENOUS; SUBCUTANEOUS at 21:56

## 2025-06-30 RX ADMIN — Medication 1 TABLET: at 08:49

## 2025-06-30 RX ADMIN — ATENOLOL 25 MG: 25 TABLET ORAL at 08:48

## 2025-06-30 RX ADMIN — CEFEPIME 2000 MG: 2 INJECTION, POWDER, FOR SOLUTION INTRAVENOUS at 00:15

## 2025-06-30 RX ADMIN — INSULIN LISPRO 1 UNITS: 100 INJECTION, SOLUTION INTRAVENOUS; SUBCUTANEOUS at 12:30

## 2025-06-30 RX ADMIN — CEFEPIME 2000 MG: 2 INJECTION, POWDER, FOR SOLUTION INTRAVENOUS at 12:30

## 2025-06-30 RX ADMIN — ATORVASTATIN CALCIUM 40 MG: 40 TABLET, FILM COATED ORAL at 17:28

## 2025-06-30 RX ADMIN — MONTELUKAST 10 MG: 10 TABLET, FILM COATED ORAL at 21:56

## 2025-06-30 RX ADMIN — PREDNISONE 5 MG: 5 TABLET ORAL at 08:48

## 2025-06-30 RX ADMIN — INSULIN GLARGINE 10 UNITS: 100 INJECTION, SOLUTION SUBCUTANEOUS at 21:56

## 2025-06-30 RX ADMIN — APIXABAN 5 MG: 5 TABLET, FILM COATED ORAL at 08:49

## 2025-06-30 RX ADMIN — APIXABAN 5 MG: 5 TABLET, FILM COATED ORAL at 17:28

## 2025-06-30 RX ADMIN — LIDOCAINE HYDROCHLORIDE 10 ML: 10 INJECTION, SOLUTION EPIDURAL; INFILTRATION; INTRACAUDAL; PERINEURAL at 15:40

## 2025-06-30 RX ADMIN — LISINOPRIL 5 MG: 5 TABLET ORAL at 08:48

## 2025-06-30 NOTE — ASSESSMENT & PLAN NOTE
Appreciate ID who are following  1/2 blood culture on admission with MSSA bacteremia  Patient was noted to have MSSA and soft tissue culture with MSSA, Pseudomonas and Serratia  TTE without signs of obvious vegetation  Appreciate ID who are following   6/26 IV Zosyn and vancomycin were discontinued by ID.  Patient was initiated on cefepime 2 g IV every 12 hours dosed for renal function-will continue  6/26 blood cultures x 2 negative after 72 hours.  Cleared for PICC line ID.  IR will place PICC line today.  Plan will be 6 weeks of IV antibiotics per ID  Patient remains afebrile, no leukocytosis

## 2025-06-30 NOTE — PROGRESS NOTES
Progress Note - Infectious Disease   Name: Gold Van 79 y.o. male I MRN: [id number]  Unit/Bed#: -01 I Date of Admission: 6/17/2025   Date of Service: 6/30/2025 I Hospital Day: 13     Administrative Statements   VIRTUAL CARE DOCUMENTATION:     1. This service was provided via Telemedicine using Pet[employer] Kit     2. Parties in the room with patient during teleconsult Patient only    3. Confidentiality My office door was closed     4. Participants No one else was in the room    5. Patient acknowledged consent and understanding of privacy and security of the  Telemedicine consult. I informed the patient that I have reviewed their record in Epic and presented the opportunity for them to ask any questions regarding the visit today.  The patient agreed to participate.    6. I have spent a total time of 30 minutes in caring for this patient on the day of the visit/encounter including Diagnostic results, Patient and family education, Counseling / Coordination of care, Documenting in the medical record, Reviewing/placing orders in the medical record (including tests, medications, and/or procedures), Obtaining or reviewing history  , and Communicating with other healthcare professionals , not including the time spent for establishing the audio/video connection.     Assessment & Plan  Septic shock (HCC)  Leukocytosis, tachycardia, tachycardia, lactic acidosis, and hypotension requiring temporary pressor support. 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 No other clear source appreciated. The patient has clinically improved and no longer requires pressor support. He has transitioned out of ICU to med-surg care. Patient's remained afebrile. His WBC count has trended down. Repeat blood cultures are negative >72 hours.   -antibiotic as below  -check CBCD and BMP tomorrow  -follow up repeat blood cultures  -monitor vitals  -supportive care  MSSA bacteremia  Likely secondary to R foot osteomyelitis. Patient's  admission blood cultures with growth of MSSA in one. MSSA also present in R foot wound culture. He has no implanted devices or surgical hardware. TTE was a technically difficulty study but showed no valvular vegetations. Will not pursue DENISE at this time given clear source and given that patient will require prolonged antibiotic for foot treatment anyway. Patient has been transition to IV Cefepime for coverage of both his bacteremia and foot infection. He is tolerating antibiotic without difficulty. Repeat blood cultures are negative >72 hours.    -continue IV cefepime, 2g q12, dosed for renal function, through 8/6/2025 for 6 weeks of IV antibiotic after cleared blood cultures and in setting of R foot osteomyelitis  -weekly CBCD and creatinine while on IV antibiotic  -follow up repeat blood cultures  -monitor vitals  -patient is okay for PICC placement  -PICC to be removed by RN after final dose of IV antibiotic on 8/6/2025  -patient to follow up in the ID office after discharge, appointment information is in the discharge planning  Osteomyelitis of right foot (HCC)  Patient follows chronically without podiatry/wound management. 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 Now with MRI of the R foot showing soft tissue ulcer overlying the area of 4-5th ray surgery without underlying abscess, and marrow edema of the residual 4th and 5th metatarsals as well as the 3rd metatarsal head and 3rd proximal phalanx. Podiatry performed R TMA on 6/23/2025. Operative cultures with growth of MSSA, pseudomonas, and serratia. Surgical cure was not felt to be achieved.  -antibiotic as above  -serial R foot exams  -local care/VAC care per podiatry  -continue follow up with podiatry   Type 2 diabetes mellitus with complication, without long-term current use of insulin ([facility])  Lab Results   Component Value Date    HGBA1C 6.6 (H) 06/18/2025   Elevated blood glucose is risk factor for wounds and infection.   -recommend tight  glycemic control  -blood glucose management per primary service  Acute renal failure superimposed on stage 2 chronic kidney disease  (HCC)  Lab Results   Component Value Date    EGFR 84 2025    EGFR 82 2025    EGFR 86 2025    CREATININE 0.81 2025    CREATININE 0.85 2025    CREATININE 0.76 2025   This impacts antibiotic dosing. Upon review of patient's medical records it appears his baseline creatinine is approximately 0.7-0.9. Creatinine was 1.49 upon admission. Creatinine is now improved, was 0.81 this morning.  -monitor creatinine  -dose adjust antibiotic for renal function as needed  -avoid nephrotoxins  -volume management per primary service   Allergy to antibiotic  Patient reports SOB with ciprofloxacin and Bactrim. We will avoid these antibiotics for now.  -monitor patient for adverse medication reactions     Above plan was discussed in detail with patient over the TV kit.  Above plan was discussed in detail with SRI who agrees with plan for ongoing IV antibiotic treatment.     Subjective:  Patient reports he's doing well today. He has no fever, chills, sweats, shakes; no nausea, vomiting, abdominal pain, diarrhea, or dysuria; no cough, shortness of breath, or chest pain. No new R foot pain. No new symptoms.    Antibiotics:  Cefepime 5  Antibiotics 14    Physical Exam     Temp:  [98.1 °F (36.7 °C)-98.3 °F (36.8 °C)] 98.1 °F (36.7 °C)  HR:  [74-86] 74  Resp:  [16-20] 16  BP: (118-143)/(56-76) 136/64  SpO2:  [94 %-99 %] 99 %  Temp (24hrs), Av.2 °F (36.8 °C), Min:98.1 °F (36.7 °C), Max:98.3 °F (36.8 °C)  Current: Temperature: 98.1 °F (36.7 °C)    Intake/Output Summary (Last 24 hours) at 2025 0659  Last data filed at 2025 0443  Gross per 24 hour   Intake --   Output 1250 ml   Net -1250 ml       Physical exam findings reported by bedside and primary medical team staff, also observed on TV kit:  General Appearance:  Alert, interactive, nontoxic, no acute distress.  He appears comfortable semi-supine in bed. He is wearing his glasses.   Throat: Oropharynx moist without lesions.    Lungs:   Clear to auscultation bilaterally; no wheezes, rhonchi or rales; respirations unlabored on room air.   Heart:  RRR; no murmur, rub or gallop.   Abdomen:   Soft, non-tender, non-distended.   Extremities: R foot dressing intact.   Skin: No new rashes noted on exposed skin.     Invasive Devices:   Peripheral IV 06/26/25 Left;Ventral (anterior) Forearm (Active)   Site Assessment WDL;Clean;Dry;Intact 06/28/25 1200   Dressing Type Transparent 06/28/25 1200   Line Status Positional;Flushed & Clamped 06/28/25 1200   Dressing Status Intact 06/28/25 1200   Dressing Change Due 06/30/25 06/28/25 1200   Reason Not Rotated Not due 06/28/25 1200       Peripheral IV 06/28/25 Right;Ventral (anterior) Forearm (Active)   Site Assessment WDL;Clean;Dry;Intact 06/28/25 1200   Dressing Type Transparent 06/28/25 1200   Line Status Flushed;Blood return noted;Saline locked 06/28/25 1200   Dressing Status Clean;Dry;Intact 06/28/25 1200   Dressing Change Due 07/02/25 06/28/25 1200   Reason Not Rotated Not due 06/28/25 1200       Labs, Imaging, & Other Studies   I have personally reviewed pertinent labs.    Results from last 7 days   Lab Units 06/30/25  0452 06/29/25  0433 06/28/25  0516   WBC Thousand/uL 6.82 5.86 6.42   HEMOGLOBIN g/dL 9.8* 10.3* 9.7*   PLATELETS Thousands/uL 378 382 346     Results from last 7 days   Lab Units 06/30/25  0452   POTASSIUM mmol/L 4.7   CHLORIDE mmol/L 102   CO2 mmol/L 25   BUN mg/dL 24   CREATININE mg/dL 0.81   EGFR ml/min/1.73sq m 84   CALCIUM mg/dL 9.3     Results from last 7 days   Lab Units 06/26/25  1123 06/23/25  1454   BLOOD CULTURE  No Growth at 72 hrs.  No Growth at 72 hrs.  --    GRAM STAIN RESULT   --  No Polys or Bacteria seen  No Polys or Bacteria seen   WOUND CULTURE   --  3 colonies Serratia marcescens*  3 colonies Pseudomonas aeruginosa*  2 colonies Staphylococcus  aureus*  2 colonies  2+ Growth of Staphylococcus aureus*

## 2025-06-30 NOTE — ASSESSMENT & PLAN NOTE
Leukocytosis, tachycardia, tachycardia, lactic acidosis, and hypotension requiring temporary pressor support. 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 No other clear source appreciated. The patient has clinically improved and no longer requires pressor support. He has transitioned out of ICU to [facility] care. Patient's remained afebrile. His WBC count has trended down. Repeat blood cultures are negative >72 hours.   -antibiotic as below  -check CBCD and BMP tomorrow  -follow up repeat blood cultures  -monitor vitals  -supportive care

## 2025-06-30 NOTE — ASSESSMENT & PLAN NOTE
Lab Results   Component Value Date    EGFR 84 06/30/2025    EGFR 82 06/29/2025    EGFR 86 06/28/2025    CREATININE 0.81 06/30/2025    CREATININE 0.85 06/29/2025    CREATININE 0.76 06/28/2025   This impacts antibiotic dosing. Upon review of patient's medical records it appears his baseline creatinine is approximately 0.7-0.9. Creatinine was 1.49 upon admission. Creatinine is now improved, was 0.81 this morning.  -monitor creatinine  -dose adjust antibiotic for renal function as needed  -avoid nephrotoxins  -volume management per primary service

## 2025-06-30 NOTE — ASSESSMENT & PLAN NOTE
Lab Results   Component Value Date    HGBA1C 6.6 (H) 06/18/2025       Recent Labs     06/29/25  1630 06/29/25 2018 06/30/25  0721 06/30/25  1116   POCGLU 242* 226* 164* 203*       Blood Sugar Average: Last 72 hrs:  (P) [id number]  Hold prehospital metformin and glipizide while inpatient resume on discharge  Blood sugar results reviewed  Continue sliding scale insulin coverage ACHS  Continue 5 units of Lantus at bedtime  Increase Humalog from 2 units to 3 units with meals  CHO diet  Hypoglycemia protocol  Continue to monitor blood sugars and adjust regimen accordingly  BMP a.m.

## 2025-06-30 NOTE — ASSESSMENT & PLAN NOTE
Likely secondary to R foot osteomyelitis. Patient's admission blood cultures with growth of MSSA in one. MSSA also present in R foot wound culture. He has no implanted devices or surgical hardware. TTE was a technically difficulty study but showed no valvular vegetations. Will not pursue DENISE at this time given clear source and given that patient will require prolonged antibiotic for foot treatment anyway. Patient has been transition to IV Cefepime for coverage of both his bacteremia and foot infection. He is tolerating antibiotic without difficulty. Repeat blood cultures are negative >72 hours.    -continue IV cefepime, 2g q12, dosed for renal function, through 8/6/2025 for 6 weeks of IV antibiotic after cleared blood cultures and in setting of R foot osteomyelitis  -weekly CBCD and creatinine while on IV antibiotic  -follow up repeat blood cultures  -monitor vitals  -patient is okay for PICC placement  -PICC to be removed by RN after final dose of IV antibiotic on 8/6/2025  -patient to follow up in the ID office after discharge, appointment information is in the discharge planning

## 2025-06-30 NOTE — OCCUPATIONAL THERAPY NOTE
"   06/30/25 0925   OT Last Visit   OT Visit Date 06/30/25   Note Type   Note Type Treatment   Pain Assessment   Pain Assessment Tool 0-10   Pain Score   (reports \"some\" pain at top of butt crack - \"a sore\")   Restrictions/Precautions   Weight Bearing Precautions Per Order Yes   RLE Weight Bearing Per Order NWB   Other Precautions Chair Alarm;Bed Alarm;WBS;Fall Risk;Pain;Multiple lines   Lifestyle   Intrinsic Gratification just wants to get home with my wife , \"she's been dealing with this for 2 months\" > visiting him, etc.   ADL   Where Assessed Other (Comment)  (seated in bed (back supported))   Equipment Provided   (sock aid;  reacher; long shoehorn)   Grooming Comments reports that he will do his denture care this afternoon when his wife comes in   UB Bathing Assistance 4  Minimal Assistance   UB Bathing Deficit Setup;Verbal cueing;Supervision/safety;Increased time to complete   LB Bathing Assistance 4  Minimal Assistance   LB Bathing Deficit Setup;Verbal cueing;Supervision/safety;Increased time to complete;Buttocks;Left lower leg including foot   UB Dressing Comments *minimal assist. with unfamiliar aspects of hospital gown change   LB Dressing Comments not tested this session   Bed Mobility   Rolling R 6  Modified independent   Additional items Bedrails   Rolling L 6  Modified independent   Additional items Bedrails   Additional Comments patient also able to utilize handles at head of bed to ASSIST with moving body toward head of bed   Therapeutic Excerise-Strength   UE Strength Yes   Right Upper Extremity- Strength   R Shoulder Flexion;Extension;Horizontal ABduction;Other (Comment)  (horizontal adduction ; pro/re-traction)   R Elbow Elbow flexion;Elbow extension  (in forward And reverse;  Also: supin/pron-ation)   R Weight/Reps/Sets 5 pound weight - 10 reps shoulders ; 15 reps elbows   Left Upper Extremity-Strength   L Weights/Reps/Sets all exers. same as RUE above   LUE Strength Comment *patient has some " familiarity with UE exercise from prev. therapy and gym experiences  (has a 5 pound weight (from home) at bedside which he has occasionally been exercising with)   Coordination   Gross Motor WFL   Dexterity appears WFL   Subjective   Subjective patient became slightly tearful X 2 during session when speaking of his (good) relationship with his wife   Cognition   Overall Cognitive Status WFL   Arousal/Participation Alert;Responsive;Cooperative   Attention Within functional limits   Orientation Level Oriented X4   Following Commands Follows one step commands without difficulty   Activity Tolerance   Activity Tolerance Patient tolerated treatment well   Medical Staff Made Aware nurse Tawanna aware of session's outcome   Assessment   Assessment Patient participated in Skilled OT session this date with interventions consisting of ADL re training with the use of correct body mechnaics, therapeutic exercise to: increase functional use of BUEs, increase BUE muscle strength , and  therapeutic activities to: increase activity tolerance . Patient agreeable to OT treatment session, upon arrival patient was found seated in bed (back supported).  In comparison to previous session, patient with improvements in self-care accomplishment and activity tolerance.   Patient requiring verbal cues for correct technique and ocassional safety reminders, occasional rest periods, occasional verbal cues for correct technique, and self-care assistance as noted in flow sheet/AM-PAC.  Patient continues to be functioning below baseline level, occupational performance remains limited secondary to factors listed above and increased risk for falls and injury.   Patient to benefit from continued Occupational Therapy treatment while in the hospital to address deficits as defined above and maximize level of functional independence with ADLs and functional mobility.   Plan   Treatment Interventions ADL retraining;UE strengthening/ROM;Patient/family  training;Compensatory technique education;Activityengagement;Equipment evaluation/education   Goal Expiration Date 07/04/25   OT Treatment Day 1   Discharge Recommendation   Rehab Resource Intensity Level, OT I (Maximum Resource Intensity)   Additional Comments 2 The patient's raw score on the AM-PAC Daily Activity Inpatient Short Form is 18. A raw score of less than 19 suggests the patient may benefit from discharge to post-acute rehabilitation services. Please refer to the recommendation of the Occupational Therapist for safe discharge planning.   AM-PAC Daily Activity Inpatient   Lower Body Dressing 2   Bathing 3   Toileting 2   Upper Body Dressing 3   Grooming 4   Eating 4   Daily Activity Raw Score 18   Daily Activity Standardized Score (Calc for Raw Score >=11) 38.66   AM-PAC Applied Cognition Inpatient   Following a Speech/Presentation 3   Understanding Ordinary Conversation 4   Taking Medications 4   Remembering Where Things Are Placed or Put Away 3   Remembering List of 4-5 Errands 3   Taking Care of Complicated Tasks 3   Applied Cognition Raw Score 20   Applied Cognition Standardized Score 41.76       ORION Smith/STEPHANIE

## 2025-06-30 NOTE — ASSESSMENT & PLAN NOTE
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 No definite MRI evidence of osteomyelitis. The ulcer is also in close approximation to the distal third metatarsal, and early osteomyelitis in that location cannot be excluded. No evidence of an abscess  S/p R TMA 6/23/2025  Wound culture with MSSA, Pseudomonas and Serratia  ID following, discontinued vancomycin and Zosyn 6/26 and started cefepime.    TTE without obvious signs of vegetation  6/26 follow-up blood cultures x 2 results negative after 72 hours.  Patient cleared for PICC line placement today by ID.  Placed consult to IR for PICC line today.  Plan is for 6 weeks of IV antibiotics on discharge  Case management following for discharge planning-will need rehab on discharge

## 2025-06-30 NOTE — DISCHARGE INSTR - AVS FIRST PAGE
PICC TO BE REMOVED BY FACILITY RN AFTER FINAL DOSE OF IV ANTIBIOTIC ON 8/6/2025.  Weekly CBCD and creatinine while on IV antibiotic.  You will be set up with an appointment in the outpatient Infectious Disease office. It is important for you to keep this appointment in order for us to monitor you while you are on IV antibiotics.  This will include evaluating your lab work, examining your PICC line, and making sure you are not having toxicities or side effects of the medication(s) you are receiving.   ------------------------------------------------------------------------------------------------------------      Discharge Instructions - Podiatry    Weight Bearing Status: Strict nonweightbearing to surgical foot          Pain: Continue analgesics as directed    Follow-up appointment instructions: Please make an appointment within one week of discharge with Dr. Galeas. Contact sooner if any increase in pain, or signs of infection occur    Wound Care: Leave dressings clean, dry, and intact between professional dressing changes    Nursing Instructions: Please apply Adaptic. Then cover with Gauze and secure with Kerlix and tape. Please change dressing every Monday, Wednesday, and Friday .

## 2025-06-30 NOTE — PLAN OF CARE
Problem: INFECTION - ADULT  Goal: Absence or prevention of progression during hospitalization  Description: INTERVENTIONS:  - Assess and monitor for signs and symptoms of infection  - Monitor lab/diagnostic results  - Monitor all insertion sites, i.e. indwelling lines, tubes, and drains  - Monitor endotracheal if appropriate and nasal secretions for changes in amount and color  - [place] appropriate cooling/warming therapies per order  - Administer medications as ordered  - Instruct and encourage patient and family to use good hand hygiene technique  - Identify and instruct in appropriate isolation precautions for identified infection/condition  Outcome: Progressing  Goal: Absence of fever/infection during neutropenic period  Description: INTERVENTIONS:  - Monitor WBC  - Perform strict hand hygiene  - Limit to healthy visitors only  - No plants, dried, fresh or silk flowers with otoole in patient room  Outcome: Progressing

## 2025-06-30 NOTE — PLAN OF CARE
Problem: OCCUPATIONAL THERAPY ADULT  Goal: Performs self-care activities at highest level of function for planned discharge setting.  See evaluation for individualized goals.  Description: Treatment Interventions: ADL retraining, Functional transfer training, UE strengthening/ROM, Endurance training, Patient/family training, Compensatory technique education, Energy conservation, Activityengagement          See flowsheet documentation for full assessment, interventions and recommendations.   Outcome: Progressing, gradually   Note: Limitation: Decreased ADL status, Decreased UE strength, Decreased Safe judgement during ADL, Decreased endurance, Decreased self-care trans, Decreased high-level ADLs  Prognosis: Good  Assessment: Patient participated in Skilled OT session this date with interventions consisting of ADL re training with the use of correct body mechnaics, therapeutic exercise to: increase functional use of BUEs, increase BUE muscle strength , and  therapeutic activities to: increase activity tolerance . Patient agreeable to OT treatment session, upon arrival patient was found seated in bed (back supported).  In comparison to previous session, patient with improvements in self-care accomplishment and activity tolerance.   Patient requiring verbal cues for correct technique and ocassional safety reminders, occasional rest periods, occasional verbal cues for correct technique, and self-care assistance as noted in flow sheet/AM-PAC.  Patient continues to be functioning below baseline level, occupational performance remains limited secondary to factors listed above and increased risk for falls and injury.   Patient to benefit from continued Occupational Therapy treatment while in the hospital to address deficits as defined above and maximize level of functional independence with ADLs and functional mobility.     Rehab Resource Intensity Level, OT: I (Maximum Resource Intensity)     NEERU Smith

## 2025-06-30 NOTE — PROGRESS NOTES
Progress Note - Hospitalist   Name: Gold Van 79 y.o. male I MRN: [id number]  Unit/Bed#: -01 I Date of Admission: 6/17/2025   Date of Service: 6/30/2025 I Hospital Day: 13    Assessment & Plan  Septic shock (HCC)  POA, shock criteria have since resolved  Source: RLE osteo  1 of 2 blood cultures with MSSA  Hx serratia/pseudomonas/e faecalis wound infection in past  MRI R foot-Postsurgical changes of partial fourth and fifth ray resections with a soft tissue ulcer overlying the cut surfaces of the fourth and fifth metatarsals and findings concerning for early osteomyelitis in the residual fourth metatarsal. No definite MRI evidence of osteomyelitis. The ulcer is also in close approximation to the distal third metatarsal, and early osteomyelitis in that location cannot be excluded. No evidence of an abscess  S/p R TMA 6/23/2025  Wound culture with MSSA, Pseudomonas and Serratia  ID following, discontinued vancomycin and Zosyn 6/26 and started cefepime.    TTE without obvious signs of vegetation  6/26 follow-up blood cultures x 2 results negative after 72 hours.  Patient cleared for PICC line placement today by ID.  Placed consult to IR for PICC line today.  Plan is for 6 weeks of IV antibiotics on discharge  Case management following for discharge planning-will need rehab on discharge  Acute renal failure superimposed on stage 2 chronic kidney disease  ([facility])  Lab Results   Component Value Date    EGFR 84 06/30/2025    EGFR 82 06/29/2025    EGFR 86 06/28/2025    CREATININE 0.81 06/30/2025    CREATININE 0.85 06/29/2025    CREATININE 0.76 06/28/2025     POA with creatinine 1.49  Likely prerenal in setting of shock state which is now resolved  Creatinine currently at baseline, euvolemic on exam  Avoid nephrotoxins and hypotension  GERDA resolved  Type 2 diabetes mellitus with complication, without long-term current use of insulin ([facility])  Lab Results   Component Value Date    HGBA1C 6.6 (H) 06/18/2025       Recent  Labs     06/29/25  1630 06/29/25  2018 06/30/25  0721 06/30/25  1116   POCGLU 242* 226* 164* 203*       Blood Sugar Average: Last 72 hrs:  (P) [id number]  Hold prehospital metformin and glipizide while inpatient resume on discharge  Blood sugar results reviewed  Continue sliding scale insulin coverage ACHS  Continue 5 units of Lantus at bedtime  Increase Humalog from 2 units to 3 units with meals  CHO diet  Hypoglycemia protocol  Continue to monitor blood sugars and adjust regimen accordingly  BMP a.m.  Essential hypertension  Shock state resolved  Continue lisinopril, atenolol  Monitor BP per protocol  CVA (cerebrovascular accident) ([facility])  No residual deficits  Continue home statin and Plavix  PAF (paroxysmal atrial fibrillation) ([facility])  Resumed prehospital atenolol 6/25,  shock state resolved  Had episodes of atrial fibrillation with RVR 6/21 treated with 3 doses of metoprolol IV followed by Cardizem.  No further episodes  Continue prehospital Eliquis  Mixed hyperlipidemia  Continue home statin  Hyponatremia  Chronically 130-134  Currently at baseline  Euvolemic on exam  Continue to trend, BMP a.m.  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  Pressure injury of skin of sacral region  Continue wound care per protocol  Ambulatory dysfunction  RLE NWB  PT OT-recommend rehab placement  Case management is making rehab [id number] blood cultures have cleared.  Patient will have PICC line placed.  IR consulted.  ID continues to follow.  Plan is for 6 weeks of IV antibiotics on discharge for ID.  Patient is hoping that [facility] will accept  him on discharge  Moderate protein-calorie malnutrition (HCC)  Malnutrition Findings:        Nutrition following  Monitor appetites                       BMI Findings:           Body mass index is 18.58 kg/m².     MSSA bacteremia  Appreciate ID who are following  1/2 blood culture on admission with MSSA bacteremia  Patient was noted to have MSSA and soft tissue culture with MSSA, Pseudomonas and Serratia  TTE without signs of obvious vegetation  Appreciate ID who are following   6/26 IV Zosyn and vancomycin were discontinued by ID.  Patient was initiated on cefepime 2 g IV every 12 hours dosed for renal function-will continue  6/26 blood cultures x 2 negative after 72 hours.  Cleared for PICC line ID.  IR will place PICC line today.  Plan will be 6 weeks of IV antibiotics per ID  Patient remains afebrile, no leukocytosis        Osteomyelitis of right foot (HCC)  Noted on MRI imaging  Patient follows with podiatry outpatient for chronic wounds, podiatry continues to follow inpatient  1 of 2 blood cultures on admission with MSSA  S/p right TMA on 6/23/25  Operative cultures with MSSA, Pseudomonas and Serratia  Appreciate ID who are following-vancomycin and Zosyn discontinued 6/26, initiated cefepime   6/26 cultures x 2 negative after 72 hours  See plan for MSSA bacteremia  Dressings per podiatry  Allergy to antibiotic  Appreciate ID who are following for MSSA bacteremia  Avoid ciprofloxacin and Bactrim as patient reports shortness of breath with these antibiotics    VTE Pharmacologic Prophylaxis: VTE Score: 3 Moderate Risk (Score 3-4) - Pharmacological DVT Prophylaxis Ordered: apixaban (Eliquis).    Mobility:   Basic Mobility Inpatient Raw Score: 14  JH-HLM Goal: 4: Move to chair/commode  JH-HLM Achieved: 5: Stand (1 or more minutes)  JH-HLM Goal achieved. Continue to encourage appropriate mobility.    Patient Centered Rounds: I performed bedside rounds with nursing staff today.   Discussions with Specialists or Other  Care Team Provider:  nursing, case management    Education and Discussions with Family / Patient: Updated  (wife) at bedside.    Current Length of Stay: 13 day(s)  Current Patient Status: Inpatient   Certification Statement: The patient will continue to require additional inpatient hospital stay due to MSSA bacteremia treating with IV antibiotics, ID following, repeat cultures negative at 72 hours.  PICC line will be placed by ID today  Discharge Plan: Patient being treated for MSSA bacteremia ID is following.  Blood cultures have now cleared.  PICC line is being placed.  Patient will require rehab on discharge-case management made referrals.  Code Status: Level 1 - Full Code    Subjective   Denies any dizziness, lightness, chest pain or palpitations.  Pleasant, verbalizes needs.    Objective :  Temp:  [98.1 °F (36.7 °C)-98.2 °F (36.8 °C)] 98.2 °F (36.8 °C)  HR:  [74-76] 76  BP: (136-141)/(63-79) 141/79  Resp:  [16-20] 16  SpO2:  [95 %-99 %] 96 %  O2 Device: None (Room air)    Body mass index is 18.58 kg/m².     Input and Output Summary (last 24 hours):     Intake/Output Summary (Last 24 hours) at 6/30/2025 1531  Last data filed at 6/30/2025 1237  Gross per 24 hour   Intake --   Output 1300 ml   Net -1300 ml       Physical Exam  Vitals reviewed.   Constitutional:       General: He is not in acute distress.     Appearance: He is well-developed.   HENT:      Head: Normocephalic and atraumatic.     Cardiovascular:      Rate and Rhythm: Normal rate and regular rhythm.      Heart sounds: No murmur heard.  Pulmonary:      Effort: Pulmonary effort is normal. No respiratory distress.      Breath sounds: Normal breath sounds. No wheezing or rales.   Abdominal:      General: There is no distension.      Palpations: Abdomen is soft.      Tenderness: There is no abdominal tenderness. There is no guarding.     Musculoskeletal:         General: No swelling.     Skin:     General: Skin is warm and dry.      Capillary  Refill: Capillary refill takes less than 2 seconds.      Findings: Erythema present.      Comments: Sacral erythema   right foot dressing intact     Neurological:      General: No focal deficit present.      Mental Status: He is alert and oriented to person, place, and time. Mental status is at baseline.     Psychiatric:         Mood and Affect: Mood normal.         Behavior: Behavior normal.         Thought Content: Thought content normal.         Judgment: Judgment normal.           Lines/Drains:              Lab Results: I have reviewed the following results:   Results from last 7 days   Lab Units 06/30/25  0452   WBC Thousand/uL 6.82   HEMOGLOBIN g/dL 9.8*   HEMATOCRIT % 32.1*   PLATELETS Thousands/uL 378   SEGS PCT % 59   LYMPHO PCT % 26   MONO PCT % 10   EOS PCT % 3     Results from last 7 days   Lab Units 06/30/25  0452   SODIUM mmol/L 132*   POTASSIUM mmol/L 4.7   CHLORIDE mmol/L 102   CO2 mmol/L 25   BUN mg/dL 24   CREATININE mg/dL 0.81   ANION GAP mmol/L 5   CALCIUM mg/dL 9.3   GLUCOSE RANDOM mg/dL 189*         Results from last 7 days   Lab Units 06/30/25  1116 06/30/25  0721 06/29/25  2018 06/29/25  1630 06/29/25  1112 06/29/25  0735 06/28/25  2108 06/28/25  1642 06/28/25  1111 06/28/25  0707 06/27/25  2050 06/27/25  1623   POC GLUCOSE mg/dl 203* 164* 226* 242* 179* 169* 261* 309* 225* 185* 235* 315*                 Recent Cultures (last 7 days):   Results from last 7 days   Lab Units 06/26/25  1123   BLOOD CULTURE  No Growth at 72 hrs.  No Growth at 72 hrs.       Imaging Results Review: I reviewed radiology reports from this admission including: MRI right foot, right foot x-ray.  Other Study Results Review: No additional pertinent studies reviewed.    Last 24 Hours Medication List:     Current Facility-Administered Medications:     acetaminophen (TYLENOL) tablet 650 mg, Q6H PRN    albuterol (PROVENTIL HFA,VENTOLIN HFA) inhaler 2 puff, Q4H PRN    albuterol inhalation solution 2.5 mg, Q6H PRN    apixaban  (ELIQUIS) tablet 5 mg, BID    atenolol (TENORMIN) tablet 25 mg, Daily    atorvastatin (LIPITOR) tablet 40 mg, QPM    Budeson-Glycopyrrol-Formoterol 160-9-4.8 MCG/ACT AERO 2 puff, Q12H SANDRA    ceFEPime (MAXIPIME) 2,000 mg in dextrose 5 % 50 mL IVPB, Q12H, Last Rate: 2,000 mg (06/30/25 1230)    clopidogrel (PLAVIX) tablet 75 mg, Daily    diltiazem (CARDIZEM) injection 15 mg, Once    diphenhydrAMINE (BENADRYL) injection 25 mg, Q6H PRN    famotidine (PEPCID) tablet 20 mg, Daily    heparin (porcine) injection 1,800 Units, Q6H PRN    heparin (porcine) injection 3,600 Units, Q6H PRN    HYDROmorphone HCl (DILAUDID) injection 0.2 mg, Q2H PRN    insulin glargine (LANTUS) subcutaneous injection 10 Units 0.1 mL, HS    insulin lispro (HumALOG/ADMELOG) 100 units/mL subcutaneous injection 1-5 Units, TID AC **AND** Fingerstick Glucose (POCT), TID AC    insulin lispro (HumALOG/ADMELOG) 100 units/mL subcutaneous injection 1-5 Units, HS    insulin lispro (HumALOG/ADMELOG) 100 units/mL subcutaneous injection 3 Units, TID With Meals    lisinopril (ZESTRIL) tablet 5 mg, Daily    moisture barrier miconazole 2% cream (aka CALEB MOISTURE BARRIER ANTIFUNGAL CREAM), BID    montelukast (SINGULAIR) tablet 10 mg, HS    multivitamin-minerals (CENTRUM) tablet 1 tablet, Daily    naloxone (NARCAN) 0.04 mg/mL syringe 0.04 mg, Q1MIN PRN    ondansetron (ZOFRAN) injection 4 mg, Q6H PRN    oxyCODONE (ROXICODONE) split tablet 2.5 mg, Q4H PRN **OR** oxyCODONE (ROXICODONE) IR tablet 5 mg, Q4H PRN    polyethylene glycol (MIRALAX) packet 17 g, Daily PRN    predniSONE tablet 5 mg, Daily    senna-docusate sodium (SENOKOT S) 8.6-50 mg per tablet 2 tablet, HS    Administrative Statements   Today, Patient Was Seen By: BELGICA Nelson  I have spent a total time of 38 minutes in caring for this patient on the day of the visit/encounter including Patient and family education, Counseling / Coordination of care, Documenting in the medical record,  Reviewing/placing orders in the medical record (including tests, medications, and/or procedures), Obtaining or reviewing history  , and Communicating with other healthcare professionals .    **Please Note: This note may have been constructed using a voice recognition system.**

## 2025-06-30 NOTE — PLAN OF CARE
Problem: PAIN - ADULT  Goal: Verbalizes/displays adequate comfort level or baseline comfort level  Description: Interventions:  - Encourage patient to monitor pain and request assistance  - Assess pain using appropriate pain scale  - Administer analgesics as ordered based on type and severity of pain and evaluate response  - Implement non-pharmacological measures as appropriate and evaluate response  - Consider cultural and social influences on pain and pain management  - Notify physician/advanced practitioner if interventions unsuccessful or patient reports new pain  - Educate patient/family on pain management process including their role and importance of  reporting pain   - Provide non-pharmacologic/complimentary pain relief interventions  Outcome: Progressing     Problem: SAFETY ADULT  Goal: Maintains/Returns to pre admission functional level  Description: INTERVENTIONS:  - Perform AM-PAC 6 Click Basic Mobility/ Daily Activity assessment daily.  - Set and communicate daily mobility goal to care team and patient/family/caregiver.   - Collaborate with rehabilitation services on mobility goals if consulted  - Perform Range of Motion 3 times a day.  - Reposition patient every 2 hours.  - Dangle patient 3 times a day  - Stand patient 3 times a day  - Ambulate patient 3 times a day  - Out of bed for meals   - Out of bed for toileting  - Record patient progress and toleration of activity level   Outcome: Progressing     Problem: METABOLIC, FLUID AND ELECTROLYTES - ADULT  Goal: Electrolytes maintained within normal limits  Description: INTERVENTIONS:  - Monitor labs and assess patient for signs and symptoms of electrolyte imbalances  - Administer electrolyte replacement as ordered  - Monitor response to electrolyte replacements, including repeat lab results as appropriate  - Instruct patient on fluid and nutrition as appropriate  Outcome: Progressing     Problem: SKIN/TISSUE INTEGRITY - ADULT  Goal: Pressure injury  heals and does not worsen  Description: Interventions:  - Implement low air loss mattress or specialty surface (Criteria met)  - Apply silicone foam dressing  - Instruct/assist with weight shifting every hour when in chair   - Limit chair time to 4 hour intervals  - Use special pressure reducing interventions such as waffle cushion when in chair   - Apply fecal or urinary incontinence containment device   - Perform passive or active ROM every shift  - Turn and reposition patient & offload bony prominences every 2 hours   - Utilize friction reducing device or surface for transfers   Outcome: Progressing

## 2025-06-30 NOTE — PROGRESS NOTES
Patient:  LENA SCHMIDT    MRN:  [id number]    Aidin Request ID:  [id number]    Level of care reserved:  Inpatient Rehab Facility    Partner Reserved:  [facility] - ([place]), [place] [address] [phone]    Clinical needs requested:    Geography searched:  40 miles around [id number]    Start of Service:    Request sent:  4:14pm EDT on 6/30/2025 by Johanny Sahu    Partner reserved:  4:24pm EDT on 6/30/2025 by Johanny Sahu    Choice list shared:  4:22pm EDT on 6/30/2025 by Johanny Sahu

## 2025-06-30 NOTE — PHYSICAL THERAPY NOTE
"   PHYSICAL THERAPY TREATMENT NOTE  NAME:  Gold Van  DATE: 06/30/25    Length Of Stay: 13  Performed at least 2 patient identifiers during session: Name and ID bracelet    TREATMENT:      06/30/25 1416   PT Last Visit   PT Visit Date 06/30/25   Note Type   Note Type Treatment   Pain Assessment   Pain Assessment Tool 0-10   Pain Score No Pain   Restrictions/Precautions   Weight Bearing Precautions Per Order Yes   RLE Weight Bearing Per Order NWB   Other Precautions Chair Alarm;Bed Alarm;WBS;Fall Risk;Pain;Multiple lines   General   Family/Caregiver Present Yes   Cognition   Overall Cognitive Status WFL   Arousal/Participation Alert;Responsive;Cooperative   Attention Within functional limits   Orientation Level Oriented X4   Memory Decreased recall of precautions   Following Commands Follows one step commands without difficulty   Comments pt agreeable to PT treatment   Bed Mobility   Supine to Sit 5  Supervision   Additional items HOB elevated;Bedrails;Increased time required;Verbal cues   Additional Comments pt seated OOB in recliner upon conclusion   Transfers   Sit to Stand 3  Moderate assistance   Additional items Assist x 2;Increased time required;Verbal cues;Armrests  (assist required to maintain NWB)   Stand to Sit 3  Moderate assistance   Additional items Assist x 2;Armrests;Impulsive;Bed elevated   Stand pivot 3  Moderate assistance   Additional items Assist x 2;Increased time required;Verbal cues  (noncompliant with WB status requires assist to hold up R LE)   Additional Comments RW used ; verbal cues for proper hand placement and maintaining WB status   Ambulation/Elevation   Gait pattern Decreased foot clearance;Inconsistent criss;Short stride;Excessively slow  (\"hopping\")   Gait Assistance 3  Moderate assist   Additional items Assist x 2;Verbal cues;Tactile cues   Assistive Device Rolling walker   Distance 2-3 \"hops\"   Ambulation/Elevation Additional Comments verbal cues for maintaining WB stus " "and proper sequencing   Balance   Static Sitting Good   Dynamic Sitting Fair +   Static Standing Poor +   Dynamic Standing Poor   Ambulatory Poor -   Endurance Deficit   Endurance Deficit Yes   Endurance Deficit Description fatigues easily with activity   Activity Tolerance   Activity Tolerance Patient limited by fatigue;Patient limited by pain;Other (Comment)  (NWB status)   Nurse Made Aware nursing staff made aware of outcomes   Exercises   Heelslides Sitting;AROM;Bilateral  (30 reps)   Hip Abduction Sitting;AROM;Bilateral  (30 reps)   Hip Adduction Sitting;AROM;Bilateral  (30 reps)   Knee AROM Long Arc Quad Sitting;AROM;Bilateral  (30 reps)   Ankle Pumps Sitting;AROM;Bilateral  (30 reps)   Marching Sitting;AROM;Bilateral  (30 reps)   Assessment   Prognosis Fair   Problem List Decreased strength;Decreased endurance;Impaired balance;Decreased mobility;Pain;Decreased skin integrity   Assessment Pt seen for PT treatment session this date with interventions consisting of gait training w/ emphasis on improving pt's ability to ambulate level surfaces x 2-3 \"hops\" with mod A of 2 provided by therapist with RW, Therapeutic exercise consisting of: AROM 2 sets of 15 reps B LE in sitting position, and therapeutic activity consisting of training: bed mobility, supine<>sit transfers, and sit<>stand transfers. Pt agreeable to PT treatment session upon arrival, pt found supine in bed w/ HOB elevated, in no apparent distress and responsive. In comparison to previous session, pt with improvements in ability to hop . Post session: pt returned back to recliner, chair alarm engaged, all needs in reach, and RN notified of session findings/recommendations. Continue to recommend Level I (Maximum Resource Intensity) at time of d/c in order to maximize pt's functional independence and safety w/ mobility. Pt continues to be functioning below baseline level. PT will continue to see pt during current hospitalization in order to address the " deficits listed above and provide interventions consistent w/ POC in effort to achieve STGs.    Nai Lynn PTA   Barriers to Discharge Inaccessible home environment   Goals   LTG Expiration Date 07/04/25   Plan   Treatment/Interventions Functional transfer training;LE strengthening/ROM;Therapeutic exercise;Endurance training;Bed mobility;Gait training   PT Frequency 3-5x/wk   Discharge Recommendation   Rehab Resource Intensity Level, PT I (Maximum Resource Intensity)   AM-PAC Basic Mobility Inpatient   Turning in Flat Bed Without Bedrails 3   Lying on Back to Sitting on Edge of Flat Bed Without Bedrails 3   Moving Bed to Chair 1   Standing Up From Chair Using Arms 1   Walk in Room 1   Climb 3-5 Stairs With Railing 1   Basic Mobility Inpatient Raw Score 10   Turning Head Towards Sound 4   Follow Simple Instructions 4   Low Function Basic Mobility Raw Score  18   Low Function Basic Mobility Standardized Score  29.25   [facility] Highest Level Of Mobility   -HLM Goal 4: Move to chair/commode   -M Achieved 4: Move to chair/commode   End of Consult   Patient Position at End of Consult Bedside chair;Bed/Chair alarm activated;All needs within reach       The patient's AM-PAC Basic Mobility Inpatient Short Form Raw Score is 10. A Raw score of less than or equal to 16 suggests the patient may benefit from discharge to post-acute rehabilitation services. Please also refer to the recommendation of the Physical Therapist for safe discharge planning.      Nai Lynn PTA,PTA

## 2025-06-30 NOTE — PLAN OF CARE
"  Problem: PHYSICAL THERAPY ADULT  Goal: Performs mobility at highest level of function for planned discharge setting.  See evaluation for individualized goals.  Description: Treatment/Interventions: Functional transfer training, Therapeutic exercise, Endurance training, Gait training, Bed mobility, Equipment eval/education, Elevations  Equipment Recommended:  (TBD)       See flowsheet documentation for full assessment, interventions and recommendations.  Outcome: Progressing  Note: Prognosis: Fair  Problem List: Decreased strength, Decreased endurance, Impaired balance, Decreased mobility, Pain, Decreased skin integrity  Assessment: Pt seen for PT treatment session this date with interventions consisting of gait training w/ emphasis on improving pt's ability to ambulate level surfaces x 2-3 \"hops\" with mod A of 2 provided by therapist with RW, Therapeutic exercise consisting of: AROM 2 sets of 15 reps B LE in sitting position, and therapeutic activity consisting of training: bed mobility, supine<>sit transfers, and sit<>stand transfers. Pt agreeable to PT treatment session upon arrival, pt found supine in bed w/ HOB elevated, in no apparent distress and responsive. In comparison to previous session, pt with improvements in ability to hop . Post session: pt returned back to recliner, chair alarm engaged, all needs in reach, and RN notified of session findings/recommendations. Continue to recommend Level I (Maximum Resource Intensity) at time of d/c in order to maximize pt's functional independence and safety w/ mobility. Pt continues to be functioning below baseline level. PT will continue to see pt during current hospitalization in order to address the deficits listed above and provide interventions consistent w/ POC in effort to achieve STGs.    Nai Lynn, PTA  Barriers to Discharge: Inaccessible home environment     Rehab Resource Intensity Level, PT: I (Maximum Resource Intensity)    See flowsheet " documentation for full assessment.

## 2025-06-30 NOTE — ASSESSMENT & PLAN NOTE
Patient follows chronically without podiatry/wound management. 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 Now with MRI of the R foot showing soft tissue ulcer overlying the area of 4-5th ray surgery without underlying abscess, and marrow edema of the residual 4th and 5th metatarsals as well as the 3rd metatarsal head and 3rd proximal phalanx. Podiatry performed R TMA on 6/23/2025. Operative cultures with growth of MSSA, pseudomonas, and serratia. Surgical cure was not felt to be achieved.  -antibiotic as above  -serial R foot exams  -local care/VAC care per podiatry  -continue follow up with podiatry

## 2025-06-30 NOTE — ASSESSMENT & PLAN NOTE
Lab Results   Component Value Date    EGFR 84 06/30/2025    EGFR 82 06/29/2025    EGFR 86 06/28/2025    CREATININE 0.81 06/30/2025    CREATININE 0.85 06/29/2025    CREATININE 0.76 06/28/2025     POA with creatinine 1.49  Likely prerenal in setting of shock state which is now resolved  Creatinine currently at baseline, euvolemic on exam  Avoid nephrotoxins and hypotension  GERDA resolved

## 2025-06-30 NOTE — ASSESSMENT & PLAN NOTE
Noted on MRI imaging  Patient follows with podiatry outpatient for chronic wounds, podiatry continues to follow inpatient  1 of 2 blood cultures on admission with MSSA  S/p right TMA on 6/23/25  Operative cultures with MSSA, Pseudomonas and Serratia  Appreciate ID who are following-vancomycin and Zosyn discontinued 6/26, initiated cefepime   6/26 cultures x 2 negative after 72 hours  See plan for MSSA bacteremia  Dressings per podiatry

## 2025-06-30 NOTE — PROGRESS NOTES
Patient:  LENA SCHMIDT    MRN:  [id number]    Aidin Request ID:  [id number]    Level of care reserved:    Partner Reserved:    Clinical needs requested:    Geography searched:  40 miles around [id number]    Start of Service:    Request sent:  4:14pm EDT on 6/30/2025 by Johanny Sahu    Partner reserved:  by Johanny Sahu    Choice list shared:  4:22pm EDT on 6/30/2025 by Johanny Sahu

## 2025-06-30 NOTE — WOUND OSTOMY CARE
Progress Note - Wound   Gold Van 79 y.o. male MRN: [id number]  Unit/Bed#: -01 Encounter: [id number]        Assessment:   Patient is seen for wound care follow-up. Patient is a 78 yo male that was admitted to [facility] for treatment of septic shock. Patient is an assist x 1  for turning and repositioning. Patient is continent of bowel and bladder. On assessment, patient is lying in bed on standard mattress.      Findings:  POA bilateral sacro-buttock evolving DTI: Wounds on buttocks are pictured and measured separately, however having difficulty with images loading. Left buttock wound is oval in shape, a mixture of pink and yellow tissue, scant serosanguinous drainage noted. Right buttock wound is oval in shape, mixture of pink and yellow tissue. Scant amount of serosanguinous drainage on dressing when removed. Triad and silicone foam dressing.   Skin tear to right upper arm: wound is irregular in shape, pink partial thickness skin loss with purple skin flap, bleeding post removal of dressing, small serosanguinous drainage. Miranda-wound dry intact. Recommend dermagran and DSD.  Right lower leg skin tears: Multiple wounds pink partial thickness skin loss, scant serosanguinous drainage. Miranda-wound dry intact.    Left lower leg skin tear: Multiple wounds pictured and measured together. Pink partial thickness skin loss, scant serosanguinous drainage noted. Miranda-wound dry intact pink.   MASD/ITD rash to scrotum, bilateral groin and inner thighs, penis and perineum to perianal regions: Skin intact, pink hyperpigmented areas noted. Musa order in place.   HA Right heel pressure injury: Multiple wounds pictured together, images not loading in chart. Distal wound is round non blanchable pink intact tissue, no open wounds noted. Proximal wound is deeper pink, non blanchable intact tissue. No open wounds noted. Recommend silicone foam dressings.       Educated patient on importance of frequent offloading of  pressure via turning, repositioning and weight-shifting. Patient verbalized understanding of plan of care.     No induration, fluctuance, odor, warmth/temperature differences, redness, or purulence noted to the above noted wounds and skin areas assessed. New dressings applied per orders listed below. Patient tolerated well- no s/s of non-verbal pain or discomfort observed during the encounter. Bedside nurse aware of plan of care. See flow sheets for more detailed assessment findings.      Orders listed below and wound care will continue to follow, call or Secure Chat Message with questions.         Skin and Wound Care Plan:   1- Bilateral pretibial wounds: cleanse with VASHE, apply non-adherent oil emulsion dressings on the wound beds then top with Dermagran, cover with 4X4 and wrap with becki, change daily and PRN  2- Apply Triad paste to wounds to the right and left upper buttocks/sacral region, cover with Mepilex bordered foam dressing, ramone with T, date, change daily and PRN  3- Offloading green wedges  4- MASD/ITD groin, scrotum, upper thighs and perianal skin: Cleanse with soap and water, pat dry. Apply Musa cream to  2x/day and as needed if soiled.  5- Elevate heels with pillows to offload pressure  6- Ehob offloading cushion when OOB to the chair  7- Place patient on ATR turning and repositioning system.  8- Moisturize skin daily with skin nourishing cream  9- Apply silicone bordered foam dressings (Mepilex) to left Heel. Ramone with P for Prevention and change every 3 days or PRN. Peel back and inspect Q-shift.  10- Right foot managed by podiatry  11- Recommend patient follow up with out patient wound care referral for bilateral buttocks wounds. Referral already in place.   12: Right heel: Apply Mepilex (silicone foam dressing) to heel, ramone T for treatment, date. Change every other day and as needed if soiled/displaced. Peel back dressing every shift to inspect skin integrity.         WOUNDS:  Wound 06/17/25  Pressure Injury Buttocks Mid;Right (Active)   Wound Image   06/30/25 1054   Wound Description Pink 06/30/25 1054   Pressure Injury Stage DTPI 06/30/25 1054   Non-staged Wound Description Full thickness 06/30/25 1054   Wound Length (cm) 3 cm 06/30/25 1054   Wound Width (cm) 1 cm 06/30/25 1054   Wound Depth (cm) 0.3 cm 06/30/25 1054   Wound Surface Area (cm^2) 2.36 cm^2 06/30/25 1054   Wound Volume (cm^3) 0.471 cm^3 06/30/25 1054   Calculated Wound Volume (cm^3) 0.9 cm^3 06/30/25 1054   Change in Wound Size % -136.84 06/30/25 1054   Drainage Amount Scant 06/30/25 1054   Drainage Description Serosanguineous 06/30/25 1054   Miranda-wound Assessment Dry;Intact 06/30/25 1054   Treatments Cleansed;Site care 06/30/25 1054   Dressing Other (Comment) 06/30/25 1054   Wound packed? No 06/30/25 1054   Dressing Changed New 06/30/25 1054   Patient Tolerance Tolerated well 06/30/25 1054   Dressing Status Intact 06/30/25 1054       Wound 05/16/25 Traumatic Skin Tear Pretibial Left (Active)   Wound Image    06/30/25 1101   Wound Description Pink 06/30/25 1101   Non-staged Wound Description Partial thickness 06/30/25 1101   Wound Length (cm) 475 cm 06/30/25 1101   Wound Width (cm) 6.5 cm 06/30/25 1101   Wound Depth (cm) 0.2 cm 06/30/25 1101   Wound Surface Area (cm^2) 2424.91 cm^2 06/30/25 1101   Wound Volume (cm^3) 323.322 cm^3 06/30/25 1101   Calculated Wound Volume (cm^3) 617.5 cm^3 06/30/25 1101   Change in Wound Size % -905472.03 06/30/25 1101   Drainage Amount Scant 06/30/25 1101   Drainage Description Serosanguineous 06/30/25 1101   Miranda-wound Assessment Dry;Intact 06/30/25 1101   Treatments Cleansed;Site care 06/30/25 1101   Dressing Dermagran gauze;Dry dressing 06/30/25 1101   Wound packed? No 06/30/25 1101   Dressing Changed Changed 06/30/25 1101   Patient Tolerance Tolerated well 06/30/25 1101   Dressing Status Clean;Dry;Intact 06/30/25 1101       Wound 05/16/25 Diabetic Ulcer Foot Right;Lateral (Active)   Wound Image      06/28/25 1600   Wound Description GUILLE 06/27/25 0900   Drainage Description Serous 06/28/25 1600   Miranda-wound Assessment Intact;Dry;Erythema 06/28/25 1600   Treatments Cleansed;Site care;Elevated 06/28/25 1600   Wound Site Closure Sutures 06/28/25 1600   Dressing Vaseline gauze;Dry dressing;Gauze;ABD 06/28/25 1600   Dressing Changed Changed 06/28/25 1600   Patient Tolerance Tolerated well 06/28/25 1600   Dressing Status Clean;Dry;Intact 06/29/25 1624       Wound 06/18/25 Groin (Active)   Wound Image    06/30/25 1048   Wound Description Intact;Dry;Pink 06/30/25 1048   Non-staged Wound Description Not applicable 06/30/25 1048   Wound Length (cm) 0 cm 06/30/25 1048   Wound Width (cm) 0 cm 06/30/25 1048   Wound Depth (cm) 0 cm 06/30/25 1048   Wound Surface Area (cm^2) 0 cm^2 06/30/25 1048   Wound Volume (cm^3) 0 cm^3 06/30/25 1048   Calculated Wound Volume (cm^3) 0 cm^3 06/30/25 1048   Change in Wound Size % 100 06/30/25 1048   Drainage Amount None 06/30/25 1048   Miranda-wound Assessment Clean;Dry;Intact 06/30/25 1048   Treatments Cleansed;Site care 06/30/25 1048   Dressing Protective barrier 06/30/25 1048   Wound packed? No 06/30/25 1048   Dressing Changed Changed 06/30/25 1048   Patient Tolerance Tolerated well 06/30/25 1048   Dressing Status Intact 06/30/25 1048       Wound 06/18/25 Pretibial Right (Active)   Wound Image   06/23/25 1202   Wound Description Beefy red;Fragile 06/28/25 1600   Non-staged Wound Description Partial thickness 06/28/25 1600   Wound Length (cm) 2.1 cm 06/23/25 1202   Wound Width (cm) 0.9 cm 06/23/25 1202   Wound Depth (cm) 0.1 cm 06/23/25 1202   Wound Surface Area (cm^2) 1.48 cm^2 06/23/25 1202   Wound Volume (cm^3) 0.099 cm^3 06/23/25 1202   Calculated Wound Volume (cm^3) 0.19 cm^3 06/23/25 1202   Change in Wound Size % -46.15 06/23/25 1202   Drainage Amount Scant 06/27/25 0900   Drainage Description Serous 06/28/25 1600   Miranda-wound Assessment Hyperpigmented 06/28/25 1600   Treatments  Cleansed;Elevated;Site care 06/29/25 0830   Dressing Vaseline gauze;Dermagran gauze 06/28/25 1600   Wound packed? No 06/19/25 1600   Dressing Changed Changed 06/28/25 1600   Patient Tolerance Tolerated well 06/28/25 1600   Dressing Status Clean;Dry;Intact 06/29/25 2100       Wound 06/23/25 Traumatic Skin Tear Knee Anterior;Right (Active)   Wound Image   06/23/25 1155   Wound Description Fragile;Beefy red 06/28/25 1600   Non-staged Wound Description Partial thickness 06/28/25 1600   Wound Length (cm) 1.5 cm 06/23/25 1155   Wound Width (cm) 1 cm 06/23/25 1155   Wound Depth (cm) 0.1 cm 06/23/25 1155   Wound Surface Area (cm^2) 1.18 cm^2 06/23/25 1155   Wound Volume (cm^3) 0.079 cm^3 06/23/25 1155   Calculated Wound Volume (cm^3) 0.15 cm^3 06/23/25 1155   Drainage Amount Scant 06/27/25 0900   Drainage Description Serosanguineous 06/28/25 1600   Miranda-wound Assessment Hyperpigmented 06/28/25 1600   Treatments Cleansed;Site care 06/28/25 1600   Dressing Dermagran gauze;Vaseline gauze 06/28/25 1600   Dressing Changed Changed 06/28/25 1600   Patient Tolerance Tolerated well 06/28/25 1600   Dressing Status Clean;Dry;Intact 06/29/25 2100       Wound 06/17/25 Pressure Injury Buttocks Left (Active)   Wound Image   06/30/25 1050   Wound Description Pink;Yellow 06/30/25 1050   Pressure Injury Stage DTPI 06/30/25 1050   Non-staged Wound Description Full thickness 06/30/25 1050   Wound Length (cm) 0.3 cm 06/30/25 1050   Wound Width (cm) 0.3 cm 06/30/25 1050   Wound Depth (cm) 0.2 cm 06/30/25 1050   Wound Surface Area (cm^2) 0.07 cm^2 06/30/25 1050   Wound Volume (cm^3) 0.009 cm^3 06/30/25 1050   Calculated Wound Volume (cm^3) 0.02 cm^3 06/30/25 1050   Drainage Amount Scant 06/30/25 1050   Drainage Description Serosanguineous 06/30/25 1050   Miranda-wound Assessment Pink 06/30/25 1050   Treatments Cleansed;Site care 06/30/25 1050   Dressing Other (Comment) 06/30/25 1050   Wound packed? No 06/30/25 1050   Dressing Changed Changed  06/30/25 1050   Patient Tolerance Tolerated well 06/30/25 1050   Dressing Status Intact 06/30/25 1050       Wound 06/28/25 Arm Anterior;Left (Active)   Wound Image   06/30/25 1116   Wound Description Pink 06/30/25 1116   Non-staged Wound Description Partial thickness 06/30/25 1116   Wound Length (cm) 1 cm 06/30/25 1116   Wound Width (cm) 2 cm 06/30/25 1116   Wound Depth (cm) 0.2 cm 06/30/25 1116   Wound Surface Area (cm^2) 1.57 cm^2 06/30/25 1116   Wound Volume (cm^3) 0.209 cm^3 06/30/25 1116   Calculated Wound Volume (cm^3) 0.4 cm^3 06/30/25 1116   Drainage Amount Small 06/30/25 1116   Drainage Description Serosanguineous 06/30/25 1116   Miranda-wound Assessment Pink;Bleeding 06/30/25 1116   Treatments Cleansed;Site care 06/30/25 1116   Dressing Dermagran gauze;Foam, Silicon (eg. Allevyn, etc) 06/30/25 1116   Wound packed? No 06/30/25 1116   Dressing Changed New 06/30/25 1116   Patient Tolerance Tolerated well 06/30/25 1116   Dressing Status Clean;Intact;Dry 06/30/25 1116          TANNA Rodriges, RN

## 2025-06-30 NOTE — PROCEDURES
Venous Access Line Insertion    Date/Time: 6/30/2025 4:30 PM    Performed by: Juan Patrick MD  Authorized by: Juan Patrick MD    Patient location:  IR  Other Assisting Provider: No    Consent:     Consent obtained:  Written    Consent given by:  Patient    Risks discussed:  Arterial puncture, bleeding and infection    Alternatives discussed:  No treatment and delayed treatment  Universal protocol:     Procedure explained and questions answered to patient or proxy's satisfaction: yes      Immediately prior to procedure, a time out was called: yes      Relevant documents present and verified: yes      Test results available and properly labeled: yes      Radiology Images displayed and confirmed.  If images not available, report reviewed: yes      Required blood products, implants, devices, and special equipment available: yes      Site/side marked: yes      Patient identity confirmed:  Verbally with patient and arm band  Pre-procedure details:     Hand hygiene: Hand hygiene performed prior to insertion      Sterile barrier technique: All elements of maximal sterile technique followed      Skin preparation:  2% chlorhexidine    Skin preparation agent: Skin preparation agent completely dried prior to procedure    Procedure details:     Complex Venous Access Line Type: PICC      Complex Venous Access Line Indications: long term antibiotics      Catheter tip vessel location: atriocaval junction      Orientation:  Right    Location:  Brachial    Procedural supplies:  Single lumen    Catheter size:  4.5 Fr    Total catheter length (cm):  Please see PACS    Catheter out on skin (cm):  0    Max flow rate:  5    Arm circumference:  Please see PACS    Approach: percutaneous technique used      Patient position:  Flat    Sterile ultrasound techniques: Sterile gel and sterile probe covers were used      Number of attempts:  1    Successful placement: yes      Landmarks identified: yes    Anesthesia (see MAR for exact  dosages):     Anesthesia method:  Local infiltration    Local anesthetic:  Lidocaine 1% w/o epi  Post-procedure details:     Post-procedure:  Dressing applied and securement device placed    Assessment:  Blood return through all ports and placement verified by x-ray    Post-procedure complications: none      Patient tolerance of procedure:  Tolerated well, no immediate complications

## 2025-07-01 LAB
ANION GAP SERPL CALCULATED.3IONS-SCNC: 7 MMOL/L (ref 4–13)
BACTERIA BLD CULT: NORMAL
BACTERIA BLD CULT: NORMAL
BUN SERPL-MCNC: 29 MG/DL (ref 5–25)
CALCIUM SERPL-MCNC: 9.1 MG/DL (ref 8.4–10.2)
CHLORIDE SERPL-SCNC: 100 MMOL/L (ref 96–108)
CO2 SERPL-SCNC: 25 MMOL/L (ref 21–32)
CREAT SERPL-MCNC: 0.77 MG/DL (ref 0.6–1.3)
ERYTHROCYTE [DISTWIDTH] IN BLOOD BY AUTOMATED COUNT: 15.2 % (ref 11.6–15.1)
GFR SERPL CREATININE-BSD FRML MDRD: 86 ML/MIN/1.73SQ M
GLUCOSE SERPL-MCNC: 164 MG/DL (ref 65–140)
GLUCOSE SERPL-MCNC: 183 MG/DL (ref 65–140)
GLUCOSE SERPL-MCNC: 194 MG/DL (ref 65–140)
GLUCOSE SERPL-MCNC: 229 MG/DL (ref 65–140)
GLUCOSE SERPL-MCNC: 248 MG/DL (ref 65–140)
HCT VFR BLD AUTO: 31 % (ref 36.5–49.3)
HGB BLD-MCNC: 9.8 G/DL (ref 12–17)
MCH RBC QN AUTO: 27.8 PG (ref 26.8–34.3)
MCHC RBC AUTO-ENTMCNC: 31.6 G/DL (ref 31.4–37.4)
MCV RBC AUTO: 88 FL (ref 82–98)
MRSA NOSE QL CULT: NORMAL
PLATELET # BLD AUTO: 376 THOUSANDS/UL (ref 149–390)
PMV BLD AUTO: 9.3 FL (ref 8.9–12.7)
POTASSIUM SERPL-SCNC: 4.2 MMOL/L (ref 3.5–5.3)
RBC # BLD AUTO: 3.52 MILLION/UL (ref 3.88–5.62)
SODIUM SERPL-SCNC: 132 MMOL/L (ref 135–147)
WBC # BLD AUTO: 6.42 THOUSAND/UL (ref 4.31–10.16)

## 2025-07-01 PROCEDURE — 97530 THERAPEUTIC ACTIVITIES: CPT

## 2025-07-01 PROCEDURE — 97110 THERAPEUTIC EXERCISES: CPT

## 2025-07-01 PROCEDURE — 82948 REAGENT STRIP/BLOOD GLUCOSE: CPT

## 2025-07-01 PROCEDURE — 99232 SBSQ HOSP IP/OBS MODERATE 35: CPT | Performed by: INTERNAL MEDICINE

## 2025-07-01 PROCEDURE — 94760 N-INVAS EAR/PLS OXIMETRY 1: CPT

## 2025-07-01 PROCEDURE — 99232 SBSQ HOSP IP/OBS MODERATE 35: CPT

## 2025-07-01 PROCEDURE — 94664 DEMO&/EVAL PT USE INHALER: CPT

## 2025-07-01 PROCEDURE — 80048 BASIC METABOLIC PNL TOTAL CA: CPT

## 2025-07-01 PROCEDURE — 85027 COMPLETE CBC AUTOMATED: CPT

## 2025-07-01 RX ADMIN — INSULIN GLARGINE 10 UNITS: 100 INJECTION, SOLUTION SUBCUTANEOUS at 22:05

## 2025-07-01 RX ADMIN — INSULIN LISPRO 1 UNITS: 100 INJECTION, SOLUTION INTRAVENOUS; SUBCUTANEOUS at 08:28

## 2025-07-01 RX ADMIN — APIXABAN 5 MG: 5 TABLET, FILM COATED ORAL at 08:26

## 2025-07-01 RX ADMIN — MICONAZOLE NITRATE: 20 CREAM TOPICAL at 18:30

## 2025-07-01 RX ADMIN — FAMOTIDINE 20 MG: 20 TABLET, FILM COATED ORAL at 08:26

## 2025-07-01 RX ADMIN — INSULIN LISPRO 3 UNITS: 100 INJECTION, SOLUTION INTRAVENOUS; SUBCUTANEOUS at 16:37

## 2025-07-01 RX ADMIN — CEFEPIME 2000 MG: 2 INJECTION, POWDER, FOR SOLUTION INTRAVENOUS at 11:29

## 2025-07-01 RX ADMIN — PREDNISONE 5 MG: 5 TABLET ORAL at 08:26

## 2025-07-01 RX ADMIN — LISINOPRIL 5 MG: 5 TABLET ORAL at 08:26

## 2025-07-01 RX ADMIN — BUDESONIDE, GLYCOPYRROLATE, AND FORMOTEROL FUMARATE 2 PUFF: 160; 9; 4.8 AEROSOL, METERED RESPIRATORY (INHALATION) at 08:25

## 2025-07-01 RX ADMIN — INSULIN LISPRO 3 UNITS: 100 INJECTION, SOLUTION INTRAVENOUS; SUBCUTANEOUS at 12:53

## 2025-07-01 RX ADMIN — ATENOLOL 25 MG: 25 TABLET ORAL at 08:26

## 2025-07-01 RX ADMIN — CLOPIDOGREL 75 MG: 75 TABLET ORAL at 08:26

## 2025-07-01 RX ADMIN — MICONAZOLE NITRATE: 20 CREAM TOPICAL at 08:30

## 2025-07-01 RX ADMIN — MONTELUKAST 10 MG: 10 TABLET, FILM COATED ORAL at 22:05

## 2025-07-01 RX ADMIN — APIXABAN 5 MG: 5 TABLET, FILM COATED ORAL at 17:21

## 2025-07-01 RX ADMIN — INSULIN LISPRO 2 UNITS: 100 INJECTION, SOLUTION INTRAVENOUS; SUBCUTANEOUS at 16:36

## 2025-07-01 RX ADMIN — BUDESONIDE, GLYCOPYRROLATE, AND FORMOTEROL FUMARATE 2 PUFF: 160; 9; 4.8 AEROSOL, METERED RESPIRATORY (INHALATION) at 22:05

## 2025-07-01 RX ADMIN — Medication 1 TABLET: at 08:26

## 2025-07-01 RX ADMIN — ATORVASTATIN CALCIUM 40 MG: 40 TABLET, FILM COATED ORAL at 17:21

## 2025-07-01 RX ADMIN — INSULIN LISPRO 2 UNITS: 100 INJECTION, SOLUTION INTRAVENOUS; SUBCUTANEOUS at 22:05

## 2025-07-01 RX ADMIN — INSULIN LISPRO 3 UNITS: 100 INJECTION, SOLUTION INTRAVENOUS; SUBCUTANEOUS at 08:28

## 2025-07-01 RX ADMIN — CEFEPIME 2000 MG: 2 INJECTION, POWDER, FOR SOLUTION INTRAVENOUS at 18:30

## 2025-07-01 RX ADMIN — INSULIN LISPRO 1 UNITS: 100 INJECTION, SOLUTION INTRAVENOUS; SUBCUTANEOUS at 12:53

## 2025-07-01 NOTE — PLAN OF CARE
Problem: PAIN - ADULT  Goal: Verbalizes/displays adequate comfort level or baseline comfort level  Description: Interventions:  - Encourage patient to monitor pain and request assistance  - Assess pain using appropriate pain scale  - Administer analgesics as ordered based on type and severity of pain and evaluate response  - Implement non-pharmacological measures as appropriate and evaluate response  - Consider cultural and social influences on pain and pain management  - Notify physician/advanced practitioner if interventions unsuccessful or patient reports new pain  - Educate patient/family on pain management process including their role and importance of  reporting pain   - Provide non-pharmacologic/complimentary pain relief interventions  Outcome: Progressing     Problem: INFECTION - ADULT  Goal: Absence of fever/infection during neutropenic period  Description: INTERVENTIONS:  - Monitor WBC  - Perform strict hand hygiene  - Limit to healthy visitors only  - No plants, dried, fresh or silk flowers with otoole in patient room  Outcome: Progressing     Problem: Nutrition/Hydration-ADULT  Goal: Nutrient/Hydration intake appropriate for improving, restoring or maintaining nutritional needs  Description: Monitor and assess patient's nutrition/hydration status for malnutrition. Collaborate with interdisciplinary team and initiate plan and interventions as ordered.  Monitor patient's weight and dietary intake as ordered or per policy. Utilize nutrition screening tool and intervene as necessary. Determine patient's food preferences and provide high-protein, high-caloric foods as appropriate.     INTERVENTIONS:  - Monitor oral intake, urinary output, labs, and treatment plans  - Assess nutrition and hydration status and recommend course of action  - Evaluate amount of meals eaten  - Assist patient with eating if necessary   - Allow adequate time for meals  - Recommend/ encourage appropriate diets, oral nutritional  supplements, and vitamin/mineral supplements  - Order, calculate, and assess calorie counts as needed  - Recommend, monitor, and adjust tube feedings and TPN/PPN based on assessed needs  - Assess need for intravenous fluids  - Provide specific nutrition/hydration education as appropriate  - Include patient/family/caregiver in decisions related to nutrition  Outcome: Progressing

## 2025-07-01 NOTE — PROGRESS NOTES
Progress Note - Infectious Disease   Name: Gold Van 79 y.o. male I MRN: [id number]  Unit/Bed#: -01 I Date of Admission: 6/17/2025   Date of Service: 7/1/2025 I Hospital Day: 14     Administrative Statements   VIRTUAL CARE DOCUMENTATION:     1. This service was provided via Telemedicine using [employer]     2. Parties in the room with patient during teleconsult Patient only    3. Confidentiality My office door was closed     4. Participants No one else was in the room    5. Patient acknowledged consent and understanding of privacy and security of the  Telemedicine consult. I informed the patient that I have reviewed their record in Epic and presented the opportunity for them to ask any questions regarding the visit today.  The patient agreed to participate.    6. I have spent a total time of 30 minutes in caring for this patient on the day of the visit/encounter including Patient and family education, Impressions, Counseling / Coordination of care, Documenting in the medical record, Reviewing/placing orders in the medical record (including tests, medications, and/or procedures), Obtaining or reviewing history  , and Communicating with other healthcare professionals , not including the time spent for establishing the audio/video connection.     Assessment & Plan  Septic shock (HCC)  Leukocytosis, tachycardia, tachycardia, lactic acidosis, and hypotension requiring temporary pressor support. 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 No other clear source appreciated. The patient has clinically improved and no longer requires pressor support. He has transitioned out of ICU to med-surg care. Patient's remained afebrile. His WBC count has trended down. Repeat blood cultures are negative >4 days.   -antibiotic as below  -check CBCD and BMP tomorrow  -follow up repeat blood cultures  -monitor vitals  -supportive care  MSSA bacteremia  Likely secondary to R foot osteomyelitis. Patient's admission  blood cultures with growth of MSSA in one. MSSA also present in R foot wound culture. He has no implanted devices or surgical hardware. TTE was a technically difficulty study but showed no valvular vegetations. Will not pursue DENISE at this time given clear source and given that patient will require prolonged antibiotic for foot treatment anyway. Patient has been transition to IV Cefepime for coverage of both his bacteremia and foot infection. He is tolerating antibiotic without difficulty. RUE PICC has been placed. Repeat blood cultures are negative >4 days.    -continue IV cefepime but increased dosing to 2g q8 for improved renal function, through 8/6/2025 for 6 weeks of IV antibiotic after cleared blood cultures and in setting of R foot osteomyelitis  -weekly CBCD and creatinine while on IV antibiotic  -follow up repeat blood cultures  -monitor vitals  -PICC to be removed by RN after final dose of IV antibiotic on 8/6/2025  -patient to follow up in the ID office after discharge, appointment information is in the discharge planning  Osteomyelitis of right foot (HCC)  Patient follows chronically without podiatry/wound management. 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 Now with MRI of the R foot showing soft tissue ulcer overlying the area of 4-5th ray surgery without underlying abscess, and marrow edema of the residual 4th and 5th metatarsals as well as the 3rd metatarsal head and 3rd proximal phalanx. Podiatry performed R TMA on 6/23/2025. Operative cultures with growth of MSSA, pseudomonas, and serratia. Surgical cure was not felt to be achieved.  -antibiotic as above  -serial R foot exams  -local care/VAC care per podiatry  -continue follow up with podiatry   Type 2 diabetes mellitus with complication, without long-term current use of insulin ([facility])  Lab Results   Component Value Date    HGBA1C 6.6 (H) 06/18/2025   Elevated blood glucose is risk factor for wounds and infection.   -recommend tight  glycemic control  -blood glucose management per primary service  Acute renal failure superimposed on stage 2 chronic kidney disease  (HCC)  Lab Results   Component Value Date    EGFR 86 2025    EGFR 84 2025    EGFR 82 2025    CREATININE 0.77 2025    CREATININE 0.81 2025    CREATININE 0.85 2025   This impacts antibiotic dosing. Upon review of patient's medical records it appears his baseline creatinine is approximately 0.7-0.9. Creatinine was 1.49 upon admission. Creatinine is now improved, was 0.77 this morning.  -monitor creatinine  -dose adjust antibiotic for renal function as needed  -avoid nephrotoxins  -volume management per primary service   Allergy to antibiotic  Patient reports SOB with ciprofloxacin and Bactrim. We will avoid these antibiotics for now.  -monitor patient for adverse medication reactions     Above plan was discussed in detail with patient over the TV kit.  Above plan was discussed in detail with SRI who agrees with plan for ongoing cefepime.    Subjective:  Patient reports he's doing well this morning. No new pain in his R foot, no concerns with his dressing. He had some bleeding at the PICC site last night, now with new clean dressing and no ongoing leak this morning. He has no fever, chills, sweats, shakes; no nausea, vomiting, abdominal pain; no cough, shortness of breath, or chest pain. No new symptoms.    Antibiotics:  Cefepime 6  Antibiotics 15    Physical Exam   Temp:  [98.2 °F (36.8 °C)-98.6 °F (37 °C)] 98.2 °F (36.8 °C)  HR:  [75-77] 75  Resp:  [16-18] 16  BP: (106-141)/(60-79) 135/66  SpO2:  [95 %-97 %] 96 %  Temp (24hrs), Av.3 °F (36.8 °C), Min:98.2 °F (36.8 °C), Max:98.6 °F (37 °C)  Current: Temperature: 98.2 °F (36.8 °C)    Intake/Output Summary (Last 24 hours) at 2025 0607  Last data filed at 2025 0429  Gross per 24 hour   Intake --   Output 1275 ml   Net -1275 ml       Physical exam findings reported by bedside and primary  medical team staff, also observed on TV kit:  General Appearance:  Alert, interactive, nontoxic, no acute distress. He appears comfortable sitting up in bed wearing his glasses.   Lungs:   Respirations unlabored on room air.   Heart:  RRR; no murmur, rub or gallop.   Abdomen:   Soft, non-tender, non-distended.    Extremities: RUE PICC intact. R foot dressing intact.   Skin: No new rashes noted on exposed skin.     Invasive Devices:   PICC Line 06/30/25 Right Basilic (Active)   Reasons to continue PICC Long term antibiotics 06/30/25 1544   Site Assessment [facility] 06/30/25 2228   #1 Lumen Color/Status Purple lumen;Blood return noted;Flushed & Clamped 06/30/25 2228   Dressing Type Chlorhexidine dressing;Securing device;Transparent 07/01/25 0453   Dressing Status Clean;Dry;Intact 07/01/25 0453   Dressing Intervention Dressing changed 07/01/25 0453   Dressing Change Due 07/08/25 07/01/25 0453       Peripheral IV 06/28/25 Right;Ventral (anterior) Forearm (Active)   Site Assessment [facility] 06/30/25 2228   Dressing Type Transparent 06/30/25 2228   Line Status Flushed 06/30/25 2228   Dressing Status Clean;Dry;Intact 06/30/25 2228   Dressing Change Due 07/02/25 06/30/25 1200   Reason Not Rotated Not due 06/30/25 1200       Labs, Imaging, & Other Studies   I have personally reviewed pertinent labs.    Results from last 7 days   Lab Units 07/01/25  0439 06/30/25  0452 06/29/25  0433   WBC Thousand/uL 6.42 6.82 5.86   HEMOGLOBIN g/dL 9.8* 9.8* 10.3*   PLATELETS Thousands/uL 376 378 382     Results from last 7 days   Lab Units 07/01/25  0439   POTASSIUM mmol/L 4.2   CHLORIDE mmol/L 100   CO2 mmol/L 25   BUN mg/dL 29*   CREATININE mg/dL 0.77   EGFR ml/min/1.73sq m 86   CALCIUM mg/dL 9.1     Results from last 7 days   Lab Units 06/26/25  1123   BLOOD CULTURE  No Growth After 4 Days.  No Growth After 4 Days.

## 2025-07-01 NOTE — PHYSICAL THERAPY NOTE
PHYSICAL THERAPY TREATMENT NOTE  NAME:  Gold Van  DATE: 07/01/25    Length Of Stay: 14  Performed at least 2 patient identifiers during session: Name and ID bracelet    TREATMENT:      07/01/25 1414   PT Last Visit   PT Visit Date 07/01/25   Note Type   Note Type Treatment   Pain Assessment   Pain Assessment Tool 0-10   Pain Score 10 - Worst Possible Pain   Pain Location/Orientation Location: Buttocks   Pain Onset/Description Onset: Ongoing;Descriptor: Burning   Effect of Pain on Daily Activities comfort   Patient's Stated Pain Goal No pain   Hospital Pain Intervention(s) Medication (See MAR);Repositioned;Ambulation/increased activity;Emotional support   Restrictions/Precautions   RLE Weight Bearing Per Order NWB   Other Precautions Chair Alarm;Bed Alarm;WBS;Fall Risk;Pain  (decreased skin integrity)   General   Family/Caregiver Present Yes   Cognition   Overall Cognitive Status WFL   Arousal/Participation Alert;Responsive;Cooperative   Attention Within functional limits   Orientation Level Oriented X4   Memory Decreased recall of precautions   Following Commands Follows one step commands without difficulty   Comments pt agreeable to PT treatment   Bed Mobility   Supine to Sit 5  Supervision   Additional items Assist x 1;HOB elevated;Bedrails;Increased time required;Verbal cues   Additional Comments patient seated OOB in recliner upon conclusion   Transfers   Sit to Stand 3  Moderate assistance   Additional items Assist x 2;Increased time required;Verbal cues   Stand to Sit 3  Moderate assistance   Additional items Assist x 2;Increased time required;Verbal cues   Stand pivot 3  Moderate assistance   Additional items Assist x 2;Increased time required;Verbal cues   Additional Comments RW used, noncompliant with NWB on R LE   Balance   Static Sitting Fair +   Dynamic Sitting Fair   Static Standing Poor +   Dynamic Standing Poor   Endurance Deficit   Endurance Deficit Yes   Endurance Deficit Description pt  reports fatigue with mobility   Activity Tolerance   Activity Tolerance Patient limited by fatigue;Patient limited by pain;Other (Comment)  (NWB status, decreased skin integrity)   Nurse Made Aware nursing staff made aware of outcomes   Exercises   Quad Sets Sitting;AROM;Bilateral  (30 reps)   Heelslides Sitting;AROM;Bilateral  (30 reps)   Hip Abduction Sitting;AROM;Bilateral  (30 reps)   Hip Adduction Sitting;AROM;Bilateral  (30 reps)   Knee AROM Long Arc Quad Sitting;AROM;Bilateral  (30 reps)   Ankle Pumps Sitting;AROM;Bilateral  (30 reps)   Marching Sitting;AROM;Bilateral  (30 reps)   Assessment   Prognosis Fair   Problem List Decreased strength;Decreased endurance;Impaired balance;Decreased mobility;Decreased skin integrity;Pain   Assessment Pt seen for PT treatment session this date with interventions consisting of Therapeutic exercise consisting of: AROM 2 sets of 15 reps B LE in sitting position and therapeutic activity consisting of training: bed mobility, supine<>sit transfers, sit<>stand transfers, and stand pivot transfers towards L direction. Pt agreeable to PT treatment session upon arrival, pt found supine in bed w/ HOB elevated, in no apparent distress and responsive. In comparison to previous session, pt with no improvements as evidenced by pt oncompliant with WBS . Post session: pt returned back to recliner, chair alarm engaged, all needs in reach, and RN notified of session findings/recommendations. Continue to recommend Level I (Maximum Resource Intensity) at time of d/c in order to maximize pt's functional independence and safety w/ mobility. Pt continues to be functioning below baseline level. PT will continue to see pt during current hospitalization in order to address the deficits listed above and provide interventions consistent w/ POC in effort to achieve STGs.    Nai Lynn, REBEKAH   Barriers to Discharge Inaccessible home environment   Goals   LTG Expiration Date 07/04/25   Plan    Treatment/Interventions Functional transfer training;LE strengthening/ROM;Therapeutic exercise;Endurance training;Bed mobility;Gait training;Spoke to nursing   PT Frequency 3-5x/wk   Discharge Recommendation   Rehab Resource Intensity Level, PT I (Maximum Resource Intensity)   AM-PAC Basic Mobility Inpatient   Turning in Flat Bed Without Bedrails 3   Lying on Back to Sitting on Edge of Flat Bed Without Bedrails 3   Moving Bed to Chair 1   Standing Up From Chair Using Arms 1   Walk in Room 1   Climb 3-5 Stairs With Railing 1   Basic Mobility Inpatient Raw Score 10   Turning Head Towards Sound 4   Follow Simple Instructions 4   Low Function Basic Mobility Raw Score  18   Low Function Basic Mobility Standardized Score  29.25   [facility] Highest Level Of Mobility   -HL Goal 4: Move to chair/commode   -[facility] Achieved 4: Move to chair/commode   End of Consult   Patient Position at End of Consult Bedside chair;Bed/Chair alarm activated;All needs within reach       The patient's AM-PAC Basic Mobility Inpatient Short Form Raw Score is 10. A Raw score of less than or equal to 16 suggests the patient may benefit from discharge to post-acute rehabilitation services. Please also refer to the recommendation of the Physical Therapist for safe discharge planning.      Nai Lynn, PTA,PTA

## 2025-07-01 NOTE — ASSESSMENT & PLAN NOTE
Lab Results   Component Value Date    HGBA1C 6.6 (H) 06/18/2025       Recent Labs     06/30/25  1653 06/30/25  2119 07/01/25  0714 07/01/25  1149   POCGLU 226* 264* 164* 194*       Blood Sugar Average: Last 72 hrs:  (P) [id number]  Hold prehospital metformin and glipizide while inpatient resume on discharge  Blood sugar results reviewed  Continue sliding scale insulin coverage ACHS  Continue 5 units of Lantus at bedtime  Continue novolog 3 units with meals  CHO diet  Hypoglycemia protocol  Continue to monitor blood sugars and adjust regimen accordingly  BMP a.m.

## 2025-07-01 NOTE — ASSESSMENT & PLAN NOTE
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 No definite MRI evidence of osteomyelitis. The ulcer is also in close approximation to the distal third metatarsal, and early osteomyelitis in that location cannot be excluded. No evidence of an abscess  S/p R TMA 6/23/2025  Wound culture with MSSA, Pseudomonas and Serratia  ID following, discontinued vancomycin and Zosyn 6/26 and started cefepime.    TTE without obvious signs of vegetation  6/26 follow-up blood cultures x 2 results negative after 4 days.    PICC line placement 6/30 by ID.    Plan is for 6 weeks of IV antibiotics on discharge per ID recs  Medically stable for discharge when arrangements finalized by case management

## 2025-07-01 NOTE — ASSESSMENT & PLAN NOTE
Lab Results   Component Value Date    EGFR 86 07/01/2025    EGFR 84 06/30/2025    EGFR 82 06/29/2025    CREATININE 0.77 07/01/2025    CREATININE 0.81 06/30/2025    CREATININE 0.85 06/29/2025     POA with creatinine 1.49  Likely prerenal in setting of shock state which is now resolved  Creatinine currently at baseline, euvolemic on exam  Avoid nephrotoxins and hypotension  GERDA resolved

## 2025-07-01 NOTE — ASSESSMENT & PLAN NOTE
Noted on MRI imaging  Patient follows with podiatry outpatient for chronic wounds, podiatry continues to follow inpatient  1 of 2 blood cultures on admission with MSSA  S/p right TMA on 6/23/25  Operative cultures with MSSA, Pseudomonas and Serratia  Appreciate ID who are following-vancomycin and Zosyn discontinued 6/26, initiated cefepime   6/26 cultures x 2 negative after 4 days  See plan for MSSA bacteremia  Dressings per podiatry

## 2025-07-01 NOTE — PROGRESS NOTES
Progress Note - Hospitalist   Name: Gold Van 79 y.o. male I MRN: [id number]  Unit/Bed#: -01 I Date of Admission: 6/17/2025   Date of Service: 7/1/2025 I Hospital Day: 14    Assessment & Plan  Septic shock (HCC)  POA, shock criteria have since resolved  Source: RLE osteo  1 of 2 blood cultures with MSSA  Hx serratia/pseudomonas/e faecalis wound infection in past  MRI R foot-Postsurgical changes of partial fourth and fifth ray resections with a soft tissue ulcer overlying the cut surfaces of the fourth and fifth metatarsals and findings concerning for early osteomyelitis in the residual fourth metatarsal. No definite MRI evidence of osteomyelitis. The ulcer is also in close approximation to the distal third metatarsal, and early osteomyelitis in that location cannot be excluded. No evidence of an abscess  S/p R TMA 6/23/2025  Wound culture with MSSA, Pseudomonas and Serratia  ID following, discontinued vancomycin and Zosyn 6/26 and started cefepime.    TTE without obvious signs of vegetation  6/26 follow-up blood cultures x 2 results negative after 4 days.    PICC line placement 6/30 by ID.    Plan is for 6 weeks of IV antibiotics on discharge per ID recs  Medically stable for discharge when arrangements finalized by case management  Acute renal failure superimposed on stage 2 chronic kidney disease  ([facility])  Lab Results   Component Value Date    EGFR 86 07/01/2025    EGFR 84 06/30/2025    EGFR 82 06/29/2025    CREATININE 0.77 07/01/2025    CREATININE 0.81 06/30/2025    CREATININE 0.85 06/29/2025     POA with creatinine 1.49  Likely prerenal in setting of shock state which is now resolved  Creatinine currently at baseline, euvolemic on exam  Avoid nephrotoxins and hypotension  GERDA resolved  Type 2 diabetes mellitus with complication, without long-term current use of insulin ([facility])  Lab Results   Component Value Date    HGBA1C 6.6 (H) 06/18/2025       Recent Labs     06/30/25  1653 06/30/25  0696  07/01/25  0714 07/01/25  1149   POCGLU 226* 264* 164* 194*       Blood Sugar Average: Last 72 hrs:  (P) [id number]  Hold prehospital metformin and glipizide while inpatient resume on discharge  Blood sugar results reviewed  Continue sliding scale insulin coverage ACHS  Continue 5 units of Lantus at bedtime  Continue novolog 3 units with meals  CHO diet  Hypoglycemia protocol  Continue to monitor blood sugars and adjust regimen accordingly  BMP a.m.  Essential hypertension  Shock state resolved  Continue lisinopril, atenolol  Monitor BP per protocol  CVA (cerebrovascular accident) ([facility])  No residual deficits  Continue home statin and Plavix  PAF (paroxysmal atrial fibrillation) ([facility])  Resumed prehospital atenolol 6/25,  shock state resolved  Had episodes of atrial fibrillation with RVR 6/21 treated with 3 doses of metoprolol IV followed by Cardizem.  No further episodes  Continue prehospital Eliquis  Mixed hyperlipidemia  Continue home statin  Hyponatremia  Chronically 130-134  Currently at baseline  Euvolemic on exam  Continue to trend, BMP a.m.  Pruritic condition  Continue home prednisone  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[facility])   Without exacerbation  Continue prehospital inhalers  Gastroesophageal reflux disease without esophagitis  Continue home PPI  Severe peripheral arterial disease ([facility])  Hx R SFA stent 5/13  Continue prehospital Plavix  Anemia  Baseline hemoglobin appears to be 8.5-9.5  Received 1 UPRBC 6/18 and 6/23  Hemoglobin better than baseline today  CBC a.m.  Pressure injury of skin of sacral region  Continue wound care per protocol  Ambulatory dysfunction  RLE NWB  PT OT-recommend rehab placement  Case management is making rehab referrals-PICC line in place   Will have 6 weeks IV ABT on discharge  Medically stable for discharge when arrangements finalized by case management  Moderate protein-calorie malnutrition (HCC)  Malnutrition Findings:        Nutrition following  Monitor appetites                        BMI Findings:           Body mass index is 18.58 kg/m².     MSSA bacteremia  Appreciate ID who are following  1/2 blood culture on admission with MSSA bacteremia  Patient was noted to have MSSA and soft tissue culture with MSSA, Pseudomonas and Serratia  TTE without signs of obvious vegetation  Appreciate ID who are following   6/26 IV Zosyn and vancomycin were discontinued by ID.  Patient was initiated on cefepime 2 g IV every 12 hours dosed for renal function-will continue  6/26 blood cultures x 2 negative after 4 days  6/30 PICC placed BY IR  Medically stable for dc to rehab facility when arrangements finalized by case management on 6 weeks IV ABT per orders of ID  Osteomyelitis of right foot (HCC)  Noted on MRI imaging  Patient follows with podiatry outpatient for chronic wounds, podiatry continues to follow inpatient  1 of 2 blood cultures on admission with MSSA  S/p right TMA on 6/23/25  Operative cultures with MSSA, Pseudomonas and Serratia  Appreciate ID who are following-vancomycin and Zosyn discontinued 6/26, initiated cefepime   6/26 cultures x 2 negative after 4 days  See plan for MSSA bacteremia  Dressings per podiatry  Allergy to antibiotic  Appreciate ID who are following for MSSA bacteremia  Avoid ciprofloxacin and Bactrim as patient reports shortness of breath with these antibiotics    VTE Pharmacologic Prophylaxis: VTE Score: 3 Moderate Risk (Score 3-4) - Pharmacological DVT Prophylaxis Ordered: apixaban (Eliquis).    Mobility:   Basic Mobility Inpatient Raw Score: 10  JH-HLM Goal: 4: Move to chair/commode  JH-HLM Achieved: 4: Move to chair/commode  JH-HLM Goal achieved. Continue to encourage appropriate mobility.    Patient Centered Rounds: I performed bedside rounds with nursing staff today.   Discussions with Specialists or Other Care Team Provider:  nursing, case management    Education and Discussions with Family / Patient: Updated  (wife) at bedside.    Current Length of  Stay: 14 day(s)  Current Patient Status: Inpatient   Certification Statement: The patient will continue to require additional inpatient hospital stay [facility]  Discharge Plan: Medically stable for discharge when arrangements finalized by case management    Code Status: Level 1 - Full Code    Subjective   Denies any dizziness, lightness, chest pain or palpitations.  Pleasant, verbalizes needs.    Objective :  Temp:  [97.7 °F (36.5 °C)-98.6 °F (37 °C)] 97.7 °F (36.5 °C)  HR:  [73-77] 73  BP: (106-135)/(60-66) 106/61  Resp:  [16-18] 18  SpO2:  [95 %-98 %] 98 %  O2 Device: None (Room air)    Body mass index is 18.58 kg/m².     Input and Output Summary (last 24 hours):     Intake/Output Summary (Last 24 hours) at 7/1/2025 1602  Last data filed at 7/1/2025 0429  Gross per 24 hour   Intake --   Output 725 ml   Net -725 ml       Physical Exam  Vitals reviewed.   Constitutional:       General: He is not in acute distress.     Appearance: He is well-developed.   HENT:      Head: Normocephalic and atraumatic.     Cardiovascular:      Rate and Rhythm: Normal rate and regular rhythm.      Heart sounds: No murmur heard.  Pulmonary:      Effort: Pulmonary effort is normal. No respiratory distress.      Breath sounds: Normal breath sounds. No wheezing or rales.   Abdominal:      General: There is no distension.      Palpations: Abdomen is soft.      Tenderness: There is no abdominal tenderness. There is no guarding.     Musculoskeletal:         General: No swelling.     Skin:     General: Skin is warm and dry.      Capillary Refill: Capillary refill takes less than 2 seconds.      Findings: Erythema present.      Comments: Sacral erythema   right foot dressing intact     Neurological:      General: No focal deficit present.      Mental Status: He is alert and oriented to person, place, and time. Mental status is at baseline.     Psychiatric:         Mood and Affect: Mood normal.         Behavior: Behavior  normal.         Thought Content: Thought content normal.         Judgment: Judgment normal.           Lines/Drains:  Lines/Drains/Airways       Active Status       Name Placement date Placement time Site Days    PICC Line 06/30/25 Right Basilic 06/30/25  1541  Basilic  1                            Lab Results: I have reviewed the following results:   Results from last 7 days   Lab Units 07/01/25  0439 06/30/25  0452   WBC Thousand/uL 6.42 6.82   HEMOGLOBIN g/dL 9.8* 9.8*   HEMATOCRIT % 31.0* 32.1*   PLATELETS Thousands/uL 376 378   SEGS PCT %  --  59   LYMPHO PCT %  --  26   MONO PCT %  --  10   EOS PCT %  --  3     Results from last 7 days   Lab Units 07/01/25  0439   SODIUM mmol/L 132*   POTASSIUM mmol/L 4.2   CHLORIDE mmol/L 100   CO2 mmol/L 25   BUN mg/dL 29*   CREATININE mg/dL 0.77   ANION GAP mmol/L 7   CALCIUM mg/dL 9.1   GLUCOSE RANDOM mg/dL 183*         Results from last 7 days   Lab Units 07/01/25  1149 07/01/25  0714 06/30/25  2119 06/30/25  1653 06/30/25  1116 06/30/25  0721 06/29/25  2018 06/29/25  1630 06/29/25  1112 06/29/25  0735 06/28/25  2108 06/28/25  1642   POC GLUCOSE mg/dl 194* 164* 264* 226* 203* 164* 226* 242* 179* 169* 261* 309*                 Recent Cultures (last 7 days):   Results from last 7 days   Lab Units 06/26/25  1123   BLOOD CULTURE  No Growth After 5 Days.  No Growth After 5 Days.       Imaging Results Review: I reviewed radiology reports from this admission including: MRI right foot, right foot x-ray.  Other Study Results Review: No additional pertinent studies reviewed.    Last 24 Hours Medication List:     Current Facility-Administered Medications:     acetaminophen (TYLENOL) tablet 650 mg, Q6H PRN    albuterol (PROVENTIL HFA,VENTOLIN HFA) inhaler 2 puff, Q4H PRN    albuterol inhalation solution 2.5 mg, Q6H PRN    apixaban (ELIQUIS) tablet 5 mg, BID    atenolol (TENORMIN) tablet 25 mg, Daily    atorvastatin (LIPITOR) tablet 40 mg, QPM    Budeson-Glycopyrrol-Formoterol  160-9-4.8 MCG/ACT AERO 2 puff, Q12H SANDRA    ceFEPime (MAXIPIME) 2,000 mg in dextrose 5 % 50 mL IVPB, Q8H, Last Rate: 2,000 mg (07/01/25 1129)    clopidogrel (PLAVIX) tablet 75 mg, Daily    diltiazem (CARDIZEM) injection 15 mg, Once    diphenhydrAMINE (BENADRYL) injection 25 mg, Q6H PRN    famotidine (PEPCID) tablet 20 mg, Daily    heparin (porcine) injection 1,800 Units, Q6H PRN    heparin (porcine) injection 3,600 Units, Q6H PRN    HYDROmorphone HCl (DILAUDID) injection 0.2 mg, Q2H PRN    insulin glargine (LANTUS) subcutaneous injection 10 Units 0.1 mL, HS    insulin lispro (HumALOG/ADMELOG) 100 units/mL subcutaneous injection 1-5 Units, TID AC **AND** Fingerstick Glucose (POCT), TID AC    insulin lispro (HumALOG/ADMELOG) 100 units/mL subcutaneous injection 1-5 Units, HS    insulin lispro (HumALOG/ADMELOG) 100 units/mL subcutaneous injection 3 Units, TID With Meals    lisinopril (ZESTRIL) tablet 5 mg, Daily    moisture barrier miconazole 2% cream (aka CALEB MOISTURE BARRIER ANTIFUNGAL CREAM), BID    montelukast (SINGULAIR) tablet 10 mg, HS    multivitamin-minerals (CENTRUM) tablet 1 tablet, Daily    naloxone (NARCAN) 0.04 mg/mL syringe 0.04 mg, Q1MIN PRN    ondansetron (ZOFRAN) injection 4 mg, Q6H PRN    oxyCODONE (ROXICODONE) split tablet 2.5 mg, Q4H PRN **OR** oxyCODONE (ROXICODONE) IR tablet 5 mg, Q4H PRN    polyethylene glycol (MIRALAX) packet 17 g, Daily PRN    predniSONE tablet 5 mg, Daily    senna-docusate sodium (SENOKOT S) 8.6-50 mg per tablet 2 tablet, HS    Administrative Statements   Today, Patient Was Seen By: BELGICA Nelson  I have spent a total time of 36 minutes in caring for this patient on the day of the visit/encounter including Patient and family education, Counseling / Coordination of care, Documenting in the medical record, Reviewing/placing orders in the medical record (including tests, medications, and/or procedures), Obtaining or reviewing history  , and Communicating with other  healthcare professionals .    **Please Note: This note may have been constructed using a voice recognition system.**

## 2025-07-01 NOTE — ASSESSMENT & PLAN NOTE
Lab Results   Component Value Date    EGFR 86 07/01/2025    EGFR 84 06/30/2025    EGFR 82 06/29/2025    CREATININE 0.77 07/01/2025    CREATININE 0.81 06/30/2025    CREATININE 0.85 06/29/2025   This impacts antibiotic dosing. Upon review of patient's medical records it appears his baseline creatinine is approximately 0.7-0.9. Creatinine was 1.49 upon admission. Creatinine is now improved, was 0.77 this morning.  -monitor creatinine  -dose adjust antibiotic for renal function as needed  -avoid nephrotoxins  -volume management per primary service

## 2025-07-01 NOTE — CASE MANAGEMENT
Case Management Discharge Planning Note    Patient name Gold Van  Location /-01 MRN [id number]  : 1945 Date 2025       Current Admission Date: 2025  Current Admission Diagnosis:Septic shock ([facility])   Patient Active Problem List    Diagnosis Date Noted    MSSA bacteremia 2025    Osteomyelitis of right foot ([facility]) 2025    Allergy to antibiotic 2025    Pressure injury of skin of sacral region 2025    Metabolic acidosis 2025    Ambulatory dysfunction 2025    Osteomyelitis ([facility]) 2025    Anemia 2025    Pulmonary hypertension ([facility]) 2025    Septic shock ([facility]) 2025    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 ([facility]) 2025    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 ([facility]) 2025    PAF (paroxysmal atrial fibrillation) ([facility]) 2025    Chronic heart failure with preserved ejection fraction (HFpEF) ([facility]) 2025    Ulcer of right foot with fat layer exposed secondary to osteomyelitis 2025    Severe peripheral arterial disease ([facility]) 2024    Moderate protein-calorie malnutrition ([facility]) 10/09/2024    Gastroesophageal reflux disease without esophagitis 10/04/2024    Neuropathy 10/04/2024    Foot drop, left 10/04/2024    CVA (cerebrovascular accident) ([facility]) 10/02/2024    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[facility]) 2024    History of pneumothorax 2024    Non-recurrent bilateral inguinal hernia without obstruction or gangrene 2024    Pruritic condition 2024    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 2021    Acute renal failure superimposed on stage 2 chronic kidney disease  ([facility]) 2021    Hyponatremia 2021    Lung nodule 2021    Type 2 diabetes mellitus with complication, without long-term current use of insulin ([facility]) 10/23/2017    Essential hypertension 10/23/2017    Mild intermittent asthma without complication  10/23/2017    Mixed hyperlipidemia 10/23/2017      LOS (days): 14  Geometric Mean LOS (GMLOS) (days): 4.9  Days to GMLOS:-9.3     OBJECTIVE:  Risk of Unplanned Readmission Score: 38.78         Current admission status: Inpatient   Preferred Pharmacy:   [facility] [place] - [address]  [address] [facility] [address]  Phone: [phone] Fax: [phone]    [facility] - [place] - [address]  [address] [facility] [address] [address]  Phone: [phone] Fax: [phone]    [facility] ([facility]) #[id number] [place], [place] - [address] [address]  Phone: [phone] Fax: [phone]    Primary Care Provider: Shayne Diaz MD    Primary Insurance: [facility] REP  Secondary Insurance:     DISCHARGE DETAILS:       Freedom of Choice: Yes  Comments - Freedom of Choice: recommendation is for rehab -  pt was accepted to [facility]- cm sent for auth- snf referrals have been sent if auth is not obtianed                               Other Referral/Resources/Interventions Provided:  Interventions: Acute Rehab, Short Term Rehab  Referral Comments: [facility] sent for auth - snf referrals sent    Would you like to participate in [facility]?  : No - Declined    Treatment Team Recommendation: Short Term Rehab, Acute Rehab ([facility] vs snf auht needed - TBD)

## 2025-07-01 NOTE — PLAN OF CARE
Problem: Prexisting or High Potential for Compromised Skin Integrity  Goal: Skin integrity is maintained or improved  Description: INTERVENTIONS:  - Identify patients at risk for skin breakdown  - Assess and monitor skin integrity including under and around medical devices   - Assess and monitor nutrition and hydration status  - Monitor labs  - Assess for incontinence   - Turn and reposition patient  - Assist with mobility/ambulation  - Relieve pressure over joycelyn prominences   - Avoid friction and shearing  - Provide appropriate hygiene as needed including keeping skin clean and dry  - Evaluate need for skin moisturizer/barrier cream  - Collaborate with interdisciplinary team  - Patient/family teaching  - Consider wound care consult    Assess:  - Review Sae scale daily  - Clean and moisturize skin every shift  - Inspect skin when repositioning, toileting, and assisting with ADLS  - Assess under medical devices   - Assess extremities for adequate circulation and sensation     Bed Management:  - Have minimal linens on bed & keep smooth, unwrinkled  - Change linens as needed when moist or perspiring  - Avoid sitting or lying in one position for more than 2 hours while in bed?Keep HOB at 30 degrees   - Toileting:  - Offer bedside commode  - Assess for incontinence   - Use incontinent care products after each incontinent episode     Activity:  - Mobilize patient 3 times a day  - Encourage activity and walks on unit  - Encourage or provide ROM exercises   - Turn and reposition patient every 2 Hours  - Use appropriate equipment to lift or move patient in bed  - Instruct/ Assist with weight shifting every 60 when out of bed in chair  - Consider limitation of chair time 2 hour intervals    Skin Care:  - Avoid use of baby powder, tape, friction and shearing, hot water or constrictive clothing  - Relieve pressure over bony prominences using mepelex  - Do not massage red bony areas    Next Steps:  - Teach patient  strategies to minimize risks  - Consider consults to  interdisciplinary teams   Outcome: Progressing     Problem: PAIN - ADULT  Goal: Verbalizes/displays adequate comfort level or baseline comfort level  Description: Interventions:  - Encourage patient to monitor pain and request assistance  - Assess pain using appropriate pain scale  - Administer analgesics as ordered based on type and severity of pain and evaluate response  - Implement non-pharmacological measures as appropriate and evaluate response  - Consider cultural and social influences on pain and pain management  - Notify physician/advanced practitioner if interventions unsuccessful or patient reports new pain  - Educate patient/family on pain management process including their role and importance of  reporting pain   - Provide non-pharmacologic/complimentary pain relief interventions  Outcome: Progressing

## 2025-07-01 NOTE — ASSESSMENT & PLAN NOTE
Likely secondary to R foot osteomyelitis. Patient's admission blood cultures with growth of MSSA in one. MSSA also present in R foot wound culture. He has no implanted devices or surgical hardware. TTE was a technically difficulty study but showed no valvular vegetations. Will not pursue DENISE at this time given clear source and given that patient will require prolonged antibiotic for foot treatment anyway. Patient has been transition to IV Cefepime for coverage of both his bacteremia and foot infection. He is tolerating antibiotic without difficulty. RUE PICC has been placed. Repeat blood cultures are negative >4 days.    -continue IV cefepime but increased dosing to 2g q8 for improved renal function, through 8/6/2025 for 6 weeks of IV antibiotic after cleared blood cultures and in setting of R foot osteomyelitis  -weekly CBCD and creatinine while on IV antibiotic  -follow up repeat blood cultures  -monitor vitals  -PICC to be removed by RN after final dose of IV antibiotic on 8/6/2025  -patient to follow up in the ID office after discharge, appointment information is in the discharge planning

## 2025-07-01 NOTE — ASSESSMENT & PLAN NOTE
Appreciate ID who are following  1/2 blood culture on admission with MSSA bacteremia  Patient was noted to have MSSA and soft tissue culture with MSSA, Pseudomonas and Serratia  TTE without signs of obvious vegetation  Appreciate ID who are following   6/26 IV Zosyn and vancomycin were discontinued by ID.  Patient was initiated on cefepime 2 g IV every 12 hours dosed for renal function-will continue  6/26 blood cultures x 2 negative after 4 days  6/30 PICC placed BY IR  Medically stable for dc to rehab facility when arrangements finalized by case management on 6 weeks IV ABT per orders of ID

## 2025-07-01 NOTE — NURSING NOTE
PICC line dressing changed due to small bloody output around insertion site. Small blood spots noted on patients sheet. Patient refusing for this RN to change sheets at this time and requesting we wait until the morning. PICC line dressing clean, dry, and intact at this time.

## 2025-07-01 NOTE — NURSING NOTE
Patient refusing wedges for repositioning at this time. Patient reminded that it is important for pressure distribution, but he feels as if they are not helping him. Primary RN notified.

## 2025-07-01 NOTE — DISCHARGE SUPPORT
Case Management Assessment & Discharge Planning Note    Patient name Gold Van  Location /-01 MRN [id number]  : 1945 Date 2025       Current Admission Date: 2025  Current Admission Diagnosis:Septic shock ([facility])   Patient Active Problem List    Diagnosis Date Noted    MSSA bacteremia 2025    Osteomyelitis of right foot ([facility]) 2025    Allergy to antibiotic 2025    Pressure injury of skin of sacral region 2025    Metabolic acidosis 2025    Ambulatory dysfunction 2025    Osteomyelitis ([facility]) 2025    Anemia 2025    Pulmonary hypertension ([facility]) 2025    Septic shock ([facility]) 2025    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 ([facility]) 2025    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 ([facility]) 2025    PAF (paroxysmal atrial fibrillation) ([facility]) 2025    Chronic heart failure with preserved ejection fraction (HFpEF) ([facility]) 2025    Ulcer of right foot with fat layer exposed secondary to osteomyelitis 2025    Severe peripheral arterial disease ([facility]) 2024    Moderate protein-calorie malnutrition ([facility]) 10/09/2024    Gastroesophageal reflux disease without esophagitis 10/04/2024    Neuropathy 10/04/2024    Foot drop, left 10/04/2024    CVA (cerebrovascular accident) ([facility]) 10/02/2024    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    History of pneumothorax 2024    Non-recurrent bilateral inguinal hernia without obstruction or gangrene 2024    Pruritic condition 2024    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 2021    Acute renal failure superimposed on stage 2 chronic kidney disease  ([facility]) 2021    Hyponatremia 2021    Lung nodule 2021    Type 2 diabetes mellitus with complication, without long-term current use of insulin ([facility]) 10/23/2017    Essential hypertension 10/23/2017    Mild intermittent asthma  without complication 10/23/2017    Mixed hyperlipidemia 10/23/2017      LOS (days): 14  Geometric Mean LOS (GMLOS) (days): 4.9  Days to GMLOS:-9.1   Facility Authorization Initiated  [facility] received request for auth from : Johanny WEEKS  Authorization Request Submitted for: Acute Rehab  Requested Start of Care Date: 07/01/25  Facility Name: [facility]  Facility NPI: [id number]  Facility MD: Ashley DePadua  [facility] MD NPI: [id number]  Authorization initiated by contacting insurance: [employer]  Via: [employer]  Clinicals submitted via: Portal Attachment  Pending reference #: [id number]   notified: Johanny WEEKS     Updates to authorization status will be noted in chart.    Please reach out to CM for updates on any clinical information.

## 2025-07-01 NOTE — ASSESSMENT & PLAN NOTE
Leukocytosis, tachycardia, tachycardia, lactic acidosis, and hypotension requiring temporary pressor support. 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 No other clear source appreciated. The patient has clinically improved and no longer requires pressor support. He has transitioned out of ICU to [facility]-surg care. Patient's remained afebrile. His WBC count has trended down. Repeat blood cultures are negative >4 days.   -antibiotic as below  -check CBCD and BMP tomorrow  -follow up repeat blood cultures  -monitor vitals  -supportive care

## 2025-07-01 NOTE — ASSESSMENT & PLAN NOTE
RLE NWB  PT OT-recommend rehab placement  Case management is making rehab referrals-PICC line in place   Will have 6 weeks IV ABT on discharge  Medically stable for discharge when arrangements finalized by case management

## 2025-07-01 NOTE — DISCHARGE SUPPORT
Case Management Assessment & Discharge Planning Note    Patient name Gold Van  Location /-01 MRN [id number]  : 1945 Date 2025       Current Admission Date: 2025  Current Admission Diagnosis:Septic shock ([facility])   Patient Active Problem List    Diagnosis Date Noted    MSSA bacteremia 2025    Osteomyelitis of right foot ([facility]) 2025    Allergy to antibiotic 2025    Pressure injury of skin of sacral region 2025    Metabolic acidosis 2025    Ambulatory dysfunction 2025    Osteomyelitis ([facility]) 2025    Anemia 2025    Pulmonary hypertension ([facility]) 2025    Septic shock ([facility]) 2025    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 ([facility]) 2025    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 ([facility]) 2025    PAF (paroxysmal atrial fibrillation) ([facility]) 2025    Chronic heart failure with preserved ejection fraction (HFpEF) ([facility]) 2025    Ulcer of right foot with fat layer exposed secondary to osteomyelitis 2025    Severe peripheral arterial disease ([facility]) 2024    Moderate protein-calorie malnutrition ([facility]) 10/09/2024    Gastroesophageal reflux disease without esophagitis 10/04/2024    Neuropathy 10/04/2024    Foot drop, left 10/04/2024    CVA (cerebrovascular accident) ([facility]) 10/02/2024    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    History of pneumothorax 2024    Non-recurrent bilateral inguinal hernia without obstruction or gangrene 2024    Pruritic condition 2024    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 2021    Acute renal failure superimposed on stage 2 chronic kidney disease  ([facility]) 2021    Hyponatremia 2021    Lung nodule 2021    Type 2 diabetes mellitus with complication, without long-term current use of insulin ([facility]) 10/23/2017    Essential hypertension 10/23/2017    Mild intermittent asthma  without complication 10/23/2017    Mixed hyperlipidemia 10/23/2017      LOS (days): 14  Geometric Mean LOS (GMLOS) (days): 4.9  Days to GMLOS:-9.2   Facility Auth Adverse Decision  DC Support Center has received: Denial  For Level of Care: Acute Rehab  Insurance: [facility]  Information obtained via : Phone  Name/Phone # of Rep who called in information: Lizzette  Reason for Adverse Decision: Does not meet criteria  Reference Number: [id number]  Facility Name: [facility]  Peer to Peer?: Offered  Peer to Peer Phone #: [phone]  P2P Deadline: 7/2 @ 4p  For P2P Completion:: CM to task P2P to attending to complete if desired  Other Notes: Courtesy SNF: [id number]   notified: Johanny WEEKS     Updates to authorization status will be noted in chart.    Please reach out to CM for updates on any clinical information.

## 2025-07-01 NOTE — PLAN OF CARE
Problem: PHYSICAL THERAPY ADULT  Goal: Performs mobility at highest level of function for planned discharge setting.  See evaluation for individualized goals.  Description: Treatment/Interventions: Functional transfer training, Therapeutic exercise, Endurance training, Gait training, Bed mobility, Equipment eval/education, Elevations  Equipment Recommended:  (TBD)       See flowsheet documentation for full assessment, interventions and recommendations.  Outcome: Not Progressing  Note: Prognosis: Fair  Problem List: Decreased strength, Decreased endurance, Impaired balance, Decreased mobility, Decreased skin integrity, Pain  Assessment: Pt seen for PT treatment session this date with interventions consisting of Therapeutic exercise consisting of: AROM 2 sets of 15 reps B LE in sitting position and therapeutic activity consisting of training: bed mobility, supine<>sit transfers, sit<>stand transfers, and stand pivot transfers towards L direction. Pt agreeable to PT treatment session upon arrival, pt found supine in bed w/ HOB elevated, in no apparent distress and responsive. In comparison to previous session, pt with no improvements as evidenced by pt oncompliant with WBS . Post session: pt returned back to recliner, chair alarm engaged, all needs in reach, and RN notified of session findings/recommendations. Continue to recommend Level I (Maximum Resource Intensity) at time of d/c in order to maximize pt's functional independence and safety w/ mobility. Pt continues to be functioning below baseline level. PT will continue to see pt during current hospitalization in order to address the deficits listed above and provide interventions consistent w/ POC in effort to achieve STGs.    Nai Lynn, PTA  Barriers to Discharge: Inaccessible home environment     Rehab Resource Intensity Level, PT: I (Maximum Resource Intensity)    See flowsheet documentation for full assessment.

## 2025-07-02 LAB
ANION GAP SERPL CALCULATED.3IONS-SCNC: 5 MMOL/L (ref 4–13)
BUN SERPL-MCNC: 30 MG/DL (ref 5–25)
CALCIUM SERPL-MCNC: 8.9 MG/DL (ref 8.4–10.2)
CHLORIDE SERPL-SCNC: 101 MMOL/L (ref 96–108)
CO2 SERPL-SCNC: 25 MMOL/L (ref 21–32)
CREAT SERPL-MCNC: 0.77 MG/DL (ref 0.6–1.3)
ERYTHROCYTE [DISTWIDTH] IN BLOOD BY AUTOMATED COUNT: 15.3 % (ref 11.6–15.1)
GFR SERPL CREATININE-BSD FRML MDRD: 86 ML/MIN/1.73SQ M
GLUCOSE SERPL-MCNC: 106 MG/DL (ref 65–140)
GLUCOSE SERPL-MCNC: 206 MG/DL (ref 65–140)
GLUCOSE SERPL-MCNC: 257 MG/DL (ref 65–140)
GLUCOSE SERPL-MCNC: 271 MG/DL (ref 65–140)
GLUCOSE SERPL-MCNC: 274 MG/DL (ref 65–140)
HCT VFR BLD AUTO: 30 % (ref 36.5–49.3)
HGB BLD-MCNC: 9.4 G/DL (ref 12–17)
MCH RBC QN AUTO: 27.8 PG (ref 26.8–34.3)
MCHC RBC AUTO-ENTMCNC: 31.3 G/DL (ref 31.4–37.4)
MCV RBC AUTO: 89 FL (ref 82–98)
PLATELET # BLD AUTO: 335 THOUSANDS/UL (ref 149–390)
PMV BLD AUTO: 9.2 FL (ref 8.9–12.7)
POTASSIUM SERPL-SCNC: 4.2 MMOL/L (ref 3.5–5.3)
RBC # BLD AUTO: 3.38 MILLION/UL (ref 3.88–5.62)
SODIUM SERPL-SCNC: 131 MMOL/L (ref 135–147)
WBC # BLD AUTO: 6.07 THOUSAND/UL (ref 4.31–10.16)

## 2025-07-02 PROCEDURE — 88311 DECALCIFY TISSUE: CPT | Performed by: STUDENT IN AN ORGANIZED HEALTH CARE EDUCATION/TRAINING PROGRAM

## 2025-07-02 PROCEDURE — 97110 THERAPEUTIC EXERCISES: CPT

## 2025-07-02 PROCEDURE — 88305 TISSUE EXAM BY PATHOLOGIST: CPT | Performed by: STUDENT IN AN ORGANIZED HEALTH CARE EDUCATION/TRAINING PROGRAM

## 2025-07-02 PROCEDURE — 80048 BASIC METABOLIC PNL TOTAL CA: CPT

## 2025-07-02 PROCEDURE — 97116 GAIT TRAINING THERAPY: CPT

## 2025-07-02 PROCEDURE — 85027 COMPLETE CBC AUTOMATED: CPT

## 2025-07-02 PROCEDURE — 82948 REAGENT STRIP/BLOOD GLUCOSE: CPT

## 2025-07-02 PROCEDURE — 97530 THERAPEUTIC ACTIVITIES: CPT

## 2025-07-02 PROCEDURE — 99232 SBSQ HOSP IP/OBS MODERATE 35: CPT

## 2025-07-02 RX ORDER — INSULIN GLARGINE 100 [IU]/ML
12 INJECTION, SOLUTION SUBCUTANEOUS
Status: DISCONTINUED | OUTPATIENT
Start: 2025-07-02 | End: 2025-07-05 | Stop reason: HOSPADM

## 2025-07-02 RX ORDER — INSULIN LISPRO 100 [IU]/ML
4 INJECTION, SOLUTION INTRAVENOUS; SUBCUTANEOUS
Status: DISCONTINUED | OUTPATIENT
Start: 2025-07-02 | End: 2025-07-05 | Stop reason: HOSPADM

## 2025-07-02 RX ADMIN — CEFEPIME 2000 MG: 2 INJECTION, POWDER, FOR SOLUTION INTRAVENOUS at 02:37

## 2025-07-02 RX ADMIN — INSULIN LISPRO 3 UNITS: 100 INJECTION, SOLUTION INTRAVENOUS; SUBCUTANEOUS at 08:05

## 2025-07-02 RX ADMIN — INSULIN LISPRO 1 UNITS: 100 INJECTION, SOLUTION INTRAVENOUS; SUBCUTANEOUS at 08:05

## 2025-07-02 RX ADMIN — ATENOLOL 25 MG: 25 TABLET ORAL at 08:06

## 2025-07-02 RX ADMIN — FAMOTIDINE 20 MG: 20 TABLET, FILM COATED ORAL at 08:06

## 2025-07-02 RX ADMIN — BUDESONIDE, GLYCOPYRROLATE, AND FORMOTEROL FUMARATE 2 PUFF: 160; 9; 4.8 AEROSOL, METERED RESPIRATORY (INHALATION) at 21:51

## 2025-07-02 RX ADMIN — Medication 1 TABLET: at 08:05

## 2025-07-02 RX ADMIN — PREDNISONE 5 MG: 5 TABLET ORAL at 08:06

## 2025-07-02 RX ADMIN — CEFEPIME 2000 MG: 2 INJECTION, POWDER, FOR SOLUTION INTRAVENOUS at 17:25

## 2025-07-02 RX ADMIN — INSULIN LISPRO 3 UNITS: 100 INJECTION, SOLUTION INTRAVENOUS; SUBCUTANEOUS at 12:08

## 2025-07-02 RX ADMIN — MICONAZOLE NITRATE: 20 CREAM TOPICAL at 17:23

## 2025-07-02 RX ADMIN — APIXABAN 5 MG: 5 TABLET, FILM COATED ORAL at 08:05

## 2025-07-02 RX ADMIN — BUDESONIDE, GLYCOPYRROLATE, AND FORMOTEROL FUMARATE 2 PUFF: 160; 9; 4.8 AEROSOL, METERED RESPIRATORY (INHALATION) at 08:06

## 2025-07-02 RX ADMIN — INSULIN LISPRO 3 UNITS: 100 INJECTION, SOLUTION INTRAVENOUS; SUBCUTANEOUS at 12:00

## 2025-07-02 RX ADMIN — ATORVASTATIN CALCIUM 40 MG: 40 TABLET, FILM COATED ORAL at 17:21

## 2025-07-02 RX ADMIN — MONTELUKAST 10 MG: 10 TABLET, FILM COATED ORAL at 21:52

## 2025-07-02 RX ADMIN — LISINOPRIL 5 MG: 5 TABLET ORAL at 08:06

## 2025-07-02 RX ADMIN — CEFEPIME 2000 MG: 2 INJECTION, POWDER, FOR SOLUTION INTRAVENOUS at 10:26

## 2025-07-02 RX ADMIN — MICONAZOLE NITRATE: 20 CREAM TOPICAL at 08:06

## 2025-07-02 RX ADMIN — APIXABAN 5 MG: 5 TABLET, FILM COATED ORAL at 17:21

## 2025-07-02 RX ADMIN — INSULIN LISPRO 2 UNITS: 100 INJECTION, SOLUTION INTRAVENOUS; SUBCUTANEOUS at 21:52

## 2025-07-02 RX ADMIN — INSULIN LISPRO 4 UNITS: 100 INJECTION, SOLUTION INTRAVENOUS; SUBCUTANEOUS at 17:21

## 2025-07-02 RX ADMIN — CLOPIDOGREL 75 MG: 75 TABLET ORAL at 08:05

## 2025-07-02 RX ADMIN — INSULIN GLARGINE 12 UNITS: 100 INJECTION, SOLUTION SUBCUTANEOUS at 21:51

## 2025-07-02 NOTE — ASSESSMENT & PLAN NOTE
Lab Results   Component Value Date    EGFR 86 07/02/2025    EGFR 86 07/01/2025    EGFR 84 06/30/2025    CREATININE 0.77 07/02/2025    CREATININE 0.77 07/01/2025    CREATININE 0.81 06/30/2025     POA with creatinine 1.49  Likely prerenal in setting of shock state which is now resolved  Creatinine currently at baseline, euvolemic on exam  Avoid nephrotoxins and hypotension  GERDA resolved

## 2025-07-02 NOTE — PLAN OF CARE
Problem: PHYSICAL THERAPY ADULT  Goal: Performs mobility at highest level of function for planned discharge setting.  See evaluation for individualized goals.  Description: Treatment/Interventions: Functional transfer training, Therapeutic exercise, Endurance training, Gait training, Bed mobility, Equipment eval/education, Elevations  Equipment Recommended:  (TBD)       See flowsheet documentation for full assessment, interventions and recommendations.  Outcome: Not Progressing  Note: Prognosis: Fair  Problem List: Decreased strength, Decreased endurance, Impaired balance, Decreased mobility, Decreased skin integrity, Pain  Assessment: Pt seen for PT treatment session this date with interventions consisting of gait training w/ emphasis on improving pt's ability to ambulate level surfaces x 2-3 hops with mod A of 2 provided by therapist with RW, Therapeutic exercise consisting of: AROM 2 sets of 15 reps B LE in sitting position, and therapeutic activity consisting of training: bed mobility, supine<>sit transfers, sit<>stand transfers, stand pivot transfers towards B direction, and commode transfer . Pt agreeable to PT treatment session upon arrival, pt found supine in bed w/ HOB elevated, in no apparent distress and responsive. In comparison to previous session, pt with no improvements as evidenced by fair compliance with NWB status . Post session: pt returned back to recliner, chair alarm engaged, all needs in reach, and RN notified of session findings/recommendations. Continue to recommend Level I (Maximum Resource Intensity) at time of d/c in order to maximize pt's functional independence and safety w/ mobility. Pt continues to be functioning below baseline level. PT will continue to see pt during current hospitalization in order to address the deficits listed above and provide interventions consistent w/ POC in effort to achieve STGs.    Nai Lynn, PTA  Barriers to Discharge: Inaccessible home  environment     Rehab Resource Intensity Level, PT: I (Maximum Resource Intensity)    See flowsheet documentation for full assessment.

## 2025-07-02 NOTE — ASSESSMENT & PLAN NOTE
RLE NWB  PT OT-recommend rehab placement  Case management is making rehab referrals-PICC line in place   Will have 6 weeks IV ABT on discharge  Medically stable for discharge when arrangements finalized by case management  Patient was declined by [facility] by his insurance-I called to do a peer to peer review and awaiting a return call

## 2025-07-02 NOTE — ASSESSMENT & PLAN NOTE
Lab Results   Component Value Date    HGBA1C 6.6 (H) 06/18/2025       Recent Labs     07/01/25  1635 07/01/25  2157 07/02/25  0734 07/02/25  1059   POCGLU 248* 229* 206* 274*       Blood Sugar Average: Last 72 hrs:  (P) [id number]  Hold prehospital metformin and glipizide while inpatient resume on discharge  Blood sugar results reviewed  Continue sliding scale insulin coverage ACHS  Increase Lantus to 12 units at bedtime  Increase NovoLog from 3 units to 4 units with meals  CHO diet  Hypoglycemia protocol  Continue to monitor blood sugars and adjust regimen accordingly  BMP a.m.

## 2025-07-02 NOTE — QUICK NOTE
Did peer to peer review.  Declined for [facility] as patient does not have the ability to do 3 to 4 hours of PT OT required today, also due to not having ongoing medical conditions that require an MD to see him 3 times a week.  Patient and family understand.  Case management is working on SNF placement at this time.

## 2025-07-02 NOTE — PROGRESS NOTES
Progress Note - Hospitalist   Name: Gold Van 79 y.o. male I MRN: [id number]  Unit/Bed#: -01 I Date of Admission: 6/17/2025   Date of Service: 7/2/2025 I Hospital Day: 15    Assessment & Plan  Septic shock (HCC)  POA, shock criteria have since resolved  Source: RLE osteo  1 of 2 blood cultures with MSSA  Hx serratia/pseudomonas/e faecalis wound infection in past  MRI R foot-Postsurgical changes of partial fourth and fifth ray resections with a soft tissue ulcer overlying the cut surfaces of the fourth and fifth metatarsals and findings concerning for early osteomyelitis in the residual fourth metatarsal. No definite MRI evidence of osteomyelitis. The ulcer is also in close approximation to the distal third metatarsal, and early osteomyelitis in that location cannot be excluded. No evidence of an abscess  S/p R TMA 6/23/2025  Wound culture with MSSA, Pseudomonas and Serratia  ID following, discontinued vancomycin and Zosyn 6/26 and started cefepime.    TTE without obvious signs of vegetation  6/26 follow-up blood cultures x 2 results negative after 5 days.    PICC line placement 6/30 by ID.    Plan is for 6 weeks of IV antibiotics on discharge per ID recs  Medically stable for discharge when arrangements finalized by case management-patient was declined by [facility].  I have placed a call to his insurance company to do a peer to peer review awaiting return call  Acute renal failure superimposed on stage 2 chronic kidney disease  ([facility])  Lab Results   Component Value Date    EGFR 86 07/02/2025    EGFR 86 07/01/2025    EGFR 84 06/30/2025    CREATININE 0.77 07/02/2025    CREATININE 0.77 07/01/2025    CREATININE 0.81 06/30/2025     POA with creatinine 1.49  Likely prerenal in setting of shock state which is now resolved  Creatinine currently at baseline, euvolemic on exam  Avoid nephrotoxins and hypotension  GERDA resolved  Type 2 diabetes mellitus with complication, without long-term current use of insulin ([facility])  Lab  Results   Component Value Date    HGBA1C 6.6 (H) 06/18/2025       Recent Labs     07/01/25  1635 07/01/25  2157 07/02/25  0734 07/02/25  1059   POCGLU 248* 229* 206* 274*       Blood Sugar Average: Last 72 hrs:  (P) [id number]  Hold prehospital metformin and glipizide while inpatient resume on discharge  Blood sugar results reviewed  Continue sliding scale insulin coverage ACHS  Increase Lantus to 12 units at bedtime  Increase NovoLog from 3 units to 4 units with meals  CHO diet  Hypoglycemia protocol  Continue to monitor blood sugars and adjust regimen accordingly  BMP a.m.  Essential hypertension  Shock state resolved  Continue lisinopril, atenolol  Monitor BP per protocol  CVA (cerebrovascular accident) ([facility])  No residual deficits  Continue home statin and Plavix  PAF (paroxysmal atrial fibrillation) ([facility])  Resumed prehospital atenolol 6/25,  shock state resolved  Had episodes of atrial fibrillation with RVR 6/21 treated with 3 doses of metoprolol IV followed by Cardizem.  No further episodes  Continue prehospital Eliquis  Mixed hyperlipidemia  Continue home statin  Hyponatremia  Chronically 130-134  Currently at baseline  Euvolemic on exam  Continue to trend, BMP a.m.  Pruritic condition  Continue home prednisone  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[facility])   Without exacerbation  Continue prehospital inhalers  Gastroesophageal reflux disease without esophagitis  Continue home PPI  Severe peripheral arterial disease ([facility])  Hx R SFA stent 5/13  Continue prehospital Plavix  Anemia  Baseline hemoglobin appears to be 8.5-9.5  Received 1 UPRBC 6/18 and 6/23  Hemoglobin better than baseline today  CBC a.m.  Pressure injury of skin of sacral region  Continue wound care per protocol  Ambulatory dysfunction  RLE NWB  PT OT-recommend rehab placement  Case management is making rehab referrals-PICC line in place   Will have 6 weeks IV ABT on discharge  Medically stable for discharge when arrangements finalized by case  management  Patient was declined by [facility] by his insurance-I called to do a peer to peer review and awaiting a return call  Moderate protein-calorie malnutrition (HCC)  Malnutrition Findings:        Nutrition following  Monitor appetites                       BMI Findings:           Body mass index is 18.58 kg/m².     MSSA bacteremia  Appreciate ID who are following  1/2 blood culture on admission with MSSA bacteremia  Patient was noted to have MSSA and soft tissue culture with MSSA, Pseudomonas and Serratia  TTE without signs of obvious vegetation  Appreciate ID who are following   6/26 IV Zosyn and vancomycin were discontinued by ID.  Patient was initiated on cefepime 2 g IV every 12 hours dosed for renal function-will continue  6/26 blood cultures x 2 negative after 4 days  6/30 PICC placed BY IR  Medically stable for dc to rehab facility when arrangements finalized by case management on 6 weeks IV ABT per orders of ID  Patient was declined by [facility] by his insurance company.  Contacted them to do a peer to peer review awaiting return call  Osteomyelitis of right foot (HCC)  Noted on MRI imaging  Patient follows with podiatry outpatient for chronic wounds, podiatry continues to follow inpatient  1 of 2 blood cultures on admission with MSSA  S/p right TMA on 6/23/25  Operative cultures with MSSA, Pseudomonas and Serratia  Appreciate ID who are following-vancomycin and Zosyn discontinued 6/26, initiated cefepime   6/26 cultures x 2 negative after 5days  See plan for MSSA bacteremia  Dressings per podiatry  Allergy to antibiotic  Appreciate ID who are following for MSSA bacteremia  Avoid ciprofloxacin and Bactrim as patient reports shortness of breath with these antibiotics    VTE Pharmacologic Prophylaxis: VTE Score: 3 Moderate Risk (Score 3-4) - Pharmacological DVT Prophylaxis Ordered: apixaban (Eliquis).    Mobility:   Basic Mobility Inpatient Raw Score: 10  -[facility] Goal: 4: Move to chair/commode  -[facility] Achieved: 4:  Move to chair/commode  -[facility] Goal achieved. Continue to encourage appropriate mobility.    Patient Centered Rounds: I performed bedside rounds with nursing staff today.   Discussions with Specialists or Other Care Team Provider:  nursing, case management    Education and Discussions with Family / Patient: Updated  (wife) at bedside.    Current Length of Stay: 15 day(s)  Current Patient Status: Inpatient   Certification Statement: The patient will continue to require additional inpatient hospital stay due to  coordination of care  Discharge Plan: Medically stable for discharge when arrangements finalized by case management-patient declined by [facility] by insurance company.  Awaiting callback for [id number] review    Code Status: Level 1 - Full Code    Subjective   Denies any dizziness, lightness, chest pain or palpitations.  Pleasant, verbalizes needs.    Objective :  Temp:  [97.7 °F (36.5 °C)-98.1 °F (36.7 °C)] 97.7 °F (36.5 °C)  HR:  [68-75] 75  BP: (107-151)/(48-71) 107/51  Resp:  [18-20] 18  SpO2:  [96 %-99 %] 98 %  O2 Device: None (Room air)    Body mass index is 18.58 kg/m².     Input and Output Summary (last 24 hours):     Intake/Output Summary (Last 24 hours) at 7/2/2025 1533  Last data filed at 7/2/2025 1137  Gross per 24 hour   Intake 440 ml   Output 1275 ml   Net -835 ml       Physical Exam  Vitals reviewed.   Constitutional:       General: He is not in acute distress.     Appearance: He is well-developed.   HENT:      Head: Normocephalic and atraumatic.     Cardiovascular:      Rate and Rhythm: Normal rate and regular rhythm.      Heart sounds: No murmur heard.  Pulmonary:      Effort: Pulmonary effort is normal. No respiratory distress.      Breath sounds: Normal breath sounds. No wheezing or rales.   Abdominal:      General: There is no distension.      Palpations: Abdomen is soft.      Tenderness: There is no abdominal tenderness. There is no guarding.     Musculoskeletal:         General:  No swelling.     Skin:     General: Skin is warm and dry.      Capillary Refill: Capillary refill takes less than 2 seconds.      Findings: Erythema present.      Comments: Sacral erythema   right foot dressing intact     Neurological:      General: No focal deficit present.      Mental Status: He is alert and oriented to person, place, and time. Mental status is at baseline.     Psychiatric:         Mood and Affect: Mood normal.         Behavior: Behavior normal.         Thought Content: Thought content normal.         Judgment: Judgment normal.           Lines/Drains:  Lines/Drains/Airways       Active Status       Name Placement date Placement time Site Days    PICC Line 06/30/25 Right Basilic 06/30/25  1541  Basilic  1                            Lab Results: I have reviewed the following results:   Results from last 7 days   Lab Units 07/02/25  0438 07/01/25  0439 06/30/25  0452   WBC Thousand/uL 6.07   < > 6.82   HEMOGLOBIN g/dL 9.4*   < > 9.8*   HEMATOCRIT % 30.0*   < > 32.1*   PLATELETS Thousands/uL 335   < > 378   SEGS PCT %  --   --  59   LYMPHO PCT %  --   --  26   MONO PCT %  --   --  10   EOS PCT %  --   --  3    < > = values in this interval not displayed.     Results from last 7 days   Lab Units 07/02/25  0438   SODIUM mmol/L 131*   POTASSIUM mmol/L 4.2   CHLORIDE mmol/L 101   CO2 mmol/L 25   BUN mg/dL 30*   CREATININE mg/dL 0.77   ANION GAP mmol/L 5   CALCIUM mg/dL 8.9   GLUCOSE RANDOM mg/dL 271*         Results from last 7 days   Lab Units 07/02/25  1059 07/02/25  0734 07/01/25  2157 07/01/25  1635 07/01/25  1149 07/01/25  0714 06/30/25  2119 06/30/25  1653 06/30/25  1116 06/30/25  0721 06/29/25  2018 06/29/25  1630   POC GLUCOSE mg/dl 274* 206* 229* 248* 194* 164* 264* 226* 203* 164* 226* 242*                 Recent Cultures (last 7 days):   Results from last 7 days   Lab Units 06/26/25  1123   BLOOD CULTURE  No Growth After 5 Days.  No Growth After 5 Days.       Imaging Results Review: I reviewed  radiology reports from this admission including: MRI right foot, right foot x-ray.  Other Study Results Review: No additional pertinent studies reviewed.    Last 24 Hours Medication List:     Current Facility-Administered Medications:     acetaminophen (TYLENOL) tablet 650 mg, Q6H PRN    albuterol (PROVENTIL HFA,VENTOLIN HFA) inhaler 2 puff, Q4H PRN    albuterol inhalation solution 2.5 mg, Q6H PRN    apixaban (ELIQUIS) tablet 5 mg, BID    atenolol (TENORMIN) tablet 25 mg, Daily    atorvastatin (LIPITOR) tablet 40 mg, QPM    Budeson-Glycopyrrol-Formoterol 160-9-4.8 MCG/ACT AERO 2 puff, Q12H SANDRA    ceFEPime (MAXIPIME) 2,000 mg in dextrose 5 % 50 mL IVPB, Q8H, Last Rate: Stopped (07/02/25 6047)    clopidogrel (PLAVIX) tablet 75 mg, Daily    diltiazem (CARDIZEM) injection 15 mg, Once    diphenhydrAMINE (BENADRYL) injection 25 mg, Q6H PRN    famotidine (PEPCID) tablet 20 mg, Daily    heparin (porcine) injection 1,800 Units, Q6H PRN    heparin (porcine) injection 3,600 Units, Q6H PRN    HYDROmorphone HCl (DILAUDID) injection 0.2 mg, Q2H PRN    insulin glargine (LANTUS) subcutaneous injection 12 Units 0.12 mL, HS    insulin lispro (HumALOG/ADMELOG) 100 units/mL subcutaneous injection 1-5 Units, TID AC **AND** Fingerstick Glucose (POCT), TID AC    insulin lispro (HumALOG/ADMELOG) 100 units/mL subcutaneous injection 1-5 Units, HS    insulin lispro (HumALOG/ADMELOG) 100 units/mL subcutaneous injection 4 Units, TID With Meals    lisinopril (ZESTRIL) tablet 5 mg, Daily    moisture barrier miconazole 2% cream (aka CALEB MOISTURE BARRIER ANTIFUNGAL CREAM), BID    montelukast (SINGULAIR) tablet 10 mg, HS    multivitamin-minerals (CENTRUM) tablet 1 tablet, Daily    naloxone (NARCAN) 0.04 mg/mL syringe 0.04 mg, Q1MIN PRN    ondansetron (ZOFRAN) injection 4 mg, Q6H PRN    oxyCODONE (ROXICODONE) split tablet 2.5 mg, Q4H PRN **OR** oxyCODONE (ROXICODONE) IR tablet 5 mg, Q4H PRN    polyethylene glycol (MIRALAX) packet 17 g, Daily PRN     predniSONE tablet 5 mg, Daily    senna-docusate sodium (SENOKOT S) 8.6-50 mg per tablet 2 tablet, HS    Administrative Statements   Today, Patient Was Seen By: BELGICA Nelson  I have spent a total time of 38 minutes in caring for this patient on the day of the visit/encounter including Patient and family education, Counseling / Coordination of care, Documenting in the medical record, Reviewing/placing orders in the medical record (including tests, medications, and/or procedures), Obtaining or reviewing history  , and Communicating with other healthcare professionals .    **Please Note: This note may have been constructed using a voice recognition system.**

## 2025-07-02 NOTE — ASSESSMENT & PLAN NOTE
Noted on MRI imaging  Patient follows with podiatry outpatient for chronic wounds, podiatry continues to follow inpatient  1 of 2 blood cultures on admission with MSSA  S/p right TMA on 6/23/25  Operative cultures with MSSA, Pseudomonas and Serratia  Appreciate ID who are following-vancomycin and Zosyn discontinued 6/26, initiated cefepime   6/26 cultures x 2 negative after 5days  See plan for MSSA bacteremia  Dressings per podiatry

## 2025-07-02 NOTE — PLAN OF CARE
Problem: Prexisting or High Potential for Compromised Skin Integrity  Goal: Skin integrity is maintained or improved  Description: INTERVENTIONS:  - Identify patients at risk for skin breakdown  - Assess and monitor skin integrity including under and around medical devices   - Assess and monitor nutrition and hydration status  - Monitor labs  - Assess for incontinence   - Turn and reposition patient  - Assist with mobility/ambulation  - Relieve pressure over joycelyn prominences   - Avoid friction and shearing  - Provide appropriate hygiene as needed including keeping skin clean and dry  - Evaluate need for skin moisturizer/barrier cream  - Collaborate with interdisciplinary team  - Patient/family teaching  - Consider wound care consult    Assess:  - Review Sae scale daily  - Clean and moisturize skin every shift  - Inspect skin when repositioning, toileting, and assisting with ADLS  - Assess under medical devices   - Assess extremities for adequate circulation and sensation     Bed Management:  - Have minimal linens on bed & keep smooth, unwrinkled  - Change linens as needed when moist or perspiring  - Avoid sitting or lying in one position for more than 2 hours while in bed?Keep HOB at 30 degrees   - Toileting:  - Offer bedside commode  - Assess for incontinence   - Use incontinent care products after each incontinent episode     Activity:  - Mobilize patient 3 times a day  - Encourage activity and walks on unit  - Encourage or provide ROM exercises   - Turn and reposition patient every 2 Hours  - Use appropriate equipment to lift or move patient in bed  - Instruct/ Assist with weight shifting every 60 when out of bed in chair  - Consider limitation of chair time 2 hour intervals    Skin Care:  - Avoid use of baby powder, tape, friction and shearing, hot water or constrictive clothing  - Relieve pressure over bony prominences using mepelex  - Do not massage red bony areas    Next Steps:  - Teach patient  strategies to minimize risks  - Consider consults to  interdisciplinary teams   Outcome: Progressing     Problem: PAIN - ADULT  Goal: Verbalizes/displays adequate comfort level or baseline comfort level  Description: Interventions:  - Encourage patient to monitor pain and request assistance  - Assess pain using appropriate pain scale  - Administer analgesics as ordered based on type and severity of pain and evaluate response  - Implement non-pharmacological measures as appropriate and evaluate response  - Consider cultural and social influences on pain and pain management  - Notify physician/advanced practitioner if interventions unsuccessful or patient reports new pain  - Educate patient/family on pain management process including their role and importance of  reporting pain   - Provide non-pharmacologic/complimentary pain relief interventions  Outcome: Progressing     Problem: INFECTION - ADULT  Goal: Absence or prevention of progression during hospitalization  Description: INTERVENTIONS:  - Assess and monitor for signs and symptoms of infection  - Monitor lab/diagnostic results  - Monitor all insertion sites, i.e. indwelling lines, tubes, and drains  - Monitor endotracheal if appropriate and nasal secretions for changes in amount and color  - [place] appropriate cooling/warming therapies per order  - Administer medications as ordered  - Instruct and encourage patient and family to use good hand hygiene technique  - Identify and instruct in appropriate isolation precautions for identified infection/condition  Outcome: Progressing  Goal: Absence of fever/infection during neutropenic period  Description: INTERVENTIONS:  - Monitor WBC  - Perform strict hand hygiene  - Limit to healthy visitors only  - No plants, dried, fresh or silk flowers with otoole in patient room  Outcome: Progressing

## 2025-07-02 NOTE — CASE MANAGEMENT
Case Management Discharge Planning Note    Patient name Gold Van  Location /-01 MRN [id number]  : 1945 Date 2025       Current Admission Date: 2025  Current Admission Diagnosis:Septic shock ([facility])   Patient Active Problem List    Diagnosis Date Noted    MSSA bacteremia 2025    Osteomyelitis of right foot ([facility]) 2025    Allergy to antibiotic 2025    Pressure injury of skin of sacral region 2025    Metabolic acidosis 2025    Ambulatory dysfunction 2025    Osteomyelitis ([facility]) 2025    Anemia 2025    Pulmonary hypertension ([facility]) 2025    Septic shock ([facility]) 2025    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 ([facility]) 2025    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 ([facility]) 2025    PAF (paroxysmal atrial fibrillation) ([facility]) 2025    Chronic heart failure with preserved ejection fraction (HFpEF) ([facility]) 2025    Ulcer of right foot with fat layer exposed secondary to osteomyelitis 2025    Severe peripheral arterial disease ([facility]) 2024    Moderate protein-calorie malnutrition ([facility]) 10/09/2024    Gastroesophageal reflux disease without esophagitis 10/04/2024    Neuropathy 10/04/2024    Foot drop, left 10/04/2024    CVA (cerebrovascular accident) ([facility]) 10/02/2024    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    History of pneumothorax 2024    Non-recurrent bilateral inguinal hernia without obstruction or gangrene 2024    Pruritic condition 2024    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 2021    Acute renal failure superimposed on stage 2 chronic kidney disease  ([facility]) 2021    Hyponatremia 2021    Lung nodule 2021    Type 2 diabetes mellitus with complication, without long-term current use of insulin ([facility]) 10/23/2017    Essential hypertension 10/23/2017    Mild intermittent asthma without complication  10/23/2017    Mixed hyperlipidemia 10/23/2017      LOS (days): 15  Geometric Mean LOS (GMLOS) (days): 4.9  Days to GMLOS:-10.4     OBJECTIVE:  Risk of Unplanned Readmission Score: 39.2         Current admission status: Inpatient   Preferred Pharmacy:   [facility] [place] - [address]  [address]  [place] [address]  Phone: [phone] Fax: [phone]    [facility] - [place] - [address]  [address]  [facility] [address]  Phone: [phone] Fax: [phone]    [facility] ([facility]) #[id number] [place] - [address] [address]  Phone: [phone] Fax: [phone]    Primary Care Provider: Shayne Diaz MD    Primary Insurance: [facility]  Secondary Insurance:     DISCHARGE DETAILS:    Discharge planning discussed with:: patient & spouse at the bedside  Freedom of Choice: Yes  Comments - Freedom of Choice: recommendation is for rehab- pt was accepted to [facility] and cm sent snf refefrals if auth is not obtained for [facility] -  auth will be needed if he needs to go to snf rehab  CM contacted family/caregiver?: Yes             Contacts  Patient Contacts: Mya Van  Relationship to Patient:: Family  Contact Method: In Person  Reason/Outcome: Discharge Planning    Requested Home Health Care         Is the patient interested in [facility] at discharge?: No    DME Referral Provided  Referral made for DME?: No    Other Referral/Resources/Interventions Provided:  Interventions: Acute Rehab, Short Term Rehab  Referral Comments: pt was denied [facility]- peer to peer was completed by the provider - [facility] declined -  family has a list of snf rehabs- they wanted  [facility] cm did reach out to them and thye do not have any open beds- family would like to do a family appeal if it is offered to them- cm did send additionl snf referrals to given the pt more choices- auth is needed    Would you like to  participate in our [facility] service program?  : No - Declined    Treatment Team Recommendation: Acute Rehab, Short Term Rehab ([facility] appeal - if no [facility] snf rehab need auth- TBD)                                      IMM Given (Date):: 07/02/25  IMM Given to:: Family (wife)  Family notified:: wife at the bedside

## 2025-07-02 NOTE — OCCUPATIONAL THERAPY NOTE
"Occupational Therapy Treatment Note      Gold T Carlota    7/2/2025    Principal Problem:    Septic shock (HCC)  Active Problems:    Type 2 diabetes mellitus with complication, without long-term current use of insulin (HCC)    Essential hypertension    Mixed hyperlipidemia    Acute renal failure superimposed on stage 2 chronic kidney disease  (HCC)    Hyponatremia    Pruritic condition    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HCC)    CVA (cerebrovascular accident) (HCC)    Gastroesophageal reflux disease without esophagitis    Moderate protein-calorie malnutrition (HCC)    Severe peripheral arterial disease (HCC)    PAF (paroxysmal atrial fibrillation) (HCC)    Anemia    Pressure injury of skin of sacral region    Ambulatory dysfunction    MSSA bacteremia    Osteomyelitis of right foot (HCC)    Allergy to antibiotic      Past Medical History[1]    Past Surgical History[2]     07/02/25 0946   OT Last Visit   OT Visit Date 07/02/25   Note Type   Note Type Treatment   Pain Assessment   Pain Assessment Tool 0-10   Pain Score No Pain   Restrictions/Precautions   Weight Bearing Precautions Per Order Yes   RLE Weight Bearing Per Order NWB   Other Precautions Chair Alarm;Bed Alarm;WBS;Fall Risk;Pain  (decreased skin integrity)   Lifestyle   Autonomy Pt resides in two level house w/ spouse; I with ADLs, mobility and IADLs at baseline; +    Reciprocal Relationships supportive family   Service to Others retired -    Intrinsic Gratification just wants to get home with my wife , \"she's been dealing with this for 2 months\" > visiting him, etc.   ADL   Where Assessed Commode   LB Dressing Assistance 3  Moderate Assistance   LB Dressing Deficit Don/doff R sock;Don/doff L sock   LB Dressing Comments mod A while seated on commode   Toileting Assistance  2  Maximal Assistance   Toileting Deficit Perineal hygiene;Increased time to complete;Supervison/safety   Toileting Comments perineal and perianal hygiene while in stance with max " A for completion; second assist for balance in stance   Functional Standing Tolerance   Time 2 mins   Activity perianal hygiene   Comments while in stance with min to mod A x2 w/ RW   Bed Mobility   Supine to Sit 5  Supervision   Additional items Assist x 1;HOB elevated;Bedrails;Increased time required;Verbal cues   Sit to Supine   (DNT; pt seated in recliner upon conclusion of session)   Additional Comments VC for bedrail utilization and proper body mechanics; denied dizziness with transitional movements   Transfers   Sit to Stand 3  Moderate assistance   Additional items Assist x 2;Increased time required;Verbal cues   Stand to Sit 3  Moderate assistance   Additional items Assist x 2;Increased time required;Verbal cues   Stand pivot 3  Moderate assistance   Additional items Assist x 2;Increased time required;Verbal cues   Toilet transfer 3  Moderate assistance   Additional items Assist x 2;Increased time required;Commode;Verbal cues   Additional Comments RW used; VC for safe hand placement, proper body mechanics and overall RW management during directional turns. Leg  utilized to lift RLE up to maintain NWB status - fair compliance   Toilet Transfers   Toilet Transfer From Bed   Toilet Transfer Type To and from   Toilet Transfer to Standard bedside commode   Toilet Transfer Technique Stand pivot   Toilet Transfers Moderate assistance   Toilet Transfers Comments mod A x2 w/ RW   Therapeutic Excerise-Strength   UE Strength Yes   Right Upper Extremity- Strength   R Shoulder Flexion;Extension  (protraction/retraction)   R Elbow Elbow flexion;Elbow extension   R Position Seated   Equipment Dumbbell   R Weight/Reps/Sets 5# - 1x15 reps   RUE Strength Comment visual and verbal cues for exercise technique and sequence   Left Upper Extremity-Strength   L Shoulder Flexion;Extension  (protraction/retraction)   L Elbow Elbow flexion;Elbow extension   L Position Seated   L Weights/Reps/Sets 5# - 1x15 reps   LUE Strength  "Comment visual and verbal cues for exercise technique and sequence   Subjective   Subjective \"It is hard to keep my leg up\"   Cognition   Overall Cognitive Status WFL   Arousal/Participation Alert;Responsive;Cooperative   Attention Within functional limits   Orientation Level Oriented X4   Memory Decreased recall of precautions   Following Commands Follows one step commands without difficulty   Comments Pt agreeable to OT treatment, pleasant   Activity Tolerance   Activity Tolerance Patient tolerated treatment well   Medical Staff Made Aware Yes, RN made aware of session outcomes   Assessment   Assessment Patient participated in Skilled OT session this date with interventions consisting of ADL re training with the use of correct body mechnaics, Energy Conservation techniques, safety awareness and fall prevention techniques, therapeutic exercise to: increase functional use of BUEs, increase BUE muscle strength ,  therapeutic activities to: increase activity tolerance, increase postural control, increase trunk control, and increase OOB/ sitting tolerance . Patient agreeable to OT treatment session, upon arrival patient was found supine in bed.  In comparison to previous session, patient with improvements in functional transfers, activity tolerance and exercise tolerance . Patient requiring verbal cues for correct technique and frequent rest periods. Patient continues to be functioning below baseline level, occupational performance remains limited secondary to factors listed above and increased risk for falls and injury.   From OT standpoint, recommendation at time of d/c would with max intensity OT resources.   Patient to benefit from continued Occupational Therapy treatment while in the hospital to address deficits as defined above and maximize level of functional independence with ADLs and functional mobility.   Plan   Treatment Interventions ADL retraining;Functional transfer training;UE strengthening/ROM;Endurance " training;Patient/family training;Energy conservation;Activityengagement   Goal Expiration Date 07/04/25   OT Treatment Day 2   OT Frequency 2-3x/wk   Discharge Recommendation   Rehab Resource Intensity Level, OT I (Maximum Resource Intensity)   Additional Comments  The patient's raw score on the AM-PAC Daily Activity inpatient short form is 18, standardized score is 38.66, less than 39.4. Patients at this level are likely to benefit from discharge with max intensity OT resources. Please refer to the recommendation of the Occupational Therapist for safe discharge planning.   Additional Comments 2 Pt seen as a co-eval with PT due to the patient's co-morbidities, clinically unstable presentation, and present impairments which are a regression from the patient's baseline.   AM-PAC Daily Activity Inpatient   Lower Body Dressing 2   Bathing 2   Toileting 3   Upper Body Dressing 3   Grooming 4   Eating 4   Daily Activity Raw Score 18   Daily Activity Standardized Score (Calc for Raw Score >=11) 38.66   AM-PAC Applied Cognition Inpatient   Following a Speech/Presentation 3   Understanding Ordinary Conversation 4   Taking Medications 4   Remembering Where Things Are Placed or Put Away 3   Remembering List of 4-5 Errands 3   Taking Care of Complicated Tasks 3   Applied Cognition Raw Score 20   Applied Cognition Standardized Score 41.76   All needs met, call bell within reach  Marcella Redd OTR/L        [1]   Past Medical History:  Diagnosis Date    Asthma     COPD (chronic obstructive pulmonary disease) ([facility])     COVID-19     CVA (cerebral vascular accident) ([facility])     Diabetes mellitus ([facility])     Environmental allergies     Hyperlipidemia     Hypertension     Macular degeneration 07/13/2020    right eye    Orthostatic hypotension     Osteopenia     Pneumonia     Pneumothorax     Sinusitis    [2]   Past Surgical History:  Procedure Laterality Date    CARPAL TUNNEL RELEASE Left 08/2024    CATARACT EXTRACTION Left 07/2024     COLONOSCOPY      KNEE CARTILAGE SURGERY Right 1964    AK AMPUTATION FOOT TRANSMETARSAL Right 6/23/2025    Procedure: AMPUTATION TRANSMETATARSAL (TMA);  Surgeon: Agustin Galeas DPM;  Location: [facility];  Service: Podiatry    AK AMPUTATION METATARSAL W/TOE SINGLE Right 05/16/2025    Procedure: RAY RESECTION FOOT - R partial 5th ray resection;  Surgeon: Reinaldo Brewer DPM;  Location: [facility];  Service: Podiatry    AK AMPUTATION METATARSAL W/TOE SINGLE Right 05/23/2025    Procedure: Right partial 4th ray amputation, wound debridement;  Surgeon: Brandon Phillips DPM;  Location: [facility];  Service: Podiatry    AK TEAEC W/WO PATCH GRAFT COMMON FEMORAL Right 05/13/2025    Procedure: Right common femoral endarterectomy with bovine pericardial patch Right lower extremity angiogram Third order cannulation of the right anterior tibial artery Balloon angioplasty of the right anterior tibial with a 3x220 Fernando balloon DCB angioplasty of the SFA with a 6x150 Underwood balloon Stenting of the right SFA with two 6x150 Grazyna stents, 6x5cm Viabahn, and a 7x5cm viabahn Post-di    VASECTOMY      WISDOM TOOTH EXTRACTION      x4

## 2025-07-02 NOTE — QUICK NOTE
Infectious disease performed chart review. This morning the patient is afebrile. WBC count remains normal. No overnight events documented. The patient continues on IV cefepime, current dose of 2g q8 remains appropriate for renal function. Patient is awaiting placement in [facility] vs SNF. Infectious disease will continue to monitor patient case while he remains inpatient.

## 2025-07-02 NOTE — PLAN OF CARE
Problem: OCCUPATIONAL THERAPY ADULT  Goal: Performs self-care activities at highest level of function for planned discharge setting.  See evaluation for individualized goals.  Description: Treatment Interventions: ADL retraining, Functional transfer training, UE strengthening/ROM, Endurance training, Patient/family training, Compensatory technique education, Energy conservation, Activityengagement      See flowsheet documentation for full assessment, interventions and recommendations.   Note: Limitation: Decreased ADL status, Decreased UE strength, Decreased Safe judgement during ADL, Decreased endurance, Decreased self-care trans, Decreased high-level ADLs  Prognosis: Good  Assessment: Patient participated in Skilled OT session this date with interventions consisting of ADL re training with the use of correct body mechnaics, Energy Conservation techniques, safety awareness and fall prevention techniques, therapeutic exercise to: increase functional use of BUEs, increase BUE muscle strength ,  therapeutic activities to: increase activity tolerance, increase postural control, increase trunk control, and increase OOB/ sitting tolerance . Patient agreeable to OT treatment session, upon arrival patient was found supine in bed.  In comparison to previous session, patient with improvements in functional transfers, activity tolerance and exercise tolerance . Patient requiring verbal cues for correct technique and frequent rest periods. Patient continues to be functioning below baseline level, occupational performance remains limited secondary to factors listed above and increased risk for falls and injury.   From OT standpoint, recommendation at time of d/c would with max intensity OT resources.   Patient to benefit from continued Occupational Therapy treatment while in the hospital to address deficits as defined above and maximize level of functional independence with ADLs and functional mobility.     Rehab Resource  Intensity Level, OT: I (Maximum Resource Intensity)     Marcella Redd OTR/L

## 2025-07-02 NOTE — ASSESSMENT & PLAN NOTE
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 No definite MRI evidence of osteomyelitis. The ulcer is also in close approximation to the distal third metatarsal, and early osteomyelitis in that location cannot be excluded. No evidence of an abscess  S/p R TMA 6/23/2025  Wound culture with MSSA, Pseudomonas and Serratia  ID following, discontinued vancomycin and Zosyn 6/26 and started cefepime.    TTE without obvious signs of vegetation  6/26 follow-up blood cultures x 2 results negative after 5 days.    PICC line placement 6/30 by ID.    Plan is for 6 weeks of IV antibiotics on discharge per ID recs  Medically stable for discharge when arrangements finalized by case management-patient was declined by [facility].  I have placed a call to his insurance company to do a peer to peer review awaiting return call

## 2025-07-02 NOTE — ASSESSMENT & PLAN NOTE
Appreciate ID who are following  1/2 blood culture on admission with MSSA bacteremia  Patient was noted to have MSSA and soft tissue culture with MSSA, Pseudomonas and Serratia  TTE without signs of obvious vegetation  Appreciate ID who are following   6/26 IV Zosyn and vancomycin were discontinued by ID.  Patient was initiated on cefepime 2 g IV every 12 hours dosed for renal function-will continue  6/26 blood cultures x 2 negative after 4 days  6/30 PICC placed BY IR  Medically stable for dc to rehab facility when arrangements finalized by case management on 6 weeks IV ABT per orders of ID  Patient was declined by [facility] by his insurance company.  Contacted them to do a peer to peer review awaiting return call

## 2025-07-02 NOTE — PHYSICAL THERAPY NOTE
"   PHYSICAL THERAPY TREATMENT NOTE  NAME:  Gold Van  DATE: 07/02/25    Length Of Stay: 15  Performed at least 2 patient identifiers during session: Name and ID bracelet    TREATMENT:      07/02/25 1024   PT Last Visit   PT Visit Date 07/02/25   Note Type   Note Type Treatment   Pain Assessment   Pain Assessment Tool 0-10   Pain Score No Pain   Restrictions/Precautions   Weight Bearing Precautions Per Order Yes   RLE Weight Bearing Per Order NWB   Other Precautions Chair Alarm;Bed Alarm;WBS;Fall Risk;Pain  (decreased skin integrity)   General   Family/Caregiver Present Yes   Cognition   Overall Cognitive Status WFL   Arousal/Participation Alert;Responsive;Cooperative   Attention Within functional limits   Orientation Level Oriented X4   Memory Decreased recall of precautions   Following Commands Follows one step commands without difficulty   Comments pt agreeable to PT treatment   Bed Mobility   Supine to Sit 5  Supervision   Additional items Assist x 1;HOB elevated;Bedrails;Increased time required;Verbal cues   Additional Comments VC for bedrail utilization and proper body mechanics; denied dizziness with transitional movements   Transfers   Sit to Stand 3  Moderate assistance   Additional items Assist x 2;Increased time required;Verbal cues   Stand to Sit 3  Moderate assistance   Additional items Assist x 2;Increased time required;Verbal cues   Stand pivot 3  Moderate assistance   Additional items Assist x 2;Increased time required;Verbal cues   Toilet transfer 3  Moderate assistance   Additional items Assist x 2;Increased time required;Commode;Verbal cues   Additional Comments RW used; VC for safe hand placement, proper body mechanics and overall RW management during directional turns. Leg  utilized to lift RLE up to maintain NWB status - fair compliance   Ambulation/Elevation   Gait pattern Decreased foot clearance;Short stride;Excessively slow  (\"hopping\")   Gait Assistance 3  Moderate assist "   Additional items Assist x 2;Verbal cues;Tactile cues   Assistive Device Rolling walker   Distance 2-3 hops   Ambulation/Elevation Additional Comments leglifter used to assist in NWB   Balance   Static Sitting Fair +   Dynamic Sitting Fair   Static Standing Poor +   Dynamic Standing Poor   Ambulatory Poor -   Endurance Deficit   Endurance Deficit Yes   Endurance Deficit Description pt reports fatigue with mobility   Activity Tolerance   Activity Tolerance Patient limited by fatigue;Patient limited by pain;Other (Comment)  (NWB status, decreased skin integrity)   Nurse Made Aware nursing staff made aware of outcomes   Exercises   Quad Sets Sitting;AROM;Bilateral  (30 reps)   Heelslides Sitting;AROM;Bilateral  (30 reps)   Hip Abduction Sitting;AROM;Bilateral  (30 reps)   Hip Adduction Sitting;AROM;Bilateral  (30 reps)   Knee AROM Long Arc Quad Sitting;AROM;Bilateral  (30 reps)   Ankle Pumps Sitting;AROM;Bilateral  (30 reps)   Marching Sitting;AROM;Bilateral  (30 reps)   Assessment   Prognosis Fair   Problem List Decreased strength;Decreased endurance;Impaired balance;Decreased mobility;Decreased skin integrity;Pain   Assessment Pt seen for PT treatment session this date with interventions consisting of gait training w/ emphasis on improving pt's ability to ambulate level surfaces x 2-3 hops with mod A of 2 provided by therapist with RW, Therapeutic exercise consisting of: AROM 2 sets of 15 reps B LE in sitting position, and therapeutic activity consisting of training: bed mobility, supine<>sit transfers, sit<>stand transfers, stand pivot transfers towards B direction, and commode transfer . Pt agreeable to PT treatment session upon arrival, pt found supine in bed w/ HOB elevated, in no apparent distress and responsive. In comparison to previous session, pt with no improvements as evidenced by fair compliance with NWB status . Post session: pt returned back to recliner, chair alarm engaged, all needs in reach, and RN  notified of session findings/recommendations. Continue to recommend Level I (Maximum Resource Intensity) at time of d/c in order to maximize pt's functional independence and safety w/ mobility. Pt continues to be functioning below baseline level. PT will continue to see pt during current hospitalization in order to address the deficits listed above and provide interventions consistent w/ POC in effort to achieve STGs.    Nai Lynn PTA   Barriers to Discharge Inaccessible home environment   Goals   Patient Goals to go to rehab   LTG Expiration Date 07/04/25   Plan   Treatment/Interventions Functional transfer training;LE strengthening/ROM;Therapeutic exercise;Endurance training;Bed mobility;Gait training;Spoke to nursing   PT Frequency 3-5x/wk   Discharge Recommendation   Rehab Resource Intensity Level, PT I (Maximum Resource Intensity)   AM-PAC Basic Mobility Inpatient   Turning in Flat Bed Without Bedrails 3   Lying on Back to Sitting on Edge of Flat Bed Without Bedrails 3   Moving Bed to Chair 1   Standing Up From Chair Using Arms 1   Walk in Room 1   Climb 3-5 Stairs With Railing 1   Basic Mobility Inpatient Raw Score 10   Turning Head Towards Sound 4   Follow Simple Instructions 4   Low Function Basic Mobility Raw Score  18   Low Function Basic Mobility Standardized Score  29.25   [facility] Highest Level Of Mobility   -[facility] Goal 4: Move to chair/commode   -[facility] Achieved 4: Move to chair/commode   End of Consult   Patient Position at End of Consult Bedside chair;Bed/Chair alarm activated;All needs within reach       The patient's AM-PAC Basic Mobility Inpatient Short Form Raw Score is 10. A Raw score of less than or equal to 16 suggests the patient may benefit from discharge to post-acute rehabilitation services. Please also refer to the recommendation of the Physical Therapist for safe discharge planning.      Nai Lynn PTA,PTA

## 2025-07-03 ENCOUNTER — TELEPHONE (OUTPATIENT)
Age: 80
End: 2025-07-03

## 2025-07-03 LAB
GLUCOSE SERPL-MCNC: 131 MG/DL (ref 65–140)
GLUCOSE SERPL-MCNC: 141 MG/DL (ref 65–140)
GLUCOSE SERPL-MCNC: 201 MG/DL (ref 65–140)
GLUCOSE SERPL-MCNC: 252 MG/DL (ref 65–140)

## 2025-07-03 PROCEDURE — 82948 REAGENT STRIP/BLOOD GLUCOSE: CPT

## 2025-07-03 PROCEDURE — 99231 SBSQ HOSP IP/OBS SF/LOW 25: CPT

## 2025-07-03 PROCEDURE — 94664 DEMO&/EVAL PT USE INHALER: CPT

## 2025-07-03 RX ADMIN — FAMOTIDINE 20 MG: 20 TABLET, FILM COATED ORAL at 08:10

## 2025-07-03 RX ADMIN — ATENOLOL 25 MG: 25 TABLET ORAL at 08:10

## 2025-07-03 RX ADMIN — LISINOPRIL 5 MG: 5 TABLET ORAL at 08:10

## 2025-07-03 RX ADMIN — INSULIN LISPRO 4 UNITS: 100 INJECTION, SOLUTION INTRAVENOUS; SUBCUTANEOUS at 17:33

## 2025-07-03 RX ADMIN — CEFEPIME 2000 MG: 2 INJECTION, POWDER, FOR SOLUTION INTRAVENOUS at 10:52

## 2025-07-03 RX ADMIN — MONTELUKAST 10 MG: 10 TABLET, FILM COATED ORAL at 21:37

## 2025-07-03 RX ADMIN — INSULIN GLARGINE 12 UNITS: 100 INJECTION, SOLUTION SUBCUTANEOUS at 21:37

## 2025-07-03 RX ADMIN — CEFEPIME 2000 MG: 2 INJECTION, POWDER, FOR SOLUTION INTRAVENOUS at 17:33

## 2025-07-03 RX ADMIN — BUDESONIDE, GLYCOPYRROLATE, AND FORMOTEROL FUMARATE 2 PUFF: 160; 9; 4.8 AEROSOL, METERED RESPIRATORY (INHALATION) at 08:10

## 2025-07-03 RX ADMIN — ATORVASTATIN CALCIUM 40 MG: 40 TABLET, FILM COATED ORAL at 17:33

## 2025-07-03 RX ADMIN — BUDESONIDE, GLYCOPYRROLATE, AND FORMOTEROL FUMARATE 2 PUFF: 160; 9; 4.8 AEROSOL, METERED RESPIRATORY (INHALATION) at 20:31

## 2025-07-03 RX ADMIN — CLOPIDOGREL 75 MG: 75 TABLET ORAL at 08:10

## 2025-07-03 RX ADMIN — Medication 1 TABLET: at 08:10

## 2025-07-03 RX ADMIN — INSULIN LISPRO 2 UNITS: 100 INJECTION, SOLUTION INTRAVENOUS; SUBCUTANEOUS at 21:37

## 2025-07-03 RX ADMIN — PREDNISONE 5 MG: 5 TABLET ORAL at 08:10

## 2025-07-03 RX ADMIN — APIXABAN 5 MG: 5 TABLET, FILM COATED ORAL at 08:10

## 2025-07-03 RX ADMIN — APIXABAN 5 MG: 5 TABLET, FILM COATED ORAL at 17:33

## 2025-07-03 RX ADMIN — INSULIN LISPRO 1 UNITS: 100 INJECTION, SOLUTION INTRAVENOUS; SUBCUTANEOUS at 12:35

## 2025-07-03 RX ADMIN — INSULIN LISPRO 4 UNITS: 100 INJECTION, SOLUTION INTRAVENOUS; SUBCUTANEOUS at 12:36

## 2025-07-03 RX ADMIN — CEFEPIME 2000 MG: 2 INJECTION, POWDER, FOR SOLUTION INTRAVENOUS at 02:26

## 2025-07-03 RX ADMIN — INSULIN LISPRO 4 UNITS: 100 INJECTION, SOLUTION INTRAVENOUS; SUBCUTANEOUS at 08:11

## 2025-07-03 RX ADMIN — MICONAZOLE NITRATE: 20 CREAM TOPICAL at 08:10

## 2025-07-03 RX ADMIN — MICONAZOLE NITRATE: 20 CREAM TOPICAL at 17:33

## 2025-07-03 NOTE — WOUND OSTOMY CARE
Progress Note - Wound   Gold Van 79 y.o. male MRN: [id number]  Unit/Bed#: -01 Encounter: [id number]        Assessment:   Patient is seen for wound care follow-up. Patient is a 80 yo male that was admitted to [facility] for treatment of septic shock. Patient is an assist x 1 for turning and repositioning. Patient is continent of bowel and bladder. On assessment, patient is lying in bed on phone with his wife.     Findings:  POA evolving DTI bilateral sacro-buttocks:  Wounds are pictured and measured separately. Left buttock wound is oval in shape, pink yellow and adhered slough tissue noted, full thickness skin loss true depth not known. No drainage noted at exam. Jazmin-wound intact macerated white and pink tissue.      Right buttock wound is oval in shape,  yellow tissue noted, full thickness skin loss true depth unknown. No drainage noted at exam. Jazmin-wound dry intact pink tissue. Triad paste, 3M to macerated tissue and Foam dressing as long as patient remains continent. Patient stated he prefers having the silicone foam dressing on this wound.     Educated patient on how the wound can get too moist at times, and it's ok to change dressing to Triad 3x/day and as needed if soiled. Patient understood however still wanted the silicone foam in place.     2. Skin tears left upper arm:  Wound is irregular in shape, pink partial thickness skin loss and small serosanguinous drainage at exam. Jazmin-wound dry intact.     3. Bilateral lower extremity wounds: Wounds are pink partial thickness skin loss, scant serosanguinous drainage, jazmin-wound dry intact.     4. MASD/ITD/IAD improving rash to scrotum, bilateral groin and inner thighs,penis and perineum. Skin is intact pink hyperpigmented areas noted. Musa order in place.     5. HA Right heel pressure injury: Multiple wounds pictured and measured together, wounds are non blanchable pink intact tissue, no drainage noted. No open wounds noted.       Educated  patient on importance of frequent offloading of pressure via turning, repositioning and weight-shifting. Patient verbalized understanding of plan of care.     No induration, fluctuance, odor, warmth/temperature differences, redness, or purulence noted to the above noted wounds and skin areas assessed. New dressings applied per orders listed below. Patient tolerated well- no s/s of non-verbal pain or discomfort observed during the encounter. Bedside nurse aware of plan of care. See flow sheets for more detailed assessment findings.      Orders listed below and wound care will continue to follow, call or Secure Chat Message with questions.         Skin and Wound Care Plan:   1- Bilateral lower extremity wounds: cleanse with VASHE, apply non-adherent oil emulsion dressings on the open wound beds then top with Melgisorb, cover with silicone foam dressing or patient's preferred band aids from home. Ramone with T for treatment, date. Change every other day and as needed if soiled/displaced.   2- Apply 3M no sting barrier to macerated tissue, Triad paste to wounds to the right and left upper buttocks/sacral region, cover with Mepilex bordered foam dressing, ramone with T, date, change daily and PRN  3- Offloading green wedges  4- MASD/ITD groin, scrotum, upper thighs and perianal skin: Cleanse with soap and water, pat dry. Apply Musa cream to  2x/day and as needed if soiled.  5- Elevate heels with pillows to offload pressure  6- Ehob offloading cushion when OOB to the chair  7- Place patient on ATR turning and repositioning system.  8- Moisturize skin daily with skin nourishing cream  9- Apply silicone bordered foam dressings (Mepilex) to left Heel. Ramone with P for Prevention and change every 3 days or PRN. Peel back and inspect Q-shift.  10- Right foot managed by podiatry  11- Recommend patient follow up with out patient wound care referral for bilateral buttocks wounds. Referral already in place.   12: Right heel: Apply  Mepilex (silicone foam dressing) to heel, ramone T for treatment, date. Change every other day and as needed if soiled/displaced. Peel back dressing every shift to inspect skin integrity.   13: Specialty bed ordered        WOUNDS:  Wound 06/17/25 Pressure Injury Buttocks Mid;Right (Active)   Wound Image   07/03/25 1101   Wound Description Pink;Yellow;Slough 07/03/25 1101   Pressure Injury Stage DTPI 07/03/25 1101   Non-staged Wound Description Full thickness 07/03/25 1101   Wound Length (cm) 0.4 cm 07/03/25 1101   Wound Width (cm) 0.2 cm 07/03/25 1101   Wound Depth (cm) 0.2 cm 07/03/25 1101   Wound Surface Area (cm^2) 0.06 cm^2 07/03/25 1101   Wound Volume (cm^3) 0.008 cm^3 07/03/25 1101   Calculated Wound Volume (cm^3) 0.02 cm^3 07/03/25 1101   Change in Wound Size % 94.74 07/03/25 1101   Drainage Amount None 07/03/25 1101   Drainage Description Serosanguineous 06/30/25 1054   Miranda-wound Assessment Dry;Intact;Pink 07/03/25 1101   Treatments Cleansed;Site care 07/03/25 1101   Dressing Other (Comment);Foam, Silicon (eg. Allevyn, etc) 07/03/25 1101   Wound packed? No 07/03/25 1101   Dressing Changed Changed 07/03/25 1101   Patient Tolerance Tolerated well 07/03/25 1101   Dressing Status Clean;Dry;Intact 07/03/25 1101       Wound 05/16/25 Traumatic Skin Tear Pretibial Left (Active)   Wound Image   07/03/25 1038   Wound Description Pink 07/03/25 1038   Non-staged Wound Description Partial thickness 07/03/25 1038   Wound Length (cm) 2.3 cm 07/03/25 1038   Wound Width (cm) 1 cm 07/03/25 1038   Wound Depth (cm) 0.2 cm 07/03/25 1038   Wound Surface Area (cm^2) 1.81 cm^2 07/03/25 1038   Wound Volume (cm^3) 0.241 cm^3 07/03/25 1038   Calculated Wound Volume (cm^3) 0.46 cm^3 07/03/25 1038   Change in Wound Size % -58.62 07/03/25 1038   Drainage Amount Scant 07/03/25 1038   Drainage Description Serosanguineous 07/03/25 1038   Miranda-wound Assessment Maceration;Intact 07/03/25 1038   Treatments Cleansed;Site care 07/03/25 1038    Dressing Non adherent;Calcium Alginate;Dry dressing 07/03/25 1038   Wound packed? No 07/03/25 1038   Dressing Changed New 07/03/25 1038   Patient Tolerance Tolerated well 07/03/25 1038   Dressing Status Dry;Clean;Intact 07/03/25 1038       Wound 05/16/25 Diabetic Ulcer Foot Right;Lateral (Active)   Wound Image     06/28/25 1600   Wound Description Dry;Intact 07/03/25 1220   Drainage Amount None 07/03/25 1220   Drainage Description Serous 06/28/25 1600   Miranda-wound Assessment Intact;Dry;Clean 07/03/25 1220   Treatments Cleansed;Site care 07/03/25 1220   Wound Site Closure Sutures 07/03/25 1220   Dressing Non adherent;Gauze;ABD 07/03/25 1220   Wound packed? No 07/01/25 1500   Dressing Changed Changed 07/03/25 1220   Patient Tolerance Tolerated well 07/03/25 1220   Dressing Status Clean;Dry;Intact 07/03/25 1220       Wound 06/18/25 Groin (Active)   Wound Image   07/03/25 1051   Wound Description Dry;Intact;Pink 07/03/25 1051   Non-staged Wound Description Not applicable 07/03/25 1051   Wound Length (cm) 0 cm 07/03/25 1051   Wound Width (cm) 0 cm 07/03/25 1051   Wound Depth (cm) 0 cm 07/03/25 1051   Wound Surface Area (cm^2) 0 cm^2 07/03/25 1051   Wound Volume (cm^3) 0 cm^3 07/03/25 1051   Calculated Wound Volume (cm^3) 0 cm^3 07/03/25 1051   Change in Wound Size % 100 07/03/25 1051   Drainage Amount None 07/03/25 1051   Miranda-wound Assessment Clean;Dry;Intact 07/03/25 1051   Treatments Cleansed;Site care 07/03/25 1220   Dressing Protective barrier 07/03/25 1220   Wound packed? No 07/03/25 1051   Dressing Changed Changed 07/03/25 1220   Patient Tolerance Tolerated well 07/03/25 1051   Dressing Status Intact 07/03/25 1051       Wound 06/18/25 Pretibial Right (Active)   Wound Image   07/03/25 1043   Wound Description Pink 07/03/25 1043   Non-staged Wound Description Partial thickness 07/03/25 1043   Wound Length (cm) 0.3 cm 07/03/25 1043   Wound Width (cm) 0.2 cm 07/03/25 1043   Wound Depth (cm) 0.1 cm 07/03/25 1043    Wound Surface Area (cm^2) 0.05 cm^2 07/03/25 1043   Wound Volume (cm^3) 0.003 cm^3 07/03/25 1043   Calculated Wound Volume (cm^3) 0.01 cm^3 07/03/25 1043   Change in Wound Size % 92.31 07/03/25 1043   Drainage Amount None 07/03/25 1043   Drainage Description Serous 07/01/25 1500   Miranda-wound Assessment Dry;Intact;Clean 07/03/25 1043   Treatments Cleansed;Site care 07/03/25 1220   Dressing Non adherent;Calcium Alginate;Dry dressing 07/03/25 1043   Wound packed? No 07/03/25 1043   Dressing Changed Changed 07/03/25 1220   Patient Tolerance Tolerated well 07/03/25 1043   Dressing Status Clean;Dry;Intact 07/03/25 1043       Wound 06/23/25 Traumatic Skin Tear Knee Anterior;Right (Active)   Wound Image   07/03/25 1046   Wound Description Fragile;Beefy red 07/03/25 1220   Non-staged Wound Description Partial thickness 07/03/25 1046   Wound Length (cm) 0.6 cm 07/03/25 1046   Wound Width (cm) 0.7 cm 07/03/25 1046   Wound Depth (cm) 0.1 cm 07/03/25 1046   Wound Surface Area (cm^2) 0.33 cm^2 07/03/25 1046   Wound Volume (cm^3) 0.022 cm^3 07/03/25 1046   Calculated Wound Volume (cm^3) 0.04 cm^3 07/03/25 1046   Change in Wound Size % 73.33 07/03/25 1046   Drainage Amount None 07/03/25 1046   Drainage Description Serosanguineous 07/01/25 1500   Miranda-wound Assessment Dry;Intact;Clean 07/03/25 1046   Treatments Cleansed;Site care 07/03/25 1220   Dressing Dermagran gauze;Non adherent;Gauze 07/03/25 1220   Wound packed? No 07/03/25 1046   Dressing Changed Changed 07/03/25 1220   Patient Tolerance Tolerated well 07/03/25 1046   Dressing Status Clean;Dry;Intact 07/03/25 1046       Wound 06/17/25 Pressure Injury Buttocks Left (Active)   Wound Image   07/03/25 1102   Wound Description Pink;Yellow 07/03/25 1220   Pressure Injury Stage DTPI 07/03/25 1102   Non-staged Wound Description Full thickness 07/03/25 1220   Wound Length (cm) 2.6 cm 07/03/25 1102   Wound Width (cm) 0.7 cm 07/03/25 1102   Wound Depth (cm) 0.3 cm 07/03/25 1102    Wound Surface Area (cm^2) 1.43 cm^2 07/03/25 1102   Wound Volume (cm^3) 0.286 cm^3 07/03/25 1102   Calculated Wound Volume (cm^3) 0.55 cm^3 07/03/25 1102   Change in Wound Size % -2650 07/03/25 1102   Drainage Amount None 07/03/25 1102   Drainage Description Serosanguineous 06/30/25 1050   Miranda-wound Assessment Dry;Intact;Pink 07/03/25 1102   Treatments Cleansed;Site care 07/03/25 1220   Dressing Foam, Silicon (eg. Allevyn, etc) 07/03/25 1220   Wound packed? No 07/03/25 1102   Dressing Changed Changed 07/03/25 1220   Patient Tolerance Tolerated well 07/03/25 1102   Dressing Status Dry;Clean;Intact 07/03/25 1102       Wound 06/28/25 Arm Anterior;Left (Active)   Wound Image   07/03/25 1052   Wound Description Pink 07/03/25 1220   Non-staged Wound Description Partial thickness 07/03/25 1052   Wound Length (cm) 1.5 cm 07/03/25 1052   Wound Width (cm) 3 cm 07/03/25 1052   Wound Depth (cm) 0.2 cm 07/03/25 1052   Wound Surface Area (cm^2) 3.53 cm^2 07/03/25 1052   Wound Volume (cm^3) 0.471 cm^3 07/03/25 1052   Calculated Wound Volume (cm^3) 0.9 cm^3 07/03/25 1052   Change in Wound Size % -125 07/03/25 1052   Drainage Amount Small 07/03/25 1220   Drainage Description Serosanguineous 07/03/25 1220   Miranda-wound Assessment Pink;Bleeding 07/03/25 1220   Treatments Cleansed;Site care 07/03/25 1220   Dressing Non adherent;Calcium Alginate;Dry dressing 07/03/25 1052   Wound packed? No 07/03/25 1052   Dressing Changed Changed 07/03/25 1220   Patient Tolerance Tolerated well 07/03/25 1052   Dressing Status Clean;Dry;Intact 07/03/25 1052       Wound 07/03/25 Tibial Left;Posterior (Active)   Wound Image   07/03/25 1105   Wound Description Pink 07/03/25 1105   Non-staged Wound Description Partial thickness 07/03/25 1105   Wound Length (cm) 0.7 cm 07/03/25 1105   Wound Width (cm) 0.2 cm 07/03/25 1105   Wound Depth (cm) 0.2 cm 07/03/25 1105   Wound Surface Area (cm^2) 0.11 cm^2 07/03/25 1105   Wound Volume (cm^3) 0.015 cm^3 07/03/25  1105   Calculated Wound Volume (cm^3) 0.03 cm^3 07/03/25 1105   Drainage Amount None 07/03/25 1105   Miranda-wound Assessment Clean;Dry;Intact 07/03/25 1105   Treatments Cleansed;Site care 07/03/25 1105   Dressing Non adherent;Calcium Alginate;Dry dressing 07/03/25 1105   Wound packed? No 07/03/25 1105   Dressing Changed Changed 07/03/25 1105   Patient Tolerance Tolerated well 07/03/25 1105   Dressing Status Clean;Dry;Intact 07/03/25 1105       Wound 07/03/25 Heel Right (Active)   Wound Image   07/03/25 1108   Wound Description Non-blanchable erythema 07/03/25 1108   Non-staged Wound Description Not applicable 07/03/25 1108   Wound Length (cm) 4.5 cm 07/03/25 1108   Wound Width (cm) 3 cm 07/03/25 1108   Wound Depth (cm) 0 cm 07/03/25 1108   Wound Surface Area (cm^2) 10.6 cm^2 07/03/25 1108   Wound Volume (cm^3) 0 cm^3 07/03/25 1108   Calculated Wound Volume (cm^3) 0 cm^3 07/03/25 1108   Drainage Amount None 07/03/25 1108   Miranda-wound Assessment Clean;Dry;Intact 07/03/25 1108   Treatments Cleansed;Site care 07/03/25 1108   Dressing Foam, Silicon (eg. Allevyn, etc) 07/03/25 1108   Wound packed? No 07/03/25 1108   Dressing Changed Changed 07/03/25 1108   Patient Tolerance Tolerated well 07/03/25 1108   Dressing Status Clean;Dry;Intact 07/03/25 1108          JOSE CRUZ RodrigesN, RN

## 2025-07-03 NOTE — CASE MANAGEMENT
Case Management Discharge Planning Note    Patient name Gold Van  Location /-01 MRN [id number]  : 1945 Date 7/3/2025       Current Admission Date: 2025  Current Admission Diagnosis:Septic shock ([facility])   Patient Active Problem List    Diagnosis Date Noted    MSSA bacteremia 2025    Osteomyelitis of right foot ([facility]) 2025    Allergy to antibiotic 2025    Pressure injury of skin of sacral region 2025    Metabolic acidosis 2025    Ambulatory dysfunction 2025    Osteomyelitis ([facility]) 2025    Anemia 2025    Pulmonary hypertension ([facility]) 2025    Septic shock ([facility]) 2025    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 ([facility]) 2025    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 ([facility]) 2025    PAF (paroxysmal atrial fibrillation) ([facility]) 2025    Chronic heart failure with preserved ejection fraction (HFpEF) ([facility]) 2025    Ulcer of right foot with fat layer exposed secondary to osteomyelitis 2025    Severe peripheral arterial disease ([facility]) 2024    Moderate protein-calorie malnutrition ([facility]) 10/09/2024    Gastroesophageal reflux disease without esophagitis 10/04/2024    Neuropathy 10/04/2024    Foot drop, left 10/04/2024    CVA (cerebrovascular accident) ([facility]) 10/02/2024    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    History of pneumothorax 2024    Non-recurrent bilateral inguinal hernia without obstruction or gangrene 2024    Pruritic condition 2024    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 2021    Acute renal failure superimposed on stage 2 chronic kidney disease  ([facility]) 2021    Hyponatremia 2021    Lung nodule 2021    Type 2 diabetes mellitus with complication, without long-term current use of insulin ([facility]) 10/23/2017    Essential hypertension 10/23/2017    Mild intermittent asthma without complication  10/23/2017    Mixed hyperlipidemia 10/23/2017      LOS (days): 16  Geometric Mean LOS (GMLOS) (days): 4.9  Days to GMLOS:-11     OBJECTIVE:  Risk of Unplanned Readmission Score: 39.48         Current admission status: Inpatient   Preferred Pharmacy:   [facility] [place] - [address]  [address]  [place] [address]  Phone: [phone] Fax: [phone]    [facility] [place] - [address]  [address] [facility] [address]  Phone: [phone] Fax: [phone]    [id number] [address] [address]  Phone: [phone] Fax: [phone]    Primary Care Provider: Shayne Diaz MD    Primary Insurance: [employer] REP  Secondary Insurance:     DISCHARGE DETAILS:    Discharge planning discussed with:: pt & spouse was called at 9:06 am  Freedom of Choice: Yes  Comments - Freedom of Choice: recommendation is for rehba - [facility] accepted but the insurance denied - pt's wife want to appeal the decision -  cm has sent snf referrals if the rquest is denied again  CM contacted family/caregiver?: Yes             Contacts  Patient Contacts: Mya Van  Relationship to Patient:: Family  Contact Method: Phone  Phone Number: [phone]    Requested Home Health Care         Is the patient interested in HHC at discharge?: No    DME Referral Provided  Referral made for DME?: No    Other Referral/Resources/Interventions Provided:  Interventions: Acute Rehab, Short Term Rehab  Referral Comments: family has started the family appeal process    Would you like to participate in our [facility] service program?  : No - Declined    Treatment Team Recommendation:  ([facility] vs snf rehab need auth -TBD)

## 2025-07-03 NOTE — ASSESSMENT & PLAN NOTE
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 No definite MRI evidence of osteomyelitis. The ulcer is also in close approximation to the distal third metatarsal, and early osteomyelitis in that location cannot be excluded. No evidence of an abscess  S/p R TMA 6/23/2025  Wound culture with MSSA, Pseudomonas and Serratia  ID following, discontinued vancomycin and Zosyn 6/26 and started cefepime.    TTE without obvious signs of vegetation  6/26 follow-up blood cultures x 2 results negative after 5 days.    PICC line placement 6/30 by ID.    Plan is for 6 weeks of IV antibiotics on discharge per ID recs  Medically stable for discharge when arrangements finalized by case management-patient was declined by [facility], approved for SNF.  Case management has made referrals.  Family is now appealing the discharge

## 2025-07-03 NOTE — ASSESSMENT & PLAN NOTE
RLE NWB  PT OT-recommend rehab placement  Case management is making rehab referrals-PICC line in place   Will have 6 weeks IV ABT on discharge  Medically stable for discharge when arrangements finalized by case management  Patient was declined by [facility] by his insurance-I called to do a peer to peer review-patient was declined for [facility].  Insurance will cover SNF.  Case management following aware making referrals.  Patient's wife is pursuing the appeal process initiating today 7/3

## 2025-07-03 NOTE — PROGRESS NOTES
Progress Note - Hospitalist   Name: Gold Van 79 y.o. male I MRN: [id number]  Unit/Bed#: -01 I Date of Admission: 6/17/2025   Date of Service: 7/3/2025 I Hospital Day: 16    Assessment & Plan  Septic shock (HCC)  POA, shock criteria have since resolved  Source: RLE osteo  1 of 2 blood cultures with MSSA  Hx serratia/pseudomonas/e faecalis wound infection in past  MRI R foot-Postsurgical changes of partial fourth and fifth ray resections with a soft tissue ulcer overlying the cut surfaces of the fourth and fifth metatarsals and findings concerning for early osteomyelitis in the residual fourth metatarsal. No definite MRI evidence of osteomyelitis. The ulcer is also in close approximation to the distal third metatarsal, and early osteomyelitis in that location cannot be excluded. No evidence of an abscess  S/p R TMA 6/23/2025  Wound culture with MSSA, Pseudomonas and Serratia  ID following, discontinued vancomycin and Zosyn 6/26 and started cefepime.    TTE without obvious signs of vegetation  6/26 follow-up blood cultures x 2 results negative after 5 days.    PICC line placement 6/30 by ID.    Plan is for 6 weeks of IV antibiotics on discharge per ID recs  Medically stable for discharge when arrangements finalized by case management-patient was declined by [facility], approved for SNF.  Case management has made referrals.  Family is now appealing the discharge  Acute renal failure superimposed on stage 2 chronic kidney disease  (HCC)  Lab Results   Component Value Date    EGFR 86 07/02/2025    EGFR 86 07/01/2025    EGFR 84 06/30/2025    CREATININE 0.77 07/02/2025    CREATININE 0.77 07/01/2025    CREATININE 0.81 06/30/2025     POA with creatinine 1.49  Likely prerenal in setting of shock state which is now resolved  Creatinine currently at baseline, euvolemic on exam  Avoid nephrotoxins and hypotension  GERDA resolved  Type 2 diabetes mellitus with complication, without long-term current use of insulin ([facility])  Lab  Results   Component Value Date    HGBA1C 6.6 (H) 06/18/2025       Recent Labs     07/02/25  1608 07/02/25 2026 07/03/25  0727 07/03/25  1107   POCGLU 106 257* 141* 201*       Blood Sugar Average: Last 72 hrs:  (P) 205.5  Hold prehospital metformin and glipizide while inpatient resume on discharge  Blood sugar results reviewed  Continue sliding scale insulin coverage ACHS  Continue Lantus to 12 units at bedtime  Continue NovoLog  4 units with meals  CHO diet  Hypoglycemia protocol  Continue to monitor blood sugars and adjust regimen accordingly  BMP a.m.  Essential hypertension  Shock state resolved  Continue lisinopril, atenolol  Monitor BP per protocol  CVA (cerebrovascular accident) ([facility])  No residual deficits  Continue home statin and Plavix  PAF (paroxysmal atrial fibrillation) (Formerly [facility])  Resumed prehospital atenolol 6/25,  shock state resolved  Had episodes of atrial fibrillation with RVR 6/21 treated with 3 doses of metoprolol IV followed by Cardizem.  No further episodes  Continue prehospital Eliquis  Mixed hyperlipidemia  Continue home statin  Hyponatremia  Chronically 130-134  Creatinine at baseline  Euvolemic on exam    Pruritic condition  Continue home prednisone  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[facility])   Without exacerbation  Continue prehospital inhalers  Gastroesophageal reflux disease without esophagitis  Continue home PPI  Severe peripheral arterial disease (HCC)  Hx R SFA stent 5/13  Continue prehospital Plavix  Anemia  Baseline hemoglobin appears to be 8.5-9.5  Received 1 UPRBC 6/18 and 6/23  Hemoglobin at baseline    Pressure injury of skin of sacral region  Continue wound care per protocol  Ambulatory dysfunction  RLE NWB  PT OT-recommend rehab placement  Case management is making rehab referrals-PICC line in place   Will have 6 weeks IV ABT on discharge  Medically stable for discharge when arrangements finalized by case management  Patient was declined by [facility] by his insurance-I called to do a peer to peer  review-patient was declined for [facility].  Insurance will cover SNF.  Case management following aware making referrals.  Patient's wife is pursuing the appeal process initiating today 7/3  Moderate protein-calorie malnutrition (HCC)  Malnutrition Findings:        Nutrition following  Monitor appetites                       BMI Findings:           Body mass index is 18.58 kg/m².     MSSA bacteremia  Appreciate ID who are following  1/2 blood culture on admission with MSSA bacteremia  Patient was noted to have MSSA and soft tissue culture with MSSA, Pseudomonas and Serratia  TTE without signs of obvious vegetation  Appreciate ID who are following   6/26 IV Zosyn and vancomycin were discontinued by ID.  Patient was initiated on cefepime 2 g IV every 12 hours dosed for renal function-will continue  6/26 blood cultures x 2 negative after 4 days  6/30 PICC placed BY IR  Medically stable for dc to rehab facility when arrangements finalized by case management on 6 weeks IV ABT per orders of ID  7/2 patient was declined by [facility] by his insurance company.  Did a peer to peer review-declined for [facility], will cover SNF.  Case management aware working on discharge planning.  As of today 7/3 family is appealing the discharge  Osteomyelitis of right foot (HCC)  Noted on MRI imaging  Patient follows with podiatry outpatient for chronic wounds, podiatry continues to follow inpatient  1 of 2 blood cultures on admission with MSSA  S/p right TMA on 6/23/25  Operative cultures with MSSA, Pseudomonas and Serratia  Appreciate ID who are following-vancomycin and Zosyn discontinued 6/26, initiated cefepime   6/26 cultures x 2 negative after 5days  See plan for MSSA bacteremia  Dressings per podiatry  Allergy to antibiotic  Appreciate ID who are following for MSSA bacteremia  Avoid ciprofloxacin and Bactrim as patient reports shortness of breath with these antibiotics    VTE Pharmacologic Prophylaxis: VTE Score: 3 Moderate Risk (Score 3-4) -  Pharmacological DVT Prophylaxis Ordered: apixaban (Eliquis).    Mobility:   Basic Mobility Inpatient Raw Score: 10  JH-HLM Goal: 4: Move to chair/commode  JH-HLM Achieved: 4: Move to chair/commode  JH-HLM Goal achieved. Continue to encourage appropriate mobility.    Patient Centered Rounds: I performed bedside rounds with nursing staff today.   Discussions with Specialists or Other Care Team Provider:  nursing, case management    Education and Discussions with Family / Patient: Updated  (wife) at bedside.    Current Length of Stay: 16 day(s)  Current Patient Status: Inpatient   Certification Statement: The patient will continue to require additional inpatient hospital stay due to  coordination of care  Discharge Plan: Medically stable for discharge when arrangements finalized by case management-patient was declined by insurance company for [facility].  Family is doing the appeal process currently.  Case management continues to follow making SNF referrals as well.    Code Status: Level 1 - Full Code    Subjective   Denies any dizziness, lightness, chest pain or palpitations.  Pleasant, verbalizes needs.  A bit discussed that his insurance company declined him for [facility].    Objective :  Temp:  [97.6 °F (36.4 °C)-97.9 °F (36.6 °C)] 97.9 °F (36.6 °C)  HR:  [73-77] 73  BP: (107-151)/(51-81) 151/81  Resp:  [16-19] 19  SpO2:  [95 %-98 %] 98 %  O2 Device: None (Room air)    Body mass index is 18.58 kg/m².     Input and Output Summary (last 24 hours):     Intake/Output Summary (Last 24 hours) at 7/3/2025 1336  Last data filed at 7/3/2025 1102  Gross per 24 hour   Intake --   Output 1750 ml   Net -1750 ml       Physical Exam  Vitals reviewed.   Constitutional:       General: He is not in acute distress.     Appearance: He is well-developed.   HENT:      Head: Normocephalic and atraumatic.     Cardiovascular:      Rate and Rhythm: Normal rate and regular rhythm.      Heart sounds: No murmur heard.  Pulmonary:      Effort:  Pulmonary effort is normal. No respiratory distress.      Breath sounds: Normal breath sounds. No wheezing or rales.   Abdominal:      General: There is no distension.      Palpations: Abdomen is soft.      Tenderness: There is no abdominal tenderness. There is no guarding.     Musculoskeletal:         General: No swelling.     Skin:     General: Skin is warm and dry.      Capillary Refill: Capillary refill takes less than 2 seconds.      Findings: Erythema present.      Comments: Sacral erythema   right foot dressing intact     Neurological:      General: No focal deficit present.      Mental Status: He is alert and oriented to person, place, and time. Mental status is at baseline.     Psychiatric:         Mood and Affect: Mood normal.         Behavior: Behavior normal.         Thought Content: Thought content normal.         Judgment: Judgment normal.           Lines/Drains:  Lines/Drains/Airways       Active Status       Name Placement date Placement time Site Days    PICC Line 06/30/25 Right Basilic 06/30/25  1541  Basilic  2                            Lab Results: I have reviewed the following results:   Results from last 7 days   Lab Units 07/02/25  0438 07/01/25  0439 06/30/25  0452   WBC Thousand/uL 6.07   < > 6.82   HEMOGLOBIN g/dL 9.4*   < > 9.8*   HEMATOCRIT % 30.0*   < > 32.1*   PLATELETS Thousands/uL 335   < > 378   SEGS PCT %  --   --  59   LYMPHO PCT %  --   --  26   MONO PCT %  --   --  10   EOS PCT %  --   --  3    < > = values in this interval not displayed.     Results from last 7 days   Lab Units 07/02/25  0438   SODIUM mmol/L 131*   POTASSIUM mmol/L 4.2   CHLORIDE mmol/L 101   CO2 mmol/L 25   BUN mg/dL 30*   CREATININE mg/dL 0.77   ANION GAP mmol/L 5   CALCIUM mg/dL 8.9   GLUCOSE RANDOM mg/dL 271*         Results from last 7 days   Lab Units 07/03/25  1107 07/03/25  0727 07/02/25  2026 07/02/25  1608 07/02/25  1059 07/02/25  0734 07/01/25  2157 07/01/25  1635 07/01/25  1149 07/01/25  0714  06/30/25  2119 06/30/25  1653   POC GLUCOSE mg/dl 201* 141* 257* 106 274* 206* 229* 248* 194* 164* 264* 226*                 Recent Cultures (last 7 days):           Imaging Results Review: I reviewed radiology reports from this admission including: MRI right foot, right foot x-ray.  Other Study Results Review: No additional pertinent studies reviewed.    Last 24 Hours Medication List:     Current Facility-Administered Medications:     acetaminophen (TYLENOL) tablet 650 mg, Q6H PRN    albuterol (PROVENTIL HFA,VENTOLIN HFA) inhaler 2 puff, Q4H PRN    albuterol inhalation solution 2.5 mg, Q6H PRN    apixaban (ELIQUIS) tablet 5 mg, BID    atenolol (TENORMIN) tablet 25 mg, Daily    atorvastatin (LIPITOR) tablet 40 mg, QPM    Budeson-Glycopyrrol-Formoterol 160-9-4.8 MCG/ACT AERO 2 puff, Q12H SANDRA    ceFEPime (MAXIPIME) 2,000 mg in dextrose 5 % 50 mL IVPB, Q8H, Last Rate: 2,000 mg (07/03/25 1052)    clopidogrel (PLAVIX) tablet 75 mg, Daily    diltiazem (CARDIZEM) injection 15 mg, Once    diphenhydrAMINE (BENADRYL) injection 25 mg, Q6H PRN    famotidine (PEPCID) tablet 20 mg, Daily    heparin (porcine) injection 1,800 Units, Q6H PRN    heparin (porcine) injection 3,600 Units, Q6H PRN    HYDROmorphone HCl (DILAUDID) injection 0.2 mg, Q2H PRN    insulin glargine (LANTUS) subcutaneous injection 12 Units 0.12 mL, HS    insulin lispro (HumALOG/ADMELOG) 100 units/mL subcutaneous injection 1-5 Units, TID AC **AND** Fingerstick Glucose (POCT), TID AC    insulin lispro (HumALOG/ADMELOG) 100 units/mL subcutaneous injection 1-5 Units, HS    insulin lispro (HumALOG/ADMELOG) 100 units/mL subcutaneous injection 4 Units, TID With Meals    lisinopril (ZESTRIL) tablet 5 mg, Daily    moisture barrier miconazole 2% cream (aka CALEB MOISTURE BARRIER ANTIFUNGAL CREAM), BID    montelukast (SINGULAIR) tablet 10 mg, HS    multivitamin-minerals (CENTRUM) tablet 1 tablet, Daily    naloxone (NARCAN) 0.04 mg/mL syringe 0.04 mg, Q1MIN PRN    ondansetron  (ZOFRAN) injection 4 mg, Q6H PRN    oxyCODONE (ROXICODONE) split tablet 2.5 mg, Q4H PRN **OR** oxyCODONE (ROXICODONE) IR tablet 5 mg, Q4H PRN    polyethylene glycol (MIRALAX) packet 17 g, Daily PRN    predniSONE tablet 5 mg, Daily    senna-docusate sodium (SENOKOT S) 8.6-50 mg per tablet 2 tablet, HS    Administrative Statements   Today, Patient Was Seen By: BELGICA Nelson  I have spent a total time of 36 minutes in caring for this patient on the day of the visit/encounter including Patient and family education, Counseling / Coordination of care, Documenting in the medical record, Reviewing/placing orders in the medical record (including tests, medications, and/or procedures), Obtaining or reviewing history  , and Communicating with other healthcare professionals .    **Please Note: This note may have been constructed using a voice recognition system.**

## 2025-07-03 NOTE — TELEPHONE ENCOUNTER
Caller: Mya Van     Doctor and/or Office: Dr. Galeas/BARBARA     #: [phone]    Escalation: Care Calling on behalf of patient Scooter. They would like to speak to someone ASAP in regards to what the patients orders will be once he is discharged from the hospital which could be today or tomorrow. They have been told different things and they are not comfortable with where things currently stand. Please return call to Mya. Thank you

## 2025-07-03 NOTE — DISCHARGE SUPPORT
Case Management Assessment & Discharge Planning Note    Patient name Gold Van  Location /-01 MRN [id number]  : 1945 Date 7/3/2025       Current Admission Date: 2025  Current Admission Diagnosis:Septic shock ([facility])   Patient Active Problem List    Diagnosis Date Noted    MSSA bacteremia 2025    Osteomyelitis of right foot ([facility]) 2025    Allergy to antibiotic 2025    Pressure injury of skin of sacral region 2025    Metabolic acidosis 2025    Ambulatory dysfunction 2025    Osteomyelitis ([facility]) 2025    Anemia 2025    Pulmonary hypertension ([facility]) 2025    Septic shock ([facility]) 2025    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 ([facility]) 2025    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 ([facility]) 2025    PAF (paroxysmal atrial fibrillation) ([facility]) 2025    Chronic heart failure with preserved ejection fraction (HFpEF) ([facility]) 2025    Ulcer of right foot with fat layer exposed secondary to osteomyelitis 2025    Severe peripheral arterial disease ([facility]) 2024    Moderate protein-calorie malnutrition ([facility]) 10/09/2024    Gastroesophageal reflux disease without esophagitis 10/04/2024    Neuropathy 10/04/2024    Foot drop, left 10/04/2024    CVA (cerebrovascular accident) ([facility]) 10/02/2024    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    History of pneumothorax 2024    Non-recurrent bilateral inguinal hernia without obstruction or gangrene 2024    Pruritic condition 2024    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 2021    Acute renal failure superimposed on stage 2 chronic kidney disease  ([facility]) 2021    Hyponatremia 2021    Lung nodule 2021    Type 2 diabetes mellitus with complication, without long-term current use of insulin ([facility]) 10/23/2017    Essential hypertension 10/23/2017    Mild intermittent asthma  without complication 10/23/2017    Mixed hyperlipidemia 10/23/2017      LOS (days): 16  Geometric Mean LOS (GMLOS) (days): 4.9  Days to GMLOS:-10.9   Facility Auth Adverse Decision  DC Support Center has received: Denial  For Level of Care: Acute Rehab  Insurance: [facility]  Information obtained via : Phone  Name/Phone # of Rep who called in information: Lizzette  Reason for Adverse Decision: Does not meet criteria  Reference Number: [id number]  Facility Name: [facility]  Peer to Peer?: Completed  Appeal P#: [phone]  Appeal F#: [phone]  Other Notes: Courtesy SNF: [id number]   notified: Johanny WEEKS     Updates to authorization status will be noted in chart.    Please reach out to CM for updates on any clinical information.

## 2025-07-03 NOTE — ASSESSMENT & PLAN NOTE
Baseline hemoglobin appears to be 8.5-9.5  Received 1 UPRBC 6/18 and 6/23  Hemoglobin at baseline

## 2025-07-03 NOTE — PLAN OF CARE
Problem: Prexisting or High Potential for Compromised Skin Integrity  Goal: Skin integrity is maintained or improved  Description: INTERVENTIONS:  - Identify patients at risk for skin breakdown  - Assess and monitor skin integrity including under and around medical devices   - Assess and monitor nutrition and hydration status  - Monitor labs  - Assess for incontinence   - Turn and reposition patient  - Assist with mobility/ambulation  - Relieve pressure over joycelyn prominences   - Avoid friction and shearing  - Provide appropriate hygiene as needed including keeping skin clean and dry  - Evaluate need for skin moisturizer/barrier cream  - Collaborate with interdisciplinary team  - Patient/family teaching  - Consider wound care consult    Assess:  - Review Sae scale daily  - Clean and moisturize skin every shift  - Inspect skin when repositioning, toileting, and assisting with ADLS  - Assess under medical devices   - Assess extremities for adequate circulation and sensation     Bed Management:  - Have minimal linens on bed & keep smooth, unwrinkled  - Change linens as needed when moist or perspiring  - Avoid sitting or lying in one position for more than 2 hours while in bed?Keep HOB at 30 degrees   - Toileting:  - Offer bedside commode  - Assess for incontinence   - Use incontinent care products after each incontinent episode     Activity:  - Mobilize patient 3 times a day  - Encourage activity and walks on unit  - Encourage or provide ROM exercises   - Turn and reposition patient every 2 Hours  - Use appropriate equipment to lift or move patient in bed  - Instruct/ Assist with weight shifting every 60 when out of bed in chair  - Consider limitation of chair time 2 hour intervals    Skin Care:  - Avoid use of baby powder, tape, friction and shearing, hot water or constrictive clothing  - Relieve pressure over bony prominences using mepelex  - Do not massage red bony areas    Next Steps:  - Teach patient  strategies to minimize risks  - Consider consults to  interdisciplinary teams   Outcome: Progressing     Problem: PAIN - ADULT  Goal: Verbalizes/displays adequate comfort level or baseline comfort level  Description: Interventions:  - Encourage patient to monitor pain and request assistance  - Assess pain using appropriate pain scale  - Administer analgesics as ordered based on type and severity of pain and evaluate response  - Implement non-pharmacological measures as appropriate and evaluate response  - Consider cultural and social influences on pain and pain management  - Notify physician/advanced practitioner if interventions unsuccessful or patient reports new pain  - Educate patient/family on pain management process including their role and importance of  reporting pain   - Provide non-pharmacologic/complimentary pain relief interventions  Outcome: Progressing     Problem: INFECTION - ADULT  Goal: Absence or prevention of progression during hospitalization  Description: INTERVENTIONS:  - Assess and monitor for signs and symptoms of infection  - Monitor lab/diagnostic results  - Monitor all insertion sites, i.e. indwelling lines, tubes, and drains  - Monitor endotracheal if appropriate and nasal secretions for changes in amount and color  - [place] appropriate cooling/warming therapies per order  - Administer medications as ordered  - Instruct and encourage patient and family to use good hand hygiene technique  - Identify and instruct in appropriate isolation precautions for identified infection/condition  Outcome: Progressing  Goal: Absence of fever/infection during neutropenic period  Description: INTERVENTIONS:  - Monitor WBC  - Perform strict hand hygiene  - Limit to healthy visitors only  - No plants, dried, fresh or silk flowers with otoole in patient room  Outcome: Progressing     Problem: SAFETY ADULT  Goal: Patient will remain free of falls  Description: INTERVENTIONS:  - Educate patient/family on  patient safety including physical limitations  - Instruct patient to call for assistance with activity   - Consider consulting OT/PT to assist with strengthening/mobility based on AM PAC & JH-HLM score  - Consult OT/PT to assist with strengthening/mobility   - Keep Call bell within reach  - Keep bed low and locked with side rails adjusted as appropriate  - Keep care items and personal belongings within reach  - Initiate and maintain comfort rounds  - Make Fall Risk Sign visible to staff  - Offer Toileting every 2 Hours, in advance of need  - Initiate/Maintain bed alarm  - Obtain necessary fall risk management equipment:   - Apply yellow socks and bracelet for high fall risk patients  - Consider moving patient to room near nurses station  Outcome: Progressing  Goal: Maintain or return to baseline ADL function  Description: INTERVENTIONS:  -  Assess patient's ability to carry out ADLs; assess patient's baseline for ADL function and identify physical deficits which impact ability to perform ADLs (bathing, care of mouth/teeth, toileting, grooming, dressing, etc.)  - Assess/evaluate cause of self-care deficits   - Assess range of motion  - Assess patient's mobility; develop plan if impaired  - Assess patient's need for assistive devices and provide as appropriate  - Encourage maximum independence but intervene and supervise when necessary  - Involve family in performance of ADLs  - Assess for home care needs following discharge   - Consider OT consult to assist with ADL evaluation and planning for discharge  - Provide patient education as appropriate  - Monitor functional capacity and physical performance, use of AM PAC & JH-HLM   - Monitor gait, balance and fatigue with ambulation    Outcome: Progressing  Goal: Maintains/Returns to pre admission functional level  Description: INTERVENTIONS:  - Perform AM-PAC 6 Click Basic Mobility/ Daily Activity assessment daily.  - Set and communicate daily mobility goal to care team  and patient/family/caregiver.   - Collaborate with rehabilitation services on mobility goals if consulted  - Perform Range of Motion 3 times a day.  - Reposition patient every 2 hours.  - Dangle patient 3 times a day  - Stand patient 3 times a day  - Ambulate patient 3 times a day  - Out of bed for meals   - Out of bed for toileting  - Record patient progress and toleration of activity level   Outcome: Progressing

## 2025-07-03 NOTE — ASSESSMENT & PLAN NOTE
Appreciate ID who are following  1/2 blood culture on admission with MSSA bacteremia  Patient was noted to have MSSA and soft tissue culture with MSSA, Pseudomonas and Serratia  TTE without signs of obvious vegetation  Appreciate ID who are following   6/26 IV Zosyn and vancomycin were discontinued by ID.  Patient was initiated on cefepime 2 g IV every 12 hours dosed for renal function-will continue  6/26 blood cultures x 2 negative after 4 days  6/30 PICC placed BY IR  Medically stable for dc to rehab facility when arrangements finalized by case management on 6 weeks IV ABT per orders of ID  7/2 patient was declined by [facility] by his insurance company.  Did a peer to peer review-declined for [facility], will cover SNF.  Case management aware working on discharge planning.  As of today 7/3 family is appealing the discharge

## 2025-07-03 NOTE — ASSESSMENT & PLAN NOTE
Lab Results   Component Value Date    HGBA1C 6.6 (H) 06/18/2025       Recent Labs     07/02/25  1608 07/02/25 2026 07/03/25  0727 07/03/25  1107   POCGLU 106 257* 141* 201*       Blood Sugar Average: Last 72 hrs:  (P) 205.5  Hold prehospital metformin and glipizide while inpatient resume on discharge  Blood sugar results reviewed  Continue sliding scale insulin coverage ACHS  Continue Lantus to 12 units at bedtime  Continue NovoLog  4 units with meals  CHO diet  Hypoglycemia protocol  Continue to monitor blood sugars and adjust regimen accordingly  BMP a.m.

## 2025-07-04 LAB
ANION GAP SERPL CALCULATED.3IONS-SCNC: 4 MMOL/L (ref 4–13)
BUN SERPL-MCNC: 39 MG/DL (ref 5–25)
CALCIUM SERPL-MCNC: 9.2 MG/DL (ref 8.4–10.2)
CHLORIDE SERPL-SCNC: 103 MMOL/L (ref 96–108)
CO2 SERPL-SCNC: 25 MMOL/L (ref 21–32)
CREAT SERPL-MCNC: 0.77 MG/DL (ref 0.6–1.3)
ERYTHROCYTE [DISTWIDTH] IN BLOOD BY AUTOMATED COUNT: 15.3 % (ref 11.6–15.1)
GFR SERPL CREATININE-BSD FRML MDRD: 86 ML/MIN/1.73SQ M
GLUCOSE SERPL-MCNC: 160 MG/DL (ref 65–140)
GLUCOSE SERPL-MCNC: 169 MG/DL (ref 65–140)
GLUCOSE SERPL-MCNC: 194 MG/DL (ref 65–140)
GLUCOSE SERPL-MCNC: 194 MG/DL (ref 65–140)
GLUCOSE SERPL-MCNC: 302 MG/DL (ref 65–140)
HCT VFR BLD AUTO: 29.2 % (ref 36.5–49.3)
HGB BLD-MCNC: 9.2 G/DL (ref 12–17)
MCH RBC QN AUTO: 27.3 PG (ref 26.8–34.3)
MCHC RBC AUTO-ENTMCNC: 31.5 G/DL (ref 31.4–37.4)
MCV RBC AUTO: 87 FL (ref 82–98)
PLATELET # BLD AUTO: 316 THOUSANDS/UL (ref 149–390)
PMV BLD AUTO: 9.3 FL (ref 8.9–12.7)
POTASSIUM SERPL-SCNC: 4.1 MMOL/L (ref 3.5–5.3)
RBC # BLD AUTO: 3.37 MILLION/UL (ref 3.88–5.62)
SODIUM SERPL-SCNC: 132 MMOL/L (ref 135–147)
WBC # BLD AUTO: 5.51 THOUSAND/UL (ref 4.31–10.16)

## 2025-07-04 PROCEDURE — 82948 REAGENT STRIP/BLOOD GLUCOSE: CPT

## 2025-07-04 PROCEDURE — 85027 COMPLETE CBC AUTOMATED: CPT

## 2025-07-04 PROCEDURE — 99232 SBSQ HOSP IP/OBS MODERATE 35: CPT | Performed by: PHYSICIAN ASSISTANT

## 2025-07-04 PROCEDURE — 80048 BASIC METABOLIC PNL TOTAL CA: CPT

## 2025-07-04 PROCEDURE — 94664 DEMO&/EVAL PT USE INHALER: CPT

## 2025-07-04 PROCEDURE — 94760 N-INVAS EAR/PLS OXIMETRY 1: CPT

## 2025-07-04 RX ADMIN — Medication 1 TABLET: at 08:48

## 2025-07-04 RX ADMIN — APIXABAN 5 MG: 5 TABLET, FILM COATED ORAL at 17:29

## 2025-07-04 RX ADMIN — BUDESONIDE, GLYCOPYRROLATE, AND FORMOTEROL FUMARATE 2 PUFF: 160; 9; 4.8 AEROSOL, METERED RESPIRATORY (INHALATION) at 21:28

## 2025-07-04 RX ADMIN — CLOPIDOGREL 75 MG: 75 TABLET ORAL at 08:47

## 2025-07-04 RX ADMIN — BUDESONIDE, GLYCOPYRROLATE, AND FORMOTEROL FUMARATE 2 PUFF: 160; 9; 4.8 AEROSOL, METERED RESPIRATORY (INHALATION) at 10:00

## 2025-07-04 RX ADMIN — PREDNISONE 5 MG: 5 TABLET ORAL at 08:47

## 2025-07-04 RX ADMIN — INSULIN LISPRO 3 UNITS: 100 INJECTION, SOLUTION INTRAVENOUS; SUBCUTANEOUS at 17:31

## 2025-07-04 RX ADMIN — CEFEPIME 2000 MG: 2 INJECTION, POWDER, FOR SOLUTION INTRAVENOUS at 02:21

## 2025-07-04 RX ADMIN — INSULIN LISPRO 4 UNITS: 100 INJECTION, SOLUTION INTRAVENOUS; SUBCUTANEOUS at 08:49

## 2025-07-04 RX ADMIN — INSULIN LISPRO 1 UNITS: 100 INJECTION, SOLUTION INTRAVENOUS; SUBCUTANEOUS at 21:35

## 2025-07-04 RX ADMIN — ATORVASTATIN CALCIUM 40 MG: 40 TABLET, FILM COATED ORAL at 17:29

## 2025-07-04 RX ADMIN — INSULIN LISPRO 4 UNITS: 100 INJECTION, SOLUTION INTRAVENOUS; SUBCUTANEOUS at 17:31

## 2025-07-04 RX ADMIN — INSULIN LISPRO 4 UNITS: 100 INJECTION, SOLUTION INTRAVENOUS; SUBCUTANEOUS at 12:05

## 2025-07-04 RX ADMIN — INSULIN LISPRO 1 UNITS: 100 INJECTION, SOLUTION INTRAVENOUS; SUBCUTANEOUS at 12:05

## 2025-07-04 RX ADMIN — CEFEPIME 2000 MG: 2 INJECTION, POWDER, FOR SOLUTION INTRAVENOUS at 10:09

## 2025-07-04 RX ADMIN — FAMOTIDINE 20 MG: 20 TABLET, FILM COATED ORAL at 08:48

## 2025-07-04 RX ADMIN — CEFEPIME 2000 MG: 2 INJECTION, POWDER, FOR SOLUTION INTRAVENOUS at 17:29

## 2025-07-04 RX ADMIN — MONTELUKAST 10 MG: 10 TABLET, FILM COATED ORAL at 21:38

## 2025-07-04 RX ADMIN — INSULIN GLARGINE 12 UNITS: 100 INJECTION, SOLUTION SUBCUTANEOUS at 21:36

## 2025-07-04 RX ADMIN — MICONAZOLE NITRATE: 20 CREAM TOPICAL at 10:00

## 2025-07-04 RX ADMIN — INSULIN LISPRO 1 UNITS: 100 INJECTION, SOLUTION INTRAVENOUS; SUBCUTANEOUS at 08:48

## 2025-07-04 RX ADMIN — LISINOPRIL 5 MG: 5 TABLET ORAL at 08:47

## 2025-07-04 RX ADMIN — APIXABAN 5 MG: 5 TABLET, FILM COATED ORAL at 08:47

## 2025-07-04 RX ADMIN — MICONAZOLE NITRATE: 20 CREAM TOPICAL at 17:32

## 2025-07-04 RX ADMIN — ATENOLOL 25 MG: 25 TABLET ORAL at 08:48

## 2025-07-04 NOTE — ASSESSMENT & PLAN NOTE
Baseline hemoglobin appears to be 8.5-9.5  Received 1 UPRBC 6/18 and 6/23  Hemoglobin now stable

## 2025-07-04 NOTE — PROGRESS NOTES
Progress Note - Hospitalist   Name: Gold Van 79 y.o. male I MRN: [id number]  Unit/Bed#: -01 I Date of Admission: 6/17/2025   Date of Service: 7/4/2025 I Hospital Day: 17    Assessment & Plan  Septic shock (HCC)  POA, shock criteria have since resolved  Source: RLE osteo  1 of 2 blood cultures with MSSA  Hx serratia/pseudomonas/e faecalis wound infection in past  MRI R foot-  Postsurgical changes of partial fourth and fifth ray resections with a soft tissue ulcer overlying the cut surfaces of the fourth and fifth metatarsals and findings concerning for early osteomyelitis in the residual fourth metatarsal. No definite MRI evidence of osteomyelitis. The ulcer is also in close approximation to the distal third metatarsal, and early osteomyelitis in that location cannot be excluded. No evidence of an abscess  S/p R TMA 6/23/2025  Wound culture with MSSA, Pseudomonas and Serratia  ID consultation appreciated  Continue cefepime through to 8/6/25 to complete 6 week course  Outpatient follow-up with ID.    TTE without obvious signs of vegetation  Repeat Bld Cx (6/26) NGTD    Medically stable for discharge when arrangements finalized by case management-patient was declined by insurance for [facility], approved for SNF.  Case management has made referrals.  Family is now appealing the discharge  Acute renal failure superimposed on stage 2 chronic kidney disease  ([facility])  Lab Results   Component Value Date    EGFR 86 07/04/2025    EGFR 86 07/02/2025    EGFR 86 07/01/2025    CREATININE 0.77 07/04/2025    CREATININE 0.77 07/02/2025    CREATININE 0.77 07/01/2025     POA with creatinine 1.49  Likely prerenal in setting of shock state which is now resolved  Creatinine currently at baseline  Avoid nephrotoxins and hypotension  GERDA resolved  Type 2 diabetes mellitus with complication, without long-term current use of insulin ([facility])  Lab Results   Component Value Date    HGBA1C 6.6 (H) 06/18/2025       Recent Labs     07/03/25  5189  07/03/25  2044 07/04/25  0717 07/04/25  1110   POCGLU 131 252* 160* 169*       Blood Sugar Average: Last 72 hrs:  (P) 195.7953668741442800  Hold prehospital metformin and glipizide while inpatient resume on discharge  Continue sliding scale insulin coverage ACHS  Continue Lantus to 12 units at bedtime and NovoLog  4 units with meals  CHO diet  Hypoglycemia protocol  Continue to monitor blood sugars and adjust regimen accordingly  Essential hypertension  Continue lisinopril, atenolol  Monitor BP per protocol  CVA (cerebrovascular accident) ([facility])  No residual deficits  Continue home statin and Plavix  PAF (paroxysmal atrial fibrillation) ([facility])  Resumed prehospital atenolol 6/25,  shock state resolved  Had episodes of atrial fibrillation with RVR 6/21 treated with 3 doses of metoprolol IV followed by Cardizem.  No further episodes  Continue prehospital Eliquis  Mixed hyperlipidemia  Continue home statin  Hyponatremia  Chronically 130-134  Sodium of 132 today, continue to monitor    Pruritic condition  Continue home prednisone  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[facility])  Without exacerbation  Continue prehospital inhalers  Gastroesophageal reflux disease without esophagitis  Continue home PPI  Severe peripheral arterial disease (HCC)  Hx R SFA stent 5/13  Continue prehospital Plavix  Anemia  Baseline hemoglobin appears to be 8.5-9.5  Received 1 UPRBC 6/18 and 6/23  Hemoglobin now stable    Pressure injury of skin of sacral region  Continue wound care per protocol  Ambulatory dysfunction  RLE NWB  PT OT-recommend rehab placement  Medically stable for discharge when arrangements finalized by case management  Insurance declined ARC- peer to peer completed-patient was declined for ARC.  Insurance will cover SNF.   Wife pursing appeal process  Case management aware- making referrals.  Moderate protein-calorie malnutrition (HCC)  Malnutrition Findings:        Nutrition following  Monitor appetites                       BMI Findings:            Body mass index is 18.58 kg/m².     MSSA bacteremia  See plan for Septic Shock  Osteomyelitis of right foot (HCC)  See plan for Septic Shock  Allergy to antibiotic  Appreciate ID who are following for MSSA bacteremia  Avoid ciprofloxacin and Bactrim as patient reports shortness of breath with these antibiotics    VTE Pharmacologic Prophylaxis: VTE Score: 3 Moderate Risk (Score 3-4) - Pharmacological DVT Prophylaxis Ordered: apixaban (Eliquis).    Mobility:   Basic Mobility Inpatient Raw Score: 10  JH-HLM Goal: 4: Move to chair/commode  JH-HLM Achieved: 4: Move to chair/commode  JH-HLM Goal NOT achieved. Continue with multidisciplinary rounding and encourage appropriate mobility to improve upon JH-HLM goals.    Patient Centered Rounds: I performed bedside rounds with nursing staff today.   Discussions with Specialists or Other Care Team Provider: nursing    Education and Discussions with Family / Patient: patient updated at bedside.     Current Length of Stay: 17 day(s)  Current Patient Status: Inpatient   Certification Statement: The patient will continue to require additional inpatient hospital stay due to need for rehab placement  Discharge Plan: Anticipate discharge in 48-72 hrs to rehab facility.    Code Status: Level 1 - Full Code    Subjective   The patient was seen and examined. The patient is lying in bed in no acute distress. He denies any complaints.     Objective :  Temp:  [97.8 °F (36.6 °C)-98.2 °F (36.8 °C)] 98 °F (36.7 °C)  HR:  [71-80] 78  BP: (111-123)/(61-67) 120/67  Resp:  [17-20] 19  SpO2:  [97 %-99 %] 97 %  O2 Device: None (Room air)    Body mass index is 18.58 kg/m².     Input and Output Summary (last 24 hours):     Intake/Output Summary (Last 24 hours) at 7/4/2025 1247  Last data filed at 7/4/2025 0843  Gross per 24 hour   Intake 290 ml   Output 1200 ml   Net -910 ml       Physical Exam  Vitals and nursing note reviewed.   Constitutional:       General: He is awake.      Appearance: Normal  appearance.   HENT:      Head: Normocephalic and atraumatic.     Cardiovascular:      Rate and Rhythm: Normal rate and regular rhythm.      Heart sounds: Normal heart sounds.   Pulmonary:      Effort: Pulmonary effort is normal.      Breath sounds: Normal breath sounds.   Abdominal:      Palpations: Abdomen is soft.      Tenderness: There is no abdominal tenderness.   Feet:      Comments: Right foot with dressing in place    Skin:     General: Skin is warm and dry.     Neurological:      General: No focal deficit present.      Mental Status: He is alert and oriented to person, place, and time.     Psychiatric:         Attention and Perception: Attention normal.         Mood and Affect: Mood normal.         Speech: Speech normal.         Behavior: Behavior is cooperative.         Cognition and Memory: Cognition and memory normal.           Lines/Drains:  Lines/Drains/Airways       Active Status       Name Placement date Placement time Site Days    PICC Line 06/30/25 Right Basilic 06/30/25  1541  Basilic  3                    Central Line:  Goal for removal: N/A - Discharging with PICC for IV ABX/medications               Lab Results: I have reviewed the following results:   Results from last 7 days   Lab Units 07/04/25  0441 07/01/25  0439 06/30/25  0452   WBC Thousand/uL 5.51   < > 6.82   HEMOGLOBIN g/dL 9.2*   < > 9.8*   HEMATOCRIT % 29.2*   < > 32.1*   PLATELETS Thousands/uL 316   < > 378   SEGS PCT %  --   --  59   LYMPHO PCT %  --   --  26   MONO PCT %  --   --  10   EOS PCT %  --   --  3    < > = values in this interval not displayed.     Results from last 7 days   Lab Units 07/04/25  0441   SODIUM mmol/L 132*   POTASSIUM mmol/L 4.1   CHLORIDE mmol/L 103   CO2 mmol/L 25   BUN mg/dL 39*   CREATININE mg/dL 0.77   ANION GAP mmol/L 4   CALCIUM mg/dL 9.2   GLUCOSE RANDOM mg/dL 194*         Results from last 7 days   Lab Units 07/04/25  1110 07/04/25  0717 07/03/25  2044 07/03/25  1559 07/03/25  1107 07/03/25  0727  07/02/25  2026 07/02/25  1608 07/02/25  1059 07/02/25  0734 07/01/25  2157 07/01/25  1635   POC GLUCOSE mg/dl 169* 160* 252* 131 201* 141* 257* 106 274* 206* 229* 248*               Recent Cultures (last 7 days):         Imaging Results Review: No pertinent imaging studies reviewed.  Other Study Results Review: No additional pertinent studies reviewed.    Last 24 Hours Medication List:     Current Facility-Administered Medications:     acetaminophen (TYLENOL) tablet 650 mg, Q6H PRN    albuterol (PROVENTIL HFA,VENTOLIN HFA) inhaler 2 puff, Q4H PRN    albuterol inhalation solution 2.5 mg, Q6H PRN    apixaban (ELIQUIS) tablet 5 mg, BID    atenolol (TENORMIN) tablet 25 mg, Daily    atorvastatin (LIPITOR) tablet 40 mg, QPM    Budeson-Glycopyrrol-Formoterol 160-9-4.8 MCG/ACT AERO 2 puff, Q12H SANDRA    ceFEPime (MAXIPIME) 2,000 mg in dextrose 5 % 50 mL IVPB, Q8H, Last Rate: 2,000 mg (07/04/25 1009)    clopidogrel (PLAVIX) tablet 75 mg, Daily    diltiazem (CARDIZEM) injection 15 mg, Once    diphenhydrAMINE (BENADRYL) injection 25 mg, Q6H PRN    famotidine (PEPCID) tablet 20 mg, Daily    heparin (porcine) injection 1,800 Units, Q6H PRN    heparin (porcine) injection 3,600 Units, Q6H PRN    HYDROmorphone HCl (DILAUDID) injection 0.2 mg, Q2H PRN    insulin glargine (LANTUS) subcutaneous injection 12 Units 0.12 mL, HS    insulin lispro (HumALOG/ADMELOG) 100 units/mL subcutaneous injection 1-5 Units, TID AC **AND** Fingerstick Glucose (POCT), TID AC    insulin lispro (HumALOG/ADMELOG) 100 units/mL subcutaneous injection 1-5 Units, HS    insulin lispro (HumALOG/ADMELOG) 100 units/mL subcutaneous injection 4 Units, TID With Meals    lisinopril (ZESTRIL) tablet 5 mg, Daily    moisture barrier miconazole 2% cream (aka CALEB MOISTURE BARRIER ANTIFUNGAL CREAM), BID    montelukast (SINGULAIR) tablet 10 mg, HS    multivitamin-minerals (CENTRUM) tablet 1 tablet, Daily    naloxone (NARCAN) 0.04 mg/mL syringe 0.04 mg, Q1MIN PRN    ondansetron  (ZOFRAN) injection 4 mg, Q6H PRN    oxyCODONE (ROXICODONE) split tablet 2.5 mg, Q4H PRN **OR** oxyCODONE (ROXICODONE) IR tablet 5 mg, Q4H PRN    polyethylene glycol (MIRALAX) packet 17 g, Daily PRN    predniSONE tablet 5 mg, Daily    senna-docusate sodium (SENOKOT S) 8.6-50 mg per tablet 2 tablet, HS    Administrative Statements   Today, Patient Was Seen By: Harley Dolan PA-C  I have spent a total time of 35 minutes in caring for this patient on the day of the visit/encounter including Documenting in the medical record, Reviewing/placing orders in the medical record (including tests, medications, and/or procedures), and Obtaining or reviewing history  .    **Please Note: This note may have been constructed using a voice recognition system.**

## 2025-07-04 NOTE — ASSESSMENT & PLAN NOTE
POA, shock criteria have since resolved  Source: RLE osteo  1 of 2 blood cultures with MSSA  Hx serratia/pseudomonas/e faecalis wound infection in past  MRI R foot-  Postsurgical changes of partial fourth and fifth ray resections with a soft tissue ulcer overlying the cut surfaces of the fourth and fifth metatarsals and findings concerning for early osteomyelitis in the residual fourth metatarsal. No definite MRI evidence of osteomyelitis. The ulcer is also in close approximation to the distal third metatarsal, and early osteomyelitis in that location cannot be excluded. No evidence of an abscess  S/p R TMA 6/23/2025  Wound culture with MSSA, Pseudomonas and Serratia  ID consultation appreciated  Continue cefepime through to 8/6/25 to complete 6 week course  Outpatient follow-up with ID.    TTE without obvious signs of vegetation  Repeat Bld Cx (6/26) NGTD    Medically stable for discharge when arrangements finalized by case management-patient was declined by insurance for [facility], approved for SNF.  Case management has made referrals.  Family is now appealing the discharge

## 2025-07-04 NOTE — ASSESSMENT & PLAN NOTE
RLE NWB  PT OT-recommend rehab placement  Medically stable for discharge when arrangements finalized by case management  Insurance declined ARC- peer to peer completed-patient was declined for ARC.  Insurance will cover SNF.   Wife pursing appeal process  Case management aware- making referrals.

## 2025-07-04 NOTE — ASSESSMENT & PLAN NOTE
Lab Results   Component Value Date    EGFR 86 07/04/2025    EGFR 86 07/02/2025    EGFR 86 07/01/2025    CREATININE 0.77 07/04/2025    CREATININE 0.77 07/02/2025    CREATININE 0.77 07/01/2025     POA with creatinine 1.49  Likely prerenal in setting of shock state which is now resolved  Creatinine currently at baseline  Avoid nephrotoxins and hypotension  GERDA resolved

## 2025-07-04 NOTE — PLAN OF CARE
Problem: Prexisting or High Potential for Compromised Skin Integrity  Goal: Skin integrity is maintained or improved  Description: INTERVENTIONS:  - Identify patients at risk for skin breakdown  - Assess and monitor skin integrity including under and around medical devices   - Assess and monitor nutrition and hydration status  - Monitor labs  - Assess for incontinence   - Turn and reposition patient  - Assist with mobility/ambulation  - Relieve pressure over joycelyn prominences   - Avoid friction and shearing  - Provide appropriate hygiene as needed including keeping skin clean and dry  - Evaluate need for skin moisturizer/barrier cream  - Collaborate with interdisciplinary team  - Patient/family teaching  - Consider wound care consult    Assess:  - Review Sae scale daily  - Clean and moisturize skin every shift  - Inspect skin when repositioning, toileting, and assisting with ADLS  - Assess under medical devices   - Assess extremities for adequate circulation and sensation     Bed Management:  - Have minimal linens on bed & keep smooth, unwrinkled  - Change linens as needed when moist or perspiring  - Avoid sitting or lying in one position for more than 2 hours while in bed?Keep HOB at 30 degrees   - Toileting:  - Offer bedside commode  - Assess for incontinence   - Use incontinent care products after each incontinent episode     Activity:  - Mobilize patient 3 times a day  - Encourage activity and walks on unit  - Encourage or provide ROM exercises   - Turn and reposition patient every 2 Hours  - Use appropriate equipment to lift or move patient in bed  - Instruct/ Assist with weight shifting every 60 when out of bed in chair  - Consider limitation of chair time 2 hour intervals    Skin Care:  - Avoid use of baby powder, tape, friction and shearing, hot water or constrictive clothing  - Relieve pressure over bony prominences using mepelex  - Do not massage red bony areas    Next Steps:  - Teach patient  strategies to minimize risks  - Consider consults to  interdisciplinary teams   Outcome: Progressing     Problem: SAFETY ADULT  Goal: Patient will remain free of falls  Description: INTERVENTIONS:  - Educate patient/family on patient safety including physical limitations  - Instruct patient to call for assistance with activity   - Consider consulting OT/PT to assist with strengthening/mobility based on AM PAC & JH-HLM score  - Consult OT/PT to assist with strengthening/mobility   - Keep Call bell within reach  - Keep bed low and locked with side rails adjusted as appropriate  - Keep care items and personal belongings within reach  - Initiate and maintain comfort rounds  - Make Fall Risk Sign visible to staff  - Offer Toileting every 2 Hours, in advance of need  - Initiate/Maintain bed alarm  - Obtain necessary fall risk management equipment:   - Apply yellow socks and bracelet for high fall risk patients  - Consider moving patient to room near nurses station  Outcome: Progressing     Problem: INFECTION - ADULT  Goal: Absence or prevention of progression during hospitalization  Description: INTERVENTIONS:  - Assess and monitor for signs and symptoms of infection  - Monitor lab/diagnostic results  - Monitor all insertion sites, i.e. indwelling lines, tubes, and drains  - Monitor endotracheal if appropriate and nasal secretions for changes in amount and color  - Ranier appropriate cooling/warming therapies per order  - Administer medications as ordered  - Instruct and encourage patient and family to use good hand hygiene technique  - Identify and instruct in appropriate isolation precautions for identified infection/condition  Outcome: Progressing

## 2025-07-04 NOTE — CASE MANAGEMENT
Case Management Discharge Planning Note    Patient name Gold Van  Location /-01 MRN [id number]  : 1945 Date 2025       Current Admission Date: 2025  Current Admission Diagnosis:Septic shock ([facility])   Patient Active Problem List    Diagnosis Date Noted    MSSA bacteremia 2025    Osteomyelitis of right foot ([facility]) 2025    Allergy to antibiotic 2025    Pressure injury of skin of sacral region 2025    Metabolic acidosis 2025    Ambulatory dysfunction 2025    Osteomyelitis ([facility]) 2025    Anemia 2025    Pulmonary hypertension ([facility]) 2025    Septic shock ([facility]) 2025    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 ([facility]) 2025    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 ([facility]) 2025    PAF (paroxysmal atrial fibrillation) ([facility]) 2025    Chronic heart failure with preserved ejection fraction (HFpEF) ([facility]) 2025    Ulcer of right foot with fat layer exposed secondary to osteomyelitis 2025    Severe peripheral arterial disease ([facility]) 2024    Moderate protein-calorie malnutrition ([facility]) 10/09/2024    Gastroesophageal reflux disease without esophagitis 10/04/2024    Neuropathy 10/04/2024    Foot drop, left 10/04/2024    CVA (cerebrovascular accident) ([facility]) 10/02/2024    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[facility]) 2024    History of pneumothorax 2024    Non-recurrent bilateral inguinal hernia without obstruction or gangrene 2024    Pruritic condition 2024    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 2021    Acute renal failure superimposed on stage 2 chronic kidney disease  ([facility]) 2021    Hyponatremia 2021    Lung nodule 2021    Type 2 diabetes mellitus with complication, without long-term current use of insulin ([facility]) 10/23/2017    Essential hypertension 10/23/2017    Mild intermittent asthma without complication  10/23/2017    Mixed hyperlipidemia 10/23/2017      LOS (days): 17  Geometric Mean LOS (GMLOS) (days): 4.9  Days to GMLOS:-12.2     OBJECTIVE:  Risk of Unplanned Readmission Score: 39.96         Current admission status: Inpatient   Preferred Pharmacy:   [facility] [place] - [address]  [address] [facility] [place] [address]  Phone: [phone] Fax: [phone]    [facility] - [place], [place] - [address]  [address] [facility] [address]  Phone: [phone] Fax: [phone]    [facility] ([facility] #[id number] [place], [place] - [address] [address]  Phone: [phone] Fax: [phone]    Primary Care Provider: Shayne Diaz MD    Primary Insurance: [facility] RE[facility]  Secondary Insurance:     DISCHARGE DETAILS:    Discharge planning discussed with:: cm called Mya at 12:54 pm  Freedom of Choice: Yes  Comments - Freedom of Choice: recoomendation is for rehab - pt was approved for aru- insurance denied aru - peer to peer was completed and it was senied again - family did a peer to peer - pt's wife stated it is approved - cm has not receieved any information from the insurance cm sent a messge via2 Minutesin to [facility] - the insurance  company is  closed today- I made the spouse aware I cannot send until i get permission from [facility] that he is able to come

## 2025-07-04 NOTE — PLAN OF CARE
Problem: INFECTION - ADULT  Goal: Absence or prevention of progression during hospitalization  Description: INTERVENTIONS:  - Assess and monitor for signs and symptoms of infection  - Monitor lab/diagnostic results  - Monitor all insertion sites, i.e. indwelling lines, tubes, and drains  - Monitor endotracheal if appropriate and nasal secretions for changes in amount and color  - [place] appropriate cooling/warming therapies per order  - Administer medications as ordered  - Instruct and encourage patient and family to use good hand hygiene technique  - Identify and instruct in appropriate isolation precautions for identified infection/condition  Outcome: Progressing  Goal: Absence of fever/infection during neutropenic period  Description: INTERVENTIONS:  - Monitor WBC  - Perform strict hand hygiene  - Limit to healthy visitors only  - No plants, dried, fresh or silk flowers with otoole in patient room  Outcome: Progressing     Problem: PAIN - ADULT  Goal: Verbalizes/displays adequate comfort level or baseline comfort level  Description: Interventions:  - Encourage patient to monitor pain and request assistance  - Assess pain using appropriate pain scale  - Administer analgesics as ordered based on type and severity of pain and evaluate response  - Implement non-pharmacological measures as appropriate and evaluate response  - Consider cultural and social influences on pain and pain management  - Notify physician/advanced practitioner if interventions unsuccessful or patient reports new pain  - Educate patient/family on pain management process including their role and importance of  reporting pain   - Provide non-pharmacologic/complimentary pain relief interventions  Outcome: Progressing     Problem: SKIN/TISSUE INTEGRITY - ADULT  Goal: Skin Integrity remains intact(Skin Breakdown Prevention)  Description: Assess:  -Perform Sae assessment every shift  -Clean and moisturize skin every shift  -Inspect skin when  repositioning, toileting, and assisting with ADLS  -Assess under medical devices   -Assess extremities for adequate circulation and sensation     Bed Management:  -Have minimal linens on bed & keep smooth, unwrinkled  -Change linens as needed when moist or perspiring  -Avoid sitting or lying in one position for more than 2 hours while in bed  -Keep HOB at 30 degrees     Toileting:  -Offer bedside commode  -Assess for incontinence  -Use incontinent care products after each incontinent episode    Activity:  -Mobilize patient 3 times a day  -Encourage activity and walks on unit  -Encourage or provide ROM exercises   -Turn and reposition patient every 2 Hours  -Use appropriate equipment to lift or move patient in bed  -Instruct/ Assist with weight shifting every hour when out of bed in chair  -Consider limitation of chair time 4 hour intervals    Skin Care:  -Avoid use of baby powder, tape, friction and shearing, hot water or constrictive clothing  -Relieve pressure over bony prominences using foam dressings  -Do not massage red bony areas    Outcome: Progressing  Goal: Incision(s), wounds(s) or drain site(s) healing without S/S of infection  Description: INTERVENTIONS  - Assess and document dressing, incision, wound bed, drain sites and surrounding tissue  - Provide patient and family education  - Perform skin care/dressing changes every shift  Outcome: Progressing  Goal: Pressure injury heals and does not worsen  Description: Interventions:  - Implement low air loss mattress or specialty surface (Criteria met)  - Apply silicone foam dressing  - Instruct/assist with weight shifting every hour when in chair   - Limit chair time to 4 hour intervals  - Use special pressure reducing interventions such as waffle cushion when in chair   - Apply fecal or urinary incontinence containment device   - Perform passive or active ROM every shift  - Turn and reposition patient & offload bony prominences every 2 hours   - Utilize  friction reducing device or surface for transfers   Outcome: Progressing

## 2025-07-04 NOTE — ASSESSMENT & PLAN NOTE
Lab Results   Component Value Date    HGBA1C 6.6 (H) 06/18/2025       Recent Labs     07/03/25  1559 07/03/25  2044 07/04/25  0717 07/04/25  1110   POCGLU 131 252* 160* 169*       Blood Sugar Average: Last 72 hrs:  (P) [id number]  Hold prehospital metformin and glipizide while inpatient resume on discharge  Continue sliding scale insulin coverage ACHS  Continue Lantus to 12 units at bedtime and NovoLog  4 units with meals  CHO diet  Hypoglycemia protocol  Continue to monitor blood sugars and adjust regimen accordingly

## 2025-07-05 ENCOUNTER — HOSPITAL ENCOUNTER (INPATIENT)
Facility: HOSPITAL | Age: 80
LOS: 17 days | Discharge: HOME HEALTH | DRG: 559 | End: 2025-07-22
Attending: FAMILY MEDICINE | Admitting: FAMILY MEDICINE
Payer: COMMERCIAL

## 2025-07-05 VITALS
OXYGEN SATURATION: 97 % | BODY MASS INDEX: 18.56 KG/M2 | SYSTOLIC BLOOD PRESSURE: 133 MMHG | TEMPERATURE: 98.5 F | HEIGHT: 72 IN | WEIGHT: 137 LBS | DIASTOLIC BLOOD PRESSURE: 69 MMHG | RESPIRATION RATE: 18 BRPM | HEART RATE: 87 BPM

## 2025-07-05 PROBLEM — L89.611 PRESSURE INJURY OF RIGHT HEEL, STAGE 1: Status: ACTIVE | Noted: 2025-07-05

## 2025-07-05 PROBLEM — N17.9 ACUTE RENAL FAILURE SUPERIMPOSED ON STAGE 2 CHRONIC KIDNEY DISEASE  (HCC): Status: RESOLVED | Noted: 2021-03-24 | Resolved: 2025-07-05

## 2025-07-05 PROBLEM — L30.8 DERMATITIS ASSOCIATED WITH MOISTURE: Status: ACTIVE | Noted: 2025-07-05

## 2025-07-05 PROBLEM — S81.809A MULTIPLE OPEN WOUNDS OF LOWER LEG: Status: ACTIVE | Noted: 2025-07-05

## 2025-07-05 PROBLEM — Z89.431 S/P TRANSMETATARSAL AMPUTATION OF FOOT, RIGHT (HCC): Status: ACTIVE | Noted: 2025-07-05

## 2025-07-05 PROBLEM — N18.2 ACUTE RENAL FAILURE SUPERIMPOSED ON STAGE 2 CHRONIC KIDNEY DISEASE  (HCC): Status: RESOLVED | Noted: 2021-03-24 | Resolved: 2025-07-05

## 2025-07-05 PROBLEM — Z91.89 AT RISK FOR VENOUS THROMBOEMBOLISM (VTE): Status: ACTIVE | Noted: 2025-07-05

## 2025-07-05 PROBLEM — R52 ACUTE PAIN: Status: ACTIVE | Noted: 2025-07-05

## 2025-07-05 LAB
GLUCOSE SERPL-MCNC: 132 MG/DL (ref 65–140)
GLUCOSE SERPL-MCNC: 183 MG/DL (ref 65–140)

## 2025-07-05 PROCEDURE — 94760 N-INVAS EAR/PLS OXIMETRY 1: CPT

## 2025-07-05 PROCEDURE — 82948 REAGENT STRIP/BLOOD GLUCOSE: CPT

## 2025-07-05 PROCEDURE — 99238 HOSP IP/OBS DSCHRG MGMT 30/<: CPT | Performed by: PHYSICIAN ASSISTANT

## 2025-07-05 PROCEDURE — 94664 DEMO&/EVAL PT USE INHALER: CPT

## 2025-07-05 PROCEDURE — NC001 PR NO CHARGE: Performed by: PHYSICAL MEDICINE & REHABILITATION

## 2025-07-05 RX ORDER — OXYCODONE HYDROCHLORIDE 10 MG/1
5 TABLET ORAL EVERY 4 HOURS PRN
Qty: 10 TABLET | Refills: 0 | Status: ON HOLD | OUTPATIENT
Start: 2025-07-05 | End: 2025-07-15

## 2025-07-05 RX ADMIN — FAMOTIDINE 20 MG: 20 TABLET, FILM COATED ORAL at 08:59

## 2025-07-05 RX ADMIN — CLOPIDOGREL 75 MG: 75 TABLET ORAL at 08:59

## 2025-07-05 RX ADMIN — INSULIN LISPRO 4 UNITS: 100 INJECTION, SOLUTION INTRAVENOUS; SUBCUTANEOUS at 12:16

## 2025-07-05 RX ADMIN — BUDESONIDE, GLYCOPYRROLATE, AND FORMOTEROL FUMARATE 2 PUFF: 160; 9; 4.8 AEROSOL, METERED RESPIRATORY (INHALATION) at 09:01

## 2025-07-05 RX ADMIN — ATENOLOL 25 MG: 25 TABLET ORAL at 08:59

## 2025-07-05 RX ADMIN — MICONAZOLE NITRATE: 20 CREAM TOPICAL at 09:00

## 2025-07-05 RX ADMIN — INSULIN LISPRO 4 UNITS: 100 INJECTION, SOLUTION INTRAVENOUS; SUBCUTANEOUS at 08:58

## 2025-07-05 RX ADMIN — CEFEPIME 2000 MG: 2 INJECTION, POWDER, FOR SOLUTION INTRAVENOUS at 02:29

## 2025-07-05 RX ADMIN — LISINOPRIL 5 MG: 5 TABLET ORAL at 08:59

## 2025-07-05 RX ADMIN — Medication 1 TABLET: at 08:59

## 2025-07-05 RX ADMIN — PREDNISONE 5 MG: 5 TABLET ORAL at 08:59

## 2025-07-05 RX ADMIN — INSULIN LISPRO 1 UNITS: 100 INJECTION, SOLUTION INTRAVENOUS; SUBCUTANEOUS at 12:16

## 2025-07-05 RX ADMIN — CEFEPIME 2000 MG: 2 INJECTION, POWDER, FOR SOLUTION INTRAVENOUS at 10:37

## 2025-07-05 RX ADMIN — APIXABAN 5 MG: 5 TABLET, FILM COATED ORAL at 08:59

## 2025-07-05 NOTE — CASE MANAGEMENT
Case Management Discharge Planning Note    Patient name Gold Van  Location /-01 MRN [id number]  : 1945 Date 2025       Current Admission Date: 2025  Current Admission Diagnosis:Septic shock ([facility])   Patient Active Problem List    Diagnosis Date Noted    MSSA bacteremia 2025    Osteomyelitis of right foot ([facility]) 2025    Allergy to antibiotic 2025    Pressure injury of skin of sacral region 2025    Metabolic acidosis 2025    Ambulatory dysfunction 2025    Osteomyelitis ([facility]) 2025    Anemia 2025    Pulmonary hypertension ([facility]) 2025    Septic shock ([facility]) 2025    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 ([facility]) 2025    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 ([facility]) 2025    PAF (paroxysmal atrial fibrillation) ([facility]) 2025    Chronic heart failure with preserved ejection fraction (HFpEF) ([facility]) 2025    Ulcer of right foot with fat layer exposed secondary to osteomyelitis 2025    Severe peripheral arterial disease ([facility]) 2024    Moderate protein-calorie malnutrition ([facility]) 10/09/2024    Gastroesophageal reflux disease without esophagitis 10/04/2024    Neuropathy 10/04/2024    Foot drop, left 10/04/2024    CVA (cerebrovascular accident) ([facility]) 10/02/2024    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    History of pneumothorax 2024    Non-recurrent bilateral inguinal hernia without obstruction or gangrene 2024    Pruritic condition 2024    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 2021    Acute renal failure superimposed on stage 2 chronic kidney disease  ([facility]) 2021    Hyponatremia 2021    Lung nodule 2021    Type 2 diabetes mellitus with complication, without long-term current use of insulin ([facility]) 10/23/2017    Essential hypertension 10/23/2017    Mild intermittent asthma without complication  10/23/2017    Mixed hyperlipidemia 10/23/2017      LOS (days): 18  Geometric Mean LOS (GMLOS) (days): 4.9  Days to GMLOS:-13.1     OBJECTIVE:  Risk of Unplanned Readmission Score: 40.38         Current admission status: Inpatient   Preferred Pharmacy:   [facility] - [place], [place] - [address]  [address] [facility] [address]  Phone: [phone] Fax: [phone]    [facility] - [place], [place] - [address]  [address] [facility] [address]  Phone: [phone] Fax: [phone]    [facility] ([facility]) #[id number] [place] [id number] [address] [address]  Phone: [phone] Fax: [phone]    Primary Care Provider: Shayne Diaz MD    Primary Insurance: [email or web address] [facility]  Secondary Insurance:     DISCHARGE DETAILS:                                                                                           IMM Given (Date):: 07/05/25  IMM Given to:: Patient  IMM reviewed with patient, patient agrees with discharge determination.      Additional Comments: Auth denial overturned by family appeal.  [facility] able to accept today.  Transport arranged for 1330 via W/C van.  SLIM, RN, and patient made aware.  Report [phone].

## 2025-07-05 NOTE — ASSESSMENT & PLAN NOTE
Lab Results   Component Value Date    HGBA1C 6.6 (H) 06/18/2025       Recent Labs     07/04/25  1544 07/04/25  2042 07/05/25  0734 07/05/25  1119   POCGLU 302* 194* 132 183*       Blood Sugar Average: Last 72 hrs:  Home: Glipizide 10mg BID, Linagliptin 5mg daily (listed as not taking), Metformin 500mg at dinner   Here: Lantus 12 units QHS, Lispro 4 units TID with meals, CDI/Accuchecks.  Will need to transition to or optimize home meds, or else patient will need to diabetes kit/education.  Management as per IM.

## 2025-07-05 NOTE — ASSESSMENT & PLAN NOTE
Home: Atenolol 25mg daily, Lisinopril 5mg daily  > Here: Atenolol 25mg daily, Lisinopril 5mg daily

## 2025-07-05 NOTE — ASSESSMENT & PLAN NOTE
Home: Breztri BID, Albuterol PRN, Singulair nightly  Here: Budesonide-Glycopyrrol-Formoterol BID, Singulair QHS, Albuterol PRN   No acute exacerbation    Management as per IM  F/u [facility]

## 2025-07-05 NOTE — ASSESSMENT & PLAN NOTE
Malnutrition Findings:                                 BMI Findings:           Body mass index is 19.4 kg/m².   > Nutrition consulted

## 2025-07-05 NOTE — [facility] ADMISSION
Patient is medically cleared for discharge and is approved for admission to [facility] today.  Patient will admit to [facility] room 215 and has a 1330 transport time.  Report can be called to [phone].  CM has been updated with same in [facility].

## 2025-07-05 NOTE — ASSESSMENT & PLAN NOTE
10/2024  Presented with: loss of coordination in the setting of a COPD exacerbation. Imaging with significant and severe intracranial stenosis and atherosclerotic diseases as well as multiple foci of acute infarcts involving bilateral frontoparietal cortices.   Felt to be 2/2 hypoperfusion with question of hypercoag related to COVID>  Was on DAPT for 90 days, followed by ASA monotherapy  Also found to have Afib.   > Continue Plavix, Statin, Eliquis

## 2025-07-05 NOTE — ASSESSMENT & PLAN NOTE
Date of Consult:    9/30/2024    INDICATION(s) FOR CARDIOLOGY CONSULT:    Elevated troponin     Referring Physician:    Dr. Douglas   Cardiologist: Dr. Cadena    HPI:    70 year old female past history coronary artery disease (cath 5/9/24 ostial LAD 70% stenosis, chronic occluded D1, no intervention), prediabetes, hyperlipidemia, essential hypertension , COPD, chronic kidney disease. Presented 9/28/24 with malaise, congestion, viral symptoms. Was recently around family with similar symptoms. Admitted with COPD exacerbation, treated with solu medrol and breathing treatments with improvement in symptoms. Cardiology consulted for troponin 64 -> 71 -> 70. pBNP 244. EKG NSR.       Patient Active Problem List    Diagnosis Date Noted    COPD (chronic obstructive pulmonary disease) with acute bronchitis  (CMD) 09/29/2024     Priority: Low    Shortness of breath 09/28/2024     Priority: Low    Type 2 diabetes mellitus with hyperglycemia, without long-term current use of insulin  (CMD) 10/07/2023     Priority: Low    Paroxysmal atrial fibrillation  (CMD) 03/27/2023     Priority: Low    Hypercoagulable state due to paroxysmal atrial fibrillation  (CMD) 03/27/2023     Priority: Low    Chronic kidney disease, stage 3a  (CMD) 03/27/2023     Priority: Low    Coronary artery disease involving native coronary artery of native heart without angina pectoris 08/12/2021     Priority: Low    Prediabetes 08/12/2021     Priority: Low    Dyslipidemia, goal LDL below 70 08/12/2021     Priority: Low    Moderate persistent asthma without complication (CMD) 08/12/2021     Priority: Low    Mucopurulent chronic bronchitis  (CMD)      Priority: Low    Essential hypertension 04/19/2019     Priority: Low    Esophageal reflux 02/09/2013     Priority: Low    Osteopenia of multiple sites 07/25/2019     Repeat bone density July 2021. No bisphosphate       Past Medical History:   Diagnosis Date    Chronic kidney disease, stage 3a  (CMD) 3/27/2023     No residual deficits  Continue home statin and Plavix   Essential (primary) hypertension     Moderate persistent asthma without complication (CMD) 2021    Osteopenia of multiple sites 2019    Repeat bone density 2021. No bisphosphate    Restrictive lung disease     Type 2 diabetes mellitus with hyperglycemia, without long-term current use of insulin  (CMD) 10/7/2023     Past Surgical History:   Procedure Laterality Date    Cath place for coronary angiography w md sup - interp graft & rt heart  2021     section, low transverse      Colonoscopy      polyps noted/ type unknown    Colonoscopy      at age 50    Eye surgery  2019    cataract surgery     Fracture surgery      Shoulder surgery Left     Dislocated shoulder     Prior to Admission medications    Medication Sig Start Date End Date Taking? Authorizing Provider   predniSONE (DELTASONE) 20 MG tablet Take 2 tablets by mouth daily (with breakfast) for 5 days. Begin taking on 2024. 10/1/24 10/6/24 Yes Louis Douglas MD   cetirizine (ZyrTEC) 10 MG tablet Take 1 tablet by mouth daily as needed for Allergies.   Yes Provider, Outside   fluticasone-umeclidin-vilanterol (Trelegy Ellipta) 100-62.5-25 MCG/ACT inhaler Inhale 1 puff into the lungs daily as needed.   Yes Provider, Outside   MELATONIN PO Take 1 tablet by mouth nightly.   Yes Provider, Outside   metFORMIN (GLUCOPHAGE) 500 MG tablet Take 1 tablet by mouth daily (with breakfast).   Yes Provider, Outside   bisoprolol (ZEBETA) 5 MG tablet Take 1 tablet by mouth in the morning and 1 tablet in the evening.   Yes Provider, Outside   omeprazole (PriLOSEC) 40 MG capsule Take 1 capsule by mouth daily.   Yes Provider, Outside   Multiple Vitamins-Minerals (Multivitamin Adults) Tab Take 1 tablet by mouth daily.   Yes Provider, Outside   diclofenac (EQ Arthritis Pain) 1 % gel Apply 2 g topically nightly as needed (Pain in Knee and Back).   Yes Provider, Outside   albuterol 108 (90 Base) MCG/ACT inhaler INHALE 2 PUFFS BY MOUTH INTO THE LUNGS  EVERY 4 HOURS AS NEEDED FOR SHORTNESS OF BREATH OR WHEEZING 9/27/24  Yes Jaciel Gonzalez MD   losartan-hydrochlorothiazide (HYZAAR) 100-25 MG per tablet Take 1 tablet by mouth daily. Indications: High Blood Pressure Disorder Appointment is needed for further refills 8/27/24  Yes Sandee Garcia PA-C   rosuvastatin (CRESTOR) 40 MG tablet TAKE 1 TABLET BY MOUTH DAILY FOR HIGH AMOUNT OF FATS IN THE BLOOD 6/19/24  Yes Sandee Garcia PA-C   albuterol (VENTOLIN) (2.5 MG/3ML) 0.083% nebulizer solution USE 3 ML VIA NEBULIZER FOUR TIMES DAILY AS NEEDED FOR WHEEZING OR SHORTNESS OF BREATH 4/8/24  Yes Jaciel Gonzalez MD   fenofibrate (TRICOR) 145 MG tablet Take 1 tablet by mouth daily. Indications: High Amount of Triglycerides in the Blood 10/6/23  Yes Sandee Garcia PA-C   ipratropium (ATROVENT) 0.03 % nasal spray Spray 2 sprays in each nostril 3 times daily as needed for Rhinitis. 2/6/23  Yes Georgina Graham,    spironolactone (ALDACTONE) 25 MG tablet Take 1 tablet by mouth daily. 10/3/21  Yes Provider, Outside   warfarin (COUMADIN) 5 MG tablet Take 1/2 tablet by mouth on Sunday, Monday, Wednesday, Thursday, Friday. Take 1 tablet on Tuesday and Saturday. 10/31/21  Yes Provider, Outside   acetaminophen (TYLENOL) 325 MG tablet Take 650 mg by mouth every 4 hours as needed.   Yes Provider, Outside   zoster vaccine recomb adjuvanted (SHINGRIX) 50 MCG/0.5ML injection Inject 0.5 mLs into the muscle 1 time. Indications: Second dose to be administered 2-6 months after the initial dose. Repeat dose in 2 to 6 months (unless 1 dose already given), for a total of 2 doses. 11/10/21   Georgina Fuentes MD       ALLERGIES:   Allergen Reactions    Azithromycin ERYTHEMA     Intense facial flushing    Penicillins ANAPHYLAXIS    Sulfa Antibiotics HIVES     Social History     Tobacco Use    Smoking status: Never    Smokeless tobacco: Never   Substance Use Topics    Alcohol use: Not Currently     Comment: rarely     Family  History   Problem Relation Age of Onset    Heart disease Mother     Stroke Mother     Hypertension Mother     Myocardial Infarction Mother     Parkinsonism Father     Heart disease Father     Diabetes Sister     Kidney disease Sister     Diabetes Brother     Early death Brother         Accident    Crohn's Disease Daughter        Review of Systems  Constitutional:  Negative for fever, chills, diaphoresis, activity change, appetite change, fatigue and unexpected weight change.  HENT:  Negative for hearing loss, nosebleeds, congestion and neck pain.  Eyes:  Negative for photophobia and visual disturbance.  Respiratory:  + cough improving   Cardiovascular:  Negative for chest pain, palpitations, lightheadedness, near-syncope, syncope or leg swelling.  Gastrointestinal:  Negative for nausea, vomiting, abdominal pain, abdominal distention, diarrhea, constipation, blood in stool, throwing up blood and anal bleeding.  Genitourinary:  Negative for dysuria, frequency, hematuria and difficulty urinating.  Musculoskeletal:  Negative for myalgias, back pain, joint swelling, arthralgias and gait problem.  Skin:  Negative for color change, pallor, rash and wound.  Neurological:  Negative for dizziness, vision changes, seizures, numbness, new motor or sensory deficits or headaches.  Hematological:  Negative for adenopathy.  Does not bruise/bleed easily.  Psychiatric/Behavioral:  Negative for hallucinations, behavioral problems, confusion, sleep disturbance, dysphoric mood, decreased concentration and agitation.    Objective:    Vitals Last Value 24-Hour Range   Temperature 97.7 °F (36.5 °C) (09/30/24 0943) Temp  Min: 97.5 °F (36.4 °C)  Max: 98.4 °F (36.9 °C)   Pulse 82 (09/30/24 0943) Pulse  Min: 82  Max: 106   Respiratory 18 (09/30/24 0943) Resp  Min: 18  Max: 20   Non-Invasive  Blood Pressure 111/57 (09/30/24 0943) BP  Min: 111/57  Max: 134/57   Arterial  Blood Pressure   No data recorded   Pulse Oximetry 93 % (09/30/24 1021)  SpO2  Min: 93 %  Max: 95 %     Vitals Today Admission   Weight 60.1 kg (132 lb 9.6 oz) (09/30/24 0408) Weight: 60 kg (132 lb 4.8 oz) (09/28/24 1819)     Weight over the past 48 hours:  Patient Vitals for the past 48 hrs:   Weight   09/28/24 1819 60 kg (132 lb 4.8 oz)   09/29/24 0030 60.1 kg (132 lb 9.6 oz)   09/29/24 0500 59.6 kg (131 lb 4.8 oz)   09/30/24 0408 60.1 kg (132 lb 9.6 oz)        Intake/Output:  I/O last 3 completed shifts:  In: 860 [P.O.:860]  Out: -     Intake/Output Summary (Last 24 hours) at 9/30/2024 1141  Last data filed at 9/30/2024 0618  Gross per 24 hour   Intake 680 ml   Output --   Net 680 ml       Physical Exam  Constitutional:  Oriented to person, place, and time.  Appears well-developed and well-nourished.  No distress.  HENT:  No obvious deformity.  Head:  Normocephalic and atraumatic.  Eyes:  Conjunctivae are normal.  Pupils are equal, round.  No scleral pallor, icterus or cyanosis.  Neck:  Normal range of motion.  Neck supple.  No JVD (jugular venous distention) present.  Carotid bruit is not present.  No tracheal deviation present.  No thyromegaly present.  Cardiovascular:  Normal rate, regular rhythm and intact distal pulses.  Exam reveals no gallop and no friction rub. No murmur heard.   Pulmonary/Chest:  Effort normal and breath sounds normal.  No respiratory distress.  No wheezes.  No rales.  No tenderness.  Abdominal:  Soft.  Bowel sounds are normal.  No distention and no mass.  There is no tenderness.  There is no rebound and no guarding.  Musculoskeletal:  Normal range of motion.  No edema and no tenderness.  Lymphadenopathy:  No cervical adenopathy.  Neurological:  Alert and oriented to person, place, and time.  Skin:  Skin is warm and dry.  No rash noted.  Not diaphoretic.  No erythema.  No pallor.  Psychiatric:  Normal mood and affect.  Behavior is normal.  Judgment and thought content normal.    Laboratory Results:  Lab Results   Component Value Date    SODIUM 136 09/30/2024     POTASSIUM 4.6 09/30/2024    BUN 31 (H) 09/30/2024    CREATININE 0.74 09/30/2024    WBC 18.5 (H) 09/30/2024    HCT 31.0 (L) 09/30/2024    HGB 10.8 (L) 09/30/2024    INR 1.4 09/30/2024     (H) 11/10/2021    GLUCOSE 205 (H) 09/30/2024    TSH 0.394 09/29/2024    CHOLESTEROL 153 10/06/2023    HDL 19 (L) 10/06/2023    CALCLDL  10/06/2023      Comment:      Unable to Calculate LDL    TRIGLYCERIDE 438 (H) 10/06/2023    MG 1.8 09/29/2024       - 8/3/2021 [facility]:   HEMODYNAMICS:   Hemodynamic assessment demonstrated no systemic hypertension and mildly to   moderately elevated LVEDP.     CORONARY CIRCULATION:   The coronary circulation is right dominant.   Left main: Normal.   Ostial LAD: There was a discrete 50 % stenosis. The lesion was eccentric.   Distal LAD: The vessel was small sized. Angiography showed diffuse disease. The   distal vessel was supplied by limited collaterals from RV marginal branches.   There was a 100 % stenosis in the distal third of the vessel segment. 1st   diagonal: The distal vessel was supplied by limited collaterals from the distal   LAD. There was a 100 % stenosis at the ostium of the vessel segment.   1st obtuse marginal: The vessel was very small sized.   2nd obtuse marginal: The vessel was very small sized.   3rd obtuse marginal: The vessel was medium to large sized. Angiography showed   no evidence of disease.     CARDIAC STRUCTURES:   No LV gram was performed; however, a recent echocardiogram demonstrated an EF   of 46 %.   There was no aortic stenosis.     - 4/22/2024 TME:   RESTING ECHOCARDIOGRAM  Left ventricle is normal in size. There is normal left ventricular systolic  function. The left ventricular wall motion is normal. The quantitative LVEF  based on modified Owens's method is 74%. The left ventricular mass indexed  to body surface area and based on gender is normal.     - 4/22/2024 TME:   RESTING ECHOCARDIOGRAM  Left ventricle is normal in size. There is normal left  ventricular systolic  function. The left ventricular wall motion is normal. The quantitative LVEF  based on modified Owens's method is 74%. The left ventricular mass indexed  to body surface area and based on gender is normal.     Baseline/Rest:  At baseline, the left ventricle is normal size with normal systolic function,  and the ECG shows a non-specific intraventricular conduction delay. There are  non-specific ST-T abnormalities in the lateral leads.    Peak Exercise/Stress:  With stress, there is hypokinesis of anterolateral, anterior, and anteroseptal  walls.  The visually estimated LVEF is 50-53%.  ECG changes are consistent with ischemia and recover after 5 to 7 minutes into  recovery.  The patient reported dyspnea during exercise.    Conclusions:  1. Abnormal exercise echocardiogram stress test.  2. Exercise echocardiogram demonstrates a large region of inducible ischemia.  3. Good functional capacity for age and gender.    The results of the study were communicated with the ordering physician at 3:07  PM on 4/22/24.   Best Practice Guidelines:      AMI and/or Systolic Heart Failure  LVEF documented:  Awaiting Diagnostics  ACE/ARB ordered for LVEF<40%:  Not Indicated  Beta Blocker ordered:  Yes  Aldosterone blocking agent prescribed for acute AMI patients with LVEF<40% and history of current diabetes or CHF:  Not Indicated  AICD for EF < 35%:  Not indicated  Hydralazine and Nitrate ordered for an  patient with LVEF <40%:  Not Indicated    AMI measures  Aspirin therapy prescribed:  Yes  ASA given within 24 hours of arrival or 24 hours prior to admission:  Yes   LDL ordered during current admission:  Yes  Statin Therapy ordered:  Yes  ADP receptor inhibitor prescribed:      Atrial Fibrillation: no       Assessment:  70 year old female past history coronary artery disease (cath 5/9/24 ostial LAD 70% stenosis, chronic occluded D1, no intervention), prediabetes, hyperlipidemia, essential  hypertension , COPD, chronic kidney disease. Presented 9/28/24 with malaise, congestion, viral symptoms. Was recently around family with similar symptoms. Admitted with COPD exacerbation, treated with solu medrol and breathing treatments with improvement in symptoms. Cardiology consulted for troponin 64 -> 71 -> 70. pBNP 244. EKG NSR.     CAD / elevated troponin   Cath 5/9/24 ostial LAD 70% stenosis, chronic occluded D1, no intervention  Minimal elevated troponin 71   EKG NSR without acute ischemic changes   No chest pain   Echo pending   Continue BB, statin     HUGO Nuñez seen in conjunction with the attending cardiologist, Dr. Pitt        Addendum:  Patient seen and examined with Lawanda ARIAS. Present and past medical, surgical, social history, physical exam, review of systems, medication list, vital signs, lab results and imaging studies personally reviewed. I agree with assessment and plan which was formulated by myself.       70 year old female with a history of dyslipidemia, coronary artery disease (cath 5/9/24 ostial LAD 70% stenosis, chronic occluded D1, no interventionOst LAD to Prox LAD lesion in the ostium is 70% stenosed. Lesion length: 3 mm. The lesion is discrete. The lesion is not chronically occluded. The lesion was not previously treated. iFR was measured. iFR ratio: 0.92. Pressure wire/FFR was performed on the lesion. FFR: 0.86. The method of adenosine delivery was intracoronary bolus. First Diagonal Branch: 1st Diag lesion is 100% stenosed. The first diagonal is likely occluded with the distal aspect of the vessel filling faintly by collaterals. This is unchanged from the 2021 cath), prediabetes,  essential hypertension , COPD, chronic kidney disease. Presented 09/28/2024 with malaise, congestion, viral symptoms. Was recently around family with similar symptoms. Admitted with COPD exacerbation, treated with solu medrol and breathing treatments with improvement in symptoms.  Cardiology consulted for troponin 64 -> 71 -> 70. pBNP 244. EKG NSR.       Hypertensive urgency/ Coronary artery disease/ Elevated Troponin, demand ischemia and pBNP:  Trend Troponin until peak.  Echocardiogram pending.  Discontinue Losartan-hydrochlorothiazide (10-12.5)  Start Losartan 50 mg daily in am.  Start Imdur 30 mg daily in ma  Start Amlodipine 5 mg daily in am.  Unclear whether systolic, diastolic or a combination of both.  Check BMP and Mg levels daily.   Replete electrolytes to maintain K > 4, Mg > 2   Maintain strict I/O records.   Check daily weight.   A low sodium (below 1000 mg daily) diet recommended.  Avoid use of NSAIDs including but not limited to ibuprofen, Advil and Aleve.       Thank you for allowing me to participate in the care of your patient, please do not hesitate to call with questions.

## 2025-07-05 NOTE — ASSESSMENT & PLAN NOTE
POA to [facility]  > Continue local wound care to pretibial wounds  > Nursing to place update photos in chart  > CALEB cream for groin BID

## 2025-07-05 NOTE — DISCHARGE SUMMARY
Discharge Summary - Hospitalist   Name: Gold Van 79 y.o. male I MRN: [id number]  Unit/Bed#: -01 I Date of Admission: 6/17/2025   Date of Service: 7/5/2025 I Hospital Day: 18     Assessment & Plan  Septic shock (Formerly [facility])  POA, shock criteria have since resolved  Source: RLE osteo  1 of 2 blood cultures with MSSA  Hx serratia/pseudomonas/e faecalis wound infection in past  MRI R foot-  Postsurgical changes of partial fourth and fifth ray resections with a soft tissue ulcer overlying the cut surfaces of the fourth and fifth metatarsals and findings concerning for early osteomyelitis in the residual fourth metatarsal. No definite MRI evidence of osteomyelitis. The ulcer is also in close approximation to the distal third metatarsal, and early osteomyelitis in that location cannot be excluded. No evidence of an abscess  S/p R TMA 6/23/2025  Wound culture with MSSA, Pseudomonas and Serratia  ID consultation appreciated  Continue cefepime through to 8/6/25 to complete 6 week course  Outpatient follow-up with ID.    TTE without obvious signs of vegetation  Repeat Bld Cx (6/26) NGTD    Medically stable for discharge.   The patient was accepted at [facility]  Acute renal failure superimposed on stage 2 chronic kidney disease  (Formerly [facility])  Lab Results   Component Value Date    EGFR 86 07/04/2025    EGFR 86 07/02/2025    EGFR 86 07/01/2025    CREATININE 0.77 07/04/2025    CREATININE 0.77 07/02/2025    CREATININE 0.77 07/01/2025     POA with creatinine 1.49  Likely prerenal in setting of shock state which is now resolved  Creatinine currently at baseline  GERDA resolved  Type 2 diabetes mellitus with complication, without long-term current use of insulin (Formerly [facility])  Lab Results   Component Value Date    HGBA1C 6.6 (H) 06/18/2025       Recent Labs     07/04/25  1544 07/04/25  2042 07/05/25  0734 07/05/25  1119   POCGLU 302* 194* 132 183*       Blood Sugar Average: Last 72 hrs:  (P) [id number]  Prehospital metformin and glipizide  held on admission, resume on discharge  CHO diet  Continue to monitor blood sugars and adjust regimen accordingly  Essential hypertension  Continue lisinopril, atenolol  CVA (cerebrovascular accident) (HCC)  No residual deficits  Continue home statin and Plavix  PAF (paroxysmal atrial fibrillation) (HCC)  Resumed prehospital atenolol 6/25,  shock state resolved  Had episodes of atrial fibrillation with RVR 6/21 treated with 3 doses of metoprolol IV followed by Cardizem.  No further episodes  Continue prehospital Eliquis  Mixed hyperlipidemia  Continue home statin  Hyponatremia  Chronically 130-134  Sodium of 132 today, continue to monitor periodically     Pruritic condition  Continue home prednisone  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HCC)  Without exacerbation  Continue home inhalers  Gastroesophageal reflux disease without esophagitis  Continue home PPI  Severe peripheral arterial disease (HCC)  Hx R SFA stent 5/13  Continue prehospital Plavix  Anemia  Baseline hemoglobin appears to be 8.5-9.5  Received 1 UPRBC 6/18 and 6/23  Hemoglobin now stable at 9.2    Pressure injury of skin of sacral region  Continue wound care per protocol  Ambulatory dysfunction  RLE NWB  PT OT-recommend rehab placement  Patient accepted at [facility]  Moderate protein-calorie malnutrition (HCC)  Malnutrition Findings:        Nutrition following  Monitor appetites                       BMI Findings:           Body mass index is 18.58 kg/m².     MSSA bacteremia  See plan for Septic Shock  Osteomyelitis of right foot (HCC)  See plan for Septic Shock  Allergy to antibiotic  Appreciate ID who are following for MSSA bacteremia  Avoid ciprofloxacin and Bactrim as patient reports shortness of breath with these antibiotics     Medical Problems       Resolved Problems  Date Reviewed: 7/3/2025   None       Discharging Physician / Practitioner: Harley Dolan PA-C  PCP: Shayne Diaz MD  Admission Date:   Admission Orders (From admission, onward)       Ordered         06/17/25 1251  INPATIENT ADMISSION  Once                          Discharge Date: 07/05/25    Next Steps for Physician/AP Assuming Care:  Outpatient follow-up with ID and podiatry     Test Results Pending at Discharge (will require follow up):  none    Medication Changes for Discharge & Rationale:   N/A  See after visit summary for reconciled discharge medications provided to patient and/or family.     Consultations During Hospital Stay:  Podiatry  Infectious disease    Procedures Performed:   Right TMA on 6/23/2025    Significant Findings / Test Results:   MRI foot/forefoot toest right wo contrast  Result Date: 6/18/2025  Impression: Postsurgical changes of partial fourth and fifth ray resections with a soft tissue ulcer overlying the cut surfaces of the fourth and fifth metatarsals and findings concerning for early osteomyelitis in the residual fourth metatarsal. No definite MRI evidence of osteomyelitis. The ulcer is also in close approximation to the distal third metatarsal, and early osteomyelitis in that location cannot be excluded. No evidence of an abscess. The study was marked in EPIC for immediate notification. Workstation performed: [id number]     XR chest portable  Result Date: 6/18/2025  Impression: No acute cardiopulmonary disease. Right jugular catheter in mid SVC with no pneumothorax. Workstation performed: [id number]     XR foot 3+ views RIGHT  Result Date: 6/17/2025  Impression: Postop changes of the fourth and fifth metatarsals. No x-ray evidence of osteomyelitis at this time. Computerized Assisted Algorithm (CAA) may have been used to analyze all applicable images. Workstation performed: [id number]     XR tibia fibula 2 views RIGHT  Result Date: 6/17/2025  Impression: Postop changes of the fourth and fifth metatarsals. No x-ray evidence of osteomyelitis at this time. Computerized Assisted Algorithm (CAA) may have been used to analyze all applicable images. Workstation performed: [id number]      XR chest portable  Result Date: 6/17/2025  Impression: No acute cardiopulmonary disease. Workstation performed: [id number]         Incidental Findings:   none     Hospital Course:   Gold Van is a 79 y.o. male patient who originally presented to the hospital on 6/17/2025 due to lethargy, hypotenstion, and fever at wound care appointment. Please see H&P for complete details of presentation. The patient was found to be in septic shock, metabolic acidosis, and GERDA in the ED. He had refractory hypotension despite 3 L IV fluid and was started on vasopressors. The patient was admitted to critical care service and started on IV abx and IV fluids. He continued to require vasopressors. He was also started on stress dose steroids. Podiatry consulted and MRI right foot ordered which showed findings concerning for early osteomyelitis in the residual fourth metatarsal. He was weaned off pressors 6/18 and abx switched to vanco/zosyn. SDS were discontinued and patient was started on his home does prednisone. Given improvement of the patient he was switched to SLIM service on 6/18. GERDA resolved. The patient taken to the OR on 6/23/25 for a TMA right foot. Wound culture grew Pseudomonas and Serratia, in addition to MSSA. Patient was noted to have 1 of 2 blood cultures positive for MSSA. ID consulted and felt given evidence of soft tissue infection, growth of MSSA in soft tissue culture and septic shock on presentation, this is most likely true bacteremia. IV abx were de-escalated to IV Cefepime. The patient underwent TTE without obvious vegetation. No need for DENISE per ID. Repeat blood cultures were negative. PICC line was placed on 6/30/25. PT/OT recommended rehab. The patient was accepted at [facility] on 6/30/25 and the patient was medically stable at that time. CM submitted for insurance auth. Insurance denied [facility]. A [id number] was completed and still was denied. The patient's family submitted for an appeal. Insurance did  overturn the denial on 7/5. The patient was discharged to [facility] [place]. The patient was instructed to follow-up with podiatry and ID. This is a brief discharge summary, please see notes from throughout the patient's hospital stay for complete details of his hospitalization.       Please see above list of diagnoses and related plan for additional information.     Discharge Day Visit / Exam:   Subjective:    Vitals: Blood Pressure: 133/69 (07/05/25 0733)  Pulse: 87 (07/05/25 0733)  Temperature: 98.5 °F (36.9 °C) (07/05/25 0733)  Temp Source: Oral (07/05/25 0733)  Respirations: 18 (07/05/25 0733)  Height: 6' (182.9 cm) (06/26/25 1030)  Weight - Scale: 62.1 kg (137 lb) (06/26/25 1030)  SpO2: 97 % (07/05/25 0733)  Physical Exam  Vitals and nursing note reviewed.   Constitutional:       Appearance: Normal appearance.   HENT:      Head: Normocephalic and atraumatic.      Nose: Nose normal.      Mouth/Throat:      Mouth: Mucous membranes are moist.      Pharynx: Oropharynx is clear.     Cardiovascular:      Pulses: Normal pulses.      Heart sounds: Normal heart sounds.   Pulmonary:      Effort: Pulmonary effort is normal.      Breath sounds: Normal breath sounds.   Abdominal:      General: There is no distension.      Palpations: Abdomen is soft.      Tenderness: There is no abdominal tenderness.     Skin:     General: Skin is warm and dry.     Neurological:      General: No focal deficit present.      Mental Status: He is alert and oriented to person, place, and time.     Psychiatric:         Mood and Affect: Mood normal.         Behavior: Behavior normal.         Thought Content: Thought content normal.          Discussion with Family: Updated  (wife) via phone.    Discharge instructions/Information to patient and family:   See after visit summary for information provided to patient and family.      Provisions for Follow-Up Care:  See after visit summary for information related to follow-up care and any pertinent  home health orders.      Mobility at time of Discharge:   Basic Mobility Inpatient Raw Score: 12  JH-HLM Goal: 4: Move to chair/commode  JH-HLM Achieved: 4: Move to chair/commode  HLM Goal achieved. Continue to encourage appropriate mobility.     Disposition:   Acute Rehab at [facility]    Planned Readmission: no    Administrative Statements   Discharge Statement:  I have spent a total time of 25 minutes in caring for this patient on the day of the visit/encounter. >30 minutes of time was spent on: Patient and family education, Counseling / Coordination of care, and Documenting in the medical record.    **Please Note: This note may have been constructed using a voice recognition system**

## 2025-07-05 NOTE — ASSESSMENT & PLAN NOTE
Lab Results   Component Value Date    HGBA1C 6.6 (H) 06/18/2025       Recent Labs     07/04/25  1544 07/04/25  2042 07/05/25  0734 07/05/25  1119   POCGLU 302* 194* 132 183*       Blood Sugar Average: Last 72 hrs:  (P) [id number]  Prehospital metformin and glipizide held on admission, resume on discharge  CHO diet  Continue to monitor blood sugars and adjust regimen accordingly

## 2025-07-05 NOTE — ASSESSMENT & PLAN NOTE
RLE NWB  PT OT-recommend rehab placement  Patient accepted at [facility]

## 2025-07-05 NOTE — ASSESSMENT & PLAN NOTE
Minimal  Mostly in sacrum  > Tylenol PRN  > Oxycodone 2.5-5mg Q4hr PRN  > Monitor and wean as tolerated

## 2025-07-05 NOTE — ASSESSMENT & PLAN NOTE
POA   >Frequent off loading  Turn patient Q2hr in bed  Specialty cushion when OOB   Local care as per wound care   Consulted nutrition/wound care for continuity  Outpatient f/u with wound care clinic

## 2025-07-05 NOTE — PROGRESS NOTES
PHYSICAL MEDICINE AND REHABILITATION   PREADMISSION ASSESSMENT     Projected IGC and Rehabilitation Diagnoses:  Impairment of mobility, safety and Activities of Daily Living (ADLs) due to Other Disabling Impairments:  13  Other Disabling Impairments  Etiologic: Diabetic ulcer right midfoot s/p right TMA  Date of Onset: 6/17/25   Date of surgery: 6/23/25-s/p right TMA    PATIENT INFORMATION  Name: Gold Van Phone #: [phone] (home)   Address: [address]  [place] [address]  YOB: 1945 Age: 79 y.o. #   Marital Status: /Civil Union  Ethnicity: white  Employment Status: retired  Extended Emergency Contact Information  Primary Emergency Contact: Mya Van  Address: [address]           [place] [address] [facility]  Home Phone: [phone]  Mobile Phone: [phone]  Relation: Spouse  Advance Directive: Level 1 Full Code (has ACP docs)     INSURANCE/COVERAGE:     Primary Payor: [employer] REP / Plan: [employer]  REP / Product Type: Medicare PPO /  #[id number]  Secondary Payer: Self pay    Payer Contact: Lizzette Payer Contact:   Contact Phone: [phone] Contact Phone:     Authorization #:  [id number]   Coverage Dates: 7/5/25-7/8/25  LCD: 7/8/25  Submit Next Review to: [employer] portal or f: [phone]   MEDICARE #: [id number]   Medical Record #: [id number]    REFERRAL SOURCE:   Referring provider: Ronnell House MD  Referring facility: [facility]  Room: /-01  PCP: Shayne Diaz MD PCP phone number: [phone]    MEDICAL INFORMATION  HPI: This is a 79 year old male who presented to [facility] ER on 6/17/25 from his wound care appointment after being found lethargic, hypotensive and with fever.  Patient has a history significant for chronic right foot osteomyelitis and is s/p amputations of the 4th and 5th toe in May of 2025.  In the ER he was found to be in septic  shock requiring vasopressors, metabolic acidosis, and GERDA.  GERDA likely prerenal in setting of shock state.  He had episodes of atrial fibrillation with RVR on 6/21 and was treated with 3 doses of metoprolol IV followed by Cardizem.  No further episodes.  He is to continue on pre-hospital Eliquis.  MRI of right foot showed postsurgical changes of partial fourth and fifth ray resections with a soft tissue ulcer overlying the cut surfaces of the fourth and fifth metatarsals and findings concerning for early osteomyelitis in the residual fourth metatarsal. No definite MRI evidence of osteomyelitis.  The ulcer is also in close approximation to the distal third metatarsal, and early osteomyelitis in that location cannot be excluded.  No evidence of an abscess with early osteo. Podiatry was consulted.  He is s/p right TMA on 6/23.  NWB RLE.  Operative cultures were obtained which grew serratia, pseudomonas, and MSSA.  There was not believed to be surgical cure of the osteomyelitis.  Additionally patient's admission blood cultures also showing MSSA in one set.  ID was consulted.  Plan is for patient to continue on IV cefepime through 8/6/2025 for 6 weeks of IV antibiotic after cleared blood cultures and in setting of right foot osteomyelitis.  Weekly CBC and creatinine while on IV antibiotic.  RUE PICC placed 6/30.  PT and OT have been consulted and are recommending post-acute rehab services.   Patient's case has been reviewed and patient meets medical criteria for acute rehab and has demonstrated the ability to tolerate three or more hours of therapy per day. Patient is medically stable and ready for discharge to [facility].        Past Medical History:   Past Surgical History:   Allergies:     Past Medical History[1] Past Surgical History[2]  Allergies   Allergen Reactions    Ciprofloxacin Shortness Of Breath    Sulfamethoxazole Shortness Of Breath    Trimethoprim Shortness Of Breath    Dexamethasone Other (See Comments)     Triamcinolone Other (See Comments)    Bactrim [Sulfamethoxazole-Trimethoprim] Fever     Fever of 107         Medical/functional conditions requiring inpatient rehabilitation:   Diabetic foot ulcer s/p right TMA  Septic shock  Acute renal failure superimposed on CKD  DM2  HTN  HLD  Hyponatremia  Pruritic condition  GERD  Severe PAD   Anemia  Pressure injury of sacral region  Metabolic acidosis  Impaired self care  Impaired mobility    Risk for medical/clinical complications: risk for falls, risk for skin breakdown, risk for decreased healing, risk for complications with incision, risk for infection, risk for uncontrolled pain, risk for DVT/PE     Comorbidities:  Severe PAD  PAF on Eliquis  HTN  Hyperlipidemia  Poorly controlled DM2  GERD  COPD/asthma  CKD  H/o CVA  Chronic hyponatremia      Surgeries in the last 100 days:   5/16/25-s/p right partial 5th ray resection  5/23/25-s/p right partial 4th ray amputation  6/23/25-s/p right TMA    CURRENT VITAL SIGNS:   Temp:  [97.6 °F (36.4 °C)-98.5 °F (36.9 °C)] 98.5 °F (36.9 °C)  HR:  [63-87] 87  BP: ()/(53-72) 133/69  Resp:  [18-20] 18  SpO2:  [96 %-99 %] 97 %  O2 Device: None (Room air)   Intake/Output Summary (Last 24 hours) at 7/5/2025 1147  Last data filed at 7/5/2025 0900  Gross per 24 hour   Intake 1110 ml   Output 1550 ml   Net -440 ml        LABORATORY RESULTS:      Lab Results   Component Value Date    HGB 9.2 (L) 07/04/2025    HGB 13.4 03/06/2014    HCT 29.2 (L) 07/04/2025    HCT 38.0 03/06/2014    WBC 5.51 07/04/2025    WBC 7.04 03/06/2014     Lab Results   Component Value Date    BUN 39 (H) 07/04/2025    BUN 19 06/02/2025     04/14/2015    K 4.1 07/04/2025    K 4.5 06/02/2025     07/04/2025     06/02/2025    GLUCOSE 262 (H) 05/13/2025    GLUCOSE 179 (H) 04/14/2015    CREATININE 0.77 07/04/2025    CREATININE 0.74 06/02/2025     Lab Results   Component Value Date    PROTIME 28.7 (H) 06/18/2025    INR 2.68 (H) 06/18/2025        DIAGNOSTIC  STUDIES:  MRI foot/forefoot toest right wo contrast  Result Date: 6/18/2025  Impression: Postsurgical changes of partial fourth and fifth ray resections with a soft tissue ulcer overlying the cut surfaces of the fourth and fifth metatarsals and findings concerning for early osteomyelitis in the residual fourth metatarsal. No definite MRI evidence of osteomyelitis. The ulcer is also in close approximation to the distal third metatarsal, and early osteomyelitis in that location cannot be excluded. No evidence of an abscess. The study was marked in EPIC for immediate notification. Workstation performed: [id number]     XR chest portable  Result Date: 6/18/2025  Impression: No acute cardiopulmonary disease. Right jugular catheter in mid SVC with no pneumothorax. Workstation performed: [id number]     XR foot 3+ views RIGHT  Result Date: 6/17/2025  Impression: Postop changes of the fourth and fifth metatarsals. No x-ray evidence of osteomyelitis at this time. Computerized Assisted Algorithm (CAA) may have been used to analyze all applicable images. Workstation performed: [id number]     XR tibia fibula 2 views RIGHT  Result Date: 6/17/2025  Impression: Postop changes of the fourth and fifth metatarsals. No x-ray evidence of osteomyelitis at this time. Computerized Assisted Algorithm (CAA) may have been used to analyze all applicable images. Workstation performed: [id number]     XR chest portable  Result Date: 6/17/2025  Impression: No acute cardiopulmonary disease. Workstation performed: [id number]       PRECAUTIONS/SPECIAL NEEDS:  Weight Bearing Precautions:  NWB R LE, Anticoagulation:  Eliquis and Plavix, Blood Sugar Management: per MD orders, Edema Management, Safety Concerns, Pain Management, Dietary Restrictions: Cardiac, Language Preference: English, and Fall precaution    MEDICATIONS:   Current Medications[3]    SKIN INTEGRITY:   Wound 06/17/25 Pressure Injury Buttocks Mid;Right    Wound 06/17/25 Pressure Injury  Buttocks Left    Wound 06/18/25 Groin    Wound 06/18/25 Pretibial Right    Wound 06/23/25 Traumatic Skin Tear Knee Anterior;Right      PRIOR LEVEL OF FUNCTION:  He lives in a(n) single family home  Gold Van is  and lives with their spouse.  Self Care: Independent, Indoor Mobility: Independent, Stairs (in/outdoor): Independent, and Cognition: Independent    FALLS IN THE LAST 6 MONTHS: 0    HOME ENVIRONMENT:  The living area: can live on one level  There are No steps to enter the home through garage but then up 6+7 steps to main floor.  The patient will have 24 hour supervision/physical assistance available upon discharge.    PREVIOUS DME:  Equipment in home (previous DME): Shower Chair, Grab Bars, Rolling Walker, and Single Point Cane    FUNCTIONAL STATUS:  Physical Therapy Occupational Therapy Speech Therapy   07/02/25 1024    PT Last Visit   PT Visit Date 07/02/25   Note Type   Note Type Treatment   Pain Assessment   Pain Assessment Tool 0-10   Pain Score No Pain   Restrictions/Precautions   Weight Bearing Precautions Per Order Yes   RLE Weight Bearing Per Order NWB   Other Precautions Chair Alarm;Bed Alarm;WBS;Fall Risk;Pain  (decreased skin integrity)   General   Family/Caregiver Present Yes   Cognition   Overall Cognitive Status WFL   Arousal/Participation Alert;Responsive;Cooperative   Attention Within functional limits   Orientation Level Oriented X4   Memory Decreased recall of precautions   Following Commands Follows one step commands without difficulty   Comments pt agreeable to PT treatment   Bed Mobility   Supine to Sit 5  Supervision   Additional items Assist x 1;HOB elevated;Bedrails;Increased time required;Verbal cues   Additional Comments VC for bedrail utilization and proper body mechanics; denied dizziness with transitional movements   Transfers   Sit to Stand 3  Moderate assistance   Additional items Assist x 2;Increased time required;Verbal cues   Stand to Sit 3  Moderate assistance  "  Additional items Assist x 2;Increased time required;Verbal cues   Stand pivot 3  Moderate assistance   Additional items Assist x 2;Increased time required;Verbal cues   Toilet transfer 3  Moderate assistance   Additional items Assist x 2;Increased time required;Commode;Verbal cues   Additional Comments RW used; VC for safe hand placement, proper body mechanics and overall RW management during directional turns. Leg  utilized to lift RLE up to maintain NWB status - fair compliance   Ambulation/Elevation   Gait pattern Decreased foot clearance;Short stride;Excessively slow  (\"hopping\")   Gait Assistance 3  Moderate assist   Additional items Assist x 2;Verbal cues;Tactile cues   Assistive Device Rolling walker   Distance 2-3 hops   Ambulation/Elevation Additional Comments leglifter used to assist in NWB   Balance   Static Sitting Fair +   Dynamic Sitting Fair   Static Standing Poor +   Dynamic Standing Poor   Ambulatory Poor -   Endurance Deficit   Endurance Deficit Yes   Endurance Deficit Description pt reports fatigue with mobility   Activity Tolerance   Activity Tolerance Patient limited by fatigue;Patient limited by pain;Other (Comment)  (NWB status, decreased skin integrity)   Nurse Made Aware nursing staff made aware of outcomes   Exercises   Quad Sets Sitting;AROM;Bilateral  (30 reps)   Heelslides Sitting;AROM;Bilateral  (30 reps)   Hip Abduction Sitting;AROM;Bilateral  (30 reps)   Hip Adduction Sitting;AROM;Bilateral  (30 reps)   Knee AROM Long Arc Quad Sitting;AROM;Bilateral  (30 reps)   Ankle Pumps Sitting;AROM;Bilateral  (30 reps)   Marching Sitting;AROM;Bilateral  (30 reps)   Assessment   Prognosis Fair   Problem List Decreased strength;Decreased endurance;Impaired balance;Decreased mobility;Decreased skin integrity;Pain   Assessment Pt seen for PT treatment session this date with interventions consisting of gait training w/ emphasis on improving pt's ability to ambulate level surfaces x 2-3 hops " "with mod A of 2 provided by therapist with RW, Therapeutic exercise consisting of: AROM 2 sets of 15 reps B LE in sitting position, and therapeutic activity consisting of training: bed mobility, supine<>sit transfers, sit<>stand transfers, stand pivot transfers towards B direction, and commode transfer . Pt agreeable to PT treatment session upon arrival, pt found supine in bed w/ HOB elevated, in no apparent distress and responsive. In comparison to previous session, pt with no improvements as evidenced by fair compliance with NWB status . Post session: pt returned back to recliner, chair alarm engaged, all needs in reach, and RN notified of session findings/recommendations. Continue to recommend Level I (Maximum Resource Intensity) at time of d/c in order to maximize pt's functional independence and safety w/ mobility. Pt continues to be functioning below baseline level. PT will continue to see pt during current hospitalization in order to address the deficits listed above and provide interventions consistent w/ POC in effort to achieve STGs.    Nai Lynn, PTA    07/02/25 0946    OT Last Visit   OT Visit Date 07/02/25   Note Type   Note Type Treatment   Pain Assessment   Pain Assessment Tool 0-10   Pain Score No Pain   Restrictions/Precautions   Weight Bearing Precautions Per Order Yes   RLE Weight Bearing Per Order NWB   Other Precautions Chair Alarm;Bed Alarm;WBS;Fall Risk;Pain  (decreased skin integrity)   Lifestyle   Autonomy Pt resides in two level house w/ spouse; I with ADLs, mobility and IADLs at baseline; +    Reciprocal Relationships supportive family   Service to Others retired -    Intrinsic Gratification just wants to get home with my wife , \"she's been dealing with this for 2 months\" > visiting him, etc.   ADL   Where Assessed Commode   LB Dressing Assistance 3  Moderate Assistance   LB Dressing Deficit Don/doff R sock;Don/doff L sock   LB Dressing Comments mod A while " seated on commode   Toileting Assistance  2  Maximal Assistance   Toileting Deficit Perineal hygiene;Increased time to complete;Supervison/safety   Toileting Comments perineal and perianal hygiene while in stance with max A for completion; second assist for balance in stance   Functional Standing Tolerance   Time 2 mins   Activity perianal hygiene   Comments while in stance with min to mod A x2 w/ RW   Bed Mobility   Supine to Sit 5  Supervision   Additional items Assist x 1;HOB elevated;Bedrails;Increased time required;Verbal cues   Sit to Supine    (DNT; pt seated in recliner upon conclusion of session)   Additional Comments VC for bedrail utilization and proper body mechanics; denied dizziness with transitional movements   Transfers   Sit to Stand 3  Moderate assistance   Additional items Assist x 2;Increased time required;Verbal cues   Stand to Sit 3  Moderate assistance   Additional items Assist x 2;Increased time required;Verbal cues   Stand pivot 3  Moderate assistance   Additional items Assist x 2;Increased time required;Verbal cues   Toilet transfer 3  Moderate assistance   Additional items Assist x 2;Increased time required;Commode;Verbal cues   Additional Comments RW used; VC for safe hand placement, proper body mechanics and overall RW management during directional turns. Leg  utilized to lift RLE up to maintain NWB status - fair compliance   Toilet Transfers   Toilet Transfer From Bed   Toilet Transfer Type To and from   Toilet Transfer to Standard bedside commode   Toilet Transfer Technique Stand pivot   Toilet Transfers Moderate assistance   Toilet Transfers Comments mod A x2 w/ RW   Therapeutic Excerise-Strength   UE Strength Yes   Right Upper Extremity- Strength   R Shoulder Flexion;Extension  (protraction/retraction)   R Elbow Elbow flexion;Elbow extension   R Position Seated   Equipment Dumbbell   R Weight/Reps/Sets 5# - 1x15 reps   RUE Strength Comment visual and verbal cues for exercise  "technique and sequence   Left Upper Extremity-Strength   L Shoulder Flexion;Extension  (protraction/retraction)   L Elbow Elbow flexion;Elbow extension   L Position Seated   L Weights/Reps/Sets 5# - 1x15 reps   LUE Strength Comment visual and verbal cues for exercise technique and sequence   Subjective   Subjective \"It is hard to keep my leg up\"   Cognition   Overall Cognitive Status WFL   Arousal/Participation Alert;Responsive;Cooperative   Attention Within functional limits   Orientation Level Oriented X4   Memory Decreased recall of precautions   Following Commands Follows one step commands without difficulty   Comments Pt agreeable to OT treatment, pleasant   Activity Tolerance   Activity Tolerance Patient tolerated treatment well   Medical Staff Made Aware Yes, RN made aware of session outcomes   Assessment   Assessment Patient participated in Skilled OT session this date with interventions consisting of ADL re training with the use of correct body mechnaics, Energy Conservation techniques, safety awareness and fall prevention techniques, therapeutic exercise to: increase functional use of BUEs, increase BUE muscle strength ,  therapeutic activities to: increase activity tolerance, increase postural control, increase trunk control, and increase OOB/ sitting tolerance . Patient agreeable to OT treatment session, upon arrival patient was found supine in bed.  In comparison to previous session, patient with improvements in functional transfers, activity tolerance and exercise tolerance . Patient requiring verbal cues for correct technique and frequent rest periods. Patient continues to be functioning below baseline level, occupational performance remains limited secondary to factors listed above and increased risk for falls and injury.   From OT standpoint, recommendation at time of d/c would with max intensity OT resources.   Patient to benefit from continued Occupational Therapy treatment while in the hospital " to address deficits as defined above and maximize level of functional independence with ADLs and functional mobility.         CARE SCORES:  Self Care:  Eatin: Setup or clean-up assistance  Groomin: Not applicable  Shower/bathing self: 09: Not applicable  Upper body dressin: Not applicable  Lower body dressin: Partial/moderate assistance  Putting on/taking off footwear: 03: Partial/moderate assistance  Toilet hygiene: 02: Substantial/maximal assistance   Transfers:  Roll left and right: 09: Not applicable  Sit to lyin: Not applicable  Lying to sitting on side of bed: 04: Supervision or touching  assistance  Sit to stand: 03: Partial/moderate assistance (mod x 2)  Chair/bed to chair transfer: 03: Partial/moderate assistance (mod x 2)  Toilet transfer: 03: Partial/moderate assistance (mod x 2)  Mobility:  Walk 10 ft: 88: Not attempted due to medical conditions or safety concerns  Walk 50 ft with two turns: 88: Not attempted due to medical conditions or safety concerns  Walk 150ft: 88: Not attempted due to medical conditions or safety concerns    CURRENT GAP IN FUNCTION  Prior to Admission: At his true baseline he is independent with ADLS and mobility. Family performed IADLS. Recently has been requiring increased A as [facility]     Expected functional outcomes: It is expected that with skilled acute rehabilitation services the patient will progress to Independent for self care and Independent for mobility     Estimated length of stay: 10 to 14 days    Anticipated Post-Discharge Disposition/Treatment  Disposition: Return to previous home/apartment.  Outpatient Services: Physical Therapy (PT) and Occupational Therapy (OT)    BARRIERS TO DISCHARGE  Weakness, Pain, Balance Difficulty, Fatigue, Home Accessibility, Caregiver Accessibility, and Equipment Needs    INTERVENTIONS FOR DISCHARGE  Adaptive equipment, Patient/Family/Caregiver Education, Community Resources, Arrange DME  needs, Therapy exercises, and Energy conservation education     REQUIRED THERAPY:  Patient will require PT and OT 90 minutes each per day, five days per week to achieve rehab goals.     REQUIRED FUNCTIONAL AND MEDICAL MANAGEMENT FOR INPATIENT REHABILITATION:  Skin:  monitor skin for breakdown, Pain Management: Overall pain is moderately controlled, Deep Vein Thrombosis (DVT) Prophylaxis:  per MD orders, Diabetes Management: continue sliding scale insulin, patient to do finger sticks as ordered, SLIM to continue to manage diabetes, further internal medicine management of additional medical conditions while on ARC, PT/OT intervention, patient/family education and training, and any needed consults PRN.    RECOMMENDED LEVEL OF CARE:   This is a 79 year old male who presented to [facility] ER on 6/17/25 from his wound care appointment after being found lethargic, hypotensive and with fever.  Patient has a history significant for chronic right foot osteomyelitis and is s/p amputations of the 4th and 5th toe in May of 2025.  In the ER he was found to be in septic shock requiring vasopressors, metabolic acidosis, and GERDA.  GERDA likely prerenal in setting of shock state.  He had episodes of atrial fibrillation with RVR on 6/21 and was treated with 3 doses of metoprolol IV followed by Cardizem.  No further episodes.  He is to continue on pre-hospital Eliquis.  MRI of right foot showed postsurgical changes of partial fourth and fifth ray resections with a soft tissue ulcer overlying the cut surfaces of the fourth and fifth metatarsals and findings concerning for early osteomyelitis in the residual fourth metatarsal. No definite MRI evidence of osteomyelitis.  The ulcer is also in close approximation to the distal third metatarsal, and early osteomyelitis in that location cannot be excluded.  No evidence of an abscess with early osteo. Podiatry was consulted.  He is s/p right TMA on 6/23.  NWB RLE.  Operative cultures  were obtained which grew serratia, pseudomonas, and MSSA.  There was not believed to be surgical cure of the osteomyelitis.  Additionally patient's admission blood cultures also showing MSSA in one set.  ID was consulted.  Plan is for patient to continue on IV cefepime through 8/6/2025 for 6 weeks of IV antibiotic after cleared blood cultures and in setting of right foot osteomyelitis.  Weekly CBC and creatinine while on IV antibiotic.  RUE PICC placed 6/30.  PT and OT have been consulted and are recommending post-acute rehab services.   Patient's case has been reviewed and patient meets medical criteria for acute rehab and has demonstrated the ability to tolerate three or more hours of therapy per day. Patient is medically stable and ready for discharge to [facility].      At his true baseline he was independent with ADLs and mobility.  Family performed IADLs.  He has recently has been requiring increased A as he’s been at [facility] after his surgery in May of 2025.  He is now functioning below his baseline requiring mod to max assistance to complete his ADLs.  With PT, he is requiring supervision for bed mobility and mod x 2 assistance for transfers.  He ambulated 2-3 hops with a RW and mod x 2 assistance.  Gait pattern: decreased foot clearance, short stride and excessively slow.    Patient requires close oversight by a physician, PM&R management, 24/7 rehabilitation nursing care and specialized interdisciplinary therapy services in preparation for discharge which can only be provided in the inpatient acute rehabilitation setting.  While on [facility], this patient will need close monitoring of his VS, I/Os, skin, pain level, right TMA incision, blood sugars, labs, RUE PICC line and his overall condition.  Inpatient acute rehabilitation is recommended for this patient to maximize his overall strength, endurance, self-care and mobility upon discharge back to his home with the support of his wife.       [1]   Past Medical  History:  Diagnosis Date    Asthma     COPD (chronic obstructive pulmonary disease) ([facility])     COVID-19     CVA (cerebral vascular accident) ([facility])     Diabetes mellitus ([facility])     Environmental allergies     Hyperlipidemia     Hypertension     Macular degeneration 07/13/2020    right eye    Orthostatic hypotension     Osteopenia     Pneumonia     Pneumothorax     Sinusitis    [2]   Past Surgical History:  Procedure Laterality Date    CARPAL TUNNEL RELEASE Left 08/2024    CATARACT EXTRACTION Left 07/2024    COLONOSCOPY      IR PICC PLACEMENT SINGLE LUMEN  6/30/2025    KNEE CARTILAGE SURGERY Right 1964    [place] AMPUTATION FOOT TRANSMETARSAL Right 6/23/2025    Procedure: AMPUTATION TRANSMETATARSAL (TMA);  Surgeon: Agustin Galeas DPM;  Location: [facility];  Service: Podiatry    [place] AMPUTATION METATARSAL W/TOE SINGLE Right 05/16/2025    Procedure: RAY RESECTION FOOT - R partial 5th ray resection;  Surgeon: Reinaldo Brewer DPM;  Location: [facility];  Service: Podiatry    [place] AMPUTATION METATARSAL W/TOE SINGLE Right 05/23/2025    Procedure: Right partial 4th ray amputation, wound debridement;  Surgeon: Brandon Phillips DPM;  Location: [facility];  Service: Podiatry    [place] TEAEC W/WO PATCH GRAFT COMMON FEMORAL Right 05/13/2025    Procedure: Right common femoral endarterectomy with bovine pericardial patch Right lower extremity angiogram Third order cannulation of the right anterior tibial artery Balloon angioplasty of the right anterior tibial with a 3x220 Fernando balloon DCB angioplasty of the SFA with a 6x150 [place] balloon Stenting of the right SFA with two 6x150 Grazyna stents, 6x5cm Viabahn, and a 7x5cm viabahn Post-di    VASECTOMY      WISDOM TOOTH EXTRACTION      x4   [3]   Current Facility-Administered Medications:     acetaminophen (TYLENOL) tablet 650 mg, 650 mg, Oral, Q6H PRN, Benny Zapata DPM, 650 mg at 06/28/25 1218    albuterol (PROVENTIL HFA,VENTOLIN HFA) inhaler 2 puff, 2 puff, Inhalation, Q4H PRNBenny  LISA Zapata, 2 puff at 06/20/25 0522    albuterol inhalation solution 2.5 mg, 2.5 mg, Nebulization, Q6H PRN, Benny Zapata DPM, 2.5 mg at 06/22/25 1313    apixaban (ELIQUIS) tablet 5 mg, 5 mg, Oral, BID, BELGICA Thompson, 5 mg at 07/05/25 0859    atenolol (TENORMIN) tablet 25 mg, 25 mg, Oral, Daily, BELGICA Nelson, 25 mg at 07/05/25 0859    atorvastatin (LIPITOR) tablet 40 mg, 40 mg, Oral, QPM, Benny Zapata DPM, 40 mg at 07/04/25 1729    Budeson-Glycopyrrol-Formoterol 160-9-4.8 MCG/ACT AERO 2 puff, 2 puff, Inhalation, Q12H SANDRA, Benny Zapata DPM, 2 puff at 07/05/25 0901    ceFEPime (MAXIPIME) 2,000 mg in dextrose 5 % 50 mL IVPB, 2,000 mg, Intravenous, Q8H, BELGICA Murphy, Last Rate: 100 mL/hr at 07/05/25 1037, 2,000 mg at 07/05/25 1037    clopidogrel (PLAVIX) tablet 75 mg, 75 mg, Oral, Daily, BELGICA Thompson, 75 mg at 07/05/25 0859    diltiazem (CARDIZEM) injection 15 mg, 15 mg, Intravenous, Once, Benny Zapata DPM    diphenhydrAMINE (BENADRYL) injection 25 mg, 25 mg, Intravenous, Q6H PRN, Benny Zapata DPM    famotidine (PEPCID) tablet 20 mg, 20 mg, Oral, Daily, Benny Zapata DPM, 20 mg at 07/05/25 0859    heparin (porcine) injection 1,800 Units, 1,800 Units, Intravenous, Q6H PRN, BELGICA Thompson    heparin (porcine) injection 3,600 Units, 3,600 Units, Intravenous, Q6H PRN, BELGICA Thompson, 3,600 Units at 06/24/25 0643    HYDROmorphone HCl (DILAUDID) injection 0.2 mg, 0.2 mg, Intravenous, Q2H PRN, Benny Zapata, LISA, 0.2 mg at 06/23/25 2012    insulin glargine (LANTUS) subcutaneous injection 12 Units 0.12 mL, 12 Units, Subcutaneous, HS, BELGICA Nelson, 12 Units at 07/04/25 2136    insulin lispro (HumALOG/ADMELOG) 100 units/mL subcutaneous injection 1-5 Units, 1-5 Units, Subcutaneous, TID AC, 3 Units at 07/04/25 1731 **AND** Fingerstick Glucose (POCT), , , TID AC, BELGICA Thompson    insulin lispro (HumALOG/ADMELOG) 100 units/mL subcutaneous injection 1-5 Units, 1-5 Units,  Subcutaneous, HS, Molly Anderson, BELGICA, 1 Units at 07/04/25 2135    insulin lispro (HumALOG/ADMELOG) 100 units/mL subcutaneous injection 4 Units, 4 Units, Subcutaneous, TID With Meals, BELGICA Nelson, 4 Units at 07/05/25 0858    lisinopril (ZESTRIL) tablet 5 mg, 5 mg, Oral, Daily, BELGICA Nelson, 5 mg at 07/05/25 0859    moisture barrier miconazole 2% cream (aka CALEB MOISTURE BARRIER ANTIFUNGAL CREAM), , Topical, BID, Benny Zapata DPM, Given at 07/05/25 0900    montelukast (SINGULAIR) tablet 10 mg, 10 mg, Oral, HS, Benny Zapata DPM, 10 mg at 07/04/25 2138    multivitamin-minerals (CENTRUM) tablet 1 tablet, 1 tablet, Oral, Daily, Benny Zapata DPM, 1 tablet at 07/05/25 0859    naloxone (NARCAN) 0.04 mg/mL syringe 0.04 mg, 0.04 mg, Intravenous, Q1MIN PRN, Benny Zapata DPM    ondansetron (ZOFRAN) injection 4 mg, 4 mg, Intravenous, Q6H PRN, Benny Zapata DPM    oxyCODONE (ROXICODONE) split tablet 2.5 mg, 2.5 mg, Oral, Q4H PRN **OR** oxyCODONE (ROXICODONE) IR tablet 5 mg, 5 mg, Oral, Q4H PRN, Benny Zapata DPM, 5 mg at 06/29/25 0903    polyethylene glycol (MIRALAX) packet 17 g, 17 g, Oral, Daily PRN, Benny Zapata DPM    predniSONE tablet 5 mg, 5 mg, Oral, Daily, Benny Zapata DPM, 5 mg at 07/05/25 0859    senna-docusate sodium (SENOKOT S) 8.6-50 mg per tablet 2 tablet, 2 tablet, Oral, HS, Benny Zapata DPM, 2 tablet at 06/29/25 2110

## 2025-07-05 NOTE — PLAN OF CARE
Problem: SAFETY ADULT  Goal: Patient will remain free of falls  Description: INTERVENTIONS:  - Educate patient/family on patient safety including physical limitations  - Instruct patient to call for assistance with activity   - Consider consulting OT/PT to assist with strengthening/mobility based on AM PAC & JH-HLM score  - Consult OT/PT to assist with strengthening/mobility   - Keep Call bell within reach  - Keep bed low and locked with side rails adjusted as appropriate  - Keep care items and personal belongings within reach  - Initiate and maintain comfort rounds  - Make Fall Risk Sign visible to staff  -- Apply yellow socks and bracelet for high fall risk patients  - Consider moving patient to room near nurses station  Outcome: Progressing  Goal: Maintain or return to baseline ADL function  Description: INTERVENTIONS:  -  Assess patient's ability to carry out ADLs; assess patient's baseline for ADL function and identify physical deficits which impact ability to perform ADLs (bathing, care of mouth/teeth, toileting, grooming, dressing, etc.)  - Assess/evaluate cause of self-care deficits   - Assess range of motion  - Assess patient's mobility; develop plan if impaired  - Assess patient's need for assistive devices and provide as appropriate  - Encourage maximum independence but intervene and supervise when necessary  - Involve family in performance of ADLs  - Assess for home care needs following discharge   - Consider OT consult to assist with ADL evaluation and planning for discharge  - Provide patient education as appropriate  - Monitor functional capacity and physical performance, use of AM PAC & JH-HLM   - Monitor gait, balance and fatigue with ambulation    Outcome: Progressing  Goal: Maintains/Returns to pre admission functional level  Description: INTERVENTIONS:  - Perform AM-PAC 6 Click Basic Mobility/ Daily Activity assessment daily.  - Set and communicate daily mobility goal to care team and  patient/family/caregiver.   - Collaborate with rehabilitation services on mobility goals if consulted  -- Out of bed for toileting  - Record patient progress and toleration of activity level   Outcome: Progressing

## 2025-07-05 NOTE — ASSESSMENT & PLAN NOTE
L ankle weakness in both PF/DF/EHL  Unclear if related to previous CVA in 2024 (CVA in setting of COPD exacerbation, but presented like impaired coordination)  Also with peripheral neuropathy  Denies back/radicular pain.  Was present in last [facility] admission in 2024.   Previously attempted to get AFO, but he declined it due to copay.   > Outpatient f/u with podiatry

## 2025-07-05 NOTE — ASSESSMENT & PLAN NOTE
Lab Results   Component Value Date    EGFR 86 07/04/2025    EGFR 86 07/02/2025    EGFR 86 07/01/2025    CREATININE 0.77 07/04/2025    CREATININE 0.77 07/02/2025    CREATININE 0.77 07/01/2025     POA with creatinine 1.49  Likely prerenal in setting of shock state which is now resolved  Creatinine currently at baseline  GERDA resolved

## 2025-07-05 NOTE — ASSESSMENT & PLAN NOTE
At home on prednisone 5mg daily  Continued here, but at risk for poor blood sugar control and infection  Monitor for now.

## 2025-07-05 NOTE — ASSESSMENT & PLAN NOTE
POA, shock criteria have since resolved  Source: RLE osteo  1 of 2 blood cultures with MSSA  Hx serratia/pseudomonas/e faecalis wound infection in past  MRI R foot-  Postsurgical changes of partial fourth and fifth ray resections with a soft tissue ulcer overlying the cut surfaces of the fourth and fifth metatarsals and findings concerning for early osteomyelitis in the residual fourth metatarsal. No definite MRI evidence of osteomyelitis. The ulcer is also in close approximation to the distal third metatarsal, and early osteomyelitis in that location cannot be excluded. No evidence of an abscess  S/p R TMA 6/23/2025  Wound culture with MSSA, Pseudomonas and Serratia  ID consultation appreciated  Continue cefepime through to 8/6/25 to complete 6 week course  Outpatient follow-up with ID.    TTE without obvious signs of vegetation  Repeat Bld Cx (6/26) NGTD    Medically stable for discharge.   The patient was accepted at [facility]

## 2025-07-05 NOTE — ASSESSMENT & PLAN NOTE
Likely 2/2 T2DM   Has some stocking dependent sensation impairment  May impact balance  No associated pain  >Close monitoring of skin/incision.

## 2025-07-05 NOTE — NURSING NOTE
[facility]  RN Shift Note        Vitals  Vital Signs  Temperature: 98.3 °F (36.8 °C)  Temp Source: Temporal  Pulse: 71  Respirations: 16  Blood Pressure: 118/60  BP Location: Left arm  BP Method: Automatic  Patient Position - Orthostatic VS: Lying           Pain Pain Score: 0       No pain    GI   (WNL/X)  LBM  Incontinence (yes/no)     Gastrointestinal (WDL): Exceptions to WDL  Last BM Date: 07/05/25             (WNL/X)  Incontinence (yes/no)  Bladder Scan  Urine Output  Shift Total Output  Unmeasured Occurrence   Genitourinary (WDL): Within Defined Limits                   Uses urinal bladder scans ordered    Nutrition  Diet/Precautions   PO Intake  Meal Consumption      P.O.: 120 mL  Percent Meals Eaten (%): 75         LDA  Drain Output              Skin   (WNL/X)  Integrity  Pressure Injury      Integumentary (WDL): Exceptions to WDL  Skin Integrity: Excoriation, Tear, Redness, Bruising  Description     Wound care completed    Behavior/Mood  Behavior log          Sleep Pattern  Sleep log         Education  Medication/Device        ADL/Mobility Summary  (transfer, bed mobility, ambulation)

## 2025-07-05 NOTE — ASSESSMENT & PLAN NOTE
Wt Readings from Last 3 Encounters:   07/05/25 64.9 kg (143 lb 1.3 oz)   06/26/25 62.1 kg (137 lb)   06/10/25 67.1 kg (148 lb)   6/26 Echo with EF 50-55%, Diastolic function WNL  Follows with Dr. Nelson  Home: patient declined diuretic regimen, is on Lisinopril 5mg daily  > Here: Lisinopril 5mg daily  > Monitor volume status, lytes  > I/O, daily weights.  > Management as per IM

## 2025-07-05 NOTE — PLAN OF CARE
Problem: PAIN - ADULT  Goal: Verbalizes/displays adequate comfort level or baseline comfort level  Description: Interventions:  - Encourage patient to monitor pain and request assistance  - Assess pain using appropriate pain scale  - Administer analgesics as ordered based on type and severity of pain and evaluate response  - Implement non-pharmacological measures as appropriate and evaluate response  - Consider cultural and social influences on pain and pain management  - Notify physician/advanced practitioner if interventions unsuccessful or patient reports new pain  - Educate patient/family on pain management process including their role and importance of  reporting pain   - Provide non-pharmacologic/complimentary pain relief interventions  Outcome: Progressing     Problem: INFECTION - ADULT  Goal: Absence or prevention of progression during hospitalization  Description: INTERVENTIONS:  - Assess and monitor for signs and symptoms of infection  - Monitor lab/diagnostic results  - Monitor all insertion sites, i.e. indwelling lines, tubes, and drains  - Monitor endotracheal if appropriate and nasal secretions for changes in amount and color  - [place] appropriate cooling/warming therapies per order  - Administer medications as ordered  - Instruct and encourage patient and family to use good hand hygiene technique  - Identify and instruct in appropriate isolation precautions for identified infection/condition  Outcome: Progressing  Goal: Absence of fever/infection during neutropenic period  Description: INTERVENTIONS:  - Monitor WBC  - Perform strict hand hygiene  - Limit to healthy visitors only  - No plants, dried, fresh or silk flowers with otoole in patient room  Outcome: Progressing     Problem: SKIN/TISSUE INTEGRITY - ADULT  Goal: Skin Integrity remains intact(Skin Breakdown Prevention)  Description: Assess:  -Perform Sae assessment every shift  -Clean and moisturize skin every shift  -Inspect skin when  repositioning, toileting, and assisting with ADLS  -Assess under medical devices   -Assess extremities for adequate circulation and sensation     Bed Management:  -Have minimal linens on bed & keep smooth, unwrinkled  -Change linens as needed when moist or perspiring  -Avoid sitting or lying in one position for more than 2 hours while in bed  -Keep HOB at 30 degrees     Toileting:  -Offer bedside commode  -Assess for incontinence  -Use incontinent care products after each incontinent episode    Activity:  -Mobilize patient 3 times a day  -Encourage activity and walks on unit  -Encourage or provide ROM exercises   -Turn and reposition patient every 2 Hours  -Use appropriate equipment to lift or move patient in bed  -Instruct/ Assist with weight shifting every hour when out of bed in chair  -Consider limitation of chair time 4 hour intervals    Skin Care:  -Avoid use of baby powder, tape, friction and shearing, hot water or constrictive clothing  -Relieve pressure over bony prominences using foam dressings  -Do not massage red bony areas    Outcome: Progressing  Goal: Incision(s), wounds(s) or drain site(s) healing without S/S of infection  Description: INTERVENTIONS  - Assess and document dressing, incision, wound bed, drain sites and surrounding tissue  - Provide patient and family education  - Perform skin care/dressing changes every shift  Outcome: Progressing  Goal: Pressure injury heals and does not worsen  Description: Interventions:  - Implement low air loss mattress or specialty surface (Criteria met)  - Apply silicone foam dressing  - Instruct/assist with weight shifting every hour when in chair   - Limit chair time to 4 hour intervals  - Use special pressure reducing interventions such as waffle cushion when in chair   - Apply fecal or urinary incontinence containment device   - Perform passive or active ROM every shift  - Turn and reposition patient & offload bony prominences every 2 hours   - Utilize  friction reducing device or surface for transfers   Outcome: Progressing

## 2025-07-05 NOTE — ASSESSMENT & PLAN NOTE
Baseline hemoglobin appears to be 8.5-9.5  Received 1 UPRBC 6/18 and 6/23  Hemoglobin now stable at 9.2

## 2025-07-05 NOTE — NURSING NOTE
Patient discharged to ARU. Patient has all medication changes and follow up. Patient given all meds from home and has all belongings. PICC line clamped and capped. Report called.

## 2025-07-05 NOTE — CONSULTS
PHYSICAL MEDICINE AND REHABILITATION [facility] REHAB H&P/CONSULT  Gold Van 79 y.o. 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  S/p TMA on 6/23 by Dr. Galeas  Unclear if source control  OR Cultures: Serratia, MSSA, Pseudomonas  Blood cultures 6/26 with NGTD  > Per ID continue Cefepime through 8/6 for 6 weeks treatment  > PICC placed on 6/30  > Monitor labs at least weekly for toxicities  > Pain management  > Close incisional monitoring and care.  > NWB RLE  > PT/OT 3-5 hours/day, 5-7 days/week  > f/u Podiatry ~ 7/14 for 2 week f/u  > outpatient f/u with ID   Foot drop, left  L ankle weakness in both PF/DF/EHL  Unclear if related to previous CVA in 2024 (CVA in setting of COPD exacerbation, but presented like impaired coordination)  Also with peripheral neuropathy  Denies back/radicular pain.  Was present in last [facility] admission in 2024.   Previously attempted to get AFO, but he declined it due to copay.   > Outpatient f/u with podiatry  CVA (cerebrovascular accident) ([facility])  10/2024  Presented with: loss of coordination in the setting of a COPD exacerbation. Imaging with significant and severe intracranial stenosis and atherosclerotic diseases as well as multiple foci of acute infarcts involving bilateral frontoparietal cortices.   Felt to be 2/2 hypoperfusion with question of hypercoag related to COVID>  Was on DAPT for 90 days, followed by ASA monotherapy  Also found to have Afib.   > Continue Plavix, Statin, Eliquis   Neuropathy  Likely 2/2 T2DM   Has some stocking dependent sensation impairment  May impact balance  No associated pain  >Close monitoring of skin/incision.   Pressure injury of skin of sacral region  POA   >Frequent off loading  Turn patient Q2hr in bed  Specialty cushion when OOB   Local care as  per wound care   Consulted nutrition/wound care for continuity  Outpatient f/u with wound care clinic   Pressure injury of right heel, stage 1  POA to [facility]  > Elevate heels of bed  > Allevyn daily and monitoring Qshift  > Outpatient f/u with Podiatry   Acute pain  Minimal  Mostly in sacrum  > Tylenol PRN  > Oxycodone 2.5-5mg Q4hr PRN  > Monitor and wean as tolerated   Multiple open wounds of lower leg  Dermatitis associated with moisture  POA to [facility]  > Continue local wound care to pretibial wounds  > Nursing to place update photos in chart  > CALEB cream for groin BID  Moderate protein-calorie malnutrition (HCC)  Malnutrition Findings:                                 BMI Findings:           Body mass index is 19.4 kg/m².   > Nutrition consulted   At risk for venous thromboembolism (VTE)  Fully anticoagulated on eliquis, SCDs, Ambulation   Chronic heart failure with preserved ejection fraction (HFpEF) ([facility])  Wt Readings from Last 3 Encounters:   07/05/25 64.9 kg (143 lb 1.3 oz)   06/26/25 62.1 kg (137 lb)   06/10/25 67.1 kg (148 lb)   6/26 Echo with EF 50-55%, Diastolic function WNL  Follows with Dr. Nelson  Home: patient declined diuretic regimen, is on Lisinopril 5mg daily  > Here: Lisinopril 5mg daily  > Monitor volume status, lytes  > I/O, daily weights.  > Management as per IM  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[facility])  Home: Breztri BID, Albuterol PRN, Singulair nightly  Here: Budesonide-Glycopyrrol-Formoterol BID, Singulair QHS, Albuterol PRN   No acute exacerbation    Management as per IM  F/u [facility]  Essential hypertension  Home: Atenolol 25mg daily, Lisinopril 5mg daily  > Here: Atenolol 25mg daily, Lisinopril 5mg daily  Gastroesophageal reflux disease without esophagitis  Home: Famotidine 20mg BID  Here: Famotidine daily  Hyponatremia  Chronically 130-132  Here: 132  Not receiving any treatment currently  Monitor with next labs.   PAF (paroxysmal atrial fibrillation) ([facility])  Home: Eliquis mg BID, no rate/rhythm  control  Here: Same.  Monitor and adjust as appropriate  Severe peripheral arterial disease (HCC)  S/p R CFA/SFA endarterectomy w/ bovine pericardial patch, DCB and stenting of SFA, and angio of AT 5/13/25 ([facility])   > Plavix, Statin, Eliquis  > Monitor neurovascular exam at least daily  > f/u Vascular surgery   Type 2 diabetes mellitus with complication, without long-term current use of insulin ([facility])  Lab Results   Component Value Date    HGBA1C 6.6 (H) 06/18/2025       Recent Labs     07/04/25  1544 07/04/25  2042 07/05/25  0734 07/05/25  1119   POCGLU 302* 194* 132 183*       Blood Sugar Average: Last 72 hrs:  Home: Glipizide 10mg BID, Linagliptin 5mg daily (listed as not taking), Metformin 500mg at dinner   Here: Lantus 12 units QHS, Lispro 4 units TID with meals, CDI/Accuchecks.  Will need to transition to or optimize home meds, or else patient will need to diabetes kit/education.  Management as per IM.     Pruritic condition  At home on prednisone 5mg daily  Continued here, but at risk for poor blood sugar control and infection  Monitor for now.        Health Maintenance  #Delirium/Sleep: At risk. Optimize sleep/wake, pain, bowel, bladder management. Avoid deliriogenic meds and physical restraint. Environmental/behavioral interventions.   #Pain: Tylenol PRN, oxycodone 2.5-5mg Q4hr Prn. Monitor and adjust as appropriate  #Bowel: Last BM 7/5. So far continent. On Senokot-S 2 tabs at bedtime. Ordering Prn suppository and miralax.   #Bladder: So far voiding and continent. Will check PVR x3 to ensure no retention. If all < 150cc can discontinue.   #Skin/Pressure Injury Prevention: Turn Q2hr in bed, with weight shifts D91-36hrs in wheelchair. Float heels in bed.  #DVT Prophylaxis: Fully anticoagulated on eliquis, SCD on the L leg, ambulation.   #GI Prophylaxis: At baseline on famotidine for GERD  #Code Status: Full Code  #FEN: Diabetic Diet, Isaac with B/D, EPHP Vanilla with lunch.   #Dispo: ELOS 10-14 days with  goals to discharge home with family support.    > Will need f/u with Vascular, PCP, Podiatry     Rehab Diagnosis: Impairment of mobility, safety and Activities of Daily Living (ADLs) due to Neurologic Conditions:  03.8  Neuromuscular Disorders  Etiologic Diagnosis: Critical illness myopathy     Rehab Diagnosis: Impairment of mobility, safety and Activities of Daily Living (ADLs) due to Other Disabling Impairments:  13  Other Disabling Impairments  Etiologic Diagnosis: s/p R TMA     History of Present Illness:   Gold Van is a 79 y.o. male with medical history of L ICA aneurysm, previous CVA, HFpEF, HTN, HLD, Severe PAD s/p R CFA/SFA endarterectomy w/ bovine pericardial patch, DCB, and stenting of SFA, and angio of AT on 5/13 with Dr. Aiken, Afib on apixaban, COPD/Asthma, GERD, MPCM, Poorly controlled T2DM, Chronic R foot osteomyelitis who presented to the [facility]  on 6/17 as a transfer from wound clinic for RLE osteomyelitis (s/p R 4th partial amputation) with soft BP, lethargy, nausea, and dizziness. He was fluid resuscitated and started on abx, Also found to have GERDA. He did not respond to IVF and was started on pressors in the ER and transferred to the ICU. HE was able to be taken off pressors on 6/18, he was anemic to 6.2 and given 1 unit pRBC. GERDA resolved.  He was transferred back to med surg on 6/18. Podiatry consulted who planned for TMA. Wound care was consulted for bilateral sacro buttock pressure injuries POA, MASD in groin, scrotum, penis, and thighs, and bilateral pretibial wounds. Has previously grown serratia, pseudomonas, and e faecalis. MRI showed  findings with concern for early OM in residual fourth metatarsal. Dr. Galeas took patient to the OR on  6/23 and performed R TMA. Unfortunately unclear surgical cure. ID recommended long term IV abx for 6 week course thru 8/6 - and optimized regimen to IV cefepime. Operative cultures grew MSSA, pseudomonas, and  serratia.  TTE without signs of vegetation. 6/26 Blood cultures were repeated and negative. He also developd a R heel pressure injury. Cleared for PICC placement on 6/30. Discharge delayed as insurance denied acute inpatient rehab. P2P held up denial. Family appealed and got approved . He was admitted to the [facility] on 7/5/2025.     Subjective:  Overall doing well. Minimal pain. No new CP, SOB, fevers, chills, N/V, abdominal pain. Notes he really hasn't been home since this all began. He denies any other issues - can tell when he needs to go to the bathroom and denies any new difficulties with bowel/bladder. He is eager to get started with therapies.     Review of Systems: A 10-point review of systems was performed. Negative except as listed above.      Drug regimen reviewed, all potential adverse effects identified and addressed:    Scheduled Meds:  Current Facility-Administered Medications   Medication Dose Route Frequency Provider Last Rate    acetaminophen  650 mg Oral Q6H PRN Annie Martines PA-C      albuterol  2 puff Inhalation Q4H PRN BONILLA Salgado-MICHAEL      apixaban  5 mg Oral BID Annie Martines PA-C      [START ON 7/6/2025] atenolol  25 mg Oral Daily Annie Martines PA-C      atorvastatin  40 mg Oral QPM Annie Martines PA-C      Budeson-Glycopyrrol-Formoterol  2 puff Inhalation Q12H [facility] Annie Martines PA-C      cefepime  2,000 mg Intravenous Q8H Annie Martines PA-C      [START ON 7/6/2025] clopidogrel  75 mg Oral Daily BONILLA Salgado-MICHAEL      [START ON 7/6/2025] famotidine  20 mg Oral Daily Annie Martines PA-C      insulin glargine  12 Units Subcutaneous HS BONILLA Salgado-MICHAEL      insulin lispro  1-5 Units Subcutaneous 4x Daily (with meals and at bedtime) BONILLA Salgado-MICHAEL      insulin lispro  4 Units Subcutaneous TID With Meals Annie Martines PA-C      [START ON 7/6/2025] lisinopril  5 mg Oral Daily RANGEL Salgado ANTIFUNGAL   Topical BID  Annie L Dagnall, PA-C      montelukast  10 mg Oral HS Annie L Dagnall, PA-C      [START ON 7/6/2025] multivitamin-minerals  1 tablet Oral Daily Annie L Dagnall, PA-C      ondansetron  4 mg Oral Q6H PRN Annie L Dagnall, PA-C      oxyCODONE  2.5 mg Oral Q4H PRN Annie L Dagnall, PA-C      Or    oxyCODONE  5 mg Oral Q4H PRN Annie L Dagnall, PA-C      polyethylene glycol  17 g Oral Daily PRN Annie L Dagnall, PA-C      [START ON 7/6/2025] predniSONE  5 mg Oral Daily Annie L Dagnall, PA-C      senna-docusate sodium  2 tablet Oral HS Annie L Dagnall, PA-C          Restrictions include:  RLE NWB.  Fall precautions      Functional History/Home Set-up - Prior to Admission:    Lives with his spouse in a 2SH with 13 stairs to the main living floor.  Was independent with ADLs, functional mobility, but received help with IADLs.  Has  RW, SPC, shower chair and grab bars, and raised commode     Functional Status Upon Admission to [facility]:  Mobility: Sup bed mobility, modA x2 hopping with RW for 2-3 hops   Transfers: modA x2 transfers   ADLs: modA LB dressing, maxA toileting      Physical Exam:  Temp:  [97.6 °F (36.4 °C)-98.5 °F (36.9 °C)] 98.3 °F (36.8 °C)  HR:  [63-87] 71  Resp:  [16-20] 16  BP: (101-133)/(60-72) 118/60  SpO2:  [96 %-99 %] 99 %  Physical Exam    Gen: No acute distress, Well-nourished, well-appearing.  HEENT: Moist mucus membranes, Normocephalic/Atraumatic  Cardiovascular: Regular rate, rhythm, S1/S2. Distal pulses palpable  Heme/Extr: No edema  Pulmonary: Non-labored breathing. Lungs CTAB - diminished at bases, particularly on the R.  : No schmitt  GI: Soft, non-tender, non-distended. BS+  MSK/Integ: R TMA with dressing in place C/D/I no strike thru. Elbows intact. Did not visualize sacrum and R heel today.   Neuro: AAOx3, Speech is intact. Appropriate to questioning. Tone is normal. Impaired sensation in a stocking distribution.      MMT:   Strength:   Right  Left  Site  Right  Left  Site    5 5  S  Ab: Shoulder Abductors  5  5  HF: Hip Flexors    5 5  EF: Elbow Flexors  5  5 KF: Knee Flexors    5  5  EE: Elbow Extensors  5  5  KE: Knee Extensors    5  5  WE: Wrist Extensors  NT  2 DR: Dorsi Flexors    5  5  FF: Finger Flexors  NT  4  PF: Plantar Flexors    5  5  HI: Hand Intrinsics  NA 1  EHL: Extensor Hallucis Longus   Psych: Normal mood and affect.     Laboratory:    Results from last 7 days   Lab Units 07/04/25 0441 07/02/25 0438 07/01/25 0439   HEMOGLOBIN g/dL 9.2* 9.4* 9.8*   HEMATOCRIT % 29.2* 30.0* 31.0*   WBC Thousand/uL 5.51 6.07 6.42     Results from last 7 days   Lab Units 07/04/25 0441 07/02/25 0438 07/01/25 0439   BUN mg/dL 39* 30* 29*   SODIUM mmol/L 132* 131* 132*   POTASSIUM mmol/L 4.1 4.2 4.2   CHLORIDE mmol/L 103 101 100   CREATININE mg/dL 0.77 0.77 0.77            Wt Readings from Last 1 Encounters:   07/05/25 64.9 kg (143 lb 1.3 oz)     Estimated body mass index is 19.4 kg/m² as calculated from the following:    Height as of this encounter: 6' (1.829 m).    Weight as of this encounter: 64.9 kg (143 lb 1.3 oz).    Imaging: reviewed   6/17 CXR:  No acute cardiopulmonary disease.     6/17 XR R Foot:  Postop changes of the fourth and fifth metatarsals.     No x-ray evidence of osteomyelitis at this time.    6/18 MRI Foot:  Postsurgical changes of partial fourth and fifth ray resections with a soft tissue ulcer overlying the cut surfaces of the fourth and fifth metatarsals and findings concerning for early osteomyelitis in the residual fourth metatarsal. No definite MRI   evidence of osteomyelitis.     The ulcer is also in close approximation to the distal third metatarsal, and early osteomyelitis in that location cannot be excluded.     No evidence of an abscess.    6/23 Xr r Foot:  Interim transmetatarsal amputation noted of the first, second, and third rays; previous transmetatarsal amputation fourth and fifth rays. No radiopaque foreign body or significant soft tissue gas noted.      Mild degenerative change tibiotalar joint and midfoot.     No lytic or blastic osseous lesion.     Vascular calcification noted.    6/26 Echo:    Left Ventricle: Left ventricular cavity size is normal. Wall thickness is normal. The left ventricular ejection fraction is 50-55%. Systolic function is normal. One view suggests possible relative apico-inferior hypokinesis.  otherwise, wall motion is normal. Diastolic function is normal.    Right Ventricle: Systolic function is normal.    Aortic Valve: There is mild regurgitation. There is aortic valve sclerosis.    Mitral Valve: There is mild regurgitation.    Pericardium: There is no pericardial effusion.    4/18 LEADS:  Impression:  RIGHT LOWER LIMB:  There is a >75% stenosis noted in the proximal superficial femoral artery and a  50-75% stenosis in the distal superficial femoral artery. Occlusion vs high  grade stenosis noted in the distal posterior tibial artery. Diffuse disease with  calcification and heavy shadowing throughout the remaining femoro-popliteal and  tibio-peroneal segments may obscure more significant stenosis.  Ankle/Brachial index: 0.42 which is in the ischemic disease category (Prior  0.83)  PVR/ PPG tracings are dampened.  Metatarsal pressure of  15 mmHg  Great toe pressure of  16 mmHg, below the healing range     LEFT LOWER LIMB:  There is occlusion vs high grade stenosis in the posterior tibial and anterior  tibial arteries. Diffuse disease with calcification and heavy shadowing  throughout the remaining femoro-popliteal and tibio-peroneal segments may  obscure more significant stenosis.  Ankle/Brachial index:   1.06 which is in the normal category (Prior 1.03)  PVR/ PPG tracings are dampened.  Metatarsal pressure of  85 mmHg  Great toe pressure of  45 mmHg, below the healing range for a diabetic patient     Compared to previous study on 7/3/2023 , there is new stenosis and occlusion on  the right. New occlusion of the left. Significant  decrease of right RIC.  Bilateral toe pressure now below healing range.       Rehabilitation Prognosis: fair  Tolerance for three hours of therapy a day: good     Family/Patient Goals:  Patient/family's goals: Return to previous home/apartment.    Patient will receive PT and OT 90 minutes each per day, five days per week to achieve rehab goals or participate in 900 minutes of therapy within a 7 day week period.    Mobility Goals: Contact Guard / Minimal Assistance  Transfer Goals: Contact Guard / Minimal Assistance  Activities of Daily Living (ADLs) Goals: Contact Guard / Minimal Assistance    Discharge Planning:  Rehabilitation and discharge goals discussed with the patient and/or family.    Case Managment and Social Work to review patient/family resources and to coordinate Discharge Planning.    Estimated length of stay: 10 to 14 days    Patient and Family Education and Training:  Rehabilitation and discharge goals discussed with the patient and/or family.  Patient/family education/training needs to be discussed in weekly team meeting.    Equipment/DME needs: Therapy teams to assess and evaluate for additional equipment/DME needs throughout rehabilitation stay    Past Medical History:   Past Surgical History:   Family History:   Social history:   Past Medical History[1] Past Surgical History[2]  Family History[3]   Social History[4]       Current Medical Diagnosis Allergies   Problem List[5] Allergies[6]        Medical Necessity Criteria for [facility] Admission: high risk given T2DM, risk of recurrent infection, severe PAD, HTN, Acute pain, CKD, hx of CVA reportedly without residual deficits, pAFib on blood thinners, risk ofb leed, HLD, hyponatremia, COPD, GERD, pressure injury of R heel and sacral region, MASD, pretibial wounds, moderate malnutritoin, risk of bowel/bladder dysfunction, VTE, further skin breakdown, delirium.. In addition, the preadmission screen, post-admission physical evaluation, overall plan of care  and admissions order demonstrate a reasonable expectation that the following criteria were met at the time of admission to the [facility].  The patient requires active and ongoing therapeutic intervention of multiple therapy disciplines (physical therapy, occupational therapy, speech-language pathology, or prosthetics/orthotics), one of which is physical or occupational therapy.    Patient requires an intensive rehabilitation therapy program, as defined in Chapter 1, section 110.2.2 of the CMS Medicare Policy Manual. This intensive rehabilitation therapy program will consist of at least 3 hours of therapy per day at least 5 days per week or at least 15 hours of intensive rehabilitation therapy within a 7 consecutive day period, beginning with the date of admission to the [facility].    The patient is reasonably expected to actively participate in, and benefit significantly from, the intensive rehabilitation therapy program as defined in Chapter 1, section 110.2.2 of the CMS Medicare Policy Manual at this time of admission to the [facility]. He can reasonably be expected to make measurable improvement (that will be of practical value to improve the patient’s functional capacity or adaptation to impairments) as a result of the rehabilitation treatment, as defined in section 110.3, and such improvement can be expected to be made within the prescribed period of time. As noted in the CMS Medicare Policy Manual, the patient need not be expected to achieve complete independence in the domain of self-care nor be expected to return to his or her prior level of functioning in order to meet this standard.  The patient must require physician supervision by a rehabilitation physician. As such, a rehabilitation physician will conduct face-to-face visits with the patient at least 3 days per week throughout the patient’s stay in the [facility] to assess the patient both medically and functionally, as well as to modify the course of treatment as needed to  maximize the patient’s capacity to benefit from the rehabilitation process.  The patient requires an intensive and coordinated interdisciplinary approach to providing rehabilitation, as defined in Chapter 1, section 110.2.5 of the CMS Medicare Policy Manual. This will be achieved through periodic team conferences, conducted at least once in a 7-day period, and comprising of an interdisciplinary team of medical professionals consisting of: a rehabilitation physician, registered nurse,  and/or , and a licensed/certified therapist from each therapy discipline involved in treating the patient.       ** Please Note: Fluency Direct voice to text software may have been used in the creation of this document. **           [1]   Past Medical History:  Diagnosis Date    Asthma     COPD (chronic obstructive pulmonary disease) ([facility])     COVID-19     CVA (cerebral vascular accident) ([facility])     Diabetes mellitus ([facility])     Environmental allergies     Hyperlipidemia     Hypertension     Macular degeneration 07/13/2020    right eye    Orthostatic hypotension     Osteopenia     Pneumonia     Pneumothorax     Sinusitis    [2]   Past Surgical History:  Procedure Laterality Date    CARPAL TUNNEL RELEASE Left 08/2024    CATARACT EXTRACTION Left 07/2024    COLONOSCOPY      IR PICC PLACEMENT SINGLE LUMEN  6/30/2025    KNEE CARTILAGE SURGERY Right 1964    VA AMPUTATION FOOT TRANSMETARSAL Right 6/23/2025    Procedure: AMPUTATION TRANSMETATARSAL (TMA);  Surgeon: Agustin Galeas DPM;  Location: [facility];  Service: Podiatry    VA AMPUTATION METATARSAL W/TOE SINGLE Right 05/16/2025    Procedure: RAY RESECTION FOOT - R partial 5th ray resection;  Surgeon: Reinaldo Brewer DPM;  Location: [facility];  Service: Podiatry    VA AMPUTATION METATARSAL W/TOE SINGLE Right 05/23/2025    Procedure: Right partial 4th ray amputation, wound debridement;  Surgeon: Brandon Phillips DPM;  Location: [facility];  Service: Podiatry     CA TEAEC W/WO PATCH GRAFT COMMON FEMORAL Right 05/13/2025    Procedure: Right common femoral endarterectomy with bovine pericardial patch Right lower extremity angiogram Third order cannulation of the right anterior tibial artery Balloon angioplasty of the right anterior tibial with a 3x220 Fernando balloon DCB angioplasty of the SFA with a 6x150 [place] balloon Stenting of the right SFA with two 6x150 Grazyna stents, 6x5cm Viabahn, and a 7x5cm viabahn Post-di    VASECTOMY      WISDOM TOOTH EXTRACTION      x4   [3]   Family History  Problem Relation Name Age of Onset    Asthma Mother      Emphysema Mother      Cancer Father      Asthma Brother      Cancer Brother     [4]   Social History  Socioeconomic History    Marital status: /Civil Union   Tobacco Use    Smoking status: Former    Smokeless tobacco: Never    Tobacco comments:     quit about 25 years ago   Vaping Use    Vaping status: Never Used   Substance and Sexual Activity    Alcohol use: Not Currently    Drug use: Never    Sexual activity: Not Currently     Partners: Female   Social History Narrative    Daily caffeine use- 1 cup of tea, 2 cups of coffee     Social Drivers of Health     Food Insecurity: No Food Insecurity (7/5/2025)    Nursing - Inadequate Food Risk Classification     Worried About Running Out of Food in the Last Year: Never true     Ran Out of Food in the Last Year: Never true     Ran Out of Food in the Last Year: Never true   Transportation Needs: No Transportation Needs (7/5/2025)    Nursing - Transportation Risk Classification     Lack of Transportation: No    Received from [facility]   Intimate Partner Violence: Unknown (7/5/2025)    Nursing IPS     Physically Hurt by Someone: No     Hurt or Threatened by Someone: No   Housing Stability: Unknown (7/5/2025)    Nursing: Inadequate Housing Risk Classification     Unable to Pay for Housing in the Last Year: No     Has Housing: No   [5]   Patient Active Problem  List  Diagnosis    Type 2 diabetes mellitus with complication, without long-term current use of insulin (HCC)    Essential hypertension    Mild intermittent asthma without complication    Mixed hyperlipidemia    Acute renal failure superimposed on stage 2 chronic kidney disease  (HCC)    Hyponatremia    Lung nodule    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    Non-recurrent bilateral inguinal hernia without obstruction or gangrene    Pruritic condition    History of pneumothorax    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HCC)    CVA (cerebrovascular accident) ([facility])    Gastroesophageal reflux disease without esophagitis    Neuropathy    Foot drop, left    Moderate protein-calorie malnutrition ([facility])    Severe peripheral arterial disease ([facility])    Ulcer of right foot with fat layer exposed secondary to osteomyelitis    PAF (paroxysmal atrial fibrillation) ([facility])    Chronic heart failure with preserved ejection fraction (HFpEF) ([facility])    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 ([facility])    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 ([facility])    Septic shock ([facility])    Pulmonary hypertension (HCC)    Anemia    Osteomyelitis (HCC)    Pressure injury of skin of sacral region    Metabolic acidosis    Ambulatory dysfunction    MSSA bacteremia    Osteomyelitis of right foot ([facility])    Allergy to antibiotic   [6]   Allergies  Allergen Reactions    Ciprofloxacin Shortness Of Breath    Sulfamethoxazole Shortness Of Breath    Trimethoprim Shortness Of Breath    Dexamethasone Other (See Comments)    Triamcinolone Other (See Comments)    Bactrim [Sulfamethoxazole-Trimethoprim] Fever     Fever of 107

## 2025-07-05 NOTE — ASSESSMENT & PLAN NOTE
POA to ARC  > Elevate heels of bed  > Allevyn daily and monitoring Qshift  > Outpatient f/u with Podiatry

## 2025-07-05 NOTE — ASSESSMENT & PLAN NOTE
----- Message from Zenon Spencer MD sent at 12/1/2020 11:20 AM CST -----  Pathologic findings for recent EGD reviewed.  No evidence of H pylori infection identified.  Some mild inflammation was noted.  Continue current medications.  Follow up as needed with NP   Malnutrition Findings:        Nutrition following  Monitor appetites                       BMI Findings:           Body mass index is 18.58 kg/m².

## 2025-07-05 NOTE — DISCHARGE SUPPORT
Case Management Assessment & Discharge Planning Note    Patient name Gold Van  Location /-01 MRN [id number]  : 1945 Date 2025       Current Admission Date: 2025  Current Admission Diagnosis:Septic shock ([facility])   Patient Active Problem List    Diagnosis Date Noted    MSSA bacteremia 2025    Osteomyelitis of right foot ([facility]) 2025    Allergy to antibiotic 2025    Pressure injury of skin of sacral region 2025    Metabolic acidosis 2025    Ambulatory dysfunction 2025    Osteomyelitis ([facility]) 2025    Anemia 2025    Pulmonary hypertension ([facility]) 2025    Septic shock ([facility]) 2025    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 ([facility]) 2025    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 ([facility]) 2025    PAF (paroxysmal atrial fibrillation) ([facility]) 2025    Chronic heart failure with preserved ejection fraction (HFpEF) ([facility]) 2025    Ulcer of right foot with fat layer exposed secondary to osteomyelitis 2025    Severe peripheral arterial disease ([facility]) 2024    Moderate protein-calorie malnutrition ([facility]) 10/09/2024    Gastroesophageal reflux disease without esophagitis 10/04/2024    Neuropathy 10/04/2024    Foot drop, left 10/04/2024    CVA (cerebrovascular accident) ([facility]) 10/02/2024    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[facility]) 2024    History of pneumothorax 2024    Non-recurrent bilateral inguinal hernia without obstruction or gangrene 2024    Pruritic condition 2024    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 2021    Acute renal failure superimposed on stage 2 chronic kidney disease  ([facility]) 2021    Hyponatremia 2021    Lung nodule 2021    Type 2 diabetes mellitus with complication, without long-term current use of insulin ([facility]) 10/23/2017    Essential hypertension 10/23/2017    Mild intermittent asthma  without complication 10/23/2017    Mixed hyperlipidemia 10/23/2017      LOS (days): 18  Geometric Mean LOS (GMLOS) (days): 4.9  Days to GMLOS:-13   Facility Authorization Approved  [facility] received approved auth for: Acute Rehab  Insurance: [facility]  Determination made after peer to peer was completed?: Yes  Authorization Obtained Via: Phone  Name/Phone # of Rep who called in determination: Lizzette P: [phone]  Facility Name: [facility]  Approved Authorization Number: [id number]  Start of Care Date: 07/05/25  Next Review Date: 07/08/25  Submit Next Review to: [employer] portal or f: [phone]  Other Notes: This is an appeal overturned approval   notified: Eladia EDUARDO     Updates to authorization status will be noted in chart.    Please reach out to CM for updates on any clinical information.

## 2025-07-05 NOTE — ASSESSMENT & PLAN NOTE
2/2 nonhealing wound, presented in septic shock.  S/p TMA on 6/23 by Dr. Galeas  Unclear if source control  OR Cultures: Serratia, MSSA, Pseudomonas  Blood cultures 6/26 with NGTD  > Per ID continue Cefepime through 8/6 for 6 weeks treatment  > PICC placed on 6/30  > Monitor labs at least weekly for toxicities  > Pain management  > Close incisional monitoring and care.  > NWB RLE  > PT/OT 3-5 hours/day, 5-7 days/week  > f/u Podiatry ~ 7/14 for 2 week f/u  > outpatient f/u with ID

## 2025-07-05 NOTE — TREATMENT PLAN
Individualized Plan of Care - [facility] [place]  Gold Van 79 y.o. male MRN: [id number]  Unit/Bed#: -01 Encounter: [id number]     PATIENT INFORMATION  ADMISSION DATE: 7/5/2025  2:30 PM JUAN MIGUEL CATEGORY:Other Disabling Impairments:  13  Other Disabling Impairments   ADMISSION DIAGNOSIS: Septic shock (HCC) [A41.9, R65.21]  EXPECTED LOS: 10 to 14 days     Changing admission diagnosis to s/p TMA     MEDICAL/FUNCTIONAL PROGNOSIS  Based on my assessment of the patient's medical conditions and current functional status, the prognosis for attaining medical and functional goals or the IRF stay is:  Good    Medical Goals: Patient will be able to manage medical conditions and comorbid conditions with medications and follow up upon completion of rehab program.    1. Adequate pain control   - Monitor for development of phantom pain.    - Note that sensation somewhat impaired, so needs close neurovascular monitoring and skin checks   2. Prevention of VTE  3. Adequate secretion management, and monitoring of respiratory status   - Hi risk of atelectasis. Has coarse cough.   4. Bowel/bladder management   - Regularity and continence   5. Maintain appropriate nutrition and hydration for healing and hemodynamic stability  6. Emotional adaptation to impairment  7. Monitor for polypharmacy  8. Fall prevention  9. Patient and family caregiver training/education   - Wound care education   10. Skin protection and prevention of pressure injury   - High risk for non-healing wounds   - Engage nutrition and wound care early   - Goal to heal sacrum and heal R heel (at least prevent progression)  11. Appropriate management of new and chronic comorbidites and sequelae of her acute hospitalization.    - Multiple wounds   - High risk for worsened infection, need for further amputation   - High risk for dehiscence   - Optimize BP and BG management   - Optimize and monitor neurovascular checks in setting of severe PAD and T2DM  neuropathy.   12. Prevention of delirium  13. Arrange appropriate outpatient f/u  14. Incisional care to prevent infection/dehiscence      ANTICIPATED DISCHARGE DISPOSITION AND SERVICES  COMMUNITY SETTING: Home - with supervision and Home - Assistance  Wife available for light assistance and supervision.    ANTICIPATED FOLLOW-UP SERVICE:   Home Health Services: PT, OT, and Nursing  Will need home infusion for IV abx       DISCIPLINE SPECIFIC PLANS:  Required Disciplines & Services: Rehabillitation Nursing    REQUIRED THERAPY:  Therapy Hours per Day Days per Week   Physical Therapy 1-2 5-6   Occupational Therapy 1-2 5-6   NOTE: Additional therapy time(s) or changes to allocation of therapies as appropriate to meet patient needs and to achieve functional goals.    Patient will participate in above therapy regimen consisting of PT and OT due to the following medical procedure/condition:Other Disabling Impairments:  13  Other Disabling Impairments    ANTICIPATED FUNCTIONAL OUTCOMES:  ADL: Sup-Virgie   Bladder/Bowel: Sup-mod Ind   Transfers: Sup-mod Ind   Locomotion: Ind at wheelchair level, Sup-CGA  with short distance hopping    Cognitive: Sup-mod INd     DISCHARGE PLANNING NEEDS  Equipment needs: Discharge needs to be reviewed with team  Will need commode chair for room  Will likely need wheelchair    REHAB ANTICIPATED PARTICIPATION RESTRICTIONS:  NWB RLE

## 2025-07-05 NOTE — ASSESSMENT & PLAN NOTE
S/p R CFA/SFA endarterectomy w/ bovine pericardial patch, DCB and stenting of SFA, and angio of AT 5/13/25 ([facility])   > Plavix, Statin, Eliquis  > Monitor neurovascular exam at least daily  > f/u Vascular surgery

## 2025-07-06 NOTE — PROGRESS NOTES
Progress Note - PMR   Name: Gold Van 79 y.o. 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  S/p TMA on 6/23 by Dr. Galeas  Unclear if source control  OR Cultures: Serratia, MSSA, Pseudomonas  Blood cultures 6/26 with NGTD  > Per ID continue Cefepime through 8/6 for 6 weeks treatment  > PICC placed on 6/30  > Monitor labs at least weekly for toxicities  > Pain management  > Close incisional monitoring and care.  > NWB RLE  > PT/OT 3-5 hours/day, 5-7 days/week  > f/u Podiatry ~ 7/14 for 2 week f/u  > outpatient f/u with ID   Foot drop, left  L ankle weakness in both PF/DF/EHL  Unclear if related to previous CVA in 2024 (CVA in setting of COPD exacerbation, but presented like impaired coordination)  Also with peripheral neuropathy  Denies back/radicular pain.  Was present in last [facility] admission in 2024.   Previously attempted to get AFO, but he declined it due to copay.   > Outpatient f/u with podiatry  CVA (cerebrovascular accident) ([facility])  10/2024  Presented with: loss of coordination in the setting of a COPD exacerbation. Imaging with significant and severe intracranial stenosis and atherosclerotic diseases as well as multiple foci of acute infarcts involving bilateral frontoparietal cortices.   Felt to be 2/2 hypoperfusion with question of hypercoag related to COVID>  Was on DAPT for 90 days, followed by ASA monotherapy  Also found to have Afib.   > Continue Plavix, Statin, Eliquis   Neuropathy  Likely 2/2 T2DM   Has some stocking dependent sensation impairment  May impact balance  No associated pain  >Close monitoring of skin/incision.   Pressure injury of skin of sacral region  POA   >Frequent off loading  Turn patient Q2hr in bed  Specialty cushion  when OOB   Local care as per wound care   Consulted nutrition/wound care for continuity  Outpatient f/u with wound care clinic   Pressure injury of right heel, stage 1  POA to ARC  > Elevate heels of bed  > Allevyn daily and monitoring Qshift  > Outpatient f/u with Podiatry   Acute pain  Minimal  Mostly in sacrum  > Tylenol PRN  > Oxycodone 2.5-5mg Q4hr PRN  > Monitor and wean as tolerated   Multiple open wounds of lower leg  Dermatitis associated with moisture  POA to ARC  > Continue local wound care to pretibial wounds  > Nursing to place update photos in chart  > CALEB cream for groin BID  Moderate protein-calorie malnutrition (HCC)  Malnutrition Findings:                                 BMI Findings:           Body mass index is 19.4 kg/m².   > Nutrition consulted   At risk for venous thromboembolism (VTE)  Fully anticoagulated on eliquis, SCDs, Ambulation   Chronic heart failure with preserved ejection fraction (HFpEF) ([facility])  Wt Readings from Last 3 Encounters:   07/05/25 64.9 kg (143 lb 1.3 oz)   06/26/25 62.1 kg (137 lb)   06/10/25 67.1 kg (148 lb)   6/26 Echo with EF 50-55%, Diastolic function WNL  Follows with Dr. Nelson  Home: patient declined diuretic regimen, is on Lisinopril 5mg daily  > Here: Lisinopril 5mg daily  > Monitor volume status, lytes  > I/O, daily weights.  > Management as per IM  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[facility])  Home: Breztri BID, Albuterol PRN, Singulair nightly  Here: Budesonide-Glycopyrrol-Formoterol BID, Singulair QHS, Albuterol PRN   No acute exacerbation    Management as per IM  F/u [facility]  Essential hypertension  Home: Atenolol 25mg daily, Lisinopril 5mg daily  > Here: Atenolol 25mg daily, Lisinopril 5mg daily  Gastroesophageal reflux disease without esophagitis  Home: Famotidine 20mg BID  Here: Famotidine daily  Hyponatremia  Chronically 130-132  Here: 132  Not receiving any treatment currently  Monitor with next labs.   PAF (paroxysmal atrial fibrillation) ([facility])  Home:  Eliquis mg BID, no rate/rhythm control  Here: Same.  Monitor and adjust as appropriate  Severe peripheral arterial disease (HCC)  S/p R CFA/SFA endarterectomy w/ bovine pericardial patch, DCB and stenting of SFA, and angio of AT 5/13/25 ([facility])   > Plavix, Statin, Eliquis  > Monitor neurovascular exam at least daily  > f/u Vascular surgery   Type 2 diabetes mellitus with complication, without long-term current use of insulin ([facility])  Lab Results   Component Value Date    HGBA1C 6.6 (H) 06/18/2025       Recent Labs     07/05/25  1119 07/05/25  1620 07/05/25  2031 07/06/25  0706   POCGLU 183* 209* 238* 141*       Blood Sugar Average: Last 72 hrs:  (P) 196Home: Glipizide 10mg BID, Linagliptin 5mg daily (listed as not taking), Metformin 500mg at dinner   Here: Lantus 12 units QHS, Lispro 4 units TID with meals, CDI/Accuchecks.  Will need to transition to or optimize home meds, or else patient will need to diabetes kit/education.  Management as per IM.     Pruritic condition  At home on prednisone 5mg daily  Continued here, but at risk for poor blood sugar control and infection  Monitor for now.     DVT ppx: Eliquis    History of Present Illness    79 y.o. male patient who originally presented to the hospital on 6/17/2025 due to lethargy, hypotenstion, and fever at wound care appointment. The patient was found to be in septic shock, metabolic acidosis, and GERDA in the ED. The patient was admitted to critical care service and started on IV abx and IV fluids. He continued to require vasopressors. Podiatry consulted and MRI right foot ordered which showed findings concerning for early osteomyelitis in the residual fourth metatarsal. Given improvement of the patient he was switched to SLIM service on 6/18. The patient taken to the OR on 6/23/25 for a TMA right foot. Wound culture grew Pseudomonas and Serratia, in addition to MSSA. Patient was noted to have 1 of 2 blood cultures positive for MSSA. ID consulted and felt given  evidence of soft tissue infection, growth of MSSA in soft tissue culture and septic shock on presentation, this is most likely true bacteremia. IV abx were de-escalated to IV Cefepime. The patient underwent TTE without obvious vegetation. No need for DENISE per ID. Repeat blood cultures were negative. PICC line was placed on 6/30/25. PT/OT recommended rehab.    Chief Complaint: cough    Interval: Patient seen and examined in bed. No events overnight.  Reports a productive cough that keeps him up at night. Lungs sound clear on exam, started on mucinex. Denies pain. Last BM 7/5.States wife called surgeon yesterday to ask about follow up for tomorrow and was informed once stiches are removed patient can be heel weight bearing. Will reach out to Dr. Galeas tomorrow if he would like to see patient in person.     Review of Systems   Constitutional:  Negative for chills and fever.   Respiratory:  Positive for cough. Negative for shortness of breath.    Cardiovascular:  Negative for chest pain, palpitations and leg swelling.   Gastrointestinal:  Negative for abdominal pain, constipation, diarrhea, nausea and vomiting.       Objective   Functional Update:  Physical Therapy Occupational Therapy Speech Therapy                           Temp:  [98.2 °F (36.8 °C)-98.9 °F (37.2 °C)] 98.2 °F (36.8 °C)  HR:  [71-78] 78  Resp:  [16] 16  BP: (118-131)/(57-63) 131/63  SpO2:  [96 %-99 %] 98 %    Physical Exam  Constitutional:       General: He is not in acute distress.  HENT:      Head: Normocephalic and atraumatic.      Mouth/Throat:      Mouth: Mucous membranes are moist.     Cardiovascular:      Rate and Rhythm: Normal rate and regular rhythm.   Pulmonary:      Effort: Pulmonary effort is normal. No respiratory distress.      Breath sounds: Normal breath sounds. No rhonchi.   Abdominal:      General: Bowel sounds are normal. There is no distension.     Musculoskeletal:         General: No tenderness. Normal range of motion.     Skin:      General: Skin is warm and dry.      Comments: RLE bandaged     Neurological:      Mental Status: He is alert and oriented to person, place, and time. Mental status is at baseline.     Psychiatric:         Mood and Affect: Mood normal.         Behavior: Behavior normal.                 Scheduled Meds:  Current Facility-Administered Medications   Medication Dose Route Frequency Provider Last Rate    acetaminophen  650 mg Oral Q6H PRN Annie L Dagnall, PA-C      albuterol  2 puff Inhalation Q4H PRN Annie L Dagnall, PA-C      apixaban  5 mg Oral BID Annie L Dagnall, PA-C      atenolol  25 mg Oral Daily Annie L Dagnall, PA-C      atorvastatin  40 mg Oral QPM Annie L Dagnall, PA-C      benzonatate  200 mg Oral TID PRN Annie L Dagnall, PA-C      Budeson-Glycopyrrol-Formoterol  2 puff Inhalation Q12H SANDRA Annie L Dagnall, PA-C      cefepime  2,000 mg Intravenous Q8H Annie L Felecial, PA-C 2,000 mg (07/06/25 0514)    clopidogrel  75 mg Oral Daily Annie L Dagnall, PA-C      docusate sodium  100 mg Oral Daily Ashley Depadua, MD      famotidine  20 mg Oral Daily Annie L Dagnall, PA-C      guaiFENesin  600 mg Oral Q12H SANDRA Annie L Dagnall, PA-C      insulin glargine  12 Units Subcutaneous HS Annie L Felecial, PA-C      insulin lispro  1-5 Units Subcutaneous 4x Daily (with meals and at bedtime) Annie L Felecial, PA-C      insulin lispro  4 Units Subcutaneous TID With Meals Annie L Chelseynall, PA-C      lisinopril  5 mg Oral Daily Annie L Dagnall, PA-C      metFORMIN  500 mg Oral Daily With Dinner Annie L Felecial, PA-C      CALEB ANTIFUNGAL   Topical BID Annie L Chelseynall, PA-C      montelukast  10 mg Oral HS Annie L Dagnall, PA-C      multivitamin-minerals  1 tablet Oral Daily Annie L Felecial, PA-C      ondansetron  4 mg Oral Q6H PRN Annie L Dagnall, PA-C      oxyCODONE  2.5 mg Oral Q4H PRN Annie L Dagnall, PA-C      Or    oxyCODONE  5 mg Oral Q4H PRN Annie Martines PA-C      polyethylene  glycol  17 g Oral Daily PRN Annie Martines PA-C      predniSONE  5 mg Oral Daily Annie Martines PA-C      sodium chloride  2 spray Each Nare Q1H PRN Annie Martines PA-C           Lab Results: I have reviewed the following results:  Results from last 7 days   Lab Units 07/04/25  0441 07/02/25 0438 07/01/25  0439   HEMOGLOBIN g/dL 9.2* 9.4* 9.8*   HEMATOCRIT % 29.2* 30.0* 31.0*   WBC Thousand/uL 5.51 6.07 6.42   PLATELETS Thousands/uL 316 335 376     Results from last 7 days   Lab Units 07/04/25  0441 07/02/25  0438 07/01/25  0439   BUN mg/dL 39* 30* 29*   SODIUM mmol/L 132* 131* 132*   POTASSIUM mmol/L 4.1 4.2 4.2   CHLORIDE mmol/L 103 101 100   CREATININE mg/dL 0.77 0.77 0.77              Ilana Payne PA-C  Physical Medicine and Rehabilitation   07/06/25

## 2025-07-06 NOTE — NURSING NOTE
[facility]  RN Shift Note        Vitals  Vital Signs  Temperature: 98.2 °F (36.8 °C)  Temp Source: Temporal  Pulse: 70  Heart Rate Source: Monitor  Respirations: 20  Blood Pressure: 109/58  MAP (mmHg): 82  BP Location: Left arm  BP Method: Automatic  Patient Position - Orthostatic VS: Lying (LEGS ELEVATED LYING SUPINE)  Follow up/Interventions- After PT today pt sitting in room and felt weak and SOB.  VS taken, BP low.  Albuterol inhaler given with relief obtained.  SBT by therapist into bed placed in supine position and legs elevated.  No further complaints.  BP then WML.  PA was notified.         Pain Pain Score: 0  Hospital Pain Intervention(s): Declines    Follow up/Interventions- Continue to monitor   GI   (WNL/X)  LBM  Incontinence (yes/no)     Gastrointestinal (WDL): Within Defined Limits  Last BM Date: 07/05/25  Bowel Incontinence: No    Follow up/Interventions- Continue to monitor      (WNL/X)  Incontinence (yes/no)  Bladder Scan  Urine Output  Shift Total Output  Unmeasured Occurrence   Genitourinary (WDL): Within Defined Limits  Urinary Incontinence: No    Urine: 225 mL    Unmeasured Urine Occurrence: 1 (Tthere was no hat in the bed side commode to measure void)      Follow up/Interventions- PVR 78      scans cancelled   Nutrition  Diet/Precautions   PO Intake  Meal Consumption   Nutrition  Diet Type: Diabetic  P.O.: 120 mL  Percent Meals Eaten (%): 25      Strategies/Interventions-   Follow up- Wife brought in dinner from [employer] and pt ate 100%   LDA  Drain Output           Follow up/Interventions-    Skin   (WNL/X)  Integrity  Pressure Injury YES/NO: no     Integumentary (WDL): Exceptions to WDL  Skin Integrity: Excoriation, Tear, Redness, Bruising  Description     Follow up/Interventions- DDI to right foot   Behavior/Mood  Behavior log YES/NO: no Patient Behaviors/Mood: Brightens with approach, Calm, Cooperative    Follow up/Interventions-    Sleep Pattern  Sleep log no         Education  Medication/Device   Meds, safety, insulin teaching and sliding scale coverage explained to pt     ADL/Mobility Summary  (transfer, bed mobility, ambulation) SBT into bed

## 2025-07-06 NOTE — NURSING NOTE
After therapy pt sitting in chair in room and felt short of breath and c/o generalized weakness.  VSS , BP  low.  See documentation.  Albuterol inhaler given and effectual.  Pt SBT back to bed by therapist.  Placed supine with BLE elevated.  BP improved and pt feeling better.  PA notified.  No new orders at present.  Will continue to monitor.

## 2025-07-06 NOTE — H&P
Justo Vanga#  :1945 BOBBI  MRN:[id number]    CSN:[id number]  Adm Date: 2025  2:30 PM   ATT PHY: Christian Hendrickson Md  [facility]         Chief Complaint: diabetic ulcer of right midfoot s/p right TMA      History of Presenting Illness: Gold Van is a(n) 79 y.o. year old male who is admitted to [facility].  Patient originally presented to [facility] ED on 25 after being sent from wound care clinic for sepsis workup due to increased lethargy, hypotension, tachycardia and fever.  He has history of chronic osteomyelitis of right foot s/p right partial 5th ray resection 25 and s/p right partial 4th ray amputation and debridement of wound 25.  MRI right foot revealed postsurgical changes of partial fourth and fifth ray resections with a soft tissue ulcer overlying the cut surfaces of the fourth and fifth metatarsals and findings concerning for early osteomyelitis in the residual fourth metatarsal, the ulcer is also in close approximation to the distal third metatarsal and early osteomyelitis in that location cannot be excluded, no evidence of an abscess.  Patient started on IV Rocephin and Vancomyin.  Podiatry consulted.  Patient was taken to OR by Dr. Galeas on 25 for right foot transmetatarsal amputation.  Operative cultures were obtained which grew serratia, pseudomonas, MSSA.  Patient's admission blood cultures also showing MSSA.  Infectious disease consulted who recommended long-term antibiotics.  IV Rocephin and Vancomycin discontinued and patient started on IV cefepime through 25.  PICC line placed on 25.  Patient was seen in consultation by PT OT who recommended acute inpatient rehab services.     Patient examined at bedside.  Patient complains of a cough and chest congestion for past few weeks.  Has difficulty getting phlegm up.  Cough drops help.  Denies any chest pain, shortness  of breath.  Last BM today.  Reviewed home medications with patient.     Allergies[1]    Medications Ordered Prior to Encounter[2]    Active Ambulatory Problems     Diagnosis Date Noted    Type 2 diabetes mellitus with complication, without long-term current use of insulin ([facility]) 10/23/2017    Essential hypertension 10/23/2017    Mild intermittent asthma without complication 10/23/2017    Mixed hyperlipidemia 10/23/2017    Hyponatremia 03/24/2021    Lung nodule 03/24/2021    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 06/16/2021    Non-recurrent bilateral inguinal hernia without obstruction or gangrene 03/18/2024    Pruritic condition 03/18/2024    History of pneumothorax 04/28/2024    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[facility]) 04/29/2024    CVA (cerebrovascular accident) ([facility]) 10/02/2024    Gastroesophageal reflux disease without esophagitis 10/04/2024    Neuropathy 10/04/2024    Foot drop, left 10/04/2024    Moderate protein-calorie malnutrition ([facility]) 10/09/2024    Severe peripheral arterial disease ([facility]) 11/26/2024    Ulcer of right foot with fat layer exposed secondary to osteomyelitis 03/25/2025    PAF (paroxysmal atrial fibrillation) ([facility]) 04/23/2025    Chronic heart failure with preserved ejection fraction (HFpEF) ([facility]) 04/23/2025    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 ([facility]) 04/28/2025    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 ([facility]) 04/29/2025    Septic shock ([facility]) 05/07/2025    Pulmonary hypertension ([facility]) 05/14/2025    Anemia 05/17/2025    Osteomyelitis ([facility]) 05/19/2025    Pressure injury of skin of sacral region 06/17/2025    Metabolic acidosis 06/17/2025    Ambulatory dysfunction 06/17/2025    MSSA bacteremia 06/26/2025    Osteomyelitis of right foot ([facility]) 06/26/2025    Allergy to antibiotic 06/26/2025     Resolved Ambulatory Problems     Diagnosis Date Noted    Chest pain 03/22/2019    Sepsis due to pneumonia ([facility]) 03/24/2021    UTI (urinary  tract infection) 03/24/2021    Acute renal failure superimposed on stage 2 chronic kidney disease  (HCC) 03/24/2021    Acute respiratory failure with hypoxia (HCC) 04/28/2024    Shortness of breath 07/23/2024    COVID-19 09/24/2024    Acute respiratory insufficiency 09/24/2024    Dysmetria 10/01/2024    Abnormal echocardiogram 10/03/2024    Impaired mobility and activities of daily living 10/04/2024    Pneumonia of both lower lobes due to infectious organism 10/30/2024    Troponin I above reference range 05/19/2025    Volume overload 05/19/2025     Past Medical History:   Diagnosis Date    Asthma     COPD (chronic obstructive pulmonary disease) (HCC)     CVA (cerebral vascular accident) (HCC)     Diabetes mellitus (HCC)     Environmental allergies     Hyperlipidemia     Hypertension     Macular degeneration 07/13/2020    Orthostatic hypotension     Osteopenia     Pneumonia     Pneumothorax     Sinusitis      Past Surgical History[3]    Social History: Social History[4]    Family History: Family History[5]    Review of Systems   Constitutional:  Negative for chills and fever.   HENT: Negative.     Eyes: Negative.    Respiratory:  Positive for cough. Negative for shortness of breath and wheezing.    Cardiovascular:  Negative for chest pain and palpitations.   Gastrointestinal:  Negative for abdominal pain, constipation, diarrhea, nausea and vomiting.   Genitourinary:  Negative for dysuria and hematuria.   Musculoskeletal:  Positive for arthralgias and gait problem.   Skin:  Positive for wound.   Neurological:  Negative for dizziness and headaches.   All other systems reviewed and are negative.    Physical Exam   Vitals: Blood pressure 127/57, pulse 77, temperature 98.9 °F (37.2 °C), temperature source Temporal, resp. rate 16, height 6' (1.829 m), weight 64.9 kg (143 lb 1.3 oz), SpO2 96%.,Body mass index is 19.4 kg/m².  Constitutional: Awake, alert, in no acute distress.  Head: Normocephalic and atraumatic.    Mouth/Throat: Oropharynx is clear and moist.    Eyes: Conjunctivae and EOM are normal.   Neck: Neck supple.  Cardiovascular: Normal rate, regular rhythm.  Pulmonary/Chest: Effort normal and breath sounds normal.   Abdominal: Soft. Bowel sounds are normal. There is no tenderness.   Neurological: No focal deficits.   Skin: Skin is warm and dry.  No leg edema.  Right foot dressing c/d/i.     Assessment     Gold Van is a(n) 79 y.o. 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 Galeas.     Cardiac with hx of HTN, HLD, PAF, CHF, cardiomyopathy.  Continue atenolol 25 mg daily, lisinopril 5 mg daily, atorvastatin 40 mg daily, Eliquis 5 mg twice daily.  Daily weights.   T2DM.  Hgb A1c 6.6% on 6/18/25.  Home: Glucotrol XL 10 mg twice daily and metformin  mg daily.  Here: Lantus 12u nighty, Humalog 4u TID with meals, metformin  mg daily (restarted 7/6).  SSI with accuchecks ac/hs.  Carb controlled diet.    Hx CVA.  10/2024.  Continue Plavix, Eliquis and statin.  PAD.  S/p R CFA/SFA endarterectomy/stenting on 5/13/25.  Continue Plavix and statin.  GERD.  Patient is on famotidine 20 mg daily.    COPD/asthma.  Continue home Breztri (brought from home), Singulair 10 mg nightly, albuterol inhaler as needed.  CKD stage 2.  Stable.  Cr 0.77 on 7/4.  Peak Cr 1.56 on 6/17.  Avoid nephrotoxins.  Cont to monitor.  Chronic generalized pruritus.  Continue prednisone 5 mg daily.    Chronic hyponatremia.  Level 132 on 7/4.  Asymptomatic.  Cont to trend.    Anemia.  Stable.  Hgb 9.2 on 7/4.  Cont to trend.   Cough.  Ongoing x weeks.  CXR  6/17 negative.  Start Mucinex 600 mg q12h, tessalon perles as needed 7/6.  Cont to monitor.   MSSA bacteremia.  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  Now s/p right TMA.  Patient is on IV cefepime 2g q8h for 6 weeks (thru 8/6/25).  RUE PICC placed 6/30 and to be removed by RN after final dose of antibiotics.  Weekly CBCD and creatinine while on IV  abx.  Pain and bowel regimen.  As per physiatry.  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  OR cultures grew serratia, pseudomonas, MSSA.  Patient is on IV cefepime 2g q8h for 6 weeks (thru 8/6/25).  NWB until sutures are removed per podiatry.  He is receiving intensive PT OT with management as per physiatry.         Prognosis: Fair.    Discharge Plan: In progress.    Advanced Directives: I have discussed in detail with the patient the advanced directives. The patient states his POA is his wife, Mya Van, whose number is [phone].  Patient states he does have a living will with advanced healthcare directives. When discussing cardiac and pulmonary resuscitation efforts with the patient, the patient wishes to be full code.    I have spent more than 50 minutes gathering data, doing physical examination, and discussing the advanced directives, which was witnessed by caring staff.    The patient was discussed with Dr. Hendrickson and he is in agreement with the above note.         [1]   Allergies  Allergen Reactions    Ciprofloxacin Shortness Of Breath    Sulfamethoxazole Shortness Of Breath    Trimethoprim Shortness Of Breath    Dexamethasone Other (See Comments)    Triamcinolone Other (See Comments)    Bactrim [Sulfamethoxazole-Trimethoprim] Fever     Fever of 107   [2]   Current Facility-Administered Medications on File Prior to Encounter   Medication Dose Route Frequency Provider Last Rate Last Admin    [DISCONTINUED] acetaminophen (TYLENOL) tablet 650 mg  650 mg Oral Q6H PRN Benny Zapata DPM   650 mg at 06/28/25 1218    [DISCONTINUED] albuterol (PROVENTIL HFA,VENTOLIN HFA) inhaler 2 puff  2 puff Inhalation Q4H PRN JARAD GoodrichM   2 puff at 06/20/25 0522    [DISCONTINUED] albuterol inhalation solution 2.5 mg  2.5 mg Nebulization Q6H PRN Benny Zapata DPM   2.5 mg at 06/22/25 1313    [DISCONTINUED] apixaban (ELIQUIS) tablet 5 mg  5 mg Oral BID BELGICA Thompson   5 mg at 07/05/25 0859    [DISCONTINUED]  atenolol (TENORMIN) tablet 25 mg  25 mg Oral Daily BELGICA Nelson   25 mg at 07/05/25 0859    [DISCONTINUED] atorvastatin (LIPITOR) tablet 40 mg  40 mg Oral QPM Benny Zapata DPM   40 mg at 07/04/25 1729    [DISCONTINUED] Budeson-Glycopyrrol-Formoterol 160-9-4.8 MCG/ACT AERO 2 puff  2 puff Inhalation Q12H SANDRA Benny Zapata DPM   2 puff at 07/05/25 0901    [DISCONTINUED] ceFEPime (MAXIPIME) 2,000 mg in dextrose 5 % 50 mL IVPB  2,000 mg Intravenous Q8H Soraya BELGICA Carvajal 100 mL/hr at 07/05/25 1037 2,000 mg at 07/05/25 1037    [DISCONTINUED] clopidogrel (PLAVIX) tablet 75 mg  75 mg Oral Daily BELGICA Thompson   75 mg at 07/05/25 0859    [DISCONTINUED] diltiazem (CARDIZEM) injection 15 mg  15 mg Intravenous Once Benny Zapata DPM        [DISCONTINUED] diphenhydrAMINE (BENADRYL) injection 25 mg  25 mg Intravenous Q6H PRN Benny Zapata DPM        [DISCONTINUED] famotidine (PEPCID) tablet 20 mg  20 mg Oral Daily Benny Zapata DPM   20 mg at 07/05/25 0859    [DISCONTINUED] heparin (porcine) injection 1,800 Units  1,800 Units Intravenous Q6H PRN BELGICA Thompson        [DISCONTINUED] heparin (porcine) injection 3,600 Units  3,600 Units Intravenous Q6H PRN BELGICA Thompson   3,600 Units at 06/24/25 0643    [DISCONTINUED] HYDROmorphone HCl (DILAUDID) injection 0.2 mg  0.2 mg Intravenous Q2H PRN Benny Zapata DPM   0.2 mg at 06/23/25 2012    [DISCONTINUED] insulin glargine (LANTUS) subcutaneous injection 12 Units 0.12 mL  12 Units Subcutaneous HS BELGICA Nelson   12 Units at 07/04/25 2136    [DISCONTINUED] insulin lispro (HumALOG/ADMELOG) 100 units/mL subcutaneous injection 1-5 Units  1-5 Units Subcutaneous TID AC BELGICA Thompson   1 Units at 07/05/25 1216    [DISCONTINUED] insulin lispro (HumALOG/ADMELOG) 100 units/mL subcutaneous injection 1-5 Units  1-5 Units Subcutaneous HS BELGICA Thompson   1 Units at 07/04/25 2135    [DISCONTINUED] insulin lispro (HumALOG/ADMELOG) 100 units/mL  subcutaneous injection 4 Units  4 Units Subcutaneous TID With Meals Ximena BELGICA Akbar   4 Units at 07/05/25 1216    [DISCONTINUED] lisinopril (ZESTRIL) tablet 5 mg  5 mg Oral Daily BELGICA Nelson   5 mg at 07/05/25 0859    [DISCONTINUED] moisture barrier miconazole 2% cream (aka CALEB MOISTURE BARRIER ANTIFUNGAL CREAM)   Topical BID Benny Zapata DPM   Given at 07/05/25 0900    [DISCONTINUED] montelukast (SINGULAIR) tablet 10 mg  10 mg Oral HS Benny Zapata DPM   10 mg at 07/04/25 2138    [DISCONTINUED] multivitamin-minerals (CENTRUM) tablet 1 tablet  1 tablet Oral Daily Benny Zapata DPM   1 tablet at 07/05/25 0859    [DISCONTINUED] naloxone (NARCAN) 0.04 mg/mL syringe 0.04 mg  0.04 mg Intravenous Q1MIN PRN Benny Zapata DPM        [DISCONTINUED] ondansetron (ZOFRAN) injection 4 mg  4 mg Intravenous Q6H PRN Benny Zapata DPM        [DISCONTINUED] oxyCODONE (ROXICODONE) IR tablet 5 mg  5 mg Oral Q4H PRN Benny Zapata DPM   5 mg at 06/29/25 0903    [DISCONTINUED] oxyCODONE (ROXICODONE) split tablet 2.5 mg  2.5 mg Oral Q4H PRN Benny Zapata DPM        [DISCONTINUED] polyethylene glycol (MIRALAX) packet 17 g  17 g Oral Daily PRN Benny Zapata DPM        [DISCONTINUED] predniSONE tablet 5 mg  5 mg Oral Daily Benny Zapata DPM   5 mg at 07/05/25 0859    [DISCONTINUED] senna-docusate sodium (SENOKOT S) 8.6-50 mg per tablet 2 tablet  2 tablet Oral HS Benny Zapata DPM   2 tablet at 06/29/25 2110     Current Outpatient Medications on File Prior to Encounter   Medication Sig Dispense Refill    albuterol (ACCUNEB) 1.25 MG/3ML nebulizer solution Take 1.25 mg by nebulization every 6 (six) hours as needed for wheezing      albuterol (PROVENTIL HFA,VENTOLIN HFA) 90 mcg/act inhaler 1 puff if needed      apixaban (ELIQUIS) 5 mg 5 mg in the morning and 5 mg in the evening.      atenolol (TENORMIN) 25 mg tablet Take 25 mg by mouth in the morning.      atorvastatin (LIPITOR) 40 mg tablet Take 1 tablet (40 mg total) by mouth every evening 30 tablet 0     Budeson-Glycopyrrol-Formoterol (Breztri Aerosphere) 160-9-4.8 MCG/ACT AERO Take 2 puffs by mouth in the morning and 2 puffs in the evening.      ceFEPime 2,000 mg in dextrose 5 % 50 mL IVPB Inject 2,000 mg into a catheter in a vein every 8 (eight) hours over 30 minutes at 100 mL/hr      clopidogrel (PLAVIX) 75 mg tablet Take 1 tablet (75 mg total) by mouth daily 90 tablet 0    docusate sodium (COLACE) 100 mg capsule Take 1 capsule (100 mg total) by mouth 2 (two) times a day      famotidine (PEPCID) 20 mg tablet Take 1 tablet (20 mg total) by mouth 2 (two) times a day      glipiZIDE (GLUCOTROL XL) 10 mg 24 hr tablet Take 10 mg by mouth in the morning and 10 mg before bedtime.      lisinopril (ZESTRIL) 5 mg tablet Take 1 tablet (5 mg total) by mouth daily      metFORMIN (GLUCOPHAGE-XR) 500 mg 24 hr tablet Take 500 mg by mouth daily with dinner      montelukast (SINGULAIR) 10 mg tablet Take 1 tablet (10 mg total) by mouth daily at bedtime      Multiple Vitamins-Minerals (Multivitamin Adults) TABS Take 1 tablet by mouth in the morning.      OneTouch Ultra test strip       oxyCODONE (ROXICODONE) 10 MG TABS Take 0.5 tablets (5 mg total) by mouth every 4 (four) hours as needed for moderate pain or severe pain for up to 10 days ** PAPER SCRIPT FOR SNF USE ONLY ** Max Daily Amount: 30 mg 10 tablet 0    polyethylene glycol (MIRALAX) 17 g packet Take 17 g by mouth daily as needed (constipation)      predniSONE 5 mg tablet Take 1 tablet (5 mg total) by mouth in the morning.      [DISCONTINUED] oxyCODONE (ROXICODONE) 10 MG TABS Take 1 tablet (10 mg total) by mouth every 4 (four) hours as needed for severe pain ** PAPER SCRIPT FOR SNF USE ONLY ** Max Daily Amount: 60 mg 10 tablet 0   [3]   Past Surgical History:  Procedure Laterality Date    CARPAL TUNNEL RELEASE Left 08/2024    CATARACT EXTRACTION Left 07/2024    COLONOSCOPY      IR PICC PLACEMENT SINGLE LUMEN  6/30/2025    KNEE CARTILAGE SURGERY Right 1964    CT AMPUTATION  FOOT TRANSMETARSAL Right 6/23/2025    Procedure: AMPUTATION TRANSMETATARSAL (TMA);  Surgeon: Agustin Galeas DPM;  Location: [facility];  Service: Podiatry    NV AMPUTATION METATARSAL W/TOE SINGLE Right 05/16/2025    Procedure: RAY RESECTION FOOT - R partial 5th ray resection;  Surgeon: Reinaldo Brewer DPM;  Location: [facility];  Service: Podiatry    NV AMPUTATION METATARSAL W/TOE SINGLE Right 05/23/2025    Procedure: Right partial 4th ray amputation, wound debridement;  Surgeon: Brandon Phillips DPM;  Location: [facility];  Service: Podiatry    NV TEAEC W/WO PATCH GRAFT COMMON FEMORAL Right 05/13/2025    Procedure: Right common femoral endarterectomy with bovine pericardial patch Right lower extremity angiogram Third order cannulation of the right anterior tibial artery Balloon angioplasty of the right anterior tibial with a 3x220 Fernando balloon DCB angioplasty of the SFA with a 6x150 Kidder balloon Stenting of the right SFA with two 6x150 Grazyna stents, 6x5cm Viabahn, and a 7x5cm viabahn Post-di    VASECTOMY      WISDOM TOOTH EXTRACTION      x4   [4]   Social History  Socioeconomic History    Marital status: /Civil Union   Tobacco Use    Smoking status: Former    Smokeless tobacco: Never    Tobacco comments:     quit about 25 years ago   Vaping Use    Vaping status: Never Used   Substance and Sexual Activity    Alcohol use: Not Currently    Drug use: Never    Sexual activity: Not Currently     Partners: Female   Social History Narrative    Daily caffeine use- 1 cup of tea, 2 cups of coffee     Social Drivers of Health     Food Insecurity: No Food Insecurity (7/5/2025)    Nursing - Inadequate Food Risk Classification     Worried About Running Out of Food in the Last Year: Never true     Ran Out of Food in the Last Year: Never true     Ran Out of Food in the Last Year: Never true   Transportation Needs: No Transportation Needs (7/5/2025)    Nursing - Transportation Risk Classification     Lack of  Transportation: No    Received from [facility]   Intimate Partner Violence: Unknown (7/5/2025)    Nursing IPS     Physically Hurt by Someone: No     Hurt or Threatened by Someone: No   Housing Stability: Unknown (7/5/2025)    Nursing: Inadequate Housing Risk Classification     Unable to Pay for Housing in the Last Year: No     Has Housing: No   [5]   Family History  Problem Relation Name Age of Onset    Asthma Mother      Emphysema Mother      Cancer Father      Asthma Brother      Cancer Brother

## 2025-07-06 NOTE — ASSESSMENT & PLAN NOTE
S/p R CFA/SFA endarterectomy w/ bovine pericardial patch, DCB and stenting of SFA, and angio of AT 5/13/25 ([facility])   > Plavix, Statin, Eliquis  > Monitor neurovascular exam at least daily  > f/u Vascular surgery

## 2025-07-06 NOTE — PROGRESS NOTES
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  Patient has a history significant for chronic right foot osteomyelitis and is s/p amputations of the 4th and 5th toe in May of 2025;  PMH of L ICA aneurysm, previous CVA, HFpEF, HTN, HLD, Severe PAD s/p R CFA/SFA endarterectomy w/ bovine pericardial patch, DCB, and stenting of SFA, and angio of AT on 5/13 with Dr. Aiken, Afib on apixaban, COPD/Asthma, GERD, MPCM, Poorly controlled T2DM   Date of Onset 06/17/25  (Date of surgery: 6/23/25-s/p right TMA)   Support System   Name Stacia   Relationship spouse   Health Status \"she ok\"   Able to provide 24 hour supervision Yes   Able to provide physical help? No   Multiple Support Systems   (Daughter and Son-in-law live close)   Home Setup   Type of Home Multi Level  (pt describes as a [place]; there is a ground floor where there is a garage and on 1 side is a shop and the other side is  a \"family room\" that has Futon, recliner, TV; room for hosp bed, BSC; can use sink in shop on other side of garage)   Method of Entry Stairs;Hand Rail Right   Number of Stairs 13  (6 to landing then 7 to main living area)   Number of Stairs in Home 0   First Floor Bathroom   (BSC on ground floor; in main living area (which is up 13 steps) ther is full bath that has walk-in shower with lip (no seat))   Home Modification Comment patient reports if unable to do 13 steps; can stay in room off garage; room for hosp bed and BSC; also has a recliner; can use sink in shop on opposite side of garage for sponge bath   Available Equipment Roller Walker;Single Point Cane;Bedside " Commode  (may have access to wheelchair; may get hospital bed)   Prior Level of Function   Indoor-Mobility (Ambulation) 3. Independent - Patient completed the activities by him/herself, with or without an assistive device, with no assistance from a helper.   Stairs 3. Independent - Patient completed the activities by him/herself, with or without an assistive device, with no assistance from a helper.   Prior Device Used Z. None of the above  (SPC)   Restrictions/Precautions   Precautions Bed/chair alarms;Fall Risk;Pain;Supervision on toilet/commode   RLE Weight Bearing Per Order NWB   Pain Assessment   Pain Assessment Tool 0-10   Pain Score No Pain   Toileting Hygiene   Type of Assistance Needed Physical assistance;Verbal cues   Physical Assistance Level Total assistance   Comment assist with clothing management and hygiene after BM   Toileting Hygiene CARE Score 1   Toilet Transfer   Surface Assessed Standard Commode   Transfer Technique   (SIT TO STAND at bedrail; swapped out wheelchair and commode)   Limitations Noted In Balance;Endurance;Safety;LE Strength   Adaptive Equipment   (bed rail)   Type of Assistance Needed Physical assistance;Verbal cues   Physical Assistance Level Total assistance   Comment mod A x 1 using bed rail; SBA of another for safety   Toilet Transfer CARE Score 1   Toileting   Able to Pull Clothing down no, up no.   Manage Hygiene Bowel  (dependent)   Limitations Noted In Balance;Safety;LE Strength   Transfer Bed/Chair/Wheelchair   Positioning Concerns Skin Integrity   Limitations Noted In Balance;Endurance;LE Strength;Problem Solving   Adaptive Equipment Transfer Board   Type of Assistance Needed Physical assistance;Verbal cues   Physical Assistance Level 26%-50%   Comment min-mod A SBT bed<>wheelchair; cues for sequence/technique as well as NWB RLE   Chair/Bed-to-Chair Transfer CARE Score 3   Roll Left and Right   Type of Assistance Needed Supervision   Comment bed rail   Roll Left and  Right CARE Score 4   Sit to Lying   Type of Assistance Needed Physical assistance;Verbal cues   Physical Assistance Level 25% or less   Comment MIN A using bed rail   Sit to Lying CARE Score 3   Lying to Sitting on Side of Bed   Type of Assistance Needed Physical assistance;Verbal cues   Physical Assistance Level 25% or less   Comment MIN A using bed rail   Lying to Sitting on Side of Bed CARE Score 3   Sit to Stand   Type of Assistance Needed Physical assistance;Verbal cues   Physical Assistance Level Total assistance   Comment MOD A bed rail; SBA of another for safety; cues for sequence/technique; NWB RLE   Sit to Stand CARE Score 1   Picking Up Object   Reason if not Attempted Safety concerns   Picking Up Object CARE Score 88   Car Transfer   Reason if not Attempted Environmental limitations   Car Transfer CARE Score 10   Ambulation   Findings difficulty maintaining NWB RLE; TBA as able   Walk 10 Feet   Comment difficulty maintaining NWB RLE; TBA as able   Reason if not Attempted Safety concerns   Walk 10 Feet CARE Score 88   Walk 50 Feet with Two Turns   Comment difficulty maintaining NWB RLE; TBA as able   Reason if not Attempted Safety concerns   Walk 50 Feet with Two Turns CARE Score 88   Walk 150 Feet   Comment difficulty maintaining NWB RLE; TBA as able   Reason if not Attempted Safety concerns   Walk 150 Feet CARE Score 88   Walking 10 Feet on Uneven Surfaces   Comment difficulty maintaining NWB RLE; TBA as able   Reason if not Attempted Safety concerns   Walking 10 Feet on Uneven Surfaces CARE Score 88   Wheelchair mobility   Method Right upper extremity;Left upper extremity;Left lower extremity   Assistance Provided For Locking Brakes;Remove Leg Rest;Replace Leg Rest;Remove armrests;Replace armrests   Distance Level Surface (feet) 210 ft   Distance Wheeled 3% Grade 12 ft   Findings min A level and unlevel surfaces; cues for sequence/technique   Type of Wheelchair Used 1. Manual   Wheel 50 Feet with Two  Turns   Type of Assistance Needed Physical assistance;Verbal cues   Physical Assistance Level 25% or less   Comment min A level and unlevel surfaces; cues for sequence/technique   Wheel 50 Feet with Two Turns CARE Score 3   Wheel 150 Feet   Type of Assistance Needed Physical assistance;Verbal cues   Physical Assistance Level 25% or less   Comment min A level and unlevel surfaces; cues for sequence/technique   Wheel 150 Feet CARE Score 3   Curb or Single Stair   Comment patient with difficulty maintaining NWB RLE; TBA as able   Reason if not Attempted Safety concerns   1 Step (Curb) CARE Score 88   4 Steps   Comment patient with difficulty maintaining NWB RLE; TBA as able   Reason if not Attempted Safety concerns   4 Steps CARE Score 88   12 Steps   Comment patient with difficulty maintaining NWB RLE; TBA as able   Reason if not Attempted Safety concerns   12 Steps CARE Score 88   Stairs   Findings patient with difficulty maintaining NWB RLE; TBA as able   Comprehension   QI: Comprehension 3. Usually Understands: Understands most conversations, but misses some part/intent of message. Requires cues at times to understand.   Comprehension (FIM) 5 - Understands basic directions and conversation   Expression   QI: Expression 4. Express complex messages without difficulty and with speech that is clear and easy to understan   Expression (FIM) 6 - Expresses complex/abstract but requires:  more time   Social Interaction   Social Interaction (FIM) 5 - Interacts appropriately with others 90% of time   Problem Solving   Problem solving (FIM) 5 - Solves basic problems 90% of time   Memory   Memory (FIM) 6 - Recognizes with extra time   RLE Assessment   RLE Assessment   (ROM WFL; ankle NT)   Strength RLE   R Hip Flexion 3+/5   R Hip Extension 3+/5   R Hip ABduction 3+/5   R Hip ADduction 3+/5   R Knee Flexion 4/5   R Knee Extension 4/5   LLE Assessment   LLE Assessment   (ROM WFL)   Strength LLE   L Hip Flexion 4/5   L Hip  Extension 4/5   L Hip ABduction 4/5   L Hip ADduction 4/5   L Knee Flexion 4/5   L Knee Extension 4/5   L Ankle Dorsiflexion 3/5   L Ankle Plantar Flexion 3+/5   Coordination   Movements are Fluid and Coordinated 1   Sensation   Light Touch No apparent deficits   Propioception No apparent deficits   Cognition   Overall Cognitive Status WFL   Arousal/Participation Alert;Responsive;Cooperative   Attention Within functional limits   Orientation Level Oriented X4   Memory Decreased recall of precautions   Following Commands Follows one step commands without difficulty   Discharge Information   Vocational Plan Retired/not working   Patient's Discharge Plan return home with wife and family support   Barriers to Discharge Home Limited Family Support;Decreased Strength;Decreased Endurance;Safety Considerations   Impressions Patient seen for IE.  Presents, following hospitalization for diabetic ulcer R foot s/p TMA and PMH of chronic R foot osteomyelitis s/p amp of 4th and 5th toes May 2025, L ICA aneurysm, CVA, HTN, HLD, severe PAD  s/p R CFA/SFA endarterectomy, afib, COPD/asthma, GERD, T2DM(poorly controlled), with decreased ROM/strength, decreased balance and safety, decreased endurance, and NWB RLE; all affecting functional mobility as stated above in respective sections.  Patient will benefit from continued inpatient [facility] PT to increase function, safety, and increased independence in prep for safe d/c to home with family support and continued PT services as needed.   PT Therapy Minutes   PT Time In 1300   PT Time Out 1400   PT Total Time (minutes) 60   PT Mode of treatment - Individual (minutes) 60   PT Mode of treatment - Concurrent (minutes) 0   PT Mode of treatment - Group (minutes) 0   PT Mode of treatment - Co-treat (minutes) 0   PT Mode of Treatment - Total time(minutes) 60 minutes   PT Cumulative Minutes 60   Cumulative Minutes   Cumulative therapy minutes 60

## 2025-07-06 NOTE — NURSING NOTE
[facility]  RN Shift Note        Vitals  Vital Signs  Temperature: 98.9 °F (37.2 °C)  Temp Source: Temporal  Pulse: 77  Respirations: 16  Blood Pressure: 127/57  MAP (mmHg): 82  BP Location: Left arm  BP Method: Automatic  Patient Position - Orthostatic VS: Lying        Follow up/Interventions- con't meds   Pain Pain Score: 0  Hospital Pain Intervention(s): Declines    Follow up/Interventions- monitor   GI   (WNL/X)  LBM  Incontinence (yes/no)     Gastrointestinal (WDL): Within Defined Limits  Last BM Date: 07/05/25       Follow up/Interventions-       (WNL/X)  Incontinence (yes/no)  Bladder Scan  Urine Output  Shift Total Output  Unmeasured Occurrence   Genitourinary (WDL): Within Defined Limits       Urine: 250 mL    Unmeasured Urine Occurrence: 1 (Tthere was no hat in the bed side commode to measure void)      Follow up/Interventions- see flow sheet for contnued bladder scans   Nutrition  Diet/Precautions   PO Intake  Meal Consumption   Nutrition  Diet Type: Diabetic  P.O.: 120 mL  Percent Meals Eaten (%): 75      Strategies/Interventions-   Follow up- no meals for my shift   LDA  Drain Output           Follow up/Interventions- see IV flow sheet   Skin   (WNL/X)  Integrity  Pressure Injury YES/NO: yes     Integumentary (WDL): Exceptions to WDL  Skin Integrity: Excoriation, Tear, Redness, Bruising  Description wound care consult    Follow up/Interventions- as ordered   Behavior/Mood  Behavior log YES/NO: no Patient Behaviors/Mood: Cooperative, Flat affect    Follow up/Interventions-    Sleep Pattern  Sleep log no        Education  Medication/Device     ongoing   ADL/Mobility Summary  (transfer, bed mobility, ambulation)   See pt/ot note

## 2025-07-06 NOTE — ASSESSMENT & PLAN NOTE
Lab Results   Component Value Date    HGBA1C 6.6 (H) 06/18/2025       Recent Labs     07/05/25  1119 07/05/25  1620 07/05/25 2031 07/06/25  0706   POCGLU 183* 209* 238* 141*       Blood Sugar Average: Last 72 hrs:  (P) 196Home: Glipizide 10mg BID, Linagliptin 5mg daily (listed as not taking), Metformin 500mg at dinner   Here: Lantus 12 units QHS, Lispro 4 units TID with meals, CDI/Accuchecks.  Will need to transition to or optimize home meds, or else patient will need to diabetes kit/education.  Management as per IM.

## 2025-07-06 NOTE — PLAN OF CARE
Problem: PAIN - ADULT  Goal: Verbalizes/displays adequate comfort level or baseline comfort level  Description: Interventions:  - Encourage patient to monitor pain and request assistance  - Assess pain using appropriate pain scale  - Administer analgesics as ordered based on type and severity of pain and evaluate response  - Implement non-pharmacological measures as appropriate and evaluate response  - Consider cultural and social influences on pain and pain management  - Notify physician/advanced practitioner if interventions unsuccessful or patient reports new pain  - Educate patient/family on pain management process including their role and importance of  reporting pain   - Provide non-pharmacologic/complimentary pain relief interventions  Outcome: Progressing     Problem: INFECTION - ADULT  Goal: Absence or prevention of progression during hospitalization  Description: INTERVENTIONS:  - Assess and monitor for signs and symptoms of infection  - Monitor lab/diagnostic results  - Monitor all insertion sites, i.e. indwelling lines, tubes, and drains  - Monitor endotracheal if appropriate and nasal secretions for changes in amount and color  - [place] appropriate cooling/warming therapies per order  - Administer medications as ordered  - Instruct and encourage patient and family to use good hand hygiene technique  - Identify and instruct in appropriate isolation precautions for identified infection/condition  Outcome: Progressing  Goal: Absence of fever/infection during neutropenic period  Description: INTERVENTIONS:  - Monitor WBC  - Perform strict hand hygiene  - Limit to healthy visitors only  - No plants, dried, fresh or silk flowers with otoole in patient room  Outcome: Progressing

## 2025-07-07 PROBLEM — E43 SEVERE PROTEIN-CALORIE MALNUTRITION (HCC): Status: ACTIVE | Noted: 2024-10-09

## 2025-07-07 NOTE — NURSING NOTE
2nd EKG done for comparison and given to medical. Patient states that he feels better since laying in bed. Rehab and medical aware

## 2025-07-07 NOTE — PROGRESS NOTES
Progress Note - Gold Van 79 y.o. male MRN: [id number]    Unit/Bed#: [facility] 215-01 Encounter: [id number]        Subjective:   Patient seen and examined at bedside after reviewing the chart and discussing the case with the caring staff.      Patient examined at bedside.  Patient reportedly is not drinking much fluids.  Patient has history of multiple medical problems including diabetes, hypertension, COPD, orthostatic hypotension and appears to be frail.  Patient has an upcoming appointment with cardiology and ID on 7/15/2025.    Patient's labs were reviewed from 7/7/2025.  Patient's CMP was within normal limits except his sodium 132.  Patient's CBC showed hemoglobin 9.8 with hematocrit 31.4.    Physical Exam   Vitals: Blood pressure (!) 75/52, pulse 101, temperature 98.3 °F (36.8 °C), temperature source Temporal, resp. rate 18, height 6' (1.829 m), weight 65.2 kg (143 lb 11.8 oz), SpO2 98%.,Body mass index is 19.49 kg/m².  Constitutional: He appears well-developed.   HEENT: PERR, EOMI, MMM  Cardiovascular: Normal rate and regular rhythm.    Pulmonary/Chest: Effort normal and breath sounds normal.   Abdomen: Soft, + BS, NT    Assessment/Plan:  Gold Van is a(n) 79 y.o. 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 Galeas.      Cardiac with hx of HTN, HLD, PAF, CHF, cardiomyopathy.  Continue atenolol 25 mg daily, lisinopril 5 mg daily, atorvastatin 40 mg daily, Eliquis 5 mg twice daily.  Daily weights.   T2DM.  Hgb A1c 6.6% on 6/18/25.  Home: Glucotrol XL 10 mg twice daily and metformin  mg daily.  Here: Lantus 12u nighty, Humalog 4u TID with meals, metformin  mg daily (restarted 7/6).  SSI with accuchecks ac/hs.  Carb controlled diet.    Hx CVA.  10/2024.  Continue Plavix, Eliquis and statin.  PAD.  S/p R CFA/SFA endarterectomy/stenting on 5/13/25.  Continue Plavix and statin.  GERD.  Patient is on famotidine 20 mg daily.    COPD/asthma.  Continue home Breztri (brought  from home), Singulair 10 mg nightly, albuterol inhaler as needed.  CKD stage 2.  Stable.  Cr 0.77 on 7/4.  Peak Cr 1.56 -> 0.76 on 7/7/2025.  Avoid nephrotoxins.  Cont to monitor.  Chronic generalized pruritus.  Continue prednisone 5 mg daily.    Chronic hyponatremia.  Level 132 -> 132 on 7/7/2025.  Asymptomatic.  Cont to trend.    Anemia.  Stable.  Hgb 9.2 -> 9.8 on 7/7/2025.  Cont to trend.   Cough.  Ongoing x weeks.  CXR  6/17 negative.  Start Mucinex 600 mg q12h, tessalon perles as needed 7/6.  Cont to monitor.   MSSA bacteremia.  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  Now s/p right TMA.  Patient is on IV cefepime 2g q8h for 6 weeks (thru 8/6/25).  RUE PICC placed 6/30 and to be removed by RN after final dose of antibiotics.  Weekly CBCD and creatinine while on IV abx.  Pain and bowel regimen.  As per physiatry.  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  OR cultures grew serratia, pseudomonas, MSSA.  Patient is on IV cefepime 2g q8h for 6 weeks (thru 8/6/25).  NWB until sutures are removed per podiatry.  He is receiving intensive PT OT with management as per physiatry.      Anticipated date of discharge.  TBD.

## 2025-07-07 NOTE — CONSULTS
Pt meets criteria for severe protein/calorie malnutrition. Please see documentation in progress notes dated 7/7/25. Receiving Isaac bid, Ensure Plus HP 1 x day to help increase protein/kcal needs to promote wound healing, tolerating both supplements, not always eating well at meal times. Spouse bringing in foods on occasion which pt enjoys. Will continue to encourage po intake

## 2025-07-07 NOTE — ASSESSMENT & PLAN NOTE
Lab Results   Component Value Date    HGBA1C 6.6 (H) 06/18/2025       Recent Labs     07/06/25  1138 07/06/25  1626 07/06/25 2019 07/07/25  0757   POCGLU 146* 153* 192* 150*       Blood Sugar Average: Last 72 hrs:  (P) [id number]: Glipizide 10mg BID, Linagliptin 5mg daily (listed as not taking), Metformin 500mg at dinner   Here: Lantus 12 units QHS, Lispro 4 units TID with meals, CDI/Accuchecks.  Will need to transition to or optimize home meds, or else patient will need to diabetes kit/education.  Management as per IM.

## 2025-07-07 NOTE — PROGRESS NOTES
"   07/07/25 0700   Pain Assessment   Pain Assessment Tool 0-10   Pain Score No Pain   Restrictions/Precautions   Precautions Bed/chair alarms;Fall Risk;Limb alert;Supervision on toilet/commode   RLE Weight Bearing Per Order NWB   Cognition   Overall Cognitive Status WFL   Arousal/Participation Alert;Responsive;Cooperative   Attention Within functional limits   Orientation Level Oriented X4   Memory Decreased recall of precautions   Following Commands Follows one step commands without difficulty   Subjective   Subjective \"When I have to go to the bathroom, I have to go\"   Roll Left and Right   Type of Assistance Needed Supervision   Physical Assistance Level No physical assistance   Comment /c bed rail   Roll Left and Right CARE Score 4   Sit to Lying   Type of Assistance Needed Physical assistance   Physical Assistance Level 26%-50%   Comment min A /c HOB flat, no rail   Sit to Lying CARE Score 3   Lying to Sitting on Side of Bed   Type of Assistance Needed Physical assistance   Physical Assistance Level 26%-50%   Comment min A /c HOB flat, no rails   Reason if not Attempted Safety concerns   Lying to Sitting on Side of Bed CARE Score -   Sit to Stand   Type of Assistance Needed Physical assistance;Verbal cues   Physical Assistance Level 51%-75%   Comment mod A /c RW. VC for technique and maintaining WBS   Sit to Stand CARE Score 2   Bed-Chair Transfer   Type of Assistance Needed Physical assistance;Verbal cues   Physical Assistance Level 51%-75%   Comment mod A via SBT. VC for technique/maintaining WBS   Chair/Bed-to-Chair Transfer CARE Score 2   Car Transfer   Reason if not Attempted Environmental limitations   Car Transfer CARE Score 10   Walk 10 Feet   Comment difficulty maintaining WBS and hypotension   Reason if not Attempted Safety concerns   Walk 10 Feet CARE Score 88   Walk 50 Feet with Two Turns   Reason if not Attempted Safety concerns   Walk 50 Feet with Two Turns CARE Score 88   Walk 150 Feet   Reason " if not Attempted Activity not applicable   Walk 150 Feet CARE Score 9   Walking 10 Feet on Uneven Surfaces   Comment difficulty maintaining WBS and hypotension   Reason if not Attempted Safety concerns   Walking 10 Feet on Uneven Surfaces CARE Score 88   Ambulation   Findings difficulty maintaining WBS and hypotension   Wheel 50 Feet with Two Turns   Type of Assistance Needed Physical assistance;Supervision   Physical Assistance Level No physical assistance   Comment min A for turns/obstalce negotiation   Wheel 50 Feet with Two Turns CARE Score -   Wheel 150 Feet   Type of Assistance Needed Physical assistance;Supervision   Physical Assistance Level No physical assistance   Comment min A for turns/obstalce negotiation   Wheel 150 Feet CARE Score -   Wheelchair mobility   Method Right upper extremity;Left upper extremity   Assistance Provided For Remove Leg Rest;Replace Leg Rest;Remove armrests;Replace armrests   Distance Level Surface (feet) 150 ft  (150'x2)   Distance Wheeled 3% Grade 10 ft   Findings min A/supervision over level and unlevel surfaces   Does the patient use a wheelchair? 1. Yes   Type of Wheelchair Used 1. Manual   Curb or Single Stair   Comment difficulty maintaining WBS and hypotension   Reason if not Attempted Safety concerns   1 Step (Curb) CARE Score 88   4 Steps   Comment difficulty maintaining WBS and hypotension   Reason if not Attempted Safety concerns   4 Steps CARE Score 88   12 Steps   Comment difficulty maintaining WBS and hypotension   Reason if not Attempted Safety concerns   12 Steps CARE Score 88   Picking Up Object   Reason if not Attempted Safety concerns   Picking Up Object CARE Score 88   Toilet Transfer   Comment pt did not have to use BR during session   Therapeutic Interventions   Strengthening supine/seated BLE TE   Balance txfers   Other bed mob, w/c mobility   Assessment   Treatment Assessment Pt received supine in bed and agreeable to PT session. Maintained NWB R LE t/o  session /c VC while completing functional mobility. Procured high back w/c and recliner for OOB comfort/activity. C/o dizziness t/o session and VS monitored t/o session, noted to drop from 122/61mmHg to 79/49mmHg after completing supine>sit but improved to 101/66mmHg in ~5' with encouragement of fluids and APs. BP after bed>chair txfer: 97/53mmHg and 91/53mmHg post w/c propulsion but when nursing staff came in during session to check VS; BP 75/52mmHg; RN aware of VS t/o session. Completed bed mobility /c min A for trunk/LE management w/o use of bed features. Transfer training completed focusing on sequence and technique for improved safety and balance /c functional mobility via SBT requiring VC for increasing UB WB to reduce sheer on bottom, UB positioning, head/hip relationship, and maintaining WBS. Propelled w/c 150' /c BUEs for UB strengthening and aerobic conditioning to help improve overall functional mobility requiring increased (A) to complete turns/obstacle negotiation. Able to complete STS at RW /c mod A and VC for proper body/ RLE positioning but unable to clear L LE off floor to complete SPT while maintaining WBS. Completed BLE TE as noted below for LE strengthening and muscular endurance for carryover into functional mobility. Pt required increased time and therapeutic rest breaks to complete tasks 2* decreased cardiovascular stamina, muscular fatigue, and c/o dizziness/LH. Pt remains limited 2* decreased strength, endurance, balance, functional mobility, postural stability, righting reactions, coordination, safety awareness, motor planning/problem solving abilities, and orthopedic restrictions. Pt would continue to benefit from skilled [facility] PT services as he is functioning below baseline as well as to maximize independence/safety /c MRADLs, decreasing burden of care and fall risk.   Problem List Decreased strength;Decreased endurance;Impaired balance;Decreased mobility;Pain;Orthopedic  restrictions;Decreased coordination   Barriers to Discharge Inaccessible home environment   PT Barriers   Physical Impairment Decreased strength;Decreased endurance;Impaired balance;Decreased mobility;Decreased coordination;Orthopedic restrictions;Pain   Functional Limitation Car transfers;Ramp negotiation;Stair negotiation;Standing;Transfers;Walking;Wheelchair management   Plan   Treatment/Interventions Functional transfer training;LE strengthening/ROM;Elevations;Therapeutic exercise;Endurance training;Patient/family training;Equipment eval/education;Bed mobility;Gait training;Compensatory technique education;Continued evaluation   Progress Progressing toward goals   PT Therapy Minutes   PT Time In 0700   PT Time Out 0830   PT Total Time (minutes) 90   PT Mode of treatment - Individual (minutes) 60   PT Mode of treatment - Concurrent (minutes) 0   PT Mode of treatment - Group (minutes) 0   PT Mode of treatment - Co-treat (minutes) 0   PT Mode of Treatment - Total time(minutes) 60 minutes   PT Cumulative Minutes 120   Therapy Time missed   Time missed? No     Supine BLE TE:  -SLRs 3x10    SL BLE TE:  -clamshells 3x10    Seated BLE TE:  -glute sets 3x10  -hip aDd 3x10  -heel/toe raises (L LE only) x30

## 2025-07-07 NOTE — NURSING NOTE
Patient rang call bell and states that he did not feel good. He reported being light headed and dizzy. Rehab and medical notified. EKG completed.

## 2025-07-07 NOTE — PLAN OF CARE
Problem: PAIN - ADULT  Goal: Verbalizes/displays adequate comfort level or baseline comfort level  Description: Interventions:  - Encourage patient to monitor pain and request assistance  - Assess pain using appropriate pain scale  - Administer analgesics as ordered based on type and severity of pain and evaluate response  - Implement non-pharmacological measures as appropriate and evaluate response  - Consider cultural and social influences on pain and pain management  - Notify physician/advanced practitioner if interventions unsuccessful or patient reports new pain  - Educate patient/family on pain management process including their role and importance of  reporting pain   - Provide non-pharmacologic/complimentary pain relief interventions  Outcome: Progressing     Problem: INFECTION - ADULT  Goal: Absence of fever/infection during neutropenic period  Description: INTERVENTIONS:  - Monitor WBC  - Perform strict hand hygiene  - Limit to healthy visitors only  - No plants, dried, fresh or silk flowers with otoole in patient room  Outcome: Progressing     Problem: SAFETY ADULT  Goal: Maintain or return to baseline ADL function  Description: INTERVENTIONS:  -  Assess patient's ability to carry out ADLs; assess patient's baseline for ADL function and identify physical deficits which impact ability to perform ADLs (bathing, care of mouth/teeth, toileting, grooming, dressing, etc.)  - Assess/evaluate cause of self-care deficits   - Assess range of motion  - Assess patient's mobility; develop plan if impaired  - Assess patient's need for assistive devices and provide as appropriate  - Encourage maximum independence but intervene and supervise when necessary  - Involve family in performance of ADLs  - Assess for home care needs following discharge   - Consider OT consult to assist with ADL evaluation and planning for discharge  - Provide patient education as appropriate  - Monitor functional capacity and physical  performance, use of AM PAC & JH-HLM   - Monitor gait, balance and fatigue with ambulation    Outcome: Progressing     Problem: DISCHARGE PLANNING  Goal: Discharge to home or other facility with appropriate resources  Description: INTERVENTIONS:  - Identify barriers to discharge w/patient and caregiver  - Arrange for needed discharge resources and transportation as appropriate  - Identify discharge learning needs (meds, wound care, etc.)  - Arrange for interpretive services to assist at discharge as needed  - Refer to Case Management Department for coordinating discharge planning if the patient needs post-hospital services based on physician/advanced practitioner order or complex needs related to functional status, cognitive ability, or social support system  Outcome: Progressing

## 2025-07-07 NOTE — PROGRESS NOTES
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 No   Multiple Support Systems Yes  (Son and daughter live close by)   Home Setup   Type of Home Multi Level  (\"high ranch\")   Method of Entry   (Enter through garage; no step)   Number of Stairs 0   Number of Stairs in Home 13  (Has a living area on the first floor; 13 steps to their main living area)   In Home Hand Rail Right   First Floor Bathroom None   Second Floor Bathroom Shower;Grab Bars  (3/4 inches to step over)   Second Floor Bathroom Accessibility Grab bars in tub/shower;Grab bars by toilet;Raised toilet seat   First Floor Setup Available Yes  (No bathroom available but can sleep there with commode)   Home Modification Comment   (Pt reported if he cannot do the steps he is able to stay in the living area on the first floor; he mentions potentially ordering hospital bed and using commode for first floor)   Available Equipment Roller Walker;Single Point Cane;Bedside Commode   Baseline Information   Vocation   (Retired)   Transportation    Prior Device(s) Used Single Point Cane;Roller Walker   Prior IADL Participation   Money Management Identify Money;Estimate Costs;Estimate " Change;Combine Bills;Manage Checkbook  (Splits with wife)   Meal Preparation Full Participation  (Splits with wife)   Laundry Partial Participation   Home Cleaning Full Participation  (Splits with wife)   Prior Level of Function   Self-Care 3. Independent - Patient completed the activities by him/herself, with or without an assistive device, with no assistance from a helper.   Indoor-Mobility (Ambulation) 3. Independent - Patient completed the activities by him/herself, with or without an assistive device, with no assistance from a helper.   Stairs 3. Independent - Patient completed the activities by him/herself, with or without an assistive device, with no assistance from a helper.   Prior Device Used Z. None of the above  (SPC)   Falls in the Last Year   Number of falls in the past 12 months 0   Patient Preference   Nickname (Patient Preference) Scooter   Patient Normally Wakes at 0800   Patient Normally Goes to Sleep at 2300   Psychosocial   Psychosocial (WDL) WDL   Patient Behaviors/Mood Calm;Cooperative   Restrictions/Precautions   Precautions Bed/chair alarms;Fall Risk;Pain;Supervision on toilet/commode;Limb alert;Multiple lines  (IV RUE)   RLE Weight Bearing Per Order NWB   Pain Assessment   Pain Assessment Tool 0-10   Pain Score 5   Pain Location/Orientation Location: Buttocks   Eating Assessment   Type of Assistance Needed Set-up / clean-up   Eating CARE Score 5   Oral Hygiene   Reason if not Attempted   (Pt reported he preferred his wife help him with oral hygiene when she arrives)   Grooming   Able To Comb/Brush Hair;Wash/Dry Face;Wash/Dry Hands   Limitation Noted In Safety;Strength   Tub/Shower Transfer   Reason Not Assessed Sponge Bath;Medical   Shower/Bathe Self   Type of Assistance Needed Physical assistance   Physical Assistance Level 25% or less   Comment Assist for buttocks thoroughness   Shower/Bathe Self CARE Score 3   Bathing   Assessed Bath Style Sponge Bath   Anticipated D/C Bath Style  Shower;Sponge Bath   Able to Gather/Transport No   Able to Wash/Rinse/Dry (body part) Left Arm;Right Arm;L Upper Leg;R Upper Leg;L Lower Leg/Foot;R Lower Leg/Foot;Chest;Abdomen;Perineal Area;Buttocks  (R foot covered)   Limitations Noted in ROM;Safety;Strength;Balance;Endurance   Positioning Seated   Findings  Able to reach legs/feet when legs reclined in recliner; able to shift weight side to side to wash buttocks   Dressing/Undressing Clothing   Remove UB Clothes Pullover Shirt   Don UB Clothes Pullover Shirt   Type of Assistance Needed Physical assistance   Physical Assistance Level 25% or less   Comment Assist removing shirt over head   Upper Body Dressing CARE Score 3   Remove LB Clothes Shorts;Undergarment;Socks   Don LB Clothes Shorts;Undergarment;Socks   Type of Assistance Needed Physical assistance   Physical Assistance Level 51%-75%   Comment Pt weight shifted side to side while OTS assisted to pull up/down undergarment and shorts   Lower Body Dressing CARE Score 2   Limitations Noted In Endurance;Safety;Strength;ROM;Balance   Positioning Supported Sit   Putting On/Taking Off Footwear   Type of Assistance Needed Physical assistance   Physical Assistance Level Total assistance   Putting On/Taking Off Footwear CARE Score 1   Toileting Hygiene   Reason if not Attempted   (Pt did not need to void during session)   Toilet Transfer   Reason if not Attempted   (Pt did not need to void during session)   Sit to Stand   Type of Assistance Needed Incidental touching   Physical Assistance Level   (CGA)   Comment Pt able to maintain NWB in R foot without VC's   Sit to Stand CARE Score 4   Picking Up Object   Reason if not Attempted Safety concerns   Picking Up Object CARE Score 88   Comprehension   Comprehension (FIM) 6 - Has only MILD difficulty with complex/abstract info   Expression   Expression (FIM) 6 - Expresses complex/abstract but requires:  more time   Social Interaction   Social Interaction (FIM) 6 -  "Interacts appropriately with others BUT requires extra  time   Problem Solving   Problem solving (FIM) 5 - Solves basic problems 90% of time   Memory   Memory (FIM) 6 - Recognizes with extra time   RUE Assessment   RUE Assessment WFL  (MMT: 4+/5)   LUE Assessment   LUE Assessment WFL  (MMT: 4+/5)   Coordination   Movements are Fluid and Coordinated 1   Sensation   Light Touch No apparent deficits   Propioception No apparent deficits   Cognition   Overall Cognitive Status WFL   Arousal/Participation Alert;Responsive;Cooperative   Attention Within functional limits   Orientation Level Oriented X4   Memory Decreased recall of precautions   Following Commands Follows one step commands without difficulty   Therapeutic Exercise   Therapeutic Exercise/Activity B/L hands to tie/untie yellow theratubing; 9lb digiflex 2x15 in bilat UEs; 2x15 flexbar bilat UEs pronation/supination and internal/external rotation; significcant repositioning at end of session to relieve buttock pressure, replaced pillows and encouraged weight shifting   Discharge Information   Vocational Plan Retired/not working   Patient's Discharge Plan Return home with wife/family support   Patient's Rehab Expectations \"the ability to walk again\"   Impressions Pt is a 79 y.o. male seen for OT evaluation s/p admit to [facility] on 7/5/2025 w/ S/P transmetatarsal amputation of foot, right (HCC). Comorbidities affecting pt's functional performance at time of assessment include: L ICA aneurysm, previous CVA, HFpEF, HTN, HLD, Severe PAD s/p R CFA/SFA endarterectomy w/ bovine pericardial patch, DCB, and stenting of SFA, and angio of AT, Afib on apixaban, COPD/Asthma, GERD, MPCM, and poorly controlled T2DM. Personal factors affecting pt at time of IE include:limited home support, difficulty performing ADLS, difficulty performing IADLS , health management , and environment. Prior to admission, pt was independent in all ADLs, fxl mobility/transfers, and " IADLs that he split with his wife. Upon evaluation, pt is overall set-up eating, grooming; Virgie bathing while seated in recliner and UB dressing; mod/maxA LB dressing; total assist donning/doffing footwear. Pt requires assistance 2* the following deficits impacting occupational performance: weakness, decreased ROM, decreased strength, decreased balance, decreased tolerance, decreased safety awareness, increased pain, and orthopedic restrictions. Pt to benefit from continued skilled OT tx while in the hospital to address deficits as defined above and maximize level of functional independence w ADL's, IADLs, and functional mobility. Occupational Performance areas to address include: eating, grooming, bathing/shower, toilet hygiene, dressing, health maintenance, functional mobility, and household maintenance.   OT Therapy Minutes   OT Time In 1030   OT Time Out 1206   OT Total Time (minutes) 96   OT Mode of treatment - Individual (minutes) 82   OT Mode of treatment - Concurrent (minutes) 14

## 2025-07-07 NOTE — ASSESSMENT & PLAN NOTE
Malnutrition Findings:   Adult Malnutrition type: Chronic illness  Adult Degree of Malnutrition: Other severe protein calorie malnutrition  Malnutrition Characteristics: Inadequate energy, Weight loss                  360 Statement: Severe protein/calorie malnutrition r/t inadequate intake as evidenced by 12.8% unplanned wt loss since 4/8/25, Consuming < 75% energy intake compared to estimated energy needs > 1 month, nonhealing wounds. Treated with oral diet + ONS of Isaac bid and Ensure Plus HP 1 x day    BMI Findings:           Body mass index is 19.49 kg/m².   > Nutrition consulted   >encourage PO intake

## 2025-07-07 NOTE — MALNUTRITION/BMI
This medical record reflects one or more clinical indicators suggestive of malnutrition and/or morbid obesity.    Malnutrition Findings:   Adult Malnutrition type: Chronic illness  Adult Degree of Malnutrition: Other severe protein calorie malnutrition  Malnutrition Characteristics: Inadequate energy, Weight loss                360 Statement: Severe protein/calorie malnutrition r/t inadequate intake as evidenced by 12.8% unplanned wt loss since 4/8/25, Consuming < 75% energy intake compared to estimated energy needs > 1 month, nonhealing wounds. Treated with oral diet + ONS of Isaac bid and Ensure Plus HP 1 x day    BMI Findings:           Body mass index is 19.49 kg/m².     See Nutrition note dated 7/7/25 for additional details.  Completed nutrition assessment is viewable in the nutrition documentation.

## 2025-07-07 NOTE — CASE MANAGEMENT
"CM met with patient to welcome back to [facility] and review team process.   Patient was here in October 2024.   Patient lives in 2 story house.   Patient describes house as a high ranch that has a ground floor where there is a garage.  On 1 side of garage is a shop and on the other side is a \"family room\" that has a Futon, recliner, and TV.  Has room for hospital bed if needed and BSC.  There are 13 steps ( 6 + landing + 7) with R HR to main floor where he has his bed room and full bath.  Has shower with lip and grab bars (no seat).  Sleeps in regular bed.   Patient is retired.  Was independent PTA using SPC.  Patient drives.  Has RW, SPC, and BSC at home.  May be able to get wheelchair from [employer].   Patient has no prior experience with HHC.  Has use [address] in [place] as well as [facility] site for outpatient therapy.  Was a patient on [facility] in October 2024.   Patient uses [facility] as well as mail order pharmacy through [facility].  Verified patient's name, address, contact information, PCP and insurance information.  Patient covered from 07/05/2025-07/08/2025 with LCD 07/08/2025.  Next review to be submitted to [employer] portal or faxed to [phone].  IMM form explained and patient signed. Will scan into pt's chart.   Will continue to follow for all D/C planning needs or concerns.    "

## 2025-07-07 NOTE — ASSESSMENT & PLAN NOTE
Wt Readings from Last 3 Encounters:   07/07/25 65.2 kg (143 lb 11.8 oz)   06/26/25 62.1 kg (137 lb)   06/10/25 67.1 kg (148 lb)   6/26 Echo with EF 50-55%, Diastolic function WNL  Follows with Dr. Nelson  Home: patient declined diuretic regimen, is on Lisinopril 5mg daily  > Here: Lisinopril 5mg daily  > Monitor volume status, lytes  > I/O, daily weights.  > Management as per IM

## 2025-07-07 NOTE — ASSESSMENT & PLAN NOTE
S/p R CFA/SFA endarterectomy w/ bovine pericardial patch, DCB and stenting of SFA, and angio of AT 5/13/25 ([facility])   > Plavix, Statin, Eliquis  > Monitor neurovascular exam at least daily  > f/u Vascular surgery

## 2025-07-07 NOTE — NURSING NOTE
[facility]  RN Shift Note        Vitals  Vital Signs  Temperature: 98.7 °F (37.1 °C)  Temp Source: Temporal  Pulse: 83  Heart Rate Source: Monitor  Respirations: 18  Blood Pressure: 137/65  MAP (mmHg): 82  BP Location: Left arm  BP Method: Automatic  Patient Position - Orthostatic VS: Lying        Follow up/Interventions- Low BP patient orthostatic patient put back in bed legs elevated     Pain Pain Score: 5  Hospital Pain Intervention(s): Declines       GI   (WNL/X)  LBM  Incontinence (yes/no)     Gastrointestinal (WDL): Within Defined Limits  Last BM Date: 07/06/25  Bowel Incontinence: No          (WNL/X)  Incontinence (yes/no)  Bladder Scan  Urine Output  Shift Total Output  Unmeasured Occurrence   Genitourinary (WDL): Within Defined Limits  Urinary Incontinence: No    Urine: 375 mL    Unmeasured Urine Occurrence: 1 (Tthere was no hat in the bed side commode to measure void)      Follow up/Interventions- WNL   Nutrition  Diet/Precautions   PO Intake  Meal Consumption   Nutrition  Diet Type: Diabetic  P.O.: 236 mL  Percent Meals Eaten (%): 25         LDA  Drain Output           Follow up/Interventions- PICC RUE. Dsg clean, dry and intact   Skin   (WNL/X)  Integrity  Pressure Injury YES/NO: yes     Integumentary (WDL): Exceptions to WDL  Skin Integrity: Bruising, Excoriation  Description: stage II sacrum. Surgcial incision R foot TMA.    Follow up/Interventions- Triad paste and mepilex applied as ordered to sacrum. Dsg changed to R foot TMA incision, dressed with adaptic and becki.    Behavior/Mood  Behavior log YES/NO: no Patient Behaviors/Mood: Calm, Cooperative    Follow up/Interventions- WNL   Sleep Pattern  Sleep log no     Patient states they slept on overnight    Education  Medication/Device   Patient education on transfers    ADL/Mobility Summary  (transfer, bed mobility, ambulation)   SPT

## 2025-07-07 NOTE — PROGRESS NOTES
Progress Note - PMR   Name: Gold Van 79 y.o. 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  S/p TMA on 6/23 by Dr. Galeas  Unclear if source control  OR Cultures: Serratia, MSSA, Pseudomonas  Blood cultures 6/26 with NGTD  > Per ID continue Cefepime through 8/6 for 6 weeks treatment  > PICC placed on 6/30  > Monitor labs at least weekly for toxicities  > Pain management  > Close incisional monitoring and care.  > NWB RLE  > PT/OT 3-5 hours/day, 5-7 days/week  > f/u Podiatry ~ 7/14 for 2 week f/u   Reached out to Dr Galeas will see tomorrow in clinic  > outpatient f/u with ID   Foot drop, left  L ankle weakness in both PF/DF/EHL  Unclear if related to previous CVA in 2024 (CVA in setting of COPD exacerbation, but presented like impaired coordination)  Also with peripheral neuropathy  Denies back/radicular pain.  Was present in last [facility] admission in 2024.   Previously attempted to get AFO, but he declined it due to copay.   > Outpatient f/u with podiatry  CVA (cerebrovascular accident) ([facility])  10/2024  Presented with: loss of coordination in the setting of a COPD exacerbation. Imaging with significant and severe intracranial stenosis and atherosclerotic diseases as well as multiple foci of acute infarcts involving bilateral frontoparietal cortices.   Felt to be 2/2 hypoperfusion with question of hypercoag related to COVID>  Was on DAPT for 90 days, followed by ASA monotherapy  Also found to have Afib.   > Continue Plavix, Statin, Eliquis   Neuropathy  Likely 2/2 T2DM   Has some stocking dependent sensation impairment  May impact balance  No associated pain  >Close monitoring of skin/incision.   Pressure injury of skin of sacral region  POA   >Frequent  off loading  Turn patient Q2hr in bed  Specialty cushion when OOB   Local care as per wound care   Consulted nutrition/wound care for continuity  Outpatient f/u with wound care clinic   Pressure injury of right heel, stage 1  POA to ARC  > Elevate heels of bed  > Allevyn daily and monitoring Qshift  > Outpatient f/u with Podiatry   Acute pain  Minimal  Mostly in sacrum  > Tylenol PRN  > Oxycodone 2.5-5mg Q4hr PRN  > Monitor and wean as tolerated   Multiple open wounds of lower leg  Dermatitis associated with moisture  POA to ARC  > Continue local wound care to pretibial wounds  > Nursing to place update photos in chart  > CALEB cream for groin BID  Severe protein-calorie malnutrition (HCC)  Malnutrition Findings:   Adult Malnutrition type: Chronic illness  Adult Degree of Malnutrition: Other severe protein calorie malnutrition  Malnutrition Characteristics: Inadequate energy, Weight loss                  360 Statement: Severe protein/calorie malnutrition r/t inadequate intake as evidenced by 12.8% unplanned wt loss since 4/8/25, Consuming < 75% energy intake compared to estimated energy needs > 1 month, nonhealing wounds. Treated with oral diet + ONS of Isaac bid and Ensure Plus HP 1 x day    BMI Findings:           Body mass index is 19.49 kg/m².   > Nutrition consulted   >encourage PO intake  At risk for venous thromboembolism (VTE)  Fully anticoagulated on eliquis, SCDs, Ambulation   Chronic heart failure with preserved ejection fraction (HFpEF) ([facility])  Wt Readings from Last 3 Encounters:   07/07/25 65.2 kg (143 lb 11.8 oz)   06/26/25 62.1 kg (137 lb)   06/10/25 67.1 kg (148 lb)   6/26 Echo with EF 50-55%, Diastolic function WNL  Follows with Dr. Nelson  Home: patient declined diuretic regimen, is on Lisinopril 5mg daily  > Here: Lisinopril 5mg daily  > Monitor volume status, lytes  > I/O, daily weights.  > Management as per IM  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[facility])  Home: Breztri BID, Albuterol PRN, Singulair nightly  Here:  Budesonide-Glycopyrrol-Formoterol BID, Singulair QHS, Albuterol PRN   No acute exacerbation    Management as per IM  F/u [facility]  Essential hypertension  Home: Atenolol 25mg daily, Lisinopril 5mg daily  > Here: Atenolol 25mg daily, Lisinopril 5mg daily  Gastroesophageal reflux disease without esophagitis  Home: Famotidine 20mg BID  Here: Famotidine daily  Hyponatremia  Chronically 130-132  Here: 132  Not receiving any treatment currently  Monitor with next labs.   PAF (paroxysmal atrial fibrillation) ([facility])  Home: Eliquis mg BID, no rate/rhythm control  Here: Same.  Monitor and adjust as appropriate  Severe peripheral arterial disease ([facility])  S/p R CFA/SFA endarterectomy w/ bovine pericardial patch, DCB and stenting of SFA, and angio of AT 5/13/25 ([facility])   > Plavix, Statin, Eliquis  > Monitor neurovascular exam at least daily  > f/u Vascular surgery   Type 2 diabetes mellitus with complication, without long-term current use of insulin ([facility])  Lab Results   Component Value Date    HGBA1C 6.6 (H) 06/18/2025       Recent Labs     07/06/25  1138 07/06/25  1626 07/06/25  2019 07/07/25  0757   POCGLU 146* 153* 192* 150*       Blood Sugar Average: Last 72 hrs:  (P) [id number]: Glipizide 10mg BID, Linagliptin 5mg daily (listed as not taking), Metformin 500mg at dinner   Here: Lantus 12 units QHS, Lispro 4 units TID with meals, CDI/Accuchecks.  Will need to transition to or optimize home meds, or else patient will need to diabetes kit/education.  Management as per IM.     Pruritic condition  At home on prednisone 5mg daily  Continued here, but at risk for poor blood sugar control and infection  Monitor for now.     DVT ppx: Eliquis    History of Present Illness   79 y.o. male patient who originally presented to the hospital on 6/17/2025 due to lethargy, hypotenstion, and fever at wound care appointment. The patient was found to be in septic shock, metabolic acidosis, and GERDA in the ED. The patient  was admitted to critical care service and started on IV abx and IV fluids. He continued to require vasopressors. Podiatry consulted and MRI right foot ordered which showed findings concerning for early osteomyelitis in the residual fourth metatarsal. Given improvement of the patient he was switched to SLIM service on 6/18. The patient taken to the OR on 6/23/25 for a TMA right foot. Wound culture grew Pseudomonas and Serratia, in addition to MSSA. Patient was noted to have 1 of 2 blood cultures positive for MSSA. ID consulted and felt given evidence of soft tissue infection, growth of MSSA in soft tissue culture and septic shock on presentation, this is most likely true bacteremia. IV abx were de-escalated to IV Cefepime. The patient underwent TTE without obvious vegetation. No need for DENISE per ID. Repeat blood cultures were negative. PICC line was placed on 6/30/25. PT/OT recommended rehab.     Chief Complaint: cough    Interval: Patient seen and examined in recliner this morning. No events overnight.  Reports that he still has a cough but it has improved. Endorses pain in his sacrum. Last BM 7/6. Sleeping well. Episode of orthostatic hypotension. TEDS ordered.    Review of Systems   Constitutional:  Negative for chills and fever.   Respiratory:  Positive for cough. Negative for shortness of breath.    Cardiovascular:  Negative for chest pain, palpitations and leg swelling.   Gastrointestinal:  Negative for abdominal pain, constipation, diarrhea, nausea and vomiting.       Objective   Functional Update:  Physical Therapy Occupational Therapy Speech Therapy                           Temp:  [97 °F (36.1 °C)-98.3 °F (36.8 °C)] 98.3 °F (36.8 °C)  HR:  [] 101  Resp:  [18-22] 18  BP: ()/(47-66) 75/52  SpO2:  [95 %-100 %] 98 %    Physical Exam  Constitutional:       General: He is not in acute distress.  HENT:      Head: Normocephalic and atraumatic.      Mouth/Throat:      Mouth: Mucous membranes are moist.      Cardiovascular:      Rate and Rhythm: Normal rate and regular rhythm.   Pulmonary:      Effort: Pulmonary effort is normal. No respiratory distress.      Breath sounds: Normal breath sounds. No rhonchi.   Abdominal:      General: Bowel sounds are normal. There is no distension.     Musculoskeletal:         General: No tenderness. Normal range of motion.     Skin:     General: Skin is warm and dry.      Comments: RLE bandaged     Neurological:      Mental Status: He is alert and oriented to person, place, and time. Mental status is at baseline.     Psychiatric:         Mood and Affect: Mood normal.         Behavior: Behavior normal.           Scheduled Meds:  Current Facility-Administered Medications   Medication Dose Route Frequency Provider Last Rate    acetaminophen  650 mg Oral Q6H PRN Annie Martines, PA-C      albuterol  2 puff Inhalation Q4H PRN Annie Martines, PA-C      apixaban  5 mg Oral BID Annie Martines, PA-C      atenolol  25 mg Oral Daily Annie Martines, PA-C      atorvastatin  40 mg Oral QPM Annie Martines, PA-C      benzonatate  200 mg Oral TID PRN Annie Martines, PA-C      Budeson-Glycopyrrol-Formoterol  2 puff Inhalation Q12H [facility] Ilana Payne PA-C      cefepime  2,000 mg Intravenous Q8H Annie Martines PA-C 2,000 mg (07/07/25 0514)    clopidogrel  75 mg Oral Daily Annie Martines, PA-C      docusate sodium  100 mg Oral Daily Ashley Depadua, MD      famotidine  20 mg Oral Daily Annie Martines, PA-MICHAEL      guaiFENesin  600 mg Oral Q12H [facility] Annie Martines PA-C      insulin glargine  12 Units Subcutaneous HS Annie Martines, PA-C      insulin lispro  1-5 Units Subcutaneous 4x Daily (with meals and at bedtime) Annie Martines, PA-C      insulin lispro  4 Units Subcutaneous TID With Meals Annie Martines, PA-MICHAEL      lisinopril  5 mg Oral Daily Annie Martines, PA-C      metFORMIN  500 mg Oral Daily With Dinner BONILLA Salgado-MICHAEL      CALEB ANTIFUNGAL    Topical BID Annie L Dagnall, PA-C      montelukast  10 mg Oral HS Annie L Dagnall, PA-C      multivitamin-minerals  1 tablet Oral Daily Annie L Dagnall, PA-C      ondansetron  4 mg Oral Q6H PRN Annie L Dagnall, PA-C      oxyCODONE  2.5 mg Oral Q4H PRN Annie L Dagnall, PA-C      Or    oxyCODONE  5 mg Oral Q4H PRN Annie L Dagnall, PA-C      polyethylene glycol  17 g Oral Daily PRN Annie L Dagnall, PA-C      predniSONE  5 mg Oral Daily Annie L Dagnall, PA-C      sodium chloride  2 spray Each Nare Q1H PRN Annie L Dagnall, PA-C           Lab Results: I have reviewed the following results:  Results from last 7 days   Lab Units 07/07/25  0526 07/04/25 0441 07/02/25  0438   HEMOGLOBIN g/dL 9.8* 9.2* 9.4*   HEMATOCRIT % 31.4* 29.2* 30.0*   WBC Thousand/uL 6.75 5.51 6.07   PLATELETS Thousands/uL 281 316 335     Results from last 7 days   Lab Units 07/07/25  0526 07/04/25  0441 07/02/25  0438   BUN mg/dL 30* 39* 30*   SODIUM mmol/L 132* 132* 131*   POTASSIUM mmol/L 4.1 4.1 4.2   CHLORIDE mmol/L 102 103 101   CREATININE mg/dL 0.76 0.77 0.77   AST U/L 17  --   --    ALT U/L 19  --   --               Ilana Payne PA-C  Physical Medicine and Rehabilitation   07/07/25

## 2025-07-07 NOTE — NURSING NOTE
[facility]  RN Shift Note        Vitals  Vital Signs  Temperature: (!) 97 °F (36.1 °C)  Temp Source: Tympanic  Pulse: 71  Heart Rate Source: Monitor  Respirations: 18  Blood Pressure: 120/56  MAP (mmHg): 82  BP Location: Left arm  BP Method: Automatic  Patient Position - Orthostatic VS: Lying        Follow up/Interventions- WNL   Pain Pain Score: 0  Hospital Pain Intervention(s): Declines       GI   (WNL/X)  LBM  Incontinence (yes/no)     Gastrointestinal (WDL): Within Defined Limits  Last BM Date: 07/06/25  Bowel Incontinence: No          (WNL/X)  Incontinence (yes/no)  Bladder Scan  Urine Output  Shift Total Output  Unmeasured Occurrence   Genitourinary (WDL): Within Defined Limits  Urinary Incontinence: No    Urine: 375 mL    Unmeasured Urine Occurrence: 1 (Tthere was no hat in the bed side commode to measure void)      Follow up/Interventions- WNL   Nutrition  Diet/Precautions   PO Intake  Meal Consumption   Nutrition  Diet Type: Diabetic  P.O.: 120 mL  Percent Meals Eaten (%): 100 (food brought by wife)         LDA  Drain Output           Follow up/Interventions- PICC RUE. Dsg clean, dry and intact   Skin   (WNL/X)  Integrity  Pressure Injury YES/NO: yes     Integumentary (WDL): Exceptions to WDL  Skin Integrity: Bruising, Excoriation  Description: stage II sacrum. Surgcial incision R foot TMA.    Follow up/Interventions- Triad paste and mepilex applied as ordered to sacrum. Dsg changed to R foot TMA incision, dressed with adaptic and becki.    Behavior/Mood  Behavior log YES/NO: no Patient Behaviors/Mood: Brightens with approach, Calm, Cooperative    Follow up/Interventions- WNL   Sleep Pattern  Sleep log no        Education  Medication/Device     Insulin teaching attempted, pt stated that he was not on insulin prior to admission and would like to continue on oral meds.    ADL/Mobility Summary  (transfer, bed mobility, ambulation)   Slideboard or SPT

## 2025-07-07 NOTE — ASSESSMENT & PLAN NOTE
2/2 nonhealing wound, presented in septic shock.  S/p TMA on 6/23 by Dr. Galeas  Unclear if source control  OR Cultures: Serratia, MSSA, Pseudomonas  Blood cultures 6/26 with NGTD  > Per ID continue Cefepime through 8/6 for 6 weeks treatment  > PICC placed on 6/30  > Monitor labs at least weekly for toxicities  > Pain management  > Close incisional monitoring and care.  > NWB RLE  > PT/OT 3-5 hours/day, 5-7 days/week  > f/u Podiatry ~ 7/14 for 2 week f/u   Reached out to Dr Galeas will see tomorrow in clinic  > outpatient f/u with ID

## 2025-07-07 NOTE — TELEPHONE ENCOUNTER
Please see below message. Patient is now at the [facility] (same building as wound care) and patients wife says Scooter was discharged with no orders and they  would like to speak to someone clinical ASAP. She is waiting for a return call. Thank you

## 2025-07-08 ENCOUNTER — OFFICE VISIT (OUTPATIENT)
Facility: HOSPITAL | Age: 80
End: 2025-07-08
Payer: COMMERCIAL

## 2025-07-08 VITALS
HEART RATE: 104 BPM | DIASTOLIC BLOOD PRESSURE: 64 MMHG | RESPIRATION RATE: 20 BRPM | SYSTOLIC BLOOD PRESSURE: 117 MMHG | TEMPERATURE: 97.7 F

## 2025-07-08 DIAGNOSIS — E11.8 TYPE 2 DIABETES MELLITUS WITH COMPLICATION, WITHOUT LONG-TERM CURRENT USE OF INSULIN (HCC): Chronic | ICD-10-CM

## 2025-07-08 DIAGNOSIS — S81.812A NONINFECTED SKIN TEAR OF LEFT LOWER EXTREMITY, INITIAL ENCOUNTER: ICD-10-CM

## 2025-07-08 DIAGNOSIS — T81.31XA DEHISCENCE OF OPERATIVE WOUND, INITIAL ENCOUNTER: Primary | ICD-10-CM

## 2025-07-08 PROCEDURE — 99213 OFFICE O/P EST LOW 20 MIN: CPT | Performed by: PODIATRIST

## 2025-07-08 PROCEDURE — 11042 DBRDMT SUBQ TIS 1ST 20SQCM/<: CPT | Performed by: PODIATRIST

## 2025-07-08 PROCEDURE — 99215 OFFICE O/P EST HI 40 MIN: CPT | Performed by: PODIATRIST

## 2025-07-08 NOTE — PLAN OF CARE
Problem: Prexisting or High Potential for Compromised Skin Integrity  Goal: Skin integrity is maintained or improved  Description: INTERVENTIONS:  - Identify patients at risk for skin breakdown  - Assess and monitor skin integrity including under and around medical devices   - Assess and monitor nutrition and hydration status  - Monitor labs  - Assess for incontinence   - Turn and reposition patient  - Assist with mobility/ambulation  - Relieve pressure over joycelyn prominences   - Avoid friction and shearing  - Provide appropriate hygiene as needed including keeping skin clean and dry  - Evaluate need for skin moisturizer/barrier cream  - Collaborate with interdisciplinary team  - Patient/family teaching  - Consider wound care consult    Assess:  - Review Sae scale daily  - Clean and moisturize skin every day  - Inspect skin when repositioning, toileting, and assisting with ADLS  Assess extremities for adequate circulation and sensation     Bed Management:  - Have minimal linens on bed & keep smooth, unwrinkled  - Change linens as needed when moist or perspiring  - Avoid sitting or lying in one position for more than 2 hours while in bed?Keep    - Toileting:  - Offer bedside commode  Activity:  - Mobilize patient several  times a day  - Encourage activity and walks on unit  - Encourage or provide ROM exercises   - Turn and reposition patient every 2 Hours  - Use appropriate equipment to lift or move patient in bed  - Instruct/ Assist with weight shifting every hour when out of bed in chair    Skin Care:  - Avoid use of baby powder, tape, friction and shearing, hot water or constrictive clothing  - Relieve pressure over bony prominences using Allevyn   - Do not massage red bony areas    Outcome: Progressing

## 2025-07-08 NOTE — PROGRESS NOTES
Progress Note - PMR   Name: Gold Van 79 y.o. 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  S/p TMA on 6/23 by Dr. Galeas  Unclear if source control  OR Cultures: Serratia, MSSA, Pseudomonas  Blood cultures 6/26 with NGTD  > Per ID continue Cefepime through 8/6 for 6 weeks treatment  > PICC placed on 6/30  > Monitor labs at least weekly for toxicities  > Pain management  > Close incisional monitoring and care.  > NWB RLE  > PT/OT 3-5 hours/day, 5-7 days/week  > f/u Podiatry ~ 7/14 for 2 week f/u   Patient f/u w/ Dr. Galeas in office today for dressing change and possible clearance to weightbear through heel.   > outpatient f/u with ID   - 7/22 Home PT/OT/RN  Foot drop, left  L ankle weakness in both PF/DF/EHL  Unclear if related to previous CVA in 2024 (CVA in setting of COPD exacerbation, but presented like impaired coordination)  Also with peripheral neuropathy  Denies back/radicular pain.  Was present in last [facility] admission in 2024.   Previously attempted to get AFO, but he declined it due to copay.   > Outpatient f/u with podiatry  CVA (cerebrovascular accident) ([facility])  10/2024  Presented with: loss of coordination in the setting of a COPD exacerbation. Imaging with significant and severe intracranial stenosis and atherosclerotic diseases as well as multiple foci of acute infarcts involving bilateral frontoparietal cortices.   Felt to be 2/2 hypoperfusion with question of hypercoag related to COVID>  Was on DAPT for 90 days, followed by ASA monotherapy  Also found to have Afib.   > Continue Plavix, Statin, Eliquis   Neuropathy  Likely 2/2 T2DM   Has some stocking dependent sensation impairment  May impact balance  No associated pain  >Close  Right forearm  Improved     monitoring of skin/incision.   Pressure injury of skin of sacral region  POA   >Frequent off loading  Turn patient Q2hr in bed  Specialty cushion when OOB   Local care as per wound care   Consulted nutrition/wound care for continuity  Outpatient f/u with wound care clinic   Pressure injury of right heel, stage 1  POA to ARC  > Elevate heels of bed  > Allevyn daily and monitoring Qshift  > Outpatient f/u with Podiatry   Acute pain  Minimal  Mostly in sacrum  > Tylenol PRN  > Oxycodone 2.5-5mg Q4hr PRN  > Monitor and wean as tolerated   Multiple open wounds of lower leg  Dermatitis associated with moisture  POA to ARC  > Continue local wound care to pretibial wounds  > Nursing to place update photos in chart  > CALEB cream for groin BID  Severe protein-calorie malnutrition (HCC)  Malnutrition Findings:   Adult Malnutrition type: Chronic illness  Adult Degree of Malnutrition: Other severe protein calorie malnutrition  Malnutrition Characteristics: Inadequate energy, Weight loss                  360 Statement: Severe protein/calorie malnutrition r/t inadequate intake as evidenced by 12.8% unplanned wt loss since 4/8/25, Consuming < 75% energy intake compared to estimated energy needs > 1 month, nonhealing wounds. Treated with oral diet + ONS of Isaac bid and Ensure Plus HP 1 x day    BMI Findings:           Body mass index is 21.41 kg/m².   > Nutrition consulted   >encourage PO intake  At risk for venous thromboembolism (VTE)  Fully anticoagulated on eliquis, SCDs, Ambulation   Chronic heart failure with preserved ejection fraction (HFpEF) ([facility])  Wt Readings from Last 3 Encounters:   07/08/25 71.6 kg (157 lb 13.6 oz)   06/26/25 62.1 kg (137 lb)   06/10/25 67.1 kg (148 lb)   6/26 Echo with EF 50-55%, Diastolic function WNL  Follows with Dr. Nelson  Home: patient declined diuretic regimen, is on Lisinopril 5mg daily  > Here: Lisinopril 5mg daily  > Monitor volume status, lytes  > I/O, daily weights.  > Management as per  IM  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[facility])  Home: Breztri BID, Albuterol PRN, Singulair nightly  Here: Budesonide-Glycopyrrol-Formoterol BID, Singulair QHS, Albuterol PRN   No acute exacerbation    Management as per IM  F/u [facility]  Essential hypertension  Home: Atenolol 25mg daily, Lisinopril 5mg daily  > Here: Atenolol 25mg daily, Lisinopril 5mg daily  Gastroesophageal reflux disease without esophagitis  Home: Famotidine 20mg BID  Here: Famotidine daily  Hyponatremia  Chronically 130-132  Here: 132  Not receiving any treatment currently  Monitor with next labs.   PAF (paroxysmal atrial fibrillation) ([facility])  Home: Eliquis mg BID, no rate/rhythm control  Here: Same.  Monitor and adjust as appropriate  Severe peripheral arterial disease ([facility])  S/p R CFA/SFA endarterectomy w/ bovine pericardial patch, DCB and stenting of SFA, and angio of AT 5/13/25 ([facility])   > Plavix, Statin, Eliquis  > Monitor neurovascular exam at least daily  > f/u Vascular surgery   Type 2 diabetes mellitus with complication, without long-term current use of insulin ([facility])  Lab Results   Component Value Date    HGBA1C 6.6 (H) 06/18/2025       Recent Labs     07/07/25  1136 07/07/25  1628 07/07/25  2047 07/08/25  0741   POCGLU 175* 219* 198* 133       Blood Sugar Average: Last 72 hrs:  (P) [id number]: Glipizide 10mg BID, Linagliptin 5mg daily (listed as not taking), Metformin 500mg at dinner   Here: Lantus 12 units QHS, Lispro 4 units TID with meals, CDI/Accuchecks.  Will need to transition to or optimize home meds, or else patient will need to diabetes kit/education.  Management as per IM.     Pruritic condition  At home on prednisone 5mg daily  Continued here, but at risk for poor blood sugar control and infection  Monitor for now.     History of Present Illness   Gold Van is a 79 y.o. male who presented to the hospital with septic shock from wound care appointment. Podiatry and was consulted. MRI R foot concerning for OM of  4th metatarsal. He underwent TMA R foot w/ Dr. Galeas on 6/23. Wound cultures w/ pseudomonas and serratia as well as MSSA. Also notable for 1:2 Bcx of MSSA. TTE w/o obvious vegetation. PICC placed 6/30.     Chief Complaint: f/u ambulatory dysfunction    Interval: Patient seen and examined in room. No events overnight.  Reports overall feeling well. Notes no more dizziness after being transferred back to bed last night.  Last BM 7/6.     Objective   Functional Update:  Physical Therapy Occupational Therapy Speech Therapy   Weight Bearing Status: Non-weight bearing (RLE)  Transfers:  (MOD A STS with RW; difficulty maintaining NWB RLE; min-mod A SBT surface to surface transfers)  Bed Mobility: Minimal Assistance  Amulation Distance (ft):  (TBA as able)  Ambulation:  (TBA as able)  Assistive Device for Ambulation:  (TBA as able)  Wheelchair Mobility Distance: 210 ft  Wheelchair Mobility: Minimal Assistance  Discharge Recommendations: Home with:  DC Home with:: Family Support, First Floor Setup, Home Physical Therapy   Eating: Supervision  Grooming: Supervision  Bathing: Minimal Assistance  Bathing: Minimal Assistance  Upper Body Dressing: Minimal Assistance  Lower Body Dressing: Moderate Assistance, Maximum Assistance  Toileting: Total Assistance  Tub/Shower Transfer:  (TBA)  Toilet Transfer: Total Assistance  Cognition: Within Defined Limits  Orientation: Person, Place, Time, Situation                   Temp:  [98.3 °F (36.8 °C)-98.9 °F (37.2 °C)] 98.3 °F (36.8 °C)  HR:  [] 95  Resp:  [18] 18  BP: (105-161)/(58-81) 161/81  SpO2:  [96 %-99 %] 99 %    Physical Exam    Gen: No acute distress,   HEENT: Moist mucus membranes,   Cardiovascular: No edema  Pulmonary: Non-labored breathing.   : No schmitt  GI:  non-distended.  MSK: R TMA dressing c/d/i  Integumentary: Skin is warm, dry. .  Neuro:  Speech is intact. Appropriate to questioning.  Psych: Normal mood and affect.     DVT PPX: eliquis     Scheduled  Meds:  Current Facility-Administered Medications   Medication Dose Route Frequency Provider Last Rate    acetaminophen  650 mg Oral Q6H PRN Annie L Felecial, PA-C      albuterol  2 puff Inhalation Q4H PRN Annie L Chelseynall, PA-C      apixaban  5 mg Oral BID Annie L Dagnall, PA-C      atenolol  25 mg Oral Daily Annie L Dagnall, PA-C      atorvastatin  40 mg Oral QPM Annie L Dagnall, PA-C      benzonatate  200 mg Oral TID PRN Annie L Dagnall, PA-C      Budeson-Glycopyrrol-Formoterol  2 puff Inhalation Q12H [facility] Ilana Payne PA-C      cefepime  2,000 mg Intravenous Q8H Anniesamra Barbosamahadl, PA-C 2,000 mg (07/08/25 0505)    clopidogrel  75 mg Oral Daily Annie L Felecial, PA-C      docusate sodium  100 mg Oral Daily Ashley Depadua, MD      famotidine  20 mg Oral Daily Annie L Chelseynall, PA-C      guaiFENesin  600 mg Oral Q12H SANDRA Annie L Chelseynall, PA-C      insulin glargine  12 Units Subcutaneous HS Annie Barbosanall, PA-C      insulin lispro  1-5 Units Subcutaneous 4x Daily (with meals and at bedtime) Annie Barbosanall, PA-C      insulin lispro  4 Units Subcutaneous TID With Meals Annie Barbosanall, PA-C      lisinopril  5 mg Oral Daily Annie L Chelseynall, PA-C      metFORMIN  500 mg Oral Daily With Dinner Annie L Felecial, PA-C      CALEB ANTIFUNGAL   Topical BID Annie L Dagnall, PA-C      montelukast  10 mg Oral HS Annie L Felecial, PA-C      multivitamin-minerals  1 tablet Oral Daily Annie L Dagnall, PA-C      ondansetron  4 mg Oral Q6H PRN Annie L Dagnall, PA-C      oxyCODONE  2.5 mg Oral Q4H PRN Annie L Dagnall, PA-C      Or    oxyCODONE  5 mg Oral Q4H PRN Annie L Dagnall, PA-C      polyethylene glycol  17 g Oral Daily PRN Annie L Dagnall, PA-C      predniSONE  5 mg Oral Daily Annie L Dagnall, PA-C      sodium chloride  2 spray Each Nare Q1H PRN Annie Martines PA-C           Lab Results: I have reviewed the following results:  Results from last 7 days   Lab Units 07/07/25  0550  07/04/25  0441 07/02/25  0438   HEMOGLOBIN g/dL 9.8* 9.2* 9.4*   HEMATOCRIT % 31.4* 29.2* 30.0*   WBC Thousand/uL 6.75 5.51 6.07   PLATELETS Thousands/uL 281 316 335     Results from last 7 days   Lab Units 07/07/25  0526 07/04/25 0441 07/02/25  0438   BUN mg/dL 30* 39* 30*   SODIUM mmol/L 132* 132* 131*   POTASSIUM mmol/L 4.1 4.1 4.2   CHLORIDE mmol/L 102 103 101   CREATININE mg/dL 0.76 0.77 0.77   AST U/L 17  --   --    ALT U/L 19  --   --               Julianna Snyder MD   Physical Medicine and Rehabilitation   07/08/25    I have spent a total time of 37 minutes in caring for this patient on the day of the visit/encounter including Counseling / Coordination of care, Documenting in the medical record, and Communicating with other healthcare professionals .

## 2025-07-08 NOTE — ASSESSMENT & PLAN NOTE
Lab Results   Component Value Date    HGBA1C 6.6 (H) 06/18/2025       Recent Labs     07/07/25  1136 07/07/25  1628 07/07/25 2047 07/08/25  0741   POCGLU 175* 219* 198* 133       Blood Sugar Average: Last 72 hrs:  (P) [id number]: Glipizide 10mg BID, Linagliptin 5mg daily (listed as not taking), Metformin 500mg at dinner   Here: Lantus 12 units QHS, Lispro 4 units TID with meals, CDI/Accuchecks.  Will need to transition to or optimize home meds, or else patient will need to diabetes kit/education.  Management as per IM.

## 2025-07-08 NOTE — ASSESSMENT & PLAN NOTE
Malnutrition Findings:   Adult Malnutrition type: Chronic illness  Adult Degree of Malnutrition: Other severe protein calorie malnutrition  Malnutrition Characteristics: Inadequate energy, Weight loss                  360 Statement: Severe protein/calorie malnutrition r/t inadequate intake as evidenced by 12.8% unplanned wt loss since 4/8/25, Consuming < 75% energy intake compared to estimated energy needs > 1 month, nonhealing wounds. Treated with oral diet + ONS of Isaac bid and Ensure Plus HP 1 x day    BMI Findings:           Body mass index is 21.41 kg/m².   > Nutrition consulted   >encourage PO intake

## 2025-07-08 NOTE — PROGRESS NOTES
"Physical Therapy Note     07/08/25 0900   Pain Assessment   Pain Assessment Tool 0-10   Pain Score No Pain   Restrictions/Precautions   Precautions Bed/chair alarms;Supervision on toilet/commode;Fall Risk;Limb alert  (orthostatic hypotension. Limb alert RUE. NWB RLE)   Weight Bearing Restrictions Yes   RLE Weight Bearing Per Order NWB  (per podiatry)   Cognition   Overall Cognitive Status WFL   Arousal/Participation Alert;Responsive;Cooperative   Attention Within functional limits   Orientation Level Oriented X4   Memory Within functional limits   Following Commands Follows one step commands without difficulty   Comments Pt agreeable to PT session   Subjective   Subjective \"I'm dizzy\"   Roll Left and Right   Type of Assistance Needed Supervision;Verbal cues   Physical Assistance Level No physical assistance   Comment supervision rolling R with bedrail and HOB elevated   Roll Left and Right CARE Score 4   Sit to Lying   Comment not tested during session   Lying to Sitting on Side of Bed   Type of Assistance Needed Supervision;Verbal cues   Physical Assistance Level No physical assistance   Comment supervision with HOB elevated and use of bed rails   Lying to Sitting on Side of Bed CARE Score 4   Sit to Stand   Type of Assistance Needed Physical assistance;Verbal cues   Physical Assistance Level Total assistance   Comment minAx2 with RW and assist from PT on R to maintain NWB   Sit to Stand CARE Score 1   Bed-Chair Transfer   Type of Assistance Needed Physical assistance;Verbal cues   Physical Assistance Level 26%-50%   Comment modA with slide board bed > wc. ModA from PT to maintain RLE NWB   Chair/Bed-to-Chair Transfer CARE Score 3   Transfer Bed/Chair/Wheelchair   Sit to Stand Minimal Assist;Assist x 2   Stand to Sit Minimal Assist;Assist x 2   Findings bed > wc modA with slide board; modA from PT to assist in maintaining RLE NWB   Car Transfer   Reason if not Attempted Safety concerns   Car Transfer CARE Score 88 "   Walk 10 Feet   Reason if not Attempted Safety concerns   Walk 10 Feet CARE Score 88   Walk 50 Feet with Two Turns   Reason if not Attempted Safety concerns   Walk 50 Feet with Two Turns CARE Score 88   Walk 150 Feet   Reason if not Attempted Safety concerns   Walk 150 Feet CARE Score 88   Walking 10 Feet on Uneven Surfaces   Reason if not Attempted Safety concerns   Walking 10 Feet on Uneven Surfaces CARE Score 88   Ambulation   Findings not tested as pt is to be NWB RLE and required two person assist for STS   Does the patient walk? 1. No, and walking goal is clinically indicated.   Wheel 50 Feet with Two Turns   Type of Assistance Needed Physical assistance;Verbal cues   Physical Assistance Level 25% or less   Comment Virgie level surfaces   Wheel 50 Feet with Two Turns CARE Score 3   Wheel 150 Feet   Reason if not Attempted Safety concerns   Wheel 150 Feet CARE Score 88   Wheelchair mobility   Method Right upper extremity;Left upper extremity   Assistance Provided For Locking Brakes;Remove Leg Rest;Remove armrests;Replace armrests   Distance Level Surface (feet) 70 ft   Findings Virgie for wc propulsion on level surfaces 70'   Does the patient use a wheelchair? 1. Yes   Type of Wheelchair Used 1. Manual   Curb or Single Stair   Reason if not Attempted Safety concerns   1 Step (Curb) CARE Score 88   4 Steps   Reason if not Attempted Safety concerns   4 Steps CARE Score 88   12 Steps   Reason if not Attempted Safety concerns   12 Steps CARE Score 88   Picking Up Object   Reason if not Attempted Safety concerns   Picking Up Object CARE Score 88   Toilet Transfer   Comment not needed during session   Therapeutic Interventions   Strengthening seated LE TE performed sitting EOB. Exercises included: LAQ, hip fleixon, hip abduction 2x15 reps   Other Orthostatic vitals monitored with BP supine 127/66, BP sitting EOB 95/53, BP initially standing 86/56, BP after standing 1 min 69/48 with moderate dizziness and lightheadedness  reported. PT assisted pt back to sitting EOB with cues for ankle pumps and encouraged drinking water, BP after 1 min 95/68; RN and PAJorgeC notified   Assessment   Treatment Assessment Pateint chart reviewed and pt greeted supine in bed and agreeable to PT session reporting 0/10 pain at rest and with mobility. Pt required supervision for sup > sit and rolling in bed and does require verbal and tactile cues to maintain RLE NWB. Pt completed STS to rolling walker with minAx2 and eventual slide board transfer bed > wc modA from PT to maintain RLE NWB. Pt limited in activity tolerance this date due to orthostatic hypotension with symptoms including dizziness and lightheadedness. Blood pressure closely monitored as above and session modified as needed; RN and PA-C made aware of BP/session outcomes. Pt continues to require skilled PT intervention to address impairments, decrease fall risk, maximize safety and independence.   Family/Caregiver Present no   Problem List Decreased strength;Decreased endurance;Impaired balance;Decreased mobility;Decreased safety awareness;Decreased skin integrity   Barriers to Discharge Decreased caregiver support;Inaccessible home environment   PT Barriers   Physical Impairment Decreased strength;Decreased endurance;Impaired balance;Decreased mobility;Decreased skin integrity   Functional Limitation Car transfers;Stair negotiation;Standing;Transfers;Walking;Wheelchair management   Plan   Treatment/Interventions Functional transfer training;LE strengthening/ROM;Therapeutic exercise;Endurance training;Patient/family training;Bed mobility;Gait training;Spoke to nursing;Continued evaluation;Spoke to advanced practitioner   Progress Progressing toward goals   PT Therapy Minutes   PT Time In 0900   PT Time Out 1000   PT Total Time (minutes) 60   PT Mode of treatment - Individual (minutes) 60   PT Mode of treatment - Concurrent (minutes) 0   PT Mode of treatment - Group (minutes) 0   PT Mode of  treatment - Co-treat (minutes) 0   PT Mode of Treatment - Total time(minutes) 60 minutes   PT Cumulative Minutes 180   Therapy Time missed   Time missed? No

## 2025-07-08 NOTE — PATIENT INSTRUCTIONS
Orders Placed This Encounter   Procedures    Wound cleansing and dressings Right Foot     Right foot surgical     Wash your hands with soap and water.  Remove old dressing, discard into plastic bag and place in trash.  Cleanse the wound with vashe wound cleanser if available (if not available may cleanse with unscented soap and water)  prior to applying a clean dressing. Do not use tissue or cotton balls. Do not scrub the wound. Pat dry using gauze.  Shower no     Apply AMD packing lightly pack into the foot  wound.  Cover with ABD pad   Secure with becki and tape   Change dressing every other day    Surgical shoe to right foot   May ambulate only in surgical shoe     Increase protein in your diet with each meal. 3 to 4 servings per day Meat, chicken, eggs, nut, greek yogurt, legumes, and  lentils are good sources of protein. Isaac protein shakes     Follow up in 1 week     Standing Status:   Future     Expiration Date:   7/15/2025    Wound cleansing and dressings Left Pretibial     Left pretibial leg     Wash your hands with soap and water.  Remove old dressing, discard into plastic bag and place in trash.  Cleanse the wound with unscented soap and water  prior to applying a clean dressing. Do not use tissue or cotton balls. Do not scrub the wound. Pat dry using gauze.  Shower no     Apply dermagran to the leg wound.  Cover with silicone bordered foam   Change dressing every other day     Follow up in 1 week     Standing Status:   Future     Expiration Date:   7/15/2025

## 2025-07-08 NOTE — WOUND OSTOMY CARE
Consult Note - Wound   Gold Van 79 y.o. male MRN: [id number]  Unit/Bed#: -01 Encounter: [id number]        History and Present Illness: Patient is seen for wound care consult today . 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 Patient seen at the wound center today for the Right TMA . PMH of HTN,HLD,PAF,CHF, cardiomyopathy / orthostatic hypotension , Type 2 diabetes , HX of CVA , PAD , GERD, COPD, asthma , CKD stage 2 , chronic hyponatremia ,  MSSA bacteremia secondary to right foot osteomyelitis and severe protein malnutrition . Patient is continent of bowel and bladder . He is on a Roho cushion when in the chair and has a P - 500 low air loss mattress        Assessment Findings:   Sacral right buttocks - POA evolving DTI area is full thickness oval in shape with 80%  granular tissue and 20% slough noted . No noted maceration   Groin area is intact and resolving candidiasis rash   Left buttocks area is resolved and new epithelialized tissue intact   Left anterior lower leg tibia - partial thickness wound bed 100% pink scant bloody drainage   Right surgical wound seen by wound care today Dr. Galeas podiatry   Right heel dry and intact all pink and blanchable   Right tibia scattered scabbed areas that area dry and intact   Right knee - dry scabbed area that is intact   Left arm - partial thickness skin tear that is 100% pink fragile tissue small bloody drainage   Left posterior tibia - is dry and intact        Skin Care orders:  1-Hydraguard to Left heels 2 times per day and as needed    2-EHOB/WAFFLE or Roho  cushion when out of bed in chair.  3-Moisturize skin daily with skin nourishing cream  4-Elevate heels to offload pressure. EHOB off loading boots when in bed   5-Turn/reposition every 2 hours to offload and prevent pressure   6. Groin - cleanse with soap and water then pat dry Apply delfina antifungal cream 2 times per day and as needed   7.  Apply silicone foam to the right heel ramone with a P an ddate peel and check skin integrity every shift change every 3 days   8. Cleanse sacrum, buttocks with soap and water apply triad paste to the wound on the right buttocks only then a silicone foam ramone with a T and date check skin integrity every shift and change every other day or when soiled   9. Left arm skin tear - cleanse with Normal saline then apply dermagram doubled then a silicone foam ramone with a T and date change every other day   10. Left tibial wound - cleanse with Normal saline then apply dermagran then top with a silicone foam ramone with a T and date change every other day   11. P 500 low air loss mattress   12. Dr. 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         Wounds:  Wound 06/17/25 Pressure Injury Buttocks Mid;Right (Active)   Wound Image   07/08/25 1340   Wound Description Beefy red;Fragile;Slough 07/08/25 1340   Pressure Injury Stage DTPI 07/08/25 1340   Non-staged Wound Description Full thickness 07/08/25 1340   Wound Length (cm) 1.5 cm 07/08/25 1340   Wound Width (cm) 0.5 cm 07/08/25 1340   Wound Depth (cm) 0.2 cm 07/08/25 1340   Wound Surface Area (cm^2) 0.59 cm^2 07/08/25 1340   Wound Volume (cm^3) 0.079 cm^3 07/08/25 1340   Calculated Wound Volume (cm^3) 0.15 cm^3 07/08/25 1340   Change in Wound Size % 60.53 07/08/25 1340   Drainage Amount Small 07/08/25 1340   Drainage Description Serosanguineous 07/08/25 1340   Miranda-wound Assessment Dry;Intact;Fragile 07/08/25 1340   Treatments Cleansed;Irrigation with NSS;Site care 07/08/25 1340   Dressing Other (Comment) 07/08/25 1340   Wound packed? No 07/03/25 1101   Dressing Changed Changed 07/08/25 1340   Patient Tolerance Tolerated well 07/08/25 1340   Dressing Status Clean;Dry;Intact 07/07/25 2142       Wound 05/16/25 Traumatic Skin Tear Pretibial Left (Active)   Wound Image   07/08/25 1335   Wound Description Clean;Fragile;Pink 07/08/25 0645   Non-staged Wound Description Partial  thickness 07/08/25 1335   Wound Length (cm) 1 cm 07/08/25 1335   Wound Width (cm) 0.3 cm 07/08/25 1335   Wound Depth (cm) 0.1 cm 07/08/25 1335   Wound Surface Area (cm^2) 0.24 cm^2 07/08/25 1335   Wound Volume (cm^3) 0.016 cm^3 07/08/25 1335   Calculated Wound Volume (cm^3) 0.03 cm^3 07/08/25 1335   Change in Wound Size % 89.66 07/08/25 1335   Drainage Amount Scant 07/08/25 1335   Drainage Description Bloody 07/08/25 1335   Miranda-wound Assessment Clean;Dry;Intact 07/08/25 1335   Treatments Cleansed;Irrigation with NSS;Site care 07/08/25 1335   Dressing Dermagran gauze;Foam, Silicon (eg. Allevyn, etc) 07/08/25 1335   Wound packed? No 07/03/25 1038   Dressing Changed Reinforced 07/08/25 1335   Patient Tolerance Tolerated well 07/08/25 1335   Dressing Status Intact 07/08/25 1057       Wound 06/18/25 Groin (Active)   Wound Image   07/08/25 1343   Wound Description Clean;Dry;Intact 07/08/25 1343   Non-staged Wound Description Not applicable 07/03/25 1051   Wound Length (cm) 0 cm 07/08/25 1343   Wound Width (cm) 0 cm 07/08/25 1343   Wound Depth (cm) 0 cm 07/08/25 1343   Wound Surface Area (cm^2) 0 cm^2 07/08/25 1343   Wound Volume (cm^3) 0 cm^3 07/08/25 1343   Calculated Wound Volume (cm^3) 0 cm^3 07/08/25 1343   Change in Wound Size % 100 07/08/25 1343   Drainage Amount None 07/08/25 1343   Miranda-wound Assessment Clean;Dry;Intact 07/08/25 1343   Treatments Other (Comment) 07/08/25 1343   Dressing Open to air 07/08/25 1343   Wound packed? No 07/04/25 0830   Dressing Changed Changed 07/04/25 0830   Patient Tolerance Tolerated well 07/08/25 1343   Dressing Status Clean;Dry;Intact 07/07/25 0845       Wound 06/18/25 Pretibial Right (Active)   Wound Image   07/08/25 1333   Wound Description Clean;Dry;Intact 07/08/25 1333   Non-staged Wound Description Not applicable 07/08/25 1333   Wound Length (cm) 0 cm 07/08/25 1333   Wound Width (cm) 0 cm 07/08/25 1333   Wound Depth (cm) 0 cm 07/08/25 1333   Wound Surface Area (cm^2) 0 cm^2  07/08/25 1333   Wound Volume (cm^3) 0 cm^3 07/08/25 1333   Calculated Wound Volume (cm^3) 0 cm^3 07/08/25 1333   Change in Wound Size % 100 07/08/25 1333   Drainage Amount None 07/08/25 1333   Drainage Description GUILLE 07/05/25 1930   Miranda-wound Assessment Clean;Dry;Intact 07/08/25 1333   Treatments Other (Comment) 07/08/25 1333   Dressing Open to air 07/08/25 1333   Wound packed? No 07/04/25 0830   Dressing Changed Changed 07/03/25 1220   Patient Tolerance Tolerated well 07/08/25 1333   Dressing Status Other (Comment) 07/08/25 1101       Wound 06/23/25 Traumatic Skin Tear Knee Anterior;Right (Active)   Wound Image   07/08/25 1346   Wound Description Clean;Dry;Intact;Epithelialization 07/08/25 1346   Non-staged Wound Description Not applicable 07/08/25 1346   Wound Length (cm) 0 cm 07/08/25 1346   Wound Width (cm) 0 cm 07/08/25 1346   Wound Depth (cm) 0 cm 07/08/25 1346   Wound Surface Area (cm^2) 0 cm^2 07/08/25 1346   Wound Volume (cm^3) 0 cm^3 07/08/25 1346   Calculated Wound Volume (cm^3) 0 cm^3 07/08/25 1346   Change in Wound Size % 100 07/08/25 1346   Drainage Amount None 07/08/25 1346   Drainage Description Serosanguineous 07/04/25 0830   Miranda-wound Assessment Clean;Dry;Intact 07/08/25 1346   Treatments Other (Comment) 07/08/25 1346   Dressing Open to air 07/08/25 1346   Wound packed? No 07/03/25 1046   Dressing Changed Changed 07/03/25 1220   Patient Tolerance Tolerated well 07/08/25 1346   Dressing Status Other (Comment) 07/08/25 1103       Wound 06/17/25 Pressure Injury Buttocks Left (Active)   Wound Image   07/08/25 1344   Wound Description Dry;Intact;Fragile;Epithelialization;Pink 07/08/25 1344   Pressure Injury Stage DTPI resolved  07/03/25 1102   Non-staged Wound Description Not applicable 07/08/25 1344   Wound Length (cm) 0 cm 07/08/25 1344   Wound Width (cm) 0 cm 07/08/25 1344   Wound Depth (cm) 0 cm 07/08/25 1344   Wound Surface Area (cm^2) 0 cm^2 07/08/25 1344   Wound Volume (cm^3) 0 cm^3 07/08/25  1344   Calculated Wound Volume (cm^3) 0 cm^3 07/08/25 1344   Change in Wound Size % 100 07/08/25 1344   Drainage Amount None 07/08/25 1344   Drainage Description Serosanguineous 06/30/25 1050   Miranda-wound Assessment Dry;Intact 07/08/25 1344   Treatments Other (Comment) 07/08/25 1344   Dressing Foam, Silicon (eg. Allevyn, etc) 07/08/25 1344   Wound packed? No 07/03/25 1102   Dressing Changed Changed 07/08/25 1344   Patient Tolerance Tolerated well 07/08/25 1344   Dressing Status Clean;Dry;Intact 07/07/25 2142       Wound 06/28/25 Arm Anterior;Left (Active)   Wound Image   07/08/25 1355   Wound Description Clean;Fragile;Pink 07/08/25 1355   Non-staged Wound Description Partial thickness 07/08/25 1355   Wound Length (cm) 1 cm 07/08/25 1355   Wound Width (cm) 1 cm 07/08/25 1355   Wound Depth (cm) 0.1 cm 07/08/25 1355   Wound Surface Area (cm^2) 0.79 cm^2 07/08/25 1355   Wound Volume (cm^3) 0.052 cm^3 07/08/25 1355   Calculated Wound Volume (cm^3) 0.1 cm^3 07/08/25 1355   Change in Wound Size % 75 07/08/25 1355   Drainage Amount Small 07/08/25 1355   Drainage Description Bloody 07/08/25 1355   Miranda-wound Assessment Clean;Dry;Intact 07/08/25 1355   Treatments Cleansed;Irrigation with NSS;Site care 07/08/25 1355   Dressing Dermagran gauze;Foam, Silicon (eg. Allevyn, etc) 07/08/25 1355   Wound packed? No 07/03/25 1052   Dressing Changed Changed 07/08/25 1355   Patient Tolerance Tolerated well 07/08/25 1355   Dressing Status Clean;Dry;Intact 07/07/25 2142       Wound 07/03/25 Tibial Left;Posterior (Active)   Wound Image   07/08/25 1337   Wound Description Clean;Dry;Intact 07/08/25 1337   Non-staged Wound Description Not applicable 07/08/25 1337   Wound Length (cm) 0 cm 07/08/25 1337   Wound Width (cm) 0 cm 07/08/25 1337   Wound Depth (cm) 0 cm 07/08/25 1337   Wound Surface Area (cm^2) 0 cm^2 07/08/25 1337   Wound Volume (cm^3) 0 cm^3 07/08/25 1337   Calculated Wound Volume (cm^3) 0 cm^3 07/08/25 1337   Change in Wound Size %  100 07/08/25 1337   Drainage Amount None 07/08/25 1337   Miranda-wound Assessment Clean;Dry;Intact 07/08/25 1337   Treatments Site care 07/08/25 1337   Dressing Open to air 07/08/25 1337   Wound packed? No 07/03/25 1105   Dressing Changed Changed 07/03/25 1105   Patient Tolerance Tolerated well 07/08/25 1337   Dressing Status Other (Comment) 07/08/25 1103       Wound 07/03/25 Heel Right (Active)   Wound Image   07/08/25 1438   Wound Description Clean;Dry;Intact 07/08/25 1438   Non-staged Wound Description Not applicable 07/08/25 1438   Wound Length (cm) 0 cm 07/08/25 1438   Wound Width (cm) 0 cm 07/08/25 1438   Wound Depth (cm) 0 cm 07/08/25 1438   Wound Surface Area (cm^2) 0 cm^2 07/08/25 1438   Wound Volume (cm^3) 0 cm^3 07/08/25 1438   Calculated Wound Volume (cm^3) 0 cm^3 07/08/25 1438   Drainage Amount None 07/08/25 1438   Miranda-wound Assessment Clean;Dry;Intact 07/08/25 1438   Treatments Site care 07/08/25 1438   Dressing Foam, Silicon (eg. Allevyn, etc) 07/08/25 1438   Wound packed? No 07/03/25 1108   Dressing Changed Changed 07/08/25 1438   Patient Tolerance Tolerated well 07/08/25 1438   Dressing Status Other (Comment) 07/08/25 1104       Wound care will follow weekly secure chat with questions     Shea BILLINGSLEYN RN CWOCN

## 2025-07-08 NOTE — PROGRESS NOTES
07/08/25 1250   Pain Assessment   Pain Assessment Tool 0-10   Pain Score 4   Pain Location/Orientation Location: Buttocks   Restrictions/Precautions   Precautions Bed/chair alarms;Fall Risk;Pain;Supervision on toilet/commode;Limb alert;Multiple lines  (Orthostatic hypotension)   RLE Weight Bearing Per Order WBAT  (Through heel with surgical shoe)   ROM Restrictions No   Grooming   Able To Wash/Dry Face;Wash/Dry Hands   Limitation Noted In Safety;Sequencing;Strength   Findings Set-up   Shower/Bathe Self   Type of Assistance Needed Incidental touching   Physical Assistance Level   (CGA when standing to wash perineal and buttocks)   Shower/Bathe Self CARE Score 4   Bathing   Assessed Bath Style Sponge Bath   Anticipated D/C Bath Style Shower;Sponge Bath   Able to Gather/Transport No   Able to Wash/Rinse/Dry (body part) Left Arm;Right Arm;L Upper Leg;R Upper Leg;L Lower Leg/Foot;R Lower Leg/Foot;Chest;Abdomen;Perineal Area;Buttocks  (R foot covered)   Limitations Noted in Balance;Endurance;ROM;Safety;Strength   Positioning Seated;Standing   Tub/Shower Transfer   Reason Not Assessed Sponge Bath   Upper Body Dressing   Type of Assistance Needed Set-up / clean-up   Physical Assistance Level No physical assistance   Upper Body Dressing CARE Score 5   Lower Body Dressing   Type of Assistance Needed Physical assistance   Physical Assistance Level 25% or less   Comment Assist to pull undergarment and shorts up from knees to hips   Lower Body Dressing CARE Score 3   Putting On/Taking Off Footwear   Type of Assistance Needed Supervision   Physical Assistance Level No physical assistance   Putting On/Taking Off Footwear CARE Score 4   Dressing/Undressing Clothing   Remove UB Clothes Pullover Shirt   Don UB Clothes Pullover Shirt   Remove LB Clothes Shorts;Undergarment;Socks   Don LB Clothes Shorts;Undergarment;Socks   Limitations Noted In Balance;Endurance;Safety;Strength;ROM   Positioning Supported Sit;Standing   Sit to Stand    Type of Assistance Needed Incidental touching   Physical Assistance Level   (CGA)   Comment OTS assisted pt with multiple sit to stands during wound care; CGA   Sit to Stand CARE Score 4   Toileting Hygiene   Reason if not Attempted   (Pt did not have to void during session)   Toilet Transfer   Reason if not Attempted   (Pt did not have to void during session)   Cognition   Overall Cognitive Status WFL   Arousal/Participation Alert;Responsive;Cooperative   Attention Within functional limits   Orientation Level Oriented X4   Memory Decreased recall of precautions   Following Commands Follows one step commands without difficulty   Additional Activities   Additional Activities Other (Comment)   Additional Activities Comments OT and OTS educated pt on new WB status and sx shoe use; pt eager to impliment and verbalized understanding   Other Comments   Assessment Pt participated in 70 minutes concurrent tx with another pt with similar goals.   Assessment   Treatment Assessment Pt agreeable to OT session this PM, received sitting upright in w/c. ADL and education session completed; current LOF and details listed in respective sections. Pt overall set-up grooming and UB dressing; CGA bathing and sit <> stand transfer; Virgie LB dressing; supervision donning/doffing footwear. OT and OTS educated pt on new WB status and sx shoe use, pt verbalized understanding and eager to impliment. Pt reported he felt slightly dizzy after standing for dressing; BP checked and read 93/52 and abdominal binder put on after donning clean clothes. OTS assisted pt with sit <> stand transfers while wound care nurse assessed pt. Pt tolerated session well and had no c/o pain or fatigue. Pt's barriers impacting performance include decreased strength, decreased ROM, decreased endurance, impaired balance, decreased safety awareness, decreased skin integrity, and pain. Pt to benefit from continued skilled OT services to address listed barriers and  prepare for safe d/c home.   Prognosis Good   Problem List Decreased strength;Decreased endurance;Impaired balance;Pain;Decreased range of motion;Decreased safety awareness;Decreased skin integrity   Plan   Treatment/Interventions ADL retraining;Functional transfer training;Therapeutic exercise;Endurance training;Patient/family training;Equipment eval/education;Compensatory technique education;OT   Progress Progressing toward goals   OT Therapy Minutes   OT Time In 1250   OT Time Out 1400   OT Total Time (minutes) 70   OT Mode of treatment - Individual (minutes) 0   OT Mode of treatment - Concurrent (minutes) 70   Therapy Time missed   Time missed? No

## 2025-07-08 NOTE — PROGRESS NOTES
Wound Procedure Treatment Right Foot    Performed by: Gail Guzman RN  Authorized by: Agustin Galeas DPM  Associated wounds:   Wound 05/16/25 Surgical Foot Right    Wound cleansed with:  NSS   Applied primary dressing:  Packing and Other   Applied secondary dressing:  Gauze and ABD   Dressing secured with:  Jarad, Tape and Tubifast   Comments:  AMD packing lightly packed into wound, tail taped to periwound top of foot

## 2025-07-08 NOTE — NURSING NOTE
[facility]  RN Shift Note        Vitals  Vital Signs  Temperature: 98.9 °F (37.2 °C)  Temp Source: Temporal  Pulse: 82  Heart Rate Source: Monitor  Respirations: 18  Blood Pressure: 135/59  MAP (mmHg): 85  BP Location: Left arm  BP Method: Automatic  Patient Position - Orthostatic VS: Lying        Follow up/Interventions- WNL   Pain Pain Score: 0  Hospital Pain Intervention(s): Declines    Follow up/Interventions- WNL   GI   (WNL/X)  LBM  Incontinence (yes/no)     Gastrointestinal (WDL): Within Defined Limits  Last BM Date: 07/06/25  Bowel Incontinence: No    Follow up/Interventions- WNL      (WNL/X)  Incontinence (yes/no)  Bladder Scan  Urine Output  Shift Total Output  Unmeasured Occurrence   Genitourinary (WDL): Within Defined Limits  Urinary Incontinence: No    Urine: 600 mL    Unmeasured Urine Occurrence: 1 (Tthere was no hat in the bed side commode to measure void)         Nutrition  Diet/Precautions   PO Intake  Meal Consumption   Nutrition  Diet Type: Diabetic  P.O.: 236 mL  Percent Meals Eaten (%): 25         LDA  Drain Output           Follow up/Interventions- Dsg clean, dry and intact to PICC RUE.   Skin   (WNL/X)  Integrity  Pressure Injury YES/NO: yes     Integumentary (WDL): Exceptions to WDL  Skin Integrity: Bruising, Excoriation  Description stage II sacrum. R foot TMA    Follow up/Interventions- mepilex clean, dry and intact to sacrum. Dsg clean, dry and intact to R foot TMA   Behavior/Mood  Behavior log YES/NO: no Patient Behaviors/Mood: Calm, Cooperative       Sleep Pattern  Sleep log no        Education  Medication/Device        ADL/Mobility Summary  (transfer, bed mobility, ambulation)   SPT and slideboard transfer

## 2025-07-08 NOTE — PCC NURSING
7/8/25 Admit to [facility] on 7/5/25 s/p R foot TMA. Alert and oriented. Lungs clear/decreased on RA. Pt has moist cough. Scheduled mucinex and prn tesalon rick for cough. HR regular. Trace edema RLE. Pt has multiple scabbed/healing skin tears to bilateral arms and legs. Stage II sacrum receiving triad and allevyn. Sutures clean,dry and intact to R foot TMA incision. Incision dressed with adaptic and kerlix wrap daily. Pt is NWB to RLE. Pt is receiving long term IV abt via PICC to RUE. [facility]u checks with sliding scale and lantus insulin. Pt has not been on insulin prior to hospital stay. Pt has been experiencing dizzy spells when OOB during the day, dizziness resolves upon lying back down in bed. Continent of bowel and bladder. Pt has been free from falls while in [facility].     7/15/25- A&Ox4, HRR lungs clear diminished on RA. Continue with nonproductive cough- PRN tessalon perles-effectual.Trace edema to RLE/foot. Skin-very fragile, multiple scabbed/healing ST to b/l arms/legs. Refer to wound orders for treatment. Pressure injury on sacrum-triad past and allevyn daily. Right foot TMA amputation site dressing CDI with adaptic and kerlix every other day. Full heel weight bearing to RLE-surgical shoe with ambulation/transfers. PICC RUE-q8 hr antibiotic therapy. No blood return noted on line. [facility]-DM level 3 diet with Isaac and ensure supplement. Pt remains on SSI and long acting along with home metformin dose, education continued regarding insulin needs. Pt gets dizzy when OOB and resolves buy lying back down. Cont of b&b, urinal. Free from falls.- Prisca Winters RN    7/21/2025- Pt expected discharged tomorrow, apt with wound care at 0900 prior to discharge; R TMA. Dressing clean, dry, intact at this time. Pt with generalized scattered scabs, skin fragile. Stage 2 on pts sacrum, triad paste in use. Pt A&Ox4, RA, continent of bowel and bladder, standby assist oob, surgical shoe in place. MRSA + in the  misti. No complaints of pain at this time. R PICC line for long term abx. Pt receiving iv abx q8 hours, ongoing education with pt and pts wife. No blood return noted, providers aware. -Lissa Paulson RN

## 2025-07-08 NOTE — PROGRESS NOTES
Wound Procedure Treatment Left Pretibial    Performed by: Gail Guzman RN  Authorized by: Agustin Galeas DPM  Associated wounds:   Wound 05/16/25 Traumatic Skin Tear Pretibial Left    Wound cleansed with:  NSS   Applied primary dressing:  Dermagran and Silicone bordered foam

## 2025-07-08 NOTE — ASSESSMENT & PLAN NOTE
2/2 nonhealing wound, presented in septic shock.  S/p TMA on 6/23 by Dr. Galeas  Unclear if source control  OR Cultures: Serratia, MSSA, Pseudomonas  Blood cultures 6/26 with NGTD  > Per ID continue Cefepime through 8/6 for 6 weeks treatment  > PICC placed on 6/30  > Monitor labs at least weekly for toxicities  > Pain management  > Close incisional monitoring and care.  > NWB RLE  > PT/OT 3-5 hours/day, 5-7 days/week  > f/u Podiatry ~ 7/14 for 2 week f/u   Patient f/u w/ Dr. Galeas in office today for dressing change and possible clearance to weightbear through heel.   > outpatient f/u with ID   - 7/22 Home PT/OT/RN

## 2025-07-08 NOTE — PROGRESS NOTES
Name: Gold Van      : 1945      MRN: [id number]  Encounter Provider: Agustin Galeas DPM  Encounter Date: 2025   Encounter department: [facility] [place]  :  Assessment & Plan  Dehiscence of operative wound, initial encounter    Lab Results   Component Value Date    HGBA1C 6.6 (H) 2025       Orders:    Wound cleansing and dressings Right Foot; Future    Wound cleansing and dressings Left Pretibial; Future    Wound Procedure Treatment Right Foot    Wound Procedure Treatment Left Pretibial  -Sutures removed today with some dehiscence laterally, no probe to bone, granular base, AMD packing applied, will need weekly debridement for this  - He can escalate his weightbearing status to full heel weightbearing with attempting to avoid heel-to-toe gait weightbearing as tolerated surgical shoe  Noninfected skin tear of left lower extremity, initial encounter       -Advised to be careful about having skin insults and to be vigilant of his wheelchair, advised use of Dermagran as contact layer  Type 2 diabetes mellitus with complication, without long-term current use of insulin (Formerly [facility])    Lab Results   Component Value Date    HGBA1C 6.6 (H) 2025          - Continue strict glycemic control, continue supplementation with Isaac      History of Present Illness   Chief Complaint   Patient presents with    Follow Up Wound Care Visit     Right foot      Here for wound follow up.  HPI  Objective   /64   Pulse 104   Temp 97.7 °F (36.5 °C) (Temporal)   Resp 20     Physical Exam    Musculoskeletal:      Comments: There is ulceration on the distal lateral aspect of the TMA site, no probe to bone, granular base following debridement, no other areas of dehiscence fixation removal    There are multiple skin insults, thankfully the only 1 that has drainage is anterior aspect of the left lower extremity.  Limited breakdown of skin       Wound 25 Pressure Injury Buttocks  Mid;Right (Active)   Wound Image   07/05/25 1831   Wound Description GUILLE 07/07/25 2142   Pressure Injury Stage DTPI 07/03/25 1101   Non-staged Wound Description Full thickness 07/03/25 1101   Wound Length (cm) 0.4 cm 07/03/25 1101   Wound Width (cm) 0.2 cm 07/03/25 1101   Wound Depth (cm) 0.2 cm 07/03/25 1101   Wound Surface Area (cm^2) 0.06 cm^2 07/03/25 1101   Wound Volume (cm^3) 0.008 cm^3 07/03/25 1101   Calculated Wound Volume (cm^3) 0.02 cm^3 07/03/25 1101   Change in Wound Size % 94.74 07/03/25 1101   Drainage Amount None 07/03/25 1101   Drainage Description Serosanguineous 06/30/25 1054   Miranda-wound Assessment Dry;Intact;Pink 07/03/25 1101   Treatments Cleansed;Site care 07/03/25 1101   Dressing Foam, Silicon (eg. Allevyn, etc) 07/07/25 2142   Wound packed? No 07/03/25 1101   Dressing Changed Changed 07/06/25 2008   Patient Tolerance Tolerated well 07/03/25 1101   Dressing Status Clean;Dry;Intact 07/07/25 2142       Wound 05/16/25 Traumatic Skin Tear Pretibial Left (Active)   Wound Image   07/08/25 1051   Wound Description Pink;Epithelialization 07/08/25 1057   Non-staged Wound Description Partial thickness 07/08/25 1057   Wound Length (cm) 1 cm 07/08/25 1057   Wound Width (cm) 0.3 cm 07/08/25 1057   Wound Depth (cm) 0.1 cm 07/08/25 1057   Wound Surface Area (cm^2) 0.24 cm^2 07/08/25 1057   Wound Volume (cm^3) 0.016 cm^3 07/08/25 1057   Calculated Wound Volume (cm^3) 0.03 cm^3 07/08/25 1057   Change in Wound Size % 89.66 07/08/25 1057   Drainage Amount Small 07/08/25 1057   Drainage Description Serosanguineous 07/08/25 1057   Miranda-wound Assessment Intact 07/08/25 1057   Treatments Cleansed 07/08/25 1057   Dressing Open to air 07/07/25 2142   Wound packed? No 07/03/25 1038   Dressing Changed New 07/03/25 1038   Patient Tolerance Tolerated well 07/03/25 1038   Dressing Status Intact 07/08/25 1057       Wound 05/16/25 Surgical Foot Right (Active)   Wound Image    07/08/25 1128   Enter Mills score: Mills Grade  2: Deep ulcer extended to ligament, tendon, joint capsule, bone, or deep fascia without abscess or osteomyelitis (OM) 06/10/25 1013   Wound Description Other (Comment);Epithelialization 07/08/25 1059   Non-staged Wound Description Full thickness 06/10/25 1013   Wound Length (cm) 0.2 cm 07/08/25 1059   Wound Width (cm) 16 cm 07/08/25 1059   Wound Depth (cm) 0.1 cm 07/08/25 1059   Wound Surface Area (cm^2) 2.51 cm^2 07/08/25 1059   Wound Volume (cm^3) 0.168 cm^3 07/08/25 1059   Calculated Wound Volume (cm^3) 0.32 cm^3 07/08/25 1059   Change in Wound Size % 99.05 07/08/25 1059   Drainage Amount None 07/08/25 1059   Drainage Description Serous 06/28/25 1600   Miranda-wound Assessment Intact;Dry;Scaly;Edema 07/08/25 1059   Treatments Site care 07/06/25 0807   Wound Site Closure Sutures 07/08/25 1059   Dressing Dry dressing;Vaseline gauze 07/07/25 2142   Wound packed? No 07/01/25 1500   Packing- # removed 2 06/10/25 1013   Dressing Changed Changed 07/06/25 2008   Patient Tolerance Tolerated well 07/03/25 1220   Dressing Status Intact 07/08/25 1059       Wound 06/18/25 Groin (Active)   Wound Image   07/03/25 1051   Wound Description Clean;Dry;Intact 07/08/25 0845   Non-staged Wound Description Not applicable 07/03/25 1051   Wound Length (cm) 0 cm 07/03/25 1051   Wound Width (cm) 0 cm 07/03/25 1051   Wound Depth (cm) 0 cm 07/03/25 1051   Wound Surface Area (cm^2) 0 cm^2 07/03/25 1051   Wound Volume (cm^3) 0 cm^3 07/03/25 1051   Calculated Wound Volume (cm^3) 0 cm^3 07/03/25 1051   Change in Wound Size % 100 07/03/25 1051   Drainage Amount None 07/06/25 0807   Miranda-wound Assessment Clean;Dry;Intact 07/06/25 0807   Treatments Site care;Other (Comment) 07/06/25 0807   Dressing Open to air 07/08/25 0845   Wound packed? No 07/04/25 0830   Dressing Changed Changed 07/04/25 0830   Patient Tolerance Tolerated well 07/05/25 0830   Dressing Status Clean;Dry;Intact 07/07/25 0845       Wound 06/18/25 Pretibial Right (Active)   Wound Image    07/08/25 1044   Wound Description Dry;Other (Comment);Epithelialization 07/08/25 1101   Non-staged Wound Description Partial thickness 07/04/25 0830   Wound Length (cm) 0 cm 07/08/25 1101   Wound Width (cm) 0 cm 07/08/25 1101   Wound Depth (cm) 0 cm 07/08/25 1101   Wound Surface Area (cm^2) 0 cm^2 07/08/25 1101   Wound Volume (cm^3) 0 cm^3 07/08/25 1101   Calculated Wound Volume (cm^3) 0 cm^3 07/08/25 1101   Change in Wound Size % 100 07/08/25 1101   Drainage Amount None 07/08/25 1101   Drainage Description GUILLE 07/05/25 1930   Miranda-wound Assessment Clean;Dry;Intact 07/08/25 1101   Treatments Site care 07/06/25 0807   Dressing Open to air 07/07/25 2142   Wound packed? No 07/04/25 0830   Dressing Changed Changed 07/03/25 1220   Patient Tolerance Tolerated well 07/04/25 0830   Dressing Status Other (Comment) 07/08/25 1101       Wound 06/23/25 Traumatic Skin Tear Knee Anterior;Right (Active)   Wound Image   07/08/25 1042   Wound Description Epithelialization 07/08/25 1103   Non-staged Wound Description Partial thickness 07/04/25 0830   Wound Length (cm) 0 cm 07/08/25 1103   Wound Width (cm) 0 cm 07/08/25 1103   Wound Depth (cm) 0 cm 07/08/25 1103   Wound Surface Area (cm^2) 0 cm^2 07/08/25 1103   Wound Volume (cm^3) 0 cm^3 07/08/25 1103   Calculated Wound Volume (cm^3) 0 cm^3 07/08/25 1103   Change in Wound Size % 100 07/08/25 1103   Drainage Amount None 07/08/25 1103   Drainage Description Serosanguineous 07/04/25 0830   Miranda-wound Assessment Clean;Dry;Intact 07/07/25 0845   Treatments Cleansed;Site care 07/04/25 0830   Dressing Dermagran gauze;Dry dressing 07/06/25 2008   Wound packed? No 07/03/25 1046   Dressing Changed Changed 07/03/25 1220   Patient Tolerance Tolerated well 07/03/25 1046   Dressing Status Other (Comment) 07/08/25 1103       Wound 06/17/25 Pressure Injury Buttocks Left (Active)   Wound Image   07/03/25 1102   Wound Description GUILLE 07/07/25 2142   Pressure Injury Stage DTPI 07/03/25 1102    Non-staged Wound Description Full thickness 07/03/25 1220   Wound Length (cm) 2.6 cm 07/03/25 1102   Wound Width (cm) 0.7 cm 07/03/25 1102   Wound Depth (cm) 0.3 cm 07/03/25 1102   Wound Surface Area (cm^2) 1.43 cm^2 07/03/25 1102   Wound Volume (cm^3) 0.286 cm^3 07/03/25 1102   Calculated Wound Volume (cm^3) 0.55 cm^3 07/03/25 1102   Change in Wound Size % -2650 07/03/25 1102   Drainage Amount None 07/06/25 0807   Drainage Description Serosanguineous 06/30/25 1050   Miranda-wound Assessment Dry;Intact;Pink 07/03/25 1102   Treatments Cleansed;Site care 07/04/25 0830   Dressing Foam, Silicon (eg. Allevyn, etc) 07/07/25 2142   Wound packed? No 07/03/25 1102   Dressing Changed Changed 07/03/25 1220   Patient Tolerance Tolerated well 07/03/25 1102   Dressing Status Clean;Dry;Intact 07/07/25 2142       Wound 06/28/25 Arm Anterior;Left (Active)   Wound Image   07/05/25 1825   Wound Description GUILLE 07/07/25 2142   Non-staged Wound Description Partial thickness 07/03/25 1052   Wound Length (cm) 1.5 cm 07/03/25 1052   Wound Width (cm) 3 cm 07/03/25 1052   Wound Depth (cm) 0.2 cm 07/03/25 1052   Wound Surface Area (cm^2) 3.53 cm^2 07/03/25 1052   Wound Volume (cm^3) 0.471 cm^3 07/03/25 1052   Calculated Wound Volume (cm^3) 0.9 cm^3 07/03/25 1052   Change in Wound Size % -125 07/03/25 1052   Drainage Amount Small 07/03/25 1220   Drainage Description Serosanguineous 07/03/25 1220   Miranda-wound Assessment Pink;Bleeding 07/03/25 1220   Treatments Cleansed;Site care 07/03/25 1220   Dressing Dry dressing 07/07/25 2142   Wound packed? No 07/03/25 1052   Dressing Changed Changed 07/03/25 1220   Patient Tolerance Tolerated well 07/03/25 1052   Dressing Status Clean;Dry;Intact 07/07/25 2142       Wound 07/03/25 Tibial Left;Posterior (Active)   Wound Image   07/08/25 1052   Wound Description Epithelialization 07/08/25 1103   Non-staged Wound Description Partial thickness 07/03/25 1105   Wound Length (cm) 0 cm 07/08/25 1103   Wound Width  "(cm) 0 cm 07/08/25 1103   Wound Depth (cm) 0 cm 07/08/25 1103   Wound Surface Area (cm^2) 0 cm^2 07/08/25 1103   Wound Volume (cm^3) 0 cm^3 07/08/25 1103   Calculated Wound Volume (cm^3) 0 cm^3 07/08/25 1103   Change in Wound Size % 100 07/08/25 1103   Drainage Amount None 07/08/25 1103   Miranda-wound Assessment Intact 07/08/25 1103   Treatments Cleansed;Site care 07/03/25 1105   Dressing Other (Comment) 07/05/25 1930   Wound packed? No 07/03/25 1105   Dressing Changed Changed 07/03/25 1105   Patient Tolerance Tolerated well 07/03/25 1105   Dressing Status Other (Comment) 07/08/25 1103       Wound 07/03/25 Heel Right (Active)   Wound Image   07/08/25 1049   Wound Description Epithelialization 07/08/25 1104   Non-staged Wound Description Not applicable 07/03/25 1108   Wound Length (cm) 0 cm 07/08/25 1104   Wound Width (cm) 0 cm 07/08/25 1104   Wound Depth (cm) 0 cm 07/08/25 1104   Wound Surface Area (cm^2) 0 cm^2 07/08/25 1104   Wound Volume (cm^3) 0 cm^3 07/08/25 1104   Calculated Wound Volume (cm^3) 0 cm^3 07/08/25 1104   Drainage Amount None 07/08/25 1104   Miranda-wound Assessment Intact 07/08/25 1104   Treatments Cleansed;Site care 07/03/25 1108   Dressing Foam, Silicon (eg. Allevyn, etc) 07/07/25 2142   Wound packed? No 07/03/25 1108   Dressing Changed Changed 07/03/25 1108   Patient Tolerance Tolerated well 07/03/25 1108   Dressing Status Other (Comment) 07/08/25 1104       Debridement   Wound 05/16/25 Surgical Foot Right     Date/Time: 7/8/2025 10:00 AM    Universal Protocol:  Consent: Verbal consent obtained  Risks and benefits: risks, benefits and alternatives were discussed  Consent given by: patient  Time out: Immediately prior to procedure a \"time out\" was called to verify the correct patient, procedure, equipment, support staff and site/side marked as required.  Patient understanding: patient states understanding of the procedure being performed  Patient consent: the patient's understanding of the procedure " matches consent given  Patient identity confirmed: verbally with patient    Debridement Details  Performed by: physician  Debridement type: surgical  Level of debridement: subcutaneous tissue  Pain control: none    Post-debridement measurements  Length (cm): 4.4  Width (cm): 0.3  Depth (cm): 1  Percent debrided: 100%  Surface Area (cm^2): 1.04  Area Debrided (cm^2): 1.04  Volume (cm^3): 0.69    Tissue and other material debrided: subcutaneous tissue  Devitalized tissue debrided: biofilm, necrotic debris and slough  Instrument(s) utilized: blade  Technique utilized: excisional  Bleeding: medium  Hemostasis obtained with: pressure  Procedural pain (0-10): insensate  Post-procedural pain: insensate   Response to treatment: procedure was tolerated well       Results from last 6 Months   Lab Units 06/23/25  2879   WOUND CULTURE  3 colonies Serratia marcescens*  3 colonies Pseudomonas aeruginosa*  2 colonies Staphylococcus aureus*  2 colonies  2+ Growth of Staphylococcus aureus*

## 2025-07-08 NOTE — PROGRESS NOTES
Progress Note - Gold Van 79 y.o. male MRN: [id number]    Unit/Bed#: [facility] 215-01 Encounter: [id number]        Subjective:   Patient seen and examined at bedside after reviewing the chart and discussing the case with the caring staff.      Patient examined at bedside.  Patient reportedly continues to show orthostatic hypotension which appears to be symptomatic.    Physical Exam   Vitals: Blood pressure 161/81, pulse 95, temperature 98.3 °F (36.8 °C), temperature source Temporal, resp. rate 18, height 6' (1.829 m), weight 71.6 kg (157 lb 13.6 oz), SpO2 99%.,Body mass index is 21.41 kg/m².  Constitutional: He appears well-developed.   HEENT: PERR, EOMI, MMM  Cardiovascular: Normal rate and regular rhythm.    Pulmonary/Chest: Effort normal and breath sounds normal.   Abdomen: Soft, + BS, NT    Assessment/Plan:  Gold Van is a(n) 79 y.o. 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 Galeas.      Cardiac with hx of HTN, HLD, PAF, CHF, cardiomyopathy/orthostatic hypotension.  Continue atenolol 25 mg daily, lisinopril 5 mg daily, atorvastatin 40 mg daily, Eliquis 5 mg twice daily.  Daily weights.  Patient will be put on abdominal binders along with JOSEFA stockings 7/8/2025.  I may consider putting the patient on low-dose midodrine.  T2DM.  Hgb A1c 6.6% on 6/18/25.  Home: Glucotrol XL 10 mg twice daily and metformin  mg daily.  Here: Lantus 12u nighty, Humalog 4u TID with meals, metformin  mg daily (restarted 7/6).  SSI with accuchecks ac/hs.  Carb controlled diet.    Hx CVA.  10/2024.  Continue Plavix, Eliquis and statin.  PAD.  S/p R CFA/SFA endarterectomy/stenting on 5/13/25.  Continue Plavix and statin.  GERD.  Patient is on famotidine 20 mg daily.    COPD/asthma.  Continue home Breztri (brought from home), Singulair 10 mg nightly, albuterol inhaler as needed.  CKD stage 2.  Stable.  Cr 0.77 on 7/4.  Peak Cr 1.56 -> 0.76 on 7/7/2025.  Avoid nephrotoxins.  Cont to  monitor.  Chronic generalized pruritus.  Continue prednisone 5 mg daily.    Chronic hyponatremia.  Level 132 -> 132 on 7/7/2025.  Asymptomatic.  Cont to trend.    Anemia.  Stable.  Hgb 9.2 -> 9.8 on 7/7/2025.  Cont to trend.   Cough.  Ongoing x weeks.  CXR  6/17 negative.  Start Mucinex 600 mg q12h, tessalon perles as needed 7/6.  Cont to monitor.   MSSA bacteremia.  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  Now s/p right TMA.  Patient is on IV cefepime 2g q8h for 6 weeks (thru 8/6/25).  RUE PICC placed 6/30 and to be removed by RN after final dose of antibiotics.  Weekly CBCD and creatinine while on IV abx.  Pain and bowel regimen.  As per physiatry.  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  OR cultures grew serratia, pseudomonas, MSSA.  Patient is on IV cefepime 2g q8h for 6 weeks (thru 8/6/25).  NWB until sutures are removed per podiatry.  He is receiving intensive PT OT with management as per physiatry.      Anticipated date of discharge.  TBD.

## 2025-07-08 NOTE — NURSING NOTE
[facility]  RN Shift Note        Vitals  Vital Signs  Temperature: 98.3 °F (36.8 °C)  Temp Source: Temporal  Pulse: 95  Heart Rate Source: Monitor  Respirations: 18  Blood Pressure: 93/52  MAP (mmHg): 85  BP Location: Left arm  BP Method: Automatic  Patient Position - Orthostatic VS: Lying        Abdominal binder given for low bp.   Bp meds given in AM  bp with in parameters    Pain Pain Score: 4  Hospital Pain Intervention(s): Declines    Reportted no pain    GI   (WNL/X)  LBM  Incontinence (yes/no)     Gastrointestinal (WDL): Exceptions to WDL  Last BM Date: 07/06/25  Bowel Incontinence: No          (WNL/X)  Incontinence (yes/no)  Bladder Scan  Urine Output  Shift Total Output  Unmeasured Occurrence   Genitourinary (WDL): Within Defined Limits  Urinary Incontinence: No    Urine: 500 mL    Unmeasured Urine Occurrence: 1 (Tthere was no hat in the bed side commode to measure void)      Uses urinal continent of urine    Nutrition  Diet/Precautions   PO Intake  Meal Consumption   Nutrition  Feeding: Able to feed self  Diet Type: Diabetic  Appetite: Fair  P.O.: 118 mL  Percent Meals Eaten (%): 25         LDA  Drain Output              Skin   (WNL/X)  Integrity  Pressure Injury      Integumentary (WDL): Exceptions to WDL  Skin Integrity: Excoriation, Tear, Redness, Bruising  Description     All wound care completed with wound nurse and wound center. New orders placed    Behavior/Mood  Behavior log  Patient Behaviors/Mood: Calm, Cooperative     Sleep Pattern  Sleep log         Education  Medication/Device        ADL/Mobility Summary  (transfer, bed mobility, ambulation)   Surgical on when ambulating. WB as tolerated

## 2025-07-08 NOTE — TEAM CONFERENCE
Acute RehabilitationTeam Conference Note  Date: 7/8/2025   Time: 11:00 AM       Patient Name:  Gold Van       Medical Record Number: [id number]   YOB: 1945  Sex: Male          Room/Bed:  [facility] 215/[facility] 215-01  Payor Info:  Payor: [facility] / Plan: [facility] REP / Product Type: Medicare PPO /      Admitting Diagnosis: Septic shock ([facility]) [A41.9, R65.21]   Admit Date/Time:  7/5/2025  2:30 PM  Admission Comments: No comment available     Primary Diagnosis:  S/P transmetatarsal amputation of foot, right ([facility])  Principal Problem: S/P transmetatarsal amputation of foot, right ([facility])    Patient Active Problem List    Diagnosis Date Noted    S/P transmetatarsal amputation of foot, right ([facility]) 07/05/2025    Acute pain 07/05/2025    At risk for venous thromboembolism (VTE) 07/05/2025    Pressure injury of right heel, stage 1 07/05/2025    Multiple open wounds of lower leg 07/05/2025    Dermatitis associated with moisture 07/05/2025    MSSA bacteremia 06/26/2025    Osteomyelitis of right foot ([facility]) 06/26/2025    Allergy to antibiotic 06/26/2025    Pressure injury of skin of sacral region 06/17/2025    Metabolic acidosis 06/17/2025    Ambulatory dysfunction 06/17/2025    Osteomyelitis ([facility]) 05/19/2025    Anemia 05/17/2025    Pulmonary hypertension ([facility]) 05/14/2025    Septic shock ([facility]) 05/07/2025    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 ([facility]) 04/29/2025    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 ([facility]) 04/28/2025    PAF (paroxysmal atrial fibrillation) ([facility]) 04/23/2025    Chronic heart failure with preserved ejection fraction (HFpEF) ([facility]) 04/23/2025    Ulcer of right foot with fat layer exposed secondary to osteomyelitis 03/25/2025    Severe peripheral arterial disease ([facility]) 11/26/2024    Severe protein-calorie malnutrition ([facility]) 10/09/2024    Gastroesophageal reflux disease without esophagitis 10/04/2024    Neuropathy  10/04/2024    Foot drop, left 10/04/2024    CVA (cerebrovascular accident) ([facility]) 10/02/2024    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[facility]) 04/29/2024    History of pneumothorax 04/28/2024    Non-recurrent bilateral inguinal hernia without obstruction or gangrene 03/18/2024    Pruritic condition 03/18/2024    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 06/16/2021    Hyponatremia 03/24/2021    Lung nodule 03/24/2021    Type 2 diabetes mellitus with complication, without long-term current use of insulin ([facility]) 10/23/2017    Essential hypertension 10/23/2017    Mild intermittent asthma without complication 10/23/2017    Mixed hyperlipidemia 10/23/2017       Physical Therapy:    Weight Bearing Status: Non-weight bearing (RLE)  Transfers:  (MOD A STS with RW; difficulty maintaining NWB RLE; min-mod A SBT surface to surface transfers)  Bed Mobility: Minimal Assistance  Amulation Distance (ft):  (TBA as able)  Ambulation:  (TBA as able)  Assistive Device for Ambulation:  (TBA as able)  Wheelchair Mobility Distance: 210 ft  Wheelchair Mobility: Minimal Assistance  Discharge Recommendations: Home with:  DC Home with:: Family Support, First Floor Setup, Home Physical Therapy    07/08/2025:  Patient recent admission to [facility] following hospitalization for TMA R foot and PMH of chronic R foot osteomyelitis s/p amp of 4th and 5th toes May 2025, L ICA aneurysm, CVA, HTN, HLD, severe PAD s/p R CFA/SFA endarterectomy, afib, COPD/asthma, GERD, T2DM(poorly controlled) .   Patient MIN A bed mobility, MOD A x 1 STS with walker, MIN-MOD A SBT surface to surfaces transfers, MIN A wheelchair mobility level and unlevel surfaces.  Gait and stairs not assessed.  Patient limited by decreased ROM/strength, decreased balance and safety, decreased endurance, NWB RLE, and pain.   Patient may benefit from continued inpatient [facility] PT to increase function, safety, and increased independence in prep for safe d/c to plan with continued PT services as  needed.    Occupational Therapy:  Eating: Supervision  Grooming: Supervision  Bathing: Minimal Assistance  Bathing: Minimal Assistance  Upper Body Dressing: Minimal Assistance  Lower Body Dressing: Moderate Assistance, Maximum Assistance  Toileting: Total Assistance  Tub/Shower Transfer:  (TBA)  Toilet Transfer: Total Assistance  Cognition: Within Defined Limits  Orientation: Person, Place, Time, Situation  Discharge Recommendations: Home with:  DC Home with:: Family Support, First Floor Setup, Home Occupational Therapy       07/07/2025: Pt new admission to [facility]; current LOF listed above. Pt's barriers for safe d/c include weakness, decreased ROM, decreased strength, decreased balance, decreased tolerance, decreased safety awareness, increased pain, and orthopedic restrictions. Pt will participate in ADL training, therapeutic exercises and activities, fxl mobility/transfers, and activity tolerance in order to progress towards goals. Pt will benefit from skilled OT services to increase independence for daily tasks and prepare for safe d/c home.     Speech Therapy:           No notes on file    Nursing Notes:  Appetite: Fair  Diet Type: Diabetic                      Diet Patient/Family Education Complete: Yes    Type of Wound (LDA): Wound                    Type of Wound Patient/Family Education: No  Bladder: 1 - Total Assistance     Bladder Patient/Family Education: Yes  Bowel: 1 - Total Assistance     Bowel Patient/Family Education: Yes  Pain Location/Orientation: Location: Buttocks  Pain Score: 0                       Hospital Pain Intervention(s): Declines  Pain Patient/Family Education: Yes  Medication Management/Safety  Injectable: Insulin  Safe Administration: No  Reason for non-safe administration: just startee education yesterday  Medication Patient/Family Education Complete: Yes    7/8/25 Admit to [facility] on 7/5/25 s/p R foot TMA. Alert and oriented. Lungs clear/decreased on RA. Pt has moist cough. Scheduled  "mucinex and prn tesalon rick for cough. HR regular. Trace edema RLE. Pt has multiple scabbed/healing skin tears to bilateral arms and legs. Stage II sacrum receiving triad and allevyn. Sutures clean,dry and intact to R foot TMA incision. Incision dressed with adaptic and kerlix wrap daily. Pt is NWB to RLE. Pt is receiving long term IV abt via PICC to RUE. Pt is currently on ACHS accu checks with sliding scale and lantus insulin. Pt has not been on insulin prior to hospital stay. Pt has been experiencing dizzy spells when OOB during the day, dizziness resolves upon lying back down in bed. Continent of bowel and bladder. Pt has been free from falls while in [facility].     Case Management:     Discharge Planning  Living Arrangements: Lives w/ Spouse/significant other  Support Systems: Spouse/significant other  Assistance Needed: unknown  Type of Current Residence: Private residence  Current Home Care Services: No  07/08/2025:  Patient admitted to [facility] on 07[facility]05[facility]2025 after inpatient stay for TMA R foot.   Patient lives in 2 story house. Patient describes house as a high [place] that has a ground floor where there is a garage. On 1 side of garage is a shop and on the other side is a \"family room\" that has a Futon, recliner, and TV. Has room for hospital bed if needed and BSC. There are 13 steps ( 6 + landing + 7) with R HR to main floor where he has his bed room and full bath. Has shower with lip and grab bars (no seat). Sleeps in regular bed. Patient is retired. Was independent PTA using SPC. Patient drives. Has RW, SPC, and BSC at home. May be able to get wheelchair from [employer].  Has appt with podiatry 07/08/2025 at 10 AM.   Patient covered from 07/05/2025-07/08/2025 with LCD 07/08/2025.  Next review to be submitted to [employer] portal or faxed to [phone].  Will continue to follow for all D/C planning needs or concerns.     Is the patient actively participating in therapies? yes  List any modifications to the treatment " plan: na    Barriers Interventions   TMA right foot, MSSA bacteremia, HTN, DM, malnutrition, heart failure, COPD, orthostatic HTN IV antibiotics, medical management and oversight, medication management, monitor labs, reqs insulin- patient education, transition back to home meds   Chronic left foot drop Cues, task modification   Right heal pressure injury, DTI bilateral buttocks Local care   Decreased ROM and strength Therapeutic exercise, therapeutic activity   NWB right LE Cues, ADL, transfer, pre-gait training   Decreased balance and end Therapeutic exercise, therapeutic activity     Is the patient making expected progress toward goals? yes  List any update or changes to goals: na    Medical Goals: Patient will be able to manage medical conditions and comorbid conditions with medications and follow up upon completion of rehab program    Weekly Team Goals:   Rehab Team Goals  ADL Team Goal: Patient will require supervision with ADLs with least restrictive device upon completion of rehab program  Bowel/Bladder Team Goal: Patient will return to premorbid level for bladder/bowel management upon completion of rehab program  Transfer Team Goal: Patient will require supervision with transfers with least restrictive device upon completion of rehab program  Locomotion Team Goal: Patient will be independent with locomotion with least restrictive device upon completion of rehab program    S bed mobility, min assist transfers including sliding board transfer, wc mobility  S bathing, LB dressing    Health and wellness: to be cheryl to complete homemaking tasks    Discussion: Plan for return home with wife with [facility] for PT, OT, and nursing.  Patient is on a 900 min therapy program over 7 days for cardiac and BP issues    Anticipated Discharge Date:  July 22, 2025  [facility] Team Members Present:  The following team members are supervising care for this patient and were present during this Weekly Team Conference.    Physician: Dr. Snyder,  MD  Case Management: Rosalee Dugan, OTR/L  Nursing: Alice Ridley, RN and Debbi Campbell LPN  Physical Therapy: Lissa Velez PT  Occupational Therapy: Rosa Calderon, OTR/L

## 2025-07-08 NOTE — ASSESSMENT & PLAN NOTE
Wt Readings from Last 3 Encounters:   07/08/25 71.6 kg (157 lb 13.6 oz)   06/26/25 62.1 kg (137 lb)   06/10/25 67.1 kg (148 lb)   6/26 Echo with EF 50-55%, Diastolic function WNL  Follows with Dr. Nelson  Home: patient declined diuretic regimen, is on Lisinopril 5mg daily  > Here: Lisinopril 5mg daily  > Monitor volume status, lytes  > I/O, daily weights.  > Management as per IM

## 2025-07-08 NOTE — ASSESSMENT & PLAN NOTE
S/p R CFA/SFA endarterectomy w/ bovine pericardial patch, DCB and stenting of SFA, and angio of AT 5/13/25 ([place])   > Plavix, Statin, Eliquis  > Monitor neurovascular exam at least daily  > f/u Vascular surgery

## 2025-07-08 NOTE — ASSESSMENT & PLAN NOTE
Lab Results   Component Value Date    HGBA1C 6.6 (H) 06/18/2025          - Continue strict glycemic control, continue supplementation with Isaac

## 2025-07-08 NOTE — PCC PHYSICAL THERAPY
07/08/2025:  Patient recent admission to [facility] following hospitalization for TMA R foot and PMH of chronic R foot osteomyelitis s/p amp of 4th and 5th toes May 2025, L ICA aneurysm, CVA, HTN, HLD, severe PAD s/p R CFA/SFA endarterectomy, afib, COPD/asthma, GERD, T2DM(poorly controlled) .   Patient MIN A bed mobility, MOD A x 1 STS with walker, MIN-MOD A SBT surface to surfaces transfers, MIN A wheelchair mobility level and unlevel surfaces.  Gait and stairs not assessed.  Patient limited by decreased ROM/strength, decreased balance and safety, decreased endurance, NWB RLE, and pain.   Patient may benefit from continued inpatient [facility] PT to increase function, safety, and increased independence in prep for safe d/c to plan with continued PT services as needed.    07/15/2025:  Patient recent admission to [facility] following hospitalization for TMA R foot and PMH of chronic R foot osteomyelitis s/p amp of 4th and 5th toes May 2025, L ICA aneurysm, CVA, HTN, HLD, severe PAD s/p R CFA/SFA endarterectomy, afib, COPD/asthma, GERD, T2DM(poorly controlled) .   Patient S bed mobility, S transfers with RW, CGA / Cl S ambulation up to 150' on level and unlevel surfaces with RW, CGA 14  steps with B/L HR (with seated rest). Patient limited by orthostasis, decreased activity tolerance, decreased ROM/strength, decreased balance and safety, decreased endurance, R LE WBAT through heel with surgical shoe and pain.   Patient may benefit from continued inpatient [facility] PT to increase function, safety, and increased independence in prep for safe d/c to plan with continued PT services as needed.

## 2025-07-08 NOTE — PCC CARE MANAGEMENT
"07/08/2025:  Patient admitted to [facility] on 07/05/2025 after inpatient stay for TMA R foot.   Patient lives in 2 story house. Patient describes house as a high [place] that has a ground floor where there is a garage. On 1 side of garage is a shop and on the other side is a \"family room\" that has a Futon, recliner, and TV. Has room for hospital bed if needed and BSC. There are 13 steps ( 6 + landing + 7) with R HR to main floor where he has his bed room and full bath. Has shower with lip and grab bars (no seat). Sleeps in regular bed. Patient is retired. Was independent PTA using SPC. Patient drives. Has RW, SPC, and BSC at home. May be able to get wheelchair from [employer].  Has appt with podiatry 07/08/2025 at 10 AM.   Patient covered from 07/05/2025-07/08/2025 with LCD 07/08/2025.  Next review to be submitted to [employer] portal or faxed to [phone].  Will continue to follow for all D/C planning needs or concerns.     7/14/25:  Met with patient and spouse to give update on upcoming physician appointments. Cardiology appointment for today is rescheduled until after DC. Patient will attend weekly wound care appointment this week. Medical team will make determination on ID appointment closer to DC. Provided support for patient and wife and answered questions regarding DC. Insurance update due tomorrow for concurrent review. Will fax updated information for request for additional days. Plan to continue to follow for DC needs/concerns. Patient progressing with therapy and team recommending DC on July 22 with [facility] for PT, OT, and RN.    "

## 2025-07-09 PROBLEM — I95.1 ORTHOSTATIC HYPOTENSION: Status: ACTIVE | Noted: 2025-07-09

## 2025-07-09 NOTE — PROGRESS NOTES
Progress Note - PMR   Name: Gold Van 79 y.o. 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  S/p TMA on 6/23 by Dr. Galeas  Unclear if source control  OR Cultures: Serratia, MSSA, Pseudomonas  Blood cultures 6/26 with NGTD  > Per ID continue Cefepime through 8/6 for 6 weeks treatment  > PICC placed on 6/30  > Monitor labs at least weekly for toxicities  > Pain management  > Close incisional monitoring and care.  > NWB RLE  > PT/OT 3-5 hours/day, 5-7 days/week  > f/u Podiatry ~ 7/14 for 2 week f/u   Patient f/u w/ Dr. Galeas in 7/8: sutures removed; will need weekly debridement; full heel weight bearing and avoid heel - to toe gait in surgical shoe   > outpatient f/u with ID   - 7/22 Home PT/OT/RN  Foot drop, left  L ankle weakness in both PF/DF/EHL  Unclear if related to previous CVA in 2024 (CVA in setting of COPD exacerbation, but presented like impaired coordination)  Also with peripheral neuropathy  Denies back/radicular pain.  Was present in last [facility] admission in 2024.   Previously attempted to get AFO, but he declined it due to copay.   > Outpatient f/u with podiatry  CVA (cerebrovascular accident) ([facility])  10/2024  Presented with: loss of coordination in the setting of a COPD exacerbation. Imaging with significant and severe intracranial stenosis and atherosclerotic diseases as well as multiple foci of acute infarcts involving bilateral frontoparietal cortices.   Felt to be 2/2 hypoperfusion with question of hypercoag related to COVID>  Was on DAPT for 90 days, followed by ASA monotherapy  Also found to have Afib.   > Continue Plavix, Statin, Eliquis   Neuropathy  Likely 2/2 T2DM   Has some stocking dependent sensation impairment  May impact  balance  No associated pain  >Close monitoring of skin/incision.   Pressure injury of skin of sacral region  POA   >Frequent off loading  Turn patient Q2hr in bed  Specialty cushion when OOB   Local care as per wound care   Consulted nutrition/wound care for continuity  Outpatient f/u with wound care clinic   Pressure injury of right heel, stage 1  POA to [facility]  > Elevate heels of bed  > Allevyn daily and monitoring Qshift  > Outpatient f/u with Podiatry   Acute pain  Minimal  Mostly in sacrum  > Tylenol PRN  > Oxycodone 2.5-5mg Q4hr PRN  > Monitor and wean as tolerated   Multiple open wounds of lower leg  Dermatitis associated with moisture  POA to [facility]  > Continue local wound care to pretibial wounds  >Skin care 7/8  1-Hydraguard to Left heels 2 times per day and as needed    2-EHOB/WAFFLE or Roho  cushion when out of bed in chair.  3-Moisturize skin daily with skin nourishing cream  4-Elevate heels to offload pressure. EHOB off loading boots when in bed   5-Turn/reposition every 2 hours to offload and prevent pressure   6. Groin - cleanse with soap and water then pat dry Apply delfina antifungal cream 2 times per day and as needed   7. Apply silicone foam to the right heel ramone with a P an ddate peel and check skin integrity every shift change every 3 days   8. Cleanse sacrum, buttocks with soap and water apply triad paste to the wound on the right buttocks only then a silicone foam ramone with a T and date check skin integrity every shift and change every other day or when soiled   9. Left arm skin tear - cleanse with Normal saline then apply dermagram doubled then a silicone foam ramone with a T and date change every other day   10. Left tibial wound - cleanse with Normal saline then apply dermagran then top with a silicone foam ramone with a T and date change every other day   11. P 500 low air loss mattress   12. Dr. 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   Severe protein-calorie malnutrition (HCC)  Malnutrition  Findings:   Adult Malnutrition type: Chronic illness  Adult Degree of Malnutrition: Other severe protein calorie malnutrition  Malnutrition Characteristics: Inadequate energy, Weight loss                  360 Statement: Severe protein/calorie malnutrition r/t inadequate intake as evidenced by 12.8% unplanned wt loss since 4/8/25, Consuming < 75% energy intake compared to estimated energy needs > 1 month, nonhealing wounds. Treated with oral diet + ONS of Isaac bid and Ensure Plus HP 1 x day    BMI Findings:           Body mass index is 20.63 kg/m².   > Nutrition consulted   >encourage PO intake  At risk for venous thromboembolism (VTE)  Fully anticoagulated on eliquis, SCDs, Ambulation   Chronic heart failure with preserved ejection fraction (HFpEF) ([facility])  Wt Readings from Last 3 Encounters:   07/09/25 69 kg (152 lb 1.9 oz)   06/26/25 62.1 kg (137 lb)   06/10/25 67.1 kg (148 lb)   6/26 Echo with EF 50-55%, Diastolic function WNL  Follows with Dr. Nelson  Home: patient declined diuretic regimen, is on Lisinopril 5mg daily  > Here: Lisinopril 5mg daily  > Monitor volume status, lytes  > I/O, daily weights.  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  Essential hypertension  Home: Atenolol 25mg daily, Lisinopril 5mg daily  > Here: Atenolol 25mg daily, Lisinopril 5mg daily  Gastroesophageal reflux disease without esophagitis  Home: Famotidine 20mg BID  Here: Famotidine daily  Hyponatremia  Chronically 130-132  Here: 132  Not receiving any treatment currently  Monitor with routine blood work   PAF (paroxysmal atrial fibrillation) ([facility])  Home: Eliquis mg BID, no rate/rhythm control  Here: Same.  Monitor and adjust as appropriate  Severe peripheral arterial disease ([facility])  S/p R CFA/SFA endarterectomy w/ bovine  pericardial patch, DCB and stenting of SFA, and angio of AT 5/13/25 ([facility])   > Plavix, Statin, Eliquis  > Monitor neurovascular exam at least daily  > f/u Vascular surgery   Type 2 diabetes mellitus with complication, without long-term current use of insulin (HCC)  Lab Results   Component Value Date    HGBA1C 6.6 (H) 06/18/2025       Recent Labs     07/08/25  1212 07/08/25  1601 07/08/25 2005 07/09/25  0747   POCGLU 194* 243* 238* 165*       Blood Sugar Average: Last 72 hrs:  (P) [id number]: Glipizide 10mg BID, Linagliptin 5mg daily (listed as not taking), Metformin 500mg at dinner   Here: Lantus 12 units QHS, Lispro 4 units TID with meals, CDI/Accuchecks.  Will need to transition to or optimize home meds, or else patient will need to diabetes kit/education.  Management as per IM.     Pruritic condition  At home on prednisone 5mg daily  Continued here, but at risk for poor blood sugar control and infection  Monitor for now.   Orthostatic hypotension  Significant orthostatic hypotension w/ dizziness  TEDS and binder when OOB   Will monitor and start midodrine if needed   Encourage PO inake     History of Present Illness   Gold Van is a 79 y.o. male who presented to the hospital with septic shock from wound care appointment. Podiatry and was consulted. MRI R foot concerning for OM of 4th metatarsal. He underwent TMA R foot w/ Dr. Galeas on 6/23. Wound cultures w/ pseudomonas and serratia as well as MSSA. Also notable for 1:2 Bcx of MSSA. TTE w/o obvious vegetation. PICC placed 6/30.     Chief Complaint: f/u ambulatory dysfunction    Interval: Patient seen and examined in bed. No events overnight.  Reports overall feeling well. Last BM 7/8.  Excited to be able to weight bear.       Objective   Functional Update:  Physical Therapy Occupational Therapy Speech Therapy   Weight Bearing Status: Non-weight bearing (RLE)  Transfers:  (MOD A STS with RW; difficulty maintaining NWB RLE; min-mod A SBT  surface to surface transfers)  Bed Mobility: Minimal Assistance  Amulation Distance (ft):  (TBA as able)  Ambulation:  (TBA as able)  Assistive Device for Ambulation:  (TBA as able)  Wheelchair Mobility Distance: 210 ft  Wheelchair Mobility: Minimal Assistance  Discharge Recommendations: Home with:  DC Home with:: Family Support, First Floor Setup, Home Physical Therapy   Eating: Supervision  Grooming: Supervision  Bathing: Minimal Assistance  Bathing: Minimal Assistance  Upper Body Dressing: Minimal Assistance  Lower Body Dressing: Moderate Assistance, Maximum Assistance  Toileting: Total Assistance  Tub/Shower Transfer:  (TBA)  Toilet Transfer: Total Assistance  Cognition: Within Defined Limits  Orientation: Person, Place, Time, Situation                   Temp:  [97.7 °F (36.5 °C)-98.4 °F (36.9 °C)] 98.3 °F (36.8 °C)  HR:  [] 90  Resp:  [18-20] 18  BP: ()/(52-65) 150/65  SpO2:  [99 %-100 %] 99 %    Physical Exam    Gen: No acute distress,   HEENT: Moist mucus membranes,   Cardiovascular: No edema  Pulmonary: Non-labored breathing.   : No schmitt  GI:  non-distended.   MSK: R TMA   Integumentary: Skin is warm, dry. .  Neuro:  Speech is intact. Appropriate to questioning.  Psych: Normal mood and affect.     DVT PPX: eliquis      Scheduled Meds:  Current Facility-Administered Medications   Medication Dose Route Frequency Provider Last Rate    acetaminophen  650 mg Oral Q6H PRN Annie L Dagnall, PA-C      albuterol  2 puff Inhalation Q4H PRN Annie L Dagnall, PA-C      apixaban  5 mg Oral BID Annie L Dagnall, PA-C      atenolol  25 mg Oral Daily Annie L Dagnall, PA-C      atorvastatin  40 mg Oral QPM Annie L Dagnall, PA-C      benzonatate  200 mg Oral TID PRN Annie L Dagnall, PA-C      Budeson-Glycopyrrol-Formoterol  2 puff Inhalation Q12H [facility] Ilana Payne PA-C      cefepime  2,000 mg Intravenous Q8H Annie L Dagnall, PA-C 2,000 mg (07/09/25 0537)    clopidogrel  75 mg Oral Daily  Annie BROWN Dagnall, PA-C      docusate sodium  100 mg Oral Daily Ashley Depadua, MD      famotidine  20 mg Oral Daily Annie L Dagnall, PA-C      guaiFENesin  600 mg Oral Q12H SANDRA Annie L Dagnall, PA-C      insulin glargine  12 Units Subcutaneous HS Annie L Dagnall, PA-C      insulin lispro  1-5 Units Subcutaneous 4x Daily (with meals and at bedtime) Annie L Dagnall, PA-C      insulin lispro  4 Units Subcutaneous TID With Meals Anine L Dagnall, PA-C      lisinopril  5 mg Oral Daily Annie L Dagnall, PA-C      metFORMIN  500 mg Oral Daily With Dinner Annie L Dagnall, PA-C      CALEB ANTIFUNGAL   Topical BID Annie L Dagnall, PA-C      montelukast  10 mg Oral HS Annie L Dagnall, PA-C      multivitamin-minerals  1 tablet Oral Daily Annie L Dagnall, PA-C      ondansetron  4 mg Oral Q6H PRN Annie L Dagnall, PA-C      oxyCODONE  2.5 mg Oral Q4H PRN Annie L Dagnall, PA-C      Or    oxyCODONE  5 mg Oral Q4H PRN Annie L Dagnall, PA-C      polyethylene glycol  17 g Oral Daily PRN Annie L Dagnall, PA-C      predniSONE  5 mg Oral Daily Annie L Dagnall, PA-C      sodium chloride  2 spray Each Nare Q1H PRN Annie L Dagnall, PA-C           Lab Results: I have reviewed the following results:  Results from last 7 days   Lab Units 07/07/25  0526 07/04/25  0441   HEMOGLOBIN g/dL 9.8* 9.2*   HEMATOCRIT % 31.4* 29.2*   WBC Thousand/uL 6.75 5.51   PLATELETS Thousands/uL 281 316     Results from last 7 days   Lab Units 07/07/25  0526 07/04/25  0441   BUN mg/dL 30* 39*   SODIUM mmol/L 132* 132*   POTASSIUM mmol/L 4.1 4.1   CHLORIDE mmol/L 102 103   CREATININE mg/dL 0.76 0.77   AST U/L 17  --    ALT U/L 19  --               Julianna Snyder MD   Physical Medicine and Rehabilitation   07/09/25    I have spent a total time of 37 minutes in caring for this patient on the day of the visit/encounter including Counseling / Coordination of care, Documenting in the medical record, and Communicating with other healthcare  professionals .

## 2025-07-09 NOTE — ASSESSMENT & PLAN NOTE
Wt Readings from Last 3 Encounters:   07/09/25 69 kg (152 lb 1.9 oz)   06/26/25 62.1 kg (137 lb)   06/10/25 67.1 kg (148 lb)   6/26 Echo with EF 50-55%, Diastolic function WNL  Follows with Dr. Nelson  Home: patient declined diuretic regimen, is on Lisinopril 5mg daily  > Here: Lisinopril 5mg daily  > Monitor volume status, lytes  > I/O, daily weights.  > Management as per IM

## 2025-07-09 NOTE — NURSING NOTE
[facility]  RN Shift Note        Vitals  Vital Signs  Temperature: 98.4 °F (36.9 °C)  Temp Source: Temporal  Pulse: 74  Heart Rate Source: Monitor  Respirations: 18  Blood Pressure: 107/56  MAP (mmHg): 77  BP Location: Left arm  BP Method: Automatic  Patient Position - Orthostatic VS: Lying        Follow up/Interventions- monitor vitals   Pain Pain Score: 0  Hospital Pain Intervention(s): Declines    Follow up/Interventions- manage pain meds   GI   (WNL/X)  LBM  Incontinence (yes/no)     Gastrointestinal (WDL): Exceptions to WDL  Last BM Date: 07/08/25  Bowel Incontinence: No    Follow up/Interventions- bowel meds      (WNL/X)  Incontinence (yes/no)  Bladder Scan  Urine Output  Shift Total Output  Unmeasured Occurrence   Genitourinary (WDL): Within Defined Limits  Urinary Incontinence: No    Urine: 500 mL    Unmeasured Urine Occurrence: 1 (Tthere was no hat in the bed side commode to measure void)      Follow up/Interventions- uses urinal   Nutrition  Diet/Precautions   PO Intake  Meal Consumption   Nutrition  Feeding: Able to feed self  Diet Type: Diabetic  Appetite: Fair  P.O.: 118 mL  Percent Meals Eaten (%): 50      Strategies/Interventions-   Follow up-    LDA  Drain Output           Follow up/Interventions- PICC maintainence   Skin   (WNL/X)  Integrity  Pressure Injury YES/NO: no     Integumentary (WDL): Exceptions to WDL  Skin Integrity: Excoriation, Tear, Redness, Bruising  Description     Follow up/Interventions- foot incision.   Behavior/Mood  Behavior log YES/NO: no Patient Behaviors/Mood: Calm, Cooperative    Follow up/Interventions-    Sleep Pattern  Sleep log no        Education  Medication/Device        ADL/Mobility Summary  (transfer, bed mobility, ambulation)

## 2025-07-09 NOTE — ASSESSMENT & PLAN NOTE
Chronically 130-132  Here: 132  Not receiving any treatment currently  Monitor with routine blood work

## 2025-07-09 NOTE — ASSESSMENT & PLAN NOTE
POA to [facility]  > Continue local wound care to pretibial wounds  >Skin care 7/8  1-Hydraguard to Left heels 2 times per day and as needed    2-EHOB/WAFFLE or Roho  cushion when out of bed in chair.  3-Moisturize skin daily with skin nourishing cream  4-Elevate heels to offload pressure. EHOB off loading boots when in bed   5-Turn/reposition every 2 hours to offload and prevent pressure   6. Groin - cleanse with soap and water then pat dry Apply delfina antifungal cream 2 times per day and as needed   7. Apply silicone foam to the right heel ramone with a P an ddate peel and check skin integrity every shift change every 3 days   8. Cleanse sacrum, buttocks with soap and water apply triad paste to the wound on the right buttocks only then a silicone foam ramone with a T and date check skin integrity every shift and change every other day or when soiled   9. Left arm skin tear - cleanse with Normal saline then apply dermagram doubled then a silicone foam ramone with a T and date change every other day   10. Left tibial wound - cleanse with Normal saline then apply dermagran then top with a silicone foam ramone with a T and date change every other day   11. P 500 low air loss mattress   12. Dr. 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

## 2025-07-09 NOTE — ASSESSMENT & PLAN NOTE
Significant orthostatic hypotension w/ dizziness  TEDS and binder when OOB   Will monitor and start midodrine if needed   Encourage PO inake

## 2025-07-09 NOTE — PROGRESS NOTES
07/09/25 0830   Pain Assessment   Pain Assessment Tool 0-10   Pain Score 5   Pain Location/Orientation Orientation: Right;Location: Foot   Restrictions/Precautions   Precautions Bed/chair alarms;Fall Risk;Pain;Supervision on toilet/commode;Limb alert;Multiple lines  (Orthostatic hypotension)   RLE Weight Bearing Per Order WBAT  (Through heel with surgical shoe)   ROM Restrictions No   Eating   Type of Assistance Needed Independent   Eating CARE Score 6   Grooming   Able To Comb/Brush Hair;Wash/Dry Face;Wash/Dry Hands   Limitation Noted In Safety;Strength   Findings Set-up   Shower/Bathe Self   Type of Assistance Needed Incidental touching   Physical Assistance Level   (CGA when standing to wash perineal and buttocks)   Shower/Bathe Self CARE Score 4   Bathing   Assessed Bath Style Sponge Bath   Anticipated D/C Bath Style Shower;Sponge Bath   Able to Gather/Transport No   Able to Wash/Rinse/Dry (body part) Left Arm;Right Arm;L Upper Leg;R Upper Leg;L Lower Leg/Foot;R Lower Leg/Foot;Chest;Abdomen;Perineal Area;Buttocks  (R foot covered)   Limitations Noted in Balance;Endurance;ROM;Safety;Strength   Positioning Seated;Standing   Tub/Shower Transfer   Reason Not Assessed Sponge Bath   Upper Body Dressing   Type of Assistance Needed Physical assistance   Physical Assistance Level 25% or less   Comment Assist to remove shirt over head   Upper Body Dressing CARE Score 3   Lower Body Dressing   Type of Assistance Needed Physical assistance   Physical Assistance Level 51%-75%   Comment Assist to thread feet through undergarment and shorts; pt able to pull up over hips/buttocks himself   Lower Body Dressing CARE Score 2   Putting On/Taking Off Footwear   Type of Assistance Needed Physical assistance   Physical Assistance Level 26%-50%   Comment Assist to don sx shoe; pt able to doff/don sock on L foot   Putting On/Taking Off Footwear CARE Score 3   Dressing/Undressing Clothing   Remove UB Clothes Pullover Shirt   Don UB  Clothes Pullover Shirt   Remove LB Clothes Shorts;Undergarment;Socks   Don LB Clothes Shorts;Undergarment;Socks   Limitations Noted In Balance;Endurance;Safety;Strength;ROM   Positioning Supported Sit;Standing   Lying to Sitting on Side of Bed   Type of Assistance Needed Independent   Lying to Sitting on Side of Bed CARE Score 6   Sit to Stand   Type of Assistance Needed Incidental touching   Comment CGA with RW   Sit to Stand CARE Score 4   Bed-Chair Transfer   Type of Assistance Needed Incidental touching   Physical Assistance Level   (CGA)   Chair/Bed-to-Chair Transfer CARE Score 4   Transfer Bed/Chair/Wheelchair   Limitations Noted In Balance;Endurance;UE Strength;LE Strength   Exercise Tools   Other Exercise Tool [facility] 30 reps 4# weight in all planes of motion   Cognition   Overall Cognitive Status WFL   Arousal/Participation Alert;Responsive;Cooperative   Attention Within functional limits   Orientation Level Oriented X4   Memory Decreased recall of precautions   Following Commands Follows one step commands without difficulty   Vision   Vision Comments Pt reported impaired vision in R eye exacerbating dizziness and making it difficult to concentrate; pt reports diagnosis of macular degeneration and missing eye injection appts   Additional Activities   Additional Activities Other (Comment)   Additional Activities Comments W/c mobility to and from OT room using BUEs with supervision   Other Comments   Assessment Pt participated in 60 minutes concurrent tx with another pt with similar goals.   Assessment   Treatment Assessment Pt agreeable to OT session this AM; received lying supine in bed. ADLs, fxl transfers, and therex session completed; current LOF and details listed in respective sections. Pt overall independent eating; set-up gooming; CGA bathing and transfers; Virgie UB dressing; modmaxA LB dressing; min/modA donning/doffing footwear. Pt complained of fatigue and dizziness following each set of therex  and required breaks in between each set. Pt also complained that blurred vision in R eye exacerbated dizziness and caused him to become easily distracted. Pt's barriers impacting performance include decreased strength, decreased ROM, decreased endurance, impaired balance, decreased safety awareness, decreased skin integrity, and pain. Pt to benefit from continued skilled OT services to address listed barriers and prepare for safe d/c home.   Prognosis Good   Problem List Decreased strength;Decreased endurance;Impaired balance;Pain;Decreased range of motion;Decreased safety awareness;Decreased skin integrity   Plan   Treatment/Interventions ADL retraining;Functional transfer training;Therapeutic exercise;Endurance training;Patient/family training;Equipment eval/education;Compensatory technique education;OT   Progress Progressing toward goals   OT Therapy Minutes   OT Time In 0830   OT Time Out 1000   OT Total Time (minutes) 90   OT Mode of treatment - Individual (minutes) 30   OT Mode of treatment - Concurrent (minutes) 60   Therapy Time missed   Time missed? No

## 2025-07-09 NOTE — PROGRESS NOTES
Progress Note - Gold Van 79 y.o. male MRN: [id number]    Unit/Bed#: -01 Encounter: [id number]        Subjective:   Patient seen and examined at bedside after reviewing the chart and discussing the case with the caring staff.      Patient examined at bedside.  Patient still feeling dizzy at times when out of bed.  He is wearing abdominal binder and compression stocking.  Otherwise denies any acute symptoms.     Physical Exam   Vitals: Blood pressure 110/57, pulse 99, temperature 98 °F (36.7 °C), temperature source Temporal, resp. rate 18, height 6' (1.829 m), weight 69 kg (152 lb 1.9 oz), SpO2 100%.,Body mass index is 20.63 kg/m².  Constitutional: Patient in no acute distress.  HEENT: PERR, EOMI, MMM.  Cardiovascular: Normal rate and regular rhythm.    Pulmonary/Chest: Effort normal and breath sounds normal.   Abdomen: Soft, + BS, NT.    Assessment/Plan:  Gold Van is a(n) 79 y.o. 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 Galeas.      Cardiac with hx of HTN, HLD, PAF, CHF, cardiomyopathy, orthostatic hypotension.  Continue atenolol 25 mg daily, lisinopril 5 mg daily, atorvastatin 40 mg daily, Eliquis 5 mg twice daily.  Daily weights.  Apply abd binder and JOSEFA stockings.  T2DM.  Hgb A1c 6.6% on 6/18/25.  Home: Glucotrol XL 10 mg twice daily and metformin  mg daily.  Here: Lantus 12u nighty, Humalog 4u TID with meals, metformin  mg daily (restarted 7/6).  SSI with accuchecks ac/hs.  Carb controlled diet.    Hx CVA.  10/2024.  Continue Plavix, Eliquis and statin.  PAD.  S/p R CFA/SFA endarterectomy/stenting on 5/13/25.  Continue Plavix and statin.  GERD.  Patient is on famotidine 20 mg daily.    COPD/asthma.  Continue home Breztri (brought from home), Singulair 10 mg nightly, albuterol inhaler as needed.  CKD stage 2.  Stable.  Cr 0.77 -> 0.76 on 7/7.  Peak Cr 1.56.  Avoid nephrotoxins.  Cont to monitor.  Chronic generalized pruritus.  Continue prednisone 5  mg daily.    Chronic hyponatremia.  Level 132 -> 132 on 7/7/25.  Asymptomatic.  Cont to trend.    Anemia.  Stable.  Hgb 9.2 -> 9.8 on 7/7/25.  Cont to trend.   Cough.  Ongoing x weeks.  CXR on 6/17 negative.  Start Mucinex 600 mg q12h, tessalon perles as needed 7/6.  Cont to monitor.   MSSA bacteremia.  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  Now s/p right TMA.  Patient is on IV cefepime 2g q8h for 6 weeks (thru 8/6/25).  RUE PICC placed 6/30 and to be removed by RN after final dose of antibiotics.  Weekly CBCD and creatinine while on IV abx.  Pain and bowel regimen.  As per physiatry.  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  OR cultures grew serratia, pseudomonas, MSSA.  Patient is on IV cefepime 2g q8h for 6 weeks (thru 8/6/25).  NWB until sutures are removed per podiatry.  He is receiving intensive PT OT with management as per physiatry.      Anticipated date of discharge.  Tuesday 7/22/25    The patient was discussed with Dr. Hendrickson and he is in agreement with the above note.

## 2025-07-09 NOTE — ASSESSMENT & PLAN NOTE
2/2 nonhealing wound, presented in septic shock.  S/p TMA on 6/23 by Dr. Galeas  Unclear if source control  OR Cultures: Serratia, MSSA, Pseudomonas  Blood cultures 6/26 with NGTD  > Per ID continue Cefepime through 8/6 for 6 weeks treatment  > PICC placed on 6/30  > Monitor labs at least weekly for toxicities  > Pain management  > Close incisional monitoring and care.  > NWB RLE  > PT/OT 3-5 hours/day, 5-7 days/week  > f/u Podiatry ~ 7/14 for 2 week f/u   Patient f/u w/ Dr. Galeas in 7/8: sutures removed; will need weekly debridement; full heel weight bearing and avoid heel - to toe gait in surgical shoe   > outpatient f/u with ID   - 7/22 Home PT/OT/RN

## 2025-07-09 NOTE — PROGRESS NOTES
"   07/09/25 1230   Pain Assessment   Pain Assessment Tool 0-10   Pain Score 6   Pain Location/Orientation Location: Buttocks   Pain Onset/Description Onset: Ongoing   Effect of Pain on Daily Activities Decreased sitting tolerance   Patient's Stated Pain Goal No pain   Hospital Pain Intervention(s) Repositioned   Restrictions/Precautions   Precautions Bed/chair alarms;Fall Risk;Supervision on toilet/commode;Limb alert;Pain  (orthostatic hypotension, R UE PICC line)   RLE Weight Bearing Per Order WBAT  (Through heel with surgical shoe)   Cognition   Overall Cognitive Status WFL   Arousal/Participation Alert;Cooperative   Attention Within functional limits   Orientation Level Oriented X4   Memory Decreased recall of precautions   Following Commands Follows one step commands with increased time or repetition   Subjective   Subjective \"The doctor said that I can put weight on my foot as long as I am wearing the shoe.\"   Roll Left and Right   Type of Assistance Needed Independent   Physical Assistance Level No physical assistance   Comment With bedrail   Roll Left and Right CARE Score 6   Lying to Sitting on Side of Bed   Type of Assistance Needed Independent   Physical Assistance Level No physical assistance   Comment With bedrail   Lying to Sitting on Side of Bed CARE Score 6   Sit to Stand   Type of Assistance Needed Incidental touching;Verbal cues   Comment CGA with RW   Sit to Stand CARE Score 4   Bed-Chair Transfer   Type of Assistance Needed Incidental touching;Verbal cues   Comment CGA for bed to w/c with use of RW   Chair/Bed-to-Chair Transfer CARE Score 4   Transfer Bed/Chair/Wheelchair   Limitations Noted In Balance;Endurance;Pain Management;LE Strength   Adaptive Equipment Roller Walker   Stand Pivot Contact Guard   Sit to Stand Contact Guard   Stand to Sit Contact Guard   Supine to Sit Independent   Findings Due to hx of orthostatic hypotension, CGA provided for safety.   Car Transfer   Reason if not " Attempted Safety concerns   Car Transfer CARE Score 88   Walk 10 Feet   Type of Assistance Needed Physical assistance;Verbal cues   Physical Assistance Level 25% or less   Comment CGA to occasional min A with the RW   Walk 10 Feet CARE Score 3   Walk 50 Feet with Two Turns   Type of Assistance Needed Physical assistance;Verbal cues   Physical Assistance Level 25% or less   Comment CGA to occasional min A with the RW   Walk 50 Feet with Two Turns CARE Score 3   Walk 150 Feet   Reason if not Attempted Safety concerns   Walk 150 Feet CARE Score 88   Ambulation   Primary Mode of Locomotion Prior to Admission Walk   Distance Walked (feet) 80 ft  (100', 100')   Assist Device Roller Walker   Gait Pattern Slow Marsha   Limitations Noted In Balance;Device Management;Endurance;Speed;Strength;Safety   Provided Assistance with: Balance;Direction;Limb Advancement   Walk Assist Level Contact Guard;Minimum Assist   Findings CGA to occasional min A for gait with the RW.  Pt is able to be WBAT on R heel during gait with surgical shoe.  Pt educated on step to gait pattern to minimize WB on R forefoot.  Pt required frequent cues to improve consistent sequencing with fair carryover. Pt able to initaite step to gait pattern, but tends to progress to step through gait pattern with increased gait.   Does the patient walk? 2. Yes   Curb or Single Stair   Reason if not Attempted Safety concerns   1 Step (Curb) CARE Score 88   4 Steps   Reason if not Attempted Safety concerns   4 Steps CARE Score 88   12 Steps   Reason if not Attempted Safety concerns   12 Steps CARE Score 88   Stairs   Findings Pt did not feel up to trialing steps today.  Will attempt steps tomorrow.   Therapeutic Interventions   Strengthening Supine TE, seated TE   Balance gait, transfers   Other Frequent BP assessment   Assessment   Treatment Assessment Pt motivated to participate. Improved activity tolerance today.  Pt completed supine exercises.  Prior to OOB,  orthostatic BP assessed. In supine, .58, HR 76 bpm.  Sitting /51, HR 85 bpm. Standing BP 89/50.  After first gait trial, /59, HR 82 bpm.  After second gait trial, /56, HR 83 bpm, O2 sats 98% on room air. After last gait trial, BP 94/53, HR 84 bpm. Pt able to complete 3 gait trials today with the RW with min to CGA.  Pt is now WBAT on R heel with surgical shoe. Pt given cues for sequencing and step to gait pattern in order to maintain heel WB. Pt with fair carryover.  Pt deferred steps due to fatigue.  He will benefit from continued PT to progress gait, transfers, strengthening, steps, balance, and safety in order to maximize function and decrease risk for falls.   Problem List Decreased strength;Decreased endurance;Decreased mobility;Impaired balance;Decreased safety awareness;Orthopedic restrictions   Barriers to Discharge Inaccessible home environment   PT Barriers   Physical Impairment Decreased strength;Decreased endurance;Decreased mobility;Impaired balance;Decreased safety awareness;Orthopedic restrictions   Functional Limitation Stair negotiation;Standing;Transfers;Walking   Plan   Treatment/Interventions Functional transfer training;Elevations;LE strengthening/ROM;Therapeutic exercise;Endurance training;Patient/family training;Equipment eval/education;Bed mobility;Gait training   Progress Progressing toward goals   PT Therapy Minutes   PT Time In 1230   PT Time Out 1403   PT Total Time (minutes) 93   PT Mode of treatment - Individual (minutes) 93   PT Mode of treatment - Concurrent (minutes) 0   PT Mode of treatment - Group (minutes) 0   PT Mode of treatment - Co-treat (minutes) 0   PT Mode of Treatment - Total time(minutes) 93 minutes   PT Cumulative Minutes 273   Therapy Time missed   Time missed? No     Supine TE:  B APs x 30  B heel slides 2 x 15  B hip abduction 2 x 15  B SAQs 2 x 15  B SLR 2 x 15    Seated TE:  B hip hikes 2 x 15  B LAQs 2 x 10

## 2025-07-09 NOTE — NURSING NOTE
[facility]  RN Shift Note        Vitals  Vital Signs  Temperature: 98 °F (36.7 °C)  Temp Source: Temporal  Pulse: 99  Heart Rate Source: Monitor  Respirations: 18  Blood Pressure: 110/57  MAP (mmHg): 77  BP Location: Right arm  BP Method: Automatic  Patient Position - Orthostatic VS: Sitting           Pain Pain Score: 6  Hospital Pain Intervention(s): Repositioned    Mild pain refused pain medication    GI   (WNL/X)  LBM  Incontinence (yes/no)     Gastrointestinal (WDL): Exceptions to WDL  Last BM Date: 07/08/25  Bowel Incontinence: No          (WNL/X)  Incontinence (yes/no)  Bladder Scan  Urine Output  Shift Total Output  Unmeasured Occurrence   Genitourinary (WDL): Within Defined Limits  Urinary Incontinence: No    Urine: 500 mL    Unmeasured Urine Occurrence: 1 (Tthere was no hat in the bed side commode to measure void)         Nutrition  Diet/Precautions   PO Intake  Meal Consumption   Nutrition  Feeding: Able to feed self  Diet Type: Diabetic  Appetite: Fair  P.O.: 118 mL  Percent Meals Eaten (%): 50         LDA  Drain Output              Skin   (WNL/X)  Integrity  Pressure Injury      Integumentary (WDL): Exceptions to WDL  Skin Integrity: Excoriation, Tear, Redness, Bruising  Description     Wound care due to be changed 7/10   Behavior/Mood  Behavior log  Patient Behaviors/Mood: Calm, Cooperative       Sleep Pattern  Sleep log         Education  Medication/Device        ADL/Mobility Summary  (transfer, bed mobility, ambulation)   Assist x 1 with roller walker

## 2025-07-09 NOTE — ASSESSMENT & PLAN NOTE
Lab Results   Component Value Date    HGBA1C 6.6 (H) 06/18/2025       Recent Labs     07/08/25  1212 07/08/25  1601 07/08/25 2005 07/09/25  0747   POCGLU 194* 243* 238* 165*       Blood Sugar Average: Last 72 hrs:  (P) [id number]: Glipizide 10mg BID, Linagliptin 5mg daily (listed as not taking), Metformin 500mg at dinner   Here: Lantus 12 units QHS, Lispro 4 units TID with meals, CDI/Accuchecks.  Will need to transition to or optimize home meds, or else patient will need to diabetes kit/education.  Management as per IM.

## 2025-07-09 NOTE — ASSESSMENT & PLAN NOTE
Malnutrition Findings:   Adult Malnutrition type: Chronic illness  Adult Degree of Malnutrition: Other severe protein calorie malnutrition  Malnutrition Characteristics: Inadequate energy, Weight loss                  360 Statement: Severe protein/calorie malnutrition r/t inadequate intake as evidenced by 12.8% unplanned wt loss since 4/8/25, Consuming < 75% energy intake compared to estimated energy needs > 1 month, nonhealing wounds. Treated with oral diet + ONS of Isaac bid and Ensure Plus HP 1 x day    BMI Findings:           Body mass index is 20.63 kg/m².   > Nutrition consulted   >encourage PO intake

## 2025-07-09 NOTE — ASSESSMENT & PLAN NOTE
S/p R CFA/SFA endarterectomy w/ bovine pericardial patch, DCB and stenting of SFA, and angio of AT 5/13/25 ([facility])   > Plavix, Statin, Eliquis  > Monitor neurovascular exam at least daily  > f/u Vascular surgery

## 2025-07-10 NOTE — PROGRESS NOTES
"   07/10/25 1031   Pain Assessment   Pain Assessment Tool 0-10   Pain Score 5   Pain Location/Orientation Location: Buttocks   Pain Onset/Description Onset: Ongoing;Frequency: Intermittent;Descriptor: Aching   Effect of Pain on Daily Activities Decreased sitting tolerance   Patient's Stated Pain Goal No pain   Hospital Pain Intervention(s) Repositioned   Restrictions/Precautions   Precautions Bed/chair alarms;Fall Risk;Limb alert;Supervision on toilet/commode  (R UE limb alert due to PICC line)   RLE Weight Bearing Per Order WBAT  (Through heel with surgical shoe)   Cognition   Overall Cognitive Status WFL   Arousal/Participation Alert;Cooperative   Attention Within functional limits   Orientation Level Oriented X4   Memory Decreased recall of precautions   Following Commands Follows one step commands without difficulty   Subjective   Subjective \"My blood pressure dropped again this morning.\"   Roll Left and Right   Type of Assistance Needed Independent   Physical Assistance Level No physical assistance   Comment With bedrail   Roll Left and Right CARE Score 6   Lying to Sitting on Side of Bed   Type of Assistance Needed Independent   Physical Assistance Level No physical assistance   Comment With bedrail   Lying to Sitting on Side of Bed CARE Score 6   Sit to Stand   Type of Assistance Needed Incidental touching;Verbal cues   Physical Assistance Level 25% or less   Comment min to CGA with RW.  Pt primarily required CGA, but min A required for initial sit to stand due to posterior LOB.   Sit to Stand CARE Score 3   Bed-Chair Transfer   Type of Assistance Needed Incidental touching;Verbal cues   Physical Assistance Level No physical assistance   Comment CGA with RW from bed to w/c.   Chair/Bed-to-Chair Transfer CARE Score 4   Transfer Bed/Chair/Wheelchair   Limitations Noted In Balance;Endurance;UE Strength;LE Strength   Adaptive Equipment Roller Walker   Stand Pivot Contact Guard  (With RW)   Sit to Stand   (min to " CGA)   Stand to Sit Contact Guard   Supine to Sit Independent   Findings Pt required min A for initial sit to stand from EOB due to tendency for posterior LOB.  With increased repetition, pt required CGA for transfers. Intermittent cues needed for safe hand placement with transfers.   Car Transfer   Reason if not Attempted Safety concerns   Car Transfer CARE Score 88   Walk 10 Feet   Type of Assistance Needed Physical assistance;Verbal cues   Physical Assistance Level 25% or less   Comment CGA to occasional min A with the RW   Walk 10 Feet CARE Score 3   Walk 50 Feet with Two Turns   Type of Assistance Needed Physical assistance;Verbal cues   Physical Assistance Level 25% or less   Comment CGA to occasional min A   Walk 50 Feet with Two Turns CARE Score 3   Walk 150 Feet   Comment Limited by fatigue   Reason if not Attempted Safety concerns   Walk 150 Feet CARE Score 88   Walking 10 Feet on Uneven Surfaces   Reason if not Attempted Safety concerns   Walking 10 Feet on Uneven Surfaces CARE Score 88   Ambulation   Primary Mode of Locomotion Prior to Admission Walk   Distance Walked (feet) 52 ft  (120)   Assist Device Roller Walker   Gait Pattern Slow Marsha;Forward Flexion   Limitations Noted In Balance;Device Management;Endurance;Safety;Speed;Strength   Provided Assistance with: Balance;Direction   Walk Assist Level Minimum Assist;Contact Guard   Findings CGA to occasional min A for gait with the RW.  Pt cued for completion of step to gait pattern in order to maintain R heel WB restriction.  Cues needed for consistent completion.   Does the patient walk? 2. Yes   Curb or Single Stair   Style negotiated Single stair   Type of Assistance Needed Physical assistance;Verbal cues   Physical Assistance Level Total assistance   Comment 3 steps with 2 rails with min A x 2 to min A x 1, S x 1   1 Step (Curb) CARE Score 1   4 Steps   Reason if not Attempted Safety concerns   4 Steps CARE Score 88   12 Steps   Reason if not  Attempted Safety concerns   12 Steps CARE Score 88   Stairs   Type Stairs   # of Steps 3   Weight Bearing Precautions Fall Risk   Assist Devices Bilateral Rail   Findings Cues needed for sequencing. Pt ascended 3 steps with 2 rails with min A x 1, supervision x 1.  Pt completed turn at top of the steps and descended steps with min A x 2.  Cues needed for sequencing.   Picking Up Object   Reason if not Attempted Safety concerns   Picking Up Object CARE Score 88   Therapeutic Interventions   Strengthening supine and seated exercises   Balance steps, gait, transfers   Other Frequent BP assessment due to hx of orthostatic hypotension   Assessment   Treatment Assessment Pt agreeable to therapy.  Continues to make progress with therapy, but limited by fatigue and reports of dizziness with increased activity.  Pt completed supine exercises prior to OOB.  Orthostatics assessed with supine /63, HR 84 bpm, sitting /57, HR 99 bpm, standing /58,  bpm.  After first gait trial of 52', /57,  bpm. After second gait trial of 120', /58, . After completion of 3 steps /55, .  At end of tx, /59,  bpm.  Pt reports that he notices decreased dizziness with cervical flexion.  With rest, dizziness recedes, but does not go away.  Pt made progress towards with increased gait distance and completion of steps. Pt requires frequent rest breaks after completion of mobility activities. Will continue to progress gait, transfers, balance, strengthening, steps, endurance, and safety in order to maximize function and decrease risk for falls.   Problem List Decreased strength;Decreased endurance;Impaired balance;Decreased mobility   PT Barriers   Physical Impairment Decreased strength;Decreased endurance;Impaired balance;Decreased mobility   Functional Limitation Stair negotiation;Standing;Transfers;Walking   Plan   Treatment/Interventions Functional transfer training;LE  strengthening/ROM;Elevations;Therapeutic exercise;Endurance training;Patient/family training;Bed mobility;Gait training   Progress Progressing toward goals   PT Therapy Minutes   PT Time In 1031   PT Time Out 1201   PT Total Time (minutes) 90   PT Mode of treatment - Individual (minutes) 90   PT Mode of treatment - Concurrent (minutes) 0   PT Mode of treatment - Group (minutes) 0   PT Mode of treatment - Co-treat (minutes) 0   PT Mode of Treatment - Total time(minutes) 90 minutes   PT Cumulative Minutes 363   Therapy Time missed   Time missed? No     Supine ex: 1# B LEs  B heel slides 2 x 15  B hip abduction 2 x 15  B SAQs 2 x 15  Bridges with LEs over bolster 2 x 15  SLR 2 x 15    Seated ex:  B hip hikes x 20, B LAQs x 20, B APs x 20, B hip abduction with green t-band 2 x 15

## 2025-07-10 NOTE — ASSESSMENT & PLAN NOTE
S/p R CFA/SFA endarterectomy w/ bovine pericardial patch, DCB and stenting of SFA, and angio of AT 5/13/25 ([facility])   > Plavix, Statin, Eliquis  > Monitor neurovascular exam at least daily  > f/u Vascular surgery

## 2025-07-10 NOTE — PROGRESS NOTES
07/10/25 1400   Pain Assessment   Pain Assessment Tool 0-10   Pain Score No Pain   Restrictions/Precautions   Precautions Bed/chair alarms;Fall Risk;Pain;Supervision on toilet/commode;Limb alert;Multiple lines   RLE Weight Bearing Per Order WBAT  (Through heel with surgical shoe)   ROM Restrictions No   Exercise Tools   Hand Gripper 2x15 bilat hands with 9lb digiflex   Other Exercise Tool 1 Bilat hands to tie/untie knots from yellow theratubing   Other Exercise Tool 2 2x15 flexbar bilat UEs pronation/supination and internal/external rotation   Cognition   Overall Cognitive Status WFL   Arousal/Participation Alert;Cooperative   Attention Within functional limits   Orientation Level Oriented X4   Memory Decreased recall of precautions   Following Commands Follows one step commands without difficulty   Assessment   Treatment Assessment Pt agreeable to brief OT session this PM; received lying supine in bed. Therex session completed; current LOF and details listed in respective sections. At start of session, pt complained of fatigue and dizziness. Pt stated that he would prefer to remain in bed for session and OTS brought therex to pt room to complete while remaining in bed. Pt tolerated remaining therex well with no complaints of fatigue or dizziness. Pt's barriers impacting performance include decreased strength, decreased ROM, decreased endurance, impaired balance, decreased safety awareness, decreased skin integrity, and pain. Pt to benefit from continued skilled OT services to address listed barriers and prepare for safe d/c home.   Prognosis Good   Problem List Decreased strength;Decreased endurance;Impaired balance;Pain;Decreased range of motion;Decreased safety awareness;Decreased skin integrity   Plan   Treatment/Interventions ADL retraining;Functional transfer training;Therapeutic exercise;Endurance training;Patient/family training;Equipment eval/education;Compensatory technique education;OT   Progress  Progressing toward goals   OT Therapy Minutes   OT Time In 1400   OT Time Out 1430   OT Total Time (minutes) 30   OT Mode of treatment - Individual (minutes) 30   OT Mode of treatment - Concurrent (minutes) 0   Therapy Time missed   Time missed? No

## 2025-07-10 NOTE — PROGRESS NOTES
Progress Note - Gold Van 79 y.o. male MRN: [id number]    Unit/Bed#: -01 Encounter: [id number]        Subjective:   Patient seen and examined at bedside after reviewing the chart and discussing the case with the caring staff.      Patient examined at bedside.  Patient had episode of dizziness this morning with therapy.  His BP was 99/53.  Symptoms improved after being transferred back to bed.  No other complaints at this time.     Physical Exam   Vitals: Blood pressure 99/53, pulse 85, temperature 97.9 °F (36.6 °C), temperature source Temporal, resp. rate 16, height 6' (1.829 m), weight 64.9 kg (143 lb 1.3 oz), SpO2 99%.,Body mass index is 19.4 kg/m².  Constitutional: Patient in no acute distress.  HEENT: PERR, EOMI, MMM.  Cardiovascular: Normal rate and regular rhythm.    Pulmonary/Chest: Effort normal and breath sounds normal.   Abdomen: Soft, + BS, NT.    Assessment/Plan:  Gold Van is a(n) 79 y.o. 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 Galeas.      Cardiac with hx of HTN, HLD, PAF, CHF, cardiomyopathy, orthostatic hypotension.  Continue atenolol 25 mg daily, lisinopril 5 mg daily (decr from 5mg on 7/10), atorvastatin 40 mg daily, Eliquis 5 mg twice daily, midodrine 2.5 mg twice daily (started 7/10).  Daily weights.  Apply abd binder and JOSEFA stockings.  T2DM.  Hgb A1c 6.6% on 6/18/25.  Home: Glucotrol XL 10 mg twice daily and metformin  mg daily.  Here: Lantus 12u nighty, Humalog 4u TID with meals, metformin  mg daily (restarted 7/6).  SSI with accuchecks ac/hs.  Carb controlled diet.    Hx CVA.  10/2024.  Continue Plavix, Eliquis and statin.  PAD.  S/p R CFA/SFA endarterectomy/stenting on 5/13/25.  Continue Plavix and statin.  GERD.  Patient is on famotidine 20 mg daily.    COPD/asthma.  Continue home Breztri (brought from home), Singulair 10 mg nightly, albuterol inhaler as needed.  CKD stage 2.  Stable.  Cr 0.77 -> 0.76 on 7/7.  Peak Cr 1.56.   Avoid nephrotoxins.  Cont to monitor.  Chronic generalized pruritus.  Continue prednisone 5 mg daily.    Chronic hyponatremia.  Level 132 -> 132 on 7/7/25.  Asymptomatic.  Cont to trend.    Anemia.  Stable.  Hgb 9.2 -> 9.8 on 7/7/25.  Cont to trend.   Cough.  Ongoing x weeks.  CXR on 6/17 negative.  Start Mucinex 600 mg q12h, tessalon perles as needed 7/6.  Cont to monitor.   MSSA bacteremia.  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  Now s/p right TMA.  Patient is on IV cefepime 2g q8h for 6 weeks (thru 8/6/25).  RUE PICC placed 6/30 and to be removed by RN after final dose of antibiotics.  Weekly CBCD and creatinine while on IV abx.  Pain and bowel regimen.  As per physiatry.  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  OR cultures grew serratia, pseudomonas, MSSA.  Patient is on IV cefepime 2g q8h for 6 weeks (thru 8/6/25).  Saw podiatry 7/8, sutures removed, full heel weight bearing with surgical shoe.  He is receiving intensive PT OT with management as per physiatry.      Anticipated date of discharge.  Tuesday 7/22/25    The patient was discussed with Dr. Hendrickson and he is in agreement with the above note.

## 2025-07-10 NOTE — NURSING NOTE
[facility]  RN Shift Note        Vitals  Vital Signs  Temperature: 98.4 °F (36.9 °C)  Temp Source: Temporal  Pulse: 72  Heart Rate Source: Monitor  Respirations: 18  Blood Pressure: 139/60  MAP (mmHg): 77  BP Location: Left arm  BP Method: Automatic  Patient Position - Orthostatic VS: Lying        Follow up/Interventions- Monitor vitals   Pain Pain Score: 0  Hospital Pain Intervention(s): Repositioned    Follow up/Interventions- No Pain this shift   GI   (WNL/X)  LBM  Incontinence (yes/no)     Gastrointestinal (WDL): Exceptions to WDL  Last BM Date: 07/08/25  Bowel Incontinence: No    Follow up/Interventions- No bm this shift      (WNL/X)  Incontinence (yes/no)  Bladder Scan  Urine Output  Shift Total Output  Unmeasured Occurrence   Genitourinary (WDL): Within Defined Limits  Urinary Incontinence: No    Urine: 600 mL    Unmeasured Urine Occurrence: 1 (Tthere was no hat in the bed side commode to measure void)      Follow up/Interventions- Urinal use   Nutrition  Diet/Precautions   PO Intake  Meal Consumption   Nutrition  Feeding: Able to feed self  Diet Type: Diabetic  Appetite: Fair  P.O.: 118 mL  Percent Meals Eaten (%): 50      Strategies/Interventions-   Follow up-    LDA  Drain Output           Follow up/Interventions- PICC Patent   Skin   (WNL/X)  Integrity  Pressure Injury YES/NO: no     Integumentary (WDL): Exceptions to WDL  Skin Integrity: Excoriation, Tear, Redness, Bruising  Description     Follow up/Interventions- R Foot Incision   Behavior/Mood  Behavior log YES/NO: no Patient Behaviors/Mood: Calm, Cooperative    Follow up/Interventions-    Sleep Pattern  Sleep log no        Education  Medication/Device        ADL/Mobility Summary  (transfer, bed mobility, ambulation)

## 2025-07-10 NOTE — PROGRESS NOTES
Progress Note - PMR   Name: Gold Van 79 y.o. 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  S/p TMA on 6/23 by Dr. Galeas  Unclear if source control  OR Cultures: Serratia, MSSA, Pseudomonas  Blood cultures 6/26 with NGTD  > Per ID continue Cefepime through 8/6 for 6 weeks treatment  > PICC placed on 6/30  > Monitor labs at least weekly for toxicities  > Pain management  > Close incisional monitoring and care.  > NWB RLE  > PT/OT 3-5 hours/day, 5-7 days/week  > f/u Podiatry ~ 7/14 for 2 week f/u   Patient f/u w/ Dr. Galeas in 7/8: sutures removed; will need weekly debridement; full heel weight bearing and avoid heel - to toe gait in surgical shoe   > outpatient f/u with ID   - 7/22 Home PT/OT/RN  Foot drop, left  L ankle weakness in both PF/DF/EHL  Unclear if related to previous CVA in 2024 (CVA in setting of COPD exacerbation, but presented like impaired coordination)  Also with peripheral neuropathy  Denies back/radicular pain.  Was present in last [facility] admission in 2024.   Previously attempted to get AFO, but he declined it due to copay.   > Outpatient f/u with podiatry  CVA (cerebrovascular accident) ([facility])  10/2024  Presented with: loss of coordination in the setting of a COPD exacerbation. Imaging with significant and severe intracranial stenosis and atherosclerotic diseases as well as multiple foci of acute infarcts involving bilateral frontoparietal cortices.   Felt to be 2/2 hypoperfusion with question of hypercoag related to COVID>  Was on DAPT for 90 days, followed by ASA monotherapy  Also found to have Afib.   > Continue Plavix, Statin, Eliquis   Neuropathy  Likely 2/2 T2DM   Has some stocking dependent sensation impairment  May impact  balance  No associated pain  >Close monitoring of skin/incision.   Pressure injury of skin of sacral region  POA   >Frequent off loading  Turn patient Q2hr in bed  Specialty cushion when OOB   Local care as per wound care   Consulted nutrition/wound care for continuity  Outpatient f/u with wound care clinic   Pressure injury of right heel, stage 1  POA to ARC  > Elevate heels of bed  > Allevyn daily and monitoring Qshift  > Outpatient f/u with Podiatry   Acute pain  Minimal  Mostly in sacrum  > Tylenol PRN  > Oxycodone 2.5-5mg Q4hr PRN  > Monitor and wean as tolerated   Multiple open wounds of lower leg  Dermatitis associated with moisture  POA to ARC  > Continue local wound care to pretibial wounds  >Skin care 7/8  1-Hydraguard to Left heels 2 times per day and as needed    2-EHOB/WAFFLE or Roho  cushion when out of bed in chair.  3-Moisturize skin daily with skin nourishing cream  4-Elevate heels to offload pressure. EHOB off loading boots when in bed   5-Turn/reposition every 2 hours to offload and prevent pressure   6. Groin - cleanse with soap and water then pat dry Apply delfina antifungal cream 2 times per day and as needed   7. Apply silicone foam to the right heel ramone with a P an ddate peel and check skin integrity every shift change every 3 days   8. Cleanse sacrum, buttocks with soap and water apply triad paste to the wound on the right buttocks only then a silicone foam ramone with a T and date check skin integrity every shift and change every other day or when soiled   9. Left arm skin tear - cleanse with Normal saline then apply dermagram doubled then a silicone foam ramone with a T and date change every other day   10. Left tibial wound - cleanse with Normal saline then apply dermagran then top with a silicone foam ramone with a T and date change every other day   11. P 500 low air loss mattress   12. Dr. 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   Severe protein-calorie malnutrition (HCC)  Malnutrition  Findings:   Adult Malnutrition type: Chronic illness  Adult Degree of Malnutrition: Other severe protein calorie malnutrition  Malnutrition Characteristics: Inadequate energy, Weight loss                  360 Statement: Severe protein/calorie malnutrition r/t inadequate intake as evidenced by 12.8% unplanned wt loss since 4/8/25, Consuming < 75% energy intake compared to estimated energy needs > 1 month, nonhealing wounds. Treated with oral diet + ONS of Isaac bid and Ensure Plus HP 1 x day    BMI Findings:           Body mass index is 19.4 kg/m².   > Nutrition consulted   >encourage PO intake  At risk for venous thromboembolism (VTE)  Fully anticoagulated on eliquis, SCDs, Ambulation   Chronic heart failure with preserved ejection fraction (HFpEF) ([facility])  Wt Readings from Last 3 Encounters:   07/10/25 64.9 kg (143 lb 1.3 oz)   06/26/25 62.1 kg (137 lb)   06/10/25 67.1 kg (148 lb)   6/26 Echo with EF 50-55%, Diastolic function WNL  Follows with Dr. Nelson  Home: patient declined diuretic regimen, is on Lisinopril 5mg daily  > Here: Lisinopril 5mg daily  > Monitor volume status, lytes  > I/O, daily weights.  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  Essential hypertension  Home: Atenolol 25mg daily, Lisinopril 5mg daily  > Here: Atenolol 25mg daily, Lisinopril 5mg daily  Gastroesophageal reflux disease without esophagitis  Home: Famotidine 20mg BID  Here: Famotidine daily  Hyponatremia  Chronically 130-132  Here: 132  Not receiving any treatment currently  Monitor with routine blood work   PAF (paroxysmal atrial fibrillation) ([facility])  Home: Eliquis mg BID, no rate/rhythm control  Here: Same.  Monitor and adjust as appropriate  Severe peripheral arterial disease ([facility])  S/p R CFA/SFA endarterectomy w/ bovine  pericardial patch, DCB and stenting of SFA, and angio of AT 5/13/25 ([facility])   > Plavix, Statin, Eliquis  > Monitor neurovascular exam at least daily  > f/u Vascular surgery   Type 2 diabetes mellitus with complication, without long-term current use of insulin (HCC)  Lab Results   Component Value Date    HGBA1C 6.6 (H) 06/18/2025       Recent Labs     07/09/25  1150 07/09/25  1629 07/09/25  2045 07/10/25  0731   POCGLU 204* 182* 257* 101       Blood Sugar Average: Last 72 hrs:  (P) [id number]: Glipizide 10mg BID, Linagliptin 5mg daily (listed as not taking), Metformin 500mg at dinner   Here: Lantus 12 units QHS, Lispro 4 units TID with meals, CDI/Accuchecks.  Will need to transition to or optimize home meds, or else patient will need to diabetes kit/education.  Management as per IM.     Pruritic condition  At home on prednisone 5mg daily  Continued here, but at risk for poor blood sugar control and infection  Monitor for now.   Orthostatic hypotension  Significant orthostatic hypotension w/ dizziness  TEDS and binder when OOB   Midodrine 2.5 BID started 7/10  Encourage PO inake     History of Present Illness   Gold Van is a 79 y.o. male who presented to the hospital with septic shock from wound care appointment. Podiatry and was consulted. MRI R foot concerning for OM of 4th metatarsal. He underwent TMA R foot w/ Dr. Galeas on 6/23. Wound cultures w/ pseudomonas and serratia as well as MSSA. Also notable for 1:2 Bcx of MSSA. TTE w/o obvious vegetation. PICC placed 6/30.     Chief Complaint: f/u ambulatory dysfunction    Interval: Patient seen and examined in bed. No events overnight. Episode of orthostatic hypotension today w/ therapy with dizziness. Last BM 7/8.       Objective   Functional Update:  Physical Therapy Occupational Therapy Speech Therapy   Weight Bearing Status: Non-weight bearing (RLE)  Transfers:  (MOD A STS with RW; difficulty maintaining NWB RLE; min-mod A SBT surface to  surface transfers)  Bed Mobility: Minimal Assistance  Amulation Distance (ft):  (TBA as able)  Ambulation:  (TBA as able)  Assistive Device for Ambulation:  (TBA as able)  Wheelchair Mobility Distance: 210 ft  Wheelchair Mobility: Minimal Assistance  Discharge Recommendations: Home with:  DC Home with:: Family Support, First Floor Setup, Home Physical Therapy   Eating: Supervision  Grooming: Supervision  Bathing: Minimal Assistance  Bathing: Minimal Assistance  Upper Body Dressing: Minimal Assistance  Lower Body Dressing: Moderate Assistance, Maximum Assistance  Toileting: Total Assistance  Tub/Shower Transfer:  (TBA)  Toilet Transfer: Total Assistance  Cognition: Within Defined Limits  Orientation: Person, Place, Time, Situation                   Temp:  [97.9 °F (36.6 °C)-98.4 °F (36.9 °C)] 97.9 °F (36.6 °C)  HR:  [72-99] 85  Resp:  [16-18] 16  BP: ()/(44-83) 99/53  SpO2:  [96 %-100 %] 99 %    Physical Exam    Gen: No acute distress,   HEENT: Moist mucus membranes, Normocephalic/Atraumatic  Cardiovascular: No edema  Pulmonary: Non-labored breathing.   : No schmitt  GI:  non-distended.   MSK: R TMA   Integumentary: Skin is warm, dry. .  Neuro: Speech is intact. Appropriate to questioning.  Psych: Normal mood and affect.     DVT PPX: eliquis     Scheduled Meds:  Current Facility-Administered Medications   Medication Dose Route Frequency Provider Last Rate    acetaminophen  650 mg Oral Q6H PRN Annie L Dagnall, PA-C      albuterol  2 puff Inhalation Q4H PRN Annie L Dagnall, PA-C      apixaban  5 mg Oral BID Annie L Dagnall, PA-C      atenolol  25 mg Oral Daily Annie L Dagnall, PA-C      atorvastatin  40 mg Oral QPM Annie L Dagnall, PA-C      benzonatate  200 mg Oral TID PRN Annie L Dagnall, PA-C      Budeson-Glycopyrrol-Formoterol  2 puff Inhalation Q12H [facility] Ilana Payne PA-C      cefepime  2,000 mg Intravenous Q8H Annie L Felecial, PA-C Stopped (07/10/25 0600)    clopidogrel  75 mg Oral  Daily Annie L Felecial, PA-C      docusate sodium  100 mg Oral Daily Ashley Depadua, MD      famotidine  20 mg Oral Daily Annie L Dagnall, PA-C      guaiFENesin  600 mg Oral Q12H SANDRA Annie L Dagnall, PA-C      insulin glargine  12 Units Subcutaneous HS Annie L Dagnall, PA-C      insulin lispro  1-5 Units Subcutaneous 4x Daily (with meals and at bedtime) Annie L Felecial, PA-C      insulin lispro  4 Units Subcutaneous TID With Meals Annie L Felecial, PA-C      [START ON 7/11/2025] lisinopril  2.5 mg Oral Daily Annie L Felecial, PA-C      metFORMIN  500 mg Oral Daily With Dinner Annie L Felecial, PA-C      midodrine  2.5 mg Oral BID AC Julianna Snyder MD      CALEB ANTIFUNGAL   Topical BID Annie L Chelseynall, PA-C      montelukast  10 mg Oral HS Annie L Chelseynall, PA-C      multivitamin-minerals  1 tablet Oral Daily Annie L Dagnall, PA-C      ondansetron  4 mg Oral Q6H PRN Annie L Dagnall, PA-C      oxyCODONE  2.5 mg Oral Q4H PRN Annie L Dagnall, PA-C      Or    oxyCODONE  5 mg Oral Q4H PRN Annie L Dagnall, PA-C      polyethylene glycol  17 g Oral Daily PRN Annie L Dagnall, PA-C      predniSONE  5 mg Oral Daily Annie L Dagnall, PA-C      sodium chloride  2 spray Each Nare Q1H PRN Annie L Dagnall, PA-C           Lab Results: I have reviewed the following results:  Results from last 7 days   Lab Units 07/07/25  0526 07/04/25  0441   HEMOGLOBIN g/dL 9.8* 9.2*   HEMATOCRIT % 31.4* 29.2*   WBC Thousand/uL 6.75 5.51   PLATELETS Thousands/uL 281 316     Results from last 7 days   Lab Units 07/07/25  0526 07/04/25  0441   BUN mg/dL 30* 39*   SODIUM mmol/L 132* 132*   POTASSIUM mmol/L 4.1 4.1   CHLORIDE mmol/L 102 103   CREATININE mg/dL 0.76 0.77   AST U/L 17  --    ALT U/L 19  --               Julianna Snyder MD   Physical Medicine and Rehabilitation   07/10/25    I have spent a total time of 36 minutes in caring for this patient on the day of the visit/encounter including Counseling / Coordination of  care, Documenting in the medical record, and Communicating with other healthcare professionals .

## 2025-07-10 NOTE — PROGRESS NOTES
07/10/25 0790   Pain Assessment   Pain Assessment Tool 0-10   Pain Score 5   Pain Location/Orientation Location: Buttocks   Restrictions/Precautions   Precautions Bed/chair alarms;Fall Risk;Pain;Supervision on toilet/commode;Limb alert;Multiple lines   RLE Weight Bearing Per Order WBAT  (Through heel with surgical shoe)   ROM Restrictions No   Eating   Type of Assistance Needed Set-up / clean-up   Eating CARE Score 5   Grooming   Able To Comb/Brush Hair;Wash/Dry Face;Wash/Dry Hands   Limitation Noted In Safety;Strength   Findings Set-up   Shower/Bathe Self   Type of Assistance Needed Supervision   Shower/Bathe Self CARE Score 4   Bathing   Assessed Bath Style Sponge Bath   Anticipated D/C Bath Style Shower;Sponge Bath   Able to Gather/Transport No   Able to Wash/Rinse/Dry (body part) Left Arm;Right Arm;L Upper Leg;R Upper Leg;L Lower Leg/Foot;Chest;Abdomen;Perineal Area;Buttocks  (R lower leg/foot covered)   Limitations Noted in Balance;Endurance;ROM;Safety;Strength   Positioning Supine   Tub/Shower Transfer   Reason Not Assessed Sponge Bath   Upper Body Dressing   Type of Assistance Needed Physical assistance   Physical Assistance Level 25% or less   Comment Assist to remove shirt over head   Upper Body Dressing CARE Score 3   Lower Body Dressing   Type of Assistance Needed Physical assistance   Physical Assistance Level 76% or more   Comment Pt only able to pull undergarment and shorts over hips/buttocks   Lower Body Dressing CARE Score 2   Putting On/Taking Off Footwear   Type of Assistance Needed Physical assistance   Physical Assistance Level Total assistance   Putting On/Taking Off Footwear CARE Score 1   Dressing/Undressing Clothing   Remove UB Clothes Pullover Shirt   Don UB Clothes Pullover Shirt   Remove LB Clothes Shorts;Undergarment;Socks   Don LB Clothes Shorts;Undergarment;Socks;TEDs   Limitations Noted In Balance;Endurance;Safety;Strength;ROM   Positioning In Bed;Supported Sit   Roll Left and Right    Type of Assistance Needed Independent   Comment Using bedrail   Roll Left and Right CARE Score 6   Lying to Sitting on Side of Bed   Type of Assistance Needed Independent   Lying to Sitting on Side of Bed CARE Score 6   Sit to Stand   Type of Assistance Needed Incidental touching   Comment CGA with RW   Sit to Stand CARE Score 4   Bed-Chair Transfer   Type of Assistance Needed Incidental touching   Comment CGA bed <> commode with RW   Chair/Bed-to-Chair Transfer CARE Score 4   Transfer Bed/Chair/Wheelchair   Limitations Noted In Balance;Endurance;UE Strength;LE Strength   Toileting Hygiene   Type of Assistance Needed Supervision   Toileting Hygiene CARE Score 4   Toileting   Able to Pull Clothing down yes, up no.   Manage Hygiene Bladder;Bowel   Limitations Noted In Balance;ROM;Safety;UE Strength;LE Strength   Toilet Transfer   Type of Assistance Needed Incidental touching   Comment CGA with RW   Toilet Transfer CARE Score 4   Toilet Transfer   Surface Assessed Standard Commode   Transfer Technique Stand Pivot   Limitations Noted In Balance;Endurance;ROM;Safety;UE Strength;LE Strength   Adaptive Equipment Walker   Cognition   Overall Cognitive Status WFL   Arousal/Participation Alert;Cooperative   Attention Within functional limits   Orientation Level Oriented X4   Memory Decreased recall of precautions   Following Commands Follows one step commands with increased time or repetition   Assessment   Treatment Assessment Pt agreeable to OT session this AM; received lying supine in bed. ADLs and fxl transfer session completed; current LOF and details listed in respective sections. Pt overall set-up eating and grooming; supervision bathing and toileting; Virgie UB dressing; maxA LB dressing; total assist donning/doffing footwear; CGA transfers. Pt R foot dressing falling off at beginning of session; RN notified and changed prior to starting ADL session. Pt extremely fatigued and c/o dizziness after using commode and  required rest breaks and increased time after toileting. BP checked after standing for wound care and read 99/53. Pt transferred back to bed at end of session due to dizziness and fatigue. Pt's barriers impacting performance include decreased strength, decreased ROM, decreased endurance, impaired balance, decreased safety awareness, decreased skin integrity, and pain. Pt to benefit from continued skilled OT services to address listed barriers and prepare for safe d/c home.   Prognosis Good   Problem List Decreased strength;Decreased endurance;Impaired balance;Pain;Decreased range of motion;Decreased safety awareness;Decreased skin integrity   Plan   Treatment/Interventions ADL retraining;Functional transfer training;Therapeutic exercise;Endurance training;Patient/family training;Equipment eval/education;Compensatory technique education;OT   Progress Progressing toward goals   OT Therapy Minutes   OT Time In 0730   OT Time Out 0846   OT Total Time (minutes) 76   OT Mode of treatment - Individual (minutes) 16   OT Mode of treatment - Concurrent (minutes) 60   Therapy Time missed   Time missed? No

## 2025-07-10 NOTE — NURSING NOTE
OT reported patient became lightheaded/dizzy while sitting oob in wc after ADL. BP 99/53. Assisted back into bed, placed abdominal binder. Scooter hose stocking placed on LLE and ace wrap on RLE. Medicine and PM +R notified. Holding am Atenolol per orders. Will follow.

## 2025-07-10 NOTE — PLAN OF CARE
Problem: PAIN - ADULT  Goal: Verbalizes/displays adequate comfort level or baseline comfort level  Description: Interventions:  - Encourage patient to monitor pain and request assistance  - Assess pain using appropriate pain scale  - Administer analgesics as ordered based on type and severity of pain and evaluate response  - Implement non-pharmacological measures as appropriate and evaluate response  - Consider cultural and social influences on pain and pain management  - Notify physician/advanced practitioner if interventions unsuccessful or patient reports new pain  - Educate patient/family on pain management process including their role and importance of  reporting pain   - Provide non-pharmacologic/complimentary pain relief interventions  Outcome: Progressing     Problem: INFECTION - ADULT  Goal: Absence or prevention of progression during hospitalization  Description: INTERVENTIONS:  - Assess and monitor for signs and symptoms of infection  - Monitor lab/diagnostic results  - Monitor all insertion sites, i.e. indwelling lines, tubes, and drains    - Pearl appropriate cooling/warming therapies per order  - Administer medications as ordered  - Instruct and encourage patient and family to use good hand hygiene technique  - Identify and instruct in appropriate isolation precautions for identified infection/condition  Outcome: Progressing     Problem: SAFETY ADULT  Goal: Patient will remain free of falls  Description: INTERVENTIONS:  - Educate patient/family on patient safety including physical limitations  - Instruct patient to call for assistance with activity   - Consider consulting OT/PT to assist with strengthening/mobility based on AM PAC & JH-HLM score  - Consult OT/PT to assist with strengthening/mobility   - Keep Call bell within reach  - Keep bed low and locked with side rails adjusted as appropriate  - Keep care items and personal belongings within reach  - Initiate and maintain comfort rounds  -  Make Fall Risk Sign visible to staff    - Apply yellow socks and bracelet for high fall risk patients  - Consider moving patient to room near nurses station  Outcome: Progressing     Problem: Nutrition/Hydration-ADULT  Goal: Nutrient/Hydration intake appropriate for improving, restoring or maintaining nutritional needs  Description: Monitor and assess patient's nutrition/hydration status for malnutrition. Collaborate with interdisciplinary team and initiate plan and interventions as ordered.  Monitor patient's weight and dietary intake as ordered or per policy. Utilize nutrition screening tool and intervene as necessary. Determine patient's food preferences and provide high-protein, high-caloric foods as appropriate.     INTERVENTIONS:  - Monitor oral intake, urinary output, labs, and treatment plans  - Assess nutrition and hydration status and recommend course of action  - Evaluate amount of meals eaten  - Assist patient with eating if necessary   - Allow adequate time for meals  - Recommend/ encourage appropriate diets, oral nutritional supplements, and vitamin/mineral supplements  - Order, calculate, and assess calorie counts as needed  - Recommend, monitor, and adjust tube feedings and TPN/PPN based on assessed needs  - Assess need for intravenous fluids  - Provide specific nutrition/hydration education as appropriate  - Include patient/family/caregiver in decisions related to nutrition  Outcome: Progressing

## 2025-07-10 NOTE — ASSESSMENT & PLAN NOTE
Wt Readings from Last 3 Encounters:   07/10/25 64.9 kg (143 lb 1.3 oz)   06/26/25 62.1 kg (137 lb)   06/10/25 67.1 kg (148 lb)   6/26 Echo with EF 50-55%, Diastolic function WNL  Follows with Dr. Nelson  Home: patient declined diuretic regimen, is on Lisinopril 5mg daily  > Here: Lisinopril 5mg daily  > Monitor volume status, lytes  > I/O, daily weights.  > Management as per IM

## 2025-07-10 NOTE — CASE MANAGEMENT
Phone call to [employer] for update on concurrent review previously faxed.  Spoke with Christianne, patient covered for additional days July 9 through July 15 with NRD on July 15.

## 2025-07-10 NOTE — ASSESSMENT & PLAN NOTE
Malnutrition Findings:   Adult Malnutrition type: Chronic illness  Adult Degree of Malnutrition: Other severe protein calorie malnutrition  Malnutrition Characteristics: Inadequate energy, Weight loss                  360 Statement: Severe protein/calorie malnutrition r/t inadequate intake as evidenced by 12.8% unplanned wt loss since 4/8/25, Consuming < 75% energy intake compared to estimated energy needs > 1 month, nonhealing wounds. Treated with oral diet + ONS of Isaac bid and Ensure Plus HP 1 x day    BMI Findings:           Body mass index is 19.4 kg/m².   > Nutrition consulted   >encourage PO intake

## 2025-07-10 NOTE — ASSESSMENT & PLAN NOTE
Significant orthostatic hypotension w/ dizziness  TEDS and binder when OOB   Midodrine 2.5 BID started 7/10  Encourage PO inake

## 2025-07-11 NOTE — PROGRESS NOTES
Progress Note - PMR   Name: Gold Van 79 y.o. 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  S/p TMA on 6/23 by Dr. Galeas  Unclear if source control  OR Cultures: Serratia, MSSA, Pseudomonas  Blood cultures 6/26 with NGTD  > Per ID continue Cefepime through 8/6 for 6 weeks treatment  > PICC placed on 6/30  > Monitor labs at least weekly for toxicities  > Pain management  > Close incisional monitoring and care.  > NWB RLE  > PT/OT 3-5 hours/day, 5-7 days/week  > f/u Podiatry ~ 7/15 2:15p    Patient f/u w/ Dr. Galeas in 7/8: sutures removed; will need weekly debridement; full heel weight bearing and avoid heel - to toe gait in surgical shoe   > outpatient f/u with ID   - 7/22 Home PT/OT/RN  Foot drop, left  L ankle weakness in both PF/DF/EHL  Unclear if related to previous CVA in 2024 (CVA in setting of COPD exacerbation, but presented like impaired coordination)  Also with peripheral neuropathy  Denies back/radicular pain.  Was present in last [facility] admission in 2024.   Previously attempted to get AFO, but he declined it due to copay.   > Outpatient f/u with podiatry  CVA (cerebrovascular accident) ([facility])  10/2024  Presented with: loss of coordination in the setting of a COPD exacerbation. Imaging with significant and severe intracranial stenosis and atherosclerotic diseases as well as multiple foci of acute infarcts involving bilateral frontoparietal cortices.   Felt to be 2/2 hypoperfusion with question of hypercoag related to COVID>  Was on DAPT for 90 days, followed by ASA monotherapy  Also found to have Afib.   > Continue Plavix, Statin, Eliquis   Neuropathy  Likely 2/2 T2DM   Has some stocking dependent sensation impairment  May impact balance  No  associated pain  >Close monitoring of skin/incision.   Pressure injury of skin of sacral region  POA   >Frequent off loading  Turn patient Q2hr in bed  Specialty cushion when OOB   Local care as per wound care   Consulted nutrition/wound care for continuity  Outpatient f/u with wound care clinic   Pressure injury of right heel, stage 1  POA to ARC  > Elevate heels of bed  > Allevyn daily and monitoring Qshift  > Outpatient f/u with Podiatry   Acute pain  Minimal  Mostly in sacrum  > Tylenol PRN  > Oxycodone 2.5-5mg Q4hr PRN  > Monitor and wean as tolerated   Multiple open wounds of lower leg  Dermatitis associated with moisture  POA to ARC  > Continue local wound care to pretibial wounds  >Skin care 7/8  1-Hydraguard to Left heels 2 times per day and as needed    2-EHOB/WAFFLE or Roho  cushion when out of bed in chair.  3-Moisturize skin daily with skin nourishing cream  4-Elevate heels to offload pressure. EHOB off loading boots when in bed   5-Turn/reposition every 2 hours to offload and prevent pressure   6. Groin - cleanse with soap and water then pat dry Apply delfina antifungal cream 2 times per day and as needed   7. Apply silicone foam to the right heel ramone with a P an ddate peel and check skin integrity every shift change every 3 days   8. Cleanse sacrum, buttocks with soap and water apply triad paste to the wound on the right buttocks only then a silicone foam ramone with a T and date check skin integrity every shift and change every other day or when soiled   9. Left arm skin tear - cleanse with Normal saline then apply dermagram doubled then a silicone foam ramone with a T and date change every other day   10. Left tibial wound - cleanse with Normal saline then apply dermagran then top with a silicone foam ramone with a T and date change every other day   11. P 500 low air loss mattress   12. Dr. 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   Severe protein-calorie malnutrition (HCC)  Malnutrition Findings:    Adult Malnutrition type: Chronic illness  Adult Degree of Malnutrition: Other severe protein calorie malnutrition  Malnutrition Characteristics: Inadequate energy, Weight loss                  360 Statement: Severe protein/calorie malnutrition r/t inadequate intake as evidenced by 12.8% unplanned wt loss since 4/8/25, Consuming < 75% energy intake compared to estimated energy needs > 1 month, nonhealing wounds. Treated with oral diet + ONS of Isaac bid and Ensure Plus HP 1 x day    BMI Findings:           Body mass index is 19.94 kg/m².   > Nutrition consulted   >encourage PO intake  At risk for venous thromboembolism (VTE)  Fully anticoagulated on eliquis, SCDs, Ambulation   Chronic heart failure with preserved ejection fraction (HFpEF) ([facility])  Wt Readings from Last 3 Encounters:   07/11/25 66.7 kg (147 lb 0.8 oz)   06/26/25 62.1 kg (137 lb)   06/10/25 67.1 kg (148 lb)   6/26 Echo with EF 50-55%, Diastolic function WNL  Follows with Dr. Nelson  Home: patient declined diuretic regimen, is on Lisinopril 5mg daily  > Here: Lisinopril 5mg daily  > Monitor volume status, lytes  > I/O, daily weights.  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  Essential hypertension  Home: Atenolol 25mg daily, Lisinopril 5mg daily  > Here: Atenolol 25mg daily, Lisinopril 5mg daily  Gastroesophageal reflux disease without esophagitis  Home: Famotidine 20mg BID  Here: Famotidine daily  Hyponatremia  Chronically 130-132  Here: 132  Not receiving any treatment currently  Monitor with routine blood work   PAF (paroxysmal atrial fibrillation) ([facility])  Home: Eliquis mg BID, no rate/rhythm control  Here: Same.  Monitor and adjust as appropriate  Severe peripheral arterial disease ([facility])  S/p R CFA/SFA endarterectomy w/ bovine pericardial  patch, DCB and stenting of SFA, and angio of AT 5/13/25 ([facility])   > Plavix, Statin, Eliquis  > Monitor neurovascular exam at least daily  > f/u Vascular surgery   Type 2 diabetes mellitus with complication, without long-term current use of insulin (HCC)  Lab Results   Component Value Date    HGBA1C 6.6 (H) 06/18/2025       Recent Labs     07/10/25  1150 07/10/25  1608 07/10/25  2035 07/11/25  0751   POCGLU 139 144* 140 118       Blood Sugar Average: Last 72 hrs:  (P) 173.0521380728313163Ayep: Glipizide 10mg BID, Linagliptin 5mg daily (listed as not taking), Metformin 500mg at dinner   Here: Lantus 12 units QHS, Lispro 4 units TID with meals, CDI/Accuchecks.  Will need to transition to or optimize home meds, or else patient will need to diabetes kit/education.  Management as per IM.     Pruritic condition  At home on prednisone 5mg daily  Continued here, but at risk for poor blood sugar control and infection  Monitor for now.   Orthostatic hypotension  Significant orthostatic hypotension w/ dizziness  TEDS and binder when OOB   Midodrine 2.5 BID started 7/10  Encourage PO inake     History of Present Illness   Gold Van is a 79 y.o. male who presented to the hospital with septic shock from wound care appointment. Podiatry and was consulted. MRI R foot concerning for OM of 4th metatarsal. He underwent TMA R foot w/ Dr. Galeas on 6/23. Wound cultures w/ pseudomonas and serratia as well as MSSA. Also notable for 1:2 Bcx of MSSA. TTE w/o obvious vegetation. PICC placed 6/30.     Chief Complaint: f/u ambulatory dysfunction    Interval: Patient seen and examined in WC. Reports some urinary frequency yesterday night. Denied any dysuria, suprapubic pain, fever/chills.  Reports overall feeling better today. Last BM 7/10.       Objective   Functional Update:  Physical Therapy Occupational Therapy Speech Therapy   Weight Bearing Status: Non-weight bearing (RLE)  Transfers:  (MOD A STS with RW; difficulty maintaining NWB  RLE; min-mod A SBT surface to surface transfers)  Bed Mobility: Minimal Assistance  Amulation Distance (ft):  (TBA as able)  Ambulation:  (TBA as able)  Assistive Device for Ambulation:  (TBA as able)  Wheelchair Mobility Distance: 210 ft  Wheelchair Mobility: Minimal Assistance  Discharge Recommendations: Home with:  DC Home with:: Family Support, First Floor Setup, Home Physical Therapy   Eating: Supervision  Grooming: Supervision  Bathing: Minimal Assistance  Bathing: Minimal Assistance  Upper Body Dressing: Minimal Assistance  Lower Body Dressing: Moderate Assistance, Maximum Assistance  Toileting: Total Assistance  Tub/Shower Transfer:  (TBA)  Toilet Transfer: Total Assistance  Cognition: Within Defined Limits  Orientation: Person, Place, Time, Situation                   Temp:  [97.6 °F (36.4 °C)-97.9 °F (36.6 °C)] 97.6 °F (36.4 °C)  HR:  [] 113  Resp:  [16] 16  BP: ()/(44-66) 136/64  SpO2:  [95 %-98 %] 98 %    Physical Exam    Gen: No acute distress,   HEENT: Moist mucus membranes,   Cardiovascular:  no LE edema  Pulmonary: Non-labored breathing.  : No schmitt  GI:  non-distended.   MSK: R TMA   Integumentary: Skin is warm, dry. .  Neuro: Speech is intact. Appropriate to questioning.  Psych: Normal mood and affect.     DVT PPX: eliquis     Scheduled Meds:  Current Facility-Administered Medications   Medication Dose Route Frequency Provider Last Rate    acetaminophen  650 mg Oral Q6H PRN Annie L Dagnall, PA-C      albuterol  2 puff Inhalation Q4H PRN Annie L Dagnall, PA-C      apixaban  5 mg Oral BID Annie L Dagnall, PA-C      atenolol  25 mg Oral Daily Annie L Dagnall, PA-C      atorvastatin  40 mg Oral QPM Annie L Dagnall, PA-C      benzonatate  200 mg Oral TID PRN Annie L Dagnall, PA-C      Budeson-Glycopyrrol-Formoterol  2 puff Inhalation Q12H [facility] Ilana Payne PA-C      cefepime  2,000 mg Intravenous Q8H Annie L Felecial, PA-C Stopped (07/11/25 0539)    clopidogrel  75 mg  Oral Daily Annie L Dagnall, PA-C      docusate sodium  100 mg Oral Daily Ashley Depadua, MD      famotidine  20 mg Oral Daily Annie L Dagnall, PA-C      guaiFENesin  600 mg Oral Q12H SANDRA Annie L Dagnall, PA-C      insulin glargine  12 Units Subcutaneous HS Annie L Dagnall, PA-C      insulin lispro  1-5 Units Subcutaneous 4x Daily (with meals and at bedtime) Annie L Dagnall, PA-C      insulin lispro  4 Units Subcutaneous TID With Meals Annie L Dagnall, PA-C      lisinopril  2.5 mg Oral Daily Annie L Dagnall, PA-C      metFORMIN  500 mg Oral Daily With Dinner Annie L Dagnall, PA-C      midodrine  2.5 mg Oral BID AC Julianna Snyder MD      CALEB ANTIFUNGAL   Topical BID Annie L Dagnall, PA-C      montelukast  10 mg Oral HS Annie L Dagnall, PA-C      multivitamin-minerals  1 tablet Oral Daily Annie L Dagnall, PA-C      ondansetron  4 mg Oral Q6H PRN Annie L Dagnall, PA-C      oxyCODONE  2.5 mg Oral Q4H PRN Annie L Dagnall, PA-C      Or    oxyCODONE  5 mg Oral Q4H PRN Annie L Dagnall, PA-C      polyethylene glycol  17 g Oral Daily PRN Annie L Dagnall, PA-C      predniSONE  5 mg Oral Daily Annie L Dagnall, PA-C      sodium chloride  2 spray Each Nare Q1H PRN Annie L Dagnall, PA-C           Lab Results: I have reviewed the following results:  Results from last 7 days   Lab Units 07/07/25  0526   HEMOGLOBIN g/dL 9.8*   HEMATOCRIT % 31.4*   WBC Thousand/uL 6.75   PLATELETS Thousands/uL 281     Results from last 7 days   Lab Units 07/07/25  0526   BUN mg/dL 30*   SODIUM mmol/L 132*   POTASSIUM mmol/L 4.1   CHLORIDE mmol/L 102   CREATININE mg/dL 0.76   AST U/L 17   ALT U/L 19              Julianna Snyder MD   Physical Medicine and Rehabilitation   07/11/25    I have spent a total time of 35 minutes in caring for this patient on the day of the visit/encounter including Documenting in the medical record and Communicating with other healthcare professionals .

## 2025-07-11 NOTE — NUTRITION
07/11/25 1004   Biochemical Data,Medical Tests, and Procedures   Biochemical Data/Medical Tests/Procedures Lab values reviewed;Meds reviewed   Labs (Comment) BG elevated at times   Meds (Comment) eliquis, tenormin, lipitor, pepcid, colace, lantus, humalog, zestril, metformin, centrum, zofrna, prednisone   Nutrition-Focused Physical Exam   Nutrition-Focused Physical Exam Findings RN skin assessment reviewed  (Wound groin, wound left anterior arm, pressure injury irght buttocks, skin tear left pretibial, surgical wound right foot per documentation.)   Nutrition-Focused Physical Exam Findings Trace RLE edema. LBM 7/10.   Medical-Related Concerns PMH reviewed   Adequacy of Intake   Nutrition Modality PO   Feeding Route   PO Independent   Current PO Intake   Current Diet Order CCD 2 diet thin liquids   Nutrition Supplements Isaac;Ensure Plus High Protein  (ensure vanilla once daily; isaac orange in water BID)   Current Meal Intake 25-50%;50-75%;%   Intake Supplements 50-75%;%   Estimated calorie intake compared to estimated need Appears to be meeting minimum nutrient needs.   PES Statement   Problem Continue previous diagnosis   Recommendations/Interventions   Malnutrition/BMI Present Yes  (as previously noted)   Summary CCD 2 diet thin liquids. Isaac orange mixed with water BID. Vanilla ensure plus high protein once daily. Meal completions vary. Reports he is consuming the supplements. 7/11 147#, BMI=19.94; 4# weight gain from admission 7/5 143#. Weight fluctuating since admission. Medications reviewed. BG elevated at times. Upper and lower dentures. Trace RLE edema. Skin integrity reviewed. His wife was on speaker phone during visit. He states his appetite is about the same, not any worse. He denies any difficulty with meals. He states  his a not a breakfast person but has been making himself eat something. He states he also prefers a lighter lunch. Discussed increasing ensure supplement to BID; he is  agreeable. Also recommend adjusting diet to CCD 3. Monitor need for diet liberalization.   Interventions/Recommendations Adjust diet order;Supplement adjust   Intervention Comments increase ensure to BID; change diet to CCD3.   Education Assessment   Education Education not indicated at this time   Patient Nutrition Goals   Goal Avoid weight loss;Increase kcal/PRO intake;Improve skin integrity   Goal Status Met;Extended   Timeframe to complete goal by next f/u   Nutrition Complexity Risk   Nutrition complexity level Moderate risk   Follow up date 07/17/25

## 2025-07-11 NOTE — ASSESSMENT & PLAN NOTE
Lab Results   Component Value Date    HGBA1C 6.6 (H) 06/18/2025       Recent Labs     07/10/25  1150 07/10/25  1608 07/10/25  2035 07/11/25  0751   POCGLU 139 144* 140 118       Blood Sugar Average: Last 72 hrs:  (P) [id number]: Glipizide 10mg BID, Linagliptin 5mg daily (listed as not taking), Metformin 500mg at dinner   Here: Lantus 12 units QHS, Lispro 4 units TID with meals, CDI/Accuchecks.  Will need to transition to or optimize home meds, or else patient will need to diabetes kit/education.  Management as per IM.

## 2025-07-11 NOTE — ASSESSMENT & PLAN NOTE
Wt Readings from Last 3 Encounters:   07/11/25 66.7 kg (147 lb 0.8 oz)   06/26/25 62.1 kg (137 lb)   06/10/25 67.1 kg (148 lb)   6/26 Echo with EF 50-55%, Diastolic function WNL  Follows with Dr. Nelson  Home: patient declined diuretic regimen, is on Lisinopril 5mg daily  > Here: Lisinopril 5mg daily  > Monitor volume status, lytes  > I/O, daily weights.  > Management as per IM

## 2025-07-11 NOTE — NURSING NOTE
Patient able to ambulate assist x 1 with walker in room. Complains of dizziness off and on at times. Fluids encouraged. Sitting upright all of shift until after dinner in recliner. Appetite fair. Reports no pain. Educated on importance of repositioning self. Discussed insulin administration and patient comfortable with checking sugars at home. Wife also administered insulin at home to daughter years ago. Wife will need refresher if patient will be discharged on insulin. Will continue to monitor and follow plan of care.

## 2025-07-11 NOTE — NURSING NOTE
[facility]  RN Shift Note        Vitals  Vital Signs  Temperature: 97.9 °F (36.6 °C)  Temp Source: Tympanic  Pulse: 75  Heart Rate Source: Monitor  Respirations: 16  Blood Pressure: 132/62  MAP (mmHg): 77  BP Location: Left arm  BP Method: Automatic  Patient Position - Orthostatic VS: Lying    Vitals stable   Pain Pain Score: 0  Hospital Pain Intervention(s): Repositioned    No complaints of pain during shift.   GI   (WNL/X)  LBM  Incontinence (yes/no)     Gastrointestinal (WDL): Exceptions to WDL  Last BM Date: 07/10/25  Bowel Incontinence: No    Cont of bowel.      (WNL/X)  Incontinence (yes/no)  Bladder Scan  Urine Output  Shift Total Output  Unmeasured Occurrence   Genitourinary (WDL): Within Defined Limits  Urinary Incontinence: No    Urine: 400 mL    Unmeasured Urine Occurrence: 1 (Tthere was no hat in the bed side commode to measure void)      Cont of urine. Voids in urinal set up at bedside.   Nutrition  Diet/Precautions   PO Intake  Meal Consumption   Nutrition  Feeding: Able to feed self  Diet Type: Diabetic  Appetite: Fair          Tolerating current diet.   LDA  Drain Output           N/A   Skin   (WNL/X)  Integrity  Pressure Injury YES/NO: no     Integumentary (WDL): Exceptions to WDL  Skin Integrity: Redness, Bruising      Multiple skin areas refer to flowsheets. Dressings to areas CDI.   Behavior/Mood  Behavior log YES/NO: no Patient Behaviors/Mood: Calm, Cooperative    Pleasant with interactions.   Sleep Pattern  Sleep log no     Slept well.   Education  Medication/Device     DM education cont, fall precautions.   ADL/Mobility Summary  (transfer, bed mobility, ambulation)   Pt did not get OOB during shift. Independent with bed mobility/turning.     Miscellaneous    PICC to RUE. Abx admin as ordered.

## 2025-07-11 NOTE — PROGRESS NOTES
07/11/25 1030   Pain Assessment   Pain Assessment Tool 0-10   Pain Score No Pain   Restrictions/Precautions   Precautions Bed/chair alarms;Fall Risk;Pain;Supervision on toilet/commode;Limb alert;Multiple lines   RLE Weight Bearing Per Order WBAT  (heel WB in sx shoe)   ROM Restrictions No   Eating   Type of Assistance Needed Independent   Eating CARE Score 6   Grooming   Able To Comb/Brush Hair;Wash/Dry Face;Wash/Dry Hands   Limitation Noted In Safety;Strength   Findings Set-up   Shower/Bathe Self   Type of Assistance Needed Incidental touching   Comment CGA to stand to wash perineal and buttocks   Shower/Bathe Self CARE Score 4   Bathing   Assessed Bath Style Sponge Bath  (CHG wash)   Anticipated D/C Bath Style Shower;Sponge Bath   Able to Gather/Transport No   Able to Wash/Rinse/Dry (body part) Left Arm;Right Arm;L Upper Leg;R Upper Leg;Chest;Abdomen;Perineal Area;Buttocks  (Lower legs and feet covered)   Limitations Noted in Balance;Endurance;ROM;Safety;Strength   Positioning Seated;Standing   Tub/Shower Transfer   Reason Not Assessed Sponge Bath   Upper Body Dressing   Type of Assistance Needed Set-up / clean-up   Physical Assistance Level No physical assistance   Upper Body Dressing CARE Score 5   Lower Body Dressing   Type of Assistance Needed Physical assistance   Physical Assistance Level 76% or more   Comment Pt only able to pull undergarment and shorts over hips/buttocks   Lower Body Dressing CARE Score 2   Dressing/Undressing Clothing   Remove UB Clothes Pullover Shirt   Don UB Clothes Pullover Shirt   Remove LB Clothes Shorts;Undergarment   Don LB Clothes Shorts;Undergarment   Limitations Noted In Balance;Endurance;Safety;Strength;ROM   Positioning Supported Sit;Standing   Sit to Stand   Type of Assistance Needed Incidental touching   Comment CGA with RW   Sit to Stand CARE Score 4   Exercise Tools   Hand Gripper 2x15 bilat hands with 9lb digiflex   Other Exercise Tool 1 BUE therex 2x15 with 2# weight  including shoulder flexion/extension, abd/add, chest press, elbow flexion/extension, supination/pronation   Cognition   Overall Cognitive Status WFL   Arousal/Participation Alert;Cooperative;Responsive   Attention Within functional limits   Orientation Level Oriented X4   Memory Decreased recall of precautions   Following Commands Follows one step commands without difficulty   Assessment   Treatment Assessment Pt agreeable to OT session this AM; received sitting upright in recliner. ADLs, fxl transfers, and therex session completed; current LOF and details listed in respective sections. Pt overall independent eating; set-up grooming and UB dressing; CGA bathing and sit to stand transfer; maxA LB dressing. Following ADLs, OTS offered pt to complete therex in OT room; pt declined and reported he preferred to stay in his recliner. Pt had some c/o fatigue and dizziness during therex but improvement with frequent rest breaks. Pt also reported numbness/tingling in L hand, OTS notified SEARS; pt educated on positional changes and elevation. Pt reported he had carpal tunnel surgery earlier this year that was ineffective and stated it may be related. Pt's barriers impacting performance include decreased strength, decreased ROM, decreased endurance, impaired balance, decreased safety awareness, decreased skin integrity, and pain. Pt to benefit from continued skilled OT services to address listed barriers and prepare for safe d/c home.   Problem List Decreased strength;Decreased endurance;Impaired balance;Pain;Decreased range of motion;Decreased safety awareness;Decreased skin integrity   Plan   Treatment/Interventions ADL retraining;Functional transfer training;Therapeutic exercise;Endurance training;Patient/family training;Equipment eval/education;Compensatory technique education;OT   Progress Progressing toward goals   OT Therapy Minutes   OT Time In 1030   Therapy Time missed   Time missed? No

## 2025-07-11 NOTE — PROGRESS NOTES
"   07/11/25 0700   Pain Assessment   Pain Assessment Tool 0-10   Pain Score No Pain   Restrictions/Precautions   Precautions Bed/chair alarms;Fall Risk;Limb alert;Supervision on toilet/commode   RLE Weight Bearing Per Order   (heel WB in sx shoe)   ROM Restrictions No   Cognition   Overall Cognitive Status WFL   Arousal/Participation Alert;Cooperative;Responsive   Attention Within functional limits   Orientation Level Oriented X4   Memory Decreased recall of precautions   Following Commands Follows one step commands without difficulty   Subjective   Subjective \"I feel better than yesterday\"   Roll Left and Right   Type of Assistance Needed Independent   Physical Assistance Level No physical assistance   Comment (-) bed rail   Roll Left and Right CARE Score 6   Sit to Lying   Type of Assistance Needed Physical assistance   Physical Assistance Level 26%-50%   Comment min A for LE managmeent. HOB flat, no rails   Sit to Lying CARE Score 3   Lying to Sitting on Side of Bed   Type of Assistance Needed Physical assistance   Physical Assistance Level 26%-50%   Comment min A for turnk management. HOB flat, no rails   Lying to Sitting on Side of Bed CARE Score 3   Sit to Stand   Type of Assistance Needed Incidental touching   Physical Assistance Level   (-)   Comment CGA /c RW   Sit to Stand CARE Score 4   Bed-Chair Transfer   Type of Assistance Needed Incidental touching   Physical Assistance Level   (-)   Comment CGA /c RW   Chair/Bed-to-Chair Transfer CARE Score 4   Car Transfer   Reason if not Attempted Environmental limitations   Car Transfer CARE Score 10   Walk 10 Feet   Type of Assistance Needed Incidental touching   Physical Assistance Level   (-)   Comment CGA /c RW   Walk 10 Feet CARE Score 4   Walk 50 Feet with Two Turns   Type of Assistance Needed Incidental touching   Physical Assistance Level   (-)   Comment CGA /c RW   Reason if not Attempted   (-)   Walk 50 Feet with Two Turns CARE Score 4   Walk 150 Feet "   Reason if not Attempted Safety concerns   Walk 150 Feet CARE Score 88   Walking 10 Feet on Uneven Surfaces   Type of Assistance Needed Incidental touching   Physical Assistance Level   (-)   Comment CGA /c RW over green mat   Reason if not Attempted   (-)   Walking 10 Feet on Uneven Surfaces CARE Score 4   Ambulation   Primary Mode of Locomotion Prior to Admission Walk   Distance Walked (feet) 120 ft  (82', 120')   Assist Device Roller Walker   Gait Pattern Inconsistant Marsha;Slow Marsha;Decreased foot clearance;Step through;Decreased L stance;Decreased R stance;Improper weight shift;Forward Flexion   Limitations Noted In Balance;Endurance;Heel Strike;Posture;Safety;Speed;Strength   Provided Assistance with: Direction;Balance   Walk Assist Level Contact Guard   Findings CGA /c RW over level and unlevel surfaces   Does the patient walk? 2. Yes   Wheel 50 Feet with Two Turns   Type of Assistance Needed Supervision   Physical Assistance Level No physical assistance   Comment -   Wheel 50 Feet with Two Turns CARE Score 4   Wheel 150 Feet   Type of Assistance Needed Supervision   Physical Assistance Level No physical assistance   Comment -   Reason if not Attempted   (-)   Wheel 150 Feet CARE Score 4   Wheelchair mobility   Method Right upper extremity;Left upper extremity   Assistance Provided For Replace Leg Rest;Remove Leg Rest;Replace armrests;Remove armrests   Distance Level Surface (feet) 150 ft   Distance Wheeled 3% Grade 10 ft   Findings over level and unlevel surfaces   Does the patient use a wheelchair? 1. Yes   Type of Wheelchair Used 1. Manual   Curb or Single Stair   Style negotiated Single stair   Type of Assistance Needed Physical assistance;Verbal cues   Physical Assistance Level 51%-75%   Comment mod A /c BHRs x7 steps   1 Step (Curb) CARE Score 2   4 Steps   Type of Assistance Needed Physical assistance;Verbal cues   Physical Assistance Level 51%-75%   Comment mod A /c BHRs x7 steps   4 Steps CARE  Score 2   12 Steps   Reason if not Attempted Safety concerns   12 Steps CARE Score 88   Stairs   Type Stairs   # of Steps 7   Weight Bearing Precautions Fall Risk  (heel weight bearing R LE)   Assist Devices Bilateral Rail   Findings VC for proper seqencing and WBS   Toilet Transfer   Type of Assistance Needed Incidental touching   Physical Assistance Level   (-)   Comment CGA /c RW and grab bar via ambulatory approach   Toilet Transfer CARE Score 4   Toilet Transfer   Surface Assessed Standard Toilet   Limitations Noted In Balance;Endurance;Safety;Sequencing;UE Strength;LE Strength   Adaptive Equipment Grab Bar  (RW)   Positioning Concerns Arm Rest;Grab Bars;Safety   Findings completed toilet txfer via ambulatory approach /c RW and grab bars. (A) required for CM tasks   Therapeutic Interventions   Strengthening supine/SL BLE TE   Balance gait and txfer training   Other bed mobility, w/c mob, stairs   Assessment   Treatment Assessment Pt received supine in bed and agreeable to PT session. VS monitored t/o session, +OH noted and tachy t/o session; RN aware. Pt c/o dizziness t/o session that did worsen /c transitional changes despite compression garments donned prior to mobility. Maintained heel WB R LE in sx shoe t/o session requiring occasional VC for maintaining during functional mobility. Completed supine BLE TE as noted below for LE strengthening and muscular endurance for carryover into functional mobility. Completed supine<>sit /c min A for trunk/LE management w/o use of bed features. Transfer training completed focusing on sequence and technique for improved safety and balance /c functional mobility utilizing RW. Ambulated room>PT gym /c RW at [facility] requiring w/c follow by same therapist for pt safety and comfort 2* OH and inconsistent ambulation distance. Ambulates /c slow, slightly unsteady gait, decreased step length, step height, heel strike, b/l foot clearance, visual scanning, WB t/o R LE, R LE SLS, and  impaired WS. Propelled w/c 150' /c BUE for UB strengthening and aerobic conditioning to improve overall functional mobility. Initiated stair training as pt reports 6+7 steps to main living area /c R HR. Negotiated 7 steps /c BHRs at mod A level requiring occ VC for proper sequencing. Pt required increased time and therapeutic rest breaks to complete tasks 2* decreased cardiovascular stamina, muscular fatigue, and OH. Pt remains limited 2* decreased strength, endurance, balance, ROM, postural stability, impaired mobility, righting reactions, coordination, safety awareness, gait deviations/disorders, decreased ambulation safety, orthopedic restrictions, and symptomatic orthostatic hypotension. Pt would continue to benefit from skilled [facility] PT services as he is functioning below baseline as well as to maximize independence/safety /c MRADLs, decreasing fall risk and burden of care.   Problem List Decreased strength;Decreased endurance;Impaired balance;Decreased range of motion;Decreased skin integrity;Decreased mobility   Barriers to Discharge Inaccessible home environment   PT Barriers   Physical Impairment Decreased strength;Decreased endurance;Impaired balance;Decreased mobility;Decreased range of motion;Decreased skin integrity   Functional Limitation Stair negotiation;Standing;Transfers;Walking;Wheelchair management;Car transfers;Ramp negotiation   Plan   Treatment/Interventions Functional transfer training;LE strengthening/ROM;Elevations;Therapeutic exercise;Endurance training;Patient/family training;Equipment eval/education;Bed mobility;Gait training;Compensatory technique education;Continued evaluation   Progress Progressing toward goals   PT Therapy Minutes   PT Time In 0700   PT Time Out 0830   PT Total Time (minutes) 90   PT Mode of treatment - Individual (minutes) 90   PT Mode of treatment - Concurrent (minutes) 0   PT Mode of treatment - Group (minutes) 0   PT Mode of treatment - Co-treat (minutes) 0   PT  Mode of Treatment - Total time(minutes) 90 minutes   PT Cumulative Minutes 453   Therapy Time missed   Time missed? No     Supine BLE TE: 2x12  -SLRs  -SL leg press (L LE only)- green rosemarieyioball  -modified bridges over bolster    SL BLE TE: 2x12  -clamshells, green TB

## 2025-07-11 NOTE — PLAN OF CARE
Problem: PAIN - ADULT  Goal: Verbalizes/displays adequate comfort level or baseline comfort level  Description: Interventions:  - Encourage patient to monitor pain and request assistance  - Assess pain using appropriate pain scale  - Administer analgesics as ordered based on type and severity of pain and evaluate response  - Implement non-pharmacological measures as appropriate and evaluate response  - Consider cultural and social influences on pain and pain management  - Notify physician/advanced practitioner if interventions unsuccessful or patient reports new pain  - Educate patient/family on pain management process including their role and importance of  reporting pain   - Provide non-pharmacologic/complimentary pain relief interventions  Outcome: Progressing     Problem: INFECTION - ADULT  Goal: Absence or prevention of progression during hospitalization  Description: INTERVENTIONS:  - Assess and monitor for signs and symptoms of infection  - Monitor lab/diagnostic results  - Monitor all insertion sites, i.e. indwelling lines, tubes, and drains    - Moffit appropriate cooling/warming therapies per order  - Administer medications as ordered  - Instruct and encourage patient and family to use good hand hygiene technique  - Identify and instruct in appropriate isolation precautions for identified infection/condition  Outcome: Progressing     Problem: SAFETY ADULT  Goal: Maintain or return to baseline ADL function  Description: INTERVENTIONS:  -  Assess patient's ability to carry out ADLs; assess patient's baseline for ADL function and identify physical deficits which impact ability to perform ADLs (bathing, care of mouth/teeth, toileting, grooming, dressing, etc.)  - Assess/evaluate cause of self-care deficits   - Assess range of motion  - Assess patient's mobility; develop plan if impaired  - Assess patient's need for assistive devices and provide as appropriate  - Encourage maximum independence but  intervene and supervise when necessary  - Involve family in performance of ADLs  - Assess for home care needs following discharge   - OT to assist with ADL evaluation and planning for discharge  - Provide patient education as appropriate  - Monitor gait, balance and fatigue with ambulation    Outcome: Progressing

## 2025-07-11 NOTE — ASSESSMENT & PLAN NOTE
S/p R CFA/SFA endarterectomy w/ bovine pericardial patch, DCB and stenting of SFA, and angio of AT 5/13/25 ([facility])   > Plavix, Statin, Eliquis  > Monitor neurovascular exam at least daily  > f/u Vascular surgery

## 2025-07-11 NOTE — ASSESSMENT & PLAN NOTE
No protocol for requested medication.    Medication: 4/21/2025 last refilled    lisdexamfetamine (VYVANSE) 40 MG capsule         Sig: Take 1 capsule by mouth every morning.    Disp: 30 capsule    Refills: 0    Start: 5/18/2025    Earliest Fill Date: 5/18/2025     Last office visit date: 5/2/2025   Pharmacy: [facility] #[id number] [address] [place] [facility] [address]    Order pended, routed to clinician for review.      POA to [facility]  > Continue local wound care to pretibial wounds  >Skin care 7/8  1-Hydraguard to Left heels 2 times per day and as needed    2-EHOB/WAFFLE or Roho  cushion when out of bed in chair.  3-Moisturize skin daily with skin nourishing cream  4-Elevate heels to offload pressure. EHOB off loading boots when in bed   5-Turn/reposition every 2 hours to offload and prevent pressure   6. Groin - cleanse with soap and water then pat dry Apply delfina antifungal cream 2 times per day and as needed   7. Apply silicone foam to the right heel ramone with a P an ddate peel and check skin integrity every shift change every 3 days   8. Cleanse sacrum, buttocks with soap and water apply triad paste to the wound on the right buttocks only then a silicone foam ramone with a T and date check skin integrity every shift and change every other day or when soiled   9. Left arm skin tear - cleanse with Normal saline then apply dermagram doubled then a silicone foam ramone with a T and date change every other day   10. Left tibial wound - cleanse with Normal saline then apply dermagran then top with a silicone foam ramone with a T and date change every other day   11. P 500 low air loss mattress   12. Dr. 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

## 2025-07-11 NOTE — CASE MANAGEMENT
Met with patient and wife to review the discussion held during our interdisciplinary team meeting which included the barriers to recovery, interventions to be implemented, goals and plan to attain goals- reference Team Conference note for details. After review and discussion the patient/ family verbalized  understanding and agreement regarding the overall plan. Discussion included update from concurrent review with NRD on July 15.  Team anticipates DC home with spouse with [facility] for PT, OT, RN on July 22.  Further discussions surrounded patient's wife's concerns with IV antibiotics and need for education.  Wife stated that patient has appointments scheduled: July 14 with cardiology, ID July 23, and wound care July 16 at 230PM.  Will discuss upcoming appointments with medical team for direction.  Continue to follow for DC needs.

## 2025-07-11 NOTE — ASSESSMENT & PLAN NOTE
2/2 nonhealing wound, presented in septic shock.  S/p TMA on 6/23 by Dr. Galeas  Unclear if source control  OR Cultures: Serratia, MSSA, Pseudomonas  Blood cultures 6/26 with NGTD  > Per ID continue Cefepime through 8/6 for 6 weeks treatment  > PICC placed on 6/30  > Monitor labs at least weekly for toxicities  > Pain management  > Close incisional monitoring and care.  > NWB RLE  > PT/OT 3-5 hours/day, 5-7 days/week  > f/u Podiatry ~ 7/15 2:15p    Patient f/u w/ Dr. Galeas in 7/8: sutures removed; will need weekly debridement; full heel weight bearing and avoid heel - to toe gait in surgical shoe   > outpatient f/u with ID   - 7/22 Home PT/OT/RN

## 2025-07-11 NOTE — PROGRESS NOTES
Progress Note - Gold Van 79 y.o. male MRN: [id number]    Unit/Bed#: -01 Encounter: [id number]        Subjective:   Patient seen and examined at bedside after reviewing the chart and discussing the case with the caring staff.      Patient examined at bedside.  Patient reports he is feeling better today.  Still experiencing dizziness when out of bed but improved since starting midodrine yesterday.  He denies any acute complaints.    Physical Exam   Vitals: Blood pressure 108/58, pulse 84, temperature 97.6 °F (36.4 °C), temperature source Temporal, resp. rate 16, height 6' (1.829 m), weight 66.7 kg (147 lb 0.8 oz), SpO2 98%.,Body mass index is 19.94 kg/m².  Constitutional: Patient in no acute distress.  HEENT: PERR, EOMI, MMM.  Cardiovascular: Normal rate and regular rhythm.    Pulmonary/Chest: Effort normal and breath sounds normal.   Abdomen: Soft, + BS, NT.    Assessment/Plan:  Gold Van is a(n) 79 y.o. 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 Galeas.      Cardiac with hx of HTN, HLD, PAF, CHF, cardiomyopathy, orthostatic hypotension.  Continue atenolol 25 mg daily, lisinopril 5 mg daily (decr from 5mg on 7/10), atorvastatin 40 mg daily, Eliquis 5 mg twice daily, midodrine 2.5 mg twice daily (started 7/10).  Daily weights.  Apply abd binder and JOSEFA stockings.  T2DM.  Hgb A1c 6.6% on 6/18/25.  Home: Glucotrol XL 10 mg twice daily and metformin  mg daily.  Here: Lantus 12u nighty, Humalog 4u TID with meals, metformin  mg daily (restarted 7/6).  SSI with accuchecks ac/hs.  Carb controlled diet.    Hx CVA.  10/2024.  Continue Plavix, Eliquis and statin.  PAD.  S/p R CFA/SFA endarterectomy/stenting on 5/13/25.  Continue Plavix and statin.  GERD.  Patient is on famotidine 20 mg daily.    COPD/asthma.  Continue home Breztri (brought from home), Singulair 10 mg nightly, albuterol inhaler as needed.  CKD stage 2.  Stable.  Cr 0.77 -> 0.76 on 7/7.  Peak Cr 1.56.   Avoid nephrotoxins.  Cont to monitor.  Chronic generalized pruritus.  Continue prednisone 5 mg daily.    Chronic hyponatremia.  Level 132 -> 132 on 7/7/25.  Asymptomatic.  Cont to trend.    Anemia.  Stable.  Hgb 9.2 -> 9.8 on 7/7/25.  Cont to trend.   Cough.  Ongoing x weeks.  CXR on 6/17 negative.  Start Mucinex 600 mg q12h, tessalon perles as needed 7/6.  Cont to monitor.   MSSA bacteremia.  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  Now s/p right TMA.  Patient is on IV cefepime 2g q8h for 6 weeks (thru 8/6/25).  RUE PICC placed 6/30 and to be removed by RN after final dose of antibiotics.  Weekly CBCD and creatinine while on IV abx.  Pain and bowel regimen.  As per physiatry.  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  OR cultures grew serratia, pseudomonas, MSSA.  Patient is on IV cefepime 2g q8h for 6 weeks (thru 8/6/25).  Saw podiatry 7/8, sutures removed, full heel weight bearing with surgical shoe.  He is receiving intensive PT OT with management as per physiatry.      Anticipated date of discharge.  Tuesday 7/22/25    The patient was discussed with Dr. Hendrickson and he is in agreement with the above note.

## 2025-07-11 NOTE — NURSING NOTE
[facility]  RN Shift Note        Vitals  Vital Signs  Temperature: 97.6 °F (36.4 °C)  Temp Source: Temporal  Pulse: 84  Heart Rate Source: Monitor  Respirations: 16  Blood Pressure: 108/58  MAP (mmHg): 77  BP Location: Left arm  BP Method: Automatic  Patient Position - Orthostatic VS: Sitting        Follow up/Interventions-    Pain Pain Score: 0  Hospital Pain Intervention(s): Repositioned    Follow up/Interventions-    GI   (WNL/X)  LBM  Incontinence (yes/no)     Gastrointestinal (WDL): Within Defined Limits  Last BM Date: 07/11/25  Bowel Incontinence: No    Follow up/Interventions-       (WNL/X)  Incontinence (yes/no)  Bladder Scan  Urine Output  Shift Total Output  Unmeasured Occurrence   Genitourinary (WDL): Within Defined Limits  Urinary Incontinence: No    Urine: 200 mL    Unmeasured Urine Occurrence: 1 (Tthere was no hat in the bed side commode to measure void)      Follow up/Interventions-    Nutrition  Diet/Precautions   PO Intake  Meal Consumption   Nutrition  Feeding: Able to feed self  Diet Type: Diabetic  Appetite: Fair  P.O.: 236 mL  Percent Meals Eaten (%): 75      Strategies/Interventions- appetite remains poor  Follow up-    LDA  Drain Output             Follow up/Interventions-    Skin   (WNL/X)  Integrity  Pressure Injury YES/NO: no     Integumentary (WDL): Exceptions to WDL  Skin Integrity: Redness, Bruising  Description     Follow up/Interventions- dressing changed to pressure are on sacrum. Other dressings not due this shift   Behavior/Mood  Behavior log YES/NO: no Patient Behaviors/Mood: Calm, Cooperative    Follow up/Interventions-    Sleep Pattern  Sleep log no        Discharge Planning Education  (Medication/Device/Injections/Etc)     Education on insulin adminstration   ADL/Mobility Summary  (transfer, bed mobility, ambulation)   Assist x 1     Miscellaneous

## 2025-07-11 NOTE — ASSESSMENT & PLAN NOTE
Malnutrition Findings:   Adult Malnutrition type: Chronic illness  Adult Degree of Malnutrition: Other severe protein calorie malnutrition  Malnutrition Characteristics: Inadequate energy, Weight loss                  360 Statement: Severe protein/calorie malnutrition r/t inadequate intake as evidenced by 12.8% unplanned wt loss since 4/8/25, Consuming < 75% energy intake compared to estimated energy needs > 1 month, nonhealing wounds. Treated with oral diet + ONS of Isaac bid and Ensure Plus HP 1 x day    BMI Findings:           Body mass index is 19.94 kg/m².   > Nutrition consulted   >encourage PO intake

## 2025-07-12 NOTE — PROGRESS NOTES
OT Daily Progress       07/12/25 0812   Pain Assessment   Pain Assessment Tool 0-10   Pain Score No Pain   Eating   Type of Assistance Needed Independent   Physical Assistance Level No physical assistance   Eating CARE Score 6   Upper Body Dressing   Comment mod A to xiomy abdominal binder.   Putting On/Taking Off Footwear   Type of Assistance Needed Physical assistance   Physical Assistance Level 76% or more   Comment Max A overall to xiomy L tim hose and shoe. Max A R post op shoe   Putting On/Taking Off Footwear CARE Score 2   Lying to Sitting on Side of Bed   Type of Assistance Needed Supervision   Physical Assistance Level No physical assistance   Comment using bed features   Lying to Sitting on Side of Bed CARE Score 4   Sit to Stand   Type of Assistance Needed Supervision   Physical Assistance Level No physical assistance   Comment with RW   Sit to Stand CARE Score 4   Bed-Chair Transfer   Type of Assistance Needed Supervision;Incidental touching   Comment CG A with RW   Chair/Bed-to-Chair Transfer CARE Score 4   Cognition   Overall Cognitive Status WFL   Arousal/Participation Alert;Responsive;Cooperative   Attention Within functional limits   Orientation Level Oriented X4   Following Commands Follows all commands and directions without difficulty   Activity Tolerance   Activity Tolerance Patient tolerated treatment well   Assessment   Treatment Assessment Pt seen for follow up visit. Pt rec'd in bed, awake, alert. Currently beginning to eat breakfast. Pt completes self feeding tasks independently. RN student then present for med delivery. Self care tasks as well as transfer tasks as noted. Patient positioned OOB in chair with needs in reach. Continues use of UB free weight for general endurance exercises. Faye well. Remains generally deconditioned. Will continue to benefit from skilled OT to max I and safety with self care tasks prior to discharge. Progress treatment as status allows.   OT Therapy Minutes   OT  Time In 0830   OT Time Out 0930   OT Total Time (minutes) 60   OT Mode of treatment - Individual (minutes) 60

## 2025-07-12 NOTE — PROGRESS NOTES
"   07/12/25 1230   Pain Assessment   Pain Assessment Tool 0-10   Pain Score No Pain   Patient's Stated Pain Goal No pain   Restrictions/Precautions   Precautions Bed/chair alarms;Fall Risk;Pain;Supervision on toilet/commode;Limb alert;Multiple lines   Weight Bearing Restrictions Yes   RLE Weight Bearing Per Order WBAT  (Heel WB shoe)   ROM Restrictions No   Cognition   Overall Cognitive Status WFL   Arousal/Participation Alert;Responsive;Cooperative   Attention Within functional limits   Orientation Level Oriented X4   Memory Decreased recall of precautions   Following Commands Follows all commands and directions without difficulty   Comments pt sitting in recliner eating lunch   Subjective   Subjective \"i just got my lunch and they just hooked up my IV\"   Roll Left and Right   Comment OOB   Sit to Lying   Comment OOB   Lying to Sitting on Side of Bed   Comment OOB   Sit to Stand   Type of Assistance Needed Incidental touching   Physical Assistance Level 25% or less   Comment RW   Sit to Stand CARE Score 3   Bed-Chair Transfer   Type of Assistance Needed Incidental touching   Physical Assistance Level 25% or less   Comment CGA; RW   Chair/Bed-to-Chair Transfer CARE Score 3   Transfer Bed/Chair/Wheelchair   Limitations Noted In Balance;Endurance;UE Strength;LE Strength   Adaptive Equipment Roller Walker   Stand Pivot Contact Guard   Sit to Stand Contact Guard   Stand to Sit Contact Guard   Car Transfer   Reason if not Attempted Environmental limitations   Car Transfer CARE Score 10   Walk 10 Feet   Type of Assistance Needed Incidental touching   Physical Assistance Level 25% or less   Comment CGA; RW   Walk 10 Feet CARE Score 3   Walk 50 Feet with Two Turns   Type of Assistance Needed Incidental touching   Physical Assistance Level 25% or less   Comment CGA; RW   Walk 50 Feet with Two Turns CARE Score 3   Walk 150 Feet   Type of Assistance Needed Incidental touching   Physical Assistance Level 25% or less   Comment " CGA; RW   Walk 150 Feet CARE Score 3   Walking 10 Feet on Uneven Surfaces   Type of Assistance Needed Incidental touching   Physical Assistance Level 25% or less   Comment CGA; RW   Walking 10 Feet on Uneven Surfaces CARE Score 3   Ambulation   Primary Mode of Locomotion Prior to Admission Walk   Distance Walked (feet) 175 ft  (81')   Assist Device Roller Walker   Gait Pattern Inconsistant Marsha;Slow Marsha;Decreased foot clearance;Step through;Decreased L stance;Decreased R stance;Improper weight shift;Forward Flexion   Limitations Noted In Balance;Endurance;Heel Strike;Posture;Safety;Speed;Strength   Provided Assistance with: Balance;Direction   Walk Assist Level Contact Guard   Findings CGA; RW   Does the patient walk? 2. Yes   Therapeutic Interventions   Strengthening seated TE to b/l LE   Balance gait/transfers   Assessment   Treatment Assessment Pt participated in 60 minutes of concurrent tx addressing similar goals with a pt with similar deficits which was clinically appropriate to encourage socialization and participation. Pt tolerated TE/TA well. Refer to respective sections of note for details of session. Pt was able to ambulate farther distance today with CGA and RW.  He had no symptoms of dizziness during treatment session.   Pt barriers include: generalized weakness, decreased strength/ROM, decreased balance, decreased activity tolerance, and decreased overall functional mobility  and decreased safety awareness.. Plan is to continue with skilled PT as per POC.   Problem List Decreased strength;Decreased endurance;Impaired balance;Pain;Decreased range of motion;Decreased safety awareness;Decreased skin integrity   Barriers to Discharge Inaccessible home environment   PT Barriers   Physical Impairment Decreased strength;Decreased endurance;Impaired balance;Decreased mobility;Decreased range of motion;Decreased skin integrity   Functional Limitation Stair  negotiation;Standing;Transfers;Walking;Wheelchair management;Car transfers;Ramp negotiation   Plan   Treatment/Interventions Functional transfer training;LE strengthening/ROM;Elevations;Therapeutic exercise;Endurance training;Patient/family training;Bed mobility;Gait training   Progress Progressing toward goals   PT Therapy Minutes   PT Time In 1230   PT Time Out 1330   PT Total Time (minutes) 60   PT Mode of treatment - Concurrent (minutes) 60       SEATED TE b/l LE:  2 x 10 reps b/l   Hip flexion  LAQ  Hip ADD  Hip ABD tband-red  Hams curls tband-red  Ankle pumps

## 2025-07-12 NOTE — PLAN OF CARE
Problem: PAIN - ADULT  Goal: Verbalizes/displays adequate comfort level or baseline comfort level  Description: Interventions:  - Encourage patient to monitor pain and request assistance  - Assess pain using appropriate pain scale  - Administer analgesics as ordered based on type and severity of pain and evaluate response  - Implement non-pharmacological measures as appropriate and evaluate response  - Consider cultural and social influences on pain and pain management  - Notify physician/advanced practitioner if interventions unsuccessful or patient reports new pain  - Educate patient/family on pain management process including their role and importance of  reporting pain   - Provide non-pharmacologic/complimentary pain relief interventions  Outcome: Progressing     Problem: SAFETY ADULT  Goal: Patient will remain free of falls  Description: INTERVENTIONS:  - Educate patient/family on patient safety including physical limitations  - Instruct patient to call for assistance with activity   - Consider consulting OT/PT to assist with strengthening/mobility based on AM PAC & JH-HLM score  - Consult OT/PT to assist with strengthening/mobility   - Keep Call bell within reach  - Keep bed low and locked with side rails adjusted as appropriate  - Keep care items and personal belongings within reach  - Initiate and maintain comfort rounds  - Make Fall Risk Sign visible to staff    - Apply yellow socks and bracelet for high fall risk patients  - Consider moving patient to room near nurses station  Outcome: Progressing

## 2025-07-12 NOTE — NURSING NOTE
[facility]  RN Shift Note        Vitals  Vital Signs  Temperature: 97.6 °F (36.4 °C)  Temp Source: Temporal  Pulse: 77  Heart Rate Source: Monitor  Respirations: 16  Blood Pressure: 138/64  MAP (mmHg): 92  BP Location: Left arm  BP Method: Automatic  Patient Position - Orthostatic VS: Lying       Pain Pain Score: 0  Hospital Pain Intervention(s): Declines       GI   (WNL/X)  LBM  Incontinence (yes/no)     Gastrointestinal (WDL): Within Defined Limits  Last BM Date: 07/11/25  Bowel Incontinence: No        (WNL/X)  Incontinence (yes/no)  Bladder Scan  Urine Output  Shift Total Output  Unmeasured Occurrence   Genitourinary (WDL): Within Defined Limits  Urinary Incontinence: No    Urine: 250 mL       Nutrition  Diet/Precautions   PO Intake  Meal Consumption   Nutrition  Feeding: Able to feed self  Diet Type: Diabetic  Appetite: Fair  P.O.: 240 mL  Percent Meals Eaten (%): 75     Skin   (WNL/X)  Integrity  Pressure Injury      Integumentary (WDL): Exceptions to WDL  Skin Integrity: Redness, Bruising  Description      Behavior/Mood  Behavior log  Patient Behaviors/Mood: Calm, Cooperative       Sleep Pattern  Sleep log      Pt states they slept well over night    Discharge Planning Education  (Medication/Device/Injections/Etc)     Patient educated on repositioning    ADL/Mobility Summary  (transfer, bed mobility, ambulation)   Pt transfer RW x1      Miscellaneous

## 2025-07-12 NOTE — NURSING NOTE
[facility]  RN Shift Note        Vitals  Vital Signs  Temperature: 98.1 °F (36.7 °C)  Temp Source: Temporal  Pulse: 75  Heart Rate Source: Monitor  Respirations: 14  Blood Pressure: 124/63  MAP (mmHg): 77  BP Location: Left arm  BP Method: Automatic  Patient Position - Orthostatic VS: Lying       Pain Pain Score: 0  Hospital Pain Intervention(s): Repositioned       GI   (WNL/X)  LBM  Incontinence (yes/no)     Gastrointestinal (WDL): Exceptions to WDL  Last BM Date: 07/11/25  Bowel Incontinence: No        (WNL/X)  Incontinence (yes/no)  Bladder Scan  Urine Output  Shift Total Output  Unmeasured Occurrence   Genitourinary (WDL): Within Defined Limits  Urinary Incontinence: No    Urine: 500 mL       Nutrition  Diet/Precautions   PO Intake  Meal Consumption   Nutrition  Feeding: Able to feed self  Diet Type: Diabetic  Appetite: Fair  P.O.: 120 mL  Percent Meals Eaten (%): 75     Skin   (WNL/X)  Integrity  Pressure Injury      Integumentary (WDL): Exceptions to WDL  Skin Integrity: Redness, Bruising  Description      Behavior/Mood  Behavior log  Patient Behaviors/Mood: Calm, Cooperative       Sleep Pattern  Sleep log      Pt slept with no reported disturbances.   Discharge Planning Education  (Medication/Device/Injections/Etc)     Pt educated on medications and importance of wearing surgical shoe for transfers   ADL/Mobility Summary  (transfer, bed mobility, ambulation)   Pt transfer RW x1      Miscellaneous    Pt refused to stand for daily standing weight. Bed scale used.

## 2025-07-13 NOTE — NURSING NOTE
[facility]  RN Shift Note        Vitals  Vital Signs  Temperature: 98.8 °F (37.1 °C)  Temp Source: Temporal  Pulse: 78  Heart Rate Source: Monitor  Respirations: 16  Blood Pressure: 127/60  MAP (mmHg): 86  BP Location: Left arm  BP Method: Automatic  Patient Position - Orthostatic VS: Lying       Pain Pain Score: 0  Hospital Pain Intervention(s): Declines       GI   (WNL/X)  LBM  Incontinence (yes/no)     Gastrointestinal (WDL): Exceptions to WDL  Last BM Date: 07/11/25  Bowel Incontinence: No    Follow up/Interventions- bowel meds refused at this time.      (WNL/X)  Incontinence (yes/no)  Bladder Scan  Urine Output  Shift Total Output  Unmeasured Occurrence   Genitourinary (WDL): Within Defined Limits  Urinary Incontinence: No    Urine: 250 mL     Nutrition  Diet/Precautions   PO Intake  Meal Consumption   Nutrition  Feeding: Able to feed self  Diet Type: Diabetic  Appetite: Fair  P.O.: 240 mL  Percent Meals Eaten (%): 75       Skin   (WNL/X)  Integrity  Pressure Injury     Integumentary (WDL): Exceptions to WDL  Skin Integrity: Redness, Bruising  Description        Behavior/Mood  Behavior log  Patient Behaviors/Mood: Calm, Cooperative    Follow up/Interventions-    Sleep Pattern  Sleep log      Pt slept well with no reported distress.   Discharge Planning Education  (Medication/Device/Injections/Etc)   Pt educated on variations of pill appearance when he voiced concerne about differences between pills provided here and at home.   ADL/Mobility Summary  (transfer, bed mobility, ambulation)   Pt ambulates 1x RW with surgical shoe in place.

## 2025-07-13 NOTE — NURSING NOTE
[facility]  RN Shift Note        Vitals  Vital Signs  Temperature: 98.4 °F (36.9 °C)  Temp Source: Tympanic  Pulse: 80  Heart Rate Source: Monitor  Respirations: 16  Blood Pressure: 102/57  MAP (mmHg): 86  BP Location: Left arm  BP Method: Automatic  Patient Position - Orthostatic VS: Sitting        Follow up/Interventions-    Pain Pain Score: 0  Hospital Pain Intervention(s): Declines    Follow up/Interventions-    GI   (WNL/X)  LBM  Incontinence (yes/no)     Gastrointestinal (WDL): Within Defined Limits  Last BM Date: 07/13/25  Bowel Incontinence: No    Follow up/Interventions-       (WNL/X)  Incontinence (yes/no)  Bladder Scan  Urine Output  Shift Total Output  Unmeasured Occurrence   Genitourinary (WDL): Within Defined Limits  Urinary Incontinence: No    Urine: 250 mL    Unmeasured Urine Occurrence: 1      Follow up/Interventions- voiding qS    Nutrition  Diet/Precautions   PO Intake  Meal Consumption   Nutrition  Feeding: Able to feed self  Diet Type: Diabetic  Appetite: Fair  P.O.: 200 mL  Percent Meals Eaten (%): 50      Strategies/Interventions-   Follow up- tolerating current diet without difficulty    LDA  Drain Output             Follow up/Interventions-    Skin   (WNL/X)  Integrity  Pressure Injury YES/NO: no     Integumentary (WDL): Exceptions to WDL  Skin Integrity: Redness, Bruising  Description     Follow up/Interventions- Wound care provided by nursing student/instructor today    Behavior/Mood  Behavior log YES/NO: no Patient Behaviors/Mood: Calm, Cooperative    Follow up/Interventions- Pleasant with interactions    Sleep Pattern  Sleep log no        Discharge Planning Education  (Medication/Device/Injections/Etc)        ADL/Mobility Summary  (transfer, bed mobility, ambulation)   Transfers assist of 1 with RW. Participated in therapy sessions and tolerated same well      Miscellaneous

## 2025-07-13 NOTE — PROGRESS NOTES
07/13/25 1050   Pain Assessment   Pain Assessment Tool 0-10   Pain Score No Pain   Restrictions/Precautions   Precautions Bed/chair alarms;Fall Risk;Supervision on toilet/commode;Pain;Multiple lines;Limb alert   RLE Weight Bearing Per Order WBAT  (heel WB, surgical shoe)   Eating   Type of Assistance Needed Independent   Physical Assistance Level No physical assistance   Eating CARE Score 6   Sit to Stand   Type of Assistance Needed Incidental touching;Verbal cues   Sit to Stand CARE Score 4   Exercise Tools   UE Ergometer minimal resistance 4 F then 10 min break then 4B then 10 min break   Hand Gripper 5# digi x 10 only due to discomfort   Other Exercise Tool 1 yellow flex bar 2x10 reps downward only, unable to tolerate upward   Other Exercise Tool 2 2# DB sup/pro, chest press and bicep curl 2x15 seaated unsupported in chair   Cognition   Overall Cognitive Status WFL   Arousal/Participation Alert;Cooperative   Attention Within functional limits   Orientation Level Oriented X4   Memory Decreased recall of precautions   Following Commands Follows all commands and directions without difficulty   Additional Activities   Additional Activities Comments functional mobility to/from pt room to OT room with RW and CGA   Activity Tolerance   Activity Tolerance Patient tolerated treatment well;Patient limited by fatigue   Assessment   Treatment Assessment OT session addressed TE/TA for generalized strength/ROM and endurance. Pt presents with diminished activity tolerance and requires frequent and lengthy rest periods. Pt is aware of DBng and completes throughout session. Pt had episode of dizziness after functional mobility, once seated OT assessed vitals at 105/57. Pt had compression sock to L LE and binder donned. Pt does report when these episodes occur he minds it in his eyes.Pt is making small gains toward OT goals.   Pt's barriers include decreased strength and ROM, decreased activity tolerance, impaired balance,  decreased safety awareness, decreased skin integrity, and pain. Pt to benefit from continued skilled OT services to address listed barriers and prepare for safe d/c home.   Prognosis Good   Problem List Decreased strength;Decreased range of motion;Decreased endurance;Impaired balance;Pain;Decreased skin integrity;Decreased safety awareness   Plan   Treatment/Interventions Functional transfer training;Therapeutic exercise;Endurance training;Patient/family training;Equipment eval/education;Gait training;Compensatory technique education;Spoke to nursing;OT   Progress Progressing toward goals   OT Therapy Minutes   OT Time In 1050   OT Time Out 1205   OT Total Time (minutes) 75   OT Mode of treatment - Individual (minutes) 75   OT Mode of treatment - Concurrent (minutes) 0   OT Mode of treatment - Group (minutes) 0   OT Mode of treatment - Co-treat (minutes) 0   OT Mode of Treatment - Total time(minutes) 75 minutes   OT Cumulative Minutes 75   Therapy Time missed   Time missed? No

## 2025-07-14 ENCOUNTER — TELEPHONE (OUTPATIENT)
Age: 80
End: 2025-07-14

## 2025-07-14 NOTE — PROGRESS NOTES
Progress Note - PMR   Name: Gold Van 79 y.o. male I MRN: [id number]  Unit/Bed#: 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  S/p TMA on 6/23 by Dr. Galeas  Unclear if source control  OR Cultures: Serratia, MSSA, Pseudomonas  Blood cultures 6/26 with NGTD  > Per ID continue Cefepime through 8/6 for 6 weeks treatment  > PICC placed on 6/30  > Monitor labs at least weekly for toxicities  > Pain management  > Close incisional monitoring and care.  > NWB RLE  > PT/OT 3-5 hours/day, 5-7 days/week  > f/u Podiatry ~ 7/15 2:15p    Patient f/u w/ Dr. Galeas in 7/8: sutures removed; will need weekly debridement; full heel weight bearing and avoid heel - to toe gait in surgical shoe   > outpatient f/u with ID   - 7/22 Home PT/OT/RN  Foot drop, left  L ankle weakness in both PF/DF/EHL  Unclear if related to previous CVA in 2024 (CVA in setting of COPD exacerbation, but presented like impaired coordination)  Also with peripheral neuropathy  Denies back/radicular pain.  Was present in last [facility] [place] admission in 2024.   Previously attempted to get AFO, but he declined it due to copay.   > Outpatient f/u with podiatry  CVA (cerebrovascular accident) ([facility])  10/2024  Presented with: loss of coordination in the setting of a COPD exacerbation. Imaging with significant and severe intracranial stenosis and atherosclerotic diseases as well as multiple foci of acute infarcts involving bilateral frontoparietal cortices.   Felt to be 2/2 hypoperfusion with question of hypercoag related to COVID>  Was on DAPT for 90 days, followed by ASA monotherapy  Also found to have Afib.   > Continue Plavix, Statin, Eliquis   Neuropathy  Likely 2/2 T2DM   Has some stocking dependent sensation impairment  May impact balance  No  associated pain  >Close monitoring of skin/incision.   Pressure injury of skin of sacral region  POA   >Frequent off loading  Turn patient Q2hr in bed  Specialty cushion when OOB   Local care as per wound care   Consulted nutrition/wound care for continuity  Outpatient f/u with wound care clinic   Pressure injury of right heel, stage 1  POA to AR[facility]  > Elevate heels of bed  > Allevyn daily and monitoring Qshift  > Outpatient f/u with Podiatry   Acute pain  Minimal  Mostly in sacrum  > Tylenol PRN  > Oxycodone 2.5-5mg Q4hr PRN  > Monitor and wean as tolerated   Multiple open wounds of lower leg  Dermatitis associated with moisture  POA to ARC  > Continue local wound care to pretibial wounds  >Skin care 7/8  1-Hydraguard to Left heels 2 times per day and as needed    2-EHOB/WAFFLE or Roho  cushion when out of bed in chair.  3-Moisturize skin daily with skin nourishing cream  4-Elevate heels to offload pressure. EHOB off loading boots when in bed   5-Turn/reposition every 2 hours to offload and prevent pressure   6. Groin - cleanse with soap and water then pat dry Apply delfina antifungal cream 2 times per day and as needed   7. Apply silicone foam to the right heel ramone with a P an ddate peel and check skin integrity every shift change every 3 days   8. Cleanse sacrum, buttocks with soap and water apply triad paste to the wound on the right buttocks only then a silicone foam ramone with a T and date check skin integrity every shift and change every other day or when soiled   9. Left arm skin tear - cleanse with Normal saline then apply dermagram doubled then a silicone foam ramone with a T and date change every other day   10. Left tibial wound - cleanse with Normal saline then apply dermagran then top with a silicone foam ramone with a T and date change every other day   11. P 500 low air loss mattress   12. Dr. 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   Severe protein-calorie malnutrition (HCC)  Malnutrition Findings:    Adult Malnutrition type: Chronic illness  Adult Degree of Malnutrition: Other severe protein calorie malnutrition  Malnutrition Characteristics: Inadequate energy, Weight loss                  360 Statement: Severe protein/calorie malnutrition r/t inadequate intake as evidenced by 12.8% unplanned wt loss since 4/8/25, Consuming < 75% energy intake compared to estimated energy needs > 1 month, nonhealing wounds. Treated with oral diet + ONS of Isaac bid and Ensure Plus HP 1 x day    BMI Findings:           Body mass index is 19.05 kg/m².   > Nutrition consulted   >encourage PO intake  At risk for venous thromboembolism (VTE)  Fully anticoagulated on eliquis, SCDs, Ambulation   Chronic heart failure with preserved ejection fraction (HFpEF) ([facility])  Wt Readings from Last 3 Encounters:   07/14/25 63.7 kg (140 lb 6.9 oz)   06/26/25 62.1 kg (137 lb)   06/10/25 67.1 kg (148 lb)   6/26 Echo with EF 50-55%, Diastolic function WNL  Follows with Dr. Nelson  Home: patient declined diuretic regimen, is on Lisinopril 5mg daily  > Here: Lisinopril 5mg daily  > Monitor volume status, lytes  > I/O, daily weights.  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  Essential hypertension  Home: Atenolol 25mg daily, Lisinopril 5mg daily  > Here: Atenolol 25mg daily, Lisinopril 5mg daily  Gastroesophageal reflux disease without esophagitis  Home: Famotidine 20mg BID  Here: Famotidine daily  Hyponatremia  Chronically 130-132  Here: 132  Not receiving any treatment currently  Monitor with routine blood work   PAF (paroxysmal atrial fibrillation) ([facility])  Home: Eliquis mg BID, no rate/rhythm control  Here: Same.  Monitor and adjust as appropriate  Severe peripheral arterial disease ([facility])  S/p R CFA/SFA endarterectomy w/ bovine pericardial  patch, DCB and stenting of SFA, and angio of AT 5/13/25 ([facility])   > Plavix, Statin, Eliquis  > Monitor neurovascular exam at least daily  > f/u Vascular surgery   Type 2 diabetes mellitus with complication, without long-term current use of insulin (HCC)  Lab Results   Component Value Date    HGBA1C 6.6 (H) 06/18/2025       Recent Labs     07/13/25  1049 07/13/25  1625 07/13/25 2014 07/14/25  0736   POCGLU 216* 273* 279* 142*       Blood Sugar Average: Last 72 hrs:  (P) [id number]: Glipizide 10mg BID, Linagliptin 5mg daily (listed as not taking), Metformin 500mg at dinner   Here: Lantus 12 units QHS, Lispro 4 units TID with meals, CDI/Accuchecks.  Will need to transition to or optimize home meds, or else patient will need to diabetes kit/education.  Management as per IM.     Pruritic condition  At home on prednisone 5mg daily  Continued here, but at risk for poor blood sugar control and infection  Monitor for now.   Orthostatic hypotension  Significant orthostatic hypotension w/ dizziness  TEDS and binder when OOB   Midodrine 2.5 BID started 7/10  Encourage PO inake     History of Present Illness   Gold Van is a 79 y.o. male who presented to the hospital with septic shock from wound care appointment. Podiatry and was consulted. MRI R foot concerning for OM of 4th metatarsal. He underwent TMA R foot w/ Dr. Galeas on 6/23. Wound cultures w/ pseudomonas and serratia as well as MSSA. Also notable for 1:2 Bcx of MSSA. TTE w/o obvious vegetation. PICC placed 6/30.        Chief Complaint: f/u ambulatory dysfunction    Interval: Patient seen and examined in recliner. No events overnight.  Reports overall feeling well. Notes dizziness when sitting up has improved. Last BM 7/13.        Objective   Functional Update:  Physical Therapy Occupational Therapy Speech Therapy   Weight Bearing Status: Non-weight bearing (RLE)  Transfers:  (MOD A STS with RW; difficulty maintaining NWB RLE; min-mod A SBT surface  to surface transfers)  Bed Mobility: Minimal Assistance  Amulation Distance (ft):  (TBA as able)  Ambulation:  (TBA as able)  Assistive Device for Ambulation:  (TBA as able)  Wheelchair Mobility Distance: 210 ft  Wheelchair Mobility: Minimal Assistance  Discharge Recommendations: Home with:  DC Home with:: Family Support, First Floor Setup, Home Physical Therapy   Eating: Supervision  Grooming: Supervision  Bathing: Minimal Assistance  Bathing: Minimal Assistance  Upper Body Dressing: Minimal Assistance  Lower Body Dressing: Moderate Assistance, Maximum Assistance  Toileting: Total Assistance  Tub/Shower Transfer:  (TBA)  Toilet Transfer: Total Assistance  Cognition: Within Defined Limits  Orientation: Person, Place, Time, Situation                   Temp:  [96.4 °F (35.8 °C)-98.4 °F (36.9 °C)] 96.4 °F (35.8 °C)  HR:  [63-83] 63  Resp:  [16] 16  BP: (100-158)/(58-70) 158/70  SpO2:  [98 %-99 %] 98 %    Physical Exam    Gen: No acute distress,   HEENT: Moist mucus membranes,   Cardiovascular:  No edema  Pulmonary: Non-labored breathing. On RA   : No schmitt  GI:  non-distended.   MSK: RTMA in surgical shoe   Integumentary: Skin is warm, dry. .  Neuro: Speech is intact. Appropriate to questioning.  Psych: Normal mood and affect.     DVT PPX: eliquis     Scheduled Meds:  Current Facility-Administered Medications   Medication Dose Route Frequency Provider Last Rate    acetaminophen  650 mg Oral Q6H PRN Annie L Dagnall, PA-C      albuterol  2 puff Inhalation Q4H PRN Annie L Dagnall, PA-C      apixaban  5 mg Oral BID Annie L Dagnall, PA-C      atenolol  25 mg Oral Daily Annie L Dagnall, PA-C      atorvastatin  40 mg Oral QPM Annie L Dagnall, PA-C      benzonatate  200 mg Oral TID PRN Annie L Dagnall, PA-C      Budeson-Glycopyrrol-Formoterol  2 puff Inhalation Q12H [facility] Ilana Payne PA-C      cefepime  2,000 mg Intravenous Q8H Annie L Dagnall, PA-C 2,000 mg (07/14/25 0507)    clopidogrel  75 mg Oral  Daily Annie L Dagnall, PA-C      docusate sodium  100 mg Oral Daily Ashley Depadua, MD      famotidine  20 mg Oral Daily Annie L Dagnall, PA-C      guaiFENesin  600 mg Oral Q12H SANDRA Annie L Dagnall, PA-C      insulin glargine  12 Units Subcutaneous HS Annie L Dagnall, PA-C      insulin lispro  1-5 Units Subcutaneous 4x Daily (with meals and at bedtime) Annie L Dagnall, PA-C      insulin lispro  4 Units Subcutaneous TID With Meals Annie L Dagnall, PA-C      lisinopril  2.5 mg Oral Daily Annie L Dagnall, PA-C      metFORMIN  500 mg Oral Daily With Dinner Annie L Chelseynall, PA-C      midodrine  2.5 mg Oral BID AC Julianna Snyder MD      CALEB ANTIFUNGAL   Topical BID Annie L Dagnall, PA-C      montelukast  10 mg Oral HS Annie L Dagnall, PA-C      multivitamin-minerals  1 tablet Oral Daily Annie L Dagnall, PA-C      ondansetron  4 mg Oral Q6H PRN Annie L Dagnall, PA-C      oxyCODONE  2.5 mg Oral Q4H PRN Annie L Dagnall, PA-C      Or    oxyCODONE  5 mg Oral Q4H PRN Annie L Dagnall, PA-C      polyethylene glycol  17 g Oral Daily PRN Annie L Dagnall, PA-C      predniSONE  10 mg Oral Every Other Day Binh Hurd, DO      predniSONE  5 mg Oral Every Other Day Binh Hurd, DO      sodium chloride  2 spray Each Nare Q1H PRN Annie L Dagnall, PA-C           Lab Results: I have reviewed the following results:  Results from last 7 days   Lab Units 07/14/25  0450   HEMOGLOBIN g/dL 9.5*   HEMATOCRIT % 30.6*   WBC Thousand/uL 7.03   PLATELETS Thousands/uL 265     Results from last 7 days   Lab Units 07/14/25  0450   BUN mg/dL 42*   SODIUM mmol/L 132*   POTASSIUM mmol/L 4.1   CHLORIDE mmol/L 102   CREATININE mg/dL 0.79   AST U/L 12*   ALT U/L 13              Julianna Snyder MD   Physical Medicine and Rehabilitation   07/14/25    I have spent a total time of 37minutes in caring for this patient on the day of the visit/encounter including Counseling / Coordination of care, Documenting in the medical  record, Reviewing/placing orders in the medical record (including tests, medications, and/or procedures), and Communicating with other healthcare professionals .

## 2025-07-14 NOTE — ASSESSMENT & PLAN NOTE
Malnutrition Findings:   Adult Malnutrition type: Chronic illness  Adult Degree of Malnutrition: Other severe protein calorie malnutrition  Malnutrition Characteristics: Inadequate energy, Weight loss                  360 Statement: Severe protein/calorie malnutrition r/t inadequate intake as evidenced by 12.8% unplanned wt loss since 4/8/25, Consuming < 75% energy intake compared to estimated energy needs > 1 month, nonhealing wounds. Treated with oral diet + ONS of Isaac bid and Ensure Plus HP 1 x day    BMI Findings:           Body mass index is 19.05 kg/m².   > Nutrition consulted   >encourage PO intake

## 2025-07-14 NOTE — ASSESSMENT & PLAN NOTE
Wt Readings from Last 3 Encounters:   07/14/25 63.7 kg (140 lb 6.9 oz)   06/26/25 62.1 kg (137 lb)   06/10/25 67.1 kg (148 lb)   6/26 Echo with EF 50-55%, Diastolic function WNL  Follows with Dr. Nelson  Home: patient declined diuretic regimen, is on Lisinopril 5mg daily  > Here: Lisinopril 5mg daily  > Monitor volume status, lytes  > I/O, daily weights.  > Management as per IM

## 2025-07-14 NOTE — PROGRESS NOTES
07/14/25 0900   Pain Assessment   Pain Assessment Tool 0-10   Pain Score No Pain   Restrictions/Precautions   Precautions Bed/chair alarms;Fall Risk;Limb alert;Multiple lines;Pain;Supervision on toilet/commode   Weight Bearing Restrictions Yes   RLE Weight Bearing Per Order WBAT  (heel WB with surgical shoe)   ROM Restrictions No   Braces or Orthoses Other (Comment)  (surgical shoe R LE, L LE JOSEFA)   Cognition   Overall Cognitive Status WFL   Arousal/Participation Alert;Responsive;Cooperative   Attention Within functional limits   Orientation Level Oriented X4   Memory Decreased recall of precautions   Following Commands Follows all commands and directions without difficulty   Subjective   Subjective Patient reports he discussed installing another handrail for his stairs to enter his home   Roll Left and Right   Comment Pt OOB   Sit to Lying   Comment Pt OOB   Lying to Sitting on Side of Bed   Comment Pt OOB   Sit to Stand   Type of Assistance Needed Supervision   Physical Assistance Level No physical assistance   Comment Cl S RW   Sit to Stand CARE Score 4   Bed-Chair Transfer   Type of Assistance Needed Supervision   Physical Assistance Level No physical assistance   Comment Cl S RW   Chair/Bed-to-Chair Transfer CARE Score 4   Car Transfer   Reason if not Attempted Environmental limitations   Car Transfer CARE Score 10   Walk 10 Feet   Type of Assistance Needed Supervision   Physical Assistance Level No physical assistance   Comment Cl S RW   Walk 10 Feet CARE Score 4   Walk 50 Feet with Two Turns   Type of Assistance Needed Supervision   Physical Assistance Level No physical assistance   Comment Cl S RW   Walk 50 Feet with Two Turns CARE Score 4   Walk 150 Feet   Type of Assistance Needed Supervision   Physical Assistance Level No physical assistance   Comment Cl S RW   Walk 150 Feet CARE Score 4   Ambulation   Primary Mode of Locomotion Prior to Admission Walk   Distance Walked (feet) 150 ft  (15' x 3)    Assist Device Roller Walker   Findings Cl S RW   Does the patient walk? 2. Yes   Wheelchair mobility   Findings not focus of session   Curb or Single Stair   Style negotiated Single stair   Type of Assistance Needed Incidental touching;Verbal cues   Physical Assistance Level No physical assistance   Comment CGA, B/L HR, non-reciprocal, 7  steps x 2 with brief seated rest break; VC for LE sequencing to offload R LE   1 Step (Curb) CARE Score 4   4 Steps   Type of Assistance Needed Incidental touching;Verbal cues   Physical Assistance Level No physical assistance   Comment CGA, B/L HR, non-reciprocal, 7  steps x 2 with brief seated rest break; VC for LE sequencing to offload R LE   4 Steps CARE Score 4   12 Steps   Type of Assistance Needed Incidental touching;Verbal cues   Physical Assistance Level No physical assistance   Comment CGA, B/L HR, non-reciprocal, 7  steps x 2 with brief seated rest break; VC for LE sequencing to offload R LE   12 Steps CARE Score 4   Picking Up Object   Comment orthostasis concerns   Reason if not Attempted Safety concerns   Picking Up Object CARE Score 88   Toilet Transfer   Comment did not require during session   Therapeutic Interventions   Strengthening seated LE TE   Other transfer training, gait training, stair training   Other Comments   Comments /56 following short ambulation, 104/56 following seated LE TE, 112/54 following stair negotiation, no c/o of orthostasis symptoms throughout session   Assessment   Treatment Assessment Pt agreeable to PT this AM, received sitting upright in chair. Pt's /56 x 2, 112/54 during session, with no c/o dizziness/lightheadedness throughout session with upright mobility. Pt is demonstrating improved ability to maintain WB through heel and improved LE strength/endurance as indicated by decreased assist for all transfers/gait, as well as increased stair negotiation this session. Educated pt on importance of  installation of second handrail for NEERU into main living area of home, verbalized understanding. Pt remains limited by decreased activity tolerance, requiring increased time and seated rest breaks between all activities. Will continue with current PT POC to improve deficits and promote return to PLOF.   Problem List Decreased strength;Decreased range of motion;Decreased endurance;Impaired balance;Pain;Decreased skin integrity;Decreased safety awareness   PT Barriers   Physical Impairment Decreased strength;Decreased endurance;Impaired balance;Decreased mobility;Decreased range of motion;Decreased skin integrity   Functional Limitation Stair negotiation;Standing;Transfers;Walking;Wheelchair management;Car transfers;Ramp negotiation   Plan   Treatment/Interventions Functional transfer training;LE strengthening/ROM;Therapeutic exercise;Endurance training;Patient/family training;Equipment eval/education;Bed mobility;Gait training   Progress Progressing toward goals   PT Therapy Minutes   PT Time In 0900   PT Time Out 1030   PT Total Time (minutes) 90   PT Mode of treatment - Individual (minutes) 90   PT Mode of treatment - Concurrent (minutes) 0   PT Mode of treatment - Group (minutes) 0   PT Mode of treatment - Co-treat (minutes) 0   PT Mode of Treatment - Total time(minutes) 90 minutes   PT Cumulative Minutes 543     Seated LE TE:  3x10, B/L LEs, 2#  March  LAQ  Hip ABd - green T-band  Hip ADd - ball Nate  GS  HR  TR    Patient remains OOB in chair, all needs in reach.  Alarm in place and activated.  Encouraged use of call bell, patient verbalizes understanding.

## 2025-07-14 NOTE — ASSESSMENT & PLAN NOTE
S/p R CFA/SFA endarterectomy w/ bovine pericardial patch, DCB and stenting of SFA, and angio of AT 5/13/25 ([facility])   > Plavix, Statin, Eliquis  > Monitor neurovascular exam at least daily  > f/u Vascular surgery

## 2025-07-14 NOTE — NURSING NOTE
[facility]  RN Shift Note        Vitals  Vital Signs  Temperature: 98.4 °F (36.9 °C)  Temp Source: Temporal  Pulse: 83  Heart Rate Source: Monitor  Respirations: 16  Blood Pressure: 100/58  MAP (mmHg): 74  BP Location: Left arm  BP Method: Automatic  Patient Position - Orthostatic VS: Lying       Pain Pain Score: 0  Hospital Pain Intervention(s): Declines     GI   (WNL/X)  LBM  Incontinence (yes/no)     Gastrointestinal (WDL): Exceptions to WDL  Last BM Date: 07/13/25  Bowel Incontinence: No          (WNL/X)  Incontinence (yes/no)  Bladder Scan  Urine Output  Shift Total Output  Unmeasured Occurrence   Genitourinary (WDL): Within Defined Limits  Urinary Incontinence: No    Urine: 250 mL    Follow up/Interventions- Pt uses urinal overnight with no difficulty. Rings for bottle to be emptied.   Nutrition  Diet/Precautions   PO Intake  Meal Consumption   Nutrition  Feeding: Able to feed self  Diet Type: Diabetic  Appetite: Fair  P.O.: 240 mL  Percent Meals Eaten (%): 75       Skin   (WNL/X)  Integrity  Pressure Injury      Integumentary (WDL): Exceptions to WDL  Skin Integrity: Redness, Bruising  Description: Surgical incision to right foot.    Follow up/Interventions- Dressing intact. No complaints of pain or discomfort.    Behavior/Mood  Behavior log Patient Behaviors/Mood: Calm, Cooperative       Sleep Pattern  Sleep log     Pt stated he was restless overnight and woke up several times. Nurse offered to inquire about sleep aid and patient declined.    Discharge Planning Education  (Medication/Device/Injections/Etc)     Pt educated on importance of reporting s/s new onset of discomfort, pain, and illness.   ADL/Mobility Summary  (transfer, bed mobility, ambulation)   Pt ambulates with RW x1 with surgical shoe in place.

## 2025-07-14 NOTE — TELEPHONE ENCOUNTER
Wife  called in regarding her  appointment on the 28th , wife would like to know if her  can be seen on the 23rd as virtual if he is still at facility , if not patient would like to be seen at the [facility] office . Please advise

## 2025-07-14 NOTE — PROGRESS NOTES
"   07/14/25 2787   Pain Assessment   Pain Assessment Tool 0-10   Pain Score No Pain   Restrictions/Precautions   Precautions Bed/chair alarms;Fall Risk;Pain;Supervision on toilet/commode;Limb alert;Multiple lines   RLE Weight Bearing Per Order WBAT  (heel WB with surgical shoe)   ROM Restrictions No   Braces or Orthoses Other (Comment)  (RLE surgical shoe)   Upper Body Dressing   Type of Assistance Needed Set-up / clean-up   Physical Assistance Level No physical assistance   Upper Body Dressing CARE Score 5   Sit to Stand   Type of Assistance Needed Supervision   Physical Assistance Level No physical assistance   Comment RW   Sit to Stand CARE Score 4   Toileting Hygiene   Type of Assistance Needed Supervision   Physical Assistance Level No physical assistance   Toileting Hygiene CARE Score 4   Toileting   Able to Pull Clothing down yes, up yes.   Manage Hygiene Bladder   Limitations Noted In Balance;ROM;Safety;UE Strength;LE Strength   Adaptive Equipment Grab Bar   Toilet Transfer   Type of Assistance Needed Supervision   Physical Assistance Level No physical assistance   Toilet Transfer CARE Score 4   Toilet Transfer   Surface Assessed Raised Toilet   Transfer Technique Standard   Limitations Noted In Balance;Endurance;ROM;Safety;UE Strength;LE Strength   Adaptive Equipment Grab Bar;Walker   Commmunity Re-entry   Community Re-entry Level Other (Comment)  (Rollator)   Community Re-entry Level of Assistance Modified independent   Community Re-entry Pt reported he plans to use rollator when leaving the house in the future; plans to utilize seat when feeling tired out of the house.   Health Management   Health Management Level of Assistance Minimum assistance   Health Management Pt reports that his wife has all of his medications organzied in a specific \"pill drawer.\" She uses a daily pill organizer (morning, afternoon, and evening) for each day of the week. Pt reports he takes them himself from pill box that wife " pre-prepared. She refills the pill boxes every Friday and he takes them with each meal (breakfast/lunch/dinner). Pt reports most of their prescriptions are filled via mail order and they only  a few at the pharmacy.   Functional Standing Tolerance   Time 7m 41s   Activity Card sorting   Comments Fair dynamic standing balance with no support; a few episodes of LOB   Exercise Tools   Other Exercise Tool 1 Sanderbox 2x15 with 2# weight in all planes of motion   Other Exercise Tool 2 2# therabar 2x15 bilat UE strengthening shoulder elevation/depression, shoulder flexion/extension, chest presses, shoulder internal/external rotation, elbow flexion/extension   Cognition   Overall Cognitive Status WFL   Arousal/Participation Alert;Responsive;Cooperative   Attention Within functional limits   Orientation Level Oriented X4   Memory Decreased recall of precautions   Following Commands Follows all commands and directions without difficulty   Additional Activities   Additional Activities Other (Comment)   Additional Activities Comments Fxl mobility to and from OT room using RW with supervision   Assessment   Treatment Assessment Pt agreeable to OT session this PM; received sitting upright in recliner. ADLs, fxl transfers, and therex completed; current LOF and details listed in respective sections. Pt overall set-up UB dressing; supervision sit to stand transfer, toilet transfer, and toilet hygiene. Pt and OTS discussed some IADL routines for return home. Pt tolerated therex well with increased rest breaks throughout secondary to fatigue. Pt's barriers impacting performance include decreased strength, decreased ROM, decreased endurance, impaired balance, decreased safety awareness, decreased skin integrity, and pain. Pt to benefit from continued skilled OT services to address listed barriers and prepare for safe d/c home.   Prognosis Good   Problem List Decreased strength;Decreased range of motion;Decreased  endurance;Impaired balance;Pain;Decreased skin integrity;Decreased safety awareness   Plan   Treatment/Interventions ADL retraining;Functional transfer training;Therapeutic exercise;Endurance training;Patient/family training;Equipment eval/education;Compensatory technique education;OT   Progress Progressing toward goals   OT Therapy Minutes   OT Time In 1255   OT Time Out 1428   OT Total Time (minutes) 93   OT Mode of treatment - Individual (minutes) 93   OT Mode of treatment - Concurrent (minutes) 0   Therapy Time missed   Time missed? No

## 2025-07-14 NOTE — PROGRESS NOTES
Progress Note - Gold Van 79 y.o. male MRN: [id number]    Unit/Bed#: [facility] 215-01 Encounter: [id number]        Subjective:   Patient seen and examined at bedside after reviewing the chart and discussing the case with the caring staff.      Patient examined at bedside.  Patient feels he is improving every day.  Denies any acute symptoms.     Physical Exam   Vitals: Blood pressure 158/70, pulse 63, temperature (!) 96.4 °F (35.8 °C), temperature source Temporal, resp. rate 16, height 6' (1.829 m), weight 63.7 kg (140 lb 6.9 oz), SpO2 98%.,Body mass index is 19.05 kg/m².  Constitutional: Patient in no acute distress.  HEENT: PERR, EOMI, MMM.  Cardiovascular: Normal rate and regular rhythm.    Pulmonary/Chest: Effort normal and breath sounds normal.   Abdomen: Soft, + BS, NT.    Assessment/Plan:  Gold Van is a(n) 79 y.o. 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 Galeas.      Cardiac with hx of HTN, HLD, PAF, CHF, cardiomyopathy, orthostatic hypotension.  Continue atenolol 25 mg daily, lisinopril 5 mg daily (decr from 5mg on 7/10), atorvastatin 40 mg daily, Eliquis 5 mg twice daily, midodrine 2.5 mg twice daily (started 7/10).  Daily weights.  Apply abd binder and JOSEFA stockings.  T2DM.  Hgb A1c 6.6% on 6/18/25.  Home: Glucotrol XL 10 mg twice daily and metformin  mg daily.  Here: Lantus 12u nighty, Humalog 4u TID with meals, metformin  mg daily (restarted 7/6).  SSI with accuchecks ac/hs.  Carb controlled diet.    Hx CVA.  10/2024.  Continue Plavix, Eliquis and statin.  PAD.  S/p R CFA/SFA endarterectomy/stenting on 5/13/25.  Continue Plavix and statin.  GERD.  Patient is on famotidine 20 mg daily.    COPD/asthma.  Continue home Breztri (brought from home), Singulair 10 mg nightly, albuterol inhaler as needed.  CKD stage 2.  Stable.  Cr 0.77 -> 0.76 on 7/7.  Peak Cr 1.56.  Avoid nephrotoxins.  Cont to monitor.  Chronic generalized pruritus.  Continue prednisone 5 mg  daily.    Chronic hyponatremia.  Level 132 -> 132 on 7/14/25.  Asymptomatic.  Cont to trend.    Anemia.  Stable.  Hgb 9.2 -> 9.5 on 7/14.  Cont to trend.   Cough.  Ongoing x weeks.  CXR on 6/17 negative.  Start Mucinex 600 mg q12h, tessalon perles as needed 7/6.  Cont to monitor.   MSSA bacteremia.  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  Now s/p right TMA.  Patient is on IV cefepime 2g q8h for 6 weeks (thru 8/6/25).  RUE PICC placed 6/30 and to be removed by RN after final dose of antibiotics.  Weekly CBCD and creatinine while on IV abx.  Pain and bowel regimen.  As per physiatry.  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  OR cultures grew serratia, pseudomonas, MSSA.  Patient is on IV cefepime 2g q8h for 6 weeks (thru 8/6/25).  Saw podiatry 7/8, sutures removed, full heel weight bearing with surgical shoe.  He is receiving intensive PT OT with management as per physiatry.      Anticipated date of discharge.  Tuesday 7/22/25    The patient was discussed with Dr. Hendrickson and he is in agreement with the above note.

## 2025-07-14 NOTE — PLAN OF CARE
Problem: INFECTION - ADULT  Goal: Absence or prevention of progression during hospitalization  Description: INTERVENTIONS:  - Assess and monitor for signs and symptoms of infection  - Monitor lab/diagnostic results  - Monitor all insertion sites, i.e. indwelling lines, tubes, and drains    - [place] appropriate cooling/warming therapies per order  - Administer medications as ordered  - Instruct and encourage patient and family to use good hand hygiene technique  - Identify and instruct in appropriate isolation precautions for identified infection/condition  Outcome: Progressing

## 2025-07-14 NOTE — TELEPHONE ENCOUNTER
Conatcted patient to address a previous call made by patient's spouse regarding upcoming appointment date/time/location.    Spoke with patient's spouse, Mya. She expressed interest in having patient's appointment location changed to [place] as they'd have difficulties traveling to the [facility] office location. However, I informed Mya that we would be able to have visit changed to a Virtual Visit, and that we'd be able to connect through their phone so they wouldn't need to travel.    Mya verbalized understanding, and accepted appointment type change on patient's behalf at this time. Lastly, I advised her to call us anytime if she has any additional questions or concerns.

## 2025-07-15 ENCOUNTER — OFFICE VISIT (OUTPATIENT)
Facility: HOSPITAL | Age: 80
DRG: 559 | End: 2025-07-15
Payer: COMMERCIAL

## 2025-07-15 VITALS
SYSTOLIC BLOOD PRESSURE: 112 MMHG | HEART RATE: 76 BPM | DIASTOLIC BLOOD PRESSURE: 72 MMHG | RESPIRATION RATE: 18 BRPM | TEMPERATURE: 97 F

## 2025-07-15 DIAGNOSIS — T81.31XD DEHISCENCE OF OPERATIVE WOUND, SUBSEQUENT ENCOUNTER: Primary | ICD-10-CM

## 2025-07-15 DIAGNOSIS — E11.8 TYPE 2 DIABETES MELLITUS WITH COMPLICATION, WITHOUT LONG-TERM CURRENT USE OF INSULIN (HCC): Chronic | ICD-10-CM

## 2025-07-15 PROCEDURE — 11042 DBRDMT SUBQ TIS 1ST 20SQCM/<: CPT

## 2025-07-15 NOTE — CASE MANAGEMENT
Met with patient and spouse to give update on upcoming physician appointments.  Cardiology appointment for today is rescheduled until after DC.  Patient will attend weekly wound care appointment this week.  Medical team will make determination on ID appointment closer to DC.  Provided support for patient and wife and answered questions regarding DC.  Insurance update due tomorrow for concurrent review.  Will fax updated information for request for additional days.  Plan to continue to follow for DC needs/concerns.

## 2025-07-15 NOTE — PROGRESS NOTES
Wound Procedure Treatment Right Foot    Performed by: Corrine Malagon RN  Authorized by: Deniz Ennis DPM  Associated wounds:   Wound 05/16/25 Surgical Foot Right    Wound cleansed with:  Soap and water   Applied primary dressing:  Packing   Applied secondary dressing:  ABD   Dressing secured with:  Kerlix and Tape   Offloading device appllied:  Surgical shoe   Comments:  AMD packing

## 2025-07-15 NOTE — PROGRESS NOTES
"Patient ID: Gold Van is a 79 y.o. male Date of Birth 1945       Chief Complaint   Patient presents with    Follow Up Wound Care Visit     R. foot       Allergies:  Ciprofloxacin, Sulfamethoxazole, Trimethoprim, Dexamethasone, Triamcinolone, and Bactrim [sulfamethoxazole-trimethoprim]    Diagnosis:   Diagnosis ICD-10-CM Associated Orders   1. Dehiscence of operative wound, subsequent encounter  T81.31XD Wound cleansing and dressings Right Foot     Wound Procedure Treatment Right Foot     Debridement      2. Type 2 diabetes mellitus with complication, without long-term current use of insulin ([facility])  E11.8            Assessment  & Plan:    Patient presents for follow-up of right foot TMA incision site surgical wound dehiscence.  Dehiscence area appears smaller today compared to last visit, with 50% granular 50% fibrotic tissue.  No acute clinical signs of infection.  Surgical debridement performed, see procedure note  Continue wound care dressings as instructed and detailed below  Weightbearing to right heel as tolerated avoid heel-to-toe gait weightbearing as tolerated in surgical shoe  Obtain 3-4 servings of protein daily for wound healing.  Discussed resources for nutrition including \"myplate website\".  Discussed portion control and decreasing high starch foods, ultra processed foods.  Educated on A1C and the risks of poorly controlled Diabetes. Reviewed recent A1C:  Lab Results   Component Value Date    HGBA1C 6.6 (H) 06/18/2025    HGBA1C 8.3 (H) 04/14/2015          If the patient notices any systemic signs of infection including but not limited to fever, chills, nausea, vomiting or significant worsening of wound with increased drainage, redness or streaking up the foot or leg the patient should notify the office or present to the emergency room.      If wound debridement was completed today this was done in an attempt to promote healing, decrease risk of infection and for limb salvage efforts.    Goal " of treatment: wound healing     Efforts to decrease pressure on the wound(s), evaluation and discussion of nutritional status, peripheral vascular status, and infection control have all been addressed with this patient.      Subjective:   7/15/2025: patient presents for f/u right foot surgical wound dehiscence.  He reports he overall feels well today with no recent fever chills or malaise.  Is trying to eat more.          The following portions of the patient's history were reviewed and updated as appropriate:   Problem List[1]  Past Medical History[2]  Past Surgical History[3]  Family History[4]  Social History[5]  Current Medications[6]    Review of Systems      Objective:  /72   Pulse 76   Temp (!) 97 °F (36.1 °C)   Resp 18         Physical Exam      Wound 06/17/25 Pressure Injury Buttocks Mid;Right (Active)   Wound Image   07/15/25 1101   Wound Description Fragile;Pink 07/15/25 1101   Pressure Injury Stage DTPI 07/15/25 1101   Non-staged Wound Description Partial thickness 07/15/25 1101   Wound Length (cm) 0.5 cm 07/15/25 1101   Wound Width (cm) 0.3 cm 07/15/25 1101   Wound Depth (cm) 0.1 cm 07/15/25 1101   Wound Surface Area (cm^2) 0.12 cm^2 07/15/25 1101   Wound Volume (cm^3) 0.008 cm^3 07/15/25 1101   Calculated Wound Volume (cm^3) 0.02 cm^3 07/15/25 1101   Change in Wound Size % 94.74 07/15/25 1101   Drainage Amount Scant 07/15/25 1101   Drainage Description Serous 07/15/25 1101   Miranda-wound Assessment Dry;Intact;Fragile;Pink 07/15/25 1101   Treatments Cleansed;Site care;Irrigation with NSS 07/15/25 1101   Dressing Other (Comment) 07/15/25 1101   Wound packed? No 07/03/25 1101   Dressing Changed Changed 07/15/25 1101   Patient Tolerance Tolerated well 07/15/25 1101   Dressing Status Clean;Dry;Intact 07/14/25 2051       Wound 05/16/25 Surgical Foot Right (Active)   Wound Image   07/15/25 1446   Enter Milsl score: Mills Grade 2: Deep ulcer extended to ligament, tendon, joint capsule, bone, or deep  "fascia without abscess or osteomyelitis (OM) 06/10/25 1013   Wound Description Pink;Yellow;Tan 07/15/25 1435   Non-staged Wound Description Full thickness 06/10/25 1013   Wound Length (cm) 1.1 cm 07/15/25 1435   Wound Width (cm) 0.3 cm 07/15/25 1435   Wound Depth (cm) 0.9 cm 07/15/25 1435   Wound Surface Area (cm^2) 0.26 cm^2 07/15/25 1435   Wound Volume (cm^3) 0.156 cm^3 07/15/25 1435   Calculated Wound Volume (cm^3) 0.3 cm^3 07/15/25 1435   Change in Wound Size % 99.11 07/15/25 1435   Drainage Amount Moderate 07/15/25 1435   Drainage Description Serosanguineous 07/15/25 1435   Miranda-wound Assessment Clean;Dry;Intact 07/15/25 1435   Treatments Site care 07/10/25 1010   Wound Site Closure Sutures 07/15/25 0900   Dressing Dry dressing 07/15/25 0900   Wound packed? Yes 07/10/25 1010   Packing- # inserted 1 07/10/25 1010   Packing- # removed 1 07/10/25 1010   Dressing Changed Changed 07/10/25 1010   Patient Tolerance Tolerated well 07/10/25 1010   Dressing Status Clean;Dry;Intact 07/15/25 1435         Debridement     Date/Time: 7/15/2025 2:15 PM    Universal Protocol:  Consent: Verbal consent obtained  Risks and benefits: risks, benefits and alternatives were discussed  Consent given by: patient  Time out: Immediately prior to procedure a \"time out\" was called to verify the correct patient, procedure, equipment, support staff and site/side marked as required.  Timeout called at: 7/15/2025 3:04 PM.  Patient understanding: patient states understanding of the procedure being performed  Patient identity confirmed: verbally with patient    Debridement Details  Performed by: physician  Debridement type: surgical  Level of debridement: subcutaneous tissue  Pain control: lidocaine 4%    Post-debridement measurements  Length (cm): 1.1  Width (cm): 0.3  Depth (cm): 0.9  Percent debrided: 100%  Surface Area (cm^2): 0.26  Area Debrided (cm^2): 0.26  Volume (cm^3): 0.16    Tissue and other material debrided: subcutaneous " "tissue  Instrument(s) utilized: curette  Bleeding: medium  Hemostasis obtained with: pressure  Response to treatment: procedure was tolerated well           Results from last 6 Months   Lab Units 06/23/25  1454   WOUND CULTURE  3 colonies Serratia marcescens*  3 colonies Pseudomonas aeruginosa*  2 colonies Staphylococcus aureus*  2 colonies  2+ Growth of Staphylococcus aureus*           Wound Instructions:  Orders Placed This Encounter   Procedures    Wound cleansing and dressings Right Foot     Right foot surgical      Wash your hands with soap and water.  Remove old dressing, discard into plastic bag and place in trash.  Cleanse the wound with vashe wound cleanser if available (if not available may cleanse with unscented soap and water)  prior to applying a clean dressing. Do not use tissue or cotton balls. Do not scrub the wound. Pat dry using gauze.  Shower no      Apply AMD packing lightly pack into the foot  wound.  Cover with ABD pad   Secure with becki and tape   Change dressing every other day    Surgical shoe to right foot   May ambulate only in surgical shoe      Increase protein in your diet with each meal. 3 to 4 servings per day Meat, chicken, eggs, nut, greek yogurt, legumes, and  lentils are good sources of protein. Isaac protein shakes          Follow up in 1 week     Standing Status:   Future     Expiration Date:   7/22/2025    Wound Procedure Treatment Right Foot     This order was created via procedure documentation    Debridement     This order was created via procedure documentation       Deniz Ennis DPM          Portions of the record may have been created with voice recognition software. Occasional wrong word or \"sound alike\" substitutions may have occurred due to the inherent limitations of voice recognition software. Read the chart carefully and recognize, using context, where substitutions have occurred.         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    Non-recurrent bilateral inguinal hernia without obstruction or gangrene    Pruritic condition    History of pneumothorax    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HCC)    CVA (cerebrovascular accident) ([facility]ly [facility])    Gastroesophageal reflux disease without esophagitis    Neuropathy    Foot drop, left    Severe protein-calorie malnutrition (HCC)    Severe peripheral arterial disease (HCC)    Ulcer of right foot with fat layer exposed secondary to osteomyelitis    PAF (paroxysmal atrial fibrillation) ([facility])    Chronic heart failure with preserved ejection fraction (HFpEF) (HCC)    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 ([facility])    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 ([facility])    Septic shock ([facility])    Pulmonary hypertension (HCC)    Anemia    Osteomyelitis ([facility])    Pressure injury of skin of sacral region    Metabolic acidosis    Ambulatory dysfunction    MSSA bacteremia    Osteomyelitis of right foot ([facility])    Allergy to antibiotic    S/P transmetatarsal amputation of foot, right ([facility])    Acute pain    At risk for venous thromboembolism (VTE)    Pressure injury of right heel, stage 1    Multiple open wounds of lower leg    Dermatitis associated with moisture    Orthostatic hypotension   [2]   Past Medical History:  Diagnosis Date    Asthma     Atrial fibrillation (HCC)     COPD (chronic obstructive pulmonary disease) ([facility])     COVID-19     CVA (cerebral vascular accident) ([facility])     CVA (cerebral vascular accident) ([facility])     Diabetes mellitus ([facility])     Environmental allergies     Hyperlipidemia     Hypertension     Macular degeneration 07/13/2020    right eye    Orthostatic hypotension     Osteopenia     Pneumonia     Pneumothorax      Sinusitis    [3]   Past Surgical History:  Procedure Laterality Date    CARPAL TUNNEL RELEASE Left 08/2024    CATARACT EXTRACTION Left 07/2024    COLONOSCOPY      IR PICC PLACEMENT SINGLE LUMEN  6/30/2025    KNEE CARTILAGE SURGERY Right 1964    NJ AMPUTATION FOOT TRANSMETARSAL Right 6/23/2025    Procedure: AMPUTATION TRANSMETATARSAL (TMA);  Surgeon: Agustin Galeas DPM;  Location: [facility];  Service: Podiatry    NJ AMPUTATION METATARSAL W/TOE SINGLE Right 05/16/2025    Procedure: RAY RESECTION FOOT - R partial 5th ray resection;  Surgeon: Reinaldo Brewer DPM;  Location: [facility];  Service: Podiatry    NJ AMPUTATION METATARSAL W/TOE SINGLE Right 05/23/2025    Procedure: Right partial 4th ray amputation, wound debridement;  Surgeon: Brandon Phillips DPM;  Location: [facility];  Service: Podiatry    NJ TEAEC W/WO PATCH GRAFT COMMON FEMORAL Right 05/13/2025    Procedure: Right common femoral endarterectomy with bovine pericardial patch Right lower extremity angiogram Third order cannulation of the right anterior tibial artery Balloon angioplasty of the right anterior tibial with a 3x220 Fernando balloon DCB angioplasty of the SFA with a 6x150 [employer] balloon Stenting of the right SFA with two 6x150 Grazyna stents, 6x5cm Viabahn, and a 7x5cm viabahn Post-di    VASECTOMY      WISDOM TOOTH EXTRACTION      x4   [4]   Family History  Problem Relation Name Age of Onset    Asthma Mother      Emphysema Mother      Cancer Father      Asthma Brother      Cancer Brother     [5]   Social History  Socioeconomic History    Marital status: /Civil Union   Tobacco Use    Smoking status: Former    Smokeless tobacco: Never    Tobacco comments:     quit about 25 years ago   Vaping Use    Vaping status: Never Used   Substance and Sexual Activity    Alcohol use: Not Currently    Drug use: Never    Sexual activity: Not Currently     Partners: Female   Social History Narrative    Daily caffeine use- 1 cup of tea, 2 cups of  coffee     Social Drivers of Health     Food Insecurity: No Food Insecurity (7/5/2025)    Nursing - Inadequate Food Risk Classification     Worried About Running Out of Food in the Last Year: Never true     Ran Out of Food in the Last Year: Never true     Ran Out of Food in the Last Year: Never true   Transportation Needs: No Transportation Needs (7/5/2025)    Nursing - Transportation Risk Classification     Lack of Transportation: No    Received from [employer]   Intimate Partner Violence: Unknown (7/5/2025)    Nursing IPS     Physically Hurt by Someone: No     Hurt or Threatened by Someone: No   Housing Stability: Unknown (7/5/2025)    Nursing: Inadequate Housing Risk Classification     Unable to Pay for Housing in the Last Year: No     Has Housing: No   [6] No current facility-administered medications for this visit.  No current outpatient medications on file.    Facility-Administered Medications Ordered in Other Visits:     acetaminophen (TYLENOL) tablet 650 mg, 650 mg, Oral, Q6H PRN, Annie Martines, PA-C    albuterol (PROVENTIL HFA,VENTOLIN HFA) inhaler 2 puff, 2 puff, Inhalation, Q4H PRN, Annie Martines, PA-C, 2 puff at 07/06/25 1421    apixaban (ELIQUIS) tablet 5 mg, 5 mg, Oral, BID, Annie Izquierdol, PA-C, 5 mg at 07/15/25 1706    atenolol (TENORMIN) tablet 25 mg, 25 mg, Oral, Daily, Annie L Dagnall, PA-C, 25 mg at 07/15/25 0847    atorvastatin (LIPITOR) tablet 40 mg, 40 mg, Oral, QPM, Annie STEPHANIE Dagnall, PA-C, 40 mg at 07/15/25 1706    benzonatate (TESSALON PERLES) capsule 200 mg, 200 mg, Oral, TID PRN, Annie L Dagnall, PA-C, 200 mg at 07/15/25 2141    Budeson-Glycopyrrol-Formoterol 160-9-4.8 MCG/ACT AERO 2 puff, 2 puff, Inhalation, Q12H SANDRA, Ilana Payne PA-C, 2 puff at 07/16/25 0541    ceFEPime (MAXIPIME) 2,000 mg in dextrose 5 % 50 mL IVPB, 2,000 mg, Intravenous, Q8H, Annie Martines PA-C, Last Rate: 100 mL/hr at 07/16/25 0541, 2,000 mg at 07/16/25 0541     clopidogrel (PLAVIX) tablet 75 mg, 75 mg, Oral, Daily, Annie BROWN Dagnall, PA-C, 75 mg at 07/15/25 0846    docusate sodium (COLACE) capsule 100 mg, 100 mg, Oral, Daily, Ashley Depadua, MD, 100 mg at 07/07/25 0931    famotidine (PEPCID) tablet 20 mg, 20 mg, Oral, Daily, Annie L Dagnall, PA-C, 20 mg at 07/15/25 0847    glipiZIDE (GLUCOTROL XL) 24 hr tablet 5 mg, 5 mg, Oral, BID With Meals, Annie L Dagnall, PA-C, 5 mg at 07/15/25 1707    guaiFENesin (MUCINEX) 12 hr tablet 600 mg, 600 mg, Oral, Q12H SANDRA, Annie STEPHANIE Dagnall, PA-C, 600 mg at 07/15/25 2141    insulin glargine (LANTUS) subcutaneous injection 12 Units 0.12 mL, 12 Units, Subcutaneous, HS, Annie BROWN Dagnall, PA-C, 12 Units at 07/15/25 2142    insulin lispro (HumALOG/ADMELOG) 100 units/mL subcutaneous injection 1-5 Units, 1-5 Units, Subcutaneous, 4x Daily (with meals and at bedtime), 1 Units at 07/15/25 2142 **AND** Fingerstick Glucose (POCT), , , 4x Daily AC and at bedtime, Annie BROWN Dagnall, PA-C    lisinopril (ZESTRIL) tablet 2.5 mg, 2.5 mg, Oral, Daily, Annie L Dagnall, PA-C, 2.5 mg at 07/15/25 0846    metFORMIN (GLUCOPHAGE-XR) 24 hr tablet 500 mg, 500 mg, Oral, Daily With Dinner, Annie L Dagnall, PA-C, 500 mg at 07/15/25 1706    midodrine (PROAMATINE) tablet 2.5 mg, 2.5 mg, Oral, BID AC, Julianna Snyder MD, 2.5 mg at 07/15/25 1111    montelukast (SINGULAIR) tablet 10 mg, 10 mg, Oral, HS, Annie L Dagnall, PA-C, 10 mg at 07/15/25 2141    multivitamin-minerals (CENTRUM) tablet 1 tablet, 1 tablet, Oral, Daily, Annie STEPHANIE Dagnall, PA-C, 1 tablet at 07/15/25 0845    ondansetron (ZOFRAN-ODT) dispersible tablet 4 mg, 4 mg, Oral, Q6H PRN, Annie BROWN Dagnall, PA-C    oxyCODONE (ROXICODONE) split tablet 2.5 mg, 2.5 mg, Oral, Q4H PRN **OR** oxyCODONE (ROXICODONE) IR tablet 5 mg, 5 mg, Oral, Q4H PRN, Annie L Dagnall, PA-C    polyethylene glycol (MIRALAX) packet 17 g, 17 g, Oral, Daily PRN, Annie L Dagnall, PA-C    predniSONE tablet 10 mg, 10 mg, Oral, Every  Other Day, Binh Hurd DO, 10 mg at 07/15/25 0847    predniSONE tablet 5 mg, 5 mg, Oral, Every Other Day, Binh Hurd DO    sodium chloride (OCEAN) 0.65 % nasal spray 2 spray, 2 spray, Each Nare, Q1H PRN, Annie Martines PA-C

## 2025-07-16 ENCOUNTER — HOME HEALTH ADMISSION (OUTPATIENT)
Dept: HOME HEALTH SERVICES | Facility: HOME HEALTHCARE | Age: 80
End: 2025-07-16
Payer: COMMERCIAL

## 2025-07-16 NOTE — PROGRESS NOTES
Progress Note - Gold Van 79 y.o. male MRN: [id number]    Unit/Bed#: [facility] 215-01 Encounter: [id number]        Subjective:   Patient seen and examined at bedside after reviewing the chart and discussing the case with the caring staff.      Patient examined at bedside.  Patient denies any acute symptoms.  His dizziness is minimal now since starting midodrine.  This morning his blood sugar was 50 which improved to 106 after breakfast.  Home glipizide was restarted yesterday.     Physical Exam   Vitals: Blood pressure 120/60, pulse 75, temperature 97.6 °F (36.4 °C), temperature source Temporal, resp. rate 16, height 6' (1.829 m), weight 63.7 kg (140 lb 6.9 oz), SpO2 100%.,Body mass index is 19.05 kg/m².  Constitutional: Patient in no acute distress.  HEENT: PERR, EOMI, MMM.  Cardiovascular: Normal rate and regular rhythm.    Pulmonary/Chest: Effort normal and breath sounds normal.   Abdomen: Soft, + BS, NT.    Assessment/Plan:  Gold Van is a(n) 79 y.o. 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 Galeas.      Cardiac with hx of HTN, HLD, PAF, CHF, cardiomyopathy, orthostatic hypotension.  Continue atenolol 25 mg daily, lisinopril 5 mg daily (decr from 5mg on 7/10), atorvastatin 40 mg daily, Eliquis 5 mg twice daily, midodrine 2.5 mg twice daily (started 7/10).  Daily weights.  Apply abd binder and JOSEFA stockings.  T2DM.  Hgb A1c 6.6% on 6/18/25.  Home: Glucotrol XL 10 mg twice daily and metformin  mg daily.  Here: metformin  mg daily (restarted 7/6) and Glucotrol XL 5mg twice daily (restarted 7/15).  D/c Humalog 4u TID on 7/15, d/c Lantus 12u nightly on 7/16.  SSI with accuchecks ac/hs.  Carb controlled diet.    Hx CVA.  10/2024.  Continue Plavix, Eliquis and statin.  PAD.  S/p R CFA/SFA endarterectomy/stenting on 5/13/25.  Continue Plavix and statin.  GERD.  Patient is on famotidine 20 mg daily.    COPD/asthma.  Continue home Breztri (brought from home), Singulair 10  mg nightly, albuterol inhaler as needed.  CKD stage 2.  Stable.  Cr 0.77 -> 0.76 -> 0.79 on 7/14.  Peak Cr 1.56.  Avoid nephrotoxins.  Cont to monitor.  Chronic generalized pruritus.  Continue prednisone 5 mg daily.    Chronic hyponatremia.  Level 132 -> 132 on 7/14.  Asymptomatic.  Cont to trend.    Anemia.  Stable.  Hgb 9.2 -> 9.5 on 7/14.  Cont to trend.   Cough.  Ongoing x weeks.  CXR on 6/17 negative.  Start Mucinex 600 mg q12h, tessalon perles as needed 7/6.  Cont to monitor.   MSSA bacteremia.  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  Now s/p right TMA.  Patient is on IV cefepime 2g q8h for 6 weeks (thru 8/6/25).  RUE PICC placed 6/30 and to be removed by RN after final dose of antibiotics.  Weekly CBCD and creatinine while on IV abx.  Pain and bowel regimen.  As per physiatry.  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  OR cultures grew serratia, pseudomonas, MSSA.  Patient is on IV cefepime 2g q8h for 6 weeks (thru 8/6/25).  Saw podiatry 7/8, sutures removed, full heel weight bearing with surgical shoe.  Saw podiatry 7/16 for surgical debridement.  He is receiving intensive PT OT with management as per physiatry.      Anticipated date of discharge.  Tuesday 7/22/25    The patient was discussed with Dr. Hendrickson and he is in agreement with the above note.

## 2025-07-16 NOTE — NURSING NOTE
Patient's fasting bg this am 50. Patient ate breakfast and drank OJ. Denied s/s of hypoglycemia. BG on recheck 1-6. Medicine notified and aware. Patient presently in PT gym for session, no complaints at present time. Will continue to monitor.

## 2025-07-16 NOTE — PROGRESS NOTES
07/16/25 0900   Pain Assessment   Pain Assessment Tool 0-10   Pain Score No Pain   Restrictions/Precautions   Precautions Bed/chair alarms;Fall Risk;Pain;Multiple lines;Limb alert   RLE Weight Bearing Per Order WBAT  (Heel WBing right LE)   Cognition   Overall Cognitive Status WFL   Subjective   Subjective Pt reports he is doing well.   Roll Left and Right   Comment Pt OOB   Sit to Lying   Comment Pt OOB   Lying to Sitting on Side of Bed   Comment Pt OOB   Sit to Stand   Type of Assistance Needed Supervision   Physical Assistance Level No physical assistance   Comment Cl S, RW   Sit to Stand CARE Score 4   Bed-Chair Transfer   Type of Assistance Needed Supervision   Physical Assistance Level No physical assistance   Comment Cl S, RW   Chair/Bed-to-Chair Transfer CARE Score 4   Car Transfer   Comment Family requests tomorrow   Reason if not Attempted Environmental limitations   Car Transfer CARE Score 10   Walk 10 Feet   Type of Assistance Needed Incidental touching   Physical Assistance Level No physical assistance   Comment RW, Cl S   Walk 10 Feet CARE Score 4   Walk 50 Feet with Two Turns   Type of Assistance Needed Incidental touching   Physical Assistance Level No physical assistance   Comment RW, Cl S   Walk 50 Feet with Two Turns CARE Score 4   Walk 150 Feet   Type of Assistance Needed Incidental touching   Physical Assistance Level No physical assistance   Comment Cl S/CGA, RW   Walk 150 Feet CARE Score 4   Walking 10 Feet on Uneven Surfaces   Type of Assistance Needed Incidental touching   Physical Assistance Level No physical assistance   Comment CGA, RW, green foam   Walking 10 Feet on Uneven Surfaces CARE Score 4   Ambulation   Primary Mode of Locomotion Prior to Admission Walk   Distance Walked (feet) 155 ft  (155 ft x 2, 35 ft x 2)   Assist Device Roller Walker   Does the patient walk? 2. Yes   Curb or Single Stair   Style negotiated Single stair   Type of Assistance Needed Incidental touching    Physical Assistance Level No physical assistance   Comment B/L HRs, repeat steps up x 10   1 Step (Curb) CARE Score 4   4 Steps   Type of Assistance Needed Incidental touching   Physical Assistance Level No physical assistance   Comment B/L HRs, 7 steps x 2, step to vs step over step pattern   4 Steps CARE Score 4   12 Steps   Type of Assistance Needed Incidental touching   Physical Assistance Level No physical assistance   Comment B/L HRs   12 Steps CARE Score 4   Picking Up Object   Reason if not Attempted Safety concerns   Picking Up Object CARE Score 88   Toilet Transfer   Comment Not required during sessoin   Therapeutic Interventions   Strengthening Seated LE TE, 2# B/L   Other Endurance training for repeat step ups   Other Comments   Comments BP stable throughout session 122/62,  with activity   Assessment   Treatment Assessment Pt presents agreeable ot session.  Continues to require intermittent rest periods during session.  Discussed car transfers with spouse.  May need alternative training for FF of stairs due to intermittent weakness/endurance limitations.  He is in need of ongoing interventions.  Consider obstacle course negotiation as progression to gait training next sessions.  Cont per POC.   Problem List Decreased strength;Decreased range of motion;Decreased endurance;Impaired balance;Pain;Orthopedic restrictions;Decreased skin integrity;Decreased safety awareness   PT Barriers   Physical Impairment Decreased strength;Decreased endurance;Impaired balance;Decreased mobility;Decreased coordination;Orthopedic restrictions;Pain   Functional Limitation Car transfers;Ramp negotiation;Stair negotiation;Standing;Transfers;Walking;Wheelchair management   Plan   Treatment/Interventions ADL retraining;Functional transfer training;LE strengthening/ROM;Elevations;Therapeutic exercise;Endurance training;Patient/family training;Equipment eval/education;Bed mobility;Gait training   Progress Progressing  toward goals   PT Therapy Minutes   PT Time In 0900   PT Time Out 1030   PT Total Time (minutes) 90   PT Mode of treatment - Individual (minutes) 90   PT Mode of treatment - Concurrent (minutes) 0   PT Mode of treatment - Group (minutes) 0   PT Mode of treatment - Co-treat (minutes) 0   PT Mode of Treatment - Total time(minutes) 90 minutes   PT Cumulative Minutes 701     Seated LE TE:  3x10, B/L LEs, 2#  March  LAQ  Hip ABd - green T-band  Hip ADd - Nate  GS  HR  TR  HS Curls - green T-band    Patient remains OOB in chair, all needs in reach.  Alarm in place and activated.  Encouraged use of call bell, patient verbalizes understanding.

## 2025-07-16 NOTE — NURSING NOTE
[facility]  RN Shift Note        Vitals  Vital Signs  Temperature: (!) 97.3 °F (36.3 °C)  Temp Source: Temporal  Pulse: 85  Heart Rate Source: Monitor  Respirations: 16  Blood Pressure: 118/57  MAP (mmHg): 100  BP Location: Left arm  BP Method: Automatic  Patient Position - Orthostatic VS: Lying        Follow up/Interventions- Daily orthostatic VS   Pain Pain Score: 0  Hospital Pain Intervention(s): Medication (See MAR)    Follow up/Interventions-    GI   (WNL/X)  LBM  Incontinence (yes/no)     Gastrointestinal (WDL): Within Defined Limits    Bowel Incontinence: No    Follow up/Interventions-       (WNL/X)  Incontinence (yes/no)  Bladder Scan  Urine Output  Shift Total Output  Unmeasured Occurrence   Genitourinary (WDL): Within Defined Limits  Urinary Incontinence: No    Urine: 300 mL    Unmeasured Urine Occurrence: 1      Follow up/Interventions-    Nutrition  Diet/Precautions   PO Intake  Meal Consumption   Nutrition  Feeding: Able to feed self  Diet Type: Diabetic  Appetite: Good  P.O.: 240 mL  Percent Meals Eaten (%): 100      Strategies/Interventions-   Follow up- BG 50- ate breakfast, increased to 106. Medicine notified and pm Lantus dc'd    LDA  Drain Output             Follow up/Interventions-    Skin   (WNL/X)  Integrity  Pressure Injury YES/NO: no     Integumentary (WDL): Exceptions to WDL  Skin Integrity: Bruising  Description Right TMA site     Follow up/Interventions- dressing changed 7/15 by wound care center- due to change 7/17   Behavior/Mood  Behavior log YES/NO: no Patient Behaviors/Mood: Calm, Cooperative    Follow up/Interventions-    Sleep Pattern  Sleep log no     no   Discharge Planning Education  (Medication/Device/Injections/Etc)     Patient's wife to have education on practice flushing RUE PICC line, change dressing to right TMA site   ADL/Mobility Summary  (transfer, bed mobility, ambulation)   Supervision spt from wc to bed      Miscellaneous

## 2025-07-16 NOTE — PROGRESS NOTES
07/16/25 1230   Pain Assessment   Pain Assessment Tool 0-10   Pain Score No Pain   Restrictions/Precautions   Precautions Bed/chair alarms;Fall Risk;Pain;Multiple lines;Limb alert   RLE Weight Bearing Per Order WBAT  (through heel with sx shoe)   Sit to Lying   Type of Assistance Needed Supervision   Physical Assistance Level No physical assistance   Sit to Lying CARE Score 4   Sit to Stand   Type of Assistance Needed Supervision   Physical Assistance Level No physical assistance   Comment RW   Sit to Stand CARE Score 4   Bed-Chair Transfer   Type of Assistance Needed Supervision   Physical Assistance Level No physical assistance   Comment RW   Chair/Bed-to-Chair Transfer CARE Score 4   Transfer Bed/Chair/Wheelchair   Limitations Noted In Balance;Endurance;Pain Management;UE Strength;LE Strength   Adaptive Equipment Roller Walker   Exercise Tools   Theraputty Red mod resistance theraputty 15 small item retrieval using BUEs; focus on bilat FMC   Other Exercise Tool 1 2# therabar 2x15 bilat UE strengthening shoulder elevation/depression, shoulder flexion/extension, chest presses, shoulder internal/external rotation, circumduction, elbow flexion/extension, wrist flexion/extension   Other Exercise Tool 2 BUEs to comlete blue pegboard puzzle; promoting bilateral reaching, FMC, and dynamic unsupported sitting balance   Cognition   Overall Cognitive Status WFL   Arousal/Participation Alert;Responsive;Cooperative   Attention Within functional limits   Orientation Level Oriented X4   Memory Decreased recall of precautions   Following Commands Follows one step commands without difficulty   Activity Tolerance   Activity Tolerance Patient limited by fatigue;Patient limited by pain   Other Comments   Assessment Pt participated in 72 minutes concurrent tx with another pt with similar goals.   Assessment   Treatment Assessment Pt agreeable to OT session this PM; received sitting upright in recliner. Therex/theract and fxl  transfers session completed; current LOF and details listed in respective sections. Pt overall supervision for transers. Pt reported he was experiencing fatigue and SOB earlier in the day and preferred to complete OT session in his room. Pt extremely fatigued with c/o various pains throughout session with many rest breaks required to complete each task. Pt with good dynamic unsupported sitting balance during peg board activity. Pt preferred to end session early due to increased fatigue and lower tolerance than usual. Pt's barriers impacting performance include decreased strength, decreased ROM, decreased endurance, impaired balance, decreased safety awareness, decreased skin integrity, and pain. Pt to benefit from continued skilled OT services to address listed barriers and prepare for safe d/c home.   Prognosis Good   Problem List Decreased strength;Decreased range of motion;Decreased endurance;Impaired balance;Pain;Orthopedic restrictions;Decreased skin integrity;Decreased safety awareness   Plan   Treatment/Interventions ADL retraining;Functional transfer training;Therapeutic exercise;Endurance training;Patient/family training;Equipment eval/education;Compensatory technique education;OT   Progress Progressing toward goals   OT Therapy Minutes   OT Time In 1230   OT Time Out 1342   OT Total Time (minutes) 72   OT Mode of treatment - Individual (minutes) 0   OT Mode of treatment - Concurrent (minutes) 72   Therapy Time missed   Time missed? No

## 2025-07-16 NOTE — ASSESSMENT & PLAN NOTE
POA to [facility]  > Continue local wound care to pretibial wounds  Skin Care orders 7/15:  1-Hydraguard to Left heels 2 times per day and as needed    2-EHOB/WAFFLE or Roho  cushion when out of bed in chair.  3-Moisturize skin daily with skin nourishing cream  4-Elevate heels to offload pressure. EHOB off loading boots when in bed   5-Turn/reposition every 2 hours to offload and prevent pressure   6. Apply silicone foam to the right heel ramone with a P an ddate peel and check skin integrity every shift change every 3 days   7. Cleanse sacrum, buttocks with soap and water apply triad paste to the wound on the right buttocks only then a silicone foam ramone with a T and date check skin integrity every shift and change every other day or when soiled   8. P 500 low air loss mattress   9. Dr. 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

## 2025-07-16 NOTE — PLAN OF CARE
Problem: PAIN - ADULT  Goal: Verbalizes/displays adequate comfort level or baseline comfort level  Description: Interventions:  - Encourage patient to monitor pain and request assistance  - Assess pain using appropriate pain scale  - Administer analgesics as ordered based on type and severity of pain and evaluate response  - Implement non-pharmacological measures as appropriate and evaluate response  - Consider cultural and social influences on pain and pain management  - Notify physician/advanced practitioner if interventions unsuccessful or patient reports new pain  - Educate patient/family on pain management process including their role and importance of  reporting pain   - Provide non-pharmacologic/complimentary pain relief interventions  Outcome: Progressing     Problem: INFECTION - ADULT  Goal: Absence or prevention of progression during hospitalization  Description: INTERVENTIONS:  - Assess and monitor for signs and symptoms of infection  - Monitor lab/diagnostic results    - Dixon appropriate cooling/warming therapies per order  - Administer medications as ordered  - Instruct and encourage patient and family to use good hand hygiene technique  - Identify and instruct in appropriate isolation precautions for identified infection/condition  Outcome: Progressing     Problem: SAFETY ADULT  Goal: Patient will remain free of falls  Description: INTERVENTIONS:  - Educate patient/family on patient safety including physical limitations  - Instruct patient to call for assistance with activity   - OT/PT to assist with strengthening/mobility   - Keep Call bell within reach  - Keep bed low and locked with side rails adjusted as appropriate  - Keep care items and personal belongings within reach  - Initiate and maintain comfort rounds  - Make Fall Risk Sign visible to staff    - Apply yellow socks and bracelet for high fall risk patients  - Consider moving patient to room near nurses station  Outcome: Progressing      Problem: Prexisting or High Potential for Compromised Skin Integrity  Goal: Skin integrity is maintained or improved  Description: INTERVENTIONS:  - Identify patients at risk for skin breakdown  - Assess and monitor skin integrity including under and around medical devices   - Assess and monitor nutrition and hydration status  - Monitor labs  - Assess for incontinence   - Turn and reposition patient  - Assist with mobility/ambulation  - Relieve pressure over joycelyn prominences   - Avoid friction and shearing  - Provide appropriate hygiene as needed including keeping skin clean and dry  - Evaluate need for skin moisturizer/barrier cream  - Collaborate with interdisciplinary team  - Patient/family teaching  - Consider wound care consult    Assess:  - Review Sae scale daily  - Clean and moisturize skin every day   - Inspect skin when repositioning, toileting, and assisting with ADLS  - Assess extremities for adequate circulation and sensation     Bed Management:  - Have minimal linens on bed & keep smooth, unwrinkled  - Change linens as needed when moist or perspiring  - Toileting:  - Offer bedside commode    Activity:  - Mobilize patient 3 times a day  - Encourage activity and walks on unit  - Encourage or provide ROM exercises   - Turn and reposition patient every 2 Hours  - Use appropriate equipment to lift or move patient in bed  - Instruct/ Assist with weight shifting every hour  when out of bed in chair  - Consider limitation of chair time 3 hour intervals    Skin Care:  - Avoid use of baby powder, tape, friction and shearing, hot water or constrictive clothing  - Do not massage red bony areas    Next Steps:    Outcome: Progressing     Problem: Nutrition/Hydration-ADULT  Goal: Nutrient/Hydration intake appropriate for improving, restoring or maintaining nutritional needs  Description: Monitor and assess patient's nutrition/hydration status for malnutrition. Collaborate with interdisciplinary team and initiate  plan and interventions as ordered.  Monitor patient's weight and dietary intake as ordered or per policy. Utilize nutrition screening tool and intervene as necessary. Determine patient's food preferences and provide high-protein, high-caloric foods as appropriate.     INTERVENTIONS:  - Monitor oral intake, urinary output, labs, and treatment plans  - Assess nutrition and hydration status and recommend course of action  - Evaluate amount of meals eaten  - Assist patient with eating if necessary   - Allow adequate time for meals  - Recommend/ encourage appropriate diets, oral nutritional supplements, and vitamin/mineral supplements  - Order, calculate, and assess calorie counts as needed  - Recommend, monitor, and adjust tube feedings and TPN/PPN based on assessed needs  - Assess need for intravenous fluids  - Provide specific nutrition/hydration education as appropriate  - Include patient/family/caregiver in decisions related to nutrition  Outcome: Progressing

## 2025-07-16 NOTE — NURSING NOTE
Pt c/o feeling lightheaded and very dizzy while oob in wc. BG was 157. Assisted into reclining position lying and legs elevated in recliner chair. /57, HR 85.Also c/o tightness when breathing, 02 sat 100% on RA. Dizziness improving in lying position in recliner. PM+R and medicine notified. In to see and evaluate patient at bedside. Will follow.

## 2025-07-16 NOTE — ASSESSMENT & PLAN NOTE
Wt Readings from Last 3 Encounters:   07/16/25 63.7 kg (140 lb 6.9 oz)   06/26/25 62.1 kg (137 lb)   06/10/25 67.1 kg (148 lb)   6/26 Echo with EF 50-55%, Diastolic function WNL  Follows with Dr. Nelson  Home: patient declined diuretic regimen, is on Lisinopril 5mg daily  > Here: Lisinopril 5mg daily  > Monitor volume status, lytes  > I/O, daily weights.  > Management as per IM

## 2025-07-16 NOTE — ASSESSMENT & PLAN NOTE
2/2 nonhealing wound, presented in septic shock.  S/p TMA on 6/23 by Dr. Galeas  Unclear if source control  OR Cultures: Serratia, MSSA, Pseudomonas  Blood cultures 6/26 with NGTD  > Per ID continue Cefepime through 8/6 for 6 weeks treatment  > PICC placed on 6/30  > Monitor labs at least weekly for toxicities  > Pain management  > Close incisional monitoring and care.  > NWB RLE  > PT/OT 3-5 hours/day, 5-7 days/week  > f/u Podiatry ~ 7/22 9:00 a   Patient f/u w/ Dr. Galeas in 7/8: sutures removed; will need weekly debridement; full heel weight bearing and avoid heel - to toe gait in surgical shoe    7/15: improved healing at dehiscence area; surgical debridement performed  > outpatient f/u with ID   - 7/22 Home PT/OT/RN

## 2025-07-16 NOTE — NURSING NOTE
[facility]  RN Shift Note        Vitals  Vital Signs  Temperature: (!) 97.3 °F (36.3 °C)  Temp Source: Temporal  Pulse: 85  Heart Rate Source: Monitor  Respirations: 16  Blood Pressure: 118/57  MAP (mmHg): 100  BP Location: Left arm  BP Method: Automatic  Patient Position - Orthostatic VS: Lying           Pain Pain Score: 0  Hospital Pain Intervention(s): Medication (See MAR)    Reported no pain    GI   (WNL/X)  LBM  Incontinence (yes/no)     Gastrointestinal (WDL): Within Defined Limits  Last BM Date: 07/16/25  Bowel Incontinence: No          (WNL/X)  Incontinence (yes/no)  Bladder Scan  Urine Output  Shift Total Output  Unmeasured Occurrence   Genitourinary (WDL): Within Defined Limits  Urinary Incontinence: No    Urine: 300 mL    Unmeasured Urine Occurrence: 1         Nutrition  Diet/Precautions   PO Intake  Meal Consumption   Nutrition  Feeding: Able to feed self  Diet Type: Diabetic  Appetite: Good  P.O.: 140 mL  Percent Meals Eaten (%): 100         LDA  Drain Output       N/a         Skin   (WNL/X)  Integrity  Pressure Injury      Integumentary (WDL): Exceptions to WDL  Skin Integrity: Bruising  Description     Wound care due tomorrow 7/16   Behavior/Mood  Behavior log  Patient Behaviors/Mood: Calm, Cooperative       Sleep Pattern  Sleep log      Patient states that he slept on overnight    Discharge Planning Education  (Medication/Device/Injections/Etc)     Patient educated on medication timing    ADL/Mobility Summary  (transfer, bed mobility, ambulation)   Assist x 1 with roller walker. Must wear surgilcal shoe     Miscellaneous

## 2025-07-16 NOTE — ASSESSMENT & PLAN NOTE
S/p R CFA/SFA endarterectomy w/ bovine pericardial patch, DCB and stenting of SFA, and angio of AT 5/13/25 ([facility])   > Plavix, Statin, Eliquis  > Monitor neurovascular exam at least daily  > f/u Vascular surgery

## 2025-07-16 NOTE — ASSESSMENT & PLAN NOTE
Lab Results   Component Value Date    HGBA1C 6.6 (H) 06/18/2025       Recent Labs     07/15/25  1637 07/15/25  2030 07/16/25  0730 07/16/25  0913   POCGLU 282* 180* 50* 106       Blood Sugar Average: Last 72 hrs:  (P) [id number]: Glipizide 10mg BID, Linagliptin 5mg daily (listed as not taking), Metformin 500mg at dinner   Here: Lantus 12 units QHS, Lispro 4 units TID with meals, CDI/Accuchecks.  Will need to transition to or optimize home meds, or else patient will need to diabetes kit/education.  Management as per IM.

## 2025-07-16 NOTE — PROGRESS NOTES
Progress Note - PMR   Name: Gold Van 79 y.o. 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  S/p TMA on 6/23 by Dr. Galeas  Unclear if source control  OR Cultures: Serratia, MSSA, Pseudomonas  Blood cultures 6/26 with NGTD  > Per ID continue Cefepime through 8/6 for 6 weeks treatment  > PICC placed on 6/30  > Monitor labs at least weekly for toxicities  > Pain management  > Close incisional monitoring and care.  > NWB RLE  > PT/OT 3-5 hours/day, 5-7 days/week  > f/u Podiatry ~ 7/22 9:00 a   Patient f/u w/ Dr. Galeas in 7/8: sutures removed; will need weekly debridement; full heel weight bearing and avoid heel - to toe gait in surgical shoe    7/15: improved healing at dehiscence area; surgical debridement performed  > outpatient f/u with ID   - 7/22 Home PT/OT/RN  Foot drop, left  L ankle weakness in both PF/DF/EHL  Unclear if related to previous CVA in 2024 (CVA in setting of COPD exacerbation, but presented like impaired coordination)  Also with peripheral neuropathy  Denies back/radicular pain.  Was present in last [facility] admission in 2024.   Previously attempted to get AFO, but he declined it due to copay.   > Outpatient f/u with podiatry  CVA (cerebrovascular accident) ([facility])  10/2024  Presented with: loss of coordination in the setting of a COPD exacerbation. Imaging with significant and severe intracranial stenosis and atherosclerotic diseases as well as multiple foci of acute infarcts involving bilateral frontoparietal cortices.   Felt to be 2/2 hypoperfusion with question of hypercoag related to COVID>  Was on DAPT for 90 days, followed by ASA monotherapy  Also found to have Afib.   > Continue Plavix, Statin, Eliquis   Neuropathy  Likely 2/2  T2DM   Has some stocking dependent sensation impairment  May impact balance  No associated pain  >Close monitoring of skin/incision.   Pressure injury of skin of sacral region  POA   >Frequent off loading  Turn patient Q2hr in bed  Specialty cushion when OOB   Local care as per wound care   Consulted nutrition/wound care for continuity  Outpatient f/u with wound care clinic   Pressure injury of right heel, stage 1  POA to ARC  > Elevate heels of bed  > Allevyn daily and monitoring Qshift  > Outpatient f/u with Podiatry   Acute pain  Minimal  Mostly in sacrum  > Tylenol PRN  > Oxycodone 2.5-5mg Q4hr PRN  > Monitor and wean as tolerated   Multiple open wounds of lower leg  Dermatitis associated with moisture  POA to ARC  > Continue local wound care to pretibial wounds  Skin Care orders 7/15:  1-Hydraguard to Left heels 2 times per day and as needed    2-EHOB/WAFFLE or Roho  cushion when out of bed in chair.  3-Moisturize skin daily with skin nourishing cream  4-Elevate heels to offload pressure. EHOB off loading boots when in bed   5-Turn/reposition every 2 hours to offload and prevent pressure   6. Apply silicone foam to the right heel ramone with a P an ddate peel and check skin integrity every shift change every 3 days   7. Cleanse sacrum, buttocks with soap and water apply triad paste to the wound on the right buttocks only then a silicone foam ramone with a T and date check skin integrity every shift and change every other day or when soiled   8. P 500 low air loss mattress   9. Dr. 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     Severe protein-calorie malnutrition (HCC)  Malnutrition Findings:   Adult Malnutrition type: Chronic illness  Adult Degree of Malnutrition: Other severe protein calorie malnutrition  Malnutrition Characteristics: Inadequate energy, Weight loss                  360 Statement: Severe protein/calorie malnutrition r/t inadequate intake as evidenced by 12.8% unplanned wt loss since 4/8/25,  Consuming < 75% energy intake compared to estimated energy needs > 1 month, nonhealing wounds. Treated with oral diet + ONS of Isaac bid and Ensure Plus HP 1 x day    BMI Findings:           Body mass index is 19.05 kg/m².   > Nutrition consulted   >encourage PO intake  At risk for venous thromboembolism (VTE)  Fully anticoagulated on eliquis, SCDs, Ambulation   Chronic heart failure with preserved ejection fraction (HFpEF) ([facility])  Wt Readings from Last 3 Encounters:   07/16/25 63.7 kg (140 lb 6.9 oz)   06/26/25 62.1 kg (137 lb)   06/10/25 67.1 kg (148 lb)   6/26 Echo with EF 50-55%, Diastolic function WNL  Follows with Dr. Nelson  Home: patient declined diuretic regimen, is on Lisinopril 5mg daily  > Here: Lisinopril 5mg daily  > Monitor volume status, lytes  > I/O, daily weights.  > Management as per IM  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[facility])  Home: Breztri BID, Albuterol PRN, Singulair nightly  Here: Budesonide-Glycopyrrol-Formoterol BID, Singulair QHS, Albuterol PRN   No acute exacerbation    Management as per IM  F/u [facility]  Essential hypertension  Home: Atenolol 25mg daily, Lisinopril 5mg daily  > Here: Atenolol 25mg daily, Lisinopril 5mg daily  Gastroesophageal reflux disease without esophagitis  Home: Famotidine 20mg BID  Here: Famotidine daily  Hyponatremia  Chronically 130-132  Here: 132  Not receiving any treatment currently  Monitor with routine blood work   PAF (paroxysmal atrial fibrillation) ([facility])  Home: Eliquis mg BID, no rate/rhythm control  Here: Same.  Monitor and adjust as appropriate  Severe peripheral arterial disease ([facility])  S/p R CFA/SFA endarterectomy w/ bovine pericardial patch, DCB and stenting of SFA, and angio of AT 5/13/25 ([facility])   > Plavix, Statin, Eliquis  > Monitor neurovascular exam at least daily  > f/u Vascular surgery   Type 2 diabetes mellitus with complication, without long-term current use of insulin ([facility])  Lab Results   Component Value Date    HGBA1C 6.6 (H) 06/18/2025        Recent Labs     07/15/25  1637 07/15/25  2030 07/16/25  0730 07/16/25  0913   POCGLU 282* 180* 50* 106       Blood Sugar Average: Last 72 hrs:  (P) [id number]: Glipizide 10mg BID, Linagliptin 5mg daily (listed as not taking), Metformin 500mg at dinner   Here: Lantus 12 units QHS, Lispro 4 units TID with meals, CDI/Accuchecks.  Will need to transition to or optimize home meds, or else patient will need to diabetes kit/education.  Management as per IM.     Pruritic condition  At home on prednisone 5mg daily  Continued here, but at risk for poor blood sugar control and infection  Monitor for now.   Orthostatic hypotension  Significant orthostatic hypotension w/ dizziness  TEDS and binder when OOB   Midodrine 2.5 BID started 7/10  Encourage PO inake     History of Present Illness   Gold Van is a 79 y.o. male who presented to the hospital with septic shock from wound care appointment. Podiatry and was consulted. MRI R foot concerning for OM of 4th metatarsal. He underwent TMA R foot w/ Dr. Galeas on 6/23. Wound cultures w/ pseudomonas and serratia as well as MSSA. Also notable for 1:2 Bcx of MSSA. TTE w/o obvious vegetation. PICC placed 6/30.        Chief Complaint: f/u ambulatory dysfunction    Interval: Patient seen and examined in room. No events overnight.  Reports overall feeling well. Last BM 7/13. Sleeping well at night.     Objective   Functional Update:  Physical Therapy Occupational Therapy Speech Therapy   Weight Bearing Status: Weight Bearing as Tolerated (WBAT R LE through heel in surgical shoe)  Transfers: Supervision  Bed Mobility: Supervision  Amulation Distance (ft): 150 feet  Ambulation: Incidental Touching  Assistive Device for Ambulation: Roller Walker  Wheelchair Mobility Distance:  (N/A)  Wheelchair Mobility:  (N/A)  Number of Stairs: 14  Assistive Device for Stairs: Bilateral Hand Rails  Stair Assistance: Incidental Touching  Ramp:  (TBA)  Assistive Device for Ramp:   (TBA)  Discharge Recommendations: Home with:  DC Home with:: Family Support, First Floor Setup, Home Physical Therapy   Eating: Independent  Grooming: Supervision  Bathing: Incidental Touching  Bathing: Incidental Touching  Upper Body Dressing: Supervision  Lower Body Dressing: Maximum Assistance  Toileting: Supervision  Tub/Shower Transfer:  (TBA)  Toilet Transfer: Supervision  Cognition: Within Defined Limits  Orientation: Person, Place, Time, Situation                   Temp:  [97 °F (36.1 °C)-98.7 °F (37.1 °C)] 97.6 °F (36.4 °C)  HR:  [75-76] 75  Resp:  [16-18] 16  BP: (112-141)/(60-72) 120/60  SpO2:  [99 %-100 %] 100 %    Physical Exam    Gen: No acute distress,   HEENT: Moist mucus membranes,   Cardiovascular: No edema  Pulmonary: Non-labored breathing on RA   : No schmitt  GI:  non-distended.   MSK: R TMA in surgical shoe   Integumentary: Skin is warm, dry. .  Neuro:Speech is intact. Appropriate to questioning.  Psych: Normal mood and affect.     DVT PPX: eliquis      Scheduled Meds:  Current Facility-Administered Medications   Medication Dose Route Frequency Provider Last Rate    acetaminophen  650 mg Oral Q6H PRN Annie L Dagnall, PA-C      albuterol  2 puff Inhalation Q4H PRN Annie L Dagnall, PA-C      apixaban  5 mg Oral BID Annie L Dagnall, PA-C      atenolol  25 mg Oral Daily Annie L Dagnall, PA-C      atorvastatin  40 mg Oral QPM Annie L Dagnall, PA-C      benzonatate  200 mg Oral TID PRN Annie L Dagnall, PA-C      Budeson-Glycopyrrol-Formoterol  2 puff Inhalation Q12H [facility] Ilana Payne PA-C      cefepime  2,000 mg Intravenous Q8H Annie L Chelseynall, PA-C 2,000 mg (07/16/25 0541)    clopidogrel  75 mg Oral Daily Annie L Dagnall, PA-C      docusate sodium  100 mg Oral Daily Ashley Depadua, MD      famotidine  20 mg Oral Daily Annie L Dagnall, PA-C      glipiZIDE  5 mg Oral BID With Meals Annie L Chelseynall, PA-C      guaiFENesin  600 mg Oral Q12H [facility] Annie Martines PA-C       insulin lispro  1-5 Units Subcutaneous 4x Daily (with meals and at bedtime) Annie L Dagmahadl, PA-C      lisinopril  2.5 mg Oral Daily Annie L Dagnall, PA-C      metFORMIN  500 mg Oral Daily With Dinner Annie L Felecial, PA-C      midodrine  2.5 mg Oral BID AC Julianna Snyder MD      montelukast  10 mg Oral HS Annie L Dagnall, PA-C      multivitamin-minerals  1 tablet Oral Daily Annie L Dagnall, PA-C      ondansetron  4 mg Oral Q6H PRN Annie L Dagnall, PA-C      oxyCODONE  2.5 mg Oral Q4H PRN Annie L Dagnall, PA-C      Or    oxyCODONE  5 mg Oral Q4H PRN Annie L Dagnall, PA-C      polyethylene glycol  17 g Oral Daily PRN Annie L Dagnall, PA-C      predniSONE  10 mg Oral Every Other Day Binh Hurd, DO      predniSONE  5 mg Oral Every Other Day Binh Hurd, DO      sodium chloride  2 spray Each Nare Q1H PRN Annie L Dagnall, PA-C           Lab Results: I have reviewed the following results:  Results from last 7 days   Lab Units 07/14/25  0450   HEMOGLOBIN g/dL 9.5*   HEMATOCRIT % 30.6*   WBC Thousand/uL 7.03   PLATELETS Thousands/uL 265     Results from last 7 days   Lab Units 07/14/25  0450   BUN mg/dL 42*   SODIUM mmol/L 132*   POTASSIUM mmol/L 4.1   CHLORIDE mmol/L 102   CREATININE mg/dL 0.79   AST U/L 12*   ALT U/L 13              Julianna Snyder MD   Physical Medicine and Rehabilitation   07/16/25    I have spent a total time of 36 minutes in caring for this patient on the day of the visit/encounter including Counseling / Coordination of care, Documenting in the medical record, and Communicating with other healthcare professionals .

## 2025-07-17 ENCOUNTER — APPOINTMENT (INPATIENT)
Dept: RADIOLOGY | Facility: HOSPITAL | Age: 80
DRG: 559 | End: 2025-07-17
Payer: COMMERCIAL

## 2025-07-17 NOTE — PLAN OF CARE
Problem: PAIN - ADULT  Goal: Verbalizes/displays adequate comfort level or baseline comfort level  Description: Interventions:  - Encourage patient to monitor pain and request assistance  - Assess pain using appropriate pain scale  - Administer analgesics as ordered based on type and severity of pain and evaluate response  - Implement non-pharmacological measures as appropriate and evaluate response  - Consider cultural and social influences on pain and pain management  - Notify physician/advanced practitioner if interventions unsuccessful or patient reports new pain  - Educate patient/family on pain management process including their role and importance of  reporting pain   - Provide non-pharmacologic/complimentary pain relief interventions  Outcome: Progressing     Problem: SAFETY ADULT  Goal: Patient will remain free of falls  Description: INTERVENTIONS:  - Educate patient/family on patient safety including physical limitations  - Instruct patient to call for assistance with activity   - OT/PT to assist with strengthening/mobility   - Keep Call bell within reach  - Keep bed low and locked with side rails adjusted as appropriate  - Keep care items and personal belongings within reach  - Initiate and maintain comfort rounds  - Make Fall Risk Sign visible to staff    - Apply yellow socks and bracelet for high fall risk patients  - Consider moving patient to room near nurses station  Outcome: Progressing

## 2025-07-17 NOTE — CASE MANAGEMENT
Met with patient and wife as follow-up from yesterday to discuss family training, IV antibiotics, and plan for DC.  Family training completed: car transfer with PT and nursing ed ucation regarding wound care buttocks and right TMA, flushing PICC line, and insulin.  Referral made to [facility], informed patient/wife of 20% coinsurance on supplies until his out of pocket is met.  This will roughly be approximately $30/day.  Both in agreement to continue, provider reserved in [place].  Home health care referral in [place] for [facility], information reserved in [place] and added to DC instructions.  Will continue to follow for DC needs.  Received insurance update from Faviola Alfredo via email with additional requested days covered with anticipated DC on July 22.

## 2025-07-17 NOTE — ASSESSMENT & PLAN NOTE
Home: Breztri BID, Albuterol PRN, Singulair nightly  Here: Budesonide-Glycopyrrol-Formoterol BID, Singulair QHS, Albuterol PRN; scheduled tessalon pearls    No acute exacerbation    Management as per IM  F/u [facility]

## 2025-07-17 NOTE — CASE MANAGEMENT
Met with spouse and nurse manager to review the discussion held during our interdisciplinary team meeting which included the barriers to recovery, interventions to be implemented, goals and plan to attain goals- reference Team Conference note for details. After review and discussion the patient/ family verbalized  understanding and agreement regarding the overall plan. Spouse informed of patient's progress with therapy, and addressed wife's questions and concerns.  Patient's wife and son able to secure additional DME at their [employer] that includes wc, RW, grab bars at shower, shower seat, rollator with seat.  Discussed target DC date of July 22 with [facility] for PT, OT, and RN, patient will have a follow-up wound care appointment at 9AM on day of DC.  Patient's wife will be scheduled with nursing to include education for wound care for buttocks and right foot TMA wound, insulin education in the event that patient continues to require injections at DC (medical team currently transitioning patient back to home meds), and flushing PICC line.  Patient and spouse will complete training for car transfer with therapy,  wife encouraged to bring car vs truck.  Information provided and wife in agreement to referral to [facility] and [facility] for IV antibiotics for post DC, anticipating that patient will require IV antibiotics until Aug 6.  IV medications required 3x/day, currently at 5am, 1pm, and 9pm; requested transition to 7am, 3pm, and 11pm.  Spouse had additional questions regarding cardiac meds and hypotension.  Medical team made aware of request for change in med times and also cardiac medication questions.  Referrals made to [facility] and [facility] in [place].  Will continue to follow for DC needs.

## 2025-07-17 NOTE — PROGRESS NOTES
07/17/25 0900   Pain Assessment   Pain Assessment Tool 0-10   Pain Score No Pain   Restrictions/Precautions   Precautions Bed/chair alarms;Fall Risk;Pain;Multiple lines;Limb alert   Weight Bearing Restrictions Yes   RLE Weight Bearing Per Order WBAT   ROM Restrictions No   Braces or Orthoses Other (Comment)  (RLE sx shoe)   Grooming   Able To Comb/Brush Hair;Wash/Dry Face;Wash/Dry Hands   Limitation Noted In Safety;Strength   Shower/Bathe Self   Type of Assistance Needed Set-up / clean-up   Shower/Bathe Self CARE Score 5   Bathing   Assessed Bath Style Sponge Bath   Anticipated D/C Bath Style Shower;Sponge Bath   Able to Gather/Transport No   Able to Adjust Water Temperature Yes   Able to Wash/Rinse/Dry (body part) Left Arm;Right Arm;L Upper Leg;R Upper Leg;L Lower Leg/Foot;R Lower Leg/Foot;Chest;Abdomen;Perineal Area;Buttocks  (R foot sx shoe)   Limitations Noted in Balance;Endurance;ROM;Safety;Strength   Positioning Seated;Standing   Findings  Seated/standing at sink   Upper Body Dressing   Type of Assistance Needed Set-up / clean-up   Upper Body Dressing CARE Score 5   Lower Body Dressing   Type of Assistance Needed Physical assistance   Physical Assistance Level 25% or less   Comment Assist to get sx shoe through pants hole   Lower Body Dressing CARE Score 3   Putting On/Taking Off Footwear   Type of Assistance Needed Physical assistance   Physical Assistance Level 26%-50%   Comment Unable to don sx shoe   Putting On/Taking Off Footwear CARE Score 3   Dressing/Undressing Clothing   Remove UB Clothes Pullover Shirt   Don UB Clothes Pullover Shirt   Remove LB Clothes Shorts;Undergarment   Don LB Clothes Shorts;Undergarment;TEDs;Shoes   Limitations Noted In Balance;Endurance;Safety;Strength;ROM   Positioning Supported Sit;Standing   Roll Left and Right   Type of Assistance Needed Independent   Roll Left and Right CARE Score 6   Lying to Sitting on Side of Bed   Type of Assistance Needed Supervision   Lying to  Sitting on Side of Bed CARE Score 4   Sit to Stand   Type of Assistance Needed Supervision   Comment RW   Sit to Stand CARE Score 4   Bed-Chair Transfer   Type of Assistance Needed Supervision   Comment RW   Chair/Bed-to-Chair Transfer CARE Score 4   Transfer Bed/Chair/Wheelchair   Limitations Noted In Balance;Endurance;Pain Management;UE Strength;LE Strength   Adaptive Equipment Roller Walker   Toileting Hygiene   Type of Assistance Needed Supervision   Toileting Hygiene CARE Score 4   Toileting   Able to Pull Clothing down yes, up yes.   Able to Manage Clothing Closures Yes   Manage Hygiene Bladder   Limitations Noted In Balance;ROM;Safety;UE Strength;LE Strength   Adaptive Equipment Grab Bar   Toilet Transfer   Type of Assistance Needed Supervision   Toilet Transfer CARE Score 4   Toilet Transfer   Surface Assessed Raised Toilet   Transfer Technique Standard   Limitations Noted In Balance;Endurance;ROM;Safety;UE Strength;LE Strength   Adaptive Equipment Grab Bar;Walker   Exercise Tools   UE Ergometer Mod resistance 5m forward, 5m backward   Theraputty Red mod resistance theraputty 15 small item retrieval using BUEs; focus on bilat FMC   Other Exercise Tool 1 2x15 flexbar bilat UEs pronation/supination and internal/external rotation   Cognition   Overall Cognitive Status WFL   Arousal/Participation Alert;Responsive;Cooperative   Attention Within functional limits   Orientation Level Oriented X4   Memory Decreased recall of precautions   Following Commands Follows one step commands without difficulty   Assessment   Treatment Assessment Pt agreeable to OT session this AM; received lying supine in bed. ADLs, fxl mobility, and therex session completed; current LOF and details listed in respective sections. Pt overall set-up bathing, grooming, and UB dressing; Virgie LB dressing; min/modA LB dressing; supervision transfers. Pt tolerated session well with no c/o of pain or fatigue. Pt's activity tolerance significantly  better than session yesterday. Pt's barriers impacting performance include decreased strength, decreased ROM, decreased endurance, impaired balance, decreased safety awareness, decreased skin integrity, and pain. Pt to benefit from continued skilled OT services to address listed barriers and prepare for safe d/c home.   Prognosis Good   Problem List Decreased strength;Decreased range of motion;Decreased endurance;Impaired balance;Pain;Orthopedic restrictions;Decreased skin integrity;Decreased safety awareness   Plan   Treatment/Interventions ADL retraining;Functional transfer training;Therapeutic exercise;Endurance training;Patient/family training;Equipment eval/education;Compensatory technique education;OT   Progress Progressing toward goals   OT Therapy Minutes   OT Time In 0900   OT Time Out 1030   OT Total Time (minutes) 90   OT Mode of treatment - Individual (minutes) 90   OT Mode of treatment - Concurrent (minutes) 0   Therapy Time missed   Time missed? No

## 2025-07-17 NOTE — ASSESSMENT & PLAN NOTE
Significant orthostatic hypotension w/ dizziness  TEDS and binder when OOB - discontinued 7/17  Midodrine 2.5 BID started 7/10  Encourage PO inake

## 2025-07-17 NOTE — ASSESSMENT & PLAN NOTE
Wt Readings from Last 3 Encounters:   07/17/25 65.6 kg (144 lb 10 oz)   06/26/25 62.1 kg (137 lb)   06/10/25 67.1 kg (148 lb)   6/26 Echo with EF 50-55%, Diastolic function WNL  Follows with Dr. Nelson  Home: patient declined diuretic regimen, is on Lisinopril 5mg daily  > Here: Lisinopril 5mg daily  > Monitor volume status, lytes  > I/O, daily weights.  > Management as per IM

## 2025-07-17 NOTE — ASSESSMENT & PLAN NOTE
S/p R CFA/SFA endarterectomy w/ bovine pericardial patch, DCB and stenting of SFA, and angio of AT 5/13/25 ([facility])   > Plavix, Statin, Eliquis  > Monitor neurovascular exam at least daily  > f/u Vascular surgery

## 2025-07-17 NOTE — PROGRESS NOTES
Progress Note - PMR   Name: Gold Van 79 y.o. 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  S/p TMA on 6/23 by Dr. Galeas  Unclear if source control  OR Cultures: Serratia, MSSA, Pseudomonas  Blood cultures 6/26 with NGTD  > Per ID continue Cefepime through 8/6 for 6 weeks treatment  > PICC placed on 6/30  > Monitor labs at least weekly for toxicities  > Pain management  > Close incisional monitoring and care.  > NWB RLE  > PT/OT 3-5 hours/day, 5-7 days/week  > f/u Podiatry ~ 7/22 9:00 a   Patient f/u w/ Dr. Galeas in 7/8: sutures removed; will need weekly debridement; full heel weight bearing and avoid heel - to toe gait in surgical shoe    7/15: improved healing at dehiscence area; surgical debridement performed  > outpatient f/u with ID   - 7/22 Home PT/OT/RN  Foot drop, left  L ankle weakness in both PF/DF/EHL  Unclear if related to previous CVA in 2024 (CVA in setting of COPD exacerbation, but presented like impaired coordination)  Also with peripheral neuropathy  Denies back/radicular pain.  Was present in last [facility] admission in 2024.   Previously attempted to get AFO, but he declined it due to copay.   > Outpatient f/u with podiatry  CVA (cerebrovascular accident) ([facility])  10/2024  Presented with: loss of coordination in the setting of a COPD exacerbation. Imaging with significant and severe intracranial stenosis and atherosclerotic diseases as well as multiple foci of acute infarcts involving bilateral frontoparietal cortices.   Felt to be 2/2 hypoperfusion with question of hypercoag related to COVID>  Was on DAPT for 90 days, followed by ASA monotherapy  Also found to have Afib.   > Continue Plavix, Statin, Eliquis   Neuropathy  Likely 2/2  T2DM   Has some stocking dependent sensation impairment  May impact balance  No associated pain  >Close monitoring of skin/incision.   Pressure injury of skin of sacral region  POA   >Frequent off loading  Turn patient Q2hr in bed  Specialty cushion when OOB   Local care as per wound care   Consulted nutrition/wound care for continuity  Outpatient f/u with wound care clinic   Pressure injury of right heel, stage 1  POA to ARC  > Elevate heels of bed  > Allevyn daily and monitoring Qshift  > Outpatient f/u with Podiatry   Acute pain  Minimal  Mostly in sacrum  > Tylenol PRN  > Oxycodone 2.5-5mg Q4hr PRN  > Monitor and wean as tolerated   Multiple open wounds of lower leg  Dermatitis associated with moisture  POA to ARC  > Continue local wound care to pretibial wounds  Skin Care orders 7/15:  1-Hydraguard to Left heels 2 times per day and as needed    2-EHOB/WAFFLE or Roho  cushion when out of bed in chair.  3-Moisturize skin daily with skin nourishing cream  4-Elevate heels to offload pressure. EHOB off loading boots when in bed   5-Turn/reposition every 2 hours to offload and prevent pressure   6. Apply silicone foam to the right heel ramone with a P an ddate peel and check skin integrity every shift change every 3 days   7. Cleanse sacrum, buttocks with soap and water apply triad paste to the wound on the right buttocks only then a silicone foam ramone with a T and date check skin integrity every shift and change every other day or when soiled   8. P 500 low air loss mattress   9. Dr. 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     Severe protein-calorie malnutrition (HCC)  Malnutrition Findings:   Adult Malnutrition type: Chronic illness  Adult Degree of Malnutrition: Other severe protein calorie malnutrition  Malnutrition Characteristics: Inadequate energy, Weight loss                  360 Statement: Severe protein/calorie malnutrition r/t inadequate intake as evidenced by 12.8% unplanned wt loss since 4/8/25,  Consuming < 75% energy intake compared to estimated energy needs > 1 month, nonhealing wounds. Treated with oral diet + ONS of Isaac bid and Ensure Plus HP 1 x day    BMI Findings:           Body mass index is 19.61 kg/m².   > Nutrition consulted   >encourage PO intake  At risk for venous thromboembolism (VTE)  Fully anticoagulated on eliquis, SCDs, Ambulation   Chronic heart failure with preserved ejection fraction (HFpEF) ([facility])  Wt Readings from Last 3 Encounters:   07/17/25 65.6 kg (144 lb 10 oz)   06/26/25 62.1 kg (137 lb)   06/10/25 67.1 kg (148 lb)   6/26 Echo with EF 50-55%, Diastolic function WNL  Follows with Dr. Nelson  Home: patient declined diuretic regimen, is on Lisinopril 5mg daily  > Here: Lisinopril 5mg daily  > Monitor volume status, lytes  > I/O, daily weights.  > Management as per IM  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[facility])  Home: Breztri BID, Albuterol PRN, Singulair nightly  Here: Budesonide-Glycopyrrol-Formoterol BID, Singulair QHS, Albuterol PRN; scheduled tessalon pearls    No acute exacerbation    Management as per IM  F/u [facility]  Essential hypertension  Home: Atenolol 25mg daily, Lisinopril 5mg daily  > Here: Atenolol 25mg daily, Lisinopril 5mg daily  Gastroesophageal reflux disease without esophagitis  Home: Famotidine 20mg BID  Here: Famotidine daily  Hyponatremia  Chronically 130-132  Here: 132  Not receiving any treatment currently  Monitor with routine blood work   PAF (paroxysmal atrial fibrillation) ([facility])  Home: Eliquis mg BID, no rate/rhythm control  Here: Same.  Monitor and adjust as appropriate  Severe peripheral arterial disease ([facility])  S/p R CFA/SFA endarterectomy w/ bovine pericardial patch, DCB and stenting of SFA, and angio of AT 5/13/25 ([facility])   > Plavix, Statin, Eliquis  > Monitor neurovascular exam at least daily  > f/u Vascular surgery   Type 2 diabetes mellitus with complication, without long-term current use of insulin ([facility])  Lab Results   Component Value Date     HGBA1C 6.6 (H) 06/18/2025       Recent Labs     07/16/25  1620 07/16/25  2108 07/17/25  0732 07/17/25  0801   POCGLU 166* 211* 57* 83       Blood Sugar Average: Last 72 hrs:  (P) 149.2Home: Glipizide 10mg BID, Linagliptin 5mg daily (listed as not taking), Metformin 500mg at dinner   Here: Lantus 12 units QHS, Lispro 4 units TID with meals, CDI/Accuchecks.  Will need to transition to or optimize home meds, or else patient will need to diabetes kit/education.  Management as per IM.     Pruritic condition  At home on prednisone 5mg daily  Continued here, but at risk for poor blood sugar control and infection  Monitor for now.   Orthostatic hypotension  Significant orthostatic hypotension w/ dizziness  TEDS and binder when OOB - discontinued 7/17  Midodrine 2.5 BID started 7/10  Encourage PO inake     History of Present Illness   Gold Van is a 79 y.o. male who presented to the hospital with septic shock from wound care appointment. Podiatry and was consulted. MRI R foot concerning for OM of 4th metatarsal. He underwent TMA R foot w/ Dr. Galeas on 6/23. Wound cultures w/ pseudomonas and serratia as well as MSSA. Also notable for 1:2 Bcx of MSSA. TTE w/o obvious vegetation. PICC placed 6/30.        Chief Complaint: f/u ambulatory dysfunction    Interval: Patient seen and examined in room. No events overnight. Continues to have chest tightness. Reports that taking the albuterol has helped. Continues to feel congested and has a cough. Last BM 7/16.    Objective   Functional Update:  Physical Therapy Occupational Therapy Speech Therapy   Weight Bearing Status: Weight Bearing as Tolerated (WBAT R LE through heel in surgical shoe)  Transfers: Supervision  Bed Mobility: Supervision  Amulation Distance (ft): 150 feet  Ambulation: Incidental Touching  Assistive Device for Ambulation: Roller Walker  Wheelchair Mobility Distance:  (N/A)  Wheelchair Mobility:  (N/A)  Number of Stairs: 14  Assistive Device for Stairs: Bilateral  Hand Rails  Stair Assistance: Incidental Touching  Ramp:  (TBA)  Assistive Device for Ramp:  (TBA)  Discharge Recommendations: Home with:  DC Home with:: Family Support, First Floor Setup, Home Physical Therapy   Eating: Independent  Grooming: Supervision  Bathing: Incidental Touching  Bathing: Incidental Touching  Upper Body Dressing: Supervision  Lower Body Dressing: Maximum Assistance  Toileting: Supervision  Tub/Shower Transfer:  (TBA)  Toilet Transfer: Supervision  Cognition: Within Defined Limits  Orientation: Person, Place, Time, Situation                   Temp:  [97.3 °F (36.3 °C)-98.7 °F (37.1 °C)] 97.9 °F (36.6 °C)  HR:  [73-85] 73  Resp:  [16] 16  BP: (115-143)/(56-68) 143/68  SpO2:  [97 %-100 %] 99 %    Physical Exam    Gen: No acute distress,  HEENT: Moist mucus membranes,  Cardiovascular:  No edema  Pulmonary: Non-labored breathing. On RA   : No schmitt  GI:  non-distended.   MSK: R TMA   Integumentary: Skin is warm, dry. .  Neuro: Speech is intact. Appropriate to questioning.  Psych: Normal mood and affect.     DVT PPX: eliquis     Scheduled Meds:  Current Facility-Administered Medications   Medication Dose Route Frequency Provider Last Rate    acetaminophen  650 mg Oral Q6H PRN Annie L Dagnall, PA-C      albuterol  2 puff Inhalation Q4H PRN Annie L Dagnall, PA-C      apixaban  5 mg Oral BID Annie L Dagnall, PA-C      atenolol  25 mg Oral Daily Annie L Dagnall, PA-C      atorvastatin  40 mg Oral QPM Annie L Dagnall, PA-C      benzonatate  200 mg Oral TID Julianna Snyder MD      Budeson-Glycopyrrol-Formoterol  2 puff Inhalation Q12H [facility] Ilana Payne PA-C      cefepime  2,000 mg Intravenous Q8H Roshni Quiroga PA-C 2,000 mg (07/17/25 0550)    clopidogrel  75 mg Oral Daily Annie L Dagnall, PA-C      docusate sodium  100 mg Oral Daily Ashley Depadua, MD      famotidine  20 mg Oral Daily Annie L Dagnall, PA-C      glipiZIDE  5 mg Oral BID With Meals Annie Martines PA-C       guaiFENesin  1,200 mg Oral Q12H [facility] Julianna Snyder MD      insulin lispro  1-5 Units Subcutaneous 4x Daily (with meals and at bedtime) Annie Martines PA-C      lisinopril  2.5 mg Oral Daily Annie Martines PA-C      melatonin  3 mg Oral HS Julianna Snyder MD      metFORMIN  500 mg Oral Daily With Dinner Annie Martines PA-C      midodrine  2.5 mg Oral BID AC Julianna Snyder MD      montelukast  10 mg Oral HS BONILLA Salgado-MICHAEL      multivitamin-minerals  1 tablet Oral Daily BONILLA Salgado-C      ondansetron  4 mg Oral Q6H PRN Annie Izquierdol, PA-C      oxyCODONE  2.5 mg Oral Q4H PRN Annie Izquierdol, PA-C      Or    oxyCODONE  5 mg Oral Q4H PRN Annie Izquierdol, PA-C      polyethylene glycol  17 g Oral Daily PRN Annie Martines, PA-C      predniSONE  10 mg Oral Every Other Day Binh Hurd, DO      predniSONE  5 mg Oral Every Other Day Binh Hurd, DO      sodium chloride  2 spray Each Nare Q1H PRN BRODERICK SalgadoC           Lab Results: I have reviewed the following results:  Results from last 7 days   Lab Units 07/17/25  0627 07/14/25  0450   HEMOGLOBIN g/dL 9.2* 9.5*   HEMATOCRIT % 29.7* 30.6*   WBC Thousand/uL 7.50 7.03   PLATELETS Thousands/uL 235 265     Results from last 7 days   Lab Units 07/17/25  0627 07/14/25  0450   BUN mg/dL 36* 42*   SODIUM mmol/L 133* 132*   POTASSIUM mmol/L 4.5 4.1   CHLORIDE mmol/L 102 102   CREATININE mg/dL 0.82 0.79   AST U/L  --  12*   ALT U/L  --  13              Julianna Snyder MD   Physical Medicine and Rehabilitation   07/17/25    I have spent a total time of 37 minutes in caring for this patient on the day of the visit/encounter including Counseling / Coordination of care, Documenting in the medical record, and Communicating with other healthcare professionals .

## 2025-07-17 NOTE — ASSESSMENT & PLAN NOTE
Lab Results   Component Value Date    HGBA1C 6.6 (H) 06/18/2025       Recent Labs     07/16/25  1620 07/16/25  2108 07/17/25  0732 07/17/25  0801   POCGLU 166* 211* 57* 83       Blood Sugar Average: Last 72 hrs:  (P) 149.2Home: Glipizide 10mg BID, Linagliptin 5mg daily (listed as not taking), Metformin 500mg at dinner   Here: Lantus 12 units QHS, Lispro 4 units TID with meals, CDI/Accuchecks.  Will need to transition to or optimize home meds, or else patient will need to diabetes kit/education.  Management as per IM.

## 2025-07-17 NOTE — NURSING NOTE
[facility]  RN Shift Note        Vitals  Vital Signs  Temperature: 98.7 °F (37.1 °C)  Temp Source: Temporal  Pulse: 77  Heart Rate Source: Monitor  Respirations: 16  Blood Pressure: 115/56  MAP (mmHg): 80  BP Location: Left arm  BP Method: Automatic  Patient Position - Orthostatic VS: Lying        Follow up/Interventions- vital signs as per unit protocol   Pain Pain Score: 0  Hospital Pain Intervention(s): Medication (See MAR)    Follow up/Interventions- no reports of pain this shift   GI   (WNL/X)  LBM  Incontinence (yes/no)     Gastrointestinal (WDL): Exceptions to WDL  Last BM Date: 07/16/25  Bowel Incontinence: No    Follow up/Interventions- colace scheduled      (WNL/X)  Incontinence (yes/no)  Bladder Scan  Urine Output  Shift Total Output  Unmeasured Occurrence   Genitourinary (WDL): Within Defined Limits  Urinary Incontinence: No    Urine: 250 mL    Unmeasured Urine Occurrence: 1      Follow up/Interventions- Patient was continent of bladder overnight   Nutrition  Diet/Precautions   PO Intake  Meal Consumption   Nutrition  Feeding: Able to feed self  Diet Type: Diabetic  Appetite: Good  P.O.: 140 mL  Percent Meals Eaten (%): 100         LDA  Drain Output             Follow up/Interventions- PICC line for IV antibiotic administration   Skin   (WNL/X)  Integrity  Pressure Injury YES/NO: yes     Integumentary (WDL): Exceptions to WDL  Skin Integrity: Bruising  Description stage II sacrum     Follow up/Interventions- Dressing changes as ordered by nursing   Behavior/Mood  Behavior log YES/NO: no Patient Behaviors/Mood: Calm, Cooperative       Sleep Pattern  Sleep log no        Discharge Planning Education  (Medication/Device/Injections/Etc)     Education on PICC line management and IV antibiotic administration   ADL/Mobility Summary  (transfer, bed mobility, ambulation)   Transfers one assist with a walker and a surgical shoe     Miscellaneous

## 2025-07-17 NOTE — NURSING NOTE
[facility]  RN Shift Note        Vitals  Vital Signs  Temperature: 97.9 °F (36.6 °C)  Temp Source: Temporal  Pulse: 73  Heart Rate Source: Monitor  Respirations: 16  Blood Pressure: 143/68  MAP (mmHg): 80  BP Location: Left arm  BP Method: Automatic  Patient Position - Orthostatic VS: Lying        Follow up/Interventions-    Pain Pain Score: 0  Hospital Pain Intervention(s): Medication (See MAR)    Follow up/Interventions- denies pain on assessmetn    GI   (WNL/X)  LBM  Incontinence (yes/no)     Gastrointestinal (WDL): Within Defined Limits  Last BM Date: 07/16/25  Bowel Incontinence: No    Follow up/Interventions- No BM noted this shift       (WNL/X)  Incontinence (yes/no)  Bladder Scan  Urine Output  Shift Total Output  Unmeasured Occurrence   Genitourinary (WDL): Within Defined Limits  Urinary Incontinence: No    Urine: 250 mL    Unmeasured Urine Occurrence: 1      Follow up/Interventions- voiding qS, continent    Nutrition  Diet/Precautions   PO Intake  Meal Consumption   Nutrition  Feeding: Able to feed self  Diet Type: Diabetic  Appetite: Good  P.O.: 300 mL  Percent Meals Eaten (%): 75      Strategies/Interventions-   Follow up- tolerating current diet without difficultuy    LDA  Drain Output             Follow up/Interventions-    Skin   (WNL/X)  Integrity  Pressure Injury YES/NO: yes     Integumentary (WDL): Exceptions to WDL  Skin Integrity: Bruising  Description     Follow up/Interventions- Right foot surgical incision redressed per MD orders with education of same to wife and patient.  Moderate amount serosanguineous drainage noted on old dressing. Allevyn to Right heel changed as well    Behavior/Mood  Behavior log YES/NO: no Patient Behaviors/Mood: Calm, Cooperative    Follow up/Interventions- Pleasant with interactions    Sleep Pattern  Sleep log no     Reports adequate sleep last night    Discharge Planning Education  (Medication/Device/Injections/Etc)     PICC line flush  education completed with spouse at bedside.  She was able to flush port and attach IV tubing under supervision of nurse with verbal cues and minimal assistance.  Questions regarding PICC care/ flushing and IV antibiotics addressed at time of education.  Surgical wound care education also completed with spouse at bedside.  She did actively participate in dressing change with supervision, verbal cues and minimal assistance of staff.  Will complete education again tomorrow with spouse to improve competence and comfortability of same.    ADL/Mobility Summary  (transfer, bed mobility, ambulation)   Transfers and ambulates assist of 1 with RW.  Participated in therapy sessions and tolerated same well      Miscellaneous

## 2025-07-17 NOTE — PROGRESS NOTES
Progress Note - Gold Van 79 y.o. male MRN: [id number]    Unit/Bed#: [facility] 215-01 Encounter: [id number]        Subjective:   Patient seen and examined at bedside after reviewing the chart and discussing the case with the caring staff.      Patient examined at bedside.  Patient reports experiencing intermittent episodes of chest tightness with shortness of breath since yesterday.  Symptoms can subside on own and also improve with albuterol inhaler.  Has ongoing cough which improves with tessalon perles.     CXR ordered  Blood sugar 57 this morning.   Labs 7/17: hgb 9.2, sodium 133    Physical Exam   Vitals: Blood pressure 143/68, pulse 73, temperature 97.9 °F (36.6 °C), temperature source Temporal, resp. rate 16, height 6' (1.829 m), weight 65.6 kg (144 lb 10 oz), SpO2 99%.,Body mass index is 19.61 kg/m².  Constitutional: Patient in no acute distress.  HEENT: PERR, EOMI, MMM.  Cardiovascular: Normal rate and regular rhythm.    Pulmonary/Chest: Effort normal and breath sounds normal.   Abdomen: Soft, + BS, NT.    Assessment/Plan:  Gold Van is a(n) 79 y.o. 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 Galeas.      Cardiac with hx of HTN, HLD, PAF, CHF, cardiomyopathy, orthostatic hypotension.  Continue atenolol 25 mg daily, lisinopril 5 mg daily (decr from 5mg on 7/10), atorvastatin 40 mg daily, Eliquis 5 mg twice daily, midodrine 2.5 mg twice daily (started 7/10).  Daily weights.  Apply abd binder and JOSEFA stockings.  T2DM.  Hgb A1c 6.6% on 6/18/25.  Home: Glucotrol XL 10 mg twice daily and metformin  mg daily.  Here: metformin  mg daily (restarted 7/6) and Glucotrol XL 5mg daily (restarted 7/15 however decr from BID to daily 7/17 due to hypoglycemia).  D/c Humalog 4u TID on 7/15, d/c Lantus 12u nightly on 7/16.  SSI with accuchecks ac.  Carb controlled diet.    Hx CVA.  10/2024.  Continue Plavix, Eliquis and statin.  PAD.  S/p R CFA/SFA endarterectomy/stenting on  5/13/25.  Continue Plavix and statin.  GERD.  Patient is on famotidine 20 mg daily.    COPD/asthma.  Continue home Breztri (brought from home), Singulair 10 mg nightly, albuterol inhaler as needed.  CKD stage 2.  Stable.  Cr 0.77 -> 0.76 -> 0.79 -> 0.82 on 7/17.  Peak Cr 1.56.  Avoid nephrotoxins.  Cont to monitor.  Chronic generalized pruritus.  Continue prednisone 5 mg daily.    Chronic hyponatremia.  Level 132 -> 132 -> 133 on 7/17.  Asymptomatic.  Cont to trend.    Anemia.  Stable.  Hgb 9.2 -> 9.5 -> 9.2 on 7/17.  Cont to trend.   Cough.  Ongoing x weeks.  CXR on 6/17 negative.  Start Mucinex 600 mg q12h 7/6.  Tessalon perles changed to TID 7/17.  Repeat CXR ordered 7/17.   MSSA bacteremia.  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  Now s/p right TMA.  Patient is on IV cefepime 2g q8h for 6 weeks (thru 8/6/25).  RUE PICC placed 6/30 and to be removed by RN after final dose of antibiotics.  Weekly CBCD and creatinine while on IV abx.  Pain and bowel regimen.  As per physiatry.  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  OR cultures grew serratia, pseudomonas, MSSA.  Patient is on IV cefepime 2g q8h for 6 weeks (thru 8/6/25).  Saw podiatry 7/8, sutures removed, full heel weight bearing with surgical shoe.  Saw podiatry 7/16 for surgical debridement.  He is receiving intensive PT OT with management as per physiatry.      Anticipated date of discharge.  Tuesday 7/22/25    The patient was discussed with Dr. Hendrickson and he is in agreement with the above note.

## 2025-07-17 NOTE — NUTRITION
07/17/25 1330   Biochemical Data,Medical Tests, and Procedures   Biochemical Data/Medical Tests/Procedures Lab values reviewed;Meds reviewed   Labs (Comment) 7/17 Na:133, BUN:36, H&H:9.2/29.7   Meds (Comment) eliquis, tenormin, lipitor, plavix, pepcid, glipizide, humalog, zestril, melatonin, metformin, centrum, zofran, prednisone   Nutrition-Focused Physical Exam   Nutrition-Focused Physical Exam Findings RN skin assessment reviewed  (Pressure injury right buttocks, surgical wound right foot per documentation)   Nutrition-Focused Physical Exam Findings Trace RLE edema   Medical-Related Concerns PMH reviewed   Adequacy of Intake   Nutrition Modality PO   Feeding Route   PO Independent   Current PO Intake   Current Diet Order CCD 3 thin liquids   Nutrition Supplements Isaac;Ensure Plus High Protein  (each BID)   Current Meal Intake %   Intake Supplements %   Estimated calorie intake compared to estimated need Nutrient needs met.   PES Statement   Problem Continue previous diagnosis   Recommendations/Interventions   Malnutrition/BMI Present Yes  (as previously noted)   Summary CCD 3 thin liquids. Orange Isaac BID in water. Vanilla ensure plus high protein BID. Meal completions %. He states his consuming the supplements and ordered Isaac for at home. 7/17 144#, BMI=19.61. 7/5 143#. Weight stable from admission. Skin integrity reviewed. Trace RLE edema. LBM 7/16. Patient with low BG on 7/16 and 7/17, encouraged evening snack and discussed options available. Wife present during visit. He states his appetite is a little better. He states he is doing good with the supplements. He reports he feels the Isaac supplement is not mixed well; discussed with . Continue current interventions.   Interventions/Recommendations Continue current diet order;Supplement continue   Education Assessment   Education Education not indicated at this time   Patient Nutrition Goals   Goal Avoid weight  loss;Increase kcal/PRO intake;Improve skin integrity   Goal Status Met;Extended   Timeframe to complete goal by next f/u   Nutrition Complexity Risk   Nutrition complexity level Low risk   Follow up date 07/24/25

## 2025-07-17 NOTE — ASSESSMENT & PLAN NOTE
Malnutrition Findings:   Adult Malnutrition type: Chronic illness  Adult Degree of Malnutrition: Other severe protein calorie malnutrition  Malnutrition Characteristics: Inadequate energy, Weight loss                  360 Statement: Severe protein/calorie malnutrition r/t inadequate intake as evidenced by 12.8% unplanned wt loss since 4/8/25, Consuming < 75% energy intake compared to estimated energy needs > 1 month, nonhealing wounds. Treated with oral diet + ONS of Isaac bid and Ensure Plus HP 1 x day    BMI Findings:           Body mass index is 19.61 kg/m².   > Nutrition consulted   >encourage PO intake

## 2025-07-18 NOTE — PROGRESS NOTES
Progress Note - PMR   Name: Gold Van 79 y.o. 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)  2/2 nonhealing wound, presented in septic shock.  S/p TMA on 6/23 by Dr. Galeas  Unclear if source control  OR Cultures: Serratia, MSSA, Pseudomonas  Blood cultures 6/26 with NGTD  > Per ID continue Cefepime through 8/6 for 6 weeks treatment  > PICC placed on 6/30  > Monitor labs at least weekly for toxicities  > Pain management  > Close incisional monitoring and care.  > NWB RLE  > PT/OT 3-5 hours/day, 5-7 days/week  > f/u Podiatry ~ 7/22 9:00 a   Patient f/u w/ Dr. Galeas in 7/8: sutures removed; will need weekly debridement; full heel weight bearing and avoid heel - to toe gait in surgical shoe    7/15: improved healing at dehiscence area; surgical debridement performed  > outpatient f/u with ID   - 7/22 Home PT/OT/RN; family training for IV management has been completed and will continue over the weekend for repetition  Foot drop, left  L ankle weakness in both PF/DF/EHL  Unclear if related to previous CVA in 2024 (CVA in setting of COPD exacerbation, but presented like impaired coordination)  Also with peripheral neuropathy  Denies back/radicular pain.  Was present in last [facility] admission in 2024.   Previously attempted to get AFO, but he declined it due to copay.   > Outpatient f/u with podiatry  CVA (cerebrovascular accident) ([facility])  10/2024  Presented with: loss of coordination in the setting of a COPD exacerbation. Imaging with significant and severe intracranial stenosis and atherosclerotic diseases as well as multiple foci of acute infarcts involving bilateral frontoparietal cortices.   Felt to be 2/2 hypoperfusion with question of hypercoag related to COVID>  Was on DAPT for 90 days, followed by ASA  monotherapy  Also found to have Afib.   > Continue Plavix, Statin, Eliquis   Neuropathy  Likely 2/2 T2DM   Has some stocking dependent sensation impairment  May impact balance  No associated pain  >Close monitoring of skin/incision.   Pressure injury of skin of sacral region  POA   >Frequent off loading  Turn patient Q2hr in bed  Specialty cushion when OOB   Local care as per wound care   Consulted nutrition/wound care for continuity  Outpatient f/u with wound care clinic   Pressure injury of right heel, stage 1  POA to ARC  > Elevate heels of bed  > Allevyn daily and monitoring Qshift  > Outpatient f/u with Podiatry   Acute pain  Minimal  Mostly in sacrum  > Tylenol PRN  > Oxycodone 2.5-5mg Q4hr PRN  > Monitor and wean as tolerated   Multiple open wounds of lower leg  Dermatitis associated with moisture  POA to ARC  > Continue local wound care to pretibial wounds  Skin Care orders 7/15:  1-Hydraguard to Left heels 2 times per day and as needed    2-EHOB/WAFFLE or Roho  cushion when out of bed in chair.  3-Moisturize skin daily with skin nourishing cream  4-Elevate heels to offload pressure. EHOB off loading boots when in bed   5-Turn/reposition every 2 hours to offload and prevent pressure   6. Apply silicone foam to the right heel ramone with a P an ddate peel and check skin integrity every shift change every 3 days   7. Cleanse sacrum, buttocks with soap and water apply triad paste to the wound on the right buttocks only then a silicone foam ramone with a T and date check skin integrity every shift and change every other day or when soiled   8. P 500 low air loss mattress   9. Dr. 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     Severe protein-calorie malnutrition (HCC)  Malnutrition Findings:   Adult Malnutrition type: Chronic illness  Adult Degree of Malnutrition: Other severe protein calorie malnutrition  Malnutrition Characteristics: Inadequate energy, Weight loss                  360 Statement: Severe  protein/calorie malnutrition r/t inadequate intake as evidenced by 12.8% unplanned wt loss since 4/8/25, Consuming < 75% energy intake compared to estimated energy needs > 1 month, nonhealing wounds. Treated with oral diet + ONS of Isaac bid and Ensure Plus HP 1 x day    BMI Findings:           Body mass index is 19.61 kg/m².   > Nutrition consulted   >encourage PO intake  At risk for venous thromboembolism (VTE)  Fully anticoagulated on eliquis, SCDs, Ambulation   Chronic heart failure with preserved ejection fraction (HFpEF) ([facility])  Wt Readings from Last 3 Encounters:   07/18/25 65.6 kg (144 lb 9.2 oz)   06/26/25 62.1 kg (137 lb)   06/10/25 67.1 kg (148 lb)   6/26 Echo with EF 50-55%, Diastolic function WNL  Follows with Dr. Nelson  Home: patient declined diuretic regimen, is on Lisinopril 5mg daily  > Here: Lisinopril 5mg daily  > Monitor volume status, lytes  > I/O, daily weights.  > Management as per IM  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[facility])  Home: Breztri BID, Albuterol PRN, Singulair nightly  Here: Budesonide-Glycopyrrol-Formoterol BID, Singulair QHS, Albuterol PRN; scheduled tessalon pearls    No acute exacerbation    Management as per IM  F/u [facility]  CXR 7/17: negative for acute cardiopulm disease   Essential hypertension  Home: Atenolol 25mg daily, Lisinopril 5mg daily  > Here: Atenolol 25mg daily, Lisinopril 5mg daily  Gastroesophageal reflux disease without esophagitis  Home: Famotidine 20mg BID  Here: Famotidine daily  Hyponatremia  Chronically 130-132  Here: 132  Not receiving any treatment currently  Monitor with routine blood work   PAF (paroxysmal atrial fibrillation) ([facility])  Home: Eliquis mg BID, no rate/rhythm control  Here: Same.  Monitor and adjust as appropriate  Severe peripheral arterial disease ([facility])  S/p R CFA/SFA endarterectomy w/ bovine pericardial patch, DCB and stenting of SFA, and angio of AT 5/13/25 ([facility])   > Plavix, Statin, Eliquis  > Monitor neurovascular exam at least daily  >  f/u Vascular surgery   Type 2 diabetes mellitus with complication, without long-term current use of insulin (HCC)  Lab Results   Component Value Date    HGBA1C 6.6 (H) 06/18/2025       Recent Labs     07/17/25  1120 07/17/25  1621 07/17/25  2110 07/18/25  0810   POCGLU 75 236* 207* 69       Blood Sugar Average: Last 72 hrs:  (P) [id number]: Glipizide 10mg BID, Linagliptin 5mg daily (listed as not taking), Metformin 500mg at dinner   Here: Lantus 12 units QHS, Lispro 4 units TID with meals, CDI/Accuchecks.  Will need to transition to or optimize home meds, or else patient will need to diabetes kit/education.  Management as per IM.     Pruritic condition  At home on prednisone 5mg daily  Continued here, but at risk for poor blood sugar control and infection  Monitor for now.   Orthostatic hypotension  Significant orthostatic hypotension w/ dizziness  TEDS and binder when OOB - discontinued 7/17  Midodrine 2.5 BID started 7/10  Encourage PO inake     History of Present Illness   Gold Van is a 79 y.o. male who presented to the hospital with septic shock from wound care appointment. Podiatry and was consulted. MRI R foot concerning for OM of 4th metatarsal. He underwent TMA R foot w/ Dr. Galeas on 6/23. Wound cultures w/ pseudomonas and serratia as well as MSSA. Also notable for 1:2 Bcx of MSSA. TTE w/o obvious vegetation. PICC placed 6/30.        Chief Complaint: f/u ambulatory dysfunction    Interval: Patient seen and examined in recliner. No events overnight. Reports chest tightness does feel better with taking the tessalon pearls more consistently.   Reports overall feeling well. Last BM 7/16. Sleeping well at night.     Objective   Functional Update:  Physical Therapy Occupational Therapy Speech Therapy   Weight Bearing Status: Weight Bearing as Tolerated (WBAT R LE through heel in surgical shoe)  Transfers: Supervision  Bed Mobility: Supervision  Amulation Distance (ft): 150 feet  Ambulation:  Incidental Touching  Assistive Device for Ambulation: Roller Walker  Wheelchair Mobility Distance:  (N/A)  Wheelchair Mobility:  (N/A)  Number of Stairs: 14  Assistive Device for Stairs: Bilateral Hand Rails  Stair Assistance: Incidental Touching  Ramp:  (TBA)  Assistive Device for Ramp:  (TBA)  Discharge Recommendations: Home with:  DC Home with:: Family Support, First Floor Setup, Home Physical Therapy   Eating: Independent  Grooming: Supervision  Bathing: Incidental Touching  Bathing: Incidental Touching  Upper Body Dressing: Supervision  Lower Body Dressing: Maximum Assistance  Toileting: Supervision  Tub/Shower Transfer:  (TBA)  Toilet Transfer: Supervision  Cognition: Within Defined Limits  Orientation: Person, Place, Time, Situation                   Temp:  [97.8 °F (36.6 °C)-98.7 °F (37.1 °C)] 97.8 °F (36.6 °C)  HR:  [70-72] 72  Resp:  [16] 16  BP: ()/(54-72) 138/54  SpO2:  [95 %-96 %] 95 %    Physical Exam    Gen: No acute distress,   HEENT: Moist mucus membranes,   Cardiovascular:  No edema  Pulmonary: Non-labored breathing  : No schmitt  GI:  non-distended.   MSK: R TMA  Integumentary: Skin is warm, dry. .  Neuro: Speech is intact. Appropriate to questioning.  Psych: Normal mood and affect.     DVT PPX: eliquis     Scheduled Meds:  Current Facility-Administered Medications   Medication Dose Route Frequency Provider Last Rate    acetaminophen  650 mg Oral Q6H PRN Annie L Dagnall, PA-C      albuterol  2 puff Inhalation Q4H PRN Annie L Dagnall, PA-C      apixaban  5 mg Oral BID Annie L Dagnall, PA-C      atenolol  25 mg Oral Daily Annie L Dagnall, PA-C      atorvastatin  40 mg Oral QPM Annie L Dagnall, PA-C      benzonatate  200 mg Oral TID Julianna Snyder MD      Budeson-Glycopyrrol-Formoterol  2 puff Inhalation Q12H [facility] Ilana Payne PA-C      cefepime  2,000 mg Intravenous Q8H Roshni Quiroga PA-C 2,000 mg (07/18/25 0546)    clopidogrel  75 mg Oral Daily Annie L Dagnall,  PA-C      famotidine  20 mg Oral Daily Annie L Dagnall, PA-C      glipiZIDE  5 mg Oral Daily With Breakfast Annie L Dagnall, PA-C      guaiFENesin  1,200 mg Oral Q12H [facility] Julianna Snyder MD      insulin lispro  1-5 Units Subcutaneous TID With Meals Annie L Dagnall, PA-C      lisinopril  2.5 mg Oral Daily Annie L Dagnall, PA-C      melatonin  3 mg Oral HS Julianna Snydre MD      metFORMIN  500 mg Oral Daily With Dinner Annie L Chelseynall, PA-C      midodrine  2.5 mg Oral TID AC Christian Hendrickson MD      montelukast  10 mg Oral HS Annie L Dagnall, PA-C      multivitamin-minerals  1 tablet Oral Daily Annie L Dagnall, PA-C      ondansetron  4 mg Oral Q6H PRN Annie L Dagnall, PA-C      oxyCODONE  2.5 mg Oral Q4H PRN Annie L Dagnall, PA-C      Or    oxyCODONE  5 mg Oral Q4H PRN Annie L Dagnall, PA-C      polyethylene glycol  17 g Oral Daily PRN Annie L Dagnall, PA-C      predniSONE  10 mg Oral Every Other Day Binh Hurd, DO      predniSONE  5 mg Oral Every Other Day Binh Hurd, DO      sodium chloride  2 spray Each Nare Q1H PRN Annie L Dagnall, PA-C           Lab Results: I have reviewed the following results:  Results from last 7 days   Lab Units 07/17/25  0627 07/14/25  0450   HEMOGLOBIN g/dL 9.2* 9.5*   HEMATOCRIT % 29.7* 30.6*   WBC Thousand/uL 7.50 7.03   PLATELETS Thousands/uL 235 265     Results from last 7 days   Lab Units 07/17/25  0627 07/14/25  0450   BUN mg/dL 36* 42*   SODIUM mmol/L 133* 132*   POTASSIUM mmol/L 4.5 4.1   CHLORIDE mmol/L 102 102   CREATININE mg/dL 0.82 0.79   AST U/L  --  12*   ALT U/L  --  13              Julianna Snyder MD   Physical Medicine and Rehabilitation   07/18/25    I have spent a total time of 37 minutes in caring for this patient on the day of the visit/encounter including Counseling / Coordination of care, Documenting in the medical record, and Communicating with other healthcare professionals .

## 2025-07-18 NOTE — PROGRESS NOTES
Progress Note - Gold Van 79 y.o. male MRN: [id number]    Unit/Bed#: [facility] 215-01 Encounter: [id number]        Subjective:   Patient seen and examined at bedside after reviewing the chart and discussing the case with the caring staff.      Patient examined at bedside.  Patient noticed to have evidence of orthostatic hypotension when patient's SBP dropped from 147 to 98 on ambulation this morning.  Although patient did state asymptomatic.  Currently patient is on midodrine 2.5 mg twice daily.    CXR done on 7/17/2025 showed no evidence of cardiopulmonary disease.    Physical Exam   Vitals: Blood pressure 138/54, pulse 72, temperature 97.8 °F (36.6 °C), temperature source Temporal, resp. rate 16, height 6' (1.829 m), weight 65.6 kg (144 lb 9.2 oz), SpO2 95%.,Body mass index is 19.61 kg/m².  Constitutional: Patient in no acute distress.  HEENT: PERR, EOMI, MMM.  Cardiovascular: Normal rate and regular rhythm.    Pulmonary/Chest: Effort normal and breath sounds normal.   Abdomen: Soft, + BS, NT.    Assessment/Plan:  Gold aVn is a(n) 79 y.o. 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 Galeas.      Cardiac with hx of HTN, HLD, PAF, CHF, cardiomyopathy, orthostatic hypotension.  Continue atenolol 25 mg daily, lisinopril 5 mg daily (decr from 5mg on 7/10), atorvastatin 40 mg daily, Eliquis 5 mg twice daily, increased midodrine 2.5 mg 3 times daily 7/18/2025 (started twice daily on 7/10).  Daily weights.  Apply abd binder and JOSEFA stockings.  T2DM.  Hgb A1c 6.6% on 6/18/25.  Home: Glucotrol XL 10 mg twice daily and metformin  mg daily.  Here: metformin  mg daily (restarted 7/6) and Glucotrol XL 5mg daily (restarted 7/15 however decr from BID to daily 7/17 due to hypoglycemia).  D/c Humalog 4u TID on 7/15, d/c Lantus 12u nightly on 7/16.  SSI with accuchecks ac.  Carb controlled diet.    Hx CVA.  10/2024.  Continue Plavix, Eliquis and statin.  PAD.  S/p R CFA/SFA  endarterectomy/stenting on 5/13/25.  Continue Plavix and statin.  GERD.  Patient is on famotidine 20 mg daily.    COPD/asthma.  Continue home Breztri (brought from home), Singulair 10 mg nightly, albuterol inhaler as needed.  CKD stage 2.  Stable.  Cr 0.77 -> 0.76 -> 0.79 -> 0.82 on 7/17.  Peak Cr 1.56.  Avoid nephrotoxins.  Cont to monitor.  Chronic generalized pruritus.  Continue prednisone 5 mg daily.    Chronic hyponatremia.  Level 132 -> 132 -> 133 on 7/17.  Asymptomatic.  Cont to trend.    Anemia.  Stable.  Hgb 9.2 -> 9.5 -> 9.2 on 7/17.  Cont to trend.   Cough.  Ongoing x weeks.  CXR on 6/17 negative.  Start Mucinex 600 mg q12h 7/6.  Tessalon perles changed to TID 7/17.  Repeat CXR ordered 7/17.   MSSA bacteremia.  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  Now s/p right TMA.  Patient is on IV cefepime 2g q8h for 6 weeks (thru 8/6/25).  RUE PICC placed 6/30 and to be removed by RN after final dose of antibiotics.  Weekly CBCD and creatinine while on IV abx.  Pain and bowel regimen.  As per physiatry.  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  OR cultures grew serratia, pseudomonas, MSSA.  Patient is on IV cefepime 2g q8h for 6 weeks (thru 8/6/25).  Saw podiatry 7/8, sutures removed, full heel weight bearing with surgical shoe.  Saw podiatry 7/16 for surgical debridement.  He is receiving intensive PT OT with management as per physiatry.      Anticipated date of discharge.  Tuesday 7/22/25

## 2025-07-18 NOTE — ASSESSMENT & PLAN NOTE
Wt Readings from Last 3 Encounters:   07/18/25 65.6 kg (144 lb 9.2 oz)   06/26/25 62.1 kg (137 lb)   06/10/25 67.1 kg (148 lb)   6/26 Echo with EF 50-55%, Diastolic function WNL  Follows with Dr. Nelson  Home: patient declined diuretic regimen, is on Lisinopril 5mg daily  > Here: Lisinopril 5mg daily  > Monitor volume status, lytes  > I/O, daily weights.  > Management as per IM

## 2025-07-18 NOTE — ASSESSMENT & PLAN NOTE
At home on prednisone 5mg daily  Continued here, but at risk for poor blood sugar control and infection  Monitor for now.    Labs/Imaging Studies

## 2025-07-18 NOTE — NURSING NOTE
[facility]  RN Shift Note        Vitals  Vital Signs  Temperature: 97.8 °F (36.6 °C)  Temp Source: Temporal  Pulse: 72  Heart Rate Source: Monitor  Respirations: 16  Blood Pressure: 138/54  MAP (mmHg): 86  BP Location: Left arm  BP Method: Manual  Patient Position - Orthostatic VS: Sitting        Follow up/Interventions- midodrine given am and lunch   Pain Pain Score: 0  Hospital Pain Intervention(s): Medication (See MAR)    Follow up/Interventions-    GI   (WNL/X)  LBM  Incontinence (yes/no)     Gastrointestinal (WDL): Within Defined Limits  Last BM Date: 07/16/25  Bowel Incontinence: No    Follow up/Interventions-       (WNL/X)  Incontinence (yes/no)  Bladder Scan  Urine Output  Shift Total Output  Unmeasured Occurrence   Genitourinary (WDL): Within Defined Limits  Urinary Incontinence: No    Urine: 200 mL    Unmeasured Urine Occurrence: 1      Follow up/Interventions-    Nutrition  Diet/Precautions   PO Intake  Meal Consumption   Nutrition  Feeding: Able to feed self  Diet Type: Diabetic  Appetite: Good  P.O.: 300 mL  Percent Meals Eaten (%): 100      Strategies/Interventions-   Follow up-    LDA  Drain Output             Follow up/Interventions-    Skin   (WNL/X)  Integrity  Pressure Injury YES/NO: yes     Integumentary (WDL): Exceptions to WDL  Skin Integrity: Bruising  Description- sacral wound- b/l elbows pink/blanchable    Follow up/Interventions- cleansed, applied triad paste and redressed foam dressing to sacral wound. Applied new foam dressings to b/l elbows.    Behavior/Mood  Behavior log YES/NO: no Patient Behaviors/Mood: Calm, Cooperative    Follow up/Interventions-    Sleep Pattern  Sleep log         Discharge Planning Education  (Medication/Device/Injections/Etc)     Wife to come in for education and redress incision site to right tma site and flush picc line    ADL/Mobility Summary  (transfer, bed mobility, ambulation)   Supervision xfr from bed to wc/ sit to stand with RW      Miscellaneous

## 2025-07-18 NOTE — PROGRESS NOTES
07/18/25 1030   Pain Assessment   Pain Assessment Tool 0-10   Pain Score No Pain   Restrictions/Precautions   Precautions Bed/chair alarms;Fall Risk;Pain;Multiple lines;Limb alert   Weight Bearing Restrictions Yes   RLE Weight Bearing Per Order WBAT  (through heel with sx shoe)   Braces or Orthoses Other (Comment)  (sx shoe RLE)   Eating   Type of Assistance Needed Independent   Eating CARE Score 6   Upper Body Dressing   Type of Assistance Needed Set-up / clean-up   Comment Pt cued to sit down while putting on zip up jacket due to LOB while standing, required CGA for correction   Upper Body Dressing CARE Score 5   Dressing/Undressing Clothing   Don UB Clothes Button Shirt   Limitations Noted In Balance;Endurance;Safety;Sequencing;Strength;ROM   Sit to Stand   Type of Assistance Needed Supervision   Comment RW   Sit to Stand CARE Score 4   Bed-Chair Transfer   Type of Assistance Needed Supervision   Chair/Bed-to-Chair Transfer CARE Score 4   Toileting Hygiene   Type of Assistance Needed Set-up / clean-up   Toileting Hygiene CARE Score 5   Toileting   Able to Pull Clothing down yes, up yes.   Manage Hygiene Bladder   Limitations Noted In Balance;ROM;Safety;UE Strength;LE Strength   Adaptive Equipment Grab Bar   Toilet Transfer   Type of Assistance Needed Supervision   Toilet Transfer CARE Score 4   Toilet Transfer   Surface Assessed Raised Toilet   Transfer Technique Standard   Limitations Noted In Balance;Endurance;ROM;Safety;UE Strength;LE Strength   Adaptive Equipment Grab Bar;Walker   Kitchen Mobility   Kitchen-Mobility Level Walker   Kitchen Activity Retrieve items;Transport items   Kitchen Mobility Comments Pt able to retrieve and transport 18 cones in kitchen on various surfaces/levels with CGA and RW. Pt had fair+ dynamic standing balance throughout. OTS educated pt on energy conservation techniques when returning home.   Light Housekeeping   Light Housekeeping Level Other (Comment)  (Standing at table)    Light Housekeeping Level of Assistance Modified independent   Light Housekeeping Pt able to match pairs of socks while standing and using table for support with CGA. Pt noted to have good dynamic standing balance while folding and gathering socks. OTS educated pt on energy conservation techniques when returning home.   Functional Standing Tolerance   Time 3m 8s   Activity Folding socks   Comments Fair+ dynamic standing balance with CGA   Exercise Tools   UE Ergometer Mod resistance 6m forward, 6m backward   Other Exercise Tool 1 2# therabar 2x15 bilat UE strengthening shoulder elevation/depression, shoulder flexion/extension, chest presses, shoulder internal/external rotation, circumduction, elbow flexion/extension, wrist flexion/extension   Cognition   Overall Cognitive Status WFL   Arousal/Participation Alert;Responsive;Cooperative   Attention Within functional limits   Orientation Level Oriented X4   Memory Decreased recall of precautions   Following Commands Follows one step commands without difficulty   Additional Activities   Additional Activities Other (Comment)   Additional Activities Comments Fxl mobility in hallways using RW with CGA   Other Comments   Assessment Pt particpated in 80 minutes concurrent tx as another patient with similar goals.   Assessment   Treatment Assessment Pt agreeable to OT session this AM; received sitting upright in w/c. ADLs, IADLs, fxl mobility, and therex session completed; current LOF and details listed in respective sections. Pt overall independent eating; set-up UB dressing, toileting hygiene; supervision transfers; CGA fxl mobility in hallway using RW. OTS and pt practiced IADLs to prepare for d/c home which pt completed well. OTS educated pt on energy conservation techniques when returning home. Pt followed ADL/IADLs with therex in which he tolerated well with no c/o pain or fatigue. Pt's barriers impacting performance include decreased strength, decreased ROM, decreased  endurance, impaired balance, decreased safety awareness, decreased skin integrity, and pain. Pt to benefit from continued skilled OT services to address listed barriers and prepare for safe d/c home.   Prognosis Good   Problem List Decreased strength;Decreased range of motion;Decreased endurance;Impaired balance;Pain;Orthopedic restrictions;Decreased skin integrity;Decreased safety awareness   Plan   Treatment/Interventions ADL retraining;Functional transfer training;Therapeutic exercise;Endurance training;Patient/family training;Equipment eval/education;Compensatory technique education;OT   Progress Progressing toward goals   OT Therapy Minutes   OT Time In 1030   OT Time Out 1200   OT Total Time (minutes) 90   OT Mode of treatment - Individual (minutes) 10   OT Mode of treatment - Concurrent (minutes) 80   Therapy Time missed   Time missed? No

## 2025-07-18 NOTE — ASSESSMENT & PLAN NOTE
Lab Results   Component Value Date    HGBA1C 6.6 (H) 06/18/2025       Recent Labs     07/17/25  1120 07/17/25  1621 07/17/25  2110 07/18/25  0810   POCGLU 75 236* 207* 69       Blood Sugar Average: Last 72 hrs:  (P) [id number]: Glipizide 10mg BID, Linagliptin 5mg daily (listed as not taking), Metformin 500mg at dinner   Here: Lantus 12 units QHS, Lispro 4 units TID with meals, CDI/Accuchecks.  Will need to transition to or optimize home meds, or else patient will need to diabetes kit/education.  Management as per IM.

## 2025-07-18 NOTE — ASSESSMENT & PLAN NOTE
2/2 nonhealing wound, presented in septic shock.  S/p TMA on 6/23 by Dr. Galeas  Unclear if source control  OR Cultures: Serratia, MSSA, Pseudomonas  Blood cultures 6/26 with NGTD  > Per ID continue Cefepime through 8/6 for 6 weeks treatment  > PICC placed on 6/30  > Monitor labs at least weekly for toxicities  > Pain management  > Close incisional monitoring and care.  > NWB RLE  > PT/OT 3-5 hours/day, 5-7 days/week  > f/u Podiatry ~ 7/22 9:00 a   Patient f/u w/ Dr. Galeas in 7/8: sutures removed; will need weekly debridement; full heel weight bearing and avoid heel - to toe gait in surgical shoe    7/15: improved healing at dehiscence area; surgical debridement performed  > outpatient f/u with ID   - 7/22 Home PT/OT/RN; family training for IV management has been completed and will continue over the weekend for repetition

## 2025-07-18 NOTE — ASSESSMENT & PLAN NOTE
Home: Breztri BID, Albuterol PRN, Singulair nightly  Here: Budesonide-Glycopyrrol-Formoterol BID, Singulair QHS, Albuterol PRN; scheduled tessalon pearls    No acute exacerbation    Management as per IM  F/u [facility]  CXR 7/17: negative for acute cardiopulm disease

## 2025-07-18 NOTE — ASSESSMENT & PLAN NOTE
S/p R CFA/SFA endarterectomy w/ bovine pericardial patch, DCB and stenting of SFA, and angio of AT 5/13/25 ([facility])   > Plavix, Statin, Eliquis  > Monitor neurovascular exam at least daily  > f/u Vascular surgery

## 2025-07-18 NOTE — PROGRESS NOTES
07/17/25 1230   Pain Assessment   Pain Assessment Tool 0-10   Pain Score No Pain   Restrictions/Precautions   Precautions Bed/chair alarms;Fall Risk;Pain;Multiple lines;Limb alert   LUE Weight Bearing Per Order WBAT  (through heel with sx shoe)   Braces or Orthoses   (RLE surgical shoe)   Cognition   Overall Cognitive Status WFL   Arousal/Participation Alert;Responsive;Cooperative   Attention Within functional limits   Orientation Level Oriented X4   Memory Decreased recall of precautions   Following Commands Follows one step commands without difficulty   Roll Left and Right   Type of Assistance Needed Independent   Roll Left and Right CARE Score 6   Sit to Lying   Type of Assistance Needed Supervision   Sit to Lying CARE Score 4   Lying to Sitting on Side of Bed   Type of Assistance Needed Supervision   Lying to Sitting on Side of Bed CARE Score 4   Sit to Stand   Type of Assistance Needed Supervision   Comment RW   Sit to Stand CARE Score 4   Bed-Chair Transfer   Type of Assistance Needed Supervision;Verbal cues   Comment close S/S with RW   Chair/Bed-to-Chair Transfer CARE Score 4   Car Transfer   Type of Assistance Needed Supervision;Verbal cues   Comment close S/S in/out of car using RW and door frame of car for support;  wife and patient feel comfortable car transferl   Car Transfer CARE Score 4   Walk 10 Feet   Type of Assistance Needed Supervision;Verbal cues   Comment close S/S level and unlevel surfaces with RW; cues to keep feet inside walker   Walk 10 Feet CARE Score 4   Walk 50 Feet with Two Turns   Type of Assistance Needed Supervision;Verbal cues   Comment close S/S level and unlevel surfaces with RW; cues to keep feet inside walker   Walk 50 Feet with Two Turns CARE Score 4   Walk 150 Feet   Type of Assistance Needed Supervision;Verbal cues   Comment close S/S level and unlevel surfaces with RW; cues to keep feet inside walker   Walk 150 Feet CARE Score 4   Walking 10 Feet on Uneven Surfaces    Type of Assistance Needed Supervision;Verbal cues   Comment close S/S level and unlevel surfaces with RW; cues to keep feet inside walker   Walking 10 Feet on Uneven Surfaces CARE Score 4   Ambulation   Primary Mode of Locomotion Prior to Admission Walk   Distance Walked (feet) 218 ft  (218' 101' 137' 156')   Assist Device Roller Walker   Gait Pattern Inconsistant Marsha;Slow Marsha;Decreased foot clearance;Narrow KADIE   Limitations Noted In Balance;Endurance;Safety;Strength   Provided Assistance with: Balance   Walk Assist Level Close Supervision;Supervision   Findings close S/S level and unlevel surfaces with RW; cues to keep feet inside walker   Does the patient walk? 2. Yes   Curb or Single Stair   Style negotiated Single stair   Type of Assistance Needed Incidental touching;Supervision;Verbal cues   Comment cg/close S 14 steps with 2 handrails; reciprocal gait pattern   1 Step (Curb) CARE Score 4   4 Steps   Type of Assistance Needed Incidental touching;Supervision;Verbal cues   Comment cg/close S 14 steps with 2 handrails; reciprocal gait pattern   4 Steps CARE Score 4   12 Steps   Type of Assistance Needed Incidental touching;Supervision;Verbal cues   Comment cg/close S 14 steps with 2 handrails; reciprocal gait pattern   12 Steps CARE Score 4   Stairs   Type Stairs;Ramp   # of Steps 14   Weight Bearing Precautions WBAT;Fall Risk  (heel weight bearing RLE in surgical shoe)   Assist Devices Bilateral Rail;Roller Walker   Findings cg/close S 14 steps with 2 handrails; reciprocal gait pattern; CG ramp with walker   Toilet Transfer   Type of Assistance Needed Supervision   Comment S RW/grab bar   Toilet Transfer CARE Score 4   Therapeutic Interventions   Strengthening seated ther ex   Balance gait and transfer (from multiple surfaces) training   Other ramp and stair negotiation   Assessment   Treatment Assessment Patient agreeable to therapy session.   No pain reported.  Pt's spouse, Stacia, attended therapy  session to begin family training.  Reviewed gait, transfers, ramp and stair negotiation.  Patient and spouse comfortable with DAKOTA needed.  Stacia is coming tomorrow for more hands on training.   She reports they will have all DME recommended, including 2 walkers (1 for community that she can keep in the car and 1 for main living area).   Also recommend, that if there is room on the landing between sets of steps, to put a chair/seat there in case patient would need to sit down.   Patient completed ther ex for general LE strengthening; gait and transfer training focusing on sequence and technique for improved balance and safety with functional mobility using walker.  Negoitated steps with 2 handrails cg/close S; ramp with RW CGA.    Patient appropriate for concurrent therapy to increase motivation and encouragement amont pts with similar deficits.  Patient returned to room in recliner with alarms on and all needs within reach.   PT Barriers   Physical Impairment Decreased strength;Decreased endurance;Impaired balance;Decreased mobility;Decreased coordination;Orthopedic restrictions;Pain   Functional Limitation Car transfers;Ramp negotiation;Stair negotiation;Standing;Transfers;Walking;Wheelchair management   Plan   Treatment/Interventions Functional transfer training;LE strengthening/ROM;Elevations;Therapeutic exercise;Bed mobility;Compensatory technique education   Progress Progressing toward goals   PT Therapy Minutes   PT Time In 1230   PT Time Out 1400   PT Total Time (minutes) 90   PT Mode of treatment - Individual (minutes) 65   PT Mode of treatment - Concurrent (minutes) 25   PT Mode of treatment - Group (minutes) 0   PT Mode of treatment - Co-treat (minutes) 0   PT Mode of Treatment - Total time(minutes) 90 minutes   PT Cumulative Minutes 791   Therapy Time missed   Time missed? No

## 2025-07-18 NOTE — ASSESSMENT & PLAN NOTE
POA to [facility]  > Continue local wound care to pretibial wounds  Skin Care orders 7/15:  1-Hydraguard to Left heels 2 times per day and as needed    2-EHOB/WAFFLE or Roho  cushion when out of bed in chair.  3-Moisturize skin daily with skin nourishing cream  4-Elevate heels to offload pressure. EHOB off loading boots when in bed   5-Turn/reposition every 2 hours to offload and prevent pressure   6. Apply silicone foam to the right heel ramone with a P an ddate peel and check skin integrity every shift change every 3 days   7. Cleanse sacrum, buttocks with soap and water apply triad paste to the wound on the right buttocks only then a silicone foam rmaone with a T and date check skin integrity every shift and change every other day or when soiled   8. P 500 low air loss mattress   9. Dr. 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

## 2025-07-18 NOTE — NURSING NOTE
[facility]  RN Shift Note        Vitals  Vital Signs  Temperature: 97.8 °F (36.6 °C)  Temp Source: Temporal  Pulse: 72  Heart Rate Source: Monitor  Respirations: 16  Blood Pressure: 138/54  MAP (mmHg): 86  BP Location: Left arm  BP Method: Manual  Patient Position - Orthostatic VS: Sitting        Follow up/Interventions-    Pain Pain Score: 0  Hospital Pain Intervention(s): Medication (See MAR)    Follow up/Interventions-    GI   (WNL/X)  LBM  Incontinence (yes/no)     Gastrointestinal (WDL): Within Defined Limits  Last BM Date: 07/16/25  Bowel Incontinence: No    Follow up/Interventions-       (WNL/X)  Incontinence (yes/no)  Bladder Scan  Urine Output  Shift Total Output  Unmeasured Occurrence   Genitourinary (WDL): Within Defined Limits  Urinary Incontinence: No    Urine: 200 mL    Unmeasured Urine Occurrence: 1      Follow up/Interventions-    Nutrition  Diet/Precautions   PO Intake  Meal Consumption   Nutrition  Feeding: Able to feed self  Diet Type: Diabetic  Appetite: Good  P.O.: 300 mL  Percent Meals Eaten (%): 100      Strategies/Interventions-   Follow up-    LDA  Drain Output             Follow up/Interventions-    Skin   (WNL/X)  Integrity  Pressure Injury YES/NO: yes     Integumentary (WDL): Exceptions to WDL  Skin Integrity: Bruising  Description     Follow up/Interventions- new round brown scabbed area noted on back of right heel during dressing change.  Photo taken and added to LDA Avatar.  MD informed and assessed same at bedside.     Behavior/Mood  Behavior log YES/NO: no Patient Behaviors/Mood: Calm, Cooperative    Follow up/Interventions-    Sleep Pattern  Sleep log         Discharge Planning Education  (Medication/Device/Injections/Etc)     Education with spouse continued at [facility] on wound care and PICC flushing.  She was able to perform same with supervision and verbal cues from nurse.  Appeared more comfortable with procedures today as she was educated and participated in  them yesterday.     ADL/Mobility Summary  (transfer, bed mobility, ambulation)        Miscellaneous

## 2025-07-18 NOTE — DISCHARGE INSTR - AVS FIRST PAGE
DISCHARGE INSTRUCTIONS: [facility] [place]  Please    Bring these instructions with you to your Outpatient Physician appointments so they can order and follow-up any additional lab work or imaging recommended at time of discharge.    Resume follow-up with all your prior providers that you have established care prior to this hospitalization.  Discuss with primary care physician (PCP) if you have additional questions.     It  is you or your caregivers responsibility to obtain follow-up MEDICATION REFILLS  As indicated through your Primary Care Physician (PCP) and other outpatient specialty provider(s) after discharge.  Please follow-up with your PCP as soon as possible after discharge to set-up follow-up management and when appropriate refills.      You remain a fall and injury risk which could be severe.  - Your risk of fall has decreased however since admission to acute rehab.    - Appropriate supervision +/- assistance as instructed during your rehab course is recommended to decrease risk of fall and injury.    - Continue skilled therapy as discussed after discharge to further decrease this risk    If you (or your health care proxy) have any questions or concerns regarding your acute rehabilitation stay including issues with medications, rehabilitation, and follow-up plan, please call:            [facility] Unit at [facility] at [phone]    Should you develop fevers, chills, new weakness, changes in sensation, difficulty speaking, facial weakness, confusion, shortness of breath, chest pain, or other concerning symptoms please call 911 and/or obtain transportation to nearest ER immediately.    Should you develop worsening pain, swelling, or drainage notify your surgeon right away or obtain transportation to nearest ER for evaluation.    PHYSICIANS to see:  Please see your doctors listed in the follow up providers section of your  discharge paperwork, and take the discharge paperwork with you to your appointments.    Home health has been ordered for you:  Your home health agency should reach out to you or your family soon to arrange follow-up.    (If [facility] is your provider their phone number is [phone])    If you are unable to get in touch with home health you may contact your  at [phone]. [place]    SKIN CARE INSTRUCTIONS to follow:  Monitor incision(s) for increased redness, swelling, pain/tenderness, discharge/pus and promptly notify your surgeon should these develop.  - Should you develop significant pain, swelling, or drainage obtain transportation to nearest emergency room for immediate evaluation if unable to reach your surgeon promptly   - Should you develop uncontrolled pain, fever, chills, sweats, changes in strength, sensation, or color of this area obtain transportation to nearest emergency room for immediate evaluation.      Monitor skin for increased redness or breadown and promptly notify your physician should these develop    If instructed while in [facility] - be sure you stand +/- walk every 1-2 hours and if advised use appropriate supervision/assistance to optimally offload your buttock/sacral region.  While seated or lying in bed shift positions and from side to side often.  Can use barrier type cream such as Hydragaurd 2 times per day and as needed.    Turn patient (yourself) fully every 2 hours while in bed.      WOUND CARE INSTRUCTIONS to follow:  Home health nursing will assist you with wound management.  You may contact them with issues as well once this service is set-up.    If [facility] is your provider their phone number is [phone].    If you are unable to get in touch with home health you may contact your  at [phone].      Due to the following you are at increased risk of skin breakdown/wounds/worsening wounds:  - Impaired mobility  - Medical  co-morbidities    Buttocks/Sacrum  Turn as full as possible off sacrum/buttocks every 2 hours  Use Cushion while in chair or wheelchair  Weight shift every 10-15 minutes while in chair  Keep skin clean and dry as possible.  Remove wet or soiled clothing/linens promptly  Use barrier cream or similar 2 times per day   Monitor skin for increased breakdown which you are at risk of and notify nursing, PCP, or other physician providers should this occur right away    Heels/bony edges of feet  Float heels off edge of pillow for added pressure relief.  Monitor heels and bony protuberances and notify physician or nursing for any increased redness, bogginess, or breakdown    Skin Care orders:  1-Hydraguard to Left heels 2 times per day and as needed    2-EHOB/WAFFLE or Roho  cushion when out of bed in chair.  3-Moisturize skin daily with skin nourishing cream  4-Elevate heels to offload pressure. EHOB off loading boots when in bed   5-Turn/reposition every 2 hours to offload and prevent pressure   6. Apply silicone foam to the right heel ramone with a P an ddate peel and check skin integrity every shift change every 3 days   7. Cleanse sacrum, buttocks with soap and water apply delfina cream to the wound on the right buttocks twice a day and as needed if soiled  8. P 500 low air loss mattress   9. Dr. 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       Primary management of your Right TMA wound(s) is Dr Galeas.  Please contact this service with follow-up issues/concerns.  Right Foot Surgical Wound     Wash your hands with soap and water.  Remove old dressing, discard into plastic bag and place in trash.  Cleanse the wound with vashe wound cleanser if available (if not available may cleanse with unscented soap and water)  prior to applying a clean dressing. Do not use tissue or cotton balls. Do not scrub the wound. Pat dry using gauze.  Shower no     Perform prophase soaks for 5 minutes prior to each dressing change. A bottle was  given to you at your appointment.   Apply Acticoat 3 into the foot wound.  Cover with ABD pad.   Secure with becki and tape.  Change dressing every other day    Surgical shoe to right foot   May ambulate only in surgical shoe.         Increase protein in your diet with each meal. 3 to 4 servings per day Meat, chicken, eggs, nut, greek yogurt, legumes, and  lentils are good sources of protein. Isaac protein shakes      Monitor for any signs or symptoms of infection such as increase redness or pain, fever/chills, nausea/vomiting, malodor, or  increased drainage. If you have any signs or symptoms please call the wound center, if after hours or on holiday/weekend call your primary care provider or go to the emergency department to be evaluated.     Please call the Wound Management Center Monday through Friday between 8-430 for any questions.        Ensure appropriate wound care to optimize chances for healing and decrease risks of complications.    Your wound(s) remains at risk for worsening and infection.    - Should you develop significant pain, swelling, or drainage obtain transportation to nearest emergency room for immediate evaluation if unable to reach your surgeon promptly   - Should you develop uncontrolled pain, fever, chills, sweats, changes in strength, sensation, or color of this area obtain transportation to nearest emergency room for immediate evaluation.      WEIGHTBEARING/ACTIVITY PRECAUTIONS to follow:    weight bearing as tolerated with surgical shoe    Driving restrictions:    You are recommended against driving until cleared by an outpatient physician.      **You should NOT operate a motor vehicle while under the influence of an opiate medication, muscle relaxant, or other sedative.  Doing so may lead to an accident resulting in serious injury or death to yourself or others.  You have agreed to avoid driving when under the influence of this medication.       ------------------------------------------------------------------------------------------------------------  ------------------------------------------------------------------------------------------------------------    MEDICATIONS:  Please see a full list of your medications outlined in the After Visit Summary that is attached to these Discharge Instructions.  Please note changes may have been made to your medications please refer to your discharge paperwork for your current medications and take this list with you to all your doctors appointments for your doctors to review.  Please do not resume a home medication unless the medication reconciliation sheet indicates to do so, please do not assume that a medication that you were given a prescription for is the same as a medication you have at home based on both medications having the same name as dosages and frequency may have changed.      Unless specifically noted in your medication list provided to you in your discharge paper work do not resume prior vitamins, minerals, or supplements you may have been taking prior to your hospitalization unless instructed by an outpatient physician in the future.  Discuss with your primary care at next visit if applicable.      NSAID Warning:  Please avoid NSAID (including but not limited to advil, aleve, motrin, naproxen, ibuprofen, mobic, meloxicam, diclofenac etc) medications as NSAID medications may increase your risk of bleeding (which can be life-threatening), stroke, worsening kidney disease, heart attack, developing/worsening GI ulcers, worsening heart failure, worsening liver (cirrhosis) disease, potentially delay bone healing.      ------------------------------------------------------------------------------------------------------------  ------------------------------------------------------------------------------------------------------------          Please note a summary of your hospital stay with relevant  information for your doctors will try to be sent to them.  Please confirm with your doctors at your follow up visits that they have received this summary and have them contact [facility] Medical Records if they have not received them along with any other medical records they may require.     Main  [facility] Phone Number:  [phone]

## 2025-07-18 NOTE — PROGRESS NOTES
07/18/25 1230   Pain Assessment   Pain Assessment Tool 0-10   Pain Score No Pain   Restrictions/Precautions   Precautions Bed/chair alarms;Fall Risk;Pain;Multiple lines;Limb alert   RLE Weight Bearing Per Order WBAT  (through heel with sx shoe)   Braces or Orthoses   (surgical shoe R LE)   Cognition   Overall Cognitive Status WFL   Arousal/Participation Alert;Responsive;Cooperative   Attention Within functional limits   Orientation Level Oriented X4   Memory Decreased recall of precautions   Following Commands Follows one step commands without difficulty   Sit to Stand   Type of Assistance Needed Supervision;Verbal cues   Comment S with RW   Sit to Stand CARE Score 4   Bed-Chair Transfer   Type of Assistance Needed Supervision;Verbal cues   Comment S with RW   Chair/Bed-to-Chair Transfer CARE Score 4   Walk 10 Feet   Type of Assistance Needed Supervision;Verbal cues   Comment close S/S level and unlevel surfaces with RW   Walk 10 Feet CARE Score 4   Walk 50 Feet with Two Turns   Type of Assistance Needed Supervision;Verbal cues   Comment close S/S level and unlevel surfaces with RW   Walk 50 Feet with Two Turns CARE Score 4   Walk 150 Feet   Type of Assistance Needed Supervision;Verbal cues   Comment close S/S level and unlevel surfaces with RW   Walk 150 Feet CARE Score 4   Walking 10 Feet on Uneven Surfaces   Type of Assistance Needed Supervision;Verbal cues   Comment close S/S level and unlevel surfaces with RW   Walking 10 Feet on Uneven Surfaces CARE Score 4   Ambulation   Primary Mode of Locomotion Prior to Admission Walk   Distance Walked (feet) 188 ft  (135' 126' 72' 188')   Assist Device Roller Walker   Gait Pattern Slow Marsha;Narrow KADIE   Limitations Noted In Balance;Device Management;Endurance;Safety;Strength   Provided Assistance with: Balance   Walk Assist Level Close Supervision;Supervision   Findings close S/S level and unlevel surfaces with RW   Does the patient walk? 2. Yes   Curb or Single  Stair   Style negotiated Single stair   Type of Assistance Needed Supervision;Verbal cues   Comment close S/S 14 steps with B/L handrails;   1 Step (Curb) CARE Score 4   4 Steps   Type of Assistance Needed Supervision;Verbal cues   Comment close S/S 14 steps with B/L handrails;   4 Steps CARE Score 4   12 Steps   Type of Assistance Needed Supervision;Verbal cues   Comment close S/S 14 steps with B/L handrails;   12 Steps CARE Score 4   Stairs   Type Stairs;Ramp   # of Steps 14   Weight Bearing Precautions WBAT;Fall Risk   Assist Devices Bilateral Rail;Roller Walker   Findings close S/S 14 steps with B/L handrails; cg/close S ramp with RW   Therapeutic Interventions   Strengthening seated and standing ther ex   Balance gait and transfer training   Other ramp and stair negotiation   Assessment   Treatment Assessment Patient agreeable to therapy session.  No pain reported.   Cues for general safety.   Pt's wife in for hand's on training.  Wife able to safely assist and give cues to complete gait, transfers, ramp and stair negotiation.  Wife reports all DME is in place.  She is going to place bedside commode at foot of bed for overnight use vs getting up to go into bathroom.    Patient and wife comfortable with all functional mobility.    Completed ther ex for general LE strengthening; gait and transfer training focusing on sequence and technique for improved balance and safety with functional mobility using walker.  Negotated steps with 2 handrails and ramp with RW.  Returned to room in recliner with alarms on and all needs within reach.   PT Barriers   Physical Impairment Decreased strength;Decreased endurance;Impaired balance;Decreased mobility;Decreased coordination;Orthopedic restrictions;Pain   Functional Limitation Car transfers;Ramp negotiation;Stair negotiation;Standing;Transfers;Walking;Wheelchair management   Plan   Treatment/Interventions Functional transfer training;LE  strengthening/ROM;Elevations;Therapeutic exercise;Gait training   Progress Progressing toward goals   PT Therapy Minutes   PT Time In 1230   PT Time Out 1400   PT Total Time (minutes) 90   PT Mode of treatment - Individual (minutes) 90   PT Mode of treatment - Concurrent (minutes) 0   PT Mode of treatment - Group (minutes) 0   PT Mode of treatment - Co-treat (minutes) 0   PT Mode of Treatment - Total time(minutes) 90 minutes   PT Cumulative Minutes 881   Therapy Time missed   Time missed? No

## 2025-07-18 NOTE — PLAN OF CARE
Problem: PAIN - ADULT  Goal: Verbalizes/displays adequate comfort level or baseline comfort level  Description: Interventions:  - Encourage patient to monitor pain and request assistance  - Assess pain using appropriate pain scale  - Administer analgesics as ordered based on type and severity of pain and evaluate response  - Implement non-pharmacological measures as appropriate and evaluate response  - Consider cultural and social influences on pain and pain management  - Notify physician/advanced practitioner if interventions unsuccessful or patient reports new pain  - Educate patient/family on pain management process including their role and importance of  reporting pain   - Provide non-pharmacologic/complimentary pain relief interventions  Outcome: Progressing     Problem: INFECTION - ADULT  Goal: Absence or prevention of progression during hospitalization  Description: INTERVENTIONS:  - Assess and monitor for signs and symptoms of infection  - Monitor lab/diagnostic results    - [place] appropriate cooling/warming therapies per order  - Administer medications as ordered  - Instruct and encourage patient and family to use good hand hygiene technique  - Identify and instruct in appropriate isolation precautions for identified infection/condition  Outcome: Progressing     Problem: SAFETY ADULT  Goal: Patient will remain free of falls  Description: INTERVENTIONS:  - Educate patient/family on patient safety including physical limitations  - Instruct patient to call for assistance with activity   - OT/PT to assist with strengthening/mobility   - Keep Call bell within reach  - Keep bed low and locked with side rails adjusted as appropriate  - Keep care items and personal belongings within reach  - Initiate and maintain comfort rounds  - Make Fall Risk Sign visible to staff    - Apply yellow socks and bracelet for high fall risk patients  - Consider moving patient to room near nurses station  Outcome: Progressing      Problem: Prexisting or High Potential for Compromised Skin Integrity  Goal: Skin integrity is maintained or improved  Description: INTERVENTIONS:  - Identify patients at risk for skin breakdown  - Assess and monitor skin integrity including under and around medical devices   - Assess and monitor nutrition and hydration status  - Monitor labs  - Assess for incontinence   - Turn and reposition patient  - Assist with mobility/ambulation  - Relieve pressure over joycelyn prominences   - Avoid friction and shearing  - Provide appropriate hygiene as needed including keeping skin clean and dry  - Evaluate need for skin moisturizer/barrier cream  - Collaborate with interdisciplinary team  - Patient/family teaching  - Consider wound care consult    Assess:  - Review Sae scale daily  - Clean and moisturize skin every day   - Inspect skin when repositioning, toileting, and assisting with ADLS  - Assess extremities for adequate circulation and sensation     Bed Management:  - Have minimal linens on bed & keep smooth, unwrinkled  - Change linens as needed when moist or perspiring  - Toileting:  - Offer bedside commode    Activity:  - Mobilize patient 3 times a day  - Encourage activity and walks on unit  - Encourage or provide ROM exercises   - Turn and reposition patient every 2 Hours  - Use appropriate equipment to lift or move patient in bed  - Instruct/ Assist with weight shifting every hour  when out of bed in chair  - Consider limitation of chair time 3 hour intervals    Skin Care:  - Avoid use of baby powder, tape, friction and shearing, hot water or constrictive clothing  - Do not massage red bony areas    Next Steps:    Outcome: Progressing     Problem: Nutrition/Hydration-ADULT  Goal: Nutrient/Hydration intake appropriate for improving, restoring or maintaining nutritional needs  Description: Monitor and assess patient's nutrition/hydration status for malnutrition. Collaborate with interdisciplinary team and initiate  plan and interventions as ordered.  Monitor patient's weight and dietary intake as ordered or per policy. Utilize nutrition screening tool and intervene as necessary. Determine patient's food preferences and provide high-protein, high-caloric foods as appropriate.     INTERVENTIONS:  - Monitor oral intake, urinary output, labs, and treatment plans  - Assess nutrition and hydration status and recommend course of action  - Evaluate amount of meals eaten  - Assist patient with eating if necessary   - Allow adequate time for meals  - Recommend/ encourage appropriate diets, oral nutritional supplements, and vitamin/mineral supplements  - Order, calculate, and assess calorie counts as needed  - Recommend, monitor, and adjust tube feedings and TPN/PPN based on assessed needs  - Assess need for intravenous fluids  - Provide specific nutrition/hydration education as appropriate  - Include patient/family/caregiver in decisions related to nutrition  Outcome: Progressing

## 2025-07-19 NOTE — PROGRESS NOTES
Progress Note - Gold Van 79 y.o. male MRN: [id number]    Unit/Bed#: [facility] 215-01 Encounter: [id number]        Subjective:   Patient seen and examined at bedside after reviewing the chart and discussing the case with the caring staff.      Patient examined at bedside.  Patient reports no acute symptoms except he is being bothered by nocturia which is ongoing for the past several months.  Patient's UA on 6/17/2025 showed no evidence of UTI.    Physical Exam   Vitals: Blood pressure 130/60, pulse 70, temperature 98.4 °F (36.9 °C), temperature source Tympanic, resp. rate 18, height 6' (1.829 m), weight 65.7 kg (144 lb 13.5 oz), SpO2 97%.,Body mass index is 19.64 kg/m².  Constitutional: Patient in no acute distress.  HEENT: PERR, EOMI, MMM.  Cardiovascular: Normal rate and regular rhythm.    Pulmonary/Chest: Effort normal and breath sounds normal.   Abdomen: Soft, + BS, NT.    Assessment/Plan:  Gold Van is a(n) 79 y.o. 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 Galeas.      Cardiac with hx of HTN, HLD, PAF, CHF, cardiomyopathy, orthostatic hypotension.  Continue atenolol 25 mg daily, lisinopril 5 mg daily (decr from 5mg on 7/10), atorvastatin 40 mg daily, Eliquis 5 mg twice daily, increased midodrine 2.5 mg 3 times daily 7/18/2025 (started twice daily on 7/10).  Daily weights.  Apply abd binder and JOSEFA stockings.  T2DM.  Hgb A1c 6.6% on 6/18/25.  Home: Glucotrol XL 10 mg twice daily and metformin  mg daily.  Here: metformin  mg daily (restarted 7/6) and Glucotrol XL 5mg daily (restarted 7/15 however decr from BID to daily 7/17 due to hypoglycemia).  D/c Humalog 4u TID on 7/15, d/c Lantus 12u nightly on 7/16.  SSI with accuchecks ac.  Carb controlled diet.    Hx CVA.  10/2024.  Continue Plavix, Eliquis and statin.  PAD.  S/p R CFA/SFA endarterectomy/stenting on 5/13/25.  Continue Plavix and statin.  GERD.  Patient is on famotidine 20 mg daily.    COPD/asthma.   Continue home Breztri (brought from home), Singulair 10 mg nightly, albuterol inhaler as needed.  CKD stage 2.  Stable.  Cr 0.77 -> 0.76 -> 0.79 -> 0.82 on 7/17.  Peak Cr 1.56.  Avoid nephrotoxins.  Cont to monitor.  Chronic generalized pruritus.  Continue prednisone 5 mg daily.    Chronic hyponatremia.  Level 132 -> 132 -> 133 on 7/17.  Asymptomatic.  Cont to trend.    Anemia.  Stable.  Hgb 9.2 -> 9.5 -> 9.2 on 7/17.  Cont to trend.   Cough.  Ongoing x weeks.  CXR on 6/17 negative.  Start Mucinex 600 mg q12h 7/6.  Tessalon perles changed to TID 7/17.  Repeat CXR ordered 7/17.   MSSA bacteremia.  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  Now s/p right TMA.  Patient is on IV cefepime 2g q8h for 6 weeks (thru 8/6/25).  RUE PICC placed 6/30 and to be removed by RN after final dose of antibiotics.  Weekly CBCD and creatinine while on IV abx.  BPH.  I will put the patient on Proscar 5 mg daily 7/19/2025.  Pain and bowel regimen.  As per physiatry.  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  OR cultures grew serratia, pseudomonas, MSSA.  Patient is on IV cefepime 2g q8h for 6 weeks (thru 8/6/25).  Saw podiatry 7/8, sutures removed, full heel weight bearing with surgical shoe.  Saw podiatry 7/16 for surgical debridement.  He is receiving intensive PT OT with management as per physiatry.      Anticipated date of discharge.  Tuesday 7/22/2025

## 2025-07-19 NOTE — NURSING NOTE
[facility]  RN Shift Note        Vitals  Vital Signs  Temperature: 98.4 °F (36.9 °C)  Temp Source: Tympanic  Pulse: 70  Heart Rate Source: Monitor  Respirations: 18  Blood Pressure: 130/60  MAP (mmHg): 82  BP Location: Left arm  BP Method: Automatic  Patient Position - Orthostatic VS: Standing - Orthostatic VS        Follow up/Interventions- Blood Pressures with in normal   Pain Pain Score: 0  Hospital Pain Intervention(s): Medication (See MAR)    Follow up/Interventions- No complaints of pain   GI   (WNL/X)  LBM  Incontinence (yes/no)     Gastrointestinal (WDL): Within Defined Limits  Last BM Date: 07/19/25  Bowel Incontinence: No    Follow up/Interventions-       (WNL/X)  Incontinence (yes/no)  Bladder Scan  Urine Output  Shift Total Output  Unmeasured Occurrence   Genitourinary (WDL): Within Defined Limits  Urinary Incontinence: Yes    Urine: 250 mL    Unmeasured Urine Occurrence: 1      Follow up/Interventions-    Nutrition  Diet/Precautions   PO Intake  Meal Consumption   Nutrition  Feeding: Able to feed self  Diet Type: Diabetic  Appetite: Good  P.O.: 250 mL  Percent Meals Eaten (%): 100      Strategies/Interventions-   Follow up-    LDA  Drain Output             Follow up/Interventions-    Skin   (WNL/X)  Integrity  Pressure Injury YES/NO: yes     Integumentary (WDL): Exceptions to WDL  Skin Integrity: Bruising  Description dressing intact    Follow up/Interventions-    Behavior/Mood  Behavior log YES/NO: no Patient Behaviors/Mood: Calm, Cooperative    Follow up/Interventions-    Sleep Pattern  Sleep log no        Discharge Planning Education  (Medication/Device/Injections/Etc)        ADL/Mobility Summary  (transfer, bed mobility, ambulation)        Miscellaneous

## 2025-07-19 NOTE — PLAN OF CARE
Problem: PAIN - ADULT  Goal: Verbalizes/displays adequate comfort level or baseline comfort level  Description: Interventions:  - Encourage patient to monitor pain and request assistance  - Assess pain using appropriate pain scale  - Administer analgesics as ordered based on type and severity of pain and evaluate response  - Implement non-pharmacological measures as appropriate and evaluate response  - Consider cultural and social influences on pain and pain management  - Notify physician/advanced practitioner if interventions unsuccessful or patient reports new pain  - Educate patient/family on pain management process including their role and importance of  reporting pain   - Provide non-pharmacologic/complimentary pain relief interventions  Outcome: Progressing     Problem: INFECTION - ADULT  Goal: Absence or prevention of progression during hospitalization  Description: INTERVENTIONS:  - Assess and monitor for signs and symptoms of infection  - Monitor lab/diagnostic results    - [place] appropriate cooling/warming therapies per order  - Administer medications as ordered  - Instruct and encourage patient and family to use good hand hygiene technique  - Identify and instruct in appropriate isolation precautions for identified infection/condition  Outcome: Progressing     Problem: SAFETY ADULT  Goal: Patient will remain free of falls  Description: INTERVENTIONS:  - Educate patient/family on patient safety including physical limitations  - Instruct patient to call for assistance with activity   - OT/PT to assist with strengthening/mobility   - Keep Call bell within reach  - Keep bed low and locked with side rails adjusted as appropriate  - Keep care items and personal belongings within reach  - Initiate and maintain comfort rounds  - Make Fall Risk Sign visible to staff    - Apply yellow socks and bracelet for high fall risk patients  - Consider moving patient to room near nurses station  Outcome:  Progressing  Goal: Maintain or return to baseline ADL function  Description: INTERVENTIONS:  -  Assess patient's ability to carry out ADLs; assess patient's baseline for ADL function and identify physical deficits which impact ability to perform ADLs (bathing, care of mouth/teeth, toileting, grooming, dressing, etc.)  - Assess/evaluate cause of self-care deficits   - Assess range of motion  - Assess patient's mobility; develop plan if impaired  - Assess patient's need for assistive devices and provide as appropriate  - Encourage maximum independence but intervene and supervise when necessary  - Involve family in performance of ADLs  - Assess for home care needs following discharge   - OT to assist with ADL evaluation and planning for discharge  - Provide patient education as appropriate  - Monitor gait, balance and fatigue with ambulation    Outcome: Progressing  Goal: Maintains/Returns to pre admission functional level  Description: INTERVENTIONS:  - Set and communicate daily mobility goal to care team and patient/family/caregiver.   - Collaborate with rehabilitation services on mobility goals if consulted  - Out of bed for toileting  - Record patient progress and toleration of activity level   Outcome: Progressing     Problem: DISCHARGE PLANNING  Goal: Discharge to home or other facility with appropriate resources  Description: INTERVENTIONS:  - Identify barriers to discharge w/patient and caregiver  - Arrange for needed discharge resources and transportation as appropriate  - Identify discharge learning needs (meds, wound care, etc.)  - Arrange for interpretive services to assist at discharge as needed  - Refer to Case Management Department for coordinating discharge planning if the patient needs post-hospital services based on physician/advanced practitioner order or complex needs related to functional status, cognitive ability, or social support system  Outcome: Progressing     Problem: Prexisting or High  Potential for Compromised Skin Integrity  Goal: Skin integrity is maintained or improved  Description: INTERVENTIONS:  - Identify patients at risk for skin breakdown  - Assess and monitor skin integrity including under and around medical devices   - Assess and monitor nutrition and hydration status  - Monitor labs  - Assess for incontinence   - Turn and reposition patient  - Assist with mobility/ambulation  - Relieve pressure over joycelyn prominences   - Avoid friction and shearing  - Provide appropriate hygiene as needed including keeping skin clean and dry  - Evaluate need for skin moisturizer/barrier cream  - Collaborate with interdisciplinary team  - Patient/family teaching  - Consider wound care consult    Assess:  - Review Sae scale daily  - Clean and moisturize skin every day   - Inspect skin when repositioning, toileting, and assisting with ADLS  - Assess extremities for adequate circulation and sensation     Bed Management:  - Have minimal linens on bed & keep smooth, unwrinkled  - Change linens as needed when moist or perspiring  - Toileting:  - Offer bedside commode    Activity:  - Mobilize patient 3 times a day  - Encourage activity and walks on unit  - Encourage or provide ROM exercises   - Turn and reposition patient every 2 Hours  - Use appropriate equipment to lift or move patient in bed  - Instruct/ Assist with weight shifting every hour  when out of bed in chair  - Consider limitation of chair time 3 hour intervals    Skin Care:  - Avoid use of baby powder, tape, friction and shearing, hot water or constrictive clothing  - Do not massage red bony areas    Next Steps:    Outcome: Progressing     Problem: Nutrition/Hydration-ADULT  Goal: Nutrient/Hydration intake appropriate for improving, restoring or maintaining nutritional needs  Description: Monitor and assess patient's nutrition/hydration status for malnutrition. Collaborate with interdisciplinary team and initiate plan and interventions as  ordered.  Monitor patient's weight and dietary intake as ordered or per policy. Utilize nutrition screening tool and intervene as necessary. Determine patient's food preferences and provide high-protein, high-caloric foods as appropriate.     INTERVENTIONS:  - Monitor oral intake, urinary output, labs, and treatment plans  - Assess nutrition and hydration status and recommend course of action  - Evaluate amount of meals eaten  - Assist patient with eating if necessary   - Allow adequate time for meals  - Recommend/ encourage appropriate diets, oral nutritional supplements, and vitamin/mineral supplements  - Order, calculate, and assess calorie counts as needed  - Recommend, monitor, and adjust tube feedings and TPN/PPN based on assessed needs  - Assess need for intravenous fluids  - Provide specific nutrition/hydration education as appropriate  - Include patient/family/caregiver in decisions related to nutrition  Outcome: Progressing

## 2025-07-19 NOTE — CASE MANAGEMENT
Met with patient and wife, informed them that IV antibiotics have been ordered and no payment is required up front.  Plan for antibiotics to be delivered to the home on Monday.  [facility] will coordinate delivery with patient's wife, Stacia.   [facility] coordinated to have RN scheduled for July 22 at 3 PM for IV antibiotic treatment.  Will continue to follow for DC needs/concerns.

## 2025-07-19 NOTE — PROGRESS NOTES
07/19/25 0930   Pain Assessment   Pain Assessment Tool 0-10   Pain Score No Pain   Restrictions/Precautions   Precautions Bed/chair alarms;Fall Risk;Limb alert;Multiple lines   RLE Weight Bearing Per Order WBAT  (through heel with surgical shoe)   ROM Restrictions No   Braces or Orthoses   (surgical shoe R LE, JOSEFA L LE)   Cognition   Overall Cognitive Status WFL   Arousal/Participation Alert;Responsive;Cooperative   Attention Within functional limits   Orientation Level Oriented X4   Memory Decreased recall of precautions   Following Commands Follows one step commands without difficulty   Subjective   Subjective Pt reports he is feeling more and more confident with negotiating stairs, ready to go home on Tuesday   Roll Left and Right   Comment Pt OOB   Sit to Lying   Comment Pt OOB   Lying to Sitting on Side of Bed   Comment Pt OOB   Sit to Stand   Type of Assistance Needed Independent   Physical Assistance Level No physical assistance   Comment RW   Sit to Stand CARE Score 6   Bed-Chair Transfer   Type of Assistance Needed Independent   Physical Assistance Level No physical assistance   Comment RW   Chair/Bed-to-Chair Transfer CARE Score 6   Car Transfer   Reason if not Attempted Environmental limitations   Car Transfer CARE Score 10   Walk 10 Feet   Type of Assistance Needed Supervision   Physical Assistance Level No physical assistance   Comment RW   Walk 10 Feet CARE Score 4   Walk 50 Feet with Two Turns   Type of Assistance Needed Supervision   Physical Assistance Level No physical assistance   Comment RW   Walk 50 Feet with Two Turns CARE Score 4   Walk 150 Feet   Type of Assistance Needed Supervision   Physical Assistance Level No physical assistance   Comment RW   Walk 150 Feet CARE Score 4   Walking 10 Feet on Uneven Surfaces   Type of Assistance Needed Supervision   Physical Assistance Level No physical assistance   Comment Cl S RW, ramp   Walking 10 Feet on Uneven Surfaces CARE Score 4   Ambulation    Primary Mode of Locomotion Prior to Admission Walk   Distance Walked (feet) 200 ft  (200' x 2, 75', household distances)   Assist Device Roller Walker   Findings S RW; VC for upward gaze; one posterior LOB during ambulation requiring Virgie to correct when changing  on R UE   Does the patient walk? 2. Yes   Wheel 50 Feet with Two Turns   Reason if not Attempted Activity not applicable   Wheel 50 Feet with Two Turns CARE Score 9   Wheel 150 Feet   Reason if not Attempted Activity not applicable   Wheel 150 Feet CARE Score 9   Wheelchair mobility   Findings N/A   Does the patient use a wheelchair? 0. No   Curb or Single Stair   Style negotiated Single stair   Type of Assistance Needed Supervision   Physical Assistance Level No physical assistance   Comment distant S, 14  steps, B/L HR, non-reciprocal pattern   1 Step (Curb) CARE Score 4   4 Steps   Type of Assistance Needed Supervision   Physical Assistance Level No physical assistance   Comment distant S, 14  steps, B/L HR, non-reciprocal pattern   4 Steps CARE Score 4   12 Steps   Type of Assistance Needed Supervision   Physical Assistance Level No physical assistance   Comment distant S, 14  steps, B/L HR, non-reciprocal pattern   12 Steps CARE Score 4   Toilet Transfer   Comment did not require during session   Therapeutic Interventions   Strengthening repeated sit to stand transfers x 5, eccentric mini squats 2 x 15   Other transfer training, gait training, stair training, ramp negotiation   Assessment   Treatment Assessment Pt agreeable to PT this AM, received sitting upright in recliner. Pt is continuing to make good progress with transfers using RW, now mod I with good sequencing and safety. Pt continues to progress with stair negotiation, requiring distant S this session. Pt experienced one posterior LOB with ambulation this session, requiring Virgie to correct. Challenged LE strength/endurance with eccentric mini squats and repeated  sit to stands with good tolerance, though limited by fatigue. He remains limited by LE weakness, decreased upright/activity tolerance, and impaired righting reactions. Will continue with current PT POC to improve deficits and promote return to PLOF.   Problem List Decreased strength;Decreased range of motion;Decreased endurance;Impaired balance;Pain;Orthopedic restrictions;Decreased skin integrity;Decreased safety awareness   PT Barriers   Physical Impairment Decreased strength;Decreased endurance;Impaired balance;Decreased mobility;Decreased coordination;Orthopedic restrictions;Pain   Functional Limitation Car transfers;Ramp negotiation;Stair negotiation;Standing;Walking   Plan   Treatment/Interventions Functional transfer training;LE strengthening/ROM;Therapeutic exercise;Endurance training;Equipment eval/education;Patient/family training;Bed mobility;Gait training   Progress Progressing toward goals   PT Therapy Minutes   PT Time In 0930   PT Time Out 1030   PT Total Time (minutes) 60   PT Mode of treatment - Individual (minutes) 60   PT Mode of treatment - Concurrent (minutes) 0   PT Mode of treatment - Group (minutes) 0   PT Mode of treatment - Co-treat (minutes) 0   PT Mode of Treatment - Total time(minutes) 60 minutes   PT Cumulative Minutes 941     Patient remains OOB in chair, all needs in reach.  Alarm in place and activated.  Encouraged use of call bell, patient verbalizes understanding.

## 2025-07-20 NOTE — PLAN OF CARE
Problem: SAFETY ADULT  Goal: Patient will remain free of falls  Description: INTERVENTIONS:  - Educate patient/family on patient safety including physical limitations  - Instruct patient to call for assistance with activity   - OT/PT to assist with strengthening/mobility   - Keep Call bell within reach  - Keep bed low and locked with side rails adjusted as appropriate  - Keep care items and personal belongings within reach  - Initiate and maintain comfort rounds  - Make Fall Risk Sign visible to staff    - Apply yellow socks and bracelet for high fall risk patients  - Consider moving patient to room near nurses station  Outcome: Progressing

## 2025-07-20 NOTE — PROGRESS NOTES
07/20/25 0824   Pain Assessment   Pain Assessment Tool 0-10   Pain Score No Pain   Restrictions/Precautions   Precautions Bed/chair alarms;Fall Risk;Limb alert;Multiple lines   RLE Weight Bearing Per Order WBAT  (through heel with surgical shoe)   ROM Restrictions No   Braces or Orthoses   (surgical shoe R LE, JOSEFA L LE)   Lower Body Dressing   Type of Assistance Needed Physical assistance   Physical Assistance Level 26%-50%   Comment assist to don JOSEFA LLE and pt able to doff and xiomy Left shoe   Lower Body Dressing CARE Score 3   Sit to Stand   Type of Assistance Needed Independent   Sit to Stand CARE Score 6   Bed-Chair Transfer   Type of Assistance Needed Independent   Chair/Bed-to-Chair Transfer CARE Score 6   Exercise Tools   Other Exercise Tool 1 Sanderbox 2 sets 15 BUE therex all directions with 2#   Other Exercise Tool 2 Card match reaching with BUE to place and then remove sorting into suits   Other Exercise Tool 3 2# wt therex 2 sets 15 BUE including shoulder flexion/ extension, abdd/ add, elbow flexion/ extension and supination/ pronation   Cognition   Orientation Level Oriented X4   Other Comments   Assessment Pt particiaptes in 35 minutes concurrent treatment focusing on BUE therex/ theract with simialr goals as another patient to increase strength/ ROM and activity tolerance   Assessment   Treatment Assessment Pt presents seated in recliner finishing breakfast agreeable to OT Session including LB dressing, transfers/ mobility and BUE therex/ theract. Pt tolerates session without complaints and is progressing towards goals. Pt requries assist and supervision due to decreased balance, safety, endurance and strength/ ROM. pt will benefit from continued skilled OT Services to increase independence with daily tasks.   Problem List Decreased strength;Decreased range of motion;Decreased endurance;Impaired balance;Decreased safety awareness   Plan   Treatment/Interventions ADL retraining;Functional transfer  training;Therapeutic exercise;Endurance training;Patient/family training;Equipment eval/education;Compensatory technique education   Progress Progressing toward goals   OT Therapy Minutes   OT Time In 0824   OT Time Out 0925   OT Total Time (minutes) 61   OT Mode of treatment - Individual (minutes) 26   OT Mode of treatment - Concurrent (minutes) 35   Therapy Time missed   Time missed? No     Addendum: Fxl mobility to and from OT room with RW and S/ CGA with pt preferring to sit on a cushion and higher chair to elevate surface.

## 2025-07-20 NOTE — PLAN OF CARE
Problem: PAIN - ADULT  Goal: Verbalizes/displays adequate comfort level or baseline comfort level  Description: Interventions:  - Encourage patient to monitor pain and request assistance  - Assess pain using appropriate pain scale  - Administer analgesics as ordered based on type and severity of pain and evaluate response  - Implement non-pharmacological measures as appropriate and evaluate response  - Consider cultural and social influences on pain and pain management  - Notify physician/advanced practitioner if interventions unsuccessful or patient reports new pain  - Educate patient/family on pain management process including their role and importance of  reporting pain   - Provide non-pharmacologic/complimentary pain relief interventions  Outcome: Progressing     Problem: INFECTION - ADULT  Goal: Absence or prevention of progression during hospitalization  Description: INTERVENTIONS:  - Assess and monitor for signs and symptoms of infection  - Monitor lab/diagnostic results    - [place] appropriate cooling/warming therapies per order  - Administer medications as ordered  - Instruct and encourage patient and family to use good hand hygiene technique  - Identify and instruct in appropriate isolation precautions for identified infection/condition  Outcome: Progressing     Problem: Prexisting or High Potential for Compromised Skin Integrity  Goal: Skin integrity is maintained or improved  Description: INTERVENTIONS:  - Identify patients at risk for skin breakdown  - Assess and monitor skin integrity including under and around medical devices   - Assess and monitor nutrition and hydration status  - Monitor labs  - Assess for incontinence   - Turn and reposition patient  - Assist with mobility/ambulation  - Relieve pressure over joycelyn prominences   - Avoid friction and shearing  - Provide appropriate hygiene as needed including keeping skin clean and dry  - Evaluate need for skin moisturizer/barrier cream  -  Collaborate with interdisciplinary team  - Patient/family teaching  - Consider wound care consult    Assess:  - Review Sae scale daily  - Clean and moisturize skin every day   - Inspect skin when repositioning, toileting, and assisting with ADLS  - Assess extremities for adequate circulation and sensation     Bed Management:  - Have minimal linens on bed & keep smooth, unwrinkled  - Change linens as needed when moist or perspiring  - Toileting:  - Offer bedside commode    Activity:  - Mobilize patient 3 times a day  - Encourage activity and walks on unit  - Encourage or provide ROM exercises   - Turn and reposition patient every 2 Hours  - Use appropriate equipment to lift or move patient in bed  - Instruct/ Assist with weight shifting every hour  when out of bed in chair  - Consider limitation of chair time 3 hour intervals    Skin Care:  - Avoid use of baby powder, tape, friction and shearing, hot water or constrictive clothing  - Do not massage red bony areas    Next Steps:    Outcome: Progressing

## 2025-07-20 NOTE — PROGRESS NOTES
Progress Note - Gold Van 79 y.o. male MRN: [id number]    Unit/Bed#: [facility] 215-01 Encounter: [id number]        Subjective:   Patient seen and examined at bedside after reviewing the chart and discussing the case with the caring staff.      Patient examined at bedside.  Patient reports no acute symptoms.  It has been reported by the caring staff nurse that patient's PIC line does flush well but has no blood return.    Physical Exam   Vitals: Blood pressure 105/57, pulse 99, temperature 98.1 °F (36.7 °C), temperature source Temporal, resp. rate 16, height 6' (1.829 m), weight 65.6 kg (144 lb 10 oz), SpO2 96%.,Body mass index is 19.61 kg/m².  Constitutional: Patient in no acute distress.  HEENT: PERR, EOMI, MMM.  Cardiovascular: Normal rate and regular rhythm.    Pulmonary/Chest: Effort normal and breath sounds normal.   Abdomen: Soft, + BS, NT.    Assessment/Plan:  Gold Van is a(n) 79 y.o. 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 Galeas.      Cardiac with hx of HTN, HLD, PAF, CHF, cardiomyopathy, orthostatic hypotension.  Continue atenolol 25 mg daily, lisinopril 5 mg daily (decr from 5mg on 7/10), atorvastatin 40 mg daily, Eliquis 5 mg twice daily, increased midodrine 2.5 mg 3 times daily 7/18/2025 (started twice daily on 7/10).  Daily weights.  Apply abd binder and JOSEFA stockings.  T2DM.  Hgb A1c 6.6% on 6/18/25.  Home: Glucotrol XL 10 mg twice daily and metformin  mg daily.  Here: metformin  mg daily (restarted 7/6) and Glucotrol XL 5mg daily (restarted 7/15 however decr from BID to daily 7/17 due to hypoglycemia).  D/c Humalog 4u TID on 7/15, d/c Lantus 12u nightly on 7/16.  SSI with accuchecks ac.  Carb controlled diet.    Hx CVA.  10/2024.  Continue Plavix, Eliquis and statin.  PAD.  S/p R CFA/SFA endarterectomy/stenting on 5/13/25.  Continue Plavix and statin.  GERD.  Patient is on famotidine 20 mg daily.    COPD/asthma.  Continue home Breztri (brought from  home), Singulair 10 mg nightly, albuterol inhaler as needed.  CKD stage 2.  Stable.  Cr 0.77 -> 0.76 -> 0.79 -> 0.82 on 7/17.  Peak Cr 1.56.  Avoid nephrotoxins.  Cont to monitor.  Chronic generalized pruritus.  Continue prednisone 5 mg daily.    Chronic hyponatremia.  Level 132 -> 132 -> 133 on 7/17.  Asymptomatic.  Cont to trend.    Anemia.  Stable.  Hgb 9.2 -> 9.5 -> 9.2 on 7/17.  Cont to trend.   Cough.  Ongoing x weeks.  CXR on 6/17 negative.  Start Mucinex 600 mg q12h 7/6.  Tessalon perles changed to TID 7/17.  Repeat CXR ordered 7/17.   MSSA bacteremia.  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  Now s/p right TMA.  Patient is on IV cefepime 2g q8h for 6 weeks (thru 8/6/25).  RUE PICC placed 6/30 and to be removed by RN after final dose of antibiotics.  Weekly CBCD and creatinine while on IV abx.  BPH.  I will put the patient on Proscar 5 mg daily 7/19/2025.  Pain and bowel regimen.  As per physiatry.  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  OR cultures grew serratia, pseudomonas, MSSA.  Patient is on IV cefepime 2g q8h for 6 weeks (thru 8/6/25).  Saw podiatry 7/8, sutures removed, full heel weight bearing with surgical shoe.  Saw podiatry 7/16 for surgical debridement.  He is receiving intensive PT OT with management as per physiatry.      Anticipated date of discharge.  Tuesday 7/22/2025

## 2025-07-21 ENCOUNTER — TELEPHONE (OUTPATIENT)
Dept: INFECTIOUS DISEASES | Facility: CLINIC | Age: 80
End: 2025-07-21

## 2025-07-21 DIAGNOSIS — R78.81 MSSA BACTEREMIA: Primary | ICD-10-CM

## 2025-07-21 DIAGNOSIS — M86.9 OSTEOMYELITIS OF RIGHT FOOT, UNSPECIFIED TYPE (HCC): ICD-10-CM

## 2025-07-21 DIAGNOSIS — B95.61 MSSA BACTEREMIA: Primary | ICD-10-CM

## 2025-07-21 PROBLEM — G47.00 INSOMNIA: Status: ACTIVE | Noted: 2025-07-21

## 2025-07-21 NOTE — ASSESSMENT & PLAN NOTE
Malnutrition Findings:   Adult Malnutrition type: Chronic illness  Adult Degree of Malnutrition: Other severe protein calorie malnutrition  Malnutrition Characteristics: Inadequate energy, Weight loss                  360 Statement: Severe protein/calorie malnutrition r/t inadequate intake as evidenced by 12.8% unplanned wt loss since 4/8/25, Consuming < 75% energy intake compared to estimated energy needs > 1 month, nonhealing wounds. Treated with oral diet + ONS of Isaac bid and Ensure Plus HP 1 x day    BMI Findings:           Body mass index is 19.52 kg/m².   > Nutrition consulted   >encourage PO intake

## 2025-07-21 NOTE — DISCHARGE SUMMARY
Discharge Summary   Gold Van 79 y.o. male MRN: [id number]  Unit/Bed#: -01 Encounter: [id number]    Admission Date: 7/5/2025     Discharge Date:  7/22/2025    Etiologic/Rehabilitation Diagnosis: Impairment of mobility, safety and Activities of Daily Living (ADLs) due to diabetic ulcer of right midfoot s/p right TMA    HPI: Gold Van is a(n) 79 y.o. year old male who is admitted to [facility].  Patient originally presented to [facility] ED on 6/17/25 after being sent from wound care clinic for sepsis workup due to increased lethargy, hypotension, tachycardia and fever.  He has history of chronic osteomyelitis of right foot s/p right partial 5th ray resection 5/16/25 and s/p right partial 4th ray amputation and debridement of wound 5/23/25.  MRI right foot revealed postsurgical changes of partial fourth and fifth ray resections with a soft tissue ulcer overlying the cut surfaces of the fourth and fifth metatarsals and findings concerning for early osteomyelitis in the residual fourth metatarsal, the ulcer is also in close approximation to the distal third metatarsal and early osteomyelitis in that location cannot be excluded, no evidence of an abscess.  Patient started on IV Rocephin and Vancomyin.  Podiatry consulted.  Patient was taken to OR by Dr. Galeas on 6/23/25 for right foot transmetatarsal amputation.  Operative cultures were obtained which grew serratia, pseudomonas, MSSA.  Patient's admission blood cultures also showing MSSA.  Infectious disease consulted who recommended long-term antibiotics.  IV Rocephin and Vancomycin discontinued and patient started on IV cefepime through 8/6/25.  PICC line placed on 6/30/25.  Patient was seen in consultation by PT OT who recommended acute inpatient rehab services.   During the course of patient's stay at inpatient rehabilitation unit patient received treatment for the following medical conditions.     Cardiac with hx of HTN,  HLD, PAF, CHF, cardiomyopathy, orthostatic hypotension.  Continue atenolol 25 mg daily, lisinopril 5 mg daily (decr from 5mg on 7/10), atorvastatin 40 mg daily, Eliquis 5 mg twice daily, increased midodrine 2.5 mg 3 times daily 7/18/2025 (started twice daily on 7/10).  Daily weights.  Apply abd binder and JOSEFA stockings.  T2DM.  Hgb A1c 6.6% on 6/18/25.  Home: Glucotrol XL 10 mg twice daily and metformin  mg daily.  Here: metformin  mg daily (restarted 7/6) and Glucotrol XL 5mg daily (restarted 7/15 however decr from BID to daily 7/17 due to hypoglycemia).  D/c Humalog 4u TID on 7/15, d/c Lantus 12u nightly on 7/16.  SSI with accuchecks ac.  Carb controlled diet.    Hx CVA.  10/2024.  Continue Plavix, Eliquis and statin.  PAD.  S/p R CFA/SFA endarterectomy/stenting on 5/13/25.  Continue Plavix and statin.  GERD.  Patient is on famotidine 20 mg daily.    COPD/asthma.  Continue home Breztri (brought from home), Singulair 10 mg nightly, albuterol inhaler as needed.  CKD stage 2.  Stable.  Cr 0.77 -> 0.76 -> 0.79 -> 0.82 -> 0.70 on 7/21/2025.  Peak Cr 1.56.  Avoid nephrotoxins.  Cont to monitor.  Chronic generalized pruritus.  Continue prednisone 5 mg daily.    Chronic hyponatremia.  Level 132 -> 132 -> 133 on 7/17.  Asymptomatic.  Cont to trend.    Anemia.  Stable.  Hgb 9.2 -> 9.5 -> 9.2 -> 9.2 on 7/21/2025.  Cont to trend.   Cough.  Ongoing x weeks.  CXR on 6/17 negative.  Start Mucinex 600 mg q12h 7/6.  Tessalon perles changed to TID 7/17.  Repeat CXR ordered 7/17.   MSSA bacteremia.  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  Now s/p right TMA.  Patient is on IV cefepime 2g q8h for 6 weeks (thru 8/6/25).  RUE PICC placed 6/30 and to be removed by RN after final dose of antibiotics.  Weekly CBCD and creatinine while on IV abx.  BPH.  I will put the patient on Proscar 5 mg daily 7/19/2025.  Pain and bowel regimen.  As per physiatry.  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  OR cultures grew  serratia, pseudomonas, MSSA.  Patient is on IV cefepime 2g q8h for 6 weeks (thru 8/6/25).  Saw podiatry 7/8, sutures removed, full heel weight bearing with surgical shoe.  Saw podiatry 7/16 for surgical debridement.  He is receiving intensive PT OT with management as per physiatry.     No new Assessment & Plan notes have been filed under this hospital service since the last note was generated.  Service: Geriatric Medicine    Physiatry Additions/Comorbidities:    Acute Rehabilitation Center Course: Patient participated in a comprehensive interdisciplinary inpatient rehabilitation program which included involvment of Rehab MD, therapies (PT, OT, and/or SLP), RN, CM, SW, dietary, and psychology services.     Significant Findings, Care, Treatment and Services Provided: Acute comprehensive interdisciplinary inpatient rehabilitation including PT, OT, SLP, RN, CM, SW, dietary, psychology, etc.  S/P transmetatarsal amputation of foot, right (HCC)  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)  2/2 nonhealing wound, presented in septic shock.  S/p TMA on 6/23 by Dr. Galeas  Unclear if source control  OR Cultures: Serratia, MSSA, Pseudomonas  Blood cultures 6/26 with NGTD  > Per ID continue Cefepime through 8/6 for 6 weeks treatment  > PICC placed on 6/30  > Monitor labs at least weekly for toxicities  > Pain management  > Close incisional monitoring and care.  > NWB RLE  > PT/OT 3-5 hours/day, 5-7 days/week  > f/u Podiatry ~ 7/22 9:00 a              Patient f/u w/ Dr. Galeas in 7/8: sutures removed; will need weekly debridement; full heel weight bearing and avoid heel - to toe gait in surgical shoe               7/15: improved healing at dehiscence area; surgical debridement performed  > outpatient f/u with ID   - 7/22 Home PT/OT/RN; family training for IV management has been completed and will continue over the weekend for repetition  Foot drop, left  L ankle weakness in both PF/DF/EHL  Unclear if related to  previous CVA in 2024 (CVA in setting of COPD exacerbation, but presented like impaired coordination)  Also with peripheral neuropathy  Denies back/radicular pain.  Was present in last [facility] admission in 2024.   Previously attempted to get AFO, but he declined it due to copay.   > Outpatient f/u with podiatry  CVA (cerebrovascular accident) (HCC)  10/2024  Presented with: loss of coordination in the setting of a COPD exacerbation. Imaging with significant and severe intracranial stenosis and atherosclerotic diseases as well as multiple foci of acute infarcts involving bilateral frontoparietal cortices.   Felt to be 2/2 hypoperfusion with question of hypercoag related to COVID>  Was on DAPT for 90 days, followed by ASA monotherapy  Also found to have Afib.   > Continue Plavix, Statin, Eliquis   Neuropathy  Likely 2/2 T2DM   Has some stocking dependent sensation impairment  May impact balance  No associated pain  >Close monitoring of skin/incision.   Pressure injury of skin of sacral region  POA   >Frequent off loading  Turn patient Q2hr in bed  Specialty cushion when OOB   Local care as per wound care   Consulted nutrition/wound care for continuity  Outpatient f/u with wound care clinic   Pressure injury of right heel, stage 1  POA to [facility]  > Elevate heels of bed  > Allevyn daily and monitoring Qshift  > Outpatient f/u with Podiatry   Acute pain  Minimal  Mostly in sacrum  > Tylenol PRN  > Oxycodone 2.5-5mg Q4hr PRN  > Monitor and wean as tolerated   Multiple open wounds of lower leg  Dermatitis associated with moisture  POA to [facility]  > Continue local wound care to pretibial wounds  Skin Care orders 7/15:  1-Hydraguard to Left heels 2 times per day and as needed    2-EHOB/WAFFLE or Roho  cushion when out of bed in chair.  3-Moisturize skin daily with skin nourishing cream  4-Elevate heels to offload pressure. EHOB off loading boots when in bed   5-Turn/reposition every 2 hours to offload and prevent pressure   6. Apply  silicone foam to the right heel ramone with a P an ddate peel and check skin integrity every shift change every 3 days   7. Cleanse sacrum, buttocks with soap and water apply triad paste to the wound on the right buttocks only then a silicone foam ramone with a T and date check skin integrity every shift and change every other day or when soiled   8. P 500 low air loss mattress   9. Dr. 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      Severe protein-calorie malnutrition (HCC)  Malnutrition Findings:   Adult Malnutrition type: Chronic illness  Adult Degree of Malnutrition: Other severe protein calorie malnutrition  Malnutrition Characteristics: Inadequate energy, Weight loss        360 Statement: Severe protein/calorie malnutrition r/t inadequate intake as evidenced by 12.8% unplanned wt loss since 4/8/25, Consuming < 75% energy intake compared to estimated energy needs > 1 month, nonhealing wounds. Treated with oral diet + ONS of Isaac bid and Ensure Plus HP 1 x day     BMI Findings:     Body mass index is 19.52 kg/m².   > Nutrition consulted   >encourage PO intake  At risk for venous thromboembolism (VTE)  Fully anticoagulated on eliquis, SCDs, Ambulation   Chronic heart failure with preserved ejection fraction (HFpEF) ([facility])  6/26 Echo with EF 50-55%, Diastolic function WNL  Follows with Dr. Nelson  Home: patient declined diuretic regimen, is on Lisinopril 5mg daily  > Here: Lisinopril 5mg daily  > Monitor volume status, lytes  > I/O, daily weights.  > Management as per IM  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[facility])  Home: Breztri BID, Albuterol PRN, Singulair nightly  Here: Budesonide-Glycopyrrol-Formoterol BID, Singulair QHS, Albuterol PRN; scheduled tessalon pearls    No acute exacerbation    Management as per IM  F/u [facility]  CXR 7/17: negative for acute cardiopulm disease   Essential hypertension  Home: Atenolol 25mg daily, Lisinopril 5mg daily  > Here: Atenolol 25mg daily, Lisinopril 5mg daily  Gastroesophageal reflux  disease without esophagitis  Home: Famotidine 20mg BID  Here: Famotidine daily  Hyponatremia  Chronically 130-132  Here: 135  Not receiving any treatment currently  Monitor with routine blood work   PAF (paroxysmal atrial fibrillation) ([facility])  Home: Eliquis mg BID, no rate/rhythm control  Here: Same.  Monitor and adjust as appropriate  Severe peripheral arterial disease ([facility])  S/p R CFA/SFA endarterectomy w/ bovine pericardial patch, DCB and stenting of SFA, and angio of AT 25 ([facility])   > Plavix, Statin, Eliquis  > Monitor neurovascular exam at least daily  > f/u Vascular surgery   Type 2 diabetes mellitus with complication, without long-term current use of insulin ([facility])  Home: Glipizide 10mg BID, Linagliptin 5mg daily (listed as not taking), Metformin 500mg at dinner   Here: Lantus 12 units QHS, Lispro 4 units TID with meals, CDI/Accuchecks.  Will need to transition to or optimize home meds, or else patient will need to diabetes kit/education.  Management as per IM.      Pruritic condition  At home on prednisone 5mg daily  Continued here, but at risk for poor blood sugar control and infection  Monitor for now.   Orthostatic hypotension  Significant orthostatic hypotension w/ dizziness  TEDS and binder when OOB - discontinued   Midodrine 2.5 TID  Encourage PO inake   Functional Status Upon Admission to [facility]:Self Care:  Eatin: Setup or clean-up assistance  Groomin: Not applicable  Shower/bathing self: 09: Not applicable  Upper body dressin: Not applicable  Lower body dressin: Partial/moderate assistance  Putting on/taking off footwear: 03: Partial/moderate assistance  Toilet hygiene: 02: Substantial/maximal assistance   Transfers:  Roll left and right: 09: Not applicable  Sit to lyin: Not applicable  Lying to sitting on side of bed: 04: Supervision or touching  assistance  Sit to stand: 03: Partial/moderate assistance (mod x 2)  Chair/bed to chair transfer: 03: Partial/moderate  assistance (mod x 2)  Toilet transfer: 03: Partial/moderate assistance (mod x 2)  Mobility:  Walk 10 ft: 88: Not attempted due to medical conditions or safety concerns  Walk 50 ft with two turns: 88: Not attempted due to medical conditions or safety concerns  Walk 150ft: 88: Not attempted due to medical conditions or safety concerns    Functional Status Upon Discharge from [facility]: Transfers: ashli with RW Mobility: mod I ADLs: Dressing: physical assistance Shower/bathe: independent    Discharge Diagnosis: Impairment of mobility, safety and Activities of Daily Living (ADLs) due to Other Disabling Impairments:  13  Other Disabling Impairments diabetic ulcer of right midfoot s/p right TMA    Discharge Medications:   See after visit summary for reconciled discharge medications provided to patient and family.      Condition at Discharge: fair     Discharge instructions/Information to patient and family:   See after visit summary for information provided to patient and family.      Provisions for Follow-Up Care:  See after visit summary for information related to follow-up care and any pertinent home health orders.      Future Appointments   Date Time Provider Department Center   7/22/2025  9:00 AM Agustin Galeas DPM [facility]   7/29/2025  9:30 AM Agustin Galeas DPM [facility]   7/29/2025 12:15 PM Sumanth Ricardo PA-C [facility] Hospitalist   8/26/2025  2:30 PM Estefany Pandey MD [facility]   9/3/2025  2:40 PM BELGICA Geronimo  [facility]   10/24/2025 11:20 AM Richard Gibbs DO  [facility]       Disposition: Home    Planned Readmission: No    Discharge Statement   I spent 45 minutes discharging the patient. This time was spent on the day of discharge. I had direct contact with the patient on the day of discharge. Greater than 50% of the total time was spent examining patient, answering all patient questions, arranging and discussing plan of  care with patient as well as directly providing post-discharge instructions. Additional time then spent on discharge activities.    Discharge Medications:  See after visit summary for reconciled discharge medications provided to patient and family.      Facility Administered Medications Prior to Discharge:    Current Facility-Administered Medications   Medication Dose Route Frequency Provider Last Rate    acetaminophen  650 mg Oral Q6H PRN Annie L Dagnall, PA-C      albuterol  2 puff Inhalation Q4H PRN Annie L Dagnall, PA-C      apixaban  5 mg Oral BID Annie L Dagnall, PA-C      atenolol  25 mg Oral Daily Annie L Dagnall, PA-C      atorvastatin  40 mg Oral QPM Annie L Dagnall, PA-C      benzonatate  200 mg Oral TID Julianna Snyder MD      Budeson-Glycopyrrol-Formoterol  2 puff Inhalation Q12H [facility] Ilana Payne PA-C      cefepime  2,000 mg Intravenous Q8H BONILLA Mckay-C 2,000 mg (07/21/25 0545)    clopidogrel  75 mg Oral Daily Annie L Dagnall, PA-C      famotidine  20 mg Oral Daily Annie L Dagnall, PA-C      finasteride  5 mg Oral Daily Christian Hendrickson MD      glipiZIDE  5 mg Oral Daily With Breakfast Annie Martines PA-C      guaiFENesin  1,200 mg Oral Q12H [facility] Julianna Snyder MD      insulin lispro  1-5 Units Subcutaneous TID With Meals BONILLA Salgado-MICHAEL      lisinopril  2.5 mg Oral Daily Annie Martines, PA-C      melatonin  3 mg Oral HS Julianna Snyder MD      metFORMIN  500 mg Oral Daily With Dinner Annie Martines, PA-MICHAEL      midodrine  2.5 mg Oral TID AC Christian Hendrickson MD      montelukast  10 mg Oral HS Annie STEPHANIE Martines, PA-C      multivitamin-minerals  1 tablet Oral Daily Annie L Dagnall, PA-C      ondansetron  4 mg Oral Q6H PRN Annie L Dagnall, PA-C      oxyCODONE  2.5 mg Oral Q4H PRN Annie L Dagnall, PA-C      Or    oxyCODONE  5 mg Oral Q4H PRN Annie L Dagnall, PA-C      polyethylene glycol  17 g Oral Daily PRN Annie L Dagnall, PA-C      predniSONE  10 mg  Oral Every Other Day Bihn Hurd, DO      predniSONE  5 mg Oral Every Other Day Binh Hurd, DO      sodium chloride  2 spray Each Nare Q1H PRN Annie Martines PA-C

## 2025-07-21 NOTE — PROGRESS NOTES
07/21/25 1000   Pain Assessment   Pain Assessment Tool 0-10   Pain Score No Pain   Restrictions/Precautions   Precautions Bed/chair alarms;Fall Risk;Limb alert;Multiple lines   RLE Weight Bearing Per Order WBAT  (through heel with surgical shoe)   ROM Restrictions No   Braces or Orthoses Other (Comment)  (surgical shoe R LE, L LE JOSEFA)   Cognition   Overall Cognitive Status WFL   Arousal/Participation Alert;Responsive;Cooperative   Attention Within functional limits   Orientation Level Oriented X4   Memory Decreased recall of precautions   Following Commands Follows one step commands without difficulty   Subjective   Subjective Pt reports he is ready to go home   Roll Left and Right   Type of Assistance Needed Independent   Physical Assistance Level No physical assistance   Roll Left and Right CARE Score 6   Sit to Lying   Type of Assistance Needed Independent   Physical Assistance Level No physical assistance   Sit to Lying CARE Score 6   Lying to Sitting on Side of Bed   Type of Assistance Needed Independent   Physical Assistance Level No physical assistance   Lying to Sitting on Side of Bed CARE Score 6   Sit to Stand   Type of Assistance Needed Independent   Physical Assistance Level No physical assistance   Comment RW   Sit to Stand CARE Score 6   Bed-Chair Transfer   Type of Assistance Needed Independent   Physical Assistance Level No physical assistance   Comment RW   Chair/Bed-to-Chair Transfer CARE Score 6   Car Transfer   Reason if not Attempted Environmental limitations   Car Transfer CARE Score 10   Walk 10 Feet   Type of Assistance Needed Supervision   Physical Assistance Level No physical assistance   Walk 10 Feet CARE Score 4   Walk 50 Feet with Two Turns   Type of Assistance Needed Supervision   Physical Assistance Level No physical assistance   Walk 50 Feet with Two Turns CARE Score 4   Walk 150 Feet   Type of Assistance Needed Supervision   Physical Assistance Level No physical assistance   Walk  150 Feet CARE Score 4   Walking 10 Feet on Uneven Surfaces   Type of Assistance Needed Supervision   Physical Assistance Level No physical assistance   Comment RW, ramp   Walking 10 Feet on Uneven Surfaces CARE Score 4   Ambulation   Primary Mode of Locomotion Prior to Admission Walk   Distance Walked (feet) 540 ft  (540', 180', 150', household distances)   Assist Device Roller Walker   Findings distant S RW, VC for upward gaze   Does the patient walk? 2. Yes   Wheel 50 Feet with Two Turns   Reason if not Attempted Activity not applicable   Wheel 50 Feet with Two Turns CARE Score 9   Wheel 150 Feet   Reason if not Attempted Activity not applicable   Wheel 150 Feet CARE Score 9   Wheelchair mobility   Findings N/A   Does the patient use a wheelchair? 0. No   Curb or Single Stair   Style negotiated Single stair   Type of Assistance Needed Supervision   Physical Assistance Level No physical assistance   Comment distanst S, B/L HR, reciprocal ascending, non-reciprocal descending   1 Step (Curb) CARE Score 4   4 Steps   Type of Assistance Needed Supervision   Physical Assistance Level No physical assistance   Comment distanst S, B/L HR, reciprocal ascending, non-reciprocal descending   4 Steps CARE Score 4   12 Steps   Type of Assistance Needed Supervision   Physical Assistance Level No physical assistance   Comment distanst S, B/L HR, reciprocal ascending, non-reciprocal descending   12 Steps CARE Score 4   Toilet Transfer   Comment did not require during session   Therapeutic Interventions   Strengthening seated LE TE   Other TU.34s, 5TSTS: 39.08s, gait training, stair training, ramp negotiation   Assessment   Treatment Assessment Pt agreeable to PT this AM, received sitting upright in recliner. Pt continuing to demonstrate improvements in endurance, with increased ambulatory distances this session. Pt requires increased time for 5TSTS due to LE weakness and fatigue. Pt is mod I for transfers with RW, but requires  S for gait activities due to WBS with surgical shoe and orthostasis concerns. Pt has met all PT goals and IS safe for d/c home with RW for functional mobility. He will require S for gait and stair negotiation at home. Educated on HEP, energy conervation techniques, frequent/regular BP checks, and safety with mobility, verbalized understanding. He will benefit from continued PT services post d/c to continue to improve deficits and promote return to PLOF.   Problem List Decreased strength;Decreased range of motion;Decreased endurance;Impaired balance;Decreased safety awareness   PT Barriers   Physical Impairment Decreased strength;Decreased endurance;Impaired balance;Decreased mobility;Decreased coordination;Orthopedic restrictions;Pain   Functional Limitation Car transfers;Ramp negotiation;Stair negotiation;Standing;Walking   Plan   Treatment/Interventions Functional transfer training;LE strengthening/ROM;Therapeutic exercise;Endurance training;Patient/family training;Equipment eval/education;Bed mobility;Gait training   Progress Progressing toward goals   PT Therapy Minutes   PT Time In 1000   PT Time Out 1130   PT Total Time (minutes) 90   PT Mode of treatment - Individual (minutes) 60   PT Mode of treatment - Concurrent (minutes) 30   PT Mode of treatment - Group (minutes) 0   PT Mode of treatment - Co-treat (minutes) 0   PT Mode of Treatment - Total time(minutes) 90 minutes   PT Cumulative Minutes 1031     Seated LE TE:  3x10, B/L LEs, 2#  March  LAQ  Hip ABd - green T-band  Hip ADd - ball Nate  GS  HR  TR  HS Curls - green T-band    Patient remains OOB in chair, all needs in reach.  Alarm in place and activated.  Encouraged use of call bell, patient verbalizes understanding.

## 2025-07-21 NOTE — NURSING NOTE
[facility]  RN Shift Note        Vitals  Vital Signs  Temperature: 97.7 °F (36.5 °C)  Temp Source: Temporal  Pulse: 88  Heart Rate Source: Monitor  Respirations: 16  Blood Pressure: 136/60  MAP (mmHg): 72  BP Location: Left arm  BP Method: Automatic  Patient Position - Orthostatic VS: Sitting           Pain Pain Score: 0  Hospital Pain Intervention(s): Medication (See MAR)    Reported no pain   GI   (WNL/X)  LBM  Incontinence (yes/no)     Gastrointestinal (WDL): Exceptions to WDL  Last BM Date: 07/19/25  Bowel Incontinence: No          (WNL/X)  Incontinence (yes/no)  Bladder Scan  Urine Output  Shift Total Output  Unmeasured Occurrence   Genitourinary (WDL): Within Defined Limits  Urinary Incontinence: No    Urine: 400 mL    Unmeasured Urine Occurrence: 1      Continent during shift    Nutrition  Diet/Precautions   PO Intake  Meal Consumption   Nutrition  Feeding: Able to feed self  Diet Type: Diabetic  Appetite: Fair  P.O.: 250 mL  Percent Meals Eaten (%): 50         LDA  Drain Output                Skin   (WNL/X)  Integrity  Pressure Injury      Integumentary (WDL): Exceptions to WDL  Skin Integrity: Redness, Bruising  Description     Wound care changed daily   Behavior/Mood  Behavior log Patient Behaviors/Mood: Calm, Cooperative       Sleep Pattern  Sleep log        Discharge Planning Education  (Medication/Device/Injections/Etc)        ADL/Mobility Summary  (transfer, bed mobility, ambulation)   Supervision with roller walker      Miscellaneous

## 2025-07-21 NOTE — PROGRESS NOTES
Progress Note - Gold Van 79 y.o. male MRN: [id number]    Unit/Bed#: [facility] 215-01 Encounter: [id number]        Subjective:   Patient seen and examined at bedside after reviewing the chart and discussing the case with the caring staff.      Patient examined at bedside.  Patient reports no acute symptoms.  Continues to exhibit orthostatic hypotension without any symptoms.    I reviewed the labs from 7/21/2025.  Patient's CMP is within normal limits.  Patient's CBC showed hemoglobin 9.2 with hematocrit 30.1.    Physical Exam   Vitals: Blood pressure (!) 87/58, pulse 81, temperature 98.3 °F (36.8 °C), temperature source Temporal, resp. rate 16, height 6' (1.829 m), weight 65.3 kg (143 lb 15.4 oz), SpO2 97%.,Body mass index is 19.52 kg/m².  Constitutional: Patient in no acute distress.  HEENT: PERR, EOMI, MMM.  Cardiovascular: Normal rate and regular rhythm.    Pulmonary/Chest: Effort normal and breath sounds normal.   Abdomen: Soft, + BS, NT.    Assessment/Plan:  Gold Van is a(n) 79 y.o. 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 Galeas.      Cardiac with hx of HTN, HLD, PAF, CHF, cardiomyopathy, orthostatic hypotension.  Continue atenolol 25 mg daily, lisinopril 5 mg daily (decr from 5mg on 7/10), atorvastatin 40 mg daily, Eliquis 5 mg twice daily, increased midodrine 2.5 mg 3 times daily 7/18/2025 (started twice daily on 7/10).  Daily weights.  Apply abd binder and JOSEFA stockings.  T2DM.  Hgb A1c 6.6% on 6/18/25.  Home: Glucotrol XL 10 mg twice daily and metformin  mg daily.  Here: metformin  mg daily (restarted 7/6) and Glucotrol XL 5mg daily (restarted 7/15 however decr from BID to daily 7/17 due to hypoglycemia).  D/c Humalog 4u TID on 7/15, d/c Lantus 12u nightly on 7/16.  SSI with accuchecks ac.  Carb controlled diet.    Hx CVA.  10/2024.  Continue Plavix, Eliquis and statin.  PAD.  S/p R CFA/SFA endarterectomy/stenting on 5/13/25.  Continue Plavix and  statin.  GERD.  Patient is on famotidine 20 mg daily.    COPD/asthma.  Continue home Breztri (brought from home), Singulair 10 mg nightly, albuterol inhaler as needed.  CKD stage 2.  Stable.  Cr 0.77 -> 0.76 -> 0.79 -> 0.82 -> 0.70 on 7/21/2025.  Peak Cr 1.56.  Avoid nephrotoxins.  Cont to monitor.  Chronic generalized pruritus.  Continue prednisone 5 mg daily.    Chronic hyponatremia.  Level 132 -> 132 -> 133 on 7/17.  Asymptomatic.  Cont to trend.    Anemia.  Stable.  Hgb 9.2 -> 9.5 -> 9.2 -> 9.2 on 7/21/2025.  Cont to trend.   Cough.  Ongoing x weeks.  CXR on 6/17 negative.  Start Mucinex 600 mg q12h 7/6.  Tessalon perles changed to TID 7/17.  Repeat CXR ordered 7/17.   MSSA bacteremia.  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  Now s/p right TMA.  Patient is on IV cefepime 2g q8h for 6 weeks (thru 8/6/25).  RUE PICC placed 6/30 and to be removed by RN after final dose of antibiotics.  Weekly CBCD and creatinine while on IV abx.  BPH.  I will put the patient on Proscar 5 mg daily 7/19/2025.  Pain and bowel regimen.  As per physiatry.  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  OR cultures grew serratia, pseudomonas, MSSA.  Patient is on IV cefepime 2g q8h for 6 weeks (thru 8/6/25).  Saw podiatry 7/8, sutures removed, full heel weight bearing with surgical shoe.  Saw podiatry 7/16 for surgical debridement.  He is receiving intensive PT OT with management as per physiatry.      Anticipated date of discharge.  Tuesday 7/22/2025

## 2025-07-21 NOTE — PLAN OF CARE
Problem: PAIN - ADULT  Goal: Verbalizes/displays adequate comfort level or baseline comfort level  Description: Interventions:  - Encourage patient to monitor pain and request assistance  - Assess pain using appropriate pain scale  - Administer analgesics as ordered based on type and severity of pain and evaluate response  - Implement non-pharmacological measures as appropriate and evaluate response  - Consider cultural and social influences on pain and pain management  - Notify physician/advanced practitioner if interventions unsuccessful or patient reports new pain  - Educate patient/family on pain management process including their role and importance of  reporting pain   - Provide non-pharmacologic/complimentary pain relief interventions  Outcome: Progressing     Problem: INFECTION - ADULT  Goal: Absence or prevention of progression during hospitalization  Description: INTERVENTIONS:  - Assess and monitor for signs and symptoms of infection  - Monitor lab/diagnostic results    - [place] appropriate cooling/warming therapies per order  - Administer medications as ordered  - Instruct and encourage patient and family to use good hand hygiene technique  - Identify and instruct in appropriate isolation precautions for identified infection/condition  Outcome: Progressing     Problem: SAFETY ADULT  Goal: Patient will remain free of falls  Description: INTERVENTIONS:  - Educate patient/family on patient safety including physical limitations  - Instruct patient to call for assistance with activity   - OT/PT to assist with strengthening/mobility   - Keep Call bell within reach  - Keep bed low and locked with side rails adjusted as appropriate  - Keep care items and personal belongings within reach  - Initiate and maintain comfort rounds  - Make Fall Risk Sign visible to staff    - Apply yellow socks and bracelet for high fall risk patients  - Consider moving patient to room near nurses station  Outcome: Progressing      Problem: Prexisting or High Potential for Compromised Skin Integrity  Goal: Skin integrity is maintained or improved  Description: INTERVENTIONS:  - Identify patients at risk for skin breakdown  - Assess and monitor skin integrity including under and around medical devices   - Assess and monitor nutrition and hydration status  - Monitor labs  - Assess for incontinence   - Turn and reposition patient  - Assist with mobility/ambulation  - Relieve pressure over joycelyn prominences   - Avoid friction and shearing  - Provide appropriate hygiene as needed including keeping skin clean and dry  - Evaluate need for skin moisturizer/barrier cream  - Collaborate with interdisciplinary team  - Patient/family teaching  - Consider wound care consult    Assess:  - Review Sae scale daily  - Clean and moisturize skin every day   - Inspect skin when repositioning, toileting, and assisting with ADLS  - Assess extremities for adequate circulation and sensation     Bed Management:  - Have minimal linens on bed & keep smooth, unwrinkled  - Change linens as needed when moist or perspiring  - Toileting:  - Offer bedside commode    Activity:  - Mobilize patient 3 times a day  - Encourage activity and walks on unit  - Encourage or provide ROM exercises   - Turn and reposition patient every 2 Hours  - Use appropriate equipment to lift or move patient in bed  - Instruct/ Assist with weight shifting every hour  when out of bed in chair  - Consider limitation of chair time 3 hour intervals    Skin Care:  - Avoid use of baby powder, tape, friction and shearing, hot water or constrictive clothing  - Do not massage red bony areas    Next Steps:    Outcome: Progressing     Problem: Nutrition/Hydration-ADULT  Goal: Nutrient/Hydration intake appropriate for improving, restoring or maintaining nutritional needs  Description: Monitor and assess patient's nutrition/hydration status for malnutrition. Collaborate with interdisciplinary team and initiate  plan and interventions as ordered.  Monitor patient's weight and dietary intake as ordered or per policy. Utilize nutrition screening tool and intervene as necessary. Determine patient's food preferences and provide high-protein, high-caloric foods as appropriate.     INTERVENTIONS:  - Monitor oral intake, urinary output, labs, and treatment plans  - Assess nutrition and hydration status and recommend course of action  - Evaluate amount of meals eaten  - Assist patient with eating if necessary   - Allow adequate time for meals  - Recommend/ encourage appropriate diets, oral nutritional supplements, and vitamin/mineral supplements  - Order, calculate, and assess calorie counts as needed  - Recommend, monitor, and adjust tube feedings and TPN/PPN based on assessed needs  - Assess need for intravenous fluids  - Provide specific nutrition/hydration education as appropriate  - Include patient/family/caregiver in decisions related to nutrition  Outcome: Progressing

## 2025-07-21 NOTE — TELEPHONE ENCOUNTER
*Orders faxed to [facility], 7/21.    Contacted patient's spouse. She confirms appointment date/time discussed 7/14. No further questions at this time.

## 2025-07-21 NOTE — ASSESSMENT & PLAN NOTE
S/p R CFA/SFA endarterectomy w/ bovine pericardial patch, DCB and stenting of SFA, and angio of AT 5/13/25 ([facility])   > Plavix, Statin, Eliquis  > Monitor neurovascular exam at least daily  > f/u Vascular surgery

## 2025-07-21 NOTE — ASSESSMENT & PLAN NOTE
Chronically 130-132  Here: 135  Not receiving any treatment currently  Monitor with routine blood work

## 2025-07-21 NOTE — PROGRESS NOTES
OPAT NOTE    AP ONLY CAMPUSES ARE: [place] and [place].   In these cases, physician is only cosigning notes.    Supervising/Discharge provider: Soraya Gordon  Cosigning Physician: Michael Castaneda    Diagnosis: MSSA Bacteremia, R Foot Osteomyelitis    Drug: Cefepime    Dose/Route/Frequency: 2g IV Q8H    Labs/Frequency: CBCD Cr weekly    End Date: 8/6    Infusion/VNA/SNF contact: KEANU/Abrahamtar    Next appointment: 7/29, 12:15pm

## 2025-07-21 NOTE — ASSESSMENT & PLAN NOTE
Wt Readings from Last 3 Encounters:   07/21/25 65.3 kg (143 lb 15.4 oz)   06/26/25 62.1 kg (137 lb)   06/10/25 67.1 kg (148 lb)   6/26 Echo with EF 50-55%, Diastolic function WNL  Follows with Dr. Nelson  Home: patient declined diuretic regimen, is on Lisinopril 5mg daily  > Here: Lisinopril 5mg daily  > Monitor volume status, lytes  > I/O, daily weights.  > Management as per IM

## 2025-07-21 NOTE — PROGRESS NOTES
Progress Note - PMR   Name: Gold Van 79 y.o. 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  S/p TMA on 6/23 by Dr. Galeas  Unclear if source control  OR Cultures: Serratia, MSSA, Pseudomonas  Blood cultures 6/26 with NGTD  > Per ID continue Cefepime through 8/6 for 6 weeks treatment  > PICC placed on 6/30  > Monitor labs at least weekly for toxicities  > Pain management  > Close incisional monitoring and care.  > NWB RLE  > PT/OT 3-5 hours/day, 5-7 days/week  > f/u Podiatry ~ 7/22 9:00 a   Patient f/u w/ Dr. Galeas in 7/8: sutures removed; will need weekly debridement; full heel weight bearing and avoid heel - to toe gait in surgical shoe    7/15: improved healing at dehiscence area; surgical debridement performed  > outpatient f/u with ID   - 7/22 Home PT/OT/RN; family training for IV management has been completed and will continue over the weekend for repetition  Foot drop, left  L ankle weakness in both PF/DF/EHL  Unclear if related to previous CVA in 2024 (CVA in setting of COPD exacerbation, but presented like impaired coordination)  Also with peripheral neuropathy  Denies back/radicular pain.  Was present in last [facility] admission in 2024.   Previously attempted to get AFO, but he declined it due to copay.   > Outpatient f/u with podiatry  CVA (cerebrovascular accident) (formerly [facility])  10/2024  Presented with: loss of coordination in the setting of a COPD exacerbation. Imaging with significant and severe intracranial stenosis and atherosclerotic diseases as well as multiple foci of acute infarcts involving bilateral frontoparietal cortices.   Felt to be 2/2 hypoperfusion with question of hypercoag related to COVID>  Was on DAPT for 90 days, followed by ASA  monotherapy  Also found to have Afib.   > Continue Plavix, Statin, Eliquis   Neuropathy  Likely 2/2 T2DM   Has some stocking dependent sensation impairment  May impact balance  No associated pain  >Close monitoring of skin/incision.   Pressure injury of skin of sacral region  POA   >Frequent off loading  Turn patient Q2hr in bed  Specialty cushion when OOB   Local care as per wound care   Consulted nutrition/wound care for continuity  Outpatient f/u with wound care clinic   Pressure injury of right heel, stage 1  POA to ARC  > Elevate heels of bed  > Allevyn daily and monitoring Qshift  > Outpatient f/u with Podiatry   Acute pain  Minimal  Mostly in sacrum  > Tylenol PRN  > Oxycodone 2.5-5mg Q4hr PRN  > Monitor and wean as tolerated   Multiple open wounds of lower leg  Dermatitis associated with moisture  POA to ARC  > Continue local wound care to pretibial wounds  Skin Care orders 7/15:  1-Hydraguard to Left heels 2 times per day and as needed    2-EHOB/WAFFLE or Roho  cushion when out of bed in chair.  3-Moisturize skin daily with skin nourishing cream  4-Elevate heels to offload pressure. EHOB off loading boots when in bed   5-Turn/reposition every 2 hours to offload and prevent pressure   6. Apply silicone foam to the right heel ramone with a P an ddate peel and check skin integrity every shift change every 3 days   7. Cleanse sacrum, buttocks with soap and water apply triad paste to the wound on the right buttocks only then a silicone foam ramone with a T and date check skin integrity every shift and change every other day or when soiled   8. P 500 low air loss mattress   9. Dr. 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     Severe protein-calorie malnutrition (HCC)  Malnutrition Findings:   Adult Malnutrition type: Chronic illness  Adult Degree of Malnutrition: Other severe protein calorie malnutrition  Malnutrition Characteristics: Inadequate energy, Weight loss                  360 Statement: Severe  protein/calorie malnutrition r/t inadequate intake as evidenced by 12.8% unplanned wt loss since 4/8/25, Consuming < 75% energy intake compared to estimated energy needs > 1 month, nonhealing wounds. Treated with oral diet + ONS of Isaac bid and Ensure Plus HP 1 x day    BMI Findings:           Body mass index is 19.52 kg/m².   > Nutrition consulted   >encourage PO intake  At risk for venous thromboembolism (VTE)  Fully anticoagulated on eliquis, SCDs, Ambulation   Chronic heart failure with preserved ejection fraction (HFpEF) ([facility])  Wt Readings from Last 3 Encounters:   07/21/25 65.3 kg (143 lb 15.4 oz)   06/26/25 62.1 kg (137 lb)   06/10/25 67.1 kg (148 lb)   6/26 Echo with EF 50-55%, Diastolic function WNL  Follows with Dr. Nelson  Home: patient declined diuretic regimen, is on Lisinopril 5mg daily  > Here: Lisinopril 5mg daily  > Monitor volume status, lytes  > I/O, daily weights.  > Management as per IM  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[facility])  Home: Breztri BID, Albuterol PRN, Singulair nightly  Here: Budesonide-Glycopyrrol-Formoterol BID, Singulair QHS, Albuterol PRN; scheduled tessalon pearls    No acute exacerbation    Management as per IM  F/u [facility] [place]  CXR 7/17: negative for acute cardiopulm disease   Essential hypertension  Home: Atenolol 25mg daily, Lisinopril 5mg daily  > Here: Atenolol 25mg daily, Lisinopril 5mg daily  Gastroesophageal reflux disease without esophagitis  Home: Famotidine 20mg BID  Here: Famotidine daily  Hyponatremia  Chronically 130-132  Here: 135  Not receiving any treatment currently  Monitor with routine blood work   PAF (paroxysmal atrial fibrillation) ([facility])  Home: Eliquis mg BID, no rate/rhythm control  Here: Same.  Monitor and adjust as appropriate  Severe peripheral arterial disease ([facility])  S/p R CFA/SFA endarterectomy w/ bovine pericardial patch, DCB and stenting of SFA, and angio of AT 5/13/25 ([facility])   > Plavix, Statin, Eliquis  > Monitor neurovascular exam at least daily  >  f/u Vascular surgery   Type 2 diabetes mellitus with complication, without long-term current use of insulin (HCC)  Lab Results   Component Value Date    HGBA1C 6.6 (H) 06/18/2025       Recent Labs     07/20/25  1112 07/20/25  1608 07/21/25  0702 07/21/25  1118   POCGLU 217* 305* 103 206*       Blood Sugar Average: Last 72 hrs:  (P) 183Home: Glipizide 10mg BID, Linagliptin 5mg daily (listed as not taking), Metformin 500mg at dinner   Here: Lantus 12 units QHS, Lispro 4 units TID with meals, CDI/Accuchecks.  Will need to transition to or optimize home meds, or else patient will need to diabetes kit/education.  Management as per IM.     Pruritic condition  At home on prednisone 5mg daily  Continued here, but at risk for poor blood sugar control and infection  Monitor for now.   Orthostatic hypotension  Significant orthostatic hypotension w/ dizziness  TEDS and binder when OOB - discontinued 7/17  Midodrine 2.5 BID started 7/10  Encourage PO inake     DVT ppx: Eliquis    History of Present Illness   9 y.o. male patient who originally presented to the hospital on 6/17/2025 due to lethargy, hypotenstion, and fever at wound care appointment. The patient was found to be in septic shock, metabolic acidosis, and GERDA in the ED. The patient was admitted to critical care service and started on IV abx and IV fluids. He continued to require vasopressors. Podiatry consulted and MRI right foot ordered which showed findings concerning for early osteomyelitis in the residual fourth metatarsal. Given improvement of the patient he was switched to SLIM service on 6/18. The patient taken to the OR on 6/23/25 for a TMA right foot. Wound culture grew Pseudomonas and Serratia, in addition to MSSA. Patient was noted to have 1 of 2 blood cultures positive for MSSA. ID consulted and felt given evidence of soft tissue infection, growth of MSSA in soft tissue culture and septic shock on presentation, this is most likely true bacteremia. IV abx  were de-escalated to IV Cefepime. The patient underwent TTE without obvious vegetation. No need for DENISE per ID. Repeat blood cultures were negative. PICC line was placed on 6/30/25. PT/OT recommended rehab.     Chief Complaint: f/u ambulatory dysfunction    Interval: Patient seen and examined in chair in therapy. No events overnight.  Reports a throbbing pain in his L foot occasionally at night. Last BM 7/21. Sleeping well. States he was a bit dizzy this AM when they took his BP. Encouraged pt to take midodrine early in the morning when he goes home.     Review of Systems   Constitutional:  Negative for chills and fever.   Respiratory:  Positive for cough. Negative for shortness of breath.    Cardiovascular:  Negative for chest pain, palpitations and leg swelling.   Gastrointestinal:  Negative for abdominal pain, constipation, diarrhea, nausea and vomiting.       Objective   Functional Update:  Physical Therapy Occupational Therapy Speech Therapy   Weight Bearing Status: Weight Bearing as Tolerated (WBAT R LE through heel in surgical shoe)  Transfers: Supervision  Bed Mobility: Supervision  Amulation Distance (ft): 150 feet  Ambulation: Incidental Touching  Assistive Device for Ambulation: Roller Walker  Wheelchair Mobility Distance:  (N/A)  Wheelchair Mobility:  (N/A)  Number of Stairs: 14  Assistive Device for Stairs: Bilateral Hand Rails  Stair Assistance: Incidental Touching  Ramp:  (TBA)  Assistive Device for Ramp:  (TBA)  Discharge Recommendations: Home with:  DC Home with:: Family Support, First Floor Setup, Home Physical Therapy   Eating: Independent  Grooming: Supervision  Bathing: Incidental Touching  Bathing: Incidental Touching  Upper Body Dressing: Supervision  Lower Body Dressing: Maximum Assistance  Toileting: Supervision  Tub/Shower Transfer:  (TBA)  Toilet Transfer: Supervision  Cognition: Within Defined Limits  Orientation: Person, Place, Time, Situation                   Temp:  [97.7 °F (36.5  °C)-98.3 °F (36.8 °C)] 98.3 °F (36.8 °C)  HR:  [81-88] 81  Resp:  [16] 16  BP: ()/(54-63) 87/58  SpO2:  [97 %-98 %] 97 %    Physical Exam  Constitutional:       General: He is not in acute distress.  HENT:      Head: Normocephalic and atraumatic.      Mouth/Throat:      Mouth: Mucous membranes are moist.      Pharynx: Oropharynx is clear.     Cardiovascular:      Rate and Rhythm: Normal rate and regular rhythm.   Pulmonary:      Effort: Pulmonary effort is normal. No respiratory distress.      Breath sounds: Normal breath sounds. No rhonchi.   Abdominal:      General: Bowel sounds are normal. There is no distension.     Musculoskeletal:         General: No tenderness. Normal range of motion.     Skin:     General: Skin is warm and dry.      Findings: Bruising present.      Comments: RLE bandaged     Neurological:      Mental Status: He is alert and oriented to person, place, and time. Mental status is at baseline.     Psychiatric:         Mood and Affect: Mood normal.         Behavior: Behavior normal.           Scheduled Meds:  Current Facility-Administered Medications   Medication Dose Route Frequency Provider Last Rate    acetaminophen  650 mg Oral Q6H PRN Annie L Dagnall, PA-C      albuterol  2 puff Inhalation Q4H PRN Annie L Dagnall, PA-C      apixaban  5 mg Oral BID Annie L Dagnall, PA-C      atenolol  25 mg Oral Daily Annie L Dagnall, PA-C      atorvastatin  40 mg Oral QPM Annie L Chelseynall, PA-C      benzonatate  200 mg Oral TID Julianna Snyder MD      [place]-Glycopyrrol-Formoterol  2 puff Inhalation Q12H [facility] Ilana Payne PA-C      cefepime  2,000 mg Intravenous Q8H BRODERICK MckayC 2,000 mg (07/21/25 8945)    clopidogrel  75 mg Oral Daily Annie L Dagnall, PA-C      famotidine  20 mg Oral Daily Annie L Dagnall, PA-C      finasteride  5 mg Oral Daily Christian Hendrickson MD      glipiZIDE  5 mg Oral Daily With Breakfast Annie L Dagnall, PA-C      guaiFENesin  1,200 mg Oral Q12H  [facility] Julianna Snyder MD      insulin lispro  1-5 Units Subcutaneous TID With Meals Annie Martines PA-C      lisinopril  2.5 mg Oral Daily Annie Martines, RANGEL      melatonin  3 mg Oral HS Julianna Snyder MD      metFORMIN  500 mg Oral Daily With Dinner Annie Martines PA-C      midodrine  2.5 mg Oral TID AC Christian Hendrickson MD      montelukast  10 mg Oral HS Annie L Felecial, PA-C      multivitamin-minerals  1 tablet Oral Daily Annie L Dagnall, PA-C      ondansetron  4 mg Oral Q6H PRN Annie L Dagnall, PA-C      oxyCODONE  2.5 mg Oral Q4H PRN Annie L Dagnall, PA-C      Or    oxyCODONE  5 mg Oral Q4H PRN Annie L Dagnall, PA-C      polyethylene glycol  17 g Oral Daily PRN Annie L Dagnall, PA-C      predniSONE  10 mg Oral Every Other Day Binh Hurd, DO      predniSONE  5 mg Oral Every Other Day Binh Hurd, DO      sodium chloride  2 spray Each Nare Q1H PRN Annie L Conrad, PA-C           Lab Results: I have reviewed the following results:  Results from last 7 days   Lab Units 07/21/25  0557 07/17/25  0627   HEMOGLOBIN g/dL 9.2* 9.2*   HEMATOCRIT % 30.1* 29.7*   WBC Thousand/uL 8.78 7.50   PLATELETS Thousands/uL 257 235     Results from last 7 days   Lab Units 07/21/25  0557 07/17/25  0627   BUN mg/dL 31* 36*   SODIUM mmol/L 135 133*   POTASSIUM mmol/L 4.5 4.5   CHLORIDE mmol/L 102 102   CREATININE mg/dL 0.70 0.82   AST U/L 13  --    ALT U/L 15  --               Ilana Payne PA-C  Physical Medicine and Rehabilitation   07/21/25

## 2025-07-21 NOTE — NURSING NOTE
[facility]  RN Shift Note        Vitals  Vital Signs  Temperature: 98.3 °F (36.8 °C)  Temp Source: Temporal  Pulse: 81  Heart Rate Source: Monitor  Respirations: 16  Blood Pressure: 130/61  MAP (mmHg): 72  BP Location: Left arm  BP Method: Automatic  Patient Position - Orthostatic VS: Sitting        Follow up/Interventions-    Pain Pain Score: 0  Hospital Pain Intervention(s): Medication (See MAR)    Follow up/Interventions-    GI   (WNL/X)  LBM  Incontinence (yes/no)     Gastrointestinal (WDL): Exceptions to WDL  Last BM Date: 07/21/25  Bowel Incontinence: No    Follow up/Interventions-       (WNL/X)  Incontinence (yes/no)  Bladder Scan  Urine Output  Shift Total Output  Unmeasured Occurrence   Genitourinary (WDL): Within Defined Limits  Urinary Incontinence: No    Urine: 400 mL    Unmeasured Urine Occurrence: 1      Follow up/Interventions-    Nutrition  Diet/Precautions   PO Intake  Meal Consumption   Nutrition  Feeding: Able to feed self  Diet Type: Diabetic  Appetite: Fair  P.O.: 400 mL  Percent Meals Eaten (%): 100      Strategies/Interventions-   Follow up-    LDA  Drain Output             Follow up/Interventions-    Skin   (WNL/X)  Integrity  Pressure Injury YES/NO: no     Integumentary (WDL): Exceptions to WDL  Skin Integrity: Redness, Bruising  Description: sacral dressing, right foot dressing    Follow up/Interventions- sacral dressing changed 7/20-c/d/I. Right heel dressing- no date on dressing- c/d/I- patient requesting dressing to be changed on 7/22 at wound care center along with right tma dressing change. Changed mepilex dressings to b/l elbows for prevention.   Behavior/Mood  Behavior log YES/NO: no Patient Behaviors/Mood: Calm, Cooperative    Follow up/Interventions-    Sleep Pattern  Sleep log no        Discharge Planning Education  (Medication/Device/Injections/Etc)     Verbal education provided on IV antibiotic, preventative care for wounds   ADL/Mobility Summary  (transfer,  bed mobility, ambulation)   Supervision RW     Miscellaneous

## 2025-07-21 NOTE — PROGRESS NOTES
07/21/25 1230   Pain Assessment   Pain Assessment Tool 0-10   Pain Score No Pain   Restrictions/Precautions   Precautions Bed/chair alarms;Fall Risk;Limb alert;Multiple lines   RLE Weight Bearing Per Order WBAT  (through heel with surgical shoe)   ROM Restrictions No   Braces or Orthoses Other (Comment)  (surgical shoe R LE, L LE JOSEFA)   Grooming   Able To Acquire Items   Limitation Noted In Safety;Strength   Shower/Bathe Self   Type of Assistance Needed Set-up / clean-up   Physical Assistance Level No physical assistance   Shower/Bathe Self CARE Score 5   Bathing   Assessed Bath Style Sponge Bath   Anticipated D/C Bath Style Shower;Sponge Bath   Able to Gather/Transport Yes   Able to Adjust Water Temperature Yes   Able to Wash/Rinse/Dry (body part) Left Arm;Right Arm;L Upper Leg;R Upper Leg;L Lower Leg/Foot;R Lower Leg/Foot;Chest;Abdomen;Perineal Area;Buttocks   Limitations Noted in Balance;Endurance;ROM;Safety;Strength   Positioning Seated;Standing   Upper Body Dressing   Type of Assistance Needed Set-up / clean-up   Upper Body Dressing CARE Score 5   Lower Body Dressing   Type of Assistance Needed Physical assistance   Physical Assistance Level 25% or less   Comment Assist to thread sx shoe through pants   Lower Body Dressing CARE Score 3   Dressing/Undressing Clothing   Remove UB Clothes Pullover Shirt   Don UB Clothes Pullover Shirt   Remove LB Clothes Shorts;Undergarment   Don LB Clothes Pants;Undergarment   Limitations Noted In Balance;Endurance;Safety;Strength;ROM   Positioning Supported Sit;Standing   Sit to Stand   Type of Assistance Needed Independent   Comment RW   Sit to Stand CARE Score 6   Bed-Chair Transfer   Type of Assistance Needed Independent   Comment RW   Chair/Bed-to-Chair Transfer CARE Score 6   Toileting Hygiene   Type of Assistance Needed Independent   Toileting Hygiene CARE Score 6   Toileting   Able to Pull Clothing down yes, up yes.   Able to Manage Clothing Closures Yes   Manage  Hygiene Bladder   Limitations Noted In Balance;ROM;Safety;UE Strength;LE Strength   Adaptive Equipment Grab Bar   Toilet Transfer   Type of Assistance Needed Supervision   Comment RW   Toilet Transfer CARE Score 4   Toilet Transfer   Surface Assessed Raised Toilet   Transfer Technique Standard   Limitations Noted In Balance;Endurance;ROM;Safety;UE Strength;LE Strength   Adaptive Equipment Grab Bar;Walker   Exercise Tools   UE Ergometer Mod resistance 7m forward, 7m backward   Theraputty Red mod resistance theraputty 15 small item retrieval using BUEs; focus on bilat FMC   Other Exercise Tool 1 Shut the Box game to work on attention, sequencing, and reaching   Cognition   Overall Cognitive Status WFL   Arousal/Participation Alert;Responsive;Cooperative   Attention Within functional limits   Orientation Level Oriented X4   Memory Decreased recall of precautions   Following Commands Follows one step commands without difficulty   Additional Activities   Additional Activities Other (Comment)   Additional Activities Comments Fxl mobility to and from OT room using RW with supervision   Assessment   Treatment Assessment Pt agreeable to OT session this PM; received sitting upright in recliner. ADLs, fxl mobility, and therex session completed; current LOF and details listed in respective sections. Pt overall set-up grooming, bathing, and UB dressing; Virgie LB dressing; independent toilet hygiene, sit to stand and bed-chair transfer; supervision toilet transfer. OTS and PT discussed and agreed to keep pt at supervision for toilet transfer rather than mod I due to continuous fatigue and episodes of orthostatic hypotension. Pt tolerated therex well following ADLs. Pt's barriers impacting performance include decreased strength, decreased ROM, decreased endurance, impaired balance, decreased safety awareness, decreased skin integrity, and pain. Discharge planning ongoing to prepare for d/c home tomorrow.   Prognosis Good   Problem  List Decreased strength;Decreased range of motion;Decreased endurance;Impaired balance;Decreased safety awareness   Plan   Treatment/Interventions ADL retraining;Functional transfer training;Therapeutic exercise;Endurance training;Patient/family training;Equipment eval/education;Compensatory technique education   Progress Progressing toward goals   OT Therapy Minutes   OT Time In 1230   OT Time Out 1400   OT Total Time (minutes) 90   OT Mode of treatment - Individual (minutes) 90   OT Mode of treatment - Concurrent (minutes) 0   Therapy Time missed   Time missed? No

## 2025-07-21 NOTE — ASSESSMENT & PLAN NOTE
Lab Results   Component Value Date    HGBA1C 6.6 (H) 06/18/2025       Recent Labs     07/20/25  1112 07/20/25  1608 07/21/25  0702 07/21/25  1118   POCGLU 217* 305* 103 206*       Blood Sugar Average: Last 72 hrs:  (P) 183Home: Glipizide 10mg BID, Linagliptin 5mg daily (listed as not taking), Metformin 500mg at dinner   Here: Lantus 12 units QHS, Lispro 4 units TID with meals, CDI/Accuchecks.  Will need to transition to or optimize home meds, or else patient will need to diabetes kit/education.  Management as per IM.

## 2025-07-22 ENCOUNTER — HOME CARE VISIT (OUTPATIENT)
Dept: HOME HEALTH SERVICES | Facility: HOME HEALTHCARE | Age: 80
End: 2025-07-22
Payer: COMMERCIAL

## 2025-07-22 ENCOUNTER — HOME CARE VISIT (OUTPATIENT)
Dept: HOME HEALTH SERVICES | Facility: HOME HEALTHCARE | Age: 80
End: 2025-07-22
Attending: INTERNAL MEDICINE
Payer: COMMERCIAL

## 2025-07-22 ENCOUNTER — OFFICE VISIT (OUTPATIENT)
Facility: HOSPITAL | Age: 80
DRG: 559 | End: 2025-07-22
Payer: COMMERCIAL

## 2025-07-22 VITALS
SYSTOLIC BLOOD PRESSURE: 128 MMHG | DIASTOLIC BLOOD PRESSURE: 68 MMHG | TEMPERATURE: 97.2 F | RESPIRATION RATE: 18 BRPM | HEART RATE: 84 BPM | OXYGEN SATURATION: 97 %

## 2025-07-22 VITALS
TEMPERATURE: 98.2 F | SYSTOLIC BLOOD PRESSURE: 134 MMHG | RESPIRATION RATE: 18 BRPM | DIASTOLIC BLOOD PRESSURE: 68 MMHG | HEART RATE: 89 BPM

## 2025-07-22 DIAGNOSIS — T81.31XD DEHISCENCE OF OPERATIVE WOUND, SUBSEQUENT ENCOUNTER: Primary | ICD-10-CM

## 2025-07-22 PROCEDURE — 10330064 SPONGE, GAUZE 8PLY N/S 4X4" (200/PK 20P"

## 2025-07-22 PROCEDURE — G0299 HHS/HOSPICE OF RN EA 15 MIN: HCPCS

## 2025-07-22 PROCEDURE — 10330064 PAD, ABD 5X9 STR LF (1/PK 20PK/BX) MGM1"

## 2025-07-22 PROCEDURE — 10330064 TAPE, ADHSV TRANSPORE WHT 1 (12RL/BX 10"

## 2025-07-22 PROCEDURE — 10330064 BANDAGE, ELAS SLF-CLSR PREM N/S LF 4X5YD

## 2025-07-22 PROCEDURE — 10330064 DRESSING, HYDROCELLULAR ADH STR FOAM 4X"

## 2025-07-22 PROCEDURE — 11042 DBRDMT SUBQ TIS 1ST 20SQCM/<: CPT | Performed by: PODIATRIST

## 2025-07-22 PROCEDURE — 400013 VN SOC

## 2025-07-22 PROCEDURE — 10330064 BANDAGE, CNFRM 4X4.1YDS N/S LF (12RL/BG"

## 2025-07-22 RX ORDER — LIDOCAINE 40 MG/G
CREAM TOPICAL ONCE
Status: DISCONTINUED | OUTPATIENT
Start: 2025-07-22 | End: 2025-07-22

## 2025-07-22 RX ADMIN — LIDOCAINE: 40 CREAM TOPICAL at 09:46

## 2025-07-22 NOTE — ASSESSMENT & PLAN NOTE
Lab Results   Component Value Date    HGBA1C 6.6 (H) 06/18/2025       Recent Labs     07/21/25  0702 07/21/25  1118 07/21/25  1632 07/22/25  0726   POCGLU 103 206* 367* 125       Blood Sugar Average: Last 72 hrs:  (P) 196.1Home: Glipizide 10mg BID, Linagliptin 5mg daily (listed as not taking), Metformin 500mg at dinner   Here: Lantus 12 units QHS, Lispro 4 units TID with meals, CDI/Accuchecks.  Will need to transition to or optimize home meds, or else patient will need to diabetes kit/education.  Management as per IM.

## 2025-07-22 NOTE — ASSESSMENT & PLAN NOTE
Malnutrition Findings:   Adult Malnutrition type: Chronic illness  Adult Degree of Malnutrition: Other severe protein calorie malnutrition  Malnutrition Characteristics: Inadequate energy, Weight loss                  360 Statement: Severe protein/calorie malnutrition r/t inadequate intake as evidenced by 12.8% unplanned wt loss since 4/8/25, Consuming < 75% energy intake compared to estimated energy needs > 1 month, nonhealing wounds. Treated with oral diet + ONS of Isaac bid and Ensure Plus HP 1 x day    BMI Findings:           Body mass index is 19.2 kg/m².   > Nutrition consulted   >encourage PO intake

## 2025-07-22 NOTE — NURSING NOTE
Patient and wife verbalized understanding of dc instructions for home, f/u appointments, HHC, IV antibiotics, wound care, medications for home, s/s stroke, signs of infection to TMA site. Interdisciplinary care team determined safest means of transport is via car. Patient discharged to car outside of lobby via wc with assist of PCA. Patient supervision for car xfr.

## 2025-07-22 NOTE — ASSESSMENT & PLAN NOTE
2/2 nonhealing wound, presented in septic shock.  S/p TMA on 6/23 by Dr. Galeas  Unclear if source control  OR Cultures: Serratia, MSSA, Pseudomonas  Blood cultures 6/26 with NGTD  > Per ID continue Cefepime through 8/6 for 6 weeks treatment  > PICC placed on 6/30  > Monitor labs at least weekly for toxicities  > Pain management  > Close incisional monitoring and care.  > NWB RLE  > PT/OT 3-5 hours/day, 5-7 days/week  > f/u Podiatry ~ 7/22 9:00 a   Patient f/u w/ Dr. Galeas in 7/8: sutures removed; will need weekly debridement; full heel weight bearing and avoid heel - to toe gait in surgical shoe    7/15: improved healing at dehiscence area; surgical debridement performed  > outpatient f/u with ID    7/22: WBAT right foot in surgical shoe   Every other day acticoat packing changes, prophase for biofilm control   Weekly debridement  - 7/22 Home PT/OT/RN; family training for IV management has been completed and will continue over the weekend for repetition

## 2025-07-22 NOTE — PHYSICAL THERAPY NOTE
PT DISCHARGE SUMMARY    Patient HAS met max benefit from inpatient [facility] PT.  Patient mod I Bed mobility; mod I Transfers with RW; S Gait with RW on level and unlevel surfaces.  S Stairs with B/L HR assist.  Patient continues to require S for mobility due to orthostasis concerns, frequent posterior LOB due to R LE surgical shoe and WB through heel.  Will benefit from continued PT services post discharge.

## 2025-07-22 NOTE — PLAN OF CARE
Problem: PAIN - ADULT  Goal: Verbalizes/displays adequate comfort level or baseline comfort level  Description: Interventions:  - Encourage patient to monitor pain and request assistance  - Assess pain using appropriate pain scale  - Administer analgesics as ordered based on type and severity of pain and evaluate response  - Implement non-pharmacological measures as appropriate and evaluate response  - Consider cultural and social influences on pain and pain management  - Notify physician/advanced practitioner if interventions unsuccessful or patient reports new pain  - Educate patient/family on pain management process including their role and importance of  reporting pain   - Provide non-pharmacologic/complimentary pain relief interventions  Outcome: Progressing     Problem: INFECTION - ADULT  Goal: Absence or prevention of progression during hospitalization  Description: INTERVENTIONS:  - Assess and monitor for signs and symptoms of infection  - Monitor lab/diagnostic results    - [place] appropriate cooling/warming therapies per order  - Administer medications as ordered  - Instruct and encourage patient and family to use good hand hygiene technique  - Identify and instruct in appropriate isolation precautions for identified infection/condition  Outcome: Progressing     Problem: SAFETY ADULT  Goal: Patient will remain free of falls  Description: INTERVENTIONS:  - Educate patient/family on patient safety including physical limitations  - Instruct patient to call for assistance with activity   - OT/PT to assist with strengthening/mobility   - Keep Call bell within reach  - Keep bed low and locked with side rails adjusted as appropriate  - Keep care items and personal belongings within reach  - Initiate and maintain comfort rounds  - Make Fall Risk Sign visible to staff    - Apply yellow socks and bracelet for high fall risk patients  - Consider moving patient to room near nurses station  Outcome:  Progressing  Goal: Maintain or return to baseline ADL function  Description: INTERVENTIONS:  -  Assess patient's ability to carry out ADLs; assess patient's baseline for ADL function and identify physical deficits which impact ability to perform ADLs (bathing, care of mouth/teeth, toileting, grooming, dressing, etc.)  - Assess/evaluate cause of self-care deficits   - Assess range of motion  - Assess patient's mobility; develop plan if impaired  - Assess patient's need for assistive devices and provide as appropriate  - Encourage maximum independence but intervene and supervise when necessary  - Involve family in performance of ADLs  - Assess for home care needs following discharge   - OT to assist with ADL evaluation and planning for discharge  - Provide patient education as appropriate  - Monitor gait, balance and fatigue with ambulation    Outcome: Progressing  Goal: Maintains/Returns to pre admission functional level  Description: INTERVENTIONS:  - Set and communicate daily mobility goal to care team and patient/family/caregiver.   - Collaborate with rehabilitation services on mobility goals if consulted  - Out of bed for toileting  - Record patient progress and toleration of activity level   Outcome: Progressing     Problem: DISCHARGE PLANNING  Goal: Discharge to home or other facility with appropriate resources  Description: INTERVENTIONS:  - Identify barriers to discharge w/patient and caregiver  - Arrange for needed discharge resources and transportation as appropriate  - Identify discharge learning needs (meds, wound care, etc.)  - Arrange for interpretive services to assist at discharge as needed  - Refer to Case Management Department for coordinating discharge planning if the patient needs post-hospital services based on physician/advanced practitioner order or complex needs related to functional status, cognitive ability, or social support system  Outcome: Progressing     Problem: Prexisting or High  Potential for Compromised Skin Integrity  Goal: Skin integrity is maintained or improved  Description: INTERVENTIONS:  - Identify patients at risk for skin breakdown  - Assess and monitor skin integrity including under and around medical devices   - Assess and monitor nutrition and hydration status  - Monitor labs  - Assess for incontinence   - Turn and reposition patient  - Assist with mobility/ambulation  - Relieve pressure over joycelyn prominences   - Avoid friction and shearing  - Provide appropriate hygiene as needed including keeping skin clean and dry  - Evaluate need for skin moisturizer/barrier cream  - Collaborate with interdisciplinary team  - Patient/family teaching  - Consider wound care consult    Assess:  - Review Sae scale daily  - Clean and moisturize skin every day   - Inspect skin when repositioning, toileting, and assisting with ADLS  - Assess extremities for adequate circulation and sensation     Bed Management:  - Have minimal linens on bed & keep smooth, unwrinkled  - Change linens as needed when moist or perspiring  - Toileting:  - Offer bedside commode    Activity:  - Mobilize patient 3 times a day  - Encourage activity and walks on unit  - Encourage or provide ROM exercises   - Turn and reposition patient every 2 Hours  - Use appropriate equipment to lift or move patient in bed  - Instruct/ Assist with weight shifting every hour  when out of bed in chair  - Consider limitation of chair time 3 hour intervals    Skin Care:  - Avoid use of baby powder, tape, friction and shearing, hot water or constrictive clothing  - Do not massage red bony areas    Next Steps:    Outcome: Progressing     Problem: Nutrition/Hydration-ADULT  Goal: Nutrient/Hydration intake appropriate for improving, restoring or maintaining nutritional needs  Description: Monitor and assess patient's nutrition/hydration status for malnutrition. Collaborate with interdisciplinary team and initiate plan and interventions as  ordered.  Monitor patient's weight and dietary intake as ordered or per policy. Utilize nutrition screening tool and intervene as necessary. Determine patient's food preferences and provide high-protein, high-caloric foods as appropriate.     INTERVENTIONS:  - Monitor oral intake, urinary output, labs, and treatment plans  - Assess nutrition and hydration status and recommend course of action  - Evaluate amount of meals eaten  - Assist patient with eating if necessary   - Allow adequate time for meals  - Recommend/ encourage appropriate diets, oral nutritional supplements, and vitamin/mineral supplements  - Order, calculate, and assess calorie counts as needed  - Recommend, monitor, and adjust tube feedings and TPN/PPN based on assessed needs  - Assess need for intravenous fluids  - Provide specific nutrition/hydration education as appropriate  - Include patient/family/caregiver in decisions related to nutrition  Outcome: Progressing

## 2025-07-22 NOTE — OCCUPATIONAL THERAPY NOTE
OT DISCHARGE SUMMARY    Pt discharging home this AM with family support. Pt participated in ADL retraining, IADLs, therapeutic exercise, therapeutic activity, and functional transfer training. Pt did not meet goals of toilet transfer, UB dressing, LB dressing, or footwear. Pt reported he has obtained all necessary DME at home.HEP handout administered and pt verbalized understanding. Pt will be discharging home with his wife, home services, and family/friend support nearby.

## 2025-07-22 NOTE — PROGRESS NOTES
Name: Gold Van      : 1945      MRN: [id number]  Encounter Provider: Agustin Galeas DPM  Encounter Date: 2025   Encounter department: [facility] [place]  :  Assessment & Plan  Dehiscence of operative wound, subsequent encounter    Orders:    Wound cleansing and dressings Right Foot; Future    lidocaine (LMX) 4 % cream  - Considering his substantial comorbidities, he is doing great  - Weight-bear as tolerated to the right foot in surgical shoe, we will plan for every other day Acticoat packing changes and he is to use prophase for biofilm control  - he is to return weekly for excisional debridement  - We will consider x-rays over the next couple weeks as a baseline depending on his overall clinical course, continue PICC line      History of Present Illness   Chief Complaint   Patient presents with    Follow Up Wound Care Visit     Right foot wound   Here for wound follow up.  Presents for evaluation management of his right foot, doing overall very well, being discharged from rehab today.  Does have surgical shoe      Objective   /68   Pulse 89   Temp 98.2 °F (36.8 °C)   Resp 18     Physical Exam    Skin:     Comments: Dehiscence of right lateral TMA, no probe to bone, probed periosteum, monomers and granular base, minimal drainage       Wound 25 Pressure Injury Buttocks Mid;Right (Active)   Wound Image   07/15/25 1101   Wound Description GUILLE 25   Pressure Injury Stage DTPI 25 0840   Non-staged Wound Description Partial thickness 25 0840   Wound Length (cm) 0.5 cm 07/15/25 1101   Wound Width (cm) 0.3 cm 07/15/25 1101   Wound Depth (cm) 0.1 cm 07/15/25 1101   Wound Surface Area (cm^2) 0.12 cm^2 07/15/25 1101   Wound Volume (cm^3) 0.008 cm^3 07/15/25 1101   Calculated Wound Volume (cm^3) 0.02 cm^3 07/15/25 1101   Change in Wound Size % 94.74 07/15/25 1101   Drainage Amount None 25   Drainage Description Serous 07/15/25 1101    Miranda-wound Assessment Clean;Dry;Fragile 07/20/25 0840   Treatments Cleansed;Site care 07/20/25 0840   Dressing Foam, Silicon (eg. Allevyn, etc);Gauze 07/22/25 0702   Wound packed? No 07/19/25 1700   Dressing Changed Changed 07/18/25 0735   Patient Tolerance Tolerated well 07/20/25 0840   Dressing Status Clean;Dry;Intact 07/22/25 0702       Wound 05/16/25 Surgical Foot Right (Active)   Wound Image   07/22/25 0916   Enter Mills score: Mills Grade 2: Deep ulcer extended to ligament, tendon, joint capsule, bone, or deep fascia without abscess or osteomyelitis (OM) 07/22/25 0932   Wound Description Granulation tissue;Pink 07/22/25 0932   Non-staged Wound Description Full thickness 07/22/25 0932   Wound Length (cm) 1.1 cm 07/22/25 0932   Wound Width (cm) 0.4 cm 07/22/25 0932   Wound Depth (cm) 0.7 cm 07/22/25 0932   Wound Surface Area (cm^2) 0.35 cm^2 07/22/25 0932   Wound Volume (cm^3) 0.161 cm^3 07/22/25 0932   Calculated Wound Volume (cm^3) 0.31 cm^3 07/22/25 0932   Change in Wound Size % 99.08 07/22/25 0932   Drainage Amount Small 07/22/25 0932   Drainage Description Bloody 07/22/25 0932   Miranda-wound Assessment Intact;Pink 07/22/25 0932   Treatments Cleansed;Elevated;Site care 07/20/25 0840   Wound Site Closure Sutures 07/20/25 2000   Dressing Other (Comment) 07/22/25 0702   Wound packed? Yes 07/22/25 0932   Packing- # inserted 1 07/20/25 0840   Packing- # removed 1 07/22/25 0932   Dressing Changed Changed 07/20/25 0840   Patient Tolerance Tolerated well 07/20/25 0840   Dressing Status Old drainage;Intact 07/22/25 0932       Wound 07/18/25 Other (Comment) Heel Right (Active)   Wound Image   07/22/25 0917   Wound Description Dry;Intact;Pink 07/22/25 0931   Non-staged Wound Description Not applicable 07/22/25 0931   Wound Length (cm) 0 cm 07/22/25 0931   Wound Width (cm) 0 cm 07/22/25 0931   Wound Depth (cm) 0 cm 07/22/25 0931   Wound Surface Area (cm^2) 0 cm^2 07/22/25 0931   Wound Volume (cm^3) 0 cm^3 07/22/25  "0931   Calculated Wound Volume (cm^3) 0 cm^3 07/22/25 0931   Drainage Amount None 07/22/25 0931   Miranda-wound Assessment Dry;Scaly 07/22/25 0931   Treatments Cleansed;Elevated 07/20/25 0840   Dressing Foam, Silicon (eg. Allevyn, etc) 07/22/25 0702   Dressing Status Intact;Dry 07/22/25 0931       Debridement     Date/Time: 7/22/2025 9:00 AM    Universal Protocol:  Consent: Verbal consent obtained  Risks and benefits: risks, benefits and alternatives were discussed  Consent given by: patient  Time out: Immediately prior to procedure a \"time out\" was called to verify the correct patient, procedure, equipment, support staff and site/side marked as required.  Patient understanding: patient states understanding of the procedure being performed  Patient consent: the patient's understanding of the procedure matches consent given  Required items: required blood products, implants, devices, and special equipment available  Patient identity confirmed: verbally with patient    Debridement Details  Performed by: physician  Debridement type: surgical  Level of debridement: subcutaneous tissue  Pain control: lidocaine 4%    Post-debridement measurements  Length (cm): 1.2  Width (cm): 0.4  Depth (cm): 0.7  Percent debrided: 100%  Surface Area (cm^2): 0.38  Area Debrided (cm^2): 0.38  Volume (cm^3): 0.18    Tissue and other material debrided: subcutaneous tissue  Devitalized tissue debrided: biofilm, necrotic debris and slough  Instrument(s) utilized: curette  Technique utilized: excisional  Bleeding: medium  Hemostasis obtained with: pressure  Procedural pain (0-10): insensate  Post-procedural pain: insensate   Response to treatment: procedure was tolerated well       Results from last 6 Months   Lab Units 06/23/25  8517   WOUND CULTURE  3 colonies Serratia marcescens*  3 colonies Pseudomonas aeruginosa*  2 colonies Staphylococcus aureus*  2 colonies  2+ Growth of Staphylococcus aureus*         "

## 2025-07-22 NOTE — PROGRESS NOTES
Wound Procedure Treatment    Performed by: Geeta Garcia RN  Authorized by: Agustin Galeas DPM  Associated wounds:   Wound 05/16/25 Surgical Foot Right  Wound 07/18/25 Other (Comment) Heel Right    Wound cleansed with:  NSS   Applied primary dressing:  Acticoat   Applied secondary dressing:  ABD   Dressing secured with:  Jarad and Tape   Offloading device appllied:  Surgical shoe and Other   Comments:  Acticoat 3  Ace

## 2025-07-22 NOTE — PLAN OF CARE
Problem: PAIN - ADULT  Goal: Verbalizes/displays adequate comfort level or baseline comfort level  Description: Interventions:  - Encourage patient to monitor pain and request assistance  - Assess pain using appropriate pain scale  - Administer analgesics as ordered based on type and severity of pain and evaluate response  - Implement non-pharmacological measures as appropriate and evaluate response  - Consider cultural and social influences on pain and pain management  - Notify physician/advanced practitioner if interventions unsuccessful or patient reports new pain  - Educate patient/family on pain management process including their role and importance of  reporting pain   - Provide non-pharmacologic/complimentary pain relief interventions  7/22/2025 0933 by Alice Ridley RN  Outcome: Adequate for Discharge  7/22/2025 0933 by Alice Ridley RN  Reactivated  7/22/2025 0657 by Alice Ridley RN  Outcome: Adequate for Discharge     Problem: INFECTION - ADULT  Goal: Absence or prevention of progression during hospitalization  Description: INTERVENTIONS:  - Assess and monitor for signs and symptoms of infection  - Monitor lab/diagnostic results    - [place] appropriate cooling/warming therapies per order  - Administer medications as ordered  - Instruct and encourage patient and family to use good hand hygiene technique  - Identify and instruct in appropriate isolation precautions for identified infection/condition  Outcome: Adequate for Discharge     Problem: SAFETY ADULT  Goal: Patient will remain free of falls  Description: INTERVENTIONS:  - Educate patient/family on patient safety including physical limitations  - Instruct patient to call for assistance with activity   - OT/PT to assist with strengthening/mobility   - Keep Call bell within reach  - Keep bed low and locked with side rails adjusted as appropriate  - Keep care items and personal belongings within reach  - Initiate and maintain comfort  rounds  - Make Fall Risk Sign visible to staff    - Apply yellow socks and bracelet for high fall risk patients  - Consider moving patient to room near nurses station  7/22/2025 0933 by Alice Ridley RN  Outcome: Adequate for Discharge  7/22/2025 0933 by Alice Ridley RN  Reactivated  7/22/2025 0657 by Alice Ridley RN  Outcome: Adequate for Discharge

## 2025-07-22 NOTE — PATIENT INSTRUCTIONS
Orders Placed This Encounter   Procedures    Wound cleansing and dressings Right Foot     Right Foot Surgical Wound     Wash your hands with soap and water.  Remove old dressing, discard into plastic bag and place in trash.  Cleanse the wound with vashe wound cleanser if available (if not available may cleanse with unscented soap and water)  prior to applying a clean dressing. Do not use tissue or cotton balls. Do not scrub the wound. Pat dry using gauze.  Shower no     Perform prophase soaks for 5 minutes prior to each dressing change. A bottle was given to you at your appointment.   Apply Acticoat 3 into the foot wound.  Cover with ABD pad.   Secure with becki and tape.  Change dressing every other day    Surgical shoe to right foot   May ambulate only in surgical shoe.        Increase protein in your diet with each meal. 3 to 4 servings per day Meat, chicken, eggs, nut, greek yogurt, legumes, and  lentils are good sources of protein. Isaac protein shakes      Monitor for any signs or symptoms of infection such as increase redness or pain, fever/chills, nausea/vomiting, malodor, or  increased drainage. If you have any signs or symptoms please call the wound center, if after hours or on holiday/weekend call your primary care provider or go to the emergency department to be evaluated.    Please call the Wound Management Center Monday through Friday between 8-430 for any questions.          *A protective foam was placed on your right heel today.      Follow up in 1 week    Continue SLZOFIANA.     Standing Status:   Future     Expiration Date:   7/29/2025

## 2025-07-22 NOTE — NURSING NOTE
[facility]  RN Shift Note        Vitals  Vital Signs  Temperature: 97.5 °F (36.4 °C)  Temp Source: Temporal  Pulse: (!) 109  Heart Rate Source: Apical  Respirations: 18  Blood Pressure: 112/62  MAP (mmHg): 96  BP Location: Right arm  BP Method: Automatic  Patient Position - Orthostatic VS: Sitting        Follow up/Interventions- HR checked apically, 109. Medicine aware. Patient without symptoms. Am Atenolol given as ordered. Will recheck HR prior to discharge.    Pain Pain Score: 0  Hospital Pain Intervention(s): Medication (See MAR)    Follow up/Interventions-    GI   (WNL/X)  LBM  Incontinence (yes/no)     Gastrointestinal (WDL): Within Defined Limits  Last BM Date: 07/21/25  Bowel Incontinence: No    Follow up/Interventions-       (WNL/X)  Incontinence (yes/no)  Bladder Scan  Urine Output  Shift Total Output  Unmeasured Occurrence   Genitourinary (WDL): Within Defined Limits  Urinary Incontinence: No    Urine: 300 mL    Unmeasured Urine Occurrence: 1      Follow up/Interventions-    Nutrition  Diet/Precautions   PO Intake  Meal Consumption   Nutrition  Feeding: Able to feed self  Diet Type: Diabetic  Appetite: Fair  P.O.: 240 mL  Percent Meals Eaten (%): 25      Strategies/Interventions- patient drank all of Isaac supplement with breakfast  Follow up-    LDA  Drain Output             Follow up/Interventions-    Skin   (WNL/X)  Integrity  Pressure Injury YES/NO: yes     Integumentary (WDL): Exceptions to WDL  Skin Integrity: Bruising  Description wound to sacrum- pink-     Follow up/Interventions- seen and evaluated by PM+R- wound treatment orders being changed.    Behavior/Mood  Behavior log YES/NO: no Patient Behaviors/Mood: Calm, Cooperative    Follow up/Interventions-    Sleep Pattern  Sleep log no        Discharge Planning Education  (Medication/Device/Injections/Etc)     Patient to wound care center- wife present for continuing education of dressing change to right TMA site and right  heel.   ADL/Mobility Summary  (transfer, bed mobility, ambulation)   Supervision RW to bathroom.     Miscellaneous

## 2025-07-22 NOTE — ASSESSMENT & PLAN NOTE
S/p R CFA/SFA endarterectomy w/ bovine pericardial patch, DCB and stenting of SFA, and angio of AT 5/13/25 ([facility])   > Plavix, Statin, Eliquis  > Monitor neurovascular exam at least daily  > f/u Vascular surgery

## 2025-07-22 NOTE — PROGRESS NOTES
Progress Note - PMR   Name: Gold Van 79 y.o. male I MRN: [id number]  Unit/Bed#: 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  S/p TMA on 6/23 by Dr. Galeas  Unclear if source control  OR Cultures: Serratia, MSSA, Pseudomonas  Blood cultures 6/26 with NGTD  > Per ID continue Cefepime through 8/6 for 6 weeks treatment  > PICC placed on 6/30  > Monitor labs at least weekly for toxicities  > Pain management  > Close incisional monitoring and care.  > NWB RLE  > PT/OT 3-5 hours/day, 5-7 days/week  > f/u Podiatry ~ 7/22 9:00 a   Patient f/u w/ Dr. Galeas in 7/8: sutures removed; will need weekly debridement; full heel weight bearing and avoid heel - to toe gait in surgical shoe    7/15: improved healing at dehiscence area; surgical debridement performed  > outpatient f/u with ID    7/22: WBAT right foot in surgical shoe   Every other day acticoat packing changes, prophase for biofilm control   Weekly debridement  - 7/22 Home PT/OT/RN; family training for IV management has been completed and will continue over the weekend for repetition  Foot drop, left  L ankle weakness in both PF/DF/EHL  Unclear if related to previous CVA in 2024 (CVA in setting of COPD exacerbation, but presented like impaired coordination)  Also with peripheral neuropathy  Denies back/radicular pain.  Was present in last [facility] admission in 2024.   Previously attempted to get AFO, but he declined it due to copay.   > Outpatient f/u with podiatry  CVA (cerebrovascular accident) (Former[facility])  10/2024  Presented with: loss of coordination in the setting of a COPD exacerbation. Imaging with significant and severe intracranial stenosis and atherosclerotic diseases as well as multiple foci of acute infarcts involving bilateral  frontoparietal cortices.   Felt to be 2/2 hypoperfusion with question of hypercoag related to COVID>  Was on DAPT for 90 days, followed by ASA monotherapy  Also found to have Afib.   > Continue Plavix, Statin, Eliquis   Neuropathy  Likely 2/2 T2DM   Has some stocking dependent sensation impairment  May impact balance  No associated pain  >Close monitoring of skin/incision.   Pressure injury of skin of sacral region  POA   >Frequent off loading  Turn patient Q2hr in bed  Specialty cushion when OOB   Local care as per wound care   Consulted nutrition/wound care for continuity  Outpatient f/u with wound care clinic   Pressure injury of right heel, stage 1  POA to ARC  > Elevate heels of bed  > Allevyn daily and monitoring Qshift  > Outpatient f/u with Podiatry   Acute pain  Minimal  Mostly in sacrum  > Tylenol PRN  > Oxycodone 2.5-5mg Q4hr PRN  > Monitor and wean as tolerated   Multiple open wounds of lower leg  Dermatitis associated with moisture  POA to ARC  > Continue local wound care to pretibial wounds  Skin Care orders 7/15:  1-Hydraguard to Left heels 2 times per day and as needed    2-EHOB/WAFFLE or Roho  cushion when out of bed in chair.  3-Moisturize skin daily with skin nourishing cream  4-Elevate heels to offload pressure. EHOB off loading boots when in bed   5-Turn/reposition every 2 hours to offload and prevent pressure   6. Apply silicone foam to the right heel ramone with a P an ddate peel and check skin integrity every shift change every 3 days   7. Cleanse sacrum, buttocks with soap and water apply triad paste to the wound on the right buttocks only then a silicone foam ramone with a T and date check skin integrity every shift and change every other day or when soiled   8. P 500 low air loss mattress   9. Dr. 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     Severe protein-calorie malnutrition (HCC)  Malnutrition Findings:   Adult Malnutrition type: Chronic illness  Adult Degree of Malnutrition: Other  severe protein calorie malnutrition  Malnutrition Characteristics: Inadequate energy, Weight loss                  360 Statement: Severe protein/calorie malnutrition r/t inadequate intake as evidenced by 12.8% unplanned wt loss since 4/8/25, Consuming < 75% energy intake compared to estimated energy needs > 1 month, nonhealing wounds. Treated with oral diet + ONS of Isaac bid and Ensure Plus HP 1 x day    BMI Findings:           Body mass index is 19.2 kg/m².   > Nutrition consulted   >encourage PO intake  At risk for venous thromboembolism (VTE)  Fully anticoagulated on eliquis, SCDs, Ambulation   Chronic heart failure with preserved ejection fraction (HFpEF) (Formerly [facility])  Wt Readings from Last 3 Encounters:   07/22/25 64.2 kg (141 lb 8.6 oz)   06/26/25 62.1 kg (137 lb)   06/10/25 67.1 kg (148 lb)   6/26 Echo with EF 50-55%, Diastolic function WNL  Follows with Dr. Nelson  Home: patient declined diuretic regimen, is on Lisinopril 5mg daily  > Here: Lisinopril 5mg daily  > Monitor volume status, lytes  > I/O, daily weights.  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  CXR 7/17: negative for acute cardiopulm disease   Essential hypertension  Home: Atenolol 25mg daily, Lisinopril 5mg daily  > Here: Atenolol 25mg daily, Lisinopril 5mg daily  Gastroesophageal reflux disease without esophagitis  Home: Famotidine 20mg BID  Here: Famotidine daily  Hyponatremia  Chronically 130-132  Here: 135  Not receiving any treatment currently  Monitor with routine blood work   PAF (paroxysmal atrial fibrillation) ([facility])  Home: Eliquis mg BID, no rate/rhythm control  Here: Same.  Monitor and adjust as appropriate  Severe peripheral arterial disease ([facility])  S/p R CFA/SFA endarterectomy w/ bovine pericardial patch,  DCB and stenting of SFA, and angio of AT 5/13/25 ([facility])   > Plavix, Statin, Eliquis  > Monitor neurovascular exam at least daily  > f/u Vascular surgery   Type 2 diabetes mellitus with complication, without long-term current use of insulin (HCC)  Lab Results   Component Value Date    HGBA1C 6.6 (H) 06/18/2025       Recent Labs     07/21/25  0702 07/21/25  1118 07/21/25  1632 07/22/25  0726   POCGLU 103 206* 367* 125       Blood Sugar Average: Last 72 hrs:  (P) 196.1Home: Glipizide 10mg BID, Linagliptin 5mg daily (listed as not taking), Metformin 500mg at dinner   Here: Lantus 12 units QHS, Lispro 4 units TID with meals, CDI/Accuchecks.  Will need to transition to or optimize home meds, or else patient will need to diabetes kit/education.  Management as per IM.     Pruritic condition  At home on prednisone 5mg daily  Continued here, but at risk for poor blood sugar control and infection  Monitor for now.   Orthostatic hypotension  Significant orthostatic hypotension w/ dizziness  TEDS and binder when OOB - discontinued 7/17  Midodrine 2.5 BID started 7/10  Encourage PO inake     DVT ppx: Eliquis    History of Present Illness   79 y.o. male patient who originally presented to the hospital on 6/17/2025 due to lethargy, hypotenstion, and fever at wound care appointment. The patient was found to be in septic shock, metabolic acidosis, and GERDA in the ED. The patient was admitted to critical care service and started on IV abx and IV fluids. He continued to require vasopressors. Podiatry consulted and MRI right foot ordered which showed findings concerning for early osteomyelitis in the residual fourth metatarsal. Given improvement of the patient he was switched to SLIM service on 6/18. The patient taken to the OR on 6/23/25 for a TMA right foot. Wound culture grew Pseudomonas and Serratia, in addition to MSSA. Patient was noted to have 1 of 2 blood cultures positive for MSSA. ID consulted and felt given evidence of soft  tissue infection, growth of MSSA in soft tissue culture and septic shock on presentation, this is most likely true bacteremia. IV abx were de-escalated to IV Cefepime. The patient underwent TTE without obvious vegetation. No need for DENISE per ID. Repeat blood cultures were negative. PICC line was placed on 6/30/25. PT/OT recommended rehab.     Chief Complaint: f/u ambulatory dysfunction    Interval: Patient seen and examined in recliner. No events overnight.  Reports feeling well overall. Last BM 7/21. Sleeping well.     Review of Systems   Constitutional:  Negative for chills and fever.   Respiratory:  Negative for shortness of breath.    Cardiovascular:  Negative for chest pain, palpitations and leg swelling.   Gastrointestinal:  Negative for abdominal pain, constipation, diarrhea, nausea and vomiting.       Objective   Functional Update:  Physical Therapy Occupational Therapy Speech Therapy   Weight Bearing Status: Weight Bearing as Tolerated (WBAT R LE through heel in surgical shoe)  Transfers: Supervision  Bed Mobility: Supervision  Amulation Distance (ft): 150 feet  Ambulation: Incidental Touching  Assistive Device for Ambulation: Roller Walker  Wheelchair Mobility Distance:  (N/A)  Wheelchair Mobility:  (N/A)  Number of Stairs: 14  Assistive Device for Stairs: Bilateral Hand Rails  Stair Assistance: Incidental Touching  Ramp:  (TBA)  Assistive Device for Ramp:  (TBA)  Discharge Recommendations: Home with:  DC Home with:: Family Support, First Floor Setup, Home Physical Therapy   Eating: Independent  Grooming: Supervision  Bathing: Incidental Touching  Bathing: Incidental Touching  Upper Body Dressing: Supervision  Lower Body Dressing: Maximum Assistance  Toileting: Supervision  Tub/Shower Transfer:  (TBA)  Toilet Transfer: Supervision  Cognition: Within Defined Limits  Orientation: Person, Place, Time, Situation                   Temp:  [97.5 °F (36.4 °C)-98.6 °F (37 °C)] 98.2 °F (36.8 °C)  HR:  []  89  Resp:  [16-18] 18  BP: (112-148)/(61-68) 134/68  SpO2:  [98 %] 98 %    Physical Exam  Constitutional:       General: He is not in acute distress.  HENT:      Head: Normocephalic and atraumatic.      Mouth/Throat:      Mouth: Mucous membranes are moist.      Pharynx: Oropharynx is clear.   Pulmonary:      Effort: No respiratory distress.   Abdominal:      General: Bowel sounds are normal. There is no distension.     Musculoskeletal:         General: No tenderness. Normal range of motion.     Skin:     General: Skin is warm and dry.      Findings: Bruising present.      Comments: RLE uncovered some dehiscence of lateral portion of incision requiring packing, followed by podiatry     Neurological:      Mental Status: He is alert and oriented to person, place, and time. Mental status is at baseline.     Psychiatric:         Mood and Affect: Mood normal.         Behavior: Behavior normal.           Scheduled Meds:  Current Facility-Administered Medications   Medication Dose Route Frequency Provider Last Rate    acetaminophen  650 mg Oral Q6H PRN Annie L Dagnall, PA-C      albuterol  2 puff Inhalation Q4H PRN Annie L Chelseynall, PA-C      apixaban  5 mg Oral BID Annie L Chelseynall, PA-C      atenolol  25 mg Oral Daily Annie L Dagnall, PA-C      atorvastatin  40 mg Oral QPM Annie L Chelseynall, PA-C      benzonatate  200 mg Oral TID Julianna Snyder MD      Budeson-Glycopyrrol-Formoterol  2 puff Inhalation Q12H [facility] Ilana Payne PA-C      cefepime  2,000 mg Intravenous Q8H BRODERICK MckayC 2,000 mg (07/22/25 0530)    clopidogrel  75 mg Oral Daily Annie L Dagnall, PA-C      famotidine  20 mg Oral Daily Annie L Dagnall, PA-C      finasteride  5 mg Oral Daily Christian Hendrickson MD      glipiZIDE  5 mg Oral Daily With Breakfast Annie Martines PA-C      guaiFENesin  1,200 mg Oral Q12H [facility] Julianna Snyder MD      insulin lispro  1-5 Units Subcutaneous TID With Meals Annie Martines PA-C      lisinopril   2.5 mg Oral Daily Annie L Dagnall, RANGEL      melatonin  3 mg Oral HS Julianna Snyder MD      metFORMIN  500 mg Oral Daily With Dinner Annie L FelecialRANGEL      midodrine  2.5 mg Oral TID AC Christian Hendrickson MD      montelukast  10 mg Oral HS Annie L Dagnall, RANGEL      multivitamin-minerals  1 tablet Oral Daily Annie L Dagnall, PA-C      ondansetron  4 mg Oral Q6H PRN Annie L Dagnall, PA-C      oxyCODONE  2.5 mg Oral Q4H PRN Annie L Dagnall, PA-MICHAEL      Or    oxyCODONE  5 mg Oral Q4H PRN Annie L Dagnall, RANGEL      polyethylene glycol  17 g Oral Daily PRN Annie L Dagnall, RANGEL      predniSONE  10 mg Oral Every Other Day Binh Hurd, DO      predniSONE  5 mg Oral Every Other Day Binh Hurd, DO      sodium chloride  2 spray Each Nare Q1H PRN Annie L RANGEL Martines           Lab Results: I have reviewed the following results:  Results from last 7 days   Lab Units 07/21/25  0557 07/17/25  0627   HEMOGLOBIN g/dL 9.2* 9.2*   HEMATOCRIT % 30.1* 29.7*   WBC Thousand/uL 8.78 7.50   PLATELETS Thousands/uL 257 235     Results from last 7 days   Lab Units 07/21/25  0557 07/17/25  0627   BUN mg/dL 31* 36*   SODIUM mmol/L 135 133*   POTASSIUM mmol/L 4.5 4.5   CHLORIDE mmol/L 102 102   CREATININE mg/dL 0.70 0.82   AST U/L 13  --    ALT U/L 15  --               Ilana Payne PA-C  Physical Medicine and Rehabilitation   07/22/25

## 2025-07-22 NOTE — PROGRESS NOTES
07/22/25 0700   Pain Assessment   Pain Assessment Tool 0-10   Pain Score No Pain   Restrictions/Precautions   Precautions Bed/chair alarms;Fall Risk;Limb alert;Multiple lines   RLE Weight Bearing Per Order WBAT  (through heel with surgical shoe)   ROM Restrictions No   Braces or Orthoses Other (Comment)  (surgical shoe R LE, L LE JOSEFA)   Oral Hygiene   Type of Assistance Needed Independent   Comment Pt able to propel self in w/c to sink and perform oral hygiene seated in w/c   Oral Hygiene CARE Score 6   Shower/Bathe Self   Type of Assistance Needed Independent   Comment Pt able to propel self in w/c to sink and bath self seated in w/c   Shower/Bathe Self CARE Score 6   Bathing   Assessed Bath Style Sponge Bath   Anticipated D/C Bath Style Shower;Sponge Bath   Able to Gather/Transport Yes   Able to Adjust Water Temperature Yes   Able to Wash/Rinse/Dry (body part) Left Arm;Right Arm;L Upper Leg;R Upper Leg;L Lower Leg/Foot;R Lower Leg/Foot;Chest;Abdomen;Perineal Area;Buttocks   Limitations Noted in Balance;Endurance;ROM;Safety;Strength   Positioning Seated;Standing   Upper Body Dressing   Type of Assistance Needed Set-up / clean-up   Upper Body Dressing CARE Score 5   Lower Body Dressing   Type of Assistance Needed Physical assistance   Physical Assistance Level 25% or less   Comment Assist to thread sx shoe through pants   Lower Body Dressing CARE Score 3   Putting On/Taking Off Footwear   Type of Assistance Needed Physical assistance   Physical Assistance Level 26%-50%   Comment PT able to don sneaker on L foot, unable to don sx shoe on L foot   Putting On/Taking Off Footwear CARE Score 3   Picking Up Object   Type of Assistance Needed Independent;Adaptive equipment   Comment Reacher   Picking Up Object CARE Score 6   Dressing/Undressing Clothing   Remove UB Clothes Pullover Shirt   Don UB Clothes Pullover Shirt   Remove LB Clothes Shorts   Don LB Clothes Pants   Limitations Noted In  Balance;Endurance;Safety;Strength;ROM   Sit to Stand   Type of Assistance Needed Independent   Comment RW   Sit to Stand CARE Score 6   Bed-Chair Transfer   Type of Assistance Needed Independent   Comment RW   Chair/Bed-to-Chair Transfer CARE Score 6   Cognition   Overall Cognitive Status WFL   Arousal/Participation Alert;Responsive;Cooperative   Attention Within functional limits   Orientation Level Oriented X4   Memory Decreased recall of precautions   Following Commands Follows one step commands without difficulty   Additional Activities   Additional Activities Other (Comment)   Additional Activities Comments HEP administered and pt verbalized understanding   Assessment   Treatment Assessment Pt agreeable to brief OT session this AM; received sitting upright in w/c. ADL session and discharge planning completed; current LOF and details listed in respective sections. Pt overall independent oral hygiene, picking up object, and bathing; set-up UB dressing; Virgie LB dressing, min/modA footwear. OTS administered HEP and pt verbalized understanding. Pt to d/c home today with family support and home services. Remaining barriers impacting performance include decreased ROM, decreased balance, decreased strength, and orthostatic hypotension.   Discharge Recommendation   Discharge Summary Pt discharging home this AM with family support. Pt participated in ADL retraining, IADLs, therapeutic exercise, therapeutic activity, and functional transfer training. Pt did not meet goals of toilet transfer, UB dressing, LB dressing, or footwear. Pt reported he has obtained all necessary DME at home.HEP handout administered and pt verbalized understanding. Pt will be discharging home with his wife, home services, and family/friend support nearby.   OT Therapy Minutes   OT Time In 0700   OT Time Out 0725   OT Total Time (minutes) 25   OT Mode of treatment - Individual (minutes) 25   OT Mode of treatment - Concurrent (minutes) 0   Therapy  Time missed   Time missed? No

## 2025-07-22 NOTE — ASSESSMENT & PLAN NOTE
Wt Readings from Last 3 Encounters:   07/22/25 64.2 kg (141 lb 8.6 oz)   06/26/25 62.1 kg (137 lb)   06/10/25 67.1 kg (148 lb)   6/26 Echo with EF 50-55%, Diastolic function WNL  Follows with Dr. Nelson  Home: patient declined diuretic regimen, is on Lisinopril 5mg daily  > Here: Lisinopril 5mg daily  > Monitor volume status, lytes  > I/O, daily weights.  > Management as per IM

## 2025-07-23 ENCOUNTER — HOME CARE VISIT (OUTPATIENT)
Dept: HOME HEALTH SERVICES | Facility: HOME HEALTHCARE | Age: 80
End: 2025-07-23
Payer: COMMERCIAL

## 2025-07-23 VITALS — SYSTOLIC BLOOD PRESSURE: 114 MMHG | OXYGEN SATURATION: 95 % | DIASTOLIC BLOOD PRESSURE: 60 MMHG | HEART RATE: 70 BPM

## 2025-07-23 PROCEDURE — G0151 HHCP-SERV OF PT,EA 15 MIN: HCPCS

## 2025-07-23 NOTE — PROGRESS NOTES
07/23/25 1129   Hello, [Guardian’s Name / Patient’s Name], this is [Caller Name] from [facility], and our clinical care team wanted to check on you / your child after your recent visit to the hospital. It will only take 3-5 minutes. Is this a good time?   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 Per Dr. Hendrickson patient needs to take medication as directed which is 3 times per day because using to treat orthostatic hypertension.)   Do you have questions about your medications? Yes - Comment if Indication/ Dose or Schedule   Are you/ your loved one taking all medications as prescribed? No  (skipped todays dose of blood pressure medication because was in normal range. wife questioning if this was right thing to do.)   Are you/ your loved ones having any unusual symptoms or problems? No   Follow up appointments   Follow up appointment with PCP Future appointment NOT scheduled  (not open today, plan to call them back tomorrow)   Healthy Lifestyle   Are you participating in ongoing therapy? Yes   Arc discharge phone call follow ups and opportunities   How well do you feel the team did preparing you to return home? Very well   Is there a need for follow up? Yes, escalated to Physician  (gave patient wife instruction to continue giving blood pressure medication per dr hendrickson until follow up with PCP)   Call Complete   Discharge phone call complete? Complete

## 2025-07-23 NOTE — PROGRESS NOTES
-Received notification from patients [facility] requesting clarification on required weekly lab orders. Providers note reviewed and orders placed.     -Placed weekly lab orders for CBCd and Cre to be collected by [facility] services.

## 2025-07-23 NOTE — CASE MANAGEMENT
Patient progressed well with therapy, discharging home with spouse.  [facility] services set-up through [facility] for PT, OT, RN, reserved in [place], information added to DC instructions. [facility] delivering IV medications today and coordinated with wife.  First RN HHC visit scheduled for 7-22-25 at 2PM to give IV dose at 3PM.  IMM reviewed and signed by patient.  Patient informed to expect follow-up phone call to discuss any questions or concerns that may arise following discharge.  Phone call to patient's wife to review above information,and address any questions or concerns regarding discharge.  Spouse planning to be on unit for 9 AM discharge for appointment at [facility] wound care following.  Will continue to follow for any DC needs.

## 2025-07-24 ENCOUNTER — HOME CARE VISIT (OUTPATIENT)
Dept: HOME HEALTH SERVICES | Facility: HOME HEALTHCARE | Age: 80
End: 2025-07-24
Payer: COMMERCIAL

## 2025-07-24 VITALS
OXYGEN SATURATION: 96 % | TEMPERATURE: 97.4 F | RESPIRATION RATE: 20 BRPM | SYSTOLIC BLOOD PRESSURE: 94 MMHG | HEART RATE: 82 BPM | DIASTOLIC BLOOD PRESSURE: 48 MMHG

## 2025-07-24 VITALS — DIASTOLIC BLOOD PRESSURE: 56 MMHG | HEART RATE: 82 BPM | OXYGEN SATURATION: 96 % | SYSTOLIC BLOOD PRESSURE: 118 MMHG

## 2025-07-24 PROCEDURE — G0299 HHS/HOSPICE OF RN EA 15 MIN: HCPCS

## 2025-07-24 PROCEDURE — G0152 HHCP-SERV OF OT,EA 15 MIN: HCPCS

## 2025-07-25 NOTE — CASE COMMUNICATION
PT initial evaluation completed.  Plan to see [id number], [id number] to address strength/balance deficits, provide gait/stair training to promote safety inside/outside home.  Plan to progress to outpatient PT when appropriate.

## 2025-07-26 ENCOUNTER — HOME CARE VISIT (OUTPATIENT)
Dept: HOME HEALTH SERVICES | Facility: HOME HEALTHCARE | Age: 80
End: 2025-07-26
Payer: COMMERCIAL

## 2025-07-27 ENCOUNTER — TELEPHONE (OUTPATIENT)
Dept: OTHER | Facility: OTHER | Age: 80
End: 2025-07-27

## 2025-07-27 ENCOUNTER — HOME CARE VISIT (OUTPATIENT)
Dept: HOME HEALTH SERVICES | Facility: HOME HEALTHCARE | Age: 80
End: 2025-07-27
Payer: COMMERCIAL

## 2025-07-28 ENCOUNTER — HOME CARE VISIT (OUTPATIENT)
Dept: HOME HEALTH SERVICES | Facility: HOME HEALTHCARE | Age: 80
End: 2025-07-28
Payer: COMMERCIAL

## 2025-07-28 ENCOUNTER — APPOINTMENT (EMERGENCY)
Dept: CT IMAGING | Facility: HOSPITAL | Age: 80
End: 2025-07-28
Payer: COMMERCIAL

## 2025-07-28 ENCOUNTER — APPOINTMENT (EMERGENCY)
Dept: RADIOLOGY | Facility: HOSPITAL | Age: 80
End: 2025-07-28
Payer: COMMERCIAL

## 2025-07-28 ENCOUNTER — HOSPITAL ENCOUNTER (INPATIENT)
Facility: HOSPITAL | Age: 80
LOS: 3 days | Discharge: HOME HEALTH | End: 2025-08-01
Attending: EMERGENCY MEDICINE | Admitting: INTERNAL MEDICINE
Payer: COMMERCIAL

## 2025-07-28 PROBLEM — Z87.898 HISTORY OF BACTEREMIA: Status: ACTIVE | Noted: 2025-07-28

## 2025-07-28 PROBLEM — M25.561 RIGHT KNEE PAIN: Status: ACTIVE | Noted: 2025-07-28

## 2025-07-28 PROBLEM — R53.1 GENERALIZED WEAKNESS: Status: ACTIVE | Noted: 2025-07-28
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
## 2025-07-28 PROBLEM — N40.0 BPH (BENIGN PROSTATIC HYPERPLASIA): Status: ACTIVE | Noted: 2025-07-28

## 2025-07-28 PROBLEM — R21 RASH: Status: ACTIVE | Noted: 2025-07-28

## 2025-07-28 PROCEDURE — G0180 MD CERTIFICATION HHA PATIENT: HCPCS | Performed by: INTERNAL MEDICINE

## 2025-07-29 ENCOUNTER — HOME CARE VISIT (OUTPATIENT)
Dept: HOME HEALTH SERVICES | Facility: HOME HEALTHCARE | Age: 80
End: 2025-07-29
Payer: COMMERCIAL

## 2025-07-29 NOTE — TELEPHONE ENCOUNTER
Contacted patient's spouse to inform them of patient's new appointment date following an adjustment after patient's recent hospital stay.    Spoke with patient's spouse, Mya. I informed her that we have rescheduled patient's appointment to 8/1 in the event patient is still in the ED, and also informed her patient is scheduled as such so we may see them prior to the end date of their antibiotics, 8/6.    Mya verbalizes understanding, and will relay information to patient. Additionally, she inquired who had ordered patient's IV medication and how end date is determined, and I informed Mya that Infectious Disease is managing patient's IV medication and that patient was placed on IV medication for 6 weeks. Furthermore, I explained that a true end date, any plans for extension or transition to other medication(s) is solely determined by the provider and will be discussed at visit.

## 2025-08-01 PROBLEM — M25.561 RIGHT KNEE PAIN: Status: RESOLVED | Noted: 2025-07-28 | Resolved: 2025-08-01

## 2025-08-02 ENCOUNTER — TELEPHONE (OUTPATIENT)
Dept: OTHER | Facility: OTHER | Age: 80
End: 2025-08-02

## 2025-08-02 ENCOUNTER — HOME CARE VISIT (OUTPATIENT)
Dept: HOME HEALTH SERVICES | Facility: HOME HEALTHCARE | Age: 80
End: 2025-08-02
Payer: COMMERCIAL

## 2025-08-03 ENCOUNTER — HOME CARE VISIT (OUTPATIENT)
Dept: HOME HEALTH SERVICES | Facility: HOME HEALTHCARE | Age: 80
End: 2025-08-03
Payer: COMMERCIAL

## 2025-08-03 ENCOUNTER — HOME CARE VISIT (OUTPATIENT)
Dept: HOME HEALTH SERVICES | Facility: HOME HEALTHCARE | Age: 80
End: 2025-08-03
Attending: NURSE PRACTITIONER
Payer: COMMERCIAL

## 2025-08-03 VITALS
RESPIRATION RATE: 18 BRPM | BODY MASS INDEX: 19.5 KG/M2 | SYSTOLIC BLOOD PRESSURE: 116 MMHG | DIASTOLIC BLOOD PRESSURE: 50 MMHG | HEIGHT: 72 IN | OXYGEN SATURATION: 97 % | HEART RATE: 94 BPM | TEMPERATURE: 97.8 F | WEIGHT: 144 LBS

## 2025-08-03 PROCEDURE — G0299 HHS/HOSPICE OF RN EA 15 MIN: HCPCS

## 2025-08-04 ENCOUNTER — HOME CARE VISIT (OUTPATIENT)
Dept: HOME HEALTH SERVICES | Facility: HOME HEALTHCARE | Age: 80
End: 2025-08-04
Payer: COMMERCIAL

## 2025-08-04 VITALS — OXYGEN SATURATION: 92 % | SYSTOLIC BLOOD PRESSURE: 126 MMHG | DIASTOLIC BLOOD PRESSURE: 60 MMHG | HEART RATE: 102 BPM

## 2025-08-04 PROCEDURE — G0151 HHCP-SERV OF PT,EA 15 MIN: HCPCS

## 2025-08-04 PROCEDURE — 10330064 FOAM, ADH SIL W/BORDER 4X4" (10/BX 20BX"

## 2025-08-05 ENCOUNTER — HOME CARE VISIT (OUTPATIENT)
Dept: HOME HEALTH SERVICES | Facility: HOME HEALTHCARE | Age: 80
End: 2025-08-05
Payer: COMMERCIAL

## 2025-08-05 ENCOUNTER — TELEMEDICINE (OUTPATIENT)
Dept: INFECTIOUS DISEASES | Facility: CLINIC | Age: 80
End: 2025-08-05
Payer: COMMERCIAL

## 2025-08-05 VITALS
HEART RATE: 86 BPM | TEMPERATURE: 97.3 F | OXYGEN SATURATION: 96 % | RESPIRATION RATE: 20 BRPM | DIASTOLIC BLOOD PRESSURE: 60 MMHG | SYSTOLIC BLOOD PRESSURE: 138 MMHG

## 2025-08-05 DIAGNOSIS — M86.9 OSTEOMYELITIS OF RIGHT FOOT, UNSPECIFIED TYPE (HCC): ICD-10-CM

## 2025-08-05 DIAGNOSIS — B95.61 MSSA BACTEREMIA: Primary | ICD-10-CM

## 2025-08-05 DIAGNOSIS — E11.8 TYPE 2 DIABETES MELLITUS WITH COMPLICATION, WITHOUT LONG-TERM CURRENT USE OF INSULIN (HCC): Chronic | ICD-10-CM

## 2025-08-05 DIAGNOSIS — R78.81 MSSA BACTEREMIA: Primary | ICD-10-CM

## 2025-08-05 DIAGNOSIS — Z88.1 ALLERGY TO ANTIBIOTIC: ICD-10-CM

## 2025-08-05 PROCEDURE — 99214 OFFICE O/P EST MOD 30 MIN: CPT | Performed by: PHYSICIAN ASSISTANT

## 2025-08-05 PROCEDURE — G0299 HHS/HOSPICE OF RN EA 15 MIN: HCPCS

## 2025-08-06 ENCOUNTER — HOME CARE VISIT (OUTPATIENT)
Dept: HOME HEALTH SERVICES | Facility: HOME HEALTHCARE | Age: 80
End: 2025-08-06
Payer: COMMERCIAL

## 2025-08-06 VITALS — OXYGEN SATURATION: 95 % | DIASTOLIC BLOOD PRESSURE: 62 MMHG | SYSTOLIC BLOOD PRESSURE: 106 MMHG | HEART RATE: 86 BPM

## 2025-08-06 PROCEDURE — G0151 HHCP-SERV OF PT,EA 15 MIN: HCPCS

## 2025-08-07 ENCOUNTER — DOCUMENTATION (OUTPATIENT)
Facility: HOSPITAL | Age: 80
End: 2025-08-07

## 2025-08-07 ENCOUNTER — HOME CARE VISIT (OUTPATIENT)
Dept: HOME HEALTH SERVICES | Facility: HOME HEALTHCARE | Age: 80
End: 2025-08-07
Payer: COMMERCIAL

## 2025-08-07 VITALS
TEMPERATURE: 96.4 F | DIASTOLIC BLOOD PRESSURE: 60 MMHG | SYSTOLIC BLOOD PRESSURE: 90 MMHG | HEART RATE: 92 BPM | RESPIRATION RATE: 20 BRPM | OXYGEN SATURATION: 95 %

## 2025-08-07 PROCEDURE — G0299 HHS/HOSPICE OF RN EA 15 MIN: HCPCS

## 2025-08-08 ENCOUNTER — HOME CARE VISIT (OUTPATIENT)
Dept: HOME HEALTH SERVICES | Facility: HOME HEALTHCARE | Age: 80
End: 2025-08-08
Payer: COMMERCIAL

## 2025-08-11 ENCOUNTER — HOME CARE VISIT (OUTPATIENT)
Dept: HOME HEALTH SERVICES | Facility: HOME HEALTHCARE | Age: 80
End: 2025-08-11
Payer: COMMERCIAL

## 2025-08-12 ENCOUNTER — HOME CARE VISIT (OUTPATIENT)
Dept: HOME HEALTH SERVICES | Facility: HOME HEALTHCARE | Age: 80
End: 2025-08-12
Payer: COMMERCIAL

## 2025-08-12 ENCOUNTER — OFFICE VISIT (OUTPATIENT)
Facility: HOSPITAL | Age: 80
End: 2025-08-12
Payer: COMMERCIAL

## 2025-08-14 ENCOUNTER — HOME CARE VISIT (OUTPATIENT)
Dept: HOME HEALTH SERVICES | Facility: HOME HEALTHCARE | Age: 80
End: 2025-08-14
Payer: COMMERCIAL

## 2025-08-16 ENCOUNTER — HOME CARE VISIT (OUTPATIENT)
Dept: HOME HEALTH SERVICES | Facility: HOME HEALTHCARE | Age: 80
End: 2025-08-16
Payer: COMMERCIAL

## 2025-08-18 ENCOUNTER — HOME CARE VISIT (OUTPATIENT)
Dept: HOME HEALTH SERVICES | Facility: HOME HEALTHCARE | Age: 80
End: 2025-08-18
Payer: COMMERCIAL

## 2025-08-18 VITALS
DIASTOLIC BLOOD PRESSURE: 68 MMHG | RESPIRATION RATE: 18 BRPM | OXYGEN SATURATION: 97 % | HEART RATE: 74 BPM | SYSTOLIC BLOOD PRESSURE: 126 MMHG | TEMPERATURE: 96.6 F

## 2025-08-18 PROCEDURE — 10330064 DRESSING, ADAPTIC 3X3" (50/BX)"

## 2025-08-18 PROCEDURE — G0299 HHS/HOSPICE OF RN EA 15 MIN: HCPCS

## 2025-08-18 PROCEDURE — 10330064 BANDAGE, CNFRM 4X4.1YDS N/S LF (12RL/BG"

## 2025-08-18 PROCEDURE — 10330064 DRESSING, CALCIUM ALGINATE AG SHEET 4X8"

## 2025-08-18 PROCEDURE — 10330064 TAPE, ADHSV TRANSPORE WHT 1 (12RL/BX 10"

## 2025-08-18 PROCEDURE — 10330064 FOAM, ADH SIL W/BORDER 4X4" (10/BX 20BX"

## 2025-08-18 PROCEDURE — 10330064 SPONGE, GAUZE 12PLY STR 4X4" (1/PK 50/B"

## 2025-08-21 ENCOUNTER — OFFICE VISIT (OUTPATIENT)
Facility: HOSPITAL | Age: 80
End: 2025-08-21
Payer: COMMERCIAL

## 2025-08-21 VITALS
TEMPERATURE: 96.9 F | DIASTOLIC BLOOD PRESSURE: 59 MMHG | SYSTOLIC BLOOD PRESSURE: 125 MMHG | HEART RATE: 78 BPM | RESPIRATION RATE: 18 BRPM

## 2025-08-21 DIAGNOSIS — S51.812A SKIN TEAR OF LEFT FOREARM WITHOUT COMPLICATION, INITIAL ENCOUNTER: ICD-10-CM

## 2025-08-21 DIAGNOSIS — L97.922 TRAUMATIC ULCER OF LEFT LOWER LEG WITH FAT LAYER EXPOSED (HCC): ICD-10-CM

## 2025-08-21 DIAGNOSIS — Z89.431 HISTORY OF TRANSMETATARSAL AMPUTATION OF RIGHT FOOT (HCC): ICD-10-CM

## 2025-08-21 DIAGNOSIS — T81.31XD DEHISCENCE OF OPERATIVE WOUND, SUBSEQUENT ENCOUNTER: ICD-10-CM

## 2025-08-21 DIAGNOSIS — S81.002D OPEN WOUND OF LEFT KNEE, SUBSEQUENT ENCOUNTER: Primary | ICD-10-CM

## 2025-08-21 DIAGNOSIS — S70.312A ABRASION, LEFT THIGH, INITIAL ENCOUNTER: ICD-10-CM

## 2025-08-21 PROCEDURE — 11042 DBRDMT SUBQ TIS 1ST 20SQCM/<: CPT | Performed by: STUDENT IN AN ORGANIZED HEALTH CARE EDUCATION/TRAINING PROGRAM

## 2025-08-21 RX ORDER — LIDOCAINE 40 MG/G
CREAM TOPICAL ONCE
Status: COMPLETED | OUTPATIENT
Start: 2025-08-21 | End: 2025-08-21

## 2025-08-21 RX ADMIN — LIDOCAINE 1 APPLICATION: 40 CREAM TOPICAL at 14:12

## 2025-08-22 ENCOUNTER — HOME CARE VISIT (OUTPATIENT)
Dept: HOME HEALTH SERVICES | Facility: HOME HEALTHCARE | Age: 80
End: 2025-08-22
Payer: COMMERCIAL

## 2025-08-25 PROBLEM — Z89.431 HISTORY OF TRANSMETATARSAL AMPUTATION OF RIGHT FOOT (HCC): Status: ACTIVE | Noted: 2025-08-25

## 2025-08-25 PROBLEM — T81.31XD DEHISCENCE OF OPERATIVE WOUND, SUBSEQUENT ENCOUNTER: Status: ACTIVE | Noted: 2025-08-25

## 2025-08-25 PROBLEM — B35.1 ONYCHOMYCOSIS: Status: ACTIVE | Noted: 2025-08-25

## 2025-08-26 PROBLEM — Z91.89 AT RISK FOR VENOUS THROMBOEMBOLISM (VTE): Status: RESOLVED | Noted: 2025-07-05 | Resolved: 2025-08-26

## 2025-08-26 PROBLEM — L89.159 PRESSURE INJURY OF SKIN OF SACRAL REGION: Chronic | Status: RESOLVED | Noted: 2025-06-17 | Resolved: 2025-08-26

## 2025-08-26 PROBLEM — Z89.431 HISTORY OF TRANSMETATARSAL AMPUTATION OF RIGHT FOOT (HCC): Status: RESOLVED | Noted: 2025-08-25 | Resolved: 2025-08-26

## 2025-08-26 PROBLEM — T81.31XD DEHISCENCE OF OPERATIVE WOUND, SUBSEQUENT ENCOUNTER: Status: RESOLVED | Noted: 2025-08-25 | Resolved: 2025-08-26

## 2025-08-26 PROBLEM — R52 ACUTE PAIN: Status: RESOLVED | Noted: 2025-07-05 | Resolved: 2025-08-26

## 2025-08-26 PROBLEM — Z87.898 HISTORY OF BACTEREMIA: Status: RESOLVED | Noted: 2025-07-28 | Resolved: 2025-08-26

## 2025-08-26 PROBLEM — L89.611 PRESSURE INJURY OF RIGHT HEEL, STAGE 1: Status: RESOLVED | Noted: 2025-07-05 | Resolved: 2025-08-26
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
## (undated) DEVICE — CATH SUPPORT RUBICON 4FR 0.018IN 150CM STR

## (undated) DEVICE — SUT SILK 4-0 30 IN A303H

## (undated) DEVICE — NEPTUNE E-SEP SMOKE EVACUATION PENCIL, COATED, 70MM BLADE, PUSH BUTTON SWITCH: Brand: NEPTUNE E-SEP

## (undated) DEVICE — 10FR FRAZIER SUCTION HANDLE: Brand: CARDINAL HEALTH

## (undated) DEVICE — PETRI DISH STERILE

## (undated) DEVICE — VESSEL CANNULA

## (undated) DEVICE — SURGICEL 4 X 8IN

## (undated) DEVICE — CURITY STRETCH BANDAGE: Brand: CURITY

## (undated) DEVICE — BETHLEHEM UNIVERSAL  ARTHRO PK: Brand: CARDINAL HEALTH

## (undated) DEVICE — COBAN 4 IN STERILE

## (undated) DEVICE — CHLORAPREP HI-LITE 26ML ORANGE

## (undated) DEVICE — GUIDEWIRE V-14 SHORT TAPER 300 ST

## (undated) DEVICE — PLUMEPEN PRO 10FT

## (undated) DEVICE — RADIFOCUS GLIDEWIRE: Brand: GLIDEWIRE

## (undated) DEVICE — STERILE BETHLEHEM FEM POP PACK: Brand: CARDINAL HEALTH

## (undated) DEVICE — ABDOMINAL PAD: Brand: DERMACEA

## (undated) DEVICE — Device

## (undated) DEVICE — MEDI-VAC YANKAUER SUCTION HANDLE W/STRAIGHT TIP & CONTROL VENT: Brand: CARDINAL HEALTH

## (undated) DEVICE — PREP PAD BNS: Brand: CONVERTORS

## (undated) DEVICE — KERLIX BANDAGE ROLL: Brand: KERLIX

## (undated) DEVICE — PACLITAXEL-COATED PTA BALLOON CATHETER: Brand: RANGER™

## (undated) DEVICE — NEEDLE 25G X 1 1/2

## (undated) DEVICE — RADIFOCUS TORQUE DEVICE MULTI-TORQUE VISE: Brand: RADIFOCUS TORQUE DEVICE

## (undated) DEVICE — 3M™ V.A.C.® GRANUFOAM™ DRESSING KIT, M8275052, MEDIUM: Brand: 3M™ V.A.C.® GRANUFOAM™

## (undated) DEVICE — INTENDED FOR TISSUE SEPARATION, AND OTHER PROCEDURES THAT REQUIRE A SHARP SURGICAL BLADE TO PUNCTURE OR CUT.: Brand: BARD-PARKER ® CARBON RIB-BACK BLADES

## (undated) DEVICE — SCD SEQUENTIAL COMPRESSION COMFORT SLEEVE MEDIUM KNEE LENGTH: Brand: KENDALL SCD

## (undated) DEVICE — PINNACLE INTRODUCER SHEATH: Brand: PINNACLE

## (undated) DEVICE — DISPOSABLE OR TOWEL: Brand: CARDINAL HEALTH

## (undated) DEVICE — ANTIBACTERIAL UNDYED BRAIDED (POLYGLACTIN 910), SYNTHETIC ABSORBABLE SUTURE: Brand: COATED VICRYL

## (undated) DEVICE — TRAY FOLEY 16FR URIMETER SURESTEP

## (undated) DEVICE — SUT VICRYL 2-0 CT-1 36 IN J945H

## (undated) DEVICE — SUT PROLENE 5-0 BV-1 24 IN 9702H

## (undated) DEVICE — SUT VICRYL 2-0 REEL 54 IN J286G

## (undated) DEVICE — SUT SILK 3-0 30 IN A304H

## (undated) DEVICE — BETADINE PREP STICKS

## (undated) DEVICE — RADIFOCUS GLIDECATH: Brand: GLIDECATH

## (undated) DEVICE — NEEDLE 18 G X 1 1/2

## (undated) DEVICE — DRAPE INTESTINAL ISOLATION BAG

## (undated) DEVICE — SUT VICRYL 2-0 SH 27 IN UNDYED J417H

## (undated) DEVICE — CURITY NON-ADHERENT STRIPS: Brand: CURITY

## (undated) DEVICE — PREVENA PEEL & PLACE SYSTEM KIT- 13 CM: Brand: PREVENA™ PEEL & PLACE™

## (undated) DEVICE — CULTURE TUBE AEROBIC

## (undated) DEVICE — ULTRASOUND GEL STERILE FOIL PK

## (undated) DEVICE — CATH GUIDE RUBICON 18 X 90

## (undated) DEVICE — CURITY PLAIN PACKING STRIP: Brand: CURITY

## (undated) DEVICE — INSTRUMENT POUCH: Brand: CONVERTORS

## (undated) DEVICE — SUT ETHILON 3-0 PS-1 18 IN 1663G

## (undated) DEVICE — 40529 DERMAPROX PAD 11'' X 15'' X 1'': Brand: 40529 DERMAPROX PAD 11'' X 15'' X 1''

## (undated) DEVICE — BLADE SAGITTAL 25.6 X 9.5MM

## (undated) DEVICE — CAST PADDING 4 IN SYNTHETIC NON-STRL

## (undated) DEVICE — WET SKIN PREP TRAY: Brand: MEDLINE INDUSTRIES, INC.

## (undated) DEVICE — HEMOSTATIC MATRIX SURGIFLO 8ML W/THROMBIN

## (undated) DEVICE — REM POLYHESIVE ADULT PATIENT RETURN ELECTRODE: Brand: VALLEYLAB

## (undated) DEVICE — ACE WRAP 4 IN UNSTERILE

## (undated) DEVICE — CATH SOFT-VU 5FR 65CM SOS OMNI-0

## (undated) DEVICE — FLUID MANAGEMENT KIT - IR

## (undated) DEVICE — SUT MONOCRYL 4-0 PS-2 27 IN Y426H

## (undated) DEVICE — LAPAROTOMY SPONGE - RF AND X-RAY DETECTABLE PRE-WASHED: Brand: SITUATE

## (undated) DEVICE — BETHLEHEM UNIVERSAL  MIONR EXT: Brand: CARDINAL HEALTH

## (undated) DEVICE — SUT PROLENE 6-0 BV130 30 IN 8709H

## (undated) DEVICE — SYRINGE 10ML LL

## (undated) DEVICE — ZIMMER® STERILE DISPOSABLE TOURNIQUET CUFF, DUAL PORT, SINGLE BLADDER, 18 IN. (46 CM)

## (undated) DEVICE — GLOVE INDICATOR PI UNDERGLOVE SZ 7.5 BLUE

## (undated) DEVICE — VAC CANISTER 500ML

## (undated) DEVICE — PACK CUSTOM CARDIOVASCULAR

## (undated) DEVICE — GLOVE SRG BIOGEL 7

## (undated) DEVICE — CULTURE TUBE ANAEROBIC

## (undated) DEVICE — BLADE SAGITTAL  9.5 X 25.5MM 0.4/0.6MMTHCK

## (undated) DEVICE — 4-PORT MANIFOLD: Brand: NEPTUNE 2

## (undated) DEVICE — DRESSING MEPILEX AG BORDER POST-OP 4 X 8 IN

## (undated) DEVICE — GLOVE SRG BIOGEL 7.5

## (undated) DEVICE — CATH SUPPORT RUBICON 4FR 0.014IN 150CM STR

## (undated) DEVICE — DECANTER: Brand: UNBRANDED

## (undated) DEVICE — SINGLE PORT MANIFOLD: Brand: NEPTUNE 2

## (undated) DEVICE — CATH BAL STERLING OTW 3 X 220MM X 150CM

## (undated) DEVICE — CATH BAL CHARGER 7 X 60MM X 75CM

## (undated) DEVICE — 3M™ IOBAN™ 2 ANTIMICROBIAL INCISE DRAPE 6648EZ: Brand: IOBAN™ 2

## (undated) DEVICE — GUIDEWIRE WITH ICE™ HYDROPHILIC COATING: Brand: V-18™ CONTROL WIRE™

## (undated) DEVICE — DRAPE EQUIPMENT RF WAND

## (undated) DEVICE — MICROPUNCTURE 501

## (undated) DEVICE — SUT SILK 2-0 30 IN A305H

## (undated) DEVICE — PINNACLE R/O II INTRODUCER SHEATH WITH RADIOPAQUE MARKER: Brand: PINNACLE